# Patient Record
Sex: FEMALE | Race: WHITE | NOT HISPANIC OR LATINO | Employment: UNEMPLOYED | URBAN - METROPOLITAN AREA
[De-identification: names, ages, dates, MRNs, and addresses within clinical notes are randomized per-mention and may not be internally consistent; named-entity substitution may affect disease eponyms.]

---

## 2017-01-04 ENCOUNTER — OFFICE VISIT (OUTPATIENT)
Dept: SURGERY | Facility: CLINIC | Age: 47
End: 2017-01-04
Payer: MEDICAID

## 2017-01-04 VITALS
WEIGHT: 199.31 LBS | RESPIRATION RATE: 18 BRPM | SYSTOLIC BLOOD PRESSURE: 120 MMHG | HEIGHT: 67 IN | TEMPERATURE: 96 F | HEART RATE: 78 BPM | OXYGEN SATURATION: 98 % | BODY MASS INDEX: 31.28 KG/M2 | DIASTOLIC BLOOD PRESSURE: 80 MMHG

## 2017-01-04 DIAGNOSIS — N63.0 BREAST MASS: Primary | ICD-10-CM

## 2017-01-04 DIAGNOSIS — R92.8 ABNORMAL MAMMOGRAM: ICD-10-CM

## 2017-01-04 DIAGNOSIS — R22.31 AXILLARY MASS, RIGHT: ICD-10-CM

## 2017-01-04 PROCEDURE — 99999 PR PBB SHADOW E&M-EST. PATIENT-LVL IV: CPT | Mod: PBBFAC,,, | Performed by: NURSE PRACTITIONER

## 2017-01-04 PROCEDURE — 99214 OFFICE O/P EST MOD 30 MIN: CPT | Mod: PBBFAC,PO | Performed by: NURSE PRACTITIONER

## 2017-01-04 PROCEDURE — 99204 OFFICE O/P NEW MOD 45 MIN: CPT | Mod: S$PBB,,, | Performed by: NURSE PRACTITIONER

## 2017-01-04 NOTE — PROGRESS NOTES
Patient ID: Josey Flores is a 46 y.o. female.    Chief Complaint: Breast Problem    HPI: Patient presents for the evaluation of a palpable right breast mass and abnormal mammogram of the right breast. Mammogram was performed at [facility] in [place] on 12/13/16. Pt brings images and a report with her today for review.  Alessio Deluna accompanies her today.    Review of Systems   Constitutional: Negative.    HENT: Negative.    Eyes: Negative.    Respiratory: Negative.    Cardiovascular: Negative.    Gastrointestinal: Negative.    Endocrine: Negative.    Genitourinary: Negative.    Musculoskeletal:        Right arm mass that started out the size of a marble 7 years ago and is now the size of a golf ball. No pain associated with the mass.    Skin:        History of hidradenitis in axillary regions and under breasts. Pt denies any abscess or inflammation of the areas for several years.    Allergic/Immunologic: Negative.    Neurological: Negative.    Hematological: Negative.    Psychiatric/Behavioral: Negative.    Breast: pt relates notice of a right breast mass during a shower at the end of November- went to see gyn for abnormal bleeding and evaluation of the breast mass beginning of Dec. Pt denies change in the size of the right breast mass over this time. Denies any history of bruising or trauma to the breast. No nipple discharge an no previous breast abnormality or surgeries.     Current Outpatient Prescriptions   Medication Sig Dispense Refill    multivitamin (THERAGRAN) per tablet Take 1 tablet by mouth once daily.      naproxen (NAPROSYN) 500 MG tablet Take 1 tablet (500 mg total) by mouth 2 (two) times daily with meals. 30 tablet 0    albuterol 90 mcg/actuation inhaler Inhale 1-2 puffs into the lungs every 6 (six) hours as needed for Wheezing. 1 Inhaler 0     No current facility-administered medications for this visit.        Review of patient's allergies indicates:  No Known Allergies    History  reviewed. No pertinent past medical history.    Past Surgical History   Procedure Laterality Date    Cholecystectomy      Tonsillectomy      Adenoidectomy      Appendectomy         History reviewed. No pertinent family history.    Social History     Social History    Marital status:      Spouse name: N/A    Number of children: N/A    Years of education: N/A     Occupational History    Not on file.     Social History Main Topics    Smoking status: Current Every Day Smoker     Packs/day: 1.00     Types: Cigarettes    Smokeless tobacco: Never Used      Comment: no smoking after mn prior to surgery    Alcohol use No    Drug use: No    Sexual activity: Yes     Partners: Male     Other Topics Concern    Not on file     Social History Narrative       Vitals:    01/04/17 0808   BP: 120/80   Pulse: 78   Resp: 18   Temp: (!) 95.6 °F (35.3 °C)       Physical Exam   Constitutional: She is oriented to person, place, and time. She appears well-developed and well-nourished.   HENT:   Head: Normocephalic and atraumatic.   Right Ear: External ear normal.   Left Ear: External ear normal.   Eyes: Conjunctivae and EOM are normal. Pupils are equal, round, and reactive to light. Right eye exhibits no discharge. Left eye exhibits no discharge. No scleral icterus.   Neck: Normal range of motion. Neck supple. No thyromegaly present.   Cardiovascular: Normal rate and regular rhythm.    No murmur heard.  Pulmonary/Chest: Effort normal and breath sounds normal. Right breast exhibits mass. Right breast exhibits no inverted nipple, no nipple discharge, no skin change and no tenderness. Left breast exhibits no inverted nipple, no mass, no nipple discharge, no skin change and no tenderness.       Abdominal: Soft. Bowel sounds are normal.   Musculoskeletal: Normal range of motion.        Arms:  Lymphadenopathy:        Head (right side): No submental, no submandibular, no tonsillar, no preauricular, no posterior auricular and no  occipital adenopathy present.        Head (left side): No submental, no submandibular, no tonsillar, no preauricular, no posterior auricular and no occipital adenopathy present.     She has no cervical adenopathy.        Right cervical: No superficial cervical and no posterior cervical adenopathy present.       Left cervical: No superficial cervical and no posterior cervical adenopathy present.     She has axillary adenopathy (No discrete mass noted in right axilla- difficult to palpate due to the extensive scarring and thickening. ).        Right axillary: No pectoral and no lateral adenopathy present.        Left axillary: No pectoral and no lateral adenopathy present.       Right: No supraclavicular adenopathy present.        Left: No supraclavicular adenopathy present.   Neurological: She is alert and oriented to person, place, and time.   Skin: Skin is warm and dry.   Psychiatric: She has a normal mood and affect. Her behavior is normal. Judgment and thought content normal.   Vitals reviewed.    IMAGIN16: [facility]  Diagnostic mammogram:  Left breast did not reveal any abnormality  Right breast palpable mass is associated with a large irregularly marginated and spiculated mass that is 3.5 cm in size and highly suspicious for cancer.   Ultrasound- suspicious findings.   Right axilla revealed several soft tissue opacities largest 2 cm in size.     Menarche at 6 y/o   Misc-3  LMP: 42 y/o  No history of radiation therapy to the neck or chest wall.  No hormone use postmenopausal  First preg at 16 y/o    FH: paternal aunt breast cancer- unsure age of onset.       Assessment & Plan:  1. Palpable right breast mass upper outer quadrant  2. Abnormal mammogram associated with palpable findings right breast- solid mass - also soft tissue densities right axilla  3. Clinical exam reveals palpable right breast mass as described above. Suspicious. Unable to discretely palpate any right axillary mass.  Pt has significant scar tissue from hidradenitis.   4. Recommend ultrasound guided core needle biopsy of the right breast mass and the right axillary imaging findings of a 2 cm mass. Risk of bleeding, bruising, discomfort and the need for further surgical intervention if reveals cancer. Explained the suspicious nature of the clinical and imaging findings. Will schedule pt at [facility] for the 2 biopsies- Explained that if the tests reveal cancer would recommend consult with a surgeon and an oncologist to discuss her options which could be mediport placement and neoadjuvant chemotherapy. Pt and  verbalize understanding. Pt scheduled at [facility] for 1/11/17 and will see Dr. Davies on 1/17/17 for results and Dr. Delong for recommendations. Will call pt with results once received per her request. Mobile is [phone] and home number: [phone]. Pt instructed to take the disc with images to [facility] today so they can review and plan for her visit. Pt given my contact number in case of any questions.

## 2017-01-04 NOTE — LETTER
January 4, 2017      Erika Chan MD  [address] [address]           [facility] General Surgery  [address] [place] [address]  Phone: [phone]  Fax: [phone]          Patient: Josey Flores   MR Number: [id number]   YOB: 1970   Date of Visit: 1/4/2017       Dear Dr. Erika Chan:    Thank you for referring Josey Flores to me for evaluation. Attached you will find relevant portions of my assessment and plan of care.    If you have questions, please do not hesitate to call me. I look forward to following Josey Flores along with you.    Sincerely,    Virginie Garrison NP    Enclosure  CC:  No Recipients    If you would like to receive this communication electronically, please contact [email or web address] or [phone] to request more information on [employer] Link access.    For providers and/or their staff who would like to refer a patient to [facility], please contact us through our one-stop-shop provider referral line, Kirsten Ledesma, at [phone].    If you feel you have received this communication in error or would no longer like to receive these types of communications, please e-mail [email or web address]

## 2017-01-04 NOTE — MR AVS SNAPSHOT
[facility] Surgery  [address] [place] [address]  Phone: [phone]  Fax: [phone]                  Josey Flores   2017 8:00 AM   Office Visit    Description:  Female : 1970   Provider:  Virginie Garrison NP   Department:  [facility] Surgery           Reason for Visit     Breast Problem           Diagnoses this Visit        Comments    Breast mass    -  Primary     Abnormal mammogram         Axillary mass, right                To Do List           Goals (5 Years of Data)     None      [facility]     [facility] Nurse Care Line -  Assistance  Registered nurses in the [facility] On Call Center provide clinical advisement, health education, appointment booking, and other advisory services.  Call for this free service at [phone].             Medications           Message regarding Medications     Verify the changes and/or additions to your medication regime listed below are the same as discussed with your clinician today.  If any of these changes or additions are incorrect, please notify your healthcare provider.             Verify that the below list of medications is an accurate representation of the medications you are currently taking.  If none reported, the list may be blank. If incorrect, please contact your healthcare provider. Carry this list with you in case of emergency.           Current Medications     multivitamin (THERAGRAN) per tablet Take 1 tablet by mouth once daily.    naproxen (NAPROSYN) 500 MG tablet Take 1 tablet (500 mg total) by mouth 2 (two) times daily with meals.    albuterol 90 mcg/actuation inhaler Inhale 1-2 puffs into the lungs every 6 (six) hours as needed for Wheezing.           Clinical Reference Information           Vital Signs - Last Recorded  Most recent update: 2017  8:09 AM by Tan Guerrero III, LPN    BP Pulse Temp Resp Ht Wt    120/80 (BP Location: Right arm, Patient Position: Sitting, BP Method: Manual)  "78 (!) 95.6 °F (35.3 °C) (Oral) 18 5' 7" (1.702 m) 90.4 kg (199 lb 4.7 oz)    SpO2 BMI             98% 31.21 kg/m2         Blood Pressure          Most Recent Value    BP  120/80      Allergies as of 1/4/2017     No Known Allergies      Immunizations Administered on Date of Encounter - 1/4/2017     None      Orders Placed During Today's Visit      Normal Orders This Visit    US Breast Biopsy with Imaging 1st site R     US Breast Biopsy with Imaging 1st site R     Future Labs/Procedures Expected by Expires    US Breast Biopsy with Imaging 1st site R  1/4/2017 3/6/2018    US Breast Biopsy with Imaging 1st site R  1/4/2017 3/6/2018      [facility] Sign-Up     Activating your [facility] account is as easy as 1-2-3!     1) Visit [email or web address], select Sign Up Now, enter this activation code and your date of birth, then select Next.  [email or web address]  Expires: 2/12/2017  1:51 PM      2) Create a username and password to use when you visit [facility] in the future and select a security question in case you lose your password and select Next.    3) Enter your e-mail address and click Sign Up!    Additional Information  If you have questions, please e-mail [email or web address] or call [phone] to talk to our [facility] staff. Remember, [facility] is NOT to be used for urgent needs. For medical emergencies, dial 911.         Instructions    No anti-inflammatory medications 7 days prior to biopsy: Aleve, Aspirin, Ibuprofen and prescription anti-inflammatory medications.    May eat breakfast and take medications on day of procedure.    After procedure use ice pack over site intermittently and a good support bra for 24 hours.    No vigorous activity for 48 hours after biopsy that would cause extreme breast movement.     Return to clinic in one week for pathology results.         Smoking Cessation     If you would like to quit smoking:   You may be eligible for free services if you are a [place] resident and started smoking " cigarettes before September 1, 1988.  Call the Smoking Cessation Trust (SCT) toll free at (925) 431-5344 or (362) 936-8807.   Call 7-283-QUIT-NOW if you do not meet the above criteria.

## 2017-01-04 NOTE — PATIENT INSTRUCTIONS
No anti-inflammatory medications 7 days prior to biopsy: Aleve, Aspirin, Ibuprofen and prescription anti-inflammatory medications.    May eat breakfast and take medications on day of procedure.    After procedure use ice pack over site intermittently and a good support bra for 24 hours.    No vigorous activity for 48 hours after biopsy that would cause extreme breast movement.     Return to clinic in one week for pathology results.

## 2017-01-17 ENCOUNTER — HOSPITAL ENCOUNTER (OUTPATIENT)
Dept: RADIOLOGY | Facility: HOSPITAL | Age: 47
Discharge: HOME | End: 2017-01-17
Attending: INTERNAL MEDICINE
Payer: MEDICAID

## 2017-01-17 ENCOUNTER — OFFICE VISIT (OUTPATIENT)
Dept: SURGERY | Facility: CLINIC | Age: 47
End: 2017-01-17
Payer: MEDICAID

## 2017-01-17 ENCOUNTER — INITIAL CONSULT (OUTPATIENT)
Dept: HEMATOLOGY/ONCOLOGY | Facility: CLINIC | Age: 47
End: 2017-01-17
Payer: MEDICAID

## 2017-01-17 VITALS
HEART RATE: 79 BPM | SYSTOLIC BLOOD PRESSURE: 112 MMHG | OXYGEN SATURATION: 98 % | DIASTOLIC BLOOD PRESSURE: 70 MMHG | TEMPERATURE: 98 F | WEIGHT: 199.5 LBS | BODY MASS INDEX: 31.31 KG/M2 | HEIGHT: 67 IN

## 2017-01-17 VITALS
SYSTOLIC BLOOD PRESSURE: 124 MMHG | DIASTOLIC BLOOD PRESSURE: 73 MMHG | HEART RATE: 83 BPM | BODY MASS INDEX: 31.21 KG/M2 | TEMPERATURE: 98 F | WEIGHT: 199.31 LBS

## 2017-01-17 DIAGNOSIS — R22.31 MASS OF RIGHT ELBOW: ICD-10-CM

## 2017-01-17 DIAGNOSIS — C77.3 MALIGNANT NEOPLASM METASTATIC TO LYMPH NODE OF AXILLA: ICD-10-CM

## 2017-01-17 DIAGNOSIS — C50.811 MALIGNANT NEOPLASM OF OVERLAPPING SITES OF RIGHT FEMALE BREAST: ICD-10-CM

## 2017-01-17 DIAGNOSIS — C50.811 MALIGNANT NEOPLASM OF OVERLAPPING SITES OF RIGHT FEMALE BREAST: Primary | ICD-10-CM

## 2017-01-17 DIAGNOSIS — C77.3 BREAST CANCER METASTASIZED TO AXILLARY LYMPH NODE, RIGHT: Primary | ICD-10-CM

## 2017-01-17 DIAGNOSIS — C50.911 BREAST CANCER METASTASIZED TO AXILLARY LYMPH NODE, RIGHT: Primary | ICD-10-CM

## 2017-01-17 PROCEDURE — 73202 CT UPPR EXTREMITY W/O&W/DYE: CPT | Mod: 26,RT,, | Performed by: RADIOLOGY

## 2017-01-17 PROCEDURE — 74178 CT ABD&PLV WO CNTR FLWD CNTR: CPT | Mod: TC,PO

## 2017-01-17 PROCEDURE — 99205 OFFICE O/P NEW HI 60 MIN: CPT | Mod: S$PBB,,, | Performed by: INTERNAL MEDICINE

## 2017-01-17 PROCEDURE — 71260 CT THORAX DX C+: CPT | Mod: 26,,, | Performed by: RADIOLOGY

## 2017-01-17 PROCEDURE — 74178 CT ABD&PLV WO CNTR FLWD CNTR: CPT | Mod: 26,,, | Performed by: RADIOLOGY

## 2017-01-17 PROCEDURE — 99999 PR PBB SHADOW E&M-EST. PATIENT-LVL III: CPT | Mod: PBBFAC,,, | Performed by: SURGERY

## 2017-01-17 PROCEDURE — 73202 CT UPPR EXTREMITY W/O&W/DYE: CPT | Mod: TC,PO,RT

## 2017-01-17 PROCEDURE — 25500020 PHARM REV CODE 255: Mod: PO | Performed by: INTERNAL MEDICINE

## 2017-01-17 PROCEDURE — 71260 CT THORAX DX C+: CPT | Mod: TC,PO

## 2017-01-17 PROCEDURE — 99214 OFFICE O/P EST MOD 30 MIN: CPT | Mod: S$PBB,,, | Performed by: SURGERY

## 2017-01-17 PROCEDURE — 99999 PR PBB SHADOW E&M-EST. PATIENT-LVL III: CPT | Mod: PBBFAC,,, | Performed by: INTERNAL MEDICINE

## 2017-01-17 RX ORDER — ONDANSETRON 2 MG/ML
4 INJECTION INTRAMUSCULAR; INTRAVENOUS EVERY 12 HOURS PRN
Status: CANCELLED | OUTPATIENT
Start: 2017-01-17

## 2017-01-17 RX ORDER — SODIUM CHLORIDE 9 MG/ML
INJECTION, SOLUTION INTRAVENOUS CONTINUOUS
Status: CANCELLED | OUTPATIENT
Start: 2017-01-17

## 2017-01-17 RX ADMIN — IOHEXOL 30 ML: 350 INJECTION, SOLUTION INTRAVENOUS at 12:01

## 2017-01-17 RX ADMIN — IOHEXOL 100 ML: 350 INJECTION, SOLUTION INTRAVENOUS at 02:01

## 2017-01-17 NOTE — LETTER
January 17, 2017      Virginie Garrison NP  [address]  [place] [address]           [facility] General Surgery  [address]  [place] [address]  Phone: [phone]  Fax: [phone]          Patient: Josey Flores   MR Number: [id number]   YOB: 1970   Date of Visit: 1/17/2017       Dear Virginie Garrison:    Thank you for referring Josey Flores to me for evaluation. Attached you will find relevant portions of my assessment and plan of care.    If you have questions, please do not hesitate to call me. I look forward to following Josey Flores along with you.    Sincerely,    Messi Davies MD    Enclosure  CC:  No Recipients    If you would like to receive this communication electronically, please contact [email or web address] or [phone] to request more information on [employer] Link access.    For providers and/or their staff who would like to refer a patient to [facility], please contact us through our one-stop-shop provider referral line, [facility] , at [phone].    If you feel you have received this communication in error or would no longer like to receive these types of communications, please e-mail [email or web address]

## 2017-01-17 NOTE — LETTER
January 19, 2017      Virginie Garrison NP  [address] [place] [address]           [facility] - Hemotology Oncology  [address]  [place] [address]  Phone: [phone]  Fax: [phone]          Patient: Josey Flores   MR Number: [id number]   YOB: 1970   Date of Visit: 1/17/2017       Dear Virginie Garrison:    Thank you for referring Josey Flores to me for evaluation. Attached you will find relevant portions of my assessment and plan of care.    If you have questions, please do not hesitate to call me. I look forward to following Josey Flores along with you.    Sincerely,    Richard Delong MD    Enclosure  CC:  No Recipients    If you would like to receive this communication electronically, please contact [email or web address] or [phone] to request more information on [employer] Link access.    For providers and/or their staff who would like to refer a patient to [facility], please contact us through our one-stop-shop provider referral line, [facility] , at [phone].    If you feel you have received this communication in error or would no longer like to receive these types of communications, please e-mail [email or web address]

## 2017-01-17 NOTE — H&P
History & Physical    SUBJECTIVE:     History of Present Illness:  Patient is a 46 y.o. female presents with known diagnosis of invasive ductal cancer of the right breast.  She was initially seen several weeks ago following abnormal mammographic findings which led to biopsy of 2 separate lesions on the right breast as well as the right axillary lymphadenopathy.  The biopsies came back consistent with invasive mammary cancer from all 3 biopsies.  The cancer is significant for ER/ND positive and HER-2/kashif negative. She has no family history of breast cancer history.  She is a heavy smoker and has had recent chest pain which led to the emergency room visits.  She denies of any abnormal findings of her right breast but has felt a palpable mass on patient's right elbow area.  The mass is approximately 3 cm in size which is nontender to palpation.  She noticed that the lesion has been growing quite rapidly over last 2-3 months.    Chief Complaint   Patient presents with    Follow-up     review core       Review of patient's allergies indicates:  No Known Allergies    Current Outpatient Prescriptions   Medication Sig Dispense Refill    albuterol 90 mcg/actuation inhaler Inhale 1-2 puffs into the lungs every 6 (six) hours as needed for Wheezing. 1 Inhaler 0    multivitamin (THERAGRAN) per tablet Take 1 tablet by mouth once daily.      naproxen (NAPROSYN) 500 MG tablet Take 1 tablet (500 mg total) by mouth 2 (two) times daily with meals. 30 tablet 0     No current facility-administered medications for this visit.        History reviewed. No pertinent past medical history.  Past Surgical History   Procedure Laterality Date    Cholecystectomy      Tonsillectomy      Adenoidectomy      Appendectomy       History reviewed. No pertinent family history.  Social History   Substance Use Topics    Smoking status: Current Every Day Smoker     Packs/day: 1.00     Types: Cigarettes    Smokeless tobacco: Never Used       Comment: no smoking after mn prior to surgery    Alcohol use No        Review of Systems:  Review of Systems   Constitutional: Negative for activity change, appetite change, chills, diaphoresis, fever and unexpected weight change.   HENT: Negative.    Respiratory: Positive for cough, shortness of breath and wheezing.    Cardiovascular: Negative.    Gastrointestinal: Negative.    Genitourinary: Negative.    Musculoskeletal: Negative.    Neurological: Negative.    Hematological: Negative.    Psychiatric/Behavioral: Negative.        OBJECTIVE:     Vital Signs (Most Recent)  Temp: 97.5 °F (36.4 °C) (01/17/17 0949)  Pulse: 83 (01/17/17 0949)  BP: 124/73 (01/17/17 0949)     90.4 kg (199 lb 4.7 oz)     Physical Exam:  Physical Exam   Constitutional: She appears well-developed and well-nourished. No distress.   HENT:   Head: Normocephalic.   Eyes: Pupils are equal, round, and reactive to light.   Cardiovascular: Normal rate, regular rhythm, normal heart sounds and intact distal pulses.  Exam reveals no gallop and no friction rub.    No murmur heard.  Pulmonary/Chest: Effort normal and breath sounds normal. She has no wheezes. She exhibits no mass and no tenderness. Right breast exhibits mass, skin change and tenderness. Right breast exhibits no inverted nipple and no nipple discharge. Left breast exhibits no inverted nipple, no mass, no nipple discharge, no skin change and no tenderness. There is breast swelling.       Abdominal: Soft. Bowel sounds are normal.   Genitourinary: There is breast tenderness. No breast discharge.   Musculoskeletal: Normal range of motion. She exhibits no edema, tenderness or deformity.   Right elbow mass, medial, measured approximately 3 cm in diameter which is nontender to palpation and mobile.  The finding is consistent with enlarged lymphadenopathy.       Laboratory  Lab Results   Component Value Date    WBC 8.95 12/09/2016    HGB 14.0 12/09/2016    HCT 41.6 12/09/2016     12/09/2016     ALT 14 11/03/2016    AST 17 11/03/2016     12/09/2016    K 4.3 12/09/2016     12/09/2016    CREATININE 1.0 12/09/2016    BUN 9 12/09/2016    CO2 25 12/09/2016    INR 1.0 11/03/2016       No results found for this or any previous visit.      Diagnostic Results:  Labs: Reviewed  ECG: Reviewed  X-Ray: Reviewed  US: Reviewed  Mammographic studies: Reviewed    None    ASSESSMENT/PLAN:     Invasive mammary cancer of the right breast: 2 separate lesions on the right breast as well as right axillary lymphadenopathies.    PLAN:Plan     The recommendation is proceed with modified radical mastectomy of the right breast.  Also the palpable mass on patient's right elbow area should be excised for biopsy purposes as well.  The risk and the benefit of the surgical intervention have been discussed.  The patient is agreeable with the plan.  The possibility of immediate reconstruction was also discussed but not recommended.  She has fairly advanced stage of the breast cancer which might likely need radiation treatment of the right axilla.  She is also suspected to see Dr. Delong.     Messi Davies

## 2017-01-17 NOTE — MR AVS SNAPSHOT
[facility]  [address] [facility] [address]  Phone: [phone]  Fax: [phone]                  Josey Flores   2017 9:20 AM   Office Visit    Description:  Female : 1970   Provider:  Messi Davies MD   Department:  [facility]           Reason for Visit     Follow-up           Diagnoses this Visit        Comments    Breast cancer metastasized to axillary lymph node, right    -  Primary            To Do List           Future Appointments        Provider Department Dept Phone    2017 1:15 PM LAB, SAME DAY [facility] [phone]    2/3/2017 9:00 AM Messi Davies MD [facility] [phone]      Your Future Surgeries/Procedures     2017   Surgery with Messi Davies MD   [facility]  ([facility])    [address] [facility] [address] [phone]              Goals (5 Years of Data)     None      [facility]     [facility] Nurse Care Line -  Assistance  Registered nurses in the [facility] provide clinical advisement, health education, appointment booking, and other advisory services.  Call for this free service at [phone].             Medications           Message regarding Medications     Verify the changes and/or additions to your medication regime listed below are the same as discussed with your clinician today.  If any of these changes or additions are incorrect, please notify your healthcare provider.             Verify that the below list of medications is an accurate representation of the medications you are currently taking.  If none reported, the list may be blank. If incorrect, please contact your healthcare provider. Carry this list with you in case of emergency.           Current Medications     albuterol 90 mcg/actuation inhaler Inhale 1-2 puffs into the lungs every 6 (six) hours as needed for Wheezing.    multivitamin  (THERAGRAN) per tablet Take 1 tablet by mouth once daily.    naproxen (NAPROSYN) 500 MG tablet Take 1 tablet (500 mg total) by mouth 2 (two) times daily with meals.           Clinical Reference Information           Vital Signs - Last Recorded  Most recent update: 1/17/2017  9:52 AM by Pam Arora MA    BP Pulse Temp Wt BMI    124/73 83 97.5 °F (36.4 °C) (Oral) 90.4 kg (199 lb 4.7 oz) 31.21 kg/m2      Blood Pressure          Most Recent Value    BP  124/73      Allergies as of 1/17/2017     No Known Allergies      Immunizations Administered on Date of Encounter - 1/17/2017     None      Orders Placed During Today's Visit      Normal Orders This Visit    Case Request Operating Room: MASTECTOMY, SENTINEL NODE BX, ANCILLARY NODE DISSECTION     Medium Risk of VTE     Future Labs/Procedures Expected by Expires    CBC auto differential  1/17/2017 3/18/2018    Comprehensive metabolic panel  1/17/2017 3/18/2018      [facility] Sign-Up     Activating your [facility] account is as easy as 1-2-3!     1) Visit [email or web address], select Sign Up Now, enter this activation code and your date of birth, then select Next.  [email or web address]  Expires: 2/12/2017  1:51 PM      2) Create a username and password to use when you visit [facility] in the future and select a security question in case you lose your password and select Next.    3) Enter your e-mail address and click Sign Up!    Additional Information  If you have questions, please e-mail [email or web address] or call [phone] to talk to our [facility] staff. Remember, [facility] is NOT to be used for urgent needs. For medical emergencies, dial 911.         Smoking Cessation     If you would like to quit smoking:   You may be eligible for free services if you are a [place] resident and started smoking cigarettes before September 1, 1988.  Call the Smoking Cessation [employer] (SCT) toll free at (578) 351-2127 or (516) 221-7851.   Call 5-800-QUIT-NOW if you do not meet the above  criteria.

## 2017-01-19 ENCOUNTER — OFFICE VISIT (OUTPATIENT)
Dept: HEMATOLOGY/ONCOLOGY | Facility: CLINIC | Age: 47
End: 2017-01-19
Payer: MEDICAID

## 2017-01-19 VITALS
SYSTOLIC BLOOD PRESSURE: 130 MMHG | HEART RATE: 90 BPM | TEMPERATURE: 97 F | BODY MASS INDEX: 31.08 KG/M2 | DIASTOLIC BLOOD PRESSURE: 78 MMHG | HEIGHT: 67 IN | WEIGHT: 198 LBS | OXYGEN SATURATION: 98 %

## 2017-01-19 DIAGNOSIS — C50.811 MALIGNANT NEOPLASM OF OVERLAPPING SITES OF RIGHT FEMALE BREAST: ICD-10-CM

## 2017-01-19 DIAGNOSIS — C77.3 MALIGNANT NEOPLASM METASTATIC TO LYMPH NODE OF AXILLA: ICD-10-CM

## 2017-01-19 DIAGNOSIS — F41.9 ANXIETY: ICD-10-CM

## 2017-01-19 PROCEDURE — 99214 OFFICE O/P EST MOD 30 MIN: CPT | Mod: S$PBB,,, | Performed by: INTERNAL MEDICINE

## 2017-01-19 PROCEDURE — 99213 OFFICE O/P EST LOW 20 MIN: CPT | Mod: PBBFAC,PO | Performed by: INTERNAL MEDICINE

## 2017-01-19 PROCEDURE — 99999 PR PBB SHADOW E&M-EST. PATIENT-LVL III: CPT | Mod: PBBFAC,,, | Performed by: INTERNAL MEDICINE

## 2017-01-19 RX ORDER — ALPRAZOLAM 0.5 MG/1
0.5 TABLET ORAL 2 TIMES DAILY PRN
Qty: 60 TABLET | Refills: 0 | Status: SHIPPED | OUTPATIENT
Start: 2017-01-19 | End: 2017-02-27 | Stop reason: SDUPTHER

## 2017-01-19 NOTE — PROGRESS NOTES
Provider requesting consultation: Dr. Messi Daveis with general surgery  Reason for Consult: Right breast carcinoma  Date of Diagnosis: January 12, 2017    HPI:   The patient is a 46-year-old  female who presents to the hematology oncology clinic today in consultation to discuss further evaluation and management recommendations for newly diagnosed right breast carcinoma.  The patient has been referred to me by Dr. Messi Davies with general surgery.  I have reviewed all of the patient's relevant clinical history available in the medical record and have utilized this in my evaluation and management recommendations today.  The patient had 2 separate areas in the right breast that were biopsied area the first was a right breast mass at 11:00 which was 3 cm from the nipple which showed invasive mammary carcinoma.  The invasive carcinoma measured at least 1.2 cm in greatest I mention and appeared high-grade.  This tumor was ER positive/WI positive/HER-2 negative.  The second breast mass was biopsied from the retroperitoneal area lower area and was also positive for invasive mammary carcinoma.  This measured at least 0.4 cm and also appeared high-grade.  The tumor is morphologically very similar to the tumor in the prior specimen.  Receptor studies were not done on this specimen.  The patient also had a palpable right axillary lymph node which was biopsied and showed metastatic mammary carcinoma which measured at least 1.3 cm in greatest dimension.  The patient had self palpated this breast mass.  In addition the patient has a palpable mass in the region of her right elbow which she reports has been present for 9 years but had been slowly growing especially over the past year or 2.  She denies any pain associated with this.  She denies any fevers, chills or night sweats.  She denies any melena, hematochezia, hematemesis, hemoptysis or hematuria.  She denies any bowel or urinary complaints.  She denies any nausea,  vomiting or abdominal pain.    PAST MEDICAL HISTORY:   Denies    SURGICAL HISTORY:   1.  Tonsillectomy and adenoidectomy  2.  Appendectomy  3.  Cholecystectomy  4.  D&C    FAMILY HISTORY: She reports that her maternal aunt had lung cancer in her 70s.  She used to smoke cigarettes.  Her maternal cousin had lung cancer in her 60s.  She used to smoke cigarettes.  She denies any other immediate family members with cancer or bleeding/clotting disorders.    SOCIAL HISTORY: She reports a 45+ pack year smoking history and currently smokes one and a half packs of cigarettes a day.  She drinks wine occasionally.  She denies any history of recreational drug use.  She is engaged and has 2 sons.  She works for the [employer].  She lives in [place], [place].     ALLERGIES: [NKDA]    MEDICATIONS: [Medcard has been reviewed and/or reconciled.]    REVIEW OF SYSTEMS:   GENERAL: [No fevers, chills or sweats. No fatigue, weight loss or loss of appetite.]  HEENT: [No blurred vision, tinnitus, nasal discharge, sorethroat or dysphagia.]  HEART: [No chest pain, palpitations or shortness of breath.]    LUNGS: [No cough, hemoptysis or breathing problems.]  ABDOMEN: [No abdominal pain, nausea, vomiting, diarrhea, constipation or melena.]  GENITOURINARY: [No dysuria, bleeding or malodorous discharge.]  NEURO: [No headache, dizziness or vertigo.]  HEMATOLOGY: [No easy bruising, spontaneous bleeding or blood clots in the past].  MUSCULOSKELETAL: [No arthralgias, myalgias or bone pains.]  SKIN: [No rashes or skin lesions.]  PSYCHIATRY: [No depression or anxiety.]    PHYSICAL EXAMINATION:   VS: Reviewed on nurse's notes.  APPEARANCE: The patient is a well-developed, well-nourished and well-groomed  female who appears in no acute distress but she was accompanied to this clinic visit by her fiancé and sister.  HEENT: No scleral icterus. Both external auditory canals clear. No oral ulcers, lesions. Throat clear  HEAD: No sinus  tenderness.  NECK: Supple. No palpable lymphadenopathy. Thyroid non-tender, no palpable masses  CHEST: Breath sounds clear bilaterally. No rales. No rhonchi. Unlabored respirations.  BREAST: There is a palpable breast mass at the 11:00 position in the right breast.  Ecchymoses from biopsies are noted.  No palpable masses in the left breast.  No evidence of nipple discharge bilaterally.  CARDIOVASCULAR: Normal S1, S2. Normal rate. Regular rhythm.  ABDOMEN: Bowel sounds normal. No tenderness. No abdominal distention. No hepatomegaly. No splenomegaly.  LYMPHATIC: No palpable supraclavicular.  There is a palpable right axillary lymph node.  Small hematoma from recent biopsy also noted.    EXTREMITIES: No clubbing, cyanosis, edema.  Palpable firm 3 cm mass in the right elbow is noted.  SKIN: No lesions. No petechiae. No ecchymoses. No induration or nodules  NEUROLOGIC: No focal findings. Alert & Oriented x 3. Mood appropriate to affect    LABS:   Reviewed    IMAGING:  Reviewed    IMPRESSION:  1.  Multifocal infiltrating ductal carcinoma of the right breast [ER positive/IA positive/HER-2 negative]  2.  Metastatic carcinoma in right axillary lymph node  3.  Tobacco abuse  4.  Right elbow mass    PLAN:  1.  I had a detailed discussion with the patient and her family members today with regard to the diagnosis, prognosis and treatment options for her newly diagnosed multifocal invasive ductal carcinoma of the right breast.  2.  I have recommended proceeding with CT scans of the thorax/abdomen/pelvis for staging of her newly diagnosed malignancy.  I will also include CT of the right elbow for further evaluation of the right elbow mass.  I have discussed that this is an unlikely area for breast cancer to metastasize to.  Differential diagnosis does include benign etiologies such as cyst.  3.  I will follow-up with results of CBC, CMP scheduled for later today.  4.  The patient was strongly encouraged to quit smoking.  She  expressed understanding.  5.  I will plan on obtaining ER/MS/HER-2 status on the retroareolar breast tumor as well.    Return to clinic after above to discuss further management.  She knows to call sooner for any new problems or questions.    I spent greater than one hour face-to-face with the patient and her family members discussing and reviewing all of the above including reviewing outside medical records and discussing with Dr. Davies in detail with greater than 50% of this time spent in counseling.    Richard Delong MD

## 2017-01-19 NOTE — PROGRESS NOTES
Reason for visit: Right breast carcinoma  Date of Diagnosis: January 12, 2017    HPI:   The patient is a 46-year-old  female who presents to the hematology oncology clinic today in consultation to discuss further evaluation and management recommendations for newly diagnosed right breast carcinoma.    I have reviewed all of the patient's relevant clinical history available in the medical record and have utilized this in my evaluation and management recommendations today.  The patient had 2 separate areas in the right breast that were biopsied area the first was a right breast mass at 11:00 which was 3 cm from the nipple which showed invasive mammary carcinoma.  The invasive carcinoma measured at least 1.2 cm in greatest I mention and appeared high-grade.  This tumor was ER positive/SC positive/HER-2 negative.  The second breast mass was biopsied from the retroperitoneal area lower area and was also positive for invasive mammary carcinoma.  This measured at least 0.4 cm and also appeared high-grade.  The tumor is morphologically very similar to the tumor in the prior specimen.  Receptor studies were not done on this specimen.  The patient also had a palpable right axillary lymph node which was biopsied and showed metastatic mammary carcinoma which measured at least 1.3 cm in greatest dimension.  The patient had self palpated this breast mass.  In addition the patient has a palpable mass in the region of her right elbow which she reports has been present for 9 years but had been slowly growing especially over the past year or 2.  She denies any pain associated with this.  She denies any fevers, chills or night sweats.  She denies any melena, hematochezia, hematemesis, hemoptysis or hematuria.  She denies any bowel or urinary complaints.  She denies any nausea, vomiting or abdominal pain.    PAST MEDICAL HISTORY:   Denies    SURGICAL HISTORY:   1.  Tonsillectomy and adenoidectomy  2.  Appendectomy  3.   Cholecystectomy  4.  D&C    FAMILY HISTORY: She reports that her maternal aunt had lung cancer in her 70s.  She used to smoke cigarettes.  Her maternal cousin had lung cancer in her 60s.  She used to smoke cigarettes.  She denies any other immediate family members with cancer or bleeding/clotting disorders.    SOCIAL HISTORY: She reports a 45+ pack year smoking history and currently smokes one and a half packs of cigarettes a day.  She drinks wine occasionally.  She denies any history of recreational drug use.  She is engaged and has 2 sons.  She works for the [employer].  She lives in [place], [place].     ALLERGIES: [NKDA]    MEDICATIONS: [Medcard has been reviewed and/or reconciled.]    REVIEW OF SYSTEMS:   GENERAL: [No fevers, chills or sweats. No fatigue, weight loss or loss of appetite.]  HEENT: [No blurred vision, tinnitus, nasal discharge, sorethroat or dysphagia.]  HEART: [No chest pain, palpitations or shortness of breath.]    LUNGS: [No cough, hemoptysis or breathing problems.]  ABDOMEN: [No abdominal pain, nausea, vomiting, diarrhea, constipation or melena.]  GENITOURINARY: [No dysuria, bleeding or malodorous discharge.]  NEURO: [No headache, dizziness or vertigo.]  HEMATOLOGY: [No easy bruising, spontaneous bleeding or blood clots in the past].  MUSCULOSKELETAL: [No arthralgias, myalgias or bone pains.]  SKIN: [No rashes or skin lesions.]  PSYCHIATRY: [No depression. Reports anxiety.]    PHYSICAL EXAMINATION:   VS: Reviewed on nurse's notes.  APPEARANCE: The patient is a well-developed, well-nourished and well-groomed  female who appears in no acute distress but she was accompanied to this clinic visit by her fiance, sister and niece.  The remainder of the patient's physical exam was deferred today.    LABS:   Reviewed    IMAGING:  Reviewed    IMPRESSION:  1.  Multifocal infiltrating ductal carcinoma of the right breast [ER positive/NE positive/HER-2 negative]  2.  Metastatic carcinoma  in right axillary lymph node  3.  Tobacco abuse  4.  Right elbow mass  5.  Anxiety    PLAN:  1.  I had a detailed discussion with the patient and her family members today with regard to the diagnosis, prognosis and treatment options for her newly diagnosed multifocal invasive ductal carcinoma of the right breast.  2.  Results of CT scans of the thorax/abdomen/pelvis and right elbow were discussed in detail.  No definitive evidence of distant metastatic disease is noted.  I have discussed the patient's case in detail with Dr. Davies as well.  He is concerned about potential infectious complications with neoadjuvant chemotherapy because of the patient's axillary hydradenitis.  Therefore the plan at this time is to proceed with right mastectomy with right axillary lymph node dissection followed by adjuvant chemotherapy and/or radiation therapy if indicated based on pathology results.  Dr. Davies will also resect the patient's right elbow mass for further evaluation and will treat the axillae hydradenitis as well.  3.  The patient was strongly encouraged to quit smoking.  She expressed understanding.  Prescription for Xanax when necessary anxiety was given to the patient after full discussion of sedation precautions.      Return to clinic  2 weeks after completion of surgical resection to discuss further management. She knows to call sooner for any new problems or questions.    I spent greater than 20 min face-to-face with the patient and her family members discussing and reviewing all of the above in detail with greater than 50% of this time spent in counseling.    Richard Delong MD

## 2017-01-20 ENCOUNTER — TELEPHONE (OUTPATIENT)
Dept: HEMATOLOGY/ONCOLOGY | Facility: CLINIC | Age: 47
End: 2017-01-20

## 2017-01-20 DIAGNOSIS — C50.811 MALIGNANT NEOPLASM OF OVERLAPPING SITES OF RIGHT FEMALE BREAST: Primary | ICD-10-CM

## 2017-01-20 NOTE — TELEPHONE ENCOUNTER
----- Message from Saira Cantor sent at 1/19/2017  1:55 PM CST -----  Contact: Joanna/ [facility]   Joanna is requesting to speak with nurse regarding order placed by dr. Mg call Joanna back at [phone]

## 2017-01-23 ENCOUNTER — HOSPITAL ENCOUNTER (OUTPATIENT)
Dept: RADIOLOGY | Facility: HOSPITAL | Age: 47
Discharge: HOME | End: 2017-01-23
Attending: INTERNAL MEDICINE
Payer: MEDICAID

## 2017-01-23 ENCOUNTER — TELEPHONE (OUTPATIENT)
Dept: HEMATOLOGY/ONCOLOGY | Facility: CLINIC | Age: 47
End: 2017-01-23

## 2017-01-23 DIAGNOSIS — C77.3 MALIGNANT NEOPLASM METASTATIC TO LYMPH NODE OF AXILLA: ICD-10-CM

## 2017-01-23 DIAGNOSIS — C50.811 MALIGNANT NEOPLASM OF OVERLAPPING SITES OF RIGHT FEMALE BREAST: ICD-10-CM

## 2017-01-23 PROCEDURE — A9552 F18 FDG: HCPCS | Mod: PO

## 2017-01-23 PROCEDURE — 78815 PET IMAGE W/CT SKULL-THIGH: CPT | Mod: 26,,, | Performed by: RADIOLOGY

## 2017-01-23 NOTE — TELEPHONE ENCOUNTER
----- Message from Richard Delong MD sent at 1/23/2017 12:15 PM CST -----  PET/CT result is abnormal. I need to see her in clinic tomorrow to discuss. Thank you

## 2017-01-23 NOTE — TELEPHONE ENCOUNTER
----- Message from Saira Cantor sent at 1/23/2017 11:32 AM CST -----  Contact: pt  Pt is requesting to speak with nurse regarding questions on appt from 1/23/17. Pls call pt back at [phone]

## 2017-01-24 ENCOUNTER — OFFICE VISIT (OUTPATIENT)
Dept: HEMATOLOGY/ONCOLOGY | Facility: CLINIC | Age: 47
End: 2017-01-24
Payer: MEDICAID

## 2017-01-24 VITALS
HEIGHT: 67 IN | OXYGEN SATURATION: 98 % | DIASTOLIC BLOOD PRESSURE: 80 MMHG | SYSTOLIC BLOOD PRESSURE: 122 MMHG | TEMPERATURE: 98 F | BODY MASS INDEX: 31.35 KG/M2 | WEIGHT: 199.75 LBS | HEART RATE: 87 BPM

## 2017-01-24 DIAGNOSIS — E04.1 THYROID NODULE: ICD-10-CM

## 2017-01-24 DIAGNOSIS — C50.811 MALIGNANT NEOPLASM OF OVERLAPPING SITES OF RIGHT FEMALE BREAST: Primary | ICD-10-CM

## 2017-01-24 DIAGNOSIS — C77.3 MALIGNANT NEOPLASM METASTATIC TO LYMPH NODE OF AXILLA: ICD-10-CM

## 2017-01-24 DIAGNOSIS — C79.51 SECONDARY CANCER OF BONE: ICD-10-CM

## 2017-01-24 PROCEDURE — 99215 OFFICE O/P EST HI 40 MIN: CPT | Mod: S$PBB,,, | Performed by: INTERNAL MEDICINE

## 2017-01-24 PROCEDURE — 99213 OFFICE O/P EST LOW 20 MIN: CPT | Mod: PBBFAC | Performed by: INTERNAL MEDICINE

## 2017-01-24 PROCEDURE — 99999 PR PBB SHADOW E&M-EST. PATIENT-LVL III: CPT | Mod: PBBFAC,,, | Performed by: INTERNAL MEDICINE

## 2017-01-24 NOTE — PROGRESS NOTES
Reason for visit: Right breast carcinoma  Date of Diagnosis: January 12, 2017    HPI:   The patient is a 46-year-old  female who presents to the hematology oncology clinic today to discuss further evaluation and management recommendations for newly diagnosed right breast carcinoma.    I have reviewed all of the patient's relevant clinical history available in the medical record and have utilized this in my evaluation and management recommendations today.  The patient had 2 separate areas in the right breast that were biopsied area the first was a right breast mass at 11:00 which was 3 cm from the nipple which showed invasive mammary carcinoma.  The invasive carcinoma measured at least 1.2 cm in greatest I mention and appeared high-grade.  This tumor was ER positive/DE positive/HER-2 negative.  The second breast mass was biopsied from the retroperitoneal area lower area and was also positive for invasive mammary carcinoma.  This measured at least 0.4 cm and also appeared high-grade.  The tumor is morphologically very similar to the tumor in the prior specimen.  Receptor studies were not done on this specimen.  The patient also had a palpable right axillary lymph node which was biopsied and showed metastatic mammary carcinoma which measured at least 1.3 cm in greatest dimension.  The patient had self palpated this breast mass.  In addition the patient has a palpable mass in the region of her right elbow which she reports has been present for 9 years but had been slowly growing especially over the past year or 2.  She denies any pain associated with this.  She denies any fevers, chills or night sweats.  She denies any melena, hematochezia, hematemesis, hemoptysis or hematuria.  She denies any bowel or urinary complaints.  She denies any nausea, vomiting or abdominal pain.    PAST MEDICAL HISTORY:   Denies    SURGICAL HISTORY:   1.  Tonsillectomy and adenoidectomy  2.  Appendectomy  3.  Cholecystectomy  4.   D&C    FAMILY HISTORY: She reports that her maternal aunt had lung cancer in her 70s.  She used to smoke cigarettes.  Her maternal cousin had lung cancer in her 60s.  She used to smoke cigarettes.  She denies any other immediate family members with cancer or bleeding/clotting disorders.    SOCIAL HISTORY: She reports a 45+ pack year smoking history and currently smokes one and a half packs of cigarettes a day.  She drinks wine occasionally.  She denies any history of recreational drug use.  She is engaged and has 2 sons.  She works for the [employer].  She lives in [place], [place].     ALLERGIES: [NKDA]    MEDICATIONS: [Medcard has been reviewed and/or reconciled.]    REVIEW OF SYSTEMS:   GENERAL: [No fevers, chills or sweats. No fatigue, weight loss or loss of appetite.]  HEENT: [No blurred vision, tinnitus, nasal discharge, sorethroat or dysphagia.]  HEART: [No chest pain, palpitations or shortness of breath.]    LUNGS: [No cough, hemoptysis or breathing problems.]  ABDOMEN: [No abdominal pain, nausea, vomiting, diarrhea, constipation or melena.]  GENITOURINARY: [No dysuria, bleeding or malodorous discharge.]  NEURO: [No headache, dizziness or vertigo.]  HEMATOLOGY: [No easy bruising, spontaneous bleeding or blood clots in the past].  MUSCULOSKELETAL: [No arthralgias, myalgias or bone pains.]  SKIN: [No rashes or skin lesions.]  PSYCHIATRY: [No depression. Reports anxiety.]  She denies any suicidal or homicidal ideation.    PHYSICAL EXAMINATION:   VS: Reviewed on nurse's notes.  APPEARANCE: The patient is a well-developed, well-nourished and well-groomed  female who appears in no acute distress. She was accompanied to this clinic visit by her fiance and sister  HEENT: No scleral icterus. Both external auditory canals clear. No oral ulcers, lesions. Throat clear  HEAD: No sinus tenderness.  NECK: Supple. No palpable lymphadenopathy. Thyroid non-tender, no palpable masses  CHEST: Breath sounds  clear bilaterally. No rales. No rhonchi. Unlabored respirations.  BREAST: Deferred today.  CARDIOVASCULAR: Normal S1, S2. Normal rate. Regular rhythm.  ABDOMEN: Bowel sounds normal. No tenderness. No abdominal distention. No hepatomegaly. No splenomegaly.  LYMPHATIC: No palpable supraclavicular. There is a palpable right axillary lymph node. Small hematoma from recent biopsy also noted.   EXTREMITIES: No clubbing, cyanosis, edema. Palpable firm 3 cm mass in the right elbow is noted.  Bilateral axillary hydradenitis is noted without active infection.  SKIN: No lesions. No petechiae. No ecchymoses. No induration or nodules  NEUROLOGIC: No focal findings. Alert & Oriented x 3. Mood appropriate to affect      LABS:   Reviewed    IMAGING:  Reviewed    IMPRESSION:  1.  Metastatic multifocal infiltrating ductal carcinoma of the right breast with right axillary and bone involvement [ER positive/CA positive/HER-2 negative]  2.  FDG avid right thyroid nodule  3.  Tobacco abuse  4.  Right elbow mass  5.  Anxiety    PLAN:  1.  I had a detailed discussion with the patient and her family members today with regard to the diagnosis, prognosis and treatment options for her newly diagnosed metastatic multifocal invasive ductal carcinoma of the right breast with right axillary and bone involvement.  2.  I have discussed that at this time her metastatic malignancy is potentially treatable but not curable.  3.  Results of PET/CT scan from January 23, 2017 were discussed in detail with the patient and her family members today. There are 3 osseous metastatic lesions one involving the manubrium and 2 involving the posterior left ilium most consistent with metastatic disease. She also has FDG avid right thyroid nodule which is highly suspicious for possible malignancy as well.    4.  The patient was strongly encouraged to quit smoking.  She expressed understanding.  Prescription for Xanax when necessary anxiety was previously given to the  patient after full discussion of sedation precautions.    5.  I will check additional lab work today including FSH and estradiol levels.  I will also check TSH today.  I will also plan to discuss her case with her gynecologist Dr. Chan with regard to patient's reported history of abnormal Pap smear and with regard to her menopausal status.  6.  The patient's surgery with Dr. Davies scheduled for January 26, 2017 will be canceled pending the results of the above workup.  I have discussed this via telephone with Dr. Davies.  7.  The patient will be scheduled for an MRI of the brain to evaluate for any evidence of metastatic disease to complete staging workup.  8.  I have recommended proceeding with genetic testing for her diagnosis of breast cancer at the age of 46.  Risks/benefits were discussed in detail and the patient was understanding.  She would like to get her lab work and genetic testing done on a different day as she is currently overwhelmed with all of this information.  I will schedule this as per her request.    Return to clinic after above to discuss further management.   She knows to call sooner for any new problems or questions.    I spent greater than 40 min face-to-face with the patient and her family members discussing and reviewing all of the above in detail with greater than 50% of this time spent in counseling.    Richard Delong MD

## 2017-01-25 ENCOUNTER — LAB VISIT (OUTPATIENT)
Dept: LAB | Facility: HOSPITAL | Age: 47
End: 2017-01-25
Attending: INTERNAL MEDICINE
Payer: MEDICAID

## 2017-01-25 ENCOUNTER — TELEPHONE (OUTPATIENT)
Dept: HEMATOLOGY/ONCOLOGY | Facility: CLINIC | Age: 47
End: 2017-01-25

## 2017-01-25 DIAGNOSIS — C50.811 MALIGNANT NEOPLASM OF OVERLAPPING SITES OF RIGHT FEMALE BREAST: ICD-10-CM

## 2017-01-25 DIAGNOSIS — F32.A DEPRESSION, UNSPECIFIED DEPRESSION TYPE: Primary | ICD-10-CM

## 2017-01-25 DIAGNOSIS — C79.51 SECONDARY CANCER OF BONE: ICD-10-CM

## 2017-01-25 DIAGNOSIS — C77.3 MALIGNANT NEOPLASM METASTATIC TO LYMPH NODE OF AXILLA: ICD-10-CM

## 2017-01-25 DIAGNOSIS — E04.1 THYROID NODULE: ICD-10-CM

## 2017-01-25 LAB
ESTRADIOL SERPL-MCNC: 14 PG/ML
FSH SERPL-ACNC: 98.6 MIU/ML
TSH SERPL DL<=0.005 MIU/L-ACNC: 0.6 UIU/ML

## 2017-01-25 PROCEDURE — 82670 ASSAY OF TOTAL ESTRADIOL: CPT

## 2017-01-25 PROCEDURE — 84443 ASSAY THYROID STIM HORMONE: CPT

## 2017-01-25 PROCEDURE — 36415 COLL VENOUS BLD VENIPUNCTURE: CPT

## 2017-01-25 PROCEDURE — 83001 ASSAY OF GONADOTROPIN (FSH): CPT

## 2017-01-25 RX ORDER — CITALOPRAM 20 MG/1
20 TABLET, FILM COATED ORAL DAILY
Qty: 30 TABLET | Refills: 2 | Status: SHIPPED | OUTPATIENT
Start: 2017-01-25 | End: 2017-04-19 | Stop reason: SDUPTHER

## 2017-01-25 NOTE — TELEPHONE ENCOUNTER
----- Message from Richard Delong MD sent at 1/25/2017  2:51 PM CST -----  Contact: Alessio Deluna (So)  I have sent a prescription for Celexa to her pharmacy to try to help with her depression.  It is not safe for her to take that many Xanax.  The medicine needs to be used very carefully.  Please have one of the social workers call her today to check on her.  She also needs consultation with our clinical psychologist and either Huma or Elsa can set this up for her.  Also she needs to seek immediate medical attention if she has any suicidal ideation.  Please save this note.  Thank you.  ----- Message -----     From: Juliet Gale MA     Sent: 1/25/2017   2:19 PM       To: Richard Delong MD    Pt  calling wanting something called in for pt depression states she's not doing well with the news on yesterday, she's taking 3 xanax at a time just to sleep, and not sleeping or eating as well  ----- Message -----     From: Slade Machado     Sent: 1/25/2017  11:50 AM       To: Baljeet Ramos Staff    Alessio Deluna (Nataliya) is requesting a call from nurse to discuss some health concerns.          Please call Alessio Redd) back at [phone]

## 2017-01-30 ENCOUNTER — TELEPHONE (OUTPATIENT)
Dept: RADIOLOGY | Facility: HOSPITAL | Age: 47
End: 2017-01-30

## 2017-01-31 ENCOUNTER — HOSPITAL ENCOUNTER (OUTPATIENT)
Dept: RADIOLOGY | Facility: HOSPITAL | Age: 47
Discharge: HOME | End: 2017-01-31
Attending: INTERNAL MEDICINE
Payer: MEDICAID

## 2017-01-31 DIAGNOSIS — C50.811 MALIGNANT NEOPLASM OF OVERLAPPING SITES OF RIGHT FEMALE BREAST: ICD-10-CM

## 2017-01-31 DIAGNOSIS — C79.51 SECONDARY CANCER OF BONE: ICD-10-CM

## 2017-01-31 DIAGNOSIS — C77.3 MALIGNANT NEOPLASM METASTATIC TO LYMPH NODE OF AXILLA: ICD-10-CM

## 2017-01-31 DIAGNOSIS — E04.1 THYROID NODULE: ICD-10-CM

## 2017-01-31 PROCEDURE — 70553 MRI BRAIN STEM W/O & W/DYE: CPT | Mod: TC,PO

## 2017-01-31 PROCEDURE — 70553 MRI BRAIN STEM W/O & W/DYE: CPT | Mod: 26,,, | Performed by: RADIOLOGY

## 2017-01-31 PROCEDURE — A9585 GADOBUTROL INJECTION: HCPCS | Mod: PO | Performed by: INTERNAL MEDICINE

## 2017-01-31 PROCEDURE — 25500020 PHARM REV CODE 255: Mod: PO | Performed by: INTERNAL MEDICINE

## 2017-01-31 RX ORDER — GADOBUTROL 604.72 MG/ML
10 INJECTION INTRAVENOUS
Status: COMPLETED | OUTPATIENT
Start: 2017-01-31 | End: 2017-01-31

## 2017-01-31 RX ADMIN — GADOBUTROL 10 ML: 604.72 INJECTION INTRAVENOUS at 04:01

## 2017-02-01 ENCOUNTER — TELEPHONE (OUTPATIENT)
Dept: OBSTETRICS AND GYNECOLOGY | Facility: CLINIC | Age: 47
End: 2017-02-01

## 2017-02-01 ENCOUNTER — OFFICE VISIT (OUTPATIENT)
Dept: HEMATOLOGY/ONCOLOGY | Facility: CLINIC | Age: 47
End: 2017-02-01
Payer: MEDICAID

## 2017-02-01 ENCOUNTER — TELEPHONE (OUTPATIENT)
Dept: PHARMACY | Facility: CLINIC | Age: 47
End: 2017-02-01

## 2017-02-01 VITALS
DIASTOLIC BLOOD PRESSURE: 78 MMHG | OXYGEN SATURATION: 99 % | WEIGHT: 199.94 LBS | BODY MASS INDEX: 31.38 KG/M2 | HEART RATE: 76 BPM | SYSTOLIC BLOOD PRESSURE: 102 MMHG | HEIGHT: 67 IN

## 2017-02-01 DIAGNOSIS — C79.51 SECONDARY CANCER OF BONE: ICD-10-CM

## 2017-02-01 DIAGNOSIS — G89.3 NEOPLASM RELATED PAIN: ICD-10-CM

## 2017-02-01 DIAGNOSIS — C50.811 MALIGNANT NEOPLASM OF OVERLAPPING SITES OF RIGHT FEMALE BREAST: Primary | ICD-10-CM

## 2017-02-01 DIAGNOSIS — C77.3 MALIGNANT NEOPLASM METASTATIC TO LYMPH NODE OF AXILLA: ICD-10-CM

## 2017-02-01 PROCEDURE — 99215 OFFICE O/P EST HI 40 MIN: CPT | Mod: S$PBB,,, | Performed by: INTERNAL MEDICINE

## 2017-02-01 PROCEDURE — 99213 OFFICE O/P EST LOW 20 MIN: CPT | Mod: PBBFAC,PO | Performed by: INTERNAL MEDICINE

## 2017-02-01 PROCEDURE — 99999 PR PBB SHADOW E&M-EST. PATIENT-LVL III: CPT | Mod: PBBFAC,,, | Performed by: INTERNAL MEDICINE

## 2017-02-01 RX ORDER — HYDROCODONE BITARTRATE AND ACETAMINOPHEN 5; 325 MG/1; MG/1
1 TABLET ORAL EVERY 6 HOURS PRN
Qty: 60 TABLET | Refills: 0 | Status: SHIPPED | OUTPATIENT
Start: 2017-02-01 | End: 2017-02-17

## 2017-02-01 RX ORDER — LETROZOLE 2.5 MG/1
2.5 TABLET, FILM COATED ORAL DAILY
Qty: 30 TABLET | Refills: 2 | Status: SHIPPED | OUTPATIENT
Start: 2017-02-01 | End: 2017-04-19 | Stop reason: SDUPTHER

## 2017-02-05 NOTE — PROGRESS NOTES
Reason for visit: Right breast carcinoma  Date of Diagnosis: January 12, 2017    HPI:   The patient is a 46-year-old  female who presents to the hematology oncology clinic today to discuss further evaluation and management recommendations for newly diagnosed metastatic right breast carcinoma.    I have reviewed all of the patient's relevant clinical history available in the medical record and have utilized this in my evaluation and management recommendations today.  The patient had 2 separate areas in the right breast that were biopsied area the first was a right breast mass at 11:00 which was 3 cm from the nipple which showed invasive mammary carcinoma.  The invasive carcinoma measured at least 1.2 cm in greatest I mention and appeared high-grade.  This tumor was ER positive/RI positive/HER-2 negative.  The second breast mass was biopsied from the retroperitoneal area lower area and was also positive for invasive mammary carcinoma.  This measured at least 0.4 cm and also appeared high-grade.  The tumor is morphologically very similar to the tumor in the prior specimen.  Receptor studies were not done on this specimen.  The patient also had a palpable right axillary lymph node which was biopsied and showed metastatic mammary carcinoma which measured at least 1.3 cm in greatest dimension.  The patient had self palpated this breast mass.  In addition the patient has a palpable mass in the region of her right elbow which she reports has been present for 9 years but had been slowly growing especially over the past year or 2.  She denies any pain associated with this.  She denies any fevers, chills or night sweats.  She denies any melena, hematochezia, hematemesis, hemoptysis or hematuria.  She denies any bowel or urinary complaints.  She denies any nausea, vomiting or abdominal pain.    PAST MEDICAL HISTORY:   Denies    SURGICAL HISTORY:   1.  Tonsillectomy and adenoidectomy  2.  Appendectomy  3.   Cholecystectomy  4.  D&C    FAMILY HISTORY: She reports that her maternal aunt had lung cancer in her 70s.  She used to smoke cigarettes.  Her maternal cousin had lung cancer in her 60s.  She used to smoke cigarettes.  She denies any other immediate family members with cancer or bleeding/clotting disorders.    SOCIAL HISTORY: She reports a 45+ pack year smoking history and currently smokes one and a half packs of cigarettes a day.  She drinks wine occasionally.  She denies any history of recreational drug use.  She is engaged and has 2 sons.  She works for the [employer].  She lives in [place], [place].     ALLERGIES: [NKDA]    MEDICATIONS: [Medcard has been reviewed and/or reconciled.]    REVIEW OF SYSTEMS:   GENERAL: [No fevers, chills or sweats. No fatigue, weight loss or loss of appetite.]  HEENT: [No blurred vision, tinnitus, nasal discharge, sorethroat or dysphagia.]  HEART: [No chest pain, palpitations or shortness of breath.]    LUNGS: [No cough, hemoptysis or breathing problems.]  ABDOMEN: [No abdominal pain, nausea, vomiting, diarrhea, constipation or melena.]  GENITOURINARY: [No dysuria, bleeding or malodorous discharge.]  NEURO: [No headache, dizziness or vertigo.]  HEMATOLOGY: [No easy bruising, spontaneous bleeding or blood clots in the past].  MUSCULOSKELETAL: [No arthralgias, myalgias or bone pains.]  SKIN: [No rashes or skin lesions.]  PSYCHIATRY: [No depression. Reports anxiety.]  She denies any suicidal or homicidal ideation.    PHYSICAL EXAMINATION:   VS: Reviewed on nurse's notes.  APPEARANCE: The patient is a well-developed, well-nourished and well-groomed  female who appears in no acute distress. She was accompanied to this clinic visit by her fiance and sister  HEENT: No scleral icterus. Both external auditory canals clear. No oral ulcers, lesions. Throat clear  HEAD: No sinus tenderness.  NECK: Supple. No palpable lymphadenopathy. Thyroid non-tender, no palpable  masses  CHEST: Breath sounds clear bilaterally. No rales. No rhonchi. Unlabored respirations.  BREAST: Deferred today.  CARDIOVASCULAR: Normal S1, S2. Normal rate. Regular rhythm.  ABDOMEN: Bowel sounds normal. No tenderness. No abdominal distention. No hepatomegaly. No splenomegaly.  LYMPHATIC: No palpable supraclavicular. There is a palpable right axillary lymph node. Small hematoma from recent biopsy also noted.   EXTREMITIES: No clubbing, cyanosis, edema. Palpable firm 3 cm mass in the right elbow is noted.  Bilateral axillary hydradenitis is noted without active infection.  SKIN: No lesions. No petechiae. No ecchymoses. No induration or nodules  NEUROLOGIC: No focal findings. Alert & Oriented x 3. Mood appropriate to affect      LABS:   Reviewed    IMAGING:  Reviewed    IMPRESSION:  1.  Metastatic multifocal infiltrating ductal carcinoma of the right breast with right axillary and bone involvement [ER positive/CT positive/HER-2 negative]  2.  FDG avid right thyroid nodule  3.  Tobacco abuse  4.  Right elbow mass  5.  Anxiety    PLAN:  1.  I had a detailed discussion with the patient and her family members today with regard to the diagnosis, prognosis and treatment options for her newly diagnosed metastatic multifocal invasive ductal carcinoma of the right breast with right axillary and bone involvement.  2.  I have discussed that at this time her metastatic malignancy is potentially treatable but not curable.  3.  Results of PET/CT scan from January 23, 2017 were discussed in detail with the patient and her family members today. There are 3 osseous metastatic lesions one involving the manubrium and 2 involving the posterior left ilium most consistent with metastatic disease. She also has FDG avid right thyroid nodule which is highly suspicious for possible malignancy as well.    4.  The patient was strongly encouraged to quit smoking.  She expressed understanding.  Prescription for Xanax when necessary anxiety  was previously given to the patient after full discussion of sedation precautions.    5. FSH and estradiol levels confirm menopausal status. TSH lab was normal. I will also plan to discuss her case with her gynecologist Dr. Chan with regard to patient's reported history of abnormal Pap smear.  6.  MRI of the brain on 1/31/17 to evaluate for any evidence of metastatic disease to complete staging workup was negative for metastatic malignancy to the brain.  7.  At this time after a detailed discussion of all available treatment options we have decided to proceed with ibrance/letrozole.  She will also be scheduled for medication teaching with Ms. Garrison.  Risks/benefits and common side effects of ibrance and letrozole were discussed in detail.  Prescriptions were sent to the pharmacy.  She will start on letrozole at this time.  She will let me know when she gets ibrance delivered to her house.  I will also plan to discuss her case at our multidisciplinary tumor conference.  I will update patient with any new recommendations.  8.  I have recommended proceeding with genetic testing for her diagnosis of breast cancer at the age of 46.  Risks/benefits were discussed in detail and the patient was understanding.  She would like to get her genetic testing done on a different day as she is currently overwhelmed with all of this information.  I will schedule this as per her request.    Return to clinic after above to discuss further management.   She knows to call sooner for any new problems or questions.    I spent greater than 40 min face-to-face with the patient and her family members discussing and reviewing all of the above in detail with greater than 50% of this time spent in counseling.    Richard Delong MD

## 2017-02-08 ENCOUNTER — TELEPHONE (OUTPATIENT)
Dept: PHARMACY | Facility: CLINIC | Age: 47
End: 2017-02-08

## 2017-02-08 ENCOUNTER — TELEPHONE (OUTPATIENT)
Dept: HEMATOLOGY/ONCOLOGY | Facility: CLINIC | Age: 47
End: 2017-02-08

## 2017-02-09 DIAGNOSIS — C50.811 MALIGNANT NEOPLASM OF OVERLAPPING SITES OF RIGHT FEMALE BREAST: ICD-10-CM

## 2017-02-09 DIAGNOSIS — G89.3 NEOPLASM RELATED PAIN: ICD-10-CM

## 2017-02-09 DIAGNOSIS — C77.3 MALIGNANT NEOPLASM METASTATIC TO LYMPH NODE OF AXILLA: ICD-10-CM

## 2017-02-09 DIAGNOSIS — C79.51 SECONDARY CANCER OF BONE: ICD-10-CM

## 2017-02-13 ENCOUNTER — TELEPHONE (OUTPATIENT)
Dept: HEMATOLOGY/ONCOLOGY | Facility: CLINIC | Age: 47
End: 2017-02-13

## 2017-02-13 NOTE — TELEPHONE ENCOUNTER
Rec'd call back from pt. She says her chemo medication is making her groggy. Mentions that the pain medication Dr. Delong prescribed is not helping with pain. SW will let Dr. Delong know. Additionally, pt rescheduled appt due to oversleeping and being unable to get to her appt on time today. Discussed that SW had to move pt's appt time to 10:00am on 2/20/17 (was previously scheduled at 10:45am for a 30 minute slot) so that pt could get full hour of teaching. Pt verbalized understanding of appt time. Finally, normalized concerns and fears over new cancer diagnosis. Pt has a hard time verbalizing specific needs today. Will plan to f/u with pt in person to complete a full assessment. Let pt know that SW is following oral chemo prescription through [facility] and that she may get a call from them. Again, pt verbalized understanding. Will continue to follow to provide support and assistance as indicated by pt.

## 2017-02-13 NOTE — TELEPHONE ENCOUNTER
Attempted to reach pt to discuss medication, address supportive care needs. No answer. Left voicemail requesting call back. It is noted that pt's chemo teaching was rescheduled from today to 2/20/17 and for an incorrect 30 minute time slot. Will reschedule with pt.

## 2017-02-13 NOTE — TELEPHONE ENCOUNTER
----- Message from Yuridia Khan sent at 2/10/2017  4:30 PM CST -----  Contact: pt  Pt requests nurse to call her at [phone] (home) regarding chemo medication.

## 2017-02-17 ENCOUNTER — TELEPHONE (OUTPATIENT)
Dept: HEMATOLOGY/ONCOLOGY | Facility: CLINIC | Age: 47
End: 2017-02-17

## 2017-02-17 ENCOUNTER — DOCUMENTATION ONLY (OUTPATIENT)
Dept: INFUSION THERAPY | Facility: HOSPITAL | Age: 47
End: 2017-02-17

## 2017-02-17 DIAGNOSIS — C50.811 MALIGNANT NEOPLASM OF OVERLAPPING SITES OF RIGHT FEMALE BREAST: Primary | ICD-10-CM

## 2017-02-17 DIAGNOSIS — C79.51 SECONDARY CANCER OF BONE: ICD-10-CM

## 2017-02-17 DIAGNOSIS — C77.3 MALIGNANT NEOPLASM METASTATIC TO LYMPH NODE OF AXILLA: ICD-10-CM

## 2017-02-17 RX ORDER — HYDROCODONE BITARTRATE AND ACETAMINOPHEN 7.5; 325 MG/1; MG/1
1 TABLET ORAL EVERY 6 HOURS PRN
Qty: 60 TABLET | Refills: 0 | Status: SHIPPED | OUTPATIENT
Start: 2017-02-17 | End: 2017-03-10 | Stop reason: SDUPTHER

## 2017-02-17 NOTE — TELEPHONE ENCOUNTER
Called [facility] to check status of patient's Ibrance prescription. [facility] representative confirmed that patient's prescription was shipped on 2/16/17 and delivered today, 2/17/17.

## 2017-02-17 NOTE — PROGRESS NOTES
Patient was presented by Dr. Delong and discussed at the multi-disciplinary [facility] Tumor Conference at [facility] on 2/17/17. The recommendation was to treat with letrozole and Ibrance for 6 months the possible surgery. Also, delay denosumab due to poor dentition and refer to dental services.

## 2017-02-20 ENCOUNTER — OFFICE VISIT (OUTPATIENT)
Dept: SURGERY | Facility: CLINIC | Age: 47
End: 2017-02-20
Payer: MEDICAID

## 2017-02-20 ENCOUNTER — TELEPHONE (OUTPATIENT)
Dept: HEMATOLOGY/ONCOLOGY | Facility: CLINIC | Age: 47
End: 2017-02-20

## 2017-02-20 DIAGNOSIS — C50.811 MALIGNANT NEOPLASM OF OVERLAPPING SITES OF RIGHT FEMALE BREAST: Primary | ICD-10-CM

## 2017-02-20 DIAGNOSIS — Z79.899 HIGH RISK MEDICATION USE: ICD-10-CM

## 2017-02-20 DIAGNOSIS — Z71.89 ENCOUNTER FOR MEDICATION COUNSELING: ICD-10-CM

## 2017-02-20 DIAGNOSIS — Z71.9 HEALTH EDUCATION/COUNSELING: ICD-10-CM

## 2017-02-20 DIAGNOSIS — C77.3 MALIGNANT NEOPLASM METASTATIC TO LYMPH NODE OF AXILLA: ICD-10-CM

## 2017-02-20 DIAGNOSIS — C79.51 SECONDARY CANCER OF BONE: ICD-10-CM

## 2017-02-20 DIAGNOSIS — C50.919 METASTATIC BREAST CANCER: Primary | ICD-10-CM

## 2017-02-20 PROCEDURE — 99212 OFFICE O/P EST SF 10 MIN: CPT | Mod: PBBFAC | Performed by: NURSE PRACTITIONER

## 2017-02-20 PROCEDURE — 99999 PR PBB SHADOW E&M-EST. PATIENT-LVL II: CPT | Mod: PBBFAC,,, | Performed by: NURSE PRACTITIONER

## 2017-02-20 PROCEDURE — 99215 OFFICE O/P EST HI 40 MIN: CPT | Mod: S$PBB,,, | Performed by: NURSE PRACTITIONER

## 2017-02-20 NOTE — LETTER
February 20, 2017      Richard Delong MD  [address]  [place] [address]           [facility]  [address] [place] [id number]  Phone: [phone]  Fax: [phone]          Patient: Josey Flores   MR Number: [id number]   YOB: 1970   Date of Visit: 2/20/2017       Dear Dr. Richard Deolng:    Thank you for referring Josey Flores to me for evaluation. Attached you will find relevant portions of my assessment and plan of care.    If you have questions, please do not hesitate to call me. I look forward to following Josey Flores along with you.    Sincerely,    Virginie Garrison NP    Enclosure  CC:  No Recipients    If you would like to receive this communication electronically, please contact [email or web address] or [phone] to request more information on [employer] access.    For providers and/or their staff who would like to refer a patient to [facility], please contact us through our one-stop-shop provider referral line, [facility], at [phone].    If you feel you have received this communication in error or would no longer like to receive these types of communications, please e-mail [email or web address]

## 2017-02-20 NOTE — PROGRESS NOTES
HEMATOLOGY/ONCOLOGY    ONCOLOGIST: THOMAS Delong MD    CC: Chemotherapy Teaching    DIAGNOSIS: Metastatic Breast cancer    CHEMOTHERAPY:  Ibrance 125 mg by mouth on days 1-21 and Femara 2.5 mg by mouth daily.    47 y/o female accompanied by her significant other for chemotherapy teaching. Pt given the Navigation Notebook. Explained how to use notebook. Discussed with pt and family rationale for chemotherapy, how works, the process of treatment, potential side effects and symptoms to report.     Explained how to use the pill foreman and the schedule for taking her medications. Patient plans to start medication tomorrow- 2/21/17- 14 day f/u recommended for cbc- 3/6/17.     Reviewed specific medications and gave them a handout describing the side effects of each medication.     Discussed potential side effects such as:  Nausea and vomiting  Myelosuppression  Fatigue  Anorexia  Alopecia  Stomatitis  Diarrhea  Cystitis  Gastritis  Fever > 100.0  Antiemetics instructions  Skin care  Constipation  Rash  Hyperpigmentation  Rash  Photosensitivity  Sunscreen  Small freq meals  Increased protein  Increased calories  Vitamin support   Taste alterations  Neuropathys  Hydration  Renal toxicity  Port management  Community resources  Thrombocytopenia precautions  Hand and foot syndrome- symptoms and self care tips  Importance of monitoring blood sugars if diabetic and reporting elevations or lows    Pt verbalized understanding of the information given.    Verbalizes understanding of contact information during and after clinic hours. Pt listened and asked appropriate questions.     Community and social resources brought to her attention. Offered pt PT referral for strength training, deconditioning exercises. Pt declines. Time spent with pt and family was 60 minutes face to face with 100% of time spent educating and counseling. Please see further documentation in the pt education flow sheet.     Virginie Garrison, RN, MSN,  NP-C

## 2017-02-20 NOTE — TELEPHONE ENCOUNTER
----- Message from Richard Delong MD sent at 2/20/2017 11:09 AM CST -----  Please schedule the patient to come and see me in 1 week in the clinic with CBC and CMP before clinic visit. She can start the ibrance as advised by Virginie today. Thank you.

## 2017-02-22 ENCOUNTER — TELEPHONE (OUTPATIENT)
Dept: OBSTETRICS AND GYNECOLOGY | Facility: CLINIC | Age: 47
End: 2017-02-22

## 2017-02-27 ENCOUNTER — OFFICE VISIT (OUTPATIENT)
Dept: HEMATOLOGY/ONCOLOGY | Facility: CLINIC | Age: 47
End: 2017-02-27
Payer: MEDICAID

## 2017-02-27 ENCOUNTER — INFUSION (OUTPATIENT)
Dept: INFUSION THERAPY | Facility: HOSPITAL | Age: 47
End: 2017-02-27
Attending: INTERNAL MEDICINE
Payer: MEDICAID

## 2017-02-27 VITALS
HEIGHT: 67 IN | TEMPERATURE: 97 F | OXYGEN SATURATION: 96 % | HEART RATE: 79 BPM | DIASTOLIC BLOOD PRESSURE: 60 MMHG | WEIGHT: 209 LBS | SYSTOLIC BLOOD PRESSURE: 100 MMHG | BODY MASS INDEX: 32.8 KG/M2

## 2017-02-27 DIAGNOSIS — F41.9 ANXIETY: ICD-10-CM

## 2017-02-27 DIAGNOSIS — C77.3 MALIGNANT NEOPLASM METASTATIC TO LYMPH NODE OF AXILLA: Primary | ICD-10-CM

## 2017-02-27 DIAGNOSIS — C50.811 MALIGNANT NEOPLASM OF OVERLAPPING SITES OF RIGHT FEMALE BREAST: ICD-10-CM

## 2017-02-27 DIAGNOSIS — C79.51 SECONDARY CANCER OF BONE: ICD-10-CM

## 2017-02-27 PROCEDURE — 99999 PR PBB SHADOW E&M-EST. PATIENT-LVL III: CPT | Mod: PBBFAC,,, | Performed by: INTERNAL MEDICINE

## 2017-02-27 PROCEDURE — 36415 COLL VENOUS BLD VENIPUNCTURE: CPT

## 2017-02-27 PROCEDURE — 99215 OFFICE O/P EST HI 40 MIN: CPT | Mod: S$PBB,,, | Performed by: INTERNAL MEDICINE

## 2017-02-27 RX ORDER — ALPRAZOLAM 0.5 MG/1
0.5 TABLET ORAL 2 TIMES DAILY PRN
Qty: 60 TABLET | Refills: 0 | Status: SHIPPED | OUTPATIENT
Start: 2017-02-27 | End: 2017-03-29 | Stop reason: SDUPTHER

## 2017-02-27 NOTE — PROGRESS NOTES
Reason for visit: Right breast carcinoma  Date of Diagnosis: January 12, 2017    HPI:   The patient is a 46-year-old  female who presents to the hematology oncology clinic today to discuss further evaluation and management recommendations for newly diagnosed metastatic right breast carcinoma.    I have reviewed all of the patient's relevant clinical history available in the medical record and have utilized this in my evaluation and management recommendations today.  The patient had 2 separate areas in the right breast that were biopsied area the first was a right breast mass at 11:00 which was 3 cm from the nipple which showed invasive mammary carcinoma.  The invasive carcinoma measured at least 1.2 cm in greatest I mention and appeared high-grade.  This tumor was ER positive/FL positive/HER-2 negative.  The second breast mass was biopsied from the retroperitoneal area lower area and was also positive for invasive mammary carcinoma.  This measured at least 0.4 cm and also appeared high-grade.  The tumor is morphologically very similar to the tumor in the prior specimen.  Receptor studies were not done on this specimen.  The patient also had a palpable right axillary lymph node which was biopsied and showed metastatic mammary carcinoma which measured at least 1.3 cm in greatest dimension.  The patient had self palpated this breast mass.  In addition the patient has a palpable mass in the region of her right elbow which she reports has been present for 9 years but had been slowly growing especially over the past year or 2.  She denies any pain associated with this.  She denies any fevers, chills or night sweats.  She denies any melena, hematochezia, hematemesis, hemoptysis or hematuria.  She denies any bowel or urinary complaints.  She denies any nausea, vomiting or abdominal pain.  She is on cycle 1 of ibrance and letrozole. Ibrance was started on 2/21/17.    PAST MEDICAL HISTORY:   Denies    SURGICAL  HISTORY:   1.  Tonsillectomy and adenoidectomy  2.  Appendectomy  3.  Cholecystectomy  4.  D&C    FAMILY HISTORY: She reports that her maternal aunt had lung cancer in her 70s.  She used to smoke cigarettes.  Her maternal cousin had lung cancer in her 60s.  She used to smoke cigarettes.  She denies any other immediate family members with cancer or bleeding/clotting disorders.    SOCIAL HISTORY: She reports a 45+ pack year smoking history and currently smokes one and a half packs of cigarettes a day.  She drinks wine occasionally.  She denies any history of recreational drug use.  She is engaged and has 2 sons.  She works for the [employer].  She lives in [place], [place].     ALLERGIES: [NKDA]    MEDICATIONS: [Medcard has been reviewed and/or reconciled.]    REVIEW OF SYSTEMS:   GENERAL: [No fevers, chills or sweats. No fatigue, weight loss or loss of appetite.]  HEENT: [No blurred vision, tinnitus, nasal discharge, sorethroat or dysphagia.]  HEART: [No chest pain, palpitations or shortness of breath.]    LUNGS: [No cough, hemoptysis or breathing problems.]  ABDOMEN: [No abdominal pain, nausea, vomiting, diarrhea, constipation or melena.]  GENITOURINARY: [No dysuria, bleeding or malodorous discharge.]  NEURO: [No headache, dizziness or vertigo.]  HEMATOLOGY: [No easy bruising, spontaneous bleeding or blood clots in the past].  MUSCULOSKELETAL: [No arthralgias, myalgias or bone pains.]  SKIN: [No rashes or skin lesions.]  PSYCHIATRY: [No depression. Reports anxiety.]  She denies any suicidal or homicidal ideation.    PHYSICAL EXAMINATION:   VS: Reviewed on nurse's notes.  APPEARANCE: The patient is a well-developed, well-nourished and well-groomed  female who appears in no acute distress. She was accompanied to this clinic visit by her fiance and sister  HEENT: No scleral icterus. Both external auditory canals clear. No oral ulcers, lesions. Throat clear  HEAD: No sinus tenderness.  NECK: Supple. No  palpable lymphadenopathy. Thyroid non-tender, no palpable masses  CHEST: Breath sounds clear bilaterally. No rales. No rhonchi. Unlabored respirations.  BREAST: Deferred today.  CARDIOVASCULAR: Normal S1, S2. Normal rate. Regular rhythm.  ABDOMEN: Bowel sounds normal. No tenderness. No abdominal distention. No hepatomegaly. No splenomegaly.  LYMPHATIC: No palpable supraclavicular. There is a palpable right axillary lymph node. Small hematoma from recent biopsy also noted.   EXTREMITIES: No clubbing, cyanosis, edema. Palpable firm 3 cm mass in the right elbow is noted.  Bilateral axillary hydradenitis is noted without active infection.  SKIN: No lesions. No petechiae. No ecchymoses. No induration or nodules  NEUROLOGIC: No focal findings. Alert & Oriented x 3. Mood appropriate to affect      LABS:   Reviewed    IMAGING:  Reviewed    IMPRESSION:  1.  Metastatic multifocal infiltrating ductal carcinoma of the right breast with right axillary and bone involvement [ER positive/DE positive/HER-2 negative]  2.  FDG avid right thyroid nodule  3.  Tobacco abuse  4.  Right elbow mass  5.  Anxiety and depression    PLAN:  1.  I had a detailed discussion with the patient and her family members today with regard to the diagnosis, prognosis and treatment options for her newly diagnosed metastatic multifocal invasive ductal carcinoma of the right breast with right axillary and bone involvement.  2.  I have discussed that at this time her metastatic malignancy is potentially treatable but not curable.  3.  Results of PET/CT scan from January 23, 2017 were discussed in detail with the patient and her family members today. There are 3 osseous metastatic lesions one involving the manubrium and 2 involving the posterior left ilium most consistent with metastatic disease. She also has FDG avid right thyroid nodule which is highly suspicious for possible malignancy as well.    4.  The patient was strongly encouraged to quit smoking.  She  expressed understanding.  Prescription for Xanax when necessary anxiety was previously given to the patient after full discussion of sedation precautions.    5. FSH and estradiol levels confirm menopausal status. TSH lab was normal. I will also plan to discuss her case with her gynecologist Dr. Chan with regard to patient's reported history of abnormal Pap smear.  6.  MRI of the brain on 1/31/17 to evaluate for any evidence of metastatic disease to complete staging workup was negative for metastatic malignancy to the brain.  7. Continue to proceed with cycle 1 of ibrance/letrozole.  She appears to be tolerating this well so far with no significant side effects. ill also be scheduled for medication teaching with MsWillie Bladimir.  Risks/benefits and common side effects of ibrance and letrozole were discussed in detail.  Prescriptions were sent to the pharmacy.  She will start on letrozole at this time.  She will let me know when she gets ibrance delivered to her house.  I will also plan to discuss her case at our multidisciplinary tumor conference.  I will update patient with any new recommendations.  8.  I have recommended proceeding with genetic testing for her diagnosis of breast cancer at the age of 46.  Risks/benefits were discussed in detail and the patient was understanding.  She would like to get her genetic testing labwork drawn today.    Return to clinic in 1 week with labs to discuss further management.   She knows to call sooner for any new problems or questions.    I spent greater than 40 min face-to-face with the patient and her family members discussing and reviewing all of the above in detail with greater than 50% of this time spent in counseling.    Richard Delong MD

## 2017-02-28 DIAGNOSIS — C50.811 MALIGNANT NEOPLASM OF OVERLAPPING SITES OF RIGHT FEMALE BREAST: Primary | ICD-10-CM

## 2017-03-06 ENCOUNTER — OFFICE VISIT (OUTPATIENT)
Dept: HEMATOLOGY/ONCOLOGY | Facility: CLINIC | Age: 47
End: 2017-03-06
Payer: MEDICAID

## 2017-03-06 ENCOUNTER — LAB VISIT (OUTPATIENT)
Dept: LAB | Facility: HOSPITAL | Age: 47
End: 2017-03-06
Attending: INTERNAL MEDICINE
Payer: MEDICAID

## 2017-03-06 VITALS
RESPIRATION RATE: 18 BRPM | BODY MASS INDEX: 32.91 KG/M2 | WEIGHT: 209.69 LBS | DIASTOLIC BLOOD PRESSURE: 80 MMHG | OXYGEN SATURATION: 97 % | HEART RATE: 80 BPM | SYSTOLIC BLOOD PRESSURE: 120 MMHG | HEIGHT: 67 IN | TEMPERATURE: 98 F

## 2017-03-06 DIAGNOSIS — C50.811 MALIGNANT NEOPLASM OF OVERLAPPING SITES OF RIGHT FEMALE BREAST: Primary | ICD-10-CM

## 2017-03-06 DIAGNOSIS — C77.3 MALIGNANT NEOPLASM METASTATIC TO LYMPH NODE OF AXILLA: ICD-10-CM

## 2017-03-06 DIAGNOSIS — C50.811 MALIGNANT NEOPLASM OF OVERLAPPING SITES OF RIGHT FEMALE BREAST: ICD-10-CM

## 2017-03-06 DIAGNOSIS — R87.619 ABNORMAL CERVICAL PAPANICOLAOU SMEAR, UNSPECIFIED ABNORMAL PAP FINDING: ICD-10-CM

## 2017-03-06 DIAGNOSIS — C79.51 SECONDARY CANCER OF BONE: ICD-10-CM

## 2017-03-06 DIAGNOSIS — F41.9 ANXIETY: ICD-10-CM

## 2017-03-06 LAB
BASOPHILS # BLD AUTO: 0.02 K/UL
BASOPHILS NFR BLD: 0.4 %
DIFFERENTIAL METHOD: ABNORMAL
EOSINOPHIL # BLD AUTO: 0.1 K/UL
EOSINOPHIL NFR BLD: 1.6 %
ERYTHROCYTE [DISTWIDTH] IN BLOOD BY AUTOMATED COUNT: 13.2 %
HCT VFR BLD AUTO: 36.4 %
HGB BLD-MCNC: 12.5 G/DL
LYMPHOCYTES # BLD AUTO: 2.2 K/UL
LYMPHOCYTES NFR BLD: 44.3 %
MCH RBC QN AUTO: 32.6 PG
MCHC RBC AUTO-ENTMCNC: 34.3 %
MCV RBC AUTO: 95 FL
MONOCYTES # BLD AUTO: 0.2 K/UL
MONOCYTES NFR BLD: 4 %
NEUTROPHILS # BLD AUTO: 2.5 K/UL
NEUTROPHILS NFR BLD: 49.7 %
PLATELET # BLD AUTO: 258 K/UL
PMV BLD AUTO: 10.6 FL
RBC # BLD AUTO: 3.83 M/UL
WBC # BLD AUTO: 5.03 K/UL

## 2017-03-06 PROCEDURE — 99213 OFFICE O/P EST LOW 20 MIN: CPT | Mod: PBBFAC,PO | Performed by: INTERNAL MEDICINE

## 2017-03-06 PROCEDURE — 99214 OFFICE O/P EST MOD 30 MIN: CPT | Mod: S$PBB,,, | Performed by: INTERNAL MEDICINE

## 2017-03-06 PROCEDURE — 99999 PR PBB SHADOW E&M-EST. PATIENT-LVL III: CPT | Mod: PBBFAC,,, | Performed by: INTERNAL MEDICINE

## 2017-03-06 NOTE — PROGRESS NOTES
Reason for visit: Right breast carcinoma  Date of Diagnosis: January 12, 2017    HPI:   The patient is a 46-year-old  female who presents to the hematology oncology clinic today to discuss further evaluation and management recommendations for newly diagnosed metastatic right breast carcinoma.    I have reviewed all of the patient's relevant clinical history available in the medical record and have utilized this in my evaluation and management recommendations today.  The patient had 2 separate areas in the right breast that were biopsied area the first was a right breast mass at 11:00 which was 3 cm from the nipple which showed invasive mammary carcinoma.  The invasive carcinoma measured at least 1.2 cm in greatest I mention and appeared high-grade.  This tumor was ER positive/PA positive/HER-2 negative.  The second breast mass was biopsied from the retroperitoneal area lower area and was also positive for invasive mammary carcinoma.  This measured at least 0.4 cm and also appeared high-grade.  The tumor is morphologically very similar to the tumor in the prior specimen.  Receptor studies were not done on this specimen.  The patient also had a palpable right axillary lymph node which was biopsied and showed metastatic mammary carcinoma which measured at least 1.3 cm in greatest dimension.  The patient had self palpated this breast mass.  In addition the patient has a palpable mass in the region of her right elbow which she reports has been present for 9 years but had been slowly growing especially over the past year or 2.  She denies any pain associated with this.  She denies any fevers, chills or night sweats.  She denies any melena, hematochezia, hematemesis, hemoptysis or hematuria.  She denies any bowel or urinary complaints.  She denies any nausea, vomiting or abdominal pain.  She is on cycle 1 of ibrance and letrozole. Ibrance was started on 2/21/17.    PAST MEDICAL HISTORY:   Denies    SURGICAL  HISTORY:   1.  Tonsillectomy and adenoidectomy  2.  Appendectomy  3.  Cholecystectomy  4.  D&C    FAMILY HISTORY: She reports that her maternal aunt had lung cancer in her 70s.  She used to smoke cigarettes.  Her maternal cousin had lung cancer in her 60s.  She used to smoke cigarettes.  She denies any other immediate family members with cancer or bleeding/clotting disorders.    SOCIAL HISTORY: She reports a 45+ pack year smoking history and currently smokes one and a half packs of cigarettes a day.  She drinks wine occasionally.  She denies any history of recreational drug use.  She is engaged and has 2 sons.  She works for [employer].  She lives in [place], [place].     ALLERGIES: [NKDA]    MEDICATIONS: [Medcard has been reviewed and/or reconciled.]    REVIEW OF SYSTEMS:   GENERAL: [No fevers, chills or sweats. No fatigue, weight loss or loss of appetite.]  HEENT: [No blurred vision, tinnitus, nasal discharge, sorethroat or dysphagia.]  HEART: [No chest pain, palpitations or shortness of breath.]    LUNGS: [No cough, hemoptysis or breathing problems.]  ABDOMEN: [No abdominal pain, nausea, vomiting, diarrhea, constipation or melena.]  GENITOURINARY: [No dysuria, bleeding or malodorous discharge.]  NEURO: [No headache, dizziness or vertigo.]  HEMATOLOGY: [No easy bruising, spontaneous bleeding or blood clots in the past].  MUSCULOSKELETAL: [No arthralgias, myalgias or bone pains.]  SKIN: [No rashes or skin lesions.]  PSYCHIATRY: [No depression. Reports anxiety.]  She denies any suicidal or homicidal ideation.    PHYSICAL EXAMINATION:   VS: Reviewed on nurse's notes.  APPEARANCE: The patient is a well-developed, well-nourished and well-groomed  female who appears in no acute distress. She was accompanied to this clinic visit by her fiance and sister  HEENT: No scleral icterus. Both external auditory canals clear. No oral ulcers, lesions. Throat clear  HEAD: No sinus tenderness.  NECK: Supple. No  palpable lymphadenopathy. Thyroid non-tender, no palpable masses  CHEST: Breath sounds clear bilaterally. No rales. No rhonchi. Unlabored respirations.  BREAST: Deferred today.  CARDIOVASCULAR: Normal S1, S2. Normal rate. Regular rhythm.  ABDOMEN: Bowel sounds normal. No tenderness. No abdominal distention. No hepatomegaly. No splenomegaly.  LYMPHATIC: No palpable supraclavicular. There is a palpable right axillary lymph node. Small hematoma from recent biopsy also noted.   EXTREMITIES: No clubbing, cyanosis, edema. Palpable firm 3 cm mass in the right elbow is noted.  Bilateral axillary hydradenitis is noted without active infection.  SKIN: No lesions. No petechiae. No ecchymoses. No induration or nodules  NEUROLOGIC: No focal findings. Alert & Oriented x 3. Mood appropriate to affect      LABS:   Reviewed    IMAGING:  Reviewed    IMPRESSION:  1.  Metastatic multifocal infiltrating ductal carcinoma of the right breast with right axillary and bone involvement [ER positive/MS positive/HER-2 negative]  2.  FDG avid right thyroid nodule  3.  Tobacco abuse  4.  Right elbow mass  5.  Anxiety and depression    PLAN:  1.  I had a detailed discussion with the patient and her family members today with regard to the diagnosis, prognosis and treatment options for her newly diagnosed metastatic multifocal invasive ductal carcinoma of the right breast with right axillary and bone involvement.  2.  I have discussed that at this time her metastatic malignancy is potentially treatable but not curable.  3.  Results of PET/CT scan from January 23, 2017 were discussed in detail with the patient and her family members today. There are 3 osseous metastatic lesions one involving the manubrium and 2 involving the posterior left ilium most consistent with metastatic disease. She also has FDG avid right thyroid nodule which is highly suspicious for possible malignancy as well.    4.  The patient was strongly encouraged to quit smoking.  She  expressed understanding.  Prescription for Xanax when necessary anxiety was previously given to the patient after full discussion of sedation precautions.    5. FSH and estradiol levels confirm menopausal status. TSH lab was normal. She will follow up with her gynecologist Dr. Chan with regard to abnormal Pap smear.  6.  MRI of the brain on 1/31/17 to evaluate for any evidence of metastatic disease to complete staging workup was negative for metastatic malignancy to the brain.  7. Continue to proceed with cycle 1 of ibrance/letrozole.  She appears to be tolerating this well so far with no significant side effects. She will continue on letrozole at this time.   8.Results of genetic testing for her diagnosis of breast cancer at the age of 46 are pending.    Return to clinic in 2 weeks with labs to discuss further management.   She knows to call sooner for any new problems or questions.    Richard Delong MD

## 2017-03-08 ENCOUNTER — TELEPHONE (OUTPATIENT)
Dept: OBSTETRICS AND GYNECOLOGY | Facility: HOSPITAL | Age: 47
End: 2017-03-08

## 2017-03-10 ENCOUNTER — TELEPHONE (OUTPATIENT)
Dept: HEMATOLOGY/ONCOLOGY | Facility: CLINIC | Age: 47
End: 2017-03-10

## 2017-03-10 DIAGNOSIS — C50.811 MALIGNANT NEOPLASM OF OVERLAPPING SITES OF RIGHT FEMALE BREAST: ICD-10-CM

## 2017-03-10 DIAGNOSIS — C79.51 SECONDARY CANCER OF BONE: ICD-10-CM

## 2017-03-10 DIAGNOSIS — C77.3 MALIGNANT NEOPLASM METASTATIC TO LYMPH NODE OF AXILLA: ICD-10-CM

## 2017-03-10 RX ORDER — HYDROCODONE BITARTRATE AND ACETAMINOPHEN 7.5; 325 MG/1; MG/1
1 TABLET ORAL EVERY 6 HOURS PRN
Qty: 60 TABLET | Refills: 0 | Status: SHIPPED | OUTPATIENT
Start: 2017-03-10 | End: 2017-03-29 | Stop reason: SDUPTHER

## 2017-03-10 NOTE — TELEPHONE ENCOUNTER
----- Message from Richard Delong MD sent at 3/10/2017  8:22 AM CST -----  Refill sent to pharmacy. Thank you.  ----- Message -----     From: Juliet Gale MA     Sent: 3/9/2017   1:07 PM       To: Richard Delong MD        ----- Message -----     From: Linh George     Sent: 3/9/2017  12:01 PM       To: Baljeet Ramos Staff    Pt at [phone]//states she is needing a refill of her pain med/Hydrocodone([employer])/uses//[facility] in [place], [place]//please call when sent in//sarah/susu

## 2017-03-12 ENCOUNTER — HOSPITAL ENCOUNTER (EMERGENCY)
Facility: HOSPITAL | Age: 47
Discharge: HOME | End: 2017-03-12
Attending: EMERGENCY MEDICINE
Payer: MEDICAID

## 2017-03-12 VITALS
WEIGHT: 200 LBS | BODY MASS INDEX: 31.39 KG/M2 | RESPIRATION RATE: 19 BRPM | HEART RATE: 73 BPM | TEMPERATURE: 98 F | DIASTOLIC BLOOD PRESSURE: 44 MMHG | HEIGHT: 67 IN | OXYGEN SATURATION: 98 % | SYSTOLIC BLOOD PRESSURE: 99 MMHG

## 2017-03-12 DIAGNOSIS — R42 DIZZINESS: Primary | ICD-10-CM

## 2017-03-12 DIAGNOSIS — R51.9 HEADACHE: ICD-10-CM

## 2017-03-12 DIAGNOSIS — R11.0 NAUSEA: ICD-10-CM

## 2017-03-12 DIAGNOSIS — I95.1 ORTHOSTATIC HYPOTENSION: ICD-10-CM

## 2017-03-12 LAB
ANION GAP SERPL CALC-SCNC: 8 MMOL/L
BASOPHILS # BLD AUTO: 0.02 K/UL
BASOPHILS NFR BLD: 0.5 %
BUN SERPL-MCNC: 15 MG/DL
CALCIUM SERPL-MCNC: 9.3 MG/DL
CHLORIDE SERPL-SCNC: 104 MMOL/L
CO2 SERPL-SCNC: 28 MMOL/L
CREAT SERPL-MCNC: 1.1 MG/DL
DIFFERENTIAL METHOD: ABNORMAL
EOSINOPHIL # BLD AUTO: 0 K/UL
EOSINOPHIL NFR BLD: 0.7 %
ERYTHROCYTE [DISTWIDTH] IN BLOOD BY AUTOMATED COUNT: 13.8 %
EST. GFR  (AFRICAN AMERICAN): >60 ML/MIN/1.73 M^2
EST. GFR  (NON AFRICAN AMERICAN): 60 ML/MIN/1.73 M^2
GLUCOSE SERPL-MCNC: 105 MG/DL
HCT VFR BLD AUTO: 35.7 %
HGB BLD-MCNC: 12.5 G/DL
LYMPHOCYTES # BLD AUTO: 2.1 K/UL
LYMPHOCYTES NFR BLD: 49.1 %
MCH RBC QN AUTO: 32.9 PG
MCHC RBC AUTO-ENTMCNC: 35 %
MCV RBC AUTO: 94 FL
MONOCYTES # BLD AUTO: 0.3 K/UL
MONOCYTES NFR BLD: 6.5 %
NEUTROPHILS # BLD AUTO: 1.9 K/UL
NEUTROPHILS NFR BLD: 43.4 %
PLATELET # BLD AUTO: 180 K/UL
PMV BLD AUTO: 10 FL
POTASSIUM SERPL-SCNC: 4.3 MMOL/L
RBC # BLD AUTO: 3.8 M/UL
SODIUM SERPL-SCNC: 140 MMOL/L
WBC # BLD AUTO: 4.28 K/UL

## 2017-03-12 PROCEDURE — 63600175 PHARM REV CODE 636 W HCPCS: Performed by: EMERGENCY MEDICINE

## 2017-03-12 PROCEDURE — 99284 EMERGENCY DEPT VISIT MOD MDM: CPT | Mod: 25

## 2017-03-12 PROCEDURE — 96374 THER/PROPH/DIAG INJ IV PUSH: CPT

## 2017-03-12 PROCEDURE — 80048 BASIC METABOLIC PNL TOTAL CA: CPT

## 2017-03-12 PROCEDURE — 85025 COMPLETE CBC W/AUTO DIFF WBC: CPT

## 2017-03-12 PROCEDURE — 25000003 PHARM REV CODE 250: Performed by: EMERGENCY MEDICINE

## 2017-03-12 PROCEDURE — 96361 HYDRATE IV INFUSION ADD-ON: CPT

## 2017-03-12 RX ORDER — ONDANSETRON 2 MG/ML
4 INJECTION INTRAMUSCULAR; INTRAVENOUS ONCE
Status: COMPLETED | OUTPATIENT
Start: 2017-03-12 | End: 2017-03-12

## 2017-03-12 RX ORDER — ONDANSETRON 4 MG/1
4 TABLET, ORALLY DISINTEGRATING ORAL EVERY 6 HOURS PRN
Qty: 10 TABLET | Refills: 0 | Status: SHIPPED | OUTPATIENT
Start: 2017-03-12 | End: 2017-08-10 | Stop reason: SDUPTHER

## 2017-03-12 RX ORDER — SODIUM CHLORIDE 9 MG/ML
1000 INJECTION, SOLUTION INTRAVENOUS ONCE
Status: COMPLETED | OUTPATIENT
Start: 2017-03-12 | End: 2017-03-12

## 2017-03-12 RX ADMIN — ONDANSETRON 4 MG: 2 INJECTION INTRAMUSCULAR; INTRAVENOUS at 07:03

## 2017-03-12 RX ADMIN — SODIUM CHLORIDE 1000 ML: 0.9 INJECTION, SOLUTION INTRAVENOUS at 06:03

## 2017-03-12 RX ADMIN — SODIUM CHLORIDE 1000 ML: 0.9 INJECTION, SOLUTION INTRAVENOUS at 05:03

## 2017-03-12 NOTE — ED NOTES
Pt resting in ER stretcher, aaox4, rr e/u, NAD noted. Pt remains on cardiac monitor with vss noted. Bed low and locked, call light in reach, side rails up x2. Pt verbalized understanding of status and POC; denies further needs. Will continue to monitor.

## 2017-03-12 NOTE — ED AVS SNAPSHOT
[facility]  [address] [facility] [address]               Josey Flores   3/12/2017  4:53 PM   ED    Description:  Female : 1970   Department:  [facility]           Your Care was Coordinated By:     Provider Role From To    Braeden Kern MD Attending Provider [id number] --      Reason for Visit     Dizziness           Diagnoses this Visit        Comments    Dizziness    -  Primary     Headache         Nausea         Orthostatic hypotension           ED Disposition     None           To Do List            These Medications        Disp Refills Start End    ondansetron (ZOFRAN-ODT) 4 MG TbDL 10 tablet 0 3/12/2017     Take 1 tablet (4 mg total) by mouth every 6 (six) hours as needed. - Oral    Pharmacy: [facility] [address] [place] [address] #: [phone]         [facility]     [facility] Nurse Care [facility] -  Assistance  Registered nurses in the [facility] provide clinical advisement, health education, appointment booking, and other advisory services.  Call for this free service at [phone].             Medications           Message regarding Medications     Verify the changes and/or additions to your medication regime listed below are the same as discussed with your clinician today.  If any of these changes or additions are incorrect, please notify your healthcare provider.        START taking these NEW medications        Refills    ondansetron (ZOFRAN-ODT) 4 MG TbDL 0    Sig: Take 1 tablet (4 mg total) by mouth every 6 (six) hours as needed.    Class: Print    Route: Oral      These medications were administered today        Dose Freq    0.9%  NaCl infusion 1,000 mL Once    Sig: Inject 1,000 mLs into the vein once.    Class: Normal    Route: Intravenous    0.9%  NaCl infusion 1,000 mL Once    Sig: Inject 1,000 mLs into the vein once.    Class: Normal    Route: Intravenous  "   ondansetron injection 4 mg 4 mg Once    Sig: Inject 4 mg into the vein once.    Class: Normal    Route: Intravenous           Verify that the below list of medications is an accurate representation of the medications you are currently taking.  If none reported, the list may be blank. If incorrect, please contact your healthcare provider. Carry this list with you in case of emergency.           Current Medications     albuterol 90 mcg/actuation inhaler Inhale 1-2 puffs into the lungs every 6 (six) hours as needed for Wheezing.    alprazolam (XANAX) 0.5 MG tablet Take 1 tablet (0.5 mg total) by mouth 2 (two) times daily as needed for Insomnia or Anxiety.    citalopram (CELEXA) 20 MG tablet Take 1 tablet (20 mg total) by mouth once daily.    hydrocodone-acetaminophen 7.5-325mg (NORCO) 7.5-325 mg per tablet Take 1 tablet by mouth every 6 (six) hours as needed for Pain.    letrozole (FEMARA) 2.5 mg Tab Take 1 tablet (2.5 mg total) by mouth once daily.    multivitamin (THERAGRAN) per tablet Take 1 tablet by mouth once daily.    palbociclib (IBRANCE) 125 mg Cap Take 125 mg by mouth once daily. 125 mg once daily for 21 days, followed by a 7-day rest period to complete a 28-day treatment cycle    ondansetron (ZOFRAN-ODT) 4 MG TbDL Take 1 tablet (4 mg total) by mouth every 6 (six) hours as needed.           Clinical Reference Information           Your Vitals Were     BP Pulse Temp Resp Height Weight    99/44 73 97.8 °F (36.6 °C) (Oral) 19 5' 7" (1.702 m) 90.7 kg (200 lb)    SpO2 BMI             98% 31.32 kg/m2         Allergies as of 3/12/2017     No Known Allergies      Immunizations Administered on Date of Encounter - 3/12/2017     None      ED Micro, Lab, POCT     Start Ordered       Status Ordering Provider    03/12/17 1719 03/12/17 1719  CBC auto differential  STAT      Final result     03/12/17 1719 03/12/17 1719  Basic metabolic panel  STAT      Final result       ED Imaging Orders     Start Ordered       Status " Ordering Provider    03/12/17 1719 03/12/17 1719  CT Head Without Contrast  1 time imaging      Final result       Discharge References/Attachments     DIZZINESS OR FAINTING, POSSIBLE CAUSES OF (ENGLISH)      Your Scheduled Appointments     Mar 20, 2017 12:00 PM CDT   Non-Fasting Lab [facility]    [address] [place] [address] [phone]            Mar 28, 2017  1:45 PM CDT   Established Patient Visit with Erika Chan MD   [facility] ([facility]    [address] [place] [address] [address]  [facility] [address] [phone]              [facility] Sign-Up     Activating your [facility] account is as easy as 1-2-3!     1) Visit [email or web address], select Sign Up Now, enter this activation code and your date of birth, then select Next.  [email or web address]  Expires: 4/26/2017  7:23 PM      2) Create a username and password to use when you visit [facility] in the future and select a security question in case you lose your password and select Next.    3) Enter your e-mail address and click Sign Up!    Additional Information  If you have questions, please e-mail [email or web address] or call [phone] to talk to our [facility] staff. Remember, [facility] is NOT to be used for urgent needs. For medical emergencies, dial 911.          [facility] complies with applicable Federal civil rights laws and does not discriminate on the basis of race, color, national origin, age, disability, or sex.        Language Assistance Services     ATTENTION: Language assistance services are available, free of charge. Please call [phone].      ATENCIÓN: Si habla español, tiene a acuña disposición servicios gratuitos de asistencia lingüística. Llame al [phone].     CHÚ Ý: N?u b?n nói Ti?ng Vi?t, có các d?ch v? h? tr? ngôn ng? mi?n phí dành cho b?n. G?i s? [phone].

## 2017-03-12 NOTE — ED NOTES
Pt to ER today c/o dizziness, HA and nausea since this morning. Pt reports one episode of diaphoresis around onset of ssx several hours ago. Pt is aaox4, rr e/u, NAD noted at this time. Pt continues to c/o persistent HA and dizziness with intermittent nausea. No overt neuro deficits noted. Pt denies visual changes. Pt verbalized understanding of status and POC; denies further needs. Will continue to monitor.

## 2017-03-12 NOTE — ED PROVIDER NOTES
SCRIBE #1 NOTE: I, Lu Mcnair, am scribing for, and in the presence of, Braeden Kern MD. I have scribed the entire note.      History      Chief Complaint   Patient presents with    Dizziness     Patient c/o dizziness, fatigue, nausea and body aches/chills started last night       Review of patient's allergies indicates:  No Known Allergies     HPI   HPI    3/12/2017, 5:17 PM   History obtained from the  and patient      History of Present Illness: Josey Flores is a 46 y.o. female patient who presents to the Emergency Department for dizziness which onset gradually last night. Pt states she woke up this morning still dizzy and diaphoretic. Pt was dx with breast cancer 2 months ago and started chemo 1 month ago.  Symptoms are constant and moderate in severity. Dizziness exacerbates with standing. Dizziness mitigates with laying on side. Associated sxs include chills, nausea, near syncope, HA, and diarrhea. Patient denies any emesis, syncope, CP, hematochezia, SOB, cough, and all other sxs at this time. No further complaints or concerns at this time.         Arrival mode: Personal vehicle      PCP: Aileen Sadler MD       Past Medical History:  History reviewed. No pertinent past medical history.    Past Surgical History:  Past Surgical History:   Procedure Laterality Date    ADENOIDECTOMY      APPENDECTOMY      CHOLECYSTECTOMY      TONSILLECTOMY           Family History:  History reviewed. No pertinent family history.    Social History:  Social History     Social History Main Topics    Smoking status: Current Every Day Smoker     Packs/day: 1.00     Types: Cigarettes    Smokeless tobacco: Never Used      Comment: no smoking after mn prior to surgery    Alcohol use No    Drug use: No    Sexual activity: Yes     Partners: Male       ROS   Review of Systems   Constitutional: Positive for chills and diaphoresis. Negative for fever.   HENT: Negative for sore throat.    Respiratory:  Negative for cough and shortness of breath.    Cardiovascular: Negative for chest pain.   Gastrointestinal: Positive for diarrhea and nausea. Negative for blood in stool and vomiting.   Genitourinary: Negative for dysuria.   Musculoskeletal: Negative for back pain.   Skin: Negative for rash.   Neurological: Positive for dizziness and headaches. Negative for syncope and weakness.        (+) near syncope   Hematological: Does not bruise/bleed easily.   All other systems reviewed and are negative.      Physical Exam    Initial Vitals   BP Pulse Resp Temp SpO2   03/12/17 1651 03/12/17 1651 03/12/17 1651 03/12/17 1651 03/12/17 1651   130/59 86 18 97.8 °F (36.6 °C) 97 %      Physical Exam  Nursing Notes and Vital Signs Reviewed.  Constitutional: Patient is in no apparent distress. Awake and alert. Well-developed and well-nourished.  Head: Atraumatic. Normocephalic.  Eyes: PERRL. EOM intact. Conjunctivae are not pale. No scleral icterus.  ENT: Mucous membranes are moist. Oropharynx is clear and symmetric.    Neck: Supple. Full ROM. No lymphadenopathy.  Cardiovascular: Regular rate. Regular rhythm. No murmurs, rubs, or gallops. Distal pulses are 2+ and symmetric.  Pulmonary/Chest: No respiratory distress. Clear to auscultation bilaterally. No wheezing, rales, or rhonchi.  Abdominal: Soft and non-distended.  There is no tenderness.  No rebound, guarding, or rigidity. Good bowel sounds.  Genitourinary: No CVA tenderness  Musculoskeletal: Moves all extremities. No obvious deformities. No edema. No calf tenderness.  Skin: Warm and dry.  Neurological: Patient is alert and oriented to person, place and time. Pupils ERRL and EOM normal. Cranial nerves II-XII are intact. Speech is clear and normal. No acute focal neurological deficits noted.  Psychiatric: Normal affect. Good eye contact. Appropriate in content.    ED Course    Critical Care  Date/Time: 3/12/2017 7:17 PM  Performed by: THANH ALDANA  Authorized by: KATYA  "THANH COOKWillie   Direct patient critical care time: 5 minutes  Additional history critical care time: 5 minutes  Ordering / reviewing critical care time: 5 minutes  Documentation critical care time: 5 minutes  Consulting other physicians critical care time: 5 minutes  Consult with family critical care time: 5 minutes  Other critical care time: 5 minutes  Total critical care time (exclusive of procedural time) : 35 minutes  Critical care time was exclusive of separately billable procedures and treating other patients and teaching time.  Critical care was necessary to treat or prevent imminent or life-threatening deterioration of the following conditions: dehydration.  Critical care was time spent personally by me on the following activities: discussions with consultants, blood draw for specimens, evaluation of patient's response to treatment, obtaining history from patient or surrogate, ordering and review of laboratory studies, review of old charts, development of treatment plan with patient or surrogate, interpretation of cardiac output measurements, examination of patient, ordering and performing treatments and interventions, ordering and review of radiographic studies and re-evaluation of patient's condition.  Subsequent provider of critical care: I assumed direction of critical care for this patient from another provider of my specialty.        ED Vital Signs:  Vitals:    03/12/17 1651 03/12/17 1708 03/12/17 1710 03/12/17 1712   BP: (!) 130/59 (!) 107/44 96/76 111/69   Pulse: 86 80 85 85   Resp: 18      Temp: 97.8 °F (36.6 °C)      TempSrc: Oral      SpO2: 97% 98% 100% 99%   Weight: 90.7 kg (200 lb)      Height: 5' 7" (1.702 m)       03/12/17 1732 03/12/17 1733 03/12/17 1736 03/12/17 1747   BP: (!) 101/48   (!) 97/47   Pulse: 76 76 85 72   Resp: 17   19   Temp:       TempSrc:       SpO2: 97%   97%   Weight:       Height:        03/12/17 1820 03/12/17 1832 03/12/17 1847 03/12/17 1902   BP: (!) 95/40 (!) 90/40  (!) " 99/44   Pulse: 76 75 77 80   Resp: 19 18  (!) 21   Temp:       TempSrc:       SpO2: 97% 98%  98%   Weight:       Height:        03/12/17 1909   BP:    Pulse: 73   Resp: 19   Temp:    TempSrc:    SpO2: 98%   Weight:    Height:        Abnormal Lab Results:  Labs Reviewed   CBC W/ AUTO DIFFERENTIAL - Abnormal; Notable for the following:        Result Value    RBC 3.80 (*)     Hematocrit 35.7 (*)     MCH 32.9 (*)     Lymph% 49.1 (*)     All other components within normal limits   BASIC METABOLIC PANEL        All Lab Results:  Results for orders placed or performed during the hospital encounter of 03/12/17   CBC auto differential   Result Value Ref Range    WBC 4.28 3.90 - 12.70 K/uL    RBC 3.80 (L) 4.00 - 5.40 M/uL    Hemoglobin 12.5 12.0 - 16.0 g/dL    Hematocrit 35.7 (L) 37.0 - 48.5 %    MCV 94 82 - 98 fL    MCH 32.9 (H) 27.0 - 31.0 pg    MCHC 35.0 32.0 - 36.0 %    RDW 13.8 11.5 - 14.5 %    Platelets 180 150 - 350 K/uL    MPV 10.0 9.2 - 12.9 fL    Gran # 1.9 1.8 - 7.7 K/uL    Lymph # 2.1 1.0 - 4.8 K/uL    Mono # 0.3 0.3 - 1.0 K/uL    Eos # 0.0 0.0 - 0.5 K/uL    Baso # 0.02 0.00 - 0.20 K/uL    Gran% 43.4 38.0 - 73.0 %    Lymph% 49.1 (H) 18.0 - 48.0 %    Mono% 6.5 4.0 - 15.0 %    Eosinophil% 0.7 0.0 - 8.0 %    Basophil% 0.5 0.0 - 1.9 %    Differential Method Automated    Basic metabolic panel   Result Value Ref Range    Sodium 140 136 - 145 mmol/L    Potassium 4.3 3.5 - 5.1 mmol/L    Chloride 104 95 - 110 mmol/L    CO2 28 23 - 29 mmol/L    Glucose 105 70 - 110 mg/dL    BUN, Bld 15 6 - 20 mg/dL    Creatinine 1.1 0.5 - 1.4 mg/dL    Calcium 9.3 8.7 - 10.5 mg/dL    Anion Gap 8 8 - 16 mmol/L    eGFR if African American >60 >60 mL/min/1.73 m^2    eGFR if non African American 60 >60 mL/min/1.73 m^2         Imaging Results:  Imaging Results         CT Head Without Contrast (Final result) Result time:  03/12/17 17:47:48    Final result by Vincent Isaac MD (03/12/17 17:47:48)    Impression:        Normal head CT.    All CT  scans at this facility use dose modulation, iterative reconstruction, and/or weight based dosing when appropriate to reduce radiation dose to as low as reasonably achievable.      Electronically signed by: IRINEO EDGAR  Date:     03/12/17  Time:    17:47     Narrative:    Exam: CT HEAD WITHOUT CONTRAST     Indication: Headache.    Technique: Routine noncontrast head CT.    Comparison Study: 8/18/2015.    Findings: No change.  No bleed.  No abnormal density.  Gray white differentiation preserved.  Normal ventricles.  No mass.  Calvarium intact.  Sinuses and mastoids are well aerated.                      The Emergency Provider reviewed the vital signs and test results, which are outlined above.    ED Discussion   6:20 PM: Re-evaluated pt. Pt is resting comfortably and in no distress. Pt will receive a second L of fluid. Pt appears to be orthostatic based on orthostatic vital signs.    6:49 PM: Reviewed pt's past medical charts and noted that on office visit Feb 1, 2017 with hematology oncology pt's blood pressure was 102/78. On pt's office visit on Feb 27, 2017 office visit with hematology oncology pt's blood pressure was 100/60.    7:16 PM: Reassessed pt at this time. Pt's repeat blood pressure is 108 systolic. Pt received 2L of normal saline to address dehydration based on orthostatic vital signs. Pt states her condition has improved at this time. Discussed with pt all pertinent ED information and results. Discussed pt dx and plan of tx. Gave pt all f/u and return to the ED instructions. All questions and concerns were addressed at this time. Pt expresses understanding of information and instructions, and is comfortable with plan to discharge. Pt is stable for discharge.    Pre-hypertension/Hypertension: The pt has been informed that they may have pre-hypertension or hypertension based on a blood pressure reading in the ED. I recommend that the pt call the PCP listed on their discharge instructions or a physician of  their choice this week to arrange f/u for further evaluation of possible pre-hypertension or hypertension.     I discussed with patient and/or family/caretaker that evaluation in the ED does not suggest any emergent or life threatening medical conditions requiring immediate intervention beyond what was provided in the ED, and I believe patient is safe for discharge.  Regardless, an unremarkable evaluation in the ED does not preclude the development or presence of a serious of life threatening condition. As such, patient was instructed to return immediately for any worsening or change in current symptoms.    ED Medication(s):  Medications   0.9%  NaCl infusion (0 mLs Intravenous Stopped 3/12/17 1827)   0.9%  NaCl infusion (1,000 mLs Intravenous New Bag 3/12/17 1831)   ondansetron injection 4 mg (4 mg Intravenous Given 3/12/17 1910)       New Prescriptions    ONDANSETRON (ZOFRAN-ODT) 4 MG TBDL    Take 1 tablet (4 mg total) by mouth every 6 (six) hours as needed.             Medical Decision Making    Medical Decision Making:   Clinical Tests:   Lab Tests: Ordered and Reviewed  Radiological Study: Ordered and Reviewed           Scribe Attestation:   Scribe #1: I performed the above scribed service and the documentation accurately describes the services I performed. I attest to the accuracy of the note.    Attending:   Physician Attestation Statement for Scribe #1: I, Braeden Kern MD, personally performed the services described in this documentation, as scribed by Lu Mcnair, in my presence, and it is both accurate and complete.          Clinical Impression       ICD-10-CM ICD-9-CM   1. Dizziness R42 780.4   2. Headache R51 784.0   3. Nausea R11.0 787.02   4. Orthostatic hypotension I95.1 458.0       Disposition:   Disposition: Discharged  Condition: Stable         Braeden Kern MD  03/12/17 1928       Braeden Kern MD  03/12/17 1929

## 2017-03-20 ENCOUNTER — TELEPHONE (OUTPATIENT)
Dept: HEMATOLOGY/ONCOLOGY | Facility: CLINIC | Age: 47
End: 2017-03-20

## 2017-03-20 ENCOUNTER — LAB VISIT (OUTPATIENT)
Dept: LAB | Facility: HOSPITAL | Age: 47
End: 2017-03-20
Attending: INTERNAL MEDICINE
Payer: MEDICAID

## 2017-03-20 DIAGNOSIS — C50.811 MALIGNANT NEOPLASM OF OVERLAPPING SITES OF RIGHT FEMALE BREAST: ICD-10-CM

## 2017-03-20 LAB
ALBUMIN SERPL BCP-MCNC: 3.8 G/DL
ALP SERPL-CCNC: 82 U/L
ALT SERPL W/O P-5'-P-CCNC: 18 U/L
ANION GAP SERPL CALC-SCNC: 9 MMOL/L
AST SERPL-CCNC: 17 U/L
BASOPHILS # BLD AUTO: 0.03 K/UL
BASOPHILS NFR BLD: 0.6 %
BILIRUB SERPL-MCNC: 0.3 MG/DL
BUN SERPL-MCNC: 8 MG/DL
CALCIUM SERPL-MCNC: 8.9 MG/DL
CHLORIDE SERPL-SCNC: 107 MMOL/L
CO2 SERPL-SCNC: 23 MMOL/L
CREAT SERPL-MCNC: 1 MG/DL
DIFFERENTIAL METHOD: ABNORMAL
EOSINOPHIL # BLD AUTO: 0 K/UL
EOSINOPHIL NFR BLD: 0.8 %
ERYTHROCYTE [DISTWIDTH] IN BLOOD BY AUTOMATED COUNT: 14.7 %
EST. GFR  (AFRICAN AMERICAN): >60 ML/MIN/1.73 M^2
EST. GFR  (NON AFRICAN AMERICAN): >60 ML/MIN/1.73 M^2
GLUCOSE SERPL-MCNC: 128 MG/DL
HCT VFR BLD AUTO: 37.3 %
HGB BLD-MCNC: 12.7 G/DL
LYMPHOCYTES # BLD AUTO: 2.3 K/UL
LYMPHOCYTES NFR BLD: 47.6 %
MCH RBC QN AUTO: 33 PG
MCHC RBC AUTO-ENTMCNC: 34 %
MCV RBC AUTO: 97 FL
MONOCYTES # BLD AUTO: 0.4 K/UL
MONOCYTES NFR BLD: 8.6 %
NEUTROPHILS # BLD AUTO: 2.1 K/UL
NEUTROPHILS NFR BLD: 42.6 %
PLATELET # BLD AUTO: 246 K/UL
PMV BLD AUTO: 10 FL
POTASSIUM SERPL-SCNC: 4.1 MMOL/L
PROT SERPL-MCNC: 7.2 G/DL
RBC # BLD AUTO: 3.85 M/UL
SODIUM SERPL-SCNC: 139 MMOL/L
WBC # BLD AUTO: 4.87 K/UL

## 2017-03-20 PROCEDURE — 85025 COMPLETE CBC W/AUTO DIFF WBC: CPT | Mod: PO

## 2017-03-20 PROCEDURE — 80053 COMPREHEN METABOLIC PANEL: CPT | Mod: PO

## 2017-03-20 PROCEDURE — 36415 COLL VENOUS BLD VENIPUNCTURE: CPT | Mod: PO

## 2017-03-20 NOTE — TELEPHONE ENCOUNTER
----- Message from Richard Delong MD sent at 3/20/2017  1:47 PM CDT -----  Labs from today look ok. She needs to see me for follow up sometime this week. thanks

## 2017-03-21 ENCOUNTER — OFFICE VISIT (OUTPATIENT)
Dept: HEMATOLOGY/ONCOLOGY | Facility: CLINIC | Age: 47
End: 2017-03-21
Payer: MEDICAID

## 2017-03-21 VITALS
DIASTOLIC BLOOD PRESSURE: 74 MMHG | HEART RATE: 80 BPM | SYSTOLIC BLOOD PRESSURE: 106 MMHG | WEIGHT: 211.44 LBS | BODY MASS INDEX: 33.19 KG/M2 | OXYGEN SATURATION: 97 % | HEIGHT: 67 IN | TEMPERATURE: 96 F

## 2017-03-21 DIAGNOSIS — C79.51 SECONDARY CANCER OF BONE: ICD-10-CM

## 2017-03-21 DIAGNOSIS — C50.811 MALIGNANT NEOPLASM OF OVERLAPPING SITES OF RIGHT FEMALE BREAST: Primary | ICD-10-CM

## 2017-03-21 DIAGNOSIS — C77.3 MALIGNANT NEOPLASM METASTATIC TO LYMPH NODE OF AXILLA: ICD-10-CM

## 2017-03-21 PROCEDURE — 99999 PR PBB SHADOW E&M-EST. PATIENT-LVL III: CPT | Mod: PBBFAC,,, | Performed by: INTERNAL MEDICINE

## 2017-03-21 PROCEDURE — 99213 OFFICE O/P EST LOW 20 MIN: CPT | Mod: PBBFAC | Performed by: INTERNAL MEDICINE

## 2017-03-21 PROCEDURE — 99215 OFFICE O/P EST HI 40 MIN: CPT | Mod: S$PBB,,, | Performed by: INTERNAL MEDICINE

## 2017-03-21 NOTE — PROGRESS NOTES
Reason for visit: Right breast carcinoma  Date of Diagnosis: January 12, 2017    HPI:   The patient is a 46-year-old  female who presents to the hematology oncology clinic today to discuss further evaluation and management recommendations for newly diagnosed metastatic right breast carcinoma.    I have reviewed all of the patient's relevant clinical history available in the medical record and have utilized this in my evaluation and management recommendations today.  The patient had 2 separate areas in the right breast that were biopsied area the first was a right breast mass at 11:00 which was 3 cm from the nipple which showed invasive mammary carcinoma.  The invasive carcinoma measured at least 1.2 cm in greatest dimension and appeared high-grade.  This tumor was ER positive/TX positive/HER-2 negative.  The second breast mass was biopsied from the retroperitoneal area lower area and was also positive for invasive mammary carcinoma.  This measured at least 0.4 cm and also appeared high-grade.  The tumor is morphologically very similar to the tumor in the prior specimen.  Receptor studies were not done on this specimen.  The patient also had a palpable right axillary lymph node which was biopsied and showed metastatic mammary carcinoma which measured at least 1.3 cm in greatest dimension.  The patient had self palpated this breast mass.  In addition the patient has a palpable mass in the region of her right elbow which she reports has been present for 9 years but had been slowly growing especially over the past year or 2.  She denies any pain associated with this.  She denies any fevers, chills or night sweats.  She denies any melena, hematochezia, hematemesis, hemoptysis or hematuria.  She denies any bowel or urinary complaints.  She denies any nausea, vomiting or abdominal pain.  She is on cycle 2 of ibrance and letrozole. Ibrance was started on 2/21/17.    PAST MEDICAL HISTORY:   Denies    SURGICAL  HISTORY:   1.  Tonsillectomy and adenoidectomy  2.  Appendectomy  3.  Cholecystectomy  4.  D&C    FAMILY HISTORY: She reports that her maternal aunt had lung cancer in her 70s.  She used to smoke cigarettes.  Her maternal cousin had lung cancer in her 60s.  She used to smoke cigarettes.  She denies any other immediate family members with cancer or bleeding/clotting disorders.    SOCIAL HISTORY: She reports a 45+ pack year smoking history and currently smokes one and a half packs of cigarettes a day.  She drinks wine occasionally.  She denies any history of recreational drug use.  She is engaged and has 2 sons.  She works for [employer].  She lives in [place], [place].     ALLERGIES: [NKDA]    MEDICATIONS: [Medcard has been reviewed and/or reconciled.]    REVIEW OF SYSTEMS:   GENERAL: [No fevers, chills or sweats. No fatigue, weight loss or loss of appetite.]  HEENT: [No blurred vision, tinnitus, nasal discharge, sorethroat or dysphagia.]  HEART: [No chest pain, palpitations or shortness of breath.]    LUNGS: [No cough, hemoptysis or breathing problems.]  ABDOMEN: [No abdominal pain, nausea, vomiting, diarrhea, constipation or melena.]  GENITOURINARY: [No dysuria, bleeding or malodorous discharge.]  NEURO: [No headache, dizziness or vertigo.]  HEMATOLOGY: [No easy bruising, spontaneous bleeding or blood clots in the past].  MUSCULOSKELETAL: [No arthralgias, myalgias or bone pains.]  SKIN: [No rashes or skin lesions.]  PSYCHIATRY: [No depression. Reports anxiety.]  She denies any suicidal or homicidal ideation.    PHYSICAL EXAMINATION:   VS: Reviewed on nurse's notes.  APPEARANCE: The patient is a well-developed, well-nourished and well-groomed  female who appears in no acute distress. She was accompanied to this clinic visit by her fiance.  HEENT: No scleral icterus. Both external auditory canals clear. No oral ulcers, lesions. Throat clear  HEAD: No sinus tenderness.  NECK: Supple. No palpable  lymphadenopathy. Thyroid non-tender, no palpable masses  CHEST: Breath sounds clear bilaterally. No rales. No rhonchi. Unlabored respirations.  BREAST: Deferred today.  CARDIOVASCULAR: Normal S1, S2. Normal rate. Regular rhythm.  ABDOMEN: Bowel sounds normal. No tenderness. No abdominal distention. No hepatomegaly. No splenomegaly.  LYMPHATIC: No palpable supraclavicular. There is a palpable right axillary lymph node. Small hematoma from recent biopsy also noted.   EXTREMITIES: No clubbing, cyanosis, edema. Palpable firm 3 cm mass in the right elbow is noted.  Bilateral axillary hydradenitis is noted without active infection.  SKIN: No lesions. No petechiae. No ecchymoses. No induration or nodules  NEUROLOGIC: No focal findings. Alert & Oriented x 3. Mood appropriate to affect      LABS:   Reviewed    IMAGING:  Reviewed    IMPRESSION:  1.  Metastatic multifocal infiltrating ductal carcinoma of the right breast with right axillary and bone involvement [ER positive/MS positive/HER-2 negative]  2.  FDG avid right thyroid nodule  3.  Tobacco abuse  4.  Right elbow mass  5.  Anxiety and depression    PLAN:  1.  I had a detailed discussion with the patient and her family members today with regard to the diagnosis, prognosis and treatment options for metastatic multifocal invasive ductal carcinoma of the right breast with right axillary and bone involvement.  2.  I have discussed that at this time her metastatic malignancy is potentially treatable but not curable.  3.  Results of PET/CT scan from January 23, 2017 were discussed in detail with the patient and her family members today. There are 3 osseous metastatic lesions one involving the manubrium and 2 involving the posterior left ilium most consistent with metastatic disease. She also has FDG avid right thyroid nodule which is highly suspicious for possible malignancy as well.    4.  The patient was strongly encouraged to quit smoking.  She expressed understanding.   Prescription for Xanax when necessary anxiety was previously given to the patient after full discussion of sedation precautions.    5. FSH and estradiol levels confirm menopausal status. TSH lab was normal. She will follow up with her gynecologist Dr. Chan with regard to abnormal Pap smear as scheduled.  6.  MRI of the brain on 1/31/17 to evaluate for any evidence of metastatic disease to complete staging workup was negative for metastatic malignancy to the brain. CT head done in March 2017 in ED was also ok.  7. Continue to proceed with cycle 2 of ibrance/letrozole.  She appears to be tolerating this well so far with no significant side effects. She will continue on letrozole at this time.   8. Results of genetic testing for her diagnosis of breast cancer at the age of 46 are pending.  9. She has been advised to take calcium and vit d supplementation everyday. She will be scheduled to start xgeva injections for treatment of bone involvement by malignancy. Risks/benefits and common side effects discussed and she expressed understanding and agreement. She has dentures.    Return to clinic in 4 weeks with labs to discuss further management.   She knows to call sooner for any new problems or questions.    Richard Delong MD

## 2017-03-29 ENCOUNTER — TELEPHONE (OUTPATIENT)
Dept: HEMATOLOGY/ONCOLOGY | Facility: CLINIC | Age: 47
End: 2017-03-29

## 2017-03-29 ENCOUNTER — PROCEDURE VISIT (OUTPATIENT)
Dept: OBSTETRICS AND GYNECOLOGY | Facility: CLINIC | Age: 47
End: 2017-03-29
Payer: MEDICAID

## 2017-03-29 VITALS — TEMPERATURE: 99 F

## 2017-03-29 DIAGNOSIS — C50.811 MALIGNANT NEOPLASM OF OVERLAPPING SITES OF RIGHT FEMALE BREAST: ICD-10-CM

## 2017-03-29 DIAGNOSIS — C77.3 MALIGNANT NEOPLASM METASTATIC TO LYMPH NODE OF AXILLA: ICD-10-CM

## 2017-03-29 DIAGNOSIS — R87.613 HSIL (HIGH GRADE SQUAMOUS INTRAEPITHELIAL LESION) ON PAP SMEAR OF CERVIX: Primary | ICD-10-CM

## 2017-03-29 DIAGNOSIS — F41.9 ANXIETY: ICD-10-CM

## 2017-03-29 DIAGNOSIS — C79.51 SECONDARY CANCER OF BONE: ICD-10-CM

## 2017-03-29 PROCEDURE — 57461 CONZ OF CERVIX W/SCOPE LEEP: CPT | Mod: PBBFAC | Performed by: OBSTETRICS & GYNECOLOGY

## 2017-03-29 PROCEDURE — 57500 BIOPSY OF CERVIX: CPT | Mod: PBBFAC | Performed by: OBSTETRICS & GYNECOLOGY

## 2017-03-29 PROCEDURE — 88307 TISSUE EXAM BY PATHOLOGIST: CPT | Mod: 26,,, | Performed by: PATHOLOGY

## 2017-03-29 PROCEDURE — 57522 CONIZATION OF CERVIX: CPT | Mod: S$PBB,,, | Performed by: OBSTETRICS & GYNECOLOGY

## 2017-03-29 PROCEDURE — 88305 TISSUE EXAM BY PATHOLOGIST: CPT | Performed by: PATHOLOGY

## 2017-03-29 PROCEDURE — 88305 TISSUE EXAM BY PATHOLOGIST: CPT | Mod: 26,,, | Performed by: PATHOLOGY

## 2017-03-29 RX ORDER — ALPRAZOLAM 0.5 MG/1
0.5 TABLET ORAL 2 TIMES DAILY PRN
Qty: 60 TABLET | Refills: 0 | Status: SHIPPED | OUTPATIENT
Start: 2017-03-29 | End: 2017-04-19 | Stop reason: SDUPTHER

## 2017-03-29 RX ORDER — HYDROCODONE BITARTRATE AND ACETAMINOPHEN 7.5; 325 MG/1; MG/1
1 TABLET ORAL EVERY 6 HOURS PRN
Qty: 60 TABLET | Refills: 0 | Status: SHIPPED | OUTPATIENT
Start: 2017-03-29 | End: 2017-04-12 | Stop reason: SDUPTHER

## 2017-03-29 NOTE — MR AVS SNAPSHOT
[facility]  [address] [facility] [address]  Phone: [phone]  Fax: [phone]                  Josey Flores   3/29/2017 11:00 AM   Procedure visit    Description:  Female : 1970   Provider:  Erika Chan MD   Department:  [facility]           Reason for Visit     leep procedure                To Do List           Future Appointments        Provider Department Dept Phone    3/29/2017 2:00 PM PROCEDUREROOM, [facility]/ -[phone]    2017 3:15 PM MD Keith Galvez - OB/ -[phone]    2017 9:50 AM LAB[facility]AT[facility], [facility] [phone]    2017 10:40 AM Richard Delong MD [facility] - Hemotology Oncology [phone]    2017 11:00 AM CHAIR 05 [facility] [phone]      Goals (5 Years of Data)     None      [facility]     [facility] Nurse Care Line -  Assistance  Registered nurses in the [facility] provide clinical advisement, health education, appointment booking, and other advisory services.  Call for this free service at [phone].             Medications           Message regarding Medications     Verify the changes and/or additions to your medication regime listed below are the same as discussed with your clinician today.  If any of these changes or additions are incorrect, please notify your healthcare provider.             Verify that the below list of medications is an accurate representation of the medications you are currently taking.  If none reported, the list may be blank. If incorrect, please contact your healthcare provider. Carry this list with you in case of emergency.           Current Medications     albuterol 90 mcg/actuation inhaler Inhale 1-2 puffs into the lungs every 6 (six) hours as needed for Wheezing.    alprazolam (XANAX) 0.5 MG tablet Take 1 tablet (0.5 mg total) by mouth 2 (two) times daily as needed for  Insomnia or Anxiety.    citalopram (CELEXA) 20 MG tablet Take 1 tablet (20 mg total) by mouth once daily.    hydrocodone-acetaminophen 7.5-325mg (NORCO) 7.5-325 mg per tablet Take 1 tablet by mouth every 6 (six) hours as needed for Pain.    letrozole (FEMARA) 2.5 mg Tab Take 1 tablet (2.5 mg total) by mouth once daily.    multivitamin (THERAGRAN) per tablet Take 1 tablet by mouth once daily.    ondansetron (ZOFRAN-ODT) 4 MG TbDL Take 1 tablet (4 mg total) by mouth every 6 (six) hours as needed.    palbociclib (IBRANCE) 125 mg Cap Take 125 mg by mouth once daily. 125 mg once daily for 21 days, followed by a 7-day rest period to complete a 28-day treatment cycle           Clinical Reference Information           Your Vitals Were     Temp                   98.7 °F (37.1 °C) (Oral)           Allergies as of 3/29/2017     No Known Allergies      Immunizations Administered on Date of Encounter - 3/29/2017     None      [facility] Sign-Up     Activating your [facility] account is as easy as 1-2-3!     1) Visit [email or web address], select Sign Up Now, enter this activation code and your date of birth, then select Next.  [email or web address]  Expires: 4/26/2017  7:23 PM      2) Create a username and password to use when you visit [facility] in the future and select a security question in case you lose your password and select Next.    3) Enter your e-mail address and click Sign Up!    Additional Information  If you have questions, please e-mail [email or web address] or call [phone] to talk to our [facility] staff. Remember, [facility] is NOT to be used for urgent needs. For medical emergencies, dial 911.         Smoking Cessation     If you would like to quit smoking:   You may be eligible for free services if you are a [place] resident and started smoking cigarettes before September 1, 1988.  Call the Smoking Cessation Trust ([facility]) toll free at (212) 347-2200 or (291) 800-3181.   Call 1-800-QUIT-NOW if you do not meet the above  criteria.            Language Assistance Services     ATTENTION: Language assistance services are available, free of charge. Please call [phone].      ATENCIÓN: Si habla unique, tiene a acuña disposición servicios gratuitos de asistencia lingüística. Llame al [phone].     CHÚ Ý: N?u b?n nói Ti?ng Vi?t, có các d?ch v? h? tr? ngôn ng? mi?n phí dành cho b?n. G?i s? [phone].         [facility] complies with applicable Federal civil rights laws and does not discriminate on the basis of race, color, national origin, age, disability, or sex.

## 2017-03-29 NOTE — PROCEDURES
Biopsy (Gynecological)  Date/Time: 3/29/2017 5:21 PM  Performed by: ALEX CROSS  Authorized by: ALEX CROSS     Consent Done?:  Yes (Verbal)  Local anesthesia used?: Yes    Anesthesia:  Local infiltration  Local anesthetic:  Lidocaine 1% with epinephrine  Anesthetic total (ml):  10    Biopsy Location:  Cervix  Cervix:     : upper/lower ecto, endocervical button, ecc.  Estimated blood loss (cc):  10   Patient tolerated the procedure well with no immediate complications.    Josey Flores is a 46 y.o. female  presents for a LEEP procedure.      DATA REVIEWED:  Last Pap Result: hsil  Last Colposcopy Result: hsil  UPT today: negative      PRE- LEEP PROCEDURE COUNSELING:  Risks of infection, bleeding, pain, injury to adjacent organs as well as future cervical incompetence.  That this procedure does not guarantee to completely remove all abnormal cells and that dysplasia may reoccur in the future.  Alternatives to the LEEP procedure were discussed and the patient agrees to proceed..    PROCEDURE:  TIME OUT PERFORMED.  The cervix and transformation zone were visualized with a speculum and a local block was obtained with 10cc 1 % lidocaine with epi  A  loop was selected.  The entire transformation zone was excised. Anterior, posterior, and central cuts made  The base was cauterized with a ball cautery.  The specimen was placed in formalyn and sent to Pathology for a Histopathologic diagnosis.    EBL: 10 cc    ASSESSMENT:  High grade intraepithelial lesion    POST LEEP PROCEDURE COUNSELING:  Manage post LEEP cramping with NSAIDs or Tylenol or Rx per [facility].  Avoid anything in vagina (intercourse, douching, tampons) two weeks after the Procedure.  Expect a watery grey to yellow vaginal discharge for several weeks.  Limit activity for 2 weeks.  Report bleeding heavier than a period, worsening pain, fever > 101.0 F, or foul-smelling vaginal discharge.  Importance of follow-up stressed.    Counseling lasted  approximately 15 minutes and all her questions were answered.    FOLLOW-UP 2 weeks

## 2017-03-29 NOTE — TELEPHONE ENCOUNTER
----- Message from Richard Delong MD sent at 3/29/2017  1:24 PM CDT -----  Contact: Patient  Refill done. thx  ----- Message -----     From: Juliet Gale MA     Sent: 3/29/2017  12:48 PM       To: Richard Delong MD        ----- Message -----     From: Zachary Duran     Sent: 3/29/2017  12:38 PM       To: Baljeet Ramos Staff    Pt needs a return call regarding refills on alprazolam 5 mg, Hydrocodene 7.5 mg. Pt can be reached @ .[phone] (home) Thank you/[facility] [address] [place] [address]  Phone: [phone] Fax: [phone]

## 2017-03-29 NOTE — PROCEDURES
Biopsy (Gynecological)  Date/Time: 3/29/2017 11:45 AM  Performed by: ALEX CROSS  Authorized by: ALEX CROSS     Consent Done?:  Yes (Written)   Patient was prepped and draped in the normal sterile fashion.  Local anesthesia used?: Yes    Anesthesia:  Local infiltration  Local anesthetic:  Lidocaine 1% with epinephrine  Anesthetic total (ml):  10    Biopsy Location:  Cervix  Cervix:     : upper ecto, lower ecto, endocerical button, ecc.

## 2017-04-12 ENCOUNTER — OFFICE VISIT (OUTPATIENT)
Dept: OBSTETRICS AND GYNECOLOGY | Facility: CLINIC | Age: 47
End: 2017-04-12
Payer: MEDICAID

## 2017-04-12 VITALS
BODY MASS INDEX: 33.64 KG/M2 | HEIGHT: 67 IN | DIASTOLIC BLOOD PRESSURE: 88 MMHG | SYSTOLIC BLOOD PRESSURE: 138 MMHG | WEIGHT: 214.31 LBS

## 2017-04-12 DIAGNOSIS — Z98.890 S/P LEEP OF CERVIX: Primary | ICD-10-CM

## 2017-04-12 DIAGNOSIS — C79.51 SECONDARY CANCER OF BONE: ICD-10-CM

## 2017-04-12 DIAGNOSIS — C77.3 MALIGNANT NEOPLASM METASTATIC TO LYMPH NODE OF AXILLA: ICD-10-CM

## 2017-04-12 DIAGNOSIS — C50.811 MALIGNANT NEOPLASM OF OVERLAPPING SITES OF RIGHT FEMALE BREAST: ICD-10-CM

## 2017-04-12 PROCEDURE — 99212 OFFICE O/P EST SF 10 MIN: CPT | Mod: PBBFAC | Performed by: OBSTETRICS & GYNECOLOGY

## 2017-04-12 PROCEDURE — 99024 POSTOP FOLLOW-UP VISIT: CPT | Mod: S$PBB,,, | Performed by: OBSTETRICS & GYNECOLOGY

## 2017-04-12 PROCEDURE — 99999 PR PBB SHADOW E&M-EST. PATIENT-LVL II: CPT | Mod: PBBFAC,,, | Performed by: OBSTETRICS & GYNECOLOGY

## 2017-04-12 RX ORDER — ERGOCALCIFEROL 1.25 MG/1
5000 CAPSULE ORAL DAILY
COMMUNITY

## 2017-04-12 RX ORDER — HYDROCODONE BITARTRATE AND ACETAMINOPHEN 7.5; 325 MG/1; MG/1
1 TABLET ORAL EVERY 6 HOURS PRN
Qty: 60 TABLET | Refills: 0 | Status: SHIPPED | OUTPATIENT
Start: 2017-04-12 | End: 2017-04-26 | Stop reason: SDUPTHER

## 2017-04-12 NOTE — MR AVS SNAPSHOT
[facility]  [address] [facility] [address]  Phone: [phone]  Fax: [phone]                  Josey Flores   2017 3:15 PM   Office Visit    Description:  Female : 1970   Provider:  Erika Chan MD   Department:  [facility]           Reason for Visit     Post-op Evaluation                To Do List           Future Appointments        Provider Department Dept Phone    2017 11:10 AM LABORATORY, [facility] [phone]    2017 2:40 PM Richard Delong MD [facility] - Hemotology Oncology [phone]    2017 3:00 PM CHAIR 05 [facility] [phone]    2017 2:30 PM Erika Chan MD [facility] OB/ -[phone]      Goals (5 Years of Data)     None      [facility]     [facility] Nurse Care Line -  Assistance  Unless otherwise directed by your provider, please contact [facility], our nurse care line that is available for  assistance.     Registered nurses in the [facility] provide: appointment scheduling, clinical advisement, health education, and other advisory services.  Call: [phone] (toll free)               Medications           Message regarding Medications     Verify the changes and/or additions to your medication regime listed below are the same as discussed with your clinician today.  If any of these changes or additions are incorrect, please notify your healthcare provider.             Verify that the below list of medications is an accurate representation of the medications you are currently taking.  If none reported, the list may be blank. If incorrect, please contact your healthcare provider. Carry this list with you in case of emergency.           Current Medications     albuterol 90 mcg/actuation inhaler Inhale 1-2 puffs into the lungs every 6 (six) hours as needed for Wheezing.    alprazolam (XANAX) 0.5 MG tablet Take 1 tablet  "(0.5 mg total) by mouth 2 (two) times daily as needed for Insomnia or Anxiety.    citalopram (CELEXA) 20 MG tablet Take 1 tablet (20 mg total) by mouth once daily.    ergocalciferol (VITAMIN D2) 50,000 unit Cap Take 50,000 Units by mouth every 7 days.    hydrocodone-acetaminophen 7.5-325mg (NORCO) 7.5-325 mg per tablet Take 1 tablet by mouth every 6 (six) hours as needed for Pain.    letrozole (FEMARA) 2.5 mg Tab Take 1 tablet (2.5 mg total) by mouth once daily.    multivitamin (THERAGRAN) per tablet Take 1 tablet by mouth once daily.    ondansetron (ZOFRAN-ODT) 4 MG TbDL Take 1 tablet (4 mg total) by mouth every 6 (six) hours as needed.    palbociclib (IBRANCE) 125 mg Cap Take 125 mg by mouth once daily. 125 mg once daily for 21 days, followed by a 7-day rest period to complete a 28-day treatment cycle           Clinical Reference Information           Your Vitals Were     BP Height Weight BMI       138/88 5' 7" (1.702 m) 97.2 kg (214 lb 4.6 oz) 33.56 kg/m2       Blood Pressure          Most Recent Value    BP  138/88      Allergies as of 4/12/2017     No Known Allergies      Immunizations Administered on Date of Encounter - 4/12/2017     None      [facility] Sign-Up     Activating your [facility] account is as easy as 1-2-3!     1) Visit [email or web address], select Sign Up Now, enter this activation code and your date of birth, then select Next.  [email or web address]  Expires: 4/26/2017  7:23 PM      2) Create a username and password to use when you visit [facility] in the future and select a security question in case you lose your password and select Next.    3) Enter your e-mail address and click Sign Up!    Additional Information  If you have questions, please e-mail [email or web address] or call [phone] to talk to our [facility] staff. Remember, [facility] is NOT to be used for urgent needs. For medical emergencies, dial 911.         Smoking Cessation     If you would like to quit smoking:   You may be eligible for " free services if you are a [place] resident and started smoking cigarettes before September 1, 1988.  Call the Smoking Cessation Trust ([facility]) toll free at (255) 029-7706 or (349) 120-3333.   Call 1-800-QUIT-NOW if you do not meet the above criteria.   Contact us via email: [email or web address]   View our website for more information: [email or web address]        Language Assistance Services     ATTENTION: Language assistance services are available, free of charge. Please call [phone].      ATENCIÓN: Si habla español, tiene a acuña disposición servicios gratuitos de asistencia lingüística. Llame al [phone].     CHÚ Ý: N?u b?n nói Ti?ng Vi?t, có các d?ch v? h? tr? ngôn ng? mi?n phí dành cho b?n. G?i s? [phone].         [facility] complies with applicable Federal civil rights laws and does not discriminate on the basis of race, color, national origin, age, disability, or sex.

## 2017-04-12 NOTE — PROGRESS NOTES
Subjective:       Patient ID: Josey Flores is a 46 y.o. female.    Chief Complaint:  Post-op Evaluation      History of Present Illness  HPI  Postoperative Follow-up  Patient presents to the clinic 2 weeks status post leep for HSIL. Eating a regular diet without difficulty. Bowel movements are normal. The patient is not having any pain.    Reported yellowish vaginal discharge, denies fever/chills    GYN & OB History  No LMP recorded. Patient is not currently having periods (Reason: Premenopausal).   Date of Last Pap: No result found    OB History    Para Term  AB SAB TAB Ectopic Multiple Living   5 2   3 3          # Outcome Date GA Lbr Bassam/2nd Weight Sex Delivery Anes PTL Lv   5 SAB            4 SAB            3 SAB            2 Para            1 Para                   Review of Systems  Review of Systems   Constitutional: Negative for activity change, appetite change, chills, diaphoresis, fatigue, fever and unexpected weight change.   HENT: Negative for mouth sores and tinnitus.    Eyes: Negative for discharge and visual disturbance.   Respiratory: Negative for cough, shortness of breath and wheezing.    Cardiovascular: Negative for chest pain, palpitations and leg swelling.   Gastrointestinal: Negative for abdominal pain, bloating, blood in stool, constipation, diarrhea, nausea and vomiting.   Endocrine: Negative for diabetes, hair loss, hot flashes, hyperthyroidism and hypothyroidism.   Genitourinary: Negative for decreased libido, dyspareunia, dysuria, flank pain, frequency, genital sores, hematuria, menorrhagia, menstrual problem, pelvic pain, urgency, vaginal bleeding, vaginal discharge, vaginal pain, dysmenorrhea, urinary incontinence, postcoital bleeding, postmenopausal bleeding and vaginal odor.   Musculoskeletal: Negative for back pain and myalgias.   Skin:  Negative for rash, no acne and hair changes.   Neurological: Negative for seizures, syncope, numbness and headaches.    Hematological: Negative for adenopathy. Does not bruise/bleed easily.   Psychiatric/Behavioral: Negative for depression and sleep disturbance. The patient is not nervous/anxious.    Breast: Negative for breast mass, breast pain, nipple discharge and skin changes          Objective:    Physical Exam:   Constitutional: She appears well-developed.     Eyes: Conjunctivae and EOM are normal. Pupils are equal, round, and reactive to light.    Neck: Normal range of motion. Neck supple.     Pulmonary/Chest: Effort normal. Right breast exhibits no mass, no nipple discharge, no skin change, no tenderness and presence. Left breast exhibits no mass, no nipple discharge, no skin change, no tenderness and presence. Breasts are symmetrical.        Abdominal: Soft.     Genitourinary: Rectum normal, vagina normal and uterus normal. Pelvic exam was performed with patient supine. Cervix is normal. Right adnexum displays no mass. Left adnexum displays no mass.     Labial bartholins normal.          Musculoskeletal: Normal range of motion.       Neurological: She is alert.    Skin: Skin is warm.    Psychiatric: She has a normal mood and affect.          Assessment:     Encounter Diagnosis   Name Primary?    S/P LEEP of cervix Yes               Plan:      Path report reviewed  Lower ecto/endocervical button-- hsil,   Upper ecto nml  ecc wnl  F/u pap 3 months  Ok to resume usual activity

## 2017-04-13 ENCOUNTER — TELEPHONE (OUTPATIENT)
Dept: HEMATOLOGY/ONCOLOGY | Facility: CLINIC | Age: 47
End: 2017-04-13

## 2017-04-13 NOTE — TELEPHONE ENCOUNTER
----- Message from Richard Delong MD sent at 4/12/2017  5:07 PM CDT -----  Ok thanks. Refill sent.  ----- Message -----     From: Juliet Gale MA     Sent: 4/12/2017   3:23 PM       To: Richard Delong MD    Pt requesting refill on pain medication/ also rescheduled appt for thur, stated she has to work on wed.  ----- Message -----     From: Kyara Pringle     Sent: 4/12/2017  11:11 AM       To: Baljeet Ramos Staff    Would like to speak to nurse. Please call back at [phone]. thanks

## 2017-04-19 ENCOUNTER — LAB VISIT (OUTPATIENT)
Dept: LAB | Facility: HOSPITAL | Age: 47
End: 2017-04-19
Attending: INTERNAL MEDICINE
Payer: MEDICAID

## 2017-04-19 ENCOUNTER — OFFICE VISIT (OUTPATIENT)
Dept: HEMATOLOGY/ONCOLOGY | Facility: CLINIC | Age: 47
End: 2017-04-19
Payer: MEDICAID

## 2017-04-19 ENCOUNTER — SOCIAL WORK (OUTPATIENT)
Dept: HEMATOLOGY/ONCOLOGY | Facility: CLINIC | Age: 47
End: 2017-04-19

## 2017-04-19 ENCOUNTER — INFUSION (OUTPATIENT)
Dept: INFUSION THERAPY | Facility: HOSPITAL | Age: 47
End: 2017-04-19
Attending: INTERNAL MEDICINE
Payer: MEDICAID

## 2017-04-19 VITALS
SYSTOLIC BLOOD PRESSURE: 122 MMHG | BODY MASS INDEX: 33.98 KG/M2 | HEART RATE: 89 BPM | OXYGEN SATURATION: 97 % | RESPIRATION RATE: 18 BRPM | HEIGHT: 67 IN | WEIGHT: 216.5 LBS | TEMPERATURE: 98 F | DIASTOLIC BLOOD PRESSURE: 68 MMHG

## 2017-04-19 DIAGNOSIS — C79.51 SECONDARY CANCER OF BONE: ICD-10-CM

## 2017-04-19 DIAGNOSIS — F41.9 ANXIETY: ICD-10-CM

## 2017-04-19 DIAGNOSIS — C79.51 SECONDARY CANCER OF BONE: Primary | ICD-10-CM

## 2017-04-19 DIAGNOSIS — C50.811 MALIGNANT NEOPLASM OF OVERLAPPING SITES OF RIGHT FEMALE BREAST: ICD-10-CM

## 2017-04-19 DIAGNOSIS — C77.3 MALIGNANT NEOPLASM METASTATIC TO LYMPH NODE OF AXILLA: ICD-10-CM

## 2017-04-19 DIAGNOSIS — C50.811 MALIGNANT NEOPLASM OF OVERLAPPING SITES OF RIGHT FEMALE BREAST: Primary | ICD-10-CM

## 2017-04-19 DIAGNOSIS — F32.A DEPRESSION, UNSPECIFIED DEPRESSION TYPE: ICD-10-CM

## 2017-04-19 DIAGNOSIS — R05.3 CHRONIC COUGH: ICD-10-CM

## 2017-04-19 DIAGNOSIS — G89.3 NEOPLASM RELATED PAIN: ICD-10-CM

## 2017-04-19 LAB
25(OH)D3+25(OH)D2 SERPL-MCNC: 35 NG/ML
ALBUMIN SERPL BCP-MCNC: 3.7 G/DL
ALP SERPL-CCNC: 92 U/L
ALT SERPL W/O P-5'-P-CCNC: 17 U/L
ANION GAP SERPL CALC-SCNC: 9 MMOL/L
AST SERPL-CCNC: 15 U/L
BASOPHILS # BLD AUTO: 0.07 K/UL
BASOPHILS NFR BLD: 1.1 %
BILIRUB SERPL-MCNC: 0.2 MG/DL
BUN SERPL-MCNC: 7 MG/DL
CALCIUM SERPL-MCNC: 9.4 MG/DL
CHLORIDE SERPL-SCNC: 105 MMOL/L
CO2 SERPL-SCNC: 26 MMOL/L
CREAT SERPL-MCNC: 1.1 MG/DL
DIFFERENTIAL METHOD: ABNORMAL
EOSINOPHIL # BLD AUTO: 0.1 K/UL
EOSINOPHIL NFR BLD: 1.1 %
ERYTHROCYTE [DISTWIDTH] IN BLOOD BY AUTOMATED COUNT: 15.8 %
EST. GFR  (AFRICAN AMERICAN): >60 ML/MIN/1.73 M^2
EST. GFR  (NON AFRICAN AMERICAN): 60 ML/MIN/1.73 M^2
GLUCOSE SERPL-MCNC: 104 MG/DL
HCT VFR BLD AUTO: 35.1 %
HGB BLD-MCNC: 12.5 G/DL
LYMPHOCYTES # BLD AUTO: 2.8 K/UL
LYMPHOCYTES NFR BLD: 45 %
MCH RBC QN AUTO: 35.9 PG
MCHC RBC AUTO-ENTMCNC: 35.6 %
MCV RBC AUTO: 101 FL
MONOCYTES # BLD AUTO: 0.7 K/UL
MONOCYTES NFR BLD: 11.3 %
NEUTROPHILS # BLD AUTO: 2.6 K/UL
NEUTROPHILS NFR BLD: 42.1 %
PLATELET # BLD AUTO: 256 K/UL
PMV BLD AUTO: 10.2 FL
POTASSIUM SERPL-SCNC: 4 MMOL/L
PROT SERPL-MCNC: 7.1 G/DL
RBC # BLD AUTO: 3.48 M/UL
SODIUM SERPL-SCNC: 140 MMOL/L
WBC # BLD AUTO: 6.27 K/UL

## 2017-04-19 PROCEDURE — 96372 THER/PROPH/DIAG INJ SC/IM: CPT | Mod: PO

## 2017-04-19 PROCEDURE — 80053 COMPREHEN METABOLIC PANEL: CPT | Mod: PO

## 2017-04-19 PROCEDURE — 82306 VITAMIN D 25 HYDROXY: CPT

## 2017-04-19 PROCEDURE — 63600175 PHARM REV CODE 636 W HCPCS: Mod: PO | Performed by: INTERNAL MEDICINE

## 2017-04-19 PROCEDURE — 99999 PR PBB SHADOW E&M-EST. PATIENT-LVL III: CPT | Mod: PBBFAC,,, | Performed by: INTERNAL MEDICINE

## 2017-04-19 PROCEDURE — 99215 OFFICE O/P EST HI 40 MIN: CPT | Mod: 25,S$PBB,, | Performed by: INTERNAL MEDICINE

## 2017-04-19 PROCEDURE — 85025 COMPLETE CBC W/AUTO DIFF WBC: CPT | Mod: PO

## 2017-04-19 PROCEDURE — 36415 COLL VENOUS BLD VENIPUNCTURE: CPT | Mod: PO

## 2017-04-19 RX ORDER — CITALOPRAM 20 MG/1
20 TABLET, FILM COATED ORAL DAILY
Qty: 30 TABLET | Refills: 2 | Status: SHIPPED | OUTPATIENT
Start: 2017-04-19 | End: 2019-02-21 | Stop reason: SDUPTHER

## 2017-04-19 RX ORDER — ALPRAZOLAM 0.5 MG/1
0.5 TABLET ORAL 2 TIMES DAILY PRN
Qty: 60 TABLET | Refills: 0 | Status: SHIPPED | OUTPATIENT
Start: 2017-04-19 | End: 2017-06-09 | Stop reason: SDUPTHER

## 2017-04-19 RX ORDER — LETROZOLE 2.5 MG/1
2.5 TABLET, FILM COATED ORAL DAILY
Qty: 30 TABLET | Refills: 2 | Status: SHIPPED | OUTPATIENT
Start: 2017-04-19 | End: 2017-08-21 | Stop reason: SDUPTHER

## 2017-04-19 RX ADMIN — DENOSUMAB 120 MG: 120 INJECTION SUBCUTANEOUS at 02:04

## 2017-04-19 NOTE — NURSING
Xgeva injection given without difficulties. Bandaid applied. Patient instructed to stay in the clinic for 15 minutes. Patient verbalized understanding and will notify nurse with any complaints.

## 2017-04-19 NOTE — MR AVS SNAPSHOT
Patient Information     Patient Name Sex Josey Cates Female 1970      Visit Information        Provider Department Dept Phone Center    2017 3:00 PM [facility] Chemo Infusion [facility] Chemotherapy Infusion [phone] [facility]      Patient Instructions      [facility]  [address] [facility] [address] [facility] [address]  [address] [address]  [phone] phone     [phone] fax  Hours of Operation: Monday- Friday 8:00am- 5:00pm  After hours phone  [phone]  Hematology / Oncology Physicians on call      Dr. Jake Pompa    Please call with any concerns regarding your appointment today.    Remember to take your calcium with vitamin D supplement as directed.   Do not have any dental work for 3 months following your injection today.       Your Current Medications Are     albuterol 90 mcg/actuation inhaler    alprazolam (XANAX) 0.5 MG tablet    citalopram (CELEXA) 20 MG tablet    ergocalciferol (VITAMIN D2) 50,000 unit Cap    hydrocodone-acetaminophen 7.5-325mg (NORCO) 7.5-325 mg per tablet    letrozole (FEMARA) 2.5 mg Tab    multivitamin (THERAGRAN) per tablet    ondansetron (ZOFRAN-ODT) 4 MG TbDL    palbociclib (IBRANCE) 125 mg Cap    alprazolam (XANAX) 0.5 MG tablet (Discontinued)    citalopram (CELEXA) 20 MG tablet (Discontinued)    letrozole (FEMARA) 2.5 mg Tab (Discontinued)      Facility-Administered Medications     denosumab (XGEVA) solution 120 mg      Appointments for Next Year     2017  3:00 PM INFUSION 015 MIN (15 min.) [facility] CHAIR 05 [facility]    Arrive at check-in approximately 15 minutes before your scheduled appointment time. Bring all outside medical records and imaging, along with a list of your current medications and insurance card.    2017  7:40 AM NON FASTING LAB (5 min.) [facility] LABORATORY [facility]    Arrive at check-in approximately 15 minutes before your scheduled appointment time.  "Bring all outside medical records and imaging, along with a list of your current medications and insurance card.    (off [facility]) 2nd floor    5/17/2017  8:15 AM PET CT  (30 min.) [facility] PETCT1 LIMIT 400 LBS    You have been scheduled for a PET scan. Do not eat or drink anything (except non-flavored  water) 6 hours prior to your appointment. After you receive an intravenous injection of a PET tracer, you will sit in a quiet area for one hour. This gives time for the tracer to circulate. The actual scan take approximately 45 minutes.    (off [address]) 2nd floor    5/17/2017 10:20 AM ESTABLISHED PATIENT (20 min.) [facility] Hemotology Oncology Richard Delong MD    Arrive at check-in approximately 15 minutes before your scheduled appointment time. Bring all outside medical records and imaging, along with a list of your current medications and insurance card.    (off [facility]) 3rd Floor    5/17/2017 10:45 AM INFUSION 015 MIN (15 min.) [facility] CHAIR 03 [facility]    Arrive at check-in approximately 15 minutes before your scheduled appointment time. Bring all outside medical records and imaging, along with a list of your current medications and insurance card.    7/19/2017  2:30 PM ESTABLISHED PATIENT (15 min.) [facility] Erika Chan MD    Arrive at check-in approximately 15 minutes before your scheduled appointment time. Bring all outside medical records and imaging, along with a list of your current medications and insurance card.    (off [facility]) 3rd floor         Default Flowsheet Data (last 24 hours)      Amb Complex Vitals Ankit        04/19/17 1439 04/19/17 1413             Measurements    Weight  98.2 kg (216 lb 7.9 oz)       Height  5' 7" (1.702 m)       BSA (Calculated - sq m)  2.15 sq meters       BMI (Calculated)  34       BP  122/68       Temp  98 °F (36.7 °C)       Pulse  89       Resp  18       SpO2  97 %       Pain Assessment    " Pain Score Zero Zero               Allergies     No Known Allergies      Medications You Received from 04/18/2017 6948 to 04/19/2017 1452        Date/Time Order Dose Route Action     04/19/2017 1458 denosumab (XGEVA) solution 120 mg 120 mg Subcutaneous Given      Current Discharge Medication List     Cannot display discharge medications since this is not an admission.

## 2017-04-19 NOTE — PATIENT INSTRUCTIONS
[facility]  [address]  [address]  [phone] phone     [phone] fax  Hours of Operation: Monday- Friday 8:00am- 5:00pm  After hours phone  [phone]  Hematology / Oncology Physicians on call      Dr. Jake Pompa    Please call with any concerns regarding your appointment today.    Remember to take your calcium with vitamin D supplement as directed.   Do not have any dental work for 3 months following your injection today.

## 2017-04-19 NOTE — PROGRESS NOTES
Oncology Social Work New Patient Assessment     Demographic Information   Name:Josey Flores  MRN: [id number]  SSN:    : 1970 Age: 46 y.o. Sex: female   Race: White Marital status:             Home address: [address] [address]  Mailing address: same  Patient Contact Information:   [phone] (home)    [email or web address]  Emergency Contact Information/Next of kin:  Extended Emergency Contact Information  Primary Emergency Contact: Alessio Deluna   [place]  Mobile Phone: None currently  Relation: Significant other  Additional contacts: David Acevedo (sister) [phone]    Medical Information   PCP: Aileen Sadler MD Primary oncologist: Baljeet     Patient Active Problem List   Diagnosis    Post-menopausal bleeding    Malignant neoplasm of overlapping sites of right female breast    Anxiety    Malignant neoplasm metastatic to lymph node of axilla    Secondary cancer of bone     Date of cancer diagnosis: 2017 Treatment start date: 2017; first injection was today 2017   New Diagnosis (y/n): yes Previous diagnosis/treatment: Ibrance (still current)   Current treatment meds: Xgeva Treatment frequency: oral capsule once daily at home; injection in clinic once per month   Radiation Treatment: N/A Radiation Provider: N/A   Surgery: Cancelled February as oncologist wanted to try the oral treatment first; pt reported it has worked excellence and does not need surgery at this time      Preferred Pharmacy:   [facility] [address] [place] [address]  Phone: [phone] Fax: [phone]       Insurance/Benefits Information   Primary Insurance: Medicaid ([employer]) Secondary Insurance: Medicare pending   Policy #: [id number] Policy #: N/A   Group #:  Group #: N/A   Subscriber: pt Subscriber: N/A       Prescription Coverage: Medicaid [employer] Additional Policies/Benefits: None   RxID  #:  Disability Insurance:    RxBIN #:  Cancer policy:    RxGrp #:  [place] benefits:    RxPCN #:  Long Term Care Benefits:     Other:      Employment/Income/Financial Assistance Information   Employment status: Employed part-time Employer/job: Works as a  for [employer]   Sources of income: Employment, SSI pending Financial concerns: None   Available assistance: CS, ACS, Cancer Care Met with financial services: No, not needed   Medicaid screening: already has Medicaid      Psychosocial History   Mental status: alert, oriented x3 Education level: Not addressed   Work History: Worked at current job for 2 years Family involvement: Pt's chi and sister were with her today   Living Arrangements: Lives with her chi and 27 year old son Rent/Mortgage: Not addressed   Transportation Concerns: None Nutrition/weight loss concerns: Yes, pt receives Boost Plus from Cancer Services already. Chi explained she really needs two cases a month because she drinks two a day. SW referred them to  [employer] where they can purchase a case for $10   HH: Not needed HME: None; not needed   ADLs: Independently Literate (y/n): yes   Primary language: English Need : No   Psychiatric history: history if anxiety according to her chart Legal concerns: None, we discussed Advance Directives. SW provided forms she can fill out and notarize and return to [facility] to have scanned in her file. Pt's sister encouraged her to complete and return.   Sources of support: Family, friends, neighbors, colleagues Emotional concerns: None   Hoahaoism/spiritual endorsement/concerns: None Zoroastrianism/Spirituality: Oriental orthodox   Understanding of diagnosis: Good Treatment expectations: To keep doing well   Main Concern(s): None Communication style: Open, friendly, positive attitude   Distress Screening Score: 9 Date given: 2/27/2017     Additional history:   Social History     Social History    Marital status:       Spouse name: N/A    Number of children: N/A    Years of education: N/A     Social History Main Topics    Smoking status: Current Every Day Smoker     Packs/day: 1.00     Types: Cigarettes    Smokeless tobacco: Never Used      Comment: no smoking after mn prior to surgery    Alcohol use No    Drug use: No    Sexual activity: Yes     Partners: Male     Other Topics Concern    Not on file     Social History Narrative       Referrals  Cancer Services: Deferred as pt already receives services [employer]: Declined Financial Services: Declined   Medicaid Screening/Application: Not needed Hem/Onc/Psych: Declined Support Groups: Informed, but did not seem interested   Counseling/other supportive care needs: None       Additional comments/notes: Met with pt, her fiance, and her sister after her injection today at [facility]. Pt said today was a good day. Pt did not need or have interest in any services as she is doing well and already receiving Boost Plus drinks through CS. Pt was notified of the reduced price of Boost Plus at [facility] and thanked SW for the information. Pt was provided with  contact info and advised to contact us should she have any concerns in the future.     Plan: DAYANNA will meet with pt as needed.

## 2017-04-19 NOTE — PROGRESS NOTES
Reason for visit: Right breast carcinoma  Date of Diagnosis: January 12, 2017    HPI:   The patient is a 46-year-old  female who presents to the hematology oncology clinic today to discuss further evaluation and management recommendations for metastatic right breast carcinoma.    I have reviewed all of the patient's relevant clinical history available in the medical record and have utilized this in my evaluation and management recommendations today.  The patient had 2 separate areas in the right breast that were biopsied area the first was a right breast mass at 11:00 which was 3 cm from the nipple which showed invasive mammary carcinoma.  The invasive carcinoma measured at least 1.2 cm in greatest dimension and appeared high-grade.  This tumor was ER positive/MS positive/HER-2 negative.  The second breast mass was biopsied from the retroperitoneal area lower area and was also positive for invasive mammary carcinoma.  This measured at least 0.4 cm and also appeared high-grade.  The tumor is morphologically very similar to the tumor in the prior specimen.  Receptor studies were not done on this specimen.  The patient also had a palpable right axillary lymph node which was biopsied and showed metastatic mammary carcinoma which measured at least 1.3 cm in greatest dimension.  The patient had self palpated this breast mass.  In addition the patient has a palpable mass in the region of her right elbow which she reports has been present for 9 years but had been slowly growing especially over the past year or 2.  She denies any pain associated with this.  She denies any fevers, chills or night sweats.  She denies any melena, hematochezia, hematemesis, hemoptysis or hematuria.  She denies any bowel or urinary complaints.  She denies any nausea, vomiting or abdominal pain. She reports chronic cough.  She is on cycle 4 of ibrance and letrozole. Ibrance was started on 2/21/17.    PAST MEDICAL HISTORY:   1. HSIL on pap  smear s/p LEEP    SURGICAL HISTORY:   1.  Tonsillectomy and adenoidectomy  2.  Appendectomy  3.  Cholecystectomy  4.  D&C    FAMILY HISTORY: She reports that her maternal aunt had lung cancer in her 70s.  She used to smoke cigarettes.  Her maternal cousin had lung cancer in her 60s.  She used to smoke cigarettes.  She denies any other immediate family members with cancer or bleeding/clotting disorders.    SOCIAL HISTORY: She reports a 45+ pack year smoking history and currently smokes one and a half packs of cigarettes a day.  She drinks wine occasionally.  She denies any history of recreational drug use.  She is engaged and has 2 sons.  She works for the [employer].  She lives in [place], [place].     ALLERGIES: [NKDA]    MEDICATIONS: [Medcard has been reviewed and/or reconciled.]    REVIEW OF SYSTEMS:   GENERAL: [No fevers, chills or sweats. No fatigue, weight loss or loss of appetite.]  HEENT: [No blurred vision, tinnitus, nasal discharge, sorethroat or dysphagia.]  HEART: [No chest pain, palpitations or shortness of breath.]    LUNGS: [Reports chronic cough. Denies hemoptysis or breathing problems.]  ABDOMEN: [No abdominal pain, nausea, vomiting, diarrhea, constipation or melena.]  GENITOURINARY: [No dysuria, bleeding or malodorous discharge.]  NEURO: [No headache, dizziness or vertigo.]  HEMATOLOGY: [No easy bruising, spontaneous bleeding or blood clots in the past].  MUSCULOSKELETAL: [No arthralgias, myalgias or bone pains.]  SKIN: [No rashes or skin lesions.]  PSYCHIATRY: [No depression. Reports anxiety.]  She denies any suicidal or homicidal ideation.    PHYSICAL EXAMINATION:   VS: Reviewed on nurse's notes.  APPEARANCE: The patient is a well-developed, well-nourished and well-groomed  female who appears in no acute distress. She was accompanied to this clinic visit by her fiance.  HEENT: No scleral icterus. Both external auditory canals clear. No oral ulcers, lesions. Throat clear  HEAD: No  sinus tenderness.  NECK: Supple. No palpable lymphadenopathy. Thyroid non-tender, no palpable masses  CHEST: Breath sounds clear bilaterally. No rales. No rhonchi. Unlabored respirations.  BREAST: Previously palpable breast mass at the 11:00 position in the right breast is no longer distinctly palpable. No palpable masses in the left breast. No evidence of nipple discharge bilaterally  CARDIOVASCULAR: Normal S1, S2. Normal rate. Regular rhythm.  ABDOMEN: Bowel sounds normal. No tenderness. No abdominal distention. No hepatomegaly. No splenomegaly.  LYMPHATIC: No palpable supraclavicular. There is a palpable right axillary lymph node. Small hematoma from recent biopsy also noted.   EXTREMITIES: No clubbing, cyanosis, edema. Palpable firm 3 cm mass in the right elbow is noted.  Bilateral axillary hydradenitis is noted without active infection.  SKIN: No lesions. No petechiae. No ecchymoses. No induration or nodules  NEUROLOGIC: No focal findings. Alert & Oriented x 3. Mood appropriate to affect      LABS:   Reviewed    IMAGING:  Reviewed    IMPRESSION:  1.  Metastatic multifocal infiltrating ductal carcinoma of the right breast with right axillary and bone involvement [ER positive/NE positive/HER-2 negative]  2.  FDG avid right thyroid nodule  3.  Tobacco abuse  4.  Right elbow mass  5.  Anxiety and depression  6.  Chronic cough    PLAN:  1.  I had a detailed discussion with the patient and her family members today with regard to the diagnosis, prognosis and treatment options for metastatic multifocal invasive ductal carcinoma of the right breast with right axillary and bone involvement.  2.  I have discussed that at this time her metastatic malignancy is potentially treatable but not curable.  3.  Results of PET/CT scan from January 23, 2017 were discussed in detail with the patient and her family members today. There are 3 osseous metastatic lesions one involving the manubrium and 2 involving the posterior left  ilium most consistent with metastatic disease. She also has FDG avid right thyroid nodule which is highly suspicious for possible malignancy as well.    4.  The patient was strongly encouraged to quit smoking.  She expressed understanding.  Prescription for Xanax when necessary anxiety was previously given to the patient after full discussion of sedation precautions.    5. FSH and estradiol levels confirm menopausal status. TSH lab was normal. She will follow up with her gynecologist Dr. Chan with regard to abnormal Pap smear as scheduled.  6.  MRI of the brain on 1/31/17 to evaluate for any evidence of metastatic disease to complete staging workup was negative for metastatic malignancy to the brain. CT head done in March 2017 in ED was also ok.  7. Continue to proceed with cycle 4 of ibrance/letrozole.  She appears to be tolerating this well so far with no significant side effects. She will continue on letrozole at this time.   8. Her medical insurance denied genetic testing at [facility] with regard to her breast cancer. I will look at alternative lab options where this might be covered.  9. She has been advised to take calcium and vit d supplementation everyday. She will be scheduled to start xgeva injections today for treatment of bone involvement by malignancy. Risks/benefits and common side effects discussed and she expressed understanding and agreement. She has dentures.  10. Pulmonary consultation for evaluation and management recommendations for chronic cough. This is likely related to history of tobacco abuse and COPD which will need long term management.    Return to clinic in 4 weeks with labs, PET/CT to discuss further management.  She knows to call sooner for any new problems or questions.    Richard Delong MD

## 2017-04-26 DIAGNOSIS — C79.51 SECONDARY CANCER OF BONE: ICD-10-CM

## 2017-04-26 DIAGNOSIS — C77.3 MALIGNANT NEOPLASM METASTATIC TO LYMPH NODE OF AXILLA: ICD-10-CM

## 2017-04-26 DIAGNOSIS — C50.811 MALIGNANT NEOPLASM OF OVERLAPPING SITES OF RIGHT FEMALE BREAST: ICD-10-CM

## 2017-04-26 RX ORDER — HYDROCODONE BITARTRATE AND ACETAMINOPHEN 7.5; 325 MG/1; MG/1
1 TABLET ORAL EVERY 6 HOURS PRN
Qty: 60 TABLET | Refills: 0 | Status: SHIPPED | OUTPATIENT
Start: 2017-04-26 | End: 2017-06-09 | Stop reason: SDUPTHER

## 2017-05-05 ENCOUNTER — TELEPHONE (OUTPATIENT)
Dept: PULMONOLOGY | Facility: CLINIC | Age: 47
End: 2017-05-05

## 2017-05-05 DIAGNOSIS — R06.02 SOB (SHORTNESS OF BREATH): Primary | ICD-10-CM

## 2017-05-12 ENCOUNTER — TELEPHONE (OUTPATIENT)
Dept: RADIOLOGY | Facility: HOSPITAL | Age: 47
End: 2017-05-12

## 2017-05-15 DIAGNOSIS — C50.811 MALIGNANT NEOPLASM OF OVERLAPPING SITES OF RIGHT FEMALE BREAST: ICD-10-CM

## 2017-05-15 DIAGNOSIS — C77.3 MALIGNANT NEOPLASM METASTATIC TO LYMPH NODE OF AXILLA: ICD-10-CM

## 2017-05-15 DIAGNOSIS — C79.51 SECONDARY CANCER OF BONE: ICD-10-CM

## 2017-05-15 DIAGNOSIS — G89.3 NEOPLASM RELATED PAIN: ICD-10-CM

## 2017-05-15 RX ORDER — PALBOCICLIB 125 MG/1
CAPSULE ORAL
Qty: 21 CAPSULE | Refills: 2 | Status: SHIPPED | OUTPATIENT
Start: 2017-05-15 | End: 2017-08-22 | Stop reason: SDUPTHER

## 2017-05-15 NOTE — TELEPHONE ENCOUNTER
----- Message from Elena Street sent at 5/15/2017 12:25 PM CDT -----  Contact: pt  Pt state she is having a side effect to her medication and wants to be advised, pt can be reached at [phone]///[id number]

## 2017-05-16 ENCOUNTER — OFFICE VISIT (OUTPATIENT)
Dept: HEMATOLOGY/ONCOLOGY | Facility: CLINIC | Age: 47
DRG: 571 | End: 2017-05-16
Payer: MEDICAID

## 2017-05-16 ENCOUNTER — HOSPITAL ENCOUNTER (INPATIENT)
Facility: HOSPITAL | Age: 47
LOS: 9 days | Discharge: NURSING, REHAB OR LONG-TERM CARE | DRG: 571 | End: 2017-05-25
Attending: EMERGENCY MEDICINE | Admitting: INTERNAL MEDICINE
Payer: MEDICAID

## 2017-05-16 VITALS
OXYGEN SATURATION: 98 % | HEART RATE: 63 BPM | TEMPERATURE: 98 F | DIASTOLIC BLOOD PRESSURE: 67 MMHG | BODY MASS INDEX: 33.6 KG/M2 | SYSTOLIC BLOOD PRESSURE: 106 MMHG | WEIGHT: 214.5 LBS

## 2017-05-16 DIAGNOSIS — L02.411 ABSCESS OF AXILLA, RIGHT: ICD-10-CM

## 2017-05-16 DIAGNOSIS — L73.2 HIDRADENITIS SUPPURATIVA OF LEFT AXILLA: ICD-10-CM

## 2017-05-16 DIAGNOSIS — C79.51 SECONDARY CANCER OF BONE: Primary | ICD-10-CM

## 2017-05-16 DIAGNOSIS — C77.3 MALIGNANT NEOPLASM METASTATIC TO LYMPH NODE OF AXILLA: ICD-10-CM

## 2017-05-16 DIAGNOSIS — L73.2 SUPPURATIVE HIDRADENITIS: ICD-10-CM

## 2017-05-16 DIAGNOSIS — L02.214 ABSCESS OF LEFT GROIN: ICD-10-CM

## 2017-05-16 DIAGNOSIS — C50.811 MALIGNANT NEOPLASM OF OVERLAPPING SITES OF RIGHT FEMALE BREAST: Primary | ICD-10-CM

## 2017-05-16 DIAGNOSIS — T14.8XXA OPEN WOUND: ICD-10-CM

## 2017-05-16 DIAGNOSIS — L73.2 HIDRADENITIS: ICD-10-CM

## 2017-05-16 DIAGNOSIS — C50.811 MALIGNANT NEOPLASM OF OVERLAPPING SITES OF RIGHT FEMALE BREAST: ICD-10-CM

## 2017-05-16 DIAGNOSIS — L73.2 HYDRADENITIS: ICD-10-CM

## 2017-05-16 DIAGNOSIS — L02.411 ABSCESS OF RIGHT AXILLA: ICD-10-CM

## 2017-05-16 DIAGNOSIS — C79.51 SECONDARY CANCER OF BONE: ICD-10-CM

## 2017-05-16 DIAGNOSIS — L03.111 CELLULITIS OF AXILLA, RIGHT: ICD-10-CM

## 2017-05-16 DIAGNOSIS — L02.416 ABSCESS OF LEFT LEG: ICD-10-CM

## 2017-05-16 LAB
ALBUMIN SERPL BCP-MCNC: 3.7 G/DL
ALP SERPL-CCNC: 84 U/L
ALT SERPL W/O P-5'-P-CCNC: 13 U/L
ANION GAP SERPL CALC-SCNC: 9 MMOL/L
APTT BLDCRRT: 28.6 SEC
AST SERPL-CCNC: 15 U/L
BASOPHILS # BLD AUTO: 0.05 K/UL
BASOPHILS NFR BLD: 0.6 %
BILIRUB SERPL-MCNC: 0.2 MG/DL
BUN SERPL-MCNC: 10 MG/DL
CALCIUM SERPL-MCNC: 9.3 MG/DL
CHLORIDE SERPL-SCNC: 105 MMOL/L
CO2 SERPL-SCNC: 24 MMOL/L
CREAT SERPL-MCNC: 1 MG/DL
DIFFERENTIAL METHOD: ABNORMAL
EOSINOPHIL # BLD AUTO: 0.1 K/UL
EOSINOPHIL NFR BLD: 1 %
ERYTHROCYTE [DISTWIDTH] IN BLOOD BY AUTOMATED COUNT: 15.7 %
EST. GFR  (AFRICAN AMERICAN): >60 ML/MIN/1.73 M^2
EST. GFR  (NON AFRICAN AMERICAN): >60 ML/MIN/1.73 M^2
GLUCOSE SERPL-MCNC: 104 MG/DL
HCT VFR BLD AUTO: 36.1 %
HGB BLD-MCNC: 12.8 G/DL
INR PPP: 1
LACTATE SERPL-SCNC: 1.1 MMOL/L
LYMPHOCYTES # BLD AUTO: 3.1 K/UL
LYMPHOCYTES NFR BLD: 37.7 %
MCH RBC QN AUTO: 36 PG
MCHC RBC AUTO-ENTMCNC: 35.5 %
MCV RBC AUTO: 101 FL
MONOCYTES # BLD AUTO: 1.1 K/UL
MONOCYTES NFR BLD: 13.3 %
NEUTROPHILS # BLD AUTO: 3.9 K/UL
NEUTROPHILS NFR BLD: 47.4 %
PLATELET # BLD AUTO: 241 K/UL
PMV BLD AUTO: 10.4 FL
POTASSIUM SERPL-SCNC: 4 MMOL/L
PROT SERPL-MCNC: 7.4 G/DL
PROTHROMBIN TIME: 10 SEC
RBC # BLD AUTO: 3.56 M/UL
SODIUM SERPL-SCNC: 138 MMOL/L
WBC # BLD AUTO: 8.3 K/UL

## 2017-05-16 PROCEDURE — 99214 OFFICE O/P EST MOD 30 MIN: CPT | Mod: S$PBB,,, | Performed by: INTERNAL MEDICINE

## 2017-05-16 PROCEDURE — 96366 THER/PROPH/DIAG IV INF ADDON: CPT

## 2017-05-16 PROCEDURE — 87040 BLOOD CULTURE FOR BACTERIA: CPT | Mod: 59

## 2017-05-16 PROCEDURE — 99285 EMERGENCY DEPT VISIT HI MDM: CPT | Mod: 25,27

## 2017-05-16 PROCEDURE — 96365 THER/PROPH/DIAG IV INF INIT: CPT

## 2017-05-16 PROCEDURE — 96375 TX/PRO/DX INJ NEW DRUG ADDON: CPT

## 2017-05-16 PROCEDURE — 85730 THROMBOPLASTIN TIME PARTIAL: CPT

## 2017-05-16 PROCEDURE — 63600175 PHARM REV CODE 636 W HCPCS: Performed by: EMERGENCY MEDICINE

## 2017-05-16 PROCEDURE — 07B50ZZ EXCISION OF RIGHT AXILLARY LYMPHATIC, OPEN APPROACH: ICD-10-PCS | Performed by: INTERNAL MEDICINE

## 2017-05-16 PROCEDURE — 21400001 HC TELEMETRY ROOM

## 2017-05-16 PROCEDURE — 63600175 PHARM REV CODE 636 W HCPCS: Performed by: INTERNAL MEDICINE

## 2017-05-16 PROCEDURE — 83605 ASSAY OF LACTIC ACID: CPT

## 2017-05-16 PROCEDURE — 25000003 PHARM REV CODE 250: Performed by: EMERGENCY MEDICINE

## 2017-05-16 PROCEDURE — 85610 PROTHROMBIN TIME: CPT

## 2017-05-16 PROCEDURE — 99999 PR PBB SHADOW E&M-EST. PATIENT-LVL III: CPT | Mod: PBBFAC,,, | Performed by: INTERNAL MEDICINE

## 2017-05-16 PROCEDURE — 80053 COMPREHEN METABOLIC PANEL: CPT

## 2017-05-16 PROCEDURE — 11000001 HC ACUTE MED/SURG PRIVATE ROOM

## 2017-05-16 PROCEDURE — 0JBC0ZZ EXCISION OF PELVIC REGION SUBCUTANEOUS TISSUE AND FASCIA, OPEN APPROACH: ICD-10-PCS | Performed by: INTERNAL MEDICINE

## 2017-05-16 PROCEDURE — 85025 COMPLETE CBC W/AUTO DIFF WBC: CPT

## 2017-05-16 PROCEDURE — 07B60ZZ EXCISION OF LEFT AXILLARY LYMPHATIC, OPEN APPROACH: ICD-10-PCS | Performed by: INTERNAL MEDICINE

## 2017-05-16 PROCEDURE — 25000003 PHARM REV CODE 250: Performed by: INTERNAL MEDICINE

## 2017-05-16 RX ORDER — GLUCAGON 1 MG
1 KIT INJECTION
Status: DISCONTINUED | OUTPATIENT
Start: 2017-05-16 | End: 2017-05-25 | Stop reason: HOSPADM

## 2017-05-16 RX ORDER — CEFEPIME HYDROCHLORIDE 1 G/50ML
1 INJECTION, SOLUTION INTRAVENOUS
Status: COMPLETED | OUTPATIENT
Start: 2017-05-16 | End: 2017-05-16

## 2017-05-16 RX ORDER — ONDANSETRON 2 MG/ML
4 INJECTION INTRAMUSCULAR; INTRAVENOUS EVERY 12 HOURS PRN
Status: DISCONTINUED | OUTPATIENT
Start: 2017-05-16 | End: 2017-05-25 | Stop reason: HOSPADM

## 2017-05-16 RX ORDER — MORPHINE SULFATE 2 MG/ML
2 INJECTION, SOLUTION INTRAMUSCULAR; INTRAVENOUS EVERY 4 HOURS PRN
Status: DISCONTINUED | OUTPATIENT
Start: 2017-05-16 | End: 2017-05-16

## 2017-05-16 RX ORDER — IBUPROFEN 200 MG
16 TABLET ORAL
Status: DISCONTINUED | OUTPATIENT
Start: 2017-05-16 | End: 2017-05-25 | Stop reason: HOSPADM

## 2017-05-16 RX ORDER — HYDROMORPHONE HYDROCHLORIDE 2 MG/ML
1 INJECTION, SOLUTION INTRAMUSCULAR; INTRAVENOUS; SUBCUTANEOUS ONCE
Status: DISCONTINUED | OUTPATIENT
Start: 2017-05-16 | End: 2017-05-16

## 2017-05-16 RX ORDER — LETROZOLE 2.5 MG/1
2.5 TABLET, FILM COATED ORAL DAILY
Status: DISCONTINUED | OUTPATIENT
Start: 2017-05-17 | End: 2017-05-16

## 2017-05-16 RX ORDER — ONDANSETRON 2 MG/ML
4 INJECTION INTRAMUSCULAR; INTRAVENOUS EVERY 12 HOURS PRN
Status: DISCONTINUED | OUTPATIENT
Start: 2017-05-16 | End: 2017-05-16

## 2017-05-16 RX ORDER — ALBUTEROL SULFATE 2.5 MG/.5ML
2.5 SOLUTION RESPIRATORY (INHALATION) EVERY 6 HOURS PRN
Status: DISCONTINUED | OUTPATIENT
Start: 2017-05-16 | End: 2017-05-25 | Stop reason: HOSPADM

## 2017-05-16 RX ORDER — ONDANSETRON 2 MG/ML
4 INJECTION INTRAMUSCULAR; INTRAVENOUS
Status: COMPLETED | OUTPATIENT
Start: 2017-05-16 | End: 2017-05-16

## 2017-05-16 RX ORDER — IBUPROFEN 200 MG
24 TABLET ORAL
Status: DISCONTINUED | OUTPATIENT
Start: 2017-05-16 | End: 2017-05-25 | Stop reason: HOSPADM

## 2017-05-16 RX ORDER — ALBUTEROL SULFATE 90 UG/1
2 AEROSOL, METERED RESPIRATORY (INHALATION) EVERY 6 HOURS PRN
Status: DISCONTINUED | OUTPATIENT
Start: 2017-05-16 | End: 2017-05-16

## 2017-05-16 RX ORDER — MORPHINE SULFATE 4 MG/ML
4 INJECTION, SOLUTION INTRAMUSCULAR; INTRAVENOUS EVERY 4 HOURS PRN
Status: DISCONTINUED | OUTPATIENT
Start: 2017-05-16 | End: 2017-05-16

## 2017-05-16 RX ORDER — GUAIFENESIN/DEXTROMETHORPHAN 100-10MG/5
10 SYRUP ORAL EVERY 6 HOURS PRN
Status: DISCONTINUED | OUTPATIENT
Start: 2017-05-16 | End: 2017-05-25 | Stop reason: HOSPADM

## 2017-05-16 RX ORDER — SODIUM CHLORIDE 9 MG/ML
INJECTION, SOLUTION INTRAVENOUS ONCE
Status: COMPLETED | OUTPATIENT
Start: 2017-05-17 | End: 2017-05-17

## 2017-05-16 RX ORDER — ALPRAZOLAM 0.5 MG/1
0.5 TABLET ORAL 2 TIMES DAILY PRN
Status: DISCONTINUED | OUTPATIENT
Start: 2017-05-16 | End: 2017-05-25 | Stop reason: HOSPADM

## 2017-05-16 RX ORDER — PANTOPRAZOLE SODIUM 40 MG/1
40 TABLET, DELAYED RELEASE ORAL DAILY
Status: DISCONTINUED | OUTPATIENT
Start: 2017-05-17 | End: 2017-05-25 | Stop reason: HOSPADM

## 2017-05-16 RX ORDER — DIPHENHYDRAMINE HYDROCHLORIDE 50 MG/ML
12.5 INJECTION INTRAMUSCULAR; INTRAVENOUS EVERY 6 HOURS PRN
Status: DISCONTINUED | OUTPATIENT
Start: 2017-05-16 | End: 2017-05-25 | Stop reason: HOSPADM

## 2017-05-16 RX ORDER — CITALOPRAM 20 MG/1
20 TABLET, FILM COATED ORAL DAILY
Status: DISCONTINUED | OUTPATIENT
Start: 2017-05-17 | End: 2017-05-25 | Stop reason: HOSPADM

## 2017-05-16 RX ORDER — HYDROMORPHONE HYDROCHLORIDE 2 MG/ML
1 INJECTION, SOLUTION INTRAMUSCULAR; INTRAVENOUS; SUBCUTANEOUS ONCE
Status: COMPLETED | OUTPATIENT
Start: 2017-05-16 | End: 2017-05-16

## 2017-05-16 RX ORDER — ERGOCALCIFEROL 1.25 MG/1
50000 CAPSULE ORAL
Status: DISCONTINUED | OUTPATIENT
Start: 2017-05-23 | End: 2017-05-25 | Stop reason: HOSPADM

## 2017-05-16 RX ORDER — METOCLOPRAMIDE HYDROCHLORIDE 5 MG/ML
5 INJECTION INTRAMUSCULAR; INTRAVENOUS EVERY 6 HOURS PRN
Status: DISCONTINUED | OUTPATIENT
Start: 2017-05-16 | End: 2017-05-25 | Stop reason: HOSPADM

## 2017-05-16 RX ORDER — DIPHENHYDRAMINE HYDROCHLORIDE 50 MG/ML
25 INJECTION INTRAMUSCULAR; INTRAVENOUS EVERY 6 HOURS PRN
Status: DISCONTINUED | OUTPATIENT
Start: 2017-05-16 | End: 2017-05-16

## 2017-05-16 RX ORDER — MORPHINE SULFATE 4 MG/ML
4 INJECTION, SOLUTION INTRAMUSCULAR; INTRAVENOUS EVERY 6 HOURS PRN
Status: DISCONTINUED | OUTPATIENT
Start: 2017-05-16 | End: 2017-05-17

## 2017-05-16 RX ORDER — ONDANSETRON 8 MG/1
8 TABLET, ORALLY DISINTEGRATING ORAL EVERY 8 HOURS PRN
Status: DISCONTINUED | OUTPATIENT
Start: 2017-05-16 | End: 2017-05-25 | Stop reason: HOSPADM

## 2017-05-16 RX ADMIN — GUAIFENESIN AND DEXTROMETHORPHAN 10 ML: 100; 10 SYRUP ORAL at 10:05

## 2017-05-16 RX ADMIN — HYDROMORPHONE HYDROCHLORIDE 1 MG: 2 INJECTION, SOLUTION INTRAMUSCULAR; INTRAVENOUS; SUBCUTANEOUS at 05:05

## 2017-05-16 RX ADMIN — ONDANSETRON 4 MG: 2 INJECTION INTRAMUSCULAR; INTRAVENOUS at 05:05

## 2017-05-16 RX ADMIN — VANCOMYCIN HYDROCHLORIDE 1500 MG: 10 INJECTION, POWDER, LYOPHILIZED, FOR SOLUTION INTRAVENOUS at 06:05

## 2017-05-16 RX ADMIN — DIPHENHYDRAMINE HYDROCHLORIDE 25 MG: 50 INJECTION, SOLUTION INTRAMUSCULAR; INTRAVENOUS at 08:05

## 2017-05-16 RX ADMIN — MORPHINE SULFATE 4 MG: 4 INJECTION, SOLUTION INTRAMUSCULAR; INTRAVENOUS at 10:05

## 2017-05-16 RX ADMIN — CEFEPIME HYDROCHLORIDE 1 G: 1 INJECTION, POWDER, FOR SOLUTION INTRAMUSCULAR; INTRAVENOUS at 05:05

## 2017-05-16 NOTE — PROGRESS NOTES
Reason for visit: Right breast carcinoma/Urgent care visit  Date of Diagnosis: January 12, 2017    HPI:   The patient is a 46-year-old  female who presents to the hematology oncology clinic today for an urgent care visit. She is followed for metastatic right breast carcinoma.    I have reviewed all of the patient's relevant clinical history available in the medical record and have utilized this in my evaluation and management recommendations today.  The patient had 2 separate areas in the right breast that were biopsied area the first was a right breast mass at 11:00 which was 3 cm from the nipple which showed invasive mammary carcinoma.  The invasive carcinoma measured at least 1.2 cm in greatest dimension and appeared high-grade.  This tumor was ER positive/OR positive/HER-2 negative.  The second breast mass was biopsied from the retroperitoneal area lower area and was also positive for invasive mammary carcinoma.  This measured at least 0.4 cm and also appeared high-grade.  The tumor is morphologically very similar to the tumor in the prior specimen.  Receptor studies were not done on this specimen.  The patient also had a palpable right axillary lymph node which was biopsied and showed metastatic mammary carcinoma which measured at least 1.3 cm in greatest dimension.  The patient had self palpated this breast mass.  In addition the patient has a palpable mass in the region of her right elbow which she reports has been present for 9 years but had been slowly growing especially over the past year or 2.  She denies any pain associated with this.    Today the patient reports that for the past 3-4 days she has been having increasing pain in her right armpit and left groin.  She reports redness and swelling associated with this pain.  She denies any fevers, chills or night sweats.  She denies any melena, hematochezia, hematemesis, hemoptysis or hematuria.  She denies any bowel or urinary complaints.  She  denies any nausea, vomiting or abdominal pain. She reports chronic cough.  She is scheduled to start her next cycle of ibrance and letrozole on 5/16/17. Ibrance was started on 2/21/17.    PAST MEDICAL HISTORY:   1. HSIL on pap smear s/p LEEP    SURGICAL HISTORY:   1.  Tonsillectomy and adenoidectomy  2.  Appendectomy  3.  Cholecystectomy  4.  D&C    FAMILY HISTORY: She reports that her maternal aunt had lung cancer in her 70s.  She used to smoke cigarettes.  Her maternal cousin had lung cancer in her 60s.  She used to smoke cigarettes.  She denies any other immediate family members with cancer or bleeding/clotting disorders.    SOCIAL HISTORY: She reports a 45+ pack year smoking history and currently smokes one and a half packs of cigarettes a day.  She drinks wine occasionally.  She denies any history of recreational drug use.  She is engaged and has 2 sons.  She works for the [employer].  She lives in [place], [place].     ALLERGIES: [NKDA]    MEDICATIONS: [Medcard has been reviewed and/or reconciled.]    REVIEW OF SYSTEMS:   GENERAL: [No fevers, chills or sweats. Reports fatigue. Reports weight gain. Denies loss of appetite.]  HEENT: [No blurred vision, tinnitus, nasal discharge, sorethroat or dysphagia.]  HEART: [No chest pain, palpitations or shortness of breath.]    LUNGS: [Reports chronic cough. Denies hemoptysis or breathing problems.]  ABDOMEN: [No abdominal pain, nausea, vomiting, diarrhea, constipation or melena.]  GENITOURINARY: [No dysuria, bleeding or malodorous discharge.]  NEURO: [No headache, dizziness or vertigo.]  HEMATOLOGY: [No easy bruising, spontaneous bleeding or blood clots in the past].  MUSCULOSKELETAL: [No arthralgias, myalgias or bone pains.]  SKIN: [No rashes or skin lesions.]  PSYCHIATRY: [No depression. Reports anxiety.]  She denies any suicidal or homicidal ideation.    PHYSICAL EXAMINATION:   VS: Reviewed on nurse's notes.  APPEARANCE: The patient is a well-developed,  well-nourished and well-groomed  female who appears in no acute distress. She was accompanied to this clinic visit by her fiance.  HEENT: No scleral icterus. Both external auditory canals clear. No oral ulcers, lesions. Throat clear  HEAD: No sinus tenderness.  NECK: Supple. No palpable lymphadenopathy. Thyroid non-tender, no palpable masses  CHEST: Breath sounds clear bilaterally. No rales. No rhonchi. Unlabored respirations.  BREAST: Previously palpable breast mass at the 11:00 position in the right breast is no longer distinctly palpable. No palpable masses in the left breast. No evidence of nipple discharge bilaterally  CARDIOVASCULAR: Normal S1, S2. Normal rate. Regular rhythm.  ABDOMEN: Bowel sounds normal. No tenderness. No abdominal distention. No hepatomegaly. No splenomegaly.  LYMPHATIC: No palpable supraclavicular lymphadenopathy. Bilateral axillary hydradenitis is noted with active infection in the right axilla.  There is erythema associated with edema/tenderness.  She also has edema/tenderness/erythema in the left groin.  There is associated induration.  EXTREMITIES: No clubbing, cyanosis, edema. Palpable firm 3 cm mass in the right elbow is noted.  Bilateral axillary hydradenitis is noted without active infection.  SKIN: No lesions. No petechiae. No ecchymoses.   NEUROLOGIC: No focal findings. Alert & Oriented x 3. Mood appropriate to affect      LABS:   Reviewed    IMAGING:  Reviewed    IMPRESSION:  1.  Metastatic multifocal infiltrating ductal carcinoma of the right breast with right axillary and bone involvement [ER positive/ID positive/HER-2 negative]  2.  FDG avid right thyroid nodule  3.  Tobacco abuse  4.  Right elbow mass  5.  Anxiety and depression  6.  Chronic cough  7.  Right axillary hydradenitis/cellulitis and left inguinal hydradenitis/cellulitis    PLAN:  1.  I had a detailed discussion with the patient previously with regard to the diagnosis, prognosis and treatment options for  metastatic multifocal invasive ductal carcinoma of the right breast with right axillary and bone involvement.  2.  I have discussed that at this time her metastatic malignancy is potentially treatable but not curable.  3.  Results of PET/CT scan from January 23, 2017 were discussed in detail with the patient and her family members today. There are 3 osseous metastatic lesions one involving the manubrium and 2 involving the posterior left ilium most consistent with metastatic disease. She also has FDG avid right thyroid nodule which is highly suspicious for possible malignancy as well.    4.  The patient was strongly encouraged to quit smoking.  She expressed understanding.  Prescription for Xanax when necessary anxiety was previously given to the patient after full discussion of sedation precautions.    5. FSH and estradiol levels confirm menopausal status. TSH lab was normal.   6.  MRI of the brain on 1/31/17 to evaluate for any evidence of metastatic disease to complete staging workup was negative for metastatic malignancy to the brain. CT head done in March 2017 in ED was also ok.  7. Continue to proceed with cycle 4 of ibrance/letrozole.  She appears to be tolerating this well so far with no significant side effects. She will continue on letrozole at this time.   8. Her medical insurance denied genetic testing at [facility] with regard to her breast cancer. I will look at alternative lab options where this might be covered.  9. She has been advised to take calcium and vit d supplementation everyday. She is on xgeva injections for treatment of bone involvement by malignancy. Risks/benefits and common side effects discussed and she expressed understanding and agreement. She has dentures.  10. Pulmonary consultation for evaluation and management recommendations for chronic cough. This is likely related to history of tobacco abuse and COPD which will need long term management.  7.  With regard to the patient's  current presenting complaints today I have recommended that she proceed to the emergency room at [facility], [place] as she will need treatment with intravenous antibiotics for management of right axillary hydradenitis/cellulitis and left inguinal hydradenitis/cellulitis.  This is unlikely to improve with oral antibiotic therapy.  She will likely also need evaluation by Gen. surgery for I&D as appropriate.  The patient will continue on letrozole at this time.  I have asked her to hold off on starting her next cycle with ibrance until resolution of acute infection.    Return to clinic will be determined after above. She knows to call for any new problems or questions.    Richard Delong MD

## 2017-05-16 NOTE — CONSULTS
Clinical Pharmacy Progress Note    Pharmacy consulted to dose vancomycin for  Dr Delong.     46 y.o. female with history of breast cancer admitted for cellulitis.     The patient has the following labs:     Date Creatinine (mg/dl)    BUN WBC Count   5/16/2017 Estimated Creatinine Clearance: 80.9 mL/min (based on Cr of 1). Lab Results   Component Value Date    BUN 10 05/16/2017     Lab Results   Component Value Date    WBC 8.30 05/16/2017        Ideal BW: 61.1  Actual BW: 93.9  Adjusted (Dosing) BW: 74    Tmax/24h 98  Cultures: blood culture pending    Vancomycin (day 1 of therapy)   Other anti-infectives: cefepime (day 1 of therapy)    Based on Estimated Creatinine Clearance: 80.9 mL/min (based on Cr of 1). and weight of 74, pharmacy will initiate vancomycin 1250 mg every 12 hours, with load dose of 1500mg and draw a trough prior to 4th dose. Trough due 5/18 at 05:00. Pharmacy will continue to follow patients clinical status, renal function, C&S, and adjust dose as necessary to maintain trough levels between 15 and 20.     Thank you for allowing us to participate in this patient's care.     Perla Cohen   5/16/2017 6:42 PM

## 2017-05-16 NOTE — ED PROVIDER NOTES
"SCRIBE #1 NOTE: I, Marco A Kumar (Khaiden), am scribing for, and in the presence of, Phoenix Parish Jr., MD. I have scribed the entire note.      History      Chief Complaint   Patient presents with    Cellulitis     sent from clinic for "infected sweat glands" for admit and IV abx.        Review of patient's allergies indicates:  No Known Allergies     HPI   HPI    5/16/2017, 5:01 PM   History obtained from the patient      History of Present Illness: Josey Flores is a 46 y.o. female patient who presents to the Emergency Department for further evaluation of cellulitis after being seen at the clinic today. Pt c/o pain in the R axilla and L groin secondary to cellulitis. Symptoms are constant and moderate in severity. No mitigating or exacerbating factors reported.No associated sxs reported. Patient denies any fever, chills, n/v, rash, wound, pallor, drainage, and all other sxs at this time. No prior Tx reported. No further complaints or concerns at this time.          Arrival mode: Personal vehicle    PCP: Aileen Sadler MD       Past Medical History:  Past Medical History:   Diagnosis Date    Cancer     metastatic-lymph nodes, bone, and thyroid        Past Surgical History:  Past Surgical History:   Procedure Laterality Date    ADENOIDECTOMY      APPENDECTOMY      CHOLECYSTECTOMY      TONSILLECTOMY           Family History:  History reviewed. No pertinent family history.    Social History:  Social History     Social History Main Topics    Smoking status: Current Every Day Smoker     Packs/day: 1.00     Types: Cigarettes    Smokeless tobacco: Never Used      Comment: no smoking after mn prior to surgery    Alcohol use No    Drug use: No    Sexual activity: Yes     Partners: Male       ROS   Review of Systems   Constitutional: Negative for chills and fever.   HENT: Negative for sore throat.    Respiratory: Negative for cough.    Gastrointestinal: Negative for nausea and vomiting.   Genitourinary: " "Negative for dysuria and flank pain.   Musculoskeletal: Negative for back pain.   Skin: Negative for pallor, rash and wound.        (+) Pain to R axilla and L groin, secondary to cellulitis       (-) drainage   Neurological: Negative for weakness.   Hematological: Does not bruise/bleed easily.   All other systems reviewed and are negative.      Physical Exam    Initial Vitals   BP Pulse Resp Temp SpO2   05/16/17 1525 05/16/17 1525 05/16/17 1525 05/16/17 1525 05/16/17 1525   129/66 89 18 97.8 °F (36.6 °C) 96 %      Physical Exam  Nursing Notes and Vital Signs Reviewed.  Constitutional: Patient is in no acute distress. Well-developed and well-nourished.  Head: Atraumatic. Normocephalic.  Eyes: PERRL. EOM intact. Conjunctivae are not pale. No scleral icterus.  ENT: Mucous membranes are moist. Oropharynx is clear and symmetric.    Neck: Supple. Full ROM. No lymphadenopathy.  Cardiovascular: Regular rate. Regular rhythm. No murmurs, rubs, or gallops. Distal pulses are 2+ and symmetric.  Pulmonary/Chest: No respiratory distress. Clear to auscultation bilaterally. No wheezing, rales, or rhonchi.  Abdominal: Soft and non-distended.  There is no tenderness.  No rebound, guarding, or rigidity. Good bowel sounds.  Musculoskeletal: Moves all extremities. No obvious deformities. No edema. No calf tenderness.  Skin: Warm and dry. 4x3cm area of swelling and induration to the R armpit. 2x3cm area of swelling to the L groin.   Neurological:  Alert, awake, and appropriate.  Normal speech.  No acute focal neurological deficits are appreciated.  Psychiatric: Normal affect. Good eye contact. Appropriate in content.    ED Course    Procedures  ED Vital Signs:  Vitals:    05/16/17 1525 05/16/17 1720 05/16/17 1743 05/16/17 1802   BP: 129/66  (!) 125/59 112/64   Pulse: 89 73 75 75   Resp: 18  13 15   Temp: 97.8 °F (36.6 °C)      TempSrc: Oral      SpO2: 96%  97% 95%   Weight: 93.9 kg (207 lb)      Height: 5' 7" (1.702 m)          Abnormal " Lab Results:  Labs Reviewed   CBC W/ AUTO DIFFERENTIAL - Abnormal; Notable for the following:        Result Value    RBC 3.56 (*)     Hematocrit 36.1 (*)      (*)     MCH 36.0 (*)     RDW 15.7 (*)     Mono # 1.1 (*)     All other components within normal limits   CULTURE, BLOOD   CULTURE, BLOOD   COMPREHENSIVE METABOLIC PANEL   LACTIC ACID, PLASMA   APTT   PROTIME-INR        All Lab Results:  Results for orders placed or performed during the hospital encounter of 05/16/17   CBC auto differential   Result Value Ref Range    WBC 8.30 3.90 - 12.70 K/uL    RBC 3.56 (L) 4.00 - 5.40 M/uL    Hemoglobin 12.8 12.0 - 16.0 g/dL    Hematocrit 36.1 (L) 37.0 - 48.5 %     (H) 82 - 98 fL    MCH 36.0 (H) 27.0 - 31.0 pg    MCHC 35.5 32.0 - 36.0 %    RDW 15.7 (H) 11.5 - 14.5 %    Platelets 241 150 - 350 K/uL    MPV 10.4 9.2 - 12.9 fL    Gran # 3.9 1.8 - 7.7 K/uL    Lymph # 3.1 1.0 - 4.8 K/uL    Mono # 1.1 (H) 0.3 - 1.0 K/uL    Eos # 0.1 0.0 - 0.5 K/uL    Baso # 0.05 0.00 - 0.20 K/uL    Gran% 47.4 38.0 - 73.0 %    Lymph% 37.7 18.0 - 48.0 %    Mono% 13.3 4.0 - 15.0 %    Eosinophil% 1.0 0.0 - 8.0 %    Basophil% 0.6 0.0 - 1.9 %    Differential Method Automated    Comprehensive metabolic panel   Result Value Ref Range    Sodium 138 136 - 145 mmol/L    Potassium 4.0 3.5 - 5.1 mmol/L    Chloride 105 95 - 110 mmol/L    CO2 24 23 - 29 mmol/L    Glucose 104 70 - 110 mg/dL    BUN, Bld 10 6 - 20 mg/dL    Creatinine 1.0 0.5 - 1.4 mg/dL    Calcium 9.3 8.7 - 10.5 mg/dL    Total Protein 7.4 6.0 - 8.4 g/dL    Albumin 3.7 3.5 - 5.2 g/dL    Total Bilirubin 0.2 0.1 - 1.0 mg/dL    Alkaline Phosphatase 84 55 - 135 U/L    AST 15 10 - 40 U/L    ALT 13 10 - 44 U/L    Anion Gap 9 8 - 16 mmol/L    eGFR if African American >60 >60 mL/min/1.73 m^2    eGFR if non African American >60 >60 mL/min/1.73 m^2   Lactic acid, plasma   Result Value Ref Range    Lactate (Lactic Acid) 1.1 0.5 - 2.2 mmol/L   APTT   Result Value Ref Range    aPTT 28.6 21.0 - 32.0  sec   Protime-INR   Result Value Ref Range    Prothrombin Time 10.0 9.0 - 12.5 sec    INR 1.0 0.8 - 1.2                The Emergency Provider reviewed the vital signs and test results, which are outlined above.    ED Discussion     7:23 PM: Discussed case with Viktoriya Vigil NP ([facility]). Dr. Aviles agrees with current care and management of pt and accepts admission.   Admitting Service: Hospital medicine   Admitting Physician: Dr. Aviles  Admit to: Tele    7:23 PM: Re-evaluated pt. I have discussed test results, shared treatment plan, and the need for admission with patient and family at bedside. Pt and family express understanding at this time and agree with all information. All questions answered. Pt and family have no further questions or concerns at this time. Pt is ready for admit.        ED Medication(s):  Medications   vancomycin (VANCOCIN) 1,250 mg in dextrose 5 % 250 mL IVPB (1,250 mg Intravenous Trough Due 30 minutes Before Dose 5/18/17 0500)   ondansetron injection 4 mg (4 mg Intravenous Given 5/16/17 1721)   cefepime in dextrose 5 % 1 gram/50 mL IVPB 1 g (1 g Intravenous Given 5/16/17 1723)   hydromorphone (PF) injection 1 mg (1 mg Intravenous Given 5/16/17 1720)   vancomycin (VANCOCIN) 1,500 mg in dextrose 5 % 250 mL IVPB (1,500 mg Intravenous New Bag 5/16/17 1834)       New Prescriptions    No medications on file             Medical Decision Making    Medical Decision Making:   Clinical Tests:   Lab Tests: Ordered and Reviewed           Scribe Attestation:   Scribe #1: I performed the above scribed service and the documentation accurately describes the services I performed. I attest to the accuracy of the note.    Attending:   Physician Attestation Statement for Scribe #1: I, Phoenix Parish Jr., MD, personally performed the services described in this documentation, as scribed by Marco A Kumar (Khaiden), in my presence, and it is both accurate and complete.          Clinical Impression        ICD-10-CM ICD-9-CM   1. Secondary cancer of bone C79.51 198.5   2. Malignant neoplasm of overlapping sites of right female breast C50.811 174.8   3. Suppurative hidradenitis L73.2 705.83   4. Abscess of right axilla L02.411 682.3   5. Abscess of left leg L02.416 682.6   6. Abscess of axilla, right L02.411 682.3       Disposition:   Disposition: Admitted  Condition: Fiordaliza Parish Jr., MD  05/16/17 7414

## 2017-05-16 NOTE — ED NOTES
Bed rails are up and call light is within patient reach. Bed is low and locked. Pt's family member at bedside.

## 2017-05-17 ENCOUNTER — ANESTHESIA EVENT (OUTPATIENT)
Dept: SURGERY | Facility: HOSPITAL | Age: 47
DRG: 571 | End: 2017-05-17
Payer: MEDICAID

## 2017-05-17 ENCOUNTER — ANESTHESIA (OUTPATIENT)
Dept: SURGERY | Facility: HOSPITAL | Age: 47
DRG: 571 | End: 2017-05-17
Payer: MEDICAID

## 2017-05-17 PROBLEM — L73.2 HIDRADENITIS SUPPURATIVA OF LEFT AXILLA: Status: ACTIVE | Noted: 2017-05-17

## 2017-05-17 LAB
ANION GAP SERPL CALC-SCNC: 9 MMOL/L
BASOPHILS # BLD AUTO: 0.04 K/UL
BASOPHILS NFR BLD: 0.5 %
BUN SERPL-MCNC: 9 MG/DL
CALCIUM SERPL-MCNC: 9 MG/DL
CHLORIDE SERPL-SCNC: 104 MMOL/L
CO2 SERPL-SCNC: 25 MMOL/L
CREAT SERPL-MCNC: 0.9 MG/DL
DIFFERENTIAL METHOD: ABNORMAL
EOSINOPHIL # BLD AUTO: 0.1 K/UL
EOSINOPHIL NFR BLD: 0.7 %
ERYTHROCYTE [DISTWIDTH] IN BLOOD BY AUTOMATED COUNT: 15.7 %
EST. GFR  (AFRICAN AMERICAN): >60 ML/MIN/1.73 M^2
EST. GFR  (NON AFRICAN AMERICAN): >60 ML/MIN/1.73 M^2
GLUCOSE SERPL-MCNC: 101 MG/DL
HCT VFR BLD AUTO: 34.7 %
HGB BLD-MCNC: 11.9 G/DL
INR PPP: 1
LYMPHOCYTES # BLD AUTO: 2 K/UL
LYMPHOCYTES NFR BLD: 27.2 %
MAGNESIUM SERPL-MCNC: 2.3 MG/DL
MCH RBC QN AUTO: 35.4 PG
MCHC RBC AUTO-ENTMCNC: 34.3 %
MCV RBC AUTO: 103 FL
MONOCYTES # BLD AUTO: 1.2 K/UL
MONOCYTES NFR BLD: 16 %
NEUTROPHILS # BLD AUTO: 4.1 K/UL
NEUTROPHILS NFR BLD: 55.6 %
PHOSPHATE SERPL-MCNC: 3.4 MG/DL
PLATELET # BLD AUTO: 225 K/UL
PMV BLD AUTO: 10.3 FL
POTASSIUM SERPL-SCNC: 4 MMOL/L
PROTHROMBIN TIME: 10 SEC
RBC # BLD AUTO: 3.36 M/UL
SODIUM SERPL-SCNC: 138 MMOL/L
WBC # BLD AUTO: 7.42 K/UL

## 2017-05-17 PROCEDURE — 99232 SBSQ HOSP IP/OBS MODERATE 35: CPT | Mod: 57,,, | Performed by: SURGERY

## 2017-05-17 PROCEDURE — 80048 BASIC METABOLIC PNL TOTAL CA: CPT

## 2017-05-17 PROCEDURE — 85610 PROTHROMBIN TIME: CPT

## 2017-05-17 PROCEDURE — 25000003 PHARM REV CODE 250: Performed by: NURSE ANESTHETIST, CERTIFIED REGISTERED

## 2017-05-17 PROCEDURE — 36000707: Performed by: SURGERY

## 2017-05-17 PROCEDURE — 63600175 PHARM REV CODE 636 W HCPCS: Performed by: ANESTHESIOLOGY

## 2017-05-17 PROCEDURE — 25000003 PHARM REV CODE 250: Performed by: SURGERY

## 2017-05-17 PROCEDURE — 36415 COLL VENOUS BLD VENIPUNCTURE: CPT

## 2017-05-17 PROCEDURE — 63600175 PHARM REV CODE 636 W HCPCS: Performed by: SURGERY

## 2017-05-17 PROCEDURE — 94799 UNLISTED PULMONARY SVC/PX: CPT

## 2017-05-17 PROCEDURE — 25000003 PHARM REV CODE 250: Performed by: PHYSICIAN ASSISTANT

## 2017-05-17 PROCEDURE — 37000008 HC ANESTHESIA 1ST 15 MINUTES: Performed by: SURGERY

## 2017-05-17 PROCEDURE — 21400001 HC TELEMETRY ROOM

## 2017-05-17 PROCEDURE — 71000033 HC RECOVERY, INTIAL HOUR: Performed by: SURGERY

## 2017-05-17 PROCEDURE — 11462 EXC SKN HDRDNT ING SMPL/NTRM: CPT | Mod: 51,,, | Performed by: SURGERY

## 2017-05-17 PROCEDURE — 63600175 PHARM REV CODE 636 W HCPCS: Performed by: INTERNAL MEDICINE

## 2017-05-17 PROCEDURE — 25000003 PHARM REV CODE 250: Performed by: NURSE PRACTITIONER

## 2017-05-17 PROCEDURE — 63600175 PHARM REV CODE 636 W HCPCS: Performed by: NURSE ANESTHETIST, CERTIFIED REGISTERED

## 2017-05-17 PROCEDURE — 25000003 PHARM REV CODE 250: Performed by: INTERNAL MEDICINE

## 2017-05-17 PROCEDURE — 99233 SBSQ HOSP IP/OBS HIGH 50: CPT | Mod: ,,, | Performed by: INTERNAL MEDICINE

## 2017-05-17 PROCEDURE — 88305 TISSUE EXAM BY PATHOLOGIST: CPT | Mod: 26,,, | Performed by: PATHOLOGY

## 2017-05-17 PROCEDURE — 36000706: Performed by: SURGERY

## 2017-05-17 PROCEDURE — 83735 ASSAY OF MAGNESIUM: CPT

## 2017-05-17 PROCEDURE — 11450 EXC SKN HDRDNT AX SMPL/NTRM: CPT | Mod: ,,, | Performed by: SURGERY

## 2017-05-17 PROCEDURE — 25000003 PHARM REV CODE 250: Performed by: EMERGENCY MEDICINE

## 2017-05-17 PROCEDURE — 94770 HC EXHALED C02 TEST: CPT

## 2017-05-17 PROCEDURE — 37000009 HC ANESTHESIA EA ADD 15 MINS: Performed by: SURGERY

## 2017-05-17 PROCEDURE — 88305 TISSUE EXAM BY PATHOLOGIST: CPT | Performed by: PATHOLOGY

## 2017-05-17 PROCEDURE — 25000003 PHARM REV CODE 250: Performed by: ANESTHESIOLOGY

## 2017-05-17 PROCEDURE — 99900035 HC TECH TIME PER 15 MIN (STAT)

## 2017-05-17 PROCEDURE — 84100 ASSAY OF PHOSPHORUS: CPT

## 2017-05-17 PROCEDURE — 97605 NEG PRS WND THER DME<=50SQCM: CPT | Mod: ,,, | Performed by: SURGERY

## 2017-05-17 PROCEDURE — 85025 COMPLETE CBC W/AUTO DIFF WBC: CPT

## 2017-05-17 RX ORDER — ACETAMINOPHEN 10 MG/ML
1000 INJECTION, SOLUTION INTRAVENOUS EVERY 8 HOURS
Status: DISPENSED | OUTPATIENT
Start: 2017-05-17 | End: 2017-05-18

## 2017-05-17 RX ORDER — SODIUM CHLORIDE, SODIUM LACTATE, POTASSIUM CHLORIDE, CALCIUM CHLORIDE 600; 310; 30; 20 MG/100ML; MG/100ML; MG/100ML; MG/100ML
INJECTION, SOLUTION INTRAVENOUS CONTINUOUS PRN
Status: DISCONTINUED | OUTPATIENT
Start: 2017-05-17 | End: 2017-05-17

## 2017-05-17 RX ORDER — MEPERIDINE HYDROCHLORIDE 50 MG/ML
12.5 INJECTION INTRAMUSCULAR; INTRAVENOUS; SUBCUTANEOUS ONCE AS NEEDED
Status: COMPLETED | OUTPATIENT
Start: 2017-05-17 | End: 2017-05-17

## 2017-05-17 RX ORDER — OXYCODONE AND ACETAMINOPHEN 10; 325 MG/1; MG/1
1 TABLET ORAL EVERY 4 HOURS PRN
Status: DISCONTINUED | OUTPATIENT
Start: 2017-05-17 | End: 2017-05-25 | Stop reason: HOSPADM

## 2017-05-17 RX ORDER — ACETAMINOPHEN 10 MG/ML
INJECTION, SOLUTION INTRAVENOUS
Status: DISCONTINUED | OUTPATIENT
Start: 2017-05-17 | End: 2017-05-17

## 2017-05-17 RX ORDER — ACETAMINOPHEN 325 MG/1
650 TABLET ORAL EVERY 6 HOURS PRN
Status: DISCONTINUED | OUTPATIENT
Start: 2017-05-17 | End: 2017-05-17

## 2017-05-17 RX ORDER — NALOXONE HCL 0.4 MG/ML
0.02 VIAL (ML) INJECTION
Status: DISCONTINUED | OUTPATIENT
Start: 2017-05-17 | End: 2017-05-17

## 2017-05-17 RX ORDER — MORPHINE SULFATE 10 MG/ML
2 INJECTION INTRAMUSCULAR; INTRAVENOUS; SUBCUTANEOUS EVERY 5 MIN PRN
Status: DISCONTINUED | OUTPATIENT
Start: 2017-05-17 | End: 2017-05-17 | Stop reason: HOSPADM

## 2017-05-17 RX ORDER — OXYCODONE HYDROCHLORIDE 5 MG/1
5 TABLET ORAL
Status: DISCONTINUED | OUTPATIENT
Start: 2017-05-17 | End: 2017-05-17 | Stop reason: HOSPADM

## 2017-05-17 RX ORDER — LINEZOLID 2 MG/ML
600 INJECTION, SOLUTION INTRAVENOUS
Status: DISCONTINUED | OUTPATIENT
Start: 2017-05-17 | End: 2017-05-24

## 2017-05-17 RX ORDER — SODIUM CHLORIDE, SODIUM LACTATE, POTASSIUM CHLORIDE, CALCIUM CHLORIDE 600; 310; 30; 20 MG/100ML; MG/100ML; MG/100ML; MG/100ML
INJECTION, SOLUTION INTRAVENOUS CONTINUOUS
Status: DISCONTINUED | OUTPATIENT
Start: 2017-05-17 | End: 2017-05-25 | Stop reason: HOSPADM

## 2017-05-17 RX ORDER — HYDROMORPHONE HCL IN 0.9% NACL 6 MG/30 ML
PATIENT CONTROLLED ANALGESIA SYRINGE INTRAVENOUS CONTINUOUS
Status: DISCONTINUED | OUTPATIENT
Start: 2017-05-17 | End: 2017-05-17

## 2017-05-17 RX ORDER — LIDOCAINE HCL/PF 100 MG/5ML
SYRINGE (ML) INTRAVENOUS
Status: DISCONTINUED | OUTPATIENT
Start: 2017-05-17 | End: 2017-05-17

## 2017-05-17 RX ORDER — FENTANYL CITRATE 50 UG/ML
INJECTION, SOLUTION INTRAMUSCULAR; INTRAVENOUS
Status: DISCONTINUED | OUTPATIENT
Start: 2017-05-17 | End: 2017-05-17

## 2017-05-17 RX ORDER — SUCCINYLCHOLINE CHLORIDE 20 MG/ML
INJECTION INTRAMUSCULAR; INTRAVENOUS
Status: DISCONTINUED | OUTPATIENT
Start: 2017-05-17 | End: 2017-05-17

## 2017-05-17 RX ORDER — MAG HYDROX/ALUMINUM HYD/SIMETH 200-200-20
30 SUSPENSION, ORAL (FINAL DOSE FORM) ORAL EVERY 6 HOURS PRN
Status: DISCONTINUED | OUTPATIENT
Start: 2017-05-17 | End: 2017-05-25 | Stop reason: HOSPADM

## 2017-05-17 RX ORDER — MIDAZOLAM HYDROCHLORIDE 1 MG/ML
INJECTION, SOLUTION INTRAMUSCULAR; INTRAVENOUS
Status: DISCONTINUED | OUTPATIENT
Start: 2017-05-17 | End: 2017-05-17

## 2017-05-17 RX ORDER — ENOXAPARIN SODIUM 100 MG/ML
40 INJECTION SUBCUTANEOUS
Status: DISCONTINUED | OUTPATIENT
Start: 2017-05-18 | End: 2017-05-25 | Stop reason: HOSPADM

## 2017-05-17 RX ORDER — OXYCODONE AND ACETAMINOPHEN 5; 325 MG/1; MG/1
1 TABLET ORAL EVERY 4 HOURS PRN
Status: DISCONTINUED | OUTPATIENT
Start: 2017-05-17 | End: 2017-05-25 | Stop reason: HOSPADM

## 2017-05-17 RX ORDER — ONDANSETRON 8 MG/1
8 TABLET, ORALLY DISINTEGRATING ORAL EVERY 8 HOURS PRN
Status: DISCONTINUED | OUTPATIENT
Start: 2017-05-17 | End: 2017-05-17

## 2017-05-17 RX ORDER — BUPIVACAINE HYDROCHLORIDE 2.5 MG/ML
INJECTION, SOLUTION EPIDURAL; INFILTRATION; INTRACAUDAL
Status: DISCONTINUED | OUTPATIENT
Start: 2017-05-17 | End: 2017-05-17 | Stop reason: HOSPADM

## 2017-05-17 RX ORDER — ONDANSETRON 2 MG/ML
INJECTION INTRAMUSCULAR; INTRAVENOUS
Status: DISCONTINUED | OUTPATIENT
Start: 2017-05-17 | End: 2017-05-17

## 2017-05-17 RX ORDER — MORPHINE SULFATE 4 MG/ML
4 INJECTION, SOLUTION INTRAMUSCULAR; INTRAVENOUS EVERY 4 HOURS PRN
Status: DISCONTINUED | OUTPATIENT
Start: 2017-05-17 | End: 2017-05-25 | Stop reason: HOSPADM

## 2017-05-17 RX ORDER — PROPOFOL 10 MG/ML
VIAL (ML) INTRAVENOUS
Status: DISCONTINUED | OUTPATIENT
Start: 2017-05-17 | End: 2017-05-17

## 2017-05-17 RX ORDER — SODIUM CHLORIDE 9 MG/ML
3 INJECTION, SOLUTION INTRAMUSCULAR; INTRAVENOUS; SUBCUTANEOUS
Status: DISCONTINUED | OUTPATIENT
Start: 2017-05-17 | End: 2017-05-25 | Stop reason: HOSPADM

## 2017-05-17 RX ORDER — ROCURONIUM BROMIDE 10 MG/ML
INJECTION, SOLUTION INTRAVENOUS
Status: DISCONTINUED | OUTPATIENT
Start: 2017-05-17 | End: 2017-05-17

## 2017-05-17 RX ADMIN — SODIUM CHLORIDE: 0.9 INJECTION, SOLUTION INTRAVENOUS at 12:05

## 2017-05-17 RX ADMIN — MORPHINE SULFATE 2 MG: 10 INJECTION, SOLUTION INTRAMUSCULAR; INTRAVENOUS at 04:05

## 2017-05-17 RX ADMIN — FENTANYL CITRATE 50 MCG: 50 INJECTION, SOLUTION INTRAMUSCULAR; INTRAVENOUS at 02:05

## 2017-05-17 RX ADMIN — LIDOCAINE HYDROCHLORIDE 40 MG: 20 INJECTION, SOLUTION INTRAVENOUS at 02:05

## 2017-05-17 RX ADMIN — MORPHINE SULFATE 4 MG: 4 INJECTION, SOLUTION INTRAMUSCULAR; INTRAVENOUS at 06:05

## 2017-05-17 RX ADMIN — SUCCINYLCHOLINE CHLORIDE 100 MG: 20 INJECTION, SOLUTION INTRAMUSCULAR; INTRAVENOUS at 02:05

## 2017-05-17 RX ADMIN — MIDAZOLAM HYDROCHLORIDE 2 MG: 1 INJECTION, SOLUTION INTRAMUSCULAR; INTRAVENOUS at 02:05

## 2017-05-17 RX ADMIN — PROPOFOL 200 MG: 10 INJECTION, EMULSION INTRAVENOUS at 02:05

## 2017-05-17 RX ADMIN — MEPERIDINE HYDROCHLORIDE 12.5 MG: 50 INJECTION INTRAMUSCULAR; INTRAVENOUS; SUBCUTANEOUS at 03:05

## 2017-05-17 RX ADMIN — ROCURONIUM BROMIDE 5 MG: 10 INJECTION, SOLUTION INTRAVENOUS at 02:05

## 2017-05-17 RX ADMIN — ACETAMINOPHEN 1000 MG: 10 INJECTION, SOLUTION INTRAVENOUS at 03:05

## 2017-05-17 RX ADMIN — ALPRAZOLAM 0.5 MG: 0.5 TABLET ORAL at 11:05

## 2017-05-17 RX ADMIN — OXYCODONE HYDROCHLORIDE AND ACETAMINOPHEN 1 TABLET: 5; 325 TABLET ORAL at 11:05

## 2017-05-17 RX ADMIN — LINEZOLID 600 MG: 600 INJECTION, SOLUTION INTRAVENOUS at 02:05

## 2017-05-17 RX ADMIN — ONDANSETRON 4 MG: 2 INJECTION, SOLUTION INTRAMUSCULAR; INTRAVENOUS at 02:05

## 2017-05-17 RX ADMIN — ACETAMINOPHEN 650 MG: 325 TABLET ORAL at 11:05

## 2017-05-17 RX ADMIN — BUPIVACAINE 266 MG: 13.3 INJECTION, SUSPENSION, LIPOSOMAL INFILTRATION at 03:05

## 2017-05-17 RX ADMIN — Medication: at 05:05

## 2017-05-17 RX ADMIN — SODIUM CHLORIDE, SODIUM LACTATE, POTASSIUM CHLORIDE, AND CALCIUM CHLORIDE: 600; 310; 30; 20 INJECTION, SOLUTION INTRAVENOUS at 02:05

## 2017-05-17 RX ADMIN — ACETAMINOPHEN 1000 MG: 10 INJECTION, SOLUTION INTRAVENOUS at 09:05

## 2017-05-17 RX ADMIN — SODIUM CHLORIDE, SODIUM LACTATE, POTASSIUM CHLORIDE, AND CALCIUM CHLORIDE: .6; .31; .03; .02 INJECTION, SOLUTION INTRAVENOUS at 05:05

## 2017-05-17 RX ADMIN — PROPOFOL 50 MG: 10 INJECTION, EMULSION INTRAVENOUS at 02:05

## 2017-05-17 NOTE — PLAN OF CARE
Escorted to preop bed 5 via stretcher and accompanied by techs. Report received from claude at 1400pm. Pt able to use bedpan without difficulty. preop completed and pt awaiting surgery.

## 2017-05-17 NOTE — ED NOTES
"Called to pt's room she states " im itching all over". Pt is has red blotches on face and all over. Airway is patent and she denies shortness of breath. New orders for Benadryl administered..  "

## 2017-05-17 NOTE — SUBJECTIVE & OBJECTIVE
Interval History: Pt was seen and examined at bedside . She states feel ok . She is s/p I&D  Inguinal and axilla abscess.     Review of Systems   Constitutional: Negative for chills, diaphoresis, fatigue and fever.   HENT: Negative for facial swelling, hearing loss, mouth sores, sneezing, sore throat, tinnitus and trouble swallowing.    Eyes: Negative for photophobia, pain, discharge, redness and visual disturbance.   Respiratory: Negative for apnea, cough, choking, chest tightness, shortness of breath, wheezing and stridor.    Cardiovascular: Negative for chest pain, palpitations and leg swelling.   Gastrointestinal: Negative for abdominal distention, abdominal pain, anal bleeding, blood in stool, constipation, diarrhea, nausea, rectal pain and vomiting.   Endocrine: Negative for cold intolerance, heat intolerance, polydipsia, polyphagia and polyuria.   Genitourinary: Negative for difficulty urinating, dysuria, flank pain, frequency, hematuria, pelvic pain, urgency, vaginal bleeding, vaginal discharge and vaginal pain.   Musculoskeletal: Negative for arthralgias, back pain, gait problem, myalgias and neck stiffness.   Skin: Negative for pallor, rash and wound.        + abscess to Right Axilla and Left groin, extremely tender to touch with surrounding erythema/edema. C/O itching to face and upper chest.    Allergic/Immunologic: Negative for food allergies.   Neurological: Negative for dizziness, tremors, seizures, syncope, speech difficulty, weakness and headaches.   Hematological: Negative for adenopathy. Does not bruise/bleed easily.   Psychiatric/Behavioral: Negative for behavioral problems and confusion. The patient is not nervous/anxious.    All other systems reviewed and are negative.    Objective:     Vital Signs (Most Recent):  Temp: 98.4 °F (36.9 °C) (05/17/17 1625)  Pulse: 77 (05/17/17 1625)  Resp: (!) 21 (05/17/17 1625)  BP: (!) 161/70 (05/17/17 1625)  SpO2: 96 % (05/17/17 1625) Vital Signs (24h  Range):  Temp:  [97.7 °F (36.5 °C)-98.8 °F (37.1 °C)] 98.4 °F (36.9 °C)  Pulse:  [] 77  Resp:  [13-22] 21  SpO2:  [92 %-100 %] 96 %  BP: ()/(59-73) 161/70     Weight: 93.9 kg (207 lb)  Body mass index is 32.42 kg/(m^2).    Intake/Output Summary (Last 24 hours) at 05/17/17 1650  Last data filed at 05/17/17 1539   Gross per 24 hour   Intake             1910 ml   Output                0 ml   Net             1910 ml      Physical Exam   Constitutional: She is oriented to person, place, and time. She appears well-developed and well-nourished.   HENT:   Head: Normocephalic and atraumatic.   Mouth/Throat: Oropharynx is clear and moist.   Eyes: Conjunctivae and EOM are normal. Pupils are equal, round, and reactive to light.   Neck: Normal range of motion. Neck supple.   Cardiovascular: Normal rate, regular rhythm, normal heart sounds and intact distal pulses.  Exam reveals no gallop and no friction rub.    No murmur heard.  Pulmonary/Chest: Effort normal and breath sounds normal. No respiratory distress. She has no wheezes. She has no rales. She exhibits no tenderness.   Abdominal: Soft. Bowel sounds are normal. She exhibits no distension and no mass. There is no tenderness.   Musculoskeletal: Normal range of motion. She exhibits no edema or tenderness.   Neurological: She is alert and oriented to person, place, and time.   Skin: Skin is warm and dry. No rash noted. No erythema.        Psychiatric: She has a normal mood and affect. Her behavior is normal. Judgment and thought content normal.   Nursing note and vitals reviewed.      Significant Labs:   BMP:   Recent Labs  Lab 05/17/17  0559         K 4.0      CO2 25   BUN 9   CREATININE 0.9   CALCIUM 9.0   MG 2.3     CBC:   Recent Labs  Lab 05/16/17  1720 05/17/17  0559   WBC 8.30 7.42   HGB 12.8 11.9*   HCT 36.1* 34.7*    225       Significant Imaging: I have reviewed all pertinent imaging results/findings within the past 24 hours.

## 2017-05-17 NOTE — PROGRESS NOTES
"[facility] Hospital Medicine  Progress Note    Patient Name: Josey Flores  MRN: [id number]  Patient Class: IP- Inpatient   Admission Date: 5/16/2017  Length of Stay: 1 days  Attending Physician: Sajan Sheridan, *  Primary Care Provider: Aileen Sadler MD        Subjective:     Principal Problem:Abscess of axilla, right    HPI:  This is a 45 y/o CF with no PMH except +tobacco abuse until January of this year when she was diagnosed with Breast Cancer to the Right Breast.  Patient was scheduled for a mastectomy, but she was ultimately determined to be Stage IV with mets to the bone and lymph nodes, therefore surgery was cancelled.  Patient reports taking oral chemo daily and has been given 1.5-2 years.  She presented to Dr. Delong's office today secondary to c/o "infected and swollen lymph nodes under Right arm, and to left groin".  Dr. Delong recommended she come to the ER for admission and IV ABX. Dr. Wahl is at the bedside evaluating and plans to take her to surgery tomorrow AM.  She is currently receiving Vanc and Cefepime.  BC have been drawn.  She is afebrile. Admit to Hospital Medicine. Keep NPO after MN for anticipated surgery tomorrow    Hospital Course:       Interval History: Pt was seen and examined at bedside . She states feel ok . She is s/p I&D  Inguinal and axilla abscess.     Review of Systems   Constitutional: Negative for chills, diaphoresis, fatigue and fever.   HENT: Negative for facial swelling, hearing loss, mouth sores, sneezing, sore throat, tinnitus and trouble swallowing.    Eyes: Negative for photophobia, pain, discharge, redness and visual disturbance.   Respiratory: Negative for apnea, cough, choking, chest tightness, shortness of breath, wheezing and stridor.    Cardiovascular: Negative for chest pain, palpitations and leg swelling.   Gastrointestinal: Negative for abdominal distention, abdominal pain, anal bleeding, blood in stool, constipation, " diarrhea, nausea, rectal pain and vomiting.   Endocrine: Negative for cold intolerance, heat intolerance, polydipsia, polyphagia and polyuria.   Genitourinary: Negative for difficulty urinating, dysuria, flank pain, frequency, hematuria, pelvic pain, urgency, vaginal bleeding, vaginal discharge and vaginal pain.   Musculoskeletal: Negative for arthralgias, back pain, gait problem, myalgias and neck stiffness.   Skin: Negative for pallor, rash and wound.        + abscess to Right Axilla and Left groin, extremely tender to touch with surrounding erythema/edema. C/O itching to face and upper chest.    Allergic/Immunologic: Negative for food allergies.   Neurological: Negative for dizziness, tremors, seizures, syncope, speech difficulty, weakness and headaches.   Hematological: Negative for adenopathy. Does not bruise/bleed easily.   Psychiatric/Behavioral: Negative for behavioral problems and confusion. The patient is not nervous/anxious.    All other systems reviewed and are negative.    Objective:     Vital Signs (Most Recent):  Temp: 98.4 °F (36.9 °C) (05/17/17 1625)  Pulse: 77 (05/17/17 1625)  Resp: (!) 21 (05/17/17 1625)  BP: (!) 161/70 (05/17/17 1625)  SpO2: 96 % (05/17/17 1625) Vital Signs (24h Range):  Temp:  [97.7 °F (36.5 °C)-98.8 °F (37.1 °C)] 98.4 °F (36.9 °C)  Pulse:  [] 77  Resp:  [13-22] 21  SpO2:  [92 %-100 %] 96 %  BP: ()/(59-73) 161/70     Weight: 93.9 kg (207 lb)  Body mass index is 32.42 kg/(m^2).    Intake/Output Summary (Last 24 hours) at 05/17/17 1650  Last data filed at 05/17/17 1539   Gross per 24 hour   Intake             1910 ml   Output                0 ml   Net             1910 ml      Physical Exam   Constitutional: She is oriented to person, place, and time. She appears well-developed and well-nourished.   HENT:   Head: Normocephalic and atraumatic.   Mouth/Throat: Oropharynx is clear and moist.   Eyes: Conjunctivae and EOM are normal. Pupils are equal, round, and reactive to  light.   Neck: Normal range of motion. Neck supple.   Cardiovascular: Normal rate, regular rhythm, normal heart sounds and intact distal pulses.  Exam reveals no gallop and no friction rub.    No murmur heard.  Pulmonary/Chest: Effort normal and breath sounds normal. No respiratory distress. She has no wheezes. She has no rales. She exhibits no tenderness.   Abdominal: Soft. Bowel sounds are normal. She exhibits no distension and no mass. There is no tenderness.   Musculoskeletal: Normal range of motion. She exhibits no edema or tenderness.   Neurological: She is alert and oriented to person, place, and time.   Skin: Skin is warm and dry. No rash noted. No erythema.        Psychiatric: She has a normal mood and affect. Her behavior is normal. Judgment and thought content normal.   Nursing note and vitals reviewed.      Significant Labs:   BMP:   Recent Labs  Lab 05/17/17  0559         K 4.0      CO2 25   BUN 9   CREATININE 0.9   CALCIUM 9.0   MG 2.3     CBC:   Recent Labs  Lab 05/16/17  1720 05/17/17  0559   WBC 8.30 7.42   HGB 12.8 11.9*   HCT 36.1* 34.7*    225       Significant Imaging: I have reviewed all pertinent imaging results/findings within the past 24 hours.    Assessment/Plan:      * Abscess of axilla, right  - S/P I&D  - Continue IV ABX  -F/U Blood and wound cx       Malignant neoplasm of overlapping sites of right female breast  - Follows with Dr. Delong  -Cont  IV ABX   - Per patient she has Stage IV metastatic breast cancer to the bone and lymph system  - Currently takes po chemo daily  - Overall Prognosis is poor (1.5-2 years per patient)  - Will need to f/u with hemoc upon dc    Malignant neoplasm metastatic to lymph node of axilla  Cont current tx    Secondary cancer of bone  - cont current tx      Abscess of left groin  - s/p I&D        Hidradenitis suppurativa of left axilla  - cont current tx      VTE Risk Mitigation         Ordered     enoxaparin injection 40 mg   Every 24 hours (non-standard times)     Route:  Subcutaneous        05/17/17 1643     Medium Risk of VTE  Once      05/17/17 1643     Place sequential compression device  Until discontinued      05/17/17 1643     Place sequential compression device  Until discontinued      05/16/17 2035          Sajan Sheridan MD  Department of Hospital Medicine   [facility]

## 2017-05-17 NOTE — PROGRESS NOTES
Pt had reaction to vancomycin in ED with redness and itching. Pt was given benadryl and resolved issues. Spoke to Dr. Aviles regarding remaining order and was told to hold dose.

## 2017-05-17 NOTE — ASSESSMENT & PLAN NOTE
- Follows with Dr. Delong  - Seen in clinic today and recommended hospital admission for IV ABX (see above)  - Per patient she has Stage IV metastatic breast cancer to the bone and lymph system  - Currently takes po chemo daily  - Overall Prognosis is poor (1.5-2 years per patient)  - Will need to f/u with hemoc upon dc

## 2017-05-17 NOTE — ED NOTES
Received report from DENISHA Richmond. Pt in NAD,VSS, RR equal and unlabored. Pt awaiting bed assignment. Pt's bed is low, locked, and call light in reach. SR up x 2. Will continue to monitor pt.

## 2017-05-17 NOTE — PLAN OF CARE
Pt lying in bed sleeping between care. No reports of pain at this time. Family updated upon pt arrival to recovery room.

## 2017-05-17 NOTE — SUBJECTIVE & OBJECTIVE
Past Medical History:   Diagnosis Date    Cancer     metastatic-lymph nodes, bone, and thyroid        Past Surgical History:   Procedure Laterality Date    ADENOIDECTOMY      APPENDECTOMY      CHOLECYSTECTOMY      TONSILLECTOMY         Review of patient's allergies indicates:  No Known Allergies    No current facility-administered medications on file prior to encounter.      Current Outpatient Prescriptions on File Prior to Encounter   Medication Sig    alprazolam (XANAX) 0.5 MG tablet Take 1 tablet (0.5 mg total) by mouth 2 (two) times daily as needed for Insomnia or Anxiety.    citalopram (CELEXA) 20 MG tablet Take 1 tablet (20 mg total) by mouth once daily.    ergocalciferol (VITAMIN D2) 50,000 unit Cap Take 50,000 Units by mouth every 7 days.    hydrocodone-acetaminophen 7.5-325mg (NORCO) 7.5-325 mg per tablet Take 1 tablet by mouth every 6 (six) hours as needed for Pain.    IBRANCE 125 mg Cap TAKE 1 CAPSULE (125 MG) DAILY FOR 21 DAYS, FOLLOWED BY 7 DAY REST PERIOD TO COMPLETE A 28 DAY TREATMENT CYCLE    letrozole (FEMARA) 2.5 mg Tab Take 1 tablet (2.5 mg total) by mouth once daily.    multivitamin (THERAGRAN) per tablet Take 1 tablet by mouth once daily.    albuterol 90 mcg/actuation inhaler Inhale 1-2 puffs into the lungs every 6 (six) hours as needed for Wheezing.    ondansetron (ZOFRAN-ODT) 4 MG TbDL Take 1 tablet (4 mg total) by mouth every 6 (six) hours as needed.     Family History     None        Social History Main Topics    Smoking status: Current Every Day Smoker     Packs/day: 1.00     Types: Cigarettes    Smokeless tobacco: Never Used      Comment: no smoking after mn prior to surgery    Alcohol use No    Drug use: No    Sexual activity: Yes     Partners: Male     Review of Systems   Constitutional: Negative for chills, diaphoresis, fatigue and fever.   HENT: Negative for facial swelling, hearing loss, mouth sores, sneezing, sore throat, tinnitus and trouble swallowing.    Eyes:  Negative for photophobia, pain, discharge, redness and visual disturbance.   Respiratory: Negative for apnea, cough, choking, chest tightness, shortness of breath, wheezing and stridor.    Cardiovascular: Negative for chest pain, palpitations and leg swelling.   Gastrointestinal: Negative for abdominal distention, abdominal pain, anal bleeding, blood in stool, constipation, diarrhea, nausea, rectal pain and vomiting.   Endocrine: Negative for cold intolerance, heat intolerance, polydipsia, polyphagia and polyuria.   Genitourinary: Negative for difficulty urinating, dysuria, flank pain, frequency, hematuria, pelvic pain, urgency, vaginal bleeding, vaginal discharge and vaginal pain.   Musculoskeletal: Negative for arthralgias, back pain, gait problem, myalgias and neck stiffness.   Skin: Negative for pallor, rash and wound.        + abscess to Right Axilla and Left groin, extremely tender to touch with surrounding erythema/edema. C/O itching to face and upper chest.    Allergic/Immunologic: Negative for food allergies.   Neurological: Negative for dizziness, tremors, seizures, syncope, speech difficulty, weakness and headaches.   Hematological: Negative for adenopathy. Does not bruise/bleed easily.   Psychiatric/Behavioral: Negative for behavioral problems and confusion. The patient is not nervous/anxious.    All other systems reviewed and are negative.    Objective:     Vital Signs (Most Recent):  Temp: 97.8 °F (36.6 °C) (05/16/17 1525)  Pulse: 75 (05/16/17 1802)  Resp: 15 (05/16/17 1802)  BP: 112/64 (05/16/17 1802)  SpO2: 95 % (05/16/17 1802) Vital Signs (24h Range):  Temp:  [97.8 °F (36.6 °C)-98 °F (36.7 °C)] 97.8 °F (36.6 °C)  Pulse:  [63-89] 75  Resp:  [13-18] 15  SpO2:  [95 %-98 %] 95 %  BP: (106-129)/(59-67) 112/64     Weight: 93.9 kg (207 lb)  Body mass index is 32.42 kg/(m^2).    Physical Exam   Constitutional: She is oriented to person, place, and time. She appears well-developed and well-nourished.   HENT:    Head: Normocephalic and atraumatic.   Mouth/Throat: Oropharynx is clear and moist.   Eyes: Conjunctivae and EOM are normal. Pupils are equal, round, and reactive to light.   Neck: Normal range of motion. Neck supple.   Cardiovascular: Normal rate, regular rhythm, normal heart sounds and intact distal pulses.  Exam reveals no gallop and no friction rub.    No murmur heard.  Pulmonary/Chest: Effort normal and breath sounds normal. No respiratory distress. She has no wheezes. She has no rales. She exhibits no tenderness.   Abdominal: Soft. Bowel sounds are normal. She exhibits no distension and no mass. There is no tenderness.   Musculoskeletal: Normal range of motion. She exhibits no edema or tenderness.   Neurological: She is alert and oriented to person, place, and time.   Skin: Skin is warm and dry. No rash noted. No erythema.        Psychiatric: She has a normal mood and affect. Her behavior is normal. Judgment and thought content normal.   Nursing note and vitals reviewed.       Significant Labs:   Blood Culture: No results for input(s): LABBLOO in the last 48 hours.  CBC:   Recent Labs  Lab 05/16/17  1720   WBC 8.30   HGB 12.8   HCT 36.1*        CMP:   Recent Labs  Lab 05/16/17  1720      K 4.0      CO2 24      BUN 10   CREATININE 1.0   CALCIUM 9.3   PROT 7.4   ALBUMIN 3.7   BILITOT 0.2   ALKPHOS 84   AST 15   ALT 13   ANIONGAP 9   EGFRNONAA >60       Significant Imaging: I have reviewed all pertinent imaging results/findings within the past 24 hours.   Imaging Results     None

## 2017-05-17 NOTE — ASSESSMENT & PLAN NOTE
- Dr. Wahl at bedside evaluating  - Plan for surgery tomorrow  - Will keep NPO after midnight  - Continue IV ABX  - BC drawn  - Remains afebrile  - CBC in AM  - No leukocytosis  - Monitor

## 2017-05-17 NOTE — ASSESSMENT & PLAN NOTE
Secondary to hidradenitis.  Patient will need the area excised says that all actively infected skin is removed.  This will likely leave a large open wound that will by her wound VAC treatment and subsequent skin grafting

## 2017-05-17 NOTE — PLAN OF CARE
"Met with patient at bedside for Discharge Assessment.  Patient reports that she was diagnosed about 5 months ago with Metastatic Breast Cancer and states that she is here to "remove some of my lymph nodes because of them having cancer."  Patient reports that her oncologist is Dr. Delong; she has seen outpatient  at clinic; and began oral chemotherapy about 4 months ago.  Patient aware of and has accessed [facility].  Patient states that she has a supportive family, including her fiance, sister (David Shuff [phone]) and two adult sons (ages 32 and 28 yo) who reside nearby.  Patient indicates that she will likely need HH SN services post-hospitalization.  Discussed with patient that this  will arrange all post-hospitalization services and will continue to follow her throughout this admission. Explained to and provided patient with [facility] Discharge Planning Brochure and this 's contact information should any needs or concerns arise prior to my returning to meet with patient.    PLAN:  Continue to follow       05/17/17 7426   Discharge Assessment   Assessment Type Discharge Planning Assessment   Confirmed/corrected address and phone number on facesheet? Yes   Assessment information obtained from? Patient;Medical Record   Expected Length of Stay (days) (TBD)   Communicated expected length of stay with patient/caregiver no   Type of Healthcare Directive Received ("I have completed an Advance Directive; have it at home; and will be bringing it into the clinic or hospital." )   Prior to hospitilization cognitive status: Alert/Oriented   Prior to hospitalization functional status: Independent   Current cognitive status: Alert/Oriented   Current Functional Status: Independent   Arrived From admitted as an inpatient;home or self-care   Lives With significant other  (Patient resides with her fianceAlessio, in a mobile home in [place], [place])   Able " to Return to Prior Arrangements yes   Is patient able to care for self after discharge? Yes   How many people do you have in your home that can help with your care after discharge? 1   Who are your caregiver(s) and their phone number(s)? Chi Jamil: [phone]    Patient's perception of discharge disposition home or selfcare   Readmission Within The Last 30 Days no previous admission in last 30 days   Patient currently being followed by outpatient case management? No   Patient currently receives home health services? No   Does the patient currently use HME? No   Patient currently receives private duty nursing? No   Patient currently receives any other outside agency services? No   Equipment Currently Used at Home none   Do you have any problems affording any of your prescribed medications? No   Is the patient taking medications as prescribed? yes   Do you have any financial concerns preventing you from receiving the healthcare you need? No   Does the patient have transportation to healthcare appointments? Yes   Transportation Available car;family or friend will provide   On Dialysis? No   Does the patient receive services at the Coumadin Clinic? No   Are there any open cases? No   Discharge Plan A Home with family;Home Health   Discharge Plan B Home with family;Other  (Outpatient Wound Care Clinic)   Patient/Family In Agreement With Plan yes

## 2017-05-17 NOTE — ANESTHESIA POSTPROCEDURE EVALUATION
"Anesthesia Post Evaluation    Patient: Josey Flores    Procedure(s) Performed: Procedure(s) (LRB):  DEBRIDEMENT (Bilateral)    Final Anesthesia Type: general  Patient location during evaluation: PACU  Patient participation: Yes- Able to Participate  Level of consciousness: awake and alert  Post-procedure vital signs: reviewed and stable  Pain management: adequate  Airway patency: patent  PONV status at discharge: No PONV  Anesthetic complications: no      Cardiovascular status: hemodynamically stable  Respiratory status: spontaneous ventilation  Hydration status: euvolemic  Follow-up not needed.        Visit Vitals    /69 (BP Location: Left arm, Patient Position: Lying, BP Method: Automatic)    Pulse 74    Temp 36.6 °C (97.9 °F) (Oral)    Resp 20    Ht 5' 7" (1.702 m)    Wt 93.9 kg (207 lb)    LMP  (LMP Unknown)    SpO2 96%    Breastfeeding No    BMI 32.42 kg/m2       Pain/Shraddha Score: Pain Assessment Performed: Yes (5/17/2017  5:30 PM)  Presence of Pain: non-verbal indicators absent (5/17/2017  5:30 PM)  Pain Rating Prior to Med Admin: 9 (5/17/2017  5:52 PM)  Pain Rating Post Med Admin: 3 (5/17/2017 12:41 PM)  Shraddha Score: 8 (5/17/2017  4:15 PM)      "

## 2017-05-17 NOTE — OP NOTE
[facility]  Surgery Department  Operative Note    SUMMARY     Date of Procedure: 5/17/2017     Procedure: Procedure(s) (LRB):  DEBRIDEMENT (Bilateral)     Surgeon(s) and Role:     * Rodger Wahl MD - Primary    Assisting Surgeon: None    Pre-Operative Diagnosis: Abscess of left leg [L02.416]  Abscess of axilla, right [L02.411]    Post-Operative Diagnosis: Post-Op Diagnosis Codes:     * Abscess of left leg [L02.416]     * Abscess of axilla, right [L02.411]    Anesthesia: General    Technical Procedures Used: Excision of axillary and thigh skin, and subcutaneous tissue.  These areas were sharply excised using a scalpel and electrocautery.  Wound VAC was then placed      Description of the Findings of the Procedure:     She was brought into the operating room placed on the operative table in supine position.  General endotracheal anesthesia was induced.  She was receiving IV antibiotics.  Pneumatic compression devices plus in lower jasson.  Bilateral axilla and left thigh were clipped, prepped and draped in the standard manner.    A timeout was performed.    The area of the hidradenitis and infection of the right axilla was outlined with a marker.  The skin was then incised along this line using a scalpel blade.  The skin and underlying subcutaneous tissue/subcutaneous fat was then sharply removed using electrocautery.  Hemostasis was achieved using electrocautery.  A laparotomy pack was placed.    Attention was then turned to the left axilla where once again the area of chronically infected skin/hidradenitis was outlined with a working pain.  The skin was sharply incised along the line.  The skin and underlying subcutaneous tissue last subcutaneous fat was then sharply removed using electrocautery.  Hemostasis was achieved and a laparotomy pack was placed.    Attention was then turned to the area of infection/hidradenitis of the left groin.  Once again the area was outlined with a marking pen.   The skin was incised.  The skin and underlying cutaneous tissue/fat was excised sharply using the electrocautery.    Hemostasis was achieved.  A mixture of Marcaine and Experal was infiltrated.  A black wound VAC sponge was cut to shape and secured with Tegaderm dressings.    Attention was then turned to the left axilla were once again the area of excision was inspected and hemostasis ensured.  A mixture of Marcaine and Experal was infiltrated.  The wound was then covered with black foam held in place by Tegaderm.  A good seal was obtained.    Potential the right axilla were hemostasis was again achieved using electrocautery.  A mixture of Marcaine and Experal was infiltrated.  Black foam was cut to size.  The area was secured with large Tegaderms a good seal was obtained.  All 3 areas were then connected to the wound VAC with a good seal our suction on the wound VAC.    Patient hard the procedure well transferred to the recovery room in stable condition    Significant Surgical Tasks Conducted by the Assistant(s), if Applicable: None    Complications: No    Estimated Blood Loss (EBL): * 75 ML's  IV fluids approximately 200  Marcaine 0.25% 30 miles  Experal 2.66 mg           Implants: * No implants in log *    Specimens:   Specimen (12h ago through future)    Start     Ordered    05/17/17 1504  Specimen to Pathology - Surgery  Once     Comments:  1) Hidradenitis from bilateral axilla and left groin (perm)          *Right axilla-one stitch          *Left Axilla- two stitches          *Left Groin- three stitches    Dx: Hidradenitis    05/17/17 1512                  Condition: Stable    Disposition: PACU - hemodynamically stable.    Attestation: I performed the procedure.

## 2017-05-17 NOTE — ASSESSMENT & PLAN NOTE
Secondary to hidradenitis.  The serial need to be excised and either treated with local wound care are wound VAC will likely heal by secondary intention

## 2017-05-17 NOTE — SUBJECTIVE & OBJECTIVE
No current facility-administered medications on file prior to encounter.      Current Outpatient Prescriptions on File Prior to Encounter   Medication Sig    alprazolam (XANAX) 0.5 MG tablet Take 1 tablet (0.5 mg total) by mouth 2 (two) times daily as needed for Insomnia or Anxiety.    citalopram (CELEXA) 20 MG tablet Take 1 tablet (20 mg total) by mouth once daily.    ergocalciferol (VITAMIN D2) 50,000 unit Cap Take 50,000 Units by mouth every 7 days.    hydrocodone-acetaminophen 7.5-325mg (NORCO) 7.5-325 mg per tablet Take 1 tablet by mouth every 6 (six) hours as needed for Pain.    IBRANCE 125 mg Cap TAKE 1 CAPSULE (125 MG) DAILY FOR 21 DAYS, FOLLOWED BY 7 DAY REST PERIOD TO COMPLETE A 28 DAY TREATMENT CYCLE    letrozole (FEMARA) 2.5 mg Tab Take 1 tablet (2.5 mg total) by mouth once daily.    multivitamin (THERAGRAN) per tablet Take 1 tablet by mouth once daily.    albuterol 90 mcg/actuation inhaler Inhale 1-2 puffs into the lungs every 6 (six) hours as needed for Wheezing.    ondansetron (ZOFRAN-ODT) 4 MG TbDL Take 1 tablet (4 mg total) by mouth every 6 (six) hours as needed.       Review of patient's allergies indicates:   Allergen Reactions    Vancomycin analogues Itching       Past Medical History:   Diagnosis Date    Cancer     metastatic-lymph nodes, bone, and thyroid      Past Surgical History:   Procedure Laterality Date    ADENOIDECTOMY      APPENDECTOMY      CHOLECYSTECTOMY      TONSILLECTOMY       Family History     None        Social History Main Topics    Smoking status: Current Every Day Smoker     Packs/day: 1.00     Types: Cigarettes    Smokeless tobacco: Never Used      Comment: no smoking after mn prior to surgery    Alcohol use No    Drug use: No    Sexual activity: Yes     Partners: Male     Review of Systems   Constitutional: Negative for fever.   HENT: Negative.    Eyes: Negative.    Respiratory: Negative.    Cardiovascular: Negative.    Gastrointestinal: Negative.     Endocrine: Negative.    Genitourinary: Negative.    Musculoskeletal: Negative.    Skin:        Swelling and changes of the right axilla and left groin   Allergic/Immunologic: Negative.    Neurological: Negative.    Hematological: Negative.    Psychiatric/Behavioral: Negative.      Objective:     Vital Signs (Most Recent):  Temp: 97.8 °F (36.6 °C) (05/16/17 1525)  Pulse: 81 (05/16/17 2008)  Resp: 17 (05/16/17 2008)  BP: 115/65 (05/16/17 2008)  SpO2: 98 % (05/16/17 2008) Vital Signs (24h Range):  Temp:  [97.8 °F (36.6 °C)-98 °F (36.7 °C)] 97.8 °F (36.6 °C)  Pulse:  [63-89] 81  Resp:  [13-18] 17  SpO2:  [95 %-98 %] 98 %  BP: (106-129)/(59-67) 115/65     Weight: 93.9 kg (207 lb)  Body mass index is 32.42 kg/(m^2).    Physical Exam   Constitutional: She is oriented to person, place, and time. She appears well-developed. No distress.   HENT:   Head: Normocephalic and atraumatic.   Eyes: Scleral icterus is present.   Neck: Neck supple.   Cardiovascular: Normal rate and regular rhythm.    Pulmonary/Chest: Effort normal.   Abdominal: She exhibits no distension.   Musculoskeletal: Normal range of motion.   Neurological: She is oriented to person, place, and time.   Skin:   There is erythema, fluctuance and tenderness as well as chronic scarring of the right axilla.  This involves most of the axilla.    There is chronic changes but no erythema or fluctuance or tenderness of the left axilla.    There is erythema and skin changes as well as a smaller fluctuance of the left groin    There is no evidence of any infection of the right groin   Vitals reviewed.      Significant Labs:  CBC:   Recent Labs  Lab 05/16/17  1720   WBC 8.30   RBC 3.56*   HGB 12.8   HCT 36.1*      *   MCH 36.0*   MCHC 35.5     BMP:   Recent Labs  Lab 05/16/17  1720         K 4.0      CO2 24   BUN 10   CREATININE 1.0   CALCIUM 9.3     CMP:   Recent Labs  Lab 05/16/17  1720      CALCIUM 9.3   ALBUMIN 3.7   PROT 7.4       K 4.0   CO2 24      BUN 10   CREATININE 1.0   ALKPHOS 84   ALT 13   AST 15   BILITOT 0.2       Significant Diagnostics:  None

## 2017-05-17 NOTE — TRANSFER OF CARE
"Anesthesia Transfer of Care Note    Patient: Josey Flores    Procedure(s) Performed: Procedure(s) (LRB):  DEBRIDEMENT (Bilateral)    Patient location: PACU    Anesthesia Type: general    Transport from OR: Transported from OR on room air with adequate spontaneous ventilation    Post pain: adequate analgesia    Post assessment: no apparent anesthetic complications    Post vital signs: stable    Level of consciousness: awake, alert and oriented    Nausea/Vomiting: no nausea/vomiting    Complications: none    Transfer of care protocol was followed      Last vitals:   Visit Vitals    /63 (BP Location: Left arm, Patient Position: Lying, BP Method: Automatic)    Pulse 85    Temp 37.1 °C (98.8 °F) (Oral)    Resp 18    Ht 5' 7" (1.702 m)    Wt 93.9 kg (207 lb)    LMP  (LMP Unknown)    SpO2 (!) 92%    Breastfeeding No    BMI 32.42 kg/m2     "

## 2017-05-17 NOTE — ASSESSMENT & PLAN NOTE
Secondary to hidradenitis.  Patient will need the area excised says that all actively infected skin is removed.  This will likely leave a large open wound that will by her wound VAC treatment and subsequent skin grafting    Surgery today

## 2017-05-17 NOTE — ANESTHESIA RELEASE NOTE
"Anesthesia Release from PACU Note    Patient: Josey Flores    Procedure(s) Performed: Procedure(s) (LRB):  DEBRIDEMENT (Bilateral)    Anesthesia type: general    Post pain: Adequate analgesia    Post assessment: no apparent anesthetic complications, tolerated procedure well and no evidence of recall    Last Vitals:   Visit Vitals    /69 (BP Location: Left arm, Patient Position: Lying, BP Method: Automatic)    Pulse 74    Temp 36.6 °C (97.9 °F) (Oral)    Resp 20    Ht 5' 7" (1.702 m)    Wt 93.9 kg (207 lb)    LMP  (LMP Unknown)    SpO2 96%    Breastfeeding No    BMI 32.42 kg/m2       Post vital signs: stable    Level of consciousness: awake, alert  and oriented    Nausea/Vomiting: no nausea/no vomiting    Complications: none    Airway Patency: patent    Respiratory: unassisted, spontaneous ventilation, room air    Cardiovascular: stable and blood pressure at baseline    Hydration: euvolemic  "

## 2017-05-17 NOTE — CONSULTS
[facility] -   General Surgery  Consult Note    Patient Name: Josey Flores  MRN: [id number]  Code Status: No Order  Admission Date: 5/16/2017  Hospital Length of Stay: 0 days  Attending Physician: Phoenix Parish Jr., MD  Primary Care Provider: Aileen Sadler MD    Patient information was obtained from patient and ER records.     Inpatient consult to General surgery  Consult performed by: KRISTY STANFORD  Consult ordered by: DARSHAN PARRISH  Reason for consult: abscess of right axilla        Subjective:     Principal Problem: Abscess of axilla, right    History of Present Illness: Patient has a history of hidradenitis.  This has been essentially treated with antibiotics and local wound care.  He has had not had extensive surgery.  She was diagnosed with breast mass in the metastatic breast cancer.  She is on treatment with ibrance and letrozole.  She saw her oncologist today and complained about redness, pain and swelling of the right axilla and the left groin.  He diagnosed her with a active infection in the area of hidradenitis and sent to the emergency room to be admitted for IV antibiotics and surgical consultation.    Patient states she's had hidradenitis for many years she never had any aggressive procedures regarding treatment for this.  She also has changes of her left axilla but these are not actively bothering her.  She has not had any significant right groin involvement in the recent past      No current facility-administered medications on file prior to encounter.      Current Outpatient Prescriptions on File Prior to Encounter   Medication Sig    alprazolam (XANAX) 0.5 MG tablet Take 1 tablet (0.5 mg total) by mouth 2 (two) times daily as needed for Insomnia or Anxiety.    citalopram (CELEXA) 20 MG tablet Take 1 tablet (20 mg total) by mouth once daily.    ergocalciferol (VITAMIN D2) 50,000 unit Cap Take 50,000 Units by mouth every 7 days.    hydrocodone-acetaminophen  7.5-325mg (NORCO) 7.5-325 mg per tablet Take 1 tablet by mouth every 6 (six) hours as needed for Pain.    IBRANCE 125 mg Cap TAKE 1 CAPSULE (125 MG) DAILY FOR 21 DAYS, FOLLOWED BY 7 DAY REST PERIOD TO COMPLETE A 28 DAY TREATMENT CYCLE    letrozole (FEMARA) 2.5 mg Tab Take 1 tablet (2.5 mg total) by mouth once daily.    multivitamin (THERAGRAN) per tablet Take 1 tablet by mouth once daily.    albuterol 90 mcg/actuation inhaler Inhale 1-2 puffs into the lungs every 6 (six) hours as needed for Wheezing.    ondansetron (ZOFRAN-ODT) 4 MG TbDL Take 1 tablet (4 mg total) by mouth every 6 (six) hours as needed.       Review of patient's allergies indicates:   Allergen Reactions    Vancomycin analogues Itching       Past Medical History:   Diagnosis Date    Cancer     metastatic-lymph nodes, bone, and thyroid      Past Surgical History:   Procedure Laterality Date    ADENOIDECTOMY      APPENDECTOMY      CHOLECYSTECTOMY      TONSILLECTOMY       Family History     None        Social History Main Topics    Smoking status: Current Every Day Smoker     Packs/day: 1.00     Types: Cigarettes    Smokeless tobacco: Never Used      Comment: no smoking after mn prior to surgery    Alcohol use No    Drug use: No    Sexual activity: Yes     Partners: Male     Review of Systems   Constitutional: Negative for fever.   HENT: Negative.    Eyes: Negative.    Respiratory: Negative.    Cardiovascular: Negative.    Gastrointestinal: Negative.    Endocrine: Negative.    Genitourinary: Negative.    Musculoskeletal: Negative.    Skin:        Swelling and changes of the right axilla and left groin   Allergic/Immunologic: Negative.    Neurological: Negative.    Hematological: Negative.    Psychiatric/Behavioral: Negative.      Objective:     Vital Signs (Most Recent):  Temp: 97.8 °F (36.6 °C) (05/16/17 1525)  Pulse: 81 (05/16/17 2008)  Resp: 17 (05/16/17 2008)  BP: 115/65 (05/16/17 2008)  SpO2: 98 % (05/16/17 2008) Vital Signs (24h  Range):  Temp:  [97.8 °F (36.6 °C)-98 °F (36.7 °C)] 97.8 °F (36.6 °C)  Pulse:  [63-89] 81  Resp:  [13-18] 17  SpO2:  [95 %-98 %] 98 %  BP: (106-129)/(59-67) 115/65     Weight: 93.9 kg (207 lb)  Body mass index is 32.42 kg/(m^2).    Physical Exam   Constitutional: She is oriented to person, place, and time. She appears well-developed. No distress.   HENT:   Head: Normocephalic and atraumatic.   Eyes: Scleral icterus is present.   Neck: Neck supple.   Cardiovascular: Normal rate and regular rhythm.    Pulmonary/Chest: Effort normal.   Abdominal: She exhibits no distension.   Musculoskeletal: Normal range of motion.   Neurological: She is oriented to person, place, and time.   Skin:   There is erythema, fluctuance and tenderness as well as chronic scarring of the right axilla.  This involves most of the axilla.    There is chronic changes but no erythema or fluctuance or tenderness of the left axilla.    There is erythema and skin changes as well as a smaller fluctuance of the left groin    There is no evidence of any infection of the right groin   Vitals reviewed.      Significant Labs:  CBC:   Recent Labs  Lab 05/16/17  1720   WBC 8.30   RBC 3.56*   HGB 12.8   HCT 36.1*      *   MCH 36.0*   MCHC 35.5     BMP:   Recent Labs  Lab 05/16/17  1720         K 4.0      CO2 24   BUN 10   CREATININE 1.0   CALCIUM 9.3     CMP:   Recent Labs  Lab 05/16/17  1720      CALCIUM 9.3   ALBUMIN 3.7   PROT 7.4      K 4.0   CO2 24      BUN 10   CREATININE 1.0   ALKPHOS 84   ALT 13   AST 15   BILITOT 0.2       Significant Diagnostics:  None    Assessment/Plan:     * Abscess of axilla, right  Secondary to hidradenitis.  Patient will need the area excised says that all actively infected skin is removed.  This will likely leave a large open wound that will by her wound VAC treatment and subsequent skin grafting    Malignant neoplasm of overlapping sites of right female breast  Management  per oncology    Malignant neoplasm metastatic to lymph node of axilla  If any enlarged lymph nodes are encountered and debridement of the hidradenitis of the right axilla these can be excised at that time    Secondary cancer of bone  Management per oncology    Abscess of left groin  Secondary to hidradenitis.  The serial need to be excised and either treated with local wound care are wound VAC will likely heal by secondary intention    VTE Risk Mitigation     None        Surgery is planned for tomorrow afternoon    Thank you for your consult. I will follow-up with patient. Please contact us if you have any additional questions.    Rodger Wahl MD  General Surgery  [facility]

## 2017-05-17 NOTE — ASSESSMENT & PLAN NOTE
Secondary to hidradenitis.  The serial need to be excised and either treated with local wound care are wound VAC will likely heal by secondary intention  Surgery today

## 2017-05-17 NOTE — PLAN OF CARE
Problem: Patient Care Overview  Goal: Plan of Care Review  Outcome: Ongoing (interventions implemented as appropriate)  Plan of care reviewed and discussed with patient.  No acute distress noted.  Pain managed adequately.  Safety and fall precautions reviewed and discussed with patient.  Will continue to monitor.

## 2017-05-17 NOTE — H&P
"[facility] Hospital Medicine  History & Physical    Patient Name: Josey Flores  MRN: [id number]  Admission Date: 5/16/2017  Attending Physician: Dr. Heidi MD   Primary Care Provider: Aileen Sadler MD         Patient information was obtained from patient, spouse/SO, past medical records and ER records.     Subjective:     Principal Problem:Abscess of axilla, right    Chief Complaint:   Chief Complaint   Patient presents with    Cellulitis     sent from clinic for "infected sweat glands" for admit and IV abx.         HPI: This is a 45 y/o CF with no PMH except +tobacco abuse until January of this year when she was diagnosed with Breast Cancer to the Right Breast.  Patient was scheduled for a mastectomy, but she was ultimately determined to be Stage IV with mets to the bone and lymph nodes, therefore surgery was cancelled.  Patient reports taking oral chemo daily and has been given 1.5-2 years.  She presented to Dr. Delong's office today secondary to c/o "infected and swollen lymph nodes under Right arm, and to left groin".  Dr. Delong recommended she come to the ER for admission and IV ABX. Dr. Wahl is at the bedside evaluating and plans to take her to surgery tomorrow AM.  She is currently receiving Vanc and Cefepime.  BC have been drawn.  She is afebrile. Admit to Hospital Medicine. Keep NPO after MN for anticipated surgery tomorrow    Past Medical History:   Diagnosis Date    Cancer     metastatic-lymph nodes, bone, and thyroid        Past Surgical History:   Procedure Laterality Date    ADENOIDECTOMY      APPENDECTOMY      CHOLECYSTECTOMY      TONSILLECTOMY         Review of patient's allergies indicates:  No Known Allergies    No current facility-administered medications on file prior to encounter.      Current Outpatient Prescriptions on File Prior to Encounter   Medication Sig    alprazolam (XANAX) 0.5 MG tablet Take 1 tablet (0.5 mg total) by mouth 2 (two) times " daily as needed for Insomnia or Anxiety.    citalopram (CELEXA) 20 MG tablet Take 1 tablet (20 mg total) by mouth once daily.    ergocalciferol (VITAMIN D2) 50,000 unit Cap Take 50,000 Units by mouth every 7 days.    hydrocodone-acetaminophen 7.5-325mg (NORCO) 7.5-325 mg per tablet Take 1 tablet by mouth every 6 (six) hours as needed for Pain.    IBRANCE 125 mg Cap TAKE 1 CAPSULE (125 MG) DAILY FOR 21 DAYS, FOLLOWED BY 7 DAY REST PERIOD TO COMPLETE A 28 DAY TREATMENT CYCLE    letrozole (FEMARA) 2.5 mg Tab Take 1 tablet (2.5 mg total) by mouth once daily.    multivitamin (THERAGRAN) per tablet Take 1 tablet by mouth once daily.    albuterol 90 mcg/actuation inhaler Inhale 1-2 puffs into the lungs every 6 (six) hours as needed for Wheezing.    ondansetron (ZOFRAN-ODT) 4 MG TbDL Take 1 tablet (4 mg total) by mouth every 6 (six) hours as needed.     Family History     None        Social History Main Topics    Smoking status: Current Every Day Smoker     Packs/day: 1.00     Types: Cigarettes    Smokeless tobacco: Never Used      Comment: no smoking after mn prior to surgery    Alcohol use No    Drug use: No    Sexual activity: Yes     Partners: Male     Review of Systems   Constitutional: Negative for chills, diaphoresis, fatigue and fever.   HENT: Negative for facial swelling, hearing loss, mouth sores, sneezing, sore throat, tinnitus and trouble swallowing.    Eyes: Negative for photophobia, pain, discharge, redness and visual disturbance.   Respiratory: Negative for apnea, cough, choking, chest tightness, shortness of breath, wheezing and stridor.    Cardiovascular: Negative for chest pain, palpitations and leg swelling.   Gastrointestinal: Negative for abdominal distention, abdominal pain, anal bleeding, blood in stool, constipation, diarrhea, nausea, rectal pain and vomiting.   Endocrine: Negative for cold intolerance, heat intolerance, polydipsia, polyphagia and polyuria.   Genitourinary: Negative for  difficulty urinating, dysuria, flank pain, frequency, hematuria, pelvic pain, urgency, vaginal bleeding, vaginal discharge and vaginal pain.   Musculoskeletal: Negative for arthralgias, back pain, gait problem, myalgias and neck stiffness.   Skin: Negative for pallor, rash and wound.        + abscess to Right Axilla and Left groin, extremely tender to touch with surrounding erythema/edema. C/O itching to face and upper chest.    Allergic/Immunologic: Negative for food allergies.   Neurological: Negative for dizziness, tremors, seizures, syncope, speech difficulty, weakness and headaches.   Hematological: Negative for adenopathy. Does not bruise/bleed easily.   Psychiatric/Behavioral: Negative for behavioral problems and confusion. The patient is not nervous/anxious.    All other systems reviewed and are negative.    Objective:     Vital Signs (Most Recent):  Temp: 97.8 °F (36.6 °C) (05/16/17 1525)  Pulse: 75 (05/16/17 1802)  Resp: 15 (05/16/17 1802)  BP: 112/64 (05/16/17 1802)  SpO2: 95 % (05/16/17 1802) Vital Signs (24h Range):  Temp:  [97.8 °F (36.6 °C)-98 °F (36.7 °C)] 97.8 °F (36.6 °C)  Pulse:  [63-89] 75  Resp:  [13-18] 15  SpO2:  [95 %-98 %] 95 %  BP: (106-129)/(59-67) 112/64     Weight: 93.9 kg (207 lb)  Body mass index is 32.42 kg/(m^2).    Physical Exam   Constitutional: She is oriented to person, place, and time. She appears well-developed and well-nourished.   HENT:   Head: Normocephalic and atraumatic.   Mouth/Throat: Oropharynx is clear and moist.   Eyes: Conjunctivae and EOM are normal. Pupils are equal, round, and reactive to light.   Neck: Normal range of motion. Neck supple.   Cardiovascular: Normal rate, regular rhythm, normal heart sounds and intact distal pulses.  Exam reveals no gallop and no friction rub.    No murmur heard.  Pulmonary/Chest: Effort normal and breath sounds normal. No respiratory distress. She has no wheezes. She has no rales. She exhibits no tenderness.   Abdominal: Soft.  Bowel sounds are normal. She exhibits no distension and no mass. There is no tenderness.   Musculoskeletal: Normal range of motion. She exhibits no edema or tenderness.   Neurological: She is alert and oriented to person, place, and time.   Skin: Skin is warm and dry. No rash noted. No erythema.        Psychiatric: She has a normal mood and affect. Her behavior is normal. Judgment and thought content normal.   Nursing note and vitals reviewed.       Significant Labs:   Blood Culture: No results for input(s): LABBLOO in the last 48 hours.  CBC:   Recent Labs  Lab 05/16/17  1720   WBC 8.30   HGB 12.8   HCT 36.1*        CMP:   Recent Labs  Lab 05/16/17  1720      K 4.0      CO2 24      BUN 10   CREATININE 1.0   CALCIUM 9.3   PROT 7.4   ALBUMIN 3.7   BILITOT 0.2   ALKPHOS 84   AST 15   ALT 13   ANIONGAP 9   EGFRNONAA >60       Significant Imaging: I have reviewed all pertinent imaging results/findings within the past 24 hours.   Imaging Results     None        Assessment/Plan:     * Abscess of axilla, right  - Dr. Wahl at bedside evaluating  - Plan for surgery tomorrow  - Will keep NPO after midnight  - Continue IV ABX  - BC drawn  - Remains afebrile  - CBC in AM  - No leukocytosis  - Monitor      Abscess of left groin  - See Above      Malignant neoplasm of overlapping sites of right female breast  - Follows with Dr. Delong  - Seen in clinic today and recommended hospital admission for IV ABX (see above)  - Per patient she has Stage IV metastatic breast cancer to the bone and lymph system  - Currently takes po chemo daily  - Overall Prognosis is poor (1.5-2 years per patient)  - Will need to f/u with hemoc upon dc    VTE Risk Mitigation     None      HOLD AC for now pending surgery in AM    Shakila Santiago NP  Department of Hospital Medicine   [facility]

## 2017-05-17 NOTE — ASSESSMENT & PLAN NOTE
- Follows with Dr. Delong  -Cont  IV ABX   - Per patient she has Stage IV metastatic breast cancer to the bone and lymph system  - Currently takes po chemo daily  - Overall Prognosis is poor (1.5-2 years per patient)  - Will need to f/u with hemoc upon dc

## 2017-05-17 NOTE — ANESTHESIA PREPROCEDURE EVALUATION
05/17/2017  Josey Flores is a 46 y.o., female.    Anesthesia Evaluation    I have reviewed the Patient Summary Reports.    I have reviewed the Nursing Notes.   I have reviewed the Medications.     Review of Systems  Anesthesia Hx:  No problems with previous Anesthesia  History of prior surgery of interest to airway management or planning: Denies Family Hx of Anesthesia complications.   Denies Personal Hx of Anesthesia complications.   Social:  Smoker    Hematology/Oncology:         -- Cancer in past history: Breast right   EENT/Dental:EENT/Dental Normal   Cardiovascular:  Cardiovascular Normal  ECG has been reviewed.    Pulmonary:  Pulmonary Normal    Renal/:  Renal/ Normal     Hepatic/GI:  Hepatic/GI Normal    Musculoskeletal:  Musculoskeletal Normal    Neurological:  Neurology Normal    Endocrine:  Endocrine Normal    Psych:   anxiety          Physical Exam  General:  Well nourished    Airway/Jaw/Neck:  Airway Findings: Mouth Opening: Normal Tongue: Normal  General Airway Assessment: Adult  Mallampati: II  TM Distance: Normal, at least 6 cm          Chest/Lungs:  Chest/Lungs Findings: Normal Respiratory Rate     Heart/Vascular:  Heart Findings: Rate: Normal             Anesthesia Plan  Type of Anesthesia, risks & benefits discussed:  Anesthesia Type:  general  Patient's Preference:   Intra-op Monitoring Plan: standard ASA monitors  Intra-op Monitoring Plan Comments:   Post Op Pain Control Plan:   Post Op Pain Control Plan Comments:   Induction:   IV  Beta Blocker:  Patient is not currently on a Beta-Blocker (No further documentation required).       Informed Consent: Patient understands risks and agrees with Anesthesia plan.  Questions answered. Anesthesia consent signed with patient.  ASA Score: 2     Day of Surgery Review of History & Physical:    H&P update referred to the surgeon.         Ready  For Surgery From Anesthesia Perspective.

## 2017-05-17 NOTE — PROGRESS NOTES
[facility] -   General Surgery  Progress Note    Subjective:     History of Present Illness:  Patient has a history of hidradenitis.  This has been essentially treated with antibiotics and local wound care.  He has had not had extensive surgery.  She was diagnosed with breast mass in the metastatic breast cancer.  She is on treatment with ibrance and letrozole.  She saw her oncologist today and complained about redness, pain and swelling of the right axilla and the left groin.  He diagnosed her with a active infection in the area of hidradenitis and sent to the emergency room to be admitted for IV antibiotics and surgical consultation.    Patient states she's had hidradenitis for many years she never had any aggressive procedures regarding treatment for this.  She also has changes of her left axilla but these are not actively bothering her.  She has not had any significant right groin involvement in the recent past      Post-Op Info:  Procedure(s) (LRB):  DEBRIDEMENT (Bilateral)   Day of Surgery     Interval History: Patient to the operating room today for debridement of both axillas and wound VAC placement and debridement of the groin    Medications:  Continuous Infusions:   Scheduled Meds:   bupivacaine liposome (PF)  20 mL Infiltration Once    citalopram  20 mg Oral Daily    [START ON 5/23/2017] ergocalciferol  50,000 Units Oral Q7 Days    linezolid 600mg/300ml  600 mg Intravenous Q12H    multivitamin  1 tablet Oral Daily    pantoprazole  40 mg Oral Daily     PRN Meds:acetaminophen, albuterol sulfate, alprazolam, aluminum-magnesium hydroxide-simethicone, dextromethorphan-guaifenesin  mg/5 ml, dextrose 50%, dextrose 50%, diphenhydrAMINE, glucagon (human recombinant), glucose, glucose, metoclopramide HCl, morphine, ondansetron, ondansetron, sodium chloride 0.9%     Review of patient's allergies indicates:   Allergen Reactions    Vancomycin analogues Itching     Objective:     Vital Signs  (Most Recent):  Temp: 98.8 °F (37.1 °C) (05/17/17 1219)  Pulse: 85 (05/17/17 1219)  Resp: 18 (05/17/17 1219)  BP: 116/63 (05/17/17 1219)  SpO2: (!) 92 % (05/17/17 1219) Vital Signs (24h Range):  Temp:  [97.7 °F (36.5 °C)-98.8 °F (37.1 °C)] 98.8 °F (37.1 °C)  Pulse:  [64-92] 85  Resp:  [13-18] 18  SpO2:  [92 %-98 %] 92 %  BP: ()/(59-73) 116/63     Weight: 93.9 kg (207 lb)  Body mass index is 32.42 kg/(m^2).    Intake/Output - Last 3 Shifts       05/15 0700 - 05/16 0659 05/16 0700 - 05/17 0659 05/17 0700 - 05/18 0659    P.O.  360     I.V. (mL/kg)  1000 (10.7)     IV Piggyback  250     Total Intake(mL/kg)  1610 (17.1)     Net   +1610             Urine Occurrence  3 x 1 x          Physical Exam   Constitutional: She appears well-developed and well-nourished.   Eyes: No scleral icterus.   Cardiovascular: Normal rate.    Pulmonary/Chest: Effort normal and breath sounds normal.   Abdominal: Soft.   Skin:   There are chronic skin changes, erythema and drainage in the right axilla    There is skin changes in the left axilla    The skin changes and inflammation and drainage of the left groin   Psychiatric: She has a normal mood and affect.       Significant Labs:  CBC:   Recent Labs  Lab 05/17/17  0559   WBC 7.42   RBC 3.36*   HGB 11.9*   HCT 34.7*      *   MCH 35.4*   MCHC 34.3     BMP:   Recent Labs  Lab 05/17/17  0559         K 4.0      CO2 25   BUN 9   CREATININE 0.9   CALCIUM 9.0   MG 2.3     CMP:   Recent Labs  Lab 05/16/17  1720 05/17/17  0559    101   CALCIUM 9.3 9.0   ALBUMIN 3.7  --    PROT 7.4  --     138   K 4.0 4.0   CO2 24 25    104   BUN 10 9   CREATININE 1.0 0.9   ALKPHOS 84  --    ALT 13  --    AST 15  --    BILITOT 0.2  --        Significant Diagnostics:  None    Assessment/Plan:     * Abscess of axilla, right  Secondary to hidradenitis.  Patient will need the area excised says that all actively infected skin is removed.  This will likely leave a  large open wound that will by her wound VAC treatment and subsequent skin grafting    Surgery today    Abscess of left groin  Secondary to hidradenitis.  The serial need to be excised and either treated with local wound care are wound VAC will likely heal by secondary intention  Surgery today    Hidradenitis suppurativa of left axilla  Excision and wound VAC placement      Rodger Wahl MD  General Surgery  [facility]

## 2017-05-17 NOTE — ASSESSMENT & PLAN NOTE
If any enlarged lymph nodes are encountered and debridement of the hidradenitis of the right axilla these can be excised at that time

## 2017-05-17 NOTE — SUBJECTIVE & OBJECTIVE
Interval History: Patient to the operating room today for debridement of both axillas and wound VAC placement and debridement of the groin    Medications:  Continuous Infusions:   Scheduled Meds:   bupivacaine liposome (PF)  20 mL Infiltration Once    citalopram  20 mg Oral Daily    [START ON 5/23/2017] ergocalciferol  50,000 Units Oral Q7 Days    linezolid 600mg/300ml  600 mg Intravenous Q12H    multivitamin  1 tablet Oral Daily    pantoprazole  40 mg Oral Daily     PRN Meds:acetaminophen, albuterol sulfate, alprazolam, aluminum-magnesium hydroxide-simethicone, dextromethorphan-guaifenesin  mg/5 ml, dextrose 50%, dextrose 50%, diphenhydrAMINE, glucagon (human recombinant), glucose, glucose, metoclopramide HCl, morphine, ondansetron, ondansetron, sodium chloride 0.9%     Review of patient's allergies indicates:   Allergen Reactions    Vancomycin analogues Itching     Objective:     Vital Signs (Most Recent):  Temp: 98.8 °F (37.1 °C) (05/17/17 1219)  Pulse: 85 (05/17/17 1219)  Resp: 18 (05/17/17 1219)  BP: 116/63 (05/17/17 1219)  SpO2: (!) 92 % (05/17/17 1219) Vital Signs (24h Range):  Temp:  [97.7 °F (36.5 °C)-98.8 °F (37.1 °C)] 98.8 °F (37.1 °C)  Pulse:  [64-92] 85  Resp:  [13-18] 18  SpO2:  [92 %-98 %] 92 %  BP: ()/(59-73) 116/63     Weight: 93.9 kg (207 lb)  Body mass index is 32.42 kg/(m^2).    Intake/Output - Last 3 Shifts       05/15 0700 - 05/16 0659 05/16 0700 - 05/17 0659 05/17 0700 - 05/18 0659    P.O.  360     I.V. (mL/kg)  1000 (10.7)     IV Piggyback  250     Total Intake(mL/kg)  1610 (17.1)     Net   +1610             Urine Occurrence  3 x 1 x          Physical Exam   Constitutional: She appears well-developed and well-nourished.   Eyes: No scleral icterus.   Cardiovascular: Normal rate.    Pulmonary/Chest: Effort normal and breath sounds normal.   Abdominal: Soft.   Skin:   There are chronic skin changes, erythema and drainage in the right axilla    There is skin changes in the left  axilla    The skin changes and inflammation and drainage of the left groin   Psychiatric: She has a normal mood and affect.       Significant Labs:  CBC:   Recent Labs  Lab 05/17/17  0559   WBC 7.42   RBC 3.36*   HGB 11.9*   HCT 34.7*      *   MCH 35.4*   MCHC 34.3     BMP:   Recent Labs  Lab 05/17/17  0559         K 4.0      CO2 25   BUN 9   CREATININE 0.9   CALCIUM 9.0   MG 2.3     CMP:   Recent Labs  Lab 05/16/17  1720 05/17/17  0559    101   CALCIUM 9.3 9.0   ALBUMIN 3.7  --    PROT 7.4  --     138   K 4.0 4.0   CO2 24 25    104   BUN 10 9   CREATININE 1.0 0.9   ALKPHOS 84  --    ALT 13  --    AST 15  --    BILITOT 0.2  --        Significant Diagnostics:  None

## 2017-05-18 ENCOUNTER — ANESTHESIA EVENT (OUTPATIENT)
Dept: SURGERY | Facility: HOSPITAL | Age: 47
DRG: 571 | End: 2017-05-18
Payer: MEDICAID

## 2017-05-18 LAB
ANION GAP SERPL CALC-SCNC: 5 MMOL/L
BASOPHILS # BLD AUTO: 0.04 K/UL
BASOPHILS NFR BLD: 0.6 %
BUN SERPL-MCNC: 7 MG/DL
CALCIUM SERPL-MCNC: 8.5 MG/DL
CHLORIDE SERPL-SCNC: 106 MMOL/L
CO2 SERPL-SCNC: 28 MMOL/L
CREAT SERPL-MCNC: 0.8 MG/DL
DIFFERENTIAL METHOD: ABNORMAL
EOSINOPHIL # BLD AUTO: 0.1 K/UL
EOSINOPHIL NFR BLD: 1.2 %
ERYTHROCYTE [DISTWIDTH] IN BLOOD BY AUTOMATED COUNT: 15.9 %
EST. GFR  (AFRICAN AMERICAN): >60 ML/MIN/1.73 M^2
EST. GFR  (NON AFRICAN AMERICAN): >60 ML/MIN/1.73 M^2
GLUCOSE SERPL-MCNC: 98 MG/DL
HCT VFR BLD AUTO: 33.3 %
HGB BLD-MCNC: 11.2 G/DL
LYMPHOCYTES # BLD AUTO: 2.9 K/UL
LYMPHOCYTES NFR BLD: 42.9 %
MCH RBC QN AUTO: 35 PG
MCHC RBC AUTO-ENTMCNC: 33.6 %
MCV RBC AUTO: 104 FL
MONOCYTES # BLD AUTO: 0.7 K/UL
MONOCYTES NFR BLD: 10.7 %
NEUTROPHILS # BLD AUTO: 3 K/UL
NEUTROPHILS NFR BLD: 44.6 %
PLATELET # BLD AUTO: 232 K/UL
PMV BLD AUTO: 10.4 FL
POTASSIUM SERPL-SCNC: 3.9 MMOL/L
RBC # BLD AUTO: 3.2 M/UL
SODIUM SERPL-SCNC: 139 MMOL/L
WBC # BLD AUTO: 6.81 K/UL

## 2017-05-18 PROCEDURE — 63600175 PHARM REV CODE 636 W HCPCS: Performed by: INTERNAL MEDICINE

## 2017-05-18 PROCEDURE — 85025 COMPLETE CBC W/AUTO DIFF WBC: CPT

## 2017-05-18 PROCEDURE — 63600175 PHARM REV CODE 636 W HCPCS: Performed by: SURGERY

## 2017-05-18 PROCEDURE — 25000003 PHARM REV CODE 250: Performed by: EMERGENCY MEDICINE

## 2017-05-18 PROCEDURE — 94799 UNLISTED PULMONARY SVC/PX: CPT

## 2017-05-18 PROCEDURE — 36415 COLL VENOUS BLD VENIPUNCTURE: CPT

## 2017-05-18 PROCEDURE — 99232 SBSQ HOSP IP/OBS MODERATE 35: CPT | Mod: ,,, | Performed by: INTERNAL MEDICINE

## 2017-05-18 PROCEDURE — 25000003 PHARM REV CODE 250: Performed by: NURSE PRACTITIONER

## 2017-05-18 PROCEDURE — 21400001 HC TELEMETRY ROOM

## 2017-05-18 PROCEDURE — 25000003 PHARM REV CODE 250: Performed by: INTERNAL MEDICINE

## 2017-05-18 PROCEDURE — 80048 BASIC METABOLIC PNL TOTAL CA: CPT

## 2017-05-18 PROCEDURE — 25000003 PHARM REV CODE 250: Performed by: SURGERY

## 2017-05-18 RX ADMIN — SODIUM CHLORIDE, SODIUM LACTATE, POTASSIUM CHLORIDE, AND CALCIUM CHLORIDE: .6; .31; .03; .02 INJECTION, SOLUTION INTRAVENOUS at 05:05

## 2017-05-18 RX ADMIN — MORPHINE SULFATE 4 MG: 4 INJECTION, SOLUTION INTRAMUSCULAR; INTRAVENOUS at 04:05

## 2017-05-18 RX ADMIN — THERA TABS 1 TABLET: TAB at 08:05

## 2017-05-18 RX ADMIN — ACETAMINOPHEN 1000 MG: 10 INJECTION, SOLUTION INTRAVENOUS at 01:05

## 2017-05-18 RX ADMIN — OXYCODONE HYDROCHLORIDE AND ACETAMINOPHEN 1 TABLET: 10; 325 TABLET ORAL at 05:05

## 2017-05-18 RX ADMIN — MORPHINE SULFATE 4 MG: 4 INJECTION, SOLUTION INTRAMUSCULAR; INTRAVENOUS at 08:05

## 2017-05-18 RX ADMIN — ENOXAPARIN SODIUM 40 MG: 100 INJECTION SUBCUTANEOUS at 01:05

## 2017-05-18 RX ADMIN — OXYCODONE HYDROCHLORIDE AND ACETAMINOPHEN 1 TABLET: 5; 325 TABLET ORAL at 11:05

## 2017-05-18 RX ADMIN — PANTOPRAZOLE SODIUM 40 MG: 40 TABLET, DELAYED RELEASE ORAL at 08:05

## 2017-05-18 RX ADMIN — MORPHINE SULFATE 4 MG: 4 INJECTION, SOLUTION INTRAMUSCULAR; INTRAVENOUS at 01:05

## 2017-05-18 RX ADMIN — ALPRAZOLAM 0.5 MG: 0.5 TABLET ORAL at 11:05

## 2017-05-18 RX ADMIN — LINEZOLID 600 MG: 600 INJECTION, SOLUTION INTRAVENOUS at 02:05

## 2017-05-18 RX ADMIN — CITALOPRAM HYDROBROMIDE 20 MG: 20 TABLET ORAL at 08:05

## 2017-05-18 RX ADMIN — SODIUM CHLORIDE, SODIUM LACTATE, POTASSIUM CHLORIDE, AND CALCIUM CHLORIDE: .6; .31; .03; .02 INJECTION, SOLUTION INTRAVENOUS at 04:05

## 2017-05-18 NOTE — PROGRESS NOTES
"[facility] Hospital Medicine  Progress Note    Patient Name: Josey Flores  MRN: [id number]  Patient Class: IP- Inpatient   Admission Date: 5/16/2017  Length of Stay: 2 days  Attending Physician: Sajan Sheridan, *  Primary Care Provider: Aileen Sadler MD        Subjective:     Principal Problem:Abscess of axilla, right    HPI:  This is a 45 y/o CF with no PMH except +tobacco abuse until January of this year when she was diagnosed with Breast Cancer to the Right Breast.  Patient was scheduled for a mastectomy, but she was ultimately determined to be Stage IV with mets to the bone and lymph nodes, therefore surgery was cancelled.  Patient reports taking oral chemo daily and has been given 1.5-2 years.  She presented to Dr. Delong's office today secondary to c/o "infected and swollen lymph nodes under Right arm, and to left groin".  Dr. Delong recommended she come to the ER for admission and IV ABX. Dr. Wahl is at the bedside evaluating and plans to take her to surgery tomorrow AM.  She is currently receiving Vanc and Cefepime.  BC have been drawn.  She is afebrile. Admit to Hospital Medicine. Keep NPO after MN for anticipated surgery tomorrow    Hospital Course:  Pt underwent an I & D to bilateral axilla and left groin by Dr. Wahl 5/17/18. Areas have intact wound vac. She has pain only to right axilla with movement. Blood cultures NGTD. NPO after MN for return to surgery 5/19/18.     Interval History:     Review of Systems   Constitutional: Negative for appetite change, chills, fatigue and fever.   HENT: Negative.    Eyes: Negative for pain and redness.   Respiratory: Negative for cough, shortness of breath and wheezing.    Cardiovascular: Negative for chest pain and leg swelling.   Gastrointestinal: Negative for abdominal pain, diarrhea, nausea and vomiting.   Genitourinary: Negative for dysuria and frequency.   Musculoskeletal: Positive for myalgias.   Skin: Positive " for wound.   Neurological: Negative for dizziness, light-headedness and headaches.   Psychiatric/Behavioral: Negative for confusion.     Objective:     Vital Signs (Most Recent):  Temp: 98.4 °F (36.9 °C) (05/18/17 0700)  Pulse: 91 (05/18/17 0700)  Resp: 16 (05/18/17 0700)  BP: (!) 115/55 (05/18/17 0700)  SpO2: (!) 93 % (05/18/17 0700) Vital Signs (24h Range):  Temp:  [97.8 °F (36.6 °C)-98.8 °F (37.1 °C)] 98.4 °F (36.9 °C)  Pulse:  [] 91  Resp:  [12-22] 16  SpO2:  [90 %-100 %] 93 %  BP: (112-194)/(55-72) 115/55     Weight: 93.9 kg (207 lb)  Body mass index is 32.42 kg/(m^2).    Intake/Output Summary (Last 24 hours) at 05/18/17 1128  Last data filed at 05/18/17 0600   Gross per 24 hour   Intake          2219.33 ml   Output               25 ml   Net          2194.33 ml      Physical Exam   Constitutional: She is oriented to person, place, and time. She appears well-developed and well-nourished. No distress.   overweight   HENT:   Head: Normocephalic and atraumatic.   Eyes: Conjunctivae are normal.   Neck: Normal range of motion. Neck supple.   Cardiovascular: Normal rate, regular rhythm and normal heart sounds.    Pulmonary/Chest: Effort normal and breath sounds normal.   Abdominal: Soft. Bowel sounds are normal.   Musculoskeletal: Normal range of motion.   Neurological: She is alert and oriented to person, place, and time.   Skin:   Wound vac to bilateral axilla and left groin   Psychiatric: She has a normal mood and affect. Her behavior is normal.   Nursing note and vitals reviewed.      Significant Labs:   Blood Culture:   Recent Labs  Lab 05/16/17  1655 05/16/17  1720   LABBLOO No Growth to date  No Growth to date No Growth to date  No Growth to date     CBC:   Recent Labs  Lab 05/16/17  1720 05/17/17  0559 05/18/17  0500   WBC 8.30 7.42 6.81   HGB 12.8 11.9* 11.2*   HCT 36.1* 34.7* 33.3*    225 232     CMP:   Recent Labs  Lab 05/16/17  1720 05/17/17  0559 05/18/17  0500    138 139   K 4.0 4.0  3.9    104 106   CO2 24 25 28    101 98   BUN 10 9 7   CREATININE 1.0 0.9 0.8   CALCIUM 9.3 9.0 8.5*   PROT 7.4  --   --    ALBUMIN 3.7  --   --    BILITOT 0.2  --   --    ALKPHOS 84  --   --    AST 15  --   --    ALT 13  --   --    ANIONGAP 9 9 5*   EGFRNONAA >60 >60 >60     All pertinent labs within the past 24 hours have been reviewed.    Significant Imaging: I have reviewed all pertinent imaging results/findings within the past 24 hours.    Assessment/Plan:      * Abscess of axilla, right  - S/P I&D  - Continue IV Zyvox  -F/U Blood cultures  NPO after MN for repeat I & D 5/19/18      Malignant neoplasm of overlapping sites of right female breast  - Follows with Dr. Delong  -Cont  IV Zyvox  - Per patient she has Stage IV metastatic breast cancer to the bone and lymph system  - Currently takes po chemo daily  - Overall Prognosis is poor (1.5-2 years per patient)  - Will need to f/u with hemoc upon dc    Malignant neoplasm metastatic to lymph node of axilla  Cont current tx    Secondary cancer of bone  - cont current tx      Abscess of left groin  - s/p I&D        Hidradenitis suppurativa of left axilla  - cont current tx      VTE Risk Mitigation         Ordered     enoxaparin injection 40 mg  Every 24 hours (non-standard times)     Route:  Subcutaneous        05/17/17 1643     Medium Risk of VTE  Once      05/17/17 1643     Place sequential compression device  Until discontinued      05/17/17 1643     Place sequential compression device  Until discontinued      05/16/17 2035          Saira Jack NP  Department of Hospital Medicine   [facility]

## 2017-05-18 NOTE — PROGRESS NOTES
[facility] -   General Surgery  Progress Note    Subjective:     History of Present Illness:  Patient has a history of hidradenitis.  This has been essentially treated with antibiotics and local wound care.  He has had not had extensive surgery.  She was diagnosed with breast mass in the metastatic breast cancer.  She is on treatment with ibrance and letrozole.  She saw her oncologist today and complained about redness, pain and swelling of the right axilla and the left groin.  He diagnosed her with a active infection in the area of hidradenitis and sent to the emergency room to be admitted for IV antibiotics and surgical consultation.    Patient states she's had hidradenitis for many years she never had any aggressive procedures regarding treatment for this.  She also has changes of her left axilla but these are not actively bothering her.  She has not had any significant right groin involvement in the recent past      Post-Op Info:  Procedure(s) (LRB):  DEBRIDEMENT (Bilateral)   1 Day Post-Op     Interval History:mild right axillary pain    Medications:  Continuous Infusions:   lactated ringers 100 mL/hr at 05/18/17 0407     Scheduled Meds:   acetaminophen  1,000 mg Intravenous Q8H    citalopram  20 mg Oral Daily    enoxparin  40 mg Subcutaneous Q24H    [START ON 5/23/2017] ergocalciferol  50,000 Units Oral Q7 Days    linezolid 600mg/300ml  600 mg Intravenous Q12H    multivitamin  1 tablet Oral Daily    pantoprazole  40 mg Oral Daily     PRN Meds:albuterol sulfate, alprazolam, aluminum-magnesium hydroxide-simethicone, dextromethorphan-guaifenesin  mg/5 ml, dextrose 50%, dextrose 50%, diphenhydrAMINE, glucagon (human recombinant), glucose, glucose, metoclopramide HCl, morphine, ondansetron, ondansetron, oxycodone-acetaminophen, oxycodone-acetaminophen, sodium chloride 0.9%     Review of patient's allergies indicates:   Allergen Reactions    Vancomycin analogues Itching     Objective:      Vital Signs (Most Recent):  Temp: 98.4 °F (36.9 °C) (05/18/17 0700)  Pulse: 91 (05/18/17 0700)  Resp: 16 (05/18/17 0700)  BP: (!) 115/55 (05/18/17 0700)  SpO2: (!) 93 % (05/18/17 0700) Vital Signs (24h Range):  Temp:  [97.8 °F (36.6 °C)-98.8 °F (37.1 °C)] 98.4 °F (36.9 °C)  Pulse:  [] 91  Resp:  [12-22] 16  SpO2:  [90 %-100 %] 93 %  BP: (112-194)/(55-72) 115/55     Weight: 93.9 kg (207 lb)  Body mass index is 32.42 kg/(m^2).    Intake/Output - Last 3 Shifts       05/16 0700 - 05/17 0659 05/17 0700 - 05/18 0659 05/18 0700 - 05/19 0659    P.O. 360      I.V. (mL/kg) 1000 (10.7) 1519.3 (16.2)     IV Piggyback 250 700     Total Intake(mL/kg) 1610 (17.1) 2219.3 (23.6)     Urine (mL/kg/hr)  0 (0)     Other  25 (0)     Stool  0 (0)     Total Output   25      Net +1610 +2194.3             Urine Occurrence 3 x 6 x     Stool Occurrence  0 x           Physical Exam   Constitutional: No distress.   overweight   Neck: Normal range of motion. Neck supple.   Cardiovascular: Normal rate and regular rhythm.    Pulmonary/Chest: Effort normal and breath sounds normal.   Abdominal: Soft. Bowel sounds are normal.   Skin:   Wound vac to bilateral axilla and left groin   Vitals reviewed.      Significant Labs:  CBC:   Recent Labs  Lab 05/18/17  0500   WBC 6.81   RBC 3.20*   HGB 11.2*   HCT 33.3*      *   MCH 35.0*   MCHC 33.6     BMP:   Recent Labs  Lab 05/17/17  0559 05/18/17  0500    98    139   K 4.0 3.9    106   CO2 25 28   BUN 9 7   CREATININE 0.9 0.8   CALCIUM 9.0 8.5*   MG 2.3  --      CMP:   Recent Labs  Lab 05/16/17  1720  05/18/17  0500     < > 98   CALCIUM 9.3  < > 8.5*   ALBUMIN 3.7  --   --    PROT 7.4  --   --      < > 139   K 4.0  < > 3.9   CO2 24  < > 28     < > 106   BUN 10  < > 7   CREATININE 1.0  < > 0.8   ALKPHOS 84  --   --    ALT 13  --   --    AST 15  --   --    BILITOT 0.2  --   --    < > = values in this interval not displayed.    Significant  Diagnostics:  none    Assessment/Plan:     * Abscess of axilla, right   area excised and wuond vac placed  Wound vac change in the OR tomorow    Malignant neoplasm of overlapping sites of right female breast  Will need to restart Ibrance on Monday as infection should be clean and it will take several week for the wounds to hear or be ready for a skin graft    Abscess of left groin  area excised and wuond vac placed  Wound vac change in the OR tomorow      Hidradenitis suppurativa of left axilla  area excised and wuond vac placed  Wound vac change in the OR tomorow        Rodger Wahl MD  General Surgery  [facility]

## 2017-05-18 NOTE — ASSESSMENT & PLAN NOTE
Will need to restart Ibrance on Monday as infection should be clean and it will take several week for the wounds to hear or be ready for a skin graft

## 2017-05-18 NOTE — PROGRESS NOTES
Hematology/Oncology Consult F/U    Reason for consultation: Management of hidradenitis, abscesses in patient with metastatic breast cancer     [facility] Diagnosis: Metastatic breast cancer, ER+/IA+/Her2 negative     Prior Treatment: None     Current Treatment: Ibrance + Letrozole     Chief Complaint: Abscess of axilla, right    HPI:    Josey Flores is a 46 y.o. female with metastatic breast cancer on chemotherapy admitted for management of multiple abscesses related to hidradenitis. She has reportedly had recurrent problems with hidradenitis for several years. She has been managed with outpatient antibiotics in the past. However, she was evaluated on 5/16 in [facility] clinic by Dr. Delong with complaint of worsening pain in her right axilla and left groin. She was referred for admission for surgical I&D of these abscesses. She was started on IV Cefepime and Linezolid. She was evaluated by surgery and underwent excision of skin, subcutaneous tissue and lymph nodes in the R axilla and L groin with wound vac placement today. She states she currently has a great deal of pain in the R axilla, but is doing well otherwise. No fevers.     Today, patient is afebrile. She denies shortness of breath, n/v. She states her right axilla and shoulder are hurting quite a bit.       Review of patient's allergies indicates:   Allergen Reactions    Vancomycin analogues Itching       Medications:      Current Facility-Administered Medications   Medication    acetaminophen (10 mg/mL) injection 1,000 mg    albuterol sulfate nebulizer solution 2.5 mg    alprazolam tablet 0.5 mg    aluminum-magnesium hydroxide-simethicone 200-200-20 mg/5 mL suspension 30 mL    citalopram tablet 20 mg    dextromethorphan-guaifenesin  mg/5 ml liquid 10 mL    dextrose 50% injection 12.5 g    dextrose 50% injection 25 g    diphenhydrAMINE injection 12.5 mg    enoxaparin injection 40 mg    [START ON 5/23/2017] ergocalciferol capsule 50,000  Units    glucagon (human recombinant) injection 1 mg    glucose chewable tablet 16 g    glucose chewable tablet 24 g    lactated ringers infusion    linezolid 600mg/300ml IVPB 600 mg    metoclopramide HCl injection 5 mg    morphine injection 4 mg    multivitamin tablet 1 tablet    ondansetron disintegrating tablet 8 mg    ondansetron injection 4 mg    oxycodone-acetaminophen  mg per tablet 1 tablet    oxycodone-acetaminophen 5-325 mg per tablet 1 tablet    pantoprazole EC tablet 40 mg    sodium chloride 0.9% injection 3 mL       VITALS (Last 24H):  Temp:  [97.8 °F (36.6 °C)-98.4 °F (36.9 °C)]   Pulse:  [72-91]   Resp:  [12-20]   BP: (112-134)/(55-76)   SpO2:  [90 %-97 %]     INTAKE & OUTPUT (Last 24H):    Intake/Output Summary (Last 24 hours) at 05/18/17 1703  Last data filed at 05/18/17 1400   Gross per 24 hour   Intake          2239.33 ml   Output               25 ml   Net          2214.33 ml     REVIEW OF SYSTEMS:        Constitutional: Negative for fever, chills, weight loss, malaise/fatigue and diaphoresis.   HENT: Negative for hearing loss, ear pain, nosebleeds, congestion, sore throat, neck pain, tinnitus and ear discharge.   Eyes: Negative for blurred vision, double vision, photophobia, pain, discharge and redness.   Respiratory: Negative for cough, hemoptysis, sputum production, shortness of breath, wheezing and stridor.   Cardiovascular: Negative for chest pain, palpitations, orthopnea, claudication, leg swelling and PND.   Gastrointestinal: Negative for heartburn, nausea, vomiting, abdominal pain, diarrhea, constipation, blood in stool and melena.   Genitourinary: Negative for dysuria, urgency, frequency, hematuria and flank pain.   Musculoskeletal: Negative for myalgias, back pain, joint pain and falls. Right axillary post-op pain  Skin: Negative for itching and rash.   Neurological: Negative for dizziness, tingling, tremors, sensory change, speech change, focal weakness, seizures,  loss of consciousness, weakness and headaches.   Endo/Heme/Allergies: Negative for environmental allergies and polydipsia. Does not bruise/bleed easily.   Psychiatric/Behavioral: Negative for depression, suicidal ideas, hallucinations, memory loss and substance abuse. The patient is not nervous/anxious and does not have insomnia.   All 14 systems reviewed and negative except as noted above.      PHYSICAL EXAM:    Constitutional: Appears well-developed, well-nourished and in no acute distress. Patient is oriented to person, place, and time. Obese  HEENT: Normocephalic and atraumatic. No maxillary sinus tenderness. External auditory canals clear and TMs intact without lesions. Nasal and oral mucosal membranes moist. Normal dentition and gums.   Neck: Neck supple no mass.   Cardiovascular: Normal rate and regular rhythm. S1 S2 appreciated by ascultation  Pulmonary/Chest: Effort normal and clear to auscultation bilaterally. No respiratory distress.   Abdomen: Soft. Non-tender and non-distended. Bowel     Laboratory Data:    Lab Results   Component Value Date    WBC 6.81 05/18/2017    HGB 11.2 (L) 05/18/2017    HCT 33.3 (L) 05/18/2017     (H) 05/18/2017     05/18/2017         Recent Labs  Lab 05/18/17  0500   GLU 98      K 3.9      CO2 28   BUN 7   CREATININE 0.8   CALCIUM 8.5*       Lab Results   Component Value Date    ALT 13 05/16/2017    AST 15 05/16/2017    ALKPHOS 84 05/16/2017    BILITOT 0.2 05/16/2017       No results found for: IRON, TIBC, FERRITIN, SATURATEDIRO  No results found for: [id number]  No results found for: FOLATE  Lab Results   Component Value Date    TSH 0.605 01/25/2017         Lab Results   Component Value Date    APTT 28.6 05/16/2017     Lab Results   Component Value Date    INR 1.0 05/17/2017    INR 1.0 05/16/2017    INR 1.0 11/03/2016         Radiology:  Reviewed and noted in plan where applicable. Please see chart for full radiology data.      ASSESSMENT/PLAN:   46  y/o female with metastatic breast cancer admitted for surgical management of multiple abscess/hidradenitis.     1. Metastatic breast cancer: On Letrozole and Ibrance. She started on Ibrance in 2/2017 and has completed 4 cycles thus far. She can continue on Letrozole at this time, but Ibrance will be held until her acute infection is resolved.   -- Holding Ibrance and continue Letrozole.  -- Patient's family must bring in patient's home supply of Letrozole for use in hospital     2. Right axillary and left inguinal abscesses: s/p surgical excision of skin, surrounding subcutaneous tissue - currently with wound vac in place.  -- Continue IV Cefepime + Linezolid  -- Surgery to take patient back to OR in am.     Thank you for allowing us to participate in the care of this patent.      Anjana Pompa M.D.  Hematology Oncology

## 2017-05-18 NOTE — SUBJECTIVE & OBJECTIVE
Interval History:     Review of Systems   Constitutional: Negative for appetite change, chills, fatigue and fever.   HENT: Negative.    Eyes: Negative for pain and redness.   Respiratory: Negative for cough, shortness of breath and wheezing.    Cardiovascular: Negative for chest pain and leg swelling.   Gastrointestinal: Negative for abdominal pain, diarrhea, nausea and vomiting.   Genitourinary: Negative for dysuria and frequency.   Musculoskeletal: Positive for myalgias.   Skin: Positive for wound.   Neurological: Negative for dizziness, light-headedness and headaches.   Psychiatric/Behavioral: Negative for confusion.     Objective:     Vital Signs (Most Recent):  Temp: 98.4 °F (36.9 °C) (05/18/17 0700)  Pulse: 91 (05/18/17 0700)  Resp: 16 (05/18/17 0700)  BP: (!) 115/55 (05/18/17 0700)  SpO2: (!) 93 % (05/18/17 0700) Vital Signs (24h Range):  Temp:  [97.8 °F (36.6 °C)-98.8 °F (37.1 °C)] 98.4 °F (36.9 °C)  Pulse:  [] 91  Resp:  [12-22] 16  SpO2:  [90 %-100 %] 93 %  BP: (112-194)/(55-72) 115/55     Weight: 93.9 kg (207 lb)  Body mass index is 32.42 kg/(m^2).    Intake/Output Summary (Last 24 hours) at 05/18/17 1128  Last data filed at 05/18/17 0600   Gross per 24 hour   Intake          2219.33 ml   Output               25 ml   Net          2194.33 ml      Physical Exam   Constitutional: She is oriented to person, place, and time. She appears well-developed and well-nourished. No distress.   overweight   HENT:   Head: Normocephalic and atraumatic.   Eyes: Conjunctivae are normal.   Neck: Normal range of motion. Neck supple.   Cardiovascular: Normal rate, regular rhythm and normal heart sounds.    Pulmonary/Chest: Effort normal and breath sounds normal.   Abdominal: Soft. Bowel sounds are normal.   Musculoskeletal: Normal range of motion.   Neurological: She is alert and oriented to person, place, and time.   Skin:   Wound vac to bilateral axilla and left groin   Psychiatric: She has a normal mood and affect.  Her behavior is normal.   Nursing note and vitals reviewed.      Significant Labs:   Blood Culture:   Recent Labs  Lab 05/16/17  1655 05/16/17  1720   LABBLOO No Growth to date  No Growth to date No Growth to date  No Growth to date     CBC:   Recent Labs  Lab 05/16/17  1720 05/17/17  0559 05/18/17  0500   WBC 8.30 7.42 6.81   HGB 12.8 11.9* 11.2*   HCT 36.1* 34.7* 33.3*    225 232     CMP:   Recent Labs  Lab 05/16/17  1720 05/17/17  0559 05/18/17  0500    138 139   K 4.0 4.0 3.9    104 106   CO2 24 25 28    101 98   BUN 10 9 7   CREATININE 1.0 0.9 0.8   CALCIUM 9.3 9.0 8.5*   PROT 7.4  --   --    ALBUMIN 3.7  --   --    BILITOT 0.2  --   --    ALKPHOS 84  --   --    AST 15  --   --    ALT 13  --   --    ANIONGAP 9 9 5*   EGFRNONAA >60 >60 >60     All pertinent labs within the past 24 hours have been reviewed.    Significant Imaging: I have reviewed all pertinent imaging results/findings within the past 24 hours.

## 2017-05-18 NOTE — PHYSICIAN QUERY
"PT Name: Josey Flores  MR #: [id number]    Physician Query Form - Procedure Clarification     CDS/: Donita Serrano RN               Contact information:[email or web address]  This form is a permanent document in the medical record.     Query Date: May 18, 2017  By submitting this query, we are merely seeking further clarification of documentation. Please utilize your independent clinical judgment when addressing the question(s) below.    The Medical record contains the following:     Indicators       Supporting Clinical Findings   Location in Medical Record   x Documentation of "Debridement"   Procedure(s) (LRB):  DEBRIDEMENT (Bilateral)     The area of the hidradenitis and infection of the right axilla was outlined with a marker. The skin was then incised along this line using a scalpel blade. The skin and underlying subcutaneous tissue/subcutaneous fat was then sharply removed using electrocautery    Attention was then turned to the left axilla where once again the area of chronically infected skin/hidradenitis was outlined with a working pain. The skin was sharply incised along the line. The skin and underlying subcutaneous tissue last subcutaneous fat was then sharply removed using electrocautery    Attention was then turned to the area of infection/hidradenitis of the left groin. Once again the area was outlined with a marking pen. The skin was incised. The skin and underlying cutaneous tissue/fat was excised sharply using the electrocautery 5/17 Operative Note      5/17 Operative Note                        5/17 Operative Note                          5/17 Operative Note    Documentation of "I & D"     x EBL = Estimated Blood Loss (EBL): * 75 ML's 5/17 Operative Note    Other:       Excisional debridement is a surgical removal of  nonvitalized tissue, necrosis or slough. The use of a sharp instrument does not always indicate that an excisional debridement was performed.  Non excisional " debridement is the scraping away or removal of loose tissue fragments.    Provider, please specify type of procedure(s) performed:    [ x ] Excisional Debridement    * Site: (Specify)_____________________________________      [  ] Non Excisional Debridement     [  ] Other Procedure (Specify) ________________________________    [  ] Clinically Undetermined

## 2017-05-18 NOTE — PLAN OF CARE
Problem: Patient Care Overview  Goal: Plan of Care Review  Outcome: Ongoing (interventions implemented as appropriate)  Pt remained free of injury during shift, stable condition, pain adequately controlled with PRN and scheduled medications, PCA discontinued per patient request and MD order, receiving antibiotics, wound vac set to continuous 125mmHg therapy setting with serosanguinous drainage and seal intact with no errors, dressings CDI, receiving IV fluids, on cardiac monitoring with NSR, tolerating diet well, no acute distress, and will continue to monitor. 24hr chart review performed.

## 2017-05-18 NOTE — PLAN OF CARE
Consult noted to arrange wound vac (with wound vac changes MW) and  SN services for patient.  Reviewed documentation and noted that patient has three wound sites (bilateral axilla and left groin).  Met with patient in room and discussed possible discharge options with her.  Patient receptive to any discharge option and indicates that wound vac preference is [facility] and outpatient wound clinic preference is [facility].  Patient indicates having no preference should she require LTAC or NH level of care post-hospitalization.  Patient Preference Form signed reflecting these preferences; and signed form placed in patient's blue folder.     Contacted Rosanna with [facility] ([phone]) who indicates that they accept patient's insurance and are willing to review patient information to see if they can accept patient.  [facility]  , Jolynn LANG, to fax patient information to [facility] tomorrow at fax ([phone]).  Contacted Saira with [facility] who states that they may also be able to accept patient; however, would need outpatient wound clinic to assist with wound vac changes.  Due to patient stating that she plans on remaining in [facility] post-hospitalization, contacted [facility] ([phone]) and determined that they accept patient's insurance.  Along with completed referral request form, faxed it and patient information to [phone] to complete outpatient wound clinic referral.  Also, due to hospitalist indicating that patient may require placement at a facility post-hospitalization, contacted Keira at [facility] in [place] and determined that they accept patient's insurance and are willing to review patient's information as well.   Also, contacted [facility] and spoke with Heather who indicates that they are currently unable to accept any Medicaid patients due to their current Medicaid patients' acuity level  being to high.  Contacted Kyara with [facility] who states that they are willing to review patient information and determine whether they may be able to accept.  Patient information faxed, via [employer], to [facility] [place] or [facility].    Also, placed [facility] Wound Vac Form in patient's blue folder and requested that surgeon complete it.

## 2017-05-18 NOTE — CONSULTS
Hematology/Oncology Consult Note    Consulting Physician: Dr. Phoenix Parish, Emergency Dept    Reason for consultation: Management of hidradenitis, abscesses in patient with metastatic breast cancer    [facility] Diagnosis: Metastatic breast cancer, ER+/MD+/Her2 negative    Prior Treatment: None    Current Treatment: Ibrance + Letrozole    Chief Complaint:  Abscess of axilla, right    HPI:    Josey Flores is a 46 y.o. female with metastatic breast cancer on chemotherapy admitted for management of multiple abscesses related to hidradenitis. She has reportedly had recurrent problems with hidradenitis for several years. She has been managed with outpatient antibiotics in the past. However, she was evaluated on 5/16 in [facility] clinic by Dr. Delong with complaint of worsening pain in her right axilla and left groin. She was referred for admission for surgical I&D of these abscesses. She was started on IV Cefepime and Linezolid. She was evaluated by surgery and underwent excision of skin, subcutaneous tissue and lymph nodes in the R axilla and L groin with wound vac placement today. She states she currently has a great deal of pain in the R axilla, but is doing well otherwise. No fevers.     PAST MEDICAL HISTORY:   1. HSIL on pap smear s/p LEEP     SURGICAL HISTORY:   1. Tonsillectomy and adenoidectomy  2. Appendectomy  3. Cholecystectomy  4. D&C     FAMILY HISTORY: She reports that her maternal aunt had lung cancer in her 70s. She used to smoke cigarettes. Her maternal cousin had lung cancer in her 60s. She used to smoke cigarettes. She denies any other immediate family members with cancer or bleeding/clotting disorders.     SOCIAL HISTORY: She reports a 45+ pack year smoking history and currently smokes one and a half packs of cigarettes a day. She drinks wine occasionally. She denies any history of recreational drug use. She is engaged and has 2 sons. She works for [employer]. She lives in [place]  [place].     Review of patient's allergies indicates:   Allergen Reactions    Vancomycin analogues Itching       Medications:      Current Facility-Administered Medications   Medication    acetaminophen (10 mg/mL) injection 1,000 mg    albuterol sulfate nebulizer solution 2.5 mg    alprazolam tablet 0.5 mg    aluminum-magnesium hydroxide-simethicone 200-200-20 mg/5 mL suspension 30 mL    citalopram tablet 20 mg    dextromethorphan-guaifenesin  mg/5 ml liquid 10 mL    dextrose 50% injection 12.5 g    dextrose 50% injection 25 g    diphenhydrAMINE injection 12.5 mg    [START ON 5/18/2017] enoxaparin injection 40 mg    [START ON 5/23/2017] ergocalciferol capsule 50,000 Units    glucagon (human recombinant) injection 1 mg    glucose chewable tablet 16 g    glucose chewable tablet 24 g    HYDROmorphone PCA in 0.9 % NaCl 6 Mg/30 mL (0.2 mg/mL)    lactated ringers infusion    linezolid 600mg/300ml IVPB 600 mg    metoclopramide HCl injection 5 mg    multivitamin tablet 1 tablet    naloxone 0.4 mg/mL injection 0.02 mg    ondansetron disintegrating tablet 8 mg    ondansetron disintegrating tablet 8 mg    ondansetron injection 4 mg    pantoprazole EC tablet 40 mg    sodium chloride 0.9% injection 3 mL       VITALS (Last 24H):  Temp:  [97.7 °F (36.5 °C)-98.8 °F (37.1 °C)]   Pulse:  []   Resp:  [16-22]   BP: ()/(59-73)   SpO2:  [92 %-100 %]     INTAKE & OUTPUT (Last 24H):    Intake/Output Summary (Last 24 hours) at 05/17/17 1921  Last data filed at 05/17/17 1800   Gross per 24 hour   Intake          1925.33 ml   Output                0 ml   Net          1925.33 ml       REVIEW OF SYSTEMS:        Constitutional: Negative for fever, chills, weight loss, malaise/fatigue and diaphoresis.   HENT: Negative for hearing loss, ear pain, nosebleeds, congestion, sore throat, neck pain, tinnitus and ear discharge.    Eyes: Negative for blurred vision, double vision, photophobia, pain, discharge  and redness.   Respiratory: Negative for cough, hemoptysis, sputum production, shortness of breath, wheezing and stridor.    Cardiovascular: Negative for chest pain, palpitations, orthopnea, claudication, leg swelling and PND.   Gastrointestinal: Negative for heartburn, nausea, vomiting, abdominal pain, diarrhea, constipation, blood in stool and melena.   Genitourinary: Negative for dysuria, urgency, frequency, hematuria and flank pain.   Musculoskeletal: Negative for myalgias, back pain, joint pain and falls. Right axillary post-op pain  Skin: Negative for itching and rash.   Neurological: Negative for dizziness, tingling, tremors, sensory change, speech change, focal weakness, seizures, loss of consciousness, weakness and headaches.   Endo/Heme/Allergies: Negative for environmental allergies and polydipsia. Does not bruise/bleed easily.   Psychiatric/Behavioral: Negative for depression, suicidal ideas, hallucinations, memory loss and substance abuse. The patient is not nervous/anxious and does not have insomnia.    All 14 systems reviewed and negative except as noted above.     PHYSICAL EXAM:    Constitutional: Appears well-developed, well-nourished and in no acute distress.  Patient is oriented to person, place, and time. Obese  HEENT: Normocephalic and atraumatic. No maxillary sinus tenderness. External auditory canals clear and TMs intact without lesions. Nasal and oral mucosal membranes moist. Normal dentition and gums.   Neck: Neck supple no mass.   Cardiovascular: Normal rate and regular rhythm.  S1 S2 appreciated by ascultation  Pulmonary/Chest: Effort normal and clear to auscultation bilaterally. No respiratory distress.   Abdomen: Soft. Non-tender and non-distended. Bowel sounds are normal.   Neurological: Moves all extremities.  Skin: Warm and dry. No lesions. Wound vac in place in R axilla and L groin  Extremities: No clubbing cyanosis or edema.    Laboratory Data:    Lab Results   Component Value Date     WBC 7.42 05/17/2017    HGB 11.9 (L) 05/17/2017    HCT 34.7 (L) 05/17/2017     (H) 05/17/2017     05/17/2017         Recent Labs  Lab 05/17/17  0559         K 4.0      CO2 25   BUN 9   CREATININE 0.9   CALCIUM 9.0   MG 2.3       Lab Results   Component Value Date    ALT 13 05/16/2017    AST 15 05/16/2017    ALKPHOS 84 05/16/2017    BILITOT 0.2 05/16/2017       Radiology:  Reviewed and noted in plan where applicable. Please see chart for full radiology data.      ASSESSMENT/PLAN:   45 y/o female with metastatic breast cancer admitted for surgical management of multiple abscess/hidradenitis.    1. Metastatic breast cancer: On Letrozole and Ibrance. She started on Ibrance in 2/2017 and has completed 4 cycles thus far. She can continue on Letrozole at this time, but Ibrance will be held until her acute infection is resolved.   -- Hold Ibrance and continue Letrozole.  -- Patient's family must bring in patient's home supply of Letrozole for use in hospital    2. Right axillary and left inguinal abscesses: s/p surgical excision of skin, surrounding subcutaneous tissue - currently with wound vac in place.  -- Continue IV Cefepime + Linezolid for now      Thank you for allowing us to participate in the care of this patent.     Anjana Pompa M.D.  Hematology Oncology

## 2017-05-18 NOTE — CONSULTS
05/18/17 1320   WOCN Assessment   WOCN Total Time (mins) 30   Visit Date 05/18/17   Visit Time 1230   Consult Type New   WOCN Speciality Wound   WOCN List wound vac   Wound surgical   Number of Wounds 3   Intervention assessed   Teaching on-going;complication;discharge     Consulted on this 45 y/o F patient for management of her wound vac s/o I&D of Bilateral axilla and Left groin by Dr. Wahl.  Patient resting comfortably in bed at present, but requesting her PRN pain medication for her right arm pain.  Wound vac in place and functioning well at -125 mmHg low intensity suction.  Patient aware of POC including wound vac dressing changes in OR tomorrow, and myself to perform wound vac dressing changes on Monday.  Will continue to follow.    Reportable conditions for Patients utilizing Negative Pressure Wound Vac Therapy :  1.Loss of seal, raised foam, or wound drainage suddenly decreasing in amount or stopping   2.Bright red blood or rapid filling of the canister  3.Periwound erythema, heat, edema, pain, elevated temperature and white blood cell count, cloudy or foul-smelling wound drainage, excess bleeding,  macerated periwound skin  4.Wound drainage becoming foul smelling and cloudy  5. Inability to trouble shoot alarm for greater than two hours    Primary Nurse Assessment should include the following  1. Document system patency  2. Amount and description of wound fluid  3. Pain level  4. Tolerance of NPWT

## 2017-05-18 NOTE — SUBJECTIVE & OBJECTIVE
Interval History:mild right axillary pain    Medications:  Continuous Infusions:   lactated ringers 100 mL/hr at 05/18/17 0407     Scheduled Meds:   acetaminophen  1,000 mg Intravenous Q8H    citalopram  20 mg Oral Daily    enoxparin  40 mg Subcutaneous Q24H    [START ON 5/23/2017] ergocalciferol  50,000 Units Oral Q7 Days    linezolid 600mg/300ml  600 mg Intravenous Q12H    multivitamin  1 tablet Oral Daily    pantoprazole  40 mg Oral Daily     PRN Meds:albuterol sulfate, alprazolam, aluminum-magnesium hydroxide-simethicone, dextromethorphan-guaifenesin  mg/5 ml, dextrose 50%, dextrose 50%, diphenhydrAMINE, glucagon (human recombinant), glucose, glucose, metoclopramide HCl, morphine, ondansetron, ondansetron, oxycodone-acetaminophen, oxycodone-acetaminophen, sodium chloride 0.9%     Review of patient's allergies indicates:   Allergen Reactions    Vancomycin analogues Itching     Objective:     Vital Signs (Most Recent):  Temp: 98.4 °F (36.9 °C) (05/18/17 0700)  Pulse: 91 (05/18/17 0700)  Resp: 16 (05/18/17 0700)  BP: (!) 115/55 (05/18/17 0700)  SpO2: (!) 93 % (05/18/17 0700) Vital Signs (24h Range):  Temp:  [97.8 °F (36.6 °C)-98.8 °F (37.1 °C)] 98.4 °F (36.9 °C)  Pulse:  [] 91  Resp:  [12-22] 16  SpO2:  [90 %-100 %] 93 %  BP: (112-194)/(55-72) 115/55     Weight: 93.9 kg (207 lb)  Body mass index is 32.42 kg/(m^2).    Intake/Output - Last 3 Shifts       05/16 0700 - 05/17 0659 05/17 0700 - 05/18 0659 05/18 0700 - 05/19 0659    P.O. 360      I.V. (mL/kg) 1000 (10.7) 1519.3 (16.2)     IV Piggyback 250 700     Total Intake(mL/kg) 1610 (17.1) 2219.3 (23.6)     Urine (mL/kg/hr)  0 (0)     Other  25 (0)     Stool  0 (0)     Total Output   25      Net +1610 +2194.3             Urine Occurrence 3 x 6 x     Stool Occurrence  0 x           Physical Exam   Constitutional: No distress.   overweight   Neck: Normal range of motion. Neck supple.   Cardiovascular: Normal rate and regular rhythm.     Pulmonary/Chest: Effort normal and breath sounds normal.   Abdominal: Soft. Bowel sounds are normal.   Skin:   Wound vac to bilateral axilla and left groin   Vitals reviewed.      Significant Labs:  CBC:   Recent Labs  Lab 05/18/17  0500   WBC 6.81   RBC 3.20*   HGB 11.2*   HCT 33.3*      *   MCH 35.0*   MCHC 33.6     BMP:   Recent Labs  Lab 05/17/17  0559 05/18/17  0500    98    139   K 4.0 3.9    106   CO2 25 28   BUN 9 7   CREATININE 0.9 0.8   CALCIUM 9.0 8.5*   MG 2.3  --      CMP:   Recent Labs  Lab 05/16/17  1720  05/18/17  0500     < > 98   CALCIUM 9.3  < > 8.5*   ALBUMIN 3.7  --   --    PROT 7.4  --   --      < > 139   K 4.0  < > 3.9   CO2 24  < > 28     < > 106   BUN 10  < > 7   CREATININE 1.0  < > 0.8   ALKPHOS 84  --   --    ALT 13  --   --    AST 15  --   --    BILITOT 0.2  --   --    < > = values in this interval not displayed.    Significant Diagnostics:  none

## 2017-05-18 NOTE — PROGRESS NOTES
Referrals sent to [facility] and [facility] in [place] through [facility].

## 2017-05-19 ENCOUNTER — TELEPHONE (OUTPATIENT)
Dept: RADIOLOGY | Facility: HOSPITAL | Age: 47
End: 2017-05-19

## 2017-05-19 ENCOUNTER — TELEPHONE (OUTPATIENT)
Dept: HEMATOLOGY/ONCOLOGY | Facility: CLINIC | Age: 47
End: 2017-05-19

## 2017-05-19 ENCOUNTER — ANESTHESIA (OUTPATIENT)
Dept: SURGERY | Facility: HOSPITAL | Age: 47
DRG: 571 | End: 2017-05-19
Payer: MEDICAID

## 2017-05-19 PROBLEM — L02.214 ABSCESS OF LEFT GROIN: Status: RESOLVED | Noted: 2017-05-16 | Resolved: 2017-05-19

## 2017-05-19 PROBLEM — L02.411 ABSCESS OF AXILLA, RIGHT: Status: RESOLVED | Noted: 2017-05-16 | Resolved: 2017-05-19

## 2017-05-19 PROBLEM — L73.2 HIDRADENITIS SUPPURATIVA OF LEFT AXILLA: Status: RESOLVED | Noted: 2017-05-17 | Resolved: 2017-05-19

## 2017-05-19 PROBLEM — L73.2 HIDRADENITIS: Status: ACTIVE | Noted: 2017-05-19

## 2017-05-19 PROCEDURE — 37000008 HC ANESTHESIA 1ST 15 MINUTES: Performed by: SURGERY

## 2017-05-19 PROCEDURE — 63600175 PHARM REV CODE 636 W HCPCS: Performed by: INTERNAL MEDICINE

## 2017-05-19 PROCEDURE — 94761 N-INVAS EAR/PLS OXIMETRY MLT: CPT

## 2017-05-19 PROCEDURE — 63600175 PHARM REV CODE 636 W HCPCS: Performed by: ANESTHESIOLOGY

## 2017-05-19 PROCEDURE — 36000704 HC OR TIME LEV I 1ST 15 MIN: Performed by: SURGERY

## 2017-05-19 PROCEDURE — 99900035 HC TECH TIME PER 15 MIN (STAT)

## 2017-05-19 PROCEDURE — 25000003 PHARM REV CODE 250: Performed by: SURGERY

## 2017-05-19 PROCEDURE — 37000009 HC ANESTHESIA EA ADD 15 MINS: Performed by: SURGERY

## 2017-05-19 PROCEDURE — 71000033 HC RECOVERY, INTIAL HOUR: Performed by: SURGERY

## 2017-05-19 PROCEDURE — 25000003 PHARM REV CODE 250: Performed by: NURSE ANESTHETIST, CERTIFIED REGISTERED

## 2017-05-19 PROCEDURE — 36000705 HC OR TIME LEV I EA ADD 15 MIN: Performed by: SURGERY

## 2017-05-19 PROCEDURE — 63600175 PHARM REV CODE 636 W HCPCS: Performed by: NURSE ANESTHETIST, CERTIFIED REGISTERED

## 2017-05-19 PROCEDURE — 97606 NEG PRS WND THER DME>50 SQCM: CPT | Mod: ,,, | Performed by: SURGERY

## 2017-05-19 PROCEDURE — 21400001 HC TELEMETRY ROOM

## 2017-05-19 PROCEDURE — 25000003 PHARM REV CODE 250: Performed by: INTERNAL MEDICINE

## 2017-05-19 PROCEDURE — 99232 SBSQ HOSP IP/OBS MODERATE 35: CPT | Mod: ,,, | Performed by: INTERNAL MEDICINE

## 2017-05-19 PROCEDURE — 94799 UNLISTED PULMONARY SVC/PX: CPT

## 2017-05-19 RX ORDER — MIDAZOLAM HYDROCHLORIDE 1 MG/ML
INJECTION, SOLUTION INTRAMUSCULAR; INTRAVENOUS
Status: DISCONTINUED | OUTPATIENT
Start: 2017-05-19 | End: 2017-05-19

## 2017-05-19 RX ORDER — DEXAMETHASONE SODIUM PHOSPHATE 4 MG/ML
INJECTION, SOLUTION INTRA-ARTICULAR; INTRALESIONAL; INTRAMUSCULAR; INTRAVENOUS; SOFT TISSUE
Status: DISCONTINUED | OUTPATIENT
Start: 2017-05-19 | End: 2017-05-19

## 2017-05-19 RX ORDER — METOCLOPRAMIDE HYDROCHLORIDE 5 MG/ML
10 INJECTION INTRAMUSCULAR; INTRAVENOUS EVERY 10 MIN PRN
Status: DISCONTINUED | OUTPATIENT
Start: 2017-05-19 | End: 2017-05-19 | Stop reason: HOSPADM

## 2017-05-19 RX ORDER — HYDROMORPHONE HYDROCHLORIDE 2 MG/ML
0.2 INJECTION, SOLUTION INTRAMUSCULAR; INTRAVENOUS; SUBCUTANEOUS EVERY 5 MIN PRN
Status: DISCONTINUED | OUTPATIENT
Start: 2017-05-19 | End: 2017-05-19 | Stop reason: HOSPADM

## 2017-05-19 RX ORDER — DIPHENHYDRAMINE HYDROCHLORIDE 50 MG/ML
12.5 INJECTION INTRAMUSCULAR; INTRAVENOUS EVERY 6 HOURS PRN
Status: DISCONTINUED | OUTPATIENT
Start: 2017-05-19 | End: 2017-05-19 | Stop reason: HOSPADM

## 2017-05-19 RX ORDER — OXYCODONE AND ACETAMINOPHEN 5; 325 MG/1; MG/1
1 TABLET ORAL
Status: DISCONTINUED | OUTPATIENT
Start: 2017-05-19 | End: 2017-05-19 | Stop reason: HOSPADM

## 2017-05-19 RX ORDER — PROPOFOL 10 MG/ML
VIAL (ML) INTRAVENOUS
Status: DISCONTINUED | OUTPATIENT
Start: 2017-05-19 | End: 2017-05-19

## 2017-05-19 RX ORDER — FENTANYL CITRATE 50 UG/ML
INJECTION, SOLUTION INTRAMUSCULAR; INTRAVENOUS
Status: DISCONTINUED | OUTPATIENT
Start: 2017-05-19 | End: 2017-05-19

## 2017-05-19 RX ORDER — ONDANSETRON 2 MG/ML
INJECTION INTRAMUSCULAR; INTRAVENOUS
Status: DISCONTINUED | OUTPATIENT
Start: 2017-05-19 | End: 2017-05-19

## 2017-05-19 RX ORDER — SODIUM CHLORIDE, SODIUM LACTATE, POTASSIUM CHLORIDE, CALCIUM CHLORIDE 600; 310; 30; 20 MG/100ML; MG/100ML; MG/100ML; MG/100ML
INJECTION, SOLUTION INTRAVENOUS CONTINUOUS PRN
Status: DISCONTINUED | OUTPATIENT
Start: 2017-05-19 | End: 2017-05-19

## 2017-05-19 RX ORDER — SODIUM CHLORIDE 0.9 % (FLUSH) 0.9 %
3 SYRINGE (ML) INJECTION
Status: DISCONTINUED | OUTPATIENT
Start: 2017-05-19 | End: 2017-05-25 | Stop reason: HOSPADM

## 2017-05-19 RX ORDER — MEPERIDINE HYDROCHLORIDE 50 MG/ML
12.5 INJECTION INTRAMUSCULAR; INTRAVENOUS; SUBCUTANEOUS ONCE AS NEEDED
Status: DISCONTINUED | OUTPATIENT
Start: 2017-05-19 | End: 2017-05-19 | Stop reason: HOSPADM

## 2017-05-19 RX ORDER — FENTANYL CITRATE 50 UG/ML
25 INJECTION, SOLUTION INTRAMUSCULAR; INTRAVENOUS EVERY 5 MIN PRN
Status: DISCONTINUED | OUTPATIENT
Start: 2017-05-19 | End: 2017-05-19 | Stop reason: HOSPADM

## 2017-05-19 RX ADMIN — MORPHINE SULFATE 4 MG: 4 INJECTION, SOLUTION INTRAMUSCULAR; INTRAVENOUS at 11:05

## 2017-05-19 RX ADMIN — MORPHINE SULFATE 4 MG: 4 INJECTION, SOLUTION INTRAMUSCULAR; INTRAVENOUS at 03:05

## 2017-05-19 RX ADMIN — SODIUM CHLORIDE, SODIUM LACTATE, POTASSIUM CHLORIDE, AND CALCIUM CHLORIDE: .6; .31; .03; .02 INJECTION, SOLUTION INTRAVENOUS at 08:05

## 2017-05-19 RX ADMIN — OXYCODONE HYDROCHLORIDE AND ACETAMINOPHEN 1 TABLET: 10; 325 TABLET ORAL at 06:05

## 2017-05-19 RX ADMIN — FENTANYL CITRATE 25 MCG: 50 INJECTION INTRAMUSCULAR; INTRAVENOUS at 02:05

## 2017-05-19 RX ADMIN — PROPOFOL 150 MG: 10 INJECTION, EMULSION INTRAVENOUS at 01:05

## 2017-05-19 RX ADMIN — SODIUM CHLORIDE, SODIUM LACTATE, POTASSIUM CHLORIDE, AND CALCIUM CHLORIDE: 600; 310; 30; 20 INJECTION, SOLUTION INTRAVENOUS at 01:05

## 2017-05-19 RX ADMIN — LINEZOLID 600 MG: 600 INJECTION, SOLUTION INTRAVENOUS at 03:05

## 2017-05-19 RX ADMIN — FENTANYL CITRATE 100 MCG: 50 INJECTION, SOLUTION INTRAMUSCULAR; INTRAVENOUS at 01:05

## 2017-05-19 RX ADMIN — ONDANSETRON 4 MG: 2 INJECTION, SOLUTION INTRAMUSCULAR; INTRAVENOUS at 01:05

## 2017-05-19 RX ADMIN — OXYCODONE HYDROCHLORIDE AND ACETAMINOPHEN 1 TABLET: 10; 325 TABLET ORAL at 05:05

## 2017-05-19 RX ADMIN — OXYCODONE HYDROCHLORIDE AND ACETAMINOPHEN 1 TABLET: 10; 325 TABLET ORAL at 10:05

## 2017-05-19 RX ADMIN — DEXAMETHASONE SODIUM PHOSPHATE 4 MG: 4 INJECTION, SOLUTION INTRA-ARTICULAR; INTRALESIONAL; INTRAMUSCULAR; INTRAVENOUS; SOFT TISSUE at 01:05

## 2017-05-19 RX ADMIN — MIDAZOLAM HYDROCHLORIDE 2 MG: 1 INJECTION, SOLUTION INTRAMUSCULAR; INTRAVENOUS at 01:05

## 2017-05-19 NOTE — ASSESSMENT & PLAN NOTE
- Continue IV Zyvox  - Blood cultures NGTD  Second I & D with wound vac change on 5/19/17 per Dr. Wahl  Tissue specimen sent to pathology from last procedure 5/17/17

## 2017-05-19 NOTE — TELEPHONE ENCOUNTER
----- Message from Unique Lang-Overs sent at 5/19/2017 10:49 AM CDT -----  Contact: Radiology  Hello,    This patient is scheduled for PET scan for Monday, May 5/22/2017. We will need to order her dose, will there be arranged transportation to bring her to the [facility] location for her appointment? Or should we reschedule her PET for Wednesday 5/24/2017?      Thank you,    Unique  Radiology Dept.

## 2017-05-19 NOTE — TRANSFER OF CARE
"Anesthesia Transfer of Care Note    Patient: Josey Flores    Procedure(s) Performed: Procedure(s) (LRB):  EXCHANGE-WOUND VAC (Bilateral)    Patient location: PACU    Anesthesia Type: general    Transport from OR: Transported from OR on room air with adequate spontaneous ventilation    Post pain: adequate analgesia    Post assessment: no apparent anesthetic complications    Post vital signs: stable    Level of consciousness: awake    Nausea/Vomiting: no nausea/vomiting    Complications: none    Transfer of care protocol was followed      Last vitals:   Visit Vitals    /75 (BP Location: Left arm, Patient Position: Lying, BP Method: Automatic)    Pulse 78    Temp 36.4 °C (97.6 °F) (Oral)    Resp 18    Ht 5' 7" (1.702 m)    Wt 93.9 kg (207 lb)    LMP  (LMP Unknown)    SpO2 97%    Breastfeeding No    BMI 32.42 kg/m2     "

## 2017-05-19 NOTE — PLAN OF CARE
Referral received by [facility].  [facility] will be here today to evaluate patient for possible placement

## 2017-05-19 NOTE — ASSESSMENT & PLAN NOTE
Wound VAC change in the operating room today.    Begin bedside wound VAC changes on Monday by the wound care nurse

## 2017-05-19 NOTE — SUBJECTIVE & OBJECTIVE
Interval History: Slight increase in pain compared to yesterday    Medications:  Continuous Infusions:   lactated ringers 75 mL/hr at 05/19/17 0832     Scheduled Meds:   citalopram  20 mg Oral Daily    enoxparin  40 mg Subcutaneous Q24H    [START ON 5/23/2017] ergocalciferol  50,000 Units Oral Q7 Days    linezolid 600mg/300ml  600 mg Intravenous Q12H    multivitamin  1 tablet Oral Daily    pantoprazole  40 mg Oral Daily     PRN Meds:albuterol sulfate, alprazolam, aluminum-magnesium hydroxide-simethicone, dextromethorphan-guaifenesin  mg/5 ml, dextrose 50%, dextrose 50%, diphenhydrAMINE, glucagon (human recombinant), glucose, glucose, metoclopramide HCl, morphine, ondansetron, ondansetron, oxycodone-acetaminophen, oxycodone-acetaminophen, sodium chloride 0.9%     Review of patient's allergies indicates:   Allergen Reactions    Vancomycin analogues Itching     Objective:     Vital Signs (Most Recent):  Temp: 97.6 °F (36.4 °C) (05/19/17 1210)  Pulse: 78 (05/19/17 1210)  Resp: 18 (05/19/17 1210)  BP: 110/75 (05/19/17 1210)  SpO2: 97 % (05/19/17 1210) Vital Signs (24h Range):  Temp:  [97.2 °F (36.2 °C)-98.4 °F (36.9 °C)] 97.6 °F (36.4 °C)  Pulse:  [77-85] 78  Resp:  [18-20] 18  SpO2:  [91 %-97 %] 97 %  BP: (106-134)/(53-75) 110/75     Weight: 93.9 kg (207 lb)  Body mass index is 32.42 kg/(m^2).    Intake/Output - Last 3 Shifts       05/17 0700 - 05/18 0659 05/18 0700 - 05/19 0659 05/19 0700 - 05/20 0659    P.O.  1080     I.V. (mL/kg) 1519.3 (16.2)      IV Piggyback 700 600     Total Intake(mL/kg) 2219.3 (23.6) 1680 (17.9)     Urine (mL/kg/hr) 0 (0) 0 (0)     Other 25 (0) 25 (0)     Stool 0 (0)      Total Output 25 25      Net +2194.3 +1655             Urine Occurrence 6 x 9 x 2 x    Stool Occurrence 0 x 1 x           Physical Exam   Constitutional: No distress.   Overweight   Eyes: Pupils are equal, round, and reactive to light. No scleral icterus.   Neck: Normal range of motion. Neck supple.    Cardiovascular: Normal rate, regular rhythm and normal heart sounds.    Pulmonary/Chest: Effort normal.   Abdominal: Soft. Bowel sounds are normal.   Musculoskeletal: She exhibits no edema.   Skin:   There is a wound VAC to the right and left axilla with no leakage  There is a wound VAC to the left groin with no leakage   Vitals reviewed.      Significant Labs:  CBC:   Recent Labs  Lab 05/18/17  0500   WBC 6.81   RBC 3.20*   HGB 11.2*   HCT 33.3*      *   MCH 35.0*   MCHC 33.6     BMP:   Recent Labs  Lab 05/17/17  0559 05/18/17  0500    98    139   K 4.0 3.9    106   CO2 25 28   BUN 9 7   CREATININE 0.9 0.8   CALCIUM 9.0 8.5*   MG 2.3  --      CMP:   Recent Labs  Lab 05/16/17  1720  05/18/17  0500     < > 98   CALCIUM 9.3  < > 8.5*   ALBUMIN 3.7  --   --    PROT 7.4  --   --      < > 139   K 4.0  < > 3.9   CO2 24  < > 28     < > 106   BUN 10  < > 7   CREATININE 1.0  < > 0.8   ALKPHOS 84  --   --    ALT 13  --   --    AST 15  --   --    BILITOT 0.2  --   --    < > = values in this interval not displayed.    Significant Diagnostics:  None

## 2017-05-19 NOTE — PROGRESS NOTES
Hematology/Oncology Consult F/U    Reason for consultation: Management of hidradenitis, abscesses in patient with metastatic breast cancer     [facility]: Metastatic breast cancer, ER+/OK+/Her2 negative     Prior Treatment: None     Current Treatment: Ibrance + Letrozole     Chief Complaint: Abscess of axilla, right    HPI:    Josey Flores is a 46 y.o. female with metastatic breast cancer on chemotherapy admitted for management of multiple abscesses related to hidradenitis. She has reportedly had recurrent problems with hidradenitis for several years. She has been managed with outpatient antibiotics in the past. However, she was evaluated on 5/16 in [facility] clinic by Dr. Delong with complaint of worsening pain in her right axilla and left groin. She was referred for admission for surgical I&D of these abscesses. She was started on IV Cefepime and Linezolid. She was evaluated by surgery and underwent excision of skin, subcutaneous tissue and lymph nodes in the R axilla and L groin with wound vac placement today. She states she currently has a great deal of pain in the R axilla, but is doing well otherwise. No fevers.     Interval History:  Patient remains afebrile. She denies shortness of breath, n/v. She appears more comfortable today and is awaiting surgery again today for wound vac change in OR.    Review of patient's allergies indicates:   Allergen Reactions    Vancomycin analogues Itching       Medications:      Current Facility-Administered Medications   Medication    albuterol sulfate nebulizer solution 2.5 mg    alprazolam tablet 0.5 mg    aluminum-magnesium hydroxide-simethicone 200-200-20 mg/5 mL suspension 30 mL    citalopram tablet 20 mg    dextromethorphan-guaifenesin  mg/5 ml liquid 10 mL    dextrose 50% injection 12.5 g    dextrose 50% injection 25 g    diphenhydrAMINE injection 12.5 mg    enoxaparin injection 40 mg    [START ON 5/23/2017] ergocalciferol capsule 50,000  Units    glucagon (human recombinant) injection 1 mg    glucose chewable tablet 16 g    glucose chewable tablet 24 g    lactated ringers infusion    linezolid 600mg/300ml IVPB 600 mg    metoclopramide HCl injection 5 mg    morphine injection 4 mg    multivitamin tablet 1 tablet    ondansetron disintegrating tablet 8 mg    ondansetron injection 4 mg    oxycodone-acetaminophen  mg per tablet 1 tablet    oxycodone-acetaminophen 5-325 mg per tablet 1 tablet    pantoprazole EC tablet 40 mg    sodium chloride 0.9% injection 3 mL       VITALS (Last 24H):  Temp:  [97.2 °F (36.2 °C)-98.4 °F (36.9 °C)]   Pulse:  [76-85]   Resp:  [18-20]   BP: (106-134)/(53-76)   SpO2:  [91 %-97 %]     INTAKE & OUTPUT (Last 24H):    Intake/Output Summary (Last 24 hours) at 05/19/17 0964  Last data filed at 05/19/17 0637   Gross per 24 hour   Intake             1680 ml   Output               25 ml   Net             1655 ml     REVIEW OF SYSTEMS:        Constitutional: Negative for fever, chills, weight loss, malaise/fatigue and diaphoresis.   HENT: Negative for hearing loss, ear pain, nosebleeds, congestion, sore throat, neck pain, tinnitus and ear discharge.   Eyes: Negative for blurred vision, double vision, photophobia, pain, discharge and redness.   Respiratory: Negative for cough, hemoptysis, sputum production, shortness of breath, wheezing and stridor.   Cardiovascular: Negative for chest pain, palpitations, orthopnea, claudication, leg swelling and PND.   Gastrointestinal: Negative for heartburn, nausea, vomiting, abdominal pain, diarrhea, constipation, blood in stool and melena.   Genitourinary: Negative for dysuria, urgency, frequency, hematuria and flank pain.   Musculoskeletal: Negative for myalgias, back pain, joint pain and falls. Right axillary post-op pain  Skin: Negative for itching and rash.   Neurological: Negative for dizziness, tingling, tremors, sensory change, speech change, focal weakness, seizures,  loss of consciousness, weakness and headaches.   Endo/Heme/Allergies: Negative for environmental allergies and polydipsia. Does not bruise/bleed easily.   Psychiatric/Behavioral: Negative for depression, suicidal ideas, hallucinations, memory loss and substance abuse. The patient is not nervous/anxious and does not have insomnia.   All 14 systems reviewed and negative except as noted above.      PHYSICAL EXAM:    Constitutional: Appears well-developed, well-nourished and in no acute distress. Patient is oriented to person, place, and time. Obese  HEENT: Normocephalic and atraumatic. No maxillary sinus tenderness. External auditory canals clear and TMs intact without lesions. Nasal and oral mucosal membranes moist. Normal dentition and gums.   Neck: Neck supple no mass.   Cardiovascular: Normal rate and regular rhythm. S1 S2 appreciated by ascultation  Pulmonary/Chest: Effort normal and clear to auscultation bilaterally. No respiratory distress.   Abdomen: Soft. Non-tender and non-distended. Bowel     Laboratory Data:    Lab Results   Component Value Date    WBC 6.81 05/18/2017    HGB 11.2 (L) 05/18/2017    HCT 33.3 (L) 05/18/2017     (H) 05/18/2017     05/18/2017       No results for input(s): GLU, NA, K, CL, CO2, BUN, CREATININE, CALCIUM, MG in the last 24 hours.    Lab Results   Component Value Date    ALT 13 05/16/2017    AST 15 05/16/2017    ALKPHOS 84 05/16/2017    BILITOT 0.2 05/16/2017       No results found for: IRON, TIBC, FERRITIN, SATURATEDIRO  No results found for: DTSVIPHF15  No results found for: FOLATE  Lab Results   Component Value Date    TSH 0.605 01/25/2017         Lab Results   Component Value Date    APTT 28.6 05/16/2017     Lab Results   Component Value Date    INR 1.0 05/17/2017    INR 1.0 05/16/2017    INR 1.0 11/03/2016         Radiology:  Reviewed and noted in plan where applicable. Please see chart for full radiology data.      ASSESSMENT/PLAN:   47 y/o female with metastatic  breast cancer admitted for surgical management of multiple abscess/hidradenitis.     1. Metastatic breast cancer: On Letrozole and Ibrance. She started on Ibrance in 2/2017 and has completed 4 cycles thus far. She can continue on Letrozole at this time, but Ibrance will be held until her acute infection is resolved.   -- Holding Ibrance and continue Letrozole.  -- Patient's family must bring in patient's home supply of Letrozole for use in hospital  -- Please call with questions.     2. Right axillary and left inguinal abscesses: s/p surgical excision of skin, surrounding subcutaneous tissue - currently with wound vac in place. Blood cultures negative.   -- On Linezolid  -- Surgery to take patient back to OR today for wound vac change.     Thank you for allowing us to participate in the care of this patent.      Anjana Pompa M.D.  Hematology Oncology

## 2017-05-19 NOTE — ASSESSMENT & PLAN NOTE
- Follows with Dr. Delong  -Cont  IV Zyvox  - Per patient she has Stage IV metastatic breast cancer to the bone and lymph system  - Currently takes po chemo daily  - Overall Prognosis is poor (1.5-2 years per patient)  - Will need to f/u with hemoc upon dc

## 2017-05-19 NOTE — ANESTHESIA POSTPROCEDURE EVALUATION
"Anesthesia Post Evaluation    Patient: Josey Flores    Procedure(s) Performed: Procedure(s) (LRB):  EXCHANGE-WOUND VAC (Bilateral)    Final Anesthesia Type: general  Patient location during evaluation: PACU  Patient participation: Yes- Able to Participate  Level of consciousness: awake and alert  Post-procedure vital signs: reviewed and stable  Pain management: adequate  Airway patency: patent  PONV status at discharge: No PONV  Anesthetic complications: no      Cardiovascular status: hemodynamically stable  Respiratory status: spontaneous ventilation  Hydration status: euvolemic  Follow-up not needed.        Visit Vitals    /71 (BP Location: Left arm, Patient Position: Lying, BP Method: Automatic)    Pulse 78    Temp 36.6 °C (97.9 °F) (Oral)    Resp 18    Ht 5' 7" (1.702 m)    Wt 93.9 kg (207 lb)    LMP  (LMP Unknown)    SpO2 96%    Breastfeeding No    BMI 32.42 kg/m2       Pain/Shraddha Score: Pain Assessment Performed: Yes (5/19/2017  2:45 PM)  Presence of Pain: complains of pain/discomfort (5/19/2017  2:55 PM)  Pain Rating Prior to Med Admin: 8 (5/19/2017  3:19 PM)  Pain Rating Post Med Admin: 3 (5/19/2017 11:51 AM)  Shraddha Score: 10 (5/19/2017  2:45 PM)      "

## 2017-05-19 NOTE — OP NOTE
[facility]  Surgery Department  Operative Note    SUMMARY     Date of Procedure: 5/19/2017     Procedure: Procedure(s) (LRB):  EXCHANGE-WOUND VAC (Bilateral)     Surgeon(s) and Role:     * Rodger Wahl MD - Primary    Assisting Surgeon: None    Pre-Operative Diagnosis: Open wound [T14.8]    Post-Operative Diagnosis: Post-Op Diagnosis Codes:     * Open wound [T14.8]    Anesthesia: General    Technical Procedures Used: Wound VAC change    Description of the Findings of the Procedure:     Is brought into the operating room placed in the operative table in spine position.  Laryngeal mask anesthesia was induced.  She was receiving antibiotics.  Pneumatic compression devices on the lower extremity.    A timeout was performed.    The right and left axillary wound vacs were removed.  The left groin wound VAC removed.  All wounds were healthy with no neural necrotic tissue.  The wounds were measured.    The right axilla is 20 x 12 x 1 cm deep the left axilla is 10 x 12 x 1 cm deep.  The left groin is a 6 x 4 x 3 cm deep.    All wounds were then dressed with black foam of the wound VAC.  Good seals were obtained.  Patient hard the procedure well    Significant Surgical Tasks Conducted by the Assistant(s), if Applicable: None    Complications: No    Estimated Blood Loss (EBL): 10 mL  IV fluids 700 ML's           Implants: * No implants in log *    Specimens:   Specimen     None                  Condition: Stable    Disposition: PACU - hemodynamically stable.    Attestation: I performed the procedure.

## 2017-05-19 NOTE — ANESTHESIA PREPROCEDURE EVALUATION
05/19/2017  Josey Flores is a 46 y.o., female.    Anesthesia Evaluation    I have reviewed the Patient Summary Reports.    I have reviewed the Nursing Notes.   I have reviewed the Medications.     Review of Systems  Anesthesia Hx:  No problems with previous Anesthesia  Denies Family Hx of Anesthesia complications.   Denies Personal Hx of Anesthesia complications.   Social:  Smoker    Hematology/Oncology:         -- Anemia: Current/Recent Cancer. Breast right   EENT/Dental:EENT/Dental Normal   Cardiovascular:   Exercise tolerance: good    Pulmonary:  Pulmonary Normal    Renal/:  Renal/ Normal     Hepatic/GI:  Hepatic/GI Normal    Musculoskeletal:  Musculoskeletal Normal    Neurological:  Neurology Normal    Endocrine:  Endocrine Normal    Dermatological:  Skin Normal    Psych:  Psychiatric Normal           Physical Exam  General:  Well nourished    Airway/Jaw/Neck:  Airway Findings: Mouth Opening: Normal Tongue: Normal  General Airway Assessment: Adult, Average  Mallampati: II  TM Distance: Normal, at least 6 cm      Dental:  Dental Findings: Edentulous   Chest/Lungs:  Chest/Lungs Findings: Clear to auscultation, Normal Respiratory Rate     Heart/Vascular:  Heart Findings: Rate: Normal  Rhythm: Regular Rhythm  Sounds: Normal        Mental Status:  Mental Status Findings:  Cooperative, Alert and Oriented         Anesthesia Plan  Type of Anesthesia, risks & benefits discussed:  Anesthesia Type:  general  Patient's Preference:   Intra-op Monitoring Plan:   Intra-op Monitoring Plan Comments:   Post Op Pain Control Plan:   Post Op Pain Control Plan Comments:   Induction:   IV  Beta Blocker:  Patient is not currently on a Beta-Blocker (No further documentation required).       Informed Consent: Patient understands risks and agrees with Anesthesia plan.  Questions answered. Anesthesia consent signed with  patient.  ASA Score: 2     Day of Surgery Review of History & Physical: I have interviewed and examined the patient. I have reviewed the patient's H&P dated: 5/19/17. There are no significant changes.  H&P update referred to the surgeon.         Ready For Surgery From Anesthesia Perspective.

## 2017-05-19 NOTE — PLAN OF CARE
Problem: Patient Care Overview  Goal: Plan of Care Review  Outcome: Ongoing (interventions implemented as appropriate)  Reviewed and discussed plan of care with patient.  No acute distress noted.  Pain managed adequately.  Safety and fall precautions reviewed and discussed.  Wound vac managed.  Will continue to monitor.

## 2017-05-19 NOTE — SUBJECTIVE & OBJECTIVE
Interval History:     Seen and examined prior to having repeat I & D. Had difficult time sleeping last night due to pain at the right axilla. She is taking Morphine prn with oxycodone for breakthrough. Family at bedside. Wound vac intact to 3 areas.     Review of Systems   Constitutional: Negative for appetite change, chills, fatigue and fever.   HENT: Negative.    Eyes: Negative for pain and redness.   Respiratory: Negative for cough, shortness of breath and wheezing.    Cardiovascular: Negative for chest pain and leg swelling.   Gastrointestinal: Negative for abdominal pain, diarrhea, nausea and vomiting.   Genitourinary: Negative for dysuria and frequency.   Musculoskeletal: Positive for myalgias.   Skin: Positive for wound.   Neurological: Negative for dizziness, light-headedness and headaches.   Psychiatric/Behavioral: Negative for confusion.     Objective:     Vital Signs (Most Recent):  Temp: 98.4 °F (36.9 °C) (05/19/17 0725)  Pulse: 78 (05/19/17 1000)  Resp: 18 (05/19/17 1000)  BP: (!) 106/53 (05/19/17 0725)  SpO2: 95 % (05/19/17 1000) Vital Signs (24h Range):  Temp:  [97.2 °F (36.2 °C)-98.4 °F (36.9 °C)] 98.4 °F (36.9 °C)  Pulse:  [77-85] 78  Resp:  [18-20] 18  SpO2:  [91 %-97 %] 95 %  BP: (106-134)/(53-75) 106/53     Weight: 93.9 kg (207 lb)  Body mass index is 32.42 kg/(m^2).    Intake/Output Summary (Last 24 hours) at 05/19/17 1319  Last data filed at 05/19/17 0637   Gross per 24 hour   Intake             1480 ml   Output               25 ml   Net             1455 ml      Physical Exam   Constitutional: She is oriented to person, place, and time. She appears well-developed and well-nourished. No distress.   overweight   HENT:   Head: Normocephalic and atraumatic.   Eyes: Conjunctivae are normal.   Neck: Normal range of motion. Neck supple.   Cardiovascular: Normal rate, regular rhythm and normal heart sounds.    Pulmonary/Chest: Effort normal and breath sounds normal.   Abdominal: Soft. Bowel sounds are  normal.   Musculoskeletal: Normal range of motion.   Neurological: She is alert and oriented to person, place, and time.   Skin:   Wound vac to bilateral axilla and left groin   Psychiatric: She has a normal mood and affect. Her behavior is normal.   Nursing note and vitals reviewed.      Significant Labs:   CBC:   Recent Labs  Lab 05/18/17  0500   WBC 6.81   HGB 11.2*   HCT 33.3*        CMP:   Recent Labs  Lab 05/18/17  0500      K 3.9      CO2 28   GLU 98   BUN 7   CREATININE 0.8   CALCIUM 8.5*   ANIONGAP 5*   EGFRNONAA >60     All pertinent labs within the past 24 hours have been reviewed.    Significant Imaging: I have reviewed all pertinent imaging results/findings within the past 24 hours.

## 2017-05-19 NOTE — ASSESSMENT & PLAN NOTE
Patient has undergone excision of hidradenitis of the right and left axilla as well as the left groin.  She will undergo wound VAC changes on Monday Wednesdays and Fridays.  Once a granulation bed has formed she will need skin grafts of the right and left axilla.  Left groin is likely close on its own

## 2017-05-19 NOTE — PLAN OF CARE
Problem: Patient Care Overview  Goal: Plan of Care Review  Outcome: Ongoing (interventions implemented as appropriate)  Cont. Wound vac to groin and bilat axilla. Fall precautions in place. Side rails up. Bed locked and low in position. Call light and personal items within reach. 24 hour order chart check completed. Pt educated on medication's side effects. Pt verbalized understanding.   Will continue to monitor.

## 2017-05-19 NOTE — PROGRESS NOTES
"[facility] Hospital Medicine  Progress Note    Patient Name: Josey Flores  MRN: [id number]  Patient Class: IP- Inpatient   Admission Date: 5/16/2017  Length of Stay: 3 days  Attending Physician: Sajan Sheridan, *  Primary Care Provider: Aileen Sadler MD        Subjective:     Principal Problem:Abscess of axilla, right    HPI:  This is a 47 y/o CF with no PMH except +tobacco abuse until January of this year when she was diagnosed with Breast Cancer to the Right Breast.  Patient was scheduled for a mastectomy, but she was ultimately determined to be Stage IV with mets to the bone and lymph nodes, therefore surgery was cancelled.  Patient reports taking oral chemo daily and has been given 1.5-2 years.  She presented to Dr. Delong's office today secondary to c/o "infected and swollen lymph nodes under Right arm, and to left groin".  Dr. Delong recommended she come to the ER for admission and IV ABX. Dr. Wahl is at the bedside evaluating and plans to take her to surgery tomorrow AM.  She is currently receiving Vanc and Cefepime.  BC have been drawn.  She is afebrile. Admit to Hospital Medicine. Keep NPO after MN for anticipated surgery tomorrow    Hospital Course:  Pt underwent an I & D to bilateral axilla and left groin by Dr. Wahl 5/17/18. Areas have intact wound vac.Pt no longer on Vanc and Cefepime, presently receiving Zyvox.  She has pain only to right axilla with movement. Blood cultures NGTD. NPO after MN for return to surgery 5/19/18.     Interval History:     Seen and examined prior to having repeat I & D. Had difficult time sleeping last night due to pain at the right axilla. She is taking Morphine prn with oxycodone for breakthrough. Family at bedside. Wound vac intact to 3 areas.     Review of Systems   Constitutional: Negative for appetite change, chills, fatigue and fever.   HENT: Negative.    Eyes: Negative for pain and redness.   Respiratory: Negative for " cough, shortness of breath and wheezing.    Cardiovascular: Negative for chest pain and leg swelling.   Gastrointestinal: Negative for abdominal pain, diarrhea, nausea and vomiting.   Genitourinary: Negative for dysuria and frequency.   Musculoskeletal: Positive for myalgias.   Skin: Positive for wound.   Neurological: Negative for dizziness, light-headedness and headaches.   Psychiatric/Behavioral: Negative for confusion.     Objective:     Vital Signs (Most Recent):  Temp: 98.4 °F (36.9 °C) (05/19/17 0725)  Pulse: 78 (05/19/17 1000)  Resp: 18 (05/19/17 1000)  BP: (!) 106/53 (05/19/17 0725)  SpO2: 95 % (05/19/17 1000) Vital Signs (24h Range):  Temp:  [97.2 °F (36.2 °C)-98.4 °F (36.9 °C)] 98.4 °F (36.9 °C)  Pulse:  [77-85] 78  Resp:  [18-20] 18  SpO2:  [91 %-97 %] 95 %  BP: (106-134)/(53-75) 106/53     Weight: 93.9 kg (207 lb)  Body mass index is 32.42 kg/(m^2).    Intake/Output Summary (Last 24 hours) at 05/19/17 1319  Last data filed at 05/19/17 0637   Gross per 24 hour   Intake             1480 ml   Output               25 ml   Net             1455 ml      Physical Exam   Constitutional: She is oriented to person, place, and time. She appears well-developed and well-nourished. No distress.   overweight   HENT:   Head: Normocephalic and atraumatic.   Eyes: Conjunctivae are normal.   Neck: Normal range of motion. Neck supple.   Cardiovascular: Normal rate, regular rhythm and normal heart sounds.    Pulmonary/Chest: Effort normal and breath sounds normal.   Abdominal: Soft. Bowel sounds are normal.   Musculoskeletal: Normal range of motion.   Neurological: She is alert and oriented to person, place, and time.   Skin:   Wound vac to bilateral axilla and left groin   Psychiatric: She has a normal mood and affect. Her behavior is normal.   Nursing note and vitals reviewed.      Significant Labs:   CBC:   Recent Labs  Lab 05/18/17  0500   WBC 6.81   HGB 11.2*   HCT 33.3*        CMP:   Recent Labs  Lab  05/18/17  0500      K 3.9      CO2 28   GLU 98   BUN 7   CREATININE 0.8   CALCIUM 8.5*   ANIONGAP 5*   EGFRNONAA >60     All pertinent labs within the past 24 hours have been reviewed.    Significant Imaging: I have reviewed all pertinent imaging results/findings within the past 24 hours.    Assessment/Plan:      * Abscess of axilla, right    - Continue IV Zyvox  - Blood cultures NGTD  Second I & D with wound vac change on 5/19/17 per Dr. Wahl  Tissue specimen sent to pathology from last procedure 5/17/17      Malignant neoplasm of overlapping sites of right female breast  - Follows with Dr. Delong  -Cont  IV Zyvox  - Per patient she has Stage IV metastatic breast cancer to the bone and lymph system  - Currently takes po chemo daily  - Overall Prognosis is poor (1.5-2 years per patient)  - Will need to f/u with hemoc upon dc    Malignant neoplasm metastatic to lymph node of axilla  Cont current tx    Abscess of left groin  Wound vac chenged 5/19/17        Hidradenitis suppurativa of left axilla  Wound vac change 5/19/17  No leukocytosis, pt afebrile      VTE Risk Mitigation         Ordered     enoxaparin injection 40 mg  Every 24 hours (non-standard times)     Route:  Subcutaneous        05/17/17 1643     Medium Risk of VTE  Once      05/17/17 1643     Place sequential compression device  Until discontinued      05/17/17 1643     Place sequential compression device  Until discontinued      05/16/17 2035          Saira Jack NP  Department of Hospital Medicine   [facility]

## 2017-05-20 PROBLEM — L02.214 ABSCESS OF LEFT GROIN: Status: RESOLVED | Noted: 2017-05-20 | Resolved: 2017-05-19

## 2017-05-20 LAB
ALBUMIN SERPL BCP-MCNC: 2.9 G/DL
ALP SERPL-CCNC: 84 U/L
ALT SERPL W/O P-5'-P-CCNC: 60 U/L
ANION GAP SERPL CALC-SCNC: 7 MMOL/L
AST SERPL-CCNC: 21 U/L
BASOPHILS NFR BLD: 0 %
BILIRUB SERPL-MCNC: 0.2 MG/DL
BUN SERPL-MCNC: 5 MG/DL
CALCIUM SERPL-MCNC: 8.3 MG/DL
CHLORIDE SERPL-SCNC: 108 MMOL/L
CO2 SERPL-SCNC: 25 MMOL/L
CREAT SERPL-MCNC: 0.8 MG/DL
DIFFERENTIAL METHOD: ABNORMAL
EOSINOPHIL NFR BLD: 0 %
ERYTHROCYTE [DISTWIDTH] IN BLOOD BY AUTOMATED COUNT: 15.1 %
EST. GFR  (AFRICAN AMERICAN): >60 ML/MIN/1.73 M^2
EST. GFR  (NON AFRICAN AMERICAN): >60 ML/MIN/1.73 M^2
GLUCOSE SERPL-MCNC: 142 MG/DL
HCT VFR BLD AUTO: 30.5 %
HGB BLD-MCNC: 10.3 G/DL
LYMPHOCYTES NFR BLD: 26 %
MCH RBC QN AUTO: 35 PG
MCHC RBC AUTO-ENTMCNC: 33.8 %
MCV RBC AUTO: 104 FL
MONOCYTES NFR BLD: 11 %
NEUTROPHILS NFR BLD: 63 %
PLATELET # BLD AUTO: 239 K/UL
PMV BLD AUTO: 10.2 FL
POTASSIUM SERPL-SCNC: 4.1 MMOL/L
PROT SERPL-MCNC: 6.3 G/DL
RBC # BLD AUTO: 2.94 M/UL
SODIUM SERPL-SCNC: 140 MMOL/L
WBC # BLD AUTO: 6.61 K/UL

## 2017-05-20 PROCEDURE — 80053 COMPREHEN METABOLIC PANEL: CPT

## 2017-05-20 PROCEDURE — 96372 THER/PROPH/DIAG INJ SC/IM: CPT

## 2017-05-20 PROCEDURE — 63600175 PHARM REV CODE 636 W HCPCS: Performed by: SURGERY

## 2017-05-20 PROCEDURE — 99900035 HC TECH TIME PER 15 MIN (STAT)

## 2017-05-20 PROCEDURE — 25000003 PHARM REV CODE 250: Performed by: EMERGENCY MEDICINE

## 2017-05-20 PROCEDURE — 25000003 PHARM REV CODE 250: Performed by: INTERNAL MEDICINE

## 2017-05-20 PROCEDURE — 85007 BL SMEAR W/DIFF WBC COUNT: CPT

## 2017-05-20 PROCEDURE — 94799 UNLISTED PULMONARY SVC/PX: CPT

## 2017-05-20 PROCEDURE — 99233 SBSQ HOSP IP/OBS HIGH 50: CPT | Mod: ,,, | Performed by: INTERNAL MEDICINE

## 2017-05-20 PROCEDURE — 21400001 HC TELEMETRY ROOM

## 2017-05-20 PROCEDURE — 63600175 PHARM REV CODE 636 W HCPCS: Performed by: INTERNAL MEDICINE

## 2017-05-20 PROCEDURE — 25000003 PHARM REV CODE 250: Performed by: NURSE PRACTITIONER

## 2017-05-20 PROCEDURE — 85027 COMPLETE CBC AUTOMATED: CPT

## 2017-05-20 PROCEDURE — 25000003 PHARM REV CODE 250: Performed by: SURGERY

## 2017-05-20 PROCEDURE — 36415 COLL VENOUS BLD VENIPUNCTURE: CPT

## 2017-05-20 PROCEDURE — 99024 POSTOP FOLLOW-UP VISIT: CPT | Mod: ,,, | Performed by: SURGERY

## 2017-05-20 RX ADMIN — OXYCODONE HYDROCHLORIDE AND ACETAMINOPHEN 1 TABLET: 10; 325 TABLET ORAL at 02:05

## 2017-05-20 RX ADMIN — ENOXAPARIN SODIUM 40 MG: 100 INJECTION SUBCUTANEOUS at 11:05

## 2017-05-20 RX ADMIN — THERA TABS 1 TABLET: TAB at 08:05

## 2017-05-20 RX ADMIN — MORPHINE SULFATE 4 MG: 4 INJECTION, SOLUTION INTRAMUSCULAR; INTRAVENOUS at 11:05

## 2017-05-20 RX ADMIN — SODIUM CHLORIDE, SODIUM LACTATE, POTASSIUM CHLORIDE, AND CALCIUM CHLORIDE: .6; .31; .03; .02 INJECTION, SOLUTION INTRAVENOUS at 06:05

## 2017-05-20 RX ADMIN — OXYCODONE HYDROCHLORIDE AND ACETAMINOPHEN 1 TABLET: 5; 325 TABLET ORAL at 10:05

## 2017-05-20 RX ADMIN — OXYCODONE HYDROCHLORIDE AND ACETAMINOPHEN 1 TABLET: 10; 325 TABLET ORAL at 06:05

## 2017-05-20 RX ADMIN — CITALOPRAM HYDROBROMIDE 20 MG: 20 TABLET ORAL at 08:05

## 2017-05-20 RX ADMIN — MORPHINE SULFATE 4 MG: 4 INJECTION, SOLUTION INTRAMUSCULAR; INTRAVENOUS at 05:05

## 2017-05-20 RX ADMIN — LINEZOLID 600 MG: 600 INJECTION, SOLUTION INTRAVENOUS at 01:05

## 2017-05-20 RX ADMIN — OXYCODONE HYDROCHLORIDE AND ACETAMINOPHEN 1 TABLET: 10; 325 TABLET ORAL at 10:05

## 2017-05-20 RX ADMIN — MORPHINE SULFATE 4 MG: 4 INJECTION, SOLUTION INTRAMUSCULAR; INTRAVENOUS at 12:05

## 2017-05-20 RX ADMIN — MORPHINE SULFATE 4 MG: 4 INJECTION, SOLUTION INTRAMUSCULAR; INTRAVENOUS at 04:05

## 2017-05-20 RX ADMIN — PANTOPRAZOLE SODIUM 40 MG: 40 TABLET, DELAYED RELEASE ORAL at 08:05

## 2017-05-20 RX ADMIN — LINEZOLID 600 MG: 600 INJECTION, SOLUTION INTRAVENOUS at 02:05

## 2017-05-20 RX ADMIN — ALPRAZOLAM 0.5 MG: 0.5 TABLET ORAL at 08:05

## 2017-05-20 NOTE — PROGRESS NOTES
DAYANNA recieved call back voice message from [facility] with I advising that order is ready for release (with pending authorization).  DAYANNA returned call to Bladimir to advise that patient's anticipated discharge is Monday and to advise that we still needed to secure a HH provider.  Bladimir advised SW that he would get order out early on Monday so as not to delay patient's discharge and we could provide him with the name of the HH provider on Monday.  DAYANNA will have case management team f/u with Bladimir at [phone] on Monday.  Talia Castellanos LMSW, MARTINEZ-DAYANNA, CCM  05/20/2017

## 2017-05-20 NOTE — ASSESSMENT & PLAN NOTE
Patient has undergone excision of hidradenitis of the right and left axilla as well as the left groin.  She will undergo wound VAC changes on Monday Wednesdays and Fridays.  Once a granulation bed has formed she will need skin grafts of the right and left axilla.  Left groin is likely close on its own    Continue with current wound VAC.

## 2017-05-20 NOTE — PLAN OF CARE
Problem: Patient Care Overview  Goal: Plan of Care Review  Outcome: Ongoing (interventions implemented as appropriate)  Pt remained free of injury/ falls. Fall precautions in place. Pain controlled with PRN medication. Pt tolerating regular diet. IVF maintained. IV antibiotics given as ordered. Wound vac to right axilla, left axilla, and left groin clean dry intact with good seal with wound vac. Pt able to verbalize needs and wants. Bed in low, locked position, call light and personal items within reach of pt. No acute distress noted. Family at bedside. VSS. Will continue to monitor.

## 2017-05-20 NOTE — SUBJECTIVE & OBJECTIVE
Interval History: Denies complaints today. Wound vac x 3 in place    Review of Systems   Constitutional: Negative for appetite change, chills, fatigue and fever.   HENT: Negative.  Negative for congestion, dental problem, ear pain, facial swelling, mouth sores, nosebleeds, postnasal drip, sneezing and sore throat.    Eyes: Negative for pain, discharge, redness and itching.   Respiratory: Negative for cough, shortness of breath and wheezing.    Cardiovascular: Negative for chest pain and leg swelling.   Gastrointestinal: Negative for abdominal pain, diarrhea, nausea and vomiting.   Endocrine: Negative.    Genitourinary: Negative for dysuria and frequency.   Musculoskeletal: Positive for myalgias.   Skin: Positive for wound.   Allergic/Immunologic: Positive for immunocompromised state.   Neurological: Negative for dizziness, tremors, syncope, weakness, light-headedness and headaches.   Psychiatric/Behavioral: Negative for confusion, dysphoric mood and hallucinations. The patient is not hyperactive.      Objective:     Vital Signs (Most Recent):  Temp: 98.3 °F (36.8 °C) (05/20/17 1645)  Pulse: 71 (05/20/17 1645)  Resp: 18 (05/20/17 1645)  BP: (!) 122/58 (05/20/17 1645)  SpO2: 95 % (05/20/17 1645) Vital Signs (24h Range):  Temp:  [97.7 °F (36.5 °C)-98.9 °F (37.2 °C)] 98.3 °F (36.8 °C)  Pulse:  [67-86] 71  Resp:  [16-18] 18  SpO2:  [93 %-96 %] 95 %  BP: (107-129)/(54-67) 122/58     Weight: 93.9 kg (207 lb)  Body mass index is 32.42 kg/(m^2).    Intake/Output Summary (Last 24 hours) at 05/20/17 1659  Last data filed at 05/20/17 1647   Gross per 24 hour   Intake          6178.33 ml   Output                0 ml   Net          6178.33 ml      Physical Exam   Constitutional: No distress.   Appears chronically ill   HENT:   Head: Normocephalic.   Left Ear: External ear normal.   Nose: Nose normal.   Mouth/Throat: Oropharynx is clear and moist and mucous membranes are normal. Mucous membranes are not pale. No oropharyngeal  exudate.   Eyes: Pupils are equal, round, and reactive to light. Right eye exhibits no discharge. Left eye exhibits no discharge. No scleral icterus.   Neck: Normal range of motion. Neck supple.   Cardiovascular: Normal rate, regular rhythm and normal heart sounds.    Pulmonary/Chest: Effort normal.   Abdominal: Soft. Bowel sounds are normal.   Musculoskeletal: She exhibits no edema.   Skin:   There is a wound VAC to the right and left axilla with no leakage  There is a wound VAC to the left groin with no leakage   Vitals reviewed.      Significant Labs:   CBC:   Recent Labs  Lab 05/20/17  0709   WBC 6.61   HGB 10.3*   HCT 30.5*        CMP:   Recent Labs  Lab 05/20/17  0709      K 4.1      CO2 25   *   BUN 5*   CREATININE 0.8   CALCIUM 8.3*   PROT 6.3   ALBUMIN 2.9*   BILITOT 0.2   ALKPHOS 84   AST 21   ALT 60*   ANIONGAP 7*   EGFRNONAA >60       Significant Imaging: None:

## 2017-05-20 NOTE — PROGRESS NOTES
"[facility] Hospital Medicine  Progress Note    Patient Name: Josey Flores  MRN: [id number]  Patient Class: IP- Inpatient   Admission Date: 5/16/2017  Length of Stay: 4 days  Attending Physician: Darrion Alvarez MD  Primary Care Provider: Aileen Sadler MD    Subjective:     Principal Problem:Hidradenitis    HPI:  This is a 45 y/o CF with no PMH except +tobacco abuse until January of this year when she was diagnosed with Breast Cancer to the Right Breast.  Patient was scheduled for a mastectomy, but she was ultimately determined to be Stage IV with mets to the bone and lymph nodes, therefore surgery was cancelled.  Patient reports taking oral chemo daily and has been given 1.5-2 years.  She presented to Dr. Delong's office today secondary to c/o "infected and swollen lymph nodes under Right arm, and to left groin".  Dr. Delong recommended she come to the ER for admission and IV ABX. Dr. Wahl is at the bedside evaluating and plans to take her to surgery tomorrow AM.  She is currently receiving Vanc and Cefepime.  BC have been drawn.  She is afebrile. Admit to Hospital Medicine. Keep NPO after MN for anticipated surgery tomorrow    Hospital Course:  Pt underwent an I & D to bilateral axilla and left groin by Dr. Wahl 5/17/18. Areas have intact wound vac.Pt no longer on Vanc and Cefepime, presently receiving Zyvox.  She has pain only to right axilla with movement. Blood cultures NGTD. NPO after MN for return to surgery 5/19/18.     Interval History: Denies complaints today. Wound vac x 3 in place    Review of Systems   Constitutional: Negative for appetite change, chills, fatigue and fever.   HENT: Negative.  Negative for congestion, dental problem, ear pain, facial swelling, mouth sores, nosebleeds, postnasal drip, sneezing and sore throat.    Eyes: Negative for pain, discharge, redness and itching.   Respiratory: Negative for cough, shortness of breath and wheezing.  "   Cardiovascular: Negative for chest pain and leg swelling.   Gastrointestinal: Negative for abdominal pain, diarrhea, nausea and vomiting.   Endocrine: Negative.    Genitourinary: Negative for dysuria and frequency.   Musculoskeletal: Positive for myalgias.   Skin: Positive for wound.   Allergic/Immunologic: Positive for immunocompromised state.   Neurological: Negative for dizziness, tremors, syncope, weakness, light-headedness and headaches.   Psychiatric/Behavioral: Negative for confusion, dysphoric mood and hallucinations. The patient is not hyperactive.      Objective:     Vital Signs (Most Recent):  Temp: 98.3 °F (36.8 °C) (05/20/17 1645)  Pulse: 71 (05/20/17 1645)  Resp: 18 (05/20/17 1645)  BP: (!) 122/58 (05/20/17 1645)  SpO2: 95 % (05/20/17 1645) Vital Signs (24h Range):  Temp:  [97.7 °F (36.5 °C)-98.9 °F (37.2 °C)] 98.3 °F (36.8 °C)  Pulse:  [67-86] 71  Resp:  [16-18] 18  SpO2:  [93 %-96 %] 95 %  BP: (107-129)/(54-67) 122/58     Weight: 93.9 kg (207 lb)  Body mass index is 32.42 kg/(m^2).    Intake/Output Summary (Last 24 hours) at 05/20/17 1659  Last data filed at 05/20/17 1647   Gross per 24 hour   Intake          6178.33 ml   Output                0 ml   Net          6178.33 ml      Physical Exam   Constitutional: No distress.   Appears chronically ill   HENT:   Head: Normocephalic.   Left Ear: External ear normal.   Nose: Nose normal.   Mouth/Throat: Oropharynx is clear and moist and mucous membranes are normal. Mucous membranes are not pale. No oropharyngeal exudate.   Eyes: Pupils are equal, round, and reactive to light. Right eye exhibits no discharge. Left eye exhibits no discharge. No scleral icterus.   Neck: Normal range of motion. Neck supple.   Cardiovascular: Normal rate, regular rhythm and normal heart sounds.    Pulmonary/Chest: Effort normal.   Abdominal: Soft. Bowel sounds are normal.   Musculoskeletal: She exhibits no edema.   Skin:   There is a wound VAC to the right and left axilla  with no leakage  There is a wound VAC to the left groin with no leakage   Vitals reviewed.      Significant Labs:   CBC:   Recent Labs  Lab 05/20/17  0709   WBC 6.61   HGB 10.3*   HCT 30.5*        CMP:   Recent Labs  Lab 05/20/17  0709      K 4.1      CO2 25   *   BUN 5*   CREATININE 0.8   CALCIUM 8.3*   PROT 6.3   ALBUMIN 2.9*   BILITOT 0.2   ALKPHOS 84   AST 21   ALT 60*   ANIONGAP 7*   EGFRNONAA >60       Significant Imaging: None:     Assessment/Plan:      * Hidradenitis  -Patient has undergone excision of hidradenitis of the right and left axilla as well as the left groin on 5/17/17. Wound VAC was changed in the operating room 5/19/17 . She will undergo wound VAC changes on Monday Wednesdays and Fridays. Once a granulation bed has formed she will need skin grafts of the right and left axilla. Left groin is likely close on its own   -Will begin bedside wound VAC changes on Monday by the wound care nurse per General Surgery  -Continue IV Zyvoxx for now             Malignant neoplasm of overlapping sites of right female breast  - Follows with Dr. Delong. Per patient she has Stage IV metastatic breast cancer to the bone and lymph system  -Takes PO Letrozole and Ibrance for chemotherapy. Currently on hold. Plans to restart on Monday.   -Hematology following    Malignant neoplasm metastatic to lymph node of axilla  -Plan as above    VTE Risk Mitigation         Ordered     enoxaparin injection 40 mg  Every 24 hours (non-standard times)     Route:  Subcutaneous        05/17/17 1643     Medium Risk of VTE  Once      05/17/17 1643     Place sequential compression device  Until discontinued      05/17/17 1643          Denia Westbrook NP  Department of Hospital Medicine   [facility]

## 2017-05-20 NOTE — ASSESSMENT & PLAN NOTE
-Patient has undergone excision of hidradenitis of the right and left axilla as well as the left groin on 5/17/17. Wound VAC was changed in the operating room 5/19/17 . She will undergo wound VAC changes on Monday Wednesdays and Fridays. Once a granulation bed has formed she will need skin grafts of the right and left axilla. Left groin is likely close on its own   -Will begin bedside wound VAC changes on Monday by the wound care nurse per General Surgery  -Continue IV Zyvoxx for now

## 2017-05-20 NOTE — ASSESSMENT & PLAN NOTE
Metastatic breast cancer: On Letrozole and Ibrance. She started on Ibrance in 2/2017 and has completed 4 cycles thus far. Plan to restart letrozole and Ibrance on Monday. No significant cytopenias with previous cycles.     --restart letrozole and Ibrance on Monday

## 2017-05-20 NOTE — SUBJECTIVE & OBJECTIVE
Interval History:  No events overnight    Oncology Treatment Plan:   [No treatment plan]    Medications:  Continuous Infusions:   lactated ringers 75 mL/hr at 05/20/17 0605     Scheduled Meds:   citalopram  20 mg Oral Daily    enoxparin  40 mg Subcutaneous Q24H    [START ON 5/23/2017] ergocalciferol  50,000 Units Oral Q7 Days    linezolid 600mg/300ml  600 mg Intravenous Q12H    multivitamin  1 tablet Oral Daily    pantoprazole  40 mg Oral Daily     PRN Meds:albuterol sulfate, alprazolam, aluminum-magnesium hydroxide-simethicone, dextromethorphan-guaifenesin  mg/5 ml, dextrose 50%, dextrose 50%, diphenhydrAMINE, glucagon (human recombinant), glucose, glucose, metoclopramide HCl, morphine, ondansetron, ondansetron, oxycodone-acetaminophen, oxycodone-acetaminophen, sodium chloride 0.9%, sodium chloride 0.9%     Review of Systems   Constitutional: Negative for appetite change, chills, fatigue and fever.   HENT: Negative.  Negative for congestion, dental problem, ear pain, facial swelling, mouth sores, nosebleeds, postnasal drip, sneezing and sore throat.    Eyes: Negative for pain, discharge, redness and itching.   Respiratory: Negative for cough, shortness of breath and wheezing.    Cardiovascular: Negative for chest pain and leg swelling.   Gastrointestinal: Negative for abdominal pain, diarrhea, nausea and vomiting.   Endocrine: Negative.    Genitourinary: Negative for dysuria and frequency.   Musculoskeletal: Positive for myalgias.   Skin: Positive for wound.   Allergic/Immunologic: Positive for immunocompromised state.   Neurological: Negative for dizziness, tremors, syncope, weakness, light-headedness and headaches.   Psychiatric/Behavioral: Negative for confusion, dysphoric mood and hallucinations. The patient is not hyperactive.      Objective:     Vital Signs (Most Recent):  Temp: 98.8 °F (37.1 °C) (05/20/17 0724)  Pulse: 77 (05/20/17 0724)  Resp: 18 (05/20/17 0724)  BP: 120/67 (05/20/17  0724)  SpO2: 95 % (05/20/17 0724) Vital Signs (24h Range):  Temp:  [97.6 °F (36.4 °C)-99 °F (37.2 °C)] 98.8 °F (37.1 °C)  Pulse:  [67-86] 77  Resp:  [16-20] 18  SpO2:  [93 %-100 %] 95 %  BP: (107-182)/(54-77) 120/67     Weight: 93.9 kg (207 lb)  Body mass index is 32.42 kg/(m^2).  Body surface area is 2.11 meters squared.      Intake/Output Summary (Last 24 hours) at 05/20/17 0830  Last data filed at 05/20/17 0605   Gross per 24 hour   Intake          4815.83 ml   Output               10 ml   Net          4805.83 ml       Physical Exam   Constitutional: No distress.   HENT:   Head: Normocephalic.   Left Ear: External ear normal.   Nose: Nose normal.   Mouth/Throat: Oropharynx is clear and moist and mucous membranes are normal. Mucous membranes are not pale. No oropharyngeal exudate.   Eyes: Pupils are equal, round, and reactive to light. Right eye exhibits no discharge. Left eye exhibits no discharge. No scleral icterus.   Neck: Normal range of motion. Neck supple.   Cardiovascular: Normal rate, regular rhythm and normal heart sounds.    Pulmonary/Chest: Effort normal.   Abdominal: Soft. Bowel sounds are normal.   Musculoskeletal: She exhibits no edema.   Skin:   There is a wound VAC to the right and left axilla with no leakage  There is a wound VAC to the left groin with no leakage   Vitals reviewed.      Significant Labs:   CBC:   Recent Labs  Lab 05/20/17  0709   WBC 6.61   HGB 10.3*   HCT 30.5*       and CMP:   Recent Labs  Lab 05/20/17  0709      K 4.1      CO2 25   *   BUN 5*   CREATININE 0.8   CALCIUM 8.3*   PROT 6.3   ALBUMIN 2.9*   BILITOT 0.2   ALKPHOS 84   AST 21   ALT 60*   ANIONGAP 7*   EGFRNONAA >60       Diagnostic Results:  I have reviewed all pertinent imaging results/findings within the past 24 hours.

## 2017-05-20 NOTE — PLAN OF CARE
Pt remained free of injury/ falls. Fall precautions in place. Pain controlled with PRN medication. Pt tolerating regular diet. IVF maintained. IV antibiotics given as ordered. Wound vac to right axilla, left axilla, and left groin clean dry intact with good seal with wound vac. Pt able to verbalize needs and wants. Bed in low, locked position, call light and personal items within reach of pt. No acute distress noted. Family at bedside. VSS. Will continue to monitor.

## 2017-05-20 NOTE — PLAN OF CARE
Problem: Patient Care Overview  Goal: Plan of Care Review  Outcome: Ongoing (interventions implemented as appropriate)  Pt verbalized understanding of plan of care. Fall precautions maintained.  Bed locked and low.  Call light and personal items within reach. SR up x 2. Pain adequately controlled with PRN pain meds. IVAB admin as ordered. Hand washing/ hand sanitizers encouraged.

## 2017-05-20 NOTE — CONSULTS
"    On 5/18/2017, DAYANNA Bell recieved Consult noted to arrange wound vac (with wound vac changes [facility]) and  SN services for patient.  The following information was documented by Ray: " Reviewed documentation and noted that patient has three wound sites (bilateral axilla and left groin). Met with patient in room and discussed possible discharge options with her. Patient receptive to any discharge option and indicates that wound vac preference is [facility] and outpatient wound clinic preference is [facility]. Patient indicates having no preference should she require LTAC or NH level of care post-hospitalization. Patient Preference Form signed reflecting these preferences; and signed form placed in patient's blue folder. Contacted Rosanna with [facility] ([phone]) who indicates that they accept patient's insurance and are willing to review patient information to see if they can accept patient. [facility]  , Jolynn LANG, to fax patient information to [facility] tomorrow at fax ([phone]). Contacted Saira with [facility]  who states that they may also be able to accept patient; however, would need outpatient wound clinic to assist with wound vac changes. Due to patient stating that she plans on remaining in [facility] post-hospitalization, contacted [facility] ([phone]) and determined that they accept patient's insurance. Along with completed referral request form, faxed it and patient information to [phone] to complete outpatient wound clinic referral. Also, due to hospitalist indicating that patient may require placement at a facility post-hospitalization, contacted Keira at [facility] in [place] and determined that they accept patient's insurance and are willing to review patient's information as well. Also, contacted [facility] and spoke with Heather who indicates that they are currently unable to " "accept any Medicaid patients due to their current Medicaid patients' acuity level being to high. Contacted Kyara with [facility] who states that they are willing to review patient information and determine whether they may be able to accept. Patient information faxed, via [employer], to [facility] [place] or [facility].   Also, placed [facility] Wound Vac Form in patient's blue folder and requested that surgeon complete it. "    Weekend , faxed completed [facility] application to [phone] with note to f/u with Ray Bell on Monday.  Original [facility] application in patient's chart.  Copy of application and clinical that were faxed to [facility] uploaded into [employer].  DAYANNA spoke with on-call nurse, Bhavani, at [facility] to advise that information would be faxed for their agency to review and advise on Monday if they will be able to provide  services.  DAYANNA faxed patient's demographics, [facility] application, and clinicals to [place] [address] at [phone].  Copy of information faxed to  provider uploaded to [employer].  DAYANNA/ Team to f/u with providers on Monday.  Talia Castellanos LMSW, MARTINEZ-DAYANNA, CCM  05/20/2017      :    "

## 2017-05-20 NOTE — PROGRESS NOTES
[facility]   Hematology/Oncology  Progress Note    Patient Name: Josey Flores  Admission Date: 5/16/2017  Hospital Length of Stay: 4 days  Code Status: Full Code     Subjective:     HPI:  Josey Flores is a 46 y.o. female with metastatic breast cancer on chemotherapy admitted for management of multiple abscesses related to hidradenitis. She has reportedly had recurrent problems with hidradenitis for several years. She has been managed with outpatient antibiotics in the past. However, she was evaluated on 5/16 in [facility] clinic by Dr. Delong with complaint of worsening pain in her right axilla and left groin. She was referred for admission for surgical I&D of these abscesses. She was started on IV Cefepime and Linezolid. She was evaluated by surgery and underwent excision of skin, subcutaneous tissue and lymph nodes in the R axilla and L groin with wound vac placement today. She states she currently has a great deal of pain in the R axilla, but is doing well otherwise. No fevers.          Interval History:  No events overnight    Oncology Treatment Plan:   [No treatment plan]    Medications:  Continuous Infusions:   lactated ringers 75 mL/hr at 05/20/17 0605     Scheduled Meds:   citalopram  20 mg Oral Daily    enoxparin  40 mg Subcutaneous Q24H    [START ON 5/23/2017] ergocalciferol  50,000 Units Oral Q7 Days    linezolid 600mg/300ml  600 mg Intravenous Q12H    multivitamin  1 tablet Oral Daily    pantoprazole  40 mg Oral Daily     PRN Meds:albuterol sulfate, alprazolam, aluminum-magnesium hydroxide-simethicone, dextromethorphan-guaifenesin  mg/5 ml, dextrose 50%, dextrose 50%, diphenhydrAMINE, glucagon (human recombinant), glucose, glucose, metoclopramide HCl, morphine, ondansetron, ondansetron, oxycodone-acetaminophen, oxycodone-acetaminophen, sodium chloride 0.9%, sodium chloride 0.9%     Review of Systems   Constitutional: Negative for appetite change, chills, fatigue and  fever.   HENT: Negative.  Negative for congestion, dental problem, ear pain, facial swelling, mouth sores, nosebleeds, postnasal drip, sneezing and sore throat.    Eyes: Negative for pain, discharge, redness and itching.   Respiratory: Negative for cough, shortness of breath and wheezing.    Cardiovascular: Negative for chest pain and leg swelling.   Gastrointestinal: Negative for abdominal pain, diarrhea, nausea and vomiting.   Endocrine: Negative.    Genitourinary: Negative for dysuria and frequency.   Musculoskeletal: Positive for myalgias.   Skin: Positive for wound.   Allergic/Immunologic: Positive for immunocompromised state.   Neurological: Negative for dizziness, tremors, syncope, weakness, light-headedness and headaches.   Psychiatric/Behavioral: Negative for confusion, dysphoric mood and hallucinations. The patient is not hyperactive.      Objective:     Vital Signs (Most Recent):  Temp: 98.8 °F (37.1 °C) (05/20/17 0724)  Pulse: 77 (05/20/17 0724)  Resp: 18 (05/20/17 0724)  BP: 120/67 (05/20/17 0724)  SpO2: 95 % (05/20/17 0724) Vital Signs (24h Range):  Temp:  [97.6 °F (36.4 °C)-99 °F (37.2 °C)] 98.8 °F (37.1 °C)  Pulse:  [67-86] 77  Resp:  [16-20] 18  SpO2:  [93 %-100 %] 95 %  BP: (107-182)/(54-77) 120/67     Weight: 93.9 kg (207 lb)  Body mass index is 32.42 kg/(m^2).  Body surface area is 2.11 meters squared.      Intake/Output Summary (Last 24 hours) at 05/20/17 0830  Last data filed at 05/20/17 0605   Gross per 24 hour   Intake          4815.83 ml   Output               10 ml   Net          4805.83 ml       Physical Exam   Constitutional: No distress.   HENT:   Head: Normocephalic.   Left Ear: External ear normal.   Nose: Nose normal.   Mouth/Throat: Oropharynx is clear and moist and mucous membranes are normal. Mucous membranes are not pale. No oropharyngeal exudate.   Eyes: Pupils are equal, round, and reactive to light. Right eye exhibits no discharge. Left eye exhibits no discharge. No scleral  icterus.   Neck: Normal range of motion. Neck supple.   Cardiovascular: Normal rate, regular rhythm and normal heart sounds.    Pulmonary/Chest: Effort normal.   Abdominal: Soft. Bowel sounds are normal.   Musculoskeletal: She exhibits no edema.   Skin:   There is a wound VAC to the right and left axilla with no leakage  There is a wound VAC to the left groin with no leakage   Vitals reviewed.      Significant Labs:   CBC:   Recent Labs  Lab 05/20/17  0709   WBC 6.61   HGB 10.3*   HCT 30.5*       and CMP:   Recent Labs  Lab 05/20/17  0709      K 4.1      CO2 25   *   BUN 5*   CREATININE 0.8   CALCIUM 8.3*   PROT 6.3   ALBUMIN 2.9*   BILITOT 0.2   ALKPHOS 84   AST 21   ALT 60*   ANIONGAP 7*   EGFRNONAA >60       Diagnostic Results:  I have reviewed all pertinent imaging results/findings within the past 24 hours.         Assessment/Plan:     Malignant neoplasm of overlapping sites of right female breast  Metastatic breast cancer: On Letrozole and Ibrance. She started on Ibrance in 2/2017 and has completed 4 cycles thus far. Plan to restart letrozole and Ibrance on Monday. No significant cytopenias with previous cycles.     --restart letrozole and Ibrance on Monday    Hidradenitis  Right axillary and left inguinal abscesses: s/p surgical excision of skin, surrounding subcutaneous tissue - currently with wound vac in place.  --Linezolid      Thank you for your consult. I will follow-up with patient. Please contact us if you have any additional questions.     Arya Keith Jr, MD  Hematology/Oncology  [facility]

## 2017-05-20 NOTE — ASSESSMENT & PLAN NOTE
- Follows with Dr. Delong. Per patient she has Stage IV metastatic breast cancer to the bone and lymph system  -Takes PO Letrozole and Ibrance for chemotherapy. Currently on hold. Plans to restart on Monday.   -Hematology following

## 2017-05-20 NOTE — ASSESSMENT & PLAN NOTE
Right axillary and left inguinal abscesses: s/p surgical excision of skin, surrounding subcutaneous tissue - currently with wound vac in place.  --Linezolid

## 2017-05-20 NOTE — PROGRESS NOTES
[facility] -   General Surgery  Progress Note    Subjective:     History of Present Illness:  Patient has a history of hidradenitis.  This has been essentially treated with antibiotics and local wound care.  He has had not had extensive surgery.  She was diagnosed with breast mass in the metastatic breast cancer.  She is on treatment with ibrance and letrozole.  She saw her oncologist today and complained about redness, pain and swelling of the right axilla and the left groin.  He diagnosed her with a active infection in the area of hidradenitis and sent to the emergency room to be admitted for IV antibiotics and surgical consultation.    Patient states she's had hidradenitis for many years she never had any aggressive procedures regarding treatment for this.  She also has changes of her left axilla but these are not actively bothering her.  She has not had any significant right groin involvement in the recent past      Post-Op Info:  Procedure(s) (LRB):  EXCHANGE-WOUND VAC (Bilateral)   1 Day Post-Op     Interval History: improved condition with wound VAC.    Medications:  Continuous Infusions:   lactated ringers 75 mL/hr at 05/20/17 0605     Scheduled Meds:   citalopram  20 mg Oral Daily    enoxparin  40 mg Subcutaneous Q24H    [START ON 5/23/2017] ergocalciferol  50,000 Units Oral Q7 Days    linezolid 600mg/300ml  600 mg Intravenous Q12H    multivitamin  1 tablet Oral Daily    pantoprazole  40 mg Oral Daily     PRN Meds:albuterol sulfate, alprazolam, aluminum-magnesium hydroxide-simethicone, dextromethorphan-guaifenesin  mg/5 ml, dextrose 50%, dextrose 50%, diphenhydrAMINE, glucagon (human recombinant), glucose, glucose, metoclopramide HCl, morphine, ondansetron, ondansetron, oxycodone-acetaminophen, oxycodone-acetaminophen, sodium chloride 0.9%, sodium chloride 0.9%     Review of patient's allergies indicates:   Allergen Reactions    Vancomycin analogues Itching     Objective:      Vital Signs (Most Recent):  Temp: 98.8 °F (37.1 °C) (05/20/17 0724)  Pulse: 77 (05/20/17 0724)  Resp: 18 (05/20/17 0724)  BP: 120/67 (05/20/17 0724)  SpO2: 95 % (05/20/17 0724) Vital Signs (24h Range):  Temp:  [97.6 °F (36.4 °C)-99 °F (37.2 °C)] 98.8 °F (37.1 °C)  Pulse:  [67-86] 77  Resp:  [16-20] 18  SpO2:  [93 %-100 %] 95 %  BP: (107-182)/(54-77) 120/67     Weight: 93.9 kg (207 lb)  Body mass index is 32.42 kg/(m^2).    Intake/Output - Last 3 Shifts       05/18 0700 - 05/19 0659 05/19 0700 - 05/20 0659 05/20 0700 - 05/21 0659    P.O. 1080  360    I.V. (mL/kg)  4215.8 (44.9)     IV Piggyback 600 600     Total Intake(mL/kg) 1680 (17.9) 4815.8 (51.3) 360 (3.8)    Urine (mL/kg/hr) 0 (0) 0 (0)     Drains  0 (0)     Other 25 (0)      Stool       Blood  10 (0)     Total Output 25 10      Net +1655 +4805.8 +360           Urine Occurrence 9 x 8 x 1 x    Stool Occurrence 1 x  0 x          Physical Exam   Constitutional: She is oriented to person, place, and time. She appears well-developed and well-nourished.   HENT:   Head: Normocephalic.   Right Ear: External ear normal.   Left Ear: External ear normal.   Nose: Nose normal.   Eyes: Pupils are equal, round, and reactive to light. No scleral icterus.   Neck: Normal range of motion. Neck supple. No thyromegaly present.   Cardiovascular: Normal rate, regular rhythm and normal heart sounds.    No murmur heard.  Pulmonary/Chest: Effort normal and breath sounds normal.   Abdominal: Soft. Bowel sounds are normal. There is no tenderness. There is no guarding.   Musculoskeletal: Normal range of motion.   Lymphadenopathy:     She has no cervical adenopathy.   Neurological: She is alert and oriented to person, place, and time.   Skin: Skin is warm and dry.       Significant Labs:  CBC:   Recent Labs  Lab 05/20/17  0709   WBC 6.61   RBC 2.94*   HGB 10.3*   HCT 30.5*      *   MCH 35.0*   MCHC 33.8     CMP:   Recent Labs  Lab 05/20/17  0709   *   CALCIUM 8.3*    ALBUMIN 2.9*   PROT 6.3      K 4.1   CO2 25      BUN 5*   CREATININE 0.8   ALKPHOS 84   ALT 60*   AST 21   BILITOT 0.2       Significant Diagnostics:  I have reviewed and interpreted all pertinent imaging results/findings within the past 24 hours.    Assessment/Plan:     Hidradenitis  Patient has undergone excision of hidradenitis of the right and left axilla as well as the left groin.  She will undergo wound VAC changes on Monday Wednesdays and Fridays.  Once a granulation bed has formed she will need skin grafts of the right and left axilla.  Left groin is likely close on its own    Continue with current wound VAC.      Messi Davies MD  General Surgery  [facility] -

## 2017-05-21 LAB
ALBUMIN SERPL BCP-MCNC: 2.9 G/DL
ALP SERPL-CCNC: 76 U/L
ALT SERPL W/O P-5'-P-CCNC: 50 U/L
ANION GAP SERPL CALC-SCNC: 4 MMOL/L
AST SERPL-CCNC: 24 U/L
BACTERIA BLD CULT: NORMAL
BASOPHILS NFR BLD: 1 %
BILIRUB SERPL-MCNC: 0.1 MG/DL
BUN SERPL-MCNC: 5 MG/DL
CALCIUM SERPL-MCNC: 8.1 MG/DL
CHLORIDE SERPL-SCNC: 108 MMOL/L
CO2 SERPL-SCNC: 28 MMOL/L
CREAT SERPL-MCNC: 0.9 MG/DL
DIFFERENTIAL METHOD: ABNORMAL
EOSINOPHIL NFR BLD: 1 %
ERYTHROCYTE [DISTWIDTH] IN BLOOD BY AUTOMATED COUNT: 15.6 %
EST. GFR  (AFRICAN AMERICAN): >60 ML/MIN/1.73 M^2
EST. GFR  (NON AFRICAN AMERICAN): >60 ML/MIN/1.73 M^2
GLUCOSE SERPL-MCNC: 91 MG/DL
HCT VFR BLD AUTO: 29.7 %
HGB BLD-MCNC: 10.1 G/DL
LYMPHOCYTES NFR BLD: 51 %
MCH RBC QN AUTO: 35.7 PG
MCHC RBC AUTO-ENTMCNC: 34 %
MCV RBC AUTO: 105 FL
METAMYELOCYTES NFR BLD MANUAL: 1 %
MONOCYTES NFR BLD: 9 %
NEUTROPHILS NFR BLD: 37 %
PLATELET # BLD AUTO: 254 K/UL
PMV BLD AUTO: 10.3 FL
POTASSIUM SERPL-SCNC: 3.9 MMOL/L
PROT SERPL-MCNC: 6 G/DL
RBC # BLD AUTO: 2.83 M/UL
SODIUM SERPL-SCNC: 140 MMOL/L
WBC # BLD AUTO: 7.58 K/UL

## 2017-05-21 PROCEDURE — 96372 THER/PROPH/DIAG INJ SC/IM: CPT

## 2017-05-21 PROCEDURE — 36415 COLL VENOUS BLD VENIPUNCTURE: CPT

## 2017-05-21 PROCEDURE — 25000003 PHARM REV CODE 250: Performed by: NURSE PRACTITIONER

## 2017-05-21 PROCEDURE — 25000003 PHARM REV CODE 250: Performed by: SURGERY

## 2017-05-21 PROCEDURE — 25000003 PHARM REV CODE 250: Performed by: INTERNAL MEDICINE

## 2017-05-21 PROCEDURE — 85027 COMPLETE CBC AUTOMATED: CPT

## 2017-05-21 PROCEDURE — 99233 SBSQ HOSP IP/OBS HIGH 50: CPT | Mod: ,,, | Performed by: INTERNAL MEDICINE

## 2017-05-21 PROCEDURE — 99024 POSTOP FOLLOW-UP VISIT: CPT | Mod: ,,, | Performed by: SURGERY

## 2017-05-21 PROCEDURE — 63600175 PHARM REV CODE 636 W HCPCS: Performed by: INTERNAL MEDICINE

## 2017-05-21 PROCEDURE — 21400001 HC TELEMETRY ROOM

## 2017-05-21 PROCEDURE — 85007 BL SMEAR W/DIFF WBC COUNT: CPT

## 2017-05-21 PROCEDURE — 25000003 PHARM REV CODE 250: Performed by: EMERGENCY MEDICINE

## 2017-05-21 PROCEDURE — 63600175 PHARM REV CODE 636 W HCPCS: Performed by: SURGERY

## 2017-05-21 PROCEDURE — 80053 COMPREHEN METABOLIC PANEL: CPT

## 2017-05-21 RX ADMIN — MORPHINE SULFATE 4 MG: 4 INJECTION, SOLUTION INTRAMUSCULAR; INTRAVENOUS at 07:05

## 2017-05-21 RX ADMIN — CITALOPRAM HYDROBROMIDE 20 MG: 20 TABLET ORAL at 08:05

## 2017-05-21 RX ADMIN — LINEZOLID 600 MG: 600 INJECTION, SOLUTION INTRAVENOUS at 02:05

## 2017-05-21 RX ADMIN — ENOXAPARIN SODIUM 40 MG: 100 INJECTION SUBCUTANEOUS at 11:05

## 2017-05-21 RX ADMIN — MORPHINE SULFATE 4 MG: 4 INJECTION, SOLUTION INTRAMUSCULAR; INTRAVENOUS at 10:05

## 2017-05-21 RX ADMIN — SODIUM CHLORIDE, SODIUM LACTATE, POTASSIUM CHLORIDE, AND CALCIUM CHLORIDE: .6; .31; .03; .02 INJECTION, SOLUTION INTRAVENOUS at 11:05

## 2017-05-21 RX ADMIN — OXYCODONE HYDROCHLORIDE AND ACETAMINOPHEN 1 TABLET: 10; 325 TABLET ORAL at 09:05

## 2017-05-21 RX ADMIN — MORPHINE SULFATE 4 MG: 4 INJECTION, SOLUTION INTRAMUSCULAR; INTRAVENOUS at 05:05

## 2017-05-21 RX ADMIN — PANTOPRAZOLE SODIUM 40 MG: 40 TABLET, DELAYED RELEASE ORAL at 08:05

## 2017-05-21 RX ADMIN — OXYCODONE HYDROCHLORIDE AND ACETAMINOPHEN 1 TABLET: 10; 325 TABLET ORAL at 02:05

## 2017-05-21 RX ADMIN — THERA TABS 1 TABLET: TAB at 08:05

## 2017-05-21 RX ADMIN — MORPHINE SULFATE 4 MG: 4 INJECTION, SOLUTION INTRAMUSCULAR; INTRAVENOUS at 03:05

## 2017-05-21 RX ADMIN — OXYCODONE HYDROCHLORIDE AND ACETAMINOPHEN 1 TABLET: 10; 325 TABLET ORAL at 12:05

## 2017-05-21 RX ADMIN — SODIUM CHLORIDE, SODIUM LACTATE, POTASSIUM CHLORIDE, AND CALCIUM CHLORIDE: .6; .31; .03; .02 INJECTION, SOLUTION INTRAVENOUS at 02:05

## 2017-05-21 NOTE — HPI
"This is a 45 y/o CF with no PMH except +tobacco abuse until January of this year when she was diagnosed with Breast Cancer to the Right Breast.  Patient was scheduled for a mastectomy, but she was ultimately determined to be Stage IV with mets to the bone and lymph nodes, therefore surgery was cancelled.  Patient reports taking oral chemo daily and has been given 1.5-2 years.  She presented to Dr. Delong's office today secondary to c/o "infected and swollen lymph nodes under Right arm, and to left groin".  Dr. Delong recommended she come to the ER for admission and IV ABX. Dr. Wahl is at the bedside evaluating and plans to take her to surgery tomorrow AM.  She is currently receiving Vanc and Cefepime.  BC have been drawn.  She is afebrile. Admit to Hospital Medicine. Keep NPO after MN for anticipated surgery tomorrow    "

## 2017-05-21 NOTE — PROGRESS NOTES
"[facility] Hospital Medicine  Progress Note    Patient Name: Josey Flores  MRN: [id number]  Patient Class: IP- Inpatient   Admission Date: 5/16/2017  Length of Stay: 5 days  Attending Physician: Aileen Barr MD  Primary Care Provider: Aileen Sadler MD    Subjective:     Principal Problem:Hidradenitis    HPI:  This is a 47 y/o CF with no PMH except +tobacco abuse until January of this year when she was diagnosed with Breast Cancer to the Right Breast.  Patient was scheduled for a mastectomy, but she was ultimately determined to be Stage IV with mets to the bone and lymph nodes, therefore surgery was cancelled.  Patient reports taking oral chemo daily and has been given 1.5-2 years.  She presented to Dr. Delong's office today secondary to c/o "infected and swollen lymph nodes under Right arm, and to left groin".  Dr. Delong recommended she come to the ER for admission and IV ABX. Dr. Wahl is at the bedside evaluating and plans to take her to surgery tomorrow AM.  She is currently receiving Vanc and Cefepime.  BC have been drawn.  She is afebrile. Admit to Hospital Medicine. Keep NPO after MN for anticipated surgery tomorrow      Hospital Course:  Pt underwent an I & D to bilateral axilla and left groin by Dr. Wahl 5/17/18. Areas have intact wound vac. She underwent wound vac change in OR on 5/19/17. Pt no longer on Vanc and Cefepime, presently receiving Zyvox.  She has pain only to right axilla with movement. Blood cultures NGTD. She will need to wound vac and will need skin grafts of the right and left axilla. Left groin will likely close on its own. Chemotherapy medications were held but later resumed 5/22/17.     Interval History: Denies complaints today. H/H stable.     Review of Systems   Constitutional: Negative for appetite change, chills, fatigue and fever.   HENT: Negative.  Negative for congestion, dental problem, ear pain, facial swelling, mouth sores, " nosebleeds, postnasal drip, sneezing and sore throat.    Eyes: Negative for pain, discharge, redness and itching.   Respiratory: Negative for cough, shortness of breath and wheezing.    Cardiovascular: Negative for chest pain and leg swelling.   Gastrointestinal: Negative for abdominal pain, diarrhea, nausea and vomiting.   Endocrine: Negative.    Genitourinary: Negative for dysuria and frequency.   Musculoskeletal: Positive for myalgias.   Skin: Positive for wound.   Allergic/Immunologic: Positive for immunocompromised state.   Neurological: Negative for dizziness, tremors, syncope, weakness, light-headedness and headaches.   Psychiatric/Behavioral: Negative for confusion, dysphoric mood and hallucinations. The patient is not hyperactive.      Objective:     Vital Signs (Most Recent):  Temp: 97.7 °F (36.5 °C) (05/21/17 1219)  Pulse: 80 (05/21/17 1219)  Resp: 18 (05/21/17 1219)  BP: (!) 105/52 (05/21/17 1219)  SpO2: 98 % (05/21/17 1219) Vital Signs (24h Range):  Temp:  [97.7 °F (36.5 °C)-98.6 °F (37 °C)] 97.7 °F (36.5 °C)  Pulse:  [67-83] 80  Resp:  [16-18] 18  SpO2:  [95 %-98 %] 98 %  BP: (105-122)/(50-64) 105/52     Weight: 93.9 kg (207 lb)  Body mass index is 32.42 kg/m².    Intake/Output Summary (Last 24 hours) at 05/21/17 1536  Last data filed at 05/21/17 1421   Gross per 24 hour   Intake             3190 ml   Output                0 ml   Net             3190 ml      Physical Exam  Constitutional: No distress.   Appears chronically ill   HENT:   Head: Normocephalic.   Left Ear: External ear normal.   Nose: Nose normal.   Mouth/Throat: Oropharynx is clear and moist and mucous membranes are normal. Mucous membranes are not pale. No oropharyngeal exudate.   Eyes: Pupils are equal, round, and reactive to light. Right eye exhibits no discharge. Left eye exhibits no discharge. No scleral icterus.   Neck: Normal range of motion. Neck supple.   Cardiovascular: Normal rate, regular rhythm and normal heart sounds.     Pulmonary/Chest: Effort normal.   Abdominal: Soft. Bowel sounds are normal.   Musculoskeletal: She exhibits no edema.   Skin:   There is a wound VAC to the right and left axilla with no leakage  There is a wound VAC to the left groin with no leakage   Vitals reviewed.    Significant Labs:   CBC:   Recent Labs  Lab 05/20/17  0709 05/21/17  0655   WBC 6.61 7.58   HGB 10.3* 10.1*   HCT 30.5* 29.7*    254     CMP:   Recent Labs  Lab 05/20/17  0709 05/21/17  0655    140   K 4.1 3.9    108   CO2 25 28   * 91   BUN 5* 5*   CREATININE 0.8 0.9   CALCIUM 8.3* 8.1*   PROT 6.3 6.0   ALBUMIN 2.9* 2.9*   BILITOT 0.2 0.1   ALKPHOS 84 76   AST 21 24   ALT 60* 50*   ANIONGAP 7* 4*   EGFRNONAA >60 >60       Significant Imaging:   Imaging Results    None           Assessment/Plan:      * Hidradenitis    -Patient has undergone excision of hidradenitis of the right and left axilla as well as the left groin on 5/17/17. Wound VAC was changed in the operating room 5/19/17 . She will undergo wound VAC changes on Monday Wednesdays and Fridays. Once a granulation bed has formed she will need skin grafts of the right and left axilla. Left groin is likely close on its own   -Will begin bedside wound VAC changes on Monday by the wound care nurse per General Surgery  -Continue IV Zyvoxx for now                 Malignant neoplasm of overlapping sites of right female breast    - Follows with Dr. Delong. Per patient she has Stage IV metastatic breast cancer to the bone and lymph system  -Takes PO Letrozole and Ibrance for chemotherapy. Currently on hold.  Plans to restart on Monday. She has her own supply and will use her own supply of Ibrance.   -Hematology following        Secondary cancer of bone    - cont current tx          Malignant neoplasm metastatic to lymph node of axilla    -Plan as above          VTE Risk Mitigation         Ordered     enoxaparin injection 40 mg  Every 24 hours (non-standard times)      Route:  Subcutaneous        05/17/17 1643     Medium Risk of VTE  Once      05/17/17 1643     Place sequential compression device  Until discontinued      05/17/17 1643          Denia Westbrook NP  Department of Hospital Medicine   [facility] -

## 2017-05-21 NOTE — HOSPITAL COURSE
Pt underwent an I & D to bilateral axilla and left groin by Dr. Wahl 5/17/18. Areas have intact wound vac. She underwent wound vac change in OR on 5/19/17. Pt no longer on Vanc and Cefepime, presently receiving Zyvox.  She has pain only to right axilla with movement. Blood cultures NGTD. She will need to wound vac and will need skin grafts of the right and left axilla. Left groin will likely close on its own. She will continue wound [facility] [place]. She will follow up with Dr. Seo in 2 weeks and this appt has been arranged. Chemotherapy medications were held but later resumed 5/22/17. Patient seen and examined on date of discharge and deemed suitable.

## 2017-05-21 NOTE — SUBJECTIVE & OBJECTIVE
Interval History: Denies complaints today. H/H stable.     Review of Systems   Constitutional: Negative for appetite change, chills, fatigue and fever.   HENT: Negative.  Negative for congestion, dental problem, ear pain, facial swelling, mouth sores, nosebleeds, postnasal drip, sneezing and sore throat.    Eyes: Negative for pain, discharge, redness and itching.   Respiratory: Negative for cough, shortness of breath and wheezing.    Cardiovascular: Negative for chest pain and leg swelling.   Gastrointestinal: Negative for abdominal pain, diarrhea, nausea and vomiting.   Endocrine: Negative.    Genitourinary: Negative for dysuria and frequency.   Musculoskeletal: Positive for myalgias.   Skin: Positive for wound.   Allergic/Immunologic: Positive for immunocompromised state.   Neurological: Negative for dizziness, tremors, syncope, weakness, light-headedness and headaches.   Psychiatric/Behavioral: Negative for confusion, dysphoric mood and hallucinations. The patient is not hyperactive.      Objective:     Vital Signs (Most Recent):  Temp: 97.7 °F (36.5 °C) (05/21/17 1219)  Pulse: 80 (05/21/17 1219)  Resp: 18 (05/21/17 1219)  BP: (!) 105/52 (05/21/17 1219)  SpO2: 98 % (05/21/17 1219) Vital Signs (24h Range):  Temp:  [97.7 °F (36.5 °C)-98.6 °F (37 °C)] 97.7 °F (36.5 °C)  Pulse:  [67-83] 80  Resp:  [16-18] 18  SpO2:  [95 %-98 %] 98 %  BP: (105-122)/(50-64) 105/52     Weight: 93.9 kg (207 lb)  Body mass index is 32.42 kg/m².    Intake/Output Summary (Last 24 hours) at 05/21/17 1536  Last data filed at 05/21/17 1421   Gross per 24 hour   Intake             3190 ml   Output                0 ml   Net             3190 ml      Physical Exam  Constitutional: No distress.   Appears chronically ill   HENT:   Head: Normocephalic.   Left Ear: External ear normal.   Nose: Nose normal.   Mouth/Throat: Oropharynx is clear and moist and mucous membranes are normal. Mucous membranes are not pale. No oropharyngeal exudate.   Eyes:  Pupils are equal, round, and reactive to light. Right eye exhibits no discharge. Left eye exhibits no discharge. No scleral icterus.   Neck: Normal range of motion. Neck supple.   Cardiovascular: Normal rate, regular rhythm and normal heart sounds.    Pulmonary/Chest: Effort normal.   Abdominal: Soft. Bowel sounds are normal.   Musculoskeletal: She exhibits no edema.   Skin:   There is a wound VAC to the right and left axilla with no leakage  There is a wound VAC to the left groin with no leakage   Vitals reviewed.    Significant Labs:   CBC:   Recent Labs  Lab 05/20/17  0709 05/21/17  0655   WBC 6.61 7.58   HGB 10.3* 10.1*   HCT 30.5* 29.7*    254     CMP:   Recent Labs  Lab 05/20/17  0709 05/21/17  0655    140   K 4.1 3.9    108   CO2 25 28   * 91   BUN 5* 5*   CREATININE 0.8 0.9   CALCIUM 8.3* 8.1*   PROT 6.3 6.0   ALBUMIN 2.9* 2.9*   BILITOT 0.2 0.1   ALKPHOS 84 76   AST 21 24   ALT 60* 50*   ANIONGAP 7* 4*   EGFRNONAA >60 >60       Significant Imaging:   Imaging Results    None

## 2017-05-21 NOTE — ASSESSMENT & PLAN NOTE
- Follows with Dr. Delong. Per patient she has Stage IV metastatic breast cancer to the bone and lymph system  -Takes PO Letrozole and Ibrance for chemotherapy. Currently on hold.  Plans to restart on Monday. She has her own supply and will use her own supply of Ibrance.   -Hematology following

## 2017-05-21 NOTE — SUBJECTIVE & OBJECTIVE
Interval History:  No events overnight    Oncology Treatment Plan:   [No treatment plan]    Medications:  Continuous Infusions:   lactated ringers 75 mL/hr at 05/21/17 0239     Scheduled Meds:   citalopram  20 mg Oral Daily    enoxparin  40 mg Subcutaneous Q24H    [START ON 5/23/2017] ergocalciferol  50,000 Units Oral Q7 Days    linezolid 600mg/300ml  600 mg Intravenous Q12H    multivitamin  1 tablet Oral Daily    pantoprazole  40 mg Oral Daily     PRN Meds:albuterol sulfate, alprazolam, aluminum-magnesium hydroxide-simethicone, dextromethorphan-guaifenesin  mg/5 ml, dextrose 50%, dextrose 50%, diphenhydrAMINE, glucagon (human recombinant), glucose, glucose, metoclopramide HCl, morphine, ondansetron, ondansetron, oxycodone-acetaminophen, oxycodone-acetaminophen, sodium chloride 0.9%, sodium chloride 0.9%     Review of Systems   Constitutional: Negative for appetite change, chills, fatigue and fever.   HENT: Negative.  Negative for congestion, dental problem, ear pain, facial swelling, mouth sores, nosebleeds, postnasal drip, sneezing and sore throat.    Eyes: Negative for pain, discharge, redness and itching.   Respiratory: Negative for cough, shortness of breath and wheezing.    Cardiovascular: Negative for chest pain and leg swelling.   Gastrointestinal: Negative for abdominal pain, diarrhea, nausea and vomiting.   Endocrine: Negative.    Genitourinary: Negative for dysuria and frequency.   Musculoskeletal: Positive for myalgias.   Skin: Positive for wound.   Allergic/Immunologic: Negative for immunocompromised state.   Neurological: Negative for dizziness, tremors, syncope, weakness, light-headedness and headaches.   Psychiatric/Behavioral: Negative for confusion, dysphoric mood and hallucinations. The patient is not hyperactive.      Objective:     Vital Signs (Most Recent):  Temp: 98 °F (36.7 °C) (05/21/17 0720)  Pulse: 72 (05/21/17 0720)  Resp: 18 (05/21/17 0720)  BP: 105/64 (05/21/17 0720)  SpO2:  96 % (05/21/17 0720) Vital Signs (24h Range):  Temp:  [97.7 °F (36.5 °C)-98.6 °F (37 °C)] 98 °F (36.7 °C)  Pulse:  [67-83] 72  Resp:  [16-18] 18  SpO2:  [94 %-96 %] 96 %  BP: (105-122)/(50-64) 105/64     Weight: 93.9 kg (207 lb)  Body mass index is 32.42 kg/m².  Body surface area is 2.11 meters squared.      Intake/Output Summary (Last 24 hours) at 05/21/17 1013  Last data filed at 05/21/17 0720   Gross per 24 hour   Intake             2825 ml   Output                0 ml   Net             2825 ml       Physical Exam   Constitutional: No distress.   HENT:   Head: Normocephalic.   Left Ear: External ear normal.   Nose: Nose normal.   Mouth/Throat: Oropharynx is clear and moist and mucous membranes are normal. Mucous membranes are not pale. No oropharyngeal exudate.   Eyes: Pupils are equal, round, and reactive to light. Right eye exhibits no discharge. Left eye exhibits no discharge. No scleral icterus.   Neck: Normal range of motion. Neck supple.   Cardiovascular: Normal rate, regular rhythm and normal heart sounds.    Pulmonary/Chest: Effort normal.   Abdominal: Soft. Bowel sounds are normal.   Musculoskeletal: She exhibits no edema or deformity.   Skin:   There is a wound VAC to the right and left axilla with no leakage  There is a wound VAC to the left groin with no leakage   Vitals reviewed.      Significant Labs:   CBC:     Recent Labs  Lab 05/20/17  0709 05/21/17  0655   WBC 6.61 7.58   HGB 10.3* 10.1*   HCT 30.5* 29.7*    254    and CMP:     Recent Labs  Lab 05/20/17  0709 05/21/17  0655    140   K 4.1 3.9    108   CO2 25 28   * 91   BUN 5* 5*   CREATININE 0.8 0.9   CALCIUM 8.3* 8.1*   PROT 6.3 6.0   ALBUMIN 2.9* 2.9*   BILITOT 0.2 0.1   ALKPHOS 84 76   AST 21 24   ALT 60* 50*   ANIONGAP 7* 4*   EGFRNONAA >60 >60       Diagnostic Results:  I have reviewed all pertinent imaging results/findings within the past 24 hours.

## 2017-05-21 NOTE — ASSESSMENT & PLAN NOTE
Metastatic breast cancer: On Letrozole and Ibrance. She started on Ibrance in 2/2017 and has completed 4 cycles thus far. Plan to restart letrozole and Ibrance on Monday 5/22/17.  She has her own supply and will use her own supply of Ibrance. No significant cytopenias with previous cycles.     --restart letrozole and Ibrance on Monday 5/22/17

## 2017-05-21 NOTE — PROGRESS NOTES
[facility] -   Hematology/Oncology  Progress Note    Patient Name: Josey Flores  Admission Date: 5/16/2017  Hospital Length of Stay: 5 days  Code Status: Full Code     Subjective:     HPI:  Josey Flores is a 46 y.o. female with metastatic breast cancer on chemotherapy admitted for management of multiple abscesses related to hidradenitis. She has reportedly had recurrent problems with hidradenitis for several years. She has been managed with outpatient antibiotics in the past. However, she was evaluated on 5/16 in [facility] clinic by Dr. Delong with complaint of worsening pain in her right axilla and left groin. She was referred for admission for surgical I&D of these abscesses. She was started on IV Cefepime and Linezolid. She was evaluated by surgery and underwent excision of skin, subcutaneous tissue and lymph nodes in the R axilla and L groin with wound vac placement today. She states she currently has a great deal of pain in the R axilla, but is doing well otherwise. No fevers.       Interval History:  No events overnight    Oncology Treatment Plan:   [No treatment plan]    Medications:  Continuous Infusions:   lactated ringers 75 mL/hr at 05/21/17 0239     Scheduled Meds:   citalopram  20 mg Oral Daily    enoxparin  40 mg Subcutaneous Q24H    [START ON 5/23/2017] ergocalciferol  50,000 Units Oral Q7 Days    linezolid 600mg/300ml  600 mg Intravenous Q12H    multivitamin  1 tablet Oral Daily    pantoprazole  40 mg Oral Daily     PRN Meds:albuterol sulfate, alprazolam, aluminum-magnesium hydroxide-simethicone, dextromethorphan-guaifenesin  mg/5 ml, dextrose 50%, dextrose 50%, diphenhydrAMINE, glucagon (human recombinant), glucose, glucose, metoclopramide HCl, morphine, ondansetron, ondansetron, oxycodone-acetaminophen, oxycodone-acetaminophen, sodium chloride 0.9%, sodium chloride 0.9%     Review of Systems   Constitutional: Negative for appetite change, chills, fatigue and  fever.   HENT: Negative.  Negative for congestion, dental problem, ear pain, facial swelling, mouth sores, nosebleeds, postnasal drip, sneezing and sore throat.    Eyes: Negative for pain, discharge, redness and itching.   Respiratory: Negative for cough, shortness of breath and wheezing.    Cardiovascular: Negative for chest pain and leg swelling.   Gastrointestinal: Negative for abdominal pain, diarrhea, nausea and vomiting.   Endocrine: Negative.    Genitourinary: Negative for dysuria and frequency.   Musculoskeletal: Positive for myalgias.   Skin: Positive for wound.   Allergic/Immunologic: Negative for immunocompromised state.   Neurological: Negative for dizziness, tremors, syncope, weakness, light-headedness and headaches.   Psychiatric/Behavioral: Negative for confusion, dysphoric mood and hallucinations. The patient is not hyperactive.      Objective:     Vital Signs (Most Recent):  Temp: 98 °F (36.7 °C) (05/21/17 0720)  Pulse: 72 (05/21/17 0720)  Resp: 18 (05/21/17 0720)  BP: 105/64 (05/21/17 0720)  SpO2: 96 % (05/21/17 0720) Vital Signs (24h Range):  Temp:  [97.7 °F (36.5 °C)-98.6 °F (37 °C)] 98 °F (36.7 °C)  Pulse:  [67-83] 72  Resp:  [16-18] 18  SpO2:  [94 %-96 %] 96 %  BP: (105-122)/(50-64) 105/64     Weight: 93.9 kg (207 lb)  Body mass index is 32.42 kg/m².  Body surface area is 2.11 meters squared.      Intake/Output Summary (Last 24 hours) at 05/21/17 1013  Last data filed at 05/21/17 0720   Gross per 24 hour   Intake             2825 ml   Output                0 ml   Net             2825 ml       Physical Exam   Constitutional: No distress.   HENT:   Head: Normocephalic.   Left Ear: External ear normal.   Nose: Nose normal.   Mouth/Throat: Oropharynx is clear and moist and mucous membranes are normal. Mucous membranes are not pale. No oropharyngeal exudate.   Eyes: Pupils are equal, round, and reactive to light. Right eye exhibits no discharge. Left eye exhibits no discharge. No scleral icterus.    Neck: Normal range of motion. Neck supple.   Cardiovascular: Normal rate, regular rhythm and normal heart sounds.    Pulmonary/Chest: Effort normal.   Abdominal: Soft. Bowel sounds are normal.   Musculoskeletal: She exhibits no edema or deformity.   Skin:   There is a wound VAC to the right and left axilla with no leakage  There is a wound VAC to the left groin with no leakage   Vitals reviewed.      Significant Labs:   CBC:     Recent Labs  Lab 05/20/17  0709 05/21/17  0655   WBC 6.61 7.58   HGB 10.3* 10.1*   HCT 30.5* 29.7*    254    and CMP:     Recent Labs  Lab 05/20/17  0709 05/21/17  0655    140   K 4.1 3.9    108   CO2 25 28   * 91   BUN 5* 5*   CREATININE 0.8 0.9   CALCIUM 8.3* 8.1*   PROT 6.3 6.0   ALBUMIN 2.9* 2.9*   BILITOT 0.2 0.1   ALKPHOS 84 76   AST 21 24   ALT 60* 50*   ANIONGAP 7* 4*   EGFRNONAA >60 >60       Diagnostic Results:  I have reviewed all pertinent imaging results/findings within the past 24 hours.         Assessment/Plan:     Malignant neoplasm of overlapping sites of right female breast    Metastatic breast cancer: On Letrozole and Ibrance. She started on Ibrance in 2/2017 and has completed 4 cycles thus far. Plan to restart letrozole and Ibrance on Monday 5/22/17.  She has her own supply and will use her own supply of Ibrance. No significant cytopenias with previous cycles.     --restart letrozole and Ibrance on Monday 5/22/17        * Hidradenitis    Right axillary and left inguinal abscesses: s/p surgical excision of skin, surrounding subcutaneous tissue - currently with wound vac in place.  --Linezolid            Thank you for your consult. I will follow-up with patient. Please contact us if you have any additional questions.     Arya Keith Jr, MD  Hematology/Oncology  [facility]

## 2017-05-21 NOTE — PLAN OF CARE
Pt remained free of injury/ falls. Fall precautions in place. Pain controlled with PRN medication. Pt tolerating regular diet. IVF maintained. IV antibiotics given as ordered. Wound vac to right axilla, left axilla, and left groin clean dry intact with good seal with wound vac. 0 ml output. Pt able to verbalize needs and wants. Bed in low, locked position, call light and personal items within reach of pt. No acute distress noted. Family at bedside. VSS. Will continue to monitor.

## 2017-05-21 NOTE — HPI
Josey Flores is a 46 y.o. female with metastatic breast cancer on chemotherapy admitted for management of multiple abscesses related to hidradenitis. She has reportedly had recurrent problems with hidradenitis for several years. She has been managed with outpatient antibiotics in the past. However, she was evaluated on 5/16 in [facility] clinic by Dr. Delong with complaint of worsening pain in her right axilla and left groin. She was referred for admission for surgical I&D of these abscesses. She was started on IV Cefepime and Linezolid. She was evaluated by surgery and underwent excision of skin, subcutaneous tissue and lymph nodes in the R axilla and L groin with wound vac placement today. She states she currently has a great deal of pain in the R axilla, but is doing well otherwise. No fevers.

## 2017-05-22 LAB
ALBUMIN SERPL BCP-MCNC: 2.9 G/DL
ALP SERPL-CCNC: 82 U/L
ALT SERPL W/O P-5'-P-CCNC: 127 U/L
ANION GAP SERPL CALC-SCNC: 5 MMOL/L
AST SERPL-CCNC: 137 U/L
BACTERIA BLD CULT: NORMAL
BASOPHILS # BLD AUTO: ABNORMAL K/UL
BASOPHILS NFR BLD: 4 %
BILIRUB SERPL-MCNC: 0.2 MG/DL
BUN SERPL-MCNC: 7 MG/DL
CALCIUM SERPL-MCNC: 8.2 MG/DL
CHLORIDE SERPL-SCNC: 107 MMOL/L
CO2 SERPL-SCNC: 28 MMOL/L
CREAT SERPL-MCNC: 0.9 MG/DL
DIFFERENTIAL METHOD: ABNORMAL
EOSINOPHIL # BLD AUTO: ABNORMAL K/UL
EOSINOPHIL NFR BLD: 1 %
ERYTHROCYTE [DISTWIDTH] IN BLOOD BY AUTOMATED COUNT: 15.4 %
EST. GFR  (AFRICAN AMERICAN): >60 ML/MIN/1.73 M^2
EST. GFR  (NON AFRICAN AMERICAN): >60 ML/MIN/1.73 M^2
GLUCOSE SERPL-MCNC: 105 MG/DL
HCT VFR BLD AUTO: 30.7 %
HGB BLD-MCNC: 10.3 G/DL
LYMPHOCYTES # BLD AUTO: ABNORMAL K/UL
LYMPHOCYTES NFR BLD: 53 %
MCH RBC QN AUTO: 35.2 PG
MCHC RBC AUTO-ENTMCNC: 33.6 %
MCV RBC AUTO: 105 FL
METAMYELOCYTES NFR BLD MANUAL: 1 %
MONOCYTES # BLD AUTO: ABNORMAL K/UL
MONOCYTES NFR BLD: 11 %
MYELOCYTES NFR BLD MANUAL: 1 %
NEUTROPHILS NFR BLD: 28 %
NEUTS BAND NFR BLD MANUAL: 1 %
PLATELET # BLD AUTO: 278 K/UL
PLATELET BLD QL SMEAR: ABNORMAL
PMV BLD AUTO: 10.1 FL
POTASSIUM SERPL-SCNC: 4 MMOL/L
PROT SERPL-MCNC: 6.1 G/DL
RBC # BLD AUTO: 2.93 M/UL
SODIUM SERPL-SCNC: 140 MMOL/L
WBC # BLD AUTO: 6.61 K/UL

## 2017-05-22 PROCEDURE — 80053 COMPREHEN METABOLIC PANEL: CPT

## 2017-05-22 PROCEDURE — 21400001 HC TELEMETRY ROOM

## 2017-05-22 PROCEDURE — 97608 NEG PRS WND THER NDME>50SQCM: CPT

## 2017-05-22 PROCEDURE — 63600175 PHARM REV CODE 636 W HCPCS: Performed by: INTERNAL MEDICINE

## 2017-05-22 PROCEDURE — 11000001 HC ACUTE MED/SURG PRIVATE ROOM

## 2017-05-22 PROCEDURE — 25000003 PHARM REV CODE 250: Performed by: EMERGENCY MEDICINE

## 2017-05-22 PROCEDURE — 25000003 PHARM REV CODE 250: Performed by: NURSE PRACTITIONER

## 2017-05-22 PROCEDURE — 27000325 HC DRESSING INFOVAC LARGE BLK

## 2017-05-22 PROCEDURE — 99232 SBSQ HOSP IP/OBS MODERATE 35: CPT | Mod: ,,, | Performed by: INTERNAL MEDICINE

## 2017-05-22 PROCEDURE — 25000003 PHARM REV CODE 250: Performed by: INTERNAL MEDICINE

## 2017-05-22 PROCEDURE — 36415 COLL VENOUS BLD VENIPUNCTURE: CPT

## 2017-05-22 PROCEDURE — 63600175 PHARM REV CODE 636 W HCPCS: Performed by: SURGERY

## 2017-05-22 PROCEDURE — 85027 COMPLETE CBC AUTOMATED: CPT

## 2017-05-22 PROCEDURE — 85007 BL SMEAR W/DIFF WBC COUNT: CPT

## 2017-05-22 RX ORDER — HYDROMORPHONE HYDROCHLORIDE 2 MG/ML
2 INJECTION, SOLUTION INTRAMUSCULAR; INTRAVENOUS; SUBCUTANEOUS
Status: DISCONTINUED | OUTPATIENT
Start: 2017-05-22 | End: 2017-05-25 | Stop reason: HOSPADM

## 2017-05-22 RX ADMIN — OXYCODONE HYDROCHLORIDE AND ACETAMINOPHEN 1 TABLET: 10; 325 TABLET ORAL at 05:05

## 2017-05-22 RX ADMIN — OXYCODONE HYDROCHLORIDE AND ACETAMINOPHEN 1 TABLET: 10; 325 TABLET ORAL at 11:05

## 2017-05-22 RX ADMIN — LINEZOLID 600 MG: 600 INJECTION, SOLUTION INTRAVENOUS at 02:05

## 2017-05-22 RX ADMIN — LINEZOLID 600 MG: 600 INJECTION, SOLUTION INTRAVENOUS at 03:05

## 2017-05-22 RX ADMIN — OXYCODONE HYDROCHLORIDE AND ACETAMINOPHEN 1 TABLET: 10; 325 TABLET ORAL at 01:05

## 2017-05-22 RX ADMIN — ENOXAPARIN SODIUM 40 MG: 100 INJECTION SUBCUTANEOUS at 11:05

## 2017-05-22 RX ADMIN — OXYCODONE HYDROCHLORIDE AND ACETAMINOPHEN 1 TABLET: 10; 325 TABLET ORAL at 06:05

## 2017-05-22 RX ADMIN — HYDROMORPHONE HYDROCHLORIDE 2 MG: 2 INJECTION, SOLUTION INTRAMUSCULAR; INTRAVENOUS; SUBCUTANEOUS at 12:05

## 2017-05-22 RX ADMIN — PANTOPRAZOLE SODIUM 40 MG: 40 TABLET, DELAYED RELEASE ORAL at 08:05

## 2017-05-22 RX ADMIN — THERA TABS 1 TABLET: TAB at 08:05

## 2017-05-22 RX ADMIN — CITALOPRAM HYDROBROMIDE 20 MG: 20 TABLET ORAL at 08:05

## 2017-05-22 RX ADMIN — MORPHINE SULFATE 4 MG: 4 INJECTION, SOLUTION INTRAMUSCULAR; INTRAVENOUS at 08:05

## 2017-05-22 RX ADMIN — MORPHINE SULFATE 4 MG: 4 INJECTION, SOLUTION INTRAMUSCULAR; INTRAVENOUS at 12:05

## 2017-05-22 NOTE — ASSESSMENT & PLAN NOTE
Metastatic breast cancer: On Letrozole and Ibrance. She started on Ibrance in 2/2017 and has completed 4 cycles thus far. Plan to restart letrozole and Ibrance on Monday 5/22/17.  She has her own supply and will use her own supply of Ibrance. No significant cytopenias with previous cycles.     --restart letrozole 2.5mg daily and Ibrance,125mg today  --monitor counts every other day

## 2017-05-22 NOTE — PLAN OF CARE
"NP for hospitalist indicates that patient is medically stable for discharge once wound vac arrives and HH agency has been obtained for patient.      Spoke with [facility] Wound Care Nurse, Viktoriya LANG, who states that patient required IV Dilaudid prior to wound vac change today and Wound Care Nurse concerned that HH will not be high enough level of care for patient to manage patient's needs.  Wound Care Nurse here stating that she will contact [facility] to determine how wound care supplies are ordered should patient be discharged home.      Contacted Kyara with [facility] who states that they are unable to accept patient "per their ."  Requested that their  call back this  with reason for denial.  Also, contacted [facility] and Saira with [facility], both indicating that they are unable to accept patient due to the complexity of the patient.  Today three other HH agencies ([facility] [place], [facility], and [facility]) also declined to accept patient as well.      Contacted Telma with [facility] who states that although [facility] is contracted with patient's insurance, wound care MD is not contracted with this insurance; thus, they will have to submit to patient's insurance which can take 7-14 days.  Also, contacted [facility] and spoke with wound care nurse, Paul, who states that [facility] does not accept any outside wound care referrals.         Contacted Maranda with [facility] who states that they submitted information to patient's insurance Friday (05/19/17) afternoon and are awaiting determination/auth.      Also, received call from Joel with insurance ([phone], Ext. [phone]).  Joel spoke with [facility] , Rajni JOE (who is aware of this case) and indicates that he will be taking case to insurance Medical Director for LTAC determination.      Met with patient and made her " aware of above.

## 2017-05-22 NOTE — SUBJECTIVE & OBJECTIVE
Interval History:  No events overnight    Oncology Treatment Plan:   [No treatment plan]    Medications:  Continuous Infusions:   lactated ringers 75 mL/hr at 05/21/17 2340     Scheduled Meds:   citalopram  20 mg Oral Daily    enoxparin  40 mg Subcutaneous Q24H    [START ON 5/23/2017] ergocalciferol  50,000 Units Oral Q7 Days    linezolid 600mg/300ml  600 mg Intravenous Q12H    multivitamin  1 tablet Oral Daily    pantoprazole  40 mg Oral Daily     PRN Meds:albuterol sulfate, alprazolam, aluminum-magnesium hydroxide-simethicone, dextromethorphan-guaifenesin  mg/5 ml, dextrose 50%, dextrose 50%, diphenhydrAMINE, glucagon (human recombinant), glucose, glucose, metoclopramide HCl, morphine, ondansetron, ondansetron, oxycodone-acetaminophen, oxycodone-acetaminophen, sodium chloride 0.9%, sodium chloride 0.9%     Review of Systems   Constitutional: Negative for appetite change, chills, fatigue and fever.   HENT: Negative.  Negative for congestion, dental problem, ear pain, facial swelling, mouth sores, nosebleeds, postnasal drip, sneezing and sore throat.    Eyes: Negative for pain, discharge, redness and itching.   Respiratory: Negative for cough, shortness of breath and wheezing.    Cardiovascular: Negative for chest pain and leg swelling.   Gastrointestinal: Negative for abdominal pain, diarrhea, nausea and vomiting.   Endocrine: Negative.    Genitourinary: Negative for dysuria and frequency.   Musculoskeletal: Positive for myalgias.   Skin: Positive for wound.   Allergic/Immunologic: Negative for immunocompromised state.   Neurological: Negative for dizziness, tremors, syncope, weakness, light-headedness and headaches.   Psychiatric/Behavioral: Negative for confusion, dysphoric mood and hallucinations. The patient is not hyperactive.      Objective:     Vital Signs (Most Recent):  Temp: 98.7 °F (37.1 °C) (05/22/17 0444)  Pulse: 67 (05/22/17 0444)  Resp: 17 (05/22/17 0444)  BP: 115/67 (05/22/17  0444)  SpO2: 96 % (05/22/17 0444) Vital Signs (24h Range):  Temp:  [97.6 °F (36.4 °C)-98.7 °F (37.1 °C)] 98.7 °F (37.1 °C)  Pulse:  [67-80] 67  Resp:  [16-18] 17  SpO2:  [96 %-100 %] 96 %  BP: (105-129)/(52-74) 115/67     Weight: 93.9 kg (207 lb)  Body mass index is 32.42 kg/m².  Body surface area is 2.11 meters squared.      Intake/Output Summary (Last 24 hours) at 05/22/17 0706  Last data filed at 05/22/17 0521   Gross per 24 hour   Intake          2753.75 ml   Output                0 ml   Net          2753.75 ml       Physical Exam   Constitutional: No distress.   HENT:   Head: Normocephalic.   Left Ear: External ear normal.   Nose: Nose normal.   Mouth/Throat: Oropharynx is clear and moist and mucous membranes are normal. Mucous membranes are not pale. No oropharyngeal exudate.   Eyes: Pupils are equal, round, and reactive to light. Right eye exhibits no discharge. Left eye exhibits no discharge. No scleral icterus.   Neck: Normal range of motion. Neck supple.   Cardiovascular: Normal rate, regular rhythm and normal heart sounds.  Exam reveals no gallop and no friction rub.    No murmur heard.  Pulmonary/Chest: Effort normal. No respiratory distress. She has no wheezes. She has no rales. She exhibits no tenderness.   Abdominal: Soft. Bowel sounds are normal.   Musculoskeletal: She exhibits no edema or deformity.   Skin:   There is a wound VAC to the right and left axilla with no leakage  There is a wound VAC to the left groin with no leakage   Psychiatric: She has a normal mood and affect. Thought content normal.   Vitals reviewed.    Interval History:  No events overnight    Oncology Treatment Plan:   [No treatment plan]    Medications:  Continuous Infusions:   lactated ringers 75 mL/hr at 05/21/17 2340     Scheduled Meds:   citalopram  20 mg Oral Daily    enoxparin  40 mg Subcutaneous Q24H    [START ON 5/23/2017] ergocalciferol  50,000 Units Oral Q7 Days    linezolid 600mg/300ml  600 mg Intravenous Q12H     multivitamin  1 tablet Oral Daily    pantoprazole  40 mg Oral Daily     PRN Meds:albuterol sulfate, alprazolam, aluminum-magnesium hydroxide-simethicone, dextromethorphan-guaifenesin  mg/5 ml, dextrose 50%, dextrose 50%, diphenhydrAMINE, glucagon (human recombinant), glucose, glucose, metoclopramide HCl, morphine, ondansetron, ondansetron, oxycodone-acetaminophen, oxycodone-acetaminophen, sodium chloride 0.9%, sodium chloride 0.9%     Review of Systems   Constitutional: Negative for appetite change, chills, fatigue and fever.   HENT: Negative.  Negative for congestion, dental problem, ear pain, facial swelling, mouth sores, nosebleeds, postnasal drip, sneezing and sore throat.    Eyes: Negative for pain, discharge, redness and itching.   Respiratory: Negative for cough, shortness of breath and wheezing.    Cardiovascular: Negative for chest pain and leg swelling.   Gastrointestinal: Negative for abdominal pain, diarrhea, nausea and vomiting.   Endocrine: Negative.    Genitourinary: Negative for dysuria and frequency.   Musculoskeletal: Positive for myalgias.   Skin: Positive for wound.   Allergic/Immunologic: Negative for immunocompromised state.   Neurological: Negative for dizziness, tremors, syncope, weakness, light-headedness and headaches.   Psychiatric/Behavioral: Negative for confusion, dysphoric mood and hallucinations. The patient is not hyperactive.      Objective:     Vital Signs (Most Recent):  Temp: 98.7 °F (37.1 °C) (05/22/17 0444)  Pulse: 67 (05/22/17 0444)  Resp: 17 (05/22/17 0444)  BP: 115/67 (05/22/17 0444)  SpO2: 96 % (05/22/17 0444) Vital Signs (24h Range):  Temp:  [97.6 °F (36.4 °C)-98.7 °F (37.1 °C)] 98.7 °F (37.1 °C)  Pulse:  [67-80] 67  Resp:  [16-18] 17  SpO2:  [96 %-100 %] 96 %  BP: (105-129)/(52-74) 115/67     Weight: 93.9 kg (207 lb)  Body mass index is 32.42 kg/m².  Body surface area is 2.11 meters squared.      Intake/Output Summary (Last 24 hours) at 05/22/17 0706  Last data  filed at 05/22/17 0521   Gross per 24 hour   Intake          2753.75 ml   Output                0 ml   Net          2753.75 ml       Physical Exam   Constitutional: No distress.   HENT:   Head: Normocephalic.   Left Ear: External ear normal.   Nose: Nose normal.   Mouth/Throat: Oropharynx is clear and moist and mucous membranes are normal. Mucous membranes are not pale. No oropharyngeal exudate.   Eyes: Pupils are equal, round, and reactive to light. Right eye exhibits no discharge. Left eye exhibits no discharge. No scleral icterus.   Neck: Normal range of motion. Neck supple.   Cardiovascular: Normal rate, regular rhythm and normal heart sounds.    Pulmonary/Chest: Effort normal.   Abdominal: Soft. Bowel sounds are normal.   Musculoskeletal: She exhibits no edema or deformity.   Skin:   There is a wound VAC to the right and left axilla with no leakage  There is a wound VAC to the left groin with no leakage   Vitals reviewed.      Significant Labs:   CBC:     Recent Labs  Lab 05/20/17  0709 05/21/17  0655 05/22/17  0453   WBC 6.61 7.58 6.61   HGB 10.3* 10.1* 10.3*   HCT 30.5* 29.7* 30.7*    254 278    and CMP:     Recent Labs  Lab 05/20/17  0709 05/21/17  0655 05/22/17  0453    140 140   K 4.1 3.9 4.0    108 107   CO2 25 28 28   * 91 105   BUN 5* 5* 7   CREATININE 0.8 0.9 0.9   CALCIUM 8.3* 8.1* 8.2*   PROT 6.3 6.0 6.1   ALBUMIN 2.9* 2.9* 2.9*   BILITOT 0.2 0.1 0.2   ALKPHOS 84 76 82   AST 21 24 137*   ALT 60* 50* 127*   ANIONGAP 7* 4* 5*   EGFRNONAA >60 >60 >60       Diagnostic Results:  I have reviewed all pertinent imaging results/findings within the past 24 hours.       Significant Labs:   CBC:     Recent Labs  Lab 05/20/17  0709 05/21/17  0655 05/22/17  0453   WBC 6.61 7.58 6.61   HGB 10.3* 10.1* 10.3*   HCT 30.5* 29.7* 30.7*    254 278    and CMP:     Recent Labs  Lab 05/20/17  0709 05/21/17  0655 05/22/17  0453    140 140   K 4.1 3.9 4.0    108 107   CO2 25 28 28    * 91 105   BUN 5* 5* 7   CREATININE 0.8 0.9 0.9   CALCIUM 8.3* 8.1* 8.2*   PROT 6.3 6.0 6.1   ALBUMIN 2.9* 2.9* 2.9*   BILITOT 0.2 0.1 0.2   ALKPHOS 84 76 82   AST 21 24 137*   ALT 60* 50* 127*   ANIONGAP 7* 4* 5*   EGFRNONAA >60 >60 >60       Diagnostic Results:  I have reviewed all pertinent imaging results/findings within the past 24 hours.

## 2017-05-22 NOTE — PLAN OF CARE
Problem: Patient Care Overview  Goal: Plan of Care Review  Outcome: Ongoing (interventions implemented as appropriate)  Wound poc reviewed with patient and family at bedside including vac changes and pain management, as well as discharge plan.

## 2017-05-22 NOTE — ASSESSMENT & PLAN NOTE
-Patient has undergone excision of hidradenitis of the right and left axilla as well as the left groin on 5/17/17. Wound VAC was changed in the operating room 5/19/17 . She will undergo wound VAC changes on Monday Wednesdays and Fridays. Once a granulation bed has formed she will need skin grafts of the right and left axilla. Left groin is likely close on its own   -Will begin bedside wound VAC changes on Today by the wound care nurse  -Continue IV Zyvoxx for now  -Awaiting wound vac and HH arrangements prior to discharge.

## 2017-05-22 NOTE — SUBJECTIVE & OBJECTIVE
Interval History: Awaiting  arrangement for wound vac. Patient denies complaints.    Review of Systems   Constitutional: Negative for appetite change, chills, fatigue and fever.   HENT: Negative.  Negative for congestion, dental problem, ear pain, facial swelling, mouth sores, nosebleeds, postnasal drip, sneezing and sore throat.    Eyes: Negative for pain, discharge, redness and itching.   Respiratory: Negative for cough, shortness of breath and wheezing.    Cardiovascular: Negative for chest pain and leg swelling.   Gastrointestinal: Negative for abdominal pain, diarrhea, nausea and vomiting.   Endocrine: Negative.    Genitourinary: Negative for dysuria and frequency.   Musculoskeletal: Positive for myalgias.   Skin: Positive for wound.   Allergic/Immunologic: Positive for immunocompromised state.   Neurological: Negative for dizziness, tremors, syncope, weakness, light-headedness and headaches.   Psychiatric/Behavioral: Negative for confusion, dysphoric mood and hallucinations. The patient is not hyperactive.       Objective:     Vital Signs (Most Recent):  Temp: 98.3 °F (36.8 °C) (05/22/17 0722)  Pulse: 80 (05/22/17 0722)  Resp: 16 (05/22/17 0722)  BP: 129/72 (05/22/17 0722)  SpO2: 96 % (05/22/17 0722) Vital Signs (24h Range):  Temp:  [97.6 °F (36.4 °C)-98.7 °F (37.1 °C)] 98.3 °F (36.8 °C)  Pulse:  [67-80] 80  Resp:  [16-18] 16  SpO2:  [96 %-100 %] 96 %  BP: (105-129)/(52-74) 129/72     Weight: 93.9 kg (207 lb)  Body mass index is 32.42 kg/m².    Intake/Output Summary (Last 24 hours) at 05/22/17 1136  Last data filed at 05/22/17 0521   Gross per 24 hour   Intake          2493.75 ml   Output                0 ml   Net          2493.75 ml      Physical Exam  Constitutional: No distress.   Appears chronically ill   HENT:   Head: Normocephalic.   Left Ear: External ear normal.   Nose: Nose normal.   Mouth/Throat: Oropharynx is clear and moist and mucous membranes are normal. Mucous membranes are not pale. No  oropharyngeal exudate.   Eyes: Pupils are equal, round, and reactive to light. Right eye exhibits no discharge. Left eye exhibits no discharge. No scleral icterus.   Neck: Normal range of motion. Neck supple.   Cardiovascular: Normal rate, regular rhythm and normal heart sounds.    Pulmonary/Chest: Effort normal.   Abdominal: Soft. Bowel sounds are normal.   Musculoskeletal: She exhibits no edema.   Skin:   There is a wound VAC to the right and left axilla with no leakage  There is a wound VAC to the left groin with no leakage   Vitals reviewed.    Significant Labs:   CBC:   Recent Labs  Lab 05/21/17  0655 05/22/17  0453   WBC 7.58 6.61   HGB 10.1* 10.3*   HCT 29.7* 30.7*    278     CMP:   Recent Labs  Lab 05/21/17  0655 05/22/17  0453    140   K 3.9 4.0    107   CO2 28 28   GLU 91 105   BUN 5* 7   CREATININE 0.9 0.9   CALCIUM 8.1* 8.2*   PROT 6.0 6.1   ALBUMIN 2.9* 2.9*   BILITOT 0.1 0.2   ALKPHOS 76 82   AST 24 137*   ALT 50* 127*   ANIONGAP 4* 5*   EGFRNONAA >60 >60       Significant Imaging:   Imaging Results    None

## 2017-05-22 NOTE — PROGRESS NOTES
[facility] -   Hematology/Oncology  Progress Note    Patient Name: Josey Flores  Admission Date: 5/16/2017  Hospital Length of Stay: 6 days  Code Status: Full Code     Subjective:     HPI:  Josey Flores is a 46 y.o. female with metastatic breast cancer on chemotherapy admitted for management of multiple abscesses related to hidradenitis. She has reportedly had recurrent problems with hidradenitis for several years. She has been managed with outpatient antibiotics in the past. However, she was evaluated on 5/16 in [facility] clinic by Dr. Delong with complaint of worsening pain in her right axilla and left groin. She was referred for admission for surgical I&D of these abscesses. She was started on IV Cefepime and Linezolid. She was evaluated by surgery and underwent excision of skin, subcutaneous tissue and lymph nodes in the R axilla and L groin with wound vac placement today. She states she currently has a great deal of pain in the R axilla, but is doing well otherwise. No fevers.       Interval History:  No events overnight    Oncology Treatment Plan:   [No treatment plan]    Medications:  Continuous Infusions:   lactated ringers 75 mL/hr at 05/21/17 2340     Scheduled Meds:   citalopram  20 mg Oral Daily    enoxparin  40 mg Subcutaneous Q24H    [START ON 5/23/2017] ergocalciferol  50,000 Units Oral Q7 Days    linezolid 600mg/300ml  600 mg Intravenous Q12H    multivitamin  1 tablet Oral Daily    pantoprazole  40 mg Oral Daily     PRN Meds:albuterol sulfate, alprazolam, aluminum-magnesium hydroxide-simethicone, dextromethorphan-guaifenesin  mg/5 ml, dextrose 50%, dextrose 50%, diphenhydrAMINE, glucagon (human recombinant), glucose, glucose, metoclopramide HCl, morphine, ondansetron, ondansetron, oxycodone-acetaminophen, oxycodone-acetaminophen, sodium chloride 0.9%, sodium chloride 0.9%     Review of Systems   Constitutional: Negative for appetite change, chills, fatigue and  fever.   HENT: Negative.  Negative for congestion, dental problem, ear pain, facial swelling, mouth sores, nosebleeds, postnasal drip, sneezing and sore throat.    Eyes: Negative for pain, discharge, redness and itching.   Respiratory: Negative for cough, shortness of breath and wheezing.    Cardiovascular: Negative for chest pain and leg swelling.   Gastrointestinal: Negative for abdominal pain, diarrhea, nausea and vomiting.   Endocrine: Negative.    Genitourinary: Negative for dysuria and frequency.   Musculoskeletal: Positive for myalgias.   Skin: Positive for wound.   Allergic/Immunologic: Negative for immunocompromised state.   Neurological: Negative for dizziness, tremors, syncope, weakness, light-headedness and headaches.   Psychiatric/Behavioral: Negative for confusion, dysphoric mood and hallucinations. The patient is not hyperactive.      Objective:     Vital Signs (Most Recent):  Temp: 98.7 °F (37.1 °C) (05/22/17 0444)  Pulse: 67 (05/22/17 0444)  Resp: 17 (05/22/17 0444)  BP: 115/67 (05/22/17 0444)  SpO2: 96 % (05/22/17 0444) Vital Signs (24h Range):  Temp:  [97.6 °F (36.4 °C)-98.7 °F (37.1 °C)] 98.7 °F (37.1 °C)  Pulse:  [67-80] 67  Resp:  [16-18] 17  SpO2:  [96 %-100 %] 96 %  BP: (105-129)/(52-74) 115/67     Weight: 93.9 kg (207 lb)  Body mass index is 32.42 kg/m².  Body surface area is 2.11 meters squared.      Intake/Output Summary (Last 24 hours) at 05/22/17 0706  Last data filed at 05/22/17 0521   Gross per 24 hour   Intake          2753.75 ml   Output                0 ml   Net          2753.75 ml       Physical Exam   Constitutional: No distress.   HENT:   Head: Normocephalic.   Left Ear: External ear normal.   Nose: Nose normal.   Mouth/Throat: Oropharynx is clear and moist and mucous membranes are normal. Mucous membranes are not pale. No oropharyngeal exudate.   Eyes: Pupils are equal, round, and reactive to light. Right eye exhibits no discharge. Left eye exhibits no discharge. No scleral  icterus.   Neck: Normal range of motion. Neck supple.   Cardiovascular: Normal rate, regular rhythm and normal heart sounds.  Exam reveals no gallop and no friction rub.    No murmur heard.  Pulmonary/Chest: Effort normal. No respiratory distress. She has no wheezes. She has no rales. She exhibits no tenderness.   Abdominal: Soft. Bowel sounds are normal.   Musculoskeletal: She exhibits no edema or deformity.   Skin:   There is a wound VAC to the right and left axilla with no leakage  There is a wound VAC to the left groin with no leakage   Psychiatric: She has a normal mood and affect. Thought content normal.   Vitals reviewed.    Interval History:  No events overnight    Oncology Treatment Plan:   [No treatment plan]    Medications:  Continuous Infusions:   lactated ringers 75 mL/hr at 05/21/17 2340     Scheduled Meds:   citalopram  20 mg Oral Daily    enoxparin  40 mg Subcutaneous Q24H    [START ON 5/23/2017] ergocalciferol  50,000 Units Oral Q7 Days    linezolid 600mg/300ml  600 mg Intravenous Q12H    multivitamin  1 tablet Oral Daily    pantoprazole  40 mg Oral Daily     PRN Meds:albuterol sulfate, alprazolam, aluminum-magnesium hydroxide-simethicone, dextromethorphan-guaifenesin  mg/5 ml, dextrose 50%, dextrose 50%, diphenhydrAMINE, glucagon (human recombinant), glucose, glucose, metoclopramide HCl, morphine, ondansetron, ondansetron, oxycodone-acetaminophen, oxycodone-acetaminophen, sodium chloride 0.9%, sodium chloride 0.9%     Review of Systems   Constitutional: Negative for appetite change, chills, fatigue and fever.   HENT: Negative.  Negative for congestion, dental problem, ear pain, facial swelling, mouth sores, nosebleeds, postnasal drip, sneezing and sore throat.    Eyes: Negative for pain, discharge, redness and itching.   Respiratory: Negative for cough, shortness of breath and wheezing.    Cardiovascular: Negative for chest pain and leg swelling.   Gastrointestinal: Negative for  abdominal pain, diarrhea, nausea and vomiting.   Endocrine: Negative.    Genitourinary: Negative for dysuria and frequency.   Musculoskeletal: Positive for myalgias.   Skin: Positive for wound.   Allergic/Immunologic: Negative for immunocompromised state.   Neurological: Negative for dizziness, tremors, syncope, weakness, light-headedness and headaches.   Psychiatric/Behavioral: Negative for confusion, dysphoric mood and hallucinations. The patient is not hyperactive.      Objective:     Vital Signs (Most Recent):  Temp: 98.7 °F (37.1 °C) (05/22/17 0444)  Pulse: 67 (05/22/17 0444)  Resp: 17 (05/22/17 0444)  BP: 115/67 (05/22/17 0444)  SpO2: 96 % (05/22/17 0444) Vital Signs (24h Range):  Temp:  [97.6 °F (36.4 °C)-98.7 °F (37.1 °C)] 98.7 °F (37.1 °C)  Pulse:  [67-80] 67  Resp:  [16-18] 17  SpO2:  [96 %-100 %] 96 %  BP: (105-129)/(52-74) 115/67     Weight: 93.9 kg (207 lb)  Body mass index is 32.42 kg/m².  Body surface area is 2.11 meters squared.      Intake/Output Summary (Last 24 hours) at 05/22/17 0706  Last data filed at 05/22/17 0521   Gross per 24 hour   Intake          2753.75 ml   Output                0 ml   Net          2753.75 ml       Physical Exam   Constitutional: No distress.   HENT:   Head: Normocephalic.   Left Ear: External ear normal.   Nose: Nose normal.   Mouth/Throat: Oropharynx is clear and moist and mucous membranes are normal. Mucous membranes are not pale. No oropharyngeal exudate.   Eyes: Pupils are equal, round, and reactive to light. Right eye exhibits no discharge. Left eye exhibits no discharge. No scleral icterus.   Neck: Normal range of motion. Neck supple.   Cardiovascular: Normal rate, regular rhythm and normal heart sounds.    Pulmonary/Chest: Effort normal.   Abdominal: Soft. Bowel sounds are normal.   Musculoskeletal: She exhibits no edema or deformity.   Skin:   There is a wound VAC to the right and left axilla with no leakage  There is a wound VAC to the left groin with no  leakage   Vitals reviewed.      Significant Labs:   CBC:     Recent Labs  Lab 05/20/17  0709 05/21/17  0655 05/22/17  0453   WBC 6.61 7.58 6.61   HGB 10.3* 10.1* 10.3*   HCT 30.5* 29.7* 30.7*    254 278    and CMP:     Recent Labs  Lab 05/20/17  0709 05/21/17  0655 05/22/17  0453    140 140   K 4.1 3.9 4.0    108 107   CO2 25 28 28   * 91 105   BUN 5* 5* 7   CREATININE 0.8 0.9 0.9   CALCIUM 8.3* 8.1* 8.2*   PROT 6.3 6.0 6.1   ALBUMIN 2.9* 2.9* 2.9*   BILITOT 0.2 0.1 0.2   ALKPHOS 84 76 82   AST 21 24 137*   ALT 60* 50* 127*   ANIONGAP 7* 4* 5*   EGFRNONAA >60 >60 >60       Diagnostic Results:  I have reviewed all pertinent imaging results/findings within the past 24 hours.       Significant Labs:   CBC:     Recent Labs  Lab 05/20/17  0709 05/21/17  0655 05/22/17  0453   WBC 6.61 7.58 6.61   HGB 10.3* 10.1* 10.3*   HCT 30.5* 29.7* 30.7*    254 278    and CMP:     Recent Labs  Lab 05/20/17  0709 05/21/17  0655 05/22/17  0453    140 140   K 4.1 3.9 4.0    108 107   CO2 25 28 28   * 91 105   BUN 5* 5* 7   CREATININE 0.8 0.9 0.9   CALCIUM 8.3* 8.1* 8.2*   PROT 6.3 6.0 6.1   ALBUMIN 2.9* 2.9* 2.9*   BILITOT 0.2 0.1 0.2   ALKPHOS 84 76 82   AST 21 24 137*   ALT 60* 50* 127*   ANIONGAP 7* 4* 5*   EGFRNONAA >60 >60 >60       Diagnostic Results:  I have reviewed all pertinent imaging results/findings within the past 24 hours.         Assessment/Plan:     Malignant neoplasm of overlapping sites of right female breast    Metastatic breast cancer: On Letrozole and Ibrance. She started on Ibrance in 2/2017 and has completed 4 cycles thus far. Plan to restart letrozole and Ibrance on Monday 5/22/17.  She has her own supply and will use her own supply of Ibrance. No significant cytopenias with previous cycles.     --restart letrozole 2.5mg daily and Ibrance,125mg today  --monitor counts every other day        * Hidradenitis    Right axillary and left inguinal abscesses: s/p  surgical excision of skin, surrounding subcutaneous tissue - currently with wound vac in place.  --Linezolid            Thank you for your consult. I will follow-up with patient. Please contact us if you have any additional questions.     Arya Keith Jr, MD  Hematology/Oncology  [facility]

## 2017-05-22 NOTE — PROGRESS NOTES
"[facility] Hospital Medicine  Progress Note    Patient Name: Josey Flores  MRN: [id number]  Patient Class: IP- Inpatient   Admission Date: 5/16/2017  Length of Stay: 6 days  Attending Physician: Aileen Barr MD  Primary Care Provider: Aileen Sadler MD    Subjective:     Principal Problem:Hidradenitis    HPI:  This is a 47 y/o CF with no PMH except +tobacco abuse until January of this year when she was diagnosed with Breast Cancer to the Right Breast.  Patient was scheduled for a mastectomy, but she was ultimately determined to be Stage IV with mets to the bone and lymph nodes, therefore surgery was cancelled.  Patient reports taking oral chemo daily and has been given 1.5-2 years.  She presented to Dr. Delong's office today secondary to c/o "infected and swollen lymph nodes under Right arm, and to left groin".  Dr. Delong recommended she come to the ER for admission and IV ABX. Dr. Wahl is at the bedside evaluating and plans to take her to surgery tomorrow AM.  She is currently receiving Vanc and Cefepime.  BC have been drawn.  She is afebrile. Admit to Hospital Medicine. Keep NPO after MN for anticipated surgery tomorrow      Hospital Course:  Pt underwent an I & D to bilateral axilla and left groin by Dr. Wahl 5/17/18. Areas have intact wound vac. She underwent wound vac change in OR on 5/19/17. Pt no longer on Vanc and Cefepime, presently receiving Zyvox.  She has pain only to right axilla with movement. Blood cultures NGTD. She will need to wound vac and will need skin grafts of the right and left axilla. Left groin will likely close on its own. Chemotherapy medications were held but later resumed 5/22/17.     Interval History: Awaiting  arrangement for wound vac. Patient denies complaints.    Review of Systems   Constitutional: Negative for appetite change, chills, fatigue and fever.   HENT: Negative.  Negative for congestion, dental problem, ear pain, facial " swelling, mouth sores, nosebleeds, postnasal drip, sneezing and sore throat.    Eyes: Negative for pain, discharge, redness and itching.   Respiratory: Negative for cough, shortness of breath and wheezing.    Cardiovascular: Negative for chest pain and leg swelling.   Gastrointestinal: Negative for abdominal pain, diarrhea, nausea and vomiting.   Endocrine: Negative.    Genitourinary: Negative for dysuria and frequency.   Musculoskeletal: Positive for myalgias.   Skin: Positive for wound.   Allergic/Immunologic: Positive for immunocompromised state.   Neurological: Negative for dizziness, tremors, syncope, weakness, light-headedness and headaches.   Psychiatric/Behavioral: Negative for confusion, dysphoric mood and hallucinations. The patient is not hyperactive.       Objective:     Vital Signs (Most Recent):  Temp: 98.3 °F (36.8 °C) (05/22/17 0722)  Pulse: 80 (05/22/17 0722)  Resp: 16 (05/22/17 0722)  BP: 129/72 (05/22/17 0722)  SpO2: 96 % (05/22/17 0722) Vital Signs (24h Range):  Temp:  [97.6 °F (36.4 °C)-98.7 °F (37.1 °C)] 98.3 °F (36.8 °C)  Pulse:  [67-80] 80  Resp:  [16-18] 16  SpO2:  [96 %-100 %] 96 %  BP: (105-129)/(52-74) 129/72     Weight: 93.9 kg (207 lb)  Body mass index is 32.42 kg/m².    Intake/Output Summary (Last 24 hours) at 05/22/17 1136  Last data filed at 05/22/17 0521   Gross per 24 hour   Intake          2493.75 ml   Output                0 ml   Net          2493.75 ml      Physical Exam  Constitutional: No distress.   Appears chronically ill   HENT:   Head: Normocephalic.   Left Ear: External ear normal.   Nose: Nose normal.   Mouth/Throat: Oropharynx is clear and moist and mucous membranes are normal. Mucous membranes are not pale. No oropharyngeal exudate.   Eyes: Pupils are equal, round, and reactive to light. Right eye exhibits no discharge. Left eye exhibits no discharge. No scleral icterus.   Neck: Normal range of motion. Neck supple.   Cardiovascular: Normal rate, regular rhythm and  normal heart sounds.    Pulmonary/Chest: Effort normal.   Abdominal: Soft. Bowel sounds are normal.   Musculoskeletal: She exhibits no edema.   Skin:   There is a wound VAC to the right and left axilla with no leakage  There is a wound VAC to the left groin with no leakage   Vitals reviewed.    Significant Labs:   CBC:   Recent Labs  Lab 05/21/17  0655 05/22/17  0453   WBC 7.58 6.61   HGB 10.1* 10.3*   HCT 29.7* 30.7*    278     CMP:   Recent Labs  Lab 05/21/17  0655 05/22/17  0453    140   K 3.9 4.0    107   CO2 28 28   GLU 91 105   BUN 5* 7   CREATININE 0.9 0.9   CALCIUM 8.1* 8.2*   PROT 6.0 6.1   ALBUMIN 2.9* 2.9*   BILITOT 0.1 0.2   ALKPHOS 76 82   AST 24 137*   ALT 50* 127*   ANIONGAP 4* 5*   EGFRNONAA >60 >60       Significant Imaging:   Imaging Results    None           Assessment/Plan:      * Hidradenitis    -Patient has undergone excision of hidradenitis of the right and left axilla as well as the left groin on 5/17/17. Wound VAC was changed in the operating room 5/19/17 . She will undergo wound VAC changes on Monday Wednesdays and Fridays. Once a granulation bed has formed she will need skin grafts of the right and left axilla. Left groin is likely close on its own   -Will begin bedside wound VAC changes on Today by the wound care nurse  -Continue IV Zyvoxx for now  -Awaiting wound vac and HH arrangements prior to discharge.                Malignant neoplasm of overlapping sites of right female breast    - Follows with Dr. Delong. Per patient she has Stage IV metastatic breast cancer to the bone and lymph system  -Takes PO Letrozole and Ibrance for chemotherapy. Currently on hold.  Plans to restart on Monday. She has her own supply and will use her own supply of Ibrance.   -Hematology following        Secondary cancer of bone    - cont current tx          Malignant neoplasm metastatic to lymph node of axilla    -Plan as above          VTE Risk Mitigation         Ordered     enoxaparin  injection 40 mg  Every 24 hours (non-standard times)     Route:  Subcutaneous        05/17/17 1643     Medium Risk of VTE  Once      05/17/17 1643     Place sequential compression device  Until discontinued      05/17/17 1643          Denia Westbrook NP  Department of Hospital Medicine   [facility]

## 2017-05-22 NOTE — PROGRESS NOTES
05/22/17 1408   WOCN Assessment   WOCN Total Time (mins) 150   Visit Date 05/22/17   Visit Time 1130   Consult Type New   WOCN Speciality Wound   WOCN List wound vac   Wound surgical   Number of Wounds 3   Procedure wound vac   Intervention assessed;changed;team conference;orders   Teaching on-going;complication;discharge     0900 Wound vac change scheduled for today.  Spoke with Dr. Wahl this morning regarding this patient and he has ordered pain medication for vac change. I spoke with primary nurse EMILY Wang RN regarding administering prn Dilaudid for vac change, and she states patient recently medication with prn Morphine. I will return when she is able to receive dilaudid.   12:00 Primary nurse EMILY Wang RN at bedside to administer prn Dilaudid for wound vac change. Patient currently rates pain 7/10 prior to pain medication.  Patient's  to remain at bedside for support during wound vac dressing change.    Right Axilla drape removed using adhesive remover pads, black foam saturated with sterile normal saline, and removed.  Wound bed pink and moist with some granulation tissue noted and scant oozing.  Irrigated wound bed with sterile normal saline.  Intact bandar wound skin painted with cavilon skin barrier.  Areas of blistering noted under previous drape, and 2x1 cm open blister medial to site covered with mepilex non bordered foam. 1 piece Black Granufoam cut to size and placed in wound bed, and secured with drape.  Small bridge created with additional granufoam medially from wound, and covered with more drape.  SensaTRAC pad and tubing applied to this bridge.    Left Axilla drape removed using adhesive remover pads, black foam saturated with sterile normal saline, and removed. Wound bed pink and moist with some granulation tissue noted and scant oozing. Irrigated wound bed with sterile normal saline. Intact bandar wound skin painted with cavilon skin barrier. Areas of blistering again noted under  previous drape, and sites protected with mepilex non bordered foam.  2 pieces black granufoam cut to size and placed in wound bed, and secured with drape. SensaTRAC pad and tubing applied.  Left groin drape removed using adhesive remover pads.  Black foam saturated with sterile normal saline, and removed.  Wond bed pink and moist with some granulation tissue noted and scant oozing.  Irrigated wound bed with sterile normal saline. Intact bandar wound skin painted with cavilon skin barrier. Areas of blistering again noted under previous drape, and sites protected with mepilex non bordered foam.  1 piece black granufoam cut to size and placed in wound bed and secured with drape. SensaTRAC pad and tubing applied.    All 3 wounds connected via Y-connectors x2 to VAC ulta machine and machine turned on to -125 mmHg low intensity suction.  Good seal noted with no air leak.   Primary nurse EMILY Wang RN notified at completing of vac change of patient's increased pain and request for more pain medicaiton.   Despite premedication for pain, patient suffered intense pain during vac change, especially to right axilla. She tolerated this pain moderately well, but she will benefit from LTAC/SNF for pain management with vac changes. I  spoke with DAYANNA Jorgensen who is patient's , and she is currently working on a plan for wound vac changes at discharge, awaiting insurance authorization. Please see below for wound care recommendations and pictures:    Reportable conditions for Patients utilizing Negative Pressure Wound Vac Therapy :  1.Loss of seal, raised foam, or wound drainage suddenly decreasing in amount or stopping   2.Bright red blood or rapid filling of the canister  3.Periwound erythema, heat, edema, pain, elevated temperature and white blood cell count, cloudy or foul-smelling wound drainage, excess bleeding,  macerated periwound skin  4.Wound drainage becoming foul smelling and cloudy  5. Inability to trouble shoot  alarm for greater than two hours    Primary Nurse Assessment should include the following  1. Document system patency  2. Amount and description of wound fluid  3. Pain level  4. Tolerance of NPWT

## 2017-05-22 NOTE — PLAN OF CARE
Problem: Patient Care Overview  Goal: Plan of Care Review  Outcome: Ongoing (interventions implemented as appropriate)  Room air, respirations even and unlabored, no distress noted on assessment, pain, no nausea or shortness of breath, bilateral axillary wound vac, bilateral wound vac, refused bed alarm, telemetry

## 2017-05-22 NOTE — PROGRESS NOTES
Referrals sent to [facility], [facility], [facility], and [facility] through [facility].   1530 referrals declined by [facility], [facility] and [facility].

## 2017-05-23 LAB
ANION GAP SERPL CALC-SCNC: 7 MMOL/L
BASOPHILS # BLD AUTO: 0.02 K/UL
BASOPHILS NFR BLD: 0.3 %
BUN SERPL-MCNC: 7 MG/DL
CALCIUM SERPL-MCNC: 8.7 MG/DL
CHLORIDE SERPL-SCNC: 104 MMOL/L
CO2 SERPL-SCNC: 29 MMOL/L
CREAT SERPL-MCNC: 1 MG/DL
DIFFERENTIAL METHOD: ABNORMAL
EOSINOPHIL # BLD AUTO: 0.2 K/UL
EOSINOPHIL NFR BLD: 3.2 %
ERYTHROCYTE [DISTWIDTH] IN BLOOD BY AUTOMATED COUNT: 17.2 %
EST. GFR  (AFRICAN AMERICAN): >60 ML/MIN/1.73 M^2
EST. GFR  (NON AFRICAN AMERICAN): >60 ML/MIN/1.73 M^2
GLUCOSE SERPL-MCNC: 99 MG/DL
HCT VFR BLD AUTO: 34.3 %
HGB BLD-MCNC: 11.5 G/DL
LYMPHOCYTES # BLD AUTO: 1.4 K/UL
LYMPHOCYTES NFR BLD: 21.2 %
MCH RBC QN AUTO: 31.5 PG
MCHC RBC AUTO-ENTMCNC: 33.5 %
MCV RBC AUTO: 94 FL
MONOCYTES # BLD AUTO: 0.7 K/UL
MONOCYTES NFR BLD: 10 %
NEUTROPHILS # BLD AUTO: 4.4 K/UL
NEUTROPHILS NFR BLD: 65.3 %
PLATELET # BLD AUTO: 347 K/UL
PMV BLD AUTO: 9 FL
POTASSIUM SERPL-SCNC: 4.2 MMOL/L
RBC # BLD AUTO: 3.65 M/UL
SODIUM SERPL-SCNC: 140 MMOL/L
WBC # BLD AUTO: 6.79 K/UL

## 2017-05-23 PROCEDURE — 21400001 HC TELEMETRY ROOM

## 2017-05-23 PROCEDURE — 36415 COLL VENOUS BLD VENIPUNCTURE: CPT

## 2017-05-23 PROCEDURE — 99232 SBSQ HOSP IP/OBS MODERATE 35: CPT | Mod: ,,, | Performed by: INTERNAL MEDICINE

## 2017-05-23 PROCEDURE — 25000003 PHARM REV CODE 250: Performed by: EMERGENCY MEDICINE

## 2017-05-23 PROCEDURE — 85025 COMPLETE CBC W/AUTO DIFF WBC: CPT

## 2017-05-23 PROCEDURE — 80048 BASIC METABOLIC PNL TOTAL CA: CPT

## 2017-05-23 PROCEDURE — 63600175 PHARM REV CODE 636 W HCPCS: Performed by: INTERNAL MEDICINE

## 2017-05-23 PROCEDURE — 25000003 PHARM REV CODE 250: Performed by: SURGERY

## 2017-05-23 PROCEDURE — 25000003 PHARM REV CODE 250: Performed by: INTERNAL MEDICINE

## 2017-05-23 PROCEDURE — 25000003 PHARM REV CODE 250: Performed by: NURSE PRACTITIONER

## 2017-05-23 PROCEDURE — 63600175 PHARM REV CODE 636 W HCPCS: Performed by: SURGERY

## 2017-05-23 RX ADMIN — SODIUM CHLORIDE, SODIUM LACTATE, POTASSIUM CHLORIDE, AND CALCIUM CHLORIDE 1000 ML: .6; .31; .03; .02 INJECTION, SOLUTION INTRAVENOUS at 12:05

## 2017-05-23 RX ADMIN — LINEZOLID 600 MG: 600 INJECTION, SOLUTION INTRAVENOUS at 02:05

## 2017-05-23 RX ADMIN — THERA TABS 1 TABLET: TAB at 09:05

## 2017-05-23 RX ADMIN — ERGOCALCIFEROL 50000 UNITS: 1.25 CAPSULE ORAL at 09:05

## 2017-05-23 RX ADMIN — PANTOPRAZOLE SODIUM 40 MG: 40 TABLET, DELAYED RELEASE ORAL at 09:05

## 2017-05-23 RX ADMIN — OXYCODONE HYDROCHLORIDE AND ACETAMINOPHEN 1 TABLET: 5; 325 TABLET ORAL at 09:05

## 2017-05-23 RX ADMIN — ENOXAPARIN SODIUM 40 MG: 100 INJECTION SUBCUTANEOUS at 12:05

## 2017-05-23 RX ADMIN — CITALOPRAM HYDROBROMIDE 20 MG: 20 TABLET ORAL at 09:05

## 2017-05-23 RX ADMIN — OXYCODONE HYDROCHLORIDE AND ACETAMINOPHEN 1 TABLET: 10; 325 TABLET ORAL at 03:05

## 2017-05-23 RX ADMIN — OXYCODONE HYDROCHLORIDE AND ACETAMINOPHEN 1 TABLET: 10; 325 TABLET ORAL at 09:05

## 2017-05-23 RX ADMIN — LINEZOLID 600 MG: 600 INJECTION, SOLUTION INTRAVENOUS at 03:05

## 2017-05-23 NOTE — SUBJECTIVE & OBJECTIVE
Interval History:   No events overnight    Oncology Treatment Plan:   [No treatment plan]    Medications:  Continuous Infusions:   lactated ringers 75 mL/hr at 05/21/17 2340     Scheduled Meds:   citalopram  20 mg Oral Daily    enoxparin  40 mg Subcutaneous Q24H    ergocalciferol  50,000 Units Oral Q7 Days    linezolid 600mg/300ml  600 mg Intravenous Q12H    multivitamin  1 tablet Oral Daily    palbociclib  125 mg Oral Daily    pantoprazole  40 mg Oral Daily     PRN Meds:albuterol sulfate, alprazolam, aluminum-magnesium hydroxide-simethicone, dextromethorphan-guaifenesin  mg/5 ml, dextrose 50%, dextrose 50%, diphenhydrAMINE, glucagon (human recombinant), glucose, glucose, HYDROmorphone, metoclopramide HCl, morphine, ondansetron, ondansetron, oxycodone-acetaminophen, oxycodone-acetaminophen, sodium chloride 0.9%, sodium chloride 0.9%     Review of Systems   Constitutional: Negative for appetite change, chills, fatigue and fever.   HENT: Negative.  Negative for congestion, dental problem, ear pain, facial swelling, mouth sores, nosebleeds, postnasal drip, sneezing and sore throat.    Eyes: Negative for pain, discharge, redness and itching.   Respiratory: Negative for cough, shortness of breath and wheezing.    Cardiovascular: Negative for chest pain and leg swelling.   Gastrointestinal: Negative for abdominal pain, diarrhea, nausea and vomiting.   Endocrine: Negative.    Genitourinary: Negative for dysuria and frequency.   Musculoskeletal: Positive for myalgias.   Skin: Positive for wound.   Allergic/Immunologic: Negative for environmental allergies, food allergies and immunocompromised state.   Neurological: Negative for dizziness, tremors, syncope, weakness, light-headedness and headaches.   Psychiatric/Behavioral: Negative for confusion, dysphoric mood and hallucinations. The patient is not hyperactive.      Objective:     Vital Signs (Most Recent):  Temp: 99.1 °F (37.3 °C) (05/23/17 0454)  Pulse: 73  (05/23/17 0454)  Resp: 16 (05/23/17 0454)  BP: 106/60 (05/23/17 0454)  SpO2: 95 % (05/23/17 0454) Vital Signs (24h Range):  Temp:  [97.7 °F (36.5 °C)-99.1 °F (37.3 °C)] 99.1 °F (37.3 °C)  Pulse:  [72-82] 73  Resp:  [16-20] 16  SpO2:  [95 %-98 %] 95 %  BP: (104-145)/(56-86) 106/60     Weight: 93.9 kg (207 lb)  Body mass index is 32.42 kg/m².  Body surface area is 2.11 meters squared.      Intake/Output Summary (Last 24 hours) at 05/23/17 0704  Last data filed at 05/23/17 0526   Gross per 24 hour   Intake          3058.75 ml   Output                0 ml   Net          3058.75 ml       Physical Exam   Constitutional: No distress.   HENT:   Head: Normocephalic.   Left Ear: External ear normal.   Nose: Nose normal.   Mouth/Throat: Oropharynx is clear and moist and mucous membranes are normal. Mucous membranes are not pale. No oropharyngeal exudate.   Eyes: Pupils are equal, round, and reactive to light. Right eye exhibits no discharge. Left eye exhibits no discharge.   Neck: Normal range of motion. Neck supple.   Cardiovascular: Normal rate, regular rhythm and normal heart sounds.  Exam reveals no gallop and no friction rub.    No murmur heard.  Pulmonary/Chest: Effort normal. No respiratory distress. She has no wheezes. She has no rales.   Abdominal: Soft. Bowel sounds are normal. She exhibits no distension. There is no tenderness.   Musculoskeletal: She exhibits no edema, tenderness or deformity.   Skin:   There is a wound VAC to the right and left axilla with no leakage  There is a wound VAC to the left groin with no leakage   Psychiatric: She has a normal mood and affect. Thought content normal.   Vitals reviewed.      Significant Labs:   CBC:   Recent Labs  Lab 05/22/17 0453 05/23/17 0511   WBC 6.61 6.79   HGB 10.3* 11.5*   HCT 30.7* 34.3*    347   , CMP:   Recent Labs  Lab 05/22/17 0453      K 4.0      CO2 28      BUN 7   CREATININE 0.9   CALCIUM 8.2*   PROT 6.1   ALBUMIN 2.9*   BILITOT  0.2   ALKPHOS 82   *   *   ANIONGAP 5*   EGFRNONAA >60   , LDH: No results for input(s): LDHCSF, BFSOURCE in the last 48 hours. and LFTs:   Recent Labs  Lab 05/22/17  0453   *   *   ALKPHOS 82   BILITOT 0.2   PROT 6.1   ALBUMIN 2.9*       Diagnostic Results:  I have reviewed all pertinent imaging results/findings within the past 24 hours.

## 2017-05-23 NOTE — HOSPITAL COURSE
Patient will be taken to the operating room today for debridement of the hidradenitis of the right axilla and groin.  We will also debride the left axilla to prevent future infections.    Wound VAC will be placed in the axillas and possibly the groin.  Skin graft being needed in the future was discussed    POD 1  Wound vac in place  Case management ask to arrange home health for wound vac change  Wound vac change in the OR tomorrow    Postop day 2  Slightly more pain.  To the operating room for wound VAC change    POD day 3:  Much improved. Will change wound VAC on Monday and discharge.    Postop day 4  Pain is improving.  Plans for LTAC.  Antibiotics per medicine.  Okay to restart chemotherapy      POD 5  Less pain  Wound vac change today  LTAC today vs discharge and office follow up      POD 6  Less pain with wound vac changes

## 2017-05-23 NOTE — ASSESSMENT & PLAN NOTE
Patient has undergone excision of hidradenitis of the right and left axilla as well as the left groin.  She will undergo wound VAC changes.   Once a granulation bed has formed she will need skin grafts of the right and left axilla.  Left groin is likely close on its own    Continue with current wound VAC.    At this point the plans are for the patient to go to LTAC.  She will need to follow-up with me in approximately 2 weeks or  once a granulation bed has been accomplished.  She can have 3 times per week or twice a week wound changes further wound care providers at the LTAC.    If for any recent LTAC cannot be arranged the patient can follow-up with us in the office on Mondays and Thursdays for wound VAC changes.

## 2017-05-23 NOTE — PROGRESS NOTES
"[facility] Hospital Medicine  Progress Note    Patient Name: Josey Flores  MRN: [id number]  Patient Class: IP- Inpatient   Admission Date: 5/16/2017  Length of Stay: 7 days  Attending Physician: Aileen Barr MD  Primary Care Provider: Aileen Sadler MD      Subjective:     Principal Problem:Hidradenitis    HPI:  This is a 45 y/o CF with no PMH except +tobacco abuse until January of this year when she was diagnosed with Breast Cancer to the Right Breast.  Patient was scheduled for a mastectomy, but she was ultimately determined to be Stage IV with mets to the bone and lymph nodes, therefore surgery was cancelled.  Patient reports taking oral chemo daily and has been given 1.5-2 years.  She presented to Dr. Delong's office today secondary to c/o "infected and swollen lymph nodes under Right arm, and to left groin".  Dr. Delong recommended she come to the ER for admission and IV ABX. Dr. Wahl is at the bedside evaluating and plans to take her to surgery tomorrow AM.  She is currently receiving Vanc and Cefepime.  BC have been drawn.  She is afebrile. Admit to Hospital Medicine. Keep NPO after MN for anticipated surgery tomorrow      Hospital Course:  Pt underwent an I & D to bilateral axilla and left groin by Dr. Wahl 5/17/18. Areas have intact wound vac. She underwent wound vac change in OR on 5/19/17. Pt no longer on Vanc and Cefepime, presently receiving Zyvox.  She has pain only to right axilla with movement. Blood cultures NGTD. She will need to wound vac and will need skin grafts of the right and left axilla. Left groin will likely close on its own. Chemotherapy medications were held but later resumed 5/22/17.     Interval History: Awaiting LTAC placement. No acute events overnight.     Review of Systems   Constitutional: Negative for appetite change, chills, fatigue and fever.   HENT: Negative.  Negative for congestion, dental problem, ear pain, facial swelling, mouth " sores, nosebleeds, postnasal drip, sneezing and sore throat.    Eyes: Negative for pain, discharge, redness and itching.   Respiratory: Negative for cough, shortness of breath and wheezing.    Cardiovascular: Negative for chest pain and leg swelling.   Gastrointestinal: Negative for abdominal pain, diarrhea, nausea and vomiting.   Endocrine: Negative.    Genitourinary: Negative for dysuria and frequency.   Musculoskeletal: Positive for myalgias.   Skin: Positive for wound.   Allergic/Immunologic: Positive for immunocompromised state.   Neurological: Negative for dizziness, tremors, syncope, weakness, light-headedness and headaches.   Psychiatric/Behavioral: Negative for confusion, dysphoric mood and hallucinations. The patient is not hyperactive.      Objective:     Vital Signs (Most Recent):  Temp: 98.5 °F (36.9 °C) (05/23/17 1155)  Pulse: 71 (05/23/17 1155)  Resp: 18 (05/23/17 1155)  BP: (!) 99/51 (05/23/17 1155)  SpO2: 98 % (05/23/17 1155) Vital Signs (24h Range):  Temp:  [97.7 °F (36.5 °C)-99.1 °F (37.3 °C)] 98.5 °F (36.9 °C)  Pulse:  [71-75] 71  Resp:  [16-18] 18  SpO2:  [95 %-98 %] 98 %  BP: ()/(51-64) 99/51     Weight: 93.9 kg (207 lb)  Body mass index is 32.42 kg/m².    Intake/Output Summary (Last 24 hours) at 05/23/17 1403  Last data filed at 05/23/17 1223   Gross per 24 hour   Intake          3058.75 ml   Output                0 ml   Net          3058.75 ml      Physical Exam   Constitutional: No distress.   Overweight   HENT:   Head: Normocephalic and atraumatic.   Eyes: No scleral icterus.   Neck: No JVD present.   Cardiovascular: Normal rate and regular rhythm.    Pulmonary/Chest: Effort normal and breath sounds normal.   Abdominal: Soft. Bowel sounds are normal.   Skin: She is not diaphoretic.   There are wound vacs on the right and left axilla as well as left groin.  There is no surrounding erythema.   Vitals reviewed.      Significant Labs:   CBC:   Recent Labs  Lab 05/22/17  0455  05/23/17  0511   WBC 6.61 6.79   HGB 10.3* 11.5*   HCT 30.7* 34.3*    347     CMP:   Recent Labs  Lab 05/22/17  0453 05/23/17  0511    140   K 4.0 4.2    104   CO2 28 29    99   BUN 7 7   CREATININE 0.9 1.0   CALCIUM 8.2* 8.7   PROT 6.1  --    ALBUMIN 2.9*  --    BILITOT 0.2  --    ALKPHOS 82  --    *  --    *  --    ANIONGAP 5* 7*   EGFRNONAA >60 >60       Significant Imaging:  Imaging Results    None           Assessment/Plan:      * Hidradenitis    -Patient has undergone excision of hidradenitis of the right and left axilla as well as the left groin on 5/17/17. Wound VAC was changed in the operating room 5/19/17 . She will undergo wound VAC changes on Monday Wednesdays and Fridays. Once a granulation bed has formed she will need skin grafts of the right and left axilla. Left groin is likely close on its own   -Will begin bedside wound VAC changes on Today by the wound care nurse  -Continue IV Zyvoxx for now  -Awaiting LTAC arrangements.             Malignant neoplasm of overlapping sites of right female breast    - Follows with Dr. Delong. Per patient she has Stage IV metastatic breast cancer to the bone and lymph system  -Takes PO Letrozole and Ibrance for chemotherapy. Currently on hold.  Plans to restart on Monday. She has her own supply and will use her own supply of Ibrance.   -Hematology following        Secondary cancer of bone    - cont current tx          Malignant neoplasm metastatic to lymph node of axilla    -Plan as above          VTE Risk Mitigation         Ordered     enoxaparin injection 40 mg  Every 24 hours (non-standard times)     Route:  Subcutaneous        05/17/17 1643     Medium Risk of VTE  Once      05/17/17 1643     Place sequential compression device  Until discontinued      05/17/17 1643          Denia Westbrook NP  Department of Hospital Medicine   [facility]

## 2017-05-23 NOTE — SUBJECTIVE & OBJECTIVE
Interval History: Less pain, tolerating wound VAC changes    Medications:  Continuous Infusions:   lactated ringers 75 mL/hr at 05/21/17 2340     Scheduled Meds:   citalopram  20 mg Oral Daily    enoxparin  40 mg Subcutaneous Q24H    ergocalciferol  50,000 Units Oral Q7 Days    linezolid 600mg/300ml  600 mg Intravenous Q12H    multivitamin  1 tablet Oral Daily    palbociclib  125 mg Oral Daily    pantoprazole  40 mg Oral Daily     PRN Meds:albuterol sulfate, alprazolam, aluminum-magnesium hydroxide-simethicone, dextromethorphan-guaifenesin  mg/5 ml, dextrose 50%, dextrose 50%, diphenhydrAMINE, glucagon (human recombinant), glucose, glucose, HYDROmorphone, metoclopramide HCl, morphine, ondansetron, ondansetron, oxycodone-acetaminophen, oxycodone-acetaminophen, sodium chloride 0.9%, sodium chloride 0.9%     Review of patient's allergies indicates:   Allergen Reactions    Vancomycin analogues Itching     Objective:     Vital Signs (Most Recent):  Temp: 99.1 °F (37.3 °C) (05/23/17 0454)  Pulse: 73 (05/23/17 0454)  Resp: 16 (05/23/17 0454)  BP: 106/60 (05/23/17 0454)  SpO2: 95 % (05/23/17 0454) Vital Signs (24h Range):  Temp:  [97.7 °F (36.5 °C)-99.1 °F (37.3 °C)] 99.1 °F (37.3 °C)  Pulse:  [72-82] 73  Resp:  [16-20] 16  SpO2:  [95 %-98 %] 95 %  BP: (104-145)/(56-86) 106/60     Weight: 93.9 kg (207 lb)  Body mass index is 32.42 kg/m².    Intake/Output - Last 3 Shifts       05/21 0700 - 05/22 0659 05/22 0700 - 05/23 0659 05/23 0700 - 05/24 0659    P.O. 500 720 240    I.V. (mL/kg) 1653.8 (17.6) 1738.8 (18.5)     IV Piggyback 600 600     Total Intake(mL/kg) 2753.8 (29.3) 3058.8 (32.6) 240 (2.6)    Urine (mL/kg/hr) 0 (0)      Drains 0 (0)      Other 0 (0)      Stool 0 (0)      Total Output 0        Net +2753.8 +3058.8 +240           Urine Occurrence 3 x 8 x 1 x    Stool Occurrence 2 x 1 x           Physical Exam   Constitutional: No distress.   Overweight   HENT:   Head: Normocephalic and atraumatic.   Eyes:  No scleral icterus.   Neck: No JVD present.   Cardiovascular: Normal rate and regular rhythm.    Pulmonary/Chest: Effort normal and breath sounds normal.   Abdominal: Soft. Bowel sounds are normal.   Skin: She is diaphoretic.   There are wound vacs on the right and left axilla as well as left groin.  There is no surrounding erythema.   Vitals reviewed.      Significant Labs:  CBC:   Recent Labs  Lab 05/23/17  0511   WBC 6.79   RBC 3.65*   HGB 11.5*   HCT 34.3*      MCV 94   MCH 31.5*   MCHC 33.5     BMP:   Recent Labs  Lab 05/17/17  0559  05/23/17  0511     < > 99     < > 140   K 4.0  < > 4.2     < > 104   CO2 25  < > 29   BUN 9  < > 7   CREATININE 0.9  < > 1.0   CALCIUM 9.0  < > 8.7   MG 2.3  --   --    < > = values in this interval not displayed.  CMP:   Recent Labs  Lab 05/22/17  0453 05/23/17  0511    99   CALCIUM 8.2* 8.7   ALBUMIN 2.9*  --    PROT 6.1  --     140   K 4.0 4.2   CO2 28 29    104   BUN 7 7   CREATININE 0.9 1.0   ALKPHOS 82  --    *  --    *  --    BILITOT 0.2  --        Significant Diagnostics:  None

## 2017-05-23 NOTE — ASSESSMENT & PLAN NOTE
-Patient has undergone excision of hidradenitis of the right and left axilla as well as the left groin on 5/17/17. Wound VAC was changed in the operating room 5/19/17 . She will undergo wound VAC changes on Monday Wednesdays and Fridays. Once a granulation bed has formed she will need skin grafts of the right and left axilla. Left groin is likely close on its own   -Will begin bedside wound VAC changes on Today by the wound care nurse  -Continue IV Zyvoxx for now  -Awaiting LTAC arrangements.

## 2017-05-23 NOTE — PLAN OF CARE
Problem: Patient Care Overview  Goal: Plan of Care Review  Outcome: Ongoing (interventions implemented as appropriate)  Room air, respirations even and unlabored, no distress noted on assessment, pain, no nausea or shortness of breath, bilateral axillary wound vac left groin wound vac,  at bedside, scd's, refused bed alarm

## 2017-05-23 NOTE — ASSESSMENT & PLAN NOTE
Metastatic breast cancer: On Letrozole and Ibrance. She started on Ibrance in 2/2017 and has completed 4 cycles thus far. Restarted cycle 5 of letrozole and Ibrance on Monday 5/22/17.  She has her own supply and will use her own supply of Ibrance. No significant cytopenias with previous cycles. We will continue to monitor counts.       --restarted letrozole 2.5mg daily and Ibrance,125mg Cycle 5 5/22/17  --monitor counts 2-3 times/week

## 2017-05-23 NOTE — PLAN OF CARE
Received notification from [facility] , Rajni JOE who states that , Joel, reports that insurance is process of drafting a single-case agreement with [facility] in [place] authorizing LTAC placement for patient at this facility.  Joel stating that this single-case agreement can take up to 24-48 hours.    Maranda with [facility] notified of above.  NP for hospitalist notified and patient and fiance notified.

## 2017-05-23 NOTE — SUBJECTIVE & OBJECTIVE
Interval History: Awaiting LTAC placement. No acute events overnight.     Review of Systems   Constitutional: Negative for appetite change, chills, fatigue and fever.   HENT: Negative.  Negative for congestion, dental problem, ear pain, facial swelling, mouth sores, nosebleeds, postnasal drip, sneezing and sore throat.    Eyes: Negative for pain, discharge, redness and itching.   Respiratory: Negative for cough, shortness of breath and wheezing.    Cardiovascular: Negative for chest pain and leg swelling.   Gastrointestinal: Negative for abdominal pain, diarrhea, nausea and vomiting.   Endocrine: Negative.    Genitourinary: Negative for dysuria and frequency.   Musculoskeletal: Positive for myalgias.   Skin: Positive for wound.   Allergic/Immunologic: Positive for immunocompromised state.   Neurological: Negative for dizziness, tremors, syncope, weakness, light-headedness and headaches.   Psychiatric/Behavioral: Negative for confusion, dysphoric mood and hallucinations. The patient is not hyperactive.      Objective:     Vital Signs (Most Recent):  Temp: 98.5 °F (36.9 °C) (05/23/17 1155)  Pulse: 71 (05/23/17 1155)  Resp: 18 (05/23/17 1155)  BP: (!) 99/51 (05/23/17 1155)  SpO2: 98 % (05/23/17 1155) Vital Signs (24h Range):  Temp:  [97.7 °F (36.5 °C)-99.1 °F (37.3 °C)] 98.5 °F (36.9 °C)  Pulse:  [71-75] 71  Resp:  [16-18] 18  SpO2:  [95 %-98 %] 98 %  BP: ()/(51-64) 99/51     Weight: 93.9 kg (207 lb)  Body mass index is 32.42 kg/m².    Intake/Output Summary (Last 24 hours) at 05/23/17 1403  Last data filed at 05/23/17 1223   Gross per 24 hour   Intake          3058.75 ml   Output                0 ml   Net          3058.75 ml      Physical Exam   Constitutional: No distress.   Overweight   HENT:   Head: Normocephalic and atraumatic.   Eyes: No scleral icterus.   Neck: No JVD present.   Cardiovascular: Normal rate and regular rhythm.    Pulmonary/Chest: Effort normal and breath sounds normal.   Abdominal: Soft.  Bowel sounds are normal.   Skin: She is not diaphoretic.   There are wound vacs on the right and left axilla as well as left groin.  There is no surrounding erythema.   Vitals reviewed.      Significant Labs:   CBC:   Recent Labs  Lab 05/22/17 0453 05/23/17  0511   WBC 6.61 6.79   HGB 10.3* 11.5*   HCT 30.7* 34.3*    347     CMP:   Recent Labs  Lab 05/22/17 0453 05/23/17  0511    140   K 4.0 4.2    104   CO2 28 29    99   BUN 7 7   CREATININE 0.9 1.0   CALCIUM 8.2* 8.7   PROT 6.1  --    ALBUMIN 2.9*  --    BILITOT 0.2  --    ALKPHOS 82  --    *  --    *  --    ANIONGAP 5* 7*   EGFRNONAA >60 >60       Significant Imaging:  Imaging Results    None

## 2017-05-23 NOTE — ASSESSMENT & PLAN NOTE
Right axillary and left inguinal abscesses: s/p surgical excision of skin, surrounding subcutaneous tissue - currently with wound vac in place.  --Linezolid  --wound care

## 2017-05-23 NOTE — PROGRESS NOTES
[facility]  General Surgery  Progress Note    Subjective:     History of Present Illness:  No notes on file    Post-Op Info:  Procedure(s) (LRB):  EXCHANGE-WOUND VAC (Bilateral)   4 Days Post-Op     Interval History: Less pain, tolerating wound VAC changes    Medications:  Continuous Infusions:   lactated ringers 75 mL/hr at 05/21/17 2340     Scheduled Meds:   citalopram  20 mg Oral Daily    enoxparin  40 mg Subcutaneous Q24H    ergocalciferol  50,000 Units Oral Q7 Days    linezolid 600mg/300ml  600 mg Intravenous Q12H    multivitamin  1 tablet Oral Daily    palbociclib  125 mg Oral Daily    pantoprazole  40 mg Oral Daily     PRN Meds:albuterol sulfate, alprazolam, aluminum-magnesium hydroxide-simethicone, dextromethorphan-guaifenesin  mg/5 ml, dextrose 50%, dextrose 50%, diphenhydrAMINE, glucagon (human recombinant), glucose, glucose, HYDROmorphone, metoclopramide HCl, morphine, ondansetron, ondansetron, oxycodone-acetaminophen, oxycodone-acetaminophen, sodium chloride 0.9%, sodium chloride 0.9%     Review of patient's allergies indicates:   Allergen Reactions    Vancomycin analogues Itching     Objective:     Vital Signs (Most Recent):  Temp: 99.1 °F (37.3 °C) (05/23/17 0454)  Pulse: 73 (05/23/17 0454)  Resp: 16 (05/23/17 0454)  BP: 106/60 (05/23/17 0454)  SpO2: 95 % (05/23/17 0454) Vital Signs (24h Range):  Temp:  [97.7 °F (36.5 °C)-99.1 °F (37.3 °C)] 99.1 °F (37.3 °C)  Pulse:  [72-82] 73  Resp:  [16-20] 16  SpO2:  [95 %-98 %] 95 %  BP: (104-145)/(56-86) 106/60     Weight: 93.9 kg (207 lb)  Body mass index is 32.42 kg/m².    Intake/Output - Last 3 Shifts       05/21 0700 - 05/22 0659 05/22 0700 - 05/23 0659 05/23 0700 - 05/24 0659    P.O. 500 720 240    I.V. (mL/kg) 1653.8 (17.6) 1738.8 (18.5)     IV Piggyback 600 600     Total Intake(mL/kg) 2753.8 (29.3) 3058.8 (32.6) 240 (2.6)    Urine (mL/kg/hr) 0 (0)      Drains 0 (0)      Other 0 (0)      Stool 0 (0)      Total Output 0         Net +2753.8 +3058.8 +240           Urine Occurrence 3 x 8 x 1 x    Stool Occurrence 2 x 1 x           Physical Exam   Constitutional: No distress.   Overweight   HENT:   Head: Normocephalic and atraumatic.   Eyes: No scleral icterus.   Neck: No JVD present.   Cardiovascular: Normal rate and regular rhythm.    Pulmonary/Chest: Effort normal and breath sounds normal.   Abdominal: Soft. Bowel sounds are normal.   Skin: She is diaphoretic.   There are wound vacs on the right and left axilla as well as left groin.  There is no surrounding erythema.   Vitals reviewed.      Significant Labs:  CBC:   Recent Labs  Lab 05/23/17  0511   WBC 6.79   RBC 3.65*   HGB 11.5*   HCT 34.3*      MCV 94   MCH 31.5*   MCHC 33.5     BMP:   Recent Labs  Lab 05/17/17  0559  05/23/17  0511     < > 99     < > 140   K 4.0  < > 4.2     < > 104   CO2 25  < > 29   BUN 9  < > 7   CREATININE 0.9  < > 1.0   CALCIUM 9.0  < > 8.7   MG 2.3  --   --    < > = values in this interval not displayed.  CMP:   Recent Labs  Lab 05/22/17  0453 05/23/17  0511    99   CALCIUM 8.2* 8.7   ALBUMIN 2.9*  --    PROT 6.1  --     140   K 4.0 4.2   CO2 28 29    104   BUN 7 7   CREATININE 0.9 1.0   ALKPHOS 82  --    *  --    *  --    BILITOT 0.2  --        Significant Diagnostics:  None    Assessment/Plan:     Secondary cancer of bone    Management per oncology  Recommend restarting chemotherapy        Malignant neoplasm metastatic to lymph node of axilla    If any enlarged lymph nodes are encountered and debridement of the hidradenitis of the right axilla these can be excised at that time        Malignant neoplasm of overlapping sites of right female breast    Will need to restart Ibrance on Monday as infection should be clean and it will take several week for the wounds to hear or be ready for a skin graft        * Hidradenitis    Patient has undergone excision of hidradenitis of the right and left axilla as well  as the left groin.  She will undergo wound VAC changes.   Once a granulation bed has formed she will need skin grafts of the right and left axilla.  Left groin is likely close on its own    Continue with current wound VAC.    At this point the plans are for the patient to go to LTAC.  She will need to follow-up with me in approximately 2 weeks or  once a granulation bed has been accomplished.  She can have 3 times per week or twice a week wound changes further wound care providers at the LTAC.    If for any recent LTAC cannot be arranged the patient can follow-up with us in the office on Mondays and Thursdays for wound VAC changes.            Rodger Wahl MD  General Surgery  [facility]

## 2017-05-24 LAB
BASOPHILS # BLD AUTO: 0.09 K/UL
BASOPHILS NFR BLD: 1.4 %
DIFFERENTIAL METHOD: ABNORMAL
EOSINOPHIL # BLD AUTO: 0.1 K/UL
EOSINOPHIL NFR BLD: 1.7 %
ERYTHROCYTE [DISTWIDTH] IN BLOOD BY AUTOMATED COUNT: 15.4 %
HCT VFR BLD AUTO: 31.3 %
HGB BLD-MCNC: 10.7 G/DL
LYMPHOCYTES # BLD AUTO: 2.8 K/UL
LYMPHOCYTES NFR BLD: 43.7 %
MCH RBC QN AUTO: 35.3 PG
MCHC RBC AUTO-ENTMCNC: 34.2 %
MCV RBC AUTO: 103 FL
MONOCYTES # BLD AUTO: 0.8 K/UL
MONOCYTES NFR BLD: 12 %
NEUTROPHILS # BLD AUTO: 2.7 K/UL
NEUTROPHILS NFR BLD: 41.2 %
PLATELET # BLD AUTO: 293 K/UL
PMV BLD AUTO: 9.8 FL
RBC # BLD AUTO: 3.03 M/UL
WBC # BLD AUTO: 6.43 K/UL

## 2017-05-24 PROCEDURE — 25000003 PHARM REV CODE 250: Performed by: SURGERY

## 2017-05-24 PROCEDURE — 25000003 PHARM REV CODE 250: Performed by: EMERGENCY MEDICINE

## 2017-05-24 PROCEDURE — 25000003 PHARM REV CODE 250: Performed by: NURSE PRACTITIONER

## 2017-05-24 PROCEDURE — 63600175 PHARM REV CODE 636 W HCPCS: Performed by: INTERNAL MEDICINE

## 2017-05-24 PROCEDURE — 85025 COMPLETE CBC W/AUTO DIFF WBC: CPT

## 2017-05-24 PROCEDURE — 25000003 PHARM REV CODE 250: Performed by: INTERNAL MEDICINE

## 2017-05-24 PROCEDURE — 97608 NEG PRS WND THER NDME>50SQCM: CPT

## 2017-05-24 PROCEDURE — 63600175 PHARM REV CODE 636 W HCPCS: Performed by: SURGERY

## 2017-05-24 PROCEDURE — 21400001 HC TELEMETRY ROOM

## 2017-05-24 PROCEDURE — 36415 COLL VENOUS BLD VENIPUNCTURE: CPT

## 2017-05-24 PROCEDURE — 99232 SBSQ HOSP IP/OBS MODERATE 35: CPT | Mod: ,,, | Performed by: INTERNAL MEDICINE

## 2017-05-24 RX ADMIN — HYDROMORPHONE HYDROCHLORIDE 2 MG: 2 INJECTION, SOLUTION INTRAMUSCULAR; INTRAVENOUS; SUBCUTANEOUS at 11:05

## 2017-05-24 RX ADMIN — OXYCODONE HYDROCHLORIDE AND ACETAMINOPHEN 1 TABLET: 10; 325 TABLET ORAL at 08:05

## 2017-05-24 RX ADMIN — ENOXAPARIN SODIUM 40 MG: 100 INJECTION SUBCUTANEOUS at 12:05

## 2017-05-24 RX ADMIN — LINEZOLID 600 MG: 600 INJECTION, SOLUTION INTRAVENOUS at 02:05

## 2017-05-24 RX ADMIN — OXYCODONE HYDROCHLORIDE AND ACETAMINOPHEN 1 TABLET: 10; 325 TABLET ORAL at 03:05

## 2017-05-24 RX ADMIN — CITALOPRAM HYDROBROMIDE 20 MG: 20 TABLET ORAL at 08:05

## 2017-05-24 RX ADMIN — SODIUM CHLORIDE, SODIUM LACTATE, POTASSIUM CHLORIDE, AND CALCIUM CHLORIDE: .6; .31; .03; .02 INJECTION, SOLUTION INTRAVENOUS at 08:05

## 2017-05-24 RX ADMIN — PANTOPRAZOLE SODIUM 40 MG: 40 TABLET, DELAYED RELEASE ORAL at 08:05

## 2017-05-24 RX ADMIN — SODIUM CHLORIDE, SODIUM LACTATE, POTASSIUM CHLORIDE, AND CALCIUM CHLORIDE: .6; .31; .03; .02 INJECTION, SOLUTION INTRAVENOUS at 04:05

## 2017-05-24 RX ADMIN — OXYCODONE HYDROCHLORIDE AND ACETAMINOPHEN 1 TABLET: 10; 325 TABLET ORAL at 04:05

## 2017-05-24 RX ADMIN — THERA TABS 1 TABLET: TAB at 08:05

## 2017-05-24 RX ADMIN — MORPHINE SULFATE 4 MG: 4 INJECTION, SOLUTION INTRAMUSCULAR; INTRAVENOUS at 12:05

## 2017-05-24 NOTE — ASSESSMENT & PLAN NOTE
-Patient has undergone excision of hidradenitis of the right and left axilla as well as the left groin on 5/17/17. Wound VAC was changed in the operating room 5/19/17 . She will undergo wound VAC changes on Monday Wednesdays and Fridays. Once a granulation bed has formed she will need skin grafts of the right and left axilla. Left groin is likely close on its own   -Will begin bedside wound VAC changes on Today by the wound care nurse  -Will stop Zyvox  -Awaiting LTAC arrangements.

## 2017-05-24 NOTE — SUBJECTIVE & OBJECTIVE
Interval History:   No events overnight    Oncology Treatment Plan:   [No treatment plan]    Medications:  Continuous Infusions:   lactated ringers 75 mL/hr at 05/24/17 0430     Scheduled Meds:   citalopram  20 mg Oral Daily    enoxparin  40 mg Subcutaneous Q24H    ergocalciferol  50,000 Units Oral Q7 Days    linezolid 600mg/300ml  600 mg Intravenous Q12H    multivitamin  1 tablet Oral Daily    palbociclib  125 mg Oral Daily    pantoprazole  40 mg Oral Daily     PRN Meds:albuterol sulfate, alprazolam, aluminum-magnesium hydroxide-simethicone, dextromethorphan-guaifenesin  mg/5 ml, dextrose 50%, dextrose 50%, diphenhydrAMINE, glucagon (human recombinant), glucose, glucose, HYDROmorphone, metoclopramide HCl, morphine, ondansetron, ondansetron, oxycodone-acetaminophen, oxycodone-acetaminophen, sodium chloride 0.9%, sodium chloride 0.9%     Review of Systems   Constitutional: Negative for appetite change, chills, fatigue and fever.   HENT: Negative.  Negative for congestion, dental problem, ear pain, facial swelling, mouth sores, nosebleeds, postnasal drip, sneezing and sore throat.    Eyes: Negative.    Respiratory: Negative for cough, shortness of breath and wheezing.    Cardiovascular: Negative for chest pain and leg swelling.   Gastrointestinal: Negative for abdominal pain, anal bleeding, constipation, diarrhea, nausea and vomiting.   Endocrine: Negative.    Genitourinary: Negative for dysuria and frequency.   Musculoskeletal: Positive for myalgias.   Skin: Positive for wound.   Allergic/Immunologic: Positive for immunocompromised state.   Neurological: Negative for dizziness, tremors, syncope, weakness, light-headedness and headaches.   Psychiatric/Behavioral: Negative for confusion, dysphoric mood and hallucinations. The patient is not hyperactive.      Objective:     Vital Signs (Most Recent):  Temp: 98.8 °F (37.1 °C) (05/24/17 0400)  Pulse: 66 (05/24/17 0400)  Resp: 18 (05/24/17 0400)  BP: 102/70  (05/24/17 0400)  SpO2: 95 % (05/24/17 0400) Vital Signs (24h Range):  Temp:  [97.9 °F (36.6 °C)-98.8 °F (37.1 °C)] 98.8 °F (37.1 °C)  Pulse:  [65-79] 66  Resp:  [16-18] 18  SpO2:  [94 %-98 %] 95 %  BP: ()/(51-70) 102/70     Weight: 93.9 kg (207 lb)  Body mass index is 32.42 kg/m².  Body surface area is 2.11 meters squared.      Intake/Output Summary (Last 24 hours) at 05/24/17 0641  Last data filed at 05/24/17 0505   Gross per 24 hour   Intake          3576.25 ml   Output                0 ml   Net          3576.25 ml       Physical Exam   Constitutional: No distress.   HENT:   Head: Normocephalic.   Left Ear: External ear normal.   Nose: Nose normal.   Mouth/Throat: Oropharynx is clear and moist and mucous membranes are normal. Mucous membranes are not pale. No oropharyngeal exudate.   Eyes: Pupils are equal, round, and reactive to light. Right eye exhibits no discharge. Left eye exhibits no discharge.   Neck: Normal range of motion. Neck supple.   Cardiovascular: Normal rate, regular rhythm and normal heart sounds.  Exam reveals no gallop and no friction rub.    No murmur heard.  Pulmonary/Chest: Effort normal. No respiratory distress. She has no wheezes. She has no rales.   Abdominal: Soft. Bowel sounds are normal. She exhibits no distension. There is no tenderness.   Musculoskeletal: She exhibits no edema, tenderness or deformity.   Skin:   There is a wound VAC to the right and left axilla with no leakage  There is a wound VAC to the left groin with no leakage   Psychiatric: She has a normal mood and affect. Thought content normal.   Vitals reviewed.      Significant Labs:   CBC:   Recent Labs  Lab 05/23/17  0511 05/24/17  0543   WBC 6.79 6.43   HGB 11.5* 10.7*   HCT 34.3* 31.3*    293   , CMP:   Recent Labs  Lab 05/23/17  0511      K 4.2      CO2 29   GLU 99   BUN 7   CREATININE 1.0   CALCIUM 8.7   ANIONGAP 7*   EGFRNONAA >60   , Immunology: No results for input(s): HUA HOMERO, KARINA,  FREELAMBDALI in the last 48 hours., LDH: No results for input(s): LDHCSF, BFSOURCE in the last 48 hours. and LFTs: No results for input(s): ALT, AST, ALKPHOS, BILITOT, PROT, ALBUMIN in the last 48 hours.    Diagnostic Results:  I have reviewed all pertinent imaging results/findings within the past 24 hours.

## 2017-05-24 NOTE — PLAN OF CARE
Problem: Patient Care Overview  Goal: Plan of Care Review  Outcome: Ongoing (interventions implemented as appropriate)  Wound care POC reviewed with patient and family including vac dressing changes tid and troubleshooting alarms on machine;

## 2017-05-24 NOTE — PROGRESS NOTES
"[facility] Hospital Medicine  Progress Note    Patient Name: Josey Flores  MRN: [id number]  Patient Class: IP- Inpatient   Admission Date: 5/16/2017  Length of Stay: 8 days  Attending Physician: Aileen Barr MD  Primary Care Provider: Aileen Sadler MD        Subjective:     Principal Problem:Hidradenitis    HPI:  This is a 47 y/o CF with no PMH except +tobacco abuse until January of this year when she was diagnosed with Breast Cancer to the Right Breast.  Patient was scheduled for a mastectomy, but she was ultimately determined to be Stage IV with mets to the bone and lymph nodes, therefore surgery was cancelled.  Patient reports taking oral chemo daily and has been given 1.5-2 years.  She presented to Dr. Delong's office today secondary to c/o "infected and swollen lymph nodes under Right arm, and to left groin".  Dr. Delong recommended she come to the ER for admission and IV ABX. Dr. Wahl is at the bedside evaluating and plans to take her to surgery tomorrow AM.  She is currently receiving Vanc and Cefepime.  BC have been drawn.  She is afebrile. Admit to Hospital Medicine. Keep NPO after MN for anticipated surgery tomorrow      Hospital Course:  Pt underwent an I & D to bilateral axilla and left groin by Dr. Wahl 5/17/18. Areas have intact wound vac. She underwent wound vac change in OR on 5/19/17. Pt no longer on Vanc and Cefepime, presently receiving Zyvox.  She has pain only to right axilla with movement. Blood cultures NGTD. She will need to wound vac and will need skin grafts of the right and left axilla. Left groin will likely close on its own. Chemotherapy medications were held but later resumed 5/22/17.     Interval History: Awaiting LTAC placement. No complaints.    Review of Systems   Constitutional: Negative for appetite change, chills, fatigue and fever.   HENT: Negative.  Negative for congestion, dental problem, ear pain, facial swelling, mouth sores, " nosebleeds, postnasal drip, sneezing and sore throat.    Eyes: Negative for pain, discharge, redness and itching.   Respiratory: Negative for cough, shortness of breath and wheezing.    Cardiovascular: Negative for chest pain and leg swelling.   Gastrointestinal: Negative for abdominal pain, diarrhea, nausea and vomiting.   Endocrine: Negative.    Genitourinary: Negative for dysuria and frequency.   Musculoskeletal: Positive for myalgias.   Skin: Positive for wound.   Allergic/Immunologic: Positive for immunocompromised state.   Neurological: Negative for dizziness, tremors, syncope, weakness, light-headedness and headaches.   Psychiatric/Behavioral: Negative for confusion, dysphoric mood and hallucinations. The patient is not hyperactive.      Objective:     Vital Signs (Most Recent):  Temp: 98.2 °F (36.8 °C) (05/24/17 1237)  Pulse: 64 (05/24/17 1237)  Resp: 18 (05/24/17 1237)  BP: (!) 105/51 (05/24/17 1237)  SpO2: 98 % (05/24/17 1237) Vital Signs (24h Range):  Temp:  [97.7 °F (36.5 °C)-98.8 °F (37.1 °C)] 98.2 °F (36.8 °C)  Pulse:  [64-79] 64  Resp:  [16-18] 18  SpO2:  [95 %-98 %] 98 %  BP: (102-119)/(51-70) 105/51     Weight: 93.9 kg (207 lb)  Body mass index is 32.42 kg/m².    Intake/Output Summary (Last 24 hours) at 05/24/17 1637  Last data filed at 05/24/17 1200   Gross per 24 hour   Intake          3036.25 ml   Output                0 ml   Net          3036.25 ml      Physical Exam   Constitutional: No distress.   Overweight   HENT:   Head: Normocephalic and atraumatic.   Eyes: No scleral icterus.   Neck: No JVD present.   Cardiovascular: Normal rate and regular rhythm.    Pulmonary/Chest: Effort normal and breath sounds normal.   Abdominal: Soft. Bowel sounds are normal.   Skin: She is not diaphoretic.   There are wound vacs on the right and left axilla as well as left groin.  There is no surrounding erythema.   Vitals reviewed.      Significant Labs:   CBC:   Recent Labs  Lab 05/23/17  0511 05/24/17  0543    WBC 6.79 6.43   HGB 11.5* 10.7*   HCT 34.3* 31.3*    293     CMP:   Recent Labs  Lab 05/23/17  0511      K 4.2      CO2 29   GLU 99   BUN 7   CREATININE 1.0   CALCIUM 8.7   ANIONGAP 7*   EGFRNONAA >60       Significant Imaging: Reviewed    Assessment/Plan:      * Hidradenitis    -Patient has undergone excision of hidradenitis of the right and left axilla as well as the left groin on 5/17/17. Wound VAC was changed in the operating room 5/19/17 . She will undergo wound VAC changes on Monday Wednesdays and Fridays. Once a granulation bed has formed she will need skin grafts of the right and left axilla. Left groin is likely close on its own   -Will begin bedside wound VAC changes on Today by the wound care nurse  -Will stop Zyvox  -Awaiting LTAC arrangements.             Malignant neoplasm of overlapping sites of right female breast    - Follows with Dr. Delong. Per patient she has Stage IV metastatic breast cancer to the bone and lymph system  -Takes PO Letrozole and Ibrance for chemotherapy. Currently on hold.  Plans to restart on Monday. She has her own supply and will use her own supply of Ibrance.   -Hematology following        Secondary cancer of bone    - cont current tx          Malignant neoplasm metastatic to lymph node of axilla    -Plan as above          VTE Risk Mitigation         Ordered     enoxaparin injection 40 mg  Every 24 hours (non-standard times)     Route:  Subcutaneous        05/17/17 1643     Medium Risk of VTE  Once      05/17/17 1643     Place sequential compression device  Until discontinued      05/17/17 1643          Denia Westbrook NP  Department of Hospital Medicine   [facility]

## 2017-05-24 NOTE — ASSESSMENT & PLAN NOTE
Patient has undergone excision of hidradenitis of the right and left axilla as well as the left groin.  She will undergo wound VAC changes.   Once a granulation bed has formed she will need skin grafts of the right and left axilla.  Left groin is likely close on its own    Continue with current wound VAC.    At this point the plans are for the patient to go to LTAC.  She will need to follow-up with me in approximately 2 weeks or  once a granulation bed has been accomplished.  She can have 3 times per week or twice a week wound changes further wound care providers at the LTAC.    If LTAC does not work out she and follow up on Monday and Thursday for vac changes    If for any recent LTAC cannot be arranged the patient can follow-up with us in the office on Mondays and Thursdays for wound VAC changes.

## 2017-05-24 NOTE — ASSESSMENT & PLAN NOTE
Right axillary and left inguinal abscesses: s/p surgical excision of skin, surrounding subcutaneous tissue - currently with wound vac in place.  Plans are for the patient to go to LTAC.  She will need to follow-up with Dr Wahl in approximately 2 weeks or  once a granulation bed has been accomplished.    She needs to have labs twice a week at LTAC to ensure adequate counts while on Ibrance.   --Linezolid  --Further wound care at LTAC

## 2017-05-24 NOTE — ASSESSMENT & PLAN NOTE
Metastatic breast cancer: On Letrozole and Ibrance. She started on Ibrance in 2/2017 and has completed 4 cycles thus far. Restarted cycle 5 of letrozole and Ibrance on Monday 5/22/17. No significant cytopenias with previous cycles. We will continue to monitor counts.       --restarted letrozole 2.5mg daily and Ibrance,125mg Cycle 5 started 5/22/17  --Ibrance is dosed day 1 - 21 every 28 days; letrozole is daily  --monitor counts 2 times per week after LTAC placement

## 2017-05-24 NOTE — PLAN OF CARE
Problem: Patient Care Overview  Goal: Plan of Care Review  Outcome: Ongoing (interventions implemented as appropriate)  Fall prevention precautions maintained, pt remained free of falls throughout shift, call bell and personal items within reach, pt's pain adequately controlled by prn pain medication, wound vac remains in place with no issues. 24 hour chart check completed. Will continue to monitor

## 2017-05-24 NOTE — PLAN OF CARE
Problem: Patient Care Overview  Goal: Plan of Care Review  Outcome: Ongoing (interventions implemented as appropriate)  Pt remain free from fall and injuries this shift fall precautions remain in place. Pain managed by prn pain med as ordered. poc reviewed pt verbalize understanding. No distress noted. Spouse at bedside. 12 hr chart check complete.

## 2017-05-24 NOTE — PROGRESS NOTES
[facility]  General Surgery  Progress Note    Subjective:     History of Present Illness:  No notes on file    Post-Op Info:  Procedure(s) (LRB):  EXCHANGE-WOUND VAC (Bilateral)   5 Days Post-Op     Interval History: less pain    Medications:  Continuous Infusions:   lactated ringers 75 mL/hr at 05/24/17 0430     Scheduled Meds:   citalopram  20 mg Oral Daily    enoxparin  40 mg Subcutaneous Q24H    ergocalciferol  50,000 Units Oral Q7 Days    linezolid 600mg/300ml  600 mg Intravenous Q12H    multivitamin  1 tablet Oral Daily    palbociclib  125 mg Oral Daily    pantoprazole  40 mg Oral Daily     PRN Meds:albuterol sulfate, alprazolam, aluminum-magnesium hydroxide-simethicone, dextromethorphan-guaifenesin  mg/5 ml, dextrose 50%, dextrose 50%, diphenhydrAMINE, glucagon (human recombinant), glucose, glucose, HYDROmorphone, metoclopramide HCl, morphine, ondansetron, ondansetron, oxycodone-acetaminophen, oxycodone-acetaminophen, sodium chloride 0.9%, sodium chloride 0.9%     Review of patient's allergies indicates:   Allergen Reactions    Vancomycin analogues Itching     Objective:     Vital Signs (Most Recent):  Temp: 97.7 °F (36.5 °C) (05/24/17 0730)  Pulse: 72 (05/24/17 0730)  Resp: 18 (05/24/17 0730)  BP: (!) 109/57 (05/24/17 0730)  SpO2: 95 % (05/24/17 0730) Vital Signs (24h Range):  Temp:  [97.7 °F (36.5 °C)-98.8 °F (37.1 °C)] 97.7 °F (36.5 °C)  Pulse:  [66-79] 72  Resp:  [16-18] 18  SpO2:  [95 %-98 %] 95 %  BP: ()/(51-70) 109/57     Weight: 93.9 kg (207 lb)  Body mass index is 32.42 kg/m².    Intake/Output - Last 3 Shifts       05/22 0700 - 05/23 0659 05/23 0700 - 05/24 0659 05/24 0700 - 05/25 0659    P.O. 720 1320     I.V. (mL/kg) 1738.8 (18.5) 1656.3 (17.6)     IV Piggyback 600 600     Total Intake(mL/kg) 3058.8 (32.6) 3576.3 (38.1)     Urine (mL/kg/hr)  0 (0)     Drains  0 (0)     Other       Stool       Total Output   0      Net +3058.8 +3576.3             Urine  Occurrence 8 x 10 x     Stool Occurrence 1 x 0 x 0 x          Physical Exam   Constitutional: She appears well-developed and well-nourished. No distress.   Cardiovascular: Regular rhythm.    Pulmonary/Chest: Effort normal and breath sounds normal.   Abdominal: Soft. Bowel sounds are normal.   Skin:   Wound vac to axillas and left groin, no leak   Vitals reviewed.      Significant Labs:  CBC:   Recent Labs  Lab 05/24/17  0543   WBC 6.43   RBC 3.03*   HGB 10.7*   HCT 31.3*      *   MCH 35.3*   MCHC 34.2     BMP:   Recent Labs  Lab 05/23/17  0511   GLU 99      K 4.2      CO2 29   BUN 7   CREATININE 1.0   CALCIUM 8.7       Significant Diagnostics:  none    Assessment/Plan:     Secondary cancer of bone    Management per oncology  Recommend restarting chemotherapy        Malignant neoplasm metastatic to lymph node of axilla    If any enlarged lymph nodes are encountered and debridement of the hidradenitis of the right axilla these can be excised at that time        Malignant neoplasm of overlapping sites of right female breast    Will need to restart Ibrance on Monday as infection should be clean and it will take several week for the wounds to hear or be ready for a skin graft        * Hidradenitis    Patient has undergone excision of hidradenitis of the right and left axilla as well as the left groin.  She will undergo wound VAC changes.   Once a granulation bed has formed she will need skin grafts of the right and left axilla.  Left groin is likely close on its own    Continue with current wound VAC.    At this point the plans are for the patient to go to LTAC.  She will need to follow-up with me in approximately 2 weeks or  once a granulation bed has been accomplished.  She can have 3 times per week or twice a week wound changes further wound care providers at the LTAC.    If LTAC does not work out she and follow up on Monday and Thursday for vac changes    If for any recent LTAC cannot be  arranged the patient can follow-up with us in the office on Mondays and Thursdays for wound VAC changes.            Rodger Wahl MD  General Surgery  [facility]

## 2017-05-24 NOTE — SUBJECTIVE & OBJECTIVE
Interval History: Awaiting LTAC placement. No complaints.    Review of Systems   Constitutional: Negative for appetite change, chills, fatigue and fever.   HENT: Negative.  Negative for congestion, dental problem, ear pain, facial swelling, mouth sores, nosebleeds, postnasal drip, sneezing and sore throat.    Eyes: Negative for pain, discharge, redness and itching.   Respiratory: Negative for cough, shortness of breath and wheezing.    Cardiovascular: Negative for chest pain and leg swelling.   Gastrointestinal: Negative for abdominal pain, diarrhea, nausea and vomiting.   Endocrine: Negative.    Genitourinary: Negative for dysuria and frequency.   Musculoskeletal: Positive for myalgias.   Skin: Positive for wound.   Allergic/Immunologic: Positive for immunocompromised state.   Neurological: Negative for dizziness, tremors, syncope, weakness, light-headedness and headaches.   Psychiatric/Behavioral: Negative for confusion, dysphoric mood and hallucinations. The patient is not hyperactive.      Objective:     Vital Signs (Most Recent):  Temp: 98.2 °F (36.8 °C) (05/24/17 1237)  Pulse: 64 (05/24/17 1237)  Resp: 18 (05/24/17 1237)  BP: (!) 105/51 (05/24/17 1237)  SpO2: 98 % (05/24/17 1237) Vital Signs (24h Range):  Temp:  [97.7 °F (36.5 °C)-98.8 °F (37.1 °C)] 98.2 °F (36.8 °C)  Pulse:  [64-79] 64  Resp:  [16-18] 18  SpO2:  [95 %-98 %] 98 %  BP: (102-119)/(51-70) 105/51     Weight: 93.9 kg (207 lb)  Body mass index is 32.42 kg/m².    Intake/Output Summary (Last 24 hours) at 05/24/17 1637  Last data filed at 05/24/17 1200   Gross per 24 hour   Intake          3036.25 ml   Output                0 ml   Net          3036.25 ml      Physical Exam   Constitutional: No distress.   Overweight   HENT:   Head: Normocephalic and atraumatic.   Eyes: No scleral icterus.   Neck: No JVD present.   Cardiovascular: Normal rate and regular rhythm.    Pulmonary/Chest: Effort normal and breath sounds normal.   Abdominal: Soft. Bowel sounds  are normal.   Skin: She is not diaphoretic.   There are wound vacs on the right and left axilla as well as left groin.  There is no surrounding erythema.   Vitals reviewed.      Significant Labs:   CBC:   Recent Labs  Lab 05/23/17  0511 05/24/17  0543   WBC 6.79 6.43   HGB 11.5* 10.7*   HCT 34.3* 31.3*    293     CMP:   Recent Labs  Lab 05/23/17  0511      K 4.2      CO2 29   GLU 99   BUN 7   CREATININE 1.0   CALCIUM 8.7   ANIONGAP 7*   EGFRNONAA >60       Significant Imaging: Reviewed

## 2017-05-24 NOTE — PROGRESS NOTES
[facility] -   Hematology/Oncology  Progress Note    Patient Name: Josey Flores  Admission Date: 5/16/2017  Hospital Length of Stay: 8 days  Code Status: Full Code     Subjective:     HPI:  Josey Flores is a 46 y.o. female with metastatic breast cancer on chemotherapy admitted for management of multiple abscesses related to hidradenitis. She has reportedly had recurrent problems with hidradenitis for several years. She has been managed with outpatient antibiotics in the past. However, she was evaluated on 5/16 in [facility] clinic by Dr. Delong with complaint of worsening pain in her right axilla and left groin. She was referred for admission for surgical I&D of these abscesses. She was started on IV Cefepime and Linezolid. She was evaluated by surgery and underwent excision of skin, subcutaneous tissue and lymph nodes in the R axilla and L groin with wound vac placement today. She states she currently has a great deal of pain in the R axilla, but is doing well otherwise. No fevers.       Interval History:   No events overnight    Oncology Treatment Plan:   [No treatment plan]    Medications:  Continuous Infusions:   lactated ringers 75 mL/hr at 05/24/17 0430     Scheduled Meds:   citalopram  20 mg Oral Daily    enoxparin  40 mg Subcutaneous Q24H    ergocalciferol  50,000 Units Oral Q7 Days    linezolid 600mg/300ml  600 mg Intravenous Q12H    multivitamin  1 tablet Oral Daily    palbociclib  125 mg Oral Daily    pantoprazole  40 mg Oral Daily     PRN Meds:albuterol sulfate, alprazolam, aluminum-magnesium hydroxide-simethicone, dextromethorphan-guaifenesin  mg/5 ml, dextrose 50%, dextrose 50%, diphenhydrAMINE, glucagon (human recombinant), glucose, glucose, HYDROmorphone, metoclopramide HCl, morphine, ondansetron, ondansetron, oxycodone-acetaminophen, oxycodone-acetaminophen, sodium chloride 0.9%, sodium chloride 0.9%     Review of Systems   Constitutional: Negative for appetite  change, chills, fatigue and fever.   HENT: Negative.  Negative for congestion, dental problem, ear pain, facial swelling, mouth sores, nosebleeds, postnasal drip, sneezing and sore throat.    Eyes: Negative.    Respiratory: Negative for cough, shortness of breath and wheezing.    Cardiovascular: Negative for chest pain and leg swelling.   Gastrointestinal: Negative for abdominal pain, anal bleeding, constipation, diarrhea, nausea and vomiting.   Endocrine: Negative.    Genitourinary: Negative for dysuria and frequency.   Musculoskeletal: Positive for myalgias.   Skin: Positive for wound.   Allergic/Immunologic: Positive for immunocompromised state.   Neurological: Negative for dizziness, tremors, syncope, weakness, light-headedness and headaches.   Psychiatric/Behavioral: Negative for confusion, dysphoric mood and hallucinations. The patient is not hyperactive.      Objective:     Vital Signs (Most Recent):  Temp: 98.8 °F (37.1 °C) (05/24/17 0400)  Pulse: 66 (05/24/17 0400)  Resp: 18 (05/24/17 0400)  BP: 102/70 (05/24/17 0400)  SpO2: 95 % (05/24/17 0400) Vital Signs (24h Range):  Temp:  [97.9 °F (36.6 °C)-98.8 °F (37.1 °C)] 98.8 °F (37.1 °C)  Pulse:  [65-79] 66  Resp:  [16-18] 18  SpO2:  [94 %-98 %] 95 %  BP: ()/(51-70) 102/70     Weight: 93.9 kg (207 lb)  Body mass index is 32.42 kg/m².  Body surface area is 2.11 meters squared.      Intake/Output Summary (Last 24 hours) at 05/24/17 0641  Last data filed at 05/24/17 0505   Gross per 24 hour   Intake          3576.25 ml   Output                0 ml   Net          3576.25 ml       Physical Exam   Constitutional: No distress.   HENT:   Head: Normocephalic.   Left Ear: External ear normal.   Nose: Nose normal.   Mouth/Throat: Oropharynx is clear and moist and mucous membranes are normal. Mucous membranes are not pale. No oropharyngeal exudate.   Eyes: Pupils are equal, round, and reactive to light. Right eye exhibits no discharge. Left eye exhibits no discharge.    Neck: Normal range of motion. Neck supple.   Cardiovascular: Normal rate, regular rhythm and normal heart sounds.  Exam reveals no gallop and no friction rub.    No murmur heard.  Pulmonary/Chest: Effort normal. No respiratory distress. She has no wheezes. She has no rales.   Abdominal: Soft. Bowel sounds are normal. She exhibits no distension. There is no tenderness.   Musculoskeletal: She exhibits no edema, tenderness or deformity.   Skin:   There is a wound VAC to the right and left axilla with no leakage  There is a wound VAC to the left groin with no leakage   Psychiatric: She has a normal mood and affect. Thought content normal.   Vitals reviewed.      Significant Labs:   CBC:   Recent Labs  Lab 05/23/17  0511 05/24/17  0543   WBC 6.79 6.43   HGB 11.5* 10.7*   HCT 34.3* 31.3*    293   , CMP:   Recent Labs  Lab 05/23/17  0511      K 4.2      CO2 29   GLU 99   BUN 7   CREATININE 1.0   CALCIUM 8.7   ANIONGAP 7*   EGFRNONAA >60   , Immunology: No results for input(s): SPEP, HOMERO, KARINA, FREELAMBDALI in the last 48 hours., LDH: No results for input(s): LDHCSF, BFSOURCE in the last 48 hours. and LFTs: No results for input(s): ALT, AST, ALKPHOS, BILITOT, PROT, ALBUMIN in the last 48 hours.    Diagnostic Results:  I have reviewed all pertinent imaging results/findings within the past 24 hours.    Assessment/Plan:     Malignant neoplasm of overlapping sites of right female breast    Metastatic breast cancer: On Letrozole and Ibrance. She started on Ibrance in 2/2017 and has completed 4 cycles thus far. Restarted cycle 5 of letrozole and Ibrance on Monday 5/22/17. No significant cytopenias with previous cycles. We will continue to monitor counts.       --restarted letrozole 2.5mg daily and Ibrance,125mg Cycle 5 started 5/22/17  --Ibrance is dosed day 1 - 21 every 28 days; letrozole is daily  --monitor counts 2 times per week after LTAC placement        * Hidradenitis    Right axillary and left inguinal  abscesses: s/p surgical excision of skin, surrounding subcutaneous tissue - currently with wound vac in place.  Plans are for the patient to go to LTAC.  She will need to follow-up with Dr Wahl in approximately 2 weeks or  once a granulation bed has been accomplished.    She needs to have labs twice a week at LTAC to ensure adequate counts while on Ibrance.   --Linezolid  --Further wound care at LTAC            Thank you for your consult. I will follow-up with patient. Please contact us if you have any additional questions.     Arya Keith Jr, MD  Hematology/Oncology  [facility]

## 2017-05-24 NOTE — PLAN OF CARE
Maranda with [facility] here at [facility] and obtained updated patient information.  Maranda stating that they are ready to accept patient for LTAC placement at their facility, PENDING single-case agreement.  Maranda indicates that once [facility] has received insurance single-case agreement, they will notify this  so that D/C Orders can be written.    Regarding wound vac, Maranda is requesting that at time of discharge from [facility], wound vac be disconnected/clamped and [facility] will place their wound vac on patient once she arrives there.  [facility] wound care nurse, Viktoriya LANG,  stating that this can be done.      Regarding [employer] wound vac in patient's hospital room, will contact [facility] and request that it is picked up (for return to [facility]) once single-case agreement has been received by [facility].  [facility] Med-Surg Nurse also made aware of all the above as well.         D/C PLAN:  LTAC placement at [facility] in [place], with patient to be transported upon discharge via her fiance via private vehicle

## 2017-05-24 NOTE — PLAN OF CARE
Problem: Patient Care Overview  Goal: Plan of Care Review  Outcome: Ongoing (interventions implemented as appropriate)  Patient remained free from injury.  at bedside. Ambulated in corral x1. Stated she was tire of being in the room. Wound vac dressing change done today 5/24/17. Tolerated well. Call light in reach. Bed locked and low. Report given to Adalberto

## 2017-05-24 NOTE — PROGRESS NOTES
05/24/17 1252   WOCN Assessment   WOCN Total Time (mins) 70   Visit Date 05/24/17   Visit Time 1105   Consult Type Follow Up   WOCN Speciality Wound   WOCN List wound vac   Wound surgical   Number of Wounds 3   Procedure wound vac   Intervention assessed;changed;team conference   Teaching on-going;complication;discharge     Follow up visit for wound vac dressing change.  I spoke with DAYANNA Jorgensen  regarding discharge plans on this patient this morning.  She states we are waiting on final authorizations, but plan is LTAC in [place].  I called and spoke with [facility] in [facility], and was informed that they also use [employer] wound vacs, and patient can be clamped and disconnect from our vac machine at time of discharge without removing dressing.  Also they request wound vac dressing change to be performed by myself today, as they are unsure if or when her transfer might occur.  HEATH Watkins RN CWON present with myself at time of dressing change.  Patient premedicated for pain by THOMAS Mcrae RN prior to our starting, and patient also ambulated to bathroom independently. Sister and significant other present during wound care. Questions answered regarding care for home wound vac in case LTAC is not approved.   Prior to starting dressing change and premedication with IV Dilaudid, patient states pain 6/10.    Right Axilla drape removed using adhesive remover pads, black foam saturated with sterile normal saline, and removed.  Wound bed pink and moist with some granulation tissue noted.  Irrigated wound bed with sterile normal saline.  Intact bandar wound skin painted with cavilon skin barrier.  Previously blistered areas improved.  1 piece Black Granufoam cut to size and placed in wound bed, and secured with drape.  SensaTRAC pad and tubing applied to this bridge.    Left Axilla drape removed using adhesive remover pads, 2 pieces black foam saturated with sterile normal saline, and removed.  Wound bed pink and moist with some granulation tissue noted and scant oozing. Irrigated wound bed with sterile normal saline. Intact bandar wound skin painted with cavilon skin barrier. Previously blistered areas improved. 1 piece black granufoam cut to size and placed in wound bed, and secured with drape. SensaTRAC pad and tubing applied.  Left groin drape removed using adhesive remover pads.  Black foam saturated with sterile normal saline, and removed.  Wond bed pink and moist with some granulation tissue noted.  Irrigated wound bed with sterile normal saline. Intact bandar wound skin painted with cavilon skin barrier. Previously blistered areas improved.  1 piece black granufoam cut to size and placed in wound bed and secured with drape. SensaTRAC pad and tubing applied.    All 3 wounds connected via Y-connectors x2 to VAC ulta machine and machine turned on to -125 mmHg low intensity suction.  Good seal noted with no air leak.   Patient tolerated wound care well after premedication, with moderate pain but recovered easily.  Will benefit from IV pain medication prior to dressing changes in LTAC at discharge.     Reportable conditions for Patients utilizing Negative Pressure Wound Vac Therapy :  1.Loss of seal, raised foam, or wound drainage suddenly decreasing in amount or stopping   2.Bright red blood or rapid filling of the canister  3.Periwound erythema, heat, edema, pain, elevated temperature and white blood cell count, cloudy or foul-smelling wound drainage, excess bleeding,  macerated periwound skin  4.Wound drainage becoming foul smelling and cloudy  5. Inability to trouble shoot alarm for greater than two hours    Primary Nurse Assessment should include the following  1. Document system patency  2. Amount and description of wound fluid  3. Pain level  4. Tolerance of NPWT

## 2017-05-24 NOTE — PLAN OF CARE
Joel at [facility] contacted by [facility] , Rajni JOE, regarding status of single-case agreement.  Joel stating that single-case agreement is being worked on and should be complete tomorrow.

## 2017-05-25 VITALS
SYSTOLIC BLOOD PRESSURE: 102 MMHG | DIASTOLIC BLOOD PRESSURE: 46 MMHG | RESPIRATION RATE: 18 BRPM | TEMPERATURE: 98 F | WEIGHT: 207 LBS | HEIGHT: 67 IN | HEART RATE: 69 BPM | BODY MASS INDEX: 32.49 KG/M2 | OXYGEN SATURATION: 95 %

## 2017-05-25 PROCEDURE — 25000003 PHARM REV CODE 250: Performed by: INTERNAL MEDICINE

## 2017-05-25 PROCEDURE — 25000003 PHARM REV CODE 250: Performed by: EMERGENCY MEDICINE

## 2017-05-25 PROCEDURE — 25000003 PHARM REV CODE 250: Performed by: NURSE PRACTITIONER

## 2017-05-25 PROCEDURE — 99232 SBSQ HOSP IP/OBS MODERATE 35: CPT | Mod: ,,, | Performed by: INTERNAL MEDICINE

## 2017-05-25 RX ADMIN — OXYCODONE HYDROCHLORIDE AND ACETAMINOPHEN 1 TABLET: 10; 325 TABLET ORAL at 10:05

## 2017-05-25 RX ADMIN — CITALOPRAM HYDROBROMIDE 20 MG: 20 TABLET ORAL at 09:05

## 2017-05-25 RX ADMIN — OXYCODONE HYDROCHLORIDE AND ACETAMINOPHEN 1 TABLET: 5; 325 TABLET ORAL at 01:05

## 2017-05-25 RX ADMIN — OXYCODONE HYDROCHLORIDE AND ACETAMINOPHEN 1 TABLET: 10; 325 TABLET ORAL at 12:05

## 2017-05-25 RX ADMIN — THERA TABS 1 TABLET: TAB at 09:05

## 2017-05-25 RX ADMIN — PANTOPRAZOLE SODIUM 40 MG: 40 TABLET, DELAYED RELEASE ORAL at 09:05

## 2017-05-25 NOTE — PLAN OF CARE
Received call from Maranda with [facility] in [place] that Letter of Agreement has been signed and they are ready to accept patient.  D/C Order obtained.  D/C Documentation faxed to [facility] in [place] via [facility] [employer] to complete referral.  [facility] bedside nurse provided with [facility] phone number ([phone]) to call report and made aware that current (hospital) wound vac is to be clamped and wound vac in box in hospital room is NOT to be placed on patient as this wound vac will be picked up by [employer] tomorrow.  Informed [facility] bedside nurse that wound vac that will be at [facility] will be placed on patient when she arrives there.  Contacted [facility] Med-Surg Charge Nurse, Rhonda, and requested that she remove [employer] wound vac out of patient's hospital room and place it in 5th floor case management office for [facility] to  tomorrow.  Patient aware of above.    D/C PLAN:  LTAC placement at [facility] in [place], with patient's fiance to transport patient upon discharge today       05/25/17 1520   Final Note   Assessment Type Final Discharge Note   Discharge Disposition LTAC   What phone number can be called within the next 1-3 days to see how you are doing after discharge? [phone]   Hospital Follow Up  Appt(s) scheduled? Yes   Discharge plans and expectations educations in teach back method with documentation complete? Yes   Discharge/Hospital Encounter Summary to ([facility]) PCP n/a   Referral to Outpatient Case Management complete? n/a   Referral to / orders for Home Health Complete? n/a   Any social issues identified prior to discharge? No   Did you assess the readiness or willingness of the family or caregiver to support self management of care? Yes   [facility] Referral Info   [facility] Recommendation Other   Referral Type LTAC   Facility Name [facility] in [place]   [address] [address] [place] [address]

## 2017-05-25 NOTE — PROGRESS NOTES
[facility]  General Surgery  Progress Note    Subjective:     History of Present Illness:  No notes on file    Post-Op Info:  Procedure(s) (LRB):  EXCHANGE-WOUND VAC (Bilateral)   6 Days Post-Op     Interval History:   Less pain with wound vac changes    Medications:  Continuous Infusions:   lactated ringers 75 mL/hr at 05/24/17 2039     Scheduled Meds:   citalopram  20 mg Oral Daily    enoxparin  40 mg Subcutaneous Q24H    ergocalciferol  50,000 Units Oral Q7 Days    multivitamin  1 tablet Oral Daily    palbociclib  125 mg Oral Daily    pantoprazole  40 mg Oral Daily     PRN Meds:albuterol sulfate, alprazolam, aluminum-magnesium hydroxide-simethicone, dextromethorphan-guaifenesin  mg/5 ml, dextrose 50%, dextrose 50%, diphenhydrAMINE, glucagon (human recombinant), glucose, glucose, HYDROmorphone, metoclopramide HCl, morphine, ondansetron, ondansetron, oxycodone-acetaminophen, oxycodone-acetaminophen, sodium chloride 0.9%, sodium chloride 0.9%     Review of patient's allergies indicates:   Allergen Reactions    Vancomycin analogues Itching     Objective:     Vital Signs (Most Recent):  Temp: 98.1 °F (36.7 °C) (05/25/17 0000)  Pulse: 66 (05/25/17 0000)  Resp: 18 (05/25/17 0000)  BP: (!) 102/50 (05/25/17 0000)  SpO2: (!) 94 % (05/25/17 0000) Vital Signs (24h Range):  Temp:  [97.6 °F (36.4 °C)-98.2 °F (36.8 °C)] 98.1 °F (36.7 °C)  Pulse:  [63-72] 66  Resp:  [18-20] 18  SpO2:  [94 %-98 %] 94 %  BP: (102-107)/(50-65) 102/50     Weight: 93.9 kg (207 lb)  Body mass index is 32.42 kg/m².    Intake/Output - Last 3 Shifts       05/23 0700 - 05/24 0659 05/24 0700 - 05/25 0659 05/25 0700 - 05/26 0659    P.O. 1320 360     I.V. (mL/kg) 1656.3 (17.6) 1793.8 (19.1)     IV Piggyback 600      Total Intake(mL/kg) 3576.3 (38.1) 2153.8 (22.9)     Urine (mL/kg/hr) 0 (0) 0 (0)     Drains 0 (0)      Other  150 (0.1)     Stool  0 (0)     Total Output 0 150      Net +3576.3 +2003.8             Urine Occurrence  10 x 4 x     Stool Occurrence 0 x 1 x           Physical Exam   Constitutional: She appears well-developed and well-nourished. No distress.   Cardiovascular: Normal rate, regular rhythm and normal heart sounds.    Pulmonary/Chest: Effort normal.   Abdominal: Soft. Bowel sounds are normal.   Skin:   Wound vac with good function to axillas ans left groin   Vitals reviewed.      Significant Labs:  CBC:   Recent Labs  Lab 05/24/17  0543   WBC 6.43   RBC 3.03*   HGB 10.7*   HCT 31.3*      *   MCH 35.3*   MCHC 34.2     BMP:   Recent Labs  Lab 05/23/17  0511   GLU 99      K 4.2      CO2 29   BUN 7   CREATININE 1.0   CALCIUM 8.7       Significant Diagnostics:  none    Assessment/Plan:     Secondary cancer of bone    Management per oncology  Recommend restarting chemotherapy        Malignant neoplasm metastatic to lymph node of axilla    If any enlarged lymph nodes are encountered and debridement of the hidradenitis of the right axilla these can be excised at that time        Malignant neoplasm of overlapping sites of right female breast    Will need to restart Ibrance on Monday as infection should be clean and it will take several week for the wounds to hear or be ready for a skin graft        * Hidradenitis    Patient has undergone excision of hidradenitis of the right and left axilla as well as the left groin.  She will undergo wound VAC changes.   Once a granulation bed has formed she will need skin grafts of the right and left axilla.  Left groin is likely close on its own    Continue with current wound VAC.    At this point the plans are for the patient to go to LTAC.  She will need to follow-up with me in approximately 2 weeks or  once a granulation bed has been accomplished.  She can have 3 times per week or twice a week wound changes further wound care providers at the LTAC.    If LTAC does not work out she and follow up on Monday and Thursday for vac changes    If for any recent LTAC then  she could be discharged to day with follow up on Monday for a wound vac change or the wound vac can be changed tomorrow and she can then be  dicharged after with follow up on Monday.            Rodger Wahl MD  General Surgery  [facility]

## 2017-05-25 NOTE — PLAN OF CARE
Problem: Patient Care Overview  Goal: Plan of Care Review  Outcome: Outcome(s) achieved Date Met: 05/25/17  Reviewed and discussed plan of care with patient.  Patient verbalized understanding.  Safety and fall precautions reviewed and discussed with patient.  Pain managed adequately.  Goals achieved for discharge.

## 2017-05-25 NOTE — PLAN OF CARE
Received call from Maranda with [facility] early this morning stating that she was notified by insurance that a Letter of Agreement (SHABNAM) is being sent to them today.  Maranda indicating that she will notify this  once SHABNAM has been signed so that discharge orders can be written by [facility] NP.     Due to not having heard back from Maranda, [facility] Rajni JOE, contacted Ray with insurance who states that insurance and [facility] are currently in process of negotiating per katey rate.     Also, spoke with Nora at [facility] and notified her of uncertainty of patient's discharge plan (possible LTAC vs. Outpatient wound care at [facility] surgeon's office).  Requested that [employer] wound vac remain here in patient's hospital room at [facility] until definite discharge plan is determined.  Nora verbalized understanding and requests that this  contact her as soon as it is determined whether [employer] wound vac will be needed by patient.

## 2017-05-25 NOTE — ASSESSMENT & PLAN NOTE
Patient has undergone excision of hidradenitis of the right and left axilla as well as the left groin.  She will undergo wound VAC changes.   Once a granulation bed has formed she will need skin grafts of the right and left axilla.  Left groin is likely close on its own    Continue with current wound VAC.    At this point the plans are for the patient to go to LTAC.  She will need to follow-up with me in approximately 2 weeks or  once a granulation bed has been accomplished.  She can have 3 times per week or twice a week wound changes further wound care providers at the LTAC.    If LTAC does not work out she and follow up on Monday and Thursday for vac changes    If for any recent LTAC then she could be discharged to day with follow up on Monday for a wound vac change or the wound vac can be changed tomorrow and she can then be  dicharged after with follow up on Monday.

## 2017-05-25 NOTE — NURSING
Verbalized understanding of discharge paperwork, follow up care, etc.  Denies having questions.  Encouraged to call when ready to wheel down.

## 2017-05-25 NOTE — SUBJECTIVE & OBJECTIVE
Interval History:   No co's today.  Pain well controlled.     Oncology Treatment Plan:   [No treatment plan]    Medications:  Continuous Infusions:   lactated ringers 75 mL/hr at 05/24/17 2039     Scheduled Meds:   citalopram  20 mg Oral Daily    enoxparin  40 mg Subcutaneous Q24H    ergocalciferol  50,000 Units Oral Q7 Days    multivitamin  1 tablet Oral Daily    palbociclib  125 mg Oral Daily    pantoprazole  40 mg Oral Daily     PRN Meds:albuterol sulfate, alprazolam, aluminum-magnesium hydroxide-simethicone, dextromethorphan-guaifenesin  mg/5 ml, dextrose 50%, dextrose 50%, diphenhydrAMINE, glucagon (human recombinant), glucose, glucose, HYDROmorphone, metoclopramide HCl, morphine, ondansetron, ondansetron, oxycodone-acetaminophen, oxycodone-acetaminophen, sodium chloride 0.9%, sodium chloride 0.9%     Review of Systems   Constitutional: Negative for activity change, appetite change, chills, fatigue and fever.   HENT: Negative.  Negative for congestion, dental problem, ear pain, facial swelling, postnasal drip, sneezing and sore throat.    Eyes: Negative.    Respiratory: Negative for apnea, cough, choking, chest tightness, shortness of breath, wheezing and stridor.    Cardiovascular: Negative for chest pain and leg swelling.   Gastrointestinal: Negative for constipation, nausea and vomiting.   Endocrine: Negative.    Genitourinary: Negative for dysuria, frequency and pelvic pain.   Musculoskeletal: Positive for myalgias. Negative for arthralgias, back pain, gait problem, neck pain and neck stiffness.   Skin: Positive for wound. Negative for color change, pallor and rash.   Allergic/Immunologic: Positive for immunocompromised state.   Neurological: Negative for dizziness, tremors, seizures, syncope, weakness, light-headedness, numbness and headaches.   Psychiatric/Behavioral: Negative for confusion, dysphoric mood, hallucinations and self-injury. The patient is not nervous/anxious and is not  hyperactive.      Objective:     Vital Signs (Most Recent):  Temp: 98.1 °F (36.7 °C) (05/25/17 0000)  Pulse: 66 (05/25/17 0000)  Resp: 18 (05/25/17 0000)  BP: (!) 102/50 (05/25/17 0000)  SpO2: (!) 94 % (05/25/17 0000) Vital Signs (24h Range):  Temp:  [97.6 °F (36.4 °C)-98.2 °F (36.8 °C)] 98.1 °F (36.7 °C)  Pulse:  [63-72] 66  Resp:  [18-20] 18  SpO2:  [94 %-98 %] 94 %  BP: (102-109)/(50-65) 102/50     Weight: 93.9 kg (207 lb)  Body mass index is 32.42 kg/m².  Body surface area is 2.11 meters squared.      Intake/Output Summary (Last 24 hours) at 05/25/17 0727  Last data filed at 05/25/17 0500   Gross per 24 hour   Intake          2153.75 ml   Output              150 ml   Net          2003.75 ml       Physical Exam   Constitutional: She is oriented to person, place, and time. No distress.   HENT:   Head: Normocephalic.   Left Ear: External ear normal.   Nose: Nose normal.   Mouth/Throat: Oropharynx is clear and moist and mucous membranes are normal. Mucous membranes are not pale. No oropharyngeal exudate.   Eyes: EOM are normal. Pupils are equal, round, and reactive to light.   Neck: Normal range of motion. Neck supple.   Cardiovascular: Normal rate, regular rhythm and normal heart sounds.  Exam reveals no gallop and no friction rub.    No murmur heard.  Pulmonary/Chest: Effort normal. No respiratory distress. She has no wheezes. She has no rales.   Abdominal: Soft. Bowel sounds are normal. She exhibits no distension and no mass. There is no tenderness. There is no rebound and no guarding. No hernia.   Musculoskeletal: She exhibits no edema, tenderness or deformity.   Neurological: She is alert and oriented to person, place, and time. She displays normal reflexes. No cranial nerve deficit. Coordination normal.   Skin:   There is a wound VAC to the right and left axilla  There is a wound VAC to the left groin   Psychiatric: She has a normal mood and affect. Thought content normal.   Vitals reviewed.      Significant  Labs:   CBC:   Recent Labs  Lab 05/24/17  0543   WBC 6.43   HGB 10.7*   HCT 31.3*      , CMP: No results for input(s): NA, K, CL, CO2, GLU, BUN, CREATININE, CALCIUM, PROT, ALBUMIN, BILITOT, ALKPHOS, AST, ALT, ANIONGAP, EGFRNONAA in the last 48 hours.    Invalid input(s): ESTGFAFRICA, LDH: No results for input(s): LDHCSF, BFSOURCE in the last 48 hours., LFTs: No results for input(s): ALT, AST, ALKPHOS, BILITOT, PROT, ALBUMIN in the last 48 hours. and Reticulocytes: No results for input(s): RETIC in the last 48 hours.    Diagnostic Results:  I have reviewed all pertinent imaging results/findings within the past 24 hours.

## 2017-05-25 NOTE — SUBJECTIVE & OBJECTIVE
Interval History:   Less pain with wound vac changes    Medications:  Continuous Infusions:   lactated ringers 75 mL/hr at 05/24/17 2039     Scheduled Meds:   citalopram  20 mg Oral Daily    enoxparin  40 mg Subcutaneous Q24H    ergocalciferol  50,000 Units Oral Q7 Days    multivitamin  1 tablet Oral Daily    palbociclib  125 mg Oral Daily    pantoprazole  40 mg Oral Daily     PRN Meds:albuterol sulfate, alprazolam, aluminum-magnesium hydroxide-simethicone, dextromethorphan-guaifenesin  mg/5 ml, dextrose 50%, dextrose 50%, diphenhydrAMINE, glucagon (human recombinant), glucose, glucose, HYDROmorphone, metoclopramide HCl, morphine, ondansetron, ondansetron, oxycodone-acetaminophen, oxycodone-acetaminophen, sodium chloride 0.9%, sodium chloride 0.9%     Review of patient's allergies indicates:   Allergen Reactions    Vancomycin analogues Itching     Objective:     Vital Signs (Most Recent):  Temp: 98.1 °F (36.7 °C) (05/25/17 0000)  Pulse: 66 (05/25/17 0000)  Resp: 18 (05/25/17 0000)  BP: (!) 102/50 (05/25/17 0000)  SpO2: (!) 94 % (05/25/17 0000) Vital Signs (24h Range):  Temp:  [97.6 °F (36.4 °C)-98.2 °F (36.8 °C)] 98.1 °F (36.7 °C)  Pulse:  [63-72] 66  Resp:  [18-20] 18  SpO2:  [94 %-98 %] 94 %  BP: (102-107)/(50-65) 102/50     Weight: 93.9 kg (207 lb)  Body mass index is 32.42 kg/m².    Intake/Output - Last 3 Shifts       05/23 0700 - 05/24 0659 05/24 0700 - 05/25 0659 05/25 0700 - 05/26 0659    P.O. 1320 360     I.V. (mL/kg) 1656.3 (17.6) 1793.8 (19.1)     IV Piggyback 600      Total Intake(mL/kg) 3576.3 (38.1) 2153.8 (22.9)     Urine (mL/kg/hr) 0 (0) 0 (0)     Drains 0 (0)      Other  150 (0.1)     Stool  0 (0)     Total Output 0 150      Net +3576.3 +2003.8             Urine Occurrence 10 x 4 x     Stool Occurrence 0 x 1 x           Physical Exam   Constitutional: She appears well-developed and well-nourished. No distress.   Cardiovascular: Normal rate, regular rhythm and normal heart sounds.     Pulmonary/Chest: Effort normal.   Abdominal: Soft. Bowel sounds are normal.   Skin:   Wound vac with good function to axillas ans left groin   Vitals reviewed.      Significant Labs:  CBC:   Recent Labs  Lab 05/24/17  0543   WBC 6.43   RBC 3.03*   HGB 10.7*   HCT 31.3*      *   MCH 35.3*   MCHC 34.2     BMP:   Recent Labs  Lab 05/23/17  0511   GLU 99      K 4.2      CO2 29   BUN 7   CREATININE 1.0   CALCIUM 8.7       Significant Diagnostics:  none

## 2017-05-25 NOTE — PROGRESS NOTES
[facility] -   Hematology/Oncology  Progress Note    Patient Name: Josey Flores  Admission Date: 5/16/2017  Hospital Length of Stay: 9 days  Code Status: Full Code     Subjective:     HPI:  Josey Flores is a 46 y.o. female with metastatic breast cancer on chemotherapy admitted for management of multiple abscesses related to hidradenitis. She has reportedly had recurrent problems with hidradenitis for several years. She has been managed with outpatient antibiotics in the past. However, she was evaluated on 5/16 in [facility] clinic by Dr. Delong with complaint of worsening pain in her right axilla and left groin. She was referred for admission for surgical I&D of these abscesses. She was started on IV Cefepime and Linezolid. She was evaluated by surgery and underwent excision of skin, subcutaneous tissue and lymph nodes in the R axilla and L groin with wound vac placement today. She states she currently has a great deal of pain in the R axilla, but is doing well otherwise. No fevers.       Interval History:   No co's today.  Pain well controlled.     Oncology Treatment Plan:   [No treatment plan]    Medications:  Continuous Infusions:   lactated ringers 75 mL/hr at 05/24/17 2039     Scheduled Meds:   citalopram  20 mg Oral Daily    enoxparin  40 mg Subcutaneous Q24H    ergocalciferol  50,000 Units Oral Q7 Days    multivitamin  1 tablet Oral Daily    palbociclib  125 mg Oral Daily    pantoprazole  40 mg Oral Daily     PRN Meds:albuterol sulfate, alprazolam, aluminum-magnesium hydroxide-simethicone, dextromethorphan-guaifenesin  mg/5 ml, dextrose 50%, dextrose 50%, diphenhydrAMINE, glucagon (human recombinant), glucose, glucose, HYDROmorphone, metoclopramide HCl, morphine, ondansetron, ondansetron, oxycodone-acetaminophen, oxycodone-acetaminophen, sodium chloride 0.9%, sodium chloride 0.9%     Review of Systems   Constitutional: Negative for activity change, appetite change, chills,  fatigue and fever.   HENT: Negative.  Negative for congestion, dental problem, ear pain, facial swelling, postnasal drip, sneezing and sore throat.    Eyes: Negative.    Respiratory: Negative for apnea, cough, choking, chest tightness, shortness of breath, wheezing and stridor.    Cardiovascular: Negative for chest pain and leg swelling.   Gastrointestinal: Negative for constipation, nausea and vomiting.   Endocrine: Negative.    Genitourinary: Negative for dysuria, frequency and pelvic pain.   Musculoskeletal: Positive for myalgias. Negative for arthralgias, back pain, gait problem, neck pain and neck stiffness.   Skin: Positive for wound. Negative for color change, pallor and rash.   Allergic/Immunologic: Positive for immunocompromised state.   Neurological: Negative for dizziness, tremors, seizures, syncope, weakness, light-headedness, numbness and headaches.   Psychiatric/Behavioral: Negative for confusion, dysphoric mood, hallucinations and self-injury. The patient is not nervous/anxious and is not hyperactive.      Objective:     Vital Signs (Most Recent):  Temp: 98.1 °F (36.7 °C) (05/25/17 0000)  Pulse: 66 (05/25/17 0000)  Resp: 18 (05/25/17 0000)  BP: (!) 102/50 (05/25/17 0000)  SpO2: (!) 94 % (05/25/17 0000) Vital Signs (24h Range):  Temp:  [97.6 °F (36.4 °C)-98.2 °F (36.8 °C)] 98.1 °F (36.7 °C)  Pulse:  [63-72] 66  Resp:  [18-20] 18  SpO2:  [94 %-98 %] 94 %  BP: (102-109)/(50-65) 102/50     Weight: 93.9 kg (207 lb)  Body mass index is 32.42 kg/m².  Body surface area is 2.11 meters squared.      Intake/Output Summary (Last 24 hours) at 05/25/17 0727  Last data filed at 05/25/17 0500   Gross per 24 hour   Intake          2153.75 ml   Output              150 ml   Net          2003.75 ml       Physical Exam   Constitutional: She is oriented to person, place, and time. No distress.   HENT:   Head: Normocephalic.   Left Ear: External ear normal.   Nose: Nose normal.   Mouth/Throat: Oropharynx is clear and moist  and mucous membranes are normal. Mucous membranes are not pale. No oropharyngeal exudate.   Eyes: EOM are normal. Pupils are equal, round, and reactive to light.   Neck: Normal range of motion. Neck supple.   Cardiovascular: Normal rate, regular rhythm and normal heart sounds.  Exam reveals no gallop and no friction rub.    No murmur heard.  Pulmonary/Chest: Effort normal. No respiratory distress. She has no wheezes. She has no rales.   Abdominal: Soft. Bowel sounds are normal. She exhibits no distension and no mass. There is no tenderness. There is no rebound and no guarding. No hernia.   Musculoskeletal: She exhibits no edema, tenderness or deformity.   Neurological: She is alert and oriented to person, place, and time. She displays normal reflexes. No cranial nerve deficit. Coordination normal.   Skin:   There is a wound VAC to the right and left axilla  There is a wound VAC to the left groin   Psychiatric: She has a normal mood and affect. Thought content normal.   Vitals reviewed.      Significant Labs:   CBC:   Recent Labs  Lab 05/24/17  0543   WBC 6.43   HGB 10.7*   HCT 31.3*      , CMP: No results for input(s): NA, K, CL, CO2, GLU, BUN, CREATININE, CALCIUM, PROT, ALBUMIN, BILITOT, ALKPHOS, AST, ALT, ANIONGAP, EGFRNONAA in the last 48 hours.    Invalid input(s): ESTGFAFRICA, LDH: No results for input(s): LDHCSF, BFSOURCE in the last 48 hours., LFTs: No results for input(s): ALT, AST, ALKPHOS, BILITOT, PROT, ALBUMIN in the last 48 hours. and Reticulocytes: No results for input(s): RETIC in the last 48 hours.    Diagnostic Results:  I have reviewed all pertinent imaging results/findings within the past 24 hours.    Assessment/Plan:     Malignant neoplasm of overlapping sites of right female breast    Metastatic breast cancer: On Letrozole and Ibrance. She started on Ibrance in 2/2017 and has completed 4 cycles thus far. Restarted cycle 5 of letrozole and Ibrance on Monday 5/22/17. No significant  cytopenias with previous cycles. We will continue to monitor counts with cbc 1-2 times per week.        --restarted letrozole 2.5mg daily and Ibrance,125mg Cycle 5 started 5/22/17  --Ibrance is dosed day 1 - 21 every 28 days; letrozole is daily  --monitor counts 2 times per week after LTAC placement        * Hidradenitis    Right axillary and left inguinal abscesses: s/p surgical excision of skin, surrounding subcutaneous tissue - currently with wound vac in place.  Plans are for the patient to go to LTAC.  She will need to follow-up with Dr Wahl in approximately 2 weeks or  once a granulation bed has been accomplished.    She needs to have labs twice a week at LTAC to ensure adequate counts while on Ibrance.   --Linezolid  --Further wound care at LTAC            Thank you for your consult. I will follow-up with patient. Please contact us if you have any additional questions.     Arya Keith Jr, MD  Hematology/Oncology  [facility]

## 2017-05-25 NOTE — DISCHARGE SUMMARY
"[facility] Hospital Medicine  Discharge Summary      Patient Name: Josey Flores  MRN: [id number]  Admission Date: 5/16/2017  Hospital Length of Stay: 9 days  Discharge Date and Time:  05/25/2017 3:42 PM  Attending Physician: Aileen Barr MD   Discharging Provider: Denia Westbrook NP  Primary Care Provider: Aileen Sadler MD      HPI:   This is a 45 y/o CF with no PMH except +tobacco abuse until January of this year when she was diagnosed with Breast Cancer to the Right Breast.  Patient was scheduled for a mastectomy, but she was ultimately determined to be Stage IV with mets to the bone and lymph nodes, therefore surgery was cancelled.  Patient reports taking oral chemo daily and has been given 1.5-2 years.  She presented to Dr. Delong's office today secondary to c/o "infected and swollen lymph nodes under Right arm, and to left groin".  Dr. Delong recommended she come to the ER for admission and IV ABX. Dr. Wahl is at the bedside evaluating and plans to take her to surgery tomorrow AM.  She is currently receiving Vanc and Cefepime.  BC have been drawn.  She is afebrile. Admit to Hospital Medicine. Keep NPO after MN for anticipated surgery tomorrow      Procedure(s) (LRB):  EXCHANGE-WOUND VAC (Bilateral)      Indwelling Lines/Drains at time of discharge:   Lines/Drains/Airways     Drain                 Closed/Suction Drain 05/19/17 1400 Left  Other (Comment) 6 days         Closed/Suction Drain 05/19/17 1400 Left Groin Other (Comment) 6 days         Closed/Suction Drain 05/19/17 1400 Right Other (Comment) Other (Comment) 6 days              Hospital Course:   Pt underwent an I & D to bilateral axilla and left groin by Dr. Wahl 5/17/18. Areas have intact wound vac. She underwent wound vac change in OR on 5/19/17. Pt no longer on Vanc and Cefepime, presently receiving Zyvox.  She has pain only to right axilla with movement. Blood cultures NGTD. She will need to wound vac " and will need skin grafts of the right and left axilla. Left groin will likely close on its own. She will continue wound care [facility] in [place]. She will follow up with Dr. Seo in 2 weeks and this appt has been arranged. Chemotherapy medications were held but later resumed 5/22/17. Patient seen and examined on date of discharge and deemed suitable.      Consults:   Consults         Status Ordering Provider     Inpatient consult to General surgery  Once     Provider:  Kristy Wahl MD    Completed ANA MAHAJAN JR     Inpatient consult to Hematology/Oncology  Once     Provider:  Anjana Pompa MD    Completed ANA MAHAJAN JR     Inpatient consult to Social Work/Case Management  Once     Provider:  (Not yet assigned)    KRISTY Ma          Significant Diagnostic Studies: Labs:   CMP No results for input(s): NA, K, CL, CO2, GLU, BUN, CREATININE, CALCIUM, PROT, ALBUMIN, BILITOT, ALKPHOS, AST, ALT, ANIONGAP, ESTGFRAFRICA, EGFRNONAA in the last 48 hours. 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  Service: Hospital Medicine      Discharged  Condition: stable    Disposition: Long Term Acute Care    Follow Up:  Future Appointments  Date Time Provider Department Center   5/30/2017 2:45 PM [facility]   5/30/2017 3:00 PM [facility]   5/30/2017 3:20 PM Marcio Rashid MD [facility]   6/12/2017 11:20 AM Rodger Wahl MD [facility]   7/19/2017 2:30 PM Erika Chan MD [facility]BG[facility]     \    Follow-up Information     Aileen Sadler MD In 3 days.    Specialty:  Family Medicine  Contact information:  [address]  [place] [address]             Rodger Wahl MD. Go on 6/12/2017.    Specialty:  General Surgery  Why:  Your appointment with Dr. Wahl has been scheduled for Monday, 06/12/17, at 11:20 am  Contact information:  [address]  [place] [address] [phone]                 Patient Instructions:     Diet general       Medications:  Reconciled Home Medications:   Current Discharge Medication List      CONTINUE these medications which have NOT CHANGED    Details   alprazolam (XANAX) 0.5 MG tablet Take 1 tablet (0.5 mg total) by mouth 2 (two) times daily as needed for Insomnia or Anxiety.  Qty: 60 tablet, Refills: 0    Associated Diagnoses: Malignant neoplasm of overlapping sites of right female breast; Anxiety; Malignant neoplasm metastatic to lymph node of axilla; Secondary cancer of bone; Neoplasm related pain; Depression, unspecified depression type      citalopram (CELEXA) 20 MG tablet Take 1 tablet (20 mg total) by mouth once daily.  Qty: 30 tablet, Refills: 2    Associated Diagnoses: Depression, unspecified depression type; Malignant neoplasm of overlapping sites of right female breast; Malignant neoplasm metastatic to lymph node of axilla; Secondary cancer of bone; Anxiety; Neoplasm related pain      ergocalciferol (VITAMIN D2) 50,000 unit Cap Take 50,000 Units by mouth every 7 days.      hydrocodone-acetaminophen 7.5-325mg (NORCO)  7.5-325 mg per tablet Take 1 tablet by mouth every 6 (six) hours as needed for Pain.  Qty: 60 tablet, Refills: 0    Associated Diagnoses: Malignant neoplasm of overlapping sites of right female breast; Malignant neoplasm metastatic to lymph node of axilla; Secondary cancer of bone      IBRANCE 125 mg Cap TAKE 1 CAPSULE (125 MG) DAILY FOR 21 DAYS, FOLLOWED BY 7 DAY REST PERIOD TO COMPLETE A 28 DAY TREATMENT CYCLE  Qty: 21 capsule, Refills: 2    Associated Diagnoses: Malignant neoplasm of overlapping sites of right female breast; Malignant neoplasm metastatic to lymph node of axilla; Secondary cancer of bone; Neoplasm related pain      letrozole (FEMARA) 2.5 mg Tab Take 1 tablet (2.5 mg total) by mouth once daily.  Qty: 30 tablet, Refills: 2    Associated Diagnoses: Malignant neoplasm of overlapping sites of right female breast; Malignant neoplasm metastatic to lymph node of axilla; Secondary cancer of bone; Neoplasm related pain; Anxiety; Depression, unspecified depression type      multivitamin (THERAGRAN) per tablet Take 1 tablet by mouth once daily.      albuterol 90 mcg/actuation inhaler Inhale 1-2 puffs into the lungs every 6 (six) hours as needed for Wheezing.  Qty: 1 Inhaler, Refills: 0      ondansetron (ZOFRAN-ODT) 4 MG TbDL Take 1 tablet (4 mg total) by mouth every 6 (six) hours as needed.  Qty: 10 tablet, Refills: 0           Time spent on the discharge of patient: >35 minutes    Denia Westbrook NP  Department of Hospital Medicine  [facility]

## 2017-05-25 NOTE — PLAN OF CARE
Problem: Patient Care Overview  Goal: Plan of Care Review  Outcome: Ongoing (interventions implemented as appropriate)  Patient AAO and VSS. Remains free of injury. Fall precautions maintained with bed low and wheels locked.  IVF administered as ordered. Pain adequately managed with prn meds. Wound Vac seal intact to bilateral upper armpits and left groin suctioning at 125 mmHg.  Chart review for 24 hours completed. Will continue to monitor till discharge.

## 2017-05-29 ENCOUNTER — TELEPHONE (OUTPATIENT)
Dept: PULMONOLOGY | Facility: CLINIC | Age: 47
End: 2017-05-29

## 2017-06-02 ENCOUNTER — TELEPHONE (OUTPATIENT)
Dept: ADMINISTRATIVE | Facility: OTHER | Age: 47
End: 2017-06-02

## 2017-06-02 NOTE — TELEPHONE ENCOUNTER
[facility] Initial Call:  This  attempted to reach patient/caregiver to provide resource and left a message requesting a return call.

## 2017-06-09 ENCOUNTER — TELEPHONE (OUTPATIENT)
Dept: HEMATOLOGY/ONCOLOGY | Facility: CLINIC | Age: 47
End: 2017-06-09

## 2017-06-09 DIAGNOSIS — C50.811 MALIGNANT NEOPLASM OF OVERLAPPING SITES OF RIGHT FEMALE BREAST: ICD-10-CM

## 2017-06-09 DIAGNOSIS — C77.3 MALIGNANT NEOPLASM METASTATIC TO LYMPH NODE OF AXILLA: ICD-10-CM

## 2017-06-09 DIAGNOSIS — G89.3 NEOPLASM RELATED PAIN: ICD-10-CM

## 2017-06-09 DIAGNOSIS — F41.9 ANXIETY: ICD-10-CM

## 2017-06-09 DIAGNOSIS — C79.51 SECONDARY CANCER OF BONE: ICD-10-CM

## 2017-06-09 DIAGNOSIS — F32.A DEPRESSION, UNSPECIFIED DEPRESSION TYPE: ICD-10-CM

## 2017-06-09 RX ORDER — HYDROCODONE BITARTRATE AND ACETAMINOPHEN 7.5; 325 MG/1; MG/1
1 TABLET ORAL EVERY 6 HOURS PRN
Qty: 60 TABLET | Refills: 0 | Status: SHIPPED | OUTPATIENT
Start: 2017-06-09 | End: 2017-06-28 | Stop reason: ALTCHOICE

## 2017-06-09 RX ORDER — ALPRAZOLAM 0.5 MG/1
0.5 TABLET ORAL 2 TIMES DAILY PRN
Qty: 60 TABLET | Refills: 0 | Status: SHIPPED | OUTPATIENT
Start: 2017-06-09 | End: 2017-06-22 | Stop reason: SDUPTHER

## 2017-06-09 NOTE — TELEPHONE ENCOUNTER
----- Message from Slime Klein sent at 6/9/2017  4:27 PM CDT -----  Contact: pt   Pt states that she needs a refill on pain pills and xanax.....[phone] (please notify when sent)          .  [facility] [address] [place] [id number]  Phone: [phone] Fax: [phone]

## 2017-06-11 ENCOUNTER — HOSPITAL ENCOUNTER (EMERGENCY)
Facility: HOSPITAL | Age: 47
Discharge: HOME | End: 2017-06-11
Attending: EMERGENCY MEDICINE
Payer: MEDICAID

## 2017-06-11 VITALS
HEART RATE: 76 BPM | OXYGEN SATURATION: 99 % | RESPIRATION RATE: 16 BRPM | BODY MASS INDEX: 32.96 KG/M2 | WEIGHT: 210 LBS | TEMPERATURE: 98 F | DIASTOLIC BLOOD PRESSURE: 75 MMHG | SYSTOLIC BLOOD PRESSURE: 134 MMHG | HEIGHT: 67 IN

## 2017-06-11 DIAGNOSIS — Z51.89 VISIT FOR WOUND CHECK: Primary | ICD-10-CM

## 2017-06-11 PROCEDURE — 96372 THER/PROPH/DIAG INJ SC/IM: CPT

## 2017-06-11 PROCEDURE — 63600175 PHARM REV CODE 636 W HCPCS: Performed by: EMERGENCY MEDICINE

## 2017-06-11 PROCEDURE — 99283 EMERGENCY DEPT VISIT LOW MDM: CPT | Mod: 25

## 2017-06-11 RX ORDER — MORPHINE SULFATE 4 MG/ML
8 INJECTION, SOLUTION INTRAMUSCULAR; INTRAVENOUS
Status: COMPLETED | OUTPATIENT
Start: 2017-06-11 | End: 2017-06-11

## 2017-06-11 RX ORDER — SULFAMETHOXAZOLE AND TRIMETHOPRIM 800; 160 MG/1; MG/1
1 TABLET ORAL 2 TIMES DAILY
Qty: 14 TABLET | Refills: 0 | Status: SHIPPED | OUTPATIENT
Start: 2017-06-11 | End: 2017-06-18

## 2017-06-11 RX ADMIN — MORPHINE SULFATE 8 MG: 4 INJECTION, SOLUTION INTRAMUSCULAR; INTRAVENOUS at 04:06

## 2017-06-11 NOTE — ED PROVIDER NOTES
SCRIBE #1 NOTE: I, Gentry Zimmerman, am scribing for, and in the presence of, Braeden Kern MD. I have scribed the entire note.      History      Chief Complaint   Patient presents with    Post-op Problem     lymph nodes and sweat glands removed at [facility] recently, c/o uncontrollable pain       Review of patient's allergies indicates:   Allergen Reactions    Vancomycin analogues Itching        HPI   HPI    6/11/2017, 3:48 PM   History obtained from the patient      History of Present Illness: Josey Flores is a 46 y.o. female patient who presents to the Emergency Department for post-op pain to multiple sites secondary to I&D which was done 5/19 by Dr. Wahl. Sx are located to bilateral axilla and left inguinal region. Sx are constant and moderate in severity. Sx are described as generalized pain. Pain is worse when changing dressings. There are no other mitigating or exacerbating factors noted.  Pt denies any fever, chills, N/V, CP, SOB, and all other sx at this time. Pt with hx of terminal cancer is here for pain control.No further complaints or concerns at this time.       Arrival mode: Personal vehicle     PCP: Aileen Sadler MD       Past Medical History:  Past Medical History:   Diagnosis Date    Cancer     metastatic-lymph nodes, bone, and thyroid        Past Surgical History:  Past Surgical History:   Procedure Laterality Date    ADENOIDECTOMY      APPENDECTOMY      CHOLECYSTECTOMY      TONSILLECTOMY           Family History:  No family history on file.    Social History:  Social History     Social History Main Topics    Smoking status: Current Every Day Smoker     Packs/day: 1.00     Types: Cigarettes    Smokeless tobacco: Never Used      Comment: no smoking after mn prior to surgery    Alcohol use No    Drug use: No    Sexual activity: Yes     Partners: Male       ROS   Review of Systems   Constitutional: Negative for chills and fever.   HENT: Negative for sore throat and trouble  swallowing.    Respiratory: Negative for cough and shortness of breath.    Cardiovascular: Negative for chest pain.   Gastrointestinal: Negative for abdominal pain, diarrhea, nausea and vomiting.   Genitourinary: Negative for dysuria.   Musculoskeletal: Negative for back pain.   Skin: Positive for wound (pain to I&D sites). Negative for rash.   Neurological: Negative for weakness and numbness.   Hematological: Does not bruise/bleed easily.   All other systems reviewed and are negative.      Physical Exam      Initial Vitals [06/11/17 1536]   BP Pulse Resp Temp SpO2   134/60 78 20 98 °F (36.7 °C) 96 %      Physical Exam  Nursing Notes and Vital Signs Reviewed.  Constitutional: Patient is in no acute distress. Awake and alert. Well-developed and well-nourished.  Head: Atraumatic. Normocephalic.  Eyes: PERRL. EOM intact. Conjunctivae are not pale. No scleral icterus.  ENT: Mucous membranes are moist. Oropharynx is clear and symmetric.    Neck: Supple. Full ROM. No lymphadenopathy.  Cardiovascular: Regular rate. Regular rhythm. No murmurs, rubs, or gallops. Distal pulses are 2+ and symmetric.  Pulmonary/Chest: No respiratory distress. Clear to auscultation bilaterally. No wheezing, rales, or rhonchi.  Abdominal: Soft and non-distended.  There is no tenderness.  No rebound, guarding, or rigidity.  Good bowel sounds.    Genitourinary: No CVA tenderness  Musculoskeletal: Moves all extremities. No obvious deformities. No edema. No calf tenderness.  Skin: Warm and dry.  Radha, female chaperone, was present for inspection of wounds. There are superficial wounds to bilateral axilla and left inguinal region that are red in coloration; without any drainage or evidence of abscess. There is a small area of induration involving hair follicle in the vicinity of the surgical wound to the left inguinal region. Wounds appear to be well kept.   Neurological:  Alert, awake, and appropriate.  Normal speech.  No acute focal neurological  "deficits are appreciated.  Psychiatric: Normal affect. Good eye contact. Appropriate in content.    ED Course    Procedures  ED Vital Signs:  Vitals:    06/11/17 1536   BP: 134/60   Pulse: 78   Resp: 20   Temp: 98 °F (36.7 °C)   SpO2: 96%   Weight: 95.3 kg (210 lb)   Height: 5' 7" (1.702 m)            The Emergency Provider reviewed the vital signs and test results, which are outlined above.    ED Discussion     5:00 PM: Reassessed pt at this time. Awake and alert. NAD.  Pt states her condition has improved at this time after tx with morphine. Discussed with pt all pertinent ED information. Discussed pt dx and plan of tx. Gave pt all f/u and return to the ED instructions. All questions and concerns were addressed at this time. Pt expresses understanding of information and instructions, and is comfortable with plan to discharge. Pt is stable for discharge.        I discussed with patient and/or family/caretaker that evaluation in the ED does not suggest any emergent or life threatening medical conditions requiring immediate intervention beyond what was provided in the ED, and I believe patient is safe for discharge.  Regardless, an unremarkable evaluation in the ED does not preclude the development or presence of a serious of life threatening condition. As such, patient was instructed to return immediately for any worsening or change in current symptoms.      ED Medication(s):  Medications   morphine injection 8 mg (8 mg Intramuscular Given 6/11/17 1611)       New Prescriptions    SULFAMETHOXAZOLE-TRIMETHOPRIM 800-160MG (BACTRIM DS) 800-160 MG TAB    Take 1 tablet by mouth 2 (two) times daily.       Follow-up Information     Aileen Sadler MD in 1 day.    Specialty:  Family Medicine  Why:  Patient should return to the ED for any concerns or worsening of condition.  Contact information:  [address] [facility]  [place] [id number]                      Medical Decision Making              Scribe Attestation: "   Scribe #1: I performed the above scribed service and the documentation accurately describes the services I performed. I attest to the accuracy of the note.    Attending:   Physician Attestation Statement for Scribe #1: I, Braeden Kern MD, personally performed the services described in this documentation, as scribed by Gentry Zimmerman in my presence, and it is both accurate and complete.          Clinical Impression       ICD-10-CM ICD-9-CM   1. Visit for wound check Z51.89 V58.89       Disposition:   Disposition: Discharged  Condition: Stable         Braeden Kern MD  06/14/17 1254

## 2017-06-12 ENCOUNTER — OFFICE VISIT (OUTPATIENT)
Dept: SURGERY | Facility: CLINIC | Age: 47
End: 2017-06-12
Payer: MEDICAID

## 2017-06-12 ENCOUNTER — LAB VISIT (OUTPATIENT)
Dept: LAB | Facility: HOSPITAL | Age: 47
End: 2017-06-12
Attending: INTERNAL MEDICINE
Payer: MEDICAID

## 2017-06-12 ENCOUNTER — ANESTHESIA EVENT (OUTPATIENT)
Dept: SURGERY | Facility: HOSPITAL | Age: 47
End: 2017-06-12
Payer: MEDICAID

## 2017-06-12 ENCOUNTER — TELEPHONE (OUTPATIENT)
Dept: HEMATOLOGY/ONCOLOGY | Facility: CLINIC | Age: 47
End: 2017-06-12

## 2017-06-12 ENCOUNTER — OFFICE VISIT (OUTPATIENT)
Dept: HEMATOLOGY/ONCOLOGY | Facility: CLINIC | Age: 47
End: 2017-06-12
Payer: MEDICAID

## 2017-06-12 ENCOUNTER — TELEPHONE (OUTPATIENT)
Dept: SURGERY | Facility: CLINIC | Age: 47
End: 2017-06-12

## 2017-06-12 VITALS
WEIGHT: 214 LBS | BODY MASS INDEX: 33.52 KG/M2 | SYSTOLIC BLOOD PRESSURE: 98 MMHG | HEART RATE: 77 BPM | DIASTOLIC BLOOD PRESSURE: 65 MMHG | TEMPERATURE: 98 F

## 2017-06-12 VITALS
SYSTOLIC BLOOD PRESSURE: 118 MMHG | BODY MASS INDEX: 33.74 KG/M2 | HEIGHT: 67 IN | HEART RATE: 84 BPM | OXYGEN SATURATION: 96 % | WEIGHT: 214.94 LBS | DIASTOLIC BLOOD PRESSURE: 79 MMHG | TEMPERATURE: 98 F

## 2017-06-12 DIAGNOSIS — T14.8XXA OPEN WOUND: ICD-10-CM

## 2017-06-12 DIAGNOSIS — L73.2 HIDRADENITIS: ICD-10-CM

## 2017-06-12 DIAGNOSIS — C79.51 SECONDARY CANCER OF BONE: ICD-10-CM

## 2017-06-12 DIAGNOSIS — C77.3 BREAST CANCER METASTASIZED TO AXILLARY LYMPH NODE, RIGHT: ICD-10-CM

## 2017-06-12 DIAGNOSIS — L73.2 HIDRADENITIS AXILLARIS: Primary | ICD-10-CM

## 2017-06-12 DIAGNOSIS — C50.811 MALIGNANT NEOPLASM OF OVERLAPPING SITES OF RIGHT FEMALE BREAST: ICD-10-CM

## 2017-06-12 DIAGNOSIS — C50.911 BREAST CANCER METASTASIZED TO AXILLARY LYMPH NODE, RIGHT: ICD-10-CM

## 2017-06-12 DIAGNOSIS — C77.3 MALIGNANT NEOPLASM METASTATIC TO LYMPH NODE OF AXILLA: ICD-10-CM

## 2017-06-12 DIAGNOSIS — C50.811 MALIGNANT NEOPLASM OF OVERLAPPING SITES OF RIGHT FEMALE BREAST: Primary | ICD-10-CM

## 2017-06-12 LAB
ALBUMIN SERPL BCP-MCNC: 3.2 G/DL
ALP SERPL-CCNC: 74 U/L
ALT SERPL W/O P-5'-P-CCNC: 11 U/L
ANION GAP SERPL CALC-SCNC: 12 MMOL/L
AST SERPL-CCNC: 14 U/L
BASOPHILS # BLD AUTO: 0.03 K/UL
BASOPHILS NFR BLD: 0.6 %
BILIRUB SERPL-MCNC: 0.3 MG/DL
BUN SERPL-MCNC: 9 MG/DL
CALCIUM SERPL-MCNC: 8.7 MG/DL
CHLORIDE SERPL-SCNC: 104 MMOL/L
CO2 SERPL-SCNC: 24 MMOL/L
CREAT SERPL-MCNC: 1 MG/DL
DIFFERENTIAL METHOD: ABNORMAL
EOSINOPHIL # BLD AUTO: 0.1 K/UL
EOSINOPHIL NFR BLD: 2.7 %
ERYTHROCYTE [DISTWIDTH] IN BLOOD BY AUTOMATED COUNT: 13.8 %
EST. GFR  (AFRICAN AMERICAN): >60 ML/MIN/1.73 M^2
EST. GFR  (NON AFRICAN AMERICAN): >60 ML/MIN/1.73 M^2
GLUCOSE SERPL-MCNC: 101 MG/DL
HCT VFR BLD AUTO: 30.9 %
HGB BLD-MCNC: 10.5 G/DL
LYMPHOCYTES # BLD AUTO: 2.3 K/UL
LYMPHOCYTES NFR BLD: 47.6 %
MCH RBC QN AUTO: 36.2 PG
MCHC RBC AUTO-ENTMCNC: 34 %
MCV RBC AUTO: 107 FL
MONOCYTES # BLD AUTO: 0.3 K/UL
MONOCYTES NFR BLD: 5.4 %
NEUTROPHILS # BLD AUTO: 2.1 K/UL
NEUTROPHILS NFR BLD: 43.7 %
PLATELET # BLD AUTO: 225 K/UL
PMV BLD AUTO: 10.6 FL
POTASSIUM SERPL-SCNC: 4.1 MMOL/L
PROT SERPL-MCNC: 6.9 G/DL
RBC # BLD AUTO: 2.9 M/UL
SODIUM SERPL-SCNC: 140 MMOL/L
WBC # BLD AUTO: 4.81 K/UL

## 2017-06-12 PROCEDURE — 99024 POSTOP FOLLOW-UP VISIT: CPT | Mod: ,,, | Performed by: SURGERY

## 2017-06-12 PROCEDURE — 36415 COLL VENOUS BLD VENIPUNCTURE: CPT

## 2017-06-12 PROCEDURE — 87040 BLOOD CULTURE FOR BACTERIA: CPT | Mod: 59

## 2017-06-12 PROCEDURE — 99213 OFFICE O/P EST LOW 20 MIN: CPT | Mod: PBBFAC,PO | Performed by: INTERNAL MEDICINE

## 2017-06-12 PROCEDURE — 99999 PR PBB SHADOW E&M-EST. PATIENT-LVL V: CPT | Mod: PBBFAC,,, | Performed by: SURGERY

## 2017-06-12 PROCEDURE — 99215 OFFICE O/P EST HI 40 MIN: CPT | Mod: S$PBB,,, | Performed by: INTERNAL MEDICINE

## 2017-06-12 PROCEDURE — 99999 PR PBB SHADOW E&M-EST. PATIENT-LVL III: CPT | Mod: PBBFAC,,, | Performed by: INTERNAL MEDICINE

## 2017-06-12 PROCEDURE — 85025 COMPLETE CBC W/AUTO DIFF WBC: CPT

## 2017-06-12 PROCEDURE — 80053 COMPREHEN METABOLIC PANEL: CPT

## 2017-06-12 RX ORDER — OXYCODONE AND ACETAMINOPHEN 10; 325 MG/1; MG/1
1 TABLET ORAL EVERY 4 HOURS PRN
Qty: 30 TABLET | Refills: 0 | Status: SHIPPED | OUTPATIENT
Start: 2017-06-12 | End: 2017-06-22 | Stop reason: SDUPTHER

## 2017-06-12 RX ORDER — POTASSIUM CHLORIDE 20 MEQ/1
20 TABLET, EXTENDED RELEASE ORAL DAILY
Qty: 30 TABLET | Refills: 1 | Status: SHIPPED | OUTPATIENT
Start: 2017-06-12 | End: 2017-11-09 | Stop reason: SDUPTHER

## 2017-06-12 RX ORDER — ONDANSETRON 4 MG/1
8 TABLET, ORALLY DISINTEGRATING ORAL EVERY 8 HOURS PRN
Status: CANCELLED | OUTPATIENT
Start: 2017-06-12

## 2017-06-12 RX ORDER — FENTANYL 50 UG/1
1 PATCH TRANSDERMAL
Qty: 10 PATCH | Refills: 0 | Status: SHIPPED | OUTPATIENT
Start: 2017-06-12 | End: 2017-09-14 | Stop reason: ALTCHOICE

## 2017-06-12 RX ORDER — FUROSEMIDE 20 MG/1
20 TABLET ORAL DAILY
Qty: 30 TABLET | Refills: 1 | Status: SHIPPED | OUTPATIENT
Start: 2017-06-12 | End: 2017-11-09 | Stop reason: SDUPTHER

## 2017-06-12 NOTE — PROGRESS NOTES
Reason for visit: Right breast carcinoma  Date of Diagnosis: January 12, 2017    HPI:   The patient is a 46-year-old  female who presents to the hematology oncology clinic today for follow up for metastatic right breast carcinoma.    I have reviewed all of the patient's relevant clinical history available in the medical record and have utilized this in my evaluation and management recommendations today.  The patient had 2 separate areas in the right breast that were biopsied area the first was a right breast mass at 11:00 which was 3 cm from the nipple which showed invasive mammary carcinoma.  The invasive carcinoma measured at least 1.2 cm in greatest dimension and appeared high-grade.  This tumor was ER positive/NV positive/HER-2 negative.  The second breast mass was biopsied from the retroperitoneal area lower area and was also positive for invasive mammary carcinoma.  This measured at least 0.4 cm and also appeared high-grade.  The tumor is morphologically very similar to the tumor in the prior specimen.  Receptor studies were not done on this specimen.  The patient also had a palpable right axillary lymph node which was biopsied and showed metastatic mammary carcinoma which measured at least 1.3 cm in greatest dimension.  The patient had self palpated this breast mass.  In addition the patient has a palpable mass in the region of her right elbow which she reports has been present for 9 years but had been slowly growing especially over the past year or 2.  She denies any pain associated with this.    Today the patient reports that she is in a lot of pain especially when she does wound dressing changes after her abscesses/hydradenitis was debrided in bilateral axillary regions and left groin in May 2017.  She reports that her Norco when necessary is not controlling this adequately.  She also reports fluid and swelling in both her legs since her hospitalization.    She is getting home health assistance.   She reports slow and steady healing of the wounds.  She has follow-up scheduled with Dr. Wahl later this morning.  She denies any fevers. Reports chills. Denies night sweats.  She denies any melena, hematochezia, hematemesis, hemoptysis or hematuria.  She denies any bowel or urinary complaints.  She denies any nausea, vomiting or abdominal pain. She reports chronic cough.  She continues on ibrance and letrozole. Ibrance was started on 2/21/17.    PAST MEDICAL HISTORY:   1. HSIL on pap smear s/p LEEP    SURGICAL HISTORY:   1.  Tonsillectomy and adenoidectomy  2.  Appendectomy  3.  Cholecystectomy  4.  D&C    FAMILY HISTORY: She reports that her maternal aunt had lung cancer in her 70s.  She used to smoke cigarettes.  Her maternal cousin had lung cancer in her 60s.  She used to smoke cigarettes.  She denies any other immediate family members with cancer or bleeding/clotting disorders.    SOCIAL HISTORY: She reports a 45+ pack year smoking history and currently smokes one and a half packs of cigarettes a day.  She drinks wine occasionally.  She denies any history of recreational drug use.  She is engaged and has 2 sons.  She works for [employer].  She lives in [place], [place].     ALLERGIES: [NKDA]    MEDICATIONS: [Medcard has been reviewed and/or reconciled.]    REVIEW OF SYSTEMS:   GENERAL: [No fevers, chills or sweats. Reports fatigue. Reports weight gain. Denies loss of appetite.]  HEENT: [No blurred vision, tinnitus, nasal discharge, sorethroat or dysphagia.]  HEART: [No chest pain, palpitations or shortness of breath.]    LUNGS: [Reports chronic cough. Denies hemoptysis or breathing problems.]  ABDOMEN: [No abdominal pain, nausea, vomiting, diarrhea, constipation or melena.]  GENITOURINARY: [No dysuria, bleeding or malodorous discharge.]  NEURO: [No headache, dizziness or vertigo.]  HEMATOLOGY: [No easy bruising, spontaneous bleeding or blood clots in the past].  MUSCULOSKELETAL: [No  arthralgias, myalgias or bone pains.]  SKIN: [No rashes or skin lesions.]  PSYCHIATRY: [No depression. Reports anxiety.]  She denies any suicidal or homicidal ideation.    PHYSICAL EXAMINATION:   VS: Reviewed on nurse's notes.  APPEARANCE: The patient is a well-developed, well-nourished and well-groomed  female who appears in mild distress from pain. She was accompanied to this clinic visit by her fiance.  HEENT: No scleral icterus. Both external auditory canals clear. No oral ulcers, lesions. Throat clear  HEAD: No sinus tenderness.  NECK: Supple. No palpable lymphadenopathy. Thyroid non-tender, no palpable masses  CHEST: Breath sounds clear bilaterally. No rales. No rhonchi. Unlabored respirations.  BREAST: Deferred today.  CARDIOVASCULAR: Normal S1, S2. Normal rate. Regular rhythm.  ABDOMEN: Bowel sounds normal. No tenderness. No abdominal distention. No hepatomegaly. No splenomegaly.  LYMPHATIC: No palpable supraclavicular lymphadenopathy. Bilateral axillary healing wounds noted.  EXTREMITIES: No clubbing, cyanosis. Palpable firm 3 cm mass in the right elbow is noted.  Left groin healing wound noted. 1+ edema in bilateral lower extremities.  SKIN: No lesions. No petechiae. No ecchymoses.   NEUROLOGIC: No focal findings. Alert & Oriented x 3. Mood appropriate to affect      LABS:   Reviewed    IMAGING:  Reviewed    IMPRESSION:  1.  Metastatic multifocal infiltrating ductal carcinoma of the right breast with right axillary and bone involvement [ER positive/CT positive/HER-2 negative]  2.  FDG avid right thyroid nodule  3.  Tobacco abuse  4.  Right elbow mass  5.  Anxiety and depression  6.  Chronic cough  7.  Right axillary hydradenitis/cellulitis and left inguinal hydradenitis/cellulitis s/p debridement/wound vac    PLAN:  1.  I had a detailed discussion with the patient previously with regard to the diagnosis, prognosis and treatment options for metastatic multifocal invasive ductal carcinoma of the  right breast with right axillary and bone involvement.  2.  I have discussed that at this time her metastatic malignancy is potentially treatable but not curable.  3.  Results of PET/CT scan from January 23, 2017 were discussed in detail with the patient and her family members today. There are 3 osseous metastatic lesions one involving the manubrium and 2 involving the posterior left ilium most consistent with metastatic disease. She also has FDG avid right thyroid nodule which is highly suspicious for possible malignancy as well.    4.  The patient was strongly encouraged to quit smoking.  She expressed understanding.  Prescription for Xanax when necessary anxiety was previously given to the patient after full discussion of sedation precautions.    5. FSH and estradiol levels confirm menopausal status. TSH lab was normal.   6.  MRI of the brain on 1/31/17 to evaluate for any evidence of metastatic disease to complete staging workup was negative for metastatic malignancy to the brain. CT head done in March 2017 in ED was also ok.  7. Continue to proceed with ibrance/letrozole.  She appears to be tolerating this well so far with no significant side effects.   8. Her medical insurance denied genetic testing at [facility] with regard to her breast cancer. I will look at alternative lab options where this might be covered.  9. She has been advised to take calcium and vit d supplementation everyday. She is on xgeva injections for treatment of bone involvement by malignancy. Risks/benefits and common side effects discussed and she expressed understanding and agreement. She has dentures.  10. I will check labs today for follow up.  I will also check blood cultures.  11. Prescription for Lasix for treatment of bilateral lower extremity edema was given to the patient today.  Supplemental potassium was given as well.  She will let me know if no improvement by the end of this week.  12. Prescription for fentanyl patches was  given to help with long-term pain management.  Risks/benefits and sedation/constipation precautions were discussed in detail and she expressed understanding.  13.  I will plan on obtaining PET/CT scan in the near future once wound healing is complete.  Physical examination does show good response to ongoing treatment for metastatic breast carcinoma.    Return to clinic in 2 weeks.  Possible Xgeva at follow up.  She knows to call sooner for any new problems or questions.    I spent greater than 40 minutes face-to-face with the patient discussing and reviewing all of the above in detail with greater than 50% of this time spent in counseling.      Richard Delong MD

## 2017-06-12 NOTE — TELEPHONE ENCOUNTER
----- Message from Lupis Mauro sent at 6/12/2017  9:39 AM CDT -----  Contact: [facility]/ Saira Martinez is calling Nurse staff regarding the patient is with Home Health now. Saira call back [phone] thanks

## 2017-06-12 NOTE — TELEPHONE ENCOUNTER
Returned call no answer,left message with answering service to call office, contact information was given.

## 2017-06-12 NOTE — PATIENT INSTRUCTIONS
"The skin graft to cover the armpit areas is scheduled for Friday at 7 in the morning.  The hospital call you with a time for arrival.    No solid food after midnight the night before but you may have clear liquids up to 3 hours before your surgery.    No preop studies are required.    Please bring the wound VAC with you as we will leave it on for 6 days after the surgery.  He will need to come back and see us next Thursday to remove a wound VAC and check on the skin graft.    We will take the skin to cover your armpits from your left leg and that will likely be the most painful part of the surgery.  We will inject some long-acting local anesthetic to help make this more comfortable.    [facility]  Instructions for Patients and Families    You are invited to share your experience with me and my staff.  If you receive a survey in the mail, please return it at your convenience, or complete a brief survey on [employer].  We value your opinion!        Did you know that [facility] can be used to make appointments?  Just select "Schedule Appointment" under the "Visits" menu.    Notify you if any of your preop laboratories show abnormalities.  I    Before surgery:  The pre-op nurse from the hospital will call you before the day of your surgery to confirm your arrival time.  The time of your surgery may change due to emergencies or other unforeseen events.  Do not eat or drink anything after midnight the night before your procedure, except for clear liquids up to three hours before your surgery time, and sips of water with medication.  If you are not diabetic, it is recommended that you drink a glass of clear fruit juice (apple, grape, cranberry, not orange) three hours before your surgery time, but nothing after that.  If you are diabetic, you may have water or sugar-free clear liquids such as Crystal Light up to three hours before your surgery time.    Day of Surgery:  · You will go to a " pre-op area where an IV will be started and you will speak to the anesthesiologist and surgeon.  · Your family will be updated throughout the operation.  After surgery, your family member may be taken to a private room for consultation with the surgeon.  This is for the privacy of your medical information and does not necessarily mean there is anything wrong.    If your incisions have:  · Glue:  You may take a bath or shower immediately and wash your skin as you normally do.  The glue will eventually crumble or peel off. Do not let your incisions soak under water.  · Strips: Leave them on, but it is OK if they fall off on their own. It is OK to get them wet 48 hours after surgery.  · Bandage: You may remove it 2 days after your surgery, and then you may leave the incision open and take a shower or bath, unless otherwise instructed. If your bandage has clear plastic, you may shower with it on and then remove it 2 days after surgery.    Activities  · Walking is recommended after surgery; bed rest is not recommended unless specifically ordered.  · If you have had abdominal surgery, do not lift over 20 pounds for 4 weeks after surgery.  · If you have had hernia surgery, do not lift over 20 pounds for 6 weeks after surgery.  · You may drive when you are off your post-operative pain medication.  · Do not smoke after surgery, it decreases your ability to heal and increases the risk of infection and pneumonia.    Diet:  Drink lots of fluids after surgery.  You might not have much of an appetite at first, you may eat regular food when you feel ready, unless you are given special diet instructions.    Post-operative symptoms and medications  · It is safe to take over-the-counter medications for constipation, heartburn, sleep, or itching if needed.  Prescription pain medication may contain acetaminophen (Tylenol), so you should not take additional acetaminophen (Tylenol) at the same time as your pain medication.  · You may  "experience nausea, low fever/chills, and clear drainage from your incision, sometimes up to a month after surgery.  Notify our office if you have fever over 101 degrees, worsening redness around your incision, thick cloudy drainage, or inability to drink any liquids.  · You will experience some level of pain after surgery.  Your pain medication should help with the pain, but may not be able to eliminate it entirely.  Pain will decrease with time, and most pain will be gone by 4 to 6 weeks after surgery.  · We are not able to call in prescriptions for pain medication after hours or on weekends.  If your pain medication is ineffective or you will run out soon and need a refill, please call our office at [phone].  We are not able to replace pain medication that has been lost or stolen.    After surgery, you will either be discharged home or admitted to the hospital.  If you are admitted to the hospital, one of the surgeons or a physician assistant will see you once a day.  Due to scheduled surgery, we may see you in the afternoon or at night; however, your nurse is able to page us at any time.  If you feel there is a situation that is not being addressed properly, please dial [phone] from the phone in your room.    Follow-up appointment  · You will see your surgeon or a physician assistant in clinic for a follow-up appointment at either our [facility] (off [facility]) or [facility] (off [facility]) locations, usually between one and four weeks after your surgery.  · The hospital nurses can make your follow up appointment, or you can make it online at [email or web address] or call [phone].  · If you have a smartphone with the [email or web address] matt, please let us know if you would like to do a phone visit instead of a post-op office visit.    If you are signed up for [facility], install the "[email or web address]" matt to access your test results, send messages to your doctors, and schedule appointments from your smartphone!    "

## 2017-06-12 NOTE — TELEPHONE ENCOUNTER
----- Message from Lupis Mauro sent at 6/12/2017  9:37 AM CDT -----  Contact:  [facility]/ Saira  Pt is calling Nurse staff regarding the pt is in Home Health. Pt call back [phone] thanks

## 2017-06-12 NOTE — PROGRESS NOTES
Patient ID: Josey Flores is a 46 y.o. female.    Open axillary wounds of the excision of hidradenitis        Chief Complaint: Post-op Evaluation      HPI:  Patient underwent excision of bilateral axillary hidradenitis.  She presents now for follow-up visit.    She states that both wounds are doing well.  She is having some pain especially with wound care.    The patient also has metastatic breast cancer for which she is receiving chemotherapy    She has not smoked since he left the hospital        Review of Systems   Constitutional: Negative for appetite change, chills, fatigue, fever and unexpected weight change.   HENT: Negative for hearing loss and rhinorrhea.    Eyes: Negative for visual disturbance.   Respiratory: Negative for apnea, cough, shortness of breath and wheezing.    Cardiovascular: Negative for chest pain and palpitations.   Gastrointestinal: Negative for abdominal distention, abdominal pain, blood in stool, constipation, diarrhea, nausea and vomiting.   Genitourinary: Negative for dysuria, frequency and urgency.   Musculoskeletal: Negative for arthralgias and neck pain.   Skin: Negative for rash.        Open wounds of the axilla bilaterally and left groin   Neurological: Negative for seizures, weakness, numbness and headaches.   Hematological: Negative for adenopathy. Does not bruise/bleed easily.   Psychiatric/Behavioral: Negative for hallucinations. The patient is not nervous/anxious.        Current Outpatient Prescriptions   Medication Sig Dispense Refill    alprazolam (XANAX) 0.5 MG tablet Take 1 tablet (0.5 mg total) by mouth 2 (two) times daily as needed for Insomnia or Anxiety. 60 tablet 0    citalopram (CELEXA) 20 MG tablet Take 1 tablet (20 mg total) by mouth once daily. 30 tablet 2    ergocalciferol (VITAMIN D2) 50,000 unit Cap Take 50,000 Units by mouth every 7 days.      fentaNYL (DURAGESIC) 50 mcg/hr Place 1 patch onto the skin every 72 hours. 10 patch 0    furosemide (LASIX)  20 MG tablet Take 1 tablet (20 mg total) by mouth once daily. 30 tablet 1    hydrocodone-acetaminophen 7.5-325mg (NORCO) 7.5-325 mg per tablet Take 1 tablet by mouth every 6 (six) hours as needed for Pain. 60 tablet 0    IBRANCE 125 mg Cap TAKE 1 CAPSULE (125 MG) DAILY FOR 21 DAYS, FOLLOWED BY 7 DAY REST PERIOD TO COMPLETE A 28 DAY TREATMENT CYCLE 21 capsule 2    letrozole (FEMARA) 2.5 mg Tab Take 1 tablet (2.5 mg total) by mouth once daily. 30 tablet 2    multivitamin (THERAGRAN) per tablet Take 1 tablet by mouth once daily.      ondansetron (ZOFRAN-ODT) 4 MG TbDL Take 1 tablet (4 mg total) by mouth every 6 (six) hours as needed. 10 tablet 0    potassium chloride SA (K-DUR,KLOR-CON) 20 MEQ tablet Take 1 tablet (20 mEq total) by mouth once daily. 30 tablet 1    sulfamethoxazole-trimethoprim 800-160mg (BACTRIM DS) 800-160 mg Tab Take 1 tablet by mouth 2 (two) times daily. 14 tablet 0    albuterol 90 mcg/actuation inhaler Inhale 1-2 puffs into the lungs every 6 (six) hours as needed for Wheezing. 1 Inhaler 0     No current facility-administered medications for this visit.        Review of patient's allergies indicates:   Allergen Reactions    Vancomycin analogues Itching       Past Medical History:   Diagnosis Date    Cancer     metastatic-lymph nodes, bone, and thyroid        Past Surgical History:   Procedure Laterality Date    ADENOIDECTOMY      APPENDECTOMY      CHOLECYSTECTOMY      TONSILLECTOMY         No family history on file.    Social History     Social History    Marital status:      Spouse name: N/A    Number of children: N/A    Years of education: N/A     Occupational History    Not on file.     Social History Main Topics    Smoking status: Current Every Day Smoker     Packs/day: 1.00     Types: Cigarettes    Smokeless tobacco: Never Used      Comment: no smoking after mn prior to surgery    Alcohol use No    Drug use: No    Sexual activity: Yes     Partners: Male     Other  Topics Concern    Not on file     Social History Narrative    No narrative on file       Vitals:    06/12/17 1215   BP: 98/65   Pulse: 77   Temp: 97.8 °F (36.6 °C)       Physical Exam   Constitutional: She is oriented to person, place, and time. She appears well-developed and well-nourished.   Looks older than stated age   HENT:   Head: Normocephalic.   Eyes: EOM are normal. Pupils are equal, round, and reactive to light.   Neck: No JVD present. No tracheal deviation present. No thyromegaly present.   Cardiovascular: Normal rate, regular rhythm and normal heart sounds.    Pulmonary/Chest: Breath sounds normal. She has no wheezes.   Abdominal: Soft. Bowel sounds are normal. She exhibits no distension and no mass. There is no hepatosplenomegaly. There is no tenderness. There is no rigidity, no rebound and no guarding.   Musculoskeletal: Normal range of motion. She exhibits no edema.   Lymphadenopathy:     She has no cervical adenopathy.   Neurological: She is oriented to person, place, and time.   Skin: Skin is warm and dry. No rash noted. No erythema.   There are however open wounds of the right and left axilla, slightly larger on the right with good granulation bed.  There is a small open wound of the left groin and a area of erythema and tenderness above the   Psychiatric: She has a normal mood and affect.   Vitals reviewed.      Assessment & Plan:    well granulated open wounds of bilateral axilla after excision of hidradenitis.  These now ready for skin grafting.  Split-thickness skin graft to bilateral axillas.    Small abscess of the left groin, incision and drainage.    The risks of the skin graft include infection and bleeding and graft failure.  The risk associated with incision and drainage are infection, bleeding and and the need to debride a large amount of tissue

## 2017-06-12 NOTE — ANESTHESIA PREPROCEDURE EVALUATION
06/12/2017  Josey Flores is a 46 y.o., female .   1. Metastatic multifocal infiltrating ductal carcinoma of the right breast with right axillary and bone involvement [ER positive/SD positive/HER-2 negative]  2.  FDG avid right thyroid nodule  3.  Tobacco abuse  4.  Right elbow mass  5.  Anxiety and depression  6.  Chronic cough  7.  Right axillary hydradenitis/cellulitis and left inguinal hydradenitis/cellulitis.    Chart review conducted 6/12/17  Anesthesia Evaluation    I have reviewed the Patient Summary Reports.    I have reviewed the Nursing Notes.   I have reviewed the Medications.     Review of Systems  Anesthesia Hx:  No problems with previous Anesthesia  Denies Family Hx of Anesthesia complications.   Denies Personal Hx of Anesthesia complications.   Social:  Smoker    Hematology/Oncology:         -- Anemia: Hematology Comments: Metastatic breast cancer Current/Recent Cancer. Breast right   EENT/Dental:EENT/Dental Normal   Cardiovascular:   Exercise tolerance: good    Pulmonary:  Pulmonary Normal Chronic cough   Renal/:  Renal/ Normal     Hepatic/GI:  Hepatic/GI Normal    Musculoskeletal:  Musculoskeletal Normal Right elbow mass  Recent hiradenitis and discharge from hosipital. Abscess in groin and axillia   Neurological:  Neurology Normal    Endocrine:  Endocrine Normal    Dermatological:  Skin Normal    Psych:   anxiety depression          Physical Exam  General:  Obesity    Airway/Jaw/Neck:  Airway Findings: Mouth Opening: Normal Mallampati: II      Dental:  Dental Findings: Edentulous   Chest/Lungs:  Chest/Lungs Findings: Clear to auscultation     Heart/Vascular:  Heart Findings:       Mental Status:  Mental Status Findings:  Cooperative         Anesthesia Plan  Type of Anesthesia, risks & benefits discussed:  Anesthesia Type:  general  Patient's Preference:   Intra-op Monitoring Plan:    Intra-op Monitoring Plan Comments:   Post Op Pain Control Plan:   Post Op Pain Control Plan Comments:   Induction:   IV  Beta Blocker:  Patient is not currently on a Beta-Blocker (No further documentation required).       Informed Consent: Patient understands risks and agrees with Anesthesia plan.  Questions answered. Anesthesia consent signed with patient.  ASA Score: 3     Day of Surgery Review of History & Physical: 5/19/17. There are no significant changes.          Ready For Surgery From Anesthesia Perspective.

## 2017-06-12 NOTE — TELEPHONE ENCOUNTER
----- Message from Richard Delong MD sent at 6/12/2017  2:01 PM CDT -----  All lab results from today look good. Please let her know. Thanks!

## 2017-06-13 ENCOUNTER — TELEPHONE (OUTPATIENT)
Dept: SURGERY | Facility: CLINIC | Age: 47
End: 2017-06-13

## 2017-06-13 NOTE — TELEPHONE ENCOUNTER
Spoke to Hugo with [facility], she was advised that Dr. Wahl's office note was faxed to number provided, she voiced understanding.

## 2017-06-13 NOTE — TELEPHONE ENCOUNTER
----- Message from Pam Arora MA sent at 6/13/2017  1:21 PM CDT -----  Contact: Saira with [facility]       ----- Message -----  From: Sherry Young  Sent: 6/13/2017   1:00 PM  To: Maryuri Soares Staff    Saira called and stated she needed to speak to the nurse. She stated that she needs to get the progress notes and orders for the pt. She can be reached at  [phone].    Thanks,  TF

## 2017-06-15 ENCOUNTER — TELEPHONE (OUTPATIENT)
Dept: SURGERY | Facility: CLINIC | Age: 47
End: 2017-06-15

## 2017-06-15 NOTE — TELEPHONE ENCOUNTER
----- Message from Chloe Kidd sent at 6/15/2017 11:57 AM CDT -----  Contact: pt  She's returning a missed call, please advise, [phone] (home)

## 2017-06-15 NOTE — PRE ADMISSION SCREENING
Pre op instructions reviewed with patient per phone:    To confirm, Your surgeon has instructed you:  Surgery is scheduled 06/16/17 at 0700.      Please report to [facility] floor main lobby by 0530  Pre admit office to call later today only if arrival time changes.      INSTRUCTIONS IMPORTANT!!!  ¨ Do not eat, drink, or smoke after 12 midnight, may have water and clear juice until 3h prior to surgery. OK to brush teeth, no gum, candy or mints!    ¨ Take only these medicines with a small swallow of water-morning of surgery.  Celexa, xanax, prn        ____  Do not wear makeup, including mascara.  ____  No powder, lotions or creams to surgical area.  ____  Please remove all jewelry, including piercings and leave at home.  ____  No money or valuables needed. Please leave at home.  ____  Please bring identification and insurance information to hospital.  ____  If going home the same day, arrange for a ride home. You will not be able to   drive if Anesthesia was used.  ____  Children, under 12 years old, must remain in the waiting room with an adult.  They are not allowed in patient areas.  ____  Wear loose fitting clothing. Allow for dressings, bandages.  ____  Stop Aspirin, Ibuprofen, Motrin and Aleve at least 5-7 days before surgery, unless otherwise instructed by your doctor, or the nurse.   You MAY use Tylenol/acetaminophen until day of surgery.  ____  If you take diabetic medication, do not take am of surgery unless instructed by   Doctor.  ____ Stop taking any Fish Oil supplement or any Vitamins that contain Vitamin E at least 5 days prior to surgery.          Bathing Instructions-- The night before surgery and the morning prior to coming to the hospital:   -Do not shave the surgical area.   -Shower and wash your hair and body as usual with anti-bacterial  soap and shampoo.   -Rinse your hair and body completely.   -Use one packet of hibiclens to wash the surgical site (using your  hand) gently for 5 minutes.  Do not scrub you skin too hard.   -Do not use hibiclens on your head, face, or genitals.   -Do not wash with anti-bacterial soap after you use the hibiclens.   -Rinse your body thoroughly.   -Dry with clean, soft towel.  Do not use lotion, cream, deodorant, or powders on   the surgical site.    Use antibacterial soap in place of hibiclens if your surgery is on the head, face or genitals.         Surgical Site Infection    Prevention of surgical site infections:     -Keep incisions clean and dry.   -Do not soak/submerge incisions in water until completely healed.   -Do not apply lotions, powders, creams, or deodorants to site.   -Always make sure hands are cleaned with antibacterial soap/ alcohol-based   prior to touching the surgical site.  (This includes doctors, nurses, staff, and yourself.)    Signs and symptoms:   -Redness and pain around the area where you had surgery   -Drainage of cloudy fluid from your surgical wound   -Fever over 100.4  I have read or had read and explained to me, and understand the above information.

## 2017-06-16 ENCOUNTER — SURGERY (OUTPATIENT)
Age: 47
End: 2017-06-16

## 2017-06-16 ENCOUNTER — ANESTHESIA (OUTPATIENT)
Dept: SURGERY | Facility: HOSPITAL | Age: 47
End: 2017-06-16
Payer: MEDICAID

## 2017-06-16 ENCOUNTER — HOSPITAL ENCOUNTER (OUTPATIENT)
Facility: HOSPITAL | Age: 47
Discharge: HOME | End: 2017-06-18
Attending: SURGERY | Admitting: SURGERY
Payer: MEDICAID

## 2017-06-16 DIAGNOSIS — L73.2 HIDRADENITIS AXILLARIS: ICD-10-CM

## 2017-06-16 DIAGNOSIS — T14.8XXA OPEN WOUND: ICD-10-CM

## 2017-06-16 PROCEDURE — C1729 CATH, DRAINAGE: HCPCS | Performed by: SURGERY

## 2017-06-16 PROCEDURE — 63600175 PHARM REV CODE 636 W HCPCS: Performed by: NURSE ANESTHETIST, CERTIFIED REGISTERED

## 2017-06-16 PROCEDURE — 25000003 PHARM REV CODE 250: Performed by: NURSE ANESTHETIST, CERTIFIED REGISTERED

## 2017-06-16 PROCEDURE — 37000008 HC ANESTHESIA 1ST 15 MINUTES: Performed by: SURGERY

## 2017-06-16 PROCEDURE — 27100025 HC TUBING, SET FLUID WARMER: Performed by: NURSE ANESTHETIST, CERTIFIED REGISTERED

## 2017-06-16 PROCEDURE — 63600175 PHARM REV CODE 636 W HCPCS: Performed by: SURGERY

## 2017-06-16 PROCEDURE — 27201423 OPTIME MED/SURG SUP & DEVICES STERILE SUPPLY: Performed by: SURGERY

## 2017-06-16 PROCEDURE — 36000706: Performed by: SURGERY

## 2017-06-16 PROCEDURE — 25000003 PHARM REV CODE 250: Performed by: SURGERY

## 2017-06-16 PROCEDURE — 25000003 PHARM REV CODE 250: Performed by: ANESTHESIOLOGY

## 2017-06-16 PROCEDURE — 71000039 HC RECOVERY, EACH ADD'L HOUR: Performed by: SURGERY

## 2017-06-16 PROCEDURE — 37000009 HC ANESTHESIA EA ADD 15 MINS: Performed by: SURGERY

## 2017-06-16 PROCEDURE — 36000707: Performed by: SURGERY

## 2017-06-16 PROCEDURE — 71000033 HC RECOVERY, INTIAL HOUR: Performed by: SURGERY

## 2017-06-16 PROCEDURE — 63600175 PHARM REV CODE 636 W HCPCS: Performed by: ANESTHESIOLOGY

## 2017-06-16 RX ORDER — ONDANSETRON 8 MG/1
8 TABLET, ORALLY DISINTEGRATING ORAL EVERY 8 HOURS PRN
Status: DISCONTINUED | OUTPATIENT
Start: 2017-06-16 | End: 2017-06-16

## 2017-06-16 RX ORDER — OXYCODONE AND ACETAMINOPHEN 10; 325 MG/1; MG/1
1 TABLET ORAL EVERY 4 HOURS PRN
Status: DISCONTINUED | OUTPATIENT
Start: 2017-06-16 | End: 2017-06-16

## 2017-06-16 RX ORDER — MIDAZOLAM HYDROCHLORIDE 1 MG/ML
INJECTION, SOLUTION INTRAMUSCULAR; INTRAVENOUS
Status: DISCONTINUED | OUTPATIENT
Start: 2017-06-16 | End: 2017-06-16

## 2017-06-16 RX ORDER — SUCCINYLCHOLINE CHLORIDE 20 MG/ML
INJECTION INTRAMUSCULAR; INTRAVENOUS
Status: DISCONTINUED | OUTPATIENT
Start: 2017-06-16 | End: 2017-06-16

## 2017-06-16 RX ORDER — CEFAZOLIN SODIUM 2 G/50ML
2 SOLUTION INTRAVENOUS
Status: COMPLETED | OUTPATIENT
Start: 2017-06-16 | End: 2017-06-16

## 2017-06-16 RX ORDER — LIDOCAINE HYDROCHLORIDE 10 MG/ML
1 INJECTION, SOLUTION EPIDURAL; INFILTRATION; INTRACAUDAL; PERINEURAL ONCE
Status: DISCONTINUED | OUTPATIENT
Start: 2017-06-16 | End: 2017-06-16

## 2017-06-16 RX ORDER — OXYCODONE AND ACETAMINOPHEN 10; 325 MG/1; MG/1
1 TABLET ORAL EVERY 4 HOURS PRN
Status: DISCONTINUED | OUTPATIENT
Start: 2017-06-16 | End: 2017-06-18 | Stop reason: HOSPADM

## 2017-06-16 RX ORDER — SODIUM CHLORIDE 9 MG/ML
3 INJECTION, SOLUTION INTRAMUSCULAR; INTRAVENOUS; SUBCUTANEOUS
Status: DISCONTINUED | OUTPATIENT
Start: 2017-06-16 | End: 2017-06-16 | Stop reason: HOSPADM

## 2017-06-16 RX ORDER — SODIUM CHLORIDE, SODIUM LACTATE, POTASSIUM CHLORIDE, CALCIUM CHLORIDE 600; 310; 30; 20 MG/100ML; MG/100ML; MG/100ML; MG/100ML
INJECTION, SOLUTION INTRAVENOUS CONTINUOUS
Status: DISCONTINUED | OUTPATIENT
Start: 2017-06-16 | End: 2017-06-16

## 2017-06-16 RX ORDER — POTASSIUM CHLORIDE 20 MEQ/1
20 TABLET, EXTENDED RELEASE ORAL DAILY
Status: DISCONTINUED | OUTPATIENT
Start: 2017-06-16 | End: 2017-06-18 | Stop reason: HOSPADM

## 2017-06-16 RX ORDER — ROCURONIUM BROMIDE 10 MG/ML
INJECTION, SOLUTION INTRAVENOUS
Status: DISCONTINUED | OUTPATIENT
Start: 2017-06-16 | End: 2017-06-16

## 2017-06-16 RX ORDER — PROPOFOL 10 MG/ML
VIAL (ML) INTRAVENOUS
Status: DISCONTINUED | OUTPATIENT
Start: 2017-06-16 | End: 2017-06-16

## 2017-06-16 RX ORDER — GLYCOPYRROLATE 0.2 MG/ML
INJECTION INTRAMUSCULAR; INTRAVENOUS
Status: DISCONTINUED | OUTPATIENT
Start: 2017-06-16 | End: 2017-06-16

## 2017-06-16 RX ORDER — SODIUM CHLORIDE, SODIUM LACTATE, POTASSIUM CHLORIDE, CALCIUM CHLORIDE 600; 310; 30; 20 MG/100ML; MG/100ML; MG/100ML; MG/100ML
INJECTION, SOLUTION INTRAVENOUS CONTINUOUS
Status: DISCONTINUED | OUTPATIENT
Start: 2017-06-16 | End: 2017-06-18 | Stop reason: HOSPADM

## 2017-06-16 RX ORDER — ACETAMINOPHEN 10 MG/ML
1000 INJECTION, SOLUTION INTRAVENOUS ONCE
Status: DISCONTINUED | OUTPATIENT
Start: 2017-06-16 | End: 2017-06-16 | Stop reason: HOSPADM

## 2017-06-16 RX ORDER — FUROSEMIDE 20 MG/1
20 TABLET ORAL DAILY
Status: DISCONTINUED | OUTPATIENT
Start: 2017-06-16 | End: 2017-06-18 | Stop reason: HOSPADM

## 2017-06-16 RX ORDER — ONDANSETRON 2 MG/ML
INJECTION INTRAMUSCULAR; INTRAVENOUS
Status: DISCONTINUED | OUTPATIENT
Start: 2017-06-16 | End: 2017-06-16

## 2017-06-16 RX ORDER — LETROZOLE 2.5 MG/1
2.5 TABLET, FILM COATED ORAL DAILY
Status: DISCONTINUED | OUTPATIENT
Start: 2017-06-16 | End: 2017-06-18 | Stop reason: HOSPADM

## 2017-06-16 RX ORDER — SODIUM CHLORIDE 9 MG/ML
3 INJECTION, SOLUTION INTRAMUSCULAR; INTRAVENOUS; SUBCUTANEOUS EVERY 8 HOURS
Status: DISCONTINUED | OUTPATIENT
Start: 2017-06-16 | End: 2017-06-16 | Stop reason: HOSPADM

## 2017-06-16 RX ORDER — HYDROMORPHONE HYDROCHLORIDE 2 MG/ML
1 INJECTION, SOLUTION INTRAMUSCULAR; INTRAVENOUS; SUBCUTANEOUS EVERY 4 HOURS PRN
Status: DISCONTINUED | OUTPATIENT
Start: 2017-06-16 | End: 2017-06-18 | Stop reason: HOSPADM

## 2017-06-16 RX ORDER — ALBUTEROL SULFATE 2.5 MG/.5ML
2.5 SOLUTION RESPIRATORY (INHALATION) EVERY 6 HOURS PRN
Status: DISCONTINUED | OUTPATIENT
Start: 2017-06-16 | End: 2017-06-18 | Stop reason: HOSPADM

## 2017-06-16 RX ORDER — MEPERIDINE HYDROCHLORIDE 50 MG/ML
12.5 INJECTION INTRAMUSCULAR; INTRAVENOUS; SUBCUTANEOUS ONCE AS NEEDED
Status: DISCONTINUED | OUTPATIENT
Start: 2017-06-16 | End: 2017-06-16 | Stop reason: HOSPADM

## 2017-06-16 RX ORDER — FENTANYL 50 UG/1
1 PATCH TRANSDERMAL
Status: DISCONTINUED | OUTPATIENT
Start: 2017-06-16 | End: 2017-06-18 | Stop reason: HOSPADM

## 2017-06-16 RX ORDER — LIDOCAINE HYDROCHLORIDE 20 MG/ML
INJECTION, SOLUTION INFILTRATION; PERINEURAL
Status: DISCONTINUED | OUTPATIENT
Start: 2017-06-16 | End: 2017-06-16

## 2017-06-16 RX ORDER — CITALOPRAM 20 MG/1
20 TABLET, FILM COATED ORAL DAILY
Status: DISCONTINUED | OUTPATIENT
Start: 2017-06-16 | End: 2017-06-18 | Stop reason: HOSPADM

## 2017-06-16 RX ORDER — BUPIVACAINE HYDROCHLORIDE 2.5 MG/ML
INJECTION, SOLUTION EPIDURAL; INFILTRATION; INTRACAUDAL
Status: DISCONTINUED | OUTPATIENT
Start: 2017-06-16 | End: 2017-06-16 | Stop reason: HOSPADM

## 2017-06-16 RX ORDER — ACETAMINOPHEN 10 MG/ML
1000 INJECTION, SOLUTION INTRAVENOUS EVERY 8 HOURS
Status: COMPLETED | OUTPATIENT
Start: 2017-06-16 | End: 2017-06-17

## 2017-06-16 RX ORDER — HYDROMORPHONE HYDROCHLORIDE 2 MG/ML
0.2 INJECTION, SOLUTION INTRAMUSCULAR; INTRAVENOUS; SUBCUTANEOUS EVERY 5 MIN PRN
Status: DISCONTINUED | OUTPATIENT
Start: 2017-06-16 | End: 2017-06-16 | Stop reason: HOSPADM

## 2017-06-16 RX ORDER — MINERAL OIL
OIL (ML) MISCELLANEOUS
Status: DISCONTINUED | OUTPATIENT
Start: 2017-06-16 | End: 2017-06-16 | Stop reason: HOSPADM

## 2017-06-16 RX ORDER — NEOSTIGMINE METHYLSULFATE 1 MG/ML
INJECTION, SOLUTION INTRAVENOUS
Status: DISCONTINUED | OUTPATIENT
Start: 2017-06-16 | End: 2017-06-16

## 2017-06-16 RX ORDER — ALBUTEROL SULFATE 90 UG/1
2 AEROSOL, METERED RESPIRATORY (INHALATION) EVERY 6 HOURS PRN
Status: DISCONTINUED | OUTPATIENT
Start: 2017-06-16 | End: 2017-06-16 | Stop reason: RX

## 2017-06-16 RX ORDER — FENTANYL CITRATE 50 UG/ML
INJECTION, SOLUTION INTRAMUSCULAR; INTRAVENOUS
Status: DISCONTINUED | OUTPATIENT
Start: 2017-06-16 | End: 2017-06-16

## 2017-06-16 RX ADMIN — PROPOFOL 50 MG: 10 INJECTION, EMULSION INTRAVENOUS at 08:06

## 2017-06-16 RX ADMIN — FENTANYL CITRATE 100 MCG: 50 INJECTION, SOLUTION INTRAMUSCULAR; INTRAVENOUS at 07:06

## 2017-06-16 RX ADMIN — ACETAMINOPHEN 1000 MG: 10 INJECTION, SOLUTION INTRAVENOUS at 01:06

## 2017-06-16 RX ADMIN — SODIUM CHLORIDE, SODIUM LACTATE, POTASSIUM CHLORIDE, AND CALCIUM CHLORIDE: 600; 310; 30; 20 INJECTION, SOLUTION INTRAVENOUS at 06:06

## 2017-06-16 RX ADMIN — FENTANYL CITRATE 25 MCG: 50 INJECTION, SOLUTION INTRAMUSCULAR; INTRAVENOUS at 09:06

## 2017-06-16 RX ADMIN — NEOSTIGMINE METHYLSULFATE 2 MG: 1 INJECTION INTRAVENOUS at 08:06

## 2017-06-16 RX ADMIN — BUPIVACAINE HYDROCHLORIDE 30 ML: 2.5 INJECTION, SOLUTION EPIDURAL; INFILTRATION; INTRACAUDAL; PERINEURAL at 08:06

## 2017-06-16 RX ADMIN — NEOMYCIN AND POLYMYXIN B SULFATES AND BACITRACIN ZINC 5 PACKET: 400; 3.5; 5 OINTMENT TOPICAL at 08:06

## 2017-06-16 RX ADMIN — ROCURONIUM BROMIDE 10 MG: 10 INJECTION, SOLUTION INTRAVENOUS at 08:06

## 2017-06-16 RX ADMIN — FENTANYL CITRATE 25 MCG: 50 INJECTION, SOLUTION INTRAMUSCULAR; INTRAVENOUS at 08:06

## 2017-06-16 RX ADMIN — MIDAZOLAM HYDROCHLORIDE 2 MG: 1 INJECTION, SOLUTION INTRAMUSCULAR; INTRAVENOUS at 06:06

## 2017-06-16 RX ADMIN — HYDROMORPHONE HYDROCHLORIDE 0.2 MG: 2 INJECTION, SOLUTION INTRAMUSCULAR; INTRAVENOUS; SUBCUTANEOUS at 10:06

## 2017-06-16 RX ADMIN — LIDOCAINE HYDROCHLORIDE 40 MG: 20 INJECTION, SOLUTION INFILTRATION; PERINEURAL at 07:06

## 2017-06-16 RX ADMIN — GLYCOPYRROLATE 0.2 MG: 0.2 INJECTION, SOLUTION INTRAMUSCULAR; INTRAVENOUS at 08:06

## 2017-06-16 RX ADMIN — FUROSEMIDE 20 MG: 20 TABLET ORAL at 11:06

## 2017-06-16 RX ADMIN — BUPIVACAINE 266 MG: 13.3 INJECTION, SUSPENSION, LIPOSOMAL INFILTRATION at 08:06

## 2017-06-16 RX ADMIN — HYDROMORPHONE HYDROCHLORIDE 1 MG: 2 INJECTION, SOLUTION INTRAMUSCULAR; INTRAVENOUS; SUBCUTANEOUS at 09:06

## 2017-06-16 RX ADMIN — FENTANYL 1 PATCH: 50 PATCH TRANSDERMAL at 11:06

## 2017-06-16 RX ADMIN — CEFAZOLIN SODIUM 2 G: 2 SOLUTION INTRAVENOUS at 07:06

## 2017-06-16 RX ADMIN — HYDROMORPHONE HYDROCHLORIDE 1 MG: 2 INJECTION, SOLUTION INTRAMUSCULAR; INTRAVENOUS; SUBCUTANEOUS at 05:06

## 2017-06-16 RX ADMIN — ROCURONIUM BROMIDE 5 MG: 10 INJECTION, SOLUTION INTRAVENOUS at 07:06

## 2017-06-16 RX ADMIN — LETROZOLE 2.5 MG: 2.5 TABLET, FILM COATED ORAL at 01:06

## 2017-06-16 RX ADMIN — ROCURONIUM BROMIDE 10 MG: 10 INJECTION, SOLUTION INTRAVENOUS at 07:06

## 2017-06-16 RX ADMIN — SUCCINYLCHOLINE CHLORIDE 120 MG: 20 INJECTION, SOLUTION INTRAMUSCULAR; INTRAVENOUS at 07:06

## 2017-06-16 RX ADMIN — ROCURONIUM BROMIDE 20 MG: 10 INJECTION, SOLUTION INTRAVENOUS at 07:06

## 2017-06-16 RX ADMIN — SODIUM CHLORIDE, SODIUM LACTATE, POTASSIUM CHLORIDE, AND CALCIUM CHLORIDE: .6; .31; .03; .02 INJECTION, SOLUTION INTRAVENOUS at 11:06

## 2017-06-16 RX ADMIN — PROPOFOL 150 MG: 10 INJECTION, EMULSION INTRAVENOUS at 07:06

## 2017-06-16 RX ADMIN — ACETAMINOPHEN 1000 MG: 10 INJECTION, SOLUTION INTRAVENOUS at 09:06

## 2017-06-16 RX ADMIN — CITALOPRAM HYDROBROMIDE 20 MG: 20 TABLET ORAL at 11:06

## 2017-06-16 RX ADMIN — POTASSIUM CHLORIDE 20 MEQ: 1500 TABLET, EXTENDED RELEASE ORAL at 11:06

## 2017-06-16 RX ADMIN — ONDANSETRON 4 MG: 2 INJECTION, SOLUTION INTRAMUSCULAR; INTRAVENOUS at 08:06

## 2017-06-16 RX ADMIN — FENTANYL CITRATE 50 MCG: 50 INJECTION, SOLUTION INTRAMUSCULAR; INTRAVENOUS at 07:06

## 2017-06-16 RX ADMIN — Medication 20 ML: at 08:06

## 2017-06-16 NOTE — PLAN OF CARE
Problem: Patient Care Overview  Goal: Plan of Care Review  Outcome: Ongoing (interventions implemented as appropriate)  Pt remains free from falls and injuries this shift bed lock call light within reach. Pain managed by prn pain med as ordered. Dressing to bilateral axilla clean, dry and intact. POC reviewed with pt verbalizes understanding. Spouse at bedside. 12 hr chart check complete.

## 2017-06-16 NOTE — OP NOTE
[facility]  Surgery Department  Operative Note    SUMMARY     Date of Procedure: 6/16/2017     Procedure: Procedure(s) (LRB):  SKIN GRAFT-SPLIT THICKNESS (Bilateral)  INCISION AND DRAINAGE (I & D)-GROIN (Left)  INCISION AND DRAINAGE-THIGH (Right)     Surgeon(s) and Role:     * Rodger Wahl MD - Primary    Assisting Surgeon: None    Pre-Operative Diagnosis: Hidradenitis axillaris [L73.2]    Post-Operative Diagnosis: Post-Op Diagnosis Codes:     * Hidradenitis axillaris [L73.2]    Anesthesia: General    Technical Procedures Used: Split thickness skin graft, 8 x 8 cm total  Drainage of 2 abscesses    Description of the Findings of the Procedure:     Patient was brought into the into the operating room and placed on the operating table in supine position.  Gen. endotracheal anesthesia was induced.  The dressings were removed from the axilla bilaterally and the left groin.  The bilateral axilla, left groin and thigh and right medial thigh were prepped and draped in the standard fashion.  IV any marks had been administered.  Pneumatic compression devices were on the lower extremities.    A timeout was performed.    The open wounds of the axilla were measured and found to be 8 x 4 cm.  Split-thickness skin was harvested of the skin from the left lateral thigh was accomplished with the dermatome.  The skin was harvested into 4 x 8 cm strips.  These were then meshed 1-1.5.    The split-thickness skin graft sites were then prepared by scraping the granulation tissue with a scalpel blade.  The harvested skin was laid over the skin graft site and secured with interrupted 3-0 Vicryl sutures.    The harvest site was infiltrated with a mixture of aspirin or Marcaine.  Hemostasis was achieved.  Xeroform was placed over the donor site and secured with staples.      Once the skin grafts were secured there were covered with Adaptic and antibiotic ointment.  They were then covered with the black foam of the wound  VAC and secured.  The tears were obtained.      The left groin and right thigh abscesses were drained by making a skin incision over the abscess with electrocautery.    Curettes drained.  The wounds were then packed with gauze.  The donor site was covered with Adaptic and an abdominal pad and secured with tape.    Significant Surgical Tasks Conducted by the Assistant(s), if Applicable: None    Complications: No    Estimated Blood Loss (EBL): *25 mL's  Marcaine 0.25% 30 ML's  X Abreu 266 mg           Implants: * No implants in log *    Specimens:   Specimen (12h ago through future)    None                  Condition: Stable    Disposition: PACU - hemodynamically stable.    Attestation: I performed the procedure.

## 2017-06-16 NOTE — TRANSFER OF CARE
"Anesthesia Transfer of Care Note    Patient: Josey Flores    Procedure(s) Performed: Procedure(s) (LRB):  SKIN GRAFT-SPLIT THICKNESS (Bilateral)  INCISION AND DRAINAGE (I & D)-GROIN (Left)  INCISION AND DRAINAGE-THIGH (Right)    Patient location: PACU    Anesthesia Type: general    Transport from OR: Transported from OR on room air with adequate spontaneous ventilation    Post pain: adequate analgesia    Post assessment: no apparent anesthetic complications    Post vital signs: stable    Level of consciousness: awake and alert    Nausea/Vomiting: no nausea/vomiting    Complications: none    Transfer of care protocol was followed      Last vitals:   Visit Vitals  BP (!) 113/56 (BP Location: Right arm, Patient Position: Sitting, BP Method: Automatic)   Pulse 78   Temp 36.8 °C (98.2 °F) (Skin)   Resp 20   Ht 5' 7" (1.702 m)   Wt 94.5 kg (208 lb 5.4 oz)   LMP  (LMP Unknown)   SpO2 98%   Breastfeeding? No   BMI 32.63 kg/m²     "

## 2017-06-16 NOTE — DISCHARGE INSTRUCTIONS
General Information:    1.  Do not drink alcoholic beverages including beer for 24 hours or as long as you are on pain medication..  2.  Do not drive a motor vehicle, operate machinery or power tools, or signs legal papers for 24 hours or as long as you are on pain medication.   3.  You may experience light-headedness, dizziness, and sleepiness following surgery. Please do not stay alone. A responsible adult should be with you for this 24 hour period.  4.  Go home and rest.    5. Progress slowly to a normal diet unless instructed.  Otherwise, begin with liquids such as soft drinks, then soup and crackers working up to solid foods. Drink plenty of nonalcoholic fluids.  6.  Certain anesthetics and pain medications produce nausea and vomiting in certain       individuals. If nausea becomes a problem at home, call you doctor.    7. A nurse will be calling you sometime after surgery. Do not be alarmed. This is our way of finding out how you are doing.    8. Several times every hour while you are awake, take 2-3 deep breaths and cough. If you had stomach surgery hold a pillow or rolled towel firmly against your stomach before you cough. This will help with any pain the cough might cause.  9. Several times every hour while you are awake, pump and flex your feet 5-6 times and do foot circles. This will help prevent blood clots.    10.Call your doctor for severe pain, bleeding, fever, or signs or symptoms of infection (pain, swelling, redness, foul odor, drainage).    11.You can contact your doctor anytime by [phone] for the [facility] (at [facility]) or [phone] for the [facility] on [facility].   [email or web address] is another way to contact your doctor if you are an active participant online with [facility].        Discharge Instructions for Your Split Thickness Skin Graft Site  A skin graft is healthy skin that is used to replace damaged or missing skin. The graft is taken from another part  of your body. This is called the donor site. You will need to care for both the graft and donor sites as instructed so they heal properly. Follow instructions carefully. It will take 2 to 4 weeks or longer for the graft to completely heal. This varies from person to person and may depend on the size of the graft.   Your bandages  Your skin graft will have a bandage (dressing). Underneath the graft bandage you may have a bolster. This is a padded covering secured to the surrounding skin with stitches. The bolster holds the skin graft in place. Or you may have a vacuum bandage, also called negative pressure dressing. This bandage has a thin, flexible tube attached to a small machine. The machine removes air from under the bandage. This can reduce swelling and speed healing. Your bandage will be first changed after 4 to 7 days. The donor site will have a thin bandage. You will not have a bolster or vacuum bandage on the donor site.  General home care  · Plan to rest at home for up to a week after the surgery.  · Expect some light bleeding, swelling, bruising, redness, and discomfort.  · If you were given prescription pain medicine, take it as instructed.  · Follow any other instructions you were given.  Caring for the bandaged graft site  · Do not touch the bandage. Leave it in place until you are told to remove or change it.  · Keep the bandage dry. Take a sponge bath to avoid getting your bandage wet, unless your healthcare provider tells you otherwise. Ask your provider about the best way to keep your bandage dry when bathing or showering. Ask your provider what to do if your bandage gets wet.  · Keep the bandaged area clean. Avoid getting dirt or sweat on it.  · If the bandage comes off or is damaged or very dirty, call your healthcare provider.  · If the tube on your vacuum bandage comes off, call your healthcare provider.  · As often as possible, elevate the graft site above the level of your heart. Do this  when sitting or lying down. This helps reduce swelling and fluid buildup in the graft area.  · Keep the part of the body with the graft as still as possible. Avoid any movement that stretches or pulls the skin graft.  · If the graft bleeds, apply gentle but firm pressure to the graft site with a clean cloth or bandage for 10 minutes.  Caring for the bandaged donor site  · The donor site will have a thin bandage. Do not touch the bandage. Leave it in place until you are told to remove or change it.  · Fluid will leak from this area. This is normal.  · If the site becomes swollen or is hot or painful, call your healthcare provider.  · Your bandage will be removed 7 to 10 days after surgery.  · After the bandage is removed, the site will be pink. Over time it will return to more normal color.   Activity  You can increase your activity a little bit each day. While you are recovering:  · Do not do any activity that stretches or moves the skin graft for as long as advised by your health care provider.  · Do not drive while you are taking prescription pain medicine.  · Ask others for help with chores and errands.  · Bathe or shower according to your healthcare providers instructions.  · Ask your healthcare provider when you can return to work.  Follow-up  During your follow-up visits, your doctor will check how youre healing. If you have sutures, they may be removed 1 to 2 weeks after surgery. Your graft site bandage will be changed 4 days to 7 days after surgery. It will be removed 2 to 3 weeks after surgery. You may have smaller bandages over time as the site heals. Your graft site may have areas that turn dark blue or black. This means that this part of the graft tissue has . It is common for this to happen in small areas. Your healthcare provider will tell you how to care for this area if needed.  After your bandages are removed  After the bandage is removed, the skin graft may look crusted and discolored. This  is normal. The skin graft will change color over time. It may look very red for 2 to 3 months. During this time:  · Do not scratch, pick at, or touch the graft site or donor site.  · Keep the skin moist in these areas. Apply nonmedicated skin lotion often during the day. Do this for 3 to 4 months or as advised.  · Do not soak the skin graft site in water. Ask your healthcare provider about the best way to keep the skin graft dry when showering for 1 to 2 weeks. Do not take baths for 2 to 3 weeks.  · For 3 to 4 weeks, avoid any exercise or movement that stretches the skin graft.  · Protect the skin graft and donor site from the sun for 12 months. Wear clothing over them or use a sunscreen lotion with an SPF of 30 or higher.  · Do not rub, scratch, or injure the graft site in any way.  · Do not pick at scabs. They help protect the wound and help in healing.  · Follow all other instructions from your health care provider.  When to call the healthcare provider  Call your doctor right away if you have any of the following:  · Fever of 100.4°F (38°C) or higher  · Pain that gets worse or doesnt go away  · Bleeding of the graft that cant be stopped by applying pressure  · Signs of infection, including increasing swelling or redness of the graft, white or bad-smelling discharge from the graft, red streaks from the graft site, or pus at the wound site  · Edges of the graft site that start to open up  · Any other signs or symptoms indicated by your healthcare provider   Date Last Reviewed: 7/2/2015  © 5058-6238 [employer]. [address] [place] [address], [place] [address]. All rights reserved. This information is not intended as a substitute for professional medical care. Always follow your healthcare professional's instructions.

## 2017-06-16 NOTE — ANESTHESIA POSTPROCEDURE EVALUATION
"Anesthesia Post Evaluation    Patient: Josey Flores    Procedure(s) Performed: Procedure(s) (LRB):  SKIN GRAFT-SPLIT THICKNESS (Bilateral)  INCISION AND DRAINAGE (I & D)-GROIN (Left)  INCISION AND DRAINAGE-THIGH (Right)    Final Anesthesia Type: general  Patient location during evaluation: PACU  Patient participation: Yes- Able to Participate  Level of consciousness: awake and alert  Post-procedure vital signs: reviewed and not stable  Pain management: adequate  Airway patency: patent  PONV status at discharge: No PONV  Anesthetic complications: no      Cardiovascular status: stable  Respiratory status: unassisted  Hydration status: euvolemic  Follow-up not needed.        Visit Vitals  /67 (BP Location: Right leg, Patient Position: Lying, BP Method: Automatic)   Pulse 78   Temp 36.6 °C (97.8 °F) (Temporal)   Resp 20   Ht 5' 7" (1.702 m)   Wt 94.5 kg (208 lb 5.4 oz)   LMP  (LMP Unknown)   SpO2 96%   Breastfeeding? No   BMI 32.63 kg/m²       Pain/Shraddha Score: Pain Assessment Performed: Yes (6/16/2017  9:25 AM)  Presence of Pain: complains of pain/discomfort (6/16/2017 10:58 AM)  Pain Rating Prior to Med Admin: 8 (6/16/2017 11:40 AM)  Shraddha Score: 8 (6/16/2017 10:58 AM)      "

## 2017-06-16 NOTE — INTERVAL H&P NOTE
The patient has been examined and the H&P has been reviewed:    I concur with the findings and changes have been noted since the H&P was written: right thigh abscess    Anesthesia/Surgery risks, benefits and alternative options discussed and understood by patient/family.          Active Hospital Problems    Diagnosis  POA    Open wound [T14.8]  Yes      Resolved Hospital Problems    Diagnosis Date Resolved POA   No resolved problems to display.

## 2017-06-16 NOTE — ANESTHESIA RELEASE NOTE
"Anesthesia Release from PACU Note    Patient: Josey Flores    Procedure(s) Performed: Procedure(s) (LRB):  SKIN GRAFT-SPLIT THICKNESS (Bilateral)  INCISION AND DRAINAGE (I & D)-GROIN (Left)  INCISION AND DRAINAGE-THIGH (Right)    Anesthesia type: general    Post pain: Adequate analgesia    Post assessment: no apparent anesthetic complications    Last Vitals:   Visit Vitals  BP (!) 113/56 (BP Location: Right arm, Patient Position: Sitting, BP Method: Automatic)   Pulse 78   Temp 36.8 °C (98.2 °F) (Skin)   Resp 20   Ht 5' 7" (1.702 m)   Wt 94.5 kg (208 lb 5.4 oz)   LMP  (LMP Unknown)   SpO2 98%   Breastfeeding? No   BMI 32.63 kg/m²       Post vital signs: stable    Level of consciousness: awake and alert     Nausea/Vomiting: no nausea/no vomiting    Complications: none    Airway Patency: patent    Respiratory: unassisted, spontaneous ventilation, face mask    Cardiovascular: stable and blood pressure at baseline    Hydration: euvolemic  "

## 2017-06-17 LAB
BACTERIA BLD CULT: NORMAL
BACTERIA BLD CULT: NORMAL

## 2017-06-17 PROCEDURE — 99024 POSTOP FOLLOW-UP VISIT: CPT | Mod: ,,, | Performed by: SURGERY

## 2017-06-17 PROCEDURE — 25000003 PHARM REV CODE 250: Performed by: SURGERY

## 2017-06-17 PROCEDURE — 63600175 PHARM REV CODE 636 W HCPCS: Performed by: SURGERY

## 2017-06-17 RX ADMIN — HYDROMORPHONE HYDROCHLORIDE 1 MG: 2 INJECTION, SOLUTION INTRAMUSCULAR; INTRAVENOUS; SUBCUTANEOUS at 01:06

## 2017-06-17 RX ADMIN — HYDROMORPHONE HYDROCHLORIDE 1 MG: 2 INJECTION, SOLUTION INTRAMUSCULAR; INTRAVENOUS; SUBCUTANEOUS at 05:06

## 2017-06-17 RX ADMIN — BACITRACIN ZINC, NEOMYCIN SULFATE, POLYMYXIN B SULFATE 1 EACH: 3.5; 5000; 4 OINTMENT TOPICAL at 09:06

## 2017-06-17 RX ADMIN — FUROSEMIDE 20 MG: 20 TABLET ORAL at 09:06

## 2017-06-17 RX ADMIN — ACETAMINOPHEN 1000 MG: 10 INJECTION, SOLUTION INTRAVENOUS at 05:06

## 2017-06-17 RX ADMIN — HYDROMORPHONE HYDROCHLORIDE 1 MG: 2 INJECTION, SOLUTION INTRAMUSCULAR; INTRAVENOUS; SUBCUTANEOUS at 07:06

## 2017-06-17 RX ADMIN — HYDROMORPHONE HYDROCHLORIDE 1 MG: 2 INJECTION, SOLUTION INTRAMUSCULAR; INTRAVENOUS; SUBCUTANEOUS at 02:06

## 2017-06-17 RX ADMIN — CITALOPRAM HYDROBROMIDE 20 MG: 20 TABLET ORAL at 09:06

## 2017-06-17 RX ADMIN — POTASSIUM CHLORIDE 20 MEQ: 1500 TABLET, EXTENDED RELEASE ORAL at 09:06

## 2017-06-17 RX ADMIN — SODIUM CHLORIDE, SODIUM LACTATE, POTASSIUM CHLORIDE, AND CALCIUM CHLORIDE: .6; .31; .03; .02 INJECTION, SOLUTION INTRAVENOUS at 01:06

## 2017-06-17 RX ADMIN — BACITRACIN ZINC, NEOMYCIN SULFATE, POLYMYXIN B SULFATE 1 EACH: 3.5; 5000; 4 OINTMENT TOPICAL at 01:06

## 2017-06-17 RX ADMIN — HYDROMORPHONE HYDROCHLORIDE 1 MG: 2 INJECTION, SOLUTION INTRAMUSCULAR; INTRAVENOUS; SUBCUTANEOUS at 10:06

## 2017-06-17 RX ADMIN — LETROZOLE 2.5 MG: 2.5 TABLET, FILM COATED ORAL at 09:06

## 2017-06-17 NOTE — PLAN OF CARE
Problem: Patient Care Overview  Goal: Plan of Care Review  Outcome: Ongoing (interventions implemented as appropriate)  POC reviewed with pt, pt verbalized understanding. AAO x 4, VSS. Pt tolerating regular diet, denies nausea. Pt verbalizes satisfaction with PRN pain medication. Pt remains free of falls/injuries at this time. 12 hour chart check completed. TM

## 2017-06-17 NOTE — PLAN OF CARE
Problem: Patient Care Overview  Goal: Plan of Care Review  Outcome: Ongoing (interventions implemented as appropriate)  Remains free from harm, pain controlled via IV pain meds, no acute distress noted. 24 hour chart check complete.

## 2017-06-17 NOTE — PROGRESS NOTES
The patient was seen and examined. No particular complaints. Doing well.   Wound vac intact.    Messi Davies

## 2017-06-18 VITALS
DIASTOLIC BLOOD PRESSURE: 73 MMHG | TEMPERATURE: 98 F | OXYGEN SATURATION: 96 % | HEART RATE: 70 BPM | BODY MASS INDEX: 32.7 KG/M2 | WEIGHT: 208.31 LBS | HEIGHT: 67 IN | SYSTOLIC BLOOD PRESSURE: 138 MMHG | RESPIRATION RATE: 16 BRPM

## 2017-06-18 PROCEDURE — 25000003 PHARM REV CODE 250: Performed by: SURGERY

## 2017-06-18 PROCEDURE — 99024 POSTOP FOLLOW-UP VISIT: CPT | Mod: ,,, | Performed by: SURGERY

## 2017-06-18 RX ORDER — OXYCODONE AND ACETAMINOPHEN 10; 325 MG/1; MG/1
1 TABLET ORAL EVERY 4 HOURS PRN
Qty: 30 TABLET | Refills: 0 | Status: SHIPPED | OUTPATIENT
Start: 2017-06-18 | End: 2017-06-28 | Stop reason: ALTCHOICE

## 2017-06-18 RX ADMIN — POTASSIUM CHLORIDE 20 MEQ: 1500 TABLET, EXTENDED RELEASE ORAL at 09:06

## 2017-06-18 RX ADMIN — SODIUM CHLORIDE, SODIUM LACTATE, POTASSIUM CHLORIDE, AND CALCIUM CHLORIDE: .6; .31; .03; .02 INJECTION, SOLUTION INTRAVENOUS at 03:06

## 2017-06-18 RX ADMIN — OXYCODONE HYDROCHLORIDE AND ACETAMINOPHEN 1 TABLET: 10; 325 TABLET ORAL at 08:06

## 2017-06-18 RX ADMIN — CITALOPRAM HYDROBROMIDE 20 MG: 20 TABLET ORAL at 08:06

## 2017-06-18 RX ADMIN — FUROSEMIDE 20 MG: 20 TABLET ORAL at 08:06

## 2017-06-18 RX ADMIN — LETROZOLE 2.5 MG: 2.5 TABLET, FILM COATED ORAL at 09:06

## 2017-06-18 RX ADMIN — BACITRACIN ZINC, NEOMYCIN SULFATE, POLYMYXIN B SULFATE 1 EACH: 3.5; 5000; 4 OINTMENT TOPICAL at 09:06

## 2017-06-18 NOTE — DISCHARGE SUMMARY
[facility] -   General Surgery  Discharge Summary      Patient Name: Josey Flores  MRN: [id number]  Admission Date: 6/16/2017  Hospital Length of Stay: 0 days  Discharge Date and Time:  06/18/2017 9:02 AM  Attending Physician: Rodger Wahl MD   Discharging Provider: Messi Davies MD  Primary Care Provider: Aileen Sadler MD    HPI:   Admitted for skin grafting of the open wound as result of Hidradenitis suppurativa.    Procedure(s) (LRB):  SKIN GRAFT-SPLIT THICKNESS (Bilateral)  INCISION AND DRAINAGE (I & D)-GROIN (Left)  INCISION AND DRAINAGE-THIGH (Right)      Indwelling Lines/Drains at time of discharge:   Lines/Drains/Airways     Drain                 Closed/Suction Drain 05/19/17 1400 Left  Other (Comment) 29 days         Closed/Suction Drain 05/19/17 1400 Left Groin Other (Comment) 29 days         Closed/Suction Drain 05/19/17 1400 Right Other (Comment) Other (Comment) 29 days         Closed/Suction Drain 06/16/17 0820 Left Other (Comment) 2 days         Closed/Suction Drain 06/16/17 0820 Right Other (Comment) 2 days              Hospital Course: Uneventful.      Consults:     Significant Diagnostic Studies: Labs: BMP: No results for input(s): GLU, NA, K, CL, CO2, BUN, CREATININE, CALCIUM, MG in the last 48 hours. and CBC No results for input(s): WBC, HGB, HCT, PLT in the last 48 hours.    Pending Diagnostic Studies:     None        Final Active Diagnoses:    Diagnosis Date Noted POA    PRINCIPAL PROBLEM:  Open wound [T14.8] 06/16/2017 Yes      Problems Resolved During this Admission:    Diagnosis Date Noted Date Resolved POA      Discharged Condition: good    Disposition:     Follow Up:  Follow-up Information     Rodger Wahl MD. Schedule an appointment as soon as possible for a visit on 6/22/2017.    Specialty:  General Surgery  Why:  For wound re-check  Contact information:  [address]  [place] [address] [phone]                 Patient Instructions:   No  discharge procedures on file.  Medications:  Reconciled Home Medications:   Current Discharge Medication List      START taking these medications    Details   !! oxycodone-acetaminophen (PERCOCET)  mg per tablet Take 1 tablet by mouth every 4 (four) hours as needed for Pain.  Qty: 30 tablet, Refills: 0       !! - Potential duplicate medications found. Please discuss with provider.      CONTINUE these medications which have NOT CHANGED    Details   alprazolam (XANAX) 0.5 MG tablet Take 1 tablet (0.5 mg total) by mouth 2 (two) times daily as needed for Insomnia or Anxiety.  Qty: 60 tablet, Refills: 0    Associated Diagnoses: Malignant neoplasm of overlapping sites of right female breast; Anxiety; Malignant neoplasm metastatic to lymph node of axilla; Secondary cancer of bone; Neoplasm related pain; Depression, unspecified depression type      citalopram (CELEXA) 20 MG tablet Take 1 tablet (20 mg total) by mouth once daily.  Qty: 30 tablet, Refills: 2    Associated Diagnoses: Depression, unspecified depression type; Malignant neoplasm of overlapping sites of right female breast; Malignant neoplasm metastatic to lymph node of axilla; Secondary cancer of bone; Anxiety; Neoplasm related pain      ergocalciferol (VITAMIN D2) 50,000 unit Cap Take 5,000 Units by mouth once daily at 6am.       furosemide (LASIX) 20 MG tablet Take 1 tablet (20 mg total) by mouth once daily.  Qty: 30 tablet, Refills: 1    Associated Diagnoses: Malignant neoplasm of overlapping sites of right female breast; Secondary cancer of bone; Malignant neoplasm metastatic to lymph node of axilla; Hidradenitis      hydrocodone-acetaminophen 7.5-325mg (NORCO) 7.5-325 mg per tablet Take 1 tablet by mouth every 6 (six) hours as needed for Pain.  Qty: 60 tablet, Refills: 0    Associated Diagnoses: Malignant neoplasm of overlapping sites of right female breast; Malignant neoplasm metastatic to lymph node of axilla; Secondary cancer of bone      IBRANCE  125 mg Cap TAKE 1 CAPSULE (125 MG) DAILY FOR 21 DAYS, FOLLOWED BY 7 DAY REST PERIOD TO COMPLETE A 28 DAY TREATMENT CYCLE  Qty: 21 capsule, Refills: 2    Associated Diagnoses: Malignant neoplasm of overlapping sites of right female breast; Malignant neoplasm metastatic to lymph node of axilla; Secondary cancer of bone; Neoplasm related pain      letrozole (FEMARA) 2.5 mg Tab Take 1 tablet (2.5 mg total) by mouth once daily.  Qty: 30 tablet, Refills: 2    Associated Diagnoses: Malignant neoplasm of overlapping sites of right female breast; Malignant neoplasm metastatic to lymph node of axilla; Secondary cancer of bone; Neoplasm related pain; Anxiety; Depression, unspecified depression type      multivitamin (THERAGRAN) per tablet Take 1 tablet by mouth once daily.      ondansetron (ZOFRAN-ODT) 4 MG TbDL Take 1 tablet (4 mg total) by mouth every 6 (six) hours as needed.  Qty: 10 tablet, Refills: 0      !! oxycodone-acetaminophen (PERCOCET)  mg per tablet Take 1 tablet by mouth every 4 (four) hours as needed for Pain.  Qty: 30 tablet, Refills: 0      potassium chloride SA (K-DUR,KLOR-CON) 20 MEQ tablet Take 1 tablet (20 mEq total) by mouth once daily.  Qty: 30 tablet, Refills: 1    Associated Diagnoses: Malignant neoplasm of overlapping sites of right female breast; Secondary cancer of bone; Malignant neoplasm metastatic to lymph node of axilla; Hidradenitis      albuterol 90 mcg/actuation inhaler Inhale 1-2 puffs into the lungs every 6 (six) hours as needed for Wheezing.  Qty: 1 Inhaler, Refills: 0      fentaNYL (DURAGESIC) 50 mcg/hr Place 1 patch onto the skin every 72 hours.  Qty: 10 patch, Refills: 0    Associated Diagnoses: Malignant neoplasm of overlapping sites of right female breast; Secondary cancer of bone; Malignant neoplasm metastatic to lymph node of axilla; Hidradenitis      sulfamethoxazole-trimethoprim 800-160mg (BACTRIM DS) 800-160 mg Tab Take 1 tablet by mouth 2 (two) times daily.  Qty: 14  tablet, Refills: 0       !! - Potential duplicate medications found. Please discuss with provider.        Time spent on the discharge of patient: 10   minutes    Messi Davies MD  General Surgery  [facility]

## 2017-06-18 NOTE — PROGRESS NOTES
Discharge instructions given to pt, pt verbalized understanding. AAO x 4, VSS. IV removed. Bandages changed and would care instructions/supplies given. Pt verbalized understanding. Pt off floor per wheel chair.

## 2017-06-18 NOTE — PLAN OF CARE
Problem: Patient Care Overview  Goal: Plan of Care Review  Outcome: Ongoing (interventions implemented as appropriate)  Room air, respirations even and unlabored, no distress noted on assessment, pain, no nausea or shortness of breath,  at bedside, wound vac bilateral axilla, refused bed alarm

## 2017-06-18 NOTE — PLAN OF CARE
Problem: Patient Care Overview  Goal: Plan of Care Review  Outcome: Outcome(s) achieved Date Met: 06/18/17  POC reviewed with pt, pt verbalized understanding. AAO x 4, VSS. Pt tolerating regular diet, denies nausea. Pt verbalizes satisfaction with PRN pain medication. Pt to be discharged home today. 12 hour chart check completed. [facility]

## 2017-06-19 ENCOUNTER — TELEPHONE (OUTPATIENT)
Dept: HEMATOLOGY/ONCOLOGY | Facility: CLINIC | Age: 47
End: 2017-06-19

## 2017-06-19 NOTE — TELEPHONE ENCOUNTER
----- Message from Margo Burciaga sent at 6/19/2017 11:10 AM CDT -----  Pt needs to speak to the nurse regarding  her pain patch needing to be approved by her insurance/please call [phone]/ma

## 2017-06-20 ENCOUNTER — PATIENT MESSAGE (OUTPATIENT)
Dept: HEMATOLOGY/ONCOLOGY | Facility: CLINIC | Age: 47
End: 2017-06-20

## 2017-06-20 ENCOUNTER — TELEPHONE (OUTPATIENT)
Dept: HEMATOLOGY/ONCOLOGY | Facility: CLINIC | Age: 47
End: 2017-06-20

## 2017-06-20 ENCOUNTER — NURSE TRIAGE (OUTPATIENT)
Dept: ADMINISTRATIVE | Facility: CLINIC | Age: 47
End: 2017-06-20

## 2017-06-20 NOTE — TELEPHONE ENCOUNTER
----- Message from Maritza Rosas sent at 6/20/2017 12:55 PM CDT -----  Patient returned your call.  Call her at [phone].                                                 alcaraz

## 2017-06-20 NOTE — TELEPHONE ENCOUNTER
----- Message from Richard Delong MD sent at 6/19/2017  6:39 AM CDT -----  Please let patient know that all recent lab results including blood culture results look ok. Thank you.

## 2017-06-20 NOTE — TELEPHONE ENCOUNTER
Attempted to contact patient about missed call. No answer. Voicemail left with call back number and message stating I will contact via [facility].

## 2017-06-21 NOTE — TELEPHONE ENCOUNTER
"  Reason for Disposition   Caller has URGENT question and triager unable to answer question    Answer Assessment - Initial Assessment Questions  1. SYMPTOM: "What's the main symptom you're concerned about?" (e.g., redness, pain, drainage)      Wound Vac is not functioning propretly  2. ONSET: "When did ________  start?"      tonight  3. SURGERY: "What surgery was performed?"      Wound grafts  4. DATE of SURGERY: "When was surgery performed?"         5. INCISION SITE: "Where is the incision located?"       Under both arms  6. REDNESS: "Is there any redness at the incision site?" If yes, ask: "How wide across is the redness?" (Inches, centimeters)         7. PAIN: "Is there any pain?" If so, ask: "How bad is it?"  (Scale 1-10; or mild, moderate, severe)        8. BLEEDING: "Is there any bleeding?" If so, ask: "How much?" and "Where?"        9. DRAINAGE: "Is there any drainage from the incision site?" If yes, ask: "What color and how much?" (e.g., red, cloudy, pus; drops, teaspoon)      Wound vac is not working. Spoke with wound Vac company and was advised to take pt to hospital as no one will be able to go out until 8am tomorrrow morning.  10. FEVER: "Do you have a fever?" If so, ask: "What is your temperature, how was it measured, and when did it start?"          11. OTHER SYMPTOMS: "Do you have any other symptoms?" (e.g., shaking chills, weakness, rash elsewhere on body)        None noted    Protocols used: ST POST-OP INCISION SYMPTOMS-A-AH    "

## 2017-06-22 ENCOUNTER — OFFICE VISIT (OUTPATIENT)
Dept: SURGERY | Facility: CLINIC | Age: 47
End: 2017-06-22
Payer: MEDICAID

## 2017-06-22 VITALS
SYSTOLIC BLOOD PRESSURE: 96 MMHG | HEART RATE: 86 BPM | WEIGHT: 211.19 LBS | DIASTOLIC BLOOD PRESSURE: 40 MMHG | TEMPERATURE: 99 F | BODY MASS INDEX: 33.08 KG/M2

## 2017-06-22 DIAGNOSIS — F41.9 ANXIETY: ICD-10-CM

## 2017-06-22 DIAGNOSIS — C79.51 SECONDARY CANCER OF BONE: ICD-10-CM

## 2017-06-22 DIAGNOSIS — G89.3 NEOPLASM RELATED PAIN: ICD-10-CM

## 2017-06-22 DIAGNOSIS — C77.3 MALIGNANT NEOPLASM METASTATIC TO LYMPH NODE OF AXILLA: ICD-10-CM

## 2017-06-22 DIAGNOSIS — C50.811 MALIGNANT NEOPLASM OF OVERLAPPING SITES OF RIGHT FEMALE BREAST: ICD-10-CM

## 2017-06-22 DIAGNOSIS — F32.A DEPRESSION, UNSPECIFIED DEPRESSION TYPE: ICD-10-CM

## 2017-06-22 DIAGNOSIS — Z94.5 STATUS POST SKIN GRAFT: Primary | ICD-10-CM

## 2017-06-22 PROCEDURE — 99213 OFFICE O/P EST LOW 20 MIN: CPT | Mod: PBBFAC,PO | Performed by: SURGERY

## 2017-06-22 PROCEDURE — 99024 POSTOP FOLLOW-UP VISIT: CPT | Mod: ,,, | Performed by: SURGERY

## 2017-06-22 PROCEDURE — 99999 PR PBB SHADOW E&M-EST. PATIENT-LVL III: CPT | Mod: PBBFAC,,, | Performed by: SURGERY

## 2017-06-22 RX ORDER — OXYCODONE AND ACETAMINOPHEN 10; 325 MG/1; MG/1
1 TABLET ORAL EVERY 4 HOURS PRN
Qty: 30 TABLET | Refills: 0 | Status: SHIPPED | OUTPATIENT
Start: 2017-06-22 | End: 2017-07-05

## 2017-06-22 RX ORDER — ALPRAZOLAM 0.5 MG/1
0.5 TABLET ORAL 2 TIMES DAILY PRN
Qty: 60 TABLET | Refills: 0 | Status: SHIPPED | OUTPATIENT
Start: 2017-06-22 | End: 2017-07-05 | Stop reason: SDUPTHER

## 2017-06-22 NOTE — PATIENT INSTRUCTIONS
You no longer need the wound VAC so you can send it back.    Please allow the yellow gauze on your left leg to dry out.  Once it is completely dried out we remove the staples.      To take care of the skin graft sites they need to be covered with antibiotic ointment and a dressing that doesn't stick once or twice a day.  It is okay to shower and get everything wet

## 2017-06-22 NOTE — TELEPHONE ENCOUNTER
----- Message from Rosendo Najera sent at 6/22/2017  2:20 PM CDT -----  Contact: Pt  Pt needs a refill on her pain med and XANAX ..[phone] (home)     .  [facility] [address] [place] [id number]  Phone: [phone] Fax: [phone]

## 2017-06-22 NOTE — PROGRESS NOTES
Subjective:       Patient ID: Josey Flores is a 46 y.o. female.    Skin Graft to the axillas    Chief Complaint: Follow-up (Post-op)    Pain at the skin graft sites from the wound VAC and the donor site      Review of Systems   Skin:        Skin graft site pain as well as donor site pain       Objective:      Physical Exam   Skin:   The donor site was examined.  The Adaptic had been left in place.  It was removed.  The underlying Xeroform was still switched some purulent exudate.  There was no surrounding erythema.  The right and left axillary skin graft sites were examined there is a 95% take.  These were redressed with antibiotic ointment and a nonadherent dressing       Assessment:     status post split-thickness skin graft to the axilla, good take.  Postoperative pain should begin to resolve    Plan:       wound care with antibiotic ointment and a nonadherent dressing to the skin graft sites, allow the donor site to dry out now that the Adaptic is been removed  Discontinue the wound VAC  Follow-up in 10 days for staple removal

## 2017-06-27 ENCOUNTER — HOSPITAL ENCOUNTER (EMERGENCY)
Facility: HOSPITAL | Age: 47
Discharge: HOME | End: 2017-06-27
Attending: EMERGENCY MEDICINE
Payer: MEDICAID

## 2017-06-27 VITALS
HEART RATE: 87 BPM | RESPIRATION RATE: 18 BRPM | TEMPERATURE: 98 F | BODY MASS INDEX: 32.96 KG/M2 | SYSTOLIC BLOOD PRESSURE: 150 MMHG | OXYGEN SATURATION: 95 % | HEIGHT: 67 IN | DIASTOLIC BLOOD PRESSURE: 62 MMHG | WEIGHT: 210 LBS

## 2017-06-27 DIAGNOSIS — C77.3 MALIGNANT NEOPLASM METASTATIC TO LYMPH NODE OF AXILLA: ICD-10-CM

## 2017-06-27 DIAGNOSIS — L08.9 WOUND INFECTION: Primary | ICD-10-CM

## 2017-06-27 DIAGNOSIS — C79.51 SECONDARY CANCER OF BONE: ICD-10-CM

## 2017-06-27 DIAGNOSIS — C50.811 MALIGNANT NEOPLASM OF OVERLAPPING SITES OF RIGHT FEMALE BREAST: ICD-10-CM

## 2017-06-27 DIAGNOSIS — T14.8XXA WOUND INFECTION: Primary | ICD-10-CM

## 2017-06-27 PROCEDURE — 99283 EMERGENCY DEPT VISIT LOW MDM: CPT

## 2017-06-27 RX ORDER — CLINDAMYCIN HYDROCHLORIDE 150 MG/1
300 CAPSULE ORAL EVERY 8 HOURS
Qty: 42 CAPSULE | Refills: 0 | Status: SHIPPED | OUTPATIENT
Start: 2017-06-27 | End: 2017-07-04

## 2017-06-28 ENCOUNTER — OFFICE VISIT (OUTPATIENT)
Dept: HEMATOLOGY/ONCOLOGY | Facility: CLINIC | Age: 47
End: 2017-06-28
Payer: MEDICAID

## 2017-06-28 VITALS
OXYGEN SATURATION: 97 % | SYSTOLIC BLOOD PRESSURE: 104 MMHG | BODY MASS INDEX: 32.67 KG/M2 | DIASTOLIC BLOOD PRESSURE: 62 MMHG | RESPIRATION RATE: 18 BRPM | HEART RATE: 87 BPM | HEIGHT: 67 IN | WEIGHT: 208.13 LBS | TEMPERATURE: 98 F

## 2017-06-28 DIAGNOSIS — G89.3 NEOPLASM RELATED PAIN: ICD-10-CM

## 2017-06-28 DIAGNOSIS — C77.3 MALIGNANT NEOPLASM METASTATIC TO LYMPH NODE OF AXILLA: ICD-10-CM

## 2017-06-28 DIAGNOSIS — C50.811 MALIGNANT NEOPLASM OF OVERLAPPING SITES OF RIGHT FEMALE BREAST: Primary | ICD-10-CM

## 2017-06-28 DIAGNOSIS — C79.51 SECONDARY CANCER OF BONE: ICD-10-CM

## 2017-06-28 PROCEDURE — 99213 OFFICE O/P EST LOW 20 MIN: CPT | Mod: PBBFAC,PO | Performed by: INTERNAL MEDICINE

## 2017-06-28 PROCEDURE — 99215 OFFICE O/P EST HI 40 MIN: CPT | Mod: S$PBB,,, | Performed by: INTERNAL MEDICINE

## 2017-06-28 PROCEDURE — 99999 PR PBB SHADOW E&M-EST. PATIENT-LVL III: CPT | Mod: PBBFAC,,, | Performed by: INTERNAL MEDICINE

## 2017-06-28 NOTE — ED PROVIDER NOTES
SCRIBE #1 NOTE: IAdilene, am scribing for, and in the presence of, Bj Harry MD. I have scribed the entire note.      History      Chief Complaint   Patient presents with    Leg Pain     skin graft to L leg, c/o pain and redness to area       Review of patient's allergies indicates:   Allergen Reactions    Vancomycin analogues Itching        HPI   HPI    6/27/2017, 10:56 PM   History obtained from the spouse and patient      History of Present Illness: Josey Flores is a 46 y.o. female patient 11 days s/p skin graft to the axillas who presents to the Emergency Department for L thigh pain (donor site for skin grafts). Pt reports that the area is painful and that she has erythema surrounding the site. She states she was not d/c from the hospital with any abx. Pt had a post op f/u yesterday and was told the staples securing the bottom dressing will be removed next week instead of this week since the top dressing has not been removed yet. Symptoms are constant and moderate in severity. No mitigating or exacerbating factors reported. Patient has no c/o fever, chills, N/V, drainage from wound, gait problem, and all other sxs at this time. No further complaints or concerns at this time.       Arrival mode: Personal vehicle    PCP: Aileen Sadler MD       Past Medical History:  Past Medical History:   Diagnosis Date    Cancer     metastatic-lymph nodes, bone, and thyroid        Past Surgical History:  Past Surgical History:   Procedure Laterality Date    ABSCESS DRAINAGE      bilateral axilla    ADENOIDECTOMY      APPENDECTOMY      CHOLECYSTECTOMY      SKIN GRAFT  06/16/2017    TONSILLECTOMY           Family History:  History reviewed. No pertinent family history.    Social History:  Social History     Social History Main Topics    Smoking status: Former Smoker     Packs/day: 1.00     Types: Cigarettes     Quit date: 6/11/2017    Smokeless tobacco: Never Used      Comment: no smoking after mn  prior to surgery    Alcohol use No    Drug use: No    Sexual activity: Yes     Partners: Male       ROS   Review of Systems   Constitutional: Negative for chills, diaphoresis and fever.   HENT: Negative for sore throat.    Respiratory: Negative for shortness of breath.    Cardiovascular: Negative for chest pain.   Gastrointestinal: Negative for nausea and vomiting.   Genitourinary: Negative for dysuria.   Musculoskeletal: Negative for back pain.        (+) L thigh pain   Skin: Positive for color change (erythema) and wound (L thigh). Negative for rash.        (-) drainage   Neurological: Negative for weakness.   Hematological: Does not bruise/bleed easily.   All other systems reviewed and are negative.      Physical Exam      Initial Vitals [06/27/17 2205]   BP Pulse Resp Temp SpO2   (!) 150/62 87 18 97.9 °F (36.6 °C) 95 %      MAP       91.33          Physical Exam  Nursing Notes and Vital Signs Reviewed.  Constitutional: Patient is in no acute distress. Awake and alert. Well-developed and well-nourished.  Head: Atraumatic. Normocephalic.  Eyes: PERRL. EOM intact. Conjunctivae are not pale. No scleral icterus.  ENT: Mucous membranes are moist. Oropharynx is clear and symmetric.    Neck: Supple. Full ROM. No lymphadenopathy.  Cardiovascular: Regular rate. Regular rhythm. No murmurs, rubs, or gallops. Distal pulses are 2+ and symmetric.  Pulmonary/Chest: No respiratory distress. Clear to auscultation bilaterally. No wheezing, rales, or rhonchi.  Abdominal: Soft and non-distended.  There is no tenderness.    Musculoskeletal: Moves all extremities. No obvious deformities. No edema. No calf tenderness.  Skin: Warm and dry. Erythema surrounding wound on the L thigh. Area of erythema outlined for reference.  Neurological:  Alert, awake, and appropriate.  Normal gait. Normal speech.  No acute focal neurological deficits are appreciated.  Psychiatric: Normal affect. Good eye contact. Appropriate in content.  ED Course   "  Procedures  ED Vital Signs:  Vitals:    06/27/17 2205   BP: (!) 150/62   Pulse: 87   Resp: 18   Temp: 97.9 °F (36.6 °C)   TempSrc: Oral   SpO2: 95%   Weight: 95.3 kg (210 lb)   Height: 5' 7" (1.702 m)     The Emergency Provider reviewed the vital signs and test results, which are outlined above.    ED Discussion   10:50 PM: Reviewed patient's ; patient has been prescribed 150 pain pills within the last 3 weeks.     10:56 PM: Discussed pt dx and plan of tx. Informed pt to follow up with PCP. All questions and concerns were addressed at this time. Pt expresses understanding of information and instructions, and is comfortable with plan to discharge. Pt is stable for discharge.    I discussed with patient that evaluation in the ED does not suggest any emergent or life threatening medical conditions requiring immediate intervention beyond what was provided in the ED, and I believe patient is safe for discharge.  Regardless, an unremarkable evaluation in the ED does not preclude the development or presence of a serious of life threatening condition. As such, patient was instructed to return immediately for any worsening or change in current symptoms.      ED Medication(s):  Medications - No data to display    Discharge Medication List as of 6/27/2017 10:52 PM      START taking these medications    Details   clindamycin (CLEOCIN) 150 MG capsule Take 2 capsules (300 mg total) by mouth every 8 (eight) hours., Starting Tue 6/27/2017, Until Tue 7/4/2017, Print             Follow-up Information     Richard Delong MD In 1 day.    Specialty:  Hematology and Oncology  Why:  as scheduled  Contact information:  [address]  [place] [address] [phone]             [facility] - .    Specialty:  Emergency Medicine  Why:  As needed  Contact information:  [address] [facility] [address] [phone]                  Medical Decision Making              Scribe Attestation: "   Scribe #1: I performed the above scribed service and the documentation accurately describes the services I performed. I attest to the accuracy of the note.    Attending:   Physician Attestation Statement for Scribe #1: I, Bj Harry MD, personally performed the services described in this documentation, as scribed by Adilene Parish, in my presence, and it is both accurate and complete.          Clinical Impression       ICD-10-CM ICD-9-CM   1. Wound infection T14.8 958.3    L08.9    2. Malignant neoplasm of overlapping sites of right female breast C50.811 174.8   3. Malignant neoplasm metastatic to lymph node of axilla C77.3 196.3   4. Secondary cancer of bone C79.51 198.5       Disposition:   Disposition: Discharged  Condition: Stable         Bj Harry MD  06/28/17 0552

## 2017-06-28 NOTE — PROGRESS NOTES
Reason for visit: Right breast carcinoma  Date of Diagnosis: January 12, 2017    HPI:   The patient is a 46-year-old  female who presents to the hematology oncology clinic today for follow up for metastatic right breast carcinoma.    I have reviewed all of the patient's relevant clinical history available in the medical record and have utilized this in my evaluation and management recommendations today.  The patient had 2 separate areas in the right breast that were biopsied area the first was a right breast mass at 11:00 which was 3 cm from the nipple which showed invasive mammary carcinoma.  The invasive carcinoma measured at least 1.2 cm in greatest dimension and appeared high-grade.  This tumor was ER positive/AZ positive/HER-2 negative.  The second breast mass was biopsied from the retroperitoneal area lower area and was also positive for invasive mammary carcinoma.  This measured at least 0.4 cm and also appeared high-grade.  The tumor is morphologically very similar to the tumor in the prior specimen.  Receptor studies were not done on this specimen.  The patient also had a palpable right axillary lymph node which was biopsied and showed metastatic mammary carcinoma which measured at least 1.3 cm in greatest dimension.  The patient had self palpated this breast mass.  In addition the patient has a palpable mass in the region of her right elbow which she reports has been present for 9 years but had been slowly growing especially over the past year or 2.  She denies any pain associated with this.    Today the patient reports that she is taking percocet PRN pain for her problems related to abscesses/hydradenitis which was debrided in bilateral axillary regions and left groin in May 2017. She has since had skin grafts done. She was in the ER yesterday and started on clindamycin for wound infection in her left thigh. She reports that her percocet when necessary is not controlling this adequately.   Fluid/swelling in her legs is improving slowly. She is taking lasix everyday. She is getting home health assistance. She has follow-up scheduled with Dr. Wahl.  She denies any fevers or chills. Denies night sweats.  She denies any melena, hematochezia, hematemesis, hemoptysis or hematuria.  She denies any bowel or urinary complaints.  She denies any nausea, vomiting or abdominal pain. She reports chronic cough.  She continues on ibrance and letrozole. Ibrance was started on 2/21/17.    PAST MEDICAL HISTORY:   1. HSIL on pap smear s/p LEEP    SURGICAL HISTORY:   1.  Tonsillectomy and adenoidectomy  2.  Appendectomy  3.  Cholecystectomy  4.  D&C    FAMILY HISTORY: She reports that her maternal aunt had lung cancer in her 70s.  She used to smoke cigarettes.  Her maternal cousin had lung cancer in her 60s.  She used to smoke cigarettes.  She denies any other immediate family members with cancer or bleeding/clotting disorders.    SOCIAL HISTORY: She reports a 45+ pack year smoking history and currently smokes one and a half packs of cigarettes a day.  She drinks wine occasionally.  She denies any history of recreational drug use.  She is engaged and has 2 sons.  She works for [employer].  She lives in [place], [place].     ALLERGIES: [NKDA]    MEDICATIONS: [Medcard has been reviewed and/or reconciled.]    REVIEW OF SYSTEMS:   GENERAL: [No fevers, chills or sweats. Reports fatigue. Reports weight gain. Denies loss of appetite.]  HEENT: [No blurred vision, tinnitus, nasal discharge, sorethroat or dysphagia.]  HEART: [No chest pain, palpitations or shortness of breath.]    LUNGS: [Reports chronic cough. Denies hemoptysis or breathing problems.]  ABDOMEN: [No abdominal pain, nausea, vomiting, diarrhea, constipation or melena.]  GENITOURINARY: [No dysuria, bleeding or malodorous discharge.]  NEURO: [No headache, dizziness or vertigo.]  HEMATOLOGY: [No easy bruising, spontaneous bleeding or blood clots in the  past].  MUSCULOSKELETAL: [No arthralgias, myalgias or bone pains.]  SKIN: [No rashes or skin lesions.]  PSYCHIATRY: [No depression. Reports anxiety.]  She denies any suicidal or homicidal ideation.    PHYSICAL EXAMINATION:   VS: Reviewed on nurse's notes.  APPEARANCE: The patient is a well-developed, well-nourished and well-groomed  female who appears in mild distress from pain. She was accompanied to this clinic visit by her fiance.  HEENT: No scleral icterus. Both external auditory canals clear. No oral ulcers, lesions. Throat clear  HEAD: No sinus tenderness.  NECK: Supple. No palpable lymphadenopathy. Thyroid non-tender, no palpable masses  CHEST: Breath sounds clear bilaterally. No rales. No rhonchi. Unlabored respirations.  BREAST: Deferred today.  CARDIOVASCULAR: Normal S1, S2. Normal rate. Regular rhythm.  ABDOMEN: Bowel sounds normal. No tenderness. No abdominal distention. No hepatomegaly. No splenomegaly.  LYMPHATIC: No palpable supraclavicular lymphadenopathy. Bilateral axillary healing wounds noted.  EXTREMITIES: No clubbing, cyanosis. Palpable firm 3 cm mass in the right elbow is noted.  Left groin healing wound noted. 1+ edema in bilateral lower extremities.  SKIN: No lesions. No petechiae. No ecchymoses.   NEUROLOGIC: No focal findings. Alert & Oriented x 3. Mood appropriate to affect      LABS:   Reviewed    IMAGING:  Reviewed    IMPRESSION:  1.  Metastatic multifocal infiltrating ductal carcinoma of the right breast with right axillary and bone involvement [ER positive/WA positive/HER-2 negative]  2.  FDG avid right thyroid nodule  3.  Tobacco abuse  4.  Right elbow mass  5.  Anxiety and depression  6.  Chronic cough  7.  Right axillary hydradenitis/cellulitis and left inguinal hydradenitis/cellulitis s/p debridement and skin grafting    PLAN:  1.  I had a detailed discussion with the patient previously with regard to the diagnosis, prognosis and treatment options for metastatic multifocal  invasive ductal carcinoma of the right breast with right axillary and bone involvement.  2.  I have discussed that at this time her metastatic malignancy is potentially treatable but not curable.  3.  Results of PET/CT scan from January 23, 2017 were discussed in detail with the patient and her family members today. There are 3 osseous metastatic lesions one involving the manubrium and 2 involving the posterior left ilium most consistent with metastatic disease. She also has FDG avid right thyroid nodule which is highly suspicious for possible malignancy as well.    4.  The patient was strongly encouraged to quit smoking.  She expressed understanding.  Prescription for Xanax when necessary anxiety was previously given to the patient after full discussion of sedation precautions.    5. FSH and estradiol levels confirm menopausal status. TSH lab was normal.   6.  MRI of the brain on 1/31/17 to evaluate for any evidence of metastatic disease to complete staging workup was negative for metastatic malignancy to the brain. CT head done in March 2017 in ED was also ok.  7. Continue to proceed with ibrance/letrozole.  She appears to be tolerating this well so far with no significant side effects.   8. Her medical insurance denied genetic testing at [facility] with regard to her breast cancer. I will look at alternative lab options where this might be covered.  9. She has been advised to take calcium and vit d supplementation everyday. She is on xgeva injections for treatment of bone involvement by malignancy. Risks/benefits and common side effects discussed and she expressed understanding and agreement. She has dentures.  10. Continue Lasix for treatment of bilateral lower extremity edema.  Supplemental potassium was given as well.  12. Prescription for fentanyl patches was previously given to help with long-term pain management.  Risks/benefits and sedation/constipation precautions were discussed in detail and she  expressed understanding. This was denied by her insurance company. We will look into appealing this.  13.  I will plan on obtaining PET/CT scan in the near future once wound healing is complete.  Physical examination does show good response to ongoing treatment for metastatic breast carcinoma.  14. She knows to seek immediate medical attention/proceed to ER if wound infection in left thigh gets worse despite taking po clindamycin.    Return to clinic in 2 weeks with labs, PET/CT.  Possible Xgeva at follow up.  She knows to call sooner for any new problems or questions.    I spent greater than 40 minutes face-to-face with the patient discussing and reviewing all of the above in detail with greater than 50% of this time spent in counseling.      Richard Delong MD

## 2017-06-28 NOTE — ED NOTES
Pt seen, evaluated, and discharged to the lobby by provider without nursing assessment. See provider notes.

## 2017-07-03 ENCOUNTER — OFFICE VISIT (OUTPATIENT)
Dept: SURGERY | Facility: CLINIC | Age: 47
End: 2017-07-03
Payer: MEDICAID

## 2017-07-03 VITALS
WEIGHT: 207.44 LBS | BODY MASS INDEX: 32.49 KG/M2 | SYSTOLIC BLOOD PRESSURE: 102 MMHG | HEART RATE: 86 BPM | DIASTOLIC BLOOD PRESSURE: 66 MMHG | TEMPERATURE: 98 F

## 2017-07-03 DIAGNOSIS — Z94.5 STATUS POST SKIN GRAFT: Primary | ICD-10-CM

## 2017-07-03 PROCEDURE — 99024 POSTOP FOLLOW-UP VISIT: CPT | Mod: ,,, | Performed by: SURGERY

## 2017-07-03 PROCEDURE — 99213 OFFICE O/P EST LOW 20 MIN: CPT | Mod: PBBFAC | Performed by: SURGERY

## 2017-07-03 PROCEDURE — 99999 PR PBB SHADOW E&M-EST. PATIENT-LVL III: CPT | Mod: PBBFAC,,, | Performed by: SURGERY

## 2017-07-03 NOTE — PROGRESS NOTES
Subjective:       Patient ID: Josey Flores is a 46 y.o. female.    Follow-up after skin graft to the axilla    Chief Complaint: Post-op Evaluation    HPI  Review of Systems   Skin:        Redness around the donor site that required antibiotics       Objective:      Physical Exam   Skin:   The donor site was examined there is no existing erythema.  Staples removed.  The donor site has epithelialized.    The skin graft sites are well-healed with 100% take       Assessment:     split-thickness skin graft, 100% take.  Donor site is now healing well    Plan:       activity as tolerated.  Moisturizer to the skin sites.  Follow-up as about a month

## 2017-07-05 ENCOUNTER — TELEPHONE (OUTPATIENT)
Dept: HEMATOLOGY/ONCOLOGY | Facility: CLINIC | Age: 47
End: 2017-07-05

## 2017-07-05 DIAGNOSIS — C50.811 MALIGNANT NEOPLASM OF OVERLAPPING SITES OF RIGHT FEMALE BREAST: ICD-10-CM

## 2017-07-05 DIAGNOSIS — C79.51 SECONDARY CANCER OF BONE: ICD-10-CM

## 2017-07-05 DIAGNOSIS — G89.3 NEOPLASM RELATED PAIN: ICD-10-CM

## 2017-07-05 DIAGNOSIS — F41.9 ANXIETY: ICD-10-CM

## 2017-07-05 DIAGNOSIS — F32.A DEPRESSION, UNSPECIFIED DEPRESSION TYPE: ICD-10-CM

## 2017-07-05 DIAGNOSIS — C77.3 MALIGNANT NEOPLASM METASTATIC TO LYMPH NODE OF AXILLA: ICD-10-CM

## 2017-07-05 RX ORDER — ALPRAZOLAM 0.5 MG/1
0.5 TABLET ORAL 2 TIMES DAILY PRN
Qty: 60 TABLET | Refills: 0 | Status: SHIPPED | OUTPATIENT
Start: 2017-07-05 | End: 2017-07-20 | Stop reason: SDUPTHER

## 2017-07-05 RX ORDER — HYDROCODONE BITARTRATE AND ACETAMINOPHEN 7.5; 325 MG/1; MG/1
1 TABLET ORAL EVERY 6 HOURS PRN
Qty: 60 TABLET | Refills: 0 | Status: SHIPPED | OUTPATIENT
Start: 2017-07-05 | End: 2017-07-20 | Stop reason: SDUPTHER

## 2017-07-05 NOTE — TELEPHONE ENCOUNTER
----- Message from Phan Huntley sent at 7/5/2017 11:30 AM CDT -----  Contact: [phone]  Pt would like to consult with nurse about xanax refill and pain med hydrocodone refills. Please call back at [phone]. Thx Siddharth     [facility] [address] [place], [place] [address] [facility] [address]  Phone: [phone] Fax: [phone]

## 2017-07-05 NOTE — TELEPHONE ENCOUNTER
----- Message from Richard Delong MD sent at 7/5/2017  1:44 PM CDT -----  Contact: [phone]   Done. thx  ----- Message -----  From: Juliet Gale MA  Sent: 7/5/2017   1:37 PM  To: Richard Delong MD    Pt  Calling about xanax and Narco medication refill  thanks  ----- Message -----  From: Yane Westbrook  Sent: 7/5/2017   1:25 PM  To: Baljeet Ramos Staff    Returning call,please call back at [phone].  Thanks-[facility]

## 2017-07-08 ENCOUNTER — NURSE TRIAGE (OUTPATIENT)
Dept: ADMINISTRATIVE | Facility: CLINIC | Age: 47
End: 2017-07-08

## 2017-07-08 NOTE — TELEPHONE ENCOUNTER
"    Reason for Disposition   Patient sounds very sick or weak to the triager    Answer Assessment - Initial Assessment Questions  1. DIARRHEA SEVERITY: "How bad is the diarrhea?" "How many extra stools have you had in the past 24 hours than normal?"     - MILD: Few loose or mushy BMs; increase of 1-3 stools over normal daily number of stools; mild increase in ostomy output.    - MODERATE: Increase of 4-6 stools daily over normal; moderate increase in ostomy output.    - SEVERE (or Worst Possible): Increase of 7 or more stools daily over normal; moderate increase in ostomy output; incontinence.     D x 4 days, taking imodium AD. D x 7 in 24 hours. Afeb. No blood in stool   2. ONSET: "When did the diarrhea begin?"       7/4   3. BM CONSISTENCY: "How loose or watery is the diarrhea?"     Watery   4. VOMITING: "Are you also vomiting?" If so, ask: "How many times in the past 24 hours?"      No   5. ABDOMINAL PAIN: "Are you having any abdominal pain?" If yes: "What does it feel like?" (e.g., crampy, dull, intermittent, constant)      Below belly button at pelvis/cramping x 2 days   6. ABDOMINAL PAIN SEVERITY: If present, ask: "How bad is the pain?"  (e.g., Scale 1-10; mild, moderate, or severe)     - MILD (1-3): doesn't interfere with normal activities, abdomen soft and not tender to touch      - MODERATE (4-7): interferes with normal activities or awakens from sleep, tender to touch      - SEVERE (8-10): excruciating pain, doubled over, unable to do any normal activities      6   7. ORAL INTAKE: If vomiting, "Have you been able to drink liquids?" "How much fluids have you had in the past 24 hours?"    Water, tea. Ate toast for bkft   8. HYDRATION: "Any signs of dehydration?" (e.g., dry mouth [not just dry lips], too weak to stand, dizziness, new weight loss) "When did you last urinate?"     Last uop 245pm, + dry mouth, dizziness when first standing   9. EXPOSURE: "Have you traveled to a foreign country recently?" "Have " "you been exposed to anyone with diarrhea?" "Could you have eaten any food that was spoiled?"    No   10. OTHER SYMPTOMS: "Do you have any other symptoms?" (e.g., fever, blood in stool)       No   11. PREGNANCY: "Is there any chance you are pregnant?" "When was your last menstrual period?"       No    Protocols used: ST DIARRHEA-A-AH  abd cramping comes and goes lasts 5-10 mins goes away a few hours and then returns. Was on abx- dc'd on Monday. Had skin graft infection. Spoke with MD on call rec local ED for diarrhea/abd pain. Pt notified. Call back with questions.     "

## 2017-07-11 ENCOUNTER — TELEPHONE (OUTPATIENT)
Dept: RADIOLOGY | Facility: HOSPITAL | Age: 47
End: 2017-07-11

## 2017-07-12 ENCOUNTER — HOSPITAL ENCOUNTER (OUTPATIENT)
Dept: RADIOLOGY | Facility: HOSPITAL | Age: 47
Discharge: HOME | End: 2017-07-12
Attending: INTERNAL MEDICINE
Payer: MEDICAID

## 2017-07-12 ENCOUNTER — OFFICE VISIT (OUTPATIENT)
Dept: HEMATOLOGY/ONCOLOGY | Facility: CLINIC | Age: 47
End: 2017-07-12
Payer: MEDICAID

## 2017-07-12 ENCOUNTER — INFUSION (OUTPATIENT)
Dept: INFUSION THERAPY | Facility: HOSPITAL | Age: 47
End: 2017-07-12
Attending: INTERNAL MEDICINE
Payer: MEDICAID

## 2017-07-12 ENCOUNTER — OFFICE VISIT (OUTPATIENT)
Dept: OTOLARYNGOLOGY | Facility: CLINIC | Age: 47
End: 2017-07-12
Payer: MEDICAID

## 2017-07-12 VITALS
HEIGHT: 67 IN | BODY MASS INDEX: 33.21 KG/M2 | WEIGHT: 211.63 LBS | TEMPERATURE: 98 F | DIASTOLIC BLOOD PRESSURE: 60 MMHG | RESPIRATION RATE: 18 BRPM | SYSTOLIC BLOOD PRESSURE: 88 MMHG | HEART RATE: 88 BPM

## 2017-07-12 VITALS
OXYGEN SATURATION: 98 % | DIASTOLIC BLOOD PRESSURE: 60 MMHG | HEART RATE: 88 BPM | SYSTOLIC BLOOD PRESSURE: 88 MMHG | BODY MASS INDEX: 33.18 KG/M2 | WEIGHT: 211.88 LBS | TEMPERATURE: 97 F

## 2017-07-12 DIAGNOSIS — C79.51 SECONDARY CANCER OF BONE: ICD-10-CM

## 2017-07-12 DIAGNOSIS — C50.811 MALIGNANT NEOPLASM OF OVERLAPPING SITES OF RIGHT BREAST IN FEMALE, ESTROGEN RECEPTOR POSITIVE: ICD-10-CM

## 2017-07-12 DIAGNOSIS — Z17.0 MALIGNANT NEOPLASM OF OVERLAPPING SITES OF RIGHT BREAST IN FEMALE, ESTROGEN RECEPTOR POSITIVE: Primary | ICD-10-CM

## 2017-07-12 DIAGNOSIS — C50.811 MALIGNANT NEOPLASM OF OVERLAPPING SITES OF RIGHT FEMALE BREAST: ICD-10-CM

## 2017-07-12 DIAGNOSIS — E07.9 THYROID MASS: Primary | ICD-10-CM

## 2017-07-12 DIAGNOSIS — C50.811 MALIGNANT NEOPLASM OF OVERLAPPING SITES OF RIGHT BREAST IN FEMALE, ESTROGEN RECEPTOR POSITIVE: Primary | ICD-10-CM

## 2017-07-12 DIAGNOSIS — C77.3 MALIGNANT NEOPLASM METASTATIC TO LYMPH NODE OF AXILLA: ICD-10-CM

## 2017-07-12 DIAGNOSIS — Z17.0 MALIGNANT NEOPLASM OF OVERLAPPING SITES OF RIGHT BREAST IN FEMALE, ESTROGEN RECEPTOR POSITIVE: ICD-10-CM

## 2017-07-12 DIAGNOSIS — E04.1 THYROID NODULE: ICD-10-CM

## 2017-07-12 DIAGNOSIS — C79.51 SECONDARY CANCER OF BONE: Primary | ICD-10-CM

## 2017-07-12 DIAGNOSIS — G89.3 NEOPLASM RELATED PAIN: ICD-10-CM

## 2017-07-12 PROCEDURE — 63600175 PHARM REV CODE 636 W HCPCS: Mod: PO | Performed by: INTERNAL MEDICINE

## 2017-07-12 PROCEDURE — 78815 PET IMAGE W/CT SKULL-THIGH: CPT | Mod: 26,,, | Performed by: RADIOLOGY

## 2017-07-12 PROCEDURE — 99204 OFFICE O/P NEW MOD 45 MIN: CPT | Mod: S$PBB,,, | Performed by: ORTHOPAEDIC SURGERY

## 2017-07-12 PROCEDURE — 99215 OFFICE O/P EST HI 40 MIN: CPT | Mod: 25,S$PBB,, | Performed by: INTERNAL MEDICINE

## 2017-07-12 PROCEDURE — 96372 THER/PROPH/DIAG INJ SC/IM: CPT | Mod: PO

## 2017-07-12 PROCEDURE — 99999 PR PBB SHADOW E&M-EST. PATIENT-LVL III: CPT | Mod: PBBFAC,,, | Performed by: INTERNAL MEDICINE

## 2017-07-12 PROCEDURE — 99999 PR PBB SHADOW E&M-EST. PATIENT-LVL III: CPT | Mod: PBBFAC,,, | Performed by: ORTHOPAEDIC SURGERY

## 2017-07-12 RX ADMIN — DENOSUMAB 120 MG: 120 INJECTION SUBCUTANEOUS at 01:07

## 2017-07-12 NOTE — PROGRESS NOTES
Reason for visit: Right breast carcinoma  Date of Diagnosis: January 12, 2017    HPI:   The patient is a 46-year-old  female who presents to the hematology oncology clinic today for follow up for metastatic right breast carcinoma.    I have reviewed all of the patient's relevant clinical history available in the medical record and have utilized this in my evaluation and management recommendations today.  The patient had 2 separate areas in the right breast that were biopsied area the first was a right breast mass at 11:00 which was 3 cm from the nipple which showed invasive mammary carcinoma.  The invasive carcinoma measured at least 1.2 cm in greatest dimension and appeared high-grade.  This tumor was ER positive/[place] positive/HER-2 negative.  The second breast mass was biopsied from the retroperitoneal area lower area and was also positive for invasive mammary carcinoma.  This measured at least 0.4 cm and also appeared high-grade.  The tumor is morphologically very similar to the tumor in the prior specimen.  Receptor studies were not done on this specimen.  The patient also had a palpable right axillary lymph node which was biopsied and showed metastatic mammary carcinoma which measured at least 1.3 cm in greatest dimension.  The patient had self palpated this breast mass.  In addition the patient has a palpable mass in the region of her right elbow which she reports has been present for 9 years but had been slowly growing especially over the past year or 2.  She denies any pain associated with this.    Today the patient reports that she is taking percocet PRN pain for her problems related to abscesses/hydradenitis which was debrided in bilateral axillary regions and left groin in May 2017. She has since had skin grafts done. These are healing well. She reports that her Norco when necessary.  Fluid/swelling in her legs is improving slowly. She is taking lasix everyday. She is getting home health  assistance. She denies any fevers or chills. Denies night sweats.  She denies any melena, hematochezia, hematemesis, hemoptysis or hematuria.  She denies any bowel or urinary complaints.  She denies any nausea, vomiting or abdominal pain. She reports chronic cough.  She continues on ibrance and letrozole. Ibrance was started on 2/21/17.    PAST MEDICAL HISTORY:   1. HSIL on pap smear s/p LEEP    SURGICAL HISTORY:   1.  Tonsillectomy and adenoidectomy  2.  Appendectomy  3.  Cholecystectomy  4.  D&C    FAMILY HISTORY: She reports that her maternal aunt had lung cancer in her 70s.  She used to smoke cigarettes.  Her maternal cousin had lung cancer in her 60s.  She used to smoke cigarettes.  She denies any other immediate family members with cancer or bleeding/clotting disorders.    SOCIAL HISTORY: She reports a 45+ pack year smoking history. She quit in May 2017. She drinks wine occasionally.  She denies any history of recreational drug use.  She is engaged and has 2 sons.  She works for the [employer].  She lives in [place], [place].     ALLERGIES: [NKDA]    MEDICATIONS: [Medcard has been reviewed and/or reconciled.]    REVIEW OF SYSTEMS:   GENERAL: [No fevers, chills or sweats. Reports fatigue. Reports weight gain. Denies loss of appetite.]  HEENT: [No blurred vision, tinnitus, nasal discharge, sorethroat or dysphagia.]  HEART: [No chest pain, palpitations or shortness of breath.]    LUNGS: [Reports chronic cough. Denies hemoptysis or breathing problems.]  ABDOMEN: [No abdominal pain, nausea, vomiting, diarrhea, constipation or melena.]  GENITOURINARY: [No dysuria, bleeding or malodorous discharge.]  NEURO: [No headache, dizziness or vertigo.]  HEMATOLOGY: [No easy bruising, spontaneous bleeding or blood clots in the past].  MUSCULOSKELETAL: [No arthralgias, myalgias or bone pains.]  SKIN: [No rashes or skin lesions.]  PSYCHIATRY: [No depression. Reports anxiety.]      PHYSICAL EXAMINATION:   VS: Reviewed on  nurse's notes.  APPEARANCE: The patient is a well-developed, well-nourished and well-groomed  female who appears in mild distress from pain. She was accompanied to this clinic visit by her fiance.  HEENT: No scleral icterus. Both external auditory canals clear. No oral ulcers, lesions. Throat clear  HEAD: No sinus tenderness.  NECK: Supple. No palpable lymphadenopathy. Thyroid non-tender, no palpable masses  CHEST: Breath sounds clear bilaterally. No rales. No rhonchi. Unlabored respirations.  BREAST: Deferred today.  CARDIOVASCULAR: Normal S1, S2. Normal rate. Regular rhythm.  ABDOMEN: Bowel sounds normal. No tenderness. No abdominal distention. No hepatomegaly. No splenomegaly.  LYMPHATIC: No palpable supraclavicular lymphadenopathy. Bilateral axillary healing wounds noted.  EXTREMITIES: No clubbing, cyanosis. Palpable firm 3 cm mass in the right elbow is noted.  Trace edema in bilateral lower extremities.  SKIN: No lesions. No petechiae. No ecchymoses.   NEUROLOGIC: No focal findings. Alert & Oriented x 3. Mood appropriate to affect      LABS:   Reviewed    IMAGING:  Reviewed    IMPRESSION:  1.  Metastatic multifocal infiltrating ductal carcinoma of the right breast with right axillary and bone involvement [ER positive/[place] positive/HER-2 negative]  2.  FDG avid right thyroid nodule  3.  Tobacco abuse  4.  Right elbow mass  5.  Anxiety and depression  6.  Chronic cough  7.  Right axillary hydradenitis/cellulitis and left inguinal hydradenitis/cellulitis s/p debridement and skin grafting    PLAN:  1.  I had a detailed discussion with the patient previously with regard to the diagnosis, prognosis and treatment options for metastatic multifocal invasive ductal carcinoma of the right breast with right axillary and bone involvement.  2.  I have discussed that at this time her metastatic malignancy is potentially treatable but not curable.  3.  Results of PET/CT scan from 7/12/17 were discussed in detail with  the patient today. Findings consistent with treatment response with no FDG avid disease identified with essentially complete resolution of the previously noted right axillary adenopathy and right breast mass.  No FDG avidity associated with osseous lesions. She has persistent significantly FDG avid right thyroid lesion.   4.  The patient was strongly encouraged to quit smoking.  She expressed understanding.  Prescription for Xanax when necessary anxiety was previously given to the patient after full discussion of sedation precautions.    5. FSH and estradiol levels confirm menopausal status. TSH lab was normal.   6.  MRI of the brain on 1/31/17 to evaluate for any evidence of metastatic disease to complete staging workup was negative for metastatic malignancy to the brain. CT head done in March 2017 in ED was also ok.  7. Continue to proceed with ibrance/letrozole.  She appears to be tolerating this well so far with no significant side effects.   8. Her medical insurance denied genetic testing at [facility] with regard to her breast cancer. I will look at alternative lab options where this might be covered.  9. She has been advised to take calcium and vit d supplementation everyday. She is on xgeva injections for treatment of bone involvement by malignancy. Risks/benefits and common side effects discussed and she expressed understanding and agreement. She has dentures.  10. Continue Lasix for treatment of bilateral lower extremity edema.  Supplemental potassium was given as well.  11. I will schedule ultrasound of the thyroid. ENT consult to discuss further evaluation of this.    Return to clinic in 1 month with labs. Possible Xgeva at follow up.  She knows to call sooner for any new problems or questions.    Richard Delong MD

## 2017-07-12 NOTE — LETTER
July 12, 2017      Richard Delong MD  [address] [id number]           [facility]  [address] [id number]  Phone: [phone]  Fax: [phone]          Patient: Josey Flores   MR Number: [id number]   YOB: 1970   Date of Visit: 7/12/2017       Dear Dr. Richard Delong:    Thank you for referring Josey Flores to me for evaluation. Attached you will find relevant portions of my assessment and plan of care.    If you have questions, please do not hesitate to call me. I look forward to following Josey Flores along with you.    Sincerely,    Shelia Prince MD    Enclosure  CC:  No Recipients    If you would like to receive this communication electronically, please contact [email or web address] or [phone] to request more information on [facility] access.    For providers and/or their staff who would like to refer a patient to [facility], please contact us through our one-stop-shop provider referral line, [facility], at [phone].    If you feel you have received this communication in error or would no longer like to receive these types of communications, please e-mail [email or web address]

## 2017-07-12 NOTE — PROGRESS NOTES
Subjective:       Patient ID: Josey Flores is a 46 y.o. female.    Chief Complaint: Thyroid Problem (thyroid nodule)    Patient is a very pleasant 46 year old female here to see me today for the first time for evaluation of her thyroid.  She has a history of breast cancer, diagnosed in November.  She had metastasis to lymph nodes and bone, and is currently in remission.  She was treated with chemotherapy as well as surgery.  At the time of her first PET-CT she was noted to have a hypermetabolic nodule in her thyroid.  That was her first time to be aware of a lesion in her neck, and she does not think it has grown over the last few months.  She is not aware of any family history of thyroid issues.  She is able to eat and drink solids and liquids, but sometimes has a feeling pressure and pain with swallowing.  She denies any hoarseness or change in her voice. She has had some weight fluctuations, but relates that to chemotherapy.      Review of Systems   Constitutional: Negative for chills, fatigue, fever and unexpected weight change.   HENT: Positive for trouble swallowing. Negative for congestion, dental problem, ear discharge, ear pain, facial swelling, hearing loss, nosebleeds, postnasal drip, rhinorrhea, sinus pressure, sneezing, sore throat, tinnitus and voice change.    Eyes: Negative for redness, itching and visual disturbance.   Respiratory: Negative for cough, choking, shortness of breath and wheezing.    Cardiovascular: Negative for chest pain and palpitations.   Gastrointestinal: Negative for abdominal pain.        No reflux.   Musculoskeletal: Negative for gait problem.   Skin: Negative for rash.   Neurological: Positive for headaches. Negative for dizziness (intermittent) and light-headedness.       Objective:      Physical Exam   Constitutional: She is oriented to person, place, and time. She appears well-developed and well-nourished. No distress.   HENT:   Head: Normocephalic and atraumatic.    Right Ear: Tympanic membrane, external ear and ear canal normal.   Left Ear: Tympanic membrane, external ear and ear canal normal.   Nose: Nose normal. No mucosal edema, rhinorrhea, nasal deformity or septal deviation. No epistaxis. Right sinus exhibits no maxillary sinus tenderness and no frontal sinus tenderness. Left sinus exhibits no maxillary sinus tenderness and no frontal sinus tenderness.   Mouth/Throat: Uvula is midline, oropharynx is clear and moist and mucous membranes are normal. Mucous membranes are not pale and not dry. No dental caries. No oropharyngeal exudate or posterior oropharyngeal erythema.   Eyes: Conjunctivae, EOM and lids are normal. Pupils are equal, round, and reactive to light. Right eye exhibits no chemosis. Left eye exhibits no chemosis. Right conjunctiva is not injected. Left conjunctiva is not injected. No scleral icterus. Right eye exhibits normal extraocular motion and no nystagmus. Left eye exhibits normal extraocular motion and no nystagmus.   Neck: Trachea normal and phonation normal. No tracheal tenderness present. No tracheal deviation present. Thyroid mass (right thyroid mass, firm, elevates with swallow) present. No thyromegaly present.   Cardiovascular: Intact distal pulses.    Pulmonary/Chest: Effort normal. No stridor. No respiratory distress.   Abdominal: She exhibits no distension.   Lymphadenopathy:        Head (right side): No submental, no submandibular, no preauricular, no posterior auricular and no occipital adenopathy present.        Head (left side): No submental, no submandibular, no preauricular, no posterior auricular and no occipital adenopathy present.     She has no cervical adenopathy.   Neurological: She is alert and oriented to person, place, and time. No cranial nerve deficit.   Skin: Skin is warm and dry. No rash noted. No erythema.   Psychiatric: She has a normal mood and affect. Her behavior is normal.       CT PET:  Representative image of thyroid  hypermetabolism below.            Assessment:       1. Thyroid mass    2. Malignant neoplasm of overlapping sites of right breast in female, estrogen receptor positive        Plan:       1.  Thyroid mass:  She has a 3 cm hypermetabolic thyroid nodule, and I would strongly recommend FNA.  Will schedule, and will have her return to see Dr. Baugh one week following for pathology results.  Discussed that while it is possible to have a breast metastasis to the thyroid, it is more likely that this represents primary thyroid pathology.  Will review pathology and make additional recommendations at that time.  2.  Metastatic breast cancer:  Now in remission, doing well at this time.

## 2017-07-18 ENCOUNTER — TELEPHONE (OUTPATIENT)
Dept: HEMATOLOGY/ONCOLOGY | Facility: CLINIC | Age: 47
End: 2017-07-18

## 2017-07-18 NOTE — TELEPHONE ENCOUNTER
Patient is scheduled for a thyroid ultrasound on 7/19/17.  Ent is doing an ultrasound guided biopsy after that will over lap that ultrasound  Do you want to still get the ultrasound?

## 2017-07-19 ENCOUNTER — HOSPITAL ENCOUNTER (OUTPATIENT)
Dept: RADIOLOGY | Facility: HOSPITAL | Age: 47
Discharge: HOME | End: 2017-07-19
Attending: ORTHOPAEDIC SURGERY
Payer: MEDICAID

## 2017-07-19 DIAGNOSIS — C50.811 MALIGNANT NEOPLASM OF OVERLAPPING SITES OF RIGHT BREAST IN FEMALE, ESTROGEN RECEPTOR POSITIVE: ICD-10-CM

## 2017-07-19 DIAGNOSIS — Z17.0 MALIGNANT NEOPLASM OF OVERLAPPING SITES OF RIGHT BREAST IN FEMALE, ESTROGEN RECEPTOR POSITIVE: ICD-10-CM

## 2017-07-19 DIAGNOSIS — E07.9 THYROID MASS: ICD-10-CM

## 2017-07-19 PROCEDURE — 76942 ECHO GUIDE FOR BIOPSY: CPT | Mod: TC

## 2017-07-19 PROCEDURE — 88173 CYTOPATH EVAL FNA REPORT: CPT | Performed by: PATHOLOGY

## 2017-07-19 PROCEDURE — 88173 CYTOPATH EVAL FNA REPORT: CPT | Mod: 26,,, | Performed by: PATHOLOGY

## 2017-07-19 NOTE — H&P (VIEW-ONLY)
Reason for visit: Right breast carcinoma  Date of Diagnosis: January 12, 2017    HPI:   The patient is a 46-year-old  female who presents to the hematology oncology clinic today for follow up for metastatic right breast carcinoma.    I have reviewed all of the patient's relevant clinical history available in the medical record and have utilized this in my evaluation and management recommendations today.  The patient had 2 separate areas in the right breast that were biopsied area the first was a right breast mass at 11:00 which was 3 cm from the nipple which showed invasive mammary carcinoma.  The invasive carcinoma measured at least 1.2 cm in greatest dimension and appeared high-grade.  This tumor was ER positive/CA positive/HER-2 negative.  The second breast mass was biopsied from the retroperitoneal area lower area and was also positive for invasive mammary carcinoma.  This measured at least 0.4 cm and also appeared high-grade.  The tumor is morphologically very similar to the tumor in the prior specimen.  Receptor studies were not done on this specimen.  The patient also had a palpable right axillary lymph node which was biopsied and showed metastatic mammary carcinoma which measured at least 1.3 cm in greatest dimension.  The patient had self palpated this breast mass.  In addition the patient has a palpable mass in the region of her right elbow which she reports has been present for 9 years but had been slowly growing especially over the past year or 2.  She denies any pain associated with this.    Today the patient reports that she is taking percocet PRN pain for her problems related to abscesses/hydradenitis which was debrided in bilateral axillary regions and left groin in May 2017. She has since had skin grafts done. These are healing well. She reports that her Norco when necessary.  Fluid/swelling in her legs is improving slowly. She is taking lasix everyday. She is getting home health  assistance. She denies any fevers or chills. Denies night sweats.  She denies any melena, hematochezia, hematemesis, hemoptysis or hematuria.  She denies any bowel or urinary complaints.  She denies any nausea, vomiting or abdominal pain. She reports chronic cough.  She continues on ibrance and letrozole. Ibrance was started on 2/21/17.    PAST MEDICAL HISTORY:   1. HSIL on pap smear s/p LEEP    SURGICAL HISTORY:   1.  Tonsillectomy and adenoidectomy  2.  Appendectomy  3.  Cholecystectomy  4.  D&C    FAMILY HISTORY: She reports that her maternal aunt had lung cancer in her 70s.  She used to smoke cigarettes.  Her maternal cousin had lung cancer in her 60s.  She used to smoke cigarettes.  She denies any other immediate family members with cancer or bleeding/clotting disorders.    SOCIAL HISTORY: She reports a 45+ pack year smoking history. She quit in May 2017. She drinks wine occasionally.  She denies any history of recreational drug use.  She is engaged and has 2 sons.  She works for the [employer].  She lives in [place], [place].     ALLERGIES: [NKDA]    MEDICATIONS: [Medcard has been reviewed and/or reconciled.]    REVIEW OF SYSTEMS:   GENERAL: [No fevers, chills or sweats. Reports fatigue. Reports weight gain. Denies loss of appetite.]  HEENT: [No blurred vision, tinnitus, nasal discharge, sorethroat or dysphagia.]  HEART: [No chest pain, palpitations or shortness of breath.]    LUNGS: [Reports chronic cough. Denies hemoptysis or breathing problems.]  ABDOMEN: [No abdominal pain, nausea, vomiting, diarrhea, constipation or melena.]  GENITOURINARY: [No dysuria, bleeding or malodorous discharge.]  NEURO: [No headache, dizziness or vertigo.]  HEMATOLOGY: [No easy bruising, spontaneous bleeding or blood clots in the past].  MUSCULOSKELETAL: [No arthralgias, myalgias or bone pains.]  SKIN: [No rashes or skin lesions.]  PSYCHIATRY: [No depression. Reports anxiety.]      PHYSICAL EXAMINATION:   VS: Reviewed on  nurse's notes.  APPEARANCE: The patient is a well-developed, well-nourished and well-groomed  female who appears in mild distress from pain. She was accompanied to this clinic visit by her fiance.  HEENT: No scleral icterus. Both external auditory canals clear. No oral ulcers, lesions. Throat clear  HEAD: No sinus tenderness.  NECK: Supple. No palpable lymphadenopathy. Thyroid non-tender, no palpable masses  CHEST: Breath sounds clear bilaterally. No rales. No rhonchi. Unlabored respirations.  BREAST: Deferred today.  CARDIOVASCULAR: Normal S1, S2. Normal rate. Regular rhythm.  ABDOMEN: Bowel sounds normal. No tenderness. No abdominal distention. No hepatomegaly. No splenomegaly.  LYMPHATIC: No palpable supraclavicular lymphadenopathy. Bilateral axillary healing wounds noted.  EXTREMITIES: No clubbing, cyanosis. Palpable firm 3 cm mass in the right elbow is noted.  Trace edema in bilateral lower extremities.  SKIN: No lesions. No petechiae. No ecchymoses.   NEUROLOGIC: No focal findings. Alert & Oriented x 3. Mood appropriate to affect      LABS:   Reviewed    IMAGING:  Reviewed    IMPRESSION:  1.  Metastatic multifocal infiltrating ductal carcinoma of the right breast with right axillary and bone involvement [ER positive/[place] positive/HER-2 negative]  2.  FDG avid right thyroid nodule  3.  Tobacco abuse  4.  Right elbow mass  5.  Anxiety and depression  6.  Chronic cough  7.  Right axillary hydradenitis/cellulitis and left inguinal hydradenitis/cellulitis s/p debridement and skin grafting    PLAN:  1.  I had a detailed discussion with the patient previously with regard to the diagnosis, prognosis and treatment options for metastatic multifocal invasive ductal carcinoma of the right breast with right axillary and bone involvement.  2.  I have discussed that at this time her metastatic malignancy is potentially treatable but not curable.  3.  Results of PET/CT scan from 7/12/17 were discussed in detail with  the patient today. Findings consistent with treatment response with no FDG avid disease identified with essentially complete resolution of the previously noted right axillary adenopathy and right breast mass.  No FDG avidity associated with osseous lesions. She has persistent significantly FDG avid right thyroid lesion.   4.  The patient was strongly encouraged to quit smoking.  She expressed understanding.  Prescription for Xanax when necessary anxiety was previously given to the patient after full discussion of sedation precautions.    5. FSH and estradiol levels confirm menopausal status. TSH lab was normal.   6.  MRI of the brain on 1/31/17 to evaluate for any evidence of metastatic disease to complete staging workup was negative for metastatic malignancy to the brain. CT head done in March 2017 in ED was also ok.  7. Continue to proceed with ibrance/letrozole.  She appears to be tolerating this well so far with no significant side effects.   8. Her medical insurance denied genetic testing at [facility] with regard to her breast cancer. I will look at alternative lab options where this might be covered.  9. She has been advised to take calcium and vit d supplementation everyday. She is on xgeva injections for treatment of bone involvement by malignancy. Risks/benefits and common side effects discussed and she expressed understanding and agreement. She has dentures.  10. Continue Lasix for treatment of bilateral lower extremity edema.  Supplemental potassium was given as well.  11. I will schedule ultrasound of the thyroid. ENT consult to discuss further evaluation of this.    Return to clinic in 1 month with labs. Possible Xgeva at follow up.  She knows to call sooner for any new problems or questions.    Richard Delong MD

## 2017-07-19 NOTE — DISCHARGE SUMMARY
Sterile technique was performed in the right anterior neck, lidocaine was used as a local anesthetic.  Multiple samples taken from right thyroid nodule, 2 ccs of reddish fluid also removed and sent to lab for eval.  Pt tolerated the procedure well without immediate complications.  Please see radiologist report for details. F/u with PCP and/or ordering physician.

## 2017-07-20 ENCOUNTER — TELEPHONE (OUTPATIENT)
Dept: HEMATOLOGY/ONCOLOGY | Facility: CLINIC | Age: 47
End: 2017-07-20

## 2017-07-20 DIAGNOSIS — C79.51 SECONDARY CANCER OF BONE: ICD-10-CM

## 2017-07-20 DIAGNOSIS — C50.811 MALIGNANT NEOPLASM OF OVERLAPPING SITES OF RIGHT BREAST IN FEMALE, ESTROGEN RECEPTOR POSITIVE: ICD-10-CM

## 2017-07-20 DIAGNOSIS — Z17.0 MALIGNANT NEOPLASM OF OVERLAPPING SITES OF RIGHT BREAST IN FEMALE, ESTROGEN RECEPTOR POSITIVE: ICD-10-CM

## 2017-07-20 DIAGNOSIS — C77.3 MALIGNANT NEOPLASM METASTATIC TO LYMPH NODE OF AXILLA: ICD-10-CM

## 2017-07-20 DIAGNOSIS — F41.9 ANXIETY: ICD-10-CM

## 2017-07-20 DIAGNOSIS — G89.3 NEOPLASM RELATED PAIN: ICD-10-CM

## 2017-07-20 DIAGNOSIS — F32.A DEPRESSION, UNSPECIFIED DEPRESSION TYPE: ICD-10-CM

## 2017-07-20 RX ORDER — ALPRAZOLAM 0.5 MG/1
0.5 TABLET ORAL 2 TIMES DAILY PRN
Qty: 60 TABLET | Refills: 0 | Status: SHIPPED | OUTPATIENT
Start: 2017-07-20 | End: 2017-09-05 | Stop reason: SDUPTHER

## 2017-07-20 RX ORDER — HYDROCODONE BITARTRATE AND ACETAMINOPHEN 7.5; 325 MG/1; MG/1
1 TABLET ORAL EVERY 6 HOURS PRN
Qty: 60 TABLET | Refills: 0 | Status: SHIPPED | OUTPATIENT
Start: 2017-07-20 | End: 2017-08-07 | Stop reason: SDUPTHER

## 2017-07-20 NOTE — TELEPHONE ENCOUNTER
----- Message from Ton Baptiste sent at 7/20/2017 12:00 PM CDT -----  1. What is the name of the medication you are requesting? alprazolam (XANAX) 0.5 MG tablet  2. What is the dose? .5mg  3. How do you take the medication? Orally, topically, etc? orally  4. How often do you take this medication? Twice daily  5. Do you need a 30 day or 90 day supply? 30  6. How many refills are you requesting?   7. What is your preferred pharmacy and location of the pharmacy?   [facility] in [facility]  8. Who can we contact with further questions? [phone] or [phone]    1. What is the name of the medication you are requesting? hydrocodone-acetaminophen 7.5-325mg (NORCO) 7.5-325 mg per tablet  2. What is the dose? 7.5mg  3. How do you take the medication? Orally, topically, etc? orally  4. How often do you take this medication? One every 6 hrs  5. Do you need a 30 day or 90 day supply? 30  6. How many refills are you requesting?   7. What is your preferred pharmacy and location of the pharmacy? [facility] in [facility]  8. Who can we contact with further questions? [phone] or [phone]

## 2017-07-24 ENCOUNTER — OFFICE VISIT (OUTPATIENT)
Dept: OTOLARYNGOLOGY | Facility: CLINIC | Age: 47
End: 2017-07-24
Payer: MEDICAID

## 2017-07-24 ENCOUNTER — LAB VISIT (OUTPATIENT)
Dept: LAB | Facility: HOSPITAL | Age: 47
End: 2017-07-24
Attending: OTOLARYNGOLOGY
Payer: MEDICAID

## 2017-07-24 VITALS
WEIGHT: 211.63 LBS | DIASTOLIC BLOOD PRESSURE: 72 MMHG | HEART RATE: 84 BPM | BODY MASS INDEX: 33.15 KG/M2 | SYSTOLIC BLOOD PRESSURE: 121 MMHG | TEMPERATURE: 99 F

## 2017-07-24 DIAGNOSIS — Z17.0 MALIGNANT NEOPLASM OF OVERLAPPING SITES OF RIGHT BREAST IN FEMALE, ESTROGEN RECEPTOR POSITIVE: ICD-10-CM

## 2017-07-24 DIAGNOSIS — C73 MALIGNANT NEOPLASM OF THYROID GLAND: ICD-10-CM

## 2017-07-24 DIAGNOSIS — C73 MALIGNANT NEOPLASM OF THYROID GLAND: Primary | ICD-10-CM

## 2017-07-24 DIAGNOSIS — C50.811 MALIGNANT NEOPLASM OF OVERLAPPING SITES OF RIGHT BREAST IN FEMALE, ESTROGEN RECEPTOR POSITIVE: ICD-10-CM

## 2017-07-24 PROCEDURE — 83970 ASSAY OF PARATHORMONE: CPT

## 2017-07-24 PROCEDURE — 84443 ASSAY THYROID STIM HORMONE: CPT

## 2017-07-24 PROCEDURE — 84439 ASSAY OF FREE THYROXINE: CPT

## 2017-07-24 PROCEDURE — 99999 PR PBB SHADOW E&M-EST. PATIENT-LVL IV: CPT | Mod: PBBFAC,,, | Performed by: OTOLARYNGOLOGY

## 2017-07-24 PROCEDURE — 36415 COLL VENOUS BLD VENIPUNCTURE: CPT | Mod: PO

## 2017-07-24 PROCEDURE — 99215 OFFICE O/P EST HI 40 MIN: CPT | Mod: S$PBB,,, | Performed by: OTOLARYNGOLOGY

## 2017-07-25 LAB
PTH-INTACT SERPL-MCNC: 41 PG/ML
T4 FREE SERPL-MCNC: 0.74 NG/DL
TSH SERPL DL<=0.005 MIU/L-ACNC: 0.85 UIU/ML

## 2017-07-25 NOTE — PROGRESS NOTES
Referring Provider:    No referring provider defined for this encounter.  Subjective:   Patient: Josey Flores [id number], :1970   Visit date:2017 9:26 PM    Chief Complaint:  Thyroid Mass (per Dr. Prince review FNA )    HPI:  Josey is a 46 y.o. female who I was asked to see in consultation for evaluation of the following issue(s):    COMPRESSIVE SYMPTOMS OR MINOR RISK FACTORS FOR THYROID CANCER:  Increasing in size over the past 6 months:  Yes  Dysphagia: No  Dyspnea on exertion:  No  Orthopnea:  No  Hemoptysis:  No  Voice changes:  No  Pain:  Yes  AGE >45  Yes   FEMALE Yes    HIGH RISK HISTORY FOR THYROID CANCER:  Thyroid cancer in 1 or more first degree relatives:  No  History of radiation:  No  Prior thyroidectomy with dx of thyroid cancer:  No  PET positive nodule:  Yes  Multiple Endocrine Neoplasia:  No  Familial Medullary Thyroid Cancer:  No    (2009 REVISED [employer]ICAN [employer]ATION MANAGEMENT GUIDELINES FOR PATIENTS WITH THYROID NODULES AND DIFFERENTIATED THYROID CANCER)      TSH:  Unknown    She has metastatic breast cancer.  She has had multiple operations for this as well as adjuvant therapy.  Her most recent PET demonstrated excellent response to therapy with no FDG avid lesions except for the thyroid.    Review of Systems:  Negative unless checked off.  Gen:  []fever   [x]fatigue  HENT:  []nosebleeds  []dental problem   Eyes:  []photophobia  []visual disturbance  Resp:  []chest tightness []wheezing  Card:  []chest pain  []leg swelling  GI:  []abdominal pain []blood in stool  :  []dysuria  []hematuria  Musc:  []joint swelling  []gait problem  Skin:  []color change  []pallor  Neuro:  []seizures  []numbness  Hem:  [x]bruise/bleed easily  Psych:  []hallucinations  [x]behavioral problems- anxiety, depression  Allergy/Imm: is allergic to vancomycin analogues.    Her meds, allergies, medical, surgical, social & family histories were reviewed & updated:  -     She has a current  medication list which includes the following prescription(s): alprazolam, citalopram, ergocalciferol, fentanyl, furosemide, hydrocodone-acetaminophen 7.5-325mg, ibrance, letrozole, multivitamin, ondansetron, and potassium chloride sa.  -     She  has a past medical history of Cancer.   -     She  does not have any pertinent problems on file.   -     She  has a past surgical history that includes Cholecystectomy; Tonsillectomy; Adenoidectomy; Appendectomy; Abscess drainage; and Skin graft (06/16/2017).  -     She  reports that she quit smoking about 6 weeks ago. Her smoking use included Cigarettes. She smoked 1.00 pack per day. She has never used smokeless tobacco. She reports that she does not drink alcohol or use drugs.  -     Her family history is not on file.  -     She is allergic to vancomycin analogues.    Objective:   Physical Exam:  Vitals:  /72   Pulse 84   Temp 98.5 °F (36.9 °C) (Tympanic)   Wt 96 kg (211 lb 10.3 oz)   LMP  (LMP Unknown)   BMI 33.15 kg/m²   General appearance:  Well developed, well nourished    Eyes:  Extraocular motions intact, PERRL    Communication:  no hoarseness, no dysphonia    Ears:  Otoscopy of external auditory canals and tympanic membranes was normal, clinical speech reception thresholds grossly intact, no mass/lesion of auricle.  Nose:  No masses/lesions of external nose, nasal mucosa, septum, and turbinates were within normal limits.  Mouth:  No mass/lesion of lips, teeth, gums, hard/soft palate, tongue, tonsils, or oropharynx.    Cardiovascular:  No pedal edema; Radial Pulses +2     Neck & Lymphatics:  No cervical lymphadenopathy, no neck mass/crepitus/ asymmetry, trachea is midline, moderate tenderness over right thyroid lobe    Psych: Oriented x3,  Alert with normal mood and affect.     Respiration/Chest:  Symmetric expansion during respiration, normal respiratory effort.    Skin:  Warm and intact. No ulcerations of face, scalp, neck.  PET/CT July 12,  2017    ULTRASOUND:  none    PATHOLOGY:  FINAL PATHOLOGIC DIAGNOSIS  Right thyroid, FNA:  [place] [facility] Thyroid Cytology Category: Malignant, see note.  Note: The specimen consists of moderately cellular smears showing groups of atypical cells with powdery nuclear  chromatin and occasional prominent nucleoli. Some groups show a papillary like architecture. While a papillary  thyroid carcinoma is in the differential, the classic nuclear features are not readily evident and other malignancies  cannot be entirely ruled out. Follow up recommended as clinically indicated.  [facility]  Diagnosed by: Mendoza Melendez M.D.  (Electronically Signed: 2017-07-21 15:08:33)  Assessment & Plan:   Josey was seen today for thyroid mass.    Diagnoses and all orders for this visit:    Malignant neoplasm of thyroid gland  -     MRI Orbit Face Neck W WO Cont; Future  -     TSH; Future  -     T4, free; Future  -     PTH, intact; Future  -     Case Request Operating Room: THYROIDECTOMY    Malignant neoplasm of overlapping sites of right breast in female, estrogen receptor positive        [place] 5 and 6 represent nodules suspicious for malignancy or confirmed malignancy with over 90% being found to have malignancy on surgical pathology (Journal of Otolaryngology - Head & Neck Surgery 2013, 42:61).  At least hemithyroidectomy should be considered for [place], if not total thyroidectomy.  Total thyroidectomy is recommended for all cases of FNA confirmed malignancy. I discussed with her that it is possible that this is a metastatic breast CA, but it is more likely that this is a second primary malignancy.  She has not had dedicated imaging of the neck.  I have recommended an MRI with gadolinium of the neck as I am hopeful that this will be a well differentiated thyroid cancer and would she may need radioactive iodine postoperatively, so I will avoid iodine.  She needs a total thyroidectomy +/- central neck dissection +/- lateral neck  dissection.  Lateral neck dissection (levels 2-5) would only be performed if there is significant lymphadenopathy in the lateral compartment on imaging.  Routine prophylactic CND is generally not recommended at the time of initial surgery for PTC. Thyroidectomy alone is appropriate for small (T1 or T2), noninvasive, and cN0 PTC. A pCND might be considered for larger (T3 or T4) tumors or in cases with N1b disease.  However, for clinically evident disease it is recommended.  Furthermore, we do not have a firm confirmation of the type of thyroid cancer that is present- differentiated vs poorly differentiated, metastatic breast ca.     I explained the risks of thyroidectomy include, but are not limited to, infection, bleeding, scarring, failure to achieve the diagnosis, no evidence of cancer, recurrence, collection of blood or tissue fluid requiring drainage, injury to the recurrent laryngeal nerve with resultant temporary or permanent hoarseness (1% permanent risk with up to 10% temporary risk, greater in revision operations), injury to the superior laryngeal nerve with resultant loss of the upper register for singing or challenges with yelling, temporary or permanent hypocalcemia related to injury or devascularization of the parathyroid glands (less than 5% permanent, up to 30-60% when paratracheal dissection is accomplished, again greater in revision operations), and the need for additional procedures or therapies. Time was allowed for questions, and all questions were answered to the patient's apparent satisfaction. The risks of paratracheal lymph node dissection are included above. Informed consent was obtained.    she is aware that, if malignancy is detected, then she may require additional therapy.

## 2017-07-31 ENCOUNTER — TELEPHONE (OUTPATIENT)
Dept: RADIOLOGY | Facility: HOSPITAL | Age: 47
End: 2017-07-31

## 2017-08-01 ENCOUNTER — HOSPITAL ENCOUNTER (OUTPATIENT)
Dept: RADIOLOGY | Facility: HOSPITAL | Age: 47
Discharge: HOME | End: 2017-08-01
Attending: OTOLARYNGOLOGY
Payer: MEDICAID

## 2017-08-01 DIAGNOSIS — C73 MALIGNANT NEOPLASM OF THYROID GLAND: ICD-10-CM

## 2017-08-01 PROCEDURE — 70543 MRI ORBT/FAC/NCK W/O &W/DYE: CPT | Mod: TC,PO

## 2017-08-01 PROCEDURE — 25500020 PHARM REV CODE 255: Mod: PO | Performed by: OTOLARYNGOLOGY

## 2017-08-01 PROCEDURE — 70543 MRI ORBT/FAC/NCK W/O &W/DYE: CPT | Mod: 26,,, | Performed by: RADIOLOGY

## 2017-08-01 PROCEDURE — A9585 GADOBUTROL INJECTION: HCPCS | Mod: PO | Performed by: OTOLARYNGOLOGY

## 2017-08-01 RX ORDER — GADOBUTROL 604.72 MG/ML
9.5 INJECTION INTRAVENOUS
Status: COMPLETED | OUTPATIENT
Start: 2017-08-01 | End: 2017-08-01

## 2017-08-01 RX ADMIN — GADOBUTROL 9.5 ML: 604.72 INJECTION INTRAVENOUS at 08:08

## 2017-08-07 DIAGNOSIS — G89.3 NEOPLASM RELATED PAIN: ICD-10-CM

## 2017-08-07 DIAGNOSIS — Z17.0 MALIGNANT NEOPLASM OF OVERLAPPING SITES OF RIGHT BREAST IN FEMALE, ESTROGEN RECEPTOR POSITIVE: ICD-10-CM

## 2017-08-07 DIAGNOSIS — C79.51 SECONDARY CANCER OF BONE: ICD-10-CM

## 2017-08-07 DIAGNOSIS — C77.3 MALIGNANT NEOPLASM METASTATIC TO LYMPH NODE OF AXILLA: ICD-10-CM

## 2017-08-07 DIAGNOSIS — C50.811 MALIGNANT NEOPLASM OF OVERLAPPING SITES OF RIGHT BREAST IN FEMALE, ESTROGEN RECEPTOR POSITIVE: ICD-10-CM

## 2017-08-07 DIAGNOSIS — F41.9 ANXIETY: ICD-10-CM

## 2017-08-07 RX ORDER — HYDROCODONE BITARTRATE AND ACETAMINOPHEN 7.5; 325 MG/1; MG/1
1 TABLET ORAL EVERY 6 HOURS PRN
Qty: 60 TABLET | Refills: 0 | Status: SHIPPED | OUTPATIENT
Start: 2017-08-07 | End: 2017-08-21 | Stop reason: SDUPTHER

## 2017-08-09 ENCOUNTER — HOSPITAL ENCOUNTER (OUTPATIENT)
Dept: RADIOLOGY | Facility: HOSPITAL | Age: 47
Discharge: HOME | End: 2017-08-09
Attending: INTERNAL MEDICINE
Payer: MEDICAID

## 2017-08-09 ENCOUNTER — OFFICE VISIT (OUTPATIENT)
Dept: OBSTETRICS AND GYNECOLOGY | Facility: CLINIC | Age: 47
End: 2017-08-09
Payer: MEDICAID

## 2017-08-09 ENCOUNTER — PROCEDURE VISIT (OUTPATIENT)
Dept: PULMONOLOGY | Facility: CLINIC | Age: 47
End: 2017-08-09
Payer: MEDICAID

## 2017-08-09 VITALS
SYSTOLIC BLOOD PRESSURE: 132 MMHG | HEIGHT: 67 IN | BODY MASS INDEX: 33.05 KG/M2 | WEIGHT: 210.56 LBS | DIASTOLIC BLOOD PRESSURE: 82 MMHG

## 2017-08-09 DIAGNOSIS — R06.02 SOB (SHORTNESS OF BREATH): ICD-10-CM

## 2017-08-09 DIAGNOSIS — R87.613 HSIL (HIGH GRADE SQUAMOUS INTRAEPITHELIAL LESION) ON PAP SMEAR OF CERVIX: ICD-10-CM

## 2017-08-09 DIAGNOSIS — R11.0 NAUSEA: ICD-10-CM

## 2017-08-09 DIAGNOSIS — C50.811 MALIGNANT NEOPLASM OF OVERLAPPING SITES OF RIGHT BREAST IN FEMALE, ESTROGEN RECEPTOR POSITIVE: Primary | ICD-10-CM

## 2017-08-09 DIAGNOSIS — Z17.0 MALIGNANT NEOPLASM OF OVERLAPPING SITES OF RIGHT BREAST IN FEMALE, ESTROGEN RECEPTOR POSITIVE: Primary | ICD-10-CM

## 2017-08-09 DIAGNOSIS — Z12.4 SCREENING FOR CERVICAL CANCER: Primary | ICD-10-CM

## 2017-08-09 PROCEDURE — 99213 OFFICE O/P EST LOW 20 MIN: CPT | Mod: S$PBB,,, | Performed by: OBSTETRICS & GYNECOLOGY

## 2017-08-09 PROCEDURE — 71020 XR CHEST PA AND LATERAL: CPT | Mod: 26,,, | Performed by: RADIOLOGY

## 2017-08-09 PROCEDURE — 94060 EVALUATION OF WHEEZING: CPT | Mod: 26,S$PBB,, | Performed by: INTERNAL MEDICINE

## 2017-08-09 PROCEDURE — 99213 OFFICE O/P EST LOW 20 MIN: CPT | Mod: PBBFAC,25 | Performed by: OBSTETRICS & GYNECOLOGY

## 2017-08-09 PROCEDURE — 99999 PR PBB SHADOW E&M-EST. PATIENT-LVL III: CPT | Mod: PBBFAC,,, | Performed by: OBSTETRICS & GYNECOLOGY

## 2017-08-09 PROCEDURE — 88142 CYTOPATH C/V THIN LAYER: CPT

## 2017-08-09 PROCEDURE — 3008F BODY MASS INDEX DOCD: CPT | Mod: ,,, | Performed by: OBSTETRICS & GYNECOLOGY

## 2017-08-09 RX ORDER — PROCHLORPERAZINE MALEATE 5 MG
5 TABLET ORAL EVERY 6 HOURS PRN
Qty: 30 TABLET | Refills: 1 | Status: SHIPPED | OUTPATIENT
Start: 2017-08-09 | End: 2018-08-09

## 2017-08-09 RX ORDER — ONDANSETRON HYDROCHLORIDE 8 MG/1
8 TABLET, FILM COATED ORAL EVERY 12 HOURS PRN
Qty: 30 TABLET | Refills: 2 | Status: SHIPPED | OUTPATIENT
Start: 2017-08-09 | End: 2018-08-09

## 2017-08-10 ENCOUNTER — OFFICE VISIT (OUTPATIENT)
Dept: SURGERY | Facility: CLINIC | Age: 47
End: 2017-08-10
Payer: MEDICAID

## 2017-08-10 ENCOUNTER — OFFICE VISIT (OUTPATIENT)
Dept: PULMONOLOGY | Facility: CLINIC | Age: 47
End: 2017-08-10
Payer: MEDICAID

## 2017-08-10 ENCOUNTER — OFFICE VISIT (OUTPATIENT)
Dept: HEMATOLOGY/ONCOLOGY | Facility: CLINIC | Age: 47
End: 2017-08-10
Payer: MEDICAID

## 2017-08-10 ENCOUNTER — INFUSION (OUTPATIENT)
Dept: INFUSION THERAPY | Facility: HOSPITAL | Age: 47
End: 2017-08-10
Attending: INTERNAL MEDICINE
Payer: MEDICAID

## 2017-08-10 VITALS
SYSTOLIC BLOOD PRESSURE: 114 MMHG | DIASTOLIC BLOOD PRESSURE: 85 MMHG | TEMPERATURE: 98 F | HEIGHT: 67 IN | HEART RATE: 75 BPM | WEIGHT: 208.56 LBS | BODY MASS INDEX: 32.73 KG/M2

## 2017-08-10 VITALS — BODY MASS INDEX: 32.76 KG/M2 | HEIGHT: 67 IN | WEIGHT: 208.75 LBS

## 2017-08-10 VITALS
OXYGEN SATURATION: 98 % | HEART RATE: 87 BPM | TEMPERATURE: 97 F | SYSTOLIC BLOOD PRESSURE: 108 MMHG | DIASTOLIC BLOOD PRESSURE: 76 MMHG | WEIGHT: 208.75 LBS | BODY MASS INDEX: 32.7 KG/M2

## 2017-08-10 VITALS
WEIGHT: 208.31 LBS | DIASTOLIC BLOOD PRESSURE: 84 MMHG | BODY MASS INDEX: 32.7 KG/M2 | HEART RATE: 88 BPM | OXYGEN SATURATION: 99 % | HEIGHT: 67 IN | SYSTOLIC BLOOD PRESSURE: 126 MMHG

## 2017-08-10 DIAGNOSIS — F17.200 SMOKING: ICD-10-CM

## 2017-08-10 DIAGNOSIS — Z17.0 MALIGNANT NEOPLASM OF OVERLAPPING SITES OF RIGHT BREAST IN FEMALE, ESTROGEN RECEPTOR POSITIVE: Primary | ICD-10-CM

## 2017-08-10 DIAGNOSIS — L73.2 HIDRADENITIS: ICD-10-CM

## 2017-08-10 DIAGNOSIS — F32.A DEPRESSION, UNSPECIFIED DEPRESSION TYPE: ICD-10-CM

## 2017-08-10 DIAGNOSIS — C79.51 SECONDARY CANCER OF BONE: ICD-10-CM

## 2017-08-10 DIAGNOSIS — C50.811 MALIGNANT NEOPLASM OF OVERLAPPING SITES OF RIGHT BREAST IN FEMALE, ESTROGEN RECEPTOR POSITIVE: Primary | ICD-10-CM

## 2017-08-10 DIAGNOSIS — C73 THYROID CANCER: ICD-10-CM

## 2017-08-10 DIAGNOSIS — Z17.0 MALIGNANT NEOPLASM OF OVERLAPPING SITES OF RIGHT BREAST IN FEMALE, ESTROGEN RECEPTOR POSITIVE: ICD-10-CM

## 2017-08-10 DIAGNOSIS — C77.3 MALIGNANT NEOPLASM METASTATIC TO LYMPH NODE OF AXILLA: ICD-10-CM

## 2017-08-10 DIAGNOSIS — J44.9 CHRONIC OBSTRUCTIVE PULMONARY DISEASE, UNSPECIFIED COPD TYPE: Primary | ICD-10-CM

## 2017-08-10 DIAGNOSIS — Z94.5 STATUS POST SKIN GRAFT: Primary | ICD-10-CM

## 2017-08-10 DIAGNOSIS — C79.51 SECONDARY CANCER OF BONE: Primary | ICD-10-CM

## 2017-08-10 DIAGNOSIS — C50.811 MALIGNANT NEOPLASM OF OVERLAPPING SITES OF RIGHT BREAST IN FEMALE, ESTROGEN RECEPTOR POSITIVE: ICD-10-CM

## 2017-08-10 DIAGNOSIS — T14.8XXA OPEN WOUND: ICD-10-CM

## 2017-08-10 PROCEDURE — 99406 BEHAV CHNG SMOKING 3-10 MIN: CPT | Mod: S$PBB,,, | Performed by: INTERNAL MEDICINE

## 2017-08-10 PROCEDURE — 99999 PR PBB SHADOW E&M-EST. PATIENT-LVL IV: CPT | Mod: PBBFAC,,, | Performed by: INTERNAL MEDICINE

## 2017-08-10 PROCEDURE — 99999 PR PBB SHADOW E&M-EST. PATIENT-LVL III: CPT | Mod: PBBFAC,,, | Performed by: INTERNAL MEDICINE

## 2017-08-10 PROCEDURE — 96372 THER/PROPH/DIAG INJ SC/IM: CPT

## 2017-08-10 PROCEDURE — 99024 POSTOP FOLLOW-UP VISIT: CPT | Mod: ,,, | Performed by: SURGERY

## 2017-08-10 PROCEDURE — 99213 OFFICE O/P EST LOW 20 MIN: CPT | Mod: PBBFAC,25,27,PO | Performed by: INTERNAL MEDICINE

## 2017-08-10 PROCEDURE — 3008F BODY MASS INDEX DOCD: CPT | Mod: ,,, | Performed by: INTERNAL MEDICINE

## 2017-08-10 PROCEDURE — 99205 OFFICE O/P NEW HI 60 MIN: CPT | Mod: 25,S$PBB,, | Performed by: INTERNAL MEDICINE

## 2017-08-10 PROCEDURE — 99999 PR PBB SHADOW E&M-EST. PATIENT-LVL III: CPT | Mod: PBBFAC,,, | Performed by: SURGERY

## 2017-08-10 PROCEDURE — 63600175 PHARM REV CODE 636 W HCPCS: Performed by: INTERNAL MEDICINE

## 2017-08-10 PROCEDURE — 99215 OFFICE O/P EST HI 40 MIN: CPT | Mod: 25,S$PBB,, | Performed by: INTERNAL MEDICINE

## 2017-08-10 RX ORDER — VARENICLINE TARTRATE 0.5 (11)-1
KIT ORAL
Qty: 1 PACKAGE | Refills: 0 | Status: SHIPPED | OUTPATIENT
Start: 2017-08-10 | End: 2017-09-18

## 2017-08-10 RX ORDER — BUPROPION HYDROCHLORIDE 100 MG/1
100 TABLET ORAL 2 TIMES DAILY
Qty: 60 TABLET | Refills: 11 | Status: SHIPPED | OUTPATIENT
Start: 2017-08-10 | End: 2019-02-21

## 2017-08-10 RX ORDER — ALBUTEROL SULFATE 90 UG/1
2 AEROSOL, METERED RESPIRATORY (INHALATION) EVERY 6 HOURS
Qty: 1 INHALER | Refills: 12 | Status: SHIPPED | OUTPATIENT
Start: 2017-08-10 | End: 2019-05-02

## 2017-08-10 RX ORDER — VARENICLINE TARTRATE 1 MG/1
1 TABLET, FILM COATED ORAL 2 TIMES DAILY
Qty: 60 TABLET | Refills: 3 | Status: SHIPPED | OUTPATIENT
Start: 2017-08-10 | End: 2017-09-18

## 2017-08-10 RX ADMIN — DENOSUMAB 120 MG: 120 INJECTION SUBCUTANEOUS at 10:08

## 2017-08-10 NOTE — LETTER
August 10, 2017      Richard Delong MD  [address] [facility] [address]  [facility] [address]           [facility]  [address] [place] [id number]  Phone: [phone]  Fax: [phone]          Patient: Josey Flores   MR Number: [id number]   YOB: 1970   Date of Visit: 8/10/2017       Dear Dr. Richard Delong:    Thank you for referring Josey Flores to me for evaluation. Attached you will find relevant portions of my assessment and plan of care.    If you have questions, please do not hesitate to call me. I look forward to following Josey Flores along with you.    Sincerely,    Marcio Rashid MD    Enclosure  CC:  No Recipients    IIf you would like to receive this communication electronically, please contact [email or web address] or [phone] to request [employer] Link access.    Power[employer] Link is a tool which provides read-only access to select patient information with whom you have a relationship. Its easy to use and provides real time access to review your patients record including encounter summaries, notes, results, and demographic information.    If you feel you have received this communication in error or would no longer like to receive these types of communications, please e-mail [email or web address]

## 2017-08-10 NOTE — PROGRESS NOTES
Subjective:       Patient ID: Josey Flores is a 46 y.o. female.    Chief Complaint: No chief complaint on file.    HPI COPD  She presents for evaluation and treatment of COPD. The patient is currently having symptoms / an exacerbation. Current symptoms include chronic dyspnea, non-productive cough and wheezing. Symptoms have been present since several months ago and have been gradually worsening. She denies increased sputum and colored sputum. Associated symptoms include fatigue, poor exercise tolerance and shortness of breath.  This episode appears to have been triggered by smoke. Treatments tried for the current exacerbation: none. The patient has been having similar episodes for approximately 6 months.   She uses 1 pillows at night. Patient currently is not on home oxygen therapy.. The patient is having no constitutional symptoms, denying fever, chills, anorexia, or weight loss. The patient has not been hospitalized for this condition before. She currently smokes 1 packs per day. The patient is experiencing exercise intolerance (difficulty climbing 3 flights of stairs).    Niece with asthma  Mother with asthma and Chronic Obstructive Pulmonary Disease      Past Medical History:   Diagnosis Date    Cancer     metastatic-lymph nodes, bone, and thyroid      Past Surgical History:   Procedure Laterality Date    ABSCESS DRAINAGE      bilateral axilla    ADENOIDECTOMY      APPENDECTOMY      CHOLECYSTECTOMY      SKIN GRAFT  06/16/2017    TONSILLECTOMY       Social History     Social History    Marital status:      Spouse name: N/A    Number of children: N/A    Years of education: N/A     Occupational History    Not on file.     Social History Main Topics    Smoking status: Former Smoker     Packs/day: 1.00     Types: Cigarettes     Quit date: 6/11/2017    Smokeless tobacco: Never Used      Comment: no smoking after mn prior to surgery    Alcohol use No    Drug use: No    Sexual activity: Yes      Partners: Male     Other Topics Concern    Not on file     Social History Narrative    No narrative on file     Review of Systems   Constitutional: Positive for fatigue. Negative for fever.   HENT: Positive for postnasal drip, rhinorrhea and congestion.    Eyes: Negative for redness and itching.   Respiratory: Positive for cough, sputum production, shortness of breath, dyspnea on extertion, use of rescue inhaler and Paroxysmal Nocturnal Dyspnea.    Cardiovascular: Negative for chest pain, palpitations and leg swelling.   Genitourinary: Negative for difficulty urinating and hematuria.   Endocrine: Negative for cold intolerance and heat intolerance.    Skin: Negative for rash.   Gastrointestinal: Negative for nausea and abdominal pain.   Neurological: Negative for dizziness, syncope, weakness and light-headedness.   Hematological: Negative for adenopathy. Does not bruise/bleed easily.   Psychiatric/Behavioral: Positive for sleep disturbance. The patient is nervous/anxious.        Objective:      Physical Exam   Constitutional: She is oriented to person, place, and time. She appears well-developed and well-nourished.   HENT:   Head: Normocephalic and atraumatic.   Mouth/Throat: Oropharyngeal exudate present.   Eyes: Conjunctivae are normal. Pupils are equal, round, and reactive to light.   Neck: Neck supple. No JVD present. No tracheal deviation present. No thyromegaly present.   Cardiovascular: Normal rate, regular rhythm and normal heart sounds.    Pulmonary/Chest: Effort normal. No respiratory distress. She has decreased breath sounds. She has wheezes in the right lower field and the left lower field. She has no rhonchi. She has no rales. She exhibits no tenderness.   Abdominal: Soft. Bowel sounds are normal.   Musculoskeletal: Normal range of motion. She exhibits no edema.   Lymphadenopathy:     She has no cervical adenopathy.   Neurological: She is alert and oriented to person, place, and time.   Skin: Skin  is warm and dry.   Nursing note and vitals reviewed.    Personal Diagnostic Review  Chest X-Ray: I personally reviewed the films and findings are:, Hyperinflation  Pulmonary function tests:  Mild reversible obstruction  No flowsheet data found.        Patient advised of the adverse effects of cigarette smoking including increased risk of cancer and respiratory diseases. Method of smoking cessation with nicotine replacement products discussed The use of Chantrix  And/or nicotine replacement products were described to patient. Side effects including nausea, GI upset, insomnia, sleep disturbance and possible suicidal ideation discussed. Patient expressed understanding and is given educational materials. Patient appeared responsive and wished to proceed.  Smoking cessation counseling: Intensive direct physician to patient counseling of greater than 5 minutes   CPT: 41699      Assessment:       1. Chronic obstructive pulmonary disease, unspecified COPD type    2. Smoking    3. Depression, unspecified depression type        Outpatient Encounter Prescriptions as of 8/10/2017   Medication Sig Dispense Refill    alprazolam (XANAX) 0.5 MG tablet Take 1 tablet (0.5 mg total) by mouth 2 (two) times daily as needed for Insomnia or Anxiety. 60 tablet 0    citalopram (CELEXA) 20 MG tablet Take 1 tablet (20 mg total) by mouth once daily. 30 tablet 2    ergocalciferol (VITAMIN D2) 50,000 unit Cap Take 5,000 Units by mouth once daily at 6am.       fentaNYL (DURAGESIC) 50 mcg/hr Place 1 patch onto the skin every 72 hours. 10 patch 0    furosemide (LASIX) 20 MG tablet Take 1 tablet (20 mg total) by mouth once daily. 30 tablet 1    hydrocodone-acetaminophen 7.5-325mg (NORCO) 7.5-325 mg per tablet Take 1 tablet by mouth every 6 (six) hours as needed for Pain. 60 tablet 0    IBRANCE 125 mg Cap TAKE 1 CAPSULE (125 MG) DAILY FOR 21 DAYS, FOLLOWED BY 7 DAY REST PERIOD TO COMPLETE A 28 DAY TREATMENT CYCLE 21 capsule 2    letrozole  (FEMARA) 2.5 mg Tab Take 1 tablet (2.5 mg total) by mouth once daily. (Patient taking differently: Take 2.5 mg by mouth nightly. ) 30 tablet 2    multivitamin (THERAGRAN) per tablet Take 1 tablet by mouth once daily.      ondansetron (ZOFRAN) 8 MG tablet Take 1 tablet (8 mg total) by mouth every 12 (twelve) hours as needed for Nausea. 30 tablet 2    potassium chloride SA (K-DUR,KLOR-CON) 20 MEQ tablet Take 1 tablet (20 mEq total) by mouth once daily. 30 tablet 1    prochlorperazine (COMPAZINE) 5 MG tablet Take 1 tablet (5 mg total) by mouth every 6 (six) hours as needed for Nausea. 30 tablet 1    albuterol 90 mcg/actuation inhaler Inhale 2 puffs into the lungs every 6 (six) hours. 1 Inhaler 12    buPROPion (WELLBUTRIN) 100 MG tablet Take 1 tablet (100 mg total) by mouth 2 (two) times daily. 60 tablet 11    varenicline (CHANTIX STARTING MONTH BOX) 0.5 mg (11)- 1 mg (42) tablet Take one 0.5mg tab by mouth once daily X3 days,then increase to one 0.5mg tab twice daily X4 days,then increase to one 1mg tab twice daily 1 Package 0    varenicline (CHANTIX) 1 mg Tab Take 1 tablet (1 mg total) by mouth 2 (two) times daily. 60 tablet 3    [DISCONTINUED] ondansetron (ZOFRAN-ODT) 4 MG TbDL Take 1 tablet (4 mg total) by mouth every 6 (six) hours as needed. 10 tablet 0     Facility-Administered Encounter Medications as of 8/10/2017   Medication Dose Route Frequency Provider Last Rate Last Dose    [COMPLETED] denosumab (XGEVA) solution 120 mg  120 mg Subcutaneous 1 time in Clinic/HOD Richard Delong MD   120 mg at 08/10/17 1039     Orders Placed This Encounter   Procedures    Ambulatory referral to Smoking Cessation Program     Referral Priority:   Routine     Referral Type:   Consultation     Referral Reason:   Specialty Services Required     Requested Specialty:   CTTS     Number of Visits Requested:   1     Plan:       Requested Prescriptions     Signed Prescriptions Disp Refills    albuterol 90 mcg/actuation  inhaler 1 Inhaler 12     Sig: Inhale 2 puffs into the lungs every 6 (six) hours.    varenicline (CHANTIX STARTING MONTH BOX) 0.5 mg (11)- 1 mg (42) tablet 1 Package 0     Sig: Take one 0.5mg tab by mouth once daily X3 days,then increase to one 0.5mg tab twice daily X4 days,then increase to one 1mg tab twice daily    varenicline (CHANTIX) 1 mg Tab 60 tablet 3     Sig: Take 1 tablet (1 mg total) by mouth 2 (two) times daily.    buPROPion (WELLBUTRIN) 100 MG tablet 60 tablet 11     Sig: Take 1 tablet (100 mg total) by mouth 2 (two) times daily.     Chronic obstructive pulmonary disease, unspecified COPD type  -     Ambulatory referral to Smoking Cessation Program  -     albuterol 90 mcg/actuation inhaler; Inhale 2 puffs into the lungs every 6 (six) hours.  Dispense: 1 Inhaler; Refill: 12    Smoking  -     Ambulatory referral to Smoking Cessation Program  -     varenicline (CHANTIX STARTING MONTH BOX) 0.5 mg (11)- 1 mg (42) tablet; Take one 0.5mg tab by mouth once daily X3 days,then increase to one 0.5mg tab twice daily X4 days,then increase to one 1mg tab twice daily  Dispense: 1 Package; Refill: 0  -     varenicline (CHANTIX) 1 mg Tab; Take 1 tablet (1 mg total) by mouth 2 (two) times daily.  Dispense: 60 tablet; Refill: 3    Depression, unspecified depression type  -     buPROPion (WELLBUTRIN) 100 MG tablet; Take 1 tablet (100 mg total) by mouth 2 (two) times daily.  Dispense: 60 tablet; Refill: 11      Return in about 6 weeks (around 9/21/2017) for review of progress.      MEDICAL DECISION MAKING: Moderate to high complexity.  Overall, the multiple problems listed are of moderate to high severity that may impact quality of life and activities of daily living. Side effects of medications, treatment plan as well as options and alternatives reviewed and discussed with patient. There was counseling of patient concerning these issues.    Total time spent in face to face counseling and coordination of care - 60 minutes  over 50% of time was used in discussion of prognosis, risks, benefits of treatment, instructions and compliance with regimen . Discussion with other physicians or health care providers occurred.

## 2017-08-10 NOTE — PROGRESS NOTES
Subjective:       Patient ID: Josey Flores is a 46 y.o. female.    Chief Complaint:  No chief complaint on file.      History of Present Illness  HPI  here for f/u pap   Hx of leep for hsil on pap;    Has planned surgery to eval thyroid--possibility of cancer  Denies vaginal bleeding    Doing well so far with breast cancer dx; continues on chemo pill for breast cancer    GYN & OB History  No LMP recorded (lmp unknown). Patient is postmenopausal.   Date of Last Pap: No result found    OB History    Para Term  AB Living   5 2     3     SAB TAB Ectopic Multiple Live Births   3              # Outcome Date GA Lbr Bassam/2nd Weight Sex Delivery Anes PTL Lv   5 SAB            4 SAB            3 SAB            2 Para            1 Para                   Review of Systems  Review of Systems   Constitutional: Negative for activity change, appetite change, chills, diaphoresis, fatigue, fever and unexpected weight change.   HENT: Negative for mouth sores and tinnitus.    Eyes: Negative for discharge and visual disturbance.   Respiratory: Negative for cough, shortness of breath and wheezing.    Cardiovascular: Negative for chest pain, palpitations and leg swelling.   Gastrointestinal: Negative for abdominal pain, bloating, blood in stool, constipation, diarrhea, nausea and vomiting.   Endocrine: Negative for diabetes, hair loss, hot flashes, hyperthyroidism and hypothyroidism.   Genitourinary: Negative for decreased libido, dyspareunia, dysuria, flank pain, frequency, genital sores, hematuria, menorrhagia, menstrual problem, pelvic pain, urgency, vaginal bleeding, vaginal discharge, vaginal pain, dysmenorrhea, urinary incontinence, postcoital bleeding, postmenopausal bleeding and vaginal odor.   Musculoskeletal: Negative for back pain and myalgias.   Skin:  Negative for rash, no acne and hair changes.   Neurological: Negative for seizures, syncope, numbness and headaches.   Hematological: Negative for adenopathy.  Does not bruise/bleed easily.   Psychiatric/Behavioral: Negative for depression and sleep disturbance. The patient is not nervous/anxious.    Breast: Negative for breast mass, breast pain, nipple discharge and skin changes          Objective:    Physical Exam:   Constitutional: She appears well-developed.     Eyes: Conjunctivae and EOM are normal. Pupils are equal, round, and reactive to light.    Neck: Normal range of motion. Neck supple.     Pulmonary/Chest: Effort normal. Right breast exhibits no mass, no nipple discharge, no skin change, no tenderness and presence. Left breast exhibits no mass, no nipple discharge, no skin change, no tenderness and presence. Breasts are symmetrical.        Abdominal: Soft.     Genitourinary: Vagina normal and uterus normal. Pelvic exam was performed with patient supine.   Genitourinary Comments: atrophic           Musculoskeletal: Normal range of motion.       Neurological: She is alert.    Skin: Skin is warm.    Psychiatric: She has a normal mood and affect.          Assessment:        1. Screening for cervical cancer    2. HSIL (high grade squamous intraepithelial lesion) on Pap smear of cervix               Plan:      Pap obtained; f/u as indicated by pap  Ok to proceed with thyroid surgery from gyn perspective

## 2017-08-10 NOTE — PATIENT INSTRUCTIONS
I agree with you and her  you just stretch things out and pulled the tissue apart.  This should heal in over time.  Please use an antibiotic ointment and a Band-Aid twice a day.    If it hasn't healed up and about a month or so please let us know

## 2017-08-10 NOTE — PATIENT INSTRUCTIONS
[facility]  [address]  [phone] phone     [phone] fax  Hours of Operation: Monday- Friday 8:00am- 5:00pm  After hours phone  [phone]  Hematology / Oncology Physicians on call      Dr. Jake Keith                        Please call with any concerns regarding your appointment today.

## 2017-08-10 NOTE — PROGRESS NOTES
Subjective:       Patient ID: Josey Flores is a 46 y.o. female.    Had his post excision of hidradenitis and split-thickness skin graft to the axilla, small opening in the right armpit    Chief Complaint: Post-op Evaluation (wound check)    She is scheduled to undergo a thyroidectomy by the ENT service.  There is a small opening in her right axillary skin graft site.  She follows up for surgery for this      Review of Systems   Constitutional: Negative.    HENT:        Right neck mass`   Skin:        small opening in the right axilla skin graft site       Objective:      Physical Exam   Constitutional: She appears well-developed and well-nourished. No distress.   Neck:   Is easily palpable mass of the right neck   Cardiovascular: Regular rhythm.    Skin:   The right axilla area was examined.  There is a 1 cm by half same and are opening where the skin graft meets the skin.    Silver nitrate was applied as was antibiotic ointment and a Band-Aid   Vitals reviewed.      Assessment:     all skin opening where skin graft meets the skin, not unusual    Plan:      wound care with antibiotic ointment and a Band-Aid.  Should re-epithelialized.  Follow-up as needed

## 2017-08-10 NOTE — PATIENT INSTRUCTIONS
Albuterol inhalation aerosol  What is this medicine?  ALBUTEROL (al BYOO ter ole) is a bronchodilator. It helps open up the airways in your lungs to make it easier to breathe. This medicine is used to treat and to prevent bronchospasm.  How should I use this medicine?  This medicine is for inhalation through the mouth. Follow the directions on your prescription label. Take your medicine at regular intervals. Do not use more often than directed. Make sure that you are using your inhaler correctly. Ask you doctor or health care provider if you have any questions.  Talk to your pediatrician regarding the use of this medicine in children. Special care may be needed.  What side effects may I notice from receiving this medicine?  Side effects that you should report to your doctor or health care professional as soon as possible:  · allergic reactions like skin rash, itching or hives, swelling of the face, lips, or tongue  · breathing problems  · chest pain  · feeling faint or lightheaded, falls  · high blood pressure  · irregular heartbeat  · fever  · muscle cramps or weakness  · pain, tingling, numbness in the hands or feet  · vomiting  Side effects that usually do not require medical attention (report to your doctor or health care professional if they continue or are bothersome):  · cough  · difficulty sleeping  · headache  · nervousness or trembling  · stomach upset  · stuffy or runny nose  · throat irritation  · unusual taste  What may interact with this medicine?  · anti-infectives like chloroquine and pentamidine  · caffeine  · cisapride  · diuretics  · medicines for colds  · medicines for depression or for emotional or psychotic conditions  · medicines for weight loss including some herbal products  · methadone  · some antibiotics like clarithromycin, erythromycin, levofloxacin, and linezolid  · some heart medicines  · steroid hormones like dexamethasone, cortisone, hydrocortisone  · theophylline  · thyroid  hormones  What if I miss a dose?  If you miss a dose, use it as soon as you can. If it is almost time for your next dose, use only that dose. Do not use double or extra doses.  Where should I keep my medicine?  Keep out of the reach of children.  Store at room temperature between 15 and 30 degrees C (59 and 86 degrees F). The contents are under pressure and may burst when exposed to heat or flame. Do not freeze. This medicine does not work as well if it is too cold. Throw away any unused medicine after the expiration date. Inhalers need to be thrown away after the labeled number of puffs have been used or by the expiration date; whichever comes first. Ventolin HFA should be thrown away 12 months after removing from foil pouch. Check the instructions that come with your medicine.  What should I tell my health care provider before I take this medicine?  They need to know if you have any of the following conditions:  · diabetes  · heart disease or irregular heartbeat  · high blood pressure  · pheochromocytoma  · seizures  · thyroid disease  · an unusual or allergic reaction to albuterol, levalbuterol, sulfites, other medicines, foods, dyes, or preservatives  · pregnant or trying to get pregnant  · breast-feeding  What should I watch for while using this medicine?  Tell your doctor or health care professional if your symptoms do not improve. Do not use extra albuterol. If your asthma or bronchitis gets worse while you are using this medicine, call your doctor right away.  If your mouth gets dry try chewing sugarless gum or sucking hard candy. Drink water as directed.  Date Last Reviewed:   NOTE:This sheet is a summary. It may not cover all possible information. If you have questions about this medicine, talk to your doctor, pharmacist, or health care provider. Copyright© 2016 [employer]        Step-by-Step  Using an Inhaler (in Mouth) Without a Spacer    Date Last Reviewed: 5/29/2015  © 5726-2812 [employer]. [address] [place] [address], [place] [address]. All rights reserved. This information is not intended as a substitute for professional medical care. Always follow your healthcare professional's instructions.        Varenicline oral tablets  What is this medicine?  VARENICLINE (karan EN i kleen) is used to help people quit smoking. It can reduce the symptoms caused by stopping smoking. It is used with a patient support program recommended by your physician.  How should I use this medicine?  Take this medicine by mouth after eating. Take with a full glass of water. Follow the directions on the prescription label. Take your doses at regular intervals. Do not take your medicine more often than directed.  There are 3 ways you can use this medicine to help you quit smoking; talk to your health care professional to decide which plan is right for you:  1) you can choose a quit date and start this medicine 1 week before the quit date, or,  2) you can start taking this medicine before you choose a quit date, and then pick a quit date between day 8 and 35 days of treatment, or,  3) if you are not sure that you are able or willing to quit smoking right away, start taking this medicine and slowly decrease the amount you smoke as directed by your health care professional with the goal of being cigarette-free by week 12 of treatment.  Stick to your plan; ask about support groups or other ways to help you remain cigarette-free. If you are motivated to quit smoking and did not succeed during a previous attempt with this medicine for reasons other than side effects, or if you returned to smoking after this treatment, speak with your health care professional about whether another course of this medicine may be right for you.  A special MedGuide will be given to you by the pharmacist with each prescription and refill. Be sure to read this information carefully each time.  Talk to your pediatrician regarding the use of this  medicine in children. This medicine is not approved for use in children.  What side effects may I notice from receiving this medicine?  Side effects that you should report to your doctor or health care professional as soon as possible:  · allergic reactions like skin rash, itching or hives, swelling of the face, lips, tongue, or throat  · acting aggressive, being angry or violent, or acting on dangerous impulses  · breathing problems  · changes in vision  · chest pain or chest tightness  · confusion, trouble speaking or understanding  · new or worsening depression, anxiety, or panic attacks  · extreme increase in activity and talking (glynn)  · fast, irregular heartbeat  · feeling faint or lightheaded, falls  · fever  · pain in legs when walking  · problems with balance, talking, walking  · redness, blistering, peeling or loosening of the skin, including inside the mouth  · ringing in ears  · seeing or hearing things that aren't there (hallucinations)  · seizures  · sleepwalking  · sudden numbness or weakness of the face, arm or leg  · thoughts about suicide or dying, or attempts to commit suicide  · trouble passing urine or change in the amount of urine  · unusual bleeding or bruising  · unusually weak or tired  Side effects that usually do not require medical attention (report to your doctor or health care professional if they continue or are bothersome):  · constipation  · headache  · nausea, vomiting  · strange dreams  · stomach gas  · trouble sleeping  What may interact with this medicine?  · alcohol or any product that contains alcohol  · insulin  · other stop smoking aids  · theophylline  · warfarin  What if I miss a dose?  If you miss a dose, take it as soon as you can. If it is almost time for your next dose, take only that dose. Do not take double or extra doses.  Where should I keep my medicine?  Keep out of the reach of children.  Store at room temperature between 15 and 30 degrees C (59 and 86 degrees  F). Throw away any unused medicine after the expiration date.  What should I tell my health care provider before I take this medicine?  They need to know if you have any of these conditions:  · bipolar disorder, depression, schizophrenia or other mental illness  · heart disease  · if you often drink alcohol  · kidney disease  · peripheral vascular disease  · seizures  · stroke  · suicidal thoughts, plans, or attempt; a previous suicide attempt by you or a family member  · an unusual or allergic reaction to varenicline, other medicines, foods, dyes, or preservatives  · pregnant or trying to get pregnant  · breast-feeding  What should I watch for while using this medicine?  Visit your doctor or health care professional for regular check ups. Ask for ongoing advice and encouragement from your doctor or healthcare professional, friends, and family to help you quit. If you smoke while on this medication, quit again  Your mouth may get dry. Chewing sugarless gum or sucking hard candy, and drinking plenty of water may help. Contact your doctor if the problem does not go away or is severe.  You may get drowsy or dizzy. Do not drive, use machinery, or do anything that needs mental alertness until you know how this medicine affects you. Do not stand or sit up quickly, especially if you are an older patient. This reduces the risk of dizzy or fainting spells.  Sleepwalking can happen during treatment with this medicine, and can sometimes lead to behavior that is harmful to you, other people, or property. Stop taking this medicine and tell your doctor if you start sleepwalking or have other unusual sleep-related activity.  Decrease the amount of alcoholic beverages that you drink during treatment with this medicine until you know if this medicine affects your ability to tolerate alcohol. Some people have experienced increased drunkenness (intoxication), unusual or sometimes aggressive behavior, or no memory of things that have  happened (amnesia) during treatment with this medicine.  The use of this medicine may increase the chance of suicidal thoughts or actions. Pay special attention to how you are responding while on this medicine. Any worsening of mood, or thoughts of suicide or dying should be reported to your health care professional right away.  Date Last Reviewed:   NOTE:This sheet is a summary. It may not cover all possible information. If you have questions about this medicine, talk to your doctor, pharmacist, or health care provider. Copyright© 2016 [employer] Standard

## 2017-08-10 NOTE — LETTER
August 10, 2017      Richard Delong MD  [address] [facility] [address]           [facility]  [address] [place] [id number]  Phone: [phone]  Fax: [phone]          Patient: Josey Flores   MR Number: [id number]   YOB: 1970   Date of Visit: 8/10/2017       Dear Dr. Richard Delong:    Thank you for referring Josey Flores to me for evaluation. Attached you will find relevant portions of my assessment and plan of care.    If you have questions, please do not hesitate to call me. I look forward to following Josey Flores along with you.    Sincerely,    Marcio Rashid MD    Enclosure  CC:  No Recipients    If you would like to receive this communication electronically, please contact [email or web address] or [phone] to request more information on [employer] access.    For providers and/or their staff who would like to refer a patient to [facility], please contact us through our one-stop-shop provider referral line, [facility], at [phone].    If you feel you have received this communication in error or would no longer like to receive these types of communications, please e-mail [email or web address]

## 2017-08-14 ENCOUNTER — OFFICE VISIT (OUTPATIENT)
Dept: OTOLARYNGOLOGY | Facility: CLINIC | Age: 47
End: 2017-08-14
Payer: MEDICAID

## 2017-08-14 VITALS
BODY MASS INDEX: 32.87 KG/M2 | SYSTOLIC BLOOD PRESSURE: 111 MMHG | WEIGHT: 209.88 LBS | DIASTOLIC BLOOD PRESSURE: 72 MMHG | TEMPERATURE: 98 F | HEART RATE: 92 BPM

## 2017-08-14 DIAGNOSIS — C73 MALIGNANT NEOPLASM OF THYROID GLAND: Primary | ICD-10-CM

## 2017-08-14 PROCEDURE — 31575 DIAGNOSTIC LARYNGOSCOPY: CPT | Mod: S$PBB,,, | Performed by: OTOLARYNGOLOGY

## 2017-08-14 PROCEDURE — 99214 OFFICE O/P EST MOD 30 MIN: CPT | Mod: 25,S$PBB,, | Performed by: OTOLARYNGOLOGY

## 2017-08-14 PROCEDURE — 31575 DIAGNOSTIC LARYNGOSCOPY: CPT | Mod: PBBFAC,PO | Performed by: OTOLARYNGOLOGY

## 2017-08-14 PROCEDURE — 99999 PR PBB SHADOW E&M-EST. PATIENT-LVL III: CPT | Mod: PBBFAC,,, | Performed by: OTOLARYNGOLOGY

## 2017-08-14 PROCEDURE — 99213 OFFICE O/P EST LOW 20 MIN: CPT | Mod: PBBFAC,PO | Performed by: OTOLARYNGOLOGY

## 2017-08-14 PROCEDURE — 3008F BODY MASS INDEX DOCD: CPT | Mod: ,,, | Performed by: OTOLARYNGOLOGY

## 2017-08-14 NOTE — PATIENT INSTRUCTIONS
Thyroid Gland:  Your thyroid gland is in the front lower part of your neck. It makes hormones that help make your body work right. Your thyroid gland has 2 sections. Each section has parathyroid glands.    Thyroidectomy:  Thyroid nodules (round hard lump of cells) are common. If a nodule has cancer in it, then usually the entire thyroid gland is removed, but rarely just half of the gland is removed.  The reason for removing the whole gland rather than just half of the gland is that it lets doctors monitor your blood tests in the future to see if any thyroid tissue is present, suggesting a recurrence.  This is not possible if half of the gland is remaining.  Also, if you need radioactive iodine treatments, there cannot be any remaining thyroid tissue present so that the medicine goes to any microscopic disease in lymph nodes or other parts of your body that may not be detectable before cancer. Some people have a big thyroid that causes problems swallowing or breathing. This is called a goiter and is not cancer.If you have a goiter you may need surgery to take it out.     Dr. Baugh makes an incision (cut) in the lower area of your neck. The exact size of the cut varies, but is usually about 1 1/2 to 2 inches.  He then carefully cuts out the thyroid lobe(s.)  He will find your vocal cord nerve and work around it. There are tiny glands called parathyroid glands that are carefully cut away from the gland and left in the neck.  There are 2 of these glands on each side, and they control the amount of calcium in your blood  If your whole thyroid needs to be taken out, the same process is carried out on the other side.     Your vocal cord is usually not harmed by the surgery. Your voice may be hoarse or weak after surgery. About 10% of patients have vocal cord immobility one one side, but this is permanent in only about 1-5% of cases We will check your vocal cords at your post-operative visit so try not to worry about it  until then.     For patients who have the entire thyroid gland removed (both sides) parathyroid glands may not work as well as they should after surgery. For patients just having one side removed, it is extremely rare for this to be a problem.  If your parathyroid glands are not working well, this can cause your calcium blood levels to drop too low. This can be life-threatening.  This may be a problem for a short time, or it may be long lasting. Up to 30% of patients who have total thyroidectomies will have temporary problems with their parathyroid glands.  Less than 5% of patients have permanent parathyroid problems after thyroid surgery unless there is a more extensive cancer surgery performed at the same time called a central neck dissection.    If you have just one half of your thyroid removed (thyroid lobectomy, or hemithyroidectomy) then you may go home the same day of surgery. You will need someone to drive you home.      You will stay in the hospital at least 1 night if you had to have your whole thyroid taken out (total thyroidectomy). Your blood calcium levels will be checked every 6 hours. Also your parathyroid hormone levels will be checked.  If they are stable, you can go home the next day.  Dr. Baugh usually sends patients home on calcium and vitamin D supplementation.  The easiest way to get the right dose and swallow the pills easily is to buy over-the-counter TUMS ULTRA 1000mg chewable tablets.  He will put on your discharge paperwork the exact amount to take.        If you have had your whole thyroid taken out, you will have to take a thyroid hormone pill every day for the rest of your life. Discuss this with Dr. Baugh at your first postoperative visit.  The exact dose of the med may need to be adjusted over time. We will ask your primary care doctor or endocrinologist (a doctor that treats diseases that affect your glands) to check blood tests 6 weeks after starting your thyroid hormone. Your dose  of the thyroid hormone med will be adjusted as needed. Sometimes, your endocrinologist does not want you to start taking this hormone until the tests come back showing you do not have cancer. Your surgeon will let you know.    Post Operative Instructions    Head of Bed:  Please raise the head of your bed 30-45 degrees or sleep in a recliner for the first 3-4 days to decrease swelling. The skin above the incision may look swollen after lying down for a few hours.    Activity:  No straining, heavy lifting, or vigorous exercise for 2 weeks after surgery.  Most patients are able to return to work 1 week after surgery unless their job requires the above activities.    Diet:  You may eat your regular diet after surgery.    Pain:    Your pain can be mild to moderate the first 24 - 48 hours. The pain usually lessens after that. Many patients complain more about a sore throat from the breathing tube used during surgery then about pain from the surgery itself. Your pain will get better in 1-2 days and is best treated with throat lozenges.     You may not need strong narcotic pain medication. The sooner you reduce your narcotic pain medication use, the faster you will heal. As your pain lessens, try using extra-strength acetaminophen (Tylenol) instead of your narcotic med. It is best to reduce your pain to a level you can manage, rather than to get rid of the pain completely.  Please start at a lower of narcotic pain med, and increase the dose only if the pain remains uncontrolled. Decrease the dose if the side effects are too severe.   Do not drive, operate dangerous machinery, or do anything dangerous if you are taking narcotic pain medication (such as oxycodone, hydrocodone, morphine, etc.) This medication affects your reflexes and responses, just like alcohol.     When to Call Your Surgeon:  If you have    1. Any concerns. We would much rather that you call your surgeon then worry at home, or get into trouble.    2. Any  numbness or tingling around your mouth or in both of your fingers or toes. This may be a sign of low blood calcium levels.  If you experience this, take 2000mg of TUMS ULTRA (2 tabs) with a glass of milk.  If your symptoms last for 45 minutes then you should have someone drive you to the [facility] room.  . If you have muscle cramping and or curling of your fingers or toes, this could be even more seriously low blood calcium levels. THIS CAN BE A LIFE-THREATENING PROBLEM. If you experience this, take 2000mg of TUMS ULTRA (2 tabs) with a glass of milk then have someone drive you to the [facility] room. You must go have your blood calcium levels drawn immediately.  If you live too far away, go to a nearby ER. Have the ER staff call your Dr. Baugh after drawing your blood calcium and giving you extra calcium if needed. Bring these postoperative instructions with you to show to them. If your blood calcium gets too low, you could have seizures or your heart could stop, so you must take this seriously!     3. Fever over 101.5 degrees F.     4. Foul smelling discharge from your incision.    5. Large amount of bleeding.    6. More than expected swelling of your neck.    7. Increase warmth or redness around the incision.     8. Problems urinating.    9. Pain that continues to increase instead of decrease.    Wound Care:  DERMABOND® PRINEO® Skin Closure System    Dr. Baugh will use DERMABOND PRINEO System to close your wound.  It is extremely rare that stitches are placed that need to be removed.  Dr. Baugh will let you know if you are an exception to this, but in the majority of cases, the stitches are placed beneath the skin and are absorbable.   DERMABOND PRINEO System is the combination of a mesh and a liquid adhesive  that allows the incision or wound to be held together during the healing process.   DERMABOND PRINEO System should remain in place anywhere between 7-14 days. In most cases, DERMABOND PRINEO  System is easily removed with little or no discomfort.   In the event that you notice that DERMABOND PRINEO System is beginning to  loosen or may be coming off earlier, simply trim any loose edges   The following information is provided to help you understand how to care for your  incision and is based on the FDA-cleared product labeling. You should always follow  the instructions of your healthcare provider.    After DERMABOND® PRINEO® Skin Closure System has been applied  Caring for your wound  Things to know  · Bathing or showering  · You may bathe or shower the same day as surgery unless directed otherwise by Dr. Baugh.   Do not soak or scrub your incision or wound.  After showering or bathing, gently blot your incision or wound dry with a soft towel.  · If you experience any redness, swelling, discomfort, warmth or pus, contact Dr. Baugh and he or she will determine how your incision or wound is healing and take the necessary steps to address any issues.  · Exercise  · Do not engage in strenuous exercise that may cause additional stress on your incision or wound for 2 weeks after surgery.  Light exercise such as going for a walk is recommended as it reduces the chances of clots in the legs or pneumonia.     Removing DERMABOND PRINEO System   If the DERMABOND PRINEO System has not come off after 14 days, you may carefully peel off the DERMABOND PRINEO System.   Prior to removal, do not scratch, rub or pick at the mesh. This may  loosen the adhesive and mesh before the skin is healed.      Ointments or liquids  Topical ointments, liquids or any other product (other than dry  bandages) should not be applied to the incision while DERMABOND  PRINEO System is in place. These may loosen DERMABOND PRINEO  System from the skin before it has completely healed.

## 2017-08-14 NOTE — PROGRESS NOTES
Referring Provider:    No referring provider defined for this encounter.  Subjective:   Patient: Josey Flores [id number], :1970   Visit date:2017 9:26 PM    Chief Complaint:  Follow-up (review labs and MRI )    HPI:  Josey is a 46 y.o. female who I was asked to see in consultation for evaluation of the following issue(s):    COMPRESSIVE SYMPTOMS OR MINOR RISK FACTORS FOR THYROID CANCER:  Increasing in size over the past 6 months:  Yes  Dysphagia: No  Dyspnea on exertion:  No  Orthopnea:  No  Hemoptysis:  No  Voice changes:  No  Pain:  Yes  AGE >45  Yes   FEMALE Yes    HIGH RISK HISTORY FOR THYROID CANCER:  Thyroid cancer in 1 or more first degree relatives:  No  History of radiation:  No  Prior thyroidectomy with dx of thyroid cancer:  No  PET positive nodule:  Yes  Multiple Endocrine Neoplasia:  No  Familial Medullary Thyroid Cancer:  No    (2009 REVISED [employer]ICAN [employer]IATION MANAGEMENT GUIDELINES FOR PATIENTS WITH THYROID NODULES AND DIFFERENTIATED THYROID CANCER)      TSH:  normal    She has metastatic breast cancer.  She has had multiple operations for this as well as adjuvant therapy.  Her most recent PET demonstrated excellent response to therapy with no FDG avid lesions except for the thyroid.    Review of Systems:  Negative unless checked off.  Gen:  []fever   [x]fatigue  HENT:  []nosebleeds  []dental problem   Eyes:  []photophobia  []visual disturbance  Resp:  []chest tightness []wheezing  Card:  []chest pain  []leg swelling  GI:  []abdominal pain []blood in stool  :  []dysuria  []hematuria  Musc:  []joint swelling  []gait problem  Skin:  []color change  []pallor  Neuro:  []seizures  []numbness  Hem:  [x]bruise/bleed easily  Psych:  []hallucinations  [x]behavioral problems- anxiety, depression  Allergy/Imm: is allergic to vancomycin analogues.    Her meds, allergies, medical, surgical, social & family histories were reviewed & updated:  -     She has a current medication list which  includes the following prescription(s): albuterol, alprazolam, bupropion, citalopram, ergocalciferol, fentanyl, furosemide, hydrocodone-acetaminophen 7.5-325mg, ibrance, letrozole, multivitamin, ondansetron, potassium chloride sa, prochlorperazine, varenicline, and varenicline.  -     She  has a past medical history of Cancer.   -     She  does not have any pertinent problems on file.   -     She  has a past surgical history that includes Cholecystectomy; Tonsillectomy; Adenoidectomy; Appendectomy; Abscess drainage; and Skin graft (06/16/2017).  -     She  reports that she quit smoking about 2 months ago. Her smoking use included Cigarettes. She smoked 1.00 pack per day. She has never used smokeless tobacco. She reports that she does not drink alcohol or use drugs.  -     Her family history is not on file.  -     She is allergic to vancomycin analogues.    Objective:   Physical Exam:  Vitals:  /72   Pulse 92   Temp 98.3 °F (36.8 °C) (Tympanic)   Wt 95.2 kg (209 lb 14.1 oz)   LMP  (LMP Unknown)   BMI 32.87 kg/m²   General appearance:  Well developed, well nourished    Eyes:  Extraocular motions intact, PERRL    Communication:  no hoarseness, no dysphonia    Ears:  Otoscopy of external auditory canals and tympanic membranes was normal, clinical speech reception thresholds grossly intact, no mass/lesion of auricle.  Nose:  No masses/lesions of external nose, nasal mucosa, septum, and turbinates were within normal limits.  Mouth:  No mass/lesion of lips, teeth, gums, hard/soft palate, tongue, tonsils, or oropharynx.    Cardiovascular:  No pedal edema; Radial Pulses +2     Neck & Lymphatics:  No cervical lymphadenopathy, no neck mass/crepitus/ asymmetry, trachea is midline, moderate tenderness over right thyroid lobe with palpable nodule    Psych: Oriented x3,  Alert with normal mood and affect.     Respiration/Chest:  Symmetric expansion during respiration, normal respiratory effort.    Skin:  Warm and  intact. No ulcerations of face, scalp, neck.    PET/CT July 12, 2017    Comparison: PET CT dated 07/12/2017    Technique: Multiplanar/multisequence MRI of the neck was performed with and without contrast.  9.5 cc IV Gadavist was administered.    Findings:     There is a lobular heterogeneous signal nodule within the midportion of the right thyroid lobe measuring approximately 2.2 x 1.8 x 2.7 cm.  Lesion demonstrates peripheral T2 and T1 hyperintense signal with low signal centrally.  There is some suspected low level central enhancement.  No definite evidence of extracapsular or perineural invasion.    No cervical adenopathy demonstrated by size criteria.    Bilateral parotid and submandibular glands appear within normal limits.  Nasopharynx, oropharynx, hypopharynx, and larynx appear within normal limits.  No mucosal or submucosal soft tissue masses or fluid collections visualized.    Cervical flow voids appear patent with no evidence of vascular occlusion or encasement.    Focal T2 hyperintense focus noted within the manubrium on the left which correlates with the known lytic metastatic focus seen on prior PET/CT.   Impression       Right thyroid nodule as above, in keeping with known biopsy proven malignancy.  No definite evidence of extracapsular invasion or suspicious cervical adenopathy.    Electronically signed by: THANH SANZ MD  Date: 08/01/17  Time: 10:53          PATHOLOGY:  FINAL PATHOLOGIC DIAGNOSIS  Right thyroid, FNA:  [facility] Thyroid Cytology Category: Malignant, see note.  Note: The specimen consists of moderately cellular smears showing groups of atypical cells with powdery nuclear  chromatin and occasional prominent nucleoli. Some groups show a papillary like architecture. While a papillary  thyroid carcinoma is in the differential, the classic nuclear features are not readily evident and other malignancies  cannot be entirely ruled out. Follow up recommended as clinically  indicated.  [facility]  Diagnosed by: Mendoza Melendez M.D.  (Electronically Signed: 2017-07-21 15:08:33)  Assessment & Plan:   Josey was seen today for follow-up.    Diagnoses and all orders for this visit:    Malignant neoplasm of thyroid gland  -     Ambulatory Referral to Endocrinology        Louin 5 and 6 represent nodules suspicious for malignancy or confirmed malignancy with over 90% being found to have malignancy on surgical pathology (Journal of Otolaryngology - Head & Neck Surgery 2013, 42:61).  At least hemithyroidectomy should be considered for Louin 5, if not total thyroidectomy.  Total thyroidectomy is recommended for all cases of FNA confirmed malignancy. I discussed with her that it is possible that this is a metastatic breast CA, but it is more likely that this is a second primary malignancy.   She needs a total thyroidectomy without lymph node dissection.    Lateral neck dissection (levels 2-5) would only be performed if there is significant lymphadenopathy in the lateral compartment on imaging.  Routine prophylactic CND is generally not recommended at the time of initial surgery for PTC. Thyroidectomy alone is appropriate for small (T1 or T2), noninvasive, and cN0 PTC. A pCND might be considered for larger (T3 or T4) tumors.  However, for clinically evident disease it is recommended.  Her MRI does not show any evidence of pathologic lymphadenopathy.  Finally, we do not have a firm confirmation of the type of thyroid cancer that is present- differentiated vs poorly differentiated, metastatic breast ca, but based on FNA, PTC is by far the most likely diagnosis.     I explained the risks of thyroidectomy include, but are not limited to, infection, bleeding, scarring, failure to achieve the diagnosis, no evidence of cancer, recurrence, collection of blood or tissue fluid requiring drainage, injury to the recurrent laryngeal nerve with resultant temporary or permanent hoarseness (1% permanent risk with  up to 10% temporary risk, greater in revision operations), injury to the superior laryngeal nerve with resultant loss of the upper register for singing or challenges with yelling, temporary or permanent hypocalcemia related to injury or devascularization of the parathyroid glands (less than 5% permanent, up to 30-60% when paratracheal dissection is accomplished, again greater in revision operations), and the need for additional procedures or therapies. Time was allowed for questions, and all questions were answered to the patient's apparent satisfaction.     she is aware that, if malignancy is detected, then she may require additional therapy.    Over 25 minutes were spent in face to face contact with the patient with greater than 50% spent discussing their diagnosis and coordination of care.         Patient: Josey Flores [id number], :1970  Procedure date:2017  Patient's medications, allergies, past medical, surgical, social and family histories were reviewed and updated as appropriate.  Chief Complaint:  Follow-up (review labs and MRI )    HPI:  Josey is a 46 y.o. female with the history of present illness as discussed in the clinic note from today.    Procedure: Risks, benefits, and alternatives of the procedure were discussed with the patient, and the patient consented to the fiberoptic examination.  We applied a topical nasal decongestant and analgesic.  After adequate anesthesia was obtained, the flexible fiberoptic scope transorally.  The pharynxand the larynx were visualized. At the end of the examination, the scope was removed. The patient tolerated the procedure well with no complications.     Findings:  -     Laryngeal mucosa is normal  -     Posterior commissure has mild hypertrophy  -     Lingual tonsils have no hypertrophy    -     Right vocal fold: normal mobility     mass/lesion: none  -     Left vocal fold: normal mobility     mass/lesion: none  -     Other findings:  none    Assessment & Plan:  - see today's clinic note

## 2017-08-19 DIAGNOSIS — C79.51 SECONDARY CANCER OF BONE: ICD-10-CM

## 2017-08-19 DIAGNOSIS — G89.3 NEOPLASM RELATED PAIN: ICD-10-CM

## 2017-08-19 DIAGNOSIS — C77.3 MALIGNANT NEOPLASM METASTATIC TO LYMPH NODE OF AXILLA: ICD-10-CM

## 2017-08-19 DIAGNOSIS — C50.811 MALIGNANT NEOPLASM OF OVERLAPPING SITES OF RIGHT FEMALE BREAST: ICD-10-CM

## 2017-08-19 RX ORDER — LETROZOLE 2.5 MG/1
TABLET, FILM COATED ORAL
Qty: 30 TABLET | Refills: 2 | Status: SHIPPED | OUTPATIENT
Start: 2017-08-19 | End: 2018-01-22 | Stop reason: SDUPTHER

## 2017-08-20 ENCOUNTER — PATIENT MESSAGE (OUTPATIENT)
Dept: OBSTETRICS AND GYNECOLOGY | Facility: CLINIC | Age: 47
End: 2017-08-20

## 2017-08-20 PROBLEM — C73 THYROID CANCER: Status: ACTIVE | Noted: 2017-08-20

## 2017-08-21 DIAGNOSIS — C79.51 SECONDARY CANCER OF BONE: ICD-10-CM

## 2017-08-21 DIAGNOSIS — C77.3 MALIGNANT NEOPLASM METASTATIC TO LYMPH NODE OF AXILLA: ICD-10-CM

## 2017-08-21 DIAGNOSIS — F41.9 ANXIETY: ICD-10-CM

## 2017-08-21 DIAGNOSIS — C50.811 MALIGNANT NEOPLASM OF OVERLAPPING SITES OF RIGHT BREAST IN FEMALE, ESTROGEN RECEPTOR POSITIVE: ICD-10-CM

## 2017-08-21 DIAGNOSIS — G89.3 NEOPLASM RELATED PAIN: ICD-10-CM

## 2017-08-21 DIAGNOSIS — Z17.0 MALIGNANT NEOPLASM OF OVERLAPPING SITES OF RIGHT BREAST IN FEMALE, ESTROGEN RECEPTOR POSITIVE: ICD-10-CM

## 2017-08-21 DIAGNOSIS — F32.A DEPRESSION, UNSPECIFIED DEPRESSION TYPE: ICD-10-CM

## 2017-08-21 RX ORDER — HYDROCODONE BITARTRATE AND ACETAMINOPHEN 7.5; 325 MG/1; MG/1
1 TABLET ORAL EVERY 6 HOURS PRN
Qty: 60 TABLET | Refills: 0 | Status: SHIPPED | OUTPATIENT
Start: 2017-08-21 | End: 2017-09-05 | Stop reason: SDUPTHER

## 2017-08-21 RX ORDER — LETROZOLE 2.5 MG/1
2.5 TABLET, FILM COATED ORAL DAILY
Qty: 30 TABLET | Refills: 3 | Status: SHIPPED | OUTPATIENT
Start: 2017-08-21 | End: 2018-01-22 | Stop reason: SDUPTHER

## 2017-08-22 DIAGNOSIS — G89.3 NEOPLASM RELATED PAIN: ICD-10-CM

## 2017-08-22 DIAGNOSIS — C77.3 MALIGNANT NEOPLASM METASTATIC TO LYMPH NODE OF AXILLA: ICD-10-CM

## 2017-08-22 DIAGNOSIS — C79.51 SECONDARY CANCER OF BONE: ICD-10-CM

## 2017-08-22 DIAGNOSIS — C50.811 MALIGNANT NEOPLASM OF OVERLAPPING SITES OF RIGHT FEMALE BREAST: ICD-10-CM

## 2017-08-22 RX ORDER — PALBOCICLIB 125 MG/1
CAPSULE ORAL
Qty: 21 CAPSULE | Refills: 2 | Status: SHIPPED | OUTPATIENT
Start: 2017-08-22 | End: 2017-11-20 | Stop reason: SDUPTHER

## 2017-08-22 NOTE — TELEPHONE ENCOUNTER
----- Message from Richard Delong MD sent at 8/21/2017  5:21 PM CDT -----  Contact: Patient   Refills sent. Thank you.  ----- Message -----  From: Juliet Gale MA  Sent: 8/21/2017   2:50 PM  To: Richard Delong MD        ----- Message -----  From: Radha Wang  Sent: 8/21/2017   2:40 PM  To: [facility] Staff    1. What is the name of the medication you are requesting? Rx Letrozole  2. What is the dose? 2.5 mg  3. How do you take the medication? Orally, topically, etc? oral  4. How often do you take this medication? Once a day  5. Do you need a 30 day or 90 day supply? N/A  6. How many refills are you requesting? N/A  7. What is your preferred pharmacy and location of the pharmacy?   [place] [address]  Phone: [phone] Fax: [phone]    8. Who can we contact with further questions? Patient/[phone]    1. What is the name of the medication you are requesting? Rx Hydrocodone  2. What is the dose? 7.5 mg  3. How do you take the medication? Orally, topically, etc? oral  4. How often do you take this medication? Every six hours  5. Do you need a 30 day or 90 day supply? N/A  6. How many refills are you requesting? N/A  7. What is your preferred pharmacy and location of the pharmacy?   [place] [address]  Phone: [phone] Fax: [phone]    8. Who can we contact with further questions? Patient/[phone]

## 2017-08-29 NOTE — PRE ADMISSION SCREENING
Pre op instructions reviewed with patient per phone:    To confirm, Your surgeon has instructed you:  Surgery is scheduled 08/31/17 at per Md.      [facility] by per MD.      INSTRUCTIONS IMPORTANT!!!  ¨ Do not eat, drink, or smoke after 12 midnight-including water. OK to brush teeth, no gum, candy or mints!    ¨ Take only these medicines with a small swallow of water-morning of surgery.  Bupropion, use Albuterol inhaler        ____  Do not wear makeup, including mascara.  ____  No powder, lotions or creams to surgical area.  ____  Please remove all jewelry, including piercings and leave at home.  ____  No money or valuables needed. Please leave at home.  ____  Please bring identification and insurance information to hospital.  ____  If going home the same day, arrange for a ride home. You will not be able to   drive if Anesthesia was used.  ____  Children, under 12 years old, must remain in the waiting room with an adult.  They are not allowed in patient areas.  ____  Wear loose fitting clothing. Allow for dressings, bandages.  ____  Stop Aspirin, Ibuprofen, Motrin and Aleve at least 5-7 days before surgery, unless otherwise instructed by your doctor, or the nurse.   You MAY use Tylenol/acetaminophen until day of surgery.  ____  If you take diabetic medication, do not take am of surgery unless instructed by   Doctor.  ____ Stop taking any Fish Oil supplement or any Vitamins that contain Vitamin E at least 5 days prior to surgery.          Bathing Instructions-- The night before surgery and the morning prior to coming to the hospital:   -Do not shave the surgical area.   -Shower and wash your hair and body as usual with anti-bacterial  soap and shampoo.   -Rinse your hair and body completely.   -Use one packet of hibiclens to wash the surgical site (using your hand) gently for 5 minutes.  Do not scrub you skin too hard.   -Do not use hibiclens on your  head, face, or genitals.   -Do not wash with anti-bacterial soap after you use the hibiclens.   -Rinse your body thoroughly.   -Dry with clean, soft towel.  Do not use lotion, cream, deodorant, or powders on   the surgical site.    Use antibacterial soap in place of hibiclens if your surgery is on the head, face or genitals.         Surgical Site Infection    Prevention of surgical site infections:     -Keep incisions clean and dry.   -Do not soak/submerge incisions in water until completely healed.   -Do not apply lotions, powders, creams, or deodorants to site.   -Always make sure hands are cleaned with antibacterial soap/ alcohol-based   prior to touching the surgical site.  (This includes doctors, nurses, staff, and yourself.)    Signs and symptoms:   -Redness and pain around the area where you had surgery   -Drainage of cloudy fluid from your surgical wound   -Fever over 100.4  I have read or had read and explained to me, and understand the above information.

## 2017-08-30 ENCOUNTER — ANESTHESIA EVENT (OUTPATIENT)
Dept: SURGERY | Facility: HOSPITAL | Age: 47
End: 2017-08-30
Payer: MEDICAID

## 2017-08-30 NOTE — H&P (VIEW-ONLY)
Referring Provider:    No referring provider defined for this encounter.  Subjective:   Patient: Josey Flores [id number], :1970   Visit date:2017 9:26 PM    Chief Complaint:  Follow-up (review labs and MRI )    HPI:  Josey is a 46 y.o. female who I was asked to see in consultation for evaluation of the following issue(s):    COMPRESSIVE SYMPTOMS OR MINOR RISK FACTORS FOR THYROID CANCER:  Increasing in size over the past 6 months:  Yes  Dysphagia: No  Dyspnea on exertion:  No  Orthopnea:  No  Hemoptysis:  No  Voice changes:  No  Pain:  Yes  AGE >45  Yes   FEMALE Yes    HIGH RISK HISTORY FOR THYROID CANCER:  Thyroid cancer in 1 or more first degree relatives:  No  History of radiation:  No  Prior thyroidectomy with dx of thyroid cancer:  No  PET positive nodule:  Yes  Multiple Endocrine Neoplasia:  No  Familial Medullary Thyroid Cancer:  No    (2009 REVISED [employer]ICAN [employer]YRO[employer]OCIATION MANAGEMENT GUIDELINES FOR PATIENTS WITH THYROID NODULES AND DIFFERENTIATED THYROID CANCER)      TSH:  normal    She has metastatic breast cancer.  She has had multiple operations for this as well as adjuvant therapy.  Her most recent PET demonstrated excellent response to therapy with no FDG avid lesions except for the thyroid.    Review of Systems:  Negative unless checked off.  Gen:  []fever   [x]fatigue  HENT:  []nosebleeds  []dental problem   Eyes:  []photophobia  []visual disturbance  Resp:  []chest tightness []wheezing  Card:  []chest pain  []leg swelling  GI:  []abdominal pain []blood in stool  :  []dysuria  []hematuria  Musc:  []joint swelling  []gait problem  Skin:  []color change  []pallor  Neuro:  []seizures  []numbness  Hem:  [x]bruise/bleed easily  Psych:  []hallucinations  [x]behavioral problems- anxiety, depression  Allergy/Imm: is allergic to vancomycin analogues.    Her meds, allergies, medical, surgical, social & family histories were reviewed & updated:  -     She has a current medication list which  includes the following prescription(s): albuterol, alprazolam, bupropion, citalopram, ergocalciferol, fentanyl, furosemide, hydrocodone-acetaminophen 7.5-325mg, ibrance, letrozole, multivitamin, ondansetron, potassium chloride sa, prochlorperazine, varenicline, and varenicline.  -     She  has a past medical history of Cancer.   -     She  does not have any pertinent problems on file.   -     She  has a past surgical history that includes Cholecystectomy; Tonsillectomy; Adenoidectomy; Appendectomy; Abscess drainage; and Skin graft (06/16/2017).  -     She  reports that she quit smoking about 2 months ago. Her smoking use included Cigarettes. She smoked 1.00 pack per day. She has never used smokeless tobacco. She reports that she does not drink alcohol or use drugs.  -     Her family history is not on file.  -     She is allergic to vancomycin analogues.    Objective:   Physical Exam:  Vitals:  /72   Pulse 92   Temp 98.3 °F (36.8 °C) (Tympanic)   Wt 95.2 kg (209 lb 14.1 oz)   LMP  (LMP Unknown)   BMI 32.87 kg/m²   General appearance:  Well developed, well nourished    Eyes:  Extraocular motions intact, PERRL    Communication:  no hoarseness, no dysphonia    Ears:  Otoscopy of external auditory canals and tympanic membranes was normal, clinical speech reception thresholds grossly intact, no mass/lesion of auricle.  Nose:  No masses/lesions of external nose, nasal mucosa, septum, and turbinates were within normal limits.  Mouth:  No mass/lesion of lips, teeth, gums, hard/soft palate, tongue, tonsils, or oropharynx.    Cardiovascular:  No pedal edema; Radial Pulses +2     Neck & Lymphatics:  No cervical lymphadenopathy, no neck mass/crepitus/ asymmetry, trachea is midline, moderate tenderness over right thyroid lobe with palpable nodule    Psych: Oriented x3,  Alert with normal mood and affect.     Respiration/Chest:  Symmetric expansion during respiration, normal respiratory effort.    Skin:  Warm and  intact. No ulcerations of face, scalp, neck.    PET/CT July 12, 2017    Comparison: PET CT dated 07/12/2017    Technique: Multiplanar/multisequence MRI of the neck was performed with and without contrast.  9.5 cc IV Gadavist was administered.    Findings:     There is a lobular heterogeneous signal nodule within the midportion of the right thyroid lobe measuring approximately 2.2 x 1.8 x 2.7 cm.  Lesion demonstrates peripheral T2 and T1 hyperintense signal with low signal centrally.  There is some suspected low level central enhancement.  No definite evidence of extracapsular or perineural invasion.    No cervical adenopathy demonstrated by size criteria.    Bilateral parotid and submandibular glands appear within normal limits.  Nasopharynx, oropharynx, hypopharynx, and larynx appear within normal limits.  No mucosal or submucosal soft tissue masses or fluid collections visualized.    Cervical flow voids appear patent with no evidence of vascular occlusion or encasement.    Focal T2 hyperintense focus noted within the manubrium on the left which correlates with the known lytic metastatic focus seen on prior PET/CT.   Impression       Right thyroid nodule as above, in keeping with known biopsy proven malignancy.  No definite evidence of extracapsular invasion or suspicious cervical adenopathy.    Electronically signed by: THANH SANZ MD  Date: 08/01/17  Time: 10:53          PATHOLOGY:  FINAL PATHOLOGIC DIAGNOSIS  Right thyroid, FNA:  [facility] Thyroid Cytology Category: Malignant, see note.  Note: The specimen consists of moderately cellular smears showing groups of atypical cells with powdery nuclear  chromatin and occasional prominent nucleoli. Some groups show a papillary like architecture. While a papillary  thyroid carcinoma is in the differential, the classic nuclear features are not readily evident and other malignancies  cannot be entirely ruled out. Follow up recommended as clinically  indicated.  [facility]  Diagnosed by: Mendoza Melendez M.D.  (Electronically Signed: 2017-07-21 15:08:33)  Assessment & Plan:   Josey was seen today for follow-up.    Diagnoses and all orders for this visit:    Malignant neoplasm of thyroid gland  -     Ambulatory Referral to Endocrinology        Livermore 5 and 6 represent nodules suspicious for malignancy or confirmed malignancy with over 90% being found to have malignancy on surgical pathology (Journal of Otolaryngology - Head & Neck Surgery 2013, 42:61).  At least hemithyroidectomy should be considered for Livermore 5, if not total thyroidectomy.  Total thyroidectomy is recommended for all cases of FNA confirmed malignancy. I discussed with her that it is possible that this is a metastatic breast CA, but it is more likely that this is a second primary malignancy.   She needs a total thyroidectomy without lymph node dissection.    Lateral neck dissection (levels 2-5) would only be performed if there is significant lymphadenopathy in the lateral compartment on imaging.  Routine prophylactic CND is generally not recommended at the time of initial surgery for PTC. Thyroidectomy alone is appropriate for small (T1 or T2), noninvasive, and cN0 PTC. A pCND might be considered for larger (T3 or T4) tumors.  However, for clinically evident disease it is recommended.  Her MRI does not show any evidence of pathologic lymphadenopathy.  Finally, we do not have a firm confirmation of the type of thyroid cancer that is present- differentiated vs poorly differentiated, metastatic breast ca, but based on FNA, PTC is by far the most likely diagnosis.     I explained the risks of thyroidectomy include, but are not limited to, infection, bleeding, scarring, failure to achieve the diagnosis, no evidence of cancer, recurrence, collection of blood or tissue fluid requiring drainage, injury to the recurrent laryngeal nerve with resultant temporary or permanent hoarseness (1% permanent risk with  up to 10% temporary risk, greater in revision operations), injury to the superior laryngeal nerve with resultant loss of the upper register for singing or challenges with yelling, temporary or permanent hypocalcemia related to injury or devascularization of the parathyroid glands (less than 5% permanent, up to 30-60% when paratracheal dissection is accomplished, again greater in revision operations), and the need for additional procedures or therapies. Time was allowed for questions, and all questions were answered to the patient's apparent satisfaction.     she is aware that, if malignancy is detected, then she may require additional therapy.    Over 25 minutes were spent in face to face contact with the patient with greater than 50% spent discussing their diagnosis and coordination of care.         Patient: Josey Flores [id number], :1970  Procedure date:2017  Patient's medications, allergies, past medical, surgical, social and family histories were reviewed and updated as appropriate.  Chief Complaint:  Follow-up (review labs and MRI )    HPI:  Josey is a 46 y.o. female with the history of present illness as discussed in the clinic note from today.    Procedure: Risks, benefits, and alternatives of the procedure were discussed with the patient, and the patient consented to the fiberoptic examination.  We applied a topical nasal decongestant and analgesic.  After adequate anesthesia was obtained, the flexible fiberoptic scope transorally.  The pharynxand the larynx were visualized. At the end of the examination, the scope was removed. The patient tolerated the procedure well with no complications.     Findings:  -     Laryngeal mucosa is normal  -     Posterior commissure has mild hypertrophy  -     Lingual tonsils have no hypertrophy    -     Right vocal fold: normal mobility     mass/lesion: none  -     Left vocal fold: normal mobility     mass/lesion: none  -     Other findings:  none    Assessment & Plan:  - see today's clinic note

## 2017-08-31 ENCOUNTER — ANESTHESIA (OUTPATIENT)
Dept: SURGERY | Facility: HOSPITAL | Age: 47
End: 2017-08-31
Payer: MEDICAID

## 2017-08-31 ENCOUNTER — HOSPITAL ENCOUNTER (OUTPATIENT)
Facility: HOSPITAL | Age: 47
Discharge: HOME | End: 2017-09-01
Attending: OTOLARYNGOLOGY | Admitting: OTOLARYNGOLOGY
Payer: MEDICAID

## 2017-08-31 ENCOUNTER — SURGERY (OUTPATIENT)
Age: 47
End: 2017-08-31

## 2017-08-31 DIAGNOSIS — C73 THYROID CANCER: Primary | ICD-10-CM

## 2017-08-31 DIAGNOSIS — C73 PAPILLARY THYROID CARCINOMA: ICD-10-CM

## 2017-08-31 LAB
CALCIUM SERPL-MCNC: 8.4 MG/DL
CALCIUM SERPL-MCNC: 8.8 MG/DL
MAGNESIUM SERPL-MCNC: 2 MG/DL

## 2017-08-31 PROCEDURE — 25000003 PHARM REV CODE 250: Performed by: ANESTHESIOLOGY

## 2017-08-31 PROCEDURE — 63600175 PHARM REV CODE 636 W HCPCS: Performed by: NURSE ANESTHETIST, CERTIFIED REGISTERED

## 2017-08-31 PROCEDURE — 36415 COLL VENOUS BLD VENIPUNCTURE: CPT

## 2017-08-31 PROCEDURE — 88307 TISSUE EXAM BY PATHOLOGIST: CPT | Performed by: PATHOLOGY

## 2017-08-31 PROCEDURE — 25000003 PHARM REV CODE 250: Performed by: OTOLARYNGOLOGY

## 2017-08-31 PROCEDURE — 25000003 PHARM REV CODE 250: Performed by: NURSE ANESTHETIST, CERTIFIED REGISTERED

## 2017-08-31 PROCEDURE — 83735 ASSAY OF MAGNESIUM: CPT

## 2017-08-31 PROCEDURE — 37000009 HC ANESTHESIA EA ADD 15 MINS: Performed by: OTOLARYNGOLOGY

## 2017-08-31 PROCEDURE — 36000707: Performed by: OTOLARYNGOLOGY

## 2017-08-31 PROCEDURE — 27201423 OPTIME MED/SURG SUP & DEVICES STERILE SUPPLY: Performed by: OTOLARYNGOLOGY

## 2017-08-31 PROCEDURE — 60240 REMOVAL OF THYROID: CPT | Mod: ,,, | Performed by: OTOLARYNGOLOGY

## 2017-08-31 PROCEDURE — 36000706: Performed by: OTOLARYNGOLOGY

## 2017-08-31 PROCEDURE — 37000008 HC ANESTHESIA 1ST 15 MINUTES: Performed by: OTOLARYNGOLOGY

## 2017-08-31 PROCEDURE — 88307 TISSUE EXAM BY PATHOLOGIST: CPT | Mod: 26,,, | Performed by: PATHOLOGY

## 2017-08-31 PROCEDURE — 63600175 PHARM REV CODE 636 W HCPCS: Performed by: OTOLARYNGOLOGY

## 2017-08-31 PROCEDURE — 82310 ASSAY OF CALCIUM: CPT | Mod: 91

## 2017-08-31 PROCEDURE — A4216 STERILE WATER/SALINE, 10 ML: HCPCS | Performed by: ANESTHESIOLOGY

## 2017-08-31 PROCEDURE — 71000033 HC RECOVERY, INTIAL HOUR: Performed by: OTOLARYNGOLOGY

## 2017-08-31 PROCEDURE — 71000039 HC RECOVERY, EACH ADD'L HOUR: Performed by: OTOLARYNGOLOGY

## 2017-08-31 RX ORDER — ALBUTEROL SULFATE 2.5 MG/.5ML
2.5 SOLUTION RESPIRATORY (INHALATION) EVERY 6 HOURS PRN
Status: DISCONTINUED | OUTPATIENT
Start: 2017-08-31 | End: 2017-09-01 | Stop reason: HOSPADM

## 2017-08-31 RX ORDER — SODIUM CHLORIDE, SODIUM LACTATE, POTASSIUM CHLORIDE, CALCIUM CHLORIDE 600; 310; 30; 20 MG/100ML; MG/100ML; MG/100ML; MG/100ML
INJECTION, SOLUTION INTRAVENOUS CONTINUOUS
Status: DISCONTINUED | OUTPATIENT
Start: 2017-08-31 | End: 2017-08-31

## 2017-08-31 RX ORDER — OXYCODONE HYDROCHLORIDE 5 MG/1
5 TABLET ORAL
Status: DISCONTINUED | OUTPATIENT
Start: 2017-08-31 | End: 2017-08-31 | Stop reason: HOSPADM

## 2017-08-31 RX ORDER — PROPOFOL 10 MG/ML
VIAL (ML) INTRAVENOUS
Status: DISCONTINUED | OUTPATIENT
Start: 2017-08-31 | End: 2017-08-31

## 2017-08-31 RX ORDER — SODIUM CHLORIDE 0.9 % (FLUSH) 0.9 %
3 SYRINGE (ML) INJECTION EVERY 8 HOURS
Status: DISCONTINUED | OUTPATIENT
Start: 2017-08-31 | End: 2017-08-31 | Stop reason: HOSPADM

## 2017-08-31 RX ORDER — BUPROPION HYDROCHLORIDE 100 MG/1
100 TABLET ORAL 2 TIMES DAILY
Status: DISCONTINUED | OUTPATIENT
Start: 2017-08-31 | End: 2017-09-01 | Stop reason: HOSPADM

## 2017-08-31 RX ORDER — SODIUM CHLORIDE 0.9 % (FLUSH) 0.9 %
3 SYRINGE (ML) INJECTION
Status: DISCONTINUED | OUTPATIENT
Start: 2017-08-31 | End: 2017-08-31

## 2017-08-31 RX ORDER — MEPERIDINE HYDROCHLORIDE 50 MG/ML
12.5 INJECTION INTRAMUSCULAR; INTRAVENOUS; SUBCUTANEOUS ONCE AS NEEDED
Status: DISCONTINUED | OUTPATIENT
Start: 2017-08-31 | End: 2017-08-31 | Stop reason: HOSPADM

## 2017-08-31 RX ORDER — OXYCODONE HYDROCHLORIDE 5 MG/1
10 TABLET ORAL EVERY 4 HOURS PRN
Status: DISCONTINUED | OUTPATIENT
Start: 2017-08-31 | End: 2017-09-01 | Stop reason: HOSPADM

## 2017-08-31 RX ORDER — POTASSIUM CHLORIDE 20 MEQ/1
20 TABLET, EXTENDED RELEASE ORAL DAILY
Status: DISCONTINUED | OUTPATIENT
Start: 2017-08-31 | End: 2017-09-01 | Stop reason: HOSPADM

## 2017-08-31 RX ORDER — FUROSEMIDE 20 MG/1
20 TABLET ORAL DAILY
Status: DISCONTINUED | OUTPATIENT
Start: 2017-08-31 | End: 2017-09-01 | Stop reason: HOSPADM

## 2017-08-31 RX ORDER — ALBUTEROL SULFATE 90 UG/1
2 AEROSOL, METERED RESPIRATORY (INHALATION) EVERY 6 HOURS
Status: DISCONTINUED | OUTPATIENT
Start: 2017-08-31 | End: 2017-08-31

## 2017-08-31 RX ORDER — CITALOPRAM 20 MG/1
20 TABLET, FILM COATED ORAL NIGHTLY
Status: DISCONTINUED | OUTPATIENT
Start: 2017-08-31 | End: 2017-09-01 | Stop reason: HOSPADM

## 2017-08-31 RX ORDER — OXYCODONE HYDROCHLORIDE 5 MG/1
5 TABLET ORAL EVERY 4 HOURS PRN
Status: DISCONTINUED | OUTPATIENT
Start: 2017-08-31 | End: 2017-09-01 | Stop reason: HOSPADM

## 2017-08-31 RX ORDER — CEFAZOLIN SODIUM 2 G/50ML
2 SOLUTION INTRAVENOUS ONCE
Status: COMPLETED | OUTPATIENT
Start: 2017-08-31 | End: 2017-08-31

## 2017-08-31 RX ORDER — ACETAMINOPHEN 10 MG/ML
INJECTION, SOLUTION INTRAVENOUS
Status: DISCONTINUED | OUTPATIENT
Start: 2017-08-31 | End: 2017-08-31

## 2017-08-31 RX ORDER — AMOXICILLIN 250 MG
1 CAPSULE ORAL 2 TIMES DAILY
Status: DISCONTINUED | OUTPATIENT
Start: 2017-08-31 | End: 2017-09-01 | Stop reason: HOSPADM

## 2017-08-31 RX ORDER — LETROZOLE 2.5 MG/1
2.5 TABLET, FILM COATED ORAL DAILY
Status: DISCONTINUED | OUTPATIENT
Start: 2017-08-31 | End: 2017-09-01 | Stop reason: HOSPADM

## 2017-08-31 RX ORDER — DEXAMETHASONE SODIUM PHOSPHATE 4 MG/ML
INJECTION, SOLUTION INTRA-ARTICULAR; INTRALESIONAL; INTRAMUSCULAR; INTRAVENOUS; SOFT TISSUE
Status: DISCONTINUED | OUTPATIENT
Start: 2017-08-31 | End: 2017-08-31

## 2017-08-31 RX ORDER — NALOXONE HCL 0.4 MG/ML
VIAL (ML) INJECTION
Status: DISCONTINUED | OUTPATIENT
Start: 2017-08-31 | End: 2017-08-31

## 2017-08-31 RX ORDER — LIDOCAINE HYDROCHLORIDE 10 MG/ML
INJECTION INFILTRATION; PERINEURAL
Status: DISCONTINUED | OUTPATIENT
Start: 2017-08-31 | End: 2017-08-31

## 2017-08-31 RX ORDER — CALCIUM CARBONATE 500(1250)
2000 TABLET ORAL ONCE
Status: COMPLETED | OUTPATIENT
Start: 2017-08-31 | End: 2017-08-31

## 2017-08-31 RX ORDER — ACETAMINOPHEN 325 MG/1
650 TABLET ORAL EVERY 4 HOURS PRN
Status: DISCONTINUED | OUTPATIENT
Start: 2017-08-31 | End: 2017-09-01 | Stop reason: HOSPADM

## 2017-08-31 RX ORDER — ALPRAZOLAM 0.5 MG/1
0.5 TABLET ORAL 2 TIMES DAILY PRN
Status: DISCONTINUED | OUTPATIENT
Start: 2017-08-31 | End: 2017-09-01 | Stop reason: HOSPADM

## 2017-08-31 RX ORDER — FENTANYL CITRATE 50 UG/ML
INJECTION, SOLUTION INTRAMUSCULAR; INTRAVENOUS
Status: DISCONTINUED | OUTPATIENT
Start: 2017-08-31 | End: 2017-08-31

## 2017-08-31 RX ORDER — METOCLOPRAMIDE HYDROCHLORIDE 5 MG/ML
10 INJECTION INTRAMUSCULAR; INTRAVENOUS EVERY 10 MIN PRN
Status: DISCONTINUED | OUTPATIENT
Start: 2017-08-31 | End: 2017-08-31 | Stop reason: HOSPADM

## 2017-08-31 RX ORDER — LIDOCAINE HYDROCHLORIDE 10 MG/ML
1 INJECTION, SOLUTION EPIDURAL; INFILTRATION; INTRACAUDAL; PERINEURAL ONCE
Status: DISCONTINUED | OUTPATIENT
Start: 2017-08-31 | End: 2017-08-31 | Stop reason: HOSPADM

## 2017-08-31 RX ORDER — SUCCINYLCHOLINE CHLORIDE 20 MG/ML
INJECTION INTRAMUSCULAR; INTRAVENOUS
Status: DISCONTINUED | OUTPATIENT
Start: 2017-08-31 | End: 2017-08-31

## 2017-08-31 RX ORDER — MIDAZOLAM HYDROCHLORIDE 1 MG/ML
INJECTION, SOLUTION INTRAMUSCULAR; INTRAVENOUS
Status: DISCONTINUED | OUTPATIENT
Start: 2017-08-31 | End: 2017-08-31

## 2017-08-31 RX ORDER — ROCURONIUM BROMIDE 10 MG/ML
INJECTION, SOLUTION INTRAVENOUS
Status: DISCONTINUED | OUTPATIENT
Start: 2017-08-31 | End: 2017-08-31

## 2017-08-31 RX ORDER — CALCITRIOL 0.25 UG/1
0.5 CAPSULE ORAL DAILY
Status: DISCONTINUED | OUTPATIENT
Start: 2017-08-31 | End: 2017-09-01 | Stop reason: HOSPADM

## 2017-08-31 RX ORDER — MORPHINE SULFATE 10 MG/ML
2 INJECTION INTRAMUSCULAR; INTRAVENOUS; SUBCUTANEOUS EVERY 5 MIN PRN
Status: DISCONTINUED | OUTPATIENT
Start: 2017-08-31 | End: 2017-08-31 | Stop reason: HOSPADM

## 2017-08-31 RX ORDER — VARENICLINE TARTRATE 1 MG/1
1 TABLET, FILM COATED ORAL 2 TIMES DAILY
Status: DISCONTINUED | OUTPATIENT
Start: 2017-08-31 | End: 2017-09-01 | Stop reason: HOSPADM

## 2017-08-31 RX ORDER — MORPHINE SULFATE 2 MG/ML
2 INJECTION, SOLUTION INTRAMUSCULAR; INTRAVENOUS EVERY 4 HOURS PRN
Status: DISCONTINUED | OUTPATIENT
Start: 2017-08-31 | End: 2017-09-01 | Stop reason: HOSPADM

## 2017-08-31 RX ORDER — BUPIVACAINE HYDROCHLORIDE AND EPINEPHRINE 5; 5 MG/ML; UG/ML
INJECTION, SOLUTION EPIDURAL; INTRACAUDAL; PERINEURAL
Status: DISCONTINUED | OUTPATIENT
Start: 2017-08-31 | End: 2017-08-31 | Stop reason: HOSPADM

## 2017-08-31 RX ORDER — LANOLIN ALCOHOL/MO/W.PET/CERES
400 CREAM (GRAM) TOPICAL 2 TIMES DAILY
Status: DISCONTINUED | OUTPATIENT
Start: 2017-08-31 | End: 2017-09-01 | Stop reason: HOSPADM

## 2017-08-31 RX ORDER — ONDANSETRON 2 MG/ML
4 INJECTION INTRAMUSCULAR; INTRAVENOUS EVERY 12 HOURS PRN
Status: DISCONTINUED | OUTPATIENT
Start: 2017-08-31 | End: 2017-09-01 | Stop reason: HOSPADM

## 2017-08-31 RX ADMIN — FENTANYL CITRATE 100 MCG: 50 INJECTION, SOLUTION INTRAMUSCULAR; INTRAVENOUS at 12:08

## 2017-08-31 RX ADMIN — ROCURONIUM BROMIDE 5 MG: 10 INJECTION, SOLUTION INTRAVENOUS at 09:08

## 2017-08-31 RX ADMIN — LETROZOLE 2.5 MG: 2.5 TABLET, FILM COATED ORAL at 05:08

## 2017-08-31 RX ADMIN — MORPHINE SULFATE 2 MG: 2 INJECTION, SOLUTION INTRAMUSCULAR; INTRAVENOUS at 08:08

## 2017-08-31 RX ADMIN — DEXAMETHASONE SODIUM PHOSPHATE 12 MG: 4 INJECTION, SOLUTION INTRA-ARTICULAR; INTRALESIONAL; INTRAMUSCULAR; INTRAVENOUS; SOFT TISSUE at 11:08

## 2017-08-31 RX ADMIN — MAGNESIUM OXIDE TAB 400 MG (241.3 MG ELEMENTAL MG) 400 MG: 400 (241.3 MG) TAB at 08:08

## 2017-08-31 RX ADMIN — BUPIVACAINE HYDROCHLORIDE AND EPINEPHRINE 30 ML: 5; 5 INJECTION, SOLUTION EPIDURAL; INTRACAUDAL; PERINEURAL at 10:08

## 2017-08-31 RX ADMIN — PROPOFOL 100 MG: 10 INJECTION, EMULSION INTRAVENOUS at 12:08

## 2017-08-31 RX ADMIN — Medication 100 MG: at 01:08

## 2017-08-31 RX ADMIN — LIDOCAINE HYDROCHLORIDE 80 MG: 10 INJECTION, SOLUTION INFILTRATION; PERINEURAL at 09:08

## 2017-08-31 RX ADMIN — SUCCINYLCHOLINE CHLORIDE 120 MG: 20 INJECTION, SOLUTION INTRAMUSCULAR; INTRAVENOUS at 09:08

## 2017-08-31 RX ADMIN — OXYCODONE HYDROCHLORIDE 5 MG: 5 TABLET ORAL at 05:08

## 2017-08-31 RX ADMIN — SODIUM CHLORIDE, SODIUM LACTATE, POTASSIUM CHLORIDE, AND CALCIUM CHLORIDE: 600; 310; 30; 20 INJECTION, SOLUTION INTRAVENOUS at 11:08

## 2017-08-31 RX ADMIN — NALOXONE HYDROCHLORIDE 0.2 MG: 0.4 INJECTION, SOLUTION INTRAMUSCULAR; INTRAVENOUS; SUBCUTANEOUS at 01:08

## 2017-08-31 RX ADMIN — VARENICLINE TARTRATE 1 MG: 1 TABLET, FILM COATED ORAL at 08:08

## 2017-08-31 RX ADMIN — BUPROPION HYDROCHLORIDE 100 MG: 100 TABLET, FILM COATED ORAL at 08:08

## 2017-08-31 RX ADMIN — MIDAZOLAM HYDROCHLORIDE 2 MG: 1 INJECTION, SOLUTION INTRAMUSCULAR; INTRAVENOUS at 09:08

## 2017-08-31 RX ADMIN — PROPOFOL 200 MG: 10 INJECTION, EMULSION INTRAVENOUS at 09:08

## 2017-08-31 RX ADMIN — PROPOFOL 100 MG: 10 INJECTION, EMULSION INTRAVENOUS at 10:08

## 2017-08-31 RX ADMIN — FENTANYL CITRATE 100 MCG: 50 INJECTION, SOLUTION INTRAMUSCULAR; INTRAVENOUS at 09:08

## 2017-08-31 RX ADMIN — ACETAMINOPHEN 1000 MG: 10 INJECTION, SOLUTION INTRAVENOUS at 01:08

## 2017-08-31 RX ADMIN — Medication 2000 MG: at 05:08

## 2017-08-31 RX ADMIN — CITALOPRAM HYDROBROMIDE 20 MG: 20 TABLET ORAL at 08:08

## 2017-08-31 RX ADMIN — OXYCODONE HYDROCHLORIDE 10 MG: 5 TABLET ORAL at 10:08

## 2017-08-31 RX ADMIN — STANDARDIZED SENNA CONCENTRATE AND DOCUSATE SODIUM 1 TABLET: 8.6; 5 TABLET, FILM COATED ORAL at 08:08

## 2017-08-31 RX ADMIN — FUROSEMIDE 20 MG: 20 TABLET ORAL at 05:08

## 2017-08-31 RX ADMIN — CEFAZOLIN SODIUM 2 G: 2 SOLUTION INTRAVENOUS at 09:08

## 2017-08-31 RX ADMIN — SODIUM CHLORIDE, PRESERVATIVE FREE 3 ML: 5 INJECTION INTRAVENOUS at 05:08

## 2017-08-31 RX ADMIN — PROPOFOL 50 MG: 10 INJECTION, EMULSION INTRAVENOUS at 10:08

## 2017-08-31 RX ADMIN — CALCITRIOL 0.5 MCG: 0.25 CAPSULE, LIQUID FILLED ORAL at 05:08

## 2017-08-31 RX ADMIN — POTASSIUM CHLORIDE 20 MEQ: 1500 TABLET, EXTENDED RELEASE ORAL at 05:08

## 2017-08-31 RX ADMIN — SODIUM CHLORIDE, SODIUM LACTATE, POTASSIUM CHLORIDE, AND CALCIUM CHLORIDE: 600; 310; 30; 20 INJECTION, SOLUTION INTRAVENOUS at 09:08

## 2017-08-31 NOTE — PLAN OF CARE
Pt resting on stretcher s/p total thyroidectomy performed under general anesthesia by Dr. Baugh. Respirations even and unlabored on 98% face tent with O2 sats of 100%. Neck dsg remains c/d/i. VSS. See flow sheet for detailed assessment. Will cont to monitor.

## 2017-08-31 NOTE — OP NOTE
Operative Note       Surgery Date: 8/31/2017     Surgeon: Zaid Baugh MD    Assistant:  None    Implants- None    Pre-op Diagnosis:  Papillary thyroid cancer     Post-op Diagnosis: same    Anesthesia: GETA    Technical Procedures Used:     Total thyroidectomy    Findings:  The bilateral recurrent laryngeal nerve(s) was identified and preserved.   The NIMS system was used at 0.5 mA with confirmed stimulation of the nerve(s) immediately prior to closure.  bilateral parathyroid tissue was identified and preserved with a viable blood supply.  The right lobe had significant adherence to the overlying strap muscles.  There was not significant substernal extension of the bilateral thyroid lobe (s).    Estimated Blood Loss:  Minimal                       Specimens:   Specimens     Start     Ordered    08/31/17 1236  Specimen to Pathology - Surgery  Once      08/31/17 1245                 Indications: This patient presents with a palpable nodule on the right side of the neck.  Fine needle aspiration cytology revealed findings consistent with a papillary carcinoma.  The patient now presents for a thyroid lobectomy and possible total thyroidectomy.    Procedure Details   The patient was seen in the Holding Room. The risks, benefits, complications, treatment options, and expected outcomes were discussed with the patient. The possibilities of reaction to medication, pulmonary aspiration, perforation of viscus, bleeding, recurrent infection, finding a normal thyroid, recurrently laryngeal nerve damage, the need for additional procedures, failure to diagnose a condition, and creating a complication requiring transfusion or operation were discussed with the patient. The patient concurred with the proposed plan, giving informed consent.  The site of surgery properly noted/marked. The patient was taken to Operating Room, identified as Josey Flores and the procedure verified as Thyroidectomy. A Time Out was held and the above  information confirmed.    The patient was placed supine after induction of a general anesthetic.  The neck was extended and prepped and draped in standard fashion.  A 5 cm transverse cervical incision was created above the sternal notch within a natural skin fold.  The strap muscles were identified and divided at the midline.  Sharp dissection was used to mobilize the right thyroid lobe in a medial direction.  Dissection continued posteriorly to expose the tracheoesophageal groove and right carotid artery.  The recurrent laryngeal nerve was identified and preserved.  The thyroid lobe was mobilized further and the superior and inferior pole vessels were divided with the harmonic scalpel.  The middle thyroid vein was divided with the harmonic scalpel.  Small vessels were likewise divided.  The gland was rotated in a medial direction and taken off the trachea using the bipolar.  The isthmus was removed off the thyroid cartilage using the bipolar.             Sharp dissection was used to mobilize the contralateral thyroid lobe in a medial direction.  Dissection continued posteriorly to expose the tracheoesophageal groove and right carotid artery.  The recurrent laryngeal nerve was identified and preserved.  The thyroid lobe was mobilized further and the superior and inferior pole vessels were divided with the harmonic scalpel.  The middle thyroid vein was divided with the harmonic scalpel.  Small vessels were likewise divided.  The gland was rotated in a medial direction and taken off the trachea using the bipolar.     A suture was placed for orientation purposes. The specimen was submitted to pathology for permanent pathology.    The wound was irrigated and inspected carefully.  The parathyroid tissue was found to be viable and the recurrent laryngeal nerve was left intact in its anatomic locations.  The strap muscles were closed with interrupted 3-0 Vicryl suture.  The platysma was closed with 4-0 Monocryl suture, and  the skin incision was closed with a 5-0 Monocryl subcuticular closure.  Dermabond Prineo was used to seal the incision.    Instrument, sponge, and needle counts were correct prior to closure and at the conclusion of the case.              Complications:  None; patient tolerated the procedure well.           Disposition: PACU - hemodynamically stable.           Condition: stable.    Attending Attestation: I performed the procedure.

## 2017-08-31 NOTE — TRANSFER OF CARE
"Anesthesia Transfer of Care Note    Patient: Josey Flores    Procedure(s) Performed: Procedure(s) (LRB):  THYROIDECTOMY (Bilateral)    Patient location: PACU    Anesthesia Type: general    Transport from OR: Transported from OR on room air with adequate spontaneous ventilation    Post pain: adequate analgesia    Post assessment: no apparent anesthetic complications and tolerated procedure well    Post vital signs: stable    Level of consciousness: awake and responds to stimulation    Nausea/Vomiting: no nausea/vomiting    Complications: none    Transfer of care protocol was followed      Last vitals:   Visit Vitals  /64   Pulse 84   Temp 37.2 °C (99 °F) (Temporal)   Resp 18   Ht 5' 7" (1.702 m)   Wt 97 kg (213 lb 11.8 oz)   LMP  (LMP Unknown)   SpO2 100%   Breastfeeding? No   BMI 33.48 kg/m²     "

## 2017-08-31 NOTE — ANESTHESIA PREPROCEDURE EVALUATION
08/31/2017  Josey Flores is a 46 y.o., female.    Anesthesia Evaluation    I have reviewed the Patient Summary Reports.    I have reviewed the Nursing Notes.   I have reviewed the Medications.     Review of Systems  Anesthesia Hx:  No problems with previous Anesthesia  History of prior surgery of interest to airway management or planning: Denies Family Hx of Anesthesia complications.   Denies Personal Hx of Anesthesia complications.   Social:  Smoker    Cardiovascular:  Cardiovascular Normal  ECG has been reviewed.    Pulmonary:   COPD, mild    Psych:   Psychiatric History          Physical Exam  General:  Well nourished    Airway/Jaw/Neck:  Airway Findings: Mouth Opening: Normal Tongue: Normal  Mallampati: II      Dental:  Dental Findings: Edentulous   Chest/Lungs:  Chest/Lungs Findings: Normal Respiratory Rate     Heart/Vascular:  Heart Findings: Normal            Anesthesia Plan  Type of Anesthesia, risks & benefits discussed:  Anesthesia Type:  general  Patient's Preference:   Intra-op Monitoring Plan: standard ASA monitors  Intra-op Monitoring Plan Comments:   Post Op Pain Control Plan:   Post Op Pain Control Plan Comments:   Induction:   IV  Beta Blocker:  Patient is not currently on a Beta-Blocker (No further documentation required).       Informed Consent: Patient understands risks and agrees with Anesthesia plan.  Questions answered. Anesthesia consent signed with patient.  ASA Score: 2     Day of Surgery Review of History & Physical: I have interviewed and examined the patient. I have reviewed the patient's H&P dated:  There are no significant changes.          Ready For Surgery From Anesthesia Perspective.

## 2017-09-01 LAB
CALCIUM SERPL-MCNC: 10 MG/DL
CALCIUM SERPL-MCNC: 9.8 MG/DL
CALCIUM SERPL-MCNC: 9.9 MG/DL

## 2017-09-01 PROCEDURE — 36415 COLL VENOUS BLD VENIPUNCTURE: CPT

## 2017-09-01 PROCEDURE — 82310 ASSAY OF CALCIUM: CPT | Mod: 91

## 2017-09-01 PROCEDURE — 25000003 PHARM REV CODE 250: Performed by: OTOLARYNGOLOGY

## 2017-09-01 PROCEDURE — 27000221 HC OXYGEN, UP TO 24 HOURS

## 2017-09-01 PROCEDURE — 94761 N-INVAS EAR/PLS OXIMETRY MLT: CPT

## 2017-09-01 PROCEDURE — 63600175 PHARM REV CODE 636 W HCPCS: Performed by: OTOLARYNGOLOGY

## 2017-09-01 RX ORDER — OXYCODONE AND ACETAMINOPHEN 5; 325 MG/1; MG/1
1 TABLET ORAL EVERY 4 HOURS PRN
Qty: 30 TABLET | Refills: 0 | Status: SHIPPED | OUTPATIENT
Start: 2017-09-01 | End: 2017-09-05 | Stop reason: ALTCHOICE

## 2017-09-01 RX ADMIN — FUROSEMIDE 20 MG: 20 TABLET ORAL at 09:09

## 2017-09-01 RX ADMIN — MAGNESIUM OXIDE TAB 400 MG (241.3 MG ELEMENTAL MG) 400 MG: 400 (241.3 MG) TAB at 08:09

## 2017-09-01 RX ADMIN — OXYCODONE HYDROCHLORIDE 10 MG: 5 TABLET ORAL at 01:09

## 2017-09-01 RX ADMIN — POTASSIUM CHLORIDE 20 MEQ: 1500 TABLET, EXTENDED RELEASE ORAL at 08:09

## 2017-09-01 RX ADMIN — VARENICLINE TARTRATE 1 MG: 1 TABLET, FILM COATED ORAL at 08:09

## 2017-09-01 RX ADMIN — BUPROPION HYDROCHLORIDE 100 MG: 100 TABLET, FILM COATED ORAL at 08:09

## 2017-09-01 RX ADMIN — OXYCODONE HYDROCHLORIDE 5 MG: 5 TABLET ORAL at 04:09

## 2017-09-01 RX ADMIN — MORPHINE SULFATE 2 MG: 2 INJECTION, SOLUTION INTRAMUSCULAR; INTRAVENOUS at 12:09

## 2017-09-01 RX ADMIN — CALCITRIOL 0.5 MCG: 0.25 CAPSULE, LIQUID FILLED ORAL at 08:09

## 2017-09-01 RX ADMIN — OXYCODONE HYDROCHLORIDE 5 MG: 5 TABLET ORAL at 08:09

## 2017-09-01 NOTE — NURSING
Discharge instructions discussed with patient and spouse.  Pt. Verbalized understanding.  Pt has no complaints at this time and is eager to go home.  IV DC'd and hemostasis achieved.  Pt. Will be transported to exit via wheelchair by pct and will be brought home by her .

## 2017-09-01 NOTE — PROGRESS NOTES
[facility]  Otorhinolaryngology-Head & Neck Surgery  Progress Note    Patient Name: Josey Flores  MRN: [id number]  Code Status: Prior  Admission Date: 8/31/2017  Hospital Length of Stay: 0 days  Attending Physician: Zaid Baugh MD  Primary Care Provider: Aileen Sadler MD    Subjective:     Interval History:   Patient is a very pleasant 46 year old female on POD #1 following a total thyroidectomy yesterday.  She has been having increased pain, and has required several doses of morphine.  She has been tolerating the oral pain medication with no nausea or other complications.  She has had a hard time swallowing, and has been on a soft diet (and liquids).  She denies any numbness around her mouth or of her fingers/toes.  She has not noted any hoarseness, and has not had any coughing or choking when she tries to swallow.    Post-Op Info:  Procedure(s) (LRB):  THYROIDECTOMY (Bilateral)   1 Day Post-Op  Hospital Day: 2      Medications:  Continuous Infusions:   Scheduled Meds:   buPROPion  100 mg Oral BID    calcitRIOL  0.5 mcg Oral Daily    citalopram  20 mg Oral QHS    furosemide  20 mg Oral Daily    letrozole  2.5 mg Oral Daily    magnesium oxide  400 mg Oral BID    palbociclib  125 mg/day Oral Daily    potassium chloride SA  20 mEq Oral Daily    senna-docusate 8.6-50 mg  1 tablet Oral BID    varenicline  1 mg Oral BID     PRN Meds:acetaminophen, albuterol sulfate, alprazolam, morphine, ondansetron, oxycodone, oxycodone, promethazine (PHENERGAN) IVPB     Objective:     Vital Signs (24h Range):  Temp:  [97.6 °F (36.4 °C)-99.3 °F (37.4 °C)] 97.9 °F (36.6 °C)  Pulse:  [73-88] 73  Resp:  [16-26] 18  SpO2:  [93 %-99 %] 93 %  BP: (103-136)/(46-68) 112/68        Lines/Drains/Airways     Peripheral Intravenous Line                 Peripheral IV - Single Lumen 08/31/17 0900 Right Antecubital 1 day                Physical Exam   Neck:   Incision clean dry and intact, no hematoma, no surrounding  edema or erythmea       Significant Labs:  Recent Lab Results       09/01/17  0848 09/01/17  0350 08/31/17  1816 08/31/17  1420      Calcium 9.9 9.8 8.8 8.4(L)     Magnesium    2.0             Assessment/Plan:     Active Diagnoses:    Diagnosis Date Noted POA    Papillary thyroid carcinoma [C73] 08/31/2017 Yes      Problems Resolved During this Admission:    Diagnosis Date Noted Date Resolved POA     Will plan on discharge today, will adjust her oral pain medication.      Shelia Prince MD  Otorhinolaryngology-Head & Neck Surgery  [facility]

## 2017-09-01 NOTE — PLAN OF CARE
Problem: Patient Care Overview  Goal: Plan of Care Review  Outcome: Ongoing (interventions implemented as appropriate)  POC discussed w/patient and spouse, verbalized understanding. Pt not on tele monitor. VSS. Voids per BRP. PT c/o pain relieved with oxycodone. Dressing CDI. Frequent weight shift encouraged. Patient turns independently in bed. Fall precautions in place, bed alarm on. Patient denies needs at this time.

## 2017-09-01 NOTE — DISCHARGE INSTRUCTIONS
Dr. Baugh is a member of the [facility].  He receives no payments or other incentives to participate in the program. Their website offers patients excellent informational videos and assists in coordination of care.  To visit this site, please visit:    [email or web address]      Thyroid Gland:  Your thyroid gland is in the front lower part of your neck. It makes hormones that help make your body work right. Your thyroid gland has 2 sections. Each section has parathyroid glands.    Thyroidectomy:  Thyroid nodules (round hard lump of cells) are common. If a nodule has cancer in it, then usually the entire thyroid gland is removed, but rarely just half of the gland is removed.  The reason for removing the whole gland rather than just half of the gland is that it lets doctors monitor your blood tests in the future to see if any thyroid tissue is present, suggesting a recurrence.  This is not possible if half of the gland is remaining.  Also, if you need radioactive iodine treatments, there cannot be any remaining thyroid tissue present so that the medicine goes to any microscopic disease in lymph nodes or other parts of your body that may not be detectable before cancer. Some people have a big thyroid that causes problems swallowing or breathing. This is called a goiter and is not cancer.If you have a goiter you may need surgery to take it out.     Dr. Baugh makes an incision (cut) in the lower area of your neck. The exact size of the cut varies, but is usually about 1 1/2 to 2 inches.  He then carefully cuts out the thyroid lobe(s.)  He will find your vocal cord nerve and work around it. There are tiny glands called parathyroid glands that are carefully cut away from the gland and left in the neck.  There are 2 of these glands on each side, and they control the amount of calcium in your blood  If your whole thyroid needs to be taken out, the same process is carried out on the  other side.     Your vocal cord is usually not harmed by the surgery. Your voice may be hoarse or weak after surgery. About 10% of patients have vocal cord immobility one one side, but this is permanent in only about 1-5% of cases We will check your vocal cords at your post-operative visit so try not to worry about it until then.     For patients who have the entire thyroid gland removed (both sides) parathyroid glands may not work as well as they should after surgery. For patients just having one side removed, it is extremely rare for this to be a problem.  If your parathyroid glands are not working well, this can cause your calcium blood levels to drop too low. This can be life-threatening.  This may be a problem for a short time, or it may be long lasting. Up to 30% of patients who have total thyroidectomies will have temporary problems with their parathyroid glands.  Less than 5% of patients have permanent parathyroid problems after thyroid surgery unless there is a more extensive cancer surgery performed at the same time called a central neck dissection.    If you have just one half of your thyroid removed (thyroid lobectomy, or hemithyroidectomy) then you may go home the same day of surgery. You will need someone to drive you home.      You will stay in the hospital at least 1 night if you had to have your whole thyroid taken out (total thyroidectomy). Your blood calcium levels will be checked every 6 hours. Also your parathyroid hormone levels will be checked.  If they are stable, you can go home the next day.  Dr. Baugh usually sends patients home on calcium and vitamin D supplementation.        The easiest way to get the right dose and swallow the pills easily is to buy over-the-counter TUMS ULTRA 1000mg chewable tablets.  Take 1 of these three times daily with meals.         If you have had your whole thyroid taken out, you will have to take a thyroid hormone pill every day for the rest of your life.  Discuss this with Dr. Baugh at your first postoperative visit.  The exact dose of the med may need to be adjusted over time. We will ask your primary care doctor or endocrinologist (a doctor that treats diseases that affect your glands) to check blood tests 6 weeks after starting your thyroid hormone. Your dose of the thyroid hormone med will be adjusted as needed. Sometimes, your endocrinologist does not want you to start taking this hormone until the tests come back showing you do not have cancer. Your surgeon will let you know.    Post Operative Instructions    Head of Bed:  Please raise the head of your bed 30-45 degrees or sleep in a recliner for the first 3-4 days to decrease swelling. The skin above the incision may look swollen after lying down for a few hours.    Activity:  No straining, heavy lifting, or vigorous exercise for 2 weeks after surgery.  Most patients are able to return to work 1 week after surgery unless their job requires the above activities.    Diet:  You may eat your regular diet after surgery.    Pain:    Your pain can be mild to moderate the first 24 - 48 hours. The pain usually lessens after that. Many patients complain more about a sore throat from the breathing tube used during surgery then about pain from the surgery itself. Your pain will get better in 1-2 days and is best treated with throat lozenges.     You may not need strong narcotic pain medication. The sooner you reduce your narcotic pain medication use, the faster you will heal. As your pain lessens, try using extra-strength acetaminophen (Tylenol) instead of your narcotic med. It is best to reduce your pain to a level you can manage, rather than to get rid of the pain completely.  Please start at a lower of narcotic pain med, and increase the dose only if the pain remains uncontrolled. Decrease the dose if the side effects are too severe.   Do not drive, operate dangerous machinery, or do anything dangerous if you are taking  narcotic pain medication (such as oxycodone, hydrocodone, morphine, etc.) This medication affects your reflexes and responses, just like alcohol.     When to Call Your Surgeon:  If you have    1. Any concerns. We would much rather that you call your surgeon then worry at home, or get into trouble.    2. Any numbness or tingling around your mouth or in both of your fingers or toes. This may be a sign of low blood calcium levels.  If you experience this, take 2000mg of TUMS ULTRA (2 tabs) with a glass of milk.  If your symptoms last for 45 minutes then you should have someone drive you to the [facility].  . If you have muscle cramping and or curling of your fingers or toes, this could be even more seriously low blood calcium levels. THIS CAN BE A LIFE-THREATENING PROBLEM. If you experience this, take 2000mg of TUMS ULTRA (2 tabs) with a glass of milk then have someone drive you to the [facility]. You must go have your blood calcium levels drawn immediately.  If you live too far away, go to a nearby ER. Have the ER staff call your Dr. Baugh after drawing your blood calcium and giving you extra calcium if needed. Bring these postoperative instructions with you to show to them. If your blood calcium gets too low, you could have seizures or your heart could stop, so you must take this seriously!     3. Fever over 101.5 degrees F.     4. Foul smelling discharge from your incision.    5. Large amount of bleeding.    6. More than expected swelling of your neck.    7. Increase warmth or redness around the incision.     8. Problems urinating.    9. Pain that continues to increase instead of decrease.    Wound Care:  DERMABOND® PRINEO® Skin Closure System    Dr. Baugh will use DERMABOND PRINEO System to close your wound.  It is extremely rare that stitches are placed that need to be removed.  Dr. Baugh will let you know if you are an exception to this, but in the majority of cases, the stitches are placed  beneath the skin and are absorbable.   DERMABOND PRINEO System is the combination of a mesh and a liquid adhesive  that allows the incision or wound to be held together during the healing process.   DERMABOND PRINEO System should remain in place anywhere between 7-14 days. In most cases, DERMABOND PRINEO System is easily removed with little or no discomfort.   In the event that you notice that DERMABOND PRINEO System is beginning to  loosen or may be coming off earlier, simply trim any loose edges   The following information is provided to help you understand how to care for your  incision and is based on the FDA-cleared product labeling. You should always follow  the instructions of your healthcare provider.    After DERMABOND® PRINEO® Skin Closure System has been applied  Caring for your wound  Things to know  · Bathing or showering  · You may bathe or shower the same day as surgery unless directed otherwise by Dr. Baugh.   Do not soak or scrub your incision or wound.  After showering or bathing, gently blot your incision or wound dry with a soft towel.  · If you experience any redness, swelling, discomfort, warmth or pus, contact Dr. Baugh and he or she will determine how your incision or wound is healing and take the necessary steps to address any issues.  · Exercise  · Do not engage in strenuous exercise that may cause additional stress on your incision or wound for 2 weeks after surgery.  Light exercise such as going for a walk is recommended as it reduces the chances of clots in the legs or pneumonia.     Removing DERMABOND PRINEO System   If the DERMABOND PRINEO System has not come off after 14 days, you may carefully peel off the DERMABOND PRINEO System.   Prior to removal, do not scratch, rub or pick at the mesh. This may  loosen the adhesive and mesh before the skin is healed.      Ointments or liquids  Topical ointments, liquids or any other product (other than dry  bandages) should not be applied to  the incision while DERMABOND  PRINEO System is in place. These may loosen DERMABOND PRINEO  System from the skin before it has completely healed.

## 2017-09-01 NOTE — ANESTHESIA POSTPROCEDURE EVALUATION
"Anesthesia Post Evaluation    Patient: Josey Flores    Procedure(s) Performed: Procedure(s) (LRB):  THYROIDECTOMY (Bilateral)    Final Anesthesia Type: general  Patient location during evaluation: PACU  Patient participation: Yes- Able to Participate  Level of consciousness: awake and alert  Post-procedure vital signs: reviewed and stable  Pain management: adequate  Airway patency: patent  PONV status at discharge: No PONV  Anesthetic complications: no      Cardiovascular status: hemodynamically stable  Respiratory status: unassisted  Hydration status: euvolemic  Follow-up not needed.        Visit Vitals  BP (!) 128/59 (BP Location: Left arm, Patient Position: Lying)   Pulse 80   Temp 36.4 °C (97.6 °F) (Oral)   Resp 18   Ht 5' 7" (1.702 m)   Wt 97 kg (213 lb 11.8 oz)   LMP  (LMP Unknown)   SpO2 (!) 94%   Breastfeeding? No   BMI 33.48 kg/m²       Pain/Shraddha Score: Pain Assessment Performed: Yes (8/31/2017  6:15 PM)  Presence of Pain: denies (8/31/2017  6:15 PM)  Pain Rating Prior to Med Admin: 8 (8/31/2017  8:54 PM)  Shraddha Score: 8 (8/31/2017  3:00 PM)      "

## 2017-09-01 NOTE — DISCHARGE SUMMARY
Discharge Note    SUMMARY     Admit Date: 8/31/2017    Discharge Date and Time: No discharge date for patient encounter.    Attending Physician: Zaid Baugh MD     Discharge Provider: Shelia Prince    Final Diagnosis: Post-Op Diagnosis Codes:     * Malignant neoplasm of thyroid gland [C73]    Disposition: Home or Self Care    Follow Up/Patient Instructions:     Medications:  Reconciled Home Medications: Current Discharge Medication List      START taking these medications    Details   oxycodone-acetaminophen (PERCOCET) 5-325 mg per tablet Take 1 tablet by mouth every 4 (four) hours as needed for Pain.  Qty: 30 tablet, Refills: 0         CONTINUE these medications which have NOT CHANGED    Details   albuterol 90 mcg/actuation inhaler Inhale 2 puffs into the lungs every 6 (six) hours.  Qty: 1 Inhaler, Refills: 12    Associated Diagnoses: Chronic obstructive pulmonary disease, unspecified COPD type      alprazolam (XANAX) 0.5 MG tablet Take 1 tablet (0.5 mg total) by mouth 2 (two) times daily as needed for Insomnia or Anxiety.  Qty: 60 tablet, Refills: 0    Associated Diagnoses: Malignant neoplasm of overlapping sites of right breast in female, estrogen receptor positive; Anxiety; Malignant neoplasm metastatic to lymph node of axilla; Secondary cancer of bone; Neoplasm related pain; Depression, unspecified depression type      buPROPion (WELLBUTRIN) 100 MG tablet Take 1 tablet (100 mg total) by mouth 2 (two) times daily.  Qty: 60 tablet, Refills: 11    Associated Diagnoses: Depression, unspecified depression type      citalopram (CELEXA) 20 MG tablet Take 1 tablet (20 mg total) by mouth once daily.  Qty: 30 tablet, Refills: 2    Associated Diagnoses: Depression, unspecified depression type; Malignant neoplasm of overlapping sites of right female breast; Malignant neoplasm metastatic to lymph node of axilla; Secondary cancer of bone; Anxiety; Neoplasm related pain      ergocalciferol (VITAMIN D2) 50,000 unit Cap Take  5,000 Units by mouth once daily at 6am.       furosemide (LASIX) 20 MG tablet Take 1 tablet (20 mg total) by mouth once daily.  Qty: 30 tablet, Refills: 1    Associated Diagnoses: Malignant neoplasm of overlapping sites of right female breast; Secondary cancer of bone; Malignant neoplasm metastatic to lymph node of axilla; Hidradenitis      hydrocodone-acetaminophen 7.5-325mg (NORCO) 7.5-325 mg per tablet Take 1 tablet by mouth every 6 (six) hours as needed for Pain.  Qty: 60 tablet, Refills: 0    Associated Diagnoses: Malignant neoplasm of overlapping sites of right breast in female, estrogen receptor positive; Anxiety; Malignant neoplasm metastatic to lymph node of axilla; Secondary cancer of bone; Neoplasm related pain      IBRANCE 125 mg Cap TAKE 1 CAPSULE (125 MG) DAILY FOR 21 DAYS, FOLLOWED BY 7 DAY REST PERIOD TO COMPLETE A 28 DAY TREATMENT CYCLE  Qty: 21 capsule, Refills: 2    Associated Diagnoses: Malignant neoplasm of overlapping sites of right female breast; Malignant neoplasm metastatic to lymph node of axilla; Secondary cancer of bone; Neoplasm related pain      !! letrozole (FEMARA) 2.5 mg Tab TAKE ONE TABLET BY MOUTH ONCE DAILY  Qty: 30 tablet, Refills: 2    Associated Diagnoses: Malignant neoplasm of overlapping sites of right female breast; Malignant neoplasm metastatic to lymph node of axilla; Secondary cancer of bone; Neoplasm related pain      multivitamin (THERAGRAN) per tablet Take 1 tablet by mouth once daily.      ondansetron (ZOFRAN) 8 MG tablet Take 1 tablet (8 mg total) by mouth every 12 (twelve) hours as needed for Nausea.  Qty: 30 tablet, Refills: 2    Associated Diagnoses: Malignant neoplasm of overlapping sites of right breast in female, estrogen receptor positive; Nausea      potassium chloride SA (K-DUR,KLOR-CON) 20 MEQ tablet Take 1 tablet (20 mEq total) by mouth once daily.  Qty: 30 tablet, Refills: 1    Associated Diagnoses: Malignant neoplasm of overlapping sites of right female  breast; Secondary cancer of bone; Malignant neoplasm metastatic to lymph node of axilla; Hidradenitis      prochlorperazine (COMPAZINE) 5 MG tablet Take 1 tablet (5 mg total) by mouth every 6 (six) hours as needed for Nausea.  Qty: 30 tablet, Refills: 1    Associated Diagnoses: Malignant neoplasm of overlapping sites of right breast in female, estrogen receptor positive; Nausea      fentaNYL (DURAGESIC) 50 mcg/hr Place 1 patch onto the skin every 72 hours.  Qty: 10 patch, Refills: 0    Associated Diagnoses: Malignant neoplasm of overlapping sites of right female breast; Secondary cancer of bone; Malignant neoplasm metastatic to lymph node of axilla; Hidradenitis      !! letrozole (FEMARA) 2.5 mg Tab Take 1 tablet (2.5 mg total) by mouth once daily.  Qty: 30 tablet, Refills: 3    Associated Diagnoses: Malignant neoplasm of overlapping sites of right breast in female, estrogen receptor positive; Malignant neoplasm metastatic to lymph node of axilla; Secondary cancer of bone; Neoplasm related pain; Anxiety; Depression, unspecified depression type      varenicline (CHANTIX STARTING MONTH BOX) 0.5 mg (11)- 1 mg (42) tablet Take one 0.5mg tab by mouth once daily X3 days,then increase to one 0.5mg tab twice daily X4 days,then increase to one 1mg tab twice daily  Qty: 1 Package, Refills: 0    Associated Diagnoses: Smoking      varenicline (CHANTIX) 1 mg Tab Take 1 tablet (1 mg total) by mouth 2 (two) times daily.  Qty: 60 tablet, Refills: 3    Associated Diagnoses: Smoking       !! - Potential duplicate medications found. Please discuss with provider.          Discharge Procedure Orders  Diet general     Lifting restrictions   Scheduling Instructions: No heavy lifting     Sponge bath only until clinic visit     Call MD for:  temperature >100.4     Call MD for:  persistent nausea and vomiting     Call MD for:  severe uncontrolled pain     Call MD for:  difficulty breathing, headache or visual disturbances     Call MD for:   redness, tenderness, or signs of infection (pain, swelling, redness, odor or green/yellow discharge around incision site)     No dressing needed       Follow-up Information     Zaid Baugh MD In 1 week.    Specialty:  Otolaryngology  Contact information:  [address]  [place] [address] [phone]

## 2017-09-05 DIAGNOSIS — C79.51 SECONDARY CANCER OF BONE: ICD-10-CM

## 2017-09-05 DIAGNOSIS — C50.811 MALIGNANT NEOPLASM OF OVERLAPPING SITES OF RIGHT BREAST IN FEMALE, ESTROGEN RECEPTOR POSITIVE: ICD-10-CM

## 2017-09-05 DIAGNOSIS — Z17.0 MALIGNANT NEOPLASM OF OVERLAPPING SITES OF RIGHT BREAST IN FEMALE, ESTROGEN RECEPTOR POSITIVE: ICD-10-CM

## 2017-09-05 DIAGNOSIS — C77.3 MALIGNANT NEOPLASM METASTATIC TO LYMPH NODE OF AXILLA: ICD-10-CM

## 2017-09-05 DIAGNOSIS — F41.9 ANXIETY: ICD-10-CM

## 2017-09-05 DIAGNOSIS — G89.3 NEOPLASM RELATED PAIN: ICD-10-CM

## 2017-09-05 DIAGNOSIS — F32.A DEPRESSION, UNSPECIFIED DEPRESSION TYPE: ICD-10-CM

## 2017-09-05 RX ORDER — HYDROCODONE BITARTRATE AND ACETAMINOPHEN 7.5; 325 MG/1; MG/1
1 TABLET ORAL EVERY 6 HOURS PRN
Qty: 60 TABLET | Refills: 0 | Status: SHIPPED | OUTPATIENT
Start: 2017-09-05 | End: 2017-09-20 | Stop reason: SDUPTHER

## 2017-09-05 RX ORDER — ALPRAZOLAM 0.5 MG/1
0.5 TABLET ORAL 2 TIMES DAILY PRN
Qty: 60 TABLET | Refills: 0 | Status: SHIPPED | OUTPATIENT
Start: 2017-09-05 | End: 2017-10-05 | Stop reason: SDUPTHER

## 2017-09-10 ENCOUNTER — HOSPITAL ENCOUNTER (EMERGENCY)
Facility: HOSPITAL | Age: 47
Discharge: HOME | End: 2017-09-10
Attending: EMERGENCY MEDICINE
Payer: MEDICAID

## 2017-09-10 VITALS
WEIGHT: 210 LBS | BODY MASS INDEX: 32.96 KG/M2 | DIASTOLIC BLOOD PRESSURE: 68 MMHG | HEIGHT: 67 IN | RESPIRATION RATE: 18 BRPM | HEART RATE: 67 BPM | TEMPERATURE: 98 F | SYSTOLIC BLOOD PRESSURE: 132 MMHG | OXYGEN SATURATION: 98 %

## 2017-09-10 DIAGNOSIS — M25.50 ARTHRALGIA, UNSPECIFIED JOINT: ICD-10-CM

## 2017-09-10 DIAGNOSIS — R05.9 COUGH: ICD-10-CM

## 2017-09-10 DIAGNOSIS — C73 THYROID CANCER: ICD-10-CM

## 2017-09-10 DIAGNOSIS — C50.919 MALIGNANT NEOPLASM OF FEMALE BREAST, UNSPECIFIED ESTROGEN RECEPTOR STATUS, UNSPECIFIED LATERALITY, UNSPECIFIED SITE OF BREAST: Primary | ICD-10-CM

## 2017-09-10 LAB
ANION GAP SERPL CALC-SCNC: 13 MMOL/L
BACTERIA #/AREA URNS HPF: NORMAL /HPF
BASOPHILS # BLD AUTO: 0.03 K/UL
BASOPHILS NFR BLD: 0.5 %
BILIRUB UR QL STRIP: NEGATIVE
BUN SERPL-MCNC: 8 MG/DL
CALCIUM SERPL-MCNC: 9.6 MG/DL
CHLORIDE SERPL-SCNC: 101 MMOL/L
CLARITY UR: CLEAR
CO2 SERPL-SCNC: 24 MMOL/L
COLOR UR: YELLOW
CREAT SERPL-MCNC: 1.2 MG/DL
DIFFERENTIAL METHOD: ABNORMAL
EOSINOPHIL # BLD AUTO: 0.1 K/UL
EOSINOPHIL NFR BLD: 1.7 %
ERYTHROCYTE [DISTWIDTH] IN BLOOD BY AUTOMATED COUNT: 15.8 %
EST. GFR  (AFRICAN AMERICAN): >60 ML/MIN/1.73 M^2
EST. GFR  (NON AFRICAN AMERICAN): 54 ML/MIN/1.73 M^2
GLUCOSE SERPL-MCNC: 93 MG/DL
GLUCOSE UR QL STRIP: NEGATIVE
HCT VFR BLD AUTO: 38 %
HGB BLD-MCNC: 13.2 G/DL
HGB UR QL STRIP: ABNORMAL
KETONES UR QL STRIP: NEGATIVE
LEUKOCYTE ESTERASE UR QL STRIP: NEGATIVE
LYMPHOCYTES # BLD AUTO: 3.2 K/UL
LYMPHOCYTES NFR BLD: 55 %
MCH RBC QN AUTO: 35.4 PG
MCHC RBC AUTO-ENTMCNC: 34.7 G/DL
MCV RBC AUTO: 102 FL
MICROSCOPIC COMMENT: NORMAL
MONOCYTES # BLD AUTO: 0.3 K/UL
MONOCYTES NFR BLD: 4.7 %
NEUTROPHILS # BLD AUTO: 2.2 K/UL
NEUTROPHILS NFR BLD: 38.1 %
NITRITE UR QL STRIP: NEGATIVE
PH UR STRIP: 6 [PH] (ref 5–8)
PLATELET # BLD AUTO: 291 K/UL
PMV BLD AUTO: 11.3 FL
POTASSIUM SERPL-SCNC: 4 MMOL/L
PROT UR QL STRIP: NEGATIVE
RBC # BLD AUTO: 3.73 M/UL
RBC #/AREA URNS HPF: 2 /HPF (ref 0–4)
SODIUM SERPL-SCNC: 138 MMOL/L
SP GR UR STRIP: <=1.005 (ref 1–1.03)
SQUAMOUS #/AREA URNS HPF: 0 /HPF
URN SPEC COLLECT METH UR: ABNORMAL
UROBILINOGEN UR STRIP-ACNC: NEGATIVE EU/DL
WBC # BLD AUTO: 5.75 K/UL
WBC #/AREA URNS HPF: 0 /HPF (ref 0–5)

## 2017-09-10 PROCEDURE — 99284 EMERGENCY DEPT VISIT MOD MDM: CPT | Mod: 25

## 2017-09-10 PROCEDURE — 96374 THER/PROPH/DIAG INJ IV PUSH: CPT

## 2017-09-10 PROCEDURE — 80048 BASIC METABOLIC PNL TOTAL CA: CPT

## 2017-09-10 PROCEDURE — 81000 URINALYSIS NONAUTO W/SCOPE: CPT

## 2017-09-10 PROCEDURE — 63600175 PHARM REV CODE 636 W HCPCS: Performed by: EMERGENCY MEDICINE

## 2017-09-10 PROCEDURE — 96376 TX/PRO/DX INJ SAME DRUG ADON: CPT

## 2017-09-10 PROCEDURE — 85025 COMPLETE CBC W/AUTO DIFF WBC: CPT

## 2017-09-10 RX ORDER — MORPHINE SULFATE 4 MG/ML
4 INJECTION, SOLUTION INTRAMUSCULAR; INTRAVENOUS
Status: COMPLETED | OUTPATIENT
Start: 2017-09-10 | End: 2017-09-10

## 2017-09-10 RX ADMIN — MORPHINE SULFATE 4 MG: 4 INJECTION, SOLUTION INTRAMUSCULAR; INTRAVENOUS at 10:09

## 2017-09-10 RX ADMIN — MORPHINE SULFATE 4 MG: 4 INJECTION, SOLUTION INTRAMUSCULAR; INTRAVENOUS at 09:09

## 2017-09-11 VITALS
RESPIRATION RATE: 18 BRPM | DIASTOLIC BLOOD PRESSURE: 68 MMHG | WEIGHT: 213.75 LBS | HEART RATE: 73 BPM | HEIGHT: 67 IN | BODY MASS INDEX: 33.55 KG/M2 | OXYGEN SATURATION: 93 % | SYSTOLIC BLOOD PRESSURE: 112 MMHG | TEMPERATURE: 98 F

## 2017-09-11 NOTE — ED PROVIDER NOTES
SCRIBE #1 NOTE: I, Adilene Parish, am scribing for, and in the presence of, Braeden Kern MD. I have scribed the HPI, ROS, and PEx.     SCRIBE #2 NOTE: I, Hali Salgado, am scribing for, and in the presence of,  Braeden Kern MD. I have scribed the remaining portions of the note not scribed by Scribe #1.     History      Chief Complaint   Patient presents with    generalized pain     pt reports pain all over, worsening over the past 2 wks; hx bone ca, breast ca, thyroid ca       Review of patient's allergies indicates:   Allergen Reactions    Vancomycin analogues Itching        HPI   HPI    9/10/2017, 7:51 PM   History obtained from the patient      History of Present Illness: Josey Flores is a 46 y.o. female patient w/ bone, lymph node, and thyroid cancer who presents to the Emergency Department c/o BLE pain which onset gradually 1.5 weeks ago. She reports pain is similar to pain associated with her bone cancer but pain has worsened. Pain originates in her hips and extends down to her BLE. Symptoms are constant and moderate in severity. She states that she is in remission right now. Pain is being treated with Norco 7 mg but has not provided any relief lately. No mitigating or exacerbating factors reported. Pt reports having a cough x2 weeks and that she has chest and side pain when coughing. Patient denies fever, chills, N/V, SOB, extremity weakness/numbness, gait problem, calf pain, swelling, and all other sxs at this time. Pt's cancer is followed by Dr. Delong (Heme/Onc). She has apn 9/14. No further complaints or concerns at this time.     Arrival mode: Personal vehicle    PCP: Aileen Sadler MD       Past Medical History:  Past Medical History:   Diagnosis Date    Anxiety     Cancer     metastatic-lymph nodes, bone, and thyroid     COPD (chronic obstructive pulmonary disease)     Depression        Past Surgical History:  Past Surgical History:   Procedure Laterality Date    ABSCESS  DRAINAGE      bilateral axilla    ADENOIDECTOMY      APPENDECTOMY      CHOLECYSTECTOMY      SKIN GRAFT  06/16/2017    TONSILLECTOMY           Family History:  History reviewed. No pertinent family history.    Social History:  Social History     Social History Main Topics    Smoking status: Current Every Day Smoker     Packs/day: 1.00     Types: Cigarettes     Last attempt to quit: 6/11/2017    Smokeless tobacco: Never Used      Comment: no smoking after mn prior to surgery    Alcohol use No    Drug use: No    Sexual activity: Yes     Partners: Male       ROS   Review of Systems   Constitutional: Negative for chills, diaphoresis, fatigue and fever.   HENT: Negative for sore throat.    Respiratory: Positive for cough. Negative for shortness of breath.    Cardiovascular: Negative for chest pain, palpitations and leg swelling.   Gastrointestinal: Negative for abdominal pain, diarrhea, nausea and vomiting.   Genitourinary: Negative for dysuria.   Musculoskeletal: Positive for arthralgias. Negative for back pain.   Skin: Negative for rash.   Neurological: Negative for dizziness, weakness, light-headedness and headaches.   Hematological: Does not bruise/bleed easily.   All other systems reviewed and are negative.      Physical Exam      Initial Vitals [09/10/17 1919]   BP Pulse Resp Temp SpO2   123/68 76 18 98.2 °F (36.8 °C) 98 %      MAP       86.33          Physical Exam  Nursing Notes and Vital Signs Reviewed.  Constitutional: Patient is in no acute distress. Awake and alert. Well-developed and well-nourished.  Head: Atraumatic. Normocephalic.  Eyes: Conjunctivae are not pale. No scleral icterus.  ENT: Mucous membranes are moist. Oropharynx is clear and symmetric.    Neck: Supple. Full ROM. No lymphadenopathy.  Cardiovascular: Regular rate. Regular rhythm. No murmurs, rubs, or gallops. Distal pulses are 2+ and symmetric.  Pulmonary/Chest: No respiratory distress. Clear to auscultation bilaterally. No wheezing,  "rales, or rhonchi.  Abdominal: Soft and non-distended.  There is no tenderness.  No rebound, guarding, or rigidity.  Good bowel sounds.    Musculoskeletal: Moves all extremities. No obvious deformities. No edema. No calf tenderness.  Skin: Warm and dry.  Neurological:  Alert, awake, and appropriate.  Normal speech. Grossly neurologically intact.  Psychiatric: Normal affect. Good eye contact. Appropriate in content.    ED Course    Procedures  ED Vital Signs:  Vitals:    09/10/17 1919   BP: 123/68   Pulse: 76   Resp: 18   Temp: 98.2 °F (36.8 °C)   TempSrc: Oral   SpO2: 98%   Weight: 95.3 kg (210 lb)   Height: 5' 7" (1.702 m)       Abnormal Lab Results:  Labs Reviewed   CBC W/ AUTO DIFFERENTIAL - Abnormal; Notable for the following:        Result Value    RBC 3.73 (*)      (*)     MCH 35.4 (*)     RDW 15.8 (*)     Lymph% 55.0 (*)     All other components within normal limits   URINALYSIS - Abnormal; Notable for the following:     Specific Gravity, UA <=1.005 (*)     Occult Blood UA 1+ (*)     All other components within normal limits   BASIC METABOLIC PANEL - Abnormal; Notable for the following:     eGFR if non  54 (*)     All other components within normal limits   URINALYSIS MICROSCOPIC        All Lab Results:  Results for orders placed or performed during the hospital encounter of 09/10/17   CBC auto differential   Result Value Ref Range    WBC 5.75 3.90 - 12.70 K/uL    RBC 3.73 (L) 4.00 - 5.40 M/uL    Hemoglobin 13.2 12.0 - 16.0 g/dL    Hematocrit 38.0 37.0 - 48.5 %     (H) 82 - 98 fL    MCH 35.4 (H) 27.0 - 31.0 pg    MCHC 34.7 32.0 - 36.0 g/dL    RDW 15.8 (H) 11.5 - 14.5 %    Platelets 291 150 - 350 K/uL    MPV 11.3 9.2 - 12.9 fL    Gran # 2.2 1.8 - 7.7 K/uL    Lymph # 3.2 1.0 - 4.8 K/uL    Mono # 0.3 0.3 - 1.0 K/uL    Eos # 0.1 0.0 - 0.5 K/uL    Baso # 0.03 0.00 - 0.20 K/uL    Gran% 38.1 38.0 - 73.0 %    Lymph% 55.0 (H) 18.0 - 48.0 %    Mono% 4.7 4.0 - 15.0 %    Eosinophil% 1.7 0.0 " - 8.0 %    Basophil% 0.5 0.0 - 1.9 %    Differential Method Automated    Urinalysis   Result Value Ref Range    Specimen UA Urine, Clean Catch     Color, UA Yellow Yellow, Straw, [place]    Appearance, UA Clear Clear    pH, UA 6.0 5.0 - 8.0    Specific Gravity, UA <=1.005 (A) 1.005 - 1.030    Protein, UA Negative Negative    Glucose, UA Negative Negative    Ketones, UA Negative Negative    Bilirubin (UA) Negative Negative    Occult Blood UA 1+ (A) Negative    Nitrite, UA Negative Negative    Urobilinogen, UA Negative <2.0 EU/dL    Leukocytes, UA Negative Negative   Urinalysis Microscopic   Result Value Ref Range    RBC, UA 2 0 - 4 /hpf    WBC, UA 0 0 - 5 /hpf    Bacteria, UA None None-Occ /hpf    Squam Epithel, UA 0 /hpf    Microscopic Comment SEE COMMENT    Basic metabolic panel   Result Value Ref Range    Sodium 138 136 - 145 mmol/L    Potassium 4.0 3.5 - 5.1 mmol/L    Chloride 101 95 - 110 mmol/L    CO2 24 23 - 29 mmol/L    Glucose 93 70 - 110 mg/dL    BUN, Bld 8 6 - 20 mg/dL    Creatinine 1.2 0.5 - 1.4 mg/dL    Calcium 9.6 8.7 - 10.5 mg/dL    Anion Gap 13 8 - 16 mmol/L    eGFR if African American >60 >60 mL/min/1.73 m^2    eGFR if non African American 54 (A) >60 mL/min/1.73 m^2       Imaging Results:  Imaging Results          X-Ray Chest 1 View (Final result)  Result time 09/10/17 21:18:44    Final result by Wil Gillespie MD (09/10/17 21:18:44)                 Impression:          1.  Negative for acute process involving the chest.  2.  Radiodensities overlie the thyroid bed.  Has the patient had interval thyroid surgery?  3.  Stable findings as noted above.      Electronically signed by: WIL GILLESPIE MD  Date:     09/10/17  Time:    21:18              Narrative:    Portable Chest x-ray    Clinical Indication: Cough.       Findings:     Comparison made to multiple prior studies dating back to 03/02/2015.  Study is lordotic in position.     The lungs are clear. The cardiac silhouette size is normal. The trachea  is midline and the mediastinal width is normal. Negative for focal infiltrate, effusion or pneumothorax. Pulmonary vasculature is normal. Negative for osseous abnormalities. There is old granulomatous disease. There are calcifications of the aortic knob and tortuosity of the descending thoracic aorta. There are degenerative changes of spine and both shoulder girdles.    Radiodensities overlie the thyroid bed region.                               The Emergency Provider reviewed the vital signs and test results, which are outlined above.    ED Discussion     8:44 PM: Reviewed Dr. Delong's 8/10/2017 office visit note.    10:02 PM: Re-evaluated pt. Pt is AAOx3 and in NAD. D/w pt all pertinent results. All questions answered.    10:59 PM: Reassessed pt at this time.  Pt states her condition has improved at this time. Pt appears much improved. Discussed with pt all pertinent ED information and results. Discussed pt dx and plan of tx. Pt is to call Dr. Delong in the AM and schedule a f/u appointment with him. Pt is to return for any worsening sxs. All questions and concerns were addressed at this time. Pt expresses understanding of information and instructions, and is comfortable with plan to discharge. Pt is stable for discharge.        ED Medication(s):  Medications   morphine injection 4 mg (4 mg Intravenous Given 9/10/17 2119)   morphine injection 4 mg (4 mg Intravenous Given 9/10/17 2239)       New Prescriptions    No medications on file            Medical Decision Making    Medical Decision Making:   History:   Old Medical Records: I decided to obtain old medical records.  Clinical Tests:   Lab Tests: Reviewed and Ordered  Radiological Study: Reviewed and Ordered           Scribe Attestation:   Scribe #1: I performed the above scribed service and the documentation accurately describes the services I performed. I attest to the accuracy of the note.    Attending:   Physician Attestation Statement for Scribe #1:  I, Braeden Kern MD, personally performed the services described in this documentation, as scribed by Adilene Parish, in my presence, and it is both accurate and complete.       Scribe Attestation:   Scribe #2: I performed the above scribed service and the documentation accurately describes the services I performed. I attest to the accuracy of the note.    Attending Attestation:           Physician Attestation for Scribe:    Physician Attestation Statement for Scribe #2: I, Braeden Kern MD, reviewed documentation, as scribed by Hali Salgado in my presence, and it is both accurate and complete. I also acknowledge and confirm the content of the note done by Scribe #1.          Clinical Impression       ICD-10-CM ICD-9-CM   1. Malignant neoplasm of female breast, unspecified estrogen receptor status, unspecified laterality, unspecified site of breast C50.919 174.9   2. Cough R05 786.2   3. Thyroid cancer C73 193   4. Arthralgia, unspecified joint M25.50 719.40       Disposition:   Disposition: Discharged  Condition: Stable         Braeden Kern MD  09/11/17 0548

## 2017-09-14 ENCOUNTER — INFUSION (OUTPATIENT)
Dept: INFUSION THERAPY | Facility: HOSPITAL | Age: 47
End: 2017-09-14
Attending: INTERNAL MEDICINE
Payer: MEDICAID

## 2017-09-14 ENCOUNTER — TELEPHONE (OUTPATIENT)
Dept: HEMATOLOGY/ONCOLOGY | Facility: CLINIC | Age: 47
End: 2017-09-14

## 2017-09-14 ENCOUNTER — OFFICE VISIT (OUTPATIENT)
Dept: HEMATOLOGY/ONCOLOGY | Facility: CLINIC | Age: 47
End: 2017-09-14
Payer: MEDICAID

## 2017-09-14 VITALS
TEMPERATURE: 97 F | BODY MASS INDEX: 34.1 KG/M2 | HEIGHT: 67 IN | HEART RATE: 78 BPM | SYSTOLIC BLOOD PRESSURE: 113 MMHG | WEIGHT: 217.25 LBS | OXYGEN SATURATION: 98 % | DIASTOLIC BLOOD PRESSURE: 70 MMHG

## 2017-09-14 DIAGNOSIS — C50.811 MALIGNANT NEOPLASM OF OVERLAPPING SITES OF RIGHT BREAST IN FEMALE, ESTROGEN RECEPTOR POSITIVE: Primary | ICD-10-CM

## 2017-09-14 DIAGNOSIS — C50.811 MALIGNANT NEOPLASM OF OVERLAPPING SITES OF RIGHT BREAST IN FEMALE, ESTROGEN RECEPTOR POSITIVE: ICD-10-CM

## 2017-09-14 DIAGNOSIS — R39.15 URINARY URGENCY: ICD-10-CM

## 2017-09-14 DIAGNOSIS — Z17.0 MALIGNANT NEOPLASM OF OVERLAPPING SITES OF RIGHT BREAST IN FEMALE, ESTROGEN RECEPTOR POSITIVE: Primary | ICD-10-CM

## 2017-09-14 DIAGNOSIS — C73 PAPILLARY THYROID CARCINOMA: ICD-10-CM

## 2017-09-14 DIAGNOSIS — C79.51 SECONDARY CANCER OF BONE: Primary | ICD-10-CM

## 2017-09-14 DIAGNOSIS — E89.0 POSTOPERATIVE HYPOTHYROIDISM: ICD-10-CM

## 2017-09-14 DIAGNOSIS — Z17.0 MALIGNANT NEOPLASM OF OVERLAPPING SITES OF RIGHT BREAST IN FEMALE, ESTROGEN RECEPTOR POSITIVE: ICD-10-CM

## 2017-09-14 DIAGNOSIS — C77.3 MALIGNANT NEOPLASM METASTATIC TO LYMPH NODE OF AXILLA: ICD-10-CM

## 2017-09-14 DIAGNOSIS — C79.51 SECONDARY CANCER OF BONE: ICD-10-CM

## 2017-09-14 DIAGNOSIS — M54.50 ACUTE BILATERAL LOW BACK PAIN WITHOUT SCIATICA: ICD-10-CM

## 2017-09-14 DIAGNOSIS — R19.5 LOOSE STOOLS: ICD-10-CM

## 2017-09-14 PROCEDURE — 99215 OFFICE O/P EST HI 40 MIN: CPT | Mod: 25,S$PBB,, | Performed by: INTERNAL MEDICINE

## 2017-09-14 PROCEDURE — 99999 PR PBB SHADOW E&M-EST. PATIENT-LVL IV: CPT | Mod: PBBFAC,,, | Performed by: INTERNAL MEDICINE

## 2017-09-14 PROCEDURE — 99214 OFFICE O/P EST MOD 30 MIN: CPT | Mod: PBBFAC,25,PO | Performed by: INTERNAL MEDICINE

## 2017-09-14 PROCEDURE — 3008F BODY MASS INDEX DOCD: CPT | Mod: ,,, | Performed by: INTERNAL MEDICINE

## 2017-09-14 PROCEDURE — 96372 THER/PROPH/DIAG INJ SC/IM: CPT

## 2017-09-14 PROCEDURE — 63600175 PHARM REV CODE 636 W HCPCS: Performed by: INTERNAL MEDICINE

## 2017-09-14 RX ORDER — LEVOTHYROXINE SODIUM 125 UG/1
125 TABLET ORAL DAILY
Qty: 30 TABLET | Refills: 0 | Status: SHIPPED | OUTPATIENT
Start: 2017-09-14 | End: 2017-11-01

## 2017-09-14 RX ADMIN — DENOSUMAB 120 MG: 120 INJECTION SUBCUTANEOUS at 11:09

## 2017-09-14 NOTE — TELEPHONE ENCOUNTER
Called and gave message to patient per Dr. Delong below patient verbalized understanding to pickup thyroid medication and start today the call ended.

## 2017-09-14 NOTE — TELEPHONE ENCOUNTER
----- Message from Richard Delong MD sent at 9/14/2017 12:24 PM CDT -----  Please let the patient know that I have sent a prescription for thyroid hormone to her pharmacy.  She needs to pick this up and start taking it from today.  This might also help with her current symptoms.  Thank you.

## 2017-09-14 NOTE — PATIENT INSTRUCTIONS
[facility]  [address]  [address] [address]  [phone] phone     [phone] fax  Hours of Operation: Monday- Friday 8:00am- 5:00pm  After hours phone  [phone]  Hematology / Oncology Physicians on call      Dr. Jake Keith                        Please call with any concerns regarding your appointment today.  FALL PREVENTION   Falls often occur due to slipping, tripping or losing your balance. Here are ways to reduce your risk of falling again.   Was there anything that caused your fall that can be fixed, removed or replaced?   Make your home safe by keeping walkways clear of objects you may trip over.   Use non-slip pads under rugs.   Do not walk in poorly lit areas.   Do not stand on chairs or wobbly ladders.   Use caution when reaching overhead or looking upward. This position can cause a loss of balance.   Be sure your shoes fit properly, have non-slip bottoms and are in good condition.   Be cautious when going up and down stairs, curbs, and when walking on uneven sidewalks.   If your balance is poor, consider using a cane or walker.   If your fall was related to alcohol use, stop or limit alcohol intake.   If your fall was related to use of sleeping medicines, talk to your doctor about this. You may need to reduce your dosage at bedtime if you awaken during the night to go to the bathroom.   To reduce the need for nighttime bathroom trips:   Avoid drinking fluids for several hours before going to bed   Empty your bladder before going to bed   Men can keep a urinal at the bedside   © 7549-0695 [employer] [facility], [address] [place] [id number]. All rights reserved. This information is not intended as a substitute for professional medical care. Always follow your healthcare professional's instructions.  WAYS TO HELP PREVENT INFECTION         WASH YOUR HANDS OFTEN DURING THE DAY, ESPECIALLY BEFORE YOU EAT, AFTER USING  THE BATHROOM, AND AFTER TOUCHING ANIMALS     STAY AWAY FROM PEOPLE WHO HAVE ILLNESSES YOU CAN CATCH; SUCH AS COLDS, FLU, CHICKEN POX     TRY TO AVOID CROWDS     STAY AWAY FROM CHILDREN WHO RECENTLY HAVE RECEIVED LIVE VIRUS VACCINES     MAINTAIN GOOD MOUTH CARE     DO NOT SQUEEZE OR SCRATCH PIMPLES     CLEAN CUTS & SCRAPES RIGHT AWAY AND DAILY UNTIL HEALED WITH WARM WATER, SOAP & AN ANTISEPTIC     AVOID CONTACT WITH LITTER BOXES, BIRD CAGES, & FISH TANKS     AVOID STANDING WATER, IE., BIRD BATHS, FLOWER POTS/VASES, OR HUMIDIFIERS     WEAR GLOVES WHEN GARDENING OR CLEANING UP AFTER OTHERS, ESPECIALLY BABIES & SMALL CHILDREN    DO NOT EAT RAW FISH, SEAFOOD, MEAT, OR EGGSPET Imaging Patient Prep    Non-Diabetic:  No food for 6 hours prior to appointment---Including gum,breath mints, hard candy, etc....  Follow the high protein, low carbohydrate diet below for 24 hours prior to your test.  You may drink ONLY water up to the time of your test.         No other type of liquid is allowed.  Take your medications as scheduled prior to your appointment as long as they are tolerated on an empty stomach.  NO strenuous exercising 24 hours prior to your appointment.      Diabetic:  No food for 6 hours prior to appointment---Including gum, breath mints, hard candy, etc...  Follow the high protein, low carbohydrate diet below for 24 hours prior to your test.  You may ONLY drink water up to the time of your test.         No other type of liquid is allowed.  Diabetics who take ORAL medications should wait until the exam is complete to take them.  Subcutaneous insulin dependent diabetics should have their last infection 2 hours prior to the appointment  Take other regularly scheduled medication prior to appointment as long as they are tolerated on an empty stomach.  NO strenuous exercising 24 hours prior to your appointment.      All Patients:  Wear warm, loose-fitting clothing. The scanners tend to get cool. You will not be  required to remove the clothing prior to the scan.  Allow 2 to 3 hours for the exam.  You must be able to lay still and mostly flat for about 1 hour.  If you need pain medication, take this 1 hour before the scan      Breakfast Diet:                                   Lunch & Dinner Diet:  Eggs any style                                                          Grilled 8oz. Steak or half baked,grilled,broiled  Phillip or Sausage                                                       Chicken breasts or 8oz. Grilled salmon fillet   Water                                                                                 Side items, Select 1  NO Juice                                                                               Asparagus, Broccoli, Mushrooms or  No toast or potatoes                                                              Tossed salad    Foods to Avoid:  *refined sugar     *All fruits        *All fruit juices     *Raisins     *Beets     *Cantaloupe  *Cereal               *Rice cakes    *All breads          *Carrots     *Corn      *Kidney beans    *Muffins              *Potatoes       *Pretzels              *Chips       *Rice       *Tortillas  *Granola             *Oatmeal        *Pasta                 *Peas        *Yams      *All sodas        

## 2017-09-14 NOTE — PROGRESS NOTES
Reason for visit: Right breast carcinoma  Date of Diagnosis: January 12, 2017    HPI:   The patient is a 46-year-old  female who presents to the hematology oncology clinic today for follow up for metastatic right breast carcinoma.    I have reviewed all of the patient's relevant clinical history available in the medical record and have utilized this in my evaluation and management recommendations today.  The patient had 2 separate areas in the right breast that were biopsied area the first was a right breast mass at 11:00 which was 3 cm from the nipple which showed invasive mammary carcinoma.  The invasive carcinoma measured at least 1.2 cm in greatest dimension and appeared high-grade.  This tumor was ER positive/RI positive/HER-2 negative.  The second breast mass was biopsied from the retroperitoneal area lower area and was also positive for invasive mammary carcinoma.  This measured at least 0.4 cm and also appeared high-grade.  The tumor is morphologically very similar to the tumor in the prior specimen.  Receptor studies were not done on this specimen.  The patient also had a palpable right axillary lymph node which was biopsied and showed metastatic mammary carcinoma which measured at least 1.3 cm in greatest dimension.  The patient had self palpated this breast mass.  In addition the patient has a palpable mass in the region of her right elbow which she reports has been present for 9 years but had been slowly growing especially over the past year or 2.  She denies any pain associated with this.    Most recently she is status post total thyroidectomy for papillary thyroid carcinoma.  Today the patient reports that for the past few days she has been having multiple problems including lower back pain, urinary frequency, loose stools, swelling in her legs.  She reports generalized weakness and fatigue.  She is taking norco PRN neoplasm related pain.  She denies any fevers or chills. Denies night sweats.   She denies any melena, hematochezia, hematemesis, hemoptysis or hematuria. She reports nausea. Denies vomiting or abdominal pain. She reports chronic cough.  She continues on ibrance and letrozole. Ibrance was started on 2/21/17.    PAST MEDICAL HISTORY:   1. HSIL on pap smear s/p LEEP    SURGICAL HISTORY:   1.  Tonsillectomy and adenoidectomy  2.  Appendectomy  3.  Cholecystectomy  4.  D&C  5.  Thyroidectomy on August 31, 2017    FAMILY HISTORY: She reports that her maternal aunt had lung cancer in her 70s.  She used to smoke cigarettes.  Her maternal cousin had lung cancer in her 60s.  She used to smoke cigarettes.  She denies any other immediate family members with cancer or bleeding/clotting disorders.    SOCIAL HISTORY: She reports a 45+ pack year smoking history. She quit in May 2017. She drinks wine occasionally.  She denies any history of recreational drug use.  She is engaged and has 2 sons.  She used to work for the [employer].  She lives in [place], [place].     ALLERGIES: [NKDA]    MEDICATIONS: [Medcard has been reviewed and/or reconciled.]    REVIEW OF SYSTEMS:   GENERAL: [No fevers, chills or sweats. Reports fatigue. Reports weight gain. Denies loss of appetite.]  HEENT: [No blurred vision, tinnitus, nasal discharge, sorethroat or dysphagia.]  HEART: [No chest pain, palpitations or shortness of breath.]    LUNGS: [Reports chronic cough. Denies hemoptysis or breathing problems.]  ABDOMEN: [No abdominal pain, nausea, vomiting, diarrhea, constipation or melena.]  GENITOURINARY: [No dysuria, bleeding or malodorous discharge.]  NEURO: [No headache, dizziness or vertigo.]  HEMATOLOGY: [No easy bruising, spontaneous bleeding or blood clots in the past].  MUSCULOSKELETAL: [No arthralgias, myalgias or bone pains.]  SKIN: [No rashes or skin lesions.]  PSYCHIATRY: [No depression. Reports anxiety.]      PHYSICAL EXAMINATION:   VS: Reviewed on nurse's notes.  APPEARANCE: The patient is a well-developed,  well-nourished and well-groomed  female who appears in mild distress from pain. She was accompanied to this clinic visit by her fiance.  HEENT: No scleral icterus. Both external auditory canals clear. No oral ulcers, lesions. Throat clear  HEAD: No sinus tenderness.  NECK: Supple. No palpable lymphadenopathy. Thyroid non-tender, no palpable masses  CHEST: Breath sounds clear bilaterally. No rales. No rhonchi. Unlabored respirations.  BREAST: Deferred today.  CARDIOVASCULAR: Normal S1, S2. Normal rate. Regular rhythm.  ABDOMEN: Bowel sounds normal. No tenderness. No abdominal distention. No hepatomegaly. No splenomegaly.  BACK: Palpable tenderness in the lower back.  LYMPHATIC: No palpable supraclavicular lymphadenopathy. Bilateral axillary healed wounds noted.  EXTREMITIES: No clubbing, cyanosis. Palpable firm 3 cm mass in the right elbow is noted.  1+ edema in bilateral lower extremities.      SKIN: No lesions. No petechiae. No ecchymoses.   NEUROLOGIC: No focal findings. Alert & Oriented x 3. Mood appropriate to affect      LABS:   Reviewed    IMAGING:  Reviewed    IMPRESSION:  1.  Metastatic multifocal infiltrating ductal carcinoma of the right breast with right axillary and bone involvement [ER positive/HI positive/HER-2 negative]  2.  Papillary thyroid carcinoma  3.  Tobacco abuse  4.  Right elbow mass  5.  Anxiety and depression  6.  Chronic cough  7.  Right axillary hydradenitis/cellulitis and left inguinal hydradenitis/cellulitis s/p debridement and skin grafting  8.  Urinary urgency/urinary frequency  9.  Loose stools  10.  Acute low back pain    PLAN:  1.  I had a detailed discussion with the patient previously with regard to the diagnosis, prognosis and treatment options for metastatic multifocal invasive ductal carcinoma of the right breast with right axillary and bone involvement.  2.  I have discussed that at this time her metastatic malignancy is potentially treatable but not curable.  3.   Results of PET/CT scan from 7/12/17 were discussed in detail with the patient today. Findings consistent with treatment response with no FDG avid disease identified with essentially complete resolution of the previously noted right axillary adenopathy and right breast mass.  No FDG avidity associated with osseous lesions. She had persistent significantly FDG avid right thyroid lesion.   4.  The patient was strongly encouraged to quit smoking.  She expressed understanding.  Prescription for Xanax when necessary anxiety was previously given to the patient after full discussion of sedation precautions.    5. FSH and estradiol levels confirm menopausal status. TSH lab was normal.   6.  MRI of the brain on 1/31/17 to evaluate for any evidence of metastatic disease to complete staging workup was negative for metastatic malignancy to the brain. CT head done in March 2017 in ED was also ok.  7. Continue to proceed with ibrance/letrozole.  She appears to be tolerating this well so far with no significant side effects.   8. Her medical insurance denied genetic testing at [facility] with regard to her breast cancer. I will look at alternative lab options where this might be covered.  9. She has been advised to take calcium and vit d supplementation everyday. She is on xgeva injections for treatment of bone involvement by malignancy. Risks/benefits and common side effects discussed and she expressed understanding and agreement. She has dentures.  10. Continue Lasix for treatment of bilateral lower extremity edema.  Supplemental potassium was given as well.  Advised to use compression stockings and elevate legs.  11.  She has follow-up scheduled with Dr. Jason with endocrinology to discuss further evaluation and management recommendations for her newly diagnosed papillary thyroid carcinoma.  She appears to have low-risk disease and likely will not need any adjuvant therapy including radioiodine ablation.  However I will await  Dr. Jason's recommendations with regard to need for adjuvant therapy and surveillance recommendations.  12.  I will proceed with MRI of the thoracic and lumbar spine to evaluate for any evidence of progression of bone disease because of her current acute symptoms related to back pain.  I will also schedule the patient for PET/CT scan to evaluate for any evidence of progression of breast carcinoma.  I will check stool studies.  Patient has been advised to stop Imodium as this may be contributing to her loose stools.  I will also check UA and urine culture for further evaluation of her urinary complaints.  13.  I will start the patient on supplemental levothyroxine today as this might also help her current symptoms.  Dose adjustments with regard to this medication will be deferred to endocrinology.    Return to clinic after above to discuss further management.  She knows to call sooner for any new problems or questions.    Richard Delong MD

## 2017-09-18 ENCOUNTER — OFFICE VISIT (OUTPATIENT)
Dept: OTOLARYNGOLOGY | Facility: CLINIC | Age: 47
End: 2017-09-18
Payer: MEDICAID

## 2017-09-18 VITALS
TEMPERATURE: 99 F | SYSTOLIC BLOOD PRESSURE: 120 MMHG | BODY MASS INDEX: 33.84 KG/M2 | HEART RATE: 98 BPM | WEIGHT: 216.06 LBS | DIASTOLIC BLOOD PRESSURE: 76 MMHG

## 2017-09-18 DIAGNOSIS — C73 PAPILLARY THYROID CARCINOMA: Primary | ICD-10-CM

## 2017-09-18 PROCEDURE — 99999 PR PBB SHADOW E&M-EST. PATIENT-LVL III: CPT | Mod: PBBFAC,,, | Performed by: OTOLARYNGOLOGY

## 2017-09-18 PROCEDURE — 99024 POSTOP FOLLOW-UP VISIT: CPT | Mod: ,,, | Performed by: OTOLARYNGOLOGY

## 2017-09-18 PROCEDURE — 99213 OFFICE O/P EST LOW 20 MIN: CPT | Mod: PBBFAC,PO | Performed by: OTOLARYNGOLOGY

## 2017-09-18 RX ORDER — CALCIUM CARBONATE 400(1000)
2000 TABLET,CHEWABLE ORAL DAILY
COMMUNITY

## 2017-09-18 NOTE — PROGRESS NOTES
Subjective:   Patient: Josey Flores [id number], :1970   Visit date:2017 12:35 PM    Chief Complaint:  Post-op Evaluation (thyroidectomy 17 c/o throat pain )    HPI:  Josey is a 46 y.o. female who is here for follow-up after surgery:    Subjective: No major issues.  Healing well.  Taking Calcium 2g TID and calcitriol. Started on synthroid 125mcg qd.  Complains of diffuse body pain.  She does have some pain with swallowing.     Surgery date: 2017    Operations performed: Total thyroidectomy    Pathology:    FINAL PATHOLOGIC DIAGNOSIS  Thyroid, total thyroidectomy:  Papillary carcinoma, follicular variant, 2 cm, [id number].  Second focus of micropapillary carcinoma adjacent to main tumor.  Two lymph nodes negative for carcinoma.  See note and synoptic report below.  Note: Reviewed by Dr. Lois Ortiz who concurs with the diagnosis.  Synoptic report: Thyroid Gland  Procedure: Total thyroidectomy.  Tumor focality: Multifocal.  Tumor site: Right lobe.  Tumor size: 2 x 2 x 1.5 cm.  Histologic type: Papillary carcinoma, follicular variant, encapsulated/well demarcated, with tumor capsular  invasion.  Margins: Uninvolved by carcinoma.  Angioinvasion: Not identified.  Lymphatic invasion: Not identified.  Extrathyroidal extension: Not identified.  Regional lymph nodes:  -Number of lymph nodes involved: 0.  -Number of lymph nodes examined: 2 (incidental perithyroidal).  Pathologic Stage Classification: [id number].    Cultures:  na    Review of Systems:  -     Allergic/Immunologic: is allergic to vancomycin analogues..  -     Constitutional: Current temp: 99 °F (37.2 °C) (Tympanic)    Her meds, allergies, medical, surgical, social & family histories were reviewed & updated:  -     She has a current medication list which includes the following prescription(s): albuterol, alprazolam, bupropion, citalopram, ergocalciferol, furosemide, hydrocodone-acetaminophen 7.5-325mg, ibrance, letrozole,  letrozole, levothyroxine, multivitamin, ondansetron, potassium chloride sa, prochlorperazine, varenicline, and varenicline.  -     She  has a past medical history of Anxiety; Cancer; COPD (chronic obstructive pulmonary disease); and Depression.   -     She  does not have any pertinent problems on file.   -     She  has a past surgical history that includes Cholecystectomy; Tonsillectomy; Adenoidectomy; Appendectomy; Abscess drainage; and Skin graft (06/16/2017).  -     She  reports that she has been smoking Cigarettes.  She has been smoking about 1.00 pack per day. She has never used smokeless tobacco. She reports that she does not drink alcohol or use drugs.  -     Her family history is not on file.  -     She is allergic to vancomycin analogues.    Objective:     Physical Exam:  Vitals:  /76   Pulse 98   Temp 99 °F (37.2 °C) (Tympanic)   Wt 98 kg (216 lb 0.8 oz)   LMP  (LMP Unknown) Comment: no cycle x3 years  BMI 33.84 kg/m²   General appearance:  Well developed, well nourished, no apparent distress    Surgical site: incision c/d/i.  Voice quality good.   FFOE with normal VC mobility bilaterally    Assessment & Plan:   Josey was seen today for post-op evaluation.    Diagnoses and all orders for this visit:    Papillary thyroid carcinoma      Doing well. She will see Dr. Jason later this week.  We discussed her medical conditions, treatments and plan.  Josey should return to clinic if any issues arise (symptoms worsen or persist), otherwise we will see her back in the clinic only as needed.

## 2017-09-18 NOTE — ASSESSMENT & PLAN NOTE
S/p total thyroidectomy.  Doing well.  She will see Dr. Jason later this week.   We discussed her medical conditions, treatments and plan.  Josey should return to clinic if any issues arise (symptoms worsen or persist), otherwise we will see her back in the clinic only as needed.

## 2017-09-19 ENCOUNTER — TELEPHONE (OUTPATIENT)
Dept: RADIOLOGY | Facility: HOSPITAL | Age: 47
End: 2017-09-19

## 2017-09-19 ENCOUNTER — TELEPHONE (OUTPATIENT)
Dept: INFUSION THERAPY | Facility: HOSPITAL | Age: 47
End: 2017-09-19

## 2017-09-19 NOTE — TELEPHONE ENCOUNTER
----- Message from Richard Delong MD sent at 9/19/2017  9:33 AM CDT -----  Wait for insurance authorization. Thank you  ----- Message -----  From: Lisa Navarro LPN  Sent: 9/19/2017   9:20 AM  To: Richard Delong MD    [facility],     This patient is scheduled for a PET tomorrow, her insurance authorization is still pending. Could you please advise if this PET is medically urgent? Please contact radiology at ext [phone].     Thanks,     Radiology Dept

## 2017-09-20 ENCOUNTER — INITIAL CONSULT (OUTPATIENT)
Dept: ENDOCRINOLOGY | Facility: CLINIC | Age: 47
End: 2017-09-20
Payer: MEDICAID

## 2017-09-20 ENCOUNTER — HOSPITAL ENCOUNTER (OUTPATIENT)
Dept: RADIOLOGY | Facility: HOSPITAL | Age: 47
Discharge: HOME | End: 2017-09-20
Attending: INTERNAL MEDICINE
Payer: MEDICAID

## 2017-09-20 VITALS
WEIGHT: 217.38 LBS | DIASTOLIC BLOOD PRESSURE: 74 MMHG | TEMPERATURE: 97 F | BODY MASS INDEX: 34.12 KG/M2 | HEART RATE: 88 BPM | HEIGHT: 67 IN | SYSTOLIC BLOOD PRESSURE: 108 MMHG

## 2017-09-20 DIAGNOSIS — M54.50 ACUTE BILATERAL LOW BACK PAIN WITHOUT SCIATICA: ICD-10-CM

## 2017-09-20 DIAGNOSIS — Z85.850 HX OF THYROID CANCER: Primary | ICD-10-CM

## 2017-09-20 DIAGNOSIS — C77.3 MALIGNANT NEOPLASM METASTATIC TO LYMPH NODE OF AXILLA: ICD-10-CM

## 2017-09-20 DIAGNOSIS — C50.811 MALIGNANT NEOPLASM OF OVERLAPPING SITES OF RIGHT BREAST IN FEMALE, ESTROGEN RECEPTOR POSITIVE: ICD-10-CM

## 2017-09-20 DIAGNOSIS — C79.51 SECONDARY CANCER OF BONE: ICD-10-CM

## 2017-09-20 DIAGNOSIS — E89.0 POSTOPERATIVE HYPOTHYROIDISM: ICD-10-CM

## 2017-09-20 DIAGNOSIS — E83.51 HYPOCALCEMIA: ICD-10-CM

## 2017-09-20 DIAGNOSIS — Z17.0 MALIGNANT NEOPLASM OF OVERLAPPING SITES OF RIGHT BREAST IN FEMALE, ESTROGEN RECEPTOR POSITIVE: ICD-10-CM

## 2017-09-20 DIAGNOSIS — C73 PAPILLARY THYROID CARCINOMA: ICD-10-CM

## 2017-09-20 DIAGNOSIS — R39.15 URINARY URGENCY: ICD-10-CM

## 2017-09-20 DIAGNOSIS — G89.3 NEOPLASM RELATED PAIN: ICD-10-CM

## 2017-09-20 DIAGNOSIS — F41.9 ANXIETY: ICD-10-CM

## 2017-09-20 PROCEDURE — 99204 OFFICE O/P NEW MOD 45 MIN: CPT | Mod: S$PBB,,, | Performed by: INTERNAL MEDICINE

## 2017-09-20 PROCEDURE — A9585 GADOBUTROL INJECTION: HCPCS | Performed by: INTERNAL MEDICINE

## 2017-09-20 PROCEDURE — 99999 PR PBB SHADOW E&M-EST. PATIENT-LVL III: CPT | Mod: PBBFAC,,, | Performed by: INTERNAL MEDICINE

## 2017-09-20 PROCEDURE — 72157 MRI CHEST SPINE W/O & W/DYE: CPT | Mod: TC

## 2017-09-20 PROCEDURE — 72158 MRI LUMBAR SPINE W/O & W/DYE: CPT | Mod: TC

## 2017-09-20 PROCEDURE — 25500020 PHARM REV CODE 255: Performed by: INTERNAL MEDICINE

## 2017-09-20 PROCEDURE — 3008F BODY MASS INDEX DOCD: CPT | Mod: ,,, | Performed by: INTERNAL MEDICINE

## 2017-09-20 PROCEDURE — 99213 OFFICE O/P EST LOW 20 MIN: CPT | Mod: PBBFAC,PN,25 | Performed by: INTERNAL MEDICINE

## 2017-09-20 RX ORDER — HYDROCODONE BITARTRATE AND ACETAMINOPHEN 7.5; 325 MG/1; MG/1
1 TABLET ORAL EVERY 6 HOURS PRN
Qty: 60 TABLET | Refills: 0 | Status: SHIPPED | OUTPATIENT
Start: 2017-09-20 | End: 2017-10-05 | Stop reason: SDUPTHER

## 2017-09-20 RX ORDER — GADOBUTROL 604.72 MG/ML
9 INJECTION INTRAVENOUS
Status: COMPLETED | OUTPATIENT
Start: 2017-09-20 | End: 2017-09-20

## 2017-09-20 RX ADMIN — GADOBUTROL 9 ML: 604.72 INJECTION INTRAVENOUS at 05:09

## 2017-09-20 NOTE — PROGRESS NOTES
""This note will be shared with the patient"Subjective:       Patient ID: Josey Flores is a 46 y.o. female.  Patient is new to me    Records were reviewed      Chief Complaint: Hypothyroidism (thyroid cancer)      Consultation requested by Dr. Zaid Baugh    HPI    Patient with history of Metastatic multifocal infiltrating ductal carcinoma of the right breast with right axillary and bone involvement [ER positive/NJ positive/HER-2 negative] for which she has received adjuvant therapy with good response with no FDG avid areas remaining regarding her breast cancer, who was found to have an FDG avid area on PET scan in the right thyroid, FNA was suspicious for papillary thyroid cancer, she subsequently underwent a total thyroidectomy performed by Dr. Zaid Baugh on August 31, 2017 and final pathology showed the following:  Thyroid, total thyroidectomy:  Papillary carcinoma, follicular variant, 2 cm, [id number].  Second focus of micropapillary carcinoma adjacent to main tumor.  Two lymph nodes negative for carcinoma.  See note and synoptic report below.  Note: Reviewed by Dr. Lois Ortiz who concurs with the diagnosis.  Synoptic report: Thyroid Gland  Procedure: Total thyroidectomy.  Tumor focality: Multifocal.  Tumor site: Right lobe.  Tumor size: 2 x 2 x 1.5 cm.  Histologic type: Papillary carcinoma, follicular variant, encapsulated/well demarcated, with tumor capsular  invasion.  Margins: Uninvolved by carcinoma.  Angioinvasion: Not identified.  Lymphatic invasion: Not identified.  Extrathyroidal extension: Not identified.  Regional lymph nodes:  -Number of lymph nodes involved: 0.  -Number of lymph nodes examined: 2 (incidental perithyroidal).  Pathologic Stage Classification: [id number].    She is currently on levothyroxine 125 µg and takes a multivitamin and calcium 2000 mg daily-TUMS  Postoperatively her calcium was low but most recent calcium September 10 was normal    Patient is a smoker and has COPD and has " chronic dyspnea on exertion          I have reviewed the past medical, family and social history    Review of Systems   Constitutional: Positive for fatigue. Negative for appetite change, fever and unexpected weight change.   HENT: Negative for sore throat and trouble swallowing.    Eyes: Negative for visual disturbance.   Respiratory: Positive for shortness of breath. Negative for wheezing.    Cardiovascular: Negative for chest pain, palpitations and leg swelling.   Gastrointestinal: Negative for diarrhea, nausea and vomiting.   Endocrine: Negative for cold intolerance, heat intolerance, polydipsia, polyphagia and polyuria.   Genitourinary: Negative for difficulty urinating, dysuria and menstrual problem.   Musculoskeletal: Negative for arthralgias and joint swelling.   Skin: Negative for rash.   Neurological: Negative for dizziness, weakness, numbness and headaches.   Psychiatric/Behavioral: Negative for confusion, dysphoric mood and sleep disturbance.       Objective:      Physical Exam   Constitutional: She is oriented to person, place, and time. She appears well-developed and well-nourished. No distress.   HENT:   Head: Normocephalic and atraumatic.   Right Ear: External ear normal.   Left Ear: External ear normal.   Nose: Nose normal.   Mouth/Throat: Oropharynx is clear and moist. No oropharyngeal exudate.   Eyes: Conjunctivae and EOM are normal. Pupils are equal, round, and reactive to light. No scleral icterus.   Neck: No JVD present. No tracheal deviation present. No thyromegaly present.   Anterior neck scar healing well   Cardiovascular: Normal rate, regular rhythm, normal heart sounds and intact distal pulses.  Exam reveals no gallop and no friction rub.    No murmur heard.  Pulmonary/Chest: Effort normal and breath sounds normal. No respiratory distress. She has no wheezes. She has no rales.   Abdominal: Soft. Bowel sounds are normal. She exhibits no distension and no mass. There is no tenderness. There  is no rebound and no guarding. No hernia.   Musculoskeletal: She exhibits no edema or deformity.   Lymphadenopathy:     She has no cervical adenopathy.   Neurological: She is alert and oriented to person, place, and time. She has normal reflexes. No cranial nerve deficit.   Skin: Skin is warm. No rash noted. No erythema.   Psychiatric: She has a normal mood and affect. Her behavior is normal.   Vitals reviewed.        Lab Review:   Lab Visit on 09/14/2017   Component Date Value    Specimen UA 09/14/2017 Urine, Clean Catch     Color, UA 09/14/2017 Yellow     Appearance, UA 09/14/2017 Clear     pH, UA 09/14/2017 6.0     Specific Gravity, UA 09/14/2017 1.010     Protein, UA 09/14/2017 Negative     Glucose, UA 09/14/2017 Negative     Ketones, UA 09/14/2017 Negative     Bilirubin (UA) 09/14/2017 Negative     Occult Blood UA 09/14/2017 1+*    Nitrite, UA 09/14/2017 Negative     Urobilinogen, UA 09/14/2017 Negative     Leukocytes, UA 09/14/2017 Negative     Urine Culture, Routine 09/16/2017 No significant growth     RBC, UA 09/14/2017 2     WBC, UA 09/14/2017 1     Bacteria, UA 09/14/2017 Rare     Yeast, UA 09/14/2017 Occasional*    Squam Epithel, UA 09/14/2017 1     Microscopic Comment 09/14/2017 SEE COMMENT    Lab Visit on 09/14/2017   Component Date Value    WBC 09/14/2017 5.44     RBC 09/14/2017 3.75*    Hemoglobin 09/14/2017 13.2     Hematocrit 09/14/2017 38.2     MCV 09/14/2017 102*    MCH 09/14/2017 35.2*    MCHC 09/14/2017 34.6     RDW 09/14/2017 15.7*    Platelets 09/14/2017 203     MPV 09/14/2017 10.0     Gran # 09/14/2017 2.6     Lymph # 09/14/2017 2.5     Mono # 09/14/2017 0.3     Eos # 09/14/2017 0.1     Baso # 09/14/2017 0.04     Gran% 09/14/2017 47.6     Lymph% 09/14/2017 46.0     Mono% 09/14/2017 4.6     Eosinophil% 09/14/2017 1.1     Basophil% 09/14/2017 0.7     Differential Method 09/14/2017 Automated     Sodium 09/14/2017 139     Potassium 09/14/2017 4.2      Chloride 09/14/2017 105     CO2 09/14/2017 23     Glucose 09/14/2017 113*    BUN, Bld 09/14/2017 8     Creatinine 09/14/2017 1.3     Calcium 09/14/2017 9.4     Total Protein 09/14/2017 7.2     Albumin 09/14/2017 3.6     Total Bilirubin 09/14/2017 0.3     Alkaline Phosphatase 09/14/2017 64     AST 09/14/2017 19     ALT 09/14/2017 36     Anion Gap 09/14/2017 11     eGFR if  09/14/2017 56*    eGFR if non African Amer* 09/14/2017 49*   Admission on 09/10/2017, Discharged on 09/10/2017   Component Date Value    WBC 09/10/2017 5.75     RBC 09/10/2017 3.73*    Hemoglobin 09/10/2017 13.2     Hematocrit 09/10/2017 38.0     MCV 09/10/2017 102*    MCH 09/10/2017 35.4*    MCHC 09/10/2017 34.7     RDW 09/10/2017 15.8*    Platelets 09/10/2017 291     MPV 09/10/2017 11.3     Gran # 09/10/2017 2.2     Lymph # 09/10/2017 3.2     Mono # 09/10/2017 0.3     Eos # 09/10/2017 0.1     Baso # 09/10/2017 0.03     Gran% 09/10/2017 38.1     Lymph% 09/10/2017 55.0*    Mono% 09/10/2017 4.7     Eosinophil% 09/10/2017 1.7     Basophil% 09/10/2017 0.5     Differential Method 09/10/2017 Automated     Specimen UA 09/10/2017 Urine, Clean Catch     Color, UA 09/10/2017 Yellow     Appearance, UA 09/10/2017 Clear     pH, UA 09/10/2017 6.0     Specific Gravity, UA 09/10/2017 <=1.005*    Protein, UA 09/10/2017 Negative     Glucose, UA 09/10/2017 Negative     Ketones, UA 09/10/2017 Negative     Bilirubin (UA) 09/10/2017 Negative     Occult Blood UA 09/10/2017 1+*    Nitrite, UA 09/10/2017 Negative     Urobilinogen, UA 09/10/2017 Negative     Leukocytes, UA 09/10/2017 Negative     RBC, UA 09/10/2017 2     WBC, UA 09/10/2017 0     Bacteria, UA 09/10/2017 None     Squam Epithel,  09/10/2017 0     Microscopic Comment 09/10/2017 SEE COMMENT     Sodium 09/10/2017 138     Potassium 09/10/2017 4.0     Chloride 09/10/2017 101     CO2 09/10/2017 24     Glucose 09/10/2017 93     BUN, Bld  09/10/2017 8     Creatinine 09/10/2017 1.2     Calcium 09/10/2017 9.6     Anion Gap 09/10/2017 13     eGFR if African American 09/10/2017 >60     eGFR if non African Amer* 09/10/2017 54*   Admission on 08/31/2017, Discharged on 09/01/2017   Component Date Value    Calcium 08/31/2017 8.4*    Magnesium 08/31/2017 2.0     Calcium 08/31/2017 8.8     Calcium 09/01/2017 9.8     Calcium 09/01/2017 9.9     Calcium 09/01/2017 10.0    Lab Visit on 08/10/2017   Component Date Value    WBC 08/10/2017 6.02     RBC 08/10/2017 3.79*    Hemoglobin 08/10/2017 13.2     Hematocrit 08/10/2017 38.2     MCV 08/10/2017 101*    MCH 08/10/2017 34.8*    MCHC 08/10/2017 34.6     RDW 08/10/2017 14.6*    Platelets 08/10/2017 261     MPV 08/10/2017 10.9     Gran # 08/10/2017 2.9     Lymph # 08/10/2017 2.7     Mono # 08/10/2017 0.3     Eos # 08/10/2017 0.1     Baso # 08/10/2017 0.04     Gran% 08/10/2017 48.0     Lymph% 08/10/2017 44.2     Mono% 08/10/2017 5.3     Eosinophil% 08/10/2017 1.8     Basophil% 08/10/2017 0.7     Differential Method 08/10/2017 Automated     Sodium 08/10/2017 137     Potassium 08/10/2017 3.6     Chloride 08/10/2017 102     CO2 08/10/2017 26     Glucose 08/10/2017 123*    BUN, Bld 08/10/2017 13     Creatinine 08/10/2017 1.2     Calcium 08/10/2017 9.0     Total Protein 08/10/2017 7.4     Albumin 08/10/2017 3.7     Total Bilirubin 08/10/2017 0.4     Alkaline Phosphatase 08/10/2017 65     AST 08/10/2017 18     ALT 08/10/2017 19     Anion Gap 08/10/2017 9     eGFR if African American 08/10/2017 >60     eGFR if non African Amer* 08/10/2017 54*   Lab Visit on 07/24/2017   Component Date Value    TSH 07/25/2017 0.855     Free T4 07/25/2017 0.74     PTH, Intact 07/25/2017 41.0        Assessment:     1. Hx of thyroid cancer  Anti-thyroglobulin antibody    Thyroglobulin   2. Postoperative hypothyroidism  TSH    T4, free    T3, free   3. Hypocalcemia  Basic metabolic panel         Discussed management, prognosis, in pathogenesis of her thyroid cancer and the need for surveillance over time    Both patient and her  would rather not have any additional treatment such as radioactive iodine considering her other diagnosis of metastatic breast cancer and that she has been through a lot and has had multiple tests and procedures / surgery    And given the fact that her lymph node biopsy was negative in her neck during time of thyroidectomy, it is reasonable to monitor her thyroglobulin levels and imaging studies of her neck over time for signs of recurrence and not treat with radioactive iodine at this time    Both patient and her spouse became tearful when speaking about her current condition regarding breast cancer which is understandable    For now she will continue on levothyroxine and calcium  Plan:   Hx of thyroid cancer  -     Anti-thyroglobulin antibody; Future; Expected date: 09/20/2017  -     Thyroglobulin; Future; Expected date: 09/20/2017    Postoperative hypothyroidism  -     TSH; Future; Expected date: 09/20/2017  -     T4, free; Future; Expected date: 09/20/2017  -     T3, free; Future; Expected date: 09/20/2017    Hypocalcemia  -     Basic metabolic panel; Future; Expected date: 09/20/2017          Return in about 6 weeks (around 11/1/2017). with labs prior

## 2017-09-20 NOTE — LETTER
September 20, 2017      Zaid Baugh MD  [address] [address]           [facility] - Endocrinology  [address]  [address]  [place] [address]  Phone: [phone]  Fax: [phone]          Patient: Josey Flores   MR Number: [id number]   YOB: 1970   Date of Visit: 9/20/2017       Dear Dr. Zaid Baugh:    Thank you for referring Josey Flores to me for evaluation. Attached you will find relevant portions of my assessment and plan of care.    If you have questions, please do not hesitate to call me. I look forward to following Josey Flores along with you.    Sincerely,    Nessa Jason MD    Enclosure  CC:  No Recipients    If you would like to receive this communication electronically, please contact [email or web address] or [phone] to request more information on [facility] Link access.    For providers and/or their staff who would like to refer a patient to [facility], please contact us through our one-stop-shop provider referral line, [facility] , at [phone].    If you feel you have received this communication in error or would no longer like to receive these types of communications, please e-mail [email or web address]

## 2017-09-22 ENCOUNTER — OFFICE VISIT (OUTPATIENT)
Dept: HEMATOLOGY/ONCOLOGY | Facility: CLINIC | Age: 47
End: 2017-09-22
Payer: MEDICAID

## 2017-09-22 VITALS
OXYGEN SATURATION: 98 % | HEIGHT: 67 IN | HEART RATE: 75 BPM | WEIGHT: 215.38 LBS | BODY MASS INDEX: 33.8 KG/M2 | SYSTOLIC BLOOD PRESSURE: 116 MMHG | DIASTOLIC BLOOD PRESSURE: 80 MMHG | TEMPERATURE: 97 F | RESPIRATION RATE: 18 BRPM

## 2017-09-22 DIAGNOSIS — G89.3 NEOPLASM RELATED PAIN: ICD-10-CM

## 2017-09-22 DIAGNOSIS — Z17.0 MALIGNANT NEOPLASM OF OVERLAPPING SITES OF RIGHT BREAST IN FEMALE, ESTROGEN RECEPTOR POSITIVE: Primary | ICD-10-CM

## 2017-09-22 DIAGNOSIS — C73 PAPILLARY THYROID CARCINOMA: ICD-10-CM

## 2017-09-22 DIAGNOSIS — C77.3 MALIGNANT NEOPLASM METASTATIC TO LYMPH NODE OF AXILLA: ICD-10-CM

## 2017-09-22 DIAGNOSIS — C79.51 SECONDARY CANCER OF BONE: ICD-10-CM

## 2017-09-22 DIAGNOSIS — C50.811 MALIGNANT NEOPLASM OF OVERLAPPING SITES OF RIGHT BREAST IN FEMALE, ESTROGEN RECEPTOR POSITIVE: Primary | ICD-10-CM

## 2017-09-22 PROCEDURE — 99999 PR PBB SHADOW E&M-EST. PATIENT-LVL IV: CPT | Mod: PBBFAC,,, | Performed by: INTERNAL MEDICINE

## 2017-09-22 PROCEDURE — 3008F BODY MASS INDEX DOCD: CPT | Mod: ,,, | Performed by: INTERNAL MEDICINE

## 2017-09-22 PROCEDURE — 99214 OFFICE O/P EST MOD 30 MIN: CPT | Mod: S$PBB,,, | Performed by: INTERNAL MEDICINE

## 2017-09-22 PROCEDURE — 99214 OFFICE O/P EST MOD 30 MIN: CPT | Mod: PBBFAC,PO | Performed by: INTERNAL MEDICINE

## 2017-09-22 NOTE — PROGRESS NOTES
Reason for visit: Right breast carcinoma  Date of Diagnosis: January 12, 2017    HPI:   The patient is a 46-year-old  female who presents to the hematology oncology clinic today for follow up for metastatic right breast carcinoma.    I have reviewed all of the patient's relevant clinical history available in the medical record and have utilized this in my evaluation and management recommendations today.  The patient had 2 separate areas in the right breast that were biopsied area the first was a right breast mass at 11:00 which was 3 cm from the nipple which showed invasive mammary carcinoma.  The invasive carcinoma measured at least 1.2 cm in greatest dimension and appeared high-grade.  This tumor was ER positive/[place] positive/HER-2 negative.  The second breast mass was biopsied from the retroperitoneal area lower area and was also positive for invasive mammary carcinoma.  This measured at least 0.4 cm and also appeared high-grade.  The tumor is morphologically very similar to the tumor in the prior specimen.  Receptor studies were not done on this specimen.  The patient also had a palpable right axillary lymph node which was biopsied and showed metastatic mammary carcinoma which measured at least 1.3 cm in greatest dimension.  The patient had self palpated this breast mass.  In addition the patient has a palpable mass in the region of her right elbow which she reports has been present for 9 years but had been slowly growing especially over the past year or 2.  She denies any pain associated with this.    Most recently she is status post total thyroidectomy for papillary thyroid carcinoma.  Today the patient reports that she continues to have lower back pain.  However her urinary frequency, loose stools and swelling in her legs has resolved.  She reports that her energy levels are also slightly better. She is taking norco PRN neoplasm related pain.  She denies any fevers or chills. Denies night sweats.  She  denies any melena, hematochezia, hematemesis, hemoptysis or hematuria. She denies nausea. Denies vomiting or abdominal pain. She reports chronic cough.  She continues on ibrance and letrozole. Ibrance was started on 2/21/17.    PAST MEDICAL HISTORY:   1. HSIL on pap smear s/p LEEP    SURGICAL HISTORY:   1.  Tonsillectomy and adenoidectomy  2.  Appendectomy  3.  Cholecystectomy  4.  D&C  5.  Thyroidectomy on August 31, 2017    FAMILY HISTORY: She reports that her maternal aunt had lung cancer in her 70s.  She used to smoke cigarettes.  Her maternal cousin had lung cancer in her 60s.  She used to smoke cigarettes.  She denies any other immediate family members with cancer or bleeding/clotting disorders.    SOCIAL HISTORY: She reports a 45+ pack year smoking history. She quit in May 2017. She drinks wine occasionally.  She denies any history of recreational drug use.  She is engaged and has 2 sons.  She used to work for [employer].  She lives in [place], [place].     ALLERGIES: [NKDA]    MEDICATIONS: [Medcard has been reviewed and/or reconciled.]    REVIEW OF SYSTEMS:   GENERAL: [No fevers, chills or sweats. Reports fatigue. Reports weight gain. Denies loss of appetite.]  HEENT: [No blurred vision, tinnitus, nasal discharge, sorethroat or dysphagia.]  HEART: [No chest pain, palpitations or shortness of breath.]    LUNGS: [Reports chronic cough. Denies hemoptysis or breathing problems.]  ABDOMEN: [No abdominal pain, nausea, vomiting, diarrhea, constipation or melena.]  GENITOURINARY: [No dysuria, bleeding or malodorous discharge.]  NEURO: [No headache, dizziness or vertigo.]  HEMATOLOGY: [No easy bruising, spontaneous bleeding or blood clots in the past].  MUSCULOSKELETAL: [Reports arthralgias, myalgias and bone pains.]  SKIN: [No rashes or skin lesions.]  PSYCHIATRY: [No depression. Reports anxiety.]      PHYSICAL EXAMINATION:   VS: Reviewed on nurse's notes.  APPEARANCE: The patient is a well-developed,  well-nourished and well-groomed  female who appears in no acute distress. She was accompanied to this clinic visit by her fiance.  HEENT: No scleral icterus. Both external auditory canals clear. No oral ulcers, lesions. Throat clear  HEAD: No sinus tenderness.  NECK: Supple. No palpable lymphadenopathy. Thyroid non-tender, no palpable masses  CHEST: Breath sounds clear bilaterally. No rales. No rhonchi. Unlabored respirations.  BREAST: Deferred today.  CARDIOVASCULAR: Normal S1, S2. Normal rate. Regular rhythm.  ABDOMEN: Bowel sounds normal. No tenderness. No abdominal distention. No hepatomegaly. No splenomegaly.  BACK: Palpable tenderness in the lower back.  LYMPHATIC: No palpable supraclavicular lymphadenopathy. Bilateral axillary healed wounds noted.  EXTREMITIES: No clubbing, cyanosis. Palpable firm 3 cm mass in the right elbow is noted. Trace edema in bilateral lower extremities.      SKIN: No lesions. No petechiae. No ecchymoses.   NEUROLOGIC: No focal findings. Alert & Oriented x 3. Mood appropriate to affect    LABS:   Reviewed    IMAGING:  Reviewed    IMPRESSION:  1.  Metastatic multifocal infiltrating ductal carcinoma of the right breast with right axillary and bone involvement [ER positive/IN positive/HER-2 negative]  2.  Papillary thyroid carcinoma  3.  Tobacco abuse  4.  Right elbow mass  5.  Anxiety and depression  6.  Chronic cough  7.  Right axillary hydradenitis/cellulitis and left inguinal hydradenitis/cellulitis s/p debridement and skin grafting    PLAN:  1.  I had a detailed discussion with the patient previously with regard to the diagnosis, prognosis and treatment options for metastatic multifocal invasive ductal carcinoma of the right breast with right axillary and bone involvement.  2.  I have discussed that at this time her metastatic malignancy is potentially treatable but not curable.  3.  Results of PET/CT scan from 7/12/17 were discussed in detail with the patient previously.  Findings consistent with treatment response with no FDG avid disease identified with essentially complete resolution of the previously noted right axillary adenopathy and right breast mass.  No FDG avidity associated with osseous lesions. She had persistent significantly FDG avid right thyroid lesion.   4.  The patient was strongly encouraged to quit smoking.  She expressed understanding.  Prescription for Xanax when necessary anxiety was previously given to the patient after full discussion of sedation precautions.    5. FSH and estradiol levels confirm menopausal status. TSH lab was normal.   6.  MRI of the brain on 1/31/17 to evaluate for any evidence of metastatic disease to complete staging workup was negative for metastatic malignancy to the brain. CT head done in March 2017 in ED was also ok.  7. Continue to proceed with ibrance/letrozole.  She appears to be tolerating this well so far with no significant side effects.   8. Her medical insurance denied genetic testing at [facility] with regard to her breast cancer. I will look at alternative lab options where this might be covered.  9. She has been advised to take calcium and vit d supplementation everyday. She is on xgeva injections for treatment of bone involvement by malignancy. Risks/benefits and common side effects discussed and she expressed understanding and agreement. She has dentures.  10. Continue Lasix for treatment of bilateral lower extremity edema.  Supplemental potassium was given as well.  Advised to use compression stockings and elevate legs.  11.  She will continue follow up with with Dr. Jason with endocrinology for follow up of papillary thyroid carcinoma.    12.  Results of MRI of the thoracic and lumbar spine from 9/21/17 for evaluation of current acute symptoms related to back pain reviewed in detail with pateint. No acute process involving the spinal cord is noted. She does have bone involvement by malignancy in multiple areas.  However the PET/CT scheduled for 9/27/17 will determine whether these are new areas of involvement representing disease progression or whether they were picked up on the MRI because this is a more sensitive test.    Return to clinic after above to discuss further management.  She knows to call sooner for any new problems or questions.    Richard Delong MD

## 2017-09-26 ENCOUNTER — TELEPHONE (OUTPATIENT)
Dept: RADIOLOGY | Facility: HOSPITAL | Age: 47
End: 2017-09-26

## 2017-09-27 ENCOUNTER — HOSPITAL ENCOUNTER (OUTPATIENT)
Dept: RADIOLOGY | Facility: HOSPITAL | Age: 47
Discharge: HOME | End: 2017-09-27
Attending: INTERNAL MEDICINE
Payer: MEDICAID

## 2017-09-27 DIAGNOSIS — C79.51 SECONDARY CANCER OF BONE: ICD-10-CM

## 2017-09-27 DIAGNOSIS — M54.50 ACUTE BILATERAL LOW BACK PAIN WITHOUT SCIATICA: ICD-10-CM

## 2017-09-27 DIAGNOSIS — C73 PAPILLARY THYROID CARCINOMA: ICD-10-CM

## 2017-09-27 DIAGNOSIS — C77.3 MALIGNANT NEOPLASM METASTATIC TO LYMPH NODE OF AXILLA: ICD-10-CM

## 2017-09-27 DIAGNOSIS — R39.15 URINARY URGENCY: ICD-10-CM

## 2017-09-27 DIAGNOSIS — C50.811 MALIGNANT NEOPLASM OF OVERLAPPING SITES OF RIGHT BREAST IN FEMALE, ESTROGEN RECEPTOR POSITIVE: ICD-10-CM

## 2017-09-27 DIAGNOSIS — Z17.0 MALIGNANT NEOPLASM OF OVERLAPPING SITES OF RIGHT BREAST IN FEMALE, ESTROGEN RECEPTOR POSITIVE: ICD-10-CM

## 2017-09-27 PROCEDURE — A9552 F18 FDG: HCPCS

## 2017-09-27 PROCEDURE — 78815 PET IMAGE W/CT SKULL-THIGH: CPT | Mod: 26,PS,, | Performed by: RADIOLOGY

## 2017-09-28 ENCOUNTER — TELEPHONE (OUTPATIENT)
Dept: HEMATOLOGY/ONCOLOGY | Facility: CLINIC | Age: 47
End: 2017-09-28

## 2017-09-28 DIAGNOSIS — Z17.0 MALIGNANT NEOPLASM OF OVERLAPPING SITES OF RIGHT BREAST IN FEMALE, ESTROGEN RECEPTOR POSITIVE: Primary | ICD-10-CM

## 2017-09-28 DIAGNOSIS — C50.811 MALIGNANT NEOPLASM OF OVERLAPPING SITES OF RIGHT BREAST IN FEMALE, ESTROGEN RECEPTOR POSITIVE: Primary | ICD-10-CM

## 2017-09-28 LAB
PRE FEV1 FVC: 84.99 % (ref 70.81–90.4)
PRE FEV1: 2.42 L (ref 2.5–3.78)
PRE FVC: 2.84 L (ref 3.19–4.71)
PRE PEF: 3.31 L/S (ref 5.4–9.15)

## 2017-09-28 NOTE — TELEPHONE ENCOUNTER
SW called and left message on home phone number regarding scheduling appt with Dr. Guerin. DAYANNA asked pt to call back and provided her direct contact info.

## 2017-10-05 ENCOUNTER — TELEPHONE (OUTPATIENT)
Dept: HEMATOLOGY/ONCOLOGY | Facility: CLINIC | Age: 47
End: 2017-10-05

## 2017-10-05 DIAGNOSIS — Z17.0 MALIGNANT NEOPLASM OF OVERLAPPING SITES OF RIGHT BREAST IN FEMALE, ESTROGEN RECEPTOR POSITIVE: Primary | ICD-10-CM

## 2017-10-05 DIAGNOSIS — C50.811 MALIGNANT NEOPLASM OF OVERLAPPING SITES OF RIGHT BREAST IN FEMALE, ESTROGEN RECEPTOR POSITIVE: Primary | ICD-10-CM

## 2017-10-05 DIAGNOSIS — C50.811 MALIGNANT NEOPLASM OF OVERLAPPING SITES OF RIGHT BREAST IN FEMALE, ESTROGEN RECEPTOR POSITIVE: ICD-10-CM

## 2017-10-05 DIAGNOSIS — G89.3 NEOPLASM RELATED PAIN: ICD-10-CM

## 2017-10-05 DIAGNOSIS — F32.A DEPRESSION, UNSPECIFIED DEPRESSION TYPE: ICD-10-CM

## 2017-10-05 DIAGNOSIS — F41.9 ANXIETY: ICD-10-CM

## 2017-10-05 DIAGNOSIS — C77.3 MALIGNANT NEOPLASM METASTATIC TO LYMPH NODE OF AXILLA: ICD-10-CM

## 2017-10-05 DIAGNOSIS — C79.51 SECONDARY CANCER OF BONE: ICD-10-CM

## 2017-10-05 DIAGNOSIS — Z17.0 MALIGNANT NEOPLASM OF OVERLAPPING SITES OF RIGHT BREAST IN FEMALE, ESTROGEN RECEPTOR POSITIVE: ICD-10-CM

## 2017-10-05 RX ORDER — HYDROCODONE BITARTRATE AND ACETAMINOPHEN 7.5; 325 MG/1; MG/1
1 TABLET ORAL EVERY 6 HOURS PRN
Qty: 60 TABLET | Refills: 0 | Status: SHIPPED | OUTPATIENT
Start: 2017-10-05 | End: 2017-10-19 | Stop reason: SDUPTHER

## 2017-10-05 RX ORDER — ALPRAZOLAM 0.5 MG/1
0.5 TABLET ORAL 2 TIMES DAILY PRN
Qty: 60 TABLET | Refills: 0 | Status: SHIPPED | OUTPATIENT
Start: 2017-10-05 | End: 2017-11-07 | Stop reason: SDUPTHER

## 2017-10-05 NOTE — TELEPHONE ENCOUNTER
----- Message from Nicho Gale MA sent at 10/5/2017 12:48 PM CDT -----  Contact: pt  Can u call her I scheduled everything  Thanks   nicho  ----- Message -----  From: Richard Delong MD  Sent: 10/5/2017  12:22 PM  To: Nicho Gale MA    Refill sent.  Please schedule follow-up for the patient to see me one day next week with RONNIE, CMP on the day of clinic visit.  She also needs to be on the chemotherapy schedule for Xgeva on that day.  Thank you.  ----- Message -----  From: Nicho Gale MA  Sent: 10/5/2017  11:56 AM  To: Richard Delong MD        ----- Message -----  From: Lupis Mauro  Sent: 10/5/2017  11:06 AM  To: [facility] Ramos Staff    Pt is calling nurse staff regarding refills  1. What is the name of the medication you are requesting? Narco 7.5 and Xanax 5.mg   2. What is the dose? Dose is with RX  3. How do you take the medication? Orally, topically, etc? orally  4. How often do you take this medication?  Narco every 6 hr. And Xanax once a day  5. Do you need a 30 day or 90 day supply? 30 day  6. How many refills are you requesting? none  7. What is your preferred pharmacy and location of the pharmacy?     [facility] [address] [place] [id number]  Phone: [phone] Fax: [phone]    [facility] [place] [facility] [address] [place] [facility]  [address] [place] [address]  Phone: [phone] Fax: [phone]      8. Who can we contact with further questions? Pt call back [phone] thanks

## 2017-10-11 ENCOUNTER — INFUSION (OUTPATIENT)
Dept: INFUSION THERAPY | Facility: HOSPITAL | Age: 47
End: 2017-10-11
Attending: INTERNAL MEDICINE
Payer: MEDICAID

## 2017-10-11 ENCOUNTER — OFFICE VISIT (OUTPATIENT)
Dept: HEMATOLOGY/ONCOLOGY | Facility: CLINIC | Age: 47
End: 2017-10-11
Payer: MEDICAID

## 2017-10-11 VITALS
RESPIRATION RATE: 18 BRPM | HEART RATE: 96 BPM | SYSTOLIC BLOOD PRESSURE: 108 MMHG | BODY MASS INDEX: 33.6 KG/M2 | TEMPERATURE: 98 F | DIASTOLIC BLOOD PRESSURE: 71 MMHG | WEIGHT: 214.06 LBS | HEIGHT: 67 IN | OXYGEN SATURATION: 98 %

## 2017-10-11 VITALS — BODY MASS INDEX: 33.6 KG/M2 | WEIGHT: 214.06 LBS | HEIGHT: 67 IN

## 2017-10-11 DIAGNOSIS — C79.51 SECONDARY CANCER OF BONE: Primary | ICD-10-CM

## 2017-10-11 DIAGNOSIS — C79.51 SECONDARY CANCER OF BONE: ICD-10-CM

## 2017-10-11 DIAGNOSIS — C73 PAPILLARY THYROID CARCINOMA: ICD-10-CM

## 2017-10-11 DIAGNOSIS — G89.3 NEOPLASM RELATED PAIN: ICD-10-CM

## 2017-10-11 DIAGNOSIS — C50.811 MALIGNANT NEOPLASM OF OVERLAPPING SITES OF RIGHT BREAST IN FEMALE, ESTROGEN RECEPTOR POSITIVE: ICD-10-CM

## 2017-10-11 DIAGNOSIS — C77.3 MALIGNANT NEOPLASM METASTATIC TO LYMPH NODE OF AXILLA: Primary | ICD-10-CM

## 2017-10-11 DIAGNOSIS — Z17.0 MALIGNANT NEOPLASM OF OVERLAPPING SITES OF RIGHT BREAST IN FEMALE, ESTROGEN RECEPTOR POSITIVE: ICD-10-CM

## 2017-10-11 PROCEDURE — 63600175 PHARM REV CODE 636 W HCPCS: Mod: PO | Performed by: INTERNAL MEDICINE

## 2017-10-11 PROCEDURE — 99215 OFFICE O/P EST HI 40 MIN: CPT | Mod: 25,S$PBB,, | Performed by: INTERNAL MEDICINE

## 2017-10-11 PROCEDURE — 99999 PR PBB SHADOW E&M-EST. PATIENT-LVL IV: CPT | Mod: PBBFAC,,, | Performed by: INTERNAL MEDICINE

## 2017-10-11 PROCEDURE — 99214 OFFICE O/P EST MOD 30 MIN: CPT | Mod: PBBFAC,25,PO | Performed by: INTERNAL MEDICINE

## 2017-10-11 PROCEDURE — 96372 THER/PROPH/DIAG INJ SC/IM: CPT | Mod: PO

## 2017-10-11 PROCEDURE — 90686 IIV4 VACC NO PRSV 0.5 ML IM: CPT | Mod: ,,, | Performed by: INTERNAL MEDICINE

## 2017-10-11 PROCEDURE — 90471 IMMUNIZATION ADMIN: CPT | Mod: ,,, | Performed by: INTERNAL MEDICINE

## 2017-10-11 RX ADMIN — DENOSUMAB 120 MG: 120 INJECTION SUBCUTANEOUS at 03:10

## 2017-10-11 NOTE — PATIENT INSTRUCTIONS
[facility]  [address]  [phone] phone     [phone] fax  Hours of Operation: Monday- Friday 8:00am- 5:00pm  After hours phone  [phone]  Hematology / Oncology Physicians on call      Dr. Jake Keith                        Please call with any concerns regarding your appointment today.

## 2017-10-11 NOTE — PROGRESS NOTES
Reason for visit: Right breast carcinoma  Date of Diagnosis: January 12, 2017    HPI:   The patient is a 46-year-old  female who presents to the hematology oncology clinic today for follow up for metastatic right breast carcinoma.    I have reviewed all of the patient's relevant clinical history available in the medical record and have utilized this in my evaluation and management recommendations today.  The patient had 2 separate areas in the right breast that were biopsied area the first was a right breast mass at 11:00 which was 3 cm from the nipple which showed invasive mammary carcinoma.  The invasive carcinoma measured at least 1.2 cm in greatest dimension and appeared high-grade.  This tumor was ER positive/NV positive/HER-2 negative.  The second breast mass was biopsied from the retroperitoneal area lower area and was also positive for invasive mammary carcinoma.  This measured at least 0.4 cm and also appeared high-grade.  The tumor is morphologically very similar to the tumor in the prior specimen.  Receptor studies were not done on this specimen.  The patient also had a palpable right axillary lymph node which was biopsied and showed metastatic mammary carcinoma which measured at least 1.3 cm in greatest dimension.  The patient had self palpated this breast mass.  In addition the patient has a palpable mass in the region of her right elbow which she reports has been present for 9 years but had been slowly growing especially over the past year or 2.  She denies any pain associated with this.    Most recently she is status post total thyroidectomy for papillary thyroid carcinoma.  Today the patient reports that she continues to have lower back pain.  She has been having worsening problems with anxiety and depression.  However she denies any suicidal or homicidal ideation.  She reports chronic fatigue. She is taking norco PRN neoplasm related pain.  She denies any fevers or chills. Denies night  sweats.  She denies any melena, hematochezia, hematemesis, hemoptysis or hematuria. She denies nausea. Denies vomiting or abdominal pain.  She denies cough.  She continues on ibrance and letrozole. Ibrance was started on 2/21/17.    PAST MEDICAL HISTORY:   1. HSIL on pap smear s/p LEEP    SURGICAL HISTORY:   1.  Tonsillectomy and adenoidectomy  2.  Appendectomy  3.  Cholecystectomy  4.  D&C  5.  Thyroidectomy on August 31, 2017    FAMILY HISTORY: She reports that her maternal aunt had lung cancer in her 70s.  She used to smoke cigarettes.  Her maternal cousin had lung cancer in her 60s.  She used to smoke cigarettes.  She denies any other immediate family members with cancer or bleeding/clotting disorders.    SOCIAL HISTORY: She reports a 45+ pack year smoking history. She quit in May 2017. She drinks wine occasionally.  She denies any history of recreational drug use.  She is engaged and has 2 sons.  She used to work for [employer].  She lives in [place], [place].     ALLERGIES: [NKDA]    MEDICATIONS: [Medcard has been reviewed and/or reconciled.]    REVIEW OF SYSTEMS:   GENERAL: [No fevers, chills or sweats. Reports fatigue. Reports weight gain. Denies loss of appetite.]  HEENT: [No blurred vision, tinnitus, nasal discharge, sorethroat or dysphagia.]  HEART: [No chest pain, palpitations or shortness of breath.]    LUNGS: [Reports chronic cough. Denies hemoptysis or breathing problems.]  ABDOMEN: [No abdominal pain, nausea, vomiting, diarrhea, constipation or melena.]  GENITOURINARY: [No dysuria, bleeding or malodorous discharge.]  NEURO: [No headache, dizziness or vertigo.]  HEMATOLOGY: [No easy bruising, spontaneous bleeding or blood clots in the past].  MUSCULOSKELETAL: [Reports arthralgias, myalgias and bone pains.]  SKIN: [No rashes or skin lesions.]  PSYCHIATRY: [Reports depression. Reports anxiety.]  Denies suicidal/homicidal ideation.    PHYSICAL EXAMINATION:   VS: Reviewed on nurse's  notes.  APPEARANCE: The patient is a well-developed, well-nourished and well-groomed  female who appears in no acute distress. She was accompanied to this clinic visit by her fiance.  HEENT: No scleral icterus. Both external auditory canals clear. No oral ulcers, lesions. Throat clear  HEAD: No sinus tenderness.  NECK: Supple. No palpable lymphadenopathy. Thyroid non-tender, no palpable masses  CHEST: Breath sounds clear bilaterally. No rales. No rhonchi. Unlabored respirations.  BREAST: Deferred today.  CARDIOVASCULAR: Normal S1, S2. Normal rate. Regular rhythm.  ABDOMEN: Bowel sounds normal. No tenderness. No abdominal distention. No hepatomegaly. No splenomegaly.  BACK: Palpable tenderness in the lower back.  LYMPHATIC: No palpable supraclavicular lymphadenopathy. Bilateral axillary healed wounds noted.  EXTREMITIES: No clubbing, cyanosis. Palpable firm 3 cm mass in the right elbow is noted. Trace edema in bilateral lower extremities.      SKIN: No lesions. No petechiae. No ecchymoses.   NEUROLOGIC: No focal findings. Alert & Oriented x 3. Mood appropriate to affect    LABS:   Reviewed    IMAGING:  Reviewed    IMPRESSION:  1.  Metastatic multifocal infiltrating ductal carcinoma of the right breast with right axillary and bone involvement [ER positive/MD positive/HER-2 negative]  2.  Papillary thyroid carcinoma  3.  Tobacco abuse  4.  Right elbow mass  5.  Anxiety and depression  6.  Right axillary hydradenitis/cellulitis and left inguinal hydradenitis/cellulitis s/p debridement and skin grafting    PLAN:  1.  I had a detailed discussion with the patient previously with regard to the diagnosis, prognosis and treatment options for metastatic multifocal invasive ductal carcinoma of the right breast with right axillary and bone involvement.  2.  I have discussed that at this time her metastatic malignancy is potentially treatable but not curable.  3.  Results of PET/CT scan from 7/12/17 were discussed in  detail with the patient previously. Findings consistent with treatment response with no FDG avid disease identified with essentially complete resolution of the previously noted right axillary adenopathy and right breast mass.  No FDG avidity associated with osseous lesions. She had persistent significantly FDG avid right thyroid lesion.   4.  The patient was strongly encouraged to quit smoking.  She expressed understanding.  Prescription for Xanax when necessary anxiety was previously given to the patient after full discussion of sedation precautions.    5. FSH and estradiol levels confirm menopausal status. TSH lab was normal.   6.  MRI of the brain on 1/31/17 to evaluate for any evidence of metastatic disease to complete staging workup was negative for metastatic malignancy to the brain. CT head done in March 2017 in ED was also ok.  7. Continue to proceed with ibrance/letrozole.  She appears to be tolerating this well so far with no significant side effects.   8. Her medical insurance denied genetic testing at [facility] with regard to her breast cancer. I will look at alternative lab options where this might be covered.  9. She has been advised to take calcium and vit d supplementation everyday. She is on xgeva injections for treatment of bone involvement by malignancy. Risks/benefits and common side effects discussed and she expressed understanding and agreement. She has dentures.  She will proceed with her next scheduled dose of Xgeva on October 11, 2017.  10. Continue Lasix PRN for treatment of bilateral lower extremity edema.  Supplemental potassium was given as well.  Advised to use compression stockings and elevate legs.  11.  She will continue follow up with with Dr. Jason with endocrinology for follow up of papillary thyroid carcinoma.    12.  Results of MRI of the thoracic and lumbar spine from 9/21/17 for evaluation of current acute symptoms related to back pain reviewed in detail with pateint. No  acute process involving the spinal cord is noted. She does have bone involvement by malignancy in multiple areas. However the PET/CT done on 9/27/17 shows no evidence of new areas of bony or visceral involvement.  Hence there appears to be no evidence of metastatic disease progression.  However since the patient continues to have low back pain and MRI of lumbar spine does show enhancing marrow replacing metastatic lesions are seen in the bilateral iliac bones posteriorly I have recommended radiation oncology consultation to discuss whether palliative XRT to this location will help with her symptoms.  If this is not deemed to be beneficial then I would recommend follow-up with orthopedics and/or pain management to discuss any interventional options that may be available to treat this problem from an oncologic/anatomic/degenerative disease perspective.  13.  The patient will be scheduled appointment with Dr. Guerin with clinical psychology as soon as possible to discuss additional evaluation management recommendations for her chronic anxiety and depression.  The patient has been advised to seek immediate medical attention if any suicidal/homicidal ideation.  She expressed understanding.  14.  The patient will be given the influenza vaccination today.    Return to clinic in 4 weeks with labs to discuss further management and for her next dose of Xgeva.  She knows to call sooner for any new problems or questions.    I spent greater than 40 minutes face-to-face with the patient discussing and reviewing all of the above in detail with greater than 50% of this time spent in counseling.    Richard Delong MD

## 2017-10-13 ENCOUNTER — INITIAL CONSULT (OUTPATIENT)
Dept: RADIATION ONCOLOGY | Facility: CLINIC | Age: 47
End: 2017-10-13
Payer: MEDICAID

## 2017-10-13 VITALS
WEIGHT: 216.06 LBS | HEIGHT: 67 IN | SYSTOLIC BLOOD PRESSURE: 121 MMHG | DIASTOLIC BLOOD PRESSURE: 83 MMHG | BODY MASS INDEX: 33.91 KG/M2 | HEART RATE: 92 BPM | TEMPERATURE: 97 F | RESPIRATION RATE: 18 BRPM

## 2017-10-13 DIAGNOSIS — C79.51 SECONDARY CANCER OF BONE: Primary | ICD-10-CM

## 2017-10-13 DIAGNOSIS — C50.811 MALIGNANT NEOPLASM OF OVERLAPPING SITES OF RIGHT BREAST IN FEMALE, ESTROGEN RECEPTOR POSITIVE: ICD-10-CM

## 2017-10-13 DIAGNOSIS — Z17.0 MALIGNANT NEOPLASM OF OVERLAPPING SITES OF RIGHT BREAST IN FEMALE, ESTROGEN RECEPTOR POSITIVE: ICD-10-CM

## 2017-10-13 PROCEDURE — 99213 OFFICE O/P EST LOW 20 MIN: CPT | Mod: PBBFAC | Performed by: RADIOLOGY

## 2017-10-13 PROCEDURE — 99999 PR PBB SHADOW E&M-EST. PATIENT-LVL III: CPT | Mod: PBBFAC,,, | Performed by: RADIOLOGY

## 2017-10-13 PROCEDURE — 99203 OFFICE O/P NEW LOW 30 MIN: CPT | Mod: S$PBB,,, | Performed by: RADIOLOGY

## 2017-10-13 NOTE — PROGRESS NOTES
HISTORY OF PRESENT ILLNESS:   This patient presents for discussion of possible palliative radiotherapy for metastatic breast cancer.     Mrs. Flores's history dates back to November when she presented for evaluation of postmenopausal bleeding.  As part of her evaluation she underwent a MMG at [facility] which revealed a suspicious Rt. breast.  Physical exam revealed 4.5 x 2.5 cm in the UOQ of the mass along with a palpable Rt. axillary lymph node.  There was also a separate 1.2 cm mass on MMG.  Biopsies of the breast masses revealed infiltrating ductal carcinoma.  The tumor was ER/ND positive, Her - 2 negative. Further work up with PET scan on  1/23/17 revealed FDG avid right breast mass in the subareolar region with smaller subcentimeter FDG avid nodule noted at the junction of the lateral breast/axilla.  FDG avid metastatic right axillary lymph nodes along with 3 osseous metastatic lesions one involving the manubrium and 2 involving the posterior left ilium consistent with metastatic disease. The patient began hormonal therapy with Letrozole and IBRANCE.  Repeat PET scan in July revealed no FDG avid disease identified with essentially complete resolution of the previously noted right axillary adenopathy and right breast mass.  No FDG avidity associated with osseous lesions.  Recently the patient has experienced increasing low back pain.  The patient has increased in severity and now she complains of a constant pain in the lower back.  Unassociated with walking.  She states she had some intermittent pain in this area at the time of diagnosis.  MRI of the thoracic and lumbar spin on 9/20/17 revealed  enhancing bone marrow replacing lesions seen in the right lamina of T2, left pedicle of T3, vertebral body of T5, right pedicle of T5, vertebral body of T6, and superior articular facet on the left of T9.  There were also enhancing marrow replacing metastatic lesions are seen in the bilateral iliac bones  posteriorly, left side 1.6 cm and right side 1.0 cm.  There is a single bone marrow replacing lesion in the lumbar spine noted in the L4 vertebral body measuring 4 mm. PET scan on 9/27/17 again reveals stable appearance of non-FDG avid osseus metastatic lesions involving the left ilium and manubrium.  The patient is now referred for consideration of possible palliative radiotherapy.  Today, the patient is complaining of low back pain.  Slightly greater on the Rt.     REVIEW OF SYSTEMS:   Review of Systems   Constitutional: Negative for chills, fever, malaise/fatigue and weight loss.   Cardiovascular: Negative for chest pain and palpitations.   Gastrointestinal: Negative for abdominal pain, constipation and diarrhea.   Genitourinary: Negative for dysuria, frequency and urgency.   Musculoskeletal: Positive for back pain. Negative for neck pain.       PAST MEDICAL HISTORY:  Past Medical History:   Diagnosis Date    Anxiety     Asthma     Cancer     right breast, metastatic-lymph nodes, bone, and thyroid     COPD (chronic obstructive pulmonary disease)     Depression     Hypothyroidism     Miscarriage     five miscarriages    Obesity     Thyroid cancer 2017       PAST SURGICAL HISTORY:  Past Surgical History:   Procedure Laterality Date    ABSCESS DRAINAGE      bilateral axilla    ADENOIDECTOMY      APPENDECTOMY      CHOLECYSTECTOMY      SKIN GRAFT  06/16/2017    THYROIDECTOMY Bilateral     TONSILLECTOMY         ALLERGIES:   Review of patient's allergies indicates:   Allergen Reactions    Vancomycin analogues Itching       MEDICATIONS:  Current Outpatient Prescriptions   Medication Sig    albuterol 90 mcg/actuation inhaler Inhale 2 puffs into the lungs every 6 (six) hours.    alprazolam (XANAX) 0.5 MG tablet Take 1 tablet (0.5 mg total) by mouth 2 (two) times daily as needed for Insomnia or Anxiety.    buPROPion (WELLBUTRIN) 100 MG tablet Take 1 tablet (100 mg total) by mouth 2 (two) times daily.     calcium carbonate 400 mg calcium (1,000 mg) Chew Take 2,000 mg by mouth once daily.    citalopram (CELEXA) 20 MG tablet Take 1 tablet (20 mg total) by mouth once daily. (Patient taking differently: Take 20 mg by mouth every evening. )    ergocalciferol (VITAMIN D2) 50,000 unit Cap Take 5,000 Units by mouth once daily at 6am.     furosemide (LASIX) 20 MG tablet Take 1 tablet (20 mg total) by mouth once daily.    hydrocodone-acetaminophen 7.5-325mg (NORCO) 7.5-325 mg per tablet Take 1 tablet by mouth every 6 (six) hours as needed for Pain.    IBRANCE 125 mg Cap TAKE 1 CAPSULE (125 MG) DAILY FOR 21 DAYS, FOLLOWED BY 7 DAY REST PERIOD TO COMPLETE A 28 DAY TREATMENT CYCLE    letrozole (FEMARA) 2.5 mg Tab TAKE ONE TABLET BY MOUTH ONCE DAILY (Patient taking differently: TAKE ONE TABLET BY MOUTH ONCE NIGHTLY)    letrozole (FEMARA) 2.5 mg Tab Take 1 tablet (2.5 mg total) by mouth once daily.    levothyroxine (SYNTHROID) 125 MCG tablet Take 1 tablet (125 mcg total) by mouth once daily.    multivitamin (THERAGRAN) per tablet Take 1 tablet by mouth once daily.    ondansetron (ZOFRAN) 8 MG tablet Take 1 tablet (8 mg total) by mouth every 12 (twelve) hours as needed for Nausea.    potassium chloride SA (K-DUR,KLOR-CON) 20 MEQ tablet Take 1 tablet (20 mEq total) by mouth once daily.    prochlorperazine (COMPAZINE) 5 MG tablet Take 1 tablet (5 mg total) by mouth every 6 (six) hours as needed for Nausea.     No current facility-administered medications for this visit.        SOCIAL HISTORY:  Social History     Social History    Marital status:      Spouse name: N/A    Number of children: N/A    Years of education: N/A     Occupational History    Not on file.     Social History Main Topics    Smoking status: Former Smoker     Packs/day: 1.00     Types: Cigarettes     Quit date: 6/11/2017    Smokeless tobacco: Never Used      Comment: pt still smoking/unable to afford chantix    Alcohol use No    Drug use:  No    Sexual activity: Yes     Partners: Male     Other Topics Concern    Not on file     Social History Narrative     with two adult children.       FAMILY HISTORY:  Family History   Problem Relation Age of Onset    Arthritis Mother     Early death Mother      64 at time of death    Heart attack Mother     Heart disease Mother     Heart failure Mother     Hypertension Mother     Coronary artery disease Father     Hearing loss Father     Heart disease Father     Hyperlipidemia Father     Hypertension Father     Mental illness Father     Learning disabilities Son          PHYSICAL EXAMINATION:  Vitals:    10/13/17 1312   BP: 121/83   Pulse: 92   Resp: 18   Temp: 97.3 °F (36.3 °C)     Physical Exam   Constitutional: She is oriented to person, place, and time. She appears distressed (the patient is in mild to moderate distress secondary to low back pain).   Musculoskeletal:        Lumbar back: She exhibits tenderness, bony tenderness and pain. She exhibits no swelling and no edema.   Neurological: She is oriented to person, place, and time.       ASSESSMENT/PLAN:  Stage IV ER positive breast cancer.     ECO    Long discussion with the patent and her .  Discussed the rational for consideration of radiotherapy in this setting.  Discussed the role of radiotherapy in the treatment of metastatic disease to the bone.  The patient appears to have a very good response to her hormonal therapy.  Of note, though, is she has more extensive disease on MRI than her PET scan would indicate.  Given her symptoms, I think if would be reasonable to offer a course of palliative radiotherapy to the ilium in an effort to improve her pain.  Discussed the procedures, risks and benefits of therapy.  Explained radiotherapy in this area is tolerated well.  The patient was agreeable to proceed with treatment.  Will plan simulation with treatment to begin thereafter.     Psychosocial Distress screening score of  Distress Score: 7 noted and reviewed. The patient is being followed by oncology social workers.     I spent approximately 40 minutes reviewing the available records and evaluating the patient, out of which over 50% of the time was spent face to face with the patient in counseling and coordinating this patient's care.

## 2017-10-13 NOTE — LETTER
October 13, 2017      Richard Delong MD  [address]  [facility] [address]           [facility]  [address] [place] [id number]  Phone: [phone]  Fax: [phone]          Patient: Josey Flores   MR Number: [id number]   YOB: 1970   Date of Visit: 10/13/2017       Dear Dr. Richard Delong:    Thank you for referring Josey Flores to me for evaluation. Attached you will find relevant portions of my assessment and plan of care.    If you have questions, please do not hesitate to call me. I look forward to following Josey Flores along with you.    Sincerely,    Shorty Frankel Jr., MD    Enclosure  CC:  No Recipients    If you would like to receive this communication electronically, please contact [email or web address] or [phone] to request more information on [employer] Link access.    For providers and/or their staff who would like to refer a patient to [facility], please contact us through our one-stop-shop provider referral line, [facility] , at [phone].    If you feel you have received this communication in error or would no longer like to receive these types of communications, please e-mail [email or web address]

## 2017-10-16 ENCOUNTER — HOSPITAL ENCOUNTER (OUTPATIENT)
Dept: RADIOLOGY | Facility: HOSPITAL | Age: 47
Discharge: HOME | End: 2017-10-16
Attending: RADIOLOGY
Payer: MEDICAID

## 2017-10-16 ENCOUNTER — HOSPITAL ENCOUNTER (OUTPATIENT)
Dept: RADIATION THERAPY | Facility: HOSPITAL | Age: 47
Discharge: HOME | End: 2017-10-16
Attending: RADIOLOGY
Payer: MEDICAID

## 2017-10-16 PROCEDURE — 77290 THER RAD SIMULAJ FIELD CPLX: CPT | Mod: TC | Performed by: RADIOLOGY

## 2017-10-16 PROCEDURE — 77290 THER RAD SIMULAJ FIELD CPLX: CPT | Mod: 26,,, | Performed by: RADIOLOGY

## 2017-10-16 PROCEDURE — 77334 RADIATION TREATMENT AID(S): CPT | Mod: TC | Performed by: RADIOLOGY

## 2017-10-16 PROCEDURE — 77263 THER RADIOLOGY TX PLNG CPLX: CPT | Mod: ,,, | Performed by: RADIOLOGY

## 2017-10-16 PROCEDURE — 77014 HC CT GUIDANCE RADIATION THERAPY FLDS PLACEMENT: CPT | Mod: TC | Performed by: RADIOLOGY

## 2017-10-16 PROCEDURE — 77334 RADIATION TREATMENT AID(S): CPT | Mod: 26,,, | Performed by: RADIOLOGY

## 2017-10-17 PROCEDURE — 77295 3-D RADIOTHERAPY PLAN: CPT | Mod: TC | Performed by: RADIOLOGY

## 2017-10-17 PROCEDURE — 77334 RADIATION TREATMENT AID(S): CPT | Mod: TC | Performed by: RADIOLOGY

## 2017-10-17 PROCEDURE — 77334 RADIATION TREATMENT AID(S): CPT | Mod: 26,,, | Performed by: RADIOLOGY

## 2017-10-17 PROCEDURE — 77295 3-D RADIOTHERAPY PLAN: CPT | Mod: 26,,, | Performed by: RADIOLOGY

## 2017-10-17 PROCEDURE — 77300 RADIATION THERAPY DOSE PLAN: CPT | Mod: 26,,, | Performed by: RADIOLOGY

## 2017-10-17 PROCEDURE — 77300 RADIATION THERAPY DOSE PLAN: CPT | Mod: TC | Performed by: RADIOLOGY

## 2017-10-18 ENCOUNTER — SOCIAL WORK (OUTPATIENT)
Dept: HEMATOLOGY/ONCOLOGY | Facility: CLINIC | Age: 47
End: 2017-10-18

## 2017-10-18 PROCEDURE — 77412 RADIATION TX DELIVERY LVL 3: CPT | Performed by: RADIOLOGY

## 2017-10-18 PROCEDURE — 77417 THER RADIOLOGY PORT IMAGE(S): CPT | Performed by: RADIOLOGY

## 2017-10-18 PROCEDURE — 77387 GUIDANCE FOR RADJ TX DLVR: CPT | Mod: TC | Performed by: RADIOLOGY

## 2017-10-18 PROCEDURE — 77387 GUIDANCE FOR RADJ TX DLVR: CPT | Mod: 26,,, | Performed by: RADIOLOGY

## 2017-10-18 NOTE — PROGRESS NOTES
DAYANNA met with pt, her partner and her niece in rad/Onc clinic today for introductions. DAYANNA provided pt a checklist of potential needs and services. Pt indicated she has been feeling depressed and anxious. Pt explained that every time she goes for another test, they find cancer in a different spot. Pt is disheartened by all of this news and reported it is getting her down. Pt is interested in an appt with our oncology psychologist. Pt confirmed that October 25 at 10 am is fine as she has another appt at [facility] on this day. SW encouraged pt to look through checklist to see if we could help her with other needs. SW provided pt with her contact info as well. DAYANNA will [facility] to f/u about checklist.

## 2017-10-19 ENCOUNTER — TELEPHONE (OUTPATIENT)
Dept: HEMATOLOGY/ONCOLOGY | Facility: CLINIC | Age: 47
End: 2017-10-19

## 2017-10-19 DIAGNOSIS — F41.9 ANXIETY: ICD-10-CM

## 2017-10-19 DIAGNOSIS — G89.3 NEOPLASM RELATED PAIN: ICD-10-CM

## 2017-10-19 DIAGNOSIS — Z17.0 MALIGNANT NEOPLASM OF OVERLAPPING SITES OF RIGHT BREAST IN FEMALE, ESTROGEN RECEPTOR POSITIVE: ICD-10-CM

## 2017-10-19 DIAGNOSIS — C79.51 SECONDARY CANCER OF BONE: ICD-10-CM

## 2017-10-19 DIAGNOSIS — C50.811 MALIGNANT NEOPLASM OF OVERLAPPING SITES OF RIGHT BREAST IN FEMALE, ESTROGEN RECEPTOR POSITIVE: ICD-10-CM

## 2017-10-19 DIAGNOSIS — C77.3 MALIGNANT NEOPLASM METASTATIC TO LYMPH NODE OF AXILLA: ICD-10-CM

## 2017-10-19 PROCEDURE — 77387 GUIDANCE FOR RADJ TX DLVR: CPT | Mod: 26,,, | Performed by: RADIOLOGY

## 2017-10-19 PROCEDURE — 77387 GUIDANCE FOR RADJ TX DLVR: CPT | Mod: TC | Performed by: RADIOLOGY

## 2017-10-19 PROCEDURE — 77412 RADIATION TX DELIVERY LVL 3: CPT | Performed by: RADIOLOGY

## 2017-10-19 RX ORDER — HYDROCODONE BITARTRATE AND ACETAMINOPHEN 7.5; 325 MG/1; MG/1
1 TABLET ORAL EVERY 6 HOURS PRN
Qty: 60 TABLET | Refills: 0 | Status: SHIPPED | OUTPATIENT
Start: 2017-10-19 | End: 2017-11-06 | Stop reason: SDUPTHER

## 2017-10-19 NOTE — TELEPHONE ENCOUNTER
----- Message from Juliet Gale MA sent at 10/19/2017  3:51 PM CDT -----  Contact: pt   Yes it's the norco to [facility] in [place]  thanks  ----- Message -----  From: Juliet Gale MA  Sent: 10/19/2017   9:39 AM  To: Richard Delong MD        ----- Message -----  From: Chloe Kidd  Sent: 10/19/2017   9:31 AM  To: Baljeet Ramos Staff    She's calling to get a refill...    1. What is the name of the medication you are requesting? Norco  2. What is the dose? 7.5 mg  3. How do you take the medication? Orally, topically, etc? orally  4. How often do you take this medication? As needed  5. Do you need a 30 day or 90 day supply? 30-day  6. How many refills are you requesting? 1  7. What is your preferred pharmacy and location of the pharmacy? [facility] in [place]  8. Who can we contact with further questions? [phone]

## 2017-10-20 ENCOUNTER — SOCIAL WORK (OUTPATIENT)
Dept: HEMATOLOGY/ONCOLOGY | Facility: CLINIC | Age: 47
End: 2017-10-20

## 2017-10-20 PROCEDURE — 77387 GUIDANCE FOR RADJ TX DLVR: CPT | Mod: 26,,, | Performed by: RADIOLOGY

## 2017-10-20 PROCEDURE — 77412 RADIATION TX DELIVERY LVL 3: CPT | Performed by: RADIOLOGY

## 2017-10-20 PROCEDURE — 77387 GUIDANCE FOR RADJ TX DLVR: CPT | Mod: TC | Performed by: RADIOLOGY

## 2017-10-20 NOTE — PROGRESS NOTES
DAYANNA met with pt to f/u about dr. Guerin. SW informed her of her appt next Wednesday at [facility]. Pt explained that it will be difficult for her to make all of her appts next week - [facility] lab, then [facility] RT, and then Dr. Guerin at [facility]. SW went to Rad/Onc nurse who helped pt change her schedule to be more convenient for pt. Pt was satisfied with changes and feels better about making her Dr. Guerin appt next Wednesday. SW will f/u with pt next week. Pt knows to contact SW should she need further assistance.

## 2017-10-23 ENCOUNTER — DOCUMENTATION ONLY (OUTPATIENT)
Dept: RADIATION ONCOLOGY | Facility: CLINIC | Age: 47
End: 2017-10-23

## 2017-10-23 ENCOUNTER — TELEPHONE (OUTPATIENT)
Dept: PULMONOLOGY | Facility: CLINIC | Age: 47
End: 2017-10-23

## 2017-10-23 ENCOUNTER — TELEPHONE (OUTPATIENT)
Dept: HEMATOLOGY/ONCOLOGY | Facility: CLINIC | Age: 47
End: 2017-10-23

## 2017-10-23 PROCEDURE — 77387 GUIDANCE FOR RADJ TX DLVR: CPT | Mod: ,,, | Performed by: RADIOLOGY

## 2017-10-23 PROCEDURE — 77412 RADIATION TX DELIVERY LVL 3: CPT | Performed by: RADIOLOGY

## 2017-10-23 PROCEDURE — 77387 GUIDANCE FOR RADJ TX DLVR: CPT | Mod: TC | Performed by: RADIOLOGY

## 2017-10-23 NOTE — PLAN OF CARE
Problem: Patient Care Overview  Goal: Plan of Care Review  Outcome: Ongoing (interventions implemented as appropriate)  Pt here for simulation to sacral spine.  Education on simulation given by MD at consult and reiterated today.  Aria h/o given on skin care and possible side effects; as well as clinic numbers and when to call; all to which pt verbalized understanding.  Will continue to monitor and f/u daily.

## 2017-10-23 NOTE — TELEPHONE ENCOUNTER
MSW intern attempted to contact pt to confirm appt with Dr. Guerin on Wed. Oct. 25, 2017 at 10am. There was not any answer, but a voicemail was left with brief contact info. F/u with pt tomorrow for confirmation.

## 2017-10-24 PROCEDURE — 77387 GUIDANCE FOR RADJ TX DLVR: CPT | Mod: TC | Performed by: RADIOLOGY

## 2017-10-24 PROCEDURE — 77387 GUIDANCE FOR RADJ TX DLVR: CPT | Mod: ,,, | Performed by: RADIOLOGY

## 2017-10-24 PROCEDURE — 77412 RADIATION TX DELIVERY LVL 3: CPT | Performed by: RADIOLOGY

## 2017-10-25 ENCOUNTER — OFFICE VISIT (OUTPATIENT)
Dept: HEMATOLOGY/ONCOLOGY | Facility: CLINIC | Age: 47
End: 2017-10-25
Payer: MEDICAID

## 2017-10-25 DIAGNOSIS — F43.23 ADJUSTMENT DISORDER WITH MIXED ANXIETY AND DEPRESSED MOOD: Primary | ICD-10-CM

## 2017-10-25 DIAGNOSIS — F41.1 GENERALIZED ANXIETY DISORDER: ICD-10-CM

## 2017-10-25 PROCEDURE — 77387 GUIDANCE FOR RADJ TX DLVR: CPT | Mod: TC | Performed by: RADIOLOGY

## 2017-10-25 PROCEDURE — 77336 RADIATION PHYSICS CONSULT: CPT | Performed by: RADIOLOGY

## 2017-10-25 PROCEDURE — 90791 PSYCH DIAGNOSTIC EVALUATION: CPT | Mod: HP,HB,S$PBB, | Performed by: PSYCHOLOGIST

## 2017-10-25 PROCEDURE — 77412 RADIATION TX DELIVERY LVL 3: CPT | Performed by: RADIOLOGY

## 2017-10-25 PROCEDURE — 90791 PSYCH DIAGNOSTIC EVALUATION: CPT | Mod: PBBFAC,PO | Performed by: PSYCHOLOGIST

## 2017-10-25 PROCEDURE — 99999 PR PBB SHADOW E&M-EST. PATIENT-LVL I: CPT | Mod: PBBFAC,,, | Performed by: PSYCHOLOGIST

## 2017-10-25 PROCEDURE — 77417 THER RADIOLOGY PORT IMAGE(S): CPT | Performed by: RADIOLOGY

## 2017-10-25 PROCEDURE — 99211 OFF/OP EST MAY X REQ PHY/QHP: CPT | Mod: PBBFAC,PO | Performed by: PSYCHOLOGIST

## 2017-10-25 PROCEDURE — 77387 GUIDANCE FOR RADJ TX DLVR: CPT | Mod: ,,, | Performed by: RADIOLOGY

## 2017-10-25 NOTE — LETTER
October 25, 2017        Richard Delong MD  [address] [facility] [address] [facility] [address] [id number]             [facility] - Hemot[facility] Oncology  [address]  [place] [address]  Phone: [phone]  Fax: [phone]   Patient: Josey Flores   MR Number: [id number]   YOB: 1970   Date of Visit: 10/25/2017       Dear Dr. Delong:    Thank you for referring Josey Flores to me for evaluation. Below are the relevant portions of my assessment and plan of care.     Josey Flores is a 46 y.o. female referred by Dr. Delong for psychological evaluation and treatment.  Ms. Flores is experiencing significant depression and heightened anxiety in response to cancer diagnosis and treatment. Patient is currently taking Wellbutrin, Xanax, and Celexa with ongoing prominent depression/anxiety symptoms.  A re-evaluation of her psychotropic medication regimen appears warranted (either by current providers or by psychiatry).  Given her difficulty sleeping, a sedating antidepressant may be useful if her regimen is reconsidered.  Ms. Flores states she is not comfortable with the idea of psychotherapy, but she is aware of options for psychological treatment if her wishes change in the future.  Ms. Flores has not participated in completing advanced directives.  The concept was introduced, but the patient was not interested in discussing these ideas further at the present time.  The association between activity management and mood was discussed with the patient and she was encouraged to identify possible enjoyable activities and recruit her partner and sister to assist her in greater engagement.  She will re-contact me, if needed/desired.  Close follow-up of her mood is warranted given historical suicidality and current life limiting illness and insomnia.     If you have questions, please do not hesitate to call me. I look forward to following Josey along with you.    Sincerely,      Oliver Guerin, PhD           CC  Shorty PATEL  MD Huma Frankel Jr., [facility]  Marcio Rashid MD
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
## 2017-10-26 ENCOUNTER — TELEPHONE (OUTPATIENT)
Dept: ENDOCRINOLOGY | Facility: CLINIC | Age: 47
End: 2017-10-26

## 2017-10-26 PROCEDURE — 77387 GUIDANCE FOR RADJ TX DLVR: CPT | Mod: TC | Performed by: RADIOLOGY

## 2017-10-26 PROCEDURE — 77387 GUIDANCE FOR RADJ TX DLVR: CPT | Mod: 26,,, | Performed by: RADIOLOGY

## 2017-10-26 PROCEDURE — 77412 RADIATION TX DELIVERY LVL 3: CPT | Performed by: RADIOLOGY

## 2017-10-26 NOTE — TELEPHONE ENCOUNTER
"Pt advised the following, per MD orders:thyroid labs suggest she is not taking any medication or she might be taking her calcium to close to her thyroid hormone.  It should be spaced 4 hours apart in her thyroid hormone should be taking with water only and she needs to wait 30 minutes before she eats, please find out if she is missed any doses.Pt verbalized understanding, stating, " I never miss my medication. I have to space it out."  "

## 2017-10-27 PROCEDURE — 77387 GUIDANCE FOR RADJ TX DLVR: CPT | Mod: ,,, | Performed by: RADIOLOGY

## 2017-10-27 PROCEDURE — 77387 GUIDANCE FOR RADJ TX DLVR: CPT | Mod: TC | Performed by: RADIOLOGY

## 2017-10-27 PROCEDURE — 77412 RADIATION TX DELIVERY LVL 3: CPT | Performed by: RADIOLOGY

## 2017-10-30 PROCEDURE — 77387 GUIDANCE FOR RADJ TX DLVR: CPT | Mod: ,,, | Performed by: RADIOLOGY

## 2017-10-30 PROCEDURE — 77412 RADIATION TX DELIVERY LVL 3: CPT | Performed by: RADIOLOGY

## 2017-10-30 PROCEDURE — 77387 GUIDANCE FOR RADJ TX DLVR: CPT | Mod: TC | Performed by: RADIOLOGY

## 2017-10-31 ENCOUNTER — DOCUMENTATION ONLY (OUTPATIENT)
Dept: RADIATION ONCOLOGY | Facility: CLINIC | Age: 47
End: 2017-10-31

## 2017-10-31 DIAGNOSIS — C50.811 MALIGNANT NEOPLASM OF OVERLAPPING SITES OF RIGHT BREAST IN FEMALE, ESTROGEN RECEPTOR POSITIVE: Primary | ICD-10-CM

## 2017-10-31 DIAGNOSIS — Z17.0 MALIGNANT NEOPLASM OF OVERLAPPING SITES OF RIGHT BREAST IN FEMALE, ESTROGEN RECEPTOR POSITIVE: Primary | ICD-10-CM

## 2017-10-31 PROCEDURE — 77412 RADIATION TX DELIVERY LVL 3: CPT | Performed by: RADIOLOGY

## 2017-10-31 PROCEDURE — 77387 GUIDANCE FOR RADJ TX DLVR: CPT | Mod: TC | Performed by: RADIOLOGY

## 2017-10-31 PROCEDURE — 77387 GUIDANCE FOR RADJ TX DLVR: CPT | Mod: ,,, | Performed by: RADIOLOGY

## 2017-10-31 RX ORDER — DIPHENOXYLATE HYDROCHLORIDE AND ATROPINE SULFATE 2.5; .025 MG/1; MG/1
1 TABLET ORAL 4 TIMES DAILY PRN
Qty: 30 TABLET | Refills: 1 | Status: SHIPPED | OUTPATIENT
Start: 2017-10-31 | End: 2017-11-06 | Stop reason: SDUPTHER

## 2017-11-01 ENCOUNTER — HOSPITAL ENCOUNTER (OUTPATIENT)
Dept: RADIATION THERAPY | Facility: HOSPITAL | Age: 47
Discharge: HOME | End: 2017-11-01
Attending: RADIOLOGY
Payer: MEDICAID

## 2017-11-01 ENCOUNTER — OFFICE VISIT (OUTPATIENT)
Dept: ENDOCRINOLOGY | Facility: CLINIC | Age: 47
End: 2017-11-01
Payer: MEDICAID

## 2017-11-01 VITALS
SYSTOLIC BLOOD PRESSURE: 104 MMHG | HEART RATE: 86 BPM | HEIGHT: 67 IN | WEIGHT: 214.31 LBS | TEMPERATURE: 97 F | BODY MASS INDEX: 33.64 KG/M2 | DIASTOLIC BLOOD PRESSURE: 76 MMHG

## 2017-11-01 DIAGNOSIS — C50.919 METASTATIC BREAST CANCER: ICD-10-CM

## 2017-11-01 DIAGNOSIS — Z85.850 HX OF THYROID CANCER: ICD-10-CM

## 2017-11-01 DIAGNOSIS — E89.0 POSTOPERATIVE HYPOTHYROIDISM: Primary | ICD-10-CM

## 2017-11-01 DIAGNOSIS — E83.51 HYPOCALCEMIA: ICD-10-CM

## 2017-11-01 PROCEDURE — 99213 OFFICE O/P EST LOW 20 MIN: CPT | Mod: PBBFAC,PN,25 | Performed by: INTERNAL MEDICINE

## 2017-11-01 PROCEDURE — 99214 OFFICE O/P EST MOD 30 MIN: CPT | Mod: S$PBB,,, | Performed by: INTERNAL MEDICINE

## 2017-11-01 PROCEDURE — 77336 RADIATION PHYSICS CONSULT: CPT | Performed by: RADIOLOGY

## 2017-11-01 PROCEDURE — 99999 PR PBB SHADOW E&M-EST. PATIENT-LVL III: CPT | Mod: PBBFAC,,, | Performed by: INTERNAL MEDICINE

## 2017-11-01 RX ORDER — LEVOTHYROXINE SODIUM 137 UG/1
137 TABLET ORAL
Qty: 30 TABLET | Refills: 3 | Status: SHIPPED | OUTPATIENT
Start: 2017-11-01 | End: 2018-03-15 | Stop reason: SDUPTHER

## 2017-11-01 NOTE — PROGRESS NOTES
Patient ID: Josey Flores is a 47 y.o. female.  Patient is here for follow up        Chief Complaint: Hypothyroidism      HPI  Patient with history of Metastatic multifocal infiltrating ductal carcinoma of the right breast with right axillary and bone involvement [ER positive/MI positive/HER-2 negative] for which she has received adjuvant therapy with good response with no FDG avid areas remaining regarding her breast cancer, who was found to have an FDG avid area on PET scan in the right thyroid, FNA was suspicious for papillary thyroid cancer, she subsequently underwent a total thyroidectomy performed by Dr. Zaid Baugh on August 31, 2017 and final pathology showed the following:  Thyroid, total thyroidectomy:  Papillary carcinoma, follicular variant, 2 cm, [id number].  Second focus of micropapillary carcinoma adjacent to main tumor.  Two lymph nodes negative for carcinoma.  See note and synoptic report below.  Note: Reviewed by Dr. Lois Ortiz who concurs with the diagnosis.  Synoptic report: Thyroid Gland  Procedure: Total thyroidectomy.  Tumor focality: Multifocal.  Tumor site: Right lobe.  Tumor size: 2 x 2 x 1.5 cm.  Histologic type: Papillary carcinoma, follicular variant, encapsulated/well demarcated, with tumor capsular  invasion.  Margins: Uninvolved by carcinoma.  Angioinvasion: Not identified.  Lymphatic invasion: Not identified.  Extrathyroidal extension: Not identified.  Regional lymph nodes:  -Number of lymph nodes involved: 0.  -Number of lymph nodes examined: 2 (incidental perithyroidal).  Pathologic Stage Classification: [id number].    She is currently on levothyroxine 125 µg and takes a multivitamin and calcium 2000 mg daily-TUMS  Postoperatively her calcium was low but most recent calcium is again normal  Patient is a smoker and has COPD and has chronic dyspnea on exertion    At last visit we discussed continuance of monitoring blood work including her thyroid function and  thyroglobulin  levels and may do periodic imaging tests as she decided she did not want to pursue any further treatment for the thyroid cancer given her stage IV breast cancer diagnosis    She has been suffering from depression and having a hard time adjusting to her diagnosis but today she says she is doing okay-she has been having tiredness and fatigue and feeling cold    Denies swelling other than her chronic intermittent ankle swelling that has not worsened    Since last visit however because of MRI findings showing metastasis in her spine and pelvis she has undergone radiation-this is caused her to have some diarrhea    Recent blood tests show she is very hypothyroid-TSH was 55 in thyroglobulin was 8.1-upon questioning patient is in fact taking her calcium and multivitamin within 30 minutes after the thyroid hormone as she and her spouse misunderstood the directions    Recently had another PET scan 2017 that showed the followin. Interval thyroidectomy with some uptake noted in the region of the thyroid bed which is thought to be postsurgical in nature.     2.  Stable appearance of non-FDG avid osseus metastatic lesions involving the left ilium and manubrium.    Her recent  I have reviewed the past medical, family and social history    Review of Systems   Constitutional: Positive for fatigue. Negative for appetite change, fever and unexpected weight change.   HENT: Negative for sore throat and trouble swallowing.    Eyes: Negative for visual disturbance.   Respiratory: Negative for shortness of breath and wheezing.    Cardiovascular: Negative for chest pain, palpitations and leg swelling.   Gastrointestinal: Positive for diarrhea. Negative for nausea and vomiting.   Endocrine: Positive for cold intolerance. Negative for heat intolerance, polydipsia, polyphagia and polyuria.   Genitourinary: Negative for difficulty urinating, dysuria and menstrual problem.   Musculoskeletal: Negative for arthralgias and joint  swelling.   Skin: Negative for rash.   Neurological: Negative for dizziness, weakness, numbness and headaches.   Psychiatric/Behavioral: Negative for confusion, dysphoric mood and sleep disturbance.       Objective:      Physical Exam   Constitutional: She appears well-developed and well-nourished. No distress.   HENT:   Head: Normocephalic and atraumatic.   Eyes: Conjunctivae are normal.   Neck: No JVD present. No tracheal deviation present. No thyromegaly present.   Cardiovascular: Normal rate, regular rhythm, normal heart sounds and intact distal pulses.  Exam reveals no gallop and no friction rub.    No murmur heard.  Pulmonary/Chest: Effort normal and breath sounds normal. No stridor. No respiratory distress. She has no wheezes. She has no rales. She exhibits no tenderness.   Musculoskeletal: She exhibits no edema or deformity.   Lymphadenopathy:     She has no cervical adenopathy.   Neurological: She is alert.   Skin: She is not diaphoretic.   Vitals reviewed.        Lab Review:   Lab Visit on 10/25/2017   Component Date Value    TSH 10/25/2017 51.479*    Free T4 10/25/2017 0.59*    T3, Free 10/25/2017 1.5*    Thyroglobulin Ab Screen 10/26/2017 <4.0     Thyroglobulin, Tumor Mar* 10/27/2017 8.1*    Thyroglobulin Antibody S* 10/27/2017 <1.8     Thyroglobulin Interpreta* 10/27/2017 SEE BELOW     Sodium 10/25/2017 140     Potassium 10/25/2017 4.2     Chloride 10/25/2017 101     CO2 10/25/2017 30*    Glucose 10/25/2017 96     BUN, Bld 10/25/2017 14     Creatinine 10/25/2017 1.3     Calcium 10/25/2017 9.7     Anion Gap 10/25/2017 9     eGFR if  10/25/2017 56.1*    eGFR if non  Amer* 10/25/2017 48.6*   Lab Visit on 10/11/2017   Component Date Value    WBC 10/11/2017 5.16     RBC 10/11/2017 3.52*    Hemoglobin 10/11/2017 12.9     Hematocrit 10/11/2017 37.1     MCV 10/11/2017 105*    MCH 10/11/2017 36.6*    MCHC 10/11/2017 34.8     RDW 10/11/2017 15.1*    Platelets  10/11/2017 276     MPV 10/11/2017 10.5     Gran # 10/11/2017 2.2     Lymph # 10/11/2017 2.5     Mono # 10/11/2017 0.4     Eos # 10/11/2017 0.1     Baso # 10/11/2017 0.03     Gran% 10/11/2017 42.8     Lymph% 10/11/2017 48.6*    Mono% 10/11/2017 6.8     Eosinophil% 10/11/2017 1.2     Basophil% 10/11/2017 0.6     Differential Method 10/11/2017 Automated     Sodium 10/11/2017 140     Potassium 10/11/2017 4.6     Chloride 10/11/2017 102     CO2 10/11/2017 29     Glucose 10/11/2017 101     BUN, Bld 10/11/2017 10     Creatinine 10/11/2017 1.3     Calcium 10/11/2017 9.9     Total Protein 10/11/2017 7.8     Albumin 10/11/2017 4.1     Total Bilirubin 10/11/2017 0.5     Alkaline Phosphatase 10/11/2017 71     AST 10/11/2017 15     ALT 10/11/2017 16     Anion Gap 10/11/2017 9     eGFR if  10/11/2017 56*    eGFR if non African Amer* 10/11/2017 49*   Lab Visit on 09/14/2017   Component Date Value    Specimen UA 09/14/2017 Urine, Clean Catch     Color, UA 09/14/2017 Yellow     Appearance, UA 09/14/2017 Clear     pH, UA 09/14/2017 6.0     Specific Gravity, UA 09/14/2017 1.010     Protein, UA 09/14/2017 Negative     Glucose, UA 09/14/2017 Negative     Ketones, UA 09/14/2017 Negative     Bilirubin (UA) 09/14/2017 Negative     Occult Blood UA 09/14/2017 1+*    Nitrite, UA 09/14/2017 Negative     Urobilinogen, UA 09/14/2017 Negative     Leukocytes, UA 09/14/2017 Negative     Urine Culture, Routine 09/16/2017 No significant growth     RBC, UA 09/14/2017 2     WBC, UA 09/14/2017 1     Bacteria, UA 09/14/2017 Rare     Yeast, UA 09/14/2017 Occasional*    Squam Epithel, UA 09/14/2017 1     Microscopic Comment 09/14/2017 SEE COMMENT    Lab Visit on 09/14/2017   Component Date Value    WBC 09/14/2017 5.44     RBC 09/14/2017 3.75*    Hemoglobin 09/14/2017 13.2     Hematocrit 09/14/2017 38.2     MCV 09/14/2017 102*    MCH 09/14/2017 35.2*    MCHC 09/14/2017 34.6     RDW  09/14/2017 15.7*    Platelets 09/14/2017 203     MPV 09/14/2017 10.0     Gran # 09/14/2017 2.6     Lymph # 09/14/2017 2.5     Mono # 09/14/2017 0.3     Eos # 09/14/2017 0.1     Baso # 09/14/2017 0.04     Gran% 09/14/2017 47.6     Lymph% 09/14/2017 46.0     Mono% 09/14/2017 4.6     Eosinophil% 09/14/2017 1.1     Basophil% 09/14/2017 0.7     Differential Method 09/14/2017 Automated     Sodium 09/14/2017 139     Potassium 09/14/2017 4.2     Chloride 09/14/2017 105     CO2 09/14/2017 23     Glucose 09/14/2017 113*    BUN, Bld 09/14/2017 8     Creatinine 09/14/2017 1.3     Calcium 09/14/2017 9.4     Total Protein 09/14/2017 7.2     Albumin 09/14/2017 3.6     Total Bilirubin 09/14/2017 0.3     Alkaline Phosphatase 09/14/2017 64     AST 09/14/2017 19     ALT 09/14/2017 36     Anion Gap 09/14/2017 11     eGFR if  09/14/2017 56*    eGFR if non African Amer* 09/14/2017 49*   Admission on 09/10/2017, Discharged on 09/10/2017   Component Date Value    WBC 09/10/2017 5.75     RBC 09/10/2017 3.73*    Hemoglobin 09/10/2017 13.2     Hematocrit 09/10/2017 38.0     MCV 09/10/2017 102*    MCH 09/10/2017 35.4*    MCHC 09/10/2017 34.7     RDW 09/10/2017 15.8*    Platelets 09/10/2017 291     MPV 09/10/2017 11.3     Gran # 09/10/2017 2.2     Lymph # 09/10/2017 3.2     Mono # 09/10/2017 0.3     Eos # 09/10/2017 0.1     Baso # 09/10/2017 0.03     Gran% 09/10/2017 38.1     Lymph% 09/10/2017 55.0*    Mono% 09/10/2017 4.7     Eosinophil% 09/10/2017 1.7     Basophil% 09/10/2017 0.5     Differential Method 09/10/2017 Automated     Specimen UA 09/10/2017 Urine, Clean Catch     Color, UA 09/10/2017 Yellow     Appearance, UA 09/10/2017 Clear     pH, UA 09/10/2017 6.0     Specific Gravity, UA 09/10/2017 <=1.005*    Protein, UA 09/10/2017 Negative     Glucose, UA 09/10/2017 Negative     Ketones, UA 09/10/2017 Negative     Bilirubin (UA) 09/10/2017 Negative     Occult Blood  UA 09/10/2017 1+*    Nitrite, UA 09/10/2017 Negative     Urobilinogen, UA 09/10/2017 Negative     Leukocytes, UA 09/10/2017 Negative     RBC, UA 09/10/2017 2     WBC, UA 09/10/2017 0     Bacteria, UA 09/10/2017 None     Squam Epithel, UA 09/10/2017 0     Microscopic Comment 09/10/2017 SEE COMMENT     Sodium 09/10/2017 138     Potassium 09/10/2017 4.0     Chloride 09/10/2017 101     CO2 09/10/2017 24     Glucose 09/10/2017 93     BUN, Bld 09/10/2017 8     Creatinine 09/10/2017 1.2     Calcium 09/10/2017 9.6     Anion Gap 09/10/2017 13     eGFR if African American 09/10/2017 >60     eGFR if non African Amer* 09/10/2017 54*   Admission on 08/31/2017, Discharged on 09/01/2017   Component Date Value    Calcium 08/31/2017 8.4*    Magnesium 08/31/2017 2.0     Calcium 08/31/2017 8.8     Calcium 09/01/2017 9.8     Calcium 09/01/2017 9.9     Calcium 09/01/2017 10.0    Lab Visit on 08/10/2017   Component Date Value    WBC 08/10/2017 6.02     RBC 08/10/2017 3.79*    Hemoglobin 08/10/2017 13.2     Hematocrit 08/10/2017 38.2     MCV 08/10/2017 101*    MCH 08/10/2017 34.8*    MCHC 08/10/2017 34.6     RDW 08/10/2017 14.6*    Platelets 08/10/2017 261     MPV 08/10/2017 10.9     Gran # 08/10/2017 2.9     Lymph # 08/10/2017 2.7     Mono # 08/10/2017 0.3     Eos # 08/10/2017 0.1     Baso # 08/10/2017 0.04     Gran% 08/10/2017 48.0     Lymph% 08/10/2017 44.2     Mono% 08/10/2017 5.3     Eosinophil% 08/10/2017 1.8     Basophil% 08/10/2017 0.7     Differential Method 08/10/2017 Automated     Sodium 08/10/2017 137     Potassium 08/10/2017 3.6     Chloride 08/10/2017 102     CO2 08/10/2017 26     Glucose 08/10/2017 123*    BUN, Bld 08/10/2017 13     Creatinine 08/10/2017 1.2     Calcium 08/10/2017 9.0     Total Protein 08/10/2017 7.4     Albumin 08/10/2017 3.7     Total Bilirubin 08/10/2017 0.4     Alkaline Phosphatase 08/10/2017 65     AST 08/10/2017 18     ALT 08/10/2017 19      Anion Gap 08/10/2017 9     eGFR if African American 08/10/2017 >60     eGFR if non African Amer* 08/10/2017 54*   Procedure visit on 08/09/2017   Component Date Value    Pre FEV1 09/28/2017 2.452033*    Pre FEV1 FVC 09/28/2017 84.30862     Pre FVC 09/28/2017 2.844*    Pre PEF 09/28/2017 3.31*   Lab Visit on 07/24/2017   Component Date Value    TSH 07/25/2017 0.855     Free T4 07/25/2017 0.74     PTH, Intact 07/25/2017 41.0    Lab Visit on 07/12/2017   Component Date Value    WBC 07/12/2017 4.61     RBC 07/12/2017 3.48*    Hemoglobin 07/12/2017 12.2     Hematocrit 07/12/2017 35.7*    MCV 07/12/2017 103*    MCH 07/12/2017 35.1*    MCHC 07/12/2017 34.2     RDW 07/12/2017 13.9     Platelets 07/12/2017 233     MPV 07/12/2017 10.6     Gran # 07/12/2017 1.5*    Lymph # 07/12/2017 2.6     Mono # 07/12/2017 0.4     Eos # 07/12/2017 0.1     Baso # 07/12/2017 0.03     Gran% 07/12/2017 32.7*    Lymph% 07/12/2017 56.2*    Mono% 07/12/2017 8.0     Eosinophil% 07/12/2017 2.6     Basophil% 07/12/2017 0.7     Differential Method 07/12/2017 Automated     Sodium 07/12/2017 140     Potassium 07/12/2017 3.8     Chloride 07/12/2017 103     CO2 07/12/2017 25     Glucose 07/12/2017 96     BUN, Bld 07/12/2017 9     Creatinine 07/12/2017 1.1     Calcium 07/12/2017 9.7     Total Protein 07/12/2017 7.3     Albumin 07/12/2017 3.6     Total Bilirubin 07/12/2017 0.2     Alkaline Phosphatase 07/12/2017 77     AST 07/12/2017 13     ALT 07/12/2017 14     Anion Gap 07/12/2017 12     eGFR if African American 07/12/2017 >60     eGFR if non African Amer* 07/12/2017 60    There may be more visits with results that are not included.       Assessment:     1. Postoperative hypothyroidism  TSH    T4, free    T3, free   2. Hx of thyroid cancer  Thyroglobulin   3. Hypocalcemia  Vitamin D    Basic metabolic panel    Alkaline phosphatase    ALBUMIN    PTH, intact    Phosphorus   4. Metastatic breast cancer      As  per oncology        Patient's recent abnormal thyroid function tests are the result of taking calcium in her multivitamin to close to her thyroid.  She will start from now on spacing them for hours apart and also we will increase dose to 137 µg in repeat labs in 4 weeks    And for osteoporosis prevention she can reduce her overall calcium intake to 1500 mg    Her stimulated thyroglobulin was elevated slightly and may be due to residual thyroid tissue    I will continue to monitor over time  Plan:   Postoperative hypothyroidism  -     TSH; Future; Expected date: 12/01/2017  -     T4, free; Future; Expected date: 12/01/2017  -     T3, free; Future; Expected date: 12/01/2017    Hx of thyroid cancer  -     Thyroglobulin; Future; Expected date: 12/01/2017    Hypocalcemia  -     Vitamin D; Future; Expected date: 12/01/2017  -     Basic metabolic panel; Future; Expected date: 12/01/2017  -     Alkaline phosphatase; Future; Expected date: 12/01/2017  -     ALBUMIN; Future; Expected date: 12/01/2017  -     PTH, intact; Future; Expected date: 12/01/2017  -     Phosphorus; Future; Expected date: 12/01/2017    Metastatic breast cancer  Comments:  As per oncology    Other orders  -     levothyroxine (SYNTHROID) 137 MCG Tab tablet; Take 1 tablet (137 mcg total) by mouth before breakfast.  Dispense: 30 tablet; Refill: 3          Return in about 3 months (around 2/1/2018).    Labs prior to appointment? yes     Disclaimer:  This note may have been partially prepared using voice recognition software and  it may have not been extensively proofed, as such there could be errors within the text such as sound alike errors.

## 2017-11-01 NOTE — PLAN OF CARE
Problem: Patient Care Overview  Goal: Plan of Care Review  Outcome: Outcome(s) achieved Date Met: 11/01/17  Pt here for RT to S spine #10/10.  Reports improvement in pain.  Having some diarrhea.  Saw Dr. Frankel today who prescribed Lomotil.  Will f/u with call next week. Instructed to call with any concerns in meantime.  Verbalized understanding.

## 2017-11-06 ENCOUNTER — TELEPHONE (OUTPATIENT)
Dept: HEMATOLOGY/ONCOLOGY | Facility: CLINIC | Age: 47
End: 2017-11-06

## 2017-11-06 ENCOUNTER — HOSPITAL ENCOUNTER (EMERGENCY)
Facility: HOSPITAL | Age: 47
Discharge: HOME | End: 2017-11-06
Attending: EMERGENCY MEDICINE
Payer: MEDICAID

## 2017-11-06 VITALS
DIASTOLIC BLOOD PRESSURE: 71 MMHG | WEIGHT: 210 LBS | TEMPERATURE: 98 F | HEIGHT: 67 IN | RESPIRATION RATE: 20 BRPM | OXYGEN SATURATION: 99 % | BODY MASS INDEX: 32.96 KG/M2 | SYSTOLIC BLOOD PRESSURE: 122 MMHG | HEART RATE: 69 BPM

## 2017-11-06 DIAGNOSIS — T66.XXXA RADIATION ADVERSE EFFECT, INITIAL ENCOUNTER: Primary | ICD-10-CM

## 2017-11-06 DIAGNOSIS — R19.7 DIARRHEA, UNSPECIFIED TYPE: ICD-10-CM

## 2017-11-06 DIAGNOSIS — Z17.0 MALIGNANT NEOPLASM OF OVERLAPPING SITES OF RIGHT BREAST IN FEMALE, ESTROGEN RECEPTOR POSITIVE: ICD-10-CM

## 2017-11-06 DIAGNOSIS — C50.811 MALIGNANT NEOPLASM OF OVERLAPPING SITES OF RIGHT BREAST IN FEMALE, ESTROGEN RECEPTOR POSITIVE: ICD-10-CM

## 2017-11-06 DIAGNOSIS — F41.9 ANXIETY: ICD-10-CM

## 2017-11-06 DIAGNOSIS — G89.3 NEOPLASM RELATED PAIN: ICD-10-CM

## 2017-11-06 DIAGNOSIS — C77.3 MALIGNANT NEOPLASM METASTATIC TO LYMPH NODE OF AXILLA: ICD-10-CM

## 2017-11-06 DIAGNOSIS — R10.9 ABDOMINAL PAIN, UNSPECIFIED ABDOMINAL LOCATION: ICD-10-CM

## 2017-11-06 DIAGNOSIS — C79.51 SECONDARY CANCER OF BONE: ICD-10-CM

## 2017-11-06 LAB
ALBUMIN SERPL BCP-MCNC: 3.9 G/DL
ALP SERPL-CCNC: 65 U/L
ALT SERPL W/O P-5'-P-CCNC: 16 U/L
ANION GAP SERPL CALC-SCNC: 9 MMOL/L
AST SERPL-CCNC: 17 U/L
BASOPHILS # BLD AUTO: 0.03 K/UL
BASOPHILS NFR BLD: 0.9 %
BILIRUB SERPL-MCNC: 0.4 MG/DL
BILIRUB UR QL STRIP: NEGATIVE
BUN SERPL-MCNC: 8 MG/DL
CALCIUM SERPL-MCNC: 9.3 MG/DL
CHLORIDE SERPL-SCNC: 105 MMOL/L
CLARITY UR: CLEAR
CO2 SERPL-SCNC: 25 MMOL/L
COLOR UR: YELLOW
CREAT SERPL-MCNC: 1.1 MG/DL
DIFFERENTIAL METHOD: ABNORMAL
EOSINOPHIL # BLD AUTO: 0.1 K/UL
EOSINOPHIL NFR BLD: 2.1 %
ERYTHROCYTE [DISTWIDTH] IN BLOOD BY AUTOMATED COUNT: 13.5 %
EST. GFR  (AFRICAN AMERICAN): >60 ML/MIN/1.73 M^2
EST. GFR  (NON AFRICAN AMERICAN): 59 ML/MIN/1.73 M^2
GLUCOSE SERPL-MCNC: 86 MG/DL
GLUCOSE UR QL STRIP: NEGATIVE
HCT VFR BLD AUTO: 34 %
HGB BLD-MCNC: 11.7 G/DL
HGB UR QL STRIP: ABNORMAL
KETONES UR QL STRIP: NEGATIVE
LEUKOCYTE ESTERASE UR QL STRIP: NEGATIVE
LIPASE SERPL-CCNC: 32 U/L
LYMPHOCYTES # BLD AUTO: 1.3 K/UL
LYMPHOCYTES NFR BLD: 37.6 %
MCH RBC QN AUTO: 38 PG
MCHC RBC AUTO-ENTMCNC: 34.4 G/DL
MCV RBC AUTO: 110 FL
MICROSCOPIC COMMENT: NORMAL
MONOCYTES # BLD AUTO: 0.3 K/UL
MONOCYTES NFR BLD: 8.7 %
NEUTROPHILS # BLD AUTO: 1.7 K/UL
NEUTROPHILS NFR BLD: 50.7 %
NITRITE UR QL STRIP: NEGATIVE
PH UR STRIP: 5 [PH] (ref 5–8)
PLATELET # BLD AUTO: 243 K/UL
PMV BLD AUTO: 10.2 FL
POTASSIUM SERPL-SCNC: 3.8 MMOL/L
PROT SERPL-MCNC: 7.5 G/DL
PROT UR QL STRIP: NEGATIVE
RBC # BLD AUTO: 3.08 M/UL
RBC #/AREA URNS HPF: 2 /HPF (ref 0–4)
SODIUM SERPL-SCNC: 139 MMOL/L
SP GR UR STRIP: 1 (ref 1–1.03)
URN SPEC COLLECT METH UR: ABNORMAL
UROBILINOGEN UR STRIP-ACNC: NEGATIVE EU/DL
WBC # BLD AUTO: 3.35 K/UL
WBC #/AREA URNS HPF: 2 /HPF (ref 0–5)

## 2017-11-06 PROCEDURE — 36415 COLL VENOUS BLD VENIPUNCTURE: CPT

## 2017-11-06 PROCEDURE — 81000 URINALYSIS NONAUTO W/SCOPE: CPT

## 2017-11-06 PROCEDURE — 83690 ASSAY OF LIPASE: CPT

## 2017-11-06 PROCEDURE — 25000003 PHARM REV CODE 250: Performed by: EMERGENCY MEDICINE

## 2017-11-06 PROCEDURE — 96365 THER/PROPH/DIAG IV INF INIT: CPT

## 2017-11-06 PROCEDURE — 80053 COMPREHEN METABOLIC PANEL: CPT

## 2017-11-06 PROCEDURE — 85025 COMPLETE CBC W/AUTO DIFF WBC: CPT

## 2017-11-06 PROCEDURE — 63600175 PHARM REV CODE 636 W HCPCS: Performed by: EMERGENCY MEDICINE

## 2017-11-06 PROCEDURE — 99284 EMERGENCY DEPT VISIT MOD MDM: CPT | Mod: 25

## 2017-11-06 PROCEDURE — 96375 TX/PRO/DX INJ NEW DRUG ADDON: CPT

## 2017-11-06 RX ORDER — MORPHINE SULFATE 10 MG/ML
4 INJECTION INTRAMUSCULAR; INTRAVENOUS; SUBCUTANEOUS
Status: COMPLETED | OUTPATIENT
Start: 2017-11-06 | End: 2017-11-06

## 2017-11-06 RX ORDER — HYDROCODONE BITARTRATE AND ACETAMINOPHEN 5; 325 MG/1; MG/1
1 TABLET ORAL EVERY 4 HOURS PRN
Qty: 12 TABLET | Refills: 0 | Status: SHIPPED | OUTPATIENT
Start: 2017-11-06 | End: 2017-11-16

## 2017-11-06 RX ORDER — DIPHENOXYLATE HYDROCHLORIDE AND ATROPINE SULFATE 2.5; .025 MG/1; MG/1
1 TABLET ORAL 4 TIMES DAILY PRN
Qty: 30 TABLET | Refills: 0 | Status: SHIPPED | OUTPATIENT
Start: 2017-11-06 | End: 2017-11-16

## 2017-11-06 RX ORDER — MORPHINE SULFATE 2 MG/ML
4 INJECTION, SOLUTION INTRAMUSCULAR; INTRAVENOUS
Status: DISCONTINUED | OUTPATIENT
Start: 2017-11-06 | End: 2017-11-06

## 2017-11-06 RX ORDER — HYDROCODONE BITARTRATE AND ACETAMINOPHEN 7.5; 325 MG/1; MG/1
1 TABLET ORAL EVERY 6 HOURS PRN
Qty: 60 TABLET | Refills: 0 | Status: SHIPPED | OUTPATIENT
Start: 2017-11-06 | End: 2017-11-06 | Stop reason: SDUPTHER

## 2017-11-06 RX ADMIN — SODIUM CHLORIDE 1000 ML: 0.9 INJECTION, SOLUTION INTRAVENOUS at 04:11

## 2017-11-06 RX ADMIN — MORPHINE SULFATE 4 MG: 10 INJECTION, SOLUTION INTRAMUSCULAR; INTRAVENOUS at 04:11

## 2017-11-06 RX ADMIN — PROMETHAZINE HYDROCHLORIDE 12.5 MG: 25 INJECTION INTRAMUSCULAR; INTRAVENOUS at 04:11

## 2017-11-06 NOTE — ED NOTES
T/c to pharmacy regarding unable to pull morphine from the pyxis.  Pharmacy will change the order.

## 2017-11-06 NOTE — TELEPHONE ENCOUNTER
----- Message from Ashleigh Alejandro sent at 11/6/2017  1:22 PM CST -----  pls call pt on cell..[phone]

## 2017-11-06 NOTE — TELEPHONE ENCOUNTER
----- Message from Neetajames Bolanos sent at 11/6/2017 10:09 AM CST -----  Contact: pt  1. What is the name of the medication you are requesting? norco and xanax  2. What is the dose? 7.5 mg and 5 mg  3. How do you take the medication? Orally, topically, etc? orally  4. How often do you take this medication? Every 4 hours and the xanax as needed  5. Do you need a 30 day or 90 day supply? ?  6. How many refills are you requesting? ?  7. What is your preferred pharmacy and location of the pharmacy? .  [facility] [address] [place] [address] [id number] [place] [address]  [id number] [place] [id number]  Phone: [phone] Fax: [phone]  8. Who can we contact with further questions? Pt at [phone] or [phone]  Thank you

## 2017-11-06 NOTE — TELEPHONE ENCOUNTER
----- Message from Phan Huntley sent at 11/6/2017 10:45 AM CST -----  Contact: [phone]  Pt returning nurse call. Please call bk at [phone]. thx lj

## 2017-11-06 NOTE — ED NOTES
Pt resting in ER stretcher, aaox4, rr e/u, reports abdominal pain rated at 9/10. Pt remains on cardiac monitor with vss noted. Bed low and locked, call light in reach, side rails up x2.  Fiance at bedside.  Pt verbalized understanding of status and POC; denies requests medication for pain but further needs. Will continue to monitor.

## 2017-11-06 NOTE — ED PROVIDER NOTES
"SCRIBE #1 NOTE: I, Krysten Roman, am scribing for, and in the presence of, Pantera Nino Jr., MD. I have scribed the entire note.      History      Chief Complaint   Patient presents with    Abdominal Pain     Pt states, 'I have had diarrhea for a week and my stomach is hurting bad." Pt just finished radiation treatment.       Review of patient's allergies indicates:   Allergen Reactions    Vancomycin analogues Itching        HPI   HPI    11/6/2017, 4:18 PM   History obtained from the patient      History of Present Illness: Josey Flores is a 47 y.o. female patient who presents to the Emergency Department for 10/10 abd pain which onset gradually over 1 week ago. Pt states that she recently completed 10 days of radiation through her abd, and has been having these sxs ever since. She states that her last day of radiation Tx was 10/31/17. Pt reports a hx of cancer and states that she did chemo in the past. Symptoms are constant and moderate in severity.  No mitigating factors reported. Pt states that pain is worse when laying flat. Associated sxs include diarrhea and chills. Patient denies any fever, constipation, hematochezia, dysuria, hematuria, urinary frequency, urine decreased, difficulty urinating,  n/v and all other sxs at this time. No prior Tx secondary to having difficulty filling Lomotil Rx. Pt states that the pharmacy said that "since the Rx was printed on plain white paper, it is an additional $50 rather than the usual $3." Pt is requesting that the Rx be reprinted on Rx paper. No further complaints or concerns at this time.         Arrival mode: Personal vehicle    PCP: Aileen Sadler MD       Past Medical History:  Past Medical History:   Diagnosis Date    Anxiety     Asthma     Cancer     right breast, metastatic-lymph nodes, bone, and thyroid     COPD (chronic obstructive pulmonary disease)     Depression     History of psychiatric hospitalization     2000; suicidal ideation    Hx of " psychiatric care     Hypothyroidism     Miscarriage     five miscarriages    Obesity     Psychiatric problem     Thyroid cancer 2017       Past Surgical History:  Past Surgical History:   Procedure Laterality Date    ABSCESS DRAINAGE      bilateral axilla    ADENOIDECTOMY      APPENDECTOMY      CHOLECYSTECTOMY      SKIN GRAFT  06/16/2017    THYROIDECTOMY Bilateral     TONSILLECTOMY           Family History:  Family History   Problem Relation Age of Onset    Arthritis Mother     Early death Mother      64 at time of death    Heart attack Mother     Heart disease Mother     Heart failure Mother     Hypertension Mother     Coronary artery disease Father     Hearing loss Father     Heart disease Father     Hyperlipidemia Father     Hypertension Father     Mental illness Father     Learning disabilities Son        Social History:  Social History     Social History Main Topics    Smoking status: Current Every Day Smoker     Packs/day: 1.00     Years: 30.00     Types: Cigarettes     Last attempt to quit: 6/11/2017    Smokeless tobacco: Never Used      Comment: 45 pack year history    Alcohol use No    Drug use: No    Sexual activity: Yes     Partners: Male       ROS   Review of Systems   Constitutional: Positive for chills. Negative for fever.   HENT: Negative for sore throat.    Respiratory: Negative for shortness of breath.    Cardiovascular: Negative for chest pain.   Gastrointestinal: Positive for abdominal pain and diarrhea. Negative for blood in stool, constipation, nausea and vomiting.   Genitourinary: Negative for decreased urine volume, difficulty urinating, dysuria, frequency and hematuria.   Musculoskeletal: Negative for back pain.   Skin: Negative for rash.   Neurological: Negative for weakness.   Hematological: Does not bruise/bleed easily.       Physical Exam      Initial Vitals [11/06/17 1417]   BP Pulse Resp Temp SpO2   121/67 93 20 98.2 °F (36.8 °C) 97 %      MAP       85      "     Physical Exam  Nursing Notes and Vital Signs Reviewed.  Constitutional: Patient is in no apparent distress. Well-developed and well-nourished.  Head: Atraumatic. Normocephalic.  Eyes: PERRL. EOM intact. Conjunctivae are not pale. No scleral icterus.  ENT: Mucous membranes are dry. Oropharynx is clear and symmetric.    Neck: Supple. Full ROM. No lymphadenopathy.  Cardiovascular: Regular rate. Regular rhythm. No murmurs, rubs, or gallops. Distal pulses are 2+ and symmetric.  Pulmonary/Chest: No respiratory distress. Clear to auscultation bilaterally. No wheezing, rales, or rhonchi.  Abdominal: Soft and non-distended.  Diffuse tenderness.  Mild guarding. No rebound or rigidity. Good bowel sounds.  Genitourinary: No CVA tenderness  Musculoskeletal: Moves all extremities. No obvious deformities. No edema. No calf tenderness.  Skin: Warm and dry.  Neurological:  Alert, awake, and appropriate.  Normal speech.  No acute focal neurological deficits are appreciated.  Psychiatric: Normal affect. Good eye contact. Appropriate in content.    ED Course    Procedures  ED Vital Signs:  Vitals:    11/06/17 1417 11/06/17 1550 11/06/17 1729   BP: 121/67 116/63 122/71   Pulse: 93 73 69   Resp: 20 17 20   Temp: 98.2 °F (36.8 °C)     TempSrc: Oral     SpO2: 97% 96% 99%   Weight: 95.3 kg (210 lb)     Height: 5' 7" (1.702 m)         Abnormal Lab Results:  Labs Reviewed   CBC W/ AUTO DIFFERENTIAL - Abnormal; Notable for the following:        Result Value    WBC 3.35 (*)     RBC 3.08 (*)     Hemoglobin 11.7 (*)     Hematocrit 34.0 (*)      (*)     MCH 38.0 (*)     Gran # 1.7 (*)     All other components within normal limits   COMPREHENSIVE METABOLIC PANEL - Abnormal; Notable for the following:     eGFR if non  59 (*)     All other components within normal limits   URINALYSIS - Abnormal; Notable for the following:     Occult Blood UA 2+ (*)     All other components within normal limits   LIPASE   URINALYSIS " MICROSCOPIC        All Lab Results:  Results for orders placed or performed during the hospital encounter of 11/06/17   CBC W/ AUTO DIFFERENTIAL   Result Value Ref Range    WBC 3.35 (L) 3.90 - 12.70 K/uL    RBC 3.08 (L) 4.00 - 5.40 M/uL    Hemoglobin 11.7 (L) 12.0 - 16.0 g/dL    Hematocrit 34.0 (L) 37.0 - 48.5 %     (H) 82 - 98 fL    MCH 38.0 (H) 27.0 - 31.0 pg    MCHC 34.4 32.0 - 36.0 g/dL    RDW 13.5 11.5 - 14.5 %    Platelets 243 150 - 350 K/uL    MPV 10.2 9.2 - 12.9 fL    Gran # 1.7 (L) 1.8 - 7.7 K/uL    Lymph # 1.3 1.0 - 4.8 K/uL    Mono # 0.3 0.3 - 1.0 K/uL    Eos # 0.1 0.0 - 0.5 K/uL    Baso # 0.03 0.00 - 0.20 K/uL    Gran% 50.7 38.0 - 73.0 %    Lymph% 37.6 18.0 - 48.0 %    Mono% 8.7 4.0 - 15.0 %    Eosinophil% 2.1 0.0 - 8.0 %    Basophil% 0.9 0.0 - 1.9 %    Differential Method Automated    Comp. Metabolic Panel   Result Value Ref Range    Sodium 139 136 - 145 mmol/L    Potassium 3.8 3.5 - 5.1 mmol/L    Chloride 105 95 - 110 mmol/L    CO2 25 23 - 29 mmol/L    Glucose 86 70 - 110 mg/dL    BUN, Bld 8 6 - 20 mg/dL    Creatinine 1.1 0.5 - 1.4 mg/dL    Calcium 9.3 8.7 - 10.5 mg/dL    Total Protein 7.5 6.0 - 8.4 g/dL    Albumin 3.9 3.5 - 5.2 g/dL    Total Bilirubin 0.4 0.1 - 1.0 mg/dL    Alkaline Phosphatase 65 55 - 135 U/L    AST 17 10 - 40 U/L    ALT 16 10 - 44 U/L    Anion Gap 9 8 - 16 mmol/L    eGFR if African American >60 >60 mL/min/1.73 m^2    eGFR if non African American 59 (A) >60 mL/min/1.73 m^2   Lipase   Result Value Ref Range    Lipase 32 4 - 60 U/L   Urinalysis - Clean Catch   Result Value Ref Range    Specimen UA Urine, Clean Catch     Color, UA Yellow Yellow, Straw, [place]    Appearance, UA Clear Clear    pH, UA 5.0 5.0 - 8.0    Specific Gravity, UA 1.005 1.005 - 1.030    Protein, UA Negative Negative    Glucose, UA Negative Negative    Ketones, UA Negative Negative    Bilirubin (UA) Negative Negative    Occult Blood UA 2+ (A) Negative    Nitrite, UA Negative Negative    Urobilinogen, UA  Negative <2.0 EU/dL    Leukocytes, UA Negative Negative   Urinalysis Microscopic   Result Value Ref Range    RBC, UA 2 0 - 4 /hpf    WBC, UA 2 0 - 5 /hpf    Microscopic Comment SEE COMMENT          Imaging Results:  Imaging Results          CT Abdomen Pelvis  Without Contrast (Final result)  Result time 11/06/17 17:12:31    Final result by Ton Scott MD (11/06/17 17:12:31)                 Impression:      No definite acute or suspicious findings, however enterocolitis is not excluded.          All CT scans at this facility use dose modulation, iterative reconstruction and/or weight based dosing when appropriate to reduce radiation dose to as low as reasonably achievable.       Electronically signed by: TON SCOTT MD  Date:     11/06/17  Time:    17:12              Narrative:    Exam: CT ABDOMEN PELVIS WITHOUT CONTRAST,    Date:  11/06/17 16:58:53    History:  10/10 unspecified abdominal pain with onset gradually over one week ago. Patient states she completed 10 days radiation through her abdomen has been having these symptoms ever since. Last a radiation treatment was 10/31/17. History of breast cancer.    Comparison:  1/17/17 CT abdomen, 7/12/17 PET/CT    Findings: No abnormality of the unenhanced liver or pancreas is seen. Multiple small calcified granulomata of the spleen. Prior cholecystectomy. No abnormality of the unenhanced adrenals or kidneys. The uterus and adnexal structures are unremarkable. No definite acute inflammatory process is seen. However there is a somewhat thickwalled appearance of the sigmoid and distal descending colon. Can't exclude colitis. There are also some small bowel loops which may be slightly thick walled. Evaluation limited without IV and oral contrast. Can't exclude enteritis. There is no appreciable mesenteric stranding. No acute or suspicious findings at the lung bases.                                      The Emergency Provider reviewed the vital signs and test  results, which are outlined above.    ED Discussion     5:17 PM:  Discussed with pt all pertinent ED information and results. Discussed pt dx and plan of tx. Gave pt all f/u and return to the ED instructions. All questions and concerns were addressed at this time. Pt expresses understanding of information and instructions, and is comfortable with plan to discharge. Pt is stable for discharge.      I discussed with patient and/or family/caretaker that evaluation in the ED does not suggest any emergent or life threatening medical conditions requiring immediate intervention beyond what was provided in the ED, and I believe patient is safe for discharge.  Regardless, an unremarkable evaluation in the ED does not preclude the development or presence of a serious of life threatening condition. As such, patient was instructed to return immediately for any worsening or change in current symptoms.    ED Medication(s):  Medications   sodium chloride 0.9% bolus 1,000 mL (0 mLs Intravenous Stopped 11/6/17 1816)   promethazine (PHENERGAN) 12.5 mg in dextrose 5 % 50 mL IVPB (0 mg Intravenous Stopped 11/6/17 1654)   morphine injection 4 mg (4 mg Intravenous Given 11/6/17 1640)       Discharge Medication List as of 11/6/2017  5:37 PM      START taking these medications    Details   diphenoxylate-atropine 2.5-0.025 mg (LOMOTIL) 2.5-0.025 mg per tablet Take 1 tablet by mouth 4 (four) times daily as needed for Diarrhea., Starting Mon 11/6/2017, Until Thu 11/16/2017, Print             Follow-up Information     Aileen Sadler MD. Call in 2 days.    Specialty:  Family Medicine  Contact information:  [address] [facility]  [place] [id number]                     Medical Decision Making    Medical Decision Making:   Clinical Tests:   Lab Tests: Ordered and Reviewed  Radiological Study: Ordered and Reviewed           Scribe Attestation:   Scribe #1: I performed the above scribed service and the documentation accurately describes  the services I performed. I attest to the accuracy of the note.    Attending:   Physician Attestation Statement for Scribe #1: I, Pantera Nino Jr., MD, personally performed the services described in this documentation, as scribed by Krysten Roman, in my presence, and it is both accurate and complete.          Clinical Impression       ICD-10-CM ICD-9-CM   1. Radiation adverse effect, initial encounter T66.XXXA 990   2. Abdominal pain, unspecified abdominal location R10.9 789.00   3. Diarrhea, unspecified type R19.7 787.91       Disposition:   Disposition: Discharged  Condition: Stable         Pantera Nino Jr., MD  11/06/17 7406

## 2017-11-07 ENCOUNTER — TELEPHONE (OUTPATIENT)
Dept: HEMATOLOGY/ONCOLOGY | Facility: CLINIC | Age: 47
End: 2017-11-07

## 2017-11-07 ENCOUNTER — DOCUMENTATION ONLY (OUTPATIENT)
Dept: RADIATION ONCOLOGY | Facility: CLINIC | Age: 47
End: 2017-11-07

## 2017-11-07 DIAGNOSIS — Z17.0 MALIGNANT NEOPLASM OF OVERLAPPING SITES OF RIGHT BREAST IN FEMALE, ESTROGEN RECEPTOR POSITIVE: ICD-10-CM

## 2017-11-07 DIAGNOSIS — G89.3 NEOPLASM RELATED PAIN: ICD-10-CM

## 2017-11-07 DIAGNOSIS — F41.9 ANXIETY: ICD-10-CM

## 2017-11-07 DIAGNOSIS — C79.51 SECONDARY CANCER OF BONE: ICD-10-CM

## 2017-11-07 DIAGNOSIS — F32.A DEPRESSION, UNSPECIFIED DEPRESSION TYPE: ICD-10-CM

## 2017-11-07 DIAGNOSIS — C50.811 MALIGNANT NEOPLASM OF OVERLAPPING SITES OF RIGHT BREAST IN FEMALE, ESTROGEN RECEPTOR POSITIVE: ICD-10-CM

## 2017-11-07 DIAGNOSIS — C77.3 MALIGNANT NEOPLASM METASTATIC TO LYMPH NODE OF AXILLA: ICD-10-CM

## 2017-11-07 RX ORDER — ALPRAZOLAM 0.5 MG/1
0.5 TABLET ORAL 2 TIMES DAILY PRN
Qty: 60 TABLET | Refills: 0 | Status: SHIPPED | OUTPATIENT
Start: 2017-11-07 | End: 2017-12-05 | Stop reason: SDUPTHER

## 2017-11-07 NOTE — TELEPHONE ENCOUNTER
----- Message from Andrade Abel sent at 11/7/2017 10:56 AM CST -----  Contact: pt  1. What is the name of the medication you are requesting? Narco & Xynex  2. What is the dose? 7.5 mg & 5 mg  3. How do you take the medication? Orally, topically, etc? Orally  4. How often do you take this medication? Every 4 hrs & every 8 hrs  5. Do you need a 30 day or 90 day supply? 30 & 90  6. How many refills are you requesting? 3  7. What is your preferred pharmacy and location of the pharmacy? [facility] on [address] in [place]  8. Who can we contact with further questions? [phone] (tode)

## 2017-11-07 NOTE — TELEPHONE ENCOUNTER
Patient advised her xanax was sent to the pharmacy 10/5/17 by Dr Delong.  [place] prescription is ready for .

## 2017-11-07 NOTE — PROGRESS NOTES
1 wk post radiation f/u:     Saw pt and  today at [facility] center while she was picking up a RX from Hem/Onc.  Stated she was feeling a lot better since radiation.  Says pain has improved.  Will f/u with Hem/Onc as scheduled.

## 2017-11-09 ENCOUNTER — LAB VISIT (OUTPATIENT)
Dept: LAB | Facility: HOSPITAL | Age: 47
End: 2017-11-09
Attending: INTERNAL MEDICINE
Payer: MEDICAID

## 2017-11-09 ENCOUNTER — SOCIAL WORK (OUTPATIENT)
Dept: HEMATOLOGY/ONCOLOGY | Facility: CLINIC | Age: 47
End: 2017-11-09

## 2017-11-09 ENCOUNTER — INFUSION (OUTPATIENT)
Dept: INFUSION THERAPY | Facility: HOSPITAL | Age: 47
End: 2017-11-09
Attending: INTERNAL MEDICINE
Payer: MEDICAID

## 2017-11-09 ENCOUNTER — OFFICE VISIT (OUTPATIENT)
Dept: HEMATOLOGY/ONCOLOGY | Facility: CLINIC | Age: 47
End: 2017-11-09
Payer: MEDICAID

## 2017-11-09 VITALS
HEIGHT: 67 IN | WEIGHT: 217.63 LBS | BODY MASS INDEX: 34.16 KG/M2 | SYSTOLIC BLOOD PRESSURE: 107 MMHG | TEMPERATURE: 98 F | DIASTOLIC BLOOD PRESSURE: 64 MMHG | HEART RATE: 69 BPM

## 2017-11-09 DIAGNOSIS — C77.3 MALIGNANT NEOPLASM METASTATIC TO LYMPH NODE OF AXILLA: ICD-10-CM

## 2017-11-09 DIAGNOSIS — C50.811 MALIGNANT NEOPLASM OF OVERLAPPING SITES OF RIGHT BREAST IN FEMALE, ESTROGEN RECEPTOR POSITIVE: Primary | ICD-10-CM

## 2017-11-09 DIAGNOSIS — C73 PAPILLARY THYROID CARCINOMA: ICD-10-CM

## 2017-11-09 DIAGNOSIS — C50.811 MALIGNANT NEOPLASM OF OVERLAPPING SITES OF RIGHT BREAST IN FEMALE, ESTROGEN RECEPTOR POSITIVE: ICD-10-CM

## 2017-11-09 DIAGNOSIS — G89.3 NEOPLASM RELATED PAIN: ICD-10-CM

## 2017-11-09 DIAGNOSIS — L73.2 HIDRADENITIS: ICD-10-CM

## 2017-11-09 DIAGNOSIS — R60.0 EDEMA EXTREMITIES: ICD-10-CM

## 2017-11-09 DIAGNOSIS — C79.51 SECONDARY CANCER OF BONE: ICD-10-CM

## 2017-11-09 DIAGNOSIS — C79.51 SECONDARY CANCER OF BONE: Primary | ICD-10-CM

## 2017-11-09 DIAGNOSIS — Z17.0 MALIGNANT NEOPLASM OF OVERLAPPING SITES OF RIGHT BREAST IN FEMALE, ESTROGEN RECEPTOR POSITIVE: Primary | ICD-10-CM

## 2017-11-09 DIAGNOSIS — F51.01 PRIMARY INSOMNIA: ICD-10-CM

## 2017-11-09 DIAGNOSIS — Z17.0 MALIGNANT NEOPLASM OF OVERLAPPING SITES OF RIGHT BREAST IN FEMALE, ESTROGEN RECEPTOR POSITIVE: ICD-10-CM

## 2017-11-09 PROBLEM — G47.00 INSOMNIA: Status: ACTIVE | Noted: 2017-11-09

## 2017-11-09 LAB
ALBUMIN SERPL BCP-MCNC: 3.7 G/DL
ALP SERPL-CCNC: 64 U/L
ALT SERPL W/O P-5'-P-CCNC: 16 U/L
ANION GAP SERPL CALC-SCNC: 10 MMOL/L
AST SERPL-CCNC: 16 U/L
BASOPHILS # BLD AUTO: 0.06 K/UL
BASOPHILS NFR BLD: 2.1 %
BILIRUB SERPL-MCNC: 0.4 MG/DL
BUN SERPL-MCNC: 8 MG/DL
CALCIUM SERPL-MCNC: 9.5 MG/DL
CHLORIDE SERPL-SCNC: 104 MMOL/L
CO2 SERPL-SCNC: 28 MMOL/L
CREAT SERPL-MCNC: 1.2 MG/DL
DIFFERENTIAL METHOD: ABNORMAL
EOSINOPHIL # BLD AUTO: 0.1 K/UL
EOSINOPHIL NFR BLD: 2.4 %
ERYTHROCYTE [DISTWIDTH] IN BLOOD BY AUTOMATED COUNT: 14.4 %
EST. GFR  (AFRICAN AMERICAN): >60 ML/MIN/1.73 M^2
EST. GFR  (NON AFRICAN AMERICAN): 53 ML/MIN/1.73 M^2
GLUCOSE SERPL-MCNC: 115 MG/DL
HCT VFR BLD AUTO: 34.7 %
HGB BLD-MCNC: 12.2 G/DL
LYMPHOCYTES # BLD AUTO: 1 K/UL
LYMPHOCYTES NFR BLD: 33.6 %
MCH RBC QN AUTO: 38.1 PG
MCHC RBC AUTO-ENTMCNC: 35.2 G/DL
MCV RBC AUTO: 108 FL
MONOCYTES # BLD AUTO: 0.3 K/UL
MONOCYTES NFR BLD: 8.7 %
NEUTROPHILS # BLD AUTO: 1.5 K/UL
NEUTROPHILS NFR BLD: 53.2 %
PLATELET # BLD AUTO: 171 K/UL
PMV BLD AUTO: 9.9 FL
POTASSIUM SERPL-SCNC: 4.1 MMOL/L
PROT SERPL-MCNC: 7.4 G/DL
RBC # BLD AUTO: 3.2 M/UL
SODIUM SERPL-SCNC: 142 MMOL/L
WBC # BLD AUTO: 2.89 K/UL

## 2017-11-09 PROCEDURE — 63600175 PHARM REV CODE 636 W HCPCS: Performed by: INTERNAL MEDICINE

## 2017-11-09 PROCEDURE — 36415 COLL VENOUS BLD VENIPUNCTURE: CPT

## 2017-11-09 PROCEDURE — 99999 PR PBB SHADOW E&M-EST. PATIENT-LVL III: CPT | Mod: PBBFAC,,, | Performed by: INTERNAL MEDICINE

## 2017-11-09 PROCEDURE — 96372 THER/PROPH/DIAG INJ SC/IM: CPT

## 2017-11-09 PROCEDURE — 85025 COMPLETE CBC W/AUTO DIFF WBC: CPT

## 2017-11-09 PROCEDURE — 99214 OFFICE O/P EST MOD 30 MIN: CPT | Mod: 25,S$PBB,, | Performed by: INTERNAL MEDICINE

## 2017-11-09 PROCEDURE — 99213 OFFICE O/P EST LOW 20 MIN: CPT | Mod: PBBFAC,25,PO | Performed by: INTERNAL MEDICINE

## 2017-11-09 PROCEDURE — 80053 COMPREHEN METABOLIC PANEL: CPT

## 2017-11-09 RX ORDER — POTASSIUM CHLORIDE 20 MEQ/1
20 TABLET, EXTENDED RELEASE ORAL DAILY
Qty: 30 TABLET | Refills: 1 | Status: SHIPPED | OUTPATIENT
Start: 2017-11-09 | End: 2018-11-09

## 2017-11-09 RX ORDER — FUROSEMIDE 20 MG/1
20 TABLET ORAL DAILY
Qty: 30 TABLET | Refills: 1 | Status: SHIPPED | OUTPATIENT
Start: 2017-11-09 | End: 2019-02-21

## 2017-11-09 RX ORDER — TEMAZEPAM 15 MG/1
15 CAPSULE ORAL NIGHTLY PRN
Qty: 30 CAPSULE | Refills: 1 | Status: SHIPPED | OUTPATIENT
Start: 2017-11-09 | End: 2017-12-09

## 2017-11-09 RX ADMIN — DENOSUMAB 120 MG: 120 INJECTION SUBCUTANEOUS at 03:11

## 2017-11-09 NOTE — PROGRESS NOTES
"Met with pt briefly in exam room prior to her seeing the oncologist to f/u about depressive symptoms. Pt reported she "feeling much better" since SW saw her last in radiation. 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 Pt reported sleeping is still her main concern and she is waiting for Dr. Delong to return in order to discuss possible medications to help with this issue. Pt reported no suicidal ideations recently and feels her depression is improving. SW will cont to f/u with pt when she is in clinic.   "

## 2017-11-09 NOTE — PROGRESS NOTES
Subjective:       Patient ID: Josey Flores is a 47 y.o. female.    Chief Complaint: No chief complaint on file.    HPI The patient is a 46-year-old  female who presents to the hematology oncology clinic today for follow up for metastatic ER/NH positive, her-2 negative right breast carcinoma.    She is currently on Ibrance/femara and Xgeva.  Says she has insomnia. She takes xanx for anxiety bd but the night dose is not enough.  She ha pain in the knees. She is on Norco for pain.    ibrance and letrozole. Ibrance was started on 2/21/17.     PAST MEDICAL HISTORY:   1. HSIL on pap smear s/p LEEP     SURGICAL HISTORY:   1.  Tonsillectomy and adenoidectomy  2.  Appendectomy  3.  Cholecystectomy  4.  D&C  5.  Thyroidectomy on August 31, 2017     FAMILY HISTORY: She reports that her maternal aunt had lung cancer in her 70s.  She used to smoke cigarettes.  Her maternal cousin had lung cancer in her 60s.  She used to smoke cigarettes.  She denies any other immediate family members with cancer or bleeding/clotting disorders.     SOCIAL HISTORY: She reports a 45+ pack year smoking history. She quit in May 2017. She drinks wine occasionally.  She denies any history of recreational drug use.  She is engaged and has 2 sons.  She used to work for [employer].  She lives in [place], [place].      ALLERGIES: [NKDA]     MEDICATIONS: [Medcard has been reviewed and/or reconciled.]  Review of Systems   Constitutional: Negative.    HENT: Negative.    Eyes: Negative.    Respiratory: Negative.  Negative for cough and wheezing.    Cardiovascular: Negative.  Negative for chest pain.   Gastrointestinal: Negative.    Genitourinary: Negative.    Neurological: Negative.    Psychiatric/Behavioral: Negative.        Objective:      Physical Exam   Constitutional: She is oriented to person, place, and time. She appears well-developed. No distress.   HENT:   Head: Normocephalic.   Right Ear: Tympanic membrane, external ear and  ear canal normal.   Left Ear: Tympanic membrane, external ear and ear canal normal.   Nose: Nose normal. Right sinus exhibits no maxillary sinus tenderness and no frontal sinus tenderness. Left sinus exhibits no maxillary sinus tenderness and no frontal sinus tenderness.   Mouth/Throat: Oropharynx is clear and moist and mucous membranes are normal.   Teeth normal.  Gums normal.   Eyes: Conjunctivae and lids are normal. Pupils are equal, round, and reactive to light.   Neck: Normal carotid pulses, no hepatojugular reflux and no JVD present. Carotid bruit is not present. No tracheal deviation present. No thyroid mass and no thyromegaly present.   Cardiovascular: Normal rate, regular rhythm, S1 normal, S2 normal, normal heart sounds and intact distal pulses.  Exam reveals no gallop and no friction rub.    No murmur heard.  Carotid exam normal   Pulmonary/Chest: Effort normal and breath sounds normal. No accessory muscle usage. No respiratory distress. She has no wheezes. She has no rales. She exhibits no tenderness.   Abdominal: Soft. Normal appearance. She exhibits no distension and no mass. There is no splenomegaly or hepatomegaly. There is no tenderness. There is no rebound and no guarding.   Musculoskeletal: Normal range of motion. She exhibits no edema or tenderness.        Right hand: Normal.        Left hand: Normal.       Lymphadenopathy:     She has no cervical adenopathy.     She has no axillary adenopathy.        Right: No inguinal and no supraclavicular adenopathy present.        Left: No inguinal and no supraclavicular adenopathy present.   Neurological: She is alert and oriented to person, place, and time. She has normal strength. No cranial nerve deficit. Coordination normal.   Skin: Skin is warm and dry. No rash noted. She is not diaphoretic. No cyanosis or erythema. No pallor. Nails show no clubbing.   Psychiatric: She has a normal mood and affect. Her behavior is normal. Judgment and thought content  normal.       Wt Readings from Last 3 Encounters:   11/09/17 98.7 kg (217 lb 9.5 oz)   11/06/17 95.3 kg (210 lb)   11/01/17 97.2 kg (214 lb 4.6 oz)     Temp Readings from Last 3 Encounters:   11/09/17 97.8 °F (36.6 °C) (Oral)   11/06/17 98.2 °F (36.8 °C) (Oral)   11/01/17 97.4 °F (36.3 °C) (Tympanic)     BP Readings from Last 3 Encounters:   11/09/17 107/64   11/06/17 122/71   11/01/17 104/76     Pulse Readings from Last 3 Encounters:   11/09/17 69   11/06/17 69   11/01/17 86       Assessment:       1. Malignant neoplasm of overlapping sites of right breast in female, estrogen receptor positive    2. Papillary thyroid carcinoma    3. Secondary cancer of bone    4. Malignant neoplasm metastatic to lymph node of axilla    5. Hidradenitis    6. Edema extremities    7. Primary insomnia        Plan:       Lab Results   Component Value Date    WBC 2.89 (L) 11/09/2017    HGB 12.2 11/09/2017    HCT 34.7 (L) 11/09/2017     (H) 11/09/2017     11/09/2017     Lab Results   Component Value Date    CREATININE 1.2 11/09/2017     Lab Results   Component Value Date    ALT 16 11/09/2017    AST 16 11/09/2017    ALKPHOS 64 11/09/2017    BILITOT 0.4 11/09/2017     Lab Results   Component Value Date    CALCIUM 9.5 11/09/2017    PHOS 3.4 05/17/2017       White cell count minimally decreased. Continue Ibrance and Letrozole.  Xgeva   today.  Prescribe Temazepam for insomnia. Asked not to take the night dose of Alprazolam. Patient repeated instructions back to me.  See DR garcia in 4 weeks with a cbc and a cmp

## 2017-11-09 NOTE — PATIENT INSTRUCTIONS
[facility]  [address] [facility] [address]  [address] [address]  [phone] phone     [phone] fax  Hours of Operation: Monday- Friday 8:00am- 5:00pm  After hours phone  [phone]  Hematology / Oncology Physicians on call      Dr. Jake Keith                        Please call with any concerns regarding your appointment today.  Remember to take your calcium with vitamin D supplement as directed.   Do not have any dental work for 3 months following your injection today.

## 2017-11-20 DIAGNOSIS — C50.811 MALIGNANT NEOPLASM OF OVERLAPPING SITES OF RIGHT BREAST IN FEMALE, ESTROGEN RECEPTOR POSITIVE: Primary | ICD-10-CM

## 2017-11-20 DIAGNOSIS — G89.3 NEOPLASM RELATED PAIN: ICD-10-CM

## 2017-11-20 DIAGNOSIS — C77.3 MALIGNANT NEOPLASM METASTATIC TO LYMPH NODE OF AXILLA: ICD-10-CM

## 2017-11-20 DIAGNOSIS — C50.811 MALIGNANT NEOPLASM OF OVERLAPPING SITES OF RIGHT FEMALE BREAST: ICD-10-CM

## 2017-11-20 DIAGNOSIS — Z17.0 MALIGNANT NEOPLASM OF OVERLAPPING SITES OF RIGHT BREAST IN FEMALE, ESTROGEN RECEPTOR POSITIVE: Primary | ICD-10-CM

## 2017-11-20 DIAGNOSIS — C79.51 SECONDARY CANCER OF BONE: ICD-10-CM

## 2017-11-20 RX ORDER — HYDROCODONE BITARTRATE AND ACETAMINOPHEN 7.5; 325 MG/1; MG/1
1 TABLET ORAL EVERY 6 HOURS PRN
Qty: 60 TABLET | Refills: 0 | Status: SHIPPED | OUTPATIENT
Start: 2017-11-20 | End: 2017-12-05 | Stop reason: SDUPTHER

## 2017-11-20 RX ORDER — PALBOCICLIB 125 MG/1
CAPSULE ORAL
Qty: 21 CAPSULE | Refills: 2 | Status: SHIPPED | OUTPATIENT
Start: 2017-11-20 | End: 2018-02-13 | Stop reason: SDUPTHER

## 2017-11-29 ENCOUNTER — LAB VISIT (OUTPATIENT)
Dept: LAB | Facility: HOSPITAL | Age: 47
End: 2017-11-29
Attending: INTERNAL MEDICINE
Payer: MEDICAID

## 2017-11-29 DIAGNOSIS — E89.0 POSTOPERATIVE HYPOTHYROIDISM: ICD-10-CM

## 2017-11-29 DIAGNOSIS — Z85.850 HX OF THYROID CANCER: ICD-10-CM

## 2017-11-29 DIAGNOSIS — E83.51 HYPOCALCEMIA: ICD-10-CM

## 2017-11-29 LAB
25(OH)D3+25(OH)D2 SERPL-MCNC: 38 NG/ML
ALBUMIN SERPL BCP-MCNC: 3.8 G/DL
ALP SERPL-CCNC: 71 U/L
ANION GAP SERPL CALC-SCNC: 8 MMOL/L
BUN SERPL-MCNC: 12 MG/DL
CALCIUM SERPL-MCNC: 9.9 MG/DL
CHLORIDE SERPL-SCNC: 103 MMOL/L
CO2 SERPL-SCNC: 29 MMOL/L
CREAT SERPL-MCNC: 1.3 MG/DL
EST. GFR  (AFRICAN AMERICAN): 55.7 ML/MIN/1.73 M^2
EST. GFR  (NON AFRICAN AMERICAN): 48.3 ML/MIN/1.73 M^2
GLUCOSE SERPL-MCNC: 100 MG/DL
PHOSPHATE SERPL-MCNC: 3.2 MG/DL
POTASSIUM SERPL-SCNC: 4.4 MMOL/L
PTH-INTACT SERPL-MCNC: 41 PG/ML
SODIUM SERPL-SCNC: 140 MMOL/L
T3FREE SERPL-MCNC: 2.9 PG/ML
T4 FREE SERPL-MCNC: 1.35 NG/DL
TSH SERPL DL<=0.005 MIU/L-ACNC: 2.2 UIU/ML

## 2017-11-29 PROCEDURE — 84100 ASSAY OF PHOSPHORUS: CPT

## 2017-11-29 PROCEDURE — 84481 FREE ASSAY (FT-3): CPT

## 2017-11-29 PROCEDURE — 86800 THYROGLOBULIN ANTIBODY: CPT

## 2017-11-29 PROCEDURE — 82306 VITAMIN D 25 HYDROXY: CPT

## 2017-11-29 PROCEDURE — 83970 ASSAY OF PARATHORMONE: CPT

## 2017-11-29 PROCEDURE — 84439 ASSAY OF FREE THYROXINE: CPT

## 2017-11-29 PROCEDURE — 84443 ASSAY THYROID STIM HORMONE: CPT

## 2017-11-29 PROCEDURE — 82040 ASSAY OF SERUM ALBUMIN: CPT

## 2017-11-29 PROCEDURE — 80048 BASIC METABOLIC PNL TOTAL CA: CPT

## 2017-11-29 PROCEDURE — 84075 ASSAY ALKALINE PHOSPHATASE: CPT

## 2017-11-30 LAB
THRYOGLOBULIN INTERPRETATION: ABNORMAL
THYROGLOB AB SERPL-ACNC: <1.8 IU/ML
THYROGLOB SERPL-MCNC: 0.5 NG/ML

## 2017-12-05 ENCOUNTER — TELEPHONE (OUTPATIENT)
Dept: HEMATOLOGY/ONCOLOGY | Facility: CLINIC | Age: 47
End: 2017-12-05

## 2017-12-05 DIAGNOSIS — C77.3 MALIGNANT NEOPLASM METASTATIC TO LYMPH NODE OF AXILLA: ICD-10-CM

## 2017-12-05 DIAGNOSIS — Z17.0 MALIGNANT NEOPLASM OF OVERLAPPING SITES OF RIGHT BREAST IN FEMALE, ESTROGEN RECEPTOR POSITIVE: ICD-10-CM

## 2017-12-05 DIAGNOSIS — G89.3 NEOPLASM RELATED PAIN: ICD-10-CM

## 2017-12-05 DIAGNOSIS — C50.811 MALIGNANT NEOPLASM OF OVERLAPPING SITES OF RIGHT BREAST IN FEMALE, ESTROGEN RECEPTOR POSITIVE: ICD-10-CM

## 2017-12-05 DIAGNOSIS — C79.51 SECONDARY CANCER OF BONE: ICD-10-CM

## 2017-12-05 DIAGNOSIS — F41.9 ANXIETY: ICD-10-CM

## 2017-12-05 RX ORDER — ALPRAZOLAM 0.5 MG/1
0.5 TABLET ORAL 2 TIMES DAILY PRN
Qty: 60 TABLET | Refills: 0 | Status: SHIPPED | OUTPATIENT
Start: 2017-12-05 | End: 2018-01-05 | Stop reason: SDUPTHER

## 2017-12-05 RX ORDER — HYDROCODONE BITARTRATE AND ACETAMINOPHEN 7.5; 325 MG/1; MG/1
1 TABLET ORAL EVERY 6 HOURS PRN
Qty: 60 TABLET | Refills: 0 | Status: SHIPPED | OUTPATIENT
Start: 2017-12-05 | End: 2017-12-20 | Stop reason: SDUPTHER

## 2017-12-05 NOTE — TELEPHONE ENCOUNTER
----- Message from Richard Delong MD sent at 12/5/2017 10:09 AM CST -----  Contact: PT   done  ----- Message -----  From: Juliet Gale MA  Sent: 12/5/2017   9:46 AM  To: Richard Delong MD        ----- Message -----  From: Yumiko Brito  Sent: 12/5/2017   9:28 AM  To: Baljeet Ramos Staff    ..1. What is the name of the medication you are requesting?ALTRAZOLAM  2. What is the dose? 5mg  3. How do you take the medication? Orally, topically, etc? Orally   4. How often do you take this medication? Twice a day   5. Do you need a 30 day or 90 day supply? 60 day   6. How many refills are you requesting? 1  7. What is your preferred pharmacy and location of the pharmacy? .  [place] [address]  Phone: [phone] Fax: [phone]    8. Who can we contact with further questions? PT      .1. What is the name of the medication you are requesting? Narcod  2. What is the dose? 7.5mg   3. How do you take the medication? Orally, topically, etc? Orally   4. How often do you take this medication? Every 4 hours  5. Do you need a 30 day or 90 day supply?30 day   6. How many refills are you requesting? 1  7. What is your preferred pharmacy and location of the pharmacy? .  [address] [address]  Phone: [phone] Fax: [phone]        8. Who can we contact with further questions? PT

## 2017-12-06 ENCOUNTER — TELEPHONE (OUTPATIENT)
Dept: ENDOCRINOLOGY | Facility: CLINIC | Age: 47
End: 2017-12-06

## 2017-12-06 ENCOUNTER — TELEPHONE (OUTPATIENT)
Dept: HEMATOLOGY/ONCOLOGY | Facility: CLINIC | Age: 47
End: 2017-12-06

## 2017-12-06 NOTE — TELEPHONE ENCOUNTER
Pt advised the following, per MD orders:thyroid labs have normalized.  Continue current dose of thyroid hormone for now.  The rest of her labs are stable. Pt verbalized understanding.

## 2017-12-06 NOTE — TELEPHONE ENCOUNTER
----- Message from Janee Velazquez sent at 12/6/2017 12:32 PM CST -----  Contact: patient  Calling concerning rescheduling her appointments to another day. Please call patient @ [phone]. Thanks, Emily

## 2017-12-13 ENCOUNTER — INFUSION (OUTPATIENT)
Dept: INFUSION THERAPY | Facility: HOSPITAL | Age: 47
End: 2017-12-13
Attending: INTERNAL MEDICINE
Payer: MEDICAID

## 2017-12-13 ENCOUNTER — OFFICE VISIT (OUTPATIENT)
Dept: HEMATOLOGY/ONCOLOGY | Facility: CLINIC | Age: 47
End: 2017-12-13
Payer: MEDICAID

## 2017-12-13 VITALS
RESPIRATION RATE: 18 BRPM | HEIGHT: 67 IN | DIASTOLIC BLOOD PRESSURE: 73 MMHG | WEIGHT: 218.06 LBS | OXYGEN SATURATION: 95 % | SYSTOLIC BLOOD PRESSURE: 130 MMHG | HEART RATE: 89 BPM | BODY MASS INDEX: 34.22 KG/M2 | TEMPERATURE: 98 F

## 2017-12-13 DIAGNOSIS — G89.3 NEOPLASM RELATED PAIN: ICD-10-CM

## 2017-12-13 DIAGNOSIS — Z17.0 MALIGNANT NEOPLASM OF OVERLAPPING SITES OF RIGHT BREAST IN FEMALE, ESTROGEN RECEPTOR POSITIVE: Primary | ICD-10-CM

## 2017-12-13 DIAGNOSIS — C79.51 SECONDARY CANCER OF BONE: Primary | ICD-10-CM

## 2017-12-13 DIAGNOSIS — C50.811 MALIGNANT NEOPLASM OF OVERLAPPING SITES OF RIGHT BREAST IN FEMALE, ESTROGEN RECEPTOR POSITIVE: ICD-10-CM

## 2017-12-13 DIAGNOSIS — C50.811 MALIGNANT NEOPLASM OF OVERLAPPING SITES OF RIGHT BREAST IN FEMALE, ESTROGEN RECEPTOR POSITIVE: Primary | ICD-10-CM

## 2017-12-13 DIAGNOSIS — C77.3 MALIGNANT NEOPLASM METASTATIC TO LYMPH NODE OF AXILLA: ICD-10-CM

## 2017-12-13 DIAGNOSIS — Z17.0 MALIGNANT NEOPLASM OF OVERLAPPING SITES OF RIGHT BREAST IN FEMALE, ESTROGEN RECEPTOR POSITIVE: ICD-10-CM

## 2017-12-13 DIAGNOSIS — C79.51 SECONDARY CANCER OF BONE: ICD-10-CM

## 2017-12-13 PROCEDURE — 99999 PR PBB SHADOW E&M-EST. PATIENT-LVL IV: CPT | Mod: PBBFAC,,, | Performed by: INTERNAL MEDICINE

## 2017-12-13 PROCEDURE — 63600175 PHARM REV CODE 636 W HCPCS: Mod: PO | Performed by: INTERNAL MEDICINE

## 2017-12-13 PROCEDURE — 96372 THER/PROPH/DIAG INJ SC/IM: CPT | Mod: PO

## 2017-12-13 PROCEDURE — 99214 OFFICE O/P EST MOD 30 MIN: CPT | Mod: 25,S$PBB,, | Performed by: INTERNAL MEDICINE

## 2017-12-13 PROCEDURE — 99214 OFFICE O/P EST MOD 30 MIN: CPT | Mod: PBBFAC,PO,25 | Performed by: INTERNAL MEDICINE

## 2017-12-13 RX ADMIN — DENOSUMAB 120 MG: 120 INJECTION SUBCUTANEOUS at 04:12

## 2017-12-13 NOTE — PROGRESS NOTES
Reason for visit: Right breast carcinoma  Date of Diagnosis: January 12, 2017    HPI:   The patient is a 47-year-old  female who presents to the hematology oncology clinic today for follow up for metastatic right breast carcinoma.    I have reviewed all of the patient's relevant clinical history available in the medical record and have utilized this in my evaluation and management recommendations today.  The patient had 2 separate areas in the right breast that were biopsied area the first was a right breast mass at 11:00 which was 3 cm from the nipple which showed invasive mammary carcinoma.  The invasive carcinoma measured at least 1.2 cm in greatest dimension and appeared high-grade.  This tumor was ER positive/UT positive/HER-2 negative.  The second breast mass was biopsied from the retroperitoneal area lower area and was also positive for invasive mammary carcinoma.  This measured at least 0.4 cm and also appeared high-grade.  The tumor is morphologically very similar to the tumor in the prior specimen.  Receptor studies were not done on this specimen.  The patient also had a palpable right axillary lymph node which was biopsied and showed metastatic mammary carcinoma which measured at least 1.3 cm in greatest dimension.  The patient had self palpated this breast mass.  In addition the patient has a palpable mass in the region of her right elbow which she reports has been present for 9 years but had been slowly growing especially over the past year or 2.  She denies any pain associated with this.    Most recently she is status post total thyroidectomy for papillary thyroid carcinoma.  Today the patient reports that she continues to have lower back pain.  She has been having worsening problems with anxiety and depression.  However she denies any suicidal or homicidal ideation.  She reports chronic fatigue. She is taking norco PRN neoplasm related pain.  She denies any fevers or chills. Denies night  sweats.  She denies any melena, hematochezia, hematemesis, hemoptysis or hematuria. She denies nausea. Denies vomiting or abdominal pain.  She denies cough.  She continues on ibrance and letrozole. Ibrance was started on 2/21/17.    PAST MEDICAL HISTORY:   1. HSIL on pap smear s/p LEEP    SURGICAL HISTORY:   1.  Tonsillectomy and adenoidectomy  2.  Appendectomy  3.  Cholecystectomy  4.  D&C  5.  Thyroidectomy on August 31, 2017    FAMILY HISTORY: She reports that her maternal aunt had lung cancer in her 70s.  She used to smoke cigarettes.  Her maternal cousin had lung cancer in her 60s.  She used to smoke cigarettes.  She denies any other immediate family members with cancer or bleeding/clotting disorders.    SOCIAL HISTORY: She reports a 45+ pack year smoking history. She quit in May 2017. She drinks wine occasionally.  She denies any history of recreational drug use.  She is engaged and has 2 sons.  She used to work for the [employer].  She lives in [place], [place].     ALLERGIES: [NKDA]    MEDICATIONS: [Medcard has been reviewed and/or reconciled.]    REVIEW OF SYSTEMS:   GENERAL: [No fevers, chills or sweats. Reports fatigue. Reports weight gain. Denies loss of appetite.]  HEENT: [No blurred vision, tinnitus, nasal discharge, sorethroat or dysphagia.]  HEART: [No chest pain, palpitations or shortness of breath.]    LUNGS: [Reports chronic cough. Denies hemoptysis or breathing problems.]  ABDOMEN: [No abdominal pain, nausea, vomiting, diarrhea, constipation or melena.]  GENITOURINARY: [No dysuria, bleeding or malodorous discharge.]  NEURO: [No headache, dizziness or vertigo.]  HEMATOLOGY: [No easy bruising, spontaneous bleeding or blood clots in the past].  MUSCULOSKELETAL: [Reports arthralgias, myalgias and bone pains.]  SKIN: [No rashes or skin lesions.]  PSYCHIATRY: [Reports depression. Reports anxiety.]  Denies suicidal/homicidal ideation.    PHYSICAL EXAMINATION:   VS: Reviewed on nurse's  notes.  APPEARANCE: The patient is a well-developed, well-nourished and well-groomed  female who appears in no acute distress. She was accompanied to this clinic visit by her fiance.  HEENT: No scleral icterus. Both external auditory canals clear. No oral ulcers, lesions. Throat clear  HEAD: No sinus tenderness.  NECK: Supple. No palpable lymphadenopathy. Thyroid non-tender, no palpable masses  CHEST: Breath sounds clear bilaterally. No rales. No rhonchi. Unlabored respirations.  BREAST: Deferred today.  CARDIOVASCULAR: Normal S1, S2. Normal rate. Regular rhythm.  ABDOMEN: Bowel sounds normal. No tenderness. No abdominal distention. No hepatomegaly. No splenomegaly.  BACK: Palpable tenderness in the lower back.  LYMPHATIC: No palpable supraclavicular lymphadenopathy. Bilateral axillary healed wounds noted.  EXTREMITIES: No clubbing, cyanosis. Palpable firm 3 cm mass in the right elbow is noted. Trace edema in bilateral lower extremities.      SKIN: No lesions. No petechiae. No ecchymoses.   NEUROLOGIC: No focal findings. Alert & Oriented x 3. Mood appropriate to affect    LABS:   Reviewed    IMAGING:  Reviewed    IMPRESSION:  1.  Metastatic multifocal infiltrating ductal carcinoma of the right breast with right axillary and bone involvement [ER positive/OR positive/HER-2 negative]  2.  Papillary thyroid carcinoma  3.  Tobacco abuse  4.  Right elbow mass  5.  Anxiety and depression  6.  Right axillary hydradenitis/cellulitis and left inguinal hydradenitis/cellulitis s/p debridement and skin grafting    PLAN:  1.  I had a detailed discussion with the patient previously with regard to the diagnosis, prognosis and treatment options for metastatic multifocal invasive ductal carcinoma of the right breast with right axillary and bone involvement.  2.  I have discussed that at this time her metastatic malignancy is potentially treatable but not curable.  3.  Results of PET/CT scan from 7/12/17 were discussed in  detail with the patient previously. Findings consistent with treatment response with no FDG avid disease identified with essentially complete resolution of the previously noted right axillary adenopathy and right breast mass.  No FDG avidity associated with osseous lesions. She had persistent significantly FDG avid right thyroid lesion.   4.  The patient was strongly encouraged to quit smoking.  She expressed understanding.  Prescription for Xanax when necessary anxiety was previously given to the patient after full discussion of sedation precautions.    5. FSH and estradiol levels confirm menopausal status. TSH lab was normal.   6.  MRI of the brain on 1/31/17 to evaluate for any evidence of metastatic disease to complete staging workup was negative for metastatic malignancy to the brain. CT head done in March 2017 in ED was also ok.  7. Continue to proceed with ibrance/letrozole.  She appears to be tolerating this well so far with no significant side effects.   8. She has been advised to take calcium and vit d supplementation everyday. She is on xgeva injections for treatment of bone involvement by malignancy. Risks/benefits and common side effects discussed and she expressed understanding and agreement. She has dentures.  She will proceed with her next scheduled dose of Xgeva on October 11, 2017.  9. Continue Lasix PRN for treatment of bilateral lower extremity edema.  Supplemental potassium was given as well.  Advised to use compression stockings and elevate legs.  10.  She will continue follow up with with Dr. Jason with endocrinology for follow up of papillary thyroid carcinoma.    11.  Results of MRI of the thoracic and lumbar spine from 9/21/17 for evaluation of current acute symptoms related to back pain reviewed in detail with pateint. No acute process involving the spinal cord is noted. She does have bone involvement by malignancy in multiple areas. However the PET/CT done on 9/27/17 shows no evidence of  new areas of bony or visceral involvement.  Hence there appears to be no evidence of metastatic disease progression.  However since the patient continues to have low back pain and MRI of lumbar spine does show enhancing marrow replacing metastatic lesions are seen in the bilateral iliac bones posteriorly. She did see radiation oncology and completed palliative XRT to this location which helped significantly with her symptoms.    12.  The patient will continue follow up with Dr. Guerin with clinical psychology for her chronic anxiety and depression.  The patient has been advised to seek immediate medical attention if any suicidal/homicidal ideation.  She expressed understanding.    Return to clinic in 4 weeks with labs, PET/CT to discuss further management and for her next dose of Xgeva.  She knows to call sooner for any new problems or questions.    Richard Delong MD

## 2017-12-20 DIAGNOSIS — C79.51 SECONDARY CANCER OF BONE: ICD-10-CM

## 2017-12-20 DIAGNOSIS — G89.3 NEOPLASM RELATED PAIN: ICD-10-CM

## 2017-12-20 DIAGNOSIS — Z17.0 MALIGNANT NEOPLASM OF OVERLAPPING SITES OF RIGHT BREAST IN FEMALE, ESTROGEN RECEPTOR POSITIVE: ICD-10-CM

## 2017-12-20 DIAGNOSIS — C77.3 MALIGNANT NEOPLASM METASTATIC TO LYMPH NODE OF AXILLA: ICD-10-CM

## 2017-12-20 DIAGNOSIS — C50.811 MALIGNANT NEOPLASM OF OVERLAPPING SITES OF RIGHT BREAST IN FEMALE, ESTROGEN RECEPTOR POSITIVE: ICD-10-CM

## 2017-12-20 RX ORDER — HYDROCODONE BITARTRATE AND ACETAMINOPHEN 7.5; 325 MG/1; MG/1
1 TABLET ORAL EVERY 6 HOURS PRN
Qty: 60 TABLET | Refills: 0 | Status: SHIPPED | OUTPATIENT
Start: 2017-12-20 | End: 2018-01-05 | Stop reason: SDUPTHER

## 2018-01-04 ENCOUNTER — TELEPHONE (OUTPATIENT)
Dept: HEMATOLOGY/ONCOLOGY | Facility: CLINIC | Age: 48
End: 2018-01-04

## 2018-01-04 DIAGNOSIS — Z17.0 MALIGNANT NEOPLASM OF OVERLAPPING SITES OF RIGHT BREAST IN FEMALE, ESTROGEN RECEPTOR POSITIVE: Primary | ICD-10-CM

## 2018-01-04 DIAGNOSIS — C77.3 MALIGNANT NEOPLASM METASTATIC TO LYMPH NODE OF AXILLA: ICD-10-CM

## 2018-01-04 DIAGNOSIS — C79.51 SECONDARY CANCER OF BONE: ICD-10-CM

## 2018-01-04 DIAGNOSIS — C50.811 MALIGNANT NEOPLASM OF OVERLAPPING SITES OF RIGHT BREAST IN FEMALE, ESTROGEN RECEPTOR POSITIVE: Primary | ICD-10-CM

## 2018-01-04 NOTE — TELEPHONE ENCOUNTER
----- Message from Richard Delong MD sent at 1/4/2018  1:33 PM CST -----  PET CT scan has been denied for this patient by her insurance company.  Please cancel PET/CT scan and schedule the patient for CT scans of the thorax/abdomen/pelvis with contrast on Silvio 10, 2017.  Orders are in.  Thank you.

## 2018-01-05 DIAGNOSIS — C77.3 MALIGNANT NEOPLASM METASTATIC TO LYMPH NODE OF AXILLA: ICD-10-CM

## 2018-01-05 DIAGNOSIS — C50.811 MALIGNANT NEOPLASM OF OVERLAPPING SITES OF RIGHT BREAST IN FEMALE, ESTROGEN RECEPTOR POSITIVE: ICD-10-CM

## 2018-01-05 DIAGNOSIS — Z17.0 MALIGNANT NEOPLASM OF OVERLAPPING SITES OF RIGHT BREAST IN FEMALE, ESTROGEN RECEPTOR POSITIVE: ICD-10-CM

## 2018-01-05 DIAGNOSIS — F41.9 ANXIETY: ICD-10-CM

## 2018-01-05 DIAGNOSIS — C79.51 SECONDARY CANCER OF BONE: ICD-10-CM

## 2018-01-05 DIAGNOSIS — G89.3 NEOPLASM RELATED PAIN: ICD-10-CM

## 2018-01-05 RX ORDER — ALPRAZOLAM 0.5 MG/1
0.5 TABLET ORAL 2 TIMES DAILY PRN
Qty: 60 TABLET | Refills: 0 | Status: SHIPPED | OUTPATIENT
Start: 2018-01-05 | End: 2018-02-05 | Stop reason: SDUPTHER

## 2018-01-05 RX ORDER — HYDROCODONE BITARTRATE AND ACETAMINOPHEN 7.5; 325 MG/1; MG/1
1 TABLET ORAL EVERY 6 HOURS PRN
Qty: 60 TABLET | Refills: 0 | Status: SHIPPED | OUTPATIENT
Start: 2018-01-05 | End: 2018-01-19 | Stop reason: SDUPTHER

## 2018-01-10 ENCOUNTER — HOSPITAL ENCOUNTER (OUTPATIENT)
Dept: RADIOLOGY | Facility: HOSPITAL | Age: 48
Discharge: HOME | End: 2018-01-10
Attending: INTERNAL MEDICINE
Payer: MEDICAID

## 2018-01-10 ENCOUNTER — OFFICE VISIT (OUTPATIENT)
Dept: HEMATOLOGY/ONCOLOGY | Facility: CLINIC | Age: 48
End: 2018-01-10
Payer: MEDICAID

## 2018-01-10 ENCOUNTER — INFUSION (OUTPATIENT)
Dept: INFUSION THERAPY | Facility: HOSPITAL | Age: 48
End: 2018-01-10
Attending: INTERNAL MEDICINE
Payer: MEDICAID

## 2018-01-10 VITALS
OXYGEN SATURATION: 97 % | HEART RATE: 87 BPM | BODY MASS INDEX: 33.78 KG/M2 | TEMPERATURE: 98 F | SYSTOLIC BLOOD PRESSURE: 111 MMHG | HEIGHT: 67 IN | DIASTOLIC BLOOD PRESSURE: 70 MMHG | WEIGHT: 215.19 LBS

## 2018-01-10 DIAGNOSIS — Z17.0 MALIGNANT NEOPLASM OF OVERLAPPING SITES OF RIGHT BREAST IN FEMALE, ESTROGEN RECEPTOR POSITIVE: ICD-10-CM

## 2018-01-10 DIAGNOSIS — C50.811 MALIGNANT NEOPLASM OF OVERLAPPING SITES OF RIGHT BREAST IN FEMALE, ESTROGEN RECEPTOR POSITIVE: ICD-10-CM

## 2018-01-10 DIAGNOSIS — C79.51 SECONDARY CANCER OF BONE: Primary | ICD-10-CM

## 2018-01-10 DIAGNOSIS — C50.811 MALIGNANT NEOPLASM OF OVERLAPPING SITES OF RIGHT BREAST IN FEMALE, ESTROGEN RECEPTOR POSITIVE: Primary | ICD-10-CM

## 2018-01-10 DIAGNOSIS — C79.51 SECONDARY CANCER OF BONE: ICD-10-CM

## 2018-01-10 DIAGNOSIS — C77.3 MALIGNANT NEOPLASM METASTATIC TO LYMPH NODE OF AXILLA: ICD-10-CM

## 2018-01-10 DIAGNOSIS — Z17.0 MALIGNANT NEOPLASM OF OVERLAPPING SITES OF RIGHT BREAST IN FEMALE, ESTROGEN RECEPTOR POSITIVE: Primary | ICD-10-CM

## 2018-01-10 DIAGNOSIS — G89.3 NEOPLASM RELATED PAIN: ICD-10-CM

## 2018-01-10 DIAGNOSIS — F17.200 SMOKING: ICD-10-CM

## 2018-01-10 PROCEDURE — 99214 OFFICE O/P EST MOD 30 MIN: CPT | Mod: PBBFAC,25 | Performed by: INTERNAL MEDICINE

## 2018-01-10 PROCEDURE — 25500020 PHARM REV CODE 255: Performed by: INTERNAL MEDICINE

## 2018-01-10 PROCEDURE — 96372 THER/PROPH/DIAG INJ SC/IM: CPT

## 2018-01-10 PROCEDURE — 99215 OFFICE O/P EST HI 40 MIN: CPT | Mod: 25,S$PBB,, | Performed by: INTERNAL MEDICINE

## 2018-01-10 PROCEDURE — 99999 PR PBB SHADOW E&M-EST. PATIENT-LVL IV: CPT | Mod: PBBFAC,,, | Performed by: INTERNAL MEDICINE

## 2018-01-10 PROCEDURE — 63600175 PHARM REV CODE 636 W HCPCS: Mod: JG | Performed by: INTERNAL MEDICINE

## 2018-01-10 RX ADMIN — IOHEXOL 30 ML: 350 INJECTION, SOLUTION INTRAVENOUS at 11:01

## 2018-01-10 RX ADMIN — DENOSUMAB 120 MG: 120 INJECTION SUBCUTANEOUS at 11:01

## 2018-01-10 NOTE — PROGRESS NOTES
Reason for visit: Right breast carcinoma  Date of Diagnosis: January 12, 2017    HPI:   The patient is a 47-year-old  female who presents to the hematology oncology clinic today for follow up for metastatic right breast carcinoma.    I have reviewed all of the patient's relevant clinical history available in the medical record and have utilized this in my evaluation and management recommendations today.  The patient had 2 separate areas in the right breast that were biopsied area the first was a right breast mass at 11:00 which was 3 cm from the nipple which showed invasive mammary carcinoma.  The invasive carcinoma measured at least 1.2 cm in greatest dimension and appeared high-grade.  This tumor was ER positive/MA positive/HER-2 negative.  The second breast mass was biopsied from the retroperitoneal area lower area and was also positive for invasive mammary carcinoma.  This measured at least 0.4 cm and also appeared high-grade.  The tumor is morphologically very similar to the tumor in the prior specimen.  Receptor studies were not done on this specimen.  The patient also had a palpable right axillary lymph node which was biopsied and showed metastatic mammary carcinoma which measured at least 1.3 cm in greatest dimension.  The patient had self palpated this breast mass.  In addition the patient has a palpable mass in the region of her right elbow which she reports has been present for 9 years but had been slowly growing especially over the past year or 2.  She denies any pain associated with this.    Most recently she is status post total thyroidectomy for papillary thyroid carcinoma.  Today the patient reports that she continues to have lower back pain.  She has been having chronic problems with anxiety and depression.  However she denies any suicidal or homicidal ideation.  She reports chronic fatigue. She is taking norco PRN neoplasm related pain.  She denies any fevers or chills. Denies night  sweats.  She denies any melena, hematochezia, hematemesis, hemoptysis or hematuria. She denies nausea. Denies vomiting or abdominal pain.  She denies cough.  She continues on ibrance and letrozole. Ibrance was started on 2/21/17.    PAST MEDICAL HISTORY:   1. HSIL on pap smear s/p LEEP    SURGICAL HISTORY:   1.  Tonsillectomy and adenoidectomy  2.  Appendectomy  3.  Cholecystectomy  4.  D&C  5.  Thyroidectomy on August 31, 2017    FAMILY HISTORY: She reports that her maternal aunt had lung cancer in her 70s.  She used to smoke cigarettes.  Her maternal cousin had lung cancer in her 60s.  She used to smoke cigarettes.  She denies any other immediate family members with cancer or bleeding/clotting disorders.    SOCIAL HISTORY: She reports a 45+ pack year smoking history. She quit in May 2017. She drinks wine occasionally.  She denies any history of recreational drug use.  She is engaged and has 2 sons.  She used to work for the [employer].  She lives in [place], [place].     ALLERGIES: [NKDA]    MEDICATIONS: [Medcard has been reviewed and/or reconciled.]    REVIEW OF SYSTEMS:   GENERAL: [No fevers, chills or sweats. Reports fatigue. Reports weight gain. Denies loss of appetite.]  HEENT: [No blurred vision, tinnitus, nasal discharge, sorethroat or dysphagia.]  HEART: [No chest pain, palpitations or shortness of breath.]    LUNGS: [Reports chronic cough. Denies hemoptysis or breathing problems.]  ABDOMEN: [No abdominal pain, nausea, vomiting, diarrhea, constipation or melena.]  GENITOURINARY: [No dysuria, bleeding or malodorous discharge.]  NEURO: [No headache, dizziness or vertigo.]  HEMATOLOGY: [No easy bruising, spontaneous bleeding or blood clots in the past].  MUSCULOSKELETAL: [Reports arthralgias, myalgias and bone pains.]  SKIN: [No rashes or skin lesions.]  PSYCHIATRY: [Reports depression. Reports anxiety.]  Denies suicidal/homicidal ideation.    PHYSICAL EXAMINATION:   VS: Reviewed on nurse's  notes.  APPEARANCE: The patient is a well-developed, well-nourished and well-groomed  female who appears in no acute distress.   HEENT: No scleral icterus. Both external auditory canals clear. No oral ulcers, lesions. Throat clear  HEAD: No sinus tenderness.  NECK: Supple. No palpable lymphadenopathy. Thyroid non-tender, no palpable masses  CHEST: Breath sounds clear bilaterally. No rales. No rhonchi. Unlabored respirations.  BREAST: Deferred today.  CARDIOVASCULAR: Normal S1, S2. Normal rate. Regular rhythm.  ABDOMEN: Bowel sounds normal. No tenderness. No abdominal distention. No hepatomegaly. No splenomegaly.  BACK: Palpable tenderness in the lower back.  LYMPHATIC: No palpable supraclavicular lymphadenopathy. Bilateral axillary healed wounds noted.  EXTREMITIES: No clubbing, cyanosis. Palpable firm 3 cm mass in the right elbow is noted. Trace edema in bilateral lower extremities.      SKIN: No lesions. No petechiae. No ecchymoses.   NEUROLOGIC: No focal findings. Alert & Oriented x 3. Mood appropriate to affect    LABS:   Reviewed    IMAGING:  Reviewed    IMPRESSION:  1.  Metastatic multifocal infiltrating ductal carcinoma of the right breast with right axillary and bone involvement [ER positive/IA positive/HER-2 negative]  2.  Papillary thyroid carcinoma  3.  Tobacco abuse  4.  Right elbow mass  5.  Anxiety and depression  6.  Right axillary hydradenitis/cellulitis and left inguinal hydradenitis/cellulitis s/p debridement and skin grafting    PLAN:  1.  I had a detailed discussion with the patient previously with regard to the diagnosis, prognosis and treatment options for metastatic multifocal invasive ductal carcinoma of the right breast with right axillary and bone involvement.  2.  I have discussed that at this time her metastatic malignancy is potentially treatable but not curable.  3.  The patient was again strongly encouraged to quit smoking.  She expressed understanding.  Prescription for Xanax  when necessary anxiety was previously given to the patient after full discussion of sedation precautions.    4. FSH and estradiol levels confirm menopausal status. TSH lab was normal.   5.  MRI of the brain on 1/31/17 to evaluate for any evidence of metastatic disease to complete staging workup was negative for metastatic malignancy to the brain. CT head done in March 2017 in ED was also ok.  6. Continue to proceed with ibrance/letrozole.  She appears to be tolerating this well so far with no significant side effects.   7. She has been advised to take calcium and vit d supplementation everyday. She is on xgeva injections for treatment of bone involvement by malignancy. Risks/benefits and common side effects discussed and she expressed understanding and agreement. She has dentures.  She will proceed with her next scheduled dose of Xgeva on 1/10/18.  8. Continue Lasix PRN for treatment of bilateral lower extremity edema.  Supplemental potassium was given as well.  Advised to use compression stockings and elevate legs.  9.  She will continue follow up with with Dr. Jason with endocrinology for follow up of papillary thyroid carcinoma.    10.  Results of MRI of the thoracic and lumbar spine from 9/21/17 for evaluation of current acute symptoms related to back pain reviewed in detail with pateint. No acute process involving the spinal cord is noted. She does have bone involvement by malignancy in multiple areas. However the PET/CT done on 9/27/17 shows no evidence of new areas of bony or visceral involvement.  Hence there appears to be no evidence of metastatic disease progression.  However since the patient continues to have low back pain and MRI of lumbar spine does show enhancing marrow replacing metastatic lesions are seen in the bilateral iliac bones posteriorly. She did see radiation oncology and completed palliative XRT to this location which helped significantly with her symptoms.    11.  The patient will  continue follow up with Dr. Guerin with clinical psychology for her chronic anxiety and depression.  The patient has been advised to seek immediate medical attention if any suicidal/homicidal ideation.  She expressed understanding.  12. PET/CT scan scheduled for today was not approved by her insurance. She is scheduled to get CT scans done today for reassessment of malignancy. She reports pain in her right ribs for the past few days. We will see what the scan shows.    Return to clinic in 4 weeks with labs to discuss further management and for her next dose of Xgeva.  She knows to call sooner for any new problems or questions.    Richard Delong MD

## 2018-01-10 NOTE — PATIENT INSTRUCTIONS
[facility]  [address]  [phone] phone     [phone] fax  Hours of Operation: Monday- Friday 8:00am- 5:00pm  After hours phone  [phone]  Hematology / Oncology Physicians on call      Dr. Jake Keith                        Please call with any concerns regarding your appointment today.

## 2018-01-12 DIAGNOSIS — C50.811 MALIGNANT NEOPLASM OF OVERLAPPING SITES OF RIGHT BREAST IN FEMALE, ESTROGEN RECEPTOR POSITIVE: Primary | ICD-10-CM

## 2018-01-12 DIAGNOSIS — G47.00 INSOMNIA, UNSPECIFIED TYPE: ICD-10-CM

## 2018-01-12 DIAGNOSIS — Z17.0 MALIGNANT NEOPLASM OF OVERLAPPING SITES OF RIGHT BREAST IN FEMALE, ESTROGEN RECEPTOR POSITIVE: Primary | ICD-10-CM

## 2018-01-12 RX ORDER — TEMAZEPAM 15 MG/1
15 CAPSULE ORAL NIGHTLY PRN
Qty: 30 CAPSULE | Refills: 0 | Status: SHIPPED | OUTPATIENT
Start: 2018-01-12 | End: 2018-02-11

## 2018-01-15 ENCOUNTER — HOSPITAL ENCOUNTER (OUTPATIENT)
Dept: RADIOLOGY | Facility: HOSPITAL | Age: 48
Discharge: HOME | End: 2018-01-15
Attending: INTERNAL MEDICINE
Payer: MEDICAID

## 2018-01-15 PROCEDURE — 71260 CT THORAX DX C+: CPT | Mod: TC

## 2018-01-15 PROCEDURE — 74177 CT ABD & PELVIS W/CONTRAST: CPT | Mod: TC

## 2018-01-15 PROCEDURE — 25500020 PHARM REV CODE 255: Performed by: INTERNAL MEDICINE

## 2018-01-15 RX ADMIN — IOHEXOL 30 ML: 350 INJECTION, SOLUTION INTRAVENOUS at 02:01

## 2018-01-15 RX ADMIN — IOHEXOL 75 ML: 350 INJECTION, SOLUTION INTRAVENOUS at 03:01

## 2018-01-17 ENCOUNTER — TELEPHONE (OUTPATIENT)
Dept: HEMATOLOGY/ONCOLOGY | Facility: CLINIC | Age: 48
End: 2018-01-17

## 2018-01-17 NOTE — TELEPHONE ENCOUNTER
----- Message from Isrrael Weiss sent at 1/16/2018  4:37 PM CST -----  Contact: pt   Pt missed a call and would like the nurse to return their call.        Pt can be reached at [phone].

## 2018-01-19 ENCOUNTER — TELEPHONE (OUTPATIENT)
Dept: HEMATOLOGY/ONCOLOGY | Facility: CLINIC | Age: 48
End: 2018-01-19

## 2018-01-19 ENCOUNTER — PATIENT MESSAGE (OUTPATIENT)
Dept: HEMATOLOGY/ONCOLOGY | Facility: CLINIC | Age: 48
End: 2018-01-19

## 2018-01-19 DIAGNOSIS — Z17.0 MALIGNANT NEOPLASM OF OVERLAPPING SITES OF RIGHT BREAST IN FEMALE, ESTROGEN RECEPTOR POSITIVE: ICD-10-CM

## 2018-01-19 DIAGNOSIS — C77.3 MALIGNANT NEOPLASM METASTATIC TO LYMPH NODE OF AXILLA: ICD-10-CM

## 2018-01-19 DIAGNOSIS — C50.811 MALIGNANT NEOPLASM OF OVERLAPPING SITES OF RIGHT BREAST IN FEMALE, ESTROGEN RECEPTOR POSITIVE: ICD-10-CM

## 2018-01-19 DIAGNOSIS — C79.51 SECONDARY CANCER OF BONE: ICD-10-CM

## 2018-01-19 DIAGNOSIS — G89.3 NEOPLASM RELATED PAIN: ICD-10-CM

## 2018-01-19 RX ORDER — HYDROCODONE BITARTRATE AND ACETAMINOPHEN 7.5; 325 MG/1; MG/1
1 TABLET ORAL EVERY 6 HOURS PRN
Qty: 60 TABLET | Refills: 0 | Status: SHIPPED | OUTPATIENT
Start: 2018-01-19 | End: 2018-02-05 | Stop reason: SDUPTHER

## 2018-01-19 NOTE — TELEPHONE ENCOUNTER
----- Message from Amy Morena sent at 1/19/2018  9:55 AM CST -----  Contact: Patient  1. What is the name of the medication you are requesting? Norco  2. What is the dose? 7.5  3. How do you take the medication? Orally, topically, etc? Orally  4. How often do you take this medication? Every 6 hours  5. Do you need a 30 day or 90 day supply? 30  6. How many refills are you requesting? 1  7. What is your preferred pharmacy and location of the pharmacy? [facility]  8. Who can we contact with further questions? Patient at [phone]

## 2018-01-22 DIAGNOSIS — Z17.0 MALIGNANT NEOPLASM OF OVERLAPPING SITES OF RIGHT BREAST IN FEMALE, ESTROGEN RECEPTOR POSITIVE: ICD-10-CM

## 2018-01-22 DIAGNOSIS — F41.9 ANXIETY: ICD-10-CM

## 2018-01-22 DIAGNOSIS — C77.3 MALIGNANT NEOPLASM METASTATIC TO LYMPH NODE OF AXILLA: ICD-10-CM

## 2018-01-22 DIAGNOSIS — F32.A DEPRESSION, UNSPECIFIED DEPRESSION TYPE: ICD-10-CM

## 2018-01-22 DIAGNOSIS — C79.51 SECONDARY CANCER OF BONE: ICD-10-CM

## 2018-01-22 DIAGNOSIS — G89.3 NEOPLASM RELATED PAIN: ICD-10-CM

## 2018-01-22 DIAGNOSIS — C50.811 MALIGNANT NEOPLASM OF OVERLAPPING SITES OF RIGHT BREAST IN FEMALE, ESTROGEN RECEPTOR POSITIVE: ICD-10-CM

## 2018-01-22 RX ORDER — LETROZOLE 2.5 MG/1
2.5 TABLET, FILM COATED ORAL DAILY
Qty: 90 TABLET | Refills: 1 | Status: SHIPPED | OUTPATIENT
Start: 2018-01-22 | End: 2018-08-03 | Stop reason: SDUPTHER

## 2018-01-31 ENCOUNTER — TELEPHONE (OUTPATIENT)
Dept: SMOKING CESSATION | Facility: CLINIC | Age: 48
End: 2018-01-31

## 2018-01-31 NOTE — TELEPHONE ENCOUNTER
Left message about missed intake for the tobacco cessation program and asked if the patient would like to reschedule. Left my name Jairo Delacruz with phone number [phone].

## 2018-02-05 ENCOUNTER — TELEPHONE (OUTPATIENT)
Dept: ENDOCRINOLOGY | Facility: CLINIC | Age: 48
End: 2018-02-05

## 2018-02-05 DIAGNOSIS — C77.3 MALIGNANT NEOPLASM METASTATIC TO LYMPH NODE OF AXILLA: ICD-10-CM

## 2018-02-05 DIAGNOSIS — Z17.0 MALIGNANT NEOPLASM OF OVERLAPPING SITES OF RIGHT BREAST IN FEMALE, ESTROGEN RECEPTOR POSITIVE: ICD-10-CM

## 2018-02-05 DIAGNOSIS — C50.811 MALIGNANT NEOPLASM OF OVERLAPPING SITES OF RIGHT BREAST IN FEMALE, ESTROGEN RECEPTOR POSITIVE: ICD-10-CM

## 2018-02-05 DIAGNOSIS — G89.3 NEOPLASM RELATED PAIN: ICD-10-CM

## 2018-02-05 DIAGNOSIS — C79.51 SECONDARY CANCER OF BONE: ICD-10-CM

## 2018-02-05 DIAGNOSIS — F41.9 ANXIETY: ICD-10-CM

## 2018-02-05 RX ORDER — ALPRAZOLAM 0.5 MG/1
0.5 TABLET ORAL 2 TIMES DAILY PRN
Qty: 60 TABLET | Refills: 0 | Status: SHIPPED | OUTPATIENT
Start: 2018-02-05 | End: 2018-03-05 | Stop reason: SDUPTHER

## 2018-02-05 RX ORDER — HYDROCODONE BITARTRATE AND ACETAMINOPHEN 7.5; 325 MG/1; MG/1
1 TABLET ORAL EVERY 6 HOURS PRN
Qty: 60 TABLET | Refills: 0 | Status: SHIPPED | OUTPATIENT
Start: 2018-02-05 | End: 2018-02-19 | Stop reason: SDUPTHER

## 2018-02-05 NOTE — TELEPHONE ENCOUNTER
----- Message from Linh George sent at 2/2/2018 11:31 AM CST -----  Pt at [phone]//or [phone]///states she missed her appt with Dr Jason yesterday//2/1/18 and is calling to reschedule//pt has Medicaid//please call/thanks/[facility]

## 2018-02-07 ENCOUNTER — INFUSION (OUTPATIENT)
Dept: INFUSION THERAPY | Facility: HOSPITAL | Age: 48
End: 2018-02-07
Attending: INTERNAL MEDICINE
Payer: MEDICAID

## 2018-02-07 ENCOUNTER — OFFICE VISIT (OUTPATIENT)
Dept: HEMATOLOGY/ONCOLOGY | Facility: CLINIC | Age: 48
End: 2018-02-07
Payer: MEDICAID

## 2018-02-07 VITALS — WEIGHT: 200.38 LBS | HEIGHT: 67 IN | BODY MASS INDEX: 31.45 KG/M2

## 2018-02-07 VITALS
BODY MASS INDEX: 31.47 KG/M2 | DIASTOLIC BLOOD PRESSURE: 73 MMHG | HEIGHT: 67 IN | OXYGEN SATURATION: 97 % | TEMPERATURE: 97 F | WEIGHT: 200.5 LBS | HEART RATE: 78 BPM | SYSTOLIC BLOOD PRESSURE: 126 MMHG

## 2018-02-07 DIAGNOSIS — C79.51 SECONDARY CANCER OF BONE: ICD-10-CM

## 2018-02-07 DIAGNOSIS — C77.3 MALIGNANT NEOPLASM METASTATIC TO LYMPH NODE OF AXILLA: ICD-10-CM

## 2018-02-07 DIAGNOSIS — C79.51 SECONDARY CANCER OF BONE: Primary | ICD-10-CM

## 2018-02-07 DIAGNOSIS — C50.811 MALIGNANT NEOPLASM OF OVERLAPPING SITES OF RIGHT BREAST IN FEMALE, ESTROGEN RECEPTOR POSITIVE: ICD-10-CM

## 2018-02-07 DIAGNOSIS — C50.811 MALIGNANT NEOPLASM OF OVERLAPPING SITES OF RIGHT BREAST IN FEMALE, ESTROGEN RECEPTOR POSITIVE: Primary | ICD-10-CM

## 2018-02-07 DIAGNOSIS — Z17.0 MALIGNANT NEOPLASM OF OVERLAPPING SITES OF RIGHT BREAST IN FEMALE, ESTROGEN RECEPTOR POSITIVE: Primary | ICD-10-CM

## 2018-02-07 DIAGNOSIS — G89.3 NEOPLASM RELATED PAIN: ICD-10-CM

## 2018-02-07 DIAGNOSIS — Z17.0 MALIGNANT NEOPLASM OF OVERLAPPING SITES OF RIGHT BREAST IN FEMALE, ESTROGEN RECEPTOR POSITIVE: ICD-10-CM

## 2018-02-07 PROCEDURE — 63600175 PHARM REV CODE 636 W HCPCS: Mod: JG | Performed by: INTERNAL MEDICINE

## 2018-02-07 PROCEDURE — 99215 OFFICE O/P EST HI 40 MIN: CPT | Mod: 25,S$PBB,, | Performed by: INTERNAL MEDICINE

## 2018-02-07 PROCEDURE — 96372 THER/PROPH/DIAG INJ SC/IM: CPT

## 2018-02-07 PROCEDURE — 3008F BODY MASS INDEX DOCD: CPT | Mod: ,,, | Performed by: INTERNAL MEDICINE

## 2018-02-07 PROCEDURE — 99999 PR PBB SHADOW E&M-EST. PATIENT-LVL III: CPT | Mod: PBBFAC,,, | Performed by: INTERNAL MEDICINE

## 2018-02-07 PROCEDURE — 99213 OFFICE O/P EST LOW 20 MIN: CPT | Mod: PBBFAC,25 | Performed by: INTERNAL MEDICINE

## 2018-02-07 RX ADMIN — DENOSUMAB 120 MG: 120 INJECTION SUBCUTANEOUS at 10:02

## 2018-02-07 NOTE — PROGRESS NOTES
Reason for visit: Right breast carcinoma  Date of Diagnosis: January 12, 2017    HPI:   The patient is a 47-year-old  female who presents to the hematology oncology clinic today for follow up for metastatic right breast carcinoma.    I have reviewed all of the patient's relevant clinical history available in the medical record and have utilized this in my evaluation and management recommendations today.  The patient had 2 separate areas in the right breast that were biopsied area the first was a right breast mass at 11:00 which was 3 cm from the nipple which showed invasive mammary carcinoma.  The invasive carcinoma measured at least 1.2 cm in greatest dimension and appeared high-grade.  This tumor was ER positive/AL positive/HER-2 negative.  The second breast mass was biopsied from the retroperitoneal area lower area and was also positive for invasive mammary carcinoma.  This measured at least 0.4 cm and also appeared high-grade.  The tumor is morphologically very similar to the tumor in the prior specimen.  Receptor studies were not done on this specimen.  The patient also had a palpable right axillary lymph node which was biopsied and showed metastatic mammary carcinoma which measured at least 1.3 cm in greatest dimension.  The patient had self palpated this breast mass.  In addition the patient has a palpable mass in the region of her right elbow which she reports has been present for 9 years but had been slowly growing especially over the past year or 2.  She denies any pain associated with this.    Most recently she is status post total thyroidectomy for papillary thyroid carcinoma.  Today the patient reports that she continues to have lower back pain.  She has been having chronic problems with anxiety and depression.  However she denies any suicidal or homicidal ideation.  She reports chronic fatigue. She is taking norco PRN neoplasm related pain.  She denies any fevers or chills. Denies night  sweats.  She denies any melena, hematochezia, hematemesis, hemoptysis or hematuria. She denies nausea. Denies vomiting or abdominal pain.  She denies cough.  She continues on ibrance and letrozole. Ibrance was started on 2/21/17.    PAST MEDICAL HISTORY:   1. HSIL on pap smear s/p LEEP    SURGICAL HISTORY:   1.  Tonsillectomy and adenoidectomy  2.  Appendectomy  3.  Cholecystectomy  4.  D&C  5.  Thyroidectomy on August 31, 2017    FAMILY HISTORY: She reports that her maternal aunt had lung cancer in her 70s.  She used to smoke cigarettes.  Her maternal cousin had lung cancer in her 60s.  She used to smoke cigarettes.  She denies any other immediate family members with cancer or bleeding/clotting disorders.    SOCIAL HISTORY: She reports a 45+ pack year smoking history. She quit in May 2017. She drinks wine occasionally.  She denies any history of recreational drug use.  She is engaged and has 2 sons.  She used to work for [employer].  She lives in [place], [place].     ALLERGIES: [NKDA]    MEDICATIONS: [Medcard has been reviewed and/or reconciled.]    REVIEW OF SYSTEMS:   GENERAL: [No fevers, chills or sweats. Reports fatigue. Reports weight gain. Denies loss of appetite.]  HEENT: [No blurred vision, tinnitus, nasal discharge, sorethroat or dysphagia.]  HEART: [No chest pain, palpitations or shortness of breath.]    LUNGS: [Reports chronic cough. Denies hemoptysis or breathing problems.]  ABDOMEN: [No abdominal pain, nausea, vomiting, diarrhea, constipation or melena.]  GENITOURINARY: [No dysuria, bleeding or malodorous discharge.]  NEURO: [No headache, dizziness or vertigo.]  HEMATOLOGY: [No easy bruising, spontaneous bleeding or blood clots in the past].  MUSCULOSKELETAL: [Reports arthralgias, myalgias and bone pains.]  SKIN: [No rashes or skin lesions.]  PSYCHIATRY: [Reports depression. Reports anxiety.]  Denies suicidal/homicidal ideation.    PHYSICAL EXAMINATION:   VS: Reviewed on nurse's  notes.  APPEARANCE: The patient is a well-developed, well-nourished and well-groomed  female who appears in no acute distress.   HEENT: No scleral icterus. Both external auditory canals clear. No oral ulcers, lesions. Throat clear  HEAD: No sinus tenderness.  NECK: Supple. No palpable lymphadenopathy. Thyroid non-tender, no palpable masses  CHEST: Breath sounds clear bilaterally. No rales. No rhonchi. Unlabored respirations.  BREAST: Deferred today.  CARDIOVASCULAR: Normal S1, S2. Normal rate. Regular rhythm.  ABDOMEN: Bowel sounds normal. No tenderness. No abdominal distention. No hepatomegaly. No splenomegaly.  BACK: Palpable tenderness in the lower back.  LYMPHATIC: No palpable supraclavicular lymphadenopathy. Bilateral axillary healed wounds noted.  EXTREMITIES: No clubbing, cyanosis. Palpable firm 3 cm mass in the right elbow is noted. Trace edema in bilateral lower extremities.      SKIN: No lesions. No petechiae. No ecchymoses.   NEUROLOGIC: No focal findings. Alert & Oriented x 3. Mood appropriate to affect    LABS:   Reviewed    IMAGING:  Reviewed    IMPRESSION:  1.  Metastatic multifocal infiltrating ductal carcinoma of the right breast with right axillary and bone involvement [ER positive/DE positive/HER-2 negative]  2.  Papillary thyroid carcinoma  3.  Tobacco abuse  4.  Right elbow mass  5.  Anxiety and depression  6.  Right axillary hydradenitis/cellulitis and left inguinal hydradenitis/cellulitis s/p debridement and skin grafting    PLAN:  1.  I had a detailed discussion with the patient previously with regard to the diagnosis, prognosis and treatment options for metastatic multifocal invasive ductal carcinoma of the right breast with right axillary and bone involvement.  2.  I have discussed that at this time her metastatic malignancy is potentially treatable but not curable.  3.  The patient was again strongly encouraged to quit smoking.  She expressed understanding.  Prescription for Xanax  when necessary anxiety was previously given to the patient after full discussion of sedation precautions.    4. FSH and estradiol levels confirm menopausal status. TSH lab was normal.   5.  MRI of the brain on 1/31/17 to evaluate for any evidence of metastatic disease to complete staging workup was negative for metastatic malignancy to the brain. CT head done in March 2017 in ED was also ok.  6. Continue to proceed with ibrance/letrozole.  She appears to be tolerating this well so far with no significant side effects.   7. She has been advised to take calcium and vit d supplementation everyday. She is on xgeva injections for treatment of bone involvement by malignancy. Risks/benefits and common side effects discussed and she expressed understanding and agreement. She has dentures.  She will proceed with her next scheduled dose of Xgeva on 2/7/18.  8. Continue Lasix PRN for treatment of bilateral lower extremity edema.  Supplemental potassium was given as well.  Advised to use compression stockings and elevate legs.  9.  She will continue follow up with with Dr. Jason with endocrinology for follow up of papillary thyroid carcinoma.    10.  Results of MRI of the thoracic and lumbar spine from 9/21/17 for evaluation of current acute symptoms related to back pain reviewed in detail with pateint. No acute process involving the spinal cord is noted. She does have bone involvement by malignancy in multiple areas. However the PET/CT done on 9/27/17 shows no evidence of new areas of bony or visceral involvement.  Hence there appears to be no evidence of metastatic disease progression.  However since the patient continues to have low back pain and MRI of lumbar spine does show enhancing marrow replacing metastatic lesions are seen in the bilateral iliac bones posteriorly. She did see radiation oncology and completed palliative XRT to this location which helped significantly with her symptoms.    11.  The patient will continue  follow up with Dr. Guerin with clinical psychology for her chronic anxiety and depression.  The patient has been advised to seek immediate medical attention if any suicidal/homicidal ideation.  She expressed understanding.  12. CT scans done in Jan 2018 shows that her metastatic breast cancer continues to be stable/well controlled. I will plan to recheck in April 2018.     Return to clinic in 4 weeks with labs to discuss further management and for her next dose of Xgeva.  She knows to call sooner for any new problems or questions.    Richard Delong MD

## 2018-02-07 NOTE — PATIENT INSTRUCTIONS
[facility]  [address]  [address] [address]  [phone] phone     [phone] fax  Hours of Operation: Monday- Friday 8:00am- 5:00pm  After hours phone  [phone]  Hematology / Oncology Physicians on call      Dr. Jake Keith                        Please call with any concerns regarding your appointment today.      Calcium Supplements   Calcium is a mineral that helps us make strong bones and teeth. Most of the calcium in our bodies is in our bones. We spend almost half of our lives (30 to 35 years) building to a peak bone mass. Taking in enough calcium helps younger people build strong bones. Maintaining a safe calcium level helps older people limit bone loss. Our bodies cannot make calcium, so we must get it from foods or supplements.   Why Use a Supplement?   You might need a supplement if you fall in to one or more of the following categories:   I dont eat dairy products or other foods that are high in calcium (such as kale, bok andres, and calcium-fortified foods) 2 to 3 times a day.   I am a woman past menopause.   I am pregnant or breastfeeding.   I do not get frequent weight-bearing exercise (such as walking, running, or weightlifting).  Suggested Daily Intake   The daily recommended amount of calcium depends on many factors, including your age and sex.   Your health care provider can help you choose the best amount of calcium for you.    If You Take Calcium   Here are some tips to help you get the most from a calcium supplement:   Choose calcium carbonate or calcium citrate. (The citrate form is easy to absorb.) Avoid calcium from oyster shells. Aim for calcium that is plant-based.   Check the label for elemental calcium. You need 1,000 to 1,500 mg.   If you also take an iron supplement, take it a few hours before or after the calcium.   Be aware that taking calcium interferes with the absorption of certain types of  antibiotics. Talk to your pharmacist.   Eat a balanced diet to supply all the nutrients your body needs.  Food Sources of Calcium   Milk, yogurt, and cheese   Certain green leafy vegetables, such as kale, bok andres, and hu greens   Fish with bones, such as canned salmon, mackerel, and sardines   Tofu made with calcium carbonate (not the type called nigiri)   Drinks that have calcium added, such as some orange juice and rice and soy drinks    © 1435-0252 [employer], [address] [place] [address]. All rights reserved. This information is not intended as a substitute for professional medical care. Always follow your healthcare professional's instructions.           FALL PREVENTION   Falls often occur due to slipping, tripping or losing your balance. Here are ways to reduce your risk of falling again.   Was there anything that caused your fall that can be fixed, removed or replaced?   Make your home safe by keeping walkways clear of objects you may trip over.   Use non-slip pads under rugs.   Do not walk in poorly lit areas.   Do not stand on chairs or wobbly ladders.   Use caution when reaching overhead or looking upward. This position can cause a loss of balance.   Be sure your shoes fit properly, have non-slip bottoms and are in good condition.   Be cautious when going up and down stairs, curbs, and when walking on uneven sidewalks.   If your balance is poor, consider using a cane or walker.   If your fall was related to alcohol use, stop or limit alcohol intake.   If your fall was related to use of sleeping medicines, talk to your doctor about this. You may need to reduce your dosage at bedtime if you awaken during the night to go to the bathroom.   To reduce the need for nighttime bathroom trips:   Avoid drinking fluids for several hours before going to bed   Empty your bladder before going to bed   Men can keep a urinal at the bedside   © 0672-3190 [employer], [address] [place] [address],  RYAN Scott [address]. All rights reserved. This information is not intended as a substitute for professional medical care. Always follow your healthcare professional's instructions.      Remember to take your calcium with vitamin D supplement as directed.   Do not have any dental work for 3 months following your injection today.

## 2018-02-07 NOTE — NURSING
1040  2/7/18  Injection given without difficulties.Bandaid applied. Patient instructed to stay in the clinic for 15 minutes. Patient verbalized understanding and will notify nurse with any complaints.

## 2018-02-13 ENCOUNTER — TELEPHONE (OUTPATIENT)
Dept: HEMATOLOGY/ONCOLOGY | Facility: CLINIC | Age: 48
End: 2018-02-13

## 2018-02-13 DIAGNOSIS — G89.3 NEOPLASM RELATED PAIN: ICD-10-CM

## 2018-02-13 DIAGNOSIS — C77.3 MALIGNANT NEOPLASM METASTATIC TO LYMPH NODE OF AXILLA: ICD-10-CM

## 2018-02-13 DIAGNOSIS — Z17.0 MALIGNANT NEOPLASM OF OVERLAPPING SITES OF RIGHT BREAST IN FEMALE, ESTROGEN RECEPTOR POSITIVE: ICD-10-CM

## 2018-02-13 DIAGNOSIS — C79.51 SECONDARY CANCER OF BONE: ICD-10-CM

## 2018-02-13 DIAGNOSIS — C50.811 MALIGNANT NEOPLASM OF OVERLAPPING SITES OF RIGHT BREAST IN FEMALE, ESTROGEN RECEPTOR POSITIVE: ICD-10-CM

## 2018-02-13 NOTE — TELEPHONE ENCOUNTER
Completed prior auth via CoverMyMeds. Obtained favorable outcome. Called and spoke to Miladys at pharmacy; claim paid, $3 co-pay. Pharmacy will contact pt to set up delivery of next refill for Ibrance.

## 2018-02-13 NOTE — TELEPHONE ENCOUNTER
----- Message from Padma Llamas LPN sent at 2018  1:20 PM CST -----  Contact: darell Armenta  I did just call and they said the previous PA was , and they needed another initiated.  I don't have Baljeet's NPI. They couldn't go any further without that. Sorry.    ----- Message -----  From: Huma Crabtree LCSW  Sent: 2018   1:09 PM  To: Padma Llamas LPN    We can help if needed. Are you able to call and find out if they just need a prior auth initiated?   ----- Message -----  From: Padma Llamas LPN  Sent: 2018  12:18 PM  To: AMINA Howell, is this something that you all do?   ----- Message -----  From: Andrade Roman  Sent: 2018   8:47 AM  To: Baljeet Ramos Staff    She's calling in regards to a prior authorization for: Iderance 125 mg, she can be reached at ph# [phone], [facility] ref# [id number]

## 2018-02-19 ENCOUNTER — TELEPHONE (OUTPATIENT)
Dept: HEMATOLOGY/ONCOLOGY | Facility: CLINIC | Age: 48
End: 2018-02-19

## 2018-02-19 DIAGNOSIS — C77.3 MALIGNANT NEOPLASM METASTATIC TO LYMPH NODE OF AXILLA: ICD-10-CM

## 2018-02-19 DIAGNOSIS — Z17.0 MALIGNANT NEOPLASM OF OVERLAPPING SITES OF RIGHT BREAST IN FEMALE, ESTROGEN RECEPTOR POSITIVE: ICD-10-CM

## 2018-02-19 DIAGNOSIS — C79.51 SECONDARY CANCER OF BONE: ICD-10-CM

## 2018-02-19 DIAGNOSIS — G89.3 NEOPLASM RELATED PAIN: ICD-10-CM

## 2018-02-19 DIAGNOSIS — C50.811 MALIGNANT NEOPLASM OF OVERLAPPING SITES OF RIGHT BREAST IN FEMALE, ESTROGEN RECEPTOR POSITIVE: ICD-10-CM

## 2018-02-19 RX ORDER — HYDROCODONE BITARTRATE AND ACETAMINOPHEN 7.5; 325 MG/1; MG/1
1 TABLET ORAL EVERY 6 HOURS PRN
Qty: 120 TABLET | Refills: 0 | Status: ON HOLD | OUTPATIENT
Start: 2018-02-19 | End: 2018-03-14

## 2018-02-19 NOTE — TELEPHONE ENCOUNTER
----- Message from Radha Wang sent at 2/19/2018 10:57 AM CST -----  Contact: Patient   1. What is the name of the medication you are requesting? Rx Norco  2. What is the dose? 7.5mg  3. How do you take the medication? Orally, topically, etc? oral  4. How often do you take this medication? As needed  5. Do you need a 30 day or 90 day supply? n/a  6. How many refills are you requesting? n/a  7. What is your preferred pharmacy and location of the pharmacy?   [facility] [address] [place] [address]  Phone: [phone] Fax: [phone]  8. Who can we contact with further questions? Patient/[phone]

## 2018-02-21 DIAGNOSIS — C77.3 MALIGNANT NEOPLASM METASTATIC TO LYMPH NODE OF AXILLA: ICD-10-CM

## 2018-02-21 DIAGNOSIS — Z17.0 MALIGNANT NEOPLASM OF OVERLAPPING SITES OF RIGHT BREAST IN FEMALE, ESTROGEN RECEPTOR POSITIVE: ICD-10-CM

## 2018-02-21 DIAGNOSIS — C50.811 MALIGNANT NEOPLASM OF OVERLAPPING SITES OF RIGHT BREAST IN FEMALE, ESTROGEN RECEPTOR POSITIVE: ICD-10-CM

## 2018-02-21 DIAGNOSIS — C79.51 SECONDARY CANCER OF BONE: ICD-10-CM

## 2018-02-21 DIAGNOSIS — G89.3 NEOPLASM RELATED PAIN: ICD-10-CM

## 2018-02-21 RX ORDER — PALBOCICLIB 125 MG/1
CAPSULE ORAL
Qty: 21 CAPSULE | Refills: 2 | Status: SHIPPED | OUTPATIENT
Start: 2018-02-21 | End: 2018-05-01 | Stop reason: SDUPTHER

## 2018-03-05 ENCOUNTER — OFFICE VISIT (OUTPATIENT)
Dept: SURGERY | Facility: CLINIC | Age: 48
End: 2018-03-05
Payer: MEDICAID

## 2018-03-05 VITALS
SYSTOLIC BLOOD PRESSURE: 105 MMHG | HEART RATE: 73 BPM | DIASTOLIC BLOOD PRESSURE: 67 MMHG | WEIGHT: 200 LBS | TEMPERATURE: 98 F | BODY MASS INDEX: 31.32 KG/M2

## 2018-03-05 DIAGNOSIS — R22.9 SUBCUTANEOUS MASS: Primary | ICD-10-CM

## 2018-03-05 DIAGNOSIS — F41.9 ANXIETY: ICD-10-CM

## 2018-03-05 PROCEDURE — 99999 PR PBB SHADOW E&M-EST. PATIENT-LVL III: CPT | Mod: PBBFAC,,, | Performed by: SURGERY

## 2018-03-05 PROCEDURE — 99213 OFFICE O/P EST LOW 20 MIN: CPT | Mod: PBBFAC | Performed by: SURGERY

## 2018-03-05 PROCEDURE — 99213 OFFICE O/P EST LOW 20 MIN: CPT | Mod: S$PBB,,, | Performed by: SURGERY

## 2018-03-05 RX ORDER — ONDANSETRON 4 MG/1
8 TABLET, ORALLY DISINTEGRATING ORAL EVERY 8 HOURS PRN
Status: CANCELLED | OUTPATIENT
Start: 2018-03-05

## 2018-03-05 RX ORDER — ALPRAZOLAM 0.5 MG/1
0.5 TABLET ORAL 2 TIMES DAILY PRN
Qty: 60 TABLET | Refills: 0 | Status: SHIPPED | OUTPATIENT
Start: 2018-03-05 | End: 2018-04-05 | Stop reason: SDUPTHER

## 2018-03-05 NOTE — PATIENT INSTRUCTIONS
Surgery scheduled for March 14 at 7 in the morning.    The hospital call you with a time for arrival.    No solid food after midnight but you may have clear liquids up to 3 hours before your surgery.    Please take all your morning medications with the simple water    .gpre

## 2018-03-05 NOTE — PROGRESS NOTES
Patient ID: Josey Flores is a 47 y.o. female.    Right elbow area mass    Chief Complaint: No chief complaint on file.      HPI:  She has had a mass at the medial aspect of her right arm at the elbow.  Is slowly enlarged.  His becoming problematic.  It causes discomfort when she leans against it or is an elbow on her surface.  She also describes a small mass of the upper arm on the right        Has a history of metastatic breast cancer that's being treated with Ibrace.  She has also had surgery for hidradenitis        Review of Systems   Constitutional: Negative for appetite change, chills, fatigue, fever and unexpected weight change.   HENT: Negative for hearing loss and rhinorrhea.    Eyes: Negative for visual disturbance.   Respiratory: Negative for apnea, cough, shortness of breath and wheezing.    Cardiovascular: Negative for chest pain and palpitations.   Gastrointestinal: Negative for abdominal distention, abdominal pain, blood in stool, constipation, diarrhea, nausea and vomiting.   Genitourinary: Negative for dysuria, frequency and urgency.   Musculoskeletal: Negative for arthralgias and neck pain.   Skin: Negative for rash.        Mass at the medial partition of the right elbow and a second mass in the right upper arm   Neurological: Negative for seizures, weakness, numbness and headaches.   Hematological: Negative for adenopathy. Does not bruise/bleed easily.   Psychiatric/Behavioral: Negative for hallucinations. The patient is not nervous/anxious.        Current Outpatient Prescriptions   Medication Sig Dispense Refill    albuterol 90 mcg/actuation inhaler Inhale 2 puffs into the lungs every 6 (six) hours. 1 Inhaler 12    ALPRAZolam (XANAX) 0.5 MG tablet Take 1 tablet (0.5 mg total) by mouth 2 (two) times daily as needed for Insomnia or Anxiety. 60 tablet 0    buPROPion (WELLBUTRIN) 100 MG tablet Take 1 tablet (100 mg total) by mouth 2 (two) times daily. 60 tablet 11    calcium carbonate 400 mg  calcium (1,000 mg) Chew Take 2,000 mg by mouth once daily.      citalopram (CELEXA) 20 MG tablet Take 1 tablet (20 mg total) by mouth once daily. (Patient taking differently: Take 20 mg by mouth every evening. ) 30 tablet 2    ergocalciferol (VITAMIN D2) 50,000 unit Cap Take 5,000 Units by mouth once daily at 6am.       furosemide (LASIX) 20 MG tablet Take 1 tablet (20 mg total) by mouth once daily. 30 tablet 1    hydrocodone-acetaminophen 7.5-325mg (NORCO) 7.5-325 mg per tablet Take 1 tablet by mouth every 6 (six) hours as needed for Pain. 120 tablet 0    IBRANCE 125 mg Cap TAKE 1 CAPSULE (125 MG) DAILY FOR 21 DAYS, FOLLOWED BY 7 DAY REST PERIOD TO COMPLETE A 28 DAY TREATMENT CYCLE 21 capsule 2    letrozole (FEMARA) 2.5 mg Tab Take 1 tablet (2.5 mg total) by mouth once daily. 90 tablet 1    levothyroxine (SYNTHROID) 137 MCG Tab tablet Take 1 tablet (137 mcg total) by mouth before breakfast. 30 tablet 3    multivitamin (THERAGRAN) per tablet Take 1 tablet by mouth once daily.      ondansetron (ZOFRAN) 8 MG tablet Take 1 tablet (8 mg total) by mouth every 12 (twelve) hours as needed for Nausea. 30 tablet 2    potassium chloride SA (K-DUR,KLOR-CON) 20 MEQ tablet Take 1 tablet (20 mEq total) by mouth once daily. 30 tablet 1    prochlorperazine (COMPAZINE) 5 MG tablet Take 1 tablet (5 mg total) by mouth every 6 (six) hours as needed for Nausea. 30 tablet 1     No current facility-administered medications for this visit.        Review of patient's allergies indicates:   Allergen Reactions    Vancomycin analogues Itching       Past Medical History:   Diagnosis Date    Anxiety     Asthma     Cancer     right breast, metastatic-lymph nodes, bone, and thyroid     COPD (chronic obstructive pulmonary disease)     Depression     History of psychiatric hospitalization     2000; suicidal ideation    Hx of psychiatric care     Hypothyroidism     Miscarriage     five miscarriages    Obesity     Psychiatric  problem     Thyroid cancer 2017       Past Surgical History:   Procedure Laterality Date    ABSCESS DRAINAGE      bilateral axilla    ADENOIDECTOMY      APPENDECTOMY      CHOLECYSTECTOMY      SKIN GRAFT  06/16/2017    THYROIDECTOMY Bilateral     TONSILLECTOMY         Family History   Problem Relation Age of Onset    Arthritis Mother     Early death Mother      64 at time of death    Heart attack Mother     Heart disease Mother     Heart failure Mother     Hypertension Mother     Coronary artery disease Father     Hearing loss Father     Heart disease Father     Hyperlipidemia Father     Hypertension Father     Mental illness Father     Learning disabilities Son        Social History     Social History    Marital status:      Spouse name: N/A    Number of children: 2    Years of education: N/A     Occupational History    Not on file.     Social History Main Topics    Smoking status: Current Every Day Smoker     Packs/day: 1.00     Years: 30.00     Types: Cigarettes     Last attempt to quit: 6/11/2017    Smokeless tobacco: Never Used      Comment: 45 pack year history    Alcohol use No    Drug use: No    Sexual activity: Yes     Partners: Male     Other Topics Concern    Not on file     Social History Narrative     with two adult children; common law marriage (10+years); raised Jew, no current Islam practice       There were no vitals filed for this visit.    Physical Exam   Constitutional: She is oriented to person, place, and time. She appears well-developed and well-nourished.   HENT:   Head: Normocephalic.   Neck:   Healed anterior neck incision   Cardiovascular: Normal rate, regular rhythm and normal heart sounds.    Pulmonary/Chest: Breath sounds normal. She has no wheezes.   Abdominal: Soft. Bowel sounds are normal. She exhibits no distension and no mass. There is no hepatosplenomegaly. There is no tenderness. There is no rigidity, no rebound and no  guarding.   Musculoskeletal: Normal range of motion. She exhibits no edema.   Neurological: She is oriented to person, place, and time.   Skin: Skin is warm and dry. No rash noted. No erythema.   On the upper arm there is a 1 cm firm mobile mass with a punctate skin opening.    On the medial aspect of the right arm near the elbow there is a 3 x 4 cm firm mobile mass   Psychiatric: She has a normal mood and affect.   Vitals reviewed.      EBC and CMP from 2/7/18 were reviewedc  Assessment & Plan:    1.  Right arm mass, medially at the level of the elbow  2.  Sebaceous cyst.    Excision in the operating room.  The risks include infection, bleeding and seroma formation.    Consent was obtained.    She has had labs on 2/7/14 so no additional studies are needed

## 2018-03-07 ENCOUNTER — SOCIAL WORK (OUTPATIENT)
Dept: HEMATOLOGY/ONCOLOGY | Facility: CLINIC | Age: 48
End: 2018-03-07

## 2018-03-07 ENCOUNTER — OFFICE VISIT (OUTPATIENT)
Dept: HEMATOLOGY/ONCOLOGY | Facility: CLINIC | Age: 48
End: 2018-03-07
Payer: MEDICAID

## 2018-03-07 ENCOUNTER — INFUSION (OUTPATIENT)
Dept: INFUSION THERAPY | Facility: HOSPITAL | Age: 48
End: 2018-03-07
Attending: INTERNAL MEDICINE
Payer: MEDICAID

## 2018-03-07 ENCOUNTER — HOSPITAL ENCOUNTER (EMERGENCY)
Facility: HOSPITAL | Age: 48
Discharge: HOME | End: 2018-03-08
Attending: EMERGENCY MEDICINE
Payer: MEDICAID

## 2018-03-07 VITALS
OXYGEN SATURATION: 96 % | DIASTOLIC BLOOD PRESSURE: 71 MMHG | HEIGHT: 67 IN | SYSTOLIC BLOOD PRESSURE: 114 MMHG | BODY MASS INDEX: 34.26 KG/M2 | WEIGHT: 218.25 LBS | HEART RATE: 79 BPM | TEMPERATURE: 98 F

## 2018-03-07 VITALS
SYSTOLIC BLOOD PRESSURE: 106 MMHG | TEMPERATURE: 98 F | BODY MASS INDEX: 34.21 KG/M2 | RESPIRATION RATE: 20 BRPM | HEART RATE: 72 BPM | HEIGHT: 67 IN | OXYGEN SATURATION: 97 % | DIASTOLIC BLOOD PRESSURE: 52 MMHG | WEIGHT: 218 LBS

## 2018-03-07 DIAGNOSIS — C77.3 MALIGNANT NEOPLASM METASTATIC TO LYMPH NODE OF AXILLA: ICD-10-CM

## 2018-03-07 DIAGNOSIS — C50.811 MALIGNANT NEOPLASM OF OVERLAPPING SITES OF RIGHT BREAST IN FEMALE, ESTROGEN RECEPTOR POSITIVE: Primary | ICD-10-CM

## 2018-03-07 DIAGNOSIS — Z17.0 MALIGNANT NEOPLASM OF OVERLAPPING SITES OF RIGHT BREAST IN FEMALE, ESTROGEN RECEPTOR POSITIVE: Primary | ICD-10-CM

## 2018-03-07 DIAGNOSIS — R31.9 HEMATURIA, UNSPECIFIED TYPE: ICD-10-CM

## 2018-03-07 DIAGNOSIS — R10.9 ABDOMINAL PAIN, UNSPECIFIED ABDOMINAL LOCATION: Primary | ICD-10-CM

## 2018-03-07 DIAGNOSIS — C79.51 SECONDARY CANCER OF BONE: ICD-10-CM

## 2018-03-07 LAB
ALBUMIN SERPL BCP-MCNC: 4.1 G/DL
ALP SERPL-CCNC: 62 U/L
ALT SERPL W/O P-5'-P-CCNC: 26 U/L
ANION GAP SERPL CALC-SCNC: 12 MMOL/L
AST SERPL-CCNC: 43 U/L
BASOPHILS # BLD AUTO: 0.02 K/UL
BASOPHILS NFR BLD: 0.7 %
BILIRUB SERPL-MCNC: 0.5 MG/DL
BILIRUB UR QL STRIP: NEGATIVE
BUN SERPL-MCNC: 9 MG/DL
CALCIUM SERPL-MCNC: 9 MG/DL
CHLORIDE SERPL-SCNC: 107 MMOL/L
CLARITY UR: CLEAR
CO2 SERPL-SCNC: 20 MMOL/L
COLOR UR: YELLOW
CREAT SERPL-MCNC: 1.1 MG/DL
DIFFERENTIAL METHOD: ABNORMAL
EOSINOPHIL # BLD AUTO: 0 K/UL
EOSINOPHIL NFR BLD: 1.3 %
ERYTHROCYTE [DISTWIDTH] IN BLOOD BY AUTOMATED COUNT: 13.5 %
EST. GFR  (AFRICAN AMERICAN): >60 ML/MIN/1.73 M^2
EST. GFR  (NON AFRICAN AMERICAN): 59 ML/MIN/1.73 M^2
GLUCOSE SERPL-MCNC: 103 MG/DL
GLUCOSE UR QL STRIP: NEGATIVE
HCT VFR BLD AUTO: 35.4 %
HGB BLD-MCNC: 12.5 G/DL
HGB UR QL STRIP: ABNORMAL
KETONES UR QL STRIP: NEGATIVE
LEUKOCYTE ESTERASE UR QL STRIP: NEGATIVE
LYMPHOCYTES # BLD AUTO: 1.3 K/UL
LYMPHOCYTES NFR BLD: 43.5 %
MCH RBC QN AUTO: 38.2 PG
MCHC RBC AUTO-ENTMCNC: 35.3 G/DL
MCV RBC AUTO: 108 FL
MICROSCOPIC COMMENT: ABNORMAL
MONOCYTES # BLD AUTO: 0.2 K/UL
MONOCYTES NFR BLD: 7.6 %
NEUTROPHILS # BLD AUTO: 1.4 K/UL
NEUTROPHILS NFR BLD: 46.9 %
NITRITE UR QL STRIP: NEGATIVE
PH UR STRIP: 7 [PH] (ref 5–8)
PLATELET # BLD AUTO: 183 K/UL
PMV BLD AUTO: 10.6 FL
POTASSIUM SERPL-SCNC: 5.5 MMOL/L
PROT SERPL-MCNC: 7.9 G/DL
PROT UR QL STRIP: NEGATIVE
RBC # BLD AUTO: 3.27 M/UL
RBC #/AREA URNS HPF: 6 /HPF (ref 0–4)
SODIUM SERPL-SCNC: 139 MMOL/L
SP GR UR STRIP: 1.01 (ref 1–1.03)
SQUAMOUS #/AREA URNS HPF: 2 /HPF
URN SPEC COLLECT METH UR: ABNORMAL
UROBILINOGEN UR STRIP-ACNC: ABNORMAL EU/DL
WBC # BLD AUTO: 3.01 K/UL

## 2018-03-07 PROCEDURE — 96372 THER/PROPH/DIAG INJ SC/IM: CPT

## 2018-03-07 PROCEDURE — 99215 OFFICE O/P EST HI 40 MIN: CPT | Mod: S$PBB,,, | Performed by: INTERNAL MEDICINE

## 2018-03-07 PROCEDURE — 96374 THER/PROPH/DIAG INJ IV PUSH: CPT

## 2018-03-07 PROCEDURE — 25000003 PHARM REV CODE 250: Performed by: EMERGENCY MEDICINE

## 2018-03-07 PROCEDURE — 85025 COMPLETE CBC W/AUTO DIFF WBC: CPT | Mod: 91

## 2018-03-07 PROCEDURE — 99284 EMERGENCY DEPT VISIT MOD MDM: CPT | Mod: 25,27

## 2018-03-07 PROCEDURE — 63600175 PHARM REV CODE 636 W HCPCS: Performed by: EMERGENCY MEDICINE

## 2018-03-07 PROCEDURE — 80053 COMPREHEN METABOLIC PANEL: CPT | Mod: 91

## 2018-03-07 PROCEDURE — 99213 OFFICE O/P EST LOW 20 MIN: CPT | Mod: PBBFAC,25 | Performed by: INTERNAL MEDICINE

## 2018-03-07 PROCEDURE — 96375 TX/PRO/DX INJ NEW DRUG ADDON: CPT

## 2018-03-07 PROCEDURE — 81000 URINALYSIS NONAUTO W/SCOPE: CPT

## 2018-03-07 PROCEDURE — 63600175 PHARM REV CODE 636 W HCPCS: Mod: JG | Performed by: INTERNAL MEDICINE

## 2018-03-07 PROCEDURE — 99999 PR PBB SHADOW E&M-EST. PATIENT-LVL III: CPT | Mod: PBBFAC,,, | Performed by: INTERNAL MEDICINE

## 2018-03-07 PROCEDURE — 96361 HYDRATE IV INFUSION ADD-ON: CPT

## 2018-03-07 PROCEDURE — 63600175 PHARM REV CODE 636 W HCPCS

## 2018-03-07 RX ORDER — CYCLOBENZAPRINE HCL 10 MG
5 TABLET ORAL 3 TIMES DAILY PRN
Qty: 15 TABLET | Refills: 0 | Status: SHIPPED | OUTPATIENT
Start: 2018-03-07 | End: 2019-02-21

## 2018-03-07 RX ORDER — KETOROLAC TROMETHAMINE 30 MG/ML
15 INJECTION, SOLUTION INTRAMUSCULAR; INTRAVENOUS
Status: COMPLETED | OUTPATIENT
Start: 2018-03-07 | End: 2018-03-07

## 2018-03-07 RX ORDER — KETOROLAC TROMETHAMINE 30 MG/ML
INJECTION, SOLUTION INTRAMUSCULAR; INTRAVENOUS
Status: DISCONTINUED
Start: 2018-03-07 | End: 2018-03-08 | Stop reason: HOSPADM

## 2018-03-07 RX ORDER — NAPROXEN 375 MG/1
375 TABLET ORAL 2 TIMES DAILY WITH MEALS
Qty: 14 TABLET | Refills: 0 | Status: SHIPPED | OUTPATIENT
Start: 2018-03-07 | End: 2018-07-30

## 2018-03-07 RX ORDER — ONDANSETRON 2 MG/ML
INJECTION INTRAMUSCULAR; INTRAVENOUS
Status: COMPLETED
Start: 2018-03-07 | End: 2018-03-07

## 2018-03-07 RX ORDER — ONDANSETRON 2 MG/ML
4 INJECTION INTRAMUSCULAR; INTRAVENOUS
Status: COMPLETED | OUTPATIENT
Start: 2018-03-07 | End: 2018-03-07

## 2018-03-07 RX ADMIN — SODIUM CHLORIDE 1000 ML: 0.9 INJECTION, SOLUTION INTRAVENOUS at 10:03

## 2018-03-07 RX ADMIN — ONDANSETRON 4 MG: 2 INJECTION INTRAMUSCULAR; INTRAVENOUS at 10:03

## 2018-03-07 RX ADMIN — DENOSUMAB 120 MG: 120 INJECTION SUBCUTANEOUS at 10:03

## 2018-03-07 RX ADMIN — KETOROLAC TROMETHAMINE 15 MG: 30 INJECTION, SOLUTION INTRAMUSCULAR; INTRAVENOUS at 10:03

## 2018-03-07 NOTE — PROGRESS NOTES
Subjective:       Patient ID: Josey Flores is a 47 y.o. female.    Chief Complaint: Results and Breast Cancer    HPI 47-year-old female with ER/IA positive prostatic breast carcinoma currently on IBRANCE D20/21/ LETROZOLE tolerating therapy well no nausea vomiting fever chills night sweats    Past Medical History:   Diagnosis Date    Anxiety     Asthma     Cancer     right breast, metastatic-lymph nodes, bone, and thyroid     COPD (chronic obstructive pulmonary disease)     Depression     History of psychiatric hospitalization     2000; suicidal ideation    Hx of psychiatric care     Hypothyroidism     Miscarriage     five miscarriages    Obesity     Psychiatric problem     Thyroid cancer 2017     Family History   Problem Relation Age of Onset    Arthritis Mother     Early death Mother      64 at time of death    Heart attack Mother     Heart disease Mother     Heart failure Mother     Hypertension Mother     Coronary artery disease Father     Hearing loss Father     Heart disease Father     Hyperlipidemia Father     Hypertension Father     Mental illness Father     Learning disabilities Son      Social History     Social History    Marital status:      Spouse name: N/A    Number of children: 2    Years of education: N/A     Occupational History    Not on file.     Social History Main Topics    Smoking status: Current Every Day Smoker     Packs/day: 1.00     Years: 30.00     Types: Cigarettes     Last attempt to quit: 6/11/2017    Smokeless tobacco: Never Used      Comment: 45 pack year history    Alcohol use No    Drug use: No    Sexual activity: Yes     Partners: Male     Other Topics Concern    Not on file     Social History Narrative     with two adult children; common law marriage (10+years); raised Nondenominational, no current Confucianism practice     Past Surgical History:   Procedure Laterality Date    ABSCESS DRAINAGE      bilateral axilla    ADENOIDECTOMY       APPENDECTOMY      CHOLECYSTECTOMY      SKIN GRAFT  06/16/2017    THYROIDECTOMY Bilateral     TONSILLECTOMY         Labs:  Lab Results   Component Value Date    WBC 2.63 (L) 03/07/2018    HGB 12.4 03/07/2018    HCT 35.5 (L) 03/07/2018     (H) 03/07/2018     03/07/2018     BMP  Lab Results   Component Value Date     03/07/2018    K 4.5 03/07/2018     03/07/2018    CO2 29 03/07/2018    BUN 10 03/07/2018    CREATININE 1.1 03/07/2018    CALCIUM 9.1 03/07/2018    ANIONGAP 6 (L) 03/07/2018    ESTGFRAFRICA >60 03/07/2018    EGFRNONAA 59 (A) 03/07/2018     Lab Results   Component Value Date    ALT 18 03/07/2018    AST 15 03/07/2018    ALKPHOS 60 03/07/2018    BILITOT 0.5 03/07/2018       No results found for: IRON, TIBC, FERRITIN, SATURATEDIRO  No results found for: [id number]  No results found for: FOLATE  Lab Results   Component Value Date    TSH 2.201 11/29/2017         Review of Systems   Constitutional: Positive for fatigue. Negative for activity change, appetite change, chills, diaphoresis, fever and unexpected weight change.   HENT: Negative for congestion, dental problem, drooling, ear discharge, ear pain, facial swelling, hearing loss, mouth sores, nosebleeds, postnasal drip, rhinorrhea, sinus pressure, sneezing, sore throat, tinnitus, trouble swallowing and voice change.    Eyes: Negative for photophobia, pain, discharge, redness, itching and visual disturbance.   Respiratory: Negative for cough, choking, chest tightness, shortness of breath, wheezing and stridor.    Cardiovascular: Negative for chest pain, palpitations and leg swelling.   Gastrointestinal: Negative for abdominal distention, abdominal pain, anal bleeding, blood in stool, constipation, diarrhea, nausea, rectal pain and vomiting.   Endocrine: Negative for cold intolerance, heat intolerance, polydipsia, polyphagia and polyuria.   Genitourinary: Negative for decreased urine volume, difficulty urinating, dyspareunia,  dysuria, enuresis, flank pain, frequency, genital sores, hematuria, menstrual problem, pelvic pain, urgency, vaginal bleeding, vaginal discharge and vaginal pain.   Musculoskeletal: Negative for arthralgias, back pain, gait problem, joint swelling, myalgias, neck pain and neck stiffness.   Skin: Negative for color change, pallor and rash.   Allergic/Immunologic: Negative for environmental allergies, food allergies and immunocompromised state.   Neurological: Negative for dizziness, tremors, seizures, syncope, facial asymmetry, speech difficulty, weakness, light-headedness, numbness and headaches.   Hematological: Negative for adenopathy. Does not bruise/bleed easily.   Psychiatric/Behavioral: Positive for dysphoric mood. Negative for agitation, behavioral problems, confusion, decreased concentration, hallucinations, self-injury, sleep disturbance and suicidal ideas. The patient is nervous/anxious. The patient is not hyperactive.        Objective:      Physical Exam   Constitutional: She is oriented to person, place, and time. She appears well-developed and well-nourished. She appears distressed.   HENT:   Head: Normocephalic and atraumatic.   Right Ear: External ear normal.   Left Ear: External ear normal.   Nose: Nose normal. Right sinus exhibits no maxillary sinus tenderness and no frontal sinus tenderness. Left sinus exhibits no maxillary sinus tenderness and no frontal sinus tenderness.   Mouth/Throat: Oropharynx is clear and moist. No oropharyngeal exudate.   Eyes: Conjunctivae, EOM and lids are normal. Pupils are equal, round, and reactive to light. Right eye exhibits no discharge. Left eye exhibits no discharge. Right conjunctiva is not injected. Right conjunctiva has no hemorrhage. Left conjunctiva is not injected. Left conjunctiva has no hemorrhage. No scleral icterus.   Neck: Normal range of motion. Neck supple. No JVD present. No tracheal deviation present. No thyromegaly present.   Cardiovascular: Normal  rate and regular rhythm.    Pulmonary/Chest: Effort normal. No stridor. No respiratory distress. She exhibits no tenderness.   Abdominal: Soft. She exhibits no distension and no mass. There is no splenomegaly or hepatomegaly. There is no tenderness. There is no rebound.   Musculoskeletal: Normal range of motion. She exhibits no edema or tenderness.   Lymphadenopathy:     She has no cervical adenopathy.     She has no axillary adenopathy.        Right: No supraclavicular adenopathy present.        Left: No supraclavicular adenopathy present.   Neurological: She is alert and oriented to person, place, and time. No cranial nerve deficit. Coordination normal.   Skin: Skin is dry. No rash noted. She is not diaphoretic. No erythema.   Psychiatric: She has a normal mood and affect. Her behavior is normal. Judgment and thought content normal.   Vitals reviewed.          Assessment:      1. Malignant neoplasm of overlapping sites of right breast in female, estrogen receptor positive    2. Malignant neoplasm metastatic to lymph node of axilla    3. Secondary cancer of bone           Plan:   Continue with current treatment plan next [place] today continue with current dosing of IBRANCE / Letrozole

## 2018-03-07 NOTE — PROGRESS NOTES
SW met with pt briefly after her injection in Infusion. Pt reported she is doing well and has no needs at this time. SW encouraged pt to contact us if needs arise in future. SW will f/u as needed.

## 2018-03-08 NOTE — ED PROVIDER NOTES
SCRIBE #1 NOTE: I, Teto Cespedes, am scribing for, and in the presence of, Bj Harry MD. I have scribed the entire note.      History      Chief Complaint   Patient presents with    Abdominal Pain      RUQ        Review of patient's allergies indicates:   Allergen Reactions    Vancomycin analogues Itching        HPI   HPI    3/7/2018, 9:58 PM   History obtained from the patient      History of Present Illness: Josey Flores is a 47 y.o. female patient who presents to the Emergency Department for an evaluation of right flank pain which onset suddenly today. Pt reports she was in the kitchen when she suddenly began to suffer from a stabbing pain in her right side. Pt reports ahx of kidney stones. Symptoms are constant and moderate in severity. Exacerbated by coughing and lying flat and relieved by nothing. Associated sxs include RUQ abdominal pain, nausea, and urinary urgency. Patient denies any fever, chills, emesis, diarrhea, blood in stool, dysuria, hematuria, HA, weakness, and all other sxs at this time. Pt denies relief from [place]. No further complaints or concerns at this time.     Arrival mode: Personal vehicle     PCP: Aileen Sadler MD       Past Medical History:  Past Medical History:   Diagnosis Date    Anxiety     Asthma     Cancer     right breast, metastatic-lymph nodes, bone, and thyroid     COPD (chronic obstructive pulmonary disease)     Depression     History of psychiatric hospitalization     2000; suicidal ideation    Hx of psychiatric care     Hypothyroidism     Miscarriage     five miscarriages    Obesity     Psychiatric problem     Thyroid cancer 2017       Past Surgical History:  Past Surgical History:   Procedure Laterality Date    ABSCESS DRAINAGE      bilateral axilla    ADENOIDECTOMY      APPENDECTOMY      CHOLECYSTECTOMY      SKIN GRAFT  06/16/2017    THYROIDECTOMY Bilateral     TONSILLECTOMY           Family History:  Family History   Problem Relation Age  of Onset    Arthritis Mother     Early death Mother      64 at time of death    Heart attack Mother     Heart disease Mother     Heart failure Mother     Hypertension Mother     Coronary artery disease Father     Hearing loss Father     Heart disease Father     Hyperlipidemia Father     Hypertension Father     Mental illness Father     Learning disabilities Son        Social History:  Social History     Social History Main Topics    Smoking status: Current Every Day Smoker     Packs/day: 1.00     Years: 30.00     Types: Cigarettes     Last attempt to quit: 6/11/2017    Smokeless tobacco: Never Used      Comment: 45 pack year history    Alcohol use No    Drug use: No    Sexual activity: Yes     Partners: Male       ROS   Review of Systems   Constitutional: Negative for chills and fever.   HENT: Negative for congestion, sore throat and trouble swallowing.    Respiratory: Negative for cough and shortness of breath.    Cardiovascular: Negative for chest pain.   Gastrointestinal: Positive for abdominal pain (RUQ) and nausea. Negative for blood in stool, constipation, diarrhea and vomiting.   Genitourinary: Positive for flank pain (Right) and urgency. Negative for decreased urine volume, difficulty urinating, dysuria, frequency and hematuria.   Musculoskeletal: Negative for back pain.   Skin: Negative for rash.   Neurological: Negative for weakness, light-headedness and headaches.   Hematological: Does not bruise/bleed easily.     Physical Exam      Initial Vitals [03/07/18 2039]   BP Pulse Resp Temp SpO2   115/69 81 20 98.1 °F (36.7 °C) 97 %      MAP       84.33          Physical Exam  Nursing Notes and Vital Signs Reviewed.  Constitutional: Patient is in mild distress. Well-developed and well-nourished.  Head: Atraumatic. Normocephalic.  Eyes: PERRL. EOM intact. Conjunctivae are not pale. No scleral icterus.  ENT: Mucous membranes are moist. Oropharynx is clear and symmetric.    Neck: Supple. Full ROM.  "No lymphadenopathy.  Cardiovascular: Regular rate. Regular rhythm.   Pulmonary/Chest: No respiratory distress. Clear to auscultation bilaterally. No wheezing or rales.  Abdominal: Soft and non-distended.  RLQ tenderness.  No rebound, guarding, or rigidity. Good bowel sounds.  Genitourinary: Right CVA tenderness  Musculoskeletal: Moves all extremities. No obvious deformities. No edema. No calf tenderness.  Skin: Warm and dry.  Neurological:  Alert, awake, and appropriate.  Normal speech.  No acute focal neurological deficits are appreciated.  Psychiatric: Normal affect. Good eye contact. Appropriate in content.    ED Course    Procedures  ED Vital Signs:  Vitals:    03/07/18 2039 03/07/18 2331 03/07/18 2358   BP: 115/69 (!) 106/52    Pulse: 81 72    Resp: 20     Temp: 98.1 °F (36.7 °C)  98.2 °F (36.8 °C)   TempSrc: Oral  Oral   SpO2: 97% 97%    Weight: 98.9 kg (218 lb)     Height: 5' 7" (1.702 m)         Abnormal Lab Results:  Labs Reviewed   URINALYSIS - Abnormal; Notable for the following:        Result Value    Occult Blood UA 2+ (*)     Urobilinogen, UA 4.0-6.0 (*)     All other components within normal limits   COMPREHENSIVE METABOLIC PANEL - Abnormal; Notable for the following:     Potassium 5.5 (*)     CO2 20 (*)     AST 43 (*)     eGFR if non  59 (*)     All other components within normal limits   URINALYSIS MICROSCOPIC - Abnormal; Notable for the following:     RBC, UA 6 (*)     All other components within normal limits   CBC W/ AUTO DIFFERENTIAL - Abnormal; Notable for the following:     WBC 3.01 (*)     RBC 3.27 (*)     Hematocrit 35.4 (*)      (*)     MCH 38.2 (*)     Gran # (ANC) 1.4 (*)     Mono # 0.2 (*)     All other components within normal limits        All Lab Results:  Results for orders placed or performed during the hospital encounter of 03/07/18   Urinalysis - clean catch   Result Value Ref Range    Specimen UA Urine, Clean Catch     Color, UA Yellow Yellow, Straw, [place] "    Appearance, UA Clear Clear    pH, UA 7.0 5.0 - 8.0    Specific Gravity, UA 1.010 1.005 - 1.030    Protein, UA Negative Negative    Glucose, UA Negative Negative    Ketones, UA Negative Negative    Bilirubin (UA) Negative Negative    Occult Blood UA 2+ (A) Negative    Nitrite, UA Negative Negative    Urobilinogen, UA 4.0-6.0 (A) <2.0 EU/dL    Leukocytes, UA Negative Negative   Comprehensive metabolic panel   Result Value Ref Range    Sodium 139 136 - 145 mmol/L    Potassium 5.5 (H) 3.5 - 5.1 mmol/L    Chloride 107 95 - 110 mmol/L    CO2 20 (L) 23 - 29 mmol/L    Glucose 103 70 - 110 mg/dL    BUN, Bld 9 6 - 20 mg/dL    Creatinine 1.1 0.5 - 1.4 mg/dL    Calcium 9.0 8.7 - 10.5 mg/dL    Total Protein 7.9 6.0 - 8.4 g/dL    Albumin 4.1 3.5 - 5.2 g/dL    Total Bilirubin 0.5 0.1 - 1.0 mg/dL    Alkaline Phosphatase 62 55 - 135 U/L    AST 43 (H) 10 - 40 U/L    ALT 26 10 - 44 U/L    Anion Gap 12 8 - 16 mmol/L    eGFR if African American >60 >60 mL/min/1.73 m^2    eGFR if non African American 59 (A) >60 mL/min/1.73 m^2   Urinalysis Microscopic   Result Value Ref Range    RBC, UA 6 (H) 0 - 4 /hpf    Squam Epithel, UA 2 /hpf    Microscopic Comment SEE COMMENT    CBC auto differential   Result Value Ref Range    WBC 3.01 (L) 3.90 - 12.70 K/uL    RBC 3.27 (L) 4.00 - 5.40 M/uL    Hemoglobin 12.5 12.0 - 16.0 g/dL    Hematocrit 35.4 (L) 37.0 - 48.5 %     (H) 82 - 98 fL    MCH 38.2 (H) 27.0 - 31.0 pg    MCHC 35.3 32.0 - 36.0 g/dL    RDW 13.5 11.5 - 14.5 %    Platelets 183 150 - 350 K/uL    MPV 10.6 9.2 - 12.9 fL    Gran # (ANC) 1.4 (L) 1.8 - 7.7 K/uL    Lymph # 1.3 1.0 - 4.8 K/uL    Mono # 0.2 (L) 0.3 - 1.0 K/uL    Eos # 0.0 0.0 - 0.5 K/uL    Baso # 0.02 0.00 - 0.20 K/uL    Gran% 46.9 38.0 - 73.0 %    Lymph% 43.5 18.0 - 48.0 %    Mono% 7.6 4.0 - 15.0 %    Eosinophil% 1.3 0.0 - 8.0 %    Basophil% 0.7 0.0 - 1.9 %    Differential Method Automated          Imaging Results:  Imaging Results          CT Renal Stone Study ABD Pelvis  WO (Final result)  Result time 03/07/18 23:17:38    Final result by Arturo Ge MD (03/07/18 23:17:38)                 Impression:       No radiopaque renal calculus or evidence of urinary tract obstruction.    All CT scans at this facility use dose modulation, iterative reconstruction and/or weight based dosing when appropriate to reduce radiation dose to as low as reasonably achievable.      Electronically signed by: ARTURO GE MD  Date:     03/07/18  Time:    23:17              Narrative:    Indication: Abdominal pain.    Routine noncontrast CT images of the abdomen and pelvis were obtained.    Findings:    The lung bases are well aerated.  Heart size is normal.  No pericardial or pleural effusion.      The liver is normal in size and attenuation on this noncontrast exam.  Gallbladder is surgically absent. Multiple splenic granuloma. The  stomach, spleen, pancreas, adrenal glands are normal.  Aorta demonstrates no significant atherosclerotic disease.      The kidneys are normal in size shape and position without radiopaque calculus or signs of hydronephrosis. The bladder is incompletely distended.     The visualized loops of small bowel are unremarkable.The appendix is not visualized in the right lower quadrant.  There is no inflammation. Colon is unremarkable.      Bones appear intact with mild degenerative changes in stable lytic pelvic bone lesions.                                      The Emergency Provider reviewed the vital signs and test results, which are outlined above.    ED Discussion     11:56 PM: Reassessed pt at this time. Pt is awake, alert, and in NAD. Pt states her condition has improved at this time. Discussed with pt all pertinent ED information and results. Discussed pt dx of abdominal pain and plan of tx. Gave pt all f/u and return to the ED instructions. All questions and concerns were addressed at this time. Pt expresses understanding of information and instructions, and is  comfortable with plan to discharge. Pt is stable for discharge.    I discussed with patient and/or family/caretaker that evaluation in the ED does not suggest any emergent or life threatening medical conditions requiring immediate intervention beyond what was provided in the ED, and I believe patient is safe for discharge.  Regardless, an unremarkable evaluation in the ED does not preclude the development or presence of a serious of life threatening condition. As such, patient was instructed to return immediately for any worsening or change in current symptoms.      ED Medication(s):  Medications   ketorolac injection 15 mg (15 mg Intravenous Given 3/7/18 2214)   ondansetron injection 4 mg (4 mg Intravenous Given 3/7/18 2237)   sodium chloride 0.9% bolus 1,000 mL (0 mLs Intravenous Stopped 3/7/18 5220)       Discharge Medication List as of 3/7/2018 11:58 PM      START taking these medications    Details   cyclobenzaprine (FLEXERIL) 10 MG tablet Take 0.5 tablets (5 mg total) by mouth 3 (three) times daily as needed for Muscle spasms., Starting Wed 3/7/2018, Print      naproxen (NAPROSYN) 375 MG tablet Take 1 tablet (375 mg total) by mouth 2 (two) times daily with meals., Starting Wed 3/7/2018, Print             Follow-up Information     Aileen Sadler MD In 2 days.    Specialty:  Family Medicine  Contact information:  [address]  [place] [id number]             [facility] - .    Specialty:  Emergency Medicine  Why:  As needed, If symptoms worsen  Contact information:  [address]  [facility] [address] [phone]                   Medical Decision Making    Medical Decision Making:   Clinical Tests:   Lab Tests: Ordered and Reviewed  Radiological Study: Ordered and Reviewed           Scribe Attestation:   Scribe #1: I performed the above scribed service and the documentation accurately describes the services I performed. I attest to the accuracy of the  note.    Attending:   Physician Attestation Statement for Scribe #1: I, Bj Harry MD, personally performed the services described in this documentation, as scribed by Teto Cespedes, in my presence, and it is both accurate and complete.          Clinical Impression       ICD-10-CM ICD-9-CM   1. Abdominal pain, unspecified abdominal location R10.9 789.00   2. Hematuria, unspecified type R31.9 599.70       Disposition:   Disposition: Discharged  Condition: Stable         Bj Harry MD  03/08/18 0455

## 2018-03-12 ENCOUNTER — TELEPHONE (OUTPATIENT)
Dept: ENDOCRINOLOGY | Facility: CLINIC | Age: 48
End: 2018-03-12

## 2018-03-12 DIAGNOSIS — Z01.818 PRE-OP TESTING: ICD-10-CM

## 2018-03-12 DIAGNOSIS — J44.9 CHRONIC OBSTRUCTIVE PULMONARY DISEASE, UNSPECIFIED COPD TYPE: Primary | ICD-10-CM

## 2018-03-12 NOTE — PRE-PROCEDURE INSTRUCTIONS
Pre op instructions reviewed with patient per phone:    To confirm, Your surgeon has instructed you:  Surgery is scheduled 3/14/18 at 0830.      Please report to [facility] 1[facility] floor main lobby by 0700   Pre admit office will call afternoon prior to surgery for final arrival time.      INSTRUCTIONS IMPORTANT!!!  ¨ No smoking after 12 midnight, the night before surgery.  ¨ No solid food after 12 midnight, but you may have clear liquids up until 3 hours prior to surgery.  This includes: grape, cranberry, and apple juice (not orange, and no coffee.)   ¨ OK to brush teeth, but no gum, candy or mints!    ¨ Take only these medicines with a small swallow of water-morning of surgery.  Xanax, Wellbutrin, Synthroid    ____  Do not wear makeup, including mascara.  ____  No powder, lotions or creams to surgical area.  ____  Please remove all jewelry, including piercings and leave at home.  ____  No money or valuables needed. Please leave at home.  ____  Please bring identification and insurance information to hospital.  ____  If going home the same day, arrange for a ride home. You will not be able to   drive if Anesthesia was used.  ____  Children, under 12 years old, must remain in the waiting room with an adult.  They are not allowed in patient areas.  ____  Wear loose fitting clothing. Allow for dressings, bandages.  ____  Stop Aspirin, Ibuprofen, Motrin and Aleve at least 5-7 days before surgery, unless otherwise instructed by your doctor, or the nurse.   You MAY use Tylenol/acetaminophen until day of surgery.  ____  If you take diabetic medication, do not take am of surgery unless instructed by   Doctor.  ____ Stop taking any Fish Oil supplement or any Vitamins that contain Vitamin E at least 5 days prior to surgery.          Bathing Instructions-- The night before surgery and the morning prior to coming to the hospital:   -Do not shave the surgical area.   -Shower and wash your hair and body as  usual with your regular soap and shampoo.   -Rinse your hair and body completely.   -Use one packet of hibiclens to wash the surgical site (using your hand) gently for 5 minutes.  Do not scrub you skin too hard.   -Do not use hibiclens on your head, face, or genitals.   -Do not wash with regular soap after you use the hibiclens.   -Rinse your body thoroughly.   -Dry with clean, soft towel.  Do not use lotion, cream, deodorant, or powders on   the surgical site.    Use antibacterial soap in place of hibiclens if your surgery is on the head, face or genitals.         Surgical Site Infection    Prevention of surgical site infections:     -Keep incisions clean and dry.   -Do not soak/submerge incisions in water until completely healed.   -Do not apply lotions, powders, creams, or deodorants to site.   -Always make sure hands are cleaned with antibacterial soap/ alcohol-based   prior to touching the surgical site.  (This includes doctors, nurses, staff, and yourself.)    Signs and symptoms:   -Redness and pain around the area where you had surgery   -Drainage of cloudy fluid from your surgical wound   -Fever over 100.4  I have read or had read and explained to me, and understand the above information.

## 2018-03-13 ENCOUNTER — CLINICAL SUPPORT (OUTPATIENT)
Dept: CARDIOLOGY | Facility: CLINIC | Age: 48
End: 2018-03-13
Payer: MEDICAID

## 2018-03-13 ENCOUNTER — ANESTHESIA EVENT (OUTPATIENT)
Dept: SURGERY | Facility: HOSPITAL | Age: 48
End: 2018-03-13
Payer: MEDICAID

## 2018-03-13 DIAGNOSIS — J44.9 CHRONIC OBSTRUCTIVE PULMONARY DISEASE, UNSPECIFIED COPD TYPE: ICD-10-CM

## 2018-03-13 DIAGNOSIS — Z01.818 PRE-OP TESTING: ICD-10-CM

## 2018-03-13 PROCEDURE — 93010 ELECTROCARDIOGRAM REPORT: CPT | Mod: S$PBB,,, | Performed by: INTERNAL MEDICINE

## 2018-03-13 PROCEDURE — 93005 ELECTROCARDIOGRAM TRACING: CPT | Mod: PBBFAC | Performed by: INTERNAL MEDICINE

## 2018-03-14 ENCOUNTER — SURGERY (OUTPATIENT)
Age: 48
End: 2018-03-14

## 2018-03-14 ENCOUNTER — HOSPITAL ENCOUNTER (OUTPATIENT)
Facility: HOSPITAL | Age: 48
Discharge: HOME | End: 2018-03-14
Attending: SURGERY | Admitting: SURGERY
Payer: MEDICAID

## 2018-03-14 ENCOUNTER — ANESTHESIA (OUTPATIENT)
Dept: SURGERY | Facility: HOSPITAL | Age: 48
End: 2018-03-14
Payer: MEDICAID

## 2018-03-14 VITALS
HEART RATE: 75 BPM | WEIGHT: 216.94 LBS | DIASTOLIC BLOOD PRESSURE: 64 MMHG | RESPIRATION RATE: 11 BRPM | OXYGEN SATURATION: 97 % | SYSTOLIC BLOOD PRESSURE: 111 MMHG | HEIGHT: 67 IN | TEMPERATURE: 98 F | BODY MASS INDEX: 34.05 KG/M2

## 2018-03-14 DIAGNOSIS — C79.51 SECONDARY CANCER OF BONE: ICD-10-CM

## 2018-03-14 DIAGNOSIS — R22.9 SUBCUTANEOUS MASS: ICD-10-CM

## 2018-03-14 DIAGNOSIS — Z17.0 MALIGNANT NEOPLASM OF OVERLAPPING SITES OF RIGHT BREAST IN FEMALE, ESTROGEN RECEPTOR POSITIVE: ICD-10-CM

## 2018-03-14 DIAGNOSIS — C77.3 MALIGNANT NEOPLASM METASTATIC TO LYMPH NODE OF AXILLA: ICD-10-CM

## 2018-03-14 DIAGNOSIS — G89.3 NEOPLASM RELATED PAIN: ICD-10-CM

## 2018-03-14 DIAGNOSIS — C50.811 MALIGNANT NEOPLASM OF OVERLAPPING SITES OF RIGHT BREAST IN FEMALE, ESTROGEN RECEPTOR POSITIVE: ICD-10-CM

## 2018-03-14 PROCEDURE — 71000033 HC RECOVERY, INTIAL HOUR: Performed by: SURGERY

## 2018-03-14 PROCEDURE — C1729 CATH, DRAINAGE: HCPCS | Performed by: SURGERY

## 2018-03-14 PROCEDURE — 25000003 PHARM REV CODE 250: Performed by: NURSE ANESTHETIST, CERTIFIED REGISTERED

## 2018-03-14 PROCEDURE — 11404 EXC TR-EXT B9+MARG 3.1-4 CM: CPT | Mod: 51,RT,, | Performed by: SURGERY

## 2018-03-14 PROCEDURE — 36000707: Performed by: SURGERY

## 2018-03-14 PROCEDURE — 36000706: Performed by: SURGERY

## 2018-03-14 PROCEDURE — 00400 ANES INTEGUMENTARY SYS NOS: CPT | Performed by: SURGERY

## 2018-03-14 PROCEDURE — 12032 INTMD RPR S/A/T/EXT 2.6-7.5: CPT | Mod: ,,, | Performed by: SURGERY

## 2018-03-14 PROCEDURE — 63600175 PHARM REV CODE 636 W HCPCS: Performed by: ANESTHESIOLOGY

## 2018-03-14 PROCEDURE — 25000003 PHARM REV CODE 250: Performed by: SURGERY

## 2018-03-14 PROCEDURE — 88304 TISSUE EXAM BY PATHOLOGIST: CPT | Performed by: PATHOLOGY

## 2018-03-14 PROCEDURE — 63600175 PHARM REV CODE 636 W HCPCS: Performed by: SURGERY

## 2018-03-14 PROCEDURE — 37000009 HC ANESTHESIA EA ADD 15 MINS: Performed by: SURGERY

## 2018-03-14 PROCEDURE — 63600175 PHARM REV CODE 636 W HCPCS: Performed by: NURSE ANESTHETIST, CERTIFIED REGISTERED

## 2018-03-14 PROCEDURE — 88304 TISSUE EXAM BY PATHOLOGIST: CPT | Mod: 26,,, | Performed by: PATHOLOGY

## 2018-03-14 PROCEDURE — 37000008 HC ANESTHESIA 1ST 15 MINUTES: Performed by: SURGERY

## 2018-03-14 PROCEDURE — 71000015 HC POSTOP RECOV 1ST HR: Performed by: SURGERY

## 2018-03-14 RX ORDER — MEPERIDINE HYDROCHLORIDE 50 MG/ML
12.5 INJECTION INTRAMUSCULAR; INTRAVENOUS; SUBCUTANEOUS ONCE AS NEEDED
Status: DISCONTINUED | OUTPATIENT
Start: 2018-03-14 | End: 2018-03-14 | Stop reason: HOSPADM

## 2018-03-14 RX ORDER — BUPIVACAINE HYDROCHLORIDE 2.5 MG/ML
INJECTION, SOLUTION EPIDURAL; INFILTRATION; INTRACAUDAL
Status: DISCONTINUED | OUTPATIENT
Start: 2018-03-14 | End: 2018-03-14 | Stop reason: HOSPADM

## 2018-03-14 RX ORDER — METOCLOPRAMIDE HYDROCHLORIDE 5 MG/ML
10 INJECTION INTRAMUSCULAR; INTRAVENOUS EVERY 10 MIN PRN
Status: DISCONTINUED | OUTPATIENT
Start: 2018-03-14 | End: 2018-03-14 | Stop reason: HOSPADM

## 2018-03-14 RX ORDER — LIDOCAINE HCL/PF 100 MG/5ML
SYRINGE (ML) INTRAVENOUS
Status: DISCONTINUED | OUTPATIENT
Start: 2018-03-14 | End: 2018-03-14

## 2018-03-14 RX ORDER — ONDANSETRON 2 MG/ML
INJECTION INTRAMUSCULAR; INTRAVENOUS
Status: DISCONTINUED | OUTPATIENT
Start: 2018-03-14 | End: 2018-03-14

## 2018-03-14 RX ORDER — HYDROMORPHONE HYDROCHLORIDE 1 MG/ML
1 INJECTION, SOLUTION INTRAMUSCULAR; INTRAVENOUS; SUBCUTANEOUS EVERY 4 HOURS PRN
Status: DISCONTINUED | OUTPATIENT
Start: 2018-03-14 | End: 2018-03-14 | Stop reason: HOSPADM

## 2018-03-14 RX ORDER — SODIUM CHLORIDE 0.9 % (FLUSH) 0.9 %
3 SYRINGE (ML) INJECTION EVERY 8 HOURS
Status: DISCONTINUED | OUTPATIENT
Start: 2018-03-14 | End: 2018-03-14 | Stop reason: HOSPADM

## 2018-03-14 RX ORDER — BACITRACIN ZINC 500 UNIT/G
OINTMENT (GRAM) TOPICAL
Status: DISCONTINUED | OUTPATIENT
Start: 2018-03-14 | End: 2018-03-14 | Stop reason: HOSPADM

## 2018-03-14 RX ORDER — MIDAZOLAM HYDROCHLORIDE 1 MG/ML
INJECTION, SOLUTION INTRAMUSCULAR; INTRAVENOUS
Status: DISCONTINUED | OUTPATIENT
Start: 2018-03-14 | End: 2018-03-14

## 2018-03-14 RX ORDER — MORPHINE SULFATE 4 MG/ML
2 INJECTION, SOLUTION INTRAMUSCULAR; INTRAVENOUS EVERY 5 MIN PRN
Status: DISCONTINUED | OUTPATIENT
Start: 2018-03-14 | End: 2018-03-14 | Stop reason: HOSPADM

## 2018-03-14 RX ORDER — HYDROCODONE BITARTRATE AND ACETAMINOPHEN 7.5; 325 MG/1; MG/1
1 TABLET ORAL EVERY 6 HOURS PRN
Qty: 30 TABLET | Refills: 0 | Status: SHIPPED | OUTPATIENT
Start: 2018-03-14 | End: 2018-03-26 | Stop reason: SDUPTHER

## 2018-03-14 RX ORDER — HYDROCODONE BITARTRATE AND ACETAMINOPHEN 10; 325 MG/1; MG/1
1 TABLET ORAL EVERY 6 HOURS PRN
Status: DISCONTINUED | OUTPATIENT
Start: 2018-03-14 | End: 2018-03-14 | Stop reason: HOSPADM

## 2018-03-14 RX ORDER — ROCURONIUM BROMIDE 10 MG/ML
INJECTION, SOLUTION INTRAVENOUS
Status: DISCONTINUED | OUTPATIENT
Start: 2018-03-14 | End: 2018-03-14

## 2018-03-14 RX ORDER — HYDROCODONE BITARTRATE AND ACETAMINOPHEN 7.5; 325 MG/1; MG/1
1 TABLET ORAL EVERY 4 HOURS PRN
Status: DISCONTINUED | OUTPATIENT
Start: 2018-03-14 | End: 2018-03-14 | Stop reason: HOSPADM

## 2018-03-14 RX ORDER — OXYCODONE HYDROCHLORIDE 5 MG/1
5 TABLET ORAL
Status: DISCONTINUED | OUTPATIENT
Start: 2018-03-14 | End: 2018-03-14 | Stop reason: HOSPADM

## 2018-03-14 RX ORDER — LIDOCAINE HYDROCHLORIDE 10 MG/ML
1 INJECTION, SOLUTION EPIDURAL; INFILTRATION; INTRACAUDAL; PERINEURAL ONCE
Status: DISCONTINUED | OUTPATIENT
Start: 2018-03-14 | End: 2018-03-14 | Stop reason: HOSPADM

## 2018-03-14 RX ORDER — ONDANSETRON 8 MG/1
8 TABLET, ORALLY DISINTEGRATING ORAL EVERY 8 HOURS PRN
Status: DISCONTINUED | OUTPATIENT
Start: 2018-03-14 | End: 2018-03-14 | Stop reason: HOSPADM

## 2018-03-14 RX ORDER — GLYCOPYRROLATE 0.2 MG/ML
INJECTION INTRAMUSCULAR; INTRAVENOUS
Status: DISCONTINUED | OUTPATIENT
Start: 2018-03-14 | End: 2018-03-14

## 2018-03-14 RX ORDER — NEOSTIGMINE METHYLSULFATE 1 MG/ML
INJECTION, SOLUTION INTRAVENOUS
Status: DISCONTINUED | OUTPATIENT
Start: 2018-03-14 | End: 2018-03-14

## 2018-03-14 RX ORDER — PROPOFOL 10 MG/ML
VIAL (ML) INTRAVENOUS
Status: DISCONTINUED | OUTPATIENT
Start: 2018-03-14 | End: 2018-03-14

## 2018-03-14 RX ORDER — FENTANYL CITRATE 50 UG/ML
INJECTION, SOLUTION INTRAMUSCULAR; INTRAVENOUS
Status: DISCONTINUED | OUTPATIENT
Start: 2018-03-14 | End: 2018-03-14

## 2018-03-14 RX ORDER — CEFAZOLIN SODIUM 2 G/50ML
2 SOLUTION INTRAVENOUS
Status: COMPLETED | OUTPATIENT
Start: 2018-03-14 | End: 2018-03-14

## 2018-03-14 RX ORDER — SODIUM CHLORIDE 0.9 % (FLUSH) 0.9 %
3 SYRINGE (ML) INJECTION
Status: DISCONTINUED | OUTPATIENT
Start: 2018-03-14 | End: 2018-03-14 | Stop reason: HOSPADM

## 2018-03-14 RX ADMIN — BUPIVACAINE HYDROCHLORIDE 20 ML: 2.5 INJECTION, SOLUTION EPIDURAL; INFILTRATION; INTRACAUDAL; PERINEURAL at 09:03

## 2018-03-14 RX ADMIN — MORPHINE SULFATE 2 MG: 4 INJECTION INTRAVENOUS at 10:03

## 2018-03-14 RX ADMIN — MIDAZOLAM 2 MG: 1 INJECTION INTRAMUSCULAR; INTRAVENOUS at 08:03

## 2018-03-14 RX ADMIN — MORPHINE SULFATE 2 MG: 4 INJECTION INTRAVENOUS at 09:03

## 2018-03-14 RX ADMIN — NEOSTIGMINE METHYLSULFATE 3 MG: 1 INJECTION INTRAVENOUS at 09:03

## 2018-03-14 RX ADMIN — HYDROCODONE BITARTRATE AND ACETAMINOPHEN 1 TABLET: 10; 325 TABLET ORAL at 10:03

## 2018-03-14 RX ADMIN — BACITRACIN ZINC 1 TUBE: 500 OINTMENT TOPICAL at 09:03

## 2018-03-14 RX ADMIN — CEFAZOLIN SODIUM 2 G: 2 SOLUTION INTRAVENOUS at 08:03

## 2018-03-14 RX ADMIN — LIDOCAINE HYDROCHLORIDE 80 MG: 20 INJECTION, SOLUTION INTRAVENOUS at 08:03

## 2018-03-14 RX ADMIN — ONDANSETRON 4 MG: 2 INJECTION, SOLUTION INTRAMUSCULAR; INTRAVENOUS at 09:03

## 2018-03-14 RX ADMIN — FENTANYL CITRATE 100 MCG: 50 INJECTION, SOLUTION INTRAMUSCULAR; INTRAVENOUS at 08:03

## 2018-03-14 RX ADMIN — ROBINUL 0.4 MG: 0.2 INJECTION INTRAMUSCULAR; INTRAVENOUS at 09:03

## 2018-03-14 RX ADMIN — PROPOFOL 150 MG: 10 INJECTION, EMULSION INTRAVENOUS at 08:03

## 2018-03-14 RX ADMIN — ROCURONIUM BROMIDE 40 MG: 10 INJECTION, SOLUTION INTRAVENOUS at 08:03

## 2018-03-14 NOTE — INTERVAL H&P NOTE
The patient has been examined and the H&P has been reviewed:    I concur with the findings and no changes have occurred since H&P was written.    Anesthesia/Surgery risks, benefits and alternative options discussed and understood by patient/family.          Active Hospital Problems    Diagnosis  POA    Subcutaneous mass [R22.9]  Yes      Resolved Hospital Problems    Diagnosis Date Resolved POA   No resolved problems to display.

## 2018-03-14 NOTE — TRANSFER OF CARE
"Anesthesia Transfer of Care Note    Patient: Josey Flores    Procedure(s) Performed: Procedure(s) (LRB):  EXCISION-MASS-ARM (Right)    Patient location: PACU    Anesthesia Type: general    Transport from OR: Transported from OR on room air with adequate spontaneous ventilation    Post pain: adequate analgesia    Post assessment: no apparent anesthetic complications    Post vital signs: stable    Level of consciousness: awake    Nausea/Vomiting: no nausea/vomiting    Complications: none    Transfer of care protocol was followed      Last vitals:   Visit Vitals  /68 (BP Location: Right arm, Patient Position: Sitting)   Pulse 91   Temp 36.6 °C (97.9 °F) (Temporal)   Resp 18   Ht 5' 7" (1.702 m)   Wt 98.4 kg (216 lb 14.9 oz)   LMP  (LMP Unknown)   SpO2 98%   Breastfeeding? No   BMI 33.98 kg/m²     "

## 2018-03-14 NOTE — ANESTHESIA RELEASE NOTE
"Anesthesia Release from PACU Note    Patient: Josey Flores    Procedure(s) Performed: Procedure(s) (LRB):  EXCISION-MASS-ARM (Right)    Anesthesia type: general    Post pain: Adequate analgesia    Post assessment: no apparent anesthetic complications    Last Vitals:   Visit Vitals  /68 (BP Location: Right arm, Patient Position: Sitting)   Pulse 91   Temp 36.6 °C (97.9 °F) (Temporal)   Resp 18   Ht 5' 7" (1.702 m)   Wt 98.4 kg (216 lb 14.9 oz)   LMP  (LMP Unknown)   SpO2 98%   Breastfeeding? No   BMI 33.98 kg/m²       Post vital signs: stable    Level of consciousness: awake    Nausea/Vomiting: no nausea/no vomiting    Complications: none    Airway Patency: patent    Respiratory: unassisted    Cardiovascular: stable and blood pressure at baseline    Hydration: euvolemic  "

## 2018-03-14 NOTE — ANESTHESIA POSTPROCEDURE EVALUATION
"Anesthesia Post Evaluation    Patient: Josey Flores    Procedure(s) Performed: Procedure(s) (LRB):  EXCISION-MASS-ARM (Right)    Final Anesthesia Type: general  Patient location during evaluation: PACU  Patient participation: Yes- Able to Participate  Level of consciousness: awake and alert  Post-procedure vital signs: reviewed and stable  Pain management: adequate  Airway patency: patent  PONV status at discharge: No PONV  Anesthetic complications: no      Cardiovascular status: blood pressure returned to baseline  Respiratory status: unassisted  Hydration status: euvolemic  Follow-up not needed.        Visit Vitals  /64   Pulse 75   Temp 36.7 °C (98.1 °F) (Temporal)   Resp 11   Ht 5' 7" (1.702 m)   Wt 98.4 kg (216 lb 14.9 oz)   LMP  (LMP Unknown)   SpO2 97%   Breastfeeding? No   BMI 33.98 kg/m²       Pain/Shraddha Score: Pain Assessment Performed: Yes (3/14/2018 10:15 AM)  Presence of Pain: complains of pain/discomfort (3/14/2018 10:33 AM)  Pain Rating Prior to Med Admin: 6 (3/14/2018 10:21 AM)  Shraddha Score: 10 (3/14/2018 10:33 AM)      "

## 2018-03-14 NOTE — ANESTHESIA PREPROCEDURE EVALUATION
03/14/2018  Josey Flores is a 47 y.o., female.    Anesthesia Evaluation    I have reviewed the Patient Summary Reports.    I have reviewed the Nursing Notes.   I have reviewed the Medications.     Review of Systems  Anesthesia Hx:  No problems with previous Anesthesia    Social:  Smoker    Cardiovascular:   ECG has been reviewed.    Pulmonary:   COPD  Chronic Obstructive Pulmonary Disease (COPD): Inhaler use is rescue inhaler PRN.    Endocrine:   Hypothyroidism  Thyroid Disease Hypothyroidism  Metabolic Disorders, Obesity / BMI > 30  Psych:   Psychiatric History anxiety          Physical Exam  General:  Obesity    Airway/Jaw/Neck:  Airway Findings: Mouth Opening: Small, but > 3cm Tongue: Normal  General Airway Assessment: Adult  Mallampati: III  Improves to III with phonation.  TM Distance: 4 - 6 cm      Dental:  Dental Findings: Edentulous   Chest/Lungs:  Chest/Lungs Findings: Clear to auscultation     Heart/Vascular:  Heart Findings: Rate: Normal             Anesthesia Plan  Type of Anesthesia, risks & benefits discussed:  Anesthesia Type:  general, MAC  Patient's Preference:   Intra-op Monitoring Plan: standard ASA monitors  Intra-op Monitoring Plan Comments:   Post Op Pain Control Plan:   Post Op Pain Control Plan Comments:   Induction:   IV  Beta Blocker:  Patient is not currently on a Beta-Blocker (No further documentation required).       Informed Consent: Patient understands risks and agrees with Anesthesia plan.  Questions answered. Anesthesia consent signed with patient.  ASA Score: 3     Day of Surgery Review of History & Physical: I have interviewed and examined the patient. I have reviewed the patient's H&P dated:  There are no significant changes.          Ready For Surgery From Anesthesia Perspective.

## 2018-03-14 NOTE — H&P (VIEW-ONLY)
Patient ID: Josey Flores is a 47 y.o. female.    Right elbow area mass    Chief Complaint: No chief complaint on file.      HPI:  She has had a mass at the medial aspect of her right arm at the elbow.  Is slowly enlarged.  His becoming problematic.  It causes discomfort when she leans against it or is an elbow on her surface.  She also describes a small mass of the upper arm on the right        Has a history of metastatic breast cancer that's being treated with Ibrace.  She has also had surgery for hidradenitis        Review of Systems   Constitutional: Negative for appetite change, chills, fatigue, fever and unexpected weight change.   HENT: Negative for hearing loss and rhinorrhea.    Eyes: Negative for visual disturbance.   Respiratory: Negative for apnea, cough, shortness of breath and wheezing.    Cardiovascular: Negative for chest pain and palpitations.   Gastrointestinal: Negative for abdominal distention, abdominal pain, blood in stool, constipation, diarrhea, nausea and vomiting.   Genitourinary: Negative for dysuria, frequency and urgency.   Musculoskeletal: Negative for arthralgias and neck pain.   Skin: Negative for rash.        Mass at the medial partition of the right elbow and a second mass in the right upper arm   Neurological: Negative for seizures, weakness, numbness and headaches.   Hematological: Negative for adenopathy. Does not bruise/bleed easily.   Psychiatric/Behavioral: Negative for hallucinations. The patient is not nervous/anxious.        Current Outpatient Prescriptions   Medication Sig Dispense Refill    albuterol 90 mcg/actuation inhaler Inhale 2 puffs into the lungs every 6 (six) hours. 1 Inhaler 12    ALPRAZolam (XANAX) 0.5 MG tablet Take 1 tablet (0.5 mg total) by mouth 2 (two) times daily as needed for Insomnia or Anxiety. 60 tablet 0    buPROPion (WELLBUTRIN) 100 MG tablet Take 1 tablet (100 mg total) by mouth 2 (two) times daily. 60 tablet 11    calcium carbonate 400 mg  calcium (1,000 mg) Chew Take 2,000 mg by mouth once daily.      citalopram (CELEXA) 20 MG tablet Take 1 tablet (20 mg total) by mouth once daily. (Patient taking differently: Take 20 mg by mouth every evening. ) 30 tablet 2    ergocalciferol (VITAMIN D2) 50,000 unit Cap Take 5,000 Units by mouth once daily at 6am.       furosemide (LASIX) 20 MG tablet Take 1 tablet (20 mg total) by mouth once daily. 30 tablet 1    hydrocodone-acetaminophen 7.5-325mg (NORCO) 7.5-325 mg per tablet Take 1 tablet by mouth every 6 (six) hours as needed for Pain. 120 tablet 0    IBRANCE 125 mg Cap TAKE 1 CAPSULE (125 MG) DAILY FOR 21 DAYS, FOLLOWED BY 7 DAY REST PERIOD TO COMPLETE A 28 DAY TREATMENT CYCLE 21 capsule 2    letrozole (FEMARA) 2.5 mg Tab Take 1 tablet (2.5 mg total) by mouth once daily. 90 tablet 1    levothyroxine (SYNTHROID) 137 MCG Tab tablet Take 1 tablet (137 mcg total) by mouth before breakfast. 30 tablet 3    multivitamin (THERAGRAN) per tablet Take 1 tablet by mouth once daily.      ondansetron (ZOFRAN) 8 MG tablet Take 1 tablet (8 mg total) by mouth every 12 (twelve) hours as needed for Nausea. 30 tablet 2    potassium chloride SA (K-DUR,KLOR-CON) 20 MEQ tablet Take 1 tablet (20 mEq total) by mouth once daily. 30 tablet 1    prochlorperazine (COMPAZINE) 5 MG tablet Take 1 tablet (5 mg total) by mouth every 6 (six) hours as needed for Nausea. 30 tablet 1     No current facility-administered medications for this visit.        Review of patient's allergies indicates:   Allergen Reactions    Vancomycin analogues Itching       Past Medical History:   Diagnosis Date    Anxiety     Asthma     Cancer     right breast, metastatic-lymph nodes, bone, and thyroid     COPD (chronic obstructive pulmonary disease)     Depression     History of psychiatric hospitalization     2000; suicidal ideation    Hx of psychiatric care     Hypothyroidism     Miscarriage     five miscarriages    Obesity     Psychiatric  problem     Thyroid cancer 2017       Past Surgical History:   Procedure Laterality Date    ABSCESS DRAINAGE      bilateral axilla    ADENOIDECTOMY      APPENDECTOMY      CHOLECYSTECTOMY      SKIN GRAFT  06/16/2017    THYROIDECTOMY Bilateral     TONSILLECTOMY         Family History   Problem Relation Age of Onset    Arthritis Mother     Early death Mother      64 at time of death    Heart attack Mother     Heart disease Mother     Heart failure Mother     Hypertension Mother     Coronary artery disease Father     Hearing loss Father     Heart disease Father     Hyperlipidemia Father     Hypertension Father     Mental illness Father     Learning disabilities Son        Social History     Social History    Marital status:      Spouse name: N/A    Number of children: 2    Years of education: N/A     Occupational History    Not on file.     Social History Main Topics    Smoking status: Current Every Day Smoker     Packs/day: 1.00     Years: 30.00     Types: Cigarettes     Last attempt to quit: 6/11/2017    Smokeless tobacco: Never Used      Comment: 45 pack year history    Alcohol use No    Drug use: No    Sexual activity: Yes     Partners: Male     Other Topics Concern    Not on file     Social History Narrative     with two adult children; common law marriage (10+years); raised [employer], no current Worship practice       There were no vitals filed for this visit.    Physical Exam   Constitutional: She is oriented to person, place, and time. She appears well-developed and well-nourished.   HENT:   Head: Normocephalic.   Neck:   Healed anterior neck incision   Cardiovascular: Normal rate, regular rhythm and normal heart sounds.    Pulmonary/Chest: Breath sounds normal. She has no wheezes.   Abdominal: Soft. Bowel sounds are normal. She exhibits no distension and no mass. There is no hepatosplenomegaly. There is no tenderness. There is no rigidity, no rebound and no  guarding.   Musculoskeletal: Normal range of motion. She exhibits no edema.   Neurological: She is oriented to person, place, and time.   Skin: Skin is warm and dry. No rash noted. No erythema.   On the upper arm there is a 1 cm firm mobile mass with a punctate skin opening.    On the medial aspect of the right arm near the elbow there is a 3 x 4 cm firm mobile mass   Psychiatric: She has a normal mood and affect.   Vitals reviewed.      EBC and CMP from 2/7/18 were reviewedc  Assessment & Plan:    1.  Right arm mass, medially at the level of the elbow  2.  Sebaceous cyst.    Excision in the operating room.  The risks include infection, bleeding and seroma formation.    Consent was obtained.    She has had labs on 2/7/14 so no additional studies are needed

## 2018-03-14 NOTE — DISCHARGE INSTRUCTIONS
Please call for any fever, increase in pain, nausea or vomiting or redness or drainage from incision(s).    Cover the sutures with antibiotic ointment and dry gauze or a large Band-Aid for the elbow incision and a small Band-Aid for the arm incision.    Starting on Friday is okay to shower and get the area wet.      If you become constipated from the pain medication you can use over the counter laxatives,  Miralax or Glycolax, or Magnesium Citrate for severe constipation.        General Information:    1. Do not drink alcoholic beverages including beer for 24 hours or as long as you are on pain medication..  2. Do not drive a motor vehicle, operate machinery or power tools, or signs legal papers for 24 hours or as long as you are on pain medication.   3. You may experience light-headedness, dizziness, and sleepiness following surgery. Please do not stay alone. A responsible adult should be with you for this 24 hour period.  4. Go home and rest.  5. Progress slowly to a normal diet unless instructed.  Otherwise, begin with liquids such as soft drinks, then soup and crackers working up to solid foods. Drink plenty of nonalcoholic fluids.  6. Certain anesthetics and pain medications produce nausea and vomiting in certain individuals. If nausea becomes a problem at home, call you doctor.  7. A nurse will be calling you sometime after surgery. Do not be alarmed. This is our way of finding out how you are doing.  8. Several times every hour while you are awake, take 2-3 deep breaths and cough. If you had stomach surgery hold a pillow or rolled towel firmly against your stomach before you cough. This will help with any pain the cough might cause.  9. Several times every hour while you are awake, pump and flex your feet 5-6 times and do foot circles. This will help prevent blood clots.  10. Call your doctor for severe pain, bleeding, fever, or signs or symptoms of infection (pain, swelling, redness, foul odor,  drainage).  11. You can contact your doctor anytime by [phone] for the [facility] (at [facility]) or [phone] for the [facility] on [facility].   [email or web address] is another way to contact your doctor if you are an active participant online with [facility].  12. Continue Nozin provided at discharge twice daily for 7 days or until the incision is healed.  See pamphlet or box for instructions.

## 2018-03-14 NOTE — OP NOTE
Operative Note       Surgery Date: 3/14/2018     Surgeon(s) and Role:     * Rodger Wahl MD - Primary    Pre-op Diagnosis:  Subcutaneous mass [R22.9]    Post-op Diagnosis: Post-Op Diagnosis Codes:     * Subcutaneous mass [R22.9]    Procedure(s) (LRB):  EXCISION-MASS-ARM (Right)    Anesthesia: General    Procedure in Detail/Findings:    She is brought into the operative room placed on the operating table in the supine position.  General endotracheal anesthesia was induced.  IV antibiotics were administered.  Pneumatic compression devices placed on lower jasson.  The right arm was prepped and draped a standard fashion.    Timeout was performed.    A first incision was made over the area of the medial elbow mass.  After opening the subcutaneous tissues we encountered a cyst capsule and the caseous material within the epidermal inclusion cyst.  We then excised the cyst from the underlying subcutaneous tissue.  A rim of skin was removed in the process.    The wound was irrigated.  Marcaine was infiltrated.  The deep layers were closed with 3-0 Vicryl.  The skin was closed with 2-0 nylon vertical mattress fashion.    An elliptical incision was then made over the cyst at the upper arm.  The cyst capsule was identified and excised.  The wound was irrigated.  Marcaine was infiltrated.  The skin and subcutaneous tissues were closed with 3-0 nylon vertical mattress fashion.      The buttock ointment and a dry sterile dressing was placed    Estimated Blood Loss: * No values recorded between 3/14/2018  8:47 AM and 3/14/2018  9:29 AM *           Specimens     Start     Ordered    03/14/18 0906  Specimen to Pathology - Surgery  Once      03/14/18 0919        Implants: * No implants in log *           Disposition: PACU - hemodynamically stable.           Condition: Stable    Attestation:  I performed the procedure.           Discharge Note    Admit Date: 3/14/2018    Attending Physician: Rodger Wahl MD     Discharge  Physician: Rodger Wahl MD    Final Diagnosis: Post-Op Diagnosis Codes:     * Subcutaneous mass [R22.9]    Disposition: Home or Self Care    Patient Instructions:   Current Discharge Medication List      CONTINUE these medications which have CHANGED    Details   hydrocodone-acetaminophen 7.5-325mg (NORCO) 7.5-325 mg per tablet Take 1 tablet by mouth every 6 (six) hours as needed for Pain.  Qty: 30 tablet, Refills: 0    Associated Diagnoses: Malignant neoplasm of overlapping sites of right breast in female, estrogen receptor positive; Malignant neoplasm metastatic to lymph node of axilla; Secondary cancer of bone; Neoplasm related pain         CONTINUE these medications which have NOT CHANGED    Details   albuterol 90 mcg/actuation inhaler Inhale 2 puffs into the lungs every 6 (six) hours.  Qty: 1 Inhaler, Refills: 12    Associated Diagnoses: Chronic obstructive pulmonary disease, unspecified COPD type      ALPRAZolam (XANAX) 0.5 MG tablet Take 1 tablet (0.5 mg total) by mouth 2 (two) times daily as needed for Insomnia or Anxiety.  Qty: 60 tablet, Refills: 0    Associated Diagnoses: Anxiety      buPROPion (WELLBUTRIN) 100 MG tablet Take 1 tablet (100 mg total) by mouth 2 (two) times daily.  Qty: 60 tablet, Refills: 11    Associated Diagnoses: Depression, unspecified depression type      calcium carbonate 400 mg calcium (1,000 mg) Chew Take 2,000 mg by mouth once daily.      citalopram (CELEXA) 20 MG tablet Take 1 tablet (20 mg total) by mouth once daily.  Qty: 30 tablet, Refills: 2    Associated Diagnoses: Depression, unspecified depression type; Malignant neoplasm of overlapping sites of right female breast; Malignant neoplasm metastatic to lymph node of axilla; Secondary cancer of bone; Anxiety; Neoplasm related pain      cyclobenzaprine (FLEXERIL) 10 MG tablet Take 0.5 tablets (5 mg total) by mouth 3 (three) times daily as needed for Muscle spasms.  Qty: 15 tablet, Refills: 0      ergocalciferol (VITAMIN D2)  50,000 unit Cap Take 5,000 Units by mouth once daily at 6am.       furosemide (LASIX) 20 MG tablet Take 1 tablet (20 mg total) by mouth once daily.  Qty: 30 tablet, Refills: 1    Associated Diagnoses: Secondary cancer of bone; Malignant neoplasm metastatic to lymph node of axilla; Hidradenitis; Edema extremities      IBRANCE 125 mg Cap TAKE 1 CAPSULE (125 MG) DAILY FOR 21 DAYS, FOLLOWED BY 7 DAY REST PERIOD TO COMPLETE A 28 DAY TREATMENT CYCLE  Qty: 21 capsule, Refills: 2    Associated Diagnoses: Malignant neoplasm of overlapping sites of right breast in female, estrogen receptor positive; Malignant neoplasm metastatic to lymph node of axilla; Secondary cancer of bone; Neoplasm related pain      letrozole (FEMARA) 2.5 mg Tab Take 1 tablet (2.5 mg total) by mouth once daily.  Qty: 90 tablet, Refills: 1    Associated Diagnoses: Malignant neoplasm of overlapping sites of right breast in female, estrogen receptor positive; Malignant neoplasm metastatic to lymph node of axilla; Secondary cancer of bone; Neoplasm related pain; Anxiety; Depression, unspecified depression type      levothyroxine (SYNTHROID) 137 MCG Tab tablet Take 1 tablet (137 mcg total) by mouth before breakfast.  Qty: 30 tablet, Refills: 3      multivitamin (THERAGRAN) per tablet Take 1 tablet by mouth once daily.      potassium chloride SA (K-DUR,KLOR-CON) 20 MEQ tablet Take 1 tablet (20 mEq total) by mouth once daily.  Qty: 30 tablet, Refills: 1    Associated Diagnoses: Secondary cancer of bone; Malignant neoplasm metastatic to lymph node of axilla; Hidradenitis; Edema extremities      naproxen (NAPROSYN) 375 MG tablet Take 1 tablet (375 mg total) by mouth 2 (two) times daily with meals.  Qty: 14 tablet, Refills: 0      ondansetron (ZOFRAN) 8 MG tablet Take 1 tablet (8 mg total) by mouth every 12 (twelve) hours as needed for Nausea.  Qty: 30 tablet, Refills: 2    Associated Diagnoses: Malignant neoplasm of overlapping sites of right breast in female,  estrogen receptor positive; Nausea      prochlorperazine (COMPAZINE) 5 MG tablet Take 1 tablet (5 mg total) by mouth every 6 (six) hours as needed for Nausea.  Qty: 30 tablet, Refills: 1    Associated Diagnoses: Malignant neoplasm of overlapping sites of right breast in female, estrogen receptor positive; Nausea             Discharge Procedure Orders (must include Diet, Follow-up, Activity)    Discharge Procedure Orders (must include Diet, Follow-up, Activity)  Diet general     Call MD for:  temperature >100.4     Call MD for:  persistent nausea and vomiting     Call MD for:  severe uncontrolled pain     Call MD for:  difficulty breathing, headache or visual disturbances     Call MD for:  redness, tenderness, or signs of infection (pain, swelling, redness, odor or green/yellow discharge around incision site)     Wound care routine (specify)   Order Comments: Wound care routine: Antibiotic ointment and a dressing are large Band-Aid daily.  Okay to shower and get the area wet starting on Friday          Discharge Date: No discharge date for patient encounter.

## 2018-03-15 RX ORDER — LEVOTHYROXINE SODIUM 137 UG/1
137 TABLET ORAL
Qty: 90 TABLET | Refills: 3 | Status: SHIPPED | OUTPATIENT
Start: 2018-03-15 | End: 2019-04-02 | Stop reason: SDUPTHER

## 2018-03-15 NOTE — TELEPHONE ENCOUNTER
----- Message from Yane Westbrook sent at 3/15/2018  1:39 PM CDT -----  Contact: [phone]  Pt would like refills called in on Rx medication for Thyroid.  Please call back at [phone].  Thx_Ah       Pt Uses:  .  [facility] [address] [place] [id number]  Phone: [phone] Fax: [phone]

## 2018-03-19 DIAGNOSIS — Z17.0 MALIGNANT NEOPLASM OF OVERLAPPING SITES OF RIGHT BREAST IN FEMALE, ESTROGEN RECEPTOR POSITIVE: Primary | ICD-10-CM

## 2018-03-19 DIAGNOSIS — G89.3 NEOPLASM RELATED PAIN: ICD-10-CM

## 2018-03-19 DIAGNOSIS — C77.3 MALIGNANT NEOPLASM METASTATIC TO LYMPH NODE OF AXILLA: ICD-10-CM

## 2018-03-19 DIAGNOSIS — C50.811 MALIGNANT NEOPLASM OF OVERLAPPING SITES OF RIGHT BREAST IN FEMALE, ESTROGEN RECEPTOR POSITIVE: Primary | ICD-10-CM

## 2018-03-19 DIAGNOSIS — C79.51 SECONDARY CANCER OF BONE: ICD-10-CM

## 2018-03-26 ENCOUNTER — OFFICE VISIT (OUTPATIENT)
Dept: SURGERY | Facility: CLINIC | Age: 48
End: 2018-03-26
Payer: MEDICAID

## 2018-03-26 VITALS
SYSTOLIC BLOOD PRESSURE: 116 MMHG | DIASTOLIC BLOOD PRESSURE: 66 MMHG | WEIGHT: 216 LBS | HEART RATE: 72 BPM | BODY MASS INDEX: 33.83 KG/M2 | TEMPERATURE: 98 F

## 2018-03-26 DIAGNOSIS — C77.3 MALIGNANT NEOPLASM METASTATIC TO LYMPH NODE OF AXILLA: ICD-10-CM

## 2018-03-26 DIAGNOSIS — C79.51 SECONDARY CANCER OF BONE: ICD-10-CM

## 2018-03-26 DIAGNOSIS — C50.811 MALIGNANT NEOPLASM OF OVERLAPPING SITES OF RIGHT BREAST IN FEMALE, ESTROGEN RECEPTOR POSITIVE: ICD-10-CM

## 2018-03-26 DIAGNOSIS — Z48.02 ENCOUNTER FOR REMOVAL OF SUTURES: Primary | ICD-10-CM

## 2018-03-26 DIAGNOSIS — Z17.0 MALIGNANT NEOPLASM OF OVERLAPPING SITES OF RIGHT BREAST IN FEMALE, ESTROGEN RECEPTOR POSITIVE: ICD-10-CM

## 2018-03-26 DIAGNOSIS — G89.3 NEOPLASM RELATED PAIN: ICD-10-CM

## 2018-03-26 PROBLEM — R22.9 SUBCUTANEOUS MASS: Status: RESOLVED | Noted: 2018-03-14 | Resolved: 2018-03-26

## 2018-03-26 PROBLEM — L73.2 HIDRADENITIS: Status: RESOLVED | Noted: 2017-05-19 | Resolved: 2018-03-26

## 2018-03-26 PROBLEM — T14.8XXA OPEN WOUND: Status: RESOLVED | Noted: 2017-06-16 | Resolved: 2018-03-26

## 2018-03-26 PROCEDURE — 99024 POSTOP FOLLOW-UP VISIT: CPT | Mod: ,,, | Performed by: SURGERY

## 2018-03-26 PROCEDURE — 99213 OFFICE O/P EST LOW 20 MIN: CPT | Mod: PBBFAC | Performed by: SURGERY

## 2018-03-26 PROCEDURE — 99999 PR PBB SHADOW E&M-EST. PATIENT-LVL III: CPT | Mod: PBBFAC,,, | Performed by: SURGERY

## 2018-03-26 RX ORDER — HYDROCODONE BITARTRATE AND ACETAMINOPHEN 7.5; 325 MG/1; MG/1
1 TABLET ORAL EVERY 6 HOURS PRN
Qty: 60 TABLET | Refills: 0 | Status: SHIPPED | OUTPATIENT
Start: 2018-03-26 | End: 2018-04-09 | Stop reason: SDUPTHER

## 2018-03-26 NOTE — PROGRESS NOTES
Subjective:       Patient ID: Josey Flores is a 47 y.o. female.    Post excision of 2 right arm states since  Chief Complaint: Post-op Evaluation    Doing well.  Pain is resolving.  Incisions are healing however there is a little bit of oozing from the upper excision site      Review of Systems   Skin:        Slight drainage from the upper incision       Objective:      Physical Exam   Constitutional: No distress.   Overweight   Skin:   The right elbow incision is healing well.  Sutures removed Steri-Strips were placed.    The upper arm incision has some skin separation a slight a bit serous drainage.  The sutures removed.  Antibiotic ointment and a Band-Aid were placed   Vitals reviewed.      Pathology of the larger lesion was consistent with epidermal inclusion cyst  Assessment:     status post excision of sebaceous cyst by 2.  Slight opening of the superior incision    Plan:       remove the Steri-Strips when they begin to fall off.    Antibiotic ointment and a Band-Aid to the upper incision.  Follow-up as needed

## 2018-03-26 NOTE — PATIENT INSTRUCTIONS
Remove the paper strips when they begin to fall off.    In the area of the skin breakdown on the upper arm please use a little bit of antibiotic ointment and a Band-Aid.    We will see you back as needed

## 2018-04-05 DIAGNOSIS — F41.9 ANXIETY: ICD-10-CM

## 2018-04-05 RX ORDER — ALPRAZOLAM 0.5 MG/1
0.5 TABLET ORAL 2 TIMES DAILY PRN
Qty: 60 TABLET | Refills: 0 | Status: SHIPPED | OUTPATIENT
Start: 2018-04-05 | End: 2018-05-04 | Stop reason: SDUPTHER

## 2018-04-06 ENCOUNTER — TELEPHONE (OUTPATIENT)
Dept: HEMATOLOGY/ONCOLOGY | Facility: CLINIC | Age: 48
End: 2018-04-06

## 2018-04-06 NOTE — TELEPHONE ENCOUNTER
----- Message from Elena Street sent at 4/6/2018 10:08 AM CDT -----  Contact: pt  The pt request a call to reschedule today's appt, can be reached at [phone]///[phone]

## 2018-04-09 DIAGNOSIS — C50.811 MALIGNANT NEOPLASM OF OVERLAPPING SITES OF RIGHT BREAST IN FEMALE, ESTROGEN RECEPTOR POSITIVE: ICD-10-CM

## 2018-04-09 DIAGNOSIS — C77.3 MALIGNANT NEOPLASM METASTATIC TO LYMPH NODE OF AXILLA: ICD-10-CM

## 2018-04-09 DIAGNOSIS — C79.51 SECONDARY CANCER OF BONE: ICD-10-CM

## 2018-04-09 DIAGNOSIS — G89.3 NEOPLASM RELATED PAIN: ICD-10-CM

## 2018-04-09 DIAGNOSIS — Z17.0 MALIGNANT NEOPLASM OF OVERLAPPING SITES OF RIGHT BREAST IN FEMALE, ESTROGEN RECEPTOR POSITIVE: ICD-10-CM

## 2018-04-09 RX ORDER — HYDROCODONE BITARTRATE AND ACETAMINOPHEN 7.5; 325 MG/1; MG/1
1 TABLET ORAL EVERY 6 HOURS PRN
Qty: 60 TABLET | Refills: 0 | Status: SHIPPED | OUTPATIENT
Start: 2018-04-09 | End: 2018-04-23 | Stop reason: SDUPTHER

## 2018-04-17 ENCOUNTER — OFFICE VISIT (OUTPATIENT)
Dept: HEMATOLOGY/ONCOLOGY | Facility: CLINIC | Age: 48
End: 2018-04-17
Payer: MEDICAID

## 2018-04-17 ENCOUNTER — INFUSION (OUTPATIENT)
Dept: INFUSION THERAPY | Facility: HOSPITAL | Age: 48
End: 2018-04-17
Attending: INTERNAL MEDICINE
Payer: MEDICAID

## 2018-04-17 VITALS
HEIGHT: 67 IN | OXYGEN SATURATION: 97 % | HEART RATE: 94 BPM | WEIGHT: 216.25 LBS | BODY MASS INDEX: 33.94 KG/M2 | DIASTOLIC BLOOD PRESSURE: 82 MMHG | TEMPERATURE: 97 F | SYSTOLIC BLOOD PRESSURE: 98 MMHG

## 2018-04-17 DIAGNOSIS — C79.51 SECONDARY CANCER OF BONE: ICD-10-CM

## 2018-04-17 DIAGNOSIS — C50.811 MALIGNANT NEOPLASM OF OVERLAPPING SITES OF RIGHT BREAST IN FEMALE, ESTROGEN RECEPTOR POSITIVE: ICD-10-CM

## 2018-04-17 DIAGNOSIS — Z17.0 MALIGNANT NEOPLASM OF OVERLAPPING SITES OF RIGHT BREAST IN FEMALE, ESTROGEN RECEPTOR POSITIVE: ICD-10-CM

## 2018-04-17 DIAGNOSIS — C77.3 MALIGNANT NEOPLASM METASTATIC TO LYMPH NODE OF AXILLA: ICD-10-CM

## 2018-04-17 DIAGNOSIS — F41.9 ANXIETY: ICD-10-CM

## 2018-04-17 DIAGNOSIS — Z17.0 MALIGNANT NEOPLASM OF OVERLAPPING SITES OF RIGHT BREAST IN FEMALE, ESTROGEN RECEPTOR POSITIVE: Primary | ICD-10-CM

## 2018-04-17 DIAGNOSIS — F17.200 SMOKING: ICD-10-CM

## 2018-04-17 DIAGNOSIS — G89.3 NEOPLASM RELATED PAIN: ICD-10-CM

## 2018-04-17 DIAGNOSIS — C79.51 SECONDARY CANCER OF BONE: Primary | ICD-10-CM

## 2018-04-17 DIAGNOSIS — C50.811 MALIGNANT NEOPLASM OF OVERLAPPING SITES OF RIGHT BREAST IN FEMALE, ESTROGEN RECEPTOR POSITIVE: Primary | ICD-10-CM

## 2018-04-17 PROCEDURE — 99214 OFFICE O/P EST MOD 30 MIN: CPT | Mod: PBBFAC,25 | Performed by: INTERNAL MEDICINE

## 2018-04-17 PROCEDURE — 99999 PR PBB SHADOW E&M-EST. PATIENT-LVL IV: CPT | Mod: PBBFAC,,, | Performed by: INTERNAL MEDICINE

## 2018-04-17 PROCEDURE — 99215 OFFICE O/P EST HI 40 MIN: CPT | Mod: 25,S$PBB,, | Performed by: INTERNAL MEDICINE

## 2018-04-17 PROCEDURE — 63600175 PHARM REV CODE 636 W HCPCS: Mod: JG | Performed by: INTERNAL MEDICINE

## 2018-04-17 PROCEDURE — 96372 THER/PROPH/DIAG INJ SC/IM: CPT

## 2018-04-17 RX ADMIN — DENOSUMAB 120 MG: 120 INJECTION SUBCUTANEOUS at 04:04

## 2018-04-17 NOTE — PROGRESS NOTES
Reason for visit: Right breast carcinoma  Date of Diagnosis: January 12, 2017    HPI:   The patient is a 47-year-old  female who presents to the hematology oncology clinic today for follow up for metastatic right breast carcinoma.    I have reviewed all of the patient's relevant clinical history available in the medical record and have utilized this in my evaluation and management recommendations today.  The patient had 2 separate areas in the right breast that were biopsied area the first was a right breast mass at 11:00 which was 3 cm from the nipple which showed invasive mammary carcinoma.  The invasive carcinoma measured at least 1.2 cm in greatest dimension and appeared high-grade.  This tumor was ER positive/[place] positive/HER-2 negative.  The second breast mass was biopsied from the retroperitoneal area lower area and was also positive for invasive mammary carcinoma.  This measured at least 0.4 cm and also appeared high-grade.  The tumor is morphologically very similar to the tumor in the prior specimen.  Receptor studies were not done on this specimen.  The patient also had a palpable right axillary lymph node which was biopsied and showed metastatic mammary carcinoma which measured at least 1.3 cm in greatest dimension.  The patient had self palpated this breast mass.  In addition the patient has a palpable mass in the region of her right elbow which she reports has been present for 9 years but had been slowly growing especially over the past year or 2.  She denies any pain associated with this.    Most recently she is status post total thyroidectomy for papillary thyroid carcinoma.  Today the patient reports that she continues to have lower back pain.  She has been having chronic problems with anxiety and depression.  However she denies any suicidal or homicidal ideation.  She reports chronic fatigue. She is taking norco PRN neoplasm related pain.  She denies any fevers or chills. Denies night  sweats.  She denies any melena, hematochezia, hematemesis, hemoptysis or hematuria. She denies nausea. Denies vomiting or abdominal pain.  She denies cough.  She continues on ibrance and letrozole. Ibrance was started on 2/21/17.    PAST MEDICAL HISTORY:   1. HSIL on pap smear s/p LEEP    SURGICAL HISTORY:   1.  Tonsillectomy and adenoidectomy  2.  Appendectomy  3.  Cholecystectomy  4.  D&C  5.  Thyroidectomy on August 31, 2017  6.  Right axillary hydradenitis/cellulitis and left inguinal hydradenitis/cellulitis s/p debridement and skin grafting    FAMILY HISTORY: She reports that her maternal aunt had lung cancer in her 70s.  She used to smoke cigarettes.  Her maternal cousin had lung cancer in her 60s.  She used to smoke cigarettes.  She denies any other immediate family members with cancer or bleeding/clotting disorders.    SOCIAL HISTORY: She reports a 46+ pack year smoking history. She drinks wine occasionally.  She denies any history of recreational drug use.  She is engaged and has 2 sons.  She used to work for the [employer].  She lives in [place], [place].     ALLERGIES: [NKDA]    MEDICATIONS: [Medcard has been reviewed and/or reconciled.]    REVIEW OF SYSTEMS:   GENERAL: [No fevers, chills or sweats. Reports fatigue. Reports weight gain. Denies loss of appetite.]  HEENT: [No blurred vision, tinnitus, nasal discharge, sorethroat or dysphagia.]  HEART: [No chest pain, palpitations or shortness of breath.]    LUNGS: [Reports chronic cough. Denies hemoptysis or breathing problems.]  ABDOMEN: [No abdominal pain, nausea, vomiting, diarrhea, constipation or melena.]  GENITOURINARY: [No dysuria, bleeding or malodorous discharge.]  NEURO: [No headache, dizziness or vertigo.]  HEMATOLOGY: [No easy bruising, spontaneous bleeding or blood clots in the past].  MUSCULOSKELETAL: [Reports arthralgias, myalgias and bone pains.]  SKIN: [No rashes or skin lesions.]  PSYCHIATRY: [Reports depression. Reports anxiety.]   Denies suicidal/homicidal ideation.    PHYSICAL EXAMINATION:   VS: Reviewed on nurse's notes.  APPEARANCE: The patient is a well-developed, well-nourished and well-groomed  female who appears in no acute distress.   HEENT: No scleral icterus. Both external auditory canals clear. No oral ulcers, lesions. Throat clear  HEAD: No sinus tenderness.  NECK: Supple. No palpable lymphadenopathy. Thyroid non-tender, no palpable masses  CHEST: Breath sounds clear bilaterally. No rales. No rhonchi. Unlabored respirations.  BREAST: Deferred today.  CARDIOVASCULAR: Normal S1, S2. Normal rate. Regular rhythm.  ABDOMEN: Bowel sounds normal. No tenderness. No abdominal distention. No hepatomegaly. No splenomegaly.  BACK: Palpable tenderness in the lower back.  LYMPHATIC: No palpable supraclavicular lymphadenopathy. Bilateral axillary healed wounds noted.  EXTREMITIES: No clubbing, cyanosis. Palpable firm 3 cm mass in the right elbow is noted. Trace edema in bilateral lower extremities.      SKIN: No lesions. No petechiae. No ecchymoses.   NEUROLOGIC: No focal findings. Alert & Oriented x 3. Mood appropriate to affect    LABS:   Reviewed    IMAGING:  Reviewed    IMPRESSION:  1.  Metastatic multifocal infiltrating ductal carcinoma of the right breast with right axillary and bone involvement [ER positive/[place] positive/HER-2 negative]  2.  Papillary thyroid carcinoma  3.  Tobacco abuse  4.  Right elbow mass  5.  Anxiety and depression    PLAN:  1.  I had a detailed discussion with the patient previously with regard to the diagnosis, prognosis and treatment options for metastatic multifocal invasive ductal carcinoma of the right breast with right axillary and bone involvement.  2.  I have discussed that at this time her metastatic malignancy is potentially treatable but not curable.  3.  The patient was again strongly encouraged to quit smoking.  She expressed understanding.  Prescription for Xanax when necessary anxiety was  previously given to the patient after full discussion of sedation precautions.    4. FSH and estradiol levels confirm menopausal status. TSH lab was normal.   5.  MRI of the brain on 1/31/17 to evaluate for any evidence of metastatic disease to complete staging workup was negative for metastatic malignancy to the brain. CT head done in March 2017 in ED was also ok.  6. Continue to proceed with ibrance/letrozole.  She appears to be tolerating this well so far with no significant side effects.   7. She has been advised to take calcium and vit d supplementation everyday. She is on xgeva injections for treatment of bone involvement by malignancy. Risks/benefits and common side effects discussed and she expressed understanding and agreement. She has dentures.  She will proceed with her next scheduled dose of Xgeva on 4/17/18.  8. Continue Lasix PRN for treatment of bilateral lower extremity edema.  Supplemental potassium was given as well.  Advised to use compression stockings and elevate legs.  9.  She will continue follow up with with Dr. Jason with endocrinology for follow up of papillary thyroid carcinoma.    10.  Results of MRI of the thoracic and lumbar spine from 9/21/17 for evaluation of current acute symptoms related to back pain reviewed in detail with pateint. No acute process involving the spinal cord is noted. She does have bone involvement by malignancy in multiple areas. However the PET/CT done on 9/27/17 shows no evidence of new areas of bony or visceral involvement.  Hence there appears to be no evidence of metastatic disease progression.  However since the patient continues to have low back pain and MRI of lumbar spine does show enhancing marrow replacing metastatic lesions are seen in the bilateral iliac bones posteriorly. She did see radiation oncology and completed palliative XRT to this location which helped significantly with her symptoms.    11.  The patient will continue follow up with   Apoorva with clinical psychology for her chronic anxiety and depression.  The patient has been advised to seek immediate medical attention if any suicidal/homicidal ideation.  She expressed understanding.  12. CT scans done in Jan 2018 shows that her metastatic breast cancer continues to be stable/well controlled. I will plan to recheck in May 2018.     Return to clinic in 1 month with labs, CT scans to discuss further management and for her next dose of Xgeva.  She knows to call sooner for any new problems or questions.    Richard Delong MD

## 2018-04-17 NOTE — PATIENT INSTRUCTIONS
Remember to take your calcium with vitamin D supplement as directed.   Do not have any dental work for 3 months following your injection today.  [address]  [phone] phone     [phone] fax  Hours of Operation: Monday- Friday 8:00am- 5:00pm  After hours phone  [phone]  [facility]ology / On[facility] Physicians on call      Dr. Jake Keith                        Please call with any concerns regarding your appointment today.

## 2018-04-23 DIAGNOSIS — G89.3 NEOPLASM RELATED PAIN: ICD-10-CM

## 2018-04-23 DIAGNOSIS — Z17.0 MALIGNANT NEOPLASM OF OVERLAPPING SITES OF RIGHT BREAST IN FEMALE, ESTROGEN RECEPTOR POSITIVE: ICD-10-CM

## 2018-04-23 DIAGNOSIS — C79.51 SECONDARY CANCER OF BONE: ICD-10-CM

## 2018-04-23 DIAGNOSIS — C77.3 MALIGNANT NEOPLASM METASTATIC TO LYMPH NODE OF AXILLA: ICD-10-CM

## 2018-04-23 DIAGNOSIS — C50.811 MALIGNANT NEOPLASM OF OVERLAPPING SITES OF RIGHT BREAST IN FEMALE, ESTROGEN RECEPTOR POSITIVE: ICD-10-CM

## 2018-04-23 RX ORDER — HYDROCODONE BITARTRATE AND ACETAMINOPHEN 7.5; 325 MG/1; MG/1
1 TABLET ORAL EVERY 6 HOURS PRN
Qty: 120 TABLET | Refills: 0 | Status: SHIPPED | OUTPATIENT
Start: 2018-04-23 | End: 2018-05-23 | Stop reason: SDUPTHER

## 2018-05-01 DIAGNOSIS — Z17.0 MALIGNANT NEOPLASM OF OVERLAPPING SITES OF RIGHT BREAST IN FEMALE, ESTROGEN RECEPTOR POSITIVE: ICD-10-CM

## 2018-05-01 DIAGNOSIS — C77.3 MALIGNANT NEOPLASM METASTATIC TO LYMPH NODE OF AXILLA: ICD-10-CM

## 2018-05-01 DIAGNOSIS — C50.811 MALIGNANT NEOPLASM OF OVERLAPPING SITES OF RIGHT BREAST IN FEMALE, ESTROGEN RECEPTOR POSITIVE: ICD-10-CM

## 2018-05-01 DIAGNOSIS — C79.51 SECONDARY CANCER OF BONE: ICD-10-CM

## 2018-05-01 DIAGNOSIS — G89.3 NEOPLASM RELATED PAIN: ICD-10-CM

## 2018-05-01 RX ORDER — PALBOCICLIB 125 MG/1
CAPSULE ORAL
Qty: 21 CAPSULE | Refills: 2 | Status: SHIPPED | OUTPATIENT
Start: 2018-05-01 | End: 2018-08-09 | Stop reason: SDUPTHER

## 2018-05-04 DIAGNOSIS — F41.9 ANXIETY: ICD-10-CM

## 2018-05-04 RX ORDER — ALPRAZOLAM 0.5 MG/1
0.5 TABLET ORAL 2 TIMES DAILY PRN
Qty: 60 TABLET | Refills: 0 | Status: SHIPPED | OUTPATIENT
Start: 2018-05-04 | End: 2018-06-05 | Stop reason: SDUPTHER

## 2018-05-15 ENCOUNTER — LAB VISIT (OUTPATIENT)
Dept: LAB | Facility: HOSPITAL | Age: 48
End: 2018-05-15
Attending: INTERNAL MEDICINE
Payer: MEDICAID

## 2018-05-15 DIAGNOSIS — C79.51 SECONDARY CANCER OF BONE: ICD-10-CM

## 2018-05-15 DIAGNOSIS — G89.3 NEOPLASM RELATED PAIN: ICD-10-CM

## 2018-05-15 DIAGNOSIS — C50.811 MALIGNANT NEOPLASM OF OVERLAPPING SITES OF RIGHT BREAST IN FEMALE, ESTROGEN RECEPTOR POSITIVE: ICD-10-CM

## 2018-05-15 DIAGNOSIS — F17.200 SMOKING: ICD-10-CM

## 2018-05-15 DIAGNOSIS — C77.3 MALIGNANT NEOPLASM METASTATIC TO LYMPH NODE OF AXILLA: ICD-10-CM

## 2018-05-15 DIAGNOSIS — Z17.0 MALIGNANT NEOPLASM OF OVERLAPPING SITES OF RIGHT BREAST IN FEMALE, ESTROGEN RECEPTOR POSITIVE: ICD-10-CM

## 2018-05-15 DIAGNOSIS — F41.9 ANXIETY: ICD-10-CM

## 2018-05-15 LAB
ALBUMIN SERPL BCP-MCNC: 4.1 G/DL
ALP SERPL-CCNC: 64 U/L
ALT SERPL W/O P-5'-P-CCNC: 20 U/L
ANION GAP SERPL CALC-SCNC: 11 MMOL/L
AST SERPL-CCNC: 16 U/L
BASOPHILS # BLD AUTO: 0.04 K/UL
BASOPHILS NFR BLD: 1.1 %
BILIRUB SERPL-MCNC: 0.4 MG/DL
BUN SERPL-MCNC: 7 MG/DL
CALCIUM SERPL-MCNC: 9.5 MG/DL
CHLORIDE SERPL-SCNC: 104 MMOL/L
CO2 SERPL-SCNC: 25 MMOL/L
CREAT SERPL-MCNC: 1.2 MG/DL
DIFFERENTIAL METHOD: ABNORMAL
EOSINOPHIL # BLD AUTO: 0 K/UL
EOSINOPHIL NFR BLD: 0.9 %
ERYTHROCYTE [DISTWIDTH] IN BLOOD BY AUTOMATED COUNT: 13.6 %
EST. GFR  (AFRICAN AMERICAN): >60 ML/MIN/1.73 M^2
EST. GFR  (NON AFRICAN AMERICAN): 53 ML/MIN/1.73 M^2
GLUCOSE SERPL-MCNC: 114 MG/DL
HCT VFR BLD AUTO: 38.4 %
HGB BLD-MCNC: 13.4 G/DL
LYMPHOCYTES # BLD AUTO: 1.4 K/UL
LYMPHOCYTES NFR BLD: 39.9 %
MCH RBC QN AUTO: 37.9 PG
MCHC RBC AUTO-ENTMCNC: 34.9 G/DL
MCV RBC AUTO: 109 FL
MONOCYTES # BLD AUTO: 0.4 K/UL
MONOCYTES NFR BLD: 12 %
NEUTROPHILS # BLD AUTO: 1.6 K/UL
NEUTROPHILS NFR BLD: 46.1 %
PLATELET # BLD AUTO: 221 K/UL
PMV BLD AUTO: 10.2 FL
POTASSIUM SERPL-SCNC: 4.3 MMOL/L
PROT SERPL-MCNC: 7.7 G/DL
RBC # BLD AUTO: 3.54 M/UL
SODIUM SERPL-SCNC: 140 MMOL/L
WBC # BLD AUTO: 3.51 K/UL

## 2018-05-15 PROCEDURE — 36415 COLL VENOUS BLD VENIPUNCTURE: CPT

## 2018-05-15 PROCEDURE — 80053 COMPREHEN METABOLIC PANEL: CPT

## 2018-05-15 PROCEDURE — 85025 COMPLETE CBC W/AUTO DIFF WBC: CPT

## 2018-05-23 ENCOUNTER — OFFICE VISIT (OUTPATIENT)
Dept: HEMATOLOGY/ONCOLOGY | Facility: CLINIC | Age: 48
End: 2018-05-23
Payer: MEDICAID

## 2018-05-23 ENCOUNTER — INFUSION (OUTPATIENT)
Dept: INFUSION THERAPY | Facility: HOSPITAL | Age: 48
End: 2018-05-23
Attending: INTERNAL MEDICINE
Payer: MEDICAID

## 2018-05-23 VITALS
WEIGHT: 213.5 LBS | SYSTOLIC BLOOD PRESSURE: 98 MMHG | OXYGEN SATURATION: 98 % | RESPIRATION RATE: 20 BRPM | BODY MASS INDEX: 33.51 KG/M2 | DIASTOLIC BLOOD PRESSURE: 65 MMHG | HEART RATE: 74 BPM | TEMPERATURE: 98 F | HEIGHT: 67 IN

## 2018-05-23 DIAGNOSIS — C77.3 MALIGNANT NEOPLASM METASTATIC TO LYMPH NODE OF AXILLA: ICD-10-CM

## 2018-05-23 DIAGNOSIS — C50.811 MALIGNANT NEOPLASM OF OVERLAPPING SITES OF RIGHT BREAST IN FEMALE, ESTROGEN RECEPTOR POSITIVE: Primary | ICD-10-CM

## 2018-05-23 DIAGNOSIS — C79.51 SECONDARY CANCER OF BONE: ICD-10-CM

## 2018-05-23 DIAGNOSIS — G89.3 NEOPLASM RELATED PAIN: ICD-10-CM

## 2018-05-23 DIAGNOSIS — C79.51 SECONDARY CANCER OF BONE: Primary | ICD-10-CM

## 2018-05-23 DIAGNOSIS — Z17.0 MALIGNANT NEOPLASM OF OVERLAPPING SITES OF RIGHT BREAST IN FEMALE, ESTROGEN RECEPTOR POSITIVE: ICD-10-CM

## 2018-05-23 DIAGNOSIS — C50.811 MALIGNANT NEOPLASM OF OVERLAPPING SITES OF RIGHT BREAST IN FEMALE, ESTROGEN RECEPTOR POSITIVE: ICD-10-CM

## 2018-05-23 DIAGNOSIS — Z17.0 MALIGNANT NEOPLASM OF OVERLAPPING SITES OF RIGHT BREAST IN FEMALE, ESTROGEN RECEPTOR POSITIVE: Primary | ICD-10-CM

## 2018-05-23 DIAGNOSIS — R51.9 ACUTE NONINTRACTABLE HEADACHE, UNSPECIFIED HEADACHE TYPE: ICD-10-CM

## 2018-05-23 PROCEDURE — 99214 OFFICE O/P EST MOD 30 MIN: CPT | Mod: PBBFAC,PO,25 | Performed by: INTERNAL MEDICINE

## 2018-05-23 PROCEDURE — 63600175 PHARM REV CODE 636 W HCPCS: Mod: JG,PO | Performed by: INTERNAL MEDICINE

## 2018-05-23 PROCEDURE — 99215 OFFICE O/P EST HI 40 MIN: CPT | Mod: 25,S$PBB,, | Performed by: INTERNAL MEDICINE

## 2018-05-23 PROCEDURE — 96372 THER/PROPH/DIAG INJ SC/IM: CPT | Mod: PO

## 2018-05-23 PROCEDURE — 99999 PR PBB SHADOW E&M-EST. PATIENT-LVL IV: CPT | Mod: PBBFAC,,, | Performed by: INTERNAL MEDICINE

## 2018-05-23 RX ORDER — HYDROCODONE BITARTRATE AND ACETAMINOPHEN 7.5; 325 MG/1; MG/1
1 TABLET ORAL EVERY 6 HOURS PRN
Qty: 120 TABLET | Refills: 0 | Status: SHIPPED | OUTPATIENT
Start: 2018-05-23 | End: 2018-06-22 | Stop reason: SDUPTHER

## 2018-05-23 RX ADMIN — DENOSUMAB 120 MG: 120 INJECTION SUBCUTANEOUS at 11:05

## 2018-05-23 NOTE — PATIENT INSTRUCTIONS
[facility]  [address]  [address] [address]  [phone] phone     [phone] fax  Hours of Operation: Monday- Friday 8:00am- 5:00pm  After hours phone  [phone]  Hematology / Oncology Physicians on call      Dr. Jake Keith                        Please call with any concerns regarding your appointment today.  Remember to take your calcium with vitamin D supplement as directed.   Do not have any dental work for 3 months following your injection today.

## 2018-05-23 NOTE — PROGRESS NOTES
Reason for visit: Right breast carcinoma  Date of Diagnosis: January 12, 2017    HPI:   The patient is a 47-year-old  female who presents to the hematology oncology clinic today for follow up for metastatic right breast carcinoma.    I have reviewed all of the patient's relevant clinical history available in the medical record and have utilized this in my evaluation and management recommendations today.  The patient had 2 separate areas in the right breast that were biopsied area the first was a right breast mass at 11:00 which was 3 cm from the nipple which showed invasive mammary carcinoma.  The invasive carcinoma measured at least 1.2 cm in greatest dimension and appeared high-grade.  This tumor was ER positive/[place] positive/HER-2 negative.  The second breast mass was biopsied from the retroperitoneal area lower area and was also positive for invasive mammary carcinoma.  This measured at least 0.4 cm and also appeared high-grade.  The tumor is morphologically very similar to the tumor in the prior specimen.  Receptor studies were not done on this specimen.  The patient also had a palpable right axillary lymph node which was biopsied and showed metastatic mammary carcinoma which measured at least 1.3 cm in greatest dimension.  The patient had self palpated this breast mass.  In addition the patient has a palpable mass in the region of her right elbow which she reports has been present for 9 years but had been slowly growing especially over the past year or 2.  She denies any pain associated with this.    Most recently she is status post total thyroidectomy for papillary thyroid carcinoma.  Today the patient reports that she continues to have lower back pain.  She has been having chronic problems with anxiety and depression.  However she denies any suicidal or homicidal ideation.  She reports chronic fatigue. She is taking norco PRN neoplasm related pain.  She denies any fevers or chills. Denies night  sweats.  She denies any melena, hematochezia, hematemesis, hemoptysis or hematuria. She denies nausea. Denies vomiting or abdominal pain.  She denies cough.  She continues on ibrance and letrozole. Ibrance was started on 2/21/17.  She reports an acute non-intractable headache for the past 3 days.    PAST MEDICAL HISTORY:   1. HSIL on pap smear s/p LEEP    SURGICAL HISTORY:   1.  Tonsillectomy and adenoidectomy  2.  Appendectomy  3.  Cholecystectomy  4.  D&C  5.  Thyroidectomy on August 31, 2017  6.  Right axillary hydradenitis/cellulitis and left inguinal hydradenitis/cellulitis s/p debridement and skin grafting    FAMILY HISTORY: She reports that her maternal aunt had lung cancer in her 70s.  She used to smoke cigarettes.  Her maternal cousin had lung cancer in her 60s.  She used to smoke cigarettes.  She denies any other immediate family members with cancer or bleeding/clotting disorders.    SOCIAL HISTORY: She reports a 46+ pack year smoking history. She drinks wine occasionally.  She denies any history of recreational drug use.  She is engaged and has 2 sons.  She used to work for the [employer].  She lives in [place], [place].     ALLERGIES: [NKDA]    MEDICATIONS: [Medcard has been reviewed and/or reconciled.]    REVIEW OF SYSTEMS:   GENERAL: [No fevers, chills or sweats. Reports fatigue. Reports weight gain. Denies loss of appetite.]  HEENT: [No blurred vision, tinnitus, nasal discharge, sorethroat or dysphagia.]  HEART: [No chest pain, palpitations or shortness of breath.]    LUNGS: [Reports chronic cough. Denies hemoptysis or breathing problems.]  ABDOMEN: [No abdominal pain, nausea, vomiting, diarrhea, constipation or melena.]  GENITOURINARY: [No dysuria, bleeding or malodorous discharge.]  NEURO: [Reports headache. Denies dizziness or vertigo.]  HEMATOLOGY: [No easy bruising, spontaneous bleeding or blood clots in the past].  MUSCULOSKELETAL: [Reports arthralgias, myalgias and bone pains.]  SKIN:  [No rashes or skin lesions.]  PSYCHIATRY: [Reports depression. Reports anxiety.]  Denies suicidal/homicidal ideation.    PHYSICAL EXAMINATION:   VS: Reviewed on nurse's notes.  APPEARANCE: The patient is a well-developed, well-nourished and well-groomed  female who appears in no acute distress.   HEENT: No scleral icterus. Both external auditory canals clear. No oral ulcers, lesions. Throat clear  HEAD: No sinus tenderness.  NECK: Supple. No palpable lymphadenopathy. Thyroid non-tender, no palpable masses  CHEST: Breath sounds clear bilaterally. No rales. No rhonchi. Unlabored respirations.  BREAST: Deferred today.  CARDIOVASCULAR: Normal S1, S2. Normal rate. Regular rhythm.  ABDOMEN: Bowel sounds normal. No tenderness. No abdominal distention. No hepatomegaly. No splenomegaly.  BACK: Palpable tenderness in the lower back.  LYMPHATIC: No palpable supraclavicular lymphadenopathy. Bilateral axillary healed wounds noted.  EXTREMITIES: No clubbing, cyanosis. Palpable firm 3 cm mass in the right elbow is noted. Trace edema in bilateral lower extremities.      SKIN: No lesions. No petechiae. No ecchymoses.   NEUROLOGIC: No focal findings. Alert & Oriented x 3. Mood appropriate to affect    LABS:   Reviewed    IMAGING:  Reviewed    IMPRESSION:  1.  Metastatic multifocal infiltrating ductal carcinoma of the right breast with right axillary and bone involvement [ER positive/[place] positive/HER-2 negative]  2.  Papillary thyroid carcinoma  3.  Tobacco abuse  4.  Right elbow mass  5.  Anxiety and depression  6.  Acute headache    PLAN:  1.  I had a detailed discussion with the patient previously with regard to the diagnosis, prognosis and treatment options for metastatic multifocal invasive ductal carcinoma of the right breast with right axillary and bone involvement.  2.  I have discussed that at this time her metastatic malignancy is potentially treatable but not curable.  3.  The patient was again strongly encouraged  to quit smoking.  She expressed understanding.  Prescription for Xanax when necessary anxiety was previously given to the patient after full discussion of sedation precautions.    4. FSH and estradiol levels confirm menopausal status. TSH lab was normal.   5.  MRI of the brain on 1/31/17 to evaluate for any evidence of metastatic disease to complete staging workup was negative for metastatic malignancy to the brain. CT head done in March 2017 in ED was also ok.  6. Continue to proceed with ibrance/letrozole.  She appears to be tolerating this well so far with no significant side effects.   7. She has been advised to take calcium and vit d supplementation everyday. She is on xgeva injections for treatment of bone involvement by malignancy. Risks/benefits and common side effects discussed and she expressed understanding and agreement. She has dentures.  She will proceed with her next scheduled dose of Xgeva on 5/23/18.  8. Continue Lasix PRN for treatment of bilateral lower extremity edema.  Supplemental potassium was given as well.  Advised to use compression stockings and elevate legs.  9.  She will continue follow up with with Dr. Jason with endocrinology for follow up of papillary thyroid carcinoma.    10.  Results of MRI of the thoracic and lumbar spine from 9/21/17 for evaluation of current acute symptoms related to back pain reviewed in detail with pateint. No acute process involving the spinal cord is noted. She does have bone involvement by malignancy in multiple areas. However the PET/CT done on 9/27/17 shows no evidence of new areas of bony or visceral involvement.  Hence there appears to be no evidence of metastatic disease progression.  However since the patient continues to have low back pain and MRI of lumbar spine does show enhancing marrow replacing metastatic lesions are seen in the bilateral iliac bones posteriorly. She did see radiation oncology and completed palliative XRT to this location which  helped significantly with her symptoms.    11.  The patient will continue follow up with Dr. Guerin with clinical psychology for her chronic anxiety and depression.  The patient has been advised to seek immediate medical attention if any suicidal/homicidal ideation.  She expressed understanding.  12. CT scans done in Jan 2018 shows that her metastatic breast cancer continues to be stable/well controlled. I will plan to recheck in May 2018.  This was not done because the CT machine was not functioning on that day.  This will be rescheduled as soon as possible.  13.  I have recommended that she proceed to the emergency room at [facility] [place] for further evaluation of her acute headache.  The patient will need CT/MRI imaging of the brain to evaluate for any evidence of metastatic disease as the etiology for this.    Return to clinic in 4 weeks with labs to discuss further management and for her next dose of Xgeva.  She knows to call sooner for any new problems or questions.    Richard Delong MD

## 2018-05-24 ENCOUNTER — HOSPITAL ENCOUNTER (OUTPATIENT)
Dept: RADIOLOGY | Facility: HOSPITAL | Age: 48
Discharge: HOME | End: 2018-05-24
Attending: INTERNAL MEDICINE
Payer: MEDICAID

## 2018-05-24 DIAGNOSIS — R51.9 ACUTE NONINTRACTABLE HEADACHE, UNSPECIFIED HEADACHE TYPE: ICD-10-CM

## 2018-05-24 DIAGNOSIS — Z17.0 MALIGNANT NEOPLASM OF OVERLAPPING SITES OF RIGHT BREAST IN FEMALE, ESTROGEN RECEPTOR POSITIVE: ICD-10-CM

## 2018-05-24 DIAGNOSIS — C50.811 MALIGNANT NEOPLASM OF OVERLAPPING SITES OF RIGHT BREAST IN FEMALE, ESTROGEN RECEPTOR POSITIVE: ICD-10-CM

## 2018-05-24 DIAGNOSIS — C77.3 MALIGNANT NEOPLASM METASTATIC TO LYMPH NODE OF AXILLA: ICD-10-CM

## 2018-05-24 DIAGNOSIS — C79.51 SECONDARY CANCER OF BONE: ICD-10-CM

## 2018-05-24 PROCEDURE — 25500020 PHARM REV CODE 255: Performed by: INTERNAL MEDICINE

## 2018-05-24 PROCEDURE — 74177 CT ABD & PELVIS W/CONTRAST: CPT | Mod: TC

## 2018-05-24 RX ADMIN — IOHEXOL 30 ML: 350 INJECTION, SOLUTION INTRAVENOUS at 09:05

## 2018-05-24 RX ADMIN — IOHEXOL 75 ML: 350 INJECTION, SOLUTION INTRAVENOUS at 10:05

## 2018-05-30 NOTE — PROGRESS NOTES
Addendum to Inpatient Note


Addendum Reason:  Additional Documentation


Additional Information


Patient provided two phone numbers to try if blood cultures return positive and 

he requires notification.


The first phone number he provided was his grandmother, Franny Lucas, who can be 

reached at [phone]. 


The other phone number is the patient's cell phone number: [phone].











Landau,Michael A MD R2 May 30, 2018 12:02 [facility] -   Hematology/Oncology  Progress Note    Patient Name: Josey Flores  Admission Date: 5/16/2017  Hospital Length of Stay: 7 days  Code Status: Full Code     Subjective:     HPI:  Josey Flores is a 46 y.o. female with metastatic breast cancer on chemotherapy admitted for management of multiple abscesses related to hidradenitis. She has reportedly had recurrent problems with hidradenitis for several years. She has been managed with outpatient antibiotics in the past. However, she was evaluated on 5/16 in HemOn clinic by Dr. Delong with complaint of worsening pain in her right axilla and left groin. She was referred for admission for surgical I&D of these abscesses. She was started on IV Cefepime and Linezolid. She was evaluated by surgery and underwent excision of skin, subcutaneous tissue and lymph nodes in the R axilla and L groin with wound vac placement today. She states she currently has a great deal of pain in the R axilla, but is doing well otherwise. No fevers.       Interval History:   No events overnight    Oncology Treatment Plan:   [No treatment plan]    Medications:  Continuous Infusions:   lactated ringers 75 mL/hr at 05/21/17 2340     Scheduled Meds:   citalopram  20 mg Oral Daily    enoxparin  40 mg Subcutaneous Q24H    ergocalciferol  50,000 Units Oral Q7 Days    linezolid 600mg/300ml  600 mg Intravenous Q12H    multivitamin  1 tablet Oral Daily    palbociclib  125 mg Oral Daily    pantoprazole  40 mg Oral Daily     PRN Meds:albuterol sulfate, alprazolam, aluminum-magnesium hydroxide-simethicone, dextromethorphan-guaifenesin  mg/5 ml, dextrose 50%, dextrose 50%, diphenhydrAMINE, glucagon (human recombinant), glucose, glucose, HYDROmorphone, metoclopramide HCl, morphine, ondansetron, ondansetron, oxycodone-acetaminophen, oxycodone-acetaminophen, sodium chloride 0.9%, sodium chloride 0.9%     Review of Systems   Constitutional: Negative for appetite  change, chills, fatigue and fever.   HENT: Negative.  Negative for congestion, dental problem, ear pain, facial swelling, mouth sores, nosebleeds, postnasal drip, sneezing and sore throat.    Eyes: Negative for pain, discharge, redness and itching.   Respiratory: Negative for cough, shortness of breath and wheezing.    Cardiovascular: Negative for chest pain and leg swelling.   Gastrointestinal: Negative for abdominal pain, diarrhea, nausea and vomiting.   Endocrine: Negative.    Genitourinary: Negative for dysuria and frequency.   Musculoskeletal: Positive for myalgias.   Skin: Positive for wound.   Allergic/Immunologic: Negative for environmental allergies, food allergies and immunocompromised state.   Neurological: Negative for dizziness, tremors, syncope, weakness, light-headedness and headaches.   Psychiatric/Behavioral: Negative for confusion, dysphoric mood and hallucinations. The patient is not hyperactive.      Objective:     Vital Signs (Most Recent):  Temp: 99.1 °F (37.3 °C) (05/23/17 0454)  Pulse: 73 (05/23/17 0454)  Resp: 16 (05/23/17 0454)  BP: 106/60 (05/23/17 0454)  SpO2: 95 % (05/23/17 0454) Vital Signs (24h Range):  Temp:  [97.7 °F (36.5 °C)-99.1 °F (37.3 °C)] 99.1 °F (37.3 °C)  Pulse:  [72-82] 73  Resp:  [16-20] 16  SpO2:  [95 %-98 %] 95 %  BP: (104-145)/(56-86) 106/60     Weight: 93.9 kg (207 lb)  Body mass index is 32.42 kg/m².  Body surface area is 2.11 meters squared.      Intake/Output Summary (Last 24 hours) at 05/23/17 0704  Last data filed at 05/23/17 0526   Gross per 24 hour   Intake          3058.75 ml   Output                0 ml   Net          3058.75 ml       Physical Exam   Constitutional: No distress.   HENT:   Head: Normocephalic.   Left Ear: External ear normal.   Nose: Nose normal.   Mouth/Throat: Oropharynx is clear and moist and mucous membranes are normal. Mucous membranes are not pale. No oropharyngeal exudate.   Eyes: Pupils are equal, round, and reactive to light. Right eye  exhibits no discharge. Left eye exhibits no discharge.   Neck: Normal range of motion. Neck supple.   Cardiovascular: Normal rate, regular rhythm and normal heart sounds.  Exam reveals no gallop and no friction rub.    No murmur heard.  Pulmonary/Chest: Effort normal. No respiratory distress. She has no wheezes. She has no rales.   Abdominal: Soft. Bowel sounds are normal. She exhibits no distension. There is no tenderness.   Musculoskeletal: She exhibits no edema, tenderness or deformity.   Skin:   There is a wound VAC to the right and left axilla with no leakage  There is a wound VAC to the left groin with no leakage   Psychiatric: She has a normal mood and affect. Thought content normal.   Vitals reviewed.      Significant Labs:   CBC:   Recent Labs  Lab 05/22/17 0453 05/23/17  0511   WBC 6.61 6.79   HGB 10.3* 11.5*   HCT 30.7* 34.3*    347   , CMP:   Recent Labs  Lab 05/22/17  0453      K 4.0      CO2 28      BUN 7   CREATININE 0.9   CALCIUM 8.2*   PROT 6.1   ALBUMIN 2.9*   BILITOT 0.2   ALKPHOS 82   *   *   ANIONGAP 5*   EGFRNONAA >60   , LDH: No results for input(s): LDHCSF, BFSOURCE in the last 48 hours. and LFTs:   Recent Labs  Lab 05/22/17  0453   *   *   ALKPHOS 82   BILITOT 0.2   PROT 6.1   ALBUMIN 2.9*       Diagnostic Results:  I have reviewed all pertinent imaging results/findings within the past 24 hours.    Assessment/Plan:     Malignant neoplasm of overlapping sites of right female breast    Metastatic breast cancer: On Letrozole and Ibrance. She started on Ibrance in 2/2017 and has completed 4 cycles thus far. Restarted cycle 5 of letrozole and Ibrance on Monday 5/22/17.  She has her own supply and will use her own supply of Ibrance. No significant cytopenias with previous cycles. We will continue to monitor counts.       --restarted letrozole 2.5mg daily and Ibrance,125mg Cycle 5 5/22/17  --monitor counts 2-3 times/week        * Hidradenitis     Right axillary and left inguinal abscesses: s/p surgical excision of skin, surrounding subcutaneous tissue - currently with wound vac in place.  --Linezolid  --wound care            Thank you for your consult. I will follow-up with patient. Please contact us if you have any additional questions.     Arya Keith Jr, MD  Hematology/Oncology  [facility]

## 2018-06-05 DIAGNOSIS — F41.9 ANXIETY: ICD-10-CM

## 2018-06-05 RX ORDER — ALPRAZOLAM 0.5 MG/1
0.5 TABLET ORAL 2 TIMES DAILY PRN
Qty: 60 TABLET | Refills: 0 | Status: SHIPPED | OUTPATIENT
Start: 2018-06-05 | End: 2018-07-05 | Stop reason: SDUPTHER

## 2018-06-22 ENCOUNTER — INFUSION (OUTPATIENT)
Dept: INFUSION THERAPY | Facility: HOSPITAL | Age: 48
End: 2018-06-22
Attending: INTERNAL MEDICINE
Payer: MEDICAID

## 2018-06-22 ENCOUNTER — OFFICE VISIT (OUTPATIENT)
Dept: HEMATOLOGY/ONCOLOGY | Facility: CLINIC | Age: 48
End: 2018-06-22
Payer: MEDICAID

## 2018-06-22 ENCOUNTER — LAB VISIT (OUTPATIENT)
Dept: LAB | Facility: HOSPITAL | Age: 48
End: 2018-06-22
Attending: INTERNAL MEDICINE
Payer: MEDICAID

## 2018-06-22 ENCOUNTER — TELEPHONE (OUTPATIENT)
Dept: HEMATOLOGY/ONCOLOGY | Facility: CLINIC | Age: 48
End: 2018-06-22

## 2018-06-22 VITALS
OXYGEN SATURATION: 97 % | TEMPERATURE: 98 F | HEART RATE: 78 BPM | DIASTOLIC BLOOD PRESSURE: 80 MMHG | WEIGHT: 214.31 LBS | HEIGHT: 67 IN | RESPIRATION RATE: 18 BRPM | BODY MASS INDEX: 33.64 KG/M2 | SYSTOLIC BLOOD PRESSURE: 100 MMHG

## 2018-06-22 DIAGNOSIS — C77.3 MALIGNANT NEOPLASM METASTATIC TO LYMPH NODE OF AXILLA: ICD-10-CM

## 2018-06-22 DIAGNOSIS — C50.811 MALIGNANT NEOPLASM OF OVERLAPPING SITES OF RIGHT BREAST IN FEMALE, ESTROGEN RECEPTOR POSITIVE: ICD-10-CM

## 2018-06-22 DIAGNOSIS — G89.3 NEOPLASM RELATED PAIN: ICD-10-CM

## 2018-06-22 DIAGNOSIS — R51.9 ACUTE NONINTRACTABLE HEADACHE, UNSPECIFIED HEADACHE TYPE: ICD-10-CM

## 2018-06-22 DIAGNOSIS — C79.51 SECONDARY CANCER OF BONE: Primary | ICD-10-CM

## 2018-06-22 DIAGNOSIS — C79.51 SECONDARY CANCER OF BONE: ICD-10-CM

## 2018-06-22 DIAGNOSIS — Z17.0 MALIGNANT NEOPLASM OF OVERLAPPING SITES OF RIGHT BREAST IN FEMALE, ESTROGEN RECEPTOR POSITIVE: ICD-10-CM

## 2018-06-22 LAB
ALBUMIN SERPL BCP-MCNC: 4 G/DL
ALP SERPL-CCNC: 66 U/L
ALT SERPL W/O P-5'-P-CCNC: 18 U/L
ANION GAP SERPL CALC-SCNC: 7 MMOL/L
AST SERPL-CCNC: 14 U/L
BASOPHILS # BLD AUTO: 0.12 K/UL
BASOPHILS NFR BLD: 2.5 %
BILIRUB SERPL-MCNC: 0.4 MG/DL
BUN SERPL-MCNC: 10 MG/DL
CALCIUM SERPL-MCNC: 9.8 MG/DL
CHLORIDE SERPL-SCNC: 103 MMOL/L
CO2 SERPL-SCNC: 29 MMOL/L
CREAT SERPL-MCNC: 1.5 MG/DL
DIFFERENTIAL METHOD: ABNORMAL
EOSINOPHIL # BLD AUTO: 0.1 K/UL
EOSINOPHIL NFR BLD: 1.7 %
ERYTHROCYTE [DISTWIDTH] IN BLOOD BY AUTOMATED COUNT: 14.3 %
EST. GFR  (AFRICAN AMERICAN): 47 ML/MIN/1.73 M^2
EST. GFR  (NON AFRICAN AMERICAN): 41 ML/MIN/1.73 M^2
GLUCOSE SERPL-MCNC: 93 MG/DL
HCT VFR BLD AUTO: 38 %
HGB BLD-MCNC: 13.4 G/DL
LYMPHOCYTES # BLD AUTO: 1.5 K/UL
LYMPHOCYTES NFR BLD: 31.2 %
MCH RBC QN AUTO: 39 PG
MCHC RBC AUTO-ENTMCNC: 35.3 G/DL
MCV RBC AUTO: 111 FL
MONOCYTES # BLD AUTO: 0.2 K/UL
MONOCYTES NFR BLD: 4.5 %
NEUTROPHILS # BLD AUTO: 2.9 K/UL
NEUTROPHILS NFR BLD: 60.1 %
PLATELET # BLD AUTO: 233 K/UL
PMV BLD AUTO: 10.1 FL
POTASSIUM SERPL-SCNC: 4.8 MMOL/L
PROT SERPL-MCNC: 7.4 G/DL
RBC # BLD AUTO: 3.44 M/UL
SODIUM SERPL-SCNC: 139 MMOL/L
WBC # BLD AUTO: 4.84 K/UL

## 2018-06-22 PROCEDURE — 63600175 PHARM REV CODE 636 W HCPCS: Mod: JG | Performed by: INTERNAL MEDICINE

## 2018-06-22 PROCEDURE — 85025 COMPLETE CBC W/AUTO DIFF WBC: CPT

## 2018-06-22 PROCEDURE — 99215 OFFICE O/P EST HI 40 MIN: CPT | Mod: 25,S$PBB,, | Performed by: INTERNAL MEDICINE

## 2018-06-22 PROCEDURE — 96372 THER/PROPH/DIAG INJ SC/IM: CPT

## 2018-06-22 PROCEDURE — 80053 COMPREHEN METABOLIC PANEL: CPT

## 2018-06-22 PROCEDURE — 99214 OFFICE O/P EST MOD 30 MIN: CPT | Mod: PBBFAC,25 | Performed by: INTERNAL MEDICINE

## 2018-06-22 PROCEDURE — 99999 PR PBB SHADOW E&M-EST. PATIENT-LVL IV: CPT | Mod: PBBFAC,,, | Performed by: INTERNAL MEDICINE

## 2018-06-22 PROCEDURE — 36415 COLL VENOUS BLD VENIPUNCTURE: CPT

## 2018-06-22 RX ORDER — HYDROCODONE BITARTRATE AND ACETAMINOPHEN 7.5; 325 MG/1; MG/1
1 TABLET ORAL EVERY 6 HOURS PRN
Qty: 120 TABLET | Refills: 0 | Status: SHIPPED | OUTPATIENT
Start: 2018-06-22 | End: 2018-07-20 | Stop reason: SDUPTHER

## 2018-06-22 RX ADMIN — DENOSUMAB 120 MG: 120 INJECTION SUBCUTANEOUS at 03:06

## 2018-06-22 NOTE — TELEPHONE ENCOUNTER
----- Message from Richard Delong MD sent at 6/22/2018  3:31 PM CDT -----  Labs show that she is slightly dehydrated. Please ask her to drink extra fluids for the next few days. thanks

## 2018-06-22 NOTE — PATIENT INSTRUCTIONS
.  [facility]  [address]  [address] [address]  [phone] phone     [phone] fax  Hours of Operation: Monday- Friday 8:00am- 5:00pm  After hours phone  [phone]  Hematology / Oncology Physicians on call      Dr. Jake Keith                        Please call with any concerns regarding your appointment today.      .FALL PREVENTION   Falls often occur due to slipping, tripping or losing your balance. Here are ways to reduce your risk of falling again.   Was there anything that caused your fall that can be fixed, removed or replaced?   Make your home safe by keeping walkways clear of objects you may trip over.   Use non-slip pads under rugs.   Do not walk in poorly lit areas.   Do not stand on chairs or wobbly ladders.   Use caution when reaching overhead or looking upward. This position can cause a loss of balance.   Be sure your shoes fit properly, have non-slip bottoms and are in good condition.   Be cautious when going up and down stairs, curbs, and when walking on uneven sidewalks.   If your balance is poor, consider using a cane or walker.   If your fall was related to alcohol use, stop or limit alcohol intake.   If your fall was related to use of sleeping medicines, talk to your doctor about this. You may need to reduce your dosage at bedtime if you awaken during the night to go to the bathroom.   To reduce the need for nighttime bathroom trips:   Avoid drinking fluids for several hours before going to bed   Empty your bladder before going to bed   Men can keep a urinal at the bedside   © 7522-8714 [employer], [address] [place] [address]. All rights reserved. This information is not intended as a substitute for professional medical care. Always follow your healthcare professional's instructions.

## 2018-06-22 NOTE — PROGRESS NOTES
Reason for visit: Right breast carcinoma  Date of Diagnosis: January 12, 2017    HPI:   The patient is a 47-year-old  female who presents to the hematology oncology clinic today for follow up for metastatic right breast carcinoma.    I have reviewed all of the patient's relevant clinical history available in the medical record and have utilized this in my evaluation and management recommendations today.  The patient had 2 separate areas in the right breast that were biopsied area the first was a right breast mass at 11:00 which was 3 cm from the nipple which showed invasive mammary carcinoma.  The invasive carcinoma measured at least 1.2 cm in greatest dimension and appeared high-grade.  This tumor was ER positive/VA positive/HER-2 negative.  The second breast mass was biopsied from the retroperitoneal area lower area and was also positive for invasive mammary carcinoma.  This measured at least 0.4 cm and also appeared high-grade.  The tumor is morphologically very similar to the tumor in the prior specimen.  Receptor studies were not done on this specimen.  The patient also had a palpable right axillary lymph node which was biopsied and showed metastatic mammary carcinoma which measured at least 1.3 cm in greatest dimension.  The patient had self palpated this breast mass.  In addition the patient has a palpable mass in the region of her right elbow which she reports has been present for 9 years but had been slowly growing especially over the past year or 2.  She denies any pain associated with this.    Most recently she is status post total thyroidectomy for papillary thyroid carcinoma.  Today the patient reports that she continues to have lower back pain.  She has been having chronic problems with anxiety and depression.  However she denies any suicidal or homicidal ideation.  She reports chronic fatigue. She is taking norco PRN neoplasm related pain.  She denies any fevers or chills. Denies night  sweats.  She denies any melena, hematochezia, hematemesis, hemoptysis or hematuria. She denies nausea. Denies vomiting or abdominal pain.  She denies cough.  She continues on ibrance and letrozole. Ibrance was started on 2/21/17.    PAST MEDICAL HISTORY:   1. HSIL on pap smear s/p LEEP    SURGICAL HISTORY:   1.  Tonsillectomy and adenoidectomy  2.  Appendectomy  3.  Cholecystectomy  4.  D&C  5.  Thyroidectomy on August 31, 2017  6.  Right axillary hydradenitis/cellulitis and left inguinal hydradenitis/cellulitis s/p debridement and skin grafting    FAMILY HISTORY: She reports that her maternal aunt had lung cancer in her 70s.  She used to smoke cigarettes.  Her maternal cousin had lung cancer in her 60s.  She used to smoke cigarettes.  She denies any other immediate family members with cancer or bleeding/clotting disorders.    SOCIAL HISTORY: She reports a 46+ pack year smoking history. She drinks wine occasionally.  She denies any history of recreational drug use.  She is engaged and has 2 sons.  She used to work for [employer].  She lives in [place], [place].     ALLERGIES: [NKDA]    MEDICATIONS: [Medcard has been reviewed and/or reconciled.]    REVIEW OF SYSTEMS:   GENERAL: [No fevers, chills or sweats. Reports fatigue. Reports weight gain. Denies loss of appetite.]  HEENT: [No blurred vision, tinnitus, nasal discharge, sorethroat or dysphagia.]  HEART: [No chest pain, palpitations or shortness of breath.]    LUNGS: [Reports chronic cough. Denies hemoptysis or breathing problems.]  ABDOMEN: [No abdominal pain, nausea, vomiting, diarrhea, constipation or melena.]  GENITOURINARY: [No dysuria, bleeding or malodorous discharge.]  NEURO: [Reports headache. Denies dizziness or vertigo.]  HEMATOLOGY: [No easy bruising, spontaneous bleeding or blood clots in the past].  MUSCULOSKELETAL: [Reports arthralgias, myalgias and bone pains.]  SKIN: [No rashes or skin lesions.]  PSYCHIATRY: [Reports depression.  Reports anxiety.]  Denies suicidal/homicidal ideation.    PHYSICAL EXAMINATION:   VS: Reviewed on nurse's notes.  APPEARANCE: The patient is a well-developed, well-nourished and well-groomed  female who appears in no acute distress.   HEENT: No scleral icterus. Both external auditory canals clear. No oral ulcers, lesions. Throat clear  HEAD: No sinus tenderness.  NECK: Supple. No palpable lymphadenopathy. Thyroid non-tender, no palpable masses  CHEST: Breath sounds clear bilaterally. No rales. No rhonchi. Unlabored respirations.  BREAST: Deferred today.  CARDIOVASCULAR: Normal S1, S2. Normal rate. Regular rhythm.  ABDOMEN: Bowel sounds normal. No tenderness. No abdominal distention. No hepatomegaly. No splenomegaly.  BACK: Palpable tenderness in the lower back.  LYMPHATIC: No palpable supraclavicular lymphadenopathy. Bilateral axillary healed wounds noted.  EXTREMITIES: No clubbing, cyanosis. Palpable firm 3 cm mass in the right elbow is noted. Trace edema in bilateral lower extremities.      SKIN: No lesions. No petechiae. No ecchymoses.   NEUROLOGIC: No focal findings. Alert & Oriented x 3. Mood appropriate to affect    LABS:   Reviewed    IMAGING:  Reviewed    IMPRESSION:  1.  Metastatic multifocal infiltrating ductal carcinoma of the right breast with right axillary and bone involvement [ER positive/WA positive/HER-2 negative]  2.  Papillary thyroid carcinoma  3.  Tobacco abuse  4.  Right elbow mass  5.  Anxiety and depression    PLAN:  1.  I had a detailed discussion with the patient previously with regard to the diagnosis, prognosis and treatment options for metastatic multifocal invasive ductal carcinoma of the right breast with right axillary and bone involvement.  2.  I have discussed that at this time her metastatic malignancy is potentially treatable but not curable.  3.  The patient was again strongly encouraged to quit smoking.  She expressed understanding.  Prescription for Xanax when  necessary anxiety was previously given to the patient after full discussion of sedation precautions.    4. FSH and estradiol levels confirm menopausal status. TSH lab was normal.   5.  MRI of the brain on 1/31/17 to evaluate for any evidence of metastatic disease to complete staging workup was negative for metastatic malignancy to the brain. CT head done in March 2017 in ED was also ok.  6. Continue to proceed with ibrance/letrozole.  She appears to be tolerating this well so far with no significant side effects.   7. She has been advised to take calcium and vit d supplementation everyday. She is on xgeva injections for treatment of bone involvement by malignancy. Risks/benefits and common side effects discussed and she expressed understanding and agreement. She has dentures.  She will proceed with her next scheduled dose of Xgeva on 5/23/18.  8. Continue Lasix PRN for treatment of bilateral lower extremity edema.  Supplemental potassium was given as well.  Advised to use compression stockings and elevate legs.  9.  She will continue follow up with with Dr. Jason with endocrinology for follow up of papillary thyroid carcinoma.    10.  Results of MRI of the thoracic and lumbar spine from 9/21/17 for evaluation of current acute symptoms related to back pain reviewed in detail with pateint. No acute process involving the spinal cord is noted. She does have bone involvement by malignancy in multiple areas. However the PET/CT done on 9/27/17 shows no evidence of new areas of bony or visceral involvement.  Hence there appears to be no evidence of metastatic disease progression.  However since the patient continues to have low back pain and MRI of lumbar spine does show enhancing marrow replacing metastatic lesions are seen in the bilateral iliac bones posteriorly. She did see radiation oncology and completed palliative XRT to this location which helped significantly with her symptoms.    11.  The patient will continue  follow up with Dr. Guerin with clinical psychology for her chronic anxiety and depression.  The patient has been advised to seek immediate medical attention if any suicidal/homicidal ideation.  She expressed understanding.  12. CT scans done in May 2018 shows that her metastatic breast cancer continues to be stable/well controlled. I will plan to recheck in Aug 2018.      Return to clinic in 4 weeks with labs to discuss further management and for her next dose of Xgeva.  She knows to call sooner for any new problems or questions.    Richard Delong MD

## 2018-07-05 DIAGNOSIS — F41.9 ANXIETY: ICD-10-CM

## 2018-07-05 RX ORDER — ALPRAZOLAM 0.5 MG/1
0.5 TABLET ORAL 2 TIMES DAILY PRN
Qty: 60 TABLET | Refills: 0 | Status: SHIPPED | OUTPATIENT
Start: 2018-07-05 | End: 2018-08-06 | Stop reason: SDUPTHER

## 2018-07-06 ENCOUNTER — HOSPITAL ENCOUNTER (EMERGENCY)
Facility: HOSPITAL | Age: 48
Discharge: HOME | End: 2018-07-06
Attending: EMERGENCY MEDICINE
Payer: MEDICAID

## 2018-07-06 VITALS
HEIGHT: 67 IN | TEMPERATURE: 98 F | BODY MASS INDEX: 33.15 KG/M2 | HEART RATE: 68 BPM | RESPIRATION RATE: 18 BRPM | WEIGHT: 211.19 LBS | DIASTOLIC BLOOD PRESSURE: 75 MMHG | SYSTOLIC BLOOD PRESSURE: 124 MMHG | OXYGEN SATURATION: 98 %

## 2018-07-06 DIAGNOSIS — R07.9 CHEST PAIN: ICD-10-CM

## 2018-07-06 DIAGNOSIS — R10.2 PELVIC PAIN: Primary | ICD-10-CM

## 2018-07-06 LAB
ALBUMIN SERPL BCP-MCNC: 3.9 G/DL
ALP SERPL-CCNC: 70 U/L
ALT SERPL W/O P-5'-P-CCNC: 16 U/L
ANION GAP SERPL CALC-SCNC: 12 MMOL/L
AST SERPL-CCNC: 12 U/L
BACTERIA #/AREA URNS HPF: ABNORMAL /HPF
BASOPHILS # BLD AUTO: 0.01 K/UL
BASOPHILS NFR BLD: 0.3 %
BILIRUB SERPL-MCNC: 0.6 MG/DL
BILIRUB UR QL STRIP: NEGATIVE
BUN SERPL-MCNC: 10 MG/DL
CALCIUM SERPL-MCNC: 9.2 MG/DL
CHLORIDE SERPL-SCNC: 106 MMOL/L
CLARITY UR: CLEAR
CO2 SERPL-SCNC: 21 MMOL/L
COLOR UR: YELLOW
CREAT SERPL-MCNC: 1.2 MG/DL
DIFFERENTIAL METHOD: ABNORMAL
EOSINOPHIL # BLD AUTO: 0 K/UL
EOSINOPHIL NFR BLD: 1 %
ERYTHROCYTE [DISTWIDTH] IN BLOOD BY AUTOMATED COUNT: 14.2 %
EST. GFR  (AFRICAN AMERICAN): >60 ML/MIN/1.73 M^2
EST. GFR  (NON AFRICAN AMERICAN): 53 ML/MIN/1.73 M^2
GLUCOSE SERPL-MCNC: 107 MG/DL
GLUCOSE UR QL STRIP: NEGATIVE
HCT VFR BLD AUTO: 34.4 %
HGB BLD-MCNC: 12.4 G/DL
HGB UR QL STRIP: ABNORMAL
KETONES UR QL STRIP: NEGATIVE
LEUKOCYTE ESTERASE UR QL STRIP: ABNORMAL
LYMPHOCYTES # BLD AUTO: 1.2 K/UL
LYMPHOCYTES NFR BLD: 39.1 %
MCH RBC QN AUTO: 39.4 PG
MCHC RBC AUTO-ENTMCNC: 36 G/DL
MCV RBC AUTO: 109 FL
MICROSCOPIC COMMENT: ABNORMAL
MONOCYTES # BLD AUTO: 0.3 K/UL
MONOCYTES NFR BLD: 10.3 %
NEUTROPHILS # BLD AUTO: 1.5 K/UL
NEUTROPHILS NFR BLD: 49.3 %
NITRITE UR QL STRIP: NEGATIVE
PH UR STRIP: 6 [PH] (ref 5–8)
PLATELET # BLD AUTO: 157 K/UL
PMV BLD AUTO: 10.4 FL
POTASSIUM SERPL-SCNC: 3.9 MMOL/L
PROT SERPL-MCNC: 7.3 G/DL
PROT UR QL STRIP: NEGATIVE
RBC # BLD AUTO: 3.15 M/UL
RBC #/AREA URNS HPF: 5 /HPF (ref 0–4)
SODIUM SERPL-SCNC: 139 MMOL/L
SP GR UR STRIP: 1.02 (ref 1–1.03)
SQUAMOUS #/AREA URNS HPF: 8 /HPF
URN SPEC COLLECT METH UR: ABNORMAL
UROBILINOGEN UR STRIP-ACNC: 1 EU/DL
WBC # BLD AUTO: 3.12 K/UL
WBC #/AREA URNS HPF: 6 /HPF (ref 0–5)

## 2018-07-06 PROCEDURE — 85025 COMPLETE CBC W/AUTO DIFF WBC: CPT

## 2018-07-06 PROCEDURE — 99284 EMERGENCY DEPT VISIT MOD MDM: CPT | Mod: 25

## 2018-07-06 PROCEDURE — 93005 ELECTROCARDIOGRAM TRACING: CPT

## 2018-07-06 PROCEDURE — 25000003 PHARM REV CODE 250: Performed by: EMERGENCY MEDICINE

## 2018-07-06 PROCEDURE — 80053 COMPREHEN METABOLIC PANEL: CPT

## 2018-07-06 PROCEDURE — 93010 ELECTROCARDIOGRAM REPORT: CPT | Mod: ,,, | Performed by: INTERNAL MEDICINE

## 2018-07-06 PROCEDURE — 81000 URINALYSIS NONAUTO W/SCOPE: CPT

## 2018-07-06 RX ORDER — HYDROCODONE BITARTRATE AND ACETAMINOPHEN 5; 325 MG/1; MG/1
1 TABLET ORAL
Status: COMPLETED | OUTPATIENT
Start: 2018-07-06 | End: 2018-07-06

## 2018-07-06 RX ADMIN — HYDROCODONE BITARTRATE AND ACETAMINOPHEN 1 TABLET: 5; 325 TABLET ORAL at 06:07

## 2018-07-06 NOTE — ED PROVIDER NOTES
SCRIBE #1 NOTE: I, Hali Salgado, am scribing for, and in the presence of, Colten Cade MD. I have scribed the entire note.      History      Chief Complaint   Patient presents with    Fatigue     x 3 days. +decreased appetite and nausea    Pelvic Pain     L pelvic pain. had subcutaneous mass removed in March.        Review of patient's allergies indicates:   Allergen Reactions    Nsaids (non-steroidal anti-inflammatory drug) Other (See Comments)     Instructed not to take NSAID drugs with chemo pill.    Vancomycin analogues Itching        HPI   HPI    7/6/2018, 4:43 PM   History obtained from the patient      History of Present Illness: Josey Flores is a 47 y.o. female patient who presents to the Emergency Department for pelvic pain which onset gradually 2-3 days PTA. She rates her pain an 8/10 in severity. Symptoms are constant and moderate in severity. No mitigating or exacerbating factors reported. Associated sxs include fatigue, chills. Patient denies any fever, n/v/d, vaginal bleeding/discharge, dysuria, hematuria, frequency, flank pain, and all other sxs at this time. Prior Tx includes NORCO with no relief, she has not taken any medication today. No further complaints or concerns at this time.       Arrival mode: Personal vehicle     PCP: Aileen Sadler MD       Past Medical History:  Past Medical History:   Diagnosis Date    Anxiety     Asthma     Cancer     right breast, metastatic-lymph nodes, bone, and thyroid     COPD (chronic obstructive pulmonary disease)     Depression     History of psychiatric hospitalization     2000; suicidal ideation    Hx of psychiatric care     Hypothyroidism     Miscarriage     five miscarriages    Obesity     Psychiatric problem     Thyroid cancer 2017       Past Surgical History:  Past Surgical History:   Procedure Laterality Date    ABSCESS DRAINAGE      bilateral axilla    ADENOIDECTOMY      APPENDECTOMY      CHOLECYSTECTOMY      MASS  EXCISION      SKIN GRAFT  06/16/2017    THYROIDECTOMY Bilateral     TONSILLECTOMY           Family History:  Family History   Problem Relation Age of Onset    Arthritis Mother     Early death Mother         64 at time of death    Heart attack Mother     Heart disease Mother     Heart failure Mother     Hypertension Mother     Coronary artery disease Father     Hearing loss Father     Heart disease Father     Hyperlipidemia Father     Hypertension Father     Mental illness Father     Learning disabilities Son        Social History:  Social History     Social History Main Topics    Smoking status: Current Every Day Smoker     Packs/day: 1.00     Years: 30.00     Types: Cigarettes     Last attempt to quit: 6/11/2017    Smokeless tobacco: Never Used      Comment: 45 pack year history    Alcohol use No    Drug use: No    Sexual activity: Yes     Partners: Male       ROS   Review of Systems   Constitutional: Positive for chills and fatigue. Negative for fever.   HENT: Negative for sore throat.    Respiratory: Negative for shortness of breath.    Cardiovascular: Negative for chest pain.   Gastrointestinal: Negative for nausea and vomiting.   Genitourinary: Positive for pelvic pain. Negative for dysuria, flank pain, frequency, hematuria, vaginal bleeding and vaginal discharge.   Musculoskeletal: Negative for back pain.   Skin: Negative for rash.   Neurological: Negative for weakness.   Hematological: Does not bruise/bleed easily.   All other systems reviewed and are negative.    Physical Exam      Initial Vitals [07/06/18 1638]   BP Pulse Resp Temp SpO2   136/74 86 18 98 °F (36.7 °C) 98 %      MAP       --          Physical Exam  Nursing Notes and Vital Signs Reviewed.  Constitutional: Patient is in no acute distress. Well-developed and well-nourished.  Head: Atraumatic. Normocephalic.  Eyes: PERRL. EOM intact. Conjunctivae are not pale. No scleral icterus.  ENT: Mucous membranes are moist. Oropharynx  "is clear and symmetric.    Neck: Supple. Full ROM. No lymphadenopathy.  Cardiovascular: Regular rate. Regular rhythm. No murmurs, rubs, or gallops. Distal pulses are 2+ and symmetric.  Pulmonary/Chest: No respiratory distress. Clear to auscultation bilaterally. No wheezing or rales.  Abdominal: Soft and non-distended.  There is no tenderness.  No rebound, guarding, or rigidity. Good bowel sounds.   : Scar to L inguinal crease with small area of slight inflammation. No palpable fluctuance or induration. TTP to L inguinal area.   Musculoskeletal: Moves all extremities. No obvious deformities. No edema. No calf tenderness.  Skin: Warm and dry.  Neurological:  Alert, awake, and appropriate.  Normal speech.  No acute focal neurological deficits are appreciated.  Psychiatric: Normal affect. Good eye contact. Appropriate in content.    ED Course    Procedures  ED Vital Signs:  Vitals:    07/06/18 1638 07/06/18 1827   BP: 136/74 124/75   Pulse: 86 68   Resp: 18 18   Temp: 98 °F (36.7 °C) 98.1 °F (36.7 °C)   TempSrc: Oral Oral   SpO2: 98%    Weight: 95.8 kg (211 lb 3.2 oz)    Height: 5' 7" (1.702 m)        Abnormal Lab Results:  Labs Reviewed   CBC W/ AUTO DIFFERENTIAL - Abnormal; Notable for the following:        Result Value    WBC 3.12 (*)     RBC 3.15 (*)     Hematocrit 34.4 (*)      (*)     MCH 39.4 (*)     Gran # (ANC) 1.5 (*)     All other components within normal limits   COMPREHENSIVE METABOLIC PANEL - Abnormal; Notable for the following:     CO2 21 (*)     eGFR if non  53 (*)     All other components within normal limits   URINALYSIS - Abnormal; Notable for the following:     Occult Blood UA 3+ (*)     Leukocytes, UA 1+ (*)     All other components within normal limits   URINALYSIS MICROSCOPIC - Abnormal; Notable for the following:     RBC, UA 5 (*)     WBC, UA 6 (*)     Bacteria, UA Few (*)     All other components within normal limits        All Lab Results:  Results for orders placed or " performed during the hospital encounter of 07/06/18   CBC auto differential   Result Value Ref Range    WBC 3.12 (L) 3.90 - 12.70 K/uL    RBC 3.15 (L) 4.00 - 5.40 M/uL    Hemoglobin 12.4 12.0 - 16.0 g/dL    Hematocrit 34.4 (L) 37.0 - 48.5 %     (H) 82 - 98 fL    MCH 39.4 (H) 27.0 - 31.0 pg    MCHC 36.0 32.0 - 36.0 g/dL    RDW 14.2 11.5 - 14.5 %    Platelets 157 150 - 350 K/uL    MPV 10.4 9.2 - 12.9 fL    Gran # (ANC) 1.5 (L) 1.8 - 7.7 K/uL    Lymph # 1.2 1.0 - 4.8 K/uL    Mono # 0.3 0.3 - 1.0 K/uL    Eos # 0.0 0.0 - 0.5 K/uL    Baso # 0.01 0.00 - 0.20 K/uL    Gran% 49.3 38.0 - 73.0 %    Lymph% 39.1 18.0 - 48.0 %    Mono% 10.3 4.0 - 15.0 %    Eosinophil% 1.0 0.0 - 8.0 %    Basophil% 0.3 0.0 - 1.9 %    Differential Method Automated    Comprehensive metabolic panel   Result Value Ref Range    Sodium 139 136 - 145 mmol/L    Potassium 3.9 3.5 - 5.1 mmol/L    Chloride 106 95 - 110 mmol/L    CO2 21 (L) 23 - 29 mmol/L    Glucose 107 70 - 110 mg/dL    BUN, Bld 10 6 - 20 mg/dL    Creatinine 1.2 0.5 - 1.4 mg/dL    Calcium 9.2 8.7 - 10.5 mg/dL    Total Protein 7.3 6.0 - 8.4 g/dL    Albumin 3.9 3.5 - 5.2 g/dL    Total Bilirubin 0.6 0.1 - 1.0 mg/dL    Alkaline Phosphatase 70 55 - 135 U/L    AST 12 10 - 40 U/L    ALT 16 10 - 44 U/L    Anion Gap 12 8 - 16 mmol/L    eGFR if African American >60 >60 mL/min/1.73 m^2    eGFR if non African American 53 (A) >60 mL/min/1.73 m^2   Urinalysis Clean Catch   Result Value Ref Range    Specimen UA Urine, Clean Catch     Color, UA Yellow Yellow, Straw, [place]    Appearance, UA Clear Clear    pH, UA 6.0 5.0 - 8.0    Specific Gravity, UA 1.020 1.005 - 1.030    Protein, UA Negative Negative    Glucose, UA Negative Negative    Ketones, UA Negative Negative    Bilirubin (UA) Negative Negative    Occult Blood UA 3+ (A) Negative    Nitrite, UA Negative Negative    Urobilinogen, UA 1.0 <2.0 EU/dL    Leukocytes, UA 1+ (A) Negative   Urinalysis Microscopic   Result Value Ref Range    RBC, UA 5 (H) 0  - 4 /hpf    WBC, UA 6 (H) 0 - 5 /hpf    Bacteria, UA Few (A) None-Occ /hpf    Squam Epithel, UA 8 /hpf    Microscopic Comment SEE COMMENT        The EKG was ordered, reviewed, and independently interpreted by the ED provider.  Interpretation time: 1703  Rate: 75 BPM  Rhythm: normal sinus rhythm  Interpretation: No acute ST changes. No STEMI.           The Emergency Provider reviewed the vital signs and test results, which are outlined above.    ED Discussion     8:37 PM: Pt is A&O x3, appropriate and competent at this time. Pt voices desire to leave hospital. Informed pt multiple times that there was a critical trauma patient that required my attention, she expressed understanding during those times. Pt stated she wanted to leave AMA prior to the pelvic exam. D/w pt in length need for further evaluation and treatment due to HPI and PEx. Pt declines any further evaluation or tx at this time. All risks, including worsening sx, permanent bodily harm and death, were discussed in length. Pt acknowledges all risk at this time and agrees to sign AMA form. Pt given RTER instructions. All questions and concerns addressed at this time. Pt leaving AMA.     ED Medication(s):  Medications   HYDROcodone-acetaminophen 5-325 mg per tablet 1 tablet (1 tablet Oral Given 7/6/18 2576)       Discharge Medication List as of 7/6/2018  8:45 PM                Medical Decision Making    Medical Decision Making:   Clinical Tests:   Lab Tests: Ordered and Reviewed  Medical Tests: Ordered and Reviewed           Scribe Attestation:   Scribe #1: I performed the above scribed service and the documentation accurately describes the services I performed. I attest to the accuracy of the note.    Attending:   Physician Attestation Statement for Scribe #1: I, Colten Cade MD, personally performed the services described in this documentation, as scribed by Hali Salgado, in my presence, and it is both accurate and complete.          Clinical  Impression       ICD-10-CM ICD-9-CM   1. Pelvic pain R10.2 [id number]   2. Chest pain R07.9 786.50       Disposition:   Disposition: AMA  AMA: Patient left AMA after: lab resulted. ED DISP AMA LIST2: pelvic exam. Patient status: competent, oriented x 3 and not intoxicated. The family was present when AMA discussions took place. AMA Form: signed by patient          Colten Cade MD  07/20/18 8206

## 2018-07-07 NOTE — ED NOTES
Pt requested to leave AMA. Dr Cade notified. PIV removed, catheter intact, no bruising noted. Pt explained risks and benefits. AMA formed signed and placed in chart.

## 2018-07-20 ENCOUNTER — INFUSION (OUTPATIENT)
Dept: INFUSION THERAPY | Facility: HOSPITAL | Age: 48
End: 2018-07-20
Attending: INTERNAL MEDICINE
Payer: MEDICAID

## 2018-07-20 ENCOUNTER — OFFICE VISIT (OUTPATIENT)
Dept: HEMATOLOGY/ONCOLOGY | Facility: CLINIC | Age: 48
End: 2018-07-20
Payer: MEDICAID

## 2018-07-20 VITALS
WEIGHT: 212.75 LBS | HEIGHT: 67 IN | TEMPERATURE: 98 F | HEART RATE: 82 BPM | BODY MASS INDEX: 33.39 KG/M2 | SYSTOLIC BLOOD PRESSURE: 125 MMHG | DIASTOLIC BLOOD PRESSURE: 82 MMHG | OXYGEN SATURATION: 98 %

## 2018-07-20 DIAGNOSIS — Z17.0 MALIGNANT NEOPLASM OF OVERLAPPING SITES OF RIGHT BREAST IN FEMALE, ESTROGEN RECEPTOR POSITIVE: Primary | ICD-10-CM

## 2018-07-20 DIAGNOSIS — C77.3 MALIGNANT NEOPLASM METASTATIC TO LYMPH NODE OF AXILLA: ICD-10-CM

## 2018-07-20 DIAGNOSIS — Z17.0 MALIGNANT NEOPLASM OF OVERLAPPING SITES OF RIGHT BREAST IN FEMALE, ESTROGEN RECEPTOR POSITIVE: ICD-10-CM

## 2018-07-20 DIAGNOSIS — H43.393 VITREOUS FLOATERS OF BOTH EYES: ICD-10-CM

## 2018-07-20 DIAGNOSIS — C79.51 SECONDARY CANCER OF BONE: Primary | ICD-10-CM

## 2018-07-20 DIAGNOSIS — C50.811 MALIGNANT NEOPLASM OF OVERLAPPING SITES OF RIGHT BREAST IN FEMALE, ESTROGEN RECEPTOR POSITIVE: ICD-10-CM

## 2018-07-20 DIAGNOSIS — C79.51 SECONDARY CANCER OF BONE: ICD-10-CM

## 2018-07-20 DIAGNOSIS — C50.811 MALIGNANT NEOPLASM OF OVERLAPPING SITES OF RIGHT BREAST IN FEMALE, ESTROGEN RECEPTOR POSITIVE: Primary | ICD-10-CM

## 2018-07-20 DIAGNOSIS — G89.3 NEOPLASM RELATED PAIN: ICD-10-CM

## 2018-07-20 DIAGNOSIS — H43.393 FLOATERS IN VISUAL FIELD, BILATERAL: ICD-10-CM

## 2018-07-20 PROCEDURE — 99999 PR PBB SHADOW E&M-EST. PATIENT-LVL IV: CPT | Mod: PBBFAC,,, | Performed by: INTERNAL MEDICINE

## 2018-07-20 PROCEDURE — 63600175 PHARM REV CODE 636 W HCPCS: Mod: JG | Performed by: INTERNAL MEDICINE

## 2018-07-20 PROCEDURE — 99215 OFFICE O/P EST HI 40 MIN: CPT | Mod: 25,S$PBB,, | Performed by: INTERNAL MEDICINE

## 2018-07-20 PROCEDURE — 99214 OFFICE O/P EST MOD 30 MIN: CPT | Mod: PBBFAC,25 | Performed by: INTERNAL MEDICINE

## 2018-07-20 PROCEDURE — 96372 THER/PROPH/DIAG INJ SC/IM: CPT

## 2018-07-20 RX ORDER — HYDROCODONE BITARTRATE AND ACETAMINOPHEN 7.5; 325 MG/1; MG/1
1 TABLET ORAL EVERY 6 HOURS PRN
Qty: 120 TABLET | Refills: 0 | Status: SHIPPED | OUTPATIENT
Start: 2018-07-20 | End: 2018-08-20 | Stop reason: SDUPTHER

## 2018-07-20 RX ADMIN — DENOSUMAB 120 MG: 120 INJECTION SUBCUTANEOUS at 12:07

## 2018-07-20 NOTE — PROGRESS NOTES
Reason for visit: Right breast carcinoma  Date of Diagnosis: January 12, 2017    HPI:   The patient is a 47-year-old  female who presents to the hematology oncology clinic today for follow up for metastatic right breast carcinoma.    I have reviewed all of the patient's relevant clinical history available in the medical record and have utilized this in my evaluation and management recommendations today.  The patient had 2 separate areas in the right breast that were biopsied area the first was a right breast mass at 11:00 which was 3 cm from the nipple which showed invasive mammary carcinoma.  The invasive carcinoma measured at least 1.2 cm in greatest dimension and appeared high-grade.  This tumor was ER positive/OR positive/HER-2 negative.  The second breast mass was biopsied from the retroperitoneal area lower area and was also positive for invasive mammary carcinoma.  This measured at least 0.4 cm and also appeared high-grade.  The tumor is morphologically very similar to the tumor in the prior specimen.  Receptor studies were not done on this specimen.  The patient also had a palpable right axillary lymph node which was biopsied and showed metastatic mammary carcinoma which measured at least 1.3 cm in greatest dimension.  The patient had self palpated this breast mass.  In addition the patient has a palpable mass in the region of her right elbow which she reports has been present for 9 years but had been slowly growing especially over the past year or 2.  She denies any pain associated with this.    Most recently she is status post total thyroidectomy for papillary thyroid carcinoma.  Today the patient reports that she continues to have lower back pain.  She has been having chronic problems with anxiety and depression.  However she denies any suicidal or homicidal ideation.  She reports chronic fatigue. She is taking norco PRN neoplasm related pain.  She denies any fevers or chills. Denies night  sweats.  She denies any melena, hematochezia, hematemesis, hemoptysis or hematuria. She denies nausea. Denies vomiting or abdominal pain.  She denies cough.  She continues on ibrance and letrozole. Ibrance was started on 2/21/17.  She reports having had an episode of presyncope a few days ago.  She thinks that might have been related to dehydration.  It has not recurred.  She also reports that in the past 2 weeks she has noticed floaters in both her eyes.    PAST MEDICAL HISTORY:   1. HSIL on pap smear s/p LEEP    SURGICAL HISTORY:   1.  Tonsillectomy and adenoidectomy  2.  Appendectomy  3.  Cholecystectomy  4.  D&C  5.  Thyroidectomy on August 31, 2017  6.  Right axillary hydradenitis/cellulitis and left inguinal hydradenitis/cellulitis s/p debridement and skin grafting    FAMILY HISTORY: She reports that her maternal aunt had lung cancer in her 70s.  She used to smoke cigarettes.  Her maternal cousin had lung cancer in her 60s.  She used to smoke cigarettes.  She denies any other immediate family members with cancer or bleeding/clotting disorders.    SOCIAL HISTORY: She reports a 46+ pack year smoking history. She drinks wine occasionally.  She denies any history of recreational drug use.  She is engaged and has 2 sons.  She used to work for [employer].  She lives in [place], [place].     ALLERGIES: [NKDA]    MEDICATIONS: [Medcard has been reviewed and/or reconciled.]    REVIEW OF SYSTEMS:   GENERAL: [No fevers, chills or sweats. Reports fatigue. Reports weight gain. Denies loss of appetite.]  HEENT: [No blurred vision, tinnitus, nasal discharge, sorethroat or dysphagia.]  HEART: [No chest pain, palpitations or shortness of breath.]    LUNGS: [Reports chronic cough. Denies hemoptysis or breathing problems.]  ABDOMEN: [No abdominal pain, nausea, vomiting, diarrhea, constipation or melena.]  GENITOURINARY: [No dysuria, bleeding or malodorous discharge.]  NEURO: [Reports headache. Denies dizziness or  vertigo.]  HEMATOLOGY: [No easy bruising, spontaneous bleeding or blood clots in the past].  MUSCULOSKELETAL: [Reports arthralgias, myalgias and bone pains.]  SKIN: [No rashes or skin lesions.]  PSYCHIATRY: [Reports depression. Reports anxiety.]  Denies suicidal/homicidal ideation.    PHYSICAL EXAMINATION:   VS: Reviewed on nurse's notes.  APPEARANCE: The patient is a well-developed, well-nourished and well-groomed  female who appears in no acute distress.   HEENT: No scleral icterus. Both external auditory canals clear. No oral ulcers, lesions. Throat clear  HEAD: No sinus tenderness.  NECK: Supple. No palpable lymphadenopathy. Thyroid non-tender, no palpable masses  CHEST: Breath sounds clear bilaterally. No rales. No rhonchi. Unlabored respirations.  BREAST: Deferred today.  CARDIOVASCULAR: Normal S1, S2. Normal rate. Regular rhythm.  ABDOMEN: Bowel sounds normal. No tenderness. No abdominal distention. No hepatomegaly. No splenomegaly.  BACK: Palpable tenderness in the lower back.  LYMPHATIC: No palpable supraclavicular lymphadenopathy. Bilateral axillary healed wounds noted.  EXTREMITIES: No clubbing, cyanosis. Palpable firm 3 cm mass in the right elbow is noted. Trace edema in bilateral lower extremities.      SKIN: No lesions. No petechiae. No ecchymoses.   NEUROLOGIC: No focal findings. Alert & Oriented x 3. Mood appropriate to affect    LABS:   Reviewed    IMAGING:  Reviewed    IMPRESSION:  1.  Metastatic multifocal infiltrating ductal carcinoma of the right breast with right axillary and bone involvement [ER positive/MT positive/HER-2 negative]  2.  Papillary thyroid carcinoma  3.  Tobacco abuse  4.  Right elbow mass  5.  Anxiety and depression  6.  Floaters in both visual fields    PLAN:  1.  I had a detailed discussion with the patient previously with regard to the diagnosis, prognosis and treatment options for metastatic multifocal invasive ductal carcinoma of the right breast with right  axillary and bone involvement.  2.  I have discussed that at this time her metastatic malignancy is potentially treatable but not curable.  3.  The patient was again strongly encouraged to quit smoking.  She expressed understanding.  Prescription for Xanax when necessary anxiety was previously given to the patient after full discussion of sedation precautions.    4. FSH and estradiol levels confirm menopausal status. TSH lab was normal.   5.  MRI of the brain on 1/31/17 to evaluate for any evidence of metastatic disease to complete staging workup was negative for metastatic malignancy to the brain. CT head done in March 2017 in ED was also ok.  6. Continue to proceed with ibrance/letrozole.  She appears to be tolerating this well so far with no significant side effects.   7. She has been advised to take calcium and vit d supplementation everyday. She is on xgeva injections for treatment of bone involvement by malignancy. Risks/benefits and common side effects discussed and she expressed understanding and agreement. She has dentures.  She will proceed with her next scheduled dose of Xgeva on 5/23/18.  8. Continue Lasix PRN for treatment of bilateral lower extremity edema.  Supplemental potassium was given as well.  Advised to use compression stockings and elevate legs.  9.  She will continue follow up with with Dr. Jason with endocrinology for follow up of papillary thyroid carcinoma.    10.  Results of MRI of the thoracic and lumbar spine from 9/21/17 for evaluation of current acute symptoms related to back pain reviewed in detail with pateint. No acute process involving the spinal cord is noted. She does have bone involvement by malignancy in multiple areas. However the PET/CT done on 9/27/17 shows no evidence of new areas of bony or visceral involvement.  Hence there appears to be no evidence of metastatic disease progression.  However since the patient continues to have low back pain and MRI of lumbar spine does  show enhancing marrow replacing metastatic lesions are seen in the bilateral iliac bones posteriorly. She did see radiation oncology and completed palliative XRT to this location which helped significantly with her symptoms.    11.  The patient will continue follow up with Dr. Guerin with clinical psychology for her chronic anxiety and depression.  The patient has been advised to seek immediate medical attention if any suicidal/homicidal ideation.  She expressed understanding.  12. CT scans done in May 2018 shows that her metastatic breast cancer continues to be stable/well controlled. I will plan to recheck in Sep 2018.    13.  Ophthalmology consultation for further evaluation with regard to floaters in both visual fields.    Return to clinic in 4 weeks with labs to discuss further management and for her next dose of Xgeva.  She knows to call sooner for any new problems or questions.    Richard Delong MD

## 2018-07-23 ENCOUNTER — OFFICE VISIT (OUTPATIENT)
Dept: OPHTHALMOLOGY | Facility: CLINIC | Age: 48
End: 2018-07-23
Payer: MEDICAID

## 2018-07-23 DIAGNOSIS — Z17.0 MALIGNANT NEOPLASM OF OVERLAPPING SITES OF RIGHT BREAST IN FEMALE, ESTROGEN RECEPTOR POSITIVE: ICD-10-CM

## 2018-07-23 DIAGNOSIS — C77.3 MALIGNANT NEOPLASM METASTATIC TO LYMPH NODE OF AXILLA: ICD-10-CM

## 2018-07-23 DIAGNOSIS — H34.8320 BRANCH RETINAL VEIN OCCLUSION OF LEFT EYE WITH MACULAR EDEMA: ICD-10-CM

## 2018-07-23 DIAGNOSIS — C79.51 SECONDARY CANCER OF BONE: ICD-10-CM

## 2018-07-23 DIAGNOSIS — H54.7 LOSS OF VISION: Primary | ICD-10-CM

## 2018-07-23 DIAGNOSIS — C73 PAPILLARY THYROID CARCINOMA: ICD-10-CM

## 2018-07-23 DIAGNOSIS — C50.811 MALIGNANT NEOPLASM OF OVERLAPPING SITES OF RIGHT BREAST IN FEMALE, ESTROGEN RECEPTOR POSITIVE: ICD-10-CM

## 2018-07-23 PROCEDURE — 99211 OFF/OP EST MAY X REQ PHY/QHP: CPT | Mod: PBBFAC,PO | Performed by: OPHTHALMOLOGY

## 2018-07-23 PROCEDURE — 92134 CPTRZ OPH DX IMG PST SGM RTA: CPT | Mod: PBBFAC,PO | Performed by: OPHTHALMOLOGY

## 2018-07-23 PROCEDURE — 92004 COMPRE OPH EXAM NEW PT 1/>: CPT | Mod: S$PBB,,, | Performed by: OPHTHALMOLOGY

## 2018-07-23 PROCEDURE — 99999 PR PBB SHADOW E&M-EST. PATIENT-LVL I: CPT | Mod: PBBFAC,,, | Performed by: OPHTHALMOLOGY

## 2018-07-23 NOTE — PROGRESS NOTES
===============================  07/23/2018   Josey Flores,   47 y.o. female   Last visit [facility]: :Visit date not found   Last visit eye dept. Visit date not found  [facility]:  Uncorrected distance visual acuity was 20/20 in the right eye and CF at 3' in the left eye.  Tonometry     Tonometry (Tonopen, 9:23 AM)       Right Left    Pressure 16 14               Not recorded         Not recorded        Chief Complaint   Patient presents with    Eye Problem        HPI     New Patient  Chief complaint: Floaters alternating OD/OS  Dot is in the center of vision OS  Onset: about 2 weeks ago  Floaters are consistent throughout the day  Patient states dots are red, yellow, blue and purple when eyes are closed   Spots are constant with gaze     Patient is on oral chemo being treated for stage 4 cancer metastasized to   lymph nodes, thyroid, and bones.  Has been in remission for 2 years.     Last edited by Saira Doran, OD on 7/23/2018 10:18 AM. (History)          ________________  7/23/2018  Problem List Items Addressed This Visit        Eye/Vision problems    Branch retinal vein occlusion of left eye with macular edema    Relevant Orders    Posterior Segment OCT Retina-Both eyes (Completed)       Other    Malignant neoplasm of overlapping sites of right breast in female, estrogen receptor positive    Malignant neoplasm metastatic to lymph node of axilla    Secondary cancer of bone    Papillary thyroid carcinoma    Loss of vision, left eye - Primary        Ne  Dx os v va  ectesive stage 4 ca    OS BRVO - smoker    OS intraretinal hemorrhage  Focal loss of elipsoid zone  So trial of avastin OS INB follow  rtc next available for avastin OS      .       ===========================

## 2018-07-23 NOTE — LETTER
July 23, 2018      Richard Delong MD  [address] [facility] [place] [address]           [facility] - Ophthalmology  [address] [facility] [place] [address]  Phone: [phone]  Fax: [phone]          Patient: Josey Flores   MR Number: [id number]   YOB: 1970   Date of Visit: 7/23/2018       Dear Dr. Richard Delong:    Thank you for referring Josey Flores to me for evaluation. Attached you will find relevant portions of my assessment and plan of care.    If you have questions, please do not hesitate to call me. I look forward to following Josey Flores along with you.    Sincerely,    ALEXANDRO Chicas MD    Enclosure  CC:  No Recipients    If you would like to receive this communication electronically, please contact [email or web address] or [phone] to request more information on [employer] Link access.    For providers and/or their staff who would like to refer a patient to [facility], please contact us through our one-stop-shop provider referral line, [facility], at [phone].    If you feel you have received this communication in error or would no longer like to receive these types of communications, please e-mail [email or web address]

## 2018-07-30 ENCOUNTER — TELEPHONE (OUTPATIENT)
Dept: OBSTETRICS AND GYNECOLOGY | Facility: CLINIC | Age: 48
End: 2018-07-30

## 2018-07-30 ENCOUNTER — OFFICE VISIT (OUTPATIENT)
Dept: OBSTETRICS AND GYNECOLOGY | Facility: CLINIC | Age: 48
End: 2018-07-30
Payer: MEDICAID

## 2018-07-30 VITALS
DIASTOLIC BLOOD PRESSURE: 70 MMHG | HEIGHT: 67 IN | SYSTOLIC BLOOD PRESSURE: 112 MMHG | BODY MASS INDEX: 33.21 KG/M2 | WEIGHT: 211.63 LBS

## 2018-07-30 DIAGNOSIS — R10.2 PELVIC PAIN: ICD-10-CM

## 2018-07-30 DIAGNOSIS — L02.214 ABSCESS OF GROIN, LEFT: ICD-10-CM

## 2018-07-30 DIAGNOSIS — N95.2 VAGINAL ATROPHY: Primary | ICD-10-CM

## 2018-07-30 PROCEDURE — 99214 OFFICE O/P EST MOD 30 MIN: CPT | Mod: S$PBB,,, | Performed by: OBSTETRICS & GYNECOLOGY

## 2018-07-30 PROCEDURE — 99999 PR PBB SHADOW E&M-EST. PATIENT-LVL III: CPT | Mod: PBBFAC,,, | Performed by: OBSTETRICS & GYNECOLOGY

## 2018-07-30 PROCEDURE — 99213 OFFICE O/P EST LOW 20 MIN: CPT | Mod: PBBFAC,PN | Performed by: OBSTETRICS & GYNECOLOGY

## 2018-07-30 RX ORDER — SULFAMETHOXAZOLE AND TRIMETHOPRIM 400; 80 MG/1; MG/1
1 TABLET ORAL 2 TIMES DAILY
Qty: 20 TABLET | Refills: 0 | Status: SHIPPED | OUTPATIENT
Start: 2018-07-30 | End: 2018-08-09

## 2018-07-30 NOTE — TELEPHONE ENCOUNTER
----- Message from Geraldine Arora sent at 7/30/2018  1:15 PM CDT -----  Contact: pt  Calling in regards to Rx medication for antibiotic and it has not been sent yet to the [facility] [place] on [address] and please advise [phone]

## 2018-07-30 NOTE — PROGRESS NOTES
Subjective:       Patient ID: Josey Flores is a 47 y.o. female.    Chief Complaint:  Follow-up and Pelvic Pain      History of Present Illness  HPI  here for problem   C/o lower pelvic pains for the past month  Reports pains feel like menstrual cramps in lower abdomen but no vaginal bleeding  Pain unrelieved with pain medication (norco);   No intercourse  Denies vaginal discharge  Denies fever/chills  Denies change in bowel/bladder function  Also c/o drainage from crease in groin (recurrent since inclusion cyst removed earlier in the year)--feels she is having another boil    GYN & OB History  No LMP recorded (lmp unknown). Patient is postmenopausal.   Date of Last Pap: 2017    OB History    Para Term  AB Living   5 2     3     SAB TAB Ectopic Multiple Live Births   3              # Outcome Date GA Lbr Bassam/2nd Weight Sex Delivery Anes PTL Lv   5 SAB            4 SAB            3 SAB            2 Para            1 Para                   Review of Systems  Review of Systems   Genitourinary: Positive for dyspareunia, pelvic pain and vaginal pain.           Objective:    Physical Exam:   Constitutional: She appears well-developed.     Eyes: Conjunctivae and EOM are normal. Pupils are equal, round, and reactive to light.    Neck: Normal range of motion. Neck supple.     Pulmonary/Chest: Effort normal. Right breast exhibits no mass, no nipple discharge, no skin change, no tenderness and presence. Left breast exhibits no mass, no nipple discharge, no skin change, no tenderness and presence. Breasts are symmetrical.        Abdominal: Soft.     Genitourinary: Uterus normal. Pelvic exam was performed with patient supine. No vaginal discharge (scant) found.   Genitourinary Comments: atrophic           Musculoskeletal: Normal range of motion.       Neurological: She is alert.    Skin: Skin is warm.    Psychiatric: She has a normal mood and affect.          Assessment:        1. Vaginal atrophy    2.  Pelvic pain               Plan:       Suspect pelvic pain due to vaginal atrophy;   Pelvic sono ordered to eval ut/adnexa for etiol of pelvic pain--ct scan 5/2018 wnl; suspect will have normal pelvis  Advised stop norco as may be making pain worse --disrupting pain receptors, nervous system  If pelvic sono wnl; advised replens  Pap due after 8/2018

## 2018-08-01 ENCOUNTER — PROCEDURE VISIT (OUTPATIENT)
Dept: OBSTETRICS AND GYNECOLOGY | Facility: CLINIC | Age: 48
End: 2018-08-01
Payer: MEDICAID

## 2018-08-01 DIAGNOSIS — N95.0 POST-MENOPAUSAL BLEEDING: ICD-10-CM

## 2018-08-01 DIAGNOSIS — R10.2 PELVIC PAIN: ICD-10-CM

## 2018-08-01 DIAGNOSIS — N95.2 VAGINAL ATROPHY: ICD-10-CM

## 2018-08-01 PROCEDURE — 76856 US EXAM PELVIC COMPLETE: CPT | Mod: 26,S$PBB,, | Performed by: OBSTETRICS & GYNECOLOGY

## 2018-08-01 PROCEDURE — 76856 US EXAM PELVIC COMPLETE: CPT | Mod: PBBFAC,PN | Performed by: OBSTETRICS & GYNECOLOGY

## 2018-08-03 DIAGNOSIS — F41.9 ANXIETY: ICD-10-CM

## 2018-08-03 DIAGNOSIS — F32.A DEPRESSION, UNSPECIFIED DEPRESSION TYPE: ICD-10-CM

## 2018-08-03 DIAGNOSIS — C77.3 MALIGNANT NEOPLASM METASTATIC TO LYMPH NODE OF AXILLA: ICD-10-CM

## 2018-08-03 DIAGNOSIS — C50.811 MALIGNANT NEOPLASM OF OVERLAPPING SITES OF RIGHT BREAST IN FEMALE, ESTROGEN RECEPTOR POSITIVE: ICD-10-CM

## 2018-08-03 DIAGNOSIS — C79.51 SECONDARY CANCER OF BONE: ICD-10-CM

## 2018-08-03 DIAGNOSIS — G89.3 NEOPLASM RELATED PAIN: ICD-10-CM

## 2018-08-03 DIAGNOSIS — Z17.0 MALIGNANT NEOPLASM OF OVERLAPPING SITES OF RIGHT BREAST IN FEMALE, ESTROGEN RECEPTOR POSITIVE: ICD-10-CM

## 2018-08-04 RX ORDER — LETROZOLE 2.5 MG/1
TABLET, FILM COATED ORAL
Qty: 90 TABLET | Refills: 1 | Status: SHIPPED | OUTPATIENT
Start: 2018-08-04 | End: 2019-01-14 | Stop reason: SDUPTHER

## 2018-08-06 DIAGNOSIS — F41.9 ANXIETY: ICD-10-CM

## 2018-08-06 RX ORDER — ALPRAZOLAM 0.5 MG/1
0.5 TABLET ORAL 2 TIMES DAILY PRN
Qty: 60 TABLET | Refills: 0 | Status: SHIPPED | OUTPATIENT
Start: 2018-08-06 | End: 2018-09-05 | Stop reason: SDUPTHER

## 2018-08-09 DIAGNOSIS — C50.811 MALIGNANT NEOPLASM OF OVERLAPPING SITES OF RIGHT BREAST IN FEMALE, ESTROGEN RECEPTOR POSITIVE: ICD-10-CM

## 2018-08-09 DIAGNOSIS — C77.3 MALIGNANT NEOPLASM METASTATIC TO LYMPH NODE OF AXILLA: ICD-10-CM

## 2018-08-09 DIAGNOSIS — C79.51 SECONDARY CANCER OF BONE: ICD-10-CM

## 2018-08-09 DIAGNOSIS — G89.3 NEOPLASM RELATED PAIN: ICD-10-CM

## 2018-08-09 DIAGNOSIS — Z17.0 MALIGNANT NEOPLASM OF OVERLAPPING SITES OF RIGHT BREAST IN FEMALE, ESTROGEN RECEPTOR POSITIVE: ICD-10-CM

## 2018-08-09 RX ORDER — PALBOCICLIB 125 MG/1
CAPSULE ORAL
Qty: 21 CAPSULE | Refills: 2 | Status: SHIPPED | OUTPATIENT
Start: 2018-08-09 | End: 2018-10-31 | Stop reason: SDUPTHER

## 2018-08-15 ENCOUNTER — INFUSION (OUTPATIENT)
Dept: INFUSION THERAPY | Facility: HOSPITAL | Age: 48
End: 2018-08-15
Attending: INTERNAL MEDICINE
Payer: MEDICAID

## 2018-08-15 ENCOUNTER — OFFICE VISIT (OUTPATIENT)
Dept: HEMATOLOGY/ONCOLOGY | Facility: CLINIC | Age: 48
End: 2018-08-15
Payer: MEDICAID

## 2018-08-15 VITALS
BODY MASS INDEX: 33.28 KG/M2 | HEIGHT: 67 IN | SYSTOLIC BLOOD PRESSURE: 120 MMHG | WEIGHT: 212.06 LBS | OXYGEN SATURATION: 97 % | TEMPERATURE: 98 F | DIASTOLIC BLOOD PRESSURE: 67 MMHG | HEART RATE: 96 BPM

## 2018-08-15 DIAGNOSIS — C79.51 SECONDARY CANCER OF BONE: Primary | ICD-10-CM

## 2018-08-15 DIAGNOSIS — C50.811 MALIGNANT NEOPLASM OF OVERLAPPING SITES OF RIGHT BREAST IN FEMALE, ESTROGEN RECEPTOR POSITIVE: ICD-10-CM

## 2018-08-15 DIAGNOSIS — C79.51 SECONDARY CANCER OF BONE: ICD-10-CM

## 2018-08-15 DIAGNOSIS — G89.3 NEOPLASM RELATED PAIN: ICD-10-CM

## 2018-08-15 DIAGNOSIS — C77.3 MALIGNANT NEOPLASM METASTATIC TO LYMPH NODE OF AXILLA: ICD-10-CM

## 2018-08-15 DIAGNOSIS — C50.811 MALIGNANT NEOPLASM OF OVERLAPPING SITES OF RIGHT BREAST IN FEMALE, ESTROGEN RECEPTOR POSITIVE: Primary | ICD-10-CM

## 2018-08-15 DIAGNOSIS — Z17.0 MALIGNANT NEOPLASM OF OVERLAPPING SITES OF RIGHT BREAST IN FEMALE, ESTROGEN RECEPTOR POSITIVE: ICD-10-CM

## 2018-08-15 DIAGNOSIS — Z17.0 MALIGNANT NEOPLASM OF OVERLAPPING SITES OF RIGHT BREAST IN FEMALE, ESTROGEN RECEPTOR POSITIVE: Primary | ICD-10-CM

## 2018-08-15 PROCEDURE — 99215 OFFICE O/P EST HI 40 MIN: CPT | Mod: 25,S$PBB,, | Performed by: INTERNAL MEDICINE

## 2018-08-15 PROCEDURE — 99213 OFFICE O/P EST LOW 20 MIN: CPT | Mod: PBBFAC,25 | Performed by: INTERNAL MEDICINE

## 2018-08-15 PROCEDURE — 99999 PR PBB SHADOW E&M-EST. PATIENT-LVL III: CPT | Mod: PBBFAC,,, | Performed by: INTERNAL MEDICINE

## 2018-08-15 PROCEDURE — 96372 THER/PROPH/DIAG INJ SC/IM: CPT

## 2018-08-15 PROCEDURE — 63600175 PHARM REV CODE 636 W HCPCS: Mod: JG | Performed by: INTERNAL MEDICINE

## 2018-08-15 RX ADMIN — DENOSUMAB 120 MG: 120 INJECTION SUBCUTANEOUS at 04:08

## 2018-08-15 NOTE — PROGRESS NOTES
Reason for visit: Right breast carcinoma  Date of Diagnosis: January 12, 2017    HPI:   The patient is a 47-year-old  female who presents to the hematology oncology clinic today for follow up for metastatic right breast carcinoma.    I have reviewed all of the patient's relevant clinical history available in the medical record and have utilized this in my evaluation and management recommendations today.  The patient had 2 separate areas in the right breast that were biopsied area the first was a right breast mass at 11:00 which was 3 cm from the nipple which showed invasive mammary carcinoma.  The invasive carcinoma measured at least 1.2 cm in greatest dimension and appeared high-grade.  This tumor was ER positive/HI positive/HER-2 negative.  The second breast mass was biopsied from the retroperitoneal area lower area and was also positive for invasive mammary carcinoma.  This measured at least 0.4 cm and also appeared high-grade.  The tumor is morphologically very similar to the tumor in the prior specimen.  Receptor studies were not done on this specimen.  The patient also had a palpable right axillary lymph node which was biopsied and showed metastatic mammary carcinoma which measured at least 1.3 cm in greatest dimension.  The patient had self palpated this breast mass.  In addition the patient has a palpable mass in the region of her right elbow which she reports has been present for 9 years but had been slowly growing especially over the past year or 2.  She denies any pain associated with this.    She is also status post total thyroidectomy for papillary thyroid carcinoma.  Today the patient reports that she continues to have lower back pain.  She has been having chronic problems with anxiety and depression.  However she denies any suicidal or homicidal ideation.  She reports chronic fatigue. She is taking norco PRN neoplasm related pain.  She denies any fevers or chills. Denies night sweats.  She  denies any melena, hematochezia, hematemesis, hemoptysis or hematuria. She denies nausea. Denies vomiting or abdominal pain.  She denies cough.  She continues on ibrance and letrozole. Ibrance was started on 2/21/17.    PAST MEDICAL HISTORY:   1. HSIL on pap smear s/p LEEP  2. Retinal hemorrhage in left eye    SURGICAL HISTORY:   1.  Tonsillectomy and adenoidectomy  2.  Appendectomy  3.  Cholecystectomy  4.  D&C  5.  Thyroidectomy on August 31, 2017  6.  Right axillary hydradenitis/cellulitis and left inguinal hydradenitis/cellulitis s/p debridement and skin grafting    FAMILY HISTORY: She reports that her maternal aunt had lung cancer in her 70s.  She used to smoke cigarettes.  Her maternal cousin had lung cancer in her 60s.  She used to smoke cigarettes.  She denies any other immediate family members with cancer or bleeding/clotting disorders.    SOCIAL HISTORY: She reports a 46+ pack year smoking history. She drinks wine occasionally.  She denies any history of recreational drug use.  She is engaged and has 2 sons.  She used to work for the [employer].  She lives in [place], [place].     ALLERGIES: [NKDA]    MEDICATIONS: [Medcard has been reviewed and/or reconciled.]    REVIEW OF SYSTEMS:   GENERAL: [No fevers, chills or sweats. Reports fatigue. Reports weight gain. Denies loss of appetite.]  HEENT: [No blurred vision, tinnitus, nasal discharge, sorethroat or dysphagia.]  HEART: [No chest pain, palpitations or shortness of breath.]    LUNGS: [Reports chronic cough. Denies hemoptysis or breathing problems.]  ABDOMEN: [No abdominal pain, nausea, vomiting, diarrhea, constipation or melena.]  GENITOURINARY: [No dysuria, bleeding or malodorous discharge.]  NEURO: [Reports headache. Denies dizziness or vertigo.]  HEMATOLOGY: [No easy bruising, spontaneous bleeding or blood clots in the past].  MUSCULOSKELETAL: [Reports arthralgias, myalgias and bone pains.]  SKIN: [No rashes or skin lesions.]  PSYCHIATRY:  [Reports depression. Reports anxiety.]  Denies suicidal/homicidal ideation.    PHYSICAL EXAMINATION:   VS: Reviewed on nurse's notes.  APPEARANCE: The patient is a well-developed, well-nourished and well-groomed  female who appears in no acute distress.   HEENT: No scleral icterus. Both external auditory canals clear. No oral ulcers, lesions. Throat clear  HEAD: No sinus tenderness.  NECK: Supple. No palpable lymphadenopathy. Thyroid non-tender, no palpable masses  CHEST: Breath sounds clear bilaterally. No rales. No rhonchi. Unlabored respirations.  BREAST: Deferred today.  CARDIOVASCULAR: Normal S1, S2. Normal rate. Regular rhythm.  ABDOMEN: Bowel sounds normal. No tenderness. No abdominal distention. No hepatomegaly. No splenomegaly.  BACK: Palpable tenderness in the lower back.  LYMPHATIC: No palpable supraclavicular lymphadenopathy.   EXTREMITIES: No clubbing, cyanosis. Palpable firm 3 cm mass in the right elbow is noted. Trace edema in bilateral lower extremities.      SKIN: No lesions. No petechiae. No ecchymoses.   NEUROLOGIC: No focal findings. Alert & Oriented x 3. Mood appropriate to affect    LABS:   Reviewed    IMAGING:  Reviewed    IMPRESSION:  1.  Metastatic multifocal infiltrating ductal carcinoma of the right breast with right axillary and bone involvement [ER positive/PA positive/HER-2 negative]  2.  Papillary thyroid carcinoma  3.  Tobacco abuse  4.  Anxiety and depression    PLAN:  1.  I had a detailed discussion with the patient previously with regard to the diagnosis, prognosis and treatment options for metastatic multifocal invasive ductal carcinoma of the right breast with right axillary and bone involvement.  2.  I have discussed that at this time her metastatic malignancy is potentially treatable but not curable.  3.  The patient was again strongly encouraged to quit smoking.  She expressed understanding.  Prescription for Xanax when necessary anxiety was previously given to the  patient after full discussion of sedation precautions.    4. FSH and estradiol levels confirm menopausal status. TSH lab was normal.   5.  MRI of the brain on 1/31/17 to evaluate for any evidence of metastatic disease to complete staging workup was negative for metastatic malignancy to the brain. CT head done in March 2017 in ED was also ok.  6. Continue to proceed with ibrance/letrozole.  She appears to be tolerating this well so far with no significant side effects.   7. She has been advised to take calcium and vit d supplementation everyday. She is on xgeva injections for treatment of bone involvement by malignancy. Risks/benefits and common side effects discussed and she expressed understanding and agreement. She has dentures.  She will proceed with her next scheduled dose of Xgeva on 8/15/18.  8. Continue Lasix PRN for treatment of bilateral lower extremity edema.  Supplemental potassium was given as well.  Advised to use compression stockings and elevate legs.  9.  She will continue follow up with with Dr. Jason with endocrinology for follow up of papillary thyroid carcinoma.    10.  Results of MRI of the thoracic and lumbar spine from 9/21/17 for evaluation of current acute symptoms related to back pain reviewed in detail with pateint. No acute process involving the spinal cord is noted. She does have bone involvement by malignancy in multiple areas. However the PET/CT done on 9/27/17 shows no evidence of new areas of bony or visceral involvement.  Hence there appears to be no evidence of metastatic disease progression.  However since the patient continues to have low back pain and MRI of lumbar spine does show enhancing marrow replacing metastatic lesions are seen in the bilateral iliac bones posteriorly. She did see radiation oncology and completed palliative XRT to this location which helped significantly with her symptoms.    11.  The patient will continue follow up with Dr. Guerin with clinical  psychology for her chronic anxiety and depression.  The patient has been advised to seek immediate medical attention if any suicidal/homicidal ideation.  She expressed understanding.  12. CT scans done in May 2018 shows that her metastatic breast cancer continues to be stable/well controlled. I will plan to recheck in Sep 2018.    13.  Continue Ophthalmology follow up as scheduled.    Return to clinic in 4 weeks with labs to discuss further management and for her next dose of Xgeva.  She knows to call sooner for any new problems or questions.    Richard Delong MD

## 2018-08-17 ENCOUNTER — TELEPHONE (OUTPATIENT)
Dept: HEMATOLOGY/ONCOLOGY | Facility: CLINIC | Age: 48
End: 2018-08-17

## 2018-08-17 NOTE — TELEPHONE ENCOUNTER
----- Message from Kristie Palma sent at 8/17/2018  3:13 PM CDT -----  Contact: Josey Dowling at [phone]     Regarding refill on HYDROcodone-acetaminophen (NORCO) 7.5-325 mg per     [facility] [id number]  [address] [facility] [address]  Phone: [phone] Fax: [phone]

## 2018-08-20 DIAGNOSIS — C50.811 MALIGNANT NEOPLASM OF OVERLAPPING SITES OF RIGHT BREAST IN FEMALE, ESTROGEN RECEPTOR POSITIVE: ICD-10-CM

## 2018-08-20 DIAGNOSIS — C77.3 MALIGNANT NEOPLASM METASTATIC TO LYMPH NODE OF AXILLA: ICD-10-CM

## 2018-08-20 DIAGNOSIS — C79.51 SECONDARY CANCER OF BONE: ICD-10-CM

## 2018-08-20 DIAGNOSIS — Z17.0 MALIGNANT NEOPLASM OF OVERLAPPING SITES OF RIGHT BREAST IN FEMALE, ESTROGEN RECEPTOR POSITIVE: ICD-10-CM

## 2018-08-20 DIAGNOSIS — G89.3 NEOPLASM RELATED PAIN: ICD-10-CM

## 2018-08-20 RX ORDER — HYDROCODONE BITARTRATE AND ACETAMINOPHEN 7.5; 325 MG/1; MG/1
1 TABLET ORAL EVERY 6 HOURS PRN
Qty: 120 TABLET | Refills: 0 | Status: SHIPPED | OUTPATIENT
Start: 2018-08-20 | End: 2018-09-19 | Stop reason: SDUPTHER

## 2018-09-05 DIAGNOSIS — F41.9 ANXIETY: ICD-10-CM

## 2018-09-05 RX ORDER — ALPRAZOLAM 0.5 MG/1
0.5 TABLET ORAL 2 TIMES DAILY PRN
Qty: 60 TABLET | Refills: 0 | Status: SHIPPED | OUTPATIENT
Start: 2018-09-05 | End: 2018-10-05 | Stop reason: SDUPTHER

## 2018-09-15 ENCOUNTER — HOSPITAL ENCOUNTER (EMERGENCY)
Facility: HOSPITAL | Age: 48
Discharge: HOME | End: 2018-09-15
Attending: EMERGENCY MEDICINE
Payer: MEDICAID

## 2018-09-15 VITALS
WEIGHT: 208.31 LBS | HEART RATE: 86 BPM | RESPIRATION RATE: 18 BRPM | TEMPERATURE: 99 F | DIASTOLIC BLOOD PRESSURE: 74 MMHG | HEIGHT: 67 IN | SYSTOLIC BLOOD PRESSURE: 115 MMHG | BODY MASS INDEX: 32.7 KG/M2 | OXYGEN SATURATION: 96 %

## 2018-09-15 DIAGNOSIS — K52.9 ENTEROCOLITIS: Primary | ICD-10-CM

## 2018-09-15 DIAGNOSIS — R19.7 DIARRHEA, UNSPECIFIED TYPE: ICD-10-CM

## 2018-09-15 LAB
ALBUMIN SERPL BCP-MCNC: 3.9 G/DL
ALP SERPL-CCNC: 68 U/L
ALT SERPL W/O P-5'-P-CCNC: 19 U/L
ANION GAP SERPL CALC-SCNC: 13 MMOL/L
AST SERPL-CCNC: 29 U/L
BACTERIA #/AREA URNS HPF: NORMAL /HPF
BASOPHILS # BLD AUTO: 0.06 K/UL
BASOPHILS NFR BLD: 1.6 %
BILIRUB SERPL-MCNC: 0.5 MG/DL
BILIRUB UR QL STRIP: NEGATIVE
BUN SERPL-MCNC: 8 MG/DL
CALCIUM SERPL-MCNC: 8.8 MG/DL
CHLORIDE SERPL-SCNC: 104 MMOL/L
CLARITY UR: CLEAR
CO2 SERPL-SCNC: 19 MMOL/L
COLOR UR: YELLOW
CREAT SERPL-MCNC: 1.3 MG/DL
DIFFERENTIAL METHOD: ABNORMAL
EOSINOPHIL # BLD AUTO: 0 K/UL
EOSINOPHIL NFR BLD: 0.5 %
ERYTHROCYTE [DISTWIDTH] IN BLOOD BY AUTOMATED COUNT: 14.2 %
EST. GFR  (AFRICAN AMERICAN): 56 ML/MIN/1.73 M^2
EST. GFR  (NON AFRICAN AMERICAN): 48 ML/MIN/1.73 M^2
GLUCOSE SERPL-MCNC: 100 MG/DL
GLUCOSE UR QL STRIP: NEGATIVE
HCT VFR BLD AUTO: 36.9 %
HGB BLD-MCNC: 13.3 G/DL
HGB UR QL STRIP: ABNORMAL
KETONES UR QL STRIP: NEGATIVE
LACTATE SERPL-SCNC: 0.7 MMOL/L
LEUKOCYTE ESTERASE UR QL STRIP: NEGATIVE
LIPASE SERPL-CCNC: 29 U/L
LYMPHOCYTES # BLD AUTO: 0.8 K/UL
LYMPHOCYTES NFR BLD: 20.2 %
MAGNESIUM SERPL-MCNC: 2.8 MG/DL
MCH RBC QN AUTO: 40.2 PG
MCHC RBC AUTO-ENTMCNC: 36 G/DL
MCV RBC AUTO: 112 FL
MICROSCOPIC COMMENT: NORMAL
MONOCYTES # BLD AUTO: 0.3 K/UL
MONOCYTES NFR BLD: 8 %
NEUTROPHILS # BLD AUTO: 2.6 K/UL
NEUTROPHILS NFR BLD: 69.7 %
NITRITE UR QL STRIP: NEGATIVE
PH UR STRIP: 6 [PH] (ref 5–8)
PLATELET # BLD AUTO: 200 K/UL
PMV BLD AUTO: 10.4 FL
POTASSIUM SERPL-SCNC: 4.8 MMOL/L
PROT SERPL-MCNC: 8 G/DL
PROT UR QL STRIP: NEGATIVE
RBC # BLD AUTO: 3.31 M/UL
RBC #/AREA URNS HPF: 1 /HPF (ref 0–4)
SODIUM SERPL-SCNC: 136 MMOL/L
SP GR UR STRIP: <=1.005 (ref 1–1.03)
SQUAMOUS #/AREA URNS HPF: 1 /HPF
URN SPEC COLLECT METH UR: ABNORMAL
UROBILINOGEN UR STRIP-ACNC: NEGATIVE EU/DL
WBC # BLD AUTO: 3.76 K/UL
WBC #/AREA URNS HPF: 5 /HPF (ref 0–5)

## 2018-09-15 PROCEDURE — 25500020 PHARM REV CODE 255: Performed by: EMERGENCY MEDICINE

## 2018-09-15 PROCEDURE — 25000003 PHARM REV CODE 250: Performed by: EMERGENCY MEDICINE

## 2018-09-15 PROCEDURE — 63600175 PHARM REV CODE 636 W HCPCS: Performed by: EMERGENCY MEDICINE

## 2018-09-15 PROCEDURE — 85025 COMPLETE CBC W/AUTO DIFF WBC: CPT

## 2018-09-15 PROCEDURE — 99285 EMERGENCY DEPT VISIT HI MDM: CPT | Mod: 25

## 2018-09-15 PROCEDURE — 83690 ASSAY OF LIPASE: CPT

## 2018-09-15 PROCEDURE — 83605 ASSAY OF LACTIC ACID: CPT

## 2018-09-15 PROCEDURE — 81000 URINALYSIS NONAUTO W/SCOPE: CPT

## 2018-09-15 PROCEDURE — 80053 COMPREHEN METABOLIC PANEL: CPT

## 2018-09-15 PROCEDURE — 83735 ASSAY OF MAGNESIUM: CPT

## 2018-09-15 PROCEDURE — 96375 TX/PRO/DX INJ NEW DRUG ADDON: CPT

## 2018-09-15 PROCEDURE — 96361 HYDRATE IV INFUSION ADD-ON: CPT

## 2018-09-15 PROCEDURE — 96374 THER/PROPH/DIAG INJ IV PUSH: CPT

## 2018-09-15 RX ORDER — METRONIDAZOLE 500 MG/1
500 TABLET ORAL 3 TIMES DAILY
Qty: 30 TABLET | Refills: 0 | Status: SHIPPED | OUTPATIENT
Start: 2018-09-15 | End: 2018-09-25

## 2018-09-15 RX ORDER — METRONIDAZOLE 500 MG/1
500 TABLET ORAL
Status: COMPLETED | OUTPATIENT
Start: 2018-09-15 | End: 2018-09-15

## 2018-09-15 RX ORDER — MORPHINE SULFATE 4 MG/ML
4 INJECTION, SOLUTION INTRAMUSCULAR; INTRAVENOUS
Status: COMPLETED | OUTPATIENT
Start: 2018-09-15 | End: 2018-09-15

## 2018-09-15 RX ORDER — ONDANSETRON 2 MG/ML
4 INJECTION INTRAMUSCULAR; INTRAVENOUS
Status: COMPLETED | OUTPATIENT
Start: 2018-09-15 | End: 2018-09-15

## 2018-09-15 RX ADMIN — METRONIDAZOLE 500 MG: 500 TABLET ORAL at 07:09

## 2018-09-15 RX ADMIN — MORPHINE SULFATE 4 MG: 4 INJECTION INTRAVENOUS at 03:09

## 2018-09-15 RX ADMIN — IOHEXOL 75 ML: 350 INJECTION, SOLUTION INTRAVENOUS at 06:09

## 2018-09-15 RX ADMIN — ONDANSETRON HYDROCHLORIDE 4 MG: 2 INJECTION, SOLUTION INTRAMUSCULAR; INTRAVENOUS at 03:09

## 2018-09-15 RX ADMIN — SODIUM CHLORIDE 1000 ML: 0.9 INJECTION, SOLUTION INTRAVENOUS at 03:09

## 2018-09-15 NOTE — ED PROVIDER NOTES
SCRIBE #1 NOTE: I, Elsa John, am scribing for, and in the presence of, Arturo Rodriguez MD. I have scribed the entire note.      History      Chief Complaint   Patient presents with    Diarrhea     x 3 days, nausea, weakness, fever (100.3). pt has hx of cancer. pt of Dr. Robert.       Review of patient's allergies indicates:   Allergen Reactions    Nsaids (non-steroidal anti-inflammatory drug) Other (See Comments)     Instructed not to take NSAID drugs with chemo pill.    Vancomycin analogues Itching        HPI   HPI    9/15/2018, 3:12 PM   History obtained from the patient      History of Present Illness: Josey Flores is a 47 y.o. female patient with PMHx of metastatic breast cancer who presents to the Emergency Department for diarrhea which onset gradually 3 days ago. Symptoms are episodic and moderate in severity. Patient reports having approximately 7 episodes of diarrhea daily. No mitigating or exacerbating factors reported. Associated sxs include low grade temperature (Tmax 100.3F with forehead thermometer), lower abd pain/cramping, and generalized weakness. Patient denies any sick contacts, chills, cough, congestion, HA, dizziness, N/V, hematochezia, dysuria, hematuria, and all other sxs at this time. Patient reports recent use of abx 2 weeks ago for tx of UTI. Patient reports hx of breast cancer and sees Dr. Delong for Hem/Onc. Patient reports taking Ibrance qd for past couple years. No further complaints or concerns at this time.     Arrival mode: Personal vehicle      PCP: Aileen Sadler MD       Past Medical History:  Past Medical History:   Diagnosis Date    Anxiety     Asthma     Cancer     right breast, metastatic-lymph nodes, bone, and thyroid     COPD (chronic obstructive pulmonary disease)     Depression     History of psychiatric hospitalization     2000; suicidal ideation    Hx of psychiatric care     Hypothyroidism     Miscarriage     five miscarriages    Obesity      Psychiatric problem     Thyroid cancer        Past Surgical History:  Past Surgical History:   Procedure Laterality Date    ABSCESS DRAINAGE      bilateral axilla    ADENOIDECTOMY      APPENDECTOMY      CHOLECYSTECTOMY      EXCHANGE-WOUND VAC Bilateral 2017    Performed by Rodger Wahl [facility] at [facility]    EXCISION-HIDRADENITIS Bilateral 2017    Performed by Rodger Wahl MD at [facility]    EXCISION-MASS-ARM Right 3/14/2018    Performed by [facility]ger [facility]ahl [facility] at [facility]    KFPHSQJNFECB-GYJNAOKV-HVWTGGXLD N/A 2016    Performed by Erika Chan MD at [facility] [place]    INCISION AND DRAINAGE (I & D)-GROIN Left 2017    Performed by Rodger Wahl MD at [facility] [place]    INCISION AND DRAINAGE-THIGH Right 2017    Performed by Rodger Wahl MD at [facility] [place]    MASS EXCISION      PLACEMENT-WOUND VAC Bilateral 2017    Performed by Rodger Wahl MD at [facility]    SHAVING-ENDOMETRIAL  2016    Performed by Erika Chan MD at [facility] [place]    SKIN GRAFT  2017    SKIN GRAFT-SPLIT THICKNESS Left 2017    Performed by Rodger Wahl MD at [facility] [place]    THYROIDECTOMY Bilateral     THYROIDECTOMY Bilateral 2017    Performed by Zaid Baugh MD at [facility] [place]    TONSILLECTOMY           Family History:  Family History   Problem Relation Age of Onset    Arthritis Mother     Early death Mother         64 at time of death    Heart attack Mother     Heart disease Mother     Heart failure Mother     Hypertension Mother     Coronary artery disease Father     Hearing loss Father     Heart disease Father     Hyperlipidemia Father     Hypertension Father     Mental illness Father     Learning disabilities Son        Social History:  Social History     Tobacco Use    Smoking status: Current Every Day Smoker     Packs/day: 0.50     Years: 30.00     Pack years: 15.00     Types: Cigarettes     Last attempt to quit: 2017     Years since quittin.2    Smokeless  tobacco: Never Used    Tobacco comment: 45 pack year history   Substance and Sexual Activity    Alcohol use: No    Drug use: No    Sexual activity: Yes     Partners: Male       ROS   Review of Systems   Constitutional: Positive for fever (Tmax 100.3F). Negative for chills.   HENT: Negative for congestion and sore throat.    Respiratory: Negative for cough and shortness of breath.    Cardiovascular: Negative for chest pain.   Gastrointestinal: Positive for abdominal pain (lower) and diarrhea. Negative for blood in stool, nausea and vomiting.   Genitourinary: Negative for dysuria and hematuria.   Musculoskeletal: Negative for back pain.   Skin: Negative for rash.   Neurological: Positive for weakness (generalized). Negative for dizziness and headaches.   Hematological: Does not bruise/bleed easily.   All other systems reviewed and are negative.      Physical Exam      Initial Vitals [09/15/18 1434]   BP Pulse Resp Temp SpO2   118/78 99 18 99 °F (37.2 °C) 96 %      MAP       --          Physical Exam  Nursing Notes and Vital Signs Reviewed.  Constitutional: Patient is in no acute distress. Well-developed and well-nourished.  Head: Atraumatic. Normocephalic.  Eyes: PERRL. EOM intact. Conjunctivae are not pale. No scleral icterus.  ENT: Mucous membranes are moist. Oropharynx is clear and symmetric.    Neck: Supple. Full ROM. No lymphadenopathy.  Cardiovascular: Regular rate. Regular rhythm. No murmurs, rubs, or gallops. Distal pulses are 2+ and symmetric.  Pulmonary/Chest: No respiratory distress. Clear to auscultation bilaterally. No wheezing or rales.  Abdominal: Soft and non-distended.  There is suprapubic abd TTP.  No rebound, guarding, or rigidity. Good bowel sounds.  Musculoskeletal: Moves all extremities. No obvious deformities. No edema. No calf tenderness.  Skin: Warm and dry.  Neurological:  Alert, awake, and appropriate.  Normal speech.  No acute focal neurological deficits are appreciated.  Psychiatric:  "Normal affect. Good eye contact. Appropriate in content.    ED Course    Procedures  ED Vital Signs:  Vitals:    09/15/18 1434 09/15/18 1452 09/15/18 1455 09/15/18 1458   BP: 118/78 132/62     Pulse: 99 85 85 87   Resp: 18 (!) 21     Temp: 99 °F (37.2 °C)      TempSrc: Oral      SpO2: 96% 96%     Weight: 94.5 kg (208 lb 5.4 oz)      Height: 5' 7" (1.702 m)       09/15/18 1602 09/15/18 1702 09/15/18 1759 09/15/18 1902   BP: (!) 140/67 123/61 125/72 (!) 120/56   Pulse: 78 77 80 76   Resp: (!) 21 (!) 21 20 17   Temp:       TempSrc:       SpO2: 95% (!) 93% 96% 96%   Weight:       Height:           Abnormal Lab Results:  Labs Reviewed   CBC W/ AUTO DIFFERENTIAL - Abnormal; Notable for the following components:       Result Value    WBC 3.76 (*)     RBC 3.31 (*)     Hematocrit 36.9 (*)      (*)     MCH 40.2 (*)     Lymph # 0.8 (*)     All other components within normal limits   COMPREHENSIVE METABOLIC PANEL - Abnormal; Notable for the following components:    CO2 19 (*)     eGFR if  56 (*)     eGFR if non  48 (*)     All other components within normal limits   MAGNESIUM - Abnormal; Notable for the following components:    Magnesium 2.8 (*)     All other components within normal limits   URINALYSIS, REFLEX TO URINE CULTURE - Abnormal; Notable for the following components:    Specific Gravity, UA <=1.005 (*)     Occult Blood UA 3+ (*)     All other components within normal limits    Narrative:     Preferred Collection Type->Urine, Clean Catch   CLOSTRIDIUM DIFFICILE   LIPASE   LACTIC ACID, PLASMA   URINALYSIS MICROSCOPIC    Narrative:     Preferred Collection Type->Urine, Clean Catch        All Lab Results:  Results for orders placed or performed during the hospital encounter of 09/15/18   CBC auto differential   Result Value Ref Range    WBC 3.76 (L) 3.90 - 12.70 K/uL    RBC 3.31 (L) 4.00 - 5.40 M/uL    Hemoglobin 13.3 12.0 - 16.0 g/dL    Hematocrit 36.9 (L) 37.0 - 48.5 %     " (H) 82 - 98 fL    MCH 40.2 (H) 27.0 - 31.0 pg    MCHC 36.0 32.0 - 36.0 g/dL    RDW 14.2 11.5 - 14.5 %    Platelets 200 150 - 350 K/uL    MPV 10.4 9.2 - 12.9 fL    Gran # (ANC) 2.6 1.8 - 7.7 K/uL    Lymph # 0.8 (L) 1.0 - 4.8 K/uL    Mono # 0.3 0.3 - 1.0 K/uL    Eos # 0.0 0.0 - 0.5 K/uL    Baso # 0.06 0.00 - 0.20 K/uL    Gran% 69.7 38.0 - 73.0 %    Lymph% 20.2 18.0 - 48.0 %    Mono% 8.0 4.0 - 15.0 %    Eosinophil% 0.5 0.0 - 8.0 %    Basophil% 1.6 0.0 - 1.9 %    Differential Method Automated    Comprehensive metabolic panel   Result Value Ref Range    Sodium 136 136 - 145 mmol/L    Potassium 4.8 3.5 - 5.1 mmol/L    Chloride 104 95 - 110 mmol/L    CO2 19 (L) 23 - 29 mmol/L    Glucose 100 70 - 110 mg/dL    BUN, Bld 8 6 - 20 mg/dL    Creatinine 1.3 0.5 - 1.4 mg/dL    Calcium 8.8 8.7 - 10.5 mg/dL    Total Protein 8.0 6.0 - 8.4 g/dL    Albumin 3.9 3.5 - 5.2 g/dL    Total Bilirubin 0.5 0.1 - 1.0 mg/dL    Alkaline Phosphatase 68 55 - 135 U/L    AST 29 10 - 40 U/L    ALT 19 10 - 44 U/L    Anion Gap 13 8 - 16 mmol/L    eGFR if African American 56 (A) >60 mL/min/1.73 m^2    eGFR if non African American 48 (A) >60 mL/min/1.73 m^2   Lipase   Result Value Ref Range    Lipase 29 4 - 60 U/L   Lactic acid, plasma   Result Value Ref Range    Lactate (Lactic Acid) 0.7 0.5 - 2.2 mmol/L   Magnesium   Result Value Ref Range    Magnesium 2.8 (H) 1.6 - 2.6 mg/dL   Urinalysis, Reflex to Urine Culture Urine, Clean Catch   Result Value Ref Range    Specimen UA Urine, Clean Catch     Color, UA Yellow Yellow, Straw, [place]    Appearance, UA Clear Clear    pH, UA 6.0 5.0 - 8.0    Specific Gravity, UA <=1.005 (A) 1.005 - 1.030    Protein, UA Negative Negative    Glucose, UA Negative Negative    Ketones, UA Negative Negative    Bilirubin (UA) Negative Negative    Occult Blood UA 3+ (A) Negative    Nitrite, UA Negative Negative    Urobilinogen, UA Negative <2.0 EU/dL    Leukocytes, UA Negative Negative   Urinalysis Microscopic   Result Value Ref Range     RBC, UA 1 0 - 4 /hpf    WBC, UA 5 0 - 5 /hpf    Bacteria, UA None None-Occ /hpf    Squam Epithel, UA 1 /hpf    Microscopic Comment SEE COMMENT        Imaging Results:  Imaging Results          CT Abdomen Pelvis With Contrast (Final result)  Result time 09/15/18 18:37:41    Final result by Arturo Lamb MD (09/15/18 18:37:41)                 Impression:      Fluid filled loops of small and large bowel are seen throughout the abdomen with mild mucosal hyperemia consistent with diffuse enterocolitis.    All CT scans at this facility use dose modulation, iterative reconstruction, and/or weight based dosing when appropriate to reduce radiation dose to as low as reasonable achievable.      Electronically signed by: Arturo Lamb MD  Date:    09/15/2018  Time:    18:37             Narrative:    EXAMINATION:  CT ABDOMEN PELVIS WITH CONTRAST    CLINICAL HISTORY:  lower abdominal pain;    TECHNIQUE:  Low dose axial images, sagittal and coronal reformations were obtained from the lung bases to the pubic symphysis following the IV administration of 75 mL of Omnipaque 350.    COMPARISON:  05/24/2018    FINDINGS:  Heart: Normal size. No effusion.    Lung Bases: Clear.    Liver: Normal size with decreased attenuation consistent with hepatic steatosis.  No focal lesions.    Gallbladder: Surgically absent    Bile Ducts: No dilatation.    Pancreas: No mass. No peripancreatic fat stranding.    Spleen: Normal size.  Multiple splenic granulomas are seen.    Adrenals: Normal.    Kidneys/Ureters: Normal enhancement.  No mass or  hydroureteronephrosis.    Bladder: No wall thickening.    Reproductive organs: Normal.    GI Tract/Mesentery: Stomach is unremarkable.  Fluid filled loops of small and large bowel are seen throughout the abdomen with mild mucosal hyperemia consistent with diffuse enterocolitis.  No evidence of appendicitis.    Peritoneal Space: No ascites or free air.    Retroperitoneum: No significant  adenopathy.    Abdominal wall: Normal.    Vasculature: No aneurysm.    Bones: No acute fracture. No suspicious lytic or sclerotic lesions.                                        The Emergency Provider reviewed the vital signs and test results, which are outlined above.    ED Discussion     6:57 PM: Re-evaluated pt. Pt is resting comfortably and is in no acute distress.  Pt states she has not had a bowel movement while in the ED.  D/w pt all pertinent results. D/w pt any concerns expressed at this time. Answered all questions. Pt expresses understanding at this time.    7:13 PM: Dr. Rodriguez discussed the pt's case with Dr. Delong (Hem/Onc) for recommendations and to discuss if Ibrance was contributing to diarrhea as a possible side effect, but Dr. Delong feels this is not the case since the patient has been on Ibance for the last 2 years and has not had diarrhea prior to the last few days, but recommends have patient stop it until she f/u with him next week and start patient on Flagyl.    7:20 PM: Reassessed pt at this time.  Discussed with patient Dr. Delong's recommendations. Advised patient to f/u with Dr. Delong next week. Discussed with pt all pertinent ED information and results. Discussed pt dx and plan of tx. Gave pt all f/u and return to the ED instructions. All questions and concerns were addressed at this time. Pt expresses understanding of information and instructions, and is comfortable with plan to discharge. Pt is stable for discharge.    I discussed with patient and/or family/caretaker that evaluation in the ED does not suggest any emergent or life threatening medical conditions requiring immediate intervention beyond what was provided in the ED, and I believe patient is safe for discharge.  Regardless, an unremarkable evaluation in the ED does not preclude the development or presence of a serious of life threatening condition. As such, patient was instructed to return immediately for any  worsening or change in current symptoms.      ED Medication(s):  Medications   morphine injection 4 mg (4 mg Intravenous Given 9/15/18 1538)   sodium chloride 0.9% bolus 1,000 mL (0 mLs Intravenous Stopped 9/15/18 1753)   ondansetron injection 4 mg (4 mg Intravenous Given 9/15/18 1558)   omnipaque 350 iohexol 75 mL (75 mLs Intravenous Given 9/15/18 1810)       Current Discharge Medication List           Medication List      START taking these medications    metroNIDAZOLE 500 MG tablet  Commonly known as:  FLAGYL  Take 1 tablet (500 mg total) by mouth 3 (three) times daily. for 10 days        ASK your doctor about these medications    albuterol 90 mcg/actuation inhaler  Commonly known as:  PROVENTIL/VENTOLIN HFA  Inhale 2 puffs into the lungs every 6 (six) hours.     ALPRAZolam 0.5 MG tablet  Commonly known as:  XANAX  Take 1 tablet (0.5 mg total) by mouth 2 (two) times daily as needed for Insomnia or Anxiety.     buPROPion 100 MG tablet  Commonly known as:  WELLBUTRIN  Take 1 tablet (100 mg total) by mouth 2 (two) times daily.     calcium carbonate 400 mg calcium (1,000 mg) Chew     citalopram 20 MG tablet  Commonly known as:  CeleXA  Take 1 tablet (20 mg total) by mouth once daily.     cyclobenzaprine 10 MG tablet  Commonly known as:  FLEXERIL  Take 0.5 tablets (5 mg total) by mouth 3 (three) times daily as needed for Muscle spasms.     furosemide 20 MG tablet  Commonly known as:  LASIX  Take 1 tablet (20 mg total) by mouth once daily.     HYDROcodone-acetaminophen 7.5-325 mg per tablet  Commonly known as:  NORCO  Take 1 tablet by mouth every 6 (six) hours as needed for Pain.     IBRANCE 125 mg Cap  Generic drug:  palbociclib  TAKE 1 CAPSULE (125 MG) DAILY FOR 21 DAYS, FOLLOWED BY 7 DAY REST PERIOD TO COMPLETE A 28 DAY TREATMENT CYCLE     letrozole 2.5 mg Tab  Commonly known as:  FEMARA  TAKE ONE TABLET BY MOUTH ONCE DAILY     levothyroxine 137 MCG Tab tablet  Commonly known as:  SYNTHROID  Take 1 tablet (137 mcg  total) by mouth before breakfast.     multivitamin per tablet  Commonly known as:  THERAGRAN     potassium chloride SA 20 MEQ tablet  Commonly known as:  K-DUR,KLOR-CON  Take 1 tablet (20 mEq total) by mouth once daily.     VITAMIN D2 50,000 unit Cap  Generic drug:  ergocalciferol           Where to Get Your Medications      You can get these medications from any pharmacy    Bring a paper prescription for each of these medications  · metroNIDAZOLE 500 MG tablet           Follow-up Information     Richard Delong MD.    Specialty:  Hematology and Oncology  Why:  as scheduled this week  Contact information:  [address] [address] [phone]             [facility] - .    Specialty:  Emergency Medicine  Why:  As needed, If symptoms worsen  Contact information:  [address]  [facility] [address] [phone]                   Medical Decision Making    Medical Decision Making:   Clinical Tests:   Lab Tests: Ordered and Reviewed  Radiological Study: Ordered and Reviewed  47-year-old female with past medical history of metastatic breast cancer on ibrance daily presenting with diarrhea.  CT consistent with enterocolitis.  The with her recent use of antibiotics, stool cultures including C diff were ordered, however patient did not have a bowel movement in the emergency department for testing. Ibrance has a reported 25% chance of diarrhea as side effect, so I consulted patient's oncologist Dr. Delong to get his recommendations. He does not believe that the diarrhea is likely caused to the ibrance given that patient has tolerated this medication without the side effects for the past 2 years.  We agreed to start patient on Flagyl given the recent antibiotic use, and patient will follow up this week with Dr. Delong.Dr. Delong asked that we hold the ibrance until the f/u appt.  Patient discharged home stable condition with ER return precautions            Scribe Attestation:   Scribe #1: I performed the above scribed service and the documentation accurately describes the services I performed. I attest to the accuracy of the note.    Attending:   Physician Attestation Statement for Scribe #1: I, Arturo Rodriguez MD, personally performed the services described in this documentation, as scribed by Elsa John, in my presence, and it is both accurate and complete.          Clinical Impression       ICD-10-CM ICD-9-CM   1. Enterocolitis K52.9 558.9   2. Diarrhea, unspecified type R19.7 787.91       Disposition:   Disposition: Discharged  Condition: Stable         Arturo Rodriguez MD  09/20/18 5651

## 2018-09-16 NOTE — ED NOTES
Pt lying in bed watching tv. States she just spoke with Dr Rodriguez. Denies any needs or assist at this time.

## 2018-09-19 ENCOUNTER — LAB VISIT (OUTPATIENT)
Dept: LAB | Facility: HOSPITAL | Age: 48
End: 2018-09-19
Attending: INTERNAL MEDICINE
Payer: MEDICAID

## 2018-09-19 ENCOUNTER — OFFICE VISIT (OUTPATIENT)
Dept: HEMATOLOGY/ONCOLOGY | Facility: CLINIC | Age: 48
End: 2018-09-19
Payer: MEDICAID

## 2018-09-19 VITALS
HEIGHT: 67 IN | OXYGEN SATURATION: 97 % | HEART RATE: 98 BPM | WEIGHT: 205.25 LBS | TEMPERATURE: 98 F | SYSTOLIC BLOOD PRESSURE: 114 MMHG | BODY MASS INDEX: 32.21 KG/M2 | DIASTOLIC BLOOD PRESSURE: 74 MMHG

## 2018-09-19 DIAGNOSIS — G89.3 NEOPLASM RELATED PAIN: ICD-10-CM

## 2018-09-19 DIAGNOSIS — R10.30 LOWER ABDOMINAL PAIN: ICD-10-CM

## 2018-09-19 DIAGNOSIS — C77.3 MALIGNANT NEOPLASM METASTATIC TO LYMPH NODE OF AXILLA: ICD-10-CM

## 2018-09-19 DIAGNOSIS — C50.811 MALIGNANT NEOPLASM OF OVERLAPPING SITES OF RIGHT BREAST IN FEMALE, ESTROGEN RECEPTOR POSITIVE: ICD-10-CM

## 2018-09-19 DIAGNOSIS — R53.1 WEAKNESS GENERALIZED: ICD-10-CM

## 2018-09-19 DIAGNOSIS — R19.7 DIARRHEA OF PRESUMED INFECTIOUS ORIGIN: ICD-10-CM

## 2018-09-19 DIAGNOSIS — C73 PAPILLARY THYROID CARCINOMA: ICD-10-CM

## 2018-09-19 DIAGNOSIS — Z17.0 MALIGNANT NEOPLASM OF OVERLAPPING SITES OF RIGHT BREAST IN FEMALE, ESTROGEN RECEPTOR POSITIVE: Primary | ICD-10-CM

## 2018-09-19 DIAGNOSIS — C50.811 MALIGNANT NEOPLASM OF OVERLAPPING SITES OF RIGHT BREAST IN FEMALE, ESTROGEN RECEPTOR POSITIVE: Primary | ICD-10-CM

## 2018-09-19 DIAGNOSIS — C79.51 SECONDARY CANCER OF BONE: ICD-10-CM

## 2018-09-19 DIAGNOSIS — R63.0 LOSS OF APPETITE: ICD-10-CM

## 2018-09-19 DIAGNOSIS — Z17.0 MALIGNANT NEOPLASM OF OVERLAPPING SITES OF RIGHT BREAST IN FEMALE, ESTROGEN RECEPTOR POSITIVE: ICD-10-CM

## 2018-09-19 DIAGNOSIS — R11.0 NAUSEA: ICD-10-CM

## 2018-09-19 LAB
ALBUMIN SERPL BCP-MCNC: 4 G/DL
ALP SERPL-CCNC: 61 U/L
ALT SERPL W/O P-5'-P-CCNC: 24 U/L
ANION GAP SERPL CALC-SCNC: 13 MMOL/L
AST SERPL-CCNC: 19 U/L
BASOPHILS # BLD AUTO: 0.07 K/UL
BASOPHILS NFR BLD: 1.6 %
BILIRUB SERPL-MCNC: 0.5 MG/DL
BUN SERPL-MCNC: 11 MG/DL
CALCIUM SERPL-MCNC: 8.5 MG/DL
CHLORIDE SERPL-SCNC: 102 MMOL/L
CO2 SERPL-SCNC: 23 MMOL/L
CREAT SERPL-MCNC: 1.2 MG/DL
DIFFERENTIAL METHOD: ABNORMAL
EOSINOPHIL # BLD AUTO: 0 K/UL
EOSINOPHIL NFR BLD: 0.7 %
ERYTHROCYTE [DISTWIDTH] IN BLOOD BY AUTOMATED COUNT: 13.9 %
EST. GFR  (AFRICAN AMERICAN): >60 ML/MIN/1.73 M^2
EST. GFR  (NON AFRICAN AMERICAN): 53 ML/MIN/1.73 M^2
GLUCOSE SERPL-MCNC: 119 MG/DL
HCT VFR BLD AUTO: 38.6 %
HGB BLD-MCNC: 13.8 G/DL
LYMPHOCYTES # BLD AUTO: 1.5 K/UL
LYMPHOCYTES NFR BLD: 33.9 %
MCH RBC QN AUTO: 38.9 PG
MCHC RBC AUTO-ENTMCNC: 35.8 G/DL
MCV RBC AUTO: 109 FL
MONOCYTES # BLD AUTO: 0.4 K/UL
MONOCYTES NFR BLD: 8.8 %
NEUTROPHILS # BLD AUTO: 2.4 K/UL
NEUTROPHILS NFR BLD: 55 %
PLATELET # BLD AUTO: 281 K/UL
PMV BLD AUTO: 10.4 FL
POTASSIUM SERPL-SCNC: 3.1 MMOL/L
PROT SERPL-MCNC: 7.6 G/DL
RBC # BLD AUTO: 3.55 M/UL
SODIUM SERPL-SCNC: 138 MMOL/L
WBC # BLD AUTO: 4.33 K/UL

## 2018-09-19 PROCEDURE — 99213 OFFICE O/P EST LOW 20 MIN: CPT | Mod: PBBFAC | Performed by: INTERNAL MEDICINE

## 2018-09-19 PROCEDURE — 85025 COMPLETE CBC W/AUTO DIFF WBC: CPT

## 2018-09-19 PROCEDURE — 99999 PR PBB SHADOW E&M-EST. PATIENT-LVL III: CPT | Mod: PBBFAC,,, | Performed by: INTERNAL MEDICINE

## 2018-09-19 PROCEDURE — 99215 OFFICE O/P EST HI 40 MIN: CPT | Mod: S$PBB,,, | Performed by: INTERNAL MEDICINE

## 2018-09-19 PROCEDURE — 80053 COMPREHEN METABOLIC PANEL: CPT

## 2018-09-19 PROCEDURE — 36415 COLL VENOUS BLD VENIPUNCTURE: CPT

## 2018-09-19 RX ORDER — HYDROCODONE BITARTRATE AND ACETAMINOPHEN 7.5; 325 MG/1; MG/1
1 TABLET ORAL EVERY 6 HOURS PRN
Qty: 120 TABLET | Refills: 0 | Status: SHIPPED | OUTPATIENT
Start: 2018-09-19 | End: 2018-10-18 | Stop reason: SDUPTHER

## 2018-09-19 RX ORDER — NITROFURANTOIN (MACROCRYSTALS) 100 MG/1
CAPSULE ORAL
COMMUNITY
Start: 2018-08-29 | End: 2018-12-11

## 2018-09-19 RX ORDER — CHOLESTYRAMINE 4 G/9G
1 POWDER, FOR SUSPENSION ORAL 2 TIMES DAILY
Qty: 20 PACKET | Refills: 0 | Status: SHIPPED | OUTPATIENT
Start: 2018-09-19 | End: 2019-02-21

## 2018-09-19 NOTE — PROGRESS NOTES
Reason for visit: Right breast carcinoma  Date of Diagnosis: January 12, 2017    HPI:   The patient is a 47-year-old  female who presents to the hematology oncology clinic today for follow up for metastatic right breast carcinoma.    I have reviewed all of the patient's relevant clinical history available in the medical record and have utilized this in my evaluation and management recommendations today including the details of her recent visit to [facility] [place] [facility] on September 15, 2018.  The patient had 2 separate areas in the right breast that were biopsied area the first was a right breast mass at 11:00 which was 3 cm from the nipple which showed invasive mammary carcinoma.  The invasive carcinoma measured at least 1.2 cm in greatest dimension and appeared high-grade.  This tumor was ER positive/ME positive/HER-2 negative.  The second breast mass was biopsied from the retroperitoneal area lower area and was also positive for invasive mammary carcinoma.  This measured at least 0.4 cm and also appeared high-grade.  The tumor is morphologically very similar to the tumor in the prior specimen.  Receptor studies were not done on this specimen.  The patient also had a palpable right axillary lymph node which was biopsied and showed metastatic mammary carcinoma which measured at least 1.3 cm in greatest dimension.  The patient had self palpated this breast mass.  In addition the patient has a palpable mass in the region of her right elbow which she reports has been present for 9 years but had been slowly growing especially over the past year or 2.  She denies any pain associated with this.    She is also status post total thyroidectomy for papillary thyroid carcinoma.  Today the patient reports that she continues to have lower abdominal pain.  She reports that she continues to have loose stools.  She has been taking p.o. Flagyl as recommended.  She had been unable to give any stool  studies for evaluation for C difficile colitis at the time of her emergency room visit a few days ago.  She reports that she has mild nausea but no significant vomiting.  She reports that she has been able to keep up with good p.o. intake of fluids.  Her appetite has decreased.  She has had weight loss. She denies any sick contacts.  She reports chronic low  back pain.  She has been having chronic problems with anxiety and depression.  However she denies any suicidal or homicidal ideation.  She reports chronic fatigue. She is taking norco PRN neoplasm related pain.  She denies any fevers or chills. Denies night sweats.  She denies any melena, hematochezia, hematemesis, hemoptysis or hematuria. She denies cough.  She is currently holding ibrance and letrozole because of her symptoms with regard to diarrhea. Ibrance was started on 2/21/17.  She reports that she had not been taking her supplemental potassium as advised.    PAST MEDICAL HISTORY:   1. HSIL on pap smear s/p LEEP  2. Retinal hemorrhage in left eye    SURGICAL HISTORY:   1.  Tonsillectomy and adenoidectomy  2.  Appendectomy  3.  Cholecystectomy  4.  D&C  5.  Thyroidectomy on August 31, 2017  6.  Right axillary hydradenitis/cellulitis and left inguinal hydradenitis/cellulitis s/p debridement and skin grafting    FAMILY HISTORY: She reports that her maternal aunt had lung cancer in her 70s.  She used to smoke cigarettes.  Her maternal cousin had lung cancer in her 60s.  She used to smoke cigarettes.  She denies any other immediate family members with cancer or bleeding/clotting disorders.    SOCIAL HISTORY: She reports a 46+ pack year smoking history. She drinks wine occasionally.  She denies any history of recreational drug use.  She is engaged and has 2 sons.  She used to work for the [employer].  She lives in [place], [place].     ALLERGIES: [NKDA]    MEDICATIONS: [Medcard has been reviewed and/or reconciled.]    REVIEW OF SYSTEMS:   GENERAL: [No  fevers, chills or sweats. Reports fatigue. Reports weight gain. Denies loss of appetite.]  HEENT: [No blurred vision, tinnitus, nasal discharge, sorethroat or dysphagia.]  HEART: [No chest pain, palpitations or shortness of breath.]    LUNGS: [Reports chronic cough. Denies hemoptysis or breathing problems.]  ABDOMEN: [No abdominal pain, nausea, vomiting, diarrhea, constipation or melena.]  GENITOURINARY: [No dysuria, bleeding or malodorous discharge.]  NEURO: [Reports headache. Denies dizziness or vertigo.]  HEMATOLOGY: [No easy bruising, spontaneous bleeding or blood clots in the past].  MUSCULOSKELETAL: [Reports arthralgias, myalgias and bone pains.]  SKIN: [No rashes or skin lesions.]  PSYCHIATRY: [Reports depression. Reports anxiety.]  Denies suicidal/homicidal ideation.    PHYSICAL EXAMINATION:   VS: Reviewed on nurse's notes.  APPEARANCE: The patient is a well-developed, well-nourished and well-groomed  female who appears in no acute distress.   HEENT: No scleral icterus. Both external auditory canals clear. No oral ulcers, lesions. Throat clear  HEAD: No sinus tenderness.  NECK: Supple. No palpable lymphadenopathy. Thyroid non-tender, no palpable masses  CHEST: Breath sounds clear bilaterally. No rales. No rhonchi. Unlabored respirations.  BREAST: Deferred today.  CARDIOVASCULAR: Normal S1, S2. Normal rate. Regular rhythm.  ABDOMEN: Bowel sounds normal. No tenderness. No abdominal distention. No hepatomegaly. No splenomegaly.  BACK: Palpable tenderness in the lower back.  LYMPHATIC: No palpable supraclavicular lymphadenopathy.   EXTREMITIES: No clubbing, cyanosis. Palpable firm 3 cm mass in the right elbow is noted. Trace edema in bilateral lower extremities.      SKIN: No lesions. No petechiae. No ecchymoses.   NEUROLOGIC: No focal findings. Alert & Oriented x 3. Mood appropriate to affect    LABS:   Reviewed    IMAGING:  Reviewed    IMPRESSION:  1.  Metastatic multifocal infiltrating ductal  carcinoma of the right breast with right axillary and bone involvement [ER positive/AK positive/HER-2 negative]  2.  Papillary thyroid carcinoma  3.  Tobacco abuse  4.  Anxiety and depression  5.  Diarrhea  6.  Loss of appetite  7.  Generalized weakness/fatigue  8.  Nausea  9.  Hypokalemia    PLAN:  1.  I had a detailed discussion with the patient previously with regard to the diagnosis, prognosis and treatment options for metastatic multifocal invasive ductal carcinoma of the right breast with right axillary and bone involvement.  2.  I have discussed that at this time her metastatic malignancy is potentially treatable but not curable.  3.  The patient was previously encouraged to quit smoking.  She expressed understanding.  Prescription for Xanax when necessary anxiety was previously given to the patient after full discussion of sedation precautions.    4. FSH and estradiol levels confirm menopausal status. TSH lab was normal.   5.  MRI of the brain on 1/31/17 to evaluate for any evidence of metastatic disease to complete staging workup was negative for metastatic malignancy to the brain. CT head done in March 2017 in ED was also ok.  6.  At this time I have recommended that she restart letrozole.  I have advised her to continue to hold ibrance until resolution of acute illness.   7. She has been advised to take calcium and vit d supplementation everyday. She is on xgeva injections for treatment of bone involvement by malignancy. Risks/benefits and common side effects discussed and she expressed understanding and agreement. She has dentures.  I will hold Xgeva today because of her acute illness and hypocalcemia.  8. Continue Lasix PRN for treatment of bilateral lower extremity edema.  Supplemental potassium was given as well.  Advised to use compression stockings and elevate legs.  9.  She will continue follow up with with Dr. Jason with endocrinology for follow up of papillary thyroid carcinoma.    10.  Results  of MRI of the thoracic and lumbar spine from 9/21/17 for evaluation of current acute symptoms related to back pain reviewed in detail with pateint. No acute process involving the spinal cord is noted. She does have bone involvement by malignancy in multiple areas. However the PET/CT done on 9/27/17 shows no evidence of new areas of bony or visceral involvement.  Hence there appears to be no evidence of metastatic disease progression.  However since the patient continues to have low back pain and MRI of lumbar spine does show enhancing marrow replacing metastatic lesions are seen in the bilateral iliac bones posteriorly. She did see radiation oncology and completed palliative XRT to this location which helped significantly with her symptoms.    11.  The patient will continue follow up with Dr. Guerin with clinical psychology for her chronic anxiety and depression.  The patient has been advised to seek immediate medical attention if any suicidal/homicidal ideation.  She expressed understanding.  12. CT scans done in May 2018 shows that her metastatic breast cancer continues to be stable/well controlled. I will plan to recheck in in the near future once resolution of acute illness.    13.  Continue Ophthalmology follow up as scheduled.  14.  I will check stool studies today.  Prescription for cholestyramine was given to the patient today.  She will continue and complete Flagyl.  If she does have C difficile colitis I will consider switching to p.o. vancomycin.  Importance of submitting stool samples was discussed in detail and she expressed understanding.  She has also been advised to keep herself well hydrated.  IV fluid hydration was offered today but the patient declined.    Return to clinic in 10 days with labs to discuss further management and for her next dose of Xgeva.  She knows to call sooner for any new problems or questions.    Richard Delong MD

## 2018-10-02 ENCOUNTER — INFUSION (OUTPATIENT)
Dept: INFUSION THERAPY | Facility: HOSPITAL | Age: 48
End: 2018-10-02
Attending: INTERNAL MEDICINE
Payer: MEDICAID

## 2018-10-02 ENCOUNTER — OFFICE VISIT (OUTPATIENT)
Dept: HEMATOLOGY/ONCOLOGY | Facility: CLINIC | Age: 48
End: 2018-10-02
Payer: MEDICAID

## 2018-10-02 VITALS
WEIGHT: 205 LBS | OXYGEN SATURATION: 98 % | TEMPERATURE: 98 F | RESPIRATION RATE: 18 BRPM | HEIGHT: 67 IN | SYSTOLIC BLOOD PRESSURE: 98 MMHG | BODY MASS INDEX: 32.18 KG/M2 | HEART RATE: 100 BPM | DIASTOLIC BLOOD PRESSURE: 64 MMHG

## 2018-10-02 DIAGNOSIS — G89.3 NEOPLASM RELATED PAIN: ICD-10-CM

## 2018-10-02 DIAGNOSIS — C50.811 MALIGNANT NEOPLASM OF OVERLAPPING SITES OF RIGHT BREAST IN FEMALE, ESTROGEN RECEPTOR POSITIVE: Primary | ICD-10-CM

## 2018-10-02 DIAGNOSIS — C79.51 SECONDARY CANCER OF BONE: Primary | ICD-10-CM

## 2018-10-02 DIAGNOSIS — C50.811 MALIGNANT NEOPLASM OF OVERLAPPING SITES OF RIGHT BREAST IN FEMALE, ESTROGEN RECEPTOR POSITIVE: ICD-10-CM

## 2018-10-02 DIAGNOSIS — Z17.0 MALIGNANT NEOPLASM OF OVERLAPPING SITES OF RIGHT BREAST IN FEMALE, ESTROGEN RECEPTOR POSITIVE: Primary | ICD-10-CM

## 2018-10-02 DIAGNOSIS — Z17.0 MALIGNANT NEOPLASM OF OVERLAPPING SITES OF RIGHT BREAST IN FEMALE, ESTROGEN RECEPTOR POSITIVE: ICD-10-CM

## 2018-10-02 DIAGNOSIS — C77.3 MALIGNANT NEOPLASM METASTATIC TO LYMPH NODE OF AXILLA: ICD-10-CM

## 2018-10-02 DIAGNOSIS — C79.51 SECONDARY CANCER OF BONE: ICD-10-CM

## 2018-10-02 PROCEDURE — 99999 PR PBB SHADOW E&M-EST. PATIENT-LVL IV: CPT | Mod: PBBFAC,,, | Performed by: INTERNAL MEDICINE

## 2018-10-02 PROCEDURE — 99214 OFFICE O/P EST MOD 30 MIN: CPT | Mod: PBBFAC,25,PO | Performed by: INTERNAL MEDICINE

## 2018-10-02 PROCEDURE — 99215 OFFICE O/P EST HI 40 MIN: CPT | Mod: 25,S$PBB,, | Performed by: INTERNAL MEDICINE

## 2018-10-02 PROCEDURE — 63600175 PHARM REV CODE 636 W HCPCS: Mod: JG,PO | Performed by: INTERNAL MEDICINE

## 2018-10-02 PROCEDURE — 90686 IIV4 VACC NO PRSV 0.5 ML IM: CPT | Mod: ,,, | Performed by: INTERNAL MEDICINE

## 2018-10-02 PROCEDURE — 90471 IMMUNIZATION ADMIN: CPT | Mod: ,,, | Performed by: INTERNAL MEDICINE

## 2018-10-02 PROCEDURE — 96372 THER/PROPH/DIAG INJ SC/IM: CPT | Mod: PO

## 2018-10-02 RX ADMIN — DENOSUMAB 120 MG: 120 INJECTION SUBCUTANEOUS at 03:10

## 2018-10-02 NOTE — PROGRESS NOTES
Reason for visit: Right breast carcinoma  Date of Diagnosis: January 12, 2017    HPI:   The patient is a 47-year-old  female who presents to the hematology oncology clinic today for follow up for metastatic right breast carcinoma.    I have reviewed all of the patient's relevant clinical history available in the medical record and have utilized this in my evaluation and management recommendations today including the details of her recent visit to [facility] [place] [facility] on September 15, 2018.  The patient had 2 separate areas in the right breast that were biopsied area the first was a right breast mass at 11:00 which was 3 cm from the nipple which showed invasive mammary carcinoma.  The invasive carcinoma measured at least 1.2 cm in greatest dimension and appeared high-grade.  This tumor was ER positive/[place] positive/HER-2 negative.  The second breast mass was biopsied from the retroperitoneal area lower area and was also positive for invasive mammary carcinoma.  This measured at least 0.4 cm and also appeared high-grade.  The tumor is morphologically very similar to the tumor in the prior specimen.  Receptor studies were not done on this specimen.  The patient also had a palpable right axillary lymph node which was biopsied and showed metastatic mammary carcinoma which measured at least 1.3 cm in greatest dimension.  The patient had self palpated this breast mass.  In addition the patient has a palpable mass in the region of her right elbow which she reports has been present for 9 years but had been slowly growing especially over the past year or 2.  She denies any pain associated with this.    She is also status post total thyroidectomy for papillary thyroid carcinoma.  Today the patient reports that her lower abdominal pain has completely resolved.  She reports that her bowel movements are now normal. She has completed her treatment with p.o. Flagyl. She reports chronic low  back pain.   She has been having chronic problems with anxiety and depression.  However she denies any suicidal or homicidal ideation.  She reports chronic fatigue. She is taking norco PRN neoplasm related pain.  She denies any fevers or chills. Denies night sweats.  She denies any melena, hematochezia, hematemesis, hemoptysis or hematuria. She denies cough.  She is currently holding ibrance.  She continues on letrozole. Ibrance was started on 2/21/17.    PAST MEDICAL HISTORY:   1. HSIL on pap smear s/p LEEP  2. Retinal hemorrhage in left eye    SURGICAL HISTORY:   1.  Tonsillectomy and adenoidectomy  2.  Appendectomy  3.  Cholecystectomy  4.  D&C  5.  Thyroidectomy on August 31, 2017  6.  Right axillary hydradenitis/cellulitis and left inguinal hydradenitis/cellulitis s/p debridement and skin grafting    FAMILY HISTORY: She reports that her maternal aunt had lung cancer in her 70s.  She used to smoke cigarettes.  Her maternal cousin had lung cancer in her 60s.  She used to smoke cigarettes.  She denies any other immediate family members with cancer or bleeding/clotting disorders.    SOCIAL HISTORY: She reports a 46+ pack year smoking history. She drinks wine occasionally.  She denies any history of recreational drug use.  She is engaged and has 2 sons.  She used to work for the [employer].  She lives in [place], [place].     ALLERGIES: [NKDA]    MEDICATIONS: [Medcard has been reviewed and/or reconciled.]    REVIEW OF SYSTEMS:   GENERAL: [No fevers, chills or sweats. Reports fatigue. Reports weight gain. Denies loss of appetite.]  HEENT: [No blurred vision, tinnitus, nasal discharge, sorethroat or dysphagia.]  HEART: [No chest pain, palpitations or shortness of breath.]    LUNGS: [Reports chronic cough. Denies hemoptysis or breathing problems.]  ABDOMEN: [No abdominal pain, nausea, vomiting, diarrhea, constipation or melena.]  GENITOURINARY: [No dysuria, bleeding or malodorous discharge.]  NEURO: [Reports headache.  Denies dizziness or vertigo.]  HEMATOLOGY: [No easy bruising, spontaneous bleeding or blood clots in the past].  MUSCULOSKELETAL: [Reports arthralgias, myalgias and bone pains.]  SKIN: [No rashes or skin lesions.]  PSYCHIATRY: [Reports depression. Reports anxiety.]  Denies suicidal/homicidal ideation.    PHYSICAL EXAMINATION:   VS: Reviewed on nurse's notes.  APPEARANCE: The patient is a well-developed, well-nourished and well-groomed  female who appears in no acute distress.   HEENT: No scleral icterus. Both external auditory canals clear. No oral ulcers, lesions. Throat clear  HEAD: No sinus tenderness.  NECK: Supple. No palpable lymphadenopathy. Thyroid non-tender, no palpable masses  CHEST: Breath sounds clear bilaterally. No rales. No rhonchi. Unlabored respirations.  BREAST: Deferred today.  CARDIOVASCULAR: Normal S1, S2. Normal rate. Regular rhythm.  ABDOMEN: Bowel sounds normal. No tenderness. No abdominal distention. No hepatomegaly. No splenomegaly.  BACK: Palpable tenderness in the lower back.  LYMPHATIC: No palpable supraclavicular lymphadenopathy.   EXTREMITIES: No clubbing, cyanosis. Palpable firm 3 cm mass in the right elbow is noted. Trace edema in bilateral lower extremities.      SKIN: No lesions. No petechiae. No ecchymoses.   NEUROLOGIC: No focal findings. Alert & Oriented x 3. Mood appropriate to affect    LABS:   Reviewed    IMAGING:  Reviewed    IMPRESSION:  1.  Metastatic multifocal infiltrating ductal carcinoma of the right breast with right axillary and bone involvement [ER positive/SC positive/HER-2 negative]  2.  Papillary thyroid carcinoma  3.  Tobacco abuse  4.  Anxiety and depression    PLAN:  1.  I had a detailed discussion with the patient previously with regard to the diagnosis, prognosis and treatment options for metastatic multifocal invasive ductal carcinoma of the right breast with right axillary and bone involvement.  2.  I have discussed that at this time her  metastatic malignancy is potentially treatable but not curable.  3.  The patient was previously encouraged to quit smoking.  She expressed understanding.  Prescription for Xanax when necessary anxiety was previously given to the patient after full discussion of sedation precautions.    4. FSH and estradiol levels confirm menopausal status. TSH lab was normal.   5.  MRI of the brain on 1/31/17 to evaluate for any evidence of metastatic disease to complete staging workup was negative for metastatic malignancy to the brain. CT head done in March 2017 in ED was also ok.  6.  At this time I have recommended that she restart ibrance and continue letrozole as well.  7. She has been advised to take calcium and vit d supplementation everyday. She is on xgeva injections for treatment of bone involvement by malignancy. Risks/benefits and common side effects discussed and she expressed understanding and agreement. She has dentures.  I will restart Xgeva today.  8. Continue Lasix PRN for treatment of bilateral lower extremity edema.  Supplemental potassium was given as well.  Advised to use compression stockings and elevate legs.  9.  She will continue follow up with with Dr. Jason with endocrinology for follow up of papillary thyroid carcinoma.    10.  Results of MRI of the thoracic and lumbar spine from 9/21/17 for evaluation of current acute symptoms related to back pain reviewed in detail with pateint. No acute process involving the spinal cord is noted. She does have bone involvement by malignancy in multiple areas. However the PET/CT done on 9/27/17 shows no evidence of new areas of bony or visceral involvement.  Hence there appears to be no evidence of metastatic disease progression.  However since the patient continues to have low back pain and MRI of lumbar spine does show enhancing marrow replacing metastatic lesions are seen in the bilateral iliac bones posteriorly. She did see radiation oncology and completed  palliative XRT to this location which helped significantly with her symptoms.    11.  The patient will continue follow up with Dr. Guerin with clinical psychology for her chronic anxiety and depression.  The patient has been advised to seek immediate medical attention if any suicidal/homicidal ideation.  She expressed understanding.  12. CT scans done in May 2018 shows that her metastatic breast cancer continues to be stable/well controlled. I will plan to recheck again in the next 1-2 months.    13.  Continue Ophthalmology follow up as scheduled.    Return to clinic in 1 month with labs to discuss further management and for her next dose of Xgeva.  She knows to call sooner for any new problems or questions.    Richard Delong MD

## 2018-10-05 DIAGNOSIS — F41.9 ANXIETY: ICD-10-CM

## 2018-10-05 RX ORDER — ALPRAZOLAM 0.5 MG/1
0.5 TABLET ORAL 2 TIMES DAILY PRN
Qty: 60 TABLET | Refills: 0 | Status: SHIPPED | OUTPATIENT
Start: 2018-10-05 | End: 2018-11-06 | Stop reason: SDUPTHER

## 2018-10-18 DIAGNOSIS — C79.51 SECONDARY CANCER OF BONE: ICD-10-CM

## 2018-10-18 DIAGNOSIS — G89.3 NEOPLASM RELATED PAIN: ICD-10-CM

## 2018-10-18 DIAGNOSIS — C77.3 MALIGNANT NEOPLASM METASTATIC TO LYMPH NODE OF AXILLA: ICD-10-CM

## 2018-10-18 DIAGNOSIS — C50.811 MALIGNANT NEOPLASM OF OVERLAPPING SITES OF RIGHT BREAST IN FEMALE, ESTROGEN RECEPTOR POSITIVE: ICD-10-CM

## 2018-10-18 DIAGNOSIS — Z17.0 MALIGNANT NEOPLASM OF OVERLAPPING SITES OF RIGHT BREAST IN FEMALE, ESTROGEN RECEPTOR POSITIVE: ICD-10-CM

## 2018-10-18 RX ORDER — HYDROCODONE BITARTRATE AND ACETAMINOPHEN 7.5; 325 MG/1; MG/1
1 TABLET ORAL EVERY 6 HOURS PRN
Qty: 120 TABLET | Refills: 0 | Status: SHIPPED | OUTPATIENT
Start: 2018-10-18 | End: 2018-11-16 | Stop reason: SDUPTHER

## 2018-10-31 DIAGNOSIS — G89.3 NEOPLASM RELATED PAIN: ICD-10-CM

## 2018-10-31 DIAGNOSIS — C50.811 MALIGNANT NEOPLASM OF OVERLAPPING SITES OF RIGHT BREAST IN FEMALE, ESTROGEN RECEPTOR POSITIVE: ICD-10-CM

## 2018-10-31 DIAGNOSIS — C79.51 SECONDARY CANCER OF BONE: ICD-10-CM

## 2018-10-31 DIAGNOSIS — C77.3 MALIGNANT NEOPLASM METASTATIC TO LYMPH NODE OF AXILLA: ICD-10-CM

## 2018-10-31 DIAGNOSIS — Z17.0 MALIGNANT NEOPLASM OF OVERLAPPING SITES OF RIGHT BREAST IN FEMALE, ESTROGEN RECEPTOR POSITIVE: ICD-10-CM

## 2018-10-31 RX ORDER — PALBOCICLIB 125 MG/1
CAPSULE ORAL
Qty: 21 CAPSULE | Refills: 2 | Status: SHIPPED | OUTPATIENT
Start: 2018-10-31 | End: 2019-01-11 | Stop reason: SDUPTHER

## 2018-11-06 DIAGNOSIS — F41.9 ANXIETY: ICD-10-CM

## 2018-11-06 RX ORDER — ALPRAZOLAM 0.5 MG/1
0.5 TABLET ORAL 2 TIMES DAILY PRN
Qty: 60 TABLET | Refills: 0 | Status: SHIPPED | OUTPATIENT
Start: 2018-11-06 | End: 2018-12-05 | Stop reason: SDUPTHER

## 2018-11-07 ENCOUNTER — INFUSION (OUTPATIENT)
Dept: INFUSION THERAPY | Facility: HOSPITAL | Age: 48
End: 2018-11-07
Attending: INTERNAL MEDICINE
Payer: MEDICAID

## 2018-11-07 ENCOUNTER — LAB VISIT (OUTPATIENT)
Dept: LAB | Facility: HOSPITAL | Age: 48
End: 2018-11-07
Attending: INTERNAL MEDICINE
Payer: MEDICAID

## 2018-11-07 ENCOUNTER — OFFICE VISIT (OUTPATIENT)
Dept: HEMATOLOGY/ONCOLOGY | Facility: CLINIC | Age: 48
End: 2018-11-07
Payer: MEDICAID

## 2018-11-07 VITALS
DIASTOLIC BLOOD PRESSURE: 72 MMHG | OXYGEN SATURATION: 98 % | RESPIRATION RATE: 20 BRPM | BODY MASS INDEX: 33.46 KG/M2 | TEMPERATURE: 98 F | HEIGHT: 67 IN | HEART RATE: 81 BPM | SYSTOLIC BLOOD PRESSURE: 112 MMHG | WEIGHT: 213.19 LBS

## 2018-11-07 DIAGNOSIS — C79.51 SECONDARY CANCER OF BONE: ICD-10-CM

## 2018-11-07 DIAGNOSIS — G89.3 NEOPLASM RELATED PAIN: ICD-10-CM

## 2018-11-07 DIAGNOSIS — C77.3 MALIGNANT NEOPLASM METASTATIC TO LYMPH NODE OF AXILLA: ICD-10-CM

## 2018-11-07 DIAGNOSIS — D75.89 MACROCYTOSIS WITHOUT ANEMIA: ICD-10-CM

## 2018-11-07 DIAGNOSIS — Z17.0 MALIGNANT NEOPLASM OF OVERLAPPING SITES OF RIGHT BREAST IN FEMALE, ESTROGEN RECEPTOR POSITIVE: Primary | ICD-10-CM

## 2018-11-07 DIAGNOSIS — C79.51 SECONDARY CANCER OF BONE: Primary | ICD-10-CM

## 2018-11-07 DIAGNOSIS — C50.811 MALIGNANT NEOPLASM OF OVERLAPPING SITES OF RIGHT BREAST IN FEMALE, ESTROGEN RECEPTOR POSITIVE: Primary | ICD-10-CM

## 2018-11-07 DIAGNOSIS — C50.811 MALIGNANT NEOPLASM OF OVERLAPPING SITES OF RIGHT BREAST IN FEMALE, ESTROGEN RECEPTOR POSITIVE: ICD-10-CM

## 2018-11-07 DIAGNOSIS — Z17.0 MALIGNANT NEOPLASM OF OVERLAPPING SITES OF RIGHT BREAST IN FEMALE, ESTROGEN RECEPTOR POSITIVE: ICD-10-CM

## 2018-11-07 LAB
ALBUMIN SERPL BCP-MCNC: 4 G/DL
ALP SERPL-CCNC: 62 U/L
ALT SERPL W/O P-5'-P-CCNC: 16 U/L
ANION GAP SERPL CALC-SCNC: 10 MMOL/L
AST SERPL-CCNC: 17 U/L
BASOPHILS # BLD AUTO: 0.05 K/UL
BASOPHILS NFR BLD: 1.2 %
BILIRUB SERPL-MCNC: 0.5 MG/DL
BUN SERPL-MCNC: 12 MG/DL
CALCIUM SERPL-MCNC: 9.8 MG/DL
CHLORIDE SERPL-SCNC: 102 MMOL/L
CO2 SERPL-SCNC: 26 MMOL/L
CREAT SERPL-MCNC: 1.3 MG/DL
DIFFERENTIAL METHOD: ABNORMAL
EOSINOPHIL # BLD AUTO: 0.1 K/UL
EOSINOPHIL NFR BLD: 1.9 %
ERYTHROCYTE [DISTWIDTH] IN BLOOD BY AUTOMATED COUNT: 14.3 %
EST. GFR  (AFRICAN AMERICAN): 55 ML/MIN/1.73 M^2
EST. GFR  (NON AFRICAN AMERICAN): 48 ML/MIN/1.73 M^2
GLUCOSE SERPL-MCNC: 98 MG/DL
HCT VFR BLD AUTO: 38.8 %
HGB BLD-MCNC: 13.5 G/DL
LYMPHOCYTES # BLD AUTO: 1.4 K/UL
LYMPHOCYTES NFR BLD: 31.5 %
MCH RBC QN AUTO: 38.2 PG
MCHC RBC AUTO-ENTMCNC: 34.8 G/DL
MCV RBC AUTO: 110 FL
MONOCYTES # BLD AUTO: 0.3 K/UL
MONOCYTES NFR BLD: 5.8 %
NEUTROPHILS # BLD AUTO: 2.6 K/UL
NEUTROPHILS NFR BLD: 59.6 %
PLATELET # BLD AUTO: 287 K/UL
PMV BLD AUTO: 10.4 FL
POTASSIUM SERPL-SCNC: 4.3 MMOL/L
PROT SERPL-MCNC: 7.7 G/DL
RBC # BLD AUTO: 3.53 M/UL
SODIUM SERPL-SCNC: 138 MMOL/L
WBC # BLD AUTO: 4.29 K/UL

## 2018-11-07 PROCEDURE — 99214 OFFICE O/P EST MOD 30 MIN: CPT | Mod: PBBFAC,25 | Performed by: INTERNAL MEDICINE

## 2018-11-07 PROCEDURE — 80053 COMPREHEN METABOLIC PANEL: CPT

## 2018-11-07 PROCEDURE — 85025 COMPLETE CBC W/AUTO DIFF WBC: CPT

## 2018-11-07 PROCEDURE — 99999 PR PBB SHADOW E&M-EST. PATIENT-LVL IV: CPT | Mod: PBBFAC,,, | Performed by: INTERNAL MEDICINE

## 2018-11-07 PROCEDURE — 63600175 PHARM REV CODE 636 W HCPCS: Mod: JG | Performed by: INTERNAL MEDICINE

## 2018-11-07 PROCEDURE — 99215 OFFICE O/P EST HI 40 MIN: CPT | Mod: 25,S$PBB,, | Performed by: INTERNAL MEDICINE

## 2018-11-07 PROCEDURE — 36415 COLL VENOUS BLD VENIPUNCTURE: CPT

## 2018-11-07 PROCEDURE — 96372 THER/PROPH/DIAG INJ SC/IM: CPT

## 2018-11-07 RX ADMIN — DENOSUMAB 120 MG: 120 INJECTION SUBCUTANEOUS at 02:11

## 2018-11-07 NOTE — NURSING
..Injection given without difficulties.Bandaid applied. Patient instructed to stay in the clinic for 15 minutes. Patient verbalized understanding and will notify nurse with any complaints.

## 2018-11-07 NOTE — PROGRESS NOTES
Reason for visit: Right breast carcinoma  Date of Diagnosis: January 12, 2017    HPI:   The patient is a 47-year-old  female who presents to the hematology oncology clinic today for follow up for metastatic right breast carcinoma.    I have reviewed all of the patient's relevant clinical history available in the medical record and have utilized this in my evaluation and management recommendations today including the details of her recent visit to [facility] [place] [facility] on September 15, 2018.  The patient had 2 separate areas in the right breast that were biopsied area the first was a right breast mass at 11:00 which was 3 cm from the nipple which showed invasive mammary carcinoma.  The invasive carcinoma measured at least 1.2 cm in greatest dimension and appeared high-grade.  This tumor was ER positive/MI positive/HER-2 negative.  The second breast mass was biopsied from the retroperitoneal area lower area and was also positive for invasive mammary carcinoma.  This measured at least 0.4 cm and also appeared high-grade.  The tumor is morphologically very similar to the tumor in the prior specimen.  Receptor studies were not done on this specimen.  The patient also had a palpable right axillary lymph node which was biopsied and showed metastatic mammary carcinoma which measured at least 1.3 cm in greatest dimension.  The patient had self palpated this breast mass.  In addition the patient has a palpable mass in the region of her right elbow which she reports has been present for 9 years but had been slowly growing especially over the past year or 2.  She denies any pain associated with this.    She is also status post total thyroidectomy for papillary thyroid carcinoma.  Today the patient reports that her lower abdominal pain has completely resolved.  She reports that her bowel movements are now normal. She has completed her treatment with p.o. Flagyl. She reports chronic low  back pain.   She has been having chronic problems with anxiety and depression.  However she denies any suicidal or homicidal ideation.  She reports chronic fatigue. She is taking norco PRN neoplasm related pain.  She denies any fevers or chills. Denies night sweats.  She denies any melena, hematochezia, hematemesis, hemoptysis or hematuria. She denies cough.  She is currently holding ibrance.  She continues on letrozole. Ibrance was started on 2/21/17.    PAST MEDICAL HISTORY:   1. HSIL on pap smear s/p LEEP  2. Retinal hemorrhage in left eye    SURGICAL HISTORY:   1.  Tonsillectomy and adenoidectomy  2.  Appendectomy  3.  Cholecystectomy  4.  D&C  5.  Thyroidectomy on August 31, 2017  6.  Right axillary hydradenitis/cellulitis and left inguinal hydradenitis/cellulitis s/p debridement and skin grafting    FAMILY HISTORY: She reports that her maternal aunt had lung cancer in her 70s.  She used to smoke cigarettes.  Her maternal cousin had lung cancer in her 60s.  She used to smoke cigarettes.  She denies any other immediate family members with cancer or bleeding/clotting disorders.    SOCIAL HISTORY: She reports a 46+ pack year smoking history. She drinks wine occasionally.  She denies any history of recreational drug use.  She is engaged and has 2 sons.  She used to work for [employer].  She lives in [place], [place].     ALLERGIES: [NKDA]    MEDICATIONS: [Medcard has been reviewed and/or reconciled.]    REVIEW OF SYSTEMS:   GENERAL: [No fevers, chills or sweats. Reports fatigue. Reports weight gain. Denies loss of appetite.]  HEENT: [No blurred vision, tinnitus, nasal discharge, sorethroat or dysphagia.]  HEART: [No chest pain, palpitations or shortness of breath.]    LUNGS: [Reports chronic cough. Denies hemoptysis or breathing problems.]  ABDOMEN: [No abdominal pain, nausea, vomiting, diarrhea, constipation or melena.]  GENITOURINARY: [No dysuria, bleeding or malodorous discharge.]  NEURO: [Reports headache.  Denies dizziness or vertigo.]  HEMATOLOGY: [No easy bruising, spontaneous bleeding or blood clots in the past].  MUSCULOSKELETAL: [Reports arthralgias, myalgias and bone pains.]  SKIN: [No rashes or skin lesions.]  PSYCHIATRY: [Reports depression. Reports anxiety.]  Denies suicidal/homicidal ideation.    PHYSICAL EXAMINATION:   VS: Reviewed on nurse's notes.  APPEARANCE: The patient is a well-developed, well-nourished and well-groomed  female who appears in no acute distress.   HEENT: No scleral icterus. Both external auditory canals clear. No oral ulcers, lesions. Throat clear  HEAD: No sinus tenderness.  NECK: Supple. No palpable lymphadenopathy. Thyroid non-tender, no palpable masses  CHEST: Breath sounds clear bilaterally. No rales. No rhonchi. Unlabored respirations.  BREAST: Deferred today.  CARDIOVASCULAR: Normal S1, S2. Normal rate. Regular rhythm.  ABDOMEN: Bowel sounds normal. No tenderness. No abdominal distention. No hepatomegaly. No splenomegaly.  BACK: Palpable tenderness in the lower back.  LYMPHATIC: No palpable supraclavicular lymphadenopathy.   EXTREMITIES: No clubbing, cyanosis. Palpable firm 3 cm mass in the right elbow is noted. Trace edema in bilateral lower extremities.      SKIN: No lesions. No petechiae. No ecchymoses.   NEUROLOGIC: No focal findings. Alert & Oriented x 3. Mood appropriate to affect    LABS:   Reviewed    IMAGING:  Reviewed    IMPRESSION:  1.  Metastatic multifocal infiltrating ductal carcinoma of the right breast with right axillary and bone involvement [ER positive/OR positive/HER-2 negative]  2.  Papillary thyroid carcinoma  3.  Tobacco abuse  4.  Anxiety and depression    PLAN:  1.  I had a detailed discussion with the patient previously with regard to the diagnosis, prognosis and treatment options for metastatic multifocal invasive ductal carcinoma of the right breast with right axillary and bone involvement.  2.  I have discussed that at this time her  metastatic malignancy is potentially treatable but not curable.  3.  The patient was previously encouraged to quit smoking.  She expressed understanding.  Prescription for Xanax when necessary anxiety was previously given to the patient after full discussion of sedation precautions.    4. FSH and estradiol levels confirm menopausal status. TSH lab was normal.   5.  MRI of the brain on 1/31/17 to evaluate for any evidence of metastatic disease to complete staging workup was negative for metastatic malignancy to the brain. CT head done in March 2017 in ED was also ok.  6.  At this time I have recommended that she restart ibrance and continue letrozole as well.  7. She has been advised to take calcium and vit d supplementation everyday. She is on xgeva injections for treatment of bone involvement by malignancy. Risks/benefits and common side effects discussed and she expressed understanding and agreement. She has dentures.  I will restart Xgeva today.  8. Continue Lasix PRN for treatment of bilateral lower extremity edema.  Supplemental potassium was given as well.  Advised to use compression stockings and elevate legs.  9.  She will continue follow up with with Dr. Jason with endocrinology for follow up of papillary thyroid carcinoma.    10.  Results of MRI of the thoracic and lumbar spine from 9/21/17 for evaluation of current acute symptoms related to back pain reviewed in detail with pateint. No acute process involving the spinal cord is noted. She does have bone involvement by malignancy in multiple areas. However the PET/CT done on 9/27/17 shows no evidence of new areas of bony or visceral involvement.  Hence there appears to be no evidence of metastatic disease progression.  However since the patient continues to have low back pain and MRI of lumbar spine does show enhancing marrow replacing metastatic lesions are seen in the bilateral iliac bones posteriorly. She did see radiation oncology and completed  palliative XRT to this location which helped significantly with her symptoms.    11.  The patient will continue follow up with Dr. Guerin with clinical psychology for her chronic anxiety and depression.  The patient has been advised to seek immediate medical attention if any suicidal/homicidal ideation.  She expressed understanding.  12. CT scans done in Sep 2018 shows that her metastatic breast cancer continues to be stable/well controlled. I will plan to recheck again in Jan 2019.      Return to clinic in 1 month with labs to discuss further management and for her next dose of Xgeva.  She knows to call sooner for any new problems or questions.    Richard Delong MD

## 2018-11-14 ENCOUNTER — TELEPHONE (OUTPATIENT)
Dept: ENDOCRINOLOGY | Facility: CLINIC | Age: 48
End: 2018-11-14

## 2018-11-14 NOTE — TELEPHONE ENCOUNTER
Pt stated that she could no longer wait due to another MD appt. Appt was scheduled for another date. Pt verbalized understanding.

## 2018-11-16 ENCOUNTER — TELEPHONE (OUTPATIENT)
Dept: ENDOCRINOLOGY | Facility: CLINIC | Age: 48
End: 2018-11-16

## 2018-11-16 DIAGNOSIS — Z17.0 MALIGNANT NEOPLASM OF OVERLAPPING SITES OF RIGHT BREAST IN FEMALE, ESTROGEN RECEPTOR POSITIVE: ICD-10-CM

## 2018-11-16 DIAGNOSIS — C79.51 SECONDARY CANCER OF BONE: ICD-10-CM

## 2018-11-16 DIAGNOSIS — G89.3 NEOPLASM RELATED PAIN: ICD-10-CM

## 2018-11-16 DIAGNOSIS — C50.811 MALIGNANT NEOPLASM OF OVERLAPPING SITES OF RIGHT BREAST IN FEMALE, ESTROGEN RECEPTOR POSITIVE: ICD-10-CM

## 2018-11-16 DIAGNOSIS — C77.3 MALIGNANT NEOPLASM METASTATIC TO LYMPH NODE OF AXILLA: ICD-10-CM

## 2018-11-16 RX ORDER — HYDROCODONE BITARTRATE AND ACETAMINOPHEN 7.5; 325 MG/1; MG/1
1 TABLET ORAL EVERY 6 HOURS PRN
Qty: 120 TABLET | Refills: 0 | Status: SHIPPED | OUTPATIENT
Start: 2018-11-16 | End: 2018-12-12 | Stop reason: SDUPTHER

## 2018-11-16 NOTE — TELEPHONE ENCOUNTER
----- Message from Juliet Gale MA sent at 11/16/2018  3:49 PM CST -----      ----- Message -----  From: Ashleigh Alejandro  Sent: 11/16/2018   2:17 PM  To: [facility] Ramos Staff    returned call...[phone]

## 2018-12-05 DIAGNOSIS — F41.9 ANXIETY: ICD-10-CM

## 2018-12-05 RX ORDER — ALPRAZOLAM 0.5 MG/1
0.5 TABLET ORAL 2 TIMES DAILY PRN
Qty: 60 TABLET | Refills: 0 | Status: SHIPPED | OUTPATIENT
Start: 2018-12-05 | End: 2019-01-04 | Stop reason: SDUPTHER

## 2018-12-08 ENCOUNTER — HOSPITAL ENCOUNTER (EMERGENCY)
Facility: HOSPITAL | Age: 48
Discharge: HOME | End: 2018-12-08
Attending: EMERGENCY MEDICINE
Payer: MEDICAID

## 2018-12-08 VITALS
HEART RATE: 88 BPM | WEIGHT: 210 LBS | TEMPERATURE: 98 F | RESPIRATION RATE: 20 BRPM | HEIGHT: 67 IN | OXYGEN SATURATION: 96 % | DIASTOLIC BLOOD PRESSURE: 72 MMHG | BODY MASS INDEX: 32.96 KG/M2 | SYSTOLIC BLOOD PRESSURE: 138 MMHG

## 2018-12-08 DIAGNOSIS — J06.9 URI, ACUTE: Primary | ICD-10-CM

## 2018-12-08 DIAGNOSIS — R07.81 RIB PAIN: ICD-10-CM

## 2018-12-08 DIAGNOSIS — R07.9 CHEST PAIN: ICD-10-CM

## 2018-12-08 LAB
ALBUMIN SERPL BCP-MCNC: 4 G/DL
ALP SERPL-CCNC: 65 U/L
ALT SERPL W/O P-5'-P-CCNC: 48 U/L
ANION GAP SERPL CALC-SCNC: 12 MMOL/L
AST SERPL-CCNC: 46 U/L
BASOPHILS # BLD AUTO: 0.01 K/UL
BASOPHILS NFR BLD: 0.4 %
BILIRUB SERPL-MCNC: 0.6 MG/DL
BUN SERPL-MCNC: 11 MG/DL
CALCIUM SERPL-MCNC: 8.3 MG/DL
CHLORIDE SERPL-SCNC: 103 MMOL/L
CO2 SERPL-SCNC: 21 MMOL/L
CREAT SERPL-MCNC: 1.2 MG/DL
DIFFERENTIAL METHOD: ABNORMAL
EOSINOPHIL # BLD AUTO: 0.1 K/UL
EOSINOPHIL NFR BLD: 1.8 %
ERYTHROCYTE [DISTWIDTH] IN BLOOD BY AUTOMATED COUNT: 14.7 %
EST. GFR  (AFRICAN AMERICAN): >60 ML/MIN/1.73 M^2
EST. GFR  (NON AFRICAN AMERICAN): 53 ML/MIN/1.73 M^2
GLUCOSE SERPL-MCNC: 110 MG/DL
HCT VFR BLD AUTO: 39.6 %
HGB BLD-MCNC: 14.4 G/DL
INR PPP: 1
LYMPHOCYTES # BLD AUTO: 0.9 K/UL
LYMPHOCYTES NFR BLD: 30.5 %
MCH RBC QN AUTO: 38.9 PG
MCHC RBC AUTO-ENTMCNC: 36.4 G/DL
MCV RBC AUTO: 107 FL
MONOCYTES # BLD AUTO: 0.3 K/UL
MONOCYTES NFR BLD: 10.9 %
NEUTROPHILS # BLD AUTO: 1.6 K/UL
NEUTROPHILS NFR BLD: 56.4 %
PLATELET # BLD AUTO: 278 K/UL
PMV BLD AUTO: 10.7 FL
POTASSIUM SERPL-SCNC: 3.4 MMOL/L
PROT SERPL-MCNC: 7.9 G/DL
PROTHROMBIN TIME: 10.5 SEC
RBC # BLD AUTO: 3.7 M/UL
SODIUM SERPL-SCNC: 136 MMOL/L
WBC # BLD AUTO: 2.85 K/UL

## 2018-12-08 PROCEDURE — 85025 COMPLETE CBC W/AUTO DIFF WBC: CPT

## 2018-12-08 PROCEDURE — 80053 COMPREHEN METABOLIC PANEL: CPT

## 2018-12-08 PROCEDURE — 85610 PROTHROMBIN TIME: CPT

## 2018-12-08 PROCEDURE — 99285 EMERGENCY DEPT VISIT HI MDM: CPT | Mod: 25

## 2018-12-08 PROCEDURE — 93010 ELECTROCARDIOGRAM REPORT: CPT | Mod: ,,, | Performed by: INTERNAL MEDICINE

## 2018-12-08 PROCEDURE — 93005 ELECTROCARDIOGRAM TRACING: CPT

## 2018-12-08 PROCEDURE — 36415 COLL VENOUS BLD VENIPUNCTURE: CPT

## 2018-12-08 PROCEDURE — 25500020 PHARM REV CODE 255: Performed by: EMERGENCY MEDICINE

## 2018-12-08 PROCEDURE — 25000003 PHARM REV CODE 250: Performed by: NURSE PRACTITIONER

## 2018-12-08 RX ORDER — TRAMADOL HYDROCHLORIDE 50 MG/1
50 TABLET ORAL EVERY 6 HOURS PRN
Qty: 12 TABLET | Refills: 0 | Status: SHIPPED | OUTPATIENT
Start: 2018-12-08 | End: 2018-12-12

## 2018-12-08 RX ORDER — HYDROCODONE BITARTRATE AND ACETAMINOPHEN 10; 325 MG/1; MG/1
1 TABLET ORAL
Status: COMPLETED | OUTPATIENT
Start: 2018-12-08 | End: 2018-12-08

## 2018-12-08 RX ADMIN — HYDROCODONE BITARTRATE AND ACETAMINOPHEN 1 TABLET: 10; 325 TABLET ORAL at 01:12

## 2018-12-08 RX ADMIN — IOHEXOL 100 ML: 350 INJECTION, SOLUTION INTRAVENOUS at 03:12

## 2018-12-08 NOTE — ED PROVIDER NOTES
Encounter Date: 12/8/2018       History     Chief Complaint   Patient presents with    Cough     non productive cough x 2 days    Diarrhea     diarrhea x 2 days     48 year old female with complaint of cough and congestion X 4 days.  Reports low grade fever 2 days.  No difficulty breathing.  Reports right lower rib pain with breathing.  Reports diarrhea a few days ago.  No vomiting. NO abdominal pain.            Review of patient's allergies indicates:   Allergen Reactions    Nsaids (non-steroidal anti-inflammatory drug) Other (See Comments)     Instructed not to take NSAID drugs with chemo pill.    Vancomycin analogues Itching     Past Medical History:   Diagnosis Date    Anxiety     Asthma     Cancer     right breast, metastatic-lymph nodes, bone, and thyroid     COPD (chronic obstructive pulmonary disease)     Depression     History of psychiatric hospitalization     2000; suicidal ideation    Hx of psychiatric care     Hypothyroidism     Miscarriage     five miscarriages    Obesity     Psychiatric problem     Thyroid cancer 2017     Past Surgical History:   Procedure Laterality Date    ABSCESS DRAINAGE      bilateral axilla    ADENOIDECTOMY      APPENDECTOMY      CHOLECYSTECTOMY      EXCHANGE-WOUND VAC Bilateral 5/19/2017    [facility]    EXCISION-HIDRADENITIS Bilateral 5/17/2017    [facility]    EXCISION-MASS-ARM Right 3/14/2018    [facility]    JWIMKEREOLFP-PHMCEPYZ-DTFZIKJIX N/A 12/14/2016    [facility]    INCISION AND DRAINAGE (I & D)-GROIN Left 6/16/2017    [facility]    INCISION AND DRAINAGE-THIGH Right 6/16/2017    [facility]    MASS EXCISION      PLACEMENT-WOUND VAC Bilateral 5/17/2017    [facility]    SHAVING-ENDOMETRIAL  12/14/2016     [facility]    SKIN GRAFT  2017    SKIN GRAFT-SPLIT THICKNESS Left 2017    [facility]    THYROIDECTOMY Bilateral     THYROIDECTOMY Bilateral 2017    [facility] at [facility] OR    TONSILLECTOMY       Family History   Problem Relation Age of Onset    Arthritis Mother     Early death Mother         64 at time of death    Heart attack Mother     Heart disease Mother     Heart failure Mother     Hypertension Mother     Coronary artery disease Father     Hearing loss Father     Heart disease Father     Hyperlipidemia Father     Hypertension Father     Mental illness Father     Learning disabilities Son      Social History     Tobacco Use    Smoking status: Current Every Day Smoker     Packs/day: 0.50     Years: 30.00     Pack years: 15.00     Types: Cigarettes     Last attempt to quit: 2017     Years since quittin.4    Smokeless tobacco: Never Used    Tobacco comment: 45 pack year history   Substance Use Topics    Alcohol use: No    Drug use: No     Review of Systems   Constitutional: Negative for fever.   HENT: Negative for sore throat.    Respiratory: Positive for cough. Negative for shortness of breath.    Cardiovascular: Negative for chest pain.   Gastrointestinal: Positive for diarrhea. Negative for nausea.   Genitourinary: Negative for dysuria.   Musculoskeletal: Negative for back pain.   Skin: Negative for rash.   Neurological: Negative for weakness.   Hematological: Does not bruise/bleed easily.       Physical Exam     Initial Vitals [18 1252]   BP Pulse Resp Temp SpO2   116/77 (!) 114 (!) 22 98.3 °F (36.8 °C) 98 %      MAP       --         Physical Exam    Nursing note and vitals reviewed.  Constitutional: She appears well-developed and well-nourished.   HENT:   Head: Normocephalic and atraumatic.   Eyes: Conjunctivae and EOM are normal. Pupils are equal, round, and reactive to light.    Neck: Normal range of motion. Neck supple.   Cardiovascular: Normal rate, regular rhythm, normal heart sounds and intact distal pulses.   Pulmonary/Chest: Breath sounds normal.   Abdominal: Soft. There is no tenderness. There is no rebound and no guarding.   Musculoskeletal: Normal range of motion.   Neurological: She is alert and oriented to person, place, and time. She has normal strength and normal reflexes.   Skin: Skin is warm and dry.   Psychiatric: She has a normal mood and affect. Her behavior is normal. Thought content normal.         ED Course   Procedures  Labs Reviewed   CBC W/ AUTO DIFFERENTIAL - Abnormal; Notable for the following components:       Result Value    WBC 2.85 (*)     RBC 3.70 (*)      (*)     MCH 38.9 (*)     MCHC 36.4 (*)     RDW 14.7 (*)     Gran # (ANC) 1.6 (*)     Lymph # 0.9 (*)     All other components within normal limits   COMPREHENSIVE METABOLIC PANEL - Abnormal; Notable for the following components:    Potassium 3.4 (*)     CO2 21 (*)     Calcium 8.3 (*)     AST 46 (*)     ALT 48 (*)     eGFR if non  53 (*)     All other components within normal limits   PROTIME-INR     EKG Readings: (Independently Interpreted)   Other EKG Interpretations: EKG: NSR with rate of 86, no st or t wave abnormalities       Imaging Results          CTA Chest Non-Coronary (PE Study) (Final result)  Result time 12/08/18 16:31:28    Final result by Arturo Jacobson MD (12/08/18 16:31:28)                 Impression:      No evidence of pulmonary embolism.  Remote granulomatous exposure with calcified nodules at the right lung base, evidence of calcified hilar lymph nodes, and splenic granuloma.  No acute infiltrate or consolidation.  No evidence of a pericardial effusion.      Electronically signed by: Arturo Jacobson MD  Date:    12/08/2018  Time:    16:31             Narrative:    EXAMINATION:  CTA CHEST NON CORONARY    CLINICAL HISTORY:  Chest pain, normal ekg;    TECHNIQUE:  CT of  the chest was acquired helically utilizing a low-dose technique from the lung apices through the posterior costophrenic angles status post administration of 100 cc of Omnipaque 350.  Bolus timing was utilized to optimize opacification of the pulmonary arterial system. 3-D maximum intensity projection and multiplanar reconstructions were created from the source data set and interpreted.    COMPARISON:  None    FINDINGS:  Mild dependent atelectasis at the lung bases.  Remote granulomatous disease within 8 mm calcified pulmonary nodule at the right lung base and evidence of bilateral calcified hilar lymph nodes.    The aorta demonstrates normal caliber and contour. There is good opacification of the pulmonary arterial system. No intraluminal filling defects are notified within the pulmonary arterial system to suggest pulmonary embolism. There is no pericardial effusion.  No enlarged mediastinal, hilar or axillary lymph nodes are identified.    Remote cholecystectomy.  Fatty infiltrated liver.  Multiple small calcified granuloma in the spleen    Degenerative changes of the vertebral endplates.                               X-Ray Chest PA And Lateral (Final result)  Result time 12/08/18 14:03:55    Final result by Zaid Brito MD (12/08/18 14:03:55)                 Impression:      No acute findings.      Electronically signed by: Zaid Brito MD  Date:    12/08/2018  Time:    14:03             Narrative:    EXAMINATION:  XR CHEST PA AND LATERAL    CLINICAL HISTORY:  Pleurodynia    TECHNIQUE:  PA and lateral views of the chest were performed.    COMPARISON:  09/10/2017    FINDINGS:  The cardiomediastinal silhouette is normal.  Calcified mediastinal lymph nodes typical of remote granulomatous infection.    Calcified peripheral right midlung granuloma.  No acute infiltrates.    Mild right convexity lumbar spine.                                  Imaging Results          CTA Chest Non-Coronary (PE Study) (Final result)   Result time 12/08/18 16:31:28    Final result by Arturo Jacobson MD (12/08/18 16:31:28)                 Impression:      No evidence of pulmonary embolism.  Remote granulomatous exposure with calcified nodules at the right lung base, evidence of calcified hilar lymph nodes, and splenic granuloma.  No acute infiltrate or consolidation.  No evidence of a pericardial effusion.      Electronically signed by: Arturo Jacobson MD  Date:    12/08/2018  Time:    16:31             Narrative:    EXAMINATION:  CTA CHEST NON CORONARY    CLINICAL HISTORY:  Chest pain, normal ekg;    TECHNIQUE:  CT of the chest was acquired helically utilizing a low-dose technique from the lung apices through the posterior costophrenic angles status post administration of 100 cc of Omnipaque 350.  Bolus timing was utilized to optimize opacification of the pulmonary arterial system. 3-D maximum intensity projection and multiplanar reconstructions were created from the source data set and interpreted.    COMPARISON:  None    FINDINGS:  Mild dependent atelectasis at the lung bases.  Remote granulomatous disease within 8 mm calcified pulmonary nodule at the right lung base and evidence of bilateral calcified hilar lymph nodes.    The aorta demonstrates normal caliber and contour. There is good opacification of the pulmonary arterial system. No intraluminal filling defects are notified within the pulmonary arterial system to suggest pulmonary embolism. There is no pericardial effusion.  No enlarged mediastinal, hilar or axillary lymph nodes are identified.    Remote cholecystectomy.  Fatty infiltrated liver.  Multiple small calcified granuloma in the spleen    Degenerative changes of the vertebral endplates.                               X-Ray Chest PA And Lateral (Final result)  Result time 12/08/18 14:03:55    Final result by Zaid Brito MD (12/08/18 14:03:55)                 Impression:      No acute findings.      Electronically signed by: Zaid  MD Daryl  Date:    12/08/2018  Time:    14:03             Narrative:    EXAMINATION:  XR CHEST PA AND LATERAL    CLINICAL HISTORY:  Pleurodynia    TECHNIQUE:  PA and lateral views of the chest were performed.    COMPARISON:  09/10/2017    FINDINGS:  The cardiomediastinal silhouette is normal.  Calcified mediastinal lymph nodes typical of remote granulomatous infection.    Calcified peripheral right midlung granuloma.  No acute infiltrates.    Mild right convexity lumbar spine.                                                     Clinical Impression:   The primary encounter diagnosis was URI, acute. Diagnoses of Rib pain and Chest pain were also pertinent to this visit.                             Kyle Quiroz NP  12/08/18 [id number]

## 2018-12-11 ENCOUNTER — OFFICE VISIT (OUTPATIENT)
Dept: DIABETES | Facility: CLINIC | Age: 48
End: 2018-12-11
Payer: MEDICAID

## 2018-12-11 VITALS
DIASTOLIC BLOOD PRESSURE: 72 MMHG | HEIGHT: 67 IN | SYSTOLIC BLOOD PRESSURE: 104 MMHG | BODY MASS INDEX: 34.05 KG/M2 | WEIGHT: 216.94 LBS

## 2018-12-11 DIAGNOSIS — C73 PAPILLARY THYROID CARCINOMA: Primary | ICD-10-CM

## 2018-12-11 PROCEDURE — 99214 OFFICE O/P EST MOD 30 MIN: CPT | Mod: S$PBB,,, | Performed by: PHYSICIAN ASSISTANT

## 2018-12-11 PROCEDURE — 99213 OFFICE O/P EST LOW 20 MIN: CPT | Mod: PBBFAC,PO | Performed by: PHYSICIAN ASSISTANT

## 2018-12-11 PROCEDURE — 99999 PR PBB SHADOW E&M-EST. PATIENT-LVL III: CPT | Mod: PBBFAC,,, | Performed by: PHYSICIAN ASSISTANT

## 2018-12-12 ENCOUNTER — OFFICE VISIT (OUTPATIENT)
Dept: HEMATOLOGY/ONCOLOGY | Facility: CLINIC | Age: 48
End: 2018-12-12
Payer: MEDICAID

## 2018-12-12 ENCOUNTER — LAB VISIT (OUTPATIENT)
Dept: LAB | Facility: HOSPITAL | Age: 48
End: 2018-12-12
Attending: INTERNAL MEDICINE
Payer: MEDICAID

## 2018-12-12 ENCOUNTER — INFUSION (OUTPATIENT)
Dept: INFUSION THERAPY | Facility: HOSPITAL | Age: 48
End: 2018-12-12
Attending: INTERNAL MEDICINE
Payer: MEDICAID

## 2018-12-12 VITALS
BODY MASS INDEX: 33.46 KG/M2 | WEIGHT: 213.63 LBS | TEMPERATURE: 97 F | WEIGHT: 213.63 LBS | BODY MASS INDEX: 33.53 KG/M2 | OXYGEN SATURATION: 98 % | HEART RATE: 75 BPM | SYSTOLIC BLOOD PRESSURE: 114 MMHG | DIASTOLIC BLOOD PRESSURE: 73 MMHG | HEIGHT: 67 IN

## 2018-12-12 DIAGNOSIS — C77.3 MALIGNANT NEOPLASM METASTATIC TO LYMPH NODE OF AXILLA: ICD-10-CM

## 2018-12-12 DIAGNOSIS — C50.811 MALIGNANT NEOPLASM OF OVERLAPPING SITES OF RIGHT BREAST IN FEMALE, ESTROGEN RECEPTOR POSITIVE: Primary | ICD-10-CM

## 2018-12-12 DIAGNOSIS — J01.10 ACUTE NON-RECURRENT FRONTAL SINUSITIS: ICD-10-CM

## 2018-12-12 DIAGNOSIS — Z17.0 MALIGNANT NEOPLASM OF OVERLAPPING SITES OF RIGHT BREAST IN FEMALE, ESTROGEN RECEPTOR POSITIVE: Primary | ICD-10-CM

## 2018-12-12 DIAGNOSIS — C50.811 MALIGNANT NEOPLASM OF OVERLAPPING SITES OF RIGHT BREAST IN FEMALE, ESTROGEN RECEPTOR POSITIVE: ICD-10-CM

## 2018-12-12 DIAGNOSIS — Z17.0 MALIGNANT NEOPLASM OF OVERLAPPING SITES OF RIGHT BREAST IN FEMALE, ESTROGEN RECEPTOR POSITIVE: ICD-10-CM

## 2018-12-12 DIAGNOSIS — D75.89 MACROCYTOSIS WITHOUT ANEMIA: ICD-10-CM

## 2018-12-12 DIAGNOSIS — G89.3 NEOPLASM RELATED PAIN: ICD-10-CM

## 2018-12-12 DIAGNOSIS — C79.51 SECONDARY CANCER OF BONE: ICD-10-CM

## 2018-12-12 DIAGNOSIS — C79.51 SECONDARY CANCER OF BONE: Primary | ICD-10-CM

## 2018-12-12 DIAGNOSIS — C73 PAPILLARY THYROID CARCINOMA: ICD-10-CM

## 2018-12-12 LAB
ALBUMIN SERPL BCP-MCNC: 3.8 G/DL
ALP SERPL-CCNC: 53 U/L
ALT SERPL W/O P-5'-P-CCNC: 48 U/L
ANION GAP SERPL CALC-SCNC: 10 MMOL/L
AST SERPL-CCNC: 30 U/L
BASOPHILS # BLD AUTO: 0.02 K/UL
BASOPHILS NFR BLD: 0.7 %
BILIRUB SERPL-MCNC: 0.5 MG/DL
BUN SERPL-MCNC: 7 MG/DL
CALCIUM SERPL-MCNC: 10.2 MG/DL
CHLORIDE SERPL-SCNC: 102 MMOL/L
CO2 SERPL-SCNC: 27 MMOL/L
CREAT SERPL-MCNC: 1.2 MG/DL
DIFFERENTIAL METHOD: ABNORMAL
EOSINOPHIL # BLD AUTO: 0 K/UL
EOSINOPHIL NFR BLD: 1 %
ERYTHROCYTE [DISTWIDTH] IN BLOOD BY AUTOMATED COUNT: 14.6 %
EST. GFR  (AFRICAN AMERICAN): >60 ML/MIN/1.73 M^2
EST. GFR  (NON AFRICAN AMERICAN): 53 ML/MIN/1.73 M^2
FOLATE SERPL-MCNC: 3.5 NG/ML
GLUCOSE SERPL-MCNC: 119 MG/DL
HCT VFR BLD AUTO: 35.3 %
HGB BLD-MCNC: 12.6 G/DL
LYMPHOCYTES # BLD AUTO: 1.1 K/UL
LYMPHOCYTES NFR BLD: 34.6 %
MCH RBC QN AUTO: 37.5 PG
MCHC RBC AUTO-ENTMCNC: 35.7 G/DL
MCV RBC AUTO: 105 FL
MONOCYTES # BLD AUTO: 0.3 K/UL
MONOCYTES NFR BLD: 9.5 %
NEUTROPHILS # BLD AUTO: 1.7 K/UL
NEUTROPHILS NFR BLD: 54.2 %
PLATELET # BLD AUTO: 221 K/UL
PMV BLD AUTO: 9.9 FL
POTASSIUM SERPL-SCNC: 3.1 MMOL/L
PROT SERPL-MCNC: 7 G/DL
RBC # BLD AUTO: 3.36 M/UL
SODIUM SERPL-SCNC: 139 MMOL/L
T3FREE SERPL-MCNC: 1.7 PG/ML
T4 FREE SERPL-MCNC: 1.21 NG/DL
TSH SERPL DL<=0.005 MIU/L-ACNC: 2.11 UIU/ML
VIT B12 SERPL-MCNC: 176 PG/ML
WBC # BLD AUTO: 3.06 K/UL

## 2018-12-12 PROCEDURE — 84439 ASSAY OF FREE THYROXINE: CPT

## 2018-12-12 PROCEDURE — 80053 COMPREHEN METABOLIC PANEL: CPT

## 2018-12-12 PROCEDURE — 36415 COLL VENOUS BLD VENIPUNCTURE: CPT

## 2018-12-12 PROCEDURE — 99215 OFFICE O/P EST HI 40 MIN: CPT | Mod: 25,S$PBB,, | Performed by: INTERNAL MEDICINE

## 2018-12-12 PROCEDURE — 99999 PR PBB SHADOW E&M-EST. PATIENT-LVL III: CPT | Mod: PBBFAC,,, | Performed by: INTERNAL MEDICINE

## 2018-12-12 PROCEDURE — 96372 THER/PROPH/DIAG INJ SC/IM: CPT

## 2018-12-12 PROCEDURE — 63600175 PHARM REV CODE 636 W HCPCS: Mod: JG | Performed by: INTERNAL MEDICINE

## 2018-12-12 PROCEDURE — 84432 ASSAY OF THYROGLOBULIN: CPT

## 2018-12-12 PROCEDURE — 84481 FREE ASSAY (FT-3): CPT

## 2018-12-12 PROCEDURE — 84443 ASSAY THYROID STIM HORMONE: CPT

## 2018-12-12 PROCEDURE — 99213 OFFICE O/P EST LOW 20 MIN: CPT | Mod: PBBFAC,25 | Performed by: INTERNAL MEDICINE

## 2018-12-12 PROCEDURE — 82607 VITAMIN B-12: CPT

## 2018-12-12 PROCEDURE — 85025 COMPLETE CBC W/AUTO DIFF WBC: CPT

## 2018-12-12 PROCEDURE — 82746 ASSAY OF FOLIC ACID SERUM: CPT

## 2018-12-12 RX ORDER — HYDROCODONE BITARTRATE AND ACETAMINOPHEN 7.5; 325 MG/1; MG/1
1 TABLET ORAL EVERY 6 HOURS PRN
Qty: 120 TABLET | Refills: 0 | Status: SHIPPED | OUTPATIENT
Start: 2018-12-12 | End: 2019-01-11 | Stop reason: SDUPTHER

## 2018-12-12 RX ADMIN — DENOSUMAB 120 MG: 120 INJECTION SUBCUTANEOUS at 02:12

## 2018-12-12 NOTE — DISCHARGE INSTRUCTIONS
[facility]  [address]  [address] [address]  [phone] phone     [phone] fax  Hours of Operation: Monday- Friday 8:00am- 5:00pm  After hours phone  [phone]  Hematology / Oncology Physicians on call      Dr. Jake Keith                        Please call with any concerns regarding your appointment today.      Calcium Supplements   Calcium is a mineral that helps us make strong bones and teeth. Most of the calcium in our bodies is in our bones. We spend almost half of our lives (30 to 35 years) building to a peak bone mass. Taking in enough calcium helps younger people build strong bones. Maintaining a safe calcium level helps older people limit bone loss. Our bodies cannot make calcium, so we must get it from foods or supplements.   Why Use a Supplement?   You might need a supplement if you fall in to one or more of the following categories:   I dont eat dairy products or other foods that are high in calcium (such as kale, bok andres, and calcium-fortified foods) 2 to 3 times a day.   I am a woman past menopause.   I am pregnant or breastfeeding.   I do not get frequent weight-bearing exercise (such as walking, running, or weightlifting).  Suggested Daily Intake   The daily recommended amount of calcium depends on many factors, including your age and sex.   Your health care provider can help you choose the best amount of calcium for you.    If You Take Calcium   Here are some tips to help you get the most from a calcium supplement:   Choose calcium carbonate or calcium citrate. (The citrate form is easy to absorb.) Avoid calcium from oyster shells. Aim for calcium that is plant-based.   Check the label for elemental calcium. You need 1,000 to 1,500 mg.   If you also take an iron supplement, take it a few hours before or after the calcium.   Be aware that taking calcium interferes with the absorption of certain types of  antibiotics. Talk to your pharmacist.   Eat a balanced diet to supply all the nutrients your body needs.  Food Sources of Calcium   Milk, yogurt, and cheese   Certain green leafy vegetables, such as kale, bok andres, and hu greens   Fish with bones, such as canned salmon, mackerel, and sardines   Tofu made with calcium carbonate (not the type called nigiri)   Drinks that have calcium added, such as some orange juice and rice and soy drinks    © 1468-2313 [facility], [address] [place] [address], [place] [address]. All rights reserved. This information is not intended as a substitute for professional medical care. Always follow your healthcare professional's instructions.         Remember to take your calcium with vitamin D supplement as directed.

## 2018-12-12 NOTE — PROGRESS NOTES
Subjective:      Patient ID: Josey Flores is a 48 y.o. female.    Chief Complaint: Thyroid Problem    Josey Flores is a pleasant 48 y.o. female presenting to follow up on Papillary carcinoma, follicular variant, 2 cm, [id number].  Second focus of micropapillary carcinoma adjacent to main tumor. She was last seen in 11/01/2017. Since last visit she has doing fine without any fatigue, flushing, tachycardia, tremors, proximal muscle weakness, visual disturbance, diarrhea or constipation, she does c/o cold tolerance. She was also treated for Metastatic multifocal infiltrating ductal carcinoma of the right breast with right axillary and bone involvement (ER positive/SD positive/HER-2 negative) prior to discovering her thyroid carcinoma. The following results were reviewed with her.    Lab Results       Component                Value               Date                       WBC                      2.85 (L)            12/08/2018                 HGB                      14.4                12/08/2018                 HCT                      39.6                12/08/2018                 PLT                      278                 12/08/2018                 ALT                      48 (H)              12/08/2018                 AST                      46 (H)              12/08/2018                 NA                       136                 12/08/2018                 K                        3.4 (L)             12/08/2018                 CL                       103                 12/08/2018                 CREATININE               1.2                 12/08/2018                 BUN                      11                  12/08/2018                 CO2                      21 (L)              12/08/2018                 TSH                      2.201               11/29/2017                 INR                      1.0                 12/08/2018              Lab Results       Component                Value                "Date                       T3FREE                   2.9                 11/29/2017            Lab Results       Component                Value               Date                       FREET4                   1.35                11/29/2017            Lab Results       Component                Value               Date                       TSH                      2.201               11/29/2017            Lab Results       Component                Value               Date                       PTH                      41.0                11/29/2017                 CALCIUM                  8.3 (L)             12/08/2018                 PHOS                     3.2                 11/29/2017                    Review of Systems   Constitutional: Negative.    HENT: Negative.    Eyes: Negative.    Respiratory: Negative.    Cardiovascular: Negative.    Gastrointestinal: Negative.    Endocrine: Negative.    Genitourinary: Negative.    Musculoskeletal: Negative.    Skin: Negative.    Allergic/Immunologic: Negative.    Neurological: Negative.    Hematological: Negative.    Psychiatric/Behavioral: Negative.         Review of patient's allergies indicates:   Allergen Reactions    Nsaids (non-steroidal anti-inflammatory drug) Other (See Comments)     Instructed not to take NSAID drugs with chemo pill.    Vancomycin analogues Itching       Past Medical History:   Diagnosis Date    Anxiety     Asthma     Cancer     right breast, metastatic-lymph nodes, bone, and thyroid     COPD (chronic obstructive pulmonary disease)     Depression     History of psychiatric hospitalization     2000; suicidal ideation    Hx of psychiatric care     Hypothyroidism     Miscarriage     five miscarriages    Obesity     Psychiatric problem     Thyroid cancer 2017       Objective:   /72 (BP Location: Right arm, Patient Position: Sitting, BP Method: Large (Manual))   Ht 5' 7" (1.702 m)   Wt 98.4 kg (216 lb 14.9 oz)   LMP  (LMP " Unknown) Comment: no cycle x3 years  BMI 33.98 kg/m²     Physical Exam   Constitutional: She is oriented to person, place, and time. She appears well-developed and well-nourished.   HENT:   Head: Normocephalic and atraumatic.   Right Ear: External ear normal.   Left Ear: External ear normal.   Nose: Nose normal.   Mouth/Throat: Oropharynx is clear and moist. No oropharyngeal exudate.   Eyes: Conjunctivae and EOM are normal. Pupils are equal, round, and reactive to light. Right eye exhibits no discharge. Left eye exhibits no discharge. No scleral icterus.   Neck: Normal range of motion. Neck supple. No JVD present. No tracheal deviation present. No thyromegaly present.   Cardiovascular: Normal rate, regular rhythm, normal heart sounds and intact distal pulses. Exam reveals no gallop and no friction rub.   No murmur heard.  Pulmonary/Chest: Effort normal and breath sounds normal. No stridor. No respiratory distress. She has no wheezes. She has no rales. She exhibits no tenderness.   Abdominal: Soft. Bowel sounds are normal. She exhibits no distension and no mass. There is no tenderness. There is no rebound and no guarding.   Musculoskeletal: Normal range of motion. She exhibits no edema or tenderness.   Lymphadenopathy:     She has no cervical adenopathy.   Neurological: She is alert and oriented to person, place, and time. She has normal reflexes. She displays normal reflexes. No cranial nerve deficit. She exhibits normal muscle tone. Coordination normal.   Skin: Skin is warm and dry. No rash noted. No erythema. No pallor.   Psychiatric: She has a normal mood and affect. Her behavior is normal. Judgment and thought content normal.   Nursing note and vitals reviewed.    Assessment:     1. Papillary thyroid carcinoma       Plan:   Josey Flores is seen today for   1. Papillary thyroid carcinoma      We have discussed the etiology and treatment options associated with the diagnosis as well as alternatives. She has elected  the following treatments.     Papillary thyroid carcinoma  -     TSH; Future; Expected date: 12/12/2018  -     T3, free; Future; Expected date: 12/12/2018  -     T4, free; Future; Expected date: 12/12/2018  -     Thyroglobulin; Future; Expected date: 12/12/2018  - F/U 3 months with Dr. Jason    The patient was explained the above plan and given opportunity to ask questions. She understands, chooses and consents to this plan and accepts all the risks, which include but are not limited to the risks mentioned above. She understands the alternative of having no testing, interventions or treatments at this time. She left content and without further questions.

## 2018-12-12 NOTE — PROGRESS NOTES
Reason for visit: Right breast carcinoma  Date of Diagnosis: January 12, 2017    HPI:   The patient is a 47-year-old  female who presents to the hematology oncology clinic today for follow up for metastatic right breast carcinoma.    I have reviewed all of the patient's relevant clinical history available in the medical record and have utilized this in my evaluation and management recommendations today including the details of her recent visit to [facility] [place] [facility] on September 15, 2018.  The patient had 2 separate areas in the right breast that were biopsied area the first was a right breast mass at 11:00 which was 3 cm from the nipple which showed invasive mammary carcinoma.  The invasive carcinoma measured at least 1.2 cm in greatest dimension and appeared high-grade.  This tumor was ER positive/ME positive/HER-2 negative.  The second breast mass was biopsied from the retroperitoneal area lower area and was also positive for invasive mammary carcinoma.  This measured at least 0.4 cm and also appeared high-grade.  The tumor is morphologically very similar to the tumor in the prior specimen.  Receptor studies were not done on this specimen.  The patient also had a palpable right axillary lymph node which was biopsied and showed metastatic mammary carcinoma which measured at least 1.3 cm in greatest dimension.  The patient had self palpated this breast mass.  In addition the patient has a palpable mass in the region of her right elbow which she reports has been present for 9 years but had been slowly growing especially over the past year or 2.  She denies any pain associated with this.    She is also status post total thyroidectomy for papillary thyroid carcinoma.  Today the patient reports that she continues to have severe problems with sinus congestion and headache. She has an associated productive cough. She was evaluated in the ER on 12/8/18. She reports chronic low  back pain.   She has been having chronic problems with anxiety and depression.  However she denies any suicidal or homicidal ideation.  She reports chronic fatigue. She is taking norco PRN neoplasm related pain.  She denies any fevers or chills. Denies night sweats.  She denies any melena, hematochezia, hematemesis, hemoptysis or hematuria. She denies cough.  She continues on ibrance.  She continues on letrozole. Ibrance was started on 2/21/17.    PAST MEDICAL HISTORY:   1. HSIL on pap smear s/p LEEP  2. Retinal hemorrhage in left eye    SURGICAL HISTORY:   1.  Tonsillectomy and adenoidectomy  2.  Appendectomy  3.  Cholecystectomy  4.  D&C  5.  Thyroidectomy on August 31, 2017  6.  Right axillary hydradenitis/cellulitis and left inguinal hydradenitis/cellulitis s/p debridement and skin grafting    FAMILY HISTORY: She reports that her maternal aunt had lung cancer in her 70s.  She used to smoke cigarettes.  Her maternal cousin had lung cancer in her 60s.  She used to smoke cigarettes.  She denies any other immediate family members with cancer or bleeding/clotting disorders.    SOCIAL HISTORY: She reports a 46+ pack year smoking history. She drinks wine occasionally.  She denies any history of recreational drug use.  She is engaged and has 2 sons.  She used to work for [employer].  She lives in [place], [place].     ALLERGIES: [NKDA]    MEDICATIONS: [Medcard has been reviewed and/or reconciled.]    REVIEW OF SYSTEMS:   GENERAL: [No fevers, chills or sweats. Reports fatigue. Denies weight loss. Denies loss of appetite.]  HEENT: [No blurred vision, tinnitus. Reports sinus discharge. Reports sorethroat. Denies dysphagia.]  HEART: [No chest pain, palpitations or shortness of breath.]    LUNGS: [Reports chronic cough. Denies hemoptysis or breathing problems.]  ABDOMEN: [No abdominal pain, nausea, vomiting, diarrhea, constipation or melena.]  GENITOURINARY: [No dysuria, bleeding or malodorous discharge.]  NEURO: [Reports  headache. Denies dizziness or vertigo.]  HEMATOLOGY: [No easy bruising, spontaneous bleeding or blood clots in the past].  MUSCULOSKELETAL: [Reports arthralgias, myalgias and bone pains.]  SKIN: [No rashes or skin lesions.]  PSYCHIATRY: [Reports depression. Reports anxiety.]  Denies suicidal/homicidal ideation.    PHYSICAL EXAMINATION:   VS: Reviewed on nurse's notes.  APPEARANCE: The patient is a well-developed, well-nourished and well-groomed  female who appears in no acute distress.   HEENT: No scleral icterus. Both external auditory canals clear. No oral ulcers, lesions. Throat clear  HEAD: No sinus tenderness.  NECK: Supple. No palpable lymphadenopathy. Thyroid non-tender, no palpable masses  CHEST: Breath sounds clear bilaterally. No rales. No rhonchi. Unlabored respirations.  BREAST: Deferred today.  CARDIOVASCULAR: Normal S1, S2. Normal rate. Regular rhythm.  ABDOMEN: Bowel sounds normal. No tenderness. No abdominal distention. No hepatomegaly. No splenomegaly.  BACK: Palpable tenderness in the lower back.  LYMPHATIC: No palpable supraclavicular lymphadenopathy.   EXTREMITIES: No clubbing, cyanosis. Palpable firm 3 cm mass in the right elbow is noted. Trace edema in bilateral lower extremities.      SKIN: No lesions. No petechiae. No ecchymoses.   NEUROLOGIC: No focal findings. Alert & Oriented x 3. Mood appropriate to affect    LABS:   Reviewed    IMAGING:  Reviewed    IMPRESSION:  1.  Metastatic multifocal infiltrating ductal carcinoma of the right breast with right axillary and bone involvement [ER positive/NC positive/HER-2 negative]  2.  Papillary thyroid carcinoma  3.  Tobacco abuse  4.  Anxiety and depression  5.  Acute sinusitis/URI  6.  Hypokalemia    PLAN:  1.  I had a detailed discussion with the patient previously with regard to the diagnosis, prognosis and treatment options for metastatic multifocal invasive ductal carcinoma of the right breast with right axillary and bone  involvement.  2.  I have discussed that at this time her metastatic malignancy is potentially treatable but not curable.  3.  The patient was previously encouraged to quit smoking.  She expressed understanding.  Prescription for Xanax when necessary anxiety was previously given to the patient after full discussion of sedation precautions.    4. FSH and estradiol levels confirm menopausal status. TSH lab was normal.   5.  MRI of the brain on 1/31/17 to evaluate for any evidence of metastatic disease to complete staging workup was negative for metastatic malignancy to the brain. CT head done in March 2017 in ED was also ok.  6.  At this time I have recommended that she restart ibrance and continue letrozole as well.  7. She has been advised to take calcium and vit d supplementation everyday. She is on xgeva injections for treatment of bone involvement by malignancy. Risks/benefits and common side effects discussed and she expressed understanding and agreement. She has dentures.  I will restart Xgeva today.  8. Continue Lasix PRN for treatment of bilateral lower extremity edema.  Supplemental potassium was given as well.  Advised to use compression stockings and elevate legs.  9.  She will continue follow up with with Dr. Jason with endocrinology for follow up of papillary thyroid carcinoma.    10.  Results of MRI of the thoracic and lumbar spine from 9/21/17 for evaluation of current acute symptoms related to back pain reviewed in detail with pateint. No acute process involving the spinal cord is noted. She does have bone involvement by malignancy in multiple areas. However the PET/CT done on 9/27/17 shows no evidence of new areas of bony or visceral involvement.  Hence there appears to be no evidence of metastatic disease progression.  However since the patient continues to have low back pain and MRI of lumbar spine does show enhancing marrow replacing metastatic lesions are seen in the bilateral iliac bones  posteriorly. She did see radiation oncology and completed palliative XRT to this location which helped significantly with her symptoms.    11.  The patient will continue follow up with Dr. Guerin with clinical psychology for her chronic anxiety and depression.  The patient has been advised to seek immediate medical attention if any suicidal/homicidal ideation.  She expressed understanding.  12. CT scans done in Sep 2018 shows that her metastatic breast cancer continues to be stable/well controlled. I will plan to recheck again in Feb 2019.    13. She will continue potassium supplementation as recommended.  14. She will continue OTC preparations as recommended for supportive care for her acute sinusitis/URI. She knows to call for any worsening/nonresolution. She will hold ibrance until resolution of acute illness and then resume this.    Return to clinic in 1 month with labs to discuss further management and for her next dose of Xgeva.  She knows to call sooner for any new problems or questions.    Richard Delong MD

## 2018-12-13 ENCOUNTER — TELEPHONE (OUTPATIENT)
Dept: HEMATOLOGY/ONCOLOGY | Facility: CLINIC | Age: 48
End: 2018-12-13

## 2018-12-13 NOTE — TELEPHONE ENCOUNTER
----- Message from Richard Delong MD sent at 12/13/2018  7:55 AM CST -----  I need to see her back for follow-up 1 day next week when possible.  She has severe B12 deficiency and folic acid deficiency and we need to discuss treatment.  Thank you.

## 2018-12-14 LAB
THRYOGLOBULIN INTERPRETATION: ABNORMAL
THYROGLOB AB SERPL-ACNC: <1.8 IU/ML
THYROGLOB SERPL-MCNC: 0.7 NG/ML

## 2018-12-20 ENCOUNTER — OFFICE VISIT (OUTPATIENT)
Dept: HEMATOLOGY/ONCOLOGY | Facility: CLINIC | Age: 48
End: 2018-12-20
Payer: MEDICAID

## 2018-12-20 VITALS
TEMPERATURE: 98 F | RESPIRATION RATE: 18 BRPM | SYSTOLIC BLOOD PRESSURE: 120 MMHG | WEIGHT: 218.06 LBS | BODY MASS INDEX: 34.22 KG/M2 | OXYGEN SATURATION: 98 % | DIASTOLIC BLOOD PRESSURE: 84 MMHG | HEIGHT: 67 IN | HEART RATE: 79 BPM

## 2018-12-20 DIAGNOSIS — C77.3 MALIGNANT NEOPLASM METASTATIC TO LYMPH NODE OF AXILLA: ICD-10-CM

## 2018-12-20 DIAGNOSIS — E53.8 FOLATE DEFICIENCY: ICD-10-CM

## 2018-12-20 DIAGNOSIS — Z17.0 MALIGNANT NEOPLASM OF OVERLAPPING SITES OF RIGHT BREAST IN FEMALE, ESTROGEN RECEPTOR POSITIVE: Primary | ICD-10-CM

## 2018-12-20 DIAGNOSIS — C50.811 MALIGNANT NEOPLASM OF OVERLAPPING SITES OF RIGHT BREAST IN FEMALE, ESTROGEN RECEPTOR POSITIVE: Primary | ICD-10-CM

## 2018-12-20 DIAGNOSIS — E53.8 VITAMIN B12 DEFICIENCY: ICD-10-CM

## 2018-12-20 DIAGNOSIS — C79.51 SECONDARY CANCER OF BONE: ICD-10-CM

## 2018-12-20 PROCEDURE — 99214 OFFICE O/P EST MOD 30 MIN: CPT | Mod: PBBFAC | Performed by: INTERNAL MEDICINE

## 2018-12-20 PROCEDURE — 99999 PR PBB SHADOW E&M-EST. PATIENT-LVL IV: CPT | Mod: PBBFAC,,, | Performed by: INTERNAL MEDICINE

## 2018-12-20 PROCEDURE — 99214 OFFICE O/P EST MOD 30 MIN: CPT | Mod: S$PBB,,, | Performed by: INTERNAL MEDICINE

## 2018-12-20 RX ORDER — FOLIC ACID 1 MG/1
1 TABLET ORAL DAILY
Qty: 90 TABLET | Refills: 1 | Status: SHIPPED | OUTPATIENT
Start: 2018-12-20 | End: 2020-02-06 | Stop reason: ALTCHOICE

## 2018-12-20 RX ORDER — LANOLIN ALCOHOL/MO/W.PET/CERES
1000 CREAM (GRAM) TOPICAL DAILY
Qty: 90 TABLET | Refills: 3 | Status: SHIPPED | OUTPATIENT
Start: 2018-12-20 | End: 2023-11-21

## 2018-12-21 NOTE — PROGRESS NOTES
Reason for visit: Right breast carcinoma  Date of Diagnosis: January 12, 2017    HPI:   The patient is a 47-year-old  female who presents to the hematology oncology clinic today for follow up for metastatic right breast carcinoma.    I have reviewed all of the patient's relevant clinical history available in the medical record and have utilized this in my evaluation and management recommendations today including the details of her recent visit to [facility] [place] [facility] on September 15, 2018.  The patient had 2 separate areas in the right breast that were biopsied area the first was a right breast mass at 11:00 which was 3 cm from the nipple which showed invasive mammary carcinoma.  The invasive carcinoma measured at least 1.2 cm in greatest dimension and appeared high-grade.  This tumor was ER positive/FL positive/HER-2 negative.  The second breast mass was biopsied from the retroperitoneal area lower area and was also positive for invasive mammary carcinoma.  This measured at least 0.4 cm and also appeared high-grade.  The tumor is morphologically very similar to the tumor in the prior specimen.  Receptor studies were not done on this specimen.  The patient also had a palpable right axillary lymph node which was biopsied and showed metastatic mammary carcinoma which measured at least 1.3 cm in greatest dimension.  The patient had self palpated this breast mass.  In addition the patient has a palpable mass in the region of her right elbow which she reports has been present for 9 years but had been slowly growing especially over the past year or 2.  She denies any pain associated with this.    She is also status post total thyroidectomy for papillary thyroid carcinoma.  Today the patient reports that her sinus congestion and headache have improved. Cough has improved as well. She reports chronic low  back pain.  She has been having chronic problems with anxiety and depression.  However  she denies any suicidal or homicidal ideation.  She reports chronic fatigue. She is taking norco PRN neoplasm related pain.  She denies any fevers or chills. Denies night sweats.  She denies any melena, hematochezia, hematemesis, hemoptysis or hematuria. She denies cough.  She continues on letrozole. Ibrance was started on 2/21/17. This has been on hold for her acute illness.    PAST MEDICAL HISTORY:   1. HSIL on pap smear s/p LEEP  2. Retinal hemorrhage in left eye    SURGICAL HISTORY:   1.  Tonsillectomy and adenoidectomy  2.  Appendectomy  3.  Cholecystectomy  4.  D&C  5.  Thyroidectomy on August 31, 2017  6.  Right axillary hydradenitis/cellulitis and left inguinal hydradenitis/cellulitis s/p debridement and skin grafting    FAMILY HISTORY: She reports that her maternal aunt had lung cancer in her 70s.  She used to smoke cigarettes.  Her maternal cousin had lung cancer in her 60s.  She used to smoke cigarettes.  She denies any other immediate family members with cancer or bleeding/clotting disorders.    SOCIAL HISTORY: She reports a 46+ pack year smoking history. She drinks wine occasionally.  She denies any history of recreational drug use.  She is engaged and has 2 sons.  She used to work for the [employer].  She lives in [place], [place].     ALLERGIES: [NKDA]    MEDICATIONS: [Medcard has been reviewed and/or reconciled.]    REVIEW OF SYSTEMS:   GENERAL: [No fevers, chills or sweats. Reports fatigue. Denies weight loss. Denies loss of appetite.]  HEENT: [No blurred vision, tinnitus. Reports sinus discharge is improved. Reports sorethroat is improved. Denies dysphagia.]  HEART: [No chest pain, palpitations or shortness of breath.]    LUNGS: [Reports chronic cough. Denies hemoptysis or breathing problems.]  ABDOMEN: [No abdominal pain, nausea, vomiting, diarrhea, constipation or melena.]  GENITOURINARY: [No dysuria, bleeding or malodorous discharge.]  NEURO: [Reports headache. Denies dizziness or  vertigo.]  HEMATOLOGY: [No easy bruising, spontaneous bleeding or blood clots in the past].  MUSCULOSKELETAL: [Reports arthralgias, myalgias and bone pains.]  SKIN: [No rashes or skin lesions.]  PSYCHIATRY: [Reports depression. Reports anxiety.]  Denies suicidal/homicidal ideation.    PHYSICAL EXAMINATION:   VS: Reviewed on nurse's notes.  APPEARANCE: The patient is a well-developed, well-nourished and well-groomed  female who appears in no acute distress.   HEENT: No scleral icterus. Both external auditory canals clear. No oral ulcers, lesions. Throat clear  HEAD: No sinus tenderness.  NECK: Supple. No palpable lymphadenopathy. Thyroid non-tender, no palpable masses  CHEST: Breath sounds clear bilaterally. No rales. No rhonchi. Unlabored respirations.  BREAST: Deferred today.  CARDIOVASCULAR: Normal S1, S2. Normal rate. Regular rhythm.  ABDOMEN: Bowel sounds normal. No tenderness. No abdominal distention. No hepatomegaly. No splenomegaly.  BACK: Palpable tenderness in the lower back.  LYMPHATIC: No palpable supraclavicular lymphadenopathy.   EXTREMITIES: No clubbing, cyanosis. Palpable firm 3 cm mass in the right elbow is noted. Trace edema in bilateral lower extremities.      SKIN: No lesions. No petechiae. No ecchymoses.   NEUROLOGIC: No focal findings. Alert & Oriented x 3. Mood appropriate to affect    LABS:   Reviewed    IMAGING:  Reviewed    IMPRESSION:  1.  Metastatic multifocal infiltrating ductal carcinoma of the right breast with right axillary and bone involvement [ER positive/ND positive/HER-2 negative]  2.  Papillary thyroid carcinoma  3.  Tobacco abuse  4.  Anxiety and depression  5.  Acute sinusitis/URI - improved  6.  Vitamin B12 deficiency  7.  Folic acid deficiency    PLAN:  1.  I had a detailed discussion with the patient previously with regard to the diagnosis, prognosis and treatment options for metastatic multifocal invasive ductal carcinoma of the right breast with right axillary and  bone involvement.  2.  I have discussed that at this time her metastatic malignancy is potentially treatable but not curable.  3.  The patient was previously encouraged to quit smoking.  She expressed understanding.  Prescription for Xanax when necessary anxiety was previously given to the patient after full discussion of sedation precautions.    4. FSH and estradiol levels confirm menopausal status. TSH lab was normal.   5.  MRI of the brain on 1/31/17 to evaluate for any evidence of metastatic disease to complete staging workup was negative for metastatic malignancy to the brain. CT head done in March 2017 in ED was also ok.  6.  At this time I have recommended that she restart ibrance and continue letrozole as well.  7. She has been advised to take calcium and vit d supplementation everyday. She is on xgeva injections for treatment of bone involvement by malignancy. Risks/benefits and common side effects discussed and she expressed understanding and agreement. She has dentures.  I will restart Xgeva today.  8. Continue Lasix PRN for treatment of bilateral lower extremity edema.  Continue supplemental potassium was given as well.  Advised to use compression stockings and elevate legs.  9.  She will continue follow up with with Dr. Jason with endocrinology for follow up of papillary thyroid carcinoma.    10.  Results of MRI of the thoracic and lumbar spine from 9/21/17 for evaluation of current acute symptoms related to back pain reviewed in detail with pateint. No acute process involving the spinal cord is noted. She does have bone involvement by malignancy in multiple areas. However the PET/CT done on 9/27/17 shows no evidence of new areas of bony or visceral involvement.  Hence there appears to be no evidence of metastatic disease progression.  However since the patient continues to have low back pain and MRI of lumbar spine does show enhancing marrow replacing metastatic lesions are seen in the bilateral iliac  bones posteriorly. She did see radiation oncology and completed palliative XRT to this location which helped significantly with her symptoms.    11.  The patient will continue follow up with Dr. Guerin with clinical psychology for her chronic anxiety and depression.  The patient has been advised to seek immediate medical attention if any suicidal/homicidal ideation.  She expressed understanding.  12. CT scans done in Sep 2018 shows that her metastatic breast cancer continues to be stable/well controlled. I will plan to recheck again in Feb 2019.    13. She will continue potassium supplementation as recommended.  14.  We had a detailed discussion today with regard to the various possible etiologies for her vitamin B12 and folate deficiency.  After detailed discussion of risks/benefits she has decided to proceed with supplemental oral vitamin B12 and oral folate. Prescriptions were sent to her requested pharmacy. I have discussed that she does not have an appropriate response to oral B12 we will transition to sections feel she expressed understanding    Return to clinic in 1 month with labs to discuss further management and for her next dose of Xgeva.  She knows to call sooner for any new problems or questions.    Richard Delong MD

## 2019-01-02 ENCOUNTER — TELEPHONE (OUTPATIENT)
Dept: HEMATOLOGY/ONCOLOGY | Facility: CLINIC | Age: 49
End: 2019-01-02

## 2019-01-02 DIAGNOSIS — J01.90 ACUTE SINUSITIS, RECURRENCE NOT SPECIFIED, UNSPECIFIED LOCATION: Primary | ICD-10-CM

## 2019-01-02 RX ORDER — DOXYCYCLINE HYCLATE 100 MG
100 TABLET ORAL EVERY 12 HOURS
Qty: 14 TABLET | Refills: 0 | Status: SHIPPED | OUTPATIENT
Start: 2019-01-02 | End: 2019-01-09

## 2019-01-02 RX ORDER — METHYLPREDNISOLONE 4 MG/1
4 TABLET ORAL DAILY
Qty: 1 PACKAGE | Refills: 0 | Status: SHIPPED | OUTPATIENT
Start: 2019-01-02 | End: 2019-02-21

## 2019-01-02 NOTE — TELEPHONE ENCOUNTER
----- Message from Richard Delong MD sent at 1/2/2019  4:26 PM CST -----  Contact: pt  Please let the patient know that I have sent a prescription for doxycycline antibiotic and Medrol Dosepak to her pharmacy.  If no improvement in the next 3-4 days she needs to let me know.  Thank you.  ----- Message -----  From: Juliet Gale MA  Sent: 1/2/2019   2:54 PM  To: Richard Delong MD    Please advise  ----- Message -----  From: Neeta Bolanos  Sent: 1/2/2019   2:31 PM  To: Baljeet Ramos Staff    States she would like to get something called in for her sinuses. Pt uses     [facility] [address] [place] [id number]  Phone: [phone] Fax: [phone]    Please call pt at [phone]. Thank you

## 2019-01-02 NOTE — TELEPHONE ENCOUNTER
Spoke with patient, let her know that prescriptions have been sent to [facility] pharmacy.

## 2019-01-04 DIAGNOSIS — F41.9 ANXIETY: ICD-10-CM

## 2019-01-04 RX ORDER — ALPRAZOLAM 0.5 MG/1
0.5 TABLET ORAL 2 TIMES DAILY PRN
Qty: 60 TABLET | Refills: 0 | Status: SHIPPED | OUTPATIENT
Start: 2019-01-04 | End: 2019-02-04 | Stop reason: SDUPTHER

## 2019-01-11 DIAGNOSIS — C79.51 SECONDARY CANCER OF BONE: ICD-10-CM

## 2019-01-11 DIAGNOSIS — C77.3 MALIGNANT NEOPLASM METASTATIC TO LYMPH NODE OF AXILLA: ICD-10-CM

## 2019-01-11 DIAGNOSIS — G89.3 NEOPLASM RELATED PAIN: ICD-10-CM

## 2019-01-11 DIAGNOSIS — Z17.0 MALIGNANT NEOPLASM OF OVERLAPPING SITES OF RIGHT BREAST IN FEMALE, ESTROGEN RECEPTOR POSITIVE: ICD-10-CM

## 2019-01-11 DIAGNOSIS — C50.811 MALIGNANT NEOPLASM OF OVERLAPPING SITES OF RIGHT BREAST IN FEMALE, ESTROGEN RECEPTOR POSITIVE: ICD-10-CM

## 2019-01-11 RX ORDER — HYDROCODONE BITARTRATE AND ACETAMINOPHEN 7.5; 325 MG/1; MG/1
1 TABLET ORAL EVERY 6 HOURS PRN
Qty: 120 TABLET | Refills: 0 | Status: SHIPPED | OUTPATIENT
Start: 2019-01-11 | End: 2019-02-11 | Stop reason: SDUPTHER

## 2019-01-11 RX ORDER — PALBOCICLIB 125 MG/1
CAPSULE ORAL
Qty: 21 CAPSULE | Refills: 2 | Status: SHIPPED | OUTPATIENT
Start: 2019-01-11 | End: 2019-02-27 | Stop reason: SDUPTHER

## 2019-01-14 DIAGNOSIS — C50.811 MALIGNANT NEOPLASM OF OVERLAPPING SITES OF RIGHT BREAST IN FEMALE, ESTROGEN RECEPTOR POSITIVE: ICD-10-CM

## 2019-01-14 DIAGNOSIS — C77.3 MALIGNANT NEOPLASM METASTATIC TO LYMPH NODE OF AXILLA: ICD-10-CM

## 2019-01-14 DIAGNOSIS — F32.A DEPRESSION, UNSPECIFIED DEPRESSION TYPE: ICD-10-CM

## 2019-01-14 DIAGNOSIS — G89.3 NEOPLASM RELATED PAIN: ICD-10-CM

## 2019-01-14 DIAGNOSIS — C79.51 SECONDARY CANCER OF BONE: ICD-10-CM

## 2019-01-14 DIAGNOSIS — Z17.0 MALIGNANT NEOPLASM OF OVERLAPPING SITES OF RIGHT BREAST IN FEMALE, ESTROGEN RECEPTOR POSITIVE: ICD-10-CM

## 2019-01-14 DIAGNOSIS — F41.9 ANXIETY: ICD-10-CM

## 2019-01-15 RX ORDER — LETROZOLE 2.5 MG/1
TABLET, FILM COATED ORAL
Qty: 90 TABLET | Refills: 1 | Status: SHIPPED | OUTPATIENT
Start: 2019-01-15 | End: 2019-06-29 | Stop reason: SDUPTHER

## 2019-01-18 ENCOUNTER — LAB VISIT (OUTPATIENT)
Dept: LAB | Facility: HOSPITAL | Age: 49
End: 2019-01-18
Attending: INTERNAL MEDICINE
Payer: MEDICAID

## 2019-01-18 ENCOUNTER — OFFICE VISIT (OUTPATIENT)
Dept: HEMATOLOGY/ONCOLOGY | Facility: CLINIC | Age: 49
End: 2019-01-18
Payer: MEDICAID

## 2019-01-18 ENCOUNTER — INFUSION (OUTPATIENT)
Dept: INFUSION THERAPY | Facility: HOSPITAL | Age: 49
End: 2019-01-18
Attending: INTERNAL MEDICINE
Payer: MEDICAID

## 2019-01-18 VITALS
TEMPERATURE: 97 F | BODY MASS INDEX: 34.01 KG/M2 | HEIGHT: 67 IN | OXYGEN SATURATION: 97 % | HEART RATE: 92 BPM | WEIGHT: 216.69 LBS | SYSTOLIC BLOOD PRESSURE: 110 MMHG | DIASTOLIC BLOOD PRESSURE: 66 MMHG

## 2019-01-18 DIAGNOSIS — E53.8 FOLATE DEFICIENCY: ICD-10-CM

## 2019-01-18 DIAGNOSIS — E53.8 VITAMIN B12 DEFICIENCY: ICD-10-CM

## 2019-01-18 DIAGNOSIS — C79.51 SECONDARY CANCER OF BONE: ICD-10-CM

## 2019-01-18 DIAGNOSIS — G89.3 NEOPLASM RELATED PAIN: ICD-10-CM

## 2019-01-18 DIAGNOSIS — C77.3 MALIGNANT NEOPLASM METASTATIC TO LYMPH NODE OF AXILLA: ICD-10-CM

## 2019-01-18 DIAGNOSIS — C50.811 MALIGNANT NEOPLASM OF OVERLAPPING SITES OF RIGHT BREAST IN FEMALE, ESTROGEN RECEPTOR POSITIVE: ICD-10-CM

## 2019-01-18 DIAGNOSIS — Z17.0 MALIGNANT NEOPLASM OF OVERLAPPING SITES OF RIGHT BREAST IN FEMALE, ESTROGEN RECEPTOR POSITIVE: ICD-10-CM

## 2019-01-18 DIAGNOSIS — C79.51 SECONDARY CANCER OF BONE: Primary | ICD-10-CM

## 2019-01-18 DIAGNOSIS — Z17.0 MALIGNANT NEOPLASM OF OVERLAPPING SITES OF RIGHT BREAST IN FEMALE, ESTROGEN RECEPTOR POSITIVE: Primary | ICD-10-CM

## 2019-01-18 DIAGNOSIS — C50.811 MALIGNANT NEOPLASM OF OVERLAPPING SITES OF RIGHT BREAST IN FEMALE, ESTROGEN RECEPTOR POSITIVE: Primary | ICD-10-CM

## 2019-01-18 LAB
ALBUMIN SERPL BCP-MCNC: 3.8 G/DL
ALP SERPL-CCNC: 62 U/L
ALT SERPL W/O P-5'-P-CCNC: 25 U/L
ANION GAP SERPL CALC-SCNC: 6 MMOL/L
AST SERPL-CCNC: 18 U/L
BASOPHILS # BLD AUTO: 0.04 K/UL
BASOPHILS NFR BLD: 1.2 %
BILIRUB SERPL-MCNC: 0.5 MG/DL
BUN SERPL-MCNC: 7 MG/DL
CALCIUM SERPL-MCNC: 9 MG/DL
CHLORIDE SERPL-SCNC: 106 MMOL/L
CO2 SERPL-SCNC: 26 MMOL/L
CREAT SERPL-MCNC: 1.1 MG/DL
DIFFERENTIAL METHOD: ABNORMAL
EOSINOPHIL # BLD AUTO: 0.1 K/UL
EOSINOPHIL NFR BLD: 1.5 %
ERYTHROCYTE [DISTWIDTH] IN BLOOD BY AUTOMATED COUNT: 13.9 %
EST. GFR  (AFRICAN AMERICAN): >60 ML/MIN/1.73 M^2
EST. GFR  (NON AFRICAN AMERICAN): 59 ML/MIN/1.73 M^2
GLUCOSE SERPL-MCNC: 94 MG/DL
HCT VFR BLD AUTO: 38.4 %
HGB BLD-MCNC: 13.3 G/DL
LYMPHOCYTES # BLD AUTO: 1.2 K/UL
LYMPHOCYTES NFR BLD: 36 %
MCH RBC QN AUTO: 38.1 PG
MCHC RBC AUTO-ENTMCNC: 34.6 G/DL
MCV RBC AUTO: 110 FL
MONOCYTES # BLD AUTO: 0.2 K/UL
MONOCYTES NFR BLD: 6.6 %
NEUTROPHILS # BLD AUTO: 1.8 K/UL
NEUTROPHILS NFR BLD: 54.7 %
PLATELET # BLD AUTO: 252 K/UL
PMV BLD AUTO: 10.1 FL
POTASSIUM SERPL-SCNC: 4.1 MMOL/L
PROT SERPL-MCNC: 7.3 G/DL
RBC # BLD AUTO: 3.49 M/UL
SODIUM SERPL-SCNC: 138 MMOL/L
VIT B12 SERPL-MCNC: 394 PG/ML
WBC # BLD AUTO: 3.31 K/UL

## 2019-01-18 PROCEDURE — 99214 OFFICE O/P EST MOD 30 MIN: CPT | Mod: PBBFAC,25 | Performed by: INTERNAL MEDICINE

## 2019-01-18 PROCEDURE — 82607 VITAMIN B-12: CPT

## 2019-01-18 PROCEDURE — 96372 THER/PROPH/DIAG INJ SC/IM: CPT

## 2019-01-18 PROCEDURE — 99215 OFFICE O/P EST HI 40 MIN: CPT | Mod: 25,S$PBB,, | Performed by: INTERNAL MEDICINE

## 2019-01-18 PROCEDURE — 80053 COMPREHEN METABOLIC PANEL: CPT

## 2019-01-18 PROCEDURE — 85025 COMPLETE CBC W/AUTO DIFF WBC: CPT

## 2019-01-18 PROCEDURE — 36415 COLL VENOUS BLD VENIPUNCTURE: CPT

## 2019-01-18 PROCEDURE — 63600175 PHARM REV CODE 636 W HCPCS: Mod: JG | Performed by: INTERNAL MEDICINE

## 2019-01-18 PROCEDURE — 82746 ASSAY OF FOLIC ACID SERUM: CPT

## 2019-01-18 PROCEDURE — 99999 PR PBB SHADOW E&M-EST. PATIENT-LVL IV: CPT | Mod: PBBFAC,,, | Performed by: INTERNAL MEDICINE

## 2019-01-18 PROCEDURE — 99215 PR OFFICE/OUTPT VISIT, EST, LEVL V, 40-54 MIN: ICD-10-PCS | Mod: 25,S$PBB,, | Performed by: INTERNAL MEDICINE

## 2019-01-18 PROCEDURE — 99999 PR PBB SHADOW E&M-EST. PATIENT-LVL IV: ICD-10-PCS | Mod: PBBFAC,,, | Performed by: INTERNAL MEDICINE

## 2019-01-18 RX ADMIN — DENOSUMAB 120 MG: 120 INJECTION SUBCUTANEOUS at 02:01

## 2019-01-18 NOTE — DISCHARGE INSTRUCTIONS
[facility]  [address]  [phone] phone     [phone] fax  Hours of Operation: Monday- Friday 8:00am- 5:00pm  After hours phone  [phone]  Hematology / Oncology Physicians on call      Dr. Jake Keith                        Please call with any concerns regarding your appointment today.

## 2019-01-18 NOTE — PROGRESS NOTES
Reason for visit: Right breast carcinoma  Date of Diagnosis: January 12, 2017    HPI:   The patient is a 48-year-old  female who presents to the hematology oncology clinic today for follow up for metastatic right breast carcinoma.    I have reviewed all of the patient's relevant clinical history available in the medical record and have utilized this in my evaluation and management recommendations today including the details of her recent visit to [facility] [place] [facility] on September 15, 2018.  The patient had 2 separate areas in the right breast that were biopsied area the first was a right breast mass at 11:00 which was 3 cm from the nipple which showed invasive mammary carcinoma.  The invasive carcinoma measured at least 1.2 cm in greatest dimension and appeared high-grade.  This tumor was ER positive/NC positive/HER-2 negative.  The second breast mass was biopsied from the retroperitoneal area lower area and was also positive for invasive mammary carcinoma.  This measured at least 0.4 cm and also appeared high-grade.  The tumor is morphologically very similar to the tumor in the prior specimen.  Receptor studies were not done on this specimen.  The patient also had a palpable right axillary lymph node which was biopsied and showed metastatic mammary carcinoma which measured at least 1.3 cm in greatest dimension.  The patient had self palpated this breast mass.  In addition the patient has a palpable mass in the region of her right elbow which she reports has been present for 9 years but had been slowly growing especially over the past year or 2.  She denies any pain associated with this.    She is also status post total thyroidectomy for papillary thyroid carcinoma.  Today the patient reports that overall she feels well. She reports chronic low  back pain.  She has been having chronic problems with anxiety and depression.  However she denies any suicidal or homicidal ideation.  She  reports chronic fatigue. She is taking norco PRN neoplasm related pain.  She denies any fevers or chills. Denies night sweats.  She denies any melena, hematochezia, hematemesis, hemoptysis or hematuria. She denies cough.  She continues on letrozole. Ibrance was started on 2/21/17.     PAST MEDICAL HISTORY:   1. HSIL on pap smear s/p LEEP  2. Retinal hemorrhage in left eye    SURGICAL HISTORY:   1.  Tonsillectomy and adenoidectomy  2.  Appendectomy  3.  Cholecystectomy  4.  D&C  5.  Thyroidectomy on August 31, 2017  6.  Right axillary hydradenitis/cellulitis and left inguinal hydradenitis/cellulitis s/p debridement and skin grafting    FAMILY HISTORY: She reports that her maternal aunt had lung cancer in her 70s.  She used to smoke cigarettes.  Her maternal cousin had lung cancer in her 60s.  She used to smoke cigarettes.  She denies any other immediate family members with cancer or bleeding/clotting disorders.    SOCIAL HISTORY: She reports a 46+ pack year smoking history. She drinks wine occasionally.  She denies any history of recreational drug use.  She is engaged and has 2 sons.  She used to work for the [employer].  She lives in [place], [place].     ALLERGIES: [NKDA]    MEDICATIONS: [Medcard has been reviewed and/or reconciled.]    REVIEW OF SYSTEMS:   GENERAL: [No fevers, chills or sweats. Reports fatigue. Denies weight loss. Denies loss of appetite.]  HEENT: [No blurred vision, tinnitus. Reports sinus discharge is improved. Reports sorethroat is improved. Denies dysphagia.]  HEART: [No chest pain, palpitations or shortness of breath.]    LUNGS: [Reports chronic cough. Denies hemoptysis or breathing problems.]  ABDOMEN: [No abdominal pain, nausea, vomiting, diarrhea, constipation or melena.]  GENITOURINARY: [No dysuria, bleeding or malodorous discharge.]  NEURO: [Reports headache. Denies dizziness or vertigo.]  HEMATOLOGY: [No easy bruising, spontaneous bleeding or blood clots in the  past].  MUSCULOSKELETAL: [Reports arthralgias, myalgias and bone pains.]  SKIN: [No rashes or skin lesions.]  PSYCHIATRY: [Reports depression. Reports anxiety.]  Denies suicidal/homicidal ideation.    PHYSICAL EXAMINATION:   VS: Reviewed on nurse's notes.  APPEARANCE: The patient is a well-developed, well-nourished and well-groomed  female who appears in no acute distress.   HEENT: No scleral icterus. Both external auditory canals clear. No oral ulcers, lesions. Throat clear  HEAD: No sinus tenderness.  NECK: Supple. No palpable lymphadenopathy. Thyroid non-tender, no palpable masses  CHEST: Breath sounds clear bilaterally. No rales. No rhonchi. Unlabored respirations.  BREAST: Deferred today.  CARDIOVASCULAR: Normal S1, S2. Normal rate. Regular rhythm.  ABDOMEN: Bowel sounds normal. No tenderness. No abdominal distention. No hepatomegaly. No splenomegaly.  BACK: Palpable tenderness in the lower back.  LYMPHATIC: No palpable supraclavicular lymphadenopathy.   EXTREMITIES: No clubbing, cyanosis. Palpable firm 3 cm mass in the right elbow is noted. Trace edema in bilateral lower extremities.      SKIN: No lesions. No petechiae. No ecchymoses.   NEUROLOGIC: No focal findings. Alert & Oriented x 3. Mood appropriate to affect    LABS:   Reviewed    IMAGING:  Reviewed    IMPRESSION:  1.  Metastatic multifocal infiltrating ductal carcinoma of the right breast with right axillary and bone involvement [ER positive/HI positive/HER-2 negative]  2.  Papillary thyroid carcinoma  3.  Tobacco abuse  4.  Anxiety and depression  5.  Vitamin B12 deficiency  6.  Folic acid deficiency    PLAN:  1.  I had a detailed discussion with the patient previously with regard to the diagnosis, prognosis and treatment options for metastatic multifocal invasive ductal carcinoma of the right breast with right axillary and bone involvement.  2.  I have discussed that at this time her metastatic malignancy is potentially treatable but not  curable.  3.  The patient was previously encouraged to quit smoking.  She expressed understanding.  Prescription for Xanax when necessary anxiety was previously given to the patient after full discussion of sedation precautions.    4. FSH and estradiol levels confirm menopausal status. TSH lab was normal.   5.  MRI of the brain on 1/31/17 to evaluate for any evidence of metastatic disease to complete staging workup was negative for metastatic malignancy to the brain. CT head done in March 2017 in ED was also ok.  6.  At this time I have recommended that she restart ibrance and continue letrozole as well.  7. She has been advised to take calcium and vit d supplementation everyday. She is on xgeva injections for treatment of bone involvement by malignancy. Risks/benefits and common side effects discussed and she expressed understanding and agreement. She has dentures.  I will restart Xgeva today.  8. Continue Lasix PRN for treatment of bilateral lower extremity edema.  Continue supplemental potassium was given as well.  Advised to use compression stockings and elevate legs.  9.  She will continue follow up with with Dr. Jason with endocrinology for follow up of papillary thyroid carcinoma.    10.  Results of MRI of the thoracic and lumbar spine from 9/21/17 for evaluation of current acute symptoms related to back pain reviewed in detail with pateint. No acute process involving the spinal cord is noted. She does have bone involvement by malignancy in multiple areas. However the PET/CT done on 9/27/17 shows no evidence of new areas of bony or visceral involvement.  Hence there appears to be no evidence of metastatic disease progression.  However since the patient continues to have low back pain and MRI of lumbar spine does show enhancing marrow replacing metastatic lesions are seen in the bilateral iliac bones posteriorly. She did see radiation oncology and completed palliative XRT to this location which helped  significantly with her symptoms.    11.  The patient will continue follow up with Dr. Guerin with clinical psychology for her chronic anxiety and depression.  The patient has been advised to seek immediate medical attention if any suicidal/homicidal ideation.  She expressed understanding.  12. CT scans done in Sep 2018 shows that her metastatic breast cancer continues to be stable/well controlled. I will plan to recheck again in Feb 2019.    13. She will continue potassium supplementation as recommended.  14.  We had a detailed discussion previously with regard to the various possible etiologies for her vitamin B12 and folate deficiency.  After detailed discussion of risks/benefits she has decided to proceed with supplemental oral vitamin B12 and oral folate. Prescriptions were previously sent to her requested pharmacy. I have discussed that she does not have an appropriate response to oral B12 we will transition to sections feel she expressed understanding. I will follow up with pending labs.    Return to clinic in 1 month with labs, CT scans to discuss further management and for her next dose of Xgeva.  She knows to call sooner for any new problems or questions.    Richard Delong MD

## 2019-01-19 LAB — FOLATE SERPL-MCNC: >40 NG/ML

## 2019-02-04 DIAGNOSIS — F41.9 ANXIETY: ICD-10-CM

## 2019-02-04 RX ORDER — ALPRAZOLAM 0.5 MG/1
0.5 TABLET ORAL 2 TIMES DAILY PRN
Qty: 60 TABLET | Refills: 0 | Status: SHIPPED | OUTPATIENT
Start: 2019-02-04 | End: 2019-03-05 | Stop reason: SDUPTHER

## 2019-02-11 DIAGNOSIS — G89.3 NEOPLASM RELATED PAIN: ICD-10-CM

## 2019-02-11 DIAGNOSIS — C77.3 MALIGNANT NEOPLASM METASTATIC TO LYMPH NODE OF AXILLA: ICD-10-CM

## 2019-02-11 DIAGNOSIS — C50.811 MALIGNANT NEOPLASM OF OVERLAPPING SITES OF RIGHT BREAST IN FEMALE, ESTROGEN RECEPTOR POSITIVE: ICD-10-CM

## 2019-02-11 DIAGNOSIS — Z17.0 MALIGNANT NEOPLASM OF OVERLAPPING SITES OF RIGHT BREAST IN FEMALE, ESTROGEN RECEPTOR POSITIVE: ICD-10-CM

## 2019-02-11 DIAGNOSIS — C79.51 SECONDARY CANCER OF BONE: ICD-10-CM

## 2019-02-11 RX ORDER — HYDROCODONE BITARTRATE AND ACETAMINOPHEN 7.5; 325 MG/1; MG/1
1 TABLET ORAL EVERY 6 HOURS PRN
Qty: 120 TABLET | Refills: 0 | Status: SHIPPED | OUTPATIENT
Start: 2019-02-11 | End: 2019-03-11 | Stop reason: SDUPTHER

## 2019-02-15 ENCOUNTER — TELEPHONE (OUTPATIENT)
Dept: RADIOLOGY | Facility: HOSPITAL | Age: 49
End: 2019-02-15

## 2019-02-18 ENCOUNTER — HOSPITAL ENCOUNTER (OUTPATIENT)
Dept: RADIOLOGY | Facility: HOSPITAL | Age: 49
Discharge: HOME | End: 2019-02-18
Attending: INTERNAL MEDICINE
Payer: MEDICAID

## 2019-02-18 DIAGNOSIS — C50.811 MALIGNANT NEOPLASM OF OVERLAPPING SITES OF RIGHT BREAST IN FEMALE, ESTROGEN RECEPTOR POSITIVE: ICD-10-CM

## 2019-02-18 DIAGNOSIS — C79.51 SECONDARY CANCER OF BONE: ICD-10-CM

## 2019-02-18 DIAGNOSIS — Z17.0 MALIGNANT NEOPLASM OF OVERLAPPING SITES OF RIGHT BREAST IN FEMALE, ESTROGEN RECEPTOR POSITIVE: ICD-10-CM

## 2019-02-18 DIAGNOSIS — C77.3 MALIGNANT NEOPLASM METASTATIC TO LYMPH NODE OF AXILLA: ICD-10-CM

## 2019-02-18 DIAGNOSIS — G89.3 NEOPLASM RELATED PAIN: ICD-10-CM

## 2019-02-18 PROCEDURE — 74177 CT ABD & PELVIS W/CONTRAST: CPT | Mod: TC

## 2019-02-18 PROCEDURE — 74177 CT ABD & PELVIS W/CONTRAST: CPT | Mod: 26,,, | Performed by: RADIOLOGY

## 2019-02-18 PROCEDURE — 74177 CT CHEST ABDOMEN PELVIS WITH CONTRAST (XPD): ICD-10-PCS | Mod: 26,,, | Performed by: RADIOLOGY

## 2019-02-18 PROCEDURE — 71260 CT CHEST ABDOMEN PELVIS WITH CONTRAST (XPD): ICD-10-PCS | Mod: 26,,, | Performed by: RADIOLOGY

## 2019-02-18 PROCEDURE — 71260 CT THORAX DX C+: CPT | Mod: 26,,, | Performed by: RADIOLOGY

## 2019-02-21 ENCOUNTER — OFFICE VISIT (OUTPATIENT)
Dept: HEMATOLOGY/ONCOLOGY | Facility: CLINIC | Age: 49
End: 2019-02-21
Payer: MEDICAID

## 2019-02-21 ENCOUNTER — INFUSION (OUTPATIENT)
Dept: INFUSION THERAPY | Facility: HOSPITAL | Age: 49
End: 2019-02-21
Attending: INTERNAL MEDICINE
Payer: MEDICAID

## 2019-02-21 VITALS
TEMPERATURE: 98 F | HEIGHT: 67 IN | SYSTOLIC BLOOD PRESSURE: 119 MMHG | DIASTOLIC BLOOD PRESSURE: 70 MMHG | WEIGHT: 221.56 LBS | OXYGEN SATURATION: 97 % | HEART RATE: 85 BPM | BODY MASS INDEX: 34.78 KG/M2

## 2019-02-21 DIAGNOSIS — C77.3 MALIGNANT NEOPLASM METASTATIC TO LYMPH NODE OF AXILLA: ICD-10-CM

## 2019-02-21 DIAGNOSIS — G89.3 NEOPLASM RELATED PAIN: ICD-10-CM

## 2019-02-21 DIAGNOSIS — C50.811 MALIGNANT NEOPLASM OF OVERLAPPING SITES OF RIGHT FEMALE BREAST: ICD-10-CM

## 2019-02-21 DIAGNOSIS — C79.51 SECONDARY CANCER OF BONE: ICD-10-CM

## 2019-02-21 DIAGNOSIS — Z17.0 MALIGNANT NEOPLASM OF OVERLAPPING SITES OF RIGHT BREAST IN FEMALE, ESTROGEN RECEPTOR POSITIVE: ICD-10-CM

## 2019-02-21 DIAGNOSIS — F32.A DEPRESSION, UNSPECIFIED DEPRESSION TYPE: ICD-10-CM

## 2019-02-21 DIAGNOSIS — C50.811 MALIGNANT NEOPLASM OF OVERLAPPING SITES OF RIGHT BREAST IN FEMALE, ESTROGEN RECEPTOR POSITIVE: ICD-10-CM

## 2019-02-21 DIAGNOSIS — F41.9 ANXIETY: ICD-10-CM

## 2019-02-21 DIAGNOSIS — Z17.0 MALIGNANT NEOPLASM OF OVERLAPPING SITES OF RIGHT BREAST IN FEMALE, ESTROGEN RECEPTOR POSITIVE: Primary | ICD-10-CM

## 2019-02-21 DIAGNOSIS — C50.811 MALIGNANT NEOPLASM OF OVERLAPPING SITES OF RIGHT BREAST IN FEMALE, ESTROGEN RECEPTOR POSITIVE: Primary | ICD-10-CM

## 2019-02-21 DIAGNOSIS — C79.51 SECONDARY CANCER OF BONE: Primary | ICD-10-CM

## 2019-02-21 DIAGNOSIS — L50.9 URTICARIA: ICD-10-CM

## 2019-02-21 PROCEDURE — 99999 PR PBB SHADOW E&M-EST. PATIENT-LVL IV: CPT | Mod: PBBFAC,,, | Performed by: INTERNAL MEDICINE

## 2019-02-21 PROCEDURE — 99214 OFFICE O/P EST MOD 30 MIN: CPT | Mod: PBBFAC,PN,25 | Performed by: INTERNAL MEDICINE

## 2019-02-21 PROCEDURE — 99999 PR PBB SHADOW E&M-EST. PATIENT-LVL IV: ICD-10-PCS | Mod: PBBFAC,,, | Performed by: INTERNAL MEDICINE

## 2019-02-21 PROCEDURE — 99215 PR OFFICE/OUTPT VISIT, EST, LEVL V, 40-54 MIN: ICD-10-PCS | Mod: 25,S$PBB,, | Performed by: INTERNAL MEDICINE

## 2019-02-21 PROCEDURE — 63600175 PHARM REV CODE 636 W HCPCS: Mod: JG | Performed by: INTERNAL MEDICINE

## 2019-02-21 PROCEDURE — 96372 THER/PROPH/DIAG INJ SC/IM: CPT

## 2019-02-21 PROCEDURE — 99215 OFFICE O/P EST HI 40 MIN: CPT | Mod: 25,S$PBB,, | Performed by: INTERNAL MEDICINE

## 2019-02-21 RX ORDER — CITALOPRAM 20 MG/1
20 TABLET, FILM COATED ORAL NIGHTLY
Qty: 90 TABLET | Refills: 1 | Status: SHIPPED | OUTPATIENT
Start: 2019-02-21 | End: 2019-09-26

## 2019-02-21 RX ADMIN — DENOSUMAB 120 MG: 120 INJECTION SUBCUTANEOUS at 12:02

## 2019-02-21 NOTE — PROGRESS NOTES
Distress Screening Results: Psychosocial Distress screening score of Distress Score: 8 noted and reviewed. A referral to onc Oncology Social Worker initiated.

## 2019-02-21 NOTE — PROGRESS NOTES
Reason for visit: Right breast carcinoma  Date of Diagnosis: January 12, 2017    HPI:   The patient is a 48-year-old  female who presents to the hematology oncology clinic today for follow up for metastatic right breast carcinoma.    I have reviewed all of the patient's relevant clinical history available in the medical record and have utilized this in my evaluation and management recommendations today including the details of her recent visit to [facility] [place] [facility] on September 15, 2018.  The patient had 2 separate areas in the right breast that were biopsied area the first was a right breast mass at 11:00 which was 3 cm from the nipple which showed invasive mammary carcinoma.  The invasive carcinoma measured at least 1.2 cm in greatest dimension and appeared high-grade.  This tumor was ER positive/ME positive/HER-2 negative.  The second breast mass was biopsied from the retroperitoneal area lower area and was also positive for invasive mammary carcinoma.  This measured at least 0.4 cm and also appeared high-grade.  The tumor is morphologically very similar to the tumor in the prior specimen.  Receptor studies were not done on this specimen.  The patient also had a palpable right axillary lymph node which was biopsied and showed metastatic mammary carcinoma which measured at least 1.3 cm in greatest dimension.  The patient had self palpated this breast mass.  In addition the patient has a palpable mass in the region of her right elbow which she reports has been present for 9 years but had been slowly growing especially over the past year or 2.  She denies any pain associated with this.    She is also status post total thyroidectomy for papillary thyroid carcinoma.  Today the patient reports that she has been having a recurrent rash on her face.  This has improved with 2 courses of p.o. methylprednisolone as an outpatient but seems to be a recurrent issue in the past couple of  months.  She also reports feeling tired and fatigued.  She reports having trouble sleeping at night.  She reports chronic low  back pain.  She has been having chronic problems with anxiety and depression.  However she denies any suicidal or homicidal ideation.  She is taking norco PRN neoplasm related pain.  She denies any fevers or chills. Denies night sweats.  She denies any melena, hematochezia, hematemesis, hemoptysis or hematuria. She denies cough.  She continues on letrozole. Ibrance was started on 2/21/17.     PAST MEDICAL HISTORY:   1. HSIL on pap smear s/p LEEP  2. Retinal hemorrhage in left eye    SURGICAL HISTORY:   1.  Tonsillectomy and adenoidectomy  2.  Appendectomy  3.  Cholecystectomy  4.  D&C  5.  Thyroidectomy on August 31, 2017  6.  Right axillary hydradenitis/cellulitis and left inguinal hydradenitis/cellulitis s/p debridement and skin grafting    FAMILY HISTORY: She reports that her maternal aunt had lung cancer in her 70s.  She used to smoke cigarettes.  Her maternal cousin had lung cancer in her 60s.  She used to smoke cigarettes.  She denies any other immediate family members with cancer or bleeding/clotting disorders.    SOCIAL HISTORY: She reports a 46+ pack year smoking history. She drinks wine occasionally.  She denies any history of recreational drug use.  She is engaged and has 2 sons.  She used to work for the [employer].  She lives in [place], [place].     ALLERGIES: [NKDA]    MEDICATIONS: [Medcard has been reviewed and/or reconciled.]    REVIEW OF SYSTEMS:   GENERAL: [No fevers, chills or sweats. Reports fatigue. Denies weight loss. Denies loss of appetite.]  HEENT: [No blurred vision, tinnitus. Reports sinus discharge is improved. Reports sorethroat is improved. Denies dysphagia.]  HEART: [No chest pain, palpitations or shortness of breath.]    LUNGS: [Reports chronic cough. Denies hemoptysis or breathing problems.]  ABDOMEN: [No abdominal pain, nausea, vomiting, diarrhea,  constipation or melena.]  GENITOURINARY: [No dysuria, bleeding or malodorous discharge.]  NEURO: [Reports headache. Denies dizziness or vertigo.]  HEMATOLOGY: [No easy bruising, spontaneous bleeding or blood clots in the past].  MUSCULOSKELETAL: [Reports arthralgias, myalgias and bone pains.]  SKIN: [Reports rash on face.]  PSYCHIATRY: [Reports depression. Reports anxiety.]  Denies suicidal/homicidal ideation.    PHYSICAL EXAMINATION:   VS: Reviewed on nurse's notes.  APPEARANCE: The patient is a well-developed, well-nourished and well-groomed  female who appears in no acute distress.   HEENT: No scleral icterus. Both external auditory canals clear. No oral ulcers, lesions. Throat clear  HEAD: No sinus tenderness.  NECK: Supple. No palpable lymphadenopathy. Thyroid non-tender, no palpable masses  CHEST: Breath sounds clear bilaterally. No rales. No rhonchi. Unlabored respirations.  BREAST: Deferred today.  CARDIOVASCULAR: Normal S1, S2. Normal rate. Regular rhythm.  ABDOMEN: Bowel sounds normal. No tenderness. No abdominal distention. No hepatomegaly. No splenomegaly.  BACK: Palpable tenderness in the lower back.  LYMPHATIC: No palpable supraclavicular lymphadenopathy.   EXTREMITIES: No clubbing, cyanosis. Palpable firm 3 cm mass in the right elbow is noted. Trace edema in bilateral lower extremities.      SKIN:  Mild urticarial skin rash noted on face   NEUROLOGIC: No focal findings. Alert & Oriented x 3. Mood appropriate to affect    LABS:   Reviewed    IMAGING:  Reviewed    IMPRESSION:  1.  Metastatic multifocal infiltrating ductal carcinoma of the right breast with right axillary and bone involvement [ER positive/LA positive/HER-2 negative]  2.  Papillary thyroid carcinoma  3.  Tobacco abuse  4.  Anxiety and depression  5.  Vitamin B12 deficiency-improved  6.  Folic acid deficiency-improved  7.  Urticaria    PLAN:  1.  I had a detailed discussion with the patient previously with regard to the diagnosis,  prognosis and treatment options for metastatic multifocal invasive ductal carcinoma of the right breast with right axillary and bone involvement.  2.  I have discussed that at this time her metastatic malignancy is potentially treatable but not curable.  3.  The patient was previously encouraged to quit smoking.  She expressed understanding.  Prescription for Xanax when necessary anxiety was previously given to the patient after full discussion of sedation precautions.    4. FSH and estradiol levels confirm menopausal status. TSH lab was normal.   5.  MRI of the brain on 1/31/17 to evaluate for any evidence of metastatic disease to complete staging workup was negative for metastatic malignancy to the brain. CT head done in March 2017 in ED was also ok.  6.  At this time I have recommended that she restart ibrance and continue letrozole as well.  7. She has been advised to take calcium and vit d supplementation everyday. She is on xgeva injections for treatment of bone involvement by malignancy. Risks/benefits and common side effects discussed and she expressed understanding and agreement. She has dentures.  I will restart Xgeva today.  8. Continue Lasix PRN for treatment of bilateral lower extremity edema.  Continue supplemental potassium was given as well.  Advised to use compression stockings and elevate legs.  9.  She will continue follow up with with Dr. Jason with endocrinology for follow up of papillary thyroid carcinoma.    10.  Results of MRI of the thoracic and lumbar spine from 9/21/17 for evaluation of current acute symptoms related to back pain reviewed in detail with pateint. No acute process involving the spinal cord is noted. She does have bone involvement by malignancy in multiple areas. However the PET/CT done on 9/27/17 shows no evidence of new areas of bony or visceral involvement.  Hence there appears to be no evidence of metastatic disease progression.  However since the patient continues to  have low back pain and MRI of lumbar spine does show enhancing marrow replacing metastatic lesions are seen in the bilateral iliac bones posteriorly. She did see radiation oncology and completed palliative XRT to this location which helped significantly with her symptoms.    11.  The patient will continue follow up with Dr. Guerin with clinical psychology for her chronic anxiety and depression.  The patient has been advised to seek immediate medical attention if any suicidal/homicidal ideation.  She expressed understanding.  12. CT scans done in Feb 2019 shows that her metastatic breast cancer continues to be stable/well controlled. I will plan to recheck again in June 2019.    13. She will continue potassium supplementation as recommended.  14.  She will complete p.o. folic acid after current prescription runs out as most recent folate level is normal. She will continue oral vitamin B12 supplementation for the rest of her life.  The rationale for this was reviewed in detail and she expressed understanding.  15.  Refill for Celexa was given to the patient today for treatment of her depression.  We discussed that this might also help her insomnia as her persistent insomnia might also be related to her depression.  She expressed understanding.  She will let me know if no improvement.  16.  We had a detailed discussion about the various possible etiologies for her urticarial skin rash on her face.  Etiology is unclear.  She denies any new household products and there have not been any new medications in the recent past.  I have recommended dermatology consultation and allergy consultation for further evaluation management recommendations with regard to this.  In the meantime I have recommended that she continue Benadryl p.r.n..  She will let me know if any worsening in the interim.    Return to clinic in 1 month with labs for her next dose of Xgeva.  She knows to call sooner for any new problems or  questions.    Richard Delong MD

## 2019-02-27 DIAGNOSIS — C50.811 MALIGNANT NEOPLASM OF OVERLAPPING SITES OF RIGHT BREAST IN FEMALE, ESTROGEN RECEPTOR POSITIVE: ICD-10-CM

## 2019-02-27 DIAGNOSIS — C77.3 MALIGNANT NEOPLASM METASTATIC TO LYMPH NODE OF AXILLA: ICD-10-CM

## 2019-02-27 DIAGNOSIS — Z17.0 MALIGNANT NEOPLASM OF OVERLAPPING SITES OF RIGHT BREAST IN FEMALE, ESTROGEN RECEPTOR POSITIVE: ICD-10-CM

## 2019-02-27 DIAGNOSIS — G89.3 NEOPLASM RELATED PAIN: ICD-10-CM

## 2019-02-27 DIAGNOSIS — C79.51 SECONDARY CANCER OF BONE: ICD-10-CM

## 2019-03-01 ENCOUNTER — PATIENT MESSAGE (OUTPATIENT)
Dept: HEMATOLOGY/ONCOLOGY | Facility: CLINIC | Age: 49
End: 2019-03-01

## 2019-03-05 ENCOUNTER — TELEPHONE (OUTPATIENT)
Dept: HEMATOLOGY/ONCOLOGY | Facility: CLINIC | Age: 49
End: 2019-03-05

## 2019-03-05 DIAGNOSIS — F41.9 ANXIETY: ICD-10-CM

## 2019-03-05 NOTE — TELEPHONE ENCOUNTER
SW contacted pt to discuss distress score of 8 and the purpose of the distress score as it identifies the most immediate needs. Pt stated that she previously was sad due to death in family but she and her family are comforting each other. SW briefly introduced SW checklist and informed pt that when she meets SW in person then there will be a more depth conversation if needed. SW explained that she can start thinking of issues that can be discussed and resolved once she meets with SW on her appointment date 3/21/19. Pt verbalized understanding and appreciation.    SW will f/u with pt on 3/21/19.

## 2019-03-05 NOTE — TELEPHONE ENCOUNTER
----- Message from Angela Crabtree sent at 3/5/2019  4:15 PM CST -----  Contact: Patient  Type:  RX Refill Request    Who Called: Josey  Refill or New Rx: refill  RX Name and Strength: Xanax 5 mg  How is the patient currently taking it? (ex. 1XDay): 1 tablet every 4-6 hours as needed  Is this a 30 day or 90 day RX: 30 day  Preferred Pharmacy with phone number:   [facility] [address] [place] [id number]  Phone: [phone] Fax: [phone]    Local or Mail Order: local  Ordering Provider: Baljeet  Would the patient rather a call back or a response via [facility]?  Call back   Best Call Back Number: [phone]  Additional Information: n/a    Tab,  Angela

## 2019-03-06 RX ORDER — ALPRAZOLAM 0.5 MG/1
0.5 TABLET ORAL 2 TIMES DAILY PRN
Qty: 60 TABLET | Refills: 0 | Status: SHIPPED | OUTPATIENT
Start: 2019-03-06 | End: 2019-04-05 | Stop reason: SDUPTHER

## 2019-03-07 DIAGNOSIS — C77.3 MALIGNANT NEOPLASM METASTATIC TO LYMPH NODE OF AXILLA: ICD-10-CM

## 2019-03-07 DIAGNOSIS — Z17.0 MALIGNANT NEOPLASM OF OVERLAPPING SITES OF RIGHT BREAST IN FEMALE, ESTROGEN RECEPTOR POSITIVE: ICD-10-CM

## 2019-03-07 DIAGNOSIS — G89.3 NEOPLASM RELATED PAIN: ICD-10-CM

## 2019-03-07 DIAGNOSIS — C79.51 SECONDARY CANCER OF BONE: ICD-10-CM

## 2019-03-07 DIAGNOSIS — C50.811 MALIGNANT NEOPLASM OF OVERLAPPING SITES OF RIGHT BREAST IN FEMALE, ESTROGEN RECEPTOR POSITIVE: ICD-10-CM

## 2019-03-11 DIAGNOSIS — C50.811 MALIGNANT NEOPLASM OF OVERLAPPING SITES OF RIGHT BREAST IN FEMALE, ESTROGEN RECEPTOR POSITIVE: ICD-10-CM

## 2019-03-11 DIAGNOSIS — Z17.0 MALIGNANT NEOPLASM OF OVERLAPPING SITES OF RIGHT BREAST IN FEMALE, ESTROGEN RECEPTOR POSITIVE: ICD-10-CM

## 2019-03-11 DIAGNOSIS — G89.3 NEOPLASM RELATED PAIN: ICD-10-CM

## 2019-03-11 DIAGNOSIS — C79.51 SECONDARY CANCER OF BONE: ICD-10-CM

## 2019-03-11 DIAGNOSIS — C77.3 MALIGNANT NEOPLASM METASTATIC TO LYMPH NODE OF AXILLA: ICD-10-CM

## 2019-03-11 RX ORDER — HYDROCODONE BITARTRATE AND ACETAMINOPHEN 7.5; 325 MG/1; MG/1
1 TABLET ORAL EVERY 6 HOURS PRN
Qty: 120 TABLET | Refills: 0 | Status: SHIPPED | OUTPATIENT
Start: 2019-03-11 | End: 2019-04-11 | Stop reason: SDUPTHER

## 2019-03-21 ENCOUNTER — INFUSION (OUTPATIENT)
Dept: INFUSION THERAPY | Facility: HOSPITAL | Age: 49
End: 2019-03-21
Attending: INTERNAL MEDICINE
Payer: MEDICAID

## 2019-03-21 ENCOUNTER — OFFICE VISIT (OUTPATIENT)
Dept: HEMATOLOGY/ONCOLOGY | Facility: CLINIC | Age: 49
End: 2019-03-21
Payer: MEDICAID

## 2019-03-21 VITALS
WEIGHT: 223.31 LBS | OXYGEN SATURATION: 97 % | DIASTOLIC BLOOD PRESSURE: 64 MMHG | TEMPERATURE: 98 F | HEART RATE: 87 BPM | SYSTOLIC BLOOD PRESSURE: 105 MMHG | BODY MASS INDEX: 35.05 KG/M2 | BODY MASS INDEX: 35.05 KG/M2 | HEIGHT: 67 IN | WEIGHT: 223.31 LBS | HEIGHT: 67 IN

## 2019-03-21 DIAGNOSIS — C50.811 MALIGNANT NEOPLASM OF OVERLAPPING SITES OF RIGHT BREAST IN FEMALE, ESTROGEN RECEPTOR POSITIVE: ICD-10-CM

## 2019-03-21 DIAGNOSIS — R05.9 COUGH: ICD-10-CM

## 2019-03-21 DIAGNOSIS — C79.51 SECONDARY CANCER OF BONE: ICD-10-CM

## 2019-03-21 DIAGNOSIS — C79.51 SECONDARY CANCER OF BONE: Primary | ICD-10-CM

## 2019-03-21 DIAGNOSIS — C50.811 MALIGNANT NEOPLASM OF OVERLAPPING SITES OF RIGHT BREAST IN FEMALE, ESTROGEN RECEPTOR POSITIVE: Primary | ICD-10-CM

## 2019-03-21 DIAGNOSIS — C77.3 MALIGNANT NEOPLASM METASTATIC TO LYMPH NODE OF AXILLA: ICD-10-CM

## 2019-03-21 DIAGNOSIS — G89.3 NEOPLASM RELATED PAIN: ICD-10-CM

## 2019-03-21 DIAGNOSIS — E53.8 FOLATE DEFICIENCY: ICD-10-CM

## 2019-03-21 DIAGNOSIS — Z17.0 MALIGNANT NEOPLASM OF OVERLAPPING SITES OF RIGHT BREAST IN FEMALE, ESTROGEN RECEPTOR POSITIVE: Primary | ICD-10-CM

## 2019-03-21 DIAGNOSIS — E53.8 VITAMIN B12 DEFICIENCY: ICD-10-CM

## 2019-03-21 DIAGNOSIS — Z17.0 MALIGNANT NEOPLASM OF OVERLAPPING SITES OF RIGHT BREAST IN FEMALE, ESTROGEN RECEPTOR POSITIVE: ICD-10-CM

## 2019-03-21 PROCEDURE — 99215 OFFICE O/P EST HI 40 MIN: CPT | Mod: 25,S$PBB,, | Performed by: INTERNAL MEDICINE

## 2019-03-21 PROCEDURE — 99999 PR PBB SHADOW E&M-EST. PATIENT-LVL III: CPT | Mod: PBBFAC,,, | Performed by: INTERNAL MEDICINE

## 2019-03-21 PROCEDURE — 63600175 PHARM REV CODE 636 W HCPCS: Mod: JG | Performed by: INTERNAL MEDICINE

## 2019-03-21 PROCEDURE — 99999 PR PBB SHADOW E&M-EST. PATIENT-LVL III: ICD-10-PCS | Mod: PBBFAC,,, | Performed by: INTERNAL MEDICINE

## 2019-03-21 PROCEDURE — 96372 THER/PROPH/DIAG INJ SC/IM: CPT

## 2019-03-21 PROCEDURE — 99213 OFFICE O/P EST LOW 20 MIN: CPT | Mod: PBBFAC,PN,25 | Performed by: INTERNAL MEDICINE

## 2019-03-21 PROCEDURE — 99215 PR OFFICE/OUTPT VISIT, EST, LEVL V, 40-54 MIN: ICD-10-PCS | Mod: 25,S$PBB,, | Performed by: INTERNAL MEDICINE

## 2019-03-21 RX ORDER — BENZONATATE 100 MG/1
100 CAPSULE ORAL EVERY 6 HOURS PRN
Qty: 30 CAPSULE | Refills: 1 | Status: SHIPPED | OUTPATIENT
Start: 2019-03-21 | End: 2019-07-23

## 2019-03-21 RX ADMIN — DENOSUMAB 120 MG: 120 INJECTION SUBCUTANEOUS at 02:03

## 2019-03-21 NOTE — PROGRESS NOTES
Reason for visit: Right breast carcinoma  Date of Diagnosis: January 12, 2017    HPI:   The patient is a 48-year-old  female who presents to the hematology oncology clinic today for follow up for metastatic right breast carcinoma.    I have reviewed all of the patient's relevant clinical history available in the medical record and have utilized this in my evaluation and management recommendations today including the details of her recent visit to [facility] [place] [facility] on September 15, 2018.  The patient had 2 separate areas in the right breast that were biopsied area the first was a right breast mass at 11:00 which was 3 cm from the nipple which showed invasive mammary carcinoma.  The invasive carcinoma measured at least 1.2 cm in greatest dimension and appeared high-grade.  This tumor was ER positive/AR positive/HER-2 negative.  The second breast mass was biopsied from the retroperitoneal area lower area and was also positive for invasive mammary carcinoma.  This measured at least 0.4 cm and also appeared high-grade.  The tumor is morphologically very similar to the tumor in the prior specimen.  Receptor studies were not done on this specimen.  The patient also had a palpable right axillary lymph node which was biopsied and showed metastatic mammary carcinoma which measured at least 1.3 cm in greatest dimension.  The patient had self palpated this breast mass.  In addition the patient has a palpable mass in the region of her right elbow which she reports has been present for 9 years but had been slowly growing especially over the past year or 2.  She denies any pain associated with this.    She is also status post total thyroidectomy for papillary thyroid carcinoma.  Today the patient reports that she has been having a recurrent rash on her face.  This has improved with 2 courses of p.o. methylprednisolone as an outpatient but seems to be a recurrent issue in the past couple of  months.  She also reports feeling tired and fatigued.  She reports having trouble sleeping at night.  She reports chronic low  back pain.  She has been having chronic problems with anxiety and depression.  However she denies any suicidal or homicidal ideation.  She is taking norco PRN neoplasm related pain.  She denies any fevers or chills. Denies night sweats.  She denies any melena, hematochezia, hematemesis, hemoptysis or hematuria. She reports intermittent non productive cough.  She continues on letrozole. Ibrance was started on 2/21/17.     PAST MEDICAL HISTORY:   1. HSIL on pap smear s/p LEEP  2. Retinal hemorrhage in left eye    SURGICAL HISTORY:   1.  Tonsillectomy and adenoidectomy  2.  Appendectomy  3.  Cholecystectomy  4.  D&C  5.  Thyroidectomy on August 31, 2017  6.  Right axillary hydradenitis/cellulitis and left inguinal hydradenitis/cellulitis s/p debridement and skin grafting    FAMILY HISTORY: She reports that her maternal aunt had lung cancer in her 70s.  She used to smoke cigarettes.  Her maternal cousin had lung cancer in her 60s.  She used to smoke cigarettes.  She denies any other immediate family members with cancer or bleeding/clotting disorders.    SOCIAL HISTORY: She reports a 46+ pack year smoking history. She drinks wine occasionally.  She denies any history of recreational drug use.  She is engaged and has 2 sons.  She used to work for the [employer].  She lives in [place], [place].     ALLERGIES: [NKDA]    MEDICATIONS: [Medcard has been reviewed and/or reconciled.]    REVIEW OF SYSTEMS:   GENERAL: [No fevers, chills or sweats. Reports fatigue. Denies weight loss. Denies loss of appetite.]  HEENT: [No blurred vision, tinnitus. Reports sinus discharge is improved. Reports sorethroat is improved. Denies dysphagia.]  HEART: [No chest pain, palpitations or shortness of breath.]    LUNGS: [Reports chronic cough. Denies hemoptysis or breathing problems.]  ABDOMEN: [No abdominal pain,  nausea, vomiting, diarrhea, constipation or melena.]  GENITOURINARY: [No dysuria, bleeding or malodorous discharge.]  NEURO: [Reports headache. Denies dizziness or vertigo.]  HEMATOLOGY: [No easy bruising, spontaneous bleeding or blood clots in the past].  MUSCULOSKELETAL: [Reports arthralgias, myalgias and bone pains.]  SKIN: [Reports rash on face.]  PSYCHIATRY: [Reports depression. Reports anxiety.]  Denies suicidal/homicidal ideation.    PHYSICAL EXAMINATION:   VS: Reviewed on nurse's notes.  APPEARANCE: The patient is a well-developed, well-nourished and well-groomed  female who appears in no acute distress.   HEENT: No scleral icterus. Both external auditory canals clear. No oral ulcers, lesions. Throat clear  HEAD: No sinus tenderness.  NECK: Supple. No palpable lymphadenopathy. Thyroid non-tender, no palpable masses  CHEST: Breath sounds clear bilaterally. No rales. No rhonchi. Unlabored respirations.  BREAST: Deferred today.  CARDIOVASCULAR: Normal S1, S2. Normal rate. Regular rhythm.  ABDOMEN: Bowel sounds normal. No tenderness. No abdominal distention. No hepatomegaly. No splenomegaly.  BACK: Palpable tenderness in the lower back.  LYMPHATIC: No palpable supraclavicular lymphadenopathy.   EXTREMITIES: No clubbing, cyanosis. Palpable firm 3 cm mass in the right elbow is noted. Trace edema in bilateral lower extremities.      SKIN:  Mild urticarial skin rash noted on face   NEUROLOGIC: No focal findings. Alert & Oriented x 3. Mood appropriate to affect    LABS:   Reviewed    IMAGING:  Reviewed    IMPRESSION:  1.  Metastatic multifocal infiltrating ductal carcinoma of the right breast with right axillary and bone involvement [ER positive/LA positive/HER-2 negative]  2.  Papillary thyroid carcinoma  3.  Tobacco abuse  4.  Anxiety and depression  5.  Vitamin B12 deficiency-improved  6.  Folic acid deficiency-improved  7.  Urticaria    PLAN:  1.  I had a detailed discussion with the patient previously  with regard to the diagnosis, prognosis and treatment options for metastatic multifocal invasive ductal carcinoma of the right breast with right axillary and bone involvement.  2.  I have discussed that at this time her metastatic malignancy is potentially treatable but not curable.  3.  The patient was previously encouraged to quit smoking.  She expressed understanding.  Prescription for Xanax when necessary anxiety was previously given to the patient after full discussion of sedation precautions.    4. FSH and estradiol levels confirm menopausal status. TSH lab was normal.   5.  MRI of the brain on 1/31/17 to evaluate for any evidence of metastatic disease to complete staging workup was negative for metastatic malignancy to the brain. CT head done in March 2017 in ED was also ok.  6.  At this time I have recommended that she restart ibrance and continue letrozole as well.  7. She has been advised to take calcium and vit d supplementation everyday. She is on xgeva injections for treatment of bone involvement by malignancy. Risks/benefits and common side effects discussed and she expressed understanding and agreement. She has dentures.  I will restart Xgeva today.  8. Continue Lasix PRN for treatment of bilateral lower extremity edema.  Continue supplemental potassium was given as well.  Advised to use compression stockings and elevate legs.  9.  She will continue follow up with with Dr. Jason with endocrinology for follow up of papillary thyroid carcinoma.    10.  Results of MRI of the thoracic and lumbar spine from 9/21/17 for evaluation of current acute symptoms related to back pain reviewed in detail with pateint. No acute process involving the spinal cord is noted. She does have bone involvement by malignancy in multiple areas. However the PET/CT done on 9/27/17 shows no evidence of new areas of bony or visceral involvement.  Hence there appears to be no evidence of metastatic disease progression.  However  since the patient continues to have low back pain and MRI of lumbar spine does show enhancing marrow replacing metastatic lesions are seen in the bilateral iliac bones posteriorly. She did see radiation oncology and completed palliative XRT to this location which helped significantly with her symptoms.    11.  The patient will continue follow up with Dr. Guerin with clinical psychology for her chronic anxiety and depression.  The patient has been advised to seek immediate medical attention if any suicidal/homicidal ideation.  She expressed understanding.  12. CT scans done in Feb 2019 shows that her metastatic breast cancer continues to be stable/well controlled. I will plan to recheck again in June 2019.    13. She will continue potassium supplementation as recommended.  14.  She did complete p.o. folic acid after current prescription runs out as most recent folate level is normal. She will continue oral vitamin B12 supplementation for the rest of her life.  The rationale for this was reviewed in detail and she expressed understanding. I will recheck again next month.  15.  Refill for Celexa was given to the patient previously for treatment of her depression.  We discussed that this might also help her insomnia as her persistent insomnia might also be related to her depression.  She expressed understanding.  She will let me know if no improvement.    Return to clinic in 1 month with labs for her next dose of Xgeva. I will transition this to [id number] after dose in April 2019. She knows to call sooner for any new problems or questions.    Richard Delong MD

## 2019-03-21 NOTE — DISCHARGE INSTRUCTIONS
[facility]  [address] [address]  [address]  [phone] phone     [phone] fax  Hours of Operation: Monday- Friday 8:00am- 5:00pm  After hours phone  [phone]  Hematology / Oncology Physicians on call      Dr. Jake Arce      Please call with any concerns regarding your appointment today.

## 2019-04-02 RX ORDER — LEVOTHYROXINE SODIUM 137 UG/1
TABLET ORAL
Qty: 30 TABLET | Refills: 0 | Status: SHIPPED | OUTPATIENT
Start: 2019-04-02 | End: 2019-05-02

## 2019-04-05 DIAGNOSIS — F41.9 ANXIETY: ICD-10-CM

## 2019-04-05 RX ORDER — ALPRAZOLAM 0.5 MG/1
0.5 TABLET ORAL 2 TIMES DAILY PRN
Qty: 60 TABLET | Refills: 0 | Status: SHIPPED | OUTPATIENT
Start: 2019-04-05 | End: 2019-05-06 | Stop reason: SDUPTHER

## 2019-04-11 ENCOUNTER — TELEPHONE (OUTPATIENT)
Dept: HEMATOLOGY/ONCOLOGY | Facility: CLINIC | Age: 49
End: 2019-04-11

## 2019-04-11 DIAGNOSIS — G89.3 NEOPLASM RELATED PAIN: ICD-10-CM

## 2019-04-11 DIAGNOSIS — C77.3 MALIGNANT NEOPLASM METASTATIC TO LYMPH NODE OF AXILLA: ICD-10-CM

## 2019-04-11 DIAGNOSIS — C50.811 MALIGNANT NEOPLASM OF OVERLAPPING SITES OF RIGHT BREAST IN FEMALE, ESTROGEN RECEPTOR POSITIVE: ICD-10-CM

## 2019-04-11 DIAGNOSIS — C79.51 SECONDARY CANCER OF BONE: ICD-10-CM

## 2019-04-11 DIAGNOSIS — Z17.0 MALIGNANT NEOPLASM OF OVERLAPPING SITES OF RIGHT BREAST IN FEMALE, ESTROGEN RECEPTOR POSITIVE: ICD-10-CM

## 2019-04-11 RX ORDER — HYDROCODONE BITARTRATE AND ACETAMINOPHEN 7.5; 325 MG/1; MG/1
1 TABLET ORAL EVERY 6 HOURS PRN
Qty: 120 TABLET | Refills: 0 | Status: SHIPPED | OUTPATIENT
Start: 2019-04-11 | End: 2019-05-10 | Stop reason: SDUPTHER

## 2019-04-11 NOTE — TELEPHONE ENCOUNTER
----- Message from Myles Beaulieu sent at 4/11/2019 10:24 AM CDT -----  Contact: self- [phone] or [phone]  ..Type:  RX Refill Request    Who Called: self  Refill or New Rx:refill  RX Name and Strength:Hydrocode 7.5 mg   How is the patient currently taking it? (ex. 1XDay):1 every 6 hrs as needed for pain  Is this a 30 day or 90 day RX:30  Preferred Pharmacy with phone number:.    [facility] [address] [address]  Phone: [phone] Fax: [phone]    Local or Mail Order:local  Ordering Provider:Dr Delong  Would the patient rather a call back or a response via [facility]? Call back  Best Call Back Number:[phone] or [phone]  Additional Information:

## 2019-04-18 ENCOUNTER — TELEPHONE (OUTPATIENT)
Dept: INFUSION THERAPY | Facility: HOSPITAL | Age: 49
End: 2019-04-18

## 2019-04-18 ENCOUNTER — HOSPITAL ENCOUNTER (EMERGENCY)
Facility: HOSPITAL | Age: 49
Discharge: HOME | End: 2019-04-18
Attending: EMERGENCY MEDICINE
Payer: MEDICAID

## 2019-04-18 VITALS
OXYGEN SATURATION: 97 % | HEART RATE: 88 BPM | BODY MASS INDEX: 35.09 KG/M2 | WEIGHT: 223.56 LBS | TEMPERATURE: 98 F | HEIGHT: 67 IN | DIASTOLIC BLOOD PRESSURE: 71 MMHG | RESPIRATION RATE: 18 BRPM | SYSTOLIC BLOOD PRESSURE: 147 MMHG

## 2019-04-18 DIAGNOSIS — H60.91 OTITIS EXTERNA OF RIGHT EAR, UNSPECIFIED CHRONICITY, UNSPECIFIED TYPE: ICD-10-CM

## 2019-04-18 DIAGNOSIS — H66.91 RIGHT OTITIS MEDIA, UNSPECIFIED OTITIS MEDIA TYPE: Primary | ICD-10-CM

## 2019-04-18 DIAGNOSIS — H61.21 IMPACTED CERUMEN OF RIGHT EAR: ICD-10-CM

## 2019-04-18 PROCEDURE — 99284 EMERGENCY DEPT VISIT MOD MDM: CPT

## 2019-04-18 RX ORDER — CIPROFLOXACIN HYDROCHLORIDE 3 MG/ML
3 SOLUTION/ DROPS OPHTHALMIC EVERY 12 HOURS
Qty: 42 DROP | Refills: 0 | Status: SHIPPED | OUTPATIENT
Start: 2019-04-18 | End: 2019-04-25

## 2019-04-18 RX ORDER — AMOXICILLIN 875 MG/1
875 TABLET, FILM COATED ORAL 2 TIMES DAILY
Qty: 20 TABLET | Refills: 0 | Status: SHIPPED | OUTPATIENT
Start: 2019-04-18 | End: 2019-04-28

## 2019-04-18 NOTE — TELEPHONE ENCOUNTER
Attempted to call pt to reschedule no answer no voicemail.  ----- Message from Tan Alvarado sent at 4/18/2019 12:28 PM CDT -----  Contact: pt   Pt needs to nuria infusion due to weather        ..[phone] (home)

## 2019-04-18 NOTE — ED PROVIDER NOTES
History      Chief Complaint   Patient presents with    Otalgia     R ear pain after bumping R ear yesterday with small amount of drainage        Review of patient's allergies indicates:   Allergen Reactions    Nsaids (non-steroidal anti-inflammatory drug) Other (See Comments)     Instructed not to take NSAID drugs with chemo pill.    Vancomycin analogues Itching        HPI   HPI    4/18/2019, 1:52 PM   History obtained from the patient      History of Present Illness: Josey Flores is a 48 y.o. female patient who presents to the Emergency Department for right ear pain yesterday since bumping ear.  Denies fever.  Symptoms are moderate in severity.     No further complaints or concerns at this time.           PCP: Aileen Sadler MD       Past Medical History:  Past Medical History:   Diagnosis Date    Anxiety     Asthma     Cancer     right breast, metastatic-lymph nodes, bone, and thyroid     COPD (chronic obstructive pulmonary disease)     Depression     History of psychiatric hospitalization     2000; suicidal ideation    Hx of psychiatric care     Hypothyroidism     Miscarriage     five miscarriages    Obesity     Psychiatric problem     Thyroid cancer 2017         Past Surgical History:  Past Surgical History:   Procedure Laterality Date    ABSCESS DRAINAGE      bilateral axilla    ADENOIDECTOMY      APPENDECTOMY      CHOLECYSTECTOMY      EXCHANGE-WOUND VAC Bilateral 5/19/2017    [facility]    EXCISION-HIDRADENITIS Bilateral 5/17/2017    [facility]    EXCISION-MASS-ARM Right 3/14/2018    [facility]    XKLMHJZBDIGW-HMSHMXFT-PULDXSYMW N/A 12/14/2016    [facility]rika [facility]    INCISION AND DRAINAGE (I & D)-GROIN Left 6/16/2017    [facility]    INCISION AND DRAINAGE-THIGH Right 6/16/2017    [facility]    MASS EXCISION      PLACEMENT-WOUND VAC Bilateral 2017    [facility]    SHAVING-ENDOMETRIAL  2016    [facility]    SKIN GRAFT  2017    SKIN GRAFT-SPLIT THICKNESS Left 2017    [facility]    THYROIDECTOMY Bilateral     THYROIDECTOMY Bilateral 2017    [facility]    TONSILLECTOMY             Family History:  Family History   Problem Relation Age of Onset    Arthritis Mother     Early death Mother         64 at time of death    Heart attack Mother     Heart disease Mother     Heart failure Mother     Hypertension Mother     Coronary artery disease Father     Hearing loss Father     Heart disease Father     Hyperlipidemia Father     Hypertension Father     Mental illness Father     Learning disabilities Son            Social History:  Social History     Tobacco Use    Smoking status: Current Every Day Smoker     Packs/day: 0.50     Years: 30.00     Pack years: 15.00     Types: Cigarettes     Last attempt to quit: 2017     Years since quittin.8    Smokeless tobacco: Never Used    Tobacco comment: 45 pack year history   Substance and Sexual Activity    Alcohol use: No    Drug use: No    Sexual activity: Yes     Partners: Male       ROS     Review of Systems   Constitutional: Negative for chills and fever.   HENT: Positive for ear discharge, ear pain and hearing loss. Negative for facial swelling and trouble swallowing.    Eyes: Negative for discharge, redness and visual disturbance.   Respiratory: Negative for chest tightness and shortness of breath.    Cardiovascular: Negative for chest pain and leg swelling.   Gastrointestinal: Negative for diarrhea and vomiting.   Genitourinary: Negative for decreased urine volume and dysuria.   Musculoskeletal: Negative for joint swelling and neck stiffness.   Skin: Negative for rash and  "wound.   Neurological: Negative for syncope and facial asymmetry.   All other systems reviewed and are negative.      Physical Exam      Initial Vitals [04/18/19 1329]   BP Pulse Resp Temp SpO2   (!) 147/71 88 18 97.5 °F (36.4 °C) 97 %      MAP       --         Physical Exam  Vital signs and nursing notes reviewed.  Constitutional: Patient is in NAD. Awake and alert. Well-developed and well-nourished.  Head: Atraumatic. Normocephalic.  Eyes: PERRL. EOM intact. Conjunctivae nl. No scleral icterus.  ENT: Mucous membranes are moist. Oropharynx is clear.  Right ear canal with large brown FB that was removed with forceps.  C/w cerumen plug.  TM appears erythematous.  Posterior ear canal very tender. No mastoid ttp  Neck: Supple. No JVD. No lymphadenopathy.  No meningismus  Cardiovascular: Regular rate and rhythm. No murmurs, rubs, or gallops. Distal pulses are 2+ and symmetric.  Pulmonary/Chest: No respiratory distress. Clear to auscultation bilaterally. No wheezing, rales, or rhonchi.  Abdominal: Soft. Non-distended. No TTP. No rebound, guarding, or rigidity. Good bowel sounds.  Genitourinary: No CVA tenderness  Musculoskeletal: Moves all extremities. No edema.   Skin: Warm and dry.  Neurological: Awake and alert. No acute focal neurological deficits are appreciated.  Psychiatric: Normal affect. Good eye contact. Appropriate in content.      ED Course          Procedures  ED Vital Signs:  Vitals:    04/18/19 1329   BP: (!) 147/71   Pulse: 88   Resp: 18   Temp: 97.5 °F (36.4 °C)   TempSrc: Oral   SpO2: 97%   Weight: 101.4 kg (223 lb 8.7 oz)   Height: 5' 7" (1.702 m)                 Imaging Results:  Imaging Results    None            The Emergency Provider reviewed the vital signs and test results, which are outlined above.    ED Discussion             Medication(s) given in the ER:  Medications - No data to display        Follow-up Information     Aileen Sadler MD In 2 days.    Specialty:  Family Medicine  Contact " information:  [address]  [place] [id number]                          Medication List      START taking these medications    amoxicillin 875 MG tablet  Commonly known as:  AMOXIL  Take 1 tablet (875 mg total) by mouth 2 (two) times daily. for 10 days     ciprofloxacin HCl 0.3 % ophthalmic solution  Commonly known as:  CILOXAN  Place 3 drops into the right ear every 12 (twelve) hours. for 7 days        ASK your doctor about these medications    albuterol 90 mcg/actuation inhaler  Commonly known as:  PROVENTIL/VENTOLIN HFA  Inhale 2 puffs into the lungs every 6 (six) hours.     ALPRAZolam 0.5 MG tablet  Commonly known as:  XANAX  Take 1 tablet (0.5 mg total) by mouth 2 (two) times daily as needed for Insomnia or Anxiety.     benzonatate 100 MG capsule  Commonly known as:  TESSALON PERLES  Take 1 capsule (100 mg total) by mouth every 6 (six) hours as needed for Cough.     calcium carbonate 400 mg calcium (1,000 mg) Chew     citalopram 20 MG tablet  Commonly known as:  CeleXA  Take 1 tablet (20 mg total) by mouth every evening.     cyanocobalamin 1000 MCG tablet  Commonly known as:  VITAMIN B-12  Take 1 tablet (1,000 mcg total) by mouth once daily.     folic acid 1 MG tablet  Commonly known as:  FOLVITE  Take 1 tablet (1 mg total) by mouth once daily.     HYDROcodone-acetaminophen 7.5-325 mg per tablet  Commonly known as:  NORCO  Take 1 tablet by mouth every 6 (six) hours as needed for Pain.     letrozole 2.5 mg Tab  Commonly known as:  FEMARA  TAKE 1 TABLET BY MOUTH ONCE DAILY     levothyroxine 137 MCG Tab tablet  Commonly known as:  SYNTHROID  TAKE ONE TABLET BY MOUTH BEFORE BREAKFAST     multivitamin per tablet  Commonly known as:  THERAGRAN     palbociclib 125 mg Cap  Commonly known as:  IBRANCE  Take 125 mg by mouth once daily For 21 days followed by 7 days off.     VITAMIN D2 50,000 unit Cap  Generic drug:  ergocalciferol           Where to Get Your Medications      You can get these medications  from any pharmacy    Bring a paper prescription for each of these medications  · amoxicillin 875 MG tablet  · ciprofloxacin HCl 0.3 % ophthalmic solution             Medical Decision Making        All findings were reviewed with the patient/family in detail.   All remaining questions and concerns were addressed at that time.  Patient/family has been counseled regarding the need for follow-up as well as the indication to return to the emergency room should new or worrisome developments occur.        MDM               Clinical Impression:        ICD-10-CM ICD-9-CM   1. Right otitis media, unspecified otitis media type H66.91 382.9   2. Impacted cerumen of right ear H61.21 380.4   3. Otitis externa of right ear, unspecified chronicity, unspecified type H60.91 380.10             Ana Laura Graff PA-C  04/18/19 1400

## 2019-04-26 ENCOUNTER — OFFICE VISIT (OUTPATIENT)
Dept: HEMATOLOGY/ONCOLOGY | Facility: CLINIC | Age: 49
End: 2019-04-26
Payer: MEDICAID

## 2019-04-26 ENCOUNTER — LAB VISIT (OUTPATIENT)
Dept: LAB | Facility: HOSPITAL | Age: 49
End: 2019-04-26
Attending: INTERNAL MEDICINE
Payer: MEDICAID

## 2019-04-26 ENCOUNTER — INFUSION (OUTPATIENT)
Dept: INFUSION THERAPY | Facility: HOSPITAL | Age: 49
End: 2019-04-26
Attending: INTERNAL MEDICINE
Payer: MEDICAID

## 2019-04-26 VITALS
HEART RATE: 87 BPM | TEMPERATURE: 98 F | HEIGHT: 67 IN | BODY MASS INDEX: 35.26 KG/M2 | DIASTOLIC BLOOD PRESSURE: 81 MMHG | OXYGEN SATURATION: 97 % | SYSTOLIC BLOOD PRESSURE: 121 MMHG | WEIGHT: 224.63 LBS

## 2019-04-26 DIAGNOSIS — E53.8 FOLATE DEFICIENCY: ICD-10-CM

## 2019-04-26 DIAGNOSIS — E53.8 VITAMIN B12 DEFICIENCY: ICD-10-CM

## 2019-04-26 DIAGNOSIS — Z17.0 MALIGNANT NEOPLASM OF OVERLAPPING SITES OF RIGHT BREAST IN FEMALE, ESTROGEN RECEPTOR POSITIVE: Primary | ICD-10-CM

## 2019-04-26 DIAGNOSIS — C79.51 SECONDARY CANCER OF BONE: ICD-10-CM

## 2019-04-26 DIAGNOSIS — G89.3 NEOPLASM RELATED PAIN: ICD-10-CM

## 2019-04-26 DIAGNOSIS — C50.811 MALIGNANT NEOPLASM OF OVERLAPPING SITES OF RIGHT BREAST IN FEMALE, ESTROGEN RECEPTOR POSITIVE: Primary | ICD-10-CM

## 2019-04-26 DIAGNOSIS — C77.3 MALIGNANT NEOPLASM METASTATIC TO LYMPH NODE OF AXILLA: ICD-10-CM

## 2019-04-26 DIAGNOSIS — C79.51 SECONDARY CANCER OF BONE: Primary | ICD-10-CM

## 2019-04-26 DIAGNOSIS — C50.811 MALIGNANT NEOPLASM OF OVERLAPPING SITES OF RIGHT BREAST IN FEMALE, ESTROGEN RECEPTOR POSITIVE: ICD-10-CM

## 2019-04-26 DIAGNOSIS — Z17.0 MALIGNANT NEOPLASM OF OVERLAPPING SITES OF RIGHT BREAST IN FEMALE, ESTROGEN RECEPTOR POSITIVE: ICD-10-CM

## 2019-04-26 LAB
ALBUMIN SERPL BCP-MCNC: 3.7 G/DL (ref 3.5–5.2)
ALP SERPL-CCNC: 51 U/L (ref 55–135)
ALT SERPL W/O P-5'-P-CCNC: 19 U/L (ref 10–44)
ANION GAP SERPL CALC-SCNC: 9 MMOL/L (ref 8–16)
AST SERPL-CCNC: 19 U/L (ref 10–40)
BASOPHILS # BLD AUTO: 0.05 K/UL (ref 0–0.2)
BASOPHILS NFR BLD: 1.3 % (ref 0–1.9)
BILIRUB SERPL-MCNC: 0.4 MG/DL (ref 0.1–1)
BUN SERPL-MCNC: 7 MG/DL (ref 6–20)
CALCIUM SERPL-MCNC: 9.6 MG/DL (ref 8.7–10.5)
CHLORIDE SERPL-SCNC: 106 MMOL/L (ref 95–110)
CO2 SERPL-SCNC: 25 MMOL/L (ref 23–29)
CREAT SERPL-MCNC: 1.2 MG/DL (ref 0.5–1.4)
DIFFERENTIAL METHOD: ABNORMAL
EOSINOPHIL # BLD AUTO: 0.1 K/UL (ref 0–0.5)
EOSINOPHIL NFR BLD: 1.5 % (ref 0–8)
ERYTHROCYTE [DISTWIDTH] IN BLOOD BY AUTOMATED COUNT: 13.1 % (ref 11.5–14.5)
EST. GFR  (AFRICAN AMERICAN): >60 ML/MIN/1.73 M^2
EST. GFR  (NON AFRICAN AMERICAN): 53 ML/MIN/1.73 M^2
FOLATE SERPL-MCNC: 6.9 NG/ML (ref 4–24)
GLUCOSE SERPL-MCNC: 111 MG/DL (ref 70–110)
HCT VFR BLD AUTO: 36.6 % (ref 37–48.5)
HGB BLD-MCNC: 12.5 G/DL (ref 12–16)
IMM GRANULOCYTES # BLD AUTO: 0.02 K/UL (ref 0–0.04)
IMM GRANULOCYTES NFR BLD AUTO: 0.5 % (ref 0–0.5)
LYMPHOCYTES # BLD AUTO: 1.2 K/UL (ref 1–4.8)
LYMPHOCYTES NFR BLD: 31.4 % (ref 18–48)
MCH RBC QN AUTO: 37.8 PG (ref 27–31)
MCHC RBC AUTO-ENTMCNC: 34.2 G/DL (ref 32–36)
MCV RBC AUTO: 111 FL (ref 82–98)
MONOCYTES # BLD AUTO: 0.4 K/UL (ref 0.3–1)
MONOCYTES NFR BLD: 9.2 % (ref 4–15)
NEUTROPHILS # BLD AUTO: 2.2 K/UL (ref 1.8–7.7)
NEUTROPHILS NFR BLD: 56.6 % (ref 38–73)
NRBC BLD-RTO: 0 /100 WBC
PLATELET # BLD AUTO: 193 K/UL (ref 150–350)
PMV BLD AUTO: 10.8 FL (ref 9.2–12.9)
POTASSIUM SERPL-SCNC: 3.8 MMOL/L (ref 3.5–5.1)
PROT SERPL-MCNC: 7.1 G/DL (ref 6–8.4)
RBC # BLD AUTO: 3.31 M/UL (ref 4–5.4)
SODIUM SERPL-SCNC: 140 MMOL/L (ref 136–145)
VIT B12 SERPL-MCNC: 341 PG/ML (ref 210–950)
WBC # BLD AUTO: 3.92 K/UL (ref 3.9–12.7)

## 2019-04-26 PROCEDURE — 99999 PR PBB SHADOW E&M-EST. PATIENT-LVL III: ICD-10-PCS | Mod: PBBFAC,,, | Performed by: INTERNAL MEDICINE

## 2019-04-26 PROCEDURE — 99215 OFFICE O/P EST HI 40 MIN: CPT | Mod: 25,S$PBB,, | Performed by: INTERNAL MEDICINE

## 2019-04-26 PROCEDURE — 63600175 PHARM REV CODE 636 W HCPCS: Mod: JG | Performed by: INTERNAL MEDICINE

## 2019-04-26 PROCEDURE — 82607 VITAMIN B-12: CPT

## 2019-04-26 PROCEDURE — 82746 ASSAY OF FOLIC ACID SERUM: CPT

## 2019-04-26 PROCEDURE — 85025 COMPLETE CBC W/AUTO DIFF WBC: CPT

## 2019-04-26 PROCEDURE — 99215 PR OFFICE/OUTPT VISIT, EST, LEVL V, 40-54 MIN: ICD-10-PCS | Mod: 25,S$PBB,, | Performed by: INTERNAL MEDICINE

## 2019-04-26 PROCEDURE — 80053 COMPREHEN METABOLIC PANEL: CPT

## 2019-04-26 PROCEDURE — 99999 PR PBB SHADOW E&M-EST. PATIENT-LVL III: CPT | Mod: PBBFAC,,, | Performed by: INTERNAL MEDICINE

## 2019-04-26 PROCEDURE — 36415 COLL VENOUS BLD VENIPUNCTURE: CPT

## 2019-04-26 PROCEDURE — 96372 THER/PROPH/DIAG INJ SC/IM: CPT

## 2019-04-26 PROCEDURE — 99213 OFFICE O/P EST LOW 20 MIN: CPT | Mod: PBBFAC,PN | Performed by: INTERNAL MEDICINE

## 2019-04-26 RX ADMIN — DENOSUMAB 120 MG: 120 INJECTION SUBCUTANEOUS at 01:04

## 2019-04-26 NOTE — DISCHARGE INSTRUCTIONS
[facility]  [address] [address]  [phone] phone     [phone] fax  Hours of Operation: Monday- Friday 8:00am- 5:00pm  After hours phone  [phone]  Hematology / Oncology Physicians on call      Dr. Jake Arce    Please call with any concerns regarding your appointment today.

## 2019-04-26 NOTE — PROGRESS NOTES
Reason for visit: Right breast carcinoma  Date of Diagnosis: January 12, 2017    HPI:   The patient is a 48-year-old  female who presents to the hematology oncology clinic today for follow up for metastatic right breast carcinoma.    I have reviewed all of the patient's relevant clinical history available in the medical record and have utilized this in my evaluation and management recommendations today including the details of her recent visit to [facility] [place] [facility] on September 15, 2018.  The patient had 2 separate areas in the right breast that were biopsied area the first was a right breast mass at 11:00 which was 3 cm from the nipple which showed invasive mammary carcinoma.  The invasive carcinoma measured at least 1.2 cm in greatest dimension and appeared high-grade.  This tumor was ER positive/AL positive/HER-2 negative.  The second breast mass was biopsied from the retroperitoneal area lower area and was also positive for invasive mammary carcinoma.  This measured at least 0.4 cm and also appeared high-grade.  The tumor is morphologically very similar to the tumor in the prior specimen.  Receptor studies were not done on this specimen.  The patient also had a palpable right axillary lymph node which was biopsied and showed metastatic mammary carcinoma which measured at least 1.3 cm in greatest dimension.  The patient had self palpated this breast mass.  In addition the patient has a palpable mass in the region of her right elbow which she reports has been present for 9 years but had been slowly growing especially over the past year or 2.  She denies any pain associated with this.    She is also status post total thyroidectomy for papillary thyroid carcinoma.  Today the patient reports that she has been having a recurrent rash on her face.  This has improved with 2 courses of p.o. methylprednisolone as an outpatient but seems to be a recurrent issue in the past couple of  months.  She also reports feeling tired and fatigued.  She reports having trouble sleeping at night.  She reports chronic low  back pain.  She has been having chronic problems with anxiety and depression.  However she denies any suicidal or homicidal ideation.  She is taking norco PRN neoplasm related pain.  She denies any fevers or chills. Denies night sweats.  She denies any melena, hematochezia, hematemesis, hemoptysis or hematuria. She reports intermittent non productive cough.  She continues on letrozole. Ibrance was started on 2/21/17.     PAST MEDICAL HISTORY:   1. HSIL on pap smear s/p LEEP  2. Retinal hemorrhage in left eye    SURGICAL HISTORY:   1.  Tonsillectomy and adenoidectomy  2.  Appendectomy  3.  Cholecystectomy  4.  D&C  5.  Thyroidectomy on August 31, 2017  6.  Right axillary hydradenitis/cellulitis and left inguinal hydradenitis/cellulitis s/p debridement and skin grafting    FAMILY HISTORY: She reports that her maternal aunt had lung cancer in her 70s.  She used to smoke cigarettes.  Her maternal cousin had lung cancer in her 60s.  She used to smoke cigarettes.  She denies any other immediate family members with cancer or bleeding/clotting disorders.    SOCIAL HISTORY: She reports a 46+ pack year smoking history. She drinks wine occasionally.  She denies any history of recreational drug use.  She is engaged and has 2 sons.  She used to work for [employer].  She lives in [place], [place].     ALLERGIES: [NKDA]    MEDICATIONS: [Medcard has been reviewed and/or reconciled.]    REVIEW OF SYSTEMS:   GENERAL: [No fevers, chills or sweats. Reports fatigue. Denies weight loss. Denies loss of appetite.]  HEENT: [No blurred vision, tinnitus. Reports sinus discharge is improved. Reports sorethroat is improved. Denies dysphagia.]  HEART: [No chest pain, palpitations or shortness of breath.]    LUNGS: [Reports chronic cough. Denies hemoptysis or breathing problems.]  ABDOMEN: [No abdominal pain,  nausea, vomiting, diarrhea, constipation or melena.]  GENITOURINARY: [No dysuria, bleeding or malodorous discharge.]  NEURO: [Reports headache. Denies dizziness or vertigo.]  HEMATOLOGY: [No easy bruising, spontaneous bleeding or blood clots in the past].  MUSCULOSKELETAL: [Reports arthralgias, myalgias and bone pains.]  SKIN: [Reports rash on face.]  PSYCHIATRY: [Reports depression. Reports anxiety.]  Denies suicidal/homicidal ideation.    PHYSICAL EXAMINATION:   VS: Reviewed on nurse's notes.  APPEARANCE: The patient is a well-developed, well-nourished and well-groomed  female who appears in no acute distress.   HEENT: No scleral icterus. Both external auditory canals clear. No oral ulcers, lesions. Throat clear  HEAD: No sinus tenderness.  NECK: Supple. No palpable lymphadenopathy. Thyroid non-tender, no palpable masses  CHEST: Breath sounds clear bilaterally. No rales. No rhonchi. Unlabored respirations.  BREAST: Deferred today.  CARDIOVASCULAR: Normal S1, S2. Normal rate. Regular rhythm.  ABDOMEN: Bowel sounds normal. No tenderness. No abdominal distention. No hepatomegaly. No splenomegaly.  BACK: Palpable tenderness in the lower back.  LYMPHATIC: No palpable supraclavicular lymphadenopathy.   EXTREMITIES: No clubbing, cyanosis. Trace edema in bilateral lower extremities.      SKIN:  Mild urticarial skin rash noted on face   NEUROLOGIC: No focal findings. Alert & Oriented x 3. Mood appropriate to affect    LABS:   Reviewed    IMAGING:  Reviewed    IMPRESSION:  1.  Metastatic multifocal infiltrating ductal carcinoma of the right breast with right axillary and bone involvement [ER positive/CT positive/HER-2 negative]  2.  Papillary thyroid carcinoma  3.  Tobacco abuse  4.  Anxiety and depression  5.  Vitamin B12 deficiency-improved  6.  Folic acid deficiency-improved    PLAN:  1.  I had a detailed discussion with the patient previously with regard to the diagnosis, prognosis and treatment options for  metastatic multifocal invasive ductal carcinoma of the right breast with right axillary and bone involvement.  2.  I have discussed that at this time her metastatic malignancy is potentially treatable but not curable.  3.  The patient was previously encouraged to quit smoking.  She expressed understanding.  Prescription for Xanax when necessary anxiety was previously given to the patient after full discussion of sedation precautions.    4. FSH and estradiol levels confirm menopausal status. TSH lab was normal.   5.  MRI of the brain on 1/31/17 to evaluate for any evidence of metastatic disease to complete staging workup was negative for metastatic malignancy to the brain. CT head done in March 2017 in ED was also ok.  6.  At this time I have recommended that she restart ibrance and continue letrozole as well.  7. She has been advised to take calcium and vit d supplementation everyday. She is on xgeva injections for treatment of bone involvement by malignancy. Risks/benefits and common side effects discussed and she expressed understanding and agreement. She has dentures.  She will get Xgeva today.  8. Continue Lasix PRN for treatment of bilateral lower extremity edema.  Continue supplemental potassium was given as well.  Advised to use compression stockings and elevate legs.  9.  She will continue follow up with with Dr. Jason with endocrinology for follow up of papillary thyroid carcinoma.    10.  Results of MRI of the thoracic and lumbar spine from 9/21/17 for evaluation of current acute symptoms related to back pain reviewed in detail with pateint. No acute process involving the spinal cord is noted. She does have bone involvement by malignancy in multiple areas. However the PET/CT done on 9/27/17 shows no evidence of new areas of bony or visceral involvement.  Hence there appears to be no evidence of metastatic disease progression.  However since the patient continues to have low back pain and MRI of lumbar  spine does show enhancing marrow replacing metastatic lesions are seen in the bilateral iliac bones posteriorly. She did see radiation oncology and completed palliative XRT to this location which helped significantly with her symptoms.    11.  The patient will continue follow up with Dr. Guerin with clinical psychology for her chronic anxiety and depression.  The patient has been advised to seek immediate medical attention if any suicidal/homicidal ideation.  She expressed understanding.  12. CT scans done in Feb 2019 shows that her metastatic breast cancer continues to be stable/well controlled. I will plan to recheck again in June 2019.    13. She will continue potassium supplementation as recommended.  14.  She will continue oral vitamin B12 supplementation for the rest of her life.  The rationale for this was reviewed in detail and she expressed understanding. I will follow up with pending labs.  15.  Refill for Celexa was given to the patient previously for treatment of her depression.  We discussed that this might also help her insomnia as her persistent insomnia might also be related to her depression.  She expressed understanding.  She will let me know if no improvement.    Return to clinic in 1 month with labs. She knows to call sooner for any new problems or questions.    Richard Delong MD

## 2019-04-30 DIAGNOSIS — R05.9 COUGH: ICD-10-CM

## 2019-04-30 RX ORDER — LEVOTHYROXINE SODIUM 137 UG/1
137 TABLET ORAL
Qty: 30 TABLET | Refills: 0 | Status: CANCELLED | OUTPATIENT
Start: 2019-04-30

## 2019-05-01 ENCOUNTER — TELEPHONE (OUTPATIENT)
Dept: PULMONOLOGY | Facility: CLINIC | Age: 49
End: 2019-05-01

## 2019-05-01 RX ORDER — BENZONATATE 100 MG/1
100 CAPSULE ORAL EVERY 6 HOURS PRN
Qty: 30 CAPSULE | Refills: 1 | OUTPATIENT
Start: 2019-05-01 | End: 2020-04-30

## 2019-05-01 RX ORDER — LEVOTHYROXINE SODIUM 137 UG/1
137 TABLET ORAL
Qty: 30 TABLET | Refills: 0 | OUTPATIENT
Start: 2019-05-01

## 2019-05-01 RX ORDER — LEVOTHYROXINE SODIUM 137 UG/1
137 TABLET ORAL
Qty: 30 TABLET | Refills: 0 | Status: CANCELLED | OUTPATIENT
Start: 2019-05-01

## 2019-05-01 NOTE — TELEPHONE ENCOUNTER
----- Message from Nessa Jason MD sent at 5/1/2019  9:10 AM CDT -----  Please call this patient and tell her she needs an appointment as soon as possible.  Once appointment is scheduled I can refill a 30 day supply into she gets in

## 2019-05-01 NOTE — TELEPHONE ENCOUNTER
----- Message from Richard Delong MD sent at 5/1/2019  8:15 AM CDT -----  I got prescription refill request for Tessalon Perles and Synthroid.  With regard to the Tessalon Perles if she is still having a cough?  If she is still having a cough we need to make an appointment for her to see one of the pulmonary doctors to be evaluated for this.  I do not recall her mentioning that she still had the cough when I saw her recently.  With regard to the refill of the Synthroid please see the comments from Dr. Jason.  This refill needs to come from Dr. Jason and she needs to make an him appointment with endocrinology as soon as possible. Thank you

## 2019-05-01 NOTE — TELEPHONE ENCOUNTER
----- Message from Juliet Gale MA sent at 5/1/2019  9:09 AM CDT -----  Dr Delong would like pt to be seen for cough.. By one of the pulmonary doctors . Please schedule pt for an afternoon appt.   Thanks

## 2019-05-02 ENCOUNTER — OFFICE VISIT (OUTPATIENT)
Dept: ENDOCRINOLOGY | Facility: CLINIC | Age: 49
End: 2019-05-02
Payer: MEDICAID

## 2019-05-02 VITALS
BODY MASS INDEX: 35.26 KG/M2 | HEART RATE: 68 BPM | HEIGHT: 67 IN | DIASTOLIC BLOOD PRESSURE: 80 MMHG | OXYGEN SATURATION: 98 % | SYSTOLIC BLOOD PRESSURE: 118 MMHG | WEIGHT: 224.63 LBS

## 2019-05-02 DIAGNOSIS — Z85.850 HISTORY OF THYROID CANCER: Primary | ICD-10-CM

## 2019-05-02 DIAGNOSIS — E89.0 POSTOPERATIVE HYPOTHYROIDISM: ICD-10-CM

## 2019-05-02 PROCEDURE — 99999 PR PBB SHADOW E&M-EST. PATIENT-LVL III: ICD-10-PCS | Mod: PBBFAC,,, | Performed by: INTERNAL MEDICINE

## 2019-05-02 PROCEDURE — 99213 OFFICE O/P EST LOW 20 MIN: CPT | Mod: PBBFAC | Performed by: INTERNAL MEDICINE

## 2019-05-02 PROCEDURE — 99213 OFFICE O/P EST LOW 20 MIN: CPT | Mod: S$PBB,,, | Performed by: INTERNAL MEDICINE

## 2019-05-02 PROCEDURE — 99999 PR PBB SHADOW E&M-EST. PATIENT-LVL III: CPT | Mod: PBBFAC,,, | Performed by: INTERNAL MEDICINE

## 2019-05-02 PROCEDURE — 99213 PR OFFICE/OUTPT VISIT, EST, LEVL III, 20-29 MIN: ICD-10-PCS | Mod: S$PBB,,, | Performed by: INTERNAL MEDICINE

## 2019-05-02 RX ORDER — LEVOTHYROXINE SODIUM 150 UG/1
150 TABLET ORAL DAILY
Qty: 30 TABLET | Refills: 5 | Status: SHIPPED | OUTPATIENT
Start: 2019-05-02 | End: 2019-11-05 | Stop reason: SDUPTHER

## 2019-05-02 NOTE — PROGRESS NOTES
Patient ID: Josey Flores is a 48 y.o. female.  Patient is here for follow up  Last seen November 2017    She is here with her significant other      Chief Complaint: Follow-up      HPI    Patient with history of Metastatic multifocal infiltrating ductal carcinoma of the right breast with right axillary and bone involvement [ER positive/MD positive/HER-2 negative] for which she has received adjuvant therapy with good response with no FDG avid areas remaining regarding her breast cancer, who was found to have an FDG avid area on PET scan in the right thyroid, FNA was suspicious for papillary thyroid cancer, she subsequently underwent a total thyroidectomy performed by Dr. Zaid Baugh on August 31, 2017 and final pathology showed the following:  Thyroid, total thyroidectomy:  Papillary carcinoma, follicular variant, 2 cm, [id number].  Second focus of micropapillary carcinoma adjacent to main tumor.  Two lymph nodes negative for carcinoma.  See note and synoptic report below.  Note: Reviewed by Dr. Lois Ortiz who concurs with the diagnosis.  Synoptic report: Thyroid Gland  Procedure: Total thyroidectomy.  Tumor focality: Multifocal.  Tumor site: Right lobe.  Tumor size: 2 x 2 x 1.5 cm.  Histologic type: Papillary carcinoma, follicular variant, encapsulated/well demarcated, with tumor capsular  invasion.  Margins: Uninvolved by carcinoma.  Angioinvasion: Not identified.  Lymphatic invasion: Not identified.  Extrathyroidal extension: Not identified.  Regional lymph nodes:  -Number of lymph nodes involved: 0.  -Number of lymph nodes examined: 2 (incidental perithyroidal).  Pathologic Stage Classification: [id number].    She is currently on levothyroxine 137 µg and takes a multivitamin and calcium 2000 mg daily-TUMS-in review she does not wait 4 hr before she takes her calcium is usually within our  Postoperatively her calcium was low but subsequent calcium levels have been fine    Thyroglobulin levels have been  negative the once when her TSH was 55 her thyroglobulin was 8.1, at that time she was taking calcium along with her thyroid medication  She is on Ibrance and xgeva    Patient is a smoker and has COPD and has chronic dyspnea on exertion    She complains of a cough mostly at nighttime and has had some tiredness and fatigue      Also history of anxiety and depression and see psychiatry  She has been suffering from depression and having a hard time adjusting to her diagnosis but today she says she is doing okay-she has been having tiredness and fatigue and feeling cold    Denies swelling other than her chronic intermittent ankle swelling that has not worsened    In the past had  MRI findings showing metastasis in her spine and pelvis she has undergone radiation      Her last PET scan can showed the following:  PET scan 2017 that showed the followin. Interval thyroidectomy with some uptake noted in the region of the thyroid bed which is thought to be postsurgical in nature.     2.  Stable appearance of non-FDG avid osseus metastatic lesions involving the left ilium and manubrium.    I have reviewed the past medical, family and social history    Review of Systems   Constitutional: Positive for fatigue. Negative for appetite change, fever and unexpected weight change.   HENT: Negative for sore throat and trouble swallowing.    Eyes: Negative for visual disturbance.   Respiratory: Negative for shortness of breath and wheezing.    Cardiovascular: Negative for chest pain, palpitations and leg swelling.   Gastrointestinal: Negative for diarrhea, nausea and vomiting.   Endocrine: Negative for cold intolerance, heat intolerance, polydipsia, polyphagia and polyuria.   Genitourinary: Negative for difficulty urinating, dysuria and menstrual problem.   Musculoskeletal: Negative for arthralgias and joint swelling.   Skin: Negative for rash.   Neurological: Negative for dizziness, weakness, numbness and headaches.    Psychiatric/Behavioral: Negative for confusion, dysphoric mood and sleep disturbance.       Objective:      Physical Exam   Constitutional: No distress.   HENT:   Head: Normocephalic and atraumatic.   Eyes: Conjunctivae are normal.   Neck: No JVD present. No tracheal deviation present. No thyromegaly present.   Cardiovascular: Normal rate, regular rhythm, normal heart sounds and intact distal pulses. Exam reveals no gallop and no friction rub.   No murmur heard.  Pulmonary/Chest: Effort normal and breath sounds normal. No stridor. No respiratory distress. She has no wheezes. She has no rales. She exhibits no tenderness.   Musculoskeletal: She exhibits no edema or deformity.   Lymphadenopathy:     She has no cervical adenopathy.   Neurological: She is alert.   Skin: She is not diaphoretic.   Vitals reviewed.        Lab Review:   Lab Visit on 04/26/2019   Component Date Value    WBC 04/26/2019 3.92     RBC 04/26/2019 3.31*    Hemoglobin 04/26/2019 12.5     Hematocrit 04/26/2019 36.6*    Mean Corpuscular Volume 04/26/2019 111*    Mean Corpuscular Hemoglo* 04/26/2019 37.8*    Mean Corpuscular Hemoglo* 04/26/2019 34.2     RDW 04/26/2019 13.1     Platelets 04/26/2019 193     MPV 04/26/2019 10.8     Immature Granulocytes 04/26/2019 0.5     Gran # (ANC) 04/26/2019 2.2     Immature Grans (Abs) 04/26/2019 0.02     Lymph # 04/26/2019 1.2     Mono # 04/26/2019 0.4     Eos # 04/26/2019 0.1     Baso # 04/26/2019 0.05     nRBC 04/26/2019 0     Gran% 04/26/2019 56.6     Lymph% 04/26/2019 31.4     Mono% 04/26/2019 9.2     Eosinophil% 04/26/2019 1.5     Basophil% 04/26/2019 1.3     Differential Method 04/26/2019 Automated     Sodium 04/26/2019 140     Potassium 04/26/2019 3.8     Chloride 04/26/2019 106     CO2 04/26/2019 25     Glucose 04/26/2019 111*    BUN, Bld 04/26/2019 7     Creatinine 04/26/2019 1.2     Calcium 04/26/2019 9.6     Total Protein 04/26/2019 7.1     Albumin 04/26/2019 3.7      Total Bilirubin 04/26/2019 0.4     Alkaline Phosphatase 04/26/2019 51*    AST 04/26/2019 19     ALT 04/26/2019 19     Anion Gap 04/26/2019 9     eGFR if African American 04/26/2019 >60     eGFR if non  Amer* 04/26/2019 53*    Folate 04/26/2019 6.9     Vitamin B-12 04/26/2019 341    Lab Visit on 03/21/2019   Component Date Value    WBC 03/21/2019 3.05*    RBC 03/21/2019 3.37*    Hemoglobin 03/21/2019 12.4     Hematocrit 03/21/2019 37.2     Mean Corpuscular Volume 03/21/2019 110*    Mean Corpuscular Hemoglo* 03/21/2019 36.8*    Mean Corpuscular Hemoglo* 03/21/2019 33.3     RDW 03/21/2019 12.7     Platelets 03/21/2019 250     MPV 03/21/2019 10.1     Immature Granulocytes 03/21/2019 0.3     Gran # (ANC) 03/21/2019 1.5*    Immature Grans (Abs) 03/21/2019 0.01     Lymph # 03/21/2019 1.2     Mono # 03/21/2019 0.3     Eos # 03/21/2019 0.1     Baso # 03/21/2019 0.02     nRBC 03/21/2019 0     Gran% 03/21/2019 49.8     Lymph% 03/21/2019 37.7     Mono% 03/21/2019 9.2     Eosinophil% 03/21/2019 2.6     Basophil% 03/21/2019 0.7     Differential Method 03/21/2019 Automated     Sodium 03/21/2019 140     Potassium 03/21/2019 4.3     Chloride 03/21/2019 107     CO2 03/21/2019 24     Glucose 03/21/2019 107     BUN, Bld 03/21/2019 8     Creatinine 03/21/2019 1.2     Calcium 03/21/2019 9.4     Total Protein 03/21/2019 6.9     Albumin 03/21/2019 3.7     Total Bilirubin 03/21/2019 0.4     Alkaline Phosphatase 03/21/2019 55     AST 03/21/2019 16     ALT 03/21/2019 17     Anion Gap 03/21/2019 9     eGFR if African American 03/21/2019 >60     eGFR if non African Amer* 03/21/2019 53*     Lab Results   Component Value Date    TSH 2.106 12/12/2018       Assessment:     1. History of thyroid cancer  US Soft Tissue Head Neck Thyroid    TSH    T4, free    T3, free    Thyroglobulin   2. Postoperative hypothyroidism      though her most recent TSH was in the normal range given her history of  thyroid cancer though at low risk for recurrence I recommend keeping her TSH between 0.5 and to and her TSH was above this range in December so I will increase her Synthroid to 150 mcg and repeat her thyroid labs along with thyroglobulin in 6 weeks.  Also I encourage patient to space her calcium 4 hr from her thyroid.  She will get a neck ultrasound when she gets her lab tests in 6 weeks  Plan:   History of thyroid cancer  -     US Soft Tissue Head Neck Thyroid; Future; Expected date: 05/02/2019  -     TSH; Future; Expected date: 06/13/2019  -     T4, free; Future; Expected date: 06/13/2019  -     T3, free; Future; Expected date: 06/13/2019  -     Thyroglobulin; Future; Expected date: 06/13/2019    Postoperative hypothyroidism    Other orders  -     levothyroxine (SYNTHROID) 150 MCG tablet; Take 1 tablet (150 mcg total) by mouth once daily.  Dispense: 30 tablet; Refill: 5          Follow up in about 6 months (around 11/2/2019).    Labs prior to appointment? not applicable     Disclaimer:  This note may have been partially prepared using voice recognition software and  it may have not been extensively proofed, as such there could be errors within the text such as sound alike errors.

## 2019-05-06 DIAGNOSIS — F41.9 ANXIETY: ICD-10-CM

## 2019-05-06 RX ORDER — ALPRAZOLAM 0.5 MG/1
0.5 TABLET ORAL 2 TIMES DAILY PRN
Qty: 60 TABLET | Refills: 0 | Status: SHIPPED | OUTPATIENT
Start: 2019-05-06 | End: 2019-06-06 | Stop reason: SDUPTHER

## 2019-05-10 DIAGNOSIS — Z17.0 MALIGNANT NEOPLASM OF OVERLAPPING SITES OF RIGHT BREAST IN FEMALE, ESTROGEN RECEPTOR POSITIVE: ICD-10-CM

## 2019-05-10 DIAGNOSIS — C79.51 SECONDARY CANCER OF BONE: ICD-10-CM

## 2019-05-10 DIAGNOSIS — C50.811 MALIGNANT NEOPLASM OF OVERLAPPING SITES OF RIGHT BREAST IN FEMALE, ESTROGEN RECEPTOR POSITIVE: ICD-10-CM

## 2019-05-10 DIAGNOSIS — C77.3 MALIGNANT NEOPLASM METASTATIC TO LYMPH NODE OF AXILLA: ICD-10-CM

## 2019-05-10 DIAGNOSIS — G89.3 NEOPLASM RELATED PAIN: ICD-10-CM

## 2019-05-10 RX ORDER — HYDROCODONE BITARTRATE AND ACETAMINOPHEN 7.5; 325 MG/1; MG/1
1 TABLET ORAL EVERY 6 HOURS PRN
Qty: 120 TABLET | Refills: 0 | Status: SHIPPED | OUTPATIENT
Start: 2019-05-10 | End: 2019-06-10 | Stop reason: SDUPTHER

## 2019-05-24 ENCOUNTER — LAB VISIT (OUTPATIENT)
Dept: LAB | Facility: HOSPITAL | Age: 49
End: 2019-05-24
Attending: INTERNAL MEDICINE
Payer: MEDICAID

## 2019-05-24 ENCOUNTER — OFFICE VISIT (OUTPATIENT)
Dept: HEMATOLOGY/ONCOLOGY | Facility: CLINIC | Age: 49
End: 2019-05-24
Payer: MEDICAID

## 2019-05-24 VITALS
WEIGHT: 224.88 LBS | HEART RATE: 90 BPM | TEMPERATURE: 98 F | HEIGHT: 67 IN | RESPIRATION RATE: 18 BRPM | SYSTOLIC BLOOD PRESSURE: 129 MMHG | OXYGEN SATURATION: 98 % | DIASTOLIC BLOOD PRESSURE: 82 MMHG | BODY MASS INDEX: 35.29 KG/M2

## 2019-05-24 DIAGNOSIS — E53.8 VITAMIN B12 DEFICIENCY: ICD-10-CM

## 2019-05-24 DIAGNOSIS — G89.3 NEOPLASM RELATED PAIN: ICD-10-CM

## 2019-05-24 DIAGNOSIS — C50.811 MALIGNANT NEOPLASM OF OVERLAPPING SITES OF RIGHT BREAST IN FEMALE, ESTROGEN RECEPTOR POSITIVE: Primary | ICD-10-CM

## 2019-05-24 DIAGNOSIS — C79.51 SECONDARY CANCER OF BONE: ICD-10-CM

## 2019-05-24 DIAGNOSIS — C77.3 MALIGNANT NEOPLASM METASTATIC TO LYMPH NODE OF AXILLA: ICD-10-CM

## 2019-05-24 DIAGNOSIS — Z17.0 MALIGNANT NEOPLASM OF OVERLAPPING SITES OF RIGHT BREAST IN FEMALE, ESTROGEN RECEPTOR POSITIVE: Primary | ICD-10-CM

## 2019-05-24 DIAGNOSIS — Z17.0 MALIGNANT NEOPLASM OF OVERLAPPING SITES OF RIGHT BREAST IN FEMALE, ESTROGEN RECEPTOR POSITIVE: ICD-10-CM

## 2019-05-24 DIAGNOSIS — C50.811 MALIGNANT NEOPLASM OF OVERLAPPING SITES OF RIGHT BREAST IN FEMALE, ESTROGEN RECEPTOR POSITIVE: ICD-10-CM

## 2019-05-24 LAB
ALBUMIN SERPL BCP-MCNC: 3.8 G/DL (ref 3.5–5.2)
ALP SERPL-CCNC: 57 U/L (ref 55–135)
ALT SERPL W/O P-5'-P-CCNC: 28 U/L (ref 10–44)
ANION GAP SERPL CALC-SCNC: 10 MMOL/L (ref 8–16)
AST SERPL-CCNC: 20 U/L (ref 10–40)
BASOPHILS # BLD AUTO: 0.02 K/UL (ref 0–0.2)
BASOPHILS NFR BLD: 0.5 % (ref 0–1.9)
BILIRUB SERPL-MCNC: 0.4 MG/DL (ref 0.1–1)
BUN SERPL-MCNC: 12 MG/DL (ref 6–20)
CALCIUM SERPL-MCNC: 9.5 MG/DL (ref 8.7–10.5)
CHLORIDE SERPL-SCNC: 105 MMOL/L (ref 95–110)
CO2 SERPL-SCNC: 25 MMOL/L (ref 23–29)
CREAT SERPL-MCNC: 1.2 MG/DL (ref 0.5–1.4)
DIFFERENTIAL METHOD: ABNORMAL
EOSINOPHIL # BLD AUTO: 0.1 K/UL (ref 0–0.5)
EOSINOPHIL NFR BLD: 1.3 % (ref 0–8)
ERYTHROCYTE [DISTWIDTH] IN BLOOD BY AUTOMATED COUNT: 13.2 % (ref 11.5–14.5)
EST. GFR  (AFRICAN AMERICAN): >60 ML/MIN/1.73 M^2
EST. GFR  (NON AFRICAN AMERICAN): 53 ML/MIN/1.73 M^2
GLUCOSE SERPL-MCNC: 108 MG/DL (ref 70–110)
HCT VFR BLD AUTO: 36.3 % (ref 37–48.5)
HGB BLD-MCNC: 12.1 G/DL (ref 12–16)
IMM GRANULOCYTES # BLD AUTO: 0.03 K/UL (ref 0–0.04)
IMM GRANULOCYTES NFR BLD AUTO: 0.8 % (ref 0–0.5)
LYMPHOCYTES # BLD AUTO: 1.4 K/UL (ref 1–4.8)
LYMPHOCYTES NFR BLD: 35.6 % (ref 18–48)
MCH RBC QN AUTO: 36.7 PG (ref 27–31)
MCHC RBC AUTO-ENTMCNC: 33.3 G/DL (ref 32–36)
MCV RBC AUTO: 110 FL (ref 82–98)
MONOCYTES # BLD AUTO: 0.5 K/UL (ref 0.3–1)
MONOCYTES NFR BLD: 11.8 % (ref 4–15)
NEUTROPHILS # BLD AUTO: 2 K/UL (ref 1.8–7.7)
NEUTROPHILS NFR BLD: 50.8 % (ref 38–73)
NRBC BLD-RTO: 0 /100 WBC
PLATELET # BLD AUTO: 185 K/UL (ref 150–350)
PMV BLD AUTO: 10.8 FL (ref 9.2–12.9)
POTASSIUM SERPL-SCNC: 3.7 MMOL/L (ref 3.5–5.1)
PROT SERPL-MCNC: 7.3 G/DL (ref 6–8.4)
RBC # BLD AUTO: 3.3 M/UL (ref 4–5.4)
SODIUM SERPL-SCNC: 140 MMOL/L (ref 136–145)
WBC # BLD AUTO: 3.9 K/UL (ref 3.9–12.7)

## 2019-05-24 PROCEDURE — 85025 COMPLETE CBC W/AUTO DIFF WBC: CPT

## 2019-05-24 PROCEDURE — 99999 PR PBB SHADOW E&M-EST. PATIENT-LVL IV: ICD-10-PCS | Mod: PBBFAC,,, | Performed by: INTERNAL MEDICINE

## 2019-05-24 PROCEDURE — 99999 PR PBB SHADOW E&M-EST. PATIENT-LVL IV: CPT | Mod: PBBFAC,,, | Performed by: INTERNAL MEDICINE

## 2019-05-24 PROCEDURE — 80053 COMPREHEN METABOLIC PANEL: CPT

## 2019-05-24 PROCEDURE — 99214 OFFICE O/P EST MOD 30 MIN: CPT | Mod: PBBFAC | Performed by: INTERNAL MEDICINE

## 2019-05-24 PROCEDURE — 36415 COLL VENOUS BLD VENIPUNCTURE: CPT

## 2019-05-24 PROCEDURE — 99214 OFFICE O/P EST MOD 30 MIN: CPT | Mod: S$PBB,,, | Performed by: INTERNAL MEDICINE

## 2019-05-24 PROCEDURE — 99214 PR OFFICE/OUTPT VISIT, EST, LEVL IV, 30-39 MIN: ICD-10-PCS | Mod: S$PBB,,, | Performed by: INTERNAL MEDICINE

## 2019-05-24 NOTE — PROGRESS NOTES
Reason for visit: Right breast carcinoma  Date of Diagnosis: January 12, 2017    HPI:   The patient is a 48-year-old  female who presents to the hematology oncology clinic today for follow up for metastatic right breast carcinoma.    I have reviewed all of the patient's relevant clinical history available in the medical record and have utilized this in my evaluation and management recommendations today including the details of her recent visit to [facility] [place] [facility] on September 15, 2018.  The patient had 2 separate areas in the right breast that were biopsied area the first was a right breast mass at 11:00 which was 3 cm from the nipple which showed invasive mammary carcinoma.  The invasive carcinoma measured at least 1.2 cm in greatest dimension and appeared high-grade.  This tumor was ER positive/NM positive/HER-2 negative.  The second breast mass was biopsied from the retroperitoneal area lower area and was also positive for invasive mammary carcinoma.  This measured at least 0.4 cm and also appeared high-grade.  The tumor is morphologically very similar to the tumor in the prior specimen.  Receptor studies were not done on this specimen.  The patient also had a palpable right axillary lymph node which was biopsied and showed metastatic mammary carcinoma which measured at least 1.3 cm in greatest dimension.  The patient had self palpated this breast mass.  In addition the patient has a palpable mass in the region of her right elbow which she reports has been present for 9 years but had been slowly growing especially over the past year or 2.  She denies any pain associated with this.    She is also status post total thyroidectomy for papillary thyroid carcinoma.  Today the patient reports that she has been having a recurrent rash on her face.  This has improved with 2 courses of p.o. methylprednisolone as an outpatient but seems to be a recurrent issue in the past couple of  months.  She also reports feeling tired and fatigued.  She reports having trouble sleeping at night.  She reports chronic low  back pain.  She has been having chronic problems with anxiety and depression.  However she denies any suicidal or homicidal ideation.  She is taking norco PRN neoplasm related pain.  She denies any fevers or chills. Denies night sweats.  She denies any melena, hematochezia, hematemesis, hemoptysis or hematuria. She reports intermittent non productive cough.  She continues on letrozole. Ibrance was started on 2/21/17.     PAST MEDICAL HISTORY:   1. HSIL on pap smear s/p LEEP  2. Retinal hemorrhage in left eye    SURGICAL HISTORY:   1.  Tonsillectomy and adenoidectomy  2.  Appendectomy  3.  Cholecystectomy  4.  D&C  5.  Thyroidectomy on August 31, 2017  6.  Right axillary hydradenitis/cellulitis and left inguinal hydradenitis/cellulitis s/p debridement and skin grafting    FAMILY HISTORY: She reports that her maternal aunt had lung cancer in her 70s.  She used to smoke cigarettes.  Her maternal cousin had lung cancer in her 60s.  She used to smoke cigarettes.  She denies any other immediate family members with cancer or bleeding/clotting disorders.    SOCIAL HISTORY: She reports a 46+ pack year smoking history. She drinks wine occasionally.  She denies any history of recreational drug use.  She is engaged and has 2 sons.  She used to work for [employer].  She lives in [place], [place].     ALLERGIES: [NKDA]    MEDICATIONS: [Medcard has been reviewed and/or reconciled.]    REVIEW OF SYSTEMS:   GENERAL: [No fevers, chills or sweats. Reports fatigue. Denies weight loss. Denies loss of appetite.]  HEENT: [No blurred vision, tinnitus. Reports sinus discharge is improved. Reports sorethroat is improved. Denies dysphagia.]  HEART: [No chest pain, palpitations or shortness of breath.]    LUNGS: [Reports chronic cough. Denies hemoptysis or breathing problems.]  ABDOMEN: [No abdominal pain,  nausea, vomiting, diarrhea, constipation or melena.]  GENITOURINARY: [No dysuria, bleeding or malodorous discharge.]  NEURO: [Reports headache. Denies dizziness or vertigo.]  HEMATOLOGY: [No easy bruising, spontaneous bleeding or blood clots in the past].  MUSCULOSKELETAL: [Reports arthralgias, myalgias and bone pains.]  SKIN: [Reports rash on face.]  PSYCHIATRY: [Reports depression. Reports anxiety.]  Denies suicidal/homicidal ideation.    PHYSICAL EXAMINATION:   VS: Reviewed on nurse's notes.  APPEARANCE: The patient is a well-developed, well-nourished and well-groomed  female who appears in no acute distress.   HEENT: No scleral icterus. Both external auditory canals clear. No oral ulcers, lesions. Throat clear  HEAD: No sinus tenderness.  NECK: Supple. No palpable lymphadenopathy. Thyroid non-tender, no palpable masses  CHEST: Breath sounds clear bilaterally. No rales. No rhonchi. Unlabored respirations.  BREAST: Deferred today.  CARDIOVASCULAR: Normal S1, S2. Normal rate. Regular rhythm.  ABDOMEN: Bowel sounds normal. No tenderness. No abdominal distention. No hepatomegaly. No splenomegaly.  BACK: Palpable tenderness in the lower back.  LYMPHATIC: No palpable supraclavicular lymphadenopathy.   EXTREMITIES: No clubbing, cyanosis. Trace edema in bilateral lower extremities.      SKIN:  Mild urticarial skin rash noted on face   NEUROLOGIC: No focal findings. Alert & Oriented x 3. Mood appropriate to affect    LABS:   Reviewed    IMAGING:  Reviewed    IMPRESSION:  1.  Metastatic multifocal infiltrating ductal carcinoma of the right breast with right axillary and bone involvement [ER positive/[place] positive/HER-2 negative]  2.  Papillary thyroid carcinoma  3.  Tobacco abuse  4.  Anxiety and depression  5.  Vitamin B12 deficiency-improved  6.  Folic acid deficiency-improved    PLAN:  1.  I had a detailed discussion with the patient previously with regard to the diagnosis, prognosis and treatment options for  metastatic multifocal invasive ductal carcinoma of the right breast with right axillary and bone involvement.  2.  I have discussed that at this time her metastatic malignancy is potentially treatable but not curable.  3.  The patient was previously encouraged to quit smoking.  She expressed understanding.  Prescription for Xanax when necessary anxiety was previously given to the patient after full discussion of sedation precautions.    4. FSH and estradiol levels confirm menopausal status. TSH lab was normal.   5.  MRI of the brain on 1/31/17 to evaluate for any evidence of metastatic disease to complete staging workup was negative for metastatic malignancy to the brain. CT head done in March 2017 in ED was also ok.  6.  At this time I have recommended that she continue ibrance and continue letrozole as well.  7. She has been advised to take calcium and vit d supplementation everyday. She is on xgeva injections for treatment of bone involvement by malignancy. Risks/benefits and common side effects discussed and she expressed understanding and agreement. She has dentures.  She will get Xgeva today.  8. Continue Lasix PRN for treatment of bilateral lower extremity edema.  Continue supplemental potassium was given as well.  Advised to use compression stockings and elevate legs.  9.  She will continue follow up with with Dr. Jason with endocrinology for follow up of papillary thyroid carcinoma.    10.  Results of MRI of the thoracic and lumbar spine from 9/21/17 for evaluation of current acute symptoms related to back pain reviewed in detail with pateint. No acute process involving the spinal cord is noted. She does have bone involvement by malignancy in multiple areas. However the PET/CT done on 9/27/17 shows no evidence of new areas of bony or visceral involvement.  Hence there appears to be no evidence of metastatic disease progression.  However since the patient continues to have low back pain and MRI of lumbar  spine does show enhancing marrow replacing metastatic lesions are seen in the bilateral iliac bones posteriorly. She did see radiation oncology and completed palliative XRT to this location which helped significantly with her symptoms.    11.  The patient will continue follow up with Dr. Guerin with clinical psychology for her chronic anxiety and depression.  The patient has been advised to seek immediate medical attention if any suicidal/homicidal ideation.  She expressed understanding.  12. CT scans done in Feb 2019 shows that her metastatic breast cancer continues to be stable/well controlled. I will plan to recheck again in June 2019.    13. She will continue potassium supplementation as recommended.  14.  She will continue oral vitamin B12 supplementation for the rest of her life.  The rationale for this was reviewed in detail and she expressed understanding. I will follow up with pending labs.  15.  Refill for Celexa was given to the patient previously for treatment of her depression.  We discussed that this might also help her insomnia as her persistent insomnia might also be related to her depression.  She expressed understanding.  She will let me know if no improvement.    Return to clinic in 2 month with labs. She will be due for xgeva at follow up. She knows to call sooner for any new problems or questions.    Richard Delong MD

## 2019-05-27 ENCOUNTER — OFFICE VISIT (OUTPATIENT)
Dept: PULMONOLOGY | Facility: CLINIC | Age: 49
End: 2019-05-27
Payer: MEDICAID

## 2019-05-27 VITALS
BODY MASS INDEX: 35.41 KG/M2 | DIASTOLIC BLOOD PRESSURE: 70 MMHG | SYSTOLIC BLOOD PRESSURE: 124 MMHG | HEART RATE: 96 BPM | OXYGEN SATURATION: 98 % | RESPIRATION RATE: 18 BRPM | HEIGHT: 67 IN | WEIGHT: 225.63 LBS

## 2019-05-27 DIAGNOSIS — J30.89 SEASONAL ALLERGIC RHINITIS DUE TO OTHER ALLERGIC TRIGGER: ICD-10-CM

## 2019-05-27 DIAGNOSIS — F17.200 SMOKING: ICD-10-CM

## 2019-05-27 DIAGNOSIS — J43.1 PANLOBULAR EMPHYSEMA: Primary | ICD-10-CM

## 2019-05-27 DIAGNOSIS — J44.89 ASTHMA WITH COPD: ICD-10-CM

## 2019-05-27 DIAGNOSIS — R05.9 COUGH: ICD-10-CM

## 2019-05-27 DIAGNOSIS — G47.01 INSOMNIA DUE TO MEDICAL CONDITION: ICD-10-CM

## 2019-05-27 PROCEDURE — 99999 PR PBB SHADOW E&M-EST. PATIENT-LVL IV: CPT | Mod: PBBFAC,,, | Performed by: INTERNAL MEDICINE

## 2019-05-27 PROCEDURE — 99214 OFFICE O/P EST MOD 30 MIN: CPT | Mod: PBBFAC,25 | Performed by: INTERNAL MEDICINE

## 2019-05-27 PROCEDURE — 99215 OFFICE O/P EST HI 40 MIN: CPT | Mod: 25,S$PBB,, | Performed by: INTERNAL MEDICINE

## 2019-05-27 PROCEDURE — 94664 DEMO&/EVAL PT USE INHALER: CPT | Mod: S$PBB,,, | Performed by: INTERNAL MEDICINE

## 2019-05-27 PROCEDURE — 99215 PR OFFICE/OUTPT VISIT, EST, LEVL V, 40-54 MIN: ICD-10-PCS | Mod: 25,S$PBB,, | Performed by: INTERNAL MEDICINE

## 2019-05-27 PROCEDURE — 99999 PR PBB SHADOW E&M-EST. PATIENT-LVL IV: ICD-10-PCS | Mod: PBBFAC,,, | Performed by: INTERNAL MEDICINE

## 2019-05-27 PROCEDURE — 94640 AIRWAY INHALATION TREATMENT: CPT | Mod: PBBFAC

## 2019-05-27 PROCEDURE — 94664 PR DEMO &/OR EVAL,PT USE,AEROSOL DEVICE: ICD-10-PCS | Mod: S$PBB,,, | Performed by: INTERNAL MEDICINE

## 2019-05-27 RX ORDER — METHYLPREDNISOLONE 4 MG/1
TABLET ORAL
Qty: 1 PACKAGE | Refills: 1 | Status: SHIPPED | OUTPATIENT
Start: 2019-05-27 | End: 2019-07-23 | Stop reason: ALTCHOICE

## 2019-05-27 RX ORDER — ALBUTEROL SULFATE 0.83 MG/ML
2.5 SOLUTION RESPIRATORY (INHALATION)
Status: COMPLETED | OUTPATIENT
Start: 2019-05-27 | End: 2019-05-27

## 2019-05-27 RX ORDER — HYDROCODONE BITARTRATE AND HOMATROPINE METHYLBROMIDE ORAL SOLUTION 5; 1.5 MG/5ML; MG/5ML
5 LIQUID ORAL NIGHTLY PRN
Qty: 240 ML | Refills: 0 | Status: SHIPPED | OUTPATIENT
Start: 2019-05-27 | End: 2019-05-29 | Stop reason: CLARIF

## 2019-05-27 RX ORDER — AZITHROMYCIN 250 MG/1
TABLET, FILM COATED ORAL
Qty: 6 TABLET | Refills: 0 | Status: SHIPPED | OUTPATIENT
Start: 2019-05-27 | End: 2019-09-26 | Stop reason: ALTCHOICE

## 2019-05-27 RX ORDER — FLUTICASONE PROPIONATE 50 MCG
2 SPRAY, SUSPENSION (ML) NASAL DAILY
Qty: 1 BOTTLE | Refills: 11 | Status: SHIPPED | OUTPATIENT
Start: 2019-05-27 | End: 2020-05-26

## 2019-05-27 RX ORDER — ALBUTEROL SULFATE 0.83 MG/ML
2.5 SOLUTION RESPIRATORY (INHALATION)
Qty: 270 ML | Refills: 11 | Status: SHIPPED | OUTPATIENT
Start: 2019-05-27 | End: 2021-06-15

## 2019-05-27 RX ADMIN — ALBUTEROL SULFATE 2.5 MG: 2.5 SOLUTION INTRABRONCHIAL at 02:05

## 2019-05-27 NOTE — PROGRESS NOTES
Subjective:       Patient ID: Josey Flores is a 48 y.o. female.    Chief Complaint: She       COPD     HPI      Sleep with 3 pillows  Cough:  Patient complains of nasal congestion, nonproductive cough and rhinorrhea 4- 6 weeks. Symptoms began 6 weeks ago. Symptoms have been gradually worsening since that time.The cough is dry and is aggravated by dust and reclining position. Associated symptoms include: postnasal drip and shortness of breath. Patient does not have new pets. Patient does not have a history of asthma. Patient does not have a history of environmental allergens. Patient has not traveled recently. Patient does have a history of smoking. Patient has had a previous chest x-ray. Patient has not had a PPD done.    Past Medical History:   Diagnosis Date    Anxiety     Asthma     Cancer     right breast, metastatic-lymph nodes, bone, and thyroid     COPD (chronic obstructive pulmonary disease)     Depression     History of psychiatric hospitalization     2000; suicidal ideation    Hx of psychiatric care     Hypothyroidism     Miscarriage     five miscarriages    Obesity     Psychiatric problem     Thyroid cancer 2017     Past Surgical History:   Procedure Laterality Date    ABSCESS DRAINAGE      bilateral axilla    ADENOIDECTOMY      APPENDECTOMY      CHOLECYSTECTOMY      EXCHANGE-WOUND VAC Bilateral 5/19/2017    [facility]    EXCISION-HIDRADENITIS Bilateral 5/17/2017    [facility]    EXCISION-MASS-ARM Right 3/14/2018    [facility]    [id number] N/A 12/14/2016    [facility]    INCISION AND DRAINAGE (I & D)-GROIN Left 6/16/2017    [facility]    INCISION AND DRAINAGE-THIGH Right 6/16/2017    [facility]    MASS EXCISION      PLACEMENT-WOUND VAC Bilateral 5/17/2017     [facility]    SHAVING-ENDOMETRIAL  2016    [facility]    SKIN GRAFT  2017    SKIN GRAFT-SPLIT THICKNESS Left 2017    [facility]    THYROIDECTOMY Bilateral     THYROIDECTOMY Bilateral 2017    [facility]    TONSILLECTOMY       Social History     Socioeconomic History    Marital status:      Spouse name: Not on file    Number of children: 2    Years of education: Not on file    Highest education level: Not on file   Occupational History    Not on file   Social Needs    Financial resource strain: Not on file    Food insecurity:     Worry: Not on file     Inability: Not on file    Transportation needs:     Medical: Not on file     Non-medical: Not on file   Tobacco Use    Smoking status: Current Every Day Smoker     Packs/day: 1.00     Years: 32.00     Pack years: 32.00     Types: Cigarettes     Start date: 1985     Last attempt to quit: 2017     Years since quittin.9    Smokeless tobacco: Never Used    Tobacco comment: 45 pack year history   Substance and Sexual Activity    Alcohol use: No    Drug use: No    Sexual activity: Yes     Partners: Male   Lifestyle    Physical activity:     Days per week: Not on file     Minutes per session: Not on file    Stress: Not on file   Relationships    Social connections:     Talks on phone: Not on file     Gets together: Not on file     Attends Cheondoism service: Not on file     Active member of club or organization: Not on file     Attends meetings of clubs or organizations: Not on file     Relationship status: Not on file   Other Topics Concern    Patient feels they ought to cut down on drinking/drug use Not Asked    Patient annoyed by others criticizing their drinking/drug use Not Asked    Patient has felt bad or guilty about drinking/drug use Not Asked    Patient has had a drink/used  "drugs as an eye opener in the AM Not Asked   Social History Narrative     with two adult children; common law marriage (10+years); raised [employer], no current [employer] practice     Review of Systems   Constitutional: Positive for fatigue. Negative for fever.   HENT: Positive for postnasal drip, rhinorrhea and congestion.    Eyes: Negative for redness and itching.   Respiratory: Positive for cough, sputum production, shortness of breath, dyspnea on extertion, use of rescue inhaler and Paroxysmal Nocturnal Dyspnea.    Cardiovascular: Negative for chest pain, palpitations and leg swelling.   Genitourinary: Negative for difficulty urinating and hematuria.   Endocrine: Negative for cold intolerance and heat intolerance.    Skin: Negative for rash.   Gastrointestinal: Negative for nausea and abdominal pain.   Neurological: Negative for dizziness, syncope, weakness and light-headedness.   Hematological: Negative for adenopathy. Does not bruise/bleed easily.   Psychiatric/Behavioral: Negative for sleep disturbance. The patient is not nervous/anxious.        Objective:      /70   Pulse 96   Resp 18   Ht 5' 7" (1.702 m)   Wt 102.3 kg (225 lb 10.3 oz)   LMP  (LMP Unknown) Comment: no cycle x3 years  SpO2 98%   BMI 35.34 kg/m²   Physical Exam   Constitutional: She is oriented to person, place, and time. She appears well-developed and well-nourished.   HENT:   Head: Normocephalic and atraumatic.   Mouth/Throat: Oropharyngeal exudate present.   Eyes: Pupils are equal, round, and reactive to light. Conjunctivae are normal.   Neck: Neck supple. No JVD present. No tracheal deviation present. No thyromegaly present.   Cardiovascular: Normal rate, regular rhythm and normal heart sounds.   Pulmonary/Chest: Effort normal. No respiratory distress. She has decreased breath sounds. She has wheezes in the right lower field and the left lower field. She has no rhonchi. She has no rales. She exhibits no tenderness. "   Abdominal: Soft. Bowel sounds are normal.   Musculoskeletal: Normal range of motion. She exhibits no edema.   Lymphadenopathy:     She has no cervical adenopathy.   Neurological: She is alert and oriented to person, place, and time.   Skin: Skin is warm and dry.   Nursing note and vitals reviewed.    Personal Diagnostic Review  Chest x-ray: Stabale  CT Chest Abdoment Pelvis With Contrast  Narrative: EXAMINATION:  CT CHEST ABDOMEN PELVIS WITH CONTRAST (XPD)    CLINICAL HISTORY:  metastatic breast cancer; Malignant neoplasm of overlapping sites of right female breast    TECHNIQUE:  Low dose axial images, sagittal and coronal reformations were obtained from the thoracic inlet to the pubic symphysis following the IV administration of 75 mL of Omnipaque 350 and the oral administration of 30 ml of Omnipaque 350.    COMPARISON:  CTA chest dated 12/08/2018, CT abdomen and pelvis dated 09/15/2018    FINDINGS:  Chest:    Heart and great vessels: Within normal limits.    Adenopathy: Multiple calcified mediastinal and right hilar lymph nodes are again noted.  Otherwise no thoracic adenopathy.    Lungs: Noncalcified solid subpleural pulmonary nodule in the periphery of the right upper lobe remains unchanged in size measuring approximately 5 mm as seen on axial image 81, series 4.  Multiple clustered calcified granulomas also noted in the periphery of the right lower lobe.  No new pulmonary nodules demonstrated.  No parenchymal consolidations or pleural effusions.    Abdomen:    Liver: Within normal limits.    Gallbladder and biliary: Prior cholecystectomy noted.    Spleen: Within normal limits.    Pancreas: Within normal limits.    Adrenals: Within normal limits.    Kidneys: Within normal limits.    Bowel: Within normal limits.  No evidence of obstruction.    Peritoneum: No ascites or pneumoperitoneum.    Abdominal Adenopathy: None.    Vasculature: Within normal limits.    Pelvis:    Urinary bladder: Unremarkable.    Uterus  and adnexa: Grossly within normal limits    Pelvis adenopathy: None.    Bones: There is a stable 2 cm lytic lesion present in the left iliac bone near the SI joint.  Additional 8 mm lytic lesion in the left iliac wing is also unchanged.  No acute osseous findings.    Miscellaneous: None.  Impression: 1. Stable exam with unchanged appearance of noncalcified right upper lobe pulmonary nodule  2. Multiple lytic foci within the left iliac are also unchanged.    Electronically signed by: Braeden Bernard MD  Date:    02/18/2019  Time:    14:35      Office Spirometry Results:     No flowsheet data found.  Pulmonary Studies Review 5/27/2019   SpO2 98   Peak Flow -   Height 67.000   Weight 3610.25   BMI (Calculated) 35.4   Predicted Distance 365.6   Predicted Distance Meters (Calculated) 502.74         Assessment:       Panlobular emphysema  -     Discontinue: hydrocodone-homatropine 5-1.5 mg/5 ml (HYCODAN) 5-1.5 mg/5 mL Syrp; Take 5 mLs by mouth nightly as needed (cough).  Dispense: 240 mL; Refill: 0  -     Spirometry without Bronchodilator; Future; Expected date: 05/27/2019    Cough  -     Discontinue: hydrocodone-homatropine 5-1.5 mg/5 ml (HYCODAN) 5-1.5 mg/5 mL Syrp; Take 5 mLs by mouth nightly as needed (cough).  Dispense: 240 mL; Refill: 0  -     methylPREDNISolone (MEDROL DOSEPACK) 4 mg tablet; use as directed  Dispense: 1 Package; Refill: 1  -     azithromycin (ZITHROMAX Z-СЕРГЕЙ) 250 MG tablet; 500 mg on day 1 (two tablets) followed by 250 mg once daily on days 2-5  Dispense: 6 tablet; Refill: 0    Asthma with COPD  -     albuterol (PROVENTIL) 2.5 mg /3 mL (0.083 %) nebulizer solution; Take 3 mLs (2.5 mg total) by nebulization every 6 (six) hours while awake.  Dispense: 270 mL; Refill: 11  -     NEBULIZER FOR HOME USE  -     NEBULIZER KIT (SUPPLIES) FOR HOME USE  -     albuterol nebulizer solution 2.5 mg    Smoking  -     Ambulatory referral to Smoking Cessation Program    Seasonal allergic rhinitis due to other  allergic trigger  -     fluticasone propionate (FLONASE) 50 mcg/actuation nasal spray; 2 sprays (100 mcg total) by Each Nare route once daily.  Dispense: 1 Bottle; Refill: 11    Insomnia due to medical condition          Outpatient Encounter Medications as of 5/27/2019   Medication Sig Dispense Refill    ALPRAZolam (XANAX) 0.5 MG tablet Take 1 tablet (0.5 mg total) by mouth 2 (two) times daily as needed for Insomnia or Anxiety. 60 tablet 0    benzonatate (TESSALON PERLES) 100 MG capsule Take 1 capsule (100 mg total) by mouth every 6 (six) hours as needed for Cough. 30 capsule 1    calcium carbonate 400 mg calcium (1,000 mg) Chew Take 2,000 mg by mouth once daily.      citalopram (CELEXA) 20 MG tablet Take 1 tablet (20 mg total) by mouth every evening. 90 tablet 1    cyanocobalamin (VITAMIN B-12) 1000 MCG tablet Take 1 tablet (1,000 mcg total) by mouth once daily. 90 tablet 3    ergocalciferol (VITAMIN D2) 50,000 unit Cap Take 5,000 Units by mouth once daily at 6am.       folic acid (FOLVITE) 1 MG tablet Take 1 tablet (1 mg total) by mouth once daily. 90 tablet 1    HYDROcodone-acetaminophen (NORCO) 7.5-325 mg per tablet Take 1 tablet by mouth every 6 (six) hours as needed for Pain. 120 tablet 0    letrozole (FEMARA) 2.5 mg Tab TAKE 1 TABLET BY MOUTH ONCE DAILY 90 tablet 1    levothyroxine (SYNTHROID) 150 MCG tablet Take 1 tablet (150 mcg total) by mouth once daily. 30 tablet 5    multivitamin (THERAGRAN) per tablet Take 1 tablet by mouth once daily.      palbociclib (IBRANCE) 125 mg Cap Take 125 mg by mouth once daily For 21 days followed by 7 days off. 21 capsule 5    albuterol (PROVENTIL) 2.5 mg /3 mL (0.083 %) nebulizer solution Take 3 mLs (2.5 mg total) by nebulization every 6 (six) hours while awake. 270 mL 11    albuterol 90 mcg/actuation inhaler Inhale 2 puffs into the lungs every 6 (six) hours. 1 Inhaler 12    azithromycin (ZITHROMAX Z-СЕРГЕЙ) 250 MG tablet 500 mg on day 1 (two tablets) followed  by 250 mg once daily on days 2-5 6 tablet 0    fluticasone propionate (FLONASE) 50 mcg/actuation nasal spray 2 sprays (100 mcg total) by Each Nare route once daily. 1 Bottle 11    methylPREDNISolone (MEDROL DOSEPACK) 4 mg tablet use as directed 1 Package 1    [DISCONTINUED] hydrocodone-homatropine 5-1.5 mg/5 ml (HYCODAN) 5-1.5 mg/5 mL Syrp Take 5 mLs by mouth nightly as needed (cough). 240 mL 0    [] albuterol nebulizer solution 2.5 mg        No facility-administered encounter medications on file as of 2019.      Plan:       Requested Prescriptions     Signed Prescriptions Disp Refills    albuterol (PROVENTIL) 2.5 mg /3 mL (0.083 %) nebulizer solution 270 mL 11     Sig: Take 3 mLs (2.5 mg total) by nebulization every 6 (six) hours while awake.    methylPREDNISolone (MEDROL DOSEPACK) 4 mg tablet 1 Package 1     Sig: use as directed    azithromycin (ZITHROMAX Z-СЕРГЕЙ) 250 MG tablet 6 tablet 0     Si mg on day 1 (two tablets) followed by 250 mg once daily on days 2-5    fluticasone propionate (FLONASE) 50 mcg/actuation nasal spray 1 Bottle 11     Si sprays (100 mcg total) by Each Nare route once daily.     Problem List Items Addressed This Visit     Chronic obstructive pulmonary disease - Primary    Relevant Orders    Spirometry without Bronchodilator    Insomnia    Smoking    Relevant Orders    Ambulatory referral to Smoking Cessation Program      Other Visit Diagnoses     Cough        Relevant Medications    methylPREDNISolone (MEDROL DOSEPACK) 4 mg tablet    azithromycin (ZITHROMAX Z-СЕРГЕЙ) 250 MG tablet    Asthma with COPD        Relevant Medications    albuterol (PROVENTIL) 2.5 mg /3 mL (0.083 %) nebulizer solution    albuterol nebulizer solution 2.5 mg (Completed)    Other Relevant Orders    NEBULIZER FOR HOME USE    NEBULIZER KIT (SUPPLIES) FOR HOME USE    Seasonal allergic rhinitis due to other allergic trigger        Relevant Medications    fluticasone propionate (FLONASE) 50  mcg/actuation nasal spray      Patient was given a jet nebulization treatment with albuterol. The patient was instructed on the proper use of nebulizer machine and given nebulizer set up device. Side effects of medication discussed.  Patient voiced understanding.   CPT code 58985         Follow up in about 6 weeks (around 7/8/2019) for Review dago, Neb Tx Demo today.    MEDICAL DECISION MAKING: Moderate to high complexity.  Overall, the multiple problems listed are of moderate to high severity that may impact quality of life and activities of daily living. Side effects of medications, treatment plan as well as options and alternatives reviewed and discussed with patient. There was counseling of patient concerning these issues.    Total time spent in face to face counseling and coordination of care - 45  minutes over 50% of time was used in discussion of prognosis, risks, benefits of treatment, instructions and compliance with regimen . Discussion with other physicians or health care providers (DME, NP, pharmacy, respiratory therapy) occurred.

## 2019-05-29 ENCOUNTER — TELEPHONE (OUTPATIENT)
Dept: PULMONOLOGY | Facility: CLINIC | Age: 49
End: 2019-05-29

## 2019-05-29 DIAGNOSIS — R05.9 COUGH: Primary | ICD-10-CM

## 2019-05-29 RX ORDER — PROMETHAZINE HYDROCHLORIDE AND DEXTROMETHORPHAN HYDROBROMIDE 6.25; 15 MG/5ML; MG/5ML
5 SYRUP ORAL 4 TIMES DAILY PRN
Qty: 240 ML | Refills: 5 | Status: SHIPPED | OUTPATIENT
Start: 2019-05-29 | End: 2019-06-08

## 2019-05-29 NOTE — SUBJECTIVE & OBJECTIVE
Interval History: improved condition with wound VAC.    Medications:  Continuous Infusions:   lactated ringers 75 mL/hr at 05/20/17 0605     Scheduled Meds:   citalopram  20 mg Oral Daily    enoxparin  40 mg Subcutaneous Q24H    [START ON 5/23/2017] ergocalciferol  50,000 Units Oral Q7 Days    linezolid 600mg/300ml  600 mg Intravenous Q12H    multivitamin  1 tablet Oral Daily    pantoprazole  40 mg Oral Daily     PRN Meds:albuterol sulfate, alprazolam, aluminum-magnesium hydroxide-simethicone, dextromethorphan-guaifenesin  mg/5 ml, dextrose 50%, dextrose 50%, diphenhydrAMINE, glucagon (human recombinant), glucose, glucose, metoclopramide HCl, morphine, ondansetron, ondansetron, oxycodone-acetaminophen, oxycodone-acetaminophen, sodium chloride 0.9%, sodium chloride 0.9%     Review of patient's allergies indicates:   Allergen Reactions    Vancomycin analogues Itching     Objective:     Vital Signs (Most Recent):  Temp: 98.8 °F (37.1 °C) (05/20/17 0724)  Pulse: 77 (05/20/17 0724)  Resp: 18 (05/20/17 0724)  BP: 120/67 (05/20/17 0724)  SpO2: 95 % (05/20/17 0724) Vital Signs (24h Range):  Temp:  [97.6 °F (36.4 °C)-99 °F (37.2 °C)] 98.8 °F (37.1 °C)  Pulse:  [67-86] 77  Resp:  [16-20] 18  SpO2:  [93 %-100 %] 95 %  BP: (107-182)/(54-77) 120/67     Weight: 93.9 kg (207 lb)  Body mass index is 32.42 kg/(m^2).    Intake/Output - Last 3 Shifts       05/18 0700 - 05/19 0659 05/19 0700 - 05/20 0659 05/20 0700 - 05/21 0659    P.O. 1080  360    I.V. (mL/kg)  4215.8 (44.9)     IV Piggyback 600 600     Total Intake(mL/kg) 1680 (17.9) 4815.8 (51.3) 360 (3.8)    Urine (mL/kg/hr) 0 (0) 0 (0)     Drains  0 (0)     Other 25 (0)      Stool       Blood  10 (0)     Total Output 25 10      Net +1655 +4805.8 +360           Urine Occurrence 9 x 8 x 1 x    Stool Occurrence 1 x  0 x          Physical Exam   Constitutional: She is oriented to person, place, and time. She appears well-developed and well-nourished.   HENT:   Head:  Normocephalic.   Right Ear: External ear normal.   Left Ear: External ear normal.   Nose: Nose normal.   Eyes: Pupils are equal, round, and reactive to light. No scleral icterus.   Neck: Normal range of motion. Neck supple. No thyromegaly present.   Cardiovascular: Normal rate, regular rhythm and normal heart sounds.    No murmur heard.  Pulmonary/Chest: Effort normal and breath sounds normal.   Abdominal: Soft. Bowel sounds are normal. There is no tenderness. There is no guarding.   Musculoskeletal: Normal range of motion.   Lymphadenopathy:     She has no cervical adenopathy.   Neurological: She is alert and oriented to person, place, and time.   Skin: Skin is warm and dry.       Significant Labs:  CBC:   Recent Labs  Lab 05/20/17  0709   WBC 6.61   RBC 2.94*   HGB 10.3*   HCT 30.5*      *   MCH 35.0*   MCHC 33.8     CMP:   Recent Labs  Lab 05/20/17  0709   *   CALCIUM 8.3*   ALBUMIN 2.9*   PROT 6.3      K 4.1   CO2 25      BUN 5*   CREATININE 0.8   ALKPHOS 84   ALT 60*   AST 21   BILITOT 0.2       Significant Diagnostics:  I have reviewed and interpreted all pertinent imaging results/findings within the past 24 hours.   I have personally seen and examined this patient.  I have fully participated in the care of this patient. I have reviewed all pertinent clinical information, including history, physical exam, plan and the Resident’s note and agree except as noted. I have personally performed a face to face diagnostic evaluation on this patient. I have reviewed the ACP note and agree with the history, exam and plan of care, except as noted.

## 2019-05-29 NOTE — TELEPHONE ENCOUNTER
----- Message from Shannon Arora LPN sent at 5/28/2019  2:24 PM CDT -----  Contact: [facility]      ----- Message -----  From: Pau Espinal  Sent: 5/28/2019   8:47 AM  To: Rachid COOK Staff    Type:  Pharmacy Calling to Clarify an RX    Name of Caller:[facility]  [facility]  Prescription Name:hycodan cough syrup  What do they need to clarify?:pt already has narco rx from another prescription and wants to know if she still gets the cough syrup. Also pharmacy van only fill for 7 days and its written for a longer supply time  Best Call Back Number:[phone]  Additional Information: none      Thanks,  Pau Espinal

## 2019-06-04 ENCOUNTER — TELEPHONE (OUTPATIENT)
Dept: PULMONOLOGY | Facility: CLINIC | Age: 49
End: 2019-06-04

## 2019-06-04 NOTE — TELEPHONE ENCOUNTER
Attempted to return call no answer LVM        ----- Message from Slade Machado sent at 6/4/2019 12:43 PM CDT -----  ..Type:  Patient Returning Call    Who Called:pt   Who Left Message for Patient:  Does the patient know what this is regarding?: inhaler   Would the patient rather a call back or a response via [facility]? Call back   Best Call Back Number: [phone]  Additional Information: pt is requesting a prescription or an inhaler. Pt states she cant use the machine without the inhaler.    .  [facility] [address] [place] [address] [id number] [place] [id number]  Phone: [phone] Fax: [phone]

## 2019-06-04 NOTE — TELEPHONE ENCOUNTER
Returned patient call and explained nebulizer usage and medication. Patient stated understanding        ----- Message from Rosedno Najera sent at 6/4/2019  3:09 PM CDT -----  Contact: Pt  .Type:  Patient Returning Call    Who Called: Pt  Who Left Message for Patient: Nurse   Does the patient know what this is regarding?: return call   Would the patient rather a call back or a response via [facility]? Call back  Best Call Back Number: .[phone] (home)   Additional Information:

## 2019-06-05 ENCOUNTER — TELEPHONE (OUTPATIENT)
Dept: SMOKING CESSATION | Facility: CLINIC | Age: 49
End: 2019-06-05

## 2019-06-06 DIAGNOSIS — F41.9 ANXIETY: ICD-10-CM

## 2019-06-06 RX ORDER — ALPRAZOLAM 0.5 MG/1
0.5 TABLET ORAL 2 TIMES DAILY PRN
Qty: 60 TABLET | Refills: 0 | Status: SHIPPED | OUTPATIENT
Start: 2019-06-06 | End: 2019-07-05 | Stop reason: SDUPTHER

## 2019-06-10 ENCOUNTER — PATIENT MESSAGE (OUTPATIENT)
Dept: PULMONOLOGY | Facility: CLINIC | Age: 49
End: 2019-06-10

## 2019-06-10 ENCOUNTER — PATIENT MESSAGE (OUTPATIENT)
Dept: HEMATOLOGY/ONCOLOGY | Facility: CLINIC | Age: 49
End: 2019-06-10

## 2019-06-10 DIAGNOSIS — C77.3 MALIGNANT NEOPLASM METASTATIC TO LYMPH NODE OF AXILLA: ICD-10-CM

## 2019-06-10 DIAGNOSIS — C79.51 SECONDARY CANCER OF BONE: ICD-10-CM

## 2019-06-10 DIAGNOSIS — G89.3 NEOPLASM RELATED PAIN: ICD-10-CM

## 2019-06-10 DIAGNOSIS — C50.811 MALIGNANT NEOPLASM OF OVERLAPPING SITES OF RIGHT BREAST IN FEMALE, ESTROGEN RECEPTOR POSITIVE: ICD-10-CM

## 2019-06-10 DIAGNOSIS — Z17.0 MALIGNANT NEOPLASM OF OVERLAPPING SITES OF RIGHT BREAST IN FEMALE, ESTROGEN RECEPTOR POSITIVE: ICD-10-CM

## 2019-06-10 RX ORDER — HYDROCODONE BITARTRATE AND ACETAMINOPHEN 7.5; 325 MG/1; MG/1
1 TABLET ORAL EVERY 6 HOURS PRN
Qty: 120 TABLET | Refills: 0 | Status: CANCELLED | OUTPATIENT
Start: 2019-06-10

## 2019-06-10 RX ORDER — HYDROCODONE BITARTRATE AND ACETAMINOPHEN 7.5; 325 MG/1; MG/1
1 TABLET ORAL EVERY 6 HOURS PRN
Qty: 120 TABLET | Refills: 0 | Status: SHIPPED | OUTPATIENT
Start: 2019-06-10 | End: 2019-07-09 | Stop reason: SDUPTHER

## 2019-06-13 ENCOUNTER — HOSPITAL ENCOUNTER (OUTPATIENT)
Dept: RADIOLOGY | Facility: HOSPITAL | Age: 49
Discharge: HOME | End: 2019-06-13
Attending: INTERNAL MEDICINE
Payer: MEDICAID

## 2019-06-13 ENCOUNTER — TELEPHONE (OUTPATIENT)
Dept: SMOKING CESSATION | Facility: CLINIC | Age: 49
End: 2019-06-13

## 2019-06-13 DIAGNOSIS — Z85.850 HISTORY OF THYROID CANCER: ICD-10-CM

## 2019-06-13 PROCEDURE — 76536 US SOFT TISSUE HEAD NECK THYROID: ICD-10-PCS | Mod: 26,,, | Performed by: RADIOLOGY

## 2019-06-13 PROCEDURE — 76536 US EXAM OF HEAD AND NECK: CPT | Mod: 26,,, | Performed by: RADIOLOGY

## 2019-06-13 PROCEDURE — 76536 US EXAM OF HEAD AND NECK: CPT | Mod: TC

## 2019-06-17 ENCOUNTER — CLINICAL SUPPORT (OUTPATIENT)
Dept: SMOKING CESSATION | Facility: CLINIC | Age: 49
End: 2019-06-17
Payer: COMMERCIAL

## 2019-06-17 DIAGNOSIS — F17.210 HEAVY SMOKER (MORE THAN 20 CIGARETTES PER DAY): Primary | ICD-10-CM

## 2019-06-17 PROCEDURE — 99404 PR PREVENT COUNSEL,INDIV,60 MIN: ICD-10-PCS | Mod: S$GLB,,,

## 2019-06-17 PROCEDURE — 99999 PR PBB SHADOW E&M-EST. PATIENT-LVL I: ICD-10-PCS | Mod: PBBFAC,,,

## 2019-06-17 PROCEDURE — 99999 PR PBB SHADOW E&M-EST. PATIENT-LVL I: CPT | Mod: PBBFAC,,,

## 2019-06-17 PROCEDURE — 99404 PREV MED CNSL INDIV APPRX 60: CPT | Mod: S$GLB,,,

## 2019-06-17 RX ORDER — IBUPROFEN 200 MG
1 TABLET ORAL DAILY
Qty: 28 PATCH | Refills: 0 | Status: SHIPPED | OUTPATIENT
Start: 2019-06-17 | End: 2019-06-25 | Stop reason: SDUPTHER

## 2019-06-17 NOTE — PROGRESS NOTES
1st time in program. She had tried patches before and gave her a headache about 20 years ago. She know she needs to quit with the spot on her lung, but not totally engaged. Recently diagnoses with COPD and does want to breathe better. Discussed Chantix, but explained need to be engaged to quit smoking for the Chantix to work. Will try 21 mg nicotine patch first, if not successful will start Chantix. Given smoking diary to keep. Smoking 22-25 cigarettes/day of off brand. The patient will continue individual sessions and medication monitoring by CTTS. Prescribed medication management will be by practitoner.

## 2019-06-20 ENCOUNTER — PATIENT MESSAGE (OUTPATIENT)
Dept: ENDOCRINOLOGY | Facility: CLINIC | Age: 49
End: 2019-06-20

## 2019-06-25 ENCOUNTER — TELEPHONE (OUTPATIENT)
Dept: SMOKING CESSATION | Facility: CLINIC | Age: 49
End: 2019-06-25

## 2019-06-25 DIAGNOSIS — F17.210 HEAVY SMOKER (MORE THAN 20 CIGARETTES PER DAY): ICD-10-CM

## 2019-06-25 RX ORDER — IBUPROFEN 200 MG
1 TABLET ORAL DAILY
Qty: 28 PATCH | Refills: 0 | Status: SHIPPED | OUTPATIENT
Start: 2019-06-25 | End: 2020-04-22

## 2019-06-25 NOTE — TELEPHONE ENCOUNTER
Had left message for patient that would be out of town during the first week in July. She had left message she could not come today. Friend had called back and stated she has cut back some and stated she had left a message. Explained will reorder medication so she would have it when needed. Next appt would be 7/9/19 @ 1500.

## 2019-06-29 DIAGNOSIS — Z17.0 MALIGNANT NEOPLASM OF OVERLAPPING SITES OF RIGHT BREAST IN FEMALE, ESTROGEN RECEPTOR POSITIVE: ICD-10-CM

## 2019-06-29 DIAGNOSIS — G89.3 NEOPLASM RELATED PAIN: ICD-10-CM

## 2019-06-29 DIAGNOSIS — C77.3 MALIGNANT NEOPLASM METASTATIC TO LYMPH NODE OF AXILLA: ICD-10-CM

## 2019-06-29 DIAGNOSIS — C50.811 MALIGNANT NEOPLASM OF OVERLAPPING SITES OF RIGHT BREAST IN FEMALE, ESTROGEN RECEPTOR POSITIVE: ICD-10-CM

## 2019-06-29 DIAGNOSIS — F41.9 ANXIETY: ICD-10-CM

## 2019-06-29 DIAGNOSIS — F32.A DEPRESSION, UNSPECIFIED DEPRESSION TYPE: ICD-10-CM

## 2019-06-29 DIAGNOSIS — C79.51 SECONDARY CANCER OF BONE: ICD-10-CM

## 2019-06-30 RX ORDER — LETROZOLE 2.5 MG/1
TABLET, FILM COATED ORAL
Qty: 90 TABLET | Refills: 1 | Status: SHIPPED | OUTPATIENT
Start: 2019-06-30 | End: 2019-12-02 | Stop reason: SDUPTHER

## 2019-07-05 DIAGNOSIS — F41.9 ANXIETY: ICD-10-CM

## 2019-07-05 RX ORDER — ALPRAZOLAM 0.5 MG/1
0.5 TABLET ORAL 2 TIMES DAILY PRN
Qty: 60 TABLET | Refills: 0 | Status: SHIPPED | OUTPATIENT
Start: 2019-07-05 | End: 2019-08-05 | Stop reason: SDUPTHER

## 2019-07-09 DIAGNOSIS — Z17.0 MALIGNANT NEOPLASM OF OVERLAPPING SITES OF RIGHT BREAST IN FEMALE, ESTROGEN RECEPTOR POSITIVE: ICD-10-CM

## 2019-07-09 DIAGNOSIS — C50.811 MALIGNANT NEOPLASM OF OVERLAPPING SITES OF RIGHT BREAST IN FEMALE, ESTROGEN RECEPTOR POSITIVE: ICD-10-CM

## 2019-07-09 DIAGNOSIS — C77.3 MALIGNANT NEOPLASM METASTATIC TO LYMPH NODE OF AXILLA: ICD-10-CM

## 2019-07-09 DIAGNOSIS — G89.3 NEOPLASM RELATED PAIN: ICD-10-CM

## 2019-07-09 DIAGNOSIS — C79.51 SECONDARY CANCER OF BONE: ICD-10-CM

## 2019-07-10 RX ORDER — HYDROCODONE BITARTRATE AND ACETAMINOPHEN 7.5; 325 MG/1; MG/1
1 TABLET ORAL EVERY 6 HOURS PRN
Qty: 120 TABLET | Refills: 0 | OUTPATIENT
Start: 2019-07-10

## 2019-07-10 RX ORDER — HYDROCODONE BITARTRATE AND ACETAMINOPHEN 7.5; 325 MG/1; MG/1
1 TABLET ORAL EVERY 6 HOURS PRN
Qty: 120 TABLET | Refills: 0 | Status: SHIPPED | OUTPATIENT
Start: 2019-07-10 | End: 2019-08-09 | Stop reason: SDUPTHER

## 2019-07-15 ENCOUNTER — TELEPHONE (OUTPATIENT)
Dept: SMOKING CESSATION | Facility: CLINIC | Age: 49
End: 2019-07-15

## 2019-07-15 NOTE — TELEPHONE ENCOUNTER
Spoke with  and daughter and explained would like update on smoking. He stated she is smoking 2 packs per day with the 21 mg nicotine patch on. He states she has not slowed down. Requested to call [phone] to get update and to see if she would like to try the Chantix if she is ready to engage.

## 2019-07-22 ENCOUNTER — CLINICAL SUPPORT (OUTPATIENT)
Dept: SMOKING CESSATION | Facility: CLINIC | Age: 49
End: 2019-07-22
Payer: COMMERCIAL

## 2019-07-22 DIAGNOSIS — F17.210 HEAVY SMOKER (MORE THAN 20 CIGARETTES PER DAY): Primary | ICD-10-CM

## 2019-07-22 PROCEDURE — 99407 BEHAV CHNG SMOKING > 10 MIN: CPT | Mod: S$GLB,,,

## 2019-07-22 PROCEDURE — 99407 PR TOBACCO USE CESSATION INTENSIVE >10 MINUTES: ICD-10-PCS | Mod: S$GLB,,,

## 2019-07-22 RX ORDER — VARENICLINE TARTRATE 0.5 (11)-1
KIT ORAL
Qty: 53 TABLET | Refills: 0 | Status: SHIPPED | OUTPATIENT
Start: 2019-07-22 | End: 2019-08-18 | Stop reason: SDUPTHER

## 2019-07-22 NOTE — PROGRESS NOTES
Pt .called back with update. She is still smoking about 20-22 cigarettes/day and has cut down a little. She states she is ready to quit and wanted to know about the Chantix. Explained the pros and cons and explained she will need follow in 2 weeks. She states this will not be a problem. She is not smoking at any more during the day but all through the day. She states she tries to cut back, but feels the patch is not helping as much. Ordered the Chantix starter pack and mad appt. To see patient. The patient will continue individual sessions and medication monitoring by CTTS. Prescribed medication management will be by practitoner.  The patient denies any abnormal behavioral or mental changes at this time.

## 2019-07-23 ENCOUNTER — OFFICE VISIT (OUTPATIENT)
Dept: PULMONOLOGY | Facility: CLINIC | Age: 49
End: 2019-07-23
Payer: MEDICAID

## 2019-07-23 ENCOUNTER — CLINICAL SUPPORT (OUTPATIENT)
Dept: SMOKING CESSATION | Facility: CLINIC | Age: 49
End: 2019-07-23
Payer: COMMERCIAL

## 2019-07-23 ENCOUNTER — CLINICAL SUPPORT (OUTPATIENT)
Dept: PULMONOLOGY | Facility: CLINIC | Age: 49
End: 2019-07-23
Payer: MEDICAID

## 2019-07-23 VITALS
HEIGHT: 67 IN | DIASTOLIC BLOOD PRESSURE: 72 MMHG | BODY MASS INDEX: 35.75 KG/M2 | RESPIRATION RATE: 16 BRPM | OXYGEN SATURATION: 97 % | HEART RATE: 86 BPM | SYSTOLIC BLOOD PRESSURE: 120 MMHG | WEIGHT: 227.75 LBS

## 2019-07-23 DIAGNOSIS — J44.1 COPD EXACERBATION: ICD-10-CM

## 2019-07-23 DIAGNOSIS — F17.210 HEAVY SMOKER (MORE THAN 20 CIGARETTES PER DAY): Primary | ICD-10-CM

## 2019-07-23 DIAGNOSIS — R05.9 COUGH: ICD-10-CM

## 2019-07-23 DIAGNOSIS — J43.1 PANLOBULAR EMPHYSEMA: Primary | ICD-10-CM

## 2019-07-23 DIAGNOSIS — J43.1 PANLOBULAR EMPHYSEMA: ICD-10-CM

## 2019-07-23 LAB
BRPFT: ABNORMAL
FEF 25 75 LLN: 1.61
FEF 25 75 PRE REF: 63.6 %
FEF 25 75 REF: 2.87
FEV1 FVC LLN: 69
FEV1 FVC PRE REF: 93.1 %
FEV1 FVC REF: 80
FEV1 LLN: 2.35
FEV1 PRE REF: 76.6 %
FEV1 REF: 3.02
FVC LLN: 2.97
FVC PRE REF: 81.6 %
FVC REF: 3.8
PEF LLN: 5.32
PEF PRE REF: 67.9 %
PEF REF: 7.2
PRE FEF 25 75: 1.83 L/S (ref 1.61–4.14)
PRE FET 100: 10.97 SEC
PRE FEV1 FVC: 74.54 % (ref 68.93–91.19)
PRE FEV1: 2.31 L (ref 2.35–3.69)
PRE FVC: 3.1 L (ref 2.97–4.63)
PRE PEF: 4.89 L/S (ref 5.32–9.07)

## 2019-07-23 PROCEDURE — 99999 PR PBB SHADOW E&M-EST. PATIENT-LVL I: ICD-10-PCS | Mod: PBBFAC,,,

## 2019-07-23 PROCEDURE — 99215 PR OFFICE/OUTPT VISIT, EST, LEVL V, 40-54 MIN: ICD-10-PCS | Mod: 25,S$PBB,, | Performed by: INTERNAL MEDICINE

## 2019-07-23 PROCEDURE — 94010 BREATHING CAPACITY TEST: CPT | Mod: 26,S$PBB,, | Performed by: INTERNAL MEDICINE

## 2019-07-23 PROCEDURE — 90853 PR GROUP PSYCHOTHERAPY: ICD-10-PCS | Mod: S$GLB,,,

## 2019-07-23 PROCEDURE — 99999 PR PBB SHADOW E&M-EST. PATIENT-LVL IV: CPT | Mod: PBBFAC,,, | Performed by: INTERNAL MEDICINE

## 2019-07-23 PROCEDURE — 90853 GROUP PSYCHOTHERAPY: CPT | Mod: S$GLB,,,

## 2019-07-23 PROCEDURE — 99999 PR PBB SHADOW E&M-EST. PATIENT-LVL I: CPT | Mod: PBBFAC,,,

## 2019-07-23 PROCEDURE — 94010 BREATHING CAPACITY TEST: CPT | Mod: PBBFAC

## 2019-07-23 PROCEDURE — 99214 OFFICE O/P EST MOD 30 MIN: CPT | Mod: PBBFAC | Performed by: INTERNAL MEDICINE

## 2019-07-23 PROCEDURE — 94010 BREATHING CAPACITY TEST: ICD-10-PCS | Mod: 26,S$PBB,, | Performed by: INTERNAL MEDICINE

## 2019-07-23 PROCEDURE — 99999 PR PBB SHADOW E&M-EST. PATIENT-LVL IV: ICD-10-PCS | Mod: PBBFAC,,, | Performed by: INTERNAL MEDICINE

## 2019-07-23 PROCEDURE — 99215 OFFICE O/P EST HI 40 MIN: CPT | Mod: 25,S$PBB,, | Performed by: INTERNAL MEDICINE

## 2019-07-23 RX ORDER — BUDESONIDE 0.5 MG/2ML
0.5 INHALANT ORAL 2 TIMES DAILY
Qty: 120 ML | Refills: 11 | Status: SHIPPED | OUTPATIENT
Start: 2019-07-23 | End: 2021-06-15

## 2019-07-23 RX ORDER — PROMETHAZINE HYDROCHLORIDE AND DEXTROMETHORPHAN HYDROBROMIDE 6.25; 15 MG/5ML; MG/5ML
5 SYRUP ORAL 4 TIMES DAILY PRN
Qty: 240 ML | Refills: 5 | Status: SHIPPED | OUTPATIENT
Start: 2019-07-23 | End: 2019-07-30 | Stop reason: SDUPTHER

## 2019-07-23 RX ORDER — IPRATROPIUM BROMIDE 42 UG/1
2 SPRAY, METERED NASAL 4 TIMES DAILY
Qty: 15 ML | Refills: 11 | Status: SHIPPED | OUTPATIENT
Start: 2019-07-23

## 2019-07-23 RX ORDER — BENZONATATE 200 MG/1
200 CAPSULE ORAL 3 TIMES DAILY PRN
Qty: 90 CAPSULE | Refills: 11 | Status: SHIPPED | OUTPATIENT
Start: 2019-07-23 | End: 2019-07-29 | Stop reason: SDUPTHER

## 2019-07-23 RX ORDER — PREDNISONE 20 MG/1
TABLET ORAL
Qty: 20 TABLET | Refills: 1 | Status: SHIPPED | OUTPATIENT
Start: 2019-07-23 | End: 2019-09-26 | Stop reason: ALTCHOICE

## 2019-07-23 NOTE — PROGRESS NOTES
Site: ON  Date:  7/23/2019  Clinical Status of Patient: Outpatient   Length of Service and Code: 60 minutes - 31205   Number in Attendance: 2  Group Activities/Focus of Group:  orientation, client introductions, completion of TCRS (Tobacco Cessation Rating Scale) learned addiction model, cues/triggers, personal reasons for quitting, medications, goals, quit date    Target symptoms:  withdrawal and medication side effects             The following were rated moderate (3) to severe (4) on TCRS:       Moderate 3: none     Severe 4:   none  Patient's Response to Intervention: The patient is smoking 30-40 cigarettes/day using 21 mg nicotine patch ,but she felt the patch was not doing any good. She has come down from 40 cigarettes/day, but is now stuck. Ordered the Chantix starter pack which she can  today.Discussed behavoir and addiction, avoidance, how to manage cravings and positive thinking. She has taken the Chantix before and knows to eat and drink with each dose.   Progress Toward Goals and Other Mental Status Changes: The patient denies any abnormal behavioral or mental changes at this time.    Interval History:     Diagnosis: Z72.0  Plan: The patient will continue with group therapy sessions and medication regimen prescribed with management by physician or Cessation Clinic Provider. Patient will inform Smoking Clinic Cessation Counselor of symptoms as rated high on TCRS.    Return to Clinic: 2 weeks

## 2019-07-23 NOTE — PATIENT INSTRUCTIONS
Use Budesonide jet nebs twice a day - no more or no less.  Use Albuterol prior to budesonide at least twice a day. May use albuterol every 4 - 6 hours if needed for shortness of breath, cough or chest congestion

## 2019-07-23 NOTE — Clinical Note
The patient is smoking 30-40 cigarettes/day using 21 mg nicotine patch ,but she felt the patch was not doing any good. She has come down from 40 cigarettes/day, but is now stuck. Ordered the Chantix starter pack which she can  today.Discussed behavoir and addiction, avoidance, how to manage cravings and positive thinking. She has taken the Chantix before and knows to eat and drink with each dose.

## 2019-07-23 NOTE — PROGRESS NOTES
Subjective:       Patient ID: Josey Flores is a 48 y.o. female.    Chief Complaint: She       panlobular emphysema     HPI      Sleep with 3 pillows  Cough:  Patient complains of nasal congestion, nonproductive cough and rhinorrhea 4- 6 weeks. Symptoms began 6 weeks ago. Symptoms have been gradually worsening since that time.The cough is dry and is aggravated by dust and reclining position. Associated symptoms include: postnasal drip and shortness of breath. Patient does not have new pets. Patient does not have a history of asthma. Patient does not have a history of environmental allergens. Patient has not traveled recently. Patient does have a history of smoking. Patient has had a previous chest x-ray. Patient has not had a PPD done.  The patient continues to smoke cigarettes despite admonitions to the contrary.  She reduced the amount of cigarettes she is smoking on a daily basis.  She has persistent cough at feels like she has postnasal drip.  When she lies down at night the cough is worse.  During her last visit a short course of Medrol Dosepak was given which gave some relief but did not completely resolve her symptoms.    The patient was placed on Norco for pain.  At this time will need to stop the prescription for hydrocodone that was given to her for coughing.  Alternative prescription cough medication of a non narcotic was prescribed.    Past Medical History:   Diagnosis Date    Anxiety     Asthma     Cancer     right breast, metastatic-lymph nodes, bone, and thyroid     COPD (chronic obstructive pulmonary disease)     Depression     History of psychiatric hospitalization     2000; suicidal ideation    Hx of psychiatric care     Hypothyroidism     Miscarriage     five miscarriages    Obesity     Psychiatric problem     Thyroid cancer 2017     Past Surgical History:   Procedure Laterality Date    ABSCESS DRAINAGE      bilateral axilla    ADENOIDECTOMY      APPENDECTOMY       CHOLECYSTECTOMY      EXCHANGE-WOUND VAC Bilateral 2017    [facility]    EXCISION-HIDRADENITIS Bilateral 2017    [facility] [place]    EXCISION-MASS-ARM Right 3/14/2018    [facility] [place]    [id number] N/A 2016    [facility] at [facility] [place]    INCISION AND DRAINAGE (I & D)-GROIN Left 2017    [facility] [place]    INCISION AND DRAINAGE-THIGH Right 2017    [facility] [place]    MASS EXCISION      PLACEMENT-WOUND VAC Bilateral 2017    [facility] [place]    SHAVING-ENDOMETRIAL  2016    [facility] [place]    SKIN GRAFT  2017    SKIN GRAFT-SPLIT THICKNESS Left 2017    [facility] [place]    THYROIDECTOMY Bilateral     THYROIDECTOMY Bilateral 2017    [facility]augh [facility] [place]    TONSILLECTOMY       Social History     Socioeconomic History    Marital status:      Spouse name: Not on file    Number of children: 2    Years of education: Not on file    Highest education level: Not on file   Occupational History    Not on file   Social Needs    Financial resource strain: Not on file    Food insecurity:     Worry: Not on file     Inability: Not on file    Transportation needs:     Medical: Not on file     Non-medical: Not on file   Tobacco Use    Smoking status: Current Every Day Smoker     Packs/day: 1.00     Years: 32.00     Pack years: 32.00     Types: Cigarettes     Start date: 1985     Last attempt to quit: 2017     Years since quittin.1    Smokeless tobacco: Never Used    Tobacco comment: 45 pack year history   Substance and Sexual Activity    Alcohol use: No    Drug use: No    Sexual activity: Yes     Partners: Male   Lifestyle    Physical activity:     Days per  "week: Not on file     Minutes per session: Not on file    Stress: Not on file   Relationships    Social connections:     Talks on phone: Not on file     Gets together: Not on file     Attends Holiness service: Not on file     Active member of club or organization: Not on file     Attends meetings of clubs or organizations: Not on file     Relationship status: Not on file   Other Topics Concern    Patient feels they ought to cut down on drinking/drug use Not Asked    Patient annoyed by others criticizing their drinking/drug use Not Asked    Patient has felt bad or guilty about drinking/drug use Not Asked    Patient has had a drink/used drugs as an eye opener in the AM Not Asked   Social History Narrative     with two adult children; common law marriage (10+years); raised Religious, no current Holiness practice     Review of Systems   Constitutional: Positive for fatigue. Negative for fever.   HENT: Positive for postnasal drip, rhinorrhea and congestion.    Eyes: Negative for redness and itching.   Respiratory: Positive for cough, sputum production, shortness of breath, dyspnea on extertion, use of rescue inhaler and Paroxysmal Nocturnal Dyspnea.    Cardiovascular: Negative for chest pain, palpitations and leg swelling.   Genitourinary: Negative for difficulty urinating and hematuria.   Endocrine: Negative for cold intolerance and heat intolerance.    Skin: Negative for rash.   Gastrointestinal: Negative for nausea and abdominal pain.   Neurological: Negative for dizziness, syncope, weakness and light-headedness.   Hematological: Negative for adenopathy. Does not bruise/bleed easily.   Psychiatric/Behavioral: Negative for sleep disturbance. The patient is not nervous/anxious.        Objective:      /72   Pulse 86   Resp 16   Ht 5' 6.93" (1.7 m)   Wt 103.3 kg (227 lb 11.8 oz)   LMP  (LMP Unknown) Comment: no cycle x3 years  SpO2 97%   BMI 35.74 kg/m²   Physical Exam   Constitutional: She is " oriented to person, place, and time. She appears well-developed and well-nourished.   HENT:   Head: Normocephalic and atraumatic.   Mouth/Throat: Oropharyngeal exudate present.   Eyes: Pupils are equal, round, and reactive to light. Conjunctivae are normal.   Neck: Neck supple. No JVD present. No tracheal deviation present. No thyromegaly present.   Cardiovascular: Normal rate, regular rhythm and normal heart sounds.   Pulmonary/Chest: Effort normal. No respiratory distress. She has decreased breath sounds. She has wheezes in the right lower field and the left lower field. She has no rhonchi. She has no rales. She exhibits no tenderness.   Abdominal: Soft. Bowel sounds are normal.   Musculoskeletal: Normal range of motion. She exhibits no edema.   Lymphadenopathy:     She has no cervical adenopathy.   Neurological: She is alert and oriented to person, place, and time.   Skin: Skin is warm and dry.   Nursing note and vitals reviewed.    Personal Diagnostic Review  Chest x-ray: Stable  US Soft Tissue Head Neck Thyroid  Narrative: EXAMINATION:  US SOFT TISSUE HEAD NECK THYROID    CLINICAL HISTORY:  Status post total thyroidectomy due to thyroid cancer, needs evaluation of neck area to rule pathologic lesions; Personal history of malignant neoplasm of thyroid    TECHNIQUE:  Ultrasound of the thyroid and cervical lymph nodes was performed.    COMPARISON:  None    FINDINGS:  The thyroid gland is surgically absent.  No residual thyroid tissue identified.  Along the very superior aspect of the level 5 location on the left is a lymph node that is not considered enlarged by size criteria that measures 0.9 x 0.4 x 0.7 cm.  No other lymph nodes are identified.  Impression: 1.  As above    Electronically signed by: Bello Agee DO  Date:    06/13/2019  Time:    11:13      Office Spirometry Results:     No flowsheet data found.  Pulmonary Studies Review 7/23/2019   SpO2 97   Peak Flow -   Height 66.929   Weight 3643.76   BMI  (Calculated) 35.8   Predicted Distance 363.11   Predicted Distance Meters (Calculated) 500.14         Assessment:       Panlobular emphysema  -     ipratropium (ATROVENT) 42 mcg (0.06 %) nasal spray; 2 sprays by Nasal route 4 (four) times daily. As needed for rhinitis. Take in evening before bed and in the morning  Dispense: 15 mL; Refill: 11  -     budesonide (PULMICORT) 0.5 mg/2 mL nebulizer solution; Take 2 mLs (0.5 mg total) by nebulization 2 (two) times daily. Wash out mouth after using.  Dispense: 120 mL; Refill: 11  -     Stress test, pulmonary; Future; Expected date: 07/31/2019    Cough  -     benzonatate (TESSALON) 200 MG capsule; Take 1 capsule (200 mg total) by mouth 3 (three) times daily as needed for Cough.  Dispense: 90 capsule; Refill: 11  -     promethazine-dextromethorphan (PROMETHAZINE-DM) 6.25-15 mg/5 mL Syrp; Take 5 mLs by mouth 4 (four) times daily as needed (cough).  Dispense: 240 mL; Refill: 5    COPD exacerbation  -     predniSONE (DELTASONE) 20 MG tablet; Prednisone 60 mg/ day for 3 days, 40 mg/day for 3 days,20 mg/ day for 3 days, (1/2 tablet )10 mg a day for 3 days.  Dispense: 20 tablet; Refill: 1          Outpatient Encounter Medications as of 7/23/2019   Medication Sig Dispense Refill    albuterol (PROVENTIL) 2.5 mg /3 mL (0.083 %) nebulizer solution Take 3 mLs (2.5 mg total) by nebulization every 6 (six) hours while awake. 270 mL 11    ALPRAZolam (XANAX) 0.5 MG tablet Take 1 tablet (0.5 mg total) by mouth 2 (two) times daily as needed for Insomnia or Anxiety. 60 tablet 0    azithromycin (ZITHROMAX Z-СЕРГЕЙ) 250 MG tablet 500 mg on day 1 (two tablets) followed by 250 mg once daily on days 2-5 6 tablet 0    benzonatate (TESSALON) 200 MG capsule Take 1 capsule (200 mg total) by mouth 3 (three) times daily as needed for Cough. 90 capsule 11    calcium carbonate 400 mg calcium (1,000 mg) Chew Take 2,000 mg by mouth once daily.      citalopram (CELEXA) 20 MG tablet Take 1 tablet (20 mg  total) by mouth every evening. 90 tablet 1    cyanocobalamin (VITAMIN B-12) 1000 MCG tablet Take 1 tablet (1,000 mcg total) by mouth once daily. 90 tablet 3    ergocalciferol (VITAMIN D2) 50,000 unit Cap Take 5,000 Units by mouth once daily at 6am.       fluticasone propionate (FLONASE) 50 mcg/actuation nasal spray 2 sprays (100 mcg total) by Each Nare route once daily. 1 Bottle 11    folic acid (FOLVITE) 1 MG tablet Take 1 tablet (1 mg total) by mouth once daily. 90 tablet 1    HYDROcodone-acetaminophen (NORCO) 7.5-325 mg per tablet Take 1 tablet by mouth every 6 (six) hours as needed for Pain. 120 tablet 0    letrozole (FEMARA) 2.5 mg Tab TAKE 1 TABLET BY MOUTH ONCE DAILY 90 tablet 1    levothyroxine (SYNTHROID) 150 MCG tablet Take 1 tablet (150 mcg total) by mouth once daily. 30 tablet 5    multivitamin (THERAGRAN) per tablet Take 1 tablet by mouth once daily.      nicotine (NICODERM CQ) 21 mg/24 hr Place 1 patch onto the skin once daily. 28 patch 0    palbociclib (IBRANCE) 125 mg Cap Take 125 mg by mouth once daily For 21 days followed by 7 days off. 21 capsule 5    varenicline (CHANTIX STARTING MONTH BOX) 0.5 mg (11)- 1 mg (42) tablet Take one 0.5mg tab by mouth once daily X3 days,then increase to one 0.5mg tab twice daily X4 days,then increase to one 1mg tab twice daily 53 tablet 0    [DISCONTINUED] benzonatate (TESSALON PERLES) 100 MG capsule Take 1 capsule (100 mg total) by mouth every 6 (six) hours as needed for Cough. 30 capsule 1    [DISCONTINUED] methylPREDNISolone (MEDROL DOSEPACK) 4 mg tablet use as directed 1 Package 1    budesonide (PULMICORT) 0.5 mg/2 mL nebulizer solution Take 2 mLs (0.5 mg total) by nebulization 2 (two) times daily. Wash out mouth after using. 120 mL 11    ipratropium (ATROVENT) 42 mcg (0.06 %) nasal spray 2 sprays by Nasal route 4 (four) times daily. As needed for rhinitis. Take in evening before bed and in the morning 15 mL 11    predniSONE (DELTASONE) 20 MG  tablet Prednisone 60 mg/ day for 3 days, 40 mg/day for 3 days,20 mg/ day for 3 days, (1/2 tablet )10 mg a day for 3 days. 20 tablet 1    promethazine-dextromethorphan (PROMETHAZINE-DM) 6.25-15 mg/5 mL Syrp Take 5 mLs by mouth 4 (four) times daily as needed (cough). 240 mL 5     No facility-administered encounter medications on file as of 2019.      Plan:       Requested Prescriptions     Signed Prescriptions Disp Refills    benzonatate (TESSALON) 200 MG capsule 90 capsule 11     Sig: Take 1 capsule (200 mg total) by mouth 3 (three) times daily as needed for Cough.    promethazine-dextromethorphan (PROMETHAZINE-DM) 6.25-15 mg/5 mL Syrp 240 mL 5     Sig: Take 5 mLs by mouth 4 (four) times daily as needed (cough).    ipratropium (ATROVENT) 42 mcg (0.06 %) nasal spray 15 mL 11     Si sprays by Nasal route 4 (four) times daily. As needed for rhinitis. Take in evening before bed and in the morning    budesonide (PULMICORT) 0.5 mg/2 mL nebulizer solution 120 mL 11     Sig: Take 2 mLs (0.5 mg total) by nebulization 2 (two) times daily. Wash out mouth after using.    predniSONE (DELTASONE) 20 MG tablet 20 tablet 1     Sig: Prednisone 60 mg/ day for 3 days, 40 mg/day for 3 days,20 mg/ day for 3 days, (1/2 tablet )10 mg a day for 3 days.     Problem List Items Addressed This Visit     Chronic obstructive pulmonary disease - Primary    Relevant Medications    ipratropium (ATROVENT) 42 mcg (0.06 %) nasal spray    budesonide (PULMICORT) 0.5 mg/2 mL nebulizer solution    Other Relevant Orders    Stress test, pulmonary      Other Visit Diagnoses     Cough        Relevant Medications    benzonatate (TESSALON) 200 MG capsule    promethazine-dextromethorphan (PROMETHAZINE-DM) 6.25-15 mg/5 mL Syrp    COPD exacerbation        Relevant Medications    predniSONE (DELTASONE) 20 MG tablet      Patient was given a jet nebulization treatment with albuterol. The patient was instructed on the proper use of nebulizer machine and  given nebulizer set up device. Side effects of medication discussed.  Patient voiced understanding.   CPT code 61523         Follow up in about 6 months (around 1/23/2020) for 6 min walk.    MEDICAL DECISION MAKING: Moderate to high complexity.  Overall, the multiple problems listed are of moderate to high severity that may impact quality of life and activities of daily living. Side effects of medications, treatment plan as well as options and alternatives reviewed and discussed with patient. There was counseling of patient concerning these issues.    Total time spent in face to face counseling and coordination of care - 45  minutes over 50% of time was used in discussion of prognosis, risks, benefits of treatment, instructions and compliance with regimen . Discussion with other physicians or health care providers (DME, NP, pharmacy, respiratory therapy) occurred.

## 2019-07-29 ENCOUNTER — INFUSION (OUTPATIENT)
Dept: INFUSION THERAPY | Facility: HOSPITAL | Age: 49
End: 2019-07-29
Attending: INTERNAL MEDICINE
Payer: MEDICAID

## 2019-07-29 ENCOUNTER — OFFICE VISIT (OUTPATIENT)
Dept: HEMATOLOGY/ONCOLOGY | Facility: CLINIC | Age: 49
End: 2019-07-29
Payer: MEDICAID

## 2019-07-29 VITALS
HEART RATE: 85 BPM | SYSTOLIC BLOOD PRESSURE: 115 MMHG | RESPIRATION RATE: 18 BRPM | BODY MASS INDEX: 35.36 KG/M2 | DIASTOLIC BLOOD PRESSURE: 67 MMHG | OXYGEN SATURATION: 97 % | WEIGHT: 225.31 LBS | TEMPERATURE: 98 F | HEIGHT: 67 IN

## 2019-07-29 DIAGNOSIS — Z17.0 MALIGNANT NEOPLASM OF OVERLAPPING SITES OF RIGHT BREAST IN FEMALE, ESTROGEN RECEPTOR POSITIVE: ICD-10-CM

## 2019-07-29 DIAGNOSIS — C79.51 SECONDARY CANCER OF BONE: Primary | ICD-10-CM

## 2019-07-29 DIAGNOSIS — C50.811 MALIGNANT NEOPLASM OF OVERLAPPING SITES OF RIGHT BREAST IN FEMALE, ESTROGEN RECEPTOR POSITIVE: ICD-10-CM

## 2019-07-29 DIAGNOSIS — R05.9 COUGH: ICD-10-CM

## 2019-07-29 PROCEDURE — 99215 OFFICE O/P EST HI 40 MIN: CPT | Mod: 25,S$PBB,, | Performed by: INTERNAL MEDICINE

## 2019-07-29 PROCEDURE — 99215 PR OFFICE/OUTPT VISIT, EST, LEVL V, 40-54 MIN: ICD-10-PCS | Mod: 25,S$PBB,, | Performed by: INTERNAL MEDICINE

## 2019-07-29 PROCEDURE — 99215 OFFICE O/P EST HI 40 MIN: CPT | Mod: PBBFAC,25 | Performed by: INTERNAL MEDICINE

## 2019-07-29 PROCEDURE — 99999 PR PBB SHADOW E&M-EST. PATIENT-LVL V: CPT | Mod: PBBFAC,,, | Performed by: INTERNAL MEDICINE

## 2019-07-29 PROCEDURE — 63600175 PHARM REV CODE 636 W HCPCS: Mod: JG | Performed by: INTERNAL MEDICINE

## 2019-07-29 PROCEDURE — 96372 THER/PROPH/DIAG INJ SC/IM: CPT

## 2019-07-29 PROCEDURE — 99999 PR PBB SHADOW E&M-EST. PATIENT-LVL V: ICD-10-PCS | Mod: PBBFAC,,, | Performed by: INTERNAL MEDICINE

## 2019-07-29 RX ORDER — BENZONATATE 200 MG/1
200 CAPSULE ORAL 3 TIMES DAILY PRN
Qty: 90 CAPSULE | Refills: 11 | Status: SHIPPED | OUTPATIENT
Start: 2019-07-29 | End: 2020-05-28 | Stop reason: SDUPTHER

## 2019-07-29 RX ADMIN — DENOSUMAB 120 MG: 120 INJECTION SUBCUTANEOUS at 03:07

## 2019-07-29 NOTE — PROGRESS NOTES
Subjective:       Patient ID: Josey Flores is a 48 y.o. female.    Chief Complaint: Follow-up    HPI The patient is a 48-year-old  female who presents to the hematology oncology clinic today for follow up for metastatic right breast carcinoma.    She is a patient of dr Delong whom I am   seeing today for the first time.  She has been on Ibrance/letrozole since 2/2017   She is on Xgeva as a bone protector. She is currently receiving it every 3 months after being  on it monthly for many months  ST MEDICAL HISTORY:   1. HSIL on pap smear s/p LEEP  2. Retinal hemorrhage in left eye     SURGICAL HISTORY:   1.  Tonsillectomy and adenoidectomy  2.  Appendectomy  3.  Cholecystectomy  4.  D&C  5.  Thyroidectomy on August 31, 2017  6.  Right axillary hydradenitis/cellulitis and left inguinal hydradenitis/cellulitis s/p debridement and skin grafting     FAMILY HISTORY: She reports that her maternal aunt had lung cancer in her 70s.  She used to smoke cigarettes.  Her maternal cousin had lung cancer in her 60s.  She used to smoke cigarettes.  She denies any other immediate family members with cancer or bleeding/clotting disorders.     SOCIAL HISTORY: She reports a 46+ pack year smoking history. She drinks wine occasionally.  She denies any history of recreational drug use.  She is engaged and has 2 sons.  She used to work for the [employer].  She lives in [place], [place].      ALLERGIES: [NKDA]     MEDICATIONS: [Medcard has been reviewed and/or reconciled  Review of Systems   Constitutional: Negative.    HENT: Negative.    Eyes: Negative.    Respiratory: Positive for cough. Negative for wheezing.    Cardiovascular: Negative.  Negative for chest pain.   Gastrointestinal: Negative.    Genitourinary: Negative.    Musculoskeletal:        Pain right rib cage underneath right breast   Neurological: Negative.    Psychiatric/Behavioral: Negative.        Objective:      Physical Exam   Constitutional: She is  oriented to person, place, and time. She appears well-developed. No distress.   HENT:   Head: Normocephalic.   Right Ear: Tympanic membrane, external ear and ear canal normal.   Left Ear: Tympanic membrane, external ear and ear canal normal.   Nose: Nose normal. Right sinus exhibits no maxillary sinus tenderness and no frontal sinus tenderness. Left sinus exhibits no maxillary sinus tenderness and no frontal sinus tenderness.   Mouth/Throat: Oropharynx is clear and moist and mucous membranes are normal.   Teeth normal.  Gums normal.   Eyes: Pupils are equal, round, and reactive to light. Conjunctivae and lids are normal.   Neck: Normal carotid pulses, no hepatojugular reflux and no JVD present. Carotid bruit is not present. No tracheal deviation present. No thyroid mass and no thyromegaly present.   Cardiovascular: Normal rate, regular rhythm, S1 normal, S2 normal, normal heart sounds and intact distal pulses. Exam reveals no gallop and no friction rub.   No murmur heard.  Carotid exam normal   Pulmonary/Chest: Effort normal and breath sounds normal. No accessory muscle usage. No respiratory distress. She has no wheezes. She has no rales. She exhibits no tenderness.   Abdominal: Soft. Normal appearance. She exhibits no distension and no mass. There is no splenomegaly or hepatomegaly. There is no tenderness. There is no rebound and no guarding.   Musculoskeletal: Normal range of motion. She exhibits no edema or tenderness.        Right hand: Normal.        Left hand: Normal.       Lymphadenopathy:     She has no cervical adenopathy.     She has no axillary adenopathy.        Right: No inguinal and no supraclavicular adenopathy present.        Left: No inguinal and no supraclavicular adenopathy present.   Neurological: She is alert and oriented to person, place, and time. She has normal strength. No cranial nerve deficit. Coordination normal.   Skin: Skin is warm and dry. No rash noted. She is not diaphoretic. No  cyanosis or erythema. No pallor. Nails show no clubbing.   Psychiatric: She has a normal mood and affect. Her behavior is normal. Judgment and thought content normal.       Wt Readings from Last 3 Encounters:   07/29/19 102.2 kg (225 lb 5 oz)   07/23/19 103.3 kg (227 lb 11.8 oz)   05/27/19 102.3 kg (225 lb 10.3 oz)     Temp Readings from Last 3 Encounters:   07/29/19 98 °F (36.7 °C) (Oral)   05/24/19 97.8 °F (36.6 °C) (Oral)   04/26/19 98 °F (36.7 °C) (Oral)     BP Readings from Last 3 Encounters:   07/29/19 115/67   07/23/19 120/72   05/27/19 124/70     Pulse Readings from Last 3 Encounters:   07/29/19 85   07/23/19 86   05/27/19 96       Assessment:       1. Secondary cancer of bone    2. Cough    3. Malignant neoplasm of overlapping sites of right breast in female, estrogen receptor positive        Plan:       Lab Results   Component Value Date    WBC 5.07 07/29/2019    HGB 12.1 07/29/2019    HCT 34.1 (L) 07/29/2019     (H) 07/29/2019     07/29/2019     Lab Results   Component Value Date    CREATININE 1.3 07/29/2019     Lab Results   Component Value Date    CALCIUM 9.5 07/29/2019    PHOS 3.2 11/29/2017       She will receive Xgeva today.  She will be asked to continue calcium and Vit D.  She will see dr Delong in 6 weeks with a cbc/cmp, Ct scans of c/a/p and x rays of the right rib cage.  Refill of tessalon pearls   for cough

## 2019-07-30 DIAGNOSIS — R05.9 COUGH: ICD-10-CM

## 2019-07-30 RX ORDER — PROMETHAZINE HYDROCHLORIDE AND DEXTROMETHORPHAN HYDROBROMIDE 6.25; 15 MG/5ML; MG/5ML
5 SYRUP ORAL 4 TIMES DAILY PRN
Qty: 240 ML | Refills: 5 | Status: SHIPPED | OUTPATIENT
Start: 2019-07-30 | End: 2019-08-09

## 2019-08-01 DIAGNOSIS — Z17.0 MALIGNANT NEOPLASM OF OVERLAPPING SITES OF RIGHT BREAST IN FEMALE, ESTROGEN RECEPTOR POSITIVE: ICD-10-CM

## 2019-08-01 DIAGNOSIS — C77.3 MALIGNANT NEOPLASM METASTATIC TO LYMPH NODE OF AXILLA: ICD-10-CM

## 2019-08-01 DIAGNOSIS — G89.3 NEOPLASM RELATED PAIN: ICD-10-CM

## 2019-08-01 DIAGNOSIS — C79.51 SECONDARY CANCER OF BONE: ICD-10-CM

## 2019-08-01 DIAGNOSIS — C50.811 MALIGNANT NEOPLASM OF OVERLAPPING SITES OF RIGHT BREAST IN FEMALE, ESTROGEN RECEPTOR POSITIVE: ICD-10-CM

## 2019-08-01 RX ORDER — PALBOCICLIB 125 MG/1
CAPSULE ORAL
Qty: 21 CAPSULE | Refills: 5 | Status: SHIPPED | OUTPATIENT
Start: 2019-08-01 | End: 2020-01-29 | Stop reason: SDUPTHER

## 2019-08-05 ENCOUNTER — TELEPHONE (OUTPATIENT)
Dept: SMOKING CESSATION | Facility: CLINIC | Age: 49
End: 2019-08-05

## 2019-08-05 DIAGNOSIS — F41.9 ANXIETY: ICD-10-CM

## 2019-08-05 RX ORDER — ALPRAZOLAM 0.5 MG/1
0.5 TABLET ORAL 2 TIMES DAILY PRN
Qty: 60 TABLET | Refills: 0 | Status: SHIPPED | OUTPATIENT
Start: 2019-08-05 | End: 2019-09-04 | Stop reason: SDUPTHER

## 2019-08-09 DIAGNOSIS — C77.3 MALIGNANT NEOPLASM METASTATIC TO LYMPH NODE OF AXILLA: ICD-10-CM

## 2019-08-09 DIAGNOSIS — Z17.0 MALIGNANT NEOPLASM OF OVERLAPPING SITES OF RIGHT BREAST IN FEMALE, ESTROGEN RECEPTOR POSITIVE: ICD-10-CM

## 2019-08-09 DIAGNOSIS — C79.51 SECONDARY CANCER OF BONE: ICD-10-CM

## 2019-08-09 DIAGNOSIS — G89.3 NEOPLASM RELATED PAIN: ICD-10-CM

## 2019-08-09 DIAGNOSIS — C50.811 MALIGNANT NEOPLASM OF OVERLAPPING SITES OF RIGHT BREAST IN FEMALE, ESTROGEN RECEPTOR POSITIVE: ICD-10-CM

## 2019-08-09 RX ORDER — HYDROCODONE BITARTRATE AND ACETAMINOPHEN 7.5; 325 MG/1; MG/1
1 TABLET ORAL EVERY 6 HOURS PRN
Qty: 120 TABLET | Refills: 0 | Status: SHIPPED | OUTPATIENT
Start: 2019-08-09 | End: 2019-09-09 | Stop reason: SDUPTHER

## 2019-08-18 DIAGNOSIS — F17.210 HEAVY SMOKER (MORE THAN 20 CIGARETTES PER DAY): ICD-10-CM

## 2019-08-19 ENCOUNTER — TELEPHONE (OUTPATIENT)
Dept: SMOKING CESSATION | Facility: CLINIC | Age: 49
End: 2019-08-19

## 2019-08-19 RX ORDER — VARENICLINE TARTRATE 1 MG/1
TABLET, FILM COATED ORAL
Qty: 53 TABLET | Refills: 0 | Status: SHIPPED | OUTPATIENT
Start: 2019-08-19 | End: 2020-04-22

## 2019-08-19 NOTE — TELEPHONE ENCOUNTER
Spoke with Alessio and asked to have patient call in regards to her medication - Chantix.the medication was reordered and the pharmacy sent a text saying it was ready. Asked to have call Amy Maya RRT,at [phone] for and update and to reschedule.

## 2019-08-23 ENCOUNTER — HOSPITAL ENCOUNTER (EMERGENCY)
Facility: HOSPITAL | Age: 49
Discharge: HOME | End: 2019-08-24
Attending: FAMILY MEDICINE
Payer: MEDICAID

## 2019-08-23 DIAGNOSIS — R10.9 ABDOMINAL PAIN: Primary | ICD-10-CM

## 2019-08-23 PROCEDURE — 93010 ELECTROCARDIOGRAM REPORT: CPT | Mod: ,,, | Performed by: INTERNAL MEDICINE

## 2019-08-23 PROCEDURE — 93010 EKG 12-LEAD: ICD-10-PCS | Mod: ,,, | Performed by: INTERNAL MEDICINE

## 2019-08-23 PROCEDURE — 80053 COMPREHEN METABOLIC PANEL: CPT

## 2019-08-23 PROCEDURE — 93005 ELECTROCARDIOGRAM TRACING: CPT

## 2019-08-23 PROCEDURE — 83690 ASSAY OF LIPASE: CPT

## 2019-08-23 PROCEDURE — 85025 COMPLETE CBC W/AUTO DIFF WBC: CPT

## 2019-08-23 PROCEDURE — 82150 ASSAY OF AMYLASE: CPT

## 2019-08-23 PROCEDURE — 99285 EMERGENCY DEPT VISIT HI MDM: CPT | Mod: 25

## 2019-08-23 RX ORDER — DICYCLOMINE HYDROCHLORIDE 10 MG/1
10 CAPSULE ORAL
Status: COMPLETED | OUTPATIENT
Start: 2019-08-23 | End: 2019-08-24

## 2019-08-24 VITALS
OXYGEN SATURATION: 98 % | HEIGHT: 67 IN | DIASTOLIC BLOOD PRESSURE: 72 MMHG | WEIGHT: 227.63 LBS | BODY MASS INDEX: 35.73 KG/M2 | TEMPERATURE: 98 F | RESPIRATION RATE: 15 BRPM | HEART RATE: 77 BPM | SYSTOLIC BLOOD PRESSURE: 127 MMHG

## 2019-08-24 LAB
ALBUMIN SERPL BCP-MCNC: 3.9 G/DL (ref 3.5–5.2)
ALP SERPL-CCNC: 49 U/L (ref 55–135)
ALT SERPL W/O P-5'-P-CCNC: 25 U/L (ref 10–44)
AMYLASE SERPL-CCNC: 69 U/L (ref 20–110)
ANION GAP SERPL CALC-SCNC: 10 MMOL/L (ref 8–16)
AST SERPL-CCNC: 23 U/L (ref 10–40)
BACTERIA #/AREA URNS HPF: ABNORMAL /HPF
BASOPHILS # BLD AUTO: 0.03 K/UL (ref 0–0.2)
BASOPHILS NFR BLD: 0.7 % (ref 0–1.9)
BILIRUB SERPL-MCNC: 0.4 MG/DL (ref 0.1–1)
BILIRUB UR QL STRIP: NEGATIVE
BUN SERPL-MCNC: 17 MG/DL (ref 6–20)
CALCIUM SERPL-MCNC: 9.4 MG/DL (ref 8.7–10.5)
CHLORIDE SERPL-SCNC: 104 MMOL/L (ref 95–110)
CLARITY UR: CLEAR
CO2 SERPL-SCNC: 22 MMOL/L (ref 23–29)
COLOR UR: YELLOW
CREAT SERPL-MCNC: 1.4 MG/DL (ref 0.5–1.4)
DIFFERENTIAL METHOD: ABNORMAL
EOSINOPHIL # BLD AUTO: 0.1 K/UL (ref 0–0.5)
EOSINOPHIL NFR BLD: 2.3 % (ref 0–8)
ERYTHROCYTE [DISTWIDTH] IN BLOOD BY AUTOMATED COUNT: 13.8 % (ref 11.5–14.5)
EST. GFR  (AFRICAN AMERICAN): 51 ML/MIN/1.73 M^2
EST. GFR  (NON AFRICAN AMERICAN): 44 ML/MIN/1.73 M^2
GLUCOSE SERPL-MCNC: 98 MG/DL (ref 70–110)
GLUCOSE UR QL STRIP: NEGATIVE
HCT VFR BLD AUTO: 37.8 % (ref 37–48.5)
HGB BLD-MCNC: 13.1 G/DL (ref 12–16)
HGB UR QL STRIP: ABNORMAL
KETONES UR QL STRIP: NEGATIVE
LEUKOCYTE ESTERASE UR QL STRIP: ABNORMAL
LIPASE SERPL-CCNC: 41 U/L (ref 4–60)
LYMPHOCYTES # BLD AUTO: 1.7 K/UL (ref 1–4.8)
LYMPHOCYTES NFR BLD: 40.1 % (ref 18–48)
MCH RBC QN AUTO: 37.2 PG (ref 27–31)
MCHC RBC AUTO-ENTMCNC: 34.7 G/DL (ref 32–36)
MCV RBC AUTO: 107 FL (ref 82–98)
MICROSCOPIC COMMENT: ABNORMAL
MONOCYTES # BLD AUTO: 0.3 K/UL (ref 0.3–1)
MONOCYTES NFR BLD: 6.5 % (ref 4–15)
NEUTROPHILS # BLD AUTO: 2.2 K/UL (ref 1.8–7.7)
NEUTROPHILS NFR BLD: 50.6 % (ref 38–73)
NITRITE UR QL STRIP: NEGATIVE
PH UR STRIP: 6 [PH] (ref 5–8)
PLATELET # BLD AUTO: 286 K/UL (ref 150–350)
PMV BLD AUTO: 10.5 FL (ref 9.2–12.9)
POTASSIUM SERPL-SCNC: 3.8 MMOL/L (ref 3.5–5.1)
PROT SERPL-MCNC: 7.4 G/DL (ref 6–8.4)
PROT UR QL STRIP: NEGATIVE
RBC # BLD AUTO: 3.52 M/UL (ref 4–5.4)
RBC #/AREA URNS HPF: 10 /HPF (ref 0–4)
SODIUM SERPL-SCNC: 136 MMOL/L (ref 136–145)
SP GR UR STRIP: >=1.03 (ref 1–1.03)
SQUAMOUS #/AREA URNS HPF: 2 /HPF
URN SPEC COLLECT METH UR: ABNORMAL
UROBILINOGEN UR STRIP-ACNC: NEGATIVE EU/DL
WBC # BLD AUTO: 4.31 K/UL (ref 3.9–12.7)
WBC #/AREA URNS HPF: 30 /HPF (ref 0–5)

## 2019-08-24 PROCEDURE — 81000 URINALYSIS NONAUTO W/SCOPE: CPT

## 2019-08-24 PROCEDURE — 25000003 PHARM REV CODE 250: Performed by: FAMILY MEDICINE

## 2019-08-24 PROCEDURE — 36415 COLL VENOUS BLD VENIPUNCTURE: CPT

## 2019-08-24 RX ADMIN — DICYCLOMINE HYDROCHLORIDE 10 MG: 10 CAPSULE ORAL at 12:08

## 2019-08-24 NOTE — ED PROVIDER NOTES
"SCRIBE #1 NOTE: I, Kerri Fuller, am scribing for, and in the presence of, Maranda Morgan MD. I have scribed the entire note.       History     Chief Complaint   Patient presents with    Abdominal Pain     pt reports R upper abd pain with nausea/vomiting, decreased appetite     Review of patient's allergies indicates:   Allergen Reactions    Nsaids (non-steroidal anti-inflammatory drug) Other (See Comments)     Instructed not to take NSAID drugs with chemo pill.    Vancomycin analogues Itching         History of Present Illness     HPI    8/23/2019, 11:36 PM  History obtained from the patient      History of Present Illness: Josey Flores is a 48 y.o. female patient with a PMHx of COPD, asthma, right breast cancer (stage 4), obesity, anxiety, and s/p FRANCA who presents to the Emergency Department for evaluation of R upper and diffuse lower abdominal pain which onset gradually over the past few days. Pain is described as "someone punched me in the ribs". Pt states that she has intermittent chronic abdominal pain, but today was worse than prior. Additionally, pt reports generalized weakness over the last 24 hours with accompanying n/v/d. Pt states that she has been unable to keep medication down. Symptoms are constant and moderate in severity. No mitigating or exacerbating factors reported. Associated sxs include n/v/d, feeling "hot and cold", and generalized weakness. Patient denies any constipation, hematemesis, hematochezia, dysuria, hematuria, urinary frequency/ urgency, HA, dizziness, CP, SOB, cough, congestion, and all other sxs at this time. No further complaints or concerns at this time.     Arrival mode: Personal vehicle      PCP: Aileen Sadler MD        Past Medical History:  Past Medical History:   Diagnosis Date    Anxiety     Asthma     Cancer     right breast, metastatic-lymph nodes, bone, and thyroid     COPD (chronic obstructive pulmonary disease)     Depression     History of " psychiatric hospitalization     2000; suicidal ideation    Hx of psychiatric care     Hypothyroidism     Miscarriage     five miscarriages    Obesity     Psychiatric problem     Thyroid cancer 2017       Past Surgical History:  Past Surgical History:   Procedure Laterality Date    ABSCESS DRAINAGE      bilateral axilla    ADENOIDECTOMY      APPENDECTOMY      CHOLECYSTECTOMY      EXCHANGE-WOUND VAC Bilateral 5/19/2017    [facility]    EXCISION-HIDRADENITIS Bilateral 5/17/2017    [facility]    EXCISION-MASS-ARM Right 3/14/2018    [facility]    [id number] N/A 12/14/2016    [facility] E[facility]    INCISION AND DRAINAGE (I & D)-GROIN Left 6/16/2017    [facility]    INCISION AND DRAINAGE-THIGH Right 6/16/2017    [facility]    MASS EXCISION      PLACEMENT-WOUND VAC Bilateral 5/17/2017    [facility]    SHAVING-ENDOMETRIAL  12/14/2016    [facility]    SKIN GRAFT  06/16/2017    SKIN GRAFT-SPLIT THICKNESS Left 6/16/2017    [facility]    THYROIDECTOMY Bilateral     THYROIDECTOMY Bilateral 8/31/2017    [facility]    TONSILLECTOMY           Family History:  Family History   Problem Relation Age of Onset    Arthritis Mother     Early death Mother         64 at time of death    Heart attack Mother     Heart disease Mother     Heart failure Mother     Hypertension Mother     Coronary artery disease Father     Hearing loss Father     Heart disease Father     Hyperlipidemia Father     Hypertension Father     Mental illness Father     Learning disabilities Son        Social History:  Social History     Tobacco Use    Smoking status: Current Every Day Smoker      "Packs/day: 1.00     Years: 32.00     Pack years: 32.00     Types: Cigarettes     Start date: 1985     Last attempt to quit: 2017     Years since quittin.2    Smokeless tobacco: Never Used    Tobacco comment: 45 pack year history   Substance and Sexual Activity    Alcohol use: No    Drug use: No    Sexual activity: Yes     Partners: Male        Review of Systems     Review of Systems   Constitutional: Negative for chills and fever.        + generalized weakness  + feeling "hot and cold"   HENT: Negative for congestion and sore throat.    Respiratory: Negative for cough and shortness of breath.    Cardiovascular: Negative for chest pain.   Gastrointestinal: Positive for abdominal pain, diarrhea, nausea and vomiting. Negative for anal bleeding and constipation.   Genitourinary: Negative for dysuria, flank pain, frequency, hematuria and urgency.   Musculoskeletal: Negative for back pain and neck pain.   Skin: Negative for rash.   Neurological: Negative for dizziness, weakness, light-headedness and headaches.   Hematological: Does not bruise/bleed easily.   All other systems reviewed and are negative.     Physical Exam     Initial Vitals [19 2209]   BP Pulse Resp Temp SpO2   (!) 118/58 97 20 98.3 °F (36.8 °C) 95 %      MAP       --          Physical Exam   Nursing Notes and Vital Signs Reviewed.  Constitutional: Patient is in no acute distress. Well-developed and well-nourished.  Head: Atraumatic. Normocephalic.  Eyes: PERRL. EOM intact. Conjunctivae are not pale. No scleral icterus.  ENT: Mucous membranes are moist. Oropharynx is clear and symmetric.    Neck: Supple. Full ROM. No lymphadenopathy.  Cardiovascular: Regular rate. Regular rhythm. No murmurs, rubs, or gallops. Distal pulses are 2+ and symmetric.  Pulmonary/Chest: No respiratory distress. Clear to auscultation bilaterally. No wheezing or rales.  Abdominal: Soft and non-distended.  There is no tenderness.  No rebound, guarding, or " "rigidity. Good bowel sounds.  Genitourinary: No CVA tenderness  Musculoskeletal: Moves all extremities. No obvious deformities. No edema. No calf tenderness.  Skin: Warm and dry.  Neurological:  Alert, awake, and appropriate.  Normal speech.  No acute focal neurological deficits are appreciated.  Psychiatric: Normal affect. Good eye contact. Appropriate in content.     ED Course   Procedures  ED Vital Signs:  Vitals:    08/23/19 2209 08/23/19 2344 08/24/19 0100 08/24/19 0215   BP: (!) 118/58 (!) 141/89 139/85 127/72   Pulse: 97 76 79 77   Resp: 20 16 17 15   Temp: 98.3 °F (36.8 °C)  98 °F (36.7 °C)    TempSrc: Oral  Oral    SpO2: 95% 99% 98%    Weight: 103.3 kg (227 lb 10 oz)      Height: 5' 7" (1.702 m)          Abnormal Lab Results:  Labs Reviewed   CBC W/ AUTO DIFFERENTIAL - Abnormal; Notable for the following components:       Result Value    RBC 3.52 (*)     Mean Corpuscular Volume 107 (*)     Mean Corpuscular Hemoglobin 37.2 (*)     All other components within normal limits   COMPREHENSIVE METABOLIC PANEL - Abnormal; Notable for the following components:    CO2 22 (*)     Alkaline Phosphatase 49 (*)     eGFR if  51 (*)     eGFR if non  44 (*)     All other components within normal limits   URINALYSIS - Abnormal; Notable for the following components:    Specific Gravity, UA >=1.030 (*)     Occult Blood UA 3+ (*)     Leukocytes, UA Trace (*)     All other components within normal limits   URINALYSIS MICROSCOPIC - Abnormal; Notable for the following components:    RBC, UA 10 (*)     WBC, UA 30 (*)     Bacteria Many (*)     All other components within normal limits   AMYLASE   LIPASE        All Lab Results:  Results for orders placed or performed during the hospital encounter of 08/23/19   CBC auto differential   Result Value Ref Range    WBC 4.31 3.90 - 12.70 K/uL    RBC 3.52 (L) 4.00 - 5.40 M/uL    Hemoglobin 13.1 12.0 - 16.0 g/dL    Hematocrit 37.8 37.0 - 48.5 %    Mean " Corpuscular Volume 107 (H) 82 - 98 fL    Mean Corpuscular Hemoglobin 37.2 (H) 27.0 - 31.0 pg    Mean Corpuscular Hemoglobin Conc 34.7 32.0 - 36.0 g/dL    RDW 13.8 11.5 - 14.5 %    Platelets 286 150 - 350 K/uL    MPV 10.5 9.2 - 12.9 fL    Gran # (ANC) 2.2 1.8 - 7.7 K/uL    Lymph # 1.7 1.0 - 4.8 K/uL    Mono # 0.3 0.3 - 1.0 K/uL    Eos # 0.1 0.0 - 0.5 K/uL    Baso # 0.03 0.00 - 0.20 K/uL    Gran% 50.6 38.0 - 73.0 %    Lymph% 40.1 18.0 - 48.0 %    Mono% 6.5 4.0 - 15.0 %    Eosinophil% 2.3 0.0 - 8.0 %    Basophil% 0.7 0.0 - 1.9 %    Differential Method Automated    Comprehensive metabolic panel   Result Value Ref Range    Sodium 136 136 - 145 mmol/L    Potassium 3.8 3.5 - 5.1 mmol/L    Chloride 104 95 - 110 mmol/L    CO2 22 (L) 23 - 29 mmol/L    Glucose 98 70 - 110 mg/dL    BUN, Bld 17 6 - 20 mg/dL    Creatinine 1.4 0.5 - 1.4 mg/dL    Calcium 9.4 8.7 - 10.5 mg/dL    Total Protein 7.4 6.0 - 8.4 g/dL    Albumin 3.9 3.5 - 5.2 g/dL    Total Bilirubin 0.4 0.1 - 1.0 mg/dL    Alkaline Phosphatase 49 (L) 55 - 135 U/L    AST 23 10 - 40 U/L    ALT 25 10 - 44 U/L    Anion Gap 10 8 - 16 mmol/L    eGFR if African American 51 (A) >60 mL/min/1.73 m^2    eGFR if non African American 44 (A) >60 mL/min/1.73 m^2   Urinalysis - Clean Catch   Result Value Ref Range    Specimen UA Urine, Clean Catch     Color, UA Yellow Yellow, Straw, [place]    Appearance, UA Clear Clear    pH, UA 6.0 5.0 - 8.0    Specific Gravity, UA >=1.030 (A) 1.005 - 1.030    Protein, UA Negative Negative    Glucose, UA Negative Negative    Ketones, UA Negative Negative    Bilirubin (UA) Negative Negative    Occult Blood UA 3+ (A) Negative    Nitrite, UA Negative Negative    Urobilinogen, UA Negative <2.0 EU/dL    Leukocytes, UA Trace (A) Negative   Amylase   Result Value Ref Range    Amylase 69 20 - 110 U/L   Lipase   Result Value Ref Range    Lipase 41 4 - 60 U/L   Urinalysis Microscopic   Result Value Ref Range    RBC, UA 10 (H) 0 - 4 /hpf    WBC, UA 30 (H) 0 - 5 /hpf     Bacteria Many (A) None-Occ /hpf    Squam Epithel, UA 2 /hpf    Microscopic Comment SEE COMMENT        Imaging Results:  Imaging Results          CT Abdomen Pelvis  Without Contrast (Final result)  Result time 08/24/19 08:35:56    Final result by Dakota Ford MD (08/24/19 08:35:56)                 Impression:      CT of the abdomen and pelvis demonstrates no acute findings.  There is chronic fatty change of the liver.    All CT scans at (this location) are performed using dose modulation techniques as appropriate to a performed exam including the following:  automated exposure control; adjustment of the mA and /or kV according to patient size (this includes techniques or standardized protocols for targeted exams where dose is matched to indication/reason for exam: i.e. extremities or head); use of iterative reconstruction technique.      Electronically signed by: Dakota Ford  Date:    08/24/2019  Time:    08:35             Narrative:    EXAMINATION:  CT ABDOMEN PELVIS WITHOUT CONTRAST    CLINICAL HISTORY:  Abd pain, gastroenteritis or colitis suspected;    TECHNIQUE:  Limited noncontrast CT scan of the abdomen and pelvis utilizing renal stone protocol.    COMPARISON:  Previous CT scans are reviewed including abdomen CT of 02/18/2019    FINDINGS:  The liver appears free of focal abnormality.  There is diffuse fatty change present which is a chronic finding.  The gallbladder is surgically absent.  The spleen contains multiple small calcifications.  This is a stable chronic finding.    The kidneys and pancreas appear grossly normal. The visible bowel is grossly unremarkable.    No stones or hydronephrosis seen. No obvious free fluid is noted within the pelvis.                               1:11 AM: CT Abdomen Pelvis without Contrast:  Report reviewed by me.   Radiologist: Myriam Barker MD  IMPRESSION: No acute findings.     The EKG was ordered, reviewed, and independently interpreted by the ED  provider.  Interpretation time: 23:35  Rate: 81 BPM  Rhythm: normal sinus rhythm  Interpretation: Normal ECG. No acute ST/T wave changes. No STEMI.           The Emergency Provider reviewed the vital signs and test results, which are outlined above.     ED Discussion     1:37 AM: Reassessed pt at this time.  Pt states her condition has improved at this time. Discussed with pt all pertinent ED information and results. Discussed pt dx and plan of tx. Gave pt all f/u and return to the ED instructions. All questions and concerns were addressed at this time. Pt expresses understanding of information and instructions, and is comfortable with plan to discharge. Pt is stable for discharge.    I discussed with patient and/or family/caretaker that evaluation in the ED does not suggest any emergent or life threatening medical conditions requiring immediate intervention beyond what was provided in the ED, and I believe patient is safe for discharge.  Regardless, an unremarkable evaluation in the ED does not preclude the development or presence of a serious of life threatening condition. As such, patient was instructed to return immediately for any worsening or change in current symptoms.       Medical Decision Making:   Clinical Tests:   Lab Tests: Ordered and Reviewed  Radiological Study: Ordered and Reviewed  Medical Tests: Ordered and Reviewed           ED Medication(s):  Medications   dicyclomine capsule 10 mg (10 mg Oral Given 8/24/19 0029)       Discharge Medication List as of 8/24/2019  1:29 AM                  Scribe Attestation:   Scribe #1: I performed the above scribed service and the documentation accurately describes the services I performed. I attest to the accuracy of the note.     Attending:   Physician Attestation Statement for Scribe #1: I, Maranda Morgan MD, personally performed the services described in this documentation, as scribed by Kerri Fuller, in my presence, and it is both accurate and complete.            Clinical Impression       ICD-10-CM ICD-9-CM   1. Abdominal pain R10.9 789.00       Disposition:   Disposition: Discharged  Condition: Stable         Maranda Morgan MD  08/24/19 2034

## 2019-08-26 ENCOUNTER — TELEPHONE (OUTPATIENT)
Dept: EMERGENCY MEDICINE | Facility: HOSPITAL | Age: 49
End: 2019-08-26

## 2019-08-28 NOTE — ANESTHESIA RELEASE NOTE
If you have an active [facility] account, please look for your well child questionnaire to come to your [facility] account before your next well child visit.    Well-Child Checkup: 11 to 13 Years     Physical activity is key to lifelong good health. Encourage your child to find activities that he or she enjoys.     Between ages 11 and 13, your child will grow and change a lot. Its important to keep having yearly checkups so the healthcare provider can track this progress. As your child enters puberty, he or she may become more embarrassed about having a checkup. Reassure your child that the exam is normal and necessary. Be aware that the healthcare provider may ask to talk with the child without you in the exam room.  School and social issues  Here are some topics you, your child, and the healthcare provider may want to discuss during this visit:  · School performance. How is your child doing in school? Is homework finished on time? Does your child stay organized? These are skills you can help with. Keep in mind that a drop in school performance can be a sign of other problems.  · Friendships. Do you like your childs friends? Do the friendships seem healthy? Make sure to talk to your child about who his or her friends are and how they spend time together. This is the age when peer pressure can start to be a problem.  · Life at home. How is your childs behavior? Does he or she get along with others in the family? Is he or she respectful of you, other adults, and authority? Does your child participate in family events, or does he or she withdraw from other family members?  · Risky behaviors. Its not too early to start talking to your child about drugs, alcohol, smoking, and sex. Make sure your child understands that these are not activities he or she should do, even if friends are. Answer your childs questions, and dont be afraid to ask questions of your own. Make sure your child knows he or she can always come 
"Anesthesia Release from PACU Note    Patient: Josey Flores    Procedure(s) Performed: Procedure(s) (LRB):  EXCHANGE-WOUND VAC (Bilateral)    Anesthesia type: general    Post pain: Adequate analgesia    Post assessment: no apparent anesthetic complications, tolerated procedure well and no evidence of recall    Last Vitals:   Visit Vitals    /71 (BP Location: Left arm, Patient Position: Lying, BP Method: Automatic)    Pulse 78    Temp 36.6 °C (97.9 °F) (Oral)    Resp 18    Ht 5' 7" (1.702 m)    Wt 93.9 kg (207 lb)    LMP  (LMP Unknown)    SpO2 96%    Breastfeeding No    BMI 32.42 kg/m2       Post vital signs: stable    Level of consciousness: awake, alert  and oriented    Nausea/Vomiting: no nausea/no vomiting    Complications: none    Airway Patency: patent    Respiratory: unassisted, spontaneous ventilation, room air    Cardiovascular: stable and blood pressure at baseline    Hydration: euvolemic  "
to you for help. If youre not sure how to approach these topics, talk to the healthcare provider for advice.  Entering puberty  Puberty is the stage when a child begins to develop sexually into an adult. It usually starts between 9 and 14 for girls, and between 12 and 16 for boys. Here is some of what you can expect when puberty begins:  · Acne and body odor. Hormones that increase during puberty can cause acne (pimples) on the face and body. Hormones can also increase sweating and cause a stronger body odor. At this age, your child should begin to shower or bathe daily. Encourage your child to use deodorant and acne products as needed.  · Body changes in girls. Early in puberty, breasts begin to develop. One breast often starts to grow before the other. This is normal. Hair begins to grow in the pubic area, under the arms, and on the legs. Around 2 years after breasts begin to grow, a girl will start having monthly periods (menstruation). To help prepare your daughter for this change, talk to her about periods, what to expect, and how to use feminine products.  · Body changes in boys. At the start of puberty, the testicles drop lower and the scrotum darkens and becomes looser. Hair begins to grow in the pubic area, under the arms, and on the legs, chest, and face. The voice changes, becoming lower and deeper. As the penis grows and matures, erections and wet dreams begin to happen. Reassure your son that this is normal.  · Emotional changes. Along with these physical changes, youll likely notice changes in your childs personality. You may notice your child developing an interest in dating and becoming more than friends with others. Also, many kids become downs and develop an attitude around puberty. This can be frustrating, but it is very normal. Try to be patient and consistent. Encourage conversations, even when your child doesnt seem to want to talk. No matter how your child acts, he or she still needs a 
parent.  Nutrition and exercise tips  Today, kids are less active and eat more junk food than ever before. Your child is starting to make choices about what to eat and how active to be. You cant always have the final say, but you can help your child develop healthy habits. Here are some tips:  · Help your child get at least 30 to 60 minutes of activity every day. The time can be broken up throughout the day. If the weathers bad or youre worried about safety, find supervised indoor activities.   · Limit screen time to 1 hour each day. This includes time spent watching TV, playing video games, using the computer, and texting. If your child has a TV, computer, or video game console in the bedroom, consider replacing it with a music player. For many kids, dancing and singing are fun ways to get moving.  · Limit sugary drinks. Soda, juice, and sports drinks lead to unhealthy weight gain and tooth decay. Water and low-fat or nonfat milk are best to drink. In moderation (no more than 8 to 12 ounces daily), 100% fruit juice is OK. Save soda and other sugary drinks for special occasions.  · Have at least one family meal together each day. Busy schedules often limit time for sitting and talking. Sitting and eating together allows for family time. It also lets you see what and how your child eats.  · Pay attention to portions. Serve portions that make sense for your kids. Let them stop eating when theyre full--dont make them clean their plates. Be aware that many kids appetites increase during puberty. If your child is still hungry after a meal, offer seconds of vegetables or fruit.  · Serve and encourage healthy foods. Your child is making more food decisions on his or her own. All foods have a place in a balanced diet. Fruits, vegetables, lean meats, and whole grains should be eaten every day. Save less healthy foods--like french fries, candy, and chips--for a special occasion. When your child does choose to eat junk 
"food, consider making the child buy it with his or her own money. Ask your child to tell you when he or she buys junk food or swaps food with friends.  · Bring your child to the dentist at least twice a year for teeth cleaning and a checkup.  Sleeping tips  At this age, your child needs about 10 hours of sleep each night. Here are some tips:  · Set a bedtime and make sure your child follows it each night.  · TV, computer, and video games can agitate a child and make it hard to calm down for the night. Turn them off the at least an hour before bed. Instead, encourage your child to read before bed.  · If your child has a cell phone, make sure its turned off at night.  · Dont let your child go to sleep very late or sleep in on weekends. This can disrupt sleep patterns and make it harder to sleep on school nights.  · Remind your child to brush and floss his or her teeth before bed. Briefly supervise your child's dental self-care once a week to make sure of proper technique.  Safety tips  Recommendations for keeping your child safe include the following:   · When riding a bike, roller-skating, or using a scooter or skateboard, your child should wear a helmet with the strap fastened. When using roller skates, a scooter, or a skateboard, it is also a good idea for your child to wear wrist guards, elbow pads, and knee pads.  · In the car, all children younger than 13 should sit in the back seat. Children shorter than 4'9" (57 inches) should continue to use a booster seat to properly position the seat belt.  · If your child has a cell phone or portable music player, make sure these are used safely and responsibly. Do not allow your child to talk on the phone, text, or listen to music with headphones while he or she is riding a bike or walking outdoors. Remind your child to pay special attention when crossing the street.  · Constant loud music can cause hearing damage, so monitor the volume on your childs music player. "
Many players let you set a limit for how loud the volume can be turned up. Check the directions for details.  · At this age, kids may start taking risks that could be dangerous to their health or well-being. Sometimes bad decisions stem from peer pressure. Other times, kids just dont think ahead about what could happen. Teach your child the importance of making good decisions. Talk about how to recognize peer pressure and come up with strategies for coping with it.  · Sudden changes in your childs mood, behavior, friendships, or activities can be warning signs of problems at school or in other aspects of your childs life. If you notice signs like these, talk to your child and to the staff at your childs school. The healthcare provider may also be able to offer advice.  Vaccines  Based on recommendations from the American Association of Pediatrics, at this visit your child may receive the following vaccines:  · Human papillomavirus (HPV) (ages 11 to 12)  · Influenza (flu), annually  · Meningococcal (ages 11 to 12)  · Tetanus, diphtheria, and pertussis (ages 11 to 12)  Stay on top of social media  In this wired age, kids are much more connected with friends--possibly some theyve never met in person. To teach your child how to use social media responsibly:  · Set limits for the use of cell phones, the computer, and the Internet. Remind your child that you can check the web browser history and cell phone logs to know how these devices are being used. Use parental controls and passwords to block access to inappropriate websites. Use privacy settings on websites so only your childs friends can view his or her profile.  · Explain to your child the dangers of giving out personal information online. Teach your child not to share his or her phone number, address, picture, or other personal details with online friends without your permission.  · Make sure your child understands that things he or she says on the 
Internet are never private. Posts made on websites like Facebook, [employer], and Twitter can be seen by people they werent intended for. Posts can easily be misunderstood and can even cause trouble for you or your child. Supervise your childs use of social networks, chat rooms, and email.      Next checkup at: _______________________________     PARENT NOTES:  Date Last Reviewed: 12/1/2016 © 2000-2017 [employer]. [address] [place] [address]. All rights reserved. This information is not intended as a substitute for professional medical care. Always follow your healthcare professional's instructions.          Well-Child Checkup: 14 to 18 Years     Stay involved in your teens life. Make sure your teen knows youre always there when he or she needs to talk.     During the teen years, its important to keep having yearly checkups. Your teen may be embarrassed about having a checkup. Reassure your teen that the exam is normal and necessary. Be aware that the healthcare provider may ask to talk with your child without you in the exam room.  School and social issues  Here are some topics you, your teen, and the healthcare provider may want to discuss during this visit:  · School performance. How is your child doing in school? Is homework finished on time? Does your child stay organized? These are skills you can help with. Keep in mind that a drop in school performance can be a sign of other problems.  · Friendships. Do you like your childs friends? Do the friendships seem healthy? Make sure to talk to your teen about who his or her friends are and how they spend time together. Peer pressure can be a problem among teenagers.  · Life at home. How is your childs behavior? Does he or she get along with others in the family? Is he or she respectful of you, other adults, and authority? Does your child participate in family events, or does he or she withdraw from other family members?  · Risky 
behaviors. Many teenagers are curious about drugs, alcohol, smoking, and sex. Talk openly about these issues. Answer your childs questions, and dont be afraid to ask questions of your own. If youre not sure how to approach these topics, talk to the healthcare provider for advice.   Puberty  Your teen may still be experiencing some of the changes of puberty, such as:  · Acne and body odor. Hormones that increase during puberty can cause acne (pimples) on the face and body. Hormones can also increase sweating and cause a stronger body odor.  · Body changes. The body grows and matures during puberty. Hair will grow in the pubic area and on other parts of the body. Girls grow breasts and menstruate (have monthly periods). A boys voice changes, becoming lower and deeper. As the penis matures, erections and wet dreams will start to happen. Talk to your teen about what to expect, and help him or her deal with these changes when possible.  · Emotional changes. Along with these physical changes, youll likely notice changes in your teens personality. He or she may develop an interest in dating and becoming more than friends with other kids. Also, its normal for your teen to be downs. Try to be patient and consistent. Encourage conversations, even when he or she doesnt seem to want to talk. No matter how your teen acts, he or she still needs a parent.  Nutrition and exercise tips  Your teenager likely makes his or her own decisions about what to eat and how to spend free time. You cant always have the final say, but you can encourage healthy habits. Your teen should:  · Get at least 30 to 60 minutes of physical activity every day. This time can be broken up throughout the day. After-school sports, dance or martial arts classes, riding a bike, or even walking to school or a friends house counts as activity.    · Limit screen time to 1 hour each day. This includes time spent watching TV, playing video games, using 
the computer, and texting. If your teen has a TV, computer, or video game console in the bedroom, consider replacing it with a music player.   · Eat healthy. Your child should eat fruits, vegetables, lean meats, and whole grains every day. Less healthy foods--like french fries, candy, and chips--should be eaten rarely. Some teens fall into the trap of snacking on junk food and fast food throughout the day. Make sure the kitchen is stocked with healthy choices for after-school snacks. If your teen does choose to eat junk food, consider making him or her buy it with his or her own money.   · Eat 3 meals a day. Many kids skip breakfast and even lunch. Not only is this unhealthy, it can also hurt school performance. Make sure your teen eats breakfast. If your teen does not like the food served at school for lunch, allow him or her to prepare a bag lunch.  · Have at least one family meal with you each day. Busy schedules often limit time for sitting and talking. Sitting and eating together allows for family time. It also lets you see what and how your child eats.   · Limit soda and juice drinks. A small soda is OK once in a while. But soda, sports drinks, and juice drinks are no substitute for healthier drinks. Sports and juice drinks are no better. Water and low-fat or nonfat milk are the best choices.  Hygiene tips  Recommendations for good hygiene include the following:   · Teenagers should bathe or shower daily and use deodorant.  · Let the healthcare provider know if you or your teen have questions about hygiene or acne.  · Bring your teen to the dentist at least twice a year for teeth cleaning and a checkup.  · Remind your teen to brush and floss his or her teeth before bed.  Sleeping tips  During the teen years, sleep patterns may change. Many teenagers have a hard time falling asleep. This can lead to sleeping late the next morning. Here are some tips to help your teen get the rest he or she needs:  · Encourage 
your teen to keep a consistent bedtime, even on weekends. Sleeping is easier when the body follows a routine. Dont let your teen stay up too late at night or sleep in too long in the morning.  · Help your teen wake up, if needed. Go into the bedroom, open the blinds, and get your teen out of bed -- even on weekends or during school vacations.  · Being active during the day will help your child sleep better at night.  · Discourage use of the TV, computer, or video games for at least an hour before your teen goes to bed. (This is good advice for parents, too!)  · Make a rule that cell phones must be turned off at night.  Safety tips  Recommendations to keep your teen safe include the following:  · Set rules for how your teen can spend time outside of the house. Give your child a nighttime curfew. If your child has a cell phone, check in periodically by calling to ask where he or she is and what he or she is doing.  · Make sure cell phones and portable music players are used safely and responsibly. Help your teen understand that it is dangerous to talk on the phone, text, or listen to music with headphones while he or she is riding a bike or walking outdoors, especially when crossing the street.  · Constant loud music can cause hearing damage, so monitor your teens music volume. Many music players let you set a limit for how loud the volume can be turned up. Check the directions for details.  · When your teen is old enough for a s license, encourage safe driving. Teach your teen to always wear a seat belt, drive the speed limit, and follow the rules of the road. Do not allow your teenager to text or talk on a cell phone while driving. (And dont do this yourself! Remember, you set an example.)  · Set rules and limits around driving and use of the car. If your teen gets a ticket or has an accident, there should be consequences. Driving is a privilege that can be taken away if your child doesnt follow the 
rules.  · Teach your child to make good decisions about drugs, alcohol, sex, and other risky behaviors. Work together to come up with strategies for staying safe and dealing with peer pressure. Make sure your teenager knows he or she can always come to you for help.  Tests and vaccines  If you have a strong family history of high cholesterol, your teens blood cholesterol may be tested at this visit. Based on recommendations from the CDC, at this visit your child may receive the following vaccines:  · Meningococcal  · Influenza (flu), annually  Recognizing signs of depression  Its normal for teenagers to have extreme mood swings as a result of their changing hormones. Its also just a part of growing up. But sometimes a teenagers mood swings are signs of a larger problem. If your teen seems depressed for more than 2 weeks, you should be concerned. Signs of depression include:  · Use of drugs or alcohol  · Problems in school and at home  · Frequent episodes of running away  · Thoughts or talk of death or suicide  · Withdrawal from family and friends  · Sudden changes in eating or sleeping habits  · Sexual promiscuity or unplanned pregnancy  · Hostile behavior or rage  · Loss of pleasure in life  Depressed teens can be helped with treatment. Talk to your childs healthcare provider. Or check with your local mental health center, social service agency, or hospital. Assure your teen that his or her pain can be eased. Offer your love and support. If your teen talks about death or suicide, seek help right away.      Next checkup at: _______________________________     PARENT NOTES:  Date Last Reviewed: 12/1/2016 © 2000-2017 [employer]. [address] [place] [address], [place] [address]. All rights reserved. This information is not intended as a substitute for professional medical care. Always follow your healthcare professional's instructions.        
Declined

## 2019-09-04 ENCOUNTER — TELEPHONE (OUTPATIENT)
Dept: HEMATOLOGY/ONCOLOGY | Facility: CLINIC | Age: 49
End: 2019-09-04

## 2019-09-04 DIAGNOSIS — F41.9 ANXIETY: ICD-10-CM

## 2019-09-04 RX ORDER — ALPRAZOLAM 0.5 MG/1
0.5 TABLET ORAL 2 TIMES DAILY PRN
Qty: 60 TABLET | Refills: 0 | Status: SHIPPED | OUTPATIENT
Start: 2019-09-04 | End: 2019-10-03 | Stop reason: SDUPTHER

## 2019-09-09 DIAGNOSIS — C77.3 MALIGNANT NEOPLASM METASTATIC TO LYMPH NODE OF AXILLA: ICD-10-CM

## 2019-09-09 DIAGNOSIS — Z17.0 MALIGNANT NEOPLASM OF OVERLAPPING SITES OF RIGHT BREAST IN FEMALE, ESTROGEN RECEPTOR POSITIVE: ICD-10-CM

## 2019-09-09 DIAGNOSIS — G89.3 NEOPLASM RELATED PAIN: ICD-10-CM

## 2019-09-09 DIAGNOSIS — C50.811 MALIGNANT NEOPLASM OF OVERLAPPING SITES OF RIGHT BREAST IN FEMALE, ESTROGEN RECEPTOR POSITIVE: ICD-10-CM

## 2019-09-09 DIAGNOSIS — C79.51 SECONDARY CANCER OF BONE: ICD-10-CM

## 2019-09-09 RX ORDER — HYDROCODONE BITARTRATE AND ACETAMINOPHEN 7.5; 325 MG/1; MG/1
1 TABLET ORAL EVERY 6 HOURS PRN
Qty: 120 TABLET | Refills: 0 | Status: SHIPPED | OUTPATIENT
Start: 2019-09-09 | End: 2019-10-07 | Stop reason: SDUPTHER

## 2019-09-11 ENCOUNTER — HOSPITAL ENCOUNTER (OUTPATIENT)
Dept: RADIOLOGY | Facility: HOSPITAL | Age: 49
Discharge: HOME | End: 2019-09-11
Attending: INTERNAL MEDICINE
Payer: MEDICAID

## 2019-09-11 DIAGNOSIS — Z17.0 MALIGNANT NEOPLASM OF OVERLAPPING SITES OF RIGHT BREAST IN FEMALE, ESTROGEN RECEPTOR POSITIVE: ICD-10-CM

## 2019-09-11 DIAGNOSIS — C50.811 MALIGNANT NEOPLASM OF OVERLAPPING SITES OF RIGHT BREAST IN FEMALE, ESTROGEN RECEPTOR POSITIVE: ICD-10-CM

## 2019-09-11 DIAGNOSIS — C79.51 SECONDARY CANCER OF BONE: ICD-10-CM

## 2019-09-11 DIAGNOSIS — R05.9 COUGH: ICD-10-CM

## 2019-09-11 PROCEDURE — 71260 CT THORAX DX C+: CPT | Mod: TC

## 2019-09-11 PROCEDURE — 74177 CT ABD & PELVIS W/CONTRAST: CPT | Mod: TC

## 2019-09-11 PROCEDURE — 25500020 PHARM REV CODE 255: Performed by: INTERNAL MEDICINE

## 2019-09-11 PROCEDURE — 71100 X-RAY EXAM RIBS UNI 2 VIEWS: CPT | Mod: TC,RT

## 2019-09-11 RX ADMIN — IOHEXOL 30 ML: 350 INJECTION, SOLUTION INTRAVENOUS at 01:09

## 2019-09-11 RX ADMIN — IOHEXOL 75 ML: 350 INJECTION, SOLUTION INTRAVENOUS at 01:09

## 2019-09-26 ENCOUNTER — LAB VISIT (OUTPATIENT)
Dept: LAB | Facility: HOSPITAL | Age: 49
End: 2019-09-26
Attending: INTERNAL MEDICINE
Payer: MEDICAID

## 2019-09-26 ENCOUNTER — OFFICE VISIT (OUTPATIENT)
Dept: HEMATOLOGY/ONCOLOGY | Facility: CLINIC | Age: 49
End: 2019-09-26
Payer: MEDICAID

## 2019-09-26 VITALS
BODY MASS INDEX: 35.82 KG/M2 | TEMPERATURE: 98 F | WEIGHT: 228.19 LBS | OXYGEN SATURATION: 98 % | HEART RATE: 85 BPM | HEIGHT: 67 IN | DIASTOLIC BLOOD PRESSURE: 86 MMHG | SYSTOLIC BLOOD PRESSURE: 130 MMHG

## 2019-09-26 DIAGNOSIS — F41.1 GENERALIZED ANXIETY DISORDER: ICD-10-CM

## 2019-09-26 DIAGNOSIS — C73 PAPILLARY THYROID CARCINOMA: ICD-10-CM

## 2019-09-26 DIAGNOSIS — Z17.0 MALIGNANT NEOPLASM OF OVERLAPPING SITES OF RIGHT BREAST IN FEMALE, ESTROGEN RECEPTOR POSITIVE: Primary | ICD-10-CM

## 2019-09-26 DIAGNOSIS — C79.51 SECONDARY CANCER OF BONE: ICD-10-CM

## 2019-09-26 DIAGNOSIS — C50.811 MALIGNANT NEOPLASM OF OVERLAPPING SITES OF RIGHT BREAST IN FEMALE, ESTROGEN RECEPTOR POSITIVE: ICD-10-CM

## 2019-09-26 DIAGNOSIS — R05.9 COUGH: ICD-10-CM

## 2019-09-26 DIAGNOSIS — G89.3 NEOPLASM RELATED PAIN: ICD-10-CM

## 2019-09-26 DIAGNOSIS — C50.811 MALIGNANT NEOPLASM OF OVERLAPPING SITES OF RIGHT BREAST IN FEMALE, ESTROGEN RECEPTOR POSITIVE: Primary | ICD-10-CM

## 2019-09-26 DIAGNOSIS — Z17.0 MALIGNANT NEOPLASM OF OVERLAPPING SITES OF RIGHT BREAST IN FEMALE, ESTROGEN RECEPTOR POSITIVE: ICD-10-CM

## 2019-09-26 LAB
ALBUMIN SERPL BCP-MCNC: 3.8 G/DL (ref 3.5–5.2)
ALP SERPL-CCNC: 59 U/L (ref 55–135)
ALT SERPL W/O P-5'-P-CCNC: 34 U/L (ref 10–44)
ANION GAP SERPL CALC-SCNC: 10 MMOL/L (ref 8–16)
AST SERPL-CCNC: 25 U/L (ref 10–40)
BASOPHILS # BLD AUTO: 0.04 K/UL (ref 0–0.2)
BASOPHILS NFR BLD: 1 % (ref 0–1.9)
BILIRUB SERPL-MCNC: 0.4 MG/DL (ref 0.1–1)
BUN SERPL-MCNC: 10 MG/DL (ref 6–20)
CALCIUM SERPL-MCNC: 9.6 MG/DL (ref 8.7–10.5)
CHLORIDE SERPL-SCNC: 105 MMOL/L (ref 95–110)
CO2 SERPL-SCNC: 27 MMOL/L (ref 23–29)
CREAT SERPL-MCNC: 1.4 MG/DL (ref 0.5–1.4)
DIFFERENTIAL METHOD: ABNORMAL
EOSINOPHIL # BLD AUTO: 0.1 K/UL (ref 0–0.5)
EOSINOPHIL NFR BLD: 1.4 % (ref 0–8)
ERYTHROCYTE [DISTWIDTH] IN BLOOD BY AUTOMATED COUNT: 13.7 % (ref 11.5–14.5)
EST. GFR  (AFRICAN AMERICAN): 51 ML/MIN/1.73 M^2
EST. GFR  (NON AFRICAN AMERICAN): 44 ML/MIN/1.73 M^2
GLUCOSE SERPL-MCNC: 102 MG/DL (ref 70–110)
HCT VFR BLD AUTO: 37.6 % (ref 37–48.5)
HGB BLD-MCNC: 13 G/DL (ref 12–16)
LYMPHOCYTES # BLD AUTO: 1.5 K/UL (ref 1–4.8)
LYMPHOCYTES NFR BLD: 36.5 % (ref 18–48)
MCH RBC QN AUTO: 37.5 PG (ref 27–31)
MCHC RBC AUTO-ENTMCNC: 34.6 G/DL (ref 32–36)
MCV RBC AUTO: 108 FL (ref 82–98)
MONOCYTES # BLD AUTO: 0.2 K/UL (ref 0.3–1)
MONOCYTES NFR BLD: 4.5 % (ref 4–15)
NEUTROPHILS # BLD AUTO: 2.4 K/UL (ref 1.8–7.7)
NEUTROPHILS NFR BLD: 56.8 % (ref 38–73)
PLATELET # BLD AUTO: 283 K/UL (ref 150–350)
PMV BLD AUTO: 10.5 FL (ref 9.2–12.9)
POTASSIUM SERPL-SCNC: 4.7 MMOL/L (ref 3.5–5.1)
PROT SERPL-MCNC: 7.1 G/DL (ref 6–8.4)
RBC # BLD AUTO: 3.47 M/UL (ref 4–5.4)
SODIUM SERPL-SCNC: 142 MMOL/L (ref 136–145)
WBC # BLD AUTO: 4.19 K/UL (ref 3.9–12.7)

## 2019-09-26 PROCEDURE — 99214 PR OFFICE/OUTPT VISIT, EST, LEVL IV, 30-39 MIN: ICD-10-PCS | Mod: S$PBB,,, | Performed by: INTERNAL MEDICINE

## 2019-09-26 PROCEDURE — 99214 OFFICE O/P EST MOD 30 MIN: CPT | Mod: S$PBB,,, | Performed by: INTERNAL MEDICINE

## 2019-09-26 PROCEDURE — 99214 OFFICE O/P EST MOD 30 MIN: CPT | Mod: PBBFAC,25 | Performed by: INTERNAL MEDICINE

## 2019-09-26 PROCEDURE — 99999 PR PBB SHADOW E&M-EST. PATIENT-LVL IV: ICD-10-PCS | Mod: PBBFAC,,, | Performed by: INTERNAL MEDICINE

## 2019-09-26 PROCEDURE — 36415 COLL VENOUS BLD VENIPUNCTURE: CPT

## 2019-09-26 PROCEDURE — 90471 IMMUNIZATION ADMIN: CPT | Mod: PBBFAC,VFC

## 2019-09-26 PROCEDURE — 80053 COMPREHEN METABOLIC PANEL: CPT

## 2019-09-26 PROCEDURE — 99999 PR PBB SHADOW E&M-EST. PATIENT-LVL IV: CPT | Mod: PBBFAC,,, | Performed by: INTERNAL MEDICINE

## 2019-09-26 PROCEDURE — 85025 COMPLETE CBC W/AUTO DIFF WBC: CPT

## 2019-09-26 NOTE — PROGRESS NOTES
Reason for visit: Right breast carcinoma  Date of Diagnosis: January 12, 2017    HPI:   The patient is a 48-year-old  female who presents to the hematology oncology clinic today for follow up for metastatic right breast carcinoma.    I have reviewed all of the patient's relevant clinical history available in the medical record and have utilized this in my evaluation and management recommendations today including the details of her recent visit to [facility] [place] [facility] on September 15, 2018.  The patient had 2 separate areas in the right breast that were biopsied area the first was a right breast mass at 11:00 which was 3 cm from the nipple which showed invasive mammary carcinoma.  The invasive carcinoma measured at least 1.2 cm in greatest dimension and appeared high-grade.  This tumor was ER positive/NC positive/HER-2 negative.  The second breast mass was biopsied from the retroperitoneal area lower area and was also positive for invasive mammary carcinoma.  This measured at least 0.4 cm and also appeared high-grade.  The tumor is morphologically very similar to the tumor in the prior specimen.  Receptor studies were not done on this specimen.  The patient also had a palpable right axillary lymph node which was biopsied and showed metastatic mammary carcinoma which measured at least 1.3 cm in greatest dimension.  The patient had self palpated this breast mass.  In addition the patient has a palpable mass in the region of her right elbow which she reports has been present for 9 years but had been slowly growing especially over the past year or 2.  She denies any pain associated with this.    She is also status post total thyroidectomy for papillary thyroid carcinoma.  Today the patient reports that she has been having a recurrent rash on her face.  This has improved with 2 courses of p.o. methylprednisolone as an outpatient but seems to be a recurrent issue in the past couple of  months.  She also reports feeling tired and fatigued.  She reports having trouble sleeping at night.  She reports chronic low  back pain.  She has been having chronic problems with anxiety and depression.  However she denies any suicidal or homicidal ideation.  She is taking norco PRN neoplasm related pain.  She denies any fevers or chills. Denies night sweats.  She denies any melena, hematochezia, hematemesis, hemoptysis or hematuria. She reports intermittent non productive cough.  She continues on letrozole. Ibrance was started on 2/21/17.     PAST MEDICAL HISTORY:   1. HSIL on pap smear s/p LEEP  2. Retinal hemorrhage in left eye    SURGICAL HISTORY:   1.  Tonsillectomy and adenoidectomy  2.  Appendectomy  3.  Cholecystectomy  4.  D&C  5.  Thyroidectomy on August 31, 2017  6.  Right axillary hydradenitis/cellulitis and left inguinal hydradenitis/cellulitis s/p debridement and skin grafting    FAMILY HISTORY: She reports that her maternal aunt had lung cancer in her 70s.  She used to smoke cigarettes.  Her maternal cousin had lung cancer in her 60s.  She used to smoke cigarettes.  She denies any other immediate family members with cancer or bleeding/clotting disorders.    SOCIAL HISTORY: She reports a 46+ pack year smoking history. She drinks wine occasionally.  She denies any history of recreational drug use.  She is engaged and has 2 sons.  She used to work for [employer].  She lives in [place], [place].     ALLERGIES: [NKDA]    MEDICATIONS: [Medcard has been reviewed and/or reconciled.]    REVIEW OF SYSTEMS:   GENERAL: [No fevers, chills or sweats. Reports fatigue. Denies weight loss. Denies loss of appetite.]  HEENT: [No blurred vision, tinnitus. Reports sinus discharge is improved. Reports sorethroat is improved. Denies dysphagia.]  HEART: [No chest pain, palpitations or shortness of breath.]    LUNGS: [Reports chronic cough. Denies hemoptysis or breathing problems.]  ABDOMEN: [No abdominal pain,  nausea, vomiting, diarrhea, constipation or melena.]  GENITOURINARY: [No dysuria, bleeding or malodorous discharge.]  NEURO: [Reports headache. Denies dizziness or vertigo.]  HEMATOLOGY: [No easy bruising, spontaneous bleeding or blood clots in the past].  MUSCULOSKELETAL: [Reports arthralgias, myalgias and bone pains.]  SKIN: [Reports rash on face.]  PSYCHIATRY: [Reports depression. Reports anxiety.]  Denies suicidal/homicidal ideation.    PHYSICAL EXAMINATION:   VS: Reviewed on nurse's notes.  APPEARANCE: The patient is a well-developed, well-nourished and well-groomed  female who appears in no acute distress.   HEENT: No scleral icterus. Both external auditory canals clear. No oral ulcers, lesions. Throat clear  HEAD: No sinus tenderness.  NECK: Supple. No palpable lymphadenopathy. Thyroid non-tender, no palpable masses  CHEST: Breath sounds clear bilaterally. No rales. No rhonchi. Unlabored respirations.  BREAST: Deferred today.  CARDIOVASCULAR: Normal S1, S2. Normal rate. Regular rhythm.  ABDOMEN: Bowel sounds normal. No tenderness. No abdominal distention. No hepatomegaly. No splenomegaly.  BACK: Palpable tenderness in the lower back.  LYMPHATIC: No palpable supraclavicular lymphadenopathy.   EXTREMITIES: No clubbing, cyanosis. Trace edema in bilateral lower extremities.      SKIN:  Mild urticarial skin rash noted on face   NEUROLOGIC: No focal findings. Alert & Oriented x 3. Mood appropriate to affect    LABS:   Reviewed    IMAGING:  Reviewed    IMPRESSION:  1.  Metastatic multifocal infiltrating ductal carcinoma of the right breast with right axillary and bone involvement [ER positive/HI positive/HER-2 negative]  2.  Papillary thyroid carcinoma  3.  Tobacco abuse  4.  Anxiety and depression  5.  Vitamin B12 deficiency-improved  6.  Folic acid deficiency-improved    PLAN:  1.  I had a detailed discussion with the patient previously with regard to the diagnosis, prognosis and treatment options for  metastatic multifocal invasive ductal carcinoma of the right breast with right axillary and bone involvement.  2.  I have discussed that at this time her metastatic malignancy is potentially treatable but not curable.  3.  The patient was previously encouraged to quit smoking.  She expressed understanding.  Prescription for Xanax when necessary anxiety was previously given to the patient after full discussion of sedation precautions.    4. FSH and estradiol levels confirm menopausal status. TSH lab was normal.   5.  MRI of the brain on 1/31/17 to evaluate for any evidence of metastatic disease to complete staging workup was negative for metastatic malignancy to the brain. CT head done in March 2017 in ED was also ok.  6.  At this time I have recommended that she continue ibrance and continue letrozole as well.  7. She has been advised to take calcium and vit d supplementation everyday. She is on xgeva injections for treatment of bone involvement by malignancy. Risks/benefits and common side effects discussed and she expressed understanding and agreement. She has dentures.  She will get Xgeva q3 months.  8. Continue Lasix PRN for treatment of bilateral lower extremity edema.  Continue supplemental potassium was given as well.  Advised to use compression stockings and elevate legs.  9.  She will continue follow up with with Dr. Jason with endocrinology for follow up of papillary thyroid carcinoma.    10.  Results of MRI of the thoracic and lumbar spine from 9/21/17 for evaluation of current acute symptoms related to back pain reviewed in detail with pateint. No acute process involving the spinal cord is noted. She does have bone involvement by malignancy in multiple areas. However the PET/CT done on 9/27/17 shows no evidence of new areas of bony or visceral involvement.  Hence there appears to be no evidence of metastatic disease progression.  However since the patient continues to have low back pain and MRI of lumbar  spine does show enhancing marrow replacing metastatic lesions are seen in the bilateral iliac bones posteriorly. She did see radiation oncology and completed palliative XRT to this location which helped significantly with her symptoms.    11.  The patient will continue follow up with Dr. Guerin with clinical psychology for her chronic anxiety and depression.  The patient has been advised to seek immediate medical attention if any suicidal/homicidal ideation.  She expressed understanding.  12. CT scans done in Sep 2019 shows that her metastatic breast cancer continues to be stable/well controlled. I will plan to recheck again in March 2020.    13. She will continue potassium supplementation as recommended.  14.  She will continue oral vitamin B12 supplementation for the rest of her life.  The rationale for this was reviewed in detail and she expressed understanding. I will follow up with pending labs.    Return to clinic in 1 month with labs. She will be due for xgeva at follow up. She knows to call sooner for any new problems or questions.    Richard Delong MD

## 2019-10-03 ENCOUNTER — CLINICAL SUPPORT (OUTPATIENT)
Dept: SMOKING CESSATION | Facility: CLINIC | Age: 49
End: 2019-10-03
Payer: COMMERCIAL

## 2019-10-03 DIAGNOSIS — F41.9 ANXIETY: ICD-10-CM

## 2019-10-03 DIAGNOSIS — F17.200 NICOTINE DEPENDENCE: Primary | ICD-10-CM

## 2019-10-03 PROCEDURE — 99407 BEHAV CHNG SMOKING > 10 MIN: CPT | Mod: S$GLB,,,

## 2019-10-03 PROCEDURE — 99407 PR TOBACCO USE CESSATION INTENSIVE >10 MINUTES: ICD-10-PCS | Mod: S$GLB,,,

## 2019-10-03 RX ORDER — ALPRAZOLAM 0.5 MG/1
0.5 TABLET ORAL 2 TIMES DAILY PRN
Qty: 60 TABLET | Refills: 0 | Status: SHIPPED | OUTPATIENT
Start: 2019-10-03 | End: 2019-10-30 | Stop reason: SDUPTHER

## 2019-10-03 NOTE — PROGRESS NOTES
Called pt to f/u on her 3 month smoking cessation quit status. Pt stated she is still smoking, but has cut down from 1 ppd to 1/2 ppd since she has been on the Chantix. Informed her she has benefits available and is able to rejoin. Patient scheduled appointment for 10/8/19.  Informed her of benefit period, phone follow ups, and contact information. Will complete 3 month smart form and will continue to follow up on quit #2 episode.

## 2019-10-07 DIAGNOSIS — C50.811 MALIGNANT NEOPLASM OF OVERLAPPING SITES OF RIGHT BREAST IN FEMALE, ESTROGEN RECEPTOR POSITIVE: ICD-10-CM

## 2019-10-07 DIAGNOSIS — Z17.0 MALIGNANT NEOPLASM OF OVERLAPPING SITES OF RIGHT BREAST IN FEMALE, ESTROGEN RECEPTOR POSITIVE: ICD-10-CM

## 2019-10-07 DIAGNOSIS — C77.3 MALIGNANT NEOPLASM METASTATIC TO LYMPH NODE OF AXILLA: ICD-10-CM

## 2019-10-07 DIAGNOSIS — C79.51 SECONDARY CANCER OF BONE: ICD-10-CM

## 2019-10-07 DIAGNOSIS — G89.3 NEOPLASM RELATED PAIN: ICD-10-CM

## 2019-10-07 RX ORDER — HYDROCODONE BITARTRATE AND ACETAMINOPHEN 7.5; 325 MG/1; MG/1
1 TABLET ORAL EVERY 6 HOURS PRN
Qty: 120 TABLET | Refills: 0 | Status: SHIPPED | OUTPATIENT
Start: 2019-10-07 | End: 2019-11-06 | Stop reason: SDUPTHER

## 2019-10-08 ENCOUNTER — TELEPHONE (OUTPATIENT)
Dept: SMOKING CESSATION | Facility: CLINIC | Age: 49
End: 2019-10-08

## 2019-10-08 NOTE — TELEPHONE ENCOUNTER
1 attempt. Missed intake.  Left message to  to call Amy Maya at [phone] or [phone] to reschedule or answer questions.

## 2019-10-09 DIAGNOSIS — F17.210 HEAVY SMOKER (MORE THAN 20 CIGARETTES PER DAY): ICD-10-CM

## 2019-10-10 ENCOUNTER — PATIENT MESSAGE (OUTPATIENT)
Dept: SMOKING CESSATION | Facility: CLINIC | Age: 49
End: 2019-10-10

## 2019-10-10 ENCOUNTER — TELEPHONE (OUTPATIENT)
Dept: SMOKING CESSATION | Facility: CLINIC | Age: 49
End: 2019-10-10

## 2019-10-10 NOTE — TELEPHONE ENCOUNTER
Received request Josey needed an order for Chantix. Tried to talk to patient x 2 and do not have information on how she has progressed. Will try to keep contacting patient.

## 2019-10-14 ENCOUNTER — TELEPHONE (OUTPATIENT)
Dept: SMOKING CESSATION | Facility: CLINIC | Age: 49
End: 2019-10-14

## 2019-10-14 RX ORDER — VARENICLINE TARTRATE 1 MG/1
TABLET, FILM COATED ORAL
Qty: 53 TABLET | Refills: 0 | OUTPATIENT
Start: 2019-10-14

## 2019-10-14 NOTE — TELEPHONE ENCOUNTER
3 attempt. Missed intake.  Left message to  to call Amy Maya at [phone] or [phone] to reschedule or answer questions.

## 2019-10-23 ENCOUNTER — LAB VISIT (OUTPATIENT)
Dept: LAB | Facility: HOSPITAL | Age: 49
End: 2019-10-23
Attending: INTERNAL MEDICINE
Payer: MEDICAID

## 2019-10-23 ENCOUNTER — OFFICE VISIT (OUTPATIENT)
Dept: HEMATOLOGY/ONCOLOGY | Facility: CLINIC | Age: 49
End: 2019-10-23
Payer: MEDICAID

## 2019-10-23 ENCOUNTER — PATIENT MESSAGE (OUTPATIENT)
Dept: HEMATOLOGY/ONCOLOGY | Facility: CLINIC | Age: 49
End: 2019-10-23

## 2019-10-23 VITALS
BODY MASS INDEX: 36.13 KG/M2 | HEIGHT: 67 IN | DIASTOLIC BLOOD PRESSURE: 79 MMHG | SYSTOLIC BLOOD PRESSURE: 124 MMHG | WEIGHT: 230.19 LBS | OXYGEN SATURATION: 97 % | TEMPERATURE: 98 F | HEART RATE: 81 BPM

## 2019-10-23 DIAGNOSIS — G89.3 NEOPLASM RELATED PAIN: ICD-10-CM

## 2019-10-23 DIAGNOSIS — C79.51 SECONDARY CANCER OF BONE: ICD-10-CM

## 2019-10-23 DIAGNOSIS — F17.200 SMOKING: ICD-10-CM

## 2019-10-23 DIAGNOSIS — Z17.0 MALIGNANT NEOPLASM OF OVERLAPPING SITES OF RIGHT BREAST IN FEMALE, ESTROGEN RECEPTOR POSITIVE: Primary | ICD-10-CM

## 2019-10-23 DIAGNOSIS — C73 PAPILLARY THYROID CARCINOMA: ICD-10-CM

## 2019-10-23 DIAGNOSIS — Z17.0 MALIGNANT NEOPLASM OF OVERLAPPING SITES OF RIGHT BREAST IN FEMALE, ESTROGEN RECEPTOR POSITIVE: ICD-10-CM

## 2019-10-23 DIAGNOSIS — R87.613 HSIL (HIGH GRADE SQUAMOUS INTRAEPITHELIAL LESION) ON PAP SMEAR OF CERVIX: ICD-10-CM

## 2019-10-23 DIAGNOSIS — C50.811 MALIGNANT NEOPLASM OF OVERLAPPING SITES OF RIGHT BREAST IN FEMALE, ESTROGEN RECEPTOR POSITIVE: Primary | ICD-10-CM

## 2019-10-23 DIAGNOSIS — C50.811 MALIGNANT NEOPLASM OF OVERLAPPING SITES OF RIGHT BREAST IN FEMALE, ESTROGEN RECEPTOR POSITIVE: ICD-10-CM

## 2019-10-23 DIAGNOSIS — C77.3 MALIGNANT NEOPLASM METASTATIC TO LYMPH NODE OF AXILLA: ICD-10-CM

## 2019-10-23 LAB
ALBUMIN SERPL BCP-MCNC: 3.7 G/DL (ref 3.5–5.2)
ALP SERPL-CCNC: 54 U/L (ref 55–135)
ALT SERPL W/O P-5'-P-CCNC: 27 U/L (ref 10–44)
ANION GAP SERPL CALC-SCNC: 10 MMOL/L (ref 8–16)
AST SERPL-CCNC: 19 U/L (ref 10–40)
BASOPHILS # BLD AUTO: 0.06 K/UL (ref 0–0.2)
BASOPHILS NFR BLD: 1.4 % (ref 0–1.9)
BILIRUB SERPL-MCNC: 0.4 MG/DL (ref 0.1–1)
BUN SERPL-MCNC: 8 MG/DL (ref 6–20)
CALCIUM SERPL-MCNC: 9.1 MG/DL (ref 8.7–10.5)
CHLORIDE SERPL-SCNC: 106 MMOL/L (ref 95–110)
CO2 SERPL-SCNC: 25 MMOL/L (ref 23–29)
CREAT SERPL-MCNC: 1.2 MG/DL (ref 0.5–1.4)
DIFFERENTIAL METHOD: ABNORMAL
EOSINOPHIL # BLD AUTO: 0.1 K/UL (ref 0–0.5)
EOSINOPHIL NFR BLD: 1.2 % (ref 0–8)
ERYTHROCYTE [DISTWIDTH] IN BLOOD BY AUTOMATED COUNT: 13.7 % (ref 11.5–14.5)
EST. GFR  (AFRICAN AMERICAN): >60 ML/MIN/1.73 M^2
EST. GFR  (NON AFRICAN AMERICAN): 53 ML/MIN/1.73 M^2
GLUCOSE SERPL-MCNC: 95 MG/DL (ref 70–110)
HCT VFR BLD AUTO: 35.7 % (ref 37–48.5)
HGB BLD-MCNC: 12.3 G/DL (ref 12–16)
IMM GRANULOCYTES # BLD AUTO: 0.06 K/UL (ref 0–0.04)
IMM GRANULOCYTES NFR BLD AUTO: 1.4 % (ref 0–0.5)
LYMPHOCYTES # BLD AUTO: 1.7 K/UL (ref 1–4.8)
LYMPHOCYTES NFR BLD: 39.5 % (ref 18–48)
MCH RBC QN AUTO: 37.6 PG (ref 27–31)
MCHC RBC AUTO-ENTMCNC: 34.5 G/DL (ref 32–36)
MCV RBC AUTO: 109 FL (ref 82–98)
MONOCYTES # BLD AUTO: 0.5 K/UL (ref 0.3–1)
MONOCYTES NFR BLD: 11.4 % (ref 4–15)
NEUTROPHILS # BLD AUTO: 1.9 K/UL (ref 1.8–7.7)
NEUTROPHILS NFR BLD: 45.1 % (ref 38–73)
NRBC BLD-RTO: 0 /100 WBC
PLATELET # BLD AUTO: 187 K/UL (ref 150–350)
PMV BLD AUTO: 10.4 FL (ref 9.2–12.9)
POTASSIUM SERPL-SCNC: 4.1 MMOL/L (ref 3.5–5.1)
PROT SERPL-MCNC: 6.9 G/DL (ref 6–8.4)
RBC # BLD AUTO: 3.27 M/UL (ref 4–5.4)
SODIUM SERPL-SCNC: 141 MMOL/L (ref 136–145)
WBC # BLD AUTO: 4.28 K/UL (ref 3.9–12.7)

## 2019-10-23 PROCEDURE — 99213 OFFICE O/P EST LOW 20 MIN: CPT | Mod: PBBFAC | Performed by: INTERNAL MEDICINE

## 2019-10-23 PROCEDURE — 99215 OFFICE O/P EST HI 40 MIN: CPT | Mod: S$PBB,,, | Performed by: INTERNAL MEDICINE

## 2019-10-23 PROCEDURE — 36415 COLL VENOUS BLD VENIPUNCTURE: CPT

## 2019-10-23 PROCEDURE — 99215 PR OFFICE/OUTPT VISIT, EST, LEVL V, 40-54 MIN: ICD-10-PCS | Mod: S$PBB,,, | Performed by: INTERNAL MEDICINE

## 2019-10-23 PROCEDURE — 99999 PR PBB SHADOW E&M-EST. PATIENT-LVL III: ICD-10-PCS | Mod: PBBFAC,,, | Performed by: INTERNAL MEDICINE

## 2019-10-23 PROCEDURE — 80053 COMPREHEN METABOLIC PANEL: CPT

## 2019-10-23 PROCEDURE — 99999 PR PBB SHADOW E&M-EST. PATIENT-LVL III: CPT | Mod: PBBFAC,,, | Performed by: INTERNAL MEDICINE

## 2019-10-23 NOTE — PROGRESS NOTES
Subjective:      DATE OF VISIT: 10/23/2019   ?   ?   Patient ID:?Josey Flores is a 48 y.o. female.?? MR#: [id number]   ?   PRIMARY PROVIDER: Dr. Delong, transferring to Dr. Asencio  ?   CHIEF COMPLAINT:  Follow-up, on therapy for metastatic breast?????   ?   ONCOLOGIC DIAGNOSIS:      stage IV, T2 N1b M1, invasive breast carcinoma, ER/VT+, HER2-    Papillary thyroid carcinoma status post total thyroidectomy on 08/31/2017, mpT1b N0    Cervical HSIL MIREILLE 3 s/p LEEP, 2017  ?   CURRENT TREATMENT: palbociclib and letrozole    HPI    I had the pleasure meeting for the 1st time Ms. Flores who is accompanied by her  today.  As she is transferring her care I have carefully reviewed her medical history for online medical record as well as with her and has been today.  She tells me of initial diagnosis after self palpation of mass near right nipple.  Further imaging revealed two lesions in right breast, axillary lymph node.  PET-CT in January 2017 revealed metastatic disease involving 2 lesions in right breast, right axilla, and metastatic disease in manubrium as well as left posterior ilium.  Thyroid was also noted to have avidity.  This was found to be papillary thyroid cancer and she underwent total thyroidectomy.  She notes development of bilateral upper extremity edema and underwent palliative bilateral axillary resection as well as resection of metastatic focus in left ilial region per patient report.     She also has a history of HSIL MIREILLE 3 status post LEEP in 2017.  She has not had follow-up recently with her gynecologist.  She denies vaginal bleeding.    During her visit with me today she notes chronic unchanged pain in lower pelvis above pelvic bone without associated edema. She also has pain in bilateral shoulders diffusely and occasionally sharper pain in the right upper quadrant which comes and goes without clear exacerbating or relieving factors.  She does use Norco every 4-6 hours, last refill on  10/07/2019.  She continues to smoke daily.    Review of Systems    ?   A comprehensive 14-point review of systems was reviewed with patient and was negative other than as specified above.   ?   Current Outpatient Medications   Medication Sig Dispense Refill    albuterol (PROVENTIL) 2.5 mg /3 mL (0.083 %) nebulizer solution Take 3 mLs (2.5 mg total) by nebulization every 6 (six) hours while awake. 270 mL 11    ALPRAZolam (XANAX) 0.5 MG tablet Take 1 tablet (0.5 mg total) by mouth 2 (two) times daily as needed for Insomnia or Anxiety. 60 tablet 0    benzonatate (TESSALON) 200 MG capsule Take 1 capsule (200 mg total) by mouth 3 (three) times daily as needed for Cough. 90 capsule 11    budesonide (PULMICORT) 0.5 mg/2 mL nebulizer solution Take 2 mLs (0.5 mg total) by nebulization 2 (two) times daily. Wash out mouth after using. 120 mL 11    calcium carbonate 400 mg calcium (1,000 mg) Chew Take 2,000 mg by mouth once daily.      cyanocobalamin (VITAMIN B-12) 1000 MCG tablet Take 1 tablet (1,000 mcg total) by mouth once daily. 90 tablet 3    ergocalciferol (VITAMIN D2) 50,000 unit Cap Take 5,000 Units by mouth once daily at 6am.       fluticasone propionate (FLONASE) 50 mcg/actuation nasal spray 2 sprays (100 mcg total) by Each Nare route once daily. 1 Bottle 11    folic acid (FOLVITE) 1 MG tablet Take 1 tablet (1 mg total) by mouth once daily. 90 tablet 1    HYDROcodone-acetaminophen (NORCO) 7.5-325 mg per tablet Take 1 tablet by mouth every 6 (six) hours as needed for Pain. 120 tablet 0    IBRANCE 125 mg Cap TAKE 1 CAPSULE BY MOUTH DAILY FOR 21 DAYS FOLLOWED BY 7 DAYS OFF 21 capsule 5    ipratropium (ATROVENT) 42 mcg (0.06 %) nasal spray 2 sprays by Nasal route 4 (four) times daily. As needed for rhinitis. Take in evening before bed and in the morning 15 mL 11    letrozole (FEMARA) 2.5 mg Tab TAKE 1 TABLET BY MOUTH ONCE DAILY 90 tablet 1    levothyroxine (SYNTHROID) 150 MCG tablet Take 1 tablet (150 mcg  total) by mouth once daily. 30 tablet 5    multivitamin (THERAGRAN) per tablet Take 1 tablet by mouth once daily.      nicotine (NICODERM CQ) 21 mg/24 hr Place 1 patch onto the skin once daily. 28 patch 0    varenicline (CHANTIX) 1 mg Tab as directed 53 tablet 0     No current facility-administered medications for this visit.          Objective:      Physical Exam      ?   Vitals:    10/23/19 1311   BP: 124/79   Pulse: 81   Temp: 97.7 °F (36.5 °C)      ?   ECOG:?0   General appearance: Generally well appearing, in no acute distress.   Head, eyes, ears, nose, and throat: Oropharynx clear with moist mucous membranes.   Cardiovascular: Regular rate and rhythm, S1, S2, no audible murmurs.   Respiratory: Lungs clear to auscultation bilaterally.   Abdomen: nontender, nondistended.   Extremities: Warm, without edema.   Neurologic: Alert and oriented. Grossly normal strength, coordination, and gait.   Skin: No rashes, ecchymoses or petechial lesion.  Bilateral axilla with surgical scars.  Psychiatric: normal mood and affect, conversant and appropriate    ?   Laboratory:  ?   Lab Visit on 10/23/2019   Component Date Value Ref Range Status    WBC 10/23/2019 4.28  3.90 - 12.70 K/uL Final    RBC 10/23/2019 3.27* 4.00 - 5.40 M/uL Final    Hemoglobin 10/23/2019 12.3  12.0 - 16.0 g/dL Final    Hematocrit 10/23/2019 35.7* 37.0 - 48.5 % Final    Mean Corpuscular Volume 10/23/2019 109* 82 - 98 fL Final    Mean Corpuscular Hemoglobin 10/23/2019 37.6* 27.0 - 31.0 pg Final    Mean Corpuscular Hemoglobin Conc 10/23/2019 34.5  32.0 - 36.0 g/dL Final    RDW 10/23/2019 13.7  11.5 - 14.5 % Final    Platelets 10/23/2019 187  150 - 350 K/uL Final    MPV 10/23/2019 10.4  9.2 - 12.9 fL Final    Immature Granulocytes 10/23/2019 1.4* 0.0 - 0.5 % Final    Gran # (ANC) 10/23/2019 1.9  1.8 - 7.7 K/uL Final    Immature Grans (Abs) 10/23/2019 0.06* 0.00 - 0.04 K/uL Final    Lymph # 10/23/2019 1.7  1.0 - 4.8 K/uL Final    Mono #  10/23/2019 0.5  0.3 - 1.0 K/uL Final    Eos # 10/23/2019 0.1  0.0 - 0.5 K/uL Final    Baso # 10/23/2019 0.06  0.00 - 0.20 K/uL Final    nRBC 10/23/2019 0  0 /100 WBC Final    Gran% 10/23/2019 45.1  38.0 - 73.0 % Final    Lymph% 10/23/2019 39.5  18.0 - 48.0 % Final    Mono% 10/23/2019 11.4  4.0 - 15.0 % Final    Eosinophil% 10/23/2019 1.2  0.0 - 8.0 % Final    Basophil% 10/23/2019 1.4  0.0 - 1.9 % Final    Differential Method 10/23/2019 Automated   Final    Sodium 10/23/2019 141  136 - 145 mmol/L Final    Potassium 10/23/2019 4.1  3.5 - 5.1 mmol/L Final    Chloride 10/23/2019 106  95 - 110 mmol/L Final    CO2 10/23/2019 25  23 - 29 mmol/L Final    Glucose 10/23/2019 95  70 - 110 mg/dL Final    BUN, Bld 10/23/2019 8  6 - 20 mg/dL Final    Creatinine 10/23/2019 1.2  0.5 - 1.4 mg/dL Final    Calcium 10/23/2019 9.1  8.7 - 10.5 mg/dL Final    Total Protein 10/23/2019 6.9  6.0 - 8.4 g/dL Final    Albumin 10/23/2019 3.7  3.5 - 5.2 g/dL Final    Total Bilirubin 10/23/2019 0.4  0.1 - 1.0 mg/dL Final    Alkaline Phosphatase 10/23/2019 54* 55 - 135 U/L Final    AST 10/23/2019 19  10 - 40 U/L Final    ALT 10/23/2019 27  10 - 44 U/L Final    Anion Gap 10/23/2019 10  8 - 16 mmol/L Final    eGFR if African American 10/23/2019 >60  >60 mL/min/1.73 m^2 Final    eGFR if non African American 10/23/2019 53* >60 mL/min/1.73 m^2 Final      ? IMAGING 9/11/19  CT CHEST WITH CONTRAST; CT ABDOMEN PELVIS WITH CONTRAST    CLINICAL HISTORY:  follow up of breast cancer;; f/u metastatic breast cancer;Cough    TECHNIQUE:  Low dose axial images, sagittal and coronal reformations were obtained from the thoracic inlet to the pubic synthesis following the oral administration of 30 mL of Omnipaque 350, and IV 75 mL Omnipaque 350.    COMPARISON:  08/24/2019 CT abdomen pelvis    FINDINGS:  Thoracic soft tissues: No significant abnormality.    Aorta: Normal in course and caliber, without significant atherosclerotic plaque. There  are three branching vessels at the arch.    Heart: Normal in size. No pericardial effusion.    Breanna/Mediastinum: Calcified right hilar lymph nodes.  No lymphadenopathy.    Lungs: Moderate severity upper lobe predominant centrilobular emphysema.  Calcified right upper lobe granuloma.  Unchanged 5 mm nodule anterior aspect right upper lobe series 2, image 32; this is unchanged compared to 01/17/2017.  No new nodules.    Liver: Fatty infiltration of the liver.  No appreciable focal liver lesions.    Gallbladder: Surgically absent.    Bile Ducts: No evidence of dilated ducts.    Pancreas: No mass or peripancreatic fat stranding.    Spleen: Calcified granulomas.    Adrenals: Unremarkable.    Kidneys/ Ureters: Normal in size and location. Normal concentration and excretion of contrast. No hydronephrosis or nephrolithiasis. No ureteral dilatation.    Bladder: No evidence of wall thickening.    Reproductive organs: Unremarkable.    GI Tract/Mesentery: No evidence of bowel obstruction or inflammation. Normal transit of oral contrast.    Peritoneal Space: No ascites. No free air.    Retroperitoneum: No significant adenopathy.    Abdominal wall: Unremarkable.    Vasculature: No significant atherosclerosis or aneurysm.    Bones: Unchanged left and right iliac lytic lesions compared to multiple prior studies.  No new lesions.      Impression       Stable exam compared to 02/18/2019.       ?   Assessment/Plan:       1. Malignant neoplasm of overlapping sites of right breast in female, estrogen receptor positive    2. Secondary cancer of bone    3. Smoking    4. HSIL (high grade squamous intraepithelial lesion) on Pap smear of cervix    5. Papillary thyroid carcinoma          Plan:     # metastatic breast:  Continues on systemic therapy with palbociclib and letrozole, tolerating well.  Last staging imaging on 09/11/2019 I have reviewed and demonstrate stable disease over the last several months.  We discussed plan for restaging  exams q.6 months (would next be due March 2020, or sooner if clinical concern) with history and physical exam follow-up quarterly.    # bony metastatic disease:  Continues on prophylactic denosumab, calcium and vitamin-D supplementation    # tobacco use:  Counseled on importance of cessation    # HSIL:  Status post LEEP 2017, encouraged follow-up with her gynecologist for surveillance, no current acute issues/vaginal bleeding.    # history of papillary thyroid carcinoma status post total thyroidectomy on 08/31/2017: s/p total thyroidectomy on levothyroxine.  Metastatic breast cancer restaging imaging without findings for locally recurrent disease.  Follows with Dr. Jason in endocrinology last seen 5/2019 with 6 month follow-up planned and will likely get TSH/thyroglobulin screening at that time and therefore did not order today.    Follow-Up:   Three months

## 2019-10-30 ENCOUNTER — INFUSION (OUTPATIENT)
Dept: INFUSION THERAPY | Facility: HOSPITAL | Age: 49
End: 2019-10-30
Attending: INTERNAL MEDICINE
Payer: MEDICAID

## 2019-10-30 DIAGNOSIS — F41.9 ANXIETY: ICD-10-CM

## 2019-10-30 DIAGNOSIS — C50.811 MALIGNANT NEOPLASM OF OVERLAPPING SITES OF RIGHT BREAST IN FEMALE, ESTROGEN RECEPTOR POSITIVE: ICD-10-CM

## 2019-10-30 DIAGNOSIS — Z17.0 MALIGNANT NEOPLASM OF OVERLAPPING SITES OF RIGHT BREAST IN FEMALE, ESTROGEN RECEPTOR POSITIVE: ICD-10-CM

## 2019-10-30 DIAGNOSIS — C79.51 SECONDARY CANCER OF BONE: Primary | ICD-10-CM

## 2019-10-30 PROCEDURE — 96372 THER/PROPH/DIAG INJ SC/IM: CPT

## 2019-10-30 PROCEDURE — 63600175 PHARM REV CODE 636 W HCPCS: Mod: JG | Performed by: INTERNAL MEDICINE

## 2019-10-30 RX ORDER — ALPRAZOLAM 0.5 MG/1
0.5 TABLET ORAL 2 TIMES DAILY PRN
Qty: 60 TABLET | Refills: 0 | Status: SHIPPED | OUTPATIENT
Start: 2019-10-30 | End: 2019-12-02 | Stop reason: SDUPTHER

## 2019-10-30 RX ADMIN — DENOSUMAB 120 MG: 120 INJECTION SUBCUTANEOUS at 01:10

## 2019-10-30 NOTE — DISCHARGE INSTRUCTIONS
[facility]  [address] [address] [facility] [address] [address] [facility]  [address]  [phone] phone     [phone] fax  Hours of Operation: Monday- Friday 8:00am- 5:00pm  After hours phone  [phone]  Hematology / Oncology Physicians on call      VINNY Danielle Dr., Dr., Dr., Dr., NP Sydney Prescott, NP Tyesha Taylor, NP    Please call with any concerns regarding your appointment today.Remember to take your calcium with vitamin D supplement as directed.   Do not have any dental work for 3 months following your injection today.

## 2019-10-30 NOTE — NURSING
Xgeva Injection given without difficulties. Bandaid applied. Patient instructed to stay in the clinic for 15 minutes. Patient verbalized understanding and will notify nurse with any complaints.  Reminded pt to take calcium with vitamin D supplement as directed and to not have any dental work for 3 months following injection today.  She states full understanding.

## 2019-11-04 ENCOUNTER — TELEPHONE (OUTPATIENT)
Dept: ENDOCRINOLOGY | Facility: CLINIC | Age: 49
End: 2019-11-04

## 2019-11-04 NOTE — TELEPHONE ENCOUNTER
Appt made for pt--      --- Message from Pau Espinal sent at 11/4/2019 11:30 AM CST -----  Contact: self  Type:  Sooner Apoointment Request    Caller is requesting a sooner appointment.  Caller declined first available appointment listed below.  Caller will not accept being placed on the waitlist and is requesting a message be sent to doctor.  Name of Caller:Josey Flores  When is the first available appointment?01/02  Symptoms:n/a  Would the patient rather a call back or a response via [facility]? Call back  Best Call Back Number:[phone]  Additional Information: pt had to r/s today's appt due to car broke down.    Thanks,  Pau Espinal

## 2019-11-05 ENCOUNTER — OFFICE VISIT (OUTPATIENT)
Dept: ENDOCRINOLOGY | Facility: CLINIC | Age: 49
End: 2019-11-05
Payer: MEDICAID

## 2019-11-05 ENCOUNTER — LAB VISIT (OUTPATIENT)
Dept: LAB | Facility: HOSPITAL | Age: 49
End: 2019-11-05
Payer: MEDICAID

## 2019-11-05 VITALS
HEART RATE: 82 BPM | BODY MASS INDEX: 36.08 KG/M2 | WEIGHT: 230.38 LBS | DIASTOLIC BLOOD PRESSURE: 87 MMHG | RESPIRATION RATE: 18 BRPM | TEMPERATURE: 99 F | SYSTOLIC BLOOD PRESSURE: 113 MMHG

## 2019-11-05 DIAGNOSIS — E89.0 POSTOPERATIVE HYPOTHYROIDISM: ICD-10-CM

## 2019-11-05 DIAGNOSIS — Z17.0 MALIGNANT NEOPLASM OF OVERLAPPING SITES OF RIGHT BREAST IN FEMALE, ESTROGEN RECEPTOR POSITIVE: ICD-10-CM

## 2019-11-05 DIAGNOSIS — Z85.850 HISTORY OF THYROID CANCER: ICD-10-CM

## 2019-11-05 DIAGNOSIS — C50.811 MALIGNANT NEOPLASM OF OVERLAPPING SITES OF RIGHT BREAST IN FEMALE, ESTROGEN RECEPTOR POSITIVE: ICD-10-CM

## 2019-11-05 DIAGNOSIS — Z85.850 HISTORY OF THYROID CANCER: Primary | ICD-10-CM

## 2019-11-05 LAB
T4 FREE SERPL-MCNC: 0.91 NG/DL (ref 0.71–1.51)
TSH SERPL DL<=0.005 MIU/L-ACNC: 10.78 UIU/ML (ref 0.4–4)

## 2019-11-05 PROCEDURE — 99214 OFFICE O/P EST MOD 30 MIN: CPT | Mod: S$PBB,,, | Performed by: INTERNAL MEDICINE

## 2019-11-05 PROCEDURE — 84439 ASSAY OF FREE THYROXINE: CPT

## 2019-11-05 PROCEDURE — 99214 PR OFFICE/OUTPT VISIT, EST, LEVL IV, 30-39 MIN: ICD-10-PCS | Mod: S$PBB,,, | Performed by: INTERNAL MEDICINE

## 2019-11-05 PROCEDURE — 99999 PR PBB SHADOW E&M-EST. PATIENT-LVL III: ICD-10-PCS | Mod: PBBFAC,,, | Performed by: INTERNAL MEDICINE

## 2019-11-05 PROCEDURE — 84443 ASSAY THYROID STIM HORMONE: CPT

## 2019-11-05 PROCEDURE — 99999 PR PBB SHADOW E&M-EST. PATIENT-LVL III: CPT | Mod: PBBFAC,,, | Performed by: INTERNAL MEDICINE

## 2019-11-05 PROCEDURE — 99213 OFFICE O/P EST LOW 20 MIN: CPT | Mod: PBBFAC | Performed by: INTERNAL MEDICINE

## 2019-11-05 PROCEDURE — 84432 ASSAY OF THYROGLOBULIN: CPT

## 2019-11-05 RX ORDER — LEVOTHYROXINE SODIUM 150 UG/1
150 TABLET ORAL DAILY
Qty: 30 TABLET | Refills: 5 | Status: SHIPPED | OUTPATIENT
Start: 2019-11-05 | End: 2020-08-03

## 2019-11-05 NOTE — PROGRESS NOTES
Patient ID: Josey Flores is a 49 y.o. female.  Patient is here for follow up        Chief Complaint: Follow-up; Hypothyroidism; and Thyroid Cancer      HPI     Patient with history of Metastatic multifocal infiltrating ductal carcinoma of the right breast with right axillary and bone involvement [ER positive/AK positive/HER-2 negative] for which she has received adjuvant therapy with good response with no FDG avid areas remaining regarding her breast cancer, who was found to have an FDG avid area on PET scan in the right thyroid, FNA was suspicious for papillary thyroid cancer, she subsequently underwent a total thyroidectomy performed by Dr. Zaid Baugh on August 31, 2017 and final pathology showed the following:  Thyroid, total thyroidectomy:  Papillary carcinoma, follicular variant, 2 cm, [id number].  Second focus of micropapillary carcinoma adjacent to main tumor.  Two lymph nodes negative for carcinoma.  See note and synoptic report below.  Note: Reviewed by Dr. Lois Ortiz who concurs with the diagnosis.  Synoptic report: Thyroid Gland  Procedure: Total thyroidectomy.  Tumor focality: Multifocal.  Tumor site: Right lobe.  Tumor size: 2 x 2 x 1.5 cm.  Histologic type: Papillary carcinoma, follicular variant, encapsulated/well demarcated, with tumor capsular  invasion.  Margins: Uninvolved by carcinoma.  Angioinvasion: Not identified.  Lymphatic invasion: Not identified.  Extrathyroidal extension: Not identified.  Regional lymph nodes:  -Number of lymph nodes involved: 0.  -Number of lymph nodes examined: 2 (incidental perithyroidal).  Pathologic Stage Classification: [id number].    She did not receive radioactive iodine treatment.  She and her  at the time did not want to pursue any further treatment be on thyroidectomy especially since she was dealing with her breast cancer diagnosis    She is currently on levothyroxine 150 mcg, increased from 137 µg at last visit and takes a multivitamin and  calcium 2000 mg daily-TUMS-    Postoperatively her calcium was low but subsequent calcium levels have been fine    Thyroglobulin levels have been negative ; once when her TSH was 55 her thyroglobulin was 8.1, at that time she was taking calcium along with her thyroid medication    Neck ultrasound 2019 look fine with no pathologic lesions  She is on Ibrance and xgeva    Patient is a smoker and has COPD and has chronic dyspnea on exertion    She complains of a cough mostly at nighttime and has had some tiredness and fatigue      Also history of anxiety and depression and see psychiatry  She has been suffering from depression and having a hard time adjusting to her diagnosis  Denies swelling other than her chronic intermittent ankle swelling that has not worsened    In the past had  MRI findings showing metastasis in her spine and pelvis she has undergone radiation    She complains of sharp intermittent pains in various places such as and her upper abdomen, or pelvic area or back area and occasionally her appetite is not the best    Weight is down since last visit, denies any palpitations or sweats  Her last PET scan can showed the following:  PET scan 2017 that showed the followin. Interval thyroidectomy with some uptake noted in the region of the thyroid bed which is thought to be postsurgical in nature.     2.  Stable appearance of non-FDG avid osseus metastatic lesions involving the left ilium and manubrium.      I have reviewed the past medical, family and social history    Review of Systems   Constitutional: Negative for appetite change, fatigue, fever and unexpected weight change.   HENT: Negative for sore throat and trouble swallowing.    Eyes: Negative for visual disturbance.   Respiratory: Negative for shortness of breath and wheezing.    Cardiovascular: Negative for chest pain, palpitations and leg swelling.   Gastrointestinal: Negative for diarrhea, nausea and vomiting.   Endocrine:  Negative for cold intolerance, heat intolerance, polydipsia, polyphagia and polyuria.   Genitourinary: Negative for difficulty urinating, dysuria and menstrual problem.   Musculoskeletal: Positive for arthralgias. Negative for joint swelling.   Skin: Negative for rash.   Neurological: Negative for dizziness, weakness, numbness and headaches.   Psychiatric/Behavioral: Negative for confusion, dysphoric mood and sleep disturbance.       Objective:      Physical Exam   Constitutional: She appears well-developed and well-nourished. No distress.   HENT:   Head: Normocephalic and atraumatic.   Eyes: Conjunctivae are normal.   Neck: No JVD present. No tracheal deviation present. No thyromegaly present.   Cardiovascular: Normal rate, regular rhythm, normal heart sounds and intact distal pulses. Exam reveals no gallop and no friction rub.   No murmur heard.  Pulmonary/Chest: Effort normal and breath sounds normal. No stridor. No respiratory distress. She has no wheezes. She has no rales. She exhibits no tenderness.   Musculoskeletal: She exhibits no edema or deformity.   Lymphadenopathy:     She has no cervical adenopathy.   Neurological: She is alert.   Skin: No rash noted. She is not diaphoretic. No erythema. No pallor.   Vitals reviewed.        Lab Review:   Lab Visit on 10/23/2019   Component Date Value    WBC 10/23/2019 4.28     RBC 10/23/2019 3.27*    Hemoglobin 10/23/2019 12.3     Hematocrit 10/23/2019 35.7*    Mean Corpuscular Volume 10/23/2019 109*    Mean Corpuscular Hemoglo* 10/23/2019 37.6*    Mean Corpuscular Hemoglo* 10/23/2019 34.5     RDW 10/23/2019 13.7     Platelets 10/23/2019 187     MPV 10/23/2019 10.4     Immature Granulocytes 10/23/2019 1.4*    Gran # (ANC) 10/23/2019 1.9     Immature Grans (Abs) 10/23/2019 0.06*    Lymph # 10/23/2019 1.7     Mono # 10/23/2019 0.5     Eos # 10/23/2019 0.1     Baso # 10/23/2019 0.06     nRBC 10/23/2019 0     Gran% 10/23/2019 45.1     Lymph% 10/23/2019  39.5     Mono% 10/23/2019 11.4     Eosinophil% 10/23/2019 1.2     Basophil% 10/23/2019 1.4     Differential Method 10/23/2019 Automated     Sodium 10/23/2019 141     Potassium 10/23/2019 4.1     Chloride 10/23/2019 106     CO2 10/23/2019 25     Glucose 10/23/2019 95     BUN, Bld 10/23/2019 8     Creatinine 10/23/2019 1.2     Calcium 10/23/2019 9.1     Total Protein 10/23/2019 6.9     Albumin 10/23/2019 3.7     Total Bilirubin 10/23/2019 0.4     Alkaline Phosphatase 10/23/2019 54*    AST 10/23/2019 19     ALT 10/23/2019 27     Anion Gap 10/23/2019 10     eGFR if African American 10/23/2019 >60     eGFR if non  Amer* 10/23/2019 53*   Lab Visit on 09/26/2019   Component Date Value    Sodium 09/26/2019 142     Potassium 09/26/2019 4.7     Chloride 09/26/2019 105     CO2 09/26/2019 27     Glucose 09/26/2019 102     BUN, Bld 09/26/2019 10     Creatinine 09/26/2019 1.4     Calcium 09/26/2019 9.6     Total Protein 09/26/2019 7.1     Albumin 09/26/2019 3.8     Total Bilirubin 09/26/2019 0.4     Alkaline Phosphatase 09/26/2019 59     AST 09/26/2019 25     ALT 09/26/2019 34     Anion Gap 09/26/2019 10     eGFR if  09/26/2019 51*    eGFR if non African Amer* 09/26/2019 44*    WBC 09/26/2019 4.19     RBC 09/26/2019 3.47*    Hemoglobin 09/26/2019 13.0     Hematocrit 09/26/2019 37.6     Mean Corpuscular Volume 09/26/2019 108*    Mean Corpuscular Hemoglo* 09/26/2019 37.5*    Mean Corpuscular Hemoglo* 09/26/2019 34.6     RDW 09/26/2019 13.7     Platelets 09/26/2019 283     MPV 09/26/2019 10.5     Gran # (ANC) 09/26/2019 2.4     Lymph # 09/26/2019 1.5     Mono # 09/26/2019 0.2*    Eos # 09/26/2019 0.1     Baso # 09/26/2019 0.04     Gran% 09/26/2019 56.8     Lymph% 09/26/2019 36.5     Mono% 09/26/2019 4.5     Eosinophil% 09/26/2019 1.4     Basophil% 09/26/2019 1.0     Differential Method 09/26/2019 Automated    Admission on 08/23/2019, Discharged on  08/24/2019   Component Date Value    WBC 08/23/2019 4.31     RBC 08/23/2019 3.52*    Hemoglobin 08/23/2019 13.1     Hematocrit 08/23/2019 37.8     Mean Corpuscular Volume 08/23/2019 107*    Mean Corpuscular Hemoglo* 08/23/2019 37.2*    Mean Corpuscular Hemoglo* 08/23/2019 34.7     RDW 08/23/2019 13.8     Platelets 08/23/2019 286     MPV 08/23/2019 10.5     Gran # (ANC) 08/23/2019 2.2     Lymph # 08/23/2019 1.7     Mono # 08/23/2019 0.3     Eos # 08/23/2019 0.1     Baso # 08/23/2019 0.03     Gran% 08/23/2019 50.6     Lymph% 08/23/2019 40.1     Mono% 08/23/2019 6.5     Eosinophil% 08/23/2019 2.3     Basophil% 08/23/2019 0.7     Differential Method 08/23/2019 Automated     Sodium 08/23/2019 136     Potassium 08/23/2019 3.8     Chloride 08/23/2019 104     CO2 08/23/2019 22*    Glucose 08/23/2019 98     BUN, Bld 08/23/2019 17     Creatinine 08/23/2019 1.4     Calcium 08/23/2019 9.4     Total Protein 08/23/2019 7.4     Albumin 08/23/2019 3.9     Total Bilirubin 08/23/2019 0.4     Alkaline Phosphatase 08/23/2019 49*    AST 08/23/2019 23     ALT 08/23/2019 25     Anion Gap 08/23/2019 10     eGFR if  08/23/2019 51*    eGFR if non  Amer* 08/23/2019 44*    Specimen UA 08/24/2019 Urine, Clean Catch     Color, UA 08/24/2019 Yellow     Appearance, UA 08/24/2019 Clear     pH, UA 08/24/2019 6.0     Specific Gravity, UA 08/24/2019 >=1.030*    Protein, UA 08/24/2019 Negative     Glucose, UA 08/24/2019 Negative     Ketones, UA 08/24/2019 Negative     Bilirubin (UA) 08/24/2019 Negative     Occult Blood UA 08/24/2019 3+*    Nitrite, UA 08/24/2019 Negative     Urobilinogen, UA 08/24/2019 Negative     Leukocytes, UA 08/24/2019 Trace*    Amylase 08/23/2019 69     Lipase 08/23/2019 41     RBC, UA 08/24/2019 10*    WBC,  08/24/2019 30*    Bacteria 08/24/2019 Many*    Squam Epithel,  08/24/2019 2     Microscopic Comment 08/24/2019 SEE COMMENT    Lab Visit on  07/29/2019   Component Date Value    WBC 07/29/2019 5.07     RBC 07/29/2019 3.18*    Hemoglobin 07/29/2019 12.1     Hematocrit 07/29/2019 34.1*    Mean Corpuscular Volume 07/29/2019 107*    Mean Corpuscular Hemoglo* 07/29/2019 38.1*    Mean Corpuscular Hemoglo* 07/29/2019 35.5     RDW 07/29/2019 13.3     Platelets 07/29/2019 181     MPV 07/29/2019 10.1     Immature Granulocytes 07/29/2019 1.2*    Gran # (ANC) 07/29/2019 2.5     Immature Grans (Abs) 07/29/2019 0.06*    Lymph # 07/29/2019 2.1     Mono # 07/29/2019 0.4     Eos # 07/29/2019 0.1     Baso # 07/29/2019 0.03     nRBC 07/29/2019 0     Gran% 07/29/2019 49.7     Lymph% 07/29/2019 41.2     Mono% 07/29/2019 7.1     Eosinophil% 07/29/2019 1.4     Basophil% 07/29/2019 0.6     Platelet Estimate 07/29/2019 Appears normal     Aniso 07/29/2019 Slight     Poly 07/29/2019 Occasional     Ovalocytes 07/29/2019 Occasional     Differential Method 07/29/2019 Automated     Sodium 07/29/2019 141     Potassium 07/29/2019 3.2*    Chloride 07/29/2019 103     CO2 07/29/2019 26     Glucose 07/29/2019 102     BUN, Bld 07/29/2019 14     Creatinine 07/29/2019 1.3     Calcium 07/29/2019 9.5     Total Protein 07/29/2019 6.5     Albumin 07/29/2019 3.6     Total Bilirubin 07/29/2019 0.4     Alkaline Phosphatase 07/29/2019 43*    AST 07/29/2019 14     ALT 07/29/2019 26     Anion Gap 07/29/2019 12     eGFR if African American 07/29/2019 55*    eGFR if non African Amer* 07/29/2019 48*    LD 07/29/2019 180    Clinical Support on 07/23/2019   Component Date Value    Interpretation 07/23/2019                      Value:Mild obstruction. FEV1  76.46 % predicted.  (FEV1/VC< LLN and FEV1 > or equal to 70% predicted and < or equal to 79% predicted).           Pre FVC 07/23/2019 3.10     Pre FEV1 07/23/2019 2.31*    Pre FEV1 FVC 07/23/2019 74.54     Pre FEF 25 75 07/23/2019 1.83     Pre PEF 07/23/2019 4.89*    Pre  07/23/2019 10.97     FVC  Ref 07/23/2019 3.80     FVC LLN 07/23/2019 2.97     FVC Pre Ref 07/23/2019 81.6     FEV1 Ref 07/23/2019 3.02     FEV1 LLN 07/23/2019 2.35     FEV1 Pre Ref 07/23/2019 76.6     FEV1 FVC Ref 07/23/2019 80     FEV1 FVC LLN 07/23/2019 69     FEV1 FVC Pre Ref 07/23/2019 93.1     FEF 25 75 Ref 07/23/2019 2.87     FEF 25 75 LLN 07/23/2019 1.61     FEF 25 75 Pre Ref 07/23/2019 63.6     PEF Ref 07/23/2019 7.20     PEF LLN 07/23/2019 5.32     PEF Pre Ref 07/23/2019 67.9    Lab Visit on 06/13/2019   Component Date Value    TSH 06/13/2019 0.238*    Free T4 06/13/2019 1.31     T3, Free 06/13/2019 2.8     Thyroglobulin, Tumor Mar* 06/13/2019 0.2*    Thyroglobulin Antibody S* 06/13/2019 <1.8     Thyroglobulin Interpreta* 06/13/2019 SEE BELOW    Lab Visit on 05/24/2019   Component Date Value    WBC 05/24/2019 3.90     RBC 05/24/2019 3.30*    Hemoglobin 05/24/2019 12.1     Hematocrit 05/24/2019 36.3*    Mean Corpuscular Volume 05/24/2019 110*    Mean Corpuscular Hemoglo* 05/24/2019 36.7*    Mean Corpuscular Hemoglo* 05/24/2019 33.3     RDW 05/24/2019 13.2     Platelets 05/24/2019 185     MPV 05/24/2019 10.8     Immature Granulocytes 05/24/2019 0.8*    Gran # (ANC) 05/24/2019 2.0     Immature Grans (Abs) 05/24/2019 0.03     Lymph # 05/24/2019 1.4     Mono # 05/24/2019 0.5     Eos # 05/24/2019 0.1     Baso # 05/24/2019 0.02     nRBC 05/24/2019 0     Gran% 05/24/2019 50.8     Lymph% 05/24/2019 35.6     Mono% 05/24/2019 11.8     Eosinophil% 05/24/2019 1.3     Basophil% 05/24/2019 0.5     Differential Method 05/24/2019 Automated     Sodium 05/24/2019 140     Potassium 05/24/2019 3.7     Chloride 05/24/2019 105     CO2 05/24/2019 25     Glucose 05/24/2019 108     BUN, Bld 05/24/2019 12     Creatinine 05/24/2019 1.2     Calcium 05/24/2019 9.5     Total Protein 05/24/2019 7.3     Albumin 05/24/2019 3.8     Total Bilirubin 05/24/2019 0.4     Alkaline Phosphatase 05/24/2019 57     AST  05/24/2019 20     ALT 05/24/2019 28     Anion Gap 05/24/2019 10     eGFR if African American 05/24/2019 >60     eGFR if non  Amer* 05/24/2019 53*       Assessment:     1. History of thyroid cancer  TSH    T4, free    Thyroglobulin   2. Postoperative hypothyroidism  TSH    T4, free    Thyroglobulin   3. Malignant neoplasm of overlapping sites of right breast in female, estrogen receptor positive      continue Synthroid, check labs today  Plan:   History of thyroid cancer  -     TSH; Future; Expected date: 11/05/2019  -     T4, free; Future; Expected date: 11/05/2019  -     Thyroglobulin; Future; Expected date: 11/05/2019    Postoperative hypothyroidism  -     TSH; Future; Expected date: 11/05/2019  -     T4, free; Future; Expected date: 11/05/2019  -     Thyroglobulin; Future; Expected date: 11/05/2019    Malignant neoplasm of overlapping sites of right breast in female, estrogen receptor positive          Follow up in about 6 months (around 5/5/2020) for hypothyroidism.    Labs prior to appointment? not applicable     Disclaimer:  This note may have been partially prepared using voice recognition software and  it may have not been extensively proofed, as such there could be errors within the text such as sound alike errors.

## 2019-11-06 DIAGNOSIS — C50.811 MALIGNANT NEOPLASM OF OVERLAPPING SITES OF RIGHT BREAST IN FEMALE, ESTROGEN RECEPTOR POSITIVE: ICD-10-CM

## 2019-11-06 DIAGNOSIS — Z17.0 MALIGNANT NEOPLASM OF OVERLAPPING SITES OF RIGHT BREAST IN FEMALE, ESTROGEN RECEPTOR POSITIVE: ICD-10-CM

## 2019-11-06 DIAGNOSIS — G89.3 NEOPLASM RELATED PAIN: ICD-10-CM

## 2019-11-06 DIAGNOSIS — C77.3 MALIGNANT NEOPLASM METASTATIC TO LYMPH NODE OF AXILLA: ICD-10-CM

## 2019-11-06 DIAGNOSIS — C79.51 SECONDARY CANCER OF BONE: ICD-10-CM

## 2019-11-06 RX ORDER — HYDROCODONE BITARTRATE AND ACETAMINOPHEN 7.5; 325 MG/1; MG/1
1 TABLET ORAL EVERY 6 HOURS PRN
Qty: 120 TABLET | Refills: 0 | Status: SHIPPED | OUTPATIENT
Start: 2019-11-06 | End: 2019-12-06 | Stop reason: SDUPTHER

## 2019-11-07 LAB
THRYOGLOBULIN INTERPRETATION: ABNORMAL
THYROGLOB AB SERPL-ACNC: <1.8 IU/ML
THYROGLOB SERPL-MCNC: 6.3 NG/ML

## 2019-11-25 ENCOUNTER — PATIENT MESSAGE (OUTPATIENT)
Dept: ENDOCRINOLOGY | Facility: CLINIC | Age: 49
End: 2019-11-25

## 2019-12-02 ENCOUNTER — TELEPHONE (OUTPATIENT)
Dept: HEMATOLOGY/ONCOLOGY | Facility: CLINIC | Age: 49
End: 2019-12-02

## 2019-12-02 DIAGNOSIS — F41.9 ANXIETY: ICD-10-CM

## 2019-12-02 DIAGNOSIS — C77.3 MALIGNANT NEOPLASM METASTATIC TO LYMPH NODE OF AXILLA: ICD-10-CM

## 2019-12-02 DIAGNOSIS — G89.3 NEOPLASM RELATED PAIN: ICD-10-CM

## 2019-12-02 DIAGNOSIS — F32.A DEPRESSION, UNSPECIFIED DEPRESSION TYPE: ICD-10-CM

## 2019-12-02 DIAGNOSIS — C79.51 SECONDARY CANCER OF BONE: ICD-10-CM

## 2019-12-02 DIAGNOSIS — Z17.0 MALIGNANT NEOPLASM OF OVERLAPPING SITES OF RIGHT BREAST IN FEMALE, ESTROGEN RECEPTOR POSITIVE: ICD-10-CM

## 2019-12-02 DIAGNOSIS — C50.811 MALIGNANT NEOPLASM OF OVERLAPPING SITES OF RIGHT BREAST IN FEMALE, ESTROGEN RECEPTOR POSITIVE: ICD-10-CM

## 2019-12-02 RX ORDER — ALPRAZOLAM 0.5 MG/1
0.5 TABLET ORAL 2 TIMES DAILY PRN
Qty: 60 TABLET | Refills: 0 | Status: SHIPPED | OUTPATIENT
Start: 2019-12-02 | End: 2020-01-02 | Stop reason: SDUPTHER

## 2019-12-02 RX ORDER — LETROZOLE 2.5 MG/1
2.5 TABLET, FILM COATED ORAL DAILY
Qty: 90 TABLET | Refills: 1 | Status: SHIPPED | OUTPATIENT
Start: 2019-12-02 | End: 2020-01-28 | Stop reason: SDUPTHER

## 2019-12-02 NOTE — TELEPHONE ENCOUNTER
----- Message from Radha Wang sent at 12/2/2019  3:05 PM CST -----  Contact: Patient   Patient is calling to check on the status of her request for Xanax. Please call her at [phone].      [id number] [place] [address]  Phone: [phone] Fax: [phone]    Thanks  Td

## 2019-12-02 NOTE — TELEPHONE ENCOUNTER
----- Message from Bebe Arora sent at 12/2/2019 11:35 AM CST -----  Contact: [phone]  Would like to consult with the nurse, Patient would like to get a refill on her Medication, Please call back at [phone], Thanks sj  .Type:  RX Refill Request    Who Called:  Ms Flores  Refill or New Rx:refill  RX Name and Strength:Alprazolam 5 mg Also Letrozole 2.5 Mg  How is the patient currently taking it? (ex. 1XDay):once a day  Is this a 30 day or 90 day RX:30  Preferred Pharmacy with phone number.  [facility] [address] [place] [address]  Phone: [phone] Fax: [phone]        Local or Mail Order:local  Ordering Provider:Dr Asencio  Would the patient rather a call back or a response via [facility]? CallBack  Best Call Back Number:[phone]  Additional Information: Patient would like to get a refill as soon as possible, Patient is out of her Medication and would like to get a refiil,

## 2019-12-06 ENCOUNTER — TELEPHONE (OUTPATIENT)
Dept: HEMATOLOGY/ONCOLOGY | Facility: CLINIC | Age: 49
End: 2019-12-06

## 2019-12-06 DIAGNOSIS — C77.3 MALIGNANT NEOPLASM METASTATIC TO LYMPH NODE OF AXILLA: ICD-10-CM

## 2019-12-06 DIAGNOSIS — G89.3 NEOPLASM RELATED PAIN: ICD-10-CM

## 2019-12-06 DIAGNOSIS — C79.51 SECONDARY CANCER OF BONE: ICD-10-CM

## 2019-12-06 DIAGNOSIS — C50.811 MALIGNANT NEOPLASM OF OVERLAPPING SITES OF RIGHT BREAST IN FEMALE, ESTROGEN RECEPTOR POSITIVE: ICD-10-CM

## 2019-12-06 DIAGNOSIS — Z17.0 MALIGNANT NEOPLASM OF OVERLAPPING SITES OF RIGHT BREAST IN FEMALE, ESTROGEN RECEPTOR POSITIVE: ICD-10-CM

## 2019-12-06 RX ORDER — HYDROCODONE BITARTRATE AND ACETAMINOPHEN 7.5; 325 MG/1; MG/1
1 TABLET ORAL EVERY 6 HOURS PRN
Qty: 120 TABLET | Refills: 0 | Status: SHIPPED | OUTPATIENT
Start: 2019-12-06 | End: 2020-01-06 | Stop reason: SDUPTHER

## 2019-12-06 NOTE — TELEPHONE ENCOUNTER
----- Message from Della Stewart sent at 12/6/2019 10:50 AM CST -----  Contact: hugo   Type:  RX Refill Request    Who Called: Josey Flores   Refill or New Rx:refill  RX Name and Strength:hydrocodone  How is the patient currently taking it? (ex. 1XDay):as needed  Is this a 30 day or 90 day RX:30 day  Preferred Pharmacy with phone number:  [address] [address]  Phone: [phone] Fax: [phone]    Local or Mail Order:local  Ordering Provider:Dr. Arce  Would the patient rather a call back or a response via [facility]? call  Best Call Back Number:[phone]  Additional Information: n/a

## 2020-01-02 DIAGNOSIS — F41.9 ANXIETY: ICD-10-CM

## 2020-01-02 RX ORDER — ALPRAZOLAM 0.5 MG/1
0.5 TABLET ORAL 2 TIMES DAILY PRN
Qty: 60 TABLET | Refills: 0 | Status: SHIPPED | OUTPATIENT
Start: 2020-01-02 | End: 2020-02-03 | Stop reason: SDUPTHER

## 2020-01-02 NOTE — TELEPHONE ENCOUNTER
----- Message from Pau Espinal sent at 1/2/2020  2:10 PM CST -----  Contact: self  Type:  RX Refill Request    Who Called: pt  Refill or New Rx:refill  RX Name and Strength:zanex  How is the patient currently taking it? (ex. 1XDay):n/a  Is this a 30 day or 90 day RX:n/a  Preferred Pharmacy with phone number:  [facility] [address] [place] [address]  Phone: [phone] Fax: [phone]  Local or Mail Order:local  Ordering Provider:jorden  Would the patient rather a call back or a response via [facility]? Call back  Best Call Back Number:[phone]  Additional Information: none    Thanks,  Pau Espinal

## 2020-01-06 ENCOUNTER — TELEPHONE (OUTPATIENT)
Dept: HEMATOLOGY/ONCOLOGY | Facility: CLINIC | Age: 50
End: 2020-01-06

## 2020-01-06 DIAGNOSIS — C79.51 SECONDARY CANCER OF BONE: ICD-10-CM

## 2020-01-06 DIAGNOSIS — C77.3 MALIGNANT NEOPLASM METASTATIC TO LYMPH NODE OF AXILLA: ICD-10-CM

## 2020-01-06 DIAGNOSIS — C50.811 MALIGNANT NEOPLASM OF OVERLAPPING SITES OF RIGHT BREAST IN FEMALE, ESTROGEN RECEPTOR POSITIVE: ICD-10-CM

## 2020-01-06 DIAGNOSIS — Z17.0 MALIGNANT NEOPLASM OF OVERLAPPING SITES OF RIGHT BREAST IN FEMALE, ESTROGEN RECEPTOR POSITIVE: ICD-10-CM

## 2020-01-06 DIAGNOSIS — G89.3 NEOPLASM RELATED PAIN: ICD-10-CM

## 2020-01-06 RX ORDER — HYDROCODONE BITARTRATE AND ACETAMINOPHEN 7.5; 325 MG/1; MG/1
1 TABLET ORAL EVERY 6 HOURS PRN
Qty: 120 TABLET | Refills: 0 | Status: SHIPPED | OUTPATIENT
Start: 2020-01-06 | End: 2020-02-05 | Stop reason: SDUPTHER

## 2020-01-28 DIAGNOSIS — F32.A DEPRESSION, UNSPECIFIED DEPRESSION TYPE: ICD-10-CM

## 2020-01-28 DIAGNOSIS — G89.3 NEOPLASM RELATED PAIN: ICD-10-CM

## 2020-01-28 DIAGNOSIS — C50.811 MALIGNANT NEOPLASM OF OVERLAPPING SITES OF RIGHT BREAST IN FEMALE, ESTROGEN RECEPTOR POSITIVE: ICD-10-CM

## 2020-01-28 DIAGNOSIS — C79.51 SECONDARY CANCER OF BONE: ICD-10-CM

## 2020-01-28 DIAGNOSIS — F41.9 ANXIETY: ICD-10-CM

## 2020-01-28 DIAGNOSIS — Z17.0 MALIGNANT NEOPLASM OF OVERLAPPING SITES OF RIGHT BREAST IN FEMALE, ESTROGEN RECEPTOR POSITIVE: ICD-10-CM

## 2020-01-28 DIAGNOSIS — C77.3 MALIGNANT NEOPLASM METASTATIC TO LYMPH NODE OF AXILLA: ICD-10-CM

## 2020-01-28 RX ORDER — LETROZOLE 2.5 MG/1
2.5 TABLET, FILM COATED ORAL DAILY
Qty: 90 TABLET | Refills: 1 | Status: SHIPPED | OUTPATIENT
Start: 2020-01-28 | End: 2020-01-30 | Stop reason: SDUPTHER

## 2020-01-29 DIAGNOSIS — C79.51 SECONDARY CANCER OF BONE: ICD-10-CM

## 2020-01-29 DIAGNOSIS — C50.811 MALIGNANT NEOPLASM OF OVERLAPPING SITES OF RIGHT BREAST IN FEMALE, ESTROGEN RECEPTOR POSITIVE: ICD-10-CM

## 2020-01-29 DIAGNOSIS — C77.3 MALIGNANT NEOPLASM METASTATIC TO LYMPH NODE OF AXILLA: ICD-10-CM

## 2020-01-29 DIAGNOSIS — Z17.0 MALIGNANT NEOPLASM OF OVERLAPPING SITES OF RIGHT BREAST IN FEMALE, ESTROGEN RECEPTOR POSITIVE: ICD-10-CM

## 2020-01-29 DIAGNOSIS — G89.3 NEOPLASM RELATED PAIN: ICD-10-CM

## 2020-01-29 NOTE — TELEPHONE ENCOUNTER
----- Message from Viviana Silva RN sent at 1/29/2020  4:00 PM CST -----  Contact: [facility]  Can you take care of this for me please?  ----- Message -----  From: Ashleigh Alejandro  Sent: 1/28/2020   3:26 PM CST  To: Baljeet Ramos Results    needs call back regarding ibrance refill status, due for delivery tomorrow...[phone]

## 2020-01-30 ENCOUNTER — TELEPHONE (OUTPATIENT)
Dept: PHARMACY | Facility: CLINIC | Age: 50
End: 2020-01-30

## 2020-01-30 ENCOUNTER — OFFICE VISIT (OUTPATIENT)
Dept: HEMATOLOGY/ONCOLOGY | Facility: CLINIC | Age: 50
End: 2020-01-30
Payer: MEDICAID

## 2020-01-30 ENCOUNTER — INFUSION (OUTPATIENT)
Dept: INFUSION THERAPY | Facility: HOSPITAL | Age: 50
End: 2020-01-30
Attending: INTERNAL MEDICINE
Payer: MEDICAID

## 2020-01-30 ENCOUNTER — LAB VISIT (OUTPATIENT)
Dept: LAB | Facility: HOSPITAL | Age: 50
End: 2020-01-30
Attending: INTERNAL MEDICINE
Payer: MEDICAID

## 2020-01-30 VITALS
RESPIRATION RATE: 18 BRPM | HEIGHT: 67 IN | BODY MASS INDEX: 36.29 KG/M2 | HEART RATE: 90 BPM | SYSTOLIC BLOOD PRESSURE: 124 MMHG | WEIGHT: 231.25 LBS | TEMPERATURE: 97 F | DIASTOLIC BLOOD PRESSURE: 74 MMHG | OXYGEN SATURATION: 98 %

## 2020-01-30 DIAGNOSIS — C50.811 MALIGNANT NEOPLASM OF OVERLAPPING SITES OF RIGHT BREAST IN FEMALE, ESTROGEN RECEPTOR POSITIVE: ICD-10-CM

## 2020-01-30 DIAGNOSIS — Z17.0 MALIGNANT NEOPLASM OF OVERLAPPING SITES OF RIGHT BREAST IN FEMALE, ESTROGEN RECEPTOR POSITIVE: ICD-10-CM

## 2020-01-30 DIAGNOSIS — C79.51 SECONDARY CANCER OF BONE: ICD-10-CM

## 2020-01-30 DIAGNOSIS — C79.51 SECONDARY CANCER OF BONE: Primary | ICD-10-CM

## 2020-01-30 DIAGNOSIS — G89.3 NEOPLASM RELATED PAIN: ICD-10-CM

## 2020-01-30 DIAGNOSIS — F32.A DEPRESSION, UNSPECIFIED DEPRESSION TYPE: ICD-10-CM

## 2020-01-30 DIAGNOSIS — F41.9 ANXIETY: ICD-10-CM

## 2020-01-30 DIAGNOSIS — C77.3 MALIGNANT NEOPLASM METASTATIC TO LYMPH NODE OF AXILLA: ICD-10-CM

## 2020-01-30 LAB
ALBUMIN SERPL BCP-MCNC: 3.8 G/DL (ref 3.5–5.2)
ALP SERPL-CCNC: 61 U/L (ref 55–135)
ALT SERPL W/O P-5'-P-CCNC: 39 U/L (ref 10–44)
ANION GAP SERPL CALC-SCNC: 8 MMOL/L (ref 8–16)
ANISOCYTOSIS BLD QL SMEAR: SLIGHT
AST SERPL-CCNC: 25 U/L (ref 10–40)
BASOPHILS # BLD AUTO: 0.07 K/UL (ref 0–0.2)
BASOPHILS NFR BLD: 1.4 % (ref 0–1.9)
BILIRUB SERPL-MCNC: 0.4 MG/DL (ref 0.1–1)
BUN SERPL-MCNC: 10 MG/DL (ref 6–20)
CALCIUM SERPL-MCNC: 9.1 MG/DL (ref 8.7–10.5)
CHLORIDE SERPL-SCNC: 104 MMOL/L (ref 95–110)
CO2 SERPL-SCNC: 26 MMOL/L (ref 23–29)
CREAT SERPL-MCNC: 1.3 MG/DL (ref 0.5–1.4)
DIFFERENTIAL METHOD: ABNORMAL
EOSINOPHIL # BLD AUTO: 0.1 K/UL (ref 0–0.5)
EOSINOPHIL NFR BLD: 1.8 % (ref 0–8)
ERYTHROCYTE [DISTWIDTH] IN BLOOD BY AUTOMATED COUNT: 13.1 % (ref 11.5–14.5)
EST. GFR  (AFRICAN AMERICAN): 55 ML/MIN/1.73 M^2
EST. GFR  (NON AFRICAN AMERICAN): 48 ML/MIN/1.73 M^2
GLUCOSE SERPL-MCNC: 120 MG/DL (ref 70–110)
HCT VFR BLD AUTO: 38.7 % (ref 37–48.5)
HGB BLD-MCNC: 13 G/DL (ref 12–16)
IMM GRANULOCYTES # BLD AUTO: 0.11 K/UL (ref 0–0.04)
IMM GRANULOCYTES NFR BLD AUTO: 2.2 % (ref 0–0.5)
LYMPHOCYTES # BLD AUTO: 1.6 K/UL (ref 1–4.8)
LYMPHOCYTES NFR BLD: 30.8 % (ref 18–48)
MCH RBC QN AUTO: 37.9 PG (ref 27–31)
MCHC RBC AUTO-ENTMCNC: 33.6 G/DL (ref 32–36)
MCV RBC AUTO: 113 FL (ref 82–98)
MONOCYTES # BLD AUTO: 0.6 K/UL (ref 0.3–1)
MONOCYTES NFR BLD: 11.6 % (ref 4–15)
NEUTROPHILS # BLD AUTO: 2.7 K/UL (ref 1.8–7.7)
NEUTROPHILS NFR BLD: 52.2 % (ref 38–73)
NRBC BLD-RTO: 0 /100 WBC
OVALOCYTES BLD QL SMEAR: ABNORMAL
PLATELET # BLD AUTO: 238 K/UL (ref 150–350)
PLATELET BLD QL SMEAR: ABNORMAL
PMV BLD AUTO: 10.3 FL (ref 9.2–12.9)
POIKILOCYTOSIS BLD QL SMEAR: SLIGHT
POTASSIUM SERPL-SCNC: 4.5 MMOL/L (ref 3.5–5.1)
PROT SERPL-MCNC: 7.1 G/DL (ref 6–8.4)
RBC # BLD AUTO: 3.43 M/UL (ref 4–5.4)
SODIUM SERPL-SCNC: 138 MMOL/L (ref 136–145)
WBC # BLD AUTO: 5.07 K/UL (ref 3.9–12.7)

## 2020-01-30 PROCEDURE — 63600175 PHARM REV CODE 636 W HCPCS: Mod: JG | Performed by: INTERNAL MEDICINE

## 2020-01-30 PROCEDURE — 99215 OFFICE O/P EST HI 40 MIN: CPT | Mod: S$PBB,,, | Performed by: INTERNAL MEDICINE

## 2020-01-30 PROCEDURE — 85025 COMPLETE CBC W/AUTO DIFF WBC: CPT

## 2020-01-30 PROCEDURE — 99999 PR PBB SHADOW E&M-EST. PATIENT-LVL IV: ICD-10-PCS | Mod: PBBFAC,,, | Performed by: INTERNAL MEDICINE

## 2020-01-30 PROCEDURE — 96372 THER/PROPH/DIAG INJ SC/IM: CPT

## 2020-01-30 PROCEDURE — 36415 COLL VENOUS BLD VENIPUNCTURE: CPT

## 2020-01-30 PROCEDURE — 80053 COMPREHEN METABOLIC PANEL: CPT

## 2020-01-30 PROCEDURE — 99999 PR PBB SHADOW E&M-EST. PATIENT-LVL IV: CPT | Mod: PBBFAC,,, | Performed by: INTERNAL MEDICINE

## 2020-01-30 PROCEDURE — 99215 PR OFFICE/OUTPT VISIT, EST, LEVL V, 40-54 MIN: ICD-10-PCS | Mod: S$PBB,,, | Performed by: INTERNAL MEDICINE

## 2020-01-30 PROCEDURE — 99214 OFFICE O/P EST MOD 30 MIN: CPT | Mod: PBBFAC,25 | Performed by: INTERNAL MEDICINE

## 2020-01-30 RX ORDER — LETROZOLE 2.5 MG/1
2.5 TABLET, FILM COATED ORAL DAILY
Qty: 90 TABLET | Refills: 3 | Status: SHIPPED | OUTPATIENT
Start: 2020-01-30 | End: 2021-02-03 | Stop reason: SDUPTHER

## 2020-01-30 RX ADMIN — DENOSUMAB 120 MG: 120 INJECTION SUBCUTANEOUS at 11:01

## 2020-01-30 NOTE — TELEPHONE ENCOUNTER
LVM for callback to inform patient that [facility] received prescription for Letrozole and benefits investigation is required.  [facility] will be back in touch once insurance determination is received.

## 2020-01-30 NOTE — Clinical Note
Dear Dr. Jason, I noticed TSH and Tg elevated in 11/2019 and wanted to get your thoughts on further eval for this, ty

## 2020-01-30 NOTE — PROGRESS NOTES
Subjective:      DATE OF VISIT: 1/30/2020   ?   ?   Patient ID:?Josey Flores is a 49 y.o. female.?? MR#: [id number]   ?   PRIMARY PROVIDER: Dr. Asencio  ?   CHIEF COMPLAINT:  Follow-up, on therapy for metastatic breast?????   ?   ONCOLOGIC DIAGNOSIS:      stage IV, T2 N1b M1, invasive breast carcinoma, ER/MD+, HER2-    Papillary thyroid carcinoma status post total thyroidectomy on 08/31/2017, mpT1b N0    Cervical HSIL MIREILLE 3 s/p LEEP, 2017  ?   CURRENT TREATMENT: palbociclib and letrozole    HPI    She notes continued difficulty with right upper quadrant pain for several years without notable change.  She also notes diffuse weakness in her legs and difficulty walking which is not new for her.  No new areas of pain. No fevers, chills, night sweats or unintentional weight loss.    ONCOLOGIC HISTORY:      initial diagnosis after self palpation of mass near right nipple.  Further imaging revealed two lesions in right breast, axillary lymph node.  PET-CT in January 2017 revealed metastatic disease involving 2 lesions in right breast, right axilla, and metastatic disease in manubrium as well as left posterior ilium.  Thyroid was also noted to have avidity.  This was found to be papillary thyroid cancer and she underwent total thyroidectomy.  She notes development of bilateral upper extremity edema and underwent palliative bilateral axillary resection as well as resection of metastatic focus in left ilial region per patient report.     She also has a history of HSIL MIREILLE 3 status post LEEP in 2017.  She has not had follow-up recently with her gynecologist.  She denies vaginal bleeding.    During her visit with me today she notes chronic unchanged pain in lower pelvis above pelvic bone without associated edema. She also has pain in bilateral shoulders diffusely and occasionally sharper pain in the right upper quadrant which comes and goes without clear exacerbating or relieving factors.  She does use Norco every 4-6 hours,  last refill on 10/07/2019.  She continues to smoke daily.    Review of Systems    ?   A comprehensive 14-point review of systems was reviewed with patient and was negative other than as specified above.   ?     Objective:      Physical Exam      ?   Vitals:    01/30/20 1014   BP: 124/74   Pulse: 90   Resp: 18   Temp: 96.9 °F (36.1 °C)      ?   ECOG:?0   General appearance: Generally well appearing, in no acute distress.   Head, eyes, ears, nose, and throat: Oropharynx clear with moist mucous membranes.   Cardiovascular: Regular rate and rhythm, S1, S2, no audible murmurs.   Respiratory: Lungs clear to auscultation bilaterally.   Abdomen: nontender, nondistended.   Extremities: Warm, without edema.   Neurologic: Alert and oriented. Grossly normal strength, coordination, and gait.   Skin: No rashes, ecchymoses or petechial lesion.  Bilateral axilla with surgical scars.  Psychiatric: normal mood and affect, conversant and appropriate    ?   Laboratory:  ?   Lab Visit on 01/30/2020   Component Date Value Ref Range Status    WBC 01/30/2020 5.07  3.90 - 12.70 K/uL Final    RBC 01/30/2020 3.43* 4.00 - 5.40 M/uL Final    Hemoglobin 01/30/2020 13.0  12.0 - 16.0 g/dL Final    Hematocrit 01/30/2020 38.7  37.0 - 48.5 % Final    Mean Corpuscular Volume 01/30/2020 113* 82 - 98 fL Final    Mean Corpuscular Hemoglobin 01/30/2020 37.9* 27.0 - 31.0 pg Final    Mean Corpuscular Hemoglobin Conc 01/30/2020 33.6  32.0 - 36.0 g/dL Final    RDW 01/30/2020 13.1  11.5 - 14.5 % Final    Platelets 01/30/2020 238  150 - 350 K/uL Final    MPV 01/30/2020 10.3  9.2 - 12.9 fL Final    Sodium 01/30/2020 138  136 - 145 mmol/L Final    Potassium 01/30/2020 4.5  3.5 - 5.1 mmol/L Final    Chloride 01/30/2020 104  95 - 110 mmol/L Final    CO2 01/30/2020 26  23 - 29 mmol/L Final    Glucose 01/30/2020 120* 70 - 110 mg/dL Final    BUN, Bld 01/30/2020 10  6 - 20 mg/dL Final    Creatinine 01/30/2020 1.3  0.5 - 1.4 mg/dL Final    Calcium  01/30/2020 9.1  8.7 - 10.5 mg/dL Final    Total Protein 01/30/2020 7.1  6.0 - 8.4 g/dL Final    Albumin 01/30/2020 3.8  3.5 - 5.2 g/dL Final    Total Bilirubin 01/30/2020 0.4  0.1 - 1.0 mg/dL Final    Alkaline Phosphatase 01/30/2020 61  55 - 135 U/L Final    AST 01/30/2020 25  10 - 40 U/L Final    ALT 01/30/2020 39  10 - 44 U/L Final    Anion Gap 01/30/2020 8  8 - 16 mmol/L Final    eGFR if African American 01/30/2020 55* >60 mL/min/1.73 m^2 Final    eGFR if non African American 01/30/2020 48* >60 mL/min/1.73 m^2 Final      ? IMAGING 9/11/19  CT CHEST WITH CONTRAST; CT ABDOMEN PELVIS WITH CONTRAST    CLINICAL HISTORY:  follow up of breast cancer;; f/u metastatic breast cancer;Cough    TECHNIQUE:  Low dose axial images, sagittal and coronal reformations were obtained from the thoracic inlet to the pubic synthesis following the oral administration of 30 mL of Omnipaque 350, and IV 75 mL Omnipaque 350.    COMPARISON:  08/24/2019 CT abdomen pelvis    FINDINGS:  Thoracic soft tissues: No significant abnormality.    Aorta: Normal in course and caliber, without significant atherosclerotic plaque. There are three branching vessels at the arch.    Heart: Normal in size. No pericardial effusion.    Breanna/Mediastinum: Calcified right hilar lymph nodes.  No lymphadenopathy.    Lungs: Moderate severity upper lobe predominant centrilobular emphysema.  Calcified right upper lobe granuloma.  Unchanged 5 mm nodule anterior aspect right upper lobe series 2, image 32; this is unchanged compared to 01/17/2017.  No new nodules.    Liver: Fatty infiltration of the liver.  No appreciable focal liver lesions.    Gallbladder: Surgically absent.    Bile Ducts: No evidence of dilated ducts.    Pancreas: No mass or peripancreatic fat stranding.    Spleen: Calcified granulomas.    Adrenals: Unremarkable.    Kidneys/ Ureters: Normal in size and location. Normal concentration and excretion of contrast. No hydronephrosis or  nephrolithiasis. No ureteral dilatation.    Bladder: No evidence of wall thickening.    Reproductive organs: Unremarkable.    GI Tract/Mesentery: No evidence of bowel obstruction or inflammation. Normal transit of oral contrast.    Peritoneal Space: No ascites. No free air.    Retroperitoneum: No significant adenopathy.    Abdominal wall: Unremarkable.    Vasculature: No significant atherosclerosis or aneurysm.    Bones: Unchanged left and right iliac lytic lesions compared to multiple prior studies.  No new lesions.      Impression       Stable exam compared to 02/18/2019.       ?   Assessment/Plan:       1. Malignant neoplasm of overlapping sites of right breast in female, estrogen receptor positive    2. Malignant neoplasm metastatic to lymph node of axilla    3. Secondary cancer of bone    4. Neoplasm related pain    5. Anxiety    6. Depression, unspecified depression type          Plan:     # metastatic breast:  Continues on systemic therapy with palbociclib and letrozole, tolerating well.  Last staging imaging on 09/11/2019 I have reviewed and demonstrate stable disease over the last several months.  We discussed plan for restaging exams q.6 months, and will plan to get in early March 2020.  Today she notes chronic pain issues including right upper quadrant pain for several years which has been imaged several times without clear etiology.  Will get repeat scan in about a month and follow-up afterwards for restaging.     # bony metastatic disease:  Continues on prophylactic denosumab, calcium and vitamin-D supplementation, given 1/30/20, [id number] next due 4/30/20    # tobacco use:  Counseled on importance of cessation, continues to smoke.    # HSIL:  Status post LEEP 2017, encouraged follow-up with her gynecologist for surveillance, no current acute issues/vaginal bleeding.    # history of papillary thyroid carcinoma status post total thyroidectomy on 08/31/2017: s/p total thyroidectomy on levothyroxine.   Metastatic breast cancer restaging imaging without findings for locally recurrent disease.  Follows with Dr. Jason in endocrinology last seen 11/2019 with repeated TSH and thyroglobulin newly elevated.  Will contact to discuss necessary follow-up.    Follow-Up:   - restaging scans in 1 month with creatinine labs and RV after this

## 2020-01-31 DIAGNOSIS — Z85.850 HISTORY OF THYROID CANCER: Primary | ICD-10-CM

## 2020-01-31 DIAGNOSIS — E89.0 POSTOPERATIVE HYPOTHYROIDISM: ICD-10-CM

## 2020-02-03 ENCOUNTER — TELEPHONE (OUTPATIENT)
Dept: ENDOCRINOLOGY | Facility: CLINIC | Age: 50
End: 2020-02-03

## 2020-02-03 DIAGNOSIS — F41.9 ANXIETY: ICD-10-CM

## 2020-02-03 RX ORDER — ALPRAZOLAM 0.5 MG/1
0.5 TABLET ORAL 2 TIMES DAILY PRN
Qty: 60 TABLET | Refills: 0 | Status: SHIPPED | OUTPATIENT
Start: 2020-02-03 | End: 2020-03-02 | Stop reason: SDUPTHER

## 2020-02-05 ENCOUNTER — TELEPHONE (OUTPATIENT)
Dept: ENDOCRINOLOGY | Facility: CLINIC | Age: 50
End: 2020-02-05

## 2020-02-05 DIAGNOSIS — C50.811 MALIGNANT NEOPLASM OF OVERLAPPING SITES OF RIGHT BREAST IN FEMALE, ESTROGEN RECEPTOR POSITIVE: ICD-10-CM

## 2020-02-05 DIAGNOSIS — C79.51 SECONDARY CANCER OF BONE: ICD-10-CM

## 2020-02-05 DIAGNOSIS — Z17.0 MALIGNANT NEOPLASM OF OVERLAPPING SITES OF RIGHT BREAST IN FEMALE, ESTROGEN RECEPTOR POSITIVE: ICD-10-CM

## 2020-02-05 DIAGNOSIS — C77.3 MALIGNANT NEOPLASM METASTATIC TO LYMPH NODE OF AXILLA: ICD-10-CM

## 2020-02-05 DIAGNOSIS — G89.3 NEOPLASM RELATED PAIN: ICD-10-CM

## 2020-02-05 RX ORDER — HYDROCODONE BITARTRATE AND ACETAMINOPHEN 7.5; 325 MG/1; MG/1
1 TABLET ORAL EVERY 6 HOURS PRN
Qty: 120 TABLET | Refills: 0 | Status: SHIPPED | OUTPATIENT
Start: 2020-02-05 | End: 2020-03-06 | Stop reason: SDUPTHER

## 2020-02-05 NOTE — TELEPHONE ENCOUNTER
"Spoke with pt, she states she takes her Synthroid on an empty stomach waits 30 minutes before eating does not take Vitamins and Calcium at lunch 4-5 hours after taking Synthroid. Lab work scheduled  Pt would like to be seen sooner than march has been having problems with her throat "in area of Thyroid" difficulty swallowing pt scheduled in  "

## 2020-02-06 ENCOUNTER — OFFICE VISIT (OUTPATIENT)
Dept: ENDOCRINOLOGY | Facility: CLINIC | Age: 50
End: 2020-02-06
Payer: MEDICAID

## 2020-02-06 VITALS
SYSTOLIC BLOOD PRESSURE: 130 MMHG | HEIGHT: 67 IN | RESPIRATION RATE: 18 BRPM | WEIGHT: 228.63 LBS | DIASTOLIC BLOOD PRESSURE: 78 MMHG | BODY MASS INDEX: 35.88 KG/M2 | HEART RATE: 84 BPM

## 2020-02-06 DIAGNOSIS — C50.811 MALIGNANT NEOPLASM OF OVERLAPPING SITES OF RIGHT BREAST IN FEMALE, ESTROGEN RECEPTOR POSITIVE: ICD-10-CM

## 2020-02-06 DIAGNOSIS — R60.0 SUBMANDIBULAR GLAND SWELLING: ICD-10-CM

## 2020-02-06 DIAGNOSIS — R53.83 FATIGUE, UNSPECIFIED TYPE: ICD-10-CM

## 2020-02-06 DIAGNOSIS — Z17.0 MALIGNANT NEOPLASM OF OVERLAPPING SITES OF RIGHT BREAST IN FEMALE, ESTROGEN RECEPTOR POSITIVE: ICD-10-CM

## 2020-02-06 DIAGNOSIS — E89.0 POSTOPERATIVE HYPOTHYROIDISM: ICD-10-CM

## 2020-02-06 DIAGNOSIS — Z85.850 HISTORY OF THYROID CANCER: Primary | ICD-10-CM

## 2020-02-06 PROCEDURE — 99214 PR OFFICE/OUTPT VISIT, EST, LEVL IV, 30-39 MIN: ICD-10-PCS | Mod: S$PBB,,, | Performed by: INTERNAL MEDICINE

## 2020-02-06 PROCEDURE — 99213 OFFICE O/P EST LOW 20 MIN: CPT | Mod: PBBFAC | Performed by: INTERNAL MEDICINE

## 2020-02-06 PROCEDURE — 99999 PR PBB SHADOW E&M-EST. PATIENT-LVL III: CPT | Mod: PBBFAC,,, | Performed by: INTERNAL MEDICINE

## 2020-02-06 PROCEDURE — 99999 PR PBB SHADOW E&M-EST. PATIENT-LVL III: ICD-10-PCS | Mod: PBBFAC,,, | Performed by: INTERNAL MEDICINE

## 2020-02-06 PROCEDURE — 99214 OFFICE O/P EST MOD 30 MIN: CPT | Mod: S$PBB,,, | Performed by: INTERNAL MEDICINE

## 2020-02-06 NOTE — PROGRESS NOTES
Patient ID: Josey Flores is a 49 y.o. female.  Patient is here for follow up        Chief Complaint: No chief complaint on file.      HPI     Patient with history of Metastatic multifocal infiltrating ductal carcinoma of the right breast with right axillary and bone involvement [ER positive/MD positive/HER-2 negative] for which she has received adjuvant therapy with good response with no FDG avid areas remaining regarding her breast cancer, who was found to have an FDG avid area on PET scan in the right thyroid, FNA was suspicious for papillary thyroid cancer, she subsequently underwent a total thyroidectomy performed by Dr. Zaid Baugh on August 31, 2017 and final pathology showed the following:  Thyroid, total thyroidectomy:  Papillary carcinoma, follicular variant, 2 cm, [id number].  Second focus of micropapillary carcinoma adjacent to main tumor.  Two lymph nodes negative for carcinoma.  See note and synoptic report below.  Note: Reviewed by Dr. Lois Ortiz who concurs with the diagnosis.  Synoptic report: Thyroid Gland  Procedure: Total thyroidectomy.  Tumor focality: Multifocal.  Tumor site: Right lobe.  Tumor size: 2 x 2 x 1.5 cm.  Histologic type: Papillary carcinoma, follicular variant, encapsulated/well demarcated, with tumor capsular  invasion.  Margins: Uninvolved by carcinoma.  Angioinvasion: Not identified.  Lymphatic invasion: Not identified.  Extrathyroidal extension: Not identified.  Regional lymph nodes:  -Number of lymph nodes involved: 0.  -Number of lymph nodes examined: 2 (incidental perithyroidal).  Pathologic Stage Classification: [id number].    She did not receive radioactive iodine treatment.  She and her  at the time did not want to pursue any further treatment be on thyroidectomy especially since she was dealing with her breast cancer diagnosis    She is currently on levothyroxine 150 mcg, increased from 137 µg at last visit and takes a multivitamin and calcium 2000 mg  daily-TUMS-    Postoperatively her calcium was low but subsequent calcium levels have been fine    Thyroglobulin levels have been negative ; once when her TSH was 55 her thyroglobulin was 8.1, at that time she was taking calcium along with her thyroid medication    Neck ultrasound 2019 look fine with no pathologic lesions  She is on Ibrance and xgeva    Patient is a smoker and has COPD and has chronic dyspnea on exertion    She complains of a cough mostly at nighttime and has had some tiredness and fatigue      Also history of anxiety and depression and see psychiatry  She has been suffering from depression and having a hard time adjusting to her diagnosis  Denies swelling other than her chronic intermittent ankle swelling that has not worsened    In the past had  MRI findings showing metastasis in her spine and pelvis she has undergone radiation    She complains of sharp intermittent pains in various places such as and her upper abdomen, or pelvic area or back area and occasionally her appetite is not the best    Weight is down since last visit, denies any palpitations or sweats  Her last PET scan can showed the following:  PET scan 2017 that showed the followin. Interval thyroidectomy with some uptake noted in the region of the thyroid bed which is thought to be postsurgical in nature.     2.  Stable appearance of non-FDG avid osseus metastatic lesions involving the left ilium and manubrium.      I have reviewed the past medical, family and social history    Review of Systems   Constitutional: Negative for appetite change, fatigue, fever and unexpected weight change.   HENT: Negative for sore throat and trouble swallowing.    Eyes: Negative for visual disturbance.   Respiratory: Negative for shortness of breath and wheezing.    Cardiovascular: Negative for chest pain, palpitations and leg swelling.   Gastrointestinal: Negative for diarrhea, nausea and vomiting.   Endocrine: Negative for  cold intolerance, heat intolerance, polydipsia, polyphagia and polyuria.   Genitourinary: Negative for difficulty urinating, dysuria and menstrual problem.   Musculoskeletal: Positive for arthralgias. Negative for joint swelling.   Skin: Negative for rash.   Neurological: Negative for dizziness, weakness, numbness and headaches.   Psychiatric/Behavioral: Negative for confusion, dysphoric mood and sleep disturbance.       Objective:      Physical Exam   Constitutional: She appears well-developed and well-nourished. No distress.   HENT:   Head: Normocephalic and atraumatic.   Eyes: Conjunctivae are normal.   Neck: No JVD present. No tracheal deviation present. No thyromegaly present.   Cardiovascular: Normal rate, regular rhythm, normal heart sounds and intact distal pulses. Exam reveals no gallop and no friction rub.   No murmur heard.  Pulmonary/Chest: Effort normal and breath sounds normal. No stridor. No respiratory distress. She has no wheezes. She has no rales. She exhibits no tenderness.   Musculoskeletal: She exhibits no edema or deformity.   Lymphadenopathy:     She has no cervical adenopathy.   Neurological: She is alert.   Skin: No rash noted. She is not diaphoretic. No erythema. No pallor.   Vitals reviewed.        Lab Review:   Lab Visit on 01/30/2020   Component Date Value    WBC 01/30/2020 5.07     RBC 01/30/2020 3.43*    Hemoglobin 01/30/2020 13.0     Hematocrit 01/30/2020 38.7     Mean Corpuscular Volume 01/30/2020 113*    Mean Corpuscular Hemoglo* 01/30/2020 37.9*    Mean Corpuscular Hemoglo* 01/30/2020 33.6     RDW 01/30/2020 13.1     Platelets 01/30/2020 238     MPV 01/30/2020 10.3     Immature Granulocytes 01/30/2020 2.2*    Gran # (ANC) 01/30/2020 2.7     Immature Grans (Abs) 01/30/2020 0.11*    Lymph # 01/30/2020 1.6     Mono # 01/30/2020 0.6     Eos # 01/30/2020 0.1     Baso # 01/30/2020 0.07     nRBC 01/30/2020 0     Gran% 01/30/2020 52.2     Lymph% 01/30/2020 30.8      Mono% 01/30/2020 11.6     Eosinophil% 01/30/2020 1.8     Basophil% 01/30/2020 1.4     Platelet Estimate 01/30/2020 Appears normal     Aniso 01/30/2020 Slight     Poik 01/30/2020 Slight     Ovalocytes 01/30/2020 Occasional     Differential Method 01/30/2020 Automated     Sodium 01/30/2020 138     Potassium 01/30/2020 4.5     Chloride 01/30/2020 104     CO2 01/30/2020 26     Glucose 01/30/2020 120*    BUN, Bld 01/30/2020 10     Creatinine 01/30/2020 1.3     Calcium 01/30/2020 9.1     Total Protein 01/30/2020 7.1     Albumin 01/30/2020 3.8     Total Bilirubin 01/30/2020 0.4     Alkaline Phosphatase 01/30/2020 61     AST 01/30/2020 25     ALT 01/30/2020 39     Anion Gap 01/30/2020 8     eGFR if African American 01/30/2020 55*    eGFR if non African Amer* 01/30/2020 48*   Lab Visit on 11/05/2019   Component Date Value    TSH 11/05/2019 10.778*    Free T4 11/05/2019 0.91     Thyroglobulin, Tumor Mar* 11/05/2019 6.3*    Thyroglobulin Antibody S* 11/05/2019 <1.8     Thyroglobulin Interpreta* 11/05/2019 SEE BELOW    Lab Visit on 10/23/2019   Component Date Value    WBC 10/23/2019 4.28     RBC 10/23/2019 3.27*    Hemoglobin 10/23/2019 12.3     Hematocrit 10/23/2019 35.7*    Mean Corpuscular Volume 10/23/2019 109*    Mean Corpuscular Hemoglo* 10/23/2019 37.6*    Mean Corpuscular Hemoglo* 10/23/2019 34.5     RDW 10/23/2019 13.7     Platelets 10/23/2019 187     MPV 10/23/2019 10.4     Immature Granulocytes 10/23/2019 1.4*    Gran # (ANC) 10/23/2019 1.9     Immature Grans (Abs) 10/23/2019 0.06*    Lymph # 10/23/2019 1.7     Mono # 10/23/2019 0.5     Eos # 10/23/2019 0.1     Baso # 10/23/2019 0.06     nRBC 10/23/2019 0     Gran% 10/23/2019 45.1     Lymph% 10/23/2019 39.5     Mono% 10/23/2019 11.4     Eosinophil% 10/23/2019 1.2     Basophil% 10/23/2019 1.4     Differential Method 10/23/2019 Automated     Sodium 10/23/2019 141     Potassium 10/23/2019 4.1     Chloride  10/23/2019 106     CO2 10/23/2019 25     Glucose 10/23/2019 95     BUN, Bld 10/23/2019 8     Creatinine 10/23/2019 1.2     Calcium 10/23/2019 9.1     Total Protein 10/23/2019 6.9     Albumin 10/23/2019 3.7     Total Bilirubin 10/23/2019 0.4     Alkaline Phosphatase 10/23/2019 54*    AST 10/23/2019 19     ALT 10/23/2019 27     Anion Gap 10/23/2019 10     eGFR if African American 10/23/2019 >60     eGFR if non  Amer* 10/23/2019 53*   Lab Visit on 09/26/2019   Component Date Value    Sodium 09/26/2019 142     Potassium 09/26/2019 4.7     Chloride 09/26/2019 105     CO2 09/26/2019 27     Glucose 09/26/2019 102     BUN, Bld 09/26/2019 10     Creatinine 09/26/2019 1.4     Calcium 09/26/2019 9.6     Total Protein 09/26/2019 7.1     Albumin 09/26/2019 3.8     Total Bilirubin 09/26/2019 0.4     Alkaline Phosphatase 09/26/2019 59     AST 09/26/2019 25     ALT 09/26/2019 34     Anion Gap 09/26/2019 10     eGFR if  09/26/2019 51*    eGFR if non African Amer* 09/26/2019 44*    WBC 09/26/2019 4.19     RBC 09/26/2019 3.47*    Hemoglobin 09/26/2019 13.0     Hematocrit 09/26/2019 37.6     Mean Corpuscular Volume 09/26/2019 108*    Mean Corpuscular Hemoglo* 09/26/2019 37.5*    Mean Corpuscular Hemoglo* 09/26/2019 34.6     RDW 09/26/2019 13.7     Platelets 09/26/2019 283     MPV 09/26/2019 10.5     Gran # (ANC) 09/26/2019 2.4     Lymph # 09/26/2019 1.5     Mono # 09/26/2019 0.2*    Eos # 09/26/2019 0.1     Baso # 09/26/2019 0.04     Gran% 09/26/2019 56.8     Lymph% 09/26/2019 36.5     Mono% 09/26/2019 4.5     Eosinophil% 09/26/2019 1.4     Basophil% 09/26/2019 1.0     Differential Method 09/26/2019 Automated    Admission on 08/23/2019, Discharged on 08/24/2019   Component Date Value    WBC 08/23/2019 4.31     RBC 08/23/2019 3.52*    Hemoglobin 08/23/2019 13.1     Hematocrit 08/23/2019 37.8     Mean Corpuscular Volume 08/23/2019 107*    Mean Corpuscular  Hemoglo* 08/23/2019 37.2*    Mean Corpuscular Hemoglo* 08/23/2019 34.7     RDW 08/23/2019 13.8     Platelets 08/23/2019 286     MPV 08/23/2019 10.5     Gran # (ANC) 08/23/2019 2.2     Lymph # 08/23/2019 1.7     Mono # 08/23/2019 0.3     Eos # 08/23/2019 0.1     Baso # 08/23/2019 0.03     Gran% 08/23/2019 50.6     Lymph% 08/23/2019 40.1     Mono% 08/23/2019 6.5     Eosinophil% 08/23/2019 2.3     Basophil% 08/23/2019 0.7     Differential Method 08/23/2019 Automated     Sodium 08/23/2019 136     Potassium 08/23/2019 3.8     Chloride 08/23/2019 104     CO2 08/23/2019 22*    Glucose 08/23/2019 98     BUN, Bld 08/23/2019 17     Creatinine 08/23/2019 1.4     Calcium 08/23/2019 9.4     Total Protein 08/23/2019 7.4     Albumin 08/23/2019 3.9     Total Bilirubin 08/23/2019 0.4     Alkaline Phosphatase 08/23/2019 49*    AST 08/23/2019 23     ALT 08/23/2019 25     Anion Gap 08/23/2019 10     eGFR if  08/23/2019 51*    eGFR if non  Amer* 08/23/2019 44*    Specimen UA 08/24/2019 Urine, Clean Catch     Color, UA 08/24/2019 Yellow     Appearance, UA 08/24/2019 Clear     pH, UA 08/24/2019 6.0     Specific Gravity, UA 08/24/2019 >=1.030*    Protein, UA 08/24/2019 Negative     Glucose, UA 08/24/2019 Negative     Ketones, UA 08/24/2019 Negative     Bilirubin (UA) 08/24/2019 Negative     Occult Blood UA 08/24/2019 3+*    Nitrite, UA 08/24/2019 Negative     Urobilinogen, UA 08/24/2019 Negative     Leukocytes, UA 08/24/2019 Trace*    Amylase 08/23/2019 69     Lipase 08/23/2019 41     RBC, UA 08/24/2019 10*    WBC, UA 08/24/2019 30*    Bacteria 08/24/2019 Many*    Squam Epithel, UA 08/24/2019 2     Microscopic Comment 08/24/2019 SEE COMMENT        Assessment:     No diagnosis found. continue Synthroid, check labs today  Plan:   There are no diagnoses linked to this encounter.      No follow-ups on file.    Labs prior to appointment? not applicable     Disclaimer:   This note may have been partially prepared using voice recognition software and  it may have not been extensively proofed, as such there could be errors within the text such as sound alike errors.

## 2020-02-06 NOTE — PROGRESS NOTES
Patient ID: Josey Flores is a 49 y.o. female.  Patient is here for follow up        Chief Complaint: Follow-up (problems with throat)      Follow-up   Associated symptoms include arthralgias. Pertinent negatives include no chest pain, fatigue, fever, headaches, joint swelling, nausea, numbness, rash, sore throat, vomiting or weakness.        Patient with history of Metastatic multifocal infiltrating ductal carcinoma of the right breast with right axillary and bone involvement [ER positive/CA positive/HER-2 negative] for which she has received adjuvant therapy with good response with no FDG avid areas remaining regarding her breast cancer, who was found to have an FDG avid area on PET scan in the right thyroid, FNA was suspicious for papillary thyroid cancer, she subsequently underwent a total thyroidectomy performed by Dr. Zaid Baugh on August 31, 2017 and final pathology showed the following:  Thyroid, total thyroidectomy:  Papillary carcinoma, follicular variant, 2 cm, [id number].  Second focus of micropapillary carcinoma adjacent to main tumor.  Two lymph nodes negative for carcinoma.  See note and synoptic report below.  Note: Reviewed by Dr. Lois Ortiz who concurs with the diagnosis.  Synoptic report: Thyroid Gland  Procedure: Total thyroidectomy.  Tumor focality: Multifocal.  Tumor site: Right lobe.  Tumor size: 2 x 2 x 1.5 cm.  Histologic type: Papillary carcinoma, follicular variant, encapsulated/well demarcated, with tumor capsular  invasion.  Margins: Uninvolved by carcinoma.  Angioinvasion: Not identified.  Lymphatic invasion: Not identified.  Extrathyroidal extension: Not identified.  Regional lymph nodes:  -Number of lymph nodes involved: 0.  -Number of lymph nodes examined: 2 (incidental perithyroidal).  Pathologic Stage Classification: [id number].    She did not receive radioactive iodine treatment.  She and her  at the time did not want to pursue any further treatment be on thyroidectomy  especially since she was dealing with her breast cancer diagnosis    She is followed by oncology and currently is on Ibrance, a kinase inhibitor and also on letrozole and has received xgeva/denosamab    She is currently on levothyroxine 150 mcg,  and takes a multivitamin and calcium 2000 mg daily-TUMS-and she make sure to space her vitamins 4-5 hours from her thyroid medication    Postoperatively her calcium was low but subsequent calcium levels have been fine    Thyroglobulin levels have been negative however her TSH was elevated on most recent test to 10 and her thyroglobulin was 6.3 ; once in the past when her TSH was 55 her thyroglobulin was 8.1, at that time she was taking calcium along with her thyroid medication    Neck ultrasound 2019 looked fine with no pathologic lesions  She is on Ibrance and xgeva    Patient is a smoker and has COPD and has chronic dyspnea on exertion    She complains of a cough mostly at nighttime and has had some tiredness and fatigue      Also history of anxiety and depression and see psychiatry  She has been suffering from depression and having a hard time adjusting to her diagnosis  Denies swelling other than her chronic intermittent ankle swelling that has not worsened    In the past had  MRI findings showing metastasis in her spine and pelvis she has undergone radiation    She complains of sharp intermittent pains in various places such as and her upper abdomen, or pelvic area or back area and occasionally her appetite is not the best    Weight is overall stable , denies any palpitations or sweats  A previous PET scan can showed the following:  PET scan 2017 that showed the followin. Interval thyroidectomy with some uptake noted in the region of the thyroid bed which is thought to be postsurgical in nature.     2.  Stable appearance of non-FDG avid osseus metastatic lesions involving the left ilium and manubrium.    She is concerned about not being able to  lose weight and lately the left anterior side underneath her mandible area is more sore in might of been more enlarged a week ago, has improved somewhat but   I have reviewed the past medical, family and social history    Review of Systems   Constitutional: Negative for appetite change, fatigue, fever and unexpected weight change.   HENT: Negative for sore throat and trouble swallowing.    Eyes: Negative for visual disturbance.   Respiratory: Negative for shortness of breath and wheezing.    Cardiovascular: Negative for chest pain, palpitations and leg swelling.   Gastrointestinal: Negative for diarrhea, nausea and vomiting.   Endocrine: Negative for cold intolerance, heat intolerance, polydipsia, polyphagia and polyuria.   Genitourinary: Negative for difficulty urinating, dysuria and menstrual problem.   Musculoskeletal: Positive for arthralgias. Negative for joint swelling.   Skin: Negative for rash.   Neurological: Negative for dizziness, weakness, numbness and headaches.   Psychiatric/Behavioral: Negative for confusion, dysphoric mood and sleep disturbance.       Objective:      Physical Exam   Constitutional: She appears well-developed and well-nourished. No distress.   HENT:   Head: Normocephalic and atraumatic.   Eyes: Conjunctivae are normal. No scleral icterus.   Neck: No JVD present. No tracheal deviation present. No thyromegaly present.   Patient has some asymmetry with enlargement of left compared to right submandibular gland with mild tenderness to palpation, no abnormalities in thyroid bed area   Cardiovascular: Normal rate, regular rhythm, normal heart sounds and intact distal pulses. Exam reveals no gallop and no friction rub.   No murmur heard.  Pulmonary/Chest: Effort normal and breath sounds normal. No stridor. No respiratory distress. She has no wheezes. She has no rales. She exhibits no tenderness.   Musculoskeletal: She exhibits no edema or deformity.   Lymphadenopathy:     She has  no cervical adenopathy.   Neurological: She is alert.   Skin: No rash noted. She is not diaphoretic. No erythema. No pallor.   Vitals reviewed.        Lab Review:   Lab Visit on 01/30/2020   Component Date Value    WBC 01/30/2020 5.07     RBC 01/30/2020 3.43*    Hemoglobin 01/30/2020 13.0     Hematocrit 01/30/2020 38.7     Mean Corpuscular Volume 01/30/2020 113*    Mean Corpuscular Hemoglo* 01/30/2020 37.9*    Mean Corpuscular Hemoglo* 01/30/2020 33.6     RDW 01/30/2020 13.1     Platelets 01/30/2020 238     MPV 01/30/2020 10.3     Immature Granulocytes 01/30/2020 2.2*    Gran # (ANC) 01/30/2020 2.7     Immature Grans (Abs) 01/30/2020 0.11*    Lymph # 01/30/2020 1.6     Mono # 01/30/2020 0.6     Eos # 01/30/2020 0.1     Baso # 01/30/2020 0.07     nRBC 01/30/2020 0     Gran% 01/30/2020 52.2     Lymph% 01/30/2020 30.8     Mono% 01/30/2020 11.6     Eosinophil% 01/30/2020 1.8     Basophil% 01/30/2020 1.4     Platelet Estimate 01/30/2020 Appears normal     Aniso 01/30/2020 Slight     Poik 01/30/2020 Slight     Ovalocytes 01/30/2020 Occasional     Differential Method 01/30/2020 Automated     Sodium 01/30/2020 138     Potassium 01/30/2020 4.5     Chloride 01/30/2020 104     CO2 01/30/2020 26     Glucose 01/30/2020 120*    BUN, Bld 01/30/2020 10     Creatinine 01/30/2020 1.3     Calcium 01/30/2020 9.1     Total Protein 01/30/2020 7.1     Albumin 01/30/2020 3.8     Total Bilirubin 01/30/2020 0.4     Alkaline Phosphatase 01/30/2020 61     AST 01/30/2020 25     ALT 01/30/2020 39     Anion Gap 01/30/2020 8     eGFR if African American 01/30/2020 55*    eGFR if non African Amer* 01/30/2020 48*   Lab Visit on 11/05/2019   Component Date Value    TSH 11/05/2019 10.778*    Free T4 11/05/2019 0.91     Thyroglobulin, Tumor Mar* 11/05/2019 6.3*    Thyroglobulin Antibody S* 11/05/2019 <1.8     Thyroglobulin Interpreta* 11/05/2019 SEE BELOW    Lab Visit on 10/23/2019   Component Date  Value    WBC 10/23/2019 4.28     RBC 10/23/2019 3.27*    Hemoglobin 10/23/2019 12.3     Hematocrit 10/23/2019 35.7*    Mean Corpuscular Volume 10/23/2019 109*    Mean Corpuscular Hemoglo* 10/23/2019 37.6*    Mean Corpuscular Hemoglo* 10/23/2019 34.5     RDW 10/23/2019 13.7     Platelets 10/23/2019 187     MPV 10/23/2019 10.4     Immature Granulocytes 10/23/2019 1.4*    Gran # (ANC) 10/23/2019 1.9     Immature Grans (Abs) 10/23/2019 0.06*    Lymph # 10/23/2019 1.7     Mono # 10/23/2019 0.5     Eos # 10/23/2019 0.1     Baso # 10/23/2019 0.06     nRBC 10/23/2019 0     Gran% 10/23/2019 45.1     Lymph% 10/23/2019 39.5     Mono% 10/23/2019 11.4     Eosinophil% 10/23/2019 1.2     Basophil% 10/23/2019 1.4     Differential Method 10/23/2019 Automated     Sodium 10/23/2019 141     Potassium 10/23/2019 4.1     Chloride 10/23/2019 106     CO2 10/23/2019 25     Glucose 10/23/2019 95     BUN, Bld 10/23/2019 8     Creatinine 10/23/2019 1.2     Calcium 10/23/2019 9.1     Total Protein 10/23/2019 6.9     Albumin 10/23/2019 3.7     Total Bilirubin 10/23/2019 0.4     Alkaline Phosphatase 10/23/2019 54*    AST 10/23/2019 19     ALT 10/23/2019 27     Anion Gap 10/23/2019 10     eGFR if African American 10/23/2019 >60     eGFR if non  Amer* 10/23/2019 53*   Lab Visit on 09/26/2019   Component Date Value    Sodium 09/26/2019 142     Potassium 09/26/2019 4.7     Chloride 09/26/2019 105     CO2 09/26/2019 27     Glucose 09/26/2019 102     BUN, Bld 09/26/2019 10     Creatinine 09/26/2019 1.4     Calcium 09/26/2019 9.6     Total Protein 09/26/2019 7.1     Albumin 09/26/2019 3.8     Total Bilirubin 09/26/2019 0.4     Alkaline Phosphatase 09/26/2019 59     AST 09/26/2019 25     ALT 09/26/2019 34     Anion Gap 09/26/2019 10     eGFR if  09/26/2019 51*    eGFR if non African Amer* 09/26/2019 44*    WBC 09/26/2019 4.19     RBC 09/26/2019 3.47*    Hemoglobin  09/26/2019 13.0     Hematocrit 09/26/2019 37.6     Mean Corpuscular Volume 09/26/2019 108*    Mean Corpuscular Hemoglo* 09/26/2019 37.5*    Mean Corpuscular Hemoglo* 09/26/2019 34.6     RDW 09/26/2019 13.7     Platelets 09/26/2019 283     MPV 09/26/2019 10.5     Gran # (ANC) 09/26/2019 2.4     Lymph # 09/26/2019 1.5     Mono # 09/26/2019 0.2*    Eos # 09/26/2019 0.1     Baso # 09/26/2019 0.04     Gran% 09/26/2019 56.8     Lymph% 09/26/2019 36.5     Mono% 09/26/2019 4.5     Eosinophil% 09/26/2019 1.4     Basophil% 09/26/2019 1.0     Differential Method 09/26/2019 Automated    Admission on 08/23/2019, Discharged on 08/24/2019   Component Date Value    WBC 08/23/2019 4.31     RBC 08/23/2019 3.52*    Hemoglobin 08/23/2019 13.1     Hematocrit 08/23/2019 37.8     Mean Corpuscular Volume 08/23/2019 107*    Mean Corpuscular Hemoglo* 08/23/2019 37.2*    Mean Corpuscular Hemoglo* 08/23/2019 34.7     RDW 08/23/2019 13.8     Platelets 08/23/2019 286     MPV 08/23/2019 10.5     Gran # (ANC) 08/23/2019 2.2     Lymph # 08/23/2019 1.7     Mono # 08/23/2019 0.3     Eos # 08/23/2019 0.1     Baso # 08/23/2019 0.03     Gran% 08/23/2019 50.6     Lymph% 08/23/2019 40.1     Mono% 08/23/2019 6.5     Eosinophil% 08/23/2019 2.3     Basophil% 08/23/2019 0.7     Differential Method 08/23/2019 Automated     Sodium 08/23/2019 136     Potassium 08/23/2019 3.8     Chloride 08/23/2019 104     CO2 08/23/2019 22*    Glucose 08/23/2019 98     BUN, Bld 08/23/2019 17     Creatinine 08/23/2019 1.4     Calcium 08/23/2019 9.4     Total Protein 08/23/2019 7.4     Albumin 08/23/2019 3.9     Total Bilirubin 08/23/2019 0.4     Alkaline Phosphatase 08/23/2019 49*    AST 08/23/2019 23     ALT 08/23/2019 25     Anion Gap 08/23/2019 10     eGFR if  08/23/2019 51*    eGFR if non  Amer* 08/23/2019 44*    Specimen UA 08/24/2019 Urine, Clean Catch     Color, UA 08/24/2019 Yellow      Appearance, UA 08/24/2019 Clear     pH, UA 08/24/2019 6.0     Specific Gravity, UA 08/24/2019 >=1.030*    Protein, UA 08/24/2019 Negative     Glucose, UA 08/24/2019 Negative     Ketones, UA 08/24/2019 Negative     Bilirubin (UA) 08/24/2019 Negative     Occult Blood UA 08/24/2019 3+*    Nitrite, UA 08/24/2019 Negative     Urobilinogen, UA 08/24/2019 Negative     Leukocytes, UA 08/24/2019 Trace*    Amylase 08/23/2019 69     Lipase 08/23/2019 41     RBC, UA 08/24/2019 10*    WBC, UA 08/24/2019 30*    Bacteria 08/24/2019 Many*    Squam Epithel, UA 08/24/2019 2     Microscopic Comment 08/24/2019 SEE COMMENT        Assessment:     1. History of thyroid cancer  US Soft Tissue Head Neck Thyroid    CANCELED: US Soft Tissue Head Neck Thyroid   2. Postoperative hypothyroidism     3. Malignant neoplasm of overlapping sites of right breast in female, estrogen receptor positive     4. Submandibular gland swelling  US Soft Tissue Head Neck Thyroid    CANCELED: US Soft Tissue Head Neck Thyroid    I will increase her Synthroid to 175 mcg and until she runs out of the 150 mcg I told her she can take 1 and half tablets and will repeat her labs on February 26 when she gets her imaging studies and will also get a neck ultrasound done to further evaluate the submandibular gland swelling and just a routine follow-up neck ultrasound because of her history of thyroid cancer    Discussed that increasing the Synthroid should help with weight loss as well as I recommended counting her calories and trying not to exceed 1200 calories per day, discussed using apps like my fitness Pal, continue with walking as she has been doing as well  Plan:   History of thyroid cancer  -     Cancel: US Soft Tissue Head Neck Thyroid; Future; Expected date: 02/06/2020  -     US Soft Tissue Head Neck Thyroid; Future; Expected date: 02/06/2020    Postoperative hypothyroidism    Malignant neoplasm of overlapping sites of right breast in female,  estrogen receptor positive    Submandibular gland swelling  -     Cancel: US Soft Tissue Head Neck Thyroid; Future; Expected date: 02/06/2020  -     US Soft Tissue Head Neck Thyroid; Future; Expected date: 02/06/2020          Follow up in about 3 months (around 5/6/2020) for hypothyroidism.    Labs prior to appointment? not applicable     Disclaimer:  This note may have been partially prepared using voice recognition software and  it may have not been extensively proofed, as such there could be errors within the text such as sound alike errors.

## 2020-02-10 NOTE — TELEPHONE ENCOUNTER
LVM with patient to discuss if she would like to fill her letrozole at [facility] or continue filling with [facility].

## 2020-02-10 NOTE — TELEPHONE ENCOUNTER
The patient returned call - she confirmed she does want to fill Femara with [facility] rather than [facility] since [facility] would occasionally be unable to order the medication. It is RTS until 2/26 - confirmed she will need a refill around 3/3. Informed her we will reach out on 2/26 to coordinate delivery. She verbalized understanding and requested we contact her on her home number: [phone].

## 2020-02-26 ENCOUNTER — HOSPITAL ENCOUNTER (OUTPATIENT)
Dept: RADIOLOGY | Facility: HOSPITAL | Age: 50
Discharge: HOME | End: 2020-02-26
Attending: INTERNAL MEDICINE
Payer: MEDICAID

## 2020-02-26 DIAGNOSIS — R60.0 SUBMANDIBULAR GLAND SWELLING: ICD-10-CM

## 2020-02-26 DIAGNOSIS — Z85.850 HISTORY OF THYROID CANCER: ICD-10-CM

## 2020-02-26 DIAGNOSIS — Z17.0 MALIGNANT NEOPLASM OF OVERLAPPING SITES OF RIGHT BREAST IN FEMALE, ESTROGEN RECEPTOR POSITIVE: ICD-10-CM

## 2020-02-26 DIAGNOSIS — C50.811 MALIGNANT NEOPLASM OF OVERLAPPING SITES OF RIGHT BREAST IN FEMALE, ESTROGEN RECEPTOR POSITIVE: ICD-10-CM

## 2020-02-26 PROCEDURE — 76536 US EXAM OF HEAD AND NECK: CPT | Mod: TC

## 2020-02-26 PROCEDURE — 74177 CT ABD & PELVIS W/CONTRAST: CPT | Mod: TC

## 2020-02-26 PROCEDURE — 25500020 PHARM REV CODE 255: Performed by: INTERNAL MEDICINE

## 2020-02-26 RX ADMIN — IOHEXOL 85 ML: 350 INJECTION, SOLUTION INTRAVENOUS at 11:02

## 2020-02-26 RX ADMIN — IOHEXOL 30 ML: 350 INJECTION, SOLUTION INTRAVENOUS at 10:02

## 2020-02-26 NOTE — TELEPHONE ENCOUNTER
Call Attempt 1: Unable to reach patient for Addyara initial consult. Contacted patient on home # as requested. Elizabeth answered the phone and will have patient call us back.

## 2020-02-27 NOTE — TELEPHONE ENCOUNTER
Contacted patient for letrozole initial consult. Patient declined initial consultation and a 7-day touchbase as she is not new to therapy and is very comfortable with the medication. She was previously filling at [facility], but there were times they were unable to order the medication. The patient has about 1 week of therapy on hand. Will ship the medication on 2/27 for the patient to receive on 2/28. $0.00 copay. Address confirmed.     Provided information about the pharmacy (normal business hours, on-call pharmacist for medication related questions or concerns, periodic follow up calls) and shipment ([employer], packaging).    Instructed to store medication in a cool, dry area away from any pets and children.    Educated on proper handling precautions (do not directly touch medication - use disposable gloves or the cap of bottle/vial or dosing cup). In the case of accidental contact, patient was instructed to wash hands immediately and thoroughly to ensure no transfer to others not intended for.    Counseled patient to take Letrozole 2.5 mg - 1 tablet QD around the same time each day.    Labs reviewed from 2/26/2020. CBC & CMP - stable. Renal and hepatic function are stable. No action required. Therapy and dose are appropriate for  Breast cancer, advanced (first- or second-line treatment): Females: Postmenopausal: Oral: 2.5 mg once daily; continue until tumor progression.    Reports no changes in medications, allergies, or health conditions.    Patient has no questions or concerns at this time. She is aware to contact [facility] if she needs anything.

## 2020-02-28 ENCOUNTER — OFFICE VISIT (OUTPATIENT)
Dept: HEMATOLOGY/ONCOLOGY | Facility: CLINIC | Age: 50
End: 2020-02-28
Payer: MEDICAID

## 2020-02-28 VITALS
OXYGEN SATURATION: 97 % | WEIGHT: 229.5 LBS | SYSTOLIC BLOOD PRESSURE: 118 MMHG | DIASTOLIC BLOOD PRESSURE: 72 MMHG | HEART RATE: 101 BPM | BODY MASS INDEX: 35.94 KG/M2 | TEMPERATURE: 98 F

## 2020-02-28 DIAGNOSIS — C77.3 MALIGNANT NEOPLASM METASTATIC TO LYMPH NODE OF AXILLA: ICD-10-CM

## 2020-02-28 DIAGNOSIS — C79.51 SECONDARY CANCER OF BONE: ICD-10-CM

## 2020-02-28 DIAGNOSIS — C73 PAPILLARY THYROID CARCINOMA: ICD-10-CM

## 2020-02-28 DIAGNOSIS — F41.9 ANXIETY: ICD-10-CM

## 2020-02-28 DIAGNOSIS — G89.3 NEOPLASM RELATED PAIN: ICD-10-CM

## 2020-02-28 DIAGNOSIS — F41.1 GENERALIZED ANXIETY DISORDER: ICD-10-CM

## 2020-02-28 DIAGNOSIS — C50.811 MALIGNANT NEOPLASM OF OVERLAPPING SITES OF RIGHT BREAST IN FEMALE, ESTROGEN RECEPTOR POSITIVE: Primary | ICD-10-CM

## 2020-02-28 DIAGNOSIS — F32.A DEPRESSION, UNSPECIFIED DEPRESSION TYPE: ICD-10-CM

## 2020-02-28 DIAGNOSIS — Z17.0 MALIGNANT NEOPLASM OF OVERLAPPING SITES OF RIGHT BREAST IN FEMALE, ESTROGEN RECEPTOR POSITIVE: Primary | ICD-10-CM

## 2020-02-28 PROCEDURE — 99999 PR PBB SHADOW E&M-EST. PATIENT-LVL III: ICD-10-PCS | Mod: PBBFAC,,, | Performed by: INTERNAL MEDICINE

## 2020-02-28 PROCEDURE — 99213 OFFICE O/P EST LOW 20 MIN: CPT | Mod: PBBFAC | Performed by: INTERNAL MEDICINE

## 2020-02-28 PROCEDURE — 99215 OFFICE O/P EST HI 40 MIN: CPT | Mod: S$PBB,,, | Performed by: INTERNAL MEDICINE

## 2020-02-28 PROCEDURE — 99215 PR OFFICE/OUTPT VISIT, EST, LEVL V, 40-54 MIN: ICD-10-PCS | Mod: S$PBB,,, | Performed by: INTERNAL MEDICINE

## 2020-02-28 PROCEDURE — 99999 PR PBB SHADOW E&M-EST. PATIENT-LVL III: CPT | Mod: PBBFAC,,, | Performed by: INTERNAL MEDICINE

## 2020-02-28 NOTE — PROGRESS NOTES
Subjective:      DATE OF VISIT: 2/28/2020   ?   ?   Patient ID:?Josey Flores is a 49 y.o. female.?? MR#: [id number]   ?   PRIMARY PROVIDER: Dr. Asencio  ?   CHIEF COMPLAINT:  Follow-up, discuss results of repeat CT????   ?   ONCOLOGIC DIAGNOSIS:      stage IV, T2 N1b M1, invasive breast carcinoma, ER/AR+, HER2-    Papillary thyroid carcinoma status post total thyroidectomy on 08/31/2017, mpT1b N0    Cervical HSIL MIREILLE 3 s/p LEEP, 2017  ?   CURRENT TREATMENT: palbociclib and letrozole    HPI    Stable right upper quadrant pain of several years.  She continues have poor sleep which she associates with anxiety.    ONCOLOGIC HISTORY:      initial diagnosis after self palpation of mass near right nipple.  Further imaging revealed two lesions in right breast, axillary lymph node.  PET-CT in January 2017 revealed metastatic disease involving 2 lesions in right breast, right axilla, and metastatic disease in manubrium as well as left posterior ilium.  Thyroid was also noted to have avidity.  This was found to be papillary thyroid cancer and she underwent total thyroidectomy.  She notes development of bilateral upper extremity edema and underwent palliative bilateral axillary resection as well as resection of metastatic focus in left ilial region per patient report.     She also has a history of HSIL MIREILLE 3 status post LEEP in 2017.     Review of Systems    ?   A comprehensive 14-point review of systems was reviewed with patient and was negative other than as specified above.   ?     Objective:      Physical Exam      ?   Vitals:    02/28/20 1111   BP: 118/72   Pulse: 101   Temp: 97.7 °F (36.5 °C)      ?   ECOG:?0   General appearance: Generally well appearing, in no acute distress.   Head, eyes, ears, nose, and throat: Oropharynx clear with moist mucous membranes.   Cardiovascular: Regular rate and rhythm, S1, S2, no audible murmurs.   Respiratory: Lungs clear to auscultation bilaterally.   Abdomen: nontender,  nondistended.   Extremities: Warm, without edema.   Neurologic: Alert and oriented. Grossly normal strength, coordination, and gait.   Skin: No rashes, ecchymoses or petechial lesion.   Psychiatric: normal mood and affect, conversant and appropriate    ?   Laboratory:  ?   No visits with results within 1 Day(s) from this visit.   Latest known visit with results is:   Lab Visit on 02/26/2020   Component Date Value Ref Range Status    WBC 02/26/2020 2.81* 3.90 - 12.70 K/uL Final    RBC 02/26/2020 3.29* 4.00 - 5.40 M/uL Final    Hemoglobin 02/26/2020 12.4  12.0 - 16.0 g/dL Final    Hematocrit 02/26/2020 36.9* 37.0 - 48.5 % Final    Mean Corpuscular Volume 02/26/2020 112* 82 - 98 fL Final    Mean Corpuscular Hemoglobin 02/26/2020 37.7* 27.0 - 31.0 pg Final    Mean Corpuscular Hemoglobin Conc 02/26/2020 33.6  32.0 - 36.0 g/dL Final    RDW 02/26/2020 13.2  11.5 - 14.5 % Final    Platelets 02/26/2020 211  150 - 350 K/uL Final    MPV 02/26/2020 10.0  9.2 - 12.9 fL Final    Immature Granulocytes 02/26/2020 0.4  0.0 - 0.5 % Final    Gran # (ANC) 02/26/2020 1.5* 1.8 - 7.7 K/uL Final    Immature Grans (Abs) 02/26/2020 0.01  0.00 - 0.04 K/uL Final    Lymph # 02/26/2020 1.0  1.0 - 4.8 K/uL Final    Mono # 02/26/2020 0.2* 0.3 - 1.0 K/uL Final    Eos # 02/26/2020 0.1  0.0 - 0.5 K/uL Final    Baso # 02/26/2020 0.04  0.00 - 0.20 K/uL Final    nRBC 02/26/2020 0  0 /100 WBC Final    Gran% 02/26/2020 52.3  38.0 - 73.0 % Final    Lymph% 02/26/2020 35.0  18.0 - 48.0 % Final    Mono% 02/26/2020 8.7  4.0 - 15.0 % Final    Eosinophil% 02/26/2020 2.2  0.0 - 8.0 % Final    Basophil% 02/26/2020 1.4  0.0 - 1.9 % Final    Differential Method 02/26/2020 Automated   Final    Sodium 02/26/2020 139  136 - 145 mmol/L Final    Potassium 02/26/2020 4.0  3.5 - 5.1 mmol/L Final    Chloride 02/26/2020 105  95 - 110 mmol/L Final    CO2 02/26/2020 25  23 - 29 mmol/L Final    Glucose 02/26/2020 114* 70 - 110 mg/dL Final    BUN,  Bld 02/26/2020 8  6 - 20 mg/dL Final    Creatinine 02/26/2020 1.4  0.5 - 1.4 mg/dL Final    Calcium 02/26/2020 9.3  8.7 - 10.5 mg/dL Final    Total Protein 02/26/2020 7.1  6.0 - 8.4 g/dL Final    Albumin 02/26/2020 3.8  3.5 - 5.2 g/dL Final    Total Bilirubin 02/26/2020 0.4  0.1 - 1.0 mg/dL Final    Alkaline Phosphatase 02/26/2020 57  55 - 135 U/L Final    AST 02/26/2020 17  10 - 40 U/L Final    ALT 02/26/2020 23  10 - 44 U/L Final    Anion Gap 02/26/2020 9  8 - 16 mmol/L Final    eGFR if African American 02/26/2020 50* >60 mL/min/1.73 m^2 Final    eGFR if non African American 02/26/2020 44* >60 mL/min/1.73 m^2 Final    Creatinine 02/26/2020 1.4  0.5 - 1.4 mg/dL Final    eGFR if African American 02/26/2020 50* >60 mL/min/1.73 m^2 Final    eGFR if non African American 02/26/2020 44* >60 mL/min/1.73 m^2 Final    Free T4 02/26/2020 1.31  0.71 - 1.51 ng/dL Final    Thyroglobulin, Tumor Marker 02/26/2020 0.7* ng/mL Final    Thyroglobulin Antibody Screen 02/26/2020 <1.8  <4.0 IU/mL Final    Thyroglobulin Interpretation 02/26/2020 SEE BELOW   Final    TSH 02/26/2020 1.432  0.400 - 4.000 uIU/mL Final    Hemoglobin A1C 02/26/2020 5.6  4.0 - 5.6 % Final    Estimated Avg Glucose 02/26/2020 114  68 - 131 mg/dL Final    Glucose 02/26/2020 114* 70 - 110 mg/dL Final    Insulin 02/26/2020 100.8* <25.0 uU/mL Final    Insulin Collection Interval 02/26/2020 1   Final      ? IMAGING2/26/20  CT CHEST ABDOMEN PELVIS WITH CONTRAST (XPD)    CLINICAL HISTORY:  Malignant neoplasm of overlapping sites of right female breastrestaging metastatic breast cancer;    FINDINGS:  CT of the chest with contrast.    Images through the chest reveal no detrimental change.  No mediastinal mass or bulky adenopathy.  Normal heart size.  Calcified nodes again identified.  No hilar mass or enlargement and no axillary abnormality is seen.  The lungs again show emphysematous change.  Mild atelectatic changes noted.  No suspicious nodule  or mass.  Small nodule right upper lobe is again seen measuring 5 mm in diameter, unchanged..  Bony windows show no acute abnormality.  No detrimental change.  No suspicious lytic or blastic change.  Cystic change again noted within the sternum.    CT of the abdomen and pelvis with contrast.    Bony windows show no detrimental change.  Degenerative changes are noted.  Lytic lesions within the iliacs appear grossly stable.  There is no free intraperitoneal air within the abdomen or pelvis.  No bowel obstruction.    The liver again shows low-attenuation change consistent with fatty change.  No definite focal lesion is seen.  Gallbladder has been removed.  There is no adrenal or pancreatic mass or enlargement.  No solid renal mass or obstruction.  No ascites or adenopathy.  Moderate volume of stool in the colon.  Borderline colonic wall thickening of the rectosigmoid possibly related to incomplete distention.  No abnormality to suggest acute appendicitis.      Impression       1.  No detrimental change in the appearance of the CT abdomen, pelvis, and chest since September 2019.    2.  Fatty liver.  Emphysematous change.  Bony changes are static.     US SOFT TISSUE HEAD NECK THYROID    CLINICAL HISTORY:  Personal history of malignant neoplasm of thyroidPatient with swelling of left submandibular gland with tenderness and also needs neck evaluation for pathologic lesions as she has a history of thyroid cancer status post thyroidectomy;    FINDINGS:  Patient is status post thyroidectomy.  Correlated with prior study from 2019.    The thyroid bed is unremarkable.  No mass.  No unusual fluid collection.    There is a small cervical node on the left measuring 6 mm in maximum diameter.  In the submandibular region on the left there is a node measuring 25 by is a 7 x 10 mm in diameter.  In the right cervical area there is are nodes measuring 7 mm and 6 mm in greatest length.      Impression       1.  There is no residual mass  or thyroid tissue within the region of the thyroid bed.    2.  Lymph nodes bilaterally as described, most of which are less than a cm in diameter and considered normal.  There is 1 enlarged node in the region of the left submandibular gland as described.  Significance is indeterminate.       ?   Assessment/Plan:       1. Malignant neoplasm of overlapping sites of right breast in female, estrogen receptor positive    2. Anxiety    3. Depression, unspecified depression type    4. Generalized anxiety disorder    5. Malignant neoplasm metastatic to lymph node of axilla    6. Papillary thyroid carcinoma    7. Neoplasm related pain    8. Secondary cancer of bone          Plan:     # metastatic breast:  Continues on systemic therapy with palbociclib and letrozole, tolerating well.  Last staging imaging on 02/24/2020 I have reviewed and demonstrate stable disease over the last several months.  We discussed plan for restaging exams q.3-6 months unless clinical concerns, and will plan to get in 9/2020.  Today she notes chronic pain issues including right upper quadrant pain for several years which has been imaged several times and again without clear etiology.       # Insomnia, anxiety:  She has trialed multiple medications for sleep without improvement.  She feels that underlying this issue is anxiety and depressive disorder association with her oncologic diagnosis.  She would like to meet with palliative care and I have placed a referral today     # bony metastatic disease:  Continues on prophylactic denosumab, calcium and vitamin-D supplementation, given 1/30/20, [id number] next due 4/30/20    # tobacco use:  Counseled on importance of cessation, continues to smoke.    # HSIL:  Status post LEEP 2017, encouraged follow-up with her gynecologist for surveillance, no current acute issues/vaginal bleeding.    # history of papillary thyroid carcinoma status post total thyroidectomy on 08/31/2017: s/p total thyroidectomy on  levothyroxine.  Metastatic breast cancer restaging imaging without findings for locally recurrent disease.  Follows with Dr. Jason in endocrinology last seen 11/2019 with repeated TSH and thyroglobulin newly elevated.  Recent ultrasound without residual mass or thyroid tissue in bed.  Normal lymph nodes bilaterally subcentimeter, 1 enlarged since left submandibular gland unclear significance.  Continue to follow with Endocrinology.    Follow-Up:   - palliative care referral  - handicap form complete  - RV in 1 month with labs

## 2020-03-02 ENCOUNTER — TELEPHONE (OUTPATIENT)
Dept: HEMATOLOGY/ONCOLOGY | Facility: CLINIC | Age: 50
End: 2020-03-02

## 2020-03-02 ENCOUNTER — PATIENT MESSAGE (OUTPATIENT)
Dept: HEMATOLOGY/ONCOLOGY | Facility: CLINIC | Age: 50
End: 2020-03-02

## 2020-03-02 DIAGNOSIS — F41.9 ANXIETY: ICD-10-CM

## 2020-03-02 RX ORDER — ALPRAZOLAM 0.5 MG/1
0.5 TABLET ORAL 2 TIMES DAILY PRN
Qty: 60 TABLET | Refills: 0 | Status: SHIPPED | OUTPATIENT
Start: 2020-03-02 | End: 2020-04-03 | Stop reason: SDUPTHER

## 2020-03-02 NOTE — TELEPHONE ENCOUNTER
SW contacted pt to discuss DS of 4. Pt stated that she didn't have any immediate needs at this time. SW inquired on boost from [facility]. Pt explained that she is currently taking ibrance for treatment and the boost increased her weight gain so she was instructed to not take any more boost. DAYANNA briefly listed social work services from the checklist and informed pt that she would send the list through patient portal for further review by the patient.Pt thanked DAYANNA for the contact card.     F/u as needs arise.

## 2020-03-06 DIAGNOSIS — Z17.0 MALIGNANT NEOPLASM OF OVERLAPPING SITES OF RIGHT BREAST IN FEMALE, ESTROGEN RECEPTOR POSITIVE: ICD-10-CM

## 2020-03-06 DIAGNOSIS — G89.3 NEOPLASM RELATED PAIN: ICD-10-CM

## 2020-03-06 DIAGNOSIS — C50.811 MALIGNANT NEOPLASM OF OVERLAPPING SITES OF RIGHT BREAST IN FEMALE, ESTROGEN RECEPTOR POSITIVE: ICD-10-CM

## 2020-03-06 DIAGNOSIS — C77.3 MALIGNANT NEOPLASM METASTATIC TO LYMPH NODE OF AXILLA: ICD-10-CM

## 2020-03-06 DIAGNOSIS — C79.51 SECONDARY CANCER OF BONE: ICD-10-CM

## 2020-03-06 RX ORDER — HYDROCODONE BITARTRATE AND ACETAMINOPHEN 7.5; 325 MG/1; MG/1
1 TABLET ORAL EVERY 6 HOURS PRN
Qty: 120 TABLET | Refills: 0 | Status: SHIPPED | OUTPATIENT
Start: 2020-03-06 | End: 2020-04-06 | Stop reason: SDUPTHER

## 2020-03-17 ENCOUNTER — PATIENT MESSAGE (OUTPATIENT)
Dept: ENDOCRINOLOGY | Facility: CLINIC | Age: 50
End: 2020-03-17

## 2020-03-27 ENCOUNTER — TELEPHONE (OUTPATIENT)
Dept: PHARMACY | Facility: CLINIC | Age: 50
End: 2020-03-27

## 2020-03-27 DIAGNOSIS — C77.3 MALIGNANT NEOPLASM METASTATIC TO LYMPH NODE OF AXILLA: Primary | ICD-10-CM

## 2020-04-03 DIAGNOSIS — F41.9 ANXIETY: ICD-10-CM

## 2020-04-03 RX ORDER — ALPRAZOLAM 0.5 MG/1
0.5 TABLET ORAL 2 TIMES DAILY PRN
Qty: 60 TABLET | Refills: 0 | Status: SHIPPED | OUTPATIENT
Start: 2020-04-06 | End: 2020-04-30 | Stop reason: SDUPTHER

## 2020-04-06 DIAGNOSIS — C77.3 MALIGNANT NEOPLASM METASTATIC TO LYMPH NODE OF AXILLA: ICD-10-CM

## 2020-04-06 DIAGNOSIS — C79.51 SECONDARY CANCER OF BONE: ICD-10-CM

## 2020-04-06 DIAGNOSIS — C50.811 MALIGNANT NEOPLASM OF OVERLAPPING SITES OF RIGHT BREAST IN FEMALE, ESTROGEN RECEPTOR POSITIVE: ICD-10-CM

## 2020-04-06 DIAGNOSIS — G89.3 NEOPLASM RELATED PAIN: ICD-10-CM

## 2020-04-06 DIAGNOSIS — Z17.0 MALIGNANT NEOPLASM OF OVERLAPPING SITES OF RIGHT BREAST IN FEMALE, ESTROGEN RECEPTOR POSITIVE: ICD-10-CM

## 2020-04-06 RX ORDER — HYDROCODONE BITARTRATE AND ACETAMINOPHEN 7.5; 325 MG/1; MG/1
1 TABLET ORAL EVERY 6 HOURS PRN
Qty: 120 TABLET | Refills: 0 | Status: SHIPPED | OUTPATIENT
Start: 2020-04-06 | End: 2020-05-20

## 2020-04-20 ENCOUNTER — TELEPHONE (OUTPATIENT)
Dept: PHARMACY | Facility: CLINIC | Age: 50
End: 2020-04-20

## 2020-04-20 DIAGNOSIS — Z03.818 ENCOUNTER FOR OBSERVATION FOR SUSPECTED EXPOSURE TO OTHER BIOLOGICAL AGENTS RULED OUT: Primary | ICD-10-CM

## 2020-04-20 NOTE — TELEPHONE ENCOUNTER
Refill call regarding Letrozole at [facility]. Will prepare for shipment with consent of patient on  to arrive . Copay 0.00. Patient has not started any new medications including OTC drugs. Patient has not had any medication/ dose or instruction changes. No new allergies or side effects reported with this shipment. Medication is being taken as prescribed by physician and properly stored. Two patient identifiers:  and Address verified. Patient has 7 days on hand.

## 2020-04-21 ENCOUNTER — CLINICAL SUPPORT (OUTPATIENT)
Dept: OTOLARYNGOLOGY | Facility: CLINIC | Age: 50
End: 2020-04-21
Payer: MEDICAID

## 2020-04-21 DIAGNOSIS — Z03.818 ENCOUNTER FOR OBSERVATION FOR SUSPECTED EXPOSURE TO OTHER BIOLOGICAL AGENTS RULED OUT: ICD-10-CM

## 2020-04-21 PROCEDURE — U0002 COVID-19 LAB TEST NON-CDC: HCPCS

## 2020-04-21 PROCEDURE — 99999 PR PBB SHADOW E&M-EST. PATIENT-LVL II: ICD-10-PCS | Mod: PBBFAC,,,

## 2020-04-21 PROCEDURE — 99999 PR PBB SHADOW E&M-EST. PATIENT-LVL II: CPT | Mod: PBBFAC,,,

## 2020-04-21 PROCEDURE — 99212 OFFICE O/P EST SF 10 MIN: CPT | Mod: PBBFAC

## 2020-04-22 ENCOUNTER — INFUSION (OUTPATIENT)
Dept: INFUSION THERAPY | Facility: HOSPITAL | Age: 50
End: 2020-04-22
Attending: INTERNAL MEDICINE
Payer: MEDICAID

## 2020-04-22 ENCOUNTER — LAB VISIT (OUTPATIENT)
Dept: LAB | Facility: HOSPITAL | Age: 50
End: 2020-04-22
Attending: INTERNAL MEDICINE
Payer: MEDICAID

## 2020-04-22 ENCOUNTER — OFFICE VISIT (OUTPATIENT)
Dept: HEMATOLOGY/ONCOLOGY | Facility: CLINIC | Age: 50
End: 2020-04-22
Payer: MEDICAID

## 2020-04-22 VITALS
HEIGHT: 67 IN | HEART RATE: 99 BPM | BODY MASS INDEX: 36.57 KG/M2 | OXYGEN SATURATION: 98 % | DIASTOLIC BLOOD PRESSURE: 82 MMHG | SYSTOLIC BLOOD PRESSURE: 134 MMHG | TEMPERATURE: 98 F | WEIGHT: 233 LBS

## 2020-04-22 DIAGNOSIS — C77.3 MALIGNANT NEOPLASM METASTATIC TO LYMPH NODE OF AXILLA: ICD-10-CM

## 2020-04-22 DIAGNOSIS — C50.811 MALIGNANT NEOPLASM OF OVERLAPPING SITES OF RIGHT BREAST IN FEMALE, ESTROGEN RECEPTOR POSITIVE: ICD-10-CM

## 2020-04-22 DIAGNOSIS — Z71.6 TOBACCO ABUSE COUNSELING: ICD-10-CM

## 2020-04-22 DIAGNOSIS — C79.51 SECONDARY CANCER OF BONE: Primary | ICD-10-CM

## 2020-04-22 DIAGNOSIS — Z17.0 MALIGNANT NEOPLASM OF OVERLAPPING SITES OF RIGHT BREAST IN FEMALE, ESTROGEN RECEPTOR POSITIVE: ICD-10-CM

## 2020-04-22 LAB
ALBUMIN SERPL BCP-MCNC: 3.9 G/DL (ref 3.5–5.2)
ALP SERPL-CCNC: 63 U/L (ref 55–135)
ALT SERPL W/O P-5'-P-CCNC: 23 U/L (ref 10–44)
ANION GAP SERPL CALC-SCNC: 10 MMOL/L (ref 8–16)
ANISOCYTOSIS BLD QL SMEAR: SLIGHT
AST SERPL-CCNC: 16 U/L (ref 10–40)
BASOPHILS # BLD AUTO: 0.07 K/UL (ref 0–0.2)
BASOPHILS NFR BLD: 1.4 % (ref 0–1.9)
BILIRUB SERPL-MCNC: 0.4 MG/DL (ref 0.1–1)
BUN SERPL-MCNC: 10 MG/DL (ref 6–20)
CALCIUM SERPL-MCNC: 9.1 MG/DL (ref 8.7–10.5)
CHLORIDE SERPL-SCNC: 103 MMOL/L (ref 95–110)
CO2 SERPL-SCNC: 26 MMOL/L (ref 23–29)
CREAT SERPL-MCNC: 1.5 MG/DL (ref 0.5–1.4)
DACRYOCYTES BLD QL SMEAR: ABNORMAL
DIFFERENTIAL METHOD: ABNORMAL
EOSINOPHIL # BLD AUTO: 0.1 K/UL (ref 0–0.5)
EOSINOPHIL NFR BLD: 1.9 % (ref 0–8)
ERYTHROCYTE [DISTWIDTH] IN BLOOD BY AUTOMATED COUNT: 13.5 % (ref 11.5–14.5)
EST. GFR  (AFRICAN AMERICAN): 46 ML/MIN/1.73 M^2
EST. GFR  (NON AFRICAN AMERICAN): 40 ML/MIN/1.73 M^2
GLUCOSE SERPL-MCNC: 121 MG/DL (ref 70–110)
HCT VFR BLD AUTO: 37.4 % (ref 37–48.5)
HGB BLD-MCNC: 13 G/DL (ref 12–16)
IMM GRANULOCYTES # BLD AUTO: 0.05 K/UL (ref 0–0.04)
IMM GRANULOCYTES NFR BLD AUTO: 1 % (ref 0–0.5)
LYMPHOCYTES # BLD AUTO: 1.4 K/UL (ref 1–4.8)
LYMPHOCYTES NFR BLD: 28.6 % (ref 18–48)
MCH RBC QN AUTO: 38.3 PG (ref 27–31)
MCHC RBC AUTO-ENTMCNC: 34.8 G/DL (ref 32–36)
MCV RBC AUTO: 110 FL (ref 82–98)
MONOCYTES # BLD AUTO: 0.3 K/UL (ref 0.3–1)
MONOCYTES NFR BLD: 6.2 % (ref 4–15)
NEUTROPHILS # BLD AUTO: 3 K/UL (ref 1.8–7.7)
NEUTROPHILS NFR BLD: 60.9 % (ref 38–73)
NRBC BLD-RTO: 0 /100 WBC
OVALOCYTES BLD QL SMEAR: ABNORMAL
PLATELET # BLD AUTO: 247 K/UL (ref 150–350)
PLATELET BLD QL SMEAR: ABNORMAL
PMV BLD AUTO: 10.6 FL (ref 9.2–12.9)
POIKILOCYTOSIS BLD QL SMEAR: SLIGHT
POTASSIUM SERPL-SCNC: 4.5 MMOL/L (ref 3.5–5.1)
PROT SERPL-MCNC: 7.4 G/DL (ref 6–8.4)
RBC # BLD AUTO: 3.39 M/UL (ref 4–5.4)
SARS-COV-2 RNA RESP QL NAA+PROBE: NOT DETECTED
SODIUM SERPL-SCNC: 139 MMOL/L (ref 136–145)
STOMATOCYTES BLD QL SMEAR: PRESENT
WBC # BLD AUTO: 4.86 K/UL (ref 3.9–12.7)

## 2020-04-22 PROCEDURE — 99215 OFFICE O/P EST HI 40 MIN: CPT | Mod: S$PBB,,, | Performed by: INTERNAL MEDICINE

## 2020-04-22 PROCEDURE — 36415 COLL VENOUS BLD VENIPUNCTURE: CPT

## 2020-04-22 PROCEDURE — 99213 OFFICE O/P EST LOW 20 MIN: CPT | Mod: PBBFAC,25 | Performed by: INTERNAL MEDICINE

## 2020-04-22 PROCEDURE — 96372 THER/PROPH/DIAG INJ SC/IM: CPT

## 2020-04-22 PROCEDURE — 99215 PR OFFICE/OUTPT VISIT, EST, LEVL V, 40-54 MIN: ICD-10-PCS | Mod: S$PBB,,, | Performed by: INTERNAL MEDICINE

## 2020-04-22 PROCEDURE — 85025 COMPLETE CBC W/AUTO DIFF WBC: CPT

## 2020-04-22 PROCEDURE — 63600175 PHARM REV CODE 636 W HCPCS: Mod: JG | Performed by: INTERNAL MEDICINE

## 2020-04-22 PROCEDURE — 99999 PR PBB SHADOW E&M-EST. PATIENT-LVL III: ICD-10-PCS | Mod: PBBFAC,,, | Performed by: INTERNAL MEDICINE

## 2020-04-22 PROCEDURE — 80053 COMPREHEN METABOLIC PANEL: CPT

## 2020-04-22 PROCEDURE — 99999 PR PBB SHADOW E&M-EST. PATIENT-LVL III: CPT | Mod: PBBFAC,,, | Performed by: INTERNAL MEDICINE

## 2020-04-22 RX ADMIN — DENOSUMAB 120 MG: 120 INJECTION SUBCUTANEOUS at 01:04

## 2020-04-22 NOTE — PROGRESS NOTES
Subjective:      DATE OF VISIT: 4/22/2020   ?   ?   Patient ID:?Josey Flores is a 49 y.o. female.?? MR#: [id number]   ?   PRIMARY PROVIDER: Dr. Asencio  ?   CHIEF COMPLAINT:  Follow-up, discuss results of repeat CT????   ?   ONCOLOGIC DIAGNOSIS:      stage IV, T2 N1b M1, invasive breast carcinoma, ER/SC+, HER2-    Papillary thyroid carcinoma status post total thyroidectomy on 08/31/2017, mpT1b N0    Cervical HSIL MIREILLE 3 s/p LEEP, 2017  ?   CURRENT TREATMENT: palbociclib and letrozole    HPI    She has been doing well staying at home with COVID-19.  No new issues or concerns.  She notes her pain comes and goes requiring Norco about every 4 hr but well controlled on this regimen.  She has not met with palliative Care.     ONCOLOGIC HISTORY:      initial diagnosis after self palpation of mass near right nipple.  Further imaging revealed two lesions in right breast, axillary lymph node.  PET-CT in January 2017 revealed metastatic disease involving 2 lesions in right breast, right axilla, and metastatic disease in manubrium as well as left posterior ilium.  Thyroid was also noted to have avidity.  This was found to be papillary thyroid cancer and she underwent total thyroidectomy.  She notes development of bilateral upper extremity edema and underwent palliative bilateral axillary resection as well as resection of metastatic focus in left ilial region per patient report.     She also has a history of HSIL MIREILLE 3 status post LEEP in 2017.     Review of Systems    ?   A comprehensive 14-point review of systems was reviewed with patient and was negative other than as specified above.   ?     Objective:      Physical Exam      ?   Vitals:    04/22/20 1303   BP: 134/82   Pulse: 99   Temp: 98.4 °F (36.9 °C)      ECOG:?0   General appearance: Generally well appearing, in no acute distress.   Head, eyes, ears, nose, and throat: moist mucous membranes.   Respiratory:  Normal work of breathing  Abdomen: nontender, nondistended.    Extremities: Warm, without edema.   Neurologic: Alert and oriented. Grossly normal strength, coordination, and gait.   Skin: No rashes, ecchymoses or petechial lesion.   Psychiatric:  Normal mood and affect.    ?   Laboratory:  ?   Lab Visit on 04/22/2020   Component Date Value Ref Range Status    Sodium 04/22/2020 139  136 - 145 mmol/L Final    Potassium 04/22/2020 4.5  3.5 - 5.1 mmol/L Final    Chloride 04/22/2020 103  95 - 110 mmol/L Final    CO2 04/22/2020 26  23 - 29 mmol/L Final    Glucose 04/22/2020 121* 70 - 110 mg/dL Final    BUN, Bld 04/22/2020 10  6 - 20 mg/dL Final    Creatinine 04/22/2020 1.5* 0.5 - 1.4 mg/dL Final    Calcium 04/22/2020 9.1  8.7 - 10.5 mg/dL Final    Total Protein 04/22/2020 7.4  6.0 - 8.4 g/dL Final    Albumin 04/22/2020 3.9  3.5 - 5.2 g/dL Final    Total Bilirubin 04/22/2020 0.4  0.1 - 1.0 mg/dL Final    Alkaline Phosphatase 04/22/2020 63  55 - 135 U/L Final    AST 04/22/2020 16  10 - 40 U/L Final    ALT 04/22/2020 23  10 - 44 U/L Final    Anion Gap 04/22/2020 10  8 - 16 mmol/L Final    eGFR if  04/22/2020 46* >60 mL/min/1.73 m^2 Final    eGFR if non African American 04/22/2020 40* >60 mL/min/1.73 m^2 Final    WBC 04/22/2020 4.86  3.90 - 12.70 K/uL Final    RBC 04/22/2020 3.39* 4.00 - 5.40 M/uL Final    Hemoglobin 04/22/2020 13.0  12.0 - 16.0 g/dL Final    Hematocrit 04/22/2020 37.4  37.0 - 48.5 % Final    Mean Corpuscular Volume 04/22/2020 110* 82 - 98 fL Final    Mean Corpuscular Hemoglobin 04/22/2020 38.3* 27.0 - 31.0 pg Final    Mean Corpuscular Hemoglobin Conc 04/22/2020 34.8  32.0 - 36.0 g/dL Final    RDW 04/22/2020 13.5  11.5 - 14.5 % Final    Platelets 04/22/2020 247  150 - 350 K/uL Final    MPV 04/22/2020 10.6  9.2 - 12.9 fL Final    Immature Granulocytes 04/22/2020 1.0* 0.0 - 0.5 % Final    Gran # (ANC) 04/22/2020 3.0  1.8 - 7.7 K/uL Final    Immature Grans (Abs) 04/22/2020 0.05* 0.00 - 0.04 K/uL Final    Lymph # 04/22/2020  1.4  1.0 - 4.8 K/uL Final    Mono # 04/22/2020 0.3  0.3 - 1.0 K/uL Final    Eos # 04/22/2020 0.1  0.0 - 0.5 K/uL Final    Baso # 04/22/2020 0.07  0.00 - 0.20 K/uL Final    nRBC 04/22/2020 0  0 /100 WBC Final    Gran% 04/22/2020 60.9  38.0 - 73.0 % Final    Lymph% 04/22/2020 28.6  18.0 - 48.0 % Final    Mono% 04/22/2020 6.2  4.0 - 15.0 % Final    Eosinophil% 04/22/2020 1.9  0.0 - 8.0 % Final    Basophil% 04/22/2020 1.4  0.0 - 1.9 % Final    Platelet Estimate 04/22/2020 Appears normal   Final    Aniso 04/22/2020 Slight   Final    Poik 04/22/2020 Slight   Final    Ovalocytes 04/22/2020 Occasional   Final    Tear Drop Cells 04/22/2020 Occasional   Final    Stomatocytes 04/22/2020 Present   Final    Differential Method 04/22/2020 Automated   Final      ? IMAGING      2/26/20  CT CHEST ABDOMEN PELVIS WITH CONTRAST (XPD)    CLINICAL HISTORY:  Malignant neoplasm of overlapping sites of right female breastrestaging metastatic breast cancer;    FINDINGS:  CT of the chest with contrast.    Images through the chest reveal no detrimental change.  No mediastinal mass or bulky adenopathy.  Normal heart size.  Calcified nodes again identified.  No hilar mass or enlargement and no axillary abnormality is seen.  The lungs again show emphysematous change.  Mild atelectatic changes noted.  No suspicious nodule or mass.  Small nodule right upper lobe is again seen measuring 5 mm in diameter, unchanged..  Bony windows show no acute abnormality.  No detrimental change.  No suspicious lytic or blastic change.  Cystic change again noted within the sternum.    CT of the abdomen and pelvis with contrast.    Bony windows show no detrimental change.  Degenerative changes are noted.  Lytic lesions within the iliacs appear grossly stable.  There is no free intraperitoneal air within the abdomen or pelvis.  No bowel obstruction.    The liver again shows low-attenuation change consistent with fatty change.  No definite focal lesion  is seen.  Gallbladder has been removed.  There is no adrenal or pancreatic mass or enlargement.  No solid renal mass or obstruction.  No ascites or adenopathy.  Moderate volume of stool in the colon.  Borderline colonic wall thickening of the rectosigmoid possibly related to incomplete distention.  No abnormality to suggest acute appendicitis.      Impression       1.  No detrimental change in the appearance of the CT abdomen, pelvis, and chest since September 2019.    2.  Fatty liver.  Emphysematous change.  Bony changes are static.     US SOFT TISSUE HEAD NECK THYROID    CLINICAL HISTORY:  Personal history of malignant neoplasm of thyroidPatient with swelling of left submandibular gland with tenderness and also needs neck evaluation for pathologic lesions as she has a history of thyroid cancer status post thyroidectomy;    FINDINGS:  Patient is status post thyroidectomy.  Correlated with prior study from 2019.    The thyroid bed is unremarkable.  No mass.  No unusual fluid collection.    There is a small cervical node on the left measuring 6 mm in maximum diameter.  In the submandibular region on the left there is a node measuring 25 by is a 7 x 10 mm in diameter.  In the right cervical area there is are nodes measuring 7 mm and 6 mm in greatest length.      Impression       1.  There is no residual mass or thyroid tissue within the region of the thyroid bed.    2.  Lymph nodes bilaterally as described, most of which are less than a cm in diameter and considered normal.  There is 1 enlarged node in the region of the left submandibular gland as described.  Significance is indeterminate.       ?   Assessment/Plan:       1. Secondary cancer of bone    2. Malignant neoplasm metastatic to lymph node of axilla    3. Malignant neoplasm of overlapping sites of right breast in female, estrogen receptor positive    4. Tobacco abuse counseling          Plan:     # metastatic breast:  Continued on systemic therapy with  palbociclib and letrozole, tolerating well.  No new clinical symptoms or concerns, new pain or blood count abnormalities.  Previously discussed plan for restaging exams in 3-6 months from prior and will plan around August 2020.     # Chronic cancer related pain:  Comes and goes, well controlled on Norco every 4 hr.  Referral to primary care due to metastatic disease and to discuss pain management.     # bony metastatic disease:  Continues on prophylactic denosumab, calcium and vitamin-D supplementation, given 1/30/20, [id number], will get next dose today    # tobacco use:  Counseled again for least 5 min on importance of cessation, continues to smoke.  She has trialed nicotine patch and venlafaxine without improvement in discontinued both    # HSIL:  Status post LEEP 2017, encouraged follow-up with her gynecologist for surveillance, no current acute issues/vaginal bleeding.    # history of papillary thyroid carcinoma status post total thyroidectomy on 08/31/2017: s/p total thyroidectomy on levothyroxine.  Metastatic breast cancer restaging imaging without findings for locally recurrent disease.  Follows with Dr. Jason in endocrinology last seen 11/2019 with repeated TSH and thyroglobulin newly elevated.  Recent ultrasound without residual mass or thyroid tissue in bed.  Normal lymph nodes bilaterally subcentimeter, 1 enlarged since left submandibular gland unclear significance.  Continue to follow with Endocrinology.    Follow-Up:   - palliative care referral  - RV in 1 month with labs

## 2020-04-22 NOTE — Clinical Note
Palliative care referral was placed during last visit in February but seems this was never scheduled.  Can you please arrange this for her?

## 2020-04-30 ENCOUNTER — OFFICE VISIT (OUTPATIENT)
Dept: PALLIATIVE MEDICINE | Facility: CLINIC | Age: 50
End: 2020-04-30
Payer: MEDICAID

## 2020-04-30 DIAGNOSIS — Z17.0 MALIGNANT NEOPLASM OF OVERLAPPING SITES OF RIGHT BREAST IN FEMALE, ESTROGEN RECEPTOR POSITIVE: ICD-10-CM

## 2020-04-30 DIAGNOSIS — Z71.89 ACP (ADVANCE CARE PLANNING): ICD-10-CM

## 2020-04-30 DIAGNOSIS — F41.9 ANXIETY: ICD-10-CM

## 2020-04-30 DIAGNOSIS — G89.3 CANCER ASSOCIATED PAIN: Primary | ICD-10-CM

## 2020-04-30 DIAGNOSIS — C50.811 MALIGNANT NEOPLASM OF OVERLAPPING SITES OF RIGHT BREAST IN FEMALE, ESTROGEN RECEPTOR POSITIVE: ICD-10-CM

## 2020-04-30 DIAGNOSIS — C77.3 MALIGNANT NEOPLASM METASTATIC TO LYMPH NODE OF AXILLA: ICD-10-CM

## 2020-04-30 PROCEDURE — 99497 ADVNCD CARE PLAN 30 MIN: CPT | Mod: 95,25,, | Performed by: FAMILY MEDICINE

## 2020-04-30 PROCEDURE — 99204 PR OFFICE/OUTPT VISIT, NEW, LEVL IV, 45-59 MIN: ICD-10-PCS | Mod: 95,,, | Performed by: FAMILY MEDICINE

## 2020-04-30 PROCEDURE — 99497 PR ADVNCD CARE PLAN 30 MIN: ICD-10-PCS | Mod: 95,25,, | Performed by: FAMILY MEDICINE

## 2020-04-30 PROCEDURE — 99204 OFFICE O/P NEW MOD 45 MIN: CPT | Mod: 95,,, | Performed by: FAMILY MEDICINE

## 2020-04-30 RX ORDER — HYDROCODONE BITARTRATE AND ACETAMINOPHEN 10; 325 MG/1; MG/1
1 TABLET ORAL EVERY 4 HOURS PRN
Qty: 60 TABLET | Refills: 0 | Status: SHIPPED | OUTPATIENT
Start: 2020-04-30 | End: 2020-05-28 | Stop reason: SDUPTHER

## 2020-04-30 RX ORDER — MORPHINE SULFATE 15 MG/1
15 TABLET, FILM COATED, EXTENDED RELEASE ORAL EVERY 12 HOURS
Qty: 60 TABLET | Refills: 0 | Status: SHIPPED | OUTPATIENT
Start: 2020-04-30 | End: 2020-05-28 | Stop reason: SDUPTHER

## 2020-04-30 RX ORDER — ALPRAZOLAM 0.5 MG/1
0.5 TABLET ORAL 2 TIMES DAILY PRN
Qty: 60 TABLET | Refills: 0 | Status: SHIPPED | OUTPATIENT
Start: 2020-04-30 | End: 2020-05-28 | Stop reason: SDUPTHER

## 2020-04-30 NOTE — LETTER
May 1, 2020      Estefani Asencio MD  [address]  [place] [address]           [facility]  [address]  [facility] [place] [address]  Phone: [phone]  Fax: [phone]          Patient: Josey Flores   MR Number: [id number]   YOB: 1970   Date of Visit: 4/30/2020       Dear Dr. Estefani Asencio:    Thank you for referring Josey Flores to me for evaluation. Attached you will find relevant portions of my assessment and plan of care.    If you have questions, please do not hesitate to call me. I look forward to following Josey Flores along with you.    Sincerely,    Jazmyn Rubio MD    Enclosure  CC:  No Recipients    If you would like to receive this communication electronically, please contact [email or web address] or [phone] to request more information on [facility] Link access.    For providers and/or their staff who would like to refer a patient to [facility], please contact us through our one-stop-shop provider referral line, [facility], at [phone].    If you feel you have received this communication in error or would no longer like to receive these types of communications, please e-mail [email or web address]

## 2020-04-30 NOTE — PROGRESS NOTES
Subjective:       Patient ID: Josey Flores is a 49 y.o. female.    Chief Complaint:palliative medicine consultation     The patient location is: [place]  The chief complaint leading to consultation is: palliative consult  Visit type: audiovisual  Total time spent with patient: 30 minutes  Each patient to whom he or she provides medical services by telemedicine is:  (1) informed of the relationship between the physician and patient and the respective role of any other health care provider with respect to management of the patient; and (2) notified that he or she may decline to receive medical services by telemedicine and may withdraw from such care at any time.    Notes: 50 yo female with metastatic breast cancer; she has significant pain particularly to her right rib cage which is worst at night; takes hydrocodone 7.5mg tablets wtihout much relief; typically feels better after taking an additional dose about an hour later. She takes 6-8 hydrocodone per day. She has h/o depression including with hospitalization nearly 20 years ago, but denies depressed feelings currently. She admits to some difficulty coping with quarantine due to limited contact with her family. She states she has good support generally from her children and very much enjoys her 2 years old grandchild.     Advance Care Planning    Power of   I initiated the process of advance care planning today and explained the importance of this process to the patient.  I introduced the concept of advance directives to the patient, as well. Then the patient received detailed information about the importance of designating a Health Care Power of  (HCPOA). She was also instructed to communicate with this person about their wishes for future healthcare, should she become sick and lose decision-making capacity. The patient has not previously appointed a HCPOA. After our discussion, the patient has decided to complete a HCPOA and has appointed her  sister and NAME:David Acevedo (sister)  I spent a total time of 5 minutes discussing this issue with the patient.     Living Will  During this visit, I engaged the patient in the advance care planning process.  The patient and I reviewed the role for advance directives and their purpose in directing future healthcare if the patient's unable to speak for him/herself.  At this point in time, the patient does have full decision-making capacity.  We discussed different extreme health states that she could experience, and reviewed what kind of medical care she would want in those situations. The patient communicated that if she were comatose and had little chance of a meaningful recovery, she would not want machines/life-sustaining treatments used. In addition to the above preference, other important end-of-life issues for the patient include no artificial nutrition. The patient has completed a living will to reflect these preferences.  I spent a total of 20  minutes engaging the patient in this advance care planning discussion.    Since we are not meeting face to face, I have asked my support team to coordinate completion of ACP documents at her next in-person visit.               does not have any pertinent problems on file.  Past Medical History:   Diagnosis Date    Anxiety     Asthma     Cancer     right breast, metastatic-lymph nodes, bone, and thyroid     COPD (chronic obstructive pulmonary disease)     Depression     History of psychiatric hospitalization     2000; suicidal ideation    Hx of psychiatric care     Hypothyroidism     Miscarriage     five miscarriages    Obesity     Psychiatric problem     Thyroid cancer 2017     Past Surgical History:   Procedure Laterality Date    ABSCESS DRAINAGE      bilateral axilla    ADENOIDECTOMY      APPENDECTOMY      CHOLECYSTECTOMY      MASS EXCISION      SKIN GRAFT  06/16/2017    THYROIDECTOMY Bilateral     TONSILLECTOMY       Family History   Problem  Relation Age of Onset    Arthritis Mother     Early death Mother         64 at time of death    Heart attack Mother     Heart disease Mother     Heart failure Mother     Hypertension Mother     Coronary artery disease Father     Hearing loss Father     Heart disease Father     Hyperlipidemia Father     Hypertension Father     Mental illness Father     Learning disabilities Son     Cancer Maternal Aunt      Social History     Socioeconomic History    Marital status:      Spouse name: Not on file    Number of children: 2    Years of education: Not on file    Highest education level: Not on file   Occupational History    Not on file   Social Needs    Financial resource strain: Not on file    Food insecurity:     Worry: Not on file     Inability: Not on file    Transportation needs:     Medical: Not on file     Non-medical: Not on file   Tobacco Use    Smoking status: Current Every Day Smoker     Packs/day: 1.00     Years: 32.00     Pack years: 32.00     Types: Cigarettes     Start date: 1985     Last attempt to quit: 2017     Years since quittin.8    Smokeless tobacco: Never Used    Tobacco comment: 45 pack year history   Substance and Sexual Activity    Alcohol use: No    Drug use: No    Sexual activity: Yes     Partners: Male   Lifestyle    Physical activity:     Days per week: Not on file     Minutes per session: Not on file    Stress: Not on file   Relationships    Social connections:     Talks on phone: Not on file     Gets together: Not on file     Attends [employer] service: Not on file     Active member of club or organization: Not on file     Attends meetings of clubs or organizations: Not on file     Relationship status: Not on file   Other Topics Concern    Patient feels they ought to cut down on drinking/drug use Not Asked    Patient annoyed by others criticizing their drinking/drug use Not Asked    Patient has felt bad or guilty about drinking/drug use  Not Asked    Patient has had a drink/used drugs as an eye opener in the AM Not Asked   Social History Narrative     with two adult children; common law marriage (10+years); raised Gnosticist, no current Denominational practice     Review of Systems   A comprehensive 14-point review of systems was reviewed with patient and was negative other than as specified above.     Objective:   There were no vitals filed for this visit.     Physical Exam   Constitutional: She is oriented to person, place, and time. She appears well-developed and well-nourished. No distress.   HENT:   Head: Normocephalic and atraumatic.   Eyes: EOM are normal. No scleral icterus.   Neck: Normal range of motion. Neck supple.   Pulmonary/Chest: Effort normal. No respiratory distress.   Musculoskeletal: Normal range of motion.   Neurological: She is alert and oriented to person, place, and time.   Skin: No rash noted.   Psychiatric: She has a normal mood and affect. Her behavior is normal. Thought content normal.       Assessment:       1. Cancer associated pain    2. Anxiety    3. Malignant neoplasm of overlapping sites of right breast in female, estrogen receptor positive    4. Malignant neoplasm metastatic to lymph node of axilla    5. ACP (advance care planning)        Plan:           Problem List Items Addressed This Visit        Psychiatric    Anxiety    Relevant Medications    ALPRAZolam (XANAX) 0.5 MG tablet       Oncology    Malignant neoplasm of overlapping sites of right breast in female, estrogen receptor positive    Malignant neoplasm metastatic to lymph node of axilla       Palliative Care    ACP (advance care planning)    Overview     Has named her sister her HCPOA    Wishes to avoid prolonged artificial life support; would like to remain a full code at this time but would like to continue this conversation as her health status evolves.           Other Visit Diagnoses     Cancer associated pain    -  Primary    Relevant Medications     morphine (MS CONTIN) 15 MG 12 hr tablet    HYDROcodone-acetaminophen (NORCO)  mg per tablet      Will see back in 1 week to discuss medication changes.     Thank you for involving me in the care of this patient.

## 2020-05-01 PROBLEM — Z71.89 ACP (ADVANCE CARE PLANNING): Status: ACTIVE | Noted: 2020-05-01

## 2020-05-18 ENCOUNTER — TELEPHONE (OUTPATIENT)
Dept: PHARMACY | Facility: CLINIC | Age: 50
End: 2020-05-18

## 2020-05-20 ENCOUNTER — LAB VISIT (OUTPATIENT)
Dept: LAB | Facility: HOSPITAL | Age: 50
End: 2020-05-20
Attending: INTERNAL MEDICINE
Payer: MEDICAID

## 2020-05-20 ENCOUNTER — INFUSION (OUTPATIENT)
Dept: INFUSION THERAPY | Facility: HOSPITAL | Age: 50
End: 2020-05-20
Attending: INTERNAL MEDICINE
Payer: MEDICAID

## 2020-05-20 ENCOUNTER — OFFICE VISIT (OUTPATIENT)
Dept: HEMATOLOGY/ONCOLOGY | Facility: CLINIC | Age: 50
End: 2020-05-20
Payer: MEDICAID

## 2020-05-20 VITALS
HEART RATE: 79 BPM | HEIGHT: 67 IN | TEMPERATURE: 97 F | DIASTOLIC BLOOD PRESSURE: 65 MMHG | RESPIRATION RATE: 18 BRPM | SYSTOLIC BLOOD PRESSURE: 118 MMHG | WEIGHT: 225.06 LBS | OXYGEN SATURATION: 95 % | BODY MASS INDEX: 35.33 KG/M2

## 2020-05-20 VITALS
TEMPERATURE: 97 F | DIASTOLIC BLOOD PRESSURE: 75 MMHG | BODY MASS INDEX: 35.33 KG/M2 | OXYGEN SATURATION: 98 % | HEART RATE: 86 BPM | WEIGHT: 225.06 LBS | SYSTOLIC BLOOD PRESSURE: 118 MMHG | HEIGHT: 67 IN | RESPIRATION RATE: 16 BRPM

## 2020-05-20 DIAGNOSIS — C79.51 SECONDARY CANCER OF BONE: ICD-10-CM

## 2020-05-20 DIAGNOSIS — C50.811 MALIGNANT NEOPLASM OF OVERLAPPING SITES OF RIGHT BREAST IN FEMALE, ESTROGEN RECEPTOR POSITIVE: Primary | ICD-10-CM

## 2020-05-20 DIAGNOSIS — C50.811 MALIGNANT NEOPLASM OF OVERLAPPING SITES OF RIGHT BREAST IN FEMALE, ESTROGEN RECEPTOR POSITIVE: ICD-10-CM

## 2020-05-20 DIAGNOSIS — Z17.0 MALIGNANT NEOPLASM OF OVERLAPPING SITES OF RIGHT BREAST IN FEMALE, ESTROGEN RECEPTOR POSITIVE: ICD-10-CM

## 2020-05-20 DIAGNOSIS — C79.51 SECONDARY CANCER OF BONE: Primary | ICD-10-CM

## 2020-05-20 DIAGNOSIS — C77.3 MALIGNANT NEOPLASM METASTATIC TO LYMPH NODE OF AXILLA: ICD-10-CM

## 2020-05-20 DIAGNOSIS — Z17.0 MALIGNANT NEOPLASM OF OVERLAPPING SITES OF RIGHT BREAST IN FEMALE, ESTROGEN RECEPTOR POSITIVE: Primary | ICD-10-CM

## 2020-05-20 LAB
ALBUMIN SERPL BCP-MCNC: 3.9 G/DL (ref 3.5–5.2)
ALP SERPL-CCNC: 71 U/L (ref 55–135)
ALT SERPL W/O P-5'-P-CCNC: 35 U/L (ref 10–44)
ANION GAP SERPL CALC-SCNC: 11 MMOL/L (ref 8–16)
AST SERPL-CCNC: 26 U/L (ref 10–40)
BASOPHILS # BLD AUTO: 0.07 K/UL (ref 0–0.2)
BASOPHILS NFR BLD: 0.9 % (ref 0–1.9)
BILIRUB SERPL-MCNC: 0.4 MG/DL (ref 0.1–1)
BUN SERPL-MCNC: 7 MG/DL (ref 6–20)
CALCIUM SERPL-MCNC: 9.2 MG/DL (ref 8.7–10.5)
CHLORIDE SERPL-SCNC: 103 MMOL/L (ref 95–110)
CO2 SERPL-SCNC: 25 MMOL/L (ref 23–29)
CREAT SERPL-MCNC: 1.6 MG/DL (ref 0.5–1.4)
DIFFERENTIAL METHOD: ABNORMAL
EOSINOPHIL # BLD AUTO: 0.2 K/UL (ref 0–0.5)
EOSINOPHIL NFR BLD: 2.8 % (ref 0–8)
ERYTHROCYTE [DISTWIDTH] IN BLOOD BY AUTOMATED COUNT: 13.6 % (ref 11.5–14.5)
EST. GFR  (AFRICAN AMERICAN): 43 ML/MIN/1.73 M^2
EST. GFR  (NON AFRICAN AMERICAN): 37 ML/MIN/1.73 M^2
GLUCOSE SERPL-MCNC: 116 MG/DL (ref 70–110)
HCT VFR BLD AUTO: 40.7 % (ref 37–48.5)
HGB BLD-MCNC: 13.4 G/DL (ref 12–16)
IMM GRANULOCYTES # BLD AUTO: 0.19 K/UL (ref 0–0.04)
IMM GRANULOCYTES NFR BLD AUTO: 2.5 % (ref 0–0.5)
LYMPHOCYTES # BLD AUTO: 1.8 K/UL (ref 1–4.8)
LYMPHOCYTES NFR BLD: 23.7 % (ref 18–48)
MCH RBC QN AUTO: 36.8 PG (ref 27–31)
MCHC RBC AUTO-ENTMCNC: 32.9 G/DL (ref 32–36)
MCV RBC AUTO: 112 FL (ref 82–98)
MONOCYTES # BLD AUTO: 0.5 K/UL (ref 0.3–1)
MONOCYTES NFR BLD: 6.6 % (ref 4–15)
NEUTROPHILS # BLD AUTO: 4.8 K/UL (ref 1.8–7.7)
NEUTROPHILS NFR BLD: 63.5 % (ref 38–73)
NRBC BLD-RTO: 0 /100 WBC
PLATELET # BLD AUTO: 266 K/UL (ref 150–350)
PMV BLD AUTO: 11 FL (ref 9.2–12.9)
POTASSIUM SERPL-SCNC: 4.5 MMOL/L (ref 3.5–5.1)
PROT SERPL-MCNC: 7.7 G/DL (ref 6–8.4)
RBC # BLD AUTO: 3.64 M/UL (ref 4–5.4)
SODIUM SERPL-SCNC: 139 MMOL/L (ref 136–145)
WBC # BLD AUTO: 7.58 K/UL (ref 3.9–12.7)

## 2020-05-20 PROCEDURE — 99215 PR OFFICE/OUTPT VISIT, EST, LEVL V, 40-54 MIN: ICD-10-PCS | Mod: S$PBB,,, | Performed by: INTERNAL MEDICINE

## 2020-05-20 PROCEDURE — 99999 PR PBB SHADOW E&M-EST. PATIENT-LVL IV: CPT | Mod: PBBFAC,,, | Performed by: INTERNAL MEDICINE

## 2020-05-20 PROCEDURE — 96372 THER/PROPH/DIAG INJ SC/IM: CPT

## 2020-05-20 PROCEDURE — 99999 PR PBB SHADOW E&M-EST. PATIENT-LVL IV: ICD-10-PCS | Mod: PBBFAC,,, | Performed by: INTERNAL MEDICINE

## 2020-05-20 PROCEDURE — 80053 COMPREHEN METABOLIC PANEL: CPT

## 2020-05-20 PROCEDURE — 85025 COMPLETE CBC W/AUTO DIFF WBC: CPT

## 2020-05-20 PROCEDURE — 99215 OFFICE O/P EST HI 40 MIN: CPT | Mod: S$PBB,,, | Performed by: INTERNAL MEDICINE

## 2020-05-20 PROCEDURE — 63600175 PHARM REV CODE 636 W HCPCS: Mod: JG | Performed by: INTERNAL MEDICINE

## 2020-05-20 PROCEDURE — 36415 COLL VENOUS BLD VENIPUNCTURE: CPT

## 2020-05-20 PROCEDURE — 99214 OFFICE O/P EST MOD 30 MIN: CPT | Mod: PBBFAC,25 | Performed by: INTERNAL MEDICINE

## 2020-05-20 RX ADMIN — DENOSUMAB 120 MG: 120 INJECTION SUBCUTANEOUS at 11:05

## 2020-05-20 NOTE — PROGRESS NOTES
Subjective:      DATE OF VISIT: 5/20/2020   ?   ?   Patient ID:?Josey Flores is a 49 y.o. female.?? MR#: [id number]   ?   PRIMARY PROVIDER: Dr. Asencio  ?   CHIEF COMPLAINT:  Follow-up  ?   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   CURRENT TREATMENT: palbociclib and letrozole    HPI  She was seen by palliative care and feels much better a new pain regimen of 1 morphine and 1 Norco daily with improvement in sleep.  She continues stay active at home.  She notes mild intentional weight loss.    ONCOLOGIC HISTORY:      initial diagnosis after self palpation of mass near right nipple.  Further imaging revealed two lesions in right breast, axillary lymph node.  PET-CT in January 2017 revealed metastatic disease involving 2 lesions in right breast, right axilla, and metastatic disease in manubrium as well as left posterior ilium.  Thyroid was also noted to have avidity.  This was found to be papillary thyroid cancer and she underwent total thyroidectomy.  She notes development of bilateral upper extremity edema and underwent palliative bilateral axillary resection as well as resection of metastatic focus in left ilial region per patient report.     She also has a history of HSIL MIREILLE 3 status post LEEP in 2017.     Review of Systems    ?   A comprehensive 14-point review of systems was reviewed with patient and was negative other than as specified above.   ?     Objective:      Physical Exam      ?   Vitals:    05/20/20 1054   BP: 118/75   Pulse: 86   Resp: 16   Temp: 97.3 °F (36.3 °C)   General appearance: Generally well appearing, in no acute distress.   Head, eyes, ears, nose, and throat: Oropharynx clear with moist mucous membranes.   Cardiovascular: Regular rate and rhythm, S1, S2, no audible murmurs.   Respiratory: Lungs clear to auscultation  bilaterally.   Abdomen: nontender, nondistended.   Extremities: Warm, without edema.   Neurologic: Alert and oriented.  Skin: No rashes, ecchymoses or petechial lesion.   Psychiatric: normal mood and affect, conversant and appropriate    Laboratory:  ?   Lab Visit on 05/20/2020   Component Date Value Ref Range Status    WBC 05/20/2020 7.58  3.90 - 12.70 K/uL Final    RBC 05/20/2020 3.64* 4.00 - 5.40 M/uL Final    Hemoglobin 05/20/2020 13.4  12.0 - 16.0 g/dL Final    Hematocrit 05/20/2020 40.7  37.0 - 48.5 % Final    Mean Corpuscular Volume 05/20/2020 112* 82 - 98 fL Final    Mean Corpuscular Hemoglobin 05/20/2020 36.8* 27.0 - 31.0 pg Final    Mean Corpuscular Hemoglobin Conc 05/20/2020 32.9  32.0 - 36.0 g/dL Final    RDW 05/20/2020 13.6  11.5 - 14.5 % Final    Platelets 05/20/2020 266  150 - 350 K/uL Final    MPV 05/20/2020 11.0  9.2 - 12.9 fL Final    Immature Granulocytes 05/20/2020 2.5* 0.0 - 0.5 % Final    Gran # (ANC) 05/20/2020 4.8  1.8 - 7.7 K/uL Final    Immature Grans (Abs) 05/20/2020 0.19* 0.00 - 0.04 K/uL Final    Lymph # 05/20/2020 1.8  1.0 - 4.8 K/uL Final    Mono # 05/20/2020 0.5  0.3 - 1.0 K/uL Final    Eos # 05/20/2020 0.2  0.0 - 0.5 K/uL Final    Baso # 05/20/2020 0.07  0.00 - 0.20 K/uL Final    nRBC 05/20/2020 0  0 /100 WBC Final    Gran% 05/20/2020 63.5  38.0 - 73.0 % Final    Lymph% 05/20/2020 23.7  18.0 - 48.0 % Final    Mono% 05/20/2020 6.6  4.0 - 15.0 % Final    Eosinophil% 05/20/2020 2.8  0.0 - 8.0 % Final    Basophil% 05/20/2020 0.9  0.0 - 1.9 % Final    Differential Method 05/20/2020 Automated   Final    Sodium 05/20/2020 139  136 - 145 mmol/L Final    Potassium 05/20/2020 4.5  3.5 - 5.1 mmol/L Final    Chloride 05/20/2020 103  95 - 110 mmol/L Final    CO2 05/20/2020 25  23 - 29 mmol/L Final    Glucose 05/20/2020 116* 70 - 110 mg/dL Final    BUN, Bld 05/20/2020 7  6 - 20 mg/dL Final    Creatinine 05/20/2020 1.6* 0.5 - 1.4 mg/dL Final    Calcium 05/20/2020 9.2   8.7 - 10.5 mg/dL Final    Total Protein 05/20/2020 7.7  6.0 - 8.4 g/dL Final    Albumin 05/20/2020 3.9  3.5 - 5.2 g/dL Final    Total Bilirubin 05/20/2020 0.4  0.1 - 1.0 mg/dL Final    Alkaline Phosphatase 05/20/2020 71  55 - 135 U/L Final    AST 05/20/2020 26  10 - 40 U/L Final    ALT 05/20/2020 35  10 - 44 U/L Final    Anion Gap 05/20/2020 11  8 - 16 mmol/L Final    eGFR if African American 05/20/2020 43* >60 mL/min/1.73 m^2 Final    eGFR if non African American 05/20/2020 37* >60 mL/min/1.73 m^2 Final      ? IMAGING      2/26/20  CT CHEST ABDOMEN PELVIS WITH CONTRAST (XPD)    CLINICAL HISTORY:  Malignant neoplasm of overlapping sites of right female breastrestaging metastatic breast cancer;    FINDINGS:  CT of the chest with contrast.    Images through the chest reveal no detrimental change.  No mediastinal mass or bulky adenopathy.  Normal heart size.  Calcified nodes again identified.  No hilar mass or enlargement and no axillary abnormality is seen.  The lungs again show emphysematous change.  Mild atelectatic changes noted.  No suspicious nodule or mass.  Small nodule right upper lobe is again seen measuring 5 mm in diameter, unchanged..  Bony windows show no acute abnormality.  No detrimental change.  No suspicious lytic or blastic change.  Cystic change again noted within the sternum.    CT of the abdomen and pelvis with contrast.    Bony windows show no detrimental change.  Degenerative changes are noted.  Lytic lesions within the iliacs appear grossly stable.  There is no free intraperitoneal air within the abdomen or pelvis.  No bowel obstruction.    The liver again shows low-attenuation change consistent with fatty change.  No definite focal lesion is seen.  Gallbladder has been removed.  There is no adrenal or pancreatic mass or enlargement.  No solid renal mass or obstruction.  No ascites or adenopathy.  Moderate volume of stool in the colon.  Borderline colonic wall thickening of the  rectosigmoid possibly related to incomplete distention.  No abnormality to suggest acute appendicitis.      Impression       1.  No detrimental change in the appearance of the CT abdomen, pelvis, and chest since September 2019.    2.  Fatty liver.  Emphysematous change.  Bony changes are static.     US SOFT TISSUE HEAD NECK THYROID    CLINICAL HISTORY:  Personal history of malignant neoplasm of thyroidPatient with swelling of left submandibular gland with tenderness and also needs neck evaluation for pathologic lesions as she has a history of thyroid cancer status post thyroidectomy;    FINDINGS:  Patient is status post thyroidectomy.  Correlated with prior study from 2019.    The thyroid bed is unremarkable.  No mass.  No unusual fluid collection.    There is a small cervical node on the left measuring 6 mm in maximum diameter.  In the submandibular region on the left there is a node measuring 25 by is a 7 x 10 mm in diameter.  In the right cervical area there is are nodes measuring 7 mm and 6 mm in greatest length.      Impression       1.  There is no residual mass or thyroid tissue within the region of the thyroid bed.    2.  Lymph nodes bilaterally as described, most of which are less than a cm in diameter and considered normal.  There is 1 enlarged node in the region of the left submandibular gland as described.  Significance is indeterminate.       ?   Assessment/Plan:       1. Malignant neoplasm of overlapping sites of right breast in female, estrogen receptor positive    2. Malignant neoplasm metastatic to lymph node of axilla    3. Secondary cancer of bone          Plan:     # metastatic breast:  Continued on systemic therapy with palbociclib and letrozole, tolerating well.  No new clinical symptoms or concerns, new pain or blood count abnormalities.  Previously discussed plan for restaging exams in 3-6 months from prior and will plan around August 26, 2020.     # Chronic cancer related pain:  Following  with palliative Care, well managed with 1 morphine in 1 Norco daily on average, improved sleep.    # bony metastatic disease:  Continues on prophylactic Xgeva, calcium and vitamin-D supplementation, had been on monthly and then [id number] over last year. No new bone issues although discussed typically administered [id number] and will proceed with this dosing schedule unless concern for side effect.  No dental issues, normal calcium and adequate renal function on labs today.    # tobacco use:  Counseled again for least 5 min on importance of cessation, continues to smoke.  She has trialed nicotine patch and venlafaxine without improvement in discontinued both    # HSIL:  Status post LEEP 2017, encouraged follow-up with her gynecologist for surveillance, no current acute issues/vaginal bleeding.    # history of papillary thyroid carcinoma status post total thyroidectomy on 08/31/2017: s/p total thyroidectomy on levothyroxine.  Metastatic breast cancer restaging imaging without findings for locally recurrent disease.  Follows with Dr. Jason in endocrinology last seen 11/2019 with repeated TSH and thyroglobulin newly elevated.  Recent ultrasound without residual mass or thyroid tissue in bed.  Normal lymph nodes bilaterally subcentimeter, 1 enlarged since left submandibular gland unclear significance.  Continue to follow with Endocrinology.    Follow-Up:   - RV in 1 month with labs and xgeva

## 2020-05-20 NOTE — NURSING
11:44 AM Injection given without difficulties.Bandaid applied. Patient instructed to stay in the clinic for 15 minutes. Patient verbalized understanding and will notify nurse with any complaints.

## 2020-05-20 NOTE — DISCHARGE INSTRUCTIONS
[facility]  [address] [address]  [address]  [phone] phone     [phone] fax  Hours of Operation: Monday- Friday 8:00am- 5:00pm  After hours phone  [phone]  Hematology / Oncology Physicians on call      VINNY Danielle Dr., Dr., Dr., Dr., NP Sydney Prescott, NP Tyesha Taylor, NP    Please call with any concerns regarding your appointment today.

## 2020-05-28 ENCOUNTER — OFFICE VISIT (OUTPATIENT)
Dept: ENDOCRINOLOGY | Facility: CLINIC | Age: 50
End: 2020-05-28
Payer: MEDICAID

## 2020-05-28 DIAGNOSIS — C50.811 MALIGNANT NEOPLASM OF OVERLAPPING SITES OF RIGHT BREAST IN FEMALE, ESTROGEN RECEPTOR POSITIVE: ICD-10-CM

## 2020-05-28 DIAGNOSIS — E89.0 POSTOPERATIVE HYPOTHYROIDISM: Primary | ICD-10-CM

## 2020-05-28 DIAGNOSIS — Z17.0 MALIGNANT NEOPLASM OF OVERLAPPING SITES OF RIGHT BREAST IN FEMALE, ESTROGEN RECEPTOR POSITIVE: ICD-10-CM

## 2020-05-28 DIAGNOSIS — Z85.850 HISTORY OF THYROID CANCER: ICD-10-CM

## 2020-05-28 PROCEDURE — 99213 PR OFFICE/OUTPT VISIT, EST, LEVL III, 20-29 MIN: ICD-10-PCS | Mod: 95,,, | Performed by: INTERNAL MEDICINE

## 2020-05-28 PROCEDURE — 99213 OFFICE O/P EST LOW 20 MIN: CPT | Mod: 95,,, | Performed by: INTERNAL MEDICINE

## 2020-05-28 NOTE — PROGRESS NOTES
Patient ID: Josey Flores is a 49 y.o. female.  Patient is here for follow up  The patient location is:  home  The chief complaint leading to consultation is:  Hypothyroidism    Visit type: audiovisual    Face to Face time with patient:  15 min  Twenty minutes of total time spent on the encounter, which includes face to face time and non-face to face time preparing to see the patient (eg, review of tests), Obtaining and/or reviewing separately obtained history, Documenting clinical information in the electronic or other health record, Independently interpreting results (not separately reported) and communicating results to the patient/family/caregiver, or Care coordination (not separately reported).         Each patient to whom he or she provides medical services by telemedicine is:  (1) informed of the relationship between the physician and patient and the respective role of any other health care provider with respect to management of the patient; and (2) notified that he or she may decline to receive medical services by telemedicine and may withdraw from such care at any time.    Notes:       Chief Complaint: Hypothyroidism      Follow-up   Pertinent negatives include no arthralgias, chest pain, fatigue, fever, headaches, joint swelling, nausea, numbness, rash, sore throat, vomiting or weakness.        Patient with history of Metastatic multifocal infiltrating ductal carcinoma of the right breast with right axillary and bone involvement [ER positive/NM positive/HER-2 negative] for which she has received adjuvant therapy with good response with no FDG avid areas remaining regarding her breast cancer, who was found to have an FDG avid area on PET scan in the right thyroid, FNA was suspicious for papillary thyroid cancer, she subsequently underwent a total thyroidectomy performed by Dr. Zaid Baugh on August 31, 2017 and final pathology showed the following:  Thyroid, total thyroidectomy:  Papillary carcinoma,  follicular variant, 2 cm, [id number].  Second focus of micropapillary carcinoma adjacent to main tumor.  Two lymph nodes negative for carcinoma.  See note and synoptic report below.  Note: Reviewed by Dr. Lois Ortiz who concurs with the diagnosis.  Synoptic report: Thyroid Gland  Procedure: Total thyroidectomy.  Tumor focality: Multifocal.  Tumor site: Right lobe.  Tumor size: 2 x 2 x 1.5 cm.  Histologic type: Papillary carcinoma, follicular variant, encapsulated/well demarcated, with tumor capsular  invasion.  Margins: Uninvolved by carcinoma.  Angioinvasion: Not identified.  Lymphatic invasion: Not identified.  Extrathyroidal extension: Not identified.  Regional lymph nodes:  -Number of lymph nodes involved: 0.  -Number of lymph nodes examined: 2 (incidental perithyroidal).  Pathologic Stage Classification: [id number].    She did not receive radioactive iodine treatment.  She and her  at the time did not want to pursue any further treatment be on thyroidectomy especially since she was dealing with her breast cancer diagnosis    She is followed by oncology and currently is on Ibrance, a kinase inhibitor and also on letrozole and has received xgeva/denosamab    She is currently on levothyroxine 150 mcg,  and takes a multivitamin and calcium 2000 mg daily-TUMS-and she make sure to space her vitamins 4-5 hours from her thyroid medication    Postoperatively her calcium was low but subsequent calcium levels have been fine    Thyroglobulin levels have been negative however her TSH was elevated on most recent test to 10 and her thyroglobulin was 6.3 ; once in the past when her TSH was 55 her thyroglobulin was 8.1, at that time she was taking calcium along with her thyroid medication    Neck ultrasound June 2019 looked fine with no pathologic lesions  She is on Ibrance and xgeva    Patient is a smoker and has COPD and has chronic dyspnea on exertion    She complains of a cough mostly at nighttime and has had some  tiredness and fatigue      Also history of anxiety and depression and see psychiatry  She has been suffering from depression and having a hard time adjusting to her diagnosis  Denies swelling other than her chronic intermittent ankle swelling that has not worsened    In the past had  MRI findings showing metastasis in her spine and pelvis she has undergone radiation    She complains of sharp intermittent pains in various places such as and her upper abdomen, or pelvic area or back area and occasionally her appetite is not the best    Weight is overall stable , denies any palpitations or sweats  A previous PET scan can showed the following:  PET scan 2017 that showed the followin. Interval thyroidectomy with some uptake noted in the region of the thyroid bed which is thought to be postsurgical in nature.     2.  Stable appearance of non-FDG avid osseus metastatic lesions involving the left ilium and manubrium.    At last visit she was concerned about not being able to lose weight and lately the left anterior side underneath her mandible area is more sore in might of been more enlarged for a week , I increased her Synthroid to 175 mcg, her neck ultrasound showed stable cervical lymph nodes and submandibular area had 1 lymph node on the left that was enlarged but she says that enlargement has resolved and she feels her energy is better in her weight has come down on a higher dose of Synthroid, denies any palpitations     I have reviewed the past medical, family and social history    Review of Systems   Constitutional: Negative for appetite change, fatigue, fever and unexpected weight change.   HENT: Negative for sore throat and trouble swallowing.    Eyes: Negative for visual disturbance.   Respiratory: Negative for shortness of breath and wheezing.    Cardiovascular: Negative for chest pain, palpitations and leg swelling.   Gastrointestinal: Negative for diarrhea, nausea and vomiting.   Endocrine:  Negative for cold intolerance, heat intolerance, polydipsia, polyphagia and polyuria.   Genitourinary: Negative for difficulty urinating, dysuria and menstrual problem.   Musculoskeletal: Negative for arthralgias and joint swelling.   Skin: Negative for rash.   Neurological: Negative for dizziness, weakness, numbness and headaches.   Psychiatric/Behavioral: Negative for confusion, dysphoric mood and sleep disturbance.       Objective:      Physical Exam   Constitutional: No distress.   Neurological: She is alert.   Psychiatric: She has a normal mood and affect.         Lab Review:   Lab Visit on 05/20/2020   Component Date Value    WBC 05/20/2020 7.58     RBC 05/20/2020 3.64*    Hemoglobin 05/20/2020 13.4     Hematocrit 05/20/2020 40.7     Mean Corpuscular Volume 05/20/2020 112*    Mean Corpuscular Hemoglo* 05/20/2020 36.8*    Mean Corpuscular Hemoglo* 05/20/2020 32.9     RDW 05/20/2020 13.6     Platelets 05/20/2020 266     MPV 05/20/2020 11.0     Immature Granulocytes 05/20/2020 2.5*    Gran # (ANC) 05/20/2020 4.8     Immature Grans (Abs) 05/20/2020 0.19*    Lymph # 05/20/2020 1.8     Mono # 05/20/2020 0.5     Eos # 05/20/2020 0.2     Baso # 05/20/2020 0.07     nRBC 05/20/2020 0     Gran% 05/20/2020 63.5     Lymph% 05/20/2020 23.7     Mono% 05/20/2020 6.6     Eosinophil% 05/20/2020 2.8     Basophil% 05/20/2020 0.9     Differential Method 05/20/2020 Automated     Sodium 05/20/2020 139     Potassium 05/20/2020 4.5     Chloride 05/20/2020 103     CO2 05/20/2020 25     Glucose 05/20/2020 116*    BUN, Bld 05/20/2020 7     Creatinine 05/20/2020 1.6*    Calcium 05/20/2020 9.2     Total Protein 05/20/2020 7.7     Albumin 05/20/2020 3.9     Total Bilirubin 05/20/2020 0.4     Alkaline Phosphatase 05/20/2020 71     AST 05/20/2020 26     ALT 05/20/2020 35     Anion Gap 05/20/2020 11     eGFR if African American 05/20/2020 43*    eGFR if non African Amer* 05/20/2020 37*   Lab Visit on  04/22/2020   Component Date Value    Sodium 04/22/2020 139     Potassium 04/22/2020 4.5     Chloride 04/22/2020 103     CO2 04/22/2020 26     Glucose 04/22/2020 121*    BUN, Bld 04/22/2020 10     Creatinine 04/22/2020 1.5*    Calcium 04/22/2020 9.1     Total Protein 04/22/2020 7.4     Albumin 04/22/2020 3.9     Total Bilirubin 04/22/2020 0.4     Alkaline Phosphatase 04/22/2020 63     AST 04/22/2020 16     ALT 04/22/2020 23     Anion Gap 04/22/2020 10     eGFR if  04/22/2020 46*    eGFR if non African Amer* 04/22/2020 40*    WBC 04/22/2020 4.86     RBC 04/22/2020 3.39*    Hemoglobin 04/22/2020 13.0     Hematocrit 04/22/2020 37.4     Mean Corpuscular Volume 04/22/2020 110*    Mean Corpuscular Hemoglo* 04/22/2020 38.3*    Mean Corpuscular Hemoglo* 04/22/2020 34.8     RDW 04/22/2020 13.5     Platelets 04/22/2020 247     MPV 04/22/2020 10.6     Immature Granulocytes 04/22/2020 1.0*    Gran # (ANC) 04/22/2020 3.0     Immature Grans (Abs) 04/22/2020 0.05*    Lymph # 04/22/2020 1.4     Mono # 04/22/2020 0.3     Eos # 04/22/2020 0.1     Baso # 04/22/2020 0.07     nRBC 04/22/2020 0     Gran% 04/22/2020 60.9     Lymph% 04/22/2020 28.6     Mono% 04/22/2020 6.2     Eosinophil% 04/22/2020 1.9     Basophil% 04/22/2020 1.4     Platelet Estimate 04/22/2020 Appears normal     Aniso 04/22/2020 Slight     Poik 04/22/2020 Slight     Ovalocytes 04/22/2020 Occasional     Tear Drop Cells 04/22/2020 Occasional     Stomatocytes 04/22/2020 Present     Differential Method 04/22/2020 Automated    Clinical Support on 04/21/2020   Component Date Value    SARS-CoV2 (COVID-19) Jonathan* 04/21/2020 Not Detected    Lab Visit on 02/26/2020   Component Date Value    WBC 02/26/2020 2.81*    RBC 02/26/2020 3.29*    Hemoglobin 02/26/2020 12.4     Hematocrit 02/26/2020 36.9*    Mean Corpuscular Volume 02/26/2020 112*    Mean Corpuscular Hemoglo* 02/26/2020 37.7*    Mean Corpuscular Hemoglo*  02/26/2020 33.6     RDW 02/26/2020 13.2     Platelets 02/26/2020 211     MPV 02/26/2020 10.0     Immature Granulocytes 02/26/2020 0.4     Gran # (ANC) 02/26/2020 1.5*    Immature Grans (Abs) 02/26/2020 0.01     Lymph # 02/26/2020 1.0     Mono # 02/26/2020 0.2*    Eos # 02/26/2020 0.1     Baso # 02/26/2020 0.04     nRBC 02/26/2020 0     Gran% 02/26/2020 52.3     Lymph% 02/26/2020 35.0     Mono% 02/26/2020 8.7     Eosinophil% 02/26/2020 2.2     Basophil% 02/26/2020 1.4     Differential Method 02/26/2020 Automated     Sodium 02/26/2020 139     Potassium 02/26/2020 4.0     Chloride 02/26/2020 105     CO2 02/26/2020 25     Glucose 02/26/2020 114*    BUN, Bld 02/26/2020 8     Creatinine 02/26/2020 1.4     Calcium 02/26/2020 9.3     Total Protein 02/26/2020 7.1     Albumin 02/26/2020 3.8     Total Bilirubin 02/26/2020 0.4     Alkaline Phosphatase 02/26/2020 57     AST 02/26/2020 17     ALT 02/26/2020 23     Anion Gap 02/26/2020 9     eGFR if African American 02/26/2020 50*    eGFR if non African Amer* 02/26/2020 44*    Creatinine 02/26/2020 1.4     eGFR if African American 02/26/2020 50*    eGFR if non African Amer* 02/26/2020 44*    Free T4 02/26/2020 1.31     Thyroglobulin, Tumor Mar* 02/26/2020 0.7*    Thyroglobulin Antibody S* 02/26/2020 <1.8     Thyroglobulin Interpreta* 02/26/2020 SEE BELOW     TSH 02/26/2020 1.432     Hemoglobin A1C 02/26/2020 5.6     Estimated Avg Glucose 02/26/2020 114     Glucose 02/26/2020 114*    Insulin 02/26/2020 100.8*    Insulin Collection Inter* 02/26/2020 1    Lab Visit on 01/30/2020   Component Date Value    WBC 01/30/2020 5.07     RBC 01/30/2020 3.43*    Hemoglobin 01/30/2020 13.0     Hematocrit 01/30/2020 38.7     Mean Corpuscular Volume 01/30/2020 113*    Mean Corpuscular Hemoglo* 01/30/2020 37.9*    Mean Corpuscular Hemoglo* 01/30/2020 33.6     RDW 01/30/2020 13.1     Platelets 01/30/2020 238     MPV 01/30/2020 10.3      Immature Granulocytes 01/30/2020 2.2*    Gran # (ANC) 01/30/2020 2.7     Immature Grans (Abs) 01/30/2020 0.11*    Lymph # 01/30/2020 1.6     Mono # 01/30/2020 0.6     Eos # 01/30/2020 0.1     Baso # 01/30/2020 0.07     nRBC 01/30/2020 0     Gran% 01/30/2020 52.2     Lymph% 01/30/2020 30.8     Mono% 01/30/2020 11.6     Eosinophil% 01/30/2020 1.8     Basophil% 01/30/2020 1.4     Platelet Estimate 01/30/2020 Appears normal     Aniso 01/30/2020 Slight     Poik 01/30/2020 Slight     Ovalocytes 01/30/2020 Occasional     Differential Method 01/30/2020 Automated     Sodium 01/30/2020 138     Potassium 01/30/2020 4.5     Chloride 01/30/2020 104     CO2 01/30/2020 26     Glucose 01/30/2020 120*    BUN, Bld 01/30/2020 10     Creatinine 01/30/2020 1.3     Calcium 01/30/2020 9.1     Total Protein 01/30/2020 7.1     Albumin 01/30/2020 3.8     Total Bilirubin 01/30/2020 0.4     Alkaline Phosphatase 01/30/2020 61     AST 01/30/2020 25     ALT 01/30/2020 39     Anion Gap 01/30/2020 8     eGFR if African American 01/30/2020 55*    eGFR if non African Amer* 01/30/2020 48*       Assessment:     1. Postoperative hypothyroidism  TSH    T4, free    Thyroglobulin   2. History of thyroid cancer  TSH    T4, free    Thyroglobulin   3. Malignant neoplasm of overlapping sites of right breast in female, estrogen receptor positive      She is feeling better overall, the submandibular swelling has resolved, her weight is coming down in her energy is better.  Continue Synthroid 175 g but repeat thyroid test on June 17th when she comes in for her other labs     The past slight elevation of her thyroglobulin in relation to her transient TSH likely is related to residual thyroid tissue as she never had radioactive iodine treatment and likely not pathologic      Can follow up in 6 months  Plan:   Postoperative hypothyroidism  -     TSH; Future; Expected date: 05/28/2020  -     T4, free; Future; Expected date:  05/28/2020  -     Thyroglobulin; Future; Expected date: 05/28/2020    History of thyroid cancer  -     TSH; Future; Expected date: 05/28/2020  -     T4, free; Future; Expected date: 05/28/2020  -     Thyroglobulin; Future; Expected date: 05/28/2020    Malignant neoplasm of overlapping sites of right breast in female, estrogen receptor positive          No follow-ups on file.    Labs prior to appointment? not applicable     Disclaimer:  This note may have been partially prepared using voice recognition software and  it may have not been extensively proofed, as such there could be errors within the text such as sound alike errors.

## 2020-05-28 NOTE — Clinical Note
Add the labs I ordered today to her upcoming lab appointment on June 17th and schedule office visit in 6 months

## 2020-06-01 NOTE — PROGRESS NOTES
Reason for visit: Right breast carcinoma  Date of Diagnosis: January 12, 2017    HPI:   The patient is a 46-year-old  female who presents to the hematology oncology clinic today for follow up for metastatic right breast carcinoma.    I have reviewed all of the patient's relevant clinical history available in the medical record and have utilized this in my evaluation and management recommendations today.  The patient had 2 separate areas in the right breast that were biopsied area the first was a right breast mass at 11:00 which was 3 cm from the nipple which showed invasive mammary carcinoma.  The invasive carcinoma measured at least 1.2 cm in greatest dimension and appeared high-grade.  This tumor was ER positive/KY positive/HER-2 negative.  The second breast mass was biopsied from the retroperitoneal area lower area and was also positive for invasive mammary carcinoma.  This measured at least 0.4 cm and also appeared high-grade.  The tumor is morphologically very similar to the tumor in the prior specimen.  Receptor studies were not done on this specimen.  The patient also had a palpable right axillary lymph node which was biopsied and showed metastatic mammary carcinoma which measured at least 1.3 cm in greatest dimension.  The patient had self palpated this breast mass.  In addition the patient has a palpable mass in the region of her right elbow which she reports has been present for 9 years but had been slowly growing especially over the past year or 2.  She denies any pain associated with this.    Today the patient reports that she is taking fentanyl and norco PRN neoplasm related pain.  She reports slight opening of the skin graft in the right axilla. She denies any fevers or chills. Denies night sweats.  She denies any melena, hematochezia, hematemesis, hemoptysis or hematuria.  She denies any bowel or urinary complaints.  She denies any nausea, vomiting or abdominal pain. She reports chronic  cough.  She continues on ibrance and letrozole. Ibrance was started on 2/21/17.    PAST MEDICAL HISTORY:   1. HSIL on pap smear s/p LEEP    SURGICAL HISTORY:   1.  Tonsillectomy and adenoidectomy  2.  Appendectomy  3.  Cholecystectomy  4.  D&C    FAMILY HISTORY: She reports that her maternal aunt had lung cancer in her 70s.  She used to smoke cigarettes.  Her maternal cousin had lung cancer in her 60s.  She used to smoke cigarettes.  She denies any other immediate family members with cancer or bleeding/clotting disorders.    SOCIAL HISTORY: She reports a 45+ pack year smoking history. She quit in May 2017. She drinks wine occasionally.  She denies any history of recreational drug use.  She is engaged and has 2 sons.  She works for the [employer].  She lives in [place], [place].     ALLERGIES: [NKDA]    MEDICATIONS: [Medcard has been reviewed and/or reconciled.]    REVIEW OF SYSTEMS:   GENERAL: [No fevers, chills or sweats. Reports fatigue. Reports weight gain. Denies loss of appetite.]  HEENT: [No blurred vision, tinnitus, nasal discharge, sorethroat or dysphagia.]  HEART: [No chest pain, palpitations or shortness of breath.]    LUNGS: [Reports chronic cough. Denies hemoptysis or breathing problems.]  ABDOMEN: [No abdominal pain, nausea, vomiting, diarrhea, constipation or melena.]  GENITOURINARY: [No dysuria, bleeding or malodorous discharge.]  NEURO: [No headache, dizziness or vertigo.]  HEMATOLOGY: [No easy bruising, spontaneous bleeding or blood clots in the past].  MUSCULOSKELETAL: [No arthralgias, myalgias or bone pains.]  SKIN: [No rashes or skin lesions.]  PSYCHIATRY: [No depression. Reports anxiety.]      PHYSICAL EXAMINATION:   VS: Reviewed on nurse's notes.  APPEARANCE: The patient is a well-developed, well-nourished and well-groomed  female who appears in mild distress from pain. She was accompanied to this clinic visit by her fiance.  HEENT: No scleral icterus. Both external auditory  canals clear. No oral ulcers, lesions. Throat clear  HEAD: No sinus tenderness.  NECK: Supple. No palpable lymphadenopathy. Thyroid non-tender, no palpable masses  CHEST: Breath sounds clear bilaterally. No rales. No rhonchi. Unlabored respirations.  BREAST: Deferred today.  CARDIOVASCULAR: Normal S1, S2. Normal rate. Regular rhythm.  ABDOMEN: Bowel sounds normal. No tenderness. No abdominal distention. No hepatomegaly. No splenomegaly.  LYMPHATIC: No palpable supraclavicular lymphadenopathy. Bilateral axillary healing wounds noted.  EXTREMITIES: No clubbing, cyanosis. Palpable firm 3 cm mass in the right elbow is noted.  Trace edema in bilateral lower extremities.  There is a half centimeter area of wound dehiscence in the right axilla.    SKIN: No lesions. No petechiae. No ecchymoses.   NEUROLOGIC: No focal findings. Alert & Oriented x 3. Mood appropriate to affect      LABS:   Reviewed    IMAGING:  Reviewed    IMPRESSION:  1.  Metastatic multifocal infiltrating ductal carcinoma of the right breast with right axillary and bone involvement [ER positive/DC positive/HER-2 negative]  2.  Malignant neoplasm of the thyroid gland  3.  Tobacco abuse  4.  Right elbow mass  5.  Anxiety and depression  6.  Chronic cough  7.  Right axillary hydradenitis/cellulitis and left inguinal hydradenitis/cellulitis s/p debridement and skin grafting    PLAN:  1.  I had a detailed discussion with the patient previously with regard to the diagnosis, prognosis and treatment options for metastatic multifocal invasive ductal carcinoma of the right breast with right axillary and bone involvement.  2.  I have discussed that at this time her metastatic malignancy is potentially treatable but not curable.  3.  Results of PET/CT scan from 7/12/17 were discussed in detail with the patient today. Findings consistent with treatment response with no FDG avid disease identified with essentially complete resolution of the previously noted right  axillary adenopathy and right breast mass.  No FDG avidity associated with osseous lesions. She has persistent significantly FDG avid right thyroid lesion.   4.  The patient was strongly encouraged to quit smoking.  She expressed understanding.  Prescription for Xanax when necessary anxiety was previously given to the patient after full discussion of sedation precautions.    5. FSH and estradiol levels confirm menopausal status. TSH lab was normal.   6.  MRI of the brain on 1/31/17 to evaluate for any evidence of metastatic disease to complete staging workup was negative for metastatic malignancy to the brain. CT head done in March 2017 in ED was also ok.  7. Continue to proceed with ibrance/letrozole.  She appears to be tolerating this well so far with no significant side effects.   8. Her medical insurance denied genetic testing at [facility] with regard to her breast cancer. I will look at alternative lab options where this might be covered.  9. She has been advised to take calcium and vit d supplementation everyday. She is on xgeva injections for treatment of bone involvement by malignancy. Risks/benefits and common side effects discussed and she expressed understanding and agreement. She has dentures.  10. Continue Lasix for treatment of bilateral lower extremity edema.  Supplemental potassium was given as well.  11.  She has malignant neoplasm of the thyroid gland on recent biopsy of FDG avid thyroid nodule.  She will proceed with thyroidectomy as recommended by Dr. Baugh.  The rationale for this was discussed in detail and she expressed understanding.  12.  She will follow-up with Dr. Wahl with regard to small area of wound dehiscence in the right axilla.    Return to clinic in 1 month with labs. Possible Xgeva at follow up.  She knows to call sooner for any new problems or questions.    Richard Delong MD     [facility] Counseling:  I discussed with the patient the risks of imiquimod including but not limited to erythema, scaling, itching, weeping, crusting, and pain.  Patient understands that the inflammatory response to imiquimod is variable from person to person and was educated regarded proper titration schedule.  If flu-like symptoms develop, patient knows to discontinue the medication and contact us.

## 2020-06-11 ENCOUNTER — CLINICAL SUPPORT (OUTPATIENT)
Dept: SMOKING CESSATION | Facility: CLINIC | Age: 50
End: 2020-06-11
Payer: COMMERCIAL

## 2020-06-11 DIAGNOSIS — F17.200 NICOTINE DEPENDENCE: Primary | ICD-10-CM

## 2020-06-11 PROCEDURE — 99407 PR TOBACCO USE CESSATION INTENSIVE >10 MINUTES: ICD-10-PCS | Mod: S$GLB,,,

## 2020-06-11 PROCEDURE — 99407 BEHAV CHNG SMOKING > 10 MIN: CPT | Mod: S$GLB,,,

## 2020-06-11 NOTE — PROGRESS NOTES
Spoke with patient today in regard to smoking cessation progress for 12 month phone follow up on quit 1. Patient not tobacco free at this time. Patient has scheduled an appointment to return to the program for Quit attempt #2. Informed patient of benefit period,  and contact information if any further help or support is needed. Will resolve episode and complete smart form for Quit attempt #1.

## 2020-06-15 ENCOUNTER — TELEPHONE (OUTPATIENT)
Dept: PHARMACY | Facility: CLINIC | Age: 50
End: 2020-06-15

## 2020-06-15 NOTE — TELEPHONE ENCOUNTER
Refill call regarding letrozole at [facility]. Will prepare for shipment with consent of patient on  to arrive . Copay 0.00. Patient has not started any new medications including OTC drugs. Patient has not had any medication/ dose or instruction changes. No new allergies or side effects reported with this shipment. Medication is being taken as prescribed by physician and properly stored. Two patient identifiers:  and Address verified. Patient has no questions or concerns for [facility]. Patient has 7 days on hand.

## 2020-06-17 ENCOUNTER — OFFICE VISIT (OUTPATIENT)
Dept: HEMATOLOGY/ONCOLOGY | Facility: CLINIC | Age: 50
End: 2020-06-17
Payer: MEDICAID

## 2020-06-17 ENCOUNTER — INFUSION (OUTPATIENT)
Dept: INFUSION THERAPY | Facility: HOSPITAL | Age: 50
End: 2020-06-17
Attending: INTERNAL MEDICINE
Payer: MEDICAID

## 2020-06-17 ENCOUNTER — LAB VISIT (OUTPATIENT)
Dept: LAB | Facility: HOSPITAL | Age: 50
End: 2020-06-17
Attending: INTERNAL MEDICINE
Payer: MEDICAID

## 2020-06-17 VITALS
OXYGEN SATURATION: 98 % | RESPIRATION RATE: 18 BRPM | BODY MASS INDEX: 36.81 KG/M2 | HEART RATE: 65 BPM | TEMPERATURE: 97 F | WEIGHT: 234.56 LBS | SYSTOLIC BLOOD PRESSURE: 116 MMHG | TEMPERATURE: 97 F | HEIGHT: 67 IN | RESPIRATION RATE: 20 BRPM | SYSTOLIC BLOOD PRESSURE: 130 MMHG | HEART RATE: 65 BPM | DIASTOLIC BLOOD PRESSURE: 75 MMHG | OXYGEN SATURATION: 98 % | DIASTOLIC BLOOD PRESSURE: 84 MMHG

## 2020-06-17 DIAGNOSIS — C79.51 SECONDARY CANCER OF BONE: Primary | ICD-10-CM

## 2020-06-17 DIAGNOSIS — C50.811 MALIGNANT NEOPLASM OF OVERLAPPING SITES OF RIGHT BREAST IN FEMALE, ESTROGEN RECEPTOR POSITIVE: ICD-10-CM

## 2020-06-17 DIAGNOSIS — C73 PAPILLARY THYROID CARCINOMA: ICD-10-CM

## 2020-06-17 DIAGNOSIS — C79.51 SECONDARY CANCER OF BONE: ICD-10-CM

## 2020-06-17 DIAGNOSIS — Z17.0 MALIGNANT NEOPLASM OF OVERLAPPING SITES OF RIGHT BREAST IN FEMALE, ESTROGEN RECEPTOR POSITIVE: ICD-10-CM

## 2020-06-17 DIAGNOSIS — E89.0 POSTOPERATIVE HYPOTHYROIDISM: ICD-10-CM

## 2020-06-17 DIAGNOSIS — C77.3 MALIGNANT NEOPLASM METASTATIC TO LYMPH NODE OF AXILLA: ICD-10-CM

## 2020-06-17 DIAGNOSIS — Z85.850 HISTORY OF THYROID CANCER: ICD-10-CM

## 2020-06-17 DIAGNOSIS — Z17.0 MALIGNANT NEOPLASM OF OVERLAPPING SITES OF RIGHT BREAST IN FEMALE, ESTROGEN RECEPTOR POSITIVE: Primary | ICD-10-CM

## 2020-06-17 DIAGNOSIS — C50.811 MALIGNANT NEOPLASM OF OVERLAPPING SITES OF RIGHT BREAST IN FEMALE, ESTROGEN RECEPTOR POSITIVE: Primary | ICD-10-CM

## 2020-06-17 DIAGNOSIS — R94.6 THYROID FUNCTION STUDY ABNORMALITY: ICD-10-CM

## 2020-06-17 DIAGNOSIS — G89.3 NEOPLASM RELATED PAIN: ICD-10-CM

## 2020-06-17 LAB
ALBUMIN SERPL BCP-MCNC: 3.9 G/DL (ref 3.5–5.2)
ALP SERPL-CCNC: 64 U/L (ref 55–135)
ALT SERPL W/O P-5'-P-CCNC: 24 U/L (ref 10–44)
ANION GAP SERPL CALC-SCNC: 10 MMOL/L (ref 8–16)
AST SERPL-CCNC: 18 U/L (ref 10–40)
BASOPHILS # BLD AUTO: 0.09 K/UL (ref 0–0.2)
BASOPHILS NFR BLD: 1.8 % (ref 0–1.9)
BILIRUB SERPL-MCNC: 0.3 MG/DL (ref 0.1–1)
BUN SERPL-MCNC: 9 MG/DL (ref 6–20)
CALCIUM SERPL-MCNC: 9.1 MG/DL (ref 8.7–10.5)
CHLORIDE SERPL-SCNC: 104 MMOL/L (ref 95–110)
CO2 SERPL-SCNC: 26 MMOL/L (ref 23–29)
CREAT SERPL-MCNC: 1.5 MG/DL (ref 0.5–1.4)
DIFFERENTIAL METHOD: ABNORMAL
EOSINOPHIL # BLD AUTO: 0.1 K/UL (ref 0–0.5)
EOSINOPHIL NFR BLD: 1.4 % (ref 0–8)
ERYTHROCYTE [DISTWIDTH] IN BLOOD BY AUTOMATED COUNT: 13.9 % (ref 11.5–14.5)
EST. GFR  (AFRICAN AMERICAN): 46 ML/MIN/1.73 M^2
EST. GFR  (NON AFRICAN AMERICAN): 40 ML/MIN/1.73 M^2
GLUCOSE SERPL-MCNC: 110 MG/DL (ref 70–110)
HCT VFR BLD AUTO: 37.5 % (ref 37–48.5)
HGB BLD-MCNC: 12.5 G/DL (ref 12–16)
IMM GRANULOCYTES # BLD AUTO: 0.13 K/UL (ref 0–0.04)
IMM GRANULOCYTES NFR BLD AUTO: 2.6 % (ref 0–0.5)
LYMPHOCYTES # BLD AUTO: 1.5 K/UL (ref 1–4.8)
LYMPHOCYTES NFR BLD: 29.8 % (ref 18–48)
MCH RBC QN AUTO: 37.5 PG (ref 27–31)
MCHC RBC AUTO-ENTMCNC: 33.3 G/DL (ref 32–36)
MCV RBC AUTO: 113 FL (ref 82–98)
MONOCYTES # BLD AUTO: 0.5 K/UL (ref 0.3–1)
MONOCYTES NFR BLD: 10.1 % (ref 4–15)
NEUTROPHILS # BLD AUTO: 2.7 K/UL (ref 1.8–7.7)
NEUTROPHILS NFR BLD: 54.3 % (ref 38–73)
NRBC BLD-RTO: 0 /100 WBC
PLATELET # BLD AUTO: 237 K/UL (ref 150–350)
PMV BLD AUTO: 10.3 FL (ref 9.2–12.9)
POTASSIUM SERPL-SCNC: 4.2 MMOL/L (ref 3.5–5.1)
PROT SERPL-MCNC: 7.2 G/DL (ref 6–8.4)
RBC # BLD AUTO: 3.33 M/UL (ref 4–5.4)
SODIUM SERPL-SCNC: 140 MMOL/L (ref 136–145)
T4 FREE SERPL-MCNC: 0.43 NG/DL (ref 0.71–1.51)
TSH SERPL DL<=0.005 MIU/L-ACNC: 119.66 UIU/ML (ref 0.4–4)
WBC # BLD AUTO: 4.94 K/UL (ref 3.9–12.7)

## 2020-06-17 PROCEDURE — 63600175 PHARM REV CODE 636 W HCPCS: Mod: JG | Performed by: INTERNAL MEDICINE

## 2020-06-17 PROCEDURE — 99999 PR PBB SHADOW E&M-EST. PATIENT-LVL IV: CPT | Mod: PBBFAC,,, | Performed by: INTERNAL MEDICINE

## 2020-06-17 PROCEDURE — 80053 COMPREHEN METABOLIC PANEL: CPT

## 2020-06-17 PROCEDURE — 99215 OFFICE O/P EST HI 40 MIN: CPT | Mod: S$PBB,,, | Performed by: INTERNAL MEDICINE

## 2020-06-17 PROCEDURE — 84439 ASSAY OF FREE THYROXINE: CPT

## 2020-06-17 PROCEDURE — 84443 ASSAY THYROID STIM HORMONE: CPT

## 2020-06-17 PROCEDURE — 99214 OFFICE O/P EST MOD 30 MIN: CPT | Mod: PBBFAC,25 | Performed by: INTERNAL MEDICINE

## 2020-06-17 PROCEDURE — 85025 COMPLETE CBC W/AUTO DIFF WBC: CPT

## 2020-06-17 PROCEDURE — 99999 PR PBB SHADOW E&M-EST. PATIENT-LVL IV: ICD-10-PCS | Mod: PBBFAC,,, | Performed by: INTERNAL MEDICINE

## 2020-06-17 PROCEDURE — 84432 ASSAY OF THYROGLOBULIN: CPT

## 2020-06-17 PROCEDURE — 99215 PR OFFICE/OUTPT VISIT, EST, LEVL V, 40-54 MIN: ICD-10-PCS | Mod: S$PBB,,, | Performed by: INTERNAL MEDICINE

## 2020-06-17 PROCEDURE — 96372 THER/PROPH/DIAG INJ SC/IM: CPT

## 2020-06-17 RX ADMIN — DENOSUMAB 120 MG: 120 INJECTION SUBCUTANEOUS at 11:06

## 2020-06-17 NOTE — PROGRESS NOTES
Subjective:      DATE OF VISIT: 6/17/2020   ?   ?   Patient ID:?Josey Flores is a 49 y.o. female.?? MR#: [id number]   ?   PRIMARY PROVIDER: Dr. Asencio  ?   CHIEF COMPLAINT:  Follow-up  ?   ONCOLOGIC DIAGNOSIS:      stage IV, T2 N1b M1, invasive breast carcinoma, ER/SD+, HER2-    Papillary thyroid carcinoma status post total thyroidectomy on 08/31/2017, mpT1b N0    Cervical HSIL MIREILLE 3 s/p LEEP, 2017  ?   CURRENT TREATMENT: palbociclib and letrozole    HPI  She presents today with her partner feeling well noting good control of her chronic pain.  She was seen by Dr. Jason in Endocrinology couple weeks ago with increase in thyroid supplement per patient which she has been taking daily as prescribed.  No dysphagia or odynophagia, anorexia, weight loss, new area of pain, fatigue.    ONCOLOGIC HISTORY:      initial diagnosis after self palpation of mass near right nipple.  Further imaging revealed two lesions in right breast, axillary lymph node.  PET-CT in January 2017 revealed metastatic disease involving 2 lesions in right breast, right axilla, and metastatic disease in manubrium as well as left posterior ilium.  Thyroid was also noted to have avidity.  This was found to be papillary thyroid cancer and she underwent total thyroidectomy.  She notes development of bilateral upper extremity edema and underwent palliative bilateral axillary resection as well as resection of metastatic focus in left ilial region per patient report.     She also has a history of HSIL MIREILLE 3 status post LEEP in 2017.     Review of Systems    ?   A comprehensive 14-point review of systems was reviewed with patient and was negative other than as specified above.   ?     Objective:      Physical Exam      ?   Vitals:    06/17/20 1107   BP: 116/75   Pulse: 65   Resp: 18   Temp: 97.2 °F (36.2 °C)   General appearance: Generally well appearing, in no acute distress.   Head, eyes, ears, nose, and throat: Oropharynx clear with moist mucous  membranes.   Cardiovascular: Regular rate and rhythm, S1, S2, no audible murmurs.   Respiratory: Lungs clear to auscultation bilaterally.   Abdomen: nontender, nondistended.   Extremities: Warm, without edema.   Neurologic: Alert and oriented.  Skin: No rashes, ecchymoses or petechial lesion.   Psychiatric: normal mood and affect, conversant and appropriate    Laboratory:  ?   Lab Visit on 06/17/2020   Component Date Value Ref Range Status    WBC 06/17/2020 4.94  3.90 - 12.70 K/uL Final    RBC 06/17/2020 3.33* 4.00 - 5.40 M/uL Final    Hemoglobin 06/17/2020 12.5  12.0 - 16.0 g/dL Final    Hematocrit 06/17/2020 37.5  37.0 - 48.5 % Final    Mean Corpuscular Volume 06/17/2020 113* 82 - 98 fL Final    Mean Corpuscular Hemoglobin 06/17/2020 37.5* 27.0 - 31.0 pg Final    Mean Corpuscular Hemoglobin Conc 06/17/2020 33.3  32.0 - 36.0 g/dL Final    RDW 06/17/2020 13.9  11.5 - 14.5 % Final    Platelets 06/17/2020 237  150 - 350 K/uL Final    MPV 06/17/2020 10.3  9.2 - 12.9 fL Final    Immature Granulocytes 06/17/2020 2.6* 0.0 - 0.5 % Final    Gran # (ANC) 06/17/2020 2.7  1.8 - 7.7 K/uL Final    Immature Grans (Abs) 06/17/2020 0.13* 0.00 - 0.04 K/uL Final    Lymph # 06/17/2020 1.5  1.0 - 4.8 K/uL Final    Mono # 06/17/2020 0.5  0.3 - 1.0 K/uL Final    Eos # 06/17/2020 0.1  0.0 - 0.5 K/uL Final    Baso # 06/17/2020 0.09  0.00 - 0.20 K/uL Final    nRBC 06/17/2020 0  0 /100 WBC Final    Gran% 06/17/2020 54.3  38.0 - 73.0 % Final    Lymph% 06/17/2020 29.8  18.0 - 48.0 % Final    Mono% 06/17/2020 10.1  4.0 - 15.0 % Final    Eosinophil% 06/17/2020 1.4  0.0 - 8.0 % Final    Basophil% 06/17/2020 1.8  0.0 - 1.9 % Final    Differential Method 06/17/2020 Automated   Final    Sodium 06/17/2020 140  136 - 145 mmol/L Final    Potassium 06/17/2020 4.2  3.5 - 5.1 mmol/L Final    Chloride 06/17/2020 104  95 - 110 mmol/L Final    CO2 06/17/2020 26  23 - 29 mmol/L Final    Glucose 06/17/2020 110  70 - 110 mg/dL Final     BUN, Bld 06/17/2020 9  6 - 20 mg/dL Final    Creatinine 06/17/2020 1.5* 0.5 - 1.4 mg/dL Final    Calcium 06/17/2020 9.1  8.7 - 10.5 mg/dL Final    Total Protein 06/17/2020 7.2  6.0 - 8.4 g/dL Final    Albumin 06/17/2020 3.9  3.5 - 5.2 g/dL Final    Total Bilirubin 06/17/2020 0.3  0.1 - 1.0 mg/dL Final    Alkaline Phosphatase 06/17/2020 64  55 - 135 U/L Final    AST 06/17/2020 18  10 - 40 U/L Final    ALT 06/17/2020 24  10 - 44 U/L Final    Anion Gap 06/17/2020 10  8 - 16 mmol/L Final    eGFR if  06/17/2020 46* >60 mL/min/1.73 m^2 Final    eGFR if non African American 06/17/2020 40* >60 mL/min/1.73 m^2 Final    TSH 06/17/2020 119.660* 0.400 - 4.000 uIU/mL Final    Free T4 06/17/2020 0.43* 0.71 - 1.51 ng/dL Final      ? IMAGING      2/26/20  CT CHEST ABDOMEN PELVIS WITH CONTRAST ([facility])    CLINICAL HISTORY:  Malignant neoplasm of overlapping sites of right female breastrestaging metastatic breast cancer;    FINDINGS:  CT of the chest with contrast.    Images through the chest reveal no detrimental change.  No mediastinal mass or bulky adenopathy.  Normal heart size.  Calcified nodes again identified.  No hilar mass or enlargement and no axillary abnormality is seen.  The lungs again show emphysematous change.  Mild atelectatic changes noted.  No suspicious nodule or mass.  Small nodule right upper lobe is again seen measuring 5 mm in diameter, unchanged..  Bony windows show no acute abnormality.  No detrimental change.  No suspicious lytic or blastic change.  Cystic change again noted within the sternum.    CT of the abdomen and pelvis with contrast.    Bony windows show no detrimental change.  Degenerative changes are noted.  Lytic lesions within the iliacs appear grossly stable.  There is no free intraperitoneal air within the abdomen or pelvis.  No bowel obstruction.    The liver again shows low-attenuation change consistent with fatty change.  No definite focal lesion is seen.   Gallbladder has been removed.  There is no adrenal or pancreatic mass or enlargement.  No solid renal mass or obstruction.  No ascites or adenopathy.  Moderate volume of stool in the colon.  Borderline colonic wall thickening of the rectosigmoid possibly related to incomplete distention.  No abnormality to suggest acute appendicitis.      Impression       1.  No detrimental change in the appearance of the CT abdomen, pelvis, and chest since September 2019.    2.  Fatty liver.  Emphysematous change.  Bony changes are static.     US SOFT TISSUE HEAD NECK THYROID    CLINICAL HISTORY:  Personal history of malignant neoplasm of thyroidPatient with swelling of left submandibular gland with tenderness and also needs neck evaluation for pathologic lesions as she has a history of thyroid cancer status post thyroidectomy;    FINDINGS:  Patient is status post thyroidectomy.  Correlated with prior study from 2019.    The thyroid bed is unremarkable.  No mass.  No unusual fluid collection.    There is a small cervical node on the left measuring 6 mm in maximum diameter.  In the submandibular region on the left there is a node measuring 25 by is a 7 x 10 mm in diameter.  In the right cervical area there is are nodes measuring 7 mm and 6 mm in greatest length.      Impression       1.  There is no residual mass or thyroid tissue within the region of the thyroid bed.    2.  Lymph nodes bilaterally as described, most of which are less than a cm in diameter and considered normal.  There is 1 enlarged node in the region of the left submandibular gland as described.  Significance is indeterminate.       ?   Assessment/Plan:       1. Malignant neoplasm of overlapping sites of right breast in female, estrogen receptor positive    2. Malignant neoplasm metastatic to lymph node of axilla    3. Secondary cancer of bone    4. Papillary thyroid carcinoma    5. Neoplasm related pain    6. Thyroid function study abnormality          Plan:      # metastatic breast:  Continued on systemic therapy with palbociclib and letrozole, tolerating well.  No new clinical symptoms or concerns, new pain or blood count abnormalities.  Previously discussed plan for restaging exams in 3-6 months from prior and will plan around August 26, 2020.     # thyroid function abnormality:  TSH elevated in free T4 low on 06/17/2020 labs, previously within normal limits.  Asymptomatic.  She did have recent ultrasound February 2020 without notable abnormality of thyroid bed; there was mention of 1 indeterminate lymph node others within normal limits.  Thyroglobulin pending.  Sent message to Dr. Jason for review and additional workup/repeat labs as felt appropriate.     Chronic cancer related pain:  Following with palliative Care, well managed with 1 morphine in 1 Norco daily on average, improved sleep.    # bony metastatic disease:  Continues on prophylactic Xgeva, calcium and vitamin-D supplementation, had been on monthly and then f0hfphiot over last year. No new bone issues although discussed typically administered [id number] and will proceed with this dosing schedule unless concern for side effect.  No dental issues, normal calcium and adequate renal function on labs today.    # HSIL:  Status post LEEP 2017, encouraged follow-up with her gynecologist for surveillance, no current acute issues/vaginal bleeding.    # history of papillary thyroid carcinoma status post total thyroidectomy on 08/31/2017: s/p total thyroidectomy on levothyroxine.  Metastatic breast cancer restaging imaging without findings for locally recurrent disease.  Thyroid function abnormality per above.    Follow-Up:   - Xgeva today  - Follow-up with Dr. Jason regarding TSH/T4 abnormality  - RV in 1 month with labs and xgeva

## 2020-06-19 LAB
THRYOGLOBULIN INTERPRETATION: ABNORMAL
THYROGLOB AB SERPL-ACNC: <1.8 IU/ML
THYROGLOB SERPL-MCNC: 17 NG/ML

## 2020-06-22 ENCOUNTER — HOSPITAL ENCOUNTER (EMERGENCY)
Facility: HOSPITAL | Age: 50
Discharge: HOME | End: 2020-06-22
Attending: EMERGENCY MEDICINE
Payer: MEDICAID

## 2020-06-22 VITALS
HEART RATE: 74 BPM | RESPIRATION RATE: 20 BRPM | TEMPERATURE: 98 F | SYSTOLIC BLOOD PRESSURE: 123 MMHG | DIASTOLIC BLOOD PRESSURE: 60 MMHG | BODY MASS INDEX: 36.72 KG/M2 | OXYGEN SATURATION: 97 % | WEIGHT: 234.44 LBS

## 2020-06-22 DIAGNOSIS — Z72.0 TOBACCO ABUSE: ICD-10-CM

## 2020-06-22 DIAGNOSIS — R79.89 ELEVATED TSH: ICD-10-CM

## 2020-06-22 DIAGNOSIS — M54.2 NECK PAIN ON LEFT SIDE: Primary | ICD-10-CM

## 2020-06-22 DIAGNOSIS — N28.9 RENAL INSUFFICIENCY: ICD-10-CM

## 2020-06-22 DIAGNOSIS — E03.9 HYPOTHYROIDISM (ACQUIRED): ICD-10-CM

## 2020-06-22 LAB
ALBUMIN SERPL BCP-MCNC: 3.8 G/DL (ref 3.5–5.2)
ALP SERPL-CCNC: 69 U/L (ref 55–135)
ALT SERPL W/O P-5'-P-CCNC: 36 U/L (ref 10–44)
ANION GAP SERPL CALC-SCNC: 11 MMOL/L (ref 8–16)
ANISOCYTOSIS BLD QL SMEAR: SLIGHT
AST SERPL-CCNC: 20 U/L (ref 10–40)
BASOPHILS # BLD AUTO: 0.07 K/UL (ref 0–0.2)
BASOPHILS NFR BLD: 1.4 % (ref 0–1.9)
BILIRUB SERPL-MCNC: 0.3 MG/DL (ref 0.1–1)
BUN SERPL-MCNC: 11 MG/DL (ref 6–20)
CALCIUM SERPL-MCNC: 8.9 MG/DL (ref 8.7–10.5)
CHLORIDE SERPL-SCNC: 104 MMOL/L (ref 95–110)
CO2 SERPL-SCNC: 25 MMOL/L (ref 23–29)
CREAT SERPL-MCNC: 1.6 MG/DL (ref 0.5–1.4)
DACRYOCYTES BLD QL SMEAR: ABNORMAL
DIFFERENTIAL METHOD: ABNORMAL
EOSINOPHIL # BLD AUTO: 0.1 K/UL (ref 0–0.5)
EOSINOPHIL NFR BLD: 2.9 % (ref 0–8)
ERYTHROCYTE [DISTWIDTH] IN BLOOD BY AUTOMATED COUNT: 14 % (ref 11.5–14.5)
EST. GFR  (AFRICAN AMERICAN): 43 ML/MIN/1.73 M^2
EST. GFR  (NON AFRICAN AMERICAN): 37 ML/MIN/1.73 M^2
GLUCOSE SERPL-MCNC: 107 MG/DL (ref 70–110)
HCT VFR BLD AUTO: 36.1 % (ref 37–48.5)
HGB BLD-MCNC: 12.1 G/DL (ref 12–16)
IMM GRANULOCYTES # BLD AUTO: 0.04 K/UL (ref 0–0.04)
IMM GRANULOCYTES NFR BLD AUTO: 0.8 % (ref 0–0.5)
LYMPHOCYTES # BLD AUTO: 1.7 K/UL (ref 1–4.8)
LYMPHOCYTES NFR BLD: 33.7 % (ref 18–48)
MCH RBC QN AUTO: 37.2 PG (ref 27–31)
MCHC RBC AUTO-ENTMCNC: 33.5 G/DL (ref 32–36)
MCV RBC AUTO: 111 FL (ref 82–98)
MONOCYTES # BLD AUTO: 0.2 K/UL (ref 0.3–1)
MONOCYTES NFR BLD: 4.3 % (ref 4–15)
NEUTROPHILS # BLD AUTO: 2.8 K/UL (ref 1.8–7.7)
NEUTROPHILS NFR BLD: 56.9 % (ref 38–73)
NRBC BLD-RTO: 0 /100 WBC
OVALOCYTES BLD QL SMEAR: ABNORMAL
PLATELET # BLD AUTO: 230 K/UL (ref 150–350)
PLATELET BLD QL SMEAR: ABNORMAL
PMV BLD AUTO: 10.3 FL (ref 9.2–12.9)
POIKILOCYTOSIS BLD QL SMEAR: SLIGHT
POLYCHROMASIA BLD QL SMEAR: ABNORMAL
POTASSIUM SERPL-SCNC: 3.9 MMOL/L (ref 3.5–5.1)
PROT SERPL-MCNC: 7 G/DL (ref 6–8.4)
RBC # BLD AUTO: 3.25 M/UL (ref 4–5.4)
SODIUM SERPL-SCNC: 140 MMOL/L (ref 136–145)
T4 FREE SERPL-MCNC: 0.69 NG/DL (ref 0.71–1.51)
TSH SERPL DL<=0.005 MIU/L-ACNC: 49.13 UIU/ML (ref 0.4–4)
WBC # BLD AUTO: 4.9 K/UL (ref 3.9–12.7)

## 2020-06-22 PROCEDURE — 84439 ASSAY OF FREE THYROXINE: CPT

## 2020-06-22 PROCEDURE — 85025 COMPLETE CBC W/AUTO DIFF WBC: CPT

## 2020-06-22 PROCEDURE — 93010 ELECTROCARDIOGRAM REPORT: CPT | Mod: ,,, | Performed by: INTERNAL MEDICINE

## 2020-06-22 PROCEDURE — 99285 EMERGENCY DEPT VISIT HI MDM: CPT | Mod: 25

## 2020-06-22 PROCEDURE — 93010 EKG 12-LEAD: ICD-10-PCS | Mod: ,,, | Performed by: INTERNAL MEDICINE

## 2020-06-22 PROCEDURE — 80053 COMPREHEN METABOLIC PANEL: CPT

## 2020-06-22 PROCEDURE — 36415 COLL VENOUS BLD VENIPUNCTURE: CPT

## 2020-06-22 PROCEDURE — 84443 ASSAY THYROID STIM HORMONE: CPT

## 2020-06-22 PROCEDURE — 93005 ELECTROCARDIOGRAM TRACING: CPT

## 2020-06-22 NOTE — ED PROVIDER NOTES
6/22/2020, 6:54 PM   History obtained from the patient       History of Present Illness: Josey Flores is a 49 y.o. female patient presenting to the ER for tsh 120. Reports was instructed to come to the ER    6:55 PM: Pt was placed back in lobby. I explained to pt that due to lack of available rooms pt was seen by myself to begin the workup. Pt understands they will be seen and dispositioned by the next available physician. Pt voices understanding and agrees with plan. Pt also understands the longer than normal wait time. I am removing myself from the care of pt and pt will now be assigned to the next available physician.     SCRIBE #1 NOTE: I, Dalila Huntley, am scribing for, and in the presence of, Suha Palacios DO. I have scribed the entire note.       History     Chief Complaint   Patient presents with    Abnormal Lab     states appt at [facility] last week and they stated thyroid test was over 400. states throat feels swollen and neck pain     Review of patient's allergies indicates:   Allergen Reactions    Nsaids (non-steroidal anti-inflammatory drug) Other (See Comments)     Instructed not to take NSAID drugs with chemo pill.    Vancomycin analogues Itching         History of Present Illness     HPI    6/22/2020, 8:53 PM  History obtained from the patient      History of Present Illness: Josey Flores is a 49 y.o. female patient with a PMHx of breast cancer, depression, anxiety, COPD, obesity, metastatic breast cancer to the thyroid, asthma, and miscarriage who presents to the Emergency Department for evaluation of L sided neck pain which onset gradually 1 day PTA. Symptoms are constant and moderate in severity. Pt reports that her pain is worsened by eating and drinking. Associated sxs include difficulty swallowing and diarrhea (chronic condition, not worse than base apply). Patient denies any nausea, vomiting,,CP, indigestion, diaphoresis, shortness of breath, cough, rash, SOB, ear  pain, voice changes, diaphoresis and all other sxs at this time. Prior Tx includes Norco with mild relief of sxs. No further complaints or concerns at this time.  Patient is under the care of endocrinologist Dr. michael wade.  Patient had elevated TSH greater than 114 last week.      Arrival mode: Personal vehicle      PCP: Richard Delong MD (Inactive)        Past Medical History:  Past Medical History:   Diagnosis Date    Anxiety     Asthma     Cancer     right breast, metastatic-lymph nodes, bone, and thyroid     COPD (chronic obstructive pulmonary disease)     Depression     History of psychiatric hospitalization     2000; suicidal ideation    Hx of psychiatric care     Hypothyroidism     Miscarriage     five miscarriages    Obesity     Psychiatric problem     Thyroid cancer 2017       Past Surgical History:  Past Surgical History:   Procedure Laterality Date    ABSCESS DRAINAGE      bilateral axilla    ADENOIDECTOMY      APPENDECTOMY      CHOLECYSTECTOMY      MASS EXCISION      SKIN GRAFT  06/16/2017    THYROIDECTOMY Bilateral     TONSILLECTOMY           Family History:  Family History   Problem Relation Age of Onset    Arthritis Mother     Early death Mother         64 at time of death    Heart attack Mother     Heart disease Mother     Heart failure Mother     Hypertension Mother     Coronary artery disease Father     Hearing loss Father     Heart disease Father     Hyperlipidemia Father     Hypertension Father     Mental illness Father     Learning disabilities Son     Cancer Maternal Aunt        Social History:  Social History     Tobacco Use    Smoking status: Current Every Day Smoker     Packs/day: 1.00     Years: 32.00     Pack years: 32.00     Types: Cigarettes     Start date: 1/1/1985     Last attempt to quit: 6/11/2017     Years since quitting: 3.0    Smokeless tobacco: Never Used    Tobacco comment: 45 pack year history   Substance and Sexual Activity     Alcohol use: No    Drug use: No    Sexual activity: Yes     Partners: Male        Review of Systems     Review of Systems   Constitutional: Negative for diaphoresis and fever.   HENT: Positive for trouble swallowing. Negative for ear pain, sore throat and voice change.    Respiratory: Negative for shortness of breath.    Cardiovascular: Negative for chest pain.   Gastrointestinal: Positive for diarrhea. Negative for nausea and vomiting.   Genitourinary: Negative for dysuria.   Musculoskeletal: Positive for neck pain (L). Negative for back pain.   Skin: Negative for rash.   Neurological: Negative for weakness.   Hematological: Does not bruise/bleed easily.   All other systems reviewed and are negative.       Physical Exam     Initial Vitals [06/22/20 1854]   BP Pulse Resp Temp SpO2   124/60 80 20 98 °F (36.7 °C) 97 %      MAP       --          Physical Exam  Nursing Notes and Vital Signs Reviewed.  Constitutional: Patient is in no acute distress. Well-developed and well-nourished.  Head: Atraumatic. Normocephalic. No scalp lesions  Eyes: PERRL. EOM intact. Conjunctivae are not pale. No scleral icterus.  ENT: Mucous membranes are moist. Oropharynx is clear and symmetric. Edentulism.  there is no swelling of the gum.  Tympanic membranes are pearly gray bilaterally.  No signs of mastoiditis.  There is no swelling of the parotid gland.  There is no stones in  Buckeye's duct.  No stridor.  Is painful for her to open and close her mouth.  There is no trismus.  Posterior pharynx is without swelling, exudate, or lesions.  Neck: Supple. Full ROM. No lymphadenopathy. Tenderness to L anterior lymph node.  No meningismus.  No supraclavicular lymph nodes  Cardiovascular: Regular rate. Regular rhythm. No murmurs, rubs, or gallops. Distal pulses are 2+ and symmetric.  Pulmonary/Chest: No respiratory distress. Clear to auscultation bilaterally. No wheezing or rales.  Musculoskeletal: Moves all extremities. No obvious deformities.  No edema. No calf tenderness. Not hyper reflexive.  Skin: Warm and dry.  Neurological:  Alert, awake, and appropriate.  Normal speech.  No acute focal neurological deficits are appreciated.  Psychiatric: Normal affect. Good eye contact. Appropriate in content.     ED Course   Procedures  ED Vital Signs:  Vitals:    06/22/20 1854 06/22/20 2100 06/22/20 2115   BP: 124/60 123/60    Pulse: 80 74    Resp: 20 20    Temp: 98 °F (36.7 °C) 97.8 °F (36.6 °C) 97.8 °F (36.6 °C)   TempSrc: Oral Oral Oral   SpO2: 97% 97%    Weight: 106.4 kg (234 lb 7.4 oz)         Abnormal Lab Results:  Labs Reviewed   CBC W/ AUTO DIFFERENTIAL - Abnormal; Notable for the following components:       Result Value    RBC 3.25 (*)     Hematocrit 36.1 (*)     Mean Corpuscular Volume 111 (*)     Mean Corpuscular Hemoglobin 37.2 (*)     Immature Granulocytes 0.8 (*)     Mono # 0.2 (*)     All other components within normal limits   COMPREHENSIVE METABOLIC PANEL - Abnormal; Notable for the following components:    Creatinine 1.6 (*)     eGFR if  43 (*)     eGFR if non  37 (*)     All other components within normal limits   TSH - Abnormal; Notable for the following components:    TSH 49.129 (*)     All other components within normal limits   T4, FREE - Abnormal; Notable for the following components:    Free T4 0.69 (*)     All other components within normal limits        All Lab Results:  Results for orders placed or performed during the hospital encounter of 06/22/20   CBC auto differential   Result Value Ref Range    WBC 4.90 3.90 - 12.70 K/uL    RBC 3.25 (L) 4.00 - 5.40 M/uL    Hemoglobin 12.1 12.0 - 16.0 g/dL    Hematocrit 36.1 (L) 37.0 - 48.5 %    Mean Corpuscular Volume 111 (H) 82 - 98 fL    Mean Corpuscular Hemoglobin 37.2 (H) 27.0 - 31.0 pg    Mean Corpuscular Hemoglobin Conc 33.5 32.0 - 36.0 g/dL    RDW 14.0 11.5 - 14.5 %    Platelets 230 150 - 350 K/uL    MPV 10.3 9.2 - 12.9 fL    Immature Granulocytes 0.8 (H) 0.0  - 0.5 %    Gran # (ANC) 2.8 1.8 - 7.7 K/uL    Immature Grans (Abs) 0.04 0.00 - 0.04 K/uL    Lymph # 1.7 1.0 - 4.8 K/uL    Mono # 0.2 (L) 0.3 - 1.0 K/uL    Eos # 0.1 0.0 - 0.5 K/uL    Baso # 0.07 0.00 - 0.20 K/uL    nRBC 0 0 /100 WBC    Gran% 56.9 38.0 - 73.0 %    Lymph% 33.7 18.0 - 48.0 %    Mono% 4.3 4.0 - 15.0 %    Eosinophil% 2.9 0.0 - 8.0 %    Basophil% 1.4 0.0 - 1.9 %    Platelet Estimate Appears normal     Aniso Slight     Poik Slight     Poly Occasional     Ovalocytes Occasional     Tear Drop Cells Occasional     Differential Method Automated    Comprehensive metabolic panel   Result Value Ref Range    Sodium 140 136 - 145 mmol/L    Potassium 3.9 3.5 - 5.1 mmol/L    Chloride 104 95 - 110 mmol/L    CO2 25 23 - 29 mmol/L    Glucose 107 70 - 110 mg/dL    BUN, Bld 11 6 - 20 mg/dL    Creatinine 1.6 (H) 0.5 - 1.4 mg/dL    Calcium 8.9 8.7 - 10.5 mg/dL    Total Protein 7.0 6.0 - 8.4 g/dL    Albumin 3.8 3.5 - 5.2 g/dL    Total Bilirubin 0.3 0.1 - 1.0 mg/dL    Alkaline Phosphatase 69 55 - 135 U/L    AST 20 10 - 40 U/L    ALT 36 10 - 44 U/L    Anion Gap 11 8 - 16 mmol/L    eGFR if African American 43 (A) >60 mL/min/1.73 m^2    eGFR if non African American 37 (A) >60 mL/min/1.73 m^2   TSH   Result Value Ref Range    TSH 49.129 (H) 0.400 - 4.000 uIU/mL   T4, free   Result Value Ref Range    Free T4 0.69 (L) 0.71 - 1.51 ng/dL         Imaging Results:  Imaging Results          CT Soft Tissue Neck WO Contrast (Final result)  Result time 06/22/20 21:41:20    Final result by Zaid Brito MD (06/22/20 21:41:20)                 Impression:      No acute findings.  No soft tissue masses or significant adenopathy evident.    All CT scans at this facility are performed  using dose modulation techniques as appropriate to performed exam including the following:  automated exposure control; adjustment of mA and/or kV according to the patients size (this includes techniques or standardized protocols for targeted exams where dose is  matched to indication/reason for exam: i.e. extremities or head);  iterative reconstruction technique.      Electronically signed by: Zaid Brito MD  Date:    06/22/2020  Time:    21:41             Narrative:    EXAMINATION:  CT SOFT TISSUE NECK WITHOUT CONTRAST    CLINICAL HISTORY:  Palpable nodule or thyroid enlargement;    TECHNIQUE:  Low dose axial images, sagittal and coronal reformations were performed from the skull base to the level of the clavicles.  Contrast was not administered.    COMPARISON:  Soft tissue neck ultrasound 02/26/2020.  CT chest 01/15/2018    FINDINGS:  Status post thyroidectomy.  No masses or significant enlarged lymph nodes are noted.  Scattered small subcentimeter lymph nodes.    The airway is symmetric and unremarkable.  The noncontrast appearance of the salivary glands are unremarkable.    Moderate degenerative disc disease C5 through 7.  Small circumscribed lucent lesion in the manubrium is again noted with a sclerotic margin.  No interval change.    Emphysematous changes in the lungs.  There is a small subpleural right upper lobe nodule measuring approximately 5 mm which is stable dating back to comparison examination.  Remote granulomatous infection with calcified mediastinal lymph nodes and granulomas.                                 The EKG was ordered, reviewed, and independently interpreted by the ED provider.  Interpretation time: 19:48  Rate: 76 BPM  Rhythm: normal sinus rhythm  Interpretation: Low voltage QRS. No STEMI.    Results for SIVAKUMAR MOHAMUD (MRN [id number]) as of 6/23/2020 02:06   Ref. Range 6/17/2020 09:50 6/22/2020 19:11   TSH Latest Ref Range: 0.400 - 4.000 uIU/mL 119.660 (H) 49.129 (H)            The Emergency Provider reviewed the vital signs and test results, which are outlined above.     ED Discussion       10:25 PM: Reassessed pt at this time.  Pt states her condition has improved at this time. Discussed with pt all pertinent ED information and results.  Discussed pt dx and plan of tx. Gave pt all f/u and return to the ED instructions. All questions and concerns were addressed at this time. Pt expresses understanding of information and instructions, and is comfortable with plan to discharge. Pt is stable for discharge.    I discussed with patient and/or family/caretaker that evaluation in the ED does not suggest any emergent or life threatening medical conditions requiring immediate intervention beyond what was provided in the ED, and I believe patient is safe for discharge.  Regardless, an unremarkable evaluation in the ED does not preclude the development or presence of a serious of life threatening condition. As such, patient was instructed to return immediately for any worsening or change in current symptoms.    I discussed with patient and/or family/caretaker that negative X-ray does not rule out occult fracture or other soft tissue injury.  Persistent pain greater than 7-10 days or increased pain requires follow up, specifically with orthopedics.          Medical Decision Making:   Clinical Tests:   Lab Tests: Ordered and Reviewed  Radiological Study: Ordered and Reviewed  Medical Tests: Ordered and Reviewed     Additional MDM:   Smoking Cessation: The patient is a smoker. The patient was counseled on smoking cessation for: 3 minutes. The patient was counseled on tobacco related  health complications. Appropriate patient literature was given to the patient concerning tobacco cessation.        ED Medication(s):  Medications - No data to display    New Prescriptions    No medications on file       Follow-up Information     Dr. Nessa Jason In 2 days.    Why: Return to emergency department:  Fever, lethargy, rash, swelling to the neck, difficulty talking, swallowing  Contact information:  Call [phone] for appointment.                 History of Hypertension: The patient has elevated blood pressure (higher than 120/80) while being treated in the ED but has a  history of hypertension.        Scribe Attestation:   Scribe #1: I performed the above scribed service and the documentation accurately describes the services I performed. I attest to the accuracy of the note.     Attending:   Physician Attestation Statement for Scribe #1: I, Suha Palacios DO, personally performed the services described in this documentation, as scribed by Dalila Huntley, in my presence, and it is both accurate and complete.           Clinical Impression       ICD-10-CM ICD-9-CM   1. Neck pain on left side  M54.2 723.1   2. Elevated TSH  R79.89 794.5   3. Tobacco abuse  Z72.0 305.1   4. Renal insufficiency  N28.9 593.9       Disposition:   Disposition: Discharged  Condition: Stable                   Suha Palacios DO  06/23/20 0207

## 2020-07-02 ENCOUNTER — TELEPHONE (OUTPATIENT)
Dept: HEMATOLOGY/ONCOLOGY | Facility: CLINIC | Age: 50
End: 2020-07-02

## 2020-07-02 DIAGNOSIS — Z17.0 MALIGNANT NEOPLASM OF OVERLAPPING SITES OF RIGHT BREAST IN FEMALE, ESTROGEN RECEPTOR POSITIVE: ICD-10-CM

## 2020-07-02 DIAGNOSIS — G89.3 NEOPLASM RELATED PAIN: ICD-10-CM

## 2020-07-02 DIAGNOSIS — C79.51 SECONDARY CANCER OF BONE: ICD-10-CM

## 2020-07-02 DIAGNOSIS — C50.811 MALIGNANT NEOPLASM OF OVERLAPPING SITES OF RIGHT BREAST IN FEMALE, ESTROGEN RECEPTOR POSITIVE: ICD-10-CM

## 2020-07-02 DIAGNOSIS — C77.3 MALIGNANT NEOPLASM METASTATIC TO LYMPH NODE OF AXILLA: ICD-10-CM

## 2020-07-02 RX ORDER — PALBOCICLIB 125 MG/1
125 TABLET, FILM COATED ORAL DAILY
Qty: 21 TABLET | Refills: 6 | Status: SHIPPED | OUTPATIENT
Start: 2020-07-02 | End: 2020-12-29

## 2020-07-02 NOTE — TELEPHONE ENCOUNTER
----- Message from Amy Serrato sent at 7/2/2020  2:13 PM CDT -----  Regarding: Ibrance 125mg  Contact: [facility]  The script for Ibrance needs a dosage form, oral or injection, also capsules no longer avail, would like to change to 125mg Tablets, please call them back at [phone], acct # [id number], speak to Pharmacist.

## 2020-07-02 NOTE — TELEPHONE ENCOUNTER
Nurse retuned call, they will provide pt with tablet, because capsules are no longer avail. Nurse called to inform pt however, nurse left message informing pt regarding the change.

## 2020-07-03 NOTE — PROGRESS NOTES
[facility] -   General Surgery  Progress Note    Subjective:     History of Present Illness:  Patient has a history of hidradenitis.  This has been essentially treated with antibiotics and local wound care.  He has had not had extensive surgery.  She was diagnosed with breast mass in the metastatic breast cancer.  She is on treatment with ibrance and letrozole.  She saw her oncologist today and complained about redness, pain and swelling of the right axilla and the left groin.  He diagnosed her with a active infection in the area of hidradenitis and sent to the emergency room to be admitted for IV antibiotics and surgical consultation.    Patient states she's had hidradenitis for many years she never had any aggressive procedures regarding treatment for this.  She also has changes of her left axilla but these are not actively bothering her.  She has not had any significant right groin involvement in the recent past      Post-Op Info:  Procedure(s) (LRB):  EXCHANGE-WOUND VAC (Bilateral)   Day of Surgery     Interval History: Slight increase in pain compared to yesterday    Medications:  Continuous Infusions:   lactated ringers 75 mL/hr at 05/19/17 0832     Scheduled Meds:   citalopram  20 mg Oral Daily    enoxparin  40 mg Subcutaneous Q24H    [START ON 5/23/2017] ergocalciferol  50,000 Units Oral Q7 Days    linezolid 600mg/300ml  600 mg Intravenous Q12H    multivitamin  1 tablet Oral Daily    pantoprazole  40 mg Oral Daily     PRN Meds:albuterol sulfate, alprazolam, aluminum-magnesium hydroxide-simethicone, dextromethorphan-guaifenesin  mg/5 ml, dextrose 50%, dextrose 50%, diphenhydrAMINE, glucagon (human recombinant), glucose, glucose, metoclopramide HCl, morphine, ondansetron, ondansetron, oxycodone-acetaminophen, oxycodone-acetaminophen, sodium chloride 0.9%     Review of patient's allergies indicates:   Allergen Reactions    Vancomycin analogues Itching     Objective:     Vital Signs  (Most Recent):  Temp: 97.6 °F (36.4 °C) (05/19/17 1210)  Pulse: 78 (05/19/17 1210)  Resp: 18 (05/19/17 1210)  BP: 110/75 (05/19/17 1210)  SpO2: 97 % (05/19/17 1210) Vital Signs (24h Range):  Temp:  [97.2 °F (36.2 °C)-98.4 °F (36.9 °C)] 97.6 °F (36.4 °C)  Pulse:  [77-85] 78  Resp:  [18-20] 18  SpO2:  [91 %-97 %] 97 %  BP: (106-134)/(53-75) 110/75     Weight: 93.9 kg (207 lb)  Body mass index is 32.42 kg/(m^2).    Intake/Output - Last 3 Shifts       05/17 0700 - 05/18 0659 05/18 0700 - 05/19 0659 05/19 0700 - 05/20 0659    P.O.  1080     I.V. (mL/kg) 1519.3 (16.2)      IV Piggyback 700 600     Total Intake(mL/kg) 2219.3 (23.6) 1680 (17.9)     Urine (mL/kg/hr) 0 (0) 0 (0)     Other 25 (0) 25 (0)     Stool 0 (0)      Total Output 25 25      Net +2194.3 +1655             Urine Occurrence 6 x 9 x 2 x    Stool Occurrence 0 x 1 x           Physical Exam   Constitutional: No distress.   Overweight   Eyes: Pupils are equal, round, and reactive to light. No scleral icterus.   Neck: Normal range of motion. Neck supple.   Cardiovascular: Normal rate, regular rhythm and normal heart sounds.    Pulmonary/Chest: Effort normal.   Abdominal: Soft. Bowel sounds are normal.   Musculoskeletal: She exhibits no edema.   Skin:   There is a wound VAC to the right and left axilla with no leakage  There is a wound VAC to the left groin with no leakage   Vitals reviewed.      Significant Labs:  CBC:   Recent Labs  Lab 05/18/17  0500   WBC 6.81   RBC 3.20*   HGB 11.2*   HCT 33.3*      *   MCH 35.0*   MCHC 33.6     BMP:   Recent Labs  Lab 05/17/17  0559 05/18/17  0500    98    139   K 4.0 3.9    106   CO2 25 28   BUN 9 7   CREATININE 0.9 0.8   CALCIUM 9.0 8.5*   MG 2.3  --      CMP:   Recent Labs  Lab 05/16/17  1720  05/18/17  0500     < > 98   CALCIUM 9.3  < > 8.5*   ALBUMIN 3.7  --   --    PROT 7.4  --   --      < > 139   K 4.0  < > 3.9   CO2 24  < > 28     < > 106   BUN 10  < > 7    CREATININE 1.0  < > 0.8   ALKPHOS 84  --   --    ALT 13  --   --    AST 15  --   --    BILITOT 0.2  --   --    < > = values in this interval not displayed.    Significant Diagnostics:  None    Assessment/Plan:     Malignant neoplasm of overlapping sites of right female breast  Will need to restart Ibrance on Monday as infection should be clean and it will take several week for the wounds to hear or be ready for a skin graft    Malignant neoplasm metastatic to lymph node of axilla  If any enlarged lymph nodes are encountered and debridement of the hidradenitis of the right axilla these can be excised at that time    Secondary cancer of bone  Management per oncology    Hidradenitis  Patient has undergone excision of hidradenitis of the right and left axilla as well as the left groin.  She will undergo wound VAC changes on Monday Wednesdays and Fridays.  Once a granulation bed has formed she will need skin grafts of the right and left axilla.  Left groin is likely close on its own    Hidradenitis suppurativa of left axilla, resolved as of 5/19/2017  Wound VAC change in the operating room today.    Begin bedside wound VAC changes on Monday by the wound care nurse        Rodger Wahl MD  General Surgery  [facility] -    Self

## 2020-07-13 ENCOUNTER — TELEPHONE (OUTPATIENT)
Dept: PHARMACY | Facility: CLINIC | Age: 50
End: 2020-07-13

## 2020-07-15 ENCOUNTER — INFUSION (OUTPATIENT)
Dept: INFUSION THERAPY | Facility: HOSPITAL | Age: 50
End: 2020-07-15
Attending: INTERNAL MEDICINE
Payer: MEDICAID

## 2020-07-15 ENCOUNTER — LAB VISIT (OUTPATIENT)
Dept: LAB | Facility: HOSPITAL | Age: 50
End: 2020-07-15
Attending: INTERNAL MEDICINE
Payer: MEDICAID

## 2020-07-15 ENCOUNTER — OFFICE VISIT (OUTPATIENT)
Dept: HEMATOLOGY/ONCOLOGY | Facility: CLINIC | Age: 50
End: 2020-07-15
Payer: MEDICAID

## 2020-07-15 VITALS
OXYGEN SATURATION: 99 % | SYSTOLIC BLOOD PRESSURE: 101 MMHG | TEMPERATURE: 97 F | RESPIRATION RATE: 14 BRPM | WEIGHT: 231.69 LBS | DIASTOLIC BLOOD PRESSURE: 70 MMHG | BODY MASS INDEX: 36.36 KG/M2 | HEIGHT: 67 IN | HEART RATE: 74 BPM

## 2020-07-15 VITALS
WEIGHT: 231.69 LBS | DIASTOLIC BLOOD PRESSURE: 90 MMHG | HEIGHT: 67 IN | HEART RATE: 84 BPM | RESPIRATION RATE: 14 BRPM | SYSTOLIC BLOOD PRESSURE: 137 MMHG | BODY MASS INDEX: 36.36 KG/M2 | TEMPERATURE: 97 F | OXYGEN SATURATION: 97 %

## 2020-07-15 DIAGNOSIS — C79.51 SECONDARY CANCER OF BONE: ICD-10-CM

## 2020-07-15 DIAGNOSIS — R94.6 THYROID FUNCTION STUDY ABNORMALITY: ICD-10-CM

## 2020-07-15 DIAGNOSIS — C50.811 MALIGNANT NEOPLASM OF OVERLAPPING SITES OF RIGHT BREAST IN FEMALE, ESTROGEN RECEPTOR POSITIVE: ICD-10-CM

## 2020-07-15 DIAGNOSIS — C77.3 MALIGNANT NEOPLASM METASTATIC TO LYMPH NODE OF AXILLA: ICD-10-CM

## 2020-07-15 DIAGNOSIS — Z17.0 MALIGNANT NEOPLASM OF OVERLAPPING SITES OF RIGHT BREAST IN FEMALE, ESTROGEN RECEPTOR POSITIVE: ICD-10-CM

## 2020-07-15 DIAGNOSIS — C79.51 SECONDARY CANCER OF BONE: Primary | ICD-10-CM

## 2020-07-15 DIAGNOSIS — R94.6 THYROID FUNCTION STUDY ABNORMALITY: Primary | ICD-10-CM

## 2020-07-15 DIAGNOSIS — C73 PAPILLARY THYROID CARCINOMA: ICD-10-CM

## 2020-07-15 LAB
ALBUMIN SERPL BCP-MCNC: 3.6 G/DL (ref 3.5–5.2)
ALP SERPL-CCNC: 67 U/L (ref 55–135)
ALT SERPL W/O P-5'-P-CCNC: 54 U/L (ref 10–44)
ANION GAP SERPL CALC-SCNC: 9 MMOL/L (ref 8–16)
AST SERPL-CCNC: 26 U/L (ref 10–40)
BASOPHILS # BLD AUTO: 0.1 K/UL (ref 0–0.2)
BASOPHILS NFR BLD: 1.8 % (ref 0–1.9)
BILIRUB SERPL-MCNC: 0.3 MG/DL (ref 0.1–1)
BUN SERPL-MCNC: 8 MG/DL (ref 6–20)
CALCIUM SERPL-MCNC: 9 MG/DL (ref 8.7–10.5)
CHLORIDE SERPL-SCNC: 106 MMOL/L (ref 95–110)
CO2 SERPL-SCNC: 25 MMOL/L (ref 23–29)
CREAT SERPL-MCNC: 1.3 MG/DL (ref 0.5–1.4)
DIFFERENTIAL METHOD: ABNORMAL
EOSINOPHIL # BLD AUTO: 0.1 K/UL (ref 0–0.5)
EOSINOPHIL NFR BLD: 1.6 % (ref 0–8)
ERYTHROCYTE [DISTWIDTH] IN BLOOD BY AUTOMATED COUNT: 13.9 % (ref 11.5–14.5)
EST. GFR  (AFRICAN AMERICAN): 55 ML/MIN/1.73 M^2
EST. GFR  (NON AFRICAN AMERICAN): 48 ML/MIN/1.73 M^2
GLUCOSE SERPL-MCNC: 116 MG/DL (ref 70–110)
HCT VFR BLD AUTO: 36.6 % (ref 37–48.5)
HGB BLD-MCNC: 12.2 G/DL (ref 12–16)
IMM GRANULOCYTES # BLD AUTO: 0.17 K/UL (ref 0–0.04)
IMM GRANULOCYTES NFR BLD AUTO: 3 % (ref 0–0.5)
LYMPHOCYTES # BLD AUTO: 1.5 K/UL (ref 1–4.8)
LYMPHOCYTES NFR BLD: 27.1 % (ref 18–48)
MCH RBC QN AUTO: 37.4 PG (ref 27–31)
MCHC RBC AUTO-ENTMCNC: 33.3 G/DL (ref 32–36)
MCV RBC AUTO: 112 FL (ref 82–98)
MONOCYTES # BLD AUTO: 0.6 K/UL (ref 0.3–1)
MONOCYTES NFR BLD: 10.2 % (ref 4–15)
NEUTROPHILS # BLD AUTO: 3.1 K/UL (ref 1.8–7.7)
NEUTROPHILS NFR BLD: 56.3 % (ref 38–73)
NRBC BLD-RTO: 0 /100 WBC
PLATELET # BLD AUTO: 220 K/UL (ref 150–350)
PMV BLD AUTO: 10.1 FL (ref 9.2–12.9)
POTASSIUM SERPL-SCNC: 4.3 MMOL/L (ref 3.5–5.1)
PROT SERPL-MCNC: 7 G/DL (ref 6–8.4)
RBC # BLD AUTO: 3.26 M/UL (ref 4–5.4)
SODIUM SERPL-SCNC: 140 MMOL/L (ref 136–145)
T4 FREE SERPL-MCNC: 1.26 NG/DL (ref 0.71–1.51)
TSH SERPL DL<=0.005 MIU/L-ACNC: 1.62 UIU/ML (ref 0.4–4)
WBC # BLD AUTO: 5.58 K/UL (ref 3.9–12.7)

## 2020-07-15 PROCEDURE — 99214 OFFICE O/P EST MOD 30 MIN: CPT | Mod: PBBFAC,25 | Performed by: INTERNAL MEDICINE

## 2020-07-15 PROCEDURE — 99999 PR PBB SHADOW E&M-EST. PATIENT-LVL IV: CPT | Mod: PBBFAC,,, | Performed by: INTERNAL MEDICINE

## 2020-07-15 PROCEDURE — 99215 PR OFFICE/OUTPT VISIT, EST, LEVL V, 40-54 MIN: ICD-10-PCS | Mod: S$PBB,,, | Performed by: INTERNAL MEDICINE

## 2020-07-15 PROCEDURE — 84439 ASSAY OF FREE THYROXINE: CPT

## 2020-07-15 PROCEDURE — 99215 OFFICE O/P EST HI 40 MIN: CPT | Mod: S$PBB,,, | Performed by: INTERNAL MEDICINE

## 2020-07-15 PROCEDURE — 99999 PR PBB SHADOW E&M-EST. PATIENT-LVL IV: ICD-10-PCS | Mod: PBBFAC,,, | Performed by: INTERNAL MEDICINE

## 2020-07-15 PROCEDURE — 36415 COLL VENOUS BLD VENIPUNCTURE: CPT

## 2020-07-15 PROCEDURE — 85025 COMPLETE CBC W/AUTO DIFF WBC: CPT

## 2020-07-15 PROCEDURE — 63600175 PHARM REV CODE 636 W HCPCS: Mod: JG | Performed by: INTERNAL MEDICINE

## 2020-07-15 PROCEDURE — 96372 THER/PROPH/DIAG INJ SC/IM: CPT

## 2020-07-15 PROCEDURE — 80053 COMPREHEN METABOLIC PANEL: CPT

## 2020-07-15 PROCEDURE — 84443 ASSAY THYROID STIM HORMONE: CPT

## 2020-07-15 RX ADMIN — DENOSUMAB 120 MG: 120 INJECTION SUBCUTANEOUS at 12:07

## 2020-07-15 NOTE — PROGRESS NOTES
Subjective:      DATE OF VISIT: 7/15/2020   ?   ?   Patient ID:?Josey Flores is a 49 y.o. female.?? MR#: [id number]   ?   PRIMARY PROVIDER: Dr. Asencio  ?   CHIEF COMPLAINT:  Follow-up  ?   ONCOLOGIC DIAGNOSIS:      stage IV, T2 N1b M1, invasive breast carcinoma, ER/NJ+, HER2-    Papillary thyroid carcinoma status post total thyroidectomy on 08/31/2017, mpT1b N0    Cervical HSIL MIREILLE 3 s/p LEEP, 2017  ?   CURRENT TREATMENT: palbociclib and letrozole    HPI  She presents today with her partner in good spirits and notes pain well controlled.  Be No difficulty with swallowing, chest pain or shortness of breath.  Continues on calcium vitamin-D and denies dental issues.  She is taking as prescribed palbociclib and letrozole..    ONCOLOGIC HISTORY:      initial diagnosis after self palpation of mass near right nipple.  Further imaging revealed two lesions in right breast, axillary lymph node.  PET-CT in January 2017 revealed metastatic disease involving 2 lesions in right breast, right axilla, and metastatic disease in manubrium as well as left posterior ilium.  Thyroid was also noted to have avidity.  This was found to be papillary thyroid cancer and she underwent total thyroidectomy.  She notes development of bilateral upper extremity edema and underwent palliative bilateral axillary resection as well as resection of metastatic focus in left ilial region per patient report.     She also has a history of HSIL MIREILLE 3 status post LEEP in 2017.     Review of Systems    ?   A comprehensive 14-point review of systems was reviewed with patient and was negative other than as specified above.   ?     Objective:      Physical Exam      ?   Vitals:    07/15/20 1132   BP: (!) 137/90   Pulse: 84   Resp: 14   Temp: 97.3 °F (36.3 °C)   General appearance: Generally well appearing, in no acute distress.   Head, eyes, ears, nose, and throat: Oropharynx clear with moist mucous membranes.   Cardiovascular: Regular rate and rhythm, S1,  S2, no audible murmurs.   Respiratory: Lungs clear to auscultation bilaterally.   Abdomen: nontender, nondistended.   Extremities: Warm, without edema.   Neurologic: Alert and oriented.  Skin: No rashes, ecchymoses or petechial lesion.   Psychiatric: normal mood and affect, conversant and appropriate    Laboratory:  ?   Lab Visit on 07/15/2020   Component Date Value Ref Range Status    WBC 07/15/2020 5.58  3.90 - 12.70 K/uL Final    RBC 07/15/2020 3.26* 4.00 - 5.40 M/uL Final    Hemoglobin 07/15/2020 12.2  12.0 - 16.0 g/dL Final    Hematocrit 07/15/2020 36.6* 37.0 - 48.5 % Final    Mean Corpuscular Volume 07/15/2020 112* 82 - 98 fL Final    Mean Corpuscular Hemoglobin 07/15/2020 37.4* 27.0 - 31.0 pg Final    Mean Corpuscular Hemoglobin Conc 07/15/2020 33.3  32.0 - 36.0 g/dL Final    RDW 07/15/2020 13.9  11.5 - 14.5 % Final    Platelets 07/15/2020 220  150 - 350 K/uL Final    MPV 07/15/2020 10.1  9.2 - 12.9 fL Final    Immature Granulocytes 07/15/2020 3.0* 0.0 - 0.5 % Final    Gran # (ANC) 07/15/2020 3.1  1.8 - 7.7 K/uL Final    Immature Grans (Abs) 07/15/2020 0.17* 0.00 - 0.04 K/uL Final    Lymph # 07/15/2020 1.5  1.0 - 4.8 K/uL Final    Mono # 07/15/2020 0.6  0.3 - 1.0 K/uL Final    Eos # 07/15/2020 0.1  0.0 - 0.5 K/uL Final    Baso # 07/15/2020 0.10  0.00 - 0.20 K/uL Final    nRBC 07/15/2020 0  0 /100 WBC Final    Gran% 07/15/2020 56.3  38.0 - 73.0 % Final    Lymph% 07/15/2020 27.1  18.0 - 48.0 % Final    Mono% 07/15/2020 10.2  4.0 - 15.0 % Final    Eosinophil% 07/15/2020 1.6  0.0 - 8.0 % Final    Basophil% 07/15/2020 1.8  0.0 - 1.9 % Final    Differential Method 07/15/2020 Automated   Final    Sodium 07/15/2020 140  136 - 145 mmol/L Final    Potassium 07/15/2020 4.3  3.5 - 5.1 mmol/L Final    Chloride 07/15/2020 106  95 - 110 mmol/L Final    CO2 07/15/2020 25  23 - 29 mmol/L Final    Glucose 07/15/2020 116* 70 - 110 mg/dL Final    BUN, Bld 07/15/2020 8  6 - 20 mg/dL Final     Creatinine 07/15/2020 1.3  0.5 - 1.4 mg/dL Final    Calcium 07/15/2020 9.0  8.7 - 10.5 mg/dL Final    Total Protein 07/15/2020 7.0  6.0 - 8.4 g/dL Final    Albumin 07/15/2020 3.6  3.5 - 5.2 g/dL Final    Total Bilirubin 07/15/2020 0.3  0.1 - 1.0 mg/dL Final    Alkaline Phosphatase 07/15/2020 67  55 - 135 U/L Final    AST 07/15/2020 26  10 - 40 U/L Final    ALT 07/15/2020 54* 10 - 44 U/L Final    Anion Gap 07/15/2020 9  8 - 16 mmol/L Final    eGFR if African American 07/15/2020 55* >60 mL/min/1.73 m^2 Final    eGFR if non African American 07/15/2020 48* >60 mL/min/1.73 m^2 Final    TSH 07/15/2020 1.624  0.400 - 4.000 uIU/mL Final    Free T4 07/15/2020 1.26  0.71 - 1.51 ng/dL Final      ? IMAGING      2/26/20  CT CHEST ABDOMEN PELVIS WITH CONTRAST (XPD)    CLINICAL HISTORY:  Malignant neoplasm of overlapping sites of right female breastrestaging metastatic breast cancer;    FINDINGS:  CT of the chest with contrast.    Images through the chest reveal no detrimental change.  No mediastinal mass or bulky adenopathy.  Normal heart size.  Calcified nodes again identified.  No hilar mass or enlargement and no axillary abnormality is seen.  The lungs again show emphysematous change.  Mild atelectatic changes noted.  No suspicious nodule or mass.  Small nodule right upper lobe is again seen measuring 5 mm in diameter, unchanged..  Bony windows show no acute abnormality.  No detrimental change.  No suspicious lytic or blastic change.  Cystic change again noted within the sternum.    CT of the abdomen and pelvis with contrast.    Bony windows show no detrimental change.  Degenerative changes are noted.  Lytic lesions within the iliacs appear grossly stable.  There is no free intraperitoneal air within the abdomen or pelvis.  No bowel obstruction.    The liver again shows low-attenuation change consistent with fatty change.  No definite focal lesion is seen.  Gallbladder has been removed.  There is no adrenal or  pancreatic mass or enlargement.  No solid renal mass or obstruction.  No ascites or adenopathy.  Moderate volume of stool in the colon.  Borderline colonic wall thickening of the rectosigmoid possibly related to incomplete distention.  No abnormality to suggest acute appendicitis.      Impression       1.  No detrimental change in the appearance of the CT abdomen, pelvis, and chest since September 2019.    2.  Fatty liver.  Emphysematous change.  Bony changes are static.     US SOFT TISSUE HEAD NECK THYROID    CLINICAL HISTORY:  Personal history of malignant neoplasm of thyroidPatient with swelling of left submandibular gland with tenderness and also needs neck evaluation for pathologic lesions as she has a history of thyroid cancer status post thyroidectomy;    FINDINGS:  Patient is status post thyroidectomy.  Correlated with prior study from 2019.    The thyroid bed is unremarkable.  No mass.  No unusual fluid collection.    There is a small cervical node on the left measuring 6 mm in maximum diameter.  In the submandibular region on the left there is a node measuring 25 by is a 7 x 10 mm in diameter.  In the right cervical area there is are nodes measuring 7 mm and 6 mm in greatest length.      Impression       1.  There is no residual mass or thyroid tissue within the region of the thyroid bed.    2.  Lymph nodes bilaterally as described, most of which are less than a cm in diameter and considered normal.  There is 1 enlarged node in the region of the left submandibular gland as described.  Significance is indeterminate.       ?   Assessment/Plan:       1. Thyroid function study abnormality    2. Malignant neoplasm metastatic to lymph node of axilla    3. Papillary thyroid carcinoma    4. Secondary cancer of bone          Plan:     # metastatic breast:  Continued on systemic therapy with palbociclib and letrozole, tolerating well.  No new clinical symptoms or concerns, new pain or blood count abnormalities.   Previously discussed plan for restaging exams prior to next visit.    # thyroid function abnormality:  Ultrasound and CT neck earlier this year within normal limits.  Repeat TSH and free T4 now within normal limits.  Encouraged her to continue with current levothyroxine dosing and follow-up with Endocrinology p.r.n..     Chronic cancer related pain:  Following with palliative Care, well managed with 1 morphine in 1 Norco daily on average, improved sleep.    # bony metastatic disease:  Continues on prophylactic Xgeva, calcium and vitamin-D supplementation, had been on monthly and then [id number] over last year. No new bone issues although discussed typically administered [id number] and will proceed with this dosing schedule unless concern for side effect.  No dental issues, normal calcium and adequate renal function on labs today.  Alternatively can discuss potentially switching to zoledronic acid which has been used in setting of metastatic breast cancer with bony metastases every 12 weeks would be reasonable option if she prefers less frequent visits.  Would wait until restaging scans to ensure stable regimen/stability of her disease.      # HSIL:  Status post LEEP 2017, encouraged follow-up with her gynecologist for surveillance, no current acute issues/vaginal bleeding.    # history of papillary thyroid carcinoma status post total thyroidectomy on 08/31/2017: s/p total thyroidectomy on levothyroxine.  Metastatic breast cancer restaging imaging without findings for locally recurrent disease.  Thyroid function abnormality appears to have resolved per above.    Follow-Up:   - Xgeva today  - RV in 1 month with planned Xgeva, with restaging scans and labs prior

## 2020-07-15 NOTE — DISCHARGE INSTRUCTIONS
[facility]  [address]  [address]  [phone] phone     [phone] fax  Hours of Operation: Monday- Friday 8:00am- 5:00pm  After hours phone  [phone]  Hematology / Oncology Physicians on call      Dr. Jake Sy, VINNY Tobar, VINNY Dee NP    Please call with any concerns regarding your appointment today.  WAYS TO HELP PREVENT INFECTION         WASH YOUR HANDS OFTEN DURING THE DAY, ESPECIALLY BEFORE YOU EAT, AFTER USING THE BATHROOM, AND AFTER TOUCHING ANIMALS     STAY AWAY FROM PEOPLE WHO HAVE ILLNESSES YOU CAN CATCH; SUCH AS COLDS, FLU, CHICKEN POX     TRY TO AVOID CROWDS     STAY AWAY FROM CHILDREN WHO RECENTLY HAVE RECEIVED LIVE VIRUS VACCINES     MAINTAIN GOOD MOUTH CARE     DO NOT SQUEEZE OR SCRATCH PIMPLES     CLEAN CUTS & SCRAPES RIGHT AWAY AND DAILY UNTIL HEALED WITH WARM WATER, SOAP & AN ANTISEPTIC     AVOID CONTACT WITH LITTER BOXES, BIRD CAGES, & FISH TANKS     AVOID STANDING WATER, IE., BIRD BATHS, FLOWER POTS/VASES, OR HUMIDIFIERS     WEAR GLOVES WHEN GARDENING OR CLEANING UP AFTER OTHERS, ESPECIALLY BABIES & SMALL CHILDREN     DO NOT EAT RAW FISH, SEAFOOD, MEAT, OR EGGS  FALL PREVENTION   Falls often occur due to slipping, tripping or losing your balance. Here are ways to reduce your risk of falling again.   Was there anything that caused your fall that can be fixed, removed or replaced?   Make your home safe by keeping walkways clear of objects you may trip over.   Use non-slip pads under rugs.   Do not walk in poorly lit areas.   Do not stand on chairs or wobbly ladders.   Use caution when reaching overhead or looking upward. This position can cause a loss of balance.   Be sure your shoes fit properly, have non-slip bottoms and are in good condition.   Be cautious when going up and down stairs, curbs, and when walking on  uneven sidewalks.   If your balance is poor, consider using a cane or walker.   If your fall was related to alcohol use, stop or limit alcohol intake.   If your fall was related to use of sleeping medicines, talk to your doctor about this. You may need to reduce your dosage at bedtime if you awaken during the night to go to the bathroom.   To reduce the need for nighttime bathroom trips:   Avoid drinking fluids for several hours before going to bed   Empty your bladder before going to bed   Men can keep a urinal at the bedside   © 8591-7305 [facility], [address] [place] [address], [place] [address]. All rights reserved. This information is not intended as a substitute for professional medical care. Always follow your healthcare professional's instructions.

## 2020-07-17 ENCOUNTER — PATIENT MESSAGE (OUTPATIENT)
Dept: ENDOCRINOLOGY | Facility: CLINIC | Age: 50
End: 2020-07-17

## 2020-07-17 NOTE — TELEPHONE ENCOUNTER
Unable to reach pt to get an update regarding her symptoms, left message for pt to return call to clinic.

## 2020-07-18 ENCOUNTER — NURSE TRIAGE (OUTPATIENT)
Dept: ADMINISTRATIVE | Facility: CLINIC | Age: 50
End: 2020-07-18

## 2020-07-18 ENCOUNTER — HOSPITAL ENCOUNTER (EMERGENCY)
Facility: HOSPITAL | Age: 50
Discharge: HOME | End: 2020-07-18
Attending: EMERGENCY MEDICINE
Payer: MEDICAID

## 2020-07-18 VITALS
OXYGEN SATURATION: 96 % | TEMPERATURE: 99 F | WEIGHT: 229.94 LBS | HEIGHT: 67 IN | DIASTOLIC BLOOD PRESSURE: 61 MMHG | HEART RATE: 86 BPM | BODY MASS INDEX: 36.09 KG/M2 | SYSTOLIC BLOOD PRESSURE: 142 MMHG | RESPIRATION RATE: 16 BRPM

## 2020-07-18 DIAGNOSIS — J02.9 SORE THROAT: Primary | ICD-10-CM

## 2020-07-18 PROCEDURE — 99281 EMR DPT VST MAYX REQ PHY/QHP: CPT

## 2020-07-18 NOTE — TELEPHONE ENCOUNTER
S/s started 2 days ago. And is worsening. Doing salt water gargles and taking pain pills- with no relief. Has stage IV cancer. Left side of throat with swelling and pain with swallowing. Painful swallowing of liquids. No drooling. No fevers. Can swallow soft food. Gums are sore. No sores in mouth. Located: Left side under ear and midway of neck of neck-pt unable to open mouth all the way.   protocol advice given and  pt verbalizes understanding.           Reason for Disposition   Unable to open mouth completely    Additional Information   Negative: Severe difficulty breathing (e.g., struggling for each breath, speaks in single words)   Negative: [1] Node is in the neck AND [2] causes difficulty breathing   Negative: [1] Node is in the neck AND [2] can't swallow fluids   Negative: Fever > 103 F (39.4 C)   Negative: [1] Lump or swelling in groin AND [2] pulsating (like heartbeat)   Negative: Patient sounds very sick or weak to the triager   Negative: [1] Single large node AND [2] size > 1 inch (2.5 cm) AND [3] fever   Negative: [1] Overlying skin is red AND [2] fever   Negative: Severe difficulty breathing (e.g., struggling for each breath, speaks in single words, stridor)   Negative: Sounds like a life-threatening emergency to the triager   Negative: [1] Drooling or spitting out saliva (because can't swallow) AND [2] normal breathing    Protocols used: SORE THROAT-A-, LYMPH NODES - [id number]

## 2020-07-18 NOTE — ED PROVIDER NOTES
History      Chief Complaint   Patient presents with    Adenopathy     left lymph nodes swollen and painful x 2 days, states painful to swallow or talk.       Review of patient's allergies indicates:   Allergen Reactions    Nsaids (non-steroidal anti-inflammatory drug) Other (See Comments)     Instructed not to take NSAID drugs with chemo pill.    Vancomycin analogues Itching        HPI   HPI    7/18/2020, 12:50 PM   History obtained from the patient and patient records in EPIC      History of Present Illness: Josey Flores is a 49 y.o. female patient who presents to the Emergency Department for acute on chronic left neck pain for 2 days.  Hx of breast ca, thyroid ca.  She was here for same last month, had unremarkable neck ct.  She is able to swallow liquids, solids, and breath with no difficulty.  Denies fever.  Symptoms are moderate in severity.     No further complaints or concerns at this time.           PCP: No primary care provider on file.       Past Medical History:  Past Medical History:   Diagnosis Date    Anxiety     Asthma     Cancer     right breast, metastatic-lymph nodes, bone, and thyroid     COPD (chronic obstructive pulmonary disease)     Depression     History of psychiatric hospitalization     2000; suicidal ideation    Hx of psychiatric care     Hypothyroidism     Miscarriage     five miscarriages    Obesity     Psychiatric problem     Thyroid cancer 2017         Past Surgical History:  Past Surgical History:   Procedure Laterality Date    ABSCESS DRAINAGE      bilateral axilla    ADENOIDECTOMY      APPENDECTOMY      CHOLECYSTECTOMY      MASS EXCISION      SKIN GRAFT  06/16/2017    THYROIDECTOMY Bilateral     TONSILLECTOMY             Family History:  Family History   Problem Relation Age of Onset    Arthritis Mother     Early death Mother         64 at time of death    Heart attack Mother     Heart disease Mother     Heart failure Mother     Hypertension  Mother     Coronary artery disease Father     Hearing loss Father     Heart disease Father     Hyperlipidemia Father     Hypertension Father     Mental illness Father     Learning disabilities Son     Cancer Maternal Aunt            Social History:  Social History     Tobacco Use    Smoking status: Current Every Day Smoker     Packs/day: 1.00     Years: 32.00     Pack years: 32.00     Types: Cigarettes     Start date: 1/1/1985     Last attempt to quit: 6/11/2017     Years since quitting: 3.1    Smokeless tobacco: Never Used    Tobacco comment: 45 pack year history   Substance and Sexual Activity    Alcohol use: No    Drug use: No    Sexual activity: Yes     Partners: Male       ROS     Review of Systems   Constitutional: Negative for chills and fever.   HENT: Negative for facial swelling and trouble swallowing.    Eyes: Negative for discharge, redness and visual disturbance.   Respiratory: Negative for choking, chest tightness, shortness of breath and stridor.    Cardiovascular: Negative for chest pain and leg swelling.   Gastrointestinal: Negative for diarrhea and vomiting.   Genitourinary: Negative for decreased urine volume and dysuria.   Musculoskeletal: Negative for joint swelling and neck stiffness.   Skin: Negative for rash and wound.   Neurological: Negative for syncope and facial asymmetry.   All other systems reviewed and are negative.      Physical Exam      Initial Vitals [07/18/20 1232]   BP Pulse Resp Temp SpO2   (!) 142/61 86 16 98.8 °F (37.1 °C) 96 %      MAP       --         Physical Exam  Vital signs and nursing notes reviewed.  Constitutional: Patient is in NAD. Awake and alert. Well-developed and well-nourished.  Head: Atraumatic. Normocephalic.  Eyes: PERRL. EOM intact. Conjunctivae nl. No scleral icterus.  ENT: Mucous membranes are moist. Oropharynx is clear, no erythema or exudates, +patent  Neck: Supple. No JVD. No palpable lymphadenopathy or masses.  No  "meningismus  Cardiovascular: Regular rate and rhythm. No murmurs, rubs, or gallops. Distal pulses are 2+ and symmetric.  Pulmonary/Chest: No respiratory distress. Clear to auscultation bilaterally. No wheezing, rales, or rhonchi.  Abdominal: Soft. Non-distended. No TTP. No rebound, guarding, or rigidity. Good bowel sounds.  Genitourinary: No CVA tenderness  Musculoskeletal: Moves all extremities. No edema.   Skin: Warm and dry.  Neurological: Awake and alert. No acute focal neurological deficits are appreciated.  Psychiatric: Normal affect. Good eye contact. Appropriate in content.      ED Course          Procedures  ED Vital Signs:  Vitals:    07/18/20 1232   BP: (!) 142/61   Pulse: 86   Resp: 16   Temp: 98.8 °F (37.1 °C)   TempSrc: Oral   SpO2: 96%   Weight: 104.3 kg (229 lb 15 oz)   Height: 5' 7" (1.702 m)                 Imaging Results:  Imaging Results    None            The Emergency Provider reviewed the vital signs and test results, which are outlined above.    ED Discussion             Medication(s) given in the ER:  Medications - No data to display        Follow-up Information     Your primary care dr or your oncologist In 2 days.           [facility]    Specialty: Emergency Medicine  Why: If symptoms worsen  Contact information:  [address]  [place] [address] [phone]                        Medication List      ASK your doctor about these medications    albuterol 2.5 mg /3 mL (0.083 %) nebulizer solution  Commonly known as: PROVENTIL  Take 3 mLs (2.5 mg total) by nebulization every 6 (six) hours while awake.     ALPRAZolam 0.5 MG tablet  Commonly known as: XANAX  Take 1 tablet (0.5 mg total) by mouth 2 (two) times daily as needed for Insomnia or Anxiety.     benzonatate 200 MG capsule  Commonly known as: TESSALON  Take 1 capsule (200 mg total) by mouth 3 (three) times daily as needed for Cough.     budesonide 0.5 mg/2 mL nebulizer solution  Commonly " known as: PULMICORT  Take 2 mLs (0.5 mg total) by nebulization 2 (two) times daily. Wash out mouth after using.     calcium carbonate 400 mg calcium (1,000 mg) Chew     cyanocobalamin 1000 MCG tablet  Commonly known as: VITAMIN B-12  Take 1 tablet (1,000 mcg total) by mouth once daily.     HYDROcodone-acetaminophen  mg per tablet  Commonly known as: NORCO  Take 1 tablet by mouth every 4 (four) hours as needed for Pain.     * IBRANCE 125 mg Cap  Generic drug: palbociclib  TAKE ONE CAPSULE BY MOUTH ONCE DAILY. SWALLOW WHOLE. DO NOT TAKE IF CAPSULES ARE BROKEN OR CRACKED. AVOID GRAPEFRUIT PRODUCTS     * IBRANCE 125 mg Tab  Generic drug: palbociclib  Take 125 mg by mouth once daily. Take 125mg once daily for 21 days, followed by 7 days off, repeat every 28 days     ipratropium 42 mcg (0.06 %) nasal spray  Commonly known as: ATROVENT  2 sprays by Nasal route 4 (four) times daily. As needed for rhinitis. Take in evening before bed and in the morning     letrozole 2.5 mg Tab  Commonly known as: FEMARA  Take 1 tablet (2.5 mg total) by mouth once daily.     levothyroxine 150 MCG tablet  Commonly known as: SYNTHROID  Take 1 tablet (150 mcg total) by mouth once daily.     morphine 15 MG 12 hr tablet  Commonly known as: MS CONTIN  Take 1 tablet (15 mg total) by mouth every 12 (twelve) hours.     multivitamin per tablet  Commonly known as: THERAGRAN     VITAMIN D2 50,000 unit Cap  Generic drug: ergocalciferol         * This list has 2 medication(s) that are the same as other medications prescribed for you. Read the directions carefully, and ask your doctor or other care provider to review them with you.                    Medical Decision Making      I encouraged patient to call her pcp or oncologist Monday am.  I do not want to keep her here for non-emergent imaging and workup due to covid virus.   Airway is very patent and she is in no distress.    All findings were reviewed with the patient/family in detail.   All  remaining questions and concerns were addressed at that time.  Patient/family has been counseled regarding the need for follow-up as well as the indication to return to the emergency room should new or worrisome developments occur.        MDM               Clinical Impression:        ICD-10-CM ICD-9-CM   1. Sore throat  J02.9 [id number]             Ana Laura Graff PA-C  07/18/20 1304

## 2020-07-20 ENCOUNTER — TELEPHONE (OUTPATIENT)
Dept: ENDOCRINOLOGY | Facility: CLINIC | Age: 50
End: 2020-07-20

## 2020-07-20 NOTE — TELEPHONE ENCOUNTER
Pt was in ER on Saturday after triage nurse call, calling to see how pt is feeling, states that she is feeling better doing regular salt water gargles and states she now believes it is lymph nodes that are causing the problems

## 2020-07-21 ENCOUNTER — TELEPHONE (OUTPATIENT)
Dept: PULMONOLOGY | Facility: CLINIC | Age: 50
End: 2020-07-21

## 2020-07-31 ENCOUNTER — DOCUMENTATION ONLY (OUTPATIENT)
Dept: HEMATOLOGY/ONCOLOGY | Facility: CLINIC | Age: 50
End: 2020-07-31

## 2020-07-31 NOTE — PROGRESS NOTES
Nurse called pt informed her message refill was sent to Dr Rubio. She stated she was told Dr Ruibo no longer works in this department. Nurse ensured pt the message refill request was sent to Dr Rubio, and I am not sure why she was informed of this. Pt was pleased to received a call back. No other questions or concerns addressed prior to call ending.

## 2020-08-03 DIAGNOSIS — G89.3 CANCER ASSOCIATED PAIN: ICD-10-CM

## 2020-08-03 DIAGNOSIS — F41.9 ANXIETY: ICD-10-CM

## 2020-08-03 RX ORDER — ALPRAZOLAM 0.5 MG/1
0.5 TABLET ORAL 2 TIMES DAILY PRN
Qty: 60 TABLET | Refills: 1 | Status: SHIPPED | OUTPATIENT
Start: 2020-08-03 | End: 2020-09-02 | Stop reason: SDUPTHER

## 2020-08-03 RX ORDER — HYDROCODONE BITARTRATE AND ACETAMINOPHEN 10; 325 MG/1; MG/1
1 TABLET ORAL EVERY 4 HOURS PRN
Qty: 60 TABLET | Refills: 0 | Status: SHIPPED | OUTPATIENT
Start: 2020-08-03 | End: 2020-09-02 | Stop reason: SDUPTHER

## 2020-08-03 RX ORDER — MORPHINE SULFATE 15 MG/1
15 TABLET, FILM COATED, EXTENDED RELEASE ORAL EVERY 12 HOURS
Qty: 60 TABLET | Refills: 0 | Status: SHIPPED | OUTPATIENT
Start: 2020-08-03 | End: 2020-09-02 | Stop reason: SDUPTHER

## 2020-08-10 ENCOUNTER — TELEPHONE (OUTPATIENT)
Dept: RADIOLOGY | Facility: HOSPITAL | Age: 50
End: 2020-08-10

## 2020-08-10 ENCOUNTER — TELEPHONE (OUTPATIENT)
Dept: PHARMACY | Facility: CLINIC | Age: 50
End: 2020-08-10

## 2020-08-11 ENCOUNTER — HOSPITAL ENCOUNTER (OUTPATIENT)
Dept: RADIOLOGY | Facility: HOSPITAL | Age: 50
Discharge: HOME | End: 2020-08-11
Attending: INTERNAL MEDICINE
Payer: MEDICAID

## 2020-08-11 ENCOUNTER — LAB VISIT (OUTPATIENT)
Dept: LAB | Facility: HOSPITAL | Age: 50
End: 2020-08-11
Attending: INTERNAL MEDICINE
Payer: MEDICAID

## 2020-08-11 ENCOUNTER — DOCUMENTATION ONLY (OUTPATIENT)
Dept: HEMATOLOGY/ONCOLOGY | Facility: CLINIC | Age: 50
End: 2020-08-11

## 2020-08-11 DIAGNOSIS — C77.3 MALIGNANT NEOPLASM METASTATIC TO LYMPH NODE OF AXILLA: ICD-10-CM

## 2020-08-11 DIAGNOSIS — C79.51 SECONDARY CANCER OF BONE: ICD-10-CM

## 2020-08-11 LAB
ALBUMIN SERPL BCP-MCNC: 3.6 G/DL (ref 3.5–5.2)
ALP SERPL-CCNC: 82 U/L (ref 55–135)
ALT SERPL W/O P-5'-P-CCNC: 164 U/L (ref 10–44)
ANION GAP SERPL CALC-SCNC: 9 MMOL/L (ref 8–16)
AST SERPL-CCNC: 178 U/L (ref 10–40)
BASOPHILS # BLD AUTO: 0.04 K/UL (ref 0–0.2)
BASOPHILS NFR BLD: 1 % (ref 0–1.9)
BILIRUB SERPL-MCNC: 0.5 MG/DL (ref 0.1–1)
BUN SERPL-MCNC: 6 MG/DL (ref 6–20)
CALCIUM SERPL-MCNC: 8.8 MG/DL (ref 8.7–10.5)
CHLORIDE SERPL-SCNC: 105 MMOL/L (ref 95–110)
CO2 SERPL-SCNC: 28 MMOL/L (ref 23–29)
CREAT SERPL-MCNC: 1.4 MG/DL (ref 0.5–1.4)
DIFFERENTIAL METHOD: ABNORMAL
EOSINOPHIL # BLD AUTO: 0.1 K/UL (ref 0–0.5)
EOSINOPHIL NFR BLD: 1.6 % (ref 0–8)
ERYTHROCYTE [DISTWIDTH] IN BLOOD BY AUTOMATED COUNT: 13.4 % (ref 11.5–14.5)
EST. GFR  (AFRICAN AMERICAN): 50 ML/MIN/1.73 M^2
EST. GFR  (NON AFRICAN AMERICAN): 44 ML/MIN/1.73 M^2
GLUCOSE SERPL-MCNC: 118 MG/DL (ref 70–110)
HCT VFR BLD AUTO: 37.4 % (ref 37–48.5)
HGB BLD-MCNC: 12.5 G/DL (ref 12–16)
IMM GRANULOCYTES # BLD AUTO: 0.06 K/UL (ref 0–0.04)
IMM GRANULOCYTES NFR BLD AUTO: 1.6 % (ref 0–0.5)
LYMPHOCYTES # BLD AUTO: 1 K/UL (ref 1–4.8)
LYMPHOCYTES NFR BLD: 26 % (ref 18–48)
MCH RBC QN AUTO: 37.3 PG (ref 27–31)
MCHC RBC AUTO-ENTMCNC: 33.4 G/DL (ref 32–36)
MCV RBC AUTO: 112 FL (ref 82–98)
MONOCYTES # BLD AUTO: 0.6 K/UL (ref 0.3–1)
MONOCYTES NFR BLD: 14.8 % (ref 4–15)
NEUTROPHILS # BLD AUTO: 2.1 K/UL (ref 1.8–7.7)
NEUTROPHILS NFR BLD: 55 % (ref 38–73)
NRBC BLD-RTO: 0 /100 WBC
PLATELET # BLD AUTO: 215 K/UL (ref 150–350)
PMV BLD AUTO: 10.2 FL (ref 9.2–12.9)
POTASSIUM SERPL-SCNC: 4.1 MMOL/L (ref 3.5–5.1)
PROT SERPL-MCNC: 6.9 G/DL (ref 6–8.4)
RBC # BLD AUTO: 3.35 M/UL (ref 4–5.4)
SODIUM SERPL-SCNC: 142 MMOL/L (ref 136–145)
WBC # BLD AUTO: 3.85 K/UL (ref 3.9–12.7)

## 2020-08-11 PROCEDURE — 71260 CT THORAX DX C+: CPT | Mod: 26,,, | Performed by: RADIOLOGY

## 2020-08-11 PROCEDURE — 71260 CT CHEST ABDOMEN PELVIS WITH CONTRAST (XPD): ICD-10-PCS | Mod: 26,,, | Performed by: RADIOLOGY

## 2020-08-11 PROCEDURE — 74177 CT ABD & PELVIS W/CONTRAST: CPT | Mod: 26,,, | Performed by: RADIOLOGY

## 2020-08-11 PROCEDURE — 74177 CT CHEST ABDOMEN PELVIS WITH CONTRAST (XPD): ICD-10-PCS | Mod: 26,,, | Performed by: RADIOLOGY

## 2020-08-11 PROCEDURE — 36415 COLL VENOUS BLD VENIPUNCTURE: CPT

## 2020-08-11 PROCEDURE — 74177 CT ABD & PELVIS W/CONTRAST: CPT | Mod: TC

## 2020-08-11 PROCEDURE — 80053 COMPREHEN METABOLIC PANEL: CPT

## 2020-08-11 PROCEDURE — 85025 COMPLETE CBC W/AUTO DIFF WBC: CPT

## 2020-08-11 PROCEDURE — 25500020 PHARM REV CODE 255: Performed by: INTERNAL MEDICINE

## 2020-08-11 RX ADMIN — IOHEXOL 30 ML: 350 INJECTION, SOLUTION INTRAVENOUS at 09:08

## 2020-08-11 RX ADMIN — IOHEXOL 100 ML: 350 INJECTION, SOLUTION INTRAVENOUS at 10:08

## 2020-08-11 NOTE — NURSING
Per Dr. Asencio's request research done on [facility] genetic testing and STRATA status on this pt.  I have sent request to Tangela Galdamez RN to research whether STRATA was performed and I have contact [email or web address] and found out that MY [facility] test kit was drawn on pt in 2017, however the test was not run and cancelled due to insurance denial and pt not wanting to pay out of pocket for test.  Awaiting response from Tangela regarding STRATA . Information communicated to DR. Asencio.   Oncology Navigation   Intake  Cancer Type: Breast  MD Assigned: jorden  Initial Nurse Navigator Contact: 08/11/20  Contact Method: Phone  Date Worked: 08/11/20     Treatment                  Acuity  Comorbidities in Medical History: 2  Verbalizes the need for more education: 1  Navigation Acuity: 3     Follow Up  No follow-ups on file.

## 2020-08-12 ENCOUNTER — INFUSION (OUTPATIENT)
Dept: INFUSION THERAPY | Facility: HOSPITAL | Age: 50
End: 2020-08-12
Attending: INTERNAL MEDICINE
Payer: MEDICAID

## 2020-08-12 ENCOUNTER — OFFICE VISIT (OUTPATIENT)
Dept: HEMATOLOGY/ONCOLOGY | Facility: CLINIC | Age: 50
End: 2020-08-12
Payer: MEDICAID

## 2020-08-12 VITALS
WEIGHT: 224.88 LBS | HEART RATE: 86 BPM | RESPIRATION RATE: 14 BRPM | SYSTOLIC BLOOD PRESSURE: 124 MMHG | TEMPERATURE: 97 F | BODY MASS INDEX: 35.29 KG/M2 | OXYGEN SATURATION: 98 % | HEIGHT: 67 IN | DIASTOLIC BLOOD PRESSURE: 89 MMHG

## 2020-08-12 VITALS
RESPIRATION RATE: 16 BRPM | DIASTOLIC BLOOD PRESSURE: 84 MMHG | OXYGEN SATURATION: 97 % | SYSTOLIC BLOOD PRESSURE: 120 MMHG | TEMPERATURE: 98 F | HEART RATE: 71 BPM

## 2020-08-12 DIAGNOSIS — R74.01 TRANSAMINITIS: ICD-10-CM

## 2020-08-12 DIAGNOSIS — C79.51 SECONDARY CANCER OF BONE: ICD-10-CM

## 2020-08-12 DIAGNOSIS — C77.3 MALIGNANT NEOPLASM METASTATIC TO LYMPH NODE OF AXILLA: ICD-10-CM

## 2020-08-12 DIAGNOSIS — Z17.0 MALIGNANT NEOPLASM OF OVERLAPPING SITES OF RIGHT BREAST IN FEMALE, ESTROGEN RECEPTOR POSITIVE: ICD-10-CM

## 2020-08-12 DIAGNOSIS — C73 PAPILLARY THYROID CARCINOMA: Primary | ICD-10-CM

## 2020-08-12 DIAGNOSIS — C79.51 SECONDARY CANCER OF BONE: Primary | ICD-10-CM

## 2020-08-12 DIAGNOSIS — C50.811 MALIGNANT NEOPLASM OF OVERLAPPING SITES OF RIGHT BREAST IN FEMALE, ESTROGEN RECEPTOR POSITIVE: ICD-10-CM

## 2020-08-12 PROCEDURE — 99999 PR PBB SHADOW E&M-EST. PATIENT-LVL IV: ICD-10-PCS | Mod: PBBFAC,,, | Performed by: INTERNAL MEDICINE

## 2020-08-12 PROCEDURE — 63600175 PHARM REV CODE 636 W HCPCS: Mod: JG | Performed by: INTERNAL MEDICINE

## 2020-08-12 PROCEDURE — 96372 THER/PROPH/DIAG INJ SC/IM: CPT

## 2020-08-12 PROCEDURE — 99999 PR PBB SHADOW E&M-EST. PATIENT-LVL IV: CPT | Mod: PBBFAC,,, | Performed by: INTERNAL MEDICINE

## 2020-08-12 PROCEDURE — 99215 OFFICE O/P EST HI 40 MIN: CPT | Mod: S$PBB,,, | Performed by: INTERNAL MEDICINE

## 2020-08-12 PROCEDURE — 99214 OFFICE O/P EST MOD 30 MIN: CPT | Mod: PBBFAC,25 | Performed by: INTERNAL MEDICINE

## 2020-08-12 PROCEDURE — 99215 PR OFFICE/OUTPT VISIT, EST, LEVL V, 40-54 MIN: ICD-10-PCS | Mod: S$PBB,,, | Performed by: INTERNAL MEDICINE

## 2020-08-12 RX ADMIN — DENOSUMAB 120 MG: 120 INJECTION SUBCUTANEOUS at 11:08

## 2020-08-12 NOTE — PROGRESS NOTES
Subjective:      DATE OF VISIT: 8/13/2020   ?   ?   Patient ID:?Josey Flores is a 49 y.o. female.?? MR#: [id number]   ?   PRIMARY PROVIDER: Dr. Asencio  ?   CHIEF COMPLAINT:  Follow-up  ?   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   CURRENT TREATMENT: palbociclib and letrozole    Follow-up      She presents today with her partner doing well without acute events.  She has been receiving medications through the mail palbociclib and letrozole taking as prescribed.  She denies any current bony or other pain.      ONCOLOGIC HISTORY:      initial diagnosis after self palpation of mass near right nipple.  Further imaging revealed two lesions in right breast, axillary lymph node.  PET-CT in January 2017 revealed metastatic disease involving 2 lesions in right breast, right axilla, and metastatic disease in manubrium as well as left posterior ilium.  Thyroid was also noted to have avidity.  This was found to be papillary thyroid cancer and she underwent total thyroidectomy.  She notes development of bilateral upper extremity edema and underwent palliative bilateral axillary resection as well as resection of metastatic focus in left ilial region per patient report.     She also has a history of HSIL MIREILLE 3 status post LEEP in 2017.     Review of Systems    ?   A comprehensive 14-point review of systems was reviewed with patient and was negative other than as specified above.   ?     Objective:      Physical Exam      ?   Vitals:    08/12/20 1008   BP: 124/89   Pulse: 86   Resp: 14   Temp: 97.2 °F (36.2 °C)   General appearance: Generally well appearing, in no acute distress.   Head, eyes, ears, nose, and throat: Oropharynx clear with moist mucous membranes.   Cardiovascular: Regular rate and rhythm, S1, S2, no audible murmurs.   Respiratory: Lungs  clear to auscultation bilaterally.   Abdomen: nontender, nondistended.   Extremities: Warm, without edema.   Neurologic: Alert and oriented.  Skin: No rashes, ecchymoses or petechial lesion.   Psychiatric: normal mood and affect, conversant and appropriate    Laboratory:  ?   No visits with results within 1 Day(s) from this visit.   Latest known visit with results is:   Lab Visit on 08/11/2020   Component Date Value Ref Range Status    WBC 08/11/2020 3.85* 3.90 - 12.70 K/uL Final    RBC 08/11/2020 3.35* 4.00 - 5.40 M/uL Final    Hemoglobin 08/11/2020 12.5  12.0 - 16.0 g/dL Final    Hematocrit 08/11/2020 37.4  37.0 - 48.5 % Final    Mean Corpuscular Volume 08/11/2020 112* 82 - 98 fL Final    Mean Corpuscular Hemoglobin 08/11/2020 37.3* 27.0 - 31.0 pg Final    Mean Corpuscular Hemoglobin Conc 08/11/2020 33.4  32.0 - 36.0 g/dL Final    RDW 08/11/2020 13.4  11.5 - 14.5 % Final    Platelets 08/11/2020 215  150 - 350 K/uL Final    MPV 08/11/2020 10.2  9.2 - 12.9 fL Final    Immature Granulocytes 08/11/2020 1.6* 0.0 - 0.5 % Final    Gran # (ANC) 08/11/2020 2.1  1.8 - 7.7 K/uL Final    Immature Grans (Abs) 08/11/2020 0.06* 0.00 - 0.04 K/uL Final    Lymph # 08/11/2020 1.0  1.0 - 4.8 K/uL Final    Mono # 08/11/2020 0.6  0.3 - 1.0 K/uL Final    Eos # 08/11/2020 0.1  0.0 - 0.5 K/uL Final    Baso # 08/11/2020 0.04  0.00 - 0.20 K/uL Final    nRBC 08/11/2020 0  0 /100 WBC Final    Gran% 08/11/2020 55.0  38.0 - 73.0 % Final    Lymph% 08/11/2020 26.0  18.0 - 48.0 % Final    Mono% 08/11/2020 14.8  4.0 - 15.0 % Final    Eosinophil% 08/11/2020 1.6  0.0 - 8.0 % Final    Basophil% 08/11/2020 1.0  0.0 - 1.9 % Final    Differential Method 08/11/2020 Automated   Final    Sodium 08/11/2020 142  136 - 145 mmol/L Final    Potassium 08/11/2020 4.1  3.5 - 5.1 mmol/L Final    Chloride 08/11/2020 105  95 - 110 mmol/L Final    CO2 08/11/2020 28  23 - 29 mmol/L Final    Glucose 08/11/2020 118* 70 - 110 mg/dL Final     BUN, Bld 08/11/2020 6  6 - 20 mg/dL Final    Creatinine 08/11/2020 1.4  0.5 - 1.4 mg/dL Final    Calcium 08/11/2020 8.8  8.7 - 10.5 mg/dL Final    Total Protein 08/11/2020 6.9  6.0 - 8.4 g/dL Final    Albumin 08/11/2020 3.6  3.5 - 5.2 g/dL Final    Total Bilirubin 08/11/2020 0.5  0.1 - 1.0 mg/dL Final    Alkaline Phosphatase 08/11/2020 82  55 - 135 U/L Final    AST 08/11/2020 178* 10 - 40 U/L Final    ALT 08/11/2020 164* 10 - 44 U/L Final    Anion Gap 08/11/2020 9  8 - 16 mmol/L Final    eGFR if African American 08/11/2020 50* >60 mL/min/1.73 m^2 Final    eGFR if non African American 08/11/2020 44* >60 mL/min/1.73 m^2 Final      ? IMAGING     8/11/20  CT CHEST ABDOMEN PELVIS WITH CONTRAST (XPD)     CLINICAL HISTORY:  Restaging CT; Secondary and unspecified malignant neoplasm of axilla and upper limb lymph nodes     TECHNIQUE:  Low dose axial images, sagittal and coronal reformations were obtained from the thoracic inlet to the pubic symphysis following the IV administration of 100 mL of Omnipaque 350 and the oral administration of 30 ml of Omnipaque 350.     COMPARISON:  02/26/2020     FINDINGS:  Chest:     Heart and great vessels: Within normal limits     Adenopathy: Calcified mediastinal and right hilar lymph nodes are again demonstrated.  No thoracic adenopathy demonstrated by size criteria.     Lungs: 6 mm noncalcified nodule in the periphery of the right upper lobe is unchanged.  Calcified granuloma again noted in the right upper lobe.  No new pulmonary nodules demonstrated.  Prominent centrilobular emphysematous changes are again demonstrated in the apices.  No parenchymal consolidations or pleural effusions.     Abdomen:     Liver: Large in size with no focal lesions visualized.     Gallbladder and biliary: Prior cholecystectomy     Spleen: Within normal limits.     Pancreas: Within normal limits.     Adrenals: Within normal limits.     Kidneys: Within normal limits.     Bowel: No abnormal bowel  wall thickening or evidence of obstruction.  Appendix is not visualized.     Peritoneum: No ascites or pneumoperitoneum.     Abdominal Adenopathy: None.     Vasculature: Within normal limits.     Pelvis:     Urinary bladder: Unremarkable.     Uterus and adnexa: Grossly within normal limits     Pelvis adenopathy: None.     Bones: Previously described lytic lesions involving the bilateral iliacs and manubrium appear unchanged.  Few small mixed lytic and sclerotic foci in the thoracic spine also appear unchanged.  No new osseous lesions visualized.     Miscellaneous: None.     Impression:     1. Noncalcified pulmonary nodule in the right upper lobe appears unchanged measuring 6 mm.  2. Known multifocal osseous metastatic disease is unchanged in appearance.  3. Other stable findings as above    ?   Assessment/Plan:       1. Papillary thyroid carcinoma    2. Secondary cancer of bone    3. Malignant neoplasm of overlapping sites of right breast in female, estrogen receptor positive    4. Malignant neoplasm metastatic to lymph node of axilla    5. Transaminitis          Plan:     # metastatic breast cancer ER positive HER2 negative:  Continued on systemic therapy with palbociclib and letrozole, tolerating well.  No new clinical symptoms or concerns, new pain or blood count abnormalities, stable cytopenias as expected with therapy.  I reviewed with her results of CT restaging exam showing stable bony abnormalities.  I did discuss given stability unclear if there is any active bony disease and can consider PET for next restaging scan.  Of note it does not appear she has ever had germline or somatic testing on tumor and have offered this given young age of diagnosis as well as concomitant papillary thyroid carcinoma. I did discuss sending for somatic testing with strata however may be more useful to send if evidence of progression in which case rebiopsy would be indicated to reassess marker status and mutational changes at  that time to guide therapy.  She was amenable to this plan and continuing her current therapy which has been keeping her disease under excellent control without notable toxicity.    # thyroid function abnormality:  Ultrasound and CT neck earlier this year within normal limits.  Repeat TSH and free T4 now within normal limits.  Encouraged her to continue with current levothyroxine dosing and follow-up with Endocrinology p.r.n..    # Chronic cancer related pain:  Following with palliative Care, well managed with 1 morphine in 1 Norco daily on average, improved sleep.    # bony metastatic disease:  Given stabilize bony disease I discuss option of switching from denosumab to zoledronic acid dosed every 12 weeks which she is interested in to avoid frequency of visits.  Will change this with next months dose.    # HSIL:  Status post LEEP 2017, encouraged follow-up with her gynecologist for surveillance, no current acute issues/vaginal bleeding.    # history of papillary thyroid carcinoma status post total thyroidectomy on 08/31/2017: s/p total thyroidectomy on levothyroxine.  Metastatic breast cancer restaging imaging without findings for locally recurrent disease.  Thyroid function abnormality appears to have resolved per above.    Follow-Up:   - Xgeva today  - RV in 1 month with planned switch to ZA, with labs prior, plan discussed germline genetic testing with NP

## 2020-08-12 NOTE — DISCHARGE INSTRUCTIONS
[facility]  [address] [facility]  [address]  [phone] phone     [phone] fax  Hours of Operation: Monday- Friday 8:00am- 5:00pm  After hours phone  [phone]  Hematology / Oncology Physicians on call      Dr. Jake Arce      Please call with any concerns regarding your appointment today.        Remember to take your calcium with vitamin D supplement as directed.   Do not have any dental work for 3 months following your injection today.    Calcium Supplements   Calcium is a mineral that helps us make strong bones and teeth. Most of the calcium in our bodies is in our bones. We spend almost half of our lives (30 to 35 years) building to a peak bone mass. Taking in enough calcium helps younger people build strong bones. Maintaining a safe calcium level helps older people limit bone loss. Our bodies cannot make calcium, so we must get it from foods or supplements.   Why Use a Supplement?   You might need a supplement if you fall in to one or more of the following categories:   I dont eat dairy products or other foods that are high in calcium (such as kale, bok andres, and calcium-fortified foods) 2 to 3 times a day.   I am a woman past menopause.   I am pregnant or breastfeeding.   I do not get frequent weight-bearing exercise (such as walking, running, or weightlifting).  Suggested Daily Intake   The daily recommended amount of calcium depends on many factors, including your age and sex.   Your health care provider can help you choose the best amount of calcium for you.    If You Take Calcium   Here are some tips to help you get the most from a calcium supplement:   Choose calcium carbonate or calcium citrate. (The citrate form is easy to absorb.) Avoid calcium from oyster shells. Aim for calcium that is plant-based.   Check the label for elemental calcium. You need 1,000 to 1,500 mg.   If you also take an iron  supplement, take it a few hours before or after the calcium.   Be aware that taking calcium interferes with the absorption of certain types of antibiotics. Talk to your pharmacist.   Eat a balanced diet to supply all the nutrients your body needs.  Food Sources of Calcium   Milk, yogurt, and cheese   Certain green leafy vegetables, such as kale, bok andres, and hu greens   Fish with bones, such as canned salmon, mackerel, and sardines   Tofu made with calcium carbonate (not the type called nigiri)   Drinks that have calcium added, such as some orange juice and rice and soy drinks    © 7618-2375 [facility], [address] [place] [address], [place] [address]. All rights reserved. This information is not intended as a substitute for professional medical care. Always follow your healthcare professional's instructions.

## 2020-08-12 NOTE — PATIENT INSTRUCTIONS
Zoledronic Acid injection (Hypercalcemia, Oncology)  What is this medicine?  ZOLEDRONIC ACID (JAIME le dron ik AS id) lowers the amount of calcium loss from bone. It is used to treat too much calcium in your blood from cancer. It is also used to prevent complications of cancer that has spread to the bone.  How should I use this medicine?  This medicine is for infusion into a vein. It is given by a health care professional in a hospital or clinic setting.  Talk to your pediatrician regarding the use of this medicine in children. Special care may be needed.  What side effects may I notice from receiving this medicine?  Side effects that you should report to your doctor or health care professional as soon as possible:  · allergic reactions like skin rash, itching or hives, swelling of the face, lips, or tongue  · anxiety, confusion, or depression  · breathing problems  · changes in vision  · eye pain  · feeling faint or lightheaded, falls  · jaw pain, especially after dental work  · mouth sores  · muscle cramps, stiffness, or weakness  · redness, blistering, peeling or loosening of the skin, including inside the mouth  · trouble passing urine or change in the amount of urine  Side effects that usually do not require medical attention (report to your doctor or health care professional if they continue or are bothersome):  · bone, joint, or muscle pain  · constipation  · diarrhea  · fever  · hair loss  · irritation at site where injected  · loss of appetite  · nausea, vomiting  · stomach upset  · trouble sleeping  · trouble swallowing  · weak or tired  What may interact with this medicine?  · certain antibiotics given by injection  · NSAIDs, medicines for pain and inflammation, like ibuprofen or naproxen  · some diuretics like bumetanide, furosemide  · teriparatide  · thalidomide  What if I miss a dose?  It is important not to miss your dose. Call your doctor or health care professional if you are unable to keep an  appointment.  Where should I keep my medicine?  This drug is given in a hospital or clinic and will not be stored at home.  What should I tell my health care provider before I take this medicine?  They need to know if you have any of these conditions:  · aspirin-sensitive asthma  · cancer, especially if you are receiving medicines used to treat cancer  · dental disease or wear dentures  · infection  · kidney disease  · receiving corticosteroids like dexamethasone or prednisone  · an unusual or allergic reaction to zoledronic acid, other medicines, foods, dyes, or preservatives  · pregnant or trying to get pregnant  · breast-feeding  What should I watch for while using this medicine?  Visit your doctor or health care professional for regular checkups. It may be some time before you see the benefit from this medicine. Do not stop taking your medicine unless your doctor tells you to. Your doctor may order blood tests or other tests to see how you are doing.  Women should inform their doctor if they wish to become pregnant or think they might be pregnant. There is a potential for serious side effects to an unborn child. Talk to your health care professional or pharmacist for more information.  You should make sure that you get enough calcium and vitamin D while you are taking this medicine. Discuss the foods you eat and the vitamins you take with your health care professional.  Some people who take this medicine have severe bone, joint, and/or muscle pain. This medicine may also increase your risk for jaw problems or a broken thigh bone. Tell your doctor right away if you have severe pain in your jaw, bones, joints, or muscles. Tell your doctor if you have any pain that does not go away or that gets worse.  Tell your dentist and dental surgeon that you are taking this medicine. You should not have major dental surgery while on this medicine. See your dentist to have a dental exam and fix any dental problems before  starting this medicine. Take good care of your teeth while on this medicine. Make sure you see your dentist for regular follow-up appointments.  NOTE:This sheet is a summary. It may not cover all possible information. If you have questions about this medicine, talk to your doctor, pharmacist, or health care provider. Copyright© 2017 [employer]

## 2020-08-12 NOTE — NURSING
1122  8/12/20  Injection given without difficulties.Bandaid applied. Patient instructed to stay in the clinic for 15 minutes. Patient verbalized understanding and will notify nurse with any complaints.

## 2020-08-13 DIAGNOSIS — G89.3 CANCER ASSOCIATED PAIN: ICD-10-CM

## 2020-08-13 RX ORDER — HYDROCODONE BITARTRATE AND ACETAMINOPHEN 10; 325 MG/1; MG/1
1 TABLET ORAL EVERY 4 HOURS PRN
Qty: 60 TABLET | Refills: 0 | Status: CANCELLED | OUTPATIENT
Start: 2020-08-13

## 2020-08-14 NOTE — TELEPHONE ENCOUNTER
----- Message from Lucina Rebolledo sent at 8/14/2020 10:47 AM CDT -----  Type:  RX Refill Request    Who Called: Josey Flores  Refill or New Rx: REFILL  RX Name and Strength: HYDROcodone-acetaminophen (NORCO)  mg per tablet  How is the patient currently taking it? (ex. 1XDay): Take 1 tablet by mouth every 4 (four) hours as needed for Pain. - Oral  Is this a 30 day or 90 day Rx: 30 day  Preferred Pharmacy with phone number:   [facility] [address] [facility] [address] [facility]  [address] [facility] [address]  Phone: [phone] Fax: [phone]      Local or Mail Order:local  Ordering Provider: Ramiro  Would the patient rather a call back or a response via [facility]? Call back OR [facility]  Best Call Back Number:[phone] (cell)   Additional Information: Patient calling in regards to a Rx refill request. Patient has stated that she has reached out to Provider's office about 2 weeks ago through her Member [place]. Patient is stating that she  is completely out

## 2020-08-14 NOTE — TELEPHONE ENCOUNTER
----- Message from Christos Lara sent at 8/14/2020 11:39 AM CDT -----  Contact: [place]  Type:  Patient Returning Call    Who Called:[facility]  Who Left Message for Patient:nurse  Does the patient know what this is regarding?:medication  Would the patient rather a call back or a response via [facility]? Call back  Best Call Back Number:[phone]  Additional Information:

## 2020-08-26 ENCOUNTER — OFFICE VISIT (OUTPATIENT)
Dept: HEMATOLOGY/ONCOLOGY | Facility: CLINIC | Age: 50
End: 2020-08-26
Payer: MEDICAID

## 2020-08-26 VITALS
BODY MASS INDEX: 35.16 KG/M2 | TEMPERATURE: 97 F | OXYGEN SATURATION: 95 % | DIASTOLIC BLOOD PRESSURE: 87 MMHG | HEIGHT: 67 IN | RESPIRATION RATE: 14 BRPM | SYSTOLIC BLOOD PRESSURE: 120 MMHG | HEART RATE: 79 BPM | WEIGHT: 224 LBS

## 2020-08-26 DIAGNOSIS — C50.811 MALIGNANT NEOPLASM OF OVERLAPPING SITES OF RIGHT BREAST IN FEMALE, ESTROGEN RECEPTOR POSITIVE: ICD-10-CM

## 2020-08-26 DIAGNOSIS — C73 PAPILLARY THYROID CARCINOMA: ICD-10-CM

## 2020-08-26 DIAGNOSIS — J44.9 CHRONIC OBSTRUCTIVE PULMONARY DISEASE, UNSPECIFIED COPD TYPE: ICD-10-CM

## 2020-08-26 DIAGNOSIS — R87.613 HSIL (HIGH GRADE SQUAMOUS INTRAEPITHELIAL LESION) ON PAP SMEAR OF CERVIX: ICD-10-CM

## 2020-08-26 DIAGNOSIS — Z17.0 MALIGNANT NEOPLASM OF OVERLAPPING SITES OF RIGHT BREAST IN FEMALE, ESTROGEN RECEPTOR POSITIVE: ICD-10-CM

## 2020-08-26 DIAGNOSIS — C77.3 MALIGNANT NEOPLASM METASTATIC TO LYMPH NODE OF AXILLA: Primary | ICD-10-CM

## 2020-08-26 DIAGNOSIS — N95.0 POST-MENOPAUSAL BLEEDING: ICD-10-CM

## 2020-08-26 DIAGNOSIS — C79.51 SECONDARY CANCER OF BONE: ICD-10-CM

## 2020-08-26 PROCEDURE — 99214 OFFICE O/P EST MOD 30 MIN: CPT | Mod: PBBFAC | Performed by: NURSE PRACTITIONER

## 2020-08-26 PROCEDURE — 99215 OFFICE O/P EST HI 40 MIN: CPT | Mod: S$PBB,,, | Performed by: NURSE PRACTITIONER

## 2020-08-26 PROCEDURE — 99215 PR OFFICE/OUTPT VISIT, EST, LEVL V, 40-54 MIN: ICD-10-PCS | Mod: S$PBB,,, | Performed by: NURSE PRACTITIONER

## 2020-08-26 PROCEDURE — 99999 PR PBB SHADOW E&M-EST. PATIENT-LVL IV: ICD-10-PCS | Mod: PBBFAC,,, | Performed by: NURSE PRACTITIONER

## 2020-08-26 PROCEDURE — 99999 PR PBB SHADOW E&M-EST. PATIENT-LVL IV: CPT | Mod: PBBFAC,,, | Performed by: NURSE PRACTITIONER

## 2020-08-26 NOTE — PATIENT INSTRUCTIONS
COVID-19 and Cancer Patients    What is COVID-19?  COVID-19 is a new type of virus that can cause mild to severe infections in the lungs. Like other viruses, it can lead to serious infections for people with weakened immune systems. COVID-19 may cause more severe infections than other viruses. We do not have a vaccine to help control its spread, but experts are working to make a vaccine.    How does COVID-19 spread?  The virus can spread easily, just like the common cold or flu. It spreads when an infected person coughs or sneezes droplets that can get into the eyes, nose, or mouth of people nearby. Droplets also land on surfaces that people touch before touching their own eyes, nose, or mouth.    How can I protect myself?  These are some of the best ways to protect yourself and others from the virus:  - Wash your hands often with soap and water for at least 20 seconds.  - Use hand  with 60% or more alcohol until you can wash your hands with soap and water.  - Avoid touching your eyes, nose, and mouth without washing your hands first.  - Clean and disinfect surfaces often. Regular household wipes and sprays will kill the virus. Be sure to  clean places that people touch a lot, such as door handles, phones, keyboards, and light switches.  - Avoid handshakes, hugging, and standing or sitting close to people who are coughing or sneezing.  - Be as healthy as you can. Get plenty of sleep, eat healthy, exercise, and manage your stress.  If you are sick, follow these steps:  - Stay home.  - Cover your nose and mouth when you cough or sneeze. If you use a tissue, put it in the garbage right  away. If you do not have a tissue, cough or sneeze into your elbow crease.  - Call before going to your medical appointments. Let them know about recent travel or if you have had  contact with a person with COVID-19.          As of 3/12/2020  Should I wear a mask?  Experts don't believe that wearing a mask is helpful for the  general public, unless there is concern you have been exposed and may not have symptoms. Some people should use certain types of masks because of their own health or the type of work they do. Talk to your doctor or nurse to see if you would benefit from wearing a mask. If you arrive at the hospital with respiratory symptoms, please ask for a mask.    What if I care for or live with a cancer patient?  If you are caring for or living with someone with cancer, do your best to keep them from getting the virus.  Follow the steps to protect yourself listed on this sheet.  If you become sick yourself, call your doctor to see what more you should do to protect your loved one.    What about people visiting?   If you are sick or have been exposed to COVID-19, we ask that you not come to [facility].    If patients have symptoms, call the [facility] Covid-[facility] Line ([phone])    We ask that you find someone who isn't sick to join your loved one at their appointments.  To protect all patients, we may limit the number of people who can visit someone staying in the hospital.  Please check with your care team.    How will [facility] protect me from getting COVID-19?  Our hospital and clinics are taking steps to keep infected patients separate from those who may be at risk. At every appointment, your care team will ask questions about overall health and recent travel. We may ask some patients to wait in a separate room or to reschedule until they are feeling better if they have symptoms.  We are also taking extra steps to clean and disinfect surfaces throughout our hospital and clinics.     Will you still care for me if I get sick?  Yes. Your care is our top priority. Although we may change some ways we care for you, we will never put your care or health at risk.            CALL BEFORE YOU ACT   Dial 211 or Text keyword LACOVID to [phone] for general questions and information.   If patients have  symptoms, call the [facility] Covid-[facility] Line ([phone])               [facility] ([email or web address])    [email or web address]

## 2020-08-26 NOTE — PROGRESS NOTES
Subjective:       Patient ID: Josey Flores is a 49 y.o. female.    Chief Complaint: Follow-up    ONCOLOGIC DIAGNOSIS:       stage IV, T2 N1b M1, invasive breast carcinoma, ER/TX+, HER2-     Papillary thyroid carcinoma status post total thyroidectomy on 08/31/2017, mpT1b N0     Cervical HSIL MIREILLE 3 s/p LEEP, 2017     CURRENT TREATMENT: palbociclib and letrozole           She presents today with her partner doing well without acute events.  She has been receiving medications through the mail palbociclib and letrozole taking as prescribed.  She denies any current bony or other pain.       ONCOLOGIC HISTORY:       Initial diagnosis after self palpation of mass near right nipple.  Further imaging revealed two lesions in right breast, axillary lymph node.  PET-CT in January 2017 revealed metastatic disease involving 2 lesions in right breast, right axilla, and metastatic disease in manubrium as well as left posterior ilium.  Thyroid was also noted to have avidity.  This was found to be papillary thyroid cancer and she underwent total thyroidectomy.  She notes development of bilateral upper extremity edema and underwent palliative bilateral axillary resection as well as resection of metastatic focus in left ilial region per patient report.      She also has a history of HSIL MIREILLE 3 status post LEEP in 2017.     Review of Systems   Constitutional: Positive for fatigue. Negative for appetite change, chills, diaphoresis, fever and unexpected weight change.   HENT: Negative for congestion, hearing loss, mouth sores, postnasal drip, rhinorrhea, sore throat and trouble swallowing.    Eyes: Negative for discharge and visual disturbance.   Respiratory: Negative for cough, chest tightness and shortness of breath.    Cardiovascular: Negative for chest pain, palpitations and leg swelling.   Gastrointestinal: Negative for blood in stool, constipation, diarrhea, nausea and vomiting.   Endocrine: Negative for cold intolerance and  heat intolerance.   Genitourinary: Negative for difficulty urinating, dyspareunia, flank pain and hematuria.   Musculoskeletal: Negative for arthralgias, back pain and myalgias.   Skin: Negative.    Neurological: Negative for dizziness, weakness, light-headedness and headaches.   Hematological: Negative for adenopathy. Does not bruise/bleed easily.   Psychiatric/Behavioral: Negative for agitation, behavioral problems and confusion. The patient is not nervous/anxious.        Medication List with Changes/Refills   Current Medications    ALBUTEROL (PROVENTIL) 2.5 MG /3 ML (0.083 %) NEBULIZER SOLUTION    Take 3 mLs (2.5 mg total) by nebulization every 6 (six) hours while awake.    ALPRAZOLAM (XANAX) 0.5 MG TABLET    Take 1 tablet (0.5 mg total) by mouth 2 (two) times daily as needed for Insomnia or Anxiety.    BENZONATATE (TESSALON) 200 MG CAPSULE    Take 1 capsule (200 mg total) by mouth 3 (three) times daily as needed for Cough.    BUDESONIDE (PULMICORT) 0.5 MG/2 ML NEBULIZER SOLUTION    Take 2 mLs (0.5 mg total) by nebulization 2 (two) times daily. Wash out mouth after using.    CALCIUM CARBONATE 400 MG CALCIUM (1,000 MG) CHEW    Take 2,000 mg by mouth once daily.    CYANOCOBALAMIN (VITAMIN B-12) 1000 MCG TABLET    Take 1 tablet (1,000 mcg total) by mouth once daily.    ERGOCALCIFEROL (VITAMIN D2) 50,000 UNIT CAP    Take 5,000 Units by mouth once daily at 6am.     EUTHYROX 150 MCG TABLET    Take 1 tablet by mouth once daily    HYDROCODONE-ACETAMINOPHEN (NORCO)  MG PER TABLET    Take 1 tablet by mouth every 4 (four) hours as needed for Pain.    IBRANCE 125 MG CAP    TAKE ONE CAPSULE BY MOUTH ONCE DAILY. SWALLOW WHOLE. DO NOT TAKE IF CAPSULES ARE BROKEN OR CRACKED. AVOID GRAPEFRUIT PRODUCTS    IPRATROPIUM (ATROVENT) 42 MCG (0.06 %) NASAL SPRAY    2 sprays by Nasal route 4 (four) times daily. As needed for rhinitis. Take in evening before bed and in the morning    LETROZOLE (FEMARA) 2.5 MG TAB    Take 1 tablet  (2.5 mg total) by mouth once daily.    MORPHINE (MS CONTIN) 15 MG 12 HR TABLET    Take 1 tablet (15 mg total) by mouth every 12 (twelve) hours.    MULTIVITAMIN (THERAGRAN) PER TABLET    Take 1 tablet by mouth once daily.    PALBOCICLIB (IBRANCE) 125 MG TAB    Take 125 mg by mouth once daily. Take 125mg once daily for 21 days, followed by 7 days off, repeat every 28 days     Objective:     Vitals:    08/26/20 1115   BP: 120/87   Pulse: 79   Resp: 14   Temp: 97.3 °F (36.3 °C)     Physical Exam  Vitals signs reviewed.   Constitutional:       General: She is not in acute distress.     Appearance: She is well-developed.   HENT:      Head: Normocephalic and atraumatic.      Right Ear: Hearing and external ear normal.      Left Ear: Hearing and external ear normal.      Nose: No rhinorrhea.      Right Sinus: No maxillary sinus tenderness or frontal sinus tenderness.      Left Sinus: No maxillary sinus tenderness or frontal sinus tenderness.      Mouth/Throat:      Mouth: No oral lesions.      Pharynx: Uvula midline.   Eyes:      General:         Right eye: No discharge.         Left eye: No discharge.      Conjunctiva/sclera: Conjunctivae normal.      Pupils: Pupils are equal, round, and reactive to light.   Neck:      Musculoskeletal: Normal range of motion.      Thyroid: No thyromegaly.      Vascular: No carotid bruit.      Trachea: No tracheal deviation.   Cardiovascular:      Rate and Rhythm: Normal rate and regular rhythm.      Pulses:           Dorsalis pedis pulses are 2+ on the right side and 2+ on the left side.      Heart sounds: Normal heart sounds, S1 normal and S2 normal. No murmur.   Pulmonary:      Effort: Pulmonary effort is normal. No respiratory distress.      Breath sounds: Normal breath sounds.   Abdominal:      General: Bowel sounds are normal. There is no distension.      Palpations: Abdomen is soft. There is no mass.      Tenderness: There is no abdominal tenderness.   Musculoskeletal: Normal range  of motion.   Lymphadenopathy:      Cervical: No cervical adenopathy.      Upper Body:      Right upper body: No supraclavicular adenopathy.      Left upper body: No supraclavicular adenopathy.   Skin:     General: Skin is warm and dry.      Capillary Refill: Capillary refill takes less than 2 seconds.      Findings: No rash.   Neurological:      Mental Status: She is alert and oriented to person, place, and time.      Sensory: No sensory deficit.      Coordination: Coordination normal.      Gait: Gait normal.   Psychiatric:         Mood and Affect: Mood is not anxious or depressed.         Speech: Speech normal.         Behavior: Behavior normal.         Thought Content: Thought content normal.         Judgment: Judgment normal.         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  --extensive discussion with patient in regard to genetic testing and legal implications of a positive result, also discussed with patient responsibility of notifying other blood relatives if a positive result is obtained.  Discussed with patient results could take 4-6 weeks before completion and she will be notified when done.  Will schedule a follow-up visit at that time to  discuss results extensively in person.  --patient does not have any family history of cancer to her knowledge.

## 2020-09-08 ENCOUNTER — OFFICE VISIT (OUTPATIENT)
Dept: PALLIATIVE MEDICINE | Facility: CLINIC | Age: 50
End: 2020-09-08
Payer: MEDICAID

## 2020-09-08 VITALS
RESPIRATION RATE: 20 BRPM | DIASTOLIC BLOOD PRESSURE: 86 MMHG | TEMPERATURE: 98 F | SYSTOLIC BLOOD PRESSURE: 128 MMHG | HEART RATE: 92 BPM

## 2020-09-08 DIAGNOSIS — R11.2 NON-INTRACTABLE VOMITING WITH NAUSEA, UNSPECIFIED VOMITING TYPE: Primary | ICD-10-CM

## 2020-09-08 DIAGNOSIS — G89.3 CANCER ASSOCIATED PAIN: ICD-10-CM

## 2020-09-08 PROCEDURE — 99214 PR OFFICE/OUTPT VISIT, EST, LEVL IV, 30-39 MIN: ICD-10-PCS | Mod: S$PBB,,, | Performed by: FAMILY MEDICINE

## 2020-09-08 PROCEDURE — 99213 OFFICE O/P EST LOW 20 MIN: CPT | Mod: PBBFAC | Performed by: FAMILY MEDICINE

## 2020-09-08 PROCEDURE — 99999 PR PBB SHADOW E&M-EST. PATIENT-LVL III: ICD-10-PCS | Mod: PBBFAC,,, | Performed by: FAMILY MEDICINE

## 2020-09-08 PROCEDURE — 99999 PR PBB SHADOW E&M-EST. PATIENT-LVL III: CPT | Mod: PBBFAC,,, | Performed by: FAMILY MEDICINE

## 2020-09-08 PROCEDURE — 99214 OFFICE O/P EST MOD 30 MIN: CPT | Mod: S$PBB,,, | Performed by: FAMILY MEDICINE

## 2020-09-08 RX ORDER — HYDROCODONE BITARTRATE AND ACETAMINOPHEN 10; 325 MG/1; MG/1
1 TABLET ORAL EVERY 4 HOURS PRN
Qty: 120 TABLET | Refills: 0 | Status: SHIPPED | OUTPATIENT
Start: 2020-09-08 | End: 2020-10-12 | Stop reason: SDUPTHER

## 2020-09-08 RX ORDER — ONDANSETRON 4 MG/1
4-8 TABLET, FILM COATED ORAL 2 TIMES DAILY
Qty: 30 TABLET | Refills: 1 | Status: SHIPPED | OUTPATIENT
Start: 2020-09-08 | End: 2021-05-15 | Stop reason: SDUPTHER

## 2020-09-08 NOTE — PROGRESS NOTES
Subjective:       Patient ID: Josey Flores is a 49 y.o. female.    Chief Complaint: palliative care f/u    48 yo female seen for palliative f/u. She is having improvement in sleep since starting MS Contin 15 mg at bedtime but does not take in the morning because of sedating side effect. Has been coping well with hydrocodone 3-4 doses per day with MS Contin at night. She denies any bothersome side effects. BMs have been normal--not requiring medication for bowel. She has nausea every now and then and requests medication for nausea. Has tried pepto bismol without relief. Nausea occasionally accompanied by vomiting.     does not have any pertinent problems on file.  Past Medical History:   Diagnosis Date    Anxiety     Asthma     Cancer     right breast, metastatic-lymph nodes, bone, and thyroid     COPD (chronic obstructive pulmonary disease)     Depression     History of psychiatric hospitalization     2000; suicidal ideation    Hx of psychiatric care     Hypothyroidism     Miscarriage     five miscarriages    Obesity     Psychiatric problem     Thyroid cancer 2017     Past Surgical History:   Procedure Laterality Date    ABSCESS DRAINAGE      bilateral axilla    ADENOIDECTOMY      APPENDECTOMY      CHOLECYSTECTOMY      MASS EXCISION      SKIN GRAFT  06/16/2017    THYROIDECTOMY Bilateral     TONSILLECTOMY       Family History   Problem Relation Age of Onset    Arthritis Mother     Early death Mother         64 at time of death    Heart attack Mother     Heart disease Mother     Heart failure Mother     Hypertension Mother     Coronary artery disease Father     Hearing loss Father     Heart disease Father     Hyperlipidemia Father     Hypertension Father     Mental illness Father     Learning disabilities Son     Cancer Maternal Aunt      Social History     Socioeconomic History    Marital status:      Spouse name: Not on file    Number of children: 2    Years of  education: Not on file    Highest education level: Not on file   Occupational History    Not on file   Social Needs    Financial resource strain: Not on file    Food insecurity     Worry: Not on file     Inability: Not on file    Transportation needs     Medical: Not on file     Non-medical: Not on file   Tobacco Use    Smoking status: Current Every Day Smoker     Packs/day: 1.00     Years: 32.00     Pack years: 32.00     Types: Cigarettes     Start date: 1/1/1985     Last attempt to quit: 6/11/2017     Years since quitting: 3.2    Smokeless tobacco: Never Used    Tobacco comment: 45 pack year history   Substance and Sexual Activity    Alcohol use: No    Drug use: No    Sexual activity: Yes     Partners: Male   Lifestyle    Physical activity     Days per week: Not on file     Minutes per session: Not on file    Stress: Not on file   Relationships    Social connections     Talks on phone: Not on file     Gets together: Not on file     Attends [employer]od[employer] service: Not on file     Active member of club or organization: Not on file     Attends meetings of clubs or organizations: Not on file     Relationship status: Not on file   Other Topics Concern    Patient feels they ought to cut down on drinking/drug use Not Asked    Patient annoyed by others criticizing their drinking/drug use Not Asked    Patient has felt bad or guilty about drinking/drug use Not Asked    Patient has had a drink/used drugs as an eye opener in the AM Not Asked   Social History Narrative     with two adult children; common law marriage (10+years); raised [employer], no current [employer] practice     Review of Systems   A comprehensive 14-point review of systems was reviewed with patient and was negative other than as specified above.     Objective:     Vitals:    09/08/20 1555   BP: 128/86   Pulse: 92   Resp: 20   Temp: 98.1 °F (36.7 °C)        Physical Exam  Vitals signs and nursing note reviewed.   Constitutional:        Appearance: She is well-developed.   HENT:      Head: Normocephalic and atraumatic.   Eyes:      Pupils: Pupils are equal, round, and reactive to light.   Neck:      Musculoskeletal: Normal range of motion and neck supple.   Cardiovascular:      Rate and Rhythm: Normal rate and regular rhythm.   Pulmonary:      Effort: Pulmonary effort is normal.      Breath sounds: Normal breath sounds.   Musculoskeletal: Normal range of motion.         General: No deformity.   Skin:     General: Skin is warm and dry.   Neurological:      Mental Status: She is alert and oriented to person, place, and time. Mental status is at baseline.   Psychiatric:         Mood and Affect: Mood normal.         Behavior: Behavior normal.         Review of Symptoms    Symptom Assessment (ESAS 0-10 Scale)  Pain:  6  Dyspnea:  0  Anxiety:  1  Nausea:  7  Depression:  0  Anorexia:  4  Fatigue:  0  Insomnia:  0  Restlessness:  0  Agitation:  0         Pain Assessment:  Location(s): chest    Chest       Chest Location:  Right    OME in 24 hours:  55    ECOG Performance Status Grade:  1 - Ambulates, capable of light work    Living Arrangements:  Lives with spouse    Advance Care Planning   Advance Directives:   Living Will: Yes        Copy on chart: No           Assessment:       1. Non-intractable vomiting with nausea, unspecified vomiting type    2. Cancer associated pain        Plan:           Problem List Items Addressed This Visit     None      Visit Diagnoses     Non-intractable vomiting with nausea, unspecified vomiting type    -  Primary    Relevant Medications    ondansetron (ZOFRAN) 4 MG tablet    Cancer associated pain        Relevant Medications    HYDROcodone-acetaminophen (NORCO)  mg per tablet      Will see back in 1 month. Will move pharmacy to [facility]. We've had an ACP discussion at our last visit and revisited this idea today. She will bring her existing copy of her living will to scan in and is considering doing [place].

## 2020-09-09 ENCOUNTER — OFFICE VISIT (OUTPATIENT)
Dept: HEMATOLOGY/ONCOLOGY | Facility: CLINIC | Age: 50
End: 2020-09-09
Payer: MEDICAID

## 2020-09-09 ENCOUNTER — INFUSION (OUTPATIENT)
Dept: INFUSION THERAPY | Facility: HOSPITAL | Age: 50
End: 2020-09-09
Attending: INTERNAL MEDICINE
Payer: MEDICAID

## 2020-09-09 ENCOUNTER — LAB VISIT (OUTPATIENT)
Dept: LAB | Facility: HOSPITAL | Age: 50
End: 2020-09-09
Attending: INTERNAL MEDICINE
Payer: MEDICAID

## 2020-09-09 VITALS
DIASTOLIC BLOOD PRESSURE: 74 MMHG | OXYGEN SATURATION: 98 % | HEIGHT: 67 IN | HEART RATE: 85 BPM | WEIGHT: 221.13 LBS | SYSTOLIC BLOOD PRESSURE: 111 MMHG | RESPIRATION RATE: 18 BRPM | TEMPERATURE: 97 F | BODY MASS INDEX: 34.71 KG/M2

## 2020-09-09 VITALS
SYSTOLIC BLOOD PRESSURE: 98 MMHG | HEART RATE: 69 BPM | TEMPERATURE: 98 F | OXYGEN SATURATION: 98 % | DIASTOLIC BLOOD PRESSURE: 60 MMHG | RESPIRATION RATE: 18 BRPM

## 2020-09-09 DIAGNOSIS — C79.51 SECONDARY CANCER OF BONE: ICD-10-CM

## 2020-09-09 DIAGNOSIS — Z17.0 MALIGNANT NEOPLASM OF OVERLAPPING SITES OF RIGHT BREAST IN FEMALE, ESTROGEN RECEPTOR POSITIVE: ICD-10-CM

## 2020-09-09 DIAGNOSIS — C73 PAPILLARY THYROID CARCINOMA: ICD-10-CM

## 2020-09-09 DIAGNOSIS — C50.811 MALIGNANT NEOPLASM OF OVERLAPPING SITES OF RIGHT BREAST IN FEMALE, ESTROGEN RECEPTOR POSITIVE: ICD-10-CM

## 2020-09-09 DIAGNOSIS — D75.89 MACROCYTOSIS: ICD-10-CM

## 2020-09-09 DIAGNOSIS — C77.3 MALIGNANT NEOPLASM METASTATIC TO LYMPH NODE OF AXILLA: Primary | ICD-10-CM

## 2020-09-09 DIAGNOSIS — C79.51 SECONDARY CANCER OF BONE: Primary | ICD-10-CM

## 2020-09-09 LAB
ALBUMIN SERPL BCP-MCNC: 3.9 G/DL (ref 3.5–5.2)
ALP SERPL-CCNC: 62 U/L (ref 55–135)
ALT SERPL W/O P-5'-P-CCNC: 24 U/L (ref 10–44)
ANION GAP SERPL CALC-SCNC: 8 MMOL/L (ref 8–16)
AST SERPL-CCNC: 19 U/L (ref 10–40)
BASOPHILS # BLD AUTO: 0.05 K/UL (ref 0–0.2)
BASOPHILS NFR BLD: 0.9 % (ref 0–1.9)
BILIRUB SERPL-MCNC: 0.6 MG/DL (ref 0.1–1)
BUN SERPL-MCNC: 8 MG/DL (ref 6–20)
CALCIUM SERPL-MCNC: 9.5 MG/DL (ref 8.7–10.5)
CHLORIDE SERPL-SCNC: 103 MMOL/L (ref 95–110)
CO2 SERPL-SCNC: 29 MMOL/L (ref 23–29)
CREAT SERPL-MCNC: 1.2 MG/DL (ref 0.5–1.4)
DIFFERENTIAL METHOD: ABNORMAL
EOSINOPHIL # BLD AUTO: 0.1 K/UL (ref 0–0.5)
EOSINOPHIL NFR BLD: 1.7 % (ref 0–8)
ERYTHROCYTE [DISTWIDTH] IN BLOOD BY AUTOMATED COUNT: 13.3 % (ref 11.5–14.5)
EST. GFR  (AFRICAN AMERICAN): >60 ML/MIN/1.73 M^2
EST. GFR  (NON AFRICAN AMERICAN): 52 ML/MIN/1.73 M^2
GLUCOSE SERPL-MCNC: 109 MG/DL (ref 70–110)
HCT VFR BLD AUTO: 38.5 % (ref 37–48.5)
HGB BLD-MCNC: 12.9 G/DL (ref 12–16)
IMM GRANULOCYTES # BLD AUTO: 0.04 K/UL (ref 0–0.04)
IMM GRANULOCYTES NFR BLD AUTO: 0.8 % (ref 0–0.5)
LYMPHOCYTES # BLD AUTO: 1.4 K/UL (ref 1–4.8)
LYMPHOCYTES NFR BLD: 26.7 % (ref 18–48)
MCH RBC QN AUTO: 37.3 PG (ref 27–31)
MCHC RBC AUTO-ENTMCNC: 33.5 G/DL (ref 32–36)
MCV RBC AUTO: 111 FL (ref 82–98)
MONOCYTES # BLD AUTO: 0.4 K/UL (ref 0.3–1)
MONOCYTES NFR BLD: 7.6 % (ref 4–15)
NEUTROPHILS # BLD AUTO: 3.3 K/UL (ref 1.8–7.7)
NEUTROPHILS NFR BLD: 62.3 % (ref 38–73)
NRBC BLD-RTO: 0 /100 WBC
PLATELET # BLD AUTO: 225 K/UL (ref 150–350)
PMV BLD AUTO: 10.9 FL (ref 9.2–12.9)
POTASSIUM SERPL-SCNC: 4.2 MMOL/L (ref 3.5–5.1)
PROT SERPL-MCNC: 7.2 G/DL (ref 6–8.4)
RBC # BLD AUTO: 3.46 M/UL (ref 4–5.4)
SODIUM SERPL-SCNC: 140 MMOL/L (ref 136–145)
WBC # BLD AUTO: 5.28 K/UL (ref 3.9–12.7)

## 2020-09-09 PROCEDURE — 99214 OFFICE O/P EST MOD 30 MIN: CPT | Mod: PBBFAC,25 | Performed by: INTERNAL MEDICINE

## 2020-09-09 PROCEDURE — 99215 OFFICE O/P EST HI 40 MIN: CPT | Mod: S$PBB,,, | Performed by: INTERNAL MEDICINE

## 2020-09-09 PROCEDURE — 85025 COMPLETE CBC W/AUTO DIFF WBC: CPT

## 2020-09-09 PROCEDURE — 99215 PR OFFICE/OUTPT VISIT, EST, LEVL V, 40-54 MIN: ICD-10-PCS | Mod: S$PBB,,, | Performed by: INTERNAL MEDICINE

## 2020-09-09 PROCEDURE — 96365 THER/PROPH/DIAG IV INF INIT: CPT

## 2020-09-09 PROCEDURE — 80053 COMPREHEN METABOLIC PANEL: CPT

## 2020-09-09 PROCEDURE — 36415 COLL VENOUS BLD VENIPUNCTURE: CPT

## 2020-09-09 PROCEDURE — 99999 PR PBB SHADOW E&M-EST. PATIENT-LVL IV: ICD-10-PCS | Mod: PBBFAC,,, | Performed by: INTERNAL MEDICINE

## 2020-09-09 PROCEDURE — 63600175 PHARM REV CODE 636 W HCPCS: Performed by: INTERNAL MEDICINE

## 2020-09-09 PROCEDURE — 99999 PR PBB SHADOW E&M-EST. PATIENT-LVL IV: CPT | Mod: PBBFAC,,, | Performed by: INTERNAL MEDICINE

## 2020-09-09 RX ORDER — HEPARIN 100 UNIT/ML
500 SYRINGE INTRAVENOUS
Status: CANCELLED | OUTPATIENT
Start: 2020-09-13

## 2020-09-09 RX ORDER — ZOLEDRONIC ACID 0.04 MG/ML
4 INJECTION, SOLUTION INTRAVENOUS
Status: COMPLETED | OUTPATIENT
Start: 2020-09-09 | End: 2020-09-09

## 2020-09-09 RX ORDER — SODIUM CHLORIDE 0.9 % (FLUSH) 0.9 %
10 SYRINGE (ML) INJECTION
Status: CANCELLED | OUTPATIENT
Start: 2020-09-13

## 2020-09-09 RX ADMIN — ZOLEDRONIC ACID 4 MG: 0.04 INJECTION, SOLUTION INTRAVENOUS at 12:09

## 2020-09-09 NOTE — PROGRESS NOTES
Subjective:      DATE OF VISIT: 9/9/2020   ?   ?   Patient ID:?Josey Flores is a 49 y.o. female.?? MR#: [id number]   ?   PRIMARY PROVIDER: Dr. Asencio  ?   CHIEF COMPLAINT:  Follow-up  ?   ONCOLOGIC DIAGNOSIS:      stage IV, T2 N1b M1, invasive breast carcinoma, ER/LA+, HER2-    Papillary thyroid carcinoma status post total thyroidectomy on 08/31/2017, mpT1b N0    Cervical HSIL MIREILLE 3 s/p LEEP, 2017  ?   CURRENT TREATMENT: palbociclib and letrozole    Follow-up      She presents today with her partner doing well without acute events.  No issues with infectious symptoms, fatigue, new areas of pain.  She is receiving medications through the mail well and taking palbociclib and letrozole as prescribed.    ONCOLOGIC HISTORY:      initial diagnosis after self palpation of mass near right nipple.  Further imaging revealed two lesions in right breast, axillary lymph node.  PET-CT in January 2017 revealed metastatic disease involving 2 lesions in right breast, right axilla, and metastatic disease in manubrium as well as left posterior ilium.  Thyroid was also noted to have avidity.  This was found to be papillary thyroid cancer and she underwent total thyroidectomy.  She notes development of bilateral upper extremity edema and underwent palliative bilateral axillary resection as well as resection of metastatic focus in left ilial region per patient report.     She also has a history of HSIL MIREILLE 3 status post LEEP in 2017.     Review of Systems    ?   A comprehensive 14-point review of systems was reviewed with patient and was negative other than as specified above.   ?     Objective:      Physical Exam      ?   Vitals:    09/09/20 1047   BP: 111/74   Pulse: 85   Resp: 18   Temp: 97.4 °F (36.3 °C)   General appearance: Generally well appearing, in no acute distress.   Head, eyes, ears, nose, and throat: Oropharynx clear with moist mucous membranes.   Cardiovascular: Regular rate and rhythm, S1, S2, no audible murmurs.    Respiratory: Lungs clear to auscultation bilaterally.   Abdomen: nontender, nondistended.   Extremities: Warm, without edema.   Neurologic: Alert and oriented.  Skin: No rashes, ecchymoses or petechial lesion.   Psychiatric: normal mood and affect, conversant and appropriate    Laboratory:  ?   Lab Visit on 09/09/2020   Component Date Value Ref Range Status    WBC 09/09/2020 5.28  3.90 - 12.70 K/uL Final    RBC 09/09/2020 3.46* 4.00 - 5.40 M/uL Final    Hemoglobin 09/09/2020 12.9  12.0 - 16.0 g/dL Final    Hematocrit 09/09/2020 38.5  37.0 - 48.5 % Final    Mean Corpuscular Volume 09/09/2020 111* 82 - 98 fL Final    Mean Corpuscular Hemoglobin 09/09/2020 37.3* 27.0 - 31.0 pg Final    Mean Corpuscular Hemoglobin Conc 09/09/2020 33.5  32.0 - 36.0 g/dL Final    RDW 09/09/2020 13.3  11.5 - 14.5 % Final    Platelets 09/09/2020 225  150 - 350 K/uL Final    MPV 09/09/2020 10.9  9.2 - 12.9 fL Final    Immature Granulocytes 09/09/2020 0.8* 0.0 - 0.5 % Final    Gran # (ANC) 09/09/2020 3.3  1.8 - 7.7 K/uL Final    Immature Grans (Abs) 09/09/2020 0.04  0.00 - 0.04 K/uL Final    Lymph # 09/09/2020 1.4  1.0 - 4.8 K/uL Final    Mono # 09/09/2020 0.4  0.3 - 1.0 K/uL Final    Eos # 09/09/2020 0.1  0.0 - 0.5 K/uL Final    Baso # 09/09/2020 0.05  0.00 - 0.20 K/uL Final    nRBC 09/09/2020 0  0 /100 WBC Final    Gran% 09/09/2020 62.3  38.0 - 73.0 % Final    Lymph% 09/09/2020 26.7  18.0 - 48.0 % Final    Mono% 09/09/2020 7.6  4.0 - 15.0 % Final    Eosinophil% 09/09/2020 1.7  0.0 - 8.0 % Final    Basophil% 09/09/2020 0.9  0.0 - 1.9 % Final    Differential Method 09/09/2020 Automated   Final    Sodium 09/09/2020 140  136 - 145 mmol/L Final    Potassium 09/09/2020 4.2  3.5 - 5.1 mmol/L Final    Chloride 09/09/2020 103  95 - 110 mmol/L Final    CO2 09/09/2020 29  23 - 29 mmol/L Final    Glucose 09/09/2020 109  70 - 110 mg/dL Final    BUN, Bld 09/09/2020 8  6 - 20 mg/dL Final    Creatinine 09/09/2020 1.2  0.5 -  1.4 mg/dL Final    Calcium 09/09/2020 9.5  8.7 - 10.5 mg/dL Final    Total Protein 09/09/2020 7.2  6.0 - 8.4 g/dL Final    Albumin 09/09/2020 3.9  3.5 - 5.2 g/dL Final    Total Bilirubin 09/09/2020 0.6  0.1 - 1.0 mg/dL Final    Alkaline Phosphatase 09/09/2020 62  55 - 135 U/L Final    AST 09/09/2020 19  10 - 40 U/L Final    ALT 09/09/2020 24  10 - 44 U/L Final    Anion Gap 09/09/2020 8  8 - 16 mmol/L Final    eGFR if African American 09/09/2020 >60  >60 mL/min/1.73 m^2 Final    eGFR if non African American 09/09/2020 52* >60 mL/min/1.73 m^2 Final      ? IMAGING     8/11/20  CT CHEST ABDOMEN PELVIS WITH CONTRAST (XPD)     CLINICAL HISTORY:  Restaging CT; Secondary and unspecified malignant neoplasm of axilla and upper limb lymph nodes     TECHNIQUE:  Low dose axial images, sagittal and coronal reformations were obtained from the thoracic inlet to the pubic symphysis following the IV administration of 100 mL of Omnipaque 350 and the oral administration of 30 ml of Omnipaque 350.     COMPARISON:  02/26/2020     FINDINGS:  Chest:     Heart and great vessels: Within normal limits     Adenopathy: Calcified mediastinal and right hilar lymph nodes are again demonstrated.  No thoracic adenopathy demonstrated by size criteria.     Lungs: 6 mm noncalcified nodule in the periphery of the right upper lobe is unchanged.  Calcified granuloma again noted in the right upper lobe.  No new pulmonary nodules demonstrated.  Prominent centrilobular emphysematous changes are again demonstrated in the apices.  No parenchymal consolidations or pleural effusions.     Abdomen:     Liver: Large in size with no focal lesions visualized.     Gallbladder and biliary: Prior cholecystectomy     Spleen: Within normal limits.     Pancreas: Within normal limits.     Adrenals: Within normal limits.     Kidneys: Within normal limits.     Bowel: No abnormal bowel wall thickening or evidence of obstruction.  Appendix is not  visualized.     Peritoneum: No ascites or pneumoperitoneum.     Abdominal Adenopathy: None.     Vasculature: Within normal limits.     Pelvis:     Urinary bladder: Unremarkable.     Uterus and adnexa: Grossly within normal limits     Pelvis adenopathy: None.     Bones: Previously described lytic lesions involving the bilateral iliacs and manubrium appear unchanged.  Few small mixed lytic and sclerotic foci in the thoracic spine also appear unchanged.  No new osseous lesions visualized.     Miscellaneous: None.     Impression:     1. Noncalcified pulmonary nodule in the right upper lobe appears unchanged measuring 6 mm.  2. Known multifocal osseous metastatic disease is unchanged in appearance.  3. Other stable findings as above    ?   Assessment/Plan:       1. Malignant neoplasm metastatic to lymph node of axilla    2. Secondary cancer of bone    3. Papillary thyroid carcinoma    4. Macrocytosis          Plan:     # metastatic breast cancer ER positive HER2 negative:  Continued on systemic therapy with palbociclib and letrozole, tolerating well.  No new clinical symptoms or concerns, new pain or blood count abnormalities, stable cytopenias as expected with therapy.  I reviewed with her results of CT restaging exam showing stable bony abnormalities.  I did discuss given stability unclear if there is any active bony disease and can consider PET for next restaging scan.  Of note it does not appear she has ever had germline or somatic testing on tumor and have offered this given young age of diagnosis as well as concomitant papillary thyroid carcinoma. I did discuss sending for somatic testing with [facility] however may be more useful to send if evidence of progression in which case rebiopsy would be indicated to reassess marker status and mutational changes at that time to guide therapy.  She was amenable to this plan and continuing her current therapy which has been keeping her disease under excellent control without  notable toxicity.  Labs without notable cytopenia or other abnormality.  Given stability over several years reasonable to decrease frequency of lab checks every 3 months along with her zoledronic acid.    # macrocytosis:  Without anemia, stable macrocytosis 1 10 May be medication effect.  Differential diagnosis includes vitamin B12 deficiency although normal in 2019, bone marrow disorder.  Will continue to monitor and further workup if develops anemia or other concerning symptoms.    # thyroid function abnormality:  Ultrasound and CT neck earlier this year within normal limits.  Repeat TSH and free T4 now within normal limits.  Encouraged her to continue with current levothyroxine dosing and follow-up with Endocrinology.    # Chronic cancer related pain:  Following with palliative Care, well managed with 1 morphine in 1 Norco daily on average, improved sleep.    # bony metastatic disease:  Given stabilize bony disease I discuss option of switching from denosumab to zoledronic acid dosed every 12 weeks, will give 1st dose today 4 mg Q 12 weeks.  Labs reviewed normal calcium and renal function.  Okay to proceed as planned.    # HSIL:  Status post LEEP 2017, encouraged follow-up with her gynecologist for surveillance, no current acute issues/vaginal bleeding.    # history of papillary thyroid carcinoma status post total thyroidectomy on 08/31/2017: s/p total thyroidectomy on levothyroxine.  Metastatic breast cancer restaging imaging without findings for locally recurrent disease.  Thyroid function abnormality appears to have resolved per above.    Follow-Up:   ZA today  Revisit in 3 months with labs same day prior

## 2020-09-10 ENCOUNTER — HOSPITAL ENCOUNTER (EMERGENCY)
Facility: HOSPITAL | Age: 50
Discharge: HOME | End: 2020-09-11
Attending: FAMILY MEDICINE
Payer: MEDICAID

## 2020-09-10 ENCOUNTER — TELEPHONE (OUTPATIENT)
Dept: PHARMACY | Facility: CLINIC | Age: 50
End: 2020-09-10

## 2020-09-10 VITALS
RESPIRATION RATE: 17 BRPM | DIASTOLIC BLOOD PRESSURE: 84 MMHG | TEMPERATURE: 98 F | WEIGHT: 220 LBS | HEIGHT: 67 IN | SYSTOLIC BLOOD PRESSURE: 130 MMHG | OXYGEN SATURATION: 97 % | BODY MASS INDEX: 34.53 KG/M2 | HEART RATE: 78 BPM

## 2020-09-10 DIAGNOSIS — R00.0 TACHYCARDIA: ICD-10-CM

## 2020-09-10 DIAGNOSIS — K52.9 ENTERITIS: Primary | ICD-10-CM

## 2020-09-10 LAB
ALBUMIN SERPL BCP-MCNC: 3.8 G/DL (ref 3.5–5.2)
ALP SERPL-CCNC: 58 U/L (ref 55–135)
ALT SERPL W/O P-5'-P-CCNC: 22 U/L (ref 10–44)
ANION GAP SERPL CALC-SCNC: 9 MMOL/L (ref 8–16)
APTT BLDCRRT: 28.8 SEC (ref 21–32)
AST SERPL-CCNC: 17 U/L (ref 10–40)
BACTERIA #/AREA URNS HPF: NORMAL /HPF
BASOPHILS # BLD AUTO: 0.03 K/UL (ref 0–0.2)
BASOPHILS NFR BLD: 0.7 % (ref 0–1.9)
BILIRUB SERPL-MCNC: 0.6 MG/DL (ref 0.1–1)
BILIRUB UR QL STRIP: NEGATIVE
BNP SERPL-MCNC: 18 PG/ML (ref 0–99)
BUN SERPL-MCNC: 8 MG/DL (ref 6–20)
CALCIUM SERPL-MCNC: 9.6 MG/DL (ref 8.7–10.5)
CHLORIDE SERPL-SCNC: 104 MMOL/L (ref 95–110)
CLARITY UR: CLEAR
CO2 SERPL-SCNC: 24 MMOL/L (ref 23–29)
COLOR UR: YELLOW
CREAT SERPL-MCNC: 1.2 MG/DL (ref 0.5–1.4)
DIFFERENTIAL METHOD: ABNORMAL
EOSINOPHIL # BLD AUTO: 0.1 K/UL (ref 0–0.5)
EOSINOPHIL NFR BLD: 1.2 % (ref 0–8)
ERYTHROCYTE [DISTWIDTH] IN BLOOD BY AUTOMATED COUNT: 13.1 % (ref 11.5–14.5)
EST. GFR  (AFRICAN AMERICAN): >60 ML/MIN/1.73 M^2
EST. GFR  (NON AFRICAN AMERICAN): 52 ML/MIN/1.73 M^2
GLUCOSE SERPL-MCNC: 133 MG/DL (ref 70–110)
GLUCOSE UR QL STRIP: NEGATIVE
HCT VFR BLD AUTO: 38.7 % (ref 37–48.5)
HGB BLD-MCNC: 13.4 G/DL (ref 12–16)
HGB UR QL STRIP: ABNORMAL
IMM GRANULOCYTES # BLD AUTO: 0.03 K/UL (ref 0–0.04)
IMM GRANULOCYTES NFR BLD AUTO: 0.7 % (ref 0–0.5)
INR PPP: 1 (ref 0.8–1.2)
KETONES UR QL STRIP: NEGATIVE
LACTATE SERPL-SCNC: 1.5 MMOL/L (ref 0.5–2.2)
LEUKOCYTE ESTERASE UR QL STRIP: ABNORMAL
LIPASE SERPL-CCNC: 31 U/L (ref 4–60)
LYMPHOCYTES # BLD AUTO: 0.5 K/UL (ref 1–4.8)
LYMPHOCYTES NFR BLD: 12.5 % (ref 18–48)
MAGNESIUM SERPL-MCNC: 2 MG/DL (ref 1.6–2.6)
MCH RBC QN AUTO: 37.7 PG (ref 27–31)
MCHC RBC AUTO-ENTMCNC: 34.6 G/DL (ref 32–36)
MCV RBC AUTO: 109 FL (ref 82–98)
MICROSCOPIC COMMENT: NORMAL
MONOCYTES # BLD AUTO: 0.2 K/UL (ref 0.3–1)
MONOCYTES NFR BLD: 4.5 % (ref 4–15)
NEUTROPHILS # BLD AUTO: 3.4 K/UL (ref 1.8–7.7)
NEUTROPHILS NFR BLD: 80.4 % (ref 38–73)
NITRITE UR QL STRIP: NEGATIVE
NRBC BLD-RTO: 0 /100 WBC
PH UR STRIP: 6 [PH] (ref 5–8)
PLATELET # BLD AUTO: 183 K/UL (ref 150–350)
PMV BLD AUTO: 11 FL (ref 9.2–12.9)
POTASSIUM SERPL-SCNC: 3.3 MMOL/L (ref 3.5–5.1)
PROCALCITONIN SERPL IA-MCNC: 0.12 NG/ML
PROT SERPL-MCNC: 7.3 G/DL (ref 6–8.4)
PROT UR QL STRIP: NEGATIVE
PROTHROMBIN TIME: 10.6 SEC (ref 9–12.5)
RBC # BLD AUTO: 3.55 M/UL (ref 4–5.4)
RBC #/AREA URNS HPF: 3 /HPF (ref 0–4)
SARS-COV-2 RDRP RESP QL NAA+PROBE: NEGATIVE
SODIUM SERPL-SCNC: 137 MMOL/L (ref 136–145)
SP GR UR STRIP: 1.02 (ref 1–1.03)
SQUAMOUS #/AREA URNS HPF: 3 /HPF
TROPONIN I SERPL DL<=0.01 NG/ML-MCNC: <0.006 NG/ML (ref 0–0.03)
URN SPEC COLLECT METH UR: ABNORMAL
UROBILINOGEN UR STRIP-ACNC: NEGATIVE EU/DL
WBC # BLD AUTO: 4.23 K/UL (ref 3.9–12.7)
WBC #/AREA URNS HPF: 3 /HPF (ref 0–5)

## 2020-09-10 PROCEDURE — 84484 ASSAY OF TROPONIN QUANT: CPT

## 2020-09-10 PROCEDURE — 85730 THROMBOPLASTIN TIME PARTIAL: CPT

## 2020-09-10 PROCEDURE — 93005 ELECTROCARDIOGRAM TRACING: CPT

## 2020-09-10 PROCEDURE — 93010 ELECTROCARDIOGRAM REPORT: CPT | Mod: ,,, | Performed by: INTERNAL MEDICINE

## 2020-09-10 PROCEDURE — 83690 ASSAY OF LIPASE: CPT

## 2020-09-10 PROCEDURE — 84145 PROCALCITONIN (PCT): CPT

## 2020-09-10 PROCEDURE — 87040 BLOOD CULTURE FOR BACTERIA: CPT | Mod: 59

## 2020-09-10 PROCEDURE — 96360 HYDRATION IV INFUSION INIT: CPT

## 2020-09-10 PROCEDURE — 36415 COLL VENOUS BLD VENIPUNCTURE: CPT

## 2020-09-10 PROCEDURE — 85025 COMPLETE CBC W/AUTO DIFF WBC: CPT

## 2020-09-10 PROCEDURE — 99285 EMERGENCY DEPT VISIT HI MDM: CPT | Mod: 25

## 2020-09-10 PROCEDURE — 83605 ASSAY OF LACTIC ACID: CPT

## 2020-09-10 PROCEDURE — 25500020 PHARM REV CODE 255: Performed by: FAMILY MEDICINE

## 2020-09-10 PROCEDURE — 80053 COMPREHEN METABOLIC PANEL: CPT

## 2020-09-10 PROCEDURE — 81000 URINALYSIS NONAUTO W/SCOPE: CPT

## 2020-09-10 PROCEDURE — 96361 HYDRATE IV INFUSION ADD-ON: CPT

## 2020-09-10 PROCEDURE — 83735 ASSAY OF MAGNESIUM: CPT

## 2020-09-10 PROCEDURE — U0002 COVID-19 LAB TEST NON-CDC: HCPCS

## 2020-09-10 PROCEDURE — 25000003 PHARM REV CODE 250: Performed by: FAMILY MEDICINE

## 2020-09-10 PROCEDURE — 93010 EKG 12-LEAD: ICD-10-PCS | Mod: ,,, | Performed by: INTERNAL MEDICINE

## 2020-09-10 PROCEDURE — 83880 ASSAY OF NATRIURETIC PEPTIDE: CPT

## 2020-09-10 PROCEDURE — 85610 PROTHROMBIN TIME: CPT

## 2020-09-10 RX ORDER — ACETAMINOPHEN 325 MG/1
650 TABLET ORAL
Status: COMPLETED | OUTPATIENT
Start: 2020-09-10 | End: 2020-09-10

## 2020-09-10 RX ORDER — DICYCLOMINE HYDROCHLORIDE 20 MG/1
20 TABLET ORAL 2 TIMES DAILY
Qty: 20 TABLET | Refills: 0 | Status: SHIPPED | OUTPATIENT
Start: 2020-09-10 | End: 2020-09-20

## 2020-09-10 RX ADMIN — ACETAMINOPHEN 650 MG: 325 TABLET ORAL at 09:09

## 2020-09-10 RX ADMIN — IOHEXOL 100 ML: 350 INJECTION, SOLUTION INTRAVENOUS at 09:09

## 2020-09-10 RX ADMIN — SODIUM CHLORIDE 1000 ML: 0.9 INJECTION, SOLUTION INTRAVENOUS at 10:09

## 2020-09-11 NOTE — ED PROVIDER NOTES
SCRIBE #1 NOTE: I, Hung Herrera, am scribing for, and in the presence of, Marie Trejo MD. I have scribed the entire note.       History     Chief Complaint   Patient presents with    Fever     States fever 100.4 lasst night.  Complains of headache and body aches today.  States hx of stage 4 cancer     Review of patient's allergies indicates:   Allergen Reactions    Nsaids (non-steroidal anti-inflammatory drug) Other (See Comments)     Instructed not to take NSAID drugs with chemo pill.    Vancomycin analogues Itching         History of Present Illness     HPI    9/10/2020, 8:53 PM  History obtained from the patient      History of Present Illness: Josey Flores is a 49 y.o. female patient with a PMHx of stage 4 CA, appendectomy, cholecystectomy, COPD who presents to the Emergency Department for evaluation of intermittent fever onset today. This morning at 3 AM, pt reports waking up shivering. She took her temperature which read 100.4 so she took tylenol. No mitigating or exacerbating factors reported. Associated sxs include HA, abd pain, cough, diarrhea onset 2 days ago, and body aches. Patient denies any CP, SOB, n/v, and all other sxs at this time. No further complaints or concerns at this time.       Arrival mode: Personal vehicle    PCP: Primary Doctor No        Past Medical History:  Past Medical History:   Diagnosis Date    Anxiety     Asthma     Cancer     right breast, metastatic-lymph nodes, bone, and thyroid     COPD (chronic obstructive pulmonary disease)     Depression     History of psychiatric hospitalization     2000; suicidal ideation    Hx of psychiatric care     Hypothyroidism     Miscarriage     five miscarriages    Obesity     Psychiatric problem     Thyroid cancer 2017       Past Surgical History:  Past Surgical History:   Procedure Laterality Date    ABSCESS DRAINAGE      bilateral axilla    ADENOIDECTOMY      APPENDECTOMY      CHOLECYSTECTOMY      MASS EXCISION       SKIN GRAFT  06/16/2017    THYROIDECTOMY Bilateral     TONSILLECTOMY           Family History:  Family History   Problem Relation Age of Onset    Arthritis Mother     Early death Mother         64 at time of death    Heart attack Mother     Heart disease Mother     Heart failure Mother     Hypertension Mother     Coronary artery disease Father     Hearing loss Father     Heart disease Father     Hyperlipidemia Father     Hypertension Father     Mental illness Father     Learning disabilities Son     Cancer Maternal Aunt        Social History:  Social History     Tobacco Use    Smoking status: Current Every Day Smoker     Packs/day: 1.00     Years: 32.00     Pack years: 32.00     Types: Cigarettes     Start date: 1/1/1985     Last attempt to quit: 6/11/2017     Years since quitting: 3.2    Smokeless tobacco: Never Used    Tobacco comment: 45 pack year history   Substance and Sexual Activity    Alcohol use: No    Drug use: No    Sexual activity: Yes     Partners: Male        Review of Systems     Review of Systems   Constitutional: Positive for fever (Tmax 100.4). Negative for activity change, appetite change, chills and diaphoresis.   HENT: Negative for congestion, drooling, ear pain, mouth sores, rhinorrhea, sinus pain, sore throat and trouble swallowing.    Eyes: Negative for pain and discharge.   Respiratory: Positive for cough. Negative for chest tightness, shortness of breath, wheezing and stridor.    Cardiovascular: Negative for chest pain, palpitations and leg swelling.   Gastrointestinal: Positive for abdominal pain (RLQ) and diarrhea. Negative for abdominal distention, blood in stool, constipation, nausea and vomiting.   Genitourinary: Negative for difficulty urinating, dysuria, flank pain, frequency, hematuria and urgency.   Musculoskeletal: Negative for arthralgias, back pain and myalgias.        (+) generalized malaise   Skin: Negative for pallor, rash and wound.   Neurological:  "Positive for headaches. Negative for dizziness, syncope, weakness, light-headedness and numbness.   All other systems reviewed and are negative.       Physical Exam     Initial Vitals [09/10/20 1928]   BP Pulse Resp Temp SpO2   126/74 110 20 97.8 °F (36.6 °C) 96 %      MAP       --          Physical Exam  Nursing Notes and Vital Signs Reviewed.  Constitutional: Patient is in mild distress.  Head: Atraumatic. Normocephalic.  Eyes: PERRL. EOM intact. Conjunctivae are not pale. No scleral icterus.  ENT: Mucous membranes are dry. Oropharynx is clear and symmetric.    Neck: Negative Brudzinski sign. Negative Crohn's sign.  Cardiovascular: Regular rate. Regular rhythm. No murmurs, rubs, or gallops. Distal pulses are 2+ and symmetric.  Pulmonary/Chest: No respiratory distress. Clear to auscultation bilaterally. No wheezing or rales.  Abdominal: Soft and non-distended.  There is RLQ tenderness.  No rebound, guarding, or rigidity. Good bowel sounds.  Genitourinary: No CVA tenderness  Musculoskeletal: Moves all extremities. No obvious deformities. No edema. No calf tenderness.  Skin: Warm and dry.  Neurological:  Alert, awake, and appropriate.  Normal speech.  No acute focal neurological deficits are appreciated.  Psychiatric: Normal affect. Good eye contact. Appropriate in content.     ED Course   Procedures  ED Vital Signs:  Vitals:    09/10/20 1928 09/10/20 2002 09/10/20 2044 09/10/20 2046   BP: 126/74      Pulse: 110 96  90   Resp: 20   20   Temp: 97.8 °F (36.6 °C)  98.4 °F (36.9 °C)    TempSrc: Oral  Oral    SpO2: 96%   98%   Weight: 99.8 kg (220 lb 0.3 oz)      Height: 5' 7" (1.702 m)       09/10/20 2102 09/10/20 2132 09/10/20 2149 09/10/20 2352   BP: (!) 109/52 (!) 156/70  130/84   Pulse: 90 88 83 78   Resp: 20 (!) 28 (!) 29 17   Temp:    98 °F (36.7 °C)   TempSrc:    Oral   SpO2: 97% 96% 97% 97%   Weight:       Height:           Abnormal Lab Results:  Labs Reviewed   CBC W/ AUTO DIFFERENTIAL - Abnormal; Notable for " the following components:       Result Value    RBC 3.55 (*)     Mean Corpuscular Volume 109 (*)     Mean Corpuscular Hemoglobin 37.7 (*)     Immature Granulocytes 0.7 (*)     Lymph # 0.5 (*)     Mono # 0.2 (*)     Gran% 80.4 (*)     Lymph% 12.5 (*)     All other components within normal limits   COMPREHENSIVE METABOLIC PANEL - Abnormal; Notable for the following components:    Potassium 3.3 (*)     Glucose 133 (*)     eGFR if non  52 (*)     All other components within normal limits   URINALYSIS, REFLEX TO URINE CULTURE - Abnormal; Notable for the following components:    Occult Blood UA 2+ (*)     Leukocytes, UA Trace (*)     All other components within normal limits    Narrative:     Specimen Source->Urine   CULTURE, BLOOD   CULTURE, BLOOD   LACTIC ACID, PLASMA   MAGNESIUM   APTT   PROTIME-INR   B-TYPE NATRIURETIC PEPTIDE   LIPASE   TROPONIN I   PROCALCITONIN   SARS-COV-2 RNA AMPLIFICATION, QUAL   URINALYSIS MICROSCOPIC    Narrative:     Specimen Source->Urine        All Lab Results:  Results for orders placed or performed during the hospital encounter of 09/10/20   CBC auto differential   Result Value Ref Range    WBC 4.23 3.90 - 12.70 K/uL    RBC 3.55 (L) 4.00 - 5.40 M/uL    Hemoglobin 13.4 12.0 - 16.0 g/dL    Hematocrit 38.7 37.0 - 48.5 %    Mean Corpuscular Volume 109 (H) 82 - 98 fL    Mean Corpuscular Hemoglobin 37.7 (H) 27.0 - 31.0 pg    Mean Corpuscular Hemoglobin Conc 34.6 32.0 - 36.0 g/dL    RDW 13.1 11.5 - 14.5 %    Platelets 183 150 - 350 K/uL    MPV 11.0 9.2 - 12.9 fL    Immature Granulocytes 0.7 (H) 0.0 - 0.5 %    Gran # (ANC) 3.4 1.8 - 7.7 K/uL    Immature Grans (Abs) 0.03 0.00 - 0.04 K/uL    Lymph # 0.5 (L) 1.0 - 4.8 K/uL    Mono # 0.2 (L) 0.3 - 1.0 K/uL    Eos # 0.1 0.0 - 0.5 K/uL    Baso # 0.03 0.00 - 0.20 K/uL    nRBC 0 0 /100 WBC    Gran% 80.4 (H) 38.0 - 73.0 %    Lymph% 12.5 (L) 18.0 - 48.0 %    Mono% 4.5 4.0 - 15.0 %    Eosinophil% 1.2 0.0 - 8.0 %    Basophil% 0.7 0.0 - 1.9 %     Differential Method Automated    Comprehensive metabolic panel   Result Value Ref Range    Sodium 137 136 - 145 mmol/L    Potassium 3.3 (L) 3.5 - 5.1 mmol/L    Chloride 104 95 - 110 mmol/L    CO2 24 23 - 29 mmol/L    Glucose 133 (H) 70 - 110 mg/dL    BUN, Bld 8 6 - 20 mg/dL    Creatinine 1.2 0.5 - 1.4 mg/dL    Calcium 9.6 8.7 - 10.5 mg/dL    Total Protein 7.3 6.0 - 8.4 g/dL    Albumin 3.8 3.5 - 5.2 g/dL    Total Bilirubin 0.6 0.1 - 1.0 mg/dL    Alkaline Phosphatase 58 55 - 135 U/L    AST 17 10 - 40 U/L    ALT 22 10 - 44 U/L    Anion Gap 9 8 - 16 mmol/L    eGFR if African American >60 >60 mL/min/1.73 m^2    eGFR if non African American 52 (A) >60 mL/min/1.73 m^2   Lactic acid, plasma #1   Result Value Ref Range    Lactate (Lactic Acid) 1.5 0.5 - 2.2 mmol/L   Urinalysis, Reflex to Urine Culture Urine, Clean Catch    Specimen: Urine   Result Value Ref Range    Specimen UA Urine, Clean Catch     Color, UA Yellow Yellow, Straw, [place]    Appearance, UA Clear Clear    pH, UA 6.0 5.0 - 8.0    Specific Gravity, UA 1.020 1.005 - 1.030    Protein, UA Negative Negative    Glucose, UA Negative Negative    Ketones, UA Negative Negative    Bilirubin (UA) Negative Negative    Occult Blood UA 2+ (A) Negative    Nitrite, UA Negative Negative    Urobilinogen, UA Negative <2.0 EU/dL    Leukocytes, UA Trace (A) Negative   Magnesium   Result Value Ref Range    Magnesium 2.0 1.6 - 2.6 mg/dL   APTT   Result Value Ref Range    aPTT 28.8 21.0 - 32.0 sec   Protime-INR   Result Value Ref Range    Prothrombin Time 10.6 9.0 - 12.5 sec    INR 1.0 0.8 - 1.2   Brain natriuretic peptide   Result Value Ref Range    BNP 18 0 - 99 pg/mL   Lipase   Result Value Ref Range    Lipase 31 4 - 60 U/L   Troponin I   Result Value Ref Range    Troponin I <0.006 0.000 - 0.026 ng/mL   Procalcitonin   Result Value Ref Range    Procalcitonin 0.12 <0.25 ng/mL   COVID-19 Rapid Screening   Result Value Ref Range    SARS-CoV-2 RNA, Amplification, Qual Negative  Negative   Urinalysis Microscopic   Result Value Ref Range    RBC, UA 3 0 - 4 /hpf    WBC, UA 3 0 - 5 /hpf    Bacteria Occasional None-Occ /hpf    Squam Epithel, UA 3 /hpf    Microscopic Comment SEE COMMENT          Imaging Results:  Imaging Results          CT Abdomen Pelvis With Contrast (Final result)  Result time 09/10/20 21:31:09    Final result by Raphael Morris MD (09/10/20 21:31:09)                 Impression:      Nonspecific minimal submucosal hyperenhancement of the distal ileum.  This may be related to nondistention or a mild enteritis.  Clinical correlation is advised.    Known osseous metastatic disease appears similar to the prior CT chest abdomen pelvis of 08/11/2020 in the included portion.  No definite new lesions or pathologic fractures.    Probable hepatic steatosis.    All CT scans at this facility are performed  using dose modulation techniques as appropriate to performed exam including the following:  automated exposure control; adjustment of mA and/or kV according to the patients size (this includes techniques or standardized protocols for targeted exams where dose is matched to indication/reason for exam: i.e. extremities or head);  iterative reconstruction technique.      Electronically signed by: Raphael Morris  Date:    09/10/2020  Time:    21:31             Narrative:    EXAMINATION:  CT ABDOMEN PELVIS WITH CONTRAST    CLINICAL HISTORY:  diarrhea, R mid and RLQ pain; fevers; stage 4 CA;    TECHNIQUE:  Low dose axial images, sagittal and coronal reformations were obtained from the lung bases to the pubic symphysis.  Images were right after the administration of 100 mL Omnipaque 350 IV contrast.    COMPARISON:  CT chest abdomen pelvis 08/11/2020    FINDINGS:  Heart: Normal in size. No pericardial effusion.    Lung Bases: Well aerated, without consolidation or pleural fluid.    Liver: Probable hepatic steatosis.  No focal lesions.    Gallbladder: Surgically absent.    Bile Ducts: No  evidence of dilated ducts.    Pancreas: No mass or peripancreatic fat stranding.    Spleen: Prior granulomatous disease.    Adrenals: Unremarkable.    Kidneys/ Ureters: Subcentimeter renal hypodensities favored to represent cysts but not fully characterized.  No obstructive uropathy.    Bladder: No evidence of wall thickening.    Reproductive organs: Unremarkable.    GI Tract/Mesentery: Stomach appears unremarkable.  Small bowel is largely nondistended.  There is a short segment of slight hyperenhancement of the distal ileum.  This nonspecific given nondistention.  Colon appears essentially unremarkable.    Peritoneal Space: No ascites. No free air.    Retroperitoneum: No significant adenopathy.    Abdominal wall: Unremarkable.    Vasculature: No significant atherosclerosis or aneurysm.    Bones: Lytic and sclerotic lesions in the iliacs appear unchanged.  Lesions in the ribs and partial included portion of the thoracic spine appears similar.  No definite new lesions are identified.  No pathologic fracture.                               X-Ray Chest AP Portable (Final result)  Result time 09/10/20 20:39:36    Final result by Raphael Morris MD (09/10/20 20:39:36)                 Impression:      No acute abnormality.      Electronically signed by: Raphael Morris  Date:    09/10/2020  Time:    20:39             Narrative:    EXAMINATION:  XR CHEST AP PORTABLE    CLINICAL HISTORY:  Sepsis;    TECHNIQUE:  Single frontal view of the chest was performed.    COMPARISON:  09/11/2019    FINDINGS:  Stable right mid lung zone pulmonary nodule in comparison to the prior exam.  This may reflect prior granulomatous disease.  Calcifications of the bilateral yoly likely reflecting prior granulomatous disease.The lungs are clear, with normal appearance of pulmonary vasculature and no pleural effusion or pneumothorax.    The cardiac silhouette is normal in size.                                 The EKG was ordered, reviewed, and  independently interpreted by the ED provider.  Interpretation time: 20:34  Rate: 89 BPM  Rhythm: normal sinus rhythm  Interpretation: Cannot rule out anterior infarct. No STEMI.             The Emergency Provider reviewed the vital signs and test results, which are outlined above.     ED Discussion       10:12 PM: Re-evaluated pt. Pt is resting comfortably and is in no acute distress.  Pt states she feels better.  D/w pt all pertinent results. D/w pt any concerns expressed at this time. Answered all questions. Pt expresses understanding at this time.    10:17 PM: Reassessed pt at this time.  Pt states her condition has improved at this time. Discussed with pt all pertinent ED information and results. Discussed pt dx and plan of tx. Gave pt all f/u and return to the ED instructions. All questions and concerns were addressed at this time. Pt expresses understanding of information and instructions, and is comfortable with plan to discharge. Pt is stable for discharge.    I discussed with patient and/or family/caretaker that evaluation in the ED does not suggest any emergent or life threatening medical conditions requiring immediate intervention beyond what was provided in the ED, and I believe patient is safe for discharge.  Regardless, an unremarkable evaluation in the ED does not preclude the development or presence of a serious of life threatening condition. As such, patient was instructed to return immediately for any worsening or change in current symptoms.           Medical Decision Making:   Clinical Tests:   Lab Tests: Ordered and Reviewed  Radiological Study: Ordered and Reviewed  Medical Tests: Ordered and Reviewed           ED Medication(s):  Medications   acetaminophen tablet 650 mg (650 mg Oral Given 9/10/20 2145)   iohexoL (OMNIPAQUE 350) injection 100 mL (100 mLs Intravenous Given 9/10/20 2117)   sodium chloride 0.9% bolus 1,000 mL (0 mLs Intravenous Stopped 9/10/20 4257)       Discharge Medication List  "as of 9/10/2020 10:17 PM      START taking these medications    Details   dicyclomine (BENTYL) 20 mg tablet Take 1 tablet (20 mg total) by mouth 2 (two) times daily. for 10 days, Starting Thu 9/10/2020, Until Sun 9/20/2020, Print                     Scribe Attestation:   Scribe #1: I performed the above scribed service and the documentation accurately describes the services I performed. I attest to the accuracy of the note.     Attending:   Physician Attestation Statement for Scribe #1: I, Marie Trejo MD, personally performed the services described in this documentation, as scribed by Hugn Herrera, in my presence, and it is both accurate and complete.           Clinical Impression       ICD-10-CM ICD-9-CM   1. Enteritis  K52.9 558.9   2. Tachycardia  R00.0 785.0       Disposition:   Disposition: Discharged  Condition: Stable    Portions of this note may have been created with voice recognition software. Occasional "wrong-word" or "sound-a-like" substitutions may have occurred due to the inherent limitations of voice recognition software. Please, read the note carefully and recognize, using context, where substitutions have occurred.          Marie Trejo MD  09/11/20 0024    "

## 2020-09-11 NOTE — ED NOTES
Pt presents to ED via private vehicle from home. C/O fever, chills, body aches, HA that began around 3am today. Reports taking one dose of tylenol, only fever resolved. Reports brief episode of SOB and chest pain earlier today, denies chest pain SOB at this time. Hx of breast CA, currently taking chemo PO daily. No acute distress noted. AAOx4. Ambulatory.

## 2020-09-12 ENCOUNTER — HOSPITAL ENCOUNTER (EMERGENCY)
Facility: HOSPITAL | Age: 50
Discharge: HOME | End: 2020-09-12
Attending: EMERGENCY MEDICINE
Payer: MEDICAID

## 2020-09-12 VITALS
HEIGHT: 67 IN | BODY MASS INDEX: 34.5 KG/M2 | WEIGHT: 219.81 LBS | HEART RATE: 96 BPM | DIASTOLIC BLOOD PRESSURE: 71 MMHG | RESPIRATION RATE: 16 BRPM | OXYGEN SATURATION: 100 % | TEMPERATURE: 98 F | SYSTOLIC BLOOD PRESSURE: 127 MMHG

## 2020-09-12 DIAGNOSIS — R79.89 BLOOD CULTURE POSITIVE FOR MICROORGANISM: Primary | ICD-10-CM

## 2020-09-12 DIAGNOSIS — R10.9 ABDOMINAL PAIN, UNSPECIFIED ABDOMINAL LOCATION: ICD-10-CM

## 2020-09-12 LAB
ALBUMIN SERPL BCP-MCNC: 3.9 G/DL (ref 3.5–5.2)
ALP SERPL-CCNC: 61 U/L (ref 55–135)
ALT SERPL W/O P-5'-P-CCNC: 40 U/L (ref 10–44)
ANION GAP SERPL CALC-SCNC: 11 MMOL/L (ref 8–16)
AST SERPL-CCNC: 31 U/L (ref 10–40)
BASOPHILS # BLD AUTO: 0.03 K/UL (ref 0–0.2)
BASOPHILS NFR BLD: 0.9 % (ref 0–1.9)
BILIRUB SERPL-MCNC: 0.6 MG/DL (ref 0.1–1)
BUN SERPL-MCNC: 8 MG/DL (ref 6–20)
CALCIUM SERPL-MCNC: 9.2 MG/DL (ref 8.7–10.5)
CHLORIDE SERPL-SCNC: 103 MMOL/L (ref 95–110)
CO2 SERPL-SCNC: 27 MMOL/L (ref 23–29)
CREAT SERPL-MCNC: 1.1 MG/DL (ref 0.5–1.4)
DIFFERENTIAL METHOD: ABNORMAL
EOSINOPHIL # BLD AUTO: 0.1 K/UL (ref 0–0.5)
EOSINOPHIL NFR BLD: 2.9 % (ref 0–8)
ERYTHROCYTE [DISTWIDTH] IN BLOOD BY AUTOMATED COUNT: 13.1 % (ref 11.5–14.5)
EST. GFR  (AFRICAN AMERICAN): >60 ML/MIN/1.73 M^2
EST. GFR  (NON AFRICAN AMERICAN): 58 ML/MIN/1.73 M^2
GLUCOSE SERPL-MCNC: 111 MG/DL (ref 70–110)
HCT VFR BLD AUTO: 37.5 % (ref 37–48.5)
HGB BLD-MCNC: 12.7 G/DL (ref 12–16)
HIV 1+2 AB+HIV1 P24 AG SERPL QL IA: NEGATIVE
IMM GRANULOCYTES # BLD AUTO: 0.02 K/UL (ref 0–0.04)
IMM GRANULOCYTES NFR BLD AUTO: 0.6 % (ref 0–0.5)
LYMPHOCYTES # BLD AUTO: 1 K/UL (ref 1–4.8)
LYMPHOCYTES NFR BLD: 28.9 % (ref 18–48)
MCH RBC QN AUTO: 37.6 PG (ref 27–31)
MCHC RBC AUTO-ENTMCNC: 33.9 G/DL (ref 32–36)
MCV RBC AUTO: 111 FL (ref 82–98)
MONOCYTES # BLD AUTO: 0.4 K/UL (ref 0.3–1)
MONOCYTES NFR BLD: 10.2 % (ref 4–15)
NEUTROPHILS # BLD AUTO: 1.9 K/UL (ref 1.8–7.7)
NEUTROPHILS NFR BLD: 56.5 % (ref 38–73)
NRBC BLD-RTO: 0 /100 WBC
PLATELET # BLD AUTO: 200 K/UL (ref 150–350)
PMV BLD AUTO: 10.8 FL (ref 9.2–12.9)
POTASSIUM SERPL-SCNC: 3.8 MMOL/L (ref 3.5–5.1)
PROT SERPL-MCNC: 7.6 G/DL (ref 6–8.4)
RBC # BLD AUTO: 3.38 M/UL (ref 4–5.4)
SODIUM SERPL-SCNC: 141 MMOL/L (ref 136–145)
WBC # BLD AUTO: 3.42 K/UL (ref 3.9–12.7)

## 2020-09-12 PROCEDURE — 80053 COMPREHEN METABOLIC PANEL: CPT

## 2020-09-12 PROCEDURE — 86703 HIV-1/HIV-2 1 RESULT ANTBDY: CPT

## 2020-09-12 PROCEDURE — 87040 BLOOD CULTURE FOR BACTERIA: CPT

## 2020-09-12 PROCEDURE — 85025 COMPLETE CBC W/AUTO DIFF WBC: CPT

## 2020-09-12 PROCEDURE — 99283 EMERGENCY DEPT VISIT LOW MDM: CPT

## 2020-09-12 NOTE — ED PROVIDER NOTES
SCRIBE #1 NOTE: I, Colten Bowser, am scribing for, and in the presence of, Pantera Nino Jr., MD. I have scribed the entire note.       History     Chief Complaint   Patient presents with    abnormal labs     positive blood cultures. asked to come back to ER.     Review of patient's allergies indicates:   Allergen Reactions    Nsaids (non-steroidal anti-inflammatory drug) Other (See Comments)     Instructed not to take NSAID drugs with chemo pill.    Vancomycin analogues Itching         History of Present Illness     HPI    9/12/2020, 2:57 PM   History obtained from the patient      History of Present Illness: Josey Flores is a 49 y.o. female patient with a PMHx of hypothyroidism, COPD, R breast CA, and asthma who presents to the Emergency Department for evaluation of positive blood cultures which onset gradually today. Pt was called to report to the ED. Symptoms are constant and moderate in severity. No mitigating or exacerbating factors reported. No associated sxs reported. Patient denies any fever, SOB, CP, abd pain, N/V, back pain, HA, weakness, and all other sxs at this time. No prior Tx reported. No further complaints or concerns at this time.        Arrival mode: Personal vehicle     PCP: Primary Doctor No        Past Medical History:  Past Medical History:   Diagnosis Date    Anxiety     Asthma     Cancer     right breast, metastatic-lymph nodes, bone, and thyroid     COPD (chronic obstructive pulmonary disease)     Depression     History of psychiatric hospitalization     2000; suicidal ideation    Hx of psychiatric care     Hypothyroidism     Miscarriage     five miscarriages    Obesity     Psychiatric problem     Thyroid cancer 2017       Past Surgical History:  Past Surgical History:   Procedure Laterality Date    ABSCESS DRAINAGE      bilateral axilla    ADENOIDECTOMY      APPENDECTOMY      CHOLECYSTECTOMY      MASS EXCISION      SKIN GRAFT  06/16/2017    THYROIDECTOMY  Bilateral     TONSILLECTOMY           Family History:  Family History   Problem Relation Age of Onset    Arthritis Mother     Early death Mother         64 at time of death    Heart attack Mother     Heart disease Mother     Heart failure Mother     Hypertension Mother     Coronary artery disease Father     Hearing loss Father     Heart disease Father     Hyperlipidemia Father     Hypertension Father     Mental illness Father     Learning disabilities Son     Cancer Maternal Aunt        Social History:  Social History     Tobacco Use    Smoking status: Current Every Day Smoker     Packs/day: 1.00     Years: 32.00     Pack years: 32.00     Types: Cigarettes     Start date: 1/1/1985     Last attempt to quit: 6/11/2017     Years since quitting: 3.2    Smokeless tobacco: Never Used    Tobacco comment: 45 pack year history   Substance and Sexual Activity    Alcohol use: No    Drug use: No    Sexual activity: Yes     Partners: Male        Review of Systems     Review of Systems   Constitutional: Negative for fever.        (+) positive cultures   HENT: Negative for sore throat.    Respiratory: Negative for shortness of breath.    Cardiovascular: Negative for chest pain.   Gastrointestinal: Negative for abdominal pain, nausea and vomiting.   Genitourinary: Negative for dysuria.   Musculoskeletal: Negative for back pain.   Skin: Negative for rash.   Neurological: Negative for weakness and headaches.   Hematological: Does not bruise/bleed easily.   All other systems reviewed and are negative.       Physical Exam     Initial Vitals [09/12/20 1414]   BP Pulse Resp Temp SpO2   127/71 96 16 98 °F (36.7 °C) 100 %      MAP       --          Physical Exam  Nursing Notes and Vital Signs Reviewed.  Constitutional: Patient is in no acute distress. Well-developed and well-nourished.  Head: Atraumatic. Normocephalic.  Eyes: PERRL. EOM intact. Conjunctivae are not pale. No scleral icterus.  ENT: Mucous membranes are  "moist. Oropharynx is clear and symmetric.    Neck: Supple. Full ROM. No lymphadenopathy.  Cardiovascular: Regular rate. Regular rhythm. No murmurs, rubs, or gallops. Distal pulses are 2+ and symmetric.  Pulmonary/Chest: No respiratory distress. Clear to auscultation bilaterally. No wheezing or rales.  Abdominal: Soft and non-distended.  There is no tenderness.  No rebound, guarding, or rigidity. Good bowel sounds.  Genitourinary: No CVA tenderness  Musculoskeletal: Moves all extremities. No obvious deformities. No edema. No calf tenderness.  Skin: Warm and dry.  Neurological:  Alert, awake, and appropriate.  Normal speech.  No acute focal neurological deficits are appreciated.  Psychiatric: Normal affect. Good eye contact. Appropriate in content.     ED Course   Procedures  ED Vital Signs:  Vitals:    09/12/20 1414   BP: 127/71   Pulse: 96   Resp: 16   Temp: 98 °F (36.7 °C)   TempSrc: Oral   SpO2: 100%   Weight: 99.7 kg (219 lb 12.8 oz)   Height: 5' 7" (1.702 m)       Abnormal Lab Results:  Labs Reviewed   CBC W/ AUTO DIFFERENTIAL - Abnormal; Notable for the following components:       Result Value    WBC 3.42 (*)     RBC 3.38 (*)     Mean Corpuscular Volume 111 (*)     Mean Corpuscular Hemoglobin 37.6 (*)     Immature Granulocytes 0.6 (*)     All other components within normal limits   COMPREHENSIVE METABOLIC PANEL - Abnormal; Notable for the following components:    Glucose 111 (*)     eGFR if non  58 (*)     All other components within normal limits   CULTURE, BLOOD   CULTURE, BLOOD   HIV 1 / 2 ANTIBODY        All Lab Results:  Results for orders placed or performed during the hospital encounter of 09/12/20   CBC auto differential   Result Value Ref Range    WBC 3.42 (L) 3.90 - 12.70 K/uL    RBC 3.38 (L) 4.00 - 5.40 M/uL    Hemoglobin 12.7 12.0 - 16.0 g/dL    Hematocrit 37.5 37.0 - 48.5 %    Mean Corpuscular Volume 111 (H) 82 - 98 fL    Mean Corpuscular Hemoglobin 37.6 (H) 27.0 - 31.0 pg    Mean " Corpuscular Hemoglobin Conc 33.9 32.0 - 36.0 g/dL    RDW 13.1 11.5 - 14.5 %    Platelets 200 150 - 350 K/uL    MPV 10.8 9.2 - 12.9 fL    Immature Granulocytes 0.6 (H) 0.0 - 0.5 %    Gran # (ANC) 1.9 1.8 - 7.7 K/uL    Immature Grans (Abs) 0.02 0.00 - 0.04 K/uL    Lymph # 1.0 1.0 - 4.8 K/uL    Mono # 0.4 0.3 - 1.0 K/uL    Eos # 0.1 0.0 - 0.5 K/uL    Baso # 0.03 0.00 - 0.20 K/uL    nRBC 0 0 /100 WBC    Gran% 56.5 38.0 - 73.0 %    Lymph% 28.9 18.0 - 48.0 %    Mono% 10.2 4.0 - 15.0 %    Eosinophil% 2.9 0.0 - 8.0 %    Basophil% 0.9 0.0 - 1.9 %    Differential Method Automated    Comprehensive metabolic panel   Result Value Ref Range    Sodium 141 136 - 145 mmol/L    Potassium 3.8 3.5 - 5.1 mmol/L    Chloride 103 95 - 110 mmol/L    CO2 27 23 - 29 mmol/L    Glucose 111 (H) 70 - 110 mg/dL    BUN, Bld 8 6 - 20 mg/dL    Creatinine 1.1 0.5 - 1.4 mg/dL    Calcium 9.2 8.7 - 10.5 mg/dL    Total Protein 7.6 6.0 - 8.4 g/dL    Albumin 3.9 3.5 - 5.2 g/dL    Total Bilirubin 0.6 0.1 - 1.0 mg/dL    Alkaline Phosphatase 61 55 - 135 U/L    AST 31 10 - 40 U/L    ALT 40 10 - 44 U/L    Anion Gap 11 8 - 16 mmol/L    eGFR if African American >60 >60 mL/min/1.73 m^2    eGFR if non African American 58 (A) >60 mL/min/1.73 m^2         Imaging Results:  None           The Emergency Provider reviewed the vital signs and test results, which are outlined above.     ED Discussion     4:14 PM: Reassessed pt at this time.  Pt states her condition has improved at this time. Discussed with pt all pertinent ED information and results. Discussed pt dx and plan of tx. Gave pt all f/u and return to the ED instructions. All questions and concerns were addressed at this time. Pt expresses understanding of information and instructions, and is comfortable with plan to discharge. Pt is stable for discharge.    I discussed with patient and/or family/caretaker that evaluation in the ED does not suggest any emergent or life threatening medical conditions requiring  immediate intervention beyond what was provided in the ED, and I believe patient is safe for discharge.  Regardless, an unremarkable evaluation in the ED does not preclude the development or presence of a serious of life threatening condition. As such, patient was instructed to return immediately for any worsening or change in current symptoms.     ED Medication(s):  Medications - No data to display    New Prescriptions    No medications on file       Follow-up Information     PCP. Call in 2 days.                       Medical Decision Making:   Initial Assessment:   49-year-old female was called back by our nursing staff for a possible positive blood culture.  She was here couple days ago with what appeared to be gastroenteritis and abdominal pain.  That has significantly improved per the patient.  She is afebrile today with improvement of her symptoms I did include indicate to her was a possibility of contamination therefore we will obtain more blood cultures repeat some basic labs to ensure she was improving.  Her white count did come back normal.  She does have my chart in will be able to follow up with these blood cultures.  She was instructed if they came back positive we would be calling her again.  Clinical Tests:   Lab Tests: Ordered and Reviewed             Scribe Attestation:   Scribe #1: I performed the above scribed service and the documentation accurately describes the services I performed. I attest to the accuracy of the note.     Attending:   Physician Attestation Statement for Scribe #1: I, Pantera Nino Jr., MD, personally performed the services described in this documentation, as scribed by Colten Bowser, in my presence, and it is both accurate and complete.           Clinical Impression       ICD-10-CM ICD-9-CM   1. Blood culture positive for microorganism  R79.89 790.99   2. Abdominal pain, unspecified abdominal location  R10.9 789.00       Disposition:   Disposition: Discharged  Condition:  Stable       Pantera Nino Jr., MD  09/12/20 1089

## 2020-09-14 LAB
BACTERIA BLD CULT: ABNORMAL

## 2020-09-15 DIAGNOSIS — R06.02 SOB (SHORTNESS OF BREATH): Primary | ICD-10-CM

## 2020-09-16 LAB — BACTERIA BLD CULT: NORMAL

## 2020-09-17 ENCOUNTER — HOSPITAL ENCOUNTER (OUTPATIENT)
Dept: RADIOLOGY | Facility: HOSPITAL | Age: 50
Discharge: HOME | End: 2020-09-17
Attending: NURSE PRACTITIONER
Payer: MEDICAID

## 2020-09-17 DIAGNOSIS — R06.02 SOB (SHORTNESS OF BREATH): ICD-10-CM

## 2020-09-17 PROCEDURE — 71046 X-RAY EXAM CHEST 2 VIEWS: CPT | Mod: TC

## 2020-09-17 PROCEDURE — 71046 XR CHEST PA AND LATERAL: ICD-10-PCS | Mod: 26,,, | Performed by: RADIOLOGY

## 2020-09-17 PROCEDURE — 71046 X-RAY EXAM CHEST 2 VIEWS: CPT | Mod: 26,,, | Performed by: RADIOLOGY

## 2020-09-18 LAB
BACTERIA BLD CULT: NORMAL
BACTERIA BLD CULT: NORMAL

## 2020-09-21 ENCOUNTER — TELEPHONE (OUTPATIENT)
Dept: HEMATOLOGY/ONCOLOGY | Facility: CLINIC | Age: 50
End: 2020-09-21

## 2020-09-21 NOTE — TELEPHONE ENCOUNTER
SW left VM to discuss pt's distress from August and September.     F/u by appt, Nov 4, if no calls or responses to previous pt mortal messages have been returned.

## 2020-10-02 DIAGNOSIS — G89.3 CANCER ASSOCIATED PAIN: ICD-10-CM

## 2020-10-02 DIAGNOSIS — F41.9 ANXIETY: ICD-10-CM

## 2020-10-02 RX ORDER — MORPHINE SULFATE 15 MG/1
15 TABLET, FILM COATED, EXTENDED RELEASE ORAL EVERY 12 HOURS
Qty: 60 TABLET | Refills: 0 | Status: SHIPPED | OUTPATIENT
Start: 2020-10-02 | End: 2020-11-01 | Stop reason: SDUPTHER

## 2020-10-02 RX ORDER — ALPRAZOLAM 0.5 MG/1
0.5 TABLET ORAL 2 TIMES DAILY PRN
Qty: 60 TABLET | Refills: 1 | Status: SHIPPED | OUTPATIENT
Start: 2020-10-02 | End: 2020-10-27

## 2020-10-02 RX ORDER — ALPRAZOLAM 0.5 MG/1
0.5 TABLET ORAL 2 TIMES DAILY PRN
Qty: 60 TABLET | Refills: 1 | Status: CANCELLED | OUTPATIENT
Start: 2020-10-02 | End: 2021-10-02

## 2020-10-02 RX ORDER — MORPHINE SULFATE 15 MG/1
15 TABLET, FILM COATED, EXTENDED RELEASE ORAL EVERY 12 HOURS
Qty: 60 TABLET | Refills: 0 | Status: CANCELLED | OUTPATIENT
Start: 2020-10-02

## 2020-10-02 NOTE — TELEPHONE ENCOUNTER
Palliative Care:    Notified patient of refills sent by Dr. Rubio to [address]  She verbalized understanding.    Leeanna Banks RN

## 2020-10-08 ENCOUNTER — TELEPHONE (OUTPATIENT)
Dept: PHARMACY | Facility: CLINIC | Age: 50
End: 2020-10-08

## 2020-10-12 DIAGNOSIS — G89.3 CANCER ASSOCIATED PAIN: ICD-10-CM

## 2020-10-12 RX ORDER — HYDROCODONE BITARTRATE AND ACETAMINOPHEN 10; 325 MG/1; MG/1
1 TABLET ORAL EVERY 4 HOURS PRN
Qty: 120 TABLET | Refills: 0 | Status: CANCELLED | OUTPATIENT
Start: 2020-10-12

## 2020-10-13 RX ORDER — HYDROCODONE BITARTRATE AND ACETAMINOPHEN 10; 325 MG/1; MG/1
1 TABLET ORAL EVERY 4 HOURS PRN
Qty: 120 TABLET | Refills: 0 | Status: SHIPPED | OUTPATIENT
Start: 2020-10-13 | End: 2020-11-04 | Stop reason: SDUPTHER

## 2020-10-27 ENCOUNTER — OFFICE VISIT (OUTPATIENT)
Dept: PALLIATIVE MEDICINE | Facility: CLINIC | Age: 50
End: 2020-10-27
Payer: MEDICAID

## 2020-10-27 VITALS
TEMPERATURE: 99 F | DIASTOLIC BLOOD PRESSURE: 82 MMHG | RESPIRATION RATE: 18 BRPM | HEART RATE: 77 BPM | SYSTOLIC BLOOD PRESSURE: 114 MMHG

## 2020-10-27 DIAGNOSIS — F41.9 ANXIETY: ICD-10-CM

## 2020-10-27 DIAGNOSIS — Z71.89 ACP (ADVANCE CARE PLANNING): ICD-10-CM

## 2020-10-27 DIAGNOSIS — F32.9 REACTIVE DEPRESSION: Primary | ICD-10-CM

## 2020-10-27 PROCEDURE — 99497 ADVNCD CARE PLAN 30 MIN: CPT | Mod: S$PBB,,, | Performed by: FAMILY MEDICINE

## 2020-10-27 PROCEDURE — 99999 PR PBB SHADOW E&M-EST. PATIENT-LVL III: CPT | Mod: PBBFAC,,, | Performed by: FAMILY MEDICINE

## 2020-10-27 PROCEDURE — 99497 PR ADVNCD CARE PLAN 30 MIN: ICD-10-PCS | Mod: S$PBB,,, | Performed by: FAMILY MEDICINE

## 2020-10-27 PROCEDURE — 99214 PR OFFICE/OUTPT VISIT, EST, LEVL IV, 30-39 MIN: ICD-10-PCS | Mod: S$PBB,,, | Performed by: FAMILY MEDICINE

## 2020-10-27 PROCEDURE — 99497 ADVNCD CARE PLAN 30 MIN: CPT | Mod: PBBFAC | Performed by: FAMILY MEDICINE

## 2020-10-27 PROCEDURE — 90686 IIV4 VACC NO PRSV 0.5 ML IM: CPT | Mod: PBBFAC

## 2020-10-27 PROCEDURE — 99214 OFFICE O/P EST MOD 30 MIN: CPT | Mod: S$PBB,,, | Performed by: FAMILY MEDICINE

## 2020-10-27 PROCEDURE — 99213 OFFICE O/P EST LOW 20 MIN: CPT | Mod: PBBFAC,25 | Performed by: FAMILY MEDICINE

## 2020-10-27 PROCEDURE — 99999 PR PBB SHADOW E&M-EST. PATIENT-LVL III: ICD-10-PCS | Mod: PBBFAC,,, | Performed by: FAMILY MEDICINE

## 2020-10-27 RX ORDER — ALPRAZOLAM 1 MG/1
.5-1 TABLET ORAL 2 TIMES DAILY PRN
Qty: 60 TABLET | Refills: 0 | Status: SHIPPED | OUTPATIENT
Start: 2020-10-27 | End: 2020-12-01 | Stop reason: SDUPTHER

## 2020-10-27 RX ORDER — SERTRALINE HYDROCHLORIDE 25 MG/1
25 TABLET, FILM COATED ORAL DAILY
Qty: 90 TABLET | Refills: 3 | Status: SHIPPED | OUTPATIENT
Start: 2020-10-27 | End: 2021-12-09

## 2020-10-27 NOTE — PROGRESS NOTES
Subjective:       Patient ID: Josey Flores is a 49 y.o. female.    Chief Complaint: palliative f/u    48 yo female here for palliative f/u. She is currently in treatment for metastatic breast cancer and had recent restaging imaging done which showed stable disease. Tolerating her treatment without toxicity. She is joined by her partner today who feels she's been doing well outside of being depressed. Ms. Flores reports increased stress and depression which she attributes to having their 2 grown children along with each child's romantic partner living with them right now. She often isolates herself in her room as a result. Her partner also recently lost his job, related to the coronavirus event, which has led to increased financial stress. She has taken her xanax more often than usual lately. Feels loss of interest in activities, poor appetite, and difficulty sleeping while simultaneously feeling fatigued. No SI. She does have a history of depression and hospitalization 20 years ago but she reports her symptoms are not nearly as severe now as they were then.      Advance Care Planning    Power of   I initiated the process of advance care planning today and explained the importance of this process to the patient.  I introduced the concept of advance directives to the patient, as well. Then the patient received detailed information about the importance of designating a Health Care Power of  (HCPOA). She was also instructed to communicate with this person about their wishes for future healthcare, should she become sick and lose decision-making capacity. The patient has not previously appointed a HCPOA. After our discussion, the patient has decided to complete a HCPOA and has appointed her sister and NAME:David Curtis [phone]. We discussed the benefits of completing a living will. She would like to remain full code and is ok with mechanical ventilation but would not want prolonged life support.   I spent a total time of 20 minutes discussing this issue with the patient.             does not have any pertinent problems on file.  Past Medical History:   Diagnosis Date    Anxiety     Asthma     Cancer     right breast, metastatic-lymph nodes, bone, and thyroid     COPD (chronic obstructive pulmonary disease)     Depression     History of psychiatric hospitalization     2000; suicidal ideation    Hx of psychiatric care     Hypothyroidism     Miscarriage     five miscarriages    Obesity     Psychiatric problem     Thyroid cancer 2017     Past Surgical History:   Procedure Laterality Date    ABSCESS DRAINAGE      bilateral axilla    ADENOIDECTOMY      APPENDECTOMY      CHOLECYSTECTOMY      MASS EXCISION      SKIN GRAFT  06/16/2017    THYROIDECTOMY Bilateral     TONSILLECTOMY       Family History   Problem Relation Age of Onset    Arthritis Mother     Early death Mother         64 at time of death    Heart attack Mother     Heart disease Mother     Heart failure Mother     Hypertension Mother     Coronary artery disease Father     Hearing loss Father     Heart disease Father     Hyperlipidemia Father     Hypertension Father     Mental illness Father     Learning disabilities Son     Cancer Maternal Aunt      Social History     Socioeconomic History    Marital status:      Spouse name: Not on file    Number of children: 2    Years of education: Not on file    Highest education level: Not on file   Occupational History    Not on file   Social Needs    Financial resource strain: Not on file    Food insecurity     Worry: Not on file     Inability: Not on file    Transportation needs     Medical: Not on file     Non-medical: Not on file   Tobacco Use    Smoking status: Current Every Day Smoker     Packs/day: 1.00     Years: 32.00     Pack years: 32.00     Types: Cigarettes     Start date: 1/1/1985     Last attempt to quit: 6/11/2017     Years since quitting: 3.3     Smokeless tobacco: Never Used    Tobacco comment: 45 pack year history   Substance and Sexual Activity    Alcohol use: No    Drug use: No    Sexual activity: Yes     Partners: Male   Lifestyle    Physical activity     Days per week: Not on file     Minutes per session: Not on file    Stress: Not on file   Relationships    Social connections     Talks on phone: Not on file     Gets together: Not on file     Attends Sikhism service: Not on file     Active member of club or organization: Not on file     Attends meetings of clubs or organizations: Not on file     Relationship status: Not on file   Other Topics Concern    Patient feels they ought to cut down on drinking/drug use Not Asked    Patient annoyed by others criticizing their drinking/drug use Not Asked    Patient has felt bad or guilty about drinking/drug use Not Asked    Patient has had a drink/used drugs as an eye opener in the AM Not Asked   Social History Narrative     with two adult children; common law marriage (10+years); raised [employer], no current Sikhism practice     Review of Systems   A comprehensive 14-point review of systems was reviewed with patient and was negative other than as specified above.     Objective:     Vitals:    10/27/20 1333   BP: 114/82   Pulse: 77   Resp: 18   Temp: 99.3 °F (37.4 °C)        Physical Exam  Vitals signs and nursing note reviewed.   Constitutional:       General: She is not in acute distress.     Appearance: She is well-developed. She is obese.   HENT:      Head: Normocephalic and atraumatic.   Eyes:      General: No scleral icterus.     Pupils: Pupils are equal, round, and reactive to light.   Neck:      Musculoskeletal: Normal range of motion and neck supple.   Cardiovascular:      Rate and Rhythm: Normal rate and regular rhythm.   Pulmonary:      Effort: Pulmonary effort is normal. No respiratory distress.      Breath sounds: Normal breath sounds.   Musculoskeletal: Normal range of motion.          General: No deformity.   Skin:     General: Skin is warm and dry.      Findings: No rash.   Neurological:      General: No focal deficit present.      Mental Status: She is alert and oriented to person, place, and time.   Psychiatric:         Behavior: Behavior normal.         Thought Content: Thought content normal.      Comments: Normal affect; engaged and appropriate         Review of Symptoms    Symptom Assessment (ESAS 0-10 Scale)  Pain:  6  Dyspnea:  2  Anxiety:  8  Nausea:  3  Depression:  8  Anorexia:  5  Fatigue:  3  Insomnia:  0  Restlessness:  5  Agitation:  0     CAM / Delirium:  Negative  Constipation:  Negative  Diarrhea:  Negative    Bowel Management Plan (BMP):  No      Pain Assessment:  Location(s): abdomen      ECOG Performance Status Grade:  0 - Fully Active    Living Arrangements:  Lives alone and Lives with family    Psychosocial/Cultural: Lives with boyfriend, son, son's girlfriend, boyfriend's son and his girlfriend    Advance Care Planning   Advance Directives:   Living Will: Yes        Copy on chart: No    Do Not Resuscitate Status: No    Medical Power of : Yes      Decision Making:  Patient answered questions         Assessment:       1. Reactive depression    2. Anxiety    3. ACP (advance care planning)        Plan:           Problem List Items Addressed This Visit        Psychiatric    Anxiety    Relevant Medications    ALPRAZolam (XANAX) 1 MG tablet       Palliative Care    ACP (advance care planning)    Overview     Has named her sister her HCPOA    Wishes to avoid prolonged artificial life support; would like to remain a full code at this time but would like to continue this conversation as her health status evolves.           Other Visit Diagnoses     Reactive depression    -  Primary    Relevant Medications    sertraline (ZOLOFT) 25 MG tablet        Increased alprazolam to 1 mg tabs with option to take 0.5-1 tab as needed. Added sertraline for symptoms of depression  and anxiety which are worsening and educated on med use and common side effects. Will f/u in 4-6 weeks.     An additional 20 minutes were spent in ACP education, discussion, and document completion.

## 2020-10-27 NOTE — PATIENT INSTRUCTIONS
Sertraline tablets  What is this medicine?  SERTRALINE (SER tra mazin) is used to treat depression. It may also be used to treat obsessive compulsive disorder, panic disorder, post-trauma stress, premenstrual dysphoric disorder (PMDD) or social anxiety.  How should I use this medicine?  Take this medicine by mouth with a glass of water. Follow the directions on the prescription label. You can take it with or without food. Take your medicine at regular intervals. Do not take your medicine more often than directed. Do not stop taking this medicine suddenly except upon the advice of your doctor. Stopping this medicine too quickly may cause serious side effects or your condition may worsen.  A special MedGuide will be given to you by the pharmacist with each prescription and refill. Be sure to read this information carefully each time.  Talk to your pediatrician regarding the use of this medicine in children. While this drug may be prescribed for children as young as 7 years for selected conditions, precautions do apply.  What side effects may I notice from receiving this medicine?  Side effects that you should report to your doctor or health care professional as soon as possible:  · allergic reactions like skin rash, itching or hives, swelling of the face, lips, or tongue  · black or bloody stools, blood in the urine or vomit  · fast, irregular heartbeat  · feeling faint or lightheaded, falls  · hallucination, loss of contact with reality  · seizures  · suicidal thoughts or other mood changes  · unusual bleeding or bruising  · unusually weak or tired  · vomiting  Side effects that usually do not require medical attention (report to your doctor or health care professional if they continue or are bothersome):  · change in appetite  · change in sex drive or performance  · diarrhea  · increased sweating  · indigestion, nausea  · tremors  What may interact with this medicine?  Do not take this medicine with any of the  following medications:  · certain medicines for fungal infections like fluconazole, itraconazole, ketoconazole, posaconazole, voriconazole  · cisapride  · disulfiram  · dofetilide  · linezolid  · MAOIs like Carbex, Eldepryl, Marplan, Nardil, and Parnate  · metronidazole  · methylene blue (injected into a vein)  · pimozide  · thioridazine  · ziprasidone  This medicine may also interact with the following medications:  · alcohol  · aspirin and aspirin-like medicines  · certain medicines for depression, anxiety, or psychotic disturbances  · certain medicines for irregular heart beat like flecainide, propafenone  · certain medicines for migraine headaches like almotriptan, eletriptan, frovatriptan, naratriptan, rizatriptan, sumatriptan, zolmitriptan  · certain medicines for sleep  · certain medicines for seizures like carbamazepine, valproic acid, phenytoin  · certain medicines that treat or prevent blood clots like warfarin, enoxaparin, dalteparin  · cimetidine  · digoxin  · diuretics  · fentanyl  · furazolidone  · isoniazid  · lithium  · NSAIDs, medicines for pain and inflammation, like ibuprofen or naproxen  · other medicines that prolong the QT interval (cause an abnormal heart rhythm)  · procarbazine  · rasagiline  · supplements like [place]'s wort, kava kava, valerian  · tolbutamide  · tramadol  · tryptophan  What if I miss a dose?  If you miss a dose, take it as soon as you can. If it is almost time for your next dose, take only that dose. Do not take double or extra doses.  Where should I keep my medicine?  Keep out of the reach of children.  Store at room temperature between 15 and 30 degrees C (59 and 86 degrees F). Throw away any unused medicine after the expiration date.  What should I tell my health care provider before I take this medicine?  They need to know if you have any of these conditions:  · bipolar disorder or a family history of bipolar disorder  · diabetes  · glaucoma  · heart disease  · high  blood pressure  · history of irregular heartbeat  · history of low levels of calcium, magnesium, or potassium in the blood  · if you often drink alcohol  · liver disease  · receiving electroconvulsive therapy  · seizures  · suicidal thoughts, plans, or attempt; a previous suicide attempt by you or a family member  · thyroid disease  · an unusual or allergic reaction to sertraline, other medicines, foods, dyes, or preservatives  · pregnant or trying to get pregnant  · breast-feeding  What should I watch for while using this medicine?  Tell your doctor if your symptoms do not get better or if they get worse. Visit your doctor or health care professional for regular checks on your progress. Because it may take several weeks to see the full effects of this medicine, it is important to continue your treatment as prescribed by your doctor.  Patients and their families should watch out for new or worsening thoughts of suicide or depression. Also watch out for sudden changes in feelings such as feeling anxious, agitated, panicky, irritable, hostile, aggressive, impulsive, severely restless, overly excited and hyperactive, or not being able to sleep. If this happens, especially at the beginning of treatment or after a change in dose, call your health care professional.  You may get drowsy or dizzy. Do not drive, use machinery, or do anything that needs mental alertness until you know how this medicine affects you. Do not stand or sit up quickly, especially if you are an older patient. This reduces the risk of dizzy or fainting spells. Alcohol may interfere with the effect of this medicine. Avoid alcoholic drinks.  Your mouth may get dry. Chewing sugarless gum or sucking hard candy, and drinking plenty of water may help. Contact your doctor if the problem does not go away or is severe.  NOTE:This sheet is a summary. It may not cover all possible information. If you have questions about this medicine, talk to your doctor,  pharmacist, or health care provider. Copyright© 2017 [employer]

## 2020-11-04 DIAGNOSIS — G89.3 CANCER ASSOCIATED PAIN: ICD-10-CM

## 2020-11-06 ENCOUNTER — HOSPITAL ENCOUNTER (EMERGENCY)
Facility: HOSPITAL | Age: 50
Discharge: HOME | End: 2020-11-06
Attending: EMERGENCY MEDICINE
Payer: MEDICAID

## 2020-11-06 VITALS
OXYGEN SATURATION: 96 % | SYSTOLIC BLOOD PRESSURE: 112 MMHG | HEIGHT: 67 IN | BODY MASS INDEX: 33.82 KG/M2 | TEMPERATURE: 98 F | WEIGHT: 215.5 LBS | RESPIRATION RATE: 18 BRPM | HEART RATE: 79 BPM | DIASTOLIC BLOOD PRESSURE: 73 MMHG

## 2020-11-06 DIAGNOSIS — R07.9 CHEST PAIN: Primary | ICD-10-CM

## 2020-11-06 LAB
ALBUMIN SERPL BCP-MCNC: 3.9 G/DL (ref 3.5–5.2)
ALP SERPL-CCNC: 70 U/L (ref 55–135)
ALT SERPL W/O P-5'-P-CCNC: 26 U/L (ref 10–44)
ANION GAP SERPL CALC-SCNC: 11 MMOL/L (ref 8–16)
AST SERPL-CCNC: 20 U/L (ref 10–40)
BASOPHILS # BLD AUTO: 0.06 K/UL (ref 0–0.2)
BASOPHILS NFR BLD: 1.1 % (ref 0–1.9)
BILIRUB SERPL-MCNC: 0.5 MG/DL (ref 0.1–1)
BNP SERPL-MCNC: <10 PG/ML (ref 0–99)
BUN SERPL-MCNC: 6 MG/DL (ref 6–20)
CALCIUM SERPL-MCNC: 9.3 MG/DL (ref 8.7–10.5)
CHLORIDE SERPL-SCNC: 105 MMOL/L (ref 95–110)
CO2 SERPL-SCNC: 24 MMOL/L (ref 23–29)
CREAT SERPL-MCNC: 1.2 MG/DL (ref 0.5–1.4)
DIFFERENTIAL METHOD: ABNORMAL
EOSINOPHIL # BLD AUTO: 0.1 K/UL (ref 0–0.5)
EOSINOPHIL NFR BLD: 1.9 % (ref 0–8)
ERYTHROCYTE [DISTWIDTH] IN BLOOD BY AUTOMATED COUNT: 12.9 % (ref 11.5–14.5)
EST. GFR  (AFRICAN AMERICAN): >60 ML/MIN/1.73 M^2
EST. GFR  (NON AFRICAN AMERICAN): 52 ML/MIN/1.73 M^2
GLUCOSE SERPL-MCNC: 99 MG/DL (ref 70–110)
HCT VFR BLD AUTO: 40.5 % (ref 37–48.5)
HGB BLD-MCNC: 13.5 G/DL (ref 12–16)
IMM GRANULOCYTES # BLD AUTO: 0.04 K/UL (ref 0–0.04)
IMM GRANULOCYTES NFR BLD AUTO: 0.7 % (ref 0–0.5)
LYMPHOCYTES # BLD AUTO: 1.3 K/UL (ref 1–4.8)
LYMPHOCYTES NFR BLD: 23 % (ref 18–48)
MCH RBC QN AUTO: 35.4 PG (ref 27–31)
MCHC RBC AUTO-ENTMCNC: 33.3 G/DL (ref 32–36)
MCV RBC AUTO: 106 FL (ref 82–98)
MONOCYTES # BLD AUTO: 0.4 K/UL (ref 0.3–1)
MONOCYTES NFR BLD: 7.3 % (ref 4–15)
NEUTROPHILS # BLD AUTO: 3.7 K/UL (ref 1.8–7.7)
NEUTROPHILS NFR BLD: 66 % (ref 38–73)
NRBC BLD-RTO: 0 /100 WBC
PLATELET # BLD AUTO: 231 K/UL (ref 150–350)
PMV BLD AUTO: 11 FL (ref 9.2–12.9)
POTASSIUM SERPL-SCNC: 4.2 MMOL/L (ref 3.5–5.1)
PROT SERPL-MCNC: 7.3 G/DL (ref 6–8.4)
RBC # BLD AUTO: 3.81 M/UL (ref 4–5.4)
SODIUM SERPL-SCNC: 140 MMOL/L (ref 136–145)
TROPONIN I SERPL DL<=0.01 NG/ML-MCNC: <0.006 NG/ML (ref 0–0.03)
WBC # BLD AUTO: 5.65 K/UL (ref 3.9–12.7)

## 2020-11-06 PROCEDURE — 93010 EKG 12-LEAD: ICD-10-PCS | Mod: ,,, | Performed by: INTERNAL MEDICINE

## 2020-11-06 PROCEDURE — 99285 EMERGENCY DEPT VISIT HI MDM: CPT | Mod: 25

## 2020-11-06 PROCEDURE — 36000 PLACE NEEDLE IN VEIN: CPT

## 2020-11-06 PROCEDURE — 84484 ASSAY OF TROPONIN QUANT: CPT

## 2020-11-06 PROCEDURE — 85025 COMPLETE CBC W/AUTO DIFF WBC: CPT

## 2020-11-06 PROCEDURE — 93005 ELECTROCARDIOGRAM TRACING: CPT

## 2020-11-06 PROCEDURE — 36415 COLL VENOUS BLD VENIPUNCTURE: CPT

## 2020-11-06 PROCEDURE — 83880 ASSAY OF NATRIURETIC PEPTIDE: CPT

## 2020-11-06 PROCEDURE — 93010 ELECTROCARDIOGRAM REPORT: CPT | Mod: ,,, | Performed by: INTERNAL MEDICINE

## 2020-11-06 PROCEDURE — 80053 COMPREHEN METABOLIC PANEL: CPT

## 2020-11-06 RX ORDER — HYDROCODONE BITARTRATE AND ACETAMINOPHEN 10; 325 MG/1; MG/1
1 TABLET ORAL EVERY 4 HOURS PRN
Qty: 120 TABLET | Refills: 0 | Status: SHIPPED | OUTPATIENT
Start: 2020-11-06 | End: 2020-11-26 | Stop reason: SDUPTHER

## 2020-11-06 NOTE — ED PROVIDER NOTES
11/6/2020, 11:42 AM   History obtained from the patient      History of Present Illness: Josey Flores is a 50 year old femail patient who presents to the Emergency Department for CP and SOB which onset this morning.    11:49 AM: Pt is stable, VS reviewed. I explained to pt that due to lack of available rooms pt was seen by myself to begin the workup. Pt understands they will be seen and dispositioned by the next available physician. Pt voices understanding and agrees with plan. Pt also understands the longer than normal wait time. I am removing myself from the care of pt and pt will now be assigned to the next available physician.     ALEXANDRO Summers FNP-C    SCRIBE #1 NOTE: I, Michael Domingo, am scribing for, and in the presence of, Stevenson Rubio MD. I have scribed the entire note.      History      Chief Complaint   Patient presents with    Chest Pain     States woke up with chest pain this morning.  States started on Zoloft yesterday for depression.  States abd pain last night. States pressure to chest, no radiation.       Review of patient's allergies indicates:   Allergen Reactions    Nsaids (non-steroidal anti-inflammatory drug) Other (See Comments)     Instructed not to take NSAID drugs with chemo pill.    Vancomycin analogues Itching        HPI   HPI    11/6/2020, 12:32 PM   History obtained from the patient      History of Present Illness: Josey Flores is a 50 y.o. female patient with a PMHx of COPD who presents to the Emergency Department for chest pain, onset this morning. Pt states that she started Zoloft yesterday. Symptoms are constant and moderate in severity. No mitigating or exacerbating factors reported. No associated sxs reported. Patient denies any fever, chills, n/v/d, SOB, weakness, numbness, dizziness, headache, and all other sxs at this time. No prior Tx reported. No further complaints or concerns at this time.     Arrival mode: Personal vehicle    PCP: Primary Doctor No       Past  Medical History:  Past Medical History:   Diagnosis Date    Anxiety     Asthma     Cancer     right breast, metastatic-lymph nodes, bone, and thyroid     COPD (chronic obstructive pulmonary disease)     Depression     History of psychiatric hospitalization     2000; suicidal ideation    Hx of psychiatric care     Hypothyroidism     Miscarriage     five miscarriages    Obesity     Psychiatric problem     Thyroid cancer 2017       Past Surgical History:  Past Surgical History:   Procedure Laterality Date    ABSCESS DRAINAGE      bilateral axilla    ADENOIDECTOMY      APPENDECTOMY      CHOLECYSTECTOMY      MASS EXCISION      SKIN GRAFT  06/16/2017    THYROIDECTOMY Bilateral     TONSILLECTOMY           Family History:  Family History   Problem Relation Age of Onset    Arthritis Mother     Early death Mother         64 at time of death    Heart attack Mother     Heart disease Mother     Heart failure Mother     Hypertension Mother     Coronary artery disease Father     Hearing loss Father     Heart disease Father     Hyperlipidemia Father     Hypertension Father     Mental illness Father     Learning disabilities Son     Cancer Maternal Aunt        Social History:  Social History     Tobacco Use    Smoking status: Current Every Day Smoker     Packs/day: 1.00     Years: 32.00     Pack years: 32.00     Types: Cigarettes     Start date: 1/1/1985     Last attempt to quit: 6/11/2017     Years since quitting: 3.4    Smokeless tobacco: Never Used    Tobacco comment: 45 pack year history   Substance and Sexual Activity    Alcohol use: No    Drug use: No    Sexual activity: Yes     Partners: Male       ROS   Review of Systems   Constitutional: Negative for chills and fever.   HENT: Negative for sore throat.    Respiratory: Negative for shortness of breath.    Cardiovascular: Positive for chest pain.   Gastrointestinal: Negative for diarrhea, nausea and vomiting.   Genitourinary: Negative  "for dysuria.   Musculoskeletal: Negative for back pain.   Skin: Negative for rash.   Neurological: Negative for dizziness, seizures, weakness, light-headedness, numbness and headaches.   Hematological: Does not bruise/bleed easily.   All other systems reviewed and are negative.    Physical Exam      Initial Vitals [11/06/20 1148]   BP Pulse Resp Temp SpO2   106/66 93 18 97.6 °F (36.4 °C) 97 %      MAP       --          Physical Exam  Nursing Notes and Vital Signs Reviewed.  Constitutional: Patient is in no acute distress. Well-developed and well-nourished.  Head: Atraumatic. Normocephalic.  Eyes: PERRL. EOM intact. Conjunctivae are not pale. No scleral icterus.  ENT: Mucous membranes are moist. Oropharynx is clear and symmetric.    Neck: Supple. Full ROM. No lymphadenopathy.  Cardiovascular: Regular rate. Regular rhythm. No murmurs, rubs, or gallops. Distal pulses are 2+ and symmetric.  Pulmonary/Chest: No respiratory distress. Clear to auscultation bilaterally. No wheezing or rales.  Abdominal: Soft and non-distended.  There is no tenderness.  No rebound, guarding, or rigidity.  Musculoskeletal: Moves all extremities. No obvious deformities. No edema.  Skin: Warm and dry.  Neurological:  Alert, awake, and appropriate.  Normal speech.  No acute focal neurological deficits are appreciated.  Psychiatric: Normal affect. Good eye contact. Appropriate in content.    ED Course    Procedures  ED Vital Signs:  Vitals:    11/06/20 1148 11/06/20 1227 11/06/20 1330 11/06/20 1400   BP: 106/66 (!) 109/53 106/69 (!) 143/65   Pulse: 93 82 81 70   Resp: 18 20 20 20   Temp: 97.6 °F (36.4 °C)      TempSrc: Oral      SpO2: 97% 98% 98% 96%   Weight: 97.7 kg (215 lb 8 oz)      Height: 5' 7" (1.702 m)       11/06/20 1430   BP: 119/67   Pulse: 84   Resp: 20   Temp:    TempSrc:    SpO2: 97%   Weight:    Height:        Abnormal Lab Results:  Labs Reviewed   CBC W/ AUTO DIFFERENTIAL - Abnormal; Notable for the following components:       " Result Value    RBC 3.81 (*)      (*)     MCH 35.4 (*)     Immature Granulocytes 0.7 (*)     All other components within normal limits   COMPREHENSIVE METABOLIC PANEL - Abnormal; Notable for the following components:    eGFR if non  52 (*)     All other components within normal limits   TROPONIN I   B-TYPE NATRIURETIC PEPTIDE   TROPONIN I        All Lab Results:  Results for orders placed or performed during the hospital encounter of 11/06/20   CBC auto differential   Result Value Ref Range    WBC 5.65 3.90 - 12.70 K/uL    RBC 3.81 (L) 4.00 - 5.40 M/uL    Hemoglobin 13.5 12.0 - 16.0 g/dL    Hematocrit 40.5 37.0 - 48.5 %     (H) 82 - 98 fL    MCH 35.4 (H) 27.0 - 31.0 pg    MCHC 33.3 32.0 - 36.0 g/dL    RDW 12.9 11.5 - 14.5 %    Platelets 231 150 - 350 K/uL    MPV 11.0 9.2 - 12.9 fL    Immature Granulocytes 0.7 (H) 0.0 - 0.5 %    Gran # (ANC) 3.7 1.8 - 7.7 K/uL    Immature Grans (Abs) 0.04 0.00 - 0.04 K/uL    Lymph # 1.3 1.0 - 4.8 K/uL    Mono # 0.4 0.3 - 1.0 K/uL    Eos # 0.1 0.0 - 0.5 K/uL    Baso # 0.06 0.00 - 0.20 K/uL    nRBC 0 0 /100 WBC    Gran % 66.0 38.0 - 73.0 %    Lymph % 23.0 18.0 - 48.0 %    Mono % 7.3 4.0 - 15.0 %    Eosinophil % 1.9 0.0 - 8.0 %    Basophil % 1.1 0.0 - 1.9 %    Differential Method Automated    Comprehensive metabolic panel   Result Value Ref Range    Sodium 140 136 - 145 mmol/L    Potassium 4.2 3.5 - 5.1 mmol/L    Chloride 105 95 - 110 mmol/L    CO2 24 23 - 29 mmol/L    Glucose 99 70 - 110 mg/dL    BUN 6 6 - 20 mg/dL    Creatinine 1.2 0.5 - 1.4 mg/dL    Calcium 9.3 8.7 - 10.5 mg/dL    Total Protein 7.3 6.0 - 8.4 g/dL    Albumin 3.9 3.5 - 5.2 g/dL    Total Bilirubin 0.5 0.1 - 1.0 mg/dL    Alkaline Phosphatase 70 55 - 135 U/L    AST 20 10 - 40 U/L    ALT 26 10 - 44 U/L    Anion Gap 11 8 - 16 mmol/L    eGFR if African American >60 >60 mL/min/1.73 m^2    eGFR if non African American 52 (A) >60 mL/min/1.73 m^2   Troponin I #1   Result Value Ref Range    Troponin  I <0.006 0.000 - 0.026 ng/mL   BNP   Result Value Ref Range    BNP <10 0 - 99 pg/mL     Imaging Results:  Imaging Results          X-Ray Chest AP Portable (Final result)  Result time 11/06/20 15:02:49    Final result by NARENDRA Mitchell Sr., MD (11/06/20 15:02:49)                 Impression:      1. There is no evidence of an acute pulmonary process.  2. There are calcified right perihilar lymph nodes. There are several calcified granulomas projected over the lateral aspect of the midportion of the right lung. The larger 1 measures 10 mm.  This is characteristic of a prior granulomatous infection.  .      Electronically signed by: Ton Mitchell MD  Date:    11/06/2020  Time:    15:02             Narrative:    EXAMINATION:  XR CHEST AP PORTABLE    CLINICAL HISTORY:  chest pain;    COMPARISON:  09/17/2020    FINDINGS:  The size of the heart is normal.  There is no evidence of an acute pulmonary process.  There are calcified right perihilar lymph nodes.  There are several calcified granulomas projected over the lateral aspect of the midportion of the right lung.  The larger 1 measures 10 mm.  There is no pneumothorax.  The costophrenic angles are sharp.                               2:50 PM: Per ED physician, pt's CXR results: No acute findings.    The EKG was ordered, reviewed, and independently interpreted by the ED provider.  Interpretation time: 11:51  Rate: 95 BPM  Rhythm: normal sinus rhythm  Interpretation: Lateral infarct (cited on or before 10-Sep-2020). ST and T wave abnormality, consider inferior ischemia. No STEMI.           The Emergency Provider reviewed the vital signs and test results, which are outlined above.    ED Discussion     2:50 PM: Reassessed pt at this time. Discussed with pt all pertinent ED information and results. Discussed pt dx and plan of tx. Gave pt all f/u and return to the ED instructions. All questions and concerns were addressed at this time. Pt expresses understanding of  information and instructions, and is comfortable with plan to discharge. Pt is stable for discharge.    I have discussed with patient and/or family/caretaker chest pain precautions, specifically to return for worsening chest pain, shortness of breath, fever, or any concern.  I have low suspicion for cardiopulmonary, vascular, infectious, respiratory, or other emergent medical condition based on my evaluation in the ED.    I discussed with patient and/or family/caretaker that evaluation in the ED does not suggest any emergent or life threatening medical conditions requiring immediate intervention beyond what was provided in the ED, and I believe patient is safe for discharge.  Regardless, an unremarkable evaluation in the ED does not preclude the development or presence of a serious of life threatening condition. As such, patient was instructed to return immediately for any worsening or change in current symptoms.         ED Medication(s):  Medications - No data to display    Follow-up Information     [facility] In 2 days.    Contact information:  [address] [facility] [address] [phone]                  New Prescriptions    No medications on file         Medical Decision Making    Medical Decision Making:   Clinical Tests:   Lab Tests: Ordered and Reviewed  Radiological Study: Ordered and Reviewed  Medical Tests: Ordered and Reviewed           Scribe Attestation:   Scribe #1: I performed the above scribed service and the documentation accurately describes the services I performed. I attest to the accuracy of the note.    Attending:   Physician Attestation Statement for Scribe #1: I, Stevenson Rubio MD, personally performed the services described in this documentation, as scribed by Michael Domingo, in my presence, and it is both accurate and complete.          Clinical Impression       ICD-10-CM ICD-9-CM   1. Chest pain  R07.9 786.50       Disposition:   Disposition: Discharged  Condition:  Stable           Stevenson Rubio MD  11/06/20 1523       Stevenson Rubio MD  11/19/20 0601

## 2020-11-06 NOTE — ED NOTES
Assumed care of pt at this time.    Pt c/o midsternal CP, upper abd pain, and nausea - onset yesterday. Pain rated 8/10. Reports symptoms began after taking new Rx of Zoloft yesterday. Denies fever, diarrhea, SOB, HA, weakness, numbness, tingling, vision changes or any other symptoms at this time.    Patient moved to ED room 21, patient assisted onto stretcher and changed into a gown. Patient placed on cardiac monitor, continuous pulse oximetry and automatic blood pressure cuff. Bed placed in low locked position, side rails up x 2, call light is within reach of patient or family, orientation to room and explanation of wait provided to family and patient, alarms set and turned on for monitor and pulse ox, will continue to monitor.    Patient identifies self as Josey Flores    LOC: The patient is awake, alert and aware of environment with an appropriate affect, the patient is oriented x 3 and speaking appropriately.  APPEARANCE: Patient resting comfortably and in no acute distress, patient is clean and well groomed, patient's clothing is properly fastened.  SKIN: The skin is warm and dry, color consistent with ethnicity, patient has normal skin turgor and moist mucus membranes, skin intact, no breakdown or bruising noted.  MUSCULOSKELETAL: Patient moving all extremities well, no obvious swelling or deformities noted.  RESPIRATORY: Airway is open and patent, respirations are spontaneous, patient has a normal effort and rate, no accessory muscle use noted.  CARDIAC: Patient has a normal rate and rhythm, no peripheral edema noted, capillary refill < 3 seconds.  ABDOMEN: Soft and non tender to palpation, no distention noted.  NEUROLOGIC: PERRL, eyes open spontaneously, behavior appropriate to situation, follows commands, facial expression symmetrical, bilateral hand grasp equal and even, purposeful motor response noted, normal sensation in all extremities when touched with a finger.

## 2020-11-25 ENCOUNTER — PATIENT MESSAGE (OUTPATIENT)
Dept: ENDOCRINOLOGY | Facility: CLINIC | Age: 50
End: 2020-11-25

## 2020-11-26 DIAGNOSIS — G89.3 CANCER ASSOCIATED PAIN: ICD-10-CM

## 2020-11-30 RX ORDER — HYDROCODONE BITARTRATE AND ACETAMINOPHEN 10; 325 MG/1; MG/1
1 TABLET ORAL EVERY 4 HOURS PRN
Qty: 120 TABLET | Refills: 0 | Status: SHIPPED | OUTPATIENT
Start: 2020-11-30 | End: 2020-12-19 | Stop reason: SDUPTHER

## 2020-12-01 DIAGNOSIS — F41.9 ANXIETY: ICD-10-CM

## 2020-12-01 DIAGNOSIS — G89.3 CANCER ASSOCIATED PAIN: ICD-10-CM

## 2020-12-02 RX ORDER — ALPRAZOLAM 1 MG/1
.5-1 TABLET ORAL 2 TIMES DAILY PRN
Qty: 60 TABLET | Refills: 0 | Status: SHIPPED | OUTPATIENT
Start: 2020-12-02 | End: 2020-12-28 | Stop reason: SDUPTHER

## 2020-12-02 RX ORDER — MORPHINE SULFATE 15 MG/1
15 TABLET, FILM COATED, EXTENDED RELEASE ORAL EVERY 12 HOURS
Qty: 60 TABLET | Refills: 0 | Status: SHIPPED | OUTPATIENT
Start: 2020-12-02 | End: 2020-12-28 | Stop reason: SDUPTHER

## 2020-12-05 ENCOUNTER — HOSPITAL ENCOUNTER (EMERGENCY)
Facility: HOSPITAL | Age: 50
Discharge: HOME | End: 2020-12-05
Attending: EMERGENCY MEDICINE
Payer: MEDICAID

## 2020-12-05 VITALS
BODY MASS INDEX: 33.98 KG/M2 | DIASTOLIC BLOOD PRESSURE: 67 MMHG | WEIGHT: 216.5 LBS | HEART RATE: 74 BPM | OXYGEN SATURATION: 98 % | RESPIRATION RATE: 20 BRPM | SYSTOLIC BLOOD PRESSURE: 113 MMHG | TEMPERATURE: 97 F | HEIGHT: 67 IN

## 2020-12-05 DIAGNOSIS — R31.9 HEMATURIA: ICD-10-CM

## 2020-12-05 DIAGNOSIS — N39.0 URINARY TRACT INFECTION WITH HEMATURIA, SITE UNSPECIFIED: Primary | ICD-10-CM

## 2020-12-05 DIAGNOSIS — R31.9 URINARY TRACT INFECTION WITH HEMATURIA, SITE UNSPECIFIED: Primary | ICD-10-CM

## 2020-12-05 LAB
ALBUMIN SERPL BCP-MCNC: 3.9 G/DL (ref 3.5–5.2)
ALP SERPL-CCNC: 77 U/L (ref 55–135)
ALT SERPL W/O P-5'-P-CCNC: 71 U/L (ref 10–44)
ANION GAP SERPL CALC-SCNC: 12 MMOL/L (ref 8–16)
AST SERPL-CCNC: 62 U/L (ref 10–40)
BASOPHILS # BLD AUTO: 0.07 K/UL (ref 0–0.2)
BASOPHILS NFR BLD: 1.3 % (ref 0–1.9)
BILIRUB SERPL-MCNC: 0.5 MG/DL (ref 0.1–1)
BILIRUB UR QL STRIP: NEGATIVE
BUN SERPL-MCNC: 8 MG/DL (ref 6–20)
CALCIUM SERPL-MCNC: 9.1 MG/DL (ref 8.7–10.5)
CHLORIDE SERPL-SCNC: 101 MMOL/L (ref 95–110)
CLARITY UR: CLEAR
CO2 SERPL-SCNC: 26 MMOL/L (ref 23–29)
COLOR UR: YELLOW
CREAT SERPL-MCNC: 1.2 MG/DL (ref 0.5–1.4)
DIFFERENTIAL METHOD: ABNORMAL
EOSINOPHIL # BLD AUTO: 0.1 K/UL (ref 0–0.5)
EOSINOPHIL NFR BLD: 1.5 % (ref 0–8)
ERYTHROCYTE [DISTWIDTH] IN BLOOD BY AUTOMATED COUNT: 13.7 % (ref 11.5–14.5)
EST. GFR  (AFRICAN AMERICAN): >60 ML/MIN/1.73 M^2
EST. GFR  (NON AFRICAN AMERICAN): 52 ML/MIN/1.73 M^2
GLUCOSE SERPL-MCNC: 98 MG/DL (ref 70–110)
GLUCOSE UR QL STRIP: NEGATIVE
HCT VFR BLD AUTO: 38.5 % (ref 37–48.5)
HCV AB SERPL QL IA: NEGATIVE
HGB BLD-MCNC: 13 G/DL (ref 12–16)
HGB UR QL STRIP: ABNORMAL
IMM GRANULOCYTES # BLD AUTO: 0.09 K/UL (ref 0–0.04)
IMM GRANULOCYTES NFR BLD AUTO: 1.7 % (ref 0–0.5)
KETONES UR QL STRIP: NEGATIVE
LEUKOCYTE ESTERASE UR QL STRIP: ABNORMAL
LIPASE SERPL-CCNC: 19 U/L (ref 4–60)
LYMPHOCYTES # BLD AUTO: 1.4 K/UL (ref 1–4.8)
LYMPHOCYTES NFR BLD: 26.3 % (ref 18–48)
MCH RBC QN AUTO: 36.3 PG (ref 27–31)
MCHC RBC AUTO-ENTMCNC: 33.8 G/DL (ref 32–36)
MCV RBC AUTO: 108 FL (ref 82–98)
MICROSCOPIC COMMENT: ABNORMAL
MONOCYTES # BLD AUTO: 0.5 K/UL (ref 0.3–1)
MONOCYTES NFR BLD: 10.1 % (ref 4–15)
NEUTROPHILS # BLD AUTO: 3.1 K/UL (ref 1.8–7.7)
NEUTROPHILS NFR BLD: 59.1 % (ref 38–73)
NITRITE UR QL STRIP: NEGATIVE
NRBC BLD-RTO: 0 /100 WBC
PH UR STRIP: 6 [PH] (ref 5–8)
PLATELET # BLD AUTO: 235 K/UL (ref 150–350)
PMV BLD AUTO: 10.7 FL (ref 9.2–12.9)
POTASSIUM SERPL-SCNC: 3.8 MMOL/L (ref 3.5–5.1)
PROT SERPL-MCNC: 7.2 G/DL (ref 6–8.4)
PROT UR QL STRIP: NEGATIVE
RBC # BLD AUTO: 3.58 M/UL (ref 4–5.4)
RBC #/AREA URNS HPF: 25 /HPF (ref 0–4)
SODIUM SERPL-SCNC: 139 MMOL/L (ref 136–145)
SP GR UR STRIP: 1.01 (ref 1–1.03)
URN SPEC COLLECT METH UR: ABNORMAL
UROBILINOGEN UR STRIP-ACNC: 1 EU/DL
WBC # BLD AUTO: 5.25 K/UL (ref 3.9–12.7)
WBC #/AREA URNS HPF: 15 /HPF (ref 0–5)

## 2020-12-05 PROCEDURE — 99284 EMERGENCY DEPT VISIT MOD MDM: CPT | Mod: 25

## 2020-12-05 PROCEDURE — 25000003 PHARM REV CODE 250: Performed by: EMERGENCY MEDICINE

## 2020-12-05 PROCEDURE — 87086 URINE CULTURE/COLONY COUNT: CPT

## 2020-12-05 PROCEDURE — 87088 URINE BACTERIA CULTURE: CPT

## 2020-12-05 PROCEDURE — 81000 URINALYSIS NONAUTO W/SCOPE: CPT

## 2020-12-05 PROCEDURE — 87077 CULTURE AEROBIC IDENTIFY: CPT

## 2020-12-05 PROCEDURE — 83690 ASSAY OF LIPASE: CPT

## 2020-12-05 PROCEDURE — 36415 COLL VENOUS BLD VENIPUNCTURE: CPT

## 2020-12-05 PROCEDURE — 86803 HEPATITIS C AB TEST: CPT

## 2020-12-05 PROCEDURE — 96360 HYDRATION IV INFUSION INIT: CPT

## 2020-12-05 PROCEDURE — 87186 SC STD MICRODIL/AGAR DIL: CPT

## 2020-12-05 PROCEDURE — 80053 COMPREHEN METABOLIC PANEL: CPT

## 2020-12-05 PROCEDURE — 85025 COMPLETE CBC W/AUTO DIFF WBC: CPT

## 2020-12-05 RX ORDER — NITROFURANTOIN 25; 75 MG/1; MG/1
100 CAPSULE ORAL
Status: COMPLETED | OUTPATIENT
Start: 2020-12-05 | End: 2020-12-05

## 2020-12-05 RX ORDER — NITROFURANTOIN 25; 75 MG/1; MG/1
100 CAPSULE ORAL 2 TIMES DAILY
Qty: 10 CAPSULE | Refills: 0 | Status: SHIPPED | OUTPATIENT
Start: 2020-12-05 | End: 2020-12-10

## 2020-12-05 RX ADMIN — SODIUM CHLORIDE 1000 ML: 0.9 INJECTION, SOLUTION INTRAVENOUS at 03:12

## 2020-12-05 RX ADMIN — NITROFURANTOIN (MONOHYDRATE/MACROCRYSTALS) 100 MG: 75; 25 CAPSULE ORAL at 07:12

## 2020-12-05 NOTE — ED PROVIDER NOTES
SCRIBE #1 NOTE: I, Jose Martin Lamar, am scribing for, and in the presence of, Mervin Smith Jr., MD. I have scribed the entire note.      History      Chief Complaint   Patient presents with    Vaginal Bleeding     pt states she was in the store and felt cramping, went to the bathroom and found bright red blood in pants, post menopausal, hx of Cancer of breast with mets to bones and thyroid.       Review of patient's allergies indicates:   Allergen Reactions    Nsaids (non-steroidal anti-inflammatory drug) Other (See Comments)     Instructed not to take NSAID drugs with chemo pill.    Vancomycin analogues Itching        HPI   HPI    12/5/2020, 2:56 PM   History obtained from the patient      History of Present Illness: Josey Flores is a 50 y.o. female patient with a PMHx of breast cancers with mets and COPD who presents to the Emergency Department for vaginal bleeding which onset gradually PTA. Pt states she was in the store and felt abdominal pressures, went to the bathroom, and found a natalya colored stain in pants. Pt states she has been post menopausal for the past 10-13 years; pt states she was referred to have a hysterectomy 10 years ago but never had the procedure performed.  Pt states the blood is coming from her urine, not vagina. Symptoms are constant and moderate in severity. No mitigating or exacerbating factors reported. Associated sxs include nausea. Patient denies any fever, chills, CP, SOB, vomiting, diarrhea, dysuria, and all other sxs at this time. No prior Tx included. Pt states she takes Ibrance for cancer therapy and reports no missed doses. Pt states her Hem/Onc doctor is Dr. Asencio. No further complaints or concerns at this time.         Arrival mode: Personal vehicle     PCP: Primary Doctor No       Past Medical History:  Past Medical History:   Diagnosis Date    Anxiety     Asthma     Cancer     right breast, metastatic-lymph nodes, bone, and thyroid     COPD (chronic obstructive  pulmonary disease)     Depression     History of psychiatric hospitalization     2000; suicidal ideation    Hx of psychiatric care     Hypothyroidism     Miscarriage     five miscarriages    Obesity     Psychiatric problem     Thyroid cancer 2017       Past Surgical History:  Past Surgical History:   Procedure Laterality Date    ABSCESS DRAINAGE      bilateral axilla    ADENOIDECTOMY      APPENDECTOMY      CHOLECYSTECTOMY      MASS EXCISION      SKIN GRAFT  06/16/2017    THYROIDECTOMY Bilateral     TONSILLECTOMY           Family History:  Family History   Problem Relation Age of Onset    Arthritis Mother     Early death Mother         64 at time of death    Heart attack Mother     Heart disease Mother     Heart failure Mother     Hypertension Mother     Coronary artery disease Father     Hearing loss Father     Heart disease Father     Hyperlipidemia Father     Hypertension Father     Mental illness Father     Learning disabilities Son     Cancer Maternal Aunt        Social History:  Social History     Tobacco Use    Smoking status: Current Every Day Smoker     Packs/day: 1.00     Years: 32.00     Pack years: 32.00     Types: Cigarettes     Start date: 1/1/1985     Last attempt to quit: 6/11/2017     Years since quitting: 3.4    Smokeless tobacco: Never Used    Tobacco comment: 45 pack year history   Substance and Sexual Activity    Alcohol use: No    Drug use: No    Sexual activity: Yes     Partners: Male       ROS   Review of Systems   Constitutional: Negative for diaphoresis and fever.   HENT: Negative for sore throat.    Respiratory: Negative for shortness of breath.    Cardiovascular: Negative for chest pain.   Gastrointestinal: Positive for nausea. Negative for diarrhea and vomiting.   Genitourinary: Positive for vaginal bleeding. Negative for dysuria.   Musculoskeletal: Negative for back pain.   Skin: Negative for rash.   Neurological: Negative for weakness.  "  Hematological: Does not bruise/bleed easily.   All other systems reviewed and are negative.    Physical Exam      Initial Vitals [12/05/20 1441]   BP Pulse Resp Temp SpO2   139/61 83 16 98 °F (36.7 °C) 95 %      MAP       --          Physical Exam  Nursing Notes and Vital Signs Reviewed.  Constitutional: Patient is in no acute distress. Well-developed and well-nourished.  Head: Atraumatic. Normocephalic.  Eyes: PERRL. EOM intact. Conjunctivae are not pale. No scleral icterus.  ENT: Mucous membranes are moist. Oropharynx is clear and symmetric.    Neck: Supple. Full ROM. No lymphadenopathy.  Cardiovascular: Regular rate. Regular rhythm. No murmurs, rubs, or gallops. Distal pulses are 2+ and symmetric.  Pulmonary/Chest: No respiratory distress. Clear to auscultation bilaterally. No wheezing or rales.  Abdominal: Soft and non-distended.  There is mild suprapubic tenderness.  No rebound, guarding, or rigidity. Good bowel sounds.  Genitourinary: No CVA tenderness.  Pt declined pelvic exam at this point in time. Stating the blood is in her urine.  Musculoskeletal: Moves all extremities. No obvious deformities. No edema. No calf tenderness.  Skin: Warm and dry.  Neurological:  GCS 15. Alert, awake, and appropriate.  Normal speech.  No acute focal neurological deficits are appreciated.  Psychiatric: Normal affect. Good eye contact. Appropriate in content.    ED Course    Procedures  ED Vital Signs:  Vitals:    12/05/20 1441 12/05/20 1601 12/05/20 1709 12/05/20 1900   BP: 139/61 (!) 117/58 130/60 104/68   Pulse: 83 71 78 72   Resp: 16 18 20    Temp: 98 °F (36.7 °C)      TempSrc: Oral      SpO2: 95% 97% 99% 96%   Weight: 98.2 kg (216 lb 7.9 oz)      Height: 5' 7" (1.702 m)       12/05/20 2004   BP: 113/67   Pulse: 74   Resp: 20   Temp: 97.3 °F (36.3 °C)   TempSrc: Axillary   SpO2: 98%   Weight:    Height:        Abnormal Lab Results:  Labs Reviewed   CBC W/ AUTO DIFFERENTIAL - Abnormal; Notable for the following components: "       Result Value    RBC 3.58 (*)      (*)     MCH 36.3 (*)     Immature Granulocytes 1.7 (*)     Immature Grans (Abs) 0.09 (*)     All other components within normal limits   COMPREHENSIVE METABOLIC PANEL - Abnormal; Notable for the following components:    AST 62 (*)     ALT 71 (*)     eGFR if non  52 (*)     All other components within normal limits   URINALYSIS, REFLEX TO URINE CULTURE - Abnormal; Notable for the following components:    Occult Blood UA 3+ (*)     Leukocytes, UA 2+ (*)     All other components within normal limits    Narrative:     Specimen Source->Urine   URINALYSIS MICROSCOPIC - Abnormal; Notable for the following components:    RBC, UA 25 (*)     WBC, UA 15 (*)     All other components within normal limits    Narrative:     Specimen Source->Urine   CULTURE, URINE   HEPATITIS C ANTIBODY   LIPASE        All Lab Results:  Results for orders placed or performed during the hospital encounter of 12/05/20   Hepatitis C antibody   Result Value Ref Range    Hepatitis C Ab Negative Negative   CBC W/ AUTO DIFFERENTIAL   Result Value Ref Range    WBC 5.25 3.90 - 12.70 K/uL    RBC 3.58 (L) 4.00 - 5.40 M/uL    Hemoglobin 13.0 12.0 - 16.0 g/dL    Hematocrit 38.5 37.0 - 48.5 %     (H) 82 - 98 fL    MCH 36.3 (H) 27.0 - 31.0 pg    MCHC 33.8 32.0 - 36.0 g/dL    RDW 13.7 11.5 - 14.5 %    Platelets 235 150 - 350 K/uL    MPV 10.7 9.2 - 12.9 fL    Immature Granulocytes 1.7 (H) 0.0 - 0.5 %    Gran # (ANC) 3.1 1.8 - 7.7 K/uL    Immature Grans (Abs) 0.09 (H) 0.00 - 0.04 K/uL    Lymph # 1.4 1.0 - 4.8 K/uL    Mono # 0.5 0.3 - 1.0 K/uL    Eos # 0.1 0.0 - 0.5 K/uL    Baso # 0.07 0.00 - 0.20 K/uL    nRBC 0 0 /100 WBC    Gran % 59.1 38.0 - 73.0 %    Lymph % 26.3 18.0 - 48.0 %    Mono % 10.1 4.0 - 15.0 %    Eosinophil % 1.5 0.0 - 8.0 %    Basophil % 1.3 0.0 - 1.9 %    Differential Method Automated    Comp. Metabolic Panel   Result Value Ref Range    Sodium 139 136 - 145 mmol/L    Potassium 3.8  3.5 - 5.1 mmol/L    Chloride 101 95 - 110 mmol/L    CO2 26 23 - 29 mmol/L    Glucose 98 70 - 110 mg/dL    BUN 8 6 - 20 mg/dL    Creatinine 1.2 0.5 - 1.4 mg/dL    Calcium 9.1 8.7 - 10.5 mg/dL    Total Protein 7.2 6.0 - 8.4 g/dL    Albumin 3.9 3.5 - 5.2 g/dL    Total Bilirubin 0.5 0.1 - 1.0 mg/dL    Alkaline Phosphatase 77 55 - 135 U/L    AST 62 (H) 10 - 40 U/L    ALT 71 (H) 10 - 44 U/L    Anion Gap 12 8 - 16 mmol/L    eGFR if African American >60 >60 mL/min/1.73 m^2    eGFR if non African American 52 (A) >60 mL/min/1.73 m^2   Lipase   Result Value Ref Range    Lipase 19 4 - 60 U/L   Urinalysis, Reflex to Urine Culture Urine, Clean Catch    Specimen: Urine   Result Value Ref Range    Specimen UA Urine, Clean Catch     Color, UA Yellow Yellow, Straw, [place]    Appearance, UA Clear Clear    pH, UA 6.0 5.0 - 8.0    Specific Gravity, UA 1.010 1.005 - 1.030    Protein, UA Negative Negative    Glucose, UA Negative Negative    Ketones, UA Negative Negative    Bilirubin (UA) Negative Negative    Occult Blood UA 3+ (A) Negative    Nitrite, UA Negative Negative    Urobilinogen, UA 1.0 <2.0 EU/dL    Leukocytes, UA 2+ (A) Negative   Urinalysis Microscopic   Result Value Ref Range    RBC, UA 25 (H) 0 - 4 /hpf    WBC, UA 15 (H) 0 - 5 /hpf    Microscopic Comment SEE COMMENT        Imaging Results:  Imaging Results          X-Ray Abdomen Flat And Erect (Final result)  Result time 12/05/20 17:05:51    Final result by Raphael Morris MD (12/05/20 17:05:51)                 Impression:      Nonobstructive small bowel gas pattern.      Electronically signed by: Raphael Morris  Date:    12/05/2020  Time:    17:05             Narrative:    EXAMINATION:  XR ABDOMEN FLAT AND ERECT    CLINICAL HISTORY:  Hematuria, unspecified    TECHNIQUE:  Flat and erect AP views of the abdomen were performed.    COMPARISON:  06/10/2012.    FINDINGS:  No air-fluid levels on upright radiograph.  No dilated loops of small bowel on supine radiograph.    Stool  burden within the colon is within normal limits.    No calculi overlie the renal shadows.    Lung bases are clear.                                        The Emergency Provider reviewed the vital signs and test results, which are outlined above.    ED Discussion     2:58 PM: Pt states issues with pelvic exams in the past and declined pelvic exam at this point in time. This will be discussed further if pelvic exam is needed.     7:35 PM: Re-evaluated pt. Pt is resting comfortably and is in no acute distress.  Pt states she went to the bathroom with no gross blood in her urine currently. I will set the patient up for discharge and instructed the pt to follow up with PCP.  D/w pt all pertinent results. D/w pt any concerns expressed at this time. Answered all questions. Pt expresses understanding at this time.    7:45 PM: Reassessed pt at this time.  Pt states her condition has improved at this time. Discussed with pt all pertinent ED information and results. Discussed pt dx and plan of tx. Gave pt all f/u and return to the ED instructions. All questions and concerns were addressed at this time. Pt expresses understanding of information and instructions, and is comfortable with plan to discharge. Pt is stable for discharge.    I discussed with patient and/or family/caretaker that evaluation in the ED does not suggest any emergent or life threatening medical conditions requiring immediate intervention beyond what was provided in the ED, and I believe patient is safe for discharge.  Regardless, an unremarkable evaluation in the ED does not preclude the development or presence of a serious of life threatening condition. As such, patient was instructed to return immediately for any worsening or change in current symptoms.         ED Medication(s):  Medications   sodium chloride 0.9% bolus 1,000 mL (0 mLs Intravenous Stopped 12/5/20 7919)   nitrofurantoin (macrocrystal-monohydrate) 100 MG capsule 100 mg (100 mg Oral Given  12/5/20 1959)     Discharge Medication List as of 12/5/2020  7:45 PM      START taking these medications    Details   nitrofurantoin, macrocrystal-monohydrate, (MACROBID) 100 MG capsule Take 1 capsule (100 mg total) by mouth 2 (two) times daily. for 5 days, Starting Sat 12/5/2020, Until Thu 12/10/2020, Print              Medication List      START taking these medications    nitrofurantoin (macrocrystal-monohydrate) 100 MG capsule  Commonly known as: MACROBID  Take 1 capsule (100 mg total) by mouth 2 (two) times daily. for 5 days        ASK your doctor about these medications    albuterol 2.5 mg /3 mL (0.083 %) nebulizer solution  Commonly known as: PROVENTIL  Take 3 mLs (2.5 mg total) by nebulization every 6 (six) hours while awake.     ALPRAZolam 1 MG tablet  Commonly known as: XANAX  Take 0.5-1 tablets (0.5-1 mg total) by mouth 2 (two) times daily as needed for Anxiety.     benzonatate 200 MG capsule  Commonly known as: TESSALON  Take 1 capsule (200 mg total) by mouth 3 (three) times daily as needed for Cough.     budesonide 0.5 mg/2 mL nebulizer solution  Commonly known as: PULMICORT  Take 2 mLs (0.5 mg total) by nebulization 2 (two) times daily. Wash out mouth after using.     calcium carbonate 400 mg calcium (1,000 mg) Chew     cyanocobalamin 1000 MCG tablet  Commonly known as: VITAMIN B-12  Take 1 tablet (1,000 mcg total) by mouth once daily.     EUTHYROX 150 MCG tablet  Generic drug: levothyroxine  Take 1 tablet by mouth once daily     HYDROcodone-acetaminophen  mg per tablet  Commonly known as: NORCO  Take 1 tablet by mouth every 4 (four) hours as needed for Pain.     * IBRANCE 125 mg Cap  Generic drug: palbociclib  TAKE ONE CAPSULE BY MOUTH ONCE DAILY. SWALLOW WHOLE. DO NOT TAKE IF CAPSULES ARE BROKEN OR CRACKED. AVOID GRAPEFRUIT PRODUCTS     * IBRANCE 125 mg Tab  Generic drug: palbociclib  Take 125 mg by mouth once daily. Take 125mg once daily for 21 days, followed by 7 days off, repeat every 28  days     ipratropium 42 mcg (0.06 %) nasal spray  Commonly known as: ATROVENT  2 sprays by Nasal route 4 (four) times daily. As needed for rhinitis. Take in evening before bed and in the morning     letrozole 2.5 mg Tab  Commonly known as: FEMARA  Take 1 tablet (2.5 mg total) by mouth once daily.     morphine 15 MG 12 hr tablet  Commonly known as: MS CONTIN  Take 1 tablet (15 mg total) by mouth every 12 (twelve) hours.     multivitamin per tablet  Commonly known as: THERAGRAN     ondansetron 4 MG tablet  Commonly known as: ZOFRAN  Take 1-2 tablets (4-8 mg total) by mouth 2 (two) times daily.     sertraline 25 MG tablet  Commonly known as: ZOLOFT  Take 1 tablet (25 mg total) by mouth once daily.     VITAMIN D2 50,000 unit Cap  Generic drug: ergocalciferol         * This list has 2 medication(s) that are the same as other medications prescribed for you. Read the directions carefully, and ask your doctor or other care provider to review them with you.               Where to Get Your Medications      You can get these medications from any pharmacy    Bring a paper prescription for each of these medications  · nitrofurantoin (macrocrystal-monohydrate) 100 MG capsule           Follow-up Information     [facility]. Schedule an appointment as soon as possible for a visit in 1 week.    Specialty: Internal Medicine  Contact information:  [address] [facility] [address] [phone]  Additional information:  2nd Floor - From [address], take the [facility] exit ([address] Enter the facility from the [facility]           [facility] - .    Specialty: Emergency Medicine  Why: As needed, If symptoms worsen  Contact information:  [address]  [facility] [address] [phone]                   Medical Decision Making    Medical Decision Making:   Clinical Tests:   Lab Tests: Ordered and Reviewed  Radiological Study: Ordered and Reviewed  Medical  Tests: Ordered and Reviewed           Scribe Attestation:   Scribe #1: I performed the above scribed service and the documentation accurately describes the services I performed. I attest to the accuracy of the note.    Attending:   Physician Attestation Statement for Scribe #1: I, Mervin Smith Jr., MD, personally performed the services described in this documentation, as scribed by Jose Martin Lamar, in my presence, and it is both accurate and complete.          Clinical Impression       ICD-10-CM ICD-9-CM   1. Urinary tract infection with hematuria, site unspecified  N39.0 599.0    R31.9 599.70   2. Hematuria  R31.9 599.70       Disposition:   Disposition: Discharged  Condition: Stable         Mervin Smith Jr., MD  12/06/20 1137

## 2020-12-06 NOTE — DISCHARGE INSTRUCTIONS
For URINARY TRACT INFECTION, I instructed patient to avoid holding in urine and recommended that she urinate when she feels the urge.  Also advised patient to drink plenty of liquids and reminded patient that she may need to drink more fluids than usual to help flush out the bacteria. Instructed patient to avoid alcohol, caffeine, and citrus juices as these substances can irritate your bladder and increase your symptoms.  Also recommended that patient apply heat to lower abdomen for 20 to 30 minutes every two hours to help decrease discomfort and pressure in the bladder.    Regarding HEMATURIA,  I instructed patient to report: any discomfort with urination, urinary frequency or urgency; unexplained weight loss; develop fever, nausea, vomiting, shaking chills, or pain in your abdomen, side, or back; are unable to urinate; are passing blood clots in urine; or have urine dribbling, nighttime urination, or difficulty starting urine flow.

## 2020-12-07 ENCOUNTER — TELEPHONE (OUTPATIENT)
Dept: OBSTETRICS AND GYNECOLOGY | Facility: CLINIC | Age: 50
End: 2020-12-07

## 2020-12-07 ENCOUNTER — PROCEDURE VISIT (OUTPATIENT)
Dept: OBSTETRICS AND GYNECOLOGY | Facility: CLINIC | Age: 50
End: 2020-12-07
Payer: MEDICAID

## 2020-12-07 VITALS
HEIGHT: 67 IN | BODY MASS INDEX: 33.67 KG/M2 | SYSTOLIC BLOOD PRESSURE: 116 MMHG | DIASTOLIC BLOOD PRESSURE: 72 MMHG | WEIGHT: 214.5 LBS

## 2020-12-07 DIAGNOSIS — Z17.0 MALIGNANT NEOPLASM OF OVERLAPPING SITES OF RIGHT BREAST IN FEMALE, ESTROGEN RECEPTOR POSITIVE: ICD-10-CM

## 2020-12-07 DIAGNOSIS — Z12.4 SCREENING FOR CERVICAL CANCER: ICD-10-CM

## 2020-12-07 DIAGNOSIS — N95.0 POSTMENOPAUSAL BLEEDING: Primary | ICD-10-CM

## 2020-12-07 DIAGNOSIS — C50.811 MALIGNANT NEOPLASM OF OVERLAPPING SITES OF RIGHT BREAST IN FEMALE, ESTROGEN RECEPTOR POSITIVE: ICD-10-CM

## 2020-12-07 DIAGNOSIS — R87.613 HSIL (HIGH GRADE SQUAMOUS INTRAEPITHELIAL LESION) ON PAP SMEAR OF CERVIX: ICD-10-CM

## 2020-12-07 PROCEDURE — 88341 IMHCHEM/IMCYTCHM EA ADD ANTB: CPT | Mod: 26,,, | Performed by: PATHOLOGY

## 2020-12-07 PROCEDURE — 88342 IMHCHEM/IMCYTCHM 1ST ANTB: CPT | Performed by: PATHOLOGY

## 2020-12-07 PROCEDURE — 88175 CYTOPATH C/V AUTO FLUID REDO: CPT | Performed by: PATHOLOGY

## 2020-12-07 PROCEDURE — 58100 BIOPSY OF UTERUS LINING: CPT | Mod: S$PBB,,, | Performed by: OBSTETRICS & GYNECOLOGY

## 2020-12-07 PROCEDURE — 88342 IMHCHEM/IMCYTCHM 1ST ANTB: CPT | Mod: 26,,, | Performed by: PATHOLOGY

## 2020-12-07 PROCEDURE — 87624 HPV HI-RISK TYP POOLED RSLT: CPT

## 2020-12-07 PROCEDURE — 88305 TISSUE EXAM BY PATHOLOGIST: ICD-10-PCS | Mod: 26,,, | Performed by: PATHOLOGY

## 2020-12-07 PROCEDURE — 88341 IMHCHEM/IMCYTCHM EA ADD ANTB: CPT | Performed by: PATHOLOGY

## 2020-12-07 PROCEDURE — 88141 PR  CYTOPATH CERV/VAG INTERPRET: ICD-10-PCS | Mod: ,,, | Performed by: PATHOLOGY

## 2020-12-07 PROCEDURE — 58100 PR BIOPSY OF UTERUS LINING: ICD-10-PCS | Mod: S$PBB,,, | Performed by: OBSTETRICS & GYNECOLOGY

## 2020-12-07 PROCEDURE — 58100 BIOPSY OF UTERUS LINING: CPT | Mod: PBBFAC,PN | Performed by: OBSTETRICS & GYNECOLOGY

## 2020-12-07 PROCEDURE — 88342 CHG IMMUNOCYTOCHEMISTRY: ICD-10-PCS | Mod: 26,,, | Performed by: PATHOLOGY

## 2020-12-07 PROCEDURE — 88305 TISSUE EXAM BY PATHOLOGIST: CPT | Performed by: PATHOLOGY

## 2020-12-07 PROCEDURE — 88341 PR IHC OR ICC EACH ADD'L SINGLE ANTIBODY  STAINPR: ICD-10-PCS | Mod: 26,,, | Performed by: PATHOLOGY

## 2020-12-07 PROCEDURE — 88305 TISSUE EXAM BY PATHOLOGIST: CPT | Mod: 26,,, | Performed by: PATHOLOGY

## 2020-12-07 PROCEDURE — 88141 CYTOPATH C/V INTERPRET: CPT | Mod: ,,, | Performed by: PATHOLOGY

## 2020-12-07 NOTE — PROCEDURES
Procedures   CC: ENDOMETRIAL BIOPSPY    Josey Flores is a 50 y.o. female  presents for an endometrial biopsy secondary to postmenopausal bleeding.  UPT is declined;     Hx of hysteroscopy  for post menopausal bleeding showed atrophic endometrium with path--weekly proliferative  Pap hsil and leep for same, never returned for follow up 2017    PRE-ENDOMETRIAL BIOPSY COUNSELING:    The patient was informed of the risk of bleeding, infection, uterine perforation and pain and that the test will rule-out endometrial cancer with accuracy greater than 95%.  She was counseled on the alternatives to endometrial biopsy and agrees to proceed.      TIME OUT PERFORMED.  Pt reports bleeding started on Saturday; noticed in her underwear; seen at the ER for same--but refused vaginal exam; started on macrobid; reports no bleeding this am; no intercourse for past 1 yr  The cervix was visualized with a speculum.  A single tooth tenaculum was placed on the anterior lip prior to the biopsy.      A sterile endometrial pipelle was passed without difficulty to a depth of 7 cm.    Endometrial tissue was obtained.      The specimen was placed in formalyn and sent to Pathology of histology evaluation.    The patient tolerated the procedure well.      ASSESSMENT AND PLAN  1. Postmenopausal bleeding  Liquid-Based Pap Smear, Screening    HPV High Risk Genotypes, PCR    Specimen to Pathology, Ob/Gyn   2. Malignant neoplasm of overlapping sites of right breast in female, estrogen receptor positive  Mammo Digital Screening Bilat w/ Dipak   3. Screening for cervical cancer  Liquid-Based Pap Smear, Screening    HPV High Risk Genotypes, PCR       POST ENDOMETRIAL BIOPSY COUNSELING:  Manage post biopsy cramping with NSAIDs or Tylenol.  Expect spotting or light bleeding for a few days.  Report bleeding heavier than a period, fever > 101.0 F, worsening pain or a foul smelling vaginal discharge.      Counseling lasted approximately 15  minutes and all her questions were answered.      FOLLOW-UP:  Pending biopsy

## 2020-12-07 NOTE — TELEPHONE ENCOUNTER
Spoke with pt. Assisted pt in scheduling urgent appt today. ELVIE Jameson      ----- Message from Guillermina Bates sent at 12/7/2020 10:48 AM CST -----  Type:  Same Day Appointment Request    Caller is requesting a same day appointment.  Caller declined first available appointment listed below.    Name of Caller:Pt  When is the first available appointment? Unknown   Symptoms:bleeding - pt needs hospital f/u   Best Call Back Number:[phone] ([place])   Additional Information:

## 2020-12-08 LAB — BACTERIA UR CULT: ABNORMAL

## 2020-12-09 ENCOUNTER — SPECIALTY PHARMACY (OUTPATIENT)
Dept: PHARMACY | Facility: CLINIC | Age: 50
End: 2020-12-09

## 2020-12-09 NOTE — TELEPHONE ENCOUNTER
Specialty Pharmacy - Refill Coordination    Specialty Medication Orders Linked to Encounter      Most Recent Value   Medication #1  letrozole (FEMARA) 2.5 mg Tab (Order#[id number], Rx#[id number])          Refill Questions - Documented Responses      Most Recent Value   Relationship to patient of person spoken to?  Self   HIPAA/medical authority confirmed?  Yes   Any changes in contact preferences or allowed representatives?  No   Has the patient had any insurance changes?  No   Has the patient had any changes to specialty medication, dose, or instructions?  No   Has the patient started taking any new medications, herbals, or supplements?  No   Has the patient been diagnosed with any new medical conditions?  No   Does the patient have any new allergies to medications or foods?  No   Does the patient have any concerns about side effects?  No   Can the patient store medication/sharps container properly (at the correct temperature, away from children/pets, etc.)?  Yes   Can the patient call emergency services (911) in the event of an emergency?  Yes   Does the patient have any concerns or questions about taking or administering this medication as prescribed?  No   How many doses did the patient miss in the past 4 weeks or since the last fill?  0   How many doses does the patient have on hand?  7   How many days does the patient report on hand quantity will last?  7   Does the number of doses/days supply remaining match pharmacy expected amounts?  Yes   Does the patient feel that this medication is effective?  Yes   During the past 4 weeks, has patient missed any activities due to condition or medication?  No   During the past 4 weeks, did patient have any of the following urgent care visits?  None   How will the patient receive the medication?  Mail   When does the patient need to receive the medication?  12/16/20   Shipping Address  Home   Address in [facility] confirmed and updated if neccessary?  Yes    Expected Copay ($)  0   Is the patient able to afford the medication copay?  Yes   Payment Method  zero copay   Days supply of Refill  28   Would patient like to speak to a pharmacist?  No   Do you want to trigger an intervention?  No   Do you want to trigger an additional referral task?  No   Refill activity completed?  Yes   Refill activity plan  Refill scheduled   Shipment/Pickup Date:  12/14/20          Current Outpatient Medications   Medication Sig    albuterol (PROVENTIL) 2.5 mg /3 mL (0.083 %) nebulizer solution Take 3 mLs (2.5 mg total) by nebulization every 6 (six) hours while awake.    ALPRAZolam (XANAX) 1 MG tablet Take 0.5-1 tablets (0.5-1 mg total) by mouth 2 (two) times daily as needed for Anxiety.    benzonatate (TESSALON) 200 MG capsule Take 1 capsule (200 mg total) by mouth 3 (three) times daily as needed for Cough.    budesonide (PULMICORT) 0.5 mg/2 mL nebulizer solution Take 2 mLs (0.5 mg total) by nebulization 2 (two) times daily. Wash out mouth after using.    calcium carbonate 400 mg calcium (1,000 mg) Chew Take 2,000 mg by mouth once daily.    cyanocobalamin (VITAMIN B-12) 1000 MCG tablet Take 1 tablet (1,000 mcg total) by mouth once daily.    ergocalciferol (VITAMIN D2) 50,000 unit Cap Take 5,000 Units by mouth once daily at 6am.     EUTHYROX 150 mcg tablet Take 1 tablet by mouth once daily    HYDROcodone-acetaminophen (NORCO)  mg per tablet Take 1 tablet by mouth every 4 (four) hours as needed for Pain.    IBRANCE 125 mg Cap TAKE ONE CAPSULE BY MOUTH ONCE DAILY. SWALLOW WHOLE. DO NOT TAKE IF CAPSULES ARE BROKEN OR CRACKED. AVOID GRAPEFRUIT PRODUCTS    ipratropium (ATROVENT) 42 mcg (0.06 %) nasal spray 2 sprays by Nasal route 4 (four) times daily. As needed for rhinitis. Take in evening before bed and in the morning    letrozole (FEMARA) 2.5 mg Tab Take 1 tablet (2.5 mg total) by mouth once daily.    morphine (MS CONTIN) 15 MG 12 hr tablet Take 1 tablet (15 mg total) by  mouth every 12 (twelve) hours.    multivitamin (THERAGRAN) per tablet Take 1 tablet by mouth once daily.    nitrofurantoin, macrocrystal-monohydrate, (MACROBID) 100 MG capsule Take 1 capsule (100 mg total) by mouth 2 (two) times daily. for 5 days    ondansetron (ZOFRAN) 4 MG tablet Take 1-2 tablets (4-8 mg total) by mouth 2 (two) times daily.    palbociclib (IBRANCE) 125 mg Tab Take 125 mg by mouth once daily. Take 125mg once daily for 21 days, followed by 7 days off, repeat every 28 days    sertraline (ZOLOFT) 25 MG tablet Take 1 tablet (25 mg total) by mouth once daily.   Last reviewed on 12/7/2020  3:13 PM by Patience Carcamo MA    Review of patient's allergies indicates:   Allergen Reactions    Nsaids (non-steroidal anti-inflammatory drug) Other (See Comments)     Instructed not to take NSAID drugs with chemo pill.    Vancomycin analogues Itching    Last reviewed on  12/7/2020 3:13 PM by Patience Carcamo      Tasks added this encounter   No tasks added.   Tasks due within next 3 months   12/9/2020 - Refill Call     Roxanne Merrill  [facility]  [address] [place] [id number]  Phone: [phone]  Fax: [phone]

## 2020-12-10 ENCOUNTER — OFFICE VISIT (OUTPATIENT)
Dept: HEMATOLOGY/ONCOLOGY | Facility: CLINIC | Age: 50
End: 2020-12-10
Payer: MEDICAID

## 2020-12-10 ENCOUNTER — INFUSION (OUTPATIENT)
Dept: INFUSION THERAPY | Facility: HOSPITAL | Age: 50
End: 2020-12-10
Attending: INTERNAL MEDICINE
Payer: MEDICAID

## 2020-12-10 VITALS
BODY MASS INDEX: 33.71 KG/M2 | OXYGEN SATURATION: 97 % | WEIGHT: 214.75 LBS | SYSTOLIC BLOOD PRESSURE: 111 MMHG | DIASTOLIC BLOOD PRESSURE: 77 MMHG | TEMPERATURE: 97 F | HEIGHT: 67 IN | HEART RATE: 91 BPM

## 2020-12-10 VITALS
OXYGEN SATURATION: 96 % | RESPIRATION RATE: 18 BRPM | HEART RATE: 74 BPM | SYSTOLIC BLOOD PRESSURE: 119 MMHG | TEMPERATURE: 98 F | DIASTOLIC BLOOD PRESSURE: 77 MMHG

## 2020-12-10 DIAGNOSIS — C77.3 MALIGNANT NEOPLASM METASTATIC TO LYMPH NODE OF AXILLA: ICD-10-CM

## 2020-12-10 DIAGNOSIS — Z17.0 MALIGNANT NEOPLASM OF OVERLAPPING SITES OF RIGHT BREAST IN FEMALE, ESTROGEN RECEPTOR POSITIVE: Primary | ICD-10-CM

## 2020-12-10 DIAGNOSIS — C50.811 MALIGNANT NEOPLASM OF OVERLAPPING SITES OF RIGHT BREAST IN FEMALE, ESTROGEN RECEPTOR POSITIVE: ICD-10-CM

## 2020-12-10 DIAGNOSIS — C79.51 SECONDARY CANCER OF BONE: Primary | ICD-10-CM

## 2020-12-10 DIAGNOSIS — C73 PAPILLARY THYROID CARCINOMA: ICD-10-CM

## 2020-12-10 DIAGNOSIS — Z17.0 MALIGNANT NEOPLASM OF OVERLAPPING SITES OF RIGHT BREAST IN FEMALE, ESTROGEN RECEPTOR POSITIVE: ICD-10-CM

## 2020-12-10 DIAGNOSIS — C50.811 MALIGNANT NEOPLASM OF OVERLAPPING SITES OF RIGHT BREAST IN FEMALE, ESTROGEN RECEPTOR POSITIVE: Primary | ICD-10-CM

## 2020-12-10 DIAGNOSIS — C79.51 SECONDARY CANCER OF BONE: ICD-10-CM

## 2020-12-10 DIAGNOSIS — J44.9 CHRONIC OBSTRUCTIVE PULMONARY DISEASE, UNSPECIFIED COPD TYPE: ICD-10-CM

## 2020-12-10 PROCEDURE — 99999 PR PBB SHADOW E&M-EST. PATIENT-LVL IV: ICD-10-PCS | Mod: PBBFAC,,, | Performed by: NURSE PRACTITIONER

## 2020-12-10 PROCEDURE — 99999 PR PBB SHADOW E&M-EST. PATIENT-LVL IV: CPT | Mod: PBBFAC,,, | Performed by: NURSE PRACTITIONER

## 2020-12-10 PROCEDURE — 99214 PR OFFICE/OUTPT VISIT, EST, LEVL IV, 30-39 MIN: ICD-10-PCS | Mod: S$PBB,,, | Performed by: NURSE PRACTITIONER

## 2020-12-10 PROCEDURE — 96365 THER/PROPH/DIAG IV INF INIT: CPT

## 2020-12-10 PROCEDURE — 63600175 PHARM REV CODE 636 W HCPCS: Performed by: NURSE PRACTITIONER

## 2020-12-10 PROCEDURE — 99214 OFFICE O/P EST MOD 30 MIN: CPT | Mod: PBBFAC,25 | Performed by: NURSE PRACTITIONER

## 2020-12-10 PROCEDURE — 99214 OFFICE O/P EST MOD 30 MIN: CPT | Mod: S$PBB,,, | Performed by: NURSE PRACTITIONER

## 2020-12-10 RX ORDER — SODIUM CHLORIDE 0.9 % (FLUSH) 0.9 %
10 SYRINGE (ML) INJECTION
Status: CANCELLED | OUTPATIENT
Start: 2020-12-10

## 2020-12-10 RX ORDER — ZOLEDRONIC ACID 0.04 MG/ML
4 INJECTION, SOLUTION INTRAVENOUS
Status: COMPLETED | OUTPATIENT
Start: 2020-12-10 | End: 2020-12-10

## 2020-12-10 RX ORDER — HEPARIN 100 UNIT/ML
500 SYRINGE INTRAVENOUS
Status: CANCELLED | OUTPATIENT
Start: 2020-12-10

## 2020-12-10 RX ADMIN — ZOLEDRONIC ACID 4 MG: 0.04 INJECTION, SOLUTION INTRAVENOUS at 03:12

## 2020-12-14 NOTE — PROGRESS NOTES
Subjective:       Patient ID: Josey Flores is a 50 y.o. female.    Chief Complaint: Chemotherapy    ONCOLOGIC DIAGNOSIS:       stage IV, T2 N1b M1, invasive breast carcinoma, ER/AL+, HER2-     Papillary thyroid carcinoma status post total thyroidectomy on 08/31/2017, mpT1b N0     Cervical HSIL MIREILLE 3 s/p LEEP, 2017     CURRENT TREATMENT: palbociclib and letrozole           ONCOLOGIC HISTORY:       Initial diagnosis after self palpation of mass near right nipple.  Further imaging revealed two lesions in right breast, axillary lymph node.  PET-CT in January 2017 revealed metastatic disease involving 2 lesions in right breast, right axilla, and metastatic disease in manubrium as well as left posterior ilium.  Thyroid was also noted to have avidity.  This was found to be papillary thyroid cancer and she underwent total thyroidectomy.  She notes development of bilateral upper extremity edema and underwent palliative bilateral axillary resection as well as resection of metastatic focus in left ilial region per patient report.      She also has a history of HSIL MIREILLE 3 status post LEEP in 2017.     Today's visit:  She presents today with her partner doing well without acute events.  She has been receiving medications through the mail palbociclib and letrozole taking as prescribed.  She denies any current bony or other pain.      Review of Systems   Constitutional: Positive for fatigue. Negative for appetite change, chills, diaphoresis, fever and unexpected weight change.   HENT: Negative for congestion, hearing loss, mouth sores, postnasal drip, rhinorrhea, sore throat and trouble swallowing.    Eyes: Negative for discharge and visual disturbance.   Respiratory: Negative for cough, chest tightness and shortness of breath.    Cardiovascular: Negative for chest pain, palpitations and leg swelling.   Gastrointestinal: Negative for blood in stool, constipation, diarrhea, nausea and vomiting.   Endocrine: Negative for cold  intolerance and heat intolerance.   Genitourinary: Negative for difficulty urinating, dyspareunia, flank pain and hematuria.   Musculoskeletal: Negative for arthralgias, back pain and myalgias.   Skin: Negative.    Neurological: Negative for dizziness, weakness, light-headedness and headaches.   Hematological: Negative for adenopathy. Does not bruise/bleed easily.   Psychiatric/Behavioral: Negative for agitation, behavioral problems and confusion. The patient is not nervous/anxious.        Medication List with Changes/Refills   Current Medications    ALBUTEROL (PROVENTIL) 2.5 MG /3 ML (0.083 %) NEBULIZER SOLUTION    Take 3 mLs (2.5 mg total) by nebulization every 6 (six) hours while awake.    ALPRAZOLAM (XANAX) 1 MG TABLET    Take 0.5-1 tablets (0.5-1 mg total) by mouth 2 (two) times daily as needed for Anxiety.    BENZONATATE (TESSALON) 200 MG CAPSULE    Take 1 capsule (200 mg total) by mouth 3 (three) times daily as needed for Cough.    BUDESONIDE (PULMICORT) 0.5 MG/2 ML NEBULIZER SOLUTION    Take 2 mLs (0.5 mg total) by nebulization 2 (two) times daily. Wash out mouth after using.    CALCIUM CARBONATE 400 MG CALCIUM (1,000 MG) CHEW    Take 2,000 mg by mouth once daily.    CYANOCOBALAMIN (VITAMIN B-12) 1000 MCG TABLET    Take 1 tablet (1,000 mcg total) by mouth once daily.    ERGOCALCIFEROL (VITAMIN D2) 50,000 UNIT CAP    Take 5,000 Units by mouth once daily at 6am.     EUTHYROX 150 MCG TABLET    Take 1 tablet by mouth once daily    HYDROCODONE-ACETAMINOPHEN (NORCO)  MG PER TABLET    Take 1 tablet by mouth every 4 (four) hours as needed for Pain.    IBRANCE 125 MG CAP    TAKE ONE CAPSULE BY MOUTH ONCE DAILY. SWALLOW WHOLE. DO NOT TAKE IF CAPSULES ARE BROKEN OR CRACKED. AVOID GRAPEFRUIT PRODUCTS    IPRATROPIUM (ATROVENT) 42 MCG (0.06 %) NASAL SPRAY    2 sprays by Nasal route 4 (four) times daily. As needed for rhinitis. Take in evening before bed and in the morning    LETROZOLE (FEMARA) 2.5 MG TAB    Take 1  tablet (2.5 mg total) by mouth once daily.    MORPHINE (MS CONTIN) 15 MG 12 HR TABLET    Take 1 tablet (15 mg total) by mouth every 12 (twelve) hours.    MULTIVITAMIN (THERAGRAN) PER TABLET    Take 1 tablet by mouth once daily.    ONDANSETRON (ZOFRAN) 4 MG TABLET    Take 1-2 tablets (4-8 mg total) by mouth 2 (two) times daily.    PALBOCICLIB (IBRANCE) 125 MG TAB    Take 125 mg by mouth once daily. Take 125mg once daily for 21 days, followed by 7 days off, repeat every 28 days    SERTRALINE (ZOLOFT) 25 MG TABLET    Take 1 tablet (25 mg total) by mouth once daily.     Objective:     Vitals:    12/10/20 1416   BP: 111/77   Pulse: 91   Temp: 97.3 °F (36.3 °C)     Physical Exam  Vitals signs reviewed.   Constitutional:       General: She is not in acute distress.     Appearance: She is well-developed.   HENT:      Head: Normocephalic and atraumatic.      Right Ear: Hearing and external ear normal.      Left Ear: Hearing and external ear normal.      Nose: No rhinorrhea.      Right Sinus: No maxillary sinus tenderness or frontal sinus tenderness.      Left Sinus: No maxillary sinus tenderness or frontal sinus tenderness.      Mouth/Throat:      Mouth: No oral lesions.      Pharynx: Uvula midline.   Eyes:      General:         Right eye: No discharge.         Left eye: No discharge.      Conjunctiva/sclera: Conjunctivae normal.      Pupils: Pupils are equal, round, and reactive to light.   Neck:      Musculoskeletal: Normal range of motion.      Thyroid: No thyromegaly.      Vascular: No carotid bruit.      Trachea: No tracheal deviation.   Cardiovascular:      Rate and Rhythm: Normal rate and regular rhythm.      Pulses:           Dorsalis pedis pulses are 2+ on the right side and 2+ on the left side.      Heart sounds: Normal heart sounds, S1 normal and S2 normal. No murmur.   Pulmonary:      Effort: Pulmonary effort is normal. No respiratory distress.      Breath sounds: Normal breath sounds.   Abdominal:      General:  Bowel sounds are normal. There is no distension.      Palpations: Abdomen is soft. There is no mass.      Tenderness: There is no abdominal tenderness.   Musculoskeletal: Normal range of motion.   Lymphadenopathy:      Cervical: No cervical adenopathy.      Upper Body:      Right upper body: No supraclavicular adenopathy.      Left upper body: No supraclavicular adenopathy.   Skin:     General: Skin is warm and dry.      Capillary Refill: Capillary refill takes less than 2 seconds.      Findings: No rash.   Neurological:      Mental Status: She is alert and oriented to person, place, and time.      Sensory: No sensory deficit.      Coordination: Coordination normal.      Gait: Gait normal.   Psychiatric:         Mood and Affect: Mood is not anxious or depressed.         Speech: Speech normal.         Behavior: Behavior normal.         Thought Content: Thought content normal.         Judgment: Judgment normal.         Assessment:       Problem List Items Addressed This Visit        Pulmonary    Chronic obstructive pulmonary disease       Oncology    Malignant neoplasm of overlapping sites of right breast in female, estrogen receptor positive - Primary    Malignant neoplasm metastatic to lymph node of axilla    Secondary cancer of bone    Papillary thyroid carcinoma          Plan:       Breast cancer, thyroid carcinoma, HSIL of cervix:  --Genetic Testing denied by insurance  --Reviewed labs with patient  --Continue current regimen  --Encouraged patient to complete needed dental work to begin Zometa    Follow-Up:  Return to clinic in 4 weeks with labs prior to visit. Patient to see MD.

## 2020-12-14 NOTE — PATIENT INSTRUCTIONS
COVID-19 and Cancer Patients    What is COVID-19?  COVID-19 is a new type of virus that can cause mild to severe infections in the lungs. Like other viruses, it can lead to serious infections for people with weakened immune systems. COVID-19 may cause more severe infections than other viruses. We do not have a vaccine to help control its spread, but experts are working to make a vaccine.    How does COVID-19 spread?  The virus can spread easily, just like the common cold or flu. It spreads when an infected person coughs or sneezes droplets that can get into the eyes, nose, or mouth of people nearby. Droplets also land on surfaces that people touch before touching their own eyes, nose, or mouth.    How can I protect myself?  These are some of the best ways to protect yourself and others from the virus:  - Wash your hands often with soap and water for at least 20 seconds.  - Use hand  with 60% or more alcohol until you can wash your hands with soap and water.  - Avoid touching your eyes, nose, and mouth without washing your hands first.  - Clean and disinfect surfaces often. Regular household wipes and sprays will kill the virus. Be sure to  clean places that people touch a lot, such as door handles, phones, keyboards, and light switches.  - Avoid handshakes, hugging, and standing or sitting close to people who are coughing or sneezing.  - Be as healthy as you can. Get plenty of sleep, eat healthy, exercise, and manage your stress.  If you are sick, follow these steps:  - Stay home.  - Cover your nose and mouth when you cough or sneeze. If you use a tissue, put it in the garbage right  away. If you do not have a tissue, cough or sneeze into your elbow crease.  - Call before going to your medical appointments. Let them know about recent travel or if you have had  contact with a person with COVID-19.          As of 3/12/2020  Should I wear a mask?  Experts don't believe that wearing a mask is helpful for the  general public, unless there is concern you have been exposed and may not have symptoms. Some people should use certain types of masks because of their own health or the type of work they do. Talk to your doctor or nurse to see if you would benefit from wearing a mask. If you arrive at the hospital with respiratory symptoms, please ask for a mask.    What if I care for or live with a cancer patient?  If you are caring for or living with someone with cancer, do your best to keep them from getting the virus.  Follow the steps to protect yourself listed on this sheet.  If you become sick yourself, call your doctor to see what more you should do to protect your loved one.    What about people visiting?   If you are sick or have been exposed to COVID-19, we ask that you not come to [facility].    If patients have symptoms, call the [facility] Covid-19 Line ([phone])    We ask that you find someone who isn't sick to join your loved one at their appointments.  To protect all patients, we may limit the number of people who can visit someone staying in the hospital.  Please check with your care team.    How will [facility] protect me from getting COVID-19?  Our hospital and clinics are taking steps to keep infected patients separate from those who may be at risk. At every appointment, your care team will ask questions about overall health and recent travel. We may ask some patients to wait in a separate room or to reschedule until they are feeling better if they have symptoms.  We are also taking extra steps to clean and disinfect surfaces throughout our hospital and clinics.     Will you still care for me if I get sick?  Yes. Your care is our top priority. Although we may change some ways we care for you, we will never put your care or health at risk.            CALL BEFORE YOU ACT   Dial 211 or Text keyword LACOVID to [phone] for general questions and information.   If patients have  symptoms, call the [facility] Covid-19 Line ([phone])               [facility] ([email or web address])    [email or web address]

## 2020-12-15 ENCOUNTER — HOSPITAL ENCOUNTER (OUTPATIENT)
Dept: RADIOLOGY | Facility: HOSPITAL | Age: 50
Discharge: HOME | End: 2020-12-15
Attending: OBSTETRICS & GYNECOLOGY
Payer: MEDICAID

## 2020-12-15 ENCOUNTER — TELEPHONE (OUTPATIENT)
Dept: OBSTETRICS AND GYNECOLOGY | Facility: CLINIC | Age: 50
End: 2020-12-15

## 2020-12-15 VITALS — HEIGHT: 67 IN | WEIGHT: 214.06 LBS | BODY MASS INDEX: 33.6 KG/M2

## 2020-12-15 DIAGNOSIS — C50.811 MALIGNANT NEOPLASM OF OVERLAPPING SITES OF RIGHT BREAST IN FEMALE, ESTROGEN RECEPTOR POSITIVE: ICD-10-CM

## 2020-12-15 DIAGNOSIS — Z17.0 MALIGNANT NEOPLASM OF OVERLAPPING SITES OF RIGHT BREAST IN FEMALE, ESTROGEN RECEPTOR POSITIVE: ICD-10-CM

## 2020-12-15 LAB
HPV HR 12 DNA SPEC QL NAA+PROBE: NEGATIVE
HPV16 AG SPEC QL: POSITIVE
HPV18 DNA SPEC QL NAA+PROBE: NEGATIVE

## 2020-12-15 PROCEDURE — 77066 MAMMO DIGITAL DIAGNOSTIC BILAT WITH TOMO: ICD-10-PCS | Mod: 26,,, | Performed by: RADIOLOGY

## 2020-12-15 PROCEDURE — 77062 MAMMO DIGITAL DIAGNOSTIC BILAT WITH TOMO: ICD-10-PCS | Mod: 26,,, | Performed by: RADIOLOGY

## 2020-12-15 PROCEDURE — 77062 BREAST TOMOSYNTHESIS BI: CPT | Mod: 26,,, | Performed by: RADIOLOGY

## 2020-12-15 PROCEDURE — 77066 DX MAMMO INCL CAD BI: CPT | Mod: 26,,, | Performed by: RADIOLOGY

## 2020-12-15 PROCEDURE — 77066 DX MAMMO INCL CAD BI: CPT | Mod: TC

## 2020-12-16 ENCOUNTER — HOSPITAL ENCOUNTER (OUTPATIENT)
Dept: RADIOLOGY | Facility: HOSPITAL | Age: 50
Discharge: HOME | End: 2020-12-16
Attending: OBSTETRICS & GYNECOLOGY
Payer: MEDICAID

## 2020-12-16 DIAGNOSIS — C50.919 MALIGNANT NEOPLASM OF BREAST: ICD-10-CM

## 2020-12-16 PROCEDURE — 76642 US BREAST RIGHT LIMITED: ICD-10-PCS | Mod: 26,RT,, | Performed by: RADIOLOGY

## 2020-12-16 PROCEDURE — 76642 ULTRASOUND BREAST LIMITED: CPT | Mod: TC,RT

## 2020-12-16 PROCEDURE — 76642 ULTRASOUND BREAST LIMITED: CPT | Mod: 26,RT,, | Performed by: RADIOLOGY

## 2020-12-19 DIAGNOSIS — G89.3 CANCER ASSOCIATED PAIN: ICD-10-CM

## 2020-12-21 RX ORDER — HYDROCODONE BITARTRATE AND ACETAMINOPHEN 10; 325 MG/1; MG/1
1 TABLET ORAL EVERY 4 HOURS PRN
Qty: 120 TABLET | Refills: 0 | Status: SHIPPED | OUTPATIENT
Start: 2020-12-21 | End: 2021-01-08 | Stop reason: SDUPTHER

## 2020-12-21 NOTE — TELEPHONE ENCOUNTER
SW contacted pt to discuss DS of 4. Pt stated that she didn't have any immediate needs at this time. SW inquired on boost from [employer]. Pt explained that she is currently taking ibrance for treatment and the boost increased her weight gain so she was instructed to not take any more boost. DAYANNA briefly listed social work services from the checklist and informed pt that she would send the list through patient portal for further review by the patient.Pt thanked DAYANNA for the contact card.      F/u as needs arise.   no

## 2020-12-22 DIAGNOSIS — N95.0 POSTMENOPAUSAL BLEEDING: Primary | ICD-10-CM

## 2020-12-22 DIAGNOSIS — D06.9 SQUAMOUS CELL CARCINOMA IN SITU (SCCIS) OF CERVIX: ICD-10-CM

## 2020-12-22 LAB
COMMENT: ABNORMAL
FINAL PATHOLOGIC DIAGNOSIS: ABNORMAL
GROSS: ABNORMAL

## 2020-12-23 ENCOUNTER — TELEPHONE (OUTPATIENT)
Dept: GYNECOLOGIC ONCOLOGY | Facility: CLINIC | Age: 50
End: 2020-12-23

## 2020-12-23 NOTE — TELEPHONE ENCOUNTER
Spoke with our patient verify that she has [employer] insurance and  Schedule her  appointment in gyn oncology she agreed she voiced understanding of the date, time and location. All questions answered appointment mail. MA/EDWIN /Preceptor Vladimir GUZMAN

## 2020-12-23 NOTE — TELEPHONE ENCOUNTER
----- Message from Yousif Desouza MD sent at 12/22/2020  4:22 PM CST -----  Vladimir, can we please schedule this patient for next Wednesday or Thursday?  Thanks.   ----- Message -----  From: Patience Carcamo MA  Sent: 12/22/2020   4:18 PM CST  To: Marge Marroquin MD, Yousif Desouza MD    Pt needs appt with gyn oncology. ELVIE Jameson

## 2020-12-28 DIAGNOSIS — G89.3 CANCER ASSOCIATED PAIN: ICD-10-CM

## 2020-12-28 DIAGNOSIS — F41.9 ANXIETY: ICD-10-CM

## 2020-12-29 RX ORDER — ALPRAZOLAM 1 MG/1
.5-1 TABLET ORAL 2 TIMES DAILY PRN
Qty: 60 TABLET | Refills: 0 | Status: SHIPPED | OUTPATIENT
Start: 2020-12-29 | End: 2021-01-25 | Stop reason: SDUPTHER

## 2020-12-29 RX ORDER — MORPHINE SULFATE 15 MG/1
15 TABLET, FILM COATED, EXTENDED RELEASE ORAL EVERY 12 HOURS
Qty: 60 TABLET | Refills: 0 | Status: SHIPPED | OUTPATIENT
Start: 2020-12-29 | End: 2021-01-25 | Stop reason: SDUPTHER

## 2020-12-31 ENCOUNTER — INITIAL CONSULT (OUTPATIENT)
Dept: GYNECOLOGIC ONCOLOGY | Facility: CLINIC | Age: 50
End: 2020-12-31
Payer: MEDICAID

## 2020-12-31 VITALS
WEIGHT: 213.88 LBS | SYSTOLIC BLOOD PRESSURE: 112 MMHG | DIASTOLIC BLOOD PRESSURE: 74 MMHG | BODY MASS INDEX: 33.49 KG/M2 | HEART RATE: 89 BPM

## 2020-12-31 DIAGNOSIS — C50.811 MALIGNANT NEOPLASM OF OVERLAPPING SITES OF RIGHT BREAST IN FEMALE, ESTROGEN RECEPTOR POSITIVE: ICD-10-CM

## 2020-12-31 DIAGNOSIS — C77.3 MALIGNANT NEOPLASM METASTATIC TO LYMPH NODE OF AXILLA: ICD-10-CM

## 2020-12-31 DIAGNOSIS — C73 PAPILLARY THYROID CARCINOMA: ICD-10-CM

## 2020-12-31 DIAGNOSIS — N95.0 POSTMENOPAUSAL BLEEDING: ICD-10-CM

## 2020-12-31 DIAGNOSIS — Z72.0 TOBACCO ABUSE: ICD-10-CM

## 2020-12-31 DIAGNOSIS — Z17.0 MALIGNANT NEOPLASM OF OVERLAPPING SITES OF RIGHT BREAST IN FEMALE, ESTROGEN RECEPTOR POSITIVE: ICD-10-CM

## 2020-12-31 DIAGNOSIS — C79.51 SECONDARY CANCER OF BONE: ICD-10-CM

## 2020-12-31 DIAGNOSIS — J44.9 CHRONIC OBSTRUCTIVE PULMONARY DISEASE, UNSPECIFIED COPD TYPE: ICD-10-CM

## 2020-12-31 DIAGNOSIS — E66.9 OBESITY (BMI 30-39.9): ICD-10-CM

## 2020-12-31 DIAGNOSIS — F41.9 ANXIETY: ICD-10-CM

## 2020-12-31 DIAGNOSIS — C53.9 MALIGNANT NEOPLASM OF CERVIX, UNSPECIFIED SITE: Primary | ICD-10-CM

## 2020-12-31 PROCEDURE — 99205 PR OFFICE/OUTPT VISIT, NEW, LEVL V, 60-74 MIN: ICD-10-PCS | Mod: S$PBB,,, | Performed by: OBSTETRICS & GYNECOLOGY

## 2020-12-31 PROCEDURE — 99205 OFFICE O/P NEW HI 60 MIN: CPT | Mod: S$PBB,,, | Performed by: OBSTETRICS & GYNECOLOGY

## 2020-12-31 PROCEDURE — 99999 PR PBB SHADOW E&M-EST. PATIENT-LVL IV: ICD-10-PCS | Mod: PBBFAC,,, | Performed by: OBSTETRICS & GYNECOLOGY

## 2020-12-31 PROCEDURE — 99214 OFFICE O/P EST MOD 30 MIN: CPT | Mod: PBBFAC | Performed by: OBSTETRICS & GYNECOLOGY

## 2020-12-31 PROCEDURE — 99999 PR PBB SHADOW E&M-EST. PATIENT-LVL IV: CPT | Mod: PBBFAC,,, | Performed by: OBSTETRICS & GYNECOLOGY

## 2020-12-31 NOTE — LETTER
December 31, 2020      Erika Chan MD  [address] [facility] [address]           [facility] [address]  [address], [address]  [facility] [address]  Phone: [phone]  Fax: [phone]          Patient: oJsey Flores   MR Number: [id number]   YOB: 1970   Date of Visit: 12/31/2020       Dear Dr. Erika Chan:    Thank you for referring Josey Flores to me for evaluation. Attached you will find relevant portions of my assessment and plan of care.    If you have questions, please do not hesitate to call me. I look forward to following Josey Flores along with you.    Sincerely,    Yousif Desouza MD    Enclosure  CC:  No Recipients    If you would like to receive this communication electronically, please contact [email or web address] or [phone] to request more information on [employer] access.    For providers and/or their staff who would like to refer a patient to [facility], please contact us through our one-stop-shop provider referral line, [facility], at [phone].    If you feel you have received this communication in error or would no longer like to receive these types of communications, please e-mail [email or web address]

## 2020-12-31 NOTE — PROGRESS NOTES
REFERRING PROVIDER  Erika Chan MD     HISTORY OF PRESENT CONDITION  CC: cervical cancer   Josey Flores is a 50 y.o.  who presents in consultation at for an opinion regarding a grade ? squamous cell cervical cancer.    Patient reports intermittent vaginal bleeding.  She characterizes it as spotting.  She denies any foul-smelling vaginal discharge, changes in her stool or urine, or lower extremity erythema or edema.  She tolerates p.o. without any nausea and vomiting.  She has regular bowel movements.  She reports pelvic pain localized in the middle that is characterizes cramping.  She is unsure of duration.  It has progressively worsened.  It is nonradiating.  She denies any alleviating or aggravating factors.  She denies a history of colonoscopy.    Dysplasia history:  1. 16: Hysteroscopy, D&C: negative  2. 16: Cervical biopsy at 12 o'clock, ECC: MIREILLE III  3. 3/29/17: LEEP: MIREILLE III with + endocervical margins  4. 17: Pap: NIL  5. 9/10/20: CT A/P: negative for ascites, omental caking, lymphadenopathy, or a gross cervical mass  6. 29: CXR: negative  7. 20: EMBX: squamous cell carcinoma that is p16+         Cr 1.2, Hb 13.8     Breast tumor history: Stage IV ER/WA+, HER2- IDC with metastasis to the manubrium and left posterior ilum (now on palbociclib and LZ).  Per a discussion with [facility], her prognosis is favorable.    Thyroid tumor history: Stage 1b papillary thyroid cancer.  Per a discussion with [facility], she is cured.    REVIEW OF SYSTEMS  All systems reviewed and negative except as noted in HPI.    OBJECTIVE   Vitals:    20 0907   BP: 112/74   Pulse: 89      Body mass index is 33.49 kg/m².      1. General: Well appearing, no apparent distress, alert and oriented.  2. Lymph: Neck symmetric without cervical or supraclavicular adenopathy or mass.  3. Lungs: Normal respiratory rate, no accessory muscle use.  4. Psych: Normal affect.  5. Abdomen:  non-distended, soft,  non-tender, are no masses, no ascites, no hepatosplenomegaly; appendectomy incision in the right lower quadrant  6. Skin: Warm, dry, no rashes or lesions.   7. Extremities: Bilateral lower extremities without edema or tenderness.  8. Genitourinary               Pelvic Examination including:                a. External genitalia are normal in appearance. No lesions noted.  No inguinal lymphadenopathy.              b. Urethral meatus is normal size, location, and appearance.               c. Urethra is negative.               d. Bladder is nontender. No masses noted.               e. Vagina has normal mucosa with physiologic discharge.               f. Cervix anterior and flush to the vagina.  No visible lesions.  3 cm in greatest dimension.  There is more anterior than posterior cervix.  Minimum nodularity noted on the cervix at 6:00.  Normal parametria.   g. Uterus normal size and mobile              h. Adnexa normal   i. Rectum normal   j. Parametrium normal    ECOG status: 1    LABORATORY DATA  Lab data reviewed.    RADIOLOGICAL DATA  Radiology data reviewed.    PATHOLOGY DATA  Pathology data reviewed.    ASSESSMENT / PLAN     1. Malignant neoplasm of cervix, unspecified site    2. Postmenopausal bleeding    3. Tobacco abuse    4. Obesity (BMI 30-39.9)    5. Papillary thyroid carcinoma    6. Malignant neoplasm of overlapping sites of right breast in female, estrogen receptor positive    7. Malignant neoplasm metastatic to lymph node of axilla    8. Secondary cancer of bone    9. Chronic obstructive pulmonary disease, unspecified COPD type    10. Anxiety       12/7/20: EMBX: squamous cell carcinoma that is p16+         Cr 1.2, Hb 13.8     F/U MRI Pelvis w/ w/o contrast.      The case was discussed with pathology.  The findings favor primary cervical cancer.  This is most consistent with the clinical picture is the patient has a history of MIREILLE 3 with positive margins.  She also smokes 2 packs of cigarettes per day.   I think the endometrial biopsy is most likely contamination from the cervix.  Patient has a very flushed cervix and I do not think a cold knife conization is feasible.  We will begin with an MRI pelvis with and without contrast to determine if there is a macroscopic tumor in the cervix.  Her cervix is less than 4 cm and she would therefore be a candidate for a radical hysterectomy with sentinel lymph node biopsies.  We will proceed with the exam under anesthesia, cervical biopsies, and hysteroscopy with dilation and curettage that the MRI is inconclusive.  The patient voiced understanding.  All questions were answered.    PATIENT EDUCATION  Ready to learn, no apparent learning barriers were identified; learning preferences include listening.  Explained diagnosis and treatment plan; patient expressed understanding of the content.    INFORMED CONSENT  Discussed the risks, benefits, and alternatives of the procedure and of possible blood transfusion.  Discussed the necessity of other members of the healthcare team participating in the procedure.  All questions answered and consent given.    Yousif Desouza

## 2020-12-31 NOTE — Clinical Note
Thanks for sending her.  Will start off with an MRI to see if there is a macroscopic tumor.  [facility] thinks her breast cancer and thyroid cancer or under control and cured, respectively, so we should be able to treat her once we determine what type of hysterectomy she needs.  Happy new year.

## 2021-01-06 ENCOUNTER — SPECIALTY PHARMACY (OUTPATIENT)
Dept: PHARMACY | Facility: CLINIC | Age: 51
End: 2021-01-06

## 2021-01-08 ENCOUNTER — SPECIALTY PHARMACY (OUTPATIENT)
Dept: PHARMACY | Facility: CLINIC | Age: 51
End: 2021-01-08

## 2021-01-08 DIAGNOSIS — C77.3 MALIGNANT NEOPLASM METASTATIC TO LYMPH NODE OF AXILLA: ICD-10-CM

## 2021-01-08 DIAGNOSIS — Z17.0 MALIGNANT NEOPLASM OF OVERLAPPING SITES OF RIGHT BREAST IN FEMALE, ESTROGEN RECEPTOR POSITIVE: ICD-10-CM

## 2021-01-08 DIAGNOSIS — G89.3 NEOPLASM RELATED PAIN: ICD-10-CM

## 2021-01-08 DIAGNOSIS — C50.811 MALIGNANT NEOPLASM OF OVERLAPPING SITES OF RIGHT BREAST IN FEMALE, ESTROGEN RECEPTOR POSITIVE: ICD-10-CM

## 2021-01-08 DIAGNOSIS — G89.3 CANCER ASSOCIATED PAIN: ICD-10-CM

## 2021-01-08 DIAGNOSIS — C79.51 SECONDARY CANCER OF BONE: ICD-10-CM

## 2021-01-11 ENCOUNTER — SPECIALTY PHARMACY (OUTPATIENT)
Dept: PHARMACY | Facility: CLINIC | Age: 51
End: 2021-01-11

## 2021-01-11 ENCOUNTER — TELEPHONE (OUTPATIENT)
Dept: HEMATOLOGY/ONCOLOGY | Facility: CLINIC | Age: 51
End: 2021-01-11

## 2021-01-11 DIAGNOSIS — C50.811 MALIGNANT NEOPLASM OF OVERLAPPING SITES OF RIGHT BREAST IN FEMALE, ESTROGEN RECEPTOR POSITIVE: ICD-10-CM

## 2021-01-11 DIAGNOSIS — Z17.0 MALIGNANT NEOPLASM OF OVERLAPPING SITES OF RIGHT BREAST IN FEMALE, ESTROGEN RECEPTOR POSITIVE: ICD-10-CM

## 2021-01-11 RX ORDER — HYDROCODONE BITARTRATE AND ACETAMINOPHEN 10; 325 MG/1; MG/1
1 TABLET ORAL EVERY 4 HOURS PRN
Qty: 120 TABLET | Refills: 0 | Status: ON HOLD | OUTPATIENT
Start: 2021-01-11 | End: 2021-01-28 | Stop reason: SDUPTHER

## 2021-01-12 ENCOUNTER — TELEPHONE (OUTPATIENT)
Dept: GYNECOLOGIC ONCOLOGY | Facility: CLINIC | Age: 51
End: 2021-01-12

## 2021-01-12 ENCOUNTER — HOSPITAL ENCOUNTER (OUTPATIENT)
Dept: RADIOLOGY | Facility: HOSPITAL | Age: 51
Discharge: HOME | End: 2021-01-12
Attending: OBSTETRICS & GYNECOLOGY
Payer: MEDICAID

## 2021-01-12 ENCOUNTER — PATIENT MESSAGE (OUTPATIENT)
Dept: GYNECOLOGIC ONCOLOGY | Facility: CLINIC | Age: 51
End: 2021-01-12

## 2021-01-12 DIAGNOSIS — C53.9 MALIGNANT NEOPLASM OF CERVIX, UNSPECIFIED SITE: Primary | ICD-10-CM

## 2021-01-12 DIAGNOSIS — C53.9 MALIGNANT NEOPLASM OF CERVIX, UNSPECIFIED SITE: ICD-10-CM

## 2021-01-12 PROCEDURE — 25500020 PHARM REV CODE 255: Performed by: OBSTETRICS & GYNECOLOGY

## 2021-01-12 PROCEDURE — A9585 GADOBUTROL INJECTION: HCPCS | Performed by: OBSTETRICS & GYNECOLOGY

## 2021-01-12 PROCEDURE — 72197 MRI PELVIS W/O & W/DYE: CPT | Mod: TC

## 2021-01-12 RX ORDER — GADOBUTROL 604.72 MG/ML
10 INJECTION INTRAVENOUS
Status: COMPLETED | OUTPATIENT
Start: 2021-01-12 | End: 2021-01-12

## 2021-01-12 RX ADMIN — GADOBUTROL 9 ML: 604.72 INJECTION INTRAVENOUS at 10:01

## 2021-01-13 ENCOUNTER — TELEPHONE (OUTPATIENT)
Dept: GYNECOLOGIC ONCOLOGY | Facility: CLINIC | Age: 51
End: 2021-01-13

## 2021-01-13 ENCOUNTER — OFFICE VISIT (OUTPATIENT)
Dept: GYNECOLOGIC ONCOLOGY | Facility: CLINIC | Age: 51
End: 2021-01-13
Payer: MEDICAID

## 2021-01-13 ENCOUNTER — TELEPHONE (OUTPATIENT)
Dept: HEMATOLOGY/ONCOLOGY | Facility: HOSPITAL | Age: 51
End: 2021-01-13

## 2021-01-13 DIAGNOSIS — G89.3 NEOPLASM RELATED PAIN: ICD-10-CM

## 2021-01-13 DIAGNOSIS — Z17.0 MALIGNANT NEOPLASM OF OVERLAPPING SITES OF RIGHT BREAST IN FEMALE, ESTROGEN RECEPTOR POSITIVE: ICD-10-CM

## 2021-01-13 DIAGNOSIS — C50.811 MALIGNANT NEOPLASM OF OVERLAPPING SITES OF RIGHT BREAST IN FEMALE, ESTROGEN RECEPTOR POSITIVE: ICD-10-CM

## 2021-01-13 DIAGNOSIS — J44.9 CHRONIC OBSTRUCTIVE PULMONARY DISEASE, UNSPECIFIED COPD TYPE: ICD-10-CM

## 2021-01-13 DIAGNOSIS — C77.3 MALIGNANT NEOPLASM METASTATIC TO LYMPH NODE OF AXILLA: ICD-10-CM

## 2021-01-13 DIAGNOSIS — C53.0 MALIGNANT NEOPLASM OF ENDOCERVIX: Primary | ICD-10-CM

## 2021-01-13 DIAGNOSIS — E66.9 OBESITY (BMI 30-39.9): ICD-10-CM

## 2021-01-13 DIAGNOSIS — C73 PAPILLARY THYROID CARCINOMA: ICD-10-CM

## 2021-01-13 DIAGNOSIS — C79.51 SECONDARY CANCER OF BONE: ICD-10-CM

## 2021-01-13 PROCEDURE — 99215 OFFICE O/P EST HI 40 MIN: CPT | Mod: 95,,, | Performed by: OBSTETRICS & GYNECOLOGY

## 2021-01-13 PROCEDURE — 99215 PR OFFICE/OUTPT VISIT, EST, LEVL V, 40-54 MIN: ICD-10-PCS | Mod: 95,,, | Performed by: OBSTETRICS & GYNECOLOGY

## 2021-01-14 ENCOUNTER — TELEPHONE (OUTPATIENT)
Dept: GYNECOLOGIC ONCOLOGY | Facility: CLINIC | Age: 51
End: 2021-01-14

## 2021-01-14 ENCOUNTER — NURSE TRIAGE (OUTPATIENT)
Dept: ADMINISTRATIVE | Facility: CLINIC | Age: 51
End: 2021-01-14

## 2021-01-14 PROBLEM — C55 MALIGNANT NEOPLASM OF UTERUS, PART UNSPECIFIED: Status: RESOLVED | Noted: 2021-01-14 | Resolved: 2021-01-14

## 2021-01-14 PROBLEM — C53.0 MALIGNANT NEOPLASM OF ENDOCERVIX: Status: ACTIVE | Noted: 2021-01-14

## 2021-01-14 PROBLEM — C55 MALIGNANT NEOPLASM OF UTERUS, PART UNSPECIFIED: Status: ACTIVE | Noted: 2021-01-14

## 2021-01-20 ENCOUNTER — OFFICE VISIT (OUTPATIENT)
Dept: HEMATOLOGY/ONCOLOGY | Facility: CLINIC | Age: 51
End: 2021-01-20
Payer: MEDICAID

## 2021-01-20 VITALS
OXYGEN SATURATION: 97 % | SYSTOLIC BLOOD PRESSURE: 133 MMHG | HEART RATE: 83 BPM | DIASTOLIC BLOOD PRESSURE: 88 MMHG | WEIGHT: 213.88 LBS | HEIGHT: 67 IN | BODY MASS INDEX: 33.57 KG/M2 | TEMPERATURE: 98 F

## 2021-01-20 DIAGNOSIS — C79.51 SECONDARY CANCER OF BONE: ICD-10-CM

## 2021-01-20 DIAGNOSIS — C53.0 MALIGNANT NEOPLASM OF ENDOCERVIX: Primary | ICD-10-CM

## 2021-01-20 DIAGNOSIS — Z17.0 MALIGNANT NEOPLASM OF OVERLAPPING SITES OF RIGHT BREAST IN FEMALE, ESTROGEN RECEPTOR POSITIVE: ICD-10-CM

## 2021-01-20 DIAGNOSIS — C73 PAPILLARY THYROID CARCINOMA: ICD-10-CM

## 2021-01-20 DIAGNOSIS — C50.811 MALIGNANT NEOPLASM OF OVERLAPPING SITES OF RIGHT BREAST IN FEMALE, ESTROGEN RECEPTOR POSITIVE: ICD-10-CM

## 2021-01-20 LAB
FINAL PATHOLOGIC DIAGNOSIS: ABNORMAL
Lab: ABNORMAL

## 2021-01-20 PROCEDURE — 99999 PR PBB SHADOW E&M-EST. PATIENT-LVL IV: CPT | Mod: PBBFAC,,, | Performed by: INTERNAL MEDICINE

## 2021-01-20 PROCEDURE — 99214 OFFICE O/P EST MOD 30 MIN: CPT | Mod: PBBFAC | Performed by: INTERNAL MEDICINE

## 2021-01-20 PROCEDURE — 99215 PR OFFICE/OUTPT VISIT, EST, LEVL V, 40-54 MIN: ICD-10-PCS | Mod: S$PBB,,, | Performed by: INTERNAL MEDICINE

## 2021-01-20 PROCEDURE — 99215 OFFICE O/P EST HI 40 MIN: CPT | Mod: S$PBB,,, | Performed by: INTERNAL MEDICINE

## 2021-01-20 PROCEDURE — 99999 PR PBB SHADOW E&M-EST. PATIENT-LVL IV: ICD-10-PCS | Mod: PBBFAC,,, | Performed by: INTERNAL MEDICINE

## 2021-01-21 ENCOUNTER — DOCUMENTATION ONLY (OUTPATIENT)
Dept: HEMATOLOGY/ONCOLOGY | Facility: CLINIC | Age: 51
End: 2021-01-21

## 2021-01-21 DIAGNOSIS — C50.919 MALIGNANT NEOPLASM OF FEMALE BREAST, UNSPECIFIED ESTROGEN RECEPTOR STATUS, UNSPECIFIED LATERALITY, UNSPECIFIED SITE OF BREAST: ICD-10-CM

## 2021-01-21 DIAGNOSIS — C53.9 MALIGNANT NEOPLASM OF CERVIX, UNSPECIFIED SITE: Primary | ICD-10-CM

## 2021-01-22 ENCOUNTER — DOCUMENTATION ONLY (OUTPATIENT)
Dept: HEMATOLOGY/ONCOLOGY | Facility: CLINIC | Age: 51
End: 2021-01-22

## 2021-01-22 DIAGNOSIS — Z20.822 ENCOUNTER FOR LABORATORY TESTING FOR COVID-19 VIRUS: ICD-10-CM

## 2021-01-25 ENCOUNTER — LAB VISIT (OUTPATIENT)
Dept: OTOLARYNGOLOGY | Facility: CLINIC | Age: 51
End: 2021-01-25
Payer: MEDICAID

## 2021-01-25 ENCOUNTER — TELEPHONE (OUTPATIENT)
Dept: RADIOLOGY | Facility: HOSPITAL | Age: 51
End: 2021-01-25

## 2021-01-25 DIAGNOSIS — G89.3 CANCER ASSOCIATED PAIN: ICD-10-CM

## 2021-01-25 DIAGNOSIS — Z20.822 ENCOUNTER FOR LABORATORY TESTING FOR COVID-19 VIRUS: ICD-10-CM

## 2021-01-25 DIAGNOSIS — F41.9 ANXIETY: ICD-10-CM

## 2021-01-25 PROCEDURE — U0003 INFECTIOUS AGENT DETECTION BY NUCLEIC ACID (DNA OR RNA); SEVERE ACUTE RESPIRATORY SYNDROME CORONAVIRUS 2 (SARS-COV-2) (CORONAVIRUS DISEASE [COVID-19]), AMPLIFIED PROBE TECHNIQUE, MAKING USE OF HIGH THROUGHPUT TECHNOLOGIES AS DESCRIBED BY CMS-2020-01-R: HCPCS

## 2021-01-25 RX ORDER — LEVOTHYROXINE SODIUM 150 UG/1
150 TABLET ORAL DAILY
Qty: 30 TABLET | Refills: 0 | Status: ON HOLD | OUTPATIENT
Start: 2021-01-25 | End: 2021-05-18 | Stop reason: SDUPTHER

## 2021-01-26 LAB — SARS-COV-2 RNA RESP QL NAA+PROBE: NOT DETECTED

## 2021-01-26 RX ORDER — ALPRAZOLAM 1 MG/1
.5-1 TABLET ORAL 2 TIMES DAILY PRN
Qty: 60 TABLET | Refills: 0 | Status: SHIPPED | OUTPATIENT
Start: 2021-01-26 | End: 2021-02-23 | Stop reason: SDUPTHER

## 2021-01-26 RX ORDER — MORPHINE SULFATE 15 MG/1
15 TABLET, FILM COATED, EXTENDED RELEASE ORAL EVERY 12 HOURS
Qty: 60 TABLET | Refills: 0 | Status: SHIPPED | OUTPATIENT
Start: 2021-01-26 | End: 2021-02-23 | Stop reason: SDUPTHER

## 2021-01-27 ENCOUNTER — HOSPITAL ENCOUNTER (OUTPATIENT)
Dept: RADIOLOGY | Facility: HOSPITAL | Age: 51
Discharge: HOME | End: 2021-01-27
Attending: OBSTETRICS & GYNECOLOGY
Payer: MEDICAID

## 2021-01-27 ENCOUNTER — PATIENT MESSAGE (OUTPATIENT)
Dept: GYNECOLOGIC ONCOLOGY | Facility: CLINIC | Age: 51
End: 2021-01-27

## 2021-01-27 DIAGNOSIS — C53.9 MALIGNANT NEOPLASM OF CERVIX, UNSPECIFIED SITE: ICD-10-CM

## 2021-01-27 PROCEDURE — 78815 PET IMAGE W/CT SKULL-THIGH: CPT | Mod: 26,PS,, | Performed by: RADIOLOGY

## 2021-01-27 PROCEDURE — 78815 NM PET CT ROUTINE: ICD-10-PCS | Mod: 26,PS,, | Performed by: RADIOLOGY

## 2021-01-27 PROCEDURE — 78815 PET IMAGE W/CT SKULL-THIGH: CPT | Mod: TC

## 2021-01-28 ENCOUNTER — TELEPHONE (OUTPATIENT)
Dept: HEMATOLOGY/ONCOLOGY | Facility: CLINIC | Age: 51
End: 2021-01-28

## 2021-01-28 ENCOUNTER — HOSPITAL ENCOUNTER (OUTPATIENT)
Dept: RADIOLOGY | Facility: HOSPITAL | Age: 51
Discharge: HOME | End: 2021-01-28
Attending: INTERNAL MEDICINE
Payer: MEDICAID

## 2021-01-28 ENCOUNTER — HOSPITAL ENCOUNTER (OUTPATIENT)
Facility: HOSPITAL | Age: 51
Discharge: HOME | End: 2021-01-28
Attending: RADIOLOGY | Admitting: RADIOLOGY
Payer: MEDICAID

## 2021-01-28 VITALS
HEART RATE: 73 BPM | DIASTOLIC BLOOD PRESSURE: 70 MMHG | HEIGHT: 67 IN | RESPIRATION RATE: 22 BRPM | TEMPERATURE: 99 F | BODY MASS INDEX: 33.43 KG/M2 | WEIGHT: 213 LBS | SYSTOLIC BLOOD PRESSURE: 109 MMHG | OXYGEN SATURATION: 97 %

## 2021-01-28 VITALS — SYSTOLIC BLOOD PRESSURE: 111 MMHG | DIASTOLIC BLOOD PRESSURE: 57 MMHG

## 2021-01-28 DIAGNOSIS — G89.3 CANCER ASSOCIATED PAIN: ICD-10-CM

## 2021-01-28 DIAGNOSIS — C53.0 MALIGNANT NEOPLASM OF ENDOCERVIX: ICD-10-CM

## 2021-01-28 DIAGNOSIS — C77.3 MALIGNANT NEOPLASM METASTATIC TO LYMPH NODE OF AXILLA: Primary | ICD-10-CM

## 2021-01-28 DIAGNOSIS — G89.3 NEOPLASM RELATED PAIN: ICD-10-CM

## 2021-01-28 DIAGNOSIS — R87.613 HSIL (HIGH GRADE SQUAMOUS INTRAEPITHELIAL LESION) ON PAP SMEAR OF CERVIX: ICD-10-CM

## 2021-01-28 PROCEDURE — 25000003 PHARM REV CODE 250: Performed by: RADIOLOGY

## 2021-01-28 PROCEDURE — 76937 US GUIDE VASCULAR ACCESS: CPT | Mod: TC | Performed by: RADIOLOGY

## 2021-01-28 PROCEDURE — 63600175 PHARM REV CODE 636 W HCPCS: Performed by: RADIOLOGY

## 2021-01-28 PROCEDURE — C1894 INTRO/SHEATH, NON-LASER: HCPCS | Performed by: RADIOLOGY

## 2021-01-28 PROCEDURE — 77001 FLUOROGUIDE FOR VEIN DEVICE: CPT | Mod: TC | Performed by: RADIOLOGY

## 2021-01-28 PROCEDURE — 36561 INSERT TUNNELED CV CATH: CPT | Mod: RT | Performed by: RADIOLOGY

## 2021-01-28 PROCEDURE — C1788 PORT, INDWELLING, IMP: HCPCS | Performed by: RADIOLOGY

## 2021-01-28 DEVICE — POWERPORT CLEARVUE IMPLANTABLE PORT WITH ATTACHABLE 8F POLYURETHANE OPEN-ENDED SINGLE-LUMEN VENOUS CATHETER INTERMEDIATE KIT
Type: IMPLANTABLE DEVICE | Site: NECK | Status: FUNCTIONAL
Brand: POWERPORT CLEARVUE

## 2021-01-28 RX ORDER — MIDAZOLAM HYDROCHLORIDE 1 MG/ML
INJECTION INTRAMUSCULAR; INTRAVENOUS
Status: COMPLETED | OUTPATIENT
Start: 2021-01-28 | End: 2021-01-28

## 2021-01-28 RX ORDER — FENTANYL CITRATE 50 UG/ML
INJECTION, SOLUTION INTRAMUSCULAR; INTRAVENOUS
Status: COMPLETED | OUTPATIENT
Start: 2021-01-28 | End: 2021-01-28

## 2021-01-28 RX ORDER — LIDOCAINE HYDROCHLORIDE 20 MG/ML
INJECTION, SOLUTION INFILTRATION; PERINEURAL
Status: COMPLETED | OUTPATIENT
Start: 2021-01-28 | End: 2021-01-28

## 2021-01-28 RX ORDER — HYDROCODONE BITARTRATE AND ACETAMINOPHEN 10; 325 MG/1; MG/1
1 TABLET ORAL EVERY 4 HOURS PRN
Qty: 120 TABLET | Refills: 0 | Status: SHIPPED | OUTPATIENT
Start: 2021-01-28 | End: 2021-02-01 | Stop reason: SDUPTHER

## 2021-01-28 RX ORDER — CEFAZOLIN SODIUM 2 G/50ML
SOLUTION INTRAVENOUS CONTINUOUS PRN
Status: COMPLETED | OUTPATIENT
Start: 2021-01-28 | End: 2021-01-28

## 2021-01-28 RX ADMIN — FENTANYL CITRATE 50 MCG: 50 INJECTION, SOLUTION INTRAMUSCULAR; INTRAVENOUS at 02:01

## 2021-01-28 RX ADMIN — CEFAZOLIN SODIUM 2 G: 2 SOLUTION INTRAVENOUS at 01:01

## 2021-01-28 RX ADMIN — LIDOCAINE HYDROCHLORIDE 5 ML: 20 INJECTION, SOLUTION INFILTRATION; PERINEURAL at 02:01

## 2021-01-28 RX ADMIN — MIDAZOLAM HYDROCHLORIDE 1 MG: 1 INJECTION INTRAMUSCULAR; INTRAVENOUS at 02:01

## 2021-01-29 ENCOUNTER — OFFICE VISIT (OUTPATIENT)
Dept: HEMATOLOGY/ONCOLOGY | Facility: CLINIC | Age: 51
End: 2021-01-29
Payer: MEDICAID

## 2021-01-29 ENCOUNTER — TUMOR BOARD CONFERENCE (OUTPATIENT)
Dept: HEMATOLOGY/ONCOLOGY | Facility: CLINIC | Age: 51
End: 2021-01-29

## 2021-01-29 VITALS
WEIGHT: 217.63 LBS | HEART RATE: 77 BPM | BODY MASS INDEX: 34.16 KG/M2 | OXYGEN SATURATION: 98 % | HEIGHT: 67 IN | DIASTOLIC BLOOD PRESSURE: 66 MMHG | TEMPERATURE: 99 F | SYSTOLIC BLOOD PRESSURE: 111 MMHG

## 2021-01-29 DIAGNOSIS — Z17.0 MALIGNANT NEOPLASM OF OVERLAPPING SITES OF RIGHT BREAST IN FEMALE, ESTROGEN RECEPTOR POSITIVE: ICD-10-CM

## 2021-01-29 DIAGNOSIS — C73 PAPILLARY THYROID CARCINOMA: ICD-10-CM

## 2021-01-29 DIAGNOSIS — C53.0 MALIGNANT NEOPLASM OF ENDOCERVIX: Primary | ICD-10-CM

## 2021-01-29 DIAGNOSIS — C77.3 MALIGNANT NEOPLASM METASTATIC TO LYMPH NODE OF AXILLA: ICD-10-CM

## 2021-01-29 DIAGNOSIS — C50.811 MALIGNANT NEOPLASM OF OVERLAPPING SITES OF RIGHT BREAST IN FEMALE, ESTROGEN RECEPTOR POSITIVE: ICD-10-CM

## 2021-01-29 DIAGNOSIS — C79.51 SECONDARY CANCER OF BONE: ICD-10-CM

## 2021-01-29 PROCEDURE — 99999 PR PBB SHADOW E&M-EST. PATIENT-LVL V: ICD-10-PCS | Mod: PBBFAC,,, | Performed by: INTERNAL MEDICINE

## 2021-01-29 PROCEDURE — 99215 OFFICE O/P EST HI 40 MIN: CPT | Mod: PBBFAC | Performed by: INTERNAL MEDICINE

## 2021-01-29 PROCEDURE — 99215 PR OFFICE/OUTPT VISIT, EST, LEVL V, 40-54 MIN: ICD-10-PCS | Mod: S$PBB,,, | Performed by: INTERNAL MEDICINE

## 2021-01-29 PROCEDURE — 99999 PR PBB SHADOW E&M-EST. PATIENT-LVL V: CPT | Mod: PBBFAC,,, | Performed by: INTERNAL MEDICINE

## 2021-01-29 PROCEDURE — 99215 OFFICE O/P EST HI 40 MIN: CPT | Mod: S$PBB,,, | Performed by: INTERNAL MEDICINE

## 2021-02-01 ENCOUNTER — PATIENT MESSAGE (OUTPATIENT)
Dept: PULMONOLOGY | Facility: CLINIC | Age: 51
End: 2021-02-01

## 2021-02-01 ENCOUNTER — DOCUMENTATION ONLY (OUTPATIENT)
Dept: HEMATOLOGY/ONCOLOGY | Facility: CLINIC | Age: 51
End: 2021-02-01

## 2021-02-01 DIAGNOSIS — G89.3 CANCER ASSOCIATED PAIN: ICD-10-CM

## 2021-02-01 DIAGNOSIS — Z20.822 ENCOUNTER FOR LABORATORY TESTING FOR COVID-19 VIRUS: ICD-10-CM

## 2021-02-02 ENCOUNTER — DOCUMENTATION ONLY (OUTPATIENT)
Dept: HEMATOLOGY/ONCOLOGY | Facility: CLINIC | Age: 51
End: 2021-02-02

## 2021-02-02 ENCOUNTER — SPECIALTY PHARMACY (OUTPATIENT)
Dept: PHARMACY | Facility: CLINIC | Age: 51
End: 2021-02-02

## 2021-02-02 ENCOUNTER — OFFICE VISIT (OUTPATIENT)
Dept: HEMATOLOGY/ONCOLOGY | Facility: CLINIC | Age: 51
End: 2021-02-02
Payer: MEDICAID

## 2021-02-02 DIAGNOSIS — C53.0 MALIGNANT NEOPLASM OF ENDOCERVIX: Primary | ICD-10-CM

## 2021-02-02 PROCEDURE — 99999 PR PBB SHADOW E&M-EST. PATIENT-LVL III: CPT | Mod: PBBFAC,,, | Performed by: NURSE PRACTITIONER

## 2021-02-02 PROCEDURE — 99999 PR PBB SHADOW E&M-EST. PATIENT-LVL III: ICD-10-PCS | Mod: PBBFAC,,, | Performed by: NURSE PRACTITIONER

## 2021-02-02 PROCEDURE — 99215 OFFICE O/P EST HI 40 MIN: CPT | Mod: S$PBB,,, | Performed by: NURSE PRACTITIONER

## 2021-02-02 PROCEDURE — 99215 PR OFFICE/OUTPT VISIT, EST, LEVL V, 40-54 MIN: ICD-10-PCS | Mod: S$PBB,,, | Performed by: NURSE PRACTITIONER

## 2021-02-02 PROCEDURE — 99213 OFFICE O/P EST LOW 20 MIN: CPT | Mod: PBBFAC | Performed by: NURSE PRACTITIONER

## 2021-02-02 RX ORDER — DEXAMETHASONE 4 MG/1
8 TABLET ORAL DAILY
Qty: 24 TABLET | Refills: 1 | Status: CANCELLED | OUTPATIENT
Start: 2021-02-05

## 2021-02-02 RX ORDER — ONDANSETRON 8 MG/1
8 TABLET, ORALLY DISINTEGRATING ORAL EVERY 8 HOURS PRN
Qty: 60 TABLET | Refills: 5 | Status: CANCELLED | OUTPATIENT
Start: 2021-02-04

## 2021-02-02 RX ORDER — ONDANSETRON 8 MG/1
8 TABLET, ORALLY DISINTEGRATING ORAL EVERY 8 HOURS PRN
Qty: 60 TABLET | Refills: 5 | Status: SHIPPED | OUTPATIENT
Start: 2021-02-02 | End: 2021-07-12

## 2021-02-02 RX ORDER — DEXAMETHASONE 4 MG/1
8 TABLET ORAL DAILY
Qty: 24 TABLET | Refills: 1 | Status: SHIPPED | OUTPATIENT
Start: 2021-02-03 | End: 2021-07-12

## 2021-02-02 RX ORDER — HYDROCODONE BITARTRATE AND ACETAMINOPHEN 10; 325 MG/1; MG/1
1 TABLET ORAL EVERY 4 HOURS PRN
Qty: 120 TABLET | Refills: 0 | Status: SHIPPED | OUTPATIENT
Start: 2021-02-02 | End: 2021-03-07 | Stop reason: SDUPTHER

## 2021-02-03 ENCOUNTER — DOCUMENTATION ONLY (OUTPATIENT)
Dept: HEMATOLOGY/ONCOLOGY | Facility: CLINIC | Age: 51
End: 2021-02-03

## 2021-02-03 ENCOUNTER — OFFICE VISIT (OUTPATIENT)
Dept: GYNECOLOGIC ONCOLOGY | Facility: CLINIC | Age: 51
End: 2021-02-03
Payer: MEDICAID

## 2021-02-03 ENCOUNTER — TELEPHONE (OUTPATIENT)
Dept: GYNECOLOGIC ONCOLOGY | Facility: CLINIC | Age: 51
End: 2021-02-03

## 2021-02-03 DIAGNOSIS — C53.0 MALIGNANT NEOPLASM OF ENDOCERVIX: Primary | ICD-10-CM

## 2021-02-03 DIAGNOSIS — C73 PAPILLARY THYROID CARCINOMA: ICD-10-CM

## 2021-02-03 DIAGNOSIS — C79.51 SECONDARY CANCER OF BONE: ICD-10-CM

## 2021-02-03 DIAGNOSIS — C77.3 MALIGNANT NEOPLASM METASTATIC TO LYMPH NODE OF AXILLA: ICD-10-CM

## 2021-02-03 DIAGNOSIS — E66.9 OBESITY (BMI 30-39.9): ICD-10-CM

## 2021-02-03 DIAGNOSIS — C50.811 MALIGNANT NEOPLASM OF OVERLAPPING SITES OF RIGHT BREAST IN FEMALE, ESTROGEN RECEPTOR POSITIVE: ICD-10-CM

## 2021-02-03 DIAGNOSIS — F32.A DEPRESSION, UNSPECIFIED DEPRESSION TYPE: ICD-10-CM

## 2021-02-03 DIAGNOSIS — Z72.0 TOBACCO ABUSE: ICD-10-CM

## 2021-02-03 DIAGNOSIS — Z17.0 MALIGNANT NEOPLASM OF OVERLAPPING SITES OF RIGHT BREAST IN FEMALE, ESTROGEN RECEPTOR POSITIVE: ICD-10-CM

## 2021-02-03 DIAGNOSIS — F41.9 ANXIETY: ICD-10-CM

## 2021-02-03 DIAGNOSIS — J44.9 CHRONIC OBSTRUCTIVE PULMONARY DISEASE, UNSPECIFIED COPD TYPE: ICD-10-CM

## 2021-02-03 DIAGNOSIS — G89.3 NEOPLASM RELATED PAIN: ICD-10-CM

## 2021-02-03 PROCEDURE — 99214 PR OFFICE/OUTPT VISIT, EST, LEVL IV, 30-39 MIN: ICD-10-PCS | Mod: 95,,, | Performed by: OBSTETRICS & GYNECOLOGY

## 2021-02-03 PROCEDURE — 99214 OFFICE O/P EST MOD 30 MIN: CPT | Mod: 95,,, | Performed by: OBSTETRICS & GYNECOLOGY

## 2021-02-03 RX ORDER — LETROZOLE 2.5 MG/1
2.5 TABLET, FILM COATED ORAL DAILY
Qty: 90 TABLET | Refills: 3 | Status: SHIPPED | OUTPATIENT
Start: 2021-02-03 | End: 2022-02-04 | Stop reason: SDUPTHER

## 2021-02-04 ENCOUNTER — TELEPHONE (OUTPATIENT)
Dept: HEMATOLOGY/ONCOLOGY | Facility: CLINIC | Age: 51
End: 2021-02-04

## 2021-02-05 ENCOUNTER — SPECIALTY PHARMACY (OUTPATIENT)
Dept: PHARMACY | Facility: CLINIC | Age: 51
End: 2021-02-05

## 2021-02-05 ENCOUNTER — OFFICE VISIT (OUTPATIENT)
Dept: PULMONOLOGY | Facility: CLINIC | Age: 51
End: 2021-02-05
Payer: MEDICAID

## 2021-02-05 VITALS
RESPIRATION RATE: 18 BRPM | WEIGHT: 215.94 LBS | BODY MASS INDEX: 33.89 KG/M2 | HEIGHT: 67 IN | SYSTOLIC BLOOD PRESSURE: 112 MMHG | HEART RATE: 57 BPM | TEMPERATURE: 98 F | DIASTOLIC BLOOD PRESSURE: 80 MMHG | OXYGEN SATURATION: 99 %

## 2021-02-05 DIAGNOSIS — J84.10 CALCIFIED GRANULOMA OF LUNG: ICD-10-CM

## 2021-02-05 DIAGNOSIS — F17.210 TOBACCO DEPENDENCE DUE TO CIGARETTES: ICD-10-CM

## 2021-02-05 DIAGNOSIS — J44.89 COPD WITH ASTHMA: ICD-10-CM

## 2021-02-05 DIAGNOSIS — R94.2 ABNORMAL PET SCAN, LUNG: Primary | ICD-10-CM

## 2021-02-05 DIAGNOSIS — R59.0 MEDIASTINAL LYMPHADENOPATHY: ICD-10-CM

## 2021-02-05 PROCEDURE — 99999 PR PBB SHADOW E&M-EST. PATIENT-LVL V: CPT | Mod: PBBFAC,,, | Performed by: INTERNAL MEDICINE

## 2021-02-05 PROCEDURE — 99215 PR OFFICE/OUTPT VISIT, EST, LEVL V, 40-54 MIN: ICD-10-PCS | Mod: S$PBB,,, | Performed by: INTERNAL MEDICINE

## 2021-02-05 PROCEDURE — 99999 PR PBB SHADOW E&M-EST. PATIENT-LVL V: ICD-10-PCS | Mod: PBBFAC,,, | Performed by: INTERNAL MEDICINE

## 2021-02-05 PROCEDURE — 99215 OFFICE O/P EST HI 40 MIN: CPT | Mod: PBBFAC | Performed by: INTERNAL MEDICINE

## 2021-02-05 PROCEDURE — 99215 OFFICE O/P EST HI 40 MIN: CPT | Mod: S$PBB,,, | Performed by: INTERNAL MEDICINE

## 2021-02-08 ENCOUNTER — CLINICAL SUPPORT (OUTPATIENT)
Dept: AUDIOLOGY | Facility: CLINIC | Age: 51
End: 2021-02-08
Payer: MEDICAID

## 2021-02-08 ENCOUNTER — DOCUMENTATION ONLY (OUTPATIENT)
Dept: HEMATOLOGY/ONCOLOGY | Facility: CLINIC | Age: 51
End: 2021-02-08

## 2021-02-08 DIAGNOSIS — C53.0 MALIGNANT NEOPLASM OF ENDOCERVIX: ICD-10-CM

## 2021-02-08 DIAGNOSIS — H61.23 IMPACTED CERUMEN, BILATERAL: Primary | ICD-10-CM

## 2021-02-10 ENCOUNTER — TELEPHONE (OUTPATIENT)
Dept: HEMATOLOGY/ONCOLOGY | Facility: CLINIC | Age: 51
End: 2021-02-10

## 2021-02-12 ENCOUNTER — TELEPHONE (OUTPATIENT)
Dept: ENDOSCOPY | Facility: HOSPITAL | Age: 51
End: 2021-02-12

## 2021-02-18 ENCOUNTER — CLINICAL SUPPORT (OUTPATIENT)
Dept: AUDIOLOGY | Facility: CLINIC | Age: 51
End: 2021-02-18
Payer: MEDICAID

## 2021-02-18 ENCOUNTER — ANESTHESIA EVENT (OUTPATIENT)
Dept: ENDOSCOPY | Facility: HOSPITAL | Age: 51
End: 2021-02-18
Payer: MEDICAID

## 2021-02-18 ENCOUNTER — ANESTHESIA (OUTPATIENT)
Dept: ENDOSCOPY | Facility: HOSPITAL | Age: 51
End: 2021-02-18
Payer: MEDICAID

## 2021-02-18 ENCOUNTER — OFFICE VISIT (OUTPATIENT)
Dept: OTOLARYNGOLOGY | Facility: CLINIC | Age: 51
End: 2021-02-18
Payer: MEDICAID

## 2021-02-18 ENCOUNTER — HOSPITAL ENCOUNTER (OUTPATIENT)
Facility: HOSPITAL | Age: 51
Discharge: HOME | End: 2021-02-18
Attending: INTERNAL MEDICINE | Admitting: INTERNAL MEDICINE
Payer: MEDICAID

## 2021-02-18 VITALS
TEMPERATURE: 97 F | RESPIRATION RATE: 22 BRPM | OXYGEN SATURATION: 93 % | BODY MASS INDEX: 33.46 KG/M2 | SYSTOLIC BLOOD PRESSURE: 109 MMHG | HEIGHT: 67 IN | DIASTOLIC BLOOD PRESSURE: 79 MMHG | HEART RATE: 79 BPM | WEIGHT: 213.19 LBS

## 2021-02-18 VITALS
TEMPERATURE: 98 F | BODY MASS INDEX: 33.39 KG/M2 | DIASTOLIC BLOOD PRESSURE: 80 MMHG | SYSTOLIC BLOOD PRESSURE: 112 MMHG | WEIGHT: 213.19 LBS | HEART RATE: 57 BPM

## 2021-02-18 DIAGNOSIS — R94.2 ABNORMAL PET SCAN, LUNG: ICD-10-CM

## 2021-02-18 DIAGNOSIS — H90.3 SENSORINEURAL HEARING LOSS, BILATERAL: Primary | ICD-10-CM

## 2021-02-18 DIAGNOSIS — H61.23 BILATERAL IMPACTED CERUMEN: Primary | ICD-10-CM

## 2021-02-18 LAB — SARS-COV-2 RDRP RESP QL NAA+PROBE: NEGATIVE

## 2021-02-18 PROCEDURE — 88305 TISSUE EXAM BY PATHOLOGIST: CPT | Mod: 26,59,, | Performed by: STUDENT IN AN ORGANIZED HEALTH CARE EDUCATION/TRAINING PROGRAM

## 2021-02-18 PROCEDURE — 88173 PR  INTERPRETATION OF FNA SMEAR: ICD-10-PCS | Mod: 26,59,, | Performed by: PATHOLOGY

## 2021-02-18 PROCEDURE — 88173 CYTOPATH EVAL FNA REPORT: CPT | Performed by: PATHOLOGY

## 2021-02-18 PROCEDURE — 88173 PR  INTERPRETATION OF FNA SMEAR: ICD-10-PCS | Mod: 26,,, | Performed by: STUDENT IN AN ORGANIZED HEALTH CARE EDUCATION/TRAINING PROGRAM

## 2021-02-18 PROCEDURE — 88173 CYTOPATH EVAL FNA REPORT: CPT | Mod: 26,59,, | Performed by: PATHOLOGY

## 2021-02-18 PROCEDURE — 88341 IMHCHEM/IMCYTCHM EA ADD ANTB: CPT | Mod: 26,,, | Performed by: PATHOLOGY

## 2021-02-18 PROCEDURE — 88172 CYTP DX EVAL FNA 1ST EA SITE: CPT | Performed by: PATHOLOGY

## 2021-02-18 PROCEDURE — 88342 IMHCHEM/IMCYTCHM 1ST ANTB: CPT | Performed by: PATHOLOGY

## 2021-02-18 PROCEDURE — U0002 COVID-19 LAB TEST NON-CDC: HCPCS

## 2021-02-18 PROCEDURE — 99999 PR PBB SHADOW E&M-EST. PATIENT-LVL I: ICD-10-PCS | Mod: PBBFAC,,, | Performed by: AUDIOLOGIST-HEARING AID FITTER

## 2021-02-18 PROCEDURE — 25000003 PHARM REV CODE 250: Performed by: ANESTHESIOLOGY

## 2021-02-18 PROCEDURE — 88341 PR IHC OR ICC EACH ADD'L SINGLE ANTIBODY  STAINPR: ICD-10-PCS | Mod: 26,,, | Performed by: PATHOLOGY

## 2021-02-18 PROCEDURE — 31652 PR BRONCH W/ EBUS, SAMPLING 1 OR 2 NODES, INCL GUIDE: ICD-10-PCS | Mod: ,,, | Performed by: INTERNAL MEDICINE

## 2021-02-18 PROCEDURE — 99211 OFF/OP EST MAY X REQ PHY/QHP: CPT | Mod: PBBFAC,27,25 | Performed by: AUDIOLOGIST-HEARING AID FITTER

## 2021-02-18 PROCEDURE — 31652 BRONCH EBUS SAMPLNG 1/2 NODE: CPT | Performed by: INTERNAL MEDICINE

## 2021-02-18 PROCEDURE — 88342 IMHCHEM/IMCYTCHM 1ST ANTB: CPT | Mod: 59 | Performed by: STUDENT IN AN ORGANIZED HEALTH CARE EDUCATION/TRAINING PROGRAM

## 2021-02-18 PROCEDURE — 88305 TISSUE EXAM BY PATHOLOGIST: ICD-10-PCS | Mod: 26,,, | Performed by: PATHOLOGY

## 2021-02-18 PROCEDURE — 27202059 HC NEEDLE, FNA (ANY)

## 2021-02-18 PROCEDURE — 88173 CYTOPATH EVAL FNA REPORT: CPT | Mod: 26,,, | Performed by: STUDENT IN AN ORGANIZED HEALTH CARE EDUCATION/TRAINING PROGRAM

## 2021-02-18 PROCEDURE — 63600175 PHARM REV CODE 636 W HCPCS: Performed by: ANESTHESIOLOGY

## 2021-02-18 PROCEDURE — 69210 REMOVE IMPACTED EAR WAX UNI: CPT | Mod: PBBFAC | Performed by: PHYSICIAN ASSISTANT

## 2021-02-18 PROCEDURE — 88341 IMHCHEM/IMCYTCHM EA ADD ANTB: CPT | Mod: 59 | Performed by: STUDENT IN AN ORGANIZED HEALTH CARE EDUCATION/TRAINING PROGRAM

## 2021-02-18 PROCEDURE — 00520 ANES CLOSED CHEST PX NOS: CPT | Performed by: INTERNAL MEDICINE

## 2021-02-18 PROCEDURE — 92567 TYMPANOMETRY: CPT | Mod: PBBFAC | Performed by: AUDIOLOGIST-HEARING AID FITTER

## 2021-02-18 PROCEDURE — 69210 PR REMOVAL IMPACTED CERUMEN REQUIRING INSTRUMENTATION, UNILATERAL: ICD-10-PCS | Mod: S$PBB,,, | Performed by: PHYSICIAN ASSISTANT

## 2021-02-18 PROCEDURE — 99499 NO LOS: ICD-10-PCS | Mod: S$PBB,,, | Performed by: PHYSICIAN ASSISTANT

## 2021-02-18 PROCEDURE — 88305 TISSUE EXAM BY PATHOLOGIST: CPT | Performed by: PATHOLOGY

## 2021-02-18 PROCEDURE — 88177 CYTP FNA EVAL EA ADDL: CPT | Performed by: PATHOLOGY

## 2021-02-18 PROCEDURE — 99999 PR PBB SHADOW E&M-EST. PATIENT-LVL IV: ICD-10-PCS | Mod: PBBFAC,,, | Performed by: PHYSICIAN ASSISTANT

## 2021-02-18 PROCEDURE — 99499 UNLISTED E&M SERVICE: CPT | Mod: S$PBB,,, | Performed by: PHYSICIAN ASSISTANT

## 2021-02-18 PROCEDURE — 92557 COMPREHENSIVE HEARING TEST: CPT | Mod: PBBFAC | Performed by: AUDIOLOGIST-HEARING AID FITTER

## 2021-02-18 PROCEDURE — 37000009 HC ANESTHESIA EA ADD 15 MINS: Performed by: INTERNAL MEDICINE

## 2021-02-18 PROCEDURE — 99999 PR PBB SHADOW E&M-EST. PATIENT-LVL IV: CPT | Mod: PBBFAC,,, | Performed by: PHYSICIAN ASSISTANT

## 2021-02-18 PROCEDURE — 88342 CHG IMMUNOCYTOCHEMISTRY: ICD-10-PCS | Mod: 26,,, | Performed by: PATHOLOGY

## 2021-02-18 PROCEDURE — 88341 IMHCHEM/IMCYTCHM EA ADD ANTB: CPT | Mod: 59 | Performed by: PATHOLOGY

## 2021-02-18 PROCEDURE — 99999 PR PBB SHADOW E&M-EST. PATIENT-LVL I: CPT | Mod: PBBFAC,,, | Performed by: AUDIOLOGIST-HEARING AID FITTER

## 2021-02-18 PROCEDURE — 88342 CHG IMMUNOCYTOCHEMISTRY: ICD-10-PCS | Mod: 26,,, | Performed by: STUDENT IN AN ORGANIZED HEALTH CARE EDUCATION/TRAINING PROGRAM

## 2021-02-18 PROCEDURE — 88305 TISSUE EXAM BY PATHOLOGIST: CPT | Mod: 26,,, | Performed by: PATHOLOGY

## 2021-02-18 PROCEDURE — 88305 TISSUE EXAM BY PATHOLOGIST: ICD-10-PCS | Mod: 26,59,, | Performed by: STUDENT IN AN ORGANIZED HEALTH CARE EDUCATION/TRAINING PROGRAM

## 2021-02-18 PROCEDURE — 88342 IMHCHEM/IMCYTCHM 1ST ANTB: CPT | Mod: 26,,, | Performed by: PATHOLOGY

## 2021-02-18 PROCEDURE — 88342 IMHCHEM/IMCYTCHM 1ST ANTB: CPT | Mod: 26,,, | Performed by: STUDENT IN AN ORGANIZED HEALTH CARE EDUCATION/TRAINING PROGRAM

## 2021-02-18 PROCEDURE — 88305 TISSUE EXAM BY PATHOLOGIST: CPT | Mod: 59 | Performed by: STUDENT IN AN ORGANIZED HEALTH CARE EDUCATION/TRAINING PROGRAM

## 2021-02-18 PROCEDURE — 37000008 HC ANESTHESIA 1ST 15 MINUTES: Performed by: INTERNAL MEDICINE

## 2021-02-18 PROCEDURE — 31652 BRONCH EBUS SAMPLNG 1/2 NODE: CPT | Mod: ,,, | Performed by: INTERNAL MEDICINE

## 2021-02-18 PROCEDURE — 69210 REMOVE IMPACTED EAR WAX UNI: CPT | Mod: S$PBB,,, | Performed by: PHYSICIAN ASSISTANT

## 2021-02-18 PROCEDURE — 88177 CYTP FNA EVAL EA ADDL: CPT | Mod: 59 | Performed by: STUDENT IN AN ORGANIZED HEALTH CARE EDUCATION/TRAINING PROGRAM

## 2021-02-18 PROCEDURE — 88341 IMHCHEM/IMCYTCHM EA ADD ANTB: CPT | Mod: 26,,, | Performed by: STUDENT IN AN ORGANIZED HEALTH CARE EDUCATION/TRAINING PROGRAM

## 2021-02-18 PROCEDURE — 88172 CYTP DX EVAL FNA 1ST EA SITE: CPT | Mod: 59 | Performed by: STUDENT IN AN ORGANIZED HEALTH CARE EDUCATION/TRAINING PROGRAM

## 2021-02-18 PROCEDURE — 88173 CYTOPATH EVAL FNA REPORT: CPT | Mod: 59 | Performed by: STUDENT IN AN ORGANIZED HEALTH CARE EDUCATION/TRAINING PROGRAM

## 2021-02-18 PROCEDURE — 88341 PR IHC OR ICC EACH ADD'L SINGLE ANTIBODY  STAINPR: ICD-10-PCS | Mod: 26,,, | Performed by: STUDENT IN AN ORGANIZED HEALTH CARE EDUCATION/TRAINING PROGRAM

## 2021-02-18 PROCEDURE — 99214 OFFICE O/P EST MOD 30 MIN: CPT | Mod: PBBFAC | Performed by: PHYSICIAN ASSISTANT

## 2021-02-18 RX ORDER — ONDANSETRON 2 MG/ML
INJECTION INTRAMUSCULAR; INTRAVENOUS
Status: DISCONTINUED | OUTPATIENT
Start: 2021-02-18 | End: 2021-02-18

## 2021-02-18 RX ORDER — SODIUM CHLORIDE, SODIUM LACTATE, POTASSIUM CHLORIDE, CALCIUM CHLORIDE 600; 310; 30; 20 MG/100ML; MG/100ML; MG/100ML; MG/100ML
INJECTION, SOLUTION INTRAVENOUS CONTINUOUS PRN
Status: DISCONTINUED | OUTPATIENT
Start: 2021-02-18 | End: 2021-02-18

## 2021-02-18 RX ORDER — PROPOFOL 10 MG/ML
VIAL (ML) INTRAVENOUS
Status: DISCONTINUED | OUTPATIENT
Start: 2021-02-18 | End: 2021-02-18

## 2021-02-18 RX ORDER — FENTANYL CITRATE 50 UG/ML
INJECTION, SOLUTION INTRAMUSCULAR; INTRAVENOUS
Status: DISCONTINUED | OUTPATIENT
Start: 2021-02-18 | End: 2021-02-18

## 2021-02-18 RX ORDER — LIDOCAINE HYDROCHLORIDE 10 MG/ML
INJECTION, SOLUTION EPIDURAL; INFILTRATION; INTRACAUDAL; PERINEURAL
Status: DISCONTINUED | OUTPATIENT
Start: 2021-02-18 | End: 2021-02-18

## 2021-02-18 RX ORDER — SUCCINYLCHOLINE CHLORIDE 20 MG/ML
INJECTION INTRAMUSCULAR; INTRAVENOUS
Status: DISCONTINUED | OUTPATIENT
Start: 2021-02-18 | End: 2021-02-18

## 2021-02-18 RX ADMIN — PROPOFOL 90 MG: 10 INJECTION, EMULSION INTRAVENOUS at 08:02

## 2021-02-18 RX ADMIN — SUCCINYLCHOLINE CHLORIDE 140 MG: 20 INJECTION, SOLUTION INTRAMUSCULAR; INTRAVENOUS at 08:02

## 2021-02-18 RX ADMIN — LIDOCAINE HYDROCHLORIDE 80 MG: 10 INJECTION, SOLUTION EPIDURAL; INFILTRATION; INTRACAUDAL; PERINEURAL at 08:02

## 2021-02-18 RX ADMIN — FENTANYL CITRATE 100 MCG: 50 INJECTION, SOLUTION INTRAMUSCULAR; INTRAVENOUS at 08:02

## 2021-02-18 RX ADMIN — ONDANSETRON 4 MG: 2 INJECTION, SOLUTION INTRAMUSCULAR; INTRAVENOUS at 08:02

## 2021-02-18 RX ADMIN — SODIUM CHLORIDE, SODIUM LACTATE, POTASSIUM CHLORIDE, AND CALCIUM CHLORIDE: .6; .31; .03; .02 INJECTION, SOLUTION INTRAVENOUS at 08:02

## 2021-02-19 ENCOUNTER — HOSPITAL ENCOUNTER (EMERGENCY)
Facility: HOSPITAL | Age: 51
Discharge: HOME | End: 2021-02-19
Attending: EMERGENCY MEDICINE
Payer: MEDICAID

## 2021-02-19 VITALS
RESPIRATION RATE: 18 BRPM | TEMPERATURE: 98 F | BODY MASS INDEX: 33.75 KG/M2 | DIASTOLIC BLOOD PRESSURE: 65 MMHG | SYSTOLIC BLOOD PRESSURE: 113 MMHG | OXYGEN SATURATION: 96 % | HEART RATE: 73 BPM | WEIGHT: 215.5 LBS

## 2021-02-19 DIAGNOSIS — R52 BODY ACHES: Primary | ICD-10-CM

## 2021-02-19 DIAGNOSIS — Z72.0 TOBACCO ABUSE DISORDER: ICD-10-CM

## 2021-02-19 DIAGNOSIS — G89.4 CHRONIC PAIN DISORDER: ICD-10-CM

## 2021-02-19 DIAGNOSIS — Z85.41 HISTORY OF CERVICAL CANCER: ICD-10-CM

## 2021-02-19 LAB
ALBUMIN SERPL BCP-MCNC: 3.8 G/DL (ref 3.5–5.2)
ALP SERPL-CCNC: 76 U/L (ref 55–135)
ALT SERPL W/O P-5'-P-CCNC: 26 U/L (ref 10–44)
ANION GAP SERPL CALC-SCNC: 10 MMOL/L (ref 8–16)
AST SERPL-CCNC: 24 U/L (ref 10–40)
BASOPHILS # BLD AUTO: 0.07 K/UL (ref 0–0.2)
BASOPHILS NFR BLD: 0.9 % (ref 0–1.9)
BILIRUB SERPL-MCNC: 0.4 MG/DL (ref 0.1–1)
BILIRUB UR QL STRIP: NEGATIVE
BUN SERPL-MCNC: 10 MG/DL (ref 6–20)
CALCIUM SERPL-MCNC: 9.1 MG/DL (ref 8.7–10.5)
CHLORIDE SERPL-SCNC: 101 MMOL/L (ref 95–110)
CLARITY UR: CLEAR
CO2 SERPL-SCNC: 27 MMOL/L (ref 23–29)
COLOR UR: YELLOW
CREAT SERPL-MCNC: 1.2 MG/DL (ref 0.5–1.4)
CTP QC/QA: YES
CTP QC/QA: YES
DIFFERENTIAL METHOD: ABNORMAL
EOSINOPHIL # BLD AUTO: 0.3 K/UL (ref 0–0.5)
EOSINOPHIL NFR BLD: 3.4 % (ref 0–8)
ERYTHROCYTE [DISTWIDTH] IN BLOOD BY AUTOMATED COUNT: 13.4 % (ref 11.5–14.5)
EST. GFR  (AFRICAN AMERICAN): >60 ML/MIN/1.73 M^2
EST. GFR  (NON AFRICAN AMERICAN): 52 ML/MIN/1.73 M^2
GLUCOSE SERPL-MCNC: 106 MG/DL (ref 70–110)
GLUCOSE UR QL STRIP: NEGATIVE
HCT VFR BLD AUTO: 37.8 % (ref 37–48.5)
HGB BLD-MCNC: 12.7 G/DL (ref 12–16)
HGB UR QL STRIP: ABNORMAL
IMM GRANULOCYTES # BLD AUTO: 0.17 K/UL (ref 0–0.04)
IMM GRANULOCYTES NFR BLD AUTO: 2.1 % (ref 0–0.5)
KETONES UR QL STRIP: NEGATIVE
LEUKOCYTE ESTERASE UR QL STRIP: ABNORMAL
LYMPHOCYTES # BLD AUTO: 1.9 K/UL (ref 1–4.8)
LYMPHOCYTES NFR BLD: 22.7 % (ref 18–48)
MCH RBC QN AUTO: 36 PG (ref 27–31)
MCHC RBC AUTO-ENTMCNC: 33.6 G/DL (ref 32–36)
MCV RBC AUTO: 107 FL (ref 82–98)
MICROSCOPIC COMMENT: NORMAL
MONOCYTES # BLD AUTO: 0.6 K/UL (ref 0.3–1)
MONOCYTES NFR BLD: 7.5 % (ref 4–15)
NEUTROPHILS # BLD AUTO: 5.2 K/UL (ref 1.8–7.7)
NEUTROPHILS NFR BLD: 63.4 % (ref 38–73)
NITRITE UR QL STRIP: NEGATIVE
NRBC BLD-RTO: 0 /100 WBC
PH UR STRIP: 5 [PH] (ref 5–8)
PLATELET # BLD AUTO: 223 K/UL (ref 150–350)
PMV BLD AUTO: 11.8 FL (ref 9.2–12.9)
POC MOLECULAR INFLUENZA A AGN: NEGATIVE
POC MOLECULAR INFLUENZA B AGN: NEGATIVE
POTASSIUM SERPL-SCNC: 3.9 MMOL/L (ref 3.5–5.1)
PROT SERPL-MCNC: 7 G/DL (ref 6–8.4)
PROT UR QL STRIP: NEGATIVE
RBC # BLD AUTO: 3.53 M/UL (ref 4–5.4)
RBC #/AREA URNS HPF: 2 /HPF (ref 0–4)
SARS-COV-2 RDRP RESP QL NAA+PROBE: NEGATIVE
SODIUM SERPL-SCNC: 138 MMOL/L (ref 136–145)
SP GR UR STRIP: 1.02 (ref 1–1.03)
URN SPEC COLLECT METH UR: ABNORMAL
UROBILINOGEN UR STRIP-ACNC: NEGATIVE EU/DL
WBC # BLD AUTO: 8.16 K/UL (ref 3.9–12.7)
WBC #/AREA URNS HPF: 1 /HPF (ref 0–5)

## 2021-02-19 PROCEDURE — U0002 COVID-19 LAB TEST NON-CDC: HCPCS | Performed by: NURSE PRACTITIONER

## 2021-02-19 PROCEDURE — 96372 THER/PROPH/DIAG INJ SC/IM: CPT

## 2021-02-19 PROCEDURE — 85025 COMPLETE CBC W/AUTO DIFF WBC: CPT | Mod: 91

## 2021-02-19 PROCEDURE — 80053 COMPREHEN METABOLIC PANEL: CPT | Mod: 91

## 2021-02-19 PROCEDURE — 99284 EMERGENCY DEPT VISIT MOD MDM: CPT | Mod: 25

## 2021-02-19 PROCEDURE — 25000003 PHARM REV CODE 250: Performed by: EMERGENCY MEDICINE

## 2021-02-19 PROCEDURE — 63600175 PHARM REV CODE 636 W HCPCS: Performed by: EMERGENCY MEDICINE

## 2021-02-19 PROCEDURE — 81000 URINALYSIS NONAUTO W/SCOPE: CPT | Mod: 91

## 2021-02-19 RX ORDER — HYDROMORPHONE HYDROCHLORIDE 1 MG/ML
1 INJECTION, SOLUTION INTRAMUSCULAR; INTRAVENOUS; SUBCUTANEOUS
Status: DISCONTINUED | OUTPATIENT
Start: 2021-02-19 | End: 2021-02-19

## 2021-02-19 RX ORDER — ONDANSETRON 4 MG/1
4 TABLET, ORALLY DISINTEGRATING ORAL
Status: COMPLETED | OUTPATIENT
Start: 2021-02-19 | End: 2021-02-19

## 2021-02-19 RX ORDER — ONDANSETRON 2 MG/ML
4 INJECTION INTRAMUSCULAR; INTRAVENOUS
Status: DISCONTINUED | OUTPATIENT
Start: 2021-02-19 | End: 2021-02-19

## 2021-02-19 RX ORDER — HYDROMORPHONE HYDROCHLORIDE 1 MG/ML
1 INJECTION, SOLUTION INTRAMUSCULAR; INTRAVENOUS; SUBCUTANEOUS
Status: COMPLETED | OUTPATIENT
Start: 2021-02-19 | End: 2021-02-19

## 2021-02-19 RX ADMIN — ONDANSETRON 4 MG: 4 TABLET, ORALLY DISINTEGRATING ORAL at 02:02

## 2021-02-19 RX ADMIN — HYDROMORPHONE HYDROCHLORIDE 1 MG: 1 INJECTION, SOLUTION INTRAMUSCULAR; INTRAVENOUS; SUBCUTANEOUS at 02:02

## 2021-02-22 ENCOUNTER — OFFICE VISIT (OUTPATIENT)
Dept: HEMATOLOGY/ONCOLOGY | Facility: CLINIC | Age: 51
End: 2021-02-22
Payer: MEDICAID

## 2021-02-22 ENCOUNTER — INFUSION (OUTPATIENT)
Dept: INFUSION THERAPY | Facility: HOSPITAL | Age: 51
End: 2021-02-22
Attending: INTERNAL MEDICINE
Payer: MEDICAID

## 2021-02-22 VITALS
WEIGHT: 217.38 LBS | RESPIRATION RATE: 16 BRPM | DIASTOLIC BLOOD PRESSURE: 72 MMHG | HEART RATE: 77 BPM | TEMPERATURE: 98 F | OXYGEN SATURATION: 96 % | HEIGHT: 67 IN | BODY MASS INDEX: 34.12 KG/M2 | SYSTOLIC BLOOD PRESSURE: 106 MMHG

## 2021-02-22 VITALS
WEIGHT: 217.38 LBS | DIASTOLIC BLOOD PRESSURE: 80 MMHG | SYSTOLIC BLOOD PRESSURE: 150 MMHG | OXYGEN SATURATION: 98 % | TEMPERATURE: 98 F | HEIGHT: 67 IN | HEART RATE: 91 BPM | BODY MASS INDEX: 34.12 KG/M2

## 2021-02-22 DIAGNOSIS — Z17.0 MALIGNANT NEOPLASM OF OVERLAPPING SITES OF RIGHT BREAST IN FEMALE, ESTROGEN RECEPTOR POSITIVE: ICD-10-CM

## 2021-02-22 DIAGNOSIS — C77.3 MALIGNANT NEOPLASM METASTATIC TO LYMPH NODE OF AXILLA: Primary | ICD-10-CM

## 2021-02-22 DIAGNOSIS — G89.3 NEOPLASM RELATED PAIN: ICD-10-CM

## 2021-02-22 DIAGNOSIS — C53.0 MALIGNANT NEOPLASM OF ENDOCERVIX: Primary | ICD-10-CM

## 2021-02-22 DIAGNOSIS — R45.89 ANXIETY ABOUT HEALTH: ICD-10-CM

## 2021-02-22 DIAGNOSIS — C79.51 SECONDARY CANCER OF BONE: ICD-10-CM

## 2021-02-22 DIAGNOSIS — C50.811 MALIGNANT NEOPLASM OF OVERLAPPING SITES OF RIGHT BREAST IN FEMALE, ESTROGEN RECEPTOR POSITIVE: ICD-10-CM

## 2021-02-22 DIAGNOSIS — K59.00 CONSTIPATION, UNSPECIFIED CONSTIPATION TYPE: ICD-10-CM

## 2021-02-22 DIAGNOSIS — C53.0 MALIGNANT NEOPLASM OF ENDOCERVIX: ICD-10-CM

## 2021-02-22 PROCEDURE — 96368 THER/DIAG CONCURRENT INF: CPT

## 2021-02-22 PROCEDURE — 25000003 PHARM REV CODE 250: Performed by: INTERNAL MEDICINE

## 2021-02-22 PROCEDURE — 96366 THER/PROPH/DIAG IV INF ADDON: CPT

## 2021-02-22 PROCEDURE — 99999 PR PBB SHADOW E&M-EST. PATIENT-LVL IV: CPT | Mod: PBBFAC,,, | Performed by: INTERNAL MEDICINE

## 2021-02-22 PROCEDURE — 99999 PR PBB SHADOW E&M-EST. PATIENT-LVL IV: ICD-10-PCS | Mod: PBBFAC,,, | Performed by: INTERNAL MEDICINE

## 2021-02-22 PROCEDURE — 96375 TX/PRO/DX INJ NEW DRUG ADDON: CPT

## 2021-02-22 PROCEDURE — 99215 OFFICE O/P EST HI 40 MIN: CPT | Mod: S$PBB,,, | Performed by: INTERNAL MEDICINE

## 2021-02-22 PROCEDURE — 96361 HYDRATE IV INFUSION ADD-ON: CPT

## 2021-02-22 PROCEDURE — 63600175 PHARM REV CODE 636 W HCPCS: Performed by: INTERNAL MEDICINE

## 2021-02-22 PROCEDURE — 99214 OFFICE O/P EST MOD 30 MIN: CPT | Mod: PBBFAC,25 | Performed by: INTERNAL MEDICINE

## 2021-02-22 PROCEDURE — 99215 PR OFFICE/OUTPT VISIT, EST, LEVL V, 40-54 MIN: ICD-10-PCS | Mod: S$PBB,,, | Performed by: INTERNAL MEDICINE

## 2021-02-22 PROCEDURE — 96415 CHEMO IV INFUSION ADDL HR: CPT

## 2021-02-22 PROCEDURE — 96413 CHEMO IV INFUSION 1 HR: CPT

## 2021-02-22 PROCEDURE — 96367 TX/PROPH/DG ADDL SEQ IV INF: CPT

## 2021-02-22 PROCEDURE — 96417 CHEMO IV INFUS EACH ADDL SEQ: CPT

## 2021-02-22 RX ORDER — SODIUM CHLORIDE 0.9 % (FLUSH) 0.9 %
10 SYRINGE (ML) INJECTION
Status: CANCELLED | OUTPATIENT
Start: 2021-02-22

## 2021-02-22 RX ORDER — SODIUM CHLORIDE 0.9 % (FLUSH) 0.9 %
10 SYRINGE (ML) INJECTION
Status: CANCELLED | OUTPATIENT
Start: 2021-03-01

## 2021-02-22 RX ORDER — HEPARIN 100 UNIT/ML
500 SYRINGE INTRAVENOUS
Status: CANCELLED | OUTPATIENT
Start: 2021-02-23

## 2021-02-22 RX ORDER — FAMOTIDINE 10 MG/ML
20 INJECTION INTRAVENOUS
Status: COMPLETED | OUTPATIENT
Start: 2021-02-22 | End: 2021-02-22

## 2021-02-22 RX ORDER — HEPARIN 100 UNIT/ML
500 SYRINGE INTRAVENOUS
Status: CANCELLED | OUTPATIENT
Start: 2021-03-01

## 2021-02-22 RX ORDER — FAMOTIDINE 10 MG/ML
20 INJECTION INTRAVENOUS
Status: CANCELLED | OUTPATIENT
Start: 2021-02-22

## 2021-02-22 RX ORDER — DIPHENHYDRAMINE HYDROCHLORIDE 50 MG/ML
50 INJECTION INTRAMUSCULAR; INTRAVENOUS ONCE AS NEEDED
Status: CANCELLED | OUTPATIENT
Start: 2021-02-22

## 2021-02-22 RX ORDER — SODIUM CHLORIDE 0.9 % (FLUSH) 0.9 %
10 SYRINGE (ML) INJECTION
Status: CANCELLED | OUTPATIENT
Start: 2021-02-23

## 2021-02-22 RX ORDER — EPINEPHRINE 1 MG/ML
0.3 INJECTION, SOLUTION INTRACARDIAC; INTRAMUSCULAR; INTRAVENOUS; SUBCUTANEOUS ONCE AS NEEDED
Status: DISCONTINUED | OUTPATIENT
Start: 2021-02-22 | End: 2021-02-22 | Stop reason: HOSPADM

## 2021-02-22 RX ORDER — HEPARIN 100 UNIT/ML
500 SYRINGE INTRAVENOUS
Status: CANCELLED | OUTPATIENT
Start: 2021-02-22

## 2021-02-22 RX ORDER — DIPHENHYDRAMINE HYDROCHLORIDE 50 MG/ML
50 INJECTION INTRAMUSCULAR; INTRAVENOUS ONCE AS NEEDED
Status: DISCONTINUED | OUTPATIENT
Start: 2021-02-22 | End: 2021-02-22 | Stop reason: HOSPADM

## 2021-02-22 RX ORDER — HEPARIN 100 UNIT/ML
500 SYRINGE INTRAVENOUS
Status: DISCONTINUED | OUTPATIENT
Start: 2021-02-22 | End: 2021-02-22 | Stop reason: HOSPADM

## 2021-02-22 RX ORDER — EPINEPHRINE 0.3 MG/.3ML
0.3 INJECTION SUBCUTANEOUS ONCE AS NEEDED
Status: CANCELLED | OUTPATIENT
Start: 2021-02-22

## 2021-02-22 RX ADMIN — POTASSIUM CHLORIDE: 2 INJECTION, SOLUTION, CONCENTRATE INTRAVENOUS at 09:02

## 2021-02-22 RX ADMIN — APREPITANT 130 MG: 130 INJECTION, EMULSION INTRAVENOUS at 12:02

## 2021-02-22 RX ADMIN — DIPHENHYDRAMINE HYDROCHLORIDE 50 MG: 50 INJECTION, SOLUTION INTRAMUSCULAR; INTRAVENOUS at 11:02

## 2021-02-22 RX ADMIN — FAMOTIDINE 20 MG: 10 INJECTION INTRAVENOUS at 11:02

## 2021-02-22 RX ADMIN — HEPARIN 500 UNITS: 100 SYRINGE at 05:02

## 2021-02-22 RX ADMIN — PACLITAXEL 378 MG: 6 INJECTION, SOLUTION INTRAVENOUS at 12:02

## 2021-02-22 RX ADMIN — PALONOSETRON HYDROCHLORIDE 0.25 MG: 0.25 INJECTION, SOLUTION INTRAVENOUS at 12:02

## 2021-02-22 RX ADMIN — CISPLATIN 108 MG: 100 INJECTION, SOLUTION INTRAVENOUS at 04:02

## 2021-02-23 ENCOUNTER — INFUSION (OUTPATIENT)
Dept: INFUSION THERAPY | Facility: HOSPITAL | Age: 51
End: 2021-02-23
Attending: INTERNAL MEDICINE
Payer: MEDICAID

## 2021-02-23 VITALS
HEIGHT: 67 IN | OXYGEN SATURATION: 99 % | TEMPERATURE: 98 F | WEIGHT: 217.38 LBS | SYSTOLIC BLOOD PRESSURE: 115 MMHG | BODY MASS INDEX: 34.12 KG/M2 | RESPIRATION RATE: 16 BRPM | DIASTOLIC BLOOD PRESSURE: 71 MMHG | HEART RATE: 83 BPM

## 2021-02-23 DIAGNOSIS — G89.3 CANCER ASSOCIATED PAIN: ICD-10-CM

## 2021-02-23 DIAGNOSIS — C53.0 MALIGNANT NEOPLASM OF ENDOCERVIX: Primary | ICD-10-CM

## 2021-02-23 DIAGNOSIS — F41.9 ANXIETY: ICD-10-CM

## 2021-02-23 PROCEDURE — 25000003 PHARM REV CODE 250: Performed by: INTERNAL MEDICINE

## 2021-02-23 PROCEDURE — 96415 CHEMO IV INFUSION ADDL HR: CPT

## 2021-02-23 PROCEDURE — 96413 CHEMO IV INFUSION 1 HR: CPT

## 2021-02-23 PROCEDURE — 63600175 PHARM REV CODE 636 W HCPCS: Mod: JG | Performed by: INTERNAL MEDICINE

## 2021-02-23 PROCEDURE — 96361 HYDRATE IV INFUSION ADD-ON: CPT

## 2021-02-23 RX ORDER — HEPARIN 100 UNIT/ML
500 SYRINGE INTRAVENOUS
Status: DISCONTINUED | OUTPATIENT
Start: 2021-02-23 | End: 2021-02-23 | Stop reason: HOSPADM

## 2021-02-23 RX ORDER — SODIUM CHLORIDE 9 MG/ML
1000 INJECTION, SOLUTION INTRAVENOUS
Status: COMPLETED | OUTPATIENT
Start: 2021-02-23 | End: 2021-02-23

## 2021-02-23 RX ADMIN — SODIUM CHLORIDE 1000 ML: 0.9 INJECTION, SOLUTION INTRAVENOUS at 01:02

## 2021-02-23 RX ADMIN — BEVACIZUMAB 1480 MG: 400 INJECTION, SOLUTION INTRAVENOUS at 02:02

## 2021-02-23 RX ADMIN — HEPARIN 500 UNITS: 100 SYRINGE at 03:02

## 2021-02-24 RX ORDER — MORPHINE SULFATE 15 MG/1
15 TABLET, FILM COATED, EXTENDED RELEASE ORAL EVERY 12 HOURS
Qty: 60 TABLET | Refills: 0 | Status: SHIPPED | OUTPATIENT
Start: 2021-02-24 | End: 2021-03-15 | Stop reason: SDUPTHER

## 2021-02-24 RX ORDER — ALPRAZOLAM 1 MG/1
.5-1 TABLET ORAL 2 TIMES DAILY PRN
Qty: 60 TABLET | Refills: 0 | Status: SHIPPED | OUTPATIENT
Start: 2021-02-24 | End: 2021-03-15 | Stop reason: SDUPTHER

## 2021-02-25 ENCOUNTER — OFFICE VISIT (OUTPATIENT)
Dept: PSYCHIATRY | Facility: CLINIC | Age: 51
End: 2021-02-25
Payer: MEDICAID

## 2021-02-25 ENCOUNTER — TELEPHONE (OUTPATIENT)
Dept: HEMATOLOGY/ONCOLOGY | Facility: CLINIC | Age: 51
End: 2021-02-25

## 2021-02-25 DIAGNOSIS — F51.04 PSYCHOPHYSIOLOGICAL INSOMNIA: ICD-10-CM

## 2021-02-25 DIAGNOSIS — F43.23 ADJUSTMENT DISORDER WITH MIXED ANXIETY AND DEPRESSED MOOD: Primary | ICD-10-CM

## 2021-02-25 LAB — FINAL PATHOLOGIC DIAGNOSIS: ABNORMAL

## 2021-02-25 PROCEDURE — 90791 PSYCH DIAGNOSTIC EVALUATION: CPT | Mod: 95,HP,HB, | Performed by: PSYCHOLOGIST

## 2021-02-25 PROCEDURE — 90791 PR PSYCHIATRIC DIAGNOSTIC EVALUATION: ICD-10-PCS | Mod: 95,HP,HB, | Performed by: PSYCHOLOGIST

## 2021-02-26 LAB
COMMENT: NORMAL
FINAL PATHOLOGIC DIAGNOSIS: NORMAL

## 2021-03-01 ENCOUNTER — TELEPHONE (OUTPATIENT)
Dept: HEMATOLOGY/ONCOLOGY | Facility: CLINIC | Age: 51
End: 2021-03-01

## 2021-03-02 ENCOUNTER — OFFICE VISIT (OUTPATIENT)
Dept: HEMATOLOGY/ONCOLOGY | Facility: CLINIC | Age: 51
End: 2021-03-02
Payer: MEDICAID

## 2021-03-02 ENCOUNTER — INFUSION (OUTPATIENT)
Dept: INFUSION THERAPY | Facility: HOSPITAL | Age: 51
End: 2021-03-02
Attending: INTERNAL MEDICINE
Payer: MEDICAID

## 2021-03-02 VITALS
DIASTOLIC BLOOD PRESSURE: 67 MMHG | TEMPERATURE: 98 F | SYSTOLIC BLOOD PRESSURE: 105 MMHG | OXYGEN SATURATION: 96 % | RESPIRATION RATE: 16 BRPM | HEART RATE: 73 BPM

## 2021-03-02 DIAGNOSIS — C77.3 MALIGNANT NEOPLASM METASTATIC TO LYMPH NODE OF AXILLA: ICD-10-CM

## 2021-03-02 DIAGNOSIS — C53.0 MALIGNANT NEOPLASM OF ENDOCERVIX: ICD-10-CM

## 2021-03-02 DIAGNOSIS — C50.811 MALIGNANT NEOPLASM OF OVERLAPPING SITES OF RIGHT BREAST IN FEMALE, ESTROGEN RECEPTOR POSITIVE: ICD-10-CM

## 2021-03-02 DIAGNOSIS — E87.6 HYPOKALEMIA DUE TO EXCESSIVE GASTROINTESTINAL LOSS OF POTASSIUM: ICD-10-CM

## 2021-03-02 DIAGNOSIS — C79.51 SECONDARY CANCER OF BONE: ICD-10-CM

## 2021-03-02 DIAGNOSIS — E86.0 DEHYDRATION: ICD-10-CM

## 2021-03-02 DIAGNOSIS — C53.0 MALIGNANT NEOPLASM OF ENDOCERVIX: Primary | ICD-10-CM

## 2021-03-02 DIAGNOSIS — R11.0 NAUSEA: Primary | ICD-10-CM

## 2021-03-02 DIAGNOSIS — R19.7 DIARRHEA, UNSPECIFIED TYPE: ICD-10-CM

## 2021-03-02 DIAGNOSIS — Z17.0 MALIGNANT NEOPLASM OF OVERLAPPING SITES OF RIGHT BREAST IN FEMALE, ESTROGEN RECEPTOR POSITIVE: ICD-10-CM

## 2021-03-02 PROCEDURE — 99215 PR OFFICE/OUTPT VISIT, EST, LEVL V, 40-54 MIN: ICD-10-PCS | Mod: S$PBB,,, | Performed by: INTERNAL MEDICINE

## 2021-03-02 PROCEDURE — 99999 PR PBB SHADOW E&M-EST. PATIENT-LVL I: ICD-10-PCS | Mod: PBBFAC,,, | Performed by: INTERNAL MEDICINE

## 2021-03-02 PROCEDURE — 99211 OFF/OP EST MAY X REQ PHY/QHP: CPT | Mod: PBBFAC,25 | Performed by: INTERNAL MEDICINE

## 2021-03-02 PROCEDURE — 96360 HYDRATION IV INFUSION INIT: CPT

## 2021-03-02 PROCEDURE — 99215 OFFICE O/P EST HI 40 MIN: CPT | Mod: S$PBB,,, | Performed by: INTERNAL MEDICINE

## 2021-03-02 PROCEDURE — 63600175 PHARM REV CODE 636 W HCPCS: Performed by: INTERNAL MEDICINE

## 2021-03-02 PROCEDURE — 99999 PR PBB SHADOW E&M-EST. PATIENT-LVL I: CPT | Mod: PBBFAC,,, | Performed by: INTERNAL MEDICINE

## 2021-03-02 PROCEDURE — 25000003 PHARM REV CODE 250: Performed by: INTERNAL MEDICINE

## 2021-03-02 RX ORDER — HEPARIN 100 UNIT/ML
5 SYRINGE INTRAVENOUS
Status: COMPLETED | OUTPATIENT
Start: 2021-03-02 | End: 2021-03-02

## 2021-03-02 RX ORDER — DIPHENOXYLATE HYDROCHLORIDE AND ATROPINE SULFATE 2.5; .025 MG/1; MG/1
1 TABLET ORAL 4 TIMES DAILY PRN
Qty: 30 TABLET | Refills: 1 | Status: SHIPPED | OUTPATIENT
Start: 2021-03-02 | End: 2021-04-18 | Stop reason: SDUPTHER

## 2021-03-02 RX ORDER — POTASSIUM CHLORIDE 20 MEQ/1
20 TABLET, EXTENDED RELEASE ORAL DAILY
Qty: 3 TABLET | Refills: 1 | Status: SHIPPED | OUTPATIENT
Start: 2021-03-02

## 2021-03-02 RX ORDER — OLANZAPINE 5 MG/1
5 TABLET ORAL NIGHTLY
Qty: 12 TABLET | Refills: 2 | Status: SHIPPED | OUTPATIENT
Start: 2021-03-02 | End: 2021-04-08 | Stop reason: SDUPTHER

## 2021-03-02 RX ADMIN — HEPARIN 500 UNITS: 100 SYRINGE at 05:03

## 2021-03-02 RX ADMIN — SODIUM CHLORIDE 1000 ML: 0.9 INJECTION, SOLUTION INTRAVENOUS at 04:03

## 2021-03-03 ENCOUNTER — OFFICE VISIT (OUTPATIENT)
Dept: PALLIATIVE MEDICINE | Facility: CLINIC | Age: 51
End: 2021-03-03
Payer: MEDICAID

## 2021-03-03 DIAGNOSIS — F41.9 ANXIETY: ICD-10-CM

## 2021-03-03 DIAGNOSIS — G89.3 NEOPLASM RELATED PAIN: ICD-10-CM

## 2021-03-03 DIAGNOSIS — R11.0 CHEMOTHERAPY-INDUCED NAUSEA: ICD-10-CM

## 2021-03-03 DIAGNOSIS — T45.1X5A CHEMOTHERAPY-INDUCED NAUSEA: ICD-10-CM

## 2021-03-03 PROCEDURE — 99215 PR OFFICE/OUTPT VISIT, EST, LEVL V, 40-54 MIN: ICD-10-PCS | Mod: 95,,, | Performed by: FAMILY MEDICINE

## 2021-03-03 PROCEDURE — 99215 OFFICE O/P EST HI 40 MIN: CPT | Mod: 95,,, | Performed by: FAMILY MEDICINE

## 2021-03-04 ENCOUNTER — TELEPHONE (OUTPATIENT)
Dept: HEMATOLOGY/ONCOLOGY | Facility: CLINIC | Age: 51
End: 2021-03-04

## 2021-03-05 PROBLEM — R11.0 CHEMOTHERAPY-INDUCED NAUSEA: Status: ACTIVE | Noted: 2021-03-05

## 2021-03-05 PROBLEM — T45.1X5A CHEMOTHERAPY-INDUCED NAUSEA: Status: ACTIVE | Noted: 2021-03-05

## 2021-03-07 DIAGNOSIS — G89.3 CANCER ASSOCIATED PAIN: ICD-10-CM

## 2021-03-08 ENCOUNTER — TELEPHONE (OUTPATIENT)
Dept: HEMATOLOGY/ONCOLOGY | Facility: CLINIC | Age: 51
End: 2021-03-08

## 2021-03-08 ENCOUNTER — OFFICE VISIT (OUTPATIENT)
Dept: PULMONOLOGY | Facility: CLINIC | Age: 51
End: 2021-03-08
Payer: MEDICAID

## 2021-03-08 ENCOUNTER — SPECIALTY PHARMACY (OUTPATIENT)
Dept: PHARMACY | Facility: CLINIC | Age: 51
End: 2021-03-08

## 2021-03-08 ENCOUNTER — PATIENT MESSAGE (OUTPATIENT)
Dept: PHARMACY | Facility: CLINIC | Age: 51
End: 2021-03-08

## 2021-03-08 ENCOUNTER — SOCIAL WORK (OUTPATIENT)
Dept: HEMATOLOGY/ONCOLOGY | Facility: CLINIC | Age: 51
End: 2021-03-08

## 2021-03-08 VITALS
RESPIRATION RATE: 18 BRPM | HEART RATE: 85 BPM | DIASTOLIC BLOOD PRESSURE: 76 MMHG | OXYGEN SATURATION: 99 % | TEMPERATURE: 98 F | BODY MASS INDEX: 33.17 KG/M2 | WEIGHT: 211.31 LBS | SYSTOLIC BLOOD PRESSURE: 128 MMHG | HEIGHT: 67 IN

## 2021-03-08 DIAGNOSIS — J43.1 PANLOBULAR EMPHYSEMA: ICD-10-CM

## 2021-03-08 DIAGNOSIS — J44.89 COPD WITH ASTHMA: Primary | ICD-10-CM

## 2021-03-08 DIAGNOSIS — J84.10 CALCIFIED GRANULOMA OF LUNG: ICD-10-CM

## 2021-03-08 DIAGNOSIS — C78.00 MALIGNANT NEOPLASM OF CERVIX METASTATIC TO LUNG: ICD-10-CM

## 2021-03-08 DIAGNOSIS — C53.9 MALIGNANT NEOPLASM OF CERVIX METASTATIC TO LUNG: ICD-10-CM

## 2021-03-08 DIAGNOSIS — F17.210 TOBACCO DEPENDENCE DUE TO CIGARETTES: ICD-10-CM

## 2021-03-08 PROCEDURE — 99999 PR PBB SHADOW E&M-EST. PATIENT-LVL V: CPT | Mod: PBBFAC,,, | Performed by: INTERNAL MEDICINE

## 2021-03-08 PROCEDURE — 99214 OFFICE O/P EST MOD 30 MIN: CPT | Mod: S$PBB,,, | Performed by: INTERNAL MEDICINE

## 2021-03-08 PROCEDURE — 99999 PR PBB SHADOW E&M-EST. PATIENT-LVL V: ICD-10-PCS | Mod: PBBFAC,,, | Performed by: INTERNAL MEDICINE

## 2021-03-08 PROCEDURE — 99214 PR OFFICE/OUTPT VISIT, EST, LEVL IV, 30-39 MIN: ICD-10-PCS | Mod: S$PBB,,, | Performed by: INTERNAL MEDICINE

## 2021-03-08 PROCEDURE — 99215 OFFICE O/P EST HI 40 MIN: CPT | Mod: PBBFAC | Performed by: INTERNAL MEDICINE

## 2021-03-08 RX ORDER — HYDROCODONE BITARTRATE AND ACETAMINOPHEN 10; 325 MG/1; MG/1
1 TABLET ORAL EVERY 4 HOURS PRN
Qty: 120 TABLET | Refills: 0 | Status: SHIPPED | OUTPATIENT
Start: 2021-03-08 | End: 2021-03-25 | Stop reason: SDUPTHER

## 2021-03-11 NOTE — ED NOTES
Patient placed on continuous cardiac monitor, automatic blood pressure cuff and continuous pulse oximeter.   6

## 2021-03-15 ENCOUNTER — INFUSION (OUTPATIENT)
Dept: INFUSION THERAPY | Facility: HOSPITAL | Age: 51
End: 2021-03-15
Attending: INTERNAL MEDICINE
Payer: MEDICAID

## 2021-03-15 ENCOUNTER — SOCIAL WORK (OUTPATIENT)
Dept: HEMATOLOGY/ONCOLOGY | Facility: CLINIC | Age: 51
End: 2021-03-15

## 2021-03-15 ENCOUNTER — OFFICE VISIT (OUTPATIENT)
Dept: HEMATOLOGY/ONCOLOGY | Facility: CLINIC | Age: 51
End: 2021-03-15
Payer: MEDICAID

## 2021-03-15 VITALS
TEMPERATURE: 98 F | WEIGHT: 209.44 LBS | OXYGEN SATURATION: 99 % | SYSTOLIC BLOOD PRESSURE: 115 MMHG | RESPIRATION RATE: 16 BRPM | BODY MASS INDEX: 32.87 KG/M2 | HEART RATE: 74 BPM | HEIGHT: 67 IN | DIASTOLIC BLOOD PRESSURE: 76 MMHG

## 2021-03-15 VITALS
BODY MASS INDEX: 32.87 KG/M2 | WEIGHT: 209.44 LBS | TEMPERATURE: 98 F | HEART RATE: 87 BPM | OXYGEN SATURATION: 98 % | SYSTOLIC BLOOD PRESSURE: 99 MMHG | DIASTOLIC BLOOD PRESSURE: 65 MMHG | HEIGHT: 67 IN

## 2021-03-15 DIAGNOSIS — C50.811 MALIGNANT NEOPLASM OF OVERLAPPING SITES OF RIGHT BREAST IN FEMALE, ESTROGEN RECEPTOR POSITIVE: ICD-10-CM

## 2021-03-15 DIAGNOSIS — C77.3 MALIGNANT NEOPLASM METASTATIC TO LYMPH NODE OF AXILLA: Primary | ICD-10-CM

## 2021-03-15 DIAGNOSIS — C53.0 MALIGNANT NEOPLASM OF ENDOCERVIX: ICD-10-CM

## 2021-03-15 DIAGNOSIS — C79.51 SECONDARY CANCER OF BONE: ICD-10-CM

## 2021-03-15 DIAGNOSIS — C53.0 MALIGNANT NEOPLASM OF ENDOCERVIX: Primary | ICD-10-CM

## 2021-03-15 DIAGNOSIS — R11.0 NAUSEA: ICD-10-CM

## 2021-03-15 DIAGNOSIS — F41.9 ANXIETY: ICD-10-CM

## 2021-03-15 DIAGNOSIS — G89.3 CANCER ASSOCIATED PAIN: ICD-10-CM

## 2021-03-15 DIAGNOSIS — Z17.0 MALIGNANT NEOPLASM OF OVERLAPPING SITES OF RIGHT BREAST IN FEMALE, ESTROGEN RECEPTOR POSITIVE: ICD-10-CM

## 2021-03-15 PROCEDURE — 99214 OFFICE O/P EST MOD 30 MIN: CPT | Mod: PBBFAC,25 | Performed by: INTERNAL MEDICINE

## 2021-03-15 PROCEDURE — 96366 THER/PROPH/DIAG IV INF ADDON: CPT

## 2021-03-15 PROCEDURE — 99215 PR OFFICE/OUTPT VISIT, EST, LEVL V, 40-54 MIN: ICD-10-PCS | Mod: S$PBB,,, | Performed by: INTERNAL MEDICINE

## 2021-03-15 PROCEDURE — 99999 PR PBB SHADOW E&M-EST. PATIENT-LVL IV: ICD-10-PCS | Mod: PBBFAC,,, | Performed by: INTERNAL MEDICINE

## 2021-03-15 PROCEDURE — 96367 TX/PROPH/DG ADDL SEQ IV INF: CPT

## 2021-03-15 PROCEDURE — 96415 CHEMO IV INFUSION ADDL HR: CPT

## 2021-03-15 PROCEDURE — 96417 CHEMO IV INFUS EACH ADDL SEQ: CPT

## 2021-03-15 PROCEDURE — 96375 TX/PRO/DX INJ NEW DRUG ADDON: CPT

## 2021-03-15 PROCEDURE — 99215 OFFICE O/P EST HI 40 MIN: CPT | Mod: S$PBB,,, | Performed by: INTERNAL MEDICINE

## 2021-03-15 PROCEDURE — 99999 PR PBB SHADOW E&M-EST. PATIENT-LVL IV: CPT | Mod: PBBFAC,,, | Performed by: INTERNAL MEDICINE

## 2021-03-15 PROCEDURE — 25000003 PHARM REV CODE 250: Performed by: INTERNAL MEDICINE

## 2021-03-15 PROCEDURE — 63600175 PHARM REV CODE 636 W HCPCS: Performed by: INTERNAL MEDICINE

## 2021-03-15 PROCEDURE — 96413 CHEMO IV INFUSION 1 HR: CPT

## 2021-03-15 RX ORDER — SODIUM CHLORIDE 0.9 % (FLUSH) 0.9 %
10 SYRINGE (ML) INJECTION
Status: CANCELLED | OUTPATIENT
Start: 2021-03-15

## 2021-03-15 RX ORDER — MORPHINE SULFATE 15 MG/1
15 TABLET, FILM COATED, EXTENDED RELEASE ORAL EVERY 12 HOURS
Qty: 60 TABLET | Refills: 0 | Status: SHIPPED | OUTPATIENT
Start: 2021-03-15 | End: 2021-04-20 | Stop reason: SDUPTHER

## 2021-03-15 RX ORDER — HEPARIN 100 UNIT/ML
500 SYRINGE INTRAVENOUS
Status: CANCELLED | OUTPATIENT
Start: 2021-03-22

## 2021-03-15 RX ORDER — ALPRAZOLAM 1 MG/1
.5-1 TABLET ORAL 2 TIMES DAILY PRN
Qty: 60 TABLET | Refills: 0 | Status: SHIPPED | OUTPATIENT
Start: 2021-03-15 | End: 2021-04-20 | Stop reason: SDUPTHER

## 2021-03-15 RX ORDER — PROCHLORPERAZINE MALEATE 5 MG
5 TABLET ORAL EVERY 6 HOURS PRN
Qty: 30 TABLET | Refills: 1 | Status: SHIPPED | OUTPATIENT
Start: 2021-03-15 | End: 2022-03-15

## 2021-03-15 RX ORDER — HEPARIN 100 UNIT/ML
500 SYRINGE INTRAVENOUS
Status: CANCELLED | OUTPATIENT
Start: 2021-03-16

## 2021-03-15 RX ORDER — HEPARIN 100 UNIT/ML
500 SYRINGE INTRAVENOUS
Status: DISCONTINUED | OUTPATIENT
Start: 2021-03-15 | End: 2021-03-15 | Stop reason: HOSPADM

## 2021-03-15 RX ORDER — FAMOTIDINE 10 MG/ML
20 INJECTION INTRAVENOUS
Status: COMPLETED | OUTPATIENT
Start: 2021-03-15 | End: 2021-03-15

## 2021-03-15 RX ORDER — FAMOTIDINE 10 MG/ML
20 INJECTION INTRAVENOUS
Status: CANCELLED | OUTPATIENT
Start: 2021-03-15

## 2021-03-15 RX ORDER — EPINEPHRINE 0.3 MG/.3ML
0.3 INJECTION SUBCUTANEOUS ONCE AS NEEDED
Status: CANCELLED | OUTPATIENT
Start: 2021-03-15

## 2021-03-15 RX ORDER — SODIUM CHLORIDE 0.9 % (FLUSH) 0.9 %
10 SYRINGE (ML) INJECTION
Status: CANCELLED | OUTPATIENT
Start: 2021-03-16

## 2021-03-15 RX ORDER — HEPARIN 100 UNIT/ML
500 SYRINGE INTRAVENOUS
Status: CANCELLED | OUTPATIENT
Start: 2021-03-15

## 2021-03-15 RX ORDER — DIPHENHYDRAMINE HYDROCHLORIDE 50 MG/ML
50 INJECTION INTRAMUSCULAR; INTRAVENOUS ONCE AS NEEDED
Status: CANCELLED | OUTPATIENT
Start: 2021-03-15

## 2021-03-15 RX ORDER — SODIUM CHLORIDE 0.9 % (FLUSH) 0.9 %
10 SYRINGE (ML) INJECTION
Status: CANCELLED | OUTPATIENT
Start: 2021-03-22

## 2021-03-15 RX ADMIN — APREPITANT 130 MG: 130 INJECTION, EMULSION INTRAVENOUS at 12:03

## 2021-03-15 RX ADMIN — PALONOSETRON HYDROCHLORIDE 0.25 MG: 0.25 INJECTION, SOLUTION INTRAVENOUS at 11:03

## 2021-03-15 RX ADMIN — DIPHENHYDRAMINE HYDROCHLORIDE 50 MG: 50 INJECTION, SOLUTION INTRAMUSCULAR; INTRAVENOUS at 11:03

## 2021-03-15 RX ADMIN — CISPLATIN 108 MG: 100 INJECTION, SOLUTION INTRAVENOUS at 04:03

## 2021-03-15 RX ADMIN — FAMOTIDINE 20 MG: 10 INJECTION INTRAVENOUS at 12:03

## 2021-03-15 RX ADMIN — HEPARIN 500 UNITS: 100 SYRINGE at 05:03

## 2021-03-15 RX ADMIN — POTASSIUM CHLORIDE: 2 INJECTION, SOLUTION, CONCENTRATE INTRAVENOUS at 09:03

## 2021-03-15 RX ADMIN — PACLITAXEL 378 MG: 6 INJECTION, SOLUTION INTRAVENOUS at 12:03

## 2021-03-16 ENCOUNTER — INFUSION (OUTPATIENT)
Dept: INFUSION THERAPY | Facility: HOSPITAL | Age: 51
End: 2021-03-16
Attending: INTERNAL MEDICINE
Payer: MEDICAID

## 2021-03-16 VITALS
DIASTOLIC BLOOD PRESSURE: 74 MMHG | TEMPERATURE: 98 F | SYSTOLIC BLOOD PRESSURE: 116 MMHG | BODY MASS INDEX: 32.87 KG/M2 | OXYGEN SATURATION: 98 % | HEART RATE: 88 BPM | RESPIRATION RATE: 16 BRPM | HEIGHT: 67 IN | WEIGHT: 209.44 LBS

## 2021-03-16 DIAGNOSIS — C50.811 MALIGNANT NEOPLASM OF OVERLAPPING SITES OF RIGHT BREAST IN FEMALE, ESTROGEN RECEPTOR POSITIVE: ICD-10-CM

## 2021-03-16 DIAGNOSIS — Z17.0 MALIGNANT NEOPLASM OF OVERLAPPING SITES OF RIGHT BREAST IN FEMALE, ESTROGEN RECEPTOR POSITIVE: ICD-10-CM

## 2021-03-16 DIAGNOSIS — C79.51 SECONDARY CANCER OF BONE: ICD-10-CM

## 2021-03-16 DIAGNOSIS — C53.0 MALIGNANT NEOPLASM OF ENDOCERVIX: Primary | ICD-10-CM

## 2021-03-16 PROCEDURE — 96413 CHEMO IV INFUSION 1 HR: CPT

## 2021-03-16 PROCEDURE — 96361 HYDRATE IV INFUSION ADD-ON: CPT

## 2021-03-16 PROCEDURE — 63600175 PHARM REV CODE 636 W HCPCS: Performed by: INTERNAL MEDICINE

## 2021-03-16 PROCEDURE — 25000003 PHARM REV CODE 250: Performed by: INTERNAL MEDICINE

## 2021-03-16 PROCEDURE — 96375 TX/PRO/DX INJ NEW DRUG ADDON: CPT

## 2021-03-16 RX ORDER — SODIUM CHLORIDE 9 MG/ML
1000 INJECTION, SOLUTION INTRAVENOUS
Status: COMPLETED | OUTPATIENT
Start: 2021-03-16 | End: 2021-03-16

## 2021-03-16 RX ORDER — HEPARIN 100 UNIT/ML
500 SYRINGE INTRAVENOUS
Status: DISCONTINUED | OUTPATIENT
Start: 2021-03-16 | End: 2021-03-16 | Stop reason: HOSPADM

## 2021-03-16 RX ORDER — ZOLEDRONIC ACID 0.04 MG/ML
4 INJECTION, SOLUTION INTRAVENOUS
Status: COMPLETED | OUTPATIENT
Start: 2021-03-16 | End: 2021-03-16

## 2021-03-16 RX ADMIN — HEPARIN 500 UNITS: 100 SYRINGE at 03:03

## 2021-03-16 RX ADMIN — BEVACIZUMAB 1480 MG: 400 INJECTION, SOLUTION INTRAVENOUS at 01:03

## 2021-03-16 RX ADMIN — SODIUM CHLORIDE 1000 ML: 0.9 INJECTION, SOLUTION INTRAVENOUS at 01:03

## 2021-03-16 RX ADMIN — ZOLEDRONIC ACID 4 MG: 0.04 INJECTION, SOLUTION INTRAVENOUS at 01:03

## 2021-03-21 ENCOUNTER — HOSPITAL ENCOUNTER (EMERGENCY)
Facility: HOSPITAL | Age: 51
Discharge: HOME | End: 2021-03-21
Attending: EMERGENCY MEDICINE
Payer: MEDICAID

## 2021-03-21 VITALS
SYSTOLIC BLOOD PRESSURE: 124 MMHG | TEMPERATURE: 97 F | HEIGHT: 67 IN | DIASTOLIC BLOOD PRESSURE: 76 MMHG | OXYGEN SATURATION: 97 % | RESPIRATION RATE: 19 BRPM | BODY MASS INDEX: 32.8 KG/M2 | HEART RATE: 83 BPM

## 2021-03-21 DIAGNOSIS — R53.1 WEAKNESS: ICD-10-CM

## 2021-03-21 LAB
ALBUMIN SERPL BCP-MCNC: 3.7 G/DL (ref 3.5–5.2)
ALP SERPL-CCNC: 85 U/L (ref 55–135)
ALT SERPL W/O P-5'-P-CCNC: 73 U/L (ref 10–44)
ANION GAP SERPL CALC-SCNC: 13 MMOL/L (ref 8–16)
AST SERPL-CCNC: 29 U/L (ref 10–40)
BASOPHILS # BLD AUTO: 0.04 K/UL (ref 0–0.2)
BASOPHILS NFR BLD: 0.8 % (ref 0–1.9)
BILIRUB SERPL-MCNC: 0.8 MG/DL (ref 0.1–1)
BUN SERPL-MCNC: 31 MG/DL (ref 6–20)
CALCIUM SERPL-MCNC: 8.5 MG/DL (ref 8.7–10.5)
CHLORIDE SERPL-SCNC: 100 MMOL/L (ref 95–110)
CO2 SERPL-SCNC: 23 MMOL/L (ref 23–29)
CREAT SERPL-MCNC: 1.1 MG/DL (ref 0.5–1.4)
DIFFERENTIAL METHOD: ABNORMAL
EOSINOPHIL # BLD AUTO: 0.4 K/UL (ref 0–0.5)
EOSINOPHIL NFR BLD: 8.5 % (ref 0–8)
ERYTHROCYTE [DISTWIDTH] IN BLOOD BY AUTOMATED COUNT: 13.9 % (ref 11.5–14.5)
EST. GFR  (AFRICAN AMERICAN): >60 ML/MIN/1.73 M^2
EST. GFR  (NON AFRICAN AMERICAN): 58 ML/MIN/1.73 M^2
GLUCOSE SERPL-MCNC: 140 MG/DL (ref 70–110)
HCT VFR BLD AUTO: 38.3 % (ref 37–48.5)
HGB BLD-MCNC: 13.2 G/DL (ref 12–16)
IMM GRANULOCYTES # BLD AUTO: 0.03 K/UL (ref 0–0.04)
IMM GRANULOCYTES NFR BLD AUTO: 0.6 % (ref 0–0.5)
LIPASE SERPL-CCNC: 33 U/L (ref 4–60)
LYMPHOCYTES # BLD AUTO: 1.4 K/UL (ref 1–4.8)
LYMPHOCYTES NFR BLD: 28.7 % (ref 18–48)
MCH RBC QN AUTO: 34.3 PG (ref 27–31)
MCHC RBC AUTO-ENTMCNC: 34.5 G/DL (ref 32–36)
MCV RBC AUTO: 100 FL (ref 82–98)
MONOCYTES # BLD AUTO: 0.1 K/UL (ref 0.3–1)
MONOCYTES NFR BLD: 1.9 % (ref 4–15)
NEUTROPHILS # BLD AUTO: 2.9 K/UL (ref 1.8–7.7)
NEUTROPHILS NFR BLD: 59.5 % (ref 38–73)
NRBC BLD-RTO: 0 /100 WBC
PLATELET # BLD AUTO: 185 K/UL (ref 150–350)
PLATELET BLD QL SMEAR: ABNORMAL
PMV BLD AUTO: 12.4 FL (ref 9.2–12.9)
POTASSIUM SERPL-SCNC: 3.6 MMOL/L (ref 3.5–5.1)
PROT SERPL-MCNC: 7.2 G/DL (ref 6–8.4)
RBC # BLD AUTO: 3.85 M/UL (ref 4–5.4)
SODIUM SERPL-SCNC: 136 MMOL/L (ref 136–145)
TROPONIN I SERPL DL<=0.01 NG/ML-MCNC: <0.006 NG/ML (ref 0–0.03)
WBC # BLD AUTO: 4.85 K/UL (ref 3.9–12.7)

## 2021-03-21 PROCEDURE — 93010 EKG 12-LEAD: ICD-10-PCS | Mod: ,,, | Performed by: INTERNAL MEDICINE

## 2021-03-21 PROCEDURE — 63600175 PHARM REV CODE 636 W HCPCS: Performed by: FAMILY MEDICINE

## 2021-03-21 PROCEDURE — 96361 HYDRATE IV INFUSION ADD-ON: CPT

## 2021-03-21 PROCEDURE — 84484 ASSAY OF TROPONIN QUANT: CPT | Performed by: EMERGENCY MEDICINE

## 2021-03-21 PROCEDURE — 63600175 PHARM REV CODE 636 W HCPCS: Performed by: EMERGENCY MEDICINE

## 2021-03-21 PROCEDURE — 85025 COMPLETE CBC W/AUTO DIFF WBC: CPT | Performed by: EMERGENCY MEDICINE

## 2021-03-21 PROCEDURE — 96375 TX/PRO/DX INJ NEW DRUG ADDON: CPT

## 2021-03-21 PROCEDURE — 83690 ASSAY OF LIPASE: CPT | Performed by: EMERGENCY MEDICINE

## 2021-03-21 PROCEDURE — 80053 COMPREHEN METABOLIC PANEL: CPT | Performed by: EMERGENCY MEDICINE

## 2021-03-21 PROCEDURE — 99284 EMERGENCY DEPT VISIT MOD MDM: CPT | Mod: 25

## 2021-03-21 PROCEDURE — 93010 ELECTROCARDIOGRAM REPORT: CPT | Mod: ,,, | Performed by: INTERNAL MEDICINE

## 2021-03-21 PROCEDURE — 96374 THER/PROPH/DIAG INJ IV PUSH: CPT

## 2021-03-21 PROCEDURE — 93005 ELECTROCARDIOGRAM TRACING: CPT

## 2021-03-21 PROCEDURE — 25000003 PHARM REV CODE 250: Performed by: FAMILY MEDICINE

## 2021-03-21 RX ORDER — DIPHENHYDRAMINE HYDROCHLORIDE 50 MG/ML
25 INJECTION INTRAMUSCULAR; INTRAVENOUS
Status: COMPLETED | OUTPATIENT
Start: 2021-03-21 | End: 2021-03-21

## 2021-03-21 RX ORDER — ONDANSETRON 2 MG/ML
8 INJECTION INTRAMUSCULAR; INTRAVENOUS
Status: COMPLETED | OUTPATIENT
Start: 2021-03-21 | End: 2021-03-21

## 2021-03-21 RX ADMIN — ONDANSETRON 8 MG: 2 INJECTION INTRAMUSCULAR; INTRAVENOUS at 04:03

## 2021-03-21 RX ADMIN — SODIUM CHLORIDE 1000 ML: 0.9 INJECTION, SOLUTION INTRAVENOUS at 05:03

## 2021-03-21 RX ADMIN — DIPHENHYDRAMINE HYDROCHLORIDE 25 MG: 50 INJECTION, SOLUTION INTRAMUSCULAR; INTRAVENOUS at 05:03

## 2021-03-22 ENCOUNTER — OFFICE VISIT (OUTPATIENT)
Dept: HEMATOLOGY/ONCOLOGY | Facility: CLINIC | Age: 51
End: 2021-03-22
Payer: MEDICAID

## 2021-03-22 ENCOUNTER — INFUSION (OUTPATIENT)
Dept: INFUSION THERAPY | Facility: HOSPITAL | Age: 51
End: 2021-03-22
Attending: INTERNAL MEDICINE
Payer: MEDICAID

## 2021-03-22 VITALS
WEIGHT: 206.13 LBS | HEIGHT: 67 IN | BODY MASS INDEX: 32.35 KG/M2 | DIASTOLIC BLOOD PRESSURE: 91 MMHG | HEART RATE: 109 BPM | OXYGEN SATURATION: 99 % | TEMPERATURE: 97 F | SYSTOLIC BLOOD PRESSURE: 129 MMHG

## 2021-03-22 VITALS
HEART RATE: 78 BPM | RESPIRATION RATE: 16 BRPM | SYSTOLIC BLOOD PRESSURE: 114 MMHG | OXYGEN SATURATION: 98 % | TEMPERATURE: 98 F | DIASTOLIC BLOOD PRESSURE: 78 MMHG

## 2021-03-22 DIAGNOSIS — T45.1X5A CHEMOTHERAPY-INDUCED NAUSEA: ICD-10-CM

## 2021-03-22 DIAGNOSIS — C53.0 MALIGNANT NEOPLASM OF ENDOCERVIX: Primary | ICD-10-CM

## 2021-03-22 DIAGNOSIS — C53.0 MALIGNANT NEOPLASM OF ENDOCERVIX: ICD-10-CM

## 2021-03-22 DIAGNOSIS — R11.0 CHEMOTHERAPY-INDUCED NAUSEA: ICD-10-CM

## 2021-03-22 PROCEDURE — 96360 HYDRATION IV INFUSION INIT: CPT

## 2021-03-22 PROCEDURE — 63600175 PHARM REV CODE 636 W HCPCS: Performed by: INTERNAL MEDICINE

## 2021-03-22 PROCEDURE — 99214 PR OFFICE/OUTPT VISIT, EST, LEVL IV, 30-39 MIN: ICD-10-PCS | Mod: 25,S$PBB,, | Performed by: NURSE PRACTITIONER

## 2021-03-22 PROCEDURE — 99999 PR PBB SHADOW E&M-EST. PATIENT-LVL IV: ICD-10-PCS | Mod: PBBFAC,,, | Performed by: NURSE PRACTITIONER

## 2021-03-22 PROCEDURE — 25000003 PHARM REV CODE 250: Performed by: INTERNAL MEDICINE

## 2021-03-22 PROCEDURE — 99999 PR PBB SHADOW E&M-EST. PATIENT-LVL IV: CPT | Mod: PBBFAC,,, | Performed by: NURSE PRACTITIONER

## 2021-03-22 PROCEDURE — 96361 HYDRATE IV INFUSION ADD-ON: CPT

## 2021-03-22 PROCEDURE — 99214 OFFICE O/P EST MOD 30 MIN: CPT | Mod: PBBFAC,25 | Performed by: NURSE PRACTITIONER

## 2021-03-22 PROCEDURE — 96365 THER/PROPH/DIAG IV INF INIT: CPT

## 2021-03-22 PROCEDURE — 96367 TX/PROPH/DG ADDL SEQ IV INF: CPT

## 2021-03-22 PROCEDURE — 63600175 PHARM REV CODE 636 W HCPCS: Performed by: NURSE PRACTITIONER

## 2021-03-22 PROCEDURE — 99214 OFFICE O/P EST MOD 30 MIN: CPT | Mod: 25,S$PBB,, | Performed by: NURSE PRACTITIONER

## 2021-03-22 PROCEDURE — 25000003 PHARM REV CODE 250: Performed by: NURSE PRACTITIONER

## 2021-03-22 RX ORDER — HYDROXYZINE HYDROCHLORIDE 25 MG/1
25 TABLET, FILM COATED ORAL NIGHTLY PRN
Qty: 30 TABLET | Refills: 0 | Status: SHIPPED | OUTPATIENT
Start: 2021-03-22 | End: 2021-05-15

## 2021-03-22 RX ORDER — HEPARIN 100 UNIT/ML
500 SYRINGE INTRAVENOUS
Status: DISCONTINUED | OUTPATIENT
Start: 2021-03-22 | End: 2021-03-22 | Stop reason: HOSPADM

## 2021-03-22 RX ORDER — SODIUM CHLORIDE 0.9 % (FLUSH) 0.9 %
10 SYRINGE (ML) INJECTION
Status: CANCELLED | OUTPATIENT
Start: 2021-03-22

## 2021-03-22 RX ORDER — PROMETHAZINE HYDROCHLORIDE 25 MG/ML
12.5 INJECTION, SOLUTION INTRAMUSCULAR; INTRAVENOUS ONCE
Status: CANCELLED
Start: 2021-03-22 | End: 2021-03-22

## 2021-03-22 RX ORDER — PROMETHAZINE HYDROCHLORIDE 25 MG/ML
12.5 INJECTION, SOLUTION INTRAMUSCULAR; INTRAVENOUS ONCE
Status: DISCONTINUED | OUTPATIENT
Start: 2021-03-22 | End: 2021-03-22

## 2021-03-22 RX ORDER — SODIUM CHLORIDE 9 MG/ML
1000 INJECTION, SOLUTION INTRAVENOUS
Status: COMPLETED | OUTPATIENT
Start: 2021-03-22 | End: 2021-03-22

## 2021-03-22 RX ORDER — HEPARIN 100 UNIT/ML
500 SYRINGE INTRAVENOUS
Status: CANCELLED | OUTPATIENT
Start: 2021-03-22

## 2021-03-22 RX ADMIN — SODIUM CHLORIDE 1000 ML: 0.9 INJECTION, SOLUTION INTRAVENOUS at 01:03

## 2021-03-22 RX ADMIN — HEPARIN 500 UNITS: 100 SYRINGE at 04:03

## 2021-03-22 RX ADMIN — PROMETHAZINE HYDROCHLORIDE 12.5 MG: 25 INJECTION INTRAMUSCULAR; INTRAVENOUS at 02:03

## 2021-03-24 ENCOUNTER — INFUSION (OUTPATIENT)
Dept: INFUSION THERAPY | Facility: HOSPITAL | Age: 51
End: 2021-03-24
Attending: INTERNAL MEDICINE
Payer: MEDICAID

## 2021-03-24 VITALS
DIASTOLIC BLOOD PRESSURE: 77 MMHG | HEART RATE: 74 BPM | SYSTOLIC BLOOD PRESSURE: 118 MMHG | TEMPERATURE: 97 F | RESPIRATION RATE: 16 BRPM | OXYGEN SATURATION: 98 %

## 2021-03-24 DIAGNOSIS — C79.51 SECONDARY CANCER OF BONE: ICD-10-CM

## 2021-03-24 DIAGNOSIS — T45.1X5A CHEMOTHERAPY-INDUCED NAUSEA: Primary | ICD-10-CM

## 2021-03-24 DIAGNOSIS — R11.0 CHEMOTHERAPY-INDUCED NAUSEA: Primary | ICD-10-CM

## 2021-03-24 PROCEDURE — 25000003 PHARM REV CODE 250: Performed by: NURSE PRACTITIONER

## 2021-03-24 PROCEDURE — 96360 HYDRATION IV INFUSION INIT: CPT

## 2021-03-24 PROCEDURE — 63600175 PHARM REV CODE 636 W HCPCS: Performed by: NURSE PRACTITIONER

## 2021-03-24 PROCEDURE — 96361 HYDRATE IV INFUSION ADD-ON: CPT

## 2021-03-24 RX ORDER — HEPARIN 100 UNIT/ML
500 SYRINGE INTRAVENOUS
Status: DISCONTINUED | OUTPATIENT
Start: 2021-03-24 | End: 2021-03-24 | Stop reason: HOSPADM

## 2021-03-24 RX ORDER — SODIUM CHLORIDE 0.9 % (FLUSH) 0.9 %
10 SYRINGE (ML) INJECTION
Status: CANCELLED | OUTPATIENT
Start: 2021-03-24

## 2021-03-24 RX ORDER — HEPARIN 100 UNIT/ML
500 SYRINGE INTRAVENOUS
Status: CANCELLED | OUTPATIENT
Start: 2021-03-24

## 2021-03-24 RX ORDER — SODIUM CHLORIDE 9 MG/ML
1000 INJECTION, SOLUTION INTRAVENOUS
Status: COMPLETED | OUTPATIENT
Start: 2021-03-24 | End: 2021-03-24

## 2021-03-24 RX ADMIN — SODIUM CHLORIDE 1000 ML: 0.9 INJECTION, SOLUTION INTRAVENOUS at 02:03

## 2021-03-24 RX ADMIN — HEPARIN SODIUM (PORCINE) LOCK FLUSH IV SOLN 100 UNIT/ML 500 UNITS: 100 SOLUTION at 04:03

## 2021-03-25 DIAGNOSIS — G89.3 CANCER ASSOCIATED PAIN: ICD-10-CM

## 2021-03-26 ENCOUNTER — INFUSION (OUTPATIENT)
Dept: INFUSION THERAPY | Facility: HOSPITAL | Age: 51
End: 2021-03-26
Attending: INTERNAL MEDICINE
Payer: MEDICAID

## 2021-03-26 VITALS
OXYGEN SATURATION: 98 % | DIASTOLIC BLOOD PRESSURE: 74 MMHG | HEART RATE: 74 BPM | RESPIRATION RATE: 18 BRPM | SYSTOLIC BLOOD PRESSURE: 108 MMHG | TEMPERATURE: 98 F

## 2021-03-26 DIAGNOSIS — C79.51 SECONDARY CANCER OF BONE: ICD-10-CM

## 2021-03-26 DIAGNOSIS — R11.0 CHEMOTHERAPY-INDUCED NAUSEA: Primary | ICD-10-CM

## 2021-03-26 DIAGNOSIS — T45.1X5A CHEMOTHERAPY-INDUCED NAUSEA: Primary | ICD-10-CM

## 2021-03-26 PROCEDURE — 25000003 PHARM REV CODE 250: Performed by: NURSE PRACTITIONER

## 2021-03-26 PROCEDURE — 63600175 PHARM REV CODE 636 W HCPCS: Performed by: NURSE PRACTITIONER

## 2021-03-26 PROCEDURE — 96361 HYDRATE IV INFUSION ADD-ON: CPT

## 2021-03-26 PROCEDURE — 96360 HYDRATION IV INFUSION INIT: CPT

## 2021-03-26 RX ORDER — SODIUM CHLORIDE 0.9 % (FLUSH) 0.9 %
10 SYRINGE (ML) INJECTION
Status: DISCONTINUED | OUTPATIENT
Start: 2021-03-26 | End: 2021-03-26 | Stop reason: HOSPADM

## 2021-03-26 RX ORDER — SODIUM CHLORIDE 0.9 % (FLUSH) 0.9 %
10 SYRINGE (ML) INJECTION
Status: CANCELLED | OUTPATIENT
Start: 2021-03-26

## 2021-03-26 RX ORDER — HEPARIN 100 UNIT/ML
500 SYRINGE INTRAVENOUS
Status: DISCONTINUED | OUTPATIENT
Start: 2021-03-26 | End: 2021-03-26 | Stop reason: HOSPADM

## 2021-03-26 RX ORDER — HEPARIN 100 UNIT/ML
500 SYRINGE INTRAVENOUS
Status: CANCELLED | OUTPATIENT
Start: 2021-03-26

## 2021-03-26 RX ORDER — SODIUM CHLORIDE 9 MG/ML
1000 INJECTION, SOLUTION INTRAVENOUS
Status: COMPLETED | OUTPATIENT
Start: 2021-03-26 | End: 2021-03-26

## 2021-03-26 RX ORDER — HYDROCODONE BITARTRATE AND ACETAMINOPHEN 10; 325 MG/1; MG/1
1 TABLET ORAL EVERY 4 HOURS PRN
Qty: 120 TABLET | Refills: 0 | Status: SHIPPED | OUTPATIENT
Start: 2021-03-26 | End: 2021-04-15 | Stop reason: SDUPTHER

## 2021-03-26 RX ADMIN — HEPARIN 500 UNITS: 100 SYRINGE at 03:03

## 2021-03-26 RX ADMIN — SODIUM CHLORIDE 1000 ML: 0.9 INJECTION, SOLUTION INTRAVENOUS at 01:03

## 2021-03-29 ENCOUNTER — INFUSION (OUTPATIENT)
Dept: INFUSION THERAPY | Facility: HOSPITAL | Age: 51
End: 2021-03-29
Attending: INTERNAL MEDICINE
Payer: MEDICAID

## 2021-03-29 VITALS
HEART RATE: 76 BPM | RESPIRATION RATE: 18 BRPM | SYSTOLIC BLOOD PRESSURE: 111 MMHG | DIASTOLIC BLOOD PRESSURE: 71 MMHG | TEMPERATURE: 98 F | OXYGEN SATURATION: 98 %

## 2021-03-29 DIAGNOSIS — R11.0 CHEMOTHERAPY-INDUCED NAUSEA: Primary | ICD-10-CM

## 2021-03-29 DIAGNOSIS — T45.1X5A CHEMOTHERAPY-INDUCED NAUSEA: Primary | ICD-10-CM

## 2021-03-29 DIAGNOSIS — C79.51 SECONDARY CANCER OF BONE: ICD-10-CM

## 2021-03-29 PROCEDURE — 63600175 PHARM REV CODE 636 W HCPCS: Performed by: NURSE PRACTITIONER

## 2021-03-29 PROCEDURE — 96360 HYDRATION IV INFUSION INIT: CPT

## 2021-03-29 PROCEDURE — 96361 HYDRATE IV INFUSION ADD-ON: CPT

## 2021-03-29 PROCEDURE — 25000003 PHARM REV CODE 250: Performed by: NURSE PRACTITIONER

## 2021-03-29 RX ORDER — HEPARIN 100 UNIT/ML
500 SYRINGE INTRAVENOUS
Status: CANCELLED | OUTPATIENT
Start: 2021-03-29

## 2021-03-29 RX ORDER — SODIUM CHLORIDE 9 MG/ML
1000 INJECTION, SOLUTION INTRAVENOUS
Status: COMPLETED | OUTPATIENT
Start: 2021-03-29 | End: 2021-03-29

## 2021-03-29 RX ORDER — HEPARIN 100 UNIT/ML
500 SYRINGE INTRAVENOUS
Status: DISCONTINUED | OUTPATIENT
Start: 2021-03-29 | End: 2021-03-29 | Stop reason: HOSPADM

## 2021-03-29 RX ORDER — SODIUM CHLORIDE 0.9 % (FLUSH) 0.9 %
10 SYRINGE (ML) INJECTION
Status: CANCELLED | OUTPATIENT
Start: 2021-03-29

## 2021-03-29 RX ADMIN — HEPARIN 500 UNITS: 100 SYRINGE at 04:03

## 2021-03-29 RX ADMIN — SODIUM CHLORIDE 1000 ML: 0.9 INJECTION, SOLUTION INTRAVENOUS at 02:03

## 2021-03-31 ENCOUNTER — INFUSION (OUTPATIENT)
Dept: INFUSION THERAPY | Facility: HOSPITAL | Age: 51
End: 2021-03-31
Attending: INTERNAL MEDICINE
Payer: MEDICAID

## 2021-03-31 VITALS
SYSTOLIC BLOOD PRESSURE: 138 MMHG | DIASTOLIC BLOOD PRESSURE: 85 MMHG | RESPIRATION RATE: 18 BRPM | HEART RATE: 73 BPM | OXYGEN SATURATION: 99 % | TEMPERATURE: 99 F

## 2021-03-31 DIAGNOSIS — T45.1X5A CHEMOTHERAPY-INDUCED NAUSEA: Primary | ICD-10-CM

## 2021-03-31 DIAGNOSIS — C79.51 SECONDARY CANCER OF BONE: ICD-10-CM

## 2021-03-31 DIAGNOSIS — R11.0 CHEMOTHERAPY-INDUCED NAUSEA: Primary | ICD-10-CM

## 2021-03-31 PROCEDURE — 63600175 PHARM REV CODE 636 W HCPCS: Performed by: NURSE PRACTITIONER

## 2021-03-31 PROCEDURE — 96360 HYDRATION IV INFUSION INIT: CPT

## 2021-03-31 PROCEDURE — 25000003 PHARM REV CODE 250: Performed by: NURSE PRACTITIONER

## 2021-03-31 PROCEDURE — 96361 HYDRATE IV INFUSION ADD-ON: CPT

## 2021-03-31 RX ORDER — SODIUM CHLORIDE 0.9 % (FLUSH) 0.9 %
10 SYRINGE (ML) INJECTION
Status: CANCELLED | OUTPATIENT
Start: 2021-03-31

## 2021-03-31 RX ORDER — HEPARIN 100 UNIT/ML
500 SYRINGE INTRAVENOUS
Status: CANCELLED | OUTPATIENT
Start: 2021-03-31

## 2021-03-31 RX ORDER — HEPARIN 100 UNIT/ML
500 SYRINGE INTRAVENOUS
Status: DISCONTINUED | OUTPATIENT
Start: 2021-03-31 | End: 2021-03-31 | Stop reason: HOSPADM

## 2021-03-31 RX ORDER — SODIUM CHLORIDE 9 MG/ML
1000 INJECTION, SOLUTION INTRAVENOUS
Status: COMPLETED | OUTPATIENT
Start: 2021-03-31 | End: 2021-03-31

## 2021-03-31 RX ADMIN — SODIUM CHLORIDE 1000 ML: 0.9 INJECTION, SOLUTION INTRAVENOUS at 11:03

## 2021-03-31 RX ADMIN — HEPARIN 500 UNITS: 100 SYRINGE at 02:03

## 2021-04-01 ENCOUNTER — INFUSION (OUTPATIENT)
Dept: INFUSION THERAPY | Facility: HOSPITAL | Age: 51
End: 2021-04-01
Attending: INTERNAL MEDICINE
Payer: MEDICAID

## 2021-04-01 VITALS
TEMPERATURE: 98 F | SYSTOLIC BLOOD PRESSURE: 129 MMHG | DIASTOLIC BLOOD PRESSURE: 78 MMHG | HEART RATE: 75 BPM | RESPIRATION RATE: 18 BRPM | OXYGEN SATURATION: 99 %

## 2021-04-01 DIAGNOSIS — R11.0 CHEMOTHERAPY-INDUCED NAUSEA: Primary | ICD-10-CM

## 2021-04-01 DIAGNOSIS — T45.1X5A CHEMOTHERAPY-INDUCED NAUSEA: Primary | ICD-10-CM

## 2021-04-01 DIAGNOSIS — C79.51 SECONDARY CANCER OF BONE: ICD-10-CM

## 2021-04-01 PROCEDURE — 63600175 PHARM REV CODE 636 W HCPCS: Performed by: NURSE PRACTITIONER

## 2021-04-01 PROCEDURE — 96361 HYDRATE IV INFUSION ADD-ON: CPT

## 2021-04-01 PROCEDURE — 96360 HYDRATION IV INFUSION INIT: CPT

## 2021-04-01 PROCEDURE — 25000003 PHARM REV CODE 250: Performed by: NURSE PRACTITIONER

## 2021-04-01 RX ORDER — SODIUM CHLORIDE 0.9 % (FLUSH) 0.9 %
10 SYRINGE (ML) INJECTION
Status: CANCELLED | OUTPATIENT
Start: 2021-04-01

## 2021-04-01 RX ORDER — HEPARIN 100 UNIT/ML
500 SYRINGE INTRAVENOUS
Status: CANCELLED | OUTPATIENT
Start: 2021-04-01

## 2021-04-01 RX ORDER — SODIUM CHLORIDE 9 MG/ML
1000 INJECTION, SOLUTION INTRAVENOUS
Status: COMPLETED | OUTPATIENT
Start: 2021-04-01 | End: 2021-04-01

## 2021-04-01 RX ORDER — HEPARIN 100 UNIT/ML
500 SYRINGE INTRAVENOUS
Status: DISCONTINUED | OUTPATIENT
Start: 2021-04-01 | End: 2021-04-01 | Stop reason: HOSPADM

## 2021-04-01 RX ORDER — SODIUM CHLORIDE 9 MG/ML
1000 INJECTION, SOLUTION INTRAVENOUS
Status: DISCONTINUED | OUTPATIENT
Start: 2021-04-01 | End: 2021-04-01 | Stop reason: HOSPADM

## 2021-04-01 RX ORDER — SODIUM CHLORIDE 0.9 % (FLUSH) 0.9 %
10 SYRINGE (ML) INJECTION
Status: DISCONTINUED | OUTPATIENT
Start: 2021-04-01 | End: 2021-04-01 | Stop reason: HOSPADM

## 2021-04-01 RX ADMIN — HEPARIN 500 UNITS: 100 SYRINGE at 04:04

## 2021-04-01 RX ADMIN — SODIUM CHLORIDE 1000 ML: 0.9 INJECTION, SOLUTION INTRAVENOUS at 02:04

## 2021-04-05 ENCOUNTER — LAB VISIT (OUTPATIENT)
Dept: LAB | Facility: HOSPITAL | Age: 51
End: 2021-04-05
Attending: INTERNAL MEDICINE
Payer: MEDICAID

## 2021-04-05 ENCOUNTER — SPECIALTY PHARMACY (OUTPATIENT)
Dept: PHARMACY | Facility: CLINIC | Age: 51
End: 2021-04-05

## 2021-04-05 DIAGNOSIS — C53.0 MALIGNANT NEOPLASM OF ENDOCERVIX: ICD-10-CM

## 2021-04-05 LAB
ALBUMIN SERPL BCP-MCNC: 3.9 G/DL (ref 3.5–5.2)
ALP SERPL-CCNC: 90 U/L (ref 55–135)
ALT SERPL W/O P-5'-P-CCNC: 18 U/L (ref 10–44)
ANION GAP SERPL CALC-SCNC: 11 MMOL/L (ref 8–16)
AST SERPL-CCNC: 16 U/L (ref 10–40)
BASOPHILS # BLD AUTO: 0.07 K/UL (ref 0–0.2)
BASOPHILS NFR BLD: 0.8 % (ref 0–1.9)
BILIRUB SERPL-MCNC: 0.6 MG/DL (ref 0.1–1)
BUN SERPL-MCNC: 15 MG/DL (ref 6–20)
CALCIUM SERPL-MCNC: 9.5 MG/DL (ref 8.7–10.5)
CHLORIDE SERPL-SCNC: 101 MMOL/L (ref 95–110)
CO2 SERPL-SCNC: 26 MMOL/L (ref 23–29)
CREAT SERPL-MCNC: 1.1 MG/DL (ref 0.5–1.4)
DIFFERENTIAL METHOD: ABNORMAL
EOSINOPHIL # BLD AUTO: 0.1 K/UL (ref 0–0.5)
EOSINOPHIL NFR BLD: 0.6 % (ref 0–8)
ERYTHROCYTE [DISTWIDTH] IN BLOOD BY AUTOMATED COUNT: 15.6 % (ref 11.5–14.5)
EST. GFR  (AFRICAN AMERICAN): >60 ML/MIN/1.73 M^2
EST. GFR  (NON AFRICAN AMERICAN): 58 ML/MIN/1.73 M^2
GLUCOSE SERPL-MCNC: 131 MG/DL (ref 70–110)
HCT VFR BLD AUTO: 37.2 % (ref 37–48.5)
HGB BLD-MCNC: 12.3 G/DL (ref 12–16)
IMM GRANULOCYTES # BLD AUTO: 0.2 K/UL (ref 0–0.04)
IMM GRANULOCYTES NFR BLD AUTO: 2.3 % (ref 0–0.5)
LYMPHOCYTES # BLD AUTO: 1.8 K/UL (ref 1–4.8)
LYMPHOCYTES NFR BLD: 21.3 % (ref 18–48)
MCH RBC QN AUTO: 33.9 PG (ref 27–31)
MCHC RBC AUTO-ENTMCNC: 33.1 G/DL (ref 32–36)
MCV RBC AUTO: 103 FL (ref 82–98)
MONOCYTES # BLD AUTO: 0.7 K/UL (ref 0.3–1)
MONOCYTES NFR BLD: 8.5 % (ref 4–15)
NEUTROPHILS # BLD AUTO: 5.7 K/UL (ref 1.8–7.7)
NEUTROPHILS NFR BLD: 66.5 % (ref 38–73)
NRBC BLD-RTO: 0 /100 WBC
PLATELET # BLD AUTO: 243 K/UL (ref 150–450)
PMV BLD AUTO: 10.8 FL (ref 9.2–12.9)
POTASSIUM SERPL-SCNC: 4.5 MMOL/L (ref 3.5–5.1)
PROT SERPL-MCNC: 7.5 G/DL (ref 6–8.4)
RBC # BLD AUTO: 3.63 M/UL (ref 4–5.4)
SODIUM SERPL-SCNC: 138 MMOL/L (ref 136–145)
WBC # BLD AUTO: 8.63 K/UL (ref 3.9–12.7)

## 2021-04-05 PROCEDURE — 85025 COMPLETE CBC W/AUTO DIFF WBC: CPT | Performed by: INTERNAL MEDICINE

## 2021-04-05 PROCEDURE — 36415 COLL VENOUS BLD VENIPUNCTURE: CPT | Performed by: INTERNAL MEDICINE

## 2021-04-05 PROCEDURE — 80053 COMPREHEN METABOLIC PANEL: CPT | Performed by: INTERNAL MEDICINE

## 2021-04-06 ENCOUNTER — OFFICE VISIT (OUTPATIENT)
Dept: HEMATOLOGY/ONCOLOGY | Facility: CLINIC | Age: 51
End: 2021-04-06
Payer: MEDICAID

## 2021-04-06 ENCOUNTER — INFUSION (OUTPATIENT)
Dept: INFUSION THERAPY | Facility: HOSPITAL | Age: 51
End: 2021-04-06
Attending: INTERNAL MEDICINE
Payer: MEDICAID

## 2021-04-06 VITALS
OXYGEN SATURATION: 98 % | HEART RATE: 85 BPM | RESPIRATION RATE: 18 BRPM | DIASTOLIC BLOOD PRESSURE: 80 MMHG | SYSTOLIC BLOOD PRESSURE: 125 MMHG | TEMPERATURE: 98 F

## 2021-04-06 VITALS
WEIGHT: 206.81 LBS | OXYGEN SATURATION: 99 % | SYSTOLIC BLOOD PRESSURE: 110 MMHG | BODY MASS INDEX: 32.46 KG/M2 | HEIGHT: 67 IN | HEART RATE: 106 BPM | DIASTOLIC BLOOD PRESSURE: 78 MMHG | TEMPERATURE: 98 F

## 2021-04-06 DIAGNOSIS — C53.0 MALIGNANT NEOPLASM OF ENDOCERVIX: Primary | ICD-10-CM

## 2021-04-06 DIAGNOSIS — C77.3 MALIGNANT NEOPLASM METASTATIC TO LYMPH NODE OF AXILLA: ICD-10-CM

## 2021-04-06 DIAGNOSIS — E86.0 DEHYDRATION: ICD-10-CM

## 2021-04-06 DIAGNOSIS — Z17.0 MALIGNANT NEOPLASM OF OVERLAPPING SITES OF RIGHT BREAST IN FEMALE, ESTROGEN RECEPTOR POSITIVE: ICD-10-CM

## 2021-04-06 DIAGNOSIS — C79.51 SECONDARY CANCER OF BONE: ICD-10-CM

## 2021-04-06 DIAGNOSIS — C50.811 MALIGNANT NEOPLASM OF OVERLAPPING SITES OF RIGHT BREAST IN FEMALE, ESTROGEN RECEPTOR POSITIVE: ICD-10-CM

## 2021-04-06 DIAGNOSIS — R11.0 NAUSEA: ICD-10-CM

## 2021-04-06 PROCEDURE — 63600175 PHARM REV CODE 636 W HCPCS: Performed by: INTERNAL MEDICINE

## 2021-04-06 PROCEDURE — 96417 CHEMO IV INFUS EACH ADDL SEQ: CPT

## 2021-04-06 PROCEDURE — 99215 OFFICE O/P EST HI 40 MIN: CPT | Mod: S$PBB,,, | Performed by: INTERNAL MEDICINE

## 2021-04-06 PROCEDURE — 25000003 PHARM REV CODE 250: Performed by: INTERNAL MEDICINE

## 2021-04-06 PROCEDURE — 99999 PR PBB SHADOW E&M-EST. PATIENT-LVL V: ICD-10-PCS | Mod: PBBFAC,,, | Performed by: INTERNAL MEDICINE

## 2021-04-06 PROCEDURE — 99215 OFFICE O/P EST HI 40 MIN: CPT | Mod: PBBFAC,25 | Performed by: INTERNAL MEDICINE

## 2021-04-06 PROCEDURE — 96413 CHEMO IV INFUSION 1 HR: CPT

## 2021-04-06 PROCEDURE — 99215 PR OFFICE/OUTPT VISIT, EST, LEVL V, 40-54 MIN: ICD-10-PCS | Mod: S$PBB,,, | Performed by: INTERNAL MEDICINE

## 2021-04-06 PROCEDURE — 96366 THER/PROPH/DIAG IV INF ADDON: CPT

## 2021-04-06 PROCEDURE — 96415 CHEMO IV INFUSION ADDL HR: CPT

## 2021-04-06 PROCEDURE — 96367 TX/PROPH/DG ADDL SEQ IV INF: CPT

## 2021-04-06 PROCEDURE — 99999 PR PBB SHADOW E&M-EST. PATIENT-LVL V: CPT | Mod: PBBFAC,,, | Performed by: INTERNAL MEDICINE

## 2021-04-06 PROCEDURE — 96375 TX/PRO/DX INJ NEW DRUG ADDON: CPT

## 2021-04-06 RX ORDER — EPINEPHRINE 0.3 MG/.3ML
0.3 INJECTION SUBCUTANEOUS ONCE AS NEEDED
Status: CANCELLED | OUTPATIENT
Start: 2021-04-06

## 2021-04-06 RX ORDER — SODIUM CHLORIDE 0.9 % (FLUSH) 0.9 %
10 SYRINGE (ML) INJECTION
Status: CANCELLED | OUTPATIENT
Start: 2021-04-13

## 2021-04-06 RX ORDER — HEPARIN 100 UNIT/ML
500 SYRINGE INTRAVENOUS
Status: DISCONTINUED | OUTPATIENT
Start: 2021-04-06 | End: 2021-04-06 | Stop reason: HOSPADM

## 2021-04-06 RX ORDER — DIPHENHYDRAMINE HYDROCHLORIDE 50 MG/ML
50 INJECTION INTRAMUSCULAR; INTRAVENOUS ONCE AS NEEDED
Status: CANCELLED | OUTPATIENT
Start: 2021-04-06

## 2021-04-06 RX ORDER — HEPARIN 100 UNIT/ML
500 SYRINGE INTRAVENOUS
Status: CANCELLED | OUTPATIENT
Start: 2021-04-13

## 2021-04-06 RX ORDER — FAMOTIDINE 10 MG/ML
20 INJECTION INTRAVENOUS
Status: CANCELLED | OUTPATIENT
Start: 2021-04-06

## 2021-04-06 RX ORDER — FAMOTIDINE 10 MG/ML
20 INJECTION INTRAVENOUS
Status: COMPLETED | OUTPATIENT
Start: 2021-04-06 | End: 2021-04-06

## 2021-04-06 RX ORDER — HEPARIN 100 UNIT/ML
500 SYRINGE INTRAVENOUS
Status: CANCELLED | OUTPATIENT
Start: 2021-04-06

## 2021-04-06 RX ORDER — SODIUM CHLORIDE 0.9 % (FLUSH) 0.9 %
10 SYRINGE (ML) INJECTION
Status: CANCELLED | OUTPATIENT
Start: 2021-04-06

## 2021-04-06 RX ORDER — HEPARIN 100 UNIT/ML
500 SYRINGE INTRAVENOUS
Status: CANCELLED | OUTPATIENT
Start: 2021-04-07

## 2021-04-06 RX ORDER — SODIUM CHLORIDE 0.9 % (FLUSH) 0.9 %
10 SYRINGE (ML) INJECTION
Status: CANCELLED | OUTPATIENT
Start: 2021-04-07

## 2021-04-06 RX ADMIN — BEVACIZUMAB 1480 MG: 400 INJECTION, SOLUTION INTRAVENOUS at 05:04

## 2021-04-06 RX ADMIN — CISPLATIN 108 MG: 100 INJECTION, SOLUTION INTRAVENOUS at 12:04

## 2021-04-06 RX ADMIN — APREPITANT 130 MG: 130 INJECTION, EMULSION INTRAVENOUS at 11:04

## 2021-04-06 RX ADMIN — FAMOTIDINE 20 MG: 10 INJECTION INTRAVENOUS at 11:04

## 2021-04-06 RX ADMIN — HEPARIN 500 UNITS: 100 SYRINGE at 05:04

## 2021-04-06 RX ADMIN — PACLITAXEL 378 MG: 6 INJECTION, SOLUTION INTRAVENOUS at 01:04

## 2021-04-06 RX ADMIN — POTASSIUM CHLORIDE: 2 INJECTION, SOLUTION, CONCENTRATE INTRAVENOUS at 09:04

## 2021-04-06 RX ADMIN — DIPHENHYDRAMINE HYDROCHLORIDE 50 MG: 50 INJECTION, SOLUTION INTRAMUSCULAR; INTRAVENOUS at 12:04

## 2021-04-06 RX ADMIN — PALONOSETRON HYDROCHLORIDE 0.25 MG: 0.25 INJECTION, SOLUTION INTRAVENOUS at 11:04

## 2021-04-07 ENCOUNTER — INFUSION (OUTPATIENT)
Dept: INFUSION THERAPY | Facility: HOSPITAL | Age: 51
End: 2021-04-07
Attending: INTERNAL MEDICINE
Payer: MEDICAID

## 2021-04-07 VITALS
OXYGEN SATURATION: 99 % | RESPIRATION RATE: 16 BRPM | TEMPERATURE: 98 F | DIASTOLIC BLOOD PRESSURE: 93 MMHG | HEART RATE: 75 BPM | SYSTOLIC BLOOD PRESSURE: 143 MMHG

## 2021-04-07 DIAGNOSIS — C53.0 MALIGNANT NEOPLASM OF ENDOCERVIX: Primary | ICD-10-CM

## 2021-04-07 PROCEDURE — 96361 HYDRATE IV INFUSION ADD-ON: CPT

## 2021-04-07 PROCEDURE — 96360 HYDRATION IV INFUSION INIT: CPT

## 2021-04-07 PROCEDURE — 63600175 PHARM REV CODE 636 W HCPCS: Performed by: INTERNAL MEDICINE

## 2021-04-07 PROCEDURE — 25000003 PHARM REV CODE 250: Performed by: INTERNAL MEDICINE

## 2021-04-07 RX ORDER — HEPARIN 100 UNIT/ML
500 SYRINGE INTRAVENOUS
Status: DISCONTINUED | OUTPATIENT
Start: 2021-04-07 | End: 2021-04-07 | Stop reason: HOSPADM

## 2021-04-07 RX ORDER — SODIUM CHLORIDE 9 MG/ML
1000 INJECTION, SOLUTION INTRAVENOUS
Status: COMPLETED | OUTPATIENT
Start: 2021-04-07 | End: 2021-04-07

## 2021-04-07 RX ADMIN — SODIUM CHLORIDE 1000 ML: 0.9 INJECTION, SOLUTION INTRAVENOUS at 01:04

## 2021-04-07 RX ADMIN — HEPARIN 500 UNITS: 100 SYRINGE at 03:04

## 2021-04-08 DIAGNOSIS — R11.0 NAUSEA: ICD-10-CM

## 2021-04-08 RX ORDER — OLANZAPINE 5 MG/1
5 TABLET ORAL NIGHTLY
Qty: 12 TABLET | Refills: 2 | Status: SHIPPED | OUTPATIENT
Start: 2021-04-08 | End: 2022-04-08

## 2021-04-09 ENCOUNTER — INFUSION (OUTPATIENT)
Dept: INFUSION THERAPY | Facility: HOSPITAL | Age: 51
End: 2021-04-09
Attending: INTERNAL MEDICINE
Payer: MEDICAID

## 2021-04-09 VITALS
TEMPERATURE: 98 F | SYSTOLIC BLOOD PRESSURE: 101 MMHG | OXYGEN SATURATION: 97 % | HEART RATE: 87 BPM | DIASTOLIC BLOOD PRESSURE: 63 MMHG | RESPIRATION RATE: 18 BRPM

## 2021-04-09 DIAGNOSIS — C79.51 SECONDARY CANCER OF BONE: ICD-10-CM

## 2021-04-09 DIAGNOSIS — T45.1X5A CHEMOTHERAPY-INDUCED NAUSEA: Primary | ICD-10-CM

## 2021-04-09 DIAGNOSIS — R11.0 CHEMOTHERAPY-INDUCED NAUSEA: Primary | ICD-10-CM

## 2021-04-09 PROCEDURE — 63600175 PHARM REV CODE 636 W HCPCS: Performed by: NURSE PRACTITIONER

## 2021-04-09 PROCEDURE — 96361 HYDRATE IV INFUSION ADD-ON: CPT

## 2021-04-09 PROCEDURE — 96360 HYDRATION IV INFUSION INIT: CPT

## 2021-04-09 PROCEDURE — 25000003 PHARM REV CODE 250: Performed by: NURSE PRACTITIONER

## 2021-04-09 RX ORDER — SODIUM CHLORIDE 0.9 % (FLUSH) 0.9 %
10 SYRINGE (ML) INJECTION
Status: CANCELLED | OUTPATIENT
Start: 2021-04-09

## 2021-04-09 RX ORDER — SODIUM CHLORIDE 9 MG/ML
1000 INJECTION, SOLUTION INTRAVENOUS
Status: COMPLETED | OUTPATIENT
Start: 2021-04-09 | End: 2021-04-09

## 2021-04-09 RX ORDER — HEPARIN 100 UNIT/ML
500 SYRINGE INTRAVENOUS
Status: CANCELLED | OUTPATIENT
Start: 2021-04-09

## 2021-04-09 RX ORDER — HEPARIN 100 UNIT/ML
500 SYRINGE INTRAVENOUS
Status: DISCONTINUED | OUTPATIENT
Start: 2021-04-09 | End: 2021-04-09 | Stop reason: HOSPADM

## 2021-04-09 RX ADMIN — HEPARIN 500 UNITS: 100 SYRINGE at 03:04

## 2021-04-09 RX ADMIN — SODIUM CHLORIDE 1000 ML: 0.9 INJECTION, SOLUTION INTRAVENOUS at 01:04

## 2021-04-12 ENCOUNTER — INFUSION (OUTPATIENT)
Dept: INFUSION THERAPY | Facility: HOSPITAL | Age: 51
End: 2021-04-12
Attending: INTERNAL MEDICINE
Payer: MEDICAID

## 2021-04-12 VITALS
DIASTOLIC BLOOD PRESSURE: 65 MMHG | TEMPERATURE: 98 F | SYSTOLIC BLOOD PRESSURE: 113 MMHG | RESPIRATION RATE: 16 BRPM | HEART RATE: 80 BPM | OXYGEN SATURATION: 99 %

## 2021-04-12 DIAGNOSIS — T45.1X5A CHEMOTHERAPY-INDUCED NAUSEA: Primary | ICD-10-CM

## 2021-04-12 DIAGNOSIS — R11.0 CHEMOTHERAPY-INDUCED NAUSEA: Primary | ICD-10-CM

## 2021-04-12 DIAGNOSIS — C79.51 SECONDARY CANCER OF BONE: ICD-10-CM

## 2021-04-12 PROCEDURE — 63600175 PHARM REV CODE 636 W HCPCS: Performed by: NURSE PRACTITIONER

## 2021-04-12 PROCEDURE — 96360 HYDRATION IV INFUSION INIT: CPT

## 2021-04-12 PROCEDURE — 63600175 PHARM REV CODE 636 W HCPCS: Mod: JG | Performed by: INTERNAL MEDICINE

## 2021-04-12 PROCEDURE — 96361 HYDRATE IV INFUSION ADD-ON: CPT

## 2021-04-12 PROCEDURE — 36593 DECLOT VASCULAR DEVICE: CPT

## 2021-04-12 PROCEDURE — 25000003 PHARM REV CODE 250: Performed by: NURSE PRACTITIONER

## 2021-04-12 RX ORDER — HEPARIN 100 UNIT/ML
500 SYRINGE INTRAVENOUS
Status: DISCONTINUED | OUTPATIENT
Start: 2021-04-12 | End: 2021-04-12 | Stop reason: HOSPADM

## 2021-04-12 RX ORDER — SODIUM CHLORIDE 9 MG/ML
1000 INJECTION, SOLUTION INTRAVENOUS
Status: COMPLETED | OUTPATIENT
Start: 2021-04-12 | End: 2021-04-12

## 2021-04-12 RX ORDER — HEPARIN 100 UNIT/ML
500 SYRINGE INTRAVENOUS
Status: CANCELLED | OUTPATIENT
Start: 2021-04-12

## 2021-04-12 RX ORDER — SODIUM CHLORIDE 0.9 % (FLUSH) 0.9 %
10 SYRINGE (ML) INJECTION
Status: CANCELLED | OUTPATIENT
Start: 2021-04-12

## 2021-04-12 RX ADMIN — SODIUM CHLORIDE 1000 ML: 0.9 INJECTION, SOLUTION INTRAVENOUS at 02:04

## 2021-04-12 RX ADMIN — HEPARIN 500 UNITS: 100 SYRINGE at 04:04

## 2021-04-12 RX ADMIN — ALTEPLASE 2 MG: 2.2 INJECTION, POWDER, LYOPHILIZED, FOR SOLUTION INTRAVENOUS at 01:04

## 2021-04-13 ENCOUNTER — TELEPHONE (OUTPATIENT)
Dept: ADMINISTRATIVE | Facility: OTHER | Age: 51
End: 2021-04-13

## 2021-04-14 ENCOUNTER — PATIENT MESSAGE (OUTPATIENT)
Dept: HEMATOLOGY/ONCOLOGY | Facility: CLINIC | Age: 51
End: 2021-04-14

## 2021-04-14 ENCOUNTER — INFUSION (OUTPATIENT)
Dept: INFUSION THERAPY | Facility: HOSPITAL | Age: 51
End: 2021-04-14
Attending: INTERNAL MEDICINE
Payer: MEDICAID

## 2021-04-14 VITALS
HEART RATE: 76 BPM | TEMPERATURE: 98 F | DIASTOLIC BLOOD PRESSURE: 80 MMHG | RESPIRATION RATE: 16 BRPM | WEIGHT: 205.94 LBS | BODY MASS INDEX: 32.25 KG/M2 | OXYGEN SATURATION: 98 % | SYSTOLIC BLOOD PRESSURE: 123 MMHG

## 2021-04-14 DIAGNOSIS — R11.0 CHEMOTHERAPY-INDUCED NAUSEA: Primary | ICD-10-CM

## 2021-04-14 DIAGNOSIS — T45.1X5A CHEMOTHERAPY-INDUCED NAUSEA: Primary | ICD-10-CM

## 2021-04-14 DIAGNOSIS — C79.51 SECONDARY CANCER OF BONE: ICD-10-CM

## 2021-04-14 PROCEDURE — 96360 HYDRATION IV INFUSION INIT: CPT

## 2021-04-14 PROCEDURE — 63600175 PHARM REV CODE 636 W HCPCS: Performed by: NURSE PRACTITIONER

## 2021-04-14 PROCEDURE — 96361 HYDRATE IV INFUSION ADD-ON: CPT

## 2021-04-14 PROCEDURE — 25000003 PHARM REV CODE 250: Performed by: NURSE PRACTITIONER

## 2021-04-14 RX ORDER — SODIUM CHLORIDE 0.9 % (FLUSH) 0.9 %
10 SYRINGE (ML) INJECTION
Status: CANCELLED | OUTPATIENT
Start: 2021-04-14

## 2021-04-14 RX ORDER — HEPARIN 100 UNIT/ML
500 SYRINGE INTRAVENOUS
Status: DISCONTINUED | OUTPATIENT
Start: 2021-04-14 | End: 2021-04-14 | Stop reason: HOSPADM

## 2021-04-14 RX ORDER — SODIUM CHLORIDE 9 MG/ML
1000 INJECTION, SOLUTION INTRAVENOUS
Status: COMPLETED | OUTPATIENT
Start: 2021-04-14 | End: 2021-04-14

## 2021-04-14 RX ORDER — HEPARIN 100 UNIT/ML
500 SYRINGE INTRAVENOUS
Status: CANCELLED | OUTPATIENT
Start: 2021-04-14

## 2021-04-14 RX ADMIN — SODIUM CHLORIDE 1000 ML: 0.9 INJECTION, SOLUTION INTRAVENOUS at 01:04

## 2021-04-14 RX ADMIN — HEPARIN 500 UNITS: 100 SYRINGE at 03:04

## 2021-04-15 ENCOUNTER — INFUSION (OUTPATIENT)
Dept: INFUSION THERAPY | Facility: HOSPITAL | Age: 51
End: 2021-04-15
Attending: NURSE PRACTITIONER
Payer: MEDICAID

## 2021-04-15 ENCOUNTER — DOCUMENTATION ONLY (OUTPATIENT)
Dept: NEUROLOGY | Facility: HOSPITAL | Age: 51
End: 2021-04-15

## 2021-04-15 VITALS
RESPIRATION RATE: 18 BRPM | SYSTOLIC BLOOD PRESSURE: 124 MMHG | OXYGEN SATURATION: 98 % | DIASTOLIC BLOOD PRESSURE: 81 MMHG | HEART RATE: 89 BPM | TEMPERATURE: 98 F

## 2021-04-15 DIAGNOSIS — G89.3 CANCER ASSOCIATED PAIN: ICD-10-CM

## 2021-04-15 DIAGNOSIS — C79.51 SECONDARY CANCER OF BONE: ICD-10-CM

## 2021-04-15 DIAGNOSIS — R11.0 CHEMOTHERAPY-INDUCED NAUSEA: Primary | ICD-10-CM

## 2021-04-15 DIAGNOSIS — T45.1X5A CHEMOTHERAPY-INDUCED NAUSEA: Primary | ICD-10-CM

## 2021-04-15 PROCEDURE — 63600175 PHARM REV CODE 636 W HCPCS: Performed by: NURSE PRACTITIONER

## 2021-04-15 PROCEDURE — 96360 HYDRATION IV INFUSION INIT: CPT

## 2021-04-15 PROCEDURE — 96361 HYDRATE IV INFUSION ADD-ON: CPT

## 2021-04-15 PROCEDURE — 25000003 PHARM REV CODE 250: Performed by: NURSE PRACTITIONER

## 2021-04-15 RX ORDER — SODIUM CHLORIDE 0.9 % (FLUSH) 0.9 %
10 SYRINGE (ML) INJECTION
Status: CANCELLED | OUTPATIENT
Start: 2021-04-15

## 2021-04-15 RX ORDER — HYDROCODONE BITARTRATE AND ACETAMINOPHEN 10; 325 MG/1; MG/1
1 TABLET ORAL EVERY 4 HOURS PRN
Qty: 120 TABLET | Refills: 0 | Status: SHIPPED | OUTPATIENT
Start: 2021-04-15 | End: 2021-05-05 | Stop reason: SDUPTHER

## 2021-04-15 RX ORDER — HEPARIN 100 UNIT/ML
500 SYRINGE INTRAVENOUS
Status: CANCELLED | OUTPATIENT
Start: 2021-04-15

## 2021-04-15 RX ORDER — HEPARIN 100 UNIT/ML
500 SYRINGE INTRAVENOUS
Status: DISCONTINUED | OUTPATIENT
Start: 2021-04-15 | End: 2021-04-15 | Stop reason: HOSPADM

## 2021-04-15 RX ADMIN — HEPARIN 500 UNITS: 100 SYRINGE at 03:04

## 2021-04-15 RX ADMIN — SODIUM CHLORIDE 1000 ML: 0.9 INJECTION, SOLUTION INTRAVENOUS at 01:04

## 2021-04-19 ENCOUNTER — INFUSION (OUTPATIENT)
Dept: INFUSION THERAPY | Facility: HOSPITAL | Age: 51
End: 2021-04-19
Attending: INTERNAL MEDICINE
Payer: MEDICAID

## 2021-04-19 VITALS
BODY MASS INDEX: 32.25 KG/M2 | TEMPERATURE: 98 F | OXYGEN SATURATION: 98 % | HEART RATE: 78 BPM | SYSTOLIC BLOOD PRESSURE: 121 MMHG | HEIGHT: 67 IN | RESPIRATION RATE: 16 BRPM | DIASTOLIC BLOOD PRESSURE: 78 MMHG

## 2021-04-19 DIAGNOSIS — T45.1X5A CHEMOTHERAPY-INDUCED NAUSEA: Primary | ICD-10-CM

## 2021-04-19 DIAGNOSIS — C79.51 SECONDARY CANCER OF BONE: ICD-10-CM

## 2021-04-19 DIAGNOSIS — R11.0 CHEMOTHERAPY-INDUCED NAUSEA: Primary | ICD-10-CM

## 2021-04-19 PROCEDURE — 96361 HYDRATE IV INFUSION ADD-ON: CPT

## 2021-04-19 PROCEDURE — 25000003 PHARM REV CODE 250: Performed by: NURSE PRACTITIONER

## 2021-04-19 PROCEDURE — 63600175 PHARM REV CODE 636 W HCPCS: Performed by: NURSE PRACTITIONER

## 2021-04-19 PROCEDURE — 96360 HYDRATION IV INFUSION INIT: CPT

## 2021-04-19 RX ORDER — SODIUM CHLORIDE 0.9 % (FLUSH) 0.9 %
10 SYRINGE (ML) INJECTION
Status: DISCONTINUED | OUTPATIENT
Start: 2021-04-19 | End: 2021-04-19 | Stop reason: HOSPADM

## 2021-04-19 RX ORDER — HEPARIN 100 UNIT/ML
500 SYRINGE INTRAVENOUS
Status: CANCELLED | OUTPATIENT
Start: 2021-04-19

## 2021-04-19 RX ORDER — SODIUM CHLORIDE 0.9 % (FLUSH) 0.9 %
10 SYRINGE (ML) INJECTION
Status: CANCELLED | OUTPATIENT
Start: 2021-04-19

## 2021-04-19 RX ORDER — SODIUM CHLORIDE 9 MG/ML
1000 INJECTION, SOLUTION INTRAVENOUS
Status: COMPLETED | OUTPATIENT
Start: 2021-04-19 | End: 2021-04-19

## 2021-04-19 RX ORDER — HEPARIN 100 UNIT/ML
500 SYRINGE INTRAVENOUS
Status: DISCONTINUED | OUTPATIENT
Start: 2021-04-19 | End: 2021-04-19 | Stop reason: HOSPADM

## 2021-04-19 RX ADMIN — SODIUM CHLORIDE 1000 ML: 0.9 INJECTION, SOLUTION INTRAVENOUS at 12:04

## 2021-04-19 RX ADMIN — HEPARIN 500 UNITS: 100 SYRINGE at 02:04

## 2021-04-20 DIAGNOSIS — G89.3 CANCER ASSOCIATED PAIN: ICD-10-CM

## 2021-04-20 DIAGNOSIS — F41.9 ANXIETY: ICD-10-CM

## 2021-04-21 ENCOUNTER — INFUSION (OUTPATIENT)
Dept: INFUSION THERAPY | Facility: HOSPITAL | Age: 51
End: 2021-04-21
Attending: INTERNAL MEDICINE
Payer: MEDICAID

## 2021-04-21 VITALS
HEART RATE: 73 BPM | OXYGEN SATURATION: 99 % | DIASTOLIC BLOOD PRESSURE: 79 MMHG | SYSTOLIC BLOOD PRESSURE: 125 MMHG | TEMPERATURE: 99 F | RESPIRATION RATE: 16 BRPM

## 2021-04-21 DIAGNOSIS — C53.0 MALIGNANT NEOPLASM OF ENDOCERVIX: Primary | ICD-10-CM

## 2021-04-21 PROCEDURE — 96361 HYDRATE IV INFUSION ADD-ON: CPT

## 2021-04-21 PROCEDURE — 25000003 PHARM REV CODE 250: Performed by: INTERNAL MEDICINE

## 2021-04-21 PROCEDURE — 96360 HYDRATION IV INFUSION INIT: CPT

## 2021-04-21 PROCEDURE — 63600175 PHARM REV CODE 636 W HCPCS: Performed by: INTERNAL MEDICINE

## 2021-04-21 RX ORDER — SODIUM CHLORIDE 9 MG/ML
1000 INJECTION, SOLUTION INTRAVENOUS
Status: COMPLETED | OUTPATIENT
Start: 2021-04-21 | End: 2021-04-21

## 2021-04-21 RX ORDER — MORPHINE SULFATE 15 MG/1
15 TABLET, FILM COATED, EXTENDED RELEASE ORAL EVERY 12 HOURS
Qty: 60 TABLET | Refills: 0 | Status: SHIPPED | OUTPATIENT
Start: 2021-04-21 | End: 2021-05-19 | Stop reason: SDUPTHER

## 2021-04-21 RX ORDER — ALPRAZOLAM 1 MG/1
.5-1 TABLET ORAL 2 TIMES DAILY PRN
Qty: 60 TABLET | Refills: 0 | Status: SHIPPED | OUTPATIENT
Start: 2021-04-21 | End: 2021-05-19 | Stop reason: SDUPTHER

## 2021-04-21 RX ORDER — HEPARIN 100 UNIT/ML
500 SYRINGE INTRAVENOUS
Status: DISCONTINUED | OUTPATIENT
Start: 2021-04-21 | End: 2021-04-21 | Stop reason: HOSPADM

## 2021-04-21 RX ADMIN — SODIUM CHLORIDE 1000 ML: 0.9 INJECTION, SOLUTION INTRAVENOUS at 02:04

## 2021-04-21 RX ADMIN — HEPARIN 500 UNITS: 100 SYRINGE at 04:04

## 2021-04-22 ENCOUNTER — TELEPHONE (OUTPATIENT)
Dept: RADIOLOGY | Facility: HOSPITAL | Age: 51
End: 2021-04-22

## 2021-04-23 ENCOUNTER — INFUSION (OUTPATIENT)
Dept: INFUSION THERAPY | Facility: HOSPITAL | Age: 51
End: 2021-04-23
Attending: INTERNAL MEDICINE
Payer: MEDICAID

## 2021-04-23 VITALS
OXYGEN SATURATION: 98 % | SYSTOLIC BLOOD PRESSURE: 132 MMHG | DIASTOLIC BLOOD PRESSURE: 80 MMHG | RESPIRATION RATE: 16 BRPM | TEMPERATURE: 98 F | HEART RATE: 69 BPM

## 2021-04-23 DIAGNOSIS — R11.0 CHEMOTHERAPY-INDUCED NAUSEA: Primary | ICD-10-CM

## 2021-04-23 DIAGNOSIS — T45.1X5A CHEMOTHERAPY-INDUCED NAUSEA: Primary | ICD-10-CM

## 2021-04-23 DIAGNOSIS — C79.51 SECONDARY CANCER OF BONE: ICD-10-CM

## 2021-04-23 PROCEDURE — 63600175 PHARM REV CODE 636 W HCPCS: Performed by: NURSE PRACTITIONER

## 2021-04-23 PROCEDURE — 96361 HYDRATE IV INFUSION ADD-ON: CPT

## 2021-04-23 PROCEDURE — 25000003 PHARM REV CODE 250: Performed by: NURSE PRACTITIONER

## 2021-04-23 PROCEDURE — 96360 HYDRATION IV INFUSION INIT: CPT

## 2021-04-23 RX ORDER — HEPARIN 100 UNIT/ML
500 SYRINGE INTRAVENOUS
Status: DISCONTINUED | OUTPATIENT
Start: 2021-04-23 | End: 2021-04-23 | Stop reason: HOSPADM

## 2021-04-23 RX ORDER — HEPARIN 100 UNIT/ML
500 SYRINGE INTRAVENOUS
Status: CANCELLED | OUTPATIENT
Start: 2021-04-23

## 2021-04-23 RX ORDER — SODIUM CHLORIDE 9 MG/ML
1000 INJECTION, SOLUTION INTRAVENOUS
Status: COMPLETED | OUTPATIENT
Start: 2021-04-23 | End: 2021-04-23

## 2021-04-23 RX ORDER — SODIUM CHLORIDE 0.9 % (FLUSH) 0.9 %
10 SYRINGE (ML) INJECTION
Status: DISCONTINUED | OUTPATIENT
Start: 2021-04-23 | End: 2021-04-23 | Stop reason: HOSPADM

## 2021-04-23 RX ORDER — SODIUM CHLORIDE 0.9 % (FLUSH) 0.9 %
10 SYRINGE (ML) INJECTION
Status: CANCELLED | OUTPATIENT
Start: 2021-04-23

## 2021-04-23 RX ADMIN — SODIUM CHLORIDE 1000 ML: 0.9 INJECTION, SOLUTION INTRAVENOUS at 10:04

## 2021-04-23 RX ADMIN — HEPARIN 500 UNITS: 100 SYRINGE at 12:04

## 2021-04-24 ENCOUNTER — HOSPITAL ENCOUNTER (OUTPATIENT)
Facility: HOSPITAL | Age: 51
Discharge: HOME | End: 2021-04-25
Attending: FAMILY MEDICINE | Admitting: INTERNAL MEDICINE
Payer: MEDICAID

## 2021-04-24 DIAGNOSIS — Z92.25 INCREASED INFECTION RISK STATUS POST IMMUNOSUPPRESSIVE THERAPY: ICD-10-CM

## 2021-04-24 DIAGNOSIS — L73.2 HYDRADENITIS: Primary | ICD-10-CM

## 2021-04-24 DIAGNOSIS — S31.40XA VAGINAL WOUND, INITIAL ENCOUNTER: ICD-10-CM

## 2021-04-24 LAB
ALBUMIN SERPL BCP-MCNC: 3.6 G/DL (ref 3.5–5.2)
ALP SERPL-CCNC: 84 U/L (ref 55–135)
ALT SERPL W/O P-5'-P-CCNC: 9 U/L (ref 10–44)
ANION GAP SERPL CALC-SCNC: 12 MMOL/L (ref 8–16)
APTT BLDCRRT: 24.8 SEC (ref 21–32)
AST SERPL-CCNC: 13 U/L (ref 10–40)
BASOPHILS NFR BLD: 0 % (ref 0–1.9)
BILIRUB SERPL-MCNC: 0.3 MG/DL (ref 0.1–1)
BILIRUB UR QL STRIP: NEGATIVE
BUN SERPL-MCNC: 7 MG/DL (ref 6–20)
CALCIUM SERPL-MCNC: 9.8 MG/DL (ref 8.7–10.5)
CHLORIDE SERPL-SCNC: 101 MMOL/L (ref 95–110)
CLARITY UR: CLEAR
CO2 SERPL-SCNC: 27 MMOL/L (ref 23–29)
COLOR UR: YELLOW
CREAT SERPL-MCNC: 1 MG/DL (ref 0.5–1.4)
CTP QC/QA: YES
DIFFERENTIAL METHOD: ABNORMAL
EOSINOPHIL NFR BLD: 1 % (ref 0–8)
ERYTHROCYTE [DISTWIDTH] IN BLOOD BY AUTOMATED COUNT: 16.2 % (ref 11.5–14.5)
EST. GFR  (AFRICAN AMERICAN): >60 ML/MIN/1.73 M^2
EST. GFR  (NON AFRICAN AMERICAN): >60 ML/MIN/1.73 M^2
GLUCOSE SERPL-MCNC: 105 MG/DL (ref 70–110)
GLUCOSE UR QL STRIP: NEGATIVE
HCT VFR BLD AUTO: 33.1 % (ref 37–48.5)
HGB BLD-MCNC: 11 G/DL (ref 12–16)
HGB UR QL STRIP: ABNORMAL
HIV 1+2 AB+HIV1 P24 AG SERPL QL IA: NEGATIVE
IMM GRANULOCYTES # BLD AUTO: ABNORMAL K/UL (ref 0–0.04)
IMM GRANULOCYTES NFR BLD AUTO: ABNORMAL % (ref 0–0.5)
INR PPP: 0.9 (ref 0.8–1.2)
KETONES UR QL STRIP: NEGATIVE
LACTATE SERPL-SCNC: 1.5 MMOL/L (ref 0.5–2.2)
LEUKOCYTE ESTERASE UR QL STRIP: NEGATIVE
LYMPHOCYTES NFR BLD: 34 % (ref 18–48)
MCH RBC QN AUTO: 34 PG (ref 27–31)
MCHC RBC AUTO-ENTMCNC: 33.2 G/DL (ref 32–36)
MCV RBC AUTO: 102 FL (ref 82–98)
MICROSCOPIC COMMENT: ABNORMAL
MONOCYTES NFR BLD: 9 % (ref 4–15)
NEUTROPHILS NFR BLD: 52 % (ref 38–73)
NEUTS BAND NFR BLD MANUAL: 4 %
NITRITE UR QL STRIP: NEGATIVE
NRBC BLD-RTO: 0 /100 WBC
PH UR STRIP: 6 [PH] (ref 5–8)
PLATELET # BLD AUTO: 256 K/UL (ref 150–450)
PMV BLD AUTO: 11 FL (ref 9.2–12.9)
POTASSIUM SERPL-SCNC: 3.7 MMOL/L (ref 3.5–5.1)
PROCALCITONIN SERPL IA-MCNC: 0.03 NG/ML
PROT SERPL-MCNC: 7.1 G/DL (ref 6–8.4)
PROT UR QL STRIP: NEGATIVE
PROTHROMBIN TIME: 10.3 SEC (ref 9–12.5)
RBC # BLD AUTO: 3.24 M/UL (ref 4–5.4)
RBC #/AREA URNS HPF: 0 /HPF (ref 0–4)
SARS-COV-2 RDRP RESP QL NAA+PROBE: NEGATIVE
SODIUM SERPL-SCNC: 140 MMOL/L (ref 136–145)
SP GR UR STRIP: 1.01 (ref 1–1.03)
URN SPEC COLLECT METH UR: ABNORMAL
UROBILINOGEN UR STRIP-ACNC: NEGATIVE EU/DL
WBC # BLD AUTO: 6.6 K/UL (ref 3.9–12.7)
YEAST URNS QL MICRO: ABNORMAL

## 2021-04-24 PROCEDURE — 96375 TX/PRO/DX INJ NEW DRUG ADDON: CPT | Mod: 59

## 2021-04-24 PROCEDURE — 96365 THER/PROPH/DIAG IV INF INIT: CPT

## 2021-04-24 PROCEDURE — G0378 HOSPITAL OBSERVATION PER HR: HCPCS

## 2021-04-24 PROCEDURE — 85730 THROMBOPLASTIN TIME PARTIAL: CPT | Performed by: FAMILY MEDICINE

## 2021-04-24 PROCEDURE — 85007 BL SMEAR W/DIFF WBC COUNT: CPT | Mod: NCS | Performed by: FAMILY MEDICINE

## 2021-04-24 PROCEDURE — 63600175 PHARM REV CODE 636 W HCPCS: Performed by: FAMILY MEDICINE

## 2021-04-24 PROCEDURE — U0002 COVID-19 LAB TEST NON-CDC: HCPCS | Performed by: FAMILY MEDICINE

## 2021-04-24 PROCEDURE — 25500020 PHARM REV CODE 255: Performed by: FAMILY MEDICINE

## 2021-04-24 PROCEDURE — 81000 URINALYSIS NONAUTO W/SCOPE: CPT | Performed by: FAMILY MEDICINE

## 2021-04-24 PROCEDURE — 25000003 PHARM REV CODE 250: Performed by: FAMILY MEDICINE

## 2021-04-24 PROCEDURE — 87040 BLOOD CULTURE FOR BACTERIA: CPT | Performed by: FAMILY MEDICINE

## 2021-04-24 PROCEDURE — 85027 COMPLETE CBC AUTOMATED: CPT | Performed by: FAMILY MEDICINE

## 2021-04-24 PROCEDURE — 99291 CRITICAL CARE FIRST HOUR: CPT | Mod: 25

## 2021-04-24 PROCEDURE — 83605 ASSAY OF LACTIC ACID: CPT | Performed by: FAMILY MEDICINE

## 2021-04-24 PROCEDURE — 85610 PROTHROMBIN TIME: CPT | Performed by: FAMILY MEDICINE

## 2021-04-24 PROCEDURE — 80053 COMPREHEN METABOLIC PANEL: CPT | Performed by: FAMILY MEDICINE

## 2021-04-24 PROCEDURE — 84145 PROCALCITONIN (PCT): CPT | Performed by: FAMILY MEDICINE

## 2021-04-24 PROCEDURE — 86703 HIV-1/HIV-2 1 RESULT ANTBDY: CPT | Performed by: EMERGENCY MEDICINE

## 2021-04-24 RX ORDER — HYDROMORPHONE HYDROCHLORIDE 1 MG/ML
0.5 INJECTION, SOLUTION INTRAMUSCULAR; INTRAVENOUS; SUBCUTANEOUS
Status: COMPLETED | OUTPATIENT
Start: 2021-04-24 | End: 2021-04-24

## 2021-04-24 RX ORDER — ONDANSETRON 2 MG/ML
4 INJECTION INTRAMUSCULAR; INTRAVENOUS
Status: COMPLETED | OUTPATIENT
Start: 2021-04-24 | End: 2021-04-24

## 2021-04-24 RX ORDER — LINEZOLID 2 MG/ML
600 INJECTION, SOLUTION INTRAVENOUS
Status: DISCONTINUED | OUTPATIENT
Start: 2021-04-24 | End: 2021-04-25

## 2021-04-24 RX ADMIN — SODIUM CHLORIDE 1000 ML: 0.9 INJECTION, SOLUTION INTRAVENOUS at 09:04

## 2021-04-24 RX ADMIN — IOHEXOL 100 ML: 350 INJECTION, SOLUTION INTRAVENOUS at 10:04

## 2021-04-24 RX ADMIN — HYDROMORPHONE HYDROCHLORIDE 0.5 MG: 1 INJECTION, SOLUTION INTRAMUSCULAR; INTRAVENOUS; SUBCUTANEOUS at 08:04

## 2021-04-24 RX ADMIN — ONDANSETRON 4 MG: 2 INJECTION INTRAMUSCULAR; INTRAVENOUS at 08:04

## 2021-04-24 RX ADMIN — PIPERACILLIN AND TAZOBACTAM 4.5 G: 4; .5 INJECTION, POWDER, LYOPHILIZED, FOR SOLUTION INTRAVENOUS; PARENTERAL at 09:04

## 2021-04-25 VITALS
DIASTOLIC BLOOD PRESSURE: 61 MMHG | BODY MASS INDEX: 32.13 KG/M2 | WEIGHT: 204.69 LBS | HEART RATE: 71 BPM | RESPIRATION RATE: 18 BRPM | OXYGEN SATURATION: 97 % | SYSTOLIC BLOOD PRESSURE: 125 MMHG | TEMPERATURE: 98 F | HEIGHT: 67 IN

## 2021-04-25 PROBLEM — L73.2 HYDRADENITIS: Status: ACTIVE | Noted: 2021-04-24

## 2021-04-25 PROBLEM — B37.9: Status: ACTIVE | Noted: 2021-04-25

## 2021-04-25 LAB
ANION GAP SERPL CALC-SCNC: 12 MMOL/L (ref 8–16)
BASOPHILS # BLD AUTO: 0.04 K/UL (ref 0–0.2)
BASOPHILS NFR BLD: 0.6 % (ref 0–1.9)
BUN SERPL-MCNC: 5 MG/DL (ref 6–20)
CALCIUM SERPL-MCNC: 8.8 MG/DL (ref 8.7–10.5)
CHLORIDE SERPL-SCNC: 103 MMOL/L (ref 95–110)
CO2 SERPL-SCNC: 25 MMOL/L (ref 23–29)
CREAT SERPL-MCNC: 1 MG/DL (ref 0.5–1.4)
DIFFERENTIAL METHOD: ABNORMAL
EOSINOPHIL # BLD AUTO: 0.1 K/UL (ref 0–0.5)
EOSINOPHIL NFR BLD: 1.3 % (ref 0–8)
ERYTHROCYTE [DISTWIDTH] IN BLOOD BY AUTOMATED COUNT: 16.5 % (ref 11.5–14.5)
EST. GFR  (AFRICAN AMERICAN): >60 ML/MIN/1.73 M^2
EST. GFR  (NON AFRICAN AMERICAN): >60 ML/MIN/1.73 M^2
GLUCOSE SERPL-MCNC: 115 MG/DL (ref 70–110)
HCT VFR BLD AUTO: 30.7 % (ref 37–48.5)
HGB BLD-MCNC: 10.2 G/DL (ref 12–16)
IMM GRANULOCYTES # BLD AUTO: 0.25 K/UL (ref 0–0.04)
IMM GRANULOCYTES NFR BLD AUTO: 4 % (ref 0–0.5)
LYMPHOCYTES # BLD AUTO: 1.8 K/UL (ref 1–4.8)
LYMPHOCYTES NFR BLD: 29 % (ref 18–48)
MCH RBC QN AUTO: 34.2 PG (ref 27–31)
MCHC RBC AUTO-ENTMCNC: 33.2 G/DL (ref 32–36)
MCV RBC AUTO: 103 FL (ref 82–98)
MONOCYTES # BLD AUTO: 0.8 K/UL (ref 0.3–1)
MONOCYTES NFR BLD: 12.1 % (ref 4–15)
NEUTROPHILS # BLD AUTO: 3.3 K/UL (ref 1.8–7.7)
NEUTROPHILS NFR BLD: 53 % (ref 38–73)
NRBC BLD-RTO: 0 /100 WBC
PLATELET # BLD AUTO: 215 K/UL (ref 150–450)
PMV BLD AUTO: 11.2 FL (ref 9.2–12.9)
POTASSIUM SERPL-SCNC: 3.4 MMOL/L (ref 3.5–5.1)
RBC # BLD AUTO: 2.98 M/UL (ref 4–5.4)
SODIUM SERPL-SCNC: 140 MMOL/L (ref 136–145)
WBC # BLD AUTO: 6.21 K/UL (ref 3.9–12.7)

## 2021-04-25 PROCEDURE — 25000242 PHARM REV CODE 250 ALT 637 W/ HCPCS: Performed by: NURSE PRACTITIONER

## 2021-04-25 PROCEDURE — 25000003 PHARM REV CODE 250: Performed by: INTERNAL MEDICINE

## 2021-04-25 PROCEDURE — 99220 PR INITIAL OBSERVATION CARE,LEVL III: CPT | Mod: ,,, | Performed by: OBSTETRICS & GYNECOLOGY

## 2021-04-25 PROCEDURE — 80048 BASIC METABOLIC PNL TOTAL CA: CPT | Performed by: NURSE PRACTITIONER

## 2021-04-25 PROCEDURE — G0378 HOSPITAL OBSERVATION PER HR: HCPCS

## 2021-04-25 PROCEDURE — 63600175 PHARM REV CODE 636 W HCPCS: Performed by: INTERNAL MEDICINE

## 2021-04-25 PROCEDURE — 36415 COLL VENOUS BLD VENIPUNCTURE: CPT | Performed by: NURSE PRACTITIONER

## 2021-04-25 PROCEDURE — 63600175 PHARM REV CODE 636 W HCPCS: Performed by: NURSE PRACTITIONER

## 2021-04-25 PROCEDURE — 94640 AIRWAY INHALATION TREATMENT: CPT

## 2021-04-25 PROCEDURE — 94760 N-INVAS EAR/PLS OXIMETRY 1: CPT

## 2021-04-25 PROCEDURE — 99220 PR INITIAL OBSERVATION CARE,LEVL III: ICD-10-PCS | Mod: ,,, | Performed by: OBSTETRICS & GYNECOLOGY

## 2021-04-25 PROCEDURE — 25000003 PHARM REV CODE 250: Performed by: NURSE PRACTITIONER

## 2021-04-25 PROCEDURE — 96376 TX/PRO/DX INJ SAME DRUG ADON: CPT

## 2021-04-25 PROCEDURE — 85025 COMPLETE CBC W/AUTO DIFF WBC: CPT | Performed by: NURSE PRACTITIONER

## 2021-04-25 PROCEDURE — 96375 TX/PRO/DX INJ NEW DRUG ADDON: CPT

## 2021-04-25 RX ORDER — CLINDAMYCIN PHOSPHATE 600 MG/50ML
600 INJECTION, SOLUTION INTRAVENOUS
Status: DISCONTINUED | OUTPATIENT
Start: 2021-04-25 | End: 2021-04-25 | Stop reason: HOSPADM

## 2021-04-25 RX ORDER — SODIUM CHLORIDE 0.9 % (FLUSH) 0.9 %
10 SYRINGE (ML) INJECTION
Status: DISCONTINUED | OUTPATIENT
Start: 2021-04-25 | End: 2021-04-25 | Stop reason: HOSPADM

## 2021-04-25 RX ORDER — PROCHLORPERAZINE EDISYLATE 5 MG/ML
5 INJECTION INTRAMUSCULAR; INTRAVENOUS EVERY 6 HOURS PRN
Status: DISCONTINUED | OUTPATIENT
Start: 2021-04-25 | End: 2021-04-25 | Stop reason: HOSPADM

## 2021-04-25 RX ORDER — HYDROCODONE BITARTRATE AND ACETAMINOPHEN 10; 325 MG/1; MG/1
1 TABLET ORAL EVERY 4 HOURS PRN
Status: DISCONTINUED | OUTPATIENT
Start: 2021-04-25 | End: 2021-04-25 | Stop reason: HOSPADM

## 2021-04-25 RX ORDER — FLUCONAZOLE 2 MG/ML
200 INJECTION, SOLUTION INTRAVENOUS
Status: DISCONTINUED | OUTPATIENT
Start: 2021-04-25 | End: 2021-04-25

## 2021-04-25 RX ORDER — POTASSIUM CHLORIDE 20 MEQ/1
40 TABLET, EXTENDED RELEASE ORAL ONCE
Status: DISCONTINUED | OUTPATIENT
Start: 2021-04-25 | End: 2021-04-25

## 2021-04-25 RX ORDER — IBUPROFEN 200 MG
16 TABLET ORAL
Status: DISCONTINUED | OUTPATIENT
Start: 2021-04-25 | End: 2021-04-25 | Stop reason: HOSPADM

## 2021-04-25 RX ORDER — BUDESONIDE 0.5 MG/2ML
0.5 INHALANT ORAL 2 TIMES DAILY
Status: DISCONTINUED | OUTPATIENT
Start: 2021-04-25 | End: 2021-04-25 | Stop reason: HOSPADM

## 2021-04-25 RX ORDER — TALC
6 POWDER (GRAM) TOPICAL NIGHTLY PRN
Status: DISCONTINUED | OUTPATIENT
Start: 2021-04-25 | End: 2021-04-25 | Stop reason: HOSPADM

## 2021-04-25 RX ORDER — LEVOTHYROXINE SODIUM 150 UG/1
150 TABLET ORAL
Status: DISCONTINUED | OUTPATIENT
Start: 2021-04-25 | End: 2021-04-25 | Stop reason: HOSPADM

## 2021-04-25 RX ORDER — IBUPROFEN 200 MG
24 TABLET ORAL
Status: DISCONTINUED | OUTPATIENT
Start: 2021-04-25 | End: 2021-04-25 | Stop reason: HOSPADM

## 2021-04-25 RX ORDER — MORPHINE SULFATE 15 MG/1
15 TABLET, FILM COATED, EXTENDED RELEASE ORAL EVERY 12 HOURS
Status: DISCONTINUED | OUTPATIENT
Start: 2021-04-25 | End: 2021-04-25 | Stop reason: HOSPADM

## 2021-04-25 RX ORDER — ONDANSETRON 2 MG/ML
4 INJECTION INTRAMUSCULAR; INTRAVENOUS EVERY 8 HOURS PRN
Status: DISCONTINUED | OUTPATIENT
Start: 2021-04-25 | End: 2021-04-25 | Stop reason: HOSPADM

## 2021-04-25 RX ORDER — ACETAMINOPHEN 325 MG/1
650 TABLET ORAL EVERY 8 HOURS PRN
Status: DISCONTINUED | OUTPATIENT
Start: 2021-04-25 | End: 2021-04-25 | Stop reason: HOSPADM

## 2021-04-25 RX ORDER — ACETAMINOPHEN 325 MG/1
650 TABLET ORAL EVERY 4 HOURS PRN
Status: DISCONTINUED | OUTPATIENT
Start: 2021-04-25 | End: 2021-04-25 | Stop reason: HOSPADM

## 2021-04-25 RX ORDER — ALPRAZOLAM 0.5 MG/1
0.5 TABLET ORAL 2 TIMES DAILY PRN
Status: DISCONTINUED | OUTPATIENT
Start: 2021-04-25 | End: 2021-04-25 | Stop reason: HOSPADM

## 2021-04-25 RX ORDER — GLUCAGON 1 MG
1 KIT INJECTION
Status: DISCONTINUED | OUTPATIENT
Start: 2021-04-25 | End: 2021-04-25 | Stop reason: HOSPADM

## 2021-04-25 RX ORDER — SERTRALINE HYDROCHLORIDE 25 MG/1
25 TABLET, FILM COATED ORAL DAILY
Status: DISCONTINUED | OUTPATIENT
Start: 2021-04-25 | End: 2021-04-25 | Stop reason: HOSPADM

## 2021-04-25 RX ORDER — DOXYCYCLINE 100 MG/1
100 CAPSULE ORAL 2 TIMES DAILY
Qty: 20 CAPSULE | Refills: 0 | Status: SHIPPED | OUTPATIENT
Start: 2021-04-25 | End: 2021-05-05

## 2021-04-25 RX ORDER — IPRATROPIUM BROMIDE AND ALBUTEROL SULFATE 2.5; .5 MG/3ML; MG/3ML
3 SOLUTION RESPIRATORY (INHALATION) EVERY 6 HOURS PRN
Status: DISCONTINUED | OUTPATIENT
Start: 2021-04-25 | End: 2021-04-25 | Stop reason: HOSPADM

## 2021-04-25 RX ADMIN — LEVOTHYROXINE SODIUM 150 MCG: 0.15 TABLET ORAL at 06:04

## 2021-04-25 RX ADMIN — FLUCONAZOLE 200 MG: 2 INJECTION, SOLUTION INTRAVENOUS at 02:04

## 2021-04-25 RX ADMIN — BUDESONIDE 0.5 MG: 0.5 SUSPENSION RESPIRATORY (INHALATION) at 08:04

## 2021-04-25 RX ADMIN — LINEZOLID 600 MG: 600 INJECTION, SOLUTION INTRAVENOUS at 12:04

## 2021-04-25 RX ADMIN — PIPERACILLIN AND TAZOBACTAM 4.5 G: 4; .5 INJECTION, POWDER, LYOPHILIZED, FOR SOLUTION INTRAVENOUS; PARENTERAL at 06:04

## 2021-04-25 RX ADMIN — SERTRALINE HYDROCHLORIDE 25 MG: 25 TABLET ORAL at 08:04

## 2021-04-25 RX ADMIN — MORPHINE SULFATE 15 MG: 15 TABLET, FILM COATED, EXTENDED RELEASE ORAL at 08:04

## 2021-04-25 RX ADMIN — CLINDAMYCIN IN 5 PERCENT DEXTROSE 600 MG: 12 INJECTION, SOLUTION INTRAVENOUS at 10:04

## 2021-04-25 RX ADMIN — POTASSIUM BICARBONATE 50 MEQ: 977.5 TABLET, EFFERVESCENT ORAL at 10:04

## 2021-04-26 ENCOUNTER — HOSPITAL ENCOUNTER (OUTPATIENT)
Dept: RADIOLOGY | Facility: HOSPITAL | Age: 51
Discharge: HOME | End: 2021-04-26
Attending: INTERNAL MEDICINE
Payer: MEDICAID

## 2021-04-26 DIAGNOSIS — C53.0 MALIGNANT NEOPLASM OF ENDOCERVIX: ICD-10-CM

## 2021-04-26 PROCEDURE — 78815 PET IMAGE W/CT SKULL-THIGH: CPT | Mod: 26,PI,, | Performed by: RADIOLOGY

## 2021-04-26 PROCEDURE — 78815 NM PET CT ROUTINE: ICD-10-PCS | Mod: 26,PI,, | Performed by: RADIOLOGY

## 2021-04-26 PROCEDURE — 78815 PET IMAGE W/CT SKULL-THIGH: CPT | Mod: TC

## 2021-04-27 ENCOUNTER — TELEPHONE (OUTPATIENT)
Dept: HEMATOLOGY/ONCOLOGY | Facility: CLINIC | Age: 51
End: 2021-04-27

## 2021-04-27 ENCOUNTER — OFFICE VISIT (OUTPATIENT)
Dept: HEMATOLOGY/ONCOLOGY | Facility: CLINIC | Age: 51
End: 2021-04-27
Payer: MEDICAID

## 2021-04-27 VITALS
HEART RATE: 95 BPM | TEMPERATURE: 98 F | BODY MASS INDEX: 32.32 KG/M2 | SYSTOLIC BLOOD PRESSURE: 111 MMHG | HEIGHT: 67 IN | WEIGHT: 205.94 LBS | OXYGEN SATURATION: 99 % | DIASTOLIC BLOOD PRESSURE: 78 MMHG

## 2021-04-27 DIAGNOSIS — C53.0 MALIGNANT NEOPLASM OF ENDOCERVIX: ICD-10-CM

## 2021-04-27 DIAGNOSIS — L02.91 CUTANEOUS ABSCESS, UNSPECIFIED SITE: Primary | ICD-10-CM

## 2021-04-27 DIAGNOSIS — E83.42 HYPOMAGNESEMIA: ICD-10-CM

## 2021-04-27 DIAGNOSIS — Z17.0 MALIGNANT NEOPLASM OF OVERLAPPING SITES OF RIGHT BREAST IN FEMALE, ESTROGEN RECEPTOR POSITIVE: ICD-10-CM

## 2021-04-27 DIAGNOSIS — C79.51 SECONDARY CANCER OF BONE: ICD-10-CM

## 2021-04-27 DIAGNOSIS — C50.811 MALIGNANT NEOPLASM OF OVERLAPPING SITES OF RIGHT BREAST IN FEMALE, ESTROGEN RECEPTOR POSITIVE: ICD-10-CM

## 2021-04-27 PROCEDURE — 99215 PR OFFICE/OUTPT VISIT, EST, LEVL V, 40-54 MIN: ICD-10-PCS | Mod: S$PBB,,, | Performed by: INTERNAL MEDICINE

## 2021-04-27 PROCEDURE — 99999 PR PBB SHADOW E&M-EST. PATIENT-LVL III: ICD-10-PCS | Mod: PBBFAC,,, | Performed by: INTERNAL MEDICINE

## 2021-04-27 PROCEDURE — 99215 OFFICE O/P EST HI 40 MIN: CPT | Mod: S$PBB,,, | Performed by: INTERNAL MEDICINE

## 2021-04-27 PROCEDURE — 99999 PR PBB SHADOW E&M-EST. PATIENT-LVL III: CPT | Mod: PBBFAC,,, | Performed by: INTERNAL MEDICINE

## 2021-04-27 PROCEDURE — 99213 OFFICE O/P EST LOW 20 MIN: CPT | Mod: PBBFAC | Performed by: INTERNAL MEDICINE

## 2021-04-27 RX ORDER — LANOLIN ALCOHOL/MO/W.PET/CERES
400 CREAM (GRAM) TOPICAL DAILY
Qty: 5 TABLET | Refills: 1 | Status: SHIPPED | OUTPATIENT
Start: 2021-04-27 | End: 2022-04-27

## 2021-04-28 ENCOUNTER — TELEPHONE (OUTPATIENT)
Dept: DERMATOLOGY | Facility: CLINIC | Age: 51
End: 2021-04-28

## 2021-04-30 LAB
BACTERIA BLD CULT: NORMAL
BACTERIA BLD CULT: NORMAL

## 2021-05-05 DIAGNOSIS — G89.3 CANCER ASSOCIATED PAIN: ICD-10-CM

## 2021-05-05 RX ORDER — HYDROCODONE BITARTRATE AND ACETAMINOPHEN 10; 325 MG/1; MG/1
1 TABLET ORAL EVERY 4 HOURS PRN
Qty: 120 TABLET | Refills: 0 | Status: SHIPPED | OUTPATIENT
Start: 2021-05-05 | End: 2021-05-22 | Stop reason: SDUPTHER

## 2021-05-06 ENCOUNTER — OFFICE VISIT (OUTPATIENT)
Dept: DERMATOLOGY | Facility: CLINIC | Age: 51
End: 2021-05-06
Payer: MEDICAID

## 2021-05-06 DIAGNOSIS — L02.91 CUTANEOUS ABSCESS, UNSPECIFIED SITE: ICD-10-CM

## 2021-05-06 DIAGNOSIS — L73.2 HIDRADENITIS SUPPURATIVA: Primary | ICD-10-CM

## 2021-05-06 PROCEDURE — 99204 OFFICE O/P NEW MOD 45 MIN: CPT | Mod: S$PBB,,, | Performed by: DERMATOLOGY

## 2021-05-06 PROCEDURE — 99204 PR OFFICE/OUTPT VISIT, NEW, LEVL IV, 45-59 MIN: ICD-10-PCS | Mod: S$PBB,,, | Performed by: DERMATOLOGY

## 2021-05-06 PROCEDURE — 99214 OFFICE O/P EST MOD 30 MIN: CPT | Mod: PBBFAC,PO | Performed by: DERMATOLOGY

## 2021-05-06 PROCEDURE — 99999 PR PBB SHADOW E&M-EST. PATIENT-LVL IV: CPT | Mod: PBBFAC,,, | Performed by: DERMATOLOGY

## 2021-05-06 PROCEDURE — 87070 CULTURE OTHR SPECIMN AEROBIC: CPT | Performed by: DERMATOLOGY

## 2021-05-06 PROCEDURE — 99999 PR PBB SHADOW E&M-EST. PATIENT-LVL IV: ICD-10-PCS | Mod: PBBFAC,,, | Performed by: DERMATOLOGY

## 2021-05-06 RX ORDER — DOXYCYCLINE 100 MG/1
100 TABLET ORAL 2 TIMES DAILY WITH MEALS
Qty: 30 TABLET | Refills: 2 | Status: SHIPPED | OUTPATIENT
Start: 2021-05-06 | End: 2021-05-17

## 2021-05-06 RX ORDER — CLINDAMYCIN PHOSPHATE 11.9 MG/ML
SOLUTION TOPICAL 2 TIMES DAILY
Qty: 60 ML | Refills: 5 | Status: SHIPPED | OUTPATIENT
Start: 2021-05-06 | End: 2021-09-14

## 2021-05-07 ENCOUNTER — SPECIALTY PHARMACY (OUTPATIENT)
Dept: PHARMACY | Facility: CLINIC | Age: 51
End: 2021-05-07

## 2021-05-10 LAB — BACTERIA SPEC AEROBE CULT: NO GROWTH

## 2021-05-11 ENCOUNTER — INFUSION (OUTPATIENT)
Dept: INFUSION THERAPY | Facility: HOSPITAL | Age: 51
End: 2021-05-11
Attending: INTERNAL MEDICINE
Payer: MEDICAID

## 2021-05-11 ENCOUNTER — OFFICE VISIT (OUTPATIENT)
Dept: HEMATOLOGY/ONCOLOGY | Facility: CLINIC | Age: 51
End: 2021-05-11
Payer: MEDICAID

## 2021-05-11 ENCOUNTER — TELEPHONE (OUTPATIENT)
Dept: PHARMACY | Facility: CLINIC | Age: 51
End: 2021-05-11

## 2021-05-11 VITALS
OXYGEN SATURATION: 98 % | HEIGHT: 67 IN | DIASTOLIC BLOOD PRESSURE: 82 MMHG | BODY MASS INDEX: 32.11 KG/M2 | HEART RATE: 88 BPM | SYSTOLIC BLOOD PRESSURE: 126 MMHG | TEMPERATURE: 98 F | WEIGHT: 204.56 LBS

## 2021-05-11 VITALS
OXYGEN SATURATION: 97 % | RESPIRATION RATE: 16 BRPM | HEART RATE: 75 BPM | DIASTOLIC BLOOD PRESSURE: 82 MMHG | TEMPERATURE: 98 F | SYSTOLIC BLOOD PRESSURE: 122 MMHG

## 2021-05-11 DIAGNOSIS — Z17.0 MALIGNANT NEOPLASM OF OVERLAPPING SITES OF RIGHT BREAST IN FEMALE, ESTROGEN RECEPTOR POSITIVE: ICD-10-CM

## 2021-05-11 DIAGNOSIS — C79.51 SECONDARY CANCER OF BONE: ICD-10-CM

## 2021-05-11 DIAGNOSIS — C50.811 MALIGNANT NEOPLASM OF OVERLAPPING SITES OF RIGHT BREAST IN FEMALE, ESTROGEN RECEPTOR POSITIVE: ICD-10-CM

## 2021-05-11 DIAGNOSIS — C53.0 MALIGNANT NEOPLASM OF ENDOCERVIX: Primary | ICD-10-CM

## 2021-05-11 DIAGNOSIS — C77.3 MALIGNANT NEOPLASM METASTATIC TO LYMPH NODE OF AXILLA: Primary | ICD-10-CM

## 2021-05-11 PROCEDURE — 25000003 PHARM REV CODE 250: Performed by: INTERNAL MEDICINE

## 2021-05-11 PROCEDURE — 96417 CHEMO IV INFUS EACH ADDL SEQ: CPT

## 2021-05-11 PROCEDURE — 96415 CHEMO IV INFUSION ADDL HR: CPT

## 2021-05-11 PROCEDURE — 96366 THER/PROPH/DIAG IV INF ADDON: CPT

## 2021-05-11 PROCEDURE — 96413 CHEMO IV INFUSION 1 HR: CPT

## 2021-05-11 PROCEDURE — 63600175 PHARM REV CODE 636 W HCPCS: Performed by: INTERNAL MEDICINE

## 2021-05-11 PROCEDURE — 99215 OFFICE O/P EST HI 40 MIN: CPT | Mod: PBBFAC,25 | Performed by: INTERNAL MEDICINE

## 2021-05-11 PROCEDURE — 99215 PR OFFICE/OUTPT VISIT, EST, LEVL V, 40-54 MIN: ICD-10-PCS | Mod: S$PBB,,, | Performed by: INTERNAL MEDICINE

## 2021-05-11 PROCEDURE — 99999 PR PBB SHADOW E&M-EST. PATIENT-LVL V: ICD-10-PCS | Mod: PBBFAC,,, | Performed by: INTERNAL MEDICINE

## 2021-05-11 PROCEDURE — 96367 TX/PROPH/DG ADDL SEQ IV INF: CPT

## 2021-05-11 PROCEDURE — 99999 PR PBB SHADOW E&M-EST. PATIENT-LVL V: CPT | Mod: PBBFAC,,, | Performed by: INTERNAL MEDICINE

## 2021-05-11 PROCEDURE — 96375 TX/PRO/DX INJ NEW DRUG ADDON: CPT

## 2021-05-11 PROCEDURE — 99215 OFFICE O/P EST HI 40 MIN: CPT | Mod: S$PBB,,, | Performed by: INTERNAL MEDICINE

## 2021-05-11 RX ORDER — HEPARIN 100 UNIT/ML
500 SYRINGE INTRAVENOUS
Status: CANCELLED | OUTPATIENT
Start: 2021-05-18

## 2021-05-11 RX ORDER — HEPARIN 100 UNIT/ML
500 SYRINGE INTRAVENOUS
Status: DISCONTINUED | OUTPATIENT
Start: 2021-05-11 | End: 2021-05-11 | Stop reason: HOSPADM

## 2021-05-11 RX ORDER — HEPARIN 100 UNIT/ML
500 SYRINGE INTRAVENOUS
Status: CANCELLED | OUTPATIENT
Start: 2021-05-12

## 2021-05-11 RX ORDER — SODIUM CHLORIDE 0.9 % (FLUSH) 0.9 %
10 SYRINGE (ML) INJECTION
Status: CANCELLED | OUTPATIENT
Start: 2021-05-24

## 2021-05-11 RX ORDER — FAMOTIDINE 10 MG/ML
20 INJECTION INTRAVENOUS
Status: CANCELLED | OUTPATIENT
Start: 2021-05-11

## 2021-05-11 RX ORDER — EPINEPHRINE 0.3 MG/.3ML
0.3 INJECTION SUBCUTANEOUS ONCE AS NEEDED
Status: CANCELLED | OUTPATIENT
Start: 2021-05-11

## 2021-05-11 RX ORDER — DIPHENHYDRAMINE HYDROCHLORIDE 50 MG/ML
50 INJECTION INTRAMUSCULAR; INTRAVENOUS ONCE AS NEEDED
Status: CANCELLED | OUTPATIENT
Start: 2021-05-11

## 2021-05-11 RX ORDER — SODIUM CHLORIDE 0.9 % (FLUSH) 0.9 %
10 SYRINGE (ML) INJECTION
Status: CANCELLED | OUTPATIENT
Start: 2021-05-18

## 2021-05-11 RX ORDER — HEPARIN 100 UNIT/ML
500 SYRINGE INTRAVENOUS
Status: CANCELLED | OUTPATIENT
Start: 2021-05-11

## 2021-05-11 RX ORDER — SODIUM CHLORIDE 0.9 % (FLUSH) 0.9 %
10 SYRINGE (ML) INJECTION
Status: CANCELLED | OUTPATIENT
Start: 2021-05-12

## 2021-05-11 RX ORDER — HEPARIN 100 UNIT/ML
500 SYRINGE INTRAVENOUS
Status: CANCELLED | OUTPATIENT
Start: 2021-05-24

## 2021-05-11 RX ORDER — SODIUM CHLORIDE 0.9 % (FLUSH) 0.9 %
10 SYRINGE (ML) INJECTION
Status: CANCELLED | OUTPATIENT
Start: 2021-05-11

## 2021-05-11 RX ORDER — FAMOTIDINE 10 MG/ML
20 INJECTION INTRAVENOUS
Status: COMPLETED | OUTPATIENT
Start: 2021-05-11 | End: 2021-05-11

## 2021-05-11 RX ADMIN — POTASSIUM CHLORIDE: 2 INJECTION, SOLUTION, CONCENTRATE INTRAVENOUS at 09:05

## 2021-05-11 RX ADMIN — PALONOSETRON HYDROCHLORIDE 0.25 MG: 0.25 INJECTION, SOLUTION INTRAVENOUS at 11:05

## 2021-05-11 RX ADMIN — HEPARIN 500 UNITS: 100 SYRINGE at 04:05

## 2021-05-11 RX ADMIN — APREPITANT 130 MG: 130 INJECTION, EMULSION INTRAVENOUS at 11:05

## 2021-05-11 RX ADMIN — FAMOTIDINE 20 MG: 10 INJECTION INTRAVENOUS at 11:05

## 2021-05-11 RX ADMIN — DIPHENHYDRAMINE HYDROCHLORIDE 50 MG: 50 INJECTION, SOLUTION INTRAMUSCULAR; INTRAVENOUS at 12:05

## 2021-05-11 RX ADMIN — PACLITAXEL 378 MG: 6 INJECTION, SOLUTION INTRAVENOUS at 12:05

## 2021-05-11 RX ADMIN — CISPLATIN 108 MG: 100 INJECTION, SOLUTION INTRAVENOUS at 03:05

## 2021-05-12 ENCOUNTER — INFUSION (OUTPATIENT)
Dept: INFUSION THERAPY | Facility: HOSPITAL | Age: 51
End: 2021-05-12
Attending: INTERNAL MEDICINE
Payer: MEDICAID

## 2021-05-12 VITALS
TEMPERATURE: 98 F | HEART RATE: 67 BPM | RESPIRATION RATE: 18 BRPM | SYSTOLIC BLOOD PRESSURE: 124 MMHG | OXYGEN SATURATION: 98 % | DIASTOLIC BLOOD PRESSURE: 76 MMHG

## 2021-05-12 DIAGNOSIS — R11.0 CHEMOTHERAPY-INDUCED NAUSEA: Primary | ICD-10-CM

## 2021-05-12 DIAGNOSIS — T45.1X5A CHEMOTHERAPY-INDUCED NAUSEA: Primary | ICD-10-CM

## 2021-05-12 DIAGNOSIS — C79.51 SECONDARY CANCER OF BONE: ICD-10-CM

## 2021-05-12 DIAGNOSIS — C53.0 MALIGNANT NEOPLASM OF ENDOCERVIX: ICD-10-CM

## 2021-05-12 PROCEDURE — 25000003 PHARM REV CODE 250: Performed by: INTERNAL MEDICINE

## 2021-05-12 PROCEDURE — 96413 CHEMO IV INFUSION 1 HR: CPT

## 2021-05-12 PROCEDURE — 63600175 PHARM REV CODE 636 W HCPCS: Performed by: NURSE PRACTITIONER

## 2021-05-12 PROCEDURE — 96361 HYDRATE IV INFUSION ADD-ON: CPT

## 2021-05-12 PROCEDURE — 25000003 PHARM REV CODE 250: Performed by: NURSE PRACTITIONER

## 2021-05-12 PROCEDURE — 63600175 PHARM REV CODE 636 W HCPCS: Mod: JG | Performed by: INTERNAL MEDICINE

## 2021-05-12 RX ORDER — SODIUM CHLORIDE 0.9 % (FLUSH) 0.9 %
10 SYRINGE (ML) INJECTION
Status: CANCELLED | OUTPATIENT
Start: 2021-05-12

## 2021-05-12 RX ORDER — HEPARIN 100 UNIT/ML
500 SYRINGE INTRAVENOUS
Status: CANCELLED | OUTPATIENT
Start: 2021-05-12

## 2021-05-12 RX ORDER — HEPARIN 100 UNIT/ML
500 SYRINGE INTRAVENOUS
Status: DISCONTINUED | OUTPATIENT
Start: 2021-05-12 | End: 2021-05-12 | Stop reason: HOSPADM

## 2021-05-12 RX ORDER — SODIUM CHLORIDE 9 MG/ML
1000 INJECTION, SOLUTION INTRAVENOUS
Status: COMPLETED | OUTPATIENT
Start: 2021-05-12 | End: 2021-05-12

## 2021-05-12 RX ADMIN — HEPARIN 500 UNITS: 100 SYRINGE at 03:05

## 2021-05-12 RX ADMIN — BEVACIZUMAB 1480 MG: 400 INJECTION, SOLUTION INTRAVENOUS at 01:05

## 2021-05-12 RX ADMIN — SODIUM CHLORIDE 1000 ML: 0.9 INJECTION, SOLUTION INTRAVENOUS at 01:05

## 2021-05-14 ENCOUNTER — INFUSION (OUTPATIENT)
Dept: INFUSION THERAPY | Facility: HOSPITAL | Age: 51
End: 2021-05-14
Attending: INTERNAL MEDICINE
Payer: MEDICAID

## 2021-05-14 VITALS
DIASTOLIC BLOOD PRESSURE: 73 MMHG | TEMPERATURE: 98 F | HEART RATE: 64 BPM | OXYGEN SATURATION: 98 % | SYSTOLIC BLOOD PRESSURE: 116 MMHG | RESPIRATION RATE: 16 BRPM

## 2021-05-14 DIAGNOSIS — T45.1X5A CHEMOTHERAPY-INDUCED NAUSEA: ICD-10-CM

## 2021-05-14 DIAGNOSIS — C79.51 SECONDARY CANCER OF BONE: ICD-10-CM

## 2021-05-14 DIAGNOSIS — C53.0 MALIGNANT NEOPLASM OF ENDOCERVIX: Primary | ICD-10-CM

## 2021-05-14 DIAGNOSIS — R11.2 NAUSEA & VOMITING: ICD-10-CM

## 2021-05-14 DIAGNOSIS — E86.0 DEHYDRATION: ICD-10-CM

## 2021-05-14 DIAGNOSIS — R11.0 CHEMOTHERAPY-INDUCED NAUSEA: ICD-10-CM

## 2021-05-14 PROCEDURE — 25000003 PHARM REV CODE 250: Performed by: NURSE PRACTITIONER

## 2021-05-14 PROCEDURE — 25000003 PHARM REV CODE 250: Performed by: INTERNAL MEDICINE

## 2021-05-14 PROCEDURE — 63600175 PHARM REV CODE 636 W HCPCS: Performed by: NURSE PRACTITIONER

## 2021-05-14 PROCEDURE — 96361 HYDRATE IV INFUSION ADD-ON: CPT

## 2021-05-14 PROCEDURE — 96365 THER/PROPH/DIAG IV INF INIT: CPT

## 2021-05-14 PROCEDURE — 63600175 PHARM REV CODE 636 W HCPCS: Performed by: INTERNAL MEDICINE

## 2021-05-14 RX ORDER — SODIUM CHLORIDE 0.9 % (FLUSH) 0.9 %
10 SYRINGE (ML) INJECTION
Status: CANCELLED | OUTPATIENT
Start: 2021-05-14

## 2021-05-14 RX ORDER — SODIUM CHLORIDE 9 MG/ML
1000 INJECTION, SOLUTION INTRAVENOUS
Status: COMPLETED | OUTPATIENT
Start: 2021-05-14 | End: 2021-05-14

## 2021-05-14 RX ORDER — HEPARIN 100 UNIT/ML
500 SYRINGE INTRAVENOUS
Status: CANCELLED | OUTPATIENT
Start: 2021-05-14

## 2021-05-14 RX ORDER — HEPARIN 100 UNIT/ML
500 SYRINGE INTRAVENOUS
Status: DISCONTINUED | OUTPATIENT
Start: 2021-05-14 | End: 2021-05-14 | Stop reason: HOSPADM

## 2021-05-14 RX ADMIN — HEPARIN 500 UNITS: 100 SYRINGE at 03:05

## 2021-05-14 RX ADMIN — PROMETHAZINE HYDROCHLORIDE 12.5 MG: 25 INJECTION INTRAMUSCULAR; INTRAVENOUS at 01:05

## 2021-05-14 RX ADMIN — SODIUM CHLORIDE 1000 ML: 0.9 INJECTION, SOLUTION INTRAVENOUS at 01:05

## 2021-05-15 ENCOUNTER — HOSPITAL ENCOUNTER (EMERGENCY)
Facility: HOSPITAL | Age: 51
Discharge: HOME | End: 2021-05-15
Attending: EMERGENCY MEDICINE
Payer: MEDICAID

## 2021-05-15 VITALS
DIASTOLIC BLOOD PRESSURE: 68 MMHG | TEMPERATURE: 98 F | OXYGEN SATURATION: 97 % | SYSTOLIC BLOOD PRESSURE: 122 MMHG | BODY MASS INDEX: 34.16 KG/M2 | RESPIRATION RATE: 16 BRPM | HEART RATE: 71 BPM | HEIGHT: 67 IN | WEIGHT: 217.63 LBS

## 2021-05-15 DIAGNOSIS — E86.0 DEHYDRATION: Primary | ICD-10-CM

## 2021-05-15 DIAGNOSIS — D64.9 ANEMIA, UNSPECIFIED TYPE: ICD-10-CM

## 2021-05-15 LAB
ALBUMIN SERPL BCP-MCNC: 3.6 G/DL (ref 3.5–5.2)
ALP SERPL-CCNC: 52 U/L (ref 55–135)
ALT SERPL W/O P-5'-P-CCNC: 22 U/L (ref 10–44)
ANION GAP SERPL CALC-SCNC: 12 MMOL/L (ref 8–16)
AST SERPL-CCNC: 18 U/L (ref 10–40)
BASOPHILS # BLD AUTO: 0.01 K/UL (ref 0–0.2)
BASOPHILS NFR BLD: 0.1 % (ref 0–1.9)
BILIRUB SERPL-MCNC: 0.5 MG/DL (ref 0.1–1)
BILIRUB UR QL STRIP: NEGATIVE
BUN SERPL-MCNC: 26 MG/DL (ref 6–20)
CALCIUM SERPL-MCNC: 8.3 MG/DL (ref 8.7–10.5)
CHLORIDE SERPL-SCNC: 98 MMOL/L (ref 95–110)
CLARITY UR: CLEAR
CO2 SERPL-SCNC: 25 MMOL/L (ref 23–29)
COLOR UR: YELLOW
CREAT SERPL-MCNC: 1.1 MG/DL (ref 0.5–1.4)
DIFFERENTIAL METHOD: ABNORMAL
EOSINOPHIL # BLD AUTO: 0 K/UL (ref 0–0.5)
EOSINOPHIL NFR BLD: 0.5 % (ref 0–8)
ERYTHROCYTE [DISTWIDTH] IN BLOOD BY AUTOMATED COUNT: 15.1 % (ref 11.5–14.5)
EST. GFR  (AFRICAN AMERICAN): >60 ML/MIN/1.73 M^2
EST. GFR  (NON AFRICAN AMERICAN): 58 ML/MIN/1.73 M^2
GLUCOSE SERPL-MCNC: 87 MG/DL (ref 70–110)
GLUCOSE UR QL STRIP: NEGATIVE
HCT VFR BLD AUTO: 34.5 % (ref 37–48.5)
HGB BLD-MCNC: 11.6 G/DL (ref 12–16)
HGB UR QL STRIP: ABNORMAL
IMM GRANULOCYTES # BLD AUTO: 0.05 K/UL (ref 0–0.04)
IMM GRANULOCYTES NFR BLD AUTO: 0.6 % (ref 0–0.5)
KETONES UR QL STRIP: NEGATIVE
LEUKOCYTE ESTERASE UR QL STRIP: NEGATIVE
LYMPHOCYTES # BLD AUTO: 1.2 K/UL (ref 1–4.8)
LYMPHOCYTES NFR BLD: 15.3 % (ref 18–48)
MAGNESIUM SERPL-MCNC: 2 MG/DL (ref 1.6–2.6)
MCH RBC QN AUTO: 33.5 PG (ref 27–31)
MCHC RBC AUTO-ENTMCNC: 33.6 G/DL (ref 32–36)
MCV RBC AUTO: 100 FL (ref 82–98)
MONOCYTES # BLD AUTO: 0.1 K/UL (ref 0.3–1)
MONOCYTES NFR BLD: 1.4 % (ref 4–15)
NEUTROPHILS # BLD AUTO: 6.4 K/UL (ref 1.8–7.7)
NEUTROPHILS NFR BLD: 82.1 % (ref 38–73)
NITRITE UR QL STRIP: NEGATIVE
NRBC BLD-RTO: 0 /100 WBC
PH UR STRIP: 6 [PH] (ref 5–8)
PLATELET # BLD AUTO: 149 K/UL (ref 150–450)
PMV BLD AUTO: 12.3 FL (ref 9.2–12.9)
POTASSIUM SERPL-SCNC: 3.7 MMOL/L (ref 3.5–5.1)
PROT SERPL-MCNC: 6.8 G/DL (ref 6–8.4)
PROT UR QL STRIP: NEGATIVE
RBC # BLD AUTO: 3.46 M/UL (ref 4–5.4)
SODIUM SERPL-SCNC: 135 MMOL/L (ref 136–145)
SP GR UR STRIP: 1.01 (ref 1–1.03)
URN SPEC COLLECT METH UR: ABNORMAL
UROBILINOGEN UR STRIP-ACNC: NEGATIVE EU/DL
WBC # BLD AUTO: 7.85 K/UL (ref 3.9–12.7)

## 2021-05-15 PROCEDURE — 25000003 PHARM REV CODE 250: Performed by: EMERGENCY MEDICINE

## 2021-05-15 PROCEDURE — 83735 ASSAY OF MAGNESIUM: CPT | Performed by: EMERGENCY MEDICINE

## 2021-05-15 PROCEDURE — 80053 COMPREHEN METABOLIC PANEL: CPT | Performed by: EMERGENCY MEDICINE

## 2021-05-15 PROCEDURE — 99284 EMERGENCY DEPT VISIT MOD MDM: CPT | Mod: 25

## 2021-05-15 PROCEDURE — 96375 TX/PRO/DX INJ NEW DRUG ADDON: CPT

## 2021-05-15 PROCEDURE — 63600175 PHARM REV CODE 636 W HCPCS: Performed by: EMERGENCY MEDICINE

## 2021-05-15 PROCEDURE — 81003 URINALYSIS AUTO W/O SCOPE: CPT | Performed by: EMERGENCY MEDICINE

## 2021-05-15 PROCEDURE — 96374 THER/PROPH/DIAG INJ IV PUSH: CPT

## 2021-05-15 PROCEDURE — 85025 COMPLETE CBC W/AUTO DIFF WBC: CPT | Performed by: EMERGENCY MEDICINE

## 2021-05-15 PROCEDURE — 96361 HYDRATE IV INFUSION ADD-ON: CPT

## 2021-05-15 RX ORDER — MORPHINE SULFATE 4 MG/ML
4 INJECTION, SOLUTION INTRAMUSCULAR; INTRAVENOUS
Status: COMPLETED | OUTPATIENT
Start: 2021-05-15 | End: 2021-05-15

## 2021-05-15 RX ORDER — HEPARIN 100 UNIT/ML
5 SYRINGE INTRAVENOUS
Status: COMPLETED | OUTPATIENT
Start: 2021-05-15 | End: 2021-05-15

## 2021-05-15 RX ORDER — ONDANSETRON 2 MG/ML
4 INJECTION INTRAMUSCULAR; INTRAVENOUS ONCE
Status: COMPLETED | OUTPATIENT
Start: 2021-05-15 | End: 2021-05-15

## 2021-05-15 RX ADMIN — ONDANSETRON 4 MG: 2 INJECTION INTRAMUSCULAR; INTRAVENOUS at 05:05

## 2021-05-15 RX ADMIN — SODIUM CHLORIDE 1000 ML: 0.9 INJECTION, SOLUTION INTRAVENOUS at 06:05

## 2021-05-15 RX ADMIN — MORPHINE SULFATE 4 MG: 4 INJECTION, SOLUTION INTRAMUSCULAR; INTRAVENOUS at 05:05

## 2021-05-15 RX ADMIN — HEPARIN 500 UNITS: 100 SYRINGE at 07:05

## 2021-05-15 RX ADMIN — SODIUM CHLORIDE 1000 ML: 0.9 INJECTION, SOLUTION INTRAVENOUS at 04:05

## 2021-05-17 ENCOUNTER — INFUSION (OUTPATIENT)
Dept: INFUSION THERAPY | Facility: HOSPITAL | Age: 51
End: 2021-05-17
Attending: INTERNAL MEDICINE
Payer: MEDICAID

## 2021-05-17 ENCOUNTER — TELEPHONE (OUTPATIENT)
Dept: HEMATOLOGY/ONCOLOGY | Facility: CLINIC | Age: 51
End: 2021-05-17

## 2021-05-17 ENCOUNTER — SOCIAL WORK (OUTPATIENT)
Dept: HEMATOLOGY/ONCOLOGY | Facility: CLINIC | Age: 51
End: 2021-05-17

## 2021-05-17 VITALS
TEMPERATURE: 98 F | HEART RATE: 69 BPM | OXYGEN SATURATION: 98 % | RESPIRATION RATE: 16 BRPM | DIASTOLIC BLOOD PRESSURE: 74 MMHG | SYSTOLIC BLOOD PRESSURE: 115 MMHG

## 2021-05-17 DIAGNOSIS — C79.51 SECONDARY CANCER OF BONE: ICD-10-CM

## 2021-05-17 DIAGNOSIS — T45.1X5A CHEMOTHERAPY-INDUCED NAUSEA: Primary | ICD-10-CM

## 2021-05-17 DIAGNOSIS — R11.0 CHEMOTHERAPY-INDUCED NAUSEA: Primary | ICD-10-CM

## 2021-05-17 PROCEDURE — 25000003 PHARM REV CODE 250: Performed by: NURSE PRACTITIONER

## 2021-05-17 PROCEDURE — 96360 HYDRATION IV INFUSION INIT: CPT

## 2021-05-17 RX ORDER — HEPARIN 100 UNIT/ML
500 SYRINGE INTRAVENOUS
Status: DISCONTINUED | OUTPATIENT
Start: 2021-05-17 | End: 2021-05-17 | Stop reason: HOSPADM

## 2021-05-17 RX ORDER — SODIUM CHLORIDE 0.9 % (FLUSH) 0.9 %
10 SYRINGE (ML) INJECTION
Status: CANCELLED | OUTPATIENT
Start: 2021-05-17

## 2021-05-17 RX ORDER — SODIUM CHLORIDE 9 MG/ML
1000 INJECTION, SOLUTION INTRAVENOUS
Status: COMPLETED | OUTPATIENT
Start: 2021-05-17 | End: 2021-05-17

## 2021-05-17 RX ORDER — HEPARIN 100 UNIT/ML
500 SYRINGE INTRAVENOUS
Status: CANCELLED | OUTPATIENT
Start: 2021-05-17

## 2021-05-17 RX ADMIN — SODIUM CHLORIDE 1000 ML: 0.9 INJECTION, SOLUTION INTRAVENOUS at 01:05

## 2021-05-18 ENCOUNTER — SOCIAL WORK (OUTPATIENT)
Dept: HEMATOLOGY/ONCOLOGY | Facility: CLINIC | Age: 51
End: 2021-05-18

## 2021-05-18 DIAGNOSIS — J44.89 COPD WITH ASTHMA: Primary | ICD-10-CM

## 2021-05-18 PROBLEM — Z72.0 TOBACCO ABUSE DISORDER: Status: ACTIVE | Noted: 2017-08-10

## 2021-05-18 PROBLEM — R79.89 ELEVATED TROPONIN: Status: ACTIVE | Noted: 2021-05-18

## 2021-05-18 PROBLEM — I21.4 NSTEMI (NON-ST ELEVATED MYOCARDIAL INFARCTION): Status: ACTIVE | Noted: 2021-05-18

## 2021-05-18 PROBLEM — E03.9 HYPOTHYROIDISM: Status: ACTIVE | Noted: 2021-05-18

## 2021-05-18 PROBLEM — C50.919: Status: ACTIVE | Noted: 2021-05-18

## 2021-05-18 PROBLEM — E87.6 HYPOKALEMIA: Status: ACTIVE | Noted: 2021-05-18

## 2021-05-19 ENCOUNTER — OFFICE VISIT (OUTPATIENT)
Dept: GYNECOLOGIC ONCOLOGY | Facility: CLINIC | Age: 51
End: 2021-05-19
Payer: MEDICAID

## 2021-05-19 VITALS
WEIGHT: 200.63 LBS | HEART RATE: 103 BPM | BODY MASS INDEX: 31.49 KG/M2 | HEIGHT: 67 IN | DIASTOLIC BLOOD PRESSURE: 71 MMHG | SYSTOLIC BLOOD PRESSURE: 120 MMHG

## 2021-05-19 DIAGNOSIS — Z17.0 MALIGNANT NEOPLASM OF OVERLAPPING SITES OF RIGHT BREAST IN FEMALE, ESTROGEN RECEPTOR POSITIVE: ICD-10-CM

## 2021-05-19 DIAGNOSIS — C50.811 MALIGNANT NEOPLASM OF OVERLAPPING SITES OF RIGHT BREAST IN FEMALE, ESTROGEN RECEPTOR POSITIVE: ICD-10-CM

## 2021-05-19 DIAGNOSIS — C73 PAPILLARY THYROID CARCINOMA: ICD-10-CM

## 2021-05-19 DIAGNOSIS — J44.9 CHRONIC OBSTRUCTIVE PULMONARY DISEASE, UNSPECIFIED COPD TYPE: ICD-10-CM

## 2021-05-19 DIAGNOSIS — Z72.0 TOBACCO ABUSE: ICD-10-CM

## 2021-05-19 DIAGNOSIS — C53.0 MALIGNANT NEOPLASM OF ENDOCERVIX: Primary | ICD-10-CM

## 2021-05-19 DIAGNOSIS — E66.9 OBESITY (BMI 30-39.9): ICD-10-CM

## 2021-05-19 DIAGNOSIS — F41.9 ANXIETY: ICD-10-CM

## 2021-05-19 DIAGNOSIS — G89.3 CANCER ASSOCIATED PAIN: ICD-10-CM

## 2021-05-19 PROCEDURE — 99215 OFFICE O/P EST HI 40 MIN: CPT | Mod: S$PBB,,, | Performed by: OBSTETRICS & GYNECOLOGY

## 2021-05-19 PROCEDURE — 99213 OFFICE O/P EST LOW 20 MIN: CPT | Mod: PBBFAC | Performed by: OBSTETRICS & GYNECOLOGY

## 2021-05-19 PROCEDURE — 99999 PR PBB SHADOW E&M-EST. PATIENT-LVL III: ICD-10-PCS | Mod: PBBFAC,,, | Performed by: OBSTETRICS & GYNECOLOGY

## 2021-05-19 PROCEDURE — 99999 PR PBB SHADOW E&M-EST. PATIENT-LVL III: CPT | Mod: PBBFAC,,, | Performed by: OBSTETRICS & GYNECOLOGY

## 2021-05-19 PROCEDURE — 99215 PR OFFICE/OUTPT VISIT, EST, LEVL V, 40-54 MIN: ICD-10-PCS | Mod: S$PBB,,, | Performed by: OBSTETRICS & GYNECOLOGY

## 2021-05-20 ENCOUNTER — TELEPHONE (OUTPATIENT)
Dept: HEMATOLOGY/ONCOLOGY | Facility: CLINIC | Age: 51
End: 2021-05-20

## 2021-05-20 RX ORDER — MORPHINE SULFATE 15 MG/1
15 TABLET, FILM COATED, EXTENDED RELEASE ORAL EVERY 12 HOURS
Qty: 60 TABLET | Refills: 0 | Status: SHIPPED | OUTPATIENT
Start: 2021-05-20 | End: 2021-06-17 | Stop reason: SDUPTHER

## 2021-05-20 RX ORDER — ALPRAZOLAM 1 MG/1
.5-1 TABLET ORAL 2 TIMES DAILY PRN
Qty: 60 TABLET | Refills: 0 | Status: SHIPPED | OUTPATIENT
Start: 2021-05-20 | End: 2021-06-17 | Stop reason: SDUPTHER

## 2021-05-21 ENCOUNTER — INFUSION (OUTPATIENT)
Dept: INFUSION THERAPY | Facility: HOSPITAL | Age: 51
End: 2021-05-21
Attending: INTERNAL MEDICINE
Payer: MEDICAID

## 2021-05-21 VITALS
OXYGEN SATURATION: 99 % | RESPIRATION RATE: 16 BRPM | SYSTOLIC BLOOD PRESSURE: 118 MMHG | TEMPERATURE: 98 F | HEART RATE: 78 BPM | DIASTOLIC BLOOD PRESSURE: 76 MMHG

## 2021-05-21 DIAGNOSIS — T45.1X5A CHEMOTHERAPY-INDUCED NAUSEA: Primary | ICD-10-CM

## 2021-05-21 DIAGNOSIS — R11.0 CHEMOTHERAPY-INDUCED NAUSEA: Primary | ICD-10-CM

## 2021-05-21 DIAGNOSIS — C79.51 SECONDARY CANCER OF BONE: ICD-10-CM

## 2021-05-21 PROCEDURE — 63600175 PHARM REV CODE 636 W HCPCS: Performed by: NURSE PRACTITIONER

## 2021-05-21 PROCEDURE — 25000003 PHARM REV CODE 250: Performed by: NURSE PRACTITIONER

## 2021-05-21 PROCEDURE — 96360 HYDRATION IV INFUSION INIT: CPT

## 2021-05-21 RX ORDER — SODIUM CHLORIDE 0.9 % (FLUSH) 0.9 %
10 SYRINGE (ML) INJECTION
Status: CANCELLED | OUTPATIENT
Start: 2021-05-21

## 2021-05-21 RX ORDER — SODIUM CHLORIDE 9 MG/ML
1000 INJECTION, SOLUTION INTRAVENOUS
Status: COMPLETED | OUTPATIENT
Start: 2021-05-21 | End: 2021-05-21

## 2021-05-21 RX ORDER — HEPARIN 100 UNIT/ML
500 SYRINGE INTRAVENOUS
Status: CANCELLED | OUTPATIENT
Start: 2021-05-21

## 2021-05-21 RX ORDER — HEPARIN 100 UNIT/ML
500 SYRINGE INTRAVENOUS
Status: DISCONTINUED | OUTPATIENT
Start: 2021-05-21 | End: 2021-05-21 | Stop reason: HOSPADM

## 2021-05-21 RX ADMIN — HEPARIN 500 UNITS: 100 SYRINGE at 02:05

## 2021-05-21 RX ADMIN — SODIUM CHLORIDE 1000 ML: 0.9 INJECTION, SOLUTION INTRAVENOUS at 01:05

## 2021-05-22 DIAGNOSIS — G89.3 CANCER ASSOCIATED PAIN: ICD-10-CM

## 2021-05-24 ENCOUNTER — OFFICE VISIT (OUTPATIENT)
Dept: HEMATOLOGY/ONCOLOGY | Facility: CLINIC | Age: 51
End: 2021-05-24
Payer: MEDICAID

## 2021-05-24 ENCOUNTER — INFUSION (OUTPATIENT)
Dept: INFUSION THERAPY | Facility: HOSPITAL | Age: 51
End: 2021-05-24
Attending: INTERNAL MEDICINE
Payer: MEDICAID

## 2021-05-24 VITALS
SYSTOLIC BLOOD PRESSURE: 128 MMHG | HEART RATE: 77 BPM | TEMPERATURE: 98 F | OXYGEN SATURATION: 99 % | DIASTOLIC BLOOD PRESSURE: 65 MMHG | RESPIRATION RATE: 16 BRPM

## 2021-05-24 VITALS
HEIGHT: 67 IN | HEART RATE: 103 BPM | DIASTOLIC BLOOD PRESSURE: 77 MMHG | SYSTOLIC BLOOD PRESSURE: 131 MMHG | TEMPERATURE: 97 F | WEIGHT: 199.94 LBS | OXYGEN SATURATION: 98 % | BODY MASS INDEX: 31.38 KG/M2

## 2021-05-24 DIAGNOSIS — C53.0 MALIGNANT NEOPLASM OF ENDOCERVIX: ICD-10-CM

## 2021-05-24 DIAGNOSIS — C77.3 MALIGNANT NEOPLASM METASTATIC TO LYMPH NODE OF AXILLA: Primary | ICD-10-CM

## 2021-05-24 DIAGNOSIS — C73 PAPILLARY THYROID CARCINOMA: ICD-10-CM

## 2021-05-24 DIAGNOSIS — R11.0 CHEMOTHERAPY-INDUCED NAUSEA: ICD-10-CM

## 2021-05-24 DIAGNOSIS — C50.919 PRIMARY MALIGNANT NEOPLASM OF BREAST WITH METASTASIS TO OTHER SITE, UNSPECIFIED LATERALITY: ICD-10-CM

## 2021-05-24 DIAGNOSIS — Z72.0 TOBACCO ABUSE DISORDER: ICD-10-CM

## 2021-05-24 DIAGNOSIS — C79.51 SECONDARY CANCER OF BONE: ICD-10-CM

## 2021-05-24 DIAGNOSIS — T45.1X5A CHEMOTHERAPY-INDUCED NAUSEA: ICD-10-CM

## 2021-05-24 DIAGNOSIS — Z17.0 MALIGNANT NEOPLASM OF OVERLAPPING SITES OF RIGHT BREAST IN FEMALE, ESTROGEN RECEPTOR POSITIVE: ICD-10-CM

## 2021-05-24 DIAGNOSIS — C50.811 MALIGNANT NEOPLASM OF OVERLAPPING SITES OF RIGHT BREAST IN FEMALE, ESTROGEN RECEPTOR POSITIVE: ICD-10-CM

## 2021-05-24 LAB
ALBUMIN SERPL BCP-MCNC: 3.5 G/DL (ref 3.5–5.2)
ALP SERPL-CCNC: 74 U/L (ref 55–135)
ALT SERPL W/O P-5'-P-CCNC: 19 U/L (ref 10–44)
ANION GAP SERPL CALC-SCNC: 11 MMOL/L (ref 8–16)
AST SERPL-CCNC: 15 U/L (ref 10–40)
BASOPHILS # BLD AUTO: 0.03 K/UL (ref 0–0.2)
BASOPHILS NFR BLD: 1 % (ref 0–1.9)
BILIRUB SERPL-MCNC: 0.4 MG/DL (ref 0.1–1)
BUN SERPL-MCNC: 12 MG/DL (ref 6–20)
CALCIUM SERPL-MCNC: 8.9 MG/DL (ref 8.7–10.5)
CHLORIDE SERPL-SCNC: 104 MMOL/L (ref 95–110)
CO2 SERPL-SCNC: 23 MMOL/L (ref 23–29)
CREAT SERPL-MCNC: 1 MG/DL (ref 0.5–1.4)
DIFFERENTIAL METHOD: ABNORMAL
EOSINOPHIL # BLD AUTO: 0.1 K/UL (ref 0–0.5)
EOSINOPHIL NFR BLD: 4.1 % (ref 0–8)
ERYTHROCYTE [DISTWIDTH] IN BLOOD BY AUTOMATED COUNT: 16 % (ref 11.5–14.5)
EST. GFR  (AFRICAN AMERICAN): >60 ML/MIN/1.73 M^2
EST. GFR  (NON AFRICAN AMERICAN): >60 ML/MIN/1.73 M^2
GLUCOSE SERPL-MCNC: 94 MG/DL (ref 70–110)
HCT VFR BLD AUTO: 32.5 % (ref 37–48.5)
HGB BLD-MCNC: 10.6 G/DL (ref 12–16)
IMM GRANULOCYTES # BLD AUTO: 0.07 K/UL (ref 0–0.04)
IMM GRANULOCYTES NFR BLD AUTO: 2.4 % (ref 0–0.5)
LYMPHOCYTES # BLD AUTO: 1.2 K/UL (ref 1–4.8)
LYMPHOCYTES NFR BLD: 42.2 % (ref 18–48)
MCH RBC QN AUTO: 33.1 PG (ref 27–31)
MCHC RBC AUTO-ENTMCNC: 32.6 G/DL (ref 32–36)
MCV RBC AUTO: 102 FL (ref 82–98)
MONOCYTES # BLD AUTO: 0.6 K/UL (ref 0.3–1)
MONOCYTES NFR BLD: 21.8 % (ref 4–15)
NEUTROPHILS # BLD AUTO: 0.8 K/UL (ref 1.8–7.7)
NEUTROPHILS NFR BLD: 28.5 % (ref 38–73)
NRBC BLD-RTO: 0 /100 WBC
PLATELET # BLD AUTO: 185 K/UL (ref 150–450)
PLATELET BLD QL SMEAR: ABNORMAL
PMV BLD AUTO: 11.7 FL (ref 9.2–12.9)
POTASSIUM SERPL-SCNC: 3.8 MMOL/L (ref 3.5–5.1)
PROT SERPL-MCNC: 7.1 G/DL (ref 6–8.4)
RBC # BLD AUTO: 3.2 M/UL (ref 4–5.4)
SODIUM SERPL-SCNC: 138 MMOL/L (ref 136–145)
WBC # BLD AUTO: 2.94 K/UL (ref 3.9–12.7)

## 2021-05-24 PROCEDURE — 99215 OFFICE O/P EST HI 40 MIN: CPT | Mod: S$PBB,,, | Performed by: NURSE PRACTITIONER

## 2021-05-24 PROCEDURE — 99215 OFFICE O/P EST HI 40 MIN: CPT | Mod: PBBFAC,25 | Performed by: NURSE PRACTITIONER

## 2021-05-24 PROCEDURE — 80053 COMPREHEN METABOLIC PANEL: CPT | Performed by: NURSE PRACTITIONER

## 2021-05-24 PROCEDURE — 99999 PR PBB SHADOW E&M-EST. PATIENT-LVL V: CPT | Mod: PBBFAC,,, | Performed by: NURSE PRACTITIONER

## 2021-05-24 PROCEDURE — 85025 COMPLETE CBC W/AUTO DIFF WBC: CPT | Performed by: NURSE PRACTITIONER

## 2021-05-24 PROCEDURE — 99215 PR OFFICE/OUTPT VISIT, EST, LEVL V, 40-54 MIN: ICD-10-PCS | Mod: S$PBB,,, | Performed by: NURSE PRACTITIONER

## 2021-05-24 PROCEDURE — 99999 PR PBB SHADOW E&M-EST. PATIENT-LVL V: ICD-10-PCS | Mod: PBBFAC,,, | Performed by: NURSE PRACTITIONER

## 2021-05-24 PROCEDURE — 25000003 PHARM REV CODE 250: Performed by: NURSE PRACTITIONER

## 2021-05-24 PROCEDURE — 96360 HYDRATION IV INFUSION INIT: CPT

## 2021-05-24 PROCEDURE — 63600175 PHARM REV CODE 636 W HCPCS: Performed by: NURSE PRACTITIONER

## 2021-05-24 RX ORDER — HEPARIN 100 UNIT/ML
500 SYRINGE INTRAVENOUS
Status: DISCONTINUED | OUTPATIENT
Start: 2021-05-24 | End: 2021-05-24 | Stop reason: HOSPADM

## 2021-05-24 RX ORDER — HEPARIN 100 UNIT/ML
500 SYRINGE INTRAVENOUS
Status: CANCELLED | OUTPATIENT
Start: 2021-05-24

## 2021-05-24 RX ORDER — SODIUM CHLORIDE 9 MG/ML
1000 INJECTION, SOLUTION INTRAVENOUS
Status: COMPLETED | OUTPATIENT
Start: 2021-05-24 | End: 2021-05-24

## 2021-05-24 RX ORDER — SODIUM CHLORIDE 0.9 % (FLUSH) 0.9 %
10 SYRINGE (ML) INJECTION
Status: CANCELLED | OUTPATIENT
Start: 2021-05-24

## 2021-05-24 RX ADMIN — HEPARIN 500 UNITS: 100 SYRINGE at 02:05

## 2021-05-24 RX ADMIN — SODIUM CHLORIDE 1000 ML: 0.9 INJECTION, SOLUTION INTRAVENOUS at 01:05

## 2021-05-25 RX ORDER — HYDROCODONE BITARTRATE AND ACETAMINOPHEN 10; 325 MG/1; MG/1
1 TABLET ORAL EVERY 4 HOURS PRN
Qty: 120 TABLET | Refills: 0 | Status: SHIPPED | OUTPATIENT
Start: 2021-05-25 | End: 2021-06-13 | Stop reason: SDUPTHER

## 2021-05-26 ENCOUNTER — INFUSION (OUTPATIENT)
Dept: INFUSION THERAPY | Facility: HOSPITAL | Age: 51
End: 2021-05-26
Attending: INTERNAL MEDICINE
Payer: MEDICAID

## 2021-05-26 VITALS
HEART RATE: 76 BPM | RESPIRATION RATE: 16 BRPM | SYSTOLIC BLOOD PRESSURE: 144 MMHG | DIASTOLIC BLOOD PRESSURE: 83 MMHG | TEMPERATURE: 98 F | OXYGEN SATURATION: 98 %

## 2021-05-26 DIAGNOSIS — T45.1X5A CHEMOTHERAPY-INDUCED NAUSEA: Primary | ICD-10-CM

## 2021-05-26 DIAGNOSIS — C79.51 SECONDARY CANCER OF BONE: ICD-10-CM

## 2021-05-26 DIAGNOSIS — R11.0 CHEMOTHERAPY-INDUCED NAUSEA: Primary | ICD-10-CM

## 2021-05-26 PROCEDURE — 63600175 PHARM REV CODE 636 W HCPCS: Performed by: NURSE PRACTITIONER

## 2021-05-26 PROCEDURE — 25000003 PHARM REV CODE 250: Performed by: NURSE PRACTITIONER

## 2021-05-26 PROCEDURE — 96360 HYDRATION IV INFUSION INIT: CPT

## 2021-05-26 RX ORDER — HEPARIN 100 UNIT/ML
500 SYRINGE INTRAVENOUS
Status: CANCELLED | OUTPATIENT
Start: 2021-05-26

## 2021-05-26 RX ORDER — HEPARIN 100 UNIT/ML
500 SYRINGE INTRAVENOUS
Status: DISCONTINUED | OUTPATIENT
Start: 2021-05-26 | End: 2021-05-26 | Stop reason: HOSPADM

## 2021-05-26 RX ORDER — SODIUM CHLORIDE 0.9 % (FLUSH) 0.9 %
10 SYRINGE (ML) INJECTION
Status: CANCELLED | OUTPATIENT
Start: 2021-05-26

## 2021-05-26 RX ORDER — SODIUM CHLORIDE 9 MG/ML
1000 INJECTION, SOLUTION INTRAVENOUS
Status: COMPLETED | OUTPATIENT
Start: 2021-05-26 | End: 2021-05-26

## 2021-05-26 RX ADMIN — SODIUM CHLORIDE 1000 ML: 0.9 INJECTION, SOLUTION INTRAVENOUS at 01:05

## 2021-05-26 RX ADMIN — HEPARIN 500 UNITS: 100 SYRINGE at 02:05

## 2021-05-28 ENCOUNTER — INFUSION (OUTPATIENT)
Dept: INFUSION THERAPY | Facility: HOSPITAL | Age: 51
End: 2021-05-28
Attending: INTERNAL MEDICINE
Payer: MEDICAID

## 2021-05-28 VITALS
DIASTOLIC BLOOD PRESSURE: 84 MMHG | OXYGEN SATURATION: 98 % | HEIGHT: 67 IN | HEART RATE: 78 BPM | RESPIRATION RATE: 16 BRPM | BODY MASS INDEX: 31.45 KG/M2 | TEMPERATURE: 97 F | SYSTOLIC BLOOD PRESSURE: 148 MMHG | WEIGHT: 200.38 LBS

## 2021-05-28 DIAGNOSIS — R11.0 CHEMOTHERAPY-INDUCED NAUSEA: Primary | ICD-10-CM

## 2021-05-28 DIAGNOSIS — T45.1X5A CHEMOTHERAPY-INDUCED NAUSEA: Primary | ICD-10-CM

## 2021-05-28 DIAGNOSIS — C79.51 SECONDARY CANCER OF BONE: ICD-10-CM

## 2021-05-28 PROCEDURE — 63600175 PHARM REV CODE 636 W HCPCS: Performed by: NURSE PRACTITIONER

## 2021-05-28 PROCEDURE — 96361 HYDRATE IV INFUSION ADD-ON: CPT

## 2021-05-28 PROCEDURE — 96360 HYDRATION IV INFUSION INIT: CPT

## 2021-05-28 PROCEDURE — 25000003 PHARM REV CODE 250: Performed by: NURSE PRACTITIONER

## 2021-05-28 RX ORDER — SODIUM CHLORIDE 0.9 % (FLUSH) 0.9 %
10 SYRINGE (ML) INJECTION
Status: DISCONTINUED | OUTPATIENT
Start: 2021-05-28 | End: 2021-05-28 | Stop reason: HOSPADM

## 2021-05-28 RX ORDER — SODIUM CHLORIDE 9 MG/ML
1000 INJECTION, SOLUTION INTRAVENOUS
Status: COMPLETED | OUTPATIENT
Start: 2021-05-28 | End: 2021-05-28

## 2021-05-28 RX ORDER — HEPARIN 100 UNIT/ML
500 SYRINGE INTRAVENOUS
Status: DISCONTINUED | OUTPATIENT
Start: 2021-05-28 | End: 2021-05-28 | Stop reason: HOSPADM

## 2021-05-28 RX ORDER — HEPARIN 100 UNIT/ML
500 SYRINGE INTRAVENOUS
Status: CANCELLED | OUTPATIENT
Start: 2021-05-28

## 2021-05-28 RX ORDER — SODIUM CHLORIDE 0.9 % (FLUSH) 0.9 %
10 SYRINGE (ML) INJECTION
Status: CANCELLED | OUTPATIENT
Start: 2021-05-28

## 2021-05-28 RX ADMIN — HEPARIN 500 UNITS: 100 SYRINGE at 02:05

## 2021-05-28 RX ADMIN — SODIUM CHLORIDE 1000 ML: 0.9 INJECTION, SOLUTION INTRAVENOUS at 01:05

## 2021-05-31 ENCOUNTER — LAB VISIT (OUTPATIENT)
Dept: LAB | Facility: HOSPITAL | Age: 51
End: 2021-05-31
Attending: INTERNAL MEDICINE
Payer: MEDICAID

## 2021-05-31 DIAGNOSIS — C53.0 MALIGNANT NEOPLASM OF ENDOCERVIX: ICD-10-CM

## 2021-05-31 DIAGNOSIS — E86.0 DEHYDRATION: ICD-10-CM

## 2021-05-31 DIAGNOSIS — R19.7 DIARRHEA, UNSPECIFIED TYPE: ICD-10-CM

## 2021-05-31 LAB
ALBUMIN SERPL BCP-MCNC: 3.7 G/DL (ref 3.5–5.2)
ALP SERPL-CCNC: 75 U/L (ref 55–135)
ALT SERPL W/O P-5'-P-CCNC: 15 U/L (ref 10–44)
ANION GAP SERPL CALC-SCNC: 11 MMOL/L (ref 8–16)
AST SERPL-CCNC: 19 U/L (ref 10–40)
BASOPHILS # BLD AUTO: 0.06 K/UL (ref 0–0.2)
BASOPHILS NFR BLD: 0.8 % (ref 0–1.9)
BILIRUB SERPL-MCNC: 0.4 MG/DL (ref 0.1–1)
BUN SERPL-MCNC: 11 MG/DL (ref 6–20)
CALCIUM SERPL-MCNC: 9 MG/DL (ref 8.7–10.5)
CHLORIDE SERPL-SCNC: 103 MMOL/L (ref 95–110)
CO2 SERPL-SCNC: 24 MMOL/L (ref 23–29)
CREAT SERPL-MCNC: 1 MG/DL (ref 0.5–1.4)
DIFFERENTIAL METHOD: ABNORMAL
EOSINOPHIL # BLD AUTO: 0.2 K/UL (ref 0–0.5)
EOSINOPHIL NFR BLD: 2.2 % (ref 0–8)
ERYTHROCYTE [DISTWIDTH] IN BLOOD BY AUTOMATED COUNT: 16.6 % (ref 11.5–14.5)
EST. GFR  (AFRICAN AMERICAN): >60 ML/MIN/1.73 M^2
EST. GFR  (NON AFRICAN AMERICAN): >60 ML/MIN/1.73 M^2
GLUCOSE SERPL-MCNC: 109 MG/DL (ref 70–110)
HCT VFR BLD AUTO: 36.7 % (ref 37–48.5)
HGB BLD-MCNC: 11.4 G/DL (ref 12–16)
IMM GRANULOCYTES # BLD AUTO: 0.15 K/UL (ref 0–0.04)
IMM GRANULOCYTES NFR BLD AUTO: 2 % (ref 0–0.5)
LYMPHOCYTES # BLD AUTO: 2.2 K/UL (ref 1–4.8)
LYMPHOCYTES NFR BLD: 30.2 % (ref 18–48)
MAGNESIUM SERPL-MCNC: 1.7 MG/DL (ref 1.6–2.6)
MCH RBC QN AUTO: 32.1 PG (ref 27–31)
MCHC RBC AUTO-ENTMCNC: 31.1 G/DL (ref 32–36)
MCV RBC AUTO: 103 FL (ref 82–98)
MONOCYTES # BLD AUTO: 0.8 K/UL (ref 0.3–1)
MONOCYTES NFR BLD: 11.4 % (ref 4–15)
NEUTROPHILS # BLD AUTO: 3.9 K/UL (ref 1.8–7.7)
NEUTROPHILS NFR BLD: 53.4 % (ref 38–73)
NRBC BLD-RTO: 0 /100 WBC
PLATELET # BLD AUTO: 323 K/UL (ref 150–450)
PMV BLD AUTO: 10.9 FL (ref 9.2–12.9)
POTASSIUM SERPL-SCNC: 3.9 MMOL/L (ref 3.5–5.1)
PROT SERPL-MCNC: 7.6 G/DL (ref 6–8.4)
RBC # BLD AUTO: 3.55 M/UL (ref 4–5.4)
SODIUM SERPL-SCNC: 138 MMOL/L (ref 136–145)
WBC # BLD AUTO: 7.34 K/UL (ref 3.9–12.7)

## 2021-05-31 PROCEDURE — 80053 COMPREHEN METABOLIC PANEL: CPT | Performed by: INTERNAL MEDICINE

## 2021-05-31 PROCEDURE — 36415 COLL VENOUS BLD VENIPUNCTURE: CPT | Mod: PO | Performed by: INTERNAL MEDICINE

## 2021-05-31 PROCEDURE — 85025 COMPLETE CBC W/AUTO DIFF WBC: CPT | Performed by: INTERNAL MEDICINE

## 2021-05-31 PROCEDURE — 83735 ASSAY OF MAGNESIUM: CPT | Performed by: INTERNAL MEDICINE

## 2021-06-01 ENCOUNTER — OFFICE VISIT (OUTPATIENT)
Dept: HEMATOLOGY/ONCOLOGY | Facility: CLINIC | Age: 51
End: 2021-06-01
Payer: MEDICAID

## 2021-06-01 ENCOUNTER — INFUSION (OUTPATIENT)
Dept: INFUSION THERAPY | Facility: HOSPITAL | Age: 51
End: 2021-06-01
Attending: INTERNAL MEDICINE
Payer: MEDICAID

## 2021-06-01 VITALS
HEIGHT: 67 IN | SYSTOLIC BLOOD PRESSURE: 126 MMHG | HEART RATE: 81 BPM | OXYGEN SATURATION: 98 % | TEMPERATURE: 98 F | DIASTOLIC BLOOD PRESSURE: 78 MMHG | WEIGHT: 198 LBS | RESPIRATION RATE: 16 BRPM | BODY MASS INDEX: 31.08 KG/M2

## 2021-06-01 VITALS
SYSTOLIC BLOOD PRESSURE: 126 MMHG | WEIGHT: 198 LBS | OXYGEN SATURATION: 98 % | HEART RATE: 88 BPM | TEMPERATURE: 98 F | DIASTOLIC BLOOD PRESSURE: 90 MMHG | HEIGHT: 67 IN | BODY MASS INDEX: 31.08 KG/M2

## 2021-06-01 DIAGNOSIS — C53.0 MALIGNANT NEOPLASM OF ENDOCERVIX: Primary | ICD-10-CM

## 2021-06-01 DIAGNOSIS — C50.811 MALIGNANT NEOPLASM OF OVERLAPPING SITES OF RIGHT BREAST IN FEMALE, ESTROGEN RECEPTOR POSITIVE: ICD-10-CM

## 2021-06-01 DIAGNOSIS — Z17.0 MALIGNANT NEOPLASM OF OVERLAPPING SITES OF RIGHT BREAST IN FEMALE, ESTROGEN RECEPTOR POSITIVE: ICD-10-CM

## 2021-06-01 PROCEDURE — 99999 PR PBB SHADOW E&M-EST. PATIENT-LVL IV: ICD-10-PCS | Mod: PBBFAC,,, | Performed by: NURSE PRACTITIONER

## 2021-06-01 PROCEDURE — 96368 THER/DIAG CONCURRENT INF: CPT

## 2021-06-01 PROCEDURE — 99214 PR OFFICE/OUTPT VISIT, EST, LEVL IV, 30-39 MIN: ICD-10-PCS | Mod: 25,S$PBB,, | Performed by: NURSE PRACTITIONER

## 2021-06-01 PROCEDURE — 25000003 PHARM REV CODE 250: Performed by: INTERNAL MEDICINE

## 2021-06-01 PROCEDURE — 96367 TX/PROPH/DG ADDL SEQ IV INF: CPT

## 2021-06-01 PROCEDURE — 99999 PR PBB SHADOW E&M-EST. PATIENT-LVL IV: CPT | Mod: PBBFAC,,, | Performed by: NURSE PRACTITIONER

## 2021-06-01 PROCEDURE — 99214 OFFICE O/P EST MOD 30 MIN: CPT | Mod: PBBFAC | Performed by: NURSE PRACTITIONER

## 2021-06-01 PROCEDURE — 63600175 PHARM REV CODE 636 W HCPCS: Mod: JG | Performed by: INTERNAL MEDICINE

## 2021-06-01 PROCEDURE — 99214 OFFICE O/P EST MOD 30 MIN: CPT | Mod: 25,S$PBB,, | Performed by: NURSE PRACTITIONER

## 2021-06-01 PROCEDURE — 96375 TX/PRO/DX INJ NEW DRUG ADDON: CPT

## 2021-06-01 PROCEDURE — 96415 CHEMO IV INFUSION ADDL HR: CPT

## 2021-06-01 PROCEDURE — 96413 CHEMO IV INFUSION 1 HR: CPT

## 2021-06-01 PROCEDURE — 96417 CHEMO IV INFUS EACH ADDL SEQ: CPT

## 2021-06-01 RX ORDER — HEPARIN 100 UNIT/ML
500 SYRINGE INTRAVENOUS
Status: CANCELLED | OUTPATIENT
Start: 2021-06-02

## 2021-06-01 RX ORDER — SODIUM CHLORIDE 0.9 % (FLUSH) 0.9 %
10 SYRINGE (ML) INJECTION
Status: DISCONTINUED | OUTPATIENT
Start: 2021-06-01 | End: 2021-06-01 | Stop reason: HOSPADM

## 2021-06-01 RX ORDER — EPINEPHRINE 0.3 MG/.3ML
0.3 INJECTION SUBCUTANEOUS ONCE AS NEEDED
Status: CANCELLED | OUTPATIENT
Start: 2021-06-01

## 2021-06-01 RX ORDER — HEPARIN 100 UNIT/ML
500 SYRINGE INTRAVENOUS
Status: DISCONTINUED | OUTPATIENT
Start: 2021-06-01 | End: 2021-06-01 | Stop reason: HOSPADM

## 2021-06-01 RX ORDER — DIPHENHYDRAMINE HYDROCHLORIDE 50 MG/ML
50 INJECTION INTRAMUSCULAR; INTRAVENOUS ONCE AS NEEDED
Status: COMPLETED | OUTPATIENT
Start: 2021-06-01 | End: 2021-06-01

## 2021-06-01 RX ORDER — DIPHENHYDRAMINE HYDROCHLORIDE 50 MG/ML
25 INJECTION INTRAMUSCULAR; INTRAVENOUS
Status: DISCONTINUED | OUTPATIENT
Start: 2021-06-01 | End: 2021-06-01

## 2021-06-01 RX ORDER — HEPARIN 100 UNIT/ML
500 SYRINGE INTRAVENOUS
Status: CANCELLED | OUTPATIENT
Start: 2021-06-08

## 2021-06-01 RX ORDER — HEPARIN 100 UNIT/ML
500 SYRINGE INTRAVENOUS
Status: CANCELLED | OUTPATIENT
Start: 2021-06-01

## 2021-06-01 RX ORDER — SODIUM CHLORIDE 0.9 % (FLUSH) 0.9 %
10 SYRINGE (ML) INJECTION
Status: CANCELLED | OUTPATIENT
Start: 2021-06-08

## 2021-06-01 RX ORDER — SODIUM CHLORIDE 0.9 % (FLUSH) 0.9 %
10 SYRINGE (ML) INJECTION
Status: CANCELLED | OUTPATIENT
Start: 2021-06-02

## 2021-06-01 RX ORDER — DIPHENHYDRAMINE HYDROCHLORIDE 50 MG/ML
50 INJECTION INTRAMUSCULAR; INTRAVENOUS ONCE AS NEEDED
Status: CANCELLED | OUTPATIENT
Start: 2021-06-01

## 2021-06-01 RX ORDER — SODIUM CHLORIDE 0.9 % (FLUSH) 0.9 %
10 SYRINGE (ML) INJECTION
Status: CANCELLED | OUTPATIENT
Start: 2021-06-01

## 2021-06-01 RX ORDER — FAMOTIDINE 10 MG/ML
20 INJECTION INTRAVENOUS
Status: COMPLETED | OUTPATIENT
Start: 2021-06-01 | End: 2021-06-01

## 2021-06-01 RX ORDER — FAMOTIDINE 10 MG/ML
20 INJECTION INTRAVENOUS
Status: CANCELLED | OUTPATIENT
Start: 2021-06-01

## 2021-06-01 RX ADMIN — BEVACIZUMAB 1480 MG: 400 INJECTION, SOLUTION INTRAVENOUS at 02:06

## 2021-06-01 RX ADMIN — HEPARIN 500 UNITS: 100 SYRINGE at 03:06

## 2021-06-01 RX ADMIN — FAMOTIDINE 20 MG: 10 INJECTION INTRAVENOUS at 09:06

## 2021-06-01 RX ADMIN — MAGNESIUM SULFATE HEPTAHYDRATE: 500 INJECTION, SOLUTION INTRAMUSCULAR; INTRAVENOUS at 10:06

## 2021-06-01 RX ADMIN — APREPITANT 130 MG: 130 INJECTION, EMULSION INTRAVENOUS at 09:06

## 2021-06-01 RX ADMIN — CISPLATIN 108 MG: 100 INJECTION, SOLUTION INTRAVENOUS at 01:06

## 2021-06-01 RX ADMIN — DIPHENHYDRAMINE HYDROCHLORIDE 50 MG: 50 INJECTION INTRAMUSCULAR; INTRAVENOUS at 03:06

## 2021-06-01 RX ADMIN — PACLITAXEL 378 MG: 6 INJECTION, SOLUTION INTRAVENOUS at 10:06

## 2021-06-01 RX ADMIN — DIPHENHYDRAMINE HYDROCHLORIDE 50 MG: 50 INJECTION, SOLUTION INTRAMUSCULAR; INTRAVENOUS at 09:06

## 2021-06-01 RX ADMIN — PALONOSETRON HYDROCHLORIDE 0.25 MG: 0.25 INJECTION, SOLUTION INTRAVENOUS at 09:06

## 2021-06-02 ENCOUNTER — INFUSION (OUTPATIENT)
Dept: INFUSION THERAPY | Facility: HOSPITAL | Age: 51
End: 2021-06-02
Attending: INTERNAL MEDICINE
Payer: MEDICAID

## 2021-06-02 ENCOUNTER — PATIENT MESSAGE (OUTPATIENT)
Dept: PHARMACY | Facility: CLINIC | Age: 51
End: 2021-06-02

## 2021-06-02 VITALS
DIASTOLIC BLOOD PRESSURE: 74 MMHG | TEMPERATURE: 97 F | HEART RATE: 86 BPM | SYSTOLIC BLOOD PRESSURE: 117 MMHG | RESPIRATION RATE: 16 BRPM | OXYGEN SATURATION: 97 %

## 2021-06-02 DIAGNOSIS — C53.0 MALIGNANT NEOPLASM OF ENDOCERVIX: Primary | ICD-10-CM

## 2021-06-02 PROCEDURE — 25000003 PHARM REV CODE 250: Performed by: INTERNAL MEDICINE

## 2021-06-02 PROCEDURE — 63600175 PHARM REV CODE 636 W HCPCS: Performed by: INTERNAL MEDICINE

## 2021-06-02 PROCEDURE — 96360 HYDRATION IV INFUSION INIT: CPT

## 2021-06-02 PROCEDURE — 96361 HYDRATE IV INFUSION ADD-ON: CPT

## 2021-06-02 RX ORDER — HEPARIN 100 UNIT/ML
500 SYRINGE INTRAVENOUS
Status: DISCONTINUED | OUTPATIENT
Start: 2021-06-02 | End: 2021-06-02 | Stop reason: HOSPADM

## 2021-06-02 RX ORDER — HEPARIN 100 UNIT/ML
500 SYRINGE INTRAVENOUS
Status: CANCELLED | OUTPATIENT
Start: 2021-06-02

## 2021-06-02 RX ORDER — SODIUM CHLORIDE 9 MG/ML
1000 INJECTION, SOLUTION INTRAVENOUS
Status: COMPLETED | OUTPATIENT
Start: 2021-06-02 | End: 2021-06-02

## 2021-06-02 RX ORDER — SODIUM CHLORIDE 0.9 % (FLUSH) 0.9 %
10 SYRINGE (ML) INJECTION
Status: DISCONTINUED | OUTPATIENT
Start: 2021-06-02 | End: 2021-06-02 | Stop reason: HOSPADM

## 2021-06-02 RX ORDER — SODIUM CHLORIDE 0.9 % (FLUSH) 0.9 %
10 SYRINGE (ML) INJECTION
Status: CANCELLED | OUTPATIENT
Start: 2021-06-02

## 2021-06-02 RX ADMIN — HEPARIN 500 UNITS: 100 SYRINGE at 04:06

## 2021-06-02 RX ADMIN — SODIUM CHLORIDE 1000 ML: 0.9 INJECTION, SOLUTION INTRAVENOUS at 03:06

## 2021-06-03 ENCOUNTER — SPECIALTY PHARMACY (OUTPATIENT)
Dept: PHARMACY | Facility: CLINIC | Age: 51
End: 2021-06-03

## 2021-06-04 ENCOUNTER — INFUSION (OUTPATIENT)
Dept: INFUSION THERAPY | Facility: HOSPITAL | Age: 51
End: 2021-06-04
Attending: INTERNAL MEDICINE
Payer: MEDICAID

## 2021-06-04 ENCOUNTER — TELEPHONE (OUTPATIENT)
Dept: HEMATOLOGY/ONCOLOGY | Facility: CLINIC | Age: 51
End: 2021-06-04

## 2021-06-04 VITALS
HEART RATE: 79 BPM | TEMPERATURE: 98 F | OXYGEN SATURATION: 97 % | RESPIRATION RATE: 16 BRPM | SYSTOLIC BLOOD PRESSURE: 119 MMHG | DIASTOLIC BLOOD PRESSURE: 74 MMHG

## 2021-06-04 DIAGNOSIS — R11.0 CHEMOTHERAPY-INDUCED NAUSEA: Primary | ICD-10-CM

## 2021-06-04 DIAGNOSIS — T45.1X5A CHEMOTHERAPY-INDUCED NAUSEA: Primary | ICD-10-CM

## 2021-06-04 DIAGNOSIS — C79.51 SECONDARY CANCER OF BONE: ICD-10-CM

## 2021-06-04 DIAGNOSIS — R10.9 ABDOMINAL PAIN, UNSPECIFIED ABDOMINAL LOCATION: Primary | ICD-10-CM

## 2021-06-04 PROCEDURE — 96374 THER/PROPH/DIAG INJ IV PUSH: CPT

## 2021-06-04 PROCEDURE — 96361 HYDRATE IV INFUSION ADD-ON: CPT

## 2021-06-04 PROCEDURE — 63600175 PHARM REV CODE 636 W HCPCS: Performed by: NURSE PRACTITIONER

## 2021-06-04 PROCEDURE — 25000003 PHARM REV CODE 250: Performed by: NURSE PRACTITIONER

## 2021-06-04 PROCEDURE — 96375 TX/PRO/DX INJ NEW DRUG ADDON: CPT

## 2021-06-04 PROCEDURE — 96360 HYDRATION IV INFUSION INIT: CPT

## 2021-06-04 PROCEDURE — 25000003 PHARM REV CODE 250: Performed by: INTERNAL MEDICINE

## 2021-06-04 RX ORDER — HEPARIN 100 UNIT/ML
500 SYRINGE INTRAVENOUS
Status: CANCELLED | OUTPATIENT
Start: 2021-06-04

## 2021-06-04 RX ORDER — FAMOTIDINE 10 MG/ML
20 INJECTION INTRAVENOUS
Status: COMPLETED | OUTPATIENT
Start: 2021-06-04 | End: 2021-06-04

## 2021-06-04 RX ORDER — SODIUM CHLORIDE 0.9 % (FLUSH) 0.9 %
10 SYRINGE (ML) INJECTION
Status: DISCONTINUED | OUTPATIENT
Start: 2021-06-04 | End: 2021-06-04 | Stop reason: HOSPADM

## 2021-06-04 RX ORDER — HEPARIN 100 UNIT/ML
500 SYRINGE INTRAVENOUS
Status: DISCONTINUED | OUTPATIENT
Start: 2021-06-04 | End: 2021-06-04 | Stop reason: HOSPADM

## 2021-06-04 RX ORDER — SODIUM CHLORIDE 9 MG/ML
1000 INJECTION, SOLUTION INTRAVENOUS
Status: COMPLETED | OUTPATIENT
Start: 2021-06-04 | End: 2021-06-04

## 2021-06-04 RX ORDER — SODIUM CHLORIDE 0.9 % (FLUSH) 0.9 %
10 SYRINGE (ML) INJECTION
Status: CANCELLED | OUTPATIENT
Start: 2021-06-04

## 2021-06-04 RX ADMIN — HEPARIN 500 UNITS: 100 SYRINGE at 02:06

## 2021-06-04 RX ADMIN — FAMOTIDINE 20 MG: 10 INJECTION INTRAVENOUS at 01:06

## 2021-06-04 RX ADMIN — SODIUM CHLORIDE 1000 ML: 0.9 INJECTION, SOLUTION INTRAVENOUS at 01:06

## 2021-06-05 ENCOUNTER — HOSPITAL ENCOUNTER (EMERGENCY)
Facility: HOSPITAL | Age: 51
Discharge: HOME | End: 2021-06-05
Attending: STUDENT IN AN ORGANIZED HEALTH CARE EDUCATION/TRAINING PROGRAM
Payer: MEDICAID

## 2021-06-05 VITALS
BODY MASS INDEX: 31.01 KG/M2 | OXYGEN SATURATION: 97 % | DIASTOLIC BLOOD PRESSURE: 56 MMHG | TEMPERATURE: 99 F | HEIGHT: 67 IN | SYSTOLIC BLOOD PRESSURE: 124 MMHG | RESPIRATION RATE: 16 BRPM | HEART RATE: 77 BPM

## 2021-06-05 DIAGNOSIS — R53.83 FATIGUE, UNSPECIFIED TYPE: ICD-10-CM

## 2021-06-05 DIAGNOSIS — R19.7 DIARRHEA, UNSPECIFIED TYPE: Primary | ICD-10-CM

## 2021-06-05 DIAGNOSIS — R10.30 LOWER ABDOMINAL PAIN: ICD-10-CM

## 2021-06-05 LAB
ALBUMIN SERPL BCP-MCNC: 3.3 G/DL (ref 3.5–5.2)
ALP SERPL-CCNC: 63 U/L (ref 55–135)
ALT SERPL W/O P-5'-P-CCNC: 16 U/L (ref 10–44)
ANION GAP SERPL CALC-SCNC: 9 MMOL/L (ref 8–16)
AST SERPL-CCNC: 12 U/L (ref 10–40)
B-HCG UR QL: NEGATIVE
BACTERIA #/AREA URNS HPF: NORMAL /HPF
BASOPHILS # BLD AUTO: 0.01 K/UL (ref 0–0.2)
BASOPHILS NFR BLD: 0.3 % (ref 0–1.9)
BILIRUB SERPL-MCNC: 0.7 MG/DL (ref 0.1–1)
BILIRUB UR QL STRIP: NEGATIVE
BUN SERPL-MCNC: 17 MG/DL (ref 6–20)
CALCIUM SERPL-MCNC: 8.2 MG/DL (ref 8.7–10.5)
CHLORIDE SERPL-SCNC: 100 MMOL/L (ref 95–110)
CLARITY UR: CLEAR
CO2 SERPL-SCNC: 26 MMOL/L (ref 23–29)
COLOR UR: YELLOW
CREAT SERPL-MCNC: 0.9 MG/DL (ref 0.5–1.4)
DIFFERENTIAL METHOD: ABNORMAL
EOSINOPHIL # BLD AUTO: 0.1 K/UL (ref 0–0.5)
EOSINOPHIL NFR BLD: 3.3 % (ref 0–8)
ERYTHROCYTE [DISTWIDTH] IN BLOOD BY AUTOMATED COUNT: 15.5 % (ref 11.5–14.5)
EST. GFR  (AFRICAN AMERICAN): >60 ML/MIN/1.73 M^2
EST. GFR  (NON AFRICAN AMERICAN): >60 ML/MIN/1.73 M^2
GLUCOSE SERPL-MCNC: 120 MG/DL (ref 70–110)
GLUCOSE UR QL STRIP: NEGATIVE
HCT VFR BLD AUTO: 30 % (ref 37–48.5)
HGB BLD-MCNC: 9.8 G/DL (ref 12–16)
HGB UR QL STRIP: ABNORMAL
HYALINE CASTS #/AREA URNS LPF: 0 /LPF
IMM GRANULOCYTES # BLD AUTO: 0.02 K/UL (ref 0–0.04)
IMM GRANULOCYTES NFR BLD AUTO: 0.5 % (ref 0–0.5)
KETONES UR QL STRIP: NEGATIVE
LEUKOCYTE ESTERASE UR QL STRIP: NEGATIVE
LYMPHOCYTES # BLD AUTO: 0.7 K/UL (ref 1–4.8)
LYMPHOCYTES NFR BLD: 18.2 % (ref 18–48)
MCH RBC QN AUTO: 32.3 PG (ref 27–31)
MCHC RBC AUTO-ENTMCNC: 32.7 G/DL (ref 32–36)
MCV RBC AUTO: 99 FL (ref 82–98)
MICROSCOPIC COMMENT: NORMAL
MONOCYTES # BLD AUTO: 0.1 K/UL (ref 0.3–1)
MONOCYTES NFR BLD: 3 % (ref 4–15)
NEUTROPHILS # BLD AUTO: 2.8 K/UL (ref 1.8–7.7)
NEUTROPHILS NFR BLD: 74.7 % (ref 38–73)
NITRITE UR QL STRIP: NEGATIVE
NRBC BLD-RTO: 0 /100 WBC
PH UR STRIP: 6 [PH] (ref 5–8)
PLATELET # BLD AUTO: 158 K/UL (ref 150–450)
PLATELET BLD QL SMEAR: ABNORMAL
PMV BLD AUTO: 11.2 FL (ref 9.2–12.9)
POTASSIUM SERPL-SCNC: 3.8 MMOL/L (ref 3.5–5.1)
PROT SERPL-MCNC: 6.3 G/DL (ref 6–8.4)
PROT UR QL STRIP: ABNORMAL
RBC # BLD AUTO: 3.03 M/UL (ref 4–5.4)
RBC #/AREA URNS HPF: 3 /HPF (ref 0–4)
SODIUM SERPL-SCNC: 135 MMOL/L (ref 136–145)
SP GR UR STRIP: >=1.03 (ref 1–1.03)
TROPONIN I SERPL DL<=0.01 NG/ML-MCNC: 0.01 NG/ML (ref 0–0.03)
TSH SERPL DL<=0.005 MIU/L-ACNC: 3.59 UIU/ML (ref 0.4–4)
URN SPEC COLLECT METH UR: ABNORMAL
UROBILINOGEN UR STRIP-ACNC: 1 EU/DL
WBC # BLD AUTO: 3.68 K/UL (ref 3.9–12.7)
WBC #/AREA URNS HPF: 2 /HPF (ref 0–5)

## 2021-06-05 PROCEDURE — 85025 COMPLETE CBC W/AUTO DIFF WBC: CPT | Performed by: STUDENT IN AN ORGANIZED HEALTH CARE EDUCATION/TRAINING PROGRAM

## 2021-06-05 PROCEDURE — 80053 COMPREHEN METABOLIC PANEL: CPT | Performed by: STUDENT IN AN ORGANIZED HEALTH CARE EDUCATION/TRAINING PROGRAM

## 2021-06-05 PROCEDURE — 81000 URINALYSIS NONAUTO W/SCOPE: CPT | Performed by: STUDENT IN AN ORGANIZED HEALTH CARE EDUCATION/TRAINING PROGRAM

## 2021-06-05 PROCEDURE — 63600175 PHARM REV CODE 636 W HCPCS: Performed by: STUDENT IN AN ORGANIZED HEALTH CARE EDUCATION/TRAINING PROGRAM

## 2021-06-05 PROCEDURE — 25000003 PHARM REV CODE 250: Performed by: STUDENT IN AN ORGANIZED HEALTH CARE EDUCATION/TRAINING PROGRAM

## 2021-06-05 PROCEDURE — 84484 ASSAY OF TROPONIN QUANT: CPT | Performed by: STUDENT IN AN ORGANIZED HEALTH CARE EDUCATION/TRAINING PROGRAM

## 2021-06-05 PROCEDURE — 84443 ASSAY THYROID STIM HORMONE: CPT | Performed by: STUDENT IN AN ORGANIZED HEALTH CARE EDUCATION/TRAINING PROGRAM

## 2021-06-05 PROCEDURE — 96365 THER/PROPH/DIAG IV INF INIT: CPT | Mod: 59

## 2021-06-05 PROCEDURE — 96361 HYDRATE IV INFUSION ADD-ON: CPT

## 2021-06-05 PROCEDURE — 81025 URINE PREGNANCY TEST: CPT | Performed by: STUDENT IN AN ORGANIZED HEALTH CARE EDUCATION/TRAINING PROGRAM

## 2021-06-05 PROCEDURE — 99285 EMERGENCY DEPT VISIT HI MDM: CPT | Mod: 25

## 2021-06-05 PROCEDURE — 96375 TX/PRO/DX INJ NEW DRUG ADDON: CPT

## 2021-06-05 PROCEDURE — 25500020 PHARM REV CODE 255: Performed by: STUDENT IN AN ORGANIZED HEALTH CARE EDUCATION/TRAINING PROGRAM

## 2021-06-05 RX ORDER — AZITHROMYCIN 500 MG/1
500 TABLET, FILM COATED ORAL DAILY
Qty: 5 TABLET | Refills: 0 | Status: SHIPPED | OUTPATIENT
Start: 2021-06-05 | End: 2021-06-10

## 2021-06-05 RX ORDER — HYDROMORPHONE HYDROCHLORIDE 1 MG/ML
1 INJECTION, SOLUTION INTRAMUSCULAR; INTRAVENOUS; SUBCUTANEOUS
Status: COMPLETED | OUTPATIENT
Start: 2021-06-05 | End: 2021-06-05

## 2021-06-05 RX ADMIN — PROMETHAZINE HYDROCHLORIDE 12.5 MG: 25 INJECTION INTRAMUSCULAR; INTRAVENOUS at 04:06

## 2021-06-05 RX ADMIN — HYDROMORPHONE HYDROCHLORIDE 1 MG: 1 INJECTION, SOLUTION INTRAMUSCULAR; INTRAVENOUS; SUBCUTANEOUS at 04:06

## 2021-06-05 RX ADMIN — IOHEXOL 100 ML: 350 INJECTION, SOLUTION INTRAVENOUS at 07:06

## 2021-06-05 RX ADMIN — SODIUM CHLORIDE 1000 ML: 0.9 INJECTION, SOLUTION INTRAVENOUS at 04:06

## 2021-06-07 ENCOUNTER — PATIENT MESSAGE (OUTPATIENT)
Dept: GASTROENTEROLOGY | Facility: CLINIC | Age: 51
End: 2021-06-07

## 2021-06-07 ENCOUNTER — DOCUMENTATION ONLY (OUTPATIENT)
Dept: HEMATOLOGY/ONCOLOGY | Facility: CLINIC | Age: 51
End: 2021-06-07

## 2021-06-07 ENCOUNTER — TELEPHONE (OUTPATIENT)
Dept: HEMATOLOGY/ONCOLOGY | Facility: CLINIC | Age: 51
End: 2021-06-07

## 2021-06-07 ENCOUNTER — TELEPHONE (OUTPATIENT)
Dept: INFUSION THERAPY | Facility: HOSPITAL | Age: 51
End: 2021-06-07

## 2021-06-07 ENCOUNTER — INFUSION (OUTPATIENT)
Dept: INFUSION THERAPY | Facility: HOSPITAL | Age: 51
End: 2021-06-07
Attending: INTERNAL MEDICINE
Payer: MEDICAID

## 2021-06-07 VITALS
HEART RATE: 79 BPM | DIASTOLIC BLOOD PRESSURE: 82 MMHG | RESPIRATION RATE: 18 BRPM | OXYGEN SATURATION: 98 % | SYSTOLIC BLOOD PRESSURE: 134 MMHG | TEMPERATURE: 98 F

## 2021-06-07 DIAGNOSIS — R11.0 CHEMOTHERAPY-INDUCED NAUSEA: Primary | ICD-10-CM

## 2021-06-07 DIAGNOSIS — T45.1X5A CHEMOTHERAPY-INDUCED NAUSEA: Primary | ICD-10-CM

## 2021-06-07 DIAGNOSIS — K52.9 COLITIS: Primary | ICD-10-CM

## 2021-06-07 DIAGNOSIS — C79.51 SECONDARY CANCER OF BONE: ICD-10-CM

## 2021-06-07 PROCEDURE — 25000003 PHARM REV CODE 250: Performed by: NURSE PRACTITIONER

## 2021-06-07 PROCEDURE — 96361 HYDRATE IV INFUSION ADD-ON: CPT

## 2021-06-07 PROCEDURE — 63600175 PHARM REV CODE 636 W HCPCS: Performed by: NURSE PRACTITIONER

## 2021-06-07 PROCEDURE — 96360 HYDRATION IV INFUSION INIT: CPT

## 2021-06-07 PROCEDURE — A4216 STERILE WATER/SALINE, 10 ML: HCPCS | Performed by: NURSE PRACTITIONER

## 2021-06-07 PROCEDURE — 96374 THER/PROPH/DIAG INJ IV PUSH: CPT

## 2021-06-07 RX ORDER — HEPARIN 100 UNIT/ML
500 SYRINGE INTRAVENOUS
Status: DISCONTINUED | OUTPATIENT
Start: 2021-06-07 | End: 2021-06-07 | Stop reason: HOSPADM

## 2021-06-07 RX ORDER — SODIUM CHLORIDE 9 MG/ML
1000 INJECTION, SOLUTION INTRAVENOUS
Status: COMPLETED | OUTPATIENT
Start: 2021-06-07 | End: 2021-06-07

## 2021-06-07 RX ORDER — ONDANSETRON 2 MG/ML
4 INJECTION INTRAMUSCULAR; INTRAVENOUS
Status: COMPLETED | OUTPATIENT
Start: 2021-06-07 | End: 2021-06-07

## 2021-06-07 RX ORDER — CIPROFLOXACIN 500 MG/1
500 TABLET ORAL EVERY 12 HOURS
Qty: 20 TABLET | Refills: 0 | Status: SHIPPED | OUTPATIENT
Start: 2021-06-07 | End: 2021-06-17

## 2021-06-07 RX ORDER — SODIUM CHLORIDE 0.9 % (FLUSH) 0.9 %
10 SYRINGE (ML) INJECTION
Status: DISCONTINUED | OUTPATIENT
Start: 2021-06-07 | End: 2021-06-07 | Stop reason: HOSPADM

## 2021-06-07 RX ORDER — METRONIDAZOLE 250 MG/1
250 TABLET ORAL 3 TIMES DAILY
Qty: 30 TABLET | Refills: 0 | Status: SHIPPED | OUTPATIENT
Start: 2021-06-07 | End: 2021-06-17

## 2021-06-07 RX ORDER — SODIUM CHLORIDE 0.9 % (FLUSH) 0.9 %
10 SYRINGE (ML) INJECTION
Status: CANCELLED | OUTPATIENT
Start: 2021-06-07

## 2021-06-07 RX ORDER — HEPARIN 100 UNIT/ML
500 SYRINGE INTRAVENOUS
Status: CANCELLED | OUTPATIENT
Start: 2021-06-07

## 2021-06-07 RX ADMIN — SODIUM CHLORIDE 1000 ML: 0.9 INJECTION, SOLUTION INTRAVENOUS at 01:06

## 2021-06-07 RX ADMIN — HEPARIN 500 UNITS: 100 SYRINGE at 02:06

## 2021-06-07 RX ADMIN — ONDANSETRON HYDROCHLORIDE 4 MG: 2 SOLUTION INTRAMUSCULAR; INTRAVENOUS at 02:06

## 2021-06-07 RX ADMIN — Medication 10 ML: at 02:06

## 2021-06-08 ENCOUNTER — TELEPHONE (OUTPATIENT)
Dept: INFUSION THERAPY | Facility: HOSPITAL | Age: 51
End: 2021-06-08

## 2021-06-08 ENCOUNTER — PATIENT MESSAGE (OUTPATIENT)
Dept: GASTROENTEROLOGY | Facility: CLINIC | Age: 51
End: 2021-06-08

## 2021-06-09 ENCOUNTER — INFUSION (OUTPATIENT)
Dept: INFUSION THERAPY | Facility: HOSPITAL | Age: 51
End: 2021-06-09
Attending: INTERNAL MEDICINE
Payer: MEDICAID

## 2021-06-09 VITALS
TEMPERATURE: 98 F | HEART RATE: 78 BPM | DIASTOLIC BLOOD PRESSURE: 74 MMHG | RESPIRATION RATE: 16 BRPM | SYSTOLIC BLOOD PRESSURE: 114 MMHG | OXYGEN SATURATION: 98 %

## 2021-06-09 DIAGNOSIS — Z17.0 MALIGNANT NEOPLASM OF OVERLAPPING SITES OF RIGHT BREAST IN FEMALE, ESTROGEN RECEPTOR POSITIVE: ICD-10-CM

## 2021-06-09 DIAGNOSIS — C79.51 SECONDARY CANCER OF BONE: Primary | ICD-10-CM

## 2021-06-09 DIAGNOSIS — C50.811 MALIGNANT NEOPLASM OF OVERLAPPING SITES OF RIGHT BREAST IN FEMALE, ESTROGEN RECEPTOR POSITIVE: ICD-10-CM

## 2021-06-09 DIAGNOSIS — C53.0 MALIGNANT NEOPLASM OF ENDOCERVIX: ICD-10-CM

## 2021-06-09 PROCEDURE — 63600175 PHARM REV CODE 636 W HCPCS: Performed by: INTERNAL MEDICINE

## 2021-06-09 PROCEDURE — A4216 STERILE WATER/SALINE, 10 ML: HCPCS | Performed by: INTERNAL MEDICINE

## 2021-06-09 PROCEDURE — 25000003 PHARM REV CODE 250: Performed by: INTERNAL MEDICINE

## 2021-06-09 PROCEDURE — 96360 HYDRATION IV INFUSION INIT: CPT

## 2021-06-09 RX ORDER — SODIUM CHLORIDE 0.9 % (FLUSH) 0.9 %
10 SYRINGE (ML) INJECTION
Status: DISCONTINUED | OUTPATIENT
Start: 2021-06-09 | End: 2021-06-09 | Stop reason: HOSPADM

## 2021-06-09 RX ORDER — SODIUM CHLORIDE 9 MG/ML
1000 INJECTION, SOLUTION INTRAVENOUS
Status: COMPLETED | OUTPATIENT
Start: 2021-06-09 | End: 2021-06-09

## 2021-06-09 RX ORDER — ZOLEDRONIC ACID 0.04 MG/ML
4 INJECTION, SOLUTION INTRAVENOUS
Status: DISCONTINUED | OUTPATIENT
Start: 2021-06-09 | End: 2021-06-09 | Stop reason: HOSPADM

## 2021-06-09 RX ORDER — HEPARIN 100 UNIT/ML
500 SYRINGE INTRAVENOUS
Status: DISCONTINUED | OUTPATIENT
Start: 2021-06-09 | End: 2021-06-09 | Stop reason: HOSPADM

## 2021-06-09 RX ADMIN — HEPARIN 500 UNITS: 100 SYRINGE at 02:06

## 2021-06-09 RX ADMIN — SODIUM CHLORIDE 10 ML: 9 INJECTION, SOLUTION INTRAMUSCULAR; INTRAVENOUS; SUBCUTANEOUS at 01:06

## 2021-06-09 RX ADMIN — SODIUM CHLORIDE 1000 ML: 0.9 INJECTION, SOLUTION INTRAVENOUS at 01:06

## 2021-06-11 ENCOUNTER — INFUSION (OUTPATIENT)
Dept: INFUSION THERAPY | Facility: HOSPITAL | Age: 51
End: 2021-06-11
Attending: INTERNAL MEDICINE
Payer: MEDICAID

## 2021-06-11 VITALS
TEMPERATURE: 99 F | SYSTOLIC BLOOD PRESSURE: 133 MMHG | OXYGEN SATURATION: 98 % | RESPIRATION RATE: 16 BRPM | DIASTOLIC BLOOD PRESSURE: 82 MMHG | HEART RATE: 86 BPM

## 2021-06-11 DIAGNOSIS — T45.1X5A CHEMOTHERAPY-INDUCED NAUSEA: Primary | ICD-10-CM

## 2021-06-11 DIAGNOSIS — R11.0 CHEMOTHERAPY-INDUCED NAUSEA: Primary | ICD-10-CM

## 2021-06-11 DIAGNOSIS — C79.51 SECONDARY CANCER OF BONE: ICD-10-CM

## 2021-06-11 PROCEDURE — 25000003 PHARM REV CODE 250: Performed by: NURSE PRACTITIONER

## 2021-06-11 PROCEDURE — 63600175 PHARM REV CODE 636 W HCPCS: Performed by: NURSE PRACTITIONER

## 2021-06-11 PROCEDURE — 96360 HYDRATION IV INFUSION INIT: CPT

## 2021-06-11 RX ORDER — SODIUM CHLORIDE 9 MG/ML
1000 INJECTION, SOLUTION INTRAVENOUS
Status: COMPLETED | OUTPATIENT
Start: 2021-06-11 | End: 2021-06-11

## 2021-06-11 RX ORDER — HEPARIN 100 UNIT/ML
500 SYRINGE INTRAVENOUS
Status: CANCELLED | OUTPATIENT
Start: 2021-06-11

## 2021-06-11 RX ORDER — SODIUM CHLORIDE 0.9 % (FLUSH) 0.9 %
10 SYRINGE (ML) INJECTION
Status: CANCELLED | OUTPATIENT
Start: 2021-06-11

## 2021-06-11 RX ORDER — HEPARIN 100 UNIT/ML
500 SYRINGE INTRAVENOUS
Status: DISCONTINUED | OUTPATIENT
Start: 2021-06-11 | End: 2021-06-11 | Stop reason: HOSPADM

## 2021-06-11 RX ADMIN — HEPARIN 500 UNITS: 100 SYRINGE at 02:06

## 2021-06-11 RX ADMIN — SODIUM CHLORIDE 1000 ML: 0.9 INJECTION, SOLUTION INTRAVENOUS at 01:06

## 2021-06-13 DIAGNOSIS — G89.3 CANCER ASSOCIATED PAIN: ICD-10-CM

## 2021-06-14 ENCOUNTER — INFUSION (OUTPATIENT)
Dept: INFUSION THERAPY | Facility: HOSPITAL | Age: 51
End: 2021-06-14
Attending: INTERNAL MEDICINE
Payer: MEDICAID

## 2021-06-14 VITALS
DIASTOLIC BLOOD PRESSURE: 81 MMHG | SYSTOLIC BLOOD PRESSURE: 135 MMHG | RESPIRATION RATE: 16 BRPM | OXYGEN SATURATION: 99 % | TEMPERATURE: 98 F | HEART RATE: 70 BPM

## 2021-06-14 DIAGNOSIS — C79.51 SECONDARY CANCER OF BONE: ICD-10-CM

## 2021-06-14 DIAGNOSIS — T45.1X5A CHEMOTHERAPY-INDUCED NAUSEA: Primary | ICD-10-CM

## 2021-06-14 DIAGNOSIS — R11.0 CHEMOTHERAPY-INDUCED NAUSEA: Primary | ICD-10-CM

## 2021-06-14 PROCEDURE — 63600175 PHARM REV CODE 636 W HCPCS: Performed by: NURSE PRACTITIONER

## 2021-06-14 PROCEDURE — 96360 HYDRATION IV INFUSION INIT: CPT

## 2021-06-14 PROCEDURE — 25000003 PHARM REV CODE 250: Performed by: NURSE PRACTITIONER

## 2021-06-14 RX ORDER — SODIUM CHLORIDE 0.9 % (FLUSH) 0.9 %
10 SYRINGE (ML) INJECTION
Status: CANCELLED | OUTPATIENT
Start: 2021-06-14

## 2021-06-14 RX ORDER — HEPARIN 100 UNIT/ML
500 SYRINGE INTRAVENOUS
Status: CANCELLED | OUTPATIENT
Start: 2021-06-14

## 2021-06-14 RX ORDER — HEPARIN 100 UNIT/ML
500 SYRINGE INTRAVENOUS
Status: DISCONTINUED | OUTPATIENT
Start: 2021-06-14 | End: 2021-06-14 | Stop reason: HOSPADM

## 2021-06-14 RX ADMIN — SODIUM CHLORIDE 1000 ML: 0.9 INJECTION, SOLUTION INTRAVENOUS at 01:06

## 2021-06-14 RX ADMIN — HEPARIN 500 UNITS: 100 SYRINGE at 02:06

## 2021-06-15 ENCOUNTER — OFFICE VISIT (OUTPATIENT)
Dept: GASTROENTEROLOGY | Facility: CLINIC | Age: 51
End: 2021-06-15
Payer: MEDICAID

## 2021-06-15 VITALS
HEIGHT: 67 IN | BODY MASS INDEX: 31.24 KG/M2 | SYSTOLIC BLOOD PRESSURE: 124 MMHG | HEART RATE: 90 BPM | OXYGEN SATURATION: 98 % | WEIGHT: 199.06 LBS | DIASTOLIC BLOOD PRESSURE: 86 MMHG

## 2021-06-15 DIAGNOSIS — R10.9 ABDOMINAL PAIN, UNSPECIFIED ABDOMINAL LOCATION: ICD-10-CM

## 2021-06-15 DIAGNOSIS — R10.30 LOWER ABDOMINAL PAIN: ICD-10-CM

## 2021-06-15 PROCEDURE — 99215 OFFICE O/P EST HI 40 MIN: CPT | Mod: PBBFAC | Performed by: INTERNAL MEDICINE

## 2021-06-15 PROCEDURE — 99204 OFFICE O/P NEW MOD 45 MIN: CPT | Mod: S$PBB,,, | Performed by: INTERNAL MEDICINE

## 2021-06-15 PROCEDURE — 99999 PR PBB SHADOW E&M-EST. PATIENT-LVL V: ICD-10-PCS | Mod: PBBFAC,,, | Performed by: INTERNAL MEDICINE

## 2021-06-15 PROCEDURE — 99204 PR OFFICE/OUTPT VISIT, NEW, LEVL IV, 45-59 MIN: ICD-10-PCS | Mod: S$PBB,,, | Performed by: INTERNAL MEDICINE

## 2021-06-15 PROCEDURE — 99999 PR PBB SHADOW E&M-EST. PATIENT-LVL V: CPT | Mod: PBBFAC,,, | Performed by: INTERNAL MEDICINE

## 2021-06-15 RX ORDER — DICYCLOMINE HYDROCHLORIDE 20 MG/1
20 TABLET ORAL EVERY 6 HOURS
Qty: 120 TABLET | Refills: 0 | Status: SHIPPED | OUTPATIENT
Start: 2021-06-15 | End: 2021-07-15

## 2021-06-15 RX ORDER — HYDROCODONE BITARTRATE AND ACETAMINOPHEN 10; 325 MG/1; MG/1
1 TABLET ORAL EVERY 4 HOURS PRN
Qty: 120 TABLET | Refills: 0 | Status: SHIPPED | OUTPATIENT
Start: 2021-06-15 | End: 2021-07-03 | Stop reason: SDUPTHER

## 2021-06-16 ENCOUNTER — INFUSION (OUTPATIENT)
Dept: INFUSION THERAPY | Facility: HOSPITAL | Age: 51
End: 2021-06-16
Attending: INTERNAL MEDICINE
Payer: MEDICAID

## 2021-06-16 ENCOUNTER — TELEPHONE (OUTPATIENT)
Dept: HEMATOLOGY/ONCOLOGY | Facility: CLINIC | Age: 51
End: 2021-06-16

## 2021-06-16 VITALS
HEART RATE: 67 BPM | DIASTOLIC BLOOD PRESSURE: 89 MMHG | SYSTOLIC BLOOD PRESSURE: 149 MMHG | RESPIRATION RATE: 16 BRPM | OXYGEN SATURATION: 98 % | TEMPERATURE: 98 F

## 2021-06-16 DIAGNOSIS — C79.51 SECONDARY CANCER OF BONE: ICD-10-CM

## 2021-06-16 DIAGNOSIS — T45.1X5A CHEMOTHERAPY-INDUCED NAUSEA: Primary | ICD-10-CM

## 2021-06-16 DIAGNOSIS — R11.0 CHEMOTHERAPY-INDUCED NAUSEA: Primary | ICD-10-CM

## 2021-06-16 PROCEDURE — 63600175 PHARM REV CODE 636 W HCPCS: Performed by: NURSE PRACTITIONER

## 2021-06-16 PROCEDURE — 96360 HYDRATION IV INFUSION INIT: CPT

## 2021-06-16 PROCEDURE — 25000003 PHARM REV CODE 250: Performed by: NURSE PRACTITIONER

## 2021-06-16 RX ORDER — SODIUM CHLORIDE 0.9 % (FLUSH) 0.9 %
10 SYRINGE (ML) INJECTION
Status: CANCELLED | OUTPATIENT
Start: 2021-06-16

## 2021-06-16 RX ORDER — HEPARIN 100 UNIT/ML
500 SYRINGE INTRAVENOUS
Status: DISCONTINUED | OUTPATIENT
Start: 2021-06-16 | End: 2021-06-16 | Stop reason: HOSPADM

## 2021-06-16 RX ORDER — HEPARIN 100 UNIT/ML
500 SYRINGE INTRAVENOUS
Status: CANCELLED | OUTPATIENT
Start: 2021-06-16

## 2021-06-16 RX ORDER — SODIUM CHLORIDE 9 MG/ML
1000 INJECTION, SOLUTION INTRAVENOUS
Status: COMPLETED | OUTPATIENT
Start: 2021-06-16 | End: 2021-06-16

## 2021-06-16 RX ADMIN — HEPARIN 500 UNITS: 100 SYRINGE at 02:06

## 2021-06-16 RX ADMIN — SODIUM CHLORIDE 1000 ML: 0.9 INJECTION, SOLUTION INTRAVENOUS at 01:06

## 2021-06-17 DIAGNOSIS — G89.3 CANCER ASSOCIATED PAIN: ICD-10-CM

## 2021-06-17 DIAGNOSIS — F41.9 ANXIETY: ICD-10-CM

## 2021-06-18 ENCOUNTER — INFUSION (OUTPATIENT)
Dept: INFUSION THERAPY | Facility: HOSPITAL | Age: 51
End: 2021-06-18
Attending: INTERNAL MEDICINE
Payer: MEDICAID

## 2021-06-18 ENCOUNTER — OFFICE VISIT (OUTPATIENT)
Dept: HEMATOLOGY/ONCOLOGY | Facility: CLINIC | Age: 51
End: 2021-06-18
Payer: MEDICAID

## 2021-06-18 VITALS
OXYGEN SATURATION: 98 % | WEIGHT: 198 LBS | BODY MASS INDEX: 31.08 KG/M2 | TEMPERATURE: 98 F | HEIGHT: 67 IN | DIASTOLIC BLOOD PRESSURE: 83 MMHG | SYSTOLIC BLOOD PRESSURE: 121 MMHG | HEART RATE: 97 BPM

## 2021-06-18 VITALS
OXYGEN SATURATION: 98 % | SYSTOLIC BLOOD PRESSURE: 130 MMHG | HEART RATE: 74 BPM | RESPIRATION RATE: 16 BRPM | TEMPERATURE: 99 F | DIASTOLIC BLOOD PRESSURE: 75 MMHG

## 2021-06-18 DIAGNOSIS — C79.51 SECONDARY CANCER OF BONE: ICD-10-CM

## 2021-06-18 DIAGNOSIS — C77.3 MALIGNANT NEOPLASM METASTATIC TO LYMPH NODE OF AXILLA: ICD-10-CM

## 2021-06-18 DIAGNOSIS — C50.919 PRIMARY MALIGNANT NEOPLASM OF BREAST WITH METASTASIS TO OTHER SITE, UNSPECIFIED LATERALITY: ICD-10-CM

## 2021-06-18 DIAGNOSIS — R11.0 CHEMOTHERAPY-INDUCED NAUSEA: Primary | ICD-10-CM

## 2021-06-18 DIAGNOSIS — Z17.0 MALIGNANT NEOPLASM OF OVERLAPPING SITES OF RIGHT BREAST IN FEMALE, ESTROGEN RECEPTOR POSITIVE: Primary | ICD-10-CM

## 2021-06-18 DIAGNOSIS — C50.811 MALIGNANT NEOPLASM OF OVERLAPPING SITES OF RIGHT BREAST IN FEMALE, ESTROGEN RECEPTOR POSITIVE: Primary | ICD-10-CM

## 2021-06-18 DIAGNOSIS — T45.1X5A CHEMOTHERAPY-INDUCED NAUSEA: Primary | ICD-10-CM

## 2021-06-18 PROCEDURE — 63600175 PHARM REV CODE 636 W HCPCS: Performed by: NURSE PRACTITIONER

## 2021-06-18 PROCEDURE — 25000003 PHARM REV CODE 250: Performed by: INTERNAL MEDICINE

## 2021-06-18 PROCEDURE — 99215 OFFICE O/P EST HI 40 MIN: CPT | Mod: S$PBB,,, | Performed by: INTERNAL MEDICINE

## 2021-06-18 PROCEDURE — 99215 OFFICE O/P EST HI 40 MIN: CPT | Mod: PBBFAC,25 | Performed by: INTERNAL MEDICINE

## 2021-06-18 PROCEDURE — 96360 HYDRATION IV INFUSION INIT: CPT

## 2021-06-18 PROCEDURE — 99215 PR OFFICE/OUTPT VISIT, EST, LEVL V, 40-54 MIN: ICD-10-PCS | Mod: S$PBB,,, | Performed by: INTERNAL MEDICINE

## 2021-06-18 PROCEDURE — 99999 PR PBB SHADOW E&M-EST. PATIENT-LVL V: CPT | Mod: PBBFAC,,, | Performed by: INTERNAL MEDICINE

## 2021-06-18 PROCEDURE — 99999 PR PBB SHADOW E&M-EST. PATIENT-LVL V: ICD-10-PCS | Mod: PBBFAC,,, | Performed by: INTERNAL MEDICINE

## 2021-06-18 RX ORDER — SODIUM CHLORIDE 0.9 % (FLUSH) 0.9 %
10 SYRINGE (ML) INJECTION
Status: CANCELLED | OUTPATIENT
Start: 2021-06-18

## 2021-06-18 RX ORDER — HEPARIN 100 UNIT/ML
500 SYRINGE INTRAVENOUS
Status: CANCELLED | OUTPATIENT
Start: 2021-06-18

## 2021-06-18 RX ORDER — HEPARIN 100 UNIT/ML
500 SYRINGE INTRAVENOUS
Status: DISCONTINUED | OUTPATIENT
Start: 2021-06-18 | End: 2021-06-18 | Stop reason: HOSPADM

## 2021-06-18 RX ADMIN — SODIUM CHLORIDE 1000 ML: 0.9 INJECTION, SOLUTION INTRAVENOUS at 01:06

## 2021-06-18 RX ADMIN — HEPARIN 500 UNITS: 100 SYRINGE at 03:06

## 2021-06-21 ENCOUNTER — INFUSION (OUTPATIENT)
Dept: INFUSION THERAPY | Facility: HOSPITAL | Age: 51
End: 2021-06-21
Attending: INTERNAL MEDICINE
Payer: MEDICAID

## 2021-06-21 VITALS
TEMPERATURE: 97 F | HEART RATE: 69 BPM | SYSTOLIC BLOOD PRESSURE: 139 MMHG | OXYGEN SATURATION: 98 % | DIASTOLIC BLOOD PRESSURE: 85 MMHG | RESPIRATION RATE: 18 BRPM

## 2021-06-21 DIAGNOSIS — R11.0 CHEMOTHERAPY-INDUCED NAUSEA: Primary | ICD-10-CM

## 2021-06-21 DIAGNOSIS — T45.1X5A CHEMOTHERAPY-INDUCED NAUSEA: Primary | ICD-10-CM

## 2021-06-21 DIAGNOSIS — C79.51 SECONDARY CANCER OF BONE: ICD-10-CM

## 2021-06-21 PROCEDURE — 96360 HYDRATION IV INFUSION INIT: CPT

## 2021-06-21 PROCEDURE — 63600175 PHARM REV CODE 636 W HCPCS: Performed by: NURSE PRACTITIONER

## 2021-06-21 PROCEDURE — 25000003 PHARM REV CODE 250: Performed by: INTERNAL MEDICINE

## 2021-06-21 RX ORDER — HEPARIN 100 UNIT/ML
500 SYRINGE INTRAVENOUS
Status: DISCONTINUED | OUTPATIENT
Start: 2021-06-21 | End: 2021-06-21 | Stop reason: HOSPADM

## 2021-06-21 RX ORDER — MORPHINE SULFATE 15 MG/1
15 TABLET, FILM COATED, EXTENDED RELEASE ORAL EVERY 12 HOURS
Qty: 60 TABLET | Refills: 0 | Status: SHIPPED | OUTPATIENT
Start: 2021-06-21 | End: 2021-07-18 | Stop reason: SDUPTHER

## 2021-06-21 RX ORDER — SODIUM CHLORIDE 9 MG/ML
1000 INJECTION, SOLUTION INTRAVENOUS
Status: COMPLETED | OUTPATIENT
Start: 2021-06-21 | End: 2021-06-21

## 2021-06-21 RX ORDER — HEPARIN 100 UNIT/ML
500 SYRINGE INTRAVENOUS
Status: CANCELLED | OUTPATIENT
Start: 2021-06-21

## 2021-06-21 RX ORDER — SODIUM CHLORIDE 0.9 % (FLUSH) 0.9 %
10 SYRINGE (ML) INJECTION
Status: CANCELLED | OUTPATIENT
Start: 2021-06-21

## 2021-06-21 RX ORDER — ALPRAZOLAM 1 MG/1
.5-1 TABLET ORAL 2 TIMES DAILY PRN
Qty: 60 TABLET | Refills: 0 | Status: SHIPPED | OUTPATIENT
Start: 2021-06-21 | End: 2021-07-18 | Stop reason: SDUPTHER

## 2021-06-21 RX ADMIN — HEPARIN 500 UNITS: 100 SYRINGE at 02:06

## 2021-06-21 RX ADMIN — SODIUM CHLORIDE 1000 ML: 0.9 INJECTION, SOLUTION INTRAVENOUS at 01:06

## 2021-06-22 ENCOUNTER — INFUSION (OUTPATIENT)
Dept: INFUSION THERAPY | Facility: HOSPITAL | Age: 51
End: 2021-06-22
Attending: INTERNAL MEDICINE
Payer: MEDICAID

## 2021-06-22 ENCOUNTER — OFFICE VISIT (OUTPATIENT)
Dept: HEMATOLOGY/ONCOLOGY | Facility: CLINIC | Age: 51
End: 2021-06-22
Payer: MEDICAID

## 2021-06-22 VITALS
HEIGHT: 67 IN | WEIGHT: 198.63 LBS | RESPIRATION RATE: 16 BRPM | TEMPERATURE: 98 F | DIASTOLIC BLOOD PRESSURE: 88 MMHG | HEART RATE: 62 BPM | OXYGEN SATURATION: 96 % | BODY MASS INDEX: 31.18 KG/M2 | SYSTOLIC BLOOD PRESSURE: 149 MMHG

## 2021-06-22 VITALS
HEIGHT: 67 IN | BODY MASS INDEX: 31.18 KG/M2 | SYSTOLIC BLOOD PRESSURE: 152 MMHG | HEART RATE: 76 BPM | WEIGHT: 198.63 LBS | TEMPERATURE: 98 F | OXYGEN SATURATION: 97 % | DIASTOLIC BLOOD PRESSURE: 98 MMHG

## 2021-06-22 DIAGNOSIS — C50.811 MALIGNANT NEOPLASM OF OVERLAPPING SITES OF RIGHT BREAST IN FEMALE, ESTROGEN RECEPTOR POSITIVE: ICD-10-CM

## 2021-06-22 DIAGNOSIS — C79.51 SECONDARY CANCER OF BONE: ICD-10-CM

## 2021-06-22 DIAGNOSIS — C73 PAPILLARY THYROID CARCINOMA: Primary | ICD-10-CM

## 2021-06-22 DIAGNOSIS — Z17.0 MALIGNANT NEOPLASM OF OVERLAPPING SITES OF RIGHT BREAST IN FEMALE, ESTROGEN RECEPTOR POSITIVE: ICD-10-CM

## 2021-06-22 DIAGNOSIS — R94.6 THYROID FUNCTION STUDY ABNORMALITY: ICD-10-CM

## 2021-06-22 DIAGNOSIS — C53.0 MALIGNANT NEOPLASM OF ENDOCERVIX: Primary | ICD-10-CM

## 2021-06-22 PROCEDURE — 96417 CHEMO IV INFUS EACH ADDL SEQ: CPT

## 2021-06-22 PROCEDURE — 96415 CHEMO IV INFUSION ADDL HR: CPT

## 2021-06-22 PROCEDURE — 25000003 PHARM REV CODE 250: Performed by: INTERNAL MEDICINE

## 2021-06-22 PROCEDURE — 99214 PR OFFICE/OUTPT VISIT, EST, LEVL IV, 30-39 MIN: ICD-10-PCS | Mod: S$PBB,,, | Performed by: INTERNAL MEDICINE

## 2021-06-22 PROCEDURE — 96375 TX/PRO/DX INJ NEW DRUG ADDON: CPT

## 2021-06-22 PROCEDURE — 99999 PR PBB SHADOW E&M-EST. PATIENT-LVL IV: CPT | Mod: PBBFAC,,, | Performed by: INTERNAL MEDICINE

## 2021-06-22 PROCEDURE — 96366 THER/PROPH/DIAG IV INF ADDON: CPT

## 2021-06-22 PROCEDURE — 63600175 PHARM REV CODE 636 W HCPCS: Performed by: INTERNAL MEDICINE

## 2021-06-22 PROCEDURE — 96367 TX/PROPH/DG ADDL SEQ IV INF: CPT

## 2021-06-22 PROCEDURE — 99214 OFFICE O/P EST MOD 30 MIN: CPT | Mod: PBBFAC | Performed by: INTERNAL MEDICINE

## 2021-06-22 PROCEDURE — 99999 PR PBB SHADOW E&M-EST. PATIENT-LVL IV: ICD-10-PCS | Mod: PBBFAC,,, | Performed by: INTERNAL MEDICINE

## 2021-06-22 PROCEDURE — 96413 CHEMO IV INFUSION 1 HR: CPT

## 2021-06-22 PROCEDURE — 99214 OFFICE O/P EST MOD 30 MIN: CPT | Mod: S$PBB,,, | Performed by: INTERNAL MEDICINE

## 2021-06-22 RX ORDER — DIPHENHYDRAMINE HYDROCHLORIDE 50 MG/ML
50 INJECTION INTRAMUSCULAR; INTRAVENOUS ONCE AS NEEDED
Status: CANCELLED | OUTPATIENT
Start: 2021-06-22

## 2021-06-22 RX ORDER — SODIUM CHLORIDE 0.9 % (FLUSH) 0.9 %
10 SYRINGE (ML) INJECTION
Status: CANCELLED | OUTPATIENT
Start: 2021-06-23

## 2021-06-22 RX ORDER — HEPARIN 100 UNIT/ML
500 SYRINGE INTRAVENOUS
Status: CANCELLED | OUTPATIENT
Start: 2021-07-08

## 2021-06-22 RX ORDER — HEPARIN 100 UNIT/ML
500 SYRINGE INTRAVENOUS
Status: DISCONTINUED | OUTPATIENT
Start: 2021-06-22 | End: 2021-06-22 | Stop reason: HOSPADM

## 2021-06-22 RX ORDER — EPINEPHRINE 0.3 MG/.3ML
0.3 INJECTION SUBCUTANEOUS ONCE AS NEEDED
Status: CANCELLED | OUTPATIENT
Start: 2021-06-22

## 2021-06-22 RX ORDER — HEPARIN 100 UNIT/ML
500 SYRINGE INTRAVENOUS
Status: CANCELLED | OUTPATIENT
Start: 2021-06-22

## 2021-06-22 RX ORDER — LEVOTHYROXINE SODIUM 175 UG/1
175 TABLET ORAL DAILY
Qty: 30 TABLET | Refills: 2 | Status: SHIPPED | OUTPATIENT
Start: 2021-06-22 | End: 2021-08-03 | Stop reason: SDUPTHER

## 2021-06-22 RX ORDER — FAMOTIDINE 10 MG/ML
20 INJECTION INTRAVENOUS
Status: CANCELLED | OUTPATIENT
Start: 2021-06-22

## 2021-06-22 RX ORDER — ZOLEDRONIC ACID 0.04 MG/ML
4 INJECTION, SOLUTION INTRAVENOUS
Status: COMPLETED | OUTPATIENT
Start: 2021-06-22 | End: 2021-06-22

## 2021-06-22 RX ORDER — SODIUM CHLORIDE 0.9 % (FLUSH) 0.9 %
10 SYRINGE (ML) INJECTION
Status: CANCELLED | OUTPATIENT
Start: 2021-06-22

## 2021-06-22 RX ORDER — SODIUM CHLORIDE 0.9 % (FLUSH) 0.9 %
10 SYRINGE (ML) INJECTION
Status: CANCELLED | OUTPATIENT
Start: 2021-07-08

## 2021-06-22 RX ORDER — FAMOTIDINE 10 MG/ML
20 INJECTION INTRAVENOUS
Status: COMPLETED | OUTPATIENT
Start: 2021-06-22 | End: 2021-06-22

## 2021-06-22 RX ORDER — LEVOTHYROXINE SODIUM 175 UG/1
175 TABLET ORAL DAILY
Qty: 30 TABLET | Refills: 2 | Status: CANCELLED | OUTPATIENT
Start: 2021-06-22 | End: 2021-09-20

## 2021-06-22 RX ORDER — HEPARIN 100 UNIT/ML
500 SYRINGE INTRAVENOUS
Status: CANCELLED | OUTPATIENT
Start: 2021-06-23

## 2021-06-22 RX ADMIN — ZOLEDRONIC ACID 4 MG: 0.04 INJECTION, SOLUTION INTRAVENOUS at 04:06

## 2021-06-22 RX ADMIN — HEPARIN 500 UNITS: 100 SYRINGE at 05:06

## 2021-06-22 RX ADMIN — PALONOSETRON HYDROCHLORIDE 0.25 MG: 0.25 INJECTION, SOLUTION INTRAVENOUS at 11:06

## 2021-06-22 RX ADMIN — PACLITAXEL 378 MG: 6 INJECTION, SOLUTION INTRAVENOUS at 12:06

## 2021-06-22 RX ADMIN — POTASSIUM CHLORIDE: 2 INJECTION, SOLUTION, CONCENTRATE INTRAVENOUS at 09:06

## 2021-06-22 RX ADMIN — APREPITANT 130 MG: 130 INJECTION, EMULSION INTRAVENOUS at 11:06

## 2021-06-22 RX ADMIN — BEVACIZUMAB 1480 MG: 400 INJECTION, SOLUTION INTRAVENOUS at 04:06

## 2021-06-22 RX ADMIN — CISPLATIN 108 MG: 100 INJECTION, SOLUTION INTRAVENOUS at 03:06

## 2021-06-22 RX ADMIN — DIPHENHYDRAMINE HYDROCHLORIDE 50 MG: 50 INJECTION, SOLUTION INTRAMUSCULAR; INTRAVENOUS at 11:06

## 2021-06-22 RX ADMIN — FAMOTIDINE 20 MG: 10 INJECTION INTRAVENOUS at 12:06

## 2021-06-23 ENCOUNTER — INFUSION (OUTPATIENT)
Dept: INFUSION THERAPY | Facility: HOSPITAL | Age: 51
End: 2021-06-23
Attending: INTERNAL MEDICINE
Payer: MEDICAID

## 2021-06-23 VITALS
OXYGEN SATURATION: 97 % | HEART RATE: 75 BPM | TEMPERATURE: 98 F | SYSTOLIC BLOOD PRESSURE: 135 MMHG | RESPIRATION RATE: 16 BRPM | DIASTOLIC BLOOD PRESSURE: 74 MMHG

## 2021-06-23 DIAGNOSIS — C79.51 SECONDARY CANCER OF BONE: ICD-10-CM

## 2021-06-23 DIAGNOSIS — T45.1X5A CHEMOTHERAPY-INDUCED NAUSEA: Primary | ICD-10-CM

## 2021-06-23 DIAGNOSIS — R11.0 CHEMOTHERAPY-INDUCED NAUSEA: Primary | ICD-10-CM

## 2021-06-23 PROCEDURE — 25000003 PHARM REV CODE 250: Performed by: INTERNAL MEDICINE

## 2021-06-23 PROCEDURE — 63600175 PHARM REV CODE 636 W HCPCS: Performed by: NURSE PRACTITIONER

## 2021-06-23 PROCEDURE — 96360 HYDRATION IV INFUSION INIT: CPT

## 2021-06-23 RX ORDER — HEPARIN 100 UNIT/ML
500 SYRINGE INTRAVENOUS
Status: DISCONTINUED | OUTPATIENT
Start: 2021-06-23 | End: 2021-06-23 | Stop reason: HOSPADM

## 2021-06-23 RX ORDER — HEPARIN 100 UNIT/ML
500 SYRINGE INTRAVENOUS
Status: CANCELLED | OUTPATIENT
Start: 2021-06-23

## 2021-06-23 RX ORDER — SODIUM CHLORIDE 9 MG/ML
1000 INJECTION, SOLUTION INTRAVENOUS
Status: COMPLETED | OUTPATIENT
Start: 2021-06-23 | End: 2021-06-23

## 2021-06-23 RX ORDER — SODIUM CHLORIDE 0.9 % (FLUSH) 0.9 %
10 SYRINGE (ML) INJECTION
Status: CANCELLED | OUTPATIENT
Start: 2021-06-23

## 2021-06-23 RX ADMIN — HEPARIN 500 UNITS: 100 SYRINGE at 02:06

## 2021-06-23 RX ADMIN — SODIUM CHLORIDE 1000 ML: 0.9 INJECTION, SOLUTION INTRAVENOUS at 01:06

## 2021-06-25 ENCOUNTER — TELEPHONE (OUTPATIENT)
Dept: HEMATOLOGY/ONCOLOGY | Facility: CLINIC | Age: 51
End: 2021-06-25

## 2021-06-25 ENCOUNTER — INFUSION (OUTPATIENT)
Dept: INFUSION THERAPY | Facility: HOSPITAL | Age: 51
End: 2021-06-25
Attending: INTERNAL MEDICINE
Payer: MEDICAID

## 2021-06-25 VITALS
RESPIRATION RATE: 18 BRPM | HEART RATE: 68 BPM | DIASTOLIC BLOOD PRESSURE: 86 MMHG | TEMPERATURE: 98 F | OXYGEN SATURATION: 100 % | SYSTOLIC BLOOD PRESSURE: 145 MMHG

## 2021-06-25 DIAGNOSIS — R11.0 CHEMOTHERAPY-INDUCED NAUSEA: Primary | ICD-10-CM

## 2021-06-25 DIAGNOSIS — C79.51 SECONDARY CANCER OF BONE: ICD-10-CM

## 2021-06-25 DIAGNOSIS — T45.1X5A CHEMOTHERAPY-INDUCED NAUSEA: Primary | ICD-10-CM

## 2021-06-25 PROCEDURE — 63600175 PHARM REV CODE 636 W HCPCS: Performed by: NURSE PRACTITIONER

## 2021-06-25 PROCEDURE — 25000003 PHARM REV CODE 250: Performed by: INTERNAL MEDICINE

## 2021-06-25 PROCEDURE — 96360 HYDRATION IV INFUSION INIT: CPT

## 2021-06-25 RX ORDER — SODIUM CHLORIDE 9 MG/ML
1000 INJECTION, SOLUTION INTRAVENOUS
Status: COMPLETED | OUTPATIENT
Start: 2021-06-25 | End: 2021-06-25

## 2021-06-25 RX ORDER — SODIUM CHLORIDE 0.9 % (FLUSH) 0.9 %
10 SYRINGE (ML) INJECTION
Status: CANCELLED | OUTPATIENT
Start: 2021-06-25

## 2021-06-25 RX ORDER — HEPARIN 100 UNIT/ML
500 SYRINGE INTRAVENOUS
Status: CANCELLED | OUTPATIENT
Start: 2021-06-25

## 2021-06-25 RX ORDER — HEPARIN 100 UNIT/ML
500 SYRINGE INTRAVENOUS
Status: DISCONTINUED | OUTPATIENT
Start: 2021-06-25 | End: 2021-06-25 | Stop reason: HOSPADM

## 2021-06-25 RX ADMIN — HEPARIN 500 UNITS: 100 SYRINGE at 02:06

## 2021-06-25 RX ADMIN — SODIUM CHLORIDE 1000 ML: 0.9 INJECTION, SOLUTION INTRAVENOUS at 01:06

## 2021-06-28 ENCOUNTER — TELEPHONE (OUTPATIENT)
Dept: INFUSION THERAPY | Facility: HOSPITAL | Age: 51
End: 2021-06-28

## 2021-06-30 ENCOUNTER — INFUSION (OUTPATIENT)
Dept: INFUSION THERAPY | Facility: HOSPITAL | Age: 51
End: 2021-06-30
Attending: INTERNAL MEDICINE
Payer: MEDICAID

## 2021-06-30 VITALS
RESPIRATION RATE: 16 BRPM | SYSTOLIC BLOOD PRESSURE: 128 MMHG | DIASTOLIC BLOOD PRESSURE: 81 MMHG | HEART RATE: 79 BPM | TEMPERATURE: 98 F | OXYGEN SATURATION: 99 %

## 2021-06-30 DIAGNOSIS — C79.51 SECONDARY CANCER OF BONE: ICD-10-CM

## 2021-06-30 DIAGNOSIS — R21 RASH OF GROIN: Primary | ICD-10-CM

## 2021-06-30 DIAGNOSIS — T45.1X5A CHEMOTHERAPY-INDUCED NAUSEA: Primary | ICD-10-CM

## 2021-06-30 DIAGNOSIS — R11.0 CHEMOTHERAPY-INDUCED NAUSEA: Primary | ICD-10-CM

## 2021-06-30 PROCEDURE — 25000003 PHARM REV CODE 250: Performed by: INTERNAL MEDICINE

## 2021-06-30 PROCEDURE — 63600175 PHARM REV CODE 636 W HCPCS: Performed by: NURSE PRACTITIONER

## 2021-06-30 PROCEDURE — 96360 HYDRATION IV INFUSION INIT: CPT

## 2021-06-30 RX ORDER — SODIUM CHLORIDE 0.9 % (FLUSH) 0.9 %
10 SYRINGE (ML) INJECTION
Status: CANCELLED | OUTPATIENT
Start: 2021-06-30

## 2021-06-30 RX ORDER — HEPARIN 100 UNIT/ML
500 SYRINGE INTRAVENOUS
Status: DISCONTINUED | OUTPATIENT
Start: 2021-06-30 | End: 2021-06-30 | Stop reason: HOSPADM

## 2021-06-30 RX ORDER — HEPARIN 100 UNIT/ML
500 SYRINGE INTRAVENOUS
Status: CANCELLED | OUTPATIENT
Start: 2021-06-30

## 2021-06-30 RX ORDER — SODIUM CHLORIDE 9 MG/ML
1000 INJECTION, SOLUTION INTRAVENOUS
Status: COMPLETED | OUTPATIENT
Start: 2021-06-30 | End: 2021-06-30

## 2021-06-30 RX ORDER — CLOTRIMAZOLE 1 %
CREAM (GRAM) TOPICAL
Qty: 60 G | Refills: 0 | Status: SHIPPED | OUTPATIENT
Start: 2021-06-30 | End: 2021-06-30

## 2021-06-30 RX ORDER — CLOTRIMAZOLE 1 %
CREAM (GRAM) TOPICAL
Qty: 60 G | Refills: 0 | Status: SHIPPED | OUTPATIENT
Start: 2021-06-30 | End: 2021-12-09

## 2021-06-30 RX ADMIN — HEPARIN 500 UNITS: 100 SYRINGE at 02:06

## 2021-06-30 RX ADMIN — SODIUM CHLORIDE 1000 ML: 0.9 INJECTION, SOLUTION INTRAVENOUS at 01:06

## 2021-07-01 ENCOUNTER — SPECIALTY PHARMACY (OUTPATIENT)
Dept: PHARMACY | Facility: CLINIC | Age: 51
End: 2021-07-01

## 2021-07-02 ENCOUNTER — INFUSION (OUTPATIENT)
Dept: INFUSION THERAPY | Facility: HOSPITAL | Age: 51
End: 2021-07-02
Attending: INTERNAL MEDICINE
Payer: MEDICAID

## 2021-07-02 VITALS
TEMPERATURE: 97 F | OXYGEN SATURATION: 99 % | DIASTOLIC BLOOD PRESSURE: 70 MMHG | HEART RATE: 72 BPM | SYSTOLIC BLOOD PRESSURE: 133 MMHG | RESPIRATION RATE: 16 BRPM

## 2021-07-02 DIAGNOSIS — L03.90 CELLULITIS, UNSPECIFIED CELLULITIS SITE: Primary | ICD-10-CM

## 2021-07-02 DIAGNOSIS — T45.1X5A CHEMOTHERAPY-INDUCED NAUSEA: Primary | ICD-10-CM

## 2021-07-02 DIAGNOSIS — C79.51 SECONDARY CANCER OF BONE: ICD-10-CM

## 2021-07-02 DIAGNOSIS — R11.0 CHEMOTHERAPY-INDUCED NAUSEA: Primary | ICD-10-CM

## 2021-07-02 PROCEDURE — 96360 HYDRATION IV INFUSION INIT: CPT

## 2021-07-02 PROCEDURE — 63600175 PHARM REV CODE 636 W HCPCS: Performed by: NURSE PRACTITIONER

## 2021-07-02 PROCEDURE — A4216 STERILE WATER/SALINE, 10 ML: HCPCS | Performed by: NURSE PRACTITIONER

## 2021-07-02 PROCEDURE — 25000003 PHARM REV CODE 250: Performed by: INTERNAL MEDICINE

## 2021-07-02 PROCEDURE — 25000003 PHARM REV CODE 250: Performed by: NURSE PRACTITIONER

## 2021-07-02 RX ORDER — SODIUM CHLORIDE 0.9 % (FLUSH) 0.9 %
10 SYRINGE (ML) INJECTION
Status: CANCELLED | OUTPATIENT
Start: 2021-07-02

## 2021-07-02 RX ORDER — CEPHALEXIN 500 MG/1
500 CAPSULE ORAL EVERY 6 HOURS
Qty: 20 CAPSULE | Refills: 0 | Status: SHIPPED | OUTPATIENT
Start: 2021-07-02 | End: 2021-07-12 | Stop reason: ALTCHOICE

## 2021-07-02 RX ORDER — SODIUM CHLORIDE 9 MG/ML
1000 INJECTION, SOLUTION INTRAVENOUS
Status: COMPLETED | OUTPATIENT
Start: 2021-07-02 | End: 2021-07-02

## 2021-07-02 RX ORDER — HEPARIN 100 UNIT/ML
500 SYRINGE INTRAVENOUS
Status: DISCONTINUED | OUTPATIENT
Start: 2021-07-02 | End: 2021-07-02 | Stop reason: HOSPADM

## 2021-07-02 RX ORDER — SODIUM CHLORIDE 0.9 % (FLUSH) 0.9 %
10 SYRINGE (ML) INJECTION
Status: DISCONTINUED | OUTPATIENT
Start: 2021-07-02 | End: 2021-07-02 | Stop reason: HOSPADM

## 2021-07-02 RX ORDER — HEPARIN 100 UNIT/ML
500 SYRINGE INTRAVENOUS
Status: CANCELLED | OUTPATIENT
Start: 2021-07-02

## 2021-07-02 RX ADMIN — HEPARIN 500 UNITS: 100 SYRINGE at 02:07

## 2021-07-02 RX ADMIN — Medication 10 ML: at 02:07

## 2021-07-02 RX ADMIN — SODIUM CHLORIDE 1000 ML: 0.9 INJECTION, SOLUTION INTRAVENOUS at 01:07

## 2021-07-03 DIAGNOSIS — G89.3 CANCER ASSOCIATED PAIN: ICD-10-CM

## 2021-07-06 ENCOUNTER — DOCUMENTATION ONLY (OUTPATIENT)
Dept: HEMATOLOGY/ONCOLOGY | Facility: CLINIC | Age: 51
End: 2021-07-06

## 2021-07-06 RX ORDER — HYDROCODONE BITARTRATE AND ACETAMINOPHEN 10; 325 MG/1; MG/1
1 TABLET ORAL EVERY 4 HOURS PRN
Qty: 120 TABLET | Refills: 0 | Status: SHIPPED | OUTPATIENT
Start: 2021-07-06 | End: 2021-07-26 | Stop reason: SDUPTHER

## 2021-07-07 ENCOUNTER — INFUSION (OUTPATIENT)
Dept: INFUSION THERAPY | Facility: HOSPITAL | Age: 51
End: 2021-07-07
Attending: INTERNAL MEDICINE
Payer: MEDICAID

## 2021-07-07 VITALS
HEART RATE: 80 BPM | TEMPERATURE: 98 F | DIASTOLIC BLOOD PRESSURE: 75 MMHG | OXYGEN SATURATION: 98 % | SYSTOLIC BLOOD PRESSURE: 122 MMHG | RESPIRATION RATE: 18 BRPM

## 2021-07-07 DIAGNOSIS — C53.0 MALIGNANT NEOPLASM OF ENDOCERVIX: Primary | ICD-10-CM

## 2021-07-07 DIAGNOSIS — T45.1X5A CHEMOTHERAPY-INDUCED NAUSEA: ICD-10-CM

## 2021-07-07 DIAGNOSIS — R11.0 CHEMOTHERAPY-INDUCED NAUSEA: ICD-10-CM

## 2021-07-07 DIAGNOSIS — C79.51 SECONDARY CANCER OF BONE: ICD-10-CM

## 2021-07-07 PROCEDURE — 25000003 PHARM REV CODE 250: Performed by: INTERNAL MEDICINE

## 2021-07-07 PROCEDURE — 63600175 PHARM REV CODE 636 W HCPCS: Performed by: NURSE PRACTITIONER

## 2021-07-07 PROCEDURE — 96360 HYDRATION IV INFUSION INIT: CPT

## 2021-07-07 RX ORDER — HEPARIN 100 UNIT/ML
500 SYRINGE INTRAVENOUS
Status: DISCONTINUED | OUTPATIENT
Start: 2021-07-07 | End: 2021-07-07 | Stop reason: HOSPADM

## 2021-07-07 RX ORDER — SODIUM CHLORIDE 9 MG/ML
1000 INJECTION, SOLUTION INTRAVENOUS
Status: COMPLETED | OUTPATIENT
Start: 2021-07-07 | End: 2021-07-07

## 2021-07-07 RX ORDER — HEPARIN 100 UNIT/ML
500 SYRINGE INTRAVENOUS
Status: CANCELLED | OUTPATIENT
Start: 2021-07-07

## 2021-07-07 RX ORDER — SODIUM CHLORIDE 0.9 % (FLUSH) 0.9 %
10 SYRINGE (ML) INJECTION
Status: CANCELLED | OUTPATIENT
Start: 2021-07-07

## 2021-07-07 RX ADMIN — SODIUM CHLORIDE 1000 ML: 0.9 INJECTION, SOLUTION INTRAVENOUS at 03:07

## 2021-07-07 RX ADMIN — HEPARIN 500 UNITS: 100 SYRINGE at 04:07

## 2021-07-09 ENCOUNTER — INFUSION (OUTPATIENT)
Dept: INFUSION THERAPY | Facility: HOSPITAL | Age: 51
End: 2021-07-09
Attending: INTERNAL MEDICINE
Payer: MEDICAID

## 2021-07-09 ENCOUNTER — TELEPHONE (OUTPATIENT)
Dept: RADIOLOGY | Facility: HOSPITAL | Age: 51
End: 2021-07-09

## 2021-07-09 VITALS
SYSTOLIC BLOOD PRESSURE: 137 MMHG | RESPIRATION RATE: 18 BRPM | HEART RATE: 80 BPM | DIASTOLIC BLOOD PRESSURE: 83 MMHG | OXYGEN SATURATION: 99 % | TEMPERATURE: 97 F

## 2021-07-09 DIAGNOSIS — T45.1X5A CHEMOTHERAPY-INDUCED NAUSEA: Primary | ICD-10-CM

## 2021-07-09 DIAGNOSIS — C79.51 SECONDARY CANCER OF BONE: ICD-10-CM

## 2021-07-09 DIAGNOSIS — R11.0 CHEMOTHERAPY-INDUCED NAUSEA: Primary | ICD-10-CM

## 2021-07-09 PROCEDURE — 63600175 PHARM REV CODE 636 W HCPCS: Performed by: NURSE PRACTITIONER

## 2021-07-09 PROCEDURE — 25000003 PHARM REV CODE 250: Performed by: INTERNAL MEDICINE

## 2021-07-09 PROCEDURE — 96360 HYDRATION IV INFUSION INIT: CPT

## 2021-07-09 RX ORDER — SODIUM CHLORIDE 9 MG/ML
1000 INJECTION, SOLUTION INTRAVENOUS
Status: COMPLETED | OUTPATIENT
Start: 2021-07-09 | End: 2021-07-09

## 2021-07-09 RX ORDER — SODIUM CHLORIDE 0.9 % (FLUSH) 0.9 %
10 SYRINGE (ML) INJECTION
Status: CANCELLED | OUTPATIENT
Start: 2021-07-09

## 2021-07-09 RX ORDER — HEPARIN 100 UNIT/ML
500 SYRINGE INTRAVENOUS
Status: CANCELLED | OUTPATIENT
Start: 2021-07-09

## 2021-07-09 RX ORDER — HEPARIN 100 UNIT/ML
500 SYRINGE INTRAVENOUS
Status: DISCONTINUED | OUTPATIENT
Start: 2021-07-09 | End: 2021-07-09 | Stop reason: HOSPADM

## 2021-07-09 RX ADMIN — HEPARIN 500 UNITS: 100 SYRINGE at 02:07

## 2021-07-09 RX ADMIN — SODIUM CHLORIDE 1000 ML: 0.9 INJECTION, SOLUTION INTRAVENOUS at 01:07

## 2021-07-12 ENCOUNTER — HOSPITAL ENCOUNTER (OUTPATIENT)
Dept: RADIOLOGY | Facility: HOSPITAL | Age: 51
Discharge: HOME | End: 2021-07-12
Attending: INTERNAL MEDICINE
Payer: MEDICAID

## 2021-07-12 ENCOUNTER — OFFICE VISIT (OUTPATIENT)
Dept: HEMATOLOGY/ONCOLOGY | Facility: CLINIC | Age: 51
End: 2021-07-12
Payer: MEDICAID

## 2021-07-12 VITALS
HEIGHT: 67 IN | BODY MASS INDEX: 30.07 KG/M2 | HEART RATE: 72 BPM | WEIGHT: 191.56 LBS | OXYGEN SATURATION: 98 % | SYSTOLIC BLOOD PRESSURE: 127 MMHG | TEMPERATURE: 97 F | DIASTOLIC BLOOD PRESSURE: 83 MMHG

## 2021-07-12 DIAGNOSIS — H66.001 ACUTE SUPPURATIVE OTITIS MEDIA OF RIGHT EAR WITHOUT SPONTANEOUS RUPTURE OF TYMPANIC MEMBRANE, RECURRENCE NOT SPECIFIED: Primary | ICD-10-CM

## 2021-07-12 DIAGNOSIS — C79.51 SECONDARY CANCER OF BONE: ICD-10-CM

## 2021-07-12 DIAGNOSIS — C50.919 PRIMARY MALIGNANT NEOPLASM OF BREAST WITH METASTASIS TO OTHER SITE, UNSPECIFIED LATERALITY: ICD-10-CM

## 2021-07-12 DIAGNOSIS — C77.3 MALIGNANT NEOPLASM METASTATIC TO LYMPH NODE OF AXILLA: ICD-10-CM

## 2021-07-12 DIAGNOSIS — Z17.0 MALIGNANT NEOPLASM OF OVERLAPPING SITES OF RIGHT BREAST IN FEMALE, ESTROGEN RECEPTOR POSITIVE: ICD-10-CM

## 2021-07-12 DIAGNOSIS — C50.811 MALIGNANT NEOPLASM OF OVERLAPPING SITES OF RIGHT BREAST IN FEMALE, ESTROGEN RECEPTOR POSITIVE: ICD-10-CM

## 2021-07-12 PROCEDURE — 78815 NM PET CT ROUTINE: ICD-10-PCS | Mod: 26,PS,, | Performed by: RADIOLOGY

## 2021-07-12 PROCEDURE — 99999 PR PBB SHADOW E&M-EST. PATIENT-LVL V: ICD-10-PCS | Mod: PBBFAC,,, | Performed by: NURSE PRACTITIONER

## 2021-07-12 PROCEDURE — 78815 PET IMAGE W/CT SKULL-THIGH: CPT | Mod: 26,PS,, | Performed by: RADIOLOGY

## 2021-07-12 PROCEDURE — 99214 PR OFFICE/OUTPT VISIT, EST, LEVL IV, 30-39 MIN: ICD-10-PCS | Mod: S$PBB,,, | Performed by: NURSE PRACTITIONER

## 2021-07-12 PROCEDURE — 99999 PR PBB SHADOW E&M-EST. PATIENT-LVL V: CPT | Mod: PBBFAC,,, | Performed by: NURSE PRACTITIONER

## 2021-07-12 PROCEDURE — 78815 PET IMAGE W/CT SKULL-THIGH: CPT | Mod: TC

## 2021-07-12 PROCEDURE — 99215 OFFICE O/P EST HI 40 MIN: CPT | Mod: PBBFAC,25 | Performed by: NURSE PRACTITIONER

## 2021-07-12 PROCEDURE — 99214 OFFICE O/P EST MOD 30 MIN: CPT | Mod: S$PBB,,, | Performed by: NURSE PRACTITIONER

## 2021-07-12 RX ORDER — AMOXICILLIN AND CLAVULANATE POTASSIUM 875; 125 MG/1; MG/1
1 TABLET, FILM COATED ORAL 2 TIMES DAILY
Qty: 20 TABLET | Refills: 0 | Status: SHIPPED | OUTPATIENT
Start: 2021-07-12 | End: 2021-07-22

## 2021-07-13 ENCOUNTER — OFFICE VISIT (OUTPATIENT)
Dept: HEMATOLOGY/ONCOLOGY | Facility: CLINIC | Age: 51
End: 2021-07-13
Payer: MEDICAID

## 2021-07-13 ENCOUNTER — INFUSION (OUTPATIENT)
Dept: INFUSION THERAPY | Facility: HOSPITAL | Age: 51
End: 2021-07-13
Attending: INTERNAL MEDICINE
Payer: MEDICAID

## 2021-07-13 VITALS
SYSTOLIC BLOOD PRESSURE: 128 MMHG | RESPIRATION RATE: 16 BRPM | HEIGHT: 67 IN | WEIGHT: 191.13 LBS | DIASTOLIC BLOOD PRESSURE: 78 MMHG | HEART RATE: 61 BPM | BODY MASS INDEX: 30 KG/M2 | OXYGEN SATURATION: 98 % | TEMPERATURE: 97 F

## 2021-07-13 VITALS
HEART RATE: 89 BPM | TEMPERATURE: 97 F | OXYGEN SATURATION: 99 % | BODY MASS INDEX: 30 KG/M2 | HEIGHT: 67 IN | WEIGHT: 191.13 LBS | SYSTOLIC BLOOD PRESSURE: 136 MMHG | DIASTOLIC BLOOD PRESSURE: 90 MMHG

## 2021-07-13 DIAGNOSIS — C53.0 MALIGNANT NEOPLASM OF ENDOCERVIX: Primary | ICD-10-CM

## 2021-07-13 DIAGNOSIS — C73 PAPILLARY THYROID CARCINOMA: ICD-10-CM

## 2021-07-13 DIAGNOSIS — C50.919 PRIMARY MALIGNANT NEOPLASM OF BREAST WITH METASTASIS TO OTHER SITE, UNSPECIFIED LATERALITY: ICD-10-CM

## 2021-07-13 DIAGNOSIS — C79.51 SECONDARY CANCER OF BONE: ICD-10-CM

## 2021-07-13 PROCEDURE — 99214 OFFICE O/P EST MOD 30 MIN: CPT | Mod: PBBFAC,25 | Performed by: INTERNAL MEDICINE

## 2021-07-13 PROCEDURE — 99215 PR OFFICE/OUTPT VISIT, EST, LEVL V, 40-54 MIN: ICD-10-PCS | Mod: S$PBB,,, | Performed by: INTERNAL MEDICINE

## 2021-07-13 PROCEDURE — 99999 PR PBB SHADOW E&M-EST. PATIENT-LVL IV: ICD-10-PCS | Mod: PBBFAC,,, | Performed by: INTERNAL MEDICINE

## 2021-07-13 PROCEDURE — 25000003 PHARM REV CODE 250: Performed by: INTERNAL MEDICINE

## 2021-07-13 PROCEDURE — 99999 PR PBB SHADOW E&M-EST. PATIENT-LVL IV: CPT | Mod: PBBFAC,,, | Performed by: INTERNAL MEDICINE

## 2021-07-13 PROCEDURE — 96361 HYDRATE IV INFUSION ADD-ON: CPT

## 2021-07-13 PROCEDURE — 99215 OFFICE O/P EST HI 40 MIN: CPT | Mod: S$PBB,,, | Performed by: INTERNAL MEDICINE

## 2021-07-13 PROCEDURE — 96413 CHEMO IV INFUSION 1 HR: CPT

## 2021-07-13 PROCEDURE — 63600175 PHARM REV CODE 636 W HCPCS: Performed by: INTERNAL MEDICINE

## 2021-07-13 RX ORDER — HEPARIN 100 UNIT/ML
500 SYRINGE INTRAVENOUS
Status: DISCONTINUED | OUTPATIENT
Start: 2021-07-13 | End: 2021-07-13 | Stop reason: HOSPADM

## 2021-07-13 RX ORDER — DIPHENHYDRAMINE HYDROCHLORIDE 50 MG/ML
25 INJECTION INTRAMUSCULAR; INTRAVENOUS EVERY 10 MIN PRN
Status: CANCELLED | OUTPATIENT
Start: 2021-07-13 | End: 2021-07-14

## 2021-07-13 RX ORDER — HEPARIN 100 UNIT/ML
500 SYRINGE INTRAVENOUS
Status: CANCELLED | OUTPATIENT
Start: 2021-07-13

## 2021-07-13 RX ORDER — METHYLPREDNISOLONE SOD SUCC 125 MG
125 VIAL (EA) INJECTION ONCE AS NEEDED
Status: CANCELLED | OUTPATIENT
Start: 2021-07-13

## 2021-07-13 RX ORDER — SODIUM CHLORIDE 0.9 % (FLUSH) 0.9 %
10 SYRINGE (ML) INJECTION
Status: CANCELLED | OUTPATIENT
Start: 2021-07-13

## 2021-07-13 RX ORDER — EPINEPHRINE 0.3 MG/.3ML
0.3 INJECTION SUBCUTANEOUS ONCE AS NEEDED
Status: CANCELLED | OUTPATIENT
Start: 2021-07-13

## 2021-07-13 RX ADMIN — HEPARIN 500 UNITS: 100 SYRINGE at 03:07

## 2021-07-13 RX ADMIN — BEVACIZUMAB 1300 MG: 400 INJECTION, SOLUTION INTRAVENOUS at 01:07

## 2021-07-13 RX ADMIN — SODIUM CHLORIDE 1000 ML: 0.9 INJECTION, SOLUTION INTRAVENOUS at 01:07

## 2021-07-18 DIAGNOSIS — F41.9 ANXIETY: ICD-10-CM

## 2021-07-18 DIAGNOSIS — G89.3 CANCER ASSOCIATED PAIN: ICD-10-CM

## 2021-07-19 PROBLEM — H66.001 ACUTE SUPPURATIVE OTITIS MEDIA OF RIGHT EAR WITHOUT SPONTANEOUS RUPTURE OF TYMPANIC MEMBRANE: Status: ACTIVE | Noted: 2021-07-19

## 2021-07-20 ENCOUNTER — INFUSION (OUTPATIENT)
Dept: INFUSION THERAPY | Facility: HOSPITAL | Age: 51
End: 2021-07-20
Attending: INTERNAL MEDICINE
Payer: MEDICAID

## 2021-07-20 VITALS
HEART RATE: 70 BPM | TEMPERATURE: 98 F | OXYGEN SATURATION: 97 % | DIASTOLIC BLOOD PRESSURE: 84 MMHG | RESPIRATION RATE: 18 BRPM | SYSTOLIC BLOOD PRESSURE: 140 MMHG

## 2021-07-20 DIAGNOSIS — C79.51 SECONDARY CANCER OF BONE: ICD-10-CM

## 2021-07-20 DIAGNOSIS — R11.0 CHEMOTHERAPY-INDUCED NAUSEA: Primary | ICD-10-CM

## 2021-07-20 DIAGNOSIS — T45.1X5A CHEMOTHERAPY-INDUCED NAUSEA: Primary | ICD-10-CM

## 2021-07-20 PROCEDURE — 25000003 PHARM REV CODE 250: Performed by: INTERNAL MEDICINE

## 2021-07-20 PROCEDURE — 63600175 PHARM REV CODE 636 W HCPCS: Performed by: NURSE PRACTITIONER

## 2021-07-20 PROCEDURE — 96360 HYDRATION IV INFUSION INIT: CPT

## 2021-07-20 RX ORDER — HEPARIN 100 UNIT/ML
500 SYRINGE INTRAVENOUS
Status: DISCONTINUED | OUTPATIENT
Start: 2021-07-20 | End: 2021-07-20 | Stop reason: HOSPADM

## 2021-07-20 RX ORDER — MORPHINE SULFATE 15 MG/1
15 TABLET, FILM COATED, EXTENDED RELEASE ORAL EVERY 12 HOURS
Qty: 60 TABLET | Refills: 0 | Status: SHIPPED | OUTPATIENT
Start: 2021-07-20 | End: 2021-08-16 | Stop reason: SDUPTHER

## 2021-07-20 RX ORDER — ALPRAZOLAM 1 MG/1
.5-1 TABLET ORAL 2 TIMES DAILY PRN
Qty: 60 TABLET | Refills: 0 | Status: SHIPPED | OUTPATIENT
Start: 2021-07-20 | End: 2021-08-16 | Stop reason: SDUPTHER

## 2021-07-20 RX ORDER — HEPARIN 100 UNIT/ML
500 SYRINGE INTRAVENOUS
Status: CANCELLED | OUTPATIENT
Start: 2021-07-20

## 2021-07-20 RX ORDER — SODIUM CHLORIDE 0.9 % (FLUSH) 0.9 %
10 SYRINGE (ML) INJECTION
Status: CANCELLED | OUTPATIENT
Start: 2021-07-20

## 2021-07-20 RX ORDER — SODIUM CHLORIDE 9 MG/ML
1000 INJECTION, SOLUTION INTRAVENOUS
Status: COMPLETED | OUTPATIENT
Start: 2021-07-20 | End: 2021-07-20

## 2021-07-20 RX ADMIN — SODIUM CHLORIDE 1000 ML: 0.9 INJECTION, SOLUTION INTRAVENOUS at 10:07

## 2021-07-20 RX ADMIN — HEPARIN 500 UNITS: 100 SYRINGE at 11:07

## 2021-07-23 ENCOUNTER — INFUSION (OUTPATIENT)
Dept: INFUSION THERAPY | Facility: HOSPITAL | Age: 51
End: 2021-07-23
Attending: INTERNAL MEDICINE
Payer: MEDICAID

## 2021-07-23 VITALS
SYSTOLIC BLOOD PRESSURE: 134 MMHG | HEART RATE: 74 BPM | OXYGEN SATURATION: 98 % | RESPIRATION RATE: 18 BRPM | TEMPERATURE: 98 F | DIASTOLIC BLOOD PRESSURE: 85 MMHG

## 2021-07-23 DIAGNOSIS — R11.0 CHEMOTHERAPY-INDUCED NAUSEA: Primary | ICD-10-CM

## 2021-07-23 DIAGNOSIS — T45.1X5A CHEMOTHERAPY-INDUCED NAUSEA: Primary | ICD-10-CM

## 2021-07-23 DIAGNOSIS — C79.51 SECONDARY CANCER OF BONE: ICD-10-CM

## 2021-07-23 PROCEDURE — 96360 HYDRATION IV INFUSION INIT: CPT

## 2021-07-23 PROCEDURE — 63600175 PHARM REV CODE 636 W HCPCS: Performed by: NURSE PRACTITIONER

## 2021-07-23 PROCEDURE — 25000003 PHARM REV CODE 250: Performed by: INTERNAL MEDICINE

## 2021-07-23 RX ORDER — SODIUM CHLORIDE 0.9 % (FLUSH) 0.9 %
10 SYRINGE (ML) INJECTION
Status: CANCELLED | OUTPATIENT
Start: 2021-07-23

## 2021-07-23 RX ORDER — SODIUM CHLORIDE 9 MG/ML
1000 INJECTION, SOLUTION INTRAVENOUS
Status: COMPLETED | OUTPATIENT
Start: 2021-07-23 | End: 2021-07-23

## 2021-07-23 RX ORDER — HEPARIN 100 UNIT/ML
500 SYRINGE INTRAVENOUS
Status: DISCONTINUED | OUTPATIENT
Start: 2021-07-23 | End: 2021-07-23 | Stop reason: HOSPADM

## 2021-07-23 RX ORDER — HEPARIN 100 UNIT/ML
500 SYRINGE INTRAVENOUS
Status: CANCELLED | OUTPATIENT
Start: 2021-07-23

## 2021-07-23 RX ADMIN — HEPARIN 500 UNITS: 100 SYRINGE at 11:07

## 2021-07-23 RX ADMIN — SODIUM CHLORIDE 1000 ML: 0.9 INJECTION, SOLUTION INTRAVENOUS at 10:07

## 2021-07-24 ENCOUNTER — SPECIALTY PHARMACY (OUTPATIENT)
Dept: PHARMACY | Facility: CLINIC | Age: 51
End: 2021-07-24

## 2021-07-26 DIAGNOSIS — G89.3 CANCER ASSOCIATED PAIN: ICD-10-CM

## 2021-07-27 RX ORDER — HYDROCODONE BITARTRATE AND ACETAMINOPHEN 10; 325 MG/1; MG/1
1 TABLET ORAL EVERY 4 HOURS PRN
Qty: 120 TABLET | Refills: 0 | Status: SHIPPED | OUTPATIENT
Start: 2021-07-27 | End: 2021-08-16 | Stop reason: SDUPTHER

## 2021-08-02 ENCOUNTER — LAB VISIT (OUTPATIENT)
Dept: LAB | Facility: HOSPITAL | Age: 51
End: 2021-08-02
Attending: INTERNAL MEDICINE
Payer: MEDICAID

## 2021-08-02 DIAGNOSIS — C53.0 MALIGNANT NEOPLASM OF ENDOCERVIX: ICD-10-CM

## 2021-08-02 LAB
ALBUMIN SERPL BCP-MCNC: 3.8 G/DL (ref 3.5–5.2)
ALP SERPL-CCNC: 69 U/L (ref 55–135)
ALT SERPL W/O P-5'-P-CCNC: 10 U/L (ref 10–44)
ANION GAP SERPL CALC-SCNC: 13 MMOL/L (ref 8–16)
AST SERPL-CCNC: 16 U/L (ref 10–40)
BASOPHILS # BLD AUTO: 0.05 K/UL (ref 0–0.2)
BASOPHILS NFR BLD: 0.5 % (ref 0–1.9)
BILIRUB SERPL-MCNC: 0.4 MG/DL (ref 0.1–1)
BUN SERPL-MCNC: 15 MG/DL (ref 6–20)
CALCIUM SERPL-MCNC: 9.5 MG/DL (ref 8.7–10.5)
CHLORIDE SERPL-SCNC: 102 MMOL/L (ref 95–110)
CO2 SERPL-SCNC: 26 MMOL/L (ref 23–29)
CREAT SERPL-MCNC: 1.3 MG/DL (ref 0.5–1.4)
DIFFERENTIAL METHOD: ABNORMAL
EOSINOPHIL # BLD AUTO: 0.4 K/UL (ref 0–0.5)
EOSINOPHIL NFR BLD: 3.3 % (ref 0–8)
ERYTHROCYTE [DISTWIDTH] IN BLOOD BY AUTOMATED COUNT: 15.7 % (ref 11.5–14.5)
EST. GFR  (AFRICAN AMERICAN): 55 ML/MIN/1.73 M^2
EST. GFR  (NON AFRICAN AMERICAN): 47 ML/MIN/1.73 M^2
GLUCOSE SERPL-MCNC: 110 MG/DL (ref 70–110)
HCT VFR BLD AUTO: 40.7 % (ref 37–48.5)
HGB BLD-MCNC: 12.7 G/DL (ref 12–16)
IMM GRANULOCYTES # BLD AUTO: 0.06 K/UL (ref 0–0.04)
IMM GRANULOCYTES NFR BLD AUTO: 0.6 % (ref 0–0.5)
LYMPHOCYTES # BLD AUTO: 2.5 K/UL (ref 1–4.8)
LYMPHOCYTES NFR BLD: 22.9 % (ref 18–48)
MCH RBC QN AUTO: 31.1 PG (ref 27–31)
MCHC RBC AUTO-ENTMCNC: 31.2 G/DL (ref 32–36)
MCV RBC AUTO: 100 FL (ref 82–98)
MONOCYTES # BLD AUTO: 0.9 K/UL (ref 0.3–1)
MONOCYTES NFR BLD: 8.1 % (ref 4–15)
NEUTROPHILS # BLD AUTO: 7 K/UL (ref 1.8–7.7)
NEUTROPHILS NFR BLD: 64.6 % (ref 38–73)
NRBC BLD-RTO: 0 /100 WBC
PLATELET # BLD AUTO: 215 K/UL (ref 150–450)
PMV BLD AUTO: 11.8 FL (ref 9.2–12.9)
POTASSIUM SERPL-SCNC: 3.8 MMOL/L (ref 3.5–5.1)
PROT SERPL-MCNC: 7.5 G/DL (ref 6–8.4)
RBC # BLD AUTO: 4.09 M/UL (ref 4–5.4)
SODIUM SERPL-SCNC: 141 MMOL/L (ref 136–145)
WBC # BLD AUTO: 10.79 K/UL (ref 3.9–12.7)

## 2021-08-02 PROCEDURE — 36415 COLL VENOUS BLD VENIPUNCTURE: CPT | Performed by: INTERNAL MEDICINE

## 2021-08-02 PROCEDURE — 85025 COMPLETE CBC W/AUTO DIFF WBC: CPT | Performed by: INTERNAL MEDICINE

## 2021-08-02 PROCEDURE — 80053 COMPREHEN METABOLIC PANEL: CPT | Performed by: INTERNAL MEDICINE

## 2021-08-03 ENCOUNTER — OFFICE VISIT (OUTPATIENT)
Dept: HEMATOLOGY/ONCOLOGY | Facility: CLINIC | Age: 51
End: 2021-08-03
Payer: MEDICAID

## 2021-08-03 ENCOUNTER — INFUSION (OUTPATIENT)
Dept: INFUSION THERAPY | Facility: HOSPITAL | Age: 51
End: 2021-08-03
Attending: INTERNAL MEDICINE
Payer: MEDICAID

## 2021-08-03 VITALS
TEMPERATURE: 97 F | HEART RATE: 97 BPM | WEIGHT: 188.06 LBS | HEIGHT: 67 IN | BODY MASS INDEX: 29.52 KG/M2 | OXYGEN SATURATION: 100 % | SYSTOLIC BLOOD PRESSURE: 138 MMHG | DIASTOLIC BLOOD PRESSURE: 89 MMHG

## 2021-08-03 VITALS
RESPIRATION RATE: 16 BRPM | HEART RATE: 86 BPM | SYSTOLIC BLOOD PRESSURE: 127 MMHG | DIASTOLIC BLOOD PRESSURE: 58 MMHG | OXYGEN SATURATION: 98 % | TEMPERATURE: 98 F

## 2021-08-03 DIAGNOSIS — Z79.811 AROMATASE INHIBITOR USE: ICD-10-CM

## 2021-08-03 DIAGNOSIS — C50.919 PRIMARY MALIGNANT NEOPLASM OF BREAST WITH METASTASIS TO OTHER SITE, UNSPECIFIED LATERALITY: ICD-10-CM

## 2021-08-03 DIAGNOSIS — C79.51 SECONDARY CANCER OF BONE: ICD-10-CM

## 2021-08-03 DIAGNOSIS — C53.0 MALIGNANT NEOPLASM OF ENDOCERVIX: Primary | ICD-10-CM

## 2021-08-03 DIAGNOSIS — E03.9 HYPOTHYROIDISM, UNSPECIFIED TYPE: ICD-10-CM

## 2021-08-03 PROCEDURE — 99999 PR PBB SHADOW E&M-EST. PATIENT-LVL IV: CPT | Mod: PBBFAC,,, | Performed by: INTERNAL MEDICINE

## 2021-08-03 PROCEDURE — 25000003 PHARM REV CODE 250: Performed by: INTERNAL MEDICINE

## 2021-08-03 PROCEDURE — 99215 OFFICE O/P EST HI 40 MIN: CPT | Mod: S$PBB,,, | Performed by: INTERNAL MEDICINE

## 2021-08-03 PROCEDURE — 96361 HYDRATE IV INFUSION ADD-ON: CPT

## 2021-08-03 PROCEDURE — 99214 OFFICE O/P EST MOD 30 MIN: CPT | Mod: PBBFAC,25 | Performed by: INTERNAL MEDICINE

## 2021-08-03 PROCEDURE — A4216 STERILE WATER/SALINE, 10 ML: HCPCS | Performed by: INTERNAL MEDICINE

## 2021-08-03 PROCEDURE — 99215 PR OFFICE/OUTPT VISIT, EST, LEVL V, 40-54 MIN: ICD-10-PCS | Mod: S$PBB,,, | Performed by: INTERNAL MEDICINE

## 2021-08-03 PROCEDURE — 63600175 PHARM REV CODE 636 W HCPCS: Performed by: INTERNAL MEDICINE

## 2021-08-03 PROCEDURE — 96413 CHEMO IV INFUSION 1 HR: CPT

## 2021-08-03 PROCEDURE — 99999 PR PBB SHADOW E&M-EST. PATIENT-LVL IV: ICD-10-PCS | Mod: PBBFAC,,, | Performed by: INTERNAL MEDICINE

## 2021-08-03 RX ORDER — HEPARIN 100 UNIT/ML
500 SYRINGE INTRAVENOUS
Status: DISCONTINUED | OUTPATIENT
Start: 2021-08-03 | End: 2021-08-03 | Stop reason: HOSPADM

## 2021-08-03 RX ORDER — EPINEPHRINE 0.3 MG/.3ML
0.3 INJECTION SUBCUTANEOUS ONCE AS NEEDED
Status: CANCELLED | OUTPATIENT
Start: 2021-08-03

## 2021-08-03 RX ORDER — METHYLPREDNISOLONE SOD SUCC 125 MG
125 VIAL (EA) INJECTION ONCE AS NEEDED
Status: CANCELLED | OUTPATIENT
Start: 2021-08-03

## 2021-08-03 RX ORDER — SODIUM CHLORIDE 0.9 % (FLUSH) 0.9 %
10 SYRINGE (ML) INJECTION
Status: DISCONTINUED | OUTPATIENT
Start: 2021-08-03 | End: 2021-08-03 | Stop reason: HOSPADM

## 2021-08-03 RX ORDER — SODIUM CHLORIDE 9 MG/ML
1000 INJECTION, SOLUTION INTRAVENOUS
Status: COMPLETED | OUTPATIENT
Start: 2021-08-03 | End: 2021-08-03

## 2021-08-03 RX ORDER — HEPARIN 100 UNIT/ML
500 SYRINGE INTRAVENOUS
Status: CANCELLED | OUTPATIENT
Start: 2021-08-03

## 2021-08-03 RX ORDER — DIPHENHYDRAMINE HYDROCHLORIDE 50 MG/ML
25 INJECTION INTRAMUSCULAR; INTRAVENOUS EVERY 10 MIN PRN
Status: CANCELLED | OUTPATIENT
Start: 2021-08-03 | End: 2021-08-04

## 2021-08-03 RX ORDER — SODIUM CHLORIDE 0.9 % (FLUSH) 0.9 %
10 SYRINGE (ML) INJECTION
Status: CANCELLED | OUTPATIENT
Start: 2021-08-03

## 2021-08-03 RX ADMIN — Medication 10 ML: at 03:08

## 2021-08-03 RX ADMIN — BEVACIZUMAB 1345 MG: 400 INJECTION, SOLUTION INTRAVENOUS at 02:08

## 2021-08-03 RX ADMIN — SODIUM CHLORIDE 1000 ML: 0.9 INJECTION, SOLUTION INTRAVENOUS at 02:08

## 2021-08-03 RX ADMIN — HEPARIN 500 UNITS: 100 SYRINGE at 03:08

## 2021-08-05 ENCOUNTER — SPECIALTY PHARMACY (OUTPATIENT)
Dept: PHARMACY | Facility: CLINIC | Age: 51
End: 2021-08-05

## 2021-08-05 ENCOUNTER — TELEPHONE (OUTPATIENT)
Dept: HEMATOLOGY/ONCOLOGY | Facility: CLINIC | Age: 51
End: 2021-08-05

## 2021-08-05 DIAGNOSIS — Z17.0 MALIGNANT NEOPLASM OF OVERLAPPING SITES OF RIGHT BREAST IN FEMALE, ESTROGEN RECEPTOR POSITIVE: ICD-10-CM

## 2021-08-05 DIAGNOSIS — C50.919 PRIMARY MALIGNANT NEOPLASM OF BREAST WITH METASTASIS TO OTHER SITE, UNSPECIFIED LATERALITY: ICD-10-CM

## 2021-08-05 DIAGNOSIS — C79.51 SECONDARY CANCER OF BONE: ICD-10-CM

## 2021-08-05 DIAGNOSIS — C50.811 MALIGNANT NEOPLASM OF OVERLAPPING SITES OF RIGHT BREAST IN FEMALE, ESTROGEN RECEPTOR POSITIVE: ICD-10-CM

## 2021-08-06 DIAGNOSIS — Z17.0 MALIGNANT NEOPLASM OF OVERLAPPING SITES OF RIGHT BREAST IN FEMALE, ESTROGEN RECEPTOR POSITIVE: ICD-10-CM

## 2021-08-06 DIAGNOSIS — C50.811 MALIGNANT NEOPLASM OF OVERLAPPING SITES OF RIGHT BREAST IN FEMALE, ESTROGEN RECEPTOR POSITIVE: ICD-10-CM

## 2021-08-06 DIAGNOSIS — C77.3 MALIGNANT NEOPLASM METASTATIC TO LYMPH NODE OF AXILLA: ICD-10-CM

## 2021-08-10 ENCOUNTER — TELEPHONE (OUTPATIENT)
Dept: GYNECOLOGIC ONCOLOGY | Facility: CLINIC | Age: 51
End: 2021-08-10

## 2021-08-10 ENCOUNTER — TELEPHONE (OUTPATIENT)
Dept: HEMATOLOGY/ONCOLOGY | Facility: CLINIC | Age: 51
End: 2021-08-10

## 2021-08-13 ENCOUNTER — TELEPHONE (OUTPATIENT)
Dept: HEMATOLOGY/ONCOLOGY | Facility: CLINIC | Age: 51
End: 2021-08-13

## 2021-08-16 ENCOUNTER — PATIENT MESSAGE (OUTPATIENT)
Dept: HEMATOLOGY/ONCOLOGY | Facility: CLINIC | Age: 51
End: 2021-08-16

## 2021-08-16 ENCOUNTER — TELEPHONE (OUTPATIENT)
Dept: HEMATOLOGY/ONCOLOGY | Facility: CLINIC | Age: 51
End: 2021-08-16

## 2021-08-16 ENCOUNTER — HOSPITAL ENCOUNTER (EMERGENCY)
Facility: HOSPITAL | Age: 51
Discharge: HOME | End: 2021-08-16
Attending: EMERGENCY MEDICINE
Payer: MEDICAID

## 2021-08-16 VITALS
WEIGHT: 186.19 LBS | TEMPERATURE: 97 F | DIASTOLIC BLOOD PRESSURE: 82 MMHG | OXYGEN SATURATION: 99 % | BODY MASS INDEX: 29.16 KG/M2 | RESPIRATION RATE: 18 BRPM | SYSTOLIC BLOOD PRESSURE: 112 MMHG | HEART RATE: 90 BPM

## 2021-08-16 DIAGNOSIS — F41.9 ANXIETY: ICD-10-CM

## 2021-08-16 DIAGNOSIS — G89.3 CANCER ASSOCIATED PAIN: ICD-10-CM

## 2021-08-16 DIAGNOSIS — H92.02 OTALGIA OF LEFT EAR: ICD-10-CM

## 2021-08-16 DIAGNOSIS — H66.92 LEFT OTITIS MEDIA, UNSPECIFIED OTITIS MEDIA TYPE: Primary | ICD-10-CM

## 2021-08-16 PROCEDURE — 96372 THER/PROPH/DIAG INJ SC/IM: CPT

## 2021-08-16 PROCEDURE — 25000003 PHARM REV CODE 250: Performed by: REGISTERED NURSE

## 2021-08-16 PROCEDURE — 99284 EMERGENCY DEPT VISIT MOD MDM: CPT | Mod: 25

## 2021-08-16 PROCEDURE — 63600175 PHARM REV CODE 636 W HCPCS: Performed by: REGISTERED NURSE

## 2021-08-16 RX ORDER — HYDROCODONE BITARTRATE AND ACETAMINOPHEN 10; 325 MG/1; MG/1
1 TABLET ORAL EVERY 4 HOURS PRN
Qty: 120 TABLET | Refills: 0 | Status: SHIPPED | OUTPATIENT
Start: 2021-08-23 | End: 2021-09-10 | Stop reason: SDUPTHER

## 2021-08-16 RX ORDER — LIDOCAINE HYDROCHLORIDE 10 MG/ML
2 INJECTION, SOLUTION EPIDURAL; INFILTRATION; INTRACAUDAL; PERINEURAL
Status: COMPLETED | OUTPATIENT
Start: 2021-08-16 | End: 2021-08-16

## 2021-08-16 RX ORDER — HYDROCODONE BITARTRATE AND ACETAMINOPHEN 10; 325 MG/1; MG/1
1 TABLET ORAL
Status: COMPLETED | OUTPATIENT
Start: 2021-08-16 | End: 2021-08-16

## 2021-08-16 RX ORDER — HYDROCODONE BITARTRATE AND ACETAMINOPHEN 10; 325 MG/1; MG/1
1 TABLET ORAL EVERY 6 HOURS PRN
Qty: 18 TABLET | Refills: 0 | Status: SHIPPED | OUTPATIENT
Start: 2021-08-16 | End: 2021-08-16 | Stop reason: SDUPTHER

## 2021-08-16 RX ORDER — ALPRAZOLAM 1 MG/1
.5-1 TABLET ORAL 2 TIMES DAILY PRN
Qty: 60 TABLET | Refills: 0 | Status: SHIPPED | OUTPATIENT
Start: 2021-08-17 | End: 2021-09-17 | Stop reason: SDUPTHER

## 2021-08-16 RX ORDER — MORPHINE SULFATE 15 MG/1
15 TABLET, FILM COATED, EXTENDED RELEASE ORAL EVERY 12 HOURS
Qty: 60 TABLET | Refills: 0 | Status: SHIPPED | OUTPATIENT
Start: 2021-08-23 | End: 2021-09-21 | Stop reason: SDUPTHER

## 2021-08-16 RX ORDER — CEFTRIAXONE 1 G/1
1 INJECTION, POWDER, FOR SOLUTION INTRAMUSCULAR; INTRAVENOUS
Status: COMPLETED | OUTPATIENT
Start: 2021-08-16 | End: 2021-08-16

## 2021-08-16 RX ORDER — CEFDINIR 300 MG/1
300 CAPSULE ORAL 2 TIMES DAILY
Qty: 20 CAPSULE | Refills: 0 | Status: SHIPPED | OUTPATIENT
Start: 2021-08-16 | End: 2021-09-01 | Stop reason: SDUPTHER

## 2021-08-16 RX ADMIN — HYDROCODONE BITARTRATE AND ACETAMINOPHEN 1 TABLET: 10; 325 TABLET ORAL at 03:08

## 2021-08-16 RX ADMIN — CEFTRIAXONE 1 G: 1 INJECTION, POWDER, FOR SOLUTION INTRAMUSCULAR; INTRAVENOUS at 03:08

## 2021-08-16 RX ADMIN — LIDOCAINE HYDROCHLORIDE 20 MG: 10 INJECTION, SOLUTION EPIDURAL; INFILTRATION; INTRACAUDAL at 04:08

## 2021-08-17 ENCOUNTER — APPOINTMENT (OUTPATIENT)
Dept: RADIOLOGY | Facility: HOSPITAL | Age: 51
End: 2021-08-17
Attending: INTERNAL MEDICINE
Payer: MEDICAID

## 2021-08-17 DIAGNOSIS — Z79.811 AROMATASE INHIBITOR USE: ICD-10-CM

## 2021-08-17 PROCEDURE — 77080 DXA BONE DENSITY AXIAL: CPT | Mod: TC

## 2021-08-17 PROCEDURE — 77080 DXA BONE DENSITY AXIAL: CPT | Mod: 26,,, | Performed by: RADIOLOGY

## 2021-08-17 PROCEDURE — 77080 DEXA BONE DENSITY SPINE HIP: ICD-10-PCS | Mod: 26,,, | Performed by: RADIOLOGY

## 2021-08-18 ENCOUNTER — SPECIALTY PHARMACY (OUTPATIENT)
Dept: PHARMACY | Facility: CLINIC | Age: 51
End: 2021-08-18
Payer: MEDICAID

## 2021-08-18 DIAGNOSIS — Z17.0 MALIGNANT NEOPLASM OF OVERLAPPING SITES OF RIGHT BREAST IN FEMALE, ESTROGEN RECEPTOR POSITIVE: ICD-10-CM

## 2021-08-18 DIAGNOSIS — C50.811 MALIGNANT NEOPLASM OF OVERLAPPING SITES OF RIGHT BREAST IN FEMALE, ESTROGEN RECEPTOR POSITIVE: ICD-10-CM

## 2021-08-24 ENCOUNTER — OFFICE VISIT (OUTPATIENT)
Dept: HEMATOLOGY/ONCOLOGY | Facility: CLINIC | Age: 51
End: 2021-08-24
Payer: MEDICAID

## 2021-08-24 ENCOUNTER — INFUSION (OUTPATIENT)
Dept: INFUSION THERAPY | Facility: HOSPITAL | Age: 51
End: 2021-08-24
Attending: INTERNAL MEDICINE
Payer: MEDICAID

## 2021-08-24 VITALS
HEART RATE: 76 BPM | DIASTOLIC BLOOD PRESSURE: 81 MMHG | SYSTOLIC BLOOD PRESSURE: 125 MMHG | RESPIRATION RATE: 16 BRPM | TEMPERATURE: 98 F | OXYGEN SATURATION: 99 %

## 2021-08-24 VITALS
TEMPERATURE: 97 F | OXYGEN SATURATION: 98 % | SYSTOLIC BLOOD PRESSURE: 122 MMHG | HEIGHT: 67 IN | BODY MASS INDEX: 29.9 KG/M2 | DIASTOLIC BLOOD PRESSURE: 76 MMHG | WEIGHT: 190.5 LBS | HEART RATE: 81 BPM

## 2021-08-24 DIAGNOSIS — C79.51 SECONDARY CANCER OF BONE: ICD-10-CM

## 2021-08-24 DIAGNOSIS — C53.0 MALIGNANT NEOPLASM OF ENDOCERVIX: Primary | ICD-10-CM

## 2021-08-24 DIAGNOSIS — Z79.811 AROMATASE INHIBITOR USE: ICD-10-CM

## 2021-08-24 DIAGNOSIS — C50.919 PRIMARY MALIGNANT NEOPLASM OF BREAST WITH METASTASIS TO OTHER SITE, UNSPECIFIED LATERALITY: Primary | ICD-10-CM

## 2021-08-24 DIAGNOSIS — G62.0 DRUG-INDUCED POLYNEUROPATHY: ICD-10-CM

## 2021-08-24 DIAGNOSIS — E03.9 HYPOTHYROIDISM, UNSPECIFIED TYPE: ICD-10-CM

## 2021-08-24 DIAGNOSIS — C53.0 MALIGNANT NEOPLASM OF ENDOCERVIX: ICD-10-CM

## 2021-08-24 PROCEDURE — 99214 OFFICE O/P EST MOD 30 MIN: CPT | Mod: PBBFAC | Performed by: INTERNAL MEDICINE

## 2021-08-24 PROCEDURE — 99999 PR PBB SHADOW E&M-EST. PATIENT-LVL IV: ICD-10-PCS | Mod: PBBFAC,,, | Performed by: INTERNAL MEDICINE

## 2021-08-24 PROCEDURE — 96361 HYDRATE IV INFUSION ADD-ON: CPT

## 2021-08-24 PROCEDURE — 99215 OFFICE O/P EST HI 40 MIN: CPT | Mod: S$PBB,,, | Performed by: INTERNAL MEDICINE

## 2021-08-24 PROCEDURE — 63600175 PHARM REV CODE 636 W HCPCS: Performed by: INTERNAL MEDICINE

## 2021-08-24 PROCEDURE — 99215 PR OFFICE/OUTPT VISIT, EST, LEVL V, 40-54 MIN: ICD-10-PCS | Mod: S$PBB,,, | Performed by: INTERNAL MEDICINE

## 2021-08-24 PROCEDURE — 99999 PR PBB SHADOW E&M-EST. PATIENT-LVL IV: CPT | Mod: PBBFAC,,, | Performed by: INTERNAL MEDICINE

## 2021-08-24 PROCEDURE — 25000003 PHARM REV CODE 250: Performed by: INTERNAL MEDICINE

## 2021-08-24 PROCEDURE — 96413 CHEMO IV INFUSION 1 HR: CPT

## 2021-08-24 RX ORDER — EPINEPHRINE 0.3 MG/.3ML
0.3 INJECTION SUBCUTANEOUS ONCE AS NEEDED
Status: CANCELLED | OUTPATIENT
Start: 2021-08-24

## 2021-08-24 RX ORDER — GABAPENTIN 100 MG/1
100 CAPSULE ORAL NIGHTLY
Qty: 30 CAPSULE | Refills: 11 | Status: SHIPPED | OUTPATIENT
Start: 2021-08-24 | End: 2021-10-05 | Stop reason: SDUPTHER

## 2021-08-24 RX ORDER — DIPHENHYDRAMINE HYDROCHLORIDE 50 MG/ML
25 INJECTION INTRAMUSCULAR; INTRAVENOUS EVERY 10 MIN PRN
Status: CANCELLED | OUTPATIENT
Start: 2021-08-24 | End: 2021-08-25

## 2021-08-24 RX ORDER — SODIUM CHLORIDE 0.9 % (FLUSH) 0.9 %
10 SYRINGE (ML) INJECTION
Status: CANCELLED | OUTPATIENT
Start: 2021-08-24

## 2021-08-24 RX ORDER — HEPARIN 100 UNIT/ML
500 SYRINGE INTRAVENOUS
Status: DISCONTINUED | OUTPATIENT
Start: 2021-08-24 | End: 2021-08-24 | Stop reason: HOSPADM

## 2021-08-24 RX ORDER — METHYLPREDNISOLONE SOD SUCC 125 MG
125 VIAL (EA) INJECTION ONCE AS NEEDED
Status: CANCELLED | OUTPATIENT
Start: 2021-08-24

## 2021-08-24 RX ORDER — HEPARIN 100 UNIT/ML
500 SYRINGE INTRAVENOUS
Status: CANCELLED | OUTPATIENT
Start: 2021-08-24

## 2021-08-24 RX ADMIN — HEPARIN 500 UNITS: 100 SYRINGE at 03:08

## 2021-08-24 RX ADMIN — BEVACIZUMAB 1300 MG: 400 INJECTION, SOLUTION INTRAVENOUS at 02:08

## 2021-08-24 RX ADMIN — SODIUM CHLORIDE 1000 ML: 0.9 INJECTION, SOLUTION INTRAVENOUS at 02:08

## 2021-09-01 ENCOUNTER — OFFICE VISIT (OUTPATIENT)
Dept: INTERNAL MEDICINE | Facility: CLINIC | Age: 51
End: 2021-09-01
Payer: MEDICAID

## 2021-09-01 DIAGNOSIS — C73 PAPILLARY THYROID CARCINOMA: ICD-10-CM

## 2021-09-01 DIAGNOSIS — H66.005 RECURRENT ACUTE SUPPURATIVE OTITIS MEDIA WITHOUT SPONTANEOUS RUPTURE OF LEFT TYMPANIC MEMBRANE: Primary | ICD-10-CM

## 2021-09-01 DIAGNOSIS — C77.3 MALIGNANT NEOPLASM METASTATIC TO LYMPH NODE OF AXILLA: ICD-10-CM

## 2021-09-01 PROBLEM — H66.007 RECURRENT ACUTE SUPPURATIVE OTITIS MEDIA: Status: ACTIVE | Noted: 2021-09-01

## 2021-09-01 PROCEDURE — 99213 OFFICE O/P EST LOW 20 MIN: CPT | Mod: 95,,, | Performed by: FAMILY MEDICINE

## 2021-09-01 PROCEDURE — 99213 PR OFFICE/OUTPT VISIT, EST, LEVL III, 20-29 MIN: ICD-10-PCS | Mod: 95,,, | Performed by: FAMILY MEDICINE

## 2021-09-01 RX ORDER — CEFDINIR 300 MG/1
300 CAPSULE ORAL 2 TIMES DAILY
Qty: 20 CAPSULE | Refills: 0 | Status: SHIPPED | OUTPATIENT
Start: 2021-09-01 | End: 2021-09-12

## 2021-09-02 ENCOUNTER — TELEPHONE (OUTPATIENT)
Dept: PULMONOLOGY | Facility: CLINIC | Age: 51
End: 2021-09-02

## 2021-09-03 ENCOUNTER — PATIENT MESSAGE (OUTPATIENT)
Dept: INTERNAL MEDICINE | Facility: CLINIC | Age: 51
End: 2021-09-03

## 2021-09-06 ENCOUNTER — PATIENT MESSAGE (OUTPATIENT)
Dept: PHARMACY | Facility: CLINIC | Age: 51
End: 2021-09-06

## 2021-09-07 ENCOUNTER — SPECIALTY PHARMACY (OUTPATIENT)
Dept: PHARMACY | Facility: CLINIC | Age: 51
End: 2021-09-07

## 2021-09-07 DIAGNOSIS — C50.811 MALIGNANT NEOPLASM OF OVERLAPPING SITES OF RIGHT BREAST IN FEMALE, ESTROGEN RECEPTOR POSITIVE: ICD-10-CM

## 2021-09-07 DIAGNOSIS — Z17.0 MALIGNANT NEOPLASM OF OVERLAPPING SITES OF RIGHT BREAST IN FEMALE, ESTROGEN RECEPTOR POSITIVE: ICD-10-CM

## 2021-09-10 DIAGNOSIS — G89.3 CANCER ASSOCIATED PAIN: ICD-10-CM

## 2021-09-13 ENCOUNTER — TELEPHONE (OUTPATIENT)
Dept: GYNECOLOGIC ONCOLOGY | Facility: CLINIC | Age: 51
End: 2021-09-13

## 2021-09-13 ENCOUNTER — PATIENT MESSAGE (OUTPATIENT)
Dept: GYNECOLOGIC ONCOLOGY | Facility: CLINIC | Age: 51
End: 2021-09-13

## 2021-09-13 RX ORDER — HYDROCODONE BITARTRATE AND ACETAMINOPHEN 10; 325 MG/1; MG/1
1 TABLET ORAL EVERY 4 HOURS PRN
Qty: 120 TABLET | Refills: 0 | Status: SHIPPED | OUTPATIENT
Start: 2021-09-13 | End: 2021-10-04 | Stop reason: SDUPTHER

## 2021-09-14 ENCOUNTER — OFFICE VISIT (OUTPATIENT)
Dept: HEMATOLOGY/ONCOLOGY | Facility: CLINIC | Age: 51
End: 2021-09-14
Payer: MEDICAID

## 2021-09-14 ENCOUNTER — OFFICE VISIT (OUTPATIENT)
Dept: OTOLARYNGOLOGY | Facility: CLINIC | Age: 51
End: 2021-09-14
Payer: MEDICAID

## 2021-09-14 ENCOUNTER — INFUSION (OUTPATIENT)
Dept: INFUSION THERAPY | Facility: HOSPITAL | Age: 51
End: 2021-09-14
Attending: INTERNAL MEDICINE
Payer: MEDICAID

## 2021-09-14 VITALS
HEART RATE: 78 BPM | SYSTOLIC BLOOD PRESSURE: 134 MMHG | OXYGEN SATURATION: 99 % | DIASTOLIC BLOOD PRESSURE: 82 MMHG | RESPIRATION RATE: 18 BRPM | TEMPERATURE: 98 F

## 2021-09-14 VITALS — HEIGHT: 67 IN | BODY MASS INDEX: 29.52 KG/M2 | WEIGHT: 188.06 LBS

## 2021-09-14 DIAGNOSIS — C50.811 MALIGNANT NEOPLASM OF OVERLAPPING SITES OF RIGHT BREAST IN FEMALE, ESTROGEN RECEPTOR POSITIVE: ICD-10-CM

## 2021-09-14 DIAGNOSIS — C53.0 MALIGNANT NEOPLASM OF ENDOCERVIX: Primary | ICD-10-CM

## 2021-09-14 DIAGNOSIS — H60.392 OTHER INFECTIVE CHRONIC OTITIS EXTERNA OF LEFT EAR: ICD-10-CM

## 2021-09-14 DIAGNOSIS — H92.03 OTALGIA OF BOTH EARS: Primary | ICD-10-CM

## 2021-09-14 DIAGNOSIS — Z79.811 AROMATASE INHIBITOR USE: ICD-10-CM

## 2021-09-14 DIAGNOSIS — C50.919 PRIMARY MALIGNANT NEOPLASM OF BREAST WITH METASTASIS TO OTHER SITE, UNSPECIFIED LATERALITY: Primary | ICD-10-CM

## 2021-09-14 DIAGNOSIS — C79.51 SECONDARY CANCER OF BONE: ICD-10-CM

## 2021-09-14 DIAGNOSIS — C53.0 MALIGNANT NEOPLASM OF ENDOCERVIX: ICD-10-CM

## 2021-09-14 DIAGNOSIS — Z17.0 MALIGNANT NEOPLASM OF OVERLAPPING SITES OF RIGHT BREAST IN FEMALE, ESTROGEN RECEPTOR POSITIVE: ICD-10-CM

## 2021-09-14 DIAGNOSIS — H61.23 BILATERAL IMPACTED CERUMEN: ICD-10-CM

## 2021-09-14 DIAGNOSIS — H60.42 CHOLESTEATOMA OF LEFT EXTERNAL AUDITORY CANAL: ICD-10-CM

## 2021-09-14 PROCEDURE — 63600175 PHARM REV CODE 636 W HCPCS: Mod: JG | Performed by: INTERNAL MEDICINE

## 2021-09-14 PROCEDURE — 99999 PR PBB SHADOW E&M-EST. PATIENT-LVL V: ICD-10-PCS | Mod: PBBFAC,,, | Performed by: OTOLARYNGOLOGY

## 2021-09-14 PROCEDURE — 69210 REMOVE IMPACTED EAR WAX UNI: CPT | Mod: 50,PBBFAC | Performed by: OTOLARYNGOLOGY

## 2021-09-14 PROCEDURE — 25000003 PHARM REV CODE 250: Performed by: INTERNAL MEDICINE

## 2021-09-14 PROCEDURE — 99214 PR OFFICE/OUTPT VISIT, EST, LEVL IV, 30-39 MIN: ICD-10-PCS | Mod: 25,S$PBB,, | Performed by: OTOLARYNGOLOGY

## 2021-09-14 PROCEDURE — 69210 REMOVE IMPACTED EAR WAX UNI: CPT | Mod: S$PBB,,, | Performed by: OTOLARYNGOLOGY

## 2021-09-14 PROCEDURE — 99214 OFFICE O/P EST MOD 30 MIN: CPT | Mod: 25,S$PBB,, | Performed by: OTOLARYNGOLOGY

## 2021-09-14 PROCEDURE — 99215 OFFICE O/P EST HI 40 MIN: CPT | Mod: PBBFAC,25 | Performed by: OTOLARYNGOLOGY

## 2021-09-14 PROCEDURE — 99215 PR OFFICE/OUTPT VISIT, EST, LEVL V, 40-54 MIN: ICD-10-PCS | Mod: 95,,, | Performed by: INTERNAL MEDICINE

## 2021-09-14 PROCEDURE — 96367 TX/PROPH/DG ADDL SEQ IV INF: CPT

## 2021-09-14 PROCEDURE — 69210 PR REMOVAL IMPACTED CERUMEN REQUIRING INSTRUMENTATION, UNILATERAL: ICD-10-PCS | Mod: S$PBB,,, | Performed by: OTOLARYNGOLOGY

## 2021-09-14 PROCEDURE — 99215 OFFICE O/P EST HI 40 MIN: CPT | Mod: 95,,, | Performed by: INTERNAL MEDICINE

## 2021-09-14 PROCEDURE — 99999 PR PBB SHADOW E&M-EST. PATIENT-LVL V: CPT | Mod: PBBFAC,,, | Performed by: OTOLARYNGOLOGY

## 2021-09-14 PROCEDURE — 96413 CHEMO IV INFUSION 1 HR: CPT

## 2021-09-14 RX ORDER — DIPHENHYDRAMINE HYDROCHLORIDE 50 MG/ML
25 INJECTION INTRAMUSCULAR; INTRAVENOUS EVERY 10 MIN PRN
Status: CANCELLED | OUTPATIENT
Start: 2021-09-14 | End: 2021-09-15

## 2021-09-14 RX ORDER — SODIUM CHLORIDE 0.9 % (FLUSH) 0.9 %
10 SYRINGE (ML) INJECTION
Status: CANCELLED | OUTPATIENT
Start: 2021-09-14

## 2021-09-14 RX ORDER — HEPARIN 100 UNIT/ML
500 SYRINGE INTRAVENOUS
Status: CANCELLED | OUTPATIENT
Start: 2021-09-14

## 2021-09-14 RX ORDER — CIPROFLOXACIN AND DEXAMETHASONE 3; 1 MG/ML; MG/ML
4 SUSPENSION/ DROPS AURICULAR (OTIC) 2 TIMES DAILY
Qty: 7.5 ML | Refills: 1 | Status: SHIPPED | OUTPATIENT
Start: 2021-09-14 | End: 2021-12-09

## 2021-09-14 RX ORDER — HEPARIN 100 UNIT/ML
500 SYRINGE INTRAVENOUS
Status: DISCONTINUED | OUTPATIENT
Start: 2021-09-14 | End: 2021-09-14 | Stop reason: HOSPADM

## 2021-09-14 RX ORDER — CIPROFLOXACIN AND DEXAMETHASONE 3; 1 MG/ML; MG/ML
4 SUSPENSION/ DROPS AURICULAR (OTIC) 2 TIMES DAILY
Qty: 7.5 ML | Refills: 1 | Status: SHIPPED | OUTPATIENT
Start: 2021-09-14 | End: 2021-09-14

## 2021-09-14 RX ORDER — EPINEPHRINE 0.3 MG/.3ML
0.3 INJECTION SUBCUTANEOUS ONCE AS NEEDED
Status: CANCELLED | OUTPATIENT
Start: 2021-09-14

## 2021-09-14 RX ORDER — ZOLEDRONIC ACID 0.04 MG/ML
4 INJECTION, SOLUTION INTRAVENOUS
Status: COMPLETED | OUTPATIENT
Start: 2021-09-14 | End: 2021-09-14

## 2021-09-14 RX ORDER — METHYLPREDNISOLONE SOD SUCC 125 MG
125 VIAL (EA) INJECTION ONCE AS NEEDED
Status: CANCELLED | OUTPATIENT
Start: 2021-09-14

## 2021-09-14 RX ADMIN — BEVACIZUMAB 1300 MG: 400 INJECTION, SOLUTION INTRAVENOUS at 02:09

## 2021-09-14 RX ADMIN — ZOLEDRONIC ACID 4 MG: 0.04 INJECTION, SOLUTION INTRAVENOUS at 03:09

## 2021-09-14 RX ADMIN — HEPARIN 500 UNITS: 100 SYRINGE at 03:09

## 2021-09-17 DIAGNOSIS — F41.9 ANXIETY: ICD-10-CM

## 2021-09-20 RX ORDER — ALPRAZOLAM 1 MG/1
.5-1 TABLET ORAL 2 TIMES DAILY PRN
Qty: 60 TABLET | Refills: 0 | Status: SHIPPED | OUTPATIENT
Start: 2021-09-20 | End: 2021-10-21 | Stop reason: SDUPTHER

## 2021-09-21 DIAGNOSIS — G89.3 CANCER ASSOCIATED PAIN: ICD-10-CM

## 2021-09-21 RX ORDER — MORPHINE SULFATE 15 MG/1
15 TABLET, FILM COATED, EXTENDED RELEASE ORAL EVERY 12 HOURS
Qty: 60 TABLET | Refills: 0 | Status: SHIPPED | OUTPATIENT
Start: 2021-09-21 | End: 2021-10-21 | Stop reason: SDUPTHER

## 2021-09-28 ENCOUNTER — OFFICE VISIT (OUTPATIENT)
Dept: OTOLARYNGOLOGY | Facility: CLINIC | Age: 51
End: 2021-09-28
Payer: MEDICAID

## 2021-09-28 VITALS — BODY MASS INDEX: 29.97 KG/M2 | WEIGHT: 190.94 LBS | HEIGHT: 67 IN

## 2021-09-28 DIAGNOSIS — H60.42 CHOLESTEATOMA OF LEFT EXTERNAL AUDITORY CANAL: ICD-10-CM

## 2021-09-28 DIAGNOSIS — H60.392 OTHER INFECTIVE CHRONIC OTITIS EXTERNA OF LEFT EAR: Primary | ICD-10-CM

## 2021-09-28 DIAGNOSIS — H92.03 OTALGIA OF BOTH EARS: ICD-10-CM

## 2021-09-28 PROCEDURE — 99213 OFFICE O/P EST LOW 20 MIN: CPT | Mod: 25,S$PBB,, | Performed by: OTOLARYNGOLOGY

## 2021-09-28 PROCEDURE — 69210 REMOVE IMPACTED EAR WAX UNI: CPT | Mod: S$PBB,,, | Performed by: OTOLARYNGOLOGY

## 2021-09-28 PROCEDURE — 99999 PR PBB SHADOW E&M-EST. PATIENT-LVL III: CPT | Mod: PBBFAC,,, | Performed by: OTOLARYNGOLOGY

## 2021-09-28 PROCEDURE — 99213 OFFICE O/P EST LOW 20 MIN: CPT | Mod: PBBFAC | Performed by: OTOLARYNGOLOGY

## 2021-09-28 PROCEDURE — 99213 PR OFFICE/OUTPT VISIT, EST, LEVL III, 20-29 MIN: ICD-10-PCS | Mod: 25,S$PBB,, | Performed by: OTOLARYNGOLOGY

## 2021-09-28 PROCEDURE — 99999 PR PBB SHADOW E&M-EST. PATIENT-LVL III: ICD-10-PCS | Mod: PBBFAC,,, | Performed by: OTOLARYNGOLOGY

## 2021-09-28 PROCEDURE — 69210 PR REMOVAL IMPACTED CERUMEN REQUIRING INSTRUMENTATION, UNILATERAL: ICD-10-PCS | Mod: S$PBB,,, | Performed by: OTOLARYNGOLOGY

## 2021-09-28 PROCEDURE — 69210 REMOVE IMPACTED EAR WAX UNI: CPT | Mod: PBBFAC | Performed by: OTOLARYNGOLOGY

## 2021-09-30 ENCOUNTER — SPECIALTY PHARMACY (OUTPATIENT)
Dept: PHARMACY | Facility: CLINIC | Age: 51
End: 2021-09-30
Payer: MEDICAID

## 2021-09-30 DIAGNOSIS — Z17.0 MALIGNANT NEOPLASM OF OVERLAPPING SITES OF RIGHT BREAST IN FEMALE, ESTROGEN RECEPTOR POSITIVE: ICD-10-CM

## 2021-09-30 DIAGNOSIS — C50.811 MALIGNANT NEOPLASM OF OVERLAPPING SITES OF RIGHT BREAST IN FEMALE, ESTROGEN RECEPTOR POSITIVE: ICD-10-CM

## 2021-10-01 ENCOUNTER — TELEPHONE (OUTPATIENT)
Dept: HEMATOLOGY/ONCOLOGY | Facility: CLINIC | Age: 51
End: 2021-10-01

## 2021-10-01 DIAGNOSIS — J44.89 COPD WITH ASTHMA: Primary | ICD-10-CM

## 2021-10-01 DIAGNOSIS — G89.3 CANCER ASSOCIATED PAIN: ICD-10-CM

## 2021-10-01 RX ORDER — HYDROCODONE BITARTRATE AND ACETAMINOPHEN 10; 325 MG/1; MG/1
1 TABLET ORAL EVERY 4 HOURS PRN
Qty: 120 TABLET | Refills: 0 | OUTPATIENT
Start: 2021-10-01

## 2021-10-02 ENCOUNTER — PATIENT MESSAGE (OUTPATIENT)
Dept: PALLIATIVE MEDICINE | Facility: CLINIC | Age: 51
End: 2021-10-02

## 2021-10-04 ENCOUNTER — PATIENT MESSAGE (OUTPATIENT)
Dept: OTOLARYNGOLOGY | Facility: CLINIC | Age: 51
End: 2021-10-04

## 2021-10-04 DIAGNOSIS — G89.3 CANCER ASSOCIATED PAIN: ICD-10-CM

## 2021-10-04 DIAGNOSIS — D33.3 BENIGN NEOPLASM OF CRANIAL NERVES: ICD-10-CM

## 2021-10-04 DIAGNOSIS — H60.42 CHOLESTEATOMA OF LEFT EXTERNAL AUDITORY CANAL: Primary | ICD-10-CM

## 2021-10-04 RX ORDER — HYDROCODONE BITARTRATE AND ACETAMINOPHEN 10; 325 MG/1; MG/1
1 TABLET ORAL EVERY 4 HOURS PRN
Qty: 120 TABLET | Refills: 0 | Status: SHIPPED | OUTPATIENT
Start: 2021-10-04 | End: 2021-10-21 | Stop reason: SDUPTHER

## 2021-10-04 RX ORDER — HYDROCODONE BITARTRATE AND ACETAMINOPHEN 10; 325 MG/1; MG/1
1 TABLET ORAL EVERY 4 HOURS PRN
Qty: 120 TABLET | Refills: 0 | Status: CANCELLED | OUTPATIENT
Start: 2021-10-04

## 2021-10-04 RX ORDER — HYDROCODONE BITARTRATE AND ACETAMINOPHEN 10; 325 MG/1; MG/1
1 TABLET ORAL EVERY 4 HOURS PRN
Qty: 120 TABLET | Refills: 0 | OUTPATIENT
Start: 2021-10-04

## 2021-10-05 ENCOUNTER — INFUSION (OUTPATIENT)
Dept: INFUSION THERAPY | Facility: HOSPITAL | Age: 51
End: 2021-10-05
Attending: INTERNAL MEDICINE
Payer: MEDICAID

## 2021-10-05 ENCOUNTER — TELEPHONE (OUTPATIENT)
Dept: INFUSION THERAPY | Facility: HOSPITAL | Age: 51
End: 2021-10-05

## 2021-10-05 ENCOUNTER — HOSPITAL ENCOUNTER (OUTPATIENT)
Dept: RADIOLOGY | Facility: HOSPITAL | Age: 51
Discharge: HOME | End: 2021-10-05
Attending: INTERNAL MEDICINE
Payer: MEDICAID

## 2021-10-05 ENCOUNTER — OFFICE VISIT (OUTPATIENT)
Dept: PULMONOLOGY | Facility: CLINIC | Age: 51
End: 2021-10-05
Payer: MEDICAID

## 2021-10-05 ENCOUNTER — OFFICE VISIT (OUTPATIENT)
Dept: HEMATOLOGY/ONCOLOGY | Facility: CLINIC | Age: 51
End: 2021-10-05
Payer: MEDICAID

## 2021-10-05 VITALS
HEIGHT: 67 IN | WEIGHT: 192.25 LBS | RESPIRATION RATE: 16 BRPM | HEART RATE: 70 BPM | BODY MASS INDEX: 30.17 KG/M2 | SYSTOLIC BLOOD PRESSURE: 129 MMHG | OXYGEN SATURATION: 99 % | TEMPERATURE: 97 F | DIASTOLIC BLOOD PRESSURE: 74 MMHG

## 2021-10-05 VITALS
HEART RATE: 90 BPM | SYSTOLIC BLOOD PRESSURE: 124 MMHG | OXYGEN SATURATION: 98 % | DIASTOLIC BLOOD PRESSURE: 88 MMHG | WEIGHT: 195.31 LBS | BODY MASS INDEX: 30.59 KG/M2

## 2021-10-05 VITALS
TEMPERATURE: 97 F | OXYGEN SATURATION: 98 % | HEIGHT: 67 IN | WEIGHT: 192.25 LBS | DIASTOLIC BLOOD PRESSURE: 86 MMHG | BODY MASS INDEX: 30.17 KG/M2 | HEART RATE: 86 BPM | SYSTOLIC BLOOD PRESSURE: 130 MMHG

## 2021-10-05 DIAGNOSIS — C50.919 PRIMARY MALIGNANT NEOPLASM OF BREAST WITH METASTASIS TO OTHER SITE, UNSPECIFIED LATERALITY: Primary | ICD-10-CM

## 2021-10-05 DIAGNOSIS — J44.89 ASTHMA WITH COPD: Primary | ICD-10-CM

## 2021-10-05 DIAGNOSIS — C53.0 MALIGNANT NEOPLASM OF ENDOCERVIX: Primary | ICD-10-CM

## 2021-10-05 DIAGNOSIS — C79.51 SECONDARY CANCER OF BONE: ICD-10-CM

## 2021-10-05 DIAGNOSIS — J44.89 COPD WITH ASTHMA: ICD-10-CM

## 2021-10-05 DIAGNOSIS — M54.12 RADICULOPATHY, CERVICAL REGION: ICD-10-CM

## 2021-10-05 DIAGNOSIS — C50.811 MALIGNANT NEOPLASM OF OVERLAPPING SITES OF RIGHT BREAST IN FEMALE, ESTROGEN RECEPTOR POSITIVE: Primary | ICD-10-CM

## 2021-10-05 DIAGNOSIS — Z79.811 AROMATASE INHIBITOR USE: ICD-10-CM

## 2021-10-05 DIAGNOSIS — J45.31 MILD PERSISTENT ASTHMA WITH ACUTE EXACERBATION: ICD-10-CM

## 2021-10-05 DIAGNOSIS — G62.0 PERIPHERAL NEUROPATHY DUE TO CHEMOTHERAPY: ICD-10-CM

## 2021-10-05 DIAGNOSIS — H66.90 OTITIS MEDIA, UNSPECIFIED LATERALITY, UNSPECIFIED OTITIS MEDIA TYPE: ICD-10-CM

## 2021-10-05 DIAGNOSIS — T45.1X5A PERIPHERAL NEUROPATHY DUE TO CHEMOTHERAPY: ICD-10-CM

## 2021-10-05 DIAGNOSIS — C77.3 MALIGNANT NEOPLASM METASTATIC TO LYMPH NODE OF AXILLA: ICD-10-CM

## 2021-10-05 DIAGNOSIS — R09.02 EXERCISE HYPOXEMIA: ICD-10-CM

## 2021-10-05 DIAGNOSIS — G62.0 DRUG-INDUCED POLYNEUROPATHY: ICD-10-CM

## 2021-10-05 DIAGNOSIS — C53.0 MALIGNANT NEOPLASM OF ENDOCERVIX: ICD-10-CM

## 2021-10-05 DIAGNOSIS — Z17.0 MALIGNANT NEOPLASM OF OVERLAPPING SITES OF RIGHT BREAST IN FEMALE, ESTROGEN RECEPTOR POSITIVE: Primary | ICD-10-CM

## 2021-10-05 PROCEDURE — 25000003 PHARM REV CODE 250: Performed by: INTERNAL MEDICINE

## 2021-10-05 PROCEDURE — 99214 PR OFFICE/OUTPT VISIT, EST, LEVL IV, 30-39 MIN: ICD-10-PCS | Mod: S$PBB,,, | Performed by: INTERNAL MEDICINE

## 2021-10-05 PROCEDURE — 99999 PR PBB SHADOW E&M-EST. PATIENT-LVL IV: CPT | Mod: PBBFAC,,, | Performed by: INTERNAL MEDICINE

## 2021-10-05 PROCEDURE — 99215 PR OFFICE/OUTPT VISIT, EST, LEVL V, 40-54 MIN: ICD-10-PCS | Mod: S$PBB,,, | Performed by: INTERNAL MEDICINE

## 2021-10-05 PROCEDURE — 71046 X-RAY EXAM CHEST 2 VIEWS: CPT | Mod: 26,,, | Performed by: RADIOLOGY

## 2021-10-05 PROCEDURE — 99214 OFFICE O/P EST MOD 30 MIN: CPT | Mod: PBBFAC,25 | Performed by: INTERNAL MEDICINE

## 2021-10-05 PROCEDURE — 71046 X-RAY EXAM CHEST 2 VIEWS: CPT | Mod: TC

## 2021-10-05 PROCEDURE — 71046 XR CHEST PA AND LATERAL: ICD-10-PCS | Mod: 26,,, | Performed by: RADIOLOGY

## 2021-10-05 PROCEDURE — 99215 OFFICE O/P EST HI 40 MIN: CPT | Mod: S$PBB,,, | Performed by: INTERNAL MEDICINE

## 2021-10-05 PROCEDURE — 99999 PR PBB SHADOW E&M-EST. PATIENT-LVL IV: ICD-10-PCS | Mod: PBBFAC,,, | Performed by: INTERNAL MEDICINE

## 2021-10-05 PROCEDURE — 63600175 PHARM REV CODE 636 W HCPCS: Performed by: INTERNAL MEDICINE

## 2021-10-05 PROCEDURE — 99214 OFFICE O/P EST MOD 30 MIN: CPT | Mod: PBBFAC,25,27 | Performed by: INTERNAL MEDICINE

## 2021-10-05 PROCEDURE — 96413 CHEMO IV INFUSION 1 HR: CPT

## 2021-10-05 PROCEDURE — 99214 OFFICE O/P EST MOD 30 MIN: CPT | Mod: S$PBB,,, | Performed by: INTERNAL MEDICINE

## 2021-10-05 PROCEDURE — 96361 HYDRATE IV INFUSION ADD-ON: CPT

## 2021-10-05 RX ORDER — PREDNISONE 20 MG/1
TABLET ORAL
Qty: 20 TABLET | Refills: 0 | Status: SHIPPED | OUTPATIENT
Start: 2021-10-05 | End: 2022-01-20 | Stop reason: ALTCHOICE

## 2021-10-05 RX ORDER — GABAPENTIN 100 MG/1
100 CAPSULE ORAL NIGHTLY
Qty: 30 CAPSULE | Refills: 11 | Status: SHIPPED | OUTPATIENT
Start: 2021-10-05 | End: 2021-10-26

## 2021-10-05 RX ORDER — EPINEPHRINE 0.3 MG/.3ML
0.3 INJECTION SUBCUTANEOUS ONCE AS NEEDED
Status: CANCELLED | OUTPATIENT
Start: 2021-10-05

## 2021-10-05 RX ORDER — LEVOFLOXACIN 500 MG/1
500 TABLET, FILM COATED ORAL DAILY
Qty: 10 TABLET | Refills: 0 | Status: SHIPPED | OUTPATIENT
Start: 2021-10-05 | End: 2022-01-20 | Stop reason: ALTCHOICE

## 2021-10-05 RX ORDER — ALBUTEROL SULFATE 0.83 MG/ML
2.5 SOLUTION RESPIRATORY (INHALATION)
Qty: 360 ML | Refills: 11 | Status: SHIPPED | OUTPATIENT
Start: 2021-10-05 | End: 2022-10-05

## 2021-10-05 RX ORDER — ALBUTEROL SULFATE 90 UG/1
2 AEROSOL, METERED RESPIRATORY (INHALATION) EVERY 4 HOURS PRN
Qty: 18 G | Refills: 11 | Status: SHIPPED | OUTPATIENT
Start: 2021-10-05

## 2021-10-05 RX ORDER — SODIUM CHLORIDE 0.9 % (FLUSH) 0.9 %
10 SYRINGE (ML) INJECTION
Status: CANCELLED | OUTPATIENT
Start: 2021-10-05

## 2021-10-05 RX ORDER — HEPARIN 100 UNIT/ML
500 SYRINGE INTRAVENOUS
Status: CANCELLED | OUTPATIENT
Start: 2021-10-05

## 2021-10-05 RX ORDER — SODIUM CHLORIDE 9 MG/ML
1000 INJECTION, SOLUTION INTRAVENOUS
Status: COMPLETED | OUTPATIENT
Start: 2021-10-05 | End: 2021-10-05

## 2021-10-05 RX ORDER — SODIUM CHLORIDE 0.9 % (FLUSH) 0.9 %
10 SYRINGE (ML) INJECTION
Status: DISCONTINUED | OUTPATIENT
Start: 2021-10-05 | End: 2021-10-05 | Stop reason: HOSPADM

## 2021-10-05 RX ORDER — METHYLPREDNISOLONE SOD SUCC 125 MG
125 VIAL (EA) INJECTION ONCE AS NEEDED
Status: CANCELLED | OUTPATIENT
Start: 2021-10-05

## 2021-10-05 RX ORDER — HEPARIN 100 UNIT/ML
500 SYRINGE INTRAVENOUS
Status: DISCONTINUED | OUTPATIENT
Start: 2021-10-05 | End: 2021-10-05 | Stop reason: HOSPADM

## 2021-10-05 RX ORDER — DIPHENHYDRAMINE HYDROCHLORIDE 50 MG/ML
25 INJECTION INTRAMUSCULAR; INTRAVENOUS EVERY 10 MIN PRN
Status: CANCELLED | OUTPATIENT
Start: 2021-10-05 | End: 2021-10-06

## 2021-10-05 RX ADMIN — BEVACIZUMAB 1345 MG: 400 INJECTION, SOLUTION INTRAVENOUS at 11:10

## 2021-10-05 RX ADMIN — SODIUM CHLORIDE 1000 ML: 0.9 INJECTION, SOLUTION INTRAVENOUS at 10:10

## 2021-10-05 RX ADMIN — HEPARIN 500 UNITS: 100 SYRINGE at 12:10

## 2021-10-12 ENCOUNTER — OFFICE VISIT (OUTPATIENT)
Dept: OTOLARYNGOLOGY | Facility: CLINIC | Age: 51
End: 2021-10-12
Payer: MEDICAID

## 2021-10-12 ENCOUNTER — SPECIALTY PHARMACY (OUTPATIENT)
Dept: PHARMACY | Facility: CLINIC | Age: 51
End: 2021-10-12

## 2021-10-12 ENCOUNTER — TELEPHONE (OUTPATIENT)
Dept: OTOLARYNGOLOGY | Facility: CLINIC | Age: 51
End: 2021-10-12

## 2021-10-12 VITALS — WEIGHT: 192.25 LBS | HEIGHT: 67 IN | BODY MASS INDEX: 30.17 KG/M2

## 2021-10-12 DIAGNOSIS — H92.03 OTALGIA OF BOTH EARS: ICD-10-CM

## 2021-10-12 DIAGNOSIS — Z17.0 MALIGNANT NEOPLASM OF OVERLAPPING SITES OF RIGHT BREAST IN FEMALE, ESTROGEN RECEPTOR POSITIVE: ICD-10-CM

## 2021-10-12 DIAGNOSIS — H60.42 CHOLESTEATOMA OF LEFT EXTERNAL AUDITORY CANAL: Primary | ICD-10-CM

## 2021-10-12 DIAGNOSIS — C50.811 MALIGNANT NEOPLASM OF OVERLAPPING SITES OF RIGHT BREAST IN FEMALE, ESTROGEN RECEPTOR POSITIVE: ICD-10-CM

## 2021-10-12 DIAGNOSIS — H60.392 OTHER INFECTIVE CHRONIC OTITIS EXTERNA OF LEFT EAR: ICD-10-CM

## 2021-10-12 PROCEDURE — 99999 PR PBB SHADOW E&M-EST. PATIENT-LVL IV: ICD-10-PCS | Mod: PBBFAC,,, | Performed by: OTOLARYNGOLOGY

## 2021-10-12 PROCEDURE — 69210 PR REMOVAL IMPACTED CERUMEN REQUIRING INSTRUMENTATION, UNILATERAL: ICD-10-PCS | Mod: S$PBB,,, | Performed by: OTOLARYNGOLOGY

## 2021-10-12 PROCEDURE — 69210 REMOVE IMPACTED EAR WAX UNI: CPT | Mod: PBBFAC | Performed by: OTOLARYNGOLOGY

## 2021-10-12 PROCEDURE — 99213 PR OFFICE/OUTPT VISIT, EST, LEVL III, 20-29 MIN: ICD-10-PCS | Mod: 25,S$PBB,, | Performed by: OTOLARYNGOLOGY

## 2021-10-12 PROCEDURE — 99214 OFFICE O/P EST MOD 30 MIN: CPT | Mod: PBBFAC | Performed by: OTOLARYNGOLOGY

## 2021-10-12 PROCEDURE — 69210 REMOVE IMPACTED EAR WAX UNI: CPT | Mod: S$PBB,,, | Performed by: OTOLARYNGOLOGY

## 2021-10-12 PROCEDURE — 99213 OFFICE O/P EST LOW 20 MIN: CPT | Mod: 25,S$PBB,, | Performed by: OTOLARYNGOLOGY

## 2021-10-12 PROCEDURE — 99999 PR PBB SHADOW E&M-EST. PATIENT-LVL IV: CPT | Mod: PBBFAC,,, | Performed by: OTOLARYNGOLOGY

## 2021-10-15 ENCOUNTER — HOSPITAL ENCOUNTER (OUTPATIENT)
Dept: RADIOLOGY | Facility: HOSPITAL | Age: 51
Discharge: HOME | End: 2021-10-15
Attending: OTOLARYNGOLOGY
Payer: MEDICAID

## 2021-10-15 DIAGNOSIS — H60.42 CHOLESTEATOMA OF LEFT EXTERNAL AUDITORY CANAL: ICD-10-CM

## 2021-10-15 PROCEDURE — 70480 CT ORBIT/EAR/FOSSA W/O DYE: CPT | Mod: TC

## 2021-10-18 ENCOUNTER — TELEPHONE (OUTPATIENT)
Dept: RADIOLOGY | Facility: HOSPITAL | Age: 51
End: 2021-10-18

## 2021-10-19 ENCOUNTER — HOSPITAL ENCOUNTER (OUTPATIENT)
Dept: RADIOLOGY | Facility: HOSPITAL | Age: 51
Discharge: HOME | End: 2021-10-19
Attending: INTERNAL MEDICINE
Payer: MEDICAID

## 2021-10-19 DIAGNOSIS — M54.12 RADICULOPATHY, CERVICAL REGION: ICD-10-CM

## 2021-10-19 PROCEDURE — 72125 CT NECK SPINE W/O DYE: CPT | Mod: TC

## 2021-10-20 ENCOUNTER — HOSPITAL ENCOUNTER (OUTPATIENT)
Dept: RADIOLOGY | Facility: HOSPITAL | Age: 51
Discharge: HOME | End: 2021-10-20
Attending: INTERNAL MEDICINE
Payer: MEDICAID

## 2021-10-20 DIAGNOSIS — C50.919 PRIMARY MALIGNANT NEOPLASM OF BREAST WITH METASTASIS TO OTHER SITE, UNSPECIFIED LATERALITY: ICD-10-CM

## 2021-10-20 DIAGNOSIS — F41.9 ANXIETY: ICD-10-CM

## 2021-10-20 DIAGNOSIS — C53.0 MALIGNANT NEOPLASM OF ENDOCERVIX: ICD-10-CM

## 2021-10-20 DIAGNOSIS — G89.3 CANCER ASSOCIATED PAIN: ICD-10-CM

## 2021-10-20 DIAGNOSIS — C79.51 SECONDARY CANCER OF BONE: ICD-10-CM

## 2021-10-20 PROCEDURE — 78815 NM PET CT ROUTINE: ICD-10-PCS | Mod: 26,PS,, | Performed by: RADIOLOGY

## 2021-10-20 PROCEDURE — 78815 PET IMAGE W/CT SKULL-THIGH: CPT | Mod: 26,PS,, | Performed by: RADIOLOGY

## 2021-10-20 PROCEDURE — 78815 PET IMAGE W/CT SKULL-THIGH: CPT | Mod: TC

## 2021-10-20 RX ORDER — ALPRAZOLAM 1 MG/1
.5-1 TABLET ORAL 2 TIMES DAILY PRN
Qty: 60 TABLET | Refills: 0 | OUTPATIENT
Start: 2021-10-20 | End: 2021-11-19

## 2021-10-20 RX ORDER — MORPHINE SULFATE 15 MG/1
15 TABLET, FILM COATED, EXTENDED RELEASE ORAL EVERY 12 HOURS
Qty: 60 TABLET | Refills: 0 | OUTPATIENT
Start: 2021-10-20

## 2021-10-21 ENCOUNTER — OFFICE VISIT (OUTPATIENT)
Dept: PALLIATIVE MEDICINE | Facility: CLINIC | Age: 51
End: 2021-10-21
Payer: MEDICAID

## 2021-10-21 ENCOUNTER — PATIENT MESSAGE (OUTPATIENT)
Dept: PULMONOLOGY | Facility: CLINIC | Age: 51
End: 2021-10-21

## 2021-10-21 ENCOUNTER — OFFICE VISIT (OUTPATIENT)
Dept: OTOLARYNGOLOGY | Facility: CLINIC | Age: 51
End: 2021-10-21
Payer: MEDICAID

## 2021-10-21 VITALS
BODY MASS INDEX: 29.41 KG/M2 | SYSTOLIC BLOOD PRESSURE: 113 MMHG | HEART RATE: 89 BPM | OXYGEN SATURATION: 99 % | HEIGHT: 67 IN | DIASTOLIC BLOOD PRESSURE: 79 MMHG | WEIGHT: 187.38 LBS | TEMPERATURE: 97 F | RESPIRATION RATE: 18 BRPM

## 2021-10-21 VITALS
BODY MASS INDEX: 29.41 KG/M2 | SYSTOLIC BLOOD PRESSURE: 139 MMHG | DIASTOLIC BLOOD PRESSURE: 79 MMHG | HEIGHT: 67 IN | WEIGHT: 187.38 LBS

## 2021-10-21 DIAGNOSIS — G89.3 NEOPLASM RELATED PAIN: ICD-10-CM

## 2021-10-21 DIAGNOSIS — C53.0 MALIGNANT NEOPLASM OF ENDOCERVIX: ICD-10-CM

## 2021-10-21 DIAGNOSIS — M54.12 RADICULOPATHY, CERVICAL REGION: Primary | ICD-10-CM

## 2021-10-21 DIAGNOSIS — C50.811 MALIGNANT NEOPLASM OF OVERLAPPING SITES OF RIGHT BREAST IN FEMALE, ESTROGEN RECEPTOR POSITIVE: ICD-10-CM

## 2021-10-21 DIAGNOSIS — Z51.5 ENCOUNTER FOR PALLIATIVE CARE: Primary | ICD-10-CM

## 2021-10-21 DIAGNOSIS — G89.3 CANCER ASSOCIATED PAIN: ICD-10-CM

## 2021-10-21 DIAGNOSIS — C79.51 SECONDARY CANCER OF BONE: ICD-10-CM

## 2021-10-21 DIAGNOSIS — H60.42 CHOLESTEATOMA OF LEFT EXTERNAL AUDITORY CANAL: Primary | ICD-10-CM

## 2021-10-21 DIAGNOSIS — F41.9 ANXIETY: ICD-10-CM

## 2021-10-21 DIAGNOSIS — H92.03 OTALGIA OF BOTH EARS: ICD-10-CM

## 2021-10-21 DIAGNOSIS — Z17.0 MALIGNANT NEOPLASM OF OVERLAPPING SITES OF RIGHT BREAST IN FEMALE, ESTROGEN RECEPTOR POSITIVE: ICD-10-CM

## 2021-10-21 PROCEDURE — 99999 PR PBB SHADOW E&M-EST. PATIENT-LVL IV: CPT | Mod: PBBFAC,,, | Performed by: OTOLARYNGOLOGY

## 2021-10-21 PROCEDURE — 99999 PR PBB SHADOW E&M-EST. PATIENT-LVL V: CPT | Mod: PBBFAC,,, | Performed by: NURSE PRACTITIONER

## 2021-10-21 PROCEDURE — 99213 PR OFFICE/OUTPT VISIT, EST, LEVL III, 20-29 MIN: ICD-10-PCS | Mod: S$PBB,,, | Performed by: OTOLARYNGOLOGY

## 2021-10-21 PROCEDURE — 99999 PR PBB SHADOW E&M-EST. PATIENT-LVL IV: ICD-10-PCS | Mod: PBBFAC,,, | Performed by: OTOLARYNGOLOGY

## 2021-10-21 PROCEDURE — 92504 EAR MICROSCOPY EXAMINATION: CPT | Mod: PBBFAC | Performed by: OTOLARYNGOLOGY

## 2021-10-21 PROCEDURE — 92504 EAR MICROSCOPY EXAMINATION: CPT | Mod: S$PBB,,, | Performed by: OTOLARYNGOLOGY

## 2021-10-21 PROCEDURE — 99215 OFFICE O/P EST HI 40 MIN: CPT | Mod: PBBFAC | Performed by: NURSE PRACTITIONER

## 2021-10-21 PROCEDURE — 99215 OFFICE O/P EST HI 40 MIN: CPT | Mod: S$PBB,,, | Performed by: NURSE PRACTITIONER

## 2021-10-21 PROCEDURE — 99213 OFFICE O/P EST LOW 20 MIN: CPT | Mod: S$PBB,,, | Performed by: OTOLARYNGOLOGY

## 2021-10-21 PROCEDURE — 99215 PR OFFICE/OUTPT VISIT, EST, LEVL V, 40-54 MIN: ICD-10-PCS | Mod: S$PBB,,, | Performed by: NURSE PRACTITIONER

## 2021-10-21 PROCEDURE — 92504 PR EAR MICROSCOPY EXAMINATION: ICD-10-PCS | Mod: S$PBB,,, | Performed by: OTOLARYNGOLOGY

## 2021-10-21 PROCEDURE — 99999 PR PBB SHADOW E&M-EST. PATIENT-LVL V: ICD-10-PCS | Mod: PBBFAC,,, | Performed by: NURSE PRACTITIONER

## 2021-10-21 PROCEDURE — 99214 OFFICE O/P EST MOD 30 MIN: CPT | Mod: PBBFAC,27 | Performed by: OTOLARYNGOLOGY

## 2021-10-21 RX ORDER — MORPHINE SULFATE 15 MG/1
15 TABLET, FILM COATED, EXTENDED RELEASE ORAL EVERY 12 HOURS
Qty: 60 TABLET | Refills: 0 | Status: SHIPPED | OUTPATIENT
Start: 2021-10-21 | End: 2021-11-22 | Stop reason: SDUPTHER

## 2021-10-21 RX ORDER — ALPRAZOLAM 1 MG/1
.5-1 TABLET ORAL 2 TIMES DAILY PRN
Qty: 60 TABLET | Refills: 0 | Status: SHIPPED | OUTPATIENT
Start: 2021-10-21 | End: 2021-11-22 | Stop reason: SDUPTHER

## 2021-10-21 RX ORDER — HYDROCODONE BITARTRATE AND ACETAMINOPHEN 10; 325 MG/1; MG/1
1 TABLET ORAL EVERY 4 HOURS PRN
Qty: 60 TABLET | Refills: 0 | Status: SHIPPED | OUTPATIENT
Start: 2021-10-21 | End: 2021-11-02 | Stop reason: SDUPTHER

## 2021-10-22 ENCOUNTER — TELEPHONE (OUTPATIENT)
Dept: PULMONOLOGY | Facility: CLINIC | Age: 51
End: 2021-10-22

## 2021-10-22 ENCOUNTER — PATIENT MESSAGE (OUTPATIENT)
Dept: PALLIATIVE MEDICINE | Facility: CLINIC | Age: 51
End: 2021-10-22
Payer: MEDICAID

## 2021-10-26 ENCOUNTER — INFUSION (OUTPATIENT)
Dept: INFUSION THERAPY | Facility: HOSPITAL | Age: 51
End: 2021-10-26
Attending: INTERNAL MEDICINE
Payer: MEDICAID

## 2021-10-26 ENCOUNTER — PATIENT MESSAGE (OUTPATIENT)
Dept: HEMATOLOGY/ONCOLOGY | Facility: CLINIC | Age: 51
End: 2021-10-26

## 2021-10-26 ENCOUNTER — OFFICE VISIT (OUTPATIENT)
Dept: HEMATOLOGY/ONCOLOGY | Facility: CLINIC | Age: 51
End: 2021-10-26
Payer: MEDICAID

## 2021-10-26 VITALS
OXYGEN SATURATION: 97 % | TEMPERATURE: 98 F | BODY MASS INDEX: 30.27 KG/M2 | DIASTOLIC BLOOD PRESSURE: 61 MMHG | RESPIRATION RATE: 18 BRPM | WEIGHT: 192.88 LBS | HEIGHT: 67 IN | HEART RATE: 73 BPM | SYSTOLIC BLOOD PRESSURE: 120 MMHG

## 2021-10-26 VITALS
DIASTOLIC BLOOD PRESSURE: 82 MMHG | TEMPERATURE: 97 F | HEART RATE: 86 BPM | HEIGHT: 67 IN | WEIGHT: 192.88 LBS | SYSTOLIC BLOOD PRESSURE: 113 MMHG | BODY MASS INDEX: 30.27 KG/M2 | OXYGEN SATURATION: 99 %

## 2021-10-26 DIAGNOSIS — C79.51 SECONDARY CANCER OF BONE: ICD-10-CM

## 2021-10-26 DIAGNOSIS — C50.919 PRIMARY MALIGNANT NEOPLASM OF BREAST WITH METASTASIS TO OTHER SITE, UNSPECIFIED LATERALITY: ICD-10-CM

## 2021-10-26 DIAGNOSIS — E03.9 HYPOTHYROIDISM, UNSPECIFIED TYPE: ICD-10-CM

## 2021-10-26 DIAGNOSIS — H66.90 OTITIS MEDIA, UNSPECIFIED LATERALITY, UNSPECIFIED OTITIS MEDIA TYPE: ICD-10-CM

## 2021-10-26 DIAGNOSIS — G62.0 DRUG-INDUCED POLYNEUROPATHY: ICD-10-CM

## 2021-10-26 DIAGNOSIS — Z79.811 AROMATASE INHIBITOR USE: ICD-10-CM

## 2021-10-26 DIAGNOSIS — C53.0 MALIGNANT NEOPLASM OF ENDOCERVIX: Primary | ICD-10-CM

## 2021-10-26 DIAGNOSIS — R60.0 LEG EDEMA, LEFT: ICD-10-CM

## 2021-10-26 DIAGNOSIS — C73 PAPILLARY THYROID CARCINOMA: ICD-10-CM

## 2021-10-26 PROCEDURE — 99215 OFFICE O/P EST HI 40 MIN: CPT | Mod: S$PBB,,, | Performed by: INTERNAL MEDICINE

## 2021-10-26 PROCEDURE — 99215 PR OFFICE/OUTPT VISIT, EST, LEVL V, 40-54 MIN: ICD-10-PCS | Mod: S$PBB,,, | Performed by: INTERNAL MEDICINE

## 2021-10-26 PROCEDURE — 99215 OFFICE O/P EST HI 40 MIN: CPT | Mod: PBBFAC,25 | Performed by: INTERNAL MEDICINE

## 2021-10-26 PROCEDURE — 25000003 PHARM REV CODE 250: Performed by: INTERNAL MEDICINE

## 2021-10-26 PROCEDURE — 96413 CHEMO IV INFUSION 1 HR: CPT

## 2021-10-26 PROCEDURE — 99999 PR PBB SHADOW E&M-EST. PATIENT-LVL V: CPT | Mod: PBBFAC,,, | Performed by: INTERNAL MEDICINE

## 2021-10-26 PROCEDURE — 99999 PR PBB SHADOW E&M-EST. PATIENT-LVL V: ICD-10-PCS | Mod: PBBFAC,,, | Performed by: INTERNAL MEDICINE

## 2021-10-26 PROCEDURE — 63600175 PHARM REV CODE 636 W HCPCS: Performed by: INTERNAL MEDICINE

## 2021-10-26 RX ORDER — EPINEPHRINE 0.3 MG/.3ML
0.3 INJECTION SUBCUTANEOUS ONCE AS NEEDED
Status: CANCELLED | OUTPATIENT
Start: 2021-10-26

## 2021-10-26 RX ORDER — DIPHENHYDRAMINE HYDROCHLORIDE 50 MG/ML
25 INJECTION INTRAMUSCULAR; INTRAVENOUS EVERY 10 MIN PRN
Status: CANCELLED | OUTPATIENT
Start: 2021-10-26 | End: 2021-10-27

## 2021-10-26 RX ORDER — METHYLPREDNISOLONE SOD SUCC 125 MG
125 VIAL (EA) INJECTION ONCE AS NEEDED
Status: CANCELLED | OUTPATIENT
Start: 2021-10-26

## 2021-10-26 RX ORDER — SODIUM CHLORIDE 0.9 % (FLUSH) 0.9 %
10 SYRINGE (ML) INJECTION
Status: DISCONTINUED | OUTPATIENT
Start: 2021-10-26 | End: 2021-10-26 | Stop reason: HOSPADM

## 2021-10-26 RX ORDER — HEPARIN 100 UNIT/ML
500 SYRINGE INTRAVENOUS
Status: DISCONTINUED | OUTPATIENT
Start: 2021-10-26 | End: 2021-10-26 | Stop reason: HOSPADM

## 2021-10-26 RX ORDER — HEPARIN 100 UNIT/ML
500 SYRINGE INTRAVENOUS
Status: CANCELLED | OUTPATIENT
Start: 2021-10-26

## 2021-10-26 RX ORDER — SODIUM CHLORIDE 0.9 % (FLUSH) 0.9 %
10 SYRINGE (ML) INJECTION
Status: CANCELLED | OUTPATIENT
Start: 2021-10-26

## 2021-10-26 RX ADMIN — HEPARIN 500 UNITS: 100 SYRINGE at 02:10

## 2021-10-26 RX ADMIN — BEVACIZUMAB 1345 MG: 400 INJECTION, SOLUTION INTRAVENOUS at 02:10

## 2021-10-27 ENCOUNTER — HOSPITAL ENCOUNTER (OUTPATIENT)
Dept: RADIOLOGY | Facility: HOSPITAL | Age: 51
Discharge: HOME | End: 2021-10-27
Attending: INTERNAL MEDICINE
Payer: MEDICAID

## 2021-10-27 DIAGNOSIS — R60.0 LEG EDEMA, LEFT: ICD-10-CM

## 2021-10-27 PROCEDURE — 93971 EXTREMITY STUDY: CPT | Mod: TC,LT

## 2021-10-29 ENCOUNTER — HOSPITAL ENCOUNTER (OUTPATIENT)
Dept: RADIOLOGY | Facility: HOSPITAL | Age: 51
Discharge: HOME | End: 2021-10-29
Attending: PHYSICIAN ASSISTANT
Payer: MEDICAID

## 2021-10-29 ENCOUNTER — PATIENT MESSAGE (OUTPATIENT)
Dept: PALLIATIVE MEDICINE | Facility: CLINIC | Age: 51
End: 2021-10-29
Payer: MEDICAID

## 2021-10-29 ENCOUNTER — OFFICE VISIT (OUTPATIENT)
Dept: NEUROSURGERY | Facility: CLINIC | Age: 51
End: 2021-10-29
Payer: MEDICAID

## 2021-10-29 VITALS
HEIGHT: 67 IN | WEIGHT: 191.81 LBS | BODY MASS INDEX: 30.1 KG/M2 | DIASTOLIC BLOOD PRESSURE: 75 MMHG | SYSTOLIC BLOOD PRESSURE: 115 MMHG | RESPIRATION RATE: 18 BRPM | HEART RATE: 83 BPM

## 2021-10-29 DIAGNOSIS — M79.661 PAIN AND SWELLING OF RIGHT LOWER LEG: ICD-10-CM

## 2021-10-29 DIAGNOSIS — M79.89 PAIN AND SWELLING OF RIGHT LOWER LEG: Primary | ICD-10-CM

## 2021-10-29 DIAGNOSIS — M79.661 PAIN AND SWELLING OF RIGHT LOWER LEG: Primary | ICD-10-CM

## 2021-10-29 DIAGNOSIS — R29.898 WEAKNESS OF RIGHT LOWER EXTREMITY: ICD-10-CM

## 2021-10-29 DIAGNOSIS — M48.02 SPINAL STENOSIS, CERVICAL REGION: ICD-10-CM

## 2021-10-29 DIAGNOSIS — M54.12 RADICULOPATHY, CERVICAL REGION: ICD-10-CM

## 2021-10-29 DIAGNOSIS — M54.16 LUMBAR RADICULOPATHY: ICD-10-CM

## 2021-10-29 DIAGNOSIS — M79.89 PAIN AND SWELLING OF RIGHT LOWER LEG: ICD-10-CM

## 2021-10-29 PROCEDURE — 99999 PR PBB SHADOW E&M-EST. PATIENT-LVL V: CPT | Mod: PBBFAC,,, | Performed by: PHYSICIAN ASSISTANT

## 2021-10-29 PROCEDURE — 99204 OFFICE O/P NEW MOD 45 MIN: CPT | Mod: S$PBB,,, | Performed by: PHYSICIAN ASSISTANT

## 2021-10-29 PROCEDURE — 99204 PR OFFICE/OUTPT VISIT, NEW, LEVL IV, 45-59 MIN: ICD-10-PCS | Mod: S$PBB,,, | Performed by: PHYSICIAN ASSISTANT

## 2021-10-29 PROCEDURE — 93971 EXTREMITY STUDY: CPT | Mod: TC,RT

## 2021-10-29 PROCEDURE — 99215 OFFICE O/P EST HI 40 MIN: CPT | Mod: PBBFAC,25 | Performed by: PHYSICIAN ASSISTANT

## 2021-10-29 PROCEDURE — 93971 US LOWER EXTREMITY VEINS RIGHT: ICD-10-PCS | Mod: 26,RT,, | Performed by: RADIOLOGY

## 2021-10-29 PROCEDURE — 99999 PR PBB SHADOW E&M-EST. PATIENT-LVL V: ICD-10-PCS | Mod: PBBFAC,,, | Performed by: PHYSICIAN ASSISTANT

## 2021-10-29 PROCEDURE — 93971 EXTREMITY STUDY: CPT | Mod: 26,RT,, | Performed by: RADIOLOGY

## 2021-11-02 DIAGNOSIS — G89.3 CANCER ASSOCIATED PAIN: ICD-10-CM

## 2021-11-02 RX ORDER — HYDROCODONE BITARTRATE AND ACETAMINOPHEN 10; 325 MG/1; MG/1
1 TABLET ORAL EVERY 4 HOURS PRN
Qty: 180 TABLET | Refills: 0 | Status: SHIPPED | OUTPATIENT
Start: 2021-11-02 | End: 2021-12-01 | Stop reason: SDUPTHER

## 2021-11-09 ENCOUNTER — PATIENT MESSAGE (OUTPATIENT)
Dept: HEMATOLOGY/ONCOLOGY | Facility: CLINIC | Age: 51
End: 2021-11-09
Payer: MEDICAID

## 2021-11-15 ENCOUNTER — SPECIALTY PHARMACY (OUTPATIENT)
Dept: PHARMACY | Facility: CLINIC | Age: 51
End: 2021-11-15
Payer: MEDICAID

## 2021-11-15 DIAGNOSIS — C50.811 MALIGNANT NEOPLASM OF OVERLAPPING SITES OF RIGHT BREAST IN FEMALE, ESTROGEN RECEPTOR POSITIVE: ICD-10-CM

## 2021-11-15 DIAGNOSIS — Z17.0 MALIGNANT NEOPLASM OF OVERLAPPING SITES OF RIGHT BREAST IN FEMALE, ESTROGEN RECEPTOR POSITIVE: ICD-10-CM

## 2021-11-16 ENCOUNTER — OFFICE VISIT (OUTPATIENT)
Dept: HEMATOLOGY/ONCOLOGY | Facility: CLINIC | Age: 51
End: 2021-11-16
Payer: MEDICAID

## 2021-11-16 ENCOUNTER — SPECIALTY PHARMACY (OUTPATIENT)
Dept: PHARMACY | Facility: CLINIC | Age: 51
End: 2021-11-16
Payer: MEDICAID

## 2021-11-16 ENCOUNTER — INFUSION (OUTPATIENT)
Dept: INFUSION THERAPY | Facility: HOSPITAL | Age: 51
End: 2021-11-16
Attending: INTERNAL MEDICINE
Payer: MEDICAID

## 2021-11-16 VITALS
OXYGEN SATURATION: 97 % | TEMPERATURE: 98 F | WEIGHT: 195.13 LBS | BODY MASS INDEX: 30.63 KG/M2 | DIASTOLIC BLOOD PRESSURE: 84 MMHG | HEIGHT: 67 IN | SYSTOLIC BLOOD PRESSURE: 123 MMHG | HEART RATE: 89 BPM

## 2021-11-16 VITALS
OXYGEN SATURATION: 98 % | TEMPERATURE: 98 F | RESPIRATION RATE: 16 BRPM | HEART RATE: 72 BPM | SYSTOLIC BLOOD PRESSURE: 142 MMHG | DIASTOLIC BLOOD PRESSURE: 85 MMHG

## 2021-11-16 DIAGNOSIS — C50.919 PRIMARY MALIGNANT NEOPLASM OF BREAST WITH METASTASIS TO OTHER SITE, UNSPECIFIED LATERALITY: Primary | ICD-10-CM

## 2021-11-16 DIAGNOSIS — Z17.0 MALIGNANT NEOPLASM OF OVERLAPPING SITES OF RIGHT BREAST IN FEMALE, ESTROGEN RECEPTOR POSITIVE: ICD-10-CM

## 2021-11-16 DIAGNOSIS — C53.0 MALIGNANT NEOPLASM OF ENDOCERVIX: Primary | ICD-10-CM

## 2021-11-16 DIAGNOSIS — Z79.811 AROMATASE INHIBITOR USE: ICD-10-CM

## 2021-11-16 DIAGNOSIS — C79.51 SECONDARY CANCER OF BONE: ICD-10-CM

## 2021-11-16 DIAGNOSIS — C50.811 MALIGNANT NEOPLASM OF OVERLAPPING SITES OF RIGHT BREAST IN FEMALE, ESTROGEN RECEPTOR POSITIVE: ICD-10-CM

## 2021-11-16 PROCEDURE — 99215 OFFICE O/P EST HI 40 MIN: CPT | Mod: S$PBB,,, | Performed by: INTERNAL MEDICINE

## 2021-11-16 PROCEDURE — 96413 CHEMO IV INFUSION 1 HR: CPT

## 2021-11-16 PROCEDURE — 99214 OFFICE O/P EST MOD 30 MIN: CPT | Mod: PBBFAC,25 | Performed by: INTERNAL MEDICINE

## 2021-11-16 PROCEDURE — 99999 PR PBB SHADOW E&M-EST. PATIENT-LVL IV: CPT | Mod: PBBFAC,,, | Performed by: INTERNAL MEDICINE

## 2021-11-16 PROCEDURE — 99999 PR PBB SHADOW E&M-EST. PATIENT-LVL IV: ICD-10-PCS | Mod: PBBFAC,,, | Performed by: INTERNAL MEDICINE

## 2021-11-16 PROCEDURE — 99215 PR OFFICE/OUTPT VISIT, EST, LEVL V, 40-54 MIN: ICD-10-PCS | Mod: S$PBB,,, | Performed by: INTERNAL MEDICINE

## 2021-11-16 PROCEDURE — 63600175 PHARM REV CODE 636 W HCPCS: Performed by: INTERNAL MEDICINE

## 2021-11-16 PROCEDURE — 25000003 PHARM REV CODE 250: Performed by: INTERNAL MEDICINE

## 2021-11-16 RX ORDER — METHYLPREDNISOLONE SOD SUCC 125 MG
125 VIAL (EA) INJECTION ONCE AS NEEDED
Status: CANCELLED | OUTPATIENT
Start: 2021-11-16

## 2021-11-16 RX ORDER — HEPARIN 100 UNIT/ML
500 SYRINGE INTRAVENOUS
Status: DISCONTINUED | OUTPATIENT
Start: 2021-11-16 | End: 2021-11-16 | Stop reason: HOSPADM

## 2021-11-16 RX ORDER — DIPHENHYDRAMINE HYDROCHLORIDE 50 MG/ML
25 INJECTION INTRAMUSCULAR; INTRAVENOUS EVERY 10 MIN PRN
Status: CANCELLED | OUTPATIENT
Start: 2021-11-16 | End: 2021-11-17

## 2021-11-16 RX ORDER — SODIUM CHLORIDE 0.9 % (FLUSH) 0.9 %
10 SYRINGE (ML) INJECTION
Status: DISCONTINUED | OUTPATIENT
Start: 2021-11-16 | End: 2021-11-16 | Stop reason: HOSPADM

## 2021-11-16 RX ORDER — HEPARIN 100 UNIT/ML
500 SYRINGE INTRAVENOUS
Status: CANCELLED | OUTPATIENT
Start: 2021-11-16

## 2021-11-16 RX ORDER — EPINEPHRINE 0.3 MG/.3ML
0.3 INJECTION SUBCUTANEOUS ONCE AS NEEDED
Status: CANCELLED | OUTPATIENT
Start: 2021-11-16

## 2021-11-16 RX ORDER — SODIUM CHLORIDE 0.9 % (FLUSH) 0.9 %
10 SYRINGE (ML) INJECTION
Status: CANCELLED | OUTPATIENT
Start: 2021-11-16

## 2021-11-16 RX ADMIN — HEPARIN 500 UNITS: 100 SYRINGE at 02:11

## 2021-11-16 RX ADMIN — BEVACIZUMAB 1345 MG: 400 INJECTION, SOLUTION INTRAVENOUS at 02:11

## 2021-11-17 ENCOUNTER — OFFICE VISIT (OUTPATIENT)
Dept: GYNECOLOGIC ONCOLOGY | Facility: CLINIC | Age: 51
End: 2021-11-17
Payer: MEDICAID

## 2021-11-17 VITALS
BODY MASS INDEX: 30.76 KG/M2 | DIASTOLIC BLOOD PRESSURE: 97 MMHG | WEIGHT: 196 LBS | SYSTOLIC BLOOD PRESSURE: 134 MMHG | HEART RATE: 85 BPM | HEIGHT: 67 IN

## 2021-11-17 DIAGNOSIS — C50.811 MALIGNANT NEOPLASM OF OVERLAPPING SITES OF RIGHT BREAST IN FEMALE, ESTROGEN RECEPTOR POSITIVE: ICD-10-CM

## 2021-11-17 DIAGNOSIS — E66.9 OBESITY (BMI 30-39.9): ICD-10-CM

## 2021-11-17 DIAGNOSIS — C73 PAPILLARY THYROID CARCINOMA: ICD-10-CM

## 2021-11-17 DIAGNOSIS — Z17.0 MALIGNANT NEOPLASM OF OVERLAPPING SITES OF RIGHT BREAST IN FEMALE, ESTROGEN RECEPTOR POSITIVE: ICD-10-CM

## 2021-11-17 DIAGNOSIS — C53.0 MALIGNANT NEOPLASM OF ENDOCERVIX: Primary | ICD-10-CM

## 2021-11-17 DIAGNOSIS — J44.9 CHRONIC OBSTRUCTIVE PULMONARY DISEASE, UNSPECIFIED COPD TYPE: ICD-10-CM

## 2021-11-17 PROCEDURE — 99213 OFFICE O/P EST LOW 20 MIN: CPT | Mod: PBBFAC | Performed by: OBSTETRICS & GYNECOLOGY

## 2021-11-17 PROCEDURE — 99999 PR PBB SHADOW E&M-EST. PATIENT-LVL III: CPT | Mod: PBBFAC,,, | Performed by: OBSTETRICS & GYNECOLOGY

## 2021-11-17 PROCEDURE — 99999 PR PBB SHADOW E&M-EST. PATIENT-LVL III: ICD-10-PCS | Mod: PBBFAC,,, | Performed by: OBSTETRICS & GYNECOLOGY

## 2021-11-17 PROCEDURE — 99215 PR OFFICE/OUTPT VISIT, EST, LEVL V, 40-54 MIN: ICD-10-PCS | Mod: S$PBB,,, | Performed by: OBSTETRICS & GYNECOLOGY

## 2021-11-17 PROCEDURE — 99215 OFFICE O/P EST HI 40 MIN: CPT | Mod: S$PBB,,, | Performed by: OBSTETRICS & GYNECOLOGY

## 2021-12-01 DIAGNOSIS — G89.3 CANCER ASSOCIATED PAIN: ICD-10-CM

## 2021-12-02 DIAGNOSIS — G89.3 CANCER ASSOCIATED PAIN: ICD-10-CM

## 2021-12-02 RX ORDER — HYDROCODONE BITARTRATE AND ACETAMINOPHEN 10; 325 MG/1; MG/1
1 TABLET ORAL EVERY 4 HOURS PRN
Qty: 180 TABLET | Refills: 0 | Status: SHIPPED | OUTPATIENT
Start: 2021-12-02 | End: 2021-12-02 | Stop reason: SDUPTHER

## 2021-12-03 RX ORDER — HYDROCODONE BITARTRATE AND ACETAMINOPHEN 10; 325 MG/1; MG/1
1 TABLET ORAL EVERY 4 HOURS PRN
Qty: 180 TABLET | Refills: 0 | Status: SHIPPED | OUTPATIENT
Start: 2021-12-03 | End: 2022-01-02 | Stop reason: SDUPTHER

## 2021-12-07 ENCOUNTER — INFUSION (OUTPATIENT)
Dept: INFUSION THERAPY | Facility: HOSPITAL | Age: 51
End: 2021-12-07
Attending: INTERNAL MEDICINE
Payer: MEDICAID

## 2021-12-07 ENCOUNTER — OFFICE VISIT (OUTPATIENT)
Dept: HEMATOLOGY/ONCOLOGY | Facility: CLINIC | Age: 51
End: 2021-12-07
Payer: MEDICAID

## 2021-12-07 VITALS
HEIGHT: 67 IN | HEART RATE: 104 BPM | SYSTOLIC BLOOD PRESSURE: 120 MMHG | DIASTOLIC BLOOD PRESSURE: 75 MMHG | WEIGHT: 193.81 LBS | OXYGEN SATURATION: 98 % | BODY MASS INDEX: 30.42 KG/M2 | TEMPERATURE: 97 F

## 2021-12-07 VITALS
BODY MASS INDEX: 30.42 KG/M2 | SYSTOLIC BLOOD PRESSURE: 116 MMHG | HEART RATE: 72 BPM | HEIGHT: 67 IN | WEIGHT: 193.81 LBS | RESPIRATION RATE: 18 BRPM | DIASTOLIC BLOOD PRESSURE: 68 MMHG | OXYGEN SATURATION: 97 % | TEMPERATURE: 98 F

## 2021-12-07 DIAGNOSIS — Z17.0 MALIGNANT NEOPLASM OF OVERLAPPING SITES OF RIGHT BREAST IN FEMALE, ESTROGEN RECEPTOR POSITIVE: ICD-10-CM

## 2021-12-07 DIAGNOSIS — C53.0 MALIGNANT NEOPLASM OF ENDOCERVIX: ICD-10-CM

## 2021-12-07 DIAGNOSIS — C50.811 MALIGNANT NEOPLASM OF OVERLAPPING SITES OF RIGHT BREAST IN FEMALE, ESTROGEN RECEPTOR POSITIVE: ICD-10-CM

## 2021-12-07 DIAGNOSIS — C50.919 PRIMARY MALIGNANT NEOPLASM OF BREAST WITH METASTASIS TO OTHER SITE, UNSPECIFIED LATERALITY: ICD-10-CM

## 2021-12-07 DIAGNOSIS — C53.0 MALIGNANT NEOPLASM OF ENDOCERVIX: Primary | ICD-10-CM

## 2021-12-07 DIAGNOSIS — C73 PAPILLARY THYROID CARCINOMA: ICD-10-CM

## 2021-12-07 PROCEDURE — 99213 OFFICE O/P EST LOW 20 MIN: CPT | Mod: PBBFAC,25 | Performed by: NURSE PRACTITIONER

## 2021-12-07 PROCEDURE — 99214 PR OFFICE/OUTPT VISIT, EST, LEVL IV, 30-39 MIN: ICD-10-PCS | Mod: 25,S$PBB,, | Performed by: NURSE PRACTITIONER

## 2021-12-07 PROCEDURE — 99214 OFFICE O/P EST MOD 30 MIN: CPT | Mod: 25,S$PBB,, | Performed by: NURSE PRACTITIONER

## 2021-12-07 PROCEDURE — 25000003 PHARM REV CODE 250: Performed by: INTERNAL MEDICINE

## 2021-12-07 PROCEDURE — 63600175 PHARM REV CODE 636 W HCPCS: Mod: JG | Performed by: INTERNAL MEDICINE

## 2021-12-07 PROCEDURE — 25000003 PHARM REV CODE 250: Performed by: NURSE PRACTITIONER

## 2021-12-07 PROCEDURE — 96413 CHEMO IV INFUSION 1 HR: CPT

## 2021-12-07 PROCEDURE — 99999 PR PBB SHADOW E&M-EST. PATIENT-LVL III: CPT | Mod: PBBFAC,,, | Performed by: NURSE PRACTITIONER

## 2021-12-07 PROCEDURE — A4216 STERILE WATER/SALINE, 10 ML: HCPCS | Performed by: INTERNAL MEDICINE

## 2021-12-07 PROCEDURE — 99999 PR PBB SHADOW E&M-EST. PATIENT-LVL III: ICD-10-PCS | Mod: PBBFAC,,, | Performed by: NURSE PRACTITIONER

## 2021-12-07 RX ORDER — SODIUM CHLORIDE 0.9 % (FLUSH) 0.9 %
10 SYRINGE (ML) INJECTION
Status: DISCONTINUED | OUTPATIENT
Start: 2021-12-07 | End: 2021-12-07 | Stop reason: HOSPADM

## 2021-12-07 RX ORDER — HEPARIN 100 UNIT/ML
500 SYRINGE INTRAVENOUS
Status: CANCELLED | OUTPATIENT
Start: 2021-12-07

## 2021-12-07 RX ORDER — SODIUM CHLORIDE 0.9 % (FLUSH) 0.9 %
10 SYRINGE (ML) INJECTION
Status: CANCELLED | OUTPATIENT
Start: 2021-12-07

## 2021-12-07 RX ORDER — DIPHENHYDRAMINE HYDROCHLORIDE 50 MG/ML
25 INJECTION INTRAMUSCULAR; INTRAVENOUS EVERY 10 MIN PRN
Status: CANCELLED | OUTPATIENT
Start: 2021-12-07 | End: 2021-12-08

## 2021-12-07 RX ORDER — METHYLPREDNISOLONE SOD SUCC 125 MG
125 VIAL (EA) INJECTION ONCE AS NEEDED
Status: CANCELLED | OUTPATIENT
Start: 2021-12-07

## 2021-12-07 RX ORDER — HEPARIN 100 UNIT/ML
500 SYRINGE INTRAVENOUS
Status: DISCONTINUED | OUTPATIENT
Start: 2021-12-07 | End: 2021-12-07 | Stop reason: HOSPADM

## 2021-12-07 RX ORDER — SODIUM CHLORIDE 9 MG/ML
INJECTION, SOLUTION INTRAVENOUS
Status: COMPLETED | OUTPATIENT
Start: 2021-12-07 | End: 2021-12-07

## 2021-12-07 RX ORDER — EPINEPHRINE 0.3 MG/.3ML
0.3 INJECTION SUBCUTANEOUS ONCE AS NEEDED
Status: CANCELLED | OUTPATIENT
Start: 2021-12-07

## 2021-12-07 RX ADMIN — HEPARIN 500 UNITS: 100 SYRINGE at 03:12

## 2021-12-07 RX ADMIN — BEVACIZUMAB 1300 MG: 400 INJECTION, SOLUTION INTRAVENOUS at 02:12

## 2021-12-07 RX ADMIN — Medication 10 ML: at 03:12

## 2021-12-07 RX ADMIN — SODIUM CHLORIDE: 0.9 INJECTION, SOLUTION INTRAVENOUS at 03:12

## 2021-12-09 ENCOUNTER — SPECIALTY PHARMACY (OUTPATIENT)
Dept: PHARMACY | Facility: CLINIC | Age: 51
End: 2021-12-09
Payer: MEDICAID

## 2021-12-09 DIAGNOSIS — C50.811 MALIGNANT NEOPLASM OF OVERLAPPING SITES OF RIGHT BREAST IN FEMALE, ESTROGEN RECEPTOR POSITIVE: ICD-10-CM

## 2021-12-09 DIAGNOSIS — Z17.0 MALIGNANT NEOPLASM OF OVERLAPPING SITES OF RIGHT BREAST IN FEMALE, ESTROGEN RECEPTOR POSITIVE: ICD-10-CM

## 2021-12-16 ENCOUNTER — OFFICE VISIT (OUTPATIENT)
Dept: OTOLARYNGOLOGY | Facility: CLINIC | Age: 51
End: 2021-12-16
Payer: MEDICAID

## 2021-12-16 VITALS — TEMPERATURE: 98 F | WEIGHT: 194.88 LBS | HEIGHT: 67 IN | BODY MASS INDEX: 30.59 KG/M2

## 2021-12-16 DIAGNOSIS — H60.42 CHOLESTEATOMA OF EXTERNAL AUDITORY CANAL, LEFT: ICD-10-CM

## 2021-12-16 DIAGNOSIS — H60.42 CHOLESTEATOMA OF LEFT EXTERNAL AUDITORY CANAL: Primary | ICD-10-CM

## 2021-12-16 PROCEDURE — 99213 OFFICE O/P EST LOW 20 MIN: CPT | Mod: 25,S$PBB,, | Performed by: OTOLARYNGOLOGY

## 2021-12-16 PROCEDURE — 99213 PR OFFICE/OUTPT VISIT, EST, LEVL III, 20-29 MIN: ICD-10-PCS | Mod: 25,S$PBB,, | Performed by: OTOLARYNGOLOGY

## 2021-12-16 PROCEDURE — 92504 EAR MICROSCOPY EXAMINATION: CPT | Mod: S$PBB,,, | Performed by: OTOLARYNGOLOGY

## 2021-12-16 PROCEDURE — 99214 OFFICE O/P EST MOD 30 MIN: CPT | Mod: PBBFAC | Performed by: OTOLARYNGOLOGY

## 2021-12-16 PROCEDURE — 92504 PR EAR MICROSCOPY EXAMINATION: ICD-10-PCS | Mod: S$PBB,,, | Performed by: OTOLARYNGOLOGY

## 2021-12-16 PROCEDURE — 99999 PR PBB SHADOW E&M-EST. PATIENT-LVL IV: ICD-10-PCS | Mod: PBBFAC,,, | Performed by: OTOLARYNGOLOGY

## 2021-12-16 PROCEDURE — 99999 PR PBB SHADOW E&M-EST. PATIENT-LVL IV: CPT | Mod: PBBFAC,,, | Performed by: OTOLARYNGOLOGY

## 2021-12-16 PROCEDURE — 92504 EAR MICROSCOPY EXAMINATION: CPT | Mod: PBBFAC | Performed by: OTOLARYNGOLOGY

## 2021-12-28 ENCOUNTER — OFFICE VISIT (OUTPATIENT)
Dept: HEMATOLOGY/ONCOLOGY | Facility: CLINIC | Age: 51
End: 2021-12-28
Payer: MEDICAID

## 2021-12-28 ENCOUNTER — INFUSION (OUTPATIENT)
Dept: INFUSION THERAPY | Facility: HOSPITAL | Age: 51
End: 2021-12-28
Attending: INTERNAL MEDICINE
Payer: MEDICAID

## 2021-12-28 VITALS
OXYGEN SATURATION: 100 % | RESPIRATION RATE: 18 BRPM | SYSTOLIC BLOOD PRESSURE: 125 MMHG | DIASTOLIC BLOOD PRESSURE: 76 MMHG | HEART RATE: 64 BPM | TEMPERATURE: 98 F

## 2021-12-28 VITALS
HEIGHT: 67 IN | TEMPERATURE: 97 F | WEIGHT: 199.06 LBS | BODY MASS INDEX: 31.24 KG/M2 | SYSTOLIC BLOOD PRESSURE: 129 MMHG | OXYGEN SATURATION: 98 % | DIASTOLIC BLOOD PRESSURE: 76 MMHG | HEART RATE: 88 BPM

## 2021-12-28 DIAGNOSIS — C53.0 MALIGNANT NEOPLASM OF ENDOCERVIX: ICD-10-CM

## 2021-12-28 DIAGNOSIS — R30.0 DYSURIA: ICD-10-CM

## 2021-12-28 DIAGNOSIS — C53.0 MALIGNANT NEOPLASM OF ENDOCERVIX: Primary | ICD-10-CM

## 2021-12-28 DIAGNOSIS — Z72.0 TOBACCO ABUSE: Primary | ICD-10-CM

## 2021-12-28 DIAGNOSIS — C50.811 MALIGNANT NEOPLASM OF OVERLAPPING SITES OF RIGHT BREAST IN FEMALE, ESTROGEN RECEPTOR POSITIVE: ICD-10-CM

## 2021-12-28 DIAGNOSIS — N17.9 AKI (ACUTE KIDNEY INJURY): ICD-10-CM

## 2021-12-28 DIAGNOSIS — Z17.0 MALIGNANT NEOPLASM OF OVERLAPPING SITES OF RIGHT BREAST IN FEMALE, ESTROGEN RECEPTOR POSITIVE: ICD-10-CM

## 2021-12-28 PROCEDURE — 3078F PR MOST RECENT DIASTOLIC BLOOD PRESSURE < 80 MM HG: ICD-10-PCS | Mod: CPTII,95,, | Performed by: INTERNAL MEDICINE

## 2021-12-28 PROCEDURE — 3008F BODY MASS INDEX DOCD: CPT | Mod: CPTII,95,, | Performed by: INTERNAL MEDICINE

## 2021-12-28 PROCEDURE — 96361 HYDRATE IV INFUSION ADD-ON: CPT

## 2021-12-28 PROCEDURE — 99406 BEHAV CHNG SMOKING 3-10 MIN: CPT | Mod: 95,,, | Performed by: INTERNAL MEDICINE

## 2021-12-28 PROCEDURE — 3044F PR MOST RECENT HEMOGLOBIN A1C LEVEL <7.0%: ICD-10-PCS | Mod: CPTII,95,, | Performed by: INTERNAL MEDICINE

## 2021-12-28 PROCEDURE — 99215 OFFICE O/P EST HI 40 MIN: CPT | Mod: 95,25,, | Performed by: INTERNAL MEDICINE

## 2021-12-28 PROCEDURE — 99406 PR TOBACCO USE CESSATION INTERMEDIATE 3-10 MINUTES: ICD-10-PCS | Mod: 95,,, | Performed by: INTERNAL MEDICINE

## 2021-12-28 PROCEDURE — 96413 CHEMO IV INFUSION 1 HR: CPT

## 2021-12-28 PROCEDURE — 1160F PR REVIEW ALL MEDS BY PRESCRIBER/CLIN PHARMACIST DOCUMENTED: ICD-10-PCS | Mod: CPTII,95,, | Performed by: INTERNAL MEDICINE

## 2021-12-28 PROCEDURE — 3074F PR MOST RECENT SYSTOLIC BLOOD PRESSURE < 130 MM HG: ICD-10-PCS | Mod: CPTII,95,, | Performed by: INTERNAL MEDICINE

## 2021-12-28 PROCEDURE — 3074F SYST BP LT 130 MM HG: CPT | Mod: CPTII,95,, | Performed by: INTERNAL MEDICINE

## 2021-12-28 PROCEDURE — 3078F DIAST BP <80 MM HG: CPT | Mod: CPTII,95,, | Performed by: INTERNAL MEDICINE

## 2021-12-28 PROCEDURE — 1159F PR MEDICATION LIST DOCUMENTED IN MEDICAL RECORD: ICD-10-PCS | Mod: CPTII,95,, | Performed by: INTERNAL MEDICINE

## 2021-12-28 PROCEDURE — 1159F MED LIST DOCD IN RCRD: CPT | Mod: CPTII,95,, | Performed by: INTERNAL MEDICINE

## 2021-12-28 PROCEDURE — 96360 HYDRATION IV INFUSION INIT: CPT

## 2021-12-28 PROCEDURE — 63600175 PHARM REV CODE 636 W HCPCS

## 2021-12-28 PROCEDURE — 25000003 PHARM REV CODE 250: Performed by: INTERNAL MEDICINE

## 2021-12-28 PROCEDURE — 3008F PR BODY MASS INDEX (BMI) DOCUMENTED: ICD-10-PCS | Mod: CPTII,95,, | Performed by: INTERNAL MEDICINE

## 2021-12-28 PROCEDURE — 3044F HG A1C LEVEL LT 7.0%: CPT | Mod: CPTII,95,, | Performed by: INTERNAL MEDICINE

## 2021-12-28 PROCEDURE — 99215 PR OFFICE/OUTPT VISIT, EST, LEVL V, 40-54 MIN: ICD-10-PCS | Mod: 95,25,, | Performed by: INTERNAL MEDICINE

## 2021-12-28 PROCEDURE — 1160F RVW MEDS BY RX/DR IN RCRD: CPT | Mod: CPTII,95,, | Performed by: INTERNAL MEDICINE

## 2021-12-28 PROCEDURE — 63600175 PHARM REV CODE 636 W HCPCS: Mod: JG | Performed by: INTERNAL MEDICINE

## 2021-12-28 RX ORDER — HEPARIN 100 UNIT/ML
500 SYRINGE INTRAVENOUS
Status: CANCELLED | OUTPATIENT
Start: 2021-12-28

## 2021-12-28 RX ORDER — HEPARIN 100 UNIT/ML
500 SYRINGE INTRAVENOUS
Status: DISCONTINUED | OUTPATIENT
Start: 2021-12-28 | End: 2021-12-28 | Stop reason: HOSPADM

## 2021-12-28 RX ORDER — EPINEPHRINE 0.3 MG/.3ML
0.3 INJECTION SUBCUTANEOUS ONCE AS NEEDED
Status: CANCELLED | OUTPATIENT
Start: 2021-12-28

## 2021-12-28 RX ORDER — BUPROPION HYDROCHLORIDE 150 MG/1
150 TABLET, EXTENDED RELEASE ORAL 2 TIMES DAILY
Qty: 60 TABLET | Refills: 0 | Status: SHIPPED | OUTPATIENT
Start: 2021-12-28 | End: 2022-12-08 | Stop reason: SDUPTHER

## 2021-12-28 RX ORDER — HEPARIN 100 UNIT/ML
SYRINGE INTRAVENOUS
Status: COMPLETED
Start: 2021-12-28 | End: 2021-12-28

## 2021-12-28 RX ORDER — SODIUM CHLORIDE 0.9 % (FLUSH) 0.9 %
10 SYRINGE (ML) INJECTION
Status: CANCELLED | OUTPATIENT
Start: 2021-12-28

## 2021-12-28 RX ORDER — DIPHENHYDRAMINE HYDROCHLORIDE 50 MG/ML
25 INJECTION INTRAMUSCULAR; INTRAVENOUS EVERY 10 MIN PRN
Status: DISCONTINUED | OUTPATIENT
Start: 2021-12-28 | End: 2021-12-28 | Stop reason: HOSPADM

## 2021-12-28 RX ORDER — METHYLPREDNISOLONE SOD SUCC 125 MG
125 VIAL (EA) INJECTION ONCE AS NEEDED
Status: DISCONTINUED | OUTPATIENT
Start: 2021-12-28 | End: 2021-12-28 | Stop reason: HOSPADM

## 2021-12-28 RX ORDER — METHYLPREDNISOLONE SOD SUCC 125 MG
125 VIAL (EA) INJECTION ONCE AS NEEDED
Status: CANCELLED | OUTPATIENT
Start: 2021-12-28

## 2021-12-28 RX ORDER — DIPHENHYDRAMINE HYDROCHLORIDE 50 MG/ML
25 INJECTION INTRAMUSCULAR; INTRAVENOUS EVERY 10 MIN PRN
Status: CANCELLED | OUTPATIENT
Start: 2021-12-28 | End: 2021-12-29

## 2021-12-28 RX ORDER — SODIUM CHLORIDE 9 MG/ML
1000 INJECTION, SOLUTION INTRAVENOUS
Status: COMPLETED | OUTPATIENT
Start: 2021-12-28 | End: 2021-12-28

## 2021-12-28 RX ORDER — EPINEPHRINE 1 MG/ML
0.3 INJECTION, SOLUTION INTRACARDIAC; INTRAMUSCULAR; INTRAVENOUS; SUBCUTANEOUS ONCE AS NEEDED
Status: DISCONTINUED | OUTPATIENT
Start: 2021-12-28 | End: 2021-12-28 | Stop reason: HOSPADM

## 2021-12-28 RX ORDER — SODIUM CHLORIDE 0.9 % (FLUSH) 0.9 %
10 SYRINGE (ML) INJECTION
Status: DISCONTINUED | OUTPATIENT
Start: 2021-12-28 | End: 2021-12-28 | Stop reason: HOSPADM

## 2021-12-28 RX ADMIN — BEVACIZUMAB 1300 MG: 400 INJECTION, SOLUTION INTRAVENOUS at 02:12

## 2021-12-28 RX ADMIN — HEPARIN 500 UNITS: 100 SYRINGE at 03:12

## 2021-12-28 RX ADMIN — SODIUM CHLORIDE 1000 ML: 0.9 INJECTION, SOLUTION INTRAVENOUS at 01:12

## 2022-01-06 ENCOUNTER — TELEPHONE (OUTPATIENT)
Dept: GYNECOLOGIC ONCOLOGY | Facility: CLINIC | Age: 52
End: 2022-01-06
Payer: MEDICAID

## 2022-01-06 ENCOUNTER — OFFICE VISIT (OUTPATIENT)
Dept: PALLIATIVE MEDICINE | Facility: CLINIC | Age: 52
End: 2022-01-06
Payer: MEDICAID

## 2022-01-06 VITALS
DIASTOLIC BLOOD PRESSURE: 85 MMHG | RESPIRATION RATE: 18 BRPM | WEIGHT: 196.56 LBS | HEART RATE: 84 BPM | BODY MASS INDEX: 30.85 KG/M2 | SYSTOLIC BLOOD PRESSURE: 137 MMHG | HEIGHT: 67 IN | TEMPERATURE: 98 F

## 2022-01-06 DIAGNOSIS — Z51.5 ENCOUNTER FOR PALLIATIVE CARE: Primary | ICD-10-CM

## 2022-01-06 DIAGNOSIS — C50.811 MALIGNANT NEOPLASM OF OVERLAPPING SITES OF RIGHT BREAST IN FEMALE, ESTROGEN RECEPTOR POSITIVE: ICD-10-CM

## 2022-01-06 DIAGNOSIS — G89.3 CANCER ASSOCIATED PAIN: ICD-10-CM

## 2022-01-06 DIAGNOSIS — C53.0 MALIGNANT NEOPLASM OF ENDOCERVIX: ICD-10-CM

## 2022-01-06 DIAGNOSIS — C53.0 MALIGNANT NEOPLASM OF ENDOCERVIX: Primary | ICD-10-CM

## 2022-01-06 DIAGNOSIS — C79.51 SECONDARY CANCER OF BONE: ICD-10-CM

## 2022-01-06 DIAGNOSIS — M25.569 KNEE PAIN, UNSPECIFIED CHRONICITY, UNSPECIFIED LATERALITY: ICD-10-CM

## 2022-01-06 DIAGNOSIS — Z17.0 MALIGNANT NEOPLASM OF OVERLAPPING SITES OF RIGHT BREAST IN FEMALE, ESTROGEN RECEPTOR POSITIVE: ICD-10-CM

## 2022-01-06 DIAGNOSIS — G89.3 NEOPLASM RELATED PAIN: ICD-10-CM

## 2022-01-06 PROCEDURE — 99215 OFFICE O/P EST HI 40 MIN: CPT | Mod: PBBFAC | Performed by: NURSE PRACTITIONER

## 2022-01-06 PROCEDURE — 3075F SYST BP GE 130 - 139MM HG: CPT | Mod: CPTII,,, | Performed by: NURSE PRACTITIONER

## 2022-01-06 PROCEDURE — 99999 PR PBB SHADOW E&M-EST. PATIENT-LVL V: ICD-10-PCS | Mod: PBBFAC,,, | Performed by: NURSE PRACTITIONER

## 2022-01-06 PROCEDURE — 1159F MED LIST DOCD IN RCRD: CPT | Mod: CPTII,,, | Performed by: NURSE PRACTITIONER

## 2022-01-06 PROCEDURE — 3079F PR MOST RECENT DIASTOLIC BLOOD PRESSURE 80-89 MM HG: ICD-10-PCS | Mod: CPTII,,, | Performed by: NURSE PRACTITIONER

## 2022-01-06 PROCEDURE — 99215 OFFICE O/P EST HI 40 MIN: CPT | Mod: S$PBB,,, | Performed by: NURSE PRACTITIONER

## 2022-01-06 PROCEDURE — 3075F PR MOST RECENT SYSTOLIC BLOOD PRESS GE 130-139MM HG: ICD-10-PCS | Mod: CPTII,,, | Performed by: NURSE PRACTITIONER

## 2022-01-06 PROCEDURE — 99999 PR PBB SHADOW E&M-EST. PATIENT-LVL V: CPT | Mod: PBBFAC,,, | Performed by: NURSE PRACTITIONER

## 2022-01-06 PROCEDURE — 3008F BODY MASS INDEX DOCD: CPT | Mod: CPTII,,, | Performed by: NURSE PRACTITIONER

## 2022-01-06 PROCEDURE — 1159F PR MEDICATION LIST DOCUMENTED IN MEDICAL RECORD: ICD-10-PCS | Mod: CPTII,,, | Performed by: NURSE PRACTITIONER

## 2022-01-06 PROCEDURE — 99215 PR OFFICE/OUTPT VISIT, EST, LEVL V, 40-54 MIN: ICD-10-PCS | Mod: S$PBB,,, | Performed by: NURSE PRACTITIONER

## 2022-01-06 PROCEDURE — 3008F PR BODY MASS INDEX (BMI) DOCUMENTED: ICD-10-PCS | Mod: CPTII,,, | Performed by: NURSE PRACTITIONER

## 2022-01-06 PROCEDURE — 3079F DIAST BP 80-89 MM HG: CPT | Mod: CPTII,,, | Performed by: NURSE PRACTITIONER

## 2022-01-06 NOTE — TELEPHONE ENCOUNTER
----- Message from Yousif Desouza MD sent at 1/6/2022  3:00 PM CST -----  Can we please get a PET/CT before she sees me?  The closer to her visit with me the better.  Also we can cancel her visit in February.  Thanks.

## 2022-01-06 NOTE — TELEPHONE ENCOUNTER
Spoke with our patient about her schedule appointment  she agreed she voiced understanding of the date, time and location. All questions answered appointment mail. MA/EDWIN /Preceptor Vladimir GUZMAN

## 2022-01-06 NOTE — PROGRESS NOTES
REFERRING PROVIDER  Estefani Asencio MD    REASON FOR CONSULT  Josey Flores  is a 51 y.o.  woman who presents for evaluation of CPS? stage IIIC1r grade ? squamous cell carcinoma of the cervix    HISTORY OF PRESENT ILLNESS    Please refer below for the patient's tumor history.  The interval history is as follows: +PO. -N/V. Ambulating.  -VB, VD, changes in her stool or urine.  -Abdominal pain.  +Pelvic pain that is suprapubic. -Hematuria. -Dysuria.  -Bloating. -CP, SOA, or coughing.    Oncology History   Malignant neoplasm of endocervix   9/10/2020 Imaging Significant Findings    CT A/P: Negative for ascites, omental caking, lymphadenopathy, or a gross cervical mass     12/7/2020 Biopsy    EMBX: SCC, p16+     1/12/2021 Imaging Significant Findings    MRI Pelvis w/ w/o  1. 4.9 cm cervical mass  2. R parametrial invasion  3. R external iliac lymphadenopathy     1/27/2021 Imaging Significant Findings    PET/CT: FDG avidity in the chest     2/18/2021 Biopsy    Endoscopic biopsy of the lung: +      2/22/2021 - 6/29/2021 Chemotherapy    Cisplatin/paclitaxel/avastin x6     3/12/2021 Genetic Testing    STRATA: FRANK, PIK3c, Ep300, ERBB3, KDM6A     4/24/2021 Imaging Significant Findings    CT Pelvis w/: JUAN FRANCISCO with a normal cervix     4/26/2021 Imaging Significant Findings    PET/CT: No FDG avidity     5/18/2021 Imaging Significant Findings    CT A/P w/: JUAN FRANCISCO     6/5/2021 Imaging Significant Findings    CT A/P w/: JUAN FRANCISCO     7/12/2021 Imaging Significant Findings    PET/CT: JUAN FRANCISCO     7/13/2021 - 12/28/2021 Maintenance Therapy    Avastin x 9     10/20/2021 Imaging Significant Findings    PET/CT: JUAN FRANCISCO     1/11/2022 Imaging Significant Findings    PET/CT: Increased FDG avidity in the cervix.  No other evidence of disease.     1/31/2022 -  Chemotherapy    Treatment Summary   Plan Name: OP CARBOPLATIN PACLITAXEL PEMBROLIZUMAB Q3W FOLLOWED BY MAINTENANCE PEMBROLIZUMAB 400 MG Q6W  Treatment Goal: Control  Status: Active  Start Date:  1/31/2022 (Planned)  End Date: 8/25/2025 (Planned)  Provider: Estefani Asencio MD  Chemotherapy: dexAMETHasone (DECADRON) 4 MG Tab, 8 mg, Oral, Daily, 0 of 1 cycle, Start date: --, End date: --  OLANZapine (ZYPREXA) 5 MG tablet, 5 mg, Oral, Nightly, 0 of 1 cycle, Start date: --, End date: --  CARBOplatin (PARAPLATIN) 445 mg in sodium chloride 0.9% 500 mL chemo infusion, 445 mg, Intravenous, Clinic/HOD 1 time, 0 of 6 cycles  PACLitaxeL (TAXOL) 175 mg/m2 = 366 mg in sodium chloride 0.9% 500 mL chemo infusion, 175 mg/m2 = 366 mg (original dose ), Intravenous, Clinic/HOD 1 time, 0 of 6 cycles  Dose modification: 175 mg/m2 (Cycle 1)  pembrolizumab (KEYTRUDA) 200 mg in sodium chloride 0.9% 108 mL infusion, 200 mg, Intravenous, Clinic/HOD 1 time, 0 of 35 cycles          REVIEW OF SYSTEMS  All systems reviewed and negative except as noted in HPI.    OBJECTIVE   Vitals:    01/19/22 1445   BP: (!) 133/91   Pulse: 92      Body mass index is 31.77 kg/m².   1. General: Well appearing, no apparent distress, alert and oriented.  2. Lymph: Neck symmetric without cervical or supraclavicular adenopathy or mass.  3. Lungs: Normal respiratory rate, no accessory muscle use.  4. Psych: Normal affect.  5. Abdomen:  non-distended, soft, non-tender, are no masses, no ascites, no hepatosplenomegaly.  6. Skin: Warm, dry, no rashes or lesions.   7. Extremities: Bilateral lower extremities without edema or tenderness.  8. Genitourinary               Pelvic Examination including:                a. External genitalia are normal in appearance. No lesions noted.               b. Urethral meatus is normal size, location, and appearance.               c. Urethra is negative.               d. Bladder is nontender. No masses noted.               e. Vagina has normal mucosa with physiologic discharge. No lesions noted. Cervix is flushed to the vagina.  It is normal size without visible lesions.  Smooth to palpation.  Normal parametria              f.  Uterus normal              g. Adnexa normal   h. Rectum normal        ECOG status: 2    LABORATORY DATA  Lab data reviewed.    RADIOLOGICAL DATA  Radiology data reviewed.    ASSESSMENT / PLAN     1. Malignant neoplasm of endocervix    2. Papillary thyroid carcinoma    3. Malignant neoplasm of overlapping sites of right breast in female, estrogen receptor positive    4. Obesity (BMI 30-39.9)    5. Chronic obstructive pulmonary disease, unspecified COPD type    6. Tobacco abuse       F/U CPS score (requested 1/19/21)  F/U [facility] about platinum-based combination therapy with pembrolizumab vs single agent pembrolizumab  F/U after completion of 3 cycles of CT      PATIENT EDUCATION  Ready to learn, no apparent learning barriers were identified; learning preferences include listening. Explained diagnosis and treatment plan; patient expressed understanding of the content.    ADMINISTRATIVE BILLING  Greater than 50% of was spent in counseling.       Yousif Desouza

## 2022-01-07 ENCOUNTER — PATIENT MESSAGE (OUTPATIENT)
Dept: PHARMACY | Facility: CLINIC | Age: 52
End: 2022-01-07
Payer: MEDICAID

## 2022-01-10 ENCOUNTER — TELEPHONE (OUTPATIENT)
Dept: GYNECOLOGIC ONCOLOGY | Facility: CLINIC | Age: 52
End: 2022-01-10
Payer: MEDICAID

## 2022-01-10 ENCOUNTER — SPECIALTY PHARMACY (OUTPATIENT)
Dept: PHARMACY | Facility: CLINIC | Age: 52
End: 2022-01-10
Payer: MEDICAID

## 2022-01-10 DIAGNOSIS — C50.811 MALIGNANT NEOPLASM OF OVERLAPPING SITES OF RIGHT BREAST IN FEMALE, ESTROGEN RECEPTOR POSITIVE: ICD-10-CM

## 2022-01-10 DIAGNOSIS — Z17.0 MALIGNANT NEOPLASM OF OVERLAPPING SITES OF RIGHT BREAST IN FEMALE, ESTROGEN RECEPTOR POSITIVE: ICD-10-CM

## 2022-01-11 ENCOUNTER — HOSPITAL ENCOUNTER (OUTPATIENT)
Dept: RADIOLOGY | Facility: HOSPITAL | Age: 52
Discharge: HOME | End: 2022-01-11
Attending: OBSTETRICS & GYNECOLOGY
Payer: MEDICAID

## 2022-01-11 DIAGNOSIS — C53.0 MALIGNANT NEOPLASM OF ENDOCERVIX: ICD-10-CM

## 2022-01-11 PROCEDURE — 78815 PET IMAGE W/CT SKULL-THIGH: CPT | Mod: TC

## 2022-01-11 PROCEDURE — 78815 NM PET CT ROUTINE: ICD-10-PCS | Mod: 26,PS,, | Performed by: RADIOLOGY

## 2022-01-11 PROCEDURE — 78815 PET IMAGE W/CT SKULL-THIGH: CPT | Mod: 26,PS,, | Performed by: RADIOLOGY

## 2022-01-11 NOTE — TELEPHONE ENCOUNTER
"Subjective   Cassius Alvarado is a 59 y.o. male.     Chief Complaint   Patient presents with   • Follow-up   • Diabetes   • Hypertension       Visit Vitals  /58   Pulse 65   Temp 98.4 °F (36.9 °C) (Temporal Artery )   Resp 16   Ht 188 cm (74.02\")   Wt 109 kg (240 lb 3.2 oz)   SpO2 98%   BMI 30.83 kg/m²         Diabetes   He presents for his follow-up diabetic visit. He has type 2 diabetes mellitus. His disease course has been stable. There are no hypoglycemic associated symptoms. Pertinent negatives for hypoglycemia include no dizziness, headaches, nervousness/anxiousness or sweats. Associated symptoms include chest pain (muscle pain after building hoop house). Pertinent negatives for diabetes include no blurred vision, no fatigue, no foot paresthesias, no foot ulcerations, no polydipsia, no polyphagia, no polyuria, no visual change, no weakness and no weight loss. There are no hypoglycemic complications. Symptoms are stable. Diabetic complications include impotence and nephropathy. Pertinent negatives for diabetic complications include no autonomic neuropathy, CVA, heart disease, peripheral neuropathy, PVD or retinopathy. Current diabetic treatment includes insulin pump. He is compliant with treatment most of the time. His weight is stable. He is following a diabetic and generally healthy diet. Meal planning includes avoidance of concentrated sweets and carbohydrate counting. He has had a previous visit with a dietitian. He participates in exercise daily. His home blood glucose trend is decreasing steadily. His breakfast blood glucose range is generally  mg/dl. His bedtime blood glucose range is generally >200 mg/dl. An ACE inhibitor/angiotensin II receptor blocker is being taken. He does not see a podiatrist.Eye exam is current.   Hypertension   This is a chronic problem. The current episode started more than 1 year ago. The problem is unchanged. The problem is controlled. Associated symptoms include " Specialty Pharmacy - Refill Coordination    Specialty Medication Orders Linked to Encounter    Flowsheet Row Most Recent Value   Medication #1 letrozole (FEMARA) 2.5 mg Tab (Order#[id number], Rx#[id number])   Medication #2 palbociclib (IBRANCE) 125 mg Cap (Order#[id number], Rx#[id number])          Refill Questions - Documented Responses    Flowsheet Row Most Recent Value   Patient Availability and HIPAA Verification    Does patient want to proceed with activity? Yes   HIPAA/medical authority confirmed? Yes   Relationship to patient of person spoken to? Self   Refill Screening Questions    Changes to allergies? No   Changes to medications? No   New conditions since last clinic visit? No   Unplanned office visit, urgent care, ED, or hospital admission in the last 4 weeks? No   How does patient/caregiver feel medication is working? Good   Financial problems or insurance changes? No   How many doses of your specialty medications were missed in the last 4 weeks? 0   Would patient like to speak to a pharmacist? No   When does the patient need to receive the medication? 01/14/22   Refill Delivery Questions    How will the patient receive the medication? Delivery Pauly   When does the patient need to receive the medication? 01/14/22   Shipping Address Home   Address in [facility] confirmed and updated if neccessary? Yes   Expected Copay ($) 0   Is the patient able to afford the medication copay? Yes   Payment Method zero copay   Days supply of Refill 28   Supplies needed? No supplies needed   Refill activity completed? Yes   Refill activity plan Refill scheduled   Shipment/Pickup Date: 01/12/22          Current Outpatient Medications   Medication Sig    albuterol (PROVENTIL) 2.5 mg /3 mL (0.083 %) nebulizer solution Take 3 mLs (2.5 mg total) by nebulization every 4 to 6 hours as needed for Wheezing or Shortness of Breath.    albuterol (PROVENTIL/VENTOLIN HFA) 90 mcg/actuation inhaler Inhale 2 puffs into the lungs  chest pain (muscle pain after building hoop house). Pertinent negatives include no anxiety, blurred vision, headaches, malaise/fatigue, neck pain, orthopnea, palpitations, peripheral edema, PND, shortness of breath or sweats. There are no associated agents to hypertension. Risk factors for coronary artery disease include diabetes mellitus, dyslipidemia, family history and male gender. Current antihypertension treatment includes ACE inhibitors, diuretics and beta blockers. The current treatment provides significant improvement. There are no compliance problems.  Hypertensive end-organ damage includes kidney disease. There is no history of angina, CAD/MI, CVA, heart failure, left ventricular hypertrophy, PVD or retinopathy. Identifiable causes of hypertension include chronic renal disease. There is no history of sleep apnea or a thyroid problem.   Hyperlipidemia   This is a chronic problem. The current episode started more than 1 year ago. The problem is controlled. Recent lipid tests were reviewed and are normal. Exacerbating diseases include chronic renal disease and diabetes. He has no history of hypothyroidism, liver disease, obesity or nephrotic syndrome. Associated symptoms include chest pain (muscle pain after building hoop house). Pertinent negatives include no focal sensory loss, focal weakness, leg pain, myalgias or shortness of breath. Current antihyperlipidemic treatment includes statins. The current treatment provides significant improvement of lipids. There are no compliance problems.  Risk factors for coronary artery disease include dyslipidemia, hypertension, male sex and diabetes mellitus.      Pt has had HGA1c 7.4 with Dr Brock in March. Pt's sugar has been less than 100 the past 3 weeks.   Pt is still seeing Dr Brock for DM.   Shingles has abated. Pt has seen Dr Larkin who checked his hearing and said that he had some drop outs in hearing.  Pt saw Dr Stoner Nephrology who thought that pt was  every 4 (four) hours as needed for Wheezing or Shortness of Breath.    ALPRAZolam (XANAX) 1 MG tablet Take 0.5-1 tablets (0.5-1 mg total) by mouth 2 (two) times daily as needed for Anxiety.    buPROPion (WELLBUTRIN SR) 150 MG TBSR 12 hr tablet Take 1 tablet (150 mg total) by mouth 2 (two) times daily. Take 1 tablet (150mg) once daily for 1 week then increase to twice daily    calcium carbonate 400 mg calcium (1,000 mg) Chew Take 2,000 mg by mouth once daily.    cyanocobalamin (VITAMIN B-12) 1000 MCG tablet Take 1 tablet (1,000 mcg total) by mouth once daily.    diphenoxylate-atropine 2.5-0.025 mg (LOMOTIL) 2.5-0.025 mg per tablet Take 1 tablet by mouth 4 (four) times daily as needed for Diarrhea.    ergocalciferol (VITAMIN D2) 50,000 unit Cap Take 5,000 Units by mouth once daily at 6am.     HYDROcodone-acetaminophen (NORCO)  mg per tablet Take 1 tablet by mouth every 4 (four) hours as needed for Pain. No more than 4 doses/ day    ipratropium (ATROVENT) 42 mcg (0.06 %) nasal spray 2 sprays by Nasal route 4 (four) times daily. As needed for rhinitis. Take in evening before bed and in the morning    letrozole (FEMARA) 2.5 mg Tab Take 1 tablet (2.5 mg) by mouth once daily.    levoFLOXacin (LEVAQUIN) 500 MG tablet Take 1 tablet (500 mg total) by mouth once daily.    levothyroxine (EUTHYROX) 175 MCG tablet Take 1 tablet (175 mcg total) by mouth once daily.    magnesium oxide (MAGOX) 400 mg (241.3 mg magnesium) tablet Take 1 tablet (400 mg total) by mouth once daily.    morphine (MS CONTIN) 15 MG 12 hr tablet Take 1 tablet (15 mg total) by mouth every 12 (twelve) hours.    multivitamin (THERAGRAN) per tablet Take 1 tablet by mouth once daily.    OLANZapine (ZYPREXA) 5 MG tablet Take 1 tablet (5 mg total) by mouth nightly. Take on evenings of chemotherapy days 1, 2, 3 and 4    palbociclib (IBRANCE) 125 mg Cap Take one capsule (125 mg) by mouth once daily on days 1-21 of each 28-day cycle.    potassium  chloride SA (K-DUR,KLOR-CON) 20 MEQ tablet Take 1 tablet (20 mEq total) by mouth once daily. Take daily for 3 days.    predniSONE (DELTASONE) 20 MG tablet Prednisone 60 mg/ day for 3 days, 40 mg/day for 3 days,20 mg/ day for 3 days, (1/2 tablet )10 mg a day for 3 days.    prochlorperazine (COMPAZINE) 5 MG tablet Take 1 tablet (5 mg total) by mouth every 6 (six) hours as needed for Nausea.   Last reviewed on 1/6/2022  1:05 PM by Antonieta Sanches LPN    Review of patient's allergies indicates:   Allergen Reactions    Gabapentin Swelling     Note: unilateral joint edema, unclear if due to gabapentin    Nsaids (non-steroidal anti-inflammatory drug) Other (See Comments)     Instructed not to take NSAID drugs with chemo pill.    Clindamycin Rash    Vancomycin analogues Itching    Last reviewed on  1/6/2022 1:04 PM by Antonieta Sanches      Tasks added this encounter   2/4/2022 - Refill Call (Auto Added)  2/4/2022 - Refill Call (Auto Added)   Tasks due within next 3 months   3/2/2022 - Clinical - Follow Up Assesement (90 day)     [facility]  [facility] [address]  [facility] [address]  Phone: [phone]  Fax: [phone]       stable.     The following portions of the patient's history were reviewed and updated as appropriate: allergies, current medications, past family history, past medical history, past social history, past surgical history and problem list.    Past Medical History:   Diagnosis Date   • Arthritis     knees and gets injection by Dr Palacios   • Chronic kidney disease    • Diabetes mellitus 1985   • History of adenomatous polyp of colon 09/22/2016   • History of vitrectomy     left   • Hyperlipidemia    • Hypertension    • Kidney stone    • Kidney stones    • Retinal detachment, left    • Stage 3 chronic kidney disease 10/25/2017      Past Surgical History:   Procedure Laterality Date   • BICEPS TENDON REPAIR Right 2014   • BICEPS TENDON REPAIR Right 2015   • CARPAL TUNNEL RELEASE Bilateral 2013   • CHEST WALL MASS EXCISION Right 1985    benign   • COLONOSCOPY W/ POLYPECTOMY  09/22/2016   • EYE SURGERY     • PLANTAR FASCIA SURGERY Right 1998   • RETINAL DETACHMENT SURGERY Left 2012   • SHOULDER ARTHROTOMY Right 1994    rotator cuff joint   • SHOULDER SURGERY Left 2004    labrum repair   • SPINE SURGERY  1969    fibrosacroma on back, incomplete   • THORACIC SPINE SURGERY Right 1970    fibrosarcoma resection at [facility]   • TONSILLECTOMY      age 3   • TRIGGER FINGER RELEASE Left 2006    middle   • URETEROLITHOTOMY  2003   • VASECTOMY  11/1995   • VITRECTOMY Left 2001      Family History   Problem Relation Age of Onset   • COPD Mother    • Heart disease Father         MI age 70      Social History     Social History   • Marital status:      Spouse name: N/A   • Number of children: N/A   • Years of education: N/A     Occupational History   • Not on file.     Social History Main Topics   • Smoking status: Never Smoker   • Smokeless tobacco: Never Used   • Alcohol use No   • Drug use: No   • Sexual activity: Not on file      Comment:      Other Topics Concern   • Not on file     Social History Narrative   • No  narrative on file        Review of Systems   Constitutional: Negative.  Negative for chills, diaphoresis, fatigue, fever, malaise/fatigue and weight loss.   HENT: Negative.  Negative for ear pain, nosebleeds, postnasal drip, rhinorrhea, sinus pressure, sneezing and sore throat.    Eyes: Negative.  Negative for blurred vision, redness and itching.   Respiratory: Negative.  Negative for cough, shortness of breath and wheezing.    Cardiovascular: Positive for chest pain (muscle pain after building hoop house). Negative for palpitations, orthopnea and PND.   Gastrointestinal: Negative.  Negative for abdominal pain, constipation, diarrhea, nausea and vomiting.   Endocrine: Negative.  Negative for cold intolerance, heat intolerance, polydipsia, polyphagia and polyuria.   Genitourinary: Positive for impotence. Negative for dysuria, frequency, hematuria and urgency.   Musculoskeletal: Negative.  Negative for arthralgias, back pain, myalgias and neck pain.   Skin: Negative.  Negative for color change and rash.   Allergic/Immunologic: Positive for environmental allergies.   Neurological: Negative.  Negative for dizziness, focal weakness, syncope, weakness, light-headedness and headaches.   Hematological: Negative.  Negative for adenopathy. Does not bruise/bleed easily.   Psychiatric/Behavioral: Negative.  Negative for dysphoric mood. The patient is not nervous/anxious.        Objective   Physical Exam   Constitutional: He is oriented to person, place, and time. He appears well-developed.   HENT:   Head: Normocephalic.   Right Ear: External ear normal.   Left Ear: External ear normal.   Nose: Nose normal.   Eyes: Conjunctivae, EOM and lids are normal. Pupils are equal, round, and reactive to light.   Neck: Trachea normal and normal range of motion. Neck supple. Carotid bruit is not present. No thyroid mass and no thyromegaly present.   Cardiovascular: Normal rate and regular rhythm.    No murmur heard.  Pulmonary/Chest: Effort  normal and breath sounds normal. No respiratory distress. He has no decreased breath sounds. He has no wheezes. He has no rhonchi. He has no rales. He exhibits no tenderness.   Abdominal: Soft. Bowel sounds are normal. There is no tenderness.   Musculoskeletal: Normal range of motion.   Neurological: He is alert and oriented to person, place, and time.   Skin: Skin is warm and dry.   Psychiatric: He has a normal mood and affect. His behavior is normal.   Nursing note and vitals reviewed.      Assessment/Plan   Cassius was seen today for follow-up, diabetes and hypertension.    Diagnoses and all orders for this visit:    Essential hypertension  -     Comprehensive Metabolic Panel  -     CBC & Differential  -     TSH    Hyperlipidemia, unspecified hyperlipidemia type  -     Comprehensive Metabolic Panel  -     Lipid Panel    Diabetes mellitus type 2, insulin dependent  -     Microalbumin / Creatinine Urine Ratio - Urine, Clean Catch    Stage 3 chronic kidney disease  -     Comprehensive Metabolic Panel  -     Phosphorus    Screening for malignant neoplasm of prostate  -     PSA Screen                   Current Outpatient Prescriptions:   •  Aspirin (ASPIR-81 PO), Take  by mouth., Disp: , Rfl:   •  Calcium Carbonate Antacid 1250 MG/5ML, Take  by mouth., Disp: , Rfl:   •  carvedilol (COREG) 6.25 MG tablet, Take 1 tablet by mouth 2 (Two) Times a Day., Disp: , Rfl:   •  Cholecalciferol (VITAMIN D3) 5000 units capsule capsule, Take 5,000 Units by mouth Daily., Disp: , Rfl:   •  insulin lispro (humaLOG) 100 UNIT/ML injection, Inject  under the skin Continuous. 1.2 units basal with 1 unit per 4 gm carb meal bolus, Disp: , Rfl:   •  lisinopril-hydrochlorothiazide (PRINZIDE,ZESTORETIC) 10-12.5 MG per tablet, Take 1 tablet by mouth Daily., Disp: , Rfl:   •  Multiple Vitamins-Minerals (CENTRUM ADULTS PO), Take  by mouth., Disp: , Rfl:   •  Omega-3 Fatty Acids (FISH OIL) 1200 MG capsule capsule, Take 2,400 mg by mouth 2 (Two)  Times a Day With Meals., Disp: , Rfl:   •  rosuvastatin (CRESTOR) 10 MG tablet, Take 1 tablet by mouth Daily., Disp: , Rfl:   •  vitamin E 1000 UNIT capsule, Take 1,000 Units by mouth Daily., Disp: , Rfl:     Return in about 3 months (around 8/21/2018), or if symptoms worsen or fail to improve, for Recheck.

## 2022-01-12 ENCOUNTER — TELEPHONE (OUTPATIENT)
Dept: GYNECOLOGIC ONCOLOGY | Facility: CLINIC | Age: 52
End: 2022-01-12
Payer: MEDICAID

## 2022-01-12 NOTE — TELEPHONE ENCOUNTER
Spoke with our patient about her audio schedule appointment in gyn oncology she agreed she voiced understanding of the date, time and location. All questions answered . MA/EDWIN /Preceptor Vladimir GUZMAN  Please [phone]

## 2022-01-12 NOTE — TELEPHONE ENCOUNTER
----- Message from Yousif Desouza MD sent at 1/12/2022  9:54 AM CST -----  Can we do an AV tomorrow to dicuss treatment?  Thanks.

## 2022-01-12 NOTE — PROGRESS NOTES
Established Patient - Audio Only Telehealth Visit     The patient location is: home  The chief complaint leading to consultation is: recurrent cervical cancer  Visit type: Virtual visit with audio only (telephone)  Total time spent with patient: 30       The reason for the audio only service rather than synchronous audio and video virtual visit was related to technical difficulties or patient preference/necessity.     Each patient to whom I provide medical services by telemedicine is:  (1) informed of the relationship between the physician and patient and the respective role of any other health care provider with respect to management of the patient; and (2) notified that they may decline to receive medical services by telemedicine and may withdraw from such care at any time. Patient verbally consented to receive this service via voice-only telephone call.       This service was not originating from a related E/M service provided within the previous 7 days nor will  to an E/M service or procedure within the next 24 hours or my soonest available appointment.  Prevailing standard of care was able to be met in this audio-only visit.      REFERRING PROVIDER  Estefani Asencio MD    REASON FOR CONSULT  Josey Flores  is a 51 y.o.  woman who presents for evaluation of CPS? (PD-L1+) recurrent grade ? squamous cell carcinoma of the cervix    HISTORY OF PRESENT ILLNESS    Please refer below for the patient's tumor history.  The interval history is as follows: +PO. -N/V. Ambulating.  -VB, VD, changes in her stool or urine. -Pelvic pain or abdominal pain. -Bloating. -CP, SOA, or coughing.    Oncology History   Malignant neoplasm of endocervix   9/10/2020 Imaging Significant Findings    CT A/P: Negative for ascites, omental caking, lymphadenopathy, or a gross cervical mass     12/7/2020 Biopsy    EMBX: SCC, p16+     1/12/2021 Imaging Significant Findings    MRI Pelvis w/ w/o  1. 4.9 cm cervical mass  2. R parametrial  invasion  3. R external iliac lymphadenopathy     1/27/2021 Imaging Significant Findings    PET/CT: FDG avidity in the chest     2/18/2021 Biopsy    Endoscopic biopsy of the lung: +      2/22/2021 - 6/29/2021 Chemotherapy    Cisplatin/paclitaxel/avastin x6     3/12/2021 Genetic Testing    STRATA: FRANK, PIK3c, Ep300, ERBB3, KDM6A     4/24/2021 Imaging Significant Findings    CT Pelvis w/: JUAN FRANCISCO with a normal cervix     4/26/2021 Imaging Significant Findings    PET/CT: No FDG avidity     5/18/2021 Imaging Significant Findings    CT A/P w/: JUAN FRANCISCO     6/5/2021 Imaging Significant Findings    CT A/P w/: JUAN FRANCISCO     7/12/2021 Imaging Significant Findings    PET/CT: JUAN FRANCISCO     7/13/2021 - 12/28/2021 Maintenance Therapy    Avastin x 9     10/20/2021 Imaging Significant Findings    PET/CT: JUAN FRANCISCO     1/11/2022 Imaging Significant Findings    PET/CT: Increased FDG avidity in the cervix.  No other evidence of disease.          REVIEW OF SYSTEMS  All systems reviewed and negative except as noted in HPI.    OBJECTIVE   There were no vitals filed for this visit.   There is no height or weight on file to calculate BMI.     ECOG status: 2    LABORATORY DATA  Lab data reviewed.    RADIOLOGICAL DATA  Radiology data reviewed.    ASSESSMENT / PLAN     1. Malignant neoplasm of endocervix    2. Papillary thyroid carcinoma    3. Obesity (BMI 30-39.9)    4. Chronic obstructive pulmonary disease, unspecified COPD type    5. Tobacco abuse       I reviewed her PET/CTs.  In retrospect, it looks like she did have some FDG avidity in the cervix but now it is significantly higher.  This picture to me is most consistent with recurrent cervical cancer.  I do not think a biopsy is feasible (I will examine her next week) and is not warranted for treatment.  There is no high-level evidence to support definitive chemoradiotherapy.  There is also no high-level evidence to support surgery.  Although both options are tempting due to her limited disease burden, neither  are the standard of care.  She is PD-L1 + on STRATA, but we don't have a CPS score.  My recommendation is restart carboplatin AUC5/paclitaxel 135mg/m2/pembrolizumab 200 mg IV per the new KEYNOTE study (assuming she qualifies) with re-imaging every 3 cycles.  I don't think there's any benefit to resuming Avastin if she has progressed on it.  She can be treated with carboplatin given previous exposure to cisplatin and a JCOG sub-analysis.  I know she had some toxicities before, and I will defer the details of treatment to Dr. Asencio.  All questions were answered.  She voiced understanding.  RTC after completion of 3 cycles of therapy and a repeat PET/CT.  She also needs to meet with Palliative Care.  We had a very rosa discussion about her disease status.  She understands that her disease is incurable. I will see her next week for a physical exam.      PATIENT EDUCATION  Ready to learn, no apparent learning barriers were identified; learning preferences include listening. Explained diagnosis and treatment plan; patient expressed understanding of the content.    ADMINISTRATIVE BILLING  Greater than 50% of was spent in counseling.       Yousif Desouza

## 2022-01-13 ENCOUNTER — OFFICE VISIT (OUTPATIENT)
Dept: GYNECOLOGIC ONCOLOGY | Facility: CLINIC | Age: 52
End: 2022-01-13
Payer: MEDICAID

## 2022-01-13 ENCOUNTER — PATIENT MESSAGE (OUTPATIENT)
Dept: ORTHOPEDICS | Facility: CLINIC | Age: 52
End: 2022-01-13
Payer: MEDICAID

## 2022-01-13 ENCOUNTER — TELEPHONE (OUTPATIENT)
Dept: ORTHOPEDICS | Facility: CLINIC | Age: 52
End: 2022-01-13
Payer: MEDICAID

## 2022-01-13 DIAGNOSIS — C53.0 MALIGNANT NEOPLASM OF ENDOCERVIX: Primary | ICD-10-CM

## 2022-01-13 DIAGNOSIS — C73 PAPILLARY THYROID CARCINOMA: ICD-10-CM

## 2022-01-13 DIAGNOSIS — J44.9 CHRONIC OBSTRUCTIVE PULMONARY DISEASE, UNSPECIFIED COPD TYPE: ICD-10-CM

## 2022-01-13 DIAGNOSIS — Z72.0 TOBACCO ABUSE: ICD-10-CM

## 2022-01-13 DIAGNOSIS — E66.9 OBESITY (BMI 30-39.9): ICD-10-CM

## 2022-01-13 PROCEDURE — 99215 OFFICE O/P EST HI 40 MIN: CPT | Mod: 95,,, | Performed by: OBSTETRICS & GYNECOLOGY

## 2022-01-13 PROCEDURE — 99215 PR OFFICE/OUTPT VISIT, EST, LEVL V, 40-54 MIN: ICD-10-PCS | Mod: 95,,, | Performed by: OBSTETRICS & GYNECOLOGY

## 2022-01-13 NOTE — TELEPHONE ENCOUNTER
Left vm regarding patient's ortho referral and scheduling apt. Left call back phone number to call back to schedule an appointment.     doris aguillong sent regarding ortho referral

## 2022-01-17 NOTE — PROGRESS NOTES
Subjective:      DATE OF VISIT: 1/18/2022   ?   Patient ID:?Josey Flores is a 51 y.o. female.?? MR#: [id number]   ?   PRIMARY PROVIDER: Dr. Asencio  ?   CHIEF COMPLAINT:  Follow-up  ?   ONCOLOGIC DIAGNOSIS:      Stage IV cervical squamous cell carcinoma    stage IV, T2 N1b M1, invasive breast carcinoma, ER/N[place]+, HER2-    Papillary thyroid carcinoma status post total thyroidectomy on 08/31/2017, mpT1b N0     Cervical HSIL MIREILLE 3 s/p LEEP, 2017  ?   CURRENT TREATMENT:    Palliative chemotherapy: cisplatin, paclitaxel and bevacizumab; 02/22/2021 cycle 1 day 1 -> bevacizumab maintenance after 6 cycles  Letrozole, pending  re-initiation palbociclib due to ENT infection, see below     INTERVAL EVENTS    She is feeling at her baseline.  She does note occasional pelvic pressure and dysuria.  No hematuria or vaginal bleeding.  Recent virtual visit with Gynecologic Oncology in which she was told of PET scan results.  We review imaging together in clinic today with her  and sister.    ROS  A comprehensive 14-point review of systems was reviewed with patient and was negative other than as specified above.   ?     Objective:      Physical Exam        Vitals:    01/18/22 1353   BP: 124/83   Pulse: 95   Temp: 98.1 °F (36.7 °C)        ECOG:?0   General appearance: Generally well appearing, in no acute distress.   Head, eyes, ears, nose, and throat: moist mucous membranes.   Respiratory:  Normal work of breathing  Abdomen: nontender, nondistended.   Extremities: Warm, without edema.   Neurologic: Alert and oriented. Grossly normal strength, coordination, and gait.   Skin: No rashes, ecchymoses or petechial lesion.   Psychiatric:  Normal mood and affect.      Laboratory:  ?   Lab Visit on 01/18/2022   Component Date Value Ref Range Status    Specimen UA 01/18/2022 Urine, Clean Catch   Final    Color, UA 01/18/2022 Yellow  Yellow, Straw, [place] Final    Appearance, UA 01/18/2022 Clear  Clear Final    pH, UA 01/18/2022 6.0   5.0 - 8.0 Final    Specific Gravity, UA 01/18/2022 1.025  1.005 - 1.030 Final    Protein, UA 01/18/2022 Trace* Negative Final    Glucose, UA 01/18/2022 Negative  Negative Final    Ketones, UA 01/18/2022 Negative  Negative Final    Bilirubin (UA) 01/18/2022 Negative  Negative Final    Occult Blood UA 01/18/2022 Trace* Negative Final    Nitrite, UA 01/18/2022 Negative  Negative Final    Urobilinogen, UA 01/18/2022 Negative  <2.0 EU/dL Final    Leukocytes, UA 01/18/2022 Negative  Negative Final   Lab Visit on 01/18/2022   Component Date Value Ref Range Status    WBC 01/18/2022 7.04  3.90 - 12.70 K/uL Final    RBC 01/18/2022 3.83* 4.00 - 5.40 M/uL Final    Hemoglobin 01/18/2022 13.0  12.0 - 16.0 g/dL Final    Hematocrit 01/18/2022 39.1  37.0 - 48.5 % Final    MCV 01/18/2022 102* 82 - 98 fL Final    MCH 01/18/2022 33.9* 27.0 - 31.0 pg Final    MCHC 01/18/2022 33.2  32.0 - 36.0 g/dL Final    RDW 01/18/2022 18.1* 11.5 - 14.5 % Final    Platelets 01/18/2022 215  150 - 450 K/uL Final    MPV 01/18/2022 10.3  9.2 - 12.9 fL Final    Immature Granulocytes 01/18/2022 4.4* 0.0 - 0.5 % Final    Gran # (ANC) 01/18/2022 3.7  1.8 - 7.7 K/uL Final    Immature Grans (Abs) 01/18/2022 0.31* 0.00 - 0.04 K/uL Final    Lymph # 01/18/2022 2.0  1.0 - 4.8 K/uL Final    Mono # 01/18/2022 0.7  0.3 - 1.0 K/uL Final    Eos # 01/18/2022 0.2  0.0 - 0.5 K/uL Final    Baso # 01/18/2022 0.12  0.00 - 0.20 K/uL Final    nRBC 01/18/2022 0  0 /100 WBC Final    Gran % 01/18/2022 52.8  38.0 - 73.0 % Final    Lymph % 01/18/2022 28.8  18.0 - 48.0 % Final    Mono % 01/18/2022 9.5  4.0 - 15.0 % Final    Eosinophil % 01/18/2022 2.8  0.0 - 8.0 % Final    Basophil % 01/18/2022 1.7  0.0 - 1.9 % Final    Differential Method 01/18/2022 Automated   Final    Sodium 01/18/2022 138  136 - 145 mmol/L Final    Potassium 01/18/2022 4.3  3.5 - 5.1 mmol/L Final    Chloride 01/18/2022 101  95 - 110 mmol/L Final    CO2 01/18/2022 25  23 - 29  mmol/L Final    Glucose 01/18/2022 117* 70 - 110 mg/dL Final    BUN 01/18/2022 13  6 - 20 mg/dL Final    Creatinine 01/18/2022 1.5* 0.5 - 1.4 mg/dL Final    Calcium 01/18/2022 9.4  8.7 - 10.5 mg/dL Final    Total Protein 01/18/2022 7.0  6.0 - 8.4 g/dL Final    Albumin 01/18/2022 3.9  3.5 - 5.2 g/dL Final    Total Bilirubin 01/18/2022 0.4  0.1 - 1.0 mg/dL Final    Alkaline Phosphatase 01/18/2022 59  55 - 135 U/L Final    AST 01/18/2022 16  10 - 40 U/L Final    ALT 01/18/2022 15  10 - 44 U/L Final    Anion Gap 01/18/2022 12  8 - 16 mmol/L Final    eGFR if  01/18/2022 46* >60 mL/min/1.73 m^2 Final    eGFR if non  01/18/2022 39* >60 mL/min/1.73 m^2 Final      Lab Results   Component Value Date    WBC 7.04 01/18/2022    HGB 13.0 01/18/2022    HCT 39.1 01/18/2022     (H) 01/18/2022     01/18/2022         Chemistry        Component Value Date/Time     01/18/2022 1354    K 4.3 01/18/2022 1354     01/18/2022 1354    CO2 25 01/18/2022 1354    BUN 13 01/18/2022 1354    CREATININE 1.5 (H) 01/18/2022 1354     (H) 01/18/2022 1354        Component Value Date/Time    CALCIUM 9.4 01/18/2022 1354    ALKPHOS 59 01/18/2022 1354    AST 16 01/18/2022 1354    ALT 15 01/18/2022 1354    BILITOT 0.4 01/18/2022 1354    ESTGFRAFRICA 46 (A) 01/18/2022 1354    EGFRNONAA 39 (A) 01/18/2022 1354          ? IMAGING   Results for orders placed or performed during the hospital encounter of 01/11/22 (from the past 2160 hour(s))   NM PET CT Routine Skull to Mid Thigh    Impression    1. Significant uptake seen in the region of the cervix, consistent with recurrent/residual disease.  2. Remaining findings as discussed above and stable when compared to the prior study.      Electronically signed by: Bello Agee DO  Date:    01/11/2022  Time:    16:40     *Note: Due to a large number of results and/or encounters for the requested time period, some results have not been  displayed. A complete set of results can be found in Results Review.     No results found. However, due to the size of the patient record, not all encounters were searched. Please check Results Review for a complete set of results.    PATHOLOGY  2/2021  Station 7 lymph node, fine needle aspiration (8 smears, 1 cell block):       -  Positive for malignant cells, morphologically consistent with   metastatic squamous cell carcinoma     Assessment/Plan:       1. Chemotherapy-induced nausea    2. Secondary cancer of bone    3. Malignant neoplasm of overlapping sites of right breast in female, estrogen receptor positive    4. Recurrent cervical cancer          Plan:     # metastatic squamous cell carcinoma of the cervix SCC cervix:  history of HSIL Status post LEEP 2017. In December 2020 follow-up with gynecology with new spotty vaginal bleeding/discharge with biopsy 12/7/20 showing squamous cell carcinoma consistent with cervical primary.  MRI pelvis performed showing locally advanced disease with 4.9 cm cervical mass with right parametrial involvement and enlarged right external iliac lymph node 1.5 x 1.2 cm. PET-CT showing avidity of cervical mass with extension to lower uterine segment, right vaginal focus of avidity and lymphadenopathy including right internal external iliac, periaortic, pericaval.  There is the new hypermetabolic lymphadenopathy in right subcarinal 2.3 cm SUV 5.8. Small 9 mm right paratracheal S base slightly increased no FDG avid SUV 2.4.  01/29/2021 multidisciplinary tumor board discussion of her case with review of imaging; concern for new avid lymphadenopathy particularly in right subcarinal may be most consistent with metastatic cervical squamous cell carcinoma; tumor board recommendation for biopsy to confirm for prognostic information as well as given other concomitant malignancy to exclude alternative histology, although felt less likely metastatic breast given this is been well controlled  and primarily bony disease involvement.  We discussed recommendation for systemic chemotherapy given advanced cervical squamous cell carcinoma with cisplatin, paclitaxel and bevacizumab with discontinuation of palbociclib but can continue aromatase inhibitor; taxane may also be active for her concomitant metastatic breast cancer.   - EBUS with biopsy 2/18/21, station 7 consistent with metastatic squamous cell carcinoma of cervical origin.  - STRATA requested on cervical SCC, ERBB3 <5% felt to be subclonal per report, PIK3CA amplification, CP4443, KDM6A, FRANK, TMB low, PDL1 high may indicated sensitivity to check point inhibitor in future regimen.  - audiology exam 2/18/21, recommended that she let us know if any perceived change in her hearing throughout treatment and recommend repeat testing throughout to ensure no ototoxicity.  - [facility] germline genetic testing returned negative for mutation, provided to patient.  - cycle 1 day 1 cisplatin paclitaxel and bevacizumab on 02/22/2021  Repeat scans 04/26/2021 showed excellent improvement in thoracic and pelvic lymphadenopathy and primary cervical mass.  She is asymptomatic from this no further bleeding.  Recommended continuation of her current regimen which she is tolerating well with supportive care, IV fluids 3 times weekly per patient preference.  - restaging after cycle 6 she has continued improvement in metastatic cervical squamous cell carcinoma; avidity in left vulvar area diminished associated with known infection.  She has required substantial supportive care on chemotherapy and recommend given sustained improvement on scans continuing and absence of chemotherapy with bevacizumab alone and continue close surveillance.  She would like to continue IV fluids weekly as needed.    We reviewed in detail restaging PET-CT January 2022 notable for uptake in cervical region concerning for oligo progression/recurrence of her disease.  Previously biopsy proven metastatic  cervical cancer in lungs without evidence of recurrent mass/lymphadenopathy/avidity in this region or otherwise.    We had extensive discussion regarding treatment at this juncture.  She has been on maintenance bevacizumab.  She will be seeing Gynecologic Oncology for in-person visit/pelvic exam tomorrow unclear if possible visualization/biopsy at this time however imaging is consistent with local recurrence.  Per prior Gynecologic Oncology noted local therapy/surgery was not recommended.  Systemic therapy was recommended.  Given prior PDL1 high disease she may benefit from chemoimmunotherapy per Keynote 826 (working to clarify CPS which cannot per reported per my discussion with STRATA reps).  Unclear if her symptoms of pelvic pressure/dysuria are related to this local recurrence.  Urinalysis without evidence of infection.  I recommend radiation oncology referral for consideration of local therapy given her current symptoms and oligo progression/recurrence for palliative purposes although she does understand limited data and unclear additional benefit of local therapy with respect to overall survival in this context.    She will need chemo immunotherapy teaching/consent.  Note:  Prior difficulty with dehydration in previous chemotherapy.  She required supportive IV fluid hydration in all off weeks at a time several times per week.  Would be cautious regarding dehydration with re-initiation of chemotherapy and tentative IV fluids arranged.    # Hypothyroidism:  Continue levothyroxine 7/2021.  Significant elevation in TSH with normal free T4.  Short interval repeat.  No symptoms of hypothyroidism.  Will need to monitor closely if chemo immunotherapy pursued.    # neuropathy:  Mild, will monitor with re-initiation of chemotherapy per above.    # metastatic breast cancer ER positive HER2 negative:  on systemic therapy with palbociclib and letrozole.      # bony metastatic disease:  on zoledronic acid dosed every 12  weeks. - Last zometa 9/14/21  DEXA 8/2021 without bone loss   Recent PET scan January 2022 without evidence of new bony metastatic disease.  She feels most comfortable with maintenance q.3 months which is reasonable.  Resolution of ENT/left ear infection.  Will released from hold.  Continue calcium vitamin-D supplementation.    # history of papillary thyroid carcinoma status post total thyroidectomy on 08/31/2017: s/p total thyroidectomy on levothyroxine.     # tobacco abuse:  Current tobacco use.   I have counseled for at least 3 min on the importance of tobacco cessation and discussed pharmacologic and therapy options to  aid in cessation.  She is interested in trial of bupropion prescribed to her local pharmacy.    Follow-Up:   Patient Instructions   Follow-up on PDL1 CPS; tentative plan for resuming systemic chemotherapy with carboplatin paclitaxel and pembrolizumab, plan placed  Revisit for cycle 1 day 1 with lab CBC CMP prior  Follow-up with Gynecologic Oncology for 01/19/2022  Referral to Radiation Oncology    Addendum:  I had an extensive discussion with Ms. Flores regarding risks and benefits of systemic therapy and consideration of local therapy.  I have discussed her case with Radiation Oncology who agrees with consultation with patient discuss risks and benefits of local therapy.  Patient is interested in staying local in [place].  Radiation which may be recommended in her case involves combination external beam and brachytherapy which is not available in [facility].  As patient is interested in staying local in [place] for this type of treatment if ultimately recommended/pursued she has expressed interest with referral to Dr. Vázquez at [facility] and external referral placed for this.  Tentative plan for systemic chemo immunotherapy to start as she currently does not have notable symptoms from her local disease with restaging imaging after a couple cycles.  If she has persistent  resistant oligo progressive disease in cervix or develops local symptoms initiation of radiation therapy may be favored.

## 2022-01-18 ENCOUNTER — OFFICE VISIT (OUTPATIENT)
Dept: HEMATOLOGY/ONCOLOGY | Facility: CLINIC | Age: 52
End: 2022-01-18
Payer: MEDICAID

## 2022-01-18 ENCOUNTER — INFUSION (OUTPATIENT)
Dept: INFUSION THERAPY | Facility: HOSPITAL | Age: 52
End: 2022-01-18
Attending: INTERNAL MEDICINE
Payer: MEDICAID

## 2022-01-18 ENCOUNTER — SOCIAL WORK (OUTPATIENT)
Dept: HEMATOLOGY/ONCOLOGY | Facility: CLINIC | Age: 52
End: 2022-01-18

## 2022-01-18 VITALS
RESPIRATION RATE: 18 BRPM | TEMPERATURE: 97 F | DIASTOLIC BLOOD PRESSURE: 83 MMHG | SYSTOLIC BLOOD PRESSURE: 133 MMHG | OXYGEN SATURATION: 96 % | HEART RATE: 71 BPM

## 2022-01-18 VITALS
WEIGHT: 205.5 LBS | DIASTOLIC BLOOD PRESSURE: 83 MMHG | HEART RATE: 95 BPM | HEIGHT: 67 IN | BODY MASS INDEX: 32.25 KG/M2 | SYSTOLIC BLOOD PRESSURE: 124 MMHG | TEMPERATURE: 98 F

## 2022-01-18 DIAGNOSIS — C79.51 SECONDARY CANCER OF BONE: ICD-10-CM

## 2022-01-18 DIAGNOSIS — R11.0 CHEMOTHERAPY-INDUCED NAUSEA: Primary | ICD-10-CM

## 2022-01-18 DIAGNOSIS — T45.1X5A CHEMOTHERAPY-INDUCED NAUSEA: Primary | ICD-10-CM

## 2022-01-18 DIAGNOSIS — C50.811 MALIGNANT NEOPLASM OF OVERLAPPING SITES OF RIGHT BREAST IN FEMALE, ESTROGEN RECEPTOR POSITIVE: ICD-10-CM

## 2022-01-18 DIAGNOSIS — Z17.0 MALIGNANT NEOPLASM OF OVERLAPPING SITES OF RIGHT BREAST IN FEMALE, ESTROGEN RECEPTOR POSITIVE: ICD-10-CM

## 2022-01-18 DIAGNOSIS — C53.0 MALIGNANT NEOPLASM OF ENDOCERVIX: ICD-10-CM

## 2022-01-18 DIAGNOSIS — C53.9 RECURRENT CERVICAL CANCER: ICD-10-CM

## 2022-01-18 DIAGNOSIS — C79.51 SECONDARY CANCER OF BONE: Primary | ICD-10-CM

## 2022-01-18 PROCEDURE — 3079F DIAST BP 80-89 MM HG: CPT | Mod: CPTII,,, | Performed by: INTERNAL MEDICINE

## 2022-01-18 PROCEDURE — 25000003 PHARM REV CODE 250: Performed by: INTERNAL MEDICINE

## 2022-01-18 PROCEDURE — 63600175 PHARM REV CODE 636 W HCPCS: Performed by: INTERNAL MEDICINE

## 2022-01-18 PROCEDURE — 1160F RVW MEDS BY RX/DR IN RCRD: CPT | Mod: CPTII,,, | Performed by: INTERNAL MEDICINE

## 2022-01-18 PROCEDURE — 1159F MED LIST DOCD IN RCRD: CPT | Mod: CPTII,,, | Performed by: INTERNAL MEDICINE

## 2022-01-18 PROCEDURE — 99999 PR PBB SHADOW E&M-EST. PATIENT-LVL V: CPT | Mod: PBBFAC,,, | Performed by: INTERNAL MEDICINE

## 2022-01-18 PROCEDURE — 3074F SYST BP LT 130 MM HG: CPT | Mod: CPTII,,, | Performed by: INTERNAL MEDICINE

## 2022-01-18 PROCEDURE — 99215 PR OFFICE/OUTPT VISIT, EST, LEVL V, 40-54 MIN: ICD-10-PCS | Mod: S$PBB,,, | Performed by: INTERNAL MEDICINE

## 2022-01-18 PROCEDURE — 3008F BODY MASS INDEX DOCD: CPT | Mod: CPTII,,, | Performed by: INTERNAL MEDICINE

## 2022-01-18 PROCEDURE — 96365 THER/PROPH/DIAG IV INF INIT: CPT

## 2022-01-18 PROCEDURE — 1160F PR REVIEW ALL MEDS BY PRESCRIBER/CLIN PHARMACIST DOCUMENTED: ICD-10-PCS | Mod: CPTII,,, | Performed by: INTERNAL MEDICINE

## 2022-01-18 PROCEDURE — 99999 PR PBB SHADOW E&M-EST. PATIENT-LVL V: ICD-10-PCS | Mod: PBBFAC,,, | Performed by: INTERNAL MEDICINE

## 2022-01-18 PROCEDURE — 3008F PR BODY MASS INDEX (BMI) DOCUMENTED: ICD-10-PCS | Mod: CPTII,,, | Performed by: INTERNAL MEDICINE

## 2022-01-18 PROCEDURE — 99215 OFFICE O/P EST HI 40 MIN: CPT | Mod: S$PBB,,, | Performed by: INTERNAL MEDICINE

## 2022-01-18 PROCEDURE — 3074F PR MOST RECENT SYSTOLIC BLOOD PRESSURE < 130 MM HG: ICD-10-PCS | Mod: CPTII,,, | Performed by: INTERNAL MEDICINE

## 2022-01-18 PROCEDURE — 3079F PR MOST RECENT DIASTOLIC BLOOD PRESSURE 80-89 MM HG: ICD-10-PCS | Mod: CPTII,,, | Performed by: INTERNAL MEDICINE

## 2022-01-18 PROCEDURE — 1159F PR MEDICATION LIST DOCUMENTED IN MEDICAL RECORD: ICD-10-PCS | Mod: CPTII,,, | Performed by: INTERNAL MEDICINE

## 2022-01-18 PROCEDURE — 99215 OFFICE O/P EST HI 40 MIN: CPT | Mod: PBBFAC,25 | Performed by: INTERNAL MEDICINE

## 2022-01-18 RX ORDER — HEPARIN 100 UNIT/ML
500 SYRINGE INTRAVENOUS
Status: DISCONTINUED | OUTPATIENT
Start: 2022-01-18 | End: 2022-01-18 | Stop reason: HOSPADM

## 2022-01-18 RX ORDER — HEPARIN 100 UNIT/ML
500 SYRINGE INTRAVENOUS
Status: CANCELLED | OUTPATIENT
Start: 2022-01-18

## 2022-01-18 RX ORDER — SODIUM CHLORIDE 0.9 % (FLUSH) 0.9 %
10 SYRINGE (ML) INJECTION
Status: CANCELLED | OUTPATIENT
Start: 2022-01-18

## 2022-01-18 RX ORDER — SODIUM CHLORIDE 0.9 % (FLUSH) 0.9 %
10 SYRINGE (ML) INJECTION
Status: DISCONTINUED | OUTPATIENT
Start: 2022-01-18 | End: 2022-01-18 | Stop reason: HOSPADM

## 2022-01-18 RX ADMIN — ZOLEDRONIC ACID 3.5 MG: 4 INJECTION, SOLUTION, CONCENTRATE INTRAVENOUS at 04:01

## 2022-01-18 RX ADMIN — HEPARIN 500 UNITS: 100 SYRINGE at 04:01

## 2022-01-18 NOTE — Clinical Note
Please see addendum. D/w pt. Placed external referral. Please cancel [facility] to Ralph order.

## 2022-01-18 NOTE — DISCHARGE INSTRUCTIONS
.[facility]  [address] [facility] [address]  [address]  [phone] phone     [phone] fax  Hours of Operation: Monday- Friday 8:00am- 5:00pm  After hours phone  [phone]  Hematology / Oncology Physicians on call    Dr. Jake Moseley      Nurse Practitioners:    Virginie Garrison, VINNY Dee NP      Please don't hesitate to call if you have any concerns.    .FALL PREVENTION   Falls often occur due to slipping, tripping or losing your balance. Here are ways to reduce your risk of falling again.   Was there anything that caused your fall that can be fixed, removed or replaced?   Make your home safe by keeping walkways clear of objects you may trip over.   Use non-slip pads under rugs.   Do not walk in poorly lit areas.   Do not stand on chairs or wobbly ladders.   Use caution when reaching overhead or looking upward. This position can cause a loss of balance.   Be sure your shoes fit properly, have non-slip bottoms and are in good condition.   Be cautious when going up and down stairs, curbs, and when walking on uneven sidewalks.   If your balance is poor, consider using a cane or walker.   If your fall was related to alcohol use, stop or limit alcohol intake.   If your fall was related to use of sleeping medicines, talk to your doctor about this. You may need to reduce your dosage at bedtime if you awaken during the night to go to the bathroom.   To reduce the need for nighttime bathroom trips:   Avoid drinking fluids for several hours before going to bed   Empty your bladder before going to bed   Men can keep a urinal at the bedside   © 1635-7436 [employer], [address] [place] [address], [place] [address]. All rights reserved. This information is not intended as a substitute for professional medical care. Always follow your healthcare professional's instructions.  .WAYS TO HELP PREVENT INFECTION         WASH YOUR HANDS OFTEN  DURING THE DAY, ESPECIALLY BEFORE YOU EAT, AFTER USING THE BATHROOM, AND AFTER TOUCHING ANIMALS     STAY AWAY FROM PEOPLE WHO HAVE ILLNESSES YOU CAN CATCH; SUCH AS COLDS, FLU, CHICKEN POX     TRY TO AVOID CROWDS     STAY AWAY FROM CHILDREN WHO RECENTLY HAVE RECEIVED LIVE VIRUS VACCINES     MAINTAIN GOOD MOUTH CARE     DO NOT SQUEEZE OR SCRATCH PIMPLES     CLEAN CUTS & SCRAPES RIGHT AWAY AND DAILY UNTIL HEALED WITH WARM WATER, SOAP & AN ANTISEPTIC     AVOID CONTACT WITH LITTER BOXES, BIRD CAGES, & FISH TANKS     AVOID STANDING WATER, IE., BIRD BATHS, FLOWER POTS/VASES, OR HUMIDIFIERS     WEAR GLOVES WHEN GARDENING OR CLEANING UP AFTER OTHERS, ESPECIALLY BABIES & SMALL CHILDREN     DO NOT EAT RAW FISH, SEAFOOD, MEAT, OR EGGS

## 2022-01-19 ENCOUNTER — TELEPHONE (OUTPATIENT)
Dept: HEMATOLOGY/ONCOLOGY | Facility: CLINIC | Age: 52
End: 2022-01-19
Payer: MEDICAID

## 2022-01-19 ENCOUNTER — TELEPHONE (OUTPATIENT)
Dept: RADIATION ONCOLOGY | Facility: CLINIC | Age: 52
End: 2022-01-19
Payer: MEDICAID

## 2022-01-19 ENCOUNTER — LAB VISIT (OUTPATIENT)
Dept: LAB | Facility: HOSPITAL | Age: 52
End: 2022-01-19
Attending: OBSTETRICS & GYNECOLOGY
Payer: MEDICAID

## 2022-01-19 ENCOUNTER — OFFICE VISIT (OUTPATIENT)
Dept: GYNECOLOGIC ONCOLOGY | Facility: CLINIC | Age: 52
End: 2022-01-19
Payer: MEDICAID

## 2022-01-19 VITALS
DIASTOLIC BLOOD PRESSURE: 91 MMHG | SYSTOLIC BLOOD PRESSURE: 133 MMHG | HEIGHT: 67 IN | WEIGHT: 202.81 LBS | HEART RATE: 92 BPM | BODY MASS INDEX: 31.83 KG/M2

## 2022-01-19 DIAGNOSIS — C53.0 MALIGNANT NEOPLASM OF ENDOCERVIX: Primary | ICD-10-CM

## 2022-01-19 DIAGNOSIS — C53.0 MALIGNANT NEOPLASM OF ENDOCERVIX: ICD-10-CM

## 2022-01-19 DIAGNOSIS — Z72.0 TOBACCO ABUSE: ICD-10-CM

## 2022-01-19 DIAGNOSIS — E66.9 OBESITY (BMI 30-39.9): ICD-10-CM

## 2022-01-19 DIAGNOSIS — Z17.0 MALIGNANT NEOPLASM OF OVERLAPPING SITES OF RIGHT BREAST IN FEMALE, ESTROGEN RECEPTOR POSITIVE: ICD-10-CM

## 2022-01-19 DIAGNOSIS — J44.9 CHRONIC OBSTRUCTIVE PULMONARY DISEASE, UNSPECIFIED COPD TYPE: ICD-10-CM

## 2022-01-19 DIAGNOSIS — C73 PAPILLARY THYROID CARCINOMA: ICD-10-CM

## 2022-01-19 DIAGNOSIS — C50.811 MALIGNANT NEOPLASM OF OVERLAPPING SITES OF RIGHT BREAST IN FEMALE, ESTROGEN RECEPTOR POSITIVE: ICD-10-CM

## 2022-01-19 PROCEDURE — 3075F SYST BP GE 130 - 139MM HG: CPT | Mod: CPTII,,, | Performed by: OBSTETRICS & GYNECOLOGY

## 2022-01-19 PROCEDURE — 3080F DIAST BP >= 90 MM HG: CPT | Mod: CPTII,,, | Performed by: OBSTETRICS & GYNECOLOGY

## 2022-01-19 PROCEDURE — 3080F PR MOST RECENT DIASTOLIC BLOOD PRESSURE >= 90 MM HG: ICD-10-PCS | Mod: CPTII,,, | Performed by: OBSTETRICS & GYNECOLOGY

## 2022-01-19 PROCEDURE — 1159F PR MEDICATION LIST DOCUMENTED IN MEDICAL RECORD: ICD-10-PCS | Mod: CPTII,,, | Performed by: OBSTETRICS & GYNECOLOGY

## 2022-01-19 PROCEDURE — 99214 PR OFFICE/OUTPT VISIT, EST, LEVL IV, 30-39 MIN: ICD-10-PCS | Mod: S$PBB,,, | Performed by: OBSTETRICS & GYNECOLOGY

## 2022-01-19 PROCEDURE — 88363 XM ARCHIVE TISSUE MOLEC ANAL: CPT | Performed by: PATHOLOGY

## 2022-01-19 PROCEDURE — 99214 OFFICE O/P EST MOD 30 MIN: CPT | Mod: S$PBB,,, | Performed by: OBSTETRICS & GYNECOLOGY

## 2022-01-19 PROCEDURE — 3008F PR BODY MASS INDEX (BMI) DOCUMENTED: ICD-10-PCS | Mod: CPTII,,, | Performed by: OBSTETRICS & GYNECOLOGY

## 2022-01-19 PROCEDURE — 88360 TUMOR IMMUNOHISTOCHEM/MANUAL: CPT | Performed by: PATHOLOGY

## 2022-01-19 PROCEDURE — 3075F PR MOST RECENT SYSTOLIC BLOOD PRESS GE 130-139MM HG: ICD-10-PCS | Mod: CPTII,,, | Performed by: OBSTETRICS & GYNECOLOGY

## 2022-01-19 PROCEDURE — 88363 XM ARCHIVE TISSUE MOLEC ANAL: CPT | Mod: ,,, | Performed by: PATHOLOGY

## 2022-01-19 PROCEDURE — 3008F BODY MASS INDEX DOCD: CPT | Mod: CPTII,,, | Performed by: OBSTETRICS & GYNECOLOGY

## 2022-01-19 PROCEDURE — 88363 PR  EXAM & SELECT ARCHIVE TISSUE MOLECULAR ANALYSIS: ICD-10-PCS | Mod: ,,, | Performed by: PATHOLOGY

## 2022-01-19 PROCEDURE — 1159F MED LIST DOCD IN RCRD: CPT | Mod: CPTII,,, | Performed by: OBSTETRICS & GYNECOLOGY

## 2022-01-19 PROCEDURE — 99999 PR PBB SHADOW E&M-EST. PATIENT-LVL III: ICD-10-PCS | Mod: PBBFAC,,, | Performed by: OBSTETRICS & GYNECOLOGY

## 2022-01-19 PROCEDURE — 99999 PR PBB SHADOW E&M-EST. PATIENT-LVL III: CPT | Mod: PBBFAC,,, | Performed by: OBSTETRICS & GYNECOLOGY

## 2022-01-19 PROCEDURE — 1160F PR REVIEW ALL MEDS BY PRESCRIBER/CLIN PHARMACIST DOCUMENTED: ICD-10-PCS | Mod: CPTII,,, | Performed by: OBSTETRICS & GYNECOLOGY

## 2022-01-19 PROCEDURE — 99213 OFFICE O/P EST LOW 20 MIN: CPT | Mod: PBBFAC,25 | Performed by: OBSTETRICS & GYNECOLOGY

## 2022-01-19 PROCEDURE — 1160F RVW MEDS BY RX/DR IN RCRD: CPT | Mod: CPTII,,, | Performed by: OBSTETRICS & GYNECOLOGY

## 2022-01-19 NOTE — PATIENT INSTRUCTIONS
Follow-up on PDL1 CPS; tentative plan for resuming systemic chemotherapy with carboplatin paclitaxel and pembrolizumab, plan placed  Revisit for cycle 1 day 1 with lab CBC CMP prior  Follow-up with Gynecologic Oncology for 01/19/2022  Referral to Radiation Oncology

## 2022-01-19 NOTE — TELEPHONE ENCOUNTER
Attempted to call patient to schedule Rad Onc consult but there was no answer. Left a detailed message along with our call back number for the patient to call us back to schedule the appt.

## 2022-01-19 NOTE — TELEPHONE ENCOUNTER
Pt returned my call and we reviewed my role as nurse navigator and she has my direct contact number as well as the after hours number  We reviewed communication from Dr. Asencio to have pt continue with taxol and carboplatin but adding Keytruda   Pt familiar with plan  Together we set up education session for 1/20/22 at 1000am at the cancer center for Keytruda teaching  I am awaiting placement of treatment plan and then will contact pt to set up C1D1 carboplatin Taxol and Keytruda treatment. Pt is in agreement with plan.   Pt knows to contact me with any further concerns meanwhile.

## 2022-01-20 ENCOUNTER — TELEPHONE (OUTPATIENT)
Dept: HEMATOLOGY/ONCOLOGY | Facility: CLINIC | Age: 52
End: 2022-01-20
Payer: MEDICAID

## 2022-01-20 ENCOUNTER — DOCUMENTATION ONLY (OUTPATIENT)
Dept: HEMATOLOGY/ONCOLOGY | Facility: CLINIC | Age: 52
End: 2022-01-20

## 2022-01-20 ENCOUNTER — CLINICAL SUPPORT (OUTPATIENT)
Dept: HEMATOLOGY/ONCOLOGY | Facility: CLINIC | Age: 52
End: 2022-01-20
Payer: MEDICAID

## 2022-01-20 DIAGNOSIS — C53.0 MALIGNANT NEOPLASM OF ENDOCERVIX: ICD-10-CM

## 2022-01-20 DIAGNOSIS — R11.0 CHEMOTHERAPY-INDUCED NAUSEA: ICD-10-CM

## 2022-01-20 DIAGNOSIS — C53.9 RECURRENT CERVICAL CANCER: ICD-10-CM

## 2022-01-20 DIAGNOSIS — N17.9 AKI (ACUTE KIDNEY INJURY): ICD-10-CM

## 2022-01-20 DIAGNOSIS — Z71.9 ENCOUNTER FOR EDUCATION: Primary | ICD-10-CM

## 2022-01-20 DIAGNOSIS — C73 PAPILLARY THYROID CARCINOMA: ICD-10-CM

## 2022-01-20 DIAGNOSIS — T45.1X5A CHEMOTHERAPY-INDUCED NAUSEA: ICD-10-CM

## 2022-01-20 DIAGNOSIS — C79.51 SECONDARY CANCER OF BONE: ICD-10-CM

## 2022-01-20 RX ORDER — DEXAMETHASONE 4 MG/1
8 TABLET ORAL DAILY
Qty: 24 TABLET | Refills: 0 | Status: SHIPPED | OUTPATIENT
Start: 2022-01-31 | End: 2022-09-23

## 2022-01-20 RX ORDER — OLANZAPINE 5 MG/1
5 TABLET ORAL NIGHTLY
Qty: 30 TABLET | Refills: 2 | Status: SHIPPED | OUTPATIENT
Start: 2022-01-30 | End: 2022-09-01 | Stop reason: SDUPTHER

## 2022-01-20 RX ORDER — ONDANSETRON 8 MG/1
8 TABLET, ORALLY DISINTEGRATING ORAL EVERY 8 HOURS PRN
Qty: 60 TABLET | Refills: 5 | Status: SHIPPED | OUTPATIENT
Start: 2022-01-30 | End: 2022-08-24 | Stop reason: SDUPTHER

## 2022-01-20 NOTE — TELEPHONE ENCOUNTER
Pt to attend chemo education appt today. appts for 1/28/22 treatment printed and will be given to pt in person at her education appt today.   Spoke with [facility] rad/onc at [phone] and have faxed referral to Dr. Vázquez at [phone] as per Dr. Asencio's orders.  According to intake person they will evaluate referral information and reach out to patient to set up appt with dr Vázquez.   Your fax has been successfully sent to [phone] at [phone].

## 2022-01-20 NOTE — NURSING
Spoke with pt over the phone and we reviewed my role as nurse navigator and she has my direct contact information. She expressed concern over side effects of chemo like last time and did attend education session this am. She will give It a try , however remains reluctant  I explained to her that I sent a referral to DR. Vázquez at [facility] radiation for consult today and that someone should be reaching out to her to set up appt  She will let us know if she hasn't heard from them by next week  We reviewed her lab, MD and infusion appts for 1/28/22 at [facility] [place] [facility]. She will not need a rapid covid screen as she has been treated recently.   Pt expressed understanding and agreement with plan and will call me with any further concerns.   Oncology Navigation   Intake  Cancer Type:  (metastatic sq cell ca cervix)  Initial Nurse Navigator Contact: 1/18/2022  Date Worked: 1/20/2022  Multiple appointments: Yes  Start of Treatment: 2/11/2021  Reason for Treatment Delay: Further work-up     Treatment  Current Status: Active  Date Presented to Tumor Board: 1/29/2021       Medical Oncologist: jorden  Chemotherapy: Planned  Chemotherapy Regimen: taxol carboplatin  Immunotherapy: Planned  Immunotherapy Name: keytruda  Start Date: 1/28/2022    Radiation Oncologist: Dr. Vázquez at [facility]  referred for consult 1/20/22    Procedures: Other  Other Schedule Date: 2/8/2021 (audiogram)    General Referrals: Social work  Social Work Referral Date: 2/1/2021       Radiation Oncologist: Dr. Vázquez at [facility]  referred for consult 1/20/22    Support Systems: Spouse/significant other  Barriers of Care: Financial concerns; Transportation  Financial Concerns: Financial hardship  Transportation Barriers: distress score 6  Concerns: concern over side effects of chemo like last time     Acuity  Systemic Treatment - predicted or initiated: Chemotherapy regimen with multiple drugs (+1)  Treatment Tolerability: Has not started treatment yet/treatment fully  completed and side effects resolved  ECO-3 (+1)  Comorbidities in Medical History: 6+ (+2)   Needed: No  Support: Patient reports adequate support system  Transportation: Adequate transportation for treatment  History of noncompliance/frequent no shows and cancellations: No  Verbalizes the need for more education: Yes  Navigation Acuity: 7     Follow Up  No follow-ups on file.

## 2022-01-20 NOTE — PROGRESS NOTES
Pharmacist Patient Education Note    Patient and her  were counseled and given information regarding the chemotherapy regimen: Carboplatin, Paclitaxel, Pembrolizumab q21d, followed by pembrolizumab maintenance q42d      Chemotherapy regimen was explained in detail.  Medication specific and supportive care hand outs were provided to the patient.  The chemotherapy medications and how they will be administered, relevant labs, and take home medications were reviewed.      The following side effects were discussed:    Carboplatin  - Bone marrow suppression   - Nausea and vomiting  - Liver and kidney function changes  - Electrolyte changes   - Allergic reactions  - Peripheral neuropathy  - Blurred vision  - Decreased hearing    Paclitaxel   - Bone marrow suppression  - Mouth sores  - Nausea and vomiting  - Diarrhea  - Swelling of legs/ankles/feet  - Injection site or allergic reactions  - Changes in liver or kidney function  - Bone, joint, muscle pain  - Peripheral neuropathy   - Hair loss   - Flushing   - Low blood pressure     Pembrolizumab  - diarrhea  - rash  - changes in kidney and liver function  - pneumonitis  - changes in thyroid, pituitary, adrenals, and pancreas  - blood glucose changes    The patient's home medications were reviewed for drug interactions.  The following drug interactions were found: no new interactions      The patient concerns of what to expect and whether it will be as difficult to tolerate as the previous chemotherapy were addressed and all current questions were answered. Patient Josey Flores verbalized understanding of education Pharmacy will be available throughout the patient's treatment for further questions.    Thank you for allowing me to be part of the care of this patient    Shelia Brice, PharmD, BCPS, [facility]  Clinical Pharmacy Specialist - Oncology  [facility]  [phone]

## 2022-01-20 NOTE — TELEPHONE ENCOUNTER
Appt sheet was not given to pt during education session this am.  LM for pt to call me back regarding appt information.

## 2022-01-23 ENCOUNTER — PATIENT MESSAGE (OUTPATIENT)
Dept: HEMATOLOGY/ONCOLOGY | Facility: CLINIC | Age: 52
End: 2022-01-23
Payer: MEDICAID

## 2022-01-24 LAB
FINAL PATHOLOGIC DIAGNOSIS: ABNORMAL
GROSS: ABNORMAL

## 2022-01-25 ENCOUNTER — TELEPHONE (OUTPATIENT)
Dept: HEMATOLOGY/ONCOLOGY | Facility: CLINIC | Age: 52
End: 2022-01-25
Payer: MEDICAID

## 2022-01-25 NOTE — TELEPHONE ENCOUNTER
msg to pre service to obtain auth for this Friday/ check status    Spoke with pt over the phone to confirm that she has appt with Dr. Tineo 2/7/22 at [facility] radiation

## 2022-01-26 ENCOUNTER — TELEPHONE (OUTPATIENT)
Dept: HEMATOLOGY/ONCOLOGY | Facility: CLINIC | Age: 52
End: 2022-01-26
Payer: MEDICAID

## 2022-01-26 DIAGNOSIS — R19.7 DIARRHEA, UNSPECIFIED TYPE: ICD-10-CM

## 2022-01-26 RX ORDER — DIPHENOXYLATE HYDROCHLORIDE AND ATROPINE SULFATE 2.5; .025 MG/1; MG/1
1 TABLET ORAL 4 TIMES DAILY PRN
Qty: 30 TABLET | Refills: 1 | Status: SHIPPED | OUTPATIENT
Start: 2022-01-26 | End: 2022-08-16 | Stop reason: SDUPTHER

## 2022-01-26 NOTE — TELEPHONE ENCOUNTER
Nurse called [phone] option 2 and left a message to request a peer to peer for patients Keytruda.

## 2022-01-28 ENCOUNTER — OFFICE VISIT (OUTPATIENT)
Dept: HEMATOLOGY/ONCOLOGY | Facility: CLINIC | Age: 52
End: 2022-01-28
Payer: MEDICAID

## 2022-01-28 ENCOUNTER — INFUSION (OUTPATIENT)
Dept: INFUSION THERAPY | Facility: HOSPITAL | Age: 52
End: 2022-01-28
Attending: INTERNAL MEDICINE
Payer: MEDICAID

## 2022-01-28 VITALS
HEART RATE: 97 BPM | DIASTOLIC BLOOD PRESSURE: 106 MMHG | BODY MASS INDEX: 32.67 KG/M2 | SYSTOLIC BLOOD PRESSURE: 141 MMHG | TEMPERATURE: 97 F | OXYGEN SATURATION: 99 % | WEIGHT: 208.13 LBS | HEIGHT: 67 IN

## 2022-01-28 VITALS
DIASTOLIC BLOOD PRESSURE: 72 MMHG | RESPIRATION RATE: 16 BRPM | OXYGEN SATURATION: 95 % | SYSTOLIC BLOOD PRESSURE: 111 MMHG | HEART RATE: 81 BPM | TEMPERATURE: 98 F

## 2022-01-28 DIAGNOSIS — C79.51 SECONDARY CANCER OF BONE: ICD-10-CM

## 2022-01-28 DIAGNOSIS — C53.9 RECURRENT CERVICAL CANCER: Primary | ICD-10-CM

## 2022-01-28 DIAGNOSIS — C53.0 MALIGNANT NEOPLASM OF ENDOCERVIX: ICD-10-CM

## 2022-01-28 DIAGNOSIS — N18.32 STAGE 3B CHRONIC KIDNEY DISEASE: ICD-10-CM

## 2022-01-28 DIAGNOSIS — C53.0 MALIGNANT NEOPLASM OF ENDOCERVIX: Primary | ICD-10-CM

## 2022-01-28 PROCEDURE — 1160F PR REVIEW ALL MEDS BY PRESCRIBER/CLIN PHARMACIST DOCUMENTED: ICD-10-PCS | Mod: CPTII,,, | Performed by: INTERNAL MEDICINE

## 2022-01-28 PROCEDURE — 99999 PR PBB SHADOW E&M-EST. PATIENT-LVL IV: ICD-10-PCS | Mod: PBBFAC,,, | Performed by: INTERNAL MEDICINE

## 2022-01-28 PROCEDURE — 3008F BODY MASS INDEX DOCD: CPT | Mod: CPTII,,, | Performed by: INTERNAL MEDICINE

## 2022-01-28 PROCEDURE — 99215 PR OFFICE/OUTPT VISIT, EST, LEVL V, 40-54 MIN: ICD-10-PCS | Mod: S$PBB,,, | Performed by: INTERNAL MEDICINE

## 2022-01-28 PROCEDURE — 1159F PR MEDICATION LIST DOCUMENTED IN MEDICAL RECORD: ICD-10-PCS | Mod: CPTII,,, | Performed by: INTERNAL MEDICINE

## 2022-01-28 PROCEDURE — 96375 TX/PRO/DX INJ NEW DRUG ADDON: CPT

## 2022-01-28 PROCEDURE — 3008F PR BODY MASS INDEX (BMI) DOCUMENTED: ICD-10-PCS | Mod: CPTII,,, | Performed by: INTERNAL MEDICINE

## 2022-01-28 PROCEDURE — 1159F MED LIST DOCD IN RCRD: CPT | Mod: CPTII,,, | Performed by: INTERNAL MEDICINE

## 2022-01-28 PROCEDURE — 63600175 PHARM REV CODE 636 W HCPCS: Performed by: INTERNAL MEDICINE

## 2022-01-28 PROCEDURE — 96413 CHEMO IV INFUSION 1 HR: CPT

## 2022-01-28 PROCEDURE — 96417 CHEMO IV INFUS EACH ADDL SEQ: CPT

## 2022-01-28 PROCEDURE — 3080F DIAST BP >= 90 MM HG: CPT | Mod: CPTII,,, | Performed by: INTERNAL MEDICINE

## 2022-01-28 PROCEDURE — 96367 TX/PROPH/DG ADDL SEQ IV INF: CPT

## 2022-01-28 PROCEDURE — 3080F PR MOST RECENT DIASTOLIC BLOOD PRESSURE >= 90 MM HG: ICD-10-PCS | Mod: CPTII,,, | Performed by: INTERNAL MEDICINE

## 2022-01-28 PROCEDURE — 99215 OFFICE O/P EST HI 40 MIN: CPT | Mod: S$PBB,,, | Performed by: INTERNAL MEDICINE

## 2022-01-28 PROCEDURE — 99214 OFFICE O/P EST MOD 30 MIN: CPT | Mod: PBBFAC,25 | Performed by: INTERNAL MEDICINE

## 2022-01-28 PROCEDURE — 96415 CHEMO IV INFUSION ADDL HR: CPT

## 2022-01-28 PROCEDURE — 25000003 PHARM REV CODE 250: Performed by: INTERNAL MEDICINE

## 2022-01-28 PROCEDURE — 1160F RVW MEDS BY RX/DR IN RCRD: CPT | Mod: CPTII,,, | Performed by: INTERNAL MEDICINE

## 2022-01-28 PROCEDURE — 3077F SYST BP >= 140 MM HG: CPT | Mod: CPTII,,, | Performed by: INTERNAL MEDICINE

## 2022-01-28 PROCEDURE — 99999 PR PBB SHADOW E&M-EST. PATIENT-LVL IV: CPT | Mod: PBBFAC,,, | Performed by: INTERNAL MEDICINE

## 2022-01-28 PROCEDURE — 3077F PR MOST RECENT SYSTOLIC BLOOD PRESSURE >= 140 MM HG: ICD-10-PCS | Mod: CPTII,,, | Performed by: INTERNAL MEDICINE

## 2022-01-28 RX ORDER — LORAZEPAM 2 MG/ML
0.5 INJECTION INTRAMUSCULAR
Status: CANCELLED
Start: 2022-01-31 | End: 2022-01-31

## 2022-01-28 RX ORDER — LORAZEPAM 2 MG/ML
0.5 INJECTION INTRAMUSCULAR
Status: DISCONTINUED | OUTPATIENT
Start: 2022-01-28 | End: 2022-01-28 | Stop reason: HOSPADM

## 2022-01-28 RX ORDER — HEPARIN 100 UNIT/ML
500 SYRINGE INTRAVENOUS
Status: CANCELLED | OUTPATIENT
Start: 2022-01-31

## 2022-01-28 RX ORDER — EPINEPHRINE 0.3 MG/.3ML
0.3 INJECTION SUBCUTANEOUS ONCE AS NEEDED
Status: CANCELLED | OUTPATIENT
Start: 2022-01-31

## 2022-01-28 RX ORDER — HEPARIN 100 UNIT/ML
500 SYRINGE INTRAVENOUS
Status: DISCONTINUED | OUTPATIENT
Start: 2022-01-28 | End: 2022-01-28 | Stop reason: HOSPADM

## 2022-01-28 RX ORDER — FAMOTIDINE 10 MG/ML
20 INJECTION INTRAVENOUS
Status: CANCELLED | OUTPATIENT
Start: 2022-01-31

## 2022-01-28 RX ORDER — FAMOTIDINE 10 MG/ML
20 INJECTION INTRAVENOUS
Status: COMPLETED | OUTPATIENT
Start: 2022-01-28 | End: 2022-01-28

## 2022-01-28 RX ORDER — SODIUM CHLORIDE 0.9 % (FLUSH) 0.9 %
10 SYRINGE (ML) INJECTION
Status: CANCELLED | OUTPATIENT
Start: 2022-01-31

## 2022-01-28 RX ORDER — DIPHENHYDRAMINE HYDROCHLORIDE 50 MG/ML
50 INJECTION INTRAMUSCULAR; INTRAVENOUS ONCE AS NEEDED
Status: CANCELLED | OUTPATIENT
Start: 2022-01-31

## 2022-01-28 RX ADMIN — APREPITANT 130 MG: 130 INJECTION, EMULSION INTRAVENOUS at 10:01

## 2022-01-28 RX ADMIN — PACLITAXEL 366 MG: 6 INJECTION, SOLUTION INTRAVENOUS at 11:01

## 2022-01-28 RX ADMIN — SODIUM CHLORIDE: 0.9 INJECTION, SOLUTION INTRAVENOUS at 09:01

## 2022-01-28 RX ADMIN — HEPARIN 500 UNITS: 100 SYRINGE at 03:01

## 2022-01-28 RX ADMIN — DIPHENHYDRAMINE HYDROCHLORIDE 50 MG: 50 INJECTION, SOLUTION INTRAMUSCULAR; INTRAVENOUS at 10:01

## 2022-01-28 RX ADMIN — CARBOPLATIN 425 MG: 10 INJECTION, SOLUTION INTRAVENOUS at 02:01

## 2022-01-28 RX ADMIN — FAMOTIDINE 20 MG: 10 INJECTION INTRAVENOUS at 10:01

## 2022-01-28 RX ADMIN — DEXAMETHASONE SODIUM PHOSPHATE: 4 INJECTION, SOLUTION INTRA-ARTICULAR; INTRALESIONAL; INTRAMUSCULAR; INTRAVENOUS; SOFT TISSUE at 10:01

## 2022-01-28 RX ADMIN — SODIUM CHLORIDE 200 MG: 9 INJECTION, SOLUTION INTRAVENOUS at 09:01

## 2022-01-28 NOTE — DISCHARGE INSTRUCTIONS
[facility]  [address] [facility]  [address]  [phone] phone     [phone] fax  Hours of Operation: Monday- Friday 8:00am- 5:00pm  After hours phone  [phone]  Hematology / Oncology Physicians on call      Dr. Jake Garrison, VINNY Dee NP    Please call with any concerns regarding your appointment today.    HOME CARE AFTER CHEMOTHERAPY   Meals   Many patients feel sick and lose their appetites during treatment. Eat small meals several times a day. Choose bland foods with little taste or smell if you have problems with nausea. Be sure to cook all food thoroughly. This kills bacteria and helps you avoid intestinal infection. Soft foods are easier to swallow and digest.   Activity   Exercise keeps you strong and keeps your heart and lungs active. Talk to your doctor about an appropriate exercise program for you.   Skin Care   To prevent a skin infection, bathe or shower once a day. Use a moisturizing soap and wash with warm water. Avoid very hot or cold water. Chemotherapy can make your skin dry . Apply moisturizing lotion to help relieve dry skin. Some drugs used in high doses can cause slight burns to appear (like sunburn). Ask for a special cream to help relieve the burn and protect your skin.   Prevent Mouth Sores   During chemotherapy, many people get mouth sores. Do the following to help prevent mouth sores or to ease discomfort.   Brush your teeth with a soft-bristle toothbrush after every meal.  Don't use dental floss if your platelet count is below 50,000. Your doctor or nurse will tell you if this is the case.  Use an oral swab or special soft toothbrush if your gums bleed during regular brushing.  Use mouthwash as directed. If you can't tolerate commercial mouthwash, use salt and baking soda to clean your mouth. Mix 1 teaspoon of salt and 1 teaspoon of baking soda into a glass of water.  Swish and spit.  Call your doctor or return to this facility if you develop any of the following:   Sore throat   White patches in the mouth or throat   Fever of 100.4ºF (38ºC) or higher, or as directed by your healthcare provider  © 2045-8597 [employer], [address] [place] [address]. All rights reserved. This information is not intended as a substitute for professional medical care. Always follow your healthcare professional's   FALL PREVENTION   Falls often occur due to slipping, tripping or losing your balance. Here are ways to reduce your risk of falling again.   Was there anything that caused your fall that can be fixed, removed or replaced?   Make your home safe by keeping walkways clear of objects you may trip over.   Use non-slip pads under rugs.   Do not walk in poorly lit areas.   Do not stand on chairs or wobbly ladders.   Use caution when reaching overhead or looking upward. This position can cause a loss of balance.   Be sure your shoes fit properly, have non-slip bottoms and are in good condition.   Be cautious when going up and down stairs, curbs, and when walking on uneven sidewalks.   If your balance is poor, consider using a cane or walker.   If your fall was related to alcohol use, stop or limit alcohol intake.   If your fall was related to use of sleeping medicines, talk to your doctor about this. You may need to reduce your dosage at bedtime if you awaken during the night to go to the bathroom.   To reduce the need for nighttime bathroom trips:   Avoid drinking fluids for several hours before going to bed   Empty your bladder before going to bed   Men can keep a urinal at the bedside   © 9815-5640 [employer] [facility], [address] [place], [place] [address]. All rights reserved. This information is not intended as a substitute for professional medical care. Always follow your healthcare professional's instructions.  WAYS TO HELP PREVENT INFECTION         WASH YOUR HANDS OFTEN  DURING THE DAY, ESPECIALLY BEFORE YOU EAT, AFTER USING THE BATHROOM, AND AFTER TOUCHING ANIMALS     STAY AWAY FROM PEOPLE WHO HAVE ILLNESSES YOU CAN CATCH; SUCH AS COLDS, FLU, CHICKEN POX     TRY TO AVOID CROWDS     STAY AWAY FROM CHILDREN WHO RECENTLY HAVE RECEIVED LIVE VIRUS VACCINES     MAINTAIN GOOD MOUTH CARE     DO NOT SQUEEZE OR SCRATCH PIMPLES     CLEAN CUTS & SCRAPES RIGHT AWAY AND DAILY UNTIL HEALED WITH WARM WATER, SOAP & AN ANTISEPTIC     AVOID CONTACT WITH LITTER BOXES, BIRD CAGES, & FISH TANKS     AVOID STANDING WATER, IE., BIRD BATHS, FLOWER POTS/VASES, OR HUMIDIFIERS     WEAR GLOVES WHEN GARDENING OR CLEANING UP AFTER OTHERS, ESPECIALLY BABIES & SMALL CHILDREN     DO NOT EAT RAW FISH, SEAFOOD, MEAT, OR EGGS

## 2022-01-28 NOTE — PROGRESS NOTES
Subjective:      DATE OF VISIT: 1/28/2022   ?   Patient ID:?Josey Flores is a 51 y.o. female.?? MR#: [id number]   ?   PRIMARY PROVIDER: Dr. Asencio  ?   CHIEF COMPLAINT:  Follow-up  ?   ONCOLOGIC DIAGNOSIS:      Stage IV cervical squamous cell carcinoma    stage IV, T2 N1b M1, invasive breast carcinoma, ER/TN+, HER2-    Papillary thyroid carcinoma status post total thyroidectomy on 08/31/2017, mpT1b N0     Cervical HSIL MIRELILE 3 s/p LEEP, 2017  ?   CURRENT TREATMENT:    Letrozole  Carboplatin, paclitaxel and pembrolizumab, C1D1 1/28/22    PAST TREATMENT:    Palliative chemotherapy: cisplatin, paclitaxel and bevacizumab; 02/22/2021 cycle 1 day 1 -> bevacizumab maintenance after 6 cycles  Palbociclib and letrozole (palbociclib held with ongoing chemotherapy due to concern for cytopenias)    INTERVAL EVENTS    She presents for initiation of new treatment for recurrent cervical carcinoma.  She denies any pelvic symptoms including pain distension bleeding bowel or bladder changes.  She is very nervous for her new treatment given difficulties with her prior chemotherapy with dehydration requiring prophylactic IV fluids with improvement.  We discussed strategies for close surveillance and intervention supportive care as needed.  She has held her palbociclib this last week in anticipation of initiation of chemotherapy again.    ROS  A comprehensive 14-point review of systems was reviewed with patient and was negative other than as specified above.   ?     Objective:      Physical Exam        Vitals:    01/28/22 0828   BP: (!) 141/106   Pulse: 97   Temp: 97.3 °F (36.3 °C)        ECOG:?0   General appearance: Generally well appearing, in no acute distress.   Head, eyes, ears, nose, and throat: moist mucous membranes.   Respiratory:  Normal work of breathing  Abdomen: nontender, nondistended.   Extremities: Warm, without edema.   Neurologic: Alert and oriented. Grossly normal strength, coordination, and gait.   Skin: No  rashes, ecchymoses or petechial lesion.   Psychiatric:  Normal mood and affect.      Laboratory:  ?   Lab Visit on 01/28/2022   Component Date Value Ref Range Status    WBC 01/28/2022 7.29  3.90 - 12.70 K/uL Final    RBC 01/28/2022 3.88* 4.00 - 5.40 M/uL Final    Hemoglobin 01/28/2022 13.4  12.0 - 16.0 g/dL Final    Hematocrit 01/28/2022 40.4  37.0 - 48.5 % Final    MCV 01/28/2022 104* 82 - 98 fL Final    MCH 01/28/2022 34.5* 27.0 - 31.0 pg Final    MCHC 01/28/2022 33.2  32.0 - 36.0 g/dL Final    RDW 01/28/2022 17.9* 11.5 - 14.5 % Final    Platelets 01/28/2022 198  150 - 450 K/uL Final    MPV 01/28/2022 10.6  9.2 - 12.9 fL Final    Immature Granulocytes 01/28/2022 2.1* 0.0 - 0.5 % Final    Gran # (ANC) 01/28/2022 3.8  1.8 - 7.7 K/uL Final    Immature Grans (Abs) 01/28/2022 0.15* 0.00 - 0.04 K/uL Final    Lymph # 01/28/2022 2.4  1.0 - 4.8 K/uL Final    Mono # 01/28/2022 0.6  0.3 - 1.0 K/uL Final    Eos # 01/28/2022 0.3  0.0 - 0.5 K/uL Final    Baso # 01/28/2022 0.13  0.00 - 0.20 K/uL Final    nRBC 01/28/2022 0  0 /100 WBC Final    Gran % 01/28/2022 51.8  38.0 - 73.0 % Final    Lymph % 01/28/2022 32.5  18.0 - 48.0 % Final    Mono % 01/28/2022 8.0  4.0 - 15.0 % Final    Eosinophil % 01/28/2022 3.8  0.0 - 8.0 % Final    Basophil % 01/28/2022 1.8  0.0 - 1.9 % Final    Differential Method 01/28/2022 Automated   Final    Sodium 01/28/2022 138  136 - 145 mmol/L Final    Potassium 01/28/2022 4.4  3.5 - 5.1 mmol/L Final    Chloride 01/28/2022 101  95 - 110 mmol/L Final    CO2 01/28/2022 26  23 - 29 mmol/L Final    Glucose 01/28/2022 113* 70 - 110 mg/dL Final    BUN 01/28/2022 21* 6 - 20 mg/dL Final    Creatinine 01/28/2022 1.6* 0.5 - 1.4 mg/dL Final    Calcium 01/28/2022 9.4  8.7 - 10.5 mg/dL Final    Total Protein 01/28/2022 7.6  6.0 - 8.4 g/dL Final    Albumin 01/28/2022 3.8  3.5 - 5.2 g/dL Final    Total Bilirubin 01/28/2022 0.4  0.1 - 1.0 mg/dL Final    Alkaline Phosphatase 01/28/2022  59  55 - 135 U/L Final    AST 01/28/2022 17  10 - 40 U/L Final    ALT 01/28/2022 15  10 - 44 U/L Final    Anion Gap 01/28/2022 11  8 - 16 mmol/L Final    eGFR if  01/28/2022 42* >60 mL/min/1.73 m^2 Final    eGFR if non  01/28/2022 37* >60 mL/min/1.73 m^2 Final      Lab Results   Component Value Date    WBC 7.29 01/28/2022    HGB 13.4 01/28/2022    HCT 40.4 01/28/2022     (H) 01/28/2022     01/28/2022         Chemistry        Component Value Date/Time     01/28/2022 0809    K 4.4 01/28/2022 0809     01/28/2022 0809    CO2 26 01/28/2022 0809    BUN 21 (H) 01/28/2022 0809    CREATININE 1.6 (H) 01/28/2022 0809     (H) 01/28/2022 0809        Component Value Date/Time    CALCIUM 9.4 01/28/2022 0809    ALKPHOS 59 01/28/2022 0809    AST 17 01/28/2022 0809    ALT 15 01/28/2022 0809    BILITOT 0.4 01/28/2022 0809    ESTGFRAFRICA 42 (A) 01/28/2022 0809    EGFRNONAA 37 (A) 01/28/2022 0809          ? IMAGING   Results for orders placed or performed during the hospital encounter of 01/11/22 (from the past 2160 hour(s))   NM PET CT Routine Skull to Mid Thigh    Impression    1. Significant uptake seen in the region of the cervix, consistent with recurrent/residual disease.  2. Remaining findings as discussed above and stable when compared to the prior study.      Electronically signed by: Bello Agee DO  Date:    01/11/2022  Time:    16:40     *Note: Due to a large number of results and/or encounters for the requested time period, some results have not been displayed. A complete set of results can be found in Results Review.     No results found. However, due to the size of the patient record, not all encounters were searched. Please check Results Review for a complete set of results.    PATHOLOGY  2/2021  Station 7 lymph node, fine needle aspiration (8 smears, 1 cell block):       -  Positive for malignant cells, morphologically consistent with   metastatic  squamous cell carcinoma     Assessment/Plan:       1. Recurrent cervical cancer    2. Malignant neoplasm of endocervix    3. Secondary cancer of bone    4. Stage 3b chronic kidney disease          Plan:     # metastatic squamous cell carcinoma of the cervix SCC cervix:  history of HSIL Status post LEEP 2017. In December 2020 follow-up with gynecology with new spotty vaginal bleeding/discharge with biopsy 12/7/20 showing squamous cell carcinoma consistent with cervical primary.  MRI pelvis performed showing locally advanced disease with 4.9 cm cervical mass with right parametrial involvement and enlarged right external iliac lymph node 1.5 x 1.2 cm. PET-CT showing avidity of cervical mass with extension to lower uterine segment, right vaginal focus of avidity and lymphadenopathy including right internal external iliac, periaortic, pericaval.  There is the new hypermetabolic lymphadenopathy in right subcarinal 2.3 cm SUV 5.8. Small 9 mm right paratracheal S base slightly increased no FDG avid SUV 2.4.  01/29/2021 multidisciplinary tumor board discussion of her case with review of imaging; concern for new avid lymphadenopathy particularly in right subcarinal may be most consistent with metastatic cervical squamous cell carcinoma; tumor board recommendation for biopsy to confirm for prognostic information as well as given other concomitant malignancy to exclude alternative histology, although felt less likely metastatic breast given this is been well controlled and primarily bony disease involvement.  We discussed recommendation for systemic chemotherapy given advanced cervical squamous cell carcinoma with cisplatin, paclitaxel and bevacizumab with discontinuation of palbociclib but can continue aromatase inhibitor; taxane may also be active for her concomitant metastatic breast cancer.   - EBUS with biopsy 2/18/21, station 7 consistent with metastatic squamous cell carcinoma of cervical origin.  - STRATA requested on  cervical SCC, ERBB3 <5% felt to be subclonal per report, PIK3CA amplification, VL6124, KDM6A, FRANK, TMB low, PDL1 high may indicated sensitivity to check point inhibitor in future regimen.  - audiology exam 2/18/21, recommended that she let us know if any perceived change in her hearing throughout treatment and recommend repeat testing throughout to ensure no ototoxicity.  - [facility] germline genetic testing returned negative for mutation, provided to patient.  - cycle 1 day 1 cisplatin paclitaxel and bevacizumab on 02/22/2021  Repeat scans 04/26/2021 showed excellent improvement in thoracic and pelvic lymphadenopathy and primary cervical mass.  She is asymptomatic from this no further bleeding.  Recommended continuation of her current regimen which she is tolerating well with supportive care, IV fluids 3 times weekly per patient preference.  - restaging after cycle 6 she has continued improvement in metastatic cervical squamous cell carcinoma; avidity in left vulvar area diminished associated with known infection.  She has required substantial supportive care on chemotherapy and recommend given sustained improvement on scans continuing and absence of chemotherapy with bevacizumab alone and continue close surveillance.  She would like to continue IV fluids weekly as needed.    We reviewed in detail restaging PET-CT January 2022 notable for uptake in cervical region concerning for oligo progression/recurrence of her disease.  Previously biopsy proven metastatic cervical cancer in lungs without evidence of recurrent mass/lymphadenopathy/avidity in this region or otherwise.    We had extensive discussion regarding treatment at this juncture.  She has been on maintenance bevacizumab.  She will be seeing Gynecologic Oncology for in-person visit/pelvic exam tomorrow unclear if possible visualization/biopsy at this time however imaging is consistent with local recurrence.  Per prior Gynecologic Oncology noted local  therapy/surgery was not recommended.  Systemic therapy was recommended.  Given prior PDL1 high disease CPS >1 she may benefit from chemoimmunotherapy per Keynote 826.  Unclear if her symptoms of pelvic pressure/dysuria are related to this local recurrence.  Urinalysis without evidence of infection.  I recommend radiation oncology referral for consideration of local therapy given her current symptoms and oligo progression/recurrence for palliative purposes although she does understand limited data and unclear additional benefit of local therapy with respect to overall survival in this context.    Clinically well today with lab work no concerning findings.  Hypertension noted which she notes being concerned about her new treatment cycle 1 day 1 today will proceed and close follow-up with repeat labs and check in next Wednesday, IV fluids p.r.n..    # Hypothyroidism:  Continue levothyroxine 7/2021.  Significant elevation in TSH with normal free T4.  Short interval repeat.  No symptoms of hypothyroidism.  Will need to monitor closely if chemo immunotherapy pursued.    # neuropathy:  Mild, will monitor with re-initiation of chemotherapy per above.    # metastatic breast cancer ER positive HER2 negative:  Had been on systemic therapy with palbociclib and letrozole. While on systemic chemo due to c/f cytopenias/toxicity palbociclib is on hold. We have discussed this extensively at our visit. She is to continue on hormone lowering tx letrozole.       # bony metastatic disease:  on zoledronic acid dosed every 12 weeks. - Last zometa 9/14/21  DEXA 8/2021 without bone loss   Recent PET scan January 2022 without evidence of new bony metastatic disease.  She feels most comfortable with maintenance q.3 months which is reasonable.  Resolution of ENT/left ear infection.  Will released from hold.  Continue calcium vitamin-D supplementation.    # history of papillary thyroid carcinoma status post total thyroidectomy on 08/31/2017:  s/p total thyroidectomy on levothyroxine.     # tobacco abuse:  Current tobacco use.   I have counseled for at least 3 min on the importance of tobacco cessation and discussed pharmacologic and therapy options to  aid in cessation.  She is interested in trial of bupropion prescribed to her local pharmacy.    Follow-Up:   Cycle 1 day 1 carboplatin paclitaxel and pembrolizumab today  Revisit next Wednesday with CBC CMP and NP visit possible IV fluids as needed  Revisit 3 weeks for cycle 2 day 1 planned with CBC CMP TSH free T4 prior

## 2022-01-31 DIAGNOSIS — M25.562 LEFT KNEE PAIN, UNSPECIFIED CHRONICITY: Primary | ICD-10-CM

## 2022-02-01 NOTE — PROGRESS NOTES
Subjective:      DATE OF VISIT: 2/2/2022   ?   ?   Patient ID:?Josey Flores is a 51 y.o. female.?? MR#: [id number]   ?   PRIMARY PROVIDER: Dr. Estefani Asencio   ?   CHIEF COMPLAINT: one week post chemo f/u?????   ?   ONCOLOGIC DIAGNOSIS: Stage IV cervical squamous cell carcinoma     stage IV, T2 N1b M1, invasive breast carcinoma, ER/[place]+, HER2-     Papillary thyroid carcinoma status post total thyroidectomy on 08/31/2017, mpT1b N0      Cervical HSIL MIREILLE 3 s/p LEEP, 2017  ?   CURRENT TREATMENT: Letrozole; Carboplatin, paclitaxel and pembrolizumab, C1D1 1/28/22    PAST TREATMENT: Palliative chemotherapy: cisplatin, paclitaxel and bevacizumab; 02/22/2021 cycle 1 day 1 -> bevacizumab maintenance after 6 cycles  Palbociclib and letrozole (palbociclib held with ongoing chemotherapy due to concern for cytopenias)    The patient location is: [facility]  The chief complaint leading to consultation is: One week f/u post chemo    Visit type: audiovisual    Face to Face time with patient: 15    60 minutes of total time spent on the encounter, which includes face to face time and non-face to face time preparing to see the patient (eg, review of tests), Obtaining and/or reviewing separately obtained history, Documenting clinical information in the electronic or other health record, Independently interpreting results (not separately reported) and communicating results to the patient/family/caregiver, or Care coordination (not separately reported).       Each patient to whom he or she provides medical services by telemedicine is:  (1) informed of the relationship between the physician and patient and the respective role of any other health care provider with respect to management of the patient; and (2) notified that he or she may decline to receive medical services by telemedicine and may withdraw from such care at any time.       INTERVAL EVENTS     She presents today with  by virtual visit for one week follow-up after  "initiation of new treatment (carbo/taxol/keytruda) for recurrent cervical carcinoma.  Pt c/o of itching rash to bilateral arms and knees, but overall she is feeling well. Pt states,"I am surprised at how good I feel!" She denies NVDC, pain, dysuria. She has been eating well and doing her best to remain hydrated. Palbociclib to remain on hold.          Review of Systems    ?   A comprehensive 14-point review of systems was reviewed with patient and was negative other than as specified above.   ?     Objective:      Physical Exam      ?   Vitals:    02/02/22 1049   BP: (!) 154/96   Pulse: 87   Temp: 97.7 °F (36.5 °C)      ?   ECOG:?1   General appearance: Generally well appearing, in no acute distress.   Head, eyes, ears, nose, and throat: Oropharynx clear with moist mucous membranes.   Cardiovascular: Regular rate and rhythm, S1, S2, no audible murmurs.   Respiratory: Lungs clear to auscultation bilaterally.   Abdomen: nontender, nondistended.   Extremities: Warm, without edema.   Neurologic: Alert and oriented. Grossly normal strength, coordination, and gait.   Skin: No rashes, ecchymoses or petechial lesion.   Psychiatric: normal mood and affect, conversant and appropriate    ?   Laboratory:  ?   Lab Visit on 02/02/2022   Component Date Value Ref Range Status    WBC 02/02/2022 5.32  3.90 - 12.70 K/uL Final    RBC 02/02/2022 3.69* 4.00 - 5.40 M/uL Final    Hemoglobin 02/02/2022 12.9  12.0 - 16.0 g/dL Final    Hematocrit 02/02/2022 38.5  37.0 - 48.5 % Final    MCV 02/02/2022 104* 82 - 98 fL Final    MCH 02/02/2022 35.0* 27.0 - 31.0 pg Final    MCHC 02/02/2022 33.5  32.0 - 36.0 g/dL Final    RDW 02/02/2022 17.0* 11.5 - 14.5 % Final    Platelets 02/02/2022 208  150 - 450 K/uL Final    MPV 02/02/2022 11.6  9.2 - 12.9 fL Final    Immature Granulocytes 02/02/2022 3.0* 0.0 - 0.5 % Final    Gran # (ANC) 02/02/2022 3.4  1.8 - 7.7 K/uL Final    Immature Grans (Abs) 02/02/2022 0.16* 0.00 - 0.04 K/uL Final    " Lymph # 02/02/2022 1.3  1.0 - 4.8 K/uL Final    Mono # 02/02/2022 0.0* 0.3 - 1.0 K/uL Final    Eos # 02/02/2022 0.4  0.0 - 0.5 K/uL Final    Baso # 02/02/2022 0.02  0.00 - 0.20 K/uL Final    nRBC 02/02/2022 0  0 /100 WBC Final    Gran % 02/02/2022 63.3  38.0 - 73.0 % Final    Lymph % 02/02/2022 24.4  18.0 - 48.0 % Final    Mono % 02/02/2022 0.8* 4.0 - 15.0 % Final    Eosinophil % 02/02/2022 8.1* 0.0 - 8.0 % Final    Basophil % 02/02/2022 0.4  0.0 - 1.9 % Final    Differential Method 02/02/2022 Automated   Final    Sodium 02/02/2022 140  136 - 145 mmol/L Final    Potassium 02/02/2022 4.7  3.5 - 5.1 mmol/L Final    Chloride 02/02/2022 100  95 - 110 mmol/L Final    CO2 02/02/2022 28  23 - 29 mmol/L Final    Glucose 02/02/2022 93  70 - 110 mg/dL Final    BUN 02/02/2022 32* 6 - 20 mg/dL Final    Creatinine 02/02/2022 1.2  0.5 - 1.4 mg/dL Final    Calcium 02/02/2022 9.0  8.7 - 10.5 mg/dL Final    Total Protein 02/02/2022 7.1  6.0 - 8.4 g/dL Final    Albumin 02/02/2022 3.8  3.5 - 5.2 g/dL Final    Total Bilirubin 02/02/2022 0.6  0.1 - 1.0 mg/dL Final    Alkaline Phosphatase 02/02/2022 42* 55 - 135 U/L Final    AST 02/02/2022 58* 10 - 40 U/L Final    ALT 02/02/2022 102* 10 - 44 U/L Final    Anion Gap 02/02/2022 12  8 - 16 mmol/L Final    eGFR if  02/02/2022 60  >60 mL/min/1.73 m^2 Final    eGFR if non African American 02/02/2022 52* >60 mL/min/1.73 m^2 Final      ?   ?   Assessment/Plan:     Problem List Items Addressed This Visit        Derm    Pruritus - Primary     -Diphenhydramine 25mg IV with IVFs  -Hydroxyzine 25 mg tid prn itching for home         Relevant Medications    hydrOXYzine HCL (ATARAX) 25 MG tablet       Oncology    Malignant neoplasm of endocervix     -Labs with no concerning findings  -Discussed case with primary Oncologist   -Proceed with 1L NS bolus toay    -Scheduled f/u with Dr. Asencio 02/18/22 with labs and chemo                    Follow-Up:   In 2 weeks  with CBC, CMP, TSH, Free T4 with Dr. Asencio for cycle 2 day 1 keytruda/carbo/taxol.      ANA PAULA Marcus

## 2022-02-02 ENCOUNTER — SOCIAL WORK (OUTPATIENT)
Dept: HEMATOLOGY/ONCOLOGY | Facility: CLINIC | Age: 52
End: 2022-02-02
Payer: MEDICAID

## 2022-02-02 ENCOUNTER — OFFICE VISIT (OUTPATIENT)
Dept: HEMATOLOGY/ONCOLOGY | Facility: CLINIC | Age: 52
End: 2022-02-02
Payer: MEDICAID

## 2022-02-02 ENCOUNTER — INFUSION (OUTPATIENT)
Dept: INFUSION THERAPY | Facility: HOSPITAL | Age: 52
End: 2022-02-02
Attending: INTERNAL MEDICINE
Payer: MEDICAID

## 2022-02-02 ENCOUNTER — PATIENT MESSAGE (OUTPATIENT)
Dept: HEMATOLOGY/ONCOLOGY | Facility: CLINIC | Age: 52
End: 2022-02-02
Payer: MEDICAID

## 2022-02-02 VITALS
HEART RATE: 70 BPM | TEMPERATURE: 98 F | RESPIRATION RATE: 18 BRPM | SYSTOLIC BLOOD PRESSURE: 129 MMHG | DIASTOLIC BLOOD PRESSURE: 79 MMHG | OXYGEN SATURATION: 98 %

## 2022-02-02 VITALS
TEMPERATURE: 98 F | WEIGHT: 212.06 LBS | HEIGHT: 67 IN | OXYGEN SATURATION: 98 % | BODY MASS INDEX: 33.28 KG/M2 | HEART RATE: 87 BPM | SYSTOLIC BLOOD PRESSURE: 154 MMHG | DIASTOLIC BLOOD PRESSURE: 96 MMHG

## 2022-02-02 DIAGNOSIS — L29.9 PRURITUS: ICD-10-CM

## 2022-02-02 DIAGNOSIS — T45.1X5A CHEMOTHERAPY-INDUCED NAUSEA: Primary | ICD-10-CM

## 2022-02-02 DIAGNOSIS — C79.51 SECONDARY CANCER OF BONE: ICD-10-CM

## 2022-02-02 DIAGNOSIS — R11.0 CHEMOTHERAPY-INDUCED NAUSEA: Primary | ICD-10-CM

## 2022-02-02 DIAGNOSIS — C53.0 MALIGNANT NEOPLASM OF ENDOCERVIX: ICD-10-CM

## 2022-02-02 DIAGNOSIS — L29.9 PRURITUS: Primary | ICD-10-CM

## 2022-02-02 PROCEDURE — 96375 TX/PRO/DX INJ NEW DRUG ADDON: CPT

## 2022-02-02 PROCEDURE — 96360 HYDRATION IV INFUSION INIT: CPT

## 2022-02-02 PROCEDURE — 3080F DIAST BP >= 90 MM HG: CPT | Mod: CPTII,95,,

## 2022-02-02 PROCEDURE — 96374 THER/PROPH/DIAG INJ IV PUSH: CPT

## 2022-02-02 PROCEDURE — 3080F PR MOST RECENT DIASTOLIC BLOOD PRESSURE >= 90 MM HG: ICD-10-PCS | Mod: CPTII,95,,

## 2022-02-02 PROCEDURE — 3077F PR MOST RECENT SYSTOLIC BLOOD PRESSURE >= 140 MM HG: ICD-10-PCS | Mod: CPTII,95,,

## 2022-02-02 PROCEDURE — 3008F PR BODY MASS INDEX (BMI) DOCUMENTED: ICD-10-PCS | Mod: CPTII,95,,

## 2022-02-02 PROCEDURE — 3077F SYST BP >= 140 MM HG: CPT | Mod: CPTII,95,,

## 2022-02-02 PROCEDURE — 96361 HYDRATE IV INFUSION ADD-ON: CPT

## 2022-02-02 PROCEDURE — 36593 DECLOT VASCULAR DEVICE: CPT

## 2022-02-02 PROCEDURE — 25000003 PHARM REV CODE 250: Performed by: INTERNAL MEDICINE

## 2022-02-02 PROCEDURE — 99214 PR OFFICE/OUTPT VISIT, EST, LEVL IV, 30-39 MIN: ICD-10-PCS | Mod: 95,,,

## 2022-02-02 PROCEDURE — 63600175 PHARM REV CODE 636 W HCPCS

## 2022-02-02 PROCEDURE — 99214 OFFICE O/P EST MOD 30 MIN: CPT | Mod: 95,,,

## 2022-02-02 PROCEDURE — 3008F BODY MASS INDEX DOCD: CPT | Mod: CPTII,95,,

## 2022-02-02 PROCEDURE — 63600175 PHARM REV CODE 636 W HCPCS: Mod: JG | Performed by: INTERNAL MEDICINE

## 2022-02-02 RX ORDER — HEPARIN 100 UNIT/ML
500 SYRINGE INTRAVENOUS
Status: CANCELLED | OUTPATIENT
Start: 2022-02-02

## 2022-02-02 RX ORDER — SODIUM CHLORIDE 0.9 % (FLUSH) 0.9 %
10 SYRINGE (ML) INJECTION
Status: DISCONTINUED | OUTPATIENT
Start: 2022-02-02 | End: 2022-02-02 | Stop reason: HOSPADM

## 2022-02-02 RX ORDER — DIPHENHYDRAMINE HYDROCHLORIDE 50 MG/ML
25 INJECTION INTRAMUSCULAR; INTRAVENOUS
Status: CANCELLED
Start: 2022-02-02

## 2022-02-02 RX ORDER — HEPARIN 100 UNIT/ML
500 SYRINGE INTRAVENOUS
Status: DISCONTINUED | OUTPATIENT
Start: 2022-02-02 | End: 2022-02-02 | Stop reason: HOSPADM

## 2022-02-02 RX ORDER — DIPHENHYDRAMINE HYDROCHLORIDE 50 MG/ML
25 INJECTION INTRAMUSCULAR; INTRAVENOUS
Status: COMPLETED | OUTPATIENT
Start: 2022-02-02 | End: 2022-02-02

## 2022-02-02 RX ORDER — SODIUM CHLORIDE 0.9 % (FLUSH) 0.9 %
10 SYRINGE (ML) INJECTION
Status: CANCELLED | OUTPATIENT
Start: 2022-02-02

## 2022-02-02 RX ORDER — HYDROXYZINE HYDROCHLORIDE 25 MG/1
25 TABLET, FILM COATED ORAL 3 TIMES DAILY PRN
Qty: 90 TABLET | Refills: 1 | Status: SHIPPED | OUTPATIENT
Start: 2022-02-02

## 2022-02-02 RX ORDER — SODIUM CHLORIDE 9 MG/ML
1000 INJECTION, SOLUTION INTRAVENOUS
Status: COMPLETED | OUTPATIENT
Start: 2022-02-02 | End: 2022-02-02

## 2022-02-02 RX ADMIN — ALTEPLASE 2 MG: 2.2 INJECTION, POWDER, LYOPHILIZED, FOR SOLUTION INTRAVENOUS at 11:02

## 2022-02-02 RX ADMIN — Medication 500 UNITS: at 01:02

## 2022-02-02 RX ADMIN — SODIUM CHLORIDE 1000 ML: 0.9 INJECTION, SOLUTION INTRAVENOUS at 12:02

## 2022-02-02 RX ADMIN — DIPHENHYDRAMINE HYDROCHLORIDE 25 MG: 50 INJECTION INTRAMUSCULAR; INTRAVENOUS at 12:02

## 2022-02-02 NOTE — ASSESSMENT & PLAN NOTE
-Labs with no concerning findings  -Discussed case with primary Oncologist   -Proceed with 1L NS bolus toay    -Scheduled f/u with Dr. Asencio 02/18/22 with labs and chemo

## 2022-02-02 NOTE — PLAN OF CARE
Problem: Adult Inpatient Plan of Care  Goal: Plan of Care Review  Outcome: Ongoing, Progressing  Flowsheets (Taken 2/2/2022 1158)  Plan of Care Reviewed With:   patient   spouse  Goal: Patient-Specific Goal (Individualized)  Outcome: Ongoing, Progressing  Flowsheets (Taken 2/2/2022 1158)  Anxieties, Fears or Concerns: Pt concerned about port not giving blood return  Individualized Care Needs: Feet elevated, reclined,  at side  Patient-Specific Goals (Include Timeframe): Tolerate fluids today  Goal: Optimal Comfort and Wellbeing  Outcome: Ongoing, Progressing  Intervention: Provide Person-Centered Care  Flowsheets (Taken 2/2/2022 1158)  Trust Relationship/Rapport:   care explained   reassurance provided   choices provided   thoughts/feelings acknowledged   emotional support provided   empathic listening provided   questions answered   questions encouraged

## 2022-02-04 ENCOUNTER — HOSPITAL ENCOUNTER (OUTPATIENT)
Dept: RADIOLOGY | Facility: HOSPITAL | Age: 52
Discharge: HOME | End: 2022-02-04
Attending: FAMILY MEDICINE
Payer: MEDICAID

## 2022-02-04 ENCOUNTER — OFFICE VISIT (OUTPATIENT)
Dept: SPORTS MEDICINE | Facility: CLINIC | Age: 52
End: 2022-02-04
Payer: MEDICAID

## 2022-02-04 VITALS — WEIGHT: 212.06 LBS | BODY MASS INDEX: 33.28 KG/M2 | HEIGHT: 67 IN

## 2022-02-04 DIAGNOSIS — F41.9 ANXIETY: ICD-10-CM

## 2022-02-04 DIAGNOSIS — M17.0 OSTEOARTHRITIS OF BOTH KNEES, UNSPECIFIED OSTEOARTHRITIS TYPE: Primary | ICD-10-CM

## 2022-02-04 DIAGNOSIS — C79.51 SECONDARY CANCER OF BONE: ICD-10-CM

## 2022-02-04 DIAGNOSIS — G89.3 NEOPLASM RELATED PAIN: ICD-10-CM

## 2022-02-04 DIAGNOSIS — C50.811 MALIGNANT NEOPLASM OF OVERLAPPING SITES OF RIGHT BREAST IN FEMALE, ESTROGEN RECEPTOR POSITIVE: ICD-10-CM

## 2022-02-04 DIAGNOSIS — F41.1 GENERALIZED ANXIETY DISORDER: ICD-10-CM

## 2022-02-04 DIAGNOSIS — M25.562 LEFT KNEE PAIN, UNSPECIFIED CHRONICITY: ICD-10-CM

## 2022-02-04 DIAGNOSIS — F32.A DEPRESSION, UNSPECIFIED DEPRESSION TYPE: ICD-10-CM

## 2022-02-04 DIAGNOSIS — Z17.0 MALIGNANT NEOPLASM OF OVERLAPPING SITES OF RIGHT BREAST IN FEMALE, ESTROGEN RECEPTOR POSITIVE: ICD-10-CM

## 2022-02-04 DIAGNOSIS — M17.0 PRIMARY OSTEOARTHRITIS OF BOTH KNEES: Primary | ICD-10-CM

## 2022-02-04 DIAGNOSIS — C77.3 MALIGNANT NEOPLASM METASTATIC TO LYMPH NODE OF AXILLA: ICD-10-CM

## 2022-02-04 PROCEDURE — 99999 PR PBB SHADOW E&M-EST. PATIENT-LVL IV: ICD-10-PCS | Mod: PBBFAC,,, | Performed by: FAMILY MEDICINE

## 2022-02-04 PROCEDURE — 99214 PR OFFICE/OUTPT VISIT, EST, LEVL IV, 30-39 MIN: ICD-10-PCS | Mod: S$PBB,,, | Performed by: FAMILY MEDICINE

## 2022-02-04 PROCEDURE — 73564 XR KNEE ORTHO LEFT WITH FLEXION: ICD-10-PCS | Mod: 26,LT,, | Performed by: RADIOLOGY

## 2022-02-04 PROCEDURE — 3008F BODY MASS INDEX DOCD: CPT | Mod: CPTII,,, | Performed by: FAMILY MEDICINE

## 2022-02-04 PROCEDURE — 99214 OFFICE O/P EST MOD 30 MIN: CPT | Mod: S$PBB,,, | Performed by: FAMILY MEDICINE

## 2022-02-04 PROCEDURE — 3008F PR BODY MASS INDEX (BMI) DOCUMENTED: ICD-10-PCS | Mod: CPTII,,, | Performed by: FAMILY MEDICINE

## 2022-02-04 PROCEDURE — 99214 OFFICE O/P EST MOD 30 MIN: CPT | Mod: PBBFAC | Performed by: FAMILY MEDICINE

## 2022-02-04 PROCEDURE — 73562 X-RAY EXAM OF KNEE 3: CPT | Mod: 26,RT,, | Performed by: RADIOLOGY

## 2022-02-04 PROCEDURE — 99999 PR PBB SHADOW E&M-EST. PATIENT-LVL IV: CPT | Mod: PBBFAC,,, | Performed by: FAMILY MEDICINE

## 2022-02-04 PROCEDURE — 1159F PR MEDICATION LIST DOCUMENTED IN MEDICAL RECORD: ICD-10-PCS | Mod: CPTII,,, | Performed by: FAMILY MEDICINE

## 2022-02-04 PROCEDURE — 1159F MED LIST DOCD IN RCRD: CPT | Mod: CPTII,,, | Performed by: FAMILY MEDICINE

## 2022-02-04 PROCEDURE — 73562 X-RAY EXAM OF KNEE 3: CPT | Mod: TC,RT,59

## 2022-02-04 PROCEDURE — 73564 X-RAY EXAM KNEE 4 OR MORE: CPT | Mod: 26,LT,, | Performed by: RADIOLOGY

## 2022-02-04 PROCEDURE — 73562 XR KNEE ORTHO LEFT WITH FLEXION: ICD-10-PCS | Mod: 26,RT,, | Performed by: RADIOLOGY

## 2022-02-04 RX ORDER — LETROZOLE 2.5 MG/1
2.5 TABLET, FILM COATED ORAL DAILY
Qty: 90 TABLET | Refills: 3 | Status: SHIPPED | OUTPATIENT
Start: 2022-02-04 | End: 2022-09-23

## 2022-02-04 NOTE — PROGRESS NOTES
Subjective:     Patient ID: Josey Flores is a 51 y.o. female.    Chief Complaint: Pain of the Left Knee      HPI:  Patient with several comorbidities including several types of cancer and apparently at least 1 metastatic cancer.    Complains of bilateral knee pain worse in the left for several months and getting worse. - says that standing from sitting position is very difficult and painful and her knees are stiff for several minutes and she has more pain when trying to walk.    She has a new grandchild coming to town she wants to be able to visit with.    She thought gabapentin was making her worse and read about side effects and discontinued it but I did ask her to talk to her prescribing physician about this.    No recent falls and no locking of the joint.  No specific injury to the knee.  Was told several years ago that she had arthritis in the knees.      Past Medical History:   Diagnosis Date    Anxiety     Asthma     Breast cancer 2017    Cancer     right breast, metastatic-lymph nodes, bone, and thyroid     COPD (chronic obstructive pulmonary disease)     Depression     History of psychiatric hospitalization     2000; suicidal ideation    Hx of psychiatric care     Hypothyroidism     Miscarriage     five miscarriages    Obesity     Psychiatric problem     Thyroid cancer 2017     Past Surgical History:   Procedure Laterality Date    ABSCESS DRAINAGE      bilateral axilla    ADENOIDECTOMY      APPENDECTOMY      BREAST BIOPSY Right     x3    CHOLECYSTECTOMY      ENDOBRONCHIAL ULTRASOUND Bilateral 2/18/2021    Procedure: ENDOBRONCHIAL ULTRASOUND (EBUS);  Surgeon: Jaron Ni MD;  Location: [facility] ENDO;  Service: Pulmonary;  Laterality: Bilateral;    FLUOROSCOPY N/A 1/28/2021    Procedure: Mediport placement;  Surgeon: Noah Phelan MD;  Location: [facility] CATH LAB;  Service: General;  Laterality: N/A;    MASS EXCISION      MEDIPORT INSERTION, SINGLE Right 02/2021    SKIN GRAFT   2017    THYROIDECTOMY Bilateral     TONSILLECTOMY       Family History   Problem Relation Age of Onset    Arthritis Mother     Early death Mother         64 at time of death    Heart attack Mother     Heart disease Mother     Heart failure Mother     Hypertension Mother     Coronary artery disease Father     Hearing loss Father     Heart disease Father     Hyperlipidemia Father     Hypertension Father     Mental illness Father     Learning disabilities Son     Cancer Maternal Aunt      Social History     Socioeconomic History    Marital status:     Number of children: 2   Tobacco Use    Smoking status: Current Every Day Smoker     Packs/day: 1.00     Years: 32.00     Pack years: 32.00     Types: Cigarettes     Start date: 1985     Last attempt to quit: 2017     Years since quittin.6    Smokeless tobacco: Never Used    Tobacco comment: 45 pack year history   Substance and Sexual Activity    Alcohol use: No    Drug use: No    Sexual activity: Yes     Partners: Male   Social History Narrative     with two adult children; common law marriage (10+years); raised [employer], no current [employer] practice       Current Outpatient Medications:     ALPRAZolam (XANAX) 1 MG tablet, Take 0.5-1 tablets (0.5-1 mg total) by mouth 2 (two) times daily as needed for Anxiety., Disp: 60 tablet, Rfl: 0    buPROPion (WELLBUTRIN SR) 150 MG TBSR 12 hr tablet, Take 1 tablet (150 mg total) by mouth 2 (two) times daily. Take 1 tablet (150mg) once daily for 1 week then increase to twice daily, Disp: 60 tablet, Rfl: 0    calcium carbonate 400 mg calcium (1,000 mg) Chew, Take 2,000 mg by mouth once daily., Disp: , Rfl:     cyanocobalamin (VITAMIN B-12) 1000 MCG tablet, Take 1 tablet (1,000 mcg total) by mouth once daily., Disp: 90 tablet, Rfl: 3    dexAMETHasone (DECADRON) 4 MG Tab, Take 2 tablets (8 mg total) by mouth once daily. Take as directed on days 2, 3, and 4 of your  chemotherapy cycle., Disp: 24 tablet, Rfl: 0    diphenoxylate-atropine 2.5-0.025 mg (LOMOTIL) 2.5-0.025 mg per tablet, Take 1 tablet by mouth 4 (four) times daily as needed for Diarrhea., Disp: 30 tablet, Rfl: 1    ergocalciferol (VITAMIN D2) 50,000 unit Cap, Take 5,000 Units by mouth once daily at 6am. , Disp: , Rfl:     HYDROcodone-acetaminophen (NORCO)  mg per tablet, Take 1 tablet by mouth every 4 (four) hours as needed for Pain. No more than 4 doses/ day, Disp: 180 tablet, Rfl: 0    hydrOXYzine HCL (ATARAX) 25 MG tablet, Take 1 tablet (25 mg total) by mouth 3 (three) times daily as needed for Itching., Disp: 90 tablet, Rfl: 1    ipratropium (ATROVENT) 42 mcg (0.06 %) nasal spray, 2 sprays by Nasal route 4 (four) times daily. As needed for rhinitis. Take in evening before bed and in the morning, Disp: 15 mL, Rfl: 11    magnesium oxide (MAGOX) 400 mg (241.3 mg magnesium) tablet, Take 1 tablet (400 mg total) by mouth once daily., Disp: 5 tablet, Rfl: 1    morphine (MS CONTIN) 15 MG 12 hr tablet, Take 1 tablet (15 mg total) by mouth every 12 (twelve) hours., Disp: 60 tablet, Rfl: 0    multivitamin (THERAGRAN) per tablet, Take 1 tablet by mouth once daily., Disp: , Rfl:     OLANZapine (ZYPREXA) 5 MG tablet, Take 1 tablet (5 mg total) by mouth nightly. Take on evenings of chemotherapy days 1, 2, 3 and 4, Disp: 12 tablet, Rfl: 2    OLANZapine (ZYPREXA) 5 MG tablet, Take 1 tablet (5 mg total) by mouth every evening. Take as directed on days 1-4 of your chemotherapy cycle. May increase to 10 mg if breakthrough nausea occurs., Disp: 30 tablet, Rfl: 2    potassium chloride SA (K-DUR,KLOR-CON) 20 MEQ tablet, Take 1 tablet (20 mEq total) by mouth once daily. Take daily for 3 days., Disp: 3 tablet, Rfl: 1    prochlorperazine (COMPAZINE) 5 MG tablet, Take 1 tablet (5 mg total) by mouth every 6 (six) hours as needed for Nausea., Disp: 30 tablet, Rfl: 1    albuterol (PROVENTIL) 2.5 mg /3 mL (0.083 %)  nebulizer solution, Take 3 mLs (2.5 mg total) by nebulization every 4 to 6 hours as needed for Wheezing or Shortness of Breath. (Patient not taking: Reported on 2/4/2022), Disp: 360 mL, Rfl: 11    albuterol (PROVENTIL/VENTOLIN HFA) 90 mcg/actuation inhaler, Inhale 2 puffs into the lungs every 4 (four) hours as needed for Wheezing or Shortness of Breath. (Patient not taking: Reported on 2/4/2022), Disp: 18 g, Rfl: 11    letrozole (FEMARA) 2.5 mg Tab, Take 1 tablet (2.5 mg) by mouth once daily. (Patient not taking: Reported on 2/4/2022.), Disp: 90 tablet, Rfl: 3    levothyroxine (EUTHYROX) 175 MCG tablet, Take 1 tablet (175 mcg total) by mouth once daily., Disp: 30 tablet, Rfl: 2    ondansetron (ZOFRAN-ODT) 8 MG TbDL, Take 1 tablet (8 mg total) by mouth every 8 (eight) hours as needed (nausea/vomiting). (Patient not taking: Reported on 2/4/2022), Disp: 60 tablet, Rfl: 5    palbociclib (IBRANCE) 125 mg Cap, Take one capsule (125 mg) by mouth once daily on days 1-21 of each 28-day cycle. (Patient not taking: Reported on 2/4/2022.), Disp: 21 capsule, Rfl: 11  Review of patient's allergies indicates:   Allergen Reactions    Gabapentin Swelling     Note: unilateral joint edema, unclear if due to gabapentin    Nsaids (non-steroidal anti-inflammatory drug) Other (See Comments)     Instructed not to take NSAID drugs with chemo pill.    Clindamycin Rash    Vancomycin analogues Itching     Review of Systems   Constitutional: Negative for chills, fever and weight loss.   Respiratory: Negative for shortness of breath.    Cardiovascular: Negative for chest pain and palpitations.       Objective:   Body mass index is 33.22 kg/m².  There were no vitals filed for this visit.        Ortho/SPM Exam -alert and oriented well-nourished well-developed ambulatory in no acute distress    Respiratory effort is normal    Knees bilateral-no acute deformity but chronic bony changes noted with definite point tenderness to the medial  joint lines.    Range of motion 0-100 degrees    Strength 3 to 4/5    Minimal varus and valgus laxity and negative Lachman's    Patient has obvious pain when trying to stand and walk from sitting position    Neurovascular intact    Psychiatric good affect cognition    Plan-physical therapy at [employer].  Voltaren gel and ice.  Order Euflexxa for both arthritic knees  MEDICAL NECESSITY FOR VISCOSUPPLEMENTATION: After thorough evaluation of the patient, I have determined that visco-supplementation is medically necessary. The patient has painful DJD of the knee with failure of conservative therapy including lifestyle modifications and rehabilitation exercises. Oral analgesis/NSAIDs have not adequately controlled symptoms and there is radiographic evidence of joint space narrowing, subchondral sclerosis, and some early osteophytic changes, or in lack of radiographic evidence, there is arthroscopic or other evidence of chondrosis.  Kellgren- Abelino grade 2 or greater.   IMAGING: X-ray Knee Ortho Left with Flexion  Narrative: EXAMINATION:  XR KNEE ORTHO LEFT WITH FLEXION    CLINICAL HISTORY:  . Pain in left knee    TECHNIQUE:  AP standing of both knees, PA flexion standing views of both knees, and Merchant views of both knees were performed. A lateral of the left knee was also performed.    COMPARISON:  None    FINDINGS:  There is no radiographic evidence of acute osseous, articular, or soft tissue abnormality.  There are tricompartmental marginal osteophytes present bilaterally with possible mild narrowing of the lateral compartments.  Impression: Degenerative findings as above    Electronically signed by: Braeden Bernard MD  Date:    02/04/2022  Time:    11:06       Radiographs / Imaging : Relevant imaging results reviewed by me and interpreted by me, discussed with the patient and / or family -x-rays reviewed by me and agree with report and discussed in viewed with patient today.      Assessment:     Encounter  Diagnoses   Name Primary?    Primary osteoarthritis of both knees Yes    Generalized anxiety disorder     Neoplasm related pain     Secondary cancer of bone         Plan:   Primary osteoarthritis of both knees    Generalized anxiety disorder    Neoplasm related pain    Secondary cancer of bone        The patient verbalized good understanding of the medical issues discussed today and expressed appreciation for the care provided.  Patient was given the opportunity to ask questions and be an active participant in their medical care. Patient had no further questions or concerns at this time.     The patient was encouraged to participate in appropriate physical activity.      Dakota Reina M.D.  [facility]        This note was dictated using voice recognition software and may have sound a like errors.

## 2022-02-04 NOTE — PATIENT INSTRUCTIONS
Ice To knees for 15 minutes as needed for pain or swelling    Voltaren gel over-the-counter to knee joints 3 to 4 times a day    Recheck about 4 weeks to start Visco injections    Start physical therapy at [place]

## 2022-02-07 NOTE — PROGRESS NOTES
Pt requested to speak with SW regarding financial assistance. SW researched various financial assistance grants that this current SW has applied pt for in the past. It was determined pt had been [facility]. SW will apply pt for CAG[employer] and [place].

## 2022-02-09 ENCOUNTER — SPECIALTY PHARMACY (OUTPATIENT)
Dept: PHARMACY | Facility: CLINIC | Age: 52
End: 2022-02-09
Payer: MEDICAID

## 2022-02-09 ENCOUNTER — CLINICAL SUPPORT (OUTPATIENT)
Dept: REHABILITATION | Facility: HOSPITAL | Age: 52
End: 2022-02-09
Payer: MEDICAID

## 2022-02-09 ENCOUNTER — SPECIALTY PHARMACY (OUTPATIENT)
Dept: PHARMACY | Facility: CLINIC | Age: 52
End: 2022-02-09

## 2022-02-09 ENCOUNTER — PATIENT MESSAGE (OUTPATIENT)
Dept: HEMATOLOGY/ONCOLOGY | Facility: CLINIC | Age: 52
End: 2022-02-09
Payer: MEDICAID

## 2022-02-09 DIAGNOSIS — G89.29 CHRONIC PAIN OF BOTH KNEES: ICD-10-CM

## 2022-02-09 DIAGNOSIS — M25.562 CHRONIC PAIN OF BOTH KNEES: ICD-10-CM

## 2022-02-09 DIAGNOSIS — M17.0 OSTEOARTHRITIS OF BOTH KNEES, UNSPECIFIED OSTEOARTHRITIS TYPE: ICD-10-CM

## 2022-02-09 DIAGNOSIS — C50.811 MALIGNANT NEOPLASM OF OVERLAPPING SITES OF RIGHT BREAST IN FEMALE, ESTROGEN RECEPTOR POSITIVE: ICD-10-CM

## 2022-02-09 DIAGNOSIS — Z17.0 MALIGNANT NEOPLASM OF OVERLAPPING SITES OF RIGHT BREAST IN FEMALE, ESTROGEN RECEPTOR POSITIVE: ICD-10-CM

## 2022-02-09 DIAGNOSIS — M25.561 CHRONIC PAIN OF BOTH KNEES: ICD-10-CM

## 2022-02-09 PROCEDURE — 97110 THERAPEUTIC EXERCISES: CPT

## 2022-02-09 PROCEDURE — 97161 PT EVAL LOW COMPLEX 20 MIN: CPT

## 2022-02-09 NOTE — TELEPHONE ENCOUNTER
Mrs. Flores declined refill, stating chemo is on hold. Per VINNY Rivera note on 2/2, palbociclib held with ongoing chemotherapy due to concern for cytopenias. Is getting carboplatin, paclitaxel and pembrolizumab--C1D1 was 1/28/22. Per Dr. Luna note on 12/28, We discussed recommendation for systemic chemotherapy given advanced cervical squamous cell carcinoma with cisplatin, paclitaxel and bevacizumab with discontinuation of palbociclib but can continue aromatase inhibitor; taxane may also be active for her concomitant metastatic breast cancer. This regimen was given for up to 35 cycles in the KEYNOTE-826 trial.   [employer] message sent to VINNY Prieto and Dr. Asencio to inquire into possible number of cycles of carboplatin-paclitaxel-pembrolizumab to pend palbociclib refill.    Care plan opened. Will pend palbociclib refill to 2/11.

## 2022-02-09 NOTE — PLAN OF CARE
[facility]   Physical Therapy Initial Evaluation     Date: 2/9/2022   Name: Josey Schmitz [facility] Number: [id number]    Therapy Diagnosis:   Encounter Diagnoses   Name Primary?    Osteoarthritis of both knees, unspecified osteoarthritis type     Chronic pain of both knees      Physician: Dakota Reina MD    Physician Orders: PT Eval and Treat   Medical Diagnosis from Referral: Osteoarthritis of both knees   Evaluation Date: 2/9/2022  Authorization Period Expiration: 2/4/2023  Plan of Care Expiration: 5/1/2022  Progress Note Due: 10th visit  Visit # / Visits authorized: 1/ 12   FOTO: 1/ 3     Precautions: Standard and Fall    Time In: 2:00p  Time Out: 3:00p  Total Appointment Time (timed & untimed codes): 60 minutes      SUBJECTIVE   Date of onset: Patient with several comorbidities including several types of cancer and apparently at least 1 metastatic cancer.     History of current condition - Josey reports: 2018  Complains of bilateral knee pain worse in the right for several months and getting worse. - says that standing from sitting position is very difficult and painful and her knees are stiff for several minutes and she has more pain when trying to stand from chair and walk.  Pt is currently receiving infusion chemo treatments with radiation treatments to begin soon.  Pt reports dizziness and light headedness due to chemo treatment    Falls: daily    Imaging, bone scan films: FINDINGS:  There is no radiographic evidence of acute osseous, articular, or soft tissue abnormality.  There are tricompartmental marginal osteophytes present bilaterally with possible mild narrowing of the lateral compartments.  Impression: Degenerative findings as above      Prior Therapy: yes  Social History:  lives with their spouse  Occupation: none  Prior Level of Function: Independent   Current Level of Function: mod I    Pain:  Current 7/10, worst 10/10, best 3/10   Location: right knee     Description: Aching, Throbbing and Sharp  Aggravating Factors: Sitting, Standing, Laying, Bending, Extension and Flexing  Easing Factors: relaxation    Patients goals: decrease pain     Medical History:   Past Medical History:   Diagnosis Date    Anxiety     Asthma     Breast cancer 2017    Cancer     right breast, metastatic-lymph nodes, bone, and thyroid     COPD (chronic obstructive pulmonary disease)     Depression     History of psychiatric hospitalization     2000; suicidal ideation    Hx of psychiatric care     Hypothyroidism     Miscarriage     five miscarriages    Obesity     Psychiatric problem     Thyroid cancer 2017       Surgical History:   Josey Flores  has a past surgical history that includes Cholecystectomy; Tonsillectomy; Adenoidectomy; Appendectomy; Abscess drainage; Skin graft (06/16/2017); Thyroidectomy (Bilateral); Mass excision; Breast biopsy (Right); Fluoroscopy (N/A, 1/28/2021); Mediport insertion, single (Right, 02/2021); and Endobronchial ultrasound (Bilateral, 2/18/2021).    Medications:   Josey has a current medication list which includes the following prescription(s): albuterol, albuterol, alprazolam, bupropion, calcium carbonate, cyanocobalamin, dexamethasone, diphenoxylate-atropine 2.5-0.025 mg, vitamin d2, hydrocodone-acetaminophen, hydroxyzine hcl, ipratropium, letrozole, levothyroxine, magnesium oxide, morphine, multivitamin, olanzapine, olanzapine, ondansetron, palbociclib, potassium chloride sa, and prochlorperazine.    Allergies:   Review of patient's allergies indicates:   Allergen Reactions    Gabapentin Swelling     Note: unilateral joint edema, unclear if due to gabapentin    Nsaids (non-steroidal anti-inflammatory drug) Other (See Comments)     Instructed not to take NSAID drugs with chemo pill.    Clindamycin Rash    Vancomycin analogues Itching          OBJECTIVE       CMS Impairment/Limitation/Restriction for FOTO Knee Survey    Therapist  reviewed FOTO scores for Josey Flores on 2/9/2022.   FOTO documents entered into EPIC - see Media section.    Limitation Score: 57%          Gait:  unsteady gait, decreased step length, increased base of support, decreased arm swing, antalgic gait, shuffle gait and flexed posturing     Balance: R LE = 0, L LE = 0, eyes open        30 second sit-to-stand test (without U/E support): (2 w/ UE support)        Edema noted in both ankles and feet secondary to chemo drug currently prescribed     Strength:       L/E MMT Right Left Pain/Dysfunction with Movement   Hip Flexion 3/5 4/5    Hip Extension 3/5 3+/5    Gluteus medius 3/5 4-/5    Gluteus filomena 3/5 4/5    Hip IR 3+/5 4/5    Hip ER 3+/5 4+/5    Knee Flexion 3/5 4/5    Knee Extension 3/5 4/5    Ankle DF 3+/5 4+/5    Ankle PF 3/5 4/5          Range of Motion:     Knee Right Left Pain/Dysfunction with Movement   AROM/PROM      flexion  109/113  115/120    extension  8/4  0/0    extension (sitting)  10/5  0/0          Joint Mobility: pain with patella mobs, severe pain reported with medial and superior   Muscle Length: Pt presents with limited MLT in hamstrings, gastroc, hip flexors.    Palpation: tenderness reported right bilateral joint line, patella tenson      TREATMENT     Total Treatment time (time-based codes) separate from Evaluation: 15 minutes      Josey received the treatments listed below:       THERAPEUTIC EXERCISES to develop strength, endurance, ROM, flexibility, posture and core stabilization for 15 minutes including SLR, BRIDGE, hip clam, abd, prone tke      Plan for Next Visit: savita, sunny, fela       PATIENT EDUCATION AND HOME EXERCISES     Education provided:   - YES    Written Home Exercises Provided: yes. Exercises were reviewed and Josey was able to demonstrate them prior to the end of the session.  Josey demonstrated good  understanding of the education provided. See EMR under Patient Instructions for exercises provided during  therapy sessions.    ASSESSMENT     Josey is a 51 y.o. female referred to outpatient Physical Therapy with a medical diagnosis of Osteoarthritis of both knees . The patient presents with signs and symptoms consistent with diagnosis along with decreased endurance and impairments which include weakness, impaired endurance, impaired functional mobility, gait instability, impaired balance, decreased lower extremity function, pain, decreased ROM, impaired joint extensibility and impaired muscle length.  These impairments are limiting patient's ability to complete bed mobility, transfers, and home/community ambulation.     Pt prognosis is Good, if patient is consistent and compliant with PT and HEP .   Pt will benefit from skilled outpatient Physical Therapy to address the deficits stated above and in the chart below, provide pt/family education, and to maximize pt's level of independence.     Plan of care discussed with patient: Yes  Pt's spiritual, cultural and educational needs considered and patient is agreeable to the plan of care and goals as stated below:     Anticipated Barriers for therapy: none    Medical Necessity is demonstrated by the following  History  Co-morbidities and personal factors that may impact the plan of care Co-morbidities:   See above listed medical hx    Personal Factors:   no deficits     moderate   Examination  Body Structures and Functions, activity limitations and participation restrictions that may impact the plan of care Body Regions:   lower extremities    Body Systems:    ROM  strength  balance  gait  transfers  joint mobility, muscle tone, muscle length    Participation Restrictions:   See current level of function listed above     Activity limitations:   Learning and applying knowledge  no deficits    General Tasks and Commands  no deficits    Communication  no deficits    Mobility  lifting and carrying objects  walking    Self care  washing oneself (bathing, drying, washing  "hands)  toileting  dressing    Domestic Life  shopping  cooking  doing house work (cleaning house, washing dishes, laundry)    Interactions/Relationships  complex interpersonal interactions    Life Areas  employment    Community and Social Life  community life  recreation and leisure         moderate   Clinical Presentation stable and uncomplicated low   Decision Making/ Complexity Score: low     Goals: Reviewed:2/9/2022    Short Term Goals: In 6 weeks:  1.Patient to be educated on HEP.  2.Patient to demo increased right knee PROM to 0-110, in order to assist with normalizing gait pattern.  3.Patient to increase strength right LE by 1/2 grade, in order to improve endurance and increase ability to ambulate for increased time..  4.Patient to have decreased pain to 4/10 at worst, to improve QOL.  5.Patient complete 4 reps during 30" STS to demo improved transfer efficiency.     Long Term Goals: In 10 weeks  1. Patient to perform daily activities including standing from toilet and bed without increased symptoms.  2. Patient to demonstrate increased right knee AROM to 0-115, in order to assist with normalizing gait pattern..  3. Patient to demonstrate increased right LE strength to 4+/5, in order to improve endurance and increase ability to ambulate for increased time.  4. Patient to have decreased pain to 2/10 at worst, to improve QOL.  5. Patient to improve score on the FOTO to 47% or less, to improve QOL.  6. Patient to increase 30 second sit to stand to 8 repetitions, to decrease fall risk.        PLAN     Plan of care Certification: 2/9/2022 to 5/1/2022.    Outpatient Physical Therapy 2 times weekly for 10 weeks to include the following interventions: Cervical/Lumbar Traction, Electrical Stimulation knees/lumbar, Gait Training, Manual Therapy, Moist Heat/ Ice, Neuromuscular Re-ed, Orthotic Management and Training, Patient Education, Therapeutic Activities, Therapeutic Exercise and Ultrasound.     Gabriella Vaca, " PT      I CERTIFY THE NEED FOR THESE SERVICES FURNISHED UNDER THIS PLAN OF TREATMENT AND WHILE UNDER MY CARE   Physician's comments:     Physician's Signature: ___________________________________________________

## 2022-02-10 NOTE — TELEPHONE ENCOUNTER
Specialty Pharmacy - Refill Coordination    Specialty Medication Orders Linked to Encounter    Flowsheet Row Most Recent Value   Medication #1 letrozole (FEMARA) 2.5 mg Tab (Order#[id number], Rx#[id number])        Patient confirmed she is holding the Ibrance per MD orders and continuing Letrozole. Confirmed via Chart review Dr. Asencio wants patient holding Ibrance and continuing Letrozole. Assigned [facility] is tracking Ibrance plan, proceeding with Letrozole refill.     Refill Questions - Documented Responses    Flowsheet Row Most Recent Value   Patient Availability and HIPAA Verification    Does patient want to proceed with activity? Yes   Relationship to patient of person spoken to? Self   Refill Screening Questions    Changes to allergies? No   Changes to medications? No   New conditions since last clinic visit? No   Unplanned office visit, urgent care, ED, or hospital admission in the last 4 weeks? No   How does patient/caregiver feel medication is working? Fair   Financial problems or insurance changes? No   How many doses of your specialty medications were missed in the last 4 weeks? 0   Would patient like to speak to a pharmacist? No   When does the patient need to receive the medication? 02/11/22   Refill Delivery Questions    How will the patient receive the medication? Delivery Pauly   When does the patient need to receive the medication? 02/11/22   Shipping Address Home   Address in [facility] confirmed and updated if neccessary? Yes   Expected Copay ($) 0   Is the patient able to afford the medication copay? Yes   Payment Method zero copay   Days supply of Refill 30   Supplies needed? No supplies needed   Refill activity completed? Yes   Refill activity plan Refill scheduled   Shipment/Pickup Date: 02/11/22          Current Outpatient Medications   Medication Sig    albuterol (PROVENTIL) 2.5 mg /3 mL (0.083 %) nebulizer solution Take 3 mLs (2.5 mg total) by nebulization every 4 to 6 hours as needed for  Wheezing or Shortness of Breath. (Patient not taking: Reported on 2/4/2022)    albuterol (PROVENTIL/VENTOLIN HFA) 90 mcg/actuation inhaler Inhale 2 puffs into the lungs every 4 (four) hours as needed for Wheezing or Shortness of Breath. (Patient not taking: Reported on 2/4/2022)    ALPRAZolam (XANAX) 1 MG tablet Take 0.5-1 tablets (0.5-1 mg total) by mouth 2 (two) times daily as needed for Anxiety.    buPROPion (WELLBUTRIN SR) 150 MG TBSR 12 hr tablet Take 1 tablet (150 mg total) by mouth 2 (two) times daily. Take 1 tablet (150mg) once daily for 1 week then increase to twice daily    calcium carbonate 400 mg calcium (1,000 mg) Chew Take 2,000 mg by mouth once daily.    cyanocobalamin (VITAMIN B-12) 1000 MCG tablet Take 1 tablet (1,000 mcg total) by mouth once daily.    dexAMETHasone (DECADRON) 4 MG Tab Take 2 tablets (8 mg total) by mouth once daily. Take as directed on days 2, 3, and 4 of your chemotherapy cycle.    diphenoxylate-atropine 2.5-0.025 mg (LOMOTIL) 2.5-0.025 mg per tablet Take 1 tablet by mouth 4 (four) times daily as needed for Diarrhea.    ergocalciferol (VITAMIN D2) 50,000 unit Cap Take 5,000 Units by mouth once daily at 6am.     HYDROcodone-acetaminophen (NORCO)  mg per tablet Take 1 tablet by mouth every 4 (four) hours as needed for Pain. No more than 4 doses/ day    hydrOXYzine HCL (ATARAX) 25 MG tablet Take 1 tablet (25 mg total) by mouth 3 (three) times daily as needed for Itching.    ipratropium (ATROVENT) 42 mcg (0.06 %) nasal spray 2 sprays by Nasal route 4 (four) times daily. As needed for rhinitis. Take in evening before bed and in the morning    letrozole (FEMARA) 2.5 mg Tab Take 1 tablet (2.5 mg) by mouth once daily. (Patient not taking: Reported on 2/4/2022.)    levothyroxine (EUTHYROX) 175 MCG tablet Take 1 tablet (175 mcg total) by mouth once daily.    magnesium oxide (MAGOX) 400 mg (241.3 mg magnesium) tablet Take 1 tablet (400 mg total) by mouth once daily.     morphine (MS CONTIN) 15 MG 12 hr tablet Take 1 tablet (15 mg total) by mouth every 12 (twelve) hours.    multivitamin (THERAGRAN) per tablet Take 1 tablet by mouth once daily.    OLANZapine (ZYPREXA) 5 MG tablet Take 1 tablet (5 mg total) by mouth nightly. Take on evenings of chemotherapy days 1, 2, 3 and 4    OLANZapine (ZYPREXA) 5 MG tablet Take 1 tablet (5 mg total) by mouth every evening. Take as directed on days 1-4 of your chemotherapy cycle. May increase to 10 mg if breakthrough nausea occurs.    ondansetron (ZOFRAN-ODT) 8 MG TbDL Take 1 tablet (8 mg total) by mouth every 8 (eight) hours as needed (nausea/vomiting). (Patient not taking: Reported on 2/4/2022)    palbociclib (IBRANCE) 125 mg Cap Take one capsule (125 mg) by mouth once daily on days 1-21 of each 28-day cycle. (Patient not taking: Reported on 2/4/2022.)    potassium chloride SA (K-DUR,KLOR-CON) 20 MEQ tablet Take 1 tablet (20 mEq total) by mouth once daily. Take daily for 3 days.    prochlorperazine (COMPAZINE) 5 MG tablet Take 1 tablet (5 mg total) by mouth every 6 (six) hours as needed for Nausea.   Last reviewed on 2/4/2022 10:28 AM by Yasmin Street LPN    Review of patient's allergies indicates:   Allergen Reactions    Gabapentin Swelling     Note: unilateral joint edema, unclear if due to gabapentin    Nsaids (non-steroidal anti-inflammatory drug) Other (See Comments)     Instructed not to take NSAID drugs with chemo pill.    Clindamycin Rash    Vancomycin analogues Itching    Last reviewed on  2/4/2022 10:26 AM by Yasmin Street      Tasks added this encounter   3/6/2022 - Refill Call (Auto Added)   Tasks due within next 3 months   No tasks due.     Montrell Carr, PharmD  [facility]  [address] [place] [id number]  Phone: [phone]  Fax: [phone]

## 2022-02-16 ENCOUNTER — TELEPHONE (OUTPATIENT)
Dept: HEMATOLOGY/ONCOLOGY | Facility: CLINIC | Age: 52
End: 2022-02-16
Payer: MEDICAID

## 2022-02-16 ENCOUNTER — PATIENT MESSAGE (OUTPATIENT)
Dept: HEMATOLOGY/ONCOLOGY | Facility: CLINIC | Age: 52
End: 2022-02-16
Payer: MEDICAID

## 2022-02-16 ENCOUNTER — OFFICE VISIT (OUTPATIENT)
Dept: OTOLARYNGOLOGY | Facility: CLINIC | Age: 52
End: 2022-02-16
Payer: MEDICAID

## 2022-02-16 VITALS — BODY MASS INDEX: 32.49 KG/M2 | WEIGHT: 207.44 LBS | TEMPERATURE: 97 F

## 2022-02-16 DIAGNOSIS — H61.23 BILATERAL IMPACTED CERUMEN: ICD-10-CM

## 2022-02-16 DIAGNOSIS — H60.42 CHOLESTEATOMA OF EXTERNAL AUDITORY CANAL, LEFT: Primary | ICD-10-CM

## 2022-02-16 PROCEDURE — 69210 REMOVE IMPACTED EAR WAX UNI: CPT | Mod: S$PBB,,, | Performed by: PHYSICIAN ASSISTANT

## 2022-02-16 PROCEDURE — 69210 REMOVE IMPACTED EAR WAX UNI: CPT | Mod: PBBFAC | Performed by: PHYSICIAN ASSISTANT

## 2022-02-16 PROCEDURE — 99999 PR PBB SHADOW E&M-EST. PATIENT-LVL IV: CPT | Mod: PBBFAC,,, | Performed by: PHYSICIAN ASSISTANT

## 2022-02-16 PROCEDURE — 3008F PR BODY MASS INDEX (BMI) DOCUMENTED: ICD-10-PCS | Mod: CPTII,,, | Performed by: PHYSICIAN ASSISTANT

## 2022-02-16 PROCEDURE — 99212 OFFICE O/P EST SF 10 MIN: CPT | Mod: 25,S$PBB,, | Performed by: PHYSICIAN ASSISTANT

## 2022-02-16 PROCEDURE — 1159F MED LIST DOCD IN RCRD: CPT | Mod: CPTII,,, | Performed by: PHYSICIAN ASSISTANT

## 2022-02-16 PROCEDURE — 3008F BODY MASS INDEX DOCD: CPT | Mod: CPTII,,, | Performed by: PHYSICIAN ASSISTANT

## 2022-02-16 PROCEDURE — 99212 PR OFFICE/OUTPT VISIT, EST, LEVL II, 10-19 MIN: ICD-10-PCS | Mod: 25,S$PBB,, | Performed by: PHYSICIAN ASSISTANT

## 2022-02-16 PROCEDURE — 69210 PR REMOVAL IMPACTED CERUMEN REQUIRING INSTRUMENTATION, UNILATERAL: ICD-10-PCS | Mod: S$PBB,,, | Performed by: PHYSICIAN ASSISTANT

## 2022-02-16 PROCEDURE — 1159F PR MEDICATION LIST DOCUMENTED IN MEDICAL RECORD: ICD-10-PCS | Mod: CPTII,,, | Performed by: PHYSICIAN ASSISTANT

## 2022-02-16 PROCEDURE — 99999 PR PBB SHADOW E&M-EST. PATIENT-LVL IV: ICD-10-PCS | Mod: PBBFAC,,, | Performed by: PHYSICIAN ASSISTANT

## 2022-02-16 PROCEDURE — 99214 OFFICE O/P EST MOD 30 MIN: CPT | Mod: PBBFAC | Performed by: PHYSICIAN ASSISTANT

## 2022-02-16 NOTE — PROGRESS NOTES
Subjective:   Cerumen impactions     Patient ID: Josey Flores is a 51 y.o. female.    Chief Complaint:  Excessive ear wax     Josey Flores is a 51 y.o. female here to see me today for bilateral routine ear cleaning.  She has known left EAC cholesteatoma.  She has not had any recent ear drainage.  She has occasional left ear ache into her jaw at times.  She also notes left ear itchiness at times.    She was last here with Dr. Wu on 12/16/2021. She has only needed to use ear powder once since that time.  Denies any recent changes in her hearing.  She has occasional tinnitus AU.        Hx of BRCA and chemo in the past, no XRT.  She restarted chemotherapy within the past 2 weeks.           HPI  Review of Systems   HENT: Positive for hearing loss and ear pain. Negative for ear discharge and tinnitus.        Objective:     Physical Exam   HENT:   Right Ear: External ear and ear canal normal. Decreased hearing is noted.   Left Ear: External ear and ear canal normal. Decreased hearing is noted.   Otoscopy of external auditory canals and tympanic membranes was significant for copious cerumen with underlying purulent material on the left.  She has erosion of her inferior left external auditory canal (canal cholesteatoma) with inflammation present.  Cerumen in right EAC as well with maceration of the inferior EAC.      Procedure Note    CHIEF COMPLAINT:  Cerumen Impaction    Description:  The patient was seated in an exam chair.  An ear speculum was placed in the right EAC and was examined under the microscope.  Suction and/or loop curettes were used to remove a large cerumen impaction.  The tympanic membrane was visualized and was normal in appearance but the inferior EAC appears inflamed and macerated.  The procedure was repeated on the left side in a similar fashion.  The TM was intact and normal on this side.  She has erosion of her inferior left external auditory canal (canal cholesteatoma) with inflammation  present.   The patient tolerated the procedure well.           Assessment:     1. Cholesteatoma of external auditory canal, left    2. Bilateral impacted cerumen        Plan:        She has left EAC cholesteatoma.  She has a history breast cancer and tobacco use.  Previously saw Dr. Wu and discussed options including elective canaloplasty vs tympmastoid.  She prefers to continue with observation for now.  She has stopped using q-tips.  Discussed that both inferior EACs today appear macerated once cerumen removed; she continues to have left EAC bony erosion.  I would recommend she use her ototopical powder in both ears when she gets home.  Instructed her to call with any ear pain or drainage, otherwise will plan to recheck in 2-3 months for repeat ear cleaning.  She has just resumed chemotherapy and reports if it does not help, she will then proceed with XRT next.

## 2022-02-16 NOTE — TELEPHONE ENCOUNTER
SW called pt to f/u on previous encounter. Pt stated that she is feeling down because of her hair falling out after it started back growing. SW offered pt a wig voucher through [employer]. Pt verbalized agreement. SW discussed [facility] with her and pt verbalized agreement.     F/u by friday

## 2022-02-18 ENCOUNTER — OFFICE VISIT (OUTPATIENT)
Dept: HEMATOLOGY/ONCOLOGY | Facility: CLINIC | Age: 52
End: 2022-02-18
Payer: MEDICAID

## 2022-02-18 ENCOUNTER — INFUSION (OUTPATIENT)
Dept: INFUSION THERAPY | Facility: HOSPITAL | Age: 52
End: 2022-02-18
Attending: INTERNAL MEDICINE
Payer: MEDICAID

## 2022-02-18 VITALS
TEMPERATURE: 98 F | HEART RATE: 80 BPM | HEIGHT: 67 IN | RESPIRATION RATE: 16 BRPM | SYSTOLIC BLOOD PRESSURE: 123 MMHG | OXYGEN SATURATION: 100 % | WEIGHT: 208 LBS | DIASTOLIC BLOOD PRESSURE: 61 MMHG | BODY MASS INDEX: 32.65 KG/M2

## 2022-02-18 DIAGNOSIS — C53.9 RECURRENT CERVICAL CANCER: ICD-10-CM

## 2022-02-18 DIAGNOSIS — C53.0 MALIGNANT NEOPLASM OF ENDOCERVIX: Primary | ICD-10-CM

## 2022-02-18 DIAGNOSIS — R94.6 THYROID FUNCTION STUDY ABNORMALITY: ICD-10-CM

## 2022-02-18 DIAGNOSIS — C73 PAPILLARY THYROID CARCINOMA: ICD-10-CM

## 2022-02-18 PROCEDURE — 25000003 PHARM REV CODE 250: Performed by: INTERNAL MEDICINE

## 2022-02-18 PROCEDURE — 96417 CHEMO IV INFUS EACH ADDL SEQ: CPT

## 2022-02-18 PROCEDURE — 1160F PR REVIEW ALL MEDS BY PRESCRIBER/CLIN PHARMACIST DOCUMENTED: ICD-10-PCS | Mod: CPTII,95,, | Performed by: INTERNAL MEDICINE

## 2022-02-18 PROCEDURE — 1159F MED LIST DOCD IN RCRD: CPT | Mod: CPTII,95,, | Performed by: INTERNAL MEDICINE

## 2022-02-18 PROCEDURE — 96367 TX/PROPH/DG ADDL SEQ IV INF: CPT

## 2022-02-18 PROCEDURE — 96413 CHEMO IV INFUSION 1 HR: CPT

## 2022-02-18 PROCEDURE — 63600175 PHARM REV CODE 636 W HCPCS: Performed by: INTERNAL MEDICINE

## 2022-02-18 PROCEDURE — 99215 PR OFFICE/OUTPT VISIT, EST, LEVL V, 40-54 MIN: ICD-10-PCS | Mod: 95,,, | Performed by: INTERNAL MEDICINE

## 2022-02-18 PROCEDURE — 1159F PR MEDICATION LIST DOCUMENTED IN MEDICAL RECORD: ICD-10-PCS | Mod: CPTII,95,, | Performed by: INTERNAL MEDICINE

## 2022-02-18 PROCEDURE — 96415 CHEMO IV INFUSION ADDL HR: CPT

## 2022-02-18 PROCEDURE — 96365 THER/PROPH/DIAG IV INF INIT: CPT

## 2022-02-18 PROCEDURE — 1160F RVW MEDS BY RX/DR IN RCRD: CPT | Mod: CPTII,95,, | Performed by: INTERNAL MEDICINE

## 2022-02-18 PROCEDURE — 96375 TX/PRO/DX INJ NEW DRUG ADDON: CPT

## 2022-02-18 PROCEDURE — 99215 OFFICE O/P EST HI 40 MIN: CPT | Mod: 95,,, | Performed by: INTERNAL MEDICINE

## 2022-02-18 RX ORDER — LORAZEPAM 2 MG/ML
0.5 INJECTION INTRAMUSCULAR
Status: CANCELLED
Start: 2022-02-18 | End: 2022-02-18

## 2022-02-18 RX ORDER — EPINEPHRINE 0.3 MG/.3ML
0.3 INJECTION SUBCUTANEOUS ONCE AS NEEDED
Status: CANCELLED | OUTPATIENT
Start: 2022-02-18

## 2022-02-18 RX ORDER — LEVOTHYROXINE SODIUM 200 UG/1
175 TABLET ORAL DAILY
Qty: 30 TABLET | Refills: 2 | Status: SHIPPED | OUTPATIENT
Start: 2022-02-18 | End: 2022-06-06 | Stop reason: SDUPTHER

## 2022-02-18 RX ORDER — FAMOTIDINE 10 MG/ML
20 INJECTION INTRAVENOUS
Status: COMPLETED | OUTPATIENT
Start: 2022-02-18 | End: 2022-02-18

## 2022-02-18 RX ORDER — SODIUM CHLORIDE 0.9 % (FLUSH) 0.9 %
10 SYRINGE (ML) INJECTION
Status: CANCELLED | OUTPATIENT
Start: 2022-02-18

## 2022-02-18 RX ORDER — FAMOTIDINE 10 MG/ML
20 INJECTION INTRAVENOUS
Status: CANCELLED | OUTPATIENT
Start: 2022-02-18

## 2022-02-18 RX ORDER — DIPHENHYDRAMINE HYDROCHLORIDE 50 MG/ML
50 INJECTION INTRAMUSCULAR; INTRAVENOUS ONCE AS NEEDED
Status: CANCELLED | OUTPATIENT
Start: 2022-02-18

## 2022-02-18 RX ORDER — HEPARIN 100 UNIT/ML
500 SYRINGE INTRAVENOUS
Status: DISCONTINUED | OUTPATIENT
Start: 2022-02-18 | End: 2022-02-18 | Stop reason: HOSPADM

## 2022-02-18 RX ORDER — HEPARIN 100 UNIT/ML
500 SYRINGE INTRAVENOUS
Status: CANCELLED | OUTPATIENT
Start: 2022-02-18

## 2022-02-18 RX ADMIN — CARBOPLATIN 495 MG: 10 INJECTION, SOLUTION INTRAVENOUS at 03:02

## 2022-02-18 RX ADMIN — SODIUM CHLORIDE: 0.9 INJECTION, SOLUTION INTRAVENOUS at 10:02

## 2022-02-18 RX ADMIN — FAMOTIDINE 20 MG: 10 INJECTION, SOLUTION INTRAVENOUS at 11:02

## 2022-02-18 RX ADMIN — Medication 500 UNITS: at 03:02

## 2022-02-18 RX ADMIN — SODIUM CHLORIDE 200 MG: 9 INJECTION, SOLUTION INTRAVENOUS at 10:02

## 2022-02-18 RX ADMIN — DIPHENHYDRAMINE HYDROCHLORIDE 50 MG: 50 INJECTION, SOLUTION INTRAMUSCULAR; INTRAVENOUS at 10:02

## 2022-02-18 RX ADMIN — APREPITANT 130 MG: 130 INJECTION, EMULSION INTRAVENOUS at 11:02

## 2022-02-18 RX ADMIN — PALONOSETRON HYDROCHLORIDE: 0.25 INJECTION, SOLUTION INTRAVENOUS at 11:02

## 2022-02-18 RX ADMIN — PACLITAXEL 366 MG: 6 INJECTION, SOLUTION INTRAVENOUS at 11:02

## 2022-02-18 NOTE — Clinical Note
Rita, I was hoping you can assist with this patient to be sure things do not fall through the cracks as I approach my maternity leave.  I have clearly outlined things in my notes however I worry that covering physician will not read and if you can be micro managing a bit would really appreciate!  Essentially our plan is for 3 cycles chemotherapy with immunotherapy re-evaluation with PET scan to determine if we are to proceed with interval brachytherapy.  She has seen Dr. SAURABH Vázquez at [facility] already and evaluation.  I have let her know plan for scan around 03/25/2022.  Essentially if she has no signs of distant progression and resistant disease only and cervix we think it will be reasonable to proceed with interval brachytherapy before resuming additional 3 cycles of chemo immunotherapy.  I would like MD to meet with her after this can and if you can also help touch base with Dr. Vázquez follow-up at that time to determine if we are taking a break before cycle 4.

## 2022-02-18 NOTE — PLAN OF CARE
Pt tolerated all well.  To return in 3 weeks.  Appts. Given.  Will also increase synthroid per Dr. WILEY.  Has RX @ pharmacy.  Notified pt who verbalized understanding.   Also gave wig voucher from [employer].

## 2022-02-18 NOTE — PROGRESS NOTES
The patient location is:  [facility]  The chief complaint leading to consultation is:  Follow-up cycle 2 planned    Visit type: audiovisual    Face to Face time with patient:  10 minutes  45 minutes of total time spent on the encounter, which includes face to face time and non-face to face time preparing to see the patient (eg, review of tests), Obtaining and/or reviewing separately obtained history, Documenting clinical information in the electronic or other health record, Independently interpreting results (not separately reported) and communicating results to the patient/family/caregiver, or Care coordination (not separately reported).         Each patient to whom he or she provides medical services by telemedicine is:  (1) informed of the relationship between the physician and patient and the respective role of any other health care provider with respect to management of the patient; and (2) notified that he or she may decline to receive medical services by telemedicine and may withdraw from such care at any time.    Notes:     Subjective:      DATE OF VISIT: 2/18/2022   ?   Patient ID:?Josey Flores is a 51 y.o. female.?? MR#: [id number]   ?   PRIMARY PROVIDER: Dr. Asencio  ?   CHIEF COMPLAINT:  Follow-up  ?   ONCOLOGIC DIAGNOSIS:      Stage IV cervical squamous cell carcinoma    stage IV, T2 N1b M1, invasive breast carcinoma, ER/LA+, HER2-    Papillary thyroid carcinoma status post total thyroidectomy on 08/31/2017, mpT1b N0     Cervical HSIL MIREILLE 3 s/p LEEP, 2017  ?   CURRENT TREATMENT:    Letrozole  Carboplatin, paclitaxel and pembrolizumab, C1D1 1/28/22    PAST TREATMENT:    Palliative chemotherapy: cisplatin, paclitaxel and bevacizumab; 02/22/2021 cycle 1 day 1 -> bevacizumab maintenance after 6 cycles  Palbociclib and letrozole (palbociclib held with ongoing chemotherapy due to concern for cytopenias)    INTERVAL EVENTS    Tolerated her 1st cycle of chemo immunotherapy relatively well.  She does have  mild nausea generally controlled with antiemetics.  She endorses good PO intake.  Declined IV fluids during follow-up.  ROS  A comprehensive 14-point review of systems was reviewed with patient and was negative other than as specified above.   ?     Objective:      Physical Exam        There were no vitals filed for this visit.   Limited due to virtual visit  ECOG:?0   General appearance: Generally well appearing, in no acute distress.   Head, eyes, ears, nose, and throat: moist mucous membranes.   Respiratory:  Normal work of breathing   Psychiatric:  Normal mood and affect.      Laboratory:  ?   Lab Visit on 02/18/2022   Component Date Value Ref Range Status    WBC 02/18/2022 8.87  3.90 - 12.70 K/uL Final    RBC 02/18/2022 3.52* 4.00 - 5.40 M/uL Final    Hemoglobin 02/18/2022 12.0  12.0 - 16.0 g/dL Final    Hematocrit 02/18/2022 36.7* 37.0 - 48.5 % Final    MCV 02/18/2022 104* 82 - 98 fL Final    MCH 02/18/2022 34.1* 27.0 - 31.0 pg Final    MCHC 02/18/2022 32.7  32.0 - 36.0 g/dL Final    RDW 02/18/2022 16.6* 11.5 - 14.5 % Final    Platelets 02/18/2022 171  150 - 450 K/uL Final    MPV 02/18/2022 10.9  9.2 - 12.9 fL Final    Immature Granulocytes 02/18/2022 4.1* 0.0 - 0.5 % Final    Gran # (ANC) 02/18/2022 4.8  1.8 - 7.7 K/uL Final    Immature Grans (Abs) 02/18/2022 0.36* 0.00 - 0.04 K/uL Final    Lymph # 02/18/2022 2.5  1.0 - 4.8 K/uL Final    Mono # 02/18/2022 0.9  0.3 - 1.0 K/uL Final    Eos # 02/18/2022 0.2  0.0 - 0.5 K/uL Final    Baso # 02/18/2022 0.04  0.00 - 0.20 K/uL Final    nRBC 02/18/2022 0  0 /100 WBC Final    Gran % 02/18/2022 54.3  38.0 - 73.0 % Final    Lymph % 02/18/2022 28.6  18.0 - 48.0 % Final    Mono % 02/18/2022 10.5  4.0 - 15.0 % Final    Eosinophil % 02/18/2022 2.0  0.0 - 8.0 % Final    Basophil % 02/18/2022 0.5  0.0 - 1.9 % Final    Differential Method 02/18/2022 Automated   Final    Sodium 02/18/2022 141  136 - 145 mmol/L Final    Potassium 02/18/2022 4.6  3.5 -  5.1 mmol/L Final    Chloride 02/18/2022 104  95 - 110 mmol/L Final    CO2 02/18/2022 26  23 - 29 mmol/L Final    Glucose 02/18/2022 100  70 - 110 mg/dL Final    BUN 02/18/2022 14  6 - 20 mg/dL Final    Creatinine 02/18/2022 1.3  0.5 - 1.4 mg/dL Final    Calcium 02/18/2022 9.0  8.7 - 10.5 mg/dL Final    Total Protein 02/18/2022 7.0  6.0 - 8.4 g/dL Final    Albumin 02/18/2022 3.9  3.5 - 5.2 g/dL Final    Total Bilirubin 02/18/2022 0.3  0.1 - 1.0 mg/dL Final    Alkaline Phosphatase 02/18/2022 71  55 - 135 U/L Final    AST 02/18/2022 14  10 - 40 U/L Final    ALT 02/18/2022 13  10 - 44 U/L Final    Anion Gap 02/18/2022 11  8 - 16 mmol/L Final    eGFR if  02/18/2022 54* >60 mL/min/1.73 m^2 Final    eGFR if non  02/18/2022 47* >60 mL/min/1.73 m^2 Final    TSH 02/18/2022 58.336* 0.400 - 4.000 uIU/mL Final    Free T4 02/18/2022 0.68* 0.71 - 1.51 ng/dL Final      Lab Results   Component Value Date    WBC 8.87 02/18/2022    HGB 12.0 02/18/2022    HCT 36.7 (L) 02/18/2022     (H) 02/18/2022     02/18/2022         Chemistry        Component Value Date/Time     02/18/2022 0753    K 4.6 02/18/2022 0753     02/18/2022 0753    CO2 26 02/18/2022 0753    BUN 14 02/18/2022 0753    CREATININE 1.3 02/18/2022 0753     02/18/2022 0753        Component Value Date/Time    CALCIUM 9.0 02/18/2022 0753    ALKPHOS 71 02/18/2022 0753    AST 14 02/18/2022 0753    ALT 13 02/18/2022 0753    BILITOT 0.3 02/18/2022 0753    ESTGFRAFRICA 54 (A) 02/18/2022 0753    EGFRNONAA 47 (A) 02/18/2022 0753          ? IMAGING   Results for orders placed or performed during the hospital encounter of 01/11/22 (from the past 2160 hour(s))   NM PET CT Routine Skull to Mid Thigh    Impression    1. Significant uptake seen in the region of the cervix, consistent with recurrent/residual disease.  2. Remaining findings as discussed above and stable when compared to the prior  study.      Electronically signed by: Bello Agee DO  Date:    01/11/2022  Time:    16:40     *Note: Due to a large number of results and/or encounters for the requested time period, some results have not been displayed. A complete set of results can be found in Results Review.     No results found. However, due to the size of the patient record, not all encounters were searched. Please check Results Review for a complete set of results.    PATHOLOGY  2/2021  Station 7 lymph node, fine needle aspiration (8 smears, 1 cell block):       -  Positive for malignant cells, morphologically consistent with   metastatic squamous cell carcinoma     Assessment/Plan:       1. Malignant neoplasm of endocervix    2. Recurrent cervical cancer          Plan:     # metastatic squamous cell carcinoma of the cervix SCC cervix:  history of HSIL Status post LEEP 2017. In December 2020 follow-up with gynecology with new spotty vaginal bleeding/discharge with biopsy 12/7/20 showing squamous cell carcinoma consistent with cervical primary.  MRI pelvis performed showing locally advanced disease with 4.9 cm cervical mass with right parametrial involvement and enlarged right external iliac lymph node 1.5 x 1.2 cm. PET-CT showing avidity of cervical mass with extension to lower uterine segment, right vaginal focus of avidity and lymphadenopathy including right internal external iliac, periaortic, pericaval.  There is the new hypermetabolic lymphadenopathy in right subcarinal 2.3 cm SUV 5.8. Small 9 mm right paratracheal S base slightly increased no FDG avid SUV 2.4.  01/29/2021 multidisciplinary tumor board discussion of her case with review of imaging; concern for new avid lymphadenopathy particularly in right subcarinal may be most consistent with metastatic cervical squamous cell carcinoma; tumor board recommendation for biopsy to confirm for prognostic information as well as given other concomitant malignancy to exclude alternative  histology, although felt less likely metastatic breast given this is been well controlled and primarily bony disease involvement.  We discussed recommendation for systemic chemotherapy given advanced cervical squamous cell carcinoma with cisplatin, paclitaxel and bevacizumab with discontinuation of palbociclib but can continue aromatase inhibitor; taxane may also be active for her concomitant metastatic breast cancer.   - EBUS with biopsy 2/18/21, station 7 consistent with metastatic squamous cell carcinoma of cervical origin.  - STRATA requested on cervical SCC, ERBB3 <5% felt to be subclonal per report, PIK3CA amplification, KF7288, KDM6A, FRANK, TMB low, PDL1 high may indicated sensitivity to check point inhibitor in future regimen.  - audiology exam 2/18/21, recommended that she let us know if any perceived change in her hearing throughout treatment and recommend repeat testing throughout to ensure no ototoxicity.  - [facility] germline genetic testing returned negative for mutation, provided to patient.  - cycle 1 day 1 cisplatin paclitaxel and bevacizumab on 02/22/2021  Repeat scans 04/26/2021 showed excellent improvement in thoracic and pelvic lymphadenopathy and primary cervical mass.  She is asymptomatic from this no further bleeding.  Recommended continuation of her current regimen which she is tolerating well with supportive care, IV fluids 3 times weekly per patient preference.  - restaging after cycle 6 she has continued improvement in metastatic cervical squamous cell carcinoma; avidity in left vulvar area diminished associated with known infection.  She has required substantial supportive care on chemotherapy and recommend given sustained improvement on scans continuing and absence of chemotherapy with bevacizumab alone and continue close surveillance.  She would like to continue IV fluids weekly as needed.    We reviewed in detail restaging PET-CT January 2022 notable for uptake in cervical region  concerning for oligo progression/recurrence of her disease.  Previously biopsy proven metastatic cervical cancer in lungs without evidence of recurrent mass/lymphadenopathy/avidity in this region or otherwise.    Given prior PDL1 high disease CPS >1 we decided to proceed with chemoimmunotherapy per Keynote 826, carboplatin, paclitaxel and pembrolizumab.  She has had consultation with Dr. everett at [facility].  We discussed plan to initiate chemo immunotherapy for 3 cycles, restaging after this (tentative see PET scan 03/22/2022) peer; if no evidence of distant disease and persistent local cervical disease (oligo recurrent) it may be reasonable to proceed with interval brachytherapy before proceeding on cycles 4 through 6.      Tolerated cycle of 1 reasonably well no signs or symptoms of dehydration, nausea generally well controlled encouraged continued antiemetic use p.r.n..  Labs reviewed okay to proceed with cycle 2, 02/18/2022.    Will have her return in 3 weeks or earlier patient will get in touch as needed if supportive care necessary.      Tentative plan for restaging PET after cycle 3, discussed with infusion nursing to please schedule.    Will need covering MD to review scan and get her back to Radiation Oncology Dr. Vázquez as appropriate before cycle 4.        # Hypothyroidism: Rising TSH and low free T4.  Recommend uptitration levothyroxine.    # neuropathy:  Mild, will monitor with re-initiation of chemotherapy per above.    # metastatic breast cancer ER positive HER2 negative:  Had been on systemic therapy with palbociclib and letrozole. While on systemic chemo due to c/f cytopenias/toxicity palbociclib is on hold. We have discussed this extensively at our visit. She is to continue on hormone lowering tx letrozole.       # bony metastatic disease:  on zoledronic acid dosed every 12 weeks. - Last zometa 1/18/22; next planned 4/12/22  DEXA 8/2021 without bone loss    Recent PET scan January 2022 without evidence of new bony metastatic disease.  She feels most comfortable with maintenance q.3 months which is reasonable.  Resolution of ENT/left ear infection.restarted zometa support.  Continue calcium vitamin-D supplementation.    # history of papillary thyroid carcinoma status post total thyroidectomy on 08/31/2017: s/p total thyroidectomy on levothyroxine.     # tobacco abuse:  Current tobacco use.   I have counseled for at least 3 min on the importance of tobacco cessation and discussed pharmacologic and therapy options to  aid in cessation.  She is interested in trial of bupropion prescribed to her local pharmacy.    Follow-Up:   Cycle 2 day 1 carboplatin paclitaxel and pembrolizumab today  Revisit 3 weeks for cycle 3 day 1 planned with CBC CMP TSH free T4 prior  Plan for restaging PET after cycle 3, discussed with infusion nursing to please schedule.  Will need covering MD to review scan after cycle 3 and get her back to Radiation Oncology Dr. Vázquez at [facility] as appropriate before cycle 4, see above.  Note: Last zometa 1/18/22; next planned 4/12/22

## 2022-02-20 ENCOUNTER — PATIENT MESSAGE (OUTPATIENT)
Dept: HEMATOLOGY/ONCOLOGY | Facility: CLINIC | Age: 52
End: 2022-02-20
Payer: MEDICAID

## 2022-02-22 DIAGNOSIS — D84.9 IMMUNOSUPPRESSED STATUS: ICD-10-CM

## 2022-02-22 NOTE — TELEPHONE ENCOUNTER
Mrs. Flores declined refill, stating chemo is on hold. Per VINNY Rivera note on 2/2, palbociclib held with ongoing chemotherapy due to concern for cytopenias. Is getting carboplatin, paclitaxel and pembrolizumab--C1D1 was 1/28/22. Per Dr. Luna note on 12/28, We discussed recommendation for systemic chemotherapy given advanced cervical squamous cell carcinoma with cisplatin, paclitaxel and bevacizumab with discontinuation of palbociclib but can continue aromatase inhibitor; taxane may also be active for her concomitant metastatic breast cancer. This regimen was given for up to 35 cycles in the KEYNOTE-826 trial.   [employer] message sent to VINNY Prieto and Dr. Asencio on 2/9 to inquire into possible number of cycles of carboplatin-paclitaxel-pembrolizumab to pend palbociclib refill.  Per Dr. Montenegro [employer] message on 2/15, I do not know exactly a lot of factors going into this. At least 3 restaging and then maybe 6 total. C1D1 was 1/28. C3D1 is 3/11 and PET CT is on 3/25 @0945. Follow up with Dr. Moseley is on 3/28 @1040. Will pend palbociclib refill to 3/28/2022.

## 2022-02-24 ENCOUNTER — DOCUMENTATION ONLY (OUTPATIENT)
Dept: REHABILITATION | Facility: HOSPITAL | Age: 52
End: 2022-02-24
Payer: MEDICAID

## 2022-02-24 ENCOUNTER — CLINICAL SUPPORT (OUTPATIENT)
Dept: REHABILITATION | Facility: HOSPITAL | Age: 52
End: 2022-02-24
Payer: MEDICAID

## 2022-02-24 DIAGNOSIS — G89.29 CHRONIC PAIN OF BOTH KNEES: Primary | ICD-10-CM

## 2022-02-24 DIAGNOSIS — M25.561 CHRONIC PAIN OF BOTH KNEES: Primary | ICD-10-CM

## 2022-02-24 DIAGNOSIS — M25.562 CHRONIC PAIN OF BOTH KNEES: Primary | ICD-10-CM

## 2022-02-24 PROCEDURE — 97110 THERAPEUTIC EXERCISES: CPT | Mod: CQ

## 2022-02-24 NOTE — PROGRESS NOTES
[facility]   Physical Therapy Assistant Treatment Note     Name: Josey Schmitz [place]  Clinic Number: [id number]    Therapy Diagnosis:   Encounter Diagnosis   Name Primary?    Chronic pain of both knees Yes     Physician: Dakota Reina MD    Visit Date: 2/24/2022      Physician Orders: PT Eval and Treat   Medical Diagnosis from Referral: Osteoarthritis of both knees   Evaluation Date: 2/9/2022  Authorization Period Expiration: 2/4/2023  Plan of Care Expiration: 5/1/2022  Progress Note Due: 10th visit  Visit # / Visits authorized: 2/ 12   FOTO: 1/ 3      Precautions: Standard and Fall    PTA Visit #: 1/5     Time In: 1:15  Time Out: 2:00  Total Billable Time: 45 minutes    SUBJECTIVE     Pt reports: new chemo treatment three days ago and having pain in lower abdominal region.  She was not compliant with home exercise program.  Response to previous treatment: no complaints  Functional change: nothing significant    Pain: 0/10  Location: right knee      OBJECTIVE     Objective Measures updated at progress report unless specified.     Treatment     Josey received the treatments listed below:      Josey received the treatments listed below:       · THERAPEUTIC EXERCISES to develop strength, endurance, ROM, flexibility, posture and core stabilization for 45 minutes including SLR, BRIDGE, hip clam, abd, prone tke  · LTR/ DKTC  · Nustep 5' for mobility        Plan for Next Visit: mcep, sunny, estim           Patient Education and Home Exercises       Education provided:   - YES     Written Home Exercises Provided: yes. Exercises were reviewed and Josey was able to demonstrate them prior to the end of the session.  Josey demonstrated good  understanding of the education provided. See EMR under Patient Instructions for exercises provided during therapy sessions.    ASSESSMENT     Patient presents to therapy without assistive device demonstrating unsteady gait pattern- provided with  "rollator to amb throughout gym. Educated on gait pattern and proper hand placement with rise from chair as she began to pull up on walker. Patient initiated exercises on mat for gentle strengthening with use of strap to improve mobility. She has some discomfort in abdominal region with some exercises- modified repetitions. She will benefit from continued skilled therapy to improve endurance and strength.    Josey Is progressing well towards her goals.   Pt prognosis is Good.     Pt will continue to benefit from skilled outpatient physical therapy to address the deficits listed in the problem list box on initial evaluation, provide pt/family education and to maximize pt's level of independence in the home and community environment.     Pt's spiritual, cultural and educational needs considered and pt agreeable to plan of care and goals.     Anticipated barriers to physical therapy: none      Goals: Reviewed:2/9/2022    Short Term Goals: In 6 weeks:  1.Patient to be educated on HEP.  2.Patient to demo increased right knee PROM to 0-110, in order to assist with normalizing gait pattern.  3.Patient to increase strength right LE by 1/2 grade, in order to improve endurance and increase ability to ambulate for increased time..  4.Patient to have decreased pain to 4/10 at worst, to improve QOL.  5.Patient complete 4 reps during 30" STS to demo improved transfer efficiency.      Long Term Goals: In 10 weeks  1. Patient to perform daily activities including standing from toilet and bed without increased symptoms.  2. Patient to demonstrate increased right knee AROM to 0-115, in order to assist with normalizing gait pattern..  3. Patient to demonstrate increased right LE strength to 4+/5, in order to improve endurance and increase ability to ambulate for increased time.  4. Patient to have decreased pain to 2/10 at worst, to improve QOL.  5. Patient to improve score on the FOTO to 47% or less, to improve QOL.  6. Patient to " increase 30 second sit to stand to 8 repetitions, to decrease fall risk.           PLAN      Plan of care Certification: 2/9/2022 to 5/1/2022.     Outpatient Physical Therapy 2 times weekly for 10 weeks to include the following interventions: Cervical/Lumbar Traction, Electrical Stimulation knees/lumbar, Gait Training, Manual Therapy, Moist Heat/ Ice, Neuromuscular Re-ed, Orthotic Management and Training, Patient Education, Therapeutic Activities, Therapeutic Exercise and Ultrasound.       Shannon Cortez, PTA

## 2022-02-24 NOTE — PROGRESS NOTES
PT/PTA met face to face to discuss pt's treatment plan and progress towards established goals. Pt will be seen by a physical therapist minimally every 6th visit or every 30 days.    Shannon Cortez PTA

## 2022-02-26 ENCOUNTER — HOSPITAL ENCOUNTER (EMERGENCY)
Facility: HOSPITAL | Age: 52
Discharge: HOME | End: 2022-02-26
Attending: EMERGENCY MEDICINE
Payer: MEDICAID

## 2022-02-26 VITALS
TEMPERATURE: 98 F | RESPIRATION RATE: 19 BRPM | BODY MASS INDEX: 32.58 KG/M2 | OXYGEN SATURATION: 98 % | SYSTOLIC BLOOD PRESSURE: 136 MMHG | DIASTOLIC BLOOD PRESSURE: 63 MMHG | HEIGHT: 67 IN | HEART RATE: 80 BPM

## 2022-02-26 DIAGNOSIS — D64.9 ANEMIA, UNSPECIFIED TYPE: ICD-10-CM

## 2022-02-26 DIAGNOSIS — R10.30 LOWER ABDOMINAL PAIN: Primary | ICD-10-CM

## 2022-02-26 LAB
ALBUMIN SERPL BCP-MCNC: 3.6 G/DL (ref 3.5–5.2)
ALP SERPL-CCNC: 65 U/L (ref 55–135)
ALT SERPL W/O P-5'-P-CCNC: 20 U/L (ref 10–44)
ANION GAP SERPL CALC-SCNC: 14 MMOL/L (ref 8–16)
ANISOCYTOSIS BLD QL SMEAR: SLIGHT
AST SERPL-CCNC: 17 U/L (ref 10–40)
BACTERIA #/AREA URNS HPF: NORMAL /HPF
BASOPHILS # BLD AUTO: 0.01 K/UL (ref 0–0.2)
BASOPHILS NFR BLD: 0.3 % (ref 0–1.9)
BILIRUB SERPL-MCNC: 0.3 MG/DL (ref 0.1–1)
BILIRUB UR QL STRIP: NEGATIVE
BUN SERPL-MCNC: 14 MG/DL (ref 6–20)
CALCIUM SERPL-MCNC: 8.7 MG/DL (ref 8.7–10.5)
CHLORIDE SERPL-SCNC: 105 MMOL/L (ref 95–110)
CLARITY UR: CLEAR
CO2 SERPL-SCNC: 19 MMOL/L (ref 23–29)
COLOR UR: YELLOW
CREAT SERPL-MCNC: 1.2 MG/DL (ref 0.5–1.4)
DIFFERENTIAL METHOD: ABNORMAL
EOSINOPHIL # BLD AUTO: 0.2 K/UL (ref 0–0.5)
EOSINOPHIL NFR BLD: 6.2 % (ref 0–8)
ERYTHROCYTE [DISTWIDTH] IN BLOOD BY AUTOMATED COUNT: 15.8 % (ref 11.5–14.5)
EST. GFR  (AFRICAN AMERICAN): 60 ML/MIN/1.73 M^2
EST. GFR  (NON AFRICAN AMERICAN): 52 ML/MIN/1.73 M^2
GLUCOSE SERPL-MCNC: 110 MG/DL (ref 70–110)
GLUCOSE UR QL STRIP: NEGATIVE
HCT VFR BLD AUTO: 29 % (ref 37–48.5)
HGB BLD-MCNC: 9.8 G/DL (ref 12–16)
HGB UR QL STRIP: NEGATIVE
IMM GRANULOCYTES # BLD AUTO: 0.03 K/UL (ref 0–0.04)
IMM GRANULOCYTES NFR BLD AUTO: 0.9 % (ref 0–0.5)
KETONES UR QL STRIP: NEGATIVE
LACTATE SERPL-SCNC: 1.9 MMOL/L (ref 0.5–2.2)
LEUKOCYTE ESTERASE UR QL STRIP: ABNORMAL
LYMPHOCYTES # BLD AUTO: 1.7 K/UL (ref 1–4.8)
LYMPHOCYTES NFR BLD: 48.7 % (ref 18–48)
MCH RBC QN AUTO: 34.4 PG (ref 27–31)
MCHC RBC AUTO-ENTMCNC: 33.8 G/DL (ref 32–36)
MCV RBC AUTO: 102 FL (ref 82–98)
MICROSCOPIC COMMENT: NORMAL
MONOCYTES # BLD AUTO: 0.4 K/UL (ref 0.3–1)
MONOCYTES NFR BLD: 12.6 % (ref 4–15)
NEUTROPHILS # BLD AUTO: 1.1 K/UL (ref 1.8–7.7)
NEUTROPHILS NFR BLD: 31.3 % (ref 38–73)
NITRITE UR QL STRIP: NEGATIVE
NRBC BLD-RTO: 0 /100 WBC
OVALOCYTES BLD QL SMEAR: ABNORMAL
PH UR STRIP: 8 [PH] (ref 5–8)
PLATELET # BLD AUTO: 121 K/UL (ref 150–450)
PLATELET BLD QL SMEAR: ABNORMAL
PMV BLD AUTO: 11.7 FL (ref 9.2–12.9)
POTASSIUM SERPL-SCNC: 4.2 MMOL/L (ref 3.5–5.1)
PROT SERPL-MCNC: 6.9 G/DL (ref 6–8.4)
PROT UR QL STRIP: NEGATIVE
RBC # BLD AUTO: 2.85 M/UL (ref 4–5.4)
SODIUM SERPL-SCNC: 138 MMOL/L (ref 136–145)
SP GR UR STRIP: 1.01 (ref 1–1.03)
URN SPEC COLLECT METH UR: ABNORMAL
UROBILINOGEN UR STRIP-ACNC: 1 EU/DL
WBC # BLD AUTO: 3.41 K/UL (ref 3.9–12.7)
WBC #/AREA URNS HPF: 2 /HPF (ref 0–5)

## 2022-02-26 PROCEDURE — 85025 COMPLETE CBC W/AUTO DIFF WBC: CPT | Performed by: EMERGENCY MEDICINE

## 2022-02-26 PROCEDURE — 99285 EMERGENCY DEPT VISIT HI MDM: CPT | Mod: 25

## 2022-02-26 PROCEDURE — 96374 THER/PROPH/DIAG INJ IV PUSH: CPT | Mod: 59

## 2022-02-26 PROCEDURE — 25000003 PHARM REV CODE 250: Performed by: EMERGENCY MEDICINE

## 2022-02-26 PROCEDURE — 96375 TX/PRO/DX INJ NEW DRUG ADDON: CPT

## 2022-02-26 PROCEDURE — 25500020 PHARM REV CODE 255: Performed by: EMERGENCY MEDICINE

## 2022-02-26 PROCEDURE — 80053 COMPREHEN METABOLIC PANEL: CPT | Performed by: NURSE PRACTITIONER

## 2022-02-26 PROCEDURE — 81000 URINALYSIS NONAUTO W/SCOPE: CPT | Performed by: NURSE PRACTITIONER

## 2022-02-26 PROCEDURE — 83605 ASSAY OF LACTIC ACID: CPT | Performed by: NURSE PRACTITIONER

## 2022-02-26 PROCEDURE — 63600175 PHARM REV CODE 636 W HCPCS: Performed by: EMERGENCY MEDICINE

## 2022-02-26 RX ORDER — HYDROMORPHONE HYDROCHLORIDE 2 MG/ML
1 INJECTION, SOLUTION INTRAMUSCULAR; INTRAVENOUS; SUBCUTANEOUS
Status: COMPLETED | OUTPATIENT
Start: 2022-02-26 | End: 2022-02-26

## 2022-02-26 RX ORDER — ONDANSETRON 2 MG/ML
4 INJECTION INTRAMUSCULAR; INTRAVENOUS
Status: COMPLETED | OUTPATIENT
Start: 2022-02-26 | End: 2022-02-26

## 2022-02-26 RX ADMIN — IOHEXOL 100 ML: 350 INJECTION, SOLUTION INTRAVENOUS at 09:02

## 2022-02-26 RX ADMIN — HYDROMORPHONE HYDROCHLORIDE 1 MG: 2 INJECTION INTRAMUSCULAR; INTRAVENOUS; SUBCUTANEOUS at 08:02

## 2022-02-26 RX ADMIN — ONDANSETRON 4 MG: 2 INJECTION INTRAMUSCULAR; INTRAVENOUS at 08:02

## 2022-02-26 RX ADMIN — SODIUM CHLORIDE 1000 ML: 0.9 INJECTION, SOLUTION INTRAVENOUS at 08:02

## 2022-02-27 NOTE — ED PROVIDER NOTES
SCRIBE #1 NOTE: I, Nicola Khadijah, am scribing for, and in the presence of, Morteza Childs MD. I have scribed the entire note.       History     Chief Complaint   Patient presents with    Abdominal Pain     Pt reports lower abd pain, currently receiving chemo for cervical cancer     Review of patient's allergies indicates:   Allergen Reactions    Gabapentin Swelling     Note: unilateral joint edema, unclear if due to gabapentin    Nsaids (non-steroidal anti-inflammatory drug) Other (See Comments)     Instructed not to take NSAID drugs with chemo pill.    Clindamycin Rash    Vancomycin analogues Itching         History of Present Illness     HPI    2/26/2022, 8:27 PM  History obtained from the patient      History of Present Illness: Josey Flores is a 51 y.o. female patient with a PMHx of cancer in her R breast, lymph nodes, bone, cervix, and thyroid who presents to the Emergency Department for evaluation of lower abdominal pain which onset gradually today. Pt states she is currently receiving chemo for her cervical cancer. Pt describes the pain as a 10 out of 10 in severity. Symptoms are constant and severe in severity. No mitigating or exacerbating factors reported. No associated sxs reported. Patient denies any constipation, n/v/d, weakness, HA, dizziness, and all other sxs at this time. No prior Tx reported. No further complaints or concerns at this time.       Arrival mode: Personal vehicle    PCP: Kishore Persaud MD        Past Medical History:  Past Medical History:   Diagnosis Date    Anxiety     Asthma     Breast cancer 2017    Cancer     right breast, metastatic-lymph nodes, bone, and thyroid     COPD (chronic obstructive pulmonary disease)     Depression     History of psychiatric hospitalization     2000; suicidal ideation    Hx of psychiatric care     Hypothyroidism     Miscarriage     five miscarriages    Obesity     Psychiatric problem     Thyroid cancer 2017       Past  Surgical History:  Past Surgical History:   Procedure Laterality Date    ABSCESS DRAINAGE      bilateral axilla    ADENOIDECTOMY      APPENDECTOMY      BREAST BIOPSY Right     x3    CHOLECYSTECTOMY      ENDOBRONCHIAL ULTRASOUND Bilateral 2021    Procedure: ENDOBRONCHIAL ULTRASOUND (EBUS);  Surgeon: Jaron Ni MD;  Location: [facility];  Service: Pulmonary;  Laterality: Bilateral;    FLUOROSCOPY N/A 2021    Procedure: Mediport placement;  Surgeon: Noah Phelan MD;  Location: [facility] CATH LAB;  Service: General;  Laterality: N/A;    MASS EXCISION      MEDIPORT INSERTION, SINGLE Right 2021    SKIN GRAFT  2017    THYROIDECTOMY Bilateral     TONSILLECTOMY           Family History:  Family History   Problem Relation Age of Onset    Arthritis Mother     Early death Mother         64 at time of death    Heart attack Mother     Heart disease Mother     Heart failure Mother     Hypertension Mother     Coronary artery disease Father     Hearing loss Father     Heart disease Father     Hyperlipidemia Father     Hypertension Father     Mental illness Father     Learning disabilities Son     Cancer Maternal Aunt        Social History:  Social History     Tobacco Use    Smoking status: Current Every Day Smoker     Packs/day: 1.00     Years: 32.00     Pack years: 32.00     Types: Cigarettes     Start date: 1985     Last attempt to quit: 2017     Years since quittin.7    Smokeless tobacco: Never Used    Tobacco comment: 45 pack year history   Substance and Sexual Activity    Alcohol use: No    Drug use: No    Sexual activity: Yes     Partners: Male        Review of Systems     Review of Systems   Constitutional: Negative for chills and fever.   HENT: Negative for congestion and sore throat.    Respiratory: Negative for shortness of breath.    Cardiovascular: Negative for chest pain and leg swelling.   Gastrointestinal: Positive for abdominal pain (Lower region).  "Negative for blood in stool, constipation, diarrhea, nausea and vomiting.   Genitourinary: Negative for dysuria.   Musculoskeletal: Negative for back pain.   Skin: Negative for rash.   Neurological: Negative for dizziness, weakness, numbness and headaches.   Hematological: Does not bruise/bleed easily.   All other systems reviewed and are negative.     Physical Exam     Initial Vitals   BP Pulse Resp Temp SpO2   02/26/22 1920 02/26/22 1920 02/26/22 1920 02/26/22 1921 02/26/22 1920   128/82 98 20 98.1 °F (36.7 °C) 98 %      MAP       --                 Physical Exam  Nursing Notes and Vital Signs Reviewed.  Constitutional: Patient is in mild distress.  Head: Atraumatic. Normocephalic.  Eyes: PERRL. EOM intact. Conjunctivae are not pale. No scleral icterus.  ENT: Mucous membranes are dry. Oropharynx is clear and symmetric.    Neck: Supple. Full ROM. No lymphadenopathy.  Cardiovascular: Regular rate. Regular rhythm. No murmurs, rubs, or gallops. Distal pulses are 2+ and symmetric.  Pulmonary/Chest: No respiratory distress. Clear to auscultation bilaterally. No wheezing or rales.  Abdominal: Soft and non-distended. Lower abdominal tenderness to palpation. No rebound, guarding, or rigidity.  Genitourinary: No CVA tenderness  Musculoskeletal: Moves all extremities. No obvious deformities. No edema. No calf tenderness.  Skin: Warm and dry.  Neurological:  Alert, awake, and appropriate.  Normal speech.  No acute focal neurological deficits are appreciated.  Psychiatric: Normal affect. Good eye contact. Appropriate in content.     ED Course   Procedures  ED Vital Signs:  Vitals:    02/26/22 1920 02/26/22 1921 02/26/22 2050 02/26/22 2052   BP: 128/82  136/63    Pulse: 98  80    Resp: 20  17 19   Temp:  98.1 °F (36.7 °C)     TempSrc:  Oral     SpO2: 98%  98%    Height: 5' 7" (1.702 m)          Abnormal Lab Results:  Labs Reviewed   COMPREHENSIVE METABOLIC PANEL - Abnormal; Notable for the following components:       Result " Value    CO2 19 (*)     eGFR if non  52 (*)     All other components within normal limits   URINALYSIS, REFLEX TO URINE CULTURE - Abnormal; Notable for the following components:    Leukocytes, UA 1+ (*)     All other components within normal limits    Narrative:     Specimen Source->Urine   CBC W/ AUTO DIFFERENTIAL - Abnormal; Notable for the following components:    WBC 3.41 (*)     RBC 2.85 (*)     Hemoglobin 9.8 (*)     Hematocrit 29.0 (*)      (*)     MCH 34.4 (*)     RDW 15.8 (*)     Platelets 121 (*)     Immature Granulocytes 0.9 (*)     Gran # (ANC) 1.1 (*)     Gran % 31.3 (*)     Lymph % 48.7 (*)     Platelet Estimate Decreased (*)     All other components within normal limits   LACTIC ACID, PLASMA   URINALYSIS MICROSCOPIC    Narrative:     Specimen Source->Urine        All Lab Results:  Results for orders placed or performed during the hospital encounter of 02/26/22   Comprehensive metabolic panel   Result Value Ref Range    Sodium 138 136 - 145 mmol/L    Potassium 4.2 3.5 - 5.1 mmol/L    Chloride 105 95 - 110 mmol/L    CO2 19 (L) 23 - 29 mmol/L    Glucose 110 70 - 110 mg/dL    BUN 14 6 - 20 mg/dL    Creatinine 1.2 0.5 - 1.4 mg/dL    Calcium 8.7 8.7 - 10.5 mg/dL    Total Protein 6.9 6.0 - 8.4 g/dL    Albumin 3.6 3.5 - 5.2 g/dL    Total Bilirubin 0.3 0.1 - 1.0 mg/dL    Alkaline Phosphatase 65 55 - 135 U/L    AST 17 10 - 40 U/L    ALT 20 10 - 44 U/L    Anion Gap 14 8 - 16 mmol/L    eGFR if African American 60 >60 mL/min/1.73 m^2    eGFR if non African American 52 (A) >60 mL/min/1.73 m^2   Lactic acid, plasma   Result Value Ref Range    Lactate (Lactic Acid) 1.9 0.5 - 2.2 mmol/L   Urinalysis, Reflex to Urine Culture Urine, Clean Catch    Specimen: Urine   Result Value Ref Range    Specimen UA Urine, Clean Catch     Color, UA Yellow Yellow, Straw, [place]    Appearance, UA Clear Clear    pH, UA 8.0 5.0 - 8.0    Specific Gravity, UA 1.010 1.005 - 1.030    Protein, UA Negative Negative     Glucose, UA Negative Negative    Ketones, UA Negative Negative    Bilirubin (UA) Negative Negative    Occult Blood UA Negative Negative    Nitrite, UA Negative Negative    Urobilinogen, UA 1.0 <2.0 EU/dL    Leukocytes, UA 1+ (A) Negative   Urinalysis Microscopic   Result Value Ref Range    WBC, UA 2 0 - 5 /hpf    Bacteria Occasional None-Occ /hpf    Microscopic Comment SEE COMMENT    CBC auto differential   Result Value Ref Range    WBC 3.41 (L) 3.90 - 12.70 K/uL    RBC 2.85 (L) 4.00 - 5.40 M/uL    Hemoglobin 9.8 (L) 12.0 - 16.0 g/dL    Hematocrit 29.0 (L) 37.0 - 48.5 %     (H) 82 - 98 fL    MCH 34.4 (H) 27.0 - 31.0 pg    MCHC 33.8 32.0 - 36.0 g/dL    RDW 15.8 (H) 11.5 - 14.5 %    Platelets 121 (L) 150 - 450 K/uL    MPV 11.7 9.2 - 12.9 fL    Immature Granulocytes 0.9 (H) 0.0 - 0.5 %    Gran # (ANC) 1.1 (L) 1.8 - 7.7 K/uL    Immature Grans (Abs) 0.03 0.00 - 0.04 K/uL    Lymph # 1.7 1.0 - 4.8 K/uL    Mono # 0.4 0.3 - 1.0 K/uL    Eos # 0.2 0.0 - 0.5 K/uL    Baso # 0.01 0.00 - 0.20 K/uL    nRBC 0 0 /100 WBC    Gran % 31.3 (L) 38.0 - 73.0 %    Lymph % 48.7 (H) 18.0 - 48.0 %    Mono % 12.6 4.0 - 15.0 %    Eosinophil % 6.2 0.0 - 8.0 %    Basophil % 0.3 0.0 - 1.9 %    Platelet Estimate Decreased (A)     Aniso Slight     Ovalocytes Occasional     Differential Method Automated      *Note: Due to a large number of results and/or encounters for the requested time period, some results have not been displayed. A complete set of results can be found in Results Review.         Imaging Results:  Imaging Results          CT Abdomen Pelvis With Contrast (Final result)  Result time 02/26/22 22:05:57    Final result by Raphael Morris MD (02/26/22 22:05:57)                 Impression:      No definite acute abnormality.    Additional details as above.    All CT scans at this facility are performed  using dose modulation techniques as appropriate to performed exam including the following:  automated exposure control; adjustment  of mA and/or kV according to the patients size (this includes techniques or standardized protocols for targeted exams where dose is matched to indication/reason for exam: i.e. extremities or head);  iterative reconstruction technique.      Electronically signed by: Raphael Morris  Date:    02/26/2022  Time:    22:05             Narrative:    EXAMINATION:  CT ABDOMEN PELVIS WITH CONTRAST    CLINICAL HISTORY:  Abdominal pain, acute, nonlocalized;lower abdominal pain;    TECHNIQUE:  Low dose axial images, sagittal and coronal reformations were obtained from the lung bases to the pubic symphysis.  Contrast was administered.    COMPARISON:  Multiple priors.    FINDINGS:  Heart: Normal in size. No pericardial effusion.    Lung Bases: Well aerated, without consolidation or pleural fluid.    Liver: Normal in size and attenuation, with no focal hepatic lesions.    Gallbladder: Surgically absent.    Bile Ducts: No evidence of dilated ducts.    Pancreas: No mass or peripancreatic fat stranding.    Spleen: Prior granulomatous disease.    Adrenals: Unremarkable.    Kidneys/ Ureters: No obstructive uropathy.  No hydronephrosis.  No nonobstructive nephrolithiasis.  Left renal simple renal cyst.    Bladder: No evidence of wall thickening.    Reproductive organs: Known cervical mass is not well delineated on today's exam.  Reproductive organs otherwise unremarkable.    GI Tract/Mesentery: No evidence of bowel obstruction or inflammation.    Peritoneal Space: No ascites. No free air.    Retroperitoneum: No significant adenopathy.    Abdominal wall: Unremarkable.    Vasculature: Moderate aortoiliac atherosclerosis.  No aneurysm.    Bones: No acute fracture.  L5-S1 degenerative change.                                    The Emergency Provider reviewed the vital signs and test results, which are outlined above.     ED Discussion       10:38 PM: Reassessed pt at this time. Discussed with pt all pertinent ED information and results.  Discussed pt dx and plan of tx. Gave pt all f/u and return to the ED instructions. All questions and concerns were addressed at this time. Pt expresses understanding of information and instructions, and is comfortable with plan to discharge. Pt is stable for discharge.    I discussed with patient and/or family/caretaker that evaluation in the ED does not suggest any emergent or life threatening medical conditions requiring immediate intervention beyond what was provided in the ED, and I believe patient is safe for discharge.  Regardless, an unremarkable evaluation in the ED does not preclude the development or presence of a serious of life threatening condition. As such, patient was instructed to return immediately for any worsening or change in current symptoms.         Medical Decision Making:   Clinical Tests:   Lab Tests: Ordered and Reviewed  Radiological Study: Ordered and Reviewed           ED Medication(s):  Medications   sodium chloride 0.9% bolus 1,000 mL (1,000 mLs Intravenous New Bag 2/26/22 2056)   HYDROmorphone (PF) injection 1 mg (1 mg Intravenous Given 2/26/22 2052)   ondansetron injection 4 mg (4 mg Intravenous Given 2/26/22 2053)   iohexoL (OMNIPAQUE 350) injection 100 mL (100 mLs Intravenous Given 2/26/22 2143)       New Prescriptions    No medications on file        Follow-up Information     Kishore Persaud MD. Call in 2 days.    Specialty: Family Medicine  Contact information:  [address] [facility] [address] [phone]             Hem/ONC. Call in 3 days.           [facility] - Emergency Dept..    Specialty: Emergency Medicine  Why: If symptoms worsen  Contact information:  [address] [facility] [address] [phone]                           Scribe Attestation:   Scribe #1: I performed the above scribed service and the documentation accurately describes the services I performed. I attest to the accuracy of the note.     Attending:   Physician  Attestation Statement for Scribe #1: I, Morteza Childs, personally performed the services described in this documentation, as scribed by Nicola Lucio, in my presence, and it is both accurate and complete.           Clinical Impression       ICD-10-CM ICD-9-CM   1. Lower abdominal pain  R10.30 789.09   2. Anemia, unspecified type  D64.9 285.9       Disposition:   Disposition: Discharged  Condition: Stable         Morteza Chilsd MD  02/27/22 0113

## 2022-02-27 NOTE — FIRST PROVIDER EVALUATION
"Medical screening exam completed.  I have conducted a focused provider triage encounter, findings are as follows:    Brief history of present illness:  Pt. C/o lower abd pain since receiving chemo for cervical ca.     Vitals:    02/26/22 1920 02/26/22 1921   BP: 128/82    Pulse: 98    Resp: 20    Temp:  98.1 °F (36.7 °C)   TempSrc:  Oral   SpO2: 98%    Height: 5' 7" (1.702 m)          Preliminary workup initiated; this workup will be continued and followed by the physician or advanced practice provider that is assigned to the patient when roomed.  "

## 2022-02-28 ENCOUNTER — PATIENT MESSAGE (OUTPATIENT)
Dept: PALLIATIVE MEDICINE | Facility: CLINIC | Age: 52
End: 2022-02-28
Payer: MEDICAID

## 2022-03-01 ENCOUNTER — TELEPHONE (OUTPATIENT)
Dept: INTERNAL MEDICINE | Facility: CLINIC | Age: 52
End: 2022-03-01
Payer: MEDICAID

## 2022-03-01 ENCOUNTER — OFFICE VISIT (OUTPATIENT)
Dept: INTERNAL MEDICINE | Facility: CLINIC | Age: 52
End: 2022-03-01
Payer: MEDICAID

## 2022-03-01 VITALS
HEIGHT: 67 IN | OXYGEN SATURATION: 99 % | WEIGHT: 208.75 LBS | SYSTOLIC BLOOD PRESSURE: 132 MMHG | RESPIRATION RATE: 18 BRPM | DIASTOLIC BLOOD PRESSURE: 90 MMHG | BODY MASS INDEX: 32.76 KG/M2 | HEART RATE: 104 BPM | TEMPERATURE: 99 F

## 2022-03-01 DIAGNOSIS — C73 PAPILLARY THYROID CARCINOMA: ICD-10-CM

## 2022-03-01 DIAGNOSIS — C53.0 MALIGNANT NEOPLASM OF ENDOCERVIX: ICD-10-CM

## 2022-03-01 DIAGNOSIS — C50.919 PRIMARY MALIGNANT NEOPLASM OF BREAST WITH METASTASIS TO OTHER SITE, UNSPECIFIED LATERALITY: ICD-10-CM

## 2022-03-01 DIAGNOSIS — D61.818 PANCYTOPENIA: Primary | ICD-10-CM

## 2022-03-01 DIAGNOSIS — R10.30 LOWER ABDOMINAL PAIN: ICD-10-CM

## 2022-03-01 PROCEDURE — 3008F PR BODY MASS INDEX (BMI) DOCUMENTED: ICD-10-PCS | Mod: CPTII,,, | Performed by: FAMILY MEDICINE

## 2022-03-01 PROCEDURE — 3080F DIAST BP >= 90 MM HG: CPT | Mod: CPTII,,, | Performed by: FAMILY MEDICINE

## 2022-03-01 PROCEDURE — 3075F PR MOST RECENT SYSTOLIC BLOOD PRESS GE 130-139MM HG: ICD-10-PCS | Mod: CPTII,,, | Performed by: FAMILY MEDICINE

## 2022-03-01 PROCEDURE — 3080F PR MOST RECENT DIASTOLIC BLOOD PRESSURE >= 90 MM HG: ICD-10-PCS | Mod: CPTII,,, | Performed by: FAMILY MEDICINE

## 2022-03-01 PROCEDURE — 3008F BODY MASS INDEX DOCD: CPT | Mod: CPTII,,, | Performed by: FAMILY MEDICINE

## 2022-03-01 PROCEDURE — 99999 PR PBB SHADOW E&M-EST. PATIENT-LVL V: ICD-10-PCS | Mod: PBBFAC,,, | Performed by: FAMILY MEDICINE

## 2022-03-01 PROCEDURE — 99214 PR OFFICE/OUTPT VISIT, EST, LEVL IV, 30-39 MIN: ICD-10-PCS | Mod: S$PBB,,, | Performed by: FAMILY MEDICINE

## 2022-03-01 PROCEDURE — 3075F SYST BP GE 130 - 139MM HG: CPT | Mod: CPTII,,, | Performed by: FAMILY MEDICINE

## 2022-03-01 PROCEDURE — 1159F MED LIST DOCD IN RCRD: CPT | Mod: CPTII,,, | Performed by: FAMILY MEDICINE

## 2022-03-01 PROCEDURE — 99214 OFFICE O/P EST MOD 30 MIN: CPT | Mod: S$PBB,,, | Performed by: FAMILY MEDICINE

## 2022-03-01 PROCEDURE — 99999 PR PBB SHADOW E&M-EST. PATIENT-LVL V: CPT | Mod: PBBFAC,,, | Performed by: FAMILY MEDICINE

## 2022-03-01 PROCEDURE — 99215 OFFICE O/P EST HI 40 MIN: CPT | Mod: PBBFAC | Performed by: FAMILY MEDICINE

## 2022-03-01 PROCEDURE — 1159F PR MEDICATION LIST DOCUMENTED IN MEDICAL RECORD: ICD-10-PCS | Mod: CPTII,,, | Performed by: FAMILY MEDICINE

## 2022-03-01 NOTE — TELEPHONE ENCOUNTER
----- Message from Annie Patrick sent at 3/1/2022 10:30 AM CST -----  Pt will be 30 minutes to 1 hour late due to car trouble. Pt stated that she needs to be seen today.

## 2022-03-03 ENCOUNTER — CLINICAL SUPPORT (OUTPATIENT)
Dept: REHABILITATION | Facility: HOSPITAL | Age: 52
End: 2022-03-03
Payer: MEDICAID

## 2022-03-03 DIAGNOSIS — M25.561 CHRONIC PAIN OF BOTH KNEES: Primary | ICD-10-CM

## 2022-03-03 DIAGNOSIS — G89.29 CHRONIC PAIN OF BOTH KNEES: Primary | ICD-10-CM

## 2022-03-03 DIAGNOSIS — M25.562 CHRONIC PAIN OF BOTH KNEES: Primary | ICD-10-CM

## 2022-03-03 PROCEDURE — 97110 THERAPEUTIC EXERCISES: CPT | Mod: CQ

## 2022-03-03 NOTE — PROGRESS NOTES
[facility]   Physical Therapy Assistant Treatment Note     Name: Josey Schmitz [place]  Clinic Number: [id number]    Therapy Diagnosis:   Encounter Diagnosis   Name Primary?    Chronic pain of both knees Yes     Physician: Dakota Reina MD    Visit Date: 3/3/2022      Physician Orders: PT Eval and Treat   Medical Diagnosis from Referral: Osteoarthritis of both knees   Evaluation Date: 2/9/2022  Authorization Period Expiration: 2/4/2023  Plan of Care Expiration: 5/1/2022  Progress Note Due: 10th visit  Visit # / Visits authorized: 3/ 12   FOTO: 1/ 3      Precautions: Standard and Fall    PTA Visit #: 2/5     Time In: 1:15  Time Out: 2:00  Total Billable Time: 45 minutes    SUBJECTIVE     Pt reports: she went to ER and found out that her platelet level was low. Her knee pain is basically gone since she has been doing therapy. She fell a few days ago just bending over to get water. She loses balance often. She wants to start yoga.  She was compliant with home exercise program.  Response to previous treatment: no complaints   Functional change: nothing significant    Pain: 0/10   Location: right knee      OBJECTIVE     Objective Measures updated at progress report unless specified.     Treatment     Josey received the treatments listed below:      Josey received the treatments listed below:       · THERAPEUTIC EXERCISES to develop strength, endurance, ROM, flexibility, posture and core stabilization for 45 minutes including SLR, BRIDGE, hip clam, abd, 1x10 each  · prone tke 1x10  · Quadruped anterior posterior weight shifts and cat/ cow  · Gait training in parallel bars  · Lateral steps in parallel bars  · LTR/ DKTC with ball 2' each  · Heel raises 2x10  · Nustep 5' for mobility        Plan for Next Visit: nustep, shuttle, estim           Patient Education and Home Exercises       Education provided:   - YES     Written Home Exercises Provided: yes. Exercises were reviewed and Josey was  "able to demonstrate them prior to the end of the session.  Josey demonstrated good  understanding of the education provided. See EMR under Patient Instructions for exercises provided during therapy sessions.    ASSESSMENT     Patient presents to therapy without assistive device demonstrating improvement in gait pattern although still some unsteadiness. She has significant improvement in tolerance to therapy session compared to last session. She is able to lift leg and perform bed mobility at ease also demonstrating ability to get into quadruped. She did have some discomfort in abdomen with bridges but overall no complaints of knee pain. Patient and her  were educated on proper gait pattern as she lacks toe off phase of gait. Patient has posterior chain weakness and will benefit from further strengthening and balance activities to improve safety     Josey Is progressing well towards her goals.   Pt prognosis is Good.     Pt will continue to benefit from skilled outpatient physical therapy to address the deficits listed in the problem list box on initial evaluation, provide pt/family education and to maximize pt's level of independence in the home and community environment.     Pt's spiritual, cultural and educational needs considered and pt agreeable to plan of care and goals.     Anticipated barriers to physical therapy: none      Goals: Reviewed:2/9/2022    Short Term Goals: In 6 weeks:  1.Patient to be educated on HEP.  2.Patient to demo increased right knee PROM to 0-110, in order to assist with normalizing gait pattern.  3.Patient to increase strength right LE by 1/2 grade, in order to improve endurance and increase ability to ambulate for increased time..  4.Patient to have decreased pain to 4/10 at worst, to improve QOL.  5.Patient complete 4 reps during 30" STS to demo improved transfer efficiency.      Long Term Goals: In 10 weeks  1. Patient to perform daily activities including standing from " toilet and bed without increased symptoms.  2. Patient to demonstrate increased right knee AROM to 0-115, in order to assist with normalizing gait pattern..  3. Patient to demonstrate increased right LE strength to 4+/5, in order to improve endurance and increase ability to ambulate for increased time.  4. Patient to have decreased pain to 2/10 at worst, to improve QOL.  5. Patient to improve score on the FOTO to 47% or less, to improve QOL.  6. Patient to increase 30 second sit to stand to 8 repetitions, to decrease fall risk.           PLAN      Plan of care Certification: 2/9/2022 to 5/1/2022.     Outpatient Physical Therapy 2 times weekly for 10 weeks to include the following interventions: Cervical/Lumbar Traction, Electrical Stimulation knees/lumbar, Gait Training, Manual Therapy, Moist Heat/ Ice, Neuromuscular Re-ed, Orthotic Management and Training, Patient Education, Therapeutic Activities, Therapeutic Exercise and Ultrasound.       Shannon Cortez, PTA

## 2022-03-04 ENCOUNTER — SPECIALTY PHARMACY (OUTPATIENT)
Dept: PHARMACY | Facility: CLINIC | Age: 52
End: 2022-03-04
Payer: MEDICAID

## 2022-03-04 DIAGNOSIS — C50.811 MALIGNANT NEOPLASM OF OVERLAPPING SITES OF RIGHT BREAST IN FEMALE, ESTROGEN RECEPTOR POSITIVE: Primary | ICD-10-CM

## 2022-03-04 DIAGNOSIS — Z17.0 MALIGNANT NEOPLASM OF OVERLAPPING SITES OF RIGHT BREAST IN FEMALE, ESTROGEN RECEPTOR POSITIVE: Primary | ICD-10-CM

## 2022-03-04 NOTE — TELEPHONE ENCOUNTER
Specialty Pharmacy - Refill Coordination    Specialty Medication Orders Linked to Encounter    Flowsheet Row Most Recent Value   Medication #1 letrozole (FEMARA) 2.5 mg Tab (Order#[id number], Rx#[id number])          Refill Questions - Documented Responses    Flowsheet Row Most Recent Value   Patient Availability and HIPAA Verification    HIPAA/medical authority confirmed? Yes   Relationship to patient of person spoken to? Self   Refill Screening Questions    Changes to allergies? No   Changes to medications? No   New conditions since last clinic visit? No   Unplanned office visit, urgent care, ED, or hospital admission in the last 4 weeks? No   How does patient/caregiver feel medication is working? Excellent   Financial problems or insurance changes? No   How many doses of your specialty medications were missed in the last 4 weeks? 0   Would patient like to speak to a pharmacist? No   When does the patient need to receive the medication? 03/09/22   Refill Delivery Questions    How will the patient receive the medication? Delivery Pauly   When does the patient need to receive the medication? 03/09/22   Shipping Address Home   Address in [facility] confirmed and updated if neccessary? Yes   Expected Copay ($) 0   Is the patient able to afford the medication copay? Yes   Payment Method zero copay   Days supply of Refill 30   Supplies needed? No supplies needed   Refill activity completed? Yes   Refill activity plan Refill scheduled   Shipment/Pickup Date: 03/08/22          Current Outpatient Medications   Medication Sig    albuterol (PROVENTIL) 2.5 mg /3 mL (0.083 %) nebulizer solution Take 3 mLs (2.5 mg total) by nebulization every 4 to 6 hours as needed for Wheezing or Shortness of Breath.    albuterol (PROVENTIL/VENTOLIN HFA) 90 mcg/actuation inhaler Inhale 2 puffs into the lungs every 4 (four) hours as needed for Wheezing or Shortness of Breath.    ALPRAZolam (XANAX) 1 MG tablet Take 0.5-1 tablets (0.5-1  mg total) by mouth 2 (two) times daily as needed for Anxiety.    buPROPion (WELLBUTRIN SR) 150 MG TBSR 12 hr tablet Take 1 tablet (150 mg total) by mouth 2 (two) times daily. Take 1 tablet (150mg) once daily for 1 week then increase to twice daily    calcium carbonate 400 mg calcium (1,000 mg) Chew Take 2,000 mg by mouth once daily.    cyanocobalamin (VITAMIN B-12) 1000 MCG tablet Take 1 tablet (1,000 mcg total) by mouth once daily.    dexAMETHasone (DECADRON) 4 MG Tab Take 2 tablets (8 mg total) by mouth once daily. Take as directed on days 2, 3, and 4 of your chemotherapy cycle.    diphenoxylate-atropine 2.5-0.025 mg (LOMOTIL) 2.5-0.025 mg per tablet Take 1 tablet by mouth 4 (four) times daily as needed for Diarrhea.    ergocalciferol (VITAMIN D2) 50,000 unit Cap Take 5,000 Units by mouth once daily at 6am.     HYDROcodone-acetaminophen (NORCO)  mg per tablet Take 1 tablet by mouth every 4 (four) hours as needed for Pain. No more than 4 doses/ day    hydrOXYzine HCL (ATARAX) 25 MG tablet Take 1 tablet (25 mg total) by mouth 3 (three) times daily as needed for Itching.    ipratropium (ATROVENT) 42 mcg (0.06 %) nasal spray 2 sprays by Nasal route 4 (four) times daily. As needed for rhinitis. Take in evening before bed and in the morning    letrozole (FEMARA) 2.5 mg Tab Take 1 tablet (2.5 mg) by mouth once daily.    levothyroxine (EUTHYROX) 200 MCG tablet Take 1 tablet (200 mcg total) by mouth once daily.    magnesium oxide (MAGOX) 400 mg (241.3 mg magnesium) tablet Take 1 tablet (400 mg total) by mouth once daily.    morphine (MS CONTIN) 15 MG 12 hr tablet Take 1 tablet (15 mg total) by mouth every 12 (twelve) hours.    multivitamin (THERAGRAN) per tablet Take 1 tablet by mouth once daily.    OLANZapine (ZYPREXA) 5 MG tablet Take 1 tablet (5 mg total) by mouth nightly. Take on evenings of chemotherapy days 1, 2, 3 and 4    OLANZapine (ZYPREXA) 5 MG tablet Take 1 tablet (5 mg total) by mouth  every evening. Take as directed on days 1-4 of your chemotherapy cycle. May increase to 10 mg if breakthrough nausea occurs.    ondansetron (ZOFRAN-ODT) 8 MG TbDL Take 1 tablet (8 mg total) by mouth every 8 (eight) hours as needed (nausea/vomiting).    palbociclib (IBRANCE) 125 mg Cap Take one capsule (125 mg) by mouth once daily on days 1-21 of each 28-day cycle.    potassium chloride SA (K-DUR,KLOR-CON) 20 MEQ tablet Take 1 tablet (20 mEq total) by mouth once daily. Take daily for 3 days.    prochlorperazine (COMPAZINE) 5 MG tablet Take 1 tablet (5 mg total) by mouth every 6 (six) hours as needed for Nausea.   Last reviewed on 3/1/2022 11:38 AM by Radha Rosas MA    Review of patient's allergies indicates:   Allergen Reactions    Gabapentin Swelling     Note: unilateral joint edema, unclear if due to gabapentin    Nsaids (non-steroidal anti-inflammatory drug) Other (See Comments)     Instructed not to take NSAID drugs with chemo pill.    Clindamycin Rash    Vancomycin analogues Itching    Last reviewed on  3/1/2022 11:37 AM by Radha Rosas    Spoke with Mrs. Flores. She denied missed doses. She reported a 4 to 5-day supply in her possession. She denied any changes in allergies, medical conditions, or medications. [facility]. $0 copay at 004. Confirmed 2 patient identifiers, allergies, medication list, comorbidities, shipping address, and payment information.    Tasks added this encounter   4/1/2022 - Refill Call (Auto Added)   Tasks due within next 3 months   No tasks due.     Bright Guallpa, PharmD  [facility]  [address]  [place] [address]  Phone: [phone]  Fax: [phone]

## 2022-03-07 NOTE — TELEPHONE ENCOUNTER
Incoming message from [facility] informing me that RTS until 3/9. Called Mrs. Flores and confirmed we can send on 3/9 instead.

## 2022-03-08 ENCOUNTER — LAB VISIT (OUTPATIENT)
Dept: LAB | Facility: HOSPITAL | Age: 52
End: 2022-03-08
Attending: FAMILY MEDICINE
Payer: MEDICAID

## 2022-03-08 DIAGNOSIS — D61.818 PANCYTOPENIA: ICD-10-CM

## 2022-03-08 LAB
BASOPHILS # BLD AUTO: 0.11 K/UL (ref 0–0.2)
BASOPHILS NFR BLD: 1.2 % (ref 0–1.9)
DIFFERENTIAL METHOD: ABNORMAL
EOSINOPHIL # BLD AUTO: 0.1 K/UL (ref 0–0.5)
EOSINOPHIL NFR BLD: 0.7 % (ref 0–8)
ERYTHROCYTE [DISTWIDTH] IN BLOOD BY AUTOMATED COUNT: 18 % (ref 11.5–14.5)
HCT VFR BLD AUTO: 39.9 % (ref 37–48.5)
HGB BLD-MCNC: 12.6 G/DL (ref 12–16)
IMM GRANULOCYTES # BLD AUTO: 0.35 K/UL (ref 0–0.04)
IMM GRANULOCYTES NFR BLD AUTO: 3.8 % (ref 0–0.5)
LYMPHOCYTES # BLD AUTO: 2.2 K/UL (ref 1–4.8)
LYMPHOCYTES NFR BLD: 24 % (ref 18–48)
MCH RBC QN AUTO: 34.9 PG (ref 27–31)
MCHC RBC AUTO-ENTMCNC: 31.6 G/DL (ref 32–36)
MCV RBC AUTO: 111 FL (ref 82–98)
MONOCYTES # BLD AUTO: 0.9 K/UL (ref 0.3–1)
MONOCYTES NFR BLD: 10 % (ref 4–15)
NEUTROPHILS # BLD AUTO: 5.6 K/UL (ref 1.8–7.7)
NEUTROPHILS NFR BLD: 60.3 % (ref 38–73)
NRBC BLD-RTO: 0 /100 WBC
PLATELET # BLD AUTO: 270 K/UL (ref 150–450)
PMV BLD AUTO: 11.5 FL (ref 9.2–12.9)
RBC # BLD AUTO: 3.61 M/UL (ref 4–5.4)
WBC # BLD AUTO: 9.22 K/UL (ref 3.9–12.7)

## 2022-03-08 PROCEDURE — 36415 COLL VENOUS BLD VENIPUNCTURE: CPT | Performed by: FAMILY MEDICINE

## 2022-03-08 PROCEDURE — 85025 COMPLETE CBC W/AUTO DIFF WBC: CPT | Performed by: FAMILY MEDICINE

## 2022-03-10 ENCOUNTER — OFFICE VISIT (OUTPATIENT)
Dept: PALLIATIVE MEDICINE | Facility: CLINIC | Age: 52
End: 2022-03-10
Payer: MEDICAID

## 2022-03-10 ENCOUNTER — OFFICE VISIT (OUTPATIENT)
Dept: HEMATOLOGY/ONCOLOGY | Facility: CLINIC | Age: 52
End: 2022-03-10
Payer: MEDICAID

## 2022-03-10 ENCOUNTER — LAB VISIT (OUTPATIENT)
Dept: LAB | Facility: HOSPITAL | Age: 52
End: 2022-03-10
Attending: INTERNAL MEDICINE
Payer: MEDICAID

## 2022-03-10 VITALS
BODY MASS INDEX: 32.15 KG/M2 | SYSTOLIC BLOOD PRESSURE: 124 MMHG | OXYGEN SATURATION: 100 % | HEART RATE: 110 BPM | OXYGEN SATURATION: 100 % | BODY MASS INDEX: 32.21 KG/M2 | SYSTOLIC BLOOD PRESSURE: 138 MMHG | WEIGHT: 204.81 LBS | HEIGHT: 67 IN | TEMPERATURE: 98 F | DIASTOLIC BLOOD PRESSURE: 54 MMHG | WEIGHT: 205.25 LBS | RESPIRATION RATE: 16 BRPM | DIASTOLIC BLOOD PRESSURE: 92 MMHG | HEIGHT: 67 IN | RESPIRATION RATE: 20 BRPM | HEART RATE: 105 BPM | TEMPERATURE: 98 F

## 2022-03-10 DIAGNOSIS — Z51.5 ENCOUNTER FOR PALLIATIVE CARE: Primary | ICD-10-CM

## 2022-03-10 DIAGNOSIS — R94.6 THYROID FUNCTION STUDY ABNORMALITY: ICD-10-CM

## 2022-03-10 DIAGNOSIS — C53.9 RECURRENT CERVICAL CANCER: ICD-10-CM

## 2022-03-10 DIAGNOSIS — C53.0 MALIGNANT NEOPLASM OF ENDOCERVIX: ICD-10-CM

## 2022-03-10 DIAGNOSIS — D53.9 MACROCYTIC ANEMIA: ICD-10-CM

## 2022-03-10 DIAGNOSIS — G89.3 NEOPLASM RELATED PAIN: ICD-10-CM

## 2022-03-10 DIAGNOSIS — G89.3 CANCER ASSOCIATED PAIN: ICD-10-CM

## 2022-03-10 DIAGNOSIS — T45.1X5A CHEMOTHERAPY-INDUCED NAUSEA: ICD-10-CM

## 2022-03-10 DIAGNOSIS — R11.0 CHEMOTHERAPY-INDUCED NAUSEA: ICD-10-CM

## 2022-03-10 DIAGNOSIS — C53.0 MALIGNANT NEOPLASM OF ENDOCERVIX: Primary | ICD-10-CM

## 2022-03-10 LAB
ALBUMIN SERPL BCP-MCNC: 4 G/DL (ref 3.5–5.2)
ALP SERPL-CCNC: 75 U/L (ref 55–135)
ALT SERPL W/O P-5'-P-CCNC: 16 U/L (ref 10–44)
ANION GAP SERPL CALC-SCNC: 11 MMOL/L (ref 8–16)
ANISOCYTOSIS BLD QL SMEAR: SLIGHT
AST SERPL-CCNC: 17 U/L (ref 10–40)
BASOPHILS # BLD AUTO: 0.06 K/UL (ref 0–0.2)
BASOPHILS NFR BLD: 0.7 % (ref 0–1.9)
BILIRUB SERPL-MCNC: 0.4 MG/DL (ref 0.1–1)
BUN SERPL-MCNC: 11 MG/DL (ref 6–20)
CALCIUM SERPL-MCNC: 9.2 MG/DL (ref 8.7–10.5)
CHLORIDE SERPL-SCNC: 102 MMOL/L (ref 95–110)
CO2 SERPL-SCNC: 26 MMOL/L (ref 23–29)
CREAT SERPL-MCNC: 1.2 MG/DL (ref 0.5–1.4)
DACRYOCYTES BLD QL SMEAR: ABNORMAL
DIFFERENTIAL METHOD: ABNORMAL
EOSINOPHIL # BLD AUTO: 0.1 K/UL (ref 0–0.5)
EOSINOPHIL NFR BLD: 0.6 % (ref 0–8)
ERYTHROCYTE [DISTWIDTH] IN BLOOD BY AUTOMATED COUNT: 17.1 % (ref 11.5–14.5)
EST. GFR  (AFRICAN AMERICAN): 60 ML/MIN/1.73 M^2
EST. GFR  (NON AFRICAN AMERICAN): 52 ML/MIN/1.73 M^2
GLUCOSE SERPL-MCNC: 109 MG/DL (ref 70–110)
HCT VFR BLD AUTO: 36.5 % (ref 37–48.5)
HGB BLD-MCNC: 11.8 G/DL (ref 12–16)
IMM GRANULOCYTES # BLD AUTO: 0.33 K/UL (ref 0–0.04)
IMM GRANULOCYTES NFR BLD AUTO: 3.6 % (ref 0–0.5)
LYMPHOCYTES # BLD AUTO: 2.8 K/UL (ref 1–4.8)
LYMPHOCYTES NFR BLD: 31.2 % (ref 18–48)
MCH RBC QN AUTO: 34.3 PG (ref 27–31)
MCHC RBC AUTO-ENTMCNC: 32.3 G/DL (ref 32–36)
MCV RBC AUTO: 106 FL (ref 82–98)
MONOCYTES # BLD AUTO: 1 K/UL (ref 0.3–1)
MONOCYTES NFR BLD: 10.9 % (ref 4–15)
NEUTROPHILS # BLD AUTO: 4.8 K/UL (ref 1.8–7.7)
NEUTROPHILS NFR BLD: 53 % (ref 38–73)
NRBC BLD-RTO: 0 /100 WBC
OVALOCYTES BLD QL SMEAR: ABNORMAL
PLATELET # BLD AUTO: 262 K/UL (ref 150–450)
PLATELET BLD QL SMEAR: ABNORMAL
PMV BLD AUTO: 10.8 FL (ref 9.2–12.9)
POLYCHROMASIA BLD QL SMEAR: ABNORMAL
POTASSIUM SERPL-SCNC: 4.2 MMOL/L (ref 3.5–5.1)
PROT SERPL-MCNC: 7.2 G/DL (ref 6–8.4)
RBC # BLD AUTO: 3.44 M/UL (ref 4–5.4)
SCHISTOCYTES BLD QL SMEAR: PRESENT
SODIUM SERPL-SCNC: 139 MMOL/L (ref 136–145)
STOMATOCYTES BLD QL SMEAR: PRESENT
T4 FREE SERPL-MCNC: 0.66 NG/DL (ref 0.71–1.51)
TSH SERPL DL<=0.005 MIU/L-ACNC: 109.27 UIU/ML (ref 0.4–4)
WBC # BLD AUTO: 9.08 K/UL (ref 3.9–12.7)

## 2022-03-10 PROCEDURE — 84443 ASSAY THYROID STIM HORMONE: CPT | Performed by: INTERNAL MEDICINE

## 2022-03-10 PROCEDURE — 36415 COLL VENOUS BLD VENIPUNCTURE: CPT | Performed by: INTERNAL MEDICINE

## 2022-03-10 PROCEDURE — 99215 OFFICE O/P EST HI 40 MIN: CPT | Mod: PBBFAC | Performed by: NURSE PRACTITIONER

## 2022-03-10 PROCEDURE — 3078F PR MOST RECENT DIASTOLIC BLOOD PRESSURE < 80 MM HG: ICD-10-PCS | Mod: CPTII,,, | Performed by: INTERNAL MEDICINE

## 2022-03-10 PROCEDURE — 3008F BODY MASS INDEX DOCD: CPT | Mod: CPTII,,, | Performed by: INTERNAL MEDICINE

## 2022-03-10 PROCEDURE — 99215 OFFICE O/P EST HI 40 MIN: CPT | Mod: S$PBB,,, | Performed by: NURSE PRACTITIONER

## 2022-03-10 PROCEDURE — 1159F MED LIST DOCD IN RCRD: CPT | Mod: CPTII,,, | Performed by: NURSE PRACTITIONER

## 2022-03-10 PROCEDURE — 1159F PR MEDICATION LIST DOCUMENTED IN MEDICAL RECORD: ICD-10-PCS | Mod: CPTII,,, | Performed by: INTERNAL MEDICINE

## 2022-03-10 PROCEDURE — 99999 PR PBB SHADOW E&M-EST. PATIENT-LVL V: CPT | Mod: PBBFAC,,, | Performed by: NURSE PRACTITIONER

## 2022-03-10 PROCEDURE — 1160F PR REVIEW ALL MEDS BY PRESCRIBER/CLIN PHARMACIST DOCUMENTED: ICD-10-PCS | Mod: CPTII,,, | Performed by: INTERNAL MEDICINE

## 2022-03-10 PROCEDURE — 99999 PR PBB SHADOW E&M-EST. PATIENT-LVL V: ICD-10-PCS | Mod: PBBFAC,,, | Performed by: NURSE PRACTITIONER

## 2022-03-10 PROCEDURE — 99999 PR PBB SHADOW E&M-EST. PATIENT-LVL V: ICD-10-PCS | Mod: PBBFAC,,, | Performed by: INTERNAL MEDICINE

## 2022-03-10 PROCEDURE — 85025 COMPLETE CBC W/AUTO DIFF WBC: CPT | Performed by: INTERNAL MEDICINE

## 2022-03-10 PROCEDURE — 3080F PR MOST RECENT DIASTOLIC BLOOD PRESSURE >= 90 MM HG: ICD-10-PCS | Mod: CPTII,,, | Performed by: NURSE PRACTITIONER

## 2022-03-10 PROCEDURE — 1160F RVW MEDS BY RX/DR IN RCRD: CPT | Mod: CPTII,,, | Performed by: INTERNAL MEDICINE

## 2022-03-10 PROCEDURE — 3008F PR BODY MASS INDEX (BMI) DOCUMENTED: ICD-10-PCS | Mod: CPTII,,, | Performed by: NURSE PRACTITIONER

## 2022-03-10 PROCEDURE — 3080F DIAST BP >= 90 MM HG: CPT | Mod: CPTII,,, | Performed by: NURSE PRACTITIONER

## 2022-03-10 PROCEDURE — 99215 PR OFFICE/OUTPT VISIT, EST, LEVL V, 40-54 MIN: ICD-10-PCS | Mod: S$PBB,,, | Performed by: NURSE PRACTITIONER

## 2022-03-10 PROCEDURE — 99215 OFFICE O/P EST HI 40 MIN: CPT | Mod: S$PBB,,, | Performed by: INTERNAL MEDICINE

## 2022-03-10 PROCEDURE — 1159F PR MEDICATION LIST DOCUMENTED IN MEDICAL RECORD: ICD-10-PCS | Mod: CPTII,,, | Performed by: NURSE PRACTITIONER

## 2022-03-10 PROCEDURE — 3074F SYST BP LT 130 MM HG: CPT | Mod: CPTII,,, | Performed by: INTERNAL MEDICINE

## 2022-03-10 PROCEDURE — 3075F SYST BP GE 130 - 139MM HG: CPT | Mod: CPTII,,, | Performed by: NURSE PRACTITIONER

## 2022-03-10 PROCEDURE — 99215 PR OFFICE/OUTPT VISIT, EST, LEVL V, 40-54 MIN: ICD-10-PCS | Mod: S$PBB,,, | Performed by: INTERNAL MEDICINE

## 2022-03-10 PROCEDURE — 84439 ASSAY OF FREE THYROXINE: CPT | Performed by: INTERNAL MEDICINE

## 2022-03-10 PROCEDURE — 99215 OFFICE O/P EST HI 40 MIN: CPT | Mod: PBBFAC,27 | Performed by: INTERNAL MEDICINE

## 2022-03-10 PROCEDURE — 1159F MED LIST DOCD IN RCRD: CPT | Mod: CPTII,,, | Performed by: INTERNAL MEDICINE

## 2022-03-10 PROCEDURE — 3075F PR MOST RECENT SYSTOLIC BLOOD PRESS GE 130-139MM HG: ICD-10-PCS | Mod: CPTII,,, | Performed by: NURSE PRACTITIONER

## 2022-03-10 PROCEDURE — 99999 PR PBB SHADOW E&M-EST. PATIENT-LVL V: CPT | Mod: PBBFAC,,, | Performed by: INTERNAL MEDICINE

## 2022-03-10 PROCEDURE — 80053 COMPREHEN METABOLIC PANEL: CPT | Performed by: INTERNAL MEDICINE

## 2022-03-10 PROCEDURE — 3008F BODY MASS INDEX DOCD: CPT | Mod: CPTII,,, | Performed by: NURSE PRACTITIONER

## 2022-03-10 PROCEDURE — 3074F PR MOST RECENT SYSTOLIC BLOOD PRESSURE < 130 MM HG: ICD-10-PCS | Mod: CPTII,,, | Performed by: INTERNAL MEDICINE

## 2022-03-10 PROCEDURE — 3078F DIAST BP <80 MM HG: CPT | Mod: CPTII,,, | Performed by: INTERNAL MEDICINE

## 2022-03-10 PROCEDURE — 3008F PR BODY MASS INDEX (BMI) DOCUMENTED: ICD-10-PCS | Mod: CPTII,,, | Performed by: INTERNAL MEDICINE

## 2022-03-10 RX ORDER — SODIUM CHLORIDE 0.9 % (FLUSH) 0.9 %
10 SYRINGE (ML) INJECTION
Status: CANCELLED | OUTPATIENT
Start: 2022-03-11

## 2022-03-10 RX ORDER — MORPHINE SULFATE 30 MG/1
30 TABLET, FILM COATED, EXTENDED RELEASE ORAL EVERY 12 HOURS
Qty: 60 TABLET | Refills: 0 | Status: SHIPPED | OUTPATIENT
Start: 2022-03-10 | End: 2022-04-01 | Stop reason: SDUPTHER

## 2022-03-10 RX ORDER — DIPHENHYDRAMINE HYDROCHLORIDE 50 MG/ML
50 INJECTION INTRAMUSCULAR; INTRAVENOUS ONCE AS NEEDED
Status: CANCELLED | OUTPATIENT
Start: 2022-03-11

## 2022-03-10 RX ORDER — FAMOTIDINE 10 MG/ML
20 INJECTION INTRAVENOUS
Status: CANCELLED | OUTPATIENT
Start: 2022-03-11

## 2022-03-10 RX ORDER — HEPARIN 100 UNIT/ML
500 SYRINGE INTRAVENOUS
Status: CANCELLED | OUTPATIENT
Start: 2022-03-11

## 2022-03-10 RX ORDER — LORAZEPAM 2 MG/ML
0.5 INJECTION INTRAMUSCULAR
Status: CANCELLED
Start: 2022-03-11 | End: 2022-03-11

## 2022-03-10 RX ORDER — EPINEPHRINE 0.3 MG/.3ML
0.3 INJECTION SUBCUTANEOUS ONCE AS NEEDED
Status: CANCELLED | OUTPATIENT
Start: 2022-03-11

## 2022-03-10 NOTE — Clinical Note
Please be sure pet is ordered with covering md jamey shah that Also can you please clarify if she has increased levothyroxine and if so what dose she is taking?

## 2022-03-10 NOTE — PROGRESS NOTES
Subjective:      DATE OF VISIT: 3/10/2022   ?   Patient ID:?Josey Flores is a 51 y.o. female.?? MR#: [id number]   ?   PRIMARY PROVIDER: Dr. Asencio  ?   CHIEF COMPLAINT:  Follow-up  ?   ONCOLOGIC DIAGNOSIS:      Stage IV cervical squamous cell carcinoma    stage IV, T2 N1b M1, invasive breast carcinoma, ER/MN+, HER2-    Papillary thyroid carcinoma status post total thyroidectomy on 08/31/2017, mpT1b N0     Cervical HSIL MIREILLE 3 s/p LEEP, 2017  ?   CURRENT TREATMENT:    Letrozole  Carboplatin, paclitaxel and pembrolizumab, C1D1 1/28/22    PAST TREATMENT:    Palliative chemotherapy: cisplatin, paclitaxel and bevacizumab; 02/22/2021 cycle 1 day 1 -> bevacizumab maintenance after 6 cycles  Palbociclib and letrozole (palbociclib held with ongoing chemotherapy due to concern for cytopenias)    INTERVAL EVENTS    She continues to do very well on treatment staying well hydrated and no issues with anorexia/unintentional weight loss.  She did have emergency room visit last week for possible dehydration but feels back to baseline at this time.  She does note some indigestion 30 minutes following eating improved with bowel movement.    ROS  A comprehensive 14-point review of systems was reviewed with patient and was negative other than as specified above.   ?     Objective:      Physical Exam        Vitals:    03/10/22 1104   BP: (!) 124/54   Pulse: 105   Resp: 20   Temp: 97.8 °F (36.6 °C)      General appearance: Generally well appearing, in no acute distress.   Head, eyes, ears, nose, and throat: Oropharynx clear with moist mucous membranes.   Cardiovascular: Regular rate and rhythm, S1, S2, no audible murmurs.   Respiratory: Lungs clear to auscultation bilaterally.   Abdomen: nontender, nondistended.   Extremities: Warm, without edema.   Neurologic: Alert and oriented.  Skin: No rashes, ecchymoses or petechial lesion.   Psychiatric: normal mood and affect, conversant and appropriate    Laboratory:  ?   Lab Visit on  03/10/2022   Component Date Value Ref Range Status    WBC 03/10/2022 9.08  3.90 - 12.70 K/uL Final    RBC 03/10/2022 3.44 (A) 4.00 - 5.40 M/uL Final    Hemoglobin 03/10/2022 11.8 (A) 12.0 - 16.0 g/dL Final    Hematocrit 03/10/2022 36.5 (A) 37.0 - 48.5 % Final    MCV 03/10/2022 106 (A) 82 - 98 fL Final    MCH 03/10/2022 34.3 (A) 27.0 - 31.0 pg Final    MCHC 03/10/2022 32.3  32.0 - 36.0 g/dL Final    RDW 03/10/2022 17.1 (A) 11.5 - 14.5 % Final    Platelets 03/10/2022 262  150 - 450 K/uL Final    MPV 03/10/2022 10.8  9.2 - 12.9 fL Final    Sodium 03/10/2022 139  136 - 145 mmol/L Final    Potassium 03/10/2022 4.2  3.5 - 5.1 mmol/L Final    Chloride 03/10/2022 102  95 - 110 mmol/L Final    CO2 03/10/2022 26  23 - 29 mmol/L Final    Glucose 03/10/2022 109  70 - 110 mg/dL Final    BUN 03/10/2022 11  6 - 20 mg/dL Final    Creatinine 03/10/2022 1.2  0.5 - 1.4 mg/dL Final    Calcium 03/10/2022 9.2  8.7 - 10.5 mg/dL Final    Total Protein 03/10/2022 7.2  6.0 - 8.4 g/dL Final    Albumin 03/10/2022 4.0  3.5 - 5.2 g/dL Final    Total Bilirubin 03/10/2022 0.4  0.1 - 1.0 mg/dL Final    Alkaline Phosphatase 03/10/2022 75  55 - 135 U/L Final    AST 03/10/2022 17  10 - 40 U/L Final    ALT 03/10/2022 16  10 - 44 U/L Final    Anion Gap 03/10/2022 11  8 - 16 mmol/L Final    eGFR if  03/10/2022 60  >60 mL/min/1.73 m^2 Final    eGFR if non African American 03/10/2022 52 (A) >60 mL/min/1.73 m^2 Final      Lab Results   Component Value Date    WBC 9.08 03/10/2022    HGB 11.8 (L) 03/10/2022    HCT 36.5 (L) 03/10/2022     (H) 03/10/2022     03/10/2022         Chemistry        Component Value Date/Time     03/10/2022 1034    K 4.2 03/10/2022 1034     03/10/2022 1034    CO2 26 03/10/2022 1034    BUN 11 03/10/2022 1034    CREATININE 1.2 03/10/2022 1034     03/10/2022 1034        Component Value Date/Time    CALCIUM 9.2 03/10/2022 1034    ALKPHOS 75 03/10/2022 1034    AST  17 03/10/2022 1034    ALT 16 03/10/2022 1034    BILITOT 0.4 03/10/2022 1034    ESTGFRAFRICA 60 03/10/2022 1034    EGFRNONAA 52 (A) 03/10/2022 1034          ? IMAGING   Results for orders placed or performed during the hospital encounter of 01/11/22 (from the past 2160 hour(s))   NM PET CT Routine Skull to Mid Thigh    Impression    1. Significant uptake seen in the region of the cervix, consistent with recurrent/residual disease.  2. Remaining findings as discussed above and stable when compared to the prior study.      Electronically signed by: Bello Agee DO  Date:    01/11/2022  Time:    16:40     *Note: Due to a large number of results and/or encounters for the requested time period, some results have not been displayed. A complete set of results can be found in Results Review.     No results found. However, due to the size of the patient record, not all encounters were searched. Please check Results Review for a complete set of results.    PATHOLOGY  2/2021  Station 7 lymph node, fine needle aspiration (8 smears, 1 cell block):       -  Positive for malignant cells, morphologically consistent with   metastatic squamous cell carcinoma     Assessment/Plan:       1. Malignant neoplasm of endocervix    2. Recurrent cervical cancer          Plan:     # metastatic squamous cell carcinoma of the cervix SCC cervix:  history of HSIL Status post LEEP 2017. In December 2020 follow-up with gynecology with new spotty vaginal bleeding/discharge with biopsy 12/7/20 showing squamous cell carcinoma consistent with cervical primary.  MRI pelvis performed showing locally advanced disease with 4.9 cm cervical mass with right parametrial involvement and enlarged right external iliac lymph node 1.5 x 1.2 cm. PET-CT showing avidity of cervical mass with extension to lower uterine segment, right vaginal focus of avidity and lymphadenopathy including right internal external iliac, periaortic, pericaval.  There is the new  hypermetabolic lymphadenopathy in right subcarinal 2.3 cm SUV 5.8. Small 9 mm right paratracheal S base slightly increased no FDG avid SUV 2.4.  01/29/2021 multidisciplinary tumor board discussion of her case with review of imaging; concern for new avid lymphadenopathy particularly in right subcarinal may be most consistent with metastatic cervical squamous cell carcinoma; tumor board recommendation for biopsy to confirm for prognostic information as well as given other concomitant malignancy to exclude alternative histology, although felt less likely metastatic breast given this is been well controlled and primarily bony disease involvement.  We discussed recommendation for systemic chemotherapy given advanced cervical squamous cell carcinoma with cisplatin, paclitaxel and bevacizumab with discontinuation of palbociclib but can continue aromatase inhibitor; taxane may also be active for her concomitant metastatic breast cancer.   - EBUS with biopsy 2/18/21, station 7 consistent with metastatic squamous cell carcinoma of cervical origin.  - STRATA requested on cervical SCC, ERBB3 <5% felt to be subclonal per report, PIK3CA amplification, WM5873, KDM6A, FRANK, TMB low, PDL1 high may indicated sensitivity to check point inhibitor in future regimen.  - audiology exam 2/18/21, recommended that she let us know if any perceived change in her hearing throughout treatment and recommend repeat testing throughout to ensure no ototoxicity.  - [facility] germline genetic testing returned negative for mutation, provided to patient.  - cycle 1 day 1 cisplatin paclitaxel and bevacizumab on 02/22/2021  Repeat scans 04/26/2021 showed excellent improvement in thoracic and pelvic lymphadenopathy and primary cervical mass.  She is asymptomatic from this no further bleeding.  Recommended continuation of her current regimen which she is tolerating well with supportive care, IV fluids 3 times weekly per patient preference.  - restaging after  cycle 6 she has continued improvement in metastatic cervical squamous cell carcinoma; avidity in left vulvar area diminished associated with known infection.  She has required substantial supportive care on chemotherapy and recommend given sustained improvement on scans continuing and absence of chemotherapy with bevacizumab alone and continue close surveillance.  She would like to continue IV fluids weekly as needed.    We reviewed in detail restaging PET-CT January 2022 notable for uptake in cervical region concerning for oligo progression/recurrence of her disease.  Previously biopsy proven metastatic cervical cancer in lungs without evidence of recurrent mass/lymphadenopathy/avidity in this region or otherwise.    Given prior PDL1 high disease CPS >1 we decided to proceed with chemoimmunotherapy per Keynote 826, carboplatin, paclitaxel and pembrolizumab.  She has had consultation with Dr. everett at [facility].  We discussed plan to initiate chemo immunotherapy for 3 cycles, restaging after this (tentative see PET scan 03/22/2022) peer; if no evidence of distant disease and persistent local cervical disease (oligo recurrent) it may be reasonable to proceed with interval brachytherapy before proceeding on cycles 4 through 6.      Tolerated cycle of 1 reasonably well no signs or symptoms of dehydration, nausea generally well controlled encouraged continued antiemetic use p.r.n..  Labs reviewed okay to proceed with cycle 3.  Will have her return in 3 weeks or earlier patient will get in touch as needed if supportive care necessary.      Plan for restaging PET after cycle 3, discussed with infusion nursing to please schedule.    Will need covering MD to review scan and get her back to Radiation Oncology Dr. Vázquez as appropriate before cycle 4.        # Hypothyroidism: Rising TSH and low free T4.  Recommend uptitration levothyroxine, will need close follow-up after some time  on higher dose    # neuropathy:  Mild, will monitor with re-initiation of chemotherapy per above.    # metastatic breast cancer ER positive HER2 negative:  Had been on systemic therapy with palbociclib and letrozole. While on systemic chemo due to c/f cytopenias/toxicity palbociclib is on hold. We have discussed this extensively at our visit. She is to continue on hormone lowering tx letrozole.       # bony metastatic disease:  on zoledronic acid dosed every 12 weeks. - Last zometa 1/18/22; next planned 4/12/22  DEXA 8/2021 without bone loss   Recent PET scan January 2022 without evidence of new bony metastatic disease.  She feels most comfortable with maintenance q.3 months which is reasonable.  Resolution of ENT/left ear infection.restarted zometa support.  Continue calcium vitamin-D supplementation.    # history of papillary thyroid carcinoma status post total thyroidectomy on 08/31/2017: s/p total thyroidectomy on levothyroxine.     # tobacco abuse:  Current tobacco use.   I have counseled for at least 3 min on the importance of tobacco cessation and discussed pharmacologic and therapy options to  aid in cessation.  She is interested in trial of bupropion prescribed to her local pharmacy.    Follow-Up:   Cycle 3 day 1 carboplatin paclitaxel and pembrolizumab tomorrow  Revisit 3 weeks for cycle 4 day 1 planned with CBC CMP TSH free T4 prior  restaging PET after cycle 3, RV after with covering MD and fup with radiation oncology Dr. Vázquez at [facility] prior to cycle 4, see above  Note: Last zometa 1/18/22; next planned 4/12/22

## 2022-03-10 NOTE — PROGRESS NOTES
Progress Note  Palliative Care      Consult Requested By: Dr. Asencio, oncology  Reason for Consult: pain and symptom management,   Understanding Illness, Treatment Decisions, Coping with Life-Limiting Illness and Advance Care Planning      ASSESSMENT/PLAN:     Plan/Recommendations:  Diagnoses and all orders for this visit:    Encounter for palliative care   -patient is decisional   -patient is accompanied by her partner of 16 years, Alessio Deluna   -ACP documents reviewed   -asked them to bring copy of HCPOA to next visit   -HCPOA:  David Acevedo, sister, at [phone]   -philosophy of palliative medicine reviewed with patient and family   -pt is DNR; LaPOST completed and uploaded in EMR     Neoplasm related pain   -fair control on current regimen   -she is taking her Norco 10/325mg every 4-5 hours, typically 5-6 tablets per day (needs #180 tablets)   -increase MS Contin 30mg PO BID to help cut back on short-acting frequency   -bowel regimen to prevent OIC   -some post-prandial abdominal pain ?IBS; considering GI referral    Metastatic squamous cell carcinoma of cervix   -followed by Dr. Asencio   -followed by gyn onc, Dr. Desouza (last visit 11/17/21)   -previously on palliative chemotherapy: cisplatin, paclitaxel and bevacizumab   -now on maintenance bevacizumab    -tolerating current treatments   -recent imaging stable    Metastatic breast cancer with bone mets   -followed by Dr. Asencio   -continues on Femara, recently restarted on palbociclib         Understanding of illness/Prognosis: good insight into multiple malignancies    Goals of care:  Life-prolonging, okay with hospice if treatment options become limited    Follow up: 3 months or prn      SUBJECTIVE:     History of Present Illness:  Patient is a 51 y.o. year old female presenting with for palliative follow up for her metastatic breast cancer, SCC of the cervix. Treatments currently on hold due to recurrent ear infection. She is anxious about results of her  recent scan and fearful of disease progression while off treatment.  Otherwise, her pain continues to be controlled on her current regimen.  No new symptoms or complaints today.    ONCOLOGY DIAGNOSES:  Stage IV cervical squamous cell carcinoma   stage IV, T2 N1b M1, invasive breast carcinoma, ER/MI+, HER2-   Papillary thyroid carcinoma status post total thyroidectomy on 08/31/2017, mpT1b N0    Cervical HSIL MIREILLE 3 s/p LEEP, 2017    01/06/2022  Patient looks and feels better compared to last visit.  She walked into her appt.  Ear is better and she is being followed by ENT.  She has resumed treatment for both her cervical and breast cancer and is followed closely by medical oncology and gyn oncology.  Pain continues to be controlled on current medication regimen.  She is having knee issues with swelling and likely OA.  We discussed referral to orthopedics.  She also plans to follow up with neurosurgery.  Overall, no major changes and no new issues.     Interval History  03/10/2022  Overall, patient seems to be doing okay.  She has some new GI issues with abdominal pain after eating and intermittent diarrhea not felt to be related to her disease or treatment.  Oncology discussed GI referral and she is considering this if no improvement.  We are adjusting her pain medication regimen today mostly to cut back on her prn Norco which she continues to use consistently at 5-6 tablets/day.  She knows she can reach out for additional questions or concerns.      NARRATIVE:       JEANETTE GANNON reviewed and summarized:           Past Medical History:   Diagnosis Date    Anxiety     Asthma     Breast cancer 2017    Cancer     right breast, metastatic-lymph nodes, bone, and thyroid     COPD (chronic obstructive pulmonary disease)     Depression     History of psychiatric hospitalization     2000; suicidal ideation    Hx of psychiatric care     Hypothyroidism     Miscarriage     five miscarriages    Obesity     Psychiatric  problem     Thyroid cancer 2017     Past Surgical History:   Procedure Laterality Date    ABSCESS DRAINAGE      bilateral axilla    ADENOIDECTOMY      APPENDECTOMY      BREAST BIOPSY Right     x3    CHOLECYSTECTOMY      ENDOBRONCHIAL ULTRASOUND Bilateral 2/18/2021    Procedure: ENDOBRONCHIAL ULTRASOUND (EBUS);  Surgeon: Jaron Ni MD;  Location: [facility] ENDO;  Service: Pulmonary;  Laterality: Bilateral;    FLUOROSCOPY N/A 1/28/2021    Procedure: Mediport placement;  Surgeon: Noah Phelan MD;  Location: [facility] CATH LAB;  Service: General;  Laterality: N/A;    MASS EXCISION      MEDIPORT INSERTION, SINGLE Right 02/2021    SKIN GRAFT  06/16/2017    THYROIDECTOMY Bilateral     TONSILLECTOMY       Family History   Problem Relation Age of Onset    Arthritis Mother     Early death Mother         64 at time of death    Heart attack Mother     Heart disease Mother     Heart failure Mother     Hypertension Mother     Coronary artery disease Father     Hearing loss Father     Heart disease Father     Hyperlipidemia Father     Hypertension Father     Mental illness Father     Learning disabilities Son     Cancer Maternal Aunt      Review of patient's allergies indicates:   Allergen Reactions    Gabapentin Swelling     Note: unilateral joint edema, unclear if due to gabapentin    Nsaids (non-steroidal anti-inflammatory drug) Other (See Comments)     Instructed not to take NSAID drugs with chemo pill.    Clindamycin Rash    Vancomycin analogues Itching       Medications:    Current Outpatient Medications:     albuterol (PROVENTIL) 2.5 mg /3 mL (0.083 %) nebulizer solution, Take 3 mLs (2.5 mg total) by nebulization every 4 to 6 hours as needed for Wheezing or Shortness of Breath., Disp: 360 mL, Rfl: 11    albuterol (PROVENTIL/VENTOLIN HFA) 90 mcg/actuation inhaler, Inhale 2 puffs into the lungs every 4 (four) hours as needed for Wheezing or Shortness of Breath., Disp: 18 g, Rfl: 11     ALPRAZolam (XANAX) 1 MG tablet, Take 0.5-1 tablets (0.5-1 mg total) by mouth 2 (two) times daily as needed for Anxiety., Disp: 60 tablet, Rfl: 0    buPROPion (WELLBUTRIN SR) 150 MG TBSR 12 hr tablet, Take 1 tablet (150 mg total) by mouth 2 (two) times daily. Take 1 tablet (150mg) once daily for 1 week then increase to twice daily, Disp: 60 tablet, Rfl: 0    calcium carbonate 400 mg calcium (1,000 mg) Chew, Take 2,000 mg by mouth once daily., Disp: , Rfl:     cyanocobalamin (VITAMIN B-12) 1000 MCG tablet, Take 1 tablet (1,000 mcg total) by mouth once daily., Disp: 90 tablet, Rfl: 3    dexAMETHasone (DECADRON) 4 MG Tab, Take 2 tablets (8 mg total) by mouth once daily. Take as directed on days 2, 3, and 4 of your chemotherapy cycle., Disp: 24 tablet, Rfl: 0    diphenoxylate-atropine 2.5-0.025 mg (LOMOTIL) 2.5-0.025 mg per tablet, Take 1 tablet by mouth 4 (four) times daily as needed for Diarrhea., Disp: 30 tablet, Rfl: 1    ergocalciferol (VITAMIN D2) 50,000 unit Cap, Take 5,000 Units by mouth once daily at 6am. , Disp: , Rfl:     HYDROcodone-acetaminophen (NORCO)  mg per tablet, Take 1 tablet by mouth every 4 (four) hours as needed for Pain. No more than 4 doses/ day, Disp: 180 tablet, Rfl: 0    hydrOXYzine HCL (ATARAX) 25 MG tablet, Take 1 tablet (25 mg total) by mouth 3 (three) times daily as needed for Itching., Disp: 90 tablet, Rfl: 1    ipratropium (ATROVENT) 42 mcg (0.06 %) nasal spray, 2 sprays by Nasal route 4 (four) times daily. As needed for rhinitis. Take in evening before bed and in the morning, Disp: 15 mL, Rfl: 11    letrozole (FEMARA) 2.5 mg Tab, Take 1 tablet (2.5 mg) by mouth once daily., Disp: 90 tablet, Rfl: 3    levothyroxine (EUTHYROX) 200 MCG tablet, Take 1 tablet (200 mcg total) by mouth once daily., Disp: 30 tablet, Rfl: 2    magnesium oxide (MAGOX) 400 mg (241.3 mg magnesium) tablet, Take 1 tablet (400 mg total) by mouth once daily., Disp: 5 tablet, Rfl: 1    morphine (MS  CONTIN) 15 MG 12 hr tablet, Take 1 tablet (15 mg total) by mouth every 12 (twelve) hours., Disp: 60 tablet, Rfl: 0    multivitamin (THERAGRAN) per tablet, Take 1 tablet by mouth once daily., Disp: , Rfl:     OLANZapine (ZYPREXA) 5 MG tablet, Take 1 tablet (5 mg total) by mouth nightly. Take on evenings of chemotherapy days 1, 2, 3 and 4, Disp: 12 tablet, Rfl: 2    OLANZapine (ZYPREXA) 5 MG tablet, Take 1 tablet (5 mg total) by mouth every evening. Take as directed on days 1-4 of your chemotherapy cycle. May increase to 10 mg if breakthrough nausea occurs., Disp: 30 tablet, Rfl: 2    ondansetron (ZOFRAN-ODT) 8 MG TbDL, Take 1 tablet (8 mg total) by mouth every 8 (eight) hours as needed (nausea/vomiting)., Disp: 60 tablet, Rfl: 5    palbociclib (IBRANCE) 125 mg Cap, Take one capsule (125 mg) by mouth once daily on days 1-21 of each 28-day cycle., Disp: 21 capsule, Rfl: 11    potassium chloride SA (K-DUR,KLOR-CON) 20 MEQ tablet, Take 1 tablet (20 mEq total) by mouth once daily. Take daily for 3 days., Disp: 3 tablet, Rfl: 1    prochlorperazine (COMPAZINE) 5 MG tablet, Take 1 tablet (5 mg total) by mouth every 6 (six) hours as needed for Nausea., Disp: 30 tablet, Rfl: 1    OBJECTIVE:       ROS:  Review of Systems   Constitutional: Positive for activity change and fatigue.   HENT: Negative.    Eyes: Negative.    Respiratory: Negative for shortness of breath.    Gastrointestinal: Positive for abdominal pain and diarrhea (sometimes). Negative for vomiting.   Endocrine: Negative.    Genitourinary: Negative.    Musculoskeletal: Positive for arthralgias, back pain, gait problem and joint swelling.   Skin: Negative.    Allergic/Immunologic: Positive for immunocompromised state.   Neurological: Positive for weakness. Negative for dizziness and headaches.   Psychiatric/Behavioral: Positive for sleep disturbance. Negative for confusion and decreased concentration. The patient is nervous/anxious.        Review of  Symptoms    Symptom Assessment (ESAS 0-10 Scale)  Pain:  8  Dyspnea:  0  Anxiety:  0  Nausea:  0  Depression:  0  Anorexia:  0  Fatigue:  8  Insomnia:  7  Restlessness:  7  Agitation:  0     CAM / Delirium:  Negative  Constipation:  Negative  Diarrhea:  Negative    Bowel Management Plan (BMP):  Yes      Location Choices: back and knees.      ECOG Performance Status stGstrstastdstest:st st1st Living Arrangements:  Lives with friend and Lives in nursing home    Psychosocial/Cultural: Lives with her significant other of 16 years: they have 3 children combined, 2 sons from previous marriage      Advance Care Planning   Advance Directives:   Living Will: Yes        Copy on chart: Yes    LaPOST: Yes    Do Not Resuscitate Status: Yes      Decision Making:  Patient answered questions              Physical Exam:  Vitals: Temp: 97.8 °F (36.6 °C) (03/10/22 1156)  Pulse: 110 (03/10/22 1156)  Resp: 16 (03/10/22 1156)  BP: (!) 138/92 (03/10/22 1156)  SpO2: 100 % (03/10/22 1156)  Physical Exam  Constitutional:       General: She is not in acute distress.     Appearance: Normal appearance. She is ill-appearing (chronically ).      Comments: Seating in wheelchair   HENT:      Head: Normocephalic and atraumatic.      Nose: No congestion.      Mouth/Throat:      Mouth: Mucous membranes are moist.      Pharynx: Oropharynx is clear.   Eyes:      Extraocular Movements: Extraocular movements intact.      Pupils: Pupils are equal, round, and reactive to light.   Cardiovascular:      Rate and Rhythm: Normal rate.   Pulmonary:      Effort: Pulmonary effort is normal.   Abdominal:      General: There is no distension.      Palpations: Abdomen is soft.   Musculoskeletal:         General: Swelling (mild to left knee) and tenderness present.      Cervical back: Neck supple.   Skin:     General: Skin is warm and dry.   Neurological:      Mental Status: She is alert and oriented to person, place, and time.      Motor: Weakness present.      Gait: Gait abnormal.    Psychiatric:         Mood and Affect: Mood normal.         Behavior: Behavior normal.         Thought Content: Thought content normal.         Judgment: Judgment normal.         Labs:  CBC:   WBC   Date Value Ref Range Status   03/10/2022 9.08 3.90 - 12.70 K/uL Final     Hemoglobin   Date Value Ref Range Status   03/10/2022 11.8 (L) 12.0 - 16.0 g/dL Final     Hematocrit   Date Value Ref Range Status   03/10/2022 36.5 (L) 37.0 - 48.5 % Final     MCV   Date Value Ref Range Status   03/10/2022 106 (H) 82 - 98 fL Final     Platelets   Date Value Ref Range Status   03/10/2022 262 150 - 450 K/uL Final       LFT:   Lab Results   Component Value Date    AST 17 03/10/2022    ALKPHOS 75 03/10/2022    BILITOT 0.4 03/10/2022       Albumin:   Albumin   Date Value Ref Range Status   03/10/2022 4.0 3.5 - 5.2 g/dL Final     Protein:   Total Protein   Date Value Ref Range Status   03/10/2022 7.2 6.0 - 8.4 g/dL Final       Radiology: CT of abdomen/pelvis 02/26/2022  FINDINGS:  Heart: Normal in size. No pericardial effusion.   Lung Bases: Well aerated, without consolidation or pleural fluid.   Liver: Normal in size and attenuation, with no focal hepatic lesions.   Gallbladder: Surgically absent.   Bile Ducts: No evidence of dilated ducts.   Pancreas: No mass or peripancreatic fat stranding.   Spleen: Prior granulomatous disease.   Adrenals: Unremarkable.   Kidneys/ Ureters: No obstructive uropathy.  No hydronephrosis.  No nonobstructive nephrolithiasis.  Left renal simple renal cyst.   Bladder: No evidence of wall thickening.   Reproductive organs: Known cervical mass is not well delineated on today's exam.  Reproductive organs otherwise unremarkable.   GI Tract/Mesentery: No evidence of bowel obstruction or inflammation.   Peritoneal Space: No ascites. No free air.   Retroperitoneum: No significant adenopathy.   Abdominal wall: Unremarkable.   Vasculature: Moderate aortoiliac atherosclerosis.  No aneurysm.   Bones: No acute  fracture.  L5-S1 degenerative change.   Impression:   No definite acute abnormality.   Additional details as above.      42 minutes of total time spent on the encounter, which includes face to face time and non-face to face time preparing to see the patient (eg, review of tests), Obtaining and/or reviewing separately obtained history, Documenting clinical information in the electronic or other health record, Independently interpreting results (not separately reported) and communicating results to the patient/family/caregiver, or Care coordination (not separately reported).    Signature: Telma Kumar NP

## 2022-03-11 ENCOUNTER — TELEPHONE (OUTPATIENT)
Dept: HEMATOLOGY/ONCOLOGY | Facility: CLINIC | Age: 52
End: 2022-03-11
Payer: MEDICAID

## 2022-03-11 ENCOUNTER — INFUSION (OUTPATIENT)
Dept: INFUSION THERAPY | Facility: HOSPITAL | Age: 52
End: 2022-03-11
Attending: INTERNAL MEDICINE
Payer: MEDICAID

## 2022-03-11 VITALS
HEART RATE: 87 BPM | DIASTOLIC BLOOD PRESSURE: 77 MMHG | OXYGEN SATURATION: 99 % | SYSTOLIC BLOOD PRESSURE: 115 MMHG | RESPIRATION RATE: 18 BRPM | TEMPERATURE: 98 F

## 2022-03-11 DIAGNOSIS — C53.0 MALIGNANT NEOPLASM OF ENDOCERVIX: Primary | ICD-10-CM

## 2022-03-11 PROCEDURE — 63600175 PHARM REV CODE 636 W HCPCS: Performed by: INTERNAL MEDICINE

## 2022-03-11 PROCEDURE — 96413 CHEMO IV INFUSION 1 HR: CPT

## 2022-03-11 PROCEDURE — 96367 TX/PROPH/DG ADDL SEQ IV INF: CPT

## 2022-03-11 PROCEDURE — 96415 CHEMO IV INFUSION ADDL HR: CPT

## 2022-03-11 PROCEDURE — 25000003 PHARM REV CODE 250: Performed by: INTERNAL MEDICINE

## 2022-03-11 PROCEDURE — 96375 TX/PRO/DX INJ NEW DRUG ADDON: CPT

## 2022-03-11 PROCEDURE — 96417 CHEMO IV INFUS EACH ADDL SEQ: CPT

## 2022-03-11 RX ORDER — SODIUM CHLORIDE 0.9 % (FLUSH) 0.9 %
10 SYRINGE (ML) INJECTION
Status: DISCONTINUED | OUTPATIENT
Start: 2022-03-11 | End: 2022-03-11 | Stop reason: HOSPADM

## 2022-03-11 RX ORDER — FAMOTIDINE 10 MG/ML
20 INJECTION INTRAVENOUS
Status: COMPLETED | OUTPATIENT
Start: 2022-03-11 | End: 2022-03-11

## 2022-03-11 RX ORDER — HEPARIN 100 UNIT/ML
500 SYRINGE INTRAVENOUS
Status: DISCONTINUED | OUTPATIENT
Start: 2022-03-11 | End: 2022-03-11 | Stop reason: HOSPADM

## 2022-03-11 RX ADMIN — PACLITAXEL 366 MG: 6 INJECTION, SOLUTION INTRAVENOUS at 12:03

## 2022-03-11 RX ADMIN — DIPHENHYDRAMINE HYDROCHLORIDE 50 MG: 50 INJECTION INTRAMUSCULAR; INTRAVENOUS at 12:03

## 2022-03-11 RX ADMIN — FAMOTIDINE 20 MG: 10 INJECTION, SOLUTION INTRAVENOUS at 11:03

## 2022-03-11 RX ADMIN — CARBOPLATIN 525 MG: 10 INJECTION, SOLUTION INTRAVENOUS at 04:03

## 2022-03-11 RX ADMIN — SODIUM CHLORIDE: 0.9 INJECTION, SOLUTION INTRAVENOUS at 11:03

## 2022-03-11 RX ADMIN — PALONOSETRON HYDROCHLORIDE 0.25 MG: 0.25 INJECTION, SOLUTION INTRAVENOUS at 11:03

## 2022-03-11 RX ADMIN — APREPITANT 130 MG: 130 INJECTION, EMULSION INTRAVENOUS at 11:03

## 2022-03-11 RX ADMIN — Medication 500 UNITS: at 04:03

## 2022-03-11 RX ADMIN — SODIUM CHLORIDE 200 MG: 9 INJECTION, SOLUTION INTRAVENOUS at 11:03

## 2022-03-15 ENCOUNTER — PATIENT MESSAGE (OUTPATIENT)
Dept: HEMATOLOGY/ONCOLOGY | Facility: CLINIC | Age: 52
End: 2022-03-15
Payer: MEDICAID

## 2022-03-15 DIAGNOSIS — R94.6 THYROID FUNCTION STUDY ABNORMALITY: Primary | ICD-10-CM

## 2022-03-16 RX ORDER — LEVOTHYROXINE SODIUM 50 UG/1
50 TABLET ORAL DAILY
Qty: 30 TABLET | Refills: 1 | Status: SHIPPED | OUTPATIENT
Start: 2022-03-16 | End: 2022-05-12

## 2022-03-19 ENCOUNTER — HOSPITAL ENCOUNTER (EMERGENCY)
Facility: HOSPITAL | Age: 52
Discharge: HOME | End: 2022-03-19
Attending: EMERGENCY MEDICINE
Payer: MEDICAID

## 2022-03-19 VITALS
OXYGEN SATURATION: 98 % | DIASTOLIC BLOOD PRESSURE: 79 MMHG | TEMPERATURE: 98 F | RESPIRATION RATE: 18 BRPM | HEART RATE: 84 BPM | SYSTOLIC BLOOD PRESSURE: 128 MMHG

## 2022-03-19 DIAGNOSIS — N30.00 ACUTE CYSTITIS WITHOUT HEMATURIA: Primary | ICD-10-CM

## 2022-03-19 LAB
ALBUMIN SERPL BCP-MCNC: 3.7 G/DL (ref 3.5–5.2)
ALP SERPL-CCNC: 69 U/L (ref 55–135)
ALT SERPL W/O P-5'-P-CCNC: 19 U/L (ref 10–44)
ANION GAP SERPL CALC-SCNC: 9 MMOL/L (ref 8–16)
ANISOCYTOSIS BLD QL SMEAR: SLIGHT
AST SERPL-CCNC: 16 U/L (ref 10–40)
BACTERIA #/AREA URNS HPF: ABNORMAL /HPF
BASOPHILS # BLD AUTO: 0.01 K/UL (ref 0–0.2)
BASOPHILS NFR BLD: 0.3 % (ref 0–1.9)
BILIRUB SERPL-MCNC: 0.4 MG/DL (ref 0.1–1)
BILIRUB UR QL STRIP: NEGATIVE
BUN SERPL-MCNC: 17 MG/DL (ref 6–20)
CALCIUM SERPL-MCNC: 8.6 MG/DL (ref 8.7–10.5)
CHLORIDE SERPL-SCNC: 103 MMOL/L (ref 95–110)
CLARITY UR: CLEAR
CO2 SERPL-SCNC: 25 MMOL/L (ref 23–29)
COLOR UR: YELLOW
CREAT SERPL-MCNC: 1.1 MG/DL (ref 0.5–1.4)
DACRYOCYTES BLD QL SMEAR: ABNORMAL
DIFFERENTIAL METHOD: ABNORMAL
EOSINOPHIL # BLD AUTO: 0.1 K/UL (ref 0–0.5)
EOSINOPHIL NFR BLD: 3.3 % (ref 0–8)
ERYTHROCYTE [DISTWIDTH] IN BLOOD BY AUTOMATED COUNT: 15.9 % (ref 11.5–14.5)
EST. GFR  (AFRICAN AMERICAN): >60 ML/MIN/1.73 M^2
EST. GFR  (NON AFRICAN AMERICAN): 57 ML/MIN/1.73 M^2
GLUCOSE SERPL-MCNC: 114 MG/DL (ref 70–110)
GLUCOSE UR QL STRIP: NEGATIVE
HCT VFR BLD AUTO: 28 % (ref 37–48.5)
HGB BLD-MCNC: 9.3 G/DL (ref 12–16)
HGB UR QL STRIP: NEGATIVE
HYALINE CASTS #/AREA URNS LPF: 0 /LPF
IMM GRANULOCYTES # BLD AUTO: 0.03 K/UL (ref 0–0.04)
IMM GRANULOCYTES NFR BLD AUTO: 0.8 % (ref 0–0.5)
KETONES UR QL STRIP: NEGATIVE
LACTATE SERPL-SCNC: 1.7 MMOL/L (ref 0.5–2.2)
LEUKOCYTE ESTERASE UR QL STRIP: ABNORMAL
LIPASE SERPL-CCNC: 41 U/L (ref 4–60)
LYMPHOCYTES # BLD AUTO: 1.7 K/UL (ref 1–4.8)
LYMPHOCYTES NFR BLD: 46.7 % (ref 18–48)
MCH RBC QN AUTO: 34.1 PG (ref 27–31)
MCHC RBC AUTO-ENTMCNC: 33.2 G/DL (ref 32–36)
MCV RBC AUTO: 103 FL (ref 82–98)
MICROSCOPIC COMMENT: ABNORMAL
MONOCYTES # BLD AUTO: 0.4 K/UL (ref 0.3–1)
MONOCYTES NFR BLD: 10.2 % (ref 4–15)
NEUTROPHILS # BLD AUTO: 1.4 K/UL (ref 1.8–7.7)
NEUTROPHILS NFR BLD: 38.7 % (ref 38–73)
NITRITE UR QL STRIP: NEGATIVE
NRBC BLD-RTO: 1 /100 WBC
OVALOCYTES BLD QL SMEAR: ABNORMAL
PH UR STRIP: 5 [PH] (ref 5–8)
PLATELET # BLD AUTO: 145 K/UL (ref 150–450)
PLATELET BLD QL SMEAR: ABNORMAL
PMV BLD AUTO: 11.8 FL (ref 9.2–12.9)
POIKILOCYTOSIS BLD QL SMEAR: SLIGHT
POTASSIUM SERPL-SCNC: 3.6 MMOL/L (ref 3.5–5.1)
PROT SERPL-MCNC: 6.5 G/DL (ref 6–8.4)
PROT UR QL STRIP: ABNORMAL
RBC # BLD AUTO: 2.73 M/UL (ref 4–5.4)
RBC #/AREA URNS HPF: 0 /HPF (ref 0–4)
SODIUM SERPL-SCNC: 137 MMOL/L (ref 136–145)
SP GR UR STRIP: >=1.03 (ref 1–1.03)
URN SPEC COLLECT METH UR: ABNORMAL
UROBILINOGEN UR STRIP-ACNC: NEGATIVE EU/DL
WBC # BLD AUTO: 3.62 K/UL (ref 3.9–12.7)
WBC #/AREA URNS HPF: 8 /HPF (ref 0–5)

## 2022-03-19 PROCEDURE — 99284 EMERGENCY DEPT VISIT MOD MDM: CPT | Mod: 25

## 2022-03-19 PROCEDURE — 25000003 PHARM REV CODE 250: Performed by: EMERGENCY MEDICINE

## 2022-03-19 PROCEDURE — 80053 COMPREHEN METABOLIC PANEL: CPT | Performed by: NURSE PRACTITIONER

## 2022-03-19 PROCEDURE — 51798 US URINE CAPACITY MEASURE: CPT

## 2022-03-19 PROCEDURE — 81000 URINALYSIS NONAUTO W/SCOPE: CPT | Performed by: NURSE PRACTITIONER

## 2022-03-19 PROCEDURE — 63600175 PHARM REV CODE 636 W HCPCS: Performed by: EMERGENCY MEDICINE

## 2022-03-19 PROCEDURE — 85025 COMPLETE CBC W/AUTO DIFF WBC: CPT | Performed by: NURSE PRACTITIONER

## 2022-03-19 PROCEDURE — 96374 THER/PROPH/DIAG INJ IV PUSH: CPT | Mod: 59

## 2022-03-19 PROCEDURE — 83690 ASSAY OF LIPASE: CPT | Performed by: NURSE PRACTITIONER

## 2022-03-19 PROCEDURE — 83605 ASSAY OF LACTIC ACID: CPT | Performed by: NURSE PRACTITIONER

## 2022-03-19 RX ORDER — CEFDINIR 300 MG/1
300 CAPSULE ORAL 2 TIMES DAILY
Qty: 14 CAPSULE | Refills: 0 | Status: SHIPPED | OUTPATIENT
Start: 2022-03-19 | End: 2022-03-26

## 2022-03-19 RX ORDER — MORPHINE SULFATE 4 MG/ML
6 INJECTION, SOLUTION INTRAMUSCULAR; INTRAVENOUS
Status: COMPLETED | OUTPATIENT
Start: 2022-03-19 | End: 2022-03-19

## 2022-03-19 RX ORDER — PHENAZOPYRIDINE HYDROCHLORIDE 200 MG/1
200 TABLET, FILM COATED ORAL 3 TIMES DAILY
Qty: 6 TABLET | Refills: 0 | Status: SHIPPED | OUTPATIENT
Start: 2022-03-19 | End: 2022-03-21

## 2022-03-19 RX ORDER — CEFDINIR 300 MG/1
300 CAPSULE ORAL
Status: COMPLETED | OUTPATIENT
Start: 2022-03-19 | End: 2022-03-19

## 2022-03-19 RX ORDER — HEPARIN 100 UNIT/ML
5 SYRINGE INTRAVENOUS
Status: COMPLETED | OUTPATIENT
Start: 2022-03-19 | End: 2022-03-19

## 2022-03-19 RX ADMIN — HEPARIN 500 UNITS: 100 SYRINGE at 11:03

## 2022-03-19 RX ADMIN — MORPHINE SULFATE 6 MG: 4 INJECTION INTRAVENOUS at 07:03

## 2022-03-19 RX ADMIN — CEFDINIR 300 MG: 300 CAPSULE ORAL at 11:03

## 2022-03-19 NOTE — FIRST PROVIDER EVALUATION
Medical screening exam completed.  I have conducted a focused provider triage encounter, findings are as follows:    Brief history of present illness:  Patient presents to ER for abdominal pain, onset 3 days ago.  She denies associated symptoms.      Vitals:    03/19/22 1703   BP: 117/70   BP Location: Right arm   Patient Position: Sitting   Pulse: 99   Resp: 20   Temp: 97.9 °F (36.6 °C)   TempSrc: Oral   SpO2: 98%   Weight: Comment: unable to stand       Pertinent physical exam:  Lower abdominal pain, Patient AAOx3, respirations even, unlabored.    Brief workup plan:  Labs, U/A    Preliminary workup initiated; this workup will be continued and followed by the physician or advanced practice provider that is assigned to the patient when roomed.

## 2022-03-20 NOTE — ED PROVIDER NOTES
SCRIBE #1 NOTE: IVince am scribing for, and in the presence of, Arturo Rodriguez MD. I have scribed the entire note.   SCRIBE #1 NOTE: Vince MONTES am scribing for, and in the presence of,  Dr. Jennifer MD. I have scribed the following portions of the note - Other sections scribed: HPI/ROS/PEx.   SCRIBE #2 NOTE: Rula MONTES am scribing for, and in the presence of,  Dr. Tobin MD. I have scribed the remaining portions of the note not scribed by Scribe #1.      History     Chief Complaint   Patient presents with    Abdominal Pain     Dysuria x4 days, chemo pt, c/o dry mouth, weakness, and decreased urine output     Review of patient's allergies indicates:   Allergen Reactions    Gabapentin Swelling     Note: unilateral joint edema, unclear if due to gabapentin    Nsaids (non-steroidal anti-inflammatory drug) Other (See Comments)     Instructed not to take NSAID drugs with chemo pill.    Clindamycin Rash    Vancomycin analogues Itching         History of Present Illness     HPI    3/19/2022, 7:24 PM  History obtained from the patient      History of Present Illness: Josey Flores is a 51 y.o. female patient with a PMHx of Breast, Metastatic-lymph node, bone, and thyroid cancer  who presents to the Emergency Department for evaluation of abd pain which onset gradually x4 days. Pt reports that she has not been urinating for at least three days. She states that she feels like her bladder is full but it is difficult to drain. Pt also has been doing chemo therapy for 6 months but was just recently discontinued on chemotherapy due to reaction with thyroid. When pt first starting experiencing difficulty urinating, she first experienced dysuria. Symptoms are constant and moderate in severity. No mitigating or exacerbating factors reported. Patient denies any fever, cough, chills, CP, back pain, and all other sxs at this time. No prior tx. No further complaints or concerns at this time.        Arrival mode: Personal vehicle      PCP: Kishore Persaud MD        Past Medical History:  Past Medical History:   Diagnosis Date    Anxiety     Asthma     Breast cancer 2017    Cancer     right breast, metastatic-lymph nodes, bone, and thyroid     COPD (chronic obstructive pulmonary disease)     Depression     History of psychiatric hospitalization     2000; suicidal ideation    Hx of psychiatric care     Hypothyroidism     Miscarriage     five miscarriages    Obesity     Psychiatric problem     Thyroid cancer 2017       Past Surgical History:  Past Surgical History:   Procedure Laterality Date    ABSCESS DRAINAGE      bilateral axilla    ADENOIDECTOMY      APPENDECTOMY      BREAST BIOPSY Right     x3    CHOLECYSTECTOMY      ENDOBRONCHIAL ULTRASOUND Bilateral 2/18/2021    Procedure: ENDOBRONCHIAL ULTRASOUND (EBUS);  Surgeon: Jaron Ni MD;  Location: [facility] ENDO;  Service: Pulmonary;  Laterality: Bilateral;    FLUOROSCOPY N/A 1/28/2021    Procedure: Mediport placement;  Surgeon: Noah Phelan MD;  Location: [facility] CATH LAB;  Service: General;  Laterality: N/A;    MASS EXCISION      MEDIPORT INSERTION, SINGLE Right 02/2021    SKIN GRAFT  06/16/2017    THYROIDECTOMY Bilateral     TONSILLECTOMY           Family History:  Family History   Problem Relation Age of Onset    Arthritis Mother     Early death Mother         64 at time of death    Heart attack Mother     Heart disease Mother     Heart failure Mother     Hypertension Mother     Coronary artery disease Father     Hearing loss Father     Heart disease Father     Hyperlipidemia Father     Hypertension Father     Mental illness Father     Learning disabilities Son     Cancer Maternal Aunt        Social History:  Social History     Tobacco Use    Smoking status: Current Every Day Smoker     Packs/day: 1.00     Years: 32.00     Pack years: 32.00     Types: Cigarettes     Start date: 1/1/1985     Last attempt to  quit: 2017     Years since quittin.7    Smokeless tobacco: Never Used    Tobacco comment: 45 pack year history   Substance and Sexual Activity    Alcohol use: No    Drug use: No    Sexual activity: Yes     Partners: Male        Review of Systems     Review of Systems   Constitutional: Negative for chills and fever.   HENT: Negative for sore throat.    Respiratory: Negative for cough and shortness of breath.    Cardiovascular: Negative for chest pain.   Gastrointestinal: Positive for abdominal pain. Negative for diarrhea and nausea.   Genitourinary: Positive for decreased urine volume and dysuria.   Musculoskeletal: Negative for back pain.   Skin: Negative for rash.   Neurological: Negative for weakness.   Hematological: Does not bruise/bleed easily.   All other systems reviewed and are negative.     Physical Exam     Initial Vitals [22 1703]   BP Pulse Resp Temp SpO2   117/70 99 20 97.9 °F (36.6 °C) 98 %      MAP       --          Physical Exam  Nursing Notes and Vital Signs Reviewed.  Constitutional: Patient is in mild distress due to pain. Well-developed and well-nourished.  Head: Atraumatic. Normocephalic.  Eyes: EOM intact. Conjunctivae are not pale. No scleral icterus.  ENT: Mucous membranes are moist. Oropharynx is clear and symmetric.    Neck: Supple. Full ROM.   Cardiovascular: Regular rate. Regular rhythm. No murmurs, rubs, or gallops. Distal pulses are 2+ and symmetric.  Pulmonary/Chest: No respiratory distress.  Abdominal: Soft and non-distended.  Supra pubic tenderness.  No rebound, guarding, or rigidity.   Musculoskeletal: Moves all extremities. No obvious deformities. No edema. Right chest wall pain.   Skin: Warm and dry.  Neurological:  Alert, awake, and appropriate.  Normal speech.  No acute focal neurological deficits are appreciated.  Psychiatric: Normal affect. Good eye contact. Appropriate in content.     ED Course   Procedures  ED Vital Signs:  Vitals:    22 1703  03/19/22 1952 03/19/22 2002 03/19/22 2033   BP: 117/70  (!) 143/67 133/63   Pulse: 99  78 79   Resp: 20 18 18    Temp: 97.9 °F (36.6 °C)      TempSrc: Oral      SpO2: 98%  97% 97%    03/19/22 2103 03/19/22 2319 03/19/22 2320   BP: 117/62  128/79   Pulse: 83 84    Resp:      Temp:      TempSrc:      SpO2: 99% 98%        Abnormal Lab Results:  Labs Reviewed   CBC W/ AUTO DIFFERENTIAL - Abnormal; Notable for the following components:       Result Value    WBC 3.62 (*)     RBC 2.73 (*)     Hemoglobin 9.3 (*)     Hematocrit 28.0 (*)      (*)     MCH 34.1 (*)     RDW 15.9 (*)     Platelets 145 (*)     Immature Granulocytes 0.8 (*)     Gran # (ANC) 1.4 (*)     nRBC 1 (*)     Platelet Estimate Decreased (*)     All other components within normal limits   COMPREHENSIVE METABOLIC PANEL - Abnormal; Notable for the following components:    Glucose 114 (*)     Calcium 8.6 (*)     eGFR if non  57 (*)     All other components within normal limits   URINALYSIS, REFLEX TO URINE CULTURE - Abnormal; Notable for the following components:    Specific Gravity, UA >=1.030 (*)     Protein, UA 1+ (*)     Leukocytes, UA Trace (*)     All other components within normal limits    Narrative:     Specimen Source->Urine   URINALYSIS MICROSCOPIC - Abnormal; Notable for the following components:    WBC, UA 8 (*)     All other components within normal limits    Narrative:     Specimen Source->Urine   LIPASE   LACTIC ACID, PLASMA        All Lab Results:  Results for orders placed or performed during the hospital encounter of 03/19/22   CBC auto differential   Result Value Ref Range    WBC 3.62 (L) 3.90 - 12.70 K/uL    RBC 2.73 (L) 4.00 - 5.40 M/uL    Hemoglobin 9.3 (L) 12.0 - 16.0 g/dL    Hematocrit 28.0 (L) 37.0 - 48.5 %     (H) 82 - 98 fL    MCH 34.1 (H) 27.0 - 31.0 pg    MCHC 33.2 32.0 - 36.0 g/dL    RDW 15.9 (H) 11.5 - 14.5 %    Platelets 145 (L) 150 - 450 K/uL    MPV 11.8 9.2 - 12.9 fL    Immature Granulocytes 0.8  (H) 0.0 - 0.5 %    Gran # (ANC) 1.4 (L) 1.8 - 7.7 K/uL    Immature Grans (Abs) 0.03 0.00 - 0.04 K/uL    Lymph # 1.7 1.0 - 4.8 K/uL    Mono # 0.4 0.3 - 1.0 K/uL    Eos # 0.1 0.0 - 0.5 K/uL    Baso # 0.01 0.00 - 0.20 K/uL    nRBC 1 (A) 0 /100 WBC    Gran % 38.7 38.0 - 73.0 %    Lymph % 46.7 18.0 - 48.0 %    Mono % 10.2 4.0 - 15.0 %    Eosinophil % 3.3 0.0 - 8.0 %    Basophil % 0.3 0.0 - 1.9 %    Platelet Estimate Decreased (A)     Aniso Slight     Poik Slight     Ovalocytes Occasional     Tear Drop Cells Occasional     Differential Method Automated    Comprehensive metabolic panel   Result Value Ref Range    Sodium 137 136 - 145 mmol/L    Potassium 3.6 3.5 - 5.1 mmol/L    Chloride 103 95 - 110 mmol/L    CO2 25 23 - 29 mmol/L    Glucose 114 (H) 70 - 110 mg/dL    BUN 17 6 - 20 mg/dL    Creatinine 1.1 0.5 - 1.4 mg/dL    Calcium 8.6 (L) 8.7 - 10.5 mg/dL    Total Protein 6.5 6.0 - 8.4 g/dL    Albumin 3.7 3.5 - 5.2 g/dL    Total Bilirubin 0.4 0.1 - 1.0 mg/dL    Alkaline Phosphatase 69 55 - 135 U/L    AST 16 10 - 40 U/L    ALT 19 10 - 44 U/L    Anion Gap 9 8 - 16 mmol/L    eGFR if African American >60 >60 mL/min/1.73 m^2    eGFR if non African American 57 (A) >60 mL/min/1.73 m^2   Lipase   Result Value Ref Range    Lipase 41 4 - 60 U/L   Lactic acid, plasma   Result Value Ref Range    Lactate (Lactic Acid) 1.7 0.5 - 2.2 mmol/L   Urinalysis, Reflex to Urine Culture Urine, Clean Catch    Specimen: Urine   Result Value Ref Range    Specimen UA Urine, Clean Catch     Color, UA Yellow Yellow, Straw, [place]    Appearance, UA Clear Clear    pH, UA 5.0 5.0 - 8.0    Specific Gravity, UA >=1.030 (A) 1.005 - 1.030    Protein, UA 1+ (A) Negative    Glucose, UA Negative Negative    Ketones, UA Negative Negative    Bilirubin (UA) Negative Negative    Occult Blood UA Negative Negative    Nitrite, UA Negative Negative    Urobilinogen, UA Negative <2.0 EU/dL    Leukocytes, UA Trace (A) Negative   Urinalysis Microscopic   Result Value Ref  Range    RBC, UA 0 0 - 4 /hpf    WBC, UA 8 (H) 0 - 5 /hpf    Bacteria Occasional None-Occ /hpf    Hyaline Casts, UA 0 0-1/lpf /lpf    Microscopic Comment SEE COMMENT      *Note: Due to a large number of results and/or encounters for the requested time period, some results have not been displayed. A complete set of results can be found in Results Review.         Imaging Results:  Imaging Results    None               The Emergency Provider reviewed the vital signs and test results, which are outlined above.     ED Discussion   8:02 PM: Dr. Jennifer MD transfers care of patient to Dr. Tobin MD pending lab results.    10:55 PM: Reassessed pt at this time. Discussed with pt all pertinent ED information and results. Discussed pt dx and plan of tx. Gave pt all f/u and return to the ED instructions. All questions and concerns were addressed at this time. Pt expresses understanding of information and instructions, and is comfortable with plan to discharge. Pt is stable for discharge.    I discussed with patient and/or family/caretaker that evaluation in the ED does not suggest any emergent or life threatening medical conditions requiring immediate intervention beyond what was provided in the ED, and I believe patient is safe for discharge.  Regardless, an unremarkable evaluation in the ED does not preclude the development or presence of a serious of life threatening condition. As such, patient was instructed to return immediately for any worsening or change in current symptoms.           Medical Decision Making:   Clinical Tests:   Lab Tests: Ordered and Reviewed           ED Medication(s):  Medications   morphine injection 6 mg (6 mg Intravenous Given 3/19/22 1952)   cefdinir capsule 300 mg (300 mg Oral Given 3/19/22 2316)   heparin, porcine (PF) 100 unit/mL injection flush 500 Units (500 Units Intravenous Given 3/19/22 2316)       Discharge Medication List as of 3/19/2022 10:59 PM      START taking these medications     Details   cefdinir (OMNICEF) 300 MG capsule Take 1 capsule (300 mg total) by mouth 2 (two) times daily. for 7 days, Starting Sat 3/19/2022, Until Sat 3/26/2022, Print      phenazopyridine (PYRIDIUM) 200 MG tablet Take 1 tablet (200 mg total) by mouth 3 (three) times daily. for 2 days, Starting Sat 3/19/2022, Until Mon 3/21/2022, Print              Follow-up Information     Kishore Persaud MD. Schedule an appointment as soon as possible for a visit in 1 week.    Specialty: Family Medicine  Contact information:  [address] [facility] [address]  [phone]             [facility] - Emergency Dept..    Specialty: Emergency Medicine  Why: As needed, If symptoms worsen  Contact information:  [address] [facility] [address] [phone]                           Scribe Attestation:   Scribe #1: I performed the above scribed service and the documentation accurately describes the services I performed. I attest to the accuracy of the note.     Attending:   Physician Attestation Statement for Scribe #1: I, Arturo Rodriguez MD, personally performed the services described in this documentation, as scribed by Vince Vasquez, in my presence, and it is both accurate and complete.       Scribe Attestation:   Scribe #2: I performed the above scribed service and the documentation accurately describes the services I performed. I attest to the accuracy of the note.    Attending Attestation:           Physician Attestation for Scribe:  Physician Attestation Statement for Scribe #1: I, Dr. Jennifer MD, reviewed documentation, as scribed by Vince Vasquez in my presence, and it is both accurate and complete.   Physician Attestation Statement for Scribe #2: I, Dr. Tobin MD, reviewed documentation, as scribed by Rula Hutchison in my presence, and it is both accurate and complete. I also acknowledge and confirm the content of the note done by Brennanibe #1.           Clinical Impression       ICD-10-CM  ICD-9-CM   1. Acute cystitis without hematuria  N30.00 595.0       Disposition:   Disposition: Discharged  Condition: Stable         Charlie Rudd MD  03/20/22 0007

## 2022-03-25 ENCOUNTER — HOSPITAL ENCOUNTER (OUTPATIENT)
Dept: RADIOLOGY | Facility: HOSPITAL | Age: 52
Discharge: HOME | End: 2022-03-25
Attending: INTERNAL MEDICINE
Payer: MEDICAID

## 2022-03-25 DIAGNOSIS — C53.9 RECURRENT CERVICAL CANCER: ICD-10-CM

## 2022-03-25 DIAGNOSIS — C53.0 MALIGNANT NEOPLASM OF ENDOCERVIX: ICD-10-CM

## 2022-03-25 PROCEDURE — 78815 PET IMAGE W/CT SKULL-THIGH: CPT | Mod: TC

## 2022-03-25 PROCEDURE — 78815 PET IMAGE W/CT SKULL-THIGH: CPT | Mod: 26,PS,, | Performed by: RADIOLOGY

## 2022-03-25 PROCEDURE — 78815 NM PET CT ROUTINE: ICD-10-PCS | Mod: 26,PS,, | Performed by: RADIOLOGY

## 2022-03-31 ENCOUNTER — LAB VISIT (OUTPATIENT)
Dept: LAB | Facility: HOSPITAL | Age: 52
End: 2022-03-31
Attending: INTERNAL MEDICINE
Payer: MEDICAID

## 2022-03-31 ENCOUNTER — OFFICE VISIT (OUTPATIENT)
Dept: HEMATOLOGY/ONCOLOGY | Facility: CLINIC | Age: 52
End: 2022-03-31
Payer: MEDICAID

## 2022-03-31 VITALS
HEIGHT: 67 IN | BODY MASS INDEX: 31.45 KG/M2 | TEMPERATURE: 97 F | HEART RATE: 110 BPM | OXYGEN SATURATION: 98 % | DIASTOLIC BLOOD PRESSURE: 88 MMHG | SYSTOLIC BLOOD PRESSURE: 116 MMHG | WEIGHT: 200.38 LBS

## 2022-03-31 DIAGNOSIS — C53.0 MALIGNANT NEOPLASM OF ENDOCERVIX: ICD-10-CM

## 2022-03-31 DIAGNOSIS — C53.9 RECURRENT CERVICAL CANCER: ICD-10-CM

## 2022-03-31 DIAGNOSIS — Z17.0 MALIGNANT NEOPLASM OF OVERLAPPING SITES OF RIGHT BREAST IN FEMALE, ESTROGEN RECEPTOR POSITIVE: ICD-10-CM

## 2022-03-31 DIAGNOSIS — E03.9 HYPOTHYROIDISM, UNSPECIFIED TYPE: ICD-10-CM

## 2022-03-31 DIAGNOSIS — C50.811 MALIGNANT NEOPLASM OF OVERLAPPING SITES OF RIGHT BREAST IN FEMALE, ESTROGEN RECEPTOR POSITIVE: ICD-10-CM

## 2022-03-31 DIAGNOSIS — C77.3 MALIGNANT NEOPLASM METASTATIC TO LYMPH NODE OF AXILLA: ICD-10-CM

## 2022-03-31 DIAGNOSIS — Z72.0 TOBACCO ABUSE DISORDER: ICD-10-CM

## 2022-03-31 DIAGNOSIS — C79.51 SECONDARY CANCER OF BONE: ICD-10-CM

## 2022-03-31 DIAGNOSIS — N39.0 URINARY TRACT INFECTION WITHOUT HEMATURIA, SITE UNSPECIFIED: Primary | ICD-10-CM

## 2022-03-31 DIAGNOSIS — C73 PAPILLARY THYROID CARCINOMA: ICD-10-CM

## 2022-03-31 LAB
ALBUMIN SERPL BCP-MCNC: 4 G/DL (ref 3.5–5.2)
ALP SERPL-CCNC: 74 U/L (ref 55–135)
ALT SERPL W/O P-5'-P-CCNC: 13 U/L (ref 10–44)
ANION GAP SERPL CALC-SCNC: 13 MMOL/L (ref 8–16)
ANISOCYTOSIS BLD QL SMEAR: SLIGHT
AST SERPL-CCNC: 16 U/L (ref 10–40)
BASOPHILS # BLD AUTO: 0.03 K/UL (ref 0–0.2)
BASOPHILS NFR BLD: 0.4 % (ref 0–1.9)
BILIRUB SERPL-MCNC: 0.7 MG/DL (ref 0.1–1)
BUN SERPL-MCNC: 17 MG/DL (ref 6–20)
CALCIUM SERPL-MCNC: 9.6 MG/DL (ref 8.7–10.5)
CHLORIDE SERPL-SCNC: 105 MMOL/L (ref 95–110)
CO2 SERPL-SCNC: 22 MMOL/L (ref 23–29)
CREAT SERPL-MCNC: 1.3 MG/DL (ref 0.5–1.4)
DIFFERENTIAL METHOD: ABNORMAL
EOSINOPHIL # BLD AUTO: 0 K/UL (ref 0–0.5)
EOSINOPHIL NFR BLD: 0.6 % (ref 0–8)
ERYTHROCYTE [DISTWIDTH] IN BLOOD BY AUTOMATED COUNT: 18.3 % (ref 11.5–14.5)
EST. GFR  (AFRICAN AMERICAN): 54 ML/MIN/1.73 M^2
EST. GFR  (NON AFRICAN AMERICAN): 47 ML/MIN/1.73 M^2
GLUCOSE SERPL-MCNC: 118 MG/DL (ref 70–110)
HCT VFR BLD AUTO: 34.1 % (ref 37–48.5)
HGB BLD-MCNC: 11.1 G/DL (ref 12–16)
IMM GRANULOCYTES # BLD AUTO: 0.13 K/UL (ref 0–0.04)
IMM GRANULOCYTES NFR BLD AUTO: 1.8 % (ref 0–0.5)
LYMPHOCYTES # BLD AUTO: 2.1 K/UL (ref 1–4.8)
LYMPHOCYTES NFR BLD: 29.7 % (ref 18–48)
MCH RBC QN AUTO: 34.4 PG (ref 27–31)
MCHC RBC AUTO-ENTMCNC: 32.6 G/DL (ref 32–36)
MCV RBC AUTO: 106 FL (ref 82–98)
MONOCYTES # BLD AUTO: 0.9 K/UL (ref 0.3–1)
MONOCYTES NFR BLD: 13.1 % (ref 4–15)
NEUTROPHILS # BLD AUTO: 3.8 K/UL (ref 1.8–7.7)
NEUTROPHILS NFR BLD: 54.4 % (ref 38–73)
NRBC BLD-RTO: 0 /100 WBC
PLATELET # BLD AUTO: 200 K/UL (ref 150–450)
PLATELET BLD QL SMEAR: ABNORMAL
PMV BLD AUTO: 10.4 FL (ref 9.2–12.9)
POIKILOCYTOSIS BLD QL SMEAR: SLIGHT
POTASSIUM SERPL-SCNC: 4.5 MMOL/L (ref 3.5–5.1)
PROT SERPL-MCNC: 7.1 G/DL (ref 6–8.4)
RBC # BLD AUTO: 3.23 M/UL (ref 4–5.4)
SODIUM SERPL-SCNC: 140 MMOL/L (ref 136–145)
T4 FREE SERPL-MCNC: 1.2 NG/DL (ref 0.71–1.51)
TSH SERPL DL<=0.005 MIU/L-ACNC: 0.96 UIU/ML (ref 0.4–4)
WBC # BLD AUTO: 7.03 K/UL (ref 3.9–12.7)

## 2022-03-31 PROCEDURE — 84439 ASSAY OF FREE THYROXINE: CPT | Performed by: INTERNAL MEDICINE

## 2022-03-31 PROCEDURE — 99999 PR PBB SHADOW E&M-EST. PATIENT-LVL III: CPT | Mod: PBBFAC,,, | Performed by: INTERNAL MEDICINE

## 2022-03-31 PROCEDURE — 3074F PR MOST RECENT SYSTOLIC BLOOD PRESSURE < 130 MM HG: ICD-10-PCS | Mod: CPTII,,, | Performed by: INTERNAL MEDICINE

## 2022-03-31 PROCEDURE — 85025 COMPLETE CBC W/AUTO DIFF WBC: CPT | Performed by: INTERNAL MEDICINE

## 2022-03-31 PROCEDURE — 3079F DIAST BP 80-89 MM HG: CPT | Mod: CPTII,,, | Performed by: INTERNAL MEDICINE

## 2022-03-31 PROCEDURE — 3008F PR BODY MASS INDEX (BMI) DOCUMENTED: ICD-10-PCS | Mod: CPTII,,, | Performed by: INTERNAL MEDICINE

## 2022-03-31 PROCEDURE — 3074F SYST BP LT 130 MM HG: CPT | Mod: CPTII,,, | Performed by: INTERNAL MEDICINE

## 2022-03-31 PROCEDURE — 80053 COMPREHEN METABOLIC PANEL: CPT | Performed by: INTERNAL MEDICINE

## 2022-03-31 PROCEDURE — 36415 COLL VENOUS BLD VENIPUNCTURE: CPT | Performed by: INTERNAL MEDICINE

## 2022-03-31 PROCEDURE — 99215 OFFICE O/P EST HI 40 MIN: CPT | Mod: 25,S$PBB,, | Performed by: INTERNAL MEDICINE

## 2022-03-31 PROCEDURE — 99215 PR OFFICE/OUTPT VISIT, EST, LEVL V, 40-54 MIN: ICD-10-PCS | Mod: 25,S$PBB,, | Performed by: INTERNAL MEDICINE

## 2022-03-31 PROCEDURE — 3079F PR MOST RECENT DIASTOLIC BLOOD PRESSURE 80-89 MM HG: ICD-10-PCS | Mod: CPTII,,, | Performed by: INTERNAL MEDICINE

## 2022-03-31 PROCEDURE — 99999 PR PBB SHADOW E&M-EST. PATIENT-LVL III: ICD-10-PCS | Mod: PBBFAC,,, | Performed by: INTERNAL MEDICINE

## 2022-03-31 PROCEDURE — 99213 OFFICE O/P EST LOW 20 MIN: CPT | Mod: PBBFAC | Performed by: INTERNAL MEDICINE

## 2022-03-31 PROCEDURE — 3008F BODY MASS INDEX DOCD: CPT | Mod: CPTII,,, | Performed by: INTERNAL MEDICINE

## 2022-03-31 PROCEDURE — 84443 ASSAY THYROID STIM HORMONE: CPT | Performed by: INTERNAL MEDICINE

## 2022-03-31 RX ORDER — CIPROFLOXACIN 250 MG/1
250 TABLET, FILM COATED ORAL 2 TIMES DAILY
Qty: 14 TABLET | Refills: 0 | Status: SHIPPED | OUTPATIENT
Start: 2022-03-31 | End: 2022-04-07

## 2022-03-31 NOTE — ASSESSMENT & PLAN NOTE
Metastatic squamous cell carcinoma of the cervix. Status post prior lines of treatment and currently on chemo immunotherapy due to recent follicle progression/recurrence noted in PET scan from January of 2022.    She was started on Keytruda plus carboplatin and Taxol with plan to re-stage after 3 cycles.  Restaging PET scan from 03/25/2022 showed persistent cervical uptake with mild decrease in FDG avidity, interestingly there were new mildly FDG avid lymph nodes in the right inguinal region and additional area of soft tissue density in the posterior subcutaneous soft tissue in the medial right upper thigh.    Initial plan was that if there was no evidence of disease progression after 3 cycles, may consider local therapy in the cervix, however given results of most recent PET scan, with suspicious right inguinal adenopathy, I would recommend continuing systemic therapy at this time and re-stage after 3 or 4 more cycles.    Discussed with Dr. Vázquez at [facility] who will be seeing her for RT discussions.    I have reviewed labs today, no concerning cytopenia, will proceed with cycle 4 of chemo and immunotherapy.  Return in 3 weeks with repeat labs or sooner if needed.

## 2022-03-31 NOTE — ASSESSMENT & PLAN NOTE
Had been on systemic therapy with palbociclib and letrozole.  Palbociclib has been on hold due to persistent cytopenia.  Currently on hormone therapy alone.

## 2022-03-31 NOTE — PROGRESS NOTES
Subjective:      DATE OF VISIT: 3/31/2022   ?   Patient ID:?Josey Flores is a 51 y.o. female.?? MR#: [id number]   ?   PRIMARY PROVIDER: Dr. Asencio  ?   CHIEF COMPLAINT:  Follow-up  ?   ONCOLOGIC DIAGNOSIS:      Stage IV cervical squamous cell carcinoma    stage IV, T2 N1b M1, invasive breast carcinoma, ER/AK+, HER2-    Papillary thyroid carcinoma status post total thyroidectomy on 08/31/2017, mpT1b N0     Cervical HSIL MIREILLE 3 s/p LEEP, 2017  ?   CURRENT TREATMENT:    Letrozole  Carboplatin, paclitaxel and pembrolizumab, C1D1 1/28/22    PAST TREATMENT:    Palliative chemotherapy: cisplatin, paclitaxel and bevacizumab; 02/22/2021 cycle 1 day 1 -> bevacizumab maintenance after 6 cycles  Palbociclib and letrozole (palbociclib held with ongoing chemotherapy due to concern for cytopenias)    INTERVAL EVENTS    52 yo female with recurrent cervical cancer, currently on treatment with chemo/immunotherapy presents to discuss result of restaging PET scan. She continues to c/o fatigue and generalized weakness.     ROS  A comprehensive 14-point review of systems was reviewed with patient and was negative other than as specified above.   ?     Objective:      Physical Exam        Vitals:    03/31/22 0810   BP: 116/88   Pulse: 110   Temp: 97.1 °F (36.2 °C)      General appearance: Generally well appearing, in no acute distress.   Head, eyes, ears, nose, and throat: Oropharynx clear with moist mucous membranes.   Cardiovascular: Regular rate and rhythm, S1, S2, no audible murmurs.   Respiratory: Lungs clear to auscultation bilaterally.   Abdomen: nontender, nondistended.   Extremities: Warm, without edema.   Neurologic: Alert and oriented.  Skin: No rashes, ecchymoses or petechial lesion.   Psychiatric: normal mood and affect, conversant and appropriate    Laboratory:  ?   Lab Visit on 03/31/2022   Component Date Value Ref Range Status    TSH 03/31/2022 0.961  0.400 - 4.000 uIU/mL Final    Free T4 03/31/2022 1.20  0.71 -  1.51 ng/dL Final    WBC 03/31/2022 7.03  3.90 - 12.70 K/uL Final    RBC 03/31/2022 3.23 (A) 4.00 - 5.40 M/uL Final    Hemoglobin 03/31/2022 11.1 (A) 12.0 - 16.0 g/dL Final    Hematocrit 03/31/2022 34.1 (A) 37.0 - 48.5 % Final    MCV 03/31/2022 106 (A) 82 - 98 fL Final    MCH 03/31/2022 34.4 (A) 27.0 - 31.0 pg Final    MCHC 03/31/2022 32.6  32.0 - 36.0 g/dL Final    RDW 03/31/2022 18.3 (A) 11.5 - 14.5 % Final    Platelets 03/31/2022 200  150 - 450 K/uL Final    MPV 03/31/2022 10.4  9.2 - 12.9 fL Final    Immature Granulocytes 03/31/2022 1.8 (A) 0.0 - 0.5 % Final    Gran # (ANC) 03/31/2022 3.8  1.8 - 7.7 K/uL Final    Immature Grans (Abs) 03/31/2022 0.13 (A) 0.00 - 0.04 K/uL Final    Lymph # 03/31/2022 2.1  1.0 - 4.8 K/uL Final    Mono # 03/31/2022 0.9  0.3 - 1.0 K/uL Final    Eos # 03/31/2022 0.0  0.0 - 0.5 K/uL Final    Baso # 03/31/2022 0.03  0.00 - 0.20 K/uL Final    nRBC 03/31/2022 0  0 /100 WBC Final    Gran % 03/31/2022 54.4  38.0 - 73.0 % Final    Lymph % 03/31/2022 29.7  18.0 - 48.0 % Final    Mono % 03/31/2022 13.1  4.0 - 15.0 % Final    Eosinophil % 03/31/2022 0.6  0.0 - 8.0 % Final    Basophil % 03/31/2022 0.4  0.0 - 1.9 % Final    Platelet Estimate 03/31/2022 Appears normal   Final    Aniso 03/31/2022 Slight   Final    Poik 03/31/2022 Slight   Final    Differential Method 03/31/2022 Automated   Final    Sodium 03/31/2022 140  136 - 145 mmol/L Final    Potassium 03/31/2022 4.5  3.5 - 5.1 mmol/L Final    Chloride 03/31/2022 105  95 - 110 mmol/L Final    CO2 03/31/2022 22 (A) 23 - 29 mmol/L Final    Glucose 03/31/2022 118 (A) 70 - 110 mg/dL Final    BUN 03/31/2022 17  6 - 20 mg/dL Final    Creatinine 03/31/2022 1.3  0.5 - 1.4 mg/dL Final    Calcium 03/31/2022 9.6  8.7 - 10.5 mg/dL Final    Total Protein 03/31/2022 7.1  6.0 - 8.4 g/dL Final    Albumin 03/31/2022 4.0  3.5 - 5.2 g/dL Final    Total Bilirubin 03/31/2022 0.7  0.1 - 1.0 mg/dL Final    Alkaline Phosphatase  03/31/2022 74  55 - 135 U/L Final    AST 03/31/2022 16  10 - 40 U/L Final    ALT 03/31/2022 13  10 - 44 U/L Final    Anion Gap 03/31/2022 13  8 - 16 mmol/L Final    eGFR if  03/31/2022 54 (A) >60 mL/min/1.73 m^2 Final    eGFR if non  03/31/2022 47 (A) >60 mL/min/1.73 m^2 Final      Lab Results   Component Value Date    WBC 7.03 03/31/2022    HGB 11.1 (L) 03/31/2022    HCT 34.1 (L) 03/31/2022     (H) 03/31/2022     03/31/2022         Chemistry        Component Value Date/Time     03/31/2022 0818    K 4.5 03/31/2022 0818     03/31/2022 0818    CO2 22 (L) 03/31/2022 0818    BUN 17 03/31/2022 0818    CREATININE 1.3 03/31/2022 0818     (H) 03/31/2022 0818        Component Value Date/Time    CALCIUM 9.6 03/31/2022 0818    ALKPHOS 74 03/31/2022 0818    AST 16 03/31/2022 0818    ALT 13 03/31/2022 0818    BILITOT 0.7 03/31/2022 0818    ESTGFRAFRICA 54 (A) 03/31/2022 0818    EGFRNONAA 47 (A) 03/31/2022 0818          ? IMAGING   Results for orders placed or performed during the hospital encounter of 03/25/22 (from the past 2160 hour(s))   CT/PET SCAN ROUTINE    Impression    Persistent cervical uptake with max SUV now measuring 14 compared with 17 on the prior study.  There are also not enlarged but now newly mildly FDG avid lymph nodes in the right inguinal region with max SUV of 3.1 as above.  Nonspecific mildly FDG avid mediastinal lymph nodes.  Additional area of soft tissue density in the posterior subcutaneous soft tissues in the medial right upper thigh with FDG uptake abutting the skin surface.  Correlate with exam findings.  Other findings as above.      Electronically signed by: Bandar Colón MD  Date:    03/25/2022  Time:    13:50     *Note: Due to a large number of results and/or encounters for the requested time period, some results have not been displayed. A complete set of results can be found in Results Review.     No results found. However,  due to the size of the patient record, not all encounters were searched. Please check Results Review for a complete set of results.    PATHOLOGY  2/2021  Station 7 lymph node, fine needle aspiration (8 smears, 1 cell block):       -  Positive for malignant cells, morphologically consistent with   metastatic squamous cell carcinoma     Assessment/Plan:       1. Urinary tract infection without hematuria, site unspecified    2. Malignant neoplasm metastatic to lymph node of axilla    3. Hypothyroidism, unspecified type    4. Malignant neoplasm of overlapping sites of right breast in female, estrogen receptor positive    5. Secondary cancer of bone    6. Papillary thyroid carcinoma    7. Tobacco abuse disorder          Plan:     Malignant neoplasm metastatic to lymph node of axilla  Metastatic squamous cell carcinoma of the cervix. Status post prior lines of treatment and currently on chemo immunotherapy due to recent follicle progression/recurrence noted in PET scan from January of 2022.    She was started on Keytruda plus carboplatin and Taxol with plan to re-stage after 3 cycles.  Restaging PET scan from 03/25/2022 showed persistent cervical uptake with mild decrease in FDG avidity, interestingly there were new mildly FDG avid lymph nodes in the right inguinal region and additional area of soft tissue density in the posterior subcutaneous soft tissue in the medial right upper thigh.    Initial plan was that if there was no evidence of disease progression after 3 cycles, may consider local therapy in the cervix, however given results of most recent PET scan, with suspicious right inguinal adenopathy, I would recommend continuing systemic therapy at this time and re-stage after 3 or 4 more cycles.    Discussed with Dr. V[facility]quez at [facility] who will be seeing her for RT discussions.    I have reviewed labs today, no concerning cytopenia, will proceed with cycle 4 of chemo and immunotherapy.  Return in 3 weeks  with repeat labs or sooner if needed.    Hypothyroidism  Acquired hypothyroidism, secondary to immunotherapy.  Continue Synthroid at 250 mcg daily.  Monitored and managed by PCP/endocrinologist.    Malignant neoplasm of overlapping sites of right breast in female, estrogen receptor positive  Had been on systemic therapy with palbociclib and letrozole.  Palbociclib has been on hold due to persistent cytopenia.  Currently on hormone therapy alone.       Secondary cancer of bone  on zoledronic acid dosed every 12 weeks. - Last zometa 1/18/22; next planned 4/12/22  DEXA 8/2021 without bone loss   Recent PET scan January 2022 without evidence of new bony metastatic disease.  She feels most comfortable with maintenance q.3 months which is reasonable. Continue calcium vitamin-D supplementation.    Papillary thyroid carcinoma  s/p total thyroidectomy on levothyroxine.     Tobacco abuse disorder  Counseled on tobacco cessation.    Acute cystitis: Continues to c/o dysuria. Recently completed cefdinir. Will initiate ciprofloxacin.       Urinary tract infection without hematuria, site unspecified  -     ciprofloxacin HCl (CIPRO) 250 MG tablet; Take 1 tablet (250 mg total) by mouth 2 (two) times daily. for 7 days  Dispense: 14 tablet; Refill: 0    Malignant neoplasm metastatic to lymph node of axilla  -     CBC Auto Differential; Future; Expected date: 03/31/2022  -     Comprehensive Metabolic Panel; Future; Expected date: 03/31/2022  -     TSH; Future; Expected date: 03/31/2022  -     FREE T4; Future; Expected date: 03/31/2022    Hypothyroidism, unspecified type  -     CBC Auto Differential; Future; Expected date: 03/31/2022  -     Comprehensive Metabolic Panel; Future; Expected date: 03/31/2022  -     TSH; Future; Expected date: 03/31/2022  -     FREE T4; Future; Expected date: 03/31/2022    Malignant neoplasm of overlapping sites of right breast in female, estrogen receptor positive  -     CBC Auto Differential; Future;  Expected date: 03/31/2022  -     Comprehensive Metabolic Panel; Future; Expected date: 03/31/2022  -     TSH; Future; Expected date: 03/31/2022  -     FREE T4; Future; Expected date: 03/31/2022    Secondary cancer of bone  -     CBC Auto Differential; Future; Expected date: 03/31/2022  -     Comprehensive Metabolic Panel; Future; Expected date: 03/31/2022  -     TSH; Future; Expected date: 03/31/2022  -     FREE T4; Future; Expected date: 03/31/2022    Papillary thyroid carcinoma  -     CBC Auto Differential; Future; Expected date: 03/31/2022  -     Comprehensive Metabolic Panel; Future; Expected date: 03/31/2022  -     TSH; Future; Expected date: 03/31/2022  -     FREE T4; Future; Expected date: 03/31/2022    Tobacco abuse disorder  -     CBC Auto Differential; Future; Expected date: 03/31/2022  -     Comprehensive Metabolic Panel; Future; Expected date: 03/31/2022  -     TSH; Future; Expected date: 03/31/2022  -     FREE T4; Future; Expected date: 03/31/2022      Devante Moseley MD

## 2022-03-31 NOTE — TELEPHONE ENCOUNTER
Mrs. Flores declined refill, stating chemo is on hold. Per VINNY Rivera note on 2/2, palbociclib held with ongoing chemotherapy due to concern for cytopenias. Is getting carboplatin, paclitaxel and pembrolizumab--C1D1 was 1/28/22. Per Dr. Luna note on 12/28, We discussed recommendation for systemic chemotherapy given advanced cervical squamous cell carcinoma with cisplatin, paclitaxel and bevacizumab with discontinuation of palbociclib but can continue aromatase inhibitor; taxane may also be active for her concomitant metastatic breast cancer.   This regimen was given for up to 35 cycles in the KEYNOTE-826 trial.   [employer] message sent to VINNY Prieto and Dr. Asencio on 2/9 to inquire into possible number of cycles of carboplatin-paclitaxel-pembrolizumab to pend palbociclib refill.  Per Dr. Montenegro In[employer] message on 2/15, I do not know exactly a lot of factors going into this. At least 3 restaging and then maybe 6 total. C1D1 was 1/28. C3D1 is 3/11 and PET CT is on 3/25 @0945.  Follow up with Dr. Moseley was on 3/28 @1040. Per Dr. Botello office visit note on 3/31, Had been on systemic therapy with palbociclib and letrozole.  Palbociclib has been on hold due to persistent cytopenia.  Currently on hormone therapy alone. Next follow up has not been scheduled yet. Desired follow up in ~3 weeks.  Will pend palbociclib refill to 4/21/2022.

## 2022-03-31 NOTE — ASSESSMENT & PLAN NOTE
Acquired hypothyroidism, secondary to immunotherapy.  Continue Synthroid at 250 mcg daily.  Monitored and managed by PCP/endocrinologist.

## 2022-03-31 NOTE — ASSESSMENT & PLAN NOTE
on zoledronic acid dosed every 12 weeks. - Last zometa 1/18/22; next planned 4/12/22  DEXA 8/2021 without bone loss   Recent PET scan January 2022 without evidence of new bony metastatic disease.  She feels most comfortable with maintenance q.3 months which is reasonable. Continue calcium vitamin-D supplementation.

## 2022-04-01 ENCOUNTER — TELEPHONE (OUTPATIENT)
Dept: HEMATOLOGY/ONCOLOGY | Facility: CLINIC | Age: 52
End: 2022-04-01
Payer: MEDICAID

## 2022-04-01 ENCOUNTER — PATIENT MESSAGE (OUTPATIENT)
Dept: PHARMACY | Facility: CLINIC | Age: 52
End: 2022-04-01
Payer: MEDICAID

## 2022-04-01 DIAGNOSIS — G89.3 CANCER ASSOCIATED PAIN: ICD-10-CM

## 2022-04-01 RX ORDER — HYDROCODONE BITARTRATE AND ACETAMINOPHEN 10; 325 MG/1; MG/1
1 TABLET ORAL EVERY 4 HOURS PRN
Qty: 180 TABLET | Refills: 0 | Status: SHIPPED | OUTPATIENT
Start: 2022-04-01 | End: 2022-04-29 | Stop reason: SDUPTHER

## 2022-04-01 RX ORDER — MORPHINE SULFATE 30 MG/1
30 TABLET, FILM COATED, EXTENDED RELEASE ORAL EVERY 12 HOURS
Qty: 60 TABLET | Refills: 0 | Status: SHIPPED | OUTPATIENT
Start: 2022-04-07 | End: 2022-04-29 | Stop reason: SDUPTHER

## 2022-04-01 NOTE — TELEPHONE ENCOUNTER
930am LM for pt to contact me directly as to why she has not shown up for her chemo infusion as set for 9am today.

## 2022-04-05 ENCOUNTER — SPECIALTY PHARMACY (OUTPATIENT)
Dept: PHARMACY | Facility: CLINIC | Age: 52
End: 2022-04-05
Payer: MEDICAID

## 2022-04-05 DIAGNOSIS — C50.811 MALIGNANT NEOPLASM OF OVERLAPPING SITES OF RIGHT BREAST IN FEMALE, ESTROGEN RECEPTOR POSITIVE: Primary | ICD-10-CM

## 2022-04-05 DIAGNOSIS — Z17.0 MALIGNANT NEOPLASM OF OVERLAPPING SITES OF RIGHT BREAST IN FEMALE, ESTROGEN RECEPTOR POSITIVE: Primary | ICD-10-CM

## 2022-04-05 NOTE — TELEPHONE ENCOUNTER
Specialty Pharmacy - Refill Coordination    Specialty Medication Orders Linked to Encounter    Flowsheet Row Most Recent Value   Medication #1 letrozole (FEMARA) 2.5 mg Tab (Order#[id number], Rx#[id number])          Refill Questions - Documented Responses    Flowsheet Row Most Recent Value   Patient Availability and HIPAA Verification    Does patient want to proceed with activity? Yes   HIPAA/medical authority confirmed? Yes   Relationship to patient of person spoken to? Self   Refill Screening Questions    Changes to allergies? No   Changes to medications? No   New conditions since last clinic visit? No   Unplanned office visit, urgent care, ED, or hospital admission in the last 4 weeks? No   How does patient/caregiver feel medication is working? Excellent   Financial problems or insurance changes? No   How many doses of your specialty medications were missed in the last 4 weeks? 1   Would patient like to speak to a pharmacist? No   When does the patient need to receive the medication? 04/11/22   Refill Delivery Questions    How will the patient receive the medication? Delivery Pauly   When does the patient need to receive the medication? 04/11/22   Shipping Address Home   Expected Copay ($) 0   Is the patient able to afford the medication copay? Yes   Payment Method zero copay   Days supply of Refill 30   Supplies needed? No supplies needed   Refill activity completed? Yes   Refill activity plan Refill scheduled   Shipment/Pickup Date: 04/07/22          Current Outpatient Medications   Medication Sig    albuterol (PROVENTIL) 2.5 mg /3 mL (0.083 %) nebulizer solution Take 3 mLs (2.5 mg total) by nebulization every 4 to 6 hours as needed for Wheezing or Shortness of Breath.    albuterol (PROVENTIL/VENTOLIN HFA) 90 mcg/actuation inhaler Inhale 2 puffs into the lungs every 4 (four) hours as needed for Wheezing or Shortness of Breath.    ALPRAZolam (XANAX) 1 MG tablet Take 0.5-1 tablets (0.5-1 mg total) by mouth 2  (two) times daily as needed for Anxiety.    buPROPion (WELLBUTRIN SR) 150 MG TBSR 12 hr tablet Take 1 tablet (150 mg total) by mouth 2 (two) times daily. Take 1 tablet (150mg) once daily for 1 week then increase to twice daily    calcium carbonate 400 mg calcium (1,000 mg) Chew Take 2,000 mg by mouth once daily.    ciprofloxacin HCl (CIPRO) 250 MG tablet Take 1 tablet (250 mg total) by mouth 2 (two) times daily. for 7 days    cyanocobalamin (VITAMIN B-12) 1000 MCG tablet Take 1 tablet (1,000 mcg total) by mouth once daily.    dexAMETHasone (DECADRON) 4 MG Tab Take 2 tablets (8 mg total) by mouth once daily. Take as directed on days 2, 3, and 4 of your chemotherapy cycle.    diphenoxylate-atropine 2.5-0.025 mg (LOMOTIL) 2.5-0.025 mg per tablet Take 1 tablet by mouth 4 (four) times daily as needed for Diarrhea.    ergocalciferol (VITAMIN D2) 50,000 unit Cap Take 5,000 Units by mouth once daily at 6am.     HYDROcodone-acetaminophen (NORCO)  mg per tablet Take 1 tablet by mouth every 4 (four) hours as needed for Pain. No more than 4 doses/ day    hydrOXYzine HCL (ATARAX) 25 MG tablet Take 1 tablet (25 mg total) by mouth 3 (three) times daily as needed for Itching.    ipratropium (ATROVENT) 42 mcg (0.06 %) nasal spray 2 sprays by Nasal route 4 (four) times daily. As needed for rhinitis. Take in evening before bed and in the morning    letrozole (FEMARA) 2.5 mg Tab Take 1 tablet (2.5 mg) by mouth once daily.    levothyroxine (EUTHYROX) 200 MCG tablet Take 1 tablet (200 mcg total) by mouth once daily.    levothyroxine (SYNTHROID) 50 MCG tablet Take 1 tablet (50 mcg total) by mouth once daily.    magnesium oxide (MAGOX) 400 mg (241.3 mg magnesium) tablet Take 1 tablet (400 mg total) by mouth once daily.    [START ON 4/7/2022] morphine (MS CONTIN) 30 MG 12 hr tablet Take 1 tablet (30 mg total) by mouth every 12 (twelve) hours.    multivitamin (THERAGRAN) per tablet Take 1 tablet by mouth once daily.     OLANZapine (ZYPREXA) 5 MG tablet Take 1 tablet (5 mg total) by mouth nightly. Take on evenings of chemotherapy days 1, 2, 3 and 4    OLANZapine (ZYPREXA) 5 MG tablet Take 1 tablet (5 mg total) by mouth every evening. Take as directed on days 1-4 of your chemotherapy cycle. May increase to 10 mg if breakthrough nausea occurs.    ondansetron (ZOFRAN-ODT) 8 MG TbDL Take 1 tablet (8 mg total) by mouth every 8 (eight) hours as needed (nausea/vomiting).    palbociclib (IBRANCE) 125 mg Cap Take one capsule (125 mg) by mouth once daily on days 1-21 of each 28-day cycle.    potassium chloride SA (K-DUR,KLOR-CON) 20 MEQ tablet Take 1 tablet (20 mEq total) by mouth once daily. Take daily for 3 days.   Last reviewed on 3/11/2022  4:51 PM by Sara Rodriguez RN    Review of patient's allergies indicates:   Allergen Reactions    Gabapentin Swelling     Note: unilateral joint edema, unclear if due to gabapentin    Nsaids (non-steroidal anti-inflammatory drug) Other (See Comments)     Instructed not to take NSAID drugs with chemo pill.    Clindamycin Rash    Vancomycin analogues Itching    Last reviewed on  3/31/2022 8:25 AM by Funmilayo Weiss    Spoke with Mrs. Flores. She reported 0 missed doses. Of note, she reported she had an infection and letrozole was held in hospital. She reported a 7-day supply in his possession. She denied any changes in allergies or medical conditions. She reported initiating ciprofloxacin. Drug-drug interactions between ciprofloxacin and letrozole were not identified. Of note, Dr. Moseley is aware per his office visit note on 3/31. Delivery [facility]  scheduled for 4/7 for delivery on 4/7. $0 copay at 004. Confirmed 2 patient identifiers, allergies, medication list, comorbidities, shipping address, and payment information.    Tasks added this encounter   5/4/2022 - Refill Call (Auto Added)   Tasks due within next 3 months   No tasks due.     Bright Guallpa, PharmD  [facility]  [id number] [facility] [id number]  Phone: [phone]  Fax: [phone]

## 2022-04-07 ENCOUNTER — LAB VISIT (OUTPATIENT)
Dept: LAB | Facility: HOSPITAL | Age: 52
End: 2022-04-07
Attending: INTERNAL MEDICINE
Payer: MEDICAID

## 2022-04-07 ENCOUNTER — OFFICE VISIT (OUTPATIENT)
Dept: HEMATOLOGY/ONCOLOGY | Facility: CLINIC | Age: 52
End: 2022-04-07
Payer: MEDICAID

## 2022-04-07 DIAGNOSIS — Z72.0 TOBACCO ABUSE DISORDER: ICD-10-CM

## 2022-04-07 DIAGNOSIS — C79.51 SECONDARY CANCER OF BONE: ICD-10-CM

## 2022-04-07 DIAGNOSIS — Z17.0 MALIGNANT NEOPLASM OF OVERLAPPING SITES OF RIGHT BREAST IN FEMALE, ESTROGEN RECEPTOR POSITIVE: ICD-10-CM

## 2022-04-07 DIAGNOSIS — C73 PAPILLARY THYROID CARCINOMA: ICD-10-CM

## 2022-04-07 DIAGNOSIS — E03.9 HYPOTHYROIDISM, UNSPECIFIED TYPE: ICD-10-CM

## 2022-04-07 DIAGNOSIS — C50.811 MALIGNANT NEOPLASM OF OVERLAPPING SITES OF RIGHT BREAST IN FEMALE, ESTROGEN RECEPTOR POSITIVE: ICD-10-CM

## 2022-04-07 DIAGNOSIS — C77.3 MALIGNANT NEOPLASM METASTATIC TO LYMPH NODE OF AXILLA: ICD-10-CM

## 2022-04-07 LAB
ALBUMIN SERPL BCP-MCNC: 4.1 G/DL (ref 3.5–5.2)
ALP SERPL-CCNC: 75 U/L (ref 55–135)
ALT SERPL W/O P-5'-P-CCNC: 14 U/L (ref 10–44)
ANION GAP SERPL CALC-SCNC: 12 MMOL/L (ref 8–16)
AST SERPL-CCNC: 15 U/L (ref 10–40)
BASOPHILS # BLD AUTO: 0.02 K/UL (ref 0–0.2)
BASOPHILS NFR BLD: 0.2 % (ref 0–1.9)
BILIRUB SERPL-MCNC: 0.6 MG/DL (ref 0.1–1)
BUN SERPL-MCNC: 11 MG/DL (ref 6–20)
CALCIUM SERPL-MCNC: 9 MG/DL (ref 8.7–10.5)
CHLORIDE SERPL-SCNC: 105 MMOL/L (ref 95–110)
CO2 SERPL-SCNC: 25 MMOL/L (ref 23–29)
CREAT SERPL-MCNC: 1.2 MG/DL (ref 0.5–1.4)
DIFFERENTIAL METHOD: ABNORMAL
EOSINOPHIL # BLD AUTO: 0.4 K/UL (ref 0–0.5)
EOSINOPHIL NFR BLD: 3.7 % (ref 0–8)
ERYTHROCYTE [DISTWIDTH] IN BLOOD BY AUTOMATED COUNT: 17.5 % (ref 11.5–14.5)
EST. GFR  (AFRICAN AMERICAN): 60 ML/MIN/1.73 M^2
EST. GFR  (NON AFRICAN AMERICAN): 52 ML/MIN/1.73 M^2
GLUCOSE SERPL-MCNC: 118 MG/DL (ref 70–110)
HCT VFR BLD AUTO: 35.9 % (ref 37–48.5)
HGB BLD-MCNC: 11.4 G/DL (ref 12–16)
IMM GRANULOCYTES # BLD AUTO: 0.12 K/UL (ref 0–0.04)
IMM GRANULOCYTES NFR BLD AUTO: 1.2 % (ref 0–0.5)
LYMPHOCYTES # BLD AUTO: 1.5 K/UL (ref 1–4.8)
LYMPHOCYTES NFR BLD: 15.3 % (ref 18–48)
MCH RBC QN AUTO: 33.7 PG (ref 27–31)
MCHC RBC AUTO-ENTMCNC: 31.8 G/DL (ref 32–36)
MCV RBC AUTO: 106 FL (ref 82–98)
MONOCYTES # BLD AUTO: 1.1 K/UL (ref 0.3–1)
MONOCYTES NFR BLD: 11.1 % (ref 4–15)
NEUTROPHILS # BLD AUTO: 6.6 K/UL (ref 1.8–7.7)
NEUTROPHILS NFR BLD: 68.5 % (ref 38–73)
NRBC BLD-RTO: 0 /100 WBC
PLATELET # BLD AUTO: 234 K/UL (ref 150–450)
PMV BLD AUTO: 10.9 FL (ref 9.2–12.9)
POTASSIUM SERPL-SCNC: 4.2 MMOL/L (ref 3.5–5.1)
PROT SERPL-MCNC: 7 G/DL (ref 6–8.4)
RBC # BLD AUTO: 3.38 M/UL (ref 4–5.4)
SODIUM SERPL-SCNC: 142 MMOL/L (ref 136–145)
T4 FREE SERPL-MCNC: 1.41 NG/DL (ref 0.71–1.51)
TSH SERPL DL<=0.005 MIU/L-ACNC: 0.78 UIU/ML (ref 0.4–4)
WBC # BLD AUTO: 9.62 K/UL (ref 3.9–12.7)

## 2022-04-07 PROCEDURE — 99215 PR OFFICE/OUTPT VISIT, EST, LEVL V, 40-54 MIN: ICD-10-PCS | Mod: 95,,, | Performed by: INTERNAL MEDICINE

## 2022-04-07 PROCEDURE — 84443 ASSAY THYROID STIM HORMONE: CPT | Performed by: INTERNAL MEDICINE

## 2022-04-07 PROCEDURE — 36415 COLL VENOUS BLD VENIPUNCTURE: CPT | Performed by: INTERNAL MEDICINE

## 2022-04-07 PROCEDURE — 99215 OFFICE O/P EST HI 40 MIN: CPT | Mod: 95,,, | Performed by: INTERNAL MEDICINE

## 2022-04-07 PROCEDURE — 80053 COMPREHEN METABOLIC PANEL: CPT | Performed by: INTERNAL MEDICINE

## 2022-04-07 PROCEDURE — 84439 ASSAY OF FREE THYROXINE: CPT | Performed by: INTERNAL MEDICINE

## 2022-04-07 PROCEDURE — 85025 COMPLETE CBC W/AUTO DIFF WBC: CPT | Performed by: INTERNAL MEDICINE

## 2022-04-07 RX ORDER — EPINEPHRINE 0.3 MG/.3ML
0.3 INJECTION SUBCUTANEOUS ONCE AS NEEDED
Status: CANCELLED | OUTPATIENT
Start: 2022-04-08

## 2022-04-07 RX ORDER — FAMOTIDINE 10 MG/ML
20 INJECTION INTRAVENOUS
Status: CANCELLED | OUTPATIENT
Start: 2022-04-08

## 2022-04-07 RX ORDER — DIPHENHYDRAMINE HYDROCHLORIDE 50 MG/ML
50 INJECTION INTRAMUSCULAR; INTRAVENOUS ONCE AS NEEDED
Status: CANCELLED | OUTPATIENT
Start: 2022-04-08

## 2022-04-07 RX ORDER — SODIUM CHLORIDE 0.9 % (FLUSH) 0.9 %
10 SYRINGE (ML) INJECTION
Status: CANCELLED | OUTPATIENT
Start: 2022-04-08

## 2022-04-07 RX ORDER — LORAZEPAM 2 MG/ML
0.5 INJECTION INTRAMUSCULAR
Status: CANCELLED
Start: 2022-04-08 | End: 2022-04-08

## 2022-04-07 RX ORDER — HEPARIN 100 UNIT/ML
500 SYRINGE INTRAVENOUS
Status: CANCELLED | OUTPATIENT
Start: 2022-04-08

## 2022-04-07 NOTE — PROGRESS NOTES
Subjective:      DATE OF VISIT: 4/7/2022   ?   Patient ID:?Josey Flores is a 51 y.o. female.?? MR#: [id number]   ?   PRIMARY PROVIDER: Dr. Asencio  ?   CHIEF COMPLAINT:  Follow-up  ?   ONCOLOGIC DIAGNOSIS:      Stage IV cervical squamous cell carcinoma    stage IV, T2 N1b M1, invasive breast carcinoma, ER/CO+, HER2-    Papillary thyroid carcinoma status post total thyroidectomy on 08/31/2017, mpT1b N0     Cervical HSIL MIREILLE 3 s/p LEEP, 2017  ?   CURRENT TREATMENT:    Letrozole  Carboplatin, paclitaxel and pembrolizumab, C1D1 1/28/22    PAST TREATMENT:    Palliative chemotherapy: cisplatin, paclitaxel and bevacizumab; 02/22/2021 cycle 1 day 1 -> bevacizumab maintenance after 6 cycles  Palbociclib and letrozole (palbociclib held with ongoing chemotherapy due to concern for cytopenias)    INTERVAL EVENTS    50 yo female with recurrent cervical cancer, currently on treatment with chemo/immunotherapy presents today for treatment. She continues to c/o fatigue and generalized weakness.     ROS  A comprehensive 14-point review of systems was reviewed with patient and was negative other than as specified above.   ?     Objective:      Physical Exam    Unable to assess    There were no vitals filed for this visit.     Laboratory:  ?   Lab Visit on 04/07/2022   Component Date Value Ref Range Status    WBC 04/07/2022 9.62  3.90 - 12.70 K/uL Final    RBC 04/07/2022 3.38 (A) 4.00 - 5.40 M/uL Final    Hemoglobin 04/07/2022 11.4 (A) 12.0 - 16.0 g/dL Final    Hematocrit 04/07/2022 35.9 (A) 37.0 - 48.5 % Final    MCV 04/07/2022 106 (A) 82 - 98 fL Final    MCH 04/07/2022 33.7 (A) 27.0 - 31.0 pg Final    MCHC 04/07/2022 31.8 (A) 32.0 - 36.0 g/dL Final    RDW 04/07/2022 17.5 (A) 11.5 - 14.5 % Final    Platelets 04/07/2022 234  150 - 450 K/uL Final    MPV 04/07/2022 10.9  9.2 - 12.9 fL Final    Immature Granulocytes 04/07/2022 1.2 (A) 0.0 - 0.5 % Final    Gran # (ANC) 04/07/2022 6.6  1.8 - 7.7 K/uL Final    Immature  Grans (Abs) 04/07/2022 0.12 (A) 0.00 - 0.04 K/uL Final    Lymph # 04/07/2022 1.5  1.0 - 4.8 K/uL Final    Mono # 04/07/2022 1.1 (A) 0.3 - 1.0 K/uL Final    Eos # 04/07/2022 0.4  0.0 - 0.5 K/uL Final    Baso # 04/07/2022 0.02  0.00 - 0.20 K/uL Final    nRBC 04/07/2022 0  0 /100 WBC Final    Gran % 04/07/2022 68.5  38.0 - 73.0 % Final    Lymph % 04/07/2022 15.3 (A) 18.0 - 48.0 % Final    Mono % 04/07/2022 11.1  4.0 - 15.0 % Final    Eosinophil % 04/07/2022 3.7  0.0 - 8.0 % Final    Basophil % 04/07/2022 0.2  0.0 - 1.9 % Final    Differential Method 04/07/2022 Automated   Final      Lab Results   Component Value Date    WBC 9.62 04/07/2022    HGB 11.4 (L) 04/07/2022    HCT 35.9 (L) 04/07/2022     (H) 04/07/2022     04/07/2022         Chemistry        Component Value Date/Time     03/31/2022 0818    K 4.5 03/31/2022 0818     03/31/2022 0818    CO2 22 (L) 03/31/2022 0818    BUN 17 03/31/2022 0818    CREATININE 1.3 03/31/2022 0818     (H) 03/31/2022 0818        Component Value Date/Time    CALCIUM 9.6 03/31/2022 0818    ALKPHOS 74 03/31/2022 0818    AST 16 03/31/2022 0818    ALT 13 03/31/2022 0818    BILITOT 0.7 03/31/2022 0818    ESTGFRAFRICA 54 (A) 03/31/2022 0818    EGFRNONAA 47 (A) 03/31/2022 0818          ? IMAGING   Results for orders placed or performed during the hospital encounter of 03/25/22 (from the past 2160 hour(s))   CT/PET SCAN ROUTINE    Impression    Persistent cervical uptake with max SUV now measuring 14 compared with 17 on the prior study.  There are also not enlarged but now newly mildly FDG avid lymph nodes in the right inguinal region with max SUV of 3.1 as above.  Nonspecific mildly FDG avid mediastinal lymph nodes.  Additional area of soft tissue density in the posterior subcutaneous soft tissues in the medial right upper thigh with FDG uptake abutting the skin surface.  Correlate with exam findings.  Other findings as above.      Electronically signed  by: Bandar Colón MD  Date:    03/25/2022  Time:    13:50     *Note: Due to a large number of results and/or encounters for the requested time period, some results have not been displayed. A complete set of results can be found in Results Review.     No results found. However, due to the size of the patient record, not all encounters were searched. Please check Results Review for a complete set of results.    PATHOLOGY  2/2021  Station 7 lymph node, fine needle aspiration (8 smears, 1 cell block):       -  Positive for malignant cells, morphologically consistent with   metastatic squamous cell carcinoma     Assessment/Plan:       1. Malignant neoplasm metastatic to lymph node of axilla    2. Hypothyroidism, unspecified type    3. Malignant neoplasm of overlapping sites of right breast in female, estrogen receptor positive    4. Secondary cancer of bone    5. Papillary thyroid carcinoma    6. Tobacco abuse disorder          Plan:     Malignant neoplasm metastatic to lymph node of axilla  Metastatic squamous cell carcinoma of the cervix. Status post prior lines of treatment and currently on chemo immunotherapy due to recent follicle progression/recurrence noted in PET scan from January of 2022.     She was started on Keytruda plus carboplatin and Taxol with plan to re-stage after 3 cycles.  Restaging PET scan from 03/25/2022 showed persistent cervical uptake with mild decrease in FDG avidity, interestingly there were new mildly FDG avid lymph nodes in the right inguinal region and additional area of soft tissue density in the posterior subcutaneous soft tissue in the medial right upper thigh.     Initial plan was that if there was no evidence of disease progression after 3 cycles, may consider local therapy in the cervix, however given results of most recent PET scan, with suspicious right inguinal adenopathy, I would recommend continuing systemic therapy at this time and re-stage after 3 or 4 more  cycles.     Discussed with Dr. Vázquez at [facility] who agreed to continue systemic therapy at this time.     I have reviewed labs today, no concerning cytopenia, will proceed with cycle 4 of chemo (patient claimed that primary oncologist discontinued immunotherapy due to acquired hypothyroidism).  Return in 3 weeks with repeat labs or sooner if needed.    Hypothyroidism  Acquired hypothyroidism, secondary to immunotherapy.  Continue Synthroid at 250 mcg daily.  Monitored and managed by PCP/endocrinologist.    Malignant neoplasm of overlapping sites of right breast in female, estrogen receptor positive  Had been on systemic therapy with palbociclib and letrozole.  Palbociclib has been on hold due to persistent cytopenia.  Currently on hormone therapy alone.    Secondary cancer of bone  on zoledronic acid dosed every 12 weeks. - Last zometa 1/18/22; next planned 4/12/22 (will give today, 04/07/2022)  DEXA 8/2021 without bone loss   Recent PET scan January 2022 without evidence of new bony metastatic disease.  She feels most comfortable with maintenance q.3 months which is reasonable. Continue calcium vitamin-D supplementation.    Papillary thyroid carcinoma  s/p total thyroidectomy on levothyroxine.     Tobacco abuse disorder  Counseled on tobacco cessation.    Malignant neoplasm metastatic to lymph node of axilla  -     CBC Auto Differential; Future; Expected date: 04/07/2022  -     Comprehensive Metabolic Panel; Future; Expected date: 04/07/2022  -     TSH; Future; Expected date: 04/07/2022    Hypothyroidism, unspecified type  -     CBC Auto Differential; Future; Expected date: 04/07/2022  -     Comprehensive Metabolic Panel; Future; Expected date: 04/07/2022  -     TSH; Future; Expected date: 04/07/2022    Malignant neoplasm of overlapping sites of right breast in female, estrogen receptor positive  -     CBC Auto Differential; Future; Expected date: 04/07/2022  -     Comprehensive Metabolic Panel; Future;  Expected date: 04/07/2022  -     TSH; Future; Expected date: 04/07/2022    Secondary cancer of bone  -     CBC Auto Differential; Future; Expected date: 04/07/2022  -     Comprehensive Metabolic Panel; Future; Expected date: 04/07/2022  -     TSH; Future; Expected date: 04/07/2022    Papillary thyroid carcinoma  -     CBC Auto Differential; Future; Expected date: 04/07/2022  -     Comprehensive Metabolic Panel; Future; Expected date: 04/07/2022  -     TSH; Future; Expected date: 04/07/2022    Tobacco abuse disorder  -     CBC Auto Differential; Future; Expected date: 04/07/2022  -     Comprehensive Metabolic Panel; Future; Expected date: 04/07/2022  -     TSH; Future; Expected date: 04/07/2022      Consult Start Time: 04/07/2022 11:40 CDT  Consult End Time: 04/07/2022 12:00 CDT      The patient location is: home  The chief complaint leading to consultation is: recurrent ovarian    Visit type: audiovisual    Face to Face time with patient: 20 minutes  20 minutes of total time spent on the encounter, which includes face to face time and non-face to face time preparing to see the patient (eg, review of tests), Obtaining and/or reviewing separately obtained history, Documenting clinical information in the electronic or other health record, Independently interpreting results (not separately reported) and communicating results to the patient/family/caregiver, or Care coordination (not separately reported).       Each patient to whom he or she provides medical services by telemedicine is:  (1) informed of the relationship between the physician and patient and the respective role of any other health care provider with respect to management of the patient; and (2) notified that he or she may decline to receive medical services by telemedicine and may withdraw from such care at any time.      Devante Moseley MD

## 2022-04-07 NOTE — ASSESSMENT & PLAN NOTE
Metastatic squamous cell carcinoma of the cervix. Status post prior lines of treatment and currently on chemo immunotherapy due to recent follicle progression/recurrence noted in PET scan from January of 2022.     She was started on Keytruda plus carboplatin and Taxol with plan to re-stage after 3 cycles.  Restaging PET scan from 03/25/2022 showed persistent cervical uptake with mild decrease in FDG avidity, interestingly there were new mildly FDG avid lymph nodes in the right inguinal region and additional area of soft tissue density in the posterior subcutaneous soft tissue in the medial right upper thigh.     Initial plan was that if there was no evidence of disease progression after 3 cycles, may consider local therapy in the cervix, however given results of most recent PET scan, with suspicious right inguinal adenopathy, I would recommend continuing systemic therapy at this time and re-stage after 3 or 4 more cycles.     Discussed with Dr. Vázquez at [facility] who agreed to continue systemic therapy at this time.     I have reviewed labs today, no concerning cytopenia, will proceed with cycle 4 of chemo (patient claimed that primary oncologist discontinued immunotherapy due to acquired hypothyroidism).  Return in 3 weeks with repeat labs or sooner if needed.

## 2022-04-07 NOTE — ASSESSMENT & PLAN NOTE
on zoledronic acid dosed every 12 weeks. - Last zometa 1/18/22; next planned 4/12/22 (will give today, 04/07/2022)  DEXA 8/2021 without bone loss   Recent PET scan January 2022 without evidence of new bony metastatic disease.  She feels most comfortable with maintenance q.3 months which is reasonable. Continue calcium vitamin-D supplementation.

## 2022-04-08 ENCOUNTER — TELEPHONE (OUTPATIENT)
Dept: HEMATOLOGY/ONCOLOGY | Facility: CLINIC | Age: 52
End: 2022-04-08
Payer: MEDICAID

## 2022-04-08 DIAGNOSIS — C77.3 MALIGNANT NEOPLASM METASTATIC TO LYMPH NODE OF AXILLA: Primary | ICD-10-CM

## 2022-04-08 NOTE — TELEPHONE ENCOUNTER
Pt duane Rodriguezry called in to say that pt overslept this morning and therefore missed her chemo appt  He stated that he and the pt had several questions that they would like cleared up before getting chemo   He stated that the pt was to have radiation  4/20/22 with Dr. Vázquez and that Dr. Vázquez told them that the pt did not have progression , but that the lymph node that was highlighted on the PET scan was due to her Immunotherapy working. They were told by oncology that she did have progression (cancer) in that area and therefore would not be getting radiation right now, but proceeding with cycle 4 of chemo minus the immunotherapy because of her TSH elevation from the Keytruda. He also stated the pt was currently on antibiotics for a UTI and was not sure she should get chemo right now anyway.  I explained to the pt that I needed to confer with dR. Moseley as he is who saw the patient after her PET scan and ordered further chemo minus the Keytruda due to TSH elevation.   I have spoken with Dr. Moseley (who spoke with dr Vázquez again today) and the consensus is that pt will need cycle 4 of chemo minus immunotherapy asap and will also have internal radiation scheduled for 4/25/22 with Dr. Vázquez. ( spoke with nurse Brittani at Dr. Vázquez's office and she had Dr. Vázquez call Dr. Moseley) Dr Moseley secure chatted to me that pt should proceed with chemo asap and will have radiation as well.     I have spoken to the infusion nurse Fatoumata and the pt will have lab tomorrow at [facility] and then will have chemo on MOnday 4/11/22    Above communication shared with pt chi Mccallum and he stated understanding and thanked me for the clarification.   Pt will attend.

## 2022-04-08 NOTE — TELEPHONE ENCOUNTER
"Noted that pt cancelled her chemo that was scheduled for today per her my chart with reason 'other"    LM for pt to call me back regarding above.   "

## 2022-04-09 ENCOUNTER — LAB VISIT (OUTPATIENT)
Dept: LAB | Facility: HOSPITAL | Age: 52
End: 2022-04-09
Attending: FAMILY MEDICINE
Payer: MEDICAID

## 2022-04-09 DIAGNOSIS — C53.0 MALIGNANT NEOPLASM OF ENDOCERVIX: ICD-10-CM

## 2022-04-09 LAB
ALBUMIN SERPL BCP-MCNC: 3.9 G/DL (ref 3.5–5.2)
ALP SERPL-CCNC: 70 U/L (ref 55–135)
ALT SERPL W/O P-5'-P-CCNC: 19 U/L (ref 10–44)
ANION GAP SERPL CALC-SCNC: 11 MMOL/L (ref 8–16)
ANISOCYTOSIS BLD QL SMEAR: SLIGHT
AST SERPL-CCNC: 17 U/L (ref 10–40)
BASOPHILS # BLD AUTO: 0.03 K/UL (ref 0–0.2)
BASOPHILS NFR BLD: 0.4 % (ref 0–1.9)
BILIRUB SERPL-MCNC: 0.6 MG/DL (ref 0.1–1)
BUN SERPL-MCNC: 15 MG/DL (ref 6–20)
CALCIUM SERPL-MCNC: 9.7 MG/DL (ref 8.7–10.5)
CHLORIDE SERPL-SCNC: 105 MMOL/L (ref 95–110)
CO2 SERPL-SCNC: 25 MMOL/L (ref 23–29)
CREAT SERPL-MCNC: 1.3 MG/DL (ref 0.5–1.4)
DIFFERENTIAL METHOD: ABNORMAL
EOSINOPHIL # BLD AUTO: 0.6 K/UL (ref 0–0.5)
EOSINOPHIL NFR BLD: 8.4 % (ref 0–8)
ERYTHROCYTE [DISTWIDTH] IN BLOOD BY AUTOMATED COUNT: 16.9 % (ref 11.5–14.5)
EST. GFR  (AFRICAN AMERICAN): 54 ML/MIN/1.73 M^2
EST. GFR  (NON AFRICAN AMERICAN): 47 ML/MIN/1.73 M^2
GLUCOSE SERPL-MCNC: 121 MG/DL (ref 70–110)
HCT VFR BLD AUTO: 35.7 % (ref 37–48.5)
HGB BLD-MCNC: 11.4 G/DL (ref 12–16)
IMM GRANULOCYTES # BLD AUTO: 0.1 K/UL (ref 0–0.04)
IMM GRANULOCYTES NFR BLD AUTO: 1.4 % (ref 0–0.5)
LYMPHOCYTES # BLD AUTO: 2.2 K/UL (ref 1–4.8)
LYMPHOCYTES NFR BLD: 31.6 % (ref 18–48)
MCH RBC QN AUTO: 33.4 PG (ref 27–31)
MCHC RBC AUTO-ENTMCNC: 31.9 G/DL (ref 32–36)
MCV RBC AUTO: 105 FL (ref 82–98)
MONOCYTES # BLD AUTO: 1.1 K/UL (ref 0.3–1)
MONOCYTES NFR BLD: 16.3 % (ref 4–15)
NEUTROPHILS # BLD AUTO: 2.9 K/UL (ref 1.8–7.7)
NEUTROPHILS NFR BLD: 41.9 % (ref 38–73)
NRBC BLD-RTO: 0 /100 WBC
PLATELET # BLD AUTO: 214 K/UL (ref 150–450)
PLATELET BLD QL SMEAR: ABNORMAL
PMV BLD AUTO: 10.1 FL (ref 9.2–12.9)
POIKILOCYTOSIS BLD QL SMEAR: SLIGHT
POTASSIUM SERPL-SCNC: 4.7 MMOL/L (ref 3.5–5.1)
PROT SERPL-MCNC: 7.6 G/DL (ref 6–8.4)
RBC # BLD AUTO: 3.41 M/UL (ref 4–5.4)
SODIUM SERPL-SCNC: 141 MMOL/L (ref 136–145)
WBC # BLD AUTO: 6.94 K/UL (ref 3.9–12.7)

## 2022-04-09 PROCEDURE — 36415 COLL VENOUS BLD VENIPUNCTURE: CPT | Performed by: INTERNAL MEDICINE

## 2022-04-09 PROCEDURE — 85025 COMPLETE CBC W/AUTO DIFF WBC: CPT | Performed by: INTERNAL MEDICINE

## 2022-04-09 PROCEDURE — 80053 COMPREHEN METABOLIC PANEL: CPT | Performed by: INTERNAL MEDICINE

## 2022-04-11 ENCOUNTER — INFUSION (OUTPATIENT)
Dept: INFUSION THERAPY | Facility: HOSPITAL | Age: 52
End: 2022-04-11
Attending: INTERNAL MEDICINE
Payer: MEDICAID

## 2022-04-11 VITALS
DIASTOLIC BLOOD PRESSURE: 72 MMHG | OXYGEN SATURATION: 97 % | TEMPERATURE: 98 F | RESPIRATION RATE: 18 BRPM | SYSTOLIC BLOOD PRESSURE: 114 MMHG | HEART RATE: 76 BPM

## 2022-04-11 DIAGNOSIS — C53.0 MALIGNANT NEOPLASM OF ENDOCERVIX: Primary | ICD-10-CM

## 2022-04-11 PROCEDURE — 63600175 PHARM REV CODE 636 W HCPCS: Performed by: INTERNAL MEDICINE

## 2022-04-11 PROCEDURE — 96415 CHEMO IV INFUSION ADDL HR: CPT

## 2022-04-11 PROCEDURE — 96367 TX/PROPH/DG ADDL SEQ IV INF: CPT

## 2022-04-11 PROCEDURE — 96417 CHEMO IV INFUS EACH ADDL SEQ: CPT

## 2022-04-11 PROCEDURE — 25000003 PHARM REV CODE 250: Performed by: INTERNAL MEDICINE

## 2022-04-11 PROCEDURE — 96375 TX/PRO/DX INJ NEW DRUG ADDON: CPT

## 2022-04-11 PROCEDURE — 96413 CHEMO IV INFUSION 1 HR: CPT

## 2022-04-11 RX ORDER — SODIUM CHLORIDE 9 MG/ML
INJECTION, SOLUTION INTRAVENOUS
Status: COMPLETED | OUTPATIENT
Start: 2022-04-11 | End: 2022-04-11

## 2022-04-11 RX ORDER — EPINEPHRINE 1 MG/ML
0.3 INJECTION, SOLUTION INTRACARDIAC; INTRAMUSCULAR; INTRAVENOUS; SUBCUTANEOUS ONCE AS NEEDED
Status: DISCONTINUED | OUTPATIENT
Start: 2022-04-11 | End: 2022-04-11 | Stop reason: HOSPADM

## 2022-04-11 RX ORDER — HEPARIN 100 UNIT/ML
500 SYRINGE INTRAVENOUS
Status: DISCONTINUED | OUTPATIENT
Start: 2022-04-11 | End: 2022-04-11 | Stop reason: HOSPADM

## 2022-04-11 RX ORDER — DIPHENHYDRAMINE HYDROCHLORIDE 50 MG/ML
50 INJECTION INTRAMUSCULAR; INTRAVENOUS ONCE AS NEEDED
Status: DISCONTINUED | OUTPATIENT
Start: 2022-04-11 | End: 2022-04-11 | Stop reason: HOSPADM

## 2022-04-11 RX ORDER — FAMOTIDINE 10 MG/ML
20 INJECTION INTRAVENOUS
Status: COMPLETED | OUTPATIENT
Start: 2022-04-11 | End: 2022-04-11

## 2022-04-11 RX ADMIN — HEPARIN 500 UNITS: 100 SYRINGE at 02:04

## 2022-04-11 RX ADMIN — APREPITANT 130 MG: 130 INJECTION, EMULSION INTRAVENOUS at 10:04

## 2022-04-11 RX ADMIN — PALONOSETRON HYDROCHLORIDE 0.25 MG: 0.25 INJECTION, SOLUTION INTRAVENOUS at 09:04

## 2022-04-11 RX ADMIN — PACLITAXEL 366 MG: 6 INJECTION, SOLUTION INTRAVENOUS at 10:04

## 2022-04-11 RX ADMIN — SODIUM CHLORIDE: 0.9 INJECTION, SOLUTION INTRAVENOUS at 09:04

## 2022-04-11 RX ADMIN — FAMOTIDINE 20 MG: 10 INJECTION, SOLUTION INTRAVENOUS at 10:04

## 2022-04-11 RX ADMIN — DIPHENHYDRAMINE HYDROCHLORIDE 50 MG: 50 INJECTION INTRAMUSCULAR; INTRAVENOUS at 10:04

## 2022-04-11 RX ADMIN — CARBOPLATIN 495 MG: 10 INJECTION, SOLUTION INTRAVENOUS at 01:04

## 2022-04-11 NOTE — PLAN OF CARE
Problem: Adult Inpatient Plan of Care  Goal: Plan of Care Review  Outcome: Ongoing, Progressing  Flowsheets (Taken 4/11/2022 0923)  Plan of Care Reviewed With:   patient   spouse  Goal: Patient-Specific Goal (Individualized)  Outcome: Ongoing, Progressing  Flowsheets (Taken 4/11/2022 0923)  Anxieties, Fears or Concerns: Patient concerned about recent doctor interaction regarding her cancer diagnosis. Pt anticipating starting radiation. Pt denies need to speak to social work.  Individualized Care Needs: Reclined position, warm blanket,  at chairside  Patient-Specific Goals (Include Timeframe): Tolerate chemotherapy today  Goal: Optimal Comfort and Wellbeing  Outcome: Ongoing, Progressing  Intervention: Provide Person-Centered Care  Flowsheets (Taken 4/11/2022 0923)  Trust Relationship/Rapport:   care explained   reassurance provided   choices provided   thoughts/feelings acknowledged   emotional support provided   empathic listening provided   questions answered   questions encouraged     Problem: Neutropenia (Chemotherapy Effects)  Goal: Absence of Infection  Outcome: Ongoing, Progressing  Intervention: Prevent Infection and Maximize Resistance  Flowsheets (Taken 4/11/2022 0923)  Infection Prevention:   hand hygiene promoted   equipment surfaces disinfected   personal protective equipment utilized   rest/sleep promoted

## 2022-04-14 ENCOUNTER — TELEPHONE (OUTPATIENT)
Dept: GYNECOLOGIC ONCOLOGY | Facility: CLINIC | Age: 52
End: 2022-04-14
Payer: MEDICAID

## 2022-04-14 ENCOUNTER — TELEPHONE (OUTPATIENT)
Dept: PULMONOLOGY | Facility: CLINIC | Age: 52
End: 2022-04-14
Payer: MEDICAID

## 2022-04-14 DIAGNOSIS — J44.89 COPD WITH ASTHMA: Primary | ICD-10-CM

## 2022-04-14 NOTE — TELEPHONE ENCOUNTER
Left voicemail to call and schedule a follow up in May in [place] ----- Message from Yousif Desouza MD sent at 4/14/2022  4:11 PM CDT -----  Can we please schedule a RONC in  in May?  Thanks.

## 2022-04-18 ENCOUNTER — CLINICAL SUPPORT (OUTPATIENT)
Dept: PULMONOLOGY | Facility: CLINIC | Age: 52
End: 2022-04-18
Payer: MEDICAID

## 2022-04-18 ENCOUNTER — HOSPITAL ENCOUNTER (OUTPATIENT)
Dept: RADIOLOGY | Facility: HOSPITAL | Age: 52
Discharge: HOME | End: 2022-04-18
Attending: PHYSICIAN ASSISTANT
Payer: MEDICAID

## 2022-04-18 ENCOUNTER — TELEPHONE (OUTPATIENT)
Dept: GYNECOLOGIC ONCOLOGY | Facility: CLINIC | Age: 52
End: 2022-04-18
Payer: MEDICAID

## 2022-04-18 ENCOUNTER — PATIENT MESSAGE (OUTPATIENT)
Dept: ADMINISTRATIVE | Facility: OTHER | Age: 52
End: 2022-04-18
Payer: MEDICAID

## 2022-04-18 VITALS — BODY MASS INDEX: 31.28 KG/M2 | HEIGHT: 67 IN | WEIGHT: 199.31 LBS

## 2022-04-18 DIAGNOSIS — J44.89 COPD WITH ASTHMA: ICD-10-CM

## 2022-04-18 LAB
ALLENS TEST: ABNORMAL
BRPFT: NORMAL
DELSYS: ABNORMAL
FEF 25 75 LLN: 1.49
FEF 25 75 PRE REF: 77.5 %
FEF 25 75 REF: 2.74
FEV1 FVC LLN: 68
FEV1 FVC PRE REF: 95.4 %
FEV1 FVC REF: 80
FEV1 LLN: 2.27
FEV1 PRE REF: 86 %
FEV1 REF: 2.94
FIO2: 21
FVC LLN: 2.88
FVC PRE REF: 89.5 %
FVC REF: 3.72
HCO3 UR-SCNC: 22.9 MMOL/L (ref 24–28)
MODE: ABNORMAL
PCO2 BLDA: 30.5 MMHG (ref 35–45)
PEF LLN: 5.21
PEF PRE REF: 90.8 %
PEF REF: 7.09
PH SMN: 7.48 [PH] (ref 7.35–7.45)
PO2 BLDA: 103 MMHG (ref 80–100)
POC BE: -1 MMOL/L
POC SATURATED O2: 98 % (ref 95–100)
PRE FEF 25 75: 2.12 L/S
PRE FET 100: 7.08 SEC
PRE FEV1 FVC: 76.06 %
PRE FEV1: 2.53 L
PRE FVC: 3.33 L
PRE PEF: 6.43 L/S
SAMPLE: ABNORMAL
SITE: ABNORMAL

## 2022-04-18 PROCEDURE — 82803 BLOOD GASES ANY COMBINATION: CPT | Mod: PBBFAC

## 2022-04-18 PROCEDURE — 94010 BREATHING CAPACITY TEST: CPT | Mod: PBBFAC

## 2022-04-18 PROCEDURE — 99999 PR PBB SHADOW E&M-EST. PATIENT-LVL I: ICD-10-PCS | Mod: PBBFAC,,,

## 2022-04-18 PROCEDURE — 94010 BREATHING CAPACITY TEST: CPT | Mod: 26,59,S$PBB, | Performed by: INTERNAL MEDICINE

## 2022-04-18 PROCEDURE — 94618 PULMONARY STRESS TESTING: CPT | Mod: PBBFAC

## 2022-04-18 PROCEDURE — 94618 PULMONARY STRESS TESTING: CPT | Mod: 26,S$PBB,, | Performed by: INTERNAL MEDICINE

## 2022-04-18 PROCEDURE — 99999 PR PBB SHADOW E&M-EST. PATIENT-LVL I: CPT | Mod: PBBFAC,,,

## 2022-04-18 PROCEDURE — 71046 X-RAY EXAM CHEST 2 VIEWS: CPT | Mod: TC

## 2022-04-18 PROCEDURE — 99211 OFF/OP EST MAY X REQ PHY/QHP: CPT | Mod: PBBFAC,25

## 2022-04-18 PROCEDURE — 36600 WITHDRAWAL OF ARTERIAL BLOOD: CPT | Mod: PBBFAC

## 2022-04-18 PROCEDURE — 94618 PULMONARY STRESS TESTING: ICD-10-PCS | Mod: 26,S$PBB,, | Performed by: INTERNAL MEDICINE

## 2022-04-18 PROCEDURE — 71046 X-RAY EXAM CHEST 2 VIEWS: CPT | Mod: 26,,, | Performed by: RADIOLOGY

## 2022-04-18 PROCEDURE — 36600 WITHDRAWAL OF ARTERIAL BLOOD: CPT | Mod: 59,S$PBB,, | Performed by: INTERNAL MEDICINE

## 2022-04-18 PROCEDURE — 94010 BREATHING CAPACITY TEST: ICD-10-PCS | Mod: 26,59,S$PBB, | Performed by: INTERNAL MEDICINE

## 2022-04-18 PROCEDURE — 71046 XR CHEST PA AND LATERAL: ICD-10-PCS | Mod: 26,,, | Performed by: RADIOLOGY

## 2022-04-18 PROCEDURE — 36600 PR WITHDRAWAL OF ARTERIAL BLOOD: ICD-10-PCS | Mod: 59,S$PBB,, | Performed by: INTERNAL MEDICINE

## 2022-04-18 NOTE — PROCEDURES
"[facility] - Pulmonary Function  Six Minute Walk     SUMMARY     Ordering Provider: PRASANNA Mendiola   Interpreting Provider: Dr. Hoover  Performing nurse/tech/RT: OZZIE Yang RRT  Diagnosis: COPD (with asthma)  Height: 5' 7" (170.2 cm)  Weight: 90.4 kg (199 lb 4.7 oz)  BMI (Calculated): 31.2   Patient Race:             Phase Oxygen Assessment Supplemental O2 Heart   Rate Blood Pressure Osmar Dyspnea Scale Rating   Resting 99 % Room Air 81 bpm 123/58 0   Exercise        Minute        1 98 % Room Air 110 bpm     2 98 % Room Air 116 bpm     3 98 % Room Air 121 bpm     4 99 % Room Air 125 bpm     5 99 % Room Air 126 bpm     6  99 % Room Air 126 bpm 136/63 0   Recovery        Minute        1 99 % Room Air 111 bpm     2 99 % Room Air 97 bpm     3 99 % Room Air 92 bpm     4 99 % Room Air 90 bpm 121/60 0     Six Minute Walk Summary  6MWT Status: completed without stopping  Patient Reported: No complaints     Interpretation:  Did the patient stop or pause?: No         Total Time Walked (Calculated): 360 seconds  Final Partial Lap Distance (feet): 25 feet  Total Distance Meters (Calculated): 312.42 meters  Predicted Distance Meters (Calculated): 512.77 meters  Percentage of Predicted (Calculated): 60.93  Peak VO2 (Calculated): 13.35  Mets: 3.81  Has The Patient Had a Previous Six Minute Walk Test?: No       Previous 6MWT Results  Has The Patient Had a Previous Six Minute Walk Test?: No         CLINICAL INTERPRETATION:  Six minute walk distance is 312.42m (60.93 % predicted) with no dyspnea.  During exercise, there was no significant desaturation while breathing room air.  Blood pressure remained stable and Heart rate increased significantly with walking.  This may represent a tachycardic response to exercise.  The patient did not report non-pulmonary symptoms during exercise.  The patient did complete the study, walking 360 seconds of the 360 second test.  No previous study performed.  Based upon age and body mass index, " exercise capacity is less than predicted.      [] Mild exercise-induced hypoxemia described as an arterial oxygen saturation of 93-95% (or 3-4% less than at rest)  []  Moderate exercise-induced hypoxemia as 89-93%  []  Severe exercise induced hypoxemia as < 89% O2 saturation.  Medicare Criteria for oxygen prescription comments:   []  When arterial oxygen saturation is at or below 88% during exercise (severe exercise induced hypoxemia) then the patient falls under Medicare Group 1 criteria for supplemental oxygen

## 2022-04-18 NOTE — PROCEDURES
See ABG results.    Acute (uncompensated) primary respiratory alkalosis    pH > 7.48 and HCO3 > 22, for acute (uncompensated)  pH < 7.42 and HCO3 < 19, for chronic (compensated)    expected pH = 7.49  expected CO2 = 30  expected HCO3- = 22

## 2022-04-18 NOTE — TELEPHONE ENCOUNTER
Left another voicemail no answer nor call back.  ----- Message from Yousif Desouza MD sent at 4/14/2022  4:11 PM CDT -----  Can we please schedule a RON in  in May?  Thanks.

## 2022-04-21 ENCOUNTER — TELEPHONE (OUTPATIENT)
Dept: GYNECOLOGIC ONCOLOGY | Facility: CLINIC | Age: 52
End: 2022-04-21
Payer: MEDICAID

## 2022-04-21 NOTE — TELEPHONE ENCOUNTER
called several times no answer  left voicemail to call back to schedule  with Dr. Desouza. appointment schedule and mail could not get the patient on the phone to confirm ----- Message from Vladimir Silverio MA sent at 4/18/2022  2:00 PM CDT -----  Left another voicemail no answer nor call back.    ----- Message -----  From: Yousif Desouza MD  Sent: 4/14/2022   4:11 PM CDT  To: Vladimir Silverio MA, #    Can we please schedule a RONC in  in May?  Thanks.

## 2022-04-22 ENCOUNTER — OFFICE VISIT (OUTPATIENT)
Dept: PULMONOLOGY | Facility: CLINIC | Age: 52
End: 2022-04-22
Payer: MEDICAID

## 2022-04-22 VITALS
RESPIRATION RATE: 14 BRPM | HEART RATE: 102 BPM | BODY MASS INDEX: 31.42 KG/M2 | WEIGHT: 200.19 LBS | DIASTOLIC BLOOD PRESSURE: 78 MMHG | HEIGHT: 67 IN | OXYGEN SATURATION: 99 % | SYSTOLIC BLOOD PRESSURE: 118 MMHG

## 2022-04-22 DIAGNOSIS — J44.89 COPD WITH ASTHMA: Primary | ICD-10-CM

## 2022-04-22 DIAGNOSIS — Z72.0 TOBACCO ABUSE DISORDER: ICD-10-CM

## 2022-04-22 DIAGNOSIS — C53.0 MALIGNANT NEOPLASM OF ENDOCERVIX: ICD-10-CM

## 2022-04-22 PROCEDURE — 3078F DIAST BP <80 MM HG: CPT | Mod: CPTII,,, | Performed by: PHYSICIAN ASSISTANT

## 2022-04-22 PROCEDURE — 3008F PR BODY MASS INDEX (BMI) DOCUMENTED: ICD-10-PCS | Mod: CPTII,,, | Performed by: PHYSICIAN ASSISTANT

## 2022-04-22 PROCEDURE — 3078F PR MOST RECENT DIASTOLIC BLOOD PRESSURE < 80 MM HG: ICD-10-PCS | Mod: CPTII,,, | Performed by: PHYSICIAN ASSISTANT

## 2022-04-22 PROCEDURE — 1160F RVW MEDS BY RX/DR IN RCRD: CPT | Mod: CPTII,,, | Performed by: PHYSICIAN ASSISTANT

## 2022-04-22 PROCEDURE — 3008F BODY MASS INDEX DOCD: CPT | Mod: CPTII,,, | Performed by: PHYSICIAN ASSISTANT

## 2022-04-22 PROCEDURE — 99214 PR OFFICE/OUTPT VISIT, EST, LEVL IV, 30-39 MIN: ICD-10-PCS | Mod: S$PBB,,, | Performed by: PHYSICIAN ASSISTANT

## 2022-04-22 PROCEDURE — 1160F PR REVIEW ALL MEDS BY PRESCRIBER/CLIN PHARMACIST DOCUMENTED: ICD-10-PCS | Mod: CPTII,,, | Performed by: PHYSICIAN ASSISTANT

## 2022-04-22 PROCEDURE — 99214 OFFICE O/P EST MOD 30 MIN: CPT | Mod: S$PBB,,, | Performed by: PHYSICIAN ASSISTANT

## 2022-04-22 PROCEDURE — 1159F MED LIST DOCD IN RCRD: CPT | Mod: CPTII,,, | Performed by: PHYSICIAN ASSISTANT

## 2022-04-22 PROCEDURE — 99215 OFFICE O/P EST HI 40 MIN: CPT | Mod: PBBFAC | Performed by: PHYSICIAN ASSISTANT

## 2022-04-22 PROCEDURE — 3074F SYST BP LT 130 MM HG: CPT | Mod: CPTII,,, | Performed by: PHYSICIAN ASSISTANT

## 2022-04-22 PROCEDURE — 99999 PR PBB SHADOW E&M-EST. PATIENT-LVL V: CPT | Mod: PBBFAC,,, | Performed by: PHYSICIAN ASSISTANT

## 2022-04-22 PROCEDURE — 1159F PR MEDICATION LIST DOCUMENTED IN MEDICAL RECORD: ICD-10-PCS | Mod: CPTII,,, | Performed by: PHYSICIAN ASSISTANT

## 2022-04-22 PROCEDURE — 99999 PR PBB SHADOW E&M-EST. PATIENT-LVL V: ICD-10-PCS | Mod: PBBFAC,,, | Performed by: PHYSICIAN ASSISTANT

## 2022-04-22 PROCEDURE — 3074F PR MOST RECENT SYSTOLIC BLOOD PRESSURE < 130 MM HG: ICD-10-PCS | Mod: CPTII,,, | Performed by: PHYSICIAN ASSISTANT

## 2022-04-22 RX ORDER — AMOXICILLIN 875 MG/1
875 TABLET, FILM COATED ORAL 2 TIMES DAILY
COMMUNITY
Start: 2022-04-04 | End: 2022-08-11

## 2022-04-22 RX ORDER — NITROFURANTOIN 25; 75 MG/1; MG/1
100 CAPSULE ORAL 2 TIMES DAILY
COMMUNITY
Start: 2022-04-01 | End: 2022-09-23

## 2022-04-22 RX ORDER — PHENAZOPYRIDINE HYDROCHLORIDE 200 MG/1
200 TABLET, FILM COATED ORAL 3 TIMES DAILY
COMMUNITY
Start: 2022-04-05 | End: 2022-09-23

## 2022-04-22 NOTE — PROGRESS NOTES
Subjective:       Patient ID: Josey Flores is a 51 y.o. female.    Chief Complaint: COPD and Asthma      52yo female here for pulmonary risk assessment visit  Radiation implant for cervical cancer treatment scheduled 4/22/22  Dr. Vázquez at [facility]  Sees Dr. Rashid for COPD/asthma, controlled on albuterol prn  Current smoker  No recent respiratory infections  Declines pneumonia vaccine today    Last visit with Dr. Rashid 10/2021:  Noted on 2/5/2021  for abnormal PET scan.   Heavy smoker, COPD, stage IV invasive breast carcinoma, history of thyroid carcinoma status post thyroidectomy 2017, cervical CA status post LEEP 2017.  Abnormal PET scan showing mediastinal right paratracheal and subcarinal lymph node with avidity.Station 7 EBUS TBNA biopsy showed malignancy in favor of cervical CA origin.      COPD Questionnaire  How often do you cough?: A little of the time  How often do you have phlegm (mucus) in your chest?: Never  How often does your chest feel tight?: Never  When you walk up a hill or one flight of stairs, how often are you breathless?: All of the time  How often are you limited doing any activities at home?: Some of the time  How often are you confident leaving the house despite your lung condition?: All of the time  How often do you sleep soundly?: Some of the time  How often do you have energy?: Never  Total score: 17    Asthma Control Test  In the past 4  weeks, how much of the time did your asthma keep you from getting as much done at work, school or at home?: None of the time  During the past 4 weeks, how often have you had shortness of breath?: Not at all  During the past 4 weeks, how often did your asthma symptoms (wheezing, couging, shortness of breath, chest tightness or pain) wake you up at night or earlier than usual in the morning?: Not at all  During the past 4 weeks, how often have you used your rescue inhaler or nebulizer medication (such as albuterol)?: Not at all  How would you rate your  asthma control during the past 4 weeks?: Completely controlled  If your score is 19 or less, your asthma may not be under control: 25       Immunization History   Administered Date(s) Administered    COVID-19, MRNA, LN-S, PF (MODERNA FULL 0.5 ML DOSE) 03/11/2021, 04/09/2021    Influenza - Quadrivalent - PF *Preferred* (6 months and older) 10/11/2017, 10/02/2018, 09/26/2019, 10/27/2020      Tobacco Use: High Risk    Smoking Tobacco Use: Current Every Day Smoker    Smokeless Tobacco Use: Never Used      Past Medical History:   Diagnosis Date    Anxiety     Asthma     Breast cancer 2017    Cancer     right breast, metastatic-lymph nodes, bone, and thyroid     COPD (chronic obstructive pulmonary disease)     Depression     History of psychiatric hospitalization     2000; suicidal ideation    Hx of psychiatric care     Hypothyroidism     Miscarriage     five miscarriages    Obesity     Psychiatric problem     Thyroid cancer 2017      Current Outpatient Medications on File Prior to Visit   Medication Sig Dispense Refill    ALPRAZolam (XANAX) 1 MG tablet Take 0.5-1 tablets (0.5-1 mg total) by mouth 2 (two) times daily as needed for Anxiety. 60 tablet 0    amoxicillin (AMOXIL) 875 MG tablet Take 875 mg by mouth 2 (two) times daily.      buPROPion (WELLBUTRIN SR) 150 MG TBSR 12 hr tablet Take 1 tablet (150 mg total) by mouth 2 (two) times daily. Take 1 tablet (150mg) once daily for 1 week then increase to twice daily 60 tablet 0    calcium carbonate 400 mg calcium (1,000 mg) Chew Take 2,000 mg by mouth once daily.      cyanocobalamin (VITAMIN B-12) 1000 MCG tablet Take 1 tablet (1,000 mcg total) by mouth once daily. 90 tablet 3    diphenoxylate-atropine 2.5-0.025 mg (LOMOTIL) 2.5-0.025 mg per tablet Take 1 tablet by mouth 4 (four) times daily as needed for Diarrhea. 30 tablet 1    ergocalciferol (VITAMIN D2) 50,000 unit Cap Take 5,000 Units by mouth once daily at 6am.       HYDROcodone-acetaminophen  (NORCO)  mg per tablet Take 1 tablet by mouth every 4 (four) hours as needed for Pain. No more than 4 doses/ day 180 tablet 0    hydrOXYzine HCL (ATARAX) 25 MG tablet Take 1 tablet (25 mg total) by mouth 3 (three) times daily as needed for Itching. 90 tablet 1    letrozole (FEMARA) 2.5 mg Tab Take 1 tablet (2.5 mg) by mouth once daily. 90 tablet 3    levothyroxine (EUTHYROX) 200 MCG tablet Take 1 tablet (200 mcg total) by mouth once daily. 30 tablet 2    levothyroxine (SYNTHROID) 50 MCG tablet Take 1 tablet (50 mcg total) by mouth once daily. 30 tablet 1    morphine (MS CONTIN) 30 MG 12 hr tablet Take 1 tablet (30 mg total) by mouth every 12 (twelve) hours. 60 tablet 0    multivitamin (THERAGRAN) per tablet Take 1 tablet by mouth once daily.      ondansetron (ZOFRAN-ODT) 8 MG TbDL Take 1 tablet (8 mg total) by mouth every 8 (eight) hours as needed (nausea/vomiting). 60 tablet 5    phenazopyridine (PYRIDIUM) 200 MG tablet Take 200 mg by mouth 3 (three) times daily.      potassium chloride SA (K-DUR,KLOR-CON) 20 MEQ tablet Take 1 tablet (20 mEq total) by mouth once daily. Take daily for 3 days. 3 tablet 1    albuterol (PROVENTIL) 2.5 mg /3 mL (0.083 %) nebulizer solution Take 3 mLs (2.5 mg total) by nebulization every 4 to 6 hours as needed for Wheezing or Shortness of Breath. (Patient not taking: Reported on 4/22/2022) 360 mL 11    albuterol (PROVENTIL/VENTOLIN HFA) 90 mcg/actuation inhaler Inhale 2 puffs into the lungs every 4 (four) hours as needed for Wheezing or Shortness of Breath. (Patient not taking: Reported on 4/22/2022) 18 g 11    dexAMETHasone (DECADRON) 4 MG Tab Take 2 tablets (8 mg total) by mouth once daily. Take as directed on days 2, 3, and 4 of your chemotherapy cycle. (Patient not taking: Reported on 4/22/2022) 24 tablet 0    ipratropium (ATROVENT) 42 mcg (0.06 %) nasal spray 2 sprays by Nasal route 4 (four) times daily. As needed for rhinitis. Take in evening before bed and in  "the morning (Patient not taking: Reported on 4/22/2022) 15 mL 11    magnesium oxide (MAGOX) 400 mg (241.3 mg magnesium) tablet Take 1 tablet (400 mg total) by mouth once daily. (Patient not taking: Reported on 4/22/2022) 5 tablet 1    nitrofurantoin, macrocrystal-monohydrate, (MACROBID) 100 MG capsule Take 100 mg by mouth 2 (two) times daily.      OLANZapine (ZYPREXA) 5 MG tablet Take 1 tablet (5 mg total) by mouth every evening. Take as directed on days 1-4 of your chemotherapy cycle. May increase to 10 mg if breakthrough nausea occurs. (Patient not taking: Reported on 4/22/2022) 30 tablet 2    palbociclib (IBRANCE) 125 mg Cap Take one capsule (125 mg) by mouth once daily on days 1-21 of each 28-day cycle. (Patient not taking: Reported on 4/22/2022.) 21 capsule 11     No current facility-administered medications on file prior to visit.        Review of Systems   Constitutional: Positive for fatigue. Negative for fever, weight loss, appetite change and weakness.   HENT: Negative for postnasal drip, rhinorrhea, sinus pressure, trouble swallowing and congestion.    Respiratory: Positive for dyspnea on extertion. Negative for cough, sputum production, choking, chest tightness, shortness of breath and wheezing.    Cardiovascular: Negative for chest pain and leg swelling.   Musculoskeletal: Positive for arthralgias. Negative for gait problem and joint swelling.   Gastrointestinal: Negative for nausea, vomiting and abdominal pain.   Neurological: Negative for dizziness, weakness and headaches.   All other systems reviewed and are negative.      Objective:       Vitals:    04/22/22 1505   BP: 118/78   Pulse: 102   Resp: 14   SpO2: 99%   Weight: 90.8 kg (200 lb 2.8 oz)   Height: 5' 7" (1.702 m)       Physical Exam   Constitutional: She is oriented to person, place, and time. She appears well-developed and well-nourished. No distress.   HENT:   Head: Normocephalic.   Mouth/Throat: Oropharynx is clear and moist. "   Cardiovascular: Normal rate and regular rhythm.   Pulmonary/Chest: Effort normal and breath sounds normal. No respiratory distress. She has no wheezes. She has no rhonchi. She has no rales.   Musculoskeletal:         General: No edema.      Cervical back: Normal range of motion and neck supple.   Lymphadenopathy: No supraclavicular adenopathy is present.     She has no cervical adenopathy.   Neurological: She is alert and oriented to person, place, and time. Gait normal.   Skin: Skin is warm and dry.   Psychiatric: She has a normal mood and affect.   Vitals reviewed.    Personal Diagnostic Review    X-Ray Chest PA And Lateral  Narrative: EXAMINATION:  XR CHEST PA AND LATERAL    CLINICAL HISTORY:  Chronic obstructive pulmonary disease, unspecified    TECHNIQUE:  PA and lateral views of the chest were performed.    COMPARISON:  Prior radiographs    FINDINGS:  Cardiac silhouette and mediastinal contours are unchanged.  Right-sided MediPort remains.  Lungs are clear.  Osseous structures are intact.  Impression: No acute cardiopulmonary process.    Electronically signed by: Colten Llanos MD  Date:    04/18/2022  Time:    09:18      Phase Oxygen Assessment Supplemental O2 Heart   Rate Blood Pressure Osmar Dyspnea Scale Rating   Resting 99 % Room Air 81 bpm 123/58 0   Exercise             Minute             1 98 % Room Air 110 bpm       2 98 % Room Air 116 bpm       3 98 % Room Air 121 bpm       4 99 % Room Air 125 bpm       5 99 % Room Air 126 bpm       6  99 % Room Air 126 bpm 136/63 0   Recovery             Minute             1 99 % Room Air 111 bpm       2 99 % Room Air 97 bpm       3 99 % Room Air 92 bpm       4 99 % Room Air 90 bpm 121/60 0      Six Minute Walk Summary  6MWT Status: completed without stopping  Patient Reported: No complaints             Interpretation:  Did the patient stop or pause?: No  Total Time Walked (Calculated): 360 seconds  Final Partial Lap Distance (feet): 25 feet  Total Distance  Meters (Calculated): 312.42 meters  Predicted Distance Meters (Calculated): 512.77 meters  Percentage of Predicted (Calculated): 60.93  Peak VO2 (Calculated): 13.35  Mets: 3.81  Has The Patient Had a Previous Six Minute Walk Test?: No     Previous 6MWT Results  Has The Patient Had a Previous Six Minute Walk Test?: No           CLINICAL INTERPRETATION:  Six minute walk distance is 312.42m (60.93 % predicted) with no dyspnea.  During exercise, there was no significant desaturation while breathing room air.  Blood pressure remained stable and Heart rate increased significantly with walking.  This may represent a tachycardic response to exercise.  The patient did not report non-pulmonary symptoms during exercise.  The patient did complete the study, walking 360 seconds of the 360 second test.  No previous study performed.  Based upon age and body mass index, exercise capacity is less than predicted.        []? Mild exercise-induced hypoxemia described as an arterial oxygen saturation of 93-95% (or 3-4% less than at rest)  []?  Moderate exercise-induced hypoxemia as 89-93%  []?  Severe exercise induced hypoxemia as < 89% O2 saturation.  Medicare Criteria for oxygen prescription comments:   []?  When arterial oxygen saturation is at or below 88% during exercise (severe exercise induced hypoxemia) then the patient falls under Medicare Group 1 criteria for supplemental oxygen    Component Ref Range & Units 4 d ago    POC PH 7.35 - 7.45 7.483 High     POC PCO2 35 - 45 mmHg 30.5 Low     POC PO2 80 - 100 mmHg 103 High     POC HCO3 24 - 28 mmol/L 22.9 Low     POC BE -2 to 2 mmol/L -1    POC SATURATED O2 95 - 100 % 98    Sample  ARTERIAL    Site  LR    Allens Test  Pass    DelSys  Room Air    Mode  SPONT    FiO2  21    Resulting Agency  [facility]   Acute (uncompensated) primary respiratory alkalosis    Spirometry reviewed. Normal. FEV1 86% of predicted    PRE OPERATIVE PULMONARY RISK ASSESSMENT:      SURGEON: Dr. Vázquez  PROCEDURE:  radiation implant cervical     ANESTHESIA: GA  DURATION: < 1 hour      Patient is a MILD  risk for perioperative pulmonary complications due to:      []            Pulmonary Fibrosis            [x]            COPD                []            NILA  []            Pulmonary vascular disease   []            Pleural disease  []            Pulmonary vasculitis  []            Hypoventilation ( chest wall deformity, neuromuscular disease, obesity etc)  []            Anemia  []            Thyroid disease.  []            Cardiac illess  [x]            General Anesthsia  []            long-acting neuromuscular blockade.     Risks and possible pulmonary complications of surgery include risk of respiratory failure requiring mechanical ventilation, post operative pneumonia, post operative atelectasis, deep venous thrombosis, pulmonary embolism and death.    Based on my assessment , it is okay to proceed with procedure PROVIDED RISK IS ACCEPTABLE TO BOTH THE PATIENT AND THE SURGEON PERFORMING THE SURGERY.     ARISCAT Score 3       0 to 25 points: Low risk: 1.6% pulmonary complication rate   26 to 44 points: Intermediate risk: 13.3% pulmonary complication rate   45 to 123 points: High risk: 42.1% pulmonary complication rate            RECOMMENDATIONS FOR RISK MITIGATION:    1. Preoperative pulmonary workup:  complete  2. Change in medication regimen before surgery: none, continue medication regimen including morning of surgery, with sip of water.  3. Prophylaxis for cardiac events with perioperative beta-blockers: should be considered, specific regimen per anesthesia.  4. Invasive hemodynamic monitoring perioperatively: should be considered.  5. Deep vein thrombosis prophylaxis postoperatively:regimen to be chosen by surgical team.  6. Surveillance for postoperative MI with ECG immediately postoperatively and on postoperative days 1 and 2 AND troponin levels 24 hours postoperatively and on day 4 or hospital discharge (whichever  comes first): should be considered.  7. Begin patient education regarding lung-expansion maneuvers like deep breathing and incentive spirometry.   8. Limit duration of surgery to less than 3 hr if possible.  9. Use spinal or epidural anesthesia if possible.  10. Avoid use of pancuronium or long acting neuromuscular blockers.  11. Use deep-breathing exercises or incentive spirometry.  12. Assure perioperative thromboprophylaxis and adequate post operative analgesia.           Assessment/Plan:       Problem List Items Addressed This Visit        Pulmonary    COPD with asthma - Primary     Controlled on albuterol prn  Smoking cessation  Follow up with Dr. Rashid 5/31/22              Oncology    Malignant neoplasm of endocervix       Other    Tobacco abuse disorder     Assistance with smoking cessation was offered, including:  []  Medications  [x]  Counseling  []  Printed Information on Smoking Cessation  [x]  Referral to a Smoking Cessation Program    Patient was counseled regarding smoking for 3-10 minutes.                   Risks and possible pulmonary complications of surgery include risk of respiratory failure requiring mechanical ventilation, post operative pneumonia, post operative atelectasis, deep venous thrombosis, pulmonary embolism and death.    Based on my assessment , it is okay to proceed with procedure PROVIDED RISK IS ACCEPTABLE TO BOTH THE PATIENT AND THE SURGEON PERFORMING THE SURGERY.      Discussed diagnosis, its evaluation, treatment and usual course. All questions answered.    Patient verbalized understanding of plan and left in no acute distress    Thank you for the courtesy of participating in the care of this patient    Yesica Mendiola PA-C

## 2022-04-22 NOTE — ASSESSMENT & PLAN NOTE
Assistance with smoking cessation was offered, including:  []  Medications  [x]  Counseling  []  Printed Information on Smoking Cessation  [x]  Referral to a Smoking Cessation Program    Patient was counseled regarding smoking for 3-10 minutes.

## 2022-04-27 ENCOUNTER — PATIENT MESSAGE (OUTPATIENT)
Dept: SMOKING CESSATION | Facility: CLINIC | Age: 52
End: 2022-04-27
Payer: MEDICAID

## 2022-05-02 ENCOUNTER — PATIENT MESSAGE (OUTPATIENT)
Dept: PHARMACY | Facility: CLINIC | Age: 52
End: 2022-05-02
Payer: MEDICAID

## 2022-05-04 ENCOUNTER — TELEPHONE (OUTPATIENT)
Dept: INFUSION THERAPY | Facility: HOSPITAL | Age: 52
End: 2022-05-04
Payer: MEDICAID

## 2022-05-04 ENCOUNTER — TELEPHONE (OUTPATIENT)
Dept: HEMATOLOGY/ONCOLOGY | Facility: CLINIC | Age: 52
End: 2022-05-04
Payer: MEDICAID

## 2022-05-04 NOTE — TELEPHONE ENCOUNTER
Spoke with patient.  [facility] and [facility]  Thursday 5/12 starting at 8 am.  Patient said appointment was good.

## 2022-05-04 NOTE — TELEPHONE ENCOUNTER
LM for pt to contact me directly due to cancelled chemo appt 5/2/22  Direct number left in message  Dr Moseley's nurse attempted to call her as well on Monday  .

## 2022-05-05 ENCOUNTER — TELEPHONE (OUTPATIENT)
Dept: HEMATOLOGY/ONCOLOGY | Facility: CLINIC | Age: 52
End: 2022-05-05
Payer: MEDICAID

## 2022-05-05 NOTE — TELEPHONE ENCOUNTER
----- Message from Latonya Parker sent at 5/5/2022  1:39 PM CDT -----  Contact: pt  Calling to follow up on her infusion appt / wants to know which one she is to go to and can be reached at [phone]//thanks/dbw

## 2022-05-11 ENCOUNTER — TELEPHONE (OUTPATIENT)
Dept: HEMATOLOGY/ONCOLOGY | Facility: CLINIC | Age: 52
End: 2022-05-11
Payer: MEDICAID

## 2022-05-12 ENCOUNTER — INFUSION (OUTPATIENT)
Dept: INFUSION THERAPY | Facility: HOSPITAL | Age: 52
End: 2022-05-12
Attending: INTERNAL MEDICINE
Payer: MEDICAID

## 2022-05-12 ENCOUNTER — SPECIALTY PHARMACY (OUTPATIENT)
Dept: PHARMACY | Facility: CLINIC | Age: 52
End: 2022-05-12
Payer: MEDICAID

## 2022-05-12 ENCOUNTER — OFFICE VISIT (OUTPATIENT)
Dept: HEMATOLOGY/ONCOLOGY | Facility: CLINIC | Age: 52
End: 2022-05-12
Payer: MEDICAID

## 2022-05-12 VITALS
OXYGEN SATURATION: 97 % | HEIGHT: 67 IN | SYSTOLIC BLOOD PRESSURE: 110 MMHG | HEART RATE: 85 BPM | WEIGHT: 198.75 LBS | TEMPERATURE: 97 F | BODY MASS INDEX: 31.19 KG/M2 | RESPIRATION RATE: 18 BRPM | DIASTOLIC BLOOD PRESSURE: 73 MMHG

## 2022-05-12 DIAGNOSIS — Z17.0 MALIGNANT NEOPLASM OF OVERLAPPING SITES OF RIGHT BREAST IN FEMALE, ESTROGEN RECEPTOR POSITIVE: Primary | ICD-10-CM

## 2022-05-12 DIAGNOSIS — C73 PAPILLARY THYROID CARCINOMA: ICD-10-CM

## 2022-05-12 DIAGNOSIS — E03.9 HYPOTHYROIDISM, UNSPECIFIED TYPE: ICD-10-CM

## 2022-05-12 DIAGNOSIS — C77.3 MALIGNANT NEOPLASM METASTATIC TO LYMPH NODE OF AXILLA: Primary | ICD-10-CM

## 2022-05-12 DIAGNOSIS — C50.811 MALIGNANT NEOPLASM OF OVERLAPPING SITES OF RIGHT BREAST IN FEMALE, ESTROGEN RECEPTOR POSITIVE: Primary | ICD-10-CM

## 2022-05-12 DIAGNOSIS — Z17.0 MALIGNANT NEOPLASM OF OVERLAPPING SITES OF RIGHT BREAST IN FEMALE, ESTROGEN RECEPTOR POSITIVE: ICD-10-CM

## 2022-05-12 DIAGNOSIS — C53.0 MALIGNANT NEOPLASM OF ENDOCERVIX: Primary | ICD-10-CM

## 2022-05-12 DIAGNOSIS — C79.51 SECONDARY CANCER OF BONE: ICD-10-CM

## 2022-05-12 DIAGNOSIS — C50.811 MALIGNANT NEOPLASM OF OVERLAPPING SITES OF RIGHT BREAST IN FEMALE, ESTROGEN RECEPTOR POSITIVE: ICD-10-CM

## 2022-05-12 PROCEDURE — 96415 CHEMO IV INFUSION ADDL HR: CPT

## 2022-05-12 PROCEDURE — 99214 PR OFFICE/OUTPT VISIT, EST, LEVL IV, 30-39 MIN: ICD-10-PCS | Mod: 95,,,

## 2022-05-12 PROCEDURE — 25000003 PHARM REV CODE 250: Performed by: INTERNAL MEDICINE

## 2022-05-12 PROCEDURE — 96417 CHEMO IV INFUS EACH ADDL SEQ: CPT

## 2022-05-12 PROCEDURE — 96367 TX/PROPH/DG ADDL SEQ IV INF: CPT

## 2022-05-12 PROCEDURE — 99214 OFFICE O/P EST MOD 30 MIN: CPT | Mod: 95,,,

## 2022-05-12 PROCEDURE — A4216 STERILE WATER/SALINE, 10 ML: HCPCS | Performed by: INTERNAL MEDICINE

## 2022-05-12 PROCEDURE — 63600175 PHARM REV CODE 636 W HCPCS: Performed by: INTERNAL MEDICINE

## 2022-05-12 PROCEDURE — 96375 TX/PRO/DX INJ NEW DRUG ADDON: CPT

## 2022-05-12 PROCEDURE — 96413 CHEMO IV INFUSION 1 HR: CPT

## 2022-05-12 RX ORDER — HEPARIN 100 UNIT/ML
500 SYRINGE INTRAVENOUS
Status: DISCONTINUED | OUTPATIENT
Start: 2022-05-12 | End: 2022-05-12 | Stop reason: HOSPADM

## 2022-05-12 RX ORDER — LORAZEPAM 2 MG/ML
0.5 INJECTION INTRAMUSCULAR
Status: CANCELLED
Start: 2022-05-12 | End: 2022-05-12

## 2022-05-12 RX ORDER — FAMOTIDINE 10 MG/ML
20 INJECTION INTRAVENOUS
Status: COMPLETED | OUTPATIENT
Start: 2022-05-12 | End: 2022-05-12

## 2022-05-12 RX ORDER — DIPHENHYDRAMINE HYDROCHLORIDE 50 MG/ML
50 INJECTION INTRAMUSCULAR; INTRAVENOUS ONCE AS NEEDED
Status: CANCELLED | OUTPATIENT
Start: 2022-05-12

## 2022-05-12 RX ORDER — SODIUM CHLORIDE 0.9 % (FLUSH) 0.9 %
10 SYRINGE (ML) INJECTION
Status: CANCELLED | OUTPATIENT
Start: 2022-05-12

## 2022-05-12 RX ORDER — LEVOTHYROXINE SODIUM 75 UG/1
75 TABLET ORAL DAILY
Qty: 30 TABLET | Refills: 11 | Status: SHIPPED | OUTPATIENT
Start: 2022-05-12 | End: 2022-08-11

## 2022-05-12 RX ORDER — EPINEPHRINE 0.3 MG/.3ML
0.3 INJECTION SUBCUTANEOUS ONCE AS NEEDED
Status: CANCELLED | OUTPATIENT
Start: 2022-05-12

## 2022-05-12 RX ORDER — HEPARIN 100 UNIT/ML
500 SYRINGE INTRAVENOUS
Status: CANCELLED | OUTPATIENT
Start: 2022-05-12

## 2022-05-12 RX ORDER — FAMOTIDINE 10 MG/ML
20 INJECTION INTRAVENOUS
Status: CANCELLED | OUTPATIENT
Start: 2022-05-12

## 2022-05-12 RX ORDER — SODIUM CHLORIDE 0.9 % (FLUSH) 0.9 %
10 SYRINGE (ML) INJECTION
Status: DISCONTINUED | OUTPATIENT
Start: 2022-05-12 | End: 2022-05-12 | Stop reason: HOSPADM

## 2022-05-12 RX ADMIN — PACLITAXEL 366 MG: 6 INJECTION, SOLUTION INTRAVENOUS at 11:05

## 2022-05-12 RX ADMIN — SODIUM CHLORIDE 200 MG: 9 INJECTION, SOLUTION INTRAVENOUS at 09:05

## 2022-05-12 RX ADMIN — FAMOTIDINE 20 MG: 10 INJECTION, SOLUTION INTRAVENOUS at 11:05

## 2022-05-12 RX ADMIN — HEPARIN 500 UNITS: 100 SYRINGE at 03:05

## 2022-05-12 RX ADMIN — Medication 10 ML: at 03:05

## 2022-05-12 RX ADMIN — APREPITANT 130 MG: 130 INJECTION, EMULSION INTRAVENOUS at 11:05

## 2022-05-12 RX ADMIN — LORAZEPAM 0.5 MG: 2 INJECTION INTRAMUSCULAR; INTRAVENOUS at 09:05

## 2022-05-12 RX ADMIN — PALONOSETRON HYDROCHLORIDE 0.25 MG: 0.25 INJECTION, SOLUTION INTRAVENOUS at 11:05

## 2022-05-12 RX ADMIN — CARBOPLATIN 525 MG: 10 INJECTION, SOLUTION INTRAVENOUS at 02:05

## 2022-05-12 RX ADMIN — DIPHENHYDRAMINE HYDROCHLORIDE 50 MG: 50 INJECTION INTRAMUSCULAR; INTRAVENOUS at 10:05

## 2022-05-12 RX ADMIN — SODIUM CHLORIDE: 0.9 INJECTION, SOLUTION INTRAVENOUS at 09:05

## 2022-05-12 NOTE — PROGRESS NOTES
Subjective:      DATE OF VISIT: 5/18/2022   ?   ?   Patient ID:?Josey Flores is a 51 y.o. female.?? MR#: [id number]   ?   PRIMARY PROVIDER: Dr. Asencio  ?   CHIEF COMPLAINT: lab review/eval for chemo?????   ?   ONCOLOGIC DIAGNOSIS: Stage IV cervical squamous cell carcinoma     stage IV, T2 N1b M1, invasive breast carcinoma, ER/WY+, HER2-     Papillary thyroid carcinoma status post total thyroidectomy on 08/31/2017, mpT1b N0      Cervical HSIL MIREILLE 3 s/p LEEP, 2017  ?   CURRENT TREATMENT:  Letrozole; Carboplatin, paclitaxel and pembrolizumab, C1D1 1/28/22    PAST TREATMENT:Palliative chemotherapy: cisplatin, paclitaxel and bevacizumab; 02/22/2021 cycle 1 day 1 -> bevacizumab maintenance after 6 cycles  Palbociclib and letrozole (palbociclib held with ongoing chemotherapy due to concern for cytopenias)    The patient location is: Vehicle  The chief complaint leading to consultation is: follow-up    Visit type: audiovisual    Face to Face time with patient: 15   30 minutes of total time spent on the encounter, which includes face to face time and non-face to face time preparing to see the patient (eg, review of tests), Obtaining and/or reviewing separately obtained history, Documenting clinical information in the electronic or other health record, Independently interpreting results (not separately reported) and communicating results to the patient/family/caregiver, or Care coordination (not separately reported).     Each patient to whom he or she provides medical services by telemedicine is:  (1) informed of the relationship between the physician and patient and the respective role of any other health care provider with respect to management of the patient; and (2) notified that he or she may decline to receive medical services by telemedicine and may withdraw from such care at any time.        HPI Mrs. Flores is a 51-year-old female who presents today by virtual visit for 3 week f/u lab review and assessment for  carbo/taxol/keytruda. She notes increase in fatigue and nausea which she is managing with zofran. Pt states overall she is feeling okay. Denies vomiting, diarrhea, constipation, dysuria, fever, chills. She is doing her best to eat and maintain hydration and verbalizes understanding that IV hydration is available if the need arises.       Oncology History   Secondary cancer of bone   Malignant neoplasm of endocervix   9/10/2020 Imaging Significant Findings    CT A/P: Negative for ascites, omental caking, lymphadenopathy, or a gross cervical mass     12/7/2020 Biopsy    EMBX: SCC, p16+     1/12/2021 Imaging Significant Findings    MRI Pelvis w/ w/o  1. 4.9 cm cervical mass  2. R parametrial invasion  3. R external iliac lymphadenopathy     1/27/2021 Imaging Significant Findings    PET/CT: FDG avidity in the chest     2/18/2021 Biopsy    Endoscopic biopsy of the lung: +      2/22/2021 - 6/29/2021 Chemotherapy    Cisplatin/paclitaxel/avastin x6     3/12/2021 Genetic Testing    STRATA: FRANK, PIK3c, Ep300, ERBB3, KDM6A     4/24/2021 Imaging Significant Findings    CT Pelvis w/: JUAN FRANCISCO with a normal cervix     4/26/2021 Imaging Significant Findings    PET/CT: No FDG avidity     5/18/2021 Imaging Significant Findings    CT A/P w/: JUAN FRANCISCO     6/5/2021 Imaging Significant Findings    CT A/P w/: JUAN FRANCISCO     7/12/2021 Imaging Significant Findings    PET/CT: JUAN FRANCISCO     7/13/2021 - 12/28/2021 Maintenance Therapy    Avastin x 9     10/20/2021 Imaging Significant Findings    PET/CT: JUAN FRANCISCO     1/11/2022 Imaging Significant Findings    PET/CT: Increased FDG avidity in the cervix.  No other evidence of disease.     1/28/2022 -  Chemotherapy    Carboplatin/paclitaxel/pembrolizumab x     3/25/2022 Imaging Significant Findings    PET/CT: Persistent FDG avidity in the cervix.  New FDG avidity in the inguinal lymph nodes (although it does not look suspicious)        Social History     Socioeconomic History    Marital status:     Number of  children: 2   Tobacco Use    Smoking status: Current Every Day Smoker     Packs/day: 1.00     Years: 32.00     Pack years: 32.00     Types: Cigarettes     Start date: 1985     Last attempt to quit: 2017     Years since quittin.9    Smokeless tobacco: Never Used    Tobacco comment: 45 pack year history   Substance and Sexual Activity    Alcohol use: No    Drug use: No    Sexual activity: Yes     Partners: Male   Social History Narrative     with two adult children; common law marriage (10+years); raised [employer], no current [employer] practice      Family History   Problem Relation Age of Onset    Arthritis Mother     Early death Mother         64 at time of death    Heart attack Mother     Heart disease Mother     Heart failure Mother     Hypertension Mother     Coronary artery disease Father     Hearing loss Father     Heart disease Father     Hyperlipidemia Father     Hypertension Father     Mental illness Father     Learning disabilities Son     Cancer Maternal Aunt       Past Surgical History:   Procedure Laterality Date    ABSCESS DRAINAGE      bilateral axilla    ADENOIDECTOMY      APPENDECTOMY      BREAST BIOPSY Right     x3    CHOLECYSTECTOMY      ENDOBRONCHIAL ULTRASOUND Bilateral 2021    Procedure: ENDOBRONCHIAL ULTRASOUND (EBUS);  Surgeon: Jaron Ni MD;  Location: [facility] ENDO;  Service: Pulmonary;  Laterality: Bilateral;    FLUOROSCOPY N/A 2021    Procedure: Mediport placement;  Surgeon: Noah Phelan MD;  Location: [facility] CATH LAB;  Service: General;  Laterality: N/A;    MASS EXCISION      MEDIPORT INSERTION, SINGLE Right 2021    SKIN GRAFT  2017    THYROIDECTOMY Bilateral     TONSILLECTOMY          Review of Systems   Constitutional: Positive for fatigue.   Respiratory: Negative for cough and shortness of breath.    Gastrointestinal: Positive for nausea.   Psychiatric/Behavioral: The patient is not nervous/anxious.        ?    A comprehensive 14-point review of systems was reviewed with patient and was negative other than as specified above.   ?     Objective:      Physical Exam  Vitals reviewed: virtual visit.   HENT:      Head: Normocephalic.   Neurological:      Mental Status: She is alert and oriented to person, place, and time.   Psychiatric:         Mood and Affect: Mood normal.         Behavior: Behavior normal.         Thought Content: Thought content normal.         Judgment: Judgment normal.           ?   There were no vitals filed for this visit.   ?   ECOG:?2     ?   Laboratory:  ?   Lab Visit on 05/12/2022   Component Date Value Ref Range Status    WBC 05/12/2022 5.40  3.90 - 12.70 K/uL Final    RBC 05/12/2022 3.25 (A) 4.00 - 5.40 M/uL Final    Hemoglobin 05/12/2022 11.0 (A) 12.0 - 16.0 g/dL Final    Hematocrit 05/12/2022 35.1 (A) 37.0 - 48.5 % Final    MCV 05/12/2022 108 (A) 82 - 98 fL Final    MCH 05/12/2022 33.8 (A) 27.0 - 31.0 pg Final    MCHC 05/12/2022 31.3 (A) 32.0 - 36.0 g/dL Final    RDW 05/12/2022 17.8 (A) 11.5 - 14.5 % Final    Platelets 05/12/2022 234  150 - 450 K/uL Final    MPV 05/12/2022 11.2  9.2 - 12.9 fL Final    Immature Granulocytes 05/12/2022 1.7 (A) 0.0 - 0.5 % Final    Gran # (ANC) 05/12/2022 2.4  1.8 - 7.7 K/uL Final    Immature Grans (Abs) 05/12/2022 0.09 (A) 0.00 - 0.04 K/uL Final    Lymph # 05/12/2022 2.0  1.0 - 4.8 K/uL Final    Mono # 05/12/2022 0.6  0.3 - 1.0 K/uL Final    Eos # 05/12/2022 0.2  0.0 - 0.5 K/uL Final    Baso # 05/12/2022 0.04  0.00 - 0.20 K/uL Final    nRBC 05/12/2022 0  0 /100 WBC Final    Gran % 05/12/2022 44.5  38.0 - 73.0 % Final    Lymph % 05/12/2022 37.4  18.0 - 48.0 % Final    Mono % 05/12/2022 11.3  4.0 - 15.0 % Final    Eosinophil % 05/12/2022 4.4  0.0 - 8.0 % Final    Basophil % 05/12/2022 0.7  0.0 - 1.9 % Final    Differential Method 05/12/2022 Automated   Final    Sodium 05/12/2022 140  136 - 145 mmol/L Final    Potassium 05/12/2022 4.0  3.5  - 5.1 mmol/L Final    Chloride 05/12/2022 102  95 - 110 mmol/L Final    CO2 05/12/2022 27  23 - 29 mmol/L Final    Glucose 05/12/2022 123 (A) 70 - 110 mg/dL Final    BUN 05/12/2022 10  6 - 20 mg/dL Final    Creatinine 05/12/2022 1.2  0.5 - 1.4 mg/dL Final    Calcium 05/12/2022 9.3  8.7 - 10.5 mg/dL Final    Total Protein 05/12/2022 7.1  6.0 - 8.4 g/dL Final    Albumin 05/12/2022 3.6  3.5 - 5.2 g/dL Final    Total Bilirubin 05/12/2022 0.4  0.1 - 1.0 mg/dL Final    Alkaline Phosphatase 05/12/2022 73  55 - 135 U/L Final    AST 05/12/2022 15  10 - 40 U/L Final    ALT 05/12/2022 12  10 - 44 U/L Final    Anion Gap 05/12/2022 11  8 - 16 mmol/L Final    eGFR if  05/12/2022 60  >60 mL/min/1.73 m^2 Final    eGFR if non African American 05/12/2022 52 (A) >60 mL/min/1.73 m^2 Final    TSH 05/12/2022 26.688 (A) 0.400 - 4.000 uIU/mL Final    Free T4 05/12/2022 0.69 (A) 0.71 - 1.51 ng/dL Final      ?   Imaging:    Results for orders placed or performed during the hospital encounter of 03/25/22 (from the past 2160 hour(s))   CT/PET SCAN ROUTINE    Impression    Persistent cervical uptake with max SUV now measuring 14 compared with 17 on the prior study.  There are also not enlarged but now newly mildly FDG avid lymph nodes in the right inguinal region with max SUV of 3.1 as above.  Nonspecific mildly FDG avid mediastinal lymph nodes.  Additional area of soft tissue density in the posterior subcutaneous soft tissues in the medial right upper thigh with FDG uptake abutting the skin surface.  Correlate with exam findings.  Other findings as above.      Electronically signed by: Bandar Colón MD  Date:    03/25/2022  Time:    13:50     *Note: Due to a large number of results and/or encounters for the requested time period, some results have not been displayed. A complete set of results can be found in Results Review.        Results for orders placed or performed during the hospital encounter of 02/26/22  (from the past 2160 hour(s))   CT Abdomen Pelvis With Contrast    Impression    No definite acute abnormality.    Additional details as above.    All CT scans at this facility are performed  using dose modulation techniques as appropriate to performed exam including the following:  automated exposure control; adjustment of mA and/or kV according to the patients size (this includes techniques or standardized protocols for targeted exams where dose is matched to indication/reason for exam: i.e. extremities or head);  iterative reconstruction technique.      Electronically signed by: Raphael Morris  Date:    02/26/2022  Time:    22:05     *Note: Due to a large number of results and/or encounters for the requested time period, some results have not been displayed. A complete set of results can be found in Results Review.        ?   Assessment/Plan:       Problem List Items Addressed This Visit        Oncology    Malignant neoplasm of overlapping sites of right breast in female, estrogen receptor positive     Had been on systemic therapy with palbociclib and letrozole.   Palbociclib continues on hold  due to persistent cytopenia.    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 Status post prior lines of treatment and currently on chemo immunotherapy due to recent follicle progression/recurrence noted in PET scan from January of 2022.     She was started on Keytruda plus carboplatin and Taxol with plan to re-stage after 3 cycles.  Restaging PET scan from 03/25/2022 showed persistent cervical uptake with mild decrease in FDG avidity, interestingly there were new mildly FDG avid lymph nodes in the right inguinal region and additional area of soft tissue density in the posterior subcutaneous soft tissue in the medial right upper thigh.     Labs reviewed, no concerning cytopenia  -ANC 2.4;  Plts 234: tbili 0.4; AST/ALT 15/12; TSH in process  -Case discussed with primary oncologist  -Ok to proceed with C5D1 of carbo/taxol/keytruda today               Relevant Orders    CBC Auto Differential    Comprehensive Metabolic Panel    TSH    Secondary cancer of bone     Zometa infusion q 12 weeks  --Last dose 01/18/2022; Next dose to be scheduled post chemo today  DEXA 8/17/2021   --No evidence of significant bone density loss  Continues on Vitamin D and Calcium supplementation    Recent PET scan January 2022 without evidence of new bony metastatic disease.             Papillary thyroid carcinoma     s/p total thyroidectomy on levothyroxine.   Pt currently taking levothyroxine 250mcg daily  TSH--pending              Endocrine    Hypothyroidism    Relevant Medications    levothyroxine (SYNTHROID) 75 MCG tablet        Follow-Up:   In 3 weeks with CBC, CMP, and TSH for C6D1 carbo/taxol/keytruda    Update: TSH resulted 26.688 will increase Levothyroxine dose by 25mcg; Take 275 mcg levothyroxine daily f/u with PCP/endocrinologist

## 2022-05-12 NOTE — TELEPHONE ENCOUNTER
Josey Flores is a 51 y.o. female, who is followed by the specialty pharmacy service for management and education of her Ibrance and Letrozole.  She has been on therapy with Ibrance and Letrozole for 6 years.  I have reviewed her electronic medical record and current medication list and determined that specialty medication adjustment Is not needed at this time.    Patient has not experienced adverse events.  She Is adherent reporting 0 missed doses since last review.  She is on target to meet goals of therapy and will continue treatment.    Follow Up Review 5/12/2022 4/5/2022 3/4/2022   # of missed doses 0 1 0   New Medications? No No No   New Conditions? No No No   New Allergies? No No No   Medication Effective? Good Excellent Excellent   Some recent data might be hidden       Therapy is appropriate to continue.    Therapy is effective: Yes  Recommendations: none at this time.  Review Method: Patient Contact    Labs as of 5/12 are appropriate for pt to continue therapy with Ibrance.     Kyara Jason, PharmD  [facility]  [address]  [facility] [address]  Phone: [phone]  Fax: [phone]

## 2022-05-12 NOTE — DISCHARGE INSTRUCTIONS
[facility]  [address] [facility]  [address]  [phone] phone     [phone] fax  Hours of Operation: Monday- Friday 8:00am- 5:00pm  After hours phone  [phone]  Hematology / Oncology Physicians on call      PRASANNA [address] Dr[address] Dr., VINNY Garrison, VINNY Prieto, ESTEBAN Mireles    Please call with any concerns regarding your appointment today.          FALL PREVENTION   Falls often occur due to slipping, tripping or losing your balance. Here are ways to reduce your risk of falling again.   Was there anything that caused your fall that can be fixed, removed or replaced?   Make your home safe by keeping walkways clear of objects you may trip over.   Use non-slip pads under rugs.   Do not walk in poorly lit areas.   Do not stand on chairs or wobbly ladders.   Use caution when reaching overhead or looking upward. This position can cause a loss of balance.   Be sure your shoes fit properly, have non-slip bottoms and are in good condition.   Be cautious when going up and down stairs, curbs, and when walking on uneven sidewalks.   If your balance is poor, consider using a cane or walker.   If your fall was related to alcohol use, stop or limit alcohol intake.   If your fall was related to use of sleeping medicines, talk to your doctor about this. You may need to reduce your dosage at bedtime if you awaken during the night to go to the bathroom.   To reduce the need for nighttime bathroom trips:   Avoid drinking fluids for several hours before going to bed   Empty your bladder before going to bed   Men can keep a urinal at the bedside   © 6556-7119 [employer] [facility], [address] [place] [address], [place] [address]. All rights reserved. This information is not intended as a substitute for professional medical care. Always follow your healthcare professional's instructions.      Discharge Instructions for  Chemotherapy   Your doctor prescribed a type of medication therapy for you called chemotherapy. Doctors prescribe chemotherapy for many different types of illnesses, including cancer. There are many types of chemotherapy. This sheet provides general guidelines on how you can take care of yourself after your chemotherapy.   Mouth Care   Dont be discouraged if you get mouth sores, even if you are following all your doctors instructions. Many people get mouth sores as a side effect of chemotherapy. Heres what you can do to prevent mouth sores:   Keep your mouth clean. Brush your teeth with a soft-bristle toothbrush after every meal.   Use an oral swab or special soft toothbrush if your gums bleed during regular brushing.   Dont use dental floss if your platelet count is below 50,000. Your doctor or nurse will tell you if this is the case.   Use any mouthwashes given to you as directed.   If you cant tolerate regular methods, use salt and baking soda to clean your mouth. Mix 1 teaspoon(s) of salt and 1 teaspoon(s) of baking soda into an 8-ounce glass of warm water. Swish and spit.   Watch your mouth and tongue for white patches. This is a sign of fungal infection, a common side effect of chemotherapy. Be sure to tell your doctor about these patches. Medication can be prescribed to help you fight the fungal infection.  Other Home Care   Try to exercise. Exercise keeps you strong and keeps your heart and lungs active. Walk as much as you can without becoming dizzy or weak.   Keep clean. During chemotherapy, your body cant fight infection very well. Take short baths or showers.   Use moisturizing soap. Chemotherapy can make your skin dry.   Apply moisturizing lotion several times a day to help relieve dry skin.   Dont take very hot or very cold showers or baths.   Dont be surprised if your chemotherapy causes slight burns to your skin--usually on the hands and feet. Some drugs used in high doses cause this to  happen. Ask for a special cream to help relieve the burn and protect your skin.   Let your doctor know if your throat is sore. You may have an infection that needs treatment.   Remember, many patients feel sick and lose their appetites during treatment. Eat small meals several times a day to keep your strength up.   Choose bland foods with little taste or smell if you are reacting strongly to food.   Be sure to cook all food thoroughly. This kills bacteria and helps you avoid infection.   Eat foods that are soft. Soft foods are less likely to cause stomach irritation.   Follow-Up   Make a follow-up appointment as directed by our staff.   When to Call Your Doctor   Call your doctor right away if you have any of the following:   Unexplained bleeding   Trouble concentrating   Ongoing fatigue   Shortness of breath, wheezing, or trouble breathing   Rapid, irregular heartbeat; chest pain   Dizziness, lightheadedness   Constant feeling of being cold   Hives or a cut or rash that swells, turns red, feels hot or painful, or begins to ooze   Fever of 100.4°F or higher, or chills    © 9175-2026 [facility] [place], [address] [place] [address], [place] [address]. All rights reserved. This information is not intended as a substitute for professional medical care.

## 2022-05-12 NOTE — PLAN OF CARE
Problem: Adult Inpatient Plan of Care  Goal: Plan of Care Review  Outcome: Ongoing, Progressing  Flowsheets (Taken 5/12/2022 1249)  Plan of Care Reviewed With:   patient   spouse  Goal: Patient-Specific Goal (Individualized)  Outcome: Ongoing, Progressing  Flowsheets (Taken 5/12/2022 1249)  Anxieties, Fears or Concerns: patient voices no concerns at this time.  Individualized Care Needs: Feet elevated in recliner, warm blanket and pillow provided for comfort measures  Patient-Specific Goals (Include Timeframe): tolerate treatment without adverse reaction  Goal: Optimal Comfort and Wellbeing  Outcome: Ongoing, Progressing  Intervention: Provide Person-Centered Care  Flowsheets (Taken 5/12/2022 1249)  Trust Relationship/Rapport:   care explained   questions encouraged   choices provided   reassurance provided   emotional support provided   thoughts/feelings acknowledged   empathic listening provided   questions answered

## 2022-05-12 NOTE — PROGRESS NOTES
Subjective:      DATE OF VISIT: 5/12/2022   ?   ?   Patient ID:?Josey Flores is a 51 y.o. female.?? MR#: [id number]   ?   PRIMARY PROVIDER: Dr. Estefani Asencio   ?   CHIEF COMPLAINT: one week post chemo f/u?????   ?   ONCOLOGIC DIAGNOSIS: Stage IV cervical squamous cell carcinoma     stage IV, T2 N1b M1, invasive breast carcinoma, ER/UT+, HER2-     Papillary thyroid carcinoma status post total thyroidectomy on 08/31/2017, mpT1b N0      Cervical HSIL MIREILLE 3 s/p LEEP, 2017  ?   CURRENT TREATMENT: Letrozole; Carboplatin, paclitaxel and pembrolizumab, C1D1 1/28/22    PAST TREATMENT: Palliative chemotherapy: cisplatin, paclitaxel and bevacizumab; 02/22/2021 cycle 1 day 1 -> bevacizumab maintenance after 6 cycles  Palbociclib and letrozole (palbociclib held with ongoing chemotherapy due to concern for cytopenias)    The patient location is: [facility]  The chief complaint leading to consultation is: One week f/u post chemo    Visit type: audiovisual    Face to Face time with patient: 15    60 minutes of total time spent on the encounter, which includes face to face time and non-face to face time preparing to see the patient (eg, review of tests), Obtaining and/or reviewing separately obtained history, Documenting clinical information in the electronic or other health record, Independently interpreting results (not separately reported) and communicating results to the patient/family/caregiver, or Care coordination (not separately reported).       Each patient to whom he or she provides medical services by telemedicine is:  (1) informed of the relationship between the physician and patient and the respective role of any other health care provider with respect to management of the patient; and (2) notified that he or she may decline to receive medical services by telemedicine and may withdraw from such care at any time.       INTERVAL EVENTS     She presents today with  by virtual visit for one week follow-up after  "initiation of new treatment (carbo/taxol/keytruda) for recurrent cervical carcinoma.  Pt c/o of itching rash to bilateral arms and knees, but overall she is feeling well. Pt states,"I am surprised at how good I feel!" She denies NVDC, pain, dysuria. She has been eating well and doing her best to remain hydrated. Palbociclib to remain on hold.          Review of Systems    ?   A comprehensive 14-point review of systems was reviewed with patient and was negative other than as specified above.   ?     Objective:      Physical Exam      ?   There were no vitals filed for this visit.   ?   ECOG:?1   General appearance: Generally well appearing, in no acute distress.   Head, eyes, ears, nose, and throat: Oropharynx clear with moist mucous membranes.   Cardiovascular: Regular rate and rhythm, S1, S2, no audible murmurs.   Respiratory: Lungs clear to auscultation bilaterally.   Abdomen: nontender, nondistended.   Extremities: Warm, without edema.   Neurologic: Alert and oriented. Grossly normal strength, coordination, and gait.   Skin: No rashes, ecchymoses or petechial lesion.   Psychiatric: normal mood and affect, conversant and appropriate    ?   Laboratory:  ?   Lab Visit on 05/12/2022   Component Date Value Ref Range Status    WBC 05/12/2022 5.40  3.90 - 12.70 K/uL Final    RBC 05/12/2022 3.25 (A) 4.00 - 5.40 M/uL Final    Hemoglobin 05/12/2022 11.0 (A) 12.0 - 16.0 g/dL Final    Hematocrit 05/12/2022 35.1 (A) 37.0 - 48.5 % Final    MCV 05/12/2022 108 (A) 82 - 98 fL Final    MCH 05/12/2022 33.8 (A) 27.0 - 31.0 pg Final    MCHC 05/12/2022 31.3 (A) 32.0 - 36.0 g/dL Final    RDW 05/12/2022 17.8 (A) 11.5 - 14.5 % Final    Platelets 05/12/2022 234  150 - 450 K/uL Final    MPV 05/12/2022 11.2  9.2 - 12.9 fL Final    Immature Granulocytes 05/12/2022 1.7 (A) 0.0 - 0.5 % Final    Gran # (ANC) 05/12/2022 2.4  1.8 - 7.7 K/uL Final    Immature Grans (Abs) 05/12/2022 0.09 (A) 0.00 - 0.04 K/uL Final    Lymph # " 05/12/2022 2.0  1.0 - 4.8 K/uL Final    Mono # 05/12/2022 0.6  0.3 - 1.0 K/uL Final    Eos # 05/12/2022 0.2  0.0 - 0.5 K/uL Final    Baso # 05/12/2022 0.04  0.00 - 0.20 K/uL Final    nRBC 05/12/2022 0  0 /100 WBC Final    Gran % 05/12/2022 44.5  38.0 - 73.0 % Final    Lymph % 05/12/2022 37.4  18.0 - 48.0 % Final    Mono % 05/12/2022 11.3  4.0 - 15.0 % Final    Eosinophil % 05/12/2022 4.4  0.0 - 8.0 % Final    Basophil % 05/12/2022 0.7  0.0 - 1.9 % Final    Differential Method 05/12/2022 Automated   Final    Sodium 05/12/2022 140  136 - 145 mmol/L Final    Potassium 05/12/2022 4.0  3.5 - 5.1 mmol/L Final    Chloride 05/12/2022 102  95 - 110 mmol/L Final    CO2 05/12/2022 27  23 - 29 mmol/L Final    Glucose 05/12/2022 123 (A) 70 - 110 mg/dL Final    BUN 05/12/2022 10  6 - 20 mg/dL Final    Creatinine 05/12/2022 1.2  0.5 - 1.4 mg/dL Final    Calcium 05/12/2022 9.3  8.7 - 10.5 mg/dL Final    Total Protein 05/12/2022 7.1  6.0 - 8.4 g/dL Final    Albumin 05/12/2022 3.6  3.5 - 5.2 g/dL Final    Total Bilirubin 05/12/2022 0.4  0.1 - 1.0 mg/dL Final    Alkaline Phosphatase 05/12/2022 73  55 - 135 U/L Final    AST 05/12/2022 15  10 - 40 U/L Final    ALT 05/12/2022 12  10 - 44 U/L Final    Anion Gap 05/12/2022 11  8 - 16 mmol/L Final    eGFR if  05/12/2022 60  >60 mL/min/1.73 m^2 Final    eGFR if non African American 05/12/2022 52 (A) >60 mL/min/1.73 m^2 Final      ?   ?   Assessment/Plan:     Problem List Items Addressed This Visit        Oncology    Malignant neoplasm metastatic to lymph node of axilla - Primary    Relevant Orders    CBC Auto Differential    Comprehensive Metabolic Panel    TSH               Follow-Up:   In 2 weeks with CBC, CMP, TSH, Free T4 with MD for cycle 2 day 1 keytruda/carbo/taxol.      ANA PAULA Marcus

## 2022-05-16 ENCOUNTER — INFUSION (OUTPATIENT)
Dept: INFUSION THERAPY | Facility: HOSPITAL | Age: 52
End: 2022-05-16
Attending: INTERNAL MEDICINE
Payer: MEDICAID

## 2022-05-16 VITALS
HEART RATE: 85 BPM | TEMPERATURE: 97 F | OXYGEN SATURATION: 99 % | SYSTOLIC BLOOD PRESSURE: 108 MMHG | DIASTOLIC BLOOD PRESSURE: 60 MMHG | RESPIRATION RATE: 18 BRPM

## 2022-05-16 DIAGNOSIS — C50.919 PRIMARY MALIGNANT NEOPLASM OF BREAST WITH METASTASIS TO OTHER SITE, UNSPECIFIED LATERALITY: Primary | ICD-10-CM

## 2022-05-16 PROCEDURE — 63600175 PHARM REV CODE 636 W HCPCS

## 2022-05-16 PROCEDURE — 96365 THER/PROPH/DIAG IV INF INIT: CPT

## 2022-05-16 PROCEDURE — 25000003 PHARM REV CODE 250

## 2022-05-16 RX ORDER — HEPARIN 100 UNIT/ML
500 SYRINGE INTRAVENOUS
Status: CANCELLED | OUTPATIENT
Start: 2022-05-16

## 2022-05-16 RX ORDER — SODIUM CHLORIDE 0.9 % (FLUSH) 0.9 %
10 SYRINGE (ML) INJECTION
Status: DISCONTINUED | OUTPATIENT
Start: 2022-05-16 | End: 2022-05-16 | Stop reason: HOSPADM

## 2022-05-16 RX ORDER — SODIUM CHLORIDE 0.9 % (FLUSH) 0.9 %
10 SYRINGE (ML) INJECTION
Status: CANCELLED | OUTPATIENT
Start: 2022-06-13

## 2022-05-16 RX ORDER — HEPARIN 100 UNIT/ML
500 SYRINGE INTRAVENOUS
Status: DISCONTINUED | OUTPATIENT
Start: 2022-05-16 | End: 2022-05-16 | Stop reason: HOSPADM

## 2022-05-16 RX ORDER — HEPARIN 100 UNIT/ML
500 SYRINGE INTRAVENOUS
Status: CANCELLED | OUTPATIENT
Start: 2022-06-13

## 2022-05-16 RX ORDER — ZOLEDRONIC ACID 0.04 MG/ML
4 INJECTION, SOLUTION INTRAVENOUS
Status: COMPLETED | OUTPATIENT
Start: 2022-05-16 | End: 2022-05-16

## 2022-05-16 RX ORDER — SODIUM CHLORIDE 0.9 % (FLUSH) 0.9 %
10 SYRINGE (ML) INJECTION
Status: CANCELLED | OUTPATIENT
Start: 2022-05-16

## 2022-05-16 RX ORDER — ZOLEDRONIC ACID 0.04 MG/ML
4 INJECTION, SOLUTION INTRAVENOUS
Status: CANCELLED | OUTPATIENT
Start: 2022-06-13

## 2022-05-16 RX ADMIN — SODIUM CHLORIDE: 0.9 INJECTION, SOLUTION INTRAVENOUS at 02:05

## 2022-05-16 RX ADMIN — ZOLEDRONIC ACID 4 MG: 0.04 INJECTION, SOLUTION INTRAVENOUS at 02:05

## 2022-05-16 RX ADMIN — HEPARIN 500 UNITS: 100 SYRINGE at 03:05

## 2022-05-16 NOTE — PLAN OF CARE
Problem: Adult Inpatient Plan of Care  Goal: Plan of Care Review  Outcome: Ongoing, Progressing  Flowsheets (Taken 5/16/2022 1516)  Plan of Care Reviewed With: patient  Goal: Patient-Specific Goal (Individualized)  Outcome: Ongoing, Progressing  Flowsheets (Taken 5/16/2022 1516)  Anxieties, Fears or Concerns: patient voices no concerns at this time  Individualized Care Needs: Feet elevated in recliner for comfort measures  Patient-Specific Goals (Include Timeframe): tolerate treatment without adverse reaction  Goal: Optimal Comfort and Wellbeing  Outcome: Ongoing, Progressing  Intervention: Provide Person-Centered Care  Flowsheets (Taken 5/16/2022 1517)  Trust Relationship/Rapport:   care explained   questions encouraged   choices provided   reassurance provided   emotional support provided   thoughts/feelings acknowledged   questions answered   empathic listening provided

## 2022-05-16 NOTE — DISCHARGE INSTRUCTIONS
[facility]  [address] [address]  [address]  [phone] phone     [phone] fax  Hours of Operation: Monday- Friday 8:00am- 5:00pm  After hours phone  [phone]  Hematology / Oncology Physicians on call      PRASANNA Everett Dr., Dr., NP Sydney Prescott, VINNY Godoy FNP    Please call with any concerns regarding your appointment today.

## 2022-05-17 ENCOUNTER — PATIENT OUTREACH (OUTPATIENT)
Dept: ADMINISTRATIVE | Facility: OTHER | Age: 52
End: 2022-05-17
Payer: MEDICAID

## 2022-05-17 DIAGNOSIS — Z12.31 ENCOUNTER FOR SCREENING MAMMOGRAM FOR MALIGNANT NEOPLASM OF BREAST: Primary | ICD-10-CM

## 2022-05-17 NOTE — PROGRESS NOTES
Health Maintenance Due   Topic Date Due    Pneumococcal Vaccines (Age 0-64) (1 - PCV) Never done    TETANUS VACCINE  Never done    Colorectal Cancer Screening  Never done    LDCT Lung Screen  09/11/2020    Mammogram  12/15/2021    COVID-19 Vaccine (4 - Booster for Moderna series) 04/05/2022     Updates were requested from care everywhere.  Chart was reviewed for overdue Proactive [facility] Encounters (DEAN) topics (CRS, Breast Cancer Screening, Eye exam)  Health Maintenance has been updated.  LINKS immunization registry triggered.  Immunizations were reconciled.

## 2022-05-18 ENCOUNTER — OFFICE VISIT (OUTPATIENT)
Dept: GYNECOLOGIC ONCOLOGY | Facility: CLINIC | Age: 52
End: 2022-05-18
Payer: MEDICAID

## 2022-05-18 ENCOUNTER — TELEPHONE (OUTPATIENT)
Dept: HEMATOLOGY/ONCOLOGY | Facility: CLINIC | Age: 52
End: 2022-05-18
Payer: MEDICAID

## 2022-05-18 VITALS
SYSTOLIC BLOOD PRESSURE: 98 MMHG | DIASTOLIC BLOOD PRESSURE: 63 MMHG | HEIGHT: 67 IN | WEIGHT: 203.5 LBS | HEART RATE: 85 BPM | BODY MASS INDEX: 31.94 KG/M2

## 2022-05-18 DIAGNOSIS — Z72.0 TOBACCO ABUSE: ICD-10-CM

## 2022-05-18 DIAGNOSIS — J44.9 CHRONIC OBSTRUCTIVE PULMONARY DISEASE, UNSPECIFIED COPD TYPE: ICD-10-CM

## 2022-05-18 DIAGNOSIS — Z17.0 MALIGNANT NEOPLASM OF OVERLAPPING SITES OF RIGHT BREAST IN FEMALE, ESTROGEN RECEPTOR POSITIVE: ICD-10-CM

## 2022-05-18 DIAGNOSIS — C50.811 MALIGNANT NEOPLASM OF OVERLAPPING SITES OF RIGHT BREAST IN FEMALE, ESTROGEN RECEPTOR POSITIVE: ICD-10-CM

## 2022-05-18 DIAGNOSIS — C73 PAPILLARY THYROID CARCINOMA: ICD-10-CM

## 2022-05-18 DIAGNOSIS — E66.9 OBESITY (BMI 30-39.9): ICD-10-CM

## 2022-05-18 DIAGNOSIS — C53.0 MALIGNANT NEOPLASM OF ENDOCERVIX: Primary | ICD-10-CM

## 2022-05-18 PROCEDURE — 99215 PR OFFICE/OUTPT VISIT, EST, LEVL V, 40-54 MIN: ICD-10-PCS | Mod: S$PBB,,, | Performed by: OBSTETRICS & GYNECOLOGY

## 2022-05-18 PROCEDURE — 1159F MED LIST DOCD IN RCRD: CPT | Mod: CPTII,,, | Performed by: OBSTETRICS & GYNECOLOGY

## 2022-05-18 PROCEDURE — 3074F PR MOST RECENT SYSTOLIC BLOOD PRESSURE < 130 MM HG: ICD-10-PCS | Mod: CPTII,,, | Performed by: OBSTETRICS & GYNECOLOGY

## 2022-05-18 PROCEDURE — 3078F DIAST BP <80 MM HG: CPT | Mod: CPTII,,, | Performed by: OBSTETRICS & GYNECOLOGY

## 2022-05-18 PROCEDURE — 99999 PR PBB SHADOW E&M-EST. PATIENT-LVL III: ICD-10-PCS | Mod: PBBFAC,,, | Performed by: OBSTETRICS & GYNECOLOGY

## 2022-05-18 PROCEDURE — 1160F RVW MEDS BY RX/DR IN RCRD: CPT | Mod: CPTII,,, | Performed by: OBSTETRICS & GYNECOLOGY

## 2022-05-18 PROCEDURE — 1159F PR MEDICATION LIST DOCUMENTED IN MEDICAL RECORD: ICD-10-PCS | Mod: CPTII,,, | Performed by: OBSTETRICS & GYNECOLOGY

## 2022-05-18 PROCEDURE — 99999 PR PBB SHADOW E&M-EST. PATIENT-LVL III: CPT | Mod: PBBFAC,,, | Performed by: OBSTETRICS & GYNECOLOGY

## 2022-05-18 PROCEDURE — 99213 OFFICE O/P EST LOW 20 MIN: CPT | Mod: PBBFAC | Performed by: OBSTETRICS & GYNECOLOGY

## 2022-05-18 PROCEDURE — 3008F PR BODY MASS INDEX (BMI) DOCUMENTED: ICD-10-PCS | Mod: CPTII,,, | Performed by: OBSTETRICS & GYNECOLOGY

## 2022-05-18 PROCEDURE — 3078F PR MOST RECENT DIASTOLIC BLOOD PRESSURE < 80 MM HG: ICD-10-PCS | Mod: CPTII,,, | Performed by: OBSTETRICS & GYNECOLOGY

## 2022-05-18 PROCEDURE — 1160F PR REVIEW ALL MEDS BY PRESCRIBER/CLIN PHARMACIST DOCUMENTED: ICD-10-PCS | Mod: CPTII,,, | Performed by: OBSTETRICS & GYNECOLOGY

## 2022-05-18 PROCEDURE — 99215 OFFICE O/P EST HI 40 MIN: CPT | Mod: S$PBB,,, | Performed by: OBSTETRICS & GYNECOLOGY

## 2022-05-18 PROCEDURE — 3008F BODY MASS INDEX DOCD: CPT | Mod: CPTII,,, | Performed by: OBSTETRICS & GYNECOLOGY

## 2022-05-18 PROCEDURE — 3074F SYST BP LT 130 MM HG: CPT | Mod: CPTII,,, | Performed by: OBSTETRICS & GYNECOLOGY

## 2022-05-18 NOTE — ASSESSMENT & PLAN NOTE
Zometa infusion q 12 weeks  --Last dose 01/18/2022; Next dose to be scheduled post chemo today  DEXA 8/17/2021   --No evidence of significant bone density loss  Continues on Vitamin D and Calcium supplementation    Recent PET scan January 2022 without evidence of new bony metastatic disease.

## 2022-05-18 NOTE — ASSESSMENT & PLAN NOTE
Had been on systemic therapy with palbociclib and letrozole.   Palbociclib continues on hold  due to persistent cytopenia.    Continues on letrozole as prescribed without difficulty

## 2022-05-18 NOTE — PROGRESS NOTES
REFERRING PROVIDER  Estefani Asencio MD     REASON FOR CONSULT  Josey Flores  is a 51 y.o.  woman who presents for evaluation of PD-L1+ recurrent grade ? squamous cell carcinoma of the cervix      HISTORY OF PRESENT ILLNESS    Chemotherapy regimen: Carboplatin/paclitaxel/keytruda  Cycle: 6  Previous dose limiting toxicities: N  Previous dose reductions: N  Previous breaks: N  Previous changes in pre-medications: N    Energy level: baseline  Appetite: baseline  Fevers/chills/nights: no  Nausea: no  Emesis: no  Neuropathy: no  Discoloration of lower extremities: no  Tolerating PO: yes  Flatus: yes  BM: yes    Oncology History   Secondary cancer of bone   Malignant neoplasm of endocervix   9/10/2020 Imaging Significant Findings    CT A/P: Negative for ascites, omental caking, lymphadenopathy, or a gross cervical mass     12/7/2020 Biopsy    EMBX: SCC, p16+     1/12/2021 Imaging Significant Findings    MRI Pelvis w/ w/o  1. 4.9 cm cervical mass  2. R parametrial invasion  3. R external iliac lymphadenopathy     1/27/2021 Imaging Significant Findings    PET/CT: FDG avidity in the chest     2/18/2021 Biopsy    Endoscopic biopsy of the lung: +      2/22/2021 - 6/29/2021 Chemotherapy    Cisplatin/paclitaxel/avastin x6     3/12/2021 Genetic Testing    STRATA: FRANK, PIK3c, Ep300, ERBB3, KDM6A     4/24/2021 Imaging Significant Findings    CT Pelvis w/: JUAN FRANCISCO with a normal cervix     4/26/2021 Imaging Significant Findings    PET/CT: No FDG avidity     5/18/2021 Imaging Significant Findings    CT A/P w/: JUAN FRANCISCO     6/5/2021 Imaging Significant Findings    CT A/P w/: JUAN FRANCISCO     7/12/2021 Imaging Significant Findings    PET/CT: JUAN FRANCISCO     7/13/2021 - 12/28/2021 Maintenance Therapy    Avastin x 9     10/20/2021 Imaging Significant Findings    PET/CT: JUAN FRANCISCO     1/11/2022 Imaging Significant Findings    PET/CT: Increased FDG avidity in the cervix.  No other evidence of disease.     1/28/2022 -  Chemotherapy     Carboplatin/paclitaxel/pembrolizumab x     3/25/2022 Imaging Significant Findings    PET/CT: Persistent FDG avidity in the cervix.  New FDG avidity in the inguinal lymph nodes (although it does not look suspicious)          The following portions of the patient's history were reviewed and updated as appropriate: allergies, current medications, family history, medical history, social history and surgical history.    REVIEW OF SYSTEMS  All systems reviewed and negative except as noted in HPI.    OBJECTIVE   Vitals:    05/18/22 1050   BP: 98/63   Pulse: 85      Body mass index is 31.87 kg/m².      1. General: Well appearing, no apparent distress, alert and oriented.  2. Lymph: Neck symmetric without cervical or supraclavicular adenopathy or mass.  3. Lungs: Normal respiratory rate, no accessory muscle use.  4. Cardiac: Normal rate  5. Psych: Normal affect.  6. Abdomen:  non-distended, soft, non-tender, are no masses, no ascites, no hepatosplenomegaly.  7. Skin: Warm, dry, no rashes or lesions.   8. Extremities: Bilateral lower extremities without edema or tenderness.  9. Genitourinary               Pelvic Examination including:                a. External genitalia are normal in appearance. No lesions noted.  No inguinal lymphadenopathy               b. Urethral meatus is normal size, location, and appearance.               c. Urethra is negative.               d. Bladder is nontender. No masses noted.               e. Vagina has normal mucosa with radiation changes.  Cervix could not be visualized but was normal to palpation.  No nodularity along the vaginal walls.   f. Rectum with normal mucosa    ECOG status: 2    LABORATORY DATA  Lab data reviewed.    RADIOLOGICAL DATA  Radiology data reviewed.    ASSESSMENT / PLAN     1. Malignant neoplasm of endocervix    2. Papillary thyroid carcinoma    3. Malignant neoplasm of overlapping sites of right breast in female, estrogen receptor positive    4. Obesity (BMI 30-39.9)     5. Chronic obstructive pulmonary disease, unspecified COPD type    6. Tobacco abuse       F/U [facility] records  F/U [facility]  F/U Pharmacy    No dose limiting toxicities or signs of recurrence.  Administer cycle #6.  She has a lot of questions about her palbociclib and synthroid, in which I have message her providers about.  In regards to repeat imaging, I discussed the case with [facility], and they do not need to wait 3 months after completion of RT.  Our recommendation is to repeat a PET/CT after 6 cycles.  There is no high level evidence to support maintenance therapy, and we should consider discontinuing therapy if she had a CR to treatment.  Lastly, we discussed the importance of vaginal dilators.  [facility] 4 months.     PATIENT EDUCATION  Ready to learn, no apparent learning barriers were identified; learning preferences include listening. Explained diagnosis and treatment plan; patient expressed understanding of the content.    ADMINISTRATIVE BILLING  Greater than 50% of was spent in counseling.

## 2022-05-18 NOTE — ASSESSMENT & PLAN NOTE
Metastatic squamous cell carcinoma of the cervix. Status post prior lines of treatment and currently on chemo immunotherapy due to recent follicle progression/recurrence noted in PET scan from January of 2022.     She was started on Keytruda plus carboplatin and Taxol with plan to re-stage after 3 cycles.  Restaging PET scan from 03/25/2022 showed persistent cervical uptake with mild decrease in FDG avidity, interestingly there were new mildly FDG avid lymph nodes in the right inguinal region and additional area of soft tissue density in the posterior subcutaneous soft tissue in the medial right upper thigh.     Labs reviewed, no concerning cytopenia  -ANC 2.4; Plts 234: tbili 0.4; AST/ALT 15/12; TSH in process  -Case discussed with primary oncologist  -Ok to proceed with C5D1 of carbo/taxol/keytruda today

## 2022-05-18 NOTE — ASSESSMENT & PLAN NOTE
s/p total thyroidectomy on levothyroxine.   Pt currently taking levothyroxine 250mcg daily  TSH--pending

## 2022-05-19 ENCOUNTER — TELEPHONE (OUTPATIENT)
Dept: GYNECOLOGIC ONCOLOGY | Facility: CLINIC | Age: 52
End: 2022-05-19
Payer: MEDICAID

## 2022-05-19 NOTE — TELEPHONE ENCOUNTER
----- Message from So Rojas sent at 5/19/2022 10:47 AM CDT -----  Contact: [phone]/SELF  Type:  Patient Returning Call    Who Called:PT  Who Left Message for Patient:N/A  Does the patient know what this is regarding?:N/A  Would the patient rather a call back or a response via FAD ? [facility]? CALLBACK  Best Call Back Number:[phone]  Additional Information: N/A

## 2022-05-20 DIAGNOSIS — F41.9 ANXIETY: ICD-10-CM

## 2022-05-20 RX ORDER — ALPRAZOLAM 1 MG/1
.5-1 TABLET ORAL 2 TIMES DAILY PRN
Qty: 60 TABLET | Refills: 0 | Status: SHIPPED | OUTPATIENT
Start: 2022-05-20 | End: 2022-06-20 | Stop reason: SDUPTHER

## 2022-05-31 ENCOUNTER — CLINICAL SUPPORT (OUTPATIENT)
Dept: PULMONOLOGY | Facility: CLINIC | Age: 52
End: 2022-05-31
Payer: MEDICAID

## 2022-05-31 ENCOUNTER — OFFICE VISIT (OUTPATIENT)
Dept: PULMONOLOGY | Facility: CLINIC | Age: 52
End: 2022-05-31
Payer: MEDICAID

## 2022-05-31 VITALS
HEART RATE: 114 BPM | BODY MASS INDEX: 31.01 KG/M2 | BODY MASS INDEX: 31.01 KG/M2 | HEIGHT: 67 IN | RESPIRATION RATE: 18 BRPM | SYSTOLIC BLOOD PRESSURE: 110 MMHG | DIASTOLIC BLOOD PRESSURE: 68 MMHG | WEIGHT: 197.56 LBS | HEIGHT: 67 IN | OXYGEN SATURATION: 98 % | WEIGHT: 197.56 LBS

## 2022-05-31 DIAGNOSIS — J44.89 ASTHMA WITH COPD: ICD-10-CM

## 2022-05-31 DIAGNOSIS — Z72.0 TOBACCO ABUSE DISORDER: ICD-10-CM

## 2022-05-31 DIAGNOSIS — J44.89 ASTHMA WITH COPD: Primary | ICD-10-CM

## 2022-05-31 DIAGNOSIS — R09.02 EXERCISE HYPOXEMIA: ICD-10-CM

## 2022-05-31 DIAGNOSIS — E66.09 CLASS 1 OBESITY DUE TO EXCESS CALORIES WITH SERIOUS COMORBIDITY AND BODY MASS INDEX (BMI) OF 31.0 TO 31.9 IN ADULT: ICD-10-CM

## 2022-05-31 DIAGNOSIS — C77.3 MALIGNANT NEOPLASM METASTATIC TO LYMPH NODE OF AXILLA: ICD-10-CM

## 2022-05-31 PROCEDURE — 99212 OFFICE O/P EST SF 10 MIN: CPT | Mod: 25,S$PBB,, | Performed by: INTERNAL MEDICINE

## 2022-05-31 PROCEDURE — 3078F PR MOST RECENT DIASTOLIC BLOOD PRESSURE < 80 MM HG: ICD-10-PCS | Mod: CPTII,,, | Performed by: INTERNAL MEDICINE

## 2022-05-31 PROCEDURE — 3074F PR MOST RECENT SYSTOLIC BLOOD PRESSURE < 130 MM HG: ICD-10-PCS | Mod: CPTII,,, | Performed by: INTERNAL MEDICINE

## 2022-05-31 PROCEDURE — 99212 PR OFFICE/OUTPT VISIT, EST, LEVL II, 10-19 MIN: ICD-10-PCS | Mod: 25,S$PBB,, | Performed by: INTERNAL MEDICINE

## 2022-05-31 PROCEDURE — 94618 PULMONARY STRESS TESTING: ICD-10-PCS | Mod: 26,S$PBB,, | Performed by: INTERNAL MEDICINE

## 2022-05-31 PROCEDURE — 3008F PR BODY MASS INDEX (BMI) DOCUMENTED: ICD-10-PCS | Mod: CPTII,,, | Performed by: INTERNAL MEDICINE

## 2022-05-31 PROCEDURE — 3078F DIAST BP <80 MM HG: CPT | Mod: CPTII,,, | Performed by: INTERNAL MEDICINE

## 2022-05-31 PROCEDURE — 99999 PR PBB SHADOW E&M-EST. PATIENT-LVL V: ICD-10-PCS | Mod: PBBFAC,,, | Performed by: INTERNAL MEDICINE

## 2022-05-31 PROCEDURE — 99999 PR PBB SHADOW E&M-EST. PATIENT-LVL V: CPT | Mod: PBBFAC,,, | Performed by: INTERNAL MEDICINE

## 2022-05-31 PROCEDURE — 3074F SYST BP LT 130 MM HG: CPT | Mod: CPTII,,, | Performed by: INTERNAL MEDICINE

## 2022-05-31 PROCEDURE — 94618 PULMONARY STRESS TESTING: CPT | Mod: 26,S$PBB,, | Performed by: INTERNAL MEDICINE

## 2022-05-31 PROCEDURE — 99211 OFF/OP EST MAY X REQ PHY/QHP: CPT | Mod: PBBFAC,25

## 2022-05-31 PROCEDURE — 99215 OFFICE O/P EST HI 40 MIN: CPT | Mod: PBBFAC,27 | Performed by: INTERNAL MEDICINE

## 2022-05-31 PROCEDURE — 3008F BODY MASS INDEX DOCD: CPT | Mod: CPTII,,, | Performed by: INTERNAL MEDICINE

## 2022-05-31 PROCEDURE — 99999 PR PBB SHADOW E&M-EST. PATIENT-LVL I: ICD-10-PCS | Mod: PBBFAC,,,

## 2022-05-31 PROCEDURE — 99999 PR PBB SHADOW E&M-EST. PATIENT-LVL I: CPT | Mod: PBBFAC,,,

## 2022-05-31 PROCEDURE — 94618 PULMONARY STRESS TESTING: CPT | Mod: PBBFAC

## 2022-05-31 NOTE — PROCEDURES
"[facility] - Pulmonary Function  Six Minute Walk     SUMMARY     Ordering Provider: Dr. Rashid   Interpreting Provider: Dr. Rashid  Performing nurse/tech/RT: OZZIE Yang RRT  Diagnosis:  (asthma w/ copd)  Height: 5' 7" (170.2 cm)  Weight: 89.6 kg (197 lb 8.5 oz)  BMI (Calculated): 30.9   Patient Race:             Phase Oxygen Assessment Supplemental O2 Heart   Rate Blood Pressure Osmar Dyspnea Scale Rating   Resting 99 % Room Air 100 bpm 121/59 0   Exercise        Minute        1 99 % Room Air 129 bpm     2 99 % Room Air 132 bpm     3 99 % Room Air 132 bpm     4 98 % Room Air 134 bpm     5 99 % Room Air 135 bpm     6  99 % Room Air 134 bpm 133/61 3   Recovery        Minute        1 99 % Room Air 121 bpm     2 99 % Room Air 115 bpm     3 99 % Room Air 106 bpm     4 98 % Room Air 110 bpm 127/58 0     Six Minute Walk Summary  6MWT Status: completed without stopping  Patient Reported: Dyspnea     Interpretation:  Did the patient stop or pause?: No                                         Total Time Walked (Calculated): 360 seconds  Final Partial Lap Distance (feet): 150 feet  Total Distance Meters (Calculated): 228.6 meters  Predicted Distance Meters (Calculated): 514.6 meters  Percentage of Predicted (Calculated): 44.42  Peak VO2 (Calculated): 10.84  Mets: 3.1  Has The Patient Had a Previous Six Minute Walk Test?: Yes       Previous 6MWT Results  Has The Patient Had a Previous Six Minute Walk Test?: Yes  Date of Previous Test: 04/18/22  Total Time Walked: 360 seconds  Total Distance (meters): 312.42  Predicted Distance (meters): 512.77 meters  Percentage of Predicted: 60.93  Percent Change (Calculated): 0.27      Interpretation:  Total distance walked in six minutes is moderately reduced indicating a reduction in overall  functional capacity. Oxygen desaturation did not meet criteria for supplemental oxygen prescription.    Clinical correlation suggested.    [] Mild exercise-induced hypoxemia described as an arterial " oxygen saturation of 93-95% (or 3-4% less than at rest),  [] Moderate exercise-induced hypoxemia as 89-93%  [] Severe exercise induced hypoxemia as < 89% O2 saturation.  Medicare Criteria for oxygen prescription comments:   When arterial oxygen saturation is at or below 88% during exercise on room air (severe exercise induced hypoxemia) then the patient falls under Medicare Group 1 criteria for supplemental oxygen prescription.  Details about Medicare Group Criteria coverage can be found at http://www.cms.[facility].gov/manuals/downloads/   Marcio Rashid MD

## 2022-05-31 NOTE — PROGRESS NOTES
Subjective:     Patient ID: Josey Flores is a 51 y.o. female.    Chief Complaint:      HPI     Dyspnea  Patient complains of shortness of breath. Symptoms occur with one block walking. Symptoms began 6 months ago, unchanged since. Associated symptoms include  shortness of breath. She denies chest pain, located left chest. She does not have had recent travel. Weight has been stable. Symptoms are exacerbated by any exercise. Symptoms are alleviated by rest.     Heavy smoker, COPD, stage IV invasive breast carcinoma, history of thyroid carcinoma status post thyroidectomy 2017, cervical CA status post LEEP 2017.  Abnormal PET scan showing mediastinal right paratracheal and subcarinal lymph node with avidity.Station 7 EBUS TBNA biopsy showed malignancy in favor of cervical CA origin.    Past Medical History:   Diagnosis Date    Anxiety     Asthma     Breast cancer 2017    Cancer     right breast, metastatic-lymph nodes, bone, and thyroid     COPD (chronic obstructive pulmonary disease)     Depression     History of psychiatric hospitalization     2000; suicidal ideation    Hx of psychiatric care     Hypothyroidism     Miscarriage     five miscarriages    Obesity     Psychiatric problem     Thyroid cancer 2017     Past Surgical History:   Procedure Laterality Date    ABSCESS DRAINAGE      bilateral axilla    ADENOIDECTOMY      APPENDECTOMY      BREAST BIOPSY Right     x3    CHOLECYSTECTOMY      ENDOBRONCHIAL ULTRASOUND Bilateral 2/18/2021    Procedure: ENDOBRONCHIAL ULTRASOUND (EBUS);  Surgeon: Jaron Ni MD;  Location: [facility] ENDO;  Service: Pulmonary;  Laterality: Bilateral;    FLUOROSCOPY N/A 1/28/2021    Procedure: Mediport placement;  Surgeon: Noah Phelan MD;  Location: [facility] CATH LAB;  Service: General;  Laterality: N/A;    MASS EXCISION      MEDIPORT INSERTION, SINGLE Right 02/2021    SKIN GRAFT  06/16/2017    THYROIDECTOMY Bilateral     TONSILLECTOMY       Review of patient's  allergies indicates:   Allergen Reactions    Gabapentin Swelling     Note: unilateral joint edema, unclear if due to gabapentin    Nsaids (non-steroidal anti-inflammatory drug) Other (See Comments)     Instructed not to take NSAID drugs with chemo pill.    Clindamycin Rash    Vancomycin analogues Itching     Current Outpatient Medications on File Prior to Visit   Medication Sig Dispense Refill    albuterol (PROVENTIL) 2.5 mg /3 mL (0.083 %) nebulizer solution Take 3 mLs (2.5 mg total) by nebulization every 4 to 6 hours as needed for Wheezing or Shortness of Breath. 360 mL 11    albuterol (PROVENTIL/VENTOLIN HFA) 90 mcg/actuation inhaler Inhale 2 puffs into the lungs every 4 (four) hours as needed for Wheezing or Shortness of Breath. 18 g 11    ALPRAZolam (XANAX) 1 MG tablet Take 0.5-1 tablets (0.5-1 mg total) by mouth 2 (two) times daily as needed for Anxiety. 60 tablet 0    buPROPion (WELLBUTRIN SR) 150 MG TBSR 12 hr tablet Take 1 tablet (150 mg total) by mouth 2 (two) times daily. Take 1 tablet (150mg) once daily for 1 week then increase to twice daily 60 tablet 0    calcium carbonate 400 mg calcium (1,000 mg) Chew Take 2,000 mg by mouth once daily.      cyanocobalamin (VITAMIN B-12) 1000 MCG tablet Take 1 tablet (1,000 mcg total) by mouth once daily. 90 tablet 3    dexAMETHasone (DECADRON) 4 MG Tab Take 2 tablets (8 mg total) by mouth once daily. Take as directed on days 2, 3, and 4 of your chemotherapy cycle. 24 tablet 0    diphenoxylate-atropine 2.5-0.025 mg (LOMOTIL) 2.5-0.025 mg per tablet Take 1 tablet by mouth 4 (four) times daily as needed for Diarrhea. 30 tablet 1    ergocalciferol (VITAMIN D2) 50,000 unit Cap Take 5,000 Units by mouth once daily at 6am.       HYDROcodone-acetaminophen (NORCO)  mg per tablet Take 1 tablet by mouth every 4 (four) hours as needed for Pain. No more than 4 doses/ day 180 tablet 0    hydrOXYzine HCL (ATARAX) 25 MG tablet Take 1 tablet (25 mg total) by  mouth 3 (three) times daily as needed for Itching. 90 tablet 1    ipratropium (ATROVENT) 42 mcg (0.06 %) nasal spray 2 sprays by Nasal route 4 (four) times daily. As needed for rhinitis. Take in evening before bed and in the morning 15 mL 11    letrozole (FEMARA) 2.5 mg Tab Take 1 tablet (2.5 mg) by mouth once daily. 90 tablet 3    levothyroxine (EUTHYROX) 200 MCG tablet Take 1 tablet (200 mcg total) by mouth once daily. 30 tablet 2    levothyroxine (SYNTHROID) 75 MCG tablet Take 1 tablet (75 mcg total) by mouth once daily. 30 tablet 11    morphine (MS CONTIN) 30 MG 12 hr tablet Take 1 tablet (30 mg total) by mouth every 12 (twelve) hours. 60 tablet 0    multivitamin (THERAGRAN) per tablet Take 1 tablet by mouth once daily.      OLANZapine (ZYPREXA) 5 MG tablet Take 1 tablet (5 mg total) by mouth every evening. Take as directed on days 1-4 of your chemotherapy cycle. May increase to 10 mg if breakthrough nausea occurs. 30 tablet 2    ondansetron (ZOFRAN-ODT) 8 MG TbDL Take 1 tablet (8 mg total) by mouth every 8 (eight) hours as needed (nausea/vomiting). 60 tablet 5    palbociclib (IBRANCE) 125 mg Cap Take one capsule (125 mg) by mouth once daily on days 1-21 of each 28-day cycle. 21 capsule 11    phenazopyridine (PYRIDIUM) 200 MG tablet Take 200 mg by mouth 3 (three) times daily.      potassium chloride SA (K-DUR,KLOR-CON) 20 MEQ tablet Take 1 tablet (20 mEq total) by mouth once daily. Take daily for 3 days. 3 tablet 1    amoxicillin (AMOXIL) 875 MG tablet Take 875 mg by mouth 2 (two) times daily.      nitrofurantoin, macrocrystal-monohydrate, (MACROBID) 100 MG capsule Take 100 mg by mouth 2 (two) times daily.       No current facility-administered medications on file prior to visit.     Social History     Socioeconomic History    Marital status:     Number of children: 2   Tobacco Use    Smoking status: Current Every Day Smoker     Packs/day: 1.00     Years: 32.00     Pack years: 32.00      "Types: Cigarettes     Start date: 1985     Last attempt to quit: 2017     Years since quittin.9    Smokeless tobacco: Never Used    Tobacco comment: 45 pack year history   Substance and Sexual Activity    Alcohol use: No    Drug use: No    Sexual activity: Yes     Partners: Male   Social History Narrative     with two adult children; common law marriage (10+years); raised [employer], no current [employer] practice     Family History   Problem Relation Age of Onset    Arthritis Mother     Early death Mother         64 at time of death    Heart attack Mother     Heart disease Mother     Heart failure Mother     Hypertension Mother     Coronary artery disease Father     Hearing loss Father     Heart disease Father     Hyperlipidemia Father     Hypertension Father     Mental illness Father     Learning disabilities Son     Cancer Maternal Aunt        Review of Systems   Constitutional: Positive for fatigue. Negative for fever.   HENT: Positive for postnasal drip, rhinorrhea and congestion.    Eyes: Negative for redness and itching.   Respiratory: Positive for cough, sputum production, shortness of breath, dyspnea on extertion, use of rescue inhaler and Paroxysmal Nocturnal Dyspnea.    Cardiovascular: Negative for chest pain, palpitations and leg swelling.   Genitourinary: Negative for difficulty urinating and hematuria.   Endocrine: Negative for cold intolerance and heat intolerance.    Skin: Negative for rash.   Gastrointestinal: Negative for nausea and abdominal pain.   Neurological: Negative for dizziness, syncope, weakness and light-headedness.   Hematological: Negative for adenopathy. Does not bruise/bleed easily.   Psychiatric/Behavioral: Negative for sleep disturbance. The patient is not nervous/anxious.        Objective:      /68   Pulse (!) 114   Resp 18   Ht 5' 7" (1.702 m)   Wt 89.6 kg (197 lb 8.5 oz)   LMP 2020 Comment: no cycle x3 years  SpO2 98%   BMI " 30.94 kg/m²   Physical Exam  Vitals and nursing note reviewed.   Constitutional:       Appearance: She is well-developed.   HENT:      Head: Normocephalic and atraumatic.      Nose: Congestion present.      Mouth/Throat:      Pharynx: No oropharyngeal exudate.   Eyes:      Conjunctiva/sclera: Conjunctivae normal.      Pupils: Pupils are equal, round, and reactive to light.   Neck:      Thyroid: No thyromegaly.      Vascular: No JVD.      Trachea: No tracheal deviation.   Cardiovascular:      Rate and Rhythm: Normal rate and regular rhythm.      Heart sounds: Normal heart sounds.   Pulmonary:      Effort: Pulmonary effort is normal. No respiratory distress.      Breath sounds: Examination of the right-lower field reveals wheezing. Examination of the left-lower field reveals wheezing. Decreased breath sounds and wheezing present. No rhonchi or rales.   Chest:      Chest wall: No tenderness.   Abdominal:      General: Bowel sounds are normal.      Palpations: Abdomen is soft.   Musculoskeletal:         General: Normal range of motion.      Cervical back: Neck supple.   Lymphadenopathy:      Cervical: No cervical adenopathy.   Skin:     General: Skin is warm and dry.   Neurological:      Mental Status: She is alert and oriented to person, place, and time.       Personal Diagnostic Review  Chest x-ray: stable    Pulmonary Studies Review 6/2/2022   SpO2 100   Peak Flow -   Ordering Provider -   Interpreting Provider -   Performing nurse/tech/RT -   Diagnosis -   Height -   Weight -   BMI (Calculated) -   Predicted Distance -   Patient Race -   6MWT Status -   Patient Reported -   Was O2 used? -   6MW Distance walked (feet) -   Distance walked (meters) -   Did patient stop? -   Type of assistive device(s) used? -   Is extra documentation required for this patient? -   Oxygen Saturation -   Supplemental Oxygen -   Heart Rate -   Blood Pressure -   Osmar Dyspnea Rating  -   Oxygen Saturation -   Supplemental Oxygen -   Heart  Rate -   Blood Pressure -   Osmar Dyspnea Rating  -   Recovery Time (seconds) -   Oxygen Saturation -   Supplemental Oxygen -   Heart Rate -   Blood Pressure -   Osmar Dyspnea Rating  -   Is procedure ready for interpretation? -   Did the patient stop or pause? -   Total Time Walked (Calculated) -   Total Laps Walked -   Final Partial Lap Distance (feet) -   Total Distance Feet (Calculated) -   Total Distance Meters (Calculated) -   Predicted Distance Meters (Calculated) -   Percentage of Predicted (Calculated) -   Peak VO2 (Calculated) -   Mets -   Has The Patient Had a Previous Six Minute Walk Test? -   Oxygen Qualification? -       X-Ray Chest PA And Lateral  Narrative: EXAMINATION:  XR CHEST PA AND LATERAL    CLINICAL HISTORY:  Chronic obstructive pulmonary disease, unspecified    TECHNIQUE:  PA and lateral views of the chest were performed.    COMPARISON:  Prior radiographs    FINDINGS:  Cardiac silhouette and mediastinal contours are unchanged.  Right-sided MediPort remains.  Lungs are clear.  Osseous structures are intact.  Impression: No acute cardiopulmonary process.    Electronically signed by: Colten Llanos MD  Date:    04/18/2022  Time:    09:18      Office Spirometry Results:     No flowsheet data found.  Pulmonary Studies Review 6/2/2022   SpO2 100   Peak Flow -   Ordering Provider -   Interpreting Provider -   Performing nurse/tech/RT -   Diagnosis -   Height -   Weight -   BMI (Calculated) -   Predicted Distance -   Patient Race -   6MWT Status -   Patient Reported -   Was O2 used? -   6MW Distance walked (feet) -   Distance walked (meters) -   Did patient stop? -   Type of assistive device(s) used? -   Is extra documentation required for this patient? -   Oxygen Saturation -   Supplemental Oxygen -   Heart Rate -   Blood Pressure -   Osmar Dyspnea Rating  -   Oxygen Saturation -   Supplemental Oxygen -   Heart Rate -   Blood Pressure -   Osmar Dyspnea Rating  -   Recovery Time (seconds) -   Oxygen  Saturation -   Supplemental Oxygen -   Heart Rate -   Blood Pressure -   Osmar Dyspnea Rating  -   Is procedure ready for interpretation? -   Did the patient stop or pause? -   Total Time Walked (Calculated) -   Total Laps Walked -   Final Partial Lap Distance (feet) -   Total Distance Feet (Calculated) -   Total Distance Meters (Calculated) -   Predicted Distance Meters (Calculated) -   Percentage of Predicted (Calculated) -   Peak VO2 (Calculated) -   Mets -   Has The Patient Had a Previous Six Minute Walk Test? -   Oxygen Qualification? -         Assessment:       Class 1 obesity due to excess calories with serious comorbidity and body mass index (BMI) of 31.0 to 31.9 in adult  Last documentation =       BMI noted to be elevated. Changes in weight over time reviewed in chart (if available) and by patient recollection. Patient advised and counseled concerning the adverse medical consequences of obesity. Treatment options discussed - calorie restriction, increasing calorie expenditure, medical and surgical options noted.  The patient's severe obesity was monitored, evaluated, addressed and/or treated.   2013 AHA/ACC/TOS guideline for the management of overweight and obesity in adults: a report of the American College of Cardiology/American Heart Association Task Force on Practice Guidelines and The Obesity Society      Asthma with COPD  -     Spirometry with/without bronchodilator; Future; Expected date: 12/01/2022  -     X-Ray Chest PA And Lateral; Future; Expected date: 05/31/2022    Tobacco abuse disorder    Class 1 obesity due to excess calories with serious comorbidity and body mass index (BMI) of 31.0 to 31.9 in adult    Malignant neoplasm metastatic to lymph node of axilla          Outpatient Encounter Medications as of 5/31/2022   Medication Sig Dispense Refill    albuterol (PROVENTIL) 2.5 mg /3 mL (0.083 %) nebulizer solution Take 3 mLs (2.5 mg total) by nebulization every 4 to 6 hours as needed for  Wheezing or Shortness of Breath. 360 mL 11    albuterol (PROVENTIL/VENTOLIN HFA) 90 mcg/actuation inhaler Inhale 2 puffs into the lungs every 4 (four) hours as needed for Wheezing or Shortness of Breath. 18 g 11    ALPRAZolam (XANAX) 1 MG tablet Take 0.5-1 tablets (0.5-1 mg total) by mouth 2 (two) times daily as needed for Anxiety. 60 tablet 0    buPROPion (WELLBUTRIN SR) 150 MG TBSR 12 hr tablet Take 1 tablet (150 mg total) by mouth 2 (two) times daily. Take 1 tablet (150mg) once daily for 1 week then increase to twice daily 60 tablet 0    calcium carbonate 400 mg calcium (1,000 mg) Chew Take 2,000 mg by mouth once daily.      cyanocobalamin (VITAMIN B-12) 1000 MCG tablet Take 1 tablet (1,000 mcg total) by mouth once daily. 90 tablet 3    dexAMETHasone (DECADRON) 4 MG Tab Take 2 tablets (8 mg total) by mouth once daily. Take as directed on days 2, 3, and 4 of your chemotherapy cycle. 24 tablet 0    diphenoxylate-atropine 2.5-0.025 mg (LOMOTIL) 2.5-0.025 mg per tablet Take 1 tablet by mouth 4 (four) times daily as needed for Diarrhea. 30 tablet 1    ergocalciferol (VITAMIN D2) 50,000 unit Cap Take 5,000 Units by mouth once daily at 6am.       HYDROcodone-acetaminophen (NORCO)  mg per tablet Take 1 tablet by mouth every 4 (four) hours as needed for Pain. No more than 4 doses/ day 180 tablet 0    hydrOXYzine HCL (ATARAX) 25 MG tablet Take 1 tablet (25 mg total) by mouth 3 (three) times daily as needed for Itching. 90 tablet 1    ipratropium (ATROVENT) 42 mcg (0.06 %) nasal spray 2 sprays by Nasal route 4 (four) times daily. As needed for rhinitis. Take in evening before bed and in the morning 15 mL 11    letrozole (FEMARA) 2.5 mg Tab Take 1 tablet (2.5 mg) by mouth once daily. 90 tablet 3    levothyroxine (EUTHYROX) 200 MCG tablet Take 1 tablet (200 mcg total) by mouth once daily. 30 tablet 2    levothyroxine (SYNTHROID) 75 MCG tablet Take 1 tablet (75 mcg total) by mouth once daily. 30 tablet 11     morphine (MS CONTIN) 30 MG 12 hr tablet Take 1 tablet (30 mg total) by mouth every 12 (twelve) hours. 60 tablet 0    multivitamin (THERAGRAN) per tablet Take 1 tablet by mouth once daily.      OLANZapine (ZYPREXA) 5 MG tablet Take 1 tablet (5 mg total) by mouth every evening. Take as directed on days 1-4 of your chemotherapy cycle. May increase to 10 mg if breakthrough nausea occurs. 30 tablet 2    ondansetron (ZOFRAN-ODT) 8 MG TbDL Take 1 tablet (8 mg total) by mouth every 8 (eight) hours as needed (nausea/vomiting). 60 tablet 5    palbociclib (IBRANCE) 125 mg Cap Take one capsule (125 mg) by mouth once daily on days 1-21 of each 28-day cycle. 21 capsule 11    phenazopyridine (PYRIDIUM) 200 MG tablet Take 200 mg by mouth 3 (three) times daily.      potassium chloride SA (K-DUR,KLOR-CON) 20 MEQ tablet Take 1 tablet (20 mEq total) by mouth once daily. Take daily for 3 days. 3 tablet 1    amoxicillin (AMOXIL) 875 MG tablet Take 875 mg by mouth 2 (two) times daily.      nitrofurantoin, macrocrystal-monohydrate, (MACROBID) 100 MG capsule Take 100 mg by mouth 2 (two) times daily.       No facility-administered encounter medications on file as of 5/31/2022.     Plan:       Requested Prescriptions      No prescriptions requested or ordered in this encounter     Problem List Items Addressed This Visit     Class 1 obesity due to excess calories with serious comorbidity and body mass index (BMI) of 31.0 to 31.9 in adult    Current Assessment & Plan     Last documentation =       BMI noted to be elevated. Changes in weight over time reviewed in chart (if available) and by patient recollection. Patient advised and counseled concerning the adverse medical consequences of obesity. Treatment options discussed - calorie restriction, increasing calorie expenditure, medical and surgical options noted.  The patient's severe obesity was monitored, evaluated, addressed and/or treated.   2013 AHA/ACC/TOS guideline for the  management of overweight and obesity in adults: a report of the American College of Cardiology/American Heart Association Task Force on Practice Guidelines and The Obesity Society             Malignant neoplasm metastatic to lymph node of axilla    Tobacco abuse disorder      Other Visit Diagnoses     Asthma with COPD    -  Primary    Relevant Orders    Spirometry with/without bronchodilator    X-Ray Chest PA And Lateral             Follow up in about 1 year (around 5/31/2023) for Review CXR - on return, Review dago - on return.    MEDICAL DECISION MAKING: Moderate to high complexity.  Overall, the multiple problems listed are of moderate to high severity that may impact quality of life and activities of daily living. Side effects of medications, treatment plan as well as options and alternatives reviewed and discussed with patient. There was counseling of patient concerning these issues.    Total time spent in counseling and coordination of care - 15  minutes of total time spent on the encounter, which includes face to face time and non-face to face time preparing to see the patient (eg, review of tests), Obtaining and/or reviewing separately obtained history, Documenting clinical information in the electronic or other health record, Independently interpreting results (not separately reported) and communicating results to the patient/family/caregiver, or Care coordination (not separately reported).    Time was used in discussion of prognosis, risks, benefits of treatment, instructions and compliance with regimen . Discussion with other physicians and/or health care providers - home health or for use of durable medical equipment (oxygen, nebulizers, CPAP, BiPAP) occurred.

## 2022-06-02 ENCOUNTER — OFFICE VISIT (OUTPATIENT)
Dept: HEMATOLOGY/ONCOLOGY | Facility: CLINIC | Age: 52
End: 2022-06-02
Payer: MEDICAID

## 2022-06-02 ENCOUNTER — DOCUMENTATION ONLY (OUTPATIENT)
Dept: HEMATOLOGY/ONCOLOGY | Facility: CLINIC | Age: 52
End: 2022-06-02

## 2022-06-02 ENCOUNTER — INFUSION (OUTPATIENT)
Dept: INFUSION THERAPY | Facility: HOSPITAL | Age: 52
End: 2022-06-02
Attending: INTERNAL MEDICINE
Payer: MEDICAID

## 2022-06-02 VITALS
HEART RATE: 88 BPM | OXYGEN SATURATION: 100 % | TEMPERATURE: 98 F | BODY MASS INDEX: 31.23 KG/M2 | HEIGHT: 67 IN | RESPIRATION RATE: 16 BRPM | WEIGHT: 199 LBS | SYSTOLIC BLOOD PRESSURE: 120 MMHG | DIASTOLIC BLOOD PRESSURE: 73 MMHG

## 2022-06-02 VITALS
OXYGEN SATURATION: 99 % | HEIGHT: 67 IN | SYSTOLIC BLOOD PRESSURE: 127 MMHG | BODY MASS INDEX: 31.31 KG/M2 | DIASTOLIC BLOOD PRESSURE: 95 MMHG | TEMPERATURE: 98 F | WEIGHT: 199.5 LBS | HEART RATE: 105 BPM

## 2022-06-02 DIAGNOSIS — Z17.0 MALIGNANT NEOPLASM OF OVERLAPPING SITES OF RIGHT BREAST IN FEMALE, ESTROGEN RECEPTOR POSITIVE: ICD-10-CM

## 2022-06-02 DIAGNOSIS — C50.811 MALIGNANT NEOPLASM OF OVERLAPPING SITES OF RIGHT BREAST IN FEMALE, ESTROGEN RECEPTOR POSITIVE: ICD-10-CM

## 2022-06-02 DIAGNOSIS — C79.51 SECONDARY CANCER OF BONE: ICD-10-CM

## 2022-06-02 DIAGNOSIS — C53.0 MALIGNANT NEOPLASM OF ENDOCERVIX: Primary | ICD-10-CM

## 2022-06-02 DIAGNOSIS — C77.3 MALIGNANT NEOPLASM METASTATIC TO LYMPH NODE OF AXILLA: ICD-10-CM

## 2022-06-02 DIAGNOSIS — C73 PAPILLARY THYROID CARCINOMA: Primary | ICD-10-CM

## 2022-06-02 PROCEDURE — 96367 TX/PROPH/DG ADDL SEQ IV INF: CPT

## 2022-06-02 PROCEDURE — 96375 TX/PRO/DX INJ NEW DRUG ADDON: CPT

## 2022-06-02 PROCEDURE — 96413 CHEMO IV INFUSION 1 HR: CPT

## 2022-06-02 PROCEDURE — 99215 OFFICE O/P EST HI 40 MIN: CPT | Mod: S$PBB,,, | Performed by: INTERNAL MEDICINE

## 2022-06-02 PROCEDURE — 3080F PR MOST RECENT DIASTOLIC BLOOD PRESSURE >= 90 MM HG: ICD-10-PCS | Mod: CPTII,,, | Performed by: INTERNAL MEDICINE

## 2022-06-02 PROCEDURE — 96417 CHEMO IV INFUS EACH ADDL SEQ: CPT

## 2022-06-02 PROCEDURE — 96415 CHEMO IV INFUSION ADDL HR: CPT

## 2022-06-02 PROCEDURE — 25000003 PHARM REV CODE 250: Performed by: INTERNAL MEDICINE

## 2022-06-02 PROCEDURE — 99999 PR PBB SHADOW E&M-EST. PATIENT-LVL IV: ICD-10-PCS | Mod: PBBFAC,,, | Performed by: INTERNAL MEDICINE

## 2022-06-02 PROCEDURE — 99215 PR OFFICE/OUTPT VISIT, EST, LEVL V, 40-54 MIN: ICD-10-PCS | Mod: S$PBB,,, | Performed by: INTERNAL MEDICINE

## 2022-06-02 PROCEDURE — 3074F PR MOST RECENT SYSTOLIC BLOOD PRESSURE < 130 MM HG: ICD-10-PCS | Mod: CPTII,,, | Performed by: INTERNAL MEDICINE

## 2022-06-02 PROCEDURE — 3074F SYST BP LT 130 MM HG: CPT | Mod: CPTII,,, | Performed by: INTERNAL MEDICINE

## 2022-06-02 PROCEDURE — 3080F DIAST BP >= 90 MM HG: CPT | Mod: CPTII,,, | Performed by: INTERNAL MEDICINE

## 2022-06-02 PROCEDURE — 3008F PR BODY MASS INDEX (BMI) DOCUMENTED: ICD-10-PCS | Mod: CPTII,,, | Performed by: INTERNAL MEDICINE

## 2022-06-02 PROCEDURE — 99214 OFFICE O/P EST MOD 30 MIN: CPT | Mod: PBBFAC,25 | Performed by: INTERNAL MEDICINE

## 2022-06-02 PROCEDURE — 99999 PR PBB SHADOW E&M-EST. PATIENT-LVL IV: CPT | Mod: PBBFAC,,, | Performed by: INTERNAL MEDICINE

## 2022-06-02 PROCEDURE — 3008F BODY MASS INDEX DOCD: CPT | Mod: CPTII,,, | Performed by: INTERNAL MEDICINE

## 2022-06-02 PROCEDURE — 63600175 PHARM REV CODE 636 W HCPCS: Performed by: INTERNAL MEDICINE

## 2022-06-02 RX ORDER — SODIUM CHLORIDE 0.9 % (FLUSH) 0.9 %
10 SYRINGE (ML) INJECTION
Status: CANCELLED | OUTPATIENT
Start: 2022-06-02

## 2022-06-02 RX ORDER — FAMOTIDINE 10 MG/ML
20 INJECTION INTRAVENOUS
Status: CANCELLED | OUTPATIENT
Start: 2022-06-02

## 2022-06-02 RX ORDER — HEPARIN 100 UNIT/ML
500 SYRINGE INTRAVENOUS
Status: CANCELLED | OUTPATIENT
Start: 2022-06-02

## 2022-06-02 RX ORDER — DIPHENHYDRAMINE HYDROCHLORIDE 50 MG/ML
50 INJECTION INTRAMUSCULAR; INTRAVENOUS ONCE AS NEEDED
Status: CANCELLED | OUTPATIENT
Start: 2022-06-02

## 2022-06-02 RX ORDER — EPINEPHRINE 0.3 MG/.3ML
0.3 INJECTION SUBCUTANEOUS ONCE AS NEEDED
Status: CANCELLED | OUTPATIENT
Start: 2022-06-02

## 2022-06-02 RX ORDER — LORAZEPAM 2 MG/ML
0.5 INJECTION INTRAMUSCULAR
Status: CANCELLED
Start: 2022-06-02 | End: 2022-06-02

## 2022-06-02 RX ORDER — HEPARIN 100 UNIT/ML
500 SYRINGE INTRAVENOUS
Status: DISCONTINUED | OUTPATIENT
Start: 2022-06-02 | End: 2022-06-02 | Stop reason: HOSPADM

## 2022-06-02 RX ORDER — FAMOTIDINE 10 MG/ML
20 INJECTION INTRAVENOUS
Status: COMPLETED | OUTPATIENT
Start: 2022-06-02 | End: 2022-06-02

## 2022-06-02 RX ADMIN — APREPITANT 130 MG: 130 INJECTION, EMULSION INTRAVENOUS at 12:06

## 2022-06-02 RX ADMIN — SODIUM CHLORIDE 200 MG: 9 INJECTION, SOLUTION INTRAVENOUS at 11:06

## 2022-06-02 RX ADMIN — SODIUM CHLORIDE 525 MG: 9 INJECTION, SOLUTION INTRAVENOUS at 04:06

## 2022-06-02 RX ADMIN — LORAZEPAM 0.5 MG: 2 INJECTION INTRAMUSCULAR; INTRAVENOUS at 12:06

## 2022-06-02 RX ADMIN — PALONOSETRON HYDROCHLORIDE: 0.25 INJECTION, SOLUTION INTRAVENOUS at 12:06

## 2022-06-02 RX ADMIN — SODIUM CHLORIDE: 9 INJECTION, SOLUTION INTRAVENOUS at 11:06

## 2022-06-02 RX ADMIN — PACLITAXEL 366 MG: 6 INJECTION, SOLUTION INTRAVENOUS at 01:06

## 2022-06-02 RX ADMIN — DIPHENHYDRAMINE HYDROCHLORIDE 50 MG: 50 INJECTION, SOLUTION INTRAMUSCULAR; INTRAVENOUS at 12:06

## 2022-06-02 RX ADMIN — HEPARIN 500 UNITS: 100 SYRINGE at 05:06

## 2022-06-02 RX ADMIN — FAMOTIDINE 20 MG: 10 INJECTION, SOLUTION INTRAVENOUS at 12:06

## 2022-06-02 NOTE — DISCHARGE INSTRUCTIONS
.[facility]  [address] [facility]  [address]  [phone] phone     [phone] fax  Hours of Operation: Monday- Friday 8:00am- 5:00pm  After hours phone  [phone]  Hematology / Oncology Physicians on call    Dr. Jake Marie      Nurse Practitioners:    Virginie Garrison, VINNY Sy, VINNY Dee, VINNY Prieto, VINNY Parson, PA      Please don't hesitate to call if you have any concerns.    .FALL PREVENTION   Falls often occur due to slipping, tripping or losing your balance. Here are ways to reduce your risk of falling again.   Was there anything that caused your fall that can be fixed, removed or replaced?   Make your home safe by keeping walkways clear of objects you may trip over.   Use non-slip pads under rugs.   Do not walk in poorly lit areas.   Do not stand on chairs or wobbly ladders.   Use caution when reaching overhead or looking upward. This position can cause a loss of balance.   Be sure your shoes fit properly, have non-slip bottoms and are in good condition.   Be cautious when going up and down stairs, curbs, and when walking on uneven sidewalks.   If your balance is poor, consider using a cane or walker.   If your fall was related to alcohol use, stop or limit alcohol intake.   If your fall was related to use of sleeping medicines, talk to your doctor about this. You may need to reduce your dosage at bedtime if you awaken during the night to go to the bathroom.   To reduce the need for nighttime bathroom trips:   Avoid drinking fluids for several hours before going to bed   Empty your bladder before going to bed   Men can keep a urinal at the bedside   © 3875-2131 [facility], [address] [place] [address], [place] [address]. All rights reserved. This information is not intended as a substitute for professional medical care. Always follow your healthcare professional's instructions.  .WAYS  TO HELP PREVENT INFECTION        WASH YOUR HANDS OFTEN DURING THE DAY, ESPECIALLY BEFORE YOU EAT, AFTER USING THE BATHROOM, AND AFTER TOUCHING ANIMALS    STAY AWAY FROM PEOPLE WHO HAVE ILLNESSES YOU CAN CATCH; SUCH AS COLDS, FLU, CHICKEN POX    TRY TO AVOID CROWDS    STAY AWAY FROM CHILDREN WHO RECENTLY HAVE RECEIVED LIVE VIRUS VACCINES    MAINTAIN GOOD MOUTH CARE    DO NOT SQUEEZE OR SCRATCH PIMPLES    CLEAN CUTS & SCRAPES RIGHT AWAY AND DAILY UNTIL HEALED WITH WARM WATER, SOAP & AN ANTISEPTIC    AVOID CONTACT WITH LITTER BOXES, BIRD CAGES, & FISH TANKS    AVOID STANDING WATER, IE., BIRD BATHS, FLOWER POTS/VASES, OR HUMIDIFIERS    WEAR GLOVES WHEN GARDENING OR CLEANING UP AFTER OTHERS, ESPECIALLY BABIES & SMALL CHILDREN    DO NOT EAT RAW FISH, SEAFOOD, MEAT, OR EGGS

## 2022-06-02 NOTE — PLAN OF CARE
Problem: Adult Inpatient Plan of Care  Goal: Plan of Care Review  Outcome: Ongoing, Progressing  Flowsheets (Taken 6/2/2022 1223)  Plan of Care Reviewed With:   patient   spouse  Goal: Patient-Specific Goal (Individualized)  Outcome: Ongoing, Progressing  Flowsheets (Taken 6/2/2022 1223)  Anxieties, Fears or Concerns: Pt is concerned due to confusion whether or not she is supposed to receive Keytruda. After research she is getting Keytruda today. Pt states understanding and wants to get it today.  Individualized Care Needs: feet elevated, warm blanket given and snacks offered  Patient-Specific Goals (Include Timeframe): pt will tolerate chemo infusion with no adverse reaction  Goal: Optimal Comfort and Wellbeing  Outcome: Ongoing, Progressing     Problem: Fall Injury Risk  Goal: Absence of Fall and Fall-Related Injury  Outcome: Ongoing, Progressing     Problem: Neutropenia (Chemotherapy Effects)  Goal: Absence of Infection  Outcome: Ongoing, Progressing     Problem: Infection  Goal: Absence of Infection Signs and Symptoms  Outcome: Ongoing, Progressing     Problem: Anemia (Chemotherapy Effects)  Goal: Anemia Symptom Improvement  Outcome: Ongoing, Progressing     Problem: Nausea and Vomiting (Chemotherapy Effects)  Goal: Fluid and Electrolyte Balance  Outcome: Ongoing, Progressing     Problem: Neurotoxicity (Chemotherapy Effects)  Goal: Neurotoxicity Symptom Control  Outcome: Ongoing, Progressing     Problem: Oral Mucositis (Chemotherapy Effects)  Goal: Improved Oral Mucous Membrane Integrity  Outcome: Ongoing, Progressing     Problem: Thrombocytopenia Bleeding Risk (Chemotherapy Effects)  Goal: Absence of Bleeding  Outcome: Ongoing, Progressing      Preparation Of Recipient Site - Graft: The eschar was removed surgically with sharp dissection to facilitate appropriate survival of the following graft.

## 2022-06-02 NOTE — PROGRESS NOTES
"Pt. requested to meet with swer on today's date. Swer met with pt. Pt.'s  was also present. Pt. inquired about gas cards. Swer reported that the gas cards have not yet arrived. Pt. stated "They said they would be in this month." Swer informed pt. that the gas card company had a mix up on order which has delayed gas card arrival. Pt. verbalized understanding. Swer will remain available.     "

## 2022-06-02 NOTE — PROGRESS NOTES
Subjective:       Patient ID: Josey Flores is a 51 y.o. female.    Chief Complaint: No chief complaint on file.    HPI     Mrs. Flores presents today for follow-up.  This is the first time that I am seeing her.  Briefly, she is a 51-year-old female who is currently being treated for metastatic cervical cancer with carboplatin, Taxol, and pembrolizumab..  She also has a history of metastatic breast cancer, currently on letrozole, and papillary thyroid cancer, status post thyroidectomy.  She is on thyroxine replacement therapy and the current dose is 275 mcg daily.  She was last seen by Lorelei on April 7, 2022.  I have received reviewed his note. She was also seen by our our nurse practitioner on May 12, 2022. I have reviewed her note as well.     Her CBC today shows a white count of 6,200 per cubic mm, hemoglobin 10.7 grams/deciliter, hematocrit 32.8% and platelets 220,000 per cubic mm.  MCV is 102. Creatinine is 1.2 mg/dL, calcium is 9.8 mg/dL and LFTs are normal.  Bilirubin is 0.3 mg/dL.      Her TSH is pending.    Review of Systems    Overall she feels fair.  She complains of fatigue and generalized weakness.  She also complains of poor appetite.  In addition, she complains of intermittent suprapubic pain.  She appears somewhat anxious but she denies any depression, easy bruising, fevers, chills, night  sweats, weight loss,  vomiting, diarrhea, constipation, diplopia, blurred vision, headache, chest pain, palpitations,  breast pain, upper abdominal pain, extremity pain, or difficulty ambulating.  The remainder of the ten-point ROS, including general, skin, lymph, H/N, cardiorespiratory, GI, , Neuro, Endocrine, and psychiatric is negative.     Objective:      Physical Exam      She is alert, oriented to time, place, person, pleasant, well      nourished, in no acute physical distress.   She is here with her .                                   VITAL SIGNS:  Reviewed                                       HEENT:  Normal.  There are no nasal, oral, lip, gingival, auricular, lid,    or conjunctival lesions.  Mucosae are moist and pink, and there is no        thrush.  Pupils are equal, reactive to light and accommodation.              Extraocular muscle movements are intact.  She has a dental is.  There is no frontal or maxillary tenderness.                                     NECK:  Supple without JVD, adenopathy, or thyromegaly.                       LUNGS:  Clear to auscultation without wheezing, rales, or rhonchi.           CARDIOVASCULAR:  Reveals an S1, S2, no murmurs, no rubs, no gallops.         ABDOMEN:  Soft, nontender, without organomegaly.  Bowel sounds are    present.                                                                     EXTREMITIES:  No cyanosis, clubbing, or edema.                               BREASTS:  No masses are palpated.                                     LYMPHATIC:  There is no cervical, axillary, or supraclavicular adenopathy.   SKIN:  Warm and moist, without petechiae, rashes, induration, or ecchymoses.           NEUROLOGIC:  DTRs are 0-1+ bilaterally, symmetrical, motor function is 5/5,  and cranial nerves are  within normal limits.    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 Flores will proceed with the 6th round of chemo immunotherapy today.  I have read Dr. Asencio's note of 3/10/2022; she is supposed to be in maintenance zolendronic acid every 2-3 months and was supposed to receive a dose in April which she did not.  We will administer a dose today.  She will return in 3 weeks with repeat labs including TFTs to see Dr. Asencio.  She  will need to be restaged after her next visit.  Her multiple questions were answered to her satisfaction.      11:48 a.m. ADDENDUM  It appears that she received an infusion zoledronic acid during her last visit 4 weeks ago.  Therefore, her next infusion will be administered in 8 weeks.

## 2022-06-03 ENCOUNTER — TELEPHONE (OUTPATIENT)
Dept: HEMATOLOGY/ONCOLOGY | Facility: CLINIC | Age: 52
End: 2022-06-03
Payer: MEDICAID

## 2022-06-03 PROBLEM — E66.811 CLASS 1 OBESITY DUE TO EXCESS CALORIES WITH SERIOUS COMORBIDITY AND BODY MASS INDEX (BMI) OF 31.0 TO 31.9 IN ADULT: Status: ACTIVE | Noted: 2022-06-03

## 2022-06-03 PROBLEM — E66.09 CLASS 1 OBESITY DUE TO EXCESS CALORIES WITH SERIOUS COMORBIDITY AND BODY MASS INDEX (BMI) OF 31.0 TO 31.9 IN ADULT: Status: ACTIVE | Noted: 2022-06-03

## 2022-06-03 NOTE — TELEPHONE ENCOUNTER
Huma received secure chat regarding BOOST assistance from nurse, Donita Hauser RN. Swer reviewed pt. chart and noticed  department sent in referral to cancer services in 2021. Swer called Cancer Services to inquire about pt. eligibility. Cancer Services reported pt. used 12 month eligibility in 2017, was referred again in 2021, however pt. did not use BOOST services in 2021. Cancer Services reported they could assist with renewing services starting this month for another 6 month eligibility for BOOST assistance. Swer called pt. to inform about the 6 month renewal eligibility. Pt. expressed interest and gratitude and gave verbal consent to send in a new referral to cancer services for BOOST assistance. Swer will send in referral to Cancer Services pending provider signature.  Pt. inquired about wig assistance through [employer]. Swer reviewed pt. documents and noticed pt. received wig voucher in February of this year. Pt. confirmed she placed an order with [employer], however she never received her order. Swer called [employer] to inquire about pt. order, however [employer] requires pt. confirmation. Huma called pt. to update on wig assistance. Huma informed pt. that she would have to call [employer] ([phone]) to confirm order. Ryanr provided pt. with Wig Gift Certificate Number that was provided in February in the event [employer] may need ([id number]). Pt. has  contact information in the event any needs may arise. Swer will remain available.

## 2022-06-03 NOTE — ASSESSMENT & PLAN NOTE
Last documentation =       BMI noted to be elevated. Changes in weight over time reviewed in chart (if available) and by patient recollection. Patient advised and counseled concerning the adverse medical consequences of obesity. Treatment options discussed - calorie restriction, increasing calorie expenditure, medical and surgical options noted.  The patient's severe obesity was monitored, evaluated, addressed and/or treated.   2013 AHA/ACC/TOS guideline for the management of overweight and obesity in adults: a report of the American College of Cardiology/American Heart Association Task Force on Practice Guidelines and The Obesity Society

## 2022-06-06 ENCOUNTER — DOCUMENTATION ONLY (OUTPATIENT)
Dept: HEMATOLOGY/ONCOLOGY | Facility: CLINIC | Age: 52
End: 2022-06-06
Payer: MEDICAID

## 2022-06-06 DIAGNOSIS — C73 PAPILLARY THYROID CARCINOMA: ICD-10-CM

## 2022-06-06 DIAGNOSIS — R94.6 THYROID FUNCTION STUDY ABNORMALITY: ICD-10-CM

## 2022-06-06 NOTE — PROGRESS NOTES
Swer faxed Cancer Services referral for BOOST renewal assistance on today's date. Swer will remain available.

## 2022-06-07 ENCOUNTER — PATIENT MESSAGE (OUTPATIENT)
Dept: PHARMACY | Facility: CLINIC | Age: 52
End: 2022-06-07
Payer: MEDICAID

## 2022-06-08 RX ORDER — LEVOTHYROXINE SODIUM 200 UG/1
175 TABLET ORAL DAILY
Qty: 30 TABLET | Refills: 2 | Status: SHIPPED | OUTPATIENT
Start: 2022-06-08 | End: 2022-07-18 | Stop reason: SDUPTHER

## 2022-06-13 ENCOUNTER — SPECIALTY PHARMACY (OUTPATIENT)
Dept: PHARMACY | Facility: CLINIC | Age: 52
End: 2022-06-13
Payer: MEDICAID

## 2022-06-13 DIAGNOSIS — Z17.0 MALIGNANT NEOPLASM OF OVERLAPPING SITES OF RIGHT BREAST IN FEMALE, ESTROGEN RECEPTOR POSITIVE: Primary | ICD-10-CM

## 2022-06-13 DIAGNOSIS — C50.811 MALIGNANT NEOPLASM OF OVERLAPPING SITES OF RIGHT BREAST IN FEMALE, ESTROGEN RECEPTOR POSITIVE: Primary | ICD-10-CM

## 2022-06-13 NOTE — TELEPHONE ENCOUNTER
Specialty Pharmacy - Refill Coordination    Specialty Medication Orders Linked to Encounter    Flowsheet Row Most Recent Value   Medication #1 palbociclib (IBRANCE) 125 mg Cap (Order#[id number], Rx#[id number])   Medication #2 letrozole (FEMARA) 2.5 mg Tab (Order#[id number], Rx#[id number])        Refill Questions - Documented Responses    Flowsheet Row Most Recent Value   Patient Availability and HIPAA Verification    Does patient want to proceed with activity? Unable to Reach        We have had multiple attempts to the patient and have been unsuccessful to reach the patient. We will stop reaching out to the patient but in the event that the patient needs the med and contacts us, we will communicate and begin dispensing for the patient. At your next visit with the patient, please review the importance of being in contact with our specialty pharmacy as a part of our care team.      Kyara Jason, PharmD  [facility]  [address] [place] [id number]  Phone: [phone]  Fax: [phone]

## 2022-06-16 ENCOUNTER — OFFICE VISIT (OUTPATIENT)
Dept: PALLIATIVE MEDICINE | Facility: CLINIC | Age: 52
End: 2022-06-16
Payer: MEDICAID

## 2022-06-16 VITALS
DIASTOLIC BLOOD PRESSURE: 55 MMHG | BODY MASS INDEX: 31.83 KG/M2 | SYSTOLIC BLOOD PRESSURE: 97 MMHG | TEMPERATURE: 98 F | HEIGHT: 67 IN | WEIGHT: 202.81 LBS | HEART RATE: 103 BPM | RESPIRATION RATE: 18 BRPM

## 2022-06-16 DIAGNOSIS — C79.51 SECONDARY CANCER OF BONE: ICD-10-CM

## 2022-06-16 DIAGNOSIS — Z51.5 ENCOUNTER FOR PALLIATIVE CARE: Primary | ICD-10-CM

## 2022-06-16 DIAGNOSIS — G89.3 NEOPLASM RELATED PAIN: ICD-10-CM

## 2022-06-16 DIAGNOSIS — F41.9 ANXIETY: ICD-10-CM

## 2022-06-16 DIAGNOSIS — C50.811 MALIGNANT NEOPLASM OF OVERLAPPING SITES OF RIGHT BREAST IN FEMALE, ESTROGEN RECEPTOR POSITIVE: ICD-10-CM

## 2022-06-16 DIAGNOSIS — Z17.0 MALIGNANT NEOPLASM OF OVERLAPPING SITES OF RIGHT BREAST IN FEMALE, ESTROGEN RECEPTOR POSITIVE: ICD-10-CM

## 2022-06-16 DIAGNOSIS — C53.0 MALIGNANT NEOPLASM OF ENDOCERVIX: ICD-10-CM

## 2022-06-16 PROCEDURE — 1159F PR MEDICATION LIST DOCUMENTED IN MEDICAL RECORD: ICD-10-PCS | Mod: CPTII,,, | Performed by: NURSE PRACTITIONER

## 2022-06-16 PROCEDURE — 3008F BODY MASS INDEX DOCD: CPT | Mod: CPTII,,, | Performed by: NURSE PRACTITIONER

## 2022-06-16 PROCEDURE — 3078F DIAST BP <80 MM HG: CPT | Mod: CPTII,,, | Performed by: NURSE PRACTITIONER

## 2022-06-16 PROCEDURE — 3074F SYST BP LT 130 MM HG: CPT | Mod: CPTII,,, | Performed by: NURSE PRACTITIONER

## 2022-06-16 PROCEDURE — 1159F MED LIST DOCD IN RCRD: CPT | Mod: CPTII,,, | Performed by: NURSE PRACTITIONER

## 2022-06-16 PROCEDURE — 99214 PR OFFICE/OUTPT VISIT, EST, LEVL IV, 30-39 MIN: ICD-10-PCS | Mod: S$PBB,,, | Performed by: NURSE PRACTITIONER

## 2022-06-16 PROCEDURE — 3074F PR MOST RECENT SYSTOLIC BLOOD PRESSURE < 130 MM HG: ICD-10-PCS | Mod: CPTII,,, | Performed by: NURSE PRACTITIONER

## 2022-06-16 PROCEDURE — 99999 PR PBB SHADOW E&M-EST. PATIENT-LVL V: CPT | Mod: PBBFAC,,, | Performed by: NURSE PRACTITIONER

## 2022-06-16 PROCEDURE — 99215 OFFICE O/P EST HI 40 MIN: CPT | Mod: PBBFAC | Performed by: NURSE PRACTITIONER

## 2022-06-16 PROCEDURE — 99214 OFFICE O/P EST MOD 30 MIN: CPT | Mod: S$PBB,,, | Performed by: NURSE PRACTITIONER

## 2022-06-16 PROCEDURE — 99999 PR PBB SHADOW E&M-EST. PATIENT-LVL V: ICD-10-PCS | Mod: PBBFAC,,, | Performed by: NURSE PRACTITIONER

## 2022-06-16 PROCEDURE — 3008F PR BODY MASS INDEX (BMI) DOCUMENTED: ICD-10-PCS | Mod: CPTII,,, | Performed by: NURSE PRACTITIONER

## 2022-06-16 PROCEDURE — 3078F PR MOST RECENT DIASTOLIC BLOOD PRESSURE < 80 MM HG: ICD-10-PCS | Mod: CPTII,,, | Performed by: NURSE PRACTITIONER

## 2022-06-16 NOTE — PROGRESS NOTES
Progress Note  Palliative Care      Consult Requested By: Dr. Asencio, oncology  Reason for Consult: pain and symptom management,   Understanding Illness, Treatment Decisions, Coping with Life-Limiting Illness and Advance Care Planning      ASSESSMENT/PLAN:     Plan/Recommendations:  Diagnoses and all orders for this visit:    Encounter for palliative care   -patient is decisional   -patient is accompanied by her partner of 16 years, Alsesio Deluna   -ACP documents reviewed   -HCPOA:  David Acevedo, sister, at [phone]   -philosophy of palliative medicine reviewed with patient and family at previous visit   -pt is DNR; LaPOST completed and uploaded in EMR     Neoplasm related pain   -controlled on current regimen   -continue Norco 10/325mg prn pain (takes 4 sometimes 5 per day)   -continue MS Contin 30mg PO BID (increased last visit)   -bowel regimen to prevent OIC   -some post-prandial abdominal pain and after BM-referred to GI, saw Dr. Vázquez yesterday     Metastatic squamous cell carcinoma of cervix   -followed by Dr. Asencio; seen by Dr. Nelson on 06/02/2022   -followed by gyn onc, Dr. Desouza    -followed by rad onc, Dr. Vázquez at [facility] (last visit 0/2022)   -previously on palliative chemotherapy: cisplatin, paclitaxel and bevacizumab   -now on maintenance bevacizumab    -tolerating current treatments   -recent imaging stable    Metastatic breast cancer with bone mets   -followed by Dr. Asencio   -continues on Femara         Understanding of illness/Prognosis: good insight into multiple malignancies    Goals of care:  Life-prolonging, okay with hospice if treatment options become limited    Follow up: 3 months or prn      SUBJECTIVE:     History of Present Illness:  Patient is a 51 y.o. year old female presenting with for palliative follow up for her metastatic breast cancer, SCC of the cervix. Treatments currently on hold due to recurrent ear infection. She is anxious about results of her recent scan and fearful of  "disease progression while off treatment.  Otherwise, her pain continues to be controlled on her current regimen.  No new symptoms or complaints today.    ONCOLOGY DIAGNOSES:  Stage IV cervical squamous cell carcinoma   stage IV, T2 N1b M1, invasive breast carcinoma, ER/OK+, HER2-   Papillary thyroid carcinoma status post total thyroidectomy on 08/31/2017, mpT1b N0    Cervical HSIL MIREILLE 3 s/p LEEP, 2017    01/06/2022  Patient looks and feels better compared to last visit.  She walked into her appt.  Ear is better and she is being followed by ENT.  She has resumed treatment for both her cervical and breast cancer and is followed closely by medical oncology and gyn oncology.  Pain continues to be controlled on current medication regimen.  She is having knee issues with swelling and likely OA.  We discussed referral to orthopedics.  She also plans to follow up with neurosurgery.  Overall, no major changes and no new issues.     03/10/2022:  Overall, patient seems to be doing okay.  She has some new GI issues with abdominal pain after eating and intermittent diarrhea not felt to be related to her disease or treatment.  Oncology discussed GI referral and she is considering this if no improvement.  We are adjusting her pain medication regimen today mostly to cut back on her prn Norco which she continues to use consistently at 5-6 tablets/day.  She knows she can reach out for additional questions or concerns.      Interval History  06/16/2022:    Patient doing okay.  No new issues since her last visit.  She continue to have some lower GI pains describes now as "cramping after a BM".  Previously, her discomfort was after eating.  She has been referred to GI.  She has some fatigue but managing.  Pain is controlled on current regimen.  She has upcoming scans and appointment with Dr. Asencio.  She has some anxiety and life stressors affecting her situation.  Her son and his ex-girfriend (now in [place]) can no longer care for their " 4 year old daughter.  She and her partner are assuming the parental rights of her granddaughter.  They seem to be coping as well as can be expected.  Overall, she is doing okay.  We will continue to follow at quarterly intervals.  She knows to reach out if her situation changes or symptom burden worsens.      JEANETTE GANNON reviewed and summarized:           Past Medical History:   Diagnosis Date    Anxiety     Asthma     Breast cancer 2017    Cancer     right breast, metastatic-lymph nodes, bone, and thyroid     COPD (chronic obstructive pulmonary disease)     Depression     History of psychiatric hospitalization     2000; suicidal ideation    Hx of psychiatric care     Hypothyroidism     Miscarriage     five miscarriages    Obesity     Psychiatric problem     Thyroid cancer 2017     Past Surgical History:   Procedure Laterality Date    ABSCESS DRAINAGE      bilateral axilla    ADENOIDECTOMY      APPENDECTOMY      BREAST BIOPSY Right     x3    CHOLECYSTECTOMY      ENDOBRONCHIAL ULTRASOUND Bilateral 2/18/2021    Procedure: ENDOBRONCHIAL ULTRASOUND (EBUS);  Surgeon: Jaron Ni MD;  Location: [facility] ENDO;  Service: Pulmonary;  Laterality: Bilateral;    FLUOROSCOPY N/A 1/28/2021    Procedure: Mediport placement;  Surgeon: Noah Phelan MD;  Location: [facility] CATH LAB;  Service: General;  Laterality: N/A;    MASS EXCISION      MEDIPORT INSERTION, SINGLE Right 02/2021    SKIN GRAFT  06/16/2017    THYROIDECTOMY Bilateral     TONSILLECTOMY       Family History   Problem Relation Age of Onset    Arthritis Mother     Early death Mother         64 at time of death    Heart attack Mother     Heart disease Mother     Heart failure Mother     Hypertension Mother     Coronary artery disease Father     Hearing loss Father     Heart disease Father     Hyperlipidemia Father     Hypertension Father     Mental illness Father     Learning disabilities Son     Cancer Maternal Aunt      Review of  patient's allergies indicates:   Allergen Reactions    Gabapentin Swelling     Note: unilateral joint edema, unclear if due to gabapentin    Nsaids (non-steroidal anti-inflammatory drug) Other (See Comments)     Instructed not to take NSAID drugs with chemo pill.    Clindamycin Rash    Vancomycin analogues Itching       Medications:    Current Outpatient Medications:     albuterol (PROVENTIL) 2.5 mg /3 mL (0.083 %) nebulizer solution, Take 3 mLs (2.5 mg total) by nebulization every 4 to 6 hours as needed for Wheezing or Shortness of Breath., Disp: 360 mL, Rfl: 11    albuterol (PROVENTIL/VENTOLIN HFA) 90 mcg/actuation inhaler, Inhale 2 puffs into the lungs every 4 (four) hours as needed for Wheezing or Shortness of Breath., Disp: 18 g, Rfl: 11    ALPRAZolam (XANAX) 1 MG tablet, Take 0.5-1 tablets (0.5-1 mg total) by mouth 2 (two) times daily as needed for Anxiety., Disp: 60 tablet, Rfl: 0    amoxicillin (AMOXIL) 875 MG tablet, Take 875 mg by mouth 2 (two) times daily., Disp: , Rfl:     buPROPion (WELLBUTRIN SR) 150 MG TBSR 12 hr tablet, Take 1 tablet (150 mg total) by mouth 2 (two) times daily. Take 1 tablet (150mg) once daily for 1 week then increase to twice daily, Disp: 60 tablet, Rfl: 0    calcium carbonate 400 mg calcium (1,000 mg) Chew, Take 2,000 mg by mouth once daily., Disp: , Rfl:     cyanocobalamin (VITAMIN B-12) 1000 MCG tablet, Take 1 tablet (1,000 mcg total) by mouth once daily., Disp: 90 tablet, Rfl: 3    dexAMETHasone (DECADRON) 4 MG Tab, Take 2 tablets (8 mg total) by mouth once daily. Take as directed on days 2, 3, and 4 of your chemotherapy cycle., Disp: 24 tablet, Rfl: 0    diphenoxylate-atropine 2.5-0.025 mg (LOMOTIL) 2.5-0.025 mg per tablet, Take 1 tablet by mouth 4 (four) times daily as needed for Diarrhea., Disp: 30 tablet, Rfl: 1    ergocalciferol (VITAMIN D2) 50,000 unit Cap, Take 5,000 Units by mouth once daily at 6am. , Disp: , Rfl:     HYDROcodone-acetaminophen (NORCO)   mg per tablet, Take 1 tablet by mouth every 4 (four) hours as needed for Pain. No more than 4 doses/ day, Disp: 180 tablet, Rfl: 0    hydrOXYzine HCL (ATARAX) 25 MG tablet, Take 1 tablet (25 mg total) by mouth 3 (three) times daily as needed for Itching., Disp: 90 tablet, Rfl: 1    ipratropium (ATROVENT) 42 mcg (0.06 %) nasal spray, 2 sprays by Nasal route 4 (four) times daily. As needed for rhinitis. Take in evening before bed and in the morning, Disp: 15 mL, Rfl: 11    letrozole (FEMARA) 2.5 mg Tab, Take 1 tablet (2.5 mg) by mouth once daily., Disp: 90 tablet, Rfl: 3    levothyroxine (EUTHYROX) 200 MCG tablet, Take 1 tablet (200 mcg total) by mouth once daily., Disp: 30 tablet, Rfl: 2    levothyroxine (SYNTHROID) 75 MCG tablet, Take 1 tablet (75 mcg total) by mouth once daily., Disp: 30 tablet, Rfl: 11    morphine (MS CONTIN) 30 MG 12 hr tablet, Take 1 tablet (30 mg total) by mouth every 12 (twelve) hours., Disp: 60 tablet, Rfl: 0    multivitamin (THERAGRAN) per tablet, Take 1 tablet by mouth once daily., Disp: , Rfl:     nitrofurantoin, macrocrystal-monohydrate, (MACROBID) 100 MG capsule, Take 100 mg by mouth 2 (two) times daily., Disp: , Rfl:     OLANZapine (ZYPREXA) 5 MG tablet, Take 1 tablet (5 mg total) by mouth every evening. Take as directed on days 1-4 of your chemotherapy cycle. May increase to 10 mg if breakthrough nausea occurs., Disp: 30 tablet, Rfl: 2    ondansetron (ZOFRAN-ODT) 8 MG TbDL, Take 1 tablet (8 mg total) by mouth every 8 (eight) hours as needed (nausea/vomiting)., Disp: 60 tablet, Rfl: 5    palbociclib (IBRANCE) 125 mg Cap, Take one capsule (125 mg) by mouth once daily on days 1-21 of each 28-day cycle., Disp: 21 capsule, Rfl: 11    phenazopyridine (PYRIDIUM) 200 MG tablet, Take 200 mg by mouth 3 (three) times daily., Disp: , Rfl:     potassium chloride SA (K-DUR,KLOR-CON) 20 MEQ tablet, Take 1 tablet (20 mEq total) by mouth once daily. Take daily for 3 days., Disp: 3  tablet, Rfl: 1    OBJECTIVE:       ROS:  Review of Systems   Constitutional: Positive for activity change and fatigue.   HENT: Negative.    Eyes: Negative.    Respiratory: Negative for shortness of breath.    Gastrointestinal: Positive for diarrhea (sometimes). Negative for abdominal pain, blood in stool, constipation, nausea, rectal pain and vomiting.   Endocrine: Negative.    Genitourinary: Positive for pelvic pain. Negative for difficulty urinating.   Musculoskeletal: Positive for arthralgias, back pain, gait problem and joint swelling.   Skin: Negative.    Allergic/Immunologic: Positive for immunocompromised state.   Neurological: Positive for weakness. Negative for dizziness and headaches.   Psychiatric/Behavioral: Positive for sleep disturbance. Negative for confusion and decreased concentration. The patient is nervous/anxious.        Review of Symptoms    Symptom Assessment (ESAS 0-10 Scale)  Pain:  6  Dyspnea:  0  Anxiety:  8  Nausea:  0  Depression:  7  Anorexia:  0  Fatigue:  7  Insomnia:  8  Restlessness:  8  Agitation:  9     CAM / Delirium:  Negative  Constipation:  Negative  Diarrhea:  Negative    Bowel Management Plan (BMP):  Yes      Location Choices: back and knees.      ECOG Performance Status rdGrdrrdarddrderd:rd rd3rd Living Arrangements:  Lives with spouse and Lives in home    Psychosocial/Cultural: Lives with her significant other of 16 years: they have 3 children combined, 2 sons from previous marriage. Soon will become legal guardian of 5 yo granddaughter due to parent's with substance abuse issues      Advance Care Planning   Advance Directives:   Living Will: Yes        Copy on chart: Yes    LaPOST: Yes    Do Not Resuscitate Status: Yes      Decision Making:  Patient answered questions        Physical Exam:  Vitals: Temp: 98 °F (36.7 °C) (06/16/22 1111)  Pulse: 103 (06/16/22 1111)  Resp: 18 (06/16/22 1111)  BP: (!) 97/55 (06/16/22 1111)  Physical Exam  Constitutional:       General: She is not in acute  distress.     Appearance: Normal appearance. She is ill-appearing (chronically ).      Comments: Seating in wheelchair   HENT:      Head: Normocephalic and atraumatic.      Comments: alopecia     Nose: No congestion.      Mouth/Throat:      Mouth: Mucous membranes are moist.      Pharynx: Oropharynx is clear.   Eyes:      Extraocular Movements: Extraocular movements intact.      Pupils: Pupils are equal, round, and reactive to light.   Cardiovascular:      Rate and Rhythm: Normal rate.   Pulmonary:      Effort: Pulmonary effort is normal.   Abdominal:      Palpations: Abdomen is soft.      Tenderness: There is abdominal tenderness.   Musculoskeletal:         General: Normal range of motion.      Cervical back: Neck supple.   Skin:     General: Skin is warm and dry.   Neurological:      Mental Status: She is alert and oriented to person, place, and time.      Motor: Weakness present.      Gait: Gait abnormal.   Psychiatric:         Mood and Affect: Mood normal.         Behavior: Behavior normal.         Thought Content: Thought content normal.         Judgment: Judgment normal.         Labs:  CBC:   WBC   Date Value Ref Range Status   06/02/2022 6.22 3.90 - 12.70 K/uL Final     Hemoglobin   Date Value Ref Range Status   06/02/2022 10.7 (L) 12.0 - 16.0 g/dL Final     Hematocrit   Date Value Ref Range Status   06/02/2022 32.8 (L) 37.0 - 48.5 % Final     MCV   Date Value Ref Range Status   06/02/2022 103 (H) 82 - 98 fL Final     Platelets   Date Value Ref Range Status   06/02/2022 220 150 - 450 K/uL Final       LFT:   Lab Results   Component Value Date    AST 13 06/02/2022    ALKPHOS 85 06/02/2022    BILITOT 0.3 06/02/2022       Albumin:   Albumin   Date Value Ref Range Status   06/02/2022 3.8 3.5 - 5.2 g/dL Final     Protein:   Total Protein   Date Value Ref Range Status   06/02/2022 7.0 6.0 - 8.4 g/dL Final       Radiology: Chest xray 04/18/2022  Impression:   No acute cardiopulmonary process.      30 minutes of total  time spent on the encounter, which includes face to face time and non-face to face time preparing to see the patient (eg, review of tests), Obtaining and/or reviewing separately obtained history, Documenting clinical information in the electronic or other health record, Independently interpreting results (not separately reported) and communicating results to the patient/family/caregiver, or Care coordination (not separately reported).    Signature: Telma Kumar NP

## 2022-06-22 ENCOUNTER — TELEPHONE (OUTPATIENT)
Dept: INFUSION THERAPY | Facility: HOSPITAL | Age: 52
End: 2022-06-22
Payer: MEDICAID

## 2022-06-22 NOTE — TELEPHONE ENCOUNTER
----- Message from Jagdeep Butterfield LPN sent at 6/22/2022 12:05 PM CDT -----  Contact: Josey  Please help erica pt get r/s. thanks  ----- Message -----  From: Radha Wang  Sent: 6/22/2022  11:06 AM CDT  To: Elif COOK Staff    Josey would like a call back at [phone] in regards to rescheduling her appointment due to transportation trouble, she would like something some time next week if possible.  thanks

## 2022-06-23 ENCOUNTER — TELEPHONE (OUTPATIENT)
Dept: HEMATOLOGY/ONCOLOGY | Facility: CLINIC | Age: 52
End: 2022-06-23
Payer: MEDICAID

## 2022-06-23 NOTE — TELEPHONE ENCOUNTER
Nurse called [phone]    regarding setting up a peer to peer for pt [employer]. Nurse left all requested information on vm for peer to peer set up.

## 2022-06-24 ENCOUNTER — TELEPHONE (OUTPATIENT)
Dept: INFUSION THERAPY | Facility: HOSPITAL | Age: 52
End: 2022-06-24
Payer: MEDICAID

## 2022-06-24 NOTE — TELEPHONE ENCOUNTER
Shelia Mason [facility]  [phone]     Shelia calling to do peer to peer review with Dr. Asencio. Dr. Asencio should call this direct number back.

## 2022-06-30 ENCOUNTER — OFFICE VISIT (OUTPATIENT)
Dept: HEMATOLOGY/ONCOLOGY | Facility: CLINIC | Age: 52
End: 2022-06-30
Payer: MEDICAID

## 2022-06-30 ENCOUNTER — INFUSION (OUTPATIENT)
Dept: INFUSION THERAPY | Facility: HOSPITAL | Age: 52
End: 2022-06-30
Attending: INTERNAL MEDICINE
Payer: MEDICAID

## 2022-06-30 VITALS
OXYGEN SATURATION: 99 % | HEART RATE: 74 BPM | DIASTOLIC BLOOD PRESSURE: 71 MMHG | TEMPERATURE: 97 F | BODY MASS INDEX: 31.56 KG/M2 | HEIGHT: 67 IN | SYSTOLIC BLOOD PRESSURE: 110 MMHG | RESPIRATION RATE: 18 BRPM | WEIGHT: 201.06 LBS

## 2022-06-30 VITALS
DIASTOLIC BLOOD PRESSURE: 80 MMHG | HEART RATE: 105 BPM | SYSTOLIC BLOOD PRESSURE: 116 MMHG | HEIGHT: 67 IN | BODY MASS INDEX: 31.56 KG/M2 | OXYGEN SATURATION: 99 % | TEMPERATURE: 97 F | WEIGHT: 201.06 LBS

## 2022-06-30 DIAGNOSIS — C73 PAPILLARY THYROID CARCINOMA: ICD-10-CM

## 2022-06-30 DIAGNOSIS — Z17.0 MALIGNANT NEOPLASM OF OVERLAPPING SITES OF RIGHT BREAST IN FEMALE, ESTROGEN RECEPTOR POSITIVE: ICD-10-CM

## 2022-06-30 DIAGNOSIS — C77.3 MALIGNANT NEOPLASM METASTATIC TO LYMPH NODE OF AXILLA: Primary | ICD-10-CM

## 2022-06-30 DIAGNOSIS — E03.9 HYPOTHYROIDISM, UNSPECIFIED TYPE: ICD-10-CM

## 2022-06-30 DIAGNOSIS — C79.51 SECONDARY CANCER OF BONE: ICD-10-CM

## 2022-06-30 DIAGNOSIS — C53.0 MALIGNANT NEOPLASM OF ENDOCERVIX: Primary | ICD-10-CM

## 2022-06-30 DIAGNOSIS — C53.9 RECURRENT CERVICAL CANCER: ICD-10-CM

## 2022-06-30 DIAGNOSIS — C50.811 MALIGNANT NEOPLASM OF OVERLAPPING SITES OF RIGHT BREAST IN FEMALE, ESTROGEN RECEPTOR POSITIVE: ICD-10-CM

## 2022-06-30 PROCEDURE — 3079F PR MOST RECENT DIASTOLIC BLOOD PRESSURE 80-89 MM HG: ICD-10-PCS | Mod: CPTII,,, | Performed by: INTERNAL MEDICINE

## 2022-06-30 PROCEDURE — 3008F PR BODY MASS INDEX (BMI) DOCUMENTED: ICD-10-PCS | Mod: CPTII,,, | Performed by: INTERNAL MEDICINE

## 2022-06-30 PROCEDURE — 99215 PR OFFICE/OUTPT VISIT, EST, LEVL V, 40-54 MIN: ICD-10-PCS | Mod: S$PBB,,, | Performed by: INTERNAL MEDICINE

## 2022-06-30 PROCEDURE — 96413 CHEMO IV INFUSION 1 HR: CPT

## 2022-06-30 PROCEDURE — 1159F PR MEDICATION LIST DOCUMENTED IN MEDICAL RECORD: ICD-10-PCS | Mod: CPTII,,, | Performed by: INTERNAL MEDICINE

## 2022-06-30 PROCEDURE — 99215 OFFICE O/P EST HI 40 MIN: CPT | Mod: PBBFAC,25 | Performed by: INTERNAL MEDICINE

## 2022-06-30 PROCEDURE — 99215 OFFICE O/P EST HI 40 MIN: CPT | Mod: S$PBB,,, | Performed by: INTERNAL MEDICINE

## 2022-06-30 PROCEDURE — 1159F MED LIST DOCD IN RCRD: CPT | Mod: CPTII,,, | Performed by: INTERNAL MEDICINE

## 2022-06-30 PROCEDURE — 1160F PR REVIEW ALL MEDS BY PRESCRIBER/CLIN PHARMACIST DOCUMENTED: ICD-10-PCS | Mod: CPTII,,, | Performed by: INTERNAL MEDICINE

## 2022-06-30 PROCEDURE — 3008F BODY MASS INDEX DOCD: CPT | Mod: CPTII,,, | Performed by: INTERNAL MEDICINE

## 2022-06-30 PROCEDURE — 99999 PR PBB SHADOW E&M-EST. PATIENT-LVL V: CPT | Mod: PBBFAC,,, | Performed by: INTERNAL MEDICINE

## 2022-06-30 PROCEDURE — 63600175 PHARM REV CODE 636 W HCPCS: Performed by: INTERNAL MEDICINE

## 2022-06-30 PROCEDURE — 25000003 PHARM REV CODE 250: Performed by: INTERNAL MEDICINE

## 2022-06-30 PROCEDURE — 99999 PR PBB SHADOW E&M-EST. PATIENT-LVL V: ICD-10-PCS | Mod: PBBFAC,,, | Performed by: INTERNAL MEDICINE

## 2022-06-30 PROCEDURE — 3079F DIAST BP 80-89 MM HG: CPT | Mod: CPTII,,, | Performed by: INTERNAL MEDICINE

## 2022-06-30 PROCEDURE — 1160F RVW MEDS BY RX/DR IN RCRD: CPT | Mod: CPTII,,, | Performed by: INTERNAL MEDICINE

## 2022-06-30 PROCEDURE — 3074F PR MOST RECENT SYSTOLIC BLOOD PRESSURE < 130 MM HG: ICD-10-PCS | Mod: CPTII,,, | Performed by: INTERNAL MEDICINE

## 2022-06-30 PROCEDURE — 3074F SYST BP LT 130 MM HG: CPT | Mod: CPTII,,, | Performed by: INTERNAL MEDICINE

## 2022-06-30 RX ORDER — HEPARIN 100 UNIT/ML
500 SYRINGE INTRAVENOUS
Status: DISCONTINUED | OUTPATIENT
Start: 2022-06-30 | End: 2022-06-30 | Stop reason: HOSPADM

## 2022-06-30 RX ORDER — DIPHENHYDRAMINE HYDROCHLORIDE 50 MG/ML
50 INJECTION INTRAMUSCULAR; INTRAVENOUS ONCE AS NEEDED
Status: CANCELLED | OUTPATIENT
Start: 2022-06-30

## 2022-06-30 RX ORDER — EPINEPHRINE 0.3 MG/.3ML
0.3 INJECTION SUBCUTANEOUS ONCE AS NEEDED
Status: CANCELLED | OUTPATIENT
Start: 2022-06-30

## 2022-06-30 RX ORDER — SODIUM CHLORIDE 0.9 % (FLUSH) 0.9 %
10 SYRINGE (ML) INJECTION
Status: CANCELLED | OUTPATIENT
Start: 2022-06-30

## 2022-06-30 RX ORDER — HEPARIN 100 UNIT/ML
500 SYRINGE INTRAVENOUS
Status: CANCELLED | OUTPATIENT
Start: 2022-06-30

## 2022-06-30 RX ORDER — SODIUM CHLORIDE 0.9 % (FLUSH) 0.9 %
10 SYRINGE (ML) INJECTION
Status: DISCONTINUED | OUTPATIENT
Start: 2022-06-30 | End: 2022-06-30 | Stop reason: HOSPADM

## 2022-06-30 RX ADMIN — HEPARIN 500 UNITS: 100 SYRINGE at 11:06

## 2022-06-30 RX ADMIN — SODIUM CHLORIDE 400 MG: 9 INJECTION, SOLUTION INTRAVENOUS at 11:06

## 2022-06-30 NOTE — PROGRESS NOTES
Subjective:      DATE OF VISIT: 6/30/2022   ?   Patient ID:?Josey Flores is a 51 y.o. female.?? MR#: [id number]   ?   PRIMARY PROVIDER: Dr. Asencio  ?   CHIEF COMPLAINT:  Follow-up  ?   ONCOLOGIC DIAGNOSIS:      Stage IV cervical squamous cell carcinoma    stage IV, T2 N1b M1, invasive breast carcinoma, ER/GA+, HER2-    Papillary thyroid carcinoma status post total thyroidectomy on 08/31/2017, mpT1b N0     Cervical HSIL MIREILLE 3 s/p LEEP, 2017  ?   CURRENT TREATMENT:    Letrozole  Carboplatin, paclitaxel and pembrolizumab, C1D1 1/28/22; maintenance pembrolizumab started 6/20/22    PAST TREATMENT:    Palliative chemotherapy: cisplatin, paclitaxel and bevacizumab; 02/22/2021 cycle 1 day 1 -> bevacizumab maintenance after 6 cycles  Palbociclib and letrozole (palbociclib held with ongoing chemotherapy due to concern for cytopenias)    INTERVAL EVENTS    She has been doing well since our last visit.  End of May 2022 completed radiation therapy with Dr. Vázquez at [facility] admitted.  She does note since radiation treatment pain following defecation.  No tenesmus change in stool otherwise, hematochezia or melena .  No fevers or chills.  She is otherwise feeling well and has now completed 6 cycles of chemotherapy (note 1 cycle without immunotherapy when she was following with my colleague).  She continues on stable dose levothyroxine 250 mcg daily.    ROS  A comprehensive 14-point review of systems was reviewed with patient and was negative other than as specified above.   ?     Objective:      Physical Exam        Vitals:    06/30/22 0947   BP: 116/80   Pulse: 105   Temp: 97.1 °F (36.2 °C)      General appearance: Generally well appearing, in no acute distress.   Head, eyes, ears, nose, and throat: Oropharynx clear with moist mucous membranes.   Cardiovascular: Regular rate and rhythm, S1, S2, no audible murmurs.   Respiratory: Lungs clear to auscultation bilaterally.   Abdomen: nontender,  nondistended.   Extremities: Warm, without edema.   Neurologic: Alert and oriented.  Skin: No rashes, ecchymoses or petechial lesion.   Psychiatric: normal mood and affect, conversant and appropriate    Laboratory:  ?   Lab Visit on 06/30/2022   Component Date Value Ref Range Status    WBC 06/30/2022 6.37  3.90 - 12.70 K/uL Final    RBC 06/30/2022 3.00 (A) 4.00 - 5.40 M/uL Final    Hemoglobin 06/30/2022 10.0 (A) 12.0 - 16.0 g/dL Final    Hematocrit 06/30/2022 31.9 (A) 37.0 - 48.5 % Final    MCV 06/30/2022 106 (A) 82 - 98 fL Final    MCH 06/30/2022 33.3 (A) 27.0 - 31.0 pg Final    MCHC 06/30/2022 31.3 (A) 32.0 - 36.0 g/dL Final    RDW 06/30/2022 19.3 (A) 11.5 - 14.5 % Final    Platelets 06/30/2022 247  150 - 450 K/uL Final    MPV 06/30/2022 10.6  9.2 - 12.9 fL Final    Immature Granulocytes 06/30/2022 2.2 (A) 0.0 - 0.5 % Final    Gran # (ANC) 06/30/2022 3.4  1.8 - 7.7 K/uL Final    Immature Grans (Abs) 06/30/2022 0.14 (A) 0.00 - 0.04 K/uL Final    Lymph # 06/30/2022 1.6  1.0 - 4.8 K/uL Final    Mono # 06/30/2022 1.0  0.3 - 1.0 K/uL Final    Eos # 06/30/2022 0.3  0.0 - 0.5 K/uL Final    Baso # 06/30/2022 0.03  0.00 - 0.20 K/uL Final    nRBC 06/30/2022 0  0 /100 WBC Final    Gran % 06/30/2022 53.5  38.0 - 73.0 % Final    Lymph % 06/30/2022 24.5  18.0 - 48.0 % Final    Mono % 06/30/2022 14.9  4.0 - 15.0 % Final    Eosinophil % 06/30/2022 4.4  0.0 - 8.0 % Final    Basophil % 06/30/2022 0.5  0.0 - 1.9 % Final    Differential Method 06/30/2022 Automated   Final    Sodium 06/30/2022 141  136 - 145 mmol/L Final    Potassium 06/30/2022 3.7  3.5 - 5.1 mmol/L Final    Chloride 06/30/2022 103  95 - 110 mmol/L Final    CO2 06/30/2022 25  23 - 29 mmol/L Final    Glucose 06/30/2022 114 (A) 70 - 110 mg/dL Final    BUN 06/30/2022 12  6 - 20 mg/dL Final    Creatinine 06/30/2022 1.2  0.5 - 1.4 mg/dL Final    Calcium 06/30/2022 9.6  8.7 - 10.5 mg/dL Final    Total Protein 06/30/2022 6.8  6.0 - 8.4 g/dL  Final    Albumin 06/30/2022 3.7  3.5 - 5.2 g/dL Final    Total Bilirubin 06/30/2022 0.4  0.1 - 1.0 mg/dL Final    Alkaline Phosphatase 06/30/2022 80  55 - 135 U/L Final    AST 06/30/2022 14  10 - 40 U/L Final    ALT 06/30/2022 10  10 - 44 U/L Final    Anion Gap 06/30/2022 13  8 - 16 mmol/L Final    eGFR if  06/30/2022 60  >60 mL/min/1.73 m^2 Final    eGFR if non African American 06/30/2022 52 (A) >60 mL/min/1.73 m^2 Final    TSH 06/30/2022 2.104  0.400 - 4.000 uIU/mL Final    Free T4 06/30/2022 0.97  0.71 - 1.51 ng/dL Final      Lab Results   Component Value Date    WBC 6.37 06/30/2022    HGB 10.0 (L) 06/30/2022    HCT 31.9 (L) 06/30/2022     (H) 06/30/2022     06/30/2022         Chemistry        Component Value Date/Time     06/30/2022 0940    K 3.7 06/30/2022 0940     06/30/2022 0940    CO2 25 06/30/2022 0940    BUN 12 06/30/2022 0940    CREATININE 1.2 06/30/2022 0940     (H) 06/30/2022 0940        Component Value Date/Time    CALCIUM 9.6 06/30/2022 0940    ALKPHOS 80 06/30/2022 0940    AST 14 06/30/2022 0940    ALT 10 06/30/2022 0940    BILITOT 0.4 06/30/2022 0940    ESTGFRAFRICA 60 06/30/2022 0940    EGFRNONAA 52 (A) 06/30/2022 0940          ? IMAGING   No results found. However, due to the size of the patient record, not all encounters were searched. Please check Results Review for a complete set of results.  No results found. However, due to the size of the patient record, not all encounters were searched. Please check Results Review for a complete set of results.    PATHOLOGY  2/2021  Station 7 lymph node, fine needle aspiration (8 smears, 1 cell block):       -  Positive for malignant cells, morphologically consistent with   metastatic squamous cell carcinoma     Assessment/Plan:       1. Malignant neoplasm metastatic to lymph node of axilla    2. Malignant neoplasm of overlapping sites of right breast in female, estrogen receptor positive    3.  Secondary cancer of bone    4. Papillary thyroid carcinoma    5. Recurrent cervical cancer    6. Hypothyroidism, unspecified type          Plan:     # metastatic squamous cell carcinoma of the cervix SCC cervix:  history of HSIL Status post LEEP 2017. In December 2020 follow-up with gynecology with new spotty vaginal bleeding/discharge with biopsy 12/7/20 showing squamous cell carcinoma consistent with cervical primary.  MRI pelvis performed showing locally advanced disease with 4.9 cm cervical mass with right parametrial involvement and enlarged right external iliac lymph node 1.5 x 1.2 cm. PET-CT showing avidity of cervical mass with extension to lower uterine segment, right vaginal focus of avidity and lymphadenopathy including right internal external iliac, periaortic, pericaval.  There is the new hypermetabolic lymphadenopathy in right subcarinal 2.3 cm SUV 5.8. Small 9 mm right paratracheal S base slightly increased no FDG avid SUV 2.4.  01/29/2021 multidisciplinary tumor board discussion of her case with review of imaging; concern for new avid lymphadenopathy particularly in right subcarinal may be most consistent with metastatic cervical squamous cell carcinoma; tumor board recommendation for biopsy to confirm for prognostic information as well as given other concomitant malignancy to exclude alternative histology, although felt less likely metastatic breast given this is been well controlled and primarily bony disease involvement.  We discussed recommendation for systemic chemotherapy given advanced cervical squamous cell carcinoma with cisplatin, paclitaxel and bevacizumab with discontinuation of palbociclib but can continue aromatase inhibitor; taxane may also be active for her concomitant metastatic breast cancer.   - EBUS with biopsy 2/18/21, station 7 consistent with metastatic squamous cell carcinoma of cervical origin.  - STRATA requested on cervical SCC, ERBB3 <5% felt to be subclonal per report,  PIK3CA amplification, NY2150, KDM6A, FRANK, TMB low, PDL1 high may indicated sensitivity to check point inhibitor in future regimen.  - audiology exam 2/18/21, recommended that she let us know if any perceived change in her hearing throughout treatment and recommend repeat testing throughout to ensure no ototoxicity.  - [facility] germline genetic testing returned negative for mutation, provided to patient.  - cycle 1 day 1 cisplatin paclitaxel and bevacizumab on 02/22/2021  Repeat scans 04/26/2021 showed excellent improvement in thoracic and pelvic lymphadenopathy and primary cervical mass.  She is asymptomatic from this no further bleeding.  Recommended continuation of her current regimen which she is tolerating well with supportive care, IV fluids 3 times weekly per patient preference.  - restaging after cycle 6 she has continued improvement in metastatic cervical squamous cell carcinoma; avidity in left vulvar area diminished associated with known infection.  She has required substantial supportive care on chemotherapy and recommend given sustained improvement on scans continuing and absence of chemotherapy with bevacizumab alone and continue close surveillance.  She would like to continue IV fluids weekly as needed.    We reviewed in detail restaging PET-CT January 2022 notable for uptake in cervical region concerning for oligo progression/recurrence of her disease.  Previously biopsy proven metastatic cervical cancer in lungs without evidence of recurrent mass/lymphadenopathy/avidity in this region or otherwise.    Given prior PDL1 high disease CPS >1 we decided to proceed with chemoimmunotherapy per Keynote 826, carboplatin, paclitaxel and pembrolizumab.   She completed 6 cycles of carboplatin paclitaxel with pembrolizumab (1 cycle without pembrolizumab from unclear reasons with my colleague) with interval brachytherapy with Dr. Kaur at [facility].    Recommend restaging PET scan, ordered.   She is clinically doing very well and would plan to proceed with maintenance immunotherapy pembrolizumab Q 6 weeks.    # Hypothyroidism: Rising TSH and low free T4.  Currently on levothyroxine 250 mcg daily.  Will dose adjust as needed pending results of TSH free T4 pending.    # neuropathy:  Mild, will monitor with re-initiation of chemotherapy per above.    # metastatic breast cancer ER positive HER2 negative:  Had been on systemic therapy with palbociclib and letrozole. While on systemic chemo due to c/f cytopenias/toxicity palbociclib is on hold.  She is to continue on hormone lowering tx letrozole.   As she has now completed chemotherapy component pending restaging scans if she will be proceeding with maintenance immunotherapy alone will discuss re-initiation of palbociclib.    # bony metastatic disease:  on zoledronic acid dosed every 12 weeks. - Last zometa 1/18/22; next planned 4/12/22  DEXA 8/2021 without bone loss   Recent PET scan January 2022 without evidence of new bony metastatic disease.  She feels most comfortable with maintenance q.3 months which is reasonable.  Resolution of ENT/left ear infection.restarted zometa support.  Continue calcium vitamin-D supplementation.    # history of papillary thyroid carcinoma status post total thyroidectomy on 08/31/2017: s/p total thyroidectomy on levothyroxine.     # tobacco abuse:  Current tobacco use.   I have counseled for at least 3 min on the importance of tobacco cessation and discussed pharmacologic and therapy options to  aid in cessation.  She is interested in trial of bupropion prescribed to her local pharmacy.    Follow-Up:   Pembrolizumab maintenance today  PET scan prior to next cycle  Revisit 6 weeks with TSH free T4, CBC, CMP and pembrolizumab maintenance planned, will also be due for Zometa  Follow-up thyroid function tests and refill levothyroxine accordingly  At next visit would discuss palbociclib  restart

## 2022-06-30 NOTE — DISCHARGE INSTRUCTIONS
[facility]  [address] [facility]  [address]  [phone] phone     [phone] fax  Hours of Operation: Monday- Friday 8:00am- 5:00pm  After hours phone  [phone]  Hematology / Oncology Physicians on call      PRASANNA Everett Dr., Dr., VINNY Garrison, VINNY Prieto, ESTEBAN Mireles    Please call with any concerns regarding your appointment today.      FALL PREVENTION   Falls often occur due to slipping, tripping or losing your balance. Here are ways to reduce your risk of falling again.   Was there anything that caused your fall that can be fixed, removed or replaced?   Make your home safe by keeping walkways clear of objects you may trip over.   Use non-slip pads under rugs.   Do not walk in poorly lit areas.   Do not stand on chairs or wobbly ladders.   Use caution when reaching overhead or looking upward. This position can cause a loss of balance.   Be sure your shoes fit properly, have non-slip bottoms and are in good condition.   Be cautious when going up and down stairs, curbs, and when walking on uneven sidewalks.   If your balance is poor, consider using a cane or walker.   If your fall was related to alcohol use, stop or limit alcohol intake.   If your fall was related to use of sleeping medicines, talk to your doctor about this. You may need to reduce your dosage at bedtime if you awaken during the night to go to the bathroom.   To reduce the need for nighttime bathroom trips:   Avoid drinking fluids for several hours before going to bed   Empty your bladder before going to bed   Men can keep a urinal at the bedside   © 5318-0308 [employer] [facility], [address] [place] [address], [place] [address]. All rights reserved. This information is not intended as a substitute for professional medical care. Always follow your healthcare professional's instructions.

## 2022-06-30 NOTE — PLAN OF CARE
Problem: Adult Inpatient Plan of Care  Goal: Plan of Care Review  Outcome: Ongoing, Progressing  Flowsheets (Taken 6/30/2022 1123)  Plan of Care Reviewed With: patient  Goal: Patient-Specific Goal (Individualized)  Outcome: Ongoing, Progressing  Flowsheets (Taken 6/30/2022 1123)  Anxieties, Fears or Concerns: patient voices no concerns at this time  Individualized Care Needs: Feet elevated in recliner and warm blanket provided for comfort measures  Patient-Specific Goals (Include Timeframe): tolerate treatment without adverse reaction  Goal: Optimal Comfort and Wellbeing  Outcome: Ongoing, Progressing  Intervention: Provide Person-Centered Care  Flowsheets (Taken 6/30/2022 1123)  Trust Relationship/Rapport:   care explained   questions encouraged   choices provided   reassurance provided   emotional support provided   thoughts/feelings acknowledged   questions answered   empathic listening provided

## 2022-07-06 ENCOUNTER — TELEPHONE (OUTPATIENT)
Dept: HEMATOLOGY/ONCOLOGY | Facility: CLINIC | Age: 52
End: 2022-07-06
Payer: MEDICAID

## 2022-07-06 NOTE — TELEPHONE ENCOUNTER
Peer to Peer #  [phone]   Member id  [id number]       Nurse called [employer] to request peer to peer. They stated that this is a direct call and that the physicians needs to be on the line. Nurse will route to MD.     Can call at your convenience

## 2022-07-08 ENCOUNTER — TELEPHONE (OUTPATIENT)
Dept: HEMATOLOGY/ONCOLOGY | Facility: CLINIC | Age: 52
End: 2022-07-08
Payer: MEDICAID

## 2022-07-08 ENCOUNTER — PATIENT MESSAGE (OUTPATIENT)
Dept: HEMATOLOGY/ONCOLOGY | Facility: CLINIC | Age: 52
End: 2022-07-08
Payer: MEDICAID

## 2022-07-13 ENCOUNTER — HOSPITAL ENCOUNTER (EMERGENCY)
Facility: HOSPITAL | Age: 52
Discharge: LEFT BEFORE DISCHARGE | End: 2022-07-13
Attending: FAMILY MEDICINE
Payer: MEDICAID

## 2022-07-13 ENCOUNTER — PATIENT MESSAGE (OUTPATIENT)
Dept: HEMATOLOGY/ONCOLOGY | Facility: CLINIC | Age: 52
End: 2022-07-13
Payer: MEDICAID

## 2022-07-13 VITALS
HEART RATE: 98 BPM | RESPIRATION RATE: 18 BRPM | OXYGEN SATURATION: 96 % | HEIGHT: 67 IN | WEIGHT: 195.75 LBS | BODY MASS INDEX: 30.72 KG/M2 | TEMPERATURE: 99 F | SYSTOLIC BLOOD PRESSURE: 114 MMHG | DIASTOLIC BLOOD PRESSURE: 67 MMHG

## 2022-07-13 DIAGNOSIS — R53.1 WEAKNESS: Primary | ICD-10-CM

## 2022-07-13 LAB
CTP QC/QA: YES
SARS-COV-2 RDRP RESP QL NAA+PROBE: NEGATIVE

## 2022-07-13 PROCEDURE — 99282 EMERGENCY DEPT VISIT SF MDM: CPT

## 2022-07-13 PROCEDURE — U0002 COVID-19 LAB TEST NON-CDC: HCPCS | Performed by: EMERGENCY MEDICINE

## 2022-07-13 RX ORDER — ONDANSETRON 2 MG/ML
4 INJECTION INTRAMUSCULAR; INTRAVENOUS
Status: DISCONTINUED | OUTPATIENT
Start: 2022-07-13 | End: 2022-07-14 | Stop reason: HOSPADM

## 2022-07-14 NOTE — FIRST PROVIDER EVALUATION
"Medical screening exam completed.  I have conducted a focused provider triage encounter, findings are as follows:    Brief history of present illness:  51-year-old female presents emergency room with vomiting and weakness x3 days.  Patient has a history cancer.    Vitals:    07/13/22 1931   BP: 114/67   BP Location: Right arm   Patient Position: Sitting   Pulse: 98   Resp: 18   Temp: 98.6 °F (37 °C)   SpO2: 96%   Weight: 88.8 kg (195 lb 12.3 oz)   Height: 5' 7" (1.702 m)       Pertinent physical exam:  Speaking in full sentences, vital signs stable, no acute distress.    Brief workup plan:  Blood work, fluids, antiemetics    Preliminary workup initiated; this workup will be continued and followed by the physician or advanced practice provider that is assigned to the patient when roomed.  "

## 2022-07-15 ENCOUNTER — PATIENT MESSAGE (OUTPATIENT)
Dept: PULMONOLOGY | Facility: CLINIC | Age: 52
End: 2022-07-15
Payer: MEDICAID

## 2022-07-15 NOTE — ED PROVIDER NOTES
Encounter Date: 7/13/2022       History     Chief Complaint   Patient presents with    Vomiting     Pt c/o N/V X 3 days, c/o generalized weakness for 3 days. Denies cp, sob     HPI  Review of patient's allergies indicates:   Allergen Reactions    Gabapentin Swelling     Note: unilateral joint edema, unclear if due to gabapentin    Nsaids (non-steroidal anti-inflammatory drug) Other (See Comments)     Instructed not to take NSAID drugs with chemo pill.    Clindamycin Rash    Vancomycin analogues Itching     Past Medical History:   Diagnosis Date    Anxiety     Asthma     Breast cancer 2017    Cancer     right breast, metastatic-lymph nodes, bone, and thyroid     COPD (chronic obstructive pulmonary disease)     Depression     History of psychiatric hospitalization     2000; suicidal ideation    Hx of psychiatric care     Hypothyroidism     Miscarriage     five miscarriages    Obesity     Psychiatric problem     Thyroid cancer 2017     Past Surgical History:   Procedure Laterality Date    ABSCESS DRAINAGE      bilateral axilla    ADENOIDECTOMY      APPENDECTOMY      BREAST BIOPSY Right     x3    CHOLECYSTECTOMY      ENDOBRONCHIAL ULTRASOUND Bilateral 2/18/2021    Procedure: ENDOBRONCHIAL ULTRASOUND (EBUS);  Surgeon: Jaron Ni MD;  Location: [facility] ENDO;  Service: Pulmonary;  Laterality: Bilateral;    FLUOROSCOPY N/A 1/28/2021    Procedure: Mediport placement;  Surgeon: Noah Phelan MD;  Location: [facility] CATH LAB;  Service: General;  Laterality: N/A;    MASS EXCISION      MEDIPORT INSERTION, SINGLE Right 02/2021    SKIN GRAFT  06/16/2017    THYROIDECTOMY Bilateral     TONSILLECTOMY       Family History   Problem Relation Age of Onset    Arthritis Mother     Early death Mother         64 at time of death    Heart attack Mother     Heart disease Mother     Heart failure Mother     Hypertension Mother     Coronary artery disease Father     Hearing loss Father     Heart disease  Father     Hyperlipidemia Father     Hypertension Father     Mental illness Father     Learning disabilities Son     Cancer Maternal Aunt      Social History     Tobacco Use    Smoking status: Current Every Day Smoker     Packs/day: 1.00     Years: 32.00     Pack years: 32.00     Types: Cigarettes     Start date: 1985     Last attempt to quit: 2017     Years since quittin.0    Smokeless tobacco: Never Used    Tobacco comment: 45 pack year history   Substance Use Topics    Alcohol use: No    Drug use: No     Review of Systems    Physical Exam     Initial Vitals [22 1931]   BP Pulse Resp Temp SpO2   114/67 98 18 98.6 °F (37 °C) 96 %      MAP       --         Physical Exam    ED Course   Procedures  Labs Reviewed   SARS-COV-2 RDRP GENE    Narrative:     This test utilizes isothermal nucleic acid amplification   technology to detect the SARS-CoV-2 RdRp nucleic acid segment.   The analytical sensitivity (limit of detection) is 125 genome   equivalents/mL.   A POSITIVE result implies infection with the SARS-CoV-2 virus;   the patient is presumed to be contagious.     A NEGATIVE result means that SARS-CoV-2 nucleic acids are not   present above the limit of detection. A NEGATIVE result should be   treated as presumptive. It does not rule out the possibility of   COVID-19 and should not be the sole basis for treatment decisions.   If COVID-19 is strongly suspected based on clinical and exposure   history, re-testing using an alternate molecular assay should be   considered.   This test is only for use under the Food and Drug   Administration s Emergency Use Authorization (EUA).   Commercial kits are provided by [employer].   Performance characteristics of the EUA have been independently   verified by [facility] Department of   Pathology and Laboratory Medicine.   _________________________________________________________________   The authorized Fact Sheet for Healthcare  Providers and the authorized Fact   Sheet for Patients of the ID NOW COVID-19 are available on the FDA   website:     https://www.fda.gov/media/719982/download  https://www.fda.gov/media/084076/download                 Imaging Results    None          Medications - No data to display                       Clinical Impression:    vomiting     ED Disposition Condition    Eloped               Maranda Morgan MD  07/15/22 1707       Maranda Morgan MD  07/16/22 9482

## 2022-07-23 ENCOUNTER — HOSPITAL ENCOUNTER (EMERGENCY)
Facility: HOSPITAL | Age: 52
Discharge: HOME | End: 2022-07-23
Attending: EMERGENCY MEDICINE
Payer: MEDICAID

## 2022-07-23 VITALS
HEART RATE: 71 BPM | BODY MASS INDEX: 30.66 KG/M2 | TEMPERATURE: 98 F | SYSTOLIC BLOOD PRESSURE: 110 MMHG | RESPIRATION RATE: 18 BRPM | DIASTOLIC BLOOD PRESSURE: 60 MMHG | OXYGEN SATURATION: 98 % | HEIGHT: 67 IN

## 2022-07-23 DIAGNOSIS — N39.0 ACUTE LOWER UTI: ICD-10-CM

## 2022-07-23 DIAGNOSIS — A08.4 VIRAL GASTROENTERITIS: Primary | ICD-10-CM

## 2022-07-23 LAB
ALBUMIN SERPL BCP-MCNC: 3.6 G/DL (ref 3.5–5.2)
ALP SERPL-CCNC: 80 U/L (ref 55–135)
ALT SERPL W/O P-5'-P-CCNC: 11 U/L (ref 10–44)
ANION GAP SERPL CALC-SCNC: 11 MMOL/L (ref 8–16)
AST SERPL-CCNC: 12 U/L (ref 10–40)
BACTERIA #/AREA URNS HPF: ABNORMAL /HPF
BASOPHILS # BLD AUTO: 0.07 K/UL (ref 0–0.2)
BASOPHILS NFR BLD: 0.7 % (ref 0–1.9)
BILIRUB SERPL-MCNC: 0.7 MG/DL (ref 0.1–1)
BILIRUB UR QL STRIP: NEGATIVE
BUN SERPL-MCNC: 15 MG/DL (ref 6–20)
CALCIUM SERPL-MCNC: 8.4 MG/DL (ref 8.7–10.5)
CHLORIDE SERPL-SCNC: 103 MMOL/L (ref 95–110)
CLARITY UR: ABNORMAL
CO2 SERPL-SCNC: 24 MMOL/L (ref 23–29)
COLOR UR: YELLOW
CREAT SERPL-MCNC: 1.2 MG/DL (ref 0.5–1.4)
DIFFERENTIAL METHOD: ABNORMAL
EOSINOPHIL # BLD AUTO: 1.2 K/UL (ref 0–0.5)
EOSINOPHIL NFR BLD: 12.3 % (ref 0–8)
ERYTHROCYTE [DISTWIDTH] IN BLOOD BY AUTOMATED COUNT: 17.1 % (ref 11.5–14.5)
EST. GFR  (AFRICAN AMERICAN): 60 ML/MIN/1.73 M^2
EST. GFR  (NON AFRICAN AMERICAN): 52 ML/MIN/1.73 M^2
GLUCOSE SERPL-MCNC: 100 MG/DL (ref 70–110)
GLUCOSE UR QL STRIP: NEGATIVE
HCT VFR BLD AUTO: 35.5 % (ref 37–48.5)
HGB BLD-MCNC: 11.3 G/DL (ref 12–16)
HGB UR QL STRIP: ABNORMAL
HYALINE CASTS #/AREA URNS LPF: 0 /LPF
IMM GRANULOCYTES # BLD AUTO: 0.09 K/UL (ref 0–0.04)
IMM GRANULOCYTES NFR BLD AUTO: 0.9 % (ref 0–0.5)
KETONES UR QL STRIP: NEGATIVE
LEUKOCYTE ESTERASE UR QL STRIP: ABNORMAL
LIPASE SERPL-CCNC: 18 U/L (ref 4–60)
LYMPHOCYTES # BLD AUTO: 2.1 K/UL (ref 1–4.8)
LYMPHOCYTES NFR BLD: 20.5 % (ref 18–48)
MCH RBC QN AUTO: 33.4 PG (ref 27–31)
MCHC RBC AUTO-ENTMCNC: 31.8 G/DL (ref 32–36)
MCV RBC AUTO: 105 FL (ref 82–98)
MICROSCOPIC COMMENT: ABNORMAL
MONOCYTES # BLD AUTO: 0.9 K/UL (ref 0.3–1)
MONOCYTES NFR BLD: 8.6 % (ref 4–15)
NEUTROPHILS # BLD AUTO: 5.8 K/UL (ref 1.8–7.7)
NEUTROPHILS NFR BLD: 57 % (ref 38–73)
NITRITE UR QL STRIP: NEGATIVE
NRBC BLD-RTO: 0 /100 WBC
PH UR STRIP: 6 [PH] (ref 5–8)
PLATELET # BLD AUTO: 255 K/UL (ref 150–450)
PMV BLD AUTO: 11.5 FL (ref 9.2–12.9)
POTASSIUM SERPL-SCNC: 3.9 MMOL/L (ref 3.5–5.1)
PROT SERPL-MCNC: 6 G/DL (ref 6–8.4)
PROT UR QL STRIP: ABNORMAL
RBC # BLD AUTO: 3.38 M/UL (ref 4–5.4)
RBC #/AREA URNS HPF: 1 /HPF (ref 0–4)
SODIUM SERPL-SCNC: 138 MMOL/L (ref 136–145)
SP GR UR STRIP: 1.02 (ref 1–1.03)
SQUAMOUS #/AREA URNS HPF: 4 /HPF
URN SPEC COLLECT METH UR: ABNORMAL
UROBILINOGEN UR STRIP-ACNC: NEGATIVE EU/DL
WBC # BLD AUTO: 10.09 K/UL (ref 3.9–12.7)
WBC #/AREA URNS HPF: 10 /HPF (ref 0–5)

## 2022-07-23 PROCEDURE — 81000 URINALYSIS NONAUTO W/SCOPE: CPT | Performed by: EMERGENCY MEDICINE

## 2022-07-23 PROCEDURE — 96361 HYDRATE IV INFUSION ADD-ON: CPT

## 2022-07-23 PROCEDURE — 96374 THER/PROPH/DIAG INJ IV PUSH: CPT

## 2022-07-23 PROCEDURE — 96375 TX/PRO/DX INJ NEW DRUG ADDON: CPT

## 2022-07-23 PROCEDURE — 85025 COMPLETE CBC W/AUTO DIFF WBC: CPT | Performed by: NURSE PRACTITIONER

## 2022-07-23 PROCEDURE — 63600175 PHARM REV CODE 636 W HCPCS: Performed by: EMERGENCY MEDICINE

## 2022-07-23 PROCEDURE — 25000003 PHARM REV CODE 250: Performed by: NURSE PRACTITIONER

## 2022-07-23 PROCEDURE — 80053 COMPREHEN METABOLIC PANEL: CPT | Performed by: EMERGENCY MEDICINE

## 2022-07-23 PROCEDURE — 83690 ASSAY OF LIPASE: CPT | Performed by: EMERGENCY MEDICINE

## 2022-07-23 PROCEDURE — 99285 EMERGENCY DEPT VISIT HI MDM: CPT | Mod: 25

## 2022-07-23 RX ORDER — MORPHINE SULFATE 4 MG/ML
4 INJECTION, SOLUTION INTRAMUSCULAR; INTRAVENOUS
Status: COMPLETED | OUTPATIENT
Start: 2022-07-23 | End: 2022-07-23

## 2022-07-23 RX ORDER — CEPHALEXIN 500 MG/1
500 CAPSULE ORAL 4 TIMES DAILY
Qty: 28 CAPSULE | Refills: 0 | Status: SHIPPED | OUTPATIENT
Start: 2022-07-23 | End: 2022-07-30

## 2022-07-23 RX ORDER — DICYCLOMINE HYDROCHLORIDE 20 MG/1
20 TABLET ORAL 3 TIMES DAILY
Qty: 15 TABLET | Refills: 0 | Status: SHIPPED | OUTPATIENT
Start: 2022-07-23 | End: 2022-07-28

## 2022-07-23 RX ORDER — CEPHALEXIN 500 MG/1
500 CAPSULE ORAL 4 TIMES DAILY
Qty: 28 CAPSULE | Refills: 0 | Status: SHIPPED | OUTPATIENT
Start: 2022-07-23 | End: 2022-07-23 | Stop reason: SDUPTHER

## 2022-07-23 RX ORDER — LEVOTHYROXINE SODIUM 50 UG/1
50 TABLET ORAL
COMMUNITY
End: 2022-08-11

## 2022-07-23 RX ORDER — ONDANSETRON 4 MG/1
4 TABLET, FILM COATED ORAL EVERY 6 HOURS PRN
Qty: 12 TABLET | Refills: 0 | Status: SHIPPED | OUTPATIENT
Start: 2022-07-23 | End: 2022-09-23

## 2022-07-23 RX ORDER — ONDANSETRON 2 MG/ML
4 INJECTION INTRAMUSCULAR; INTRAVENOUS
Status: COMPLETED | OUTPATIENT
Start: 2022-07-23 | End: 2022-07-23

## 2022-07-23 RX ADMIN — SODIUM CHLORIDE 1000 ML: 0.9 INJECTION, SOLUTION INTRAVENOUS at 05:07

## 2022-07-23 RX ADMIN — MORPHINE SULFATE 4 MG: 4 INJECTION INTRAVENOUS at 05:07

## 2022-07-23 RX ADMIN — ONDANSETRON 4 MG: 2 INJECTION INTRAMUSCULAR; INTRAVENOUS at 05:07

## 2022-07-23 NOTE — ED PROVIDER NOTES
SCRIBE #1 NOTE: I, Van Luan, am scribing for, and in the presence of, Aleksey Lima Jr., MD. I have scribed the entire note.       History     Chief Complaint   Patient presents with    Emesis     X 3 days. Reports can keep down liquid but no food. Also reports diarrhea and chills. Denies abd pain. Hx of cancer     Review of patient's allergies indicates:   Allergen Reactions    Gabapentin Swelling     Note: unilateral joint edema, unclear if due to gabapentin    Nsaids (non-steroidal anti-inflammatory drug) Other (See Comments)     Instructed not to take NSAID drugs with chemo pill.    Clindamycin Rash    Vancomycin analogues Itching         History of Present Illness     HPI    7/23/2022, 5:46 PM  History obtained from the patient      History of Present Illness: Josey Flores is a 51 y.o. female patient with a PMHx of COPD, hypothyroidism, and cervical cancer who presents to the Emergency Department for evaluation of vomiting x 3 days after eating food. Pt states that she underwent chemotherapy for her cervical cancer x 2-3 weeks pta and pain has since improved. Symptoms are episodic and moderate in severity. No mitigating or exacerbating factors reported. Associated sxs include diarrhea which has since resolved and abd cramping. Patient denies any fever,chills, HA, weakness, CP, SOB, and all other sxs at this time. No prior tx reported. No further complaints or concerns at this time.       Arrival mode: Personal vehicle     PCP: Kishore Persaud MD        Past Medical History:  Past Medical History:   Diagnosis Date    Anxiety     Asthma     Breast cancer 2017    Cancer     right breast, metastatic-lymph nodes, bone, and thyroid     COPD (chronic obstructive pulmonary disease)     Depression     History of psychiatric hospitalization     2000; suicidal ideation    Hx of psychiatric care     Hypothyroidism     Miscarriage     five miscarriages    Obesity     Psychiatric problem      Thyroid cancer 2017       Past Surgical History:  Past Surgical History:   Procedure Laterality Date    ABSCESS DRAINAGE      bilateral axilla    ADENOIDECTOMY      APPENDECTOMY      BREAST BIOPSY Right     x3    CHOLECYSTECTOMY      ENDOBRONCHIAL ULTRASOUND Bilateral 2021    Procedure: ENDOBRONCHIAL ULTRASOUND (EBUS);  Surgeon: Jaron Ni MD;  Location: [facility] ENDO;  Service: Pulmonary;  Laterality: Bilateral;    FLUOROSCOPY N/A 2021    Procedure: Mediport placement;  Surgeon: Noah Phelan MD;  Location: [facility] CATH LAB;  Service: General;  Laterality: N/A;    MASS EXCISION      MEDIPORT INSERTION, SINGLE Right 2021    SKIN GRAFT  2017    THYROIDECTOMY Bilateral     TONSILLECTOMY           Family History:  Family History   Problem Relation Age of Onset    Arthritis Mother     Early death Mother         64 at time of death    Heart attack Mother     Heart disease Mother     Heart failure Mother     Hypertension Mother     Coronary artery disease Father     Hearing loss Father     Heart disease Father     Hyperlipidemia Father     Hypertension Father     Mental illness Father     Learning disabilities Son     Cancer Maternal Aunt        Social History:  Social History     Tobacco Use    Smoking status: Current Every Day Smoker     Packs/day: 1.00     Years: 32.00     Pack years: 32.00     Types: Cigarettes     Start date: 1985     Last attempt to quit: 2017     Years since quittin.1    Smokeless tobacco: Never Used    Tobacco comment: 45 pack year history   Substance and Sexual Activity    Alcohol use: No    Drug use: No    Sexual activity: Yes     Partners: Male        Review of Systems     Review of Systems   Constitutional: Negative for chills and fever.   HENT: Negative for sore throat.    Respiratory: Negative for shortness of breath.    Cardiovascular: Negative for chest pain.   Gastrointestinal: Positive for abdominal pain, diarrhea  "(Resolved), nausea and vomiting.   Genitourinary: Negative for dysuria.   Musculoskeletal: Negative for back pain.   Skin: Negative for rash.   Neurological: Negative for weakness and headaches.   Hematological: Does not bruise/bleed easily.   All other systems reviewed and are negative.     Physical Exam     Initial Vitals [07/23/22 1544]   BP Pulse Resp Temp SpO2   113/70 88 18 98 °F (36.7 °C) 98 %      MAP       --          Physical Exam  Nursing Notes and Vital Signs Reviewed.  Constitutional: Patient is in no acute distress. Well-developed and well-nourished.  Head: Atraumatic. Normocephalic.  Eyes:  EOM intact.  No scleral icterus.  ENT: Mucous membranes are moist.  Nares clear   Neck:  Full ROM. No JVD.  Cardiovascular: Regular rate. Regular rhythm No murmurs, rubs, or gallops. Distal pulses are 2+ and symmetric  Pulmonary/Chest: No respiratory distress. Clear to auscultation bilaterally. No wheezing or rales.  Equal chest wall rise bilaterally  Abdominal: Soft and non-distended.  There is no tenderness.  No rebound, guarding, or rigidity. Good bowel sounds.  Genitourinary: No CVA tenderness.  No suprapubic tenderness  Musculoskeletal: Moves all extremities. No obvious deformities.  5 x 5 strength in all extremities   Skin: Warm and dry.  Neurological:  Alert, awake, and appropriate.  Normal speech.  No acute focal neurological deficits are appreciated.  Two through 12 intact bilaterally.  Psychiatric: Normal affect. Good eye contact. Appropriate in content.     ED Course   Procedures  ED Vital Signs:  Vitals:    07/23/22 1544 07/23/22 1750 07/23/22 1924 07/23/22 2000   BP: 113/70  114/64 (!) 102/51   Pulse: 88  66 70   Resp: 18 18     Temp: 98 °F (36.7 °C)      TempSrc: Oral      SpO2: 98%  99% 98%   Height: 5' 7" (1.702 m)       07/23/22 2032   BP: 110/60   Pulse: 71   Resp: 18   Temp: 98.3 °F (36.8 °C)   TempSrc: Oral   SpO2: 98%   Height:        Abnormal Lab Results:  Labs Reviewed   CBC W/ AUTO " DIFFERENTIAL - Abnormal; Notable for the following components:       Result Value    RBC 3.38 (*)     Hemoglobin 11.3 (*)     Hematocrit 35.5 (*)      (*)     MCH 33.4 (*)     MCHC 31.8 (*)     RDW 17.1 (*)     Immature Granulocytes 0.9 (*)     Immature Grans (Abs) 0.09 (*)     Eos # 1.2 (*)     Eosinophil % 12.3 (*)     All other components within normal limits   URINALYSIS, REFLEX TO URINE CULTURE - Abnormal; Notable for the following components:    Appearance, UA Cloudy (*)     Protein, UA 1+ (*)     Occult Blood UA Trace (*)     Leukocytes, UA 2+ (*)     All other components within normal limits    Narrative:     Specimen Source->Urine   COMPREHENSIVE METABOLIC PANEL - Abnormal; Notable for the following components:    Calcium 8.4 (*)     eGFR if non  52 (*)     All other components within normal limits   URINALYSIS MICROSCOPIC - Abnormal; Notable for the following components:    WBC, UA 10 (*)     All other components within normal limits    Narrative:     Specimen Source->Urine   LIPASE   LIPASE        All Lab Results:  Results for orders placed or performed during the hospital encounter of 07/23/22   CBC auto differential   Result Value Ref Range    WBC 10.09 3.90 - 12.70 K/uL    RBC 3.38 (L) 4.00 - 5.40 M/uL    Hemoglobin 11.3 (L) 12.0 - 16.0 g/dL    Hematocrit 35.5 (L) 37.0 - 48.5 %     (H) 82 - 98 fL    MCH 33.4 (H) 27.0 - 31.0 pg    MCHC 31.8 (L) 32.0 - 36.0 g/dL    RDW 17.1 (H) 11.5 - 14.5 %    Platelets 255 150 - 450 K/uL    MPV 11.5 9.2 - 12.9 fL    Immature Granulocytes 0.9 (H) 0.0 - 0.5 %    Gran # (ANC) 5.8 1.8 - 7.7 K/uL    Immature Grans (Abs) 0.09 (H) 0.00 - 0.04 K/uL    Lymph # 2.1 1.0 - 4.8 K/uL    Mono # 0.9 0.3 - 1.0 K/uL    Eos # 1.2 (H) 0.0 - 0.5 K/uL    Baso # 0.07 0.00 - 0.20 K/uL    nRBC 0 0 /100 WBC    Gran % 57.0 38.0 - 73.0 %    Lymph % 20.5 18.0 - 48.0 %    Mono % 8.6 4.0 - 15.0 %    Eosinophil % 12.3 (H) 0.0 - 8.0 %    Basophil % 0.7 0.0 - 1.9 %     Differential Method Automated    Urinalysis, Reflex to Urine Culture Urine, Clean Catch    Specimen: Urine   Result Value Ref Range    Specimen UA Urine, Clean Catch     Color, UA Yellow Yellow, Straw, [place]    Appearance, UA Cloudy (A) Clear    pH, UA 6.0 5.0 - 8.0    Specific Gravity, UA 1.020 1.005 - 1.030    Protein, UA 1+ (A) Negative    Glucose, UA Negative Negative    Ketones, UA Negative Negative    Bilirubin (UA) Negative Negative    Occult Blood UA Trace (A) Negative    Nitrite, UA Negative Negative    Urobilinogen, UA Negative <2.0 EU/dL    Leukocytes, UA 2+ (A) Negative   Comprehensive metabolic panel   Result Value Ref Range    Sodium 138 136 - 145 mmol/L    Potassium 3.9 3.5 - 5.1 mmol/L    Chloride 103 95 - 110 mmol/L    CO2 24 23 - 29 mmol/L    Glucose 100 70 - 110 mg/dL    BUN 15 6 - 20 mg/dL    Creatinine 1.2 0.5 - 1.4 mg/dL    Calcium 8.4 (L) 8.7 - 10.5 mg/dL    Total Protein 6.0 6.0 - 8.4 g/dL    Albumin 3.6 3.5 - 5.2 g/dL    Total Bilirubin 0.7 0.1 - 1.0 mg/dL    Alkaline Phosphatase 80 55 - 135 U/L    AST 12 10 - 40 U/L    ALT 11 10 - 44 U/L    Anion Gap 11 8 - 16 mmol/L    eGFR if African American 60 >60 mL/min/1.73 m^2    eGFR if non African American 52 (A) >60 mL/min/1.73 m^2   Urinalysis Microscopic   Result Value Ref Range    RBC, UA 1 0 - 4 /hpf    WBC, UA 10 (H) 0 - 5 /hpf    Bacteria Occasional None-Occ /hpf    Squam Epithel, UA 4 /hpf    Hyaline Casts, UA 0 0-1/lpf /lpf    Microscopic Comment SEE COMMENT    Lipase   Result Value Ref Range    Lipase 18 4 - 60 U/L     *Note: Due to a large number of results and/or encounters for the requested time period, some results have not been displayed. A complete set of results can be found in Results Review.       Imaging Results:  Imaging Results          CT Abdomen Pelvis  Without Contrast (Final result)  Result time 07/23/22 18:51:54    Final result by Sherron St MD (07/23/22 18:51:54)                 Impression:      No acute process.   Attention at time of follow-up oncological follow-up given history of cervical cancer    All CT scans   are performed using dose optimization techniques including the following: automated exposure control; adjustment of the mA and/or kV; use of iterative reconstruction technique.  Dose modulation was employed for ALARA by means of: Automated exposure control; adjustment of the mA and/or kV according to patient size (this includes techniques or standardized protocols for targeted exams where dose is matched to indication/reason for exam; i.e. extremities or head); and/or use of iterative reconstructive technique.      Electronically signed by: Maciel Siu  Date:    07/23/2022  Time:    18:51             Narrative:    EXAMINATION:  CT ABDOMEN PELVIS WITHOUT CONTRAST    CLINICAL HISTORY:  Bowel obstruction suspected;    TECHNIQUE:  Low dose axial images, sagittal and coronal reformations were obtained from the lung bases to the pubic symphysis, Oral contrast was not administered.    COMPARISON:  None    FINDINGS:  Heart: Normal in size as far as seen.  No pericardial effusion as far seen.    Lung Bases: Well aerated, without consolidation or pleural fluid.    Liver: Hepatomegaly.  With no focal hepatic lesions.    Gallbladder: Cholecystectomy    Bile Ducts: No evidence of dilated ducts.    Pancreas: No mass or peripancreatic fat stranding.    Spleen: Splenic granuloma.    Adrenals: Unremarkable.    Kidneys/ Ureters: Unremarkable.    Bladder: No evidence of wall thickening.    Reproductive organs: Surgical clips between the bladder and the uterus.  Correlate to surgical history.  There is a report of a cervical mass on prior CT which is not well evaluated on today's exam.    GI Tract/Mesentery: No evidence of bowel obstruction or inflammation. No secondary signs of appendicitis.    Peritoneal Space: No ascites. No free air.    Retroperitoneum: No significant adenopathy.    Abdominal wall: Unremarkable.    Vasculature:  No aneurysm.    Bones: No acute fracture.                                        The Emergency Provider reviewed the vital signs and test results, which are outlined above.     ED Discussion     8:04 PM: Reassessed pt at this time. Discussed with pt all pertinent ED information and results. Discussed pt dx and plan of tx. Gave pt all f/u and return to the ED instructions. All questions and concerns were addressed at this time. Pt expresses understanding of information and instructions, and is comfortable with plan to discharge. Pt is stable for discharge.    I discussed with patient and/or family/caretaker that evaluation in the ED does not suggest any emergent or life threatening medical conditions requiring immediate intervention beyond what was provided in the ED, and I believe patient is safe for discharge.  Regardless, an unremarkable evaluation in the ED does not preclude the development or presence of a serious of life threatening condition. As such, patient was instructed to return immediately for any worsening or change in current symptoms.    Patient is stable and nontoxic.  Patient has had nausea vomiting and several episodes of diarrhea yesterday.  She feels much better with IV fluids.  She has no abdominal pain.  She does have a history of cancer but last chemoradiation was a week ago.  She denies any sick contacts.  Her abdominal exam is nontender.  CT scan is benign.  Patient is feeling much better with treatment I will treat home with Bentyl and Zofran.  Patient also has a mild UTI which I will treat with Keflex.  Patient verbalized agreement understanding with all instructions seems reliable.  She is safe for discharge my opinion.     Medical Decision Making:   Clinical Tests:   Lab Tests: Ordered and Reviewed  Radiological Study: Ordered and Reviewed           ED Medication(s):  Medications   sodium chloride 0.9% bolus 1,000 mL (0 mLs Intravenous Stopped 7/23/22 2012)   morphine injection 4 mg (4  mg Intravenous Given 7/23/22 1750)   ondansetron injection 4 mg (4 mg Intravenous Given 7/23/22 1750)       Discharge Medication List as of 7/23/2022  8:17 PM      START taking these medications    Details   dicyclomine (BENTYL) 20 mg tablet Take 1 tablet (20 mg total) by mouth 3 (three) times daily. for 15 doses, Starting Sat 7/23/2022, Until Thu 7/28/2022, Print      ondansetron (ZOFRAN) 4 MG tablet Take 1 tablet (4 mg total) by mouth every 6 (six) hours as needed for Nausea., Starting Sat 7/23/2022, Print      cephALEXin (KEFLEX) 500 MG capsule Take 1 capsule (500 mg total) by mouth 4 (four) times daily. for 7 days, Starting Sat 7/23/2022, Until Sat 7/30/2022, Normal              Follow-up Information     Kishore Persaud MD.    Specialty: Family Medicine  Contact information:  [address] [facility] [place] [address] [phone]                             Scribe Attestation:   Scribe #1: I performed the above scribed service and the documentation accurately describes the services I performed. I attest to the accuracy of the note.     Attending:   Physician Attestation Statement for Scribe #1: I, Aleksey Lima Jr., MD, personally performed the services described in this documentation, as scribed by Van Roland, in my presence, and it is both accurate and complete.           Clinical Impression       ICD-10-CM ICD-9-CM   1. Viral gastroenteritis  A08.4 008.8   2. Acute lower UTI  N39.0 599.0       Disposition:   Disposition: Discharged  Condition: Stable         Aleksey Lima Jr., MD  07/23/22 2049       Aleksey Lima Jr., MD  07/23/22 2051

## 2022-07-23 NOTE — FIRST PROVIDER EVALUATION
"Medical screening exam completed.  I have conducted a focused provider triage encounter, findings are as follows:    Brief history of present illness:  Pre ones of nausea vomiting and dehydration patient has multi system cancer    Vitals:    07/23/22 1544   BP: 113/70   BP Location: Right arm   Patient Position: Sitting   Pulse: 88   Resp: 18   Temp: 98 °F (36.7 °C)   TempSrc: Oral   SpO2: 98%   Height: 5' 7" (1.702 m)       Pertinent physical exam:  Patient appears fatigued    Brief workup plan:  Labs fluids    Preliminary workup initiated; this workup will be continued and followed by the physician or advanced practice provider that is assigned to the patient when roomed.  "

## 2022-07-24 NOTE — DISCHARGE INSTRUCTIONS
Zofran for nausea and Bentyl for cramps.  Drink plenty of fluids.  [place] diet advance as tolerated.  Use Keflex review mild bladder infection.  Follow up with her doctor 1-2 days for re-evaluation.  Return as needed for any worsening symptoms, problems, questions or concerns.

## 2022-07-25 DIAGNOSIS — F41.9 ANXIETY: ICD-10-CM

## 2022-07-25 RX ORDER — ALPRAZOLAM 1 MG/1
.5-1 TABLET ORAL 2 TIMES DAILY PRN
Qty: 60 TABLET | Refills: 0 | Status: SHIPPED | OUTPATIENT
Start: 2022-07-25 | End: 2022-08-24 | Stop reason: SDUPTHER

## 2022-08-05 ENCOUNTER — HOSPITAL ENCOUNTER (OUTPATIENT)
Dept: RADIOLOGY | Facility: HOSPITAL | Age: 52
Discharge: HOME | End: 2022-08-05
Attending: INTERNAL MEDICINE
Payer: MEDICAID

## 2022-08-05 ENCOUNTER — INFUSION (OUTPATIENT)
Dept: INFUSION THERAPY | Facility: HOSPITAL | Age: 52
End: 2022-08-05
Attending: INTERNAL MEDICINE
Payer: MEDICAID

## 2022-08-05 VITALS
TEMPERATURE: 98 F | RESPIRATION RATE: 18 BRPM | DIASTOLIC BLOOD PRESSURE: 77 MMHG | HEART RATE: 75 BPM | SYSTOLIC BLOOD PRESSURE: 117 MMHG | OXYGEN SATURATION: 98 %

## 2022-08-05 DIAGNOSIS — T45.1X5A CHEMOTHERAPY-INDUCED NAUSEA: ICD-10-CM

## 2022-08-05 DIAGNOSIS — C77.3 MALIGNANT NEOPLASM METASTATIC TO LYMPH NODE OF AXILLA: ICD-10-CM

## 2022-08-05 DIAGNOSIS — C73 PAPILLARY THYROID CARCINOMA: ICD-10-CM

## 2022-08-05 DIAGNOSIS — C53.9 RECURRENT CERVICAL CANCER: ICD-10-CM

## 2022-08-05 DIAGNOSIS — L29.9 PRURITUS: Primary | ICD-10-CM

## 2022-08-05 DIAGNOSIS — R11.0 CHEMOTHERAPY-INDUCED NAUSEA: ICD-10-CM

## 2022-08-05 DIAGNOSIS — C79.51 SECONDARY CANCER OF BONE: ICD-10-CM

## 2022-08-05 PROCEDURE — A4216 STERILE WATER/SALINE, 10 ML: HCPCS | Performed by: INTERNAL MEDICINE

## 2022-08-05 PROCEDURE — 78815 PET IMAGE W/CT SKULL-THIGH: CPT | Mod: TC

## 2022-08-05 PROCEDURE — 25000003 PHARM REV CODE 250: Performed by: INTERNAL MEDICINE

## 2022-08-05 PROCEDURE — 78815 PET IMAGE W/CT SKULL-THIGH: CPT | Mod: 26,PS,, | Performed by: RADIOLOGY

## 2022-08-05 PROCEDURE — 78815 NM PET CT ROUTINE: ICD-10-PCS | Mod: 26,PS,, | Performed by: RADIOLOGY

## 2022-08-05 PROCEDURE — 96523 IRRIG DRUG DELIVERY DEVICE: CPT

## 2022-08-05 PROCEDURE — 63600175 PHARM REV CODE 636 W HCPCS: Performed by: INTERNAL MEDICINE

## 2022-08-05 RX ORDER — HEPARIN 100 UNIT/ML
500 SYRINGE INTRAVENOUS
Status: DISCONTINUED | OUTPATIENT
Start: 2022-08-05 | End: 2022-08-05 | Stop reason: HOSPADM

## 2022-08-05 RX ORDER — HEPARIN 100 UNIT/ML
500 SYRINGE INTRAVENOUS
Status: CANCELLED | OUTPATIENT
Start: 2022-08-05

## 2022-08-05 RX ORDER — SODIUM CHLORIDE 0.9 % (FLUSH) 0.9 %
10 SYRINGE (ML) INJECTION
Status: CANCELLED | OUTPATIENT
Start: 2022-08-05

## 2022-08-05 RX ORDER — SODIUM CHLORIDE 0.9 % (FLUSH) 0.9 %
10 SYRINGE (ML) INJECTION
Status: DISCONTINUED | OUTPATIENT
Start: 2022-08-05 | End: 2022-08-05 | Stop reason: HOSPADM

## 2022-08-05 RX ADMIN — Medication 10 ML: at 12:08

## 2022-08-05 RX ADMIN — HEPARIN 500 UNITS: 100 SYRINGE at 12:08

## 2022-08-05 NOTE — DISCHARGE INSTRUCTIONS
.[facility]  [address] [address]  [address]  [phone] phone     [phone] fax  Hours of Operation: Monday- Friday 8:00am- 5:00pm  After hours phone  [phone]  Hematology / Oncology Physicians on call    Dr. Jake Eden      Nurse Practitioners:    Virginie Garrison, VINNY Sy, VINNY Dee, VINNY Prieto, VINNY Olivas, RYAN Gutiérrez      Please don't hesitate to call if you have any concerns.

## 2022-08-09 ENCOUNTER — PATIENT MESSAGE (OUTPATIENT)
Dept: ADMINISTRATIVE | Facility: HOSPITAL | Age: 52
End: 2022-08-09
Payer: MEDICAID

## 2022-08-11 ENCOUNTER — PATIENT MESSAGE (OUTPATIENT)
Dept: HEMATOLOGY/ONCOLOGY | Facility: CLINIC | Age: 52
End: 2022-08-11

## 2022-08-11 ENCOUNTER — INFUSION (OUTPATIENT)
Dept: INFUSION THERAPY | Facility: HOSPITAL | Age: 52
End: 2022-08-11
Attending: INTERNAL MEDICINE
Payer: MEDICAID

## 2022-08-11 ENCOUNTER — LAB VISIT (OUTPATIENT)
Dept: LAB | Facility: HOSPITAL | Age: 52
End: 2022-08-11
Attending: INTERNAL MEDICINE
Payer: MEDICAID

## 2022-08-11 ENCOUNTER — OFFICE VISIT (OUTPATIENT)
Dept: HEMATOLOGY/ONCOLOGY | Facility: CLINIC | Age: 52
End: 2022-08-11
Payer: MEDICAID

## 2022-08-11 ENCOUNTER — SPECIALTY PHARMACY (OUTPATIENT)
Dept: PHARMACY | Facility: CLINIC | Age: 52
End: 2022-08-11
Payer: MEDICAID

## 2022-08-11 VITALS
SYSTOLIC BLOOD PRESSURE: 105 MMHG | OXYGEN SATURATION: 98 % | WEIGHT: 195.31 LBS | TEMPERATURE: 98 F | HEIGHT: 67 IN | HEART RATE: 75 BPM | DIASTOLIC BLOOD PRESSURE: 71 MMHG | BODY MASS INDEX: 30.65 KG/M2 | RESPIRATION RATE: 18 BRPM

## 2022-08-11 VITALS
HEART RATE: 98 BPM | BODY MASS INDEX: 30.65 KG/M2 | HEIGHT: 67 IN | OXYGEN SATURATION: 98 % | WEIGHT: 195.31 LBS | DIASTOLIC BLOOD PRESSURE: 69 MMHG | SYSTOLIC BLOOD PRESSURE: 107 MMHG | TEMPERATURE: 98 F

## 2022-08-11 DIAGNOSIS — E03.9 HYPOTHYROIDISM, UNSPECIFIED TYPE: ICD-10-CM

## 2022-08-11 DIAGNOSIS — Z17.0 MALIGNANT NEOPLASM OF OVERLAPPING SITES OF RIGHT BREAST IN FEMALE, ESTROGEN RECEPTOR POSITIVE: Primary | ICD-10-CM

## 2022-08-11 DIAGNOSIS — Z17.0 MALIGNANT NEOPLASM OF OVERLAPPING SITES OF RIGHT BREAST IN FEMALE, ESTROGEN RECEPTOR POSITIVE: ICD-10-CM

## 2022-08-11 DIAGNOSIS — C50.919 PRIMARY MALIGNANT NEOPLASM OF BREAST WITH METASTASIS TO OTHER SITE, UNSPECIFIED LATERALITY: Primary | ICD-10-CM

## 2022-08-11 DIAGNOSIS — C79.51 SECONDARY CANCER OF BONE: ICD-10-CM

## 2022-08-11 DIAGNOSIS — C50.811 MALIGNANT NEOPLASM OF OVERLAPPING SITES OF RIGHT BREAST IN FEMALE, ESTROGEN RECEPTOR POSITIVE: ICD-10-CM

## 2022-08-11 DIAGNOSIS — C53.0 MALIGNANT NEOPLASM OF ENDOCERVIX: ICD-10-CM

## 2022-08-11 DIAGNOSIS — N39.0 URINARY TRACT INFECTION WITHOUT HEMATURIA, SITE UNSPECIFIED: ICD-10-CM

## 2022-08-11 DIAGNOSIS — C53.9 RECURRENT CERVICAL CANCER: ICD-10-CM

## 2022-08-11 DIAGNOSIS — C77.3 MALIGNANT NEOPLASM METASTATIC TO LYMPH NODE OF AXILLA: Primary | ICD-10-CM

## 2022-08-11 DIAGNOSIS — C50.811 MALIGNANT NEOPLASM OF OVERLAPPING SITES OF RIGHT BREAST IN FEMALE, ESTROGEN RECEPTOR POSITIVE: Primary | ICD-10-CM

## 2022-08-11 DIAGNOSIS — C73 PAPILLARY THYROID CARCINOMA: ICD-10-CM

## 2022-08-11 LAB
ALBUMIN SERPL BCP-MCNC: 3.8 G/DL (ref 3.5–5.2)
ALP SERPL-CCNC: 74 U/L (ref 55–135)
ALT SERPL W/O P-5'-P-CCNC: 15 U/L (ref 10–44)
ANION GAP SERPL CALC-SCNC: 11 MMOL/L (ref 8–16)
AST SERPL-CCNC: 14 U/L (ref 10–40)
BASOPHILS # BLD AUTO: 0.05 K/UL (ref 0–0.2)
BASOPHILS NFR BLD: 0.6 % (ref 0–1.9)
BILIRUB SERPL-MCNC: 0.4 MG/DL (ref 0.1–1)
BUN SERPL-MCNC: 11 MG/DL (ref 6–20)
CALCIUM SERPL-MCNC: 9.6 MG/DL (ref 8.7–10.5)
CHLORIDE SERPL-SCNC: 103 MMOL/L (ref 95–110)
CO2 SERPL-SCNC: 26 MMOL/L (ref 23–29)
CREAT SERPL-MCNC: 1.3 MG/DL (ref 0.5–1.4)
DIFFERENTIAL METHOD: ABNORMAL
EOSINOPHIL # BLD AUTO: 1 K/UL (ref 0–0.5)
EOSINOPHIL NFR BLD: 11.6 % (ref 0–8)
ERYTHROCYTE [DISTWIDTH] IN BLOOD BY AUTOMATED COUNT: 16.3 % (ref 11.5–14.5)
EST. GFR  (NO RACE VARIABLE): 49 ML/MIN/1.73 M^2
GLUCOSE SERPL-MCNC: 119 MG/DL (ref 70–110)
HCT VFR BLD AUTO: 34.7 % (ref 37–48.5)
HGB BLD-MCNC: 11.2 G/DL (ref 12–16)
IMM GRANULOCYTES # BLD AUTO: 0.11 K/UL (ref 0–0.04)
IMM GRANULOCYTES NFR BLD AUTO: 1.3 % (ref 0–0.5)
LYMPHOCYTES # BLD AUTO: 2 K/UL (ref 1–4.8)
LYMPHOCYTES NFR BLD: 22.3 % (ref 18–48)
MCH RBC QN AUTO: 33.5 PG (ref 27–31)
MCHC RBC AUTO-ENTMCNC: 32.3 G/DL (ref 32–36)
MCV RBC AUTO: 104 FL (ref 82–98)
MONOCYTES # BLD AUTO: 0.7 K/UL (ref 0.3–1)
MONOCYTES NFR BLD: 8.3 % (ref 4–15)
NEUTROPHILS # BLD AUTO: 4.9 K/UL (ref 1.8–7.7)
NEUTROPHILS NFR BLD: 55.9 % (ref 38–73)
NRBC BLD-RTO: 0 /100 WBC
PLATELET # BLD AUTO: 258 K/UL (ref 150–450)
PMV BLD AUTO: 10.4 FL (ref 9.2–12.9)
POTASSIUM SERPL-SCNC: 3.9 MMOL/L (ref 3.5–5.1)
PROT SERPL-MCNC: 7 G/DL (ref 6–8.4)
RBC # BLD AUTO: 3.34 M/UL (ref 4–5.4)
SODIUM SERPL-SCNC: 140 MMOL/L (ref 136–145)
T4 FREE SERPL-MCNC: 0.77 NG/DL (ref 0.71–1.51)
TSH SERPL DL<=0.005 MIU/L-ACNC: 15.99 UIU/ML (ref 0.4–4)
WBC # BLD AUTO: 8.8 K/UL (ref 3.9–12.7)

## 2022-08-11 PROCEDURE — 96413 CHEMO IV INFUSION 1 HR: CPT

## 2022-08-11 PROCEDURE — A4216 STERILE WATER/SALINE, 10 ML: HCPCS

## 2022-08-11 PROCEDURE — 84443 ASSAY THYROID STIM HORMONE: CPT | Performed by: INTERNAL MEDICINE

## 2022-08-11 PROCEDURE — 1160F PR REVIEW ALL MEDS BY PRESCRIBER/CLIN PHARMACIST DOCUMENTED: ICD-10-PCS | Mod: CPTII,,, | Performed by: INTERNAL MEDICINE

## 2022-08-11 PROCEDURE — 63600175 PHARM REV CODE 636 W HCPCS

## 2022-08-11 PROCEDURE — 25000003 PHARM REV CODE 250: Performed by: INTERNAL MEDICINE

## 2022-08-11 PROCEDURE — 3008F PR BODY MASS INDEX (BMI) DOCUMENTED: ICD-10-PCS | Mod: CPTII,,, | Performed by: INTERNAL MEDICINE

## 2022-08-11 PROCEDURE — 99215 OFFICE O/P EST HI 40 MIN: CPT | Mod: PBBFAC,25 | Performed by: INTERNAL MEDICINE

## 2022-08-11 PROCEDURE — 36415 COLL VENOUS BLD VENIPUNCTURE: CPT | Performed by: INTERNAL MEDICINE

## 2022-08-11 PROCEDURE — 80053 COMPREHEN METABOLIC PANEL: CPT | Performed by: INTERNAL MEDICINE

## 2022-08-11 PROCEDURE — 99999 PR PBB SHADOW E&M-EST. PATIENT-LVL V: CPT | Mod: PBBFAC,,, | Performed by: INTERNAL MEDICINE

## 2022-08-11 PROCEDURE — 63600175 PHARM REV CODE 636 W HCPCS: Mod: JG | Performed by: INTERNAL MEDICINE

## 2022-08-11 PROCEDURE — 99999 PR PBB SHADOW E&M-EST. PATIENT-LVL V: ICD-10-PCS | Mod: PBBFAC,,, | Performed by: INTERNAL MEDICINE

## 2022-08-11 PROCEDURE — 25000003 PHARM REV CODE 250

## 2022-08-11 PROCEDURE — 96367 TX/PROPH/DG ADDL SEQ IV INF: CPT

## 2022-08-11 PROCEDURE — 1159F PR MEDICATION LIST DOCUMENTED IN MEDICAL RECORD: ICD-10-PCS | Mod: CPTII,,, | Performed by: INTERNAL MEDICINE

## 2022-08-11 PROCEDURE — 3008F BODY MASS INDEX DOCD: CPT | Mod: CPTII,,, | Performed by: INTERNAL MEDICINE

## 2022-08-11 PROCEDURE — 1160F RVW MEDS BY RX/DR IN RCRD: CPT | Mod: CPTII,,, | Performed by: INTERNAL MEDICINE

## 2022-08-11 PROCEDURE — 85025 COMPLETE CBC W/AUTO DIFF WBC: CPT | Performed by: INTERNAL MEDICINE

## 2022-08-11 PROCEDURE — 3074F PR MOST RECENT SYSTOLIC BLOOD PRESSURE < 130 MM HG: ICD-10-PCS | Mod: CPTII,,, | Performed by: INTERNAL MEDICINE

## 2022-08-11 PROCEDURE — 99215 OFFICE O/P EST HI 40 MIN: CPT | Mod: S$PBB,,, | Performed by: INTERNAL MEDICINE

## 2022-08-11 PROCEDURE — 3078F DIAST BP <80 MM HG: CPT | Mod: CPTII,,, | Performed by: INTERNAL MEDICINE

## 2022-08-11 PROCEDURE — 99215 PR OFFICE/OUTPT VISIT, EST, LEVL V, 40-54 MIN: ICD-10-PCS | Mod: S$PBB,,, | Performed by: INTERNAL MEDICINE

## 2022-08-11 PROCEDURE — 3078F PR MOST RECENT DIASTOLIC BLOOD PRESSURE < 80 MM HG: ICD-10-PCS | Mod: CPTII,,, | Performed by: INTERNAL MEDICINE

## 2022-08-11 PROCEDURE — 1159F MED LIST DOCD IN RCRD: CPT | Mod: CPTII,,, | Performed by: INTERNAL MEDICINE

## 2022-08-11 PROCEDURE — 84439 ASSAY OF FREE THYROXINE: CPT | Performed by: INTERNAL MEDICINE

## 2022-08-11 PROCEDURE — 3074F SYST BP LT 130 MM HG: CPT | Mod: CPTII,,, | Performed by: INTERNAL MEDICINE

## 2022-08-11 RX ORDER — HEPARIN 100 UNIT/ML
500 SYRINGE INTRAVENOUS
Status: CANCELLED | OUTPATIENT
Start: 2022-09-08

## 2022-08-11 RX ORDER — SODIUM CHLORIDE 0.9 % (FLUSH) 0.9 %
10 SYRINGE (ML) INJECTION
Status: CANCELLED | OUTPATIENT
Start: 2022-08-11

## 2022-08-11 RX ORDER — ZOLEDRONIC ACID 0.04 MG/ML
4 INJECTION, SOLUTION INTRAVENOUS
Status: DISCONTINUED | OUTPATIENT
Start: 2022-08-11 | End: 2022-08-11

## 2022-08-11 RX ORDER — SODIUM CHLORIDE 0.9 % (FLUSH) 0.9 %
10 SYRINGE (ML) INJECTION
Status: CANCELLED | OUTPATIENT
Start: 2022-09-08

## 2022-08-11 RX ORDER — LEVOTHYROXINE SODIUM 200 UG/1
200 TABLET ORAL DAILY
Qty: 30 TABLET | Refills: 11 | Status: SHIPPED | OUTPATIENT
Start: 2022-08-11 | End: 2022-11-20 | Stop reason: SDUPTHER

## 2022-08-11 RX ORDER — HEPARIN 100 UNIT/ML
500 SYRINGE INTRAVENOUS
Status: DISCONTINUED | OUTPATIENT
Start: 2022-08-11 | End: 2022-08-11 | Stop reason: HOSPADM

## 2022-08-11 RX ORDER — ZOLEDRONIC ACID 0.04 MG/ML
4 INJECTION, SOLUTION INTRAVENOUS
Status: CANCELLED | OUTPATIENT
Start: 2022-09-08

## 2022-08-11 RX ORDER — DIPHENHYDRAMINE HYDROCHLORIDE 50 MG/ML
50 INJECTION INTRAMUSCULAR; INTRAVENOUS ONCE AS NEEDED
Status: CANCELLED | OUTPATIENT
Start: 2022-08-11

## 2022-08-11 RX ORDER — LEVOTHYROXINE SODIUM 75 UG/1
75 TABLET ORAL
Qty: 30 TABLET | Refills: 11 | Status: SHIPPED | OUTPATIENT
Start: 2022-08-11 | End: 2022-11-20 | Stop reason: SDUPTHER

## 2022-08-11 RX ORDER — SODIUM CHLORIDE 0.9 % (FLUSH) 0.9 %
10 SYRINGE (ML) INJECTION
Status: DISCONTINUED | OUTPATIENT
Start: 2022-08-11 | End: 2022-08-11 | Stop reason: HOSPADM

## 2022-08-11 RX ORDER — EPINEPHRINE 0.3 MG/.3ML
0.3 INJECTION SUBCUTANEOUS ONCE AS NEEDED
Status: CANCELLED | OUTPATIENT
Start: 2022-08-11

## 2022-08-11 RX ORDER — HEPARIN 100 UNIT/ML
500 SYRINGE INTRAVENOUS
Status: CANCELLED | OUTPATIENT
Start: 2022-08-11

## 2022-08-11 RX ADMIN — SODIUM CHLORIDE 400 MG: 9 INJECTION, SOLUTION INTRAVENOUS at 11:08

## 2022-08-11 RX ADMIN — ZOLEDRONIC ACID 3.5 MG: 4 INJECTION, SOLUTION, CONCENTRATE INTRAVENOUS at 11:08

## 2022-08-11 RX ADMIN — Medication 10 ML: at 12:08

## 2022-08-11 RX ADMIN — SODIUM CHLORIDE: 0.9 INJECTION, SOLUTION INTRAVENOUS at 11:08

## 2022-08-11 RX ADMIN — HEPARIN 500 UNITS: 100 SYRINGE at 12:08

## 2022-08-11 NOTE — PATIENT INSTRUCTIONS
Maintenance pembrolizumab today  Zometa today  Continue ibrance at home  RV in 6 wks with cbc, cmp tsh, ft4 and pembrolizumab planned

## 2022-08-11 NOTE — TELEPHONE ENCOUNTER
Specialty Pharmacy - Refill Coordination    Specialty Medication Orders Linked to Encounter    Flowsheet Row Most Recent Value   Medication #1 letrozole (FEMARA) 2.5 mg Tab (Order#[id number], Rx#[id number])   Medication #2 palbociclib (IBRANCE) 125 mg Cap (Order#[id number], Rx#[id number])      Received new RX for Ibrance. Pt stated she was on chemo and that is why she was not in need of Ibrance. Stressed importance of adherence and refill calls.     Refill Questions - Documented Responses    Flowsheet Row Most Recent Value   Patient Availability and HIPAA Verification    Does patient want to proceed with activity? Yes   HIPAA/medical authority confirmed? Yes   Relationship to patient of person spoken to? Self   Refill Screening Questions    Changes to allergies? No   Changes to medications? No   New conditions since last clinic visit? No   Unplanned office visit, urgent care, ED, or hospital admission in the last 4 weeks? No   How does patient/caregiver feel medication is working? Good   Financial problems or insurance changes? No   How many doses of your specialty medications were missed in the last 4 weeks? 0   Would patient like to speak to a pharmacist? No   When does the patient need to receive the medication? 08/15/22   Refill Delivery Questions    How will the patient receive the medication? Delivery Pauly   When does the patient need to receive the medication? 08/15/22   Shipping Address Home   Address in [facility] confirmed and updated if neccessary? Yes   Expected Copay ($) 0   Is the patient able to afford the medication copay? Yes   Payment Method zero copay   Days supply of Refill 28   Supplies needed? No supplies needed   Refill activity completed? Yes   Refill activity plan Refill scheduled   Shipment/Pickup Date: 08/12/22          Current Outpatient Medications   Medication Sig    albuterol (PROVENTIL) 2.5 mg /3 mL (0.083 %) nebulizer solution Take 3 mLs (2.5 mg total) by nebulization every 4  to 6 hours as needed for Wheezing or Shortness of Breath.    albuterol (PROVENTIL/VENTOLIN HFA) 90 mcg/actuation inhaler Inhale 2 puffs into the lungs every 4 (four) hours as needed for Wheezing or Shortness of Breath.    ALPRAZolam (XANAX) 1 MG tablet Take 0.5-1 tablets (0.5-1 mg total) by mouth 2 (two) times daily as needed for Anxiety.    buPROPion (WELLBUTRIN SR) 150 MG TBSR 12 hr tablet Take 1 tablet (150 mg total) by mouth 2 (two) times daily. Take 1 tablet (150mg) once daily for 1 week then increase to twice daily    calcium carbonate 400 mg calcium (1,000 mg) Chew Take 2,000 mg by mouth once daily.    cyanocobalamin (VITAMIN B-12) 1000 MCG tablet Take 1 tablet (1,000 mcg total) by mouth once daily.    dexAMETHasone (DECADRON) 4 MG Tab Take 2 tablets (8 mg total) by mouth once daily. Take as directed on days 2, 3, and 4 of your chemotherapy cycle.    diphenoxylate-atropine 2.5-0.025 mg (LOMOTIL) 2.5-0.025 mg per tablet Take 1 tablet by mouth 4 (four) times daily as needed for Diarrhea.    ergocalciferol (VITAMIN D2) 50,000 unit Cap Take 5,000 Units by mouth once daily at 6am.     HYDROcodone-acetaminophen (NORCO)  mg per tablet Take 1 tablet by mouth every 4 (four) hours as needed for Pain. No more than 4 doses/ day    hydrOXYzine HCL (ATARAX) 25 MG tablet Take 1 tablet (25 mg total) by mouth 3 (three) times daily as needed for Itching.    ipratropium (ATROVENT) 42 mcg (0.06 %) nasal spray 2 sprays by Nasal route 4 (four) times daily. As needed for rhinitis. Take in evening before bed and in the morning (Patient not taking: Reported on 8/11/2022)    letrozole (FEMARA) 2.5 mg Tab Take 1 tablet (2.5 mg) by mouth once daily.    levothyroxine (SYNTHROID) 200 MCG tablet Take 1 tablet (200 mcg total) by mouth once daily.    levothyroxine (SYNTHROID) 75 MCG tablet Take 1 tablet (75 mcg total) by mouth before breakfast.    morphine (MS CONTIN) 30 MG 12 hr tablet Take 1 tablet (30 mg total) by  mouth every 12 (twelve) hours.    multivitamin (THERAGRAN) per tablet Take 1 tablet by mouth once daily.    nitrofurantoin, macrocrystal-monohydrate, (MACROBID) 100 MG capsule Take 100 mg by mouth 2 (two) times daily.    OLANZapine (ZYPREXA) 5 MG tablet Take 1 tablet (5 mg total) by mouth every evening. Take as directed on days 1-4 of your chemotherapy cycle. May increase to 10 mg if breakthrough nausea occurs. (Patient not taking: Reported on 8/11/2022)    ondansetron (ZOFRAN) 4 MG tablet Take 1 tablet (4 mg total) by mouth every 6 (six) hours as needed for Nausea. (Patient not taking: Reported on 8/11/2022)    ondansetron (ZOFRAN-ODT) 8 MG TbDL Take 1 tablet (8 mg total) by mouth every 8 (eight) hours as needed (nausea/vomiting). (Patient not taking: Reported on 8/11/2022)    palbociclib (IBRANCE) 125 mg Cap Take one capsule (125 mg) by mouth once daily on days 1-21 of each 28-day cycle.    phenazopyridine (PYRIDIUM) 200 MG tablet Take 200 mg by mouth 3 (three) times daily.    potassium chloride SA (K-DUR,KLOR-CON) 20 MEQ tablet Take 1 tablet (20 mEq total) by mouth once daily. Take daily for 3 days.   Last reviewed on 8/11/2022 11:21 AM by Estefani Asencio MD    Review of patient's allergies indicates:   Allergen Reactions    Gabapentin Swelling     Note: unilateral joint edema, unclear if due to gabapentin    Nsaids (non-steroidal anti-inflammatory drug) Other (See Comments)     Instructed not to take NSAID drugs with chemo pill.    Clindamycin Rash    Vancomycin analogues Itching    Last reviewed on  8/11/2022 11:21 AM by Estefani Asencio    Interventions added this encounter   Closed: OSP Patient Assessment: palbociclib (IBRANCE) 125 mg Cap     Tasks added this encounter   9/5/2022 - Refill Call (Auto Added)  9/5/2022 - Refill Call (Auto Added)   Tasks due within next 3 months   11/1/2022 - Clinical - Follow Up Assesement (180 day)     Kyara Jason, PharmD  [facility]  [address] [address]  Phone: [phone]  Fax: [phone]

## 2022-08-11 NOTE — TELEPHONE ENCOUNTER
Specialty Pharmacy - Refill Coordination    Specialty Medication Orders Linked to Encounter    Flowsheet Row Most Recent Value   Medication #1 letrozole (FEMARA) 2.5 mg Tab (Order#[id number], Rx#[id number])   Medication #2 palbociclib (IBRANCE) 125 mg Cap (Order#[id number], Rx#[id number])        Patient Diagnosis   C50.811, Z17.0 - Malignant neoplasm of overlapping sites of right breast in female, estrogen receptor positive  C50.811, Z17.0 - Malignant neoplasm of overlapping sites of right breast in female, estrogen receptor positive    Specialty clinical pharmacist review completed for an annual review of reassessment. Reviewed the following areas: current med list, reports of adverse effects, adherence and progress towards therapeutic goals.    Recommendations: none at this time.    Tasks added this encounter   9/5/2022 - Refill Call (Auto Added)  9/5/2022 - Refill Call (Auto Added)   Tasks due within next 3 months   11/1/2022 - Clinical - Follow Up Assesement (180 day)     Kyara Jason, PharmD  [facility]  [address]  [facility] [address]  Phone: [phone]  Fax: [phone]

## 2022-08-11 NOTE — PROGRESS NOTES
Subjective:      DATE OF VISIT: 8/11/2022   ?   Patient ID:?Josey Flores is a 51 y.o. female.?? MR#: [id number]   ?   PRIMARY PROVIDER: Dr. Asencio  ?   CHIEF COMPLAINT:  Follow-up  ?   ONCOLOGIC DIAGNOSIS:      Stage IV cervical squamous cell carcinoma    stage IV, T2 N1b M1, invasive breast carcinoma, ER/WI+, HER2-    Papillary thyroid carcinoma status post total thyroidectomy on 08/31/2017, mpT1b N0     Cervical HSIL MIREILLE 3 s/p LEEP, 2017  ?   CURRENT TREATMENT:    Letrozole and palbociclib  Carboplatin, paclitaxel and pembrolizumab, C1D1 1/28/22; maintenance pembrolizumab started 6/20/22    PAST TREATMENT:    Palliative chemotherapy: cisplatin, paclitaxel and bevacizumab; 02/22/2021 cycle 1 day 1 -> bevacizumab maintenance after 6 cycles    INTERVAL EVENTS    Couple weeks ago went to emergency room with urinary tract infection and dehydration given antibiotics and IV fluids completed.  She is feeling well today at her baseline.  No recurrent abdominal/pelvic pain or vaginal bleeding.  No fevers or chills.  Eating and drinking well.  Plan for maintenance pembrolizumab today as well as zoledronic acid.  She does take calcium vitamin-D supplementation.  No new bony or other pain.  She did have interval PET scan which we reviewed today.    ROS  A comprehensive 14-point review of systems was reviewed with patient and was negative other than as specified above.   ?     Objective:      Physical Exam        Vitals:    08/11/22 0952   BP: 107/69   Pulse: 98   Temp: 97.8 °F (36.6 °C)      General appearance: Generally well appearing, in no acute distress.   Head, eyes, ears, nose, and throat: Oropharynx clear with moist mucous membranes.   Cardiovascular: Regular rate and rhythm, S1, S2, no audible murmurs.   Respiratory: Lungs clear to auscultation bilaterally.   Abdomen: nontender, nondistended.   Extremities: Warm, without edema.   Neurologic: Alert and oriented.  Skin: No rashes, ecchymoses or petechial lesion.    Psychiatric: normal mood and affect, conversant and appropriate    Laboratory:  ?   Lab Visit on 08/11/2022   Component Date Value Ref Range Status    TSH 08/11/2022 15.988 (A) 0.400 - 4.000 uIU/mL Final    Free T4 08/11/2022 0.77  0.71 - 1.51 ng/dL Final    WBC 08/11/2022 8.80  3.90 - 12.70 K/uL Final    RBC 08/11/2022 3.34 (A) 4.00 - 5.40 M/uL Final    Hemoglobin 08/11/2022 11.2 (A) 12.0 - 16.0 g/dL Final    Hematocrit 08/11/2022 34.7 (A) 37.0 - 48.5 % Final    MCV 08/11/2022 104 (A) 82 - 98 fL Final    MCH 08/11/2022 33.5 (A) 27.0 - 31.0 pg Final    MCHC 08/11/2022 32.3  32.0 - 36.0 g/dL Final    RDW 08/11/2022 16.3 (A) 11.5 - 14.5 % Final    Platelets 08/11/2022 258  150 - 450 K/uL Final    MPV 08/11/2022 10.4  9.2 - 12.9 fL Final    Immature Granulocytes 08/11/2022 1.3 (A) 0.0 - 0.5 % Final    Gran # (ANC) 08/11/2022 4.9  1.8 - 7.7 K/uL Final    Immature Grans (Abs) 08/11/2022 0.11 (A) 0.00 - 0.04 K/uL Final    Lymph # 08/11/2022 2.0  1.0 - 4.8 K/uL Final    Mono # 08/11/2022 0.7  0.3 - 1.0 K/uL Final    Eos # 08/11/2022 1.0 (A) 0.0 - 0.5 K/uL Final    Baso # 08/11/2022 0.05  0.00 - 0.20 K/uL Final    nRBC 08/11/2022 0  0 /100 WBC Final    Gran % 08/11/2022 55.9  38.0 - 73.0 % Final    Lymph % 08/11/2022 22.3  18.0 - 48.0 % Final    Mono % 08/11/2022 8.3  4.0 - 15.0 % Final    Eosinophil % 08/11/2022 11.6 (A) 0.0 - 8.0 % Final    Basophil % 08/11/2022 0.6  0.0 - 1.9 % Final    Differential Method 08/11/2022 Automated   Final    Sodium 08/11/2022 140  136 - 145 mmol/L Final    Potassium 08/11/2022 3.9  3.5 - 5.1 mmol/L Final    Chloride 08/11/2022 103  95 - 110 mmol/L Final    CO2 08/11/2022 26  23 - 29 mmol/L Final    Glucose 08/11/2022 119 (A) 70 - 110 mg/dL Final    BUN 08/11/2022 11  6 - 20 mg/dL Final    Creatinine 08/11/2022 1.3  0.5 - 1.4 mg/dL Final    Calcium 08/11/2022 9.6  8.7 - 10.5 mg/dL Final    Total Protein 08/11/2022 7.0  6.0 - 8.4 g/dL Final    Albumin  08/11/2022 3.8  3.5 - 5.2 g/dL Final    Total Bilirubin 08/11/2022 0.4  0.1 - 1.0 mg/dL Final    Alkaline Phosphatase 08/11/2022 74  55 - 135 U/L Final    AST 08/11/2022 14  10 - 40 U/L Final    ALT 08/11/2022 15  10 - 44 U/L Final    Anion Gap 08/11/2022 11  8 - 16 mmol/L Final    eGFR 08/11/2022 49 (A) >60 mL/min/1.73 m^2 Final      Lab Results   Component Value Date    WBC 8.80 08/11/2022    HGB 11.2 (L) 08/11/2022    HCT 34.7 (L) 08/11/2022     (H) 08/11/2022     08/11/2022         Chemistry        Component Value Date/Time     08/11/2022 0946    K 3.9 08/11/2022 0946     08/11/2022 0946    CO2 26 08/11/2022 0946    BUN 11 08/11/2022 0946    CREATININE 1.3 08/11/2022 0946     (H) 08/11/2022 0946        Component Value Date/Time    CALCIUM 9.6 08/11/2022 0946    ALKPHOS 74 08/11/2022 0946    AST 14 08/11/2022 0946    ALT 15 08/11/2022 0946    BILITOT 0.4 08/11/2022 0946    ESTGFRAFRICA 60 07/23/2022 1922    EGFRNONAA 52 (A) 07/23/2022 1922          ? IMAGING   Results for orders placed or performed during the hospital encounter of 08/05/22 (from the past 2160 hour(s))   NM PET CT Routine FDG    Impression    Interval decrease in soft tissue fullness and FDG avidity in the cervical region consistent with positive treatment response.  No detrimental interval change.      Electronically signed by: INÉS Sam MD  Date:    08/05/2022  Time:    13:51     *Note: Due to a large number of results and/or encounters for the requested time period, some results have not been displayed. A complete set of results can be found in Results Review.     No results found. However, due to the size of the patient record, not all encounters were searched. Please check Results Review for a complete set of results.    PATHOLOGY  2/2021  Station 7 lymph node, fine needle aspiration (8 smears, 1 cell block):       -  Positive for malignant cells, morphologically consistent with   metastatic  squamous cell carcinoma     Assessment/Plan:       1. Malignant neoplasm metastatic to lymph node of axilla    2. Malignant neoplasm of overlapping sites of right breast in female, estrogen receptor positive    3. Secondary cancer of bone    4. Papillary thyroid carcinoma    5. Recurrent cervical cancer    6. Hypothyroidism, unspecified type    7. Urinary tract infection without hematuria, site unspecified          Plan:     # metastatic squamous cell carcinoma of the cervix SCC cervix:  history of HSIL Status post LEEP 2017. In December 2020 follow-up with gynecology with new spotty vaginal bleeding/discharge with biopsy 12/7/20 showing squamous cell carcinoma consistent with cervical primary.  MRI pelvis performed showing locally advanced disease with 4.9 cm cervical mass with right parametrial involvement and enlarged right external iliac lymph node 1.5 x 1.2 cm. PET-CT showing avidity of cervical mass with extension to lower uterine segment, right vaginal focus of avidity and lymphadenopathy including right internal external iliac, periaortic, pericaval.  There is the new hypermetabolic lymphadenopathy in right subcarinal 2.3 cm SUV 5.8. Small 9 mm right paratracheal S base slightly increased no FDG avid SUV 2.4.  01/29/2021 multidisciplinary tumor board discussion of her case with review of imaging; concern for new avid lymphadenopathy particularly in right subcarinal may be most consistent with metastatic cervical squamous cell carcinoma; tumor board recommendation for biopsy to confirm for prognostic information as well as given other concomitant malignancy to exclude alternative histology, although felt less likely metastatic breast given this is been well controlled and primarily bony disease involvement.  We discussed recommendation for systemic chemotherapy given advanced cervical squamous cell carcinoma with cisplatin, paclitaxel and bevacizumab with discontinuation of palbociclib but can continue  aromatase inhibitor; taxane may also be active for her concomitant metastatic breast cancer.   - EBUS with biopsy 2/18/21, station 7 consistent with metastatic squamous cell carcinoma of cervical origin.  - STRATA requested on cervical SCC, ERBB3 <5% felt to be subclonal per report, PIK3CA amplification, BQ9439, KDM6A, FRANK, TMB low, PDL1 high may indicated sensitivity to check point inhibitor in future regimen.  - audiology exam 2/18/21, recommended that she let us know if any perceived change in her hearing throughout treatment and recommend repeat testing throughout to ensure no ototoxicity.  - [facility] germline genetic testing returned negative for mutation, provided to patient.  - cycle 1 day 1 cisplatin paclitaxel and bevacizumab on 02/22/2021  Repeat scans 04/26/2021 showed excellent improvement in thoracic and pelvic lymphadenopathy and primary cervical mass.  She is asymptomatic from this no further bleeding.  Recommended continuation of her current regimen which she is tolerating well with supportive care, IV fluids 3 times weekly per patient preference.  - restaging after cycle 6 she has continued improvement in metastatic cervical squamous cell carcinoma; avidity in left vulvar area diminished associated with known infection.  She has required substantial supportive care on chemotherapy and recommend given sustained improvement on scans continuing and absence of chemotherapy with bevacizumab alone and continue close surveillance.  She would like to continue IV fluids weekly as needed.    We reviewed in detail restaging PET-CT January 2022 notable for uptake in cervical region concerning for oligo progression/recurrence of her disease.  Previously biopsy proven metastatic cervical cancer in lungs without evidence of recurrent mass/lymphadenopathy/avidity in this region or otherwise.    Given prior PDL1 high disease CPS >1 we decided to proceed with chemoimmunotherapy per Keynote 826, carboplatin, paclitaxel  and pembrolizumab.   She completed 6 cycles of carboplatin paclitaxel with pembrolizumab (1 cycle without pembrolizumab from unclear reasons with my colleague) with interval brachytherapy with Dr. Kaur at [facility].    Restaging PET scan with diminished avidity vaginal cuff status post brachytherapy.  No new areas concerning for active disease.  Recommend continuation of maintenance immunotherapy pembrolizumab Q 6 weeks which she is tolerating exceptionally well.  She does have baseline hypothyroidism with some abnormality, see below for dose adjustment.    # Hypothyroidism: Rising TSH and low free T4.  low normal free T4 and elevation in TSH.  Will dose increase by 25 mcg (if at 250 will increase to 275 mcg)    # neuropathy:  Mild, will monitor with re-initiation of chemotherapy per above.    # metastatic breast cancer ER positive HER2 negative:  Had been on systemic therapy with palbociclib and letrozole. While on systemic chemo due to c/f cytopenias/toxicity palbociclib was on hold.  Will be continuing on maintenance pembrolizumab labs without concerning cytopenia, mild anemia.  Recommend restarting palbociclib patient notes having this at home.  Will send script to specialty pharmacy.  Continuing letrozole.      # bony metastatic disease:  on zoledronic acid dosed every 12 weeks. - Last zometa 1/18/22; next planned 4/12/22  DEXA 8/2021 without bone loss   Recent PET scan January 2022 without evidence of new bony metastatic disease.  She feels most comfortable with maintenance q.3 months which is reasonable.  Resolution of ENT/left ear infection.restarted zometa support.   Zometa due 08/11/2022   Continue calcium vitamin-D supplementation.    # history of papillary thyroid carcinoma status post total thyroidectomy on 08/31/2017: s/p total thyroidectomy on levothyroxine.     # tobacco abuse:  Current tobacco use.   I have counseled for at least 3 min on the importance of tobacco cessation  and discussed pharmacologic and therapy options to  aid in cessation.  She is interested in trial of bupropion prescribed to her local pharmacy.    Follow-Up:   Patient Instructions   Maintenance pembrolizumab today  Zometa today  Continue ibrance at home  RV in 6 wks with cbc, cmp tsh, ft4 and pembrolizumab planned

## 2022-08-11 NOTE — PLAN OF CARE
Discussed plan of care with pt. Addressed any and ongoing concerns. Pt denies   Problem: Adult Inpatient Plan of Care  Goal: Plan of Care Review  Outcome: Ongoing, Progressing  Goal: Patient-Specific Goal (Individualized)  Outcome: Ongoing, Progressing  Flowsheets (Taken 8/11/2022 1114)  Anxieties, Fears or Concerns: none at this time  Individualized Care Needs: in recliner with warm blanket, snack provided  Patient-Specific Goals (Include Timeframe): tolerate treatment without adverse reaction  Goal: Absence of Hospital-Acquired Illness or Injury  Outcome: Ongoing, Progressing  Intervention: Identify and Manage Fall Risk  Flowsheets (Taken 8/11/2022 1114)  Safety Promotion/Fall Prevention: in recliner, wheels locked  Intervention: Prevent Infection  Flowsheets (Taken 8/11/2022 1114)  Infection Prevention:   equipment surfaces disinfected   hand hygiene promoted   personal protective equipment utilized  Goal: Optimal Comfort and Wellbeing  Outcome: Ongoing, Progressing  Intervention: Provide Person-Centered Care  Flowsheets (Taken 8/11/2022 1114)  Trust Relationship/Rapport:   care explained   choices provided   emotional support provided   questions answered   empathic listening provided   questions encouraged   reassurance provided   thoughts/feelings acknowledged

## 2022-08-24 ENCOUNTER — PATIENT MESSAGE (OUTPATIENT)
Dept: ADMINISTRATIVE | Facility: HOSPITAL | Age: 52
End: 2022-08-24
Payer: MEDICAID

## 2022-09-01 ENCOUNTER — OFFICE VISIT (OUTPATIENT)
Dept: PALLIATIVE MEDICINE | Facility: CLINIC | Age: 52
End: 2022-09-01
Payer: MEDICAID

## 2022-09-01 ENCOUNTER — OFFICE VISIT (OUTPATIENT)
Dept: OTOLARYNGOLOGY | Facility: CLINIC | Age: 52
End: 2022-09-01
Payer: MEDICAID

## 2022-09-01 VITALS
SYSTOLIC BLOOD PRESSURE: 98 MMHG | RESPIRATION RATE: 18 BRPM | BODY MASS INDEX: 31.09 KG/M2 | WEIGHT: 198.06 LBS | HEIGHT: 67 IN | HEART RATE: 75 BPM | DIASTOLIC BLOOD PRESSURE: 62 MMHG | TEMPERATURE: 98 F

## 2022-09-01 DIAGNOSIS — Z71.89 ACP (ADVANCE CARE PLANNING): ICD-10-CM

## 2022-09-01 DIAGNOSIS — C53.0 MALIGNANT NEOPLASM OF ENDOCERVIX: ICD-10-CM

## 2022-09-01 DIAGNOSIS — C73 PAPILLARY THYROID CARCINOMA: ICD-10-CM

## 2022-09-01 DIAGNOSIS — G89.3 NEOPLASM RELATED PAIN: Primary | ICD-10-CM

## 2022-09-01 DIAGNOSIS — F51.04 PSYCHOPHYSIOLOGICAL INSOMNIA: ICD-10-CM

## 2022-09-01 DIAGNOSIS — C50.919 PRIMARY MALIGNANT NEOPLASM OF BREAST WITH METASTASIS TO OTHER SITE, UNSPECIFIED LATERALITY: ICD-10-CM

## 2022-09-01 DIAGNOSIS — H60.392 OTHER INFECTIVE CHRONIC OTITIS EXTERNA OF LEFT EAR: ICD-10-CM

## 2022-09-01 DIAGNOSIS — H60.42 CHOLESTEATOMA OF EXTERNAL AUDITORY CANAL, LEFT: Primary | ICD-10-CM

## 2022-09-01 DIAGNOSIS — H92.12 DRAINAGE FROM LEFT EAR: ICD-10-CM

## 2022-09-01 PROCEDURE — 99215 OFFICE O/P EST HI 40 MIN: CPT | Mod: PBBFAC,27 | Performed by: PHYSICIAN ASSISTANT

## 2022-09-01 PROCEDURE — 3078F DIAST BP <80 MM HG: CPT | Mod: CPTII,,, | Performed by: PHYSICIAN ASSISTANT

## 2022-09-01 PROCEDURE — 3074F SYST BP LT 130 MM HG: CPT | Mod: CPTII,,, | Performed by: PHYSICIAN ASSISTANT

## 2022-09-01 PROCEDURE — 1159F PR MEDICATION LIST DOCUMENTED IN MEDICAL RECORD: ICD-10-PCS | Mod: CPTII,,, | Performed by: PHYSICIAN ASSISTANT

## 2022-09-01 PROCEDURE — 1159F MED LIST DOCD IN RCRD: CPT | Mod: CPTII,,, | Performed by: PHYSICIAN ASSISTANT

## 2022-09-01 PROCEDURE — 3078F PR MOST RECENT DIASTOLIC BLOOD PRESSURE < 80 MM HG: ICD-10-PCS | Mod: CPTII,,, | Performed by: PHYSICIAN ASSISTANT

## 2022-09-01 PROCEDURE — 1160F PR REVIEW ALL MEDS BY PRESCRIBER/CLIN PHARMACIST DOCUMENTED: ICD-10-PCS | Mod: CPTII,,, | Performed by: PHYSICIAN ASSISTANT

## 2022-09-01 PROCEDURE — 99213 OFFICE O/P EST LOW 20 MIN: CPT | Mod: S$PBB,,, | Performed by: PHYSICIAN ASSISTANT

## 2022-09-01 PROCEDURE — 99999 PR PBB SHADOW E&M-EST. PATIENT-LVL III: ICD-10-PCS | Mod: PBBFAC,,, | Performed by: PHYSICIAN ASSISTANT

## 2022-09-01 PROCEDURE — 99213 OFFICE O/P EST LOW 20 MIN: CPT | Mod: PBBFAC | Performed by: PHYSICIAN ASSISTANT

## 2022-09-01 PROCEDURE — 99215 PR OFFICE/OUTPT VISIT, EST, LEVL V, 40-54 MIN: ICD-10-PCS | Mod: S$PBB,,, | Performed by: PHYSICIAN ASSISTANT

## 2022-09-01 PROCEDURE — 3008F PR BODY MASS INDEX (BMI) DOCUMENTED: ICD-10-PCS | Mod: CPTII,,, | Performed by: PHYSICIAN ASSISTANT

## 2022-09-01 PROCEDURE — 99999 PR PBB SHADOW E&M-EST. PATIENT-LVL III: CPT | Mod: PBBFAC,,, | Performed by: PHYSICIAN ASSISTANT

## 2022-09-01 PROCEDURE — 3008F BODY MASS INDEX DOCD: CPT | Mod: CPTII,,, | Performed by: PHYSICIAN ASSISTANT

## 2022-09-01 PROCEDURE — 99213 PR OFFICE/OUTPT VISIT, EST, LEVL III, 20-29 MIN: ICD-10-PCS | Mod: S$PBB,,, | Performed by: PHYSICIAN ASSISTANT

## 2022-09-01 PROCEDURE — 99999 PR PBB SHADOW E&M-EST. PATIENT-LVL V: ICD-10-PCS | Mod: PBBFAC,,, | Performed by: PHYSICIAN ASSISTANT

## 2022-09-01 PROCEDURE — 3074F PR MOST RECENT SYSTOLIC BLOOD PRESSURE < 130 MM HG: ICD-10-PCS | Mod: CPTII,,, | Performed by: PHYSICIAN ASSISTANT

## 2022-09-01 PROCEDURE — 99999 PR PBB SHADOW E&M-EST. PATIENT-LVL V: CPT | Mod: PBBFAC,,, | Performed by: PHYSICIAN ASSISTANT

## 2022-09-01 PROCEDURE — 1160F RVW MEDS BY RX/DR IN RCRD: CPT | Mod: CPTII,,, | Performed by: PHYSICIAN ASSISTANT

## 2022-09-01 PROCEDURE — 99215 OFFICE O/P EST HI 40 MIN: CPT | Mod: S$PBB,,, | Performed by: PHYSICIAN ASSISTANT

## 2022-09-01 RX ORDER — MORPHINE SULFATE 30 MG/1
30 TABLET, FILM COATED, EXTENDED RELEASE ORAL EVERY 12 HOURS
Qty: 48 TABLET | Refills: 0 | Status: SHIPPED | OUTPATIENT
Start: 2022-09-01 | End: 2022-09-25

## 2022-09-01 RX ORDER — MORPHINE SULFATE 15 MG/1
15 TABLET, FILM COATED, EXTENDED RELEASE ORAL 2 TIMES DAILY
Qty: 48 TABLET | Refills: 0 | Status: SHIPPED | OUTPATIENT
Start: 2022-09-01 | End: 2022-09-25

## 2022-09-01 RX ORDER — NALOXONE HYDROCHLORIDE 4 MG/.1ML
1 SPRAY NASAL ONCE
Qty: 1 EACH | Refills: 0 | Status: SHIPPED | OUTPATIENT
Start: 2022-09-01 | End: 2022-09-01

## 2022-09-01 RX ORDER — OLANZAPINE 5 MG/1
5 TABLET ORAL NIGHTLY
Qty: 30 TABLET | Refills: 2 | Status: SHIPPED | OUTPATIENT
Start: 2022-09-01 | End: 2022-10-03

## 2022-09-01 NOTE — PROGRESS NOTES
Subjective:       Patient ID: Josey Flores is a 51 y.o. female.    Chief Complaint: Otalgia (Left ear )    Josey Flores is a 51 y.o. female here to see me today for left ear pain and drainage.  She has known left EAC cholesteatoma.  Last here for routine ear cleaning in 2/2022.    Reports left ear drainage started about 2 days ago.  She resumed using the ototopical powder previously prescribed.  She has had some throbbing pain in the left ear too; worse when lying down.       Saw Dr. Wu on 12/16/2021.  Discussed options including elective canaloplasty vs tympmastoid.  She elected to continue to observe given her ongoing cancer treatment.    Denies any recent changes in her hearing.  She has occasional tinnitus AU.        Hx of BRCA and chemo in the past, no XRT.  She restarted chemotherapy at time of her last visit (2/2022).       Review of Systems   Constitutional: Negative.    HENT:  Positive for ear discharge and ear pain.    Eyes: Negative.    Respiratory: Negative.     Gastrointestinal: Negative.    Endocrine: Positive for cold intolerance.   Genitourinary: Negative.    Musculoskeletal:  Positive for back pain.   Integumentary:  Negative.   Allergic/Immunologic: Negative.    Neurological:  Positive for dizziness and headaches.   Hematological: Negative.    Psychiatric/Behavioral:  Positive for decreased concentration and sleep disturbance. The patient is nervous/anxious.        Objective:      Physical Exam  Constitutional:       Appearance: Normal appearance.   HENT:      Head: Normocephalic and atraumatic.      Comments: AS: significant for copious cerumen with underlying purulent material on the left.  She has erosion of her inferior left external auditory canal (canal cholesteatoma) with inflammation present.       Right Ear: Tympanic membrane, ear canal and external ear normal. There is no impacted cerumen (not occlusive).      Left Ear: External ear normal.   Neurological:      Mental  Status: She is alert.       Assessment:       Problem List Items Addressed This Visit    None  Visit Diagnoses       Cholesteatoma of external auditory canal, left    -  Primary    Other infective chronic otitis externa of left ear        Drainage from left ear                  Plan:       She has left EAC cholesteatoma; saw Dr. Wu and elected to continue observation instead of surgery (elective canaloplasty vs tympmastoid) given her breast cancer treatment.  She had large amount of cerumen removed from her left ear today with purulence beneath it.  I recommend she continue using her ototopical powder over next 7 days and will recheck in 2 weeks.  She should follow dry ear precautions and avoid Qtips.   Instructed her to call sooner with any worsening ear pain or drainage.    Answers submitted by the patient for this visit:  Review of Symptoms Questionnaire  (Submitted on 8/30/2022)  Ear infection(s)?: Yes  Foot swelling?: Yes  Muscle aches / pain?: Yes  Feeling depressed?: Yes

## 2022-09-01 NOTE — ASSESSMENT & PLAN NOTE
Restaging PET scan with diminished avidity vaginal cuff status post brachytherapy    Will see Dr. Kaur in a couple weeks    Bevacizumab maintenance

## 2022-09-01 NOTE — PROGRESS NOTES
"Palliative Medicine         Follow up    SUBJECTIVE:   Chief complaint: pain follow up    09/01/2022   Ms. Flores comes to clinic today with her fiance Alessio. She reports that her lower abdominal pain has worsened since brachytherapy started several weeks ago. She reports spontaneous lower abdominal pain not related to food but sometimes exacerbated by BM. She denies constipation. She has taken an extra dose of MSER at bedtime to help with sleep. We talked about the risks associated with this and would advise against doing this again. She is taking hydrocodone q4hr sometimes waking up at night and needing another dose averaging 4-6 in a day. I recommend we increase the long acting dose in hopes for better control and less PRN usage. She says her sleep is poor because this is when the pain intensifies (lying flat) but also anxiety and sadness since her granddaughter moved out of the home. She is taking Xanax HS which helps. She was taking Wellbutrin in the last but not sure why she stopped. For now lets not resume this and evaluate at her next visit.    Past visits:  06/16/2022: Patient doing okay.  No new issues since her last visit.  She continue to have some lower GI pains describes now as "cramping after a BM".  Previously, her discomfort was after eating.  She has been referred to GI.  She has some fatigue but managing.  Pain is controlled on current regimen.  She has upcoming scans and appointment with Dr. Asencio.  She has some anxiety and life stressors affecting her situation.  Her son and his ex-girfriend (now in [place]) can no longer care for their 4 year old daughter.  She and her partner are assuming the parental rights of her granddaughter.  They seem to be coping as well as can be expected.  Overall, she is doing okay.  We will continue to follow at quarterly intervals.  She knows to reach out if her situation changes or symptom burden worsens.    03/10/2022: Overall, patient seems to be doing okay.  She has " some new GI issues with abdominal pain after eating and intermittent diarrhea not felt to be related to her disease or treatment.  Oncology discussed GI referral and she is considering this if no improvement.  We are adjusting her pain medication regimen today mostly to cut back on her prn Norco which she continues to use consistently at 5-6 tablets/day.  She knows she can reach out for additional questions or concerns.     01/06/2022: Patient is a 51 y.o. year old female presenting with for palliative follow up for her metastatic breast cancer, SCC of the cervix. Treatments currently on hold due to recurrent ear infection. She is anxious about results of her recent scan and fearful of disease progression while off treatment.  Otherwise, her pain continues to be controlled on her current regimen.  No new symptoms or complaints today.    ONCOLOGY DIAGNOSES:  Stage IV cervical squamous cell carcinoma   stage IV, T2 N1b M1, invasive breast carcinoma, ER/WA+, HER2-   Papillary thyroid carcinoma status post total thyroidectomy on 08/31/2017, mpT1b N0    Cervical HSIL MIREILLE 3 s/p LEEP, 2017    Patient looks and feels better compared to last visit.  She walked into her appt.  Ear is better and she is being followed by ENT.  She has resumed treatment for both her cervical and breast cancer and is followed closely by medical oncology and gyn oncology.  Pain continues to be controlled on current medication regimen.  She is having knee issues with swelling and likely OA.  We discussed referral to orthopedics.  She also plans to follow up with neurosurgery.  Overall, no major changes and no new issues.     LA  reviewed and summarized:      OBJECTIVE:     ROS:  Review of Systems   Constitutional:  Positive for fatigue. Negative for activity change, appetite change, chills and fever.   HENT:  Negative for mouth sores, sore throat and trouble swallowing.    Respiratory:  Negative for cough and shortness of breath.     Gastrointestinal:  Positive for abdominal pain. Negative for abdominal distention, constipation, diarrhea, nausea and vomiting.   Genitourinary:  Negative for difficulty urinating and dysuria.   Musculoskeletal:  Positive for back pain. Negative for myalgias.   Skin:  Negative for rash and wound.   Allergic/Immunologic: Negative for immunocompromised state.   Neurological:  Negative for weakness and headaches.   Psychiatric/Behavioral:  Positive for sleep disturbance. Negative for confusion, decreased concentration and dysphoric mood. The patient is nervous/anxious.      Review of Symptoms      Symptom Assessment (ESAS 0-10 Scale)  Pain:  8  Dyspnea:  0  Anxiety:  5  Nausea:  0  Depression:  9  Anorexia:  0  Fatigue:  4  Insomnia:  10  Restlessness:  10  Agitation:  10     CAM / Delirium:  Negative  Constipation:  Negative  Diarrhea:  Negative    Anxiety:  Is nervous/anxious  Constipation:  No constipation    Bowel Management Plan (BMP):  Yes      Pain Assessment:    Location(s): abdomen    Abdomen       Location: generalized        Quality: None        Quantity: 8/10 in intensity        Chronicity: Onset 0 year(s) ago, gradually worsening        Aggravating Factors: Bowel movement and recumbency        Alleviating Factors: Opiates        Associated Symptoms: None    ECOG Performance Status stGstrstastdstest:st st1st Living Arrangements:  Lives with spouse and Lives in home    Psychosocial/Cultural: Lives with her significant other of 16 years: they have 3 children combined, 2 sons from previous marriage. Granddaughter recently moved out of the home    Advance Care Planning   Advance Directives:   Living Will: Yes        Copy on chart: Yes    LaPOST: Yes    Do Not Resuscitate Status: Yes    Agent's Name:  David Acevedo, sister, [phone]    Decision Making:  Patient answered questions        Physical Exam:  Vitals: Temp: 98.1 °F (36.7 °C) (09/01/22 1504)  Pulse: 75 (09/01/22 1504)  Resp: 18 (09/01/22 1504)  BP: 98/62  (09/01/22 1504)  Physical Exam  Vitals and nursing note reviewed.   Constitutional:       General: She is not in acute distress.     Appearance: Normal appearance. She is obese. She is ill-appearing (chronic).   HENT:      Head: Normocephalic and atraumatic.   Eyes:      Pupils: Pupils are equal, round, and reactive to light.   Cardiovascular:      Rate and Rhythm: Normal rate and regular rhythm.      Pulses: Normal pulses.      Heart sounds: Normal heart sounds. No murmur heard.  Pulmonary:      Effort: Pulmonary effort is normal. No respiratory distress.      Breath sounds: Normal breath sounds.   Abdominal:      General: Bowel sounds are normal. There is no distension.      Palpations: Abdomen is soft.      Tenderness: There is no abdominal tenderness.   Musculoskeletal:         General: Normal range of motion.      Cervical back: Normal range of motion.      Right lower leg: No edema.      Left lower leg: No edema.   Skin:     General: Skin is warm and dry.   Neurological:      Mental Status: She is alert and oriented to person, place, and time. Mental status is at baseline.      Cranial Nerves: No cranial nerve deficit.   Psychiatric:         Mood and Affect: Mood normal.         Behavior: Behavior normal.         Thought Content: Thought content normal.         Judgment: Judgment normal.       Radiology and laboratory data reviewed    ASSESSMENT/PLAN:     Problem List Items Addressed This Visit          Oncology    Papillary thyroid carcinoma    Current Assessment & Plan     s/p total thyroidectomy on levothyroxine            Neoplasm related pain - Primary    Overview     Increase to MSEr 45mg BID (15mg +30mg)    Continue hydrocodone 10mg QID PRN    Pain contract- 9/1/22  UDS collected- next visit, urinated before visit  Narcan rx- 9/1/22           Current Assessment & Plan     3 week script sent today. Next refill all controlled meds should be ready so we can get them on the same cycle         Malignant  neoplasm of endocervix    Current Assessment & Plan     Restaging PET scan with diminished avidity vaginal cuff status post brachytherapy    Will see Dr. Kaur in a couple weeks    Bevacizumab maintenance              Primary breast cancer with metastasis to other site    Current Assessment & Plan     On letrozole and has recently restarted palbociclib - likely contributing to worsening nausea                     Palliative Care    ACP (advance care planning)    Overview     DNR; LaPOST completed and uploaded in EMR    HCPOA:  David Acevedo, sister, at [phone]              Other    Psychophysiological insomnia    Overview     Would like to resume Zyprexa due to worsening nausea and insomnia lately    Xanax also helps calm her nerves and sleep                Follow up: 1 month    45 minutes of total time spent on the encounter, which includes face to face time and non-face to face time preparing to see the patient (eg, review of tests), Obtaining and/or reviewing separately obtained history, Documenting clinical information in the electronic or other health record, Independently interpreting results (not separately reported) and communicating results to the patient/family/caregiver, or Care coordination (not separately reported).      Signature: Kyara Jones PA-C

## 2022-09-01 NOTE — ASSESSMENT & PLAN NOTE
On letrozole and has recently restarted palbociclib - likely contributing to worsening nausea

## 2022-09-01 NOTE — ASSESSMENT & PLAN NOTE
3 week script sent today. Next refill all controlled meds should be ready so we can get them on the same cycle

## 2022-09-07 ENCOUNTER — SPECIALTY PHARMACY (OUTPATIENT)
Dept: PHARMACY | Facility: CLINIC | Age: 52
End: 2022-09-07
Payer: MEDICAID

## 2022-09-07 DIAGNOSIS — Z17.0 MALIGNANT NEOPLASM OF OVERLAPPING SITES OF RIGHT BREAST IN FEMALE, ESTROGEN RECEPTOR POSITIVE: Primary | ICD-10-CM

## 2022-09-07 DIAGNOSIS — C50.811 MALIGNANT NEOPLASM OF OVERLAPPING SITES OF RIGHT BREAST IN FEMALE, ESTROGEN RECEPTOR POSITIVE: Primary | ICD-10-CM

## 2022-09-21 ENCOUNTER — TELEPHONE (OUTPATIENT)
Dept: INFUSION THERAPY | Facility: HOSPITAL | Age: 52
End: 2022-09-21
Payer: MEDICAID

## 2022-09-23 ENCOUNTER — OFFICE VISIT (OUTPATIENT)
Dept: OTOLARYNGOLOGY | Facility: CLINIC | Age: 52
End: 2022-09-23
Payer: MEDICAID

## 2022-09-23 VITALS — TEMPERATURE: 98 F | WEIGHT: 196.63 LBS | BODY MASS INDEX: 30.8 KG/M2

## 2022-09-23 DIAGNOSIS — H92.02 OTALGIA OF LEFT EAR: ICD-10-CM

## 2022-09-23 DIAGNOSIS — H60.42 CHOLESTEATOMA OF EXTERNAL AUDITORY CANAL, LEFT: Primary | ICD-10-CM

## 2022-09-23 PROCEDURE — 99999 PR PBB SHADOW E&M-EST. PATIENT-LVL III: CPT | Mod: PBBFAC,,, | Performed by: PHYSICIAN ASSISTANT

## 2022-09-23 PROCEDURE — 99213 OFFICE O/P EST LOW 20 MIN: CPT | Mod: S$PBB,,, | Performed by: PHYSICIAN ASSISTANT

## 2022-09-23 PROCEDURE — 3008F PR BODY MASS INDEX (BMI) DOCUMENTED: ICD-10-PCS | Mod: CPTII,,, | Performed by: PHYSICIAN ASSISTANT

## 2022-09-23 PROCEDURE — 1159F PR MEDICATION LIST DOCUMENTED IN MEDICAL RECORD: ICD-10-PCS | Mod: CPTII,,, | Performed by: PHYSICIAN ASSISTANT

## 2022-09-23 PROCEDURE — 99213 OFFICE O/P EST LOW 20 MIN: CPT | Mod: PBBFAC | Performed by: PHYSICIAN ASSISTANT

## 2022-09-23 PROCEDURE — 99999 PR PBB SHADOW E&M-EST. PATIENT-LVL III: ICD-10-PCS | Mod: PBBFAC,,, | Performed by: PHYSICIAN ASSISTANT

## 2022-09-23 PROCEDURE — 99213 PR OFFICE/OUTPT VISIT, EST, LEVL III, 20-29 MIN: ICD-10-PCS | Mod: S$PBB,,, | Performed by: PHYSICIAN ASSISTANT

## 2022-09-23 PROCEDURE — 1159F MED LIST DOCD IN RCRD: CPT | Mod: CPTII,,, | Performed by: PHYSICIAN ASSISTANT

## 2022-09-23 PROCEDURE — 3008F BODY MASS INDEX DOCD: CPT | Mod: CPTII,,, | Performed by: PHYSICIAN ASSISTANT

## 2022-09-23 NOTE — PROGRESS NOTES
Subjective:       Patient ID: Josey Flores is a 51 y.o. female.    Chief Complaint: left ear pain (Pt states ear got better but now getting back to where started from. C/o throbbing pain , cant sleep.)    Josey Flores is a 51 y.o. female here to see me today for recheck of her left ear.  She was last here with me on 9/1/22 with left ear pain and drainage.  She has known left EAC cholesteatoma.  She resumed use of her ototopical powder and her ear drainage and pain resolved.  Last night she started with throbbing left ear pain, worse if she opens her mouth wide or lies down on that ear.  Has not had any further ear drainage.  No fever.    Saw Dr. Wu on 12/16/2021.  Discussed options including elective canaloplasty vs tympmastoid.  She elected to continue to observe given her ongoing cancer treatment (Hx orf BRCA).    Denies any recent changes in her hearing.  She has occasional tinnitus AU.            Review of Systems   Constitutional: Negative.  Negative for fever.   HENT:  Positive for ear pain (left). Negative for ear discharge and hearing loss (denies recent changes).    Eyes: Negative.    Respiratory: Negative.     Gastrointestinal: Negative.    Endocrine: Positive for cold intolerance and heat intolerance.   Genitourinary: Negative.    Musculoskeletal:  Positive for back pain.   Integumentary:  Negative.   Allergic/Immunologic: Negative.    Neurological:  Positive for headaches.   Hematological: Negative.    Psychiatric/Behavioral:  Positive for decreased concentration and sleep disturbance. The patient is nervous/anxious.        Objective:      Physical Exam  Constitutional:       Appearance: Normal appearance.   HENT:      Head: Normocephalic and atraumatic.      Comments: AS: ototopical powder suctioned from ear; dry with no purulent drainage on exam today.  Erosion of her inferior left external auditory canal (canal cholesteatoma) with no inflammation or drainage present today.       Right  Ear: Tympanic membrane, ear canal and external ear normal. There is no impacted cerumen (not occlusive).      Left Ear: External ear normal.      Mouth/Throat:      Mouth: Mucous membranes are moist.      Comments: Edentulous, not wearing dentures  Neurological:      Mental Status: She is alert.       Assessment:       Problem List Items Addressed This Visit          ENT    Otalgia of left ear     Other Visit Diagnoses       Cholesteatoma of external auditory canal, left    -  Primary              Plan:         Exam today is actually improved from 9/1/22.  Left ear is dry with no drainage.  I recommend she stop ototopical powder.  She has known left EAC cholesteatoma; saw Dr. Wu and elected to continue observation instead of surgery (elective canaloplasty vs tympmastoid) given her breast cancer treatment.  She should follow dry ear precautions and avoid Qtips.   Instructed her to call with any worsening ear pain or recurrent drainage and would likely proceed with repeat CT Temporal bones to assess for any progression as her last one was in 10/2021 .  She voiced understanding.    Answers submitted by the patient for this visit:  Review of Symptoms Questionnaire  (Submitted on 9/16/2022)  Foot swelling?: Yes  Muscle aches / pain?: Yes  Feeling depressed?: Yes

## 2022-09-28 ENCOUNTER — HOSPITAL ENCOUNTER (OUTPATIENT)
Dept: RADIOLOGY | Facility: HOSPITAL | Age: 52
Discharge: HOME | End: 2022-09-28
Attending: FAMILY MEDICINE
Payer: MEDICAID

## 2022-09-28 ENCOUNTER — PATIENT MESSAGE (OUTPATIENT)
Dept: OTOLARYNGOLOGY | Facility: CLINIC | Age: 52
End: 2022-09-28
Payer: MEDICAID

## 2022-09-28 DIAGNOSIS — Z12.31 ENCOUNTER FOR SCREENING MAMMOGRAM FOR MALIGNANT NEOPLASM OF BREAST: ICD-10-CM

## 2022-09-28 PROCEDURE — 77067 MAMMO DIGITAL SCREENING BILAT WITH TOMO: ICD-10-PCS | Mod: 26,,, | Performed by: RADIOLOGY

## 2022-09-28 PROCEDURE — 77063 BREAST TOMOSYNTHESIS BI: CPT | Mod: TC

## 2022-09-28 PROCEDURE — 77067 SCR MAMMO BI INCL CAD: CPT | Mod: TC

## 2022-09-28 PROCEDURE — 77067 SCR MAMMO BI INCL CAD: CPT | Mod: 26,,, | Performed by: RADIOLOGY

## 2022-09-28 PROCEDURE — 77063 MAMMO DIGITAL SCREENING BILAT WITH TOMO: ICD-10-PCS | Mod: 26,,, | Performed by: RADIOLOGY

## 2022-09-28 PROCEDURE — 77063 BREAST TOMOSYNTHESIS BI: CPT | Mod: 26,,, | Performed by: RADIOLOGY

## 2022-10-03 ENCOUNTER — LAB VISIT (OUTPATIENT)
Dept: LAB | Facility: HOSPITAL | Age: 52
End: 2022-10-03
Attending: INTERNAL MEDICINE
Payer: MEDICAID

## 2022-10-03 ENCOUNTER — PATIENT MESSAGE (OUTPATIENT)
Dept: PHARMACY | Facility: CLINIC | Age: 52
End: 2022-10-03
Payer: MEDICAID

## 2022-10-03 ENCOUNTER — OFFICE VISIT (OUTPATIENT)
Dept: HEMATOLOGY/ONCOLOGY | Facility: CLINIC | Age: 52
End: 2022-10-03
Payer: MEDICAID

## 2022-10-03 VITALS
WEIGHT: 201.25 LBS | SYSTOLIC BLOOD PRESSURE: 103 MMHG | HEIGHT: 67 IN | BODY MASS INDEX: 31.59 KG/M2 | HEART RATE: 77 BPM | OXYGEN SATURATION: 100 % | RESPIRATION RATE: 16 BRPM | TEMPERATURE: 97 F | DIASTOLIC BLOOD PRESSURE: 64 MMHG

## 2022-10-03 DIAGNOSIS — C53.9 RECURRENT CERVICAL CANCER: ICD-10-CM

## 2022-10-03 DIAGNOSIS — C79.51 SECONDARY CANCER OF BONE: ICD-10-CM

## 2022-10-03 DIAGNOSIS — E03.9 HYPOTHYROIDISM, UNSPECIFIED TYPE: ICD-10-CM

## 2022-10-03 DIAGNOSIS — C73 PAPILLARY THYROID CARCINOMA: ICD-10-CM

## 2022-10-03 DIAGNOSIS — Z17.0 MALIGNANT NEOPLASM OF OVERLAPPING SITES OF RIGHT BREAST IN FEMALE, ESTROGEN RECEPTOR POSITIVE: ICD-10-CM

## 2022-10-03 DIAGNOSIS — C77.3 MALIGNANT NEOPLASM METASTATIC TO LYMPH NODE OF AXILLA: Primary | ICD-10-CM

## 2022-10-03 DIAGNOSIS — C53.0 MALIGNANT NEOPLASM OF ENDOCERVIX: ICD-10-CM

## 2022-10-03 DIAGNOSIS — C50.811 MALIGNANT NEOPLASM OF OVERLAPPING SITES OF RIGHT BREAST IN FEMALE, ESTROGEN RECEPTOR POSITIVE: ICD-10-CM

## 2022-10-03 LAB
ALBUMIN SERPL BCP-MCNC: 3.8 G/DL (ref 3.5–5.2)
ALP SERPL-CCNC: 52 U/L (ref 55–135)
ALT SERPL W/O P-5'-P-CCNC: 5 U/L (ref 10–44)
ANION GAP SERPL CALC-SCNC: 12 MMOL/L (ref 8–16)
AST SERPL-CCNC: 10 U/L (ref 10–40)
BASOPHILS # BLD AUTO: 0.08 K/UL (ref 0–0.2)
BASOPHILS NFR BLD: 1.5 % (ref 0–1.9)
BILIRUB SERPL-MCNC: 0.4 MG/DL (ref 0.1–1)
BUN SERPL-MCNC: 11 MG/DL (ref 6–20)
CALCIUM SERPL-MCNC: 9.2 MG/DL (ref 8.7–10.5)
CHLORIDE SERPL-SCNC: 104 MMOL/L (ref 95–110)
CO2 SERPL-SCNC: 25 MMOL/L (ref 23–29)
CREAT SERPL-MCNC: 1.5 MG/DL (ref 0.5–1.4)
DIFFERENTIAL METHOD: ABNORMAL
EOSINOPHIL # BLD AUTO: 0.1 K/UL (ref 0–0.5)
EOSINOPHIL NFR BLD: 1.5 % (ref 0–8)
ERYTHROCYTE [DISTWIDTH] IN BLOOD BY AUTOMATED COUNT: 19.7 % (ref 11.5–14.5)
EST. GFR  (NO RACE VARIABLE): 41 ML/MIN/1.73 M^2
GLUCOSE SERPL-MCNC: 113 MG/DL (ref 70–110)
HCT VFR BLD AUTO: 33.7 % (ref 37–48.5)
HGB BLD-MCNC: 11.3 G/DL (ref 12–16)
IMM GRANULOCYTES # BLD AUTO: 0.06 K/UL (ref 0–0.04)
IMM GRANULOCYTES NFR BLD AUTO: 1.1 % (ref 0–0.5)
LYMPHOCYTES # BLD AUTO: 1.9 K/UL (ref 1–4.8)
LYMPHOCYTES NFR BLD: 34.5 % (ref 18–48)
MCH RBC QN AUTO: 36.1 PG (ref 27–31)
MCHC RBC AUTO-ENTMCNC: 33.5 G/DL (ref 32–36)
MCV RBC AUTO: 108 FL (ref 82–98)
MONOCYTES # BLD AUTO: 0.5 K/UL (ref 0.3–1)
MONOCYTES NFR BLD: 9.6 % (ref 4–15)
NEUTROPHILS # BLD AUTO: 2.8 K/UL (ref 1.8–7.7)
NEUTROPHILS NFR BLD: 51.8 % (ref 38–73)
NRBC BLD-RTO: 0 /100 WBC
PLATELET # BLD AUTO: 187 K/UL (ref 150–450)
PMV BLD AUTO: 11 FL (ref 9.2–12.9)
POTASSIUM SERPL-SCNC: 4 MMOL/L (ref 3.5–5.1)
PROT SERPL-MCNC: 6.8 G/DL (ref 6–8.4)
RBC # BLD AUTO: 3.13 M/UL (ref 4–5.4)
SODIUM SERPL-SCNC: 141 MMOL/L (ref 136–145)
T4 FREE SERPL-MCNC: 0.75 NG/DL (ref 0.71–1.51)
TSH SERPL DL<=0.005 MIU/L-ACNC: 22.16 UIU/ML (ref 0.4–4)
WBC # BLD AUTO: 5.39 K/UL (ref 3.9–12.7)

## 2022-10-03 PROCEDURE — 3008F PR BODY MASS INDEX (BMI) DOCUMENTED: ICD-10-PCS | Mod: CPTII,,, | Performed by: INTERNAL MEDICINE

## 2022-10-03 PROCEDURE — 99215 OFFICE O/P EST HI 40 MIN: CPT | Mod: S$PBB,,, | Performed by: INTERNAL MEDICINE

## 2022-10-03 PROCEDURE — 3078F DIAST BP <80 MM HG: CPT | Mod: CPTII,,, | Performed by: INTERNAL MEDICINE

## 2022-10-03 PROCEDURE — 80053 COMPREHEN METABOLIC PANEL: CPT | Performed by: INTERNAL MEDICINE

## 2022-10-03 PROCEDURE — 99215 PR OFFICE/OUTPT VISIT, EST, LEVL V, 40-54 MIN: ICD-10-PCS | Mod: S$PBB,,, | Performed by: INTERNAL MEDICINE

## 2022-10-03 PROCEDURE — 1160F RVW MEDS BY RX/DR IN RCRD: CPT | Mod: CPTII,,, | Performed by: INTERNAL MEDICINE

## 2022-10-03 PROCEDURE — 84439 ASSAY OF FREE THYROXINE: CPT | Performed by: INTERNAL MEDICINE

## 2022-10-03 PROCEDURE — 99214 OFFICE O/P EST MOD 30 MIN: CPT | Mod: PBBFAC | Performed by: INTERNAL MEDICINE

## 2022-10-03 PROCEDURE — 99999 PR PBB SHADOW E&M-EST. PATIENT-LVL IV: ICD-10-PCS | Mod: PBBFAC,,, | Performed by: INTERNAL MEDICINE

## 2022-10-03 PROCEDURE — 3074F PR MOST RECENT SYSTOLIC BLOOD PRESSURE < 130 MM HG: ICD-10-PCS | Mod: CPTII,,, | Performed by: INTERNAL MEDICINE

## 2022-10-03 PROCEDURE — 1160F PR REVIEW ALL MEDS BY PRESCRIBER/CLIN PHARMACIST DOCUMENTED: ICD-10-PCS | Mod: CPTII,,, | Performed by: INTERNAL MEDICINE

## 2022-10-03 PROCEDURE — 85025 COMPLETE CBC W/AUTO DIFF WBC: CPT | Performed by: INTERNAL MEDICINE

## 2022-10-03 PROCEDURE — 99999 PR PBB SHADOW E&M-EST. PATIENT-LVL IV: CPT | Mod: PBBFAC,,, | Performed by: INTERNAL MEDICINE

## 2022-10-03 PROCEDURE — 84443 ASSAY THYROID STIM HORMONE: CPT | Performed by: INTERNAL MEDICINE

## 2022-10-03 PROCEDURE — 36415 COLL VENOUS BLD VENIPUNCTURE: CPT | Performed by: INTERNAL MEDICINE

## 2022-10-03 PROCEDURE — 1159F MED LIST DOCD IN RCRD: CPT | Mod: CPTII,,, | Performed by: INTERNAL MEDICINE

## 2022-10-03 PROCEDURE — 1159F PR MEDICATION LIST DOCUMENTED IN MEDICAL RECORD: ICD-10-PCS | Mod: CPTII,,, | Performed by: INTERNAL MEDICINE

## 2022-10-03 PROCEDURE — 3074F SYST BP LT 130 MM HG: CPT | Mod: CPTII,,, | Performed by: INTERNAL MEDICINE

## 2022-10-03 PROCEDURE — 3078F PR MOST RECENT DIASTOLIC BLOOD PRESSURE < 80 MM HG: ICD-10-PCS | Mod: CPTII,,, | Performed by: INTERNAL MEDICINE

## 2022-10-03 PROCEDURE — 3008F BODY MASS INDEX DOCD: CPT | Mod: CPTII,,, | Performed by: INTERNAL MEDICINE

## 2022-10-03 RX ORDER — NALOXONE HYDROCHLORIDE 4 MG/.1ML
SPRAY NASAL
COMMUNITY
Start: 2022-09-01

## 2022-10-03 NOTE — PROGRESS NOTES
Subjective:      DATE OF VISIT: 10/3/2022   ?   Patient ID:?Josey Flores is a 51 y.o. female.?? MR#: [id number]   ?   PRIMARY PROVIDER: Dr. Asencio  ?   CHIEF COMPLAINT:  Follow-up  ?   ONCOLOGIC DIAGNOSIS:      Stage IV cervical squamous cell carcinoma    stage IV, T2 N1b M1, invasive breast carcinoma, ER/OR+, HER2-    Papillary thyroid carcinoma status post total thyroidectomy on 08/31/2017, mpT1b N0     Cervical HSIL MIREILLE 3 s/p LEEP, 2017  ?   CURRENT TREATMENT:    Letrozole and palbociclib    PAST TREATMENT:    Palliative chemotherapy: cisplatin, paclitaxel and bevacizumab; 02/22/2021 cycle 1 day 1 -> bevacizumab maintenance after 6 cycles  Carboplatin, paclitaxel and pembrolizumab, C1D1 1/28/22; maintenance pembrolizumab, d/c 9/2022    INTERVAL EVENTS    She has had recurrent pain left ear/jaw with ENT follow-up and  repeat neck CT scan pending.  Otherwise she is doing very well staying well hydrated eating and drinking well no new pain otherwise.    ROS  A comprehensive 14-point review of systems was reviewed with patient and was negative other than as specified above.   ?     Objective:      Physical Exam        Vitals:    10/03/22 0737   BP: 103/64   Pulse: 77   Resp: 16   Temp: 97.4 °F (36.3 °C)      ECOG:?0   General appearance: Generally well appearing, in no acute distress.   Head, eyes, ears, nose, and throat: moist mucous membranes.   Respiratory:  Normal work of breathing  Abdomen: nontender, nondistended.   Extremities: Warm, without edema.   Neurologic: Alert and oriented. Grossly normal strength, coordination, and gait.   Skin: No rashes, ecchymoses or petechial lesion.   Psychiatric:  Normal mood and affect.      Laboratory:  ?   Lab Visit on 10/03/2022   Component Date Value Ref Range Status    WBC 10/03/2022 5.39  3.90 - 12.70 K/uL Final    RBC 10/03/2022 3.13 (L)  4.00 - 5.40 M/uL Final    Hemoglobin 10/03/2022 11.3 (L)  12.0 - 16.0 g/dL Final    Hematocrit 10/03/2022 33.7 (L)  37.0 - 48.5 %  Final    MCV 10/03/2022 108 (H)  82 - 98 fL Final    MCH 10/03/2022 36.1 (H)  27.0 - 31.0 pg Final    MCHC 10/03/2022 33.5  32.0 - 36.0 g/dL Final    RDW 10/03/2022 19.7 (H)  11.5 - 14.5 % Final    Platelets 10/03/2022 187  150 - 450 K/uL Final    MPV 10/03/2022 11.0  9.2 - 12.9 fL Final    Sodium 10/03/2022 141  136 - 145 mmol/L Final    Potassium 10/03/2022 4.0  3.5 - 5.1 mmol/L Final    Chloride 10/03/2022 104  95 - 110 mmol/L Final    CO2 10/03/2022 25  23 - 29 mmol/L Final    Glucose 10/03/2022 113 (H)  70 - 110 mg/dL Final    BUN 10/03/2022 11  6 - 20 mg/dL Final    Creatinine 10/03/2022 1.5 (H)  0.5 - 1.4 mg/dL Final    Calcium 10/03/2022 9.2  8.7 - 10.5 mg/dL Final    Total Protein 10/03/2022 6.8  6.0 - 8.4 g/dL Final    Albumin 10/03/2022 3.8  3.5 - 5.2 g/dL Final    Total Bilirubin 10/03/2022 0.4  0.1 - 1.0 mg/dL Final    Alkaline Phosphatase 10/03/2022 52 (L)  55 - 135 U/L Final    AST 10/03/2022 10  10 - 40 U/L Final    ALT 10/03/2022 5 (L)  10 - 44 U/L Final    Anion Gap 10/03/2022 12  8 - 16 mmol/L Final    eGFR 10/03/2022 41 (A)  >60 mL/min/1.73 m^2 Final      Lab Results   Component Value Date    WBC 5.39 10/03/2022    HGB 11.3 (L) 10/03/2022    HCT 33.7 (L) 10/03/2022     (H) 10/03/2022     10/03/2022         Chemistry        Component Value Date/Time     10/03/2022 0721    K 4.0 10/03/2022 0721     10/03/2022 0721    CO2 25 10/03/2022 0721    BUN 11 10/03/2022 0721    CREATININE 1.5 (H) 10/03/2022 0721     (H) 10/03/2022 0721        Component Value Date/Time    CALCIUM 9.2 10/03/2022 0721    ALKPHOS 52 (L) 10/03/2022 0721    AST 10 10/03/2022 0721    ALT 5 (L) 10/03/2022 0721    BILITOT 0.4 10/03/2022 0721    ESTGFRAFRICA 60 07/23/2022 1922    EGFRNONAA 52 (A) 07/23/2022 1922          ? IMAGING   Results for orders placed or performed during the hospital encounter of 08/05/22 (from the past 2160 hour(s))   NM PET CT Routine FDG    Impression    Interval decrease in  soft tissue fullness and FDG avidity in the cervical region consistent with positive treatment response.  No detrimental interval change.      Electronically signed by: INÉS Sam MD  Date:    08/05/2022  Time:    13:51     *Note: Due to a large number of results and/or encounters for the requested time period, some results have not been displayed. A complete set of results can be found in Results Review.     No results found. However, due to the size of the patient record, not all encounters were searched. Please check Results Review for a complete set of results.    PATHOLOGY  2/2021  Station 7 lymph node, fine needle aspiration (8 smears, 1 cell block):       -  Positive for malignant cells, morphologically consistent with   metastatic squamous cell carcinoma     Assessment/Plan:       No diagnosis found.        Plan:     # metastatic squamous cell carcinoma of the cervix SCC cervix:  history of HSIL Status post LEEP 2017. In December 2020 follow-up with gynecology with new spotty vaginal bleeding/discharge with biopsy 12/7/20 showing squamous cell carcinoma consistent with cervical primary.  MRI pelvis performed showing locally advanced disease with 4.9 cm cervical mass with right parametrial involvement and enlarged right external iliac lymph node 1.5 x 1.2 cm. PET-CT showing avidity of cervical mass with extension to lower uterine segment, right vaginal focus of avidity and lymphadenopathy including right internal external iliac, periaortic, pericaval.  There is the new hypermetabolic lymphadenopathy in right subcarinal 2.3 cm SUV 5.8. Small 9 mm right paratracheal S base slightly increased no FDG avid SUV 2.4.  01/29/2021 multidisciplinary tumor board discussion of her case with review of imaging; concern for new avid lymphadenopathy particularly in right subcarinal may be most consistent with metastatic cervical squamous cell carcinoma; tumor board recommendation for biopsy to confirm for prognostic  information as well as given other concomitant malignancy to exclude alternative histology, although felt less likely metastatic breast given this is been well controlled and primarily bony disease involvement.  We discussed recommendation for systemic chemotherapy given advanced cervical squamous cell carcinoma with cisplatin, paclitaxel and bevacizumab with discontinuation of palbociclib but can continue aromatase inhibitor; taxane may also be active for her concomitant metastatic breast cancer.   - EBUS with biopsy 2/18/21, station 7 consistent with metastatic squamous cell carcinoma of cervical origin.  - STRATA requested on cervical SCC, ERBB3 <5% felt to be subclonal per report, PIK3CA amplification, [id number], KDM6A, FRANK, TMB low, PDL1 high may indicated sensitivity to check point inhibitor in future regimen.  - audiology exam 2/18/21, recommended that she let us know if any perceived change in her hearing throughout treatment and recommend repeat testing throughout to ensure no ototoxicity.  - [facility] germline genetic testing returned negative for mutation, provided to patient.  - cycle 1 day 1 cisplatin paclitaxel and bevacizumab on 02/22/2021  Repeat scans 04/26/2021 showed excellent improvement in thoracic and pelvic lymphadenopathy and primary cervical mass.  She is asymptomatic from this no further bleeding.  Recommended continuation of her current regimen which she is tolerating well with supportive care, IV fluids 3 times weekly per patient preference.  - restaging after cycle 6 she has continued improvement in metastatic cervical squamous cell carcinoma; avidity in left vulvar area diminished associated with known infection.  She has required substantial supportive care on chemotherapy and recommend given sustained improvement on scans continuing and absence of chemotherapy with bevacizumab alone and continue close surveillance.  She would like to continue IV fluids weekly as needed.    We reviewed in  detail restaging PET-CT January 2022 notable for uptake in cervical region concerning for oligo progression/recurrence of her disease.  Previously biopsy proven metastatic cervical cancer in lungs without evidence of recurrent mass/lymphadenopathy/avidity in this region or otherwise.    Given prior PDL1 high disease CPS >1 we decided to proceed with chemoimmunotherapy per Keynote 826, carboplatin, paclitaxel and pembrolizumab.   She completed 6 cycles of carboplatin paclitaxel with pembrolizumab (1 cycle without pembrolizumab from unclear reasons with my colleague) with interval brachytherapy with Dr. Kaur at [facility].    Restaging PET scan with diminished avidity vaginal cuff status post brachytherapy.  No new areas concerning for active disease.  We discussed consideration of holding further maintenance immunotherapy at this time given no evidence of recurrent / progressive metastatic disease and is amenable to this.  Will continue her breast cancer treatment per below.    # Hypothyroidism: Rising TSH and low free T4.  low normal free T4 and elevation in TSH.  Will dose increase by 25 mcg to 300mcg daily      Macrocytosis: Possibly related to thyroid disorder will continue to monitor.  She is on vitamin-B supplement.  No new anemia.    # neuropathy:  Mild, will monitor with re-initiation of chemotherapy per above.    # metastatic breast cancer ER positive HER2 negative:  Had been on systemic therapy with palbociclib and letrozole. While on systemic chemo due to c/f cytopenias/toxicity palbociclib was on hold.  Will be continuing on maintenance pembrolizumab labs without concerning cytopenia, mild anemia.  Recommend restarting palbociclib patient notes having this at home.  Will send script to specialty pharmacy.  Continuing letrozole.      # bony metastatic disease:  on zoledronic acid dosed every 12 weeks. - Last zometa 1/18/22; next planned 4/12/22  DEXA 8/2021 without bone loss    Recent PET scan January 2022 without evidence of new bony metastatic disease.  She feels most comfortable with maintenance q.3 months which is reasonable.  Resolution of ENT/left ear infection.restarted zometa support.   Zometa due 08/11/2022   However in light of progressive pain left ear/ jaw recommend holding at this time until further ENT evaluation.  Continue calcium vitamin-D supplementation.    # history of papillary thyroid carcinoma status post total thyroidectomy on 08/31/2017: s/p total thyroidectomy on levothyroxine.     # tobacco abuse:  Current tobacco use.   I have counseled for at least 3 min on the importance of tobacco cessation and discussed pharmacologic and therapy options to  aid in cessation.  She is interested in trial of bupropion prescribed to her local pharmacy.    Follow-Up:   Holding further pembrolizumab and zometa  RV in 2 mo with cbc, cmp prior

## 2022-10-04 ENCOUNTER — TELEPHONE (OUTPATIENT)
Dept: GYNECOLOGIC ONCOLOGY | Facility: CLINIC | Age: 52
End: 2022-10-04
Payer: MEDICAID

## 2022-10-04 NOTE — TELEPHONE ENCOUNTER
Spoke with our patient about her insurance, schedule appointment she voiced understanding of the date, time and location. All questions answered appointment mail. Provider Scheduling Coord.  Gynecologic Oncology MA/PAR /Preceptor Vladimir Silverio

## 2022-10-04 NOTE — TELEPHONE ENCOUNTER
----- Message from Yousif Desouza MD sent at 10/3/2022  4:53 PM CDT -----  Thanks.  She no showed last month.    Vladimir, please add on for this month. Thx.   ----- Message -----  From: Estefani Asencio MD  Sent: 10/3/2022   2:54 PM CDT  To: Yousif Desouza MD, Elif Gannon, please see AVS for fup plans

## 2022-10-05 ENCOUNTER — SPECIALTY PHARMACY (OUTPATIENT)
Dept: PHARMACY | Facility: CLINIC | Age: 52
End: 2022-10-05
Payer: MEDICAID

## 2022-10-05 DIAGNOSIS — C50.811 MALIGNANT NEOPLASM OF OVERLAPPING SITES OF RIGHT BREAST IN FEMALE, ESTROGEN RECEPTOR POSITIVE: Primary | ICD-10-CM

## 2022-10-05 DIAGNOSIS — Z17.0 MALIGNANT NEOPLASM OF OVERLAPPING SITES OF RIGHT BREAST IN FEMALE, ESTROGEN RECEPTOR POSITIVE: Primary | ICD-10-CM

## 2022-10-05 NOTE — TELEPHONE ENCOUNTER
Specialty Pharmacy - Refill Coordination    Specialty Medication Orders Linked to Encounter      Flowsheet Row Most Recent Value   Medication #1 palbociclib (IBRANCE) 125 mg Cap (Order#[id number], Rx#[id number])            Refill Questions - Documented Responses      Flowsheet Row Most Recent Value   Patient Availability and HIPAA Verification    Does patient want to proceed with activity? Yes   HIPAA/medical authority confirmed? Yes   Relationship to patient of person spoken to? Self   Refill Screening Questions    Changes to allergies? No   Changes to medications? No   New conditions since last clinic visit? No   Unplanned office visit, urgent care, ED, or hospital admission in the last 4 weeks? No   How does patient/caregiver feel medication is working? Good   Financial problems or insurance changes? No   How many doses of your specialty medications were missed in the last 4 weeks? 2   Why were doses missed? Felt ill or sick   Would patient like to speak to a pharmacist? No   When does the patient need to receive the medication? 10/14/22   Refill Delivery Questions    How will the patient receive the medication? [facility]   When does the patient need to receive the medication? 10/14/22   Shipping Address Home   Address in [facility] confirmed and updated if neccessary? Yes   Expected Copay ($) 0   Is the patient able to afford the medication copay? Yes   Payment Method zero copay   Days supply of Refill 28   Supplies needed? No supplies needed   Refill activity completed? Yes   Refill activity plan Refill scheduled   Shipment/Pickup Date: 10/10/22            Current Outpatient Medications   Medication Sig    albuterol (PROVENTIL) 2.5 mg /3 mL (0.083 %) nebulizer solution Take 3 mLs (2.5 mg total) by nebulization every 4 to 6 hours as needed for Wheezing or Shortness of Breath.    albuterol (PROVENTIL/VENTOLIN HFA) 90 mcg/actuation inhaler Inhale 2 puffs into the lungs every 4 (four) hours as needed for  Wheezing or Shortness of Breath.    ALPRAZolam (XANAX) 1 MG tablet Take 0.5-1 tablets (0.5-1 mg total) by mouth 2 (two) times daily as needed for Anxiety.    buPROPion (WELLBUTRIN SR) 150 MG TBSR 12 hr tablet Take 1 tablet (150 mg total) by mouth 2 (two) times daily. Take 1 tablet (150mg) once daily for 1 week then increase to twice daily    calcium carbonate 400 mg calcium (1,000 mg) Chew Take 2,000 mg by mouth once daily.    cyanocobalamin (VITAMIN B-12) 1000 MCG tablet Take 1 tablet (1,000 mcg total) by mouth once daily.    diphenoxylate-atropine 2.5-0.025 mg (LOMOTIL) 2.5-0.025 mg per tablet Take 1 tablet by mouth 4 (four) times daily as needed for Diarrhea.    ergocalciferol (VITAMIN D2) 50,000 unit Cap Take 5,000 Units by mouth once daily at 6am.     HYDROcodone-acetaminophen (NORCO)  mg per tablet Take 1 tablet by mouth every 4 (four) hours as needed for Pain. No more than 4 doses/ day    hydrOXYzine HCL (ATARAX) 25 MG tablet Take 1 tablet (25 mg total) by mouth 3 (three) times daily as needed for Itching.    ipratropium (ATROVENT) 42 mcg (0.06 %) nasal spray 2 sprays by Nasal route 4 (four) times daily. As needed for rhinitis. Take in evening before bed and in the morning    levothyroxine (SYNTHROID) 200 MCG tablet Take 1 tablet (200 mcg total) by mouth once daily.    levothyroxine (SYNTHROID) 75 MCG tablet Take 1 tablet (75 mcg total) by mouth before breakfast.    multivitamin (THERAGRAN) per tablet Take 1 tablet by mouth once daily.    naloxone (NARCAN) 4 mg/actuation Spry SMARTSIG:Both Nares    palbociclib (IBRANCE) 125 mg Cap Take one capsule (125 mg) by mouth once daily on days 1-21 of each 28-day cycle.    potassium chloride SA (K-DUR,KLOR-CON) 20 MEQ tablet Take 1 tablet (20 mEq total) by mouth once daily. Take daily for 3 days.   Last reviewed on 10/3/2022  2:42 PM by Estefani Asencio MD    Review of patient's allergies indicates:   Allergen Reactions    Gabapentin Swelling     Note:  unilateral joint edema, unclear if due to gabapentin    Nsaids (non-steroidal anti-inflammatory drug) Other (See Comments)     Instructed not to take NSAID drugs with chemo pill.    Clindamycin Rash    Vancomycin analogues Itching    Last reviewed on  10/3/2022 2:42 PM by Estefani Asencio      Tasks added this encounter   No tasks added.   Tasks due within next 3 months   11/1/2022 - Clinical - Follow Up Assesement (180 day)  10/3/2022 - Refill Call (Auto Added)     Aydee Harry, PharmD  [facility]  [address] [place] [id number]  Phone: [phone]  Fax: [phone]

## 2022-10-06 ENCOUNTER — TELEPHONE (OUTPATIENT)
Dept: OTOLARYNGOLOGY | Facility: CLINIC | Age: 52
End: 2022-10-06
Payer: MEDICAID

## 2022-10-06 ENCOUNTER — DOCUMENTATION ONLY (OUTPATIENT)
Dept: PALLIATIVE MEDICINE | Facility: CLINIC | Age: 52
End: 2022-10-06

## 2022-10-06 ENCOUNTER — OFFICE VISIT (OUTPATIENT)
Dept: PALLIATIVE MEDICINE | Facility: CLINIC | Age: 52
End: 2022-10-06
Payer: MEDICAID

## 2022-10-06 VITALS
DIASTOLIC BLOOD PRESSURE: 76 MMHG | BODY MASS INDEX: 31.44 KG/M2 | RESPIRATION RATE: 18 BRPM | TEMPERATURE: 97 F | SYSTOLIC BLOOD PRESSURE: 109 MMHG | WEIGHT: 200.31 LBS | HEIGHT: 67 IN | HEART RATE: 82 BPM

## 2022-10-06 DIAGNOSIS — C53.0 MALIGNANT NEOPLASM OF ENDOCERVIX: ICD-10-CM

## 2022-10-06 DIAGNOSIS — Z17.0 MALIGNANT NEOPLASM OF OVERLAPPING SITES OF RIGHT BREAST IN FEMALE, ESTROGEN RECEPTOR POSITIVE: ICD-10-CM

## 2022-10-06 DIAGNOSIS — C50.811 MALIGNANT NEOPLASM OF OVERLAPPING SITES OF RIGHT BREAST IN FEMALE, ESTROGEN RECEPTOR POSITIVE: ICD-10-CM

## 2022-10-06 DIAGNOSIS — H60.42 CHOLESTEATOMA OF EXTERNAL AUDITORY CANAL, LEFT: Primary | ICD-10-CM

## 2022-10-06 DIAGNOSIS — Z71.89 ACP (ADVANCE CARE PLANNING): ICD-10-CM

## 2022-10-06 DIAGNOSIS — F41.9 ANXIETY: ICD-10-CM

## 2022-10-06 DIAGNOSIS — C77.3 MALIGNANT NEOPLASM METASTATIC TO LYMPH NODE OF AXILLA: Primary | ICD-10-CM

## 2022-10-06 DIAGNOSIS — G89.3 NEOPLASM RELATED PAIN: Primary | ICD-10-CM

## 2022-10-06 PROCEDURE — 99214 OFFICE O/P EST MOD 30 MIN: CPT | Mod: S$PBB,,, | Performed by: PHYSICIAN ASSISTANT

## 2022-10-06 PROCEDURE — 3008F BODY MASS INDEX DOCD: CPT | Mod: CPTII,,, | Performed by: PHYSICIAN ASSISTANT

## 2022-10-06 PROCEDURE — 99999 PR PBB SHADOW E&M-EST. PATIENT-LVL V: ICD-10-PCS | Mod: PBBFAC,,, | Performed by: PHYSICIAN ASSISTANT

## 2022-10-06 PROCEDURE — 99214 PR OFFICE/OUTPT VISIT, EST, LEVL IV, 30-39 MIN: ICD-10-PCS | Mod: S$PBB,,, | Performed by: PHYSICIAN ASSISTANT

## 2022-10-06 PROCEDURE — 3008F PR BODY MASS INDEX (BMI) DOCUMENTED: ICD-10-PCS | Mod: CPTII,,, | Performed by: PHYSICIAN ASSISTANT

## 2022-10-06 PROCEDURE — 1159F MED LIST DOCD IN RCRD: CPT | Mod: CPTII,,, | Performed by: PHYSICIAN ASSISTANT

## 2022-10-06 PROCEDURE — 3078F PR MOST RECENT DIASTOLIC BLOOD PRESSURE < 80 MM HG: ICD-10-PCS | Mod: CPTII,,, | Performed by: PHYSICIAN ASSISTANT

## 2022-10-06 PROCEDURE — 99215 OFFICE O/P EST HI 40 MIN: CPT | Mod: PBBFAC | Performed by: PHYSICIAN ASSISTANT

## 2022-10-06 PROCEDURE — 1160F RVW MEDS BY RX/DR IN RCRD: CPT | Mod: CPTII,,, | Performed by: PHYSICIAN ASSISTANT

## 2022-10-06 PROCEDURE — 99999 PR PBB SHADOW E&M-EST. PATIENT-LVL V: CPT | Mod: PBBFAC,,, | Performed by: PHYSICIAN ASSISTANT

## 2022-10-06 PROCEDURE — 1160F PR REVIEW ALL MEDS BY PRESCRIBER/CLIN PHARMACIST DOCUMENTED: ICD-10-PCS | Mod: CPTII,,, | Performed by: PHYSICIAN ASSISTANT

## 2022-10-06 PROCEDURE — 3078F DIAST BP <80 MM HG: CPT | Mod: CPTII,,, | Performed by: PHYSICIAN ASSISTANT

## 2022-10-06 PROCEDURE — 3074F SYST BP LT 130 MM HG: CPT | Mod: CPTII,,, | Performed by: PHYSICIAN ASSISTANT

## 2022-10-06 PROCEDURE — 3074F PR MOST RECENT SYSTOLIC BLOOD PRESSURE < 130 MM HG: ICD-10-PCS | Mod: CPTII,,, | Performed by: PHYSICIAN ASSISTANT

## 2022-10-06 PROCEDURE — 1159F PR MEDICATION LIST DOCUMENTED IN MEDICAL RECORD: ICD-10-PCS | Mod: CPTII,,, | Performed by: PHYSICIAN ASSISTANT

## 2022-10-06 RX ORDER — LETROZOLE 2.5 MG/1
2.5 TABLET, FILM COATED ORAL DAILY
Qty: 30 TABLET | Refills: 2 | Status: SHIPPED | OUTPATIENT
Start: 2022-10-06 | End: 2022-11-20 | Stop reason: SDUPTHER

## 2022-10-06 RX ORDER — MORPHINE SULFATE 30 MG/1
30 TABLET, FILM COATED, EXTENDED RELEASE ORAL 2 TIMES DAILY
Qty: 60 TABLET | Refills: 0 | Status: SHIPPED | OUTPATIENT
Start: 2022-10-06 | End: 2022-10-07

## 2022-10-06 RX ORDER — HYDROCODONE BITARTRATE AND ACETAMINOPHEN 10; 325 MG/1; MG/1
1 TABLET ORAL EVERY 4 HOURS PRN
Qty: 120 TABLET | Refills: 0 | Status: SHIPPED | OUTPATIENT
Start: 2022-10-06 | End: 2022-10-07

## 2022-10-06 RX ORDER — ALPRAZOLAM 1 MG/1
.5-1 TABLET ORAL 2 TIMES DAILY PRN
Qty: 60 TABLET | Refills: 0 | Status: SHIPPED | OUTPATIENT
Start: 2022-10-06 | End: 2022-11-06 | Stop reason: SDUPTHER

## 2022-10-06 NOTE — PROGRESS NOTES
Clinical Drug Medication Monitoring Nurse Note:      Patient in office today for EP visit today with palliative for cancer related pain management.     Pt signed pain contract on file, understands this urine drug Monitoring screening is part of the palliative medicine protocol.  Pt agrees to the process.           Date: 10-06--2022     Urine Drug Test       ICD 10Code Used:  G89.3/ F11.20/279.819     Location of this visit : [facility]     Orders received by Provider Named :  RYAN Mendoza     Two Patient ID was preformed by Patient-  Yes     Urine  Specimen obtained  by nurse:  VERENA Cummings     Patient verified full name and date of birth, Lables placed on specimen- Yes     Urine was successfully collected with in normal ranges. 97 F     Labeled and sealed in presence of pt. - Yes     Nurse called [facility], Rep.with [facility] stated specimens will be picked up   Date: 10-     Nurse has specimen secured in [facility] Lock Box hanging on Lab Door.     Confirmation Number Provided: [id number]

## 2022-10-06 NOTE — ASSESSMENT & PLAN NOTE
Restaging PET scan with diminished avidity vaginal cuff status post brachytherapy      Holding further maintenance immunotherapy at this time given no evidence of recurrent / progressive metastatic disease

## 2022-10-06 NOTE — TELEPHONE ENCOUNTER
Please call and get her scheduled for CT Temporal bones and then followup with Dr. Wu in [place].  She is established with him.  Has left cholesteatoma.

## 2022-10-06 NOTE — PROGRESS NOTES
Palliative Medicine         Follow up    SUBJECTIVE:   Chief complaint: pain follow up    10/06/2022   She comes to clinic with DEYSI Mccallum today. She is down because her granddaughter is not in a good situation but otherwise feeling okay. Since we last saw each other her pain has improved which is likely related to the positive response to treatment. She would like to begin weaning the MSER back to 30mg BID and hopefully continue to decrease further. She is still taking hydrocodone 10mg QID with good response. Her mood improved with the resumption of Wellbutrin and is still cutting back on her tobacco use. She has been having ear pain which is being evaluated by ENT and is waiting to hear when her CT is scheduled. There seems to have been a charting error with her Letrozole resulting in d/c the order and unable to refill. I will reach out to oncology to reorder. We will follow up in 2 months and she will message me for refills whether she would like to continue weaning before then. We do not have HCPOA on file even though she remembers bringing to clinic. She will bring again next visit and confirms that her sister David is HCPOA.    Past visits:  09/01/22: Ms. Flores comes to clinic today with her fiance Alessio. She reports that her lower abdominal pain has worsened since brachytherapy started several weeks ago. She reports spontaneous lower abdominal pain not related to food but sometimes exacerbated by BM. She denies constipation. She has taken an extra dose of MSER at bedtime to help with sleep. We talked about the risks associated with this and would advise against doing this again. She is taking hydrocodone q4hr sometimes waking up at night and needing another dose averaging 4-6 in a day. I recommend we increase the long acting dose in hopes for better control and less PRN usage. She says her sleep is poor because this is when the pain intensifies (lying flat) but also anxiety and sadness since her granddaughter  "moved out of the home. She is taking Xanax HS which helps. She was taking Wellbutrin in the last but not sure why she stopped. For now lets not resume this and evaluate at her next visit.    06/16/2022: Patient doing okay.  No new issues since her last visit.  She continue to have some lower GI pains describes now as "cramping after a BM".  Previously, her discomfort was after eating.  She has been referred to GI.  She has some fatigue but managing.  Pain is controlled on current regimen.  She has upcoming scans and appointment with Dr. Asencio.  She has some anxiety and life stressors affecting her situation.  Her son and his ex-girfriend (now in [place]) can no longer care for their 4 year old daughter.  She and her partner are assuming the parental rights of her granddaughter.  They seem to be coping as well as can be expected.  Overall, she is doing okay.  We will continue to follow at quarterly intervals.  She knows to reach out if her situation changes or symptom burden worsens.    03/10/2022: Overall, patient seems to be doing okay.  She has some new GI issues with abdominal pain after eating and intermittent diarrhea not felt to be related to her disease or treatment.  Oncology discussed GI referral and she is considering this if no improvement.  We are adjusting her pain medication regimen today mostly to cut back on her prn Norco which she continues to use consistently at 5-6 tablets/day.  She knows she can reach out for additional questions or concerns.     01/06/2022: Patient is a 51 y.o. year old female presenting with for palliative follow up for her metastatic breast cancer, SCC of the cervix. Treatments currently on hold due to recurrent ear infection. She is anxious about results of her recent scan and fearful of disease progression while off treatment.  Otherwise, her pain continues to be controlled on her current regimen.  No new symptoms or complaints today.    ONCOLOGY DIAGNOSES:  Stage IV cervical " squamous cell carcinoma   stage IV, T2 N1b M1, invasive breast carcinoma, ER/MN+, HER2-   Papillary thyroid carcinoma status post total thyroidectomy on 08/31/2017, mpT1b N0    Cervical HSIL MIREILLE 3 s/p LEEP, 2017    Patient looks and feels better compared to last visit.  She walked into her appt.  Ear is better and she is being followed by ENT.  She has resumed treatment for both her cervical and breast cancer and is followed closely by medical oncology and gyn oncology.  Pain continues to be controlled on current medication regimen.  She is having knee issues with swelling and likely OA.  We discussed referral to orthopedics.  She also plans to follow up with neurosurgery.  Overall, no major changes and no new issues.     LA  reviewed and summarized:      OBJECTIVE:     ROS:  Review of Systems   Constitutional:  Positive for fatigue. Negative for activity change, appetite change, chills and fever.   HENT:  Negative for mouth sores, sore throat and trouble swallowing.    Respiratory:  Negative for cough and shortness of breath.    Gastrointestinal:  Positive for abdominal pain. Negative for abdominal distention, constipation, diarrhea, nausea and vomiting.   Genitourinary:  Negative for difficulty urinating and dysuria.   Musculoskeletal:  Positive for back pain. Negative for myalgias.   Skin:  Negative for rash and wound.   Allergic/Immunologic: Negative for immunocompromised state.   Neurological:  Negative for weakness and headaches.   Psychiatric/Behavioral:  Positive for sleep disturbance. Negative for confusion, decreased concentration and dysphoric mood. The patient is nervous/anxious.      Review of Symptoms      Symptom Assessment (ESAS 0-10 Scale)  Pain:  8  Dyspnea:  3  Anxiety:  5  Nausea:  0  Depression:  7  Anorexia:  0  Fatigue:  4  Insomnia:  8  Restlessness:  8  Agitation:  8     CAM / Delirium:  Negative  Constipation:  Negative  Diarrhea:  Positive    Anxiety:  Is nervous/anxious  Constipation:   No constipation    Bowel Management Plan (BMP):  Yes      Pain Assessment:    Location(s): ear    Ear       Location: left        Quality: None        Quantity: 8/10 in intensity        Chronicity: Onset 4 week(s) ago, unchanged        Aggravating Factors: none        Alleviating Factors: opiates        Associated Symptoms: none    ECOG Performance Status stGstrstastdstest:st st1st Living Arrangements:  Lives with spouse and Lives in home    Psychosocial/Cultural: Lives with her significant other of 16 years: they have 3 children combined, 2 sons from previous marriage. Granddaughter recently moved out of the home    Advance Care Planning   Advance Directives:   Living Will: Yes        Copy on chart: Yes    LaPOST: Yes    Do Not Resuscitate Status: Yes    Agent's Name:  David Acevedo, sister, [phone]    Decision Making:  Patient answered questions  Goals of Care: The patient endorses that what is most important right now is to focus on continue cancer targeted treatment    Accordingly, we have decided that the best plan to meet the patient's goals includes continuing with treatment        Physical Exam:  Vitals: Temp: 97 °F (36.1 °C) (10/06/22 1302)  Pulse: 82 (10/06/22 1302)  Resp: 18 (10/06/22 1302)  BP: 109/76 (10/06/22 1302)  Physical Exam  Vitals and nursing note reviewed.   Constitutional:       General: She is not in acute distress.     Appearance: Normal appearance. She is obese. She is ill-appearing (chronic).   HENT:      Head: Normocephalic and atraumatic.   Eyes:      Pupils: Pupils are equal, round, and reactive to light.   Cardiovascular:      Rate and Rhythm: Normal rate and regular rhythm.      Pulses: Normal pulses.      Heart sounds: Normal heart sounds. No murmur heard.  Pulmonary:      Effort: Pulmonary effort is normal. No respiratory distress.      Breath sounds: Normal breath sounds.   Abdominal:      General: Bowel sounds are normal. There is no distension.      Palpations: Abdomen is soft.       Tenderness: There is no abdominal tenderness.   Musculoskeletal:         General: Normal range of motion.      Cervical back: Normal range of motion.      Right lower leg: No edema.      Left lower leg: No edema.   Skin:     General: Skin is warm and dry.   Neurological:      Mental Status: She is alert and oriented to person, place, and time. Mental status is at baseline.      Cranial Nerves: No cranial nerve deficit.   Psychiatric:         Mood and Affect: Mood normal.         Behavior: Behavior normal.         Thought Content: Thought content normal.         Judgment: Judgment normal.       Radiology and laboratory data reviewed    ASSESSMENT/PLAN:     Problem List Items Addressed This Visit          Psychiatric    Anxiety    Overview     Continue Xanax 0.5mg-1mg BID PRN            Oncology    Malignant neoplasm of overlapping sites of right breast in female, estrogen receptor positive    Current Assessment & Plan     Dr. Asencio    Current treatment: palbociclib and letrozole         Neoplasm related pain - Primary    Overview     Begin weaning MSER back down to 30mg BID since disease is improving and pain is too    Continue hydrocodone 10mg QID PRN    Pain contract- 9/1/22  UDS collected- 10/6/22  Narcan rx- 9/1/22           Malignant neoplasm of endocervix    Current Assessment & Plan     Restaging PET scan with diminished avidity vaginal cuff status post brachytherapy      Holding further maintenance immunotherapy at this time given no evidence of recurrent / progressive metastatic disease              Palliative Care    ACP (advance care planning)    Overview     DNR; Tay completed and uploaded in EMR    Surrogate:  David Acevedo, sister, at [phone]. She will bring copy of HCPOA at next visit. Unfortunately this has not been scanned in when she brought it in the past.                  Follow up: 2 months    35 minutes of total time spent on the encounter, which includes face to face time and non-face  to face time preparing to see the patient (eg, review of tests), Obtaining and/or reviewing separately obtained history, Documenting clinical information in the electronic or other health record, Independently interpreting results (not separately reported) and communicating results to the patient/family/caregiver, or Care coordination (not separately reported).      Signature: Kyara Jones PA-C

## 2022-10-07 ENCOUNTER — TELEPHONE (OUTPATIENT)
Dept: PALLIATIVE MEDICINE | Facility: CLINIC | Age: 52
End: 2022-10-07
Payer: MEDICAID

## 2022-10-07 ENCOUNTER — PATIENT MESSAGE (OUTPATIENT)
Dept: PALLIATIVE MEDICINE | Facility: CLINIC | Age: 52
End: 2022-10-07
Payer: MEDICAID

## 2022-10-07 ENCOUNTER — DOCUMENTATION ONLY (OUTPATIENT)
Dept: PALLIATIVE MEDICINE | Facility: CLINIC | Age: 52
End: 2022-10-07
Payer: MEDICAID

## 2022-10-07 RX ORDER — HYDROCODONE BITARTRATE AND ACETAMINOPHEN 10; 325 MG/1; MG/1
1 TABLET ORAL EVERY 4 HOURS PRN
Qty: 120 TABLET | Refills: 0 | Status: SHIPPED | OUTPATIENT
Start: 2022-10-07 | End: 2022-11-01 | Stop reason: SDUPTHER

## 2022-10-07 RX ORDER — MORPHINE SULFATE 30 MG/1
30 TABLET, FILM COATED, EXTENDED RELEASE ORAL 2 TIMES DAILY
Qty: 60 TABLET | Refills: 0 | Status: SHIPPED | OUTPATIENT
Start: 2022-10-07 | End: 2022-11-01 | Stop reason: SDUPTHER

## 2022-10-07 NOTE — TELEPHONE ENCOUNTER
----- Message from Sancho Noguera sent at 10/6/2022  4:23 PM CDT -----  Contact: [phone]  Pt would like to consult with a nurse in regards to her prescription. Pt stated that she would like her prescription transferred over to the [facility] at [facility].please call her back at [phone]. Thanks KB

## 2022-10-07 NOTE — TELEPHONE ENCOUNTER
Late note: nurse returned call to pt, prior authorization was submitted, pt's pain script is still in pending stage, she is requesting to have sript transferred to [facility], provider notified.

## 2022-10-07 NOTE — PROGRESS NOTES
Pt pharmacy required a PA for her Narco  mg due to expiration date, her PA # [id number] with a new approval date of 10-7-2022-10-7-2023, nurse reported information to [facility], pt was notified and provider.

## 2022-10-10 ENCOUNTER — DOCUMENTATION ONLY (OUTPATIENT)
Dept: PALLIATIVE MEDICINE | Facility: HOSPITAL | Age: 52
End: 2022-10-10
Payer: MEDICAID

## 2022-10-10 ENCOUNTER — HOSPITAL ENCOUNTER (OUTPATIENT)
Dept: RADIOLOGY | Facility: HOSPITAL | Age: 52
Discharge: HOME | End: 2022-10-10
Attending: PHYSICIAN ASSISTANT
Payer: MEDICAID

## 2022-10-10 DIAGNOSIS — H60.42 CHOLESTEATOMA OF EXTERNAL AUDITORY CANAL, LEFT: ICD-10-CM

## 2022-10-10 PROCEDURE — 70480 CT TEMPORAL BONE WITHOUT CONTRAST: ICD-10-PCS | Mod: 26,,, | Performed by: RADIOLOGY

## 2022-10-10 PROCEDURE — 70480 CT ORBIT/EAR/FOSSA W/O DYE: CPT | Mod: TC

## 2022-10-10 PROCEDURE — 70480 CT ORBIT/EAR/FOSSA W/O DYE: CPT | Mod: 26,,, | Performed by: RADIOLOGY

## 2022-10-13 ENCOUNTER — TELEPHONE (OUTPATIENT)
Dept: OTOLARYNGOLOGY | Facility: CLINIC | Age: 52
End: 2022-10-13
Payer: MEDICAID

## 2022-10-13 RX ORDER — OFLOXACIN 3 MG/ML
3 SOLUTION AURICULAR (OTIC) 2 TIMES DAILY
Qty: 5 ML | Refills: 0 | Status: SHIPPED | OUTPATIENT
Start: 2022-10-13 | End: 2022-10-20

## 2022-10-13 NOTE — TELEPHONE ENCOUNTER
Called to discuss results of recent CT Temporal bones and recommend she keep scheduled followup with Dr. Wu next week.  She reports left ear pain is better than when I saw her last but she noticed slight pus-like drainage in left ear this AM.  I recommend Floxin drops BID AS until she sees Dr. Wu next week.  Will send Rx to her pharmacy on file.  She voiced understanding and thanked me for the call.

## 2022-10-14 ENCOUNTER — TELEPHONE (OUTPATIENT)
Dept: HEMATOLOGY/ONCOLOGY | Facility: CLINIC | Age: 52
End: 2022-10-14
Payer: MEDICAID

## 2022-10-14 NOTE — TELEPHONE ENCOUNTER
Nurse called pt, pt states that she is taking antibiotic for ear and pt doesn't know if she should take her ibrance along with taking antibiotic. Nurse will route to MD for advice

## 2022-10-14 NOTE — TELEPHONE ENCOUNTER
Nurse called pt back to let her know to hold the ibrance while taking antibiotics. No answer nurse left detailed vm.

## 2022-10-19 ENCOUNTER — OFFICE VISIT (OUTPATIENT)
Dept: GYNECOLOGIC ONCOLOGY | Facility: CLINIC | Age: 52
End: 2022-10-19
Payer: MEDICAID

## 2022-10-19 VITALS
HEIGHT: 67 IN | BODY MASS INDEX: 31.26 KG/M2 | SYSTOLIC BLOOD PRESSURE: 128 MMHG | WEIGHT: 199.19 LBS | DIASTOLIC BLOOD PRESSURE: 66 MMHG

## 2022-10-19 DIAGNOSIS — Z85.41 PERSONAL HISTORY OF MALIGNANT NEOPLASM OF CERVIX UTERI: Primary | ICD-10-CM

## 2022-10-19 DIAGNOSIS — C50.811 MALIGNANT NEOPLASM OF OVERLAPPING SITES OF RIGHT BREAST IN FEMALE, ESTROGEN RECEPTOR POSITIVE: ICD-10-CM

## 2022-10-19 DIAGNOSIS — Z17.0 MALIGNANT NEOPLASM OF OVERLAPPING SITES OF RIGHT BREAST IN FEMALE, ESTROGEN RECEPTOR POSITIVE: ICD-10-CM

## 2022-10-19 DIAGNOSIS — Z08 ENCOUNTER FOR FOLLOW-UP EXAMINATION AFTER COMPLETED TREATMENT FOR MALIGNANT NEOPLASM: ICD-10-CM

## 2022-10-19 DIAGNOSIS — C73 PAPILLARY THYROID CARCINOMA: ICD-10-CM

## 2022-10-19 DIAGNOSIS — Z72.0 TOBACCO ABUSE: ICD-10-CM

## 2022-10-19 DIAGNOSIS — C79.51 SECONDARY CANCER OF BONE: ICD-10-CM

## 2022-10-19 DIAGNOSIS — C77.3 MALIGNANT NEOPLASM METASTATIC TO LYMPH NODE OF AXILLA: ICD-10-CM

## 2022-10-19 DIAGNOSIS — E66.9 OBESITY (BMI 30-39.9): ICD-10-CM

## 2022-10-19 PROCEDURE — 99999 PR PBB SHADOW E&M-EST. PATIENT-LVL III: ICD-10-PCS | Mod: PBBFAC,,, | Performed by: OBSTETRICS & GYNECOLOGY

## 2022-10-19 PROCEDURE — 3078F PR MOST RECENT DIASTOLIC BLOOD PRESSURE < 80 MM HG: ICD-10-PCS | Mod: CPTII,,, | Performed by: OBSTETRICS & GYNECOLOGY

## 2022-10-19 PROCEDURE — 3074F SYST BP LT 130 MM HG: CPT | Mod: CPTII,,, | Performed by: OBSTETRICS & GYNECOLOGY

## 2022-10-19 PROCEDURE — 99215 OFFICE O/P EST HI 40 MIN: CPT | Mod: S$PBB,,, | Performed by: OBSTETRICS & GYNECOLOGY

## 2022-10-19 PROCEDURE — 3074F PR MOST RECENT SYSTOLIC BLOOD PRESSURE < 130 MM HG: ICD-10-PCS | Mod: CPTII,,, | Performed by: OBSTETRICS & GYNECOLOGY

## 2022-10-19 PROCEDURE — 99213 OFFICE O/P EST LOW 20 MIN: CPT | Mod: PBBFAC | Performed by: OBSTETRICS & GYNECOLOGY

## 2022-10-19 PROCEDURE — 99215 PR OFFICE/OUTPT VISIT, EST, LEVL V, 40-54 MIN: ICD-10-PCS | Mod: S$PBB,,, | Performed by: OBSTETRICS & GYNECOLOGY

## 2022-10-19 PROCEDURE — 3078F DIAST BP <80 MM HG: CPT | Mod: CPTII,,, | Performed by: OBSTETRICS & GYNECOLOGY

## 2022-10-19 PROCEDURE — 99999 PR PBB SHADOW E&M-EST. PATIENT-LVL III: CPT | Mod: PBBFAC,,, | Performed by: OBSTETRICS & GYNECOLOGY

## 2022-10-19 NOTE — Clinical Note
Hey, she signed a JUAN form for [facility].  I am most interested in her visit to the ED this month and CT scan she had done there.  Can you please follow-up?  Also, can we please schedule a PET/CT in 3 months right before her RONC? Thanks.

## 2022-10-19 NOTE — PROGRESS NOTES
REASON FOR CONSULT  Josey Flores  is a 51 y.o.  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  The interval history is as follows: +PO. -N/V. +Flatus. +BM. -VB, VD, changes in stool or urine. -Abdominal or pelvic pain. -Unintentional weight loss.      Oncology History   Secondary cancer of bone   Malignant neoplasm of endocervix   9/10/2020 Imaging Significant Findings    CT A/P: Negative for ascites, omental caking, lymphadenopathy, or a gross cervical mass     12/7/2020 Biopsy    EMBX: SCC, p16+     1/12/2021 Imaging Significant Findings    MRI Pelvis w/ w/o  4.9 cm cervical mass  R parametrial invasion  R external iliac lymphadenopathy     1/27/2021 Imaging Significant Findings    PET/CT: FDG avidity in the chest     2/18/2021 Biopsy    Endoscopic biopsy of the lung: +      2/22/2021 - 6/29/2021 Chemotherapy    Cisplatin/paclitaxel/avastin x6     3/12/2021 Genetic Testing    STRATA: FRANK, PIK3c, Ep300, ERBB3, KDM6A     4/24/2021 Imaging Significant Findings    CT Pelvis w/: JUAN FRANCISCO with a normal cervix     4/26/2021 Imaging Significant Findings    PET/CT: No FDG avidity     5/18/2021 Imaging Significant Findings    CT A/P w/: JUAN FRANCISCO     6/5/2021 Imaging Significant Findings    CT A/P w/: JUAN FRANCISCO     7/12/2021 Imaging Significant Findings    PET/CT: JUAN FRANCISCO     7/13/2021 - 12/28/2021 Maintenance Therapy    Avastin x 9     10/20/2021 Imaging Significant Findings    PET/CT: JUAN FRANCISCO     1/11/2022 Imaging Significant Findings    PET/CT: Increased FDG avidity in the cervix.  No other evidence of disease.     1/28/2022 - 6/2/2022 Chemotherapy    Carboplatin/paclitaxel/pembrolizumab x 6     3/25/2022 Imaging Significant Findings    PET/CT: Persistent FDG avidity in the cervix.  New FDG avidity in the inguinal lymph nodes (although it does not look suspicious)     4/25/2022 - 4/29/2022 Radiation  Therapy    Treating physician: Dr. Vázquez (MRI guided T&O)  Total Dose: 30 Gy  Fractions: 5     6/30/2022 - 8/11/2022 Maintenance Therapy    Pembrolizumab 400 mg IV x2     8/5/2022 Imaging Significant Findings    PET/CT: JUAN FRANCISCO          The following portions of the patient's history were reviewed and updated as appropriate: allergies, current medications, family history, medical history, social history and surgical history.    REVIEW OF SYSTEMS  All systems reviewed and negative except as noted in HPI.    OBJECTIVE   Vitals:    10/19/22 1306   BP: 128/66        Body mass index is 31.2 kg/m².      1. General: Well appearing, no apparent distress, alert and oriented.  2. Lymph: Neck symmetric without cervical or supraclavicular adenopathy or mass.  3. Lungs: Normal respiratory rate, no accessory muscle use.  4. Cardiac: Normal rate  5. Psych: Normal affect.  6. Abdomen:  non-distended, soft, non-tender, are no masses, no ascites, no hepatosplenomegaly.  7. Skin: Warm, dry, no rashes or lesions.   8. Extremities: Bilateral lower extremities without edema or tenderness.  9. Genitourinary               Pelvic Examination including:                a. External genitalia are normal in appearance. No lesions noted.  No inguinal lymphadenopathy               b. Urethral meatus is normal size, location, and appearance.               c. Urethra is negative.               d. Bladder is nontender. No masses noted.               e. Vagina has normal mucosa with radiation changes.  Cervix could not be visualized but was normal to palpation.  No nodularity along the vaginal walls.   f. Rectum with normal mucosa    ECOG status: 2    LABORATORY DATA  Lab data reviewed.    RADIOLOGICAL DATA  Radiology data reviewed.    ASSESSMENT / PLAN     1. Personal history of malignant neoplasm of cervix uteri    2. Encounter for follow-up examination after completed treatment for malignant neoplasm    3. Malignant neoplasm of overlapping sites of  right breast in female, estrogen receptor positive    4. Malignant neoplasm metastatic to lymph node of axilla    5. Secondary cancer of bone    6. Papillary thyroid carcinoma    7. Obesity (BMI 30-39.9)    8. Tobacco abuse      F/U OSH records    JUAN FRANCISCO.  PET/CT, RONC 3 months. We will discontinue routine imaging if JUAN FRANCISCO on her next exam.    PATIENT EDUCATION  Ready to learn, no apparent learning barriers were identified; learning preferences include listening. Explained diagnosis and treatment plan; patient expressed understanding of the content.    ADMINISTRATIVE BILLING  Greater than 50% of was spent in counseling.

## 2022-10-19 NOTE — LETTER
October 19, 2022        Denise Vázquez MD  [address]  [place]  [facility] [address]             [facility]  [address] [address]  Phone: [phone]  Fax: [phone]   Patient: Josey Flores   MR Number: [id number]   YOB: 1970   Date of Visit: 10/19/2022       Dear Dr. Vázquez:    Thank you for referring Josey Flores to me for evaluation. Attached you will find relevant portions of my assessment and plan of care.    If you have questions, please do not hesitate to call me. I look forward to following Josey Flores along with you.    Sincerely,      Yousif Desouza MD            CC    No Recipients    Enclosure

## 2022-10-20 ENCOUNTER — OFFICE VISIT (OUTPATIENT)
Dept: OTOLARYNGOLOGY | Facility: CLINIC | Age: 52
End: 2022-10-20
Payer: MEDICAID

## 2022-10-20 VITALS — TEMPERATURE: 98 F | BODY MASS INDEX: 31.52 KG/M2 | WEIGHT: 201.25 LBS

## 2022-10-20 DIAGNOSIS — H60.392 OTHER INFECTIVE CHRONIC OTITIS EXTERNA OF LEFT EAR: ICD-10-CM

## 2022-10-20 DIAGNOSIS — H60.42 CHOLESTEATOMA OF EXTERNAL AUDITORY CANAL, LEFT: Primary | ICD-10-CM

## 2022-10-20 DIAGNOSIS — H92.02 OTALGIA OF LEFT EAR: ICD-10-CM

## 2022-10-20 PROCEDURE — 1159F PR MEDICATION LIST DOCUMENTED IN MEDICAL RECORD: ICD-10-PCS | Mod: CPTII,,, | Performed by: OTOLARYNGOLOGY

## 2022-10-20 PROCEDURE — 99213 OFFICE O/P EST LOW 20 MIN: CPT | Mod: S$PBB,,, | Performed by: OTOLARYNGOLOGY

## 2022-10-20 PROCEDURE — 99213 OFFICE O/P EST LOW 20 MIN: CPT | Mod: PBBFAC | Performed by: OTOLARYNGOLOGY

## 2022-10-20 PROCEDURE — 99999 PR PBB SHADOW E&M-EST. PATIENT-LVL III: ICD-10-PCS | Mod: PBBFAC,,, | Performed by: OTOLARYNGOLOGY

## 2022-10-20 PROCEDURE — 99999 PR PBB SHADOW E&M-EST. PATIENT-LVL III: CPT | Mod: PBBFAC,,, | Performed by: OTOLARYNGOLOGY

## 2022-10-20 PROCEDURE — 1159F MED LIST DOCD IN RCRD: CPT | Mod: CPTII,,, | Performed by: OTOLARYNGOLOGY

## 2022-10-20 PROCEDURE — 99213 PR OFFICE/OUTPT VISIT, EST, LEVL III, 20-29 MIN: ICD-10-PCS | Mod: S$PBB,,, | Performed by: OTOLARYNGOLOGY

## 2022-10-20 NOTE — PROGRESS NOTES
Subjective:       Patient ID: Josey Flores is a 51 y.o. female.    Chief Complaint: left ear pain     Follow-up     10/20/2022: with hx of BRCA s/p chemotherapy.  She has been identified to have bilateral ear canal erosions, being managed non-operatively with routine cleanings. She is here for re-evaluation as recent CT showed now development of problems with right ear canal.          Review of Systems   HENT:  Positive for ear discharge, ear pain and facial swelling.    Eyes: Negative.    Respiratory:  Positive for wheezing.    Gastrointestinal: Negative.    Endocrine: Positive for cold intolerance and heat intolerance.   Genitourinary: Negative.    Musculoskeletal:  Positive for back pain.   Integumentary:  Negative.   Allergic/Immunologic: Negative.    Neurological: Negative.    Hematological: Negative.    Psychiatric/Behavioral:  Positive for decreased concentration and sleep disturbance. The patient is nervous/anxious.        Objective:      Physical Exam  Constitutional:       Appearance: Normal appearance.   HENT:      Head: Atraumatic.      Right Ear: External ear normal.      Left Ear: External ear normal.   Neurological:      Mental Status: She is alert.      Binocular Microscopy  Indications: chronic otorrhea   Details: binocular microscopy used to examine both ears  Findings  AD: within normal limits  AS: inferior erosion of EAC with bone exposure, some moist cerumen and eyrthema of eroded area, mostly superficial involving inferior tympanic bone, no active infection    CT temporal bones image independently reviewed by me and show: Cts from 10/21 and 10/22 independently reviewed by me.  I don't see any obvious changes in the amount of bone erosion comparatively and right side bony changes are extremely mild.        Assessment:       Problem List Items Addressed This Visit          ENT    Otalgia of left ear     Other Visit Diagnoses       Cholesteatoma of external auditory canal, left    -   Primary    Other infective chronic otitis externa of left ear                Plan:         In summary this is a pleasant 51 y.o. with history of left EAC cholesteatoma.  She has a history breast cancer and tobacco use.      We discussed options including elective canaloplasty vs tympmastoid vs continue regular cleanings.    We discussed that CT did not show any significant changes compared to prior and clinic al exam is unchanged. We discussed role of tobacco abuse in wound healing.  She declines surgery at this time.  Recommend routine cleanings as needed.

## 2022-10-21 ENCOUNTER — TELEPHONE (OUTPATIENT)
Dept: GYNECOLOGIC ONCOLOGY | Facility: CLINIC | Age: 52
End: 2022-10-21
Payer: MEDICAID

## 2022-10-21 NOTE — TELEPHONE ENCOUNTER
----- Message from Yousif Desouza MD sent at 10/19/2022  2:29 PM CDT -----  Hey, she signed a JUAN form for medical records from [facility].  I am most interested in her visit to the ED this month and CT scan she had done there.  Can you please follow-up?  Also, can we please schedule a PET/CT in 3 months right before her RONC? Thanks.

## 2022-10-21 NOTE — TELEPHONE ENCOUNTER
JAUN with request for records sent to [facility] medical records and confirmation of fax being sent received at 8:45am

## 2022-10-21 NOTE — TELEPHONE ENCOUNTER
Pt contacted and LVM updating pt that PET scan was scheduled the week prior to her follow up with Dr Desouza. It is for 1/11/22 at 9:45am. Direct number for radiology [phone] provided if that appointment does not work in the pts scheduled. Pt also provided with direct navigator phone number for return call with any concerns or questions.

## 2022-10-26 NOTE — PROGRESS NOTES
When I saw the patient in clinic last week, she said she was seen at [facility] ED and had a CT scan performed.  She stated that no one followed up with results and requested that I get the results and call her with them.  I reviewed the CT scan results and there is no evidence of recurrent disease.  I tried calling the patient, and she didn't answer so I left her a voicemail.

## 2022-11-01 ENCOUNTER — PATIENT MESSAGE (OUTPATIENT)
Dept: PALLIATIVE MEDICINE | Facility: CLINIC | Age: 52
End: 2022-11-01
Payer: MEDICAID

## 2022-11-01 DIAGNOSIS — G89.3 NEOPLASM RELATED PAIN: ICD-10-CM

## 2022-11-01 RX ORDER — MORPHINE SULFATE 30 MG/1
30 TABLET, FILM COATED, EXTENDED RELEASE ORAL 2 TIMES DAILY
Qty: 60 TABLET | Refills: 0 | Status: SHIPPED | OUTPATIENT
Start: 2022-11-01 | End: 2022-12-06 | Stop reason: SDUPTHER

## 2022-11-01 RX ORDER — HYDROCODONE BITARTRATE AND ACETAMINOPHEN 10; 325 MG/1; MG/1
1 TABLET ORAL EVERY 4 HOURS PRN
Qty: 120 TABLET | Refills: 0 | Status: SHIPPED | OUTPATIENT
Start: 2022-11-01 | End: 2022-12-06 | Stop reason: SDUPTHER

## 2022-11-01 NOTE — PROGRESS NOTES
I received a message from Ms. Flores asking for an early refill as she is going to run out of her medications before our visits. She reports that she was admitted to the hospital ([facility]) due to abdominal pain and then when she was discharged she cut her foot very badly. Due to these issues she used her medications more frequently than she was prescribed. She was taking 2 tabs of MSER 30mg BID instead of 1 BID. She was taking hydrocodone 4-5x/ day. She admits that she was not supposed to do this and will not do this again. She was hoping that I would write the 15mg ER tabs to hold her over. I explained why I would not write for the 15mg and typically do not write early refills. This is the second time that she has taken ER medications more frequently than prescribed. I counseled her again on the risks with this behavior and this would be her last warning as well as last early refill. Moving forward if she takes opioids other than rx this is grounds for dismissal from our clinic given issues with impulse control and safety. She expressed understanding and says that her  has also scolded her. She knows this was not a good choice and continues to apologize and promises she will not do it again. I explained that my boundaries in clinic are not punitive but rather in place for her safety. My successor was present for the conversation and agrees that early refills will be not be permitted moving forward.

## 2022-11-07 ENCOUNTER — SPECIALTY PHARMACY (OUTPATIENT)
Dept: PHARMACY | Facility: CLINIC | Age: 52
End: 2022-11-07
Payer: MEDICAID

## 2022-11-07 DIAGNOSIS — C50.811 MALIGNANT NEOPLASM OF OVERLAPPING SITES OF RIGHT BREAST IN FEMALE, ESTROGEN RECEPTOR POSITIVE: Primary | ICD-10-CM

## 2022-11-07 DIAGNOSIS — Z17.0 MALIGNANT NEOPLASM OF OVERLAPPING SITES OF RIGHT BREAST IN FEMALE, ESTROGEN RECEPTOR POSITIVE: Primary | ICD-10-CM

## 2022-11-07 NOTE — TELEPHONE ENCOUNTER
Specialty Pharmacy - Clinical Reassessment    Specialty Medication Orders Linked to Encounter      Flowsheet Row Most Recent Value   Medication #1 palbociclib (IBRANCE) 125 mg Cap (Order#[id number], Rx#[id number])          Patient Diagnosis   C50.811, Z17.0 - Malignant neoplasm of overlapping sites of right breast in female, estrogen receptor positive    Josey Flores is a 52 y.o. female, who is followed by the specialty pharmacy service for management and education of her Ibrance.  She has been on therapy with Ibrance for over 5 years months.  I have reviewed her electronic medical record and current medication list and determined that specialty medication adjustment Is not needed at this time.    Patient has not experienced adverse events.  She Is adherent reporting 2 missed doses since last review.  Pt reported feeling ill or sick when she missed a couple of doses for that month. Previous months, pt reports no missed doses. Adherence has been encouraged with the following mechanism(s): calendar and proactive refill calls.  She is meeting goals of therapy and will continue treatment.        10/5/2022 9/7/2022 8/11/2022 5/12/2022 4/5/2022 3/4/2022 2/10/2022   Follow Up Review   # of missed doses 2 0 0 0 1 0 0   Reason Felt ill or sick         New Medications? No No No No No No No   New Conditions? No No No No No No No   New Allergies? No No No No No No No   Med Effective? Good Good Good Good Excellent Excellent Fair   Urgent Care? No No No No No No No   Requested Pharmacist? No No No No No No No       Multiple values from one day are sorted in reverse-chronological order         Therapy is appropriate to continue.    Therapy is effective: Yes  On scale of 1 to 10, how does patient rank quality of life? (10 - Best): Unable to Assess  Recommendations: none at this time.  Review Method: Chart Review    Tasks added this encounter   No tasks added.   Tasks due within next 3 months   11/6/2022 - Clinical - Follow Up  Kelin (180 day)  11/6/2022 - Refill Call (Auto Added)     Ana Laura Huntley, PharmD  [facility]  [address]  [facility] [address]  Phone: [phone]  Fax: [phone]

## 2022-11-07 NOTE — TELEPHONE ENCOUNTER
Outgoing call to pt for Ibrance refill. Pt stated she has about 4-5 days left before off week starts. Pt was agreeable to call on 11/11 when off week starts for refill. Pended call accordingly.

## 2022-11-11 ENCOUNTER — PATIENT MESSAGE (OUTPATIENT)
Dept: PHARMACY | Facility: CLINIC | Age: 52
End: 2022-11-11
Payer: MEDICAID

## 2022-11-11 NOTE — TELEPHONE ENCOUNTER
Specialty Pharmacy - Refill Coordination    Specialty Medication Orders Linked to Encounter      Flowsheet Row Most Recent Value   Medication #1 palbociclib (IBRANCE) 125 mg Cap (Order#[id number], Rx#[id number])            Refill Questions - Documented Responses      Flowsheet Row Most Recent Value   Patient Availability and HIPAA Verification    Does patient want to proceed with activity? Yes   HIPAA/medical authority confirmed? Yes   Relationship to patient of person spoken to? Self   Refill Screening Questions    Changes to allergies? No   Changes to medications? No   New conditions since last clinic visit? No   Unplanned office visit, urgent care, ED, or hospital admission in the last 4 weeks? Yes  [was in hospital for one night a few weeks ago but no changes were made to medications and she is doing better now]   How does patient/caregiver feel medication is working? Good   Financial problems or insurance changes? No   Would patient like to speak to a pharmacist? No   When does the patient need to receive the medication? 11/19/22   Refill Delivery Questions    How will the patient receive the medication? [facility]   When does the patient need to receive the medication? 11/19/22   Shipping Address Home   Address in [facility] confirmed and updated if neccessary? Yes   Expected Copay ($) 0   Is the patient able to afford the medication copay? Yes   Payment Method zero copay   Days supply of Refill 28   Supplies needed? No supplies needed   Refill activity completed? Yes   Refill activity plan Refill scheduled   Shipment/Pickup Date: 11/15/22            Current Outpatient Medications   Medication Sig    albuterol (PROVENTIL/VENTOLIN HFA) 90 mcg/actuation inhaler Inhale 2 puffs into the lungs every 4 (four) hours as needed for Wheezing or Shortness of Breath.    ALPRAZolam (XANAX) 1 MG tablet Take 0.5-1 tablets (0.5-1 mg total) by mouth 2 (two) times daily as needed for Anxiety.    buPROPion (WELLBUTRIN SR) 150  MG TBSR 12 hr tablet Take 1 tablet (150 mg total) by mouth 2 (two) times daily. Take 1 tablet (150mg) once daily for 1 week then increase to twice daily    calcium carbonate 400 mg calcium (1,000 mg) Chew Take 2,000 mg by mouth once daily.    cyanocobalamin (VITAMIN B-12) 1000 MCG tablet Take 1 tablet (1,000 mcg total) by mouth once daily.    diphenoxylate-atropine 2.5-0.025 mg (LOMOTIL) 2.5-0.025 mg per tablet Take 1 tablet by mouth 4 (four) times daily as needed for Diarrhea.    ergocalciferol (VITAMIN D2) 50,000 unit Cap Take 5,000 Units by mouth once daily at 6am.     HYDROcodone-acetaminophen (NORCO)  mg per tablet Take 1 tablet by mouth every 4 (four) hours as needed for Pain. No more than 4 doses/ day    hydrOXYzine HCL (ATARAX) 25 MG tablet Take 1 tablet (25 mg total) by mouth 3 (three) times daily as needed for Itching.    ipratropium (ATROVENT) 42 mcg (0.06 %) nasal spray 2 sprays by Nasal route 4 (four) times daily. As needed for rhinitis. Take in evening before bed and in the morning    letrozole (FEMARA) 2.5 mg Tab Take 1 tablet (2.5 mg total) by mouth once daily.    levothyroxine (SYNTHROID) 200 MCG tablet Take 1 tablet (200 mcg total) by mouth once daily.    levothyroxine (SYNTHROID) 75 MCG tablet Take 1 tablet (75 mcg total) by mouth before breakfast.    morphine (MS CONTIN) 30 MG 12 hr tablet Take 1 tablet (30 mg total) by mouth 2 (two) times daily.    multivitamin (THERAGRAN) per tablet Take 1 tablet by mouth once daily.    naloxone (NARCAN) 4 mg/actuation Spry SMARTSIG:Both Nares    palbociclib (IBRANCE) 125 mg Cap Take one capsule (125 mg) by mouth once daily on days 1-21 of each 28-day cycle.    potassium chloride SA (K-DUR,KLOR-CON) 20 MEQ tablet Take 1 tablet (20 mEq total) by mouth once daily. Take daily for 3 days.   Last reviewed on 11/7/2022 11:37 AM by Ana Laura Huntley, PharmD    Review of patient's allergies indicates:   Allergen Reactions    Gabapentin Swelling     Note:  unilateral joint edema, unclear if due to gabapentin    Nsaids (non-steroidal anti-inflammatory drug) Other (See Comments)     Instructed not to take NSAID drugs with chemo pill.    Clindamycin Rash    Vancomycin analogues Itching    Last reviewed on  11/7/2022 11:37 AM by Ana Laura Huntley      Tasks added this encounter   No tasks added.   Tasks due within next 3 months   11/11/2022 - Refill Call (Auto Added)     Carolyn Perdomo, PharmD  [facility]  [address] [place], [place] [address]  [place] [address]  Phone: [phone]  Fax: [phone]

## 2022-11-14 ENCOUNTER — OFFICE VISIT (OUTPATIENT)
Dept: INTERNAL MEDICINE | Facility: CLINIC | Age: 52
End: 2022-11-14
Payer: MEDICAID

## 2022-11-14 VITALS
BODY MASS INDEX: 31.07 KG/M2 | OXYGEN SATURATION: 98 % | DIASTOLIC BLOOD PRESSURE: 80 MMHG | RESPIRATION RATE: 20 BRPM | TEMPERATURE: 99 F | HEART RATE: 88 BPM | WEIGHT: 198.38 LBS | SYSTOLIC BLOOD PRESSURE: 114 MMHG

## 2022-11-14 DIAGNOSIS — E66.9 OBESITY (BMI 30-39.9): ICD-10-CM

## 2022-11-14 DIAGNOSIS — C50.919 PRIMARY MALIGNANT NEOPLASM OF BREAST WITH METASTASIS TO OTHER SITE, UNSPECIFIED LATERALITY: ICD-10-CM

## 2022-11-14 DIAGNOSIS — C53.9 RECURRENT CERVICAL CANCER: ICD-10-CM

## 2022-11-14 DIAGNOSIS — S91.311A LACERATION OF RIGHT FOOT, INITIAL ENCOUNTER: Primary | ICD-10-CM

## 2022-11-14 PROCEDURE — 1160F RVW MEDS BY RX/DR IN RCRD: CPT | Mod: CPTII,,,

## 2022-11-14 PROCEDURE — 3008F PR BODY MASS INDEX (BMI) DOCUMENTED: ICD-10-PCS | Mod: CPTII,,,

## 2022-11-14 PROCEDURE — 99214 OFFICE O/P EST MOD 30 MIN: CPT | Mod: S$PBB,,,

## 2022-11-14 PROCEDURE — 3008F BODY MASS INDEX DOCD: CPT | Mod: CPTII,,,

## 2022-11-14 PROCEDURE — 99999 PR PBB SHADOW E&M-EST. PATIENT-LVL IV: CPT | Mod: PBBFAC,,,

## 2022-11-14 PROCEDURE — 1159F MED LIST DOCD IN RCRD: CPT | Mod: CPTII,,,

## 2022-11-14 PROCEDURE — 99999 PR PBB SHADOW E&M-EST. PATIENT-LVL IV: ICD-10-PCS | Mod: PBBFAC,,,

## 2022-11-14 PROCEDURE — 3079F DIAST BP 80-89 MM HG: CPT | Mod: CPTII,,,

## 2022-11-14 PROCEDURE — 99214 PR OFFICE/OUTPT VISIT, EST, LEVL IV, 30-39 MIN: ICD-10-PCS | Mod: S$PBB,,,

## 2022-11-14 PROCEDURE — 1160F PR REVIEW ALL MEDS BY PRESCRIBER/CLIN PHARMACIST DOCUMENTED: ICD-10-PCS | Mod: CPTII,,,

## 2022-11-14 PROCEDURE — 3079F PR MOST RECENT DIASTOLIC BLOOD PRESSURE 80-89 MM HG: ICD-10-PCS | Mod: CPTII,,,

## 2022-11-14 PROCEDURE — 3074F PR MOST RECENT SYSTOLIC BLOOD PRESSURE < 130 MM HG: ICD-10-PCS | Mod: CPTII,,,

## 2022-11-14 PROCEDURE — 3074F SYST BP LT 130 MM HG: CPT | Mod: CPTII,,,

## 2022-11-14 PROCEDURE — 1159F PR MEDICATION LIST DOCUMENTED IN MEDICAL RECORD: ICD-10-PCS | Mod: CPTII,,,

## 2022-11-14 PROCEDURE — 99214 OFFICE O/P EST MOD 30 MIN: CPT | Mod: PBBFAC

## 2022-11-14 NOTE — PROGRESS NOTES
Josey Flores  11/14/2022  [id number]    Kishore Persaud MD  Patient Care Team:  Kishore Persaud MD as PCP - General (Family Medicine)  Estefani Asencio MD as PCP - Hematology/Oncology (Hematology and Oncology)  Michael Kidd LMSW as   Estefani Asencio MD as Consulting Physician (Hematology and Oncology)  Rita Zamora, RN as Oncology Navigator  Delicia Ferguson LCSW as   Kalpana Ca LCSW as  (Hematology and Oncology)  Telma Kumar NP as Nurse Practitioner (Palliative Medicine)          Visit Type:an urgent visit for a new problem    Chief Complaint:  Chief Complaint   Patient presents with    Suture / Staple Removal     Top of the right foot x 3 stitches.       History of Present Illness:    Went to the ER at the end of October for laceration to her right foot  She received 3 stitches   Needs stitches removed today  Denies redness or drainage at site  No fever      History:  Past Medical History:   Diagnosis Date    Anxiety     Asthma     Breast cancer 2017    Cancer     right breast, metastatic-lymph nodes, bone, and thyroid     COPD (chronic obstructive pulmonary disease)     Depression     History of psychiatric hospitalization     2000; suicidal ideation    Hx of psychiatric care     Hypothyroidism     Miscarriage     five miscarriages    Obesity     Psychiatric problem     Thyroid cancer 2017     Past Surgical History:   Procedure Laterality Date    ABSCESS DRAINAGE      bilateral axilla    ADENOIDECTOMY      APPENDECTOMY      BREAST BIOPSY Right     x3    CHOLECYSTECTOMY      ENDOBRONCHIAL ULTRASOUND Bilateral 02/18/2021    Procedure: ENDOBRONCHIAL ULTRASOUND (EBUS);  Surgeon: Jaron Ni MD;  Location: [facility] ENDO;  Service: Pulmonary;  Laterality: Bilateral;    FLUOROSCOPY N/A 01/28/2021    Procedure: Mediport placement;  Surgeon: Noah Phelan MD;  Location: [facility] CATH LAB;  Service: General;  Laterality: N/A;    MASS EXCISION      MEDIPORT  INSERTION, SINGLE Right 02/2021    SKIN GRAFT  06/16/2017    THYROIDECTOMY Bilateral     TONSILLECTOMY       Family History   Problem Relation Age of Onset    Arthritis Mother     Early death Mother         64 at time of death    Heart attack Mother     Heart disease Mother     Heart failure Mother     Hypertension Mother     Coronary artery disease Father     Hearing loss Father     Heart disease Father     Hyperlipidemia Father     Hypertension Father     Mental illness Father     Learning disabilities Son     Cancer Maternal Aunt      Social History     Socioeconomic History    Marital status:     Number of children: 2   Tobacco Use    Smoking status: Every Day     Packs/day: 1.00     Years: 20.00     Pack years: 20.00     Types: Cigarettes     Start date: 1/1/1985    Smokeless tobacco: Never    Tobacco comments:     45 pack year history   Substance and Sexual Activity    Alcohol use: No    Drug use: No    Sexual activity: Not Currently     Partners: Male     Birth control/protection: None   Social History Narrative     with two adult children; common law marriage (10+years); raised Yarsanism, no current Christianity practice     Patient Active Problem List   Diagnosis    Post-menopausal bleeding    Malignant neoplasm of overlapping sites of right breast in female, estrogen receptor positive    Anxiety    Malignant neoplasm metastatic to lymph node of axilla    Secondary cancer of bone    COPD with asthma    Tobacco abuse disorder    Papillary thyroid carcinoma    Neoplasm related pain    Edema extremities    Psychophysiological insomnia    Branch retinal vein occlusion of left eye with macular edema    Loss of vision, left eye    HSIL (high grade squamous intraepithelial lesion) on Pap smear of cervix    ACP (advance care planning)    Urinary tract infection with hematuria    Malignant neoplasm of endocervix    Abnormal PET scan, lung    Chemotherapy-induced nausea    Hydradenitis    Elevated troponin     Hypokalemia    Hypothyroidism    Primary breast cancer with metastasis to other site    Lower abdominal pain    Acute suppurative otitis media of right ear without spontaneous rupture of tympanic membrane    Otalgia of left ear    Recurrent acute suppurative otitis media    Pruritus    Primary osteoarthritis of both knees    Chronic pain of both knees    Pancytopenia    Class 1 obesity due to excess calories with serious comorbidity and body mass index (BMI) of 31.0 to 31.9 in adult     Review of patient's allergies indicates:   Allergen Reactions    Gabapentin Swelling     Note: unilateral joint edema, unclear if due to gabapentin    Nsaids (non-steroidal anti-inflammatory drug) Other (See Comments)     Instructed not to take NSAID drugs with chemo pill.    Clindamycin Rash    Vancomycin analogues Itching       The following were reviewed at this visit: active problem list, medication list, allergies, family history, social history, and health maintenance.    Medications:  Current Outpatient Medications on File Prior to Visit   Medication Sig Dispense Refill    albuterol (PROVENTIL/VENTOLIN HFA) 90 mcg/actuation inhaler Inhale 2 puffs into the lungs every 4 (four) hours as needed for Wheezing or Shortness of Breath. 18 g 11    ALPRAZolam (XANAX) 1 MG tablet Take 0.5-1 tablets (0.5-1 mg total) by mouth 2 (two) times daily as needed for Anxiety. 60 tablet 0    buPROPion (WELLBUTRIN SR) 150 MG TBSR 12 hr tablet Take 1 tablet (150 mg total) by mouth 2 (two) times daily. Take 1 tablet (150mg) once daily for 1 week then increase to twice daily 60 tablet 0    calcium carbonate 400 mg calcium (1,000 mg) Chew Take 2,000 mg by mouth once daily.      cyanocobalamin (VITAMIN B-12) 1000 MCG tablet Take 1 tablet (1,000 mcg total) by mouth once daily. 90 tablet 3    diphenoxylate-atropine 2.5-0.025 mg (LOMOTIL) 2.5-0.025 mg per tablet Take 1 tablet by mouth 4 (four) times daily as needed for Diarrhea. 30 tablet 1     ergocalciferol (VITAMIN D2) 50,000 unit Cap Take 5,000 Units by mouth once daily at 6am.       HYDROcodone-acetaminophen (NORCO)  mg per tablet Take 1 tablet by mouth every 4 (four) hours as needed for Pain. No more than 4 doses/ day 120 tablet 0    hydrOXYzine HCL (ATARAX) 25 MG tablet Take 1 tablet (25 mg total) by mouth 3 (three) times daily as needed for Itching. 90 tablet 1    ipratropium (ATROVENT) 42 mcg (0.06 %) nasal spray 2 sprays by Nasal route 4 (four) times daily. As needed for rhinitis. Take in evening before bed and in the morning 15 mL 11    letrozole (FEMARA) 2.5 mg Tab Take 1 tablet (2.5 mg total) by mouth once daily. 30 tablet 2    levothyroxine (SYNTHROID) 200 MCG tablet Take 1 tablet (200 mcg total) by mouth once daily. 30 tablet 11    levothyroxine (SYNTHROID) 75 MCG tablet Take 1 tablet (75 mcg total) by mouth before breakfast. 30 tablet 11    morphine (MS CONTIN) 30 MG 12 hr tablet Take 1 tablet (30 mg total) by mouth 2 (two) times daily. 60 tablet 0    multivitamin (THERAGRAN) per tablet Take 1 tablet by mouth once daily.      naloxone (NARCAN) 4 mg/actuation Spry SMARTSIG:Both Nares      palbociclib (IBRANCE) 125 mg Cap Take one capsule (125 mg) by mouth once daily on days 1-21 of each 28-day cycle. 21 capsule 11    potassium chloride SA (K-DUR,KLOR-CON) 20 MEQ tablet Take 1 tablet (20 mEq total) by mouth once daily. Take daily for 3 days. 3 tablet 1    [DISCONTINUED] OLANZapine (ZYPREXA) 5 MG tablet Take 1 tablet (5 mg total) by mouth every evening. 30 tablet 2     No current facility-administered medications on file prior to visit.       Medications have been reviewed and reconciled with patient at this visit.  Barriers to medications reviewed with patient.    Adverse reactions to current medications reviewed with patient..    Over the counter medications reviewed and reconciled with patient.    Exam:  Wt Readings from Last 3 Encounters:   11/14/22 90 kg (198 lb 6.4 oz)   10/20/22  91.3 kg (201 lb 4.5 oz)   10/19/22 90.4 kg (199 lb 3 oz)     Temp Readings from Last 3 Encounters:   11/14/22 98.5 °F (36.9 °C)   10/20/22 97.7 °F (36.5 °C) (Temporal)   10/06/22 97 °F (36.1 °C)     BP Readings from Last 3 Encounters:   11/14/22 114/80   10/19/22 128/66   10/06/22 109/76     Pulse Readings from Last 3 Encounters:   11/14/22 88   10/06/22 82   10/03/22 77     Body mass index is 31.07 kg/m².      Review of Systems   Constitutional:  Negative for chills, fever and malaise/fatigue.   Cardiovascular:  Negative for chest pain and palpitations.   Physical Exam  Vitals and nursing note reviewed.   Constitutional:       General: She is not in acute distress.     Appearance: She is well-developed. She is obese. She is not diaphoretic.   HENT:      Head: Normocephalic and atraumatic.      Right Ear: External ear normal.      Left Ear: External ear normal.   Eyes:      General:         Right eye: No discharge.         Left eye: No discharge.      Conjunctiva/sclera: Conjunctivae normal.      Pupils: Pupils are equal, round, and reactive to light.   Cardiovascular:      Rate and Rhythm: Normal rate and regular rhythm.      Heart sounds: Normal heart sounds. No murmur heard.  Pulmonary:      Effort: Pulmonary effort is normal. No respiratory distress.      Breath sounds: Examination of the right-lower field reveals decreased breath sounds. Examination of the left-lower field reveals decreased breath sounds. Decreased breath sounds present. No wheezing.   Skin:     Findings: Bruising present.   Neurological:      Mental Status: She is alert and oriented to person, place, and time.   Psychiatric:         Behavior: Behavior normal.         Thought Content: Thought content normal.         Judgment: Judgment normal.       Laboratory Reviewed ({Yes)  Lab Results   Component Value Date    WBC 5.39 10/03/2022    HGB 11.3 (L) 10/03/2022    HCT 33.7 (L) 10/03/2022     10/03/2022    CHOL 218 (H) 05/18/2021    TRIG  174 (H) 05/18/2021    HDL 39 (L) 05/18/2021    ALT 5 (L) 10/03/2022    AST 10 10/03/2022     10/03/2022    K 4.0 10/03/2022     10/03/2022    CREATININE 1.5 (H) 10/03/2022    BUN 11 10/03/2022    CO2 25 10/03/2022    TSH 22.155 (H) 10/03/2022    INR 0.9 05/18/2021    HGBA1C 5.4 05/18/2021         Josey was seen today for suture / staple removal.    Diagnoses and all orders for this visit:    Laceration of right foot, initial encounter    Obesity (BMI 30-39.9)  Encouraged healthy diet and exercise as tolerated to help bring BMI into normal range.      Primary malignant neoplasm of breast with metastasis to other site, unspecified laterality  Being followed by oncology and gyn oncology. Cont POC    Recurrent cervical cancer  Being followed by oncology and gyn oncology. Cont POC    Stitches were removed during office visit  Discussed keeping area clean and dry. Using neosporin daily  Will call if developed any complications       Care Plan/Goals: Reviewed    Goals    None         Follow up: No follow-ups on file.    After visit summary was printed and given to patient upon discharge today.  Patient goals and care plan are included in After Visit Summary.

## 2022-11-29 ENCOUNTER — DOCUMENTATION ONLY (OUTPATIENT)
Dept: PALLIATIVE MEDICINE | Facility: CLINIC | Age: 52
End: 2022-11-29
Payer: MEDICAID

## 2022-11-29 NOTE — PROGRESS NOTES
Nurse reached out to pt to offer an apt today no answer, LVM to call office for 2pm apt today, if pt would like to come in today oppose to Thursday.

## 2022-12-01 ENCOUNTER — OFFICE VISIT (OUTPATIENT)
Dept: PALLIATIVE MEDICINE | Facility: CLINIC | Age: 52
End: 2022-12-01
Payer: MEDICAID

## 2022-12-01 ENCOUNTER — TELEPHONE (OUTPATIENT)
Dept: OTOLARYNGOLOGY | Facility: CLINIC | Age: 52
End: 2022-12-01
Payer: MEDICAID

## 2022-12-01 VITALS
SYSTOLIC BLOOD PRESSURE: 128 MMHG | TEMPERATURE: 97 F | WEIGHT: 200.38 LBS | BODY MASS INDEX: 31.45 KG/M2 | HEIGHT: 67 IN | RESPIRATION RATE: 18 BRPM | HEART RATE: 92 BPM | DIASTOLIC BLOOD PRESSURE: 81 MMHG

## 2022-12-01 DIAGNOSIS — G89.3 NEOPLASM RELATED PAIN: Primary | ICD-10-CM

## 2022-12-01 DIAGNOSIS — Z51.5 ENCOUNTER FOR PALLIATIVE CARE: ICD-10-CM

## 2022-12-01 DIAGNOSIS — C50.811 MALIGNANT NEOPLASM OF OVERLAPPING SITES OF RIGHT BREAST IN FEMALE, ESTROGEN RECEPTOR POSITIVE: ICD-10-CM

## 2022-12-01 DIAGNOSIS — Z17.0 MALIGNANT NEOPLASM OF OVERLAPPING SITES OF RIGHT BREAST IN FEMALE, ESTROGEN RECEPTOR POSITIVE: ICD-10-CM

## 2022-12-01 DIAGNOSIS — F41.9 ANXIETY: ICD-10-CM

## 2022-12-01 DIAGNOSIS — C53.0 MALIGNANT NEOPLASM OF ENDOCERVIX: ICD-10-CM

## 2022-12-01 PROCEDURE — 99214 OFFICE O/P EST MOD 30 MIN: CPT | Mod: S$PBB,,, | Performed by: NURSE PRACTITIONER

## 2022-12-01 PROCEDURE — 3074F PR MOST RECENT SYSTOLIC BLOOD PRESSURE < 130 MM HG: ICD-10-PCS | Mod: CPTII,,, | Performed by: NURSE PRACTITIONER

## 2022-12-01 PROCEDURE — 3008F BODY MASS INDEX DOCD: CPT | Mod: CPTII,,, | Performed by: NURSE PRACTITIONER

## 2022-12-01 PROCEDURE — 3008F PR BODY MASS INDEX (BMI) DOCUMENTED: ICD-10-PCS | Mod: CPTII,,, | Performed by: NURSE PRACTITIONER

## 2022-12-01 PROCEDURE — 1159F PR MEDICATION LIST DOCUMENTED IN MEDICAL RECORD: ICD-10-PCS | Mod: CPTII,,, | Performed by: NURSE PRACTITIONER

## 2022-12-01 PROCEDURE — 3079F DIAST BP 80-89 MM HG: CPT | Mod: CPTII,,, | Performed by: NURSE PRACTITIONER

## 2022-12-01 PROCEDURE — 1159F MED LIST DOCD IN RCRD: CPT | Mod: CPTII,,, | Performed by: NURSE PRACTITIONER

## 2022-12-01 PROCEDURE — 99999 PR PBB SHADOW E&M-EST. PATIENT-LVL IV: ICD-10-PCS | Mod: PBBFAC,,, | Performed by: NURSE PRACTITIONER

## 2022-12-01 PROCEDURE — 3079F PR MOST RECENT DIASTOLIC BLOOD PRESSURE 80-89 MM HG: ICD-10-PCS | Mod: CPTII,,, | Performed by: NURSE PRACTITIONER

## 2022-12-01 PROCEDURE — 99214 OFFICE O/P EST MOD 30 MIN: CPT | Mod: PBBFAC | Performed by: NURSE PRACTITIONER

## 2022-12-01 PROCEDURE — 99999 PR PBB SHADOW E&M-EST. PATIENT-LVL IV: CPT | Mod: PBBFAC,,, | Performed by: NURSE PRACTITIONER

## 2022-12-01 PROCEDURE — 3074F SYST BP LT 130 MM HG: CPT | Mod: CPTII,,, | Performed by: NURSE PRACTITIONER

## 2022-12-01 PROCEDURE — 99214 PR OFFICE/OUTPT VISIT, EST, LEVL IV, 30-39 MIN: ICD-10-PCS | Mod: S$PBB,,, | Performed by: NURSE PRACTITIONER

## 2022-12-01 NOTE — TELEPHONE ENCOUNTER
Returned patients call - left voicemail. Patient was requesting an appointment to address otalgia. Dr. Wu is overbooked today and is unable to add anyone else at this time. Patient should follow up with palliative care today, as scheduled.

## 2022-12-02 NOTE — PROGRESS NOTES
Palliative Medicine         Follow up    SUBJECTIVE:   Chief complaint: pain follow up    12/02/2022   Patient comes to clinic with her significant other Alessio. She is still feeling sad because of her grand daughter since she moved out. She noted that she stopped taking her Wellbutrin but wants to start back taking it to help with her mood also to help her with the process of quitting smoking. Also she notes she has been dealing with worsening pain to her left ear. She saw Otolaryngologist recently and he noted no signs of changes on CT and offered patient surgery or non surgical management. Patient notes that she did not want surgery at that time because her doctor made her aware that the procedure may help but there is a possibility that it may not. She notes that the pain and drainage has worsened and she is now thinking about proceeding with surgery even if there is a risk that it will not work. She has been in touch with her Otolaryngologist's office to schedule appointment. She last saw Dr. Asencio 10/13/22 in which there were no changes and she was to continue on current treatment withLetrozole and palbociclib. Her visit with Gyn/onc also noted no new changes and everything was stable. During exam patient reports pelvic pain but notes that medication has been effective in managing it. Being that there were no changes with all of her MD visits, I spoke with patient about continuing plan from her previous PM visit with weaning MSER. She is in agreement, we will wean down to MSER 15mg po BID. I did make patient aware that medications provided by me were not for her ear pain or or acute issues she may experience but it is indicated for malignant pain. I reinforced education about taking medications as prescribed and for what it is indicated for. I made her aware that we could not send in another early prescription as my counterpart had to do in November. On 11/1/22,  patient sent a message noting she ran out of  her medication before scheduled visit. She reported that she was admitted to the [facility]) due to abdominal pain and then when she was discharged she cut her foot very badly and had to receive stiches. Due to these issues, she used her medications more frequently than she was prescribed. She was taking 2 tabs of MSER 30mg BID instead of 1 BID. She was taking hydrocodone 4-5x/ day. She admitted that she was not supposed to do this and will not do this again. She was hoping that provider would write the 15mg ER tabs to hold her over. She explained why she would not write for the 15mg and typically do not write early refills. This is the second time that she has taken ER medications more frequently than prescribed. She was counseled gain on the risks with this behavior and this would be her last warning as well as last early refill. Moving forward if she takes opioids other than rx this is grounds for dismissal from our clinic given issues with impulse control and safety. She expressed understanding and said that her  has also scolded her. She knows this was not a good choice and continues to apologize and promised she will not do it again. My partner explained our boundaries in clinic are not punitive but rather in place for her safety. I was present with my counterpart for the conversation and agreed that early refills will be not be permitted moving forward.    Past visits:  10/06/2022  She comes to clinic with DEYSI Mccallum today. She is down because her granddaughter is not in a good situation but otherwise feeling okay. Since we last saw each other her pain has improved which is likely related to the positive response to treatment. She would like to begin weaning the MSER back to 30mg BID and hopefully continue to decrease further. She is still taking hydrocodone 10mg QID with good response. Her mood improved with the resumption of Wellbutrin and is still cutting back on her tobacco use. She has been  "having ear pain which is being evaluated by ENT and is waiting to hear when her CT is scheduled. There seems to have been a charting error with her Letrozole resulting in d/c the order and unable to refill. I will reach out to oncology to reorder. We will follow up in 2 months and she will message me for refills whether she would like to continue weaning before then. We do not have HCPOA on file even though she remembers bringing to clinic. She will bring again next visit and confirms that her sister David is HCPOA.    09/01/22: Ms. Flores comes to clinic today with her yi Mccallum. She reports that her lower abdominal pain has worsened since brachytherapy started several weeks ago. She reports spontaneous lower abdominal pain not related to food but sometimes exacerbated by BM. She denies constipation. She has taken an extra dose of MSER at bedtime to help with sleep. We talked about the risks associated with this and would advise against doing this again. She is taking hydrocodone q4hr sometimes waking up at night and needing another dose averaging 4-6 in a day. I recommend we increase the long acting dose in hopes for better control and less PRN usage. She says her sleep is poor because this is when the pain intensifies (lying flat) but also anxiety and sadness since her granddaughter moved out of the home. She is taking Xanax HS which helps. She was taking Wellbutrin in the last but not sure why she stopped. For now lets not resume this and evaluate at her next visit.    06/16/2022: Patient doing okay.  No new issues since her last visit.  She continue to have some lower GI pains describes now as "cramping after a BM".  Previously, her discomfort was after eating.  She has been referred to GI.  She has some fatigue but managing.  Pain is controlled on current regimen.  She has upcoming scans and appointment with Dr. Asencio.  She has some anxiety and life stressors affecting her situation.  Her son and his " ex-girfriend (now in [place]) can no longer care for their 4 year old daughter.  She and her partner are assuming the parental rights of her granddaughter.  They seem to be coping as well as can be expected.  Overall, she is doing okay.  We will continue to follow at quarterly intervals.  She knows to reach out if her situation changes or symptom burden worsens.    03/10/2022: Overall, patient seems to be doing okay.  She has some new GI issues with abdominal pain after eating and intermittent diarrhea not felt to be related to her disease or treatment.  Oncology discussed GI referral and she is considering this if no improvement.  We are adjusting her pain medication regimen today mostly to cut back on her prn Norco which she continues to use consistently at 5-6 tablets/day.  She knows she can reach out for additional questions or concerns.     01/06/2022: Patient is a 51 y.o. year old female presenting with for palliative follow up for her metastatic breast cancer, SCC of the cervix. Treatments currently on hold due to recurrent ear infection. She is anxious about results of her recent scan and fearful of disease progression while off treatment.  Otherwise, her pain continues to be controlled on her current regimen.  No new symptoms or complaints today.    ONCOLOGY DIAGNOSES:  Stage IV cervical squamous cell carcinoma   stage IV, T2 N1b M1, invasive breast carcinoma, ER/IL+, HER2-   Papillary thyroid carcinoma status post total thyroidectomy on 08/31/2017, mpT1b N0    Cervical HSIL MIREILLE 3 s/p LEEP, 2017    Patient looks and feels better compared to last visit.  She walked into her appt.  Ear is better and she is being followed by ENT.  She has resumed treatment for both her cervical and breast cancer and is followed closely by medical oncology and gyn oncology.  Pain continues to be controlled on current medication regimen.  She is having knee issues with swelling and likely OA.  We discussed referral to orthopedics.   She also plans to follow up with neurosurgery.  Overall, no major changes and no new issues.     JEANETTE GANNON reviewed and summarized:      OBJECTIVE:     ROS:  Review of Systems   Constitutional:  Positive for fatigue. Negative for activity change, appetite change, chills and fever.   HENT:  Negative for mouth sores, sore throat and trouble swallowing.    Respiratory:  Negative for cough and shortness of breath.    Gastrointestinal:  Positive for abdominal pain. Negative for abdominal distention, constipation, diarrhea, nausea and vomiting.   Genitourinary:  Positive for pelvic pain. Negative for difficulty urinating and dysuria.   Musculoskeletal:  Positive for back pain. Negative for myalgias.   Skin:  Negative for rash and wound.   Allergic/Immunologic: Negative for immunocompromised state.   Neurological:  Negative for weakness and headaches.   Psychiatric/Behavioral:  Positive for sleep disturbance. Negative for confusion, decreased concentration and dysphoric mood. The patient is nervous/anxious.      Review of Symptoms      Symptom Assessment (ESAS 0-10 Scale)  Pain:  0  Dyspnea:  0  Anxiety:  0  Nausea:  0  Depression:  0  Anorexia:  0  Fatigue:  0  Insomnia:  0  Restlessness:  0  Agitation:  0     CAM / Delirium:  Negative  Constipation:  Negative  Diarrhea:  Negative    Anxiety:  Is nervous/anxious  Constipation:  No constipation    Bowel Management Plan (BMP):  Yes      Pain Assessment:    Location(s): ear    Ear       Location: left        Quality: None        Quantity: 8/10 in intensity        Chronicity: Onset 4 week(s) ago, unchanged        Aggravating Factors: none        Alleviating Factors: opiates        Associated Symptoms: none    ECOG Performance Status stGstrstastdstest:st st1st Living Arrangements:  Lives with spouse and Lives in home    Psychosocial/Cultural: Lives with her significant other of 16 years: they have 3 children combined, 2 sons from previous marriage. Granddaughter recently moved out of the  home    Advance Care Planning   Advance Directives:   Living Will: Yes        Copy on chart: Yes    LaPOST: Yes    Do Not Resuscitate Status: Yes    Agent's Name:  David Acevedo, sister, [phone]    Decision Making:  Patient answered questions  Goals of Care: What is most important right now is to focus on continuing with treatment and symptom management. Accordingly, we have decided that the best plan to meet the patient's goals includes continuing with treatment.        Physical Exam:  Vitals: Temp: 97.3 °F (36.3 °C) (12/01/22 1144)  Pulse: 92 (12/01/22 1144)  Resp: 18 (12/01/22 1144)  BP: 128/81 (12/01/22 1144)  Physical Exam  Vitals and nursing note reviewed.   Constitutional:       General: She is not in acute distress.     Appearance: Normal appearance. She is obese. She is ill-appearing (chronic).   HENT:      Head: Normocephalic and atraumatic.   Eyes:      Pupils: Pupils are equal, round, and reactive to light.   Cardiovascular:      Rate and Rhythm: Normal rate and regular rhythm.      Pulses: Normal pulses.      Heart sounds: Normal heart sounds. No murmur heard.  Pulmonary:      Effort: Pulmonary effort is normal. No respiratory distress.      Breath sounds: Normal breath sounds.   Abdominal:      General: Bowel sounds are normal. There is no distension.      Palpations: Abdomen is soft.      Tenderness: There is no abdominal tenderness.   Musculoskeletal:         General: Normal range of motion.      Cervical back: Normal range of motion.      Right lower leg: No edema.      Left lower leg: No edema.   Skin:     General: Skin is warm and dry.   Neurological:      Mental Status: She is alert and oriented to person, place, and time. Mental status is at baseline.      Cranial Nerves: No cranial nerve deficit.   Psychiatric:         Mood and Affect: Mood normal.         Behavior: Behavior normal.         Thought Content: Thought content normal.         Judgment: Judgment normal.       Radiology and  laboratory data reviewed    ASSESSMENT/PLAN:   **Neoplasm related pain   -Begin weaning MSER down to 15mg BID since disease is improving and pain is too  -Continue hydrocodone 10mg QID PRN    Pain contract- 9/1/22  UDS collected- 10/6/22  Narcan rx- 9/1/22    **Malignant neoplasm of overlapping sites of right breast in female, estrogen receptor positive  -Followed by Dr. Asencio  -Current treatment: palbociclib and letrozole    **Malignant neoplasm of endocervix  -Restaging PET scan with diminished avidity vaginal cuff status post brachytherapy  -Holding further maintenance immunotherapy at this time given no evidence of recurrent / progressive metastatic disease       **Anxiety  -Continue Xanax 0.5mg-1mg BID PRN    **Palliative Care  -ACP (advance care planning)  -DNR; LaPOST completed and uploaded in EMR  -Surrogate:  David Acevedo, sister, at [phone]. She will bring copy of HCPOA at next visit. Unfortunately this has not been scanned in when she brought it in the past.     Follow up: 2 months    35 minutes of total time spent on the encounter, which includes face to face time and non-face to face time preparing to see the patient (eg, review of tests), Obtaining and/or reviewing separately obtained history, Documenting clinical information in the electronic or other health record, Independently interpreting results (not separately reported) and communicating results to the patient/family/caregiver, or Care coordination (not separately reported).      Signature: Georgie Johnson NP

## 2022-12-06 ENCOUNTER — PATIENT MESSAGE (OUTPATIENT)
Dept: PALLIATIVE MEDICINE | Facility: CLINIC | Age: 52
End: 2022-12-06
Payer: MEDICAID

## 2022-12-06 DIAGNOSIS — G89.3 NEOPLASM RELATED PAIN: ICD-10-CM

## 2022-12-06 DIAGNOSIS — Z72.0 TOBACCO ABUSE: ICD-10-CM

## 2022-12-06 DIAGNOSIS — F41.9 ANXIETY: ICD-10-CM

## 2022-12-06 RX ORDER — HYDROCODONE BITARTRATE AND ACETAMINOPHEN 10; 325 MG/1; MG/1
1 TABLET ORAL EVERY 4 HOURS PRN
Qty: 120 TABLET | Refills: 0 | Status: SHIPPED | OUTPATIENT
Start: 2022-12-06 | End: 2023-01-06 | Stop reason: SDUPTHER

## 2022-12-06 RX ORDER — ALPRAZOLAM 1 MG/1
.5-1 TABLET ORAL 2 TIMES DAILY PRN
Qty: 60 TABLET | Refills: 0 | Status: SHIPPED | OUTPATIENT
Start: 2022-12-06 | End: 2023-01-06 | Stop reason: SDUPTHER

## 2022-12-06 RX ORDER — MORPHINE SULFATE 15 MG/1
15 TABLET, FILM COATED, EXTENDED RELEASE ORAL 2 TIMES DAILY
Qty: 60 TABLET | Refills: 0 | Status: SHIPPED | OUTPATIENT
Start: 2022-12-06 | End: 2023-01-06 | Stop reason: SDUPTHER

## 2022-12-07 DIAGNOSIS — Z72.0 TOBACCO ABUSE: ICD-10-CM

## 2022-12-08 DIAGNOSIS — Z72.0 TOBACCO ABUSE: ICD-10-CM

## 2022-12-08 RX ORDER — BUPROPION HYDROCHLORIDE 150 MG/1
150 TABLET, EXTENDED RELEASE ORAL 2 TIMES DAILY
Qty: 60 TABLET | Refills: 0 | Status: SHIPPED | OUTPATIENT
Start: 2022-12-08 | End: 2023-10-31 | Stop reason: ALTCHOICE

## 2022-12-09 RX ORDER — BUPROPION HYDROCHLORIDE 150 MG/1
150 TABLET, EXTENDED RELEASE ORAL 2 TIMES DAILY
Qty: 60 TABLET | Refills: 0 | OUTPATIENT
Start: 2022-12-09 | End: 2023-12-09

## 2022-12-12 ENCOUNTER — OFFICE VISIT (OUTPATIENT)
Dept: HEMATOLOGY/ONCOLOGY | Facility: CLINIC | Age: 52
End: 2022-12-12
Payer: MEDICAID

## 2022-12-12 ENCOUNTER — INFUSION (OUTPATIENT)
Dept: INFUSION THERAPY | Facility: HOSPITAL | Age: 52
End: 2022-12-12
Attending: INTERNAL MEDICINE
Payer: MEDICAID

## 2022-12-12 ENCOUNTER — LAB VISIT (OUTPATIENT)
Dept: LAB | Facility: HOSPITAL | Age: 52
End: 2022-12-12
Attending: INTERNAL MEDICINE
Payer: MEDICAID

## 2022-12-12 ENCOUNTER — PATIENT MESSAGE (OUTPATIENT)
Dept: PHARMACY | Facility: CLINIC | Age: 52
End: 2022-12-12
Payer: MEDICAID

## 2022-12-12 VITALS
OXYGEN SATURATION: 95 % | RESPIRATION RATE: 16 BRPM | SYSTOLIC BLOOD PRESSURE: 115 MMHG | TEMPERATURE: 98 F | DIASTOLIC BLOOD PRESSURE: 58 MMHG | HEART RATE: 79 BPM

## 2022-12-12 VITALS
BODY MASS INDEX: 31.94 KG/M2 | HEIGHT: 67 IN | WEIGHT: 203.5 LBS | SYSTOLIC BLOOD PRESSURE: 97 MMHG | HEART RATE: 80 BPM | DIASTOLIC BLOOD PRESSURE: 69 MMHG | TEMPERATURE: 97 F | OXYGEN SATURATION: 94 %

## 2022-12-12 DIAGNOSIS — C79.51 SECONDARY CANCER OF BONE: ICD-10-CM

## 2022-12-12 DIAGNOSIS — L29.9 PRURITUS: Primary | ICD-10-CM

## 2022-12-12 DIAGNOSIS — R11.0 CHEMOTHERAPY-INDUCED NAUSEA: ICD-10-CM

## 2022-12-12 DIAGNOSIS — E03.9 HYPOTHYROIDISM, UNSPECIFIED TYPE: ICD-10-CM

## 2022-12-12 DIAGNOSIS — Z17.0 MALIGNANT NEOPLASM OF OVERLAPPING SITES OF RIGHT BREAST IN FEMALE, ESTROGEN RECEPTOR POSITIVE: ICD-10-CM

## 2022-12-12 DIAGNOSIS — C53.0 MALIGNANT NEOPLASM OF ENDOCERVIX: ICD-10-CM

## 2022-12-12 DIAGNOSIS — C77.3 MALIGNANT NEOPLASM METASTATIC TO LYMPH NODE OF AXILLA: ICD-10-CM

## 2022-12-12 DIAGNOSIS — H92.02 LEFT EAR PAIN: Primary | ICD-10-CM

## 2022-12-12 DIAGNOSIS — T45.1X5A CHEMOTHERAPY-INDUCED NAUSEA: ICD-10-CM

## 2022-12-12 DIAGNOSIS — C50.811 MALIGNANT NEOPLASM OF OVERLAPPING SITES OF RIGHT BREAST IN FEMALE, ESTROGEN RECEPTOR POSITIVE: ICD-10-CM

## 2022-12-12 LAB
ALBUMIN SERPL BCP-MCNC: 3.7 G/DL (ref 3.5–5.2)
ALP SERPL-CCNC: 63 U/L (ref 55–135)
ALT SERPL W/O P-5'-P-CCNC: 18 U/L (ref 10–44)
ANION GAP SERPL CALC-SCNC: 12 MMOL/L (ref 8–16)
AST SERPL-CCNC: 17 U/L (ref 10–40)
BASOPHILS # BLD AUTO: 0.08 K/UL (ref 0–0.2)
BASOPHILS NFR BLD: 1.2 % (ref 0–1.9)
BILIRUB SERPL-MCNC: 0.3 MG/DL (ref 0.1–1)
BUN SERPL-MCNC: 8 MG/DL (ref 6–20)
CALCIUM SERPL-MCNC: 9.6 MG/DL (ref 8.7–10.5)
CHLORIDE SERPL-SCNC: 105 MMOL/L (ref 95–110)
CO2 SERPL-SCNC: 25 MMOL/L (ref 23–29)
CREAT SERPL-MCNC: 1.3 MG/DL (ref 0.5–1.4)
DIFFERENTIAL METHOD: ABNORMAL
EOSINOPHIL # BLD AUTO: 0.4 K/UL (ref 0–0.5)
EOSINOPHIL NFR BLD: 6.3 % (ref 0–8)
ERYTHROCYTE [DISTWIDTH] IN BLOOD BY AUTOMATED COUNT: 13.9 % (ref 11.5–14.5)
EST. GFR  (NO RACE VARIABLE): 49 ML/MIN/1.73 M^2
GLUCOSE SERPL-MCNC: 117 MG/DL (ref 70–110)
HCT VFR BLD AUTO: 36.8 % (ref 37–48.5)
HGB BLD-MCNC: 12.1 G/DL (ref 12–16)
IMM GRANULOCYTES # BLD AUTO: 0.16 K/UL (ref 0–0.04)
IMM GRANULOCYTES NFR BLD AUTO: 2.3 % (ref 0–0.5)
LYMPHOCYTES # BLD AUTO: 2.2 K/UL (ref 1–4.8)
LYMPHOCYTES NFR BLD: 32 % (ref 18–48)
MCH RBC QN AUTO: 37 PG (ref 27–31)
MCHC RBC AUTO-ENTMCNC: 32.9 G/DL (ref 32–36)
MCV RBC AUTO: 113 FL (ref 82–98)
MONOCYTES # BLD AUTO: 0.6 K/UL (ref 0.3–1)
MONOCYTES NFR BLD: 8.9 % (ref 4–15)
NEUTROPHILS # BLD AUTO: 3.4 K/UL (ref 1.8–7.7)
NEUTROPHILS NFR BLD: 49.3 % (ref 38–73)
NRBC BLD-RTO: 0 /100 WBC
PLATELET # BLD AUTO: 267 K/UL (ref 150–450)
PMV BLD AUTO: 11 FL (ref 9.2–12.9)
POTASSIUM SERPL-SCNC: 4.4 MMOL/L (ref 3.5–5.1)
PROT SERPL-MCNC: 7.1 G/DL (ref 6–8.4)
RBC # BLD AUTO: 3.27 M/UL (ref 4–5.4)
SODIUM SERPL-SCNC: 142 MMOL/L (ref 136–145)
T4 FREE SERPL-MCNC: 0.62 NG/DL (ref 0.71–1.51)
TSH SERPL DL<=0.005 MIU/L-ACNC: 41.02 UIU/ML (ref 0.4–4)
WBC # BLD AUTO: 6.87 K/UL (ref 3.9–12.7)

## 2022-12-12 PROCEDURE — 3078F DIAST BP <80 MM HG: CPT | Mod: CPTII,,, | Performed by: INTERNAL MEDICINE

## 2022-12-12 PROCEDURE — 3078F PR MOST RECENT DIASTOLIC BLOOD PRESSURE < 80 MM HG: ICD-10-PCS | Mod: CPTII,,, | Performed by: INTERNAL MEDICINE

## 2022-12-12 PROCEDURE — A4216 STERILE WATER/SALINE, 10 ML: HCPCS | Performed by: INTERNAL MEDICINE

## 2022-12-12 PROCEDURE — 84439 ASSAY OF FREE THYROXINE: CPT | Performed by: INTERNAL MEDICINE

## 2022-12-12 PROCEDURE — 84443 ASSAY THYROID STIM HORMONE: CPT | Performed by: INTERNAL MEDICINE

## 2022-12-12 PROCEDURE — 3008F BODY MASS INDEX DOCD: CPT | Mod: CPTII,,, | Performed by: INTERNAL MEDICINE

## 2022-12-12 PROCEDURE — 80053 COMPREHEN METABOLIC PANEL: CPT | Performed by: INTERNAL MEDICINE

## 2022-12-12 PROCEDURE — 99215 OFFICE O/P EST HI 40 MIN: CPT | Mod: S$PBB,,, | Performed by: INTERNAL MEDICINE

## 2022-12-12 PROCEDURE — 36415 COLL VENOUS BLD VENIPUNCTURE: CPT | Performed by: INTERNAL MEDICINE

## 2022-12-12 PROCEDURE — 99999 PR PBB SHADOW E&M-EST. PATIENT-LVL IV: CPT | Mod: PBBFAC,,, | Performed by: INTERNAL MEDICINE

## 2022-12-12 PROCEDURE — 3074F SYST BP LT 130 MM HG: CPT | Mod: CPTII,,, | Performed by: INTERNAL MEDICINE

## 2022-12-12 PROCEDURE — 25000003 PHARM REV CODE 250: Performed by: INTERNAL MEDICINE

## 2022-12-12 PROCEDURE — 96523 IRRIG DRUG DELIVERY DEVICE: CPT

## 2022-12-12 PROCEDURE — 99215 PR OFFICE/OUTPT VISIT, EST, LEVL V, 40-54 MIN: ICD-10-PCS | Mod: S$PBB,,, | Performed by: INTERNAL MEDICINE

## 2022-12-12 PROCEDURE — 3074F PR MOST RECENT SYSTOLIC BLOOD PRESSURE < 130 MM HG: ICD-10-PCS | Mod: CPTII,,, | Performed by: INTERNAL MEDICINE

## 2022-12-12 PROCEDURE — 63600175 PHARM REV CODE 636 W HCPCS: Performed by: INTERNAL MEDICINE

## 2022-12-12 PROCEDURE — 99999 PR PBB SHADOW E&M-EST. PATIENT-LVL IV: ICD-10-PCS | Mod: PBBFAC,,, | Performed by: INTERNAL MEDICINE

## 2022-12-12 PROCEDURE — 99214 OFFICE O/P EST MOD 30 MIN: CPT | Mod: PBBFAC | Performed by: INTERNAL MEDICINE

## 2022-12-12 PROCEDURE — 3008F PR BODY MASS INDEX (BMI) DOCUMENTED: ICD-10-PCS | Mod: CPTII,,, | Performed by: INTERNAL MEDICINE

## 2022-12-12 PROCEDURE — 85025 COMPLETE CBC W/AUTO DIFF WBC: CPT | Performed by: INTERNAL MEDICINE

## 2022-12-12 RX ORDER — SODIUM CHLORIDE 0.9 % (FLUSH) 0.9 %
10 SYRINGE (ML) INJECTION
Status: CANCELLED | OUTPATIENT
Start: 2022-12-12

## 2022-12-12 RX ORDER — SODIUM CHLORIDE 0.9 % (FLUSH) 0.9 %
10 SYRINGE (ML) INJECTION
Status: DISCONTINUED | OUTPATIENT
Start: 2022-12-12 | End: 2022-12-12 | Stop reason: HOSPADM

## 2022-12-12 RX ORDER — HEPARIN 100 UNIT/ML
500 SYRINGE INTRAVENOUS
Status: DISCONTINUED | OUTPATIENT
Start: 2022-12-12 | End: 2022-12-12 | Stop reason: HOSPADM

## 2022-12-12 RX ORDER — HEPARIN 100 UNIT/ML
500 SYRINGE INTRAVENOUS
Status: CANCELLED | OUTPATIENT
Start: 2022-12-12

## 2022-12-12 RX ADMIN — HEPARIN 500 UNITS: 100 SYRINGE at 03:12

## 2022-12-12 RX ADMIN — SODIUM CHLORIDE, PRESERVATIVE FREE 10 ML: 5 INJECTION INTRAVENOUS at 03:12

## 2022-12-12 NOTE — PROGRESS NOTES
Subjective:      DATE OF VISIT: 12/12/2022   ?   Patient ID:?Josey Flores is a 52 y.o. female.?? MR#: [id number]   ?   PRIMARY PROVIDER: Dr. Asencio  ?   CHIEF COMPLAINT:  Follow-up  ?   ONCOLOGIC DIAGNOSIS:      Stage IV cervical squamous cell carcinoma    stage IV, T2 N1b M1, invasive breast carcinoma, ER/TX+, HER2-    Papillary thyroid carcinoma status post total thyroidectomy on 08/31/2017, mpT1b N0     Cervical HSIL MIREILLE 3 s/p LEEP, 2017  ?   CURRENT TREATMENT:    Letrozole and palbociclib    PAST TREATMENT:    Palliative chemotherapy: cisplatin, paclitaxel and bevacizumab; 02/22/2021 cycle 1 day 1 -> bevacizumab maintenance after 6 cycles  Carboplatin, paclitaxel and pembrolizumab, C1D1 1/28/22; maintenance pembrolizumab, d/c 9/2022    INTERVAL EVENTS  Over last couple weeks in particular last month she is developed recurrence of left ear pain and has been trying to have follow-up with ENT.  No current antibiotic use but she does note some discharge.  No fevers or chills.  Aside from left ear symptoms she is doing exceptionally well.        ROS  A comprehensive 14-point review of systems was reviewed with patient and was negative other than as specified above.   ?     Objective:      Physical Exam        Vitals:    12/12/22 1416   BP: 97/69   Pulse: 80   Temp: 97.1 °F (36.2 °C)      ECOG:?0   General appearance: Generally well appearing, in no acute distress.   Head, eyes, ears, nose, and throat: moist mucous membranes.  Pain left ear  Respiratory:  Normal work of breathing  Abdomen: nontender, nondistended.   Extremities: Warm, without edema.   Neurologic: Alert and oriented. Grossly normal strength, coordination, and gait.   Skin: No rashes, ecchymoses or petechial lesion.   Psychiatric:  Normal mood and affect.      Laboratory:  ?   Lab Visit on 12/12/2022   Component Date Value Ref Range Status    WBC 12/12/2022 6.87  3.90 - 12.70 K/uL Final    RBC 12/12/2022 3.27 (L)  4.00 - 5.40 M/uL Final     Hemoglobin 12/12/2022 12.1  12.0 - 16.0 g/dL Final    Hematocrit 12/12/2022 36.8 (L)  37.0 - 48.5 % Final    MCV 12/12/2022 113 (H)  82 - 98 fL Final    MCH 12/12/2022 37.0 (H)  27.0 - 31.0 pg Final    MCHC 12/12/2022 32.9  32.0 - 36.0 g/dL Final    RDW 12/12/2022 13.9  11.5 - 14.5 % Final    Platelets 12/12/2022 267  150 - 450 K/uL Final    MPV 12/12/2022 11.0  9.2 - 12.9 fL Final    Immature Granulocytes 12/12/2022 2.3 (H)  0.0 - 0.5 % Final    Gran # (ANC) 12/12/2022 3.4  1.8 - 7.7 K/uL Final    Immature Grans (Abs) 12/12/2022 0.16 (H)  0.00 - 0.04 K/uL Final    Lymph # 12/12/2022 2.2  1.0 - 4.8 K/uL Final    Mono # 12/12/2022 0.6  0.3 - 1.0 K/uL Final    Eos # 12/12/2022 0.4  0.0 - 0.5 K/uL Final    Baso # 12/12/2022 0.08  0.00 - 0.20 K/uL Final    nRBC 12/12/2022 0  0 /100 WBC Final    Gran % 12/12/2022 49.3  38.0 - 73.0 % Final    Lymph % 12/12/2022 32.0  18.0 - 48.0 % Final    Mono % 12/12/2022 8.9  4.0 - 15.0 % Final    Eosinophil % 12/12/2022 6.3  0.0 - 8.0 % Final    Basophil % 12/12/2022 1.2  0.0 - 1.9 % Final    Differential Method 12/12/2022 Automated   Final    Sodium 12/12/2022 142  136 - 145 mmol/L Final    Potassium 12/12/2022 4.4  3.5 - 5.1 mmol/L Final    Chloride 12/12/2022 105  95 - 110 mmol/L Final    CO2 12/12/2022 25  23 - 29 mmol/L Final    Glucose 12/12/2022 117 (H)  70 - 110 mg/dL Final    BUN 12/12/2022 8  6 - 20 mg/dL Final    Creatinine 12/12/2022 1.3  0.5 - 1.4 mg/dL Final    Calcium 12/12/2022 9.6  8.7 - 10.5 mg/dL Final    Total Protein 12/12/2022 7.1  6.0 - 8.4 g/dL Final    Albumin 12/12/2022 3.7  3.5 - 5.2 g/dL Final    Total Bilirubin 12/12/2022 0.3  0.1 - 1.0 mg/dL Final    Alkaline Phosphatase 12/12/2022 63  55 - 135 U/L Final    AST 12/12/2022 17  10 - 40 U/L Final    ALT 12/12/2022 18  10 - 44 U/L Final    Anion Gap 12/12/2022 12  8 - 16 mmol/L Final    eGFR 12/12/2022 49 (A)  >60 mL/min/1.73 m^2 Final      Lab Results   Component Value  Date    WBC 6.87 12/12/2022    HGB 12.1 12/12/2022    HCT 36.8 (L) 12/12/2022     (H) 12/12/2022     12/12/2022         Chemistry        Component Value Date/Time     12/12/2022 1317    K 4.4 12/12/2022 1317     12/12/2022 1317    CO2 25 12/12/2022 1317    BUN 8 12/12/2022 1317    CREATININE 1.3 12/12/2022 1317     (H) 12/12/2022 1317        Component Value Date/Time    CALCIUM 9.6 12/12/2022 1317    ALKPHOS 63 12/12/2022 1317    AST 17 12/12/2022 1317    ALT 18 12/12/2022 1317    BILITOT 0.3 12/12/2022 1317    ESTGFRAFRICA 60 07/23/2022 1922    EGFRNONAA 52 (A) 07/23/2022 1922          ? IMAGING   No results found. However, due to the size of the patient record, not all encounters were searched. Please check Results Review for a complete set of results.    No results found. However, due to the size of the patient record, not all encounters were searched. Please check Results Review for a complete set of results.    PATHOLOGY  2/2021  Station 7 lymph node, fine needle aspiration (8 smears, 1 cell block):       -  Positive for malignant cells, morphologically consistent with   metastatic squamous cell carcinoma     Assessment/Plan:       No diagnosis found.        Plan:     # metastatic squamous cell carcinoma of the cervix SCC cervix:  history of HSIL Status post LEEP 2017. In December 2020 follow-up with gynecology with new spotty vaginal bleeding/discharge with biopsy 12/7/20 showing squamous cell carcinoma consistent with cervical primary.  MRI pelvis performed showing locally advanced disease with 4.9 cm cervical mass with right parametrial involvement and enlarged right external iliac lymph node 1.5 x 1.2 cm. PET-CT showing avidity of cervical mass with extension to lower uterine segment, right vaginal focus of avidity and lymphadenopathy including right internal external iliac, periaortic, pericaval.  There is the new hypermetabolic lymphadenopathy in right subcarinal 2.3 cm  SUV 5.8. Small 9 mm right paratracheal S base slightly increased no FDG avid SUV 2.4.  01/29/2021 multidisciplinary tumor board discussion of her case with review of imaging; concern for new avid lymphadenopathy particularly in right subcarinal may be most consistent with metastatic cervical squamous cell carcinoma; tumor board recommendation for biopsy to confirm for prognostic information as well as given other concomitant malignancy to exclude alternative histology, although felt less likely metastatic breast given this is been well controlled and primarily bony disease involvement.  We discussed recommendation for systemic chemotherapy given advanced cervical squamous cell carcinoma with cisplatin, paclitaxel and bevacizumab with discontinuation of palbociclib but can continue aromatase inhibitor; taxane may also be active for her concomitant metastatic breast cancer.   - EBUS with biopsy 2/18/21, station 7 consistent with metastatic squamous cell carcinoma of cervical origin.  - STRATA requested on cervical SCC, ERBB3 <5% felt to be subclonal per report, PIK3CA amplification, YP1211, KDM6A, FRANK, TMB low, PDL1 high may indicated sensitivity to check point inhibitor in future regimen.  - audiology exam 2/18/21, recommended that she let us know if any perceived change in her hearing throughout treatment and recommend repeat testing throughout to ensure no ototoxicity.  - [facility] germline genetic testing returned negative for mutation, provided to patient.  - cycle 1 day 1 cisplatin paclitaxel and bevacizumab on 02/22/2021  Repeat scans 04/26/2021 showed excellent improvement in thoracic and pelvic lymphadenopathy and primary cervical mass.  She is asymptomatic from this no further bleeding.  Recommended continuation of her current regimen which she is tolerating well with supportive care, IV fluids 3 times weekly per patient preference.  - restaging after cycle 6 she has continued improvement in metastatic cervical  squamous cell carcinoma; avidity in left vulvar area diminished associated with known infection.  She has required substantial supportive care on chemotherapy and recommend given sustained improvement on scans continuing and absence of chemotherapy with bevacizumab alone and continue close surveillance.  She would like to continue IV fluids weekly as needed.    We reviewed in detail restaging PET-CT January 2022 notable for uptake in cervical region concerning for oligo progression/recurrence of her disease.  Previously biopsy proven metastatic cervical cancer in lungs without evidence of recurrent mass/lymphadenopathy/avidity in this region or otherwise.    Given prior PDL1 high disease CPS >1 we decided to proceed with chemoimmunotherapy per Keynote 826, carboplatin, paclitaxel and pembrolizumab.   She completed 6 cycles of carboplatin paclitaxel with pembrolizumab (1 cycle without pembrolizumab from unclear reasons with my colleague) with interval brachytherapy [place]. Váz[place].    Restaging PET scan with diminished avidity vaginal cuff status post brachytherapy.  No new areas concerning for active disease.  We discussed consideration of holding further maintenance immunotherapy at this time given no evidence of recurrent / progressive metastatic disease and is amenable to this.  Will continue her breast cancer treatment per below.    Restaging scan 3 mo from prior in 1/11/2022, will fup along with gyn onc after this.    # Hypothyroidism: Rising TSH and low free T4. Will dose increase by 25 mcg further.       Macrocytosis: Possibly related to thyroid disorder will continue to monitor.  She is on vitamin-B supplement.  No new anemia.    # neuropathy:  Mild, will monitor with re-initiation of chemotherapy per above.    # metastatic breast cancer ER positive HER2 negative:  Had been on systemic therapy with palbociclib and letrozole. While on systemic chemo due to c/f  cytopenias/toxicity palbociclib was on hold.  Will be continuing on maintenance pembrolizumab labs without concerning cytopenia, mild anemia.  Recommend restarting palbociclib patient notes having this at home.  Will send script to specialty pharmacy.  Continuing letrozole.    # bony metastatic disease:  on zoledronic acid dosed every 12 weeks. - Last zometa 1/18/22; next planned 4/12/22  DEXA 8/2021 without bone loss   Recent PET scan January 2022 without evidence of new bony metastatic disease.  She feels most comfortable with maintenance q.3 months which is reasonable.  Resolution of ENT/left ear infection.restarted zometa support.   Zometa due 08/11/2022   However in light of progressive pain left ear/ jaw recommend holding at this time until further ENT evaluation.  Continue calcium vitamin-D supplementation.    # history of papillary thyroid carcinoma status post total thyroidectomy on 08/31/2017: s/p total thyroidectomy on levothyroxine.     # tobacco abuse:  Current tobacco use.   I have counseled for at least 3 min on the importance of tobacco cessation and discussed pharmacologic and therapy options to  aid in cessation.  She is interested in trial of bupropion prescribed to her local pharmacy.    Follow-Up:   Holding ibrance until fup with ENT  Fup in 4 wks after PET 1/11/22  Route Chart for Scheduling    Med Onc Chart Routing      Follow up with physician 4 weeks. should have restaging scan prior, ordered by Dr. Desouza   Follow up with [facility]    Infusion scheduling note    Injection scheduling note    Labs CBC, CMP, TSH and free T4   Lab interval:  prior to visit in 4 wks   Imaging    Pharmacy appointment    Other referrals          Supportive Plan Information  OP ZOLEDRONIC ACID (ZOMETA) Q12W   Estefani Asencio MD   Upcoming Treatment Dates - OP ZOLEDRONIC ACID (ZOMETA) Q12W    11/3/2022       Medications       zoledronic acid (ZOMETA) 3.5 mg in sodium chloride 0.9% 100 mL IVPB    Therapy Plan  Information  IV Fluids  sodium chloride 0.9% bolus 1,000 mL  1,000 mL, Intravenous, PRN  Flushes  sodium chloride 0.9% flush 10 mL  10 mL, Intravenous, PRN  heparin, porcine (PF) 100 unit/mL injection flush 500 Units  500 Units, Intravenous, PRN

## 2022-12-12 NOTE — NURSING
".Pt here for Mediport Flush. Right chestwall mediport accessed with a 20g 1" tomlin via sterile technique.  Excellent blood return noted.  Flushed with 10ml NS and 5 ml heparin solution.  Needle D/C, site without redness, swelling, or drainage noted.  Dressing applied.  Patient tolerated well.   "

## 2022-12-12 NOTE — DISCHARGE INSTRUCTIONS
.[facility]  [address] [address]  [address]  [phone] phone     [phone] fax  Hours of Operation: Monday- Friday 8:00am- 5:00pm  After hours phone  [phone]  Hematology / Oncology Physicians on call    Dr. Jake Carter      Nurse Practitioners:    Virginie Garrison, VINNY Sy, VINNY Dee, VINNY Prieto, VINNY Olivas, VINNY Parson, [place]      Please don't hesitate to call if you have any concerns.    .FALL PREVENTION   Falls often occur due to slipping, tripping or losing your balance. Here are ways to reduce your risk of falling again.   Was there anything that caused your fall that can be fixed, removed or replaced?   Make your home safe by keeping walkways clear of objects you may trip over.   Use non-slip pads under rugs.   Do not walk in poorly lit areas.   Do not stand on chairs or wobbly ladders.   Use caution when reaching overhead or looking upward. This position can cause a loss of balance.   Be sure your shoes fit properly, have non-slip bottoms and are in good condition.   Be cautious when going up and down stairs, curbs, and when walking on uneven sidewalks.   If your balance is poor, consider using a cane or walker.   If your fall was related to alcohol use, stop or limit alcohol intake.   If your fall was related to use of sleeping medicines, talk to your doctor about this. You may need to reduce your dosage at bedtime if you awaken during the night to go to the bathroom.   To reduce the need for nighttime bathroom trips:   Avoid drinking fluids for several hours before going to bed   Empty your bladder before going to bed   Men can keep a urinal at the bedside   © 1588-5116 [employer], [address] [place] [address], [place] [address]. All rights reserved. This information is not intended as a substitute for professional medical care. Always follow your healthcare  professional's instructions.  .WAYS TO HELP PREVENT INFECTION        WASH YOUR HANDS OFTEN DURING THE DAY, ESPECIALLY BEFORE YOU EAT, AFTER USING THE BATHROOM, AND AFTER TOUCHING ANIMALS    STAY AWAY FROM PEOPLE WHO HAVE ILLNESSES YOU CAN CATCH; SUCH AS COLDS, FLU, CHICKEN POX    TRY TO AVOID CROWDS    STAY AWAY FROM CHILDREN WHO RECENTLY HAVE RECEIVED LIVE VIRUS VACCINES    MAINTAIN GOOD MOUTH CARE    DO NOT SQUEEZE OR SCRATCH PIMPLES    CLEAN CUTS & SCRAPES RIGHT AWAY AND DAILY UNTIL HEALED WITH WARM WATER, SOAP & AN ANTISEPTIC    AVOID CONTACT WITH LITTER BOXES, BIRD CAGES, & FISH TANKS    AVOID STANDING WATER, IE., BIRD BATHS, FLOWER POTS/VASES, OR HUMIDIFIERS    WEAR GLOVES WHEN GARDENING OR CLEANING UP AFTER OTHERS, ESPECIALLY BABIES & SMALL CHILDREN    DO NOT EAT RAW FISH, SEAFOOD, MEAT, OR EGGS

## 2022-12-12 NOTE — Clinical Note
Hi there was hoping to reach out on our mutual patient here with metastatic breast and cervical cancer on active therapy hi there is hoping to reach out regarding our mutual patient.  She is on active oral chemotherapy for metastatic breast cancer and also has metastatic cervical cancer both under stable control.  As you know you have been following her for recurrent left ear pain.  We have been holding for some time bisphosphonate due to this concern however concerns for recurrence of pain in discharge I am unsure if active infection and am instructing her to hold her oral chemotherapy at of precaution.  Will you be able to see her to evaluate and let me know your thoughts since this is really her only concern.  She is doing excellent from malignancy standpoint.

## 2022-12-13 DIAGNOSIS — C77.3 MALIGNANT NEOPLASM METASTATIC TO LYMPH NODE OF AXILLA: Primary | ICD-10-CM

## 2022-12-14 ENCOUNTER — PATIENT MESSAGE (OUTPATIENT)
Dept: OTOLARYNGOLOGY | Facility: CLINIC | Age: 52
End: 2022-12-14
Payer: MEDICAID

## 2022-12-15 ENCOUNTER — PATIENT MESSAGE (OUTPATIENT)
Dept: PHARMACY | Facility: CLINIC | Age: 52
End: 2022-12-15
Payer: MEDICAID

## 2022-12-15 ENCOUNTER — OFFICE VISIT (OUTPATIENT)
Dept: OTOLARYNGOLOGY | Facility: CLINIC | Age: 52
End: 2022-12-15
Payer: MEDICAID

## 2022-12-15 VITALS — TEMPERATURE: 97 F | HEIGHT: 67 IN | WEIGHT: 202.81 LBS | BODY MASS INDEX: 31.83 KG/M2

## 2022-12-15 DIAGNOSIS — H60.42 CHOLESTEATOMA OF LEFT EXTERNAL AUDITORY CANAL: ICD-10-CM

## 2022-12-15 DIAGNOSIS — H92.12 CHRONIC OTORRHEA OF LEFT EAR: ICD-10-CM

## 2022-12-15 DIAGNOSIS — H92.02 OTALGIA, LEFT: ICD-10-CM

## 2022-12-15 DIAGNOSIS — I96: Primary | ICD-10-CM

## 2022-12-15 PROCEDURE — 3008F PR BODY MASS INDEX (BMI) DOCUMENTED: ICD-10-PCS | Mod: CPTII,,, | Performed by: OTOLARYNGOLOGY

## 2022-12-15 PROCEDURE — 87102 FUNGUS ISOLATION CULTURE: CPT | Performed by: OTOLARYNGOLOGY

## 2022-12-15 PROCEDURE — 87070 CULTURE OTHR SPECIMN AEROBIC: CPT | Performed by: OTOLARYNGOLOGY

## 2022-12-15 PROCEDURE — 92504 EAR MICROSCOPY EXAMINATION: CPT | Mod: PBBFAC | Performed by: OTOLARYNGOLOGY

## 2022-12-15 PROCEDURE — 99214 OFFICE O/P EST MOD 30 MIN: CPT | Mod: PBBFAC | Performed by: OTOLARYNGOLOGY

## 2022-12-15 PROCEDURE — 92504 EAR MICROSCOPY EXAMINATION: CPT | Mod: S$PBB,,, | Performed by: OTOLARYNGOLOGY

## 2022-12-15 PROCEDURE — 1159F MED LIST DOCD IN RCRD: CPT | Mod: CPTII,,, | Performed by: OTOLARYNGOLOGY

## 2022-12-15 PROCEDURE — 99999 PR PBB SHADOW E&M-EST. PATIENT-LVL IV: ICD-10-PCS | Mod: PBBFAC,,, | Performed by: OTOLARYNGOLOGY

## 2022-12-15 PROCEDURE — 99214 OFFICE O/P EST MOD 30 MIN: CPT | Mod: 25,S$PBB,, | Performed by: OTOLARYNGOLOGY

## 2022-12-15 PROCEDURE — 1159F PR MEDICATION LIST DOCUMENTED IN MEDICAL RECORD: ICD-10-PCS | Mod: CPTII,,, | Performed by: OTOLARYNGOLOGY

## 2022-12-15 PROCEDURE — 3008F BODY MASS INDEX DOCD: CPT | Mod: CPTII,,, | Performed by: OTOLARYNGOLOGY

## 2022-12-15 PROCEDURE — 92504 PR EAR MICROSCOPY EXAMINATION: ICD-10-PCS | Mod: S$PBB,,, | Performed by: OTOLARYNGOLOGY

## 2022-12-15 PROCEDURE — 99999 PR PBB SHADOW E&M-EST. PATIENT-LVL IV: CPT | Mod: PBBFAC,,, | Performed by: OTOLARYNGOLOGY

## 2022-12-15 PROCEDURE — 87205 SMEAR GRAM STAIN: CPT | Performed by: OTOLARYNGOLOGY

## 2022-12-15 PROCEDURE — 99214 PR OFFICE/OUTPT VISIT, EST, LEVL IV, 30-39 MIN: ICD-10-PCS | Mod: 25,S$PBB,, | Performed by: OTOLARYNGOLOGY

## 2022-12-15 PROCEDURE — 87075 CULTR BACTERIA EXCEPT BLOOD: CPT | Performed by: OTOLARYNGOLOGY

## 2022-12-15 RX ORDER — PNV NO.95/FERROUS FUM/FOLIC AC 28MG-0.8MG
1000 TABLET ORAL DAILY
Qty: 30 CAPSULE | Refills: 11 | Status: SHIPPED | OUTPATIENT
Start: 2022-12-15 | End: 2024-01-04

## 2022-12-15 RX ORDER — PENTOXIFYLLINE 400 MG/1
400 TABLET, EXTENDED RELEASE ORAL 2 TIMES DAILY
Qty: 60 TABLET | Refills: 11 | Status: SHIPPED | OUTPATIENT
Start: 2022-12-15 | End: 2023-12-15

## 2022-12-15 NOTE — LETTER
December 17, 2022        Estefani Asencio MD  [address]  [place] [address]             [facility]  [address]  [place] [address]  Phone: [phone]  Fax: [phone]   Patient: Josey Flores   MR Number: [id number]   YOB: 1970   Date of Visit: 12/15/2022       Dear Dr. Asencio:    Thank you for referring Josey Flores to me for evaluation. Below are the relevant portions of my assessment and plan of care.     Problem List Items Addressed This Visit    None  Visit Diagnoses       Cholesteatoma of left external auditory canal    -  Primary    Relevant Orders    Gram stain (Completed)    Aerobic culture    Fungus culture    Culture, Anaerobic                If you have questions, please do not hesitate to call me. I look forward to following Josey along with you.    Sincerely,      Gilbert Wu MD           CC  No Recipients

## 2022-12-16 LAB
GRAM STN SPEC: NORMAL
GRAM STN SPEC: NORMAL

## 2022-12-16 NOTE — PROGRESS NOTES
Subjective:       Patient ID: Josey Flores is a 52 y.o. female.    Chief Complaint: left ear pain     Follow-up     10/20/2022: with hx of BRCA s/p chemotherapy.  She has a history of osteonecrosis of the the left EAC, thought to be as a result bisphosphonate use.  She presents for recent increased ear pain and otorrhea.        Review of Systems   HENT:  Positive for ear discharge, ear pain and facial swelling.    Eyes: Negative.    Respiratory:  Positive for wheezing.    Cardiovascular: Negative.    Gastrointestinal: Negative.    Endocrine: Positive for cold intolerance and heat intolerance.   Genitourinary: Negative.    Musculoskeletal:  Positive for back pain.   Integumentary:  Negative.   Allergic/Immunologic: Negative.    Neurological: Negative.    Hematological: Negative.    Psychiatric/Behavioral:  Positive for decreased concentration and sleep disturbance. The patient is nervous/anxious.        Objective:      Physical Exam  Constitutional:       Appearance: Normal appearance.   HENT:      Head: Atraumatic.      Right Ear: External ear normal.      Left Ear: External ear normal.   Neurological:      Mental Status: She is alert.          inocular Microscopy  Indications: chronic otorrhea   Details: binocular microscopy used to examine both ears  Findings  AD: within normal limits  AS: inferior and posterior Erosion of EAC with exposure of mastoid air cells.  Eroded cavity filled with keratinaceous moist debris. No rosa purulence, no change from previous examinations.  Applied layer of topical cipro/clotrimazole/boric acid and dexamethasone powder to cavity.       Assessment:       Problem List Items Addressed This Visit    None  Visit Diagnoses       Osteonecrosis of external ear canal, left    -  Primary    Cholesteatoma of left external auditory canal        Relevant Orders    Gram stain (Completed)    Aerobic culture (Completed)    Fungus culture    Culture, Anaerobic    Chronic otorrhea of left  ear        Otalgia, left                Plan:         In summary this is a pleasant 52 y.o. 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  She has had progression increased pain and drainage.  On exam the site appears visually unchanged from prior examinations.       We discussed options including elective modified radical tympanomastoidectomy.  We discussed that this is a surgical problem and will remain in its current state chronically.  She declined at this time and would like to continue non-surgical management, as I discussed although I believe surgery would help her pain, it cannot be guaranteed and would create a sizable cavity of exposed bone that would need to re-epithelialize on its own.     Will try starting the patient on antioxidant treatment commonly used for osteoradionecrosis combination pentoxyfiline and Tocopherol daily.      Follow up in 1 month to re-discuss if no improvement.    Cultures taken from discharge f/u results.      Gilbert Wu MD  Otology, Neurotology, and Skull Base Surgery  Department of Otorhinolaryngology  [facility]

## 2022-12-19 ENCOUNTER — SPECIALTY PHARMACY (OUTPATIENT)
Dept: PHARMACY | Facility: CLINIC | Age: 52
End: 2022-12-19
Payer: MEDICAID

## 2022-12-19 LAB — BACTERIA SPEC AEROBE CULT: NO GROWTH

## 2022-12-19 NOTE — TELEPHONE ENCOUNTER
Specialty Pharmacy - Refill Coordination    Specialty Medication Orders Linked to Encounter      Flowsheet Row Most Recent Value   Medication #1 palbociclib (IBRANCE) 125 mg Cap (Order#[id number], Rx#[id number])          Refill Questions - Documented Responses      Flowsheet Row Most Recent Value   Patient Availability and HIPAA Verification    Does patient want to proceed with activity? Unable to Reach          We have had multiple attempts to the patient and have been unsuccessful to reach the patient. We will stop reaching out to the patient but in the event that the patient needs the med and contacts us, we will communicate and begin dispensing for the patient. At your next visit with the patient, please review the importance of being in contact with our specialty pharmacy as a part of our care team.      Kyaar Jimenez, PharmD  [facility]  [address] [address]  Phone: [phone]  Fax: [phone]

## 2022-12-21 ENCOUNTER — TELEPHONE (OUTPATIENT)
Dept: GYNECOLOGIC ONCOLOGY | Facility: CLINIC | Age: 52
End: 2022-12-21
Payer: MEDICAID

## 2022-12-21 NOTE — TELEPHONE ENCOUNTER
Spoke with our patient about her reschedule appointment she voiced understanding of the date, time and location. All questions answered appointment mail. Provider Scheduling Coord.  Gynecologic Oncology MA/PAR /Preceptor Vladimir Silverio

## 2022-12-23 LAB — BACTERIA SPEC ANAEROBE CULT: NORMAL

## 2023-01-02 NOTE — PROGRESS NOTES
REASON FOR CONSULT  Josey Flores  is a 52 y.o.  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  The interval history is as follows: +PO. -N/V. +Flatus. +BM. -VB, VD, changes in stool or urine. -Abdominal or pelvic pain. -Unintentional weight loss.      Oncology History   Secondary cancer of bone   Malignant neoplasm of endocervix   9/10/2020 Imaging Significant Findings    CT A/P: Negative for ascites, omental caking, lymphadenopathy, or a gross cervical mass     12/7/2020 Biopsy    EMBX: SCC, p16+     1/12/2021 Imaging Significant Findings    MRI Pelvis w/ w/o  4.9 cm cervical mass  R parametrial invasion  R external iliac lymphadenopathy     1/27/2021 Imaging Significant Findings    PET/CT: FDG avidity in the chest     2/18/2021 Biopsy    Endoscopic biopsy of the lung: +      2/22/2021 - 6/29/2021 Chemotherapy    Cisplatin/paclitaxel/avastin x6     3/12/2021 Genetic Testing    STRATA: FRANK, PIK3c, Ep300, ERBB3, KDM6A     4/24/2021 Imaging Significant Findings    CT Pelvis w/: JUAN FRANCISCO with a normal cervix     4/26/2021 Imaging Significant Findings    PET/CT: No FDG avidity     5/18/2021 Imaging Significant Findings    CT A/P w/: JUAN FRANCISCO     6/5/2021 Imaging Significant Findings    CT A/P w/: JUAN FRANCISCO     7/12/2021 Imaging Significant Findings    PET/CT: JUAN FRANCISCO     7/13/2021 - 12/28/2021 Maintenance Therapy    Avastin x 9     10/20/2021 Imaging Significant Findings    PET/CT: JUAN FRANCISCO     1/11/2022 Imaging Significant Findings    PET/CT: Increased FDG avidity in the cervix.  No other evidence of disease.     1/28/2022 - 6/2/2022 Chemotherapy    Carboplatin/paclitaxel/pembrolizumab x 6     3/25/2022 Imaging Significant Findings    PET/CT: Persistent FDG avidity in the cervix.  New FDG avidity in the inguinal lymph nodes (although it does not look suspicious)     4/25/2022 - 4/29/2022 Radiation  Therapy    Treating physician: Dr. Vázquez (MRI guided T&O)  Total Dose: 30 Gy  Fractions: 5     6/30/2022 - 8/11/2022 Maintenance Therapy    Pembrolizumab 400 mg IV x2     8/5/2022 Imaging Significant Findings    PET/CT: JUAN FRANCISCO     10/16/2022 Imaging Significant Findings    CT A/P w/ (Women's without imaging available): JUAN FRANCISCO     1/11/2023 Imaging Significant Findings    PET/CT: JUAN FRANCISCO          The following portions of the patient's history were reviewed and updated as appropriate: allergies, current medications, family history, medical history, social history and surgical history.    REVIEW OF SYSTEMS  All systems reviewed and negative except as noted in HPI.    OBJECTIVE   Vitals:    01/12/23 1124   BP: 116/68          Body mass index is 33.17 kg/m².      1. General: Well appearing, no apparent distress, alert and oriented.  2. Lymph: Neck symmetric without cervical or supraclavicular adenopathy or mass.  3. Lungs: Normal respiratory rate, no accessory muscle use.  4. Cardiac: Normal rate  5. Psych: Normal affect.  6. Abdomen:  non-distended, soft, non-tender, are no masses, no ascites, no hepatosplenomegaly.  7. Skin: Warm, dry, no rashes or lesions.   8. Extremities: Bilateral lower extremities without edema or tenderness.  9. Genitourinary               Pelvic Examination including:                a. External genitalia are normal in appearance. No lesions noted.  No inguinal lymphadenopathy               b. Urethral meatus is normal size, location, and appearance.               c. Urethra is negative.               d. Bladder is nontender. No masses noted.               e. Vagina has normal mucosa with radiation changes.  Cervix could not be visualized but was normal to palpation.  No nodularity along the vaginal walls.   f. Rectum with normal mucosa    ECOG status: 2    LABORATORY DATA  Lab data reviewed.    RADIOLOGICAL DATA  Radiology data reviewed.    ASSESSMENT / PLAN     1. Personal history of malignant neoplasm  of cervix uteri    2. Encounter for follow-up examination after completed treatment for malignant neoplasm    3. Malignant neoplasm of overlapping sites of right breast in female, estrogen receptor positive    4. Malignant neoplasm metastatic to lymph node of axilla    5. Secondary cancer of bone    6. Obesity (BMI 30-39.9)    7. Tobacco abuse        JUAN FRANCISCO.  RON 3 months.     PATIENT EDUCATION  Ready to learn, no apparent learning barriers were identified; learning preferences include listening. Explained diagnosis and treatment plan; patient expressed understanding of the content.    ADMINISTRATIVE BILLING  Greater than 50% of was spent in counseling.

## 2023-01-06 DIAGNOSIS — F41.9 ANXIETY: ICD-10-CM

## 2023-01-06 DIAGNOSIS — G89.3 NEOPLASM RELATED PAIN: ICD-10-CM

## 2023-01-09 RX ORDER — MORPHINE SULFATE 15 MG/1
15 TABLET, FILM COATED, EXTENDED RELEASE ORAL 2 TIMES DAILY
Qty: 60 TABLET | Refills: 0 | Status: SHIPPED | OUTPATIENT
Start: 2023-01-09 | End: 2023-02-08 | Stop reason: SDUPTHER

## 2023-01-09 RX ORDER — ALPRAZOLAM 1 MG/1
.5-1 TABLET ORAL 2 TIMES DAILY PRN
Qty: 60 TABLET | Refills: 0 | Status: SHIPPED | OUTPATIENT
Start: 2023-01-09 | End: 2023-02-08 | Stop reason: SDUPTHER

## 2023-01-09 RX ORDER — HYDROCODONE BITARTRATE AND ACETAMINOPHEN 10; 325 MG/1; MG/1
1 TABLET ORAL EVERY 4 HOURS PRN
Qty: 120 TABLET | Refills: 0 | Status: SHIPPED | OUTPATIENT
Start: 2023-01-09 | End: 2023-02-08 | Stop reason: SDUPTHER

## 2023-01-11 ENCOUNTER — HOSPITAL ENCOUNTER (OUTPATIENT)
Dept: RADIOLOGY | Facility: HOSPITAL | Age: 53
Discharge: HOME | End: 2023-01-11
Attending: OBSTETRICS & GYNECOLOGY
Payer: MEDICAID

## 2023-01-11 ENCOUNTER — INFUSION (OUTPATIENT)
Dept: INFUSION THERAPY | Facility: HOSPITAL | Age: 53
End: 2023-01-11
Attending: OBSTETRICS & GYNECOLOGY
Payer: MEDICAID

## 2023-01-11 DIAGNOSIS — T45.1X5A CHEMOTHERAPY-INDUCED NAUSEA: ICD-10-CM

## 2023-01-11 DIAGNOSIS — Z17.0 MALIGNANT NEOPLASM OF OVERLAPPING SITES OF RIGHT BREAST IN FEMALE, ESTROGEN RECEPTOR POSITIVE: ICD-10-CM

## 2023-01-11 DIAGNOSIS — Z85.41 PERSONAL HISTORY OF MALIGNANT NEOPLASM OF CERVIX UTERI: ICD-10-CM

## 2023-01-11 DIAGNOSIS — C79.51 SECONDARY CANCER OF BONE: ICD-10-CM

## 2023-01-11 DIAGNOSIS — R11.0 CHEMOTHERAPY-INDUCED NAUSEA: ICD-10-CM

## 2023-01-11 DIAGNOSIS — L29.9 PRURITUS: Primary | ICD-10-CM

## 2023-01-11 DIAGNOSIS — C50.811 MALIGNANT NEOPLASM OF OVERLAPPING SITES OF RIGHT BREAST IN FEMALE, ESTROGEN RECEPTOR POSITIVE: ICD-10-CM

## 2023-01-11 DIAGNOSIS — C77.3 MALIGNANT NEOPLASM METASTATIC TO LYMPH NODE OF AXILLA: ICD-10-CM

## 2023-01-11 DIAGNOSIS — C73 PAPILLARY THYROID CARCINOMA: ICD-10-CM

## 2023-01-11 PROCEDURE — A4216 STERILE WATER/SALINE, 10 ML: HCPCS | Performed by: INTERNAL MEDICINE

## 2023-01-11 PROCEDURE — 63600175 PHARM REV CODE 636 W HCPCS: Performed by: INTERNAL MEDICINE

## 2023-01-11 PROCEDURE — 78815 PET IMAGE W/CT SKULL-THIGH: CPT | Mod: 26,PS,, | Performed by: RADIOLOGY

## 2023-01-11 PROCEDURE — 78815 PET IMAGE W/CT SKULL-THIGH: CPT | Mod: TC

## 2023-01-11 PROCEDURE — 25000003 PHARM REV CODE 250: Performed by: INTERNAL MEDICINE

## 2023-01-11 PROCEDURE — A9552 F18 FDG: HCPCS

## 2023-01-11 PROCEDURE — 36591 DRAW BLOOD OFF VENOUS DEVICE: CPT

## 2023-01-11 PROCEDURE — 78815 NM PET CT ROUTINE: ICD-10-PCS | Mod: 26,PS,, | Performed by: RADIOLOGY

## 2023-01-11 RX ORDER — SODIUM CHLORIDE 0.9 % (FLUSH) 0.9 %
10 SYRINGE (ML) INJECTION
Status: CANCELLED | OUTPATIENT
Start: 2023-01-11

## 2023-01-11 RX ORDER — HEPARIN 100 UNIT/ML
500 SYRINGE INTRAVENOUS
Status: CANCELLED | OUTPATIENT
Start: 2023-01-11

## 2023-01-11 RX ORDER — SODIUM CHLORIDE 0.9 % (FLUSH) 0.9 %
10 SYRINGE (ML) INJECTION
Status: DISCONTINUED | OUTPATIENT
Start: 2023-01-11 | End: 2023-01-11 | Stop reason: HOSPADM

## 2023-01-11 RX ORDER — HEPARIN 100 UNIT/ML
500 SYRINGE INTRAVENOUS
Status: DISCONTINUED | OUTPATIENT
Start: 2023-01-11 | End: 2023-01-11 | Stop reason: HOSPADM

## 2023-01-11 RX ADMIN — Medication 10 ML: at 10:01

## 2023-01-11 RX ADMIN — HEPARIN 500 UNITS: 100 SYRINGE at 10:01

## 2023-01-11 NOTE — NURSING
"Pt here for Mediport Flush and lab draw. Right chestwall mediport accessed with a 20g 1" tomlin via sterile technique.  Excellent blood return noted. Blood collected and sent to lab. Flushed with 10ml NS and 5 ml heparin solution. Needle D/C, site without redness, swelling, or drainage noted. Dressing applied. Patient tolerated well. Patient to return to clinic January 13, 2023.  "

## 2023-01-11 NOTE — DISCHARGE INSTRUCTIONS
[facility]  [address] [address]  [address]  [phone] phone     [phone] fax  Hours of Operation: Monday- Friday 8:00am- 5:00pm  After hours phone  [phone]  Hematology / Oncology Physicians on call      PRASANNA Godinez Dr., Dr., NP Sydney Prescott, VINNY Godoy FNP    Please call with any concerns regarding your appointment today.

## 2023-01-12 ENCOUNTER — OFFICE VISIT (OUTPATIENT)
Dept: GYNECOLOGIC ONCOLOGY | Facility: CLINIC | Age: 53
End: 2023-01-12
Payer: MEDICAID

## 2023-01-12 VITALS
SYSTOLIC BLOOD PRESSURE: 116 MMHG | DIASTOLIC BLOOD PRESSURE: 68 MMHG | HEIGHT: 67 IN | WEIGHT: 211.75 LBS | BODY MASS INDEX: 33.24 KG/M2

## 2023-01-12 DIAGNOSIS — Z17.0 MALIGNANT NEOPLASM OF OVERLAPPING SITES OF RIGHT BREAST IN FEMALE, ESTROGEN RECEPTOR POSITIVE: ICD-10-CM

## 2023-01-12 DIAGNOSIS — C77.3 MALIGNANT NEOPLASM METASTATIC TO LYMPH NODE OF AXILLA: ICD-10-CM

## 2023-01-12 DIAGNOSIS — C79.51 SECONDARY CANCER OF BONE: ICD-10-CM

## 2023-01-12 DIAGNOSIS — Z85.41 PERSONAL HISTORY OF MALIGNANT NEOPLASM OF CERVIX UTERI: Primary | ICD-10-CM

## 2023-01-12 DIAGNOSIS — Z08 ENCOUNTER FOR FOLLOW-UP EXAMINATION AFTER COMPLETED TREATMENT FOR MALIGNANT NEOPLASM: ICD-10-CM

## 2023-01-12 DIAGNOSIS — E66.9 OBESITY (BMI 30-39.9): ICD-10-CM

## 2023-01-12 DIAGNOSIS — C50.811 MALIGNANT NEOPLASM OF OVERLAPPING SITES OF RIGHT BREAST IN FEMALE, ESTROGEN RECEPTOR POSITIVE: ICD-10-CM

## 2023-01-12 DIAGNOSIS — Z72.0 TOBACCO ABUSE: ICD-10-CM

## 2023-01-12 PROCEDURE — 99999 PR PBB SHADOW E&M-EST. PATIENT-LVL III: CPT | Mod: PBBFAC,,, | Performed by: OBSTETRICS & GYNECOLOGY

## 2023-01-12 PROCEDURE — 1159F PR MEDICATION LIST DOCUMENTED IN MEDICAL RECORD: ICD-10-PCS | Mod: CPTII,,, | Performed by: OBSTETRICS & GYNECOLOGY

## 2023-01-12 PROCEDURE — 99214 OFFICE O/P EST MOD 30 MIN: CPT | Mod: S$PBB,,, | Performed by: OBSTETRICS & GYNECOLOGY

## 2023-01-12 PROCEDURE — 3008F BODY MASS INDEX DOCD: CPT | Mod: CPTII,,, | Performed by: OBSTETRICS & GYNECOLOGY

## 2023-01-12 PROCEDURE — 3008F PR BODY MASS INDEX (BMI) DOCUMENTED: ICD-10-PCS | Mod: CPTII,,, | Performed by: OBSTETRICS & GYNECOLOGY

## 2023-01-12 PROCEDURE — 1160F PR REVIEW ALL MEDS BY PRESCRIBER/CLIN PHARMACIST DOCUMENTED: ICD-10-PCS | Mod: CPTII,,, | Performed by: OBSTETRICS & GYNECOLOGY

## 2023-01-12 PROCEDURE — 99999 PR PBB SHADOW E&M-EST. PATIENT-LVL III: ICD-10-PCS | Mod: PBBFAC,,, | Performed by: OBSTETRICS & GYNECOLOGY

## 2023-01-12 PROCEDURE — 1160F RVW MEDS BY RX/DR IN RCRD: CPT | Mod: CPTII,,, | Performed by: OBSTETRICS & GYNECOLOGY

## 2023-01-12 PROCEDURE — 3074F SYST BP LT 130 MM HG: CPT | Mod: CPTII,,, | Performed by: OBSTETRICS & GYNECOLOGY

## 2023-01-12 PROCEDURE — 99214 PR OFFICE/OUTPT VISIT, EST, LEVL IV, 30-39 MIN: ICD-10-PCS | Mod: S$PBB,,, | Performed by: OBSTETRICS & GYNECOLOGY

## 2023-01-12 PROCEDURE — 1159F MED LIST DOCD IN RCRD: CPT | Mod: CPTII,,, | Performed by: OBSTETRICS & GYNECOLOGY

## 2023-01-12 PROCEDURE — 3074F PR MOST RECENT SYSTOLIC BLOOD PRESSURE < 130 MM HG: ICD-10-PCS | Mod: CPTII,,, | Performed by: OBSTETRICS & GYNECOLOGY

## 2023-01-12 PROCEDURE — 99213 OFFICE O/P EST LOW 20 MIN: CPT | Mod: PBBFAC | Performed by: OBSTETRICS & GYNECOLOGY

## 2023-01-12 PROCEDURE — 3078F PR MOST RECENT DIASTOLIC BLOOD PRESSURE < 80 MM HG: ICD-10-PCS | Mod: CPTII,,, | Performed by: OBSTETRICS & GYNECOLOGY

## 2023-01-12 PROCEDURE — 3078F DIAST BP <80 MM HG: CPT | Mod: CPTII,,, | Performed by: OBSTETRICS & GYNECOLOGY

## 2023-01-13 ENCOUNTER — OFFICE VISIT (OUTPATIENT)
Dept: HEMATOLOGY/ONCOLOGY | Facility: CLINIC | Age: 53
End: 2023-01-13
Payer: MEDICAID

## 2023-01-13 VITALS
TEMPERATURE: 97 F | HEART RATE: 106 BPM | SYSTOLIC BLOOD PRESSURE: 103 MMHG | DIASTOLIC BLOOD PRESSURE: 72 MMHG | BODY MASS INDEX: 33.25 KG/M2 | WEIGHT: 212.31 LBS | OXYGEN SATURATION: 99 %

## 2023-01-13 DIAGNOSIS — C50.811 MALIGNANT NEOPLASM OF OVERLAPPING SITES OF RIGHT BREAST IN FEMALE, ESTROGEN RECEPTOR POSITIVE: ICD-10-CM

## 2023-01-13 DIAGNOSIS — R94.6 THYROID FUNCTION STUDY ABNORMALITY: ICD-10-CM

## 2023-01-13 DIAGNOSIS — H92.02 LEFT EAR PAIN: ICD-10-CM

## 2023-01-13 DIAGNOSIS — C79.51 SECONDARY CANCER OF BONE: ICD-10-CM

## 2023-01-13 DIAGNOSIS — Z17.0 MALIGNANT NEOPLASM OF OVERLAPPING SITES OF RIGHT BREAST IN FEMALE, ESTROGEN RECEPTOR POSITIVE: ICD-10-CM

## 2023-01-13 DIAGNOSIS — C73 PAPILLARY THYROID CARCINOMA: ICD-10-CM

## 2023-01-13 DIAGNOSIS — C77.3 MALIGNANT NEOPLASM METASTATIC TO LYMPH NODE OF AXILLA: Primary | ICD-10-CM

## 2023-01-13 PROCEDURE — 99213 OFFICE O/P EST LOW 20 MIN: CPT | Mod: PBBFAC | Performed by: INTERNAL MEDICINE

## 2023-01-13 PROCEDURE — 1160F PR REVIEW ALL MEDS BY PRESCRIBER/CLIN PHARMACIST DOCUMENTED: ICD-10-PCS | Mod: CPTII,,, | Performed by: INTERNAL MEDICINE

## 2023-01-13 PROCEDURE — 3078F DIAST BP <80 MM HG: CPT | Mod: CPTII,,, | Performed by: INTERNAL MEDICINE

## 2023-01-13 PROCEDURE — 1160F RVW MEDS BY RX/DR IN RCRD: CPT | Mod: CPTII,,, | Performed by: INTERNAL MEDICINE

## 2023-01-13 PROCEDURE — 99215 OFFICE O/P EST HI 40 MIN: CPT | Mod: S$PBB,,, | Performed by: INTERNAL MEDICINE

## 2023-01-13 PROCEDURE — 3074F PR MOST RECENT SYSTOLIC BLOOD PRESSURE < 130 MM HG: ICD-10-PCS | Mod: CPTII,,, | Performed by: INTERNAL MEDICINE

## 2023-01-13 PROCEDURE — 3008F PR BODY MASS INDEX (BMI) DOCUMENTED: ICD-10-PCS | Mod: CPTII,,, | Performed by: INTERNAL MEDICINE

## 2023-01-13 PROCEDURE — 3078F PR MOST RECENT DIASTOLIC BLOOD PRESSURE < 80 MM HG: ICD-10-PCS | Mod: CPTII,,, | Performed by: INTERNAL MEDICINE

## 2023-01-13 PROCEDURE — 1159F PR MEDICATION LIST DOCUMENTED IN MEDICAL RECORD: ICD-10-PCS | Mod: CPTII,,, | Performed by: INTERNAL MEDICINE

## 2023-01-13 PROCEDURE — 99215 PR OFFICE/OUTPT VISIT, EST, LEVL V, 40-54 MIN: ICD-10-PCS | Mod: S$PBB,,, | Performed by: INTERNAL MEDICINE

## 2023-01-13 PROCEDURE — 99999 PR PBB SHADOW E&M-EST. PATIENT-LVL III: CPT | Mod: PBBFAC,,, | Performed by: INTERNAL MEDICINE

## 2023-01-13 PROCEDURE — 1159F MED LIST DOCD IN RCRD: CPT | Mod: CPTII,,, | Performed by: INTERNAL MEDICINE

## 2023-01-13 PROCEDURE — 99999 PR PBB SHADOW E&M-EST. PATIENT-LVL III: ICD-10-PCS | Mod: PBBFAC,,, | Performed by: INTERNAL MEDICINE

## 2023-01-13 PROCEDURE — 3074F SYST BP LT 130 MM HG: CPT | Mod: CPTII,,, | Performed by: INTERNAL MEDICINE

## 2023-01-13 PROCEDURE — 3008F BODY MASS INDEX DOCD: CPT | Mod: CPTII,,, | Performed by: INTERNAL MEDICINE

## 2023-01-13 NOTE — PROGRESS NOTES
Subjective:      DATE OF VISIT: 1/13/2023   ?   Patient ID:?Josey Flores is a 52 y.o. female.?? MR#: [id number]   ?   PRIMARY PROVIDER: Dr. Asencio  ?   CHIEF COMPLAINT:  Follow-up  ?   ONCOLOGIC DIAGNOSIS:      Stage IV cervical squamous cell carcinoma    stage IV, T2 N1b M1, invasive breast carcinoma, ER/CA+, HER2-    Papillary thyroid carcinoma status post total thyroidectomy on 08/31/2017, mpT1b N0     Cervical HSIL MIREILLE 3 s/p LEEP, 2017  ?   CURRENT TREATMENT:    Letrozole and palbociclib    PAST TREATMENT:    Palliative chemotherapy: cisplatin, paclitaxel and bevacizumab; 02/22/2021 cycle 1 day 1 -> bevacizumab maintenance after 6 cycles  Carboplatin, paclitaxel and pembrolizumab, C1D1 1/28/22; maintenance pembrolizumab, d/c 9/2022    INTERVAL EVENTS  Saw ENT started on chronic anti biotics with improvement in ear/jaw symptoms.  She has occasional GI upset following spicy foods.  Otherwise she is feeling very well stable weight.  Recent PET scan performed and follow-up with Gynecologic Oncology.  Due to concern for possible ear infection we had held her Ibrance.  She is continued on letrozole.    ROS  A comprehensive 14-point review of systems was reviewed with patient and was negative other than as specified above.   ?     Objective:      Physical Exam        Vitals:    01/13/23 1425   BP: 103/72   Pulse: 106   Temp: 97 °F (36.1 °C)      ECOG:?0   General appearance: Generally well appearing, in no acute distress.   Head, eyes, ears, nose, and throat: moist mucous membranes.  Pain left ear  Respiratory:  Normal work of breathing  Abdomen: nontender, nondistended.   Extremities: Warm, without edema.   Neurologic: Alert and oriented. Grossly normal strength, coordination, and gait.   Skin: No rashes, ecchymoses or petechial lesion.   Psychiatric:  Normal mood and affect.      Laboratory:  ?   No visits with results within 1 Day(s) from this visit.   Latest known visit with results is:   Lab Visit on  01/11/2023   Component Date Value Ref Range Status    WBC 01/11/2023 10.13  3.90 - 12.70 K/uL Final    RBC 01/11/2023 3.32 (L)  4.00 - 5.40 M/uL Final    Hemoglobin 01/11/2023 11.8 (L)  12.0 - 16.0 g/dL Final    Hematocrit 01/11/2023 35.2 (L)  37.0 - 48.5 % Final    MCV 01/11/2023 106 (H)  82 - 98 fL Final    MCH 01/11/2023 35.5 (H)  27.0 - 31.0 pg Final    MCHC 01/11/2023 33.5  32.0 - 36.0 g/dL Final    RDW 01/11/2023 13.9  11.5 - 14.5 % Final    Platelets 01/11/2023 226  150 - 450 K/uL Final    MPV 01/11/2023 10.9  9.2 - 12.9 fL Final    Immature Granulocytes 01/11/2023 2.1 (H)  0.0 - 0.5 % Final    Gran # (ANC) 01/11/2023 5.8  1.8 - 7.7 K/uL Final    Immature Grans (Abs) 01/11/2023 0.21 (H)  0.00 - 0.04 K/uL Final    Lymph # 01/11/2023 2.5  1.0 - 4.8 K/uL Final    Mono # 01/11/2023 0.8  0.3 - 1.0 K/uL Final    Eos # 01/11/2023 0.7 (H)  0.0 - 0.5 K/uL Final    Baso # 01/11/2023 0.08  0.00 - 0.20 K/uL Final    nRBC 01/11/2023 0  0 /100 WBC Final    Gran % 01/11/2023 57.5  38.0 - 73.0 % Final    Lymph % 01/11/2023 24.9  18.0 - 48.0 % Final    Mono % 01/11/2023 7.7  4.0 - 15.0 % Final    Eosinophil % 01/11/2023 7.0  0.0 - 8.0 % Final    Basophil % 01/11/2023 0.8  0.0 - 1.9 % Final    Differential Method 01/11/2023 Automated   Final    Sodium 01/11/2023 139  136 - 145 mmol/L Final    Potassium 01/11/2023 3.7  3.5 - 5.1 mmol/L Final    Chloride 01/11/2023 104  95 - 110 mmol/L Final    CO2 01/11/2023 23  23 - 29 mmol/L Final    Glucose 01/11/2023 117 (H)  70 - 110 mg/dL Final    BUN 01/11/2023 16  6 - 20 mg/dL Final    Creatinine 01/11/2023 1.3  0.5 - 1.4 mg/dL Final    Calcium 01/11/2023 9.4  8.7 - 10.5 mg/dL Final    Total Protein 01/11/2023 6.9  6.0 - 8.4 g/dL Final    Albumin 01/11/2023 3.7  3.5 - 5.2 g/dL Final    Total Bilirubin 01/11/2023 0.3  0.1 - 1.0 mg/dL Final    Alkaline Phosphatase 01/11/2023 68  55 - 135 U/L Final    AST 01/11/2023 13  10 - 40 U/L Final    ALT 01/11/2023 16  10 - 44 U/L Final    Anion Gap  01/11/2023 12  8 - 16 mmol/L Final    eGFR 01/11/2023 49 (A)  >60 mL/min/1.73 m^2 Final    Free T4 01/11/2023 0.78  0.71 - 1.51 ng/dL Final    TSH 01/11/2023 24.310 (H)  0.400 - 4.000 uIU/mL Final      Lab Results   Component Value Date    WBC 10.13 01/11/2023    HGB 11.8 (L) 01/11/2023    HCT 35.2 (L) 01/11/2023     (H) 01/11/2023     01/11/2023         Chemistry        Component Value Date/Time     01/11/2023 0919    K 3.7 01/11/2023 0919     01/11/2023 0919    CO2 23 01/11/2023 0919    BUN 16 01/11/2023 0919    CREATININE 1.3 01/11/2023 0919     (H) 01/11/2023 0919        Component Value Date/Time    CALCIUM 9.4 01/11/2023 0919    ALKPHOS 68 01/11/2023 0919    AST 13 01/11/2023 0919    ALT 16 01/11/2023 0919    BILITOT 0.3 01/11/2023 0919    ESTGFRAFRICA 60 07/23/2022 1922    EGFRNONAA 52 (A) 07/23/2022 1922          ? IMAGING   Results for orders placed or performed during the hospital encounter of 01/11/23 (from the past 2160 hour(s))   NM PET CT Routine Skull to Mid Thigh    Impression    No FDG PET/CT findings to suggest recurrent or metastatic disease.      Electronically signed by: Braeden Bernard MD  Date:    01/11/2023  Time:    12:48     *Note: Due to a large number of results and/or encounters for the requested time period, some results have not been displayed. A complete set of results can be found in Results Review.       No results found. However, due to the size of the patient record, not all encounters were searched. Please check Results Review for a complete set of results.    PATHOLOGY  2/2021  Station 7 lymph node, fine needle aspiration (8 smears, 1 cell block):       -  Positive for malignant cells, morphologically consistent with   metastatic squamous cell carcinoma     Assessment/Plan:       1. Malignant neoplasm metastatic to lymph node of axilla    2. Left ear pain    3. Malignant neoplasm of overlapping sites of right breast in female, estrogen receptor  positive    4. Papillary thyroid carcinoma    5. Secondary cancer of bone    6. Thyroid function study abnormality            Plan:     # metastatic squamous cell carcinoma of the cervix SCC cervix:  history of HSIL Status post LEEP 2017. In December 2020 follow-up with gynecology with new spotty vaginal bleeding/discharge with biopsy 12/7/20 showing squamous cell carcinoma consistent with cervical primary.  MRI pelvis performed showing locally advanced disease with 4.9 cm cervical mass with right parametrial involvement and enlarged right external iliac lymph node 1.5 x 1.2 cm. PET-CT showing avidity of cervical mass with extension to lower uterine segment, right vaginal focus of avidity and lymphadenopathy including right internal external iliac, periaortic, pericaval.  There is the new hypermetabolic lymphadenopathy in right subcarinal 2.3 cm SUV 5.8. Small 9 mm right paratracheal S base slightly increased no FDG avid SUV 2.4.  01/29/2021 multidisciplinary tumor board discussion of her case with review of imaging; concern for new avid lymphadenopathy particularly in right subcarinal may be most consistent with metastatic cervical squamous cell carcinoma; tumor board recommendation for biopsy to confirm for prognostic information as well as given other concomitant malignancy to exclude alternative histology, although felt less likely metastatic breast given this is been well controlled and primarily bony disease involvement.  We discussed recommendation for systemic chemotherapy given advanced cervical squamous cell carcinoma with cisplatin, paclitaxel and bevacizumab with discontinuation of palbociclib but can continue aromatase inhibitor; taxane may also be active for her concomitant metastatic breast cancer.   - EBUS with biopsy 2/18/21, station 7 consistent with metastatic squamous cell carcinoma of cervical origin.  - STRATA requested on cervical SCC, ERBB3 <5% felt to be subclonal per report, PIK3CA  amplification, GQ8575, KDM6A, FRANK, TMB low, PDL1 high may indicated sensitivity to check point inhibitor in future regimen.  - audiology exam 2/18/21, recommended that she let us know if any perceived change in her hearing throughout treatment and recommend repeat testing throughout to ensure no ototoxicity.  - Kapil germline genetic testing returned negative for mutation, provided to patient.  - cycle 1 day 1 cisplatin paclitaxel and bevacizumab on 02/22/2021  Repeat scans 04/26/2021 showed excellent improvement in thoracic and pelvic lymphadenopathy and primary cervical mass.  She is asymptomatic from this no further bleeding.  Recommended continuation of her current regimen which she is tolerating well with supportive care, IV fluids 3 times weekly per patient preference.  - restaging after cycle 6 she has continued improvement in metastatic cervical squamous cell carcinoma; avidity in left vulvar area diminished associated with known infection.  She has required substantial supportive care on chemotherapy and recommend given sustained improvement on scans continuing and absence of chemotherapy with bevacizumab alone and continue close surveillance.  She would like to continue IV fluids weekly as needed.    We reviewed in detail restaging PET-CT January 2022 notable for uptake in cervical region concerning for oligo progression/recurrence of her disease.  Previously biopsy proven metastatic cervical cancer in lungs without evidence of recurrent mass/lymphadenopathy/avidity in this region or otherwise.    Given prior PDL1 high disease CPS >1 we decided to proceed with chemoimmunotherapy per Keynote 826, carboplatin, paclitaxel and pembrolizumab.   She completed 6 cycles of carboplatin paclitaxel with pembrolizumab (1 cycle without pembrolizumab from unclear reasons with my colleague) with interval brachytherapy with Dr. Vázquez at [facility].    Restaging PET scan with diminished avidity  vaginal cuff status post brachytherapy.  No new areas concerning for active disease.  We discussed consideration of holding further maintenance immunotherapy at this time given no evidence of recurrent / progressive metastatic disease and is amenable to this.  Will continue her breast cancer treatment per below.    Restaging scan  1/11/2022 without evidence of recurrent metastatic disease active.  Continues to follow with Gynecologic Oncology.    Continue current treatment for breast cancer and surveillance for cervical cancer.    # Hypothyroidism: Rising TSH and low free T4.  Increased dose of levothyroxine currently on to 75 mcg daily       Macrocytosis: Possibly related to thyroid disorder will continue to monitor.  She is on vitamin-B supplement.  No new anemia.    # neuropathy:  Mild, will monitor with re-initiation of chemotherapy per above.    # metastatic breast cancer ER positive HER2 negative:  Had been on systemic therapy with palbociclib and letrozole.  Tolerating well.  Had been on hold with ENT issues okay to restart at this time.  She understands if concern for active infection to notify us as may need to be held.    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    # history of papillary thyroid carcinoma status post total thyroidectomy on 08/31/2017: s/p total thyroidectomy on levothyroxine.     # tobacco abuse:  Current tobacco use.   I have counseled for at least 3 min on the importance of tobacco cessation and discussed pharmacologic and therapy options to  aid in cessation.  She is interested in trial of bupropion prescribed to her local pharmacy.    Follow-Up:   Restart Ibrance   Continue to follow with ENT    Route Chart for Scheduling    Med Onc Chart Routing      Follow up with physician 3 months.   Follow up with MIGUEL    Infusion scheduling note    Injection scheduling note    Labs CBC, CMP, TSH and free T4   Lab interval: every  12 weeks     Imaging    Pharmacy appointment    Other referrals          Therapy Plan Information  IV Fluids  sodium chloride 0.9% bolus 1,000 mL  1,000 mL, Intravenous, PRN  Flushes  sodium chloride 0.9% flush 10 mL  10 mL, Intravenous, PRN  heparin, porcine (PF) 100 unit/mL injection flush 500 Units  500 Units, Intravenous, PRN

## 2023-01-17 ENCOUNTER — SPECIALTY PHARMACY (OUTPATIENT)
Dept: PHARMACY | Facility: CLINIC | Age: 53
End: 2023-01-17
Payer: MEDICAID

## 2023-01-17 DIAGNOSIS — C50.811 MALIGNANT NEOPLASM OF OVERLAPPING SITES OF RIGHT BREAST IN FEMALE, ESTROGEN RECEPTOR POSITIVE: Primary | ICD-10-CM

## 2023-01-17 DIAGNOSIS — Z17.0 MALIGNANT NEOPLASM OF OVERLAPPING SITES OF RIGHT BREAST IN FEMALE, ESTROGEN RECEPTOR POSITIVE: Primary | ICD-10-CM

## 2023-01-17 NOTE — TELEPHONE ENCOUNTER
Specialty Pharmacy - Refill Coordination    Specialty Medication Orders Linked to Encounter      Flowsheet Row Most Recent Value   Medication #1 palbociclib (IBRANCE) 125 mg Cap (Order#[id number], Rx#[id number])            Refill Questions - Documented Responses      Flowsheet Row Most Recent Value   Patient Availability and HIPAA Verification    Does patient want to proceed with activity? Yes   HIPAA/medical authority confirmed? Yes   Relationship to patient of person spoken to? Self   Refill Screening Questions    Changes to allergies? No   Changes to medications? No   New conditions since last clinic visit? No   Unplanned office visit, urgent care, ED, or hospital admission in the last 4 weeks? No   How does patient/caregiver feel medication is working? Good   Financial problems or insurance changes? No   How many doses of your specialty medications were missed in the last 4 weeks? 0   Would patient like to speak to a pharmacist? No   When does the patient need to receive the medication? 01/19/23   Refill Delivery Questions    How will the patient receive the medication? MEDRx   When does the patient need to receive the medication? 01/19/23   Shipping Address Home   Address in [facility] confirmed and updated if neccessary? Yes   Expected Copay ($) 0   Is the patient able to afford the medication copay? Yes   Payment Method zero copay   Days supply of Refill 28   Supplies needed? No supplies needed   Refill activity completed? Yes   Refill activity plan Refill scheduled   Shipment/Pickup Date: 01/18/23            Current Outpatient Medications   Medication Sig    albuterol (PROVENTIL/VENTOLIN HFA) 90 mcg/actuation inhaler Inhale 2 puffs into the lungs every 4 (four) hours as needed for Wheezing or Shortness of Breath.    ALPRAZolam (XANAX) 1 MG tablet Take 0.5-1 tablets (0.5-1 mg total) by mouth 2 (two) times daily as needed for Anxiety.    buPROPion (WELLBUTRIN SR) 150 MG TBSR 12 hr tablet Take 1 tablet  (150 mg total) by mouth 2 (two) times daily. Take 1 tablet (150mg) once daily for 1 week then increase to twice daily    calcium carbonate 400 mg calcium (1,000 mg) Chew Take 2,000 mg by mouth once daily.    cyanocobalamin (VITAMIN B-12) 1000 MCG tablet Take 1 tablet (1,000 mcg total) by mouth once daily.    diphenoxylate-atropine 2.5-0.025 mg (LOMOTIL) 2.5-0.025 mg per tablet Take 1 tablet by mouth 4 (four) times daily as needed for Diarrhea.    ergocalciferol (VITAMIN D2) 50,000 unit Cap Take 5,000 Units by mouth once daily at 6am.     HYDROcodone-acetaminophen (NORCO)  mg per tablet Take 1 tablet by mouth every 4 (four) hours as needed for Pain. No more than 4 doses/ day    hydrOXYzine HCL (ATARAX) 25 MG tablet Take 1 tablet (25 mg total) by mouth 3 (three) times daily as needed for Itching.    ipratropium (ATROVENT) 42 mcg (0.06 %) nasal spray 2 sprays by Nasal route 4 (four) times daily. As needed for rhinitis. Take in evening before bed and in the morning    letrozole (FEMARA) 2.5 mg Tab Take 1 tablet (2.5 mg total) by mouth once daily.    levothyroxine (SYNTHROID) 200 MCG tablet Take 1 tablet (200 mcg total) by mouth once daily.    levothyroxine (SYNTHROID) 75 MCG tablet Take 1 tablet (75 mcg total) by mouth before breakfast.    morphine (MS CONTIN) 15 MG 12 hr tablet Take 1 tablet (15 mg total) by mouth 2 (two) times daily.    multivitamin (THERAGRAN) per tablet Take 1 tablet by mouth once daily.    naloxone (NARCAN) 4 mg/actuation Spry SMARTSIG:Both Nares    palbociclib (IBRANCE) 125 mg Cap Take one capsule (125 mg) by mouth once daily on days 1-21 of each 28-day cycle.    pentoxifylline (TRENTAL) 400 mg TbSR Take 1 tablet (400 mg total) by mouth 2 (two) times a day.    potassium chloride SA (K-DUR,KLOR-CON) 20 MEQ tablet Take 1 tablet (20 mEq total) by mouth once daily. Take daily for 3 days.    vitamin E 1000 UNIT capsule Take 1 capsule (1,000 Units total) by mouth once daily.   Last reviewed on  1/13/2023  3:24 PM by Estefani Asencio MD    Review of patient's allergies indicates:   Allergen Reactions    Gabapentin Swelling     Note: unilateral joint edema, unclear if due to gabapentin    Nsaids (non-steroidal anti-inflammatory drug) Other (See Comments)     Instructed not to take NSAID drugs with chemo pill.    Clindamycin Rash    Vancomycin analogues Itching    Last reviewed on  1/13/2023 3:24 PM by Estefani Asencio      Tasks added this encounter   2/9/2023 - Refill Call (Auto Added)   Tasks due within next 3 months   No tasks due.     Kyara Jimenez, PharmD  [facility]  [address] [facility] [address] [address]  Phone: [phone]  Fax: [phone]

## 2023-01-18 LAB — FUNGUS SPEC CULT: NORMAL

## 2023-01-20 RX ORDER — SODIUM CHLORIDE 0.9 % (FLUSH) 0.9 %
10 SYRINGE (ML) INJECTION
Status: CANCELLED | OUTPATIENT
Start: 2023-01-20

## 2023-01-20 RX ORDER — HEPARIN 100 UNIT/ML
500 SYRINGE INTRAVENOUS
Status: CANCELLED | OUTPATIENT
Start: 2023-01-20

## 2023-01-23 ENCOUNTER — TELEPHONE (OUTPATIENT)
Dept: INFUSION THERAPY | Facility: HOSPITAL | Age: 53
End: 2023-01-23

## 2023-01-23 NOTE — TELEPHONE ENCOUNTER
Appointment rescheduled for Wed 1/25/23 1400 for port flush pt was unable to make it to appt today

## 2023-01-24 ENCOUNTER — INFUSION (OUTPATIENT)
Dept: INFUSION THERAPY | Facility: HOSPITAL | Age: 53
End: 2023-01-24
Attending: INTERNAL MEDICINE
Payer: MEDICAID

## 2023-01-24 VITALS
OXYGEN SATURATION: 98 % | TEMPERATURE: 98 F | HEART RATE: 101 BPM | DIASTOLIC BLOOD PRESSURE: 83 MMHG | SYSTOLIC BLOOD PRESSURE: 125 MMHG | RESPIRATION RATE: 18 BRPM

## 2023-01-24 DIAGNOSIS — Z17.0 MALIGNANT NEOPLASM OF OVERLAPPING SITES OF RIGHT BREAST IN FEMALE, ESTROGEN RECEPTOR POSITIVE: ICD-10-CM

## 2023-01-24 DIAGNOSIS — C50.811 MALIGNANT NEOPLASM OF OVERLAPPING SITES OF RIGHT BREAST IN FEMALE, ESTROGEN RECEPTOR POSITIVE: ICD-10-CM

## 2023-01-24 DIAGNOSIS — C50.919 PRIMARY MALIGNANT NEOPLASM OF BREAST WITH METASTASIS TO OTHER SITE, UNSPECIFIED LATERALITY: Primary | ICD-10-CM

## 2023-01-24 PROCEDURE — 63600175 PHARM REV CODE 636 W HCPCS: Performed by: INTERNAL MEDICINE

## 2023-01-24 PROCEDURE — 96523 IRRIG DRUG DELIVERY DEVICE: CPT

## 2023-01-24 PROCEDURE — A4216 STERILE WATER/SALINE, 10 ML: HCPCS | Performed by: INTERNAL MEDICINE

## 2023-01-24 PROCEDURE — 25000003 PHARM REV CODE 250: Performed by: INTERNAL MEDICINE

## 2023-01-24 RX ORDER — SODIUM CHLORIDE 0.9 % (FLUSH) 0.9 %
10 SYRINGE (ML) INJECTION
Status: CANCELLED | OUTPATIENT
Start: 2023-01-24

## 2023-01-24 RX ORDER — HEPARIN 100 UNIT/ML
500 SYRINGE INTRAVENOUS
Status: DISCONTINUED | OUTPATIENT
Start: 2023-01-24 | End: 2023-01-24 | Stop reason: HOSPADM

## 2023-01-24 RX ORDER — HEPARIN 100 UNIT/ML
500 SYRINGE INTRAVENOUS
Status: CANCELLED | OUTPATIENT
Start: 2023-01-24

## 2023-01-24 RX ORDER — SODIUM CHLORIDE 0.9 % (FLUSH) 0.9 %
10 SYRINGE (ML) INJECTION
Status: DISCONTINUED | OUTPATIENT
Start: 2023-01-24 | End: 2023-01-24 | Stop reason: HOSPADM

## 2023-01-24 RX ADMIN — Medication 10 ML: at 02:01

## 2023-01-24 RX ADMIN — HEPARIN 500 UNITS: 100 SYRINGE at 02:01

## 2023-01-24 NOTE — DISCHARGE INSTRUCTIONS
[facility]  [address] [address]  [address]  [phone] phone     [phone] fax  Hours of Operation: Monday- Friday 8:00am- 5:00pm  After hours phone  [phone]  Hematology / Oncology Physicians on call      PRASANNA Godinez Dr., Dr., NP Sydney Prescott, VINNY Godoy FNP    Please call with any concerns regarding your appointment today.

## 2023-01-24 NOTE — NURSING
Patient here today for port flush. PAC accessed via sterile technique with 20g 1 in tomlin needle without difficulty.  Positive blood return noted.  Flushed per protocol NS and Heparin 500 units as documented on MAR. Tomlin needle removed and intact. Patient tolerated well. Band-Aid applied to site. Vital signs and assessment documented. Patient confirmed next appt and ambulated out of treatment room.

## 2023-01-25 ENCOUNTER — TELEPHONE (OUTPATIENT)
Dept: GYNECOLOGIC ONCOLOGY | Facility: CLINIC | Age: 53
End: 2023-01-25
Payer: MEDICAID

## 2023-01-25 NOTE — TELEPHONE ENCOUNTER
Call no answer appointment cancel the provider will no longer see patient in [place]. A letter was sent out to call and reschedule

## 2023-02-09 ENCOUNTER — PATIENT MESSAGE (OUTPATIENT)
Dept: PHARMACY | Facility: CLINIC | Age: 53
End: 2023-02-09
Payer: MEDICAID

## 2023-02-14 ENCOUNTER — SPECIALTY PHARMACY (OUTPATIENT)
Dept: PHARMACY | Facility: CLINIC | Age: 53
End: 2023-02-14
Payer: MEDICAID

## 2023-02-14 ENCOUNTER — OFFICE VISIT (OUTPATIENT)
Dept: PALLIATIVE MEDICINE | Facility: CLINIC | Age: 53
End: 2023-02-14
Payer: MEDICAID

## 2023-02-14 VITALS
HEIGHT: 67 IN | DIASTOLIC BLOOD PRESSURE: 68 MMHG | WEIGHT: 211.63 LBS | RESPIRATION RATE: 18 BRPM | TEMPERATURE: 98 F | HEART RATE: 75 BPM | OXYGEN SATURATION: 98 % | BODY MASS INDEX: 33.21 KG/M2 | SYSTOLIC BLOOD PRESSURE: 102 MMHG

## 2023-02-14 DIAGNOSIS — C50.811 MALIGNANT NEOPLASM OF OVERLAPPING SITES OF RIGHT BREAST IN FEMALE, ESTROGEN RECEPTOR POSITIVE: Primary | ICD-10-CM

## 2023-02-14 DIAGNOSIS — Z17.0 MALIGNANT NEOPLASM OF OVERLAPPING SITES OF RIGHT BREAST IN FEMALE, ESTROGEN RECEPTOR POSITIVE: Primary | ICD-10-CM

## 2023-02-14 DIAGNOSIS — C53.0 MALIGNANT NEOPLASM OF ENDOCERVIX: ICD-10-CM

## 2023-02-14 DIAGNOSIS — F41.9 ANXIETY: ICD-10-CM

## 2023-02-14 DIAGNOSIS — G89.3 NEOPLASM RELATED PAIN: ICD-10-CM

## 2023-02-14 DIAGNOSIS — Z51.5 ENCOUNTER FOR PALLIATIVE CARE: ICD-10-CM

## 2023-02-14 PROCEDURE — 3008F BODY MASS INDEX DOCD: CPT | Mod: CPTII,,, | Performed by: NURSE PRACTITIONER

## 2023-02-14 PROCEDURE — 3074F PR MOST RECENT SYSTOLIC BLOOD PRESSURE < 130 MM HG: ICD-10-PCS | Mod: CPTII,,, | Performed by: NURSE PRACTITIONER

## 2023-02-14 PROCEDURE — 3078F DIAST BP <80 MM HG: CPT | Mod: CPTII,,, | Performed by: NURSE PRACTITIONER

## 2023-02-14 PROCEDURE — 3074F SYST BP LT 130 MM HG: CPT | Mod: CPTII,,, | Performed by: NURSE PRACTITIONER

## 2023-02-14 PROCEDURE — 3078F PR MOST RECENT DIASTOLIC BLOOD PRESSURE < 80 MM HG: ICD-10-PCS | Mod: CPTII,,, | Performed by: NURSE PRACTITIONER

## 2023-02-14 PROCEDURE — 1160F RVW MEDS BY RX/DR IN RCRD: CPT | Mod: CPTII,,, | Performed by: NURSE PRACTITIONER

## 2023-02-14 PROCEDURE — 1159F PR MEDICATION LIST DOCUMENTED IN MEDICAL RECORD: ICD-10-PCS | Mod: CPTII,,, | Performed by: NURSE PRACTITIONER

## 2023-02-14 PROCEDURE — 99999 PR PBB SHADOW E&M-EST. PATIENT-LVL V: CPT | Mod: PBBFAC,,, | Performed by: NURSE PRACTITIONER

## 2023-02-14 PROCEDURE — 99214 PR OFFICE/OUTPT VISIT, EST, LEVL IV, 30-39 MIN: ICD-10-PCS | Mod: S$PBB,,, | Performed by: NURSE PRACTITIONER

## 2023-02-14 PROCEDURE — 1159F MED LIST DOCD IN RCRD: CPT | Mod: CPTII,,, | Performed by: NURSE PRACTITIONER

## 2023-02-14 PROCEDURE — 99215 OFFICE O/P EST HI 40 MIN: CPT | Mod: PBBFAC | Performed by: NURSE PRACTITIONER

## 2023-02-14 PROCEDURE — 99214 OFFICE O/P EST MOD 30 MIN: CPT | Mod: S$PBB,,, | Performed by: NURSE PRACTITIONER

## 2023-02-14 PROCEDURE — 3008F PR BODY MASS INDEX (BMI) DOCUMENTED: ICD-10-PCS | Mod: CPTII,,, | Performed by: NURSE PRACTITIONER

## 2023-02-14 PROCEDURE — 1160F PR REVIEW ALL MEDS BY PRESCRIBER/CLIN PHARMACIST DOCUMENTED: ICD-10-PCS | Mod: CPTII,,, | Performed by: NURSE PRACTITIONER

## 2023-02-14 PROCEDURE — 99999 PR PBB SHADOW E&M-EST. PATIENT-LVL V: ICD-10-PCS | Mod: PBBFAC,,, | Performed by: NURSE PRACTITIONER

## 2023-02-14 NOTE — TELEPHONE ENCOUNTER
Specialty Pharmacy - Refill Coordination    Specialty Medication Orders Linked to Encounter      Flowsheet Row Most Recent Value   Medication #1 palbociclib (IBRANCE) 125 mg Cap (Order#[id number], Rx#[id number])            Refill Questions - Documented Responses      Flowsheet Row Most Recent Value   Patient Availability and HIPAA Verification    HIPAA/medical authority confirmed? Yes   Relationship to patient of person spoken to? Self   Refill Screening Questions    Changes to allergies? No   Changes to medications? No   New conditions since last clinic visit? No   Unplanned office visit, urgent care, ED, or hospital admission in the last 4 weeks? No   How does patient/caregiver feel medication is working? Good   Financial problems or insurance changes? No   How many doses of your specialty medications were missed in the last 4 weeks? 0   Would patient like to speak to a pharmacist? No   When does the patient need to receive the medication? 02/22/23   Refill Delivery Questions    How will the patient receive the medication? [facility]   When does the patient need to receive the medication? 02/22/23   Shipping Address Home   Address in [facility] confirmed and updated if neccessary? Yes   Expected Copay ($) 0   Is the patient able to afford the medication copay? Yes   Payment Method zero copay   Days supply of Refill 28   Supplies needed? No supplies needed   Refill activity completed? Yes   Refill activity plan Refill scheduled   Shipment/Pickup Date: 02/16/23            Current Outpatient Medications   Medication Sig    albuterol (PROVENTIL/VENTOLIN HFA) 90 mcg/actuation inhaler Inhale 2 puffs into the lungs every 4 (four) hours as needed for Wheezing or Shortness of Breath.    ALPRAZolam (XANAX) 1 MG tablet Take 0.5-1 tablets (0.5-1 mg total) by mouth 2 (two) times daily as needed for Anxiety.    buPROPion (WELLBUTRIN SR) 150 MG TBSR 12 hr tablet Take 1 tablet (150 mg total) by mouth 2 (two) times daily. Take 1  tablet (150mg) once daily for 1 week then increase to twice daily    calcium carbonate 400 mg calcium (1,000 mg) Chew Take 2,000 mg by mouth once daily.    cyanocobalamin (VITAMIN B-12) 1000 MCG tablet Take 1 tablet (1,000 mcg total) by mouth once daily.    diphenoxylate-atropine 2.5-0.025 mg (LOMOTIL) 2.5-0.025 mg per tablet Take 1 tablet by mouth 4 (four) times daily as needed for Diarrhea.    ergocalciferol (VITAMIN D2) 50,000 unit Cap Take 5,000 Units by mouth once daily at 6am.     HYDROcodone-acetaminophen (NORCO)  mg per tablet Take 1 tablet by mouth every 4 (four) hours as needed for Pain. No more than 4 doses/ day    hydrOXYzine HCL (ATARAX) 25 MG tablet Take 1 tablet (25 mg total) by mouth 3 (three) times daily as needed for Itching.    ipratropium (ATROVENT) 42 mcg (0.06 %) nasal spray 2 sprays by Nasal route 4 (four) times daily. As needed for rhinitis. Take in evening before bed and in the morning    letrozole (FEMARA) 2.5 mg Tab Take 1 tablet (2.5 mg total) by mouth once daily.    levothyroxine (SYNTHROID) 200 MCG tablet Take 1 tablet (200 mcg total) by mouth once daily.    levothyroxine (SYNTHROID) 75 MCG tablet Take 1 tablet (75 mcg total) by mouth before breakfast.    morphine (MS CONTIN) 15 MG 12 hr tablet Take 1 tablet (15 mg total) by mouth 2 (two) times daily.    multivitamin (THERAGRAN) per tablet Take 1 tablet by mouth once daily.    naloxone (NARCAN) 4 mg/actuation Spry SMARTSIG:Both Nares    palbociclib (IBRANCE) 125 mg Cap Take one capsule (125 mg) by mouth once daily on days 1-21 of each 28-day cycle.    pentoxifylline (TRENTAL) 400 mg TbSR Take 1 tablet (400 mg total) by mouth 2 (two) times a day.    potassium chloride SA (K-DUR,KLOR-CON) 20 MEQ tablet Take 1 tablet (20 mEq total) by mouth once daily. Take daily for 3 days.    vitamin E 1000 UNIT capsule Take 1 capsule (1,000 Units total) by mouth once daily.   Last reviewed on 2/14/2023  2:05 PM by Georgie Johnson NP    Review of  patient's allergies indicates:   Allergen Reactions    Gabapentin Swelling     Note: unilateral joint edema, unclear if due to gabapentin    Nsaids (non-steroidal anti-inflammatory drug) Other (See Comments)     Instructed not to take NSAID drugs with chemo pill.    Clindamycin Rash    Vancomycin analogues Itching    Last reviewed on  2/14/2023 2:05 PM by Georgie Johnson      Tasks added this encounter   3/15/2023 - Refill Call (Auto Added)   Tasks due within next 3 months   4/29/2023 - Clinical - Follow Up Assesement (180 day)     [facility]  [address]  [facility] [address]  Phone: [phone]  Fax: [phone]         <<----- Click to add NO pertinent Past Medical History No pertinent past medical history

## 2023-02-14 NOTE — PROGRESS NOTES
Palliative Medicine         Follow up    SUBJECTIVE:   Chief complaint: pain follow up    02/14/2023:  Patient comes to clinic with her significant other Alessio. We discussed her recent visits with Dr. Asencio and Deo. She is in good spirits as her Restaging PET scan 1/11/2023 without evidence of recurrent metastatic disease active and no new areas concerning for active disease. She noted she was instructed to continue current treatment for breast cancer and surveillance for cervical cancer. She denies any issues and notes that she continues to be active and her and significant other are going out to eat after visit. She notes that she has intermittent pelvic pain in which she attributes to radiation but notes that current pain medication effective in managing. She notes that it does not happen often. In the setting of stable scans and continued improvement clinically, we dicussed continuing opoid wean. Patient currently taking MSER 15mg po BID and taking 3-4 doses of Norco daily. Will decrease MSER to 15mg po Daily and continue current hydrocodone regimen not to exceed 4 doses in a day. Will follow up in 4 weeks to continue opoid wean. No new scripts needed as MSER and hydrocodone were just filled on 2/8/23.     Past visits:  12/1/22: Patient comes to clinic with her significant other Alessio. She is still feeling sad because of her grand daughter since she moved out. She noted that she stopped taking her Wellbutrin but wants to start back taking it to help with her mood also to help her with the process of quitting smoking. Also she notes she has been dealing with worsening pain to her left ear. She saw Otolaryngologist recently and he noted no signs of changes on CT and offered patient surgery or non surgical management. Patient notes that she did not want surgery at that time because her doctor made her aware that the procedure may help but there is a possibility that it may not. She notes that the pain and  drainage has worsened and she is now thinking about proceeding with surgery even if there is a risk that it will not work. She has been in touch with her Otolaryngologist's office to schedule appointment. She last saw Dr. Asencio 10/13/22 in which there were no changes and she was to continue on current treatment withLetrozole and palbociclib. Her visit with Gyn/onc also noted no new changes and everything was stable. During exam patient reports pelvic pain but notes that medication has been effective in managing it. Being that there were no changes with all of her MD visits, I spoke with patient about continuing plan from her previous PM visit with weaning MSER. She is in agreement, we will wean down to MSER 15mg po BID. I did make patient aware that medications provided by me were not for her ear pain or or acute issues she may experience but it is indicated for malignant pain. I reinforced education about taking medications as prescribed and for what it is indicated for. I made her aware that we could not send in another early prescription as my counterpart had to do in November. On 11/1/22,  patient sent a message noting she ran out of her medication before scheduled visit. She reported that she was [facility] due to abdominal pain and then when she was discharged she cut her foot very badly and had to receive stiches. Due to these issues, she used her medications more frequently than she was prescribed. She was taking 2 tabs of MSER 30mg BID instead of 1 BID. She was taking hydrocodone 4-5x/ day. She admitted that she was not supposed to do this and will not do this again. She was hoping that provider would write the 15mg ER tabs to hold her over. She explained why she would not write for the 15mg and typically do not write early refills. This is the second time that she has taken ER medications more frequently than prescribed. She was counseled gain on the risks with this behavior and  this would be her last warning as well as last early refill. Moving forward if she takes opioids other than rx this is grounds for dismissal from our clinic given issues with impulse control and safety. She expressed understanding and said that her  has also scolded her. She knows this was not a good choice and continues to apologize and promised she will not do it again. My partner explained our boundaries in clinic are not punitive but rather in place for her safety. I was present with my counterpart for the conversation and agreed that early refills will be not be permitted moving forward    10/06/2022  She comes to clinic with DEYSI Mccallum today. She is down because her granddaughter is not in a good situation but otherwise feeling okay. Since we last saw each other her pain has improved which is likely related to the positive response to treatment. She would like to begin weaning the MSER back to 30mg BID and hopefully continue to decrease further. She is still taking hydrocodone 10mg QID with good response. Her mood improved with the resumption of Wellbutrin and is still cutting back on her tobacco use. She has been having ear pain which is being evaluated by ENT and is waiting to hear when her CT is scheduled. There seems to have been a charting error with her Letrozole resulting in d/c the order and unable to refill. I will reach out to oncology to reorder. We will follow up in 2 months and she will message me for refills whether she would like to continue weaning before then. We do not have HCPOA on file even though she remembers bringing to clinic. She will bring again next visit and confirms that her sister David is HCPOA.    09/01/22: Ms. Flores comes to clinic today with her fidavid Mccallum. She reports that her lower abdominal pain has worsened since brachytherapy started several weeks ago. She reports spontaneous lower abdominal pain not related to food but sometimes exacerbated by BM. She denies  "constipation. She has taken an extra dose of MSER at bedtime to help with sleep. We talked about the risks associated with this and would advise against doing this again. She is taking hydrocodone q4hr sometimes waking up at night and needing another dose averaging 4-6 in a day. I recommend we increase the long acting dose in hopes for better control and less PRN usage. She says her sleep is poor because this is when the pain intensifies (lying flat) but also anxiety and sadness since her granddaughter moved out of the home. She is taking Xanax HS which helps. She was taking Wellbutrin in the last but not sure why she stopped. For now lets not resume this and evaluate at her next visit.    06/16/2022: Patient doing okay.  No new issues since her last visit.  She continue to have some lower GI pains describes now as "cramping after a BM".  Previously, her discomfort was after eating.  She has been referred to GI.  She has some fatigue but managing.  Pain is controlled on current regimen.  She has upcoming scans and appointment with Dr. Asencio.  She has some anxiety and life stressors affecting her situation.  Her son and his ex-girfriend (now in [place]) can no longer care for their 4 year old daughter.  She and her partner are assuming the parental rights of her granddaughter.  They seem to be coping as well as can be expected.  Overall, she is doing okay.  We will continue to follow at quarterly intervals.  She knows to reach out if her situation changes or symptom burden worsens.    03/10/2022: Overall, patient seems to be doing okay.  She has some new GI issues with abdominal pain after eating and intermittent diarrhea not felt to be related to her disease or treatment.  Oncology discussed GI referral and she is considering this if no improvement.  We are adjusting her pain medication regimen today mostly to cut back on her prn Norco which she continues to use consistently at 5-6 tablets/day.  She knows she can " reach out for additional questions or concerns.     01/06/2022: Patient is a 51 y.o. year old female presenting with for palliative follow up for her metastatic breast cancer, SCC of the cervix. Treatments currently on hold due to recurrent ear infection. She is anxious about results of her recent scan and fearful of disease progression while off treatment.  Otherwise, her pain continues to be controlled on her current regimen.  No new symptoms or complaints today.    ONCOLOGY DIAGNOSES:  Stage IV cervical squamous cell carcinoma   stage IV, T2 N1b M1, invasive breast carcinoma, ER/FL+, HER2-   Papillary thyroid carcinoma status post total thyroidectomy on 08/31/2017, mpT1b N0    Cervical HSIL MIREILLE 3 s/p LEEP, 2017    Patient looks and feels better compared to last visit.  She walked into her appt.  Ear is better and she is being followed by ENT.  She has resumed treatment for both her cervical and breast cancer and is followed closely by medical oncology and gyn oncology.  Pain continues to be controlled on current medication regimen.  She is having knee issues with swelling and likely OA.  We discussed referral to orthopedics.  She also plans to follow up with neurosurgery.  Overall, no major changes and no new issues.     LA  reviewed and summarized:      OBJECTIVE:     ROS:  Review of Systems   Constitutional:  Positive for fatigue. Negative for activity change, appetite change, chills and fever.   HENT:  Negative for mouth sores, sore throat and trouble swallowing.    Respiratory:  Negative for cough and shortness of breath.    Gastrointestinal:  Negative for abdominal distention, abdominal pain, constipation, diarrhea, nausea and vomiting.   Genitourinary:  Positive for pelvic pain. Negative for difficulty urinating and dysuria.   Musculoskeletal:  Negative for back pain and myalgias.   Skin:  Negative for rash and wound.   Allergic/Immunologic: Negative for immunocompromised state.   Neurological:  Negative  for weakness and headaches.   Psychiatric/Behavioral:  Positive for sleep disturbance. Negative for confusion, decreased concentration and dysphoric mood. The patient is nervous/anxious.      Review of Symptoms      Symptom Assessment (ESAS 0-10 Scale)  Pain:  0  Dyspnea:  0  Anxiety:  0  Nausea:  0  Depression:  0  Anorexia:  0  Fatigue:  0  Insomnia:  0  Restlessness:  0  Agitation:  0     CAM / Delirium:  Negative  Constipation:  Negative  Diarrhea:  Negative    Anxiety:  Is nervous/anxious  Constipation:  No constipation    Bowel Management Plan (BMP):  Yes      Pain Assessment:    Location(s): ear    Ear       Location: left        Quality: None        Quantity: 8/10 in intensity        Chronicity: Onset 4 week(s) ago, unchanged        Aggravating Factors: none        Alleviating Factors: opiates        Associated Symptoms: none    ECOG Performance Status stGstrstastdstest:st st1st Living Arrangements:  Lives with spouse and Lives in home    Psychosocial/Cultural:   See Palliative Psychosocial Note: No  Lives with her significant other of 16 years: they have 3 children combined, 2 sons from previous marriage. Granddaughter recently moved out of the home  **Primary  to Follow**  Palliative Care  Consult: No    Advance Care Planning   Advance Directives:   Living Will: Yes        Copy on chart: Yes    LaPOST: Yes    Do Not Resuscitate Status: Yes    Agent's Name:  David Acevedo, sister, [phone]    Decision Making:  Patient answered questions  Goals of Care: The patient endorses that what is most important right now is to focus on remaining as independent as possible, symptom/pain control, and curative/life-prolongation (regardless of treatment burdens)    Accordingly, we have decided that the best plan to meet the patient's goals includes continuing with treatment        Physical Exam:  Vitals: Temp: 98.1 °F (36.7 °C) (02/14/23 1308)  Pulse: 75 (02/14/23 1308)  Resp: 18 (02/14/23 1308)  BP: 102/68  (02/14/23 1308)  SpO2: 98 % (02/14/23 1308)  Physical Exam  Vitals and nursing note reviewed.   Constitutional:       General: She is not in acute distress.     Appearance: Normal appearance. She is obese. She is ill-appearing (chronic).   HENT:      Head: Normocephalic and atraumatic.   Eyes:      Pupils: Pupils are equal, round, and reactive to light.   Cardiovascular:      Rate and Rhythm: Normal rate and regular rhythm.      Pulses: Normal pulses.      Heart sounds: Normal heart sounds. No murmur heard.  Pulmonary:      Effort: Pulmonary effort is normal. No respiratory distress.      Breath sounds: Normal breath sounds.   Abdominal:      General: Bowel sounds are normal. There is no distension.      Palpations: Abdomen is soft.      Tenderness: There is no abdominal tenderness.   Musculoskeletal:         General: Normal range of motion.      Cervical back: Normal range of motion.      Right lower leg: No edema.      Left lower leg: No edema.   Skin:     General: Skin is warm and dry.   Neurological:      Mental Status: She is alert and oriented to person, place, and time. Mental status is at baseline.      Cranial Nerves: No cranial nerve deficit.   Psychiatric:         Mood and Affect: Mood normal.         Behavior: Behavior normal.         Thought Content: Thought content normal.         Judgment: Judgment normal.       Radiology and laboratory data reviewed    ASSESSMENT/PLAN:   **Neoplasm related pain   -Continue weaning MSER down from 15mg BID to daily. Recent scans stable and pain improving as well.  -Continue hydrocodone 10mg QID PRN not to exceed 4 doses in a day  -No scripts needed at visit as medication was filled on 2/8/23    Pain contract- 9/1/22  UDS collected- 10/6/22  Narcan rx- 9/1/22    **Malignant neoplasm of overlapping sites of right breast in female, estrogen receptor positive  -Followed by Dr. Asencio  -Restaging scan  1/11/2022 without evidence of recurrent metastatic disease active.    -Current treatment: Ibrance    **Malignant neoplasm of endocervix  -Followed by Dr.Torres isaac JUAN FRANCISCO, follow up in 3 months   -Restaging scan 1/11/2022 without evidence of recurrent metastatic disease active.  -Holding further maintenance immunotherapy at this time given no evidence of recurrent / progressive metastatic disease    -surveillance for cervical cancer.     **Anxiety  -Continue Xanax 0.5mg-1mg BID PRN    **Palliative Care  -ACP (advance care planning)  -DNR; LaPOST completed and uploaded in EMR  -Surrogate:  David Acevedo, sister, at [phone]. She will bring copy of HCPOA at next visit. Unfortunately this has not been scanned in when she brought it in the past.     Follow up: 4 weeks to continue opioid wean    35 minutes of total time spent on the encounter, which includes face to face time and non-face to face time preparing to see the patient (eg, review of tests), Obtaining and/or reviewing separately obtained history, Documenting clinical information in the electronic or other health record, Independently interpreting results (not separately reported) and communicating results to the patient/family/caregiver, or Care coordination (not separately reported).    Signature: Georgie Johnson NP

## 2023-02-14 NOTE — PATIENT INSTRUCTIONS
Will continue opioid wean. Decrease frequency of MS contin from 15mg po bid to 15mg once a day. Continue prn Norco as prescribed not to exceed 4 doses in a day.

## 2023-03-01 ENCOUNTER — OFFICE VISIT (OUTPATIENT)
Dept: OTOLARYNGOLOGY | Facility: CLINIC | Age: 53
End: 2023-03-01
Payer: MEDICAID

## 2023-03-01 VITALS — BODY MASS INDEX: 33.94 KG/M2 | WEIGHT: 216.25 LBS | HEIGHT: 67 IN

## 2023-03-01 DIAGNOSIS — I96: Primary | ICD-10-CM

## 2023-03-01 DIAGNOSIS — H92.22 BLEEDING FROM LEFT EAR: ICD-10-CM

## 2023-03-01 PROCEDURE — 99999 PR PBB SHADOW E&M-EST. PATIENT-LVL III: CPT | Mod: PBBFAC,,, | Performed by: PHYSICIAN ASSISTANT

## 2023-03-01 PROCEDURE — 1159F PR MEDICATION LIST DOCUMENTED IN MEDICAL RECORD: ICD-10-PCS | Mod: CPTII,,, | Performed by: PHYSICIAN ASSISTANT

## 2023-03-01 PROCEDURE — 3008F BODY MASS INDEX DOCD: CPT | Mod: CPTII,,, | Performed by: PHYSICIAN ASSISTANT

## 2023-03-01 PROCEDURE — 3008F PR BODY MASS INDEX (BMI) DOCUMENTED: ICD-10-PCS | Mod: CPTII,,, | Performed by: PHYSICIAN ASSISTANT

## 2023-03-01 PROCEDURE — 99213 PR OFFICE/OUTPT VISIT, EST, LEVL III, 20-29 MIN: ICD-10-PCS | Mod: S$PBB,,, | Performed by: PHYSICIAN ASSISTANT

## 2023-03-01 PROCEDURE — 99213 OFFICE O/P EST LOW 20 MIN: CPT | Mod: PBBFAC | Performed by: PHYSICIAN ASSISTANT

## 2023-03-01 PROCEDURE — 99999 PR PBB SHADOW E&M-EST. PATIENT-LVL III: ICD-10-PCS | Mod: PBBFAC,,, | Performed by: PHYSICIAN ASSISTANT

## 2023-03-01 PROCEDURE — 99213 OFFICE O/P EST LOW 20 MIN: CPT | Mod: S$PBB,,, | Performed by: PHYSICIAN ASSISTANT

## 2023-03-01 PROCEDURE — 1159F MED LIST DOCD IN RCRD: CPT | Mod: CPTII,,, | Performed by: PHYSICIAN ASSISTANT

## 2023-03-01 NOTE — PROGRESS NOTES
Subjective:       Patient ID: Josey Flores is a 52 y.o. female.    Chief Complaint: Other (Left ear bleeding yesterday - also very itchy)    Patient is a pleasant 52 year old female here to see me today for evaluation of LEFT ear bleeding; yesterday after using a Qtip.  Has since stopped bleeding.  No recent ear pain or drainage.  She has not used any recent ear drops or powder in the ear.  She has had intermittent itching in the ear.    10/20/2022: with hx of BRCA s/p chemotherapy.  She has a history of osteonecrosis of the the left EAC, thought to be as a result bisphosphonate use.  Here with Dr. Wu in 12/2022 and started on Pentoxifylline.    Review of Systems   Constitutional:  Negative for fever.   HENT:  Positive for ear discharge (left bleeding). Negative for ear pain.        Objective:      Physical Exam  Constitutional:       Appearance: Normal appearance.   HENT:      Head: Normocephalic and atraumatic.      Right Ear: External ear normal.      Ears:      Comments: AS: inferior and posterior Erosion of EAC with exposure of mastoid air cells.  Eroded cavity with scant clotted blood. No rosa purulence or drainage, no change from previous examinations.       Nose: Nose normal.      Mouth/Throat:      Mouth: Mucous membranes are moist.   Pulmonary:      Effort: Pulmonary effort is normal.   Neurological:      General: No focal deficit present.      Mental Status: She is alert.       Assessment:       Problem List Items Addressed This Visit    None  Visit Diagnoses       Osteonecrosis of external ear canal, left    -  Primary    Bleeding from left ear                  Plan:           In summary this is a pleasant 52 y.o. 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  She reports doing very well since her last visit with Dr. Wu in 12/2022.  No recent ear pain and drainage other than bleeding yesterday after using Qtip to scratch in the left ear.   On exam the site appears visually unchanged from prior examinations with scant clotted blood in inferior EAC.  Advised against using Qtips or fingernails to scratch in the ear.  Can use Afrin nasal decongestant spray if any further active bleeding.  Will schedule her followup with Dr. Wu in 4 weeks as they discussed possible surgery if no improvement with combination pentoxyfiline and Tocopherol daily.

## 2023-03-07 ENCOUNTER — INFUSION (OUTPATIENT)
Dept: INFUSION THERAPY | Facility: HOSPITAL | Age: 53
End: 2023-03-07
Attending: INTERNAL MEDICINE
Payer: MEDICAID

## 2023-03-07 VITALS
HEART RATE: 69 BPM | RESPIRATION RATE: 18 BRPM | OXYGEN SATURATION: 97 % | DIASTOLIC BLOOD PRESSURE: 81 MMHG | TEMPERATURE: 98 F | SYSTOLIC BLOOD PRESSURE: 123 MMHG

## 2023-03-07 DIAGNOSIS — C50.811 MALIGNANT NEOPLASM OF OVERLAPPING SITES OF RIGHT BREAST IN FEMALE, ESTROGEN RECEPTOR POSITIVE: ICD-10-CM

## 2023-03-07 DIAGNOSIS — Z17.0 MALIGNANT NEOPLASM OF OVERLAPPING SITES OF RIGHT BREAST IN FEMALE, ESTROGEN RECEPTOR POSITIVE: ICD-10-CM

## 2023-03-07 DIAGNOSIS — C50.919 PRIMARY MALIGNANT NEOPLASM OF BREAST WITH METASTASIS TO OTHER SITE, UNSPECIFIED LATERALITY: Primary | ICD-10-CM

## 2023-03-07 PROCEDURE — 25000003 PHARM REV CODE 250: Performed by: INTERNAL MEDICINE

## 2023-03-07 PROCEDURE — A4216 STERILE WATER/SALINE, 10 ML: HCPCS | Performed by: INTERNAL MEDICINE

## 2023-03-07 PROCEDURE — 96523 IRRIG DRUG DELIVERY DEVICE: CPT

## 2023-03-07 PROCEDURE — 63600175 PHARM REV CODE 636 W HCPCS: Performed by: INTERNAL MEDICINE

## 2023-03-07 RX ORDER — HEPARIN 100 UNIT/ML
500 SYRINGE INTRAVENOUS
Status: CANCELLED | OUTPATIENT
Start: 2023-03-07

## 2023-03-07 RX ORDER — HEPARIN 100 UNIT/ML
500 SYRINGE INTRAVENOUS
Status: DISCONTINUED | OUTPATIENT
Start: 2023-03-07 | End: 2023-03-07 | Stop reason: HOSPADM

## 2023-03-07 RX ORDER — SODIUM CHLORIDE 0.9 % (FLUSH) 0.9 %
10 SYRINGE (ML) INJECTION
Status: CANCELLED | OUTPATIENT
Start: 2023-03-07

## 2023-03-07 RX ORDER — SODIUM CHLORIDE 0.9 % (FLUSH) 0.9 %
10 SYRINGE (ML) INJECTION
Status: DISCONTINUED | OUTPATIENT
Start: 2023-03-07 | End: 2023-03-07 | Stop reason: HOSPADM

## 2023-03-07 RX ADMIN — SODIUM CHLORIDE, PRESERVATIVE FREE 10 ML: 5 INJECTION INTRAVENOUS at 01:03

## 2023-03-07 RX ADMIN — HEPARIN 500 UNITS: 100 SYRINGE at 01:03

## 2023-03-07 NOTE — NURSING
"Pt here for Mediport Flush. Right chestwall mediport accessed with a 20g 1" tomlin via sterile technique.  Excellent blood return noted.  Flushed with 10ml NS and 5 ml heparin solution.  Needle D/C, site without redness, swelling, or drainage noted.  Dressing applied.  Patient tolerated well.  Patient to return to clinic in 8 weeks.  "

## 2023-03-15 ENCOUNTER — SPECIALTY PHARMACY (OUTPATIENT)
Dept: PHARMACY | Facility: CLINIC | Age: 53
End: 2023-03-15
Payer: MEDICAID

## 2023-03-15 ENCOUNTER — PATIENT MESSAGE (OUTPATIENT)
Dept: PHARMACY | Facility: CLINIC | Age: 53
End: 2023-03-15
Payer: MEDICAID

## 2023-03-15 NOTE — TELEPHONE ENCOUNTER
Specialty Pharmacy - Refill Coordination    Specialty Medication Orders Linked to Encounter      Flowsheet Row Most Recent Value   Medication #1 palbociclib (IBRANCE) 125 mg Cap (Order#[id number], Rx#[id number])            Refill Questions - Documented Responses      Flowsheet Row Most Recent Value   Patient Availability and HIPAA Verification    Does patient want to proceed with activity? Yes   HIPAA/medical authority confirmed? Yes   Relationship to patient of person spoken to? Self   Refill Screening Questions    Changes to allergies? No   Changes to medications? No   New conditions since last clinic visit? No   Unplanned office visit, urgent care, ED, or hospital admission in the last 4 weeks? No   How does patient/caregiver feel medication is working? Good   Financial problems or insurance changes? No   How many doses of your specialty medications were missed in the last 4 weeks? 0   Would patient like to speak to a pharmacist? No   When does the patient need to receive the medication? 03/23/23   Refill Delivery Questions    How will the patient receive the medication? [facility]   When does the patient need to receive the medication? 03/23/23   Shipping Address Home   Address in [facility] confirmed and updated if neccessary? Yes   Expected Copay ($) 0   Is the patient able to afford the medication copay? Yes   Payment Method zero copay   Days supply of Refill 28   Supplies needed? No supplies needed   Refill activity completed? Yes   Refill activity plan Refill scheduled   Shipment/Pickup Date: 03/16/23            Current Outpatient Medications   Medication Sig    albuterol (PROVENTIL/VENTOLIN HFA) 90 mcg/actuation inhaler Inhale 2 puffs into the lungs every 4 (four) hours as needed for Wheezing or Shortness of Breath.    ALPRAZolam (XANAX) 1 MG tablet Take 0.5-1 tablets (0.5-1 mg total) by mouth 2 (two) times daily as needed for Anxiety.    buPROPion (WELLBUTRIN SR) 150 MG TBSR 12 hr tablet Take 1 tablet  (150 mg total) by mouth 2 (two) times daily. Take 1 tablet (150mg) once daily for 1 week then increase to twice daily    calcium carbonate 400 mg calcium (1,000 mg) Chew Take 2,000 mg by mouth once daily.    cyanocobalamin (VITAMIN B-12) 1000 MCG tablet Take 1 tablet (1,000 mcg total) by mouth once daily.    diphenoxylate-atropine 2.5-0.025 mg (LOMOTIL) 2.5-0.025 mg per tablet Take 1 tablet by mouth 4 (four) times daily as needed for Diarrhea.    ergocalciferol (VITAMIN D2) 50,000 unit Cap Take 5,000 Units by mouth once daily at 6am.     HYDROcodone-acetaminophen (NORCO)  mg per tablet Take 1 tablet by mouth every 4 (four) hours as needed for Pain. No more than 4 doses/ day    hydrOXYzine HCL (ATARAX) 25 MG tablet Take 1 tablet (25 mg total) by mouth 3 (three) times daily as needed for Itching.    ipratropium (ATROVENT) 42 mcg (0.06 %) nasal spray 2 sprays by Nasal route 4 (four) times daily. As needed for rhinitis. Take in evening before bed and in the morning    letrozole (FEMARA) 2.5 mg Tab Take 1 tablet (2.5 mg total) by mouth once daily.    levothyroxine (SYNTHROID) 200 MCG tablet Take 1 tablet (200 mcg total) by mouth once daily.    levothyroxine (SYNTHROID) 75 MCG tablet Take 1 tablet (75 mcg total) by mouth before breakfast.    morphine (MS CONTIN) 15 MG 12 hr tablet Take 1 tablet (15 mg total) by mouth 2 (two) times daily.    multivitamin (THERAGRAN) per tablet Take 1 tablet by mouth once daily.    naloxone (NARCAN) 4 mg/actuation Spry SMARTSIG:Both Nares    palbociclib (IBRANCE) 125 mg Cap Take one capsule (125 mg) by mouth once daily on days 1-21 of each 28-day cycle.    pentoxifylline (TRENTAL) 400 mg TbSR Take 1 tablet (400 mg total) by mouth 2 (two) times a day.    potassium chloride SA (K-DUR,KLOR-CON) 20 MEQ tablet Take 1 tablet (20 mEq total) by mouth once daily. Take daily for 3 days.    vitamin E 1000 UNIT capsule Take 1 capsule (1,000 Units total) by mouth once daily.   Last reviewed on  3/7/2023  1:52 PM by Gisele Crandall RN    Review of patient's allergies indicates:   Allergen Reactions    Gabapentin Swelling     Note: unilateral joint edema, unclear if due to gabapentin    Nsaids (non-steroidal anti-inflammatory drug) Other (See Comments)     Instructed not to take NSAID drugs with chemo pill.    Clindamycin Rash    Vancomycin analogues Itching    Last reviewed on  3/7/2023 1:51 PM by Gisele Crandall      Tasks added this encounter   No tasks added.   Tasks due within next 3 months   4/29/2023 - Clinical - Follow Up Assesement (180 day)  3/15/2023 - Refill Call (Auto Added)     FEDERICO GROVES, PharmD  [facility]  [address] [facility] [address]  Phone: [phone]  Fax: [phone]

## 2023-04-06 ENCOUNTER — PATIENT MESSAGE (OUTPATIENT)
Dept: OTOLARYNGOLOGY | Facility: CLINIC | Age: 53
End: 2023-04-06
Payer: MEDICAID

## 2023-04-06 ENCOUNTER — OFFICE VISIT (OUTPATIENT)
Dept: OTOLARYNGOLOGY | Facility: CLINIC | Age: 53
End: 2023-04-06
Payer: MEDICAID

## 2023-04-06 VITALS
BODY MASS INDEX: 33.98 KG/M2 | SYSTOLIC BLOOD PRESSURE: 115 MMHG | DIASTOLIC BLOOD PRESSURE: 81 MMHG | WEIGHT: 216.94 LBS | TEMPERATURE: 98 F | HEART RATE: 92 BPM

## 2023-04-06 DIAGNOSIS — G89.3 NEOPLASM RELATED PAIN: ICD-10-CM

## 2023-04-06 DIAGNOSIS — H60.42 CHOLESTEATOMA OF LEFT EXTERNAL AUDITORY CANAL: ICD-10-CM

## 2023-04-06 DIAGNOSIS — F41.9 ANXIETY: ICD-10-CM

## 2023-04-06 DIAGNOSIS — H60.42 CHOLESTEATOMA OF EXTERNAL AUDITORY CANAL, LEFT: ICD-10-CM

## 2023-04-06 DIAGNOSIS — I96: Primary | ICD-10-CM

## 2023-04-06 PROCEDURE — 3074F PR MOST RECENT SYSTOLIC BLOOD PRESSURE < 130 MM HG: ICD-10-PCS | Mod: CPTII,,, | Performed by: OTOLARYNGOLOGY

## 2023-04-06 PROCEDURE — 3079F DIAST BP 80-89 MM HG: CPT | Mod: CPTII,,, | Performed by: OTOLARYNGOLOGY

## 2023-04-06 PROCEDURE — 3079F PR MOST RECENT DIASTOLIC BLOOD PRESSURE 80-89 MM HG: ICD-10-PCS | Mod: CPTII,,, | Performed by: OTOLARYNGOLOGY

## 2023-04-06 PROCEDURE — 99214 OFFICE O/P EST MOD 30 MIN: CPT | Mod: PBBFAC | Performed by: OTOLARYNGOLOGY

## 2023-04-06 PROCEDURE — 1159F PR MEDICATION LIST DOCUMENTED IN MEDICAL RECORD: ICD-10-PCS | Mod: CPTII,,, | Performed by: OTOLARYNGOLOGY

## 2023-04-06 PROCEDURE — 3008F PR BODY MASS INDEX (BMI) DOCUMENTED: ICD-10-PCS | Mod: CPTII,,, | Performed by: OTOLARYNGOLOGY

## 2023-04-06 PROCEDURE — 3008F BODY MASS INDEX DOCD: CPT | Mod: CPTII,,, | Performed by: OTOLARYNGOLOGY

## 2023-04-06 PROCEDURE — 99213 PR OFFICE/OUTPT VISIT, EST, LEVL III, 20-29 MIN: ICD-10-PCS | Mod: 25,S$PBB,, | Performed by: OTOLARYNGOLOGY

## 2023-04-06 PROCEDURE — 3074F SYST BP LT 130 MM HG: CPT | Mod: CPTII,,, | Performed by: OTOLARYNGOLOGY

## 2023-04-06 PROCEDURE — 99213 OFFICE O/P EST LOW 20 MIN: CPT | Mod: 25,S$PBB,, | Performed by: OTOLARYNGOLOGY

## 2023-04-06 PROCEDURE — 69220 CLEAN OUT MASTOID CAVITY: CPT | Mod: S$PBB,LT,, | Performed by: OTOLARYNGOLOGY

## 2023-04-06 PROCEDURE — 69220 CLEAN OUT MASTOID CAVITY: CPT | Mod: PBBFAC,LT | Performed by: OTOLARYNGOLOGY

## 2023-04-06 PROCEDURE — 99999 PR PBB SHADOW E&M-EST. PATIENT-LVL IV: CPT | Mod: PBBFAC,,, | Performed by: OTOLARYNGOLOGY

## 2023-04-06 PROCEDURE — 1159F MED LIST DOCD IN RCRD: CPT | Mod: CPTII,,, | Performed by: OTOLARYNGOLOGY

## 2023-04-06 PROCEDURE — 99999 PR PBB SHADOW E&M-EST. PATIENT-LVL IV: ICD-10-PCS | Mod: PBBFAC,,, | Performed by: OTOLARYNGOLOGY

## 2023-04-06 PROCEDURE — 69220 PR DEBRIDE, MASTOID CAVITY, SIMPLE: ICD-10-PCS | Mod: S$PBB,LT,, | Performed by: OTOLARYNGOLOGY

## 2023-04-06 NOTE — TELEPHONE ENCOUNTER
Called and spoke with Samantha at [facility] to refill ear puffer.  Amphotericin/Chloramphenicol/Hydrocortisone; 2 puffs in affected ear BID x 5 days; one additional refill.

## 2023-04-06 NOTE — TELEPHONE ENCOUNTER
Pt is requesting refill of medication she previously received from [facility].  Old rx is scanned into her media tab on 10/12/2021.  Please confirm it is ok to refill and # of refills allowed and I will call in a verbal refill.

## 2023-04-10 ENCOUNTER — PATIENT MESSAGE (OUTPATIENT)
Dept: PALLIATIVE MEDICINE | Facility: CLINIC | Age: 53
End: 2023-04-10
Payer: MEDICAID

## 2023-04-10 RX ORDER — ALPRAZOLAM 1 MG/1
.5-1 TABLET ORAL 2 TIMES DAILY PRN
Qty: 60 TABLET | Refills: 0 | Status: SHIPPED | OUTPATIENT
Start: 2023-04-10 | End: 2023-05-09 | Stop reason: SDUPTHER

## 2023-04-10 RX ORDER — HYDROCODONE BITARTRATE AND ACETAMINOPHEN 10; 325 MG/1; MG/1
1 TABLET ORAL 3 TIMES DAILY PRN
Qty: 90 TABLET | Refills: 0 | Status: SHIPPED | OUTPATIENT
Start: 2023-04-10 | End: 2023-05-03 | Stop reason: SDUPTHER

## 2023-04-10 RX ORDER — MORPHINE SULFATE 15 MG/1
15 TABLET, FILM COATED, EXTENDED RELEASE ORAL 2 TIMES DAILY
Qty: 60 TABLET | Refills: 0 | Status: SHIPPED | OUTPATIENT
Start: 2023-04-10 | End: 2023-05-09 | Stop reason: SDUPTHER

## 2023-04-10 NOTE — PROGRESS NOTES
Subjective:       Patient ID: Josey Flores is a 52 y.o. female.    Chief Complaint: left ear pain     Follow-up     4/06/2023: with hx of BRCA s/p chemotherapy.  She has a history of osteonecrosis of the the left EAC, thought to be as a result bisphosphonate use, she also smokes.     Here today for check up.  Denies pain since last visit, but feels ears are very itchy.      Review of Systems   Constitutional: Negative.    HENT:  Positive for ear discharge, ear pain and facial swelling.    Eyes: Negative.    Respiratory:  Positive for wheezing.    Cardiovascular: Negative.    Gastrointestinal: Negative.    Endocrine: Positive for cold intolerance and heat intolerance.   Genitourinary: Negative.    Musculoskeletal:  Positive for back pain.   Integumentary:  Negative.   Allergic/Immunologic: Negative.    Neurological: Negative.    Hematological: Negative.    Psychiatric/Behavioral:  Positive for decreased concentration and sleep disturbance. The patient is nervous/anxious.        Objective:      Physical Exam  Constitutional:       Appearance: Normal appearance.   HENT:      Head: Atraumatic.      Right Ear: External ear normal.      Left Ear: External ear normal.   Neurological:      Mental Status: She is alert.          inocular Microscopy with  debridement of mastoid and ear canal  Details: binocular microscopy used to examine both ears  Findings  AD: interval development of erosion of inferior EAC with cerumen impaction, this was removed with microinstrumentation.exposure of bone of inferior EAC  AS: inferior and posterior Erosion of EAC with exposure of mastoid air cells.  Eroded cavity filled with keratinaceous moist debris. No roas purulence, no change from previous examinations.  Applied layer of topical cipro/clotrimazole/boric acid and dexamethasone powder to cavity.       Assessment:       Problem List Items Addressed This Visit    None        Plan:         In summary this is a pleasant 52 y.o. 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      Symptomatically stable. However right side appears to be worsening. Pt not interested in surgery at this time.  Recommend repeat debridements     Gilbert Wu MD  Otology, Neurotology, and Skull Base Surgery  Department of Otorhinolaryngology  [facility]

## 2023-04-11 ENCOUNTER — LAB VISIT (OUTPATIENT)
Dept: LAB | Facility: HOSPITAL | Age: 53
End: 2023-04-11
Attending: INTERNAL MEDICINE
Payer: MEDICAID

## 2023-04-11 DIAGNOSIS — C77.3 MALIGNANT NEOPLASM METASTATIC TO LYMPH NODE OF AXILLA: ICD-10-CM

## 2023-04-11 LAB
ALBUMIN SERPL BCP-MCNC: 3.9 G/DL (ref 3.5–5.2)
ALP SERPL-CCNC: 66 U/L (ref 55–135)
ALT SERPL W/O P-5'-P-CCNC: 19 U/L (ref 10–44)
ANION GAP SERPL CALC-SCNC: 10 MMOL/L (ref 8–16)
ANISOCYTOSIS BLD QL SMEAR: SLIGHT
AST SERPL-CCNC: 12 U/L (ref 10–40)
BASOPHILS NFR BLD: 0 % (ref 0–1.9)
BILIRUB SERPL-MCNC: 0.4 MG/DL (ref 0.1–1)
BUN SERPL-MCNC: 22 MG/DL (ref 6–20)
CALCIUM SERPL-MCNC: 9.1 MG/DL (ref 8.7–10.5)
CHLORIDE SERPL-SCNC: 103 MMOL/L (ref 95–110)
CO2 SERPL-SCNC: 25 MMOL/L (ref 23–29)
CREAT SERPL-MCNC: 1.5 MG/DL (ref 0.5–1.4)
DIFFERENTIAL METHOD: ABNORMAL
EOSINOPHIL NFR BLD: 1 % (ref 0–8)
ERYTHROCYTE [DISTWIDTH] IN BLOOD BY AUTOMATED COUNT: 15.4 % (ref 11.5–14.5)
EST. GFR  (NO RACE VARIABLE): 41 ML/MIN/1.73 M^2
GLUCOSE SERPL-MCNC: 127 MG/DL (ref 70–110)
HCT VFR BLD AUTO: 31.8 % (ref 37–48.5)
HGB BLD-MCNC: 11 G/DL (ref 12–16)
IMM GRANULOCYTES # BLD AUTO: ABNORMAL K/UL (ref 0–0.04)
IMM GRANULOCYTES NFR BLD AUTO: ABNORMAL % (ref 0–0.5)
LYMPHOCYTES NFR BLD: 44 % (ref 18–48)
MCH RBC QN AUTO: 38.7 PG (ref 27–31)
MCHC RBC AUTO-ENTMCNC: 34.6 G/DL (ref 32–36)
MCV RBC AUTO: 112 FL (ref 82–98)
MONOCYTES NFR BLD: 3 % (ref 4–15)
NEUTROPHILS NFR BLD: 52 % (ref 38–73)
NRBC BLD-RTO: 0 /100 WBC
OVALOCYTES BLD QL SMEAR: ABNORMAL
PLATELET # BLD AUTO: 191 K/UL (ref 150–450)
PLATELET BLD QL SMEAR: ABNORMAL
PMV BLD AUTO: 11.1 FL (ref 9.2–12.9)
POIKILOCYTOSIS BLD QL SMEAR: SLIGHT
POLYCHROMASIA BLD QL SMEAR: ABNORMAL
POTASSIUM SERPL-SCNC: 3.9 MMOL/L (ref 3.5–5.1)
PROT SERPL-MCNC: 6.6 G/DL (ref 6–8.4)
RBC # BLD AUTO: 2.84 M/UL (ref 4–5.4)
SODIUM SERPL-SCNC: 138 MMOL/L (ref 136–145)
T4 FREE SERPL-MCNC: 0.93 NG/DL (ref 0.71–1.51)
TSH SERPL DL<=0.005 MIU/L-ACNC: 20.16 UIU/ML (ref 0.4–4)
WBC # BLD AUTO: 3.6 K/UL (ref 3.9–12.7)

## 2023-04-11 PROCEDURE — 36415 COLL VENOUS BLD VENIPUNCTURE: CPT | Performed by: INTERNAL MEDICINE

## 2023-04-11 PROCEDURE — 80053 COMPREHEN METABOLIC PANEL: CPT | Performed by: INTERNAL MEDICINE

## 2023-04-11 PROCEDURE — 84443 ASSAY THYROID STIM HORMONE: CPT | Performed by: INTERNAL MEDICINE

## 2023-04-11 PROCEDURE — 84439 ASSAY OF FREE THYROXINE: CPT | Performed by: INTERNAL MEDICINE

## 2023-04-11 PROCEDURE — 85007 BL SMEAR W/DIFF WBC COUNT: CPT | Performed by: INTERNAL MEDICINE

## 2023-04-11 PROCEDURE — 85027 COMPLETE CBC AUTOMATED: CPT | Performed by: INTERNAL MEDICINE

## 2023-04-13 ENCOUNTER — SPECIALTY PHARMACY (OUTPATIENT)
Dept: PHARMACY | Facility: CLINIC | Age: 53
End: 2023-04-13
Payer: MEDICAID

## 2023-04-13 ENCOUNTER — OFFICE VISIT (OUTPATIENT)
Dept: HEMATOLOGY/ONCOLOGY | Facility: CLINIC | Age: 53
End: 2023-04-13
Payer: MEDICAID

## 2023-04-13 ENCOUNTER — INFUSION (OUTPATIENT)
Dept: INFUSION THERAPY | Facility: HOSPITAL | Age: 53
End: 2023-04-13
Attending: INTERNAL MEDICINE
Payer: MEDICAID

## 2023-04-13 VITALS
TEMPERATURE: 98 F | BODY MASS INDEX: 34.67 KG/M2 | WEIGHT: 220.88 LBS | DIASTOLIC BLOOD PRESSURE: 78 MMHG | HEIGHT: 67 IN | SYSTOLIC BLOOD PRESSURE: 110 MMHG | OXYGEN SATURATION: 96 % | RESPIRATION RATE: 18 BRPM | HEART RATE: 84 BPM

## 2023-04-13 VITALS
HEART RATE: 90 BPM | TEMPERATURE: 98 F | SYSTOLIC BLOOD PRESSURE: 130 MMHG | OXYGEN SATURATION: 97 % | RESPIRATION RATE: 18 BRPM | DIASTOLIC BLOOD PRESSURE: 93 MMHG

## 2023-04-13 DIAGNOSIS — C53.9 RECURRENT CERVICAL CANCER: ICD-10-CM

## 2023-04-13 DIAGNOSIS — C50.811 MALIGNANT NEOPLASM OF OVERLAPPING SITES OF RIGHT BREAST IN FEMALE, ESTROGEN RECEPTOR POSITIVE: ICD-10-CM

## 2023-04-13 DIAGNOSIS — R19.7 DIARRHEA, UNSPECIFIED TYPE: ICD-10-CM

## 2023-04-13 DIAGNOSIS — E03.9 HYPOTHYROIDISM, UNSPECIFIED TYPE: ICD-10-CM

## 2023-04-13 DIAGNOSIS — H92.02 LEFT EAR PAIN: Primary | ICD-10-CM

## 2023-04-13 DIAGNOSIS — C50.919 PRIMARY MALIGNANT NEOPLASM OF BREAST WITH METASTASIS TO OTHER SITE, UNSPECIFIED LATERALITY: Primary | ICD-10-CM

## 2023-04-13 DIAGNOSIS — C77.3 MALIGNANT NEOPLASM METASTATIC TO LYMPH NODE OF AXILLA: ICD-10-CM

## 2023-04-13 DIAGNOSIS — Z17.0 MALIGNANT NEOPLASM OF OVERLAPPING SITES OF RIGHT BREAST IN FEMALE, ESTROGEN RECEPTOR POSITIVE: ICD-10-CM

## 2023-04-13 PROCEDURE — 96523 IRRIG DRUG DELIVERY DEVICE: CPT | Mod: 59

## 2023-04-13 PROCEDURE — 3078F PR MOST RECENT DIASTOLIC BLOOD PRESSURE < 80 MM HG: ICD-10-PCS | Mod: CPTII,,, | Performed by: INTERNAL MEDICINE

## 2023-04-13 PROCEDURE — 99215 OFFICE O/P EST HI 40 MIN: CPT | Mod: S$PBB,,, | Performed by: INTERNAL MEDICINE

## 2023-04-13 PROCEDURE — 25000003 PHARM REV CODE 250: Performed by: INTERNAL MEDICINE

## 2023-04-13 PROCEDURE — 3008F BODY MASS INDEX DOCD: CPT | Mod: CPTII,,, | Performed by: INTERNAL MEDICINE

## 2023-04-13 PROCEDURE — 63600175 PHARM REV CODE 636 W HCPCS: Performed by: INTERNAL MEDICINE

## 2023-04-13 PROCEDURE — 3078F DIAST BP <80 MM HG: CPT | Mod: CPTII,,, | Performed by: INTERNAL MEDICINE

## 2023-04-13 PROCEDURE — 99999 PR PBB SHADOW E&M-EST. PATIENT-LVL IV: ICD-10-PCS | Mod: PBBFAC,,, | Performed by: INTERNAL MEDICINE

## 2023-04-13 PROCEDURE — 3074F SYST BP LT 130 MM HG: CPT | Mod: CPTII,,, | Performed by: INTERNAL MEDICINE

## 2023-04-13 PROCEDURE — 99999 PR PBB SHADOW E&M-EST. PATIENT-LVL IV: CPT | Mod: PBBFAC,,, | Performed by: INTERNAL MEDICINE

## 2023-04-13 PROCEDURE — 99215 PR OFFICE/OUTPT VISIT, EST, LEVL V, 40-54 MIN: ICD-10-PCS | Mod: S$PBB,,, | Performed by: INTERNAL MEDICINE

## 2023-04-13 PROCEDURE — A4216 STERILE WATER/SALINE, 10 ML: HCPCS | Performed by: INTERNAL MEDICINE

## 2023-04-13 PROCEDURE — 99214 OFFICE O/P EST MOD 30 MIN: CPT | Mod: PBBFAC | Performed by: INTERNAL MEDICINE

## 2023-04-13 PROCEDURE — 3008F PR BODY MASS INDEX (BMI) DOCUMENTED: ICD-10-PCS | Mod: CPTII,,, | Performed by: INTERNAL MEDICINE

## 2023-04-13 PROCEDURE — 3074F PR MOST RECENT SYSTOLIC BLOOD PRESSURE < 130 MM HG: ICD-10-PCS | Mod: CPTII,,, | Performed by: INTERNAL MEDICINE

## 2023-04-13 RX ORDER — DIPHENOXYLATE HYDROCHLORIDE AND ATROPINE SULFATE 2.5; .025 MG/1; MG/1
1 TABLET ORAL 4 TIMES DAILY PRN
Qty: 30 TABLET | Refills: 1 | Status: SHIPPED | OUTPATIENT
Start: 2023-04-13 | End: 2024-04-12

## 2023-04-13 RX ORDER — HEPARIN 100 UNIT/ML
500 SYRINGE INTRAVENOUS
Status: DISCONTINUED | OUTPATIENT
Start: 2023-04-13 | End: 2023-04-13 | Stop reason: HOSPADM

## 2023-04-13 RX ORDER — HEPARIN 100 UNIT/ML
500 SYRINGE INTRAVENOUS
Status: CANCELLED | OUTPATIENT
Start: 2023-04-13

## 2023-04-13 RX ORDER — SODIUM CHLORIDE 0.9 % (FLUSH) 0.9 %
10 SYRINGE (ML) INJECTION
Status: DISCONTINUED | OUTPATIENT
Start: 2023-04-13 | End: 2023-04-13 | Stop reason: HOSPADM

## 2023-04-13 RX ORDER — SODIUM CHLORIDE 0.9 % (FLUSH) 0.9 %
10 SYRINGE (ML) INJECTION
Status: CANCELLED | OUTPATIENT
Start: 2023-04-13

## 2023-04-13 RX ADMIN — HEPARIN 500 UNITS: 100 SYRINGE at 03:04

## 2023-04-13 RX ADMIN — SODIUM CHLORIDE, PRESERVATIVE FREE 10 ML: 5 INJECTION INTRAVENOUS at 03:04

## 2023-04-13 NOTE — PROGRESS NOTES
Subjective:      DATE OF VISIT: 4/13/2023   ?   Patient ID:?Josey Flores is a 52 y.o. female.?? MR#: [id number]   ?   PRIMARY PROVIDER: Dr. Asencio  ?   CHIEF COMPLAINT:  Follow-up  ?   ONCOLOGIC DIAGNOSIS:      Stage IV cervical squamous cell carcinoma    stage IV, T2 N1b M1, invasive breast carcinoma, ER/MD+, HER2-    Papillary thyroid carcinoma status post total thyroidectomy on 08/31/2017, mpT1b N0     Cervical HSIL MIREILLE 3 s/p LEEP, 2017  ?   CURRENT TREATMENT:    Letrozole and palbociclib    PAST TREATMENT:    Palliative chemotherapy: cisplatin, paclitaxel and bevacizumab; 02/22/2021 cycle 1 day 1 -> bevacizumab maintenance after 6 cycles  Carboplatin, paclitaxel and pembrolizumab, C1D1 1/28/22; maintenance pembrolizumab, d/c 9/2022    INTERVAL EVENTS  Recent ENT follow-up for osteonecrosis some worsening recently right-sided ear, patient elected for further debridement/drops.  She has had recently new rib pain/spasm.  Occasional lower pelvic cramping associated with spicy food only which she tries to avoid.  She has had some weight gain.  No evidence of vaginal or other bleeding or other areas of pain/fracture.  She continues on palbociclib and letrozole.      ROS  A comprehensive 14-point review of systems was reviewed with patient and was negative other than as specified above.   ?     Objective:      Physical Exam        Vitals:    04/13/23 1450   BP: 110/78   Pulse: 84   Resp: 18   Temp: 97.8 °F (36.6 °C)      ECOG:?0   General appearance: Generally well appearing, in no acute distress.   Head, eyes, ears, nose, and throat: moist mucous membranes.  Pain bilateral ear  Respiratory:  Normal work of breathing  Abdomen: nontender, nondistended.   Extremities: Warm, without edema.   Neurologic: Alert and oriented. Grossly normal strength, coordination, and gait.   Skin: No rashes, ecchymoses or petechial lesion.   Psychiatric:  Normal mood and affect.      Laboratory:  ?   No visits with results within 1  Day(s) from this visit.   Latest known visit with results is:   Lab Visit on 04/11/2023   Component Date Value Ref Range Status    WBC 04/11/2023 3.60 (L)  3.90 - 12.70 K/uL Final    RBC 04/11/2023 2.84 (L)  4.00 - 5.40 M/uL Final    Hemoglobin 04/11/2023 11.0 (L)  12.0 - 16.0 g/dL Final    Hematocrit 04/11/2023 31.8 (L)  37.0 - 48.5 % Final    MCV 04/11/2023 112 (H)  82 - 98 fL Final    MCH 04/11/2023 38.7 (H)  27.0 - 31.0 pg Final    MCHC 04/11/2023 34.6  32.0 - 36.0 g/dL Final    RDW 04/11/2023 15.4 (H)  11.5 - 14.5 % Final    Platelets 04/11/2023 191  150 - 450 K/uL Final    MPV 04/11/2023 11.1  9.2 - 12.9 fL Final    Immature Granulocytes 04/11/2023 CANCELED  0.0 - 0.5 % Final-Edited    Immature Grans (Abs) 04/11/2023 CANCELED  0.00 - 0.04 K/uL Final-Edited    nRBC 04/11/2023 0  0 /100 WBC Final    Gran % 04/11/2023 52.0  38.0 - 73.0 % Final    Lymph % 04/11/2023 44.0  18.0 - 48.0 % Final    Mono % 04/11/2023 3.0 (L)  4.0 - 15.0 % Final    Eosinophil % 04/11/2023 1.0  0.0 - 8.0 % Final    Basophil % 04/11/2023 0.0  0.0 - 1.9 % Final    Platelet Estimate 04/11/2023 Appears normal   Final    Aniso 04/11/2023 Slight   Final    Poik 04/11/2023 Slight   Final    Poly 04/11/2023 Occasional   Final    Ovalocytes 04/11/2023 Occasional   Final    Differential Method 04/11/2023 Manual   Corrected    Sodium 04/11/2023 138  136 - 145 mmol/L Final    Potassium 04/11/2023 3.9  3.5 - 5.1 mmol/L Final    Chloride 04/11/2023 103  95 - 110 mmol/L Final    CO2 04/11/2023 25  23 - 29 mmol/L Final    Glucose 04/11/2023 127 (H)  70 - 110 mg/dL Final    BUN 04/11/2023 22 (H)  6 - 20 mg/dL Final    Creatinine 04/11/2023 1.5 (H)  0.5 - 1.4 mg/dL Final    Calcium 04/11/2023 9.1  8.7 - 10.5 mg/dL Final    Total Protein 04/11/2023 6.6  6.0 - 8.4 g/dL Final    Albumin 04/11/2023 3.9  3.5 - 5.2 g/dL Final    Total Bilirubin 04/11/2023 0.4  0.1 - 1.0 mg/dL Final    Alkaline Phosphatase 04/11/2023 66  55 -  135 U/L Final    AST 04/11/2023 12  10 - 40 U/L Final    ALT 04/11/2023 19  10 - 44 U/L Final    Anion Gap 04/11/2023 10  8 - 16 mmol/L Final    eGFR 04/11/2023 41 (A)  >60 mL/min/1.73 m^2 Final    Free T4 04/11/2023 0.93  0.71 - 1.51 ng/dL Final    TSH 04/11/2023 20.163 (H)  0.400 - 4.000 uIU/mL Final      Lab Results   Component Value Date    WBC 3.60 (L) 04/11/2023    HGB 11.0 (L) 04/11/2023    HCT 31.8 (L) 04/11/2023     (H) 04/11/2023     04/11/2023         Chemistry        Component Value Date/Time     04/11/2023 1012    K 3.9 04/11/2023 1012     04/11/2023 1012    CO2 25 04/11/2023 1012    BUN 22 (H) 04/11/2023 1012    CREATININE 1.5 (H) 04/11/2023 1012     (H) 04/11/2023 1012        Component Value Date/Time    CALCIUM 9.1 04/11/2023 1012    ALKPHOS 66 04/11/2023 1012    AST 12 04/11/2023 1012    ALT 19 04/11/2023 1012    BILITOT 0.4 04/11/2023 1012    ESTGFRAFRICA 60 07/23/2022 1922    EGFRNONAA 52 (A) 07/23/2022 1922          ? IMAGING   No results found. However, due to the size of the patient record, not all encounters were searched. Please check Results Review for a complete set of results.      No results found. However, due to the size of the patient record, not all encounters were searched. Please check Results Review for a complete set of results.    PATHOLOGY  2/2021  Station 7 lymph node, fine needle aspiration (8 smears, 1 cell block):       -  Positive for malignant cells, morphologically consistent with   metastatic squamous cell carcinoma     Assessment/Plan:       1. Left ear pain    2. Diarrhea, unspecified type    3. Malignant neoplasm metastatic to lymph node of axilla    4. Hypothyroidism, unspecified type    5. Recurrent cervical cancer              Plan:     # metastatic squamous cell carcinoma of the cervix SCC cervix:  history of HSIL Status post LEEP 2017. In December 2020 follow-up with gynecology with new spotty vaginal bleeding/discharge with  biopsy 12/7/20 showing squamous cell carcinoma consistent with cervical primary.  MRI pelvis performed showing locally advanced disease with 4.9 cm cervical mass with right parametrial involvement and enlarged right external iliac lymph node 1.5 x 1.2 cm. PET-CT showing avidity of cervical mass with extension to lower uterine segment, right vaginal focus of avidity and lymphadenopathy including right internal external iliac, periaortic, pericaval.  There is the new hypermetabolic lymphadenopathy in right subcarinal 2.3 cm SUV 5.8. Small 9 mm right paratracheal S base slightly increased no FDG avid SUV 2.4.  01/29/2021 multidisciplinary tumor board discussion of her case with review of imaging; concern for new avid lymphadenopathy particularly in right subcarinal may be most consistent with metastatic cervical squamous cell carcinoma; tumor board recommendation for biopsy to confirm for prognostic information as well as given other concomitant malignancy to exclude alternative histology, although felt less likely metastatic breast given this is been well controlled and primarily bony disease involvement.  We discussed recommendation for systemic chemotherapy given advanced cervical squamous cell carcinoma with cisplatin, paclitaxel and bevacizumab with discontinuation of palbociclib but can continue aromatase inhibitor; taxane may also be active for her concomitant metastatic breast cancer.   - EBUS with biopsy 2/18/21, station 7 consistent with metastatic squamous cell carcinoma of cervical origin.  - STRATA requested on cervical SCC, ERBB3 <5% felt to be subclonal per report, PIK3CA amplification, GU6975, KDM6A, FRANK, TMB low, PDL1 high may indicated sensitivity to check point inhibitor in future regimen.  - audiology exam 2/18/21, recommended that she let us know if any perceived change in her hearing throughout treatment and recommend repeat testing throughout to ensure no ototoxicity.  - RUST germline genetic  testing returned negative for mutation, provided to patient.  - cycle 1 day 1 cisplatin paclitaxel and bevacizumab on 02/22/2021  Repeat scans 04/26/2021 showed excellent improvement in thoracic and pelvic lymphadenopathy and primary cervical mass.  She is asymptomatic from this no further bleeding.  Recommended continuation of her current regimen which she is tolerating well with supportive care, IV fluids 3 times weekly per patient preference.  - restaging after cycle 6 she has continued improvement in metastatic cervical squamous cell carcinoma; avidity in left vulvar area diminished associated with known infection.  She has required substantial supportive care on chemotherapy and recommend given sustained improvement on scans continuing and absence of chemotherapy with bevacizumab alone and continue close surveillance.  She would like to continue IV fluids weekly as needed.    We reviewed in detail restaging PET-CT January 2022 notable for uptake in cervical region concerning for oligo progression/recurrence of her disease.  Previously biopsy proven metastatic cervical cancer in lungs without evidence of recurrent mass/lymphadenopathy/avidity in this region or otherwise.    Given prior PDL1 high disease CPS >1 we decided to proceed with chemoimmunotherapy per Keynote 826, carboplatin, paclitaxel and pembrolizumab.   She completed 6 cycles of carboplatin paclitaxel with pembrolizumab (1 cycle without pembrolizumab from unclear reasons with my colleague) with interval brachytherapy with Dr. V[facility]quez at [facility].    Restaging PET scan with diminished avidity vaginal cuff status post brachytherapy.  No new areas concerning for active disease.  We discussed consideration of holding further maintenance immunotherapy at this time given no evidence of recurrent / progressive metastatic disease and is amenable to this.  Will continue her breast cancer treatment per below.    Restaging scan  1/11/2022  without evidence of recurrent metastatic disease active.  She is had new rib pain recommend PET scan restaging due to history of metastatic breast and cervical cancers to determine status of diseases.      # Hypothyroidism:  T4 normal range. Increased dose of levothyroxine currently on to 75 mcg daily.        Macrocytosis: Possibly related to thyroid disorder will continue to monitor.  She is on vitamin-B supplement.  No new anemia.    # neuropathy:  Mild, will monitor with re-initiation of chemotherapy per above.    # metastatic breast cancer ER positive HER2 negative:  Had been on systemic therapy with palbociclib and letrozole.  Tolerating well.  Had been on hold with ENT issues okay to restart at this time.  She understands if concern for active infection to notify us as may need to be held.    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    # history of papillary thyroid carcinoma status post total thyroidectomy on 08/31/2017: s/p total thyroidectomy on levothyroxine.     # tobacco abuse:  Current tobacco use.   I have counseled for at least 3 min on the importance of tobacco cessation and discussed pharmacologic and therapy options to  aid in cessation.  She is interested in trial of bupropion prescribed to her local pharmacy.    Follow-Up:       Route Chart for Scheduling    Med Onc Chart Routing      Follow up with physician . After PET   Follow up with [facility]    Infusion scheduling note    Injection scheduling note    Labs    Imaging PET scan   next available   Pharmacy appointment    Other referrals           Therapy Plan Information  INF FLUIDS  IV Fluids  sodium chloride 0.9% bolus 1,000 mL  1,000 mL, Intravenous, PRN  Flushes  sodium chloride 0.9% flush 10 mL  10 mL, Intravenous, PRN  heparin, porcine (PF) 100 unit/mL injection flush 500 Units  500 Units, Intravenous, PRN    PORT FLUSH  Flushes  heparin, porcine (PF) 100 unit/mL injection  flush 500 Units  500 Units, Intravenous, Every visit  sodium chloride 0.9% flush 10 mL  10 mL, Intravenous, Every visit

## 2023-04-13 NOTE — TELEPHONE ENCOUNTER
Spoke to patient, who says she still has 2 to 3 days of med left, and then she starts her 1 week rest period. Informed patient that we will give her a follow up call early next week. Patient voiced understanding

## 2023-04-13 NOTE — DISCHARGE INSTRUCTIONS
.[facility]  [address] [facility]  [address]  [phone] phone     [phone] fax  Hours of Operation: Monday- Friday 8:00am- 5:00pm  After hours phone  [phone]  Hematology / Oncology Physicians on call    Dr. Jake Carter      Nurse Practitioners:    Virginie Garrison, VINNY Sy, VINNY Dee, VINNY Prieto, VINNY Olivas, RYAN Gutiérrez      Please don't hesitate to call if you have any concerns.

## 2023-04-13 NOTE — NURSING
"Pt here for Mediport Flush. Right chestwall mediport accessed with a 20g 1" tomlin via sterile technique.  Excellent blood return noted.  Flushed with 10ml NS and 5 ml heparin solution.  Needle D/C, site without redness, swelling, or drainage noted.  Dressing applied.  Patient tolerated well.  Patient to return to clinic in 05/25/2023   "

## 2023-04-18 ENCOUNTER — OFFICE VISIT (OUTPATIENT)
Dept: PALLIATIVE MEDICINE | Facility: CLINIC | Age: 53
End: 2023-04-18
Payer: MEDICAID

## 2023-04-18 ENCOUNTER — PATIENT MESSAGE (OUTPATIENT)
Dept: PHARMACY | Facility: CLINIC | Age: 53
End: 2023-04-18
Payer: MEDICAID

## 2023-04-18 DIAGNOSIS — C55 MALIGNANT NEOPLASM OF UTERUS, UNSPECIFIED SITE: ICD-10-CM

## 2023-04-18 DIAGNOSIS — F41.9 ANXIETY AND DEPRESSION: ICD-10-CM

## 2023-04-18 DIAGNOSIS — C77.3 MALIGNANT NEOPLASM METASTATIC TO LYMPH NODE OF AXILLA: Primary | ICD-10-CM

## 2023-04-18 DIAGNOSIS — F32.A ANXIETY AND DEPRESSION: ICD-10-CM

## 2023-04-18 DIAGNOSIS — Z51.5 PALLIATIVE CARE ENCOUNTER: ICD-10-CM

## 2023-04-18 DIAGNOSIS — G89.3 NEOPLASM RELATED PAIN: ICD-10-CM

## 2023-04-18 DIAGNOSIS — Z91.89 AT RISK FOR LONG QT SYNDROME: ICD-10-CM

## 2023-04-18 DIAGNOSIS — C79.81 METASTATIC MALIGNANT NEOPLASM TO BREAST: Primary | ICD-10-CM

## 2023-04-18 PROCEDURE — 99999 PR PBB SHADOW E&M-EST. PATIENT-LVL II: CPT | Mod: PBBFAC,,,

## 2023-04-18 PROCEDURE — 99999 PR PBB SHADOW E&M-EST. PATIENT-LVL II: ICD-10-PCS | Mod: PBBFAC,,,

## 2023-04-18 PROCEDURE — 99215 OFFICE O/P EST HI 40 MIN: CPT | Mod: S$PBB,,,

## 2023-04-18 PROCEDURE — 99212 OFFICE O/P EST SF 10 MIN: CPT | Mod: PBBFAC

## 2023-04-18 PROCEDURE — 99215 PR OFFICE/OUTPT VISIT, EST, LEVL V, 40-54 MIN: ICD-10-PCS | Mod: S$PBB,,,

## 2023-04-18 NOTE — PROGRESS NOTES
Palliative Medicine         Follow up    SUBJECTIVE:     Josey Flores is a 52 y.o. female with history of metastatic breast cancer, thyroid cancer post thyroidectomy (2017), and cervical cancer (2017). She is currently completed chemotherapy. PET scan in January 2023 showed no evidence of recurrence or metastatic disease. She is followed by Dr. Ba.     Chief complaint: New pain -follow up    4/18/2023: Pt attended clinic with her partner, Alessio. She reported new bilateral under the rib and lower back pain that has started and worsened over the last two months. She stated she discussed this with Dr. Ba during her appointment on April 13, and a PET scan was ordered to assess for recurrent or metastatic disease. She denied, fever, chills, sweats, or dyspnea. She stated she has been taking Norco 10 four times a day, and MS ER 15mg Daily at night. She states she does not take MS ER in the morning due to fear of sleeping during the day. Pt also reported diarrhea, which has improved with Lomotil, in addition to increased anxiety and depression due to worsening pain.   I encouraged her to take Norco 10mg QID PRN for pain, and MS ER 15mg BID if her pain continues to worsen, and she is fine with mild sedation. Pt stated she is only comfortable with taking MSER at night. I noted she last filled her Norco   Pt stated she is willing to try an antidepressant for her mood, and she had stopped taking Wellbutrin about a month ago due to palpitations. I informed her that we will complete an EKG to assess her Qtc interval prior to starting an antidepressant. Her last EKG was in May 2021 showing Qtc of 435. Pt agreed to complete EKG.     Past visits:    2/14/2023: Patient comes to clinic with her significant other Alessio. We discussed her recent visits with Dr. Asencio and Deo. She is in good spirits as her Restaging PET scan 1/11/2023 without evidence of recurrent metastatic disease active and no new areas concerning for  active disease. She noted she was instructed to continue current treatment for breast cancer and surveillance for cervical cancer. She denies any issues and notes that she continues to be active and her and significant other are going out to eat after visit. She notes that she has intermittent pelvic pain in which she attributes to radiation but notes that current pain medication effective in managing. She notes that it does not happen often. In the setting of stable scans and continued improvement clinically, we dicussed continuing opoid wean. Patient currently taking MSER 15mg po BID and taking 3-4 doses of Norco daily. Will decrease MSER to 15mg po Daily and continue current hydrocodone regimen not to exceed 4 doses in a day. Will follow up in 4 weeks to continue opoid wean. No new scripts needed as MSER and hydrocodone were just filled on 2/8/23.     12/1/22: Patient comes to clinic with her significant other Alessio. She is still feeling sad because of her grand daughter since she moved out. She noted that she stopped taking her Wellbutrin but wants to start back taking it to help with her mood also to help her with the process of quitting smoking. Also she notes she has been dealing with worsening pain to her left ear. She saw Otolaryngologist recently and he noted no signs of changes on CT and offered patient surgery or non surgical management. Patient notes that she did not want surgery at that time because her doctor made her aware that the procedure may help but there is a possibility that it may not. She notes that the pain and drainage has worsened and she is now thinking about proceeding with surgery even if there is a risk that it will not work. She has been in touch with her Otolaryngologist's office to schedule appointment. She last saw Dr. Asencio 10/13/22 in which there were no changes and she was to continue on current treatment withLetrozole and palbociclib. Her visit with Gyn/onc also noted no new  changes and everything was stable. During exam patient reports pelvic pain but notes that medication has been effective in managing it. Being that there were no changes with all of her MD visits, I spoke with patient about continuing plan from her previous PM visit with weaning MSER. She is in agreement, we will wean down to MSER 15mg po BID. I did make patient aware that medications provided by me were not for her ear pain or or acute issues she may experience but it is indicated for malignant pain. I reinforced education about taking medications as prescribed and for what it is indicated for. I made her aware that we could not send in another early prescription as my counterpart had to do in November. On 11/1/22,  patient sent a message noting she ran out of her medication before scheduled visit. She reported that she was admitted to the [facility] due to abdominal pain and then when she was discharged she cut her foot very badly and had to receive stiches. Due to these issues, she used her medications more frequently than she was prescribed. She was taking 2 tabs of MSER 30mg BID instead of 1 BID. She was taking hydrocodone 4-5x/ day. She admitted that she was not supposed to do this and will not do this again. She was hoping that provider would write the 15mg ER tabs to hold her over. She explained why she would not write for the 15mg and typically do not write early refills. This is the second time that she has taken ER medications more frequently than prescribed. She was counseled gain on the risks with this behavior and this would be her last warning as well as last early refill. Moving forward if she takes opioids other than rx this is grounds for dismissal from our clinic given issues with impulse control and safety. She expressed understanding and said that her  has also scolded her. She knows this was not a good choice and continues to apologize and promised she will not do it again. My  partner explained our boundaries in clinic are not punitive but rather in place for her safety. I was present with my counterpart for the conversation and agreed that early refills will be not be permitted moving forward    10/06/2022  She comes to clinic with DEYSI Mccallum today. She is down because her granddaughter is not in a good situation but otherwise feeling okay. Since we last saw each other her pain has improved which is likely related to the positive response to treatment. She would like to begin weaning the MSER back to 30mg BID and hopefully continue to decrease further. She is still taking hydrocodone 10mg QID with good response. Her mood improved with the resumption of Wellbutrin and is still cutting back on her tobacco use. She has been having ear pain which is being evaluated by ENT and is waiting to hear when her CT is scheduled. There seems to have been a charting error with her Letrozole resulting in d/c the order and unable to refill. I will reach out to oncology to reorder. We will follow up in 2 months and she will message me for refills whether she would like to continue weaning before then. We do not have HCPOA on file even though she remembers bringing to clinic. She will bring again next visit and confirms that her sister David is HCPOA.    09/01/22: Ms. Flores comes to clinic today with her fiance Alessio. She reports that her lower abdominal pain has worsened since brachytherapy started several weeks ago. She reports spontaneous lower abdominal pain not related to food but sometimes exacerbated by BM. She denies constipation. She has taken an extra dose of MSER at bedtime to help with sleep. We talked about the risks associated with this and would advise against doing this again. She is taking hydrocodone q4hr sometimes waking up at night and needing another dose averaging 4-6 in a day. I recommend we increase the long acting dose in hopes for better control and less PRN usage. She says her sleep  "is poor because this is when the pain intensifies (lying flat) but also anxiety and sadness since her granddaughter moved out of the home. She is taking Xanax HS which helps. She was taking Wellbutrin in the last but not sure why she stopped. For now lets not resume this and evaluate at her next visit.    06/16/2022: Patient doing okay.  No new issues since her last visit.  She continue to have some lower GI pains describes now as "cramping after a BM".  Previously, her discomfort was after eating.  She has been referred to GI.  She has some fatigue but managing.  Pain is controlled on current regimen.  She has upcoming scans and appointment with Dr. Asencio.  She has some anxiety and life stressors affecting her situation.  Her son and his ex-girfriend (now in [place]) can no longer care for their 4 year old daughter.  She and her partner are assuming the parental rights of her granddaughter.  They seem to be coping as well as can be expected.  Overall, she is doing okay.  We will continue to follow at quarterly intervals.  She knows to reach out if her situation changes or symptom burden worsens.    03/10/2022: Overall, patient seems to be doing okay.  She has some new GI issues with abdominal pain after eating and intermittent diarrhea not felt to be related to her disease or treatment.  Oncology discussed GI referral and she is considering this if no improvement.  We are adjusting her pain medication regimen today mostly to cut back on her prn Norco which she continues to use consistently at 5-6 tablets/day.  She knows she can reach out for additional questions or concerns.     01/06/2022: Patient is a 51 y.o. year old female presenting with for palliative follow up for her metastatic breast cancer, SCC of the cervix. Treatments currently on hold due to recurrent ear infection. She is anxious about results of her recent scan and fearful of disease progression while off treatment.  Otherwise, her pain continues to be " controlled on her current regimen.  No new symptoms or complaints today.    ONCOLOGY DIAGNOSES:  Stage IV cervical squamous cell carcinoma   stage IV, T2 N1b M1, invasive breast carcinoma, ER/NM+, HER2-   Papillary thyroid carcinoma status post total thyroidectomy on 08/31/2017, mpT1b N0    Cervical HSIL MIREILLE 3 s/p LEEP, 2017    Patient looks and feels better compared to last visit.  She walked into her appt.  Ear is better and she is being followed by ENT.  She has resumed treatment for both her cervical and breast cancer and is followed closely by medical oncology and gyn oncology.  Pain continues to be controlled on current medication regimen.  She is having knee issues with swelling and likely OA.  We discussed referral to orthopedics.  She also plans to follow up with neurosurgery.  Overall, no major changes and no new issues.     LA TERESSA reviewed and summarized:      OBJECTIVE:     ROS:  Review of Systems   Constitutional:  Positive for activity change and fatigue. Negative for appetite change, chills and fever.   HENT:  Negative for mouth sores, sore throat and trouble swallowing.    Respiratory:  Negative for cough, chest tightness and shortness of breath.    Gastrointestinal:  Positive for nausea. Negative for abdominal distention, abdominal pain, constipation, diarrhea and vomiting.   Genitourinary:  Negative for difficulty urinating and dysuria.   Musculoskeletal:  Negative for back pain and myalgias.   Skin:  Negative for rash and wound.   Allergic/Immunologic: Negative for immunocompromised state.   Neurological:  Negative for weakness and headaches.   Psychiatric/Behavioral:  Positive for agitation, dysphoric mood and sleep disturbance. Negative for confusion and decreased concentration. The patient is nervous/anxious.      Review of Symptoms      Symptom Assessment (ESAS 0-10 Scale)  Pain:  7  Dyspnea:  0  Anxiety:  8  Nausea:  5  Depression:  8  Anorexia:  0  Fatigue:  5  Insomnia:  0  Restlessness:   0  Agitation:  9     CAM / Delirium:  Negative  Constipation:  Negative  Diarrhea:  Positive (Pt uses Lomotil PRN.Improved)    Anxiety:  Is nervous/anxious  Constipation:  No constipation    Bowel Management Plan (BMP):  Yes      Pain Assessment:    Location(s): chest and back (New substernal/rib pain)    Back       Location: lower        Quality: Aching        Quantity: 7/10 in intensity        Chronicity: Onset 2 month(s) ago, gradually worsening        Aggravating Factors: Activity        Alleviating Factors: Opiates       Associated Symptoms: Myalgias  Chest       Location: bilateral        Quality: Sharp        Quantity: 7/10 in intensity        Chronicity: Onset 2 month(s) ago, gradually worsening since Pt reported bilateral under the rib and lower back pain, started two months ago and worsening. She stated Dr. Ba is aware, and has ordered a PET Scan on May 3, 2023 to assess for recurrent or metastatic disease.        Aggravating Factors: Activity        Alleviating Factors: Opiates        Associated Symptoms: Myalgias    Modified Osmar Scale:  0    ECOG Performance Status rdGrdrrdarddrderd:rd rd3rd Living Arrangements:  Lives with spouse    Psychosocial/Cultural:   See Palliative Psychosocial Note: No  Lives with her significant other of 16 years: they have 3 children combined, 2 sons from previous marriage. Granddaughter recently moved out of the home  **Primary  to Follow**  Palliative Care  Consult: No    Advance Care Planning   Advance Directives:   Living Will: Yes        Copy on chart: Yes    LaPOST: Yes    Do Not Resuscitate Status: Yes    Agent's Name:  sister Soriano, [phone]    Decision Making:  Patient answered questions  Goals of Care: What is most important right now is to focus on remaining as independent as possible, symptom/pain control, curative/life-prolongation (regardless of treatment burdens). Accordingly, we have decided that the best plan to meet the patient's  goals includes continuing with treatment.      Physical Exam:  Vitals:    Physical Exam  Vitals and nursing note reviewed.   Constitutional:       Appearance: Normal appearance. She is obese. She is ill-appearing (chronic).   HENT:      Head: Normocephalic and atraumatic.   Eyes:      General: No scleral icterus.     Conjunctiva/sclera: Conjunctivae normal.      Pupils: Pupils are equal, round, and reactive to light.   Cardiovascular:      Rate and Rhythm: Normal rate and regular rhythm.      Pulses: Normal pulses.      Heart sounds: Normal heart sounds. No murmur heard.  Pulmonary:      Effort: Pulmonary effort is normal. No respiratory distress.      Breath sounds: Normal breath sounds.   Chest:      Chest wall: No tenderness.   Abdominal:      General: Bowel sounds are normal. There is no distension.      Palpations: Abdomen is soft.      Tenderness: There is no abdominal tenderness.   Musculoskeletal:         General: Tenderness (Lower back) present. Normal range of motion.      Cervical back: Normal range of motion.      Right lower leg: No edema.      Left lower leg: No edema.   Skin:     General: Skin is warm and dry.      Capillary Refill: Capillary refill takes less than 2 seconds.      Coloration: Skin is not jaundiced.      Findings: No rash.   Neurological:      Mental Status: She is alert and oriented to person, place, and time. Mental status is at baseline.      Cranial Nerves: No cranial nerve deficit.   Psychiatric:         Mood and Affect: Mood normal.         Behavior: Behavior normal.         Thought Content: Thought content normal.         Judgment: Judgment normal.       Radiology and laboratory data reviewed    ASSESSMENT/PLAN:     Malignant neoplasm of overlapping sites of right breast in female, estrogen receptor positive  -Followed by Dr. Asencio  -Restaging scan  1/11/2022 without evidence of recurrent metastatic disease active.   -PET scan May 3, 2023 to re-assess etiology of pain.      Malignant neoplasm of endocervix  -Followed by  noted JUAN FRANCISCO, follow up in 3 months   -Restaging scan 1/11/2022 without evidence of recurrent metastatic disease active.  -Holding further maintenance immunotherapy at this time given no evidence of recurrent / progressive metastatic disease    -Surveillance for cervical cancer.    Neoplasm-related pain   -Continue MSER 15mg BID from daily, due to new pain. We will defer opioid weaning due to new rib pain  -Pending PET scan May 03, 2023 to assess for neoplasm recurrence  -Continue hydrocodone 10mg QID PRN not to exceed 4 doses in a day  -No scripts needed at visit as medication was filled on 4/10/2023  UDS repeat at next appointment- May 03, 2023  Narcan rx- Pt stated she knows how to use it      Anxiety/Depression  -Likely worsened by new pain and fear of cancer recurrence  -Continue Xanax 0.5mg-1mg BID PRN  -EKG to assess QTC prior to starting antidepressant    Palliative Care Encounter  -HCPOA:  David Acevedo, sister, [phone]  -Code Status: DNR; LaPOST completed and uploaded in EMR  -[facility]-Continue cancer treatment with surveillance and symptom management.      Follow up: 2 weeks to assess pain and mood    40 minutes of total time spent on the encounter, which includes face to face time and non-face to face time preparing to see the patient (eg, review of tests), Obtaining and/or reviewing separately obtained history, Documenting clinical information in the electronic or other health record, Independently interpreting results (not separately reported) and communicating results to the patient/family/caregiver, or Care coordination (not separately reported).    Signature: SUZIE GOTTI NP

## 2023-04-18 NOTE — PATIENT INSTRUCTIONS
Ok to take Norco 4 times a day (every 6 hours) as needed for pain, until we know why you're having new rib pain.  Take Morphine ER 15mg at night   -Daily Miralax and Senna for constipation.   -Do not take stool softener  -Continue Zofran for nausea  -Keep your Narcan in case you get too sleepy from [place]  -We will do an EKG today before starting an antidepressant.   We will see you after your PET Scan on May 3rd

## 2023-04-19 ENCOUNTER — HOSPITAL ENCOUNTER (OUTPATIENT)
Dept: CARDIOLOGY | Facility: HOSPITAL | Age: 53
Discharge: HOME | End: 2023-04-19
Payer: MEDICAID

## 2023-04-19 ENCOUNTER — PATIENT MESSAGE (OUTPATIENT)
Dept: ADMINISTRATIVE | Facility: HOSPITAL | Age: 53
End: 2023-04-19
Payer: MEDICAID

## 2023-04-19 DIAGNOSIS — Z91.89 AT RISK FOR LONG QT SYNDROME: ICD-10-CM

## 2023-04-19 PROCEDURE — 93010 EKG 12-LEAD: ICD-10-PCS | Mod: ,,, | Performed by: INTERNAL MEDICINE

## 2023-04-19 PROCEDURE — 93010 ELECTROCARDIOGRAM REPORT: CPT | Mod: ,,, | Performed by: INTERNAL MEDICINE

## 2023-04-19 PROCEDURE — 93005 ELECTROCARDIOGRAM TRACING: CPT

## 2023-04-24 NOTE — TELEPHONE ENCOUNTER
Specialty Pharmacy - Refill Coordination  Specialty Pharmacy - Clinical Reassessment    Specialty Medication Orders Linked to Encounter      Flowsheet Row Most Recent Value   Medication #1 palbociclib (IBRANCE) 125 mg Cap (Order#[id number], Rx#[id number])          Patient Diagnosis   C50.811, Z17.0 - Malignant neoplasm of overlapping sites of right breast in female, estrogen receptor positive    Josey Flores is a 52 y.o. female, who is followed by the specialty pharmacy service for management and education of her Ibrance.  She has been on therapy with Ibrance for 3 years.  I have reviewed her electronic medical record and current medication list and determined that specialty medication adjustment Is not needed at this time.    Patient has not experienced adverse events.  She Is adherent reporting 0 missed doses since last review.  Adherence has been encouraged with the following mechanism(s): proactive refill calls.  She is meeting goals of therapy and will continue treatment.        4/24/2023 3/15/2023 2/14/2023 1/17/2023 11/11/2022 10/5/2022 9/7/2022   Follow Up Review   # of missed doses 0 0 0 0  2 0   Reason      [place] ill or sick    New Medications? No No No No No No No   New Conditions? No No No No No No No   New Allergies? No No No No No No No   Med Effective? Good Good Good Good Good Good Good   Urgent Care? No No No No Yes No No   Requested Pharmacist? No No No No No No No            Therapy is appropriate to continue.    Therapy is effective: Yes  On scale of 1 to 10, how does patient rank quality of life? (10 - Best): 9  Recommendations: none at this time.  Review Method: Patient Contact    Tasks added this encounter   No tasks added.   Tasks due within next 3 months   4/29/2023 - Clinical Assessment (6 month recurrence)     Kyara Jimenez, PharmD  [facility]  [address]  [facility] [address]  Phone: [phone]  Fax: [phone]

## 2023-04-24 NOTE — TELEPHONE ENCOUNTER
Specialty Pharmacy - Refill Coordination  Specialty Pharmacy - Clinical Reassessment    Specialty Medication Orders Linked to Encounter      Flowsheet Row Most Recent Value   Medication #1 palbociclib (IBRANCE) 125 mg Cap (Order#[id number], Rx#[id number])            Refill Questions - Documented Responses      Flowsheet Row Most Recent Value   Patient Availability and HIPAA Verification    Does patient want to proceed with activity? Yes   HIPAA/medical authority confirmed? Yes   Relationship to patient of person spoken to? Self   Refill Screening Questions    Changes to allergies? No   Changes to medications? No   New conditions since last clinic visit? No   Unplanned office visit, urgent care, ED, or hospital admission in the last 4 weeks? No   How does patient/caregiver feel medication is working? Good   Financial problems or insurance changes? No   How many doses of your specialty medications were missed in the last 4 weeks? 0   Would patient like to speak to a pharmacist? No   When does the patient need to receive the medication? 04/25/23   Refill Delivery Questions    How will the patient receive the medication? MEDRx   When does the patient need to receive the medication? 04/25/23   Shipping Address Home   Address in [facility] confirmed and updated if neccessary? Yes   Expected Copay ($) 0   Is the patient able to afford the medication copay? Yes   Payment Method zero copay   Days supply of Refill 28   Supplies needed? No supplies needed   Refill activity completed? Yes   Refill activity plan Refill scheduled   Shipment/Pickup Date: 04/24/23            Current Outpatient Medications   Medication Sig    albuterol (PROVENTIL/VENTOLIN HFA) 90 mcg/actuation inhaler Inhale 2 puffs into the lungs every 4 (four) hours as needed for Wheezing or Shortness of Breath.    ALPRAZolam (XANAX) 1 MG tablet Take 0.5-1 tablets (0.5-1 mg total) by mouth 2 (two) times daily as needed for Anxiety.    buPROPion (WELLBUTRIN  SR) 150 MG TBSR 12 hr tablet Take 1 tablet (150 mg total) by mouth 2 (two) times daily. Take 1 tablet (150mg) once daily for 1 week then increase to twice daily    calcium carbonate 400 mg calcium (1,000 mg) Chew Take 2,000 mg by mouth once daily.    cyanocobalamin (VITAMIN B-12) 1000 MCG tablet Take 1 tablet (1,000 mcg total) by mouth once daily.    diphenoxylate-atropine 2.5-0.025 mg (LOMOTIL) 2.5-0.025 mg per tablet Take 1 tablet by mouth 4 (four) times daily as needed for Diarrhea.    ergocalciferol (VITAMIN D2) 50,000 unit Cap Take 5,000 Units by mouth once daily at 6am.     HYDROcodone-acetaminophen (NORCO)  mg per tablet Take 1 tablet by mouth 3 (three) times daily as needed for Pain. No more than 3 doses/ day    hydrOXYzine HCL (ATARAX) 25 MG tablet Take 1 tablet (25 mg total) by mouth 3 (three) times daily as needed for Itching.    ipratropium (ATROVENT) 42 mcg (0.06 %) nasal spray 2 sprays by Nasal route 4 (four) times daily. As needed for rhinitis. Take in evening before bed and in the morning    letrozole (FEMARA) 2.5 mg Tab Take 1 tablet (2.5 mg total) by mouth once daily.    levothyroxine (SYNTHROID) 200 MCG tablet Take 1 tablet (200 mcg total) by mouth once daily.    levothyroxine (SYNTHROID) 75 MCG tablet Take 1 tablet (75 mcg total) by mouth before breakfast.    morphine (MS CONTIN) 15 MG 12 hr tablet Take 1 tablet (15 mg total) by mouth 2 (two) times daily.    multivitamin (THERAGRAN) per tablet Take 1 tablet by mouth once daily.    naloxone (NARCAN) 4 mg/actuation Spry SMARTSIG:Both Nares    palbociclib (IBRANCE) 125 mg Cap Take one capsule (125 mg) by mouth once daily on days 1-21 of each 28-day cycle.    pentoxifylline (TRENTAL) 400 mg TbSR Take 1 tablet (400 mg total) by mouth 2 (two) times a day.    potassium chloride SA (K-DUR,KLOR-CON) 20 MEQ tablet Take 1 tablet (20 mEq total) by mouth once daily. Take daily for 3 days.    vitamin E 1000 UNIT capsule Take 1 capsule (1,000 Units total)  by mouth once daily.   Last reviewed on 4/13/2023  3:16 PM by Fred Mejia, RN    Review of patient's allergies indicates:   Allergen Reactions    Gabapentin Swelling     Note: unilateral joint edema, unclear if due to gabapentin    Nsaids (non-steroidal anti-inflammatory drug) Other (See Comments)     Instructed not to take NSAID drugs with chemo pill.    Clindamycin Rash    Vancomycin analogues Itching    Last reviewed on  4/13/2023 3:16 PM by Fred Mejia      Tasks added this encounter   No tasks added.   Tasks due within next 3 months   4/13/2023 - Refill Coordination Outreach (1 time occurrence)  4/29/2023 - Clinical Assessment (6 month recurrence)     Kkee Harry, PharmD  [facility]  [address] [place] [id number]  Phone: [phone]  Fax: [phone]

## 2023-04-26 ENCOUNTER — TELEPHONE (OUTPATIENT)
Dept: PALLIATIVE MEDICINE | Facility: CLINIC | Age: 53
End: 2023-04-26
Payer: MEDICAID

## 2023-04-26 NOTE — TELEPHONE ENCOUNTER
Palliative Nurse Note    Nurse reached out to pt to notify her of a palliative follow up apt on may 3 at 9:30 am, nurse wants pt to come in for this visit after her PET scan which is 12:45 same day same location.

## 2023-04-27 ENCOUNTER — OFFICE VISIT (OUTPATIENT)
Dept: OPHTHALMOLOGY | Facility: CLINIC | Age: 53
End: 2023-04-27
Payer: MEDICAID

## 2023-04-27 DIAGNOSIS — H52.7 REFRACTIVE ERROR: ICD-10-CM

## 2023-04-27 DIAGNOSIS — H34.8320 BRANCH RETINAL VEIN OCCLUSION OF LEFT EYE WITH MACULAR EDEMA: Primary | ICD-10-CM

## 2023-04-27 PROCEDURE — 92015 DETERMINE REFRACTIVE STATE: CPT | Mod: ,,, | Performed by: OPTOMETRIST

## 2023-04-27 PROCEDURE — 92004 PR EYE EXAM, NEW PATIENT,COMPREHESV: ICD-10-PCS | Mod: S$PBB,,, | Performed by: OPTOMETRIST

## 2023-04-27 PROCEDURE — 1159F MED LIST DOCD IN RCRD: CPT | Mod: CPTII,,, | Performed by: OPTOMETRIST

## 2023-04-27 PROCEDURE — 1159F PR MEDICATION LIST DOCUMENTED IN MEDICAL RECORD: ICD-10-PCS | Mod: CPTII,,, | Performed by: OPTOMETRIST

## 2023-04-27 PROCEDURE — 92015 PR REFRACTION: ICD-10-PCS | Mod: ,,, | Performed by: OPTOMETRIST

## 2023-04-27 PROCEDURE — 92004 COMPRE OPH EXAM NEW PT 1/>: CPT | Mod: S$PBB,,, | Performed by: OPTOMETRIST

## 2023-04-27 PROCEDURE — 99999 PR PBB SHADOW E&M-EST. PATIENT-LVL III: CPT | Mod: PBBFAC,,, | Performed by: OPTOMETRIST

## 2023-04-27 PROCEDURE — 99213 OFFICE O/P EST LOW 20 MIN: CPT | Mod: PBBFAC,PO | Performed by: OPTOMETRIST

## 2023-04-27 PROCEDURE — 99999 PR PBB SHADOW E&M-EST. PATIENT-LVL III: ICD-10-PCS | Mod: PBBFAC,,, | Performed by: OPTOMETRIST

## 2023-04-28 NOTE — PROGRESS NOTES
HPI    NP to DKT  Patient here today for yearly eye exam  Vision changes since last eye exam?: Yes at distance and near  No correction     Any eye pain today: No    Other ocular symptoms: No    Interested in contact lens fitting today? No              Last edited by Brittani Cantor, PCT on 4/27/2023  3:30 PM.            Assessment /Plan     For exam results, see Encounter Report.    Branch retinal vein occlusion of left eye with macular edema  - Diagnosed w/ Dr Chicas in 2018  - Recommend trial of Avastic, but lost to f/u.   - Re-establish with Dr Chicas next available.     Cataract both eyes  - Observe    RTC with Dr FERNANDO next available for retinal eval OS.

## 2023-05-03 ENCOUNTER — HOSPITAL ENCOUNTER (OUTPATIENT)
Dept: RADIOLOGY | Facility: HOSPITAL | Age: 53
Discharge: HOME | End: 2023-05-03
Attending: INTERNAL MEDICINE
Payer: MEDICAID

## 2023-05-03 ENCOUNTER — OFFICE VISIT (OUTPATIENT)
Dept: PALLIATIVE MEDICINE | Facility: CLINIC | Age: 53
End: 2023-05-03
Payer: MEDICAID

## 2023-05-03 ENCOUNTER — INFUSION (OUTPATIENT)
Dept: INFUSION THERAPY | Facility: HOSPITAL | Age: 53
End: 2023-05-03
Attending: INTERNAL MEDICINE
Payer: MEDICAID

## 2023-05-03 VITALS
DIASTOLIC BLOOD PRESSURE: 86 MMHG | HEART RATE: 88 BPM | HEIGHT: 67 IN | BODY MASS INDEX: 32.11 KG/M2 | WEIGHT: 204.56 LBS | SYSTOLIC BLOOD PRESSURE: 132 MMHG | TEMPERATURE: 97 F

## 2023-05-03 DIAGNOSIS — C79.81 METASTATIC MALIGNANT NEOPLASM TO BREAST: ICD-10-CM

## 2023-05-03 DIAGNOSIS — F41.9 ANXIETY AND DEPRESSION: Primary | ICD-10-CM

## 2023-05-03 DIAGNOSIS — G89.3 NEOPLASM RELATED PAIN: ICD-10-CM

## 2023-05-03 DIAGNOSIS — C50.811 MALIGNANT NEOPLASM OF OVERLAPPING SITES OF RIGHT BREAST IN FEMALE, ESTROGEN RECEPTOR POSITIVE: ICD-10-CM

## 2023-05-03 DIAGNOSIS — Z51.5 ENCOUNTER FOR PALLIATIVE CARE: ICD-10-CM

## 2023-05-03 DIAGNOSIS — Z17.0 MALIGNANT NEOPLASM OF OVERLAPPING SITES OF RIGHT BREAST IN FEMALE, ESTROGEN RECEPTOR POSITIVE: ICD-10-CM

## 2023-05-03 DIAGNOSIS — F32.A ANXIETY AND DEPRESSION: Primary | ICD-10-CM

## 2023-05-03 DIAGNOSIS — C50.919 PRIMARY MALIGNANT NEOPLASM OF BREAST WITH METASTASIS TO OTHER SITE, UNSPECIFIED LATERALITY: Primary | ICD-10-CM

## 2023-05-03 DIAGNOSIS — C77.3 MALIGNANT NEOPLASM METASTATIC TO LYMPH NODE OF AXILLA: ICD-10-CM

## 2023-05-03 PROCEDURE — 3075F PR MOST RECENT SYSTOLIC BLOOD PRESS GE 130-139MM HG: ICD-10-PCS | Mod: CPTII,,,

## 2023-05-03 PROCEDURE — 99999 PR PBB SHADOW E&M-EST. PATIENT-LVL IV: ICD-10-PCS | Mod: PBBFAC,,,

## 2023-05-03 PROCEDURE — 3075F SYST BP GE 130 - 139MM HG: CPT | Mod: CPTII,,,

## 2023-05-03 PROCEDURE — A9552 F18 FDG: HCPCS

## 2023-05-03 PROCEDURE — 3008F PR BODY MASS INDEX (BMI) DOCUMENTED: ICD-10-PCS | Mod: CPTII,,,

## 2023-05-03 PROCEDURE — 3079F DIAST BP 80-89 MM HG: CPT | Mod: CPTII,,,

## 2023-05-03 PROCEDURE — 78815 PET IMAGE W/CT SKULL-THIGH: CPT | Mod: 26,PS,, | Performed by: RADIOLOGY

## 2023-05-03 PROCEDURE — 99214 OFFICE O/P EST MOD 30 MIN: CPT | Mod: S$PBB,,,

## 2023-05-03 PROCEDURE — 1159F PR MEDICATION LIST DOCUMENTED IN MEDICAL RECORD: ICD-10-PCS | Mod: CPTII,,,

## 2023-05-03 PROCEDURE — 1159F MED LIST DOCD IN RCRD: CPT | Mod: CPTII,,,

## 2023-05-03 PROCEDURE — 3008F BODY MASS INDEX DOCD: CPT | Mod: CPTII,,,

## 2023-05-03 PROCEDURE — 63600175 PHARM REV CODE 636 W HCPCS: Performed by: INTERNAL MEDICINE

## 2023-05-03 PROCEDURE — 78815 NM PET CT ROUTINE: ICD-10-PCS | Mod: 26,PS,, | Performed by: RADIOLOGY

## 2023-05-03 PROCEDURE — 3079F PR MOST RECENT DIASTOLIC BLOOD PRESSURE 80-89 MM HG: ICD-10-PCS | Mod: CPTII,,,

## 2023-05-03 PROCEDURE — A4216 STERILE WATER/SALINE, 10 ML: HCPCS | Performed by: INTERNAL MEDICINE

## 2023-05-03 PROCEDURE — 25000003 PHARM REV CODE 250: Performed by: INTERNAL MEDICINE

## 2023-05-03 PROCEDURE — 99214 OFFICE O/P EST MOD 30 MIN: CPT | Mod: PBBFAC,25

## 2023-05-03 PROCEDURE — 78815 PET IMAGE W/CT SKULL-THIGH: CPT | Mod: TC

## 2023-05-03 PROCEDURE — 96523 IRRIG DRUG DELIVERY DEVICE: CPT

## 2023-05-03 PROCEDURE — 99999 PR PBB SHADOW E&M-EST. PATIENT-LVL IV: CPT | Mod: PBBFAC,,,

## 2023-05-03 PROCEDURE — 99214 PR OFFICE/OUTPT VISIT, EST, LEVL IV, 30-39 MIN: ICD-10-PCS | Mod: S$PBB,,,

## 2023-05-03 RX ORDER — HEPARIN 100 UNIT/ML
500 SYRINGE INTRAVENOUS
Status: DISCONTINUED | OUTPATIENT
Start: 2023-05-03 | End: 2023-05-03 | Stop reason: HOSPADM

## 2023-05-03 RX ORDER — SODIUM CHLORIDE 0.9 % (FLUSH) 0.9 %
10 SYRINGE (ML) INJECTION
Status: CANCELLED | OUTPATIENT
Start: 2023-05-03

## 2023-05-03 RX ORDER — SODIUM CHLORIDE 0.9 % (FLUSH) 0.9 %
10 SYRINGE (ML) INJECTION
Status: DISCONTINUED | OUTPATIENT
Start: 2023-05-03 | End: 2023-05-03 | Stop reason: HOSPADM

## 2023-05-03 RX ORDER — HYDROCODONE BITARTRATE AND ACETAMINOPHEN 10; 325 MG/1; MG/1
1 TABLET ORAL EVERY 6 HOURS PRN
Qty: 120 TABLET | Refills: 0 | Status: CANCELLED | OUTPATIENT
Start: 2023-05-03 | End: 2023-06-02

## 2023-05-03 RX ORDER — HEPARIN 100 UNIT/ML
500 SYRINGE INTRAVENOUS
Status: CANCELLED | OUTPATIENT
Start: 2023-05-03

## 2023-05-03 RX ORDER — HYDROCODONE BITARTRATE AND ACETAMINOPHEN 10; 325 MG/1; MG/1
1 TABLET ORAL EVERY 6 HOURS PRN
Qty: 120 TABLET | Refills: 0 | Status: ON HOLD | OUTPATIENT
Start: 2023-05-04 | End: 2023-06-04 | Stop reason: HOSPADM

## 2023-05-03 RX ADMIN — HEPARIN 500 UNITS: 100 SYRINGE at 02:05

## 2023-05-03 RX ADMIN — Medication 10 ML: at 02:05

## 2023-05-04 ENCOUNTER — PATIENT MESSAGE (OUTPATIENT)
Dept: PALLIATIVE MEDICINE | Facility: CLINIC | Age: 53
End: 2023-05-04
Payer: MEDICAID

## 2023-05-04 ENCOUNTER — TELEPHONE (OUTPATIENT)
Dept: HEMATOLOGY/ONCOLOGY | Facility: CLINIC | Age: 53
End: 2023-05-04
Payer: MEDICAID

## 2023-05-04 RX ORDER — DULOXETIN HYDROCHLORIDE 30 MG/1
30 CAPSULE, DELAYED RELEASE ORAL DAILY
Qty: 30 CAPSULE | Refills: 0 | Status: SHIPPED | OUTPATIENT
Start: 2023-05-04 | End: 2023-06-15 | Stop reason: SDUPTHER

## 2023-05-04 NOTE — PATIENT INSTRUCTIONS
Continue Norco 10 four times a day as needed for pain  Continue Morphine long acting 15mg twice a day   We will continue to monitor for changes in your kidney function while taking Morphine  Make sure to keep Narcan close by in case you get too sleepy from taking your pain medications  We may have to switch your morphine to another drug for long-acting pain management based on your kidney function  We will review your PET scan with Dr. Ba to further understand your the cause of your new pain  I will consult with pharmacist to review the best depression medication for you.   Inform EB if/when you want a referral to oncology therapist to further manage your depression.

## 2023-05-04 NOTE — TELEPHONE ENCOUNTER
Nurse placed call to patient to schedule follow up appointment to review PET scan results. Voicemail left for patient to return my call.

## 2023-05-05 ENCOUNTER — TELEPHONE (OUTPATIENT)
Dept: HEMATOLOGY/ONCOLOGY | Facility: CLINIC | Age: 53
End: 2023-05-05
Payer: MEDICAID

## 2023-05-05 NOTE — TELEPHONE ENCOUNTER
----- Message from Estefani Asencio MD sent at 5/4/2023  5:20 PM CDT -----  Regarding: Virtual follow-up?  Can you arrange virtual follow-up at least with me to discuss the imaging findings

## 2023-05-05 NOTE — TELEPHONE ENCOUNTER
Contacted pt and notified her of virtual appt scheduled with Dr. Asencio to discuss recent imaging. Pt agreed to date and time of appt.

## 2023-05-09 DIAGNOSIS — F41.9 ANXIETY: ICD-10-CM

## 2023-05-09 DIAGNOSIS — G89.3 NEOPLASM RELATED PAIN: ICD-10-CM

## 2023-05-09 NOTE — TELEPHONE ENCOUNTER
Nurse called to pt collect information regarding remaining morphine and xanax, pt stated she has enough meds until tomorrow, she is putting in a refill request not to wait until the last minute.

## 2023-05-10 RX ORDER — ALPRAZOLAM 1 MG/1
.5-1 TABLET ORAL 2 TIMES DAILY PRN
Qty: 60 TABLET | Refills: 0 | Status: SHIPPED | OUTPATIENT
Start: 2023-05-10 | End: 2023-06-13 | Stop reason: SDUPTHER

## 2023-05-10 RX ORDER — MORPHINE SULFATE 15 MG/1
15 TABLET, FILM COATED, EXTENDED RELEASE ORAL 2 TIMES DAILY
Qty: 60 TABLET | Refills: 0 | Status: SHIPPED | OUTPATIENT
Start: 2023-05-10 | End: 2023-06-09

## 2023-05-11 ENCOUNTER — OFFICE VISIT (OUTPATIENT)
Dept: HEMATOLOGY/ONCOLOGY | Facility: CLINIC | Age: 53
End: 2023-05-11
Payer: MEDICAID

## 2023-05-11 DIAGNOSIS — C77.3 MALIGNANT NEOPLASM METASTATIC TO LYMPH NODE OF AXILLA: Primary | ICD-10-CM

## 2023-05-11 PROCEDURE — 99214 OFFICE O/P EST MOD 30 MIN: CPT | Mod: 95,,, | Performed by: INTERNAL MEDICINE

## 2023-05-11 PROCEDURE — 99214 PR OFFICE/OUTPT VISIT, EST, LEVL IV, 30-39 MIN: ICD-10-PCS | Mod: 95,,, | Performed by: INTERNAL MEDICINE

## 2023-05-11 NOTE — PROGRESS NOTES
The patient location is:  [facility]  The chief complaint leading to consultation is:  Follow-up    Visit type: audiovisual    Face to Face time with patient:  5 minutes  30 minutes of total time spent on the encounter, which includes face to face time and non-face to face time preparing to see the patient (eg, review of tests), Obtaining and/or reviewing separately obtained history, Documenting clinical information in the electronic or other health record, Independently interpreting results (not separately reported) and communicating results to the patient/family/caregiver, or Care coordination (not separately reported).         Each patient to whom he or she provides medical services by telemedicine is:  (1) informed of the relationship between the physician and patient and the respective role of any other health care provider with respect to management of the patient; and (2) notified that he or she may decline to receive medical services by telemedicine and may withdraw from such care at any time.    Notes:     Subjective:      DATE OF VISIT: 5/11/2023   ?   Patient ID:?Josey Flores is a 52 y.o. female.?? MR#: [id number]   ?   PRIMARY PROVIDER: Dr. Asencio  ?   CHIEF COMPLAINT:  Follow-up  ?   ONCOLOGIC DIAGNOSIS:      Stage IV cervical squamous cell carcinoma    stage IV, T2 N1b M1, invasive breast carcinoma, ER/MS+, HER2-    Papillary thyroid carcinoma status post total thyroidectomy on 08/31/2017, mpT1b N0     Cervical HSIL MIREILLE 3 s/p LEEP, 2017  ?   CURRENT TREATMENT:    Letrozole and palbociclib    PAST TREATMENT:    Palliative chemotherapy: cisplatin, paclitaxel and bevacizumab; 02/22/2021 cycle 1 day 1 -> bevacizumab maintenance after 6 cycles  Carboplatin, paclitaxel and pembrolizumab, C1D1 1/28/22; maintenance pembrolizumab, d/c 9/2022    INTERVAL EVENTS  She returns for follow-up after restaging PET due to pain in rib/breast region.  Prior abdominal pain has resolved with diet modification.  She  denies known respiratory infection recent COVID new cough chest pain shortness of breath or fever chills.    ROS  A comprehensive 14-point review of systems was reviewed with patient and was negative other than as specified above.   ?     Objective:      Physical Exam    Limited due to virtual visit    There were no vitals filed for this visit.     ECOG:?0   General appearance: Generally well appearing, in no acute distress.   Head, eyes, ears, nose, and throat: moist mucous membranes.  Pain bilateral ear, stable  Respiratory:  Normal work of breathing  Psychiatric:  Normal mood and affect.      Laboratory:  ?   No visits with results within 1 Day(s) from this visit.   Latest known visit with results is:   Lab Visit on 04/11/2023   Component Date Value Ref Range Status    WBC 04/11/2023 3.60 (L)  3.90 - 12.70 K/uL Final    RBC 04/11/2023 2.84 (L)  4.00 - 5.40 M/uL Final    Hemoglobin 04/11/2023 11.0 (L)  12.0 - 16.0 g/dL Final    Hematocrit 04/11/2023 31.8 (L)  37.0 - 48.5 % Final    MCV 04/11/2023 112 (H)  82 - 98 fL Final    MCH 04/11/2023 38.7 (H)  27.0 - 31.0 pg Final    MCHC 04/11/2023 34.6  32.0 - 36.0 g/dL Final    RDW 04/11/2023 15.4 (H)  11.5 - 14.5 % Final    Platelets 04/11/2023 191  150 - 450 K/uL Final    MPV 04/11/2023 11.1  9.2 - 12.9 fL Final    Immature Granulocytes 04/11/2023 CANCELED  0.0 - 0.5 % Final-Edited    Immature Grans (Abs) 04/11/2023 CANCELED  0.00 - 0.04 K/uL Final-Edited    nRBC 04/11/2023 0  0 /100 WBC Final    Gran % 04/11/2023 52.0  38.0 - 73.0 % Final    Lymph % 04/11/2023 44.0  18.0 - 48.0 % Final    Mono % 04/11/2023 3.0 (L)  4.0 - 15.0 % Final    Eosinophil % 04/11/2023 1.0  0.0 - 8.0 % Final    Basophil % 04/11/2023 0.0  0.0 - 1.9 % Final    Platelet Estimate 04/11/2023 Appears normal   Final    Aniso 04/11/2023 Slight   Final    Poik 04/11/2023 Slight   Final    Poly 04/11/2023 Occasional   Final    Ovalocytes 04/11/2023 Occasional   Final     Differential Method 04/11/2023 Manual   Corrected    Sodium 04/11/2023 138  136 - 145 mmol/L Final    Potassium 04/11/2023 3.9  3.5 - 5.1 mmol/L Final    Chloride 04/11/2023 103  95 - 110 mmol/L Final    CO2 04/11/2023 25  23 - 29 mmol/L Final    Glucose 04/11/2023 127 (H)  70 - 110 mg/dL Final    BUN 04/11/2023 22 (H)  6 - 20 mg/dL Final    Creatinine 04/11/2023 1.5 (H)  0.5 - 1.4 mg/dL Final    Calcium 04/11/2023 9.1  8.7 - 10.5 mg/dL Final    Total Protein 04/11/2023 6.6  6.0 - 8.4 g/dL Final    Albumin 04/11/2023 3.9  3.5 - 5.2 g/dL Final    Total Bilirubin 04/11/2023 0.4  0.1 - 1.0 mg/dL Final    Alkaline Phosphatase 04/11/2023 66  55 - 135 U/L Final    AST 04/11/2023 12  10 - 40 U/L Final    ALT 04/11/2023 19  10 - 44 U/L Final    Anion Gap 04/11/2023 10  8 - 16 mmol/L Final    eGFR 04/11/2023 41 (A)  >60 mL/min/1.73 m^2 Final    Free T4 04/11/2023 0.93  0.71 - 1.51 ng/dL Final    TSH 04/11/2023 20.163 (H)  0.400 - 4.000 uIU/mL Final      Lab Results   Component Value Date    WBC 3.60 (L) 04/11/2023    HGB 11.0 (L) 04/11/2023    HCT 31.8 (L) 04/11/2023     (H) 04/11/2023     04/11/2023         Chemistry        Component Value Date/Time     04/11/2023 1012    K 3.9 04/11/2023 1012     04/11/2023 1012    CO2 25 04/11/2023 1012    BUN 22 (H) 04/11/2023 1012    CREATININE 1.5 (H) 04/11/2023 1012     (H) 04/11/2023 1012        Component Value Date/Time    CALCIUM 9.1 04/11/2023 1012    ALKPHOS 66 04/11/2023 1012    AST 12 04/11/2023 1012    ALT 19 04/11/2023 1012    BILITOT 0.4 04/11/2023 1012    ESTGFRAFRICA 60 07/23/2022 1922    EGFRNONAA 52 (A) 07/23/2022 1922          ? IMAGING   Results for orders placed or performed during the hospital encounter of 05/03/23 (from the past 2160 hour(s))   NM PET CT Routine FDG    Impression    There is a focal mildly hypermetabolic ground-glass opacity in the left lower lobe which is new from the prior examination.  This is  more likely infectious/inflammatory in nature.  Short-term CT chest follow-up is advised.    The study is otherwise unremarkable with no additional areas of abnormal FDG uptake.      Electronically signed by: Zaid Brito MD  Date:    05/03/2023  Time:    16:41     *Note: Due to a large number of results and/or encounters for the requested time period, some results have not been displayed. A complete set of results can be found in Results Review.         No results found. However, due to the size of the patient record, not all encounters were searched. Please check Results Review for a complete set of results.    PATHOLOGY  2/2021  Station 7 lymph node, fine needle aspiration (8 smears, 1 cell block):       -  Positive for malignant cells, morphologically consistent with   metastatic squamous cell carcinoma     Assessment/Plan:       No diagnosis found.            Plan:     # metastatic squamous cell carcinoma of the cervix SCC cervix:  history of HSIL Status post LEEP 2017. In December 2020 follow-up with gynecology with new spotty vaginal bleeding/discharge with biopsy 12/7/20 showing squamous cell carcinoma consistent with cervical primary.  MRI pelvis performed showing locally advanced disease with 4.9 cm cervical mass with right parametrial involvement and enlarged right external iliac lymph node 1.5 x 1.2 cm. PET-CT showing avidity of cervical mass with extension to lower uterine segment, right vaginal focus of avidity and lymphadenopathy including right internal external iliac, periaortic, pericaval.  There is the new hypermetabolic lymphadenopathy in right subcarinal 2.3 cm SUV 5.8. Small 9 mm right paratracheal S base slightly increased no FDG avid SUV 2.4.  01/29/2021 multidisciplinary tumor board discussion of her case with review of imaging; concern for new avid lymphadenopathy particularly in right subcarinal may be most consistent with metastatic cervical squamous cell carcinoma; tumor board  recommendation for biopsy to confirm for prognostic information as well as given other concomitant malignancy to exclude alternative histology, although felt less likely metastatic breast given this is been well controlled and primarily bony disease involvement.  We discussed recommendation for systemic chemotherapy given advanced cervical squamous cell carcinoma with cisplatin, paclitaxel and bevacizumab with discontinuation of palbociclib but can continue aromatase inhibitor; taxane may also be active for her concomitant metastatic breast cancer.   - EBUS with biopsy 2/18/21, station 7 consistent with metastatic squamous cell carcinoma of cervical origin.  - STRATA requested on cervical SCC, ERBB3 <5% felt to be subclonal per report, PIK3CA amplification, WQ3692, KDM6A, FRANK, TMB low, PDL1 high may indicated sensitivity to check point inhibitor in future regimen.  - audiology exam 2/18/21, recommended that she let us know if any perceived change in her hearing throughout treatment and recommend repeat testing throughout to ensure no ototoxicity.  - [facility] germline genetic testing returned negative for mutation, provided to patient.  - cycle 1 day 1 cisplatin paclitaxel and bevacizumab on 02/22/2021  Repeat scans 04/26/2021 showed excellent improvement in thoracic and pelvic lymphadenopathy and primary cervical mass.  She is asymptomatic from this no further bleeding.  Recommended continuation of her current regimen which she is tolerating well with supportive care, IV fluids 3 times weekly per patient preference.  - restaging after cycle 6 she has continued improvement in metastatic cervical squamous cell carcinoma; avidity in left vulvar area diminished associated with known infection.  She has required substantial supportive care on chemotherapy and recommend given sustained improvement on scans continuing and absence of chemotherapy with bevacizumab alone and continue close surveillance.  She would like to  continue IV fluids weekly as needed.    We reviewed in detail restaging PET-CT January 2022 notable for uptake in cervical region concerning for oligo progression/recurrence of her disease.  Previously biopsy proven metastatic cervical cancer in lungs without evidence of recurrent mass/lymphadenopathy/avidity in this region or otherwise.    Given prior PDL1 high disease CPS >1 we decided to proceed with chemoimmunotherapy per Keynote 826, carboplatin, paclitaxel and pembrolizumab.   She completed 6 cycles of carboplatin paclitaxel with pembrolizumab (1 cycle without pembrolizumab from unclear reasons with my colleague) with interval brachytherapy with Dr. Vázquez at [facility].    Restaging PET scan with diminished avidity vaginal cuff status post brachytherapy.  No new areas concerning for active disease.  We discussed consideration of holding further maintenance immunotherapy at this time given no evidence of recurrent / progressive metastatic disease and is amenable to this.  Will continue her breast cancer treatment per below.    Restaging scan  1/11/2022 without evidence of recurrent metastatic disease active.  She is had new rib pain recommend PET scan restaging due to history of metastatic breast and cervical cancers to determine status of diseases.  Scan with mildly hypermetabolic ground-glass opacity left lower lobe which is new possible infectious inflammatory.  No notable infectious symptoms.  Consent to Pulmonary and short interval repeat CT ordered.    # Hypothyroidism:  T4 normal range. Increased dose of levothyroxine currently on to 75 mcg daily.        Macrocytosis: Possibly related to thyroid disorder will continue to monitor.  She is on vitamin-B supplement.  No new anemia.    # neuropathy:  Mild, will monitor with re-initiation of chemotherapy per above.    # metastatic breast cancer ER positive HER2 negative:  Had been on systemic therapy with palbociclib and letrozole.   Tolerating well.  Had been on hold with ENT issues okay to restart at this time.  She understands if concern for active infection to notify us as may need to be held.    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    # history of papillary thyroid carcinoma status post total thyroidectomy on 08/31/2017: s/p total thyroidectomy on levothyroxine.     # tobacco abuse:  Current tobacco use.   I have counseled for at least 3 min on the importance of tobacco cessation and discussed pharmacologic and therapy options to  aid in cessation.  She is interested in trial of bupropion prescribed to her local pharmacy.    Follow-Up:       Route Chart for Scheduling    Med Onc Chart Routing      Follow up with physician 2 months.   Follow up with MIGUEL    Infusion scheduling note    Injection scheduling note    Labs CBC, CMP, TSH and free T4   Scheduling:  Preferred lab:  Lab interval:  in 2 mo with CT   Imaging Other   CT chest in 2 mo   Pharmacy appointment    Other referrals         Therapy Plan Information  INF FLUIDS  IV Fluids  sodium chloride 0.9% bolus 1,000 mL  1,000 mL, Intravenous, PRN  Flushes  sodium chloride 0.9% flush 10 mL  10 mL, Intravenous, PRN  heparin, porcine (PF) 100 unit/mL injection flush 500 Units  500 Units, Intravenous, PRN    PORT FLUSH  Flushes  heparin, porcine (PF) 100 unit/mL injection flush 500 Units  500 Units, Intravenous, Every visit  sodium chloride 0.9% flush 10 mL  10 mL, Intravenous, Every visit

## 2023-05-15 ENCOUNTER — PATIENT MESSAGE (OUTPATIENT)
Dept: PHARMACY | Facility: CLINIC | Age: 53
End: 2023-05-15
Payer: MEDICAID

## 2023-05-15 ENCOUNTER — TELEPHONE (OUTPATIENT)
Dept: HEMATOLOGY/ONCOLOGY | Facility: CLINIC | Age: 53
End: 2023-05-15
Payer: MEDICAID

## 2023-05-15 NOTE — TELEPHONE ENCOUNTER
SW called pt re: recent distress score. Pt stated that score was r/t her health and her car being down. SW provided support and encouragement. Pt stated that she has pain meds to manage her pain. SW offered transportation to appt,'s but pt stated her car should be repaired within the next day or two. No other needs expressed. Pt will call SW if needs arise. SW will remain available.

## 2023-05-18 ENCOUNTER — PATIENT MESSAGE (OUTPATIENT)
Dept: PHARMACY | Facility: CLINIC | Age: 53
End: 2023-05-18
Payer: MEDICAID

## 2023-05-19 ENCOUNTER — SPECIALTY PHARMACY (OUTPATIENT)
Dept: PHARMACY | Facility: CLINIC | Age: 53
End: 2023-05-19
Payer: MEDICAID

## 2023-05-19 NOTE — TELEPHONE ENCOUNTER
Specialty Pharmacy - Refill Coordination    Specialty Medication Orders Linked to Encounter      Flowsheet Row Most Recent Value   Medication #1 palbociclib (IBRANCE) 125 mg Cap (Order#[id number], Rx#[id number])            Refill Questions - Documented Responses      Flowsheet Row Most Recent Value   Refill Screening Questions    Changes to allergies? No   Changes to medications? No   New conditions since last clinic visit? No   Unplanned office visit, urgent care, ED, or hospital admission in the last 4 weeks? No   How does patient/caregiver feel medication is working? Good   Financial problems or insurance changes? No   How many doses of your specialty medications were missed in the last 4 weeks? 0   Would patient like to speak to a pharmacist? No   When does the patient need to receive the medication? 05/28/23   Refill Delivery Questions    How will the patient receive the medication? MED[facility]   When does the patient need to receive the medication? 05/28/23   Shipping Address Home   Address in [facility] confirmed and updated if neccessary? Yes   Expected Copay ($) 0   Is the patient able to afford the medication copay? Yes   Payment Method zero copay   Days supply of Refill 28   Supplies needed? No supplies needed   Refill activity completed? Yes   Refill activity plan Refill scheduled   Shipment/Pickup Date: 05/24/23            Current Outpatient Medications   Medication Sig    albuterol (PROVENTIL/VENTOLIN HFA) 90 mcg/actuation inhaler Inhale 2 puffs into the lungs every 4 (four) hours as needed for Wheezing or Shortness of Breath.    ALPRAZolam (XANAX) 1 MG tablet Take 0.5-1 tablets (0.5-1 mg total) by mouth 2 (two) times daily as needed for Anxiety.    buPROPion (WELLBUTRIN SR) 150 MG TBSR 12 hr tablet Take 1 tablet (150 mg total) by mouth 2 (two) times daily. Take 1 tablet (150mg) once daily for 1 week then increase to twice daily    calcium carbonate 400 mg calcium (1,000 mg) Chew Take 2,000 mg by  mouth once daily.    cyanocobalamin (VITAMIN B-12) 1000 MCG tablet Take 1 tablet (1,000 mcg total) by mouth once daily.    diphenoxylate-atropine 2.5-0.025 mg (LOMOTIL) 2.5-0.025 mg per tablet Take 1 tablet by mouth 4 (four) times daily as needed for Diarrhea.    DULoxetine (CYMBALTA) 30 MG capsule Take 1 capsule (30 mg total) by mouth once daily. It may take up to 2 to 6 weeks to see full effect of Cymbalta.    ergocalciferol (VITAMIN D2) 50,000 unit Cap Take 5,000 Units by mouth once daily at 6am.     HYDROcodone-acetaminophen (NORCO)  mg per tablet Take 1 tablet by mouth every 6 (six) hours as needed for Pain. No more than 4 doses per day    hydrOXYzine HCL (ATARAX) 25 MG tablet Take 1 tablet (25 mg total) by mouth 3 (three) times daily as needed for Itching.    ipratropium (ATROVENT) 42 mcg (0.06 %) nasal spray 2 sprays by Nasal route 4 (four) times daily. As needed for rhinitis. Take in evening before bed and in the morning    letrozole (FEMARA) 2.5 mg Tab Take 1 tablet (2.5 mg total) by mouth once daily.    levothyroxine (SYNTHROID) 200 MCG tablet Take 1 tablet (200 mcg total) by mouth once daily.    levothyroxine (SYNTHROID) 75 MCG tablet Take 1 tablet (75 mcg total) by mouth before breakfast.    morphine (MS CONTIN) 15 MG 12 hr tablet Take 1 tablet (15 mg total) by mouth 2 (two) times daily.    multivitamin (THERAGRAN) per tablet Take 1 tablet by mouth once daily.    naloxone (NARCAN) 4 mg/actuation Spry SMARTSIG:Both Nares    palbociclib (IBRANCE) 125 mg Cap Take one capsule (125 mg) by mouth once daily on days 1-21 of each 28-day cycle.    pentoxifylline (TRENTAL) 400 mg TbSR Take 1 tablet (400 mg total) by mouth 2 (two) times a day.    potassium chloride SA (K-DUR,KLOR-CON) 20 MEQ tablet Take 1 tablet (20 mEq total) by mouth once daily. Take daily for 3 days.    vitamin E 1000 UNIT capsule Take 1 capsule (1,000 Units total) by mouth once daily.   Last reviewed on 5/3/2023  3:31 PM by Antonieta Sanches,  LPN    Review of patient's allergies indicates:   Allergen Reactions    Gabapentin Swelling     Note: unilateral joint edema, unclear if due to gabapentin    Nsaids (non-steroidal anti-inflammatory drug) Other (See Comments)     Instructed not to take NSAID drugs with chemo pill.    Clindamycin Rash    Vancomycin analogues Itching    Last reviewed on  5/3/2023 3:31 PM by Antonieta Sanches      Tasks added this encounter   No tasks added.   Tasks due within next 3 months   5/22/2023 - Refill Coordination Outreach (1 time occurrence)     Aydee Harry, PharmD  [facility]  [address] [place] [id number]  Phone: [phone]  Fax: [phone]

## 2023-05-25 NOTE — PROGRESS NOTES
Palliative Medicine         Follow up    SUBJECTIVE:     Josey Flores is a 52 y.o. female with history of metastatic breast cancer, thyroid cancer post thyroidectomy (2017), and cervical cancer (2017). She is currently completed chemotherapy. PET scan in January 2023 showed no evidence of recurrence or metastatic disease. She is followed by Dr. Ba.   The patient location is: Palliative care clinic ([facility])  The chief complaint leading to consultation is:  Follow-up     Visit type: Telephone Audio only. Unable to connect to visual feed.     Face to Face time with patient:  30 mins  30 minutes of total time spent on the encounter, which includes face to face time and non-face to face time preparing to see the patient (eg, review of tests), Obtaining and/or reviewing separately obtained history, Documenting clinical information in the electronic or other health record, Independently interpreting results (not separately reported) and communicating results to the patient/family/caregiver, or Care coordination (not separately reported).      Each patient to whom he or she provides medical services by telemedicine is:  (1) informed of the relationship between the physician and patient and the respective role of any other health care provider with respect to management of the patient; and (2) notified that he or she may decline to receive medical services by telemedicine and may withdraw from such care at any time.     Palliative Medicine         Follow up    SUBJECTIVE:     Josey Flores is a 52 y.o. female with history of metastatic breast cancer, thyroid cancer post thyroidectomy (2017), and cervical cancer (2017). She has completed chemotherapy. PET scan in January 2023 showed no evidence of recurrence or metastatic disease. She is followed by Dr. Ba.     Chief complaint: New pain -follow up    5/03/2023: Pt attended clinic with her partner, Alessio following her PET Scan. I was unable to see pt via video  feed due to technical issues on Epic. Thus, we conducted the visit via phone.     Pt reported her bilateral rib pain is persistent, but managed with MS ER 15mg BID and Norco 10mg QID. She reported anxiety/depression, insomnia, and restlessness due to her pain and PET scan today. She consented to starting an antidepressant for anxiety/depression management.   EKG completed 4/19/2023 showed NSR 75, low voltage QRS with Qtc 475ms.      Past visits:      4/18/2023: Pt attended clinic with her partner, Alessio. She reported new bilateral under the rib and lower back pain that has started and worsened over the last two months. She stated she discussed this with Dr. Ba during her appointment on April 13, and a PET scan was ordered to assess for recurrent or metastatic disease. She denied, fever, chills, sweats, or dyspnea. She stated she has been taking Norco 10 four times a day, and MS ER 15mg Daily at night. She states she does not take MS ER in the morning due to fear of sleeping during the day. Pt also reported diarrhea, which has improved with Lomotil, in addition to increased anxiety and depression due to worsening pain.   I encouraged her to take Norco 10mg QID PRN for pain, and MS ER 15mg BID if her pain continues to worsen, and she is fine with mild sedation. Pt stated she is only comfortable with taking MSER at night. I noted she last filled her Norco   Pt stated she is willing to try an antidepressant for her mood, and she had stopped taking Wellbutrin about a month ago due to palpitations. I informed her that we will complete an EKG to assess her Qtc interval prior to starting an antidepressant. Her last EKG was in May 2021 showing Qtc of 435. Pt agreed to complete EKG.     2/14/2023: Patient comes to clinic with her significant other Alessio. We discussed her recent visits with Dr. Asencio and Deo. She is in good spirits as her Restaging PET scan 1/11/2023 without evidence of recurrent metastatic disease  active and no new areas concerning for active disease. She noted she was instructed to continue current treatment for breast cancer and surveillance for cervical cancer. She denies any issues and notes that she continues to be active and her and significant other are going out to eat after visit. She notes that she has intermittent pelvic pain in which she attributes to radiation but notes that current pain medication effective in managing. She notes that it does not happen often. In the setting of stable scans and continued improvement clinically, we dicussed continuing opoid wean. Patient currently taking MSER 15mg po BID and taking 3-4 doses of Norco daily. Will decrease MSER to 15mg po Daily and continue current hydrocodone regimen not to exceed 4 doses in a day. Will follow up in 4 weeks to continue opoid wean. No new scripts needed as MSER and hydrocodone were just filled on 2/8/23.     12/1/22: Patient comes to clinic with her significant other Alessio. She is still feeling sad because of her grand daughter since she moved out. She noted that she stopped taking her Wellbutrin but wants to start back taking it to help with her mood also to help her with the process of quitting smoking. Also she notes she has been dealing with worsening pain to her left ear. She saw Otolaryngologist recently and he noted no signs of changes on CT and offered patient surgery or non surgical management. Patient notes that she did not want surgery at that time because her doctor made her aware that the procedure may help but there is a possibility that it may not. She notes that the pain and drainage has worsened and she is now thinking about proceeding with surgery even if there is a risk that it will not work. She has been in touch with her Otolaryngologist's office to schedule appointment. She last saw Dr. Asencio 10/13/22 in which there were no changes and she was to continue on current treatment withLetrozole and palbociclib.  Her visit with Gyn/onc also noted no new changes and everything was stable. During exam patient reports pelvic pain but notes that medication has been effective in managing it. Being that there were no changes with all of her MD visits, I spoke with patient about continuing plan from her previous PM visit with weaning MSER. She is in agreement, we will wean down to MSER 15mg po BID. I did make patient aware that medications provided by me were not for her ear pain or or acute issues she may experience but it is indicated for malignant pain. I reinforced education about taking medications as prescribed and for what it is indicated for. I made her aware that we could not send in another early prescription as my counterpart had to do in November. On 11/1/22,  patient sent a message noting she ran out of her medication before scheduled visit. She reported that she was [facility] the [facility] due to abdominal pain and then when she was discharged she cut her foot very badly and had to receive stiches. Due to these issues, she used her medications more frequently than she was prescribed. She was taking 2 tabs of MSER 30mg BID instead of 1 BID. She was taking hydrocodone 4-5x/ day. She admitted that she was not supposed to do this and will not do this again. She was hoping that provider would write the 15mg ER tabs to hold her over. She explained why she would not write for the 15mg and typically do not write early refills. This is the second time that she has taken ER medications more frequently than prescribed. She was counseled gain on the risks with this behavior and this would be her last warning as well as last early refill. Moving forward if she takes opioids other than rx this is grounds for dismissal from our clinic given issues with impulse control and safety. She expressed understanding and said that her  has also scolded her. She knows this was not a good choice and continues to apologize and  promised she will not do it again. My partner explained our boundaries in clinic are not punitive but rather in place for her safety. I was present with my counterpart for the conversation and agreed that early refills will be not be permitted moving forward    10/06/2022  She comes to clinic with DEYSI Mccallum today. She is down because her granddaughter is not in a good situation but otherwise feeling okay. Since we last saw each other her pain has improved which is likely related to the positive response to treatment. She would like to begin weaning the MSER back to 30mg BID and hopefully continue to decrease further. She is still taking hydrocodone 10mg QID with good response. Her mood improved with the resumption of Wellbutrin and is still cutting back on her tobacco use. She has been having ear pain which is being evaluated by ENT and is waiting to hear when her CT is scheduled. There seems to have been a charting error with her Letrozole resulting in d/c the order and unable to refill. I will reach out to oncology to reorder. We will follow up in 2 months and she will message me for refills whether she would like to continue weaning before then. We do not have HCPOA on file even though she remembers bringing to clinic. She will bring again next visit and confirms that her sister David is HCPOA.    09/01/22: Ms. Flores comes to clinic today with her fiance Alessio. She reports that her lower abdominal pain has worsened since brachytherapy started several weeks ago. She reports spontaneous lower abdominal pain not related to food but sometimes exacerbated by BM. She denies constipation. She has taken an extra dose of MSER at bedtime to help with sleep. We talked about the risks associated with this and would advise against doing this again. She is taking hydrocodone q4hr sometimes waking up at night and needing another dose averaging 4-6 in a day. I recommend we increase the long acting dose in hopes for better control  "and less PRN usage. She says her sleep is poor because this is when the pain intensifies (lying flat) but also anxiety and sadness since her granddaughter moved out of the home. She is taking Xanax HS which helps. She was taking Wellbutrin in the last but not sure why she stopped. For now lets not resume this and evaluate at her next visit.    06/16/2022: Patient doing okay.  No new issues since her last visit.  She continue to have some lower GI pains describes now as "cramping after a BM".  Previously, her discomfort was after eating.  She has been referred to GI.  She has some fatigue but managing.  Pain is controlled on current regimen.  She has upcoming scans and appointment with Dr. Asencio.  She has some anxiety and life stressors affecting her situation.  Her son and his ex-girfriend (now in [place]) can no longer care for their 4 year old daughter.  She and her partner are assuming the parental rights of her granddaughter.  They seem to be coping as well as can be expected.  Overall, she is doing okay.  We will continue to follow at quarterly intervals.  She knows to reach out if her situation changes or symptom burden worsens.    03/10/2022: Overall, patient seems to be doing okay.  She has some new GI issues with abdominal pain after eating and intermittent diarrhea not felt to be related to her disease or treatment.  Oncology discussed GI referral and she is considering this if no improvement.  We are adjusting her pain medication regimen today mostly to cut back on her prn Norco which she continues to use consistently at 5-6 tablets/day.  She knows she can reach out for additional questions or concerns.     01/06/2022: Patient is a 51 y.o. year old female presenting with for palliative follow up for her metastatic breast cancer, SCC of the cervix. Treatments currently on hold due to recurrent ear infection. She is anxious about results of her recent scan and fearful of disease progression while off " treatment.  Otherwise, her pain continues to be controlled on her current regimen.  No new symptoms or complaints today.    ONCOLOGY DIAGNOSES:  Stage IV cervical squamous cell carcinoma   stage IV, T2 N1b M1, invasive breast carcinoma, ER/GA+, HER2-   Papillary thyroid carcinoma status post total thyroidectomy on 08/31/2017, mpT1b N0    Cervical HSIL MIREILLE 3 s/p LEEP, 2017    Patient looks and feels better compared to last visit.  She walked into her appt.  Ear is better and she is being followed by ENT.  She has resumed treatment for both her cervical and breast cancer and is followed closely by medical oncology and gyn oncology.  Pain continues to be controlled on current medication regimen.  She is having knee issues with swelling and likely OA.  We discussed referral to orthopedics.  She also plans to follow up with neurosurgery.  Overall, no major changes and no new issues.     LA TERESSA reviewed and summarized:      OBJECTIVE:     ROS:  Review of Systems   Constitutional:  Positive for activity change and fatigue. Negative for appetite change, chills and fever.   HENT:  Negative for mouth sores, sore throat and trouble swallowing.    Respiratory:  Negative for cough, chest tightness and shortness of breath.    Gastrointestinal: Negative for nausea, abdominal distention, abdominal pain, constipation, diarrhea and vomiting.   Genitourinary:  Negative for difficulty urinating and dysuria.   Musculoskeletal:  Negative for back pain and myalgias.   Skin:  Negative for rash and wound.   Allergic/Immunologic: Negative for immunocompromised state.   Neurological:  Negative for weakness and headaches.   Psychiatric/Behavioral:  Positive for agitation, dysphoric mood and sleep disturbance. Negative for confusion and decreased concentration. The patient is nervous/anxious.      Review of Symptoms      Symptom Assessment (ESAS 0-10 Scale)  Pain:  8  Dyspnea:  0  Anxiety: 7  Nausea: 0  Depression:  5  Anorexia:  0  Fatigue:   0  Insomnia:  8  Restlessness:  8  Agitation: 8     CAM / Delirium:  Negative  Constipation:  Negative  Diarrhea:  Negative    Anxiety:  Is nervous/anxious  Constipation:  No constipation    Bowel Management Plan (BMP):  Yes      Pain Assessment:    Location(s): chest and back (New substernal/rib pain)    Back       Location: lower        Quality: Aching        Quantity: 8/10 in intensity        Chronicity: Onset 2 month(s) ago, gradually worsening        Aggravating Factors: Activity        Alleviating Factors: Opiates       Associated Symptoms: Myalgias  Chest       Location: bilateral        Quality: Sharp        Quantity: 8/10 in intensity        Chronicity: Onset 2 month(s) ago, gradually worsening since Pt reported bilateral under the rib and lower back pain, started two months ago and worsening. She stated Dr. Ba is aware, and has ordered a PET Scan on May 3, 2023 to assess for recurrent or metastatic disease.        Aggravating Factors: Activity        Alleviating Factors: Opiates        Associated Symptoms: Myalgias    Modified Osmar Scale:  0    ECOG Performance Status rdGrdrrdarddrderd:rd rd3rd Living Arrangements:  Lives with spouse    Psychosocial/Cultural:   See Palliative Psychosocial Note: No  Lives with her significant other of 16 years: they have 3 children combined, 2 sons from previous marriage. Granddaughter recently moved out of the home  **Primary  to Follow**  Palliative Care  Consult: No    Advance Care Planning   Advance Directives:   Living Will: Yes        Copy on chart: Yes    LaPOST: Yes    Do Not Resuscitate Status: Yes    Agent's Name:  David Acevedo, sister, [phone]    Decision Making:  Patient answered questions  Goals of Care: What is most important right now is to focus on remaining as independent as possible, symptom/pain control, curative/life-prolongation (regardless of treatment burdens). Accordingly, we have decided that the best plan to meet the patient's  goals includes continuing with treatment.      Physical Exam: Unable to perform due to phone visit.    Radiology and laboratory data reviewed    ASSESSMENT/PLAN:     Malignant neoplasm of overlapping sites of right breast in female, estrogen receptor positive  -Followed by Dr. Asencio  -Restaging scan  1/11/2022 without evidence of recurrent metastatic disease active.   -PET scan today to re-assess etiology of pain.     Malignant neoplasm of endocervix  -Followed by  noted JUAN FRANCISCO, follow up in 3 months   -Restaging scan 1/11/2022 without evidence of recurrent metastatic disease active.  -Holding further maintenance immunotherapy at this time given no evidence of recurrent / progressive metastatic disease    -Surveillance for cervical cancer.    Neoplasm-related pain   -Continue MSER 15mg BID from daily, due to new pain. We will defer opioid weaning due to new rib pain  -Checked with pharmacy. Ok to continue MS Contin if GFR >30  -Pending PET scan today to assess for possible neoplasm recurrence  -Continue Hydrocodone 10mg QID PRN not to exceed 4 doses in a day  UDS repeat at next appointment  Narcan rx- Pt stated she knows how to use it      Anxiety/Depression  -Likely worsened by new pain and fear of cancer recurrence  -Continue Xanax 0.5mg-1mg BID PRN  -Checked with pharmacy. OK to start Cymbalta 30mg Daily  -Initiate Oncology behavioral health referral per pt's preference    Palliative Care Encounter  -HCPOA:  David Acevedo, sister, [phone]  -Code Status: DNR; LaPOST completed and uploaded in EMR  -[facility]-Continue cancer treatment with surveillance and symptom management.      Follow up: 4 weeks to assess pain and mood    30 minutes in the electronic or other health record, Independently interpreting results (not separately reported) and communicating results to the patient/family/caregiver, or Care coordination (not separately reported).    Signature: SUZIE GOTTI NP

## 2023-05-25 NOTE — PROGRESS NOTES
The patient location is: Palliative care clinic (formerly [facility])  The chief complaint leading to consultation is:  Follow-up     Visit type: Audio only. Unable to connect to visual feed.     Face to Face time with patient:  30 mins  minutes of total time spent on the encounter, which includes face to face time and non-face to face time preparing to see the patient (eg, review of tests), Obtaining and/or reviewing separately obtained history, Documenting clinical information in the electronic or other health record, Independently interpreting results (not separately reported) and communicating results to the patient/family/caregiver, or Care coordination (not separately reported).      Each patient to whom he or she provides medical services by telemedicine is:  (1) informed of the relationship between the physician and patient and the respective role of any other health care provider with respect to management of the patient; and (2) notified that he or she may decline to receive medical services by telemedicine and may withdraw from such care at any time.     Palliative Medicine         Follow up    SUBJECTIVE:     Josey Flores is a 52 y.o. female with history of metastatic breast cancer, thyroid cancer post thyroidectomy (2017), and cervical cancer (2017). She is currently completed chemotherapy. PET scan in January 2023 showed no evidence of recurrence or metastatic disease. She is followed by Dr. Ba.     Chief complaint: New pain -follow up    5/03/2023: Pt attended clinic with her partner, Alessio. I was unable to see pt via video feed due to technical issues on Epic. Thus, we conducted the visit via phone.     Pt reported her bilateral rib pain is persistent, but her pain     4/18/2023:  She reported new bilateral under the rib and lower back pain that has started and worsened over the last two months. She stated she discussed this with Dr. Ba during her appointment on April 13, and a PET scan was  ordered to assess for recurrent or metastatic disease. She denied, fever, chills, sweats, or dyspnea. She stated she has been taking Norco 10 four times a day, and MS ER 15mg Daily at night. She states she does not take MS ER in the morning due to fear of sleeping during the day. Pt also reported diarrhea, which has improved with Lomotil, in addition to increased anxiety and depression due to worsening pain.   I encouraged her to take Norco 10mg QID PRN for pain, and MS ER 15mg BID if her pain continues to worsen, and she is fine with mild sedation. Pt stated she is only comfortable with taking MSER at night. I noted she last filled her Norco   Pt stated she is willing to try an antidepressant for her mood, and she had stopped taking Wellbutrin about a month ago due to palpitations. I informed her that we will complete an EKG to assess her Qtc interval prior to starting an antidepressant. Her last EKG was in May 2021 showing Qtc of 435. Pt agreed to complete EKG.     Past visits:    2/14/2023: Patient comes to clinic with her significant other Alessio. We discussed her recent visits with Dr. Asencio and Deo. She is in good spirits as her Restaging PET scan 1/11/2023 without evidence of recurrent metastatic disease active and no new areas concerning for active disease. She noted she was instructed to continue current treatment for breast cancer and surveillance for cervical cancer. She denies any issues and notes that she continues to be active and her and significant other are going out to eat after visit. She notes that she has intermittent pelvic pain in which she attributes to radiation but notes that current pain medication effective in managing. She notes that it does not happen often. In the setting of stable scans and continued improvement clinically, we dicussed continuing opoid wean. Patient currently taking MSER 15mg po BID and taking 3-4 doses of Norco daily. Will decrease MSER to 15mg po Daily and  continue current hydrocodone regimen not to exceed 4 doses in a day. Will follow up in 4 weeks to continue opoid wean. No new scripts needed as MSER and hydrocodone were just filled on 2/8/23.     12/1/22: Patient comes to clinic with her significant other Alessio. She is still feeling sad because of her grand daughter since she moved out. She noted that she stopped taking her Wellbutrin but wants to start back taking it to help with her mood also to help her with the process of quitting smoking. Also she notes she has been dealing with worsening pain to her left ear. She saw Otolaryngologist recently and he noted no signs of changes on CT and offered patient surgery or non surgical management. Patient notes that she did not want surgery at that time because her doctor made her aware that the procedure may help but there is a possibility that it may not. She notes that the pain and drainage has worsened and she is now thinking about proceeding with surgery even if there is a risk that it will not work. She has been in touch with her Otolaryngologist's office to schedule appointment. She last saw Dr. Asencio 10/13/22 in which there were no changes and she was to continue on current treatment withLetrozole and palbociclib. Her visit with Gyn/onc also noted no new changes and everything was stable. During exam patient reports pelvic pain but notes that medication has been effective in managing it. Being that there were no changes with all of her MD visits, I spoke with patient about continuing plan from her previous PM visit with weaning MSER. She is in agreement, we will wean down to MSER 15mg po BID. I did make patient aware that medications provided by me were not for her ear pain or or acute issues she may experience but it is indicated for malignant pain. I reinforced education about taking medications as prescribed and for what it is indicated for. I made her aware that we could not send in another early  prescription as my counterpart had to do in November. On 11/1/22,  patient sent a message noting she ran out of her medication before scheduled visit. She reported that she was admitted to the [facility] due to abdominal pain and then when she was discharged she cut her foot very badly and had to receive stiches. Due to these issues, she used her medications more frequently than she was prescribed. She was taking 2 tabs of MSER 30mg BID instead of 1 BID. She was taking hydrocodone 4-5x/ day. She admitted that she was not supposed to do this and will not do this again. She was hoping that provider would write the 15mg ER tabs to hold her over. She explained why she would not write for the 15mg and typically do not write early refills. This is the second time that she has taken ER medications more frequently than prescribed. She was counseled gain on the risks with this behavior and this would be her last warning as well as last early refill. Moving forward if she takes opioids other than rx this is grounds for dismissal from our clinic given issues with impulse control and safety. She expressed understanding and said that her  has also scolded her. She knows this was not a good choice and continues to apologize and promised she will not do it again. My partner explained our boundaries in clinic are not punitive but rather in place for her safety. I was present with my counterpart for the conversation and agreed that early refills will be not be permitted moving forward    10/06/2022  She comes to clinic with DEYSI Mccallum today. She is down because her granddaughter is not in a good situation but otherwise feeling okay. Since we last saw each other her pain has improved which is likely related to the positive response to treatment. She would like to begin weaning the MSER back to 30mg BID and hopefully continue to decrease further. She is still taking hydrocodone 10mg QID with good response. Her mood  "improved with the resumption of Wellbutrin and is still cutting back on her tobacco use. She has been having ear pain which is being evaluated by ENT and is waiting to hear when her CT is scheduled. There seems to have been a charting error with her Letrozole resulting in d/c the order and unable to refill. I will reach out to oncology to reorder. We will follow up in 2 months and she will message me for refills whether she would like to continue weaning before then. We do not have HCPOA on file even though she remembers bringing to clinic. She will bring again next visit and confirms that her sister David is HCPOA.    09/01/22: Ms. Flores comes to clinic today with her fidavid Mccallum. She reports that her lower abdominal pain has worsened since brachytherapy started several weeks ago. She reports spontaneous lower abdominal pain not related to food but sometimes exacerbated by BM. She denies constipation. She has taken an extra dose of MSER at bedtime to help with sleep. We talked about the risks associated with this and would advise against doing this again. She is taking hydrocodone q4hr sometimes waking up at night and needing another dose averaging 4-6 in a day. I recommend we increase the long acting dose in hopes for better control and less PRN usage. She says her sleep is poor because this is when the pain intensifies (lying flat) but also anxiety and sadness since her granddaughter moved out of the home. She is taking Xanax HS which helps. She was taking Wellbutrin in the last but not sure why she stopped. For now lets not resume this and evaluate at her next visit.    06/16/2022: Patient doing okay.  No new issues since her last visit.  She continue to have some lower GI pains describes now as "cramping after a BM".  Previously, her discomfort was after eating.  She has been referred to GI.  She has some fatigue but managing.  Pain is controlled on current regimen.  She has upcoming scans and appointment with " Dr. Asencio.  She has some anxiety and life stressors affecting her situation.  Her son and his ex-girfriend (now in [place]) can no longer care for their 4 year old daughter.  She and her partner are assuming the parental rights of her granddaughter.  They seem to be coping as well as can be expected.  Overall, she is doing okay.  We will continue to follow at quarterly intervals.  She knows to reach out if her situation changes or symptom burden worsens.    03/10/2022: Overall, patient seems to be doing okay.  She has some new GI issues with abdominal pain after eating and intermittent diarrhea not felt to be related to her disease or treatment.  Oncology discussed GI referral and she is considering this if no improvement.  We are adjusting her pain medication regimen today mostly to cut back on her prn Norco which she continues to use consistently at 5-6 tablets/day.  She knows she can reach out for additional questions or concerns.     01/06/2022: Patient is a 51 y.o. year old female presenting with for palliative follow up for her metastatic breast cancer, SCC of the cervix. Treatments currently on hold due to recurrent ear infection. She is anxious about results of her recent scan and fearful of disease progression while off treatment.  Otherwise, her pain continues to be controlled on her current regimen.  No new symptoms or complaints today.    ONCOLOGY DIAGNOSES:  Stage IV cervical squamous cell carcinoma   stage IV, T2 N1b M1, invasive breast carcinoma, ER/VT+, HER2-   Papillary thyroid carcinoma status post total thyroidectomy on 08/31/2017, mpT1b N0    Cervical HSIL MIREILLE 3 s/p LEEP, 2017    Patient looks and feels better compared to last visit.  She walked into her appt.  Ear is better and she is being followed by ENT.  She has resumed treatment for both her cervical and breast cancer and is followed closely by medical oncology and gyn oncology.  Pain continues to be controlled on current medication regimen.   She is having knee issues with swelling and likely OA.  We discussed referral to orthopedics.  She also plans to follow up with neurosurgery.  Overall, no major changes and no new issues.     JEANETTE GANNON reviewed and summarized:      OBJECTIVE:     ROS:  Review of Systems   Constitutional:  Positive for activity change and fatigue. Negative for appetite change, chills and fever.   HENT:  Negative for mouth sores, sore throat and trouble swallowing.    Respiratory:  Negative for cough, chest tightness and shortness of breath.    Gastrointestinal:  Positive for nausea. Negative for abdominal distention, abdominal pain, constipation, diarrhea and vomiting.   Genitourinary:  Negative for difficulty urinating and dysuria.   Musculoskeletal:  Negative for back pain and myalgias.   Skin:  Negative for rash and wound.   Allergic/Immunologic: Negative for immunocompromised state.   Neurological:  Negative for weakness and headaches.   Psychiatric/Behavioral:  Positive for agitation, dysphoric mood and sleep disturbance. Negative for confusion and decreased concentration. The patient is nervous/anxious.      Review of Symptoms      Symptom Assessment (ESAS 0-10 Scale)  Pain:  7  Dyspnea:  0  Anxiety:  8  Nausea:  5  Depression:  8  Anorexia:  0  Fatigue:  5  Insomnia:  0  Restlessness:  0  Agitation:  9     CAM / Delirium:  Negative  Constipation:  Negative  Diarrhea:  Positive (Pt uses Lomotil PRN.Improved)    Anxiety:  Is nervous/anxious  Constipation:  No constipation    Bowel Management Plan (BMP):  Yes      Pain Assessment:    Location(s): chest and back (New substernal/rib pain)    Back       Location: lower        Quality: Aching        Quantity: 7/10 in intensity        Chronicity: Onset 2 month(s) ago, gradually worsening        Aggravating Factors: Activity        Alleviating Factors: Opiates       Associated Symptoms: Myalgias  Chest       Location: bilateral        Quality: Sharp        Quantity: 7/10 in intensity         Chronicity: Onset 2 month(s) ago, gradually worsening since Pt reported bilateral under the rib and lower back pain, started two months ago and worsening. She stated Dr. Ba is aware, and has ordered a PET Scan on May 3, 2023 to assess for recurrent or metastatic disease.        Aggravating Factors: Activity        Alleviating Factors: Opiates        Associated Symptoms: Myalgias    Modified Osmar Scale:  0    ECOG Performance Status rdGrdrrdarddrderd:rd rd3rd Living Arrangements:  Lives with spouse    Psychosocial/Cultural:   See Palliative Psychosocial Note: No  Lives with her significant other of 16 years: they have 3 children combined, 2 sons from previous marriage. Granddaughter recently moved out of the home  **Primary  to Follow**  Palliative Care  Consult: No    Advance Care Planning   Advance Directives:   Living Will: Yes        Copy on chart: Yes    LaPOST: Yes    Do Not Resuscitate Status: Yes    Agent's Name:  David Acevedo, sister, [phone]    Decision Making:  Patient answered questions  Goals of Care: What is most important right now is to focus on remaining as independent as possible, symptom/pain control, curative/life-prolongation (regardless of treatment burdens). Accordingly, we have decided that the best plan to meet the patient's goals includes continuing with treatment.      Physical Exam:  Vitals:    Physical Exam  Vitals and nursing note reviewed.   Constitutional:       Appearance: Normal appearance. She is obese. She is ill-appearing (chronic).   HENT:      Head: Normocephalic and atraumatic.   Eyes:      General: No scleral icterus.     Conjunctiva/sclera: Conjunctivae normal.      Pupils: Pupils are equal, round, and reactive to light.   Cardiovascular:      Rate and Rhythm: Normal rate and regular rhythm.      Pulses: Normal pulses.      Heart sounds: Normal heart sounds. No murmur heard.  Pulmonary:      Effort: Pulmonary effort is normal. No respiratory distress.       Breath sounds: Normal breath sounds.   Chest:      Chest wall: No tenderness.   Abdominal:      General: Bowel sounds are normal. There is no distension.      Palpations: Abdomen is soft.      Tenderness: There is no abdominal tenderness.   Musculoskeletal:         General: Tenderness (Lower back) present. Normal range of motion.      Cervical back: Normal range of motion.      Right lower leg: No edema.      Left lower leg: No edema.   Skin:     General: Skin is warm and dry.      Capillary Refill: Capillary refill takes less than 2 seconds.      Coloration: Skin is not jaundiced.      Findings: No rash.   Neurological:      Mental Status: She is alert and oriented to person, place, and time. Mental status is at baseline.      Cranial Nerves: No cranial nerve deficit.   Psychiatric:         Mood and Affect: Mood normal.         Behavior: Behavior normal.         Thought Content: Thought content normal.         Judgment: Judgment normal.       Radiology and laboratory data reviewed    ASSESSMENT/PLAN:     Malignant neoplasm of overlapping sites of right breast in female, estrogen receptor positive  -Followed by Dr. Asencio  -Restaging scan  1/11/2022 without evidence of recurrent metastatic disease active.   -PET scan May 3, 2023 to re-assess etiology of pain.     Malignant neoplasm of endocervix  -Followed by  noted JUAN FRANCISCO, follow up in 3 months   -Restaging scan 1/11/2022 without evidence of recurrent metastatic disease active.  -Holding further maintenance immunotherapy at this time given no evidence of recurrent / progressive metastatic disease    -Surveillance for cervical cancer.    Neoplasm-related pain   -Continue MSER 15mg BID from daily, due to new pain. We will defer opioid weaning due to new rib pain  -Pending PET scan May 03, 2023 to assess for neoplasm recurrence  -Continue hydrocodone 10mg QID PRN not to exceed 4 doses in a day  -No scripts needed at visit as medication was filled on  4/10/2023  UDS repeat at next appointment- May 03, 2023  Narcan rx- Pt stated she knows how to use it      Anxiety/Depression  -Likely worsened by new pain and fear of cancer recurrence  -Continue Xanax 0.5mg-1mg BID PRN  -EKG to assess QTC prior to starting antidepressant    Palliative Care Encounter  -HCPOA:  David Acevedo, sister, [phone]  -Code Status: DNR; LaPOST completed and uploaded in EMR  -[facility]-Continue cancer treatment with surveillance and symptom management.      Follow up: 4 weeks to re-assess pain     40 minutes of total time spent on the encounter, which includes face to face time and non-face to face time preparing to see the patient (eg, review of tests), Obtaining and/or reviewing separately obtained history, Documenting clinical information in the electronic or other health record, Independently interpreting results (not separately reported) and communicating results to the patient/family/caregiver, or Care coordination (not separately reported).    Signature: SUZIE GOTTI NP

## 2023-06-02 ENCOUNTER — HOSPITAL ENCOUNTER (OUTPATIENT)
Facility: HOSPITAL | Age: 53
Discharge: HOME | End: 2023-06-04
Attending: EMERGENCY MEDICINE | Admitting: INTERNAL MEDICINE
Payer: MEDICAID

## 2023-06-02 DIAGNOSIS — G45.9 TIA (TRANSIENT ISCHEMIC ATTACK): ICD-10-CM

## 2023-06-02 DIAGNOSIS — R07.9 CHEST PAIN: ICD-10-CM

## 2023-06-02 DIAGNOSIS — R51.9 NONINTRACTABLE HEADACHE, UNSPECIFIED CHRONICITY PATTERN, UNSPECIFIED HEADACHE TYPE: ICD-10-CM

## 2023-06-02 DIAGNOSIS — I63.9 CEREBROVASCULAR ACCIDENT (CVA), UNSPECIFIED MECHANISM: Primary | ICD-10-CM

## 2023-06-02 LAB
ALBUMIN SERPL BCP-MCNC: 3.9 G/DL (ref 3.5–5.2)
ALP SERPL-CCNC: 74 U/L (ref 55–135)
ALT SERPL W/O P-5'-P-CCNC: 19 U/L (ref 10–44)
ANION GAP SERPL CALC-SCNC: 16 MMOL/L (ref 8–16)
APTT PPP: 25.6 SEC (ref 21–32)
AST SERPL-CCNC: 24 U/L (ref 10–40)
BASOPHILS # BLD AUTO: 0.1 K/UL (ref 0–0.2)
BASOPHILS NFR BLD: 1.2 % (ref 0–1.9)
BILIRUB SERPL-MCNC: 0.4 MG/DL (ref 0.1–1)
BILIRUB UR QL STRIP: NEGATIVE
BNP SERPL-MCNC: <10 PG/ML (ref 0–99)
BUN SERPL-MCNC: 17 MG/DL (ref 6–20)
CALCIUM SERPL-MCNC: 9 MG/DL (ref 8.7–10.5)
CHLORIDE SERPL-SCNC: 100 MMOL/L (ref 95–110)
CLARITY UR: CLEAR
CO2 SERPL-SCNC: 19 MMOL/L (ref 23–29)
COLOR UR: COLORLESS
CREAT SERPL-MCNC: 1.4 MG/DL (ref 0.5–1.4)
DIFFERENTIAL METHOD: ABNORMAL
EOSINOPHIL # BLD AUTO: 0.2 K/UL (ref 0–0.5)
EOSINOPHIL NFR BLD: 2 % (ref 0–8)
ERYTHROCYTE [DISTWIDTH] IN BLOOD BY AUTOMATED COUNT: 13.9 % (ref 11.5–14.5)
EST. GFR  (NO RACE VARIABLE): 45 ML/MIN/1.73 M^2
GLUCOSE SERPL-MCNC: 117 MG/DL (ref 70–110)
GLUCOSE UR QL STRIP: NEGATIVE
HCT VFR BLD AUTO: 33.3 % (ref 37–48.5)
HGB BLD-MCNC: 11.9 G/DL (ref 12–16)
HGB UR QL STRIP: ABNORMAL
IMM GRANULOCYTES # BLD AUTO: 0.28 K/UL (ref 0–0.04)
IMM GRANULOCYTES NFR BLD AUTO: 3.2 % (ref 0–0.5)
INR PPP: 1 (ref 0.8–1.2)
KETONES UR QL STRIP: NEGATIVE
LEUKOCYTE ESTERASE UR QL STRIP: NEGATIVE
LYMPHOCYTES # BLD AUTO: 1.7 K/UL (ref 1–4.8)
LYMPHOCYTES NFR BLD: 19.1 % (ref 18–48)
MCH RBC QN AUTO: 38.8 PG (ref 27–31)
MCHC RBC AUTO-ENTMCNC: 35.7 G/DL (ref 32–36)
MCV RBC AUTO: 109 FL (ref 82–98)
MICROSCOPIC COMMENT: NORMAL
MONOCYTES # BLD AUTO: 0.8 K/UL (ref 0.3–1)
MONOCYTES NFR BLD: 9.4 % (ref 4–15)
NEUTROPHILS # BLD AUTO: 5.6 K/UL (ref 1.8–7.7)
NEUTROPHILS NFR BLD: 65.1 % (ref 38–73)
NITRITE UR QL STRIP: NEGATIVE
NRBC BLD-RTO: 0 /100 WBC
PH UR STRIP: 5 [PH] (ref 5–8)
PLATELET # BLD AUTO: 216 K/UL (ref 150–450)
PMV BLD AUTO: 11.4 FL (ref 9.2–12.9)
POTASSIUM SERPL-SCNC: 3.8 MMOL/L (ref 3.5–5.1)
PROT SERPL-MCNC: 7.3 G/DL (ref 6–8.4)
PROT UR QL STRIP: NEGATIVE
PROTHROMBIN TIME: 10.4 SEC (ref 9–12.5)
RBC # BLD AUTO: 3.07 M/UL (ref 4–5.4)
RBC #/AREA URNS HPF: 0 /HPF (ref 0–4)
SODIUM SERPL-SCNC: 135 MMOL/L (ref 136–145)
SP GR UR STRIP: 1 (ref 1–1.03)
TROPONIN I SERPL DL<=0.01 NG/ML-MCNC: 0.01 NG/ML (ref 0–0.03)
TROPONIN I SERPL DL<=0.01 NG/ML-MCNC: 0.01 NG/ML (ref 0–0.03)
URN SPEC COLLECT METH UR: ABNORMAL
UROBILINOGEN UR STRIP-ACNC: NEGATIVE EU/DL
WBC # BLD AUTO: 8.65 K/UL (ref 3.9–12.7)

## 2023-06-02 PROCEDURE — 85730 THROMBOPLASTIN TIME PARTIAL: CPT | Performed by: PHYSICIAN ASSISTANT

## 2023-06-02 PROCEDURE — 84484 ASSAY OF TROPONIN QUANT: CPT | Performed by: PHYSICIAN ASSISTANT

## 2023-06-02 PROCEDURE — 84484 ASSAY OF TROPONIN QUANT: CPT | Mod: 91 | Performed by: EMERGENCY MEDICINE

## 2023-06-02 PROCEDURE — G0378 HOSPITAL OBSERVATION PER HR: HCPCS

## 2023-06-02 PROCEDURE — 93010 ELECTROCARDIOGRAM REPORT: CPT | Mod: ,,, | Performed by: INTERNAL MEDICINE

## 2023-06-02 PROCEDURE — 85025 COMPLETE CBC W/AUTO DIFF WBC: CPT | Performed by: PHYSICIAN ASSISTANT

## 2023-06-02 PROCEDURE — 25000003 PHARM REV CODE 250: Performed by: EMERGENCY MEDICINE

## 2023-06-02 PROCEDURE — 99285 EMERGENCY DEPT VISIT HI MDM: CPT | Mod: 25

## 2023-06-02 PROCEDURE — 81000 URINALYSIS NONAUTO W/SCOPE: CPT | Performed by: INTERNAL MEDICINE

## 2023-06-02 PROCEDURE — 93010 EKG 12-LEAD: ICD-10-PCS | Mod: ,,, | Performed by: INTERNAL MEDICINE

## 2023-06-02 PROCEDURE — 25000003 PHARM REV CODE 250: Performed by: NURSE PRACTITIONER

## 2023-06-02 PROCEDURE — 85610 PROTHROMBIN TIME: CPT | Performed by: PHYSICIAN ASSISTANT

## 2023-06-02 PROCEDURE — 93005 ELECTROCARDIOGRAM TRACING: CPT

## 2023-06-02 PROCEDURE — 83880 ASSAY OF NATRIURETIC PEPTIDE: CPT | Performed by: PHYSICIAN ASSISTANT

## 2023-06-02 PROCEDURE — 80053 COMPREHEN METABOLIC PANEL: CPT | Performed by: PHYSICIAN ASSISTANT

## 2023-06-02 RX ORDER — ACETAMINOPHEN 325 MG/1
650 TABLET ORAL EVERY 6 HOURS PRN
Status: DISCONTINUED | OUTPATIENT
Start: 2023-06-02 | End: 2023-06-04 | Stop reason: HOSPADM

## 2023-06-02 RX ORDER — MORPHINE SULFATE 4 MG/ML
4 INJECTION, SOLUTION INTRAMUSCULAR; INTRAVENOUS EVERY 4 HOURS PRN
Status: DISCONTINUED | OUTPATIENT
Start: 2023-06-03 | End: 2023-06-04 | Stop reason: HOSPADM

## 2023-06-02 RX ORDER — ACETAMINOPHEN 325 MG/1
650 TABLET ORAL
Status: DISCONTINUED | OUTPATIENT
Start: 2023-06-02 | End: 2023-06-02

## 2023-06-02 RX ORDER — ATORVASTATIN CALCIUM 40 MG/1
40 TABLET, FILM COATED ORAL DAILY
Status: DISCONTINUED | OUTPATIENT
Start: 2023-06-03 | End: 2023-06-04 | Stop reason: HOSPADM

## 2023-06-02 RX ORDER — CLOPIDOGREL BISULFATE 75 MG/1
75 TABLET ORAL ONCE
Status: COMPLETED | OUTPATIENT
Start: 2023-06-02 | End: 2023-06-02

## 2023-06-02 RX ORDER — MORPHINE SULFATE 2 MG/ML
2 INJECTION, SOLUTION INTRAMUSCULAR; INTRAVENOUS EVERY 4 HOURS PRN
Status: DISCONTINUED | OUTPATIENT
Start: 2023-06-03 | End: 2023-06-04 | Stop reason: HOSPADM

## 2023-06-02 RX ORDER — ENOXAPARIN SODIUM 100 MG/ML
40 INJECTION SUBCUTANEOUS EVERY 24 HOURS
Status: DISCONTINUED | OUTPATIENT
Start: 2023-06-03 | End: 2023-06-04 | Stop reason: HOSPADM

## 2023-06-02 RX ORDER — IBUPROFEN 200 MG
1 TABLET ORAL DAILY
Status: DISCONTINUED | OUTPATIENT
Start: 2023-06-03 | End: 2023-06-04 | Stop reason: HOSPADM

## 2023-06-02 RX ORDER — BUTALBITAL, ACETAMINOPHEN AND CAFFEINE 50; 325; 40 MG/1; MG/1; MG/1
1 TABLET ORAL EVERY 6 HOURS PRN
Status: DISCONTINUED | OUTPATIENT
Start: 2023-06-02 | End: 2023-06-04 | Stop reason: HOSPADM

## 2023-06-02 RX ORDER — ONDANSETRON 2 MG/ML
4 INJECTION INTRAMUSCULAR; INTRAVENOUS EVERY 8 HOURS PRN
Status: DISCONTINUED | OUTPATIENT
Start: 2023-06-02 | End: 2023-06-04 | Stop reason: HOSPADM

## 2023-06-02 RX ORDER — BUTALBITAL, ACETAMINOPHEN AND CAFFEINE 50; 325; 40 MG/1; MG/1; MG/1
1 TABLET ORAL
Status: COMPLETED | OUTPATIENT
Start: 2023-06-02 | End: 2023-06-02

## 2023-06-02 RX ORDER — SODIUM CHLORIDE 0.9 % (FLUSH) 0.9 %
10 SYRINGE (ML) INJECTION
Status: DISCONTINUED | OUTPATIENT
Start: 2023-06-02 | End: 2023-06-04 | Stop reason: HOSPADM

## 2023-06-02 RX ORDER — CLOPIDOGREL BISULFATE 75 MG/1
75 TABLET ORAL DAILY
Status: DISCONTINUED | OUTPATIENT
Start: 2023-06-03 | End: 2023-06-04 | Stop reason: HOSPADM

## 2023-06-02 RX ADMIN — BUTALBITAL, ACETAMINOPHEN, AND CAFFEINE 1 TABLET: 325; 50; 40 TABLET ORAL at 04:06

## 2023-06-02 RX ADMIN — CLOPIDOGREL BISULFATE 75 MG: 75 TABLET ORAL at 07:06

## 2023-06-02 NOTE — ED PROVIDER NOTES
SCRIBE #1 NOTE: I, Me-Daniel Julien, am scribing for, and in the presence of, Stephanie Dockery DO. I have scribed the entire note.       History     Chief Complaint   Patient presents with    Headache    Numbness     Pt started with HA and numbness on the L side, also having some chest pain, all of this started Tuesday, pt having some nausea no vomiting, also having some sob     Review of patient's allergies indicates:   Allergen Reactions    Gabapentin Swelling     Note: unilateral joint edema, unclear if due to gabapentin    Nsaids (non-steroidal anti-inflammatory drug) Other (See Comments)     Instructed not to take NSAID drugs with chemo pill.    Clindamycin Rash    Vancomycin analogues Itching         History of Present Illness     HPI    6/2/2023, 6:27 PM  History obtained from the patient      History of Present Illness: Josey Flores is a 52 y.o. female patient with a PMHx of asthma, metastatic BRCA, COPD, tobacco use, hypothyroidism, and thyroid cancer who presents to the Emergency Department for evaluation of L-sided numbness which onset 3 days ago. Pt states that she can't hold anything on her L hand and has fallen multiple times. Pt has never experienced this before. Pt reports elevated blood pressure for the past couple days. Pt also reports intermittent R-sided CP. Symptoms are moderate in severity. No mitigating or exacerbating factors reported. Associated sxs include R-sided CP, palpitations, headaches, light-headedness, and dizziness. Patient denies any syncope, L-sided weakness and numbness, vomiting, diarrhea, SOB, leg swelling, and all other sxs at this time.  Patient states her headache was not sudden in maximal at onset and progressively worsened.  Prior Tx includes tylenol and morphine which only offered slight temporary relief. Pt denies use of blood thinners. Pt smokes. No further complaints or concerns at this time.       Arrival mode: Personal vehicle    PCP: Kishore Persaud MD         Past Medical History:  Past Medical History:   Diagnosis Date    Anxiety     Asthma     Breast cancer 2017    Cancer     right breast, metastatic-lymph nodes, bone, and thyroid     COPD (chronic obstructive pulmonary disease)     Depression     History of psychiatric hospitalization     2000; suicidal ideation    Hx of psychiatric care     Hypothyroidism     Miscarriage     five miscarriages    Obesity     Psychiatric problem     Thyroid cancer 2017       Past Surgical History:  Past Surgical History:   Procedure Laterality Date    ABSCESS DRAINAGE      bilateral axilla    ADENOIDECTOMY      APPENDECTOMY      BREAST BIOPSY Right     x3    CHOLECYSTECTOMY      ENDOBRONCHIAL ULTRASOUND Bilateral 02/18/2021    Procedure: ENDOBRONCHIAL ULTRASOUND (EBUS);  Surgeon: Jaron Ni MD;  Location: [facility] ENDO;  Service: Pulmonary;  Laterality: Bilateral;    FLUOROSCOPY N/A 01/28/2021    Procedure: Mediport placement;  Surgeon: Noah Phelan MD;  Location: [facility] CATH LAB;  Service: General;  Laterality: N/A;    MASS EXCISION      MEDIPORT INSERTION, SINGLE Right 02/2021    SKIN GRAFT  06/16/2017    THYROIDECTOMY Bilateral     TONSILLECTOMY           Family History:  Family History   Problem Relation Age of Onset    Arthritis Mother     Early death Mother         64 at time of death    Heart attack Mother     Heart disease Mother     Heart failure Mother     Hypertension Mother     Coronary artery disease Father     Hearing loss Father     Heart disease Father     Hyperlipidemia Father     Hypertension Father     Mental illness Father     Learning disabilities Son     Cancer Maternal Aunt        Social History:  Social History     Tobacco Use    Smoking status: Every Day     Packs/day: 1.00     Years: 20.00     Pack years: 20.00     Types: Cigarettes     Start date: 1/1/1985    Smokeless tobacco: Never    Tobacco comments:     45 pack year history   Substance and Sexual Activity    Alcohol use: No    Drug use: No     Sexual activity: Not Currently     Partners: Male     Birth control/protection: None        Review of Systems     Review of Systems   Respiratory:  Negative for shortness of breath.    Cardiovascular:  Positive for chest pain (R-sided) and palpitations. Negative for leg swelling.   Gastrointestinal:  Positive for nausea. Negative for diarrhea and vomiting.   Neurological:  Positive for dizziness, weakness (L-sided), light-headedness, numbness (L-sided) and headaches. Negative for syncope.      Physical Exam     Initial Vitals [06/02/23 1448]   BP Pulse Resp Temp SpO2   118/60 90 18 97.4 °F (36.3 °C) 97 %      MAP       --          Physical Exam  Nursing Notes and Vital Signs Reviewed.  Constitutional: Patient is in no acute distress. Well-developed and well-nourished.  Head: Atraumatic. Normocephalic.  Eyes: PERRL. EOM intact. Conjunctivae are not pale. No scleral icterus.  ENT: Mucous membranes are moist.  Edentulous.   Neck: Supple. Full ROM. No meningeal signs.  Cardiovascular: Regular rate. Regular rhythm. No murmurs, rubs, or gallops. Distal pulses are 2+ and symmetric.  Pulmonary/Chest: No respiratory distress.  Diminished lung sounds with prolonged expiratory phase.  No wheezing or rales.  Abdominal: Soft and non-distended.  There is no tenderness.  No rebound, guarding, or rigidity. Good bowel sounds.  Genitourinary: No CVA tenderness  Musculoskeletal: Moves all extremities. No obvious deformities. No edema. No calf tenderness.  Skin: Warm and dry.  Neurological:  Alert, awake, and appropriate.  Normal speech.   Psychiatric: Normal affect. Good eye contact. Appropriate in content.     ED Course   Critical Care    Date/Time: 6/2/2023 7:20 PM  Performed by: Stephanie Dockery DO  Authorized by: Stephanie Dockery DO   Direct patient critical care time: 12 minutes  Additional history critical care time: 6 minutes  Ordering / reviewing critical care time: 4 minutes  Documentation critical care time: 9  "minutes  Consulting other physicians critical care time: 5 minutes  Total critical care time (exclusive of procedural time) : 36 minutes  Critical care time was exclusive of separately billable procedures and treating other patients and teaching time.  Critical care was necessary to treat or prevent imminent or life-threatening deterioration of the following conditions: CNS failure or compromise.  Critical care was time spent personally by me on the following activities: development of treatment plan with patient or surrogate, discussions with consultants, examination of patient, obtaining history from patient or surrogate, ordering and performing treatments and interventions, ordering and review of laboratory studies, ordering and review of radiographic studies, pulse oximetry and re-evaluation of patient's condition.      ED Vital Signs:  Vitals:    06/02/23 1448 06/02/23 1830 06/02/23 1834 06/02/23 1838   BP: 118/60 (!) 112/59  (!) 112/59   Pulse: 90 73 71 72   Resp: 18 16  17   Temp: 97.4 °F (36.3 °C) 98.4 °F (36.9 °C)     TempSrc: Oral      SpO2: 97% 97%  98%   Weight: 97.3 kg (214 lb 6.4 oz)      Height: 5' 7" (1.702 m)       06/02/23 1848   BP: (!) 113/58   Pulse: 85   Resp:    Temp:    TempSrc:    SpO2: 97%   Weight:    Height:        Abnormal Lab Results:  Labs Reviewed   CBC W/ AUTO DIFFERENTIAL - Abnormal; Notable for the following components:       Result Value    RBC 3.07 (*)     Hemoglobin 11.9 (*)     Hematocrit 33.3 (*)      (*)     MCH 38.8 (*)     Immature Granulocytes 3.2 (*)     Immature Grans (Abs) 0.28 (*)     All other components within normal limits   COMPREHENSIVE METABOLIC PANEL - Abnormal; Notable for the following components:    Sodium 135 (*)     CO2 19 (*)     Glucose 117 (*)     eGFR 45 (*)     All other components within normal limits   TROPONIN I   B-TYPE NATRIURETIC PEPTIDE   APTT   PROTIME-INR   TROPONIN I        All Lab Results:  Results for orders placed or performed " during the hospital encounter of 06/02/23   CBC auto differential   Result Value Ref Range    WBC 8.65 3.90 - 12.70 K/uL    RBC 3.07 (L) 4.00 - 5.40 M/uL    Hemoglobin 11.9 (L) 12.0 - 16.0 g/dL    Hematocrit 33.3 (L) 37.0 - 48.5 %     (H) 82 - 98 fL    MCH 38.8 (H) 27.0 - 31.0 pg    MCHC 35.7 32.0 - 36.0 g/dL    RDW 13.9 11.5 - 14.5 %    Platelets 216 150 - 450 K/uL    MPV 11.4 9.2 - 12.9 fL    Immature Granulocytes 3.2 (H) 0.0 - 0.5 %    Gran # (ANC) 5.6 1.8 - 7.7 K/uL    Immature Grans (Abs) 0.28 (H) 0.00 - 0.04 K/uL    Lymph # 1.7 1.0 - 4.8 K/uL    Mono # 0.8 0.3 - 1.0 K/uL    Eos # 0.2 0.0 - 0.5 K/uL    Baso # 0.10 0.00 - 0.20 K/uL    nRBC 0 0 /100 WBC    Gran % 65.1 38.0 - 73.0 %    Lymph % 19.1 18.0 - 48.0 %    Mono % 9.4 4.0 - 15.0 %    Eosinophil % 2.0 0.0 - 8.0 %    Basophil % 1.2 0.0 - 1.9 %    Differential Method Automated    Comprehensive metabolic panel   Result Value Ref Range    Sodium 135 (L) 136 - 145 mmol/L    Potassium 3.8 3.5 - 5.1 mmol/L    Chloride 100 95 - 110 mmol/L    CO2 19 (L) 23 - 29 mmol/L    Glucose 117 (H) 70 - 110 mg/dL    BUN 17 6 - 20 mg/dL    Creatinine 1.4 0.5 - 1.4 mg/dL    Calcium 9.0 8.7 - 10.5 mg/dL    Total Protein 7.3 6.0 - 8.4 g/dL    Albumin 3.9 3.5 - 5.2 g/dL    Total Bilirubin 0.4 0.1 - 1.0 mg/dL    Alkaline Phosphatase 74 55 - 135 U/L    AST 24 10 - 40 U/L    ALT 19 10 - 44 U/L    Anion Gap 16 8 - 16 mmol/L    eGFR 45 (A) >60 mL/min/1.73 m^2   Troponin I #1   Result Value Ref Range    Troponin I 0.008 0.000 - 0.026 ng/mL   BNP   Result Value Ref Range    BNP <10 0 - 99 pg/mL   APTT   Result Value Ref Range    aPTT 25.6 21.0 - 32.0 sec   Protime-INR   Result Value Ref Range    Prothrombin Time 10.4 9.0 - 12.5 sec    INR 1.0 0.8 - 1.2     *Note: Due to a large number of results and/or encounters for the requested time period, some results have not been displayed. A complete set of results can be found in Results Review.        Imaging Results:  Imaging Results               X-Ray Chest AP Portable (Final result)  Result time 06/02/23 17:04:19      Final result by Raphael Morris MD (06/02/23 17:04:19)                   Impression:      No acute abnormality.      Electronically signed by: Raphael Morris  Date:    06/02/2023  Time:    17:04               Narrative:    EXAMINATION:  XR CHEST AP PORTABLE    CLINICAL HISTORY:  Chest Pain;    TECHNIQUE:  Single frontal view of the chest was performed.    COMPARISON:  Multiple priors.    FINDINGS:  The lungs are clear, with normal appearance of pulmonary vasculature and no pleural effusion or pneumothorax.    The cardiac silhouette is normal in size. The hilar and mediastinal contours are unremarkable.    Bones are intact.                                       CT Head Without Contrast (Final result)  Result time 06/02/23 16:21:58      Final result by Hali Sadler MD (06/02/23 16:21:58)                   Impression:      No overt acute intracranial finding      Electronically signed by: Hali Sadler  Date:    06/02/2023  Time:    16:21               Narrative:    EXAMINATION:  CT HEAD WITHOUT CONTRAST    CLINICAL HISTORY:  Headache, new or worsening (Age >= 50y);    TECHNIQUE:  Low dose axial images were obtained through the head.  Coronal and sagittal reformations were also performed. Contrast was not administered.    COMPARISON:  2022 October    FINDINGS:  No acute intracranial hemorrhage or mass effect.  Chronic microangiopathic change or demyelination likely.  Ventricles and basal cisterns maintain.  No displaced bony finding.  Visualized sinuses and mastoids well aerated.                                       The EKG was ordered, reviewed, and independently interpreted by the ED provider.  Interpretation time: 14:52  Rate: 91 BPM  Rhythm: normal sinus rhythm  Interpretation: Nonspecific T wave abnormality. No STEMI.         The Emergency Provider reviewed the vital signs and test results, which are outlined  above.     ED Discussion     7:22 PM: Discussed case with Yo Huntley MD ([facility]). Dr. Huntley agrees with current care and management of pt and accepts admission.   Admitting Service: Hospital Medicine  Admitting Physician: Dr. Huntley  Admit to: Obs Tele    7:26 PM: Re-evaluated pt. I have discussed test results, shared treatment plan, and the need for admission with patient. Pt express understanding at this time and agree with all information. All questions answered. Pt has no further questions or concerns at this time. Pt is ready for admit.      ED Course as of 06/02/23 1937 Fri Jun 02, 2023 1904 52-year-old female with a history of tobacco use and COPD presents with headache, left-sided weakness and numbness, and chest pain.  Last known well 3 days ago.  Patient states her symptoms started on Tuesday morning.  She is outside of the tPA window.  She is also outside of the thrombectomy window.  She is van negative.  Blood pressure 113/58 and glucose 117.  CT head negative for intracranial hemorrhage.  NIH stroke scale 4.  Patient states her headache was not sudden or maximal in onset therefore subarachnoid hemorrhage unlikely.  Patient has no meningeal signs on exam with no fever or leukocytosis therefore meningitis unlikely.  Eye exam normal therefore acute glaucoma unlikely.  EKG shows nonspecific T-wave flattening.  Troponin negative.  Chest x-ray normal.  Patient has a heart score of 3 therefore ACS could be ruled out by another negative troponin which is currently pending.  Patient has no signs of prolonged bleeding times.  CBC shows baseline H&H.  CMP unremarkable.  Chest x-ray shows no pneumothorax, free air to indicate ruptured esophagus, wide mediastinum to indicate thoracic aortic dissection or pneumothorax.  No ASA due to NSAID allergy. Will dose with Plavix.  Patient does have a history of malignancy therefore we can not rule out PE using PERC or Wells criteria however she is  not hypoxic or tachycardic.  [NF]      ED Course User Index  [NF] Stephanie Dockery DO     Medical Decision Making:   Clinical Tests:   Lab Tests: Ordered and Reviewed  Radiological Study: Ordered and Reviewed  Medical Tests: Ordered and Reviewed   Additional MDM:   Heart Score:    History:          Slightly suspicious.  ECG:             Nonspecific repolarisation disturbance  Age:               45-65 years  Risk factors: 1-2 risk factors  Troponin:       Less than or equal to normal limit  Final Score: 3      NIH Stroke Scale:   Level of consciousness = 0 - alert  LOC questions = 0 - answers both correctly  LOC commands = 0 - performs both correctly  Best gaze = 0 - normal  Visual = 0 - no visual loss  Facial palsy = 0 - normal  Motor left arm =  1 - drift  Motor right arm =  0 - no drift  Motor left leg = 1 - drift  Motor right leg =  0 - no drift  Limb ataxia = 1 - present in one limb  Sensory = 1 - mild to moderate loss  Best language = 0 - no aphasia  Dysarthria = 0 - normal articulation  Extinction and inattention = 0 - no neglect  NIH Stroke Scale Total = 4     ED Medication(s):  Medications   butalbital-acetaminophen-caffeine -40 mg per tablet 1 tablet (1 tablet Oral Given 6/2/23 1623)   clopidogreL tablet 75 mg (75 mg Oral Given 6/2/23 1924)       New Prescriptions    No medications on file           Scribe Attestation:   Scribe #1: I performed the above scribed service and the documentation accurately describes the services I performed. I attest to the accuracy of the note.     Attending:   Physician Attestation Statement for Scribe #1: I, Stephanie Dockery DO, personally performed the services described in this documentation, as scribed by Ovidio Julien, in my presence, and it is both accurate and complete.           Clinical Impression       ICD-10-CM ICD-9-CM   1. Cerebrovascular accident (CVA), unspecified mechanism  I63.9 434.91   2. Chest pain  R07.9 786.50   3. Nonintractable headache,  unspecified chronicity pattern, unspecified headache type  R51.9 784.0       Disposition:   Disposition: Placed in Observation  Condition: Fiordaliza Dockery DO  06/02/23 1939

## 2023-06-02 NOTE — FIRST PROVIDER EVALUATION
Emergency Department TeleTriage Encounter Note      CHIEF COMPLAINT    Chief Complaint   Patient presents with    Headache    Numbness     Pt started with HA and numbness on the L side, also having some chest pain, all of this started Tuesday, pt having some nausea no vomiting, also having some sob       VITAL SIGNS   Initial Vitals [06/02/23 1448]   BP Pulse Resp Temp SpO2   118/60 90 18 97.4 °F (36.3 °C) 97 %      MAP       --            ALLERGIES    Review of patient's allergies indicates:   Allergen Reactions    Gabapentin Swelling     Note: unilateral joint edema, unclear if due to gabapentin    Nsaids (non-steroidal anti-inflammatory drug) Other (See Comments)     Instructed not to take NSAID drugs with chemo pill.    Clindamycin Rash    Vancomycin analogues Itching       PROVIDER TRIAGE NOTE  This is a teletriage evaluation of a 52 y.o. female presenting to the ED complaining of headache. Patient reports headache, left arm numbness and weakness, chest pain, and nausea for 3 days.  reports frequent dropping of items in her left hand. She also had slurred speech this morning. Blood pressure has been high at home.    Patient is alert and oriented. She speaks in complete sentences. She is sitting upright in the chair in no distress.     Initial orders will be placed and care will be transferred to an alternate provider when patient is roomed for a full evaluation. Any additional orders and the final disposition will be determined by that provider.         ORDERS  Labs Reviewed   CBC W/ AUTO DIFFERENTIAL   COMPREHENSIVE METABOLIC PANEL   TROPONIN I   B-TYPE NATRIURETIC PEPTIDE       ED Orders (720h ago, onward)      Start Ordered     Status Ordering Provider    06/02/23 1527 06/02/23 1526  APTT  STAT         Ordered TRAY SOLIZ    06/02/23 1527 06/02/23 1526  Protime-INR  STAT         Ordered TRAY SOLIZ    06/02/23 1521 06/02/23 1521  Vital signs  Every 15 min         Ordered TRAY SOLIZ     06/02/23 1521 06/02/23 1521  Cardiac Monitoring - Adult  Continuous        Comments: Notify Physician If:    Ordered TRAY SOLIZ.    06/02/23 1521 06/02/23 1521  Pulse Oximetry Continuous  Continuous         Ordered TRAY SOLIZ.    06/02/23 1521 06/02/23 1521  Diet NPO  Diet effective now         Ordered TRAY SOLIZ.    06/02/23 1521 06/02/23 1521  Saline lock IV  Once         Ordered TRAY SOLIZ.    06/02/23 1521 06/02/23 1521  EKG 12-lead  Once        Comments: Do not perform if previously done during this visit/ triage    Ordered TRAY SOLIZ.    06/02/23 1521 06/02/23 1521  CBC auto differential  STAT         Ordered TRAY SOLIZ.    06/02/23 1521 06/02/23 1521  Comprehensive metabolic panel  STAT         Ordered TRAY SOLIZ.    06/02/23 1521 06/02/23 1521  Troponin I #1  STAT         Ordered TRAY SOLIZ.    06/02/23 1521 06/02/23 1521  BNP  STAT         Ordered TRAY SOLIZ.    06/02/23 1521 06/02/23 1521  X-Ray Chest AP Portable  1 time imaging         Ordered TRAY SOLIZ.    06/02/23 1521 06/02/23 1521  CT Head Without Contrast  1 time imaging         Ordered TRAY SOLIZ.              Virtual Visit Note: The provider triage portion of this emergency department evaluation and documentation was performed via [employer], a HIPAA-compliant telemedicine application, in concert with a tele-presenter in the room. A face to face patient evaluation with one of my colleagues will occur once the patient is placed in an emergency department room.      DISCLAIMER: This note was prepared with [employer] voice recognition transcription software. Garbled syntax, mangled pronouns, and other bizarre constructions may be attributed to that software system.

## 2023-06-02 NOTE — FIRST PROVIDER EVALUATION
"Medical screening examination initiated.  I have conducted a focused provider triage encounter, findings are as follows:    Brief history of present illness: Reports headache, left arm numbness and weakness, chest pain and nausea over the past 3 days.     Vitals:    06/02/23 1448   BP: 118/60   BP Location: Right arm   Patient Position: Sitting   Pulse: 90   Resp: 18   Temp: 97.4 °F (36.3 °C)   TempSrc: Oral   SpO2: 97%   Weight: 97.3 kg (214 lb 6.4 oz)   Height: 5' 7" (1.702 m)       Pertinent physical exam:  AAOX3, no slurred speech, mild weakness left upper arm  "

## 2023-06-02 NOTE — ED NOTES
Port positional for blood draw. Unable to draw more blood for PT-INR and aPTT labs. Will attempt again when patient is in bed.

## 2023-06-03 LAB
ALBUMIN SERPL BCP-MCNC: 3.6 G/DL (ref 3.5–5.2)
ALP SERPL-CCNC: 68 U/L (ref 55–135)
ALT SERPL W/O P-5'-P-CCNC: 18 U/L (ref 10–44)
ANION GAP SERPL CALC-SCNC: 14 MMOL/L (ref 8–16)
AORTIC ROOT ANNULUS: 3.71 CM
APTT PPP: 28 SEC (ref 21–32)
ASCENDING AORTA: 2.93 CM
AST SERPL-CCNC: 15 U/L (ref 10–40)
AV INDEX (PROSTH): 0.72
AV MEAN GRADIENT: 2 MMHG
AV PEAK GRADIENT: 4 MMHG
AV VALVE AREA: 2.41 CM2
AV VELOCITY RATIO: 0.74
BASOPHILS # BLD AUTO: 0.09 K/UL (ref 0–0.2)
BASOPHILS NFR BLD: 1.2 % (ref 0–1.9)
BILIRUB SERPL-MCNC: 0.3 MG/DL (ref 0.1–1)
BSA FOR ECHO PROCEDURE: 2.14 M2
BUN SERPL-MCNC: 21 MG/DL (ref 6–20)
CALCIUM SERPL-MCNC: 8.9 MG/DL (ref 8.7–10.5)
CHLORIDE SERPL-SCNC: 103 MMOL/L (ref 95–110)
CHOLEST SERPL-MCNC: 204 MG/DL (ref 120–199)
CHOLEST/HDLC SERPL: 6.2 {RATIO} (ref 2–5)
CO2 SERPL-SCNC: 21 MMOL/L (ref 23–29)
CREAT SERPL-MCNC: 1.3 MG/DL (ref 0.5–1.4)
CV ECHO LV RWT: 0.71 CM
DIFFERENTIAL METHOD: ABNORMAL
DOP CALC AO PEAK VEL: 0.97 M/S
DOP CALC AO VTI: 21.8 CM
DOP CALC LVOT AREA: 3.4 CM2
DOP CALC LVOT DIAMETER: 2.07 CM
DOP CALC LVOT PEAK VEL: 0.72 M/S
DOP CALC LVOT STROKE VOLUME: 52.47 CM3
DOP CALC RVOT PEAK VEL: 0.54 M/S
DOP CALC RVOT VTI: 12.7 CM
DOP CALCLVOT PEAK VEL VTI: 15.6 CM
E WAVE DECELERATION TIME: 157.96 MSEC
E/A RATIO: 1.02
E/E' RATIO: 9.89 M/S
ECHO LV POSTERIOR WALL: 1.51 CM (ref 0.6–1.1)
EJECTION FRACTION: 60 %
EOSINOPHIL # BLD AUTO: 0.2 K/UL (ref 0–0.5)
EOSINOPHIL NFR BLD: 2.9 % (ref 0–8)
ERYTHROCYTE [DISTWIDTH] IN BLOOD BY AUTOMATED COUNT: 13.9 % (ref 11.5–14.5)
EST. GFR  (NO RACE VARIABLE): 49 ML/MIN/1.73 M^2
ESTIMATED AVG GLUCOSE: 100 MG/DL (ref 68–131)
FRACTIONAL SHORTENING: 26 % (ref 28–44)
GLUCOSE SERPL-MCNC: 116 MG/DL (ref 70–110)
HBA1C MFR BLD: 5.1 % (ref 4–5.6)
HCT VFR BLD AUTO: 31.6 % (ref 37–48.5)
HDLC SERPL-MCNC: 33 MG/DL (ref 40–75)
HDLC SERPL: 16.2 % (ref 20–50)
HGB BLD-MCNC: 11 G/DL (ref 12–16)
IMM GRANULOCYTES # BLD AUTO: 0.24 K/UL (ref 0–0.04)
IMM GRANULOCYTES NFR BLD AUTO: 3.3 % (ref 0–0.5)
INR PPP: 1.1 (ref 0.8–1.2)
INTERVENTRICULAR SEPTUM: 1.68 CM (ref 0.6–1.1)
IVRT: 85.63 MSEC
LA MAJOR: 5.16 CM
LA MINOR: 5.46 CM
LA WIDTH: 2.4 CM
LDLC SERPL CALC-MCNC: 131.2 MG/DL (ref 63–159)
LEFT ATRIUM SIZE: 3.42 CM
LEFT ATRIUM VOLUME INDEX MOD: 14.2 ML/M2
LEFT ATRIUM VOLUME INDEX: 17.8 ML/M2
LEFT ATRIUM VOLUME MOD: 29.5 CM3
LEFT ATRIUM VOLUME: 37.02 CM3
LEFT INTERNAL DIMENSION IN SYSTOLE: 3.11 CM (ref 2.1–4)
LEFT VENTRICLE DIASTOLIC VOLUME INDEX: 38.31 ML/M2
LEFT VENTRICLE DIASTOLIC VOLUME: 79.69 ML
LEFT VENTRICLE MASS INDEX: 133 G/M2
LEFT VENTRICLE SYSTOLIC VOLUME INDEX: 18.4 ML/M2
LEFT VENTRICLE SYSTOLIC VOLUME: 38.19 ML
LEFT VENTRICULAR INTERNAL DIMENSION IN DIASTOLE: 4.23 CM (ref 3.5–6)
LEFT VENTRICULAR MASS: 277.52 G
LV LATERAL E/E' RATIO: 8.09 M/S
LV SEPTAL E/E' RATIO: 12.71 M/S
LVOT MG: 1 MMHG
LVOT MV: 0.47 CM/S
LYMPHOCYTES # BLD AUTO: 1.9 K/UL (ref 1–4.8)
LYMPHOCYTES NFR BLD: 25.5 % (ref 18–48)
MAGNESIUM SERPL-MCNC: 1.7 MG/DL (ref 1.6–2.6)
MCH RBC QN AUTO: 38.5 PG (ref 27–31)
MCHC RBC AUTO-ENTMCNC: 34.8 G/DL (ref 32–36)
MCV RBC AUTO: 111 FL (ref 82–98)
MONOCYTES # BLD AUTO: 0.9 K/UL (ref 0.3–1)
MONOCYTES NFR BLD: 12.5 % (ref 4–15)
MV PEAK A VEL: 0.87 M/S
MV PEAK E VEL: 0.89 M/S
MV STENOSIS PRESSURE HALF TIME: 45.81 MS
MV VALVE AREA P 1/2 METHOD: 4.8 CM2
NEUTROPHILS # BLD AUTO: 4 K/UL (ref 1.8–7.7)
NEUTROPHILS NFR BLD: 54.6 % (ref 38–73)
NONHDLC SERPL-MCNC: 171 MG/DL
NRBC BLD-RTO: 0 /100 WBC
PHOSPHATE SERPL-MCNC: 4.3 MG/DL (ref 2.7–4.5)
PISA MRMAX VEL: 4.79 M/S
PISA TR MAX VEL: 1.62 M/S
PLATELET # BLD AUTO: 182 K/UL (ref 150–450)
PMV BLD AUTO: 11.6 FL (ref 9.2–12.9)
POTASSIUM SERPL-SCNC: 3.6 MMOL/L (ref 3.5–5.1)
PROT SERPL-MCNC: 6.5 G/DL (ref 6–8.4)
PROTHROMBIN TIME: 11.8 SEC (ref 9–12.5)
PULM VEIN S/D RATIO: 0.86
PV MEAN GRADIENT: 0.63 MMHG
PV MV: 0.56 M/S
PV PEAK D VEL: 0.29 M/S
PV PEAK S VEL: 0.25 M/S
PV PEAK VELOCITY: 0.72 CM/S
RA MAJOR: 4.51 CM
RBC # BLD AUTO: 2.86 M/UL (ref 4–5.4)
RV TISSUE DOPPLER FREE WALL SYSTOLIC VELOCITY 1 (APICAL 4 CHAMBER VIEW): 0.01 CM/S
SODIUM SERPL-SCNC: 138 MMOL/L (ref 136–145)
STJ: 2.62 CM
T4 FREE SERPL-MCNC: 0.67 NG/DL (ref 0.71–1.51)
TDI LATERAL: 0.11 M/S
TDI SEPTAL: 0.07 M/S
TDI: 0.09 M/S
TR MAX PG: 10 MMHG
TRIGL SERPL-MCNC: 199 MG/DL (ref 30–150)
TROPONIN I SERPL DL<=0.01 NG/ML-MCNC: <0.006 NG/ML (ref 0–0.03)
TROPONIN I SERPL DL<=0.01 NG/ML-MCNC: <0.006 NG/ML (ref 0–0.03)
TSH SERPL DL<=0.005 MIU/L-ACNC: 48 UIU/ML (ref 0.4–4)
WBC # BLD AUTO: 7.29 K/UL (ref 3.9–12.7)

## 2023-06-03 PROCEDURE — 83036 HEMOGLOBIN GLYCOSYLATED A1C: CPT | Performed by: INTERNAL MEDICINE

## 2023-06-03 PROCEDURE — 96376 TX/PRO/DX INJ SAME DRUG ADON: CPT

## 2023-06-03 PROCEDURE — 80061 LIPID PANEL: CPT | Performed by: INTERNAL MEDICINE

## 2023-06-03 PROCEDURE — 84439 ASSAY OF FREE THYROXINE: CPT | Performed by: INTERNAL MEDICINE

## 2023-06-03 PROCEDURE — 97112 NEUROMUSCULAR REEDUCATION: CPT

## 2023-06-03 PROCEDURE — 97161 PT EVAL LOW COMPLEX 20 MIN: CPT

## 2023-06-03 PROCEDURE — 85610 PROTHROMBIN TIME: CPT | Performed by: INTERNAL MEDICINE

## 2023-06-03 PROCEDURE — 97165 OT EVAL LOW COMPLEX 30 MIN: CPT

## 2023-06-03 PROCEDURE — 25000003 PHARM REV CODE 250: Performed by: INTERNAL MEDICINE

## 2023-06-03 PROCEDURE — 97530 THERAPEUTIC ACTIVITIES: CPT

## 2023-06-03 PROCEDURE — 84443 ASSAY THYROID STIM HORMONE: CPT | Performed by: INTERNAL MEDICINE

## 2023-06-03 PROCEDURE — 96375 TX/PRO/DX INJ NEW DRUG ADDON: CPT | Mod: 59

## 2023-06-03 PROCEDURE — 96361 HYDRATE IV INFUSION ADD-ON: CPT

## 2023-06-03 PROCEDURE — G0378 HOSPITAL OBSERVATION PER HR: HCPCS

## 2023-06-03 PROCEDURE — 85025 COMPLETE CBC W/AUTO DIFF WBC: CPT | Performed by: INTERNAL MEDICINE

## 2023-06-03 PROCEDURE — 63600175 PHARM REV CODE 636 W HCPCS: Performed by: STUDENT IN AN ORGANIZED HEALTH CARE EDUCATION/TRAINING PROGRAM

## 2023-06-03 PROCEDURE — 83735 ASSAY OF MAGNESIUM: CPT | Performed by: INTERNAL MEDICINE

## 2023-06-03 PROCEDURE — 96374 THER/PROPH/DIAG INJ IV PUSH: CPT | Mod: 59

## 2023-06-03 PROCEDURE — 92610 EVALUATE SWALLOWING FUNCTION: CPT

## 2023-06-03 PROCEDURE — S4991 NICOTINE PATCH NONLEGEND: HCPCS | Performed by: INTERNAL MEDICINE

## 2023-06-03 PROCEDURE — 84484 ASSAY OF TROPONIN QUANT: CPT | Performed by: INTERNAL MEDICINE

## 2023-06-03 PROCEDURE — 84100 ASSAY OF PHOSPHORUS: CPT | Performed by: INTERNAL MEDICINE

## 2023-06-03 PROCEDURE — 63600175 PHARM REV CODE 636 W HCPCS: Performed by: INTERNAL MEDICINE

## 2023-06-03 PROCEDURE — 85730 THROMBOPLASTIN TIME PARTIAL: CPT | Performed by: INTERNAL MEDICINE

## 2023-06-03 PROCEDURE — 80053 COMPREHEN METABOLIC PANEL: CPT | Performed by: INTERNAL MEDICINE

## 2023-06-03 PROCEDURE — 25000003 PHARM REV CODE 250: Performed by: STUDENT IN AN ORGANIZED HEALTH CARE EDUCATION/TRAINING PROGRAM

## 2023-06-03 PROCEDURE — 96372 THER/PROPH/DIAG INJ SC/IM: CPT | Performed by: INTERNAL MEDICINE

## 2023-06-03 RX ORDER — LEVOTHYROXINE SODIUM 100 UG/1
200 TABLET ORAL
Status: DISCONTINUED | OUTPATIENT
Start: 2023-06-04 | End: 2023-06-03 | Stop reason: DRUGHIGH

## 2023-06-03 RX ORDER — LEVOTHYROXINE SODIUM 75 UG/1
75 TABLET ORAL
Status: DISCONTINUED | OUTPATIENT
Start: 2023-06-03 | End: 2023-06-03 | Stop reason: DRUGHIGH

## 2023-06-03 RX ORDER — LEVOTHYROXINE SODIUM 75 UG/1
75 TABLET ORAL
Status: DISCONTINUED | OUTPATIENT
Start: 2023-06-04 | End: 2023-06-03

## 2023-06-03 RX ORDER — METOCLOPRAMIDE HYDROCHLORIDE 5 MG/ML
10 INJECTION INTRAMUSCULAR; INTRAVENOUS ONCE
Status: COMPLETED | OUTPATIENT
Start: 2023-06-03 | End: 2023-06-03

## 2023-06-03 RX ORDER — SODIUM CHLORIDE, SODIUM LACTATE, POTASSIUM CHLORIDE, CALCIUM CHLORIDE 600; 310; 30; 20 MG/100ML; MG/100ML; MG/100ML; MG/100ML
INJECTION, SOLUTION INTRAVENOUS CONTINUOUS
Status: DISCONTINUED | OUTPATIENT
Start: 2023-06-03 | End: 2023-06-04

## 2023-06-03 RX ADMIN — BUTALBITAL, ACETAMINOPHEN, AND CAFFEINE 1 TABLET: 325; 50; 40 TABLET ORAL at 05:06

## 2023-06-03 RX ADMIN — BUTALBITAL, ACETAMINOPHEN, AND CAFFEINE 1 TABLET: 325; 50; 40 TABLET ORAL at 10:06

## 2023-06-03 RX ADMIN — NICOTINE 1 PATCH: 21 PATCH, EXTENDED RELEASE TRANSDERMAL at 08:06

## 2023-06-03 RX ADMIN — ENOXAPARIN SODIUM 40 MG: 40 INJECTION SUBCUTANEOUS at 04:06

## 2023-06-03 RX ADMIN — LEVOTHYROXINE SODIUM 75 MCG: 75 TABLET ORAL at 10:06

## 2023-06-03 RX ADMIN — ATORVASTATIN CALCIUM 40 MG: 40 TABLET, FILM COATED ORAL at 08:06

## 2023-06-03 RX ADMIN — ACETAMINOPHEN 650 MG: 325 TABLET ORAL at 12:06

## 2023-06-03 RX ADMIN — ONDANSETRON 4 MG: 2 INJECTION INTRAMUSCULAR; INTRAVENOUS at 02:06

## 2023-06-03 RX ADMIN — SODIUM CHLORIDE, POTASSIUM CHLORIDE, SODIUM LACTATE AND CALCIUM CHLORIDE: 600; 310; 30; 20 INJECTION, SOLUTION INTRAVENOUS at 12:06

## 2023-06-03 RX ADMIN — METOCLOPRAMIDE 10 MG: 5 INJECTION, SOLUTION INTRAMUSCULAR; INTRAVENOUS at 12:06

## 2023-06-03 RX ADMIN — MORPHINE SULFATE 2 MG: 2 INJECTION, SOLUTION INTRAMUSCULAR; INTRAVENOUS at 12:06

## 2023-06-03 RX ADMIN — MORPHINE SULFATE 2 MG: 2 INJECTION, SOLUTION INTRAMUSCULAR; INTRAVENOUS at 10:06

## 2023-06-03 RX ADMIN — CLOPIDOGREL BISULFATE 75 MG: 75 TABLET ORAL at 08:06

## 2023-06-03 NOTE — PROGRESS NOTES
[facility] Medicine  Progress Note    Patient Name: Josey Flores  MRN: [id number]  Patient Class: OP- Observation   Admission Date: 6/2/2023  Length of Stay: 0 days  Attending Physician: Raoul Panchal MD  Primary Care Provider: Kishore Persaud MD        Subjective:     Principal Problem:TIA (transient ischemic attack)        HPI:  Ms. Flores is a 52-year-old  female with PMH significant for metastatic breast cancer, currently in remission, followed by Dr. Joseph, COPD, asthma, hypothyroidism, presented to the ED complaining of 2-3 dose of left-sided numbness/weakness, but did not seek medical attention.  She also reports chest discomfort, radiating to left upper arm earlier today.  She has fallen couple of times over the past three days.  No prior known history of CAD or CVA.  In the ED laboratory workup is unremarkable.  CT head is negative for acute intracranial pathology.  Troponin within normal limits, EKG with no ST or T-wave changes.  Placed in observation for TIA workup.         Overview/Hospital Course:  Admitted for CVA, initial left-sided hemiparesis which is slowly improving since admission. MRI pending. Continue Plavix, statin. PT/OT to eval and treat      Review of Systems   Constitutional: Negative.  Negative for chills and fever.   HENT: Negative.  Negative for congestion, rhinorrhea, sore throat and trouble swallowing.    Eyes: Negative.  Negative for visual disturbance.   Respiratory: Negative.  Negative for cough, shortness of breath and wheezing.    Cardiovascular: Negative.  Negative for chest pain and palpitations.   Gastrointestinal: Negative.  Negative for abdominal pain, diarrhea, nausea and vomiting.   Endocrine: Negative.    Genitourinary: Negative.  Negative for dysuria and flank pain.   Musculoskeletal: Negative.  Negative for back pain.   Skin: Negative.  Negative for rash.   Allergic/Immunologic: Negative.    Neurological:  Positive for weakness  (left sided) and numbness (left sided). Negative for speech difficulty and headaches.   Hematological: Negative.    Psychiatric/Behavioral: Negative.  Negative for hallucinations. The patient is not nervous/anxious.    All other systems reviewed and are negative.  Objective:     Vital Signs (Most Recent):  Temp: 97.4 °F (36.3 °C) (06/03/23 0706)  Pulse: 71 (06/03/23 0900)  Resp: 18 (06/03/23 1006)  BP: (!) 101/55 (06/03/23 0706)  SpO2: 97 % (06/03/23 0706) Vital Signs (24h Range):  Temp:  [97.4 °F (36.3 °C)-98.8 °F (37.1 °C)] 97.4 °F (36.3 °C)  Pulse:  [71-90] 71  Resp:  [13-18] 18  SpO2:  [95 %-100 %] 97 %  BP: (101-139)/(51-75) 101/55     Weight: 97.3 kg (214 lb 6.4 oz)  Body mass index is 33.58 kg/m².  No intake or output data in the 24 hours ending 06/03/23 1107      Physical Exam  Vitals and nursing note reviewed.   Constitutional:       General: She is awake. She is not in acute distress.     Appearance: She is obese. She is not ill-appearing.   HENT:      Head: Normocephalic and atraumatic.      Mouth/Throat:      Mouth: Mucous membranes are moist.   Eyes:      General: No scleral icterus.     Conjunctiva/sclera: Conjunctivae normal.   Cardiovascular:      Rate and Rhythm: Normal rate and regular rhythm.      Heart sounds: No murmur heard.  Pulmonary:      Effort: Pulmonary effort is normal. No respiratory distress.      Breath sounds: Normal breath sounds. No wheezing.   Abdominal:      Palpations: Abdomen is soft.      Tenderness: There is no abdominal tenderness.   Musculoskeletal:         General: No swelling. Normal range of motion.      Cervical back: Normal range of motion and neck supple.   Skin:     General: Skin is warm.      Coloration: Skin is not jaundiced.   Neurological:      Mental Status: She is alert and oriented to person, place, and time.      Motor: Weakness (Decreased motor strength left upper and lower extremity compared to right) present.   Psychiatric:         Attention and  Perception: Attention normal.         Speech: Speech normal.         Behavior: Behavior is cooperative.           Significant Labs: All pertinent labs within the past 24 hours have been reviewed.    Significant Imaging: I have reviewed all pertinent imaging results/findings within the past 24 hours.      Assessment/Plan:      * TIA (transient ischemic attack)    Antithrombotics for secondary stroke prevention: Antiplatelets: Clopidogrel: 75 mg daily    Statins for secondary stroke prevention and hyperlipidemia, if present:   Statins: Atorvastatin- 40 mg daily    Aggressive risk factor modification: HTN, Smoking     Rehab efforts: The patient has been evaluated by a stroke team provider and the therapy needs have been fully considered based off the presenting complaints and exam findings. The following therapy evaluations are needed: PT evaluate and treat, OT evaluate and treat, SLP evaluate and treat    Diagnostics ordered/pending: Carotid ultrasound to assess vasculature, CT scan of head without contrast to asses brain parenchyma, HgbA1C to assess blood glucose levels, Lipid Profile to assess cholesterol levels, MRA head to assess vasculature, MRI head without contrast to assess brain parenchyma, TTE to assess cardiac function/status , TSH to assess thyroid function    VTE prophylaxis: Enoxaparin 40 mg SQ every 24 hours    BP parameters: TIA: SBP <220 until imaging confirmation of no infarct         Primary breast cancer with metastasis to other site  -history of breast cancer, currently in remission.   -last chemo six months ago.    -followed by Dr. Joseph      Hypothyroidism  -synthroid      Tobacco abuse disorder  -nicotine patch        VTE Risk Mitigation (From admission, onward)         Ordered     enoxaparin injection 40 mg  Daily         06/02/23 2009     IP VTE HIGH RISK PATIENT  Once         06/02/23 2009     Place sequential compression device  Until discontinued         06/02/23 2009                 Discharge Planning   NICOLA:      Code Status: Full Code   Is the patient medically ready for discharge?:     Reason for patient still in hospital (select all that apply): Patient trending condition, Treatment, Imaging and PT / OT recommendations                     Raoul Panchal MD  Department of Hospital Medicine   [facility] ([facility])

## 2023-06-03 NOTE — SUBJECTIVE & OBJECTIVE
Past Medical History:   Diagnosis Date    Anxiety     Asthma     Breast cancer 2017    Cancer     right breast, metastatic-lymph nodes, bone, and thyroid     COPD (chronic obstructive pulmonary disease)     Depression     History of psychiatric hospitalization     2000; suicidal ideation    Hx of psychiatric care     Hypothyroidism     Miscarriage     five miscarriages    Obesity     Psychiatric problem     Thyroid cancer 2017       Past Surgical History:   Procedure Laterality Date    ABSCESS DRAINAGE      bilateral axilla    ADENOIDECTOMY      APPENDECTOMY      BREAST BIOPSY Right     x3    CHOLECYSTECTOMY      ENDOBRONCHIAL ULTRASOUND Bilateral 02/18/2021    Procedure: ENDOBRONCHIAL ULTRASOUND (EBUS);  Surgeon: Jaorn Ni MD;  Location: [facility] ENDO;  Service: Pulmonary;  Laterality: Bilateral;    FLUOROSCOPY N/A 01/28/2021    Procedure: Mediport placement;  Surgeon: Noah Phelan MD;  Location: [facility] CATH LAB;  Service: General;  Laterality: N/A;    MASS EXCISION      MEDIPORT INSERTION, SINGLE Right 02/2021    SKIN GRAFT  06/16/2017    THYROIDECTOMY Bilateral     TONSILLECTOMY         Review of patient's allergies indicates:   Allergen Reactions    Gabapentin Swelling     Note: unilateral joint edema, unclear if due to gabapentin    Nsaids (non-steroidal anti-inflammatory drug) Other (See Comments)     Instructed not to take NSAID drugs with chemo pill.    Clindamycin Rash    Vancomycin analogues Itching       No current facility-administered medications on file prior to encounter.     Current Outpatient Medications on File Prior to Encounter   Medication Sig    albuterol (PROVENTIL/VENTOLIN HFA) 90 mcg/actuation inhaler Inhale 2 puffs into the lungs every 4 (four) hours as needed for Wheezing or Shortness of Breath.    ALPRAZolam (XANAX) 1 MG tablet Take 0.5-1 tablets (0.5-1 mg total) by mouth 2 (two) times daily as needed for Anxiety.    buPROPion (WELLBUTRIN SR) 150 MG TBSR 12 hr tablet Take 1 tablet  (150 mg total) by mouth 2 (two) times daily. Take 1 tablet (150mg) once daily for 1 week then increase to twice daily    calcium carbonate 400 mg calcium (1,000 mg) Chew Take 2,000 mg by mouth once daily.    cyanocobalamin (VITAMIN B-12) 1000 MCG tablet Take 1 tablet (1,000 mcg total) by mouth once daily.    diphenoxylate-atropine 2.5-0.025 mg (LOMOTIL) 2.5-0.025 mg per tablet Take 1 tablet by mouth 4 (four) times daily as needed for Diarrhea.    DULoxetine (CYMBALTA) 30 MG capsule Take 1 capsule (30 mg total) by mouth once daily. It may take up to 2 to 6 weeks to see full effect of Cymbalta.    ergocalciferol (VITAMIN D2) 50,000 unit Cap Take 5,000 Units by mouth once daily at 6am.     HYDROcodone-acetaminophen (NORCO)  mg per tablet Take 1 tablet by mouth every 6 (six) hours as needed for Pain. No more than 4 doses per day    hydrOXYzine HCL (ATARAX) 25 MG tablet Take 1 tablet (25 mg total) by mouth 3 (three) times daily as needed for Itching.    ipratropium (ATROVENT) 42 mcg (0.06 %) nasal spray 2 sprays by Nasal route 4 (four) times daily. As needed for rhinitis. Take in evening before bed and in the morning    letrozole (FEMARA) 2.5 mg Tab Take 1 tablet (2.5 mg total) by mouth once daily.    levothyroxine (SYNTHROID) 200 MCG tablet Take 1 tablet (200 mcg total) by mouth once daily.    levothyroxine (SYNTHROID) 75 MCG tablet Take 1 tablet (75 mcg total) by mouth before breakfast.    morphine (MS CONTIN) 15 MG 12 hr tablet Take 1 tablet (15 mg total) by mouth 2 (two) times daily.    multivitamin (THERAGRAN) per tablet Take 1 tablet by mouth once daily.    naloxone (NARCAN) 4 mg/actuation Spry SMARTSIG:Both Nares    palbociclib (IBRANCE) 125 mg Cap Take one capsule (125 mg) by mouth once daily on days 1-21 of each 28-day cycle.    pentoxifylline (TRENTAL) 400 mg TbSR Take 1 tablet (400 mg total) by mouth 2 (two) times a day.    potassium chloride SA (K-DUR,KLOR-CON) 20 MEQ tablet Take 1 tablet (20 mEq total)  by mouth once daily. Take daily for 3 days.    vitamin E 1000 UNIT capsule Take 1 capsule (1,000 Units total) by mouth once daily.    [DISCONTINUED] OLANZapine (ZYPREXA) 5 MG tablet Take 1 tablet (5 mg total) by mouth every evening.     Family History       Problem Relation (Age of Onset)    Arthritis Mother    Cancer Maternal Aunt    Coronary artery disease Father    Early death Mother    Hearing loss Father    Heart attack Mother    Heart disease Mother, Father    Heart failure Mother    Hyperlipidemia Father    Hypertension Mother, Father    Learning disabilities Son    Mental illness Father          Tobacco Use    Smoking status: Every Day     Packs/day: 1.00     Years: 20.00     Pack years: 20.00     Types: Cigarettes     Start date: 1/1/1985    Smokeless tobacco: Never    Tobacco comments:     45 pack year history   Substance and Sexual Activity    Alcohol use: No    Drug use: No    Sexual activity: Not Currently     Partners: Male     Birth control/protection: None     Review of Systems   Constitutional: Negative.  Negative for chills and fever.   HENT: Negative.  Negative for congestion, rhinorrhea, sore throat and trouble swallowing.    Eyes: Negative.  Negative for visual disturbance.   Respiratory: Negative.  Negative for cough, shortness of breath and wheezing.    Cardiovascular: Negative.  Negative for chest pain and palpitations.   Gastrointestinal: Negative.  Negative for abdominal pain, diarrhea, nausea and vomiting.   Endocrine: Negative.    Genitourinary: Negative.  Negative for dysuria and flank pain.   Musculoskeletal: Negative.  Negative for back pain.   Skin: Negative.  Negative for rash.   Allergic/Immunologic: Negative.    Neurological:  Positive for weakness (left sided) and numbness (left sided). Negative for speech difficulty and headaches.   Hematological: Negative.    Psychiatric/Behavioral: Negative.  Negative for hallucinations. The patient is not nervous/anxious.    All other systems  reviewed and are negative.  Objective:     Vital Signs (Most Recent):  Temp: 98.4 °F (36.9 °C) (06/02/23 1830)  Pulse: 85 (06/02/23 1848)  Resp: 17 (06/02/23 1838)  BP: (!) 113/58 (06/02/23 1848)  SpO2: 97 % (06/02/23 1848) Vital Signs (24h Range):  Temp:  [97.4 °F (36.3 °C)-98.4 °F (36.9 °C)] 98.4 °F (36.9 °C)  Pulse:  [71-90] 85  Resp:  [16-18] 17  SpO2:  [97 %-98 %] 97 %  BP: (112-118)/(58-60) 113/58     Weight: 97.3 kg (214 lb 6.4 oz)  Body mass index is 33.58 kg/m².     Physical Exam  Vitals and nursing note reviewed.   Constitutional:       General: She is awake. She is not in acute distress.     Appearance: She is obese. She is not ill-appearing.   HENT:      Head: Normocephalic and atraumatic.      Mouth/Throat:      Mouth: Mucous membranes are moist.   Eyes:      General: No scleral icterus.     Conjunctiva/sclera: Conjunctivae normal.   Cardiovascular:      Rate and Rhythm: Normal rate and regular rhythm.      Heart sounds: No murmur heard.  Pulmonary:      Effort: Pulmonary effort is normal. No respiratory distress.      Breath sounds: Normal breath sounds. No wheezing.   Abdominal:      Palpations: Abdomen is soft.      Tenderness: There is no abdominal tenderness.   Musculoskeletal:         General: No swelling. Normal range of motion.      Cervical back: Normal range of motion and neck supple.   Skin:     General: Skin is warm.      Coloration: Skin is not jaundiced.   Neurological:      Mental Status: She is alert and oriented to person, place, and time.      Motor: Weakness (Decreased motor strength left upper and lower extremity compared to right) present.   Psychiatric:         Attention and Perception: Attention normal.         Speech: Speech normal.         Behavior: Behavior is cooperative.              Significant Labs: All pertinent labs within the past 24 hours have been reviewed.  BMP:   Recent Labs   Lab 06/02/23  1544   *   *   K 3.8      CO2 19*   BUN 17   CREATININE  1.4   CALCIUM 9.0     CBC:   Recent Labs   Lab 06/02/23  1544   WBC 8.65   HGB 11.9*   HCT 33.3*        CMP:   Recent Labs   Lab 06/02/23  1544   *   K 3.8      CO2 19*   *   BUN 17   CREATININE 1.4   CALCIUM 9.0   PROT 7.3   ALBUMIN 3.9   BILITOT 0.4   ALKPHOS 74   AST 24   ALT 19   ANIONGAP 16     Troponin:   Recent Labs   Lab 06/02/23  1544   TROPONINI 0.008       Significant Imaging: I have reviewed all pertinent imaging results/findings within the past 24 hours.  I have reviewed and interpreted all pertinent imaging results/findings within the past 24 hours.    Imaging Results              X-Ray Chest AP Portable (Final result)  Result time 06/02/23 17:04:19      Final result by Raphael Morris MD (06/02/23 17:04:19)                   Impression:      No acute abnormality.      Electronically signed by: Raphael Morris  Date:    06/02/2023  Time:    17:04               Narrative:    EXAMINATION:  XR CHEST AP PORTABLE    CLINICAL HISTORY:  Chest Pain;    TECHNIQUE:  Single frontal view of the chest was performed.    COMPARISON:  Multiple priors.    FINDINGS:  The lungs are clear, with normal appearance of pulmonary vasculature and no pleural effusion or pneumothorax.    The cardiac silhouette is normal in size. The hilar and mediastinal contours are unremarkable.    Bones are intact.                                       CT Head Without Contrast (Final result)  Result time 06/02/23 16:21:58      Final result by Hali Sadler MD (06/02/23 16:21:58)                   Impression:      No overt acute intracranial finding      Electronically signed by: Hali Sadler  Date:    06/02/2023  Time:    16:21               Narrative:    EXAMINATION:  CT HEAD WITHOUT CONTRAST    CLINICAL HISTORY:  Headache, new or worsening (Age >= 50y);    TECHNIQUE:  Low dose axial images were obtained through the head.  Coronal and sagittal reformations were also performed. Contrast was not  administered.    COMPARISON:  2022 October    FINDINGS:  No acute intracranial hemorrhage or mass effect.  Chronic microangiopathic change or demyelination likely.  Ventricles and basal cisterns maintain.  No displaced bony finding.  Visualized sinuses and mastoids well aerated.                                    I have independently reviewed and interpreted the EKG.     I have independently reviewed all pertinent labs within the past 24 hours.    I have independently reviewed, visualized and interpreted all pertinent imaging results within the past 24 hours and discussed the findings with the ED physician, Dr. Dockery

## 2023-06-03 NOTE — ASSESSMENT & PLAN NOTE
-history of breast cancer, currently in remission.   -last chemo six months ago.    -followed by Dr. Joseph

## 2023-06-03 NOTE — H&P
[facility]  History & Physical    Patient Name: Josey Flores  MRN: [id number]  Patient Class: OP- Observation  Admission Date: 6/2/2023  Attending Physician: Herminio Aviles MD   Primary Care Provider: Kishore Persaud MD         Patient information was obtained from patient, spouse/SO, past medical records and ER records.     Subjective:     Principal Problem:TIA (transient ischemic attack)    Chief Complaint:   Chief Complaint   Patient presents with    Headache    Numbness     Pt started with HA and numbness on the L side, also having some chest pain, all of this started Tuesday, pt having some nausea no vomiting, also having some sob        HPI: Ms. Flores is a 52-year-old  female with PMH significant for metastatic breast cancer, currently in remission, followed by Dr. Joseph, COPD, asthma, hypothyroidism, presented to the ED complaining of 2-3 dose of left-sided numbness/weakness, but did not seek medical attention.  She also reports chest discomfort, radiating to left upper arm earlier today.  She has fallen couple of times over the past three days.  No prior known history of CAD or CVA.  In the ED laboratory workup is unremarkable.  CT head is negative for acute intracranial pathology.  Troponin within normal limits, EKG with no ST or T-wave changes.  Placed in observation for TIA workup.         Past Medical History:   Diagnosis Date    Anxiety     Asthma     Breast cancer 2017    Cancer     right breast, metastatic-lymph nodes, bone, and thyroid     COPD (chronic obstructive pulmonary disease)     Depression     History of psychiatric hospitalization     2000; suicidal ideation    Hx of psychiatric care     Hypothyroidism     Miscarriage     five miscarriages    Obesity     Psychiatric problem     Thyroid cancer 2017       Past Surgical History:   Procedure Laterality Date    ABSCESS DRAINAGE      bilateral axilla    ADENOIDECTOMY       APPENDECTOMY      BREAST BIOPSY Right     x3    CHOLECYSTECTOMY      ENDOBRONCHIAL ULTRASOUND Bilateral 02/18/2021    Procedure: ENDOBRONCHIAL ULTRASOUND (EBUS);  Surgeon: Jaron Ni MD;  Location: [facility] ENDO;  Service: Pulmonary;  Laterality: Bilateral;    FLUOROSCOPY N/A 01/28/2021    Procedure: Mediport placement;  Surgeon: Noah Phelan MD;  Location: [facility] CATH LAB;  Service: General;  Laterality: N/A;    MASS EXCISION      MEDIPORT INSERTION, SINGLE Right 02/2021    SKIN GRAFT  06/16/2017    THYROIDECTOMY Bilateral     TONSILLECTOMY         Review of patient's allergies indicates:   Allergen Reactions    Gabapentin Swelling     Note: unilateral joint edema, unclear if due to gabapentin    Nsaids (non-steroidal anti-inflammatory drug) Other (See Comments)     Instructed not to take NSAID drugs with chemo pill.    Clindamycin Rash    Vancomycin analogues Itching       No current facility-administered medications on file prior to encounter.     Current Outpatient Medications on File Prior to Encounter   Medication Sig    albuterol (PROVENTIL/VENTOLIN HFA) 90 mcg/actuation inhaler Inhale 2 puffs into the lungs every 4 (four) hours as needed for Wheezing or Shortness of Breath.    ALPRAZolam (XANAX) 1 MG tablet Take 0.5-1 tablets (0.5-1 mg total) by mouth 2 (two) times daily as needed for Anxiety.    buPROPion (WELLBUTRIN SR) 150 MG TBSR 12 hr tablet Take 1 tablet (150 mg total) by mouth 2 (two) times daily. Take 1 tablet (150mg) once daily for 1 week then increase to twice daily    calcium carbonate 400 mg calcium (1,000 mg) Chew Take 2,000 mg by mouth once daily.    cyanocobalamin (VITAMIN B-12) 1000 MCG tablet Take 1 tablet (1,000 mcg total) by mouth once daily.    diphenoxylate-atropine 2.5-0.025 mg (LOMOTIL) 2.5-0.025 mg per tablet Take 1 tablet by mouth 4 (four) times daily as needed for Diarrhea.    DULoxetine (CYMBALTA) 30 MG capsule Take 1 capsule (30 mg total) by mouth once  daily. It may take up to 2 to 6 weeks to see full effect of Cymbalta.    ergocalciferol (VITAMIN D2) 50,000 unit Cap Take 5,000 Units by mouth once daily at 6am.     HYDROcodone-acetaminophen (NORCO)  mg per tablet Take 1 tablet by mouth every 6 (six) hours as needed for Pain. No more than 4 doses per day    hydrOXYzine HCL (ATARAX) 25 MG tablet Take 1 tablet (25 mg total) by mouth 3 (three) times daily as needed for Itching.    ipratropium (ATROVENT) 42 mcg (0.06 %) nasal spray 2 sprays by Nasal route 4 (four) times daily. As needed for rhinitis. Take in evening before bed and in the morning    letrozole (FEMARA) 2.5 mg Tab Take 1 tablet (2.5 mg total) by mouth once daily.    levothyroxine (SYNTHROID) 200 MCG tablet Take 1 tablet (200 mcg total) by mouth once daily.    levothyroxine (SYNTHROID) 75 MCG tablet Take 1 tablet (75 mcg total) by mouth before breakfast.    morphine (MS CONTIN) 15 MG 12 hr tablet Take 1 tablet (15 mg total) by mouth 2 (two) times daily.    multivitamin (THERAGRAN) per tablet Take 1 tablet by mouth once daily.    naloxone (NARCAN) 4 mg/actuation Spry SMARTSIG:Both Nares    palbociclib (IBRANCE) 125 mg Cap Take one capsule (125 mg) by mouth once daily on days 1-21 of each 28-day cycle.    pentoxifylline (TRENTAL) 400 mg TbSR Take 1 tablet (400 mg total) by mouth 2 (two) times a day.    potassium chloride SA (K-DUR,KLOR-CON) 20 MEQ tablet Take 1 tablet (20 mEq total) by mouth once daily. Take daily for 3 days.    vitamin E 1000 UNIT capsule Take 1 capsule (1,000 Units total) by mouth once daily.    [DISCONTINUED] OLANZapine (ZYPREXA) 5 MG tablet Take 1 tablet (5 mg total) by mouth every evening.     Family History       Problem Relation (Age of Onset)    Arthritis Mother    Cancer Maternal Aunt    Coronary artery disease Father    Early death Mother    Hearing loss Father    Heart attack Mother    Heart disease Mother, Father    Heart failure Mother    Hyperlipidemia  Father    Hypertension Mother, Father    Learning disabilities Son    Mental illness Father          Tobacco Use    Smoking status: Every Day     Packs/day: 1.00     Years: 20.00     Pack years: 20.00     Types: Cigarettes     Start date: 1/1/1985    Smokeless tobacco: Never    Tobacco comments:     45 pack year history   Substance and Sexual Activity    Alcohol use: No    Drug use: No    Sexual activity: Not Currently     Partners: Male     Birth control/protection: None     Review of Systems   Constitutional: Negative.  Negative for chills and fever.   HENT: Negative.  Negative for congestion, rhinorrhea, sore throat and trouble swallowing.    Eyes: Negative.  Negative for visual disturbance.   Respiratory: Negative.  Negative for cough, shortness of breath and wheezing.    Cardiovascular: Negative.  Negative for chest pain and palpitations.   Gastrointestinal: Negative.  Negative for abdominal pain, diarrhea, nausea and vomiting.   Endocrine: Negative.    Genitourinary: Negative.  Negative for dysuria and flank pain.   Musculoskeletal: Negative.  Negative for back pain.   Skin: Negative.  Negative for rash.   Allergic/Immunologic: Negative.    Neurological:  Positive for weakness (left sided) and numbness (left sided). Negative for speech difficulty and headaches.   Hematological: Negative.    Psychiatric/Behavioral: Negative.  Negative for hallucinations. The patient is not nervous/anxious.    All other systems reviewed and are negative.  Objective:     Vital Signs (Most Recent):  Temp: 98.4 °F (36.9 °C) (06/02/23 1830)  Pulse: 85 (06/02/23 1848)  Resp: 17 (06/02/23 1838)  BP: (!) 113/58 (06/02/23 1848)  SpO2: 97 % (06/02/23 1848) Vital Signs (24h Range):  Temp:  [97.4 °F (36.3 °C)-98.4 °F (36.9 °C)] 98.4 °F (36.9 °C)  Pulse:  [71-90] 85  Resp:  [16-18] 17  SpO2:  [97 %-98 %] 97 %  BP: (112-118)/(58-60) 113/58     Weight: 97.3 kg (214 lb 6.4 oz)  Body mass index is 33.58 kg/m².     Physical Exam  Vitals  and nursing note reviewed.   Constitutional:       General: She is awake. She is not in acute distress.     Appearance: She is obese. She is not ill-appearing.   HENT:      Head: Normocephalic and atraumatic.      Mouth/Throat:      Mouth: Mucous membranes are moist.   Eyes:      General: No scleral icterus.     Conjunctiva/sclera: Conjunctivae normal.   Cardiovascular:      Rate and Rhythm: Normal rate and regular rhythm.      Heart sounds: No murmur heard.  Pulmonary:      Effort: Pulmonary effort is normal. No respiratory distress.      Breath sounds: Normal breath sounds. No wheezing.   Abdominal:      Palpations: Abdomen is soft.      Tenderness: There is no abdominal tenderness.   Musculoskeletal:         General: No swelling. Normal range of motion.      Cervical back: Normal range of motion and neck supple.   Skin:     General: Skin is warm.      Coloration: Skin is not jaundiced.   Neurological:      Mental Status: She is alert and oriented to person, place, and time.      Motor: Weakness (Decreased motor strength left upper and lower extremity compared to right) present.   Psychiatric:         Attention and Perception: Attention normal.         Speech: Speech normal.         Behavior: Behavior is cooperative.              Significant Labs: All pertinent labs within the past 24 hours have been reviewed.  BMP:   Recent Labs   Lab 06/02/23  1544   *   *   K 3.8      CO2 19*   BUN 17   CREATININE 1.4   CALCIUM 9.0     CBC:   Recent Labs   Lab 06/02/23  1544   WBC 8.65   HGB 11.9*   HCT 33.3*        CMP:   Recent Labs   Lab 06/02/23  1544   *   K 3.8      CO2 19*   *   BUN 17   CREATININE 1.4   CALCIUM 9.0   PROT 7.3   ALBUMIN 3.9   BILITOT 0.4   ALKPHOS 74   AST 24   ALT 19   ANIONGAP 16     Troponin:   Recent Labs   Lab 06/02/23  1544   TROPONINI 0.008       Significant Imaging: I have reviewed all pertinent imaging results/findings within the past 24 hours.  I  have reviewed and interpreted all pertinent imaging results/findings within the past 24 hours.    Imaging Results              X-Ray Chest AP Portable (Final result)  Result time 06/02/23 17:04:19      Final result by Raphael Morris MD (06/02/23 17:04:19)                   Impression:      No acute abnormality.      Electronically signed by: Raphael Morris  Date:    06/02/2023  Time:    17:04               Narrative:    EXAMINATION:  XR CHEST AP PORTABLE    CLINICAL HISTORY:  Chest Pain;    TECHNIQUE:  Single frontal view of the chest was performed.    COMPARISON:  Multiple priors.    FINDINGS:  The lungs are clear, with normal appearance of pulmonary vasculature and no pleural effusion or pneumothorax.    The cardiac silhouette is normal in size. The hilar and mediastinal contours are unremarkable.    Bones are intact.                                       CT Head Without Contrast (Final result)  Result time 06/02/23 16:21:58      Final result by Hali Sadler MD (06/02/23 16:21:58)                   Impression:      No overt acute intracranial finding      Electronically signed by: Hali Sadler  Date:    06/02/2023  Time:    16:21               Narrative:    EXAMINATION:  CT HEAD WITHOUT CONTRAST    CLINICAL HISTORY:  Headache, new or worsening (Age >= 50y);    TECHNIQUE:  Low dose axial images were obtained through the head.  Coronal and sagittal reformations were also performed. Contrast was not administered.    COMPARISON:  2022 October    FINDINGS:  No acute intracranial hemorrhage or mass effect.  Chronic microangiopathic change or demyelination likely.  Ventricles and basal cisterns maintain.  No displaced bony finding.  Visualized sinuses and mastoids well aerated.                                    I have independently reviewed and interpreted the EKG.     I have independently reviewed all pertinent labs within the past 24 hours.    I have independently reviewed, visualized and interpreted all  pertinent imaging results within the past 24 hours and discussed the findings with the ED physician, Dr. Dockery          Assessment/Plan:     * TIA (transient ischemic attack)    Antithrombotics for secondary stroke prevention: Antiplatelets: Clopidogrel: 75 mg daily    Statins for secondary stroke prevention and hyperlipidemia, if present:   Statins: Atorvastatin- 40 mg daily    Aggressive risk factor modification: HTN, Smoking     Rehab efforts: The patient has been evaluated by a stroke team provider and the therapy needs have been fully considered based off the presenting complaints and exam findings. The following therapy evaluations are needed: PT evaluate and treat, OT evaluate and treat, SLP evaluate and treat    Diagnostics ordered/pending: Carotid ultrasound to assess vasculature, CT scan of head without contrast to asses brain parenchyma, HgbA1C to assess blood glucose levels, Lipid Profile to assess cholesterol levels, MRA head to assess vasculature, MRI head without contrast to assess brain parenchyma, TTE to assess cardiac function/status , TSH to assess thyroid function    VTE prophylaxis: Enoxaparin 40 mg SQ every 24 hours    BP parameters: TIA: SBP <220 until imaging confirmation of no infarct         Primary breast cancer with metastasis to other site  -history of breast cancer, currently in remission.   -last chemo six months ago.    -followed by Dr. Joseph      Hypothyroidism  -synthroid      Tobacco abuse disorder  -nicotine patch        VTE Risk Mitigation (From admission, onward)         Ordered     enoxaparin injection 40 mg  Daily         06/02/23 2009     IP VTE HIGH RISK PATIENT  Once         06/02/23 2009     Place sequential compression device  Until discontinued         06/02/23 2009                   On 06/02/2023, patient should be placed in hospital observation services under my care.        Herminio Aviles MD  Department of Hospital Medicine  [facility] - Telemetry ([facility])

## 2023-06-03 NOTE — HOSPITAL COURSE
Admitted for CVA, initial left-sided hemiparesis which is slowly improving since admission.     MRI brain shows: Multiple right frontal and parietal territory acute infarcts, scattered, some cortical and some non cortical possibly embolic type infarcts. Moderate microvascular ischemic change. No mass lesion, acute hemorrhage.     Continue Plavix, statin. PT/OT to eval and treat recs outpatient therapy

## 2023-06-03 NOTE — HPI
Ms. Flores is a 52-year-old  female with PMH significant for metastatic breast cancer, currently in remission, followed by Dr. Joseph, COPD, asthma, hypothyroidism, presented to the ED complaining of 2-3 dose of left-sided numbness/weakness, but did not seek medical attention.  She also reports chest discomfort, radiating to left upper arm earlier today.  She has fallen couple of times over the past three days.  No prior known history of CAD or CVA.  In the ED laboratory workup is unremarkable.  CT head is negative for acute intracranial pathology.  Troponin within normal limits, EKG with no ST or T-wave changes.  Placed in observation for TIA workup.

## 2023-06-03 NOTE — PLAN OF CARE
Problem: Fall Injury Risk  Goal: Absence of Fall and Fall-Related Injury  Outcome: Ongoing, Progressing   Instruct the patient and family to call for help with activity to prevent a fall, even if feeling able to do things independently. Keep the room and pathway to bathroom clutter-free. Caution patient and family not to disconnect or silence any alarms.

## 2023-06-03 NOTE — PLAN OF CARE
P.T. EVAL COMPLETE.  PT CURRENTLY REQUIRES [facility] FOR BED MOBILITY, SPV FOR TF'S AND GAIT.  P.T. RECOMMENDS OP P.T. AT D/C

## 2023-06-03 NOTE — PLAN OF CARE
S.T. evaluation completed.  Pt with no swallowing difficulties but with min decreased communication skills.  Continued S.T. recommended; please see evl report for details.

## 2023-06-03 NOTE — ASSESSMENT & PLAN NOTE
Antithrombotics for secondary stroke prevention: Antiplatelets: Clopidogrel: 75 mg daily    Statins for secondary stroke prevention and hyperlipidemia, if present:   Statins: Atorvastatin- 40 mg daily    Aggressive risk factor modification: HTN, Smoking     Rehab efforts: The patient has been evaluated by a stroke team provider and the therapy needs have been fully considered based off the presenting complaints and exam findings. The following therapy evaluations are needed: PT evaluate and treat, OT evaluate and treat, SLP evaluate and treat    Diagnostics ordered/pending: Carotid ultrasound to assess vasculature, CT scan of head without contrast to asses brain parenchyma, HgbA1C to assess blood glucose levels, Lipid Profile to assess cholesterol levels, MRA head to assess vasculature, MRI head without contrast to assess brain parenchyma, TTE to assess cardiac function/status , TSH to assess thyroid function    VTE prophylaxis: Enoxaparin 40 mg SQ every 24 hours    BP parameters: TIA: SBP <220 until imaging confirmation of no infarct

## 2023-06-03 NOTE — PT/OT/SLP EVAL
Occupational Therapy   Evaluation and Discharge Note    Name: Josey Flores  MRN: [id number]  Admitting Diagnosis: TIA (transient ischemic attack)  Recent Surgery: * No surgery found *      Recommendations:     Discharge Recommendations: outpatient OT  Discharge Equipment Recommendations: none  Barriers to discharge:  None    Assessment:     Josey Flores is a 52 y.o. female with a medical diagnosis of TIA (transient ischemic attack). At this time, patient is functioning at their prior level of function and does not require further acute OT services.     Plan:     During this hospitalization, patient does not require further acute OT services.  Please re-consult if situation changes.    Plan of Care Reviewed with: patient    Subjective     Chief Complaint: BLE PAIN  Patient/Family Comments/goals: AVOID FALLS DUE TO DIZZINESS.    Occupational Profile:  Living Environment: PATIENT LIVES WITH LAINA IN A MOBILE HOME WITH 4 STEPS WITH 1 RAIL(S).   Previous level of function: PATIENT STATED SHE WAS (I) WITH ALL LEVELS OF SELF CARE AND WAS ABLE TO PERFORM IADL'S WITH FIMYRA (A)'ING WITH COOKING.  PATIENT STATED SHE DOES A LITTLE DRIVING.   Roles and Routines: PATIENT STATED SHE DOES A LITTLE DRIVING.   Equipment Used at home: none  Assistance upon Discharge: FIANCE    Pain/Comfort:  Pain Rating 1: 7/10  Location - Side 1: Bilateral  Location 1: leg  Pain Addressed 1: Reposition, Distraction, Cessation of Activity    Patients cultural, spiritual, Gnosticism conflicts given the current situation:      Objective:     Communicated with: NURSE FERRIS prior to session.  Patient found supine with telemetry, peripheral IV upon OT entry to room.    General Precautions: Standard, fall, vision impaired  Orthopedic Precautions: N/A  Braces: N/A  Respiratory Status: Room air     Occupational Performance:    Bed Mobility:    MOD (I) WITH ALL LEVELS OF BED MOBILITY    Functional Mobility/Transfers:  Patient completed Sit <> Stand  "Transfer with modified independence  with  no assistive device   Patient completed Bed <> Chair Transfer using Stand Pivot technique with modified independence with no assistive device  Patient completed Toilet Transfer Stand Pivot technique with modified independence with  no AD  Functional Mobility: MOD (I) WITH STAND PIVOT T/F WITH NO AD.    Activities of Daily Living:  Upper Body Dressing: modified independence    Lower Body Dressing: modified independence    Toileting: modified independence WITH HYGIENE AND CLOTHING MANAGEMENT.        Cognitive/Visual Perceptual:  Cognitive/Psychosocial Skills:     -       Oriented to: Person, Place, Time, and Situation   -       Follows Commands/attention:Follows multistep  commands  -       Mood/Affect/Coping skills/emotional control: Appropriate to situation  Visual/Perceptual:      -Impaired  visual field WITH INABILITY TO PROPERLY  PERFORM FINGER COUNT TEST WITH (L) EYE PERIPHERALLY.    Physical Exam:  Balance:    -       NO LOB WITH STAND PIVOT NOR AMBULATION TO RESTROOM, HOWEVER, PATIENT EXPRESSED CONCERN OF (L) KNEE GIVING OUT AS PREMORBIDLY.  Upper Extremity Range of Motion:     -       Right Upper Extremity: WNL  -       Left Upper Extremity: Deficits: SLOW, DELIBERATE MOVEMENTS DUE TO PREVIOUS CVA    [facility] 6 Click ADL:  AMPA Total Score: 23    Treatment & Education:  OT EVAL PERFORMED.  PATIENT EDUCATED RE:  PURPOSE OF OT AND IMPORTANCE OF "CALL--DON'T FALL" TO DECREASE FALL RISK.  PATIENT PARTICIPATED IN [facility] MOBILITY AND SELF CARE TASKS.     Patient left supine with all lines intact, call button in reach, and FIANCE  present    GOALS:   Multidisciplinary Problems       Occupational Therapy Goals          Problem: Occupational Therapy    Goal Priority Disciplines Outcome Interventions   Occupational Therapy Goal     OT, PT/OT     Description: NO SKILLED OT INTERVENTION NEEDS IDENTIFIED.                         History:     Past Medical History:   Diagnosis Date    " Anxiety     Asthma     Breast cancer 2017    Cancer     right breast, metastatic-lymph nodes, bone, and thyroid     COPD (chronic obstructive pulmonary disease)     Depression     History of psychiatric hospitalization     2000; suicidal ideation    Hx of psychiatric care     Hypothyroidism     Miscarriage     five miscarriages    Obesity     Psychiatric problem     Thyroid cancer 2017         Past Surgical History:   Procedure Laterality Date    ABSCESS DRAINAGE      bilateral axilla    ADENOIDECTOMY      APPENDECTOMY      BREAST BIOPSY Right     x3    CHOLECYSTECTOMY      ENDOBRONCHIAL ULTRASOUND Bilateral 02/18/2021    Procedure: ENDOBRONCHIAL ULTRASOUND (EBUS);  Surgeon: Jaron Ni MD;  Location: [facility] ENDO;  Service: Pulmonary;  Laterality: Bilateral;    FLUOROSCOPY N/A 01/28/2021    Procedure: Mediport placement;  Surgeon: Noah Phelan MD;  Location: [facility] CATH LAB;  Service: General;  Laterality: N/A;    MASS EXCISION      MEDIPORT INSERTION, SINGLE Right 02/2021    SKIN GRAFT  06/16/2017    THYROIDECTOMY Bilateral     TONSILLECTOMY         Time Tracking:     OT Date of Treatment: 06/03/23  OT Start Time: 0916  OT Stop Time: 0936  OT Total Time (min): 20 min    Billable Minutes:Evaluation 10  Neuromuscular Re-education 10    6/3/2023

## 2023-06-03 NOTE — PT/OT/SLP EVAL
Physical Therapy Evaluation    Patient Name:  Josey Flores   MRN:  [id number]    Recommendations:     Discharge Recommendations: outpatient PT   Discharge Equipment Recommendations: none   Barriers to discharge: None    Assessment:     Josey Flores is a 52 y.o. female admitted with a medical diagnosis of TIA (transient ischemic attack).  She presents with the following impairments/functional limitations: weakness, impaired endurance, impaired functional mobility, gait instability, impaired balance, pain, decreased safety awareness, decreased lower extremity function, decreased coordination, impaired sensation.    Rehab Prognosis: Good; patient would benefit from acute skilled PT services to address these deficits and reach maximum level of function.    Recent Surgery: * No surgery found *     Plan:     During this hospitalization, patient to be seen 3 x/week to address the identified rehab impairments via gait training, therapeutic activities, therapeutic exercises, neuromuscular re-education and progress toward the following goals:    Plan of Care Expires:  06/17/23    Subjective     Chief Complaint: NONE, EAGER TO WALK, REQUEST TO USE BATHROOM  Patient/Family Comments/goals:   Pain/Comfort:  Pain Rating 1: 6/10  Location - Side 1: Bilateral  Location - Orientation 1: generalized  Location 1: chest (INTERMITTENT SHARP PAINS AROUND RIBS)  Pain Addressed 1: Reposition, Distraction    Patients cultural, spiritual, [employer]ec[employer]alism conflicts given the current situation:      Living Environment:  PT LIVES WITH FIANCE IN MOBILE HOME 4 STEPS TO ENTER WITH RAIL, PT AMB INDEP LIMITED COMMUNITY DISTANCES, DRIVES MINIMALLY, INDEP WITH ADL'S  Prior to admission, patients level of function was INDEP.  Equipment used at home: none.  DME owned (not currently used): none.  Upon discharge, patient will have assistance from LA[facility].    Objective:     Communicated with NURSE FERRIS prior to session.  Patient found supine with  "telemetry, peripheral IV  upon PT entry to room.    General Precautions: Standard, fall  Orthopedic Precautions:N/A   Braces: N/A  Respiratory Status: Room air    Exams:  Cognitive Exam:  Patient is oriented to Person, Place, Time, and Situation  Postural Exam:  Patient presented with the following abnormalities:    -       Rounded shoulders  Sensation:    -       Impaired  C/O NUMBNESS TO B FEET AT BASELINE, NEW C/O TINGLING IN L LEG  RLE ROM: WNL  RLE Strength: GROSSLY 5/5  LLE ROM: WFL  LLE Strength: GROSSLY 4-/5    Functional Mobility:  Bed Mobility:     Rolling Left:  modified independence  Rolling Right: modified independence  Scooting: modified independence  Supine to Sit: modified independence  Transfers:     Sit to Stand:  supervision with no AD  Bed to Chair: supervision with  no AD  using  Step Transfer  Toilet Transfer: independence with  no AD  using  Step Transfer  Gait: PT ' NO AD WITH SPV, NO LOB OR SOB ON ROOM AIR  Balance: GOOD SITTING AND STANDING BALANCE    AM-PAC 6 CLICK MOBILITY  Total Score:21     Treatment & Education:  PT EDUCATED IN ROLE OF P.T. AND POC IN ACUTE CARE HOSPITAL SETTING, PT ENCOURAGED TO INCREASE TIME OOB IN CHAIR TO TOLERANCE, EDUCATED IN AND ENCOURAGED TO PERFORM BLE THEREX WHILE SEATED OR SUPINE THROUGHOUT THE DAY TO TOLERANCE: HIP FLEX/EXT, QUAD SET, LAQ, HEEL SLIDES, AP'S.  PT AGREEABLE TO REQUESTS  PT EDUCATED ON RISK FOR FALLS DUE TO GENERALIZED WEAKNESS, EDUCATED ON "CALL DON'T FALL", ENCOURAGED TO CALL FOR ASSISTANCE WITH ALL NEEDS SUCH AS BED<>CHAIR TRANSFERS OR TRIPS TO BATHROOM, PT AGREEABLE TO SAFETY PRECAUTIONS    Patient left up in chair with all lines intact, call button in reach, NURSE notified, and FIANCE present.    GOALS:   Multidisciplinary Problems       Physical Therapy Goals          Problem: Physical Therapy    Goal Priority Disciplines Outcome Goal Variances Interventions   Physical Therapy Goal     PT, PT/OT      Description: LTG'S TO BE MET " IN 14 DAYS (6-17-23)  PT WILL BE INDEP FOR BED MOBILITY  PT WILL BE INDEP FOR BED<>CHAIR TF'S  PT WILL  FEET NO AD EVELIA                         History:     Past Medical History:   Diagnosis Date    Anxiety     Asthma     Breast cancer 2017    Cancer     right breast, metastatic-lymph nodes, bone, and thyroid     COPD (chronic obstructive pulmonary disease)     Depression     History of psychiatric hospitalization     2000; suicidal ideation    Hx of psychiatric care     Hypothyroidism     Miscarriage     five miscarriages    Obesity     Psychiatric problem     Thyroid cancer 2017       Past Surgical History:   Procedure Laterality Date    ABSCESS DRAINAGE      bilateral axilla    ADENOIDECTOMY      APPENDECTOMY      BREAST BIOPSY Right     x3    CHOLECYSTECTOMY      ENDOBRONCHIAL ULTRASOUND Bilateral 02/18/2021    Procedure: ENDOBRONCHIAL ULTRASOUND (EBUS);  Surgeon: Jaron Ni MD;  Location: [facility] ENDO;  Service: Pulmonary;  Laterality: Bilateral;    FLUOROSCOPY N/A 01/28/2021    Procedure: Mediport placement;  Surgeon: Noah Phelan MD;  Location: [facility] CATH LAB;  Service: General;  Laterality: N/A;    MASS EXCISION      MEDIPORT INSERTION, SINGLE Right 02/2021    SKIN GRAFT  06/16/2017    THYROIDECTOMY Bilateral     TONSILLECTOMY         Time Tracking:     PT Received On: 06/03/23  PT Start Time: 0620     PT Stop Time: 0645  PT Total Time (min): 25 min     Billable Minutes: Evaluation 15 and Therapeutic Activity 10    06/03/2023

## 2023-06-03 NOTE — PLAN OF CARE
OT EVAL COMPLETE.  NO SKILLED OT INTERVENTION NEEDS IDENTIFIED.  PATIENT WITH RESIDUAL (L)  EYE DEFICIT FROM PREVIOUS CVA..  PATIENT (I) TO TRACK TO ALL 4 QUADRANTS BUT COULD NOT PROPERLY PERFORM FINGER COUNT WITH (L) PERIPHERAL VISION.     PATIENT PERFORMED FINGER TO NOSE TEST WELL (B)'LY BUT WITH SLOW, DELIBERATE MOVEMENTS TO HIT TARGET WITH (L) HAND.

## 2023-06-03 NOTE — PT/OT/SLP EVAL
"Speech Language Pathology Evaluation  Bedside Swallow    Patient Name:  Josey Flores   MRN:  [id number]  Admitting Diagnosis: TIA (transient ischemic attack)    Recommendations:                 General Recommendations:  Speech/language therapy  Diet recommendations:  Regular Diet - IDDSI Level 7, Thin liquids - IDDSI Level 0   Aspiration Precautions: Standard aspiration precautions   General Precautions: Standard,    Communication strategies:  none    Assessment:     Josey Flores is a 52 y.o. female with an SLP diagnosis of minimal dysarthria.  Pt able to communicate basic needs and ideas but with min dysarthria.  Pt reported that her speech is "different " since her admit.   Swallowing assessment completed with no swallowing difficulties with recommendations of Regular consistency diet and thin liquids.  Basic swallowing precautions reviewed.       History:     Past Medical History:   Diagnosis Date    Anxiety     Asthma     Breast cancer 2017    Cancer     right breast, metastatic-lymph nodes, bone, and thyroid     COPD (chronic obstructive pulmonary disease)     Depression     History of psychiatric hospitalization     2000; suicidal ideation    Hx of psychiatric care     Hypothyroidism     Miscarriage     five miscarriages    Obesity     Psychiatric problem     Thyroid cancer 2017       Past Surgical History:   Procedure Laterality Date    ABSCESS DRAINAGE      bilateral axilla    ADENOIDECTOMY      APPENDECTOMY      BREAST BIOPSY Right     x3    CHOLECYSTECTOMY      ENDOBRONCHIAL ULTRASOUND Bilateral 02/18/2021    Procedure: ENDOBRONCHIAL ULTRASOUND (EBUS);  Surgeon: Jaron Ni MD;  Location: [facility] ENDO;  Service: Pulmonary;  Laterality: Bilateral;    FLUOROSCOPY N/A 01/28/2021    Procedure: Mediport placement;  Surgeon: Noah Phelan MD;  Location: [facility] CATH LAB;  Service: General;  Laterality: N/A;    MASS EXCISION      MEDIPORT INSERTION, SINGLE Right 02/2021    SKIN GRAFT  06/16/2017    " THYROIDECTOMY Bilateral     TONSILLECTOMY         Social History: Patient lives with her boyfriend and is independent with all daily living skills..    Prior Intubation HX:  none reported     Modified Barium Swallow: none reported     Chest X-Rays: no acute changes reported in recent x-ray    Prior diet: Regular consistency diet; thin liquids. Pt reported that she has dentures at home but does not wear them and eats regular consistency foods without chewing difficulties.     Occupation/hobbies/homemaking: n/a.    Subjective     Pt agreeable to work with S.T.  Patient goals: to go home      Pain/Comfort:  Pain Rating 1: 7/10 (pt reported pain in left arm and that she was going to let nursing know when S.T. completed.)    Respiratory Status:  see medical chart    Objective:     Oral Musculature Evaluation  Oral Musculature: WFL  Dentition: upper dentures, lower dentures, other (see comments) (pt has dentures but reported that she does not have currently and that she is used to eating without them)  Mucosal Quality: good  Mandibular Strength and Mobility: WFL  Oral Labial Strength and Mobility: WFL    Bedside Swallow Eval:   Consistencies Assessed:  Thin liquids no coughing, delays or wet vocal quality  Puree no coughing, delays or wet vocal quality  Solids no coughing, delays or wet vocal quality      Oral Phase:   WFL    Pharyngeal Phase:   no overt clinical signs/symptoms of aspiration  no overt clinical signs/symptoms of pharyngeal dysphagia    Compensatory Strategies  None    Treatment: Pt followed basic commands and answered basic yes/no questions  Pt with no expressive language difficulties but with some noted dysarthria.  Continue S.T. POC    Goals:   Multidisciplinary Problems       SLP Goals          Problem: SLP    Goal Priority Disciplines Outcome   SLP Goal     SLP    Description: 1. Pt will communicate needs/ideas effectively.                       Plan:     Patient to be seen:  3 x/week   Plan of Care  expires:     Plan of Care reviewed with:  patient   SLP Follow-Up:  Yes       Discharge recommendations:    continued S.T. as appropriate  Barriers to Discharge:   n/a    Time Tracking:     SLP Treatment Date:   06/10/23  Speech Start Time:  0940  Speech Stop Time:  1000     Speech Total Time (min):  20 min    Billable Minutes: Total Time 20 minutes    06/03/2023

## 2023-06-03 NOTE — SUBJECTIVE & OBJECTIVE
Review of Systems   Constitutional: Negative.  Negative for chills and fever.   HENT: Negative.  Negative for congestion, rhinorrhea, sore throat and trouble swallowing.    Eyes: Negative.  Negative for visual disturbance.   Respiratory: Negative.  Negative for cough, shortness of breath and wheezing.    Cardiovascular: Negative.  Negative for chest pain and palpitations.   Gastrointestinal: Negative.  Negative for abdominal pain, diarrhea, nausea and vomiting.   Endocrine: Negative.    Genitourinary: Negative.  Negative for dysuria and flank pain.   Musculoskeletal: Negative.  Negative for back pain.   Skin: Negative.  Negative for rash.   Allergic/Immunologic: Negative.    Neurological:  Positive for weakness (left sided) and numbness (left sided). Negative for speech difficulty and headaches.   Hematological: Negative.    Psychiatric/Behavioral: Negative.  Negative for hallucinations. The patient is not nervous/anxious.    All other systems reviewed and are negative.  Objective:     Vital Signs (Most Recent):  Temp: 97.4 °F (36.3 °C) (06/03/23 0706)  Pulse: 71 (06/03/23 0900)  Resp: 18 (06/03/23 1006)  BP: (!) 101/55 (06/03/23 0706)  SpO2: 97 % (06/03/23 0706) Vital Signs (24h Range):  Temp:  [97.4 °F (36.3 °C)-98.8 °F (37.1 °C)] 97.4 °F (36.3 °C)  Pulse:  [71-90] 71  Resp:  [13-18] 18  SpO2:  [95 %-100 %] 97 %  BP: (101-139)/(51-75) 101/55     Weight: 97.3 kg (214 lb 6.4 oz)  Body mass index is 33.58 kg/m².  No intake or output data in the 24 hours ending 06/03/23 1107      Physical Exam  Vitals and nursing note reviewed.   Constitutional:       General: She is awake. She is not in acute distress.     Appearance: She is obese. She is not ill-appearing.   HENT:      Head: Normocephalic and atraumatic.      Mouth/Throat:      Mouth: Mucous membranes are moist.   Eyes:      General: No scleral icterus.     Conjunctiva/sclera: Conjunctivae normal.   Cardiovascular:      Rate and Rhythm: Normal rate and regular  rhythm.      Heart sounds: No murmur heard.  Pulmonary:      Effort: Pulmonary effort is normal. No respiratory distress.      Breath sounds: Normal breath sounds. No wheezing.   Abdominal:      Palpations: Abdomen is soft.      Tenderness: There is no abdominal tenderness.   Musculoskeletal:         General: No swelling. Normal range of motion.      Cervical back: Normal range of motion and neck supple.   Skin:     General: Skin is warm.      Coloration: Skin is not jaundiced.   Neurological:      Mental Status: She is alert and oriented to person, place, and time.      Motor: Weakness (Decreased motor strength left upper and lower extremity compared to right) present.   Psychiatric:         Attention and Perception: Attention normal.         Speech: Speech normal.         Behavior: Behavior is cooperative.           Significant Labs: All pertinent labs within the past 24 hours have been reviewed.    Significant Imaging: I have reviewed all pertinent imaging results/findings within the past 24 hours.

## 2023-06-04 VITALS
TEMPERATURE: 98 F | HEART RATE: 85 BPM | BODY MASS INDEX: 45.99 KG/M2 | OXYGEN SATURATION: 94 % | DIASTOLIC BLOOD PRESSURE: 58 MMHG | SYSTOLIC BLOOD PRESSURE: 113 MMHG | WEIGHT: 293 LBS | RESPIRATION RATE: 17 BRPM | HEIGHT: 67 IN

## 2023-06-04 LAB
ALBUMIN SERPL BCP-MCNC: 3.3 G/DL (ref 3.5–5.2)
ALP SERPL-CCNC: 68 U/L (ref 55–135)
ALT SERPL W/O P-5'-P-CCNC: 20 U/L (ref 10–44)
ANION GAP SERPL CALC-SCNC: 9 MMOL/L (ref 8–16)
AST SERPL-CCNC: 20 U/L (ref 10–40)
BASOPHILS NFR BLD: 0 % (ref 0–1.9)
BILIRUB SERPL-MCNC: 0.3 MG/DL (ref 0.1–1)
BUN SERPL-MCNC: 19 MG/DL (ref 6–20)
CALCIUM SERPL-MCNC: 8.7 MG/DL (ref 8.7–10.5)
CHLORIDE SERPL-SCNC: 105 MMOL/L (ref 95–110)
CO2 SERPL-SCNC: 25 MMOL/L (ref 23–29)
CREAT SERPL-MCNC: 1.4 MG/DL (ref 0.5–1.4)
DIFFERENTIAL METHOD: ABNORMAL
EOSINOPHIL NFR BLD: 0 % (ref 0–8)
ERYTHROCYTE [DISTWIDTH] IN BLOOD BY AUTOMATED COUNT: 13.6 % (ref 11.5–14.5)
EST. GFR  (NO RACE VARIABLE): 45 ML/MIN/1.73 M^2
GLUCOSE SERPL-MCNC: 113 MG/DL (ref 70–110)
HCT VFR BLD AUTO: 31 % (ref 37–48.5)
HGB BLD-MCNC: 10.5 G/DL (ref 12–16)
IMM GRANULOCYTES # BLD AUTO: ABNORMAL K/UL (ref 0–0.04)
IMM GRANULOCYTES NFR BLD AUTO: ABNORMAL % (ref 0–0.5)
LYMPHOCYTES NFR BLD: 33 % (ref 18–48)
MCH RBC QN AUTO: 37.6 PG (ref 27–31)
MCHC RBC AUTO-ENTMCNC: 33.9 G/DL (ref 32–36)
MCV RBC AUTO: 111 FL (ref 82–98)
MONOCYTES NFR BLD: 5 % (ref 4–15)
NEUTROPHILS NFR BLD: 62 % (ref 38–73)
NRBC BLD-RTO: 0 /100 WBC
PLATELET # BLD AUTO: 173 K/UL (ref 150–450)
PMV BLD AUTO: 11.6 FL (ref 9.2–12.9)
POTASSIUM SERPL-SCNC: 4.4 MMOL/L (ref 3.5–5.1)
PROT SERPL-MCNC: 6.1 G/DL (ref 6–8.4)
RBC # BLD AUTO: 2.79 M/UL (ref 4–5.4)
SODIUM SERPL-SCNC: 139 MMOL/L (ref 136–145)
WBC # BLD AUTO: 5.6 K/UL (ref 3.9–12.7)

## 2023-06-04 PROCEDURE — 80053 COMPREHEN METABOLIC PANEL: CPT | Performed by: STUDENT IN AN ORGANIZED HEALTH CARE EDUCATION/TRAINING PROGRAM

## 2023-06-04 PROCEDURE — 25000003 PHARM REV CODE 250: Performed by: INTERNAL MEDICINE

## 2023-06-04 PROCEDURE — 96361 HYDRATE IV INFUSION ADD-ON: CPT

## 2023-06-04 PROCEDURE — 63600175 PHARM REV CODE 636 W HCPCS: Performed by: INTERNAL MEDICINE

## 2023-06-04 PROCEDURE — 63600175 PHARM REV CODE 636 W HCPCS: Performed by: STUDENT IN AN ORGANIZED HEALTH CARE EDUCATION/TRAINING PROGRAM

## 2023-06-04 PROCEDURE — G0378 HOSPITAL OBSERVATION PER HR: HCPCS

## 2023-06-04 PROCEDURE — 85027 COMPLETE CBC AUTOMATED: CPT | Performed by: STUDENT IN AN ORGANIZED HEALTH CARE EDUCATION/TRAINING PROGRAM

## 2023-06-04 PROCEDURE — 96376 TX/PRO/DX INJ SAME DRUG ADON: CPT

## 2023-06-04 PROCEDURE — 36415 COLL VENOUS BLD VENIPUNCTURE: CPT | Performed by: STUDENT IN AN ORGANIZED HEALTH CARE EDUCATION/TRAINING PROGRAM

## 2023-06-04 PROCEDURE — 25000003 PHARM REV CODE 250: Performed by: STUDENT IN AN ORGANIZED HEALTH CARE EDUCATION/TRAINING PROGRAM

## 2023-06-04 PROCEDURE — 85007 BL SMEAR W/DIFF WBC COUNT: CPT | Mod: NCS | Performed by: STUDENT IN AN ORGANIZED HEALTH CARE EDUCATION/TRAINING PROGRAM

## 2023-06-04 RX ORDER — CLOPIDOGREL BISULFATE 75 MG/1
75 TABLET ORAL DAILY
Qty: 30 TABLET | Refills: 11 | Status: SHIPPED | OUTPATIENT
Start: 2023-06-05 | End: 2024-06-04

## 2023-06-04 RX ORDER — HEPARIN 100 UNIT/ML
100 SYRINGE INTRAVENOUS ONCE
Status: COMPLETED | OUTPATIENT
Start: 2023-06-04 | End: 2023-06-04

## 2023-06-04 RX ORDER — ATORVASTATIN CALCIUM 40 MG/1
40 TABLET, FILM COATED ORAL DAILY
Qty: 90 TABLET | Refills: 3 | Status: SHIPPED | OUTPATIENT
Start: 2023-06-05 | End: 2024-06-04

## 2023-06-04 RX ADMIN — MORPHINE SULFATE 4 MG: 4 INJECTION INTRAVENOUS at 12:06

## 2023-06-04 RX ADMIN — HEPARIN 100 UNITS: 100 SYRINGE at 02:06

## 2023-06-04 RX ADMIN — LEVOTHYROXINE SODIUM 275 MCG: 150 TABLET ORAL at 05:06

## 2023-06-04 RX ADMIN — SODIUM CHLORIDE, POTASSIUM CHLORIDE, SODIUM LACTATE AND CALCIUM CHLORIDE: 600; 310; 30; 20 INJECTION, SOLUTION INTRAVENOUS at 10:06

## 2023-06-04 RX ADMIN — CLOPIDOGREL BISULFATE 75 MG: 75 TABLET ORAL at 09:06

## 2023-06-04 RX ADMIN — ATORVASTATIN CALCIUM 40 MG: 40 TABLET, FILM COATED ORAL at 09:06

## 2023-06-04 NOTE — DISCHARGE SUMMARY
Formerly [facility] ([facility] Medicine  Discharge Summary      Patient Name: Josey Flores  MRN: [id number]  PAUL: [id number]  Patient Class: OP- Observation  Admission Date: 6/2/2023  Hospital Length of Stay: 0 days  Discharge Date and Time:  06/04/2023 2:08 PM  Attending Physician: Raoul Panchal MD   Discharging Provider: Raoul Panchal MD  Primary Care Provider: Kishore Persaud MD    Primary Care Team: Networked reference to record PCT     HPI:   Ms. Flores is a 52-year-old  female with PMH significant for metastatic breast cancer, currently in remission, followed by Dr. Joseph, COPD, asthma, hypothyroidism, presented to the ED complaining of 2-3 dose of left-sided numbness/weakness, but did not seek medical attention.  She also reports chest discomfort, radiating to left upper arm earlier today.  She has fallen couple of times over the past three days.  No prior known history of CAD or CVA.  In the ED laboratory workup is unremarkable.  CT head is negative for acute intracranial pathology.  Troponin within normal limits, EKG with no ST or T-wave changes.  Placed in observation for TIA workup.         Hospital Course:   Admitted for CVA, initial left-sided hemiparesis which is slowly improving since admission.     MRI brain shows: Multiple right frontal and parietal territory acute infarcts, scattered, some cortical and some non cortical possibly embolic type infarcts. Moderate microvascular ischemic change. No mass lesion, acute hemorrhage.     Continue Plavix, statin. PT/OT to eval and treat recs outpatient therapy       Goals of Care Treatment Preferences:  Code Status: Full Code    Health care agent: sister Soriano, [phone]  Health care agent number: No value filed.    Living Will: Yes  LaPOST: Yes  What is most important right now is to focus on remaining as independent as possible, symptom/pain control, curative/life-prolongation (regardless of treatment burdens).   Spoke to [facility] in pharmacy to release Gentamicin order. Accordingly, we have decided that the best plan to meet the patient's goals includes continuing with treatment.      Consults:   Consults (From admission, onward)        Status Ordering Provider     IP consult to case management/social work  Once        Provider:  (Not yet assigned)    Acknowledged EDUARDO NGUYEN          Neuro  * TIA (transient ischemic attack)    Antithrombotics for secondary stroke prevention: Antiplatelets: Clopidogrel: 75 mg daily    Statins for secondary stroke prevention and hyperlipidemia, if present:   Statins: Atorvastatin- 40 mg daily    Aggressive risk factor modification: HTN, Smoking     Rehab efforts: The patient has been evaluated by a stroke team provider and the therapy needs have been fully considered based off the presenting complaints and exam findings. The following therapy evaluations are needed: PT evaluate and treat, OT evaluate and treat, SLP evaluate and treat    Diagnostics ordered/pending: Carotid ultrasound to assess vasculature, CT scan of head without contrast to asses brain parenchyma, HgbA1C to assess blood glucose levels, Lipid Profile to assess cholesterol levels, MRA head to assess vasculature, MRI head without contrast to assess brain parenchyma, TTE to assess cardiac function/status , TSH to assess thyroid function    VTE prophylaxis: Enoxaparin 40 mg SQ every 24 hours    BP parameters: TIA: SBP <220 until imaging confirmation of no infarct         Oncology  Primary breast cancer with metastasis to other site  -history of breast cancer, currently in remission.   -last chemo six months ago.    -followed by Dr. Joseph      Endocrine  Hypothyroidism  -synthroid      Other  Tobacco abuse disorder  -nicotine patch  -counseled on smoking cessation 5min  -Ambulatory referral sent        Final Active Diagnoses:    Diagnosis Date Noted POA    PRINCIPAL PROBLEM:  TIA (transient ischemic attack) [G45.9] 06/02/2023 Yes    Hypothyroidism [E03.9] 05/18/2021 Yes    Primary  breast cancer with metastasis to other site [C50.919] 05/18/2021 Yes    Tobacco abuse disorder [Z72.0] 08/10/2017 Yes      Problems Resolved During this Admission:       Discharged Condition: stable    Disposition: Home or Self Care    Follow Up:   Follow-up Information     Kishore Persaud MD Follow up in 1 week(s).    Specialty: Family Medicine  Contact information:  [address] [facility]  [place] [address]  [phone]             Estefani Asencio MD. Schedule an appointment as soon as possible for a visit in 2 day(s).    Specialty: Hematology and Oncology  Contact information:  [address]  [place] [address] [phone]                       Patient Instructions:      Ambulatory referral/consult to Physical/Occupational Therapy   Standing Status: Future   Referral Priority: Routine Referral Type: Physical Medicine   Referral Reason: Specialty Services Required   Number of Visits Requested: 1     Ambulatory referral/consult to Smoking Cessation Program   Standing Status: Future   Referral Priority: Routine Referral Type: Consultation   Referral Reason: Specialty Services Required   Requested Specialty: CTTS   Number of Visits Requested: 1     Diet Cardiac     Notify your health care provider if you experience any of the following:  persistent dizziness, light-headedness, or visual disturbances     Notify your health care provider if you experience any of the following:  increased confusion or weakness     Activity as tolerated       Significant Diagnostic Studies: Labs: All labs within the past 24 hours have been reviewed    Pending Diagnostic Studies:     Procedure Component Value Units Date/Time    CBC auto differential [[id number]] Collected: 06/04/23 0457    Order Status: Sent Lab Status: In process Updated: 06/04/23 0457    Specimen: Blood     Comprehensive metabolic panel [[id number]] Collected: 06/04/23 0457    Order Status: Sent Lab Status: In process Updated: 06/04/23 0457     Specimen: Blood     Troponin I [[id number]]     Order Status: Sent Lab Status: No result     Specimen: Blood          Medications:  Reconciled Home Medications:      Medication List      START taking these medications    atorvastatin 40 MG tablet  Commonly known as: LIPITOR  Take 1 tablet (40 mg total) by mouth once daily.  Start taking on: June 5, 2023     clopidogreL 75 mg tablet  Commonly known as: PLAVIX  Take 1 tablet (75 mg total) by mouth once daily.  Start taking on: June 5, 2023        CONTINUE taking these medications    albuterol 90 mcg/actuation inhaler  Commonly known as: PROVENTIL/VENTOLIN HFA  Inhale 2 puffs into the lungs every 4 (four) hours as needed for Wheezing or Shortness of Breath.     ALPRAZolam 1 MG tablet  Commonly known as: XANAX  Take 0.5-1 tablets (0.5-1 mg total) by mouth 2 (two) times daily as needed for Anxiety.     buPROPion 150 MG TBSR 12 hr tablet  Commonly known as: WELLBUTRIN SR  Take 1 tablet (150 mg total) by mouth 2 (two) times daily. Take 1 tablet (150mg) once daily for 1 week then increase to twice daily     calcium carbonate 400 mg calcium (1,000 mg) Chew  Take 2,000 mg by mouth once daily.     cyanocobalamin 1000 MCG tablet  Commonly known as: VITAMIN B-12  Take 1 tablet (1,000 mcg total) by mouth once daily.     diphenoxylate-atropine 2.5-0.025 mg 2.5-0.025 mg per tablet  Commonly known as: LomotiL  Take 1 tablet by mouth 4 (four) times daily as needed for Diarrhea.     DULoxetine 30 MG capsule  Commonly known as: CYMBALTA  Take 1 capsule (30 mg total) by mouth once daily. It may take up to 2 to 6 weeks to see full effect of Cymbalta.     hydrOXYzine HCL 25 MG tablet  Commonly known as: ATARAX  Take 1 tablet (25 mg total) by mouth 3 (three) times daily as needed for Itching.     IBRANCE 125 mg Cap  Generic drug: palbociclib  Take one capsule (125 mg) by mouth once daily on days 1-21 of each 28-day cycle.     ipratropium 42 mcg (0.06 %) nasal spray  Commonly known as:  ATROVENT  2 sprays by Nasal route 4 (four) times daily. As needed for rhinitis. Take in evening before bed and in the morning     letrozole 2.5 mg Tab  Commonly known as: FEMARA  Take 1 tablet (2.5 mg total) by mouth once daily.     * levothyroxine 200 MCG tablet  Commonly known as: SYNTHROID  Take 1 tablet (200 mcg total) by mouth once daily.     * levothyroxine 75 MCG tablet  Commonly known as: SYNTHROID  Take 1 tablet (75 mcg total) by mouth before breakfast.     morphine 15 MG 12 hr tablet  Commonly known as: MS CONTIN  Take 1 tablet (15 mg total) by mouth 2 (two) times daily.     multivitamin per tablet  Commonly known as: THERAGRAN  Take 1 tablet by mouth once daily.     naloxone 4 mg/actuation Spry  Commonly known as: NARCAN  SMARTSIG:Both Nares     pentoxifylline 400 mg Tbsr  Commonly known as: TRENTAL  Take 1 tablet (400 mg total) by mouth 2 (two) times a day.     potassium chloride SA 20 MEQ tablet  Commonly known as: K-DUR,KLOR-CON  Take 1 tablet (20 mEq total) by mouth once daily. Take daily for 3 days.     VITAMIN D2 50,000 unit Cap  Generic drug: ergocalciferol  Take 5,000 Units by mouth once daily at 6am.     vitamin E 1000 UNIT capsule  Take 1 capsule (1,000 Units total) by mouth once daily.         * This list has 2 medication(s) that are the same as other medications prescribed for you. Read the directions carefully, and ask your doctor or other care provider to review them with you.            STOP taking these medications    HYDROcodone-acetaminophen  mg per tablet  Commonly known as: NORCO            Indwelling Lines/Drains at time of discharge:   Lines/Drains/Airways     Central Venous Catheter Line  Duration                PowerPort A Cath Single Lumen 01/28/21 1415 right internal jugular 856 days                Time spent on the discharge of patient: 40 minutes         Raoul Panchal MD  Department of Hospital Medicine  [facility] ([facility])

## 2023-06-04 NOTE — NURSING
Discharge summary, health teachings and instructions given to patient with spouse at bedside--verbalized understanding. Right chest port flushed with Heparin and needle removed--no complications noted. Tele monitor removed and returned to the monitor room. All questions answered with regards to discharge instructions. Reminded of the importance of follow-up appointments. I

## 2023-06-04 NOTE — PLAN OF CARE
[facility] - Tele[facility] (Hospital)  Discharge Final Note    Primary Care Provider: Kishore Persaud MD    Expected Discharge Date: 6/4/2023    Final Discharge Note (most recent)       Final Note - 06/04/23 1409          Final Note    Assessment Type Final Discharge Note     Anticipated Discharge Disposition Home or Self Care        Post-Acute Status    Discharge Delays None known at this time                     Important Message from Medicare             Contact Info       Kishore Persaud MD   Specialty: Family Medicine   Relationship: PCP - [address] [address]   Phone: [phone]       Next Steps: Follow up in 1 week(s)    Estefani Asencio MD   Specialty: Hematology and Oncology   Relationship: PCP - Hematology/Oncology  Consulting Physician    [address] [place] [id number]   Phone: [phone]       Next Steps: Schedule an appointment as soon as possible for a visit in 2 day(s)          DC Disposition: home with family  Family Notified: Patient by nurse  Transportation: Personal transportation    Patient had no equipment or placement needs from .     SW was unable to schedule hospital follow up with PCP, due to no appointment available.

## 2023-06-04 NOTE — PLAN OF CARE
[facility] (H[facility])  Discharge Assessment    Primary Care Provider: Kishore Persaud MD     Discharge Assessment (most recent)       BRIEF DISCHARGE ASSESSMENT - 06/04/23 6357          Discharge Planning    Assessment Type Discharge Planning Brief Assessment     Resource/Environmental Concerns none     Support Systems Spouse/significant other     Equipment Currently Used at Home none     Current Living Arrangements home   mobile home with 4 steps w/ rail to enter    Patient/Family Anticipates Transition to home     Patient/Family Anticipated Services at Transition none     DME Needed Upon Discharge  none     Discharge Plan A Home with family                     Patient lives with yi in mobile home. Mostly independent with ADLs and does still drive, minimally.   Patient has no DME equipment used at home.  Patient will have help from ance once DC home.

## 2023-06-05 ENCOUNTER — TELEPHONE (OUTPATIENT)
Dept: PALLIATIVE MEDICINE | Facility: CLINIC | Age: 53
End: 2023-06-05
Payer: MEDICAID

## 2023-06-05 NOTE — TELEPHONE ENCOUNTER
"Nurse returned call to pt she stated " she was in the hospital last week for Lt side weakness, later after MRI ready pt suffered a stroke on her lt side, she said her B/P was evaluated upon arrival, later it was dropping a little to low, pt stated the hospital MD stopped her [place] and kept her on her morphine, she stated she asked him not to take her off the norco because it helps her cancer related pain better, pt stated ER doc told her its best to stop the norco and keep morphine " pt waned to know why ? And could he do this with out palliative involved, I explained to pt, when ever hospital ER visis are involved, the providing doc has the authority at that present time to take full responsibility for pt's care and review meds to ensure what need to be added or taken away. Pt stated she was not aware this could be. I explained to ms garcia the importance of s/p post hosp fu apt with in 7-10 days with their regular providers. She understands this process now, she requested to be seen sooner than later in palliative so EB can review her pain meds again. Pt was r/s for a sooner apt. She is aware palliative will remain available when needed. Pt stated she is feeling much better now, no complaints of any s/s of discomfort.   "

## 2023-06-05 NOTE — TELEPHONE ENCOUNTER
----- Message from Bryan Sands sent at 6/5/2023 10:05 AM CDT -----  Contact: [phone]  Patient would like to consult with a nurse in regards to her medications. Please call to advise at [phone]. Thanks

## 2023-06-09 ENCOUNTER — PATIENT OUTREACH (OUTPATIENT)
Dept: ADMINISTRATIVE | Facility: HOSPITAL | Age: 53
End: 2023-06-09
Payer: MEDICAID

## 2023-06-09 NOTE — PROGRESS NOTES
Called patient to confirm hospital follow up scheduled on 6/12/2023.   Patient is confirmed. Encouraged patient to bring all medications and BP cuff to follow up visit.     Pt will bring home bp machine.

## 2023-06-12 ENCOUNTER — OFFICE VISIT (OUTPATIENT)
Dept: INTERNAL MEDICINE | Facility: CLINIC | Age: 53
End: 2023-06-12
Payer: MEDICAID

## 2023-06-12 VITALS
DIASTOLIC BLOOD PRESSURE: 64 MMHG | OXYGEN SATURATION: 97 % | HEART RATE: 96 BPM | HEIGHT: 67 IN | TEMPERATURE: 98 F | SYSTOLIC BLOOD PRESSURE: 102 MMHG | BODY MASS INDEX: 34.08 KG/M2 | WEIGHT: 217.13 LBS

## 2023-06-12 DIAGNOSIS — C77.3 MALIGNANT NEOPLASM METASTATIC TO LYMPH NODE OF AXILLA: ICD-10-CM

## 2023-06-12 DIAGNOSIS — I74.9 EMBOLIC INFARCTION: ICD-10-CM

## 2023-06-12 DIAGNOSIS — G45.9 TIA (TRANSIENT ISCHEMIC ATTACK): ICD-10-CM

## 2023-06-12 DIAGNOSIS — Z09 HOSPITAL DISCHARGE FOLLOW-UP: Primary | ICD-10-CM

## 2023-06-12 DIAGNOSIS — E66.9 OBESITY (BMI 30-39.9): ICD-10-CM

## 2023-06-12 DIAGNOSIS — E03.9 HYPOTHYROIDISM, UNSPECIFIED TYPE: ICD-10-CM

## 2023-06-12 DIAGNOSIS — R26.9 GAIT ABNORMALITY: ICD-10-CM

## 2023-06-12 DIAGNOSIS — R53.1 LEFT-SIDED WEAKNESS: ICD-10-CM

## 2023-06-12 DIAGNOSIS — I63.9 CEREBROVASCULAR ACCIDENT (CVA), UNSPECIFIED MECHANISM: ICD-10-CM

## 2023-06-12 DIAGNOSIS — Z72.0 TOBACCO ABUSE DISORDER: ICD-10-CM

## 2023-06-12 PROCEDURE — 3078F PR MOST RECENT DIASTOLIC BLOOD PRESSURE < 80 MM HG: ICD-10-PCS | Mod: CPTII,,,

## 2023-06-12 PROCEDURE — 3078F DIAST BP <80 MM HG: CPT | Mod: CPTII,,,

## 2023-06-12 PROCEDURE — 99214 PR OFFICE/OUTPT VISIT, EST, LEVL IV, 30-39 MIN: ICD-10-PCS | Mod: S$PBB,,,

## 2023-06-12 PROCEDURE — 3044F HG A1C LEVEL LT 7.0%: CPT | Mod: CPTII,,,

## 2023-06-12 PROCEDURE — 1160F PR REVIEW ALL MEDS BY PRESCRIBER/CLIN PHARMACIST DOCUMENTED: ICD-10-PCS | Mod: CPTII,,,

## 2023-06-12 PROCEDURE — 3074F SYST BP LT 130 MM HG: CPT | Mod: CPTII,,,

## 2023-06-12 PROCEDURE — 99999 PR PBB SHADOW E&M-EST. PATIENT-LVL V: CPT | Mod: PBBFAC,,,

## 2023-06-12 PROCEDURE — 3008F PR BODY MASS INDEX (BMI) DOCUMENTED: ICD-10-PCS | Mod: CPTII,,,

## 2023-06-12 PROCEDURE — 1159F PR MEDICATION LIST DOCUMENTED IN MEDICAL RECORD: ICD-10-PCS | Mod: CPTII,,,

## 2023-06-12 PROCEDURE — 3074F PR MOST RECENT SYSTOLIC BLOOD PRESSURE < 130 MM HG: ICD-10-PCS | Mod: CPTII,,,

## 2023-06-12 PROCEDURE — 99215 OFFICE O/P EST HI 40 MIN: CPT | Mod: PBBFAC

## 2023-06-12 PROCEDURE — 1160F RVW MEDS BY RX/DR IN RCRD: CPT | Mod: CPTII,,,

## 2023-06-12 PROCEDURE — 1159F MED LIST DOCD IN RCRD: CPT | Mod: CPTII,,,

## 2023-06-12 PROCEDURE — 3008F BODY MASS INDEX DOCD: CPT | Mod: CPTII,,,

## 2023-06-12 PROCEDURE — 3044F PR MOST RECENT HEMOGLOBIN A1C LEVEL <7.0%: ICD-10-PCS | Mod: CPTII,,,

## 2023-06-12 PROCEDURE — 99214 OFFICE O/P EST MOD 30 MIN: CPT | Mod: S$PBB,,,

## 2023-06-12 PROCEDURE — 99999 PR PBB SHADOW E&M-EST. PATIENT-LVL V: ICD-10-PCS | Mod: PBBFAC,,,

## 2023-06-12 NOTE — PROGRESS NOTES
Josey Flores  06/12/2023  [id number]    Kishore Persaud MD  Patient Care Team:  Kishore Persaud MD as PCP - General (Family Medicine)  Estefani Asencio MD as PCP - Hematology/Oncology (Hematology and Oncology)  Michael Kidd LMSW as   Estefani Asencio MD as Consulting Physician (Hematology and Oncology)  Rita Zamora, RN as Oncology Navigator  Delicia Ferguson LCSW as   Kalpana Ca LCSW as  (Hematology and Oncology)  Telma Kumar NP (Inactive) as Nurse Practitioner (Palliative Medicine)  Kyara Jimenez PharmD as Pharmacist (Pharmacist)          Visit Type:a scheduled visit following a recent hospitalization    Chief Complaint:  Chief Complaint   Patient presents with    Hospital Follow Up        History of Present Illness:    Ms. Flores is a 52-year-old  female with PMH significant for metastatic breast cancer, currently in remission, followed by Dr. Joseph, COPD, asthma, hypothyroidism, presented to the ED complaining of 2-3 dose of left-sided numbness/weakness, but did not seek medical attention.  She also reports chest discomfort, radiating to left upper arm earlier today.  She has fallen couple of times over the past three days.  No prior known history of CAD or CVA.  In the ED laboratory workup is unremarkable.  CT head is negative for acute intracranial pathology.  Troponin within normal limits, EKG with no ST or T-wave changes.  Placed in observation for TIA workup.           Hospital Course:   Admitted for CVA, initial left-sided hemiparesis which is slowly improving since admission.      MRI brain shows: Multiple right frontal and parietal territory acute infarcts, scattered, some cortical and some non cortical possibly embolic type infarcts. Moderate microvascular ischemic change. No mass lesion, acute hemorrhage.      Continue Plavix, statin. PT/OT to eval and treat recs outpatient therapy    MRI brain showed  Right frontal and  parietal territory acute infarcts as above.  These may be embolic type infarcts.    Carotid US  No evidence of a hemodynamically significant carotid bifurcation stenosis      Transitional Care Note    Family and/or Caretaker present at visit?  Yes.  Diagnostic tests reviewed/disposition: I have reviewed all completed as well as pending diagnostic tests at the time of discharge.  Disease/illness education:   Home health/community services discussion/referrals: Patient does not have home health established from hospital visit.  They do not need home health.  If needed, we will set up home health for the patient.   Establishment or re-establishment of referral orders for community resources: No other necessary community resources.   Discussion with other health care providers: No discussion with other health care providers necessary.          History:  Past Medical History:   Diagnosis Date    Anxiety     Asthma     Breast cancer 2017    Cancer     right breast, metastatic-lymph nodes, bone, and thyroid     COPD (chronic obstructive pulmonary disease)     Depression     History of psychiatric hospitalization     2000; suicidal ideation    Hx of psychiatric care     Hypothyroidism     Miscarriage     five miscarriages    Obesity     Psychiatric problem     Thyroid cancer 2017     Past Surgical History:   Procedure Laterality Date    ABSCESS DRAINAGE      bilateral axilla    ADENOIDECTOMY      APPENDECTOMY      BREAST BIOPSY Right     x3    CHOLECYSTECTOMY      ENDOBRONCHIAL ULTRASOUND Bilateral 02/18/2021    Procedure: ENDOBRONCHIAL ULTRASOUND (EBUS);  Surgeon: Jaron Ni MD;  Location: [facility] ENDO;  Service: Pulmonary;  Laterality: Bilateral;    FLUOROSCOPY N/A 01/28/2021    Procedure: Mediport placement;  Surgeon: Noah Phelan MD;  Location: [facility] CATH LAB;  Service: General;  Laterality: N/A;    MASS EXCISION      MEDIPORT INSERTION, SINGLE Right 02/2021    SKIN GRAFT  06/16/2017    THYROIDECTOMY Bilateral      TONSILLECTOMY       Family History   Problem Relation Age of Onset    Arthritis Mother     Early death Mother         64 at time of death    Heart attack Mother     Heart disease Mother     Heart failure Mother     Hypertension Mother     Coronary artery disease Father     Hearing loss Father     Heart disease Father     Hyperlipidemia Father     Hypertension Father     Mental illness Father     Learning disabilities Son     Cancer Maternal Aunt      Social History     Socioeconomic History    Marital status:     Number of children: 2   Tobacco Use    Smoking status: Every Day     Packs/day: 1.00     Years: 20.00     Pack years: 20.00     Types: Cigarettes     Start date: 1/1/1985    Smokeless tobacco: Never    Tobacco comments:     45 pack year history   Substance and Sexual Activity    Alcohol use: No    Drug use: No    Sexual activity: Not Currently     Partners: Male     Birth control/protection: None   Social History Narrative     with two adult children; common law marriage (10+years); 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, 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     Patient Active Problem List   Diagnosis    Post-menopausal bleeding    Malignant neoplasm of overlapping sites of right breast in female, estrogen receptor positive    Anxiety    Malignant neoplasm metastatic to lymph node of axilla    Secondary cancer of bone    COPD with asthma    Tobacco abuse disorder    Papillary thyroid carcinoma    Neoplasm related pain    Edema extremities    Psychophysiological insomnia    Branch retinal vein occlusion of left eye with macular edema    Loss of vision, left eye    HSIL (high grade squamous intraepithelial lesion) on Pap smear of cervix    ACP (advance care planning)    Urinary tract infection with hematuria    Malignant neoplasm of endocervix    Abnormal PET scan, lung    Chemotherapy-induced nausea    Hydradenitis    Elevated troponin    Hypokalemia    Hypothyroidism    Primary breast cancer with metastasis to other  site    Lower abdominal pain    Acute suppurative otitis media of right ear without spontaneous rupture of tympanic membrane    Otalgia of left ear    Recurrent acute suppurative otitis media    Pruritus    Primary osteoarthritis of both knees    Chronic pain of both knees    Pancytopenia    Class 1 obesity due to excess calories with serious comorbidity and body mass index (BMI) of 31.0 to 31.9 in adult    TIA (transient ischemic attack)     Review of patient's allergies indicates:   Allergen Reactions    Gabapentin Swelling     Note: unilateral joint edema, unclear if due to gabapentin    Nsaids (non-steroidal anti-inflammatory drug) Other (See Comments)     Instructed not to take NSAID drugs with chemo pill.    Clindamycin Rash    Vancomycin analogues Itching       The following were reviewed at this visit: active problem list, medication list, allergies, family history, social history, and health maintenance.    Medications:  Current Outpatient Medications on File Prior to Visit   Medication Sig Dispense Refill    albuterol (PROVENTIL/VENTOLIN HFA) 90 mcg/actuation inhaler Inhale 2 puffs into the lungs every 4 (four) hours as needed for Wheezing or Shortness of Breath. 18 g 11    ALPRAZolam (XANAX) 1 MG tablet Take 0.5-1 tablets (0.5-1 mg total) by mouth 2 (two) times daily as needed for Anxiety. 60 tablet 0    atorvastatin (LIPITOR) 40 MG tablet Take 1 tablet (40 mg total) by mouth once daily. 90 tablet 3    buPROPion (WELLBUTRIN SR) 150 MG TBSR 12 hr tablet Take 1 tablet (150 mg total) by mouth 2 (two) times daily. Take 1 tablet (150mg) once daily for 1 week then increase to twice daily 60 tablet 0    calcium carbonate 400 mg calcium (1,000 mg) Chew Take 2,000 mg by mouth once daily.      clopidogreL (PLAVIX) 75 mg tablet Take 1 tablet (75 mg total) by mouth once daily. 30 tablet 11    cyanocobalamin (VITAMIN B-12) 1000 MCG tablet Take 1 tablet (1,000 mcg total) by mouth once daily. 90 tablet 3     diphenoxylate-atropine 2.5-0.025 mg (LOMOTIL) 2.5-0.025 mg per tablet Take 1 tablet by mouth 4 (four) times daily as needed for Diarrhea. 30 tablet 1    DULoxetine (CYMBALTA) 30 MG capsule Take 1 capsule (30 mg total) by mouth once daily. It may take up to 2 to 6 weeks to see full effect of Cymbalta. 30 capsule 0    ergocalciferol (VITAMIN D2) 50,000 unit Cap Take 5,000 Units by mouth once daily at 6am.       hydrOXYzine HCL (ATARAX) 25 MG tablet Take 1 tablet (25 mg total) by mouth 3 (three) times daily as needed for Itching. 90 tablet 1    ipratropium (ATROVENT) 42 mcg (0.06 %) nasal spray 2 sprays by Nasal route 4 (four) times daily. As needed for rhinitis. Take in evening before bed and in the morning 15 mL 11    letrozole (FEMARA) 2.5 mg Tab Take 1 tablet (2.5 mg total) by mouth once daily. 30 tablet 2    levothyroxine (SYNTHROID) 200 MCG tablet Take 1 tablet (200 mcg total) by mouth once daily. 30 tablet 11    levothyroxine (SYNTHROID) 75 MCG tablet Take 1 tablet (75 mcg total) by mouth before breakfast. 30 tablet 11    multivitamin (THERAGRAN) per tablet Take 1 tablet by mouth once daily.      naloxone (NARCAN) 4 mg/actuation Spry SMARTSIG:Both Nares      palbociclib (IBRANCE) 125 mg Cap Take one capsule (125 mg) by mouth once daily on days 1-21 of each 28-day cycle. 21 capsule 11    pentoxifylline (TRENTAL) 400 mg TbSR Take 1 tablet (400 mg total) by mouth 2 (two) times a day. 60 tablet 11    potassium chloride SA (K-DUR,KLOR-CON) 20 MEQ tablet Take 1 tablet (20 mEq total) by mouth once daily. Take daily for 3 days. 3 tablet 1    vitamin E 1000 UNIT capsule Take 1 capsule (1,000 Units total) by mouth once daily. 30 capsule 11    [DISCONTINUED] OLANZapine (ZYPREXA) 5 MG tablet Take 1 tablet (5 mg total) by mouth every evening. 30 tablet 2     No current facility-administered medications on file prior to visit.       Medications have been reviewed and reconciled with patient at this visit.  Barriers to  medications reviewed with patient.    Adverse reactions to current medications reviewed with patient..    Over the counter medications reviewed and reconciled with patient.    Exam:  Wt Readings from Last 3 Encounters:   06/04/23 (!) 143 kg (315 lb 4.1 oz)   05/03/23 92.8 kg (204 lb 9.4 oz)   04/13/23 100.2 kg (220 lb 14.4 oz)     Temp Readings from Last 3 Encounters:   06/04/23 97.6 °F (36.4 °C) (Oral)   05/03/23 97.4 °F (36.3 °C)   04/13/23 97.9 °F (36.6 °C)     BP Readings from Last 3 Encounters:   06/04/23 (!) 113/58   05/03/23 132/86   04/13/23 (!) 130/93     Pulse Readings from Last 3 Encounters:   06/04/23 85   05/03/23 88   04/13/23 90     There is no height or weight on file to calculate BMI.      Review of Systems   Cardiovascular:  Negative for chest pain and palpitations.   Neurological:  Positive for weakness. Negative for seizures and headaches.   Physical Exam  Vitals and nursing note reviewed.   Constitutional:       General: She is not in acute distress.     Appearance: She is well-developed. She is obese. She is not diaphoretic.   HENT:      Head: Normocephalic and atraumatic.      Right Ear: External ear normal.      Left Ear: External ear normal.   Eyes:      General:         Right eye: No discharge.         Left eye: No discharge.      Conjunctiva/sclera: Conjunctivae normal.      Pupils: Pupils are equal, round, and reactive to light.   Cardiovascular:      Rate and Rhythm: Normal rate and regular rhythm.      Heart sounds: Normal heart sounds. No murmur heard.  Pulmonary:      Effort: Pulmonary effort is normal. No respiratory distress.      Breath sounds: Normal breath sounds. No wheezing.   Musculoskeletal:      Comments: Left side weakness  Upper right extremity 5/5  Lower right extremity 5/5  Upper left extremity 2/5  Lower left extremity 3/5   Neurological:      Mental Status: She is alert and oriented to person, place, and time.      Cranial Nerves: Facial asymmetry present. No  dysarthria.      Motor: Weakness present.      Gait: Gait abnormal.      Comments: Left side facial drooping    Psychiatric:         Behavior: Behavior normal.         Thought Content: Thought content normal.         Judgment: Judgment normal.       Laboratory Reviewed ({Yes)  Lab Results   Component Value Date    WBC 5.60 06/04/2023    HGB 10.5 (L) 06/04/2023    HCT 31.0 (L) 06/04/2023     06/04/2023    CHOL 204 (H) 06/03/2023    TRIG 199 (H) 06/03/2023    HDL 33 (L) 06/03/2023    ALT 20 06/04/2023    AST 20 06/04/2023     06/04/2023    K 4.4 06/04/2023     06/04/2023    CREATININE 1.4 06/04/2023    BUN 19 06/04/2023    CO2 25 06/04/2023    TSH 47.998 (H) 06/03/2023    INR 1.1 06/03/2023    HGBA1C 5.1 06/03/2023       Josey was seen today for hospital follow up.    Diagnoses and all orders for this visit:    Hospital discharge follow-up    Embolic infarction  -     Ambulatory referral/consult to Neurology; Future  -     Ambulatory referral/consult to Outpatient Case Management    TIA (transient ischemic attack)    Tobacco abuse disorder    Malignant neoplasm metastatic to lymph node of axilla  -     Ambulatory referral/consult to Outpatient Case Management    Cerebrovascular accident (CVA), unspecified mechanism  -     WALKER FOR HOME USE  -     Ambulatory referral/consult to Neurology; Future  -     Ambulatory referral/consult to Outpatient Case Management    Left-sided weakness  -     WALKER FOR HOME USE    Gait abnormality  -     WALKER FOR HOME USE    Obesity (BMI 30-39.9)  Encouraged healthy diet and exercise as tolerated to help bring BMI into normal range.      Hypothyroidism, unspecified type      Pt has appt with smoking cessation and Neuro PT this week  Discussed importance of taking Plavix and Lipitor and working on smoking cessation    Oncology is managing synthroid doses  Will schedule a hospital follow up with her oncologist     Appt in 4 weeks with PCP     Care Plan/Goals:  Reviewed    Goals    None         Follow up: No follow-ups on file.    After visit summary was printed and given to patient upon discharge today.  Patient goals and care plan are included in After Visit Summary.

## 2023-06-13 ENCOUNTER — OFFICE VISIT (OUTPATIENT)
Dept: PALLIATIVE MEDICINE | Facility: CLINIC | Age: 53
End: 2023-06-13
Payer: MEDICAID

## 2023-06-13 VITALS
WEIGHT: 217.38 LBS | BODY MASS INDEX: 34.12 KG/M2 | SYSTOLIC BLOOD PRESSURE: 112 MMHG | HEART RATE: 87 BPM | HEIGHT: 67 IN | DIASTOLIC BLOOD PRESSURE: 71 MMHG | TEMPERATURE: 98 F

## 2023-06-13 DIAGNOSIS — G89.3 NEOPLASM RELATED PAIN: ICD-10-CM

## 2023-06-13 DIAGNOSIS — C79.81 METASTATIC MALIGNANT NEOPLASM TO BREAST: Primary | ICD-10-CM

## 2023-06-13 DIAGNOSIS — F41.9 ANXIETY: ICD-10-CM

## 2023-06-13 DIAGNOSIS — Z51.5 PALLIATIVE CARE ENCOUNTER: ICD-10-CM

## 2023-06-13 DIAGNOSIS — F32.A ANXIETY AND DEPRESSION: ICD-10-CM

## 2023-06-13 DIAGNOSIS — F41.9 ANXIETY AND DEPRESSION: ICD-10-CM

## 2023-06-13 PROCEDURE — 3008F PR BODY MASS INDEX (BMI) DOCUMENTED: ICD-10-PCS | Mod: CPTII,,,

## 2023-06-13 PROCEDURE — 3044F HG A1C LEVEL LT 7.0%: CPT | Mod: CPTII,,,

## 2023-06-13 PROCEDURE — 3078F PR MOST RECENT DIASTOLIC BLOOD PRESSURE < 80 MM HG: ICD-10-PCS | Mod: CPTII,,,

## 2023-06-13 PROCEDURE — 99999 PR PBB SHADOW E&M-EST. PATIENT-LVL IV: ICD-10-PCS | Mod: PBBFAC,,,

## 2023-06-13 PROCEDURE — 1159F MED LIST DOCD IN RCRD: CPT | Mod: CPTII,,,

## 2023-06-13 PROCEDURE — 3074F PR MOST RECENT SYSTOLIC BLOOD PRESSURE < 130 MM HG: ICD-10-PCS | Mod: CPTII,,,

## 2023-06-13 PROCEDURE — 3078F DIAST BP <80 MM HG: CPT | Mod: CPTII,,,

## 2023-06-13 PROCEDURE — 3008F BODY MASS INDEX DOCD: CPT | Mod: CPTII,,,

## 2023-06-13 PROCEDURE — 99215 OFFICE O/P EST HI 40 MIN: CPT | Mod: S$PBB,,,

## 2023-06-13 PROCEDURE — 3044F PR MOST RECENT HEMOGLOBIN A1C LEVEL <7.0%: ICD-10-PCS | Mod: CPTII,,,

## 2023-06-13 PROCEDURE — 3074F SYST BP LT 130 MM HG: CPT | Mod: CPTII,,,

## 2023-06-13 PROCEDURE — 99215 PR OFFICE/OUTPT VISIT, EST, LEVL V, 40-54 MIN: ICD-10-PCS | Mod: S$PBB,,,

## 2023-06-13 PROCEDURE — 99214 OFFICE O/P EST MOD 30 MIN: CPT | Mod: PBBFAC

## 2023-06-13 PROCEDURE — 1159F PR MEDICATION LIST DOCUMENTED IN MEDICAL RECORD: ICD-10-PCS | Mod: CPTII,,,

## 2023-06-13 PROCEDURE — 99999 PR PBB SHADOW E&M-EST. PATIENT-LVL IV: CPT | Mod: PBBFAC,,,

## 2023-06-13 RX ORDER — HYDROCODONE BITARTRATE AND ACETAMINOPHEN 10; 325 MG/1; MG/1
1 TABLET ORAL EVERY 6 HOURS PRN
Qty: 120 TABLET | Refills: 0 | Status: SHIPPED | OUTPATIENT
Start: 2023-06-13 | End: 2023-07-12 | Stop reason: SDUPTHER

## 2023-06-13 RX ORDER — ALPRAZOLAM 1 MG/1
.5-1 TABLET ORAL 2 TIMES DAILY PRN
Qty: 60 TABLET | Refills: 0 | Status: SHIPPED | OUTPATIENT
Start: 2023-06-13 | End: 2023-07-13 | Stop reason: SDUPTHER

## 2023-06-13 NOTE — PROGRESS NOTES
Palliative Medicine         Follow up    SUBJECTIVE:     Josey Flores is a 52 y.o. female with history of metastatic breast cancer, thyroid cancer post thyroidectomy (2017), and cervical cancer (2017). She is on surveillance on Letrozole and Ibrance. PET scan in May 2023 showed no evidence of recurrence or metastatic disease. She had a stroke with left-sided weakness (June 02, 2023). She is followed by Dr. Ba.     Chief complaint: Bilateral Rib Pain follow up      6/13/2023: Pt attended clinic with her partner, Alessio. She was in no acute distress. Vital signs stable on room air.    She reported persistent pain under her ribs bilaterally. She stated her Norco was not reinstated following her discharge from the hospital, and MSER 15mg once a day has not been effective for pain, though it helps with sleep. She stated she is aware that her PET scan showed no evidence of disease progression, but showed infection/inflammation in her lungs. She stated she is pleased it is not cancer, and she anxious to see the results of her upcoming CT Chest. She also reported anxiety, dyspnea, fatigue, insomnia, and restlessness which she attributes to persistent pain. She also reported running out of Xanax. She stated she is still depressed, but her mood has improved since starting Duloxetine. Her partner expressed feeling overwhelmed with appointments and driving to [place] for appointments with Neurology. He sought assistance with getting a neurology referral with [facility], but was told there was no available appointments for new pts. I informed pt and her partner that I am unsure of the process of scheduling Neurology referral at [facility], but I can ask my nursing staff to assist.     We discussed continuing opioid weaning, as her rib pain has not been shown to be cancer-related. Pt agreed to trial of stopping MSER 15mg, and continuing with Norco 10mg QID PRN for pain management. We also discussed chronic  pain referral once her pain is stable. Pt and her partner agreed.     LA  Reviewed and Summarized:        Past visits:    4/18/2023: Pt attended clinic with her partner, Alessio. She reported new bilateral under the rib and lower back pain that has started and worsened over the last two months. She stated she discussed this with Dr. Ba during her appointment on April 13, and a PET scan was ordered to assess for recurrent or metastatic disease. She denied, fever, chills, sweats, or dyspnea. She stated she has been taking Norco 10 four times a day, and MS ER 15mg Daily at night. She states she does not take MS ER in the morning due to fear of sleeping during the day. Pt also reported diarrhea, which has improved with Lomotil, in addition to increased anxiety and depression due to worsening pain.   I encouraged her to take Norco 10mg QID PRN for pain, and MS ER 15mg BID if her pain continues to worsen, and she is fine with mild sedation. Pt stated she is only comfortable with taking MSER at night. I noted she last filled her Norco   Pt stated she is willing to try an antidepressant for her mood, and she had stopped taking Wellbutrin about a month ago due to palpitations. I informed her that we will complete an EKG to assess her Qtc interval prior to starting an antidepressant. Her last EKG was in May 2021 showing Qtc of 435. Pt agreed to complete EKG.     2/14/2023: Patient comes to clinic with her significant other Alessio. We discussed her recent visits with Dr. Asencio and Deo. She is in good spirits as her Restaging PET scan 1/11/2023 without evidence of recurrent metastatic disease active and no new areas concerning for active disease. She noted she was instructed to continue current treatment for breast cancer and surveillance for cervical cancer. She denies any issues and notes that she continues to be active and her and significant other are going out to eat after visit. She notes that she has intermittent  pelvic pain in which she attributes to radiation but notes that current pain medication effective in managing. She notes that it does not happen often. In the setting of stable scans and continued improvement clinically, we dicussed continuing opoid wean. Patient currently taking MSER 15mg po BID and taking 3-4 doses of Norco daily. Will decrease MSER to 15mg po Daily and continue current hydrocodone regimen not to exceed 4 doses in a day. Will follow up in 4 weeks to continue opoid wean. No new scripts needed as MSER and hydrocodone were just filled on 2/8/23.     12/1/22: Patient comes to clinic with her significant other Alessio. She is still feeling sad because of her grand daughter since she moved out. She noted that she stopped taking her Wellbutrin but wants to start back taking it to help with her mood also to help her with the process of quitting smoking. Also she notes she has been dealing with worsening pain to her left ear. She saw Otolaryngologist recently and he noted no signs of changes on CT and offered patient surgery or non surgical management. Patient notes that she did not want surgery at that time because her doctor made her aware that the procedure may help but there is a possibility that it may not. She notes that the pain and drainage has worsened and she is now thinking about proceeding with surgery even if there is a risk that it will not work. She has been in touch with her Otolaryngologist's office to schedule appointment. She last saw Dr. Asencio 10/13/22 in which there were no changes and she was to continue on current treatment withLetrozole and palbociclib. Her visit with Gyn/onc also noted no new changes and everything was stable. During exam patient reports pelvic pain but notes that medication has been effective in managing it. Being that there were no changes with all of her MD visits, I spoke with patient about continuing plan from her previous PM visit with weaning MSER. She is in  agreement, we will wean down to MSER 15mg po BID. I did make patient aware that medications provided by me were not for her ear pain or or acute issues she may experience but it is indicated for malignant pain. I reinforced education about taking medications as prescribed and for what it is indicated for. I made her aware that we could not send in another early prescription as my counterpart had to do in November. On 11/1/22,  patient sent a message noting she ran out of her medication before scheduled visit. She reported that she was [facility] to the [facility] due to abdominal pain and then when she was discharged she cut her foot very badly and had to receive stiches. Due to these issues, she used her medications more frequently than she was prescribed. She was taking 2 tabs of MSER 30mg BID instead of 1 BID. She was taking hydrocodone 4-5x/ day. She admitted that she was not supposed to do this and will not do this again. She was hoping that provider would write the 15mg ER tabs to hold her over. She explained why she would not write for the 15mg and typically do not write early refills. This is the second time that she has taken ER medications more frequently than prescribed. She was counseled gain on the risks with this behavior and this would be her last warning as well as last early refill. Moving forward if she takes opioids other than rx this is grounds for dismissal from our clinic given issues with impulse control and safety. She expressed understanding and said that her  has also scolded her. She knows this was not a good choice and continues to apologize and promised she will not do it again. My partner explained our boundaries in clinic are not punitive but rather in place for her safety. I was present with my counterpart for the conversation and agreed that early refills will be not be permitted moving forward    10/06/2022  She comes to clinic with DEYSI Mccallum today. She is down because  her granddaughter is not in a good situation but otherwise feeling okay. Since we last saw each other her pain has improved which is likely related to the positive response to treatment. She would like to begin weaning the MSER back to 30mg BID and hopefully continue to decrease further. She is still taking hydrocodone 10mg QID with good response. Her mood improved with the resumption of Wellbutrin and is still cutting back on her tobacco use. She has been having ear pain which is being evaluated by ENT and is waiting to hear when her CT is scheduled. There seems to have been a charting error with her Letrozole resulting in d/c the order and unable to refill. I will reach out to oncology to reorder. We will follow up in 2 months and she will message me for refills whether she would like to continue weaning before then. We do not have HCPOA on file even though she remembers bringing to clinic. She will bring again next visit and confirms that her sister David is HCPOA.    09/01/22: Ms. Flores comes to clinic today with her fiance Alessio. She reports that her lower abdominal pain has worsened since brachytherapy started several weeks ago. She reports spontaneous lower abdominal pain not related to food but sometimes exacerbated by BM. She denies constipation. She has taken an extra dose of MSER at bedtime to help with sleep. We talked about the risks associated with this and would advise against doing this again. She is taking hydrocodone q4hr sometimes waking up at night and needing another dose averaging 4-6 in a day. I recommend we increase the long acting dose in hopes for better control and less PRN usage. She says her sleep is poor because this is when the pain intensifies (lying flat) but also anxiety and sadness since her granddaughter moved out of the home. She is taking Xanax HS which helps. She was taking Wellbutrin in the last but not sure why she stopped. For now lets not resume this and evaluate at her next  "visit.    06/16/2022: Patient doing okay.  No new issues since her last visit.  She continue to have some lower GI pains describes now as "cramping after a BM".  Previously, her discomfort was after eating.  She has been referred to GI.  She has some fatigue but managing.  Pain is controlled on current regimen.  She has upcoming scans and appointment with Dr. Asencio.  She has some anxiety and life stressors affecting her situation.  Her son and his ex-girfriend (now in [place]) can no longer care for their 4 year old daughter.  She and her partner are assuming the parental rights of her granddaughter.  They seem to be coping as well as can be expected.  Overall, she is doing okay.  We will continue to follow at quarterly intervals.  She knows to reach out if her situation changes or symptom burden worsens.    03/10/2022: Overall, patient seems to be doing okay.  She has some new GI issues with abdominal pain after eating and intermittent diarrhea not felt to be related to her disease or treatment.  Oncology discussed GI referral and she is considering this if no improvement.  We are adjusting her pain medication regimen today mostly to cut back on her prn Norco which she continues to use consistently at 5-6 tablets/day.  She knows she can reach out for additional questions or concerns.     01/06/2022: Patient is a 51 y.o. year old female presenting with for palliative follow up for her metastatic breast cancer, SCC of the cervix. Treatments currently on hold due to recurrent ear infection. She is anxious about results of her recent scan and fearful of disease progression while off treatment.  Otherwise, her pain continues to be controlled on her current regimen.  No new symptoms or complaints today.    ONCOLOGY DIAGNOSES:  Stage IV cervical squamous cell carcinoma   stage IV, T2 N1b M1, invasive breast carcinoma, ER/KY+, HER2-   Papillary thyroid carcinoma status post total thyroidectomy on 08/31/2017, mpT1b N0  "   Cervical HSIL MIREILLE 3 s/p LEEP, 2017    Patient looks and feels better compared to last visit.  She walked into her appt.  Ear is better and she is being followed by ENT.  She has resumed treatment for both her cervical and breast cancer and is followed closely by medical oncology and gyn oncology.  Pain continues to be controlled on current medication regimen.  She is having knee issues with swelling and likely OA.  We discussed referral to orthopedics.  She also plans to follow up with neurosurgery.  Overall, no major changes and no new issues.     LA  reviewed and summarized:      OBJECTIVE:     Review of Systems   Constitutional:  Positive for malaise/fatigue.   Respiratory:  Positive for shortness of breath. Negative for cough and wheezing.    Cardiovascular:  Negative for chest pain and palpitations.   Gastrointestinal:  Positive for nausea. Negative for abdominal pain, constipation and diarrhea.   Musculoskeletal:         Bilateral rib pain   Neurological:  Positive for weakness (S/P stroke. Left-sided weakness). Negative for dizziness, tingling, sensory change, speech change, seizures, loss of consciousness and headaches.   Psychiatric/Behavioral:  Positive for depression. Negative for suicidal ideas. The patient is nervous/anxious and has insomnia.       Review of Symptoms      Symptom Assessment (ESAS 0-10 Scale)  Pain:  8  Dyspnea:  5  Anxiety:  7  Nausea:  4  Depression:  8  Anorexia:  0  Fatigue:  8  Insomnia:  8  Restlessness:  8  Agitation:  8     CAM / Delirium:  Negative  Constipation:  Negative  Diarrhea:  Positive (Pt uses Lomotil PRN.Improved)    Anxiety:  Is nervous/anxious  Constipation:  No constipation    Bowel Management Plan (BMP):  Yes      Pain Assessment:    Location(s): chest and back (New substernal/rib pain)    Back       Location: lower        Quality: Aching        Quantity: 7/10 in intensity        Chronicity: Onset 2 month(s) ago, gradually worsening        Aggravating Factors:  Activity        Alleviating Factors: Opiates       Associated Symptoms: Myalgias  Chest       Location: bilateral        Quality: Sharp        Quantity: 7/10 in intensity        Chronicity: Onset 2 month(s) ago, gradually worsening since Pt reported bilateral under the rib and lower back pain, started two months ago and worsening. She stated Dr. Ba is aware, and has ordered a PET Scan on May 3, 2023 to assess for recurrent or metastatic disease.        Aggravating Factors: Activity        Alleviating Factors: Opiates        Associated Symptoms: Myalgias    Modified Osmar Scale:  0    ECOG Performance Status rdGrdrrdarddrderd:rd rd3rd Living Arrangements:  Lives with spouse    Psychosocial/Cultural:   See Palliative Psychosocial Note: No  Lives with her significant other of 16 years: they have 3 children combined, 2 sons from previous marriage. Granddaughter recently moved out of the home  **Primary  to Follow**  Palliative Care  Consult: No    Advance Care Planning   Advance Directives:   Living Will: Yes        Copy on chart: Yes    LaPOST: Yes    Do Not Resuscitate Status: Yes    Agent's Name:  David Acevedo, sister, [phone]    Decision Making:  Patient answered questions  Goals of Care: What is most important right now is to focus on remaining as independent as possible, symptom/pain control, curative/life-prolongation (regardless of treatment burdens). Accordingly, we have decided that the best plan to meet the patient's goals includes continuing with treatment.      Physical Exam:  Vitals:    Physical Exam  Vitals and nursing note reviewed.   Constitutional:       General: She is not in acute distress.     Appearance: Normal appearance. She is normal weight. She is ill-appearing.   HENT:      Head: Normocephalic and atraumatic.      Comments: Left facial droop     Nose: Nose normal. No congestion or rhinorrhea.      Mouth/Throat:      Mouth: Mucous membranes are moist.      Pharynx:  Oropharynx is clear. No oropharyngeal exudate or posterior oropharyngeal erythema.   Eyes:      General: No scleral icterus.        Right eye: No discharge.         Left eye: No discharge.      Conjunctiva/sclera: Conjunctivae normal.      Pupils: Pupils are equal, round, and reactive to light.   Cardiovascular:      Rate and Rhythm: Normal rate and regular rhythm.      Pulses: Normal pulses.      Heart sounds: Normal heart sounds. No murmur heard.    No gallop.   Pulmonary:      Effort: Pulmonary effort is normal. No respiratory distress.      Breath sounds: Normal breath sounds. No stridor. No wheezing.   Chest:      Chest wall: No tenderness.   Abdominal:      General: Bowel sounds are normal. There is no distension.      Palpations: Abdomen is soft.      Tenderness: There is no abdominal tenderness.   Musculoskeletal:         General: No swelling or tenderness. Normal range of motion.      Cervical back: Normal range of motion and neck supple.      Right lower leg: No edema.      Left lower leg: No edema.      Comments: S/P stroke. Left arm and left leg weakness   Skin:     General: Skin is warm and dry.      Coloration: Skin is not jaundiced or pale.      Findings: No rash.   Neurological:      Mental Status: She is alert and oriented to person, place, and time.      GCS: GCS eye subscore is 4. GCS verbal subscore is 5. GCS motor subscore is 6.      Motor: Weakness (s/p stroke June 02/ Left sided weakness) present.      Gait: Gait abnormal (Limping).   Psychiatric:         Attention and Perception: Attention normal.         Mood and Affect: Mood is anxious.         Speech: Speech normal.         Behavior: Behavior normal.         Thought Content: Thought content normal.         Cognition and Memory: Cognition normal.         Judgment: Judgment normal.        Radiology and laboratory data reviewed    ASSESSMENT/PLAN:     Malignant neoplasm of overlapping sites of right breast in female, estrogen receptor  positive  -Followed by Dr. Asencio  -On Surveillance- Continues Letrozole and Ibrance  -PET scan 5/03: There is a focal mildly hypermetabolic ground-glass opacity in the left lower lobe which is new from the prior examination. This is more likely infectious/inflammatory in nature. Short-term CT chest follow-up is advised.  -Upcoming CT chest to reassess ground-glass opacity    Malignant neoplasm of endocervix  -Followed by  noted JUAN FRANCISCO, follow up in 3 months   -Restaging scan 5/03/2023 without evidence of recurrent metastatic disease active.  -Holding further maintenance immunotherapy at this time given no evidence of recurrent / progressive metastatic disease    -Surveillance for cervical cancer.    Neoplasm-related pain   -Stop MSER as it is not effective, and trial opioid weaning in the setting of stable scans  -Continue Norco 10mg QID PRN not to exceed 4 doses in a day  -Pt open to chronic pain management referral given no evidence of cancer recurrence or metastasis  UDS repeat at next appointment- Pt had been off [place] following hospital discharge  Narcan rx- Pt stated she knows how to use it     Anxiety/Depression  -Likely worsened by pain and stroke diagnosis/care demands.   -Will ask nursing staff to inquire/assist with getting an apt with Neurology at [facility] instead of [facility].   -Continue Xanax 0.5mg-1mg BID PRN  -Continue Duloxetine 30mg Daily    Palliative Care Encounter  -HCPOA:  David Acevedo, sister, [phone]  -Code Status: DNR; LaPOST completed and uploaded in EMR  -[facility]-Continue cancer treatment with surveillance and symptom management.      Follow up: 4 weeks for pain assessment    40 minutes of total time spent on the encounter, which includes face to face time and non-face to face time preparing to see the patient (eg, review of tests), Obtaining and/or reviewing separately obtained history, Documenting clinical information in the electronic or other health record, Independently  interpreting results (not separately reported) and communicating results to the patient/family/caregiver, or Care coordination (not separately reported).    Signature: SUZIE GOTTI NP

## 2023-06-13 NOTE — PATIENT INSTRUCTIONS
Ok to stop Morphine long acting, as it is not managing your pain.   We will continue Norco 10 every 6 hours. Max four doses a day  Continue Cymbalta for your mood  We will refer you to chronic pain management for further help with your pain, as your cancer as remained stable.

## 2023-06-14 ENCOUNTER — PATIENT MESSAGE (OUTPATIENT)
Dept: PHARMACY | Facility: CLINIC | Age: 53
End: 2023-06-14
Payer: MEDICAID

## 2023-06-15 DIAGNOSIS — F32.A ANXIETY AND DEPRESSION: ICD-10-CM

## 2023-06-15 DIAGNOSIS — F41.9 ANXIETY AND DEPRESSION: ICD-10-CM

## 2023-06-16 RX ORDER — DULOXETIN HYDROCHLORIDE 30 MG/1
30 CAPSULE, DELAYED RELEASE ORAL DAILY
Qty: 30 CAPSULE | Refills: 0 | Status: SHIPPED | OUTPATIENT
Start: 2023-06-16 | End: 2023-08-07 | Stop reason: SDUPTHER

## 2023-06-19 ENCOUNTER — SPECIALTY PHARMACY (OUTPATIENT)
Dept: PHARMACY | Facility: CLINIC | Age: 53
End: 2023-06-19
Payer: MEDICAID

## 2023-06-19 NOTE — TELEPHONE ENCOUNTER
Outgoing call to patient for Ibrance refill. Patient was recently in the hospital due to having a stroke and has been prescribed a blood thinner. She missed a few doses while in the hospital and currently has 1 week on hand before she starts her off week. Routing to assigned RPH to follow up accordingly. RPH notified via Teams.

## 2023-06-22 ENCOUNTER — TELEPHONE (OUTPATIENT)
Dept: SMOKING CESSATION | Facility: CLINIC | Age: 53
End: 2023-06-22
Payer: MEDICAID

## 2023-06-22 DIAGNOSIS — E03.9 HYPOTHYROIDISM, UNSPECIFIED TYPE: ICD-10-CM

## 2023-06-22 RX ORDER — LEVOTHYROXINE SODIUM 75 UG/1
75 TABLET ORAL
Qty: 30 TABLET | Refills: 11 | Status: SHIPPED | OUTPATIENT
Start: 2023-06-22 | End: 2023-08-10 | Stop reason: SDUPTHER

## 2023-06-22 NOTE — TELEPHONE ENCOUNTER
Spoke to patient over the phone in regard to missed clinic intake appt. Rescheduled for 7/13/23 at 4 PM.

## 2023-06-26 ENCOUNTER — PATIENT MESSAGE (OUTPATIENT)
Dept: PHARMACY | Facility: CLINIC | Age: 53
End: 2023-06-26
Payer: MEDICAID

## 2023-06-28 ENCOUNTER — CLINICAL SUPPORT (OUTPATIENT)
Dept: REHABILITATION | Facility: HOSPITAL | Age: 53
End: 2023-06-28
Attending: STUDENT IN AN ORGANIZED HEALTH CARE EDUCATION/TRAINING PROGRAM
Payer: MEDICAID

## 2023-06-28 DIAGNOSIS — I63.9 CEREBROVASCULAR ACCIDENT (CVA), UNSPECIFIED MECHANISM: ICD-10-CM

## 2023-06-28 DIAGNOSIS — Z74.09 IMPAIRED FUNCTIONAL MOBILITY, BALANCE, GAIT, AND ENDURANCE: ICD-10-CM

## 2023-06-28 PROCEDURE — 97162 PT EVAL MOD COMPLEX 30 MIN: CPT

## 2023-06-29 ENCOUNTER — PATIENT MESSAGE (OUTPATIENT)
Dept: PHARMACY | Facility: CLINIC | Age: 53
End: 2023-06-29
Payer: MEDICAID

## 2023-06-29 PROBLEM — Z74.09 IMPAIRED FUNCTIONAL MOBILITY, BALANCE, GAIT, AND ENDURANCE: Status: ACTIVE | Noted: 2023-06-29

## 2023-06-29 NOTE — PLAN OF CARE
[facility]  Physical Therapy Neurological Rehabilitation Initial Evaluation     Name: Josey Schmitz [place]  Clinic Number: [id number]    Therapy Diagnosis:   Encounter Diagnoses   Name Primary?    Cerebrovascular accident (CVA), unspecified mechanism     Impaired functional mobility, balance, gait, and endurance      Physician: Raoul Panchal MD    Physician Orders: PT Eval and Treat   Medical Diagnosis from Referral:      I63.9 (ICD-10-CM) - Cerebrovascular accident (CVA), unspecified mechanism     Evaluation Date: 6/28/2023  Authorization Period Expiration: 9/1/23  Plan of Care Expiration: 8/22/23  Progress Note Due: 7/28/23  Visit # / Visits authorized: 1/ 1  FOTO: 0/3 time constraints    Precautions: Standard and Falls    Time In: 1550  Time Out: 1628  Total Billable Time: 38 minutes    Subjective      Date of onset: 6/04/23    History of current condition - Josey reports: that she had a fall which is when she was brought to the Emergency department and the diagnoses was determined to be a stroke. This caused decreased strength in left side with sensation intact. She also reported Visual deficits in left eye from potential stroke 2 years ago.  Patient discharged with no further therapy until today's visit.        Imaging, MRI studies: Right frontal and parietal territory acute infarcts as above.  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  Prior Level of Function: independent; with varying levels of function due to past medical history.  Current Level of Function: decreased endurance, balance, and coordination. Uses scooter at grocery store and needs minimal assist after being up for long periods of time.     Pain: bilateral flank, Left  knee  Current for flank pain: 0/10, worst 9/10, best 0/10.  knee: current 4/10, worst 7/10, best: 3/10  Location: bilateral abdomen, Left knee pain   Description: Sharp pain for flank, dull pain for knee  Aggravating Factors: stairs and walking long distances for knee pain, no factors given for flank pain.   Easing Factors: pain medication    Patient's goals: is to return to prior level of function; which includes walking without assistance, increasing endurance, and to reduce falling    Medical History:   Past Medical History:   Diagnosis Date    Anxiety     Asthma     Breast cancer 2017    Cancer     right breast, metastatic-lymph nodes, bone, and thyroid     COPD (chronic obstructive pulmonary disease)     Depression     History of psychiatric hospitalization     2000; suicidal ideation    Hx of psychiatric care     Hypothyroidism     Miscarriage     five miscarriages    Obesity     Psychiatric problem     Thyroid cancer 2017       Surgical History:   Josey Flores  has a past surgical history that includes Cholecystectomy; Tonsillectomy; Adenoidectomy; Appendectomy; Abscess drainage; Skin graft (06/16/2017); Thyroidectomy (Bilateral); Mass excision; Breast biopsy (Right); Fluoroscopy (N/A, 01/28/2021); Mediport insertion, single (Right, 02/2021); and Endobronchial ultrasound (Bilateral, 02/18/2021).    Medications:   Josey has a current medication list which includes the following prescription(s): albuterol, alprazolam, atorvastatin, bupropion, calcium carbonate, clopidogrel, cyanocobalamin, diphenoxylate-atropine 2.5-0.025 mg, duloxetine, vitamin d2, hydrocodone-acetaminophen, hydroxyzine hcl, ipratropium, letrozole, levothyroxine, levothyroxine, multivitamin, naloxone, palbociclib, pentoxifylline, potassium chloride sa, vitamin e, and [DISCONTINUED] olanzapine.    Allergies:   Review of patient's allergies indicates:   Allergen Reactions    Gabapentin Swelling     Note: unilateral joint edema, unclear if  due to gabapentin    Nsaids (non-steroidal anti-inflammatory drug) Other (See Comments)     Instructed not to take NSAID drugs with chemo pill.    Clindamycin Rash    Vancomycin analogues Itching        Objective    STRENGTH:    L/E MMT Right   Left Range of motion limitations   Hip Flexion  4/5 3+/5    Hip Extension  3-/5 3-/5    Hip Abduction  NT NT    Knee Extension 5/5 3+/5    Knee Flexion 4+/5 3+/5    Ankle DF 5/5 4/5    Ankle PF 5/5 4/5        Posture:  Patient presents with postural abnormalities which include:    [x] Forward Head   [] Increased Lumbar Lordosis   [x] Rounded Shoulder   [] Flat Back Posture   [] Increased Thoracic Kyphosis [] Pes Planus   [] Increased Trunk Sway  [] Valgus knee position   [] Increased Trunk Rotation  [] Varus knee position   [] Increased cervical lordosis [x] Posterior Pelvic Tilt    [x] Other: Able to improve with verbal cues.    Balance:  Patient's balance was observed at the following level:  Sit Static: Independent  Sit Dynamic:Independent  Stand Static: Independent  Stand Dynamic: minimum assist    Gait Analysis: The patient ambulated with the following assistive device: none ; the pt presents with the following gait abnormalities: unsteady gait, decreased step length, decreased arm swing, and lack of hip extension.     Sensation:  Type Response Comments   Light Touch N    Localization N    Proprioception N She got confused on Left to Right but corrected   N = Normal, I = Impaired, A = Absent    Coordination:  Type Response Left  Response Right  Comments   Alternating Toe Tap I I Slow, uncoordinated   Heel to Shin I N    Finger to Nose  NT NT      Functional Assessments:  Test Score Norms   TUG 24 Seconds < 10 sec = Normal  < 20 sec = Good mobility, can go out alone, mobile without assistive device  < 30 sec = Problems, cannot go outside alone, requires gait aid  > 14 sec indicates a brent risk for falls     30 second sit to stand 2 sit to stands    4 Square Step Test NT         Treatment     Total Treatment time separate from Evaluation: 0  minutes    Patient Education and Home Exercises     Education provided:   - Patient educated on Plan of Care for PT.     Written Home Exercises Provided:PT continuing to assess patient's performance.  Home Exercise Program will be provided at upcoming session.  Josey verbalized good  understanding of the education provided. See EMR under Patient Instructions for exercises provided during future therapy sessions.    See EMR under Media for exercises provided in future sessions.    Assessment     Josey is a 52 y.o. female referred to outpatient Physical Therapy with a medical diagnosis of I63.9 (ICD-10-CM) - Cerebrovascular accident (CVA), unspecified mechanism. The patient presents with signs and symptoms consistent with diagnosis along with impairments which include weakness, impaired endurance, impaired functional mobility, gait instability, impaired balance, visual deficits, impaired cognition, decreased coordination, pain, and impaired coordination.  These impairments are limiting patient's ability to be efficient and safe with her daily tasks and responsibilites. Patient continues to be at risk for falls and would benefit from PT.     Patient prognosis is Fair.   Patient will benefit from skilled outpatient Physical Therapy to address the deficits stated above and in the chart below, provide patient/family education, and to maximize patient's level of independence.     Plan of care discussed with patient: Yes  Patient's spiritual, cultural and educational needs considered and patient is agreeable to the plan of care and goals as stated below:     Anticipated Barriers for therapy: co-morbidities, sedentary lifestyle, chronicity of condition, and motivation to improve condition    Medical Necessity is demonstrated by the following   History  Co-morbidities and personal factors that may impact the plan of care [] LOW: no personal factors /  co-morbidities  [] MODERATE: 1-2 personal factors / co-morbidities  [x] HIGH: 3+ personal factors / co-morbidities    Moderate / High Support Documentation:   Past Medical History:   Diagnosis Date    Anxiety     Asthma     Breast cancer 2017    Cancer     right breast, metastatic-lymph nodes, bone, and thyroid     COPD (chronic obstructive pulmonary disease)     Depression     History of psychiatric hospitalization     2000; suicidal ideation    Hx of psychiatric care     Hypothyroidism     Miscarriage     five miscarriages    Obesity     Psychiatric problem     Thyroid cancer 2017         Examination  Body Structures and Functions, activity limitations and participation restrictions that may impact the plan of care [] LOW: addressing 1-2 elements  [x] MODERATE: 3+ elements  [] HIGH: 3+ elements (please support below)    Moderate / High Support Documentation: see evaluation/objective measurements above.     Clinical Presentation [] LOW: stable  [x] MODERATE: Evolving  [] HIGH: Unstable     Decision Making/ Complexity Score: moderate             GOALS:    Short Term Goals:  4 weeks Progress    Function: Patient will demonstrate improved function as indicated by a functional limitation score improved by 3 points from initial measure.  PC   Strength: Patient will demonstrate improved strength by performing 3 sit to stands in 30 seconds in order to progress towards independence with functional activities.  PC   Balance: Complete Lainez Balance Scale and set goals accordingly.  PC   Coordination: Patient will demonstrate improved time of 18 seconds on TUG to demonstrate improved coordination and safety with mobility. PC   HEP: Patient will demonstrate independence with HEP in order to progress toward functional independence.          Long Term Goals:  8 weeks Progress   Function: Patient will demonstrate improved function as indicated by a functional limitation score improved 5 points from initial measure.  PC   Strength:  Patient will demonstrate improved strength by performing 5 sit to stands in 30 seconds in order to progress towards independence with functional activities.  PC   Patient will return to ADL's, IADL's, community involvement, recreational activities, and work-related activities with less than or equal to 4/10 pain and maximal function.  PC   Balance: Complete Lainez Balance Scale and set goals accordingly.  PC   Coordination: Patient will demonstrate improved time of 12 sec on TUG to demonstrate improved coordination and safety with mobility. PC   HEP: Patient educated on HEP in preparation for d/c verbalizing and demonstrating good understanding of topics discussed.   PC         Goals Key:  PC= progressing/continue; PM= partially met;        DC= discontinue  Plan     Plan of care Certification: 6/28/2023 to 8/22/2023    Outpatient Physical Therapy 2 times weekly for 8 weeks to include the following interventions: Electrical Stimulation attended, Gait Training, Manual Therapy, Moist Heat/ Ice, Neuromuscular Re-ed, Orthotic Management and Training, Patient Education, Self Care, Therapeutic Activities, and Therapeutic Exercise.     Love Olivas, PT

## 2023-07-03 ENCOUNTER — PATIENT MESSAGE (OUTPATIENT)
Dept: PHARMACY | Facility: CLINIC | Age: 53
End: 2023-07-03
Payer: MEDICAID

## 2023-07-05 ENCOUNTER — CLINICAL SUPPORT (OUTPATIENT)
Dept: REHABILITATION | Facility: HOSPITAL | Age: 53
End: 2023-07-05
Payer: MEDICAID

## 2023-07-05 ENCOUNTER — HOSPITAL ENCOUNTER (OUTPATIENT)
Dept: RADIOLOGY | Facility: HOSPITAL | Age: 53
Discharge: HOME | End: 2023-07-05
Attending: INTERNAL MEDICINE
Payer: MEDICAID

## 2023-07-05 DIAGNOSIS — C77.3 MALIGNANT NEOPLASM METASTATIC TO LYMPH NODE OF AXILLA: ICD-10-CM

## 2023-07-05 DIAGNOSIS — Z74.09 IMPAIRED FUNCTIONAL MOBILITY, BALANCE, GAIT, AND ENDURANCE: Primary | ICD-10-CM

## 2023-07-05 PROCEDURE — 97110 THERAPEUTIC EXERCISES: CPT

## 2023-07-05 PROCEDURE — 71260 CT THORAX DX C+: CPT | Mod: 26,,, | Performed by: RADIOLOGY

## 2023-07-05 PROCEDURE — 71260 CT CHEST WITH CONTRAST: ICD-10-PCS | Mod: 26,,, | Performed by: RADIOLOGY

## 2023-07-05 PROCEDURE — 71260 CT THORAX DX C+: CPT | Mod: TC

## 2023-07-05 PROCEDURE — 25500020 PHARM REV CODE 255: Performed by: INTERNAL MEDICINE

## 2023-07-05 RX ADMIN — IOHEXOL 100 ML: 350 INJECTION, SOLUTION INTRAVENOUS at 10:07

## 2023-07-05 NOTE — PROGRESS NOTES
[facility]   Physical Therapy Treatment Note      Name: Josey Schmitz [facility] Number: [id number]    Therapy Diagnosis:   Encounter Diagnosis   Name Primary?    Impaired functional mobility, balance, gait, and endurance Yes     Physician: Raoul Panchal MD    Visit Date: 7/5/2023  Evaluation Date: 6/28/2023  Authorization Period Expiration: 9/1/23  Plan of Care Expiration: 8/22/23  Progress Note Due: 7/28/23  Visit # / Visits authorized: 1/ 20  (+ eval)  FOTO: 0/3 time constraints    PTA Visit #: 0/5     Time In: 1545  Time Out: 1633  Total Billable Time: 48 minutes    Subjective     Pt reports: that she was admitted to the hospital for 3 days and had low blood pressure while being admitted along with bladder infection.   She  was given a  home exercise program.  Response to previous treatment: responded well to previous treatment.   Functional change: Pt fell since last treatment session.    Pain: 0/10  Location:     Objective    OLVERA BALANCE ASSESSMENT  Max score of 4 each item.    7/5/23   1.Sitting to Standing  3/4   2.Standing Unsupported 4/4   3.Sitting with Back Unsupported  4/4   4.Standing to Sitting  3/4   5.Transfers 4/4   6.Standing Unsupported with Eyes Closed 4/4   7.Standing Unsupported with Feet Together 2/4   8.Reaching Forward with Outstretched Arm while Standing  2/4   9. Object from the Floor from a Standing Position  3/4   10.Turning to Look Behind Shoulder while Standing  4/4   11.Turn 360 Degrees  2/4   12.Placing Alternate Foot on Step while Standing Unsupported 2/4 (35 seconds)   13.Standing Unsupported One Foot in Front 0/4 (lost balance trying to step out)   14.Standing on One Leg 2/4 (left 3 seconds Right: 9 seconds)     Patient reports a score of 39/56 on the Olvera Balance Assessment on 7/5/23.      Score:   0-20= high fall risk   21-40 moderate fall risk   41-56 low fall risk  Coordination:  Type Response Left  Response Right  Comments   Alternating  "Toe Tap I I Slow, uncoordinated   Heel to Orozco I N     Finger to Nose  I I  slow, Left she had to correct finger position for misses.     Vision:  Type Response Comments   Tracking "H" Pattern NT    Horizontal Saccade I Impaired Left with head turn and diplopia   Vertical Saccade N    VOR Horizontal N    VOR Vertical N    Convergence I Diplopia at 10 inches     Objective Measures updated at progress report unless specified.     7/5/23    Vitals before treatment: BP: 122/90, HR: 89, Spo2: 97  Vitals after treatment: BP: 118/82, HR: 92, spo2: 98  Treatment     Josey received the treatments listed below:        THERAPEUTIC EXERCISES to develop strength, endurance, ROM, flexibility, posture, and core stabilization for (0) minutes including:    Intervention Performed Today    Supine Stretches  - 3x30 seconds bilateral for each:  - lower trunk rotation   - single knee to chest  - hamstring/ heelcord   Seated Stretches  - 3x30 seconds bilateral for each:  - hamstring/ heelcord  - hip adductor   Standing Stretches  - 3x15 seconds bilateral for each:  - IT band   - heelcord    Seated lower extremity exercises  - hip flexion 2x10 bilateral   - hamstring curl 2x10 bilateral   - dorsiflexion/ plantarflexion 2x20 bilateral   - hip adduction against ball 1x10 with 3 second hold  - hip abduction against belt 1x10 with 3 second hold   Standing lower extremity exercises   - hip flexion 2x10 bilateral   - hip abduction 2x10 bilateral   - hip extension 2x10 bilateral   - heel raise 2x10                          neuromuscular re-education activities to improve: Balance, Coordination, Kinesthetic, Sense, Proprioception, and Posture for (37) minutes. The following activities were included:    Intervention Performed Today    Air Ex Beam  - Tandem holds 5 seconds, 5x's bilateral   - Tandem walking forward/backward 2x's each   - Side stepping 2x's each direction  - single leg standing while tapping opposite foot forward and back on beam "   Orange Rubber Beam  - Tandem holds 5 seconds, 5x's bilateral   - Tandem walking forward/backward 2x's each   - Side stepping 2x's each direction  - single leg standing while tapping opposite foot forward and back on beam   Air Ex Pad  - Weight shifts Left to Right 2x10  - Weight shifts forward/back 2x10  - single leg standing holds   - Eyes closed 10 seconds  - One lower extremity on pad and opposite lower extremity on step tossing ball to wall    Hand paddled per Neuro WALE technique and for entirety of session     Core Exercises in supine  Bilateral lower extremities over Tball for each:   - lower trunk rotation 2x10  - bilateral knees to chest 2x10  - Bridges with chest press between each rep 2x10   Perry Balance Scale x - see above   Patient education x Patient was given Home Exercise for balance    Vision testing  x  - see above         THERAPEUTIC ACTIVITIES to improve dynamic and functional  performance for (11) minutes including:    Intervention Performed Today    Patient and yi educated community resources as related to her neurological care due to time of scheduled appointment, HR and BP parameters for PT and being safe medically for physical therapy.   Vital checks during session x  Pt and family verbalized good understanding.                                           Gait Training: to improve functional mobility and safety for 0  minutes, including:  -     Patient Education and Home Exercises       Education provided:   -7/5/23: see above.     Written Home Exercises Provided: yes. Exercises were reviewed and Josey was able to demonstrate them prior to the end of the session.  Josey demonstrated good  understanding of the education provided. See EMR under Patient Instructions for exercises provided during therapy sessions    Assessment     Patient has deficits in her vision in her left eye. She is also at risk for falling due to score of the perry balance scale. She uses her hands and has a high  amount of trunk flexion when performing sit to stands. Her Primary care physician was also notified about getting an order for Speech Therapy to help with cognitive deficits.     Josey Is progressing well towards her goals.   Pt prognosis is Good.     Pt will continue to benefit from skilled outpatient physical therapy to address the deficits listed in the problem list box on initial evaluation, provide pt/family education and to maximize pt's level of independence in the home and community environment.     Pt's spiritual, cultural and educational needs considered and pt agreeable to plan of care and goals.     Anticipated barriers to physical therapy: co-morbidities, sedentary lifestyle, chronicity of condition, and motivation to improve condition    Goals: is to return to prior level of function; which includes walking without assistance, increasing endurance, and to reduce falling    Plan     Continue Plan of Care (POC) and progress per patient tolerance. See Treatment section for exercise progression.    Love Olivas, PT

## 2023-07-06 DIAGNOSIS — I63.9 CEREBROVASCULAR ACCIDENT (CVA), UNSPECIFIED MECHANISM: Primary | ICD-10-CM

## 2023-07-06 NOTE — TELEPHONE ENCOUNTER
Specialty Pharmacy - Refill Coordination    Specialty Medication Orders Linked to Encounter      Flowsheet Row Most Recent Value   Medication #1 palbociclib (IBRANCE) 125 mg Cap (Order#[id number], Rx#[id number])            Refill Questions - Documented Responses      Flowsheet Row Most Recent Value   Patient Availability and HIPAA Verification    Does patient want to proceed with activity? Yes   HIPAA/medical authority confirmed? Yes   Relationship to patient of person spoken to? Self   Refill Screening Questions    Changes to allergies? No   Changes to medications? No   New conditions since last clinic visit? No   Unplanned office visit, urgent care, ED, or hospital admission in the last 4 weeks? No   How does patient/caregiver feel medication is working? Good   Financial problems or insurance changes? No   How many doses of your specialty medications were missed in the last 4 weeks? 0   Why were doses missed? Other (comment)  [pt missed doses while in hospital]   Would patient like to speak to a pharmacist? No   When does the patient need to receive the medication? 07/10/23   Refill Delivery Questions    How will the patient receive the medication? [facility]   When does the patient need to receive the medication? 07/10/23   Shipping Address Home   Address in [facility] confirmed and updated if neccessary? Yes   Expected Copay ($) 0   Is the patient able to afford the medication copay? Yes   Payment Method zero copay   Days supply of Refill 28   Supplies needed? No supplies needed   Refill activity completed? Yes   Refill activity plan Refill scheduled   Shipment/Pickup Date: 07/07/23            Current Outpatient Medications   Medication Sig    albuterol (PROVENTIL/VENTOLIN HFA) 90 mcg/actuation inhaler Inhale 2 puffs into the lungs every 4 (four) hours as needed for Wheezing or Shortness of Breath.    ALPRAZolam (XANAX) 1 MG tablet Take 0.5-1 tablets (0.5-1 mg total) by mouth 2 (two) times daily as needed for  Anxiety.    atorvastatin (LIPITOR) 40 MG tablet Take 1 tablet (40 mg total) by mouth once daily.    buPROPion (WELLBUTRIN SR) 150 MG TBSR 12 hr tablet Take 1 tablet (150 mg total) by mouth 2 (two) times daily. Take 1 tablet (150mg) once daily for 1 week then increase to twice daily    calcium carbonate 400 mg calcium (1,000 mg) Chew Take 2,000 mg by mouth once daily.    clopidogreL (PLAVIX) 75 mg tablet Take 1 tablet (75 mg total) by mouth once daily.    cyanocobalamin (VITAMIN B-12) 1000 MCG tablet Take 1 tablet (1,000 mcg total) by mouth once daily.    diphenoxylate-atropine 2.5-0.025 mg (LOMOTIL) 2.5-0.025 mg per tablet Take 1 tablet by mouth 4 (four) times daily as needed for Diarrhea.    DULoxetine (CYMBALTA) 30 MG capsule Take 1 capsule (30 mg total) by mouth once daily. It may take up to 2 to 6 weeks to see full effect of Cymbalta.    ergocalciferol (VITAMIN D2) 50,000 unit Cap Take 5,000 Units by mouth once daily at 6am.     HYDROcodone-acetaminophen (NORCO)  mg per tablet Take 1 tablet by mouth every 6 (six) hours as needed for Pain. Max 4 doses a day.    hydrOXYzine HCL (ATARAX) 25 MG tablet Take 1 tablet (25 mg total) by mouth 3 (three) times daily as needed for Itching.    ipratropium (ATROVENT) 42 mcg (0.06 %) nasal spray 2 sprays by Nasal route 4 (four) times daily. As needed for rhinitis. Take in evening before bed and in the morning    letrozole (FEMARA) 2.5 mg Tab Take 1 tablet (2.5 mg total) by mouth once daily.    levothyroxine (SYNTHROID) 200 MCG tablet Take 1 tablet (200 mcg total) by mouth once daily.    levothyroxine (SYNTHROID) 75 MCG tablet Take 1 tablet (75 mcg total) by mouth before breakfast.    multivitamin (THERAGRAN) per tablet Take 1 tablet by mouth once daily.    naloxone (NARCAN) 4 mg/actuation Spry SMARTSIG:Both Nares    palbociclib (IBRANCE) 125 mg Cap Take one capsule (125 mg) by mouth once daily on days 1-21 of each 28-day cycle.    pentoxifylline (TRENTAL) 400 mg TbSR  Take 1 tablet (400 mg total) by mouth 2 (two) times a day.    potassium chloride SA (K-DUR,KLOR-CON) 20 MEQ tablet Take 1 tablet (20 mEq total) by mouth once daily. Take daily for 3 days.    vitamin E 1000 UNIT capsule Take 1 capsule (1,000 Units total) by mouth once daily.   Last reviewed on 6/13/2023  1:15 PM by Antonieta Sanches LPN    Review of patient's allergies indicates:   Allergen Reactions    Gabapentin Swelling     Note: unilateral joint edema, unclear if due to gabapentin    Nsaids (non-steroidal anti-inflammatory drug) Other (See Comments)     Instructed not to take NSAID drugs with chemo pill.    Clindamycin Rash    Vancomycin analogues Itching    Last reviewed on  6/13/2023 1:13 PM by Antonieta Sanches      Tasks added this encounter   No tasks added.   Tasks due within next 3 months   7/10/2023 - Refill Coordination Outreach (1 time occurrence)     Kyara Jimenez, PharmD  [facility]  [address] [place], [place] [address]  [facility] [address]  Phone: [phone]  Fax: [phone]

## 2023-07-06 NOTE — PATIENT INSTRUCTIONS
HOME EXERCISE PROGRAM  Created by Love Olivas PT, PHILLIP  Mar 9th, 2023 View videos at [email or web address]     Total 5      BALANCE - SINGLE LEG    Start by standing with your back to a corner and a chair in front of you for a handhold. Lift up one foot and balance on the other foot. Stand upright, keep your hips level to the ground, and keep your gaze ahead. Hold this position for 10-20 seconds then relax. Switch feet and repeat. Also remember to not let your hip drop during this balancing exercise. Repeat 2 Times   Hold 10 Seconds   Complete 1 Set   Perform 1 Times a Day            BALANCE - TANDEM    Start by standing with your back to a corner and a chair in front of you for a handhold. Line up your feet in a heel-toe position. Stand upright, keeping your gaze ahead. Hold this position for 30 seconds then relax. Switch feet and repeat. Repeat 1 Time   Hold 30 Seconds   Complete 1 Set   Perform 1 Times a Day            BALANCE - ROMBERG    Start by standing with your back to a corner and a chair in front of you for a handhold. Bring your feet together and stand upright, keeping your gaze ahead. Hold this position for 1 minute then relax.    Progress with:  - Head turns/nods 5x's each  - Arm lifts/reaches 5x's each  - Stand on foam cushion or folded towel Repeat 1 Time   Hold 1 Minute   Complete 1 Set   Perform 1 Times a Day            LATERAL STEPS WITH SUPPORT - TABLE - COUNTERTOP     front of a sturdy table or countertop. Take side steps and use the table or countertop for support. Walk about 8 - 10 steps in each direction.  - Repeat 3x's in each direction  - Can progress with head turns and arm reach     Complete 3 Sets   Perform 1 Times a Day            Standing Backward Pivot Step  - Stand facing countertop with both hands on counter for support.  -Then slowly take a step backward with Right foot while rotating your body to the right  - Then step back facing the counter.    - After 8 repetitions, repeat  with L side. Repeat 8 Times   Complete 1 Set   Perform 1 Times a Day

## 2023-07-07 ENCOUNTER — PATIENT MESSAGE (OUTPATIENT)
Dept: INFECTIOUS DISEASES | Facility: CLINIC | Age: 53
End: 2023-07-07
Payer: MEDICAID

## 2023-07-10 NOTE — NURSING
Injection given without difficulties.Bandaid applied. Patient instructed to stay in the clinic for 15 minutes. Patient verbalized understanding and will notify nurse with any complaints.  
Referred to bariatrics 7/3/23 by PCP/Cecil  For office use only: Q    Insurance Company: [employer]         ID #: [id number]    Per email from Ermelinda at [facility]: Medical criteria requirements and medical policy remain the same for 2023.  Per email from Ermelinda saxena [facility]: MWM and Nutritional Counseling covered under medical benefits.     Per [facility] online and [facility] Medical Management Policy \"Certification for surgical treatment of obesity\":    Policy effective date of coverage: 01/01/2023  Individual Deductible: $250  Amount met (to date): $250  Individual Max out of pocket: $500 Amount met (to date): $250  Family Deductible: $4750   Amount met (to date): $1215.34  Family Max out of pocket: $9500  Amount met (to date): $1215.34    Copay amount  · PCP visit $35  · Specialist visit $60    Procedure (inpatient or outpatient) co-insurance: 20% once deductible has been met.     Is there coverage for Bariatric Surgery under patient's policy? Yes    Are the following inpatient surgical procedures covered?   • Lap Gastric Band (02046) YES  • Gastric Bypass (47799) YES  • Sleeve Gastrectomy (75352) YES    Does the hospital (for surgery) need to be credentialed as [facility] ([facility])? [employer]? Any other credential restrictions? No    Psychological evaluation: Required    BMI requirements:   · BMI > 40 OR   · BMI 35 or greater in conjunction with severe comorbidities such as:  · Uncontrolled diabetes mellitus; or  · Life threatening cadio-pulmonary problems, (for example, severe obstructive sleep apnea, ischemic heart disease, Pickwickian syndrome and/or obesity-related cardiomyopathy); or  · Resistant hypertension; or  · Fatty liver  · Severe DJD in a weight bearing joint    Other requirements:  · Member has attempted weight loss in the past without successful long-term weight reduction  · Documentation that member has actively participated in and has demonstrated compliance 
with a medical course of weight loss therapy supervised and monitored by a physician. The course of therapy must include a calorie-restricted diet, exercise and behavior therapy. This physician-supervised medical weight loss program must meet all of the following criteria:  · Member's participation in the program must be documented in the medical records by the supervision physician. Note: A physician's summary letter is not sufficient documentation. Documentation should include medical records of physician's contemporaneous assessment of patient's progress throughout the course of the nutrition, exercise and behavioral therapy program. For members who participate in a physician-administered nutrition and exercise program (e.g., HMR, MediFast, OptiFast), program records documenting the member's participation and progress may substitute for physician medical records  · Behavior modification program supervised by a qualified professional  · Nutrition program that includes a reduced-calorie diet supervised by a dietician or nutritionist  · Exercise regimen to increase weight loss, and also to improve pulmonary reserve if bariatric surgery is recommended at the completion of the non-surgical course of therapy, supervised by an exercise therapist or other qualified professional  · Program must have a substantial face-to-face component (must no be entirely delivered remote)  · Medical course of weight loss therapy must be a cumulative total of 3 months (90 days) or longer in duration; it must be completed within 6 months of referral for bariatric surgery. Precertification for bariatric surgery will not be considered prior to completion of nutrition, exercise and behavioral therapy program as described above  · Documentation in the medical record of the member's participation in the weight loss program at each visit. Documentation should include medical records of the physician initial assessment of the member as well as 
the physician's assessment of the members progress at the completion of the program.   · A separate medical evaluation recommending the surgery from a physician other than the surgeon has been obtained; recommendation includes a medical clearance for bariatric surgery  · Patient is 18 years of age or older  · Patient is in good health except for obesity or obesity-associated comorbidities.  · Evidence of emotional stability - requiring psychological evaluation by a licensed mental health provider indicating there are no psychological contraindications to bariatric surgery. The evaluation must substantiate that the patient is able to understand, tolerate and comply with all phases of care and is committed to long-term follow-up requirements   Contraindications would include:  · Active illicit drug use and/or alcohol abuse/dependence (patient must be free of illicit drug use or alcohol abuse/dependence for a minimum  of 6 months prior to surgery)  · Active suicidal ideation or thoughts  · Borderline personality disorder  · Schizophrenia  · Terminal illness  · Uncontrolled depression  · If the above noted criteria are met, the procedure is approved for medical necessity.  · If any of the above noted criteria are not met, the file will be submitted to physician review for determination of medical necessity.  · The [facility] will follow the requirements of the health policy or benefit plan of the member as it relates to provider and member responsibility regarding utilization management.     Medical Weight Management, CPT 36023  · Is medical weight management office visit for obesity diagnosis covered? YES  · Was code verified with insurance? NO  · Are there any limitations to the number of visits allowed? No limitations, based on medical necessity    Nutritional Counseling, CPT 17761  · Is nutritional counseling for obesity diagnosis covered? YES  · Was code verified with insurance? NO  · Are there any limitations 
to the number of hours/classes allowed? No limitations, based on medical necessity    How many bariatric procedures are allowed in a lifetime? Based on medical necessity    Is pre-authorization or pre-certification required? Yes  Directions to obtain pre-authorization / pre-certification: Place [address] per current process      Benefit verification is not considered prior authorization and is not to be considered a guarantee of coverage.    We recommend patient call Member Services number on the back of their insurance card to verify coverage and benefits.    *On rare occasion the patient is given alternative details from what is listed above, please note specific differences (i.e. credentialing requirements), call providers office at [phone] with specific discrepancy for further review and investigation of those details.       
23

## 2023-07-12 ENCOUNTER — OFFICE VISIT (OUTPATIENT)
Dept: HEMATOLOGY/ONCOLOGY | Facility: CLINIC | Age: 53
End: 2023-07-12
Payer: MEDICAID

## 2023-07-12 ENCOUNTER — OFFICE VISIT (OUTPATIENT)
Dept: PALLIATIVE MEDICINE | Facility: CLINIC | Age: 53
End: 2023-07-12
Payer: MEDICAID

## 2023-07-12 ENCOUNTER — INFUSION (OUTPATIENT)
Dept: INFUSION THERAPY | Facility: HOSPITAL | Age: 53
End: 2023-07-12
Attending: INTERNAL MEDICINE
Payer: MEDICAID

## 2023-07-12 ENCOUNTER — DOCUMENTATION ONLY (OUTPATIENT)
Dept: HEMATOLOGY/ONCOLOGY | Facility: CLINIC | Age: 53
End: 2023-07-12

## 2023-07-12 VITALS
WEIGHT: 213.19 LBS | TEMPERATURE: 97 F | OXYGEN SATURATION: 98 % | HEIGHT: 67 IN | RESPIRATION RATE: 18 BRPM | BODY MASS INDEX: 33.46 KG/M2 | DIASTOLIC BLOOD PRESSURE: 64 MMHG | SYSTOLIC BLOOD PRESSURE: 98 MMHG | HEART RATE: 84 BPM

## 2023-07-12 VITALS
DIASTOLIC BLOOD PRESSURE: 64 MMHG | BODY MASS INDEX: 33.46 KG/M2 | OXYGEN SATURATION: 98 % | HEART RATE: 84 BPM | WEIGHT: 213.19 LBS | SYSTOLIC BLOOD PRESSURE: 98 MMHG | HEIGHT: 67 IN | TEMPERATURE: 97 F

## 2023-07-12 DIAGNOSIS — C53.0 MALIGNANT NEOPLASM OF ENDOCERVIX: ICD-10-CM

## 2023-07-12 DIAGNOSIS — F41.9 ANXIETY AND DEPRESSION: ICD-10-CM

## 2023-07-12 DIAGNOSIS — Z51.5 PALLIATIVE CARE ENCOUNTER: ICD-10-CM

## 2023-07-12 DIAGNOSIS — F41.9 ANXIETY: ICD-10-CM

## 2023-07-12 DIAGNOSIS — E03.9 HYPOTHYROIDISM, UNSPECIFIED TYPE: ICD-10-CM

## 2023-07-12 DIAGNOSIS — F32.A ANXIETY AND DEPRESSION: ICD-10-CM

## 2023-07-12 DIAGNOSIS — C53.9 RECURRENT CERVICAL CANCER: ICD-10-CM

## 2023-07-12 DIAGNOSIS — C50.811 MALIGNANT NEOPLASM OF OVERLAPPING SITES OF RIGHT BREAST IN FEMALE, ESTROGEN RECEPTOR POSITIVE: ICD-10-CM

## 2023-07-12 DIAGNOSIS — C73 PAPILLARY THYROID CARCINOMA: ICD-10-CM

## 2023-07-12 DIAGNOSIS — C50.919 PRIMARY MALIGNANT NEOPLASM OF BREAST WITH METASTASIS TO OTHER SITE, UNSPECIFIED LATERALITY: Primary | ICD-10-CM

## 2023-07-12 DIAGNOSIS — Z17.0 MALIGNANT NEOPLASM OF OVERLAPPING SITES OF RIGHT BREAST IN FEMALE, ESTROGEN RECEPTOR POSITIVE: ICD-10-CM

## 2023-07-12 DIAGNOSIS — C79.81 METASTATIC MALIGNANT NEOPLASM TO BREAST: ICD-10-CM

## 2023-07-12 DIAGNOSIS — R11.0 NAUSEA: ICD-10-CM

## 2023-07-12 DIAGNOSIS — C77.3 MALIGNANT NEOPLASM METASTATIC TO LYMPH NODE OF AXILLA: Primary | ICD-10-CM

## 2023-07-12 DIAGNOSIS — G89.3 NEOPLASM RELATED PAIN: Primary | ICD-10-CM

## 2023-07-12 PROCEDURE — 99999 PR PBB SHADOW E&M-EST. PATIENT-LVL V: CPT | Mod: PBBFAC,,, | Performed by: INTERNAL MEDICINE

## 2023-07-12 PROCEDURE — 99999 PR PBB SHADOW E&M-EST. PATIENT-LVL V: ICD-10-PCS | Mod: PBBFAC,,,

## 2023-07-12 PROCEDURE — 3078F DIAST BP <80 MM HG: CPT | Mod: CPTII,,,

## 2023-07-12 PROCEDURE — 99999 PR PBB SHADOW E&M-EST. PATIENT-LVL V: CPT | Mod: PBBFAC,,,

## 2023-07-12 PROCEDURE — 3008F BODY MASS INDEX DOCD: CPT | Mod: CPTII,,, | Performed by: INTERNAL MEDICINE

## 2023-07-12 PROCEDURE — 99215 PR OFFICE/OUTPT VISIT, EST, LEVL V, 40-54 MIN: ICD-10-PCS | Mod: S$PBB,,, | Performed by: INTERNAL MEDICINE

## 2023-07-12 PROCEDURE — 3044F HG A1C LEVEL LT 7.0%: CPT | Mod: CPTII,,, | Performed by: INTERNAL MEDICINE

## 2023-07-12 PROCEDURE — 3078F PR MOST RECENT DIASTOLIC BLOOD PRESSURE < 80 MM HG: ICD-10-PCS | Mod: CPTII,,,

## 2023-07-12 PROCEDURE — 96523 IRRIG DRUG DELIVERY DEVICE: CPT

## 2023-07-12 PROCEDURE — 25000003 PHARM REV CODE 250: Performed by: INTERNAL MEDICINE

## 2023-07-12 PROCEDURE — 99214 OFFICE O/P EST MOD 30 MIN: CPT | Mod: S$PBB,,,

## 2023-07-12 PROCEDURE — A4216 STERILE WATER/SALINE, 10 ML: HCPCS | Performed by: INTERNAL MEDICINE

## 2023-07-12 PROCEDURE — 99215 OFFICE O/P EST HI 40 MIN: CPT | Mod: PBBFAC | Performed by: INTERNAL MEDICINE

## 2023-07-12 PROCEDURE — 99215 OFFICE O/P EST HI 40 MIN: CPT | Mod: S$PBB,,, | Performed by: INTERNAL MEDICINE

## 2023-07-12 PROCEDURE — 1159F PR MEDICATION LIST DOCUMENTED IN MEDICAL RECORD: ICD-10-PCS | Mod: CPTII,,, | Performed by: INTERNAL MEDICINE

## 2023-07-12 PROCEDURE — 3044F PR MOST RECENT HEMOGLOBIN A1C LEVEL <7.0%: ICD-10-PCS | Mod: CPTII,,, | Performed by: INTERNAL MEDICINE

## 2023-07-12 PROCEDURE — 3078F PR MOST RECENT DIASTOLIC BLOOD PRESSURE < 80 MM HG: ICD-10-PCS | Mod: CPTII,,, | Performed by: INTERNAL MEDICINE

## 2023-07-12 PROCEDURE — 99214 PR OFFICE/OUTPT VISIT, EST, LEVL IV, 30-39 MIN: ICD-10-PCS | Mod: S$PBB,,,

## 2023-07-12 PROCEDURE — 99215 OFFICE O/P EST HI 40 MIN: CPT | Mod: PBBFAC,27

## 2023-07-12 PROCEDURE — 99999 PR PBB SHADOW E&M-EST. PATIENT-LVL V: ICD-10-PCS | Mod: PBBFAC,,, | Performed by: INTERNAL MEDICINE

## 2023-07-12 PROCEDURE — 3044F HG A1C LEVEL LT 7.0%: CPT | Mod: CPTII,,,

## 2023-07-12 PROCEDURE — 3074F SYST BP LT 130 MM HG: CPT | Mod: CPTII,,, | Performed by: INTERNAL MEDICINE

## 2023-07-12 PROCEDURE — 3074F PR MOST RECENT SYSTOLIC BLOOD PRESSURE < 130 MM HG: ICD-10-PCS | Mod: CPTII,,, | Performed by: INTERNAL MEDICINE

## 2023-07-12 PROCEDURE — 1159F MED LIST DOCD IN RCRD: CPT | Mod: CPTII,,, | Performed by: INTERNAL MEDICINE

## 2023-07-12 PROCEDURE — 63600175 PHARM REV CODE 636 W HCPCS: Performed by: INTERNAL MEDICINE

## 2023-07-12 PROCEDURE — 3074F SYST BP LT 130 MM HG: CPT | Mod: CPTII,,,

## 2023-07-12 PROCEDURE — 3044F PR MOST RECENT HEMOGLOBIN A1C LEVEL <7.0%: ICD-10-PCS | Mod: CPTII,,,

## 2023-07-12 PROCEDURE — 3074F PR MOST RECENT SYSTOLIC BLOOD PRESSURE < 130 MM HG: ICD-10-PCS | Mod: CPTII,,,

## 2023-07-12 PROCEDURE — 3008F PR BODY MASS INDEX (BMI) DOCUMENTED: ICD-10-PCS | Mod: CPTII,,,

## 2023-07-12 PROCEDURE — 3008F PR BODY MASS INDEX (BMI) DOCUMENTED: ICD-10-PCS | Mod: CPTII,,, | Performed by: INTERNAL MEDICINE

## 2023-07-12 PROCEDURE — 3008F BODY MASS INDEX DOCD: CPT | Mod: CPTII,,,

## 2023-07-12 PROCEDURE — 3078F DIAST BP <80 MM HG: CPT | Mod: CPTII,,, | Performed by: INTERNAL MEDICINE

## 2023-07-12 RX ORDER — IBUPROFEN 200 MG
1 TABLET ORAL
COMMUNITY
Start: 2023-07-03

## 2023-07-12 RX ORDER — IBUPROFEN 200 MG
1 TABLET ORAL
COMMUNITY
Start: 2023-07-05

## 2023-07-12 RX ORDER — ONDANSETRON 4 MG/1
4 TABLET, ORALLY DISINTEGRATING ORAL EVERY 12 HOURS PRN
Qty: 60 TABLET | Refills: 0 | Status: SHIPPED | OUTPATIENT
Start: 2023-07-12 | End: 2023-08-11

## 2023-07-12 RX ORDER — HEPARIN 100 UNIT/ML
500 SYRINGE INTRAVENOUS
Status: CANCELLED | OUTPATIENT
Start: 2023-07-12

## 2023-07-12 RX ORDER — NICOTINE 7MG/24HR
PATCH, TRANSDERMAL 24 HOURS TRANSDERMAL
COMMUNITY
Start: 2023-07-03

## 2023-07-12 RX ORDER — SODIUM CHLORIDE 0.9 % (FLUSH) 0.9 %
10 SYRINGE (ML) INJECTION
Status: DISCONTINUED | OUTPATIENT
Start: 2023-07-12 | End: 2023-07-12 | Stop reason: HOSPADM

## 2023-07-12 RX ORDER — SODIUM CHLORIDE 0.9 % (FLUSH) 0.9 %
10 SYRINGE (ML) INJECTION
Status: CANCELLED | OUTPATIENT
Start: 2023-07-12

## 2023-07-12 RX ORDER — HYDROCODONE BITARTRATE AND ACETAMINOPHEN 10; 325 MG/1; MG/1
1 TABLET ORAL EVERY 6 HOURS PRN
Qty: 120 TABLET | Refills: 0 | Status: SHIPPED | OUTPATIENT
Start: 2023-07-13 | End: 2023-08-10 | Stop reason: SDUPTHER

## 2023-07-12 RX ORDER — HEPARIN 100 UNIT/ML
500 SYRINGE INTRAVENOUS
Status: DISCONTINUED | OUTPATIENT
Start: 2023-07-12 | End: 2023-07-12 | Stop reason: HOSPADM

## 2023-07-12 RX ADMIN — Medication 10 ML: at 12:07

## 2023-07-12 RX ADMIN — HEPARIN 500 UNITS: 100 SYRINGE at 12:07

## 2023-07-12 NOTE — PATIENT INSTRUCTIONS
Take your Norco 10mg 1hr before you plan to sleep at night  Take Xanax an hour after Norco if your anxiety is not stable.   I will refill Norco and Zofran

## 2023-07-12 NOTE — NURSING
"Pt here for Mediport Flush. ____r__ chestwall mediport accessed with a 20g 1" tomlin via sterile technique.  Excellent blood return noted.  Flushed with 10ml NS and 5 ml heparin solution.  Needle D/C, site without redness, swelling, or drainage noted.  Dressing applied.  Patient tolerated well.  Patient to return to clinic in______    "

## 2023-07-12 NOTE — PROGRESS NOTES
Subjective:      DATE OF VISIT: 7/12/2023   ?   Patient ID:?Josey Flores is a 52 y.o. female.?? MR#: [id number]   ?   PRIMARY PROVIDER: Dr. Asencio  ?   CHIEF COMPLAINT:  Follow-up  ?   ONCOLOGIC DIAGNOSIS:      Stage IV cervical squamous cell carcinoma    stage IV, T2 N1b M1, invasive breast carcinoma, ER/TX+, HER2-    Papillary thyroid carcinoma status post total thyroidectomy on 08/31/2017, mpT1b N0     Cervical HSIL MIREILLE 3 s/p LEEP, 2017  ?   CURRENT TREATMENT:    Letrozole and palbociclib    PAST TREATMENT:    Palliative chemotherapy: cisplatin, paclitaxel and bevacizumab; 02/22/2021 cycle 1 day 1 -> bevacizumab maintenance after 6 cycles  Carboplatin, paclitaxel and pembrolizumab, C1D1 1/28/22; maintenance pembrolizumab, d/c 9/2022    INTERVAL EVENTS  June 2023 hospitalization for CVA currently discharged on Plavix in physical therapy residual left facial droop mild.  She has continued on breast cancer medications letrozole and Ibrance.  No interval infections bleeding and has had resolution of prior ear/jaw pain.  She is working on tobacco cessation with nicotine replacement patch    ROS  A comprehensive 14-point review of systems was reviewed with patient and was negative other than as specified above.   ?     Objective:      Physical Exam        Vitals:    07/12/23 1118   BP: 98/64   Pulse: 84   Resp: 18   Temp: 97.2 °F (36.2 °C)        ECOG:?0   General appearance: Generally well appearing, in no acute distress.   Head, eyes, ears, nose, and throat: moist mucous membranes.   Respiratory:  Normal work of breathing  Abdomen: nontender, nondistended.   Extremities: Warm, without edema.   Neurologic: Alert and oriented. Grossly normal strength, coordination, and gait. Mild left facial droop.  Skin: No rashes, ecchymoses or petechial lesion.   Psychiatric:  Normal mood and affect.          Laboratory:  ?   No visits with results within 1 Day(s) from this visit.   Latest known visit with results is:   Lab  Visit on 07/05/2023   Component Date Value Ref Range Status    WBC 07/05/2023 3.28 (L)  3.90 - 12.70 K/uL Final    RBC 07/05/2023 2.80 (L)  4.00 - 5.40 M/uL Final    Hemoglobin 07/05/2023 10.5 (L)  12.0 - 16.0 g/dL Final    Hematocrit 07/05/2023 30.8 (L)  37.0 - 48.5 % Final    MCV 07/05/2023 110 (H)  82 - 98 fL Final    MCH 07/05/2023 37.5 (H)  27.0 - 31.0 pg Final    MCHC 07/05/2023 34.1  32.0 - 36.0 g/dL Final    RDW 07/05/2023 14.3  11.5 - 14.5 % Final    Platelets 07/05/2023 145 (L)  150 - 450 K/uL Final    MPV 07/05/2023 11.3  9.2 - 12.9 fL Final    Immature Granulocytes 07/05/2023 0.6 (H)  0.0 - 0.5 % Final    Gran # (ANC) 07/05/2023 1.7 (L)  1.8 - 7.7 K/uL Final    Immature Grans (Abs) 07/05/2023 0.02  0.00 - 0.04 K/uL Final    Lymph # 07/05/2023 1.1  1.0 - 4.8 K/uL Final    Mono # 07/05/2023 0.4  0.3 - 1.0 K/uL Final    Eos # 07/05/2023 0.1  0.0 - 0.5 K/uL Final    Baso # 07/05/2023 0.03  0.00 - 0.20 K/uL Final    nRBC 07/05/2023 0  0 /100 WBC Final    Gran % 07/05/2023 52.5  38.0 - 73.0 % Final    Lymph % 07/05/2023 32.0  18.0 - 48.0 % Final    Mono % 07/05/2023 11.3  4.0 - 15.0 % Final    Eosinophil % 07/05/2023 2.7  0.0 - 8.0 % Final    Basophil % 07/05/2023 0.9  0.0 - 1.9 % Final    Platelet Estimate 07/05/2023 Decreased (A)   Final    Aniso 07/05/2023 Moderate   Final    Poly 07/05/2023 Occasional   Final    Differential Method 07/05/2023 Automated   Final    Sodium 07/05/2023 139  136 - 145 mmol/L Final    Potassium 07/05/2023 3.8  3.5 - 5.1 mmol/L Final    Chloride 07/05/2023 103  95 - 110 mmol/L Final    CO2 07/05/2023 25  23 - 29 mmol/L Final    Glucose 07/05/2023 104  70 - 110 mg/dL Final    BUN 07/05/2023 6  6 - 20 mg/dL Final    Creatinine 07/05/2023 1.5 (H)  0.5 - 1.4 mg/dL Final    Calcium 07/05/2023 8.9  8.7 - 10.5 mg/dL Final    Total Protein 07/05/2023 6.0  6.0 - 8.4 g/dL Final    Albumin 07/05/2023 3.3 (L)  3.5 - 5.2 g/dL Final    Total Bilirubin 07/05/2023 0.3  0.1 - 1.0 mg/dL Final     Alkaline Phosphatase 07/05/2023 67  55 - 135 U/L Final    AST 07/05/2023 12  10 - 40 U/L Final    ALT 07/05/2023 12  10 - 44 U/L Final    eGFR 07/05/2023 41 (A)  >60 mL/min/1.73 m^2 Final    Anion Gap 07/05/2023 11  8 - 16 mmol/L Final    Free T4 07/05/2023 1.01  0.71 - 1.51 ng/dL Final    TSH 07/05/2023 9.070 (H)  0.400 - 4.000 uIU/mL Final      Lab Results   Component Value Date    WBC 3.28 (L) 07/05/2023    HGB 10.5 (L) 07/05/2023    HCT 30.8 (L) 07/05/2023     (H) 07/05/2023     (L) 07/05/2023         Chemistry        Component Value Date/Time     07/05/2023 1037    K 3.8 07/05/2023 1037     07/05/2023 1037    CO2 25 07/05/2023 1037    BUN 6 07/05/2023 1037    CREATININE 1.5 (H) 07/05/2023 1037     07/05/2023 1037        Component Value Date/Time    CALCIUM 8.9 07/05/2023 1037    ALKPHOS 67 07/05/2023 1037    AST 12 07/05/2023 1037    ALT 12 07/05/2023 1037    BILITOT 0.3 07/05/2023 1037    ESTGFRAFRICA 60 07/23/2022 1922    EGFRNONAA 52 (A) 07/23/2022 1922          ? IMAGING   Results for orders placed or performed during the hospital encounter of 05/03/23 (from the past 2160 hour(s))   NM PET CT Routine FDG    Impression    There is a focal mildly hypermetabolic ground-glass opacity in the left lower lobe which is new from the prior examination.  This is more likely infectious/inflammatory in nature.  Short-term CT chest follow-up is advised.    The study is otherwise unremarkable with no additional areas of abnormal FDG uptake.      Electronically signed by: Zaid Brito MD  Date:    05/03/2023  Time:    16:41     *Note: Due to a large number of results and/or encounters for the requested time period, some results have not been displayed. A complete set of results can be found in Results Review.         Results for orders placed or performed during the hospital encounter of 06/02/23 (from the past 2160 hour(s))   MRI BRAIN WITHOUT CONTRAST    Impression    Right frontal and  parietal territory acute infarcts as above.  These may be embolic type infarcts.    COMMUNICATION  This critical result was discovered/received at 18:15.  The critical information above was relayed directly by me by telephone to Leila Torres RN on 06/03/2023 at 18:20.      Electronically signed by: Raphael Morris  Date:    06/03/2023  Time:    18:21   MRA Brain    Impression    Exam is severely degraded by motion.  Question abnormality of the right MCA although this is favored to relate to motion.  Given right-sided acute infarcts follow-up CT angiography of the head would be recommended as soon as clinically feasible.    COMMUNICATION  This critical result was discovered/received at 18:15.  The critical information above was relayed directly by me by telephone to Leila Torres RN on 06/03/2023 at 18:20.      Electronically signed by: Raphael Morris  Date:    06/03/2023  Time:    18:22     *Note: Due to a large number of results and/or encounters for the requested time period, some results have not been displayed. A complete set of results can be found in Results Review.       PATHOLOGY  2/2021  Station 7 lymph node, fine needle aspiration (8 smears, 1 cell block):       -  Positive for malignant cells, morphologically consistent with   metastatic squamous cell carcinoma     Assessment/Plan:       No diagnosis found.            Plan:     # metastatic squamous cell carcinoma of the cervix SCC cervix:  history of HSIL Status post LEEP 2017. In December 2020 follow-up with gynecology with new spotty vaginal bleeding/discharge with biopsy 12/7/20 showing squamous cell carcinoma consistent with cervical primary.  MRI pelvis performed showing locally advanced disease with 4.9 cm cervical mass with right parametrial involvement and enlarged right external iliac lymph node 1.5 x 1.2 cm. PET-CT showing avidity of cervical mass with extension to lower uterine segment, right vaginal focus of avidity and lymphadenopathy  including right internal external iliac, periaortic, pericaval.  There is the new hypermetabolic lymphadenopathy in right subcarinal 2.3 cm SUV 5.8. Small 9 mm right paratracheal S base slightly increased no FDG avid SUV 2.4.  01/29/2021 multidisciplinary tumor board discussion of her case with review of imaging; concern for new avid lymphadenopathy particularly in right subcarinal may be most consistent with metastatic cervical squamous cell carcinoma; tumor board recommendation for biopsy to confirm for prognostic information as well as given other concomitant malignancy to exclude alternative histology, although felt less likely metastatic breast given this is been well controlled and primarily bony disease involvement.  We discussed recommendation for systemic chemotherapy given advanced cervical squamous cell carcinoma with cisplatin, paclitaxel and bevacizumab with discontinuation of palbociclib but can continue aromatase inhibitor; taxane may also be active for her concomitant metastatic breast cancer.   - EBUS with biopsy 2/18/21, station 7 consistent with metastatic squamous cell carcinoma of cervical origin.  - STRATA requested on cervical SCC, ERBB3 <5% felt to be subclonal per report, PIK3CA amplification, XY0650, KDM6A, FRANK, TMB low, PDL1 high may indicated sensitivity to check point inhibitor in future regimen.  - audiology exam 2/18/21, recommended that she let us know if any perceived change in her hearing throughout treatment and recommend repeat testing throughout to ensure no ototoxicity.  - [facility] germline genetic testing returned negative for mutation, provided to patient.  - cycle 1 day 1 cisplatin paclitaxel and bevacizumab on 02/22/2021  Repeat scans 04/26/2021 showed excellent improvement in thoracic and pelvic lymphadenopathy and primary cervical mass.  She is asymptomatic from this no further bleeding.  Recommended continuation of her current regimen which she is tolerating well with  supportive care, IV fluids 3 times weekly per patient preference.  - restaging after cycle 6 she has continued improvement in metastatic cervical squamous cell carcinoma; avidity in left vulvar area diminished associated with known infection.  She has required substantial supportive care on chemotherapy and recommend given sustained improvement on scans continuing and absence of chemotherapy with bevacizumab alone and continue close surveillance.  She would like to continue IV fluids weekly as needed.    We reviewed in detail restaging PET-CT January 2022 notable for uptake in cervical region concerning for oligo progression/recurrence of her disease.  Previously biopsy proven metastatic cervical cancer in lungs without evidence of recurrent mass/lymphadenopathy/avidity in this region or otherwise.    Given prior PDL1 high disease CPS >1 we decided to proceed with chemoimmunotherapy per Keynote 826, carboplatin, paclitaxel and pembrolizumab.   She completed 6 cycles of carboplatin paclitaxel with pembrolizumab (1 cycle without pembrolizumab from unclear reasons with my colleague) with interval brachytherapy with Dr. V[facility]quez at [facility].    Restaging PET scan with diminished avidity vaginal cuff status post brachytherapy.  No new areas concerning for active disease.  We discussed consideration of holding further maintenance immunotherapy at this time given no evidence of recurrent / progressive metastatic disease and is amenable to this.  Will continue her breast cancer treatment per below.    5/2023 last restaging PET without evidence of recurrent progressive metastatic disease but there were changes in lung for which we obtain short interval follow-up CT chest with resolution left lower lobe abnormalities most likely infectious inflammatory.  Unfortunately she is had interval CVA now on Plavix undergoing physical therapy recommend follow-up with this and Neurology and tobacco cessation.   Restaging scans in October 2023 if otherwise clinically stable from metastatic disease standpoint.  She is continued on letrozole and Ibrance, anemia and leukopenia noted labs taken at end of Ibrance cycle.  Infectious bleeding precautions noted.      # Hypothyroidism:  T4 normal range. Increased dose of levothyroxine currently on 275 mcg daily.        Macrocytosis: Possibly related to thyroid disorder will continue to monitor.  She is on vitamin-B supplement.  No new anemia.    # neuropathy:  Mild, will monitor with re-initiation of chemotherapy per above.    # metastatic breast cancer ER positive HER2 negative:  Had been on systemic therapy with palbociclib and letrozole.  Tolerating well.  Had been on hold with ENT issues okay to restart at this time.  She understands if concern for active infection to notify us as may need to be held.    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    # history of papillary thyroid carcinoma status post total thyroidectomy on 08/31/2017: s/p total thyroidectomy on levothyroxine.     # tobacco abuse:  Currently working on tobacco cessation after CVA June 2023 on nicotine replacement, congratulated on effort and continued cessation encouraged.    Follow-Up:       Route Chart for Scheduling    Med Onc Chart Routing      Follow up with physician 6 weeks and 3 months.   Follow up with MIGUEL    Infusion scheduling note    Injection scheduling note port flush q6wks   Labs CBC, CMP, TSH and free T4   Scheduling:  Preferred lab:  Lab interval:     Imaging PET scan   prior to 3 mo visit   Pharmacy appointment    Other referrals        Therapy Plan Information  INF FLUIDS  IV Fluids  sodium chloride 0.9% bolus 1,000 mL  1,000 mL, Intravenous, PRN  Flushes  sodium chloride 0.9% flush 10 mL  10 mL, Intravenous, PRN  heparin, porcine (PF) 100 unit/mL injection flush 500 Units  500 Units, Intravenous, PRN    PORT  FLUSH  Flushes  heparin, porcine (PF) 100 unit/mL injection flush 500 Units  500 Units, Intravenous, Every visit  sodium chloride 0.9% flush 10 mL  10 mL, Intravenous, Every visit

## 2023-07-12 NOTE — PROGRESS NOTES
Palliative Medicine         Follow up    SUBJECTIVE:     Josey Flores is a 52 y.o. female with history of metastatic breast cancer, thyroid cancer post thyroidectomy (2017), and cervical cancer (2017). She is on surveillance on Letrozole and Ibrance. PET scan in May 2023 showed no evidence of recurrence or metastatic disease. She had a stroke with left-sided weakness (June 02, 2023). She is followed by Dr. Ba.     Chief complaint: Bilateral Rib Pain follow up  7/12/2023: Pt attended clinic with her partner, Alessio. She was in no acute distress. Vital signs stable on room air. She stated she is tolerating NRT, post stroke rehabilitation/physiotherapy, and doing well overall considering her recent hospital admission for UTI (No admission record on Epic). She denied fevers/chills, hematuria, and dysuria, since completing her antibiotics course three days ago. She reported diarrhea, which she is currently managing with Lomotil.   She stated her bilateral rib pain is stable on Norco 10mg QID PRN. She is no longer taking MS ER. However, she stated she sometimes wakes up in pain when she does not take Norco at night. She also expressed anxiety regarding her CT scan results. She stated she will like feel better once she discusses her results with her. She reported stable anxiety/depression with Xanax and Duloxetine.       LA  Reviewed and Summarized:        6/13/2023: Pt attended clinic with her partner, Alessio. She was in no acute distress. Vital signs stable on room air.    She reported persistent pain under her ribs bilaterally. She stated her Norco was not reinstated following her discharge from the hospital, and MSER 15mg once a day has not been effective for pain, though it helps with sleep. She stated she is aware that her PET scan showed no evidence of disease progression, but showed infection/inflammation in her lungs. She stated she is pleased it is not cancer, and she anxious to see the results of her  upcoming CT Chest. She also reported anxiety, dyspnea, fatigue, insomnia, and restlessness which she attributes to persistent pain. She also reported running out of Xanax. She stated she is still depressed, but her mood has improved since starting Duloxetine. Her partner expressed feeling overwhelmed with appointments and driving to [place] for appointments with Neurology. He sought assistance with getting a neurology referral with [facility], but was told there was no available appointments for new pts. I informed pt and her partner that I am unsure of the process of scheduling Neurology referral at [facility], but I can ask my nursing staff to assist.     We discussed continuing opioid weaning, as her rib pain has not been shown to be cancer-related. Pt agreed to trial of stopping MSER 15mg, and continuing with Norco 10mg QID PRN for pain management. We also discussed chronic pain referral once her pain is stable. Pt and her partner agreed.     LA  Reviewed and Summarized:        Past visits:    4/18/2023: Pt attended clinic with her partner, Alessio. She reported new bilateral under the rib and lower back pain that has started and worsened over the last two months. She stated she discussed this with Dr. Ba during her appointment on April 13, and a PET scan was ordered to assess for recurrent or metastatic disease. She denied, fever, chills, sweats, or dyspnea. She stated she has been taking Norco 10 four times a day, and MS ER 15mg Daily at night. She states she does not take MS ER in the morning due to fear of sleeping during the day. Pt also reported diarrhea, which has improved with Lomotil, in addition to increased anxiety and depression due to worsening pain.   I encouraged her to take Norco 10mg QID PRN for pain, and MS ER 15mg BID if her pain continues to worsen, and she is fine with mild sedation. Pt stated she is only comfortable with taking MSER at night. I noted she last filled her  Norco   Pt stated she is willing to try an antidepressant for her mood, and she had stopped taking Wellbutrin about a month ago due to palpitations. I informed her that we will complete an EKG to assess her Qtc interval prior to starting an antidepressant. Her last EKG was in May 2021 showing Qtc of 435. Pt agreed to complete EKG.     2/14/2023: Patient comes to clinic with her significant other Alessio. We discussed her recent visits with Dr. Asencio and Deo. She is in good spirits as her Restaging PET scan 1/11/2023 without evidence of recurrent metastatic disease active and no new areas concerning for active disease. She noted she was instructed to continue current treatment for breast cancer and surveillance for cervical cancer. She denies any issues and notes that she continues to be active and her and significant other are going out to eat after visit. She notes that she has intermittent pelvic pain in which she attributes to radiation but notes that current pain medication effective in managing. She notes that it does not happen often. In the setting of stable scans and continued improvement clinically, we dicussed continuing opoid wean. Patient currently taking MSER 15mg po BID and taking 3-4 doses of Norco daily. Will decrease MSER to 15mg po Daily and continue current hydrocodone regimen not to exceed 4 doses in a day. Will follow up in 4 weeks to continue opoid wean. No new scripts needed as MSER and hydrocodone were just filled on 2/8/23.     12/1/22: Patient comes to clinic with her significant other Alessio. She is still feeling sad because of her grand daughter since she moved out. She noted that she stopped taking her Wellbutrin but wants to start back taking it to help with her mood also to help her with the process of quitting smoking. Also she notes she has been dealing with worsening pain to her left ear. She saw Otolaryngologist recently and he noted no signs of changes on CT and offered patient  surgery or non surgical management. Patient notes that she did not want surgery at that time because her doctor made her aware that the procedure may help but there is a possibility that it may not. She notes that the pain and drainage has worsened and she is now thinking about proceeding with surgery even if there is a risk that it will not work. She has been in touch with her Otolaryngologist's office to schedule appointment. She last saw Dr. Asencio 10/13/22 in which there were no changes and she was to continue on current treatment withLetrozole and palbociclib. Her visit with Gyn/onc also noted no new changes and everything was stable. During exam patient reports pelvic pain but notes that medication has been effective in managing it. Being that there were no changes with all of her MD visits, I spoke with patient about continuing plan from her previous PM visit with weaning MSER. She is in agreement, we will wean down to MSER 15mg po BID. I did make patient aware that medications provided by me were not for her ear pain or or acute issues she may experience but it is indicated for malignant pain. I reinforced education about taking medications as prescribed and for what it is indicated for. I made her aware that we could not send in another early prescription as my counterpart had to do in November. On 11/1/22,  patient sent a message noting she ran out of her medication before scheduled visit. She reported that she was admitted to the [facility] due to abdominal pain and then when she was discharged she cut her foot very badly and had to receive stiches. Due to these issues, she used her medications more frequently than she was prescribed. She was taking 2 tabs of MSER 30mg BID instead of 1 BID. She was taking hydrocodone 4-5x/ day. She admitted that she was not supposed to do this and will not do this again. She was hoping that provider would write the 15mg ER tabs to hold her over. She explained why  she would not write for the 15mg and typically do not write early refills. This is the second time that she has taken ER medications more frequently than prescribed. She was counseled gain on the risks with this behavior and this would be her last warning as well as last early refill. Moving forward if she takes opioids other than rx this is grounds for dismissal from our clinic given issues with impulse control and safety. She expressed understanding and said that her  has also scolded her. She knows this was not a good choice and continues to apologize and promised she will not do it again. My partner explained our boundaries in clinic are not punitive but rather in place for her safety. I was present with my counterpart for the conversation and agreed that early refills will be not be permitted moving forward    10/06/2022  She comes to clinic with DEYSI Mccallum today. She is down because her granddaughter is not in a good situation but otherwise feeling okay. Since we last saw each other her pain has improved which is likely related to the positive response to treatment. She would like to begin weaning the MSER back to 30mg BID and hopefully continue to decrease further. She is still taking hydrocodone 10mg QID with good response. Her mood improved with the resumption of Wellbutrin and is still cutting back on her tobacco use. She has been having ear pain which is being evaluated by ENT and is waiting to hear when her CT is scheduled. There seems to have been a charting error with her Letrozole resulting in d/c the order and unable to refill. I will reach out to oncology to reorder. We will follow up in 2 months and she will message me for refills whether she would like to continue weaning before then. We do not have HCPOA on file even though she remembers bringing to clinic. She will bring again next visit and confirms that her sister David is HCPOA.    09/01/22: Ms. Flores comes to clinic today with her fidavid  "Alessio. She reports that her lower abdominal pain has worsened since brachytherapy started several weeks ago. She reports spontaneous lower abdominal pain not related to food but sometimes exacerbated by BM. She denies constipation. She has taken an extra dose of MSER at bedtime to help with sleep. We talked about the risks associated with this and would advise against doing this again. She is taking hydrocodone q4hr sometimes waking up at night and needing another dose averaging 4-6 in a day. I recommend we increase the long acting dose in hopes for better control and less PRN usage. She says her sleep is poor because this is when the pain intensifies (lying flat) but also anxiety and sadness since her granddaughter moved out of the home. She is taking Xanax HS which helps. She was taking Wellbutrin in the last but not sure why she stopped. For now lets not resume this and evaluate at her next visit.    06/16/2022: Patient doing okay.  No new issues since her last visit.  She continue to have some lower GI pains describes now as "cramping after a BM".  Previously, her discomfort was after eating.  She has been referred to GI.  She has some fatigue but managing.  Pain is controlled on current regimen.  She has upcoming scans and appointment with Dr. Asencio.  She has some anxiety and life stressors affecting her situation.  Her son and his ex-girfriend (now in [place]) can no longer care for their 4 year old daughter.  She and her partner are assuming the parental rights of her granddaughter.  They seem to be coping as well as can be expected.  Overall, she is doing okay.  We will continue to follow at quarterly intervals.  She knows to reach out if her situation changes or symptom burden worsens.    03/10/2022: Overall, patient seems to be doing okay.  She has some new GI issues with abdominal pain after eating and intermittent diarrhea not felt to be related to her disease or treatment.  Oncology discussed GI referral " and she is considering this if no improvement.  We are adjusting her pain medication regimen today mostly to cut back on her prn Norco which she continues to use consistently at 5-6 tablets/day.  She knows she can reach out for additional questions or concerns.     01/06/2022: Patient is a 51 y.o. year old female presenting with for palliative follow up for her metastatic breast cancer, SCC of the cervix. Treatments currently on hold due to recurrent ear infection. She is anxious about results of her recent scan and fearful of disease progression while off treatment.  Otherwise, her pain continues to be controlled on her current regimen.  No new symptoms or complaints today.    ONCOLOGY DIAGNOSES:  Stage IV cervical squamous cell carcinoma   stage IV, T2 N1b M1, invasive breast carcinoma, ER/CT+, HER2-   Papillary thyroid carcinoma status post total thyroidectomy on 08/31/2017, mpT1b N0    Cervical HSIL MIREILLE 3 s/p LEEP, 2017    Patient looks and feels better compared to last visit.  She walked into her appt.  Ear is better and she is being followed by ENT.  She has resumed treatment for both her cervical and breast cancer and is followed closely by medical oncology and gyn oncology.  Pain continues to be controlled on current medication regimen.  She is having knee issues with swelling and likely OA.  We discussed referral to orthopedics.  She also plans to follow up with neurosurgery.  Overall, no major changes and no new issues.     LA TERESSA reviewed and summarized:      OBJECTIVE:     Review of Systems   Constitutional:  Negative for malaise/fatigue.   Respiratory:  Negative for cough, shortness of breath and wheezing.    Cardiovascular:  Negative for chest pain and palpitations.   Gastrointestinal:  Positive for nausea. Negative for abdominal pain, constipation and diarrhea.   Musculoskeletal:         Bilateral rib pain   Neurological:  Positive for weakness (S/P stroke. Left-sided weakness). Negative for  dizziness, tingling, sensory change, speech change, seizures, loss of consciousness and headaches.   Psychiatric/Behavioral:  Positive for depression. Negative for suicidal ideas. The patient is nervous/anxious and has insomnia.       Review of Symptoms      Symptom Assessment (ESAS 0-10 Scale)  Pain:  7  Dyspnea:  0  Anxiety:  7  Nausea:  6  Depression:  8  Anorexia:  0  Fatigue:  0  Insomnia:  7  Restlessness:  7  Agitation:  8     CAM / Delirium:  Negative  Constipation:  Negative  Diarrhea:  Positive (Pt uses Lomotil PRN.Improved)    Anxiety:  Is nervous/anxious  Constipation:  No constipation    Bowel Management Plan (BMP):  Yes      Pain Assessment:    Location(s): chest and back (New substernal/rib pain)    Back       Location: lower        Quality: Aching        Quantity: 7/10 in intensity        Chronicity: Onset 2 month(s) ago, gradually worsening        Aggravating Factors: Activity        Alleviating Factors: Opiates       Associated Symptoms: Myalgias  Chest       Location: bilateral        Quality: Sharp        Quantity: 7/10 in intensity        Chronicity: Onset 2 month(s) ago, gradually worsening since Pt reported bilateral under the rib and lower back pain, started two months ago and worsening. She stated Dr. Ba is aware, and has ordered a PET Scan on May 3, 2023 to assess for recurrent or metastatic disease.        Aggravating Factors: Activity        Alleviating Factors: Opiates        Associated Symptoms: Myalgias    Modified Osmar Scale:  0    ECOG Performance Status stGstrstastdstest:st st1st Living Arrangements:  Lives with spouse    Psychosocial/Cultural:   See Palliative Psychosocial Note: No  Lives with her significant other of 16 years: they have 3 children combined, 2 sons from previous marriage. Granddaughter recently moved out of the home  **Primary  to Follow**  Palliative Care  Consult: No    Advance Care Planning   Advance Directives:   Living Will: Yes        Copy on  chart: Yes    LaPOST: Yes    Do Not Resuscitate Status: Yes    Agent's Name:  David Acevedo, sister, [phone]    Decision Making:  Patient answered questions  Goals of Care: What is most important right now is to focus on remaining as independent as possible, symptom/pain control, curative/life-prolongation (regardless of treatment burdens). Accordingly, we have decided that the best plan to meet the patient's goals includes continuing with treatment.      Physical Exam:  Vitals:    Physical Exam  Vitals and nursing note reviewed.   Constitutional:       General: She is not in acute distress.     Appearance: Normal appearance. She is normal weight. She is ill-appearing.   HENT:      Head: Normocephalic and atraumatic.      Comments: Left facial droop     Nose: Nose normal. No congestion or rhinorrhea.      Mouth/Throat:      Mouth: Mucous membranes are moist.      Pharynx: Oropharynx is clear. No oropharyngeal exudate or posterior oropharyngeal erythema.   Eyes:      General: No scleral icterus.        Right eye: No discharge.         Left eye: No discharge.      Conjunctiva/sclera: Conjunctivae normal.      Pupils: Pupils are equal, round, and reactive to light.   Cardiovascular:      Rate and Rhythm: Normal rate and regular rhythm.      Pulses: Normal pulses.      Heart sounds: Normal heart sounds. No murmur heard.    No gallop.   Pulmonary:      Effort: Pulmonary effort is normal. No respiratory distress.      Breath sounds: Normal breath sounds. No stridor. No wheezing.   Chest:      Chest wall: No tenderness.   Abdominal:      General: Bowel sounds are normal. There is no distension.      Palpations: Abdomen is soft.      Tenderness: There is no abdominal tenderness.   Musculoskeletal:         General: Tenderness (lateral ribs L>R) present. No swelling. Normal range of motion.      Cervical back: Normal range of motion and neck supple.      Right lower leg: No edema.      Left lower leg: No edema.       Comments: S/P stroke. Left arm and left leg weakness   Skin:     General: Skin is warm and dry.      Capillary Refill: Capillary refill takes less than 2 seconds.      Coloration: Skin is not jaundiced or pale.      Findings: No rash.   Neurological:      Mental Status: She is alert and oriented to person, place, and time.      GCS: GCS eye subscore is 4. GCS verbal subscore is 5. GCS motor subscore is 6.      Motor: Weakness (s/p stroke June 02/ Left sided weakness) present.      Gait: Gait abnormal (Limping).   Psychiatric:         Attention and Perception: Attention normal.         Mood and Affect: Mood is anxious.         Speech: Speech normal.         Behavior: Behavior normal.         Thought Content: Thought content normal.         Cognition and Memory: Cognition normal.         Judgment: Judgment normal.        Radiology and laboratory data reviewed    ASSESSMENT/PLAN:     Malignant neoplasm of overlapping sites of right breast in female, estrogen receptor positive  -Followed by Dr. Asencio  -On Surveillance- Continues Letrozole and Ibrance  -PET scan 5/03: There is a focal mildly hypermetabolic ground-glass opacity in the left lower lobe which is new from the prior examination. This is more likely infectious/inflammatory in nature. Short-term CT chest follow-up is advised.  -CT chest 07/07/23: 1. Interval resolution of the left lower lobe ground-glass opacity seen on the prior PET-CT. No evidence for thoracic metastatic disease. Severe centrilobular emphysema.  -Repeat PET scan Oct. 2023    Malignant neoplasm of endocervix  -Followed by  noted JUAN FRANCISCO  -Restaging scan 5/03/2023 without evidence of recurrent metastatic disease active  -Holding further maintenance immunotherapy at this time given no evidence of recurrent / progressive metastatic disease    -Surveillance for cervical cancer.    Neoplasm-related pain   -Stable Rib pain-Unclear etiology, as scans have been stable.  -Stop MSER as it is not  effective, and trial opioid weaning in the setting of stable scans.  - Pt prefers to stop long-acting than decrease Norco frequency.  -Continue Norco 10mg QID PRN not to exceed 4 doses in a day  -Pt open to chronic pain management referral given no evidence of cancer recurrence or metastasis  -Will continue weaning, as chronic pain referral may take at two months.   -Refilled Zofran 4mg BID PRN for nausea  UDS-7/12/2023  Narcan rx- Pt stated she knows how to use it     Anxiety/Depression  -Stable  -Continue Xanax 0.5mg-1mg BID PRN  -Continue Duloxetine 30mg Daily. Will maintain current dose due to renal function.    Palliative Care Encounter  -HCPOA:  David Acevedo, sister, [phone]  -Code Status: DNR; LaPOST completed and uploaded in EMR  -[facility]-Continue cancer treatment with surveillance, symptom management, improve functional status with rehabilitation.      Follow up: 4 weeks for pain assessment    30 minutes of total time spent on the encounter, which includes face to face time and non-face to face time preparing to see the patient (eg, review of tests), Obtaining and/or reviewing separately obtained history, Documenting clinical information in the electronic or other health record, Independently interpreting results (not separately reported) and communicating results to the patient/family/caregiver, or Care coordination (not separately reported).    Signature: SUZIE GOTTI NP

## 2023-07-13 RX ORDER — ALPRAZOLAM 1 MG/1
.5-1 TABLET ORAL 2 TIMES DAILY PRN
Qty: 60 TABLET | Refills: 0 | Status: SHIPPED | OUTPATIENT
Start: 2023-07-13 | End: 2023-08-10 | Stop reason: SDUPTHER

## 2023-07-18 NOTE — PROGRESS NOTES
20 year old female patient presents today for a screeningcolonoscopy. Patient denies a history of colon polyps.  Admits a family history of colon polyps. Patient's older sister (she is 2 years older) has a history of precancerous polyps. Last Colonoscopy performed 07.28.2017 without any findings of colon polyps. Patient currently admits 1-2 bowel movements per day, without strain. Sometimes she will have a bowel movement every other day and this is not unusual. Her stools are normal and formed without the presence of blood, mucus, and melena. She denies any pruritus ani or rectal pain. Sn instructed and demonstrated nebulizer usage to patient, patient stated understanding

## 2023-07-18 NOTE — PROGRESS NOTES
Clinical Prescription Medication Monitoring   Collection of  Urine Medication Screening for Palliative Clinic    Patient in office today for palliative care visit  EP follow up for cancer related pain management.    Pt signed pain contract, understands this urine drug screening is for palliative protocol, medication monitoring program, Pt agrees to process      Date:  7-     Urine Drug Screening: Med Monitoring     ICD 10Code Used: G89.3, A85.43, F11.20, 279.891      Location of this visit : [facility]     Orders received by Provider Named :  ADRYAN Coy Patient ID was preformed by Patient : Yes     Urine  Specimen obtained  by nurse:  VERENA Cummings     Patient verified  full name and date of birth, Lables placed on specimen: Yes     Urine was successfully collected with in normal ranges: Yes 97%    Labeled and sealed in presence of pt. : Yes     Nurse called [facility] , Rep.with [facility] stated specimens will be picked up   Date: 7-      Nurse has specimen secured in cancer center labs in [facility] center lab: Yes    [facility] Confirmation Number :    [id number]    Results will be scanned under the Lab tab in the pt's chart once sent back from [facility]. Yes    Nurse/VERENA Cummings

## 2023-07-24 ENCOUNTER — OFFICE VISIT (OUTPATIENT)
Dept: INTERNAL MEDICINE | Facility: CLINIC | Age: 53
End: 2023-07-24
Payer: MEDICAID

## 2023-07-24 VITALS
HEIGHT: 67 IN | SYSTOLIC BLOOD PRESSURE: 104 MMHG | WEIGHT: 212.31 LBS | BODY MASS INDEX: 33.32 KG/M2 | HEART RATE: 96 BPM | DIASTOLIC BLOOD PRESSURE: 62 MMHG | OXYGEN SATURATION: 97 % | TEMPERATURE: 99 F

## 2023-07-24 DIAGNOSIS — I63.9 CEREBROVASCULAR ACCIDENT (CVA), UNSPECIFIED MECHANISM: Primary | ICD-10-CM

## 2023-07-24 DIAGNOSIS — R53.1 LEFT-SIDED WEAKNESS: ICD-10-CM

## 2023-07-24 DIAGNOSIS — I95.9 HYPOTENSION, UNSPECIFIED HYPOTENSION TYPE: ICD-10-CM

## 2023-07-24 DIAGNOSIS — Z72.0 TOBACCO ABUSE DISORDER: ICD-10-CM

## 2023-07-24 DIAGNOSIS — I74.9 EMBOLIC INFARCTION: ICD-10-CM

## 2023-07-24 PROCEDURE — 3078F DIAST BP <80 MM HG: CPT | Mod: CPTII,,, | Performed by: FAMILY MEDICINE

## 2023-07-24 PROCEDURE — 99999 PR PBB SHADOW E&M-EST. PATIENT-LVL V: ICD-10-PCS | Mod: PBBFAC,,, | Performed by: FAMILY MEDICINE

## 2023-07-24 PROCEDURE — 3044F PR MOST RECENT HEMOGLOBIN A1C LEVEL <7.0%: ICD-10-PCS | Mod: CPTII,,, | Performed by: FAMILY MEDICINE

## 2023-07-24 PROCEDURE — 3008F PR BODY MASS INDEX (BMI) DOCUMENTED: ICD-10-PCS | Mod: CPTII,,, | Performed by: FAMILY MEDICINE

## 2023-07-24 PROCEDURE — 99999 PR PBB SHADOW E&M-EST. PATIENT-LVL V: CPT | Mod: PBBFAC,,, | Performed by: FAMILY MEDICINE

## 2023-07-24 PROCEDURE — 3074F PR MOST RECENT SYSTOLIC BLOOD PRESSURE < 130 MM HG: ICD-10-PCS | Mod: CPTII,,, | Performed by: FAMILY MEDICINE

## 2023-07-24 PROCEDURE — 99214 PR OFFICE/OUTPT VISIT, EST, LEVL IV, 30-39 MIN: ICD-10-PCS | Mod: S$PBB,,, | Performed by: FAMILY MEDICINE

## 2023-07-24 PROCEDURE — 99215 OFFICE O/P EST HI 40 MIN: CPT | Mod: PBBFAC | Performed by: FAMILY MEDICINE

## 2023-07-24 PROCEDURE — 3078F PR MOST RECENT DIASTOLIC BLOOD PRESSURE < 80 MM HG: ICD-10-PCS | Mod: CPTII,,, | Performed by: FAMILY MEDICINE

## 2023-07-24 PROCEDURE — 3008F BODY MASS INDEX DOCD: CPT | Mod: CPTII,,, | Performed by: FAMILY MEDICINE

## 2023-07-24 PROCEDURE — 3074F SYST BP LT 130 MM HG: CPT | Mod: CPTII,,, | Performed by: FAMILY MEDICINE

## 2023-07-24 PROCEDURE — 1159F PR MEDICATION LIST DOCUMENTED IN MEDICAL RECORD: ICD-10-PCS | Mod: CPTII,,, | Performed by: FAMILY MEDICINE

## 2023-07-24 PROCEDURE — 1159F MED LIST DOCD IN RCRD: CPT | Mod: CPTII,,, | Performed by: FAMILY MEDICINE

## 2023-07-24 PROCEDURE — 99214 OFFICE O/P EST MOD 30 MIN: CPT | Mod: S$PBB,,, | Performed by: FAMILY MEDICINE

## 2023-07-24 PROCEDURE — 3044F HG A1C LEVEL LT 7.0%: CPT | Mod: CPTII,,, | Performed by: FAMILY MEDICINE

## 2023-07-24 NOTE — PROGRESS NOTES
Subjective:       Patient ID: Josey Flores is a 52 y.o. female.    Chief Complaint: Follow-up (CVA)    Follow-up left CVA with left arm left leg weakness increased blurring in the vision of her left eye  she had some blurring of h left eye before the CVA that has worsened.  She is followed by ophthalmology.  She is also scheduled for neurology follow-up.  She is currently physical therapy.  She is doing cigarette cessation.  She continues to smoke 1 pack of cigarettes a day.  She recently started nicotine patch.  Discuss also using nicotine gum and completely discontinuing  cigarettes.  She reports her blood pressures been up in down at home.  Sometimes it is in the 90/60 range.  She reports she feels drained 1 a pressure is.  She also reports that time it pressure is in the 180s and she has a headache she is on no blood pressure medications.    Follow-up  Associated symptoms include headaches and weakness. Pertinent negatives include no chest pain, chills, coughing or fever.   Review of Systems   Constitutional:  Negative for chills and fever.   Respiratory:  Negative for cough, chest tightness, shortness of breath and wheezing.    Cardiovascular:  Negative for chest pain, palpitations and leg swelling.   Gastrointestinal:  Negative for abdominal distention, constipation and diarrhea.   Neurological:  Positive for weakness and headaches. Negative for dizziness, syncope and light-headedness.         she has balance issue since CVA.  Physical therapy is helping with this  She has a walker to use     Objective:      Physical Exam  Constitutional:       General: She is not in acute distress.     Appearance: She is not ill-appearing or diaphoretic.   Cardiovascular:      Rate and Rhythm: Normal rate and regular rhythm.      Heart sounds: No murmur heard.    No gallop.   Pulmonary:      Effort: Pulmonary effort is normal. No respiratory distress.      Breath sounds: No wheezing, rhonchi or rales.   Abdominal:       General: There is no distension.   Skin:     General: Skin is warm and dry.      Coloration: Skin is not pale.      Findings: No erythema.   Neurological:      Mental Status: She is alert.      Comments: She has 3+ over 4 left arm left leg strength.       Lab Visit on 07/05/2023   Component Date Value Ref Range Status    WBC 07/05/2023 3.28 (L)  3.90 - 12.70 K/uL Final    RBC 07/05/2023 2.80 (L)  4.00 - 5.40 M/uL Final    Hemoglobin 07/05/2023 10.5 (L)  12.0 - 16.0 g/dL Final    Hematocrit 07/05/2023 30.8 (L)  37.0 - 48.5 % Final    MCV 07/05/2023 110 (H)  82 - 98 fL Final    MCH 07/05/2023 37.5 (H)  27.0 - 31.0 pg Final    MCHC 07/05/2023 34.1  32.0 - 36.0 g/dL Final    RDW 07/05/2023 14.3  11.5 - 14.5 % Final    Platelets 07/05/2023 145 (L)  150 - 450 K/uL Final    MPV 07/05/2023 11.3  9.2 - 12.9 fL Final    Immature Granulocytes 07/05/2023 0.6 (H)  0.0 - 0.5 % Final    Gran # (ANC) 07/05/2023 1.7 (L)  1.8 - 7.7 K/uL Final    Immature Grans (Abs) 07/05/2023 0.02  0.00 - 0.04 K/uL Final    Lymph # 07/05/2023 1.1  1.0 - 4.8 K/uL Final    Mono # 07/05/2023 0.4  0.3 - 1.0 K/uL Final    Eos # 07/05/2023 0.1  0.0 - 0.5 K/uL Final    Baso # 07/05/2023 0.03  0.00 - 0.20 K/uL Final    nRBC 07/05/2023 0  0 /100 WBC Final    Gran % 07/05/2023 52.5  38.0 - 73.0 % Final    Lymph % 07/05/2023 32.0  18.0 - 48.0 % Final    Mono % 07/05/2023 11.3  4.0 - 15.0 % Final    Eosinophil % 07/05/2023 2.7  0.0 - 8.0 % Final    Basophil % 07/05/2023 0.9  0.0 - 1.9 % Final    Platelet Estimate 07/05/2023 Decreased (A)   Final    Aniso 07/05/2023 Moderate   Final    Poly 07/05/2023 Occasional   Final    Differential Method 07/05/2023 Automated   Final    Sodium 07/05/2023 139  136 - 145 mmol/L Final    Potassium 07/05/2023 3.8  3.5 - 5.1 mmol/L Final    Chloride 07/05/2023 103  95 - 110 mmol/L Final    CO2 07/05/2023 25  23 - 29 mmol/L Final    Glucose 07/05/2023 104  70 - 110 mg/dL Final    BUN 07/05/2023 6  6 - 20 mg/dL Final    Creatinine  07/05/2023 1.5 (H)  0.5 - 1.4 mg/dL Final    Calcium 07/05/2023 8.9  8.7 - 10.5 mg/dL Final    Total Protein 07/05/2023 6.0  6.0 - 8.4 g/dL Final    Albumin 07/05/2023 3.3 (L)  3.5 - 5.2 g/dL Final    Total Bilirubin 07/05/2023 0.3  0.1 - 1.0 mg/dL Final    Alkaline Phosphatase 07/05/2023 67  55 - 135 U/L Final    AST 07/05/2023 12  10 - 40 U/L Final    ALT 07/05/2023 12  10 - 44 U/L Final    eGFR 07/05/2023 41 (A)  >60 mL/min/1.73 m^2 Final    Anion Gap 07/05/2023 11  8 - 16 mmol/L Final    Free T4 07/05/2023 1.01  0.71 - 1.51 ng/dL Final    TSH 07/05/2023 9.070 (H)  0.400 - 4.000 uIU/mL Final     Assessment:       1. Cerebrovascular accident (CVA), unspecified mechanism    2. Embolic infarction    3. Tobacco abuse disorder    4. Left-sided weakness    5. Hypotension, unspecified hypotension type        Plan:   Blood pressure noted.  Monitor blood pressure twice a day.  Follow-up in 6 weeks increased walking.  Discontinue cigarettes.  She recently had TSH free T4 which has improved from previous.  Continue Plavix and atorvastatin.      Cerebrovascular accident (CVA), unspecified mechanism    Embolic infarction    Tobacco abuse disorder    Left-sided weakness    Hypotension, unspecified hypotension type

## 2023-07-28 ENCOUNTER — PATIENT MESSAGE (OUTPATIENT)
Dept: PHARMACY | Facility: CLINIC | Age: 53
End: 2023-07-28
Payer: MEDICAID

## 2023-07-29 NOTE — PROGRESS NOTES
From: Jeannine Márquez  To: VIRIDIANA Womack  Sent: 7/29/2023 10:38 AM CDT  Subject: Forms    This message is being sent by Thanh Schmidt on behalf of Jeannine Márquez. Hi, I believe we left behind Rachel's forms at the nurse's station. I had called and asked to have the physical form mailed home so I could give it to her school. I haven't received anything yet. Can you confirm that the sports physical was mailed to: [address], [place] [id number], please? You can also fax it to: Matt Coley @ [employer]: [phone].   Thank you,  Cass Obando Progress Note  Palliative Care      Consult Requested By: Dr. Asencio, oncology  Reason for Consult: pain and symptom management,   Understanding Illness, Treatment Decisions, Coping with Life-Limiting Illness and Advance Care Planning      ASSESSMENT/PLAN:     Plan/Recommendations:  Diagnoses and all orders for this visit:    Encounter for palliative care   -patient is decisional   -patient is accompanied by her partner of 16 years, Alessio Deluna   -ACP documents reviewed   -asked them to bring copy of HCPOA to next visit   -HCPOA:  David Acevedo, sister, at [phone]   -philosophy of palliative medicine reviewed with patient and family   -pt is DNR; LaPOST completed today     Neoplasm related pain   -controlled on current regimen   -she is taking her Norco 10/325mg every 4-5 hours, typically 5-6 tablets per day (needs #180 tablets)   -continue with MS Contin 15mg PO BID, discussed up titration to cut back on Norco   -new knee pain-refer to orthopedics    Metastatic squamous cell carcinoma of cervix   -followed by Dr. Asencio   -followed by gyn onc, Dr. Desouza (last visit 11/17/21)   -previously on palliative chemotherapy: cisplatin, paclitaxel and bevacizumab   -now on maintenance bevacizumab (Zometa on hold)    Metastatic breast cancer with bone mets   -followed by Dr. Asencio   -continues on Femara, recently restarted on palbociclib     Left EAC cholesteatoma   -followed by neurotology (last visit 12/16/21)   -improved after medication powder        Understanding of illness/Prognosis: good insight into multiple malignancies    Goals of care:  Life-prolonging, okay with hospice if treatment options become limited    Follow up: 2-3 months      SUBJECTIVE:     History of Present Illness:  Patient is a 51 y.o. year old female presenting with for palliative follow up for her metastatic breast cancer, SCC of the cervix. Treatments currently on hold due to recurrent ear infection. She is anxious about results of her  recent scan and fearful of disease progression while off treatment.  Otherwise, her pain continues to be controlled on her current regimen.  No new symptoms or complaints today.    ONCOLOGY DIAGNOSES:  Stage IV cervical squamous cell carcinoma   stage IV, T2 N1b M1, invasive breast carcinoma, ER/OR+, HER2-   Papillary thyroid carcinoma status post total thyroidectomy on 08/31/2017, mpT1b N0    Cervical HSIL MIREILLE 3 s/p LEEP, 2017    Interval History:  01/06/2022  Patient looks and feels better compared to last visit.  She walked into her appt.  Ear is better and she is being followed by ENT.  She has resumed treatment for both her cervical and breast cancer and is followed closely by medical oncology and gyn oncology.  Pain continues to be controlled on current medication regimen.  She is having knee issues with swelling and likely OA.  We discussed referral to orthopedics.  She also plans to follow up with neurosurgery.  Overall, no major changes and no new issues.     NARRATIVE:       JEANETTE GANNON reviewed and summarized:           Past Medical History:   Diagnosis Date    Anxiety     Asthma     Breast cancer 2017    Cancer     right breast, metastatic-lymph nodes, bone, and thyroid     COPD (chronic obstructive pulmonary disease)     Depression     History of psychiatric hospitalization     2000; suicidal ideation    Hx of psychiatric care     Hypothyroidism     Miscarriage     five miscarriages    Obesity     Psychiatric problem     Thyroid cancer 2017     Past Surgical History:   Procedure Laterality Date    ABSCESS DRAINAGE      bilateral axilla    ADENOIDECTOMY      APPENDECTOMY      BREAST BIOPSY Right     x3    CHOLECYSTECTOMY      ENDOBRONCHIAL ULTRASOUND Bilateral 2/18/2021    Procedure: ENDOBRONCHIAL ULTRASOUND (EBUS);  Surgeon: Jaron Ni MD;  [facility];  Service: Pulmonary;  Laterality: Bilateral;    FLUOROSCOPY N/A 1/28/2021    Procedure: Mediport placement;  Surgeon:  Noah Phelan MD;  Location: [facility];  Service: General;  Laterality: N/A;    MASS EXCISION      MEDIPORT INSERTION, SINGLE Right 02/2021    SKIN GRAFT  06/16/2017    THYROIDECTOMY Bilateral     TONSILLECTOMY       Family History   Problem Relation Age of Onset    Arthritis Mother     Early death Mother         64 at time of death    Heart attack Mother     Heart disease Mother     Heart failure Mother     Hypertension Mother     Coronary artery disease Father     Hearing loss Father     Heart disease Father     Hyperlipidemia Father     Hypertension Father     Mental illness Father     Learning disabilities Son     Cancer Maternal Aunt      Review of patient's allergies indicates:   Allergen Reactions    Gabapentin Swelling     Note: unilateral joint edema, unclear if due to gabapentin    Nsaids (non-steroidal anti-inflammatory drug) Other (See Comments)     Instructed not to take NSAID drugs with chemo pill.    Clindamycin Rash    Vancomycin analogues Itching       Medications:    Current Outpatient Medications:     albuterol (PROVENTIL) 2.5 mg /3 mL (0.083 %) nebulizer solution, Take 3 mLs (2.5 mg total) by nebulization every 4 to 6 hours as needed for Wheezing or Shortness of Breath., Disp: 360 mL, Rfl: 11    albuterol (PROVENTIL/VENTOLIN HFA) 90 mcg/actuation inhaler, Inhale 2 puffs into the lungs every 4 (four) hours as needed for Wheezing or Shortness of Breath., Disp: 18 g, Rfl: 11    ALPRAZolam (XANAX) 1 MG tablet, Take 0.5-1 tablets (0.5-1 mg total) by mouth 2 (two) times daily as needed for Anxiety., Disp: 60 tablet, Rfl: 0    buPROPion (WELLBUTRIN SR) 150 MG TBSR 12 hr tablet, Take 1 tablet (150 mg total) by mouth 2 (two) times daily. Take 1 tablet (150mg) once daily for 1 week then increase to twice daily, Disp: 60 tablet, Rfl: 0    calcium carbonate 400 mg calcium (1,000 mg) Chew, Take 2,000 mg by mouth once daily., Disp: , Rfl:     cyanocobalamin (VITAMIN B-12)  1000 MCG tablet, Take 1 tablet (1,000 mcg total) by mouth once daily., Disp: 90 tablet, Rfl: 3    diphenoxylate-atropine 2.5-0.025 mg (LOMOTIL) 2.5-0.025 mg per tablet, Take 1 tablet by mouth 4 (four) times daily as needed for Diarrhea., Disp: 30 tablet, Rfl: 1    ergocalciferol (VITAMIN D2) 50,000 unit Cap, Take 5,000 Units by mouth once daily at 6am. , Disp: , Rfl:     HYDROcodone-acetaminophen (NORCO)  mg per tablet, Take 1 tablet by mouth every 4 (four) hours as needed for Pain. No more than 4 doses/ day, Disp: 180 tablet, Rfl: 0    ipratropium (ATROVENT) 42 mcg (0.06 %) nasal spray, 2 sprays by Nasal route 4 (four) times daily. As needed for rhinitis. Take in evening before bed and in the morning, Disp: 15 mL, Rfl: 11    letrozole (FEMARA) 2.5 mg Tab, Take 1 tablet (2.5 mg) by mouth once daily., Disp: 90 tablet, Rfl: 3    levoFLOXacin (LEVAQUIN) 500 MG tablet, Take 1 tablet (500 mg total) by mouth once daily., Disp: 10 tablet, Rfl: 0    levothyroxine (EUTHYROX) 175 MCG tablet, Take 1 tablet (175 mcg total) by mouth once daily., Disp: 30 tablet, Rfl: 2    magnesium oxide (MAGOX) 400 mg (241.3 mg magnesium) tablet, Take 1 tablet (400 mg total) by mouth once daily., Disp: 5 tablet, Rfl: 1    morphine (MS CONTIN) 15 MG 12 hr tablet, Take 1 tablet (15 mg total) by mouth every 12 (twelve) hours., Disp: 60 tablet, Rfl: 0    multivitamin (THERAGRAN) per tablet, Take 1 tablet by mouth once daily., Disp: , Rfl:     OLANZapine (ZYPREXA) 5 MG tablet, Take 1 tablet (5 mg total) by mouth nightly. Take on evenings of chemotherapy days 1, 2, 3 and 4, Disp: 12 tablet, Rfl: 2    palbociclib (IBRANCE) 125 mg Cap, Take one capsule (125 mg) by mouth once daily on days 1-21 of each 28-day cycle., Disp: 21 capsule, Rfl: 11    potassium chloride SA (K-DUR,KLOR-CON) 20 MEQ tablet, Take 1 tablet (20 mEq total) by mouth once daily. Take daily for 3 days., Disp: 3 tablet, Rfl: 1    predniSONE (DELTASONE) 20 MG tablet,  Prednisone 60 mg/ day for 3 days, 40 mg/day for 3 days,20 mg/ day for 3 days, (1/2 tablet )10 mg a day for 3 days., Disp: 20 tablet, Rfl: 0    prochlorperazine (COMPAZINE) 5 MG tablet, Take 1 tablet (5 mg total) by mouth every 6 (six) hours as needed for Nausea., Disp: 30 tablet, Rfl: 1    OBJECTIVE:       ROS:  Review of Systems   Constitutional: Positive for activity change and fatigue.   Eyes: Negative.    Respiratory: Negative for shortness of breath.    Gastrointestinal: Negative for abdominal pain.   Endocrine: Negative.    Genitourinary: Negative.    Musculoskeletal: Positive for arthralgias, back pain, gait problem and joint swelling.   Allergic/Immunologic: Positive for immunocompromised state.   Neurological: Positive for weakness. Negative for dizziness and headaches.   Psychiatric/Behavioral: Negative for confusion. The patient is nervous/anxious.        Review of Symptoms    Symptom Assessment (ESAS 0-10 Scale)  Pain:  8  Dyspnea:  0  Anxiety:  8  Nausea:  0  Depression:  0  Anorexia:  0  Fatigue:  6  Insomnia:  9  Restlessness:  9  Agitation:  7     CAM / Delirium:  Negative  Constipation:  Negative  Diarrhea:  Negative    Bowel Management Plan (BMP):  Yes      Location Choices: back and knees.      ECOG Performance Status stGstrstastdstest:st st1st Living Arrangements:  Lives with friend and Lives in nursing home    Psychosocial/Cultural: Lives with her significant other of 16 years: they have 3 children combined, 2 sons from previous marriage      Advance Care Planning   Advance Directives:   Living Will: Yes        Copy on chart: Yes    LaPOST: Yes    Do Not Resuscitate Status: Yes      Decision Making:  Patient answered questions              Physical Exam:  Vitals:    Physical Exam  Constitutional:       General: She is not in acute distress.     Appearance: Normal appearance. She is ill-appearing (chronically ).      Comments: Seating in wheelchair   HENT:      Head: Normocephalic and atraumatic.      Nose:  No congestion.      Mouth/Throat:      Mouth: Mucous membranes are moist.      Pharynx: Oropharynx is clear.   Eyes:      Extraocular Movements: Extraocular movements intact.      Pupils: Pupils are equal, round, and reactive to light.   Cardiovascular:      Rate and Rhythm: Normal rate.   Pulmonary:      Effort: Pulmonary effort is normal.   Abdominal:      General: There is no distension.      Palpations: Abdomen is soft.   Musculoskeletal:         General: Swelling (mild to left knee) and tenderness present.      Cervical back: Neck supple.   Skin:     General: Skin is warm and dry.   Neurological:      Mental Status: She is alert and oriented to person, place, and time.      Motor: Weakness present.      Gait: Gait abnormal.   Psychiatric:         Mood and Affect: Mood normal.         Behavior: Behavior normal.         Thought Content: Thought content normal.         Judgment: Judgment normal.         Labs:  CBC:   WBC   Date Value Ref Range Status   12/28/2021 4.92 3.90 - 12.70 K/uL Final     Hemoglobin   Date Value Ref Range Status   12/28/2021 12.4 12.0 - 16.0 g/dL Final     Hematocrit   Date Value Ref Range Status   12/28/2021 37.7 37.0 - 48.5 % Final     MCV   Date Value Ref Range Status   12/28/2021 101 (H) 82 - 98 fL Final     Platelets   Date Value Ref Range Status   12/28/2021 222 150 - 450 K/uL Final       LFT:   Lab Results   Component Value Date    AST 14 12/28/2021    ALKPHOS 56 12/28/2021    BILITOT 0.4 12/28/2021       Albumin:   Albumin   Date Value Ref Range Status   12/28/2021 3.6 3.5 - 5.2 g/dL Final     Protein:   Total Protein   Date Value Ref Range Status   12/28/2021 6.8 6.0 - 8.4 g/dL Final       Radiology:PET scan from 10/20/21 reviewed      45 minutes of total time spent on the encounter, which includes face to face time and non-face to face time preparing to see the patient (eg, review of tests), Obtaining and/or reviewing separately obtained history, Documenting clinical information  in the electronic or other health record, Independently interpreting results (not separately reported) and communicating results to the patient/family/caregiver, or Care coordination (not separately reported).    Signature: Telma Kumar NP

## 2023-08-03 ENCOUNTER — SPECIALTY PHARMACY (OUTPATIENT)
Dept: PHARMACY | Facility: CLINIC | Age: 53
End: 2023-08-03
Payer: MEDICAID

## 2023-08-03 ENCOUNTER — PATIENT MESSAGE (OUTPATIENT)
Dept: PHARMACY | Facility: CLINIC | Age: 53
End: 2023-08-03
Payer: MEDICAID

## 2023-08-03 NOTE — TELEPHONE ENCOUNTER
Specialty Pharmacy - Refill Coordination    Specialty Medication Orders Linked to Encounter      Flowsheet Row Most Recent Value   Medication #1 palbociclib (IBRANCE) 125 mg Cap (Order#[id number], Rx#[id number])          Refill Questions - Documented Responses      Flowsheet Row Most Recent Value   Patient Availability and HIPAA Verification    Does patient want to proceed with activity? Unable to Reach          We have had multiple attempts to the patient and have been unsuccessful to reach the patient. We will stop reaching out to the patient but in the event that the patient needs the med and contacts us, we will communicate and begin dispensing for the patient. At your next visit with the patient, please review the importance of being in contact with our specialty pharmacy as a part of our care team.      Kyara Jimenez, PharmD  [facility]  [address] [address]  Phone: [phone]  Fax: [phone]

## 2023-08-07 ENCOUNTER — OFFICE VISIT (OUTPATIENT)
Dept: OPHTHALMOLOGY | Facility: CLINIC | Age: 53
End: 2023-08-07
Payer: MEDICAID

## 2023-08-07 DIAGNOSIS — H26.9 CORTICAL CATARACT: ICD-10-CM

## 2023-08-07 DIAGNOSIS — F41.9 ANXIETY AND DEPRESSION: ICD-10-CM

## 2023-08-07 DIAGNOSIS — H34.8320 BRANCH RETINAL VEIN OCCLUSION OF LEFT EYE WITH MACULAR EDEMA: Primary | ICD-10-CM

## 2023-08-07 DIAGNOSIS — F32.A ANXIETY AND DEPRESSION: ICD-10-CM

## 2023-08-07 PROCEDURE — 3044F PR MOST RECENT HEMOGLOBIN A1C LEVEL <7.0%: ICD-10-PCS | Mod: CPTII,,, | Performed by: OPHTHALMOLOGY

## 2023-08-07 PROCEDURE — 99212 OFFICE O/P EST SF 10 MIN: CPT | Mod: PBBFAC | Performed by: OPHTHALMOLOGY

## 2023-08-07 PROCEDURE — 99999 PR PBB SHADOW E&M-EST. PATIENT-LVL II: ICD-10-PCS | Mod: PBBFAC,,, | Performed by: OPHTHALMOLOGY

## 2023-08-07 PROCEDURE — 99999 PR PBB SHADOW E&M-EST. PATIENT-LVL II: CPT | Mod: PBBFAC,,, | Performed by: OPHTHALMOLOGY

## 2023-08-07 PROCEDURE — 1160F RVW MEDS BY RX/DR IN RCRD: CPT | Mod: CPTII,,, | Performed by: OPHTHALMOLOGY

## 2023-08-07 PROCEDURE — 92134 POSTERIOR SEGMENT OCT RETINA (OCULAR COHERENCE TOMOGRAPHY)-BOTH EYES: ICD-10-PCS | Mod: 26,S$PBB,, | Performed by: OPHTHALMOLOGY

## 2023-08-07 PROCEDURE — 92004 PR EYE EXAM, NEW PATIENT,COMPREHESV: ICD-10-PCS | Mod: S$PBB,,, | Performed by: OPHTHALMOLOGY

## 2023-08-07 PROCEDURE — 3044F HG A1C LEVEL LT 7.0%: CPT | Mod: CPTII,,, | Performed by: OPHTHALMOLOGY

## 2023-08-07 PROCEDURE — 1159F MED LIST DOCD IN RCRD: CPT | Mod: CPTII,,, | Performed by: OPHTHALMOLOGY

## 2023-08-07 PROCEDURE — 1160F PR REVIEW ALL MEDS BY PRESCRIBER/CLIN PHARMACIST DOCUMENTED: ICD-10-PCS | Mod: CPTII,,, | Performed by: OPHTHALMOLOGY

## 2023-08-07 PROCEDURE — 92004 COMPRE OPH EXAM NEW PT 1/>: CPT | Mod: S$PBB,,, | Performed by: OPHTHALMOLOGY

## 2023-08-07 PROCEDURE — 92134 CPTRZ OPH DX IMG PST SGM RTA: CPT | Mod: PBBFAC | Performed by: OPHTHALMOLOGY

## 2023-08-07 PROCEDURE — 1159F PR MEDICATION LIST DOCUMENTED IN MEDICAL RECORD: ICD-10-PCS | Mod: CPTII,,, | Performed by: OPHTHALMOLOGY

## 2023-08-07 NOTE — PROGRESS NOTES
===============================  Date today is 8/7/2023  Josey Flores is a 52 y.o. female  Last visit [facility] access hospital: :7/23/2018   Last visit eye dept. Visit date not found    Uncorrected distance visual acuity was 20/40 in the right eye and 20/50 in the left eye. Corrected distance visual acuity was 20/30-2 in the right eye and 20/30 in the left eye.  Tonometry       Tonometry (Applanation, 2:11 PM)         Right Left    Pressure 17 17                  Not recorded       Not recorded       Not recorded       Chief Complaint   Patient presents with    Follow-up     Retinal Eval        Problem List Items Addressed This Visit          Eye/Vision problems    Branch retinal vein occlusion of left eye with macular edema - Primary    Relevant Orders    Posterior Segment OCT Retina-Both eyes (Completed)     Other Visit Diagnoses       Cortical cataract              Instructed to call 24/7 for any worsening of vision, visual distortion or pain.  Check OU independently daily.    Gave my office and personal cell phone number.  ________________  8/7/2023 today  Josey Flores    Following psc ou    Asymptomatic   Previous bvo os now resolved on OCT  OD retinal ok      Rtc dr paulino  1 year  Instructed to call 24/7 for any worsening of vision or symptoms. Check OU daily.   Gave my office and cell phone number.        =============================

## 2023-08-08 RX ORDER — DULOXETIN HYDROCHLORIDE 30 MG/1
30 CAPSULE, DELAYED RELEASE ORAL DAILY
Qty: 30 CAPSULE | Refills: 0 | Status: SHIPPED | OUTPATIENT
Start: 2023-08-08 | End: 2023-10-31 | Stop reason: SDUPTHER

## 2023-08-10 ENCOUNTER — SPECIALTY PHARMACY (OUTPATIENT)
Dept: PHARMACY | Facility: CLINIC | Age: 53
End: 2023-08-10
Payer: MEDICAID

## 2023-08-10 ENCOUNTER — PATIENT MESSAGE (OUTPATIENT)
Dept: PHARMACY | Facility: CLINIC | Age: 53
End: 2023-08-10
Payer: MEDICAID

## 2023-08-10 DIAGNOSIS — E03.9 HYPOTHYROIDISM, UNSPECIFIED TYPE: ICD-10-CM

## 2023-08-10 DIAGNOSIS — Z17.0 MALIGNANT NEOPLASM OF OVERLAPPING SITES OF RIGHT BREAST IN FEMALE, ESTROGEN RECEPTOR POSITIVE: ICD-10-CM

## 2023-08-10 DIAGNOSIS — C50.811 MALIGNANT NEOPLASM OF OVERLAPPING SITES OF RIGHT BREAST IN FEMALE, ESTROGEN RECEPTOR POSITIVE: ICD-10-CM

## 2023-08-10 NOTE — TELEPHONE ENCOUNTER
Incoming call from patient. Reports she was recently in the hospital for UTI. She is currently still receiving oral antibiotics Bactrim. Reports she started her last cycle 7/10 (would have been due to start 8/7).     Since discharge she has not discussed with provider. Will route to assigned Rp to confirm restart.

## 2023-08-11 RX ORDER — LEVOTHYROXINE SODIUM 75 UG/1
75 TABLET ORAL
Qty: 30 TABLET | Refills: 11 | Status: SHIPPED | OUTPATIENT
Start: 2023-08-11 | End: 2024-02-15

## 2023-08-11 RX ORDER — LETROZOLE 2.5 MG/1
2.5 TABLET, FILM COATED ORAL DAILY
Qty: 30 TABLET | Refills: 2 | Status: SHIPPED | OUTPATIENT
Start: 2023-08-11 | End: 2023-11-06 | Stop reason: SDUPTHER

## 2023-08-11 RX ORDER — LEVOTHYROXINE SODIUM 200 UG/1
200 TABLET ORAL DAILY
Qty: 30 TABLET | Refills: 11 | Status: SHIPPED | OUTPATIENT
Start: 2023-08-11 | End: 2024-02-15

## 2023-08-17 ENCOUNTER — OFFICE VISIT (OUTPATIENT)
Dept: PALLIATIVE MEDICINE | Facility: CLINIC | Age: 53
End: 2023-08-17
Payer: MEDICAID

## 2023-08-17 VITALS
TEMPERATURE: 97 F | HEART RATE: 97 BPM | BODY MASS INDEX: 32.6 KG/M2 | HEIGHT: 67 IN | DIASTOLIC BLOOD PRESSURE: 79 MMHG | OXYGEN SATURATION: 99 % | WEIGHT: 207.69 LBS | SYSTOLIC BLOOD PRESSURE: 114 MMHG

## 2023-08-17 DIAGNOSIS — F32.A ANXIETY AND DEPRESSION: ICD-10-CM

## 2023-08-17 DIAGNOSIS — C53.0 MALIGNANT NEOPLASM OF ENDOCERVIX: Primary | ICD-10-CM

## 2023-08-17 DIAGNOSIS — F41.9 ANXIETY AND DEPRESSION: ICD-10-CM

## 2023-08-17 DIAGNOSIS — C55 MALIGNANT NEOPLASM OF UTERUS, UNSPECIFIED SITE: ICD-10-CM

## 2023-08-17 DIAGNOSIS — G89.3 NEOPLASM RELATED PAIN: ICD-10-CM

## 2023-08-17 DIAGNOSIS — C79.81 METASTATIC MALIGNANT NEOPLASM TO BREAST: ICD-10-CM

## 2023-08-17 DIAGNOSIS — Z51.5 PALLIATIVE CARE ENCOUNTER: ICD-10-CM

## 2023-08-17 PROCEDURE — 99214 OFFICE O/P EST MOD 30 MIN: CPT | Mod: S$PBB,,,

## 2023-08-17 PROCEDURE — 99214 PR OFFICE/OUTPT VISIT, EST, LEVL IV, 30-39 MIN: ICD-10-PCS | Mod: S$PBB,,,

## 2023-08-17 PROCEDURE — 3074F PR MOST RECENT SYSTOLIC BLOOD PRESSURE < 130 MM HG: ICD-10-PCS | Mod: CPTII,,,

## 2023-08-17 PROCEDURE — 99999 PR PBB SHADOW E&M-EST. PATIENT-LVL V: ICD-10-PCS | Mod: PBBFAC,,,

## 2023-08-17 PROCEDURE — 3008F PR BODY MASS INDEX (BMI) DOCUMENTED: ICD-10-PCS | Mod: CPTII,,,

## 2023-08-17 PROCEDURE — 3044F PR MOST RECENT HEMOGLOBIN A1C LEVEL <7.0%: ICD-10-PCS | Mod: CPTII,,,

## 2023-08-17 PROCEDURE — 3074F SYST BP LT 130 MM HG: CPT | Mod: CPTII,,,

## 2023-08-17 PROCEDURE — 99999 PR PBB SHADOW E&M-EST. PATIENT-LVL V: CPT | Mod: PBBFAC,,,

## 2023-08-17 PROCEDURE — 3078F DIAST BP <80 MM HG: CPT | Mod: CPTII,,,

## 2023-08-17 PROCEDURE — 3008F BODY MASS INDEX DOCD: CPT | Mod: CPTII,,,

## 2023-08-17 PROCEDURE — 99215 OFFICE O/P EST HI 40 MIN: CPT | Mod: PBBFAC

## 2023-08-17 PROCEDURE — 3078F PR MOST RECENT DIASTOLIC BLOOD PRESSURE < 80 MM HG: ICD-10-PCS | Mod: CPTII,,,

## 2023-08-17 PROCEDURE — 3044F HG A1C LEVEL LT 7.0%: CPT | Mod: CPTII,,,

## 2023-08-17 NOTE — PROGRESS NOTES
Palliative Medicine         Follow up    SUBJECTIVE:     Josey Flores is a 52 y.o. female with history of metastatic breast cancer, thyroid cancer post thyroidectomy (2017), and cervical cancer (2017). She is on surveillance on Letrozole and Ibrance. PET scan in May 2023 showed no evidence of recurrence or metastatic disease. She had a stroke with left-sided weakness (June 02, 2023). She is followed by Dr. Ba.     Chief complaint: Pain/Mood/UDS screen +ve for marijuana  8/17/2023: Pt attended clinic with her partner, Alessio. She was in no acute distress. Vital signs stable on room air.  Pt reported bilateral rib pain and lower abdominal pain 5/10, which she is managing with Norco 10mg QID PRN. She states she used Morphine ER 15mg once when she could not sleep due to pain. She informed me she smoked marijuana occassionally to help with pain, anxiety, and insomnia. She obtains Marijuana from the street. We discussed referring her to a physician who can prescribe Medical Marijauna, as the ones obtained from the street are often contaminated with opioids and other substances, which can increase her risks for injury. She agreed to medical Marijuana referral through [facility].     She is responding to physiotherapy and rehabilitation well. However, she continues to experience anxiety due to the uncertainty of her granddaughter's whereabouts, who presumably lives in [place] with her mother and stepfather. She stated she is worried that her granddaughter is not being cared for due to her mother's history of substance abuse. She stated taking Xanax and Duloxetine have been helpful, and she is open to mental health counseling to further help with her anxiety/depression.       JEANETTE GANNON Reviewed and Summarized:            Past Visits:     7/12/2023: Pt attended clinic with her partner, Alessio. She was in no acute distress. Vital signs stable on room air. She stated she is tolerating NRT,  post stroke rehabilitation/physiotherapy, and doing well overall considering her recent hospital admission for UTI (No admission record on Epic). She denied fevers/chills, hematuria, and dysuria, since completing her antibiotics course three days ago. She reported diarrhea, which she is currently managing with Lomotil.   She stated her bilateral rib pain is stable on Norco 10mg QID PRN. She is no longer taking MS ER. However, she stated she sometimes wakes up in pain when she does not take Norco at night. She also expressed anxiety regarding her CT scan results. She stated she will like feel better once she discusses her results with her. She reported stable anxiety/depression with Xanax and Duloxetine.       LA  Reviewed and Summarized:        6/13/2023: Pt attended clinic with her partner, Alessio. She was in no acute distress. Vital signs stable on room air.    She reported persistent pain under her ribs bilaterally. She stated her Norco was not reinstated following her discharge from the hospital, and MSER 15mg once a day has not been effective for pain, though it helps with sleep. She stated she is aware that her PET scan showed no evidence of disease progression, but showed infection/inflammation in her lungs. She stated she is pleased it is not cancer, and she anxious to see the results of her upcoming CT Chest. She also reported anxiety, dyspnea, fatigue, insomnia, and restlessness which she attributes to persistent pain. She also reported running out of Xanax. She stated she is still depressed, but her mood has improved since starting Duloxetine. Her partner expressed feeling overwhelmed with appointments and driving to [place] for appointments with Neurology. He sought assistance with getting a neurology referral with [facility], but was told there was no available appointments for new pts. I informed pt and her partner that I am unsure of the process of scheduling Neurology referral at  [facility], but I can ask my nursing staff to assist.     We discussed continuing opioid weaning, as her rib pain has not been shown to be cancer-related. Pt agreed to trial of stopping MSER 15mg, and continuing with Norco 10mg QID PRN for pain management. We also discussed chronic pain referral once her pain is stable. Pt and her partner agreed.     LA  Reviewed and Summarized:          4/18/2023: Pt attended clinic with her partner, Alessio. She reported new bilateral under the rib and lower back pain that has started and worsened over the last two months. She stated she discussed this with Dr. Ba during her appointment on April 13, and a PET scan was ordered to assess for recurrent or metastatic disease. She denied, fever, chills, sweats, or dyspnea. She stated she has been taking Norco 10 four times a day, and MS ER 15mg Daily at night. She states she does not take MS ER in the morning due to fear of sleeping during the day. Pt also reported diarrhea, which has improved with Lomotil, in addition to increased anxiety and depression due to worsening pain.   I encouraged her to take Norco 10mg QID PRN for pain, and MS ER 15mg BID if her pain continues to worsen, and she is fine with mild sedation. Pt stated she is only comfortable with taking MSER at night. I noted she last filled her Norco   Pt stated she is willing to try an antidepressant for her mood, and she had stopped taking Wellbutrin about a month ago due to palpitations. I informed her that we will complete an EKG to assess her Qtc interval prior to starting an antidepressant. Her last EKG was in May 2021 showing Qtc of 435. Pt agreed to complete EKG.     2/14/2023: Patient comes to clinic with her significant other Alessio. We discussed her recent visits with Dr. Asencio and Deo. She is in good spirits as her Restaging PET scan 1/11/2023 without evidence of recurrent metastatic disease active and no new areas concerning for active disease. She noted  she was instructed to continue current treatment for breast cancer and surveillance for cervical cancer. She denies any issues and notes that she continues to be active and her and significant other are going out to eat after visit. She notes that she has intermittent pelvic pain in which she attributes to radiation but notes that current pain medication effective in managing. She notes that it does not happen often. In the setting of stable scans and continued improvement clinically, we dicussed continuing opoid wean. Patient currently taking MSER 15mg po BID and taking 3-4 doses of Norco daily. Will decrease MSER to 15mg po Daily and continue current hydrocodone regimen not to exceed 4 doses in a day. Will follow up in 4 weeks to continue opoid wean. No new scripts needed as MSER and hydrocodone were just filled on 2/8/23.     12/1/22: Patient comes to clinic with her significant other Alessio. She is still feeling sad because of her grand daughter since she moved out. She noted that she stopped taking her Wellbutrin but wants to start back taking it to help with her mood also to help her with the process of quitting smoking. Also she notes she has been dealing with worsening pain to her left ear. She saw Otolaryngologist recently and he noted no signs of changes on CT and offered patient surgery or non surgical management. Patient notes that she did not want surgery at that time because her doctor made her aware that the procedure may help but there is a possibility that it may not. She notes that the pain and drainage has worsened and she is now thinking about proceeding with surgery even if there is a risk that it will not work. She has been in touch with her Otolaryngologist's office to schedule appointment. She last saw Dr. Asencio 10/13/22 in which there were no changes and she was to continue on current treatment withLetrozole and palbociclib. Her visit with Gyn/onc also noted no new changes and everything was  stable. During exam patient reports pelvic pain but notes that medication has been effective in managing it. Being that there were no changes with all of her MD visits, I spoke with patient about continuing plan from her previous PM visit with weaning MSER. She is in agreement, we will wean down to MSER 15mg po BID. I did make patient aware that medications provided by me were not for her ear pain or or acute issues she may experience but it is indicated for malignant pain. I reinforced education about taking medications as prescribed and for what it is indicated for. I made her aware that we could not send in another early prescription as my counterpart had to do in November. On 11/1/22,  patient sent a message noting she ran out of her medication before scheduled visit. She reported that she was [facility] due to abdominal pain and then when she was discharged she cut her foot very badly and had to receive stiches. Due to these issues, she used her medications more frequently than she was prescribed. She was taking 2 tabs of MSER 30mg BID instead of 1 BID. She was taking hydrocodone 4-5x/ day. She admitted that she was not supposed to do this and will not do this again. She was hoping that provider would write the 15mg ER tabs to hold her over. She explained why she would not write for the 15mg and typically do not write early refills. This is the second time that she has taken ER medications more frequently than prescribed. She was counseled gain on the risks with this behavior and this would be her last warning as well as last early refill. Moving forward if she takes opioids other than rx this is grounds for dismissal from our clinic given issues with impulse control and safety. She expressed understanding and said that her  has also scolded her. She knows this was not a good choice and continues to apologize and promised she will not do it again. My partner explained our  boundaries in clinic are not punitive but rather in place for her safety. I was present with my counterpart for the conversation and agreed that early refills will be not be permitted moving forward    10/06/2022  She comes to clinic with DEYSI Mccallum today. She is down because her granddaughter is not in a good situation but otherwise feeling okay. Since we last saw each other her pain has improved which is likely related to the positive response to treatment. She would like to begin weaning the MSER back to 30mg BID and hopefully continue to decrease further. She is still taking hydrocodone 10mg QID with good response. Her mood improved with the resumption of Wellbutrin and is still cutting back on her tobacco use. She has been having ear pain which is being evaluated by ENT and is waiting to hear when her CT is scheduled. There seems to have been a charting error with her Letrozole resulting in d/c the order and unable to refill. I will reach out to oncology to reorder. We will follow up in 2 months and she will message me for refills whether she would like to continue weaning before then. We do not have HCPOA on file even though she remembers bringing to clinic. She will bring again next visit and confirms that her sister David is HCPOA.    09/01/22: Ms. Flores comes to clinic today with her fiance Alessio. She reports that her lower abdominal pain has worsened since brachytherapy started several weeks ago. She reports spontaneous lower abdominal pain not related to food but sometimes exacerbated by BM. She denies constipation. She has taken an extra dose of MSER at bedtime to help with sleep. We talked about the risks associated with this and would advise against doing this again. She is taking hydrocodone q4hr sometimes waking up at night and needing another dose averaging 4-6 in a day. I recommend we increase the long acting dose in hopes for better control and less PRN usage. She says her sleep is poor because this  "is when the pain intensifies (lying flat) but also anxiety and sadness since her granddaughter moved out of the home. She is taking Xanax HS which helps. She was taking Wellbutrin in the last but not sure why she stopped. For now lets not resume this and evaluate at her next visit.    06/16/2022: Patient doing okay.  No new issues since her last visit.  She continue to have some lower GI pains describes now as "cramping after a BM".  Previously, her discomfort was after eating.  She has been referred to GI.  She has some fatigue but managing.  Pain is controlled on current regimen.  She has upcoming scans and appointment with Dr. Asencio.  She has some anxiety and life stressors affecting her situation.  Her son and his ex-girfriend (now in [place]) can no longer care for their 4 year old daughter.  She and her partner are assuming the parental rights of her granddaughter.  They seem to be coping as well as can be expected.  Overall, she is doing okay.  We will continue to follow at quarterly intervals.  She knows to reach out if her situation changes or symptom burden worsens.    03/10/2022: Overall, patient seems to be doing okay.  She has some new GI issues with abdominal pain after eating and intermittent diarrhea not felt to be related to her disease or treatment.  Oncology discussed GI referral and she is considering this if no improvement.  We are adjusting her pain medication regimen today mostly to cut back on her prn Norco which she continues to use consistently at 5-6 tablets/day.  She knows she can reach out for additional questions or concerns.     01/06/2022: Patient is a 51 y.o. year old female presenting with for palliative follow up for her metastatic breast cancer, SCC of the cervix. Treatments currently on hold due to recurrent ear infection. She is anxious about results of her recent scan and fearful of disease progression while off treatment.  Otherwise, her pain continues to be controlled on her " current regimen.  No new symptoms or complaints today.    ONCOLOGY DIAGNOSES:  Stage IV cervical squamous cell carcinoma   stage IV, T2 N1b M1, invasive breast carcinoma, ER/ND+, HER2-   Papillary thyroid carcinoma status post total thyroidectomy on 08/31/2017, mpT1b N0    Cervical HSIL MIREILLE 3 s/p LEEP, 2017    Patient looks and feels better compared to last visit.  She walked into her appt.  Ear is better and she is being followed by ENT.  She has resumed treatment for both her cervical and breast cancer and is followed closely by medical oncology and gyn oncology.  Pain continues to be controlled on current medication regimen.  She is having knee issues with swelling and likely OA.  We discussed referral to orthopedics.  She also plans to follow up with neurosurgery.  Overall, no major changes and no new issues.     LA TERESSA reviewed and summarized:      OBJECTIVE:     Review of Systems   Constitutional:  Negative for malaise/fatigue and weight loss.   HENT:  Negative for sore throat.    Respiratory:  Negative for cough, shortness of breath and wheezing.    Cardiovascular:  Negative for chest pain and palpitations.   Gastrointestinal:  Positive for nausea. Negative for abdominal pain, constipation and diarrhea.   Musculoskeletal:         Bilateral rib pain   Neurological:  Positive for weakness (S/P stroke. Left-sided weakness). Negative for dizziness, tingling, sensory change, speech change, seizures, loss of consciousness and headaches.   Psychiatric/Behavioral:  Positive for depression. Negative for suicidal ideas. The patient is nervous/anxious and has insomnia.         Review of Symptoms      Symptom Assessment (ESAS 0-10 Scale)  Pain:  5  Dyspnea:  0  Anxiety:  7  Nausea:  5  Depression:  8  Anorexia:  0  Fatigue:  8  Insomnia:  9  Restlessness:  7  Agitation:  7     CAM / Delirium:  Negative  Constipation:  Negative  Diarrhea:  Negative    Anxiety:  Is nervous/anxious  Constipation:  No constipation    Bowel  Management Plan (BMP):  Yes      Pain Assessment    Location(s): Abdomen and bilateral ribs    Abdomen       Location: lower        Quality: Aching        Quantity: 5/10 in intensity        Chronicity: Onset 1 year (s) ago, stable       Aggravating Factors: Activity        Alleviating Factors: Opiates       Associated Symptoms: Myalgias  Ribs       Location: bilateral        Quality: Sharp        Quantity: 5/10 in intensity        Chronicity: Onset 3 month(s) ago, stable with opioids       Alleviating Factors: Opiates        Associated Symptoms: Myalgias    Modified Osmar Scale:  0    ECOG Performance Status rdGrdrrdarddrderd:rd rd3rd Living Arrangements:  Lives with spouse    Psychosocial/Cultural:   See Palliative Psychosocial Note: No  Lives with her significant other of 16 years: they have 3 children combined, 2 sons from previous marriage. Granddaughter recently moved out of the home  **Primary  to Follow**  Palliative Care  Consult: No    Advance Care Planning   Advance Directives:   Living Will: Yes        Copy on chart: Yes    [place]: Yes    Do Not Resuscitate Status: Yes    Agent's Name:  David Acevedo, sister, [phone]    Decision Making:  Patient answered questions  Goals of Care: What is most important right now is to focus on remaining as independent as possible, symptom/pain control, curative/life-prolongation (regardless of treatment burdens). Accordingly, we have decided that the best plan to meet the patient's goals includes continuing with treatment.      Physical Exam:  Vitals:    Physical Exam  Vitals and nursing note reviewed.   Constitutional:       General: She is not in acute distress.     Appearance: Normal appearance. She is normal weight. She is not ill-appearing.   HENT:      Head: Normocephalic and atraumatic.      Comments: Left facial droop     Nose: Nose normal. No congestion or rhinorrhea.      Mouth/Throat:      Mouth: Mucous membranes are moist.      Pharynx:  Oropharynx is clear. No oropharyngeal exudate or posterior oropharyngeal erythema.   Eyes:      General: No scleral icterus.        Right eye: No discharge.         Left eye: No discharge.      Conjunctiva/sclera: Conjunctivae normal.      Pupils: Pupils are equal, round, and reactive to light.   Cardiovascular:      Rate and Rhythm: Normal rate and regular rhythm.      Pulses: Normal pulses.      Heart sounds: Normal heart sounds. No murmur heard.     No gallop.   Pulmonary:      Effort: Pulmonary effort is normal. No respiratory distress.      Breath sounds: Normal breath sounds. No stridor. No wheezing.   Chest:      Chest wall: No tenderness.   Abdominal:      General: Bowel sounds are normal. There is no distension.      Palpations: Abdomen is soft.      Tenderness: There is no abdominal tenderness.   Musculoskeletal:         General: Tenderness (lateral ribs L>R) present. No swelling. Normal range of motion.      Cervical back: Normal range of motion and neck supple.      Right lower leg: No edema.      Left lower leg: No edema.      Comments: S/P stroke. Left arm and left leg weakness   Skin:     General: Skin is warm and dry.      Capillary Refill: Capillary refill takes less than 2 seconds.      Coloration: Skin is not jaundiced or pale.      Findings: No rash.   Neurological:      Mental Status: She is alert and oriented to person, place, and time.      GCS: GCS eye subscore is 4. GCS verbal subscore is 5. GCS motor subscore is 6.      Motor: Weakness (s/p stroke June 02/ Left sided weakness) present.      Gait: Gait abnormal (Limping).   Psychiatric:         Attention and Perception: Attention normal.         Mood and Affect: Mood is anxious.         Speech: Speech normal.         Behavior: Behavior normal.         Thought Content: Thought content normal.         Cognition and Memory: Cognition normal.         Judgment: Judgment normal.          Radiology and laboratory data reviewed    ASSESSMENT/PLAN:      Malignant neoplasm of overlapping sites of right breast in female, estrogen receptor positive  -Followed by Dr. Asencio  -On Surveillance- Continues Letrozole and Ibrance  -PET scan 5/03: There is a focal mildly hypermetabolic ground-glass opacity in the left lower lobe which is new from the prior examination. This is more likely infectious/inflammatory in nature. Short-term CT chest follow-up is advised.  -CT chest 07/07/23: 1. Interval resolution of the left lower lobe ground-glass opacity seen on the prior PET-CT. No evidence for thoracic metastatic disease. Severe centrilobular emphysema.  -Repeat PET scan Oct. 2023    Malignant neoplasm of endocervix  -Followed by  noted JUAN FRANCISCO  -Restaging scan 5/03/2023 without evidence of recurrent metastatic disease active  -Holding further maintenance immunotherapy at this time given no evidence of recurrent / progressive metastatic disease    -Surveillance for cervical cancer.    Neoplasm-related pain   -Stable Rib pain-Unclear etiology, as scans have been stable.  -Stop MSER as it is not effective, and trial opioid weaning in the setting of stable scans.  - Pt prefers to stop long-acting than decrease Norco frequency.  -Continue Norco 10mg QID PRN not to exceed 4 doses in a day  -Pt open to chronic pain management referral given no evidence of cancer recurrence or metastasis  -Will continue weaning, as chronic pain referral may take at two months.   -Refilled Zofran 4mg BID PRN for nausea  UDS-7/12/2023: Showed Oxycodone, marijuana, and Morphine.  -Referral to [facility] for Medical Marijuana RX  Narcan rx- Pt stated she knows how to use it     Anxiety/Depression  -Stable  -Continue Xanax 0.5mg-1mg BID PRN  -Continue Duloxetine 30mg Daily. Will maintain current dose due to renal function.  -Referral to [facility] for Medical Marijuana RX  -Referral to Hem/Onc Psych    Palliative Care Encounter  -HCPOA:  David Acevedo, sister, [phone]  -Code Status: DNR; Tay  completed and uploaded in EMR  -[facility]-Continue cancer treatment with surveillance, symptom management, improve functional status with rehabilitation.      Follow up: 8 weeks for pain/anxiety assessment    30 minutes of total time spent on the encounter, which includes face to face time and non-face to face time preparing to see the patient (eg, review of tests), Obtaining and/or reviewing separately obtained history, Documenting clinical information in the electronic or other health record, Independently interpreting results (not separately reported) and communicating results to the patient/family/caregiver, or Care coordination (not separately reported).    Signature: SUZIE GOTTI NP

## 2023-08-17 NOTE — PATIENT INSTRUCTIONS
I am unable to prescribe medical marijuana. Here is a website, [employer] to obtain medical marijuana    [employer] medical Marijuana  - [email or web address]    Coupon for medical marijuana- [email or web address]  Please tell your neurologist you are looking into obtaining medical Marijuana    I will also refer you to psychology team for more help with your mood.

## 2023-08-23 ENCOUNTER — OFFICE VISIT (OUTPATIENT)
Dept: HEMATOLOGY/ONCOLOGY | Facility: CLINIC | Age: 53
End: 2023-08-23
Payer: MEDICAID

## 2023-08-23 ENCOUNTER — INFUSION (OUTPATIENT)
Dept: INFUSION THERAPY | Facility: HOSPITAL | Age: 53
End: 2023-08-23
Attending: INTERNAL MEDICINE
Payer: MEDICAID

## 2023-08-23 VITALS
HEART RATE: 100 BPM | BODY MASS INDEX: 33.65 KG/M2 | OXYGEN SATURATION: 98 % | TEMPERATURE: 97 F | WEIGHT: 214.38 LBS | HEIGHT: 67 IN | SYSTOLIC BLOOD PRESSURE: 109 MMHG | DIASTOLIC BLOOD PRESSURE: 69 MMHG

## 2023-08-23 DIAGNOSIS — C50.811 MALIGNANT NEOPLASM OF OVERLAPPING SITES OF RIGHT BREAST IN FEMALE, ESTROGEN RECEPTOR POSITIVE: ICD-10-CM

## 2023-08-23 DIAGNOSIS — C53.9 RECURRENT CERVICAL CANCER: ICD-10-CM

## 2023-08-23 DIAGNOSIS — E03.9 HYPOTHYROIDISM, UNSPECIFIED TYPE: ICD-10-CM

## 2023-08-23 DIAGNOSIS — Z17.0 MALIGNANT NEOPLASM OF OVERLAPPING SITES OF RIGHT BREAST IN FEMALE, ESTROGEN RECEPTOR POSITIVE: ICD-10-CM

## 2023-08-23 DIAGNOSIS — C77.3 MALIGNANT NEOPLASM METASTATIC TO LYMPH NODE OF AXILLA: Primary | ICD-10-CM

## 2023-08-23 DIAGNOSIS — C50.919 PRIMARY MALIGNANT NEOPLASM OF BREAST WITH METASTASIS TO OTHER SITE, UNSPECIFIED LATERALITY: Primary | ICD-10-CM

## 2023-08-23 DIAGNOSIS — C79.51 SECONDARY CANCER OF BONE: ICD-10-CM

## 2023-08-23 PROCEDURE — 3074F SYST BP LT 130 MM HG: CPT | Mod: CPTII,,, | Performed by: INTERNAL MEDICINE

## 2023-08-23 PROCEDURE — 3044F PR MOST RECENT HEMOGLOBIN A1C LEVEL <7.0%: ICD-10-PCS | Mod: CPTII,,, | Performed by: INTERNAL MEDICINE

## 2023-08-23 PROCEDURE — 1160F RVW MEDS BY RX/DR IN RCRD: CPT | Mod: CPTII,,, | Performed by: INTERNAL MEDICINE

## 2023-08-23 PROCEDURE — 99215 PR OFFICE/OUTPT VISIT, EST, LEVL V, 40-54 MIN: ICD-10-PCS | Mod: S$PBB,,, | Performed by: INTERNAL MEDICINE

## 2023-08-23 PROCEDURE — 99999 PR PBB SHADOW E&M-EST. PATIENT-LVL V: CPT | Mod: PBBFAC,,, | Performed by: INTERNAL MEDICINE

## 2023-08-23 PROCEDURE — 99215 OFFICE O/P EST HI 40 MIN: CPT | Mod: S$PBB,,, | Performed by: INTERNAL MEDICINE

## 2023-08-23 PROCEDURE — 1159F PR MEDICATION LIST DOCUMENTED IN MEDICAL RECORD: ICD-10-PCS | Mod: CPTII,,, | Performed by: INTERNAL MEDICINE

## 2023-08-23 PROCEDURE — 3074F PR MOST RECENT SYSTOLIC BLOOD PRESSURE < 130 MM HG: ICD-10-PCS | Mod: CPTII,,, | Performed by: INTERNAL MEDICINE

## 2023-08-23 PROCEDURE — 3008F PR BODY MASS INDEX (BMI) DOCUMENTED: ICD-10-PCS | Mod: CPTII,,, | Performed by: INTERNAL MEDICINE

## 2023-08-23 PROCEDURE — 99215 OFFICE O/P EST HI 40 MIN: CPT | Mod: PBBFAC | Performed by: INTERNAL MEDICINE

## 2023-08-23 PROCEDURE — 1160F PR REVIEW ALL MEDS BY PRESCRIBER/CLIN PHARMACIST DOCUMENTED: ICD-10-PCS | Mod: CPTII,,, | Performed by: INTERNAL MEDICINE

## 2023-08-23 PROCEDURE — 3044F HG A1C LEVEL LT 7.0%: CPT | Mod: CPTII,,, | Performed by: INTERNAL MEDICINE

## 2023-08-23 PROCEDURE — 3078F PR MOST RECENT DIASTOLIC BLOOD PRESSURE < 80 MM HG: ICD-10-PCS | Mod: CPTII,,, | Performed by: INTERNAL MEDICINE

## 2023-08-23 PROCEDURE — 3008F BODY MASS INDEX DOCD: CPT | Mod: CPTII,,, | Performed by: INTERNAL MEDICINE

## 2023-08-23 PROCEDURE — A4216 STERILE WATER/SALINE, 10 ML: HCPCS | Performed by: INTERNAL MEDICINE

## 2023-08-23 PROCEDURE — 36591 DRAW BLOOD OFF VENOUS DEVICE: CPT

## 2023-08-23 PROCEDURE — 25000003 PHARM REV CODE 250: Performed by: INTERNAL MEDICINE

## 2023-08-23 PROCEDURE — 99999 PR PBB SHADOW E&M-EST. PATIENT-LVL V: ICD-10-PCS | Mod: PBBFAC,,, | Performed by: INTERNAL MEDICINE

## 2023-08-23 PROCEDURE — 96523 IRRIG DRUG DELIVERY DEVICE: CPT

## 2023-08-23 PROCEDURE — 63600175 PHARM REV CODE 636 W HCPCS: Performed by: INTERNAL MEDICINE

## 2023-08-23 PROCEDURE — 1159F MED LIST DOCD IN RCRD: CPT | Mod: CPTII,,, | Performed by: INTERNAL MEDICINE

## 2023-08-23 PROCEDURE — 3078F DIAST BP <80 MM HG: CPT | Mod: CPTII,,, | Performed by: INTERNAL MEDICINE

## 2023-08-23 RX ORDER — SODIUM CHLORIDE 0.9 % (FLUSH) 0.9 %
10 SYRINGE (ML) INJECTION
Status: CANCELLED | OUTPATIENT
Start: 2023-08-23

## 2023-08-23 RX ORDER — SODIUM CHLORIDE 0.9 % (FLUSH) 0.9 %
10 SYRINGE (ML) INJECTION
Status: DISCONTINUED | OUTPATIENT
Start: 2023-08-23 | End: 2023-08-23 | Stop reason: HOSPADM

## 2023-08-23 RX ORDER — HEPARIN 100 UNIT/ML
500 SYRINGE INTRAVENOUS
Status: CANCELLED | OUTPATIENT
Start: 2023-08-23

## 2023-08-23 RX ORDER — HEPARIN 100 UNIT/ML
500 SYRINGE INTRAVENOUS
Status: DISCONTINUED | OUTPATIENT
Start: 2023-08-23 | End: 2023-08-23 | Stop reason: HOSPADM

## 2023-08-23 RX ADMIN — Medication 10 ML: at 10:08

## 2023-08-23 RX ADMIN — HEPARIN 500 UNITS: 100 SYRINGE at 10:08

## 2023-08-23 NOTE — PROGRESS NOTES
Subjective:      DATE OF VISIT: 8/23/2023   ?   Patient ID:?Josey Flores is a 52 y.o. female.?? MR#: [id number]   ?   PRIMARY PROVIDER: Dr. Asencio  ?   CHIEF COMPLAINT:  Follow-up  ?   ONCOLOGIC DIAGNOSIS:      Stage IV cervical squamous cell carcinoma    stage IV, T2 N1b M1, invasive breast carcinoma, ER/WY+, HER2-    Papillary thyroid carcinoma status post total thyroidectomy on 08/31/2017, mpT1b N0     Cervical HSIL MIREILLE 3 s/p LEEP, 2017  ?   CURRENT TREATMENT:    Letrozole and palbociclib    PAST TREATMENT:    Palliative chemotherapy: cisplatin, paclitaxel and bevacizumab; 02/22/2021 cycle 1 day 1 -> bevacizumab maintenance after 6 cycles  Carboplatin, paclitaxel and pembrolizumab, C1D1 1/28/22; maintenance pembrolizumab, d/c 9/2022    INTERVAL EVENTS  Since stroke 2023 she is had mild sensation change right hand and left facial droop stable but has had improvement in strength.  Recent [facility] admission for infection boil groin recently completed antibiotics with resolution.  She has held oral chemotherapy palbociclib during this time.      ROS  A comprehensive 14-point review of systems was reviewed with patient and was negative other than as specified above.   ?     Objective:      Physical Exam        Vitals:    08/23/23 0913   BP: 109/69   Pulse: 100   Temp: 97.4 °F (36.3 °C)        ECOG:?0   General appearance: Generally well appearing, in no acute distress.   Head, eyes, ears, nose, and throat: moist mucous membranes.   Respiratory:  Normal work of breathing  Abdomen: nontender, nondistended.   Extremities: Warm, without edema.   Neurologic: Alert and oriented. Grossly normal strength, coordination, and gait. Mild left facial droop.  Skin: No rashes, ecchymoses or petechial lesion.   Psychiatric:  Normal mood and affect.          Laboratory:  ?   No visits with results within 1 Day(s) from this visit.   Latest known visit with results is:   Lab Visit on 07/05/2023   Component Date Value Ref  Range Status    WBC 07/05/2023 3.28 (L)  3.90 - 12.70 K/uL Final    RBC 07/05/2023 2.80 (L)  4.00 - 5.40 M/uL Final    Hemoglobin 07/05/2023 10.5 (L)  12.0 - 16.0 g/dL Final    Hematocrit 07/05/2023 30.8 (L)  37.0 - 48.5 % Final    MCV 07/05/2023 110 (H)  82 - 98 fL Final    MCH 07/05/2023 37.5 (H)  27.0 - 31.0 pg Final    MCHC 07/05/2023 34.1  32.0 - 36.0 g/dL Final    RDW 07/05/2023 14.3  11.5 - 14.5 % Final    Platelets 07/05/2023 145 (L)  150 - 450 K/uL Final    MPV 07/05/2023 11.3  9.2 - 12.9 fL Final    Immature Granulocytes 07/05/2023 0.6 (H)  0.0 - 0.5 % Final    Gran # (ANC) 07/05/2023 1.7 (L)  1.8 - 7.7 K/uL Final    Immature Grans (Abs) 07/05/2023 0.02  0.00 - 0.04 K/uL Final    Lymph # 07/05/2023 1.1  1.0 - 4.8 K/uL Final    Mono # 07/05/2023 0.4  0.3 - 1.0 K/uL Final    Eos # 07/05/2023 0.1  0.0 - 0.5 K/uL Final    Baso # 07/05/2023 0.03  0.00 - 0.20 K/uL Final    nRBC 07/05/2023 0  0 /100 WBC Final    Gran % 07/05/2023 52.5  38.0 - 73.0 % Final    Lymph % 07/05/2023 32.0  18.0 - 48.0 % Final    Mono % 07/05/2023 11.3  4.0 - 15.0 % Final    Eosinophil % 07/05/2023 2.7  0.0 - 8.0 % Final    Basophil % 07/05/2023 0.9  0.0 - 1.9 % Final    Platelet Estimate 07/05/2023 Decreased (A)   Final    Aniso 07/05/2023 Moderate   Final    Poly 07/05/2023 Occasional   Final    Differential Method 07/05/2023 Automated   Final    Sodium 07/05/2023 139  136 - 145 mmol/L Final    Potassium 07/05/2023 3.8  3.5 - 5.1 mmol/L Final    Chloride 07/05/2023 103  95 - 110 mmol/L Final    CO2 07/05/2023 25  23 - 29 mmol/L Final    Glucose 07/05/2023 104  70 - 110 mg/dL Final    BUN 07/05/2023 6  6 - 20 mg/dL Final    Creatinine 07/05/2023 1.5 (H)  0.5 - 1.4 mg/dL Final    Calcium 07/05/2023 8.9  8.7 - 10.5 mg/dL Final    Total Protein 07/05/2023 6.0  6.0 - 8.4 g/dL Final    Albumin 07/05/2023 3.3 (L)  3.5 - 5.2 g/dL Final    Total Bilirubin 07/05/2023 0.3  0.1 - 1.0 mg/dL Final    Alkaline  Phosphatase 07/05/2023 67  55 - 135 U/L Final    AST 07/05/2023 12  10 - 40 U/L Final    ALT 07/05/2023 12  10 - 44 U/L Final    eGFR 07/05/2023 41 (A)  >60 mL/min/1.73 m^2 Final    Anion Gap 07/05/2023 11  8 - 16 mmol/L Final    Free T4 07/05/2023 1.01  0.71 - 1.51 ng/dL Final    TSH 07/05/2023 9.070 (H)  0.400 - 4.000 uIU/mL Final      Lab Results   Component Value Date    WBC 3.28 (L) 07/05/2023    HGB 10.5 (L) 07/05/2023    HCT 30.8 (L) 07/05/2023     (H) 07/05/2023     (L) 07/05/2023         Chemistry        Component Value Date/Time     07/05/2023 1037    K 3.8 07/05/2023 1037     07/05/2023 1037    CO2 25 07/05/2023 1037    BUN 6 07/05/2023 1037    CREATININE 1.5 (H) 07/05/2023 1037     07/05/2023 1037        Component Value Date/Time    CALCIUM 8.9 07/05/2023 1037    ALKPHOS 67 07/05/2023 1037    AST 12 07/05/2023 1037    ALT 12 07/05/2023 1037    BILITOT 0.3 07/05/2023 1037    ESTGFRAFRICA 60 07/23/2022 1922    EGFRNONAA 52 (A) 07/23/2022 1922          ? IMAGING   No results found. However, due to the size of the patient record, not all encounters were searched. Please check Results Review for a complete set of results.        Results for orders placed or performed during the hospital encounter of 06/02/23 (from the past 2160 hour(s))   MRI BRAIN WITHOUT CONTRAST    Impression    Right frontal and parietal territory acute infarcts as above.  These may be embolic type infarcts.    COMMUNICATION  This critical result was discovered/received at 18:15.  The critical information above was relayed directly by me by telephone to Leila Torres RN on 06/03/2023 at 18:20.      Electronically signed by: Raphael Morris  Date:    06/03/2023  Time:    18:21   MRA Brain    Impression    Exam is severely degraded by motion.  Question abnormality of the right MCA although this is favored to relate to motion.  Given right-sided acute infarcts follow-up CT angiography of the head would  be recommended as soon as clinically feasible.    COMMUNICATION  This critical result was discovered/received at 18:15.  The critical information above was relayed directly by me by telephone to Leila Torres RN on 06/03/2023 at 18:20.      Electronically signed by: Raphael Morris  Date:    06/03/2023  Time:    18:22     *Note: Due to a large number of results and/or encounters for the requested time period, some results have not been displayed. A complete set of results can be found in Results Review.       PATHOLOGY  2/2021  Station 7 lymph node, fine needle aspiration (8 smears, 1 cell block):       -  Positive for malignant cells, morphologically consistent with   metastatic squamous cell carcinoma     Assessment/Plan:       1. Malignant neoplasm metastatic to lymph node of axilla    2. Hypothyroidism, unspecified type    3. Recurrent cervical cancer    4. Malignant neoplasm of overlapping sites of right breast in female, estrogen receptor positive    5. Secondary cancer of bone                Plan:     # metastatic squamous cell carcinoma of the cervix SCC cervix:  history of HSIL Status post LEEP 2017. In December 2020 follow-up with gynecology with new spotty vaginal bleeding/discharge with biopsy 12/7/20 showing squamous cell carcinoma consistent with cervical primary.  MRI pelvis performed showing locally advanced disease with 4.9 cm cervical mass with right parametrial involvement and enlarged right external iliac lymph node 1.5 x 1.2 cm. PET-CT showing avidity of cervical mass with extension to lower uterine segment, right vaginal focus of avidity and lymphadenopathy including right internal external iliac, periaortic, pericaval.  There is the new hypermetabolic lymphadenopathy in right subcarinal 2.3 cm SUV 5.8. Small 9 mm right paratracheal S base slightly increased no FDG avid SUV 2.4.  01/29/2021 multidisciplinary tumor board discussion of her case with review of imaging; concern for new avid  lymphadenopathy particularly in right subcarinal may be most consistent with metastatic cervical squamous cell carcinoma; tumor board recommendation for biopsy to confirm for prognostic information as well as given other concomitant malignancy to exclude alternative histology, although felt less likely metastatic breast given this is been well controlled and primarily bony disease involvement.  We discussed recommendation for systemic chemotherapy given advanced cervical squamous cell carcinoma with cisplatin, paclitaxel and bevacizumab with discontinuation of palbociclib but can continue aromatase inhibitor; taxane may also be active for her concomitant metastatic breast cancer.   - EBUS with biopsy 2/18/21, station 7 consistent with metastatic squamous cell carcinoma of cervical origin.  - STRATA requested on cervical SCC, ERBB3 <5% felt to be subclonal per report, PIK3CA amplification, WD2642, KDM6A, FRANK, TMB low, PDL1 high may indicated sensitivity to check point inhibitor in future regimen.  - audiology exam 2/18/21, recommended that she let us know if any perceived change in her hearing throughout treatment and recommend repeat testing throughout to ensure no ototoxicity.  - [facility] germline genetic testing returned negative for mutation, provided to patient.  - cycle 1 day 1 cisplatin paclitaxel and bevacizumab on 02/22/2021  Repeat scans 04/26/2021 showed excellent improvement in thoracic and pelvic lymphadenopathy and primary cervical mass.  She is asymptomatic from this no further bleeding.  Recommended continuation of her current regimen which she is tolerating well with supportive care, IV fluids 3 times weekly per patient preference.  - restaging after cycle 6 she has continued improvement in metastatic cervical squamous cell carcinoma; avidity in left vulvar area diminished associated with known infection.  She has required substantial supportive care on chemotherapy and recommend given sustained  improvement on scans continuing and absence of chemotherapy with bevacizumab alone and continue close surveillance.  She would like to continue IV fluids weekly as needed.    We reviewed in detail restaging PET-CT January 2022 notable for uptake in cervical region concerning for oligo progression/recurrence of her disease.  Previously biopsy proven metastatic cervical cancer in lungs without evidence of recurrent mass/lymphadenopathy/avidity in this region or otherwise.    Given prior PDL1 high disease CPS >1 we decided to proceed with chemoimmunotherapy per Keynote 826, carboplatin, paclitaxel and pembrolizumab.   She completed 6 cycles of carboplatin paclitaxel with pembrolizumab (1 cycle without pembrolizumab from unclear reasons with my colleague) with interval brachytherapy with Dr. Vázquez [place].    Restaging PET scan with diminished avidity vaginal cuff status post brachytherapy.  No new areas concerning for active disease.  We discussed consideration of holding further maintenance immunotherapy at this time given no evidence of recurrent / progressive metastatic disease and is amenable to this.  Will continue her breast cancer treatment per below.    5/2023 last restaging PET without evidence of recurrent progressive metastatic disease but there were changes in lung for which we obtain short interval follow-up CT chest with resolution left lower lobe abnormalities most likely infectious inflammatory.  Unfortunately she is had interval CVA now on Plavix undergoing physical therapy recommend follow-up with this and Neurology and tobacco cessation.  Restaging scans in October 2023 if otherwise clinically stable from metastatic disease standpoint.  She is continued on letrozole and Ibrance.  Interval skin groin infection status post Bactrim antibiotic course completed.  Will repeat labs if within acceptable limits okay to restart Ibrance.    # Hypothyroidism: levothyroxine 275 mcg  daily.  Repeat thyroid function labs for monitoring.      Macrocytosis: Possibly related to thyroid disorder will continue to monitor.  She is on vitamin-B supplement.  No new anemia.    # neuropathy:  Mild, will monitor with re-initiation of chemotherapy per above.    # metastatic breast cancer ER positive HER2 negative:  Had been on systemic therapy with palbociclib and letrozole.  Tolerating well.  Had been on hold with ENT issues okay to restart at this time.  She understands if concern for active infection to notify us as may need to be held.    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    # history of papillary thyroid carcinoma status post total thyroidectomy on 08/31/2017: s/p total thyroidectomy on levothyroxine.     # tobacco abuse:  Currently working on tobacco cessation after CVA June 2023 on nicotine replacement, congratulated on effort and continued cessation encouraged.    Follow-Up:       Route Chart for Scheduling    Med Onc Chart Routing      Follow up with physician    Follow up with MIGUEL 6 weeks.   Infusion scheduling note    Injection scheduling note Port flush Q 6 weeks   Labs CBC, CMP, free T4 and TSH   Scheduling:  Preferred lab:  Lab interval:     Imaging    Pharmacy appointment    Other referrals        Therapy Plan Information  INF FLUIDS  IV Fluids  sodium chloride 0.9% bolus 1,000 mL  1,000 mL, Intravenous, PRN  Flushes  sodium chloride 0.9% flush 10 mL  10 mL, Intravenous, PRN  heparin, porcine (PF) 100 unit/mL injection flush 500 Units  500 Units, Intravenous, PRN    PORT FLUSH  Flushes  heparin, porcine (PF) 100 unit/mL injection flush 500 Units  500 Units, Intravenous, Every visit  sodium chloride 0.9% flush 10 mL  10 mL, Intravenous, Every visit

## 2023-08-23 NOTE — DISCHARGE INSTRUCTIONS
.[facility]  [address] [address]  [address] [address]  [phone] phone     [phone] fax  Hours of Operation: Monday- Friday 8:00am- 5:00pm  After hours phone  [phone]  Hematology / Oncology Physicians on call    Dr. Jake Cody      Nurse Practitioners:    Argenis Sy, VINNY Dee, VINNY Godoy, VINNY Parson, PA      Please don't hesitate to call if you have any concerns.

## 2023-08-23 NOTE — NURSING
"Pt here for Mediport Flush. Right chestwall mediport accessed with a 20g 1" tomlin via sterile technique.  Excellent blood return noted.  Flushed with 10ml NS and 5 ml heparin solution.  Needle D/C, site without redness, swelling, or drainage noted.  Dressing applied.  Patient tolerated well.  "

## 2023-08-24 DIAGNOSIS — C50.811 MALIGNANT NEOPLASM OF OVERLAPPING SITES OF RIGHT BREAST IN FEMALE, ESTROGEN RECEPTOR POSITIVE: ICD-10-CM

## 2023-08-24 DIAGNOSIS — C77.3 MALIGNANT NEOPLASM METASTATIC TO LYMPH NODE OF AXILLA: ICD-10-CM

## 2023-08-24 DIAGNOSIS — Z17.0 MALIGNANT NEOPLASM OF OVERLAPPING SITES OF RIGHT BREAST IN FEMALE, ESTROGEN RECEPTOR POSITIVE: ICD-10-CM

## 2023-09-04 PROBLEM — G45.9 TIA (TRANSIENT ISCHEMIC ATTACK): Status: RESOLVED | Noted: 2023-06-02 | Resolved: 2023-09-04

## 2023-09-10 DIAGNOSIS — G89.3 NEOPLASM RELATED PAIN: ICD-10-CM

## 2023-09-10 DIAGNOSIS — F41.9 ANXIETY: ICD-10-CM

## 2023-09-10 DIAGNOSIS — C79.81 METASTATIC MALIGNANT NEOPLASM TO BREAST: Primary | ICD-10-CM

## 2023-09-11 RX ORDER — ALPRAZOLAM 1 MG/1
.5-1 TABLET ORAL 2 TIMES DAILY PRN
Qty: 60 TABLET | Refills: 0 | Status: SHIPPED | OUTPATIENT
Start: 2023-09-11 | End: 2023-10-10 | Stop reason: SDUPTHER

## 2023-09-11 RX ORDER — HYDROCODONE BITARTRATE AND ACETAMINOPHEN 10; 325 MG/1; MG/1
1 TABLET ORAL EVERY 6 HOURS PRN
Qty: 120 TABLET | Refills: 0 | Status: SHIPPED | OUTPATIENT
Start: 2023-09-11 | End: 2023-10-10 | Stop reason: SDUPTHER

## 2023-09-25 DIAGNOSIS — C77.3 MALIGNANT NEOPLASM METASTATIC TO LYMPH NODE OF AXILLA: Primary | ICD-10-CM

## 2023-09-29 ENCOUNTER — LAB VISIT (OUTPATIENT)
Dept: LAB | Facility: HOSPITAL | Age: 53
End: 2023-09-29
Attending: PSYCHIATRY & NEUROLOGY
Payer: MEDICAID

## 2023-09-29 DIAGNOSIS — I63.9 CEREBROVASCULAR ACCIDENT (CVA), UNSPECIFIED MECHANISM: ICD-10-CM

## 2023-09-29 DIAGNOSIS — I63.9 RECURRENT CEREBROVASCULAR ACCIDENTS (CVAS): ICD-10-CM

## 2023-09-29 LAB
ALBUMIN SERPL BCP-MCNC: 4.1 G/DL (ref 3.5–5.2)
ALP SERPL-CCNC: 82 U/L (ref 55–135)
ALT SERPL W/O P-5'-P-CCNC: 28 U/L (ref 10–44)
ANION GAP SERPL CALC-SCNC: 15 MMOL/L (ref 8–16)
AST SERPL-CCNC: 26 U/L (ref 10–40)
BASOPHILS # BLD AUTO: 0.07 K/UL (ref 0–0.2)
BASOPHILS NFR BLD: 0.7 % (ref 0–1.9)
BILIRUB SERPL-MCNC: 0.4 MG/DL (ref 0.1–1)
BUN SERPL-MCNC: 13 MG/DL (ref 6–20)
CALCIUM SERPL-MCNC: 9.4 MG/DL (ref 8.7–10.5)
CHLORIDE SERPL-SCNC: 102 MMOL/L (ref 95–110)
CO2 SERPL-SCNC: 23 MMOL/L (ref 23–29)
CREAT SERPL-MCNC: 1.5 MG/DL (ref 0.5–1.4)
DIFFERENTIAL METHOD: ABNORMAL
EOSINOPHIL # BLD AUTO: 0.2 K/UL (ref 0–0.5)
EOSINOPHIL NFR BLD: 2.5 % (ref 0–8)
ERYTHROCYTE [DISTWIDTH] IN BLOOD BY AUTOMATED COUNT: 14.1 % (ref 11.5–14.5)
EST. GFR  (NO RACE VARIABLE): 41 ML/MIN/1.73 M^2
FIBRINOGEN PPP-MCNC: 448 MG/DL (ref 182–400)
GLUCOSE SERPL-MCNC: 132 MG/DL (ref 70–110)
HCT VFR BLD AUTO: 38.5 % (ref 37–48.5)
HGB BLD-MCNC: 13.1 G/DL (ref 12–16)
IMM GRANULOCYTES # BLD AUTO: 0.18 K/UL (ref 0–0.04)
IMM GRANULOCYTES NFR BLD AUTO: 1.9 % (ref 0–0.5)
LYMPHOCYTES # BLD AUTO: 1.9 K/UL (ref 1–4.8)
LYMPHOCYTES NFR BLD: 20 % (ref 18–48)
MAGNESIUM SERPL-MCNC: 2 MG/DL (ref 1.6–2.6)
MCH RBC QN AUTO: 36 PG (ref 27–31)
MCHC RBC AUTO-ENTMCNC: 34 G/DL (ref 32–36)
MCV RBC AUTO: 106 FL (ref 82–98)
MONOCYTES # BLD AUTO: 0.7 K/UL (ref 0.3–1)
MONOCYTES NFR BLD: 6.9 % (ref 4–15)
NEUTROPHILS # BLD AUTO: 6.5 K/UL (ref 1.8–7.7)
NEUTROPHILS NFR BLD: 68 % (ref 38–73)
NRBC BLD-RTO: 0 /100 WBC
PLATELET # BLD AUTO: 267 K/UL (ref 150–450)
PMV BLD AUTO: 10.4 FL (ref 9.2–12.9)
POTASSIUM SERPL-SCNC: 4 MMOL/L (ref 3.5–5.1)
PROT SERPL-MCNC: 7.5 G/DL (ref 6–8.4)
RBC # BLD AUTO: 3.64 M/UL (ref 4–5.4)
RPR SER QL: NORMAL
SODIUM SERPL-SCNC: 140 MMOL/L (ref 136–145)
T4 FREE SERPL-MCNC: 0.58 NG/DL (ref 0.71–1.51)
TSH SERPL DL<=0.005 MIU/L-ACNC: 30.12 UIU/ML (ref 0.4–4)
WBC # BLD AUTO: 9.61 K/UL (ref 3.9–12.7)

## 2023-09-29 PROCEDURE — 80053 COMPREHEN METABOLIC PANEL: CPT | Performed by: PSYCHIATRY & NEUROLOGY

## 2023-09-29 PROCEDURE — 86147 CARDIOLIPIN ANTIBODY EA IG: CPT | Mod: 59 | Performed by: PSYCHIATRY & NEUROLOGY

## 2023-09-29 PROCEDURE — 85025 COMPLETE CBC W/AUTO DIFF WBC: CPT | Performed by: PSYCHIATRY & NEUROLOGY

## 2023-09-29 PROCEDURE — 82746 ASSAY OF FOLIC ACID SERUM: CPT | Performed by: PSYCHIATRY & NEUROLOGY

## 2023-09-29 PROCEDURE — 85384 FIBRINOGEN ACTIVITY: CPT | Performed by: PSYCHIATRY & NEUROLOGY

## 2023-09-29 PROCEDURE — 83090 ASSAY OF HOMOCYSTEINE: CPT | Performed by: PSYCHIATRY & NEUROLOGY

## 2023-09-29 PROCEDURE — 84207 ASSAY OF VITAMIN B-6: CPT | Performed by: PSYCHIATRY & NEUROLOGY

## 2023-09-29 PROCEDURE — 83036 HEMOGLOBIN GLYCOSYLATED A1C: CPT | Performed by: PSYCHIATRY & NEUROLOGY

## 2023-09-29 PROCEDURE — 36415 COLL VENOUS BLD VENIPUNCTURE: CPT | Performed by: PSYCHIATRY & NEUROLOGY

## 2023-09-29 PROCEDURE — 82607 VITAMIN B-12: CPT | Performed by: PSYCHIATRY & NEUROLOGY

## 2023-09-29 PROCEDURE — 80323 ALKALOIDS NOS: CPT | Mod: 91 | Performed by: PSYCHIATRY & NEUROLOGY

## 2023-09-29 PROCEDURE — 84439 ASSAY OF FREE THYROXINE: CPT | Performed by: PSYCHIATRY & NEUROLOGY

## 2023-09-29 PROCEDURE — 83735 ASSAY OF MAGNESIUM: CPT | Performed by: PSYCHIATRY & NEUROLOGY

## 2023-09-29 PROCEDURE — 84443 ASSAY THYROID STIM HORMONE: CPT | Performed by: PSYCHIATRY & NEUROLOGY

## 2023-09-29 PROCEDURE — 80061 LIPID PANEL: CPT | Performed by: PSYCHIATRY & NEUROLOGY

## 2023-09-29 PROCEDURE — 86592 SYPHILIS TEST NON-TREP QUAL: CPT | Performed by: PSYCHIATRY & NEUROLOGY

## 2023-09-29 PROCEDURE — 82306 VITAMIN D 25 HYDROXY: CPT | Performed by: PSYCHIATRY & NEUROLOGY

## 2023-09-29 PROCEDURE — 85300 ANTITHROMBIN III ACTIVITY: CPT | Performed by: PSYCHIATRY & NEUROLOGY

## 2023-09-30 LAB
25(OH)D3+25(OH)D2 SERPL-MCNC: 14 NG/ML (ref 30–96)
CHOLEST SERPL-MCNC: 256 MG/DL (ref 120–199)
CHOLEST/HDLC SERPL: 6.1 {RATIO} (ref 2–5)
ESTIMATED AVG GLUCOSE: 111 MG/DL (ref 68–131)
FOLATE SERPL-MCNC: 3.9 NG/ML (ref 4–24)
HBA1C MFR BLD: 5.5 % (ref 4–5.6)
HCYS SERPL-SCNC: >50 UMOL/L (ref 4–15.5)
HDLC SERPL-MCNC: 42 MG/DL (ref 40–75)
HDLC SERPL: 16.4 % (ref 20–50)
LDLC SERPL CALC-MCNC: 170.8 MG/DL (ref 63–159)
NONHDLC SERPL-MCNC: 214 MG/DL
TRIGL SERPL-MCNC: 216 MG/DL (ref 30–150)
VIT B12 SERPL-MCNC: 169 PG/ML (ref 210–950)

## 2023-10-02 LAB
AT III ACT/NOR PPP CHRO: 95 % (ref 83–118)
CARDIOLIPIN IGG SER IA-ACNC: <9.4 GPL (ref 0–14.99)
CARDIOLIPIN IGM SER IA-ACNC: <9.4 MPL (ref 0–12.49)
COTININE SERPL-MCNC: 144 NG/ML
NICOTINE SERPL-MCNC: 9 NG/ML

## 2023-10-03 LAB — PYRIDOXAL SERPL-MCNC: 6 UG/L (ref 5–50)

## 2023-10-12 DIAGNOSIS — C77.3 MALIGNANT NEOPLASM METASTATIC TO LYMPH NODE OF AXILLA: Primary | ICD-10-CM

## 2023-10-17 ENCOUNTER — TELEPHONE (OUTPATIENT)
Dept: HEMATOLOGY/ONCOLOGY | Facility: CLINIC | Age: 53
End: 2023-10-17
Payer: MEDICAID

## 2023-10-17 NOTE — TELEPHONE ENCOUNTER
----- Message from Luis Joseph MD sent at 10/17/2023  9:25 AM CDT -----  I have not seen the patient since 2018.  Will need to someone see prior to any further evaluation    ----- Message -----  From: Kaela Martinez  Sent: 10/17/2023   9:00 AM CDT  To: Estefani Asencio MD; #    Good Morning!  Will a peer to peer be done for this patient before her appointment on tomorrow or would you like me to reschedule this appointment to give you more time to get it done?  Would you like me to cancel this appointment for now until a later timing?  If you have any questions please contact Radiology at [phone].  Thank You,  Kaela DORAN  Radiology Dept       Spoke with pt regarding above information. Pt has been rescheduled accordingly

## 2023-10-18 ENCOUNTER — TELEPHONE (OUTPATIENT)
Dept: PALLIATIVE MEDICINE | Facility: CLINIC | Age: 53
End: 2023-10-18
Payer: MEDICAID

## 2023-10-18 NOTE — TELEPHONE ENCOUNTER
----- Message from Nichole Sal sent at 10/18/2023  2:51 PM CDT -----  Contact: Josey  Patient is requesting a call; back from the nurse concerning medication concerns, please call patient back at [phone]           \Thanks

## 2023-10-18 NOTE — TELEPHONE ENCOUNTER
----- Message from Bessie Tong sent at 10/18/2023 10:55 AM CDT -----  Contact: Josey  .Type:  Patient Returning Call    Who Called:Josey  Who Left Message for Patient:Antonieta  Does the patient know what this is regarding?:Yes  Would the patient rather a call back or a response via [facility]? Call back  Best Call Back Number:[phone]  Additional Information: na    Thanks

## 2023-10-19 ENCOUNTER — DOCUMENTATION ONLY (OUTPATIENT)
Dept: PALLIATIVE MEDICINE | Facility: CLINIC | Age: 53
End: 2023-10-19
Payer: MEDICAID

## 2023-10-19 DIAGNOSIS — C79.81 METASTATIC MALIGNANT NEOPLASM TO BREAST: ICD-10-CM

## 2023-10-19 DIAGNOSIS — G89.3 NEOPLASM RELATED PAIN: ICD-10-CM

## 2023-10-19 RX ORDER — HYDROCODONE BITARTRATE AND ACETAMINOPHEN 10; 325 MG/1; MG/1
1 TABLET ORAL EVERY 6 HOURS PRN
Qty: 120 TABLET | Refills: 0 | Status: SHIPPED | OUTPATIENT
Start: 2023-10-19 | End: 2023-11-17 | Stop reason: SDUPTHER

## 2023-10-19 NOTE — TELEPHONE ENCOUNTER
----- Message from Maverick Machado sent at 10/19/2023 11:32 AM CDT -----  Contact: Josey Dowling is needing a call back in regards to reordering her medication. She stated that her ins is needing a PA. Please give her a call back at [phone]

## 2023-10-19 NOTE — PROGRESS NOTES
Pt's pharmacy, [facility] needed a PA, pt stated she has been waiting for a while for [facility] to get her prescription filled,  EB request we cancel prescription and send to [facility], the pharmacist will submit the script and work the PA  for  approved, I have already canceled the script at [facility].  Nurse tried to notify ms garcia but,  she didn't answer her phone I will try later.

## 2023-10-22 ENCOUNTER — PATIENT MESSAGE (OUTPATIENT)
Dept: PALLIATIVE MEDICINE | Facility: CLINIC | Age: 53
End: 2023-10-22
Payer: MEDICAID

## 2023-10-23 ENCOUNTER — TELEPHONE (OUTPATIENT)
Dept: HEMATOLOGY/ONCOLOGY | Facility: CLINIC | Age: 53
End: 2023-10-23
Payer: MEDICAID

## 2023-10-23 PROBLEM — I63.9 CEREBROVASCULAR ACCIDENT (CVA): Status: RESOLVED | Noted: 2023-07-24 | Resolved: 2023-10-23

## 2023-10-23 NOTE — TELEPHONE ENCOUNTER
SW consulted to provide transportation to pt's appt on tomorrow due to pt not having a working vehicle at this time. SW spoke with pt and confirmed the above. SW arranged the following Lyft ride:      scheduled on 10/24, 08:00 AM CDT   may arrive between 08:00 AM - 08:15 AM CDT.  [place]  Full name  Josey Flores  Mobile phone number  [phone]  Ride type  Lyft (4 seats)  Route  Pickup  [address] ([address]), [place], [facility]  [facility], [facility]  Addresses may vary from the original request due to minor pickup and drop-off changes. For example, an address may vary if a rider is picked up across the street.    Note to   No note added  Tip added  $0.00  Request details  Coordinator name  Apoorva Sheehan  Date and time  10/23/23, 03:23 PM CDT  Scheduled ride ID  [id number]  Internal note  No note added    Pt will call SW if needed and for return ride home at number provided. No other needs expressed. SW will remain available.

## 2023-10-24 ENCOUNTER — TELEPHONE (OUTPATIENT)
Dept: HEMATOLOGY/ONCOLOGY | Facility: CLINIC | Age: 53
End: 2023-10-24
Payer: MEDICAID

## 2023-10-24 NOTE — TELEPHONE ENCOUNTER
Huma called pt ([phone]) to notify her  was waiting for her outside of her home. She reports having just received a call reporting her appt for today has been canceled. Ryanr canceled pt.'s ride for today. Pt. expressed gratitude. There were no other needs. Ryanr will remain available.

## 2023-10-30 DIAGNOSIS — C77.3 MALIGNANT NEOPLASM METASTATIC TO LYMPH NODE OF AXILLA: Primary | ICD-10-CM

## 2023-10-31 ENCOUNTER — HOSPITAL ENCOUNTER (OUTPATIENT)
Dept: RADIOLOGY | Facility: HOSPITAL | Age: 53
Discharge: HOME | End: 2023-10-31
Attending: INTERNAL MEDICINE
Payer: MEDICAID

## 2023-10-31 ENCOUNTER — OFFICE VISIT (OUTPATIENT)
Dept: PALLIATIVE MEDICINE | Facility: CLINIC | Age: 53
End: 2023-10-31
Payer: MEDICAID

## 2023-10-31 VITALS
TEMPERATURE: 97 F | BODY MASS INDEX: 35.33 KG/M2 | HEIGHT: 67 IN | DIASTOLIC BLOOD PRESSURE: 89 MMHG | OXYGEN SATURATION: 98 % | WEIGHT: 225.06 LBS | HEART RATE: 84 BPM | SYSTOLIC BLOOD PRESSURE: 135 MMHG

## 2023-10-31 DIAGNOSIS — C77.3 MALIGNANT NEOPLASM METASTATIC TO LYMPH NODE OF AXILLA: ICD-10-CM

## 2023-10-31 DIAGNOSIS — F41.9 ANXIETY AND DEPRESSION: ICD-10-CM

## 2023-10-31 DIAGNOSIS — C79.81 METASTATIC MALIGNANT NEOPLASM TO BREAST: Primary | ICD-10-CM

## 2023-10-31 DIAGNOSIS — G89.3 NEOPLASM RELATED PAIN: ICD-10-CM

## 2023-10-31 DIAGNOSIS — C55 MALIGNANT NEOPLASM OF UTERUS, UNSPECIFIED SITE: ICD-10-CM

## 2023-10-31 DIAGNOSIS — Z51.5 PALLIATIVE CARE ENCOUNTER: ICD-10-CM

## 2023-10-31 DIAGNOSIS — F32.A ANXIETY AND DEPRESSION: ICD-10-CM

## 2023-10-31 PROCEDURE — 3075F PR MOST RECENT SYSTOLIC BLOOD PRESS GE 130-139MM HG: ICD-10-PCS | Mod: CPTII,,,

## 2023-10-31 PROCEDURE — 99999 PR PBB SHADOW E&M-EST. PATIENT-LVL V: ICD-10-PCS | Mod: PBBFAC,,,

## 2023-10-31 PROCEDURE — 99215 PR OFFICE/OUTPT VISIT, EST, LEVL V, 40-54 MIN: ICD-10-PCS | Mod: S$PBB,,,

## 2023-10-31 PROCEDURE — 3008F BODY MASS INDEX DOCD: CPT | Mod: CPTII,,,

## 2023-10-31 PROCEDURE — 74176 CT ABD & PELVIS W/O CONTRAST: CPT | Mod: 26,,, | Performed by: RADIOLOGY

## 2023-10-31 PROCEDURE — 99215 OFFICE O/P EST HI 40 MIN: CPT | Mod: PBBFAC,25

## 2023-10-31 PROCEDURE — 3044F HG A1C LEVEL LT 7.0%: CPT | Mod: CPTII,,,

## 2023-10-31 PROCEDURE — 3008F PR BODY MASS INDEX (BMI) DOCUMENTED: ICD-10-PCS | Mod: CPTII,,,

## 2023-10-31 PROCEDURE — 74176 CT ABD & PELVIS W/O CONTRAST: CPT | Mod: TC

## 2023-10-31 PROCEDURE — 71250 CT CHEST ABDOMEN PELVIS WITHOUT CONTRAST(XPD): ICD-10-PCS | Mod: 26,,, | Performed by: RADIOLOGY

## 2023-10-31 PROCEDURE — 3079F DIAST BP 80-89 MM HG: CPT | Mod: CPTII,,,

## 2023-10-31 PROCEDURE — 3075F SYST BP GE 130 - 139MM HG: CPT | Mod: CPTII,,,

## 2023-10-31 PROCEDURE — 99999 PR PBB SHADOW E&M-EST. PATIENT-LVL V: CPT | Mod: PBBFAC,,,

## 2023-10-31 PROCEDURE — 3044F PR MOST RECENT HEMOGLOBIN A1C LEVEL <7.0%: ICD-10-PCS | Mod: CPTII,,,

## 2023-10-31 PROCEDURE — 74176 CT CHEST ABDOMEN PELVIS WITHOUT CONTRAST(XPD): ICD-10-PCS | Mod: 26,,, | Performed by: RADIOLOGY

## 2023-10-31 PROCEDURE — 3079F PR MOST RECENT DIASTOLIC BLOOD PRESSURE 80-89 MM HG: ICD-10-PCS | Mod: CPTII,,,

## 2023-10-31 PROCEDURE — 99215 OFFICE O/P EST HI 40 MIN: CPT | Mod: S$PBB,,,

## 2023-10-31 PROCEDURE — 71250 CT THORAX DX C-: CPT | Mod: 26,,, | Performed by: RADIOLOGY

## 2023-10-31 RX ORDER — DULOXETIN HYDROCHLORIDE 30 MG/1
30 CAPSULE, DELAYED RELEASE ORAL DAILY
Qty: 30 CAPSULE | Refills: 0 | Status: SHIPPED | OUTPATIENT
Start: 2023-10-31 | End: 2023-11-30

## 2023-10-31 NOTE — PROGRESS NOTES
Palliative Medicine         Follow up    SUBJECTIVE:     Josey Flores is a 52 y.o. female with history of metastatic breast cancer, thyroid cancer post thyroidectomy (2017), and cervical cancer (2017). She is on surveillance on Letrozole and Ibrance. PET scan in May 2023 showed no evidence of recurrence or metastatic disease. She had a stroke with left-sided weakness (June 02, 2023). She is followed by Dr. Joseph    Chief complaint: Pain/Mood/Financial Distress  10/31/2023: Pt attended clinic with her partner, Alessio. She was in no acute no acute distress. Vital signs stable on room air. She continues to have  bilateral rib pain and lower abdominal pain 5/10, which she is managing with Norco 10mg QID PRN. Persistent anxiety/depression/insomnia/restlessness due to financial stress with moving to a new house and repairing her car.   She is taking Xanax 1mg BID, but no longer has Duloxetine.   We discussed referring her to chronic pain if her upcoming scan shows no evidence of recurrence.   Pt stated she is unsure why her pain has not improved despite scans showing no evidence of recurrence.     LA  Reviewed and Summarized:        Past Visits:     8/17/2023: Pt attended clinic with her partner, Alessio. She was in no acute distress. Vital signs stable on room air.  Pt reported bilateral rib pain and lower abdominal pain 5/10, which she is managing with Norco 10mg QID PRN. She states she used Morphine ER 15mg once when she could not sleep due to pain. She informed me she smoked marijuana occassionally to help with pain, anxiety, and insomnia. She obtains Marijuana from the street. We discussed referring her to a physician who can prescribe Medical Marijauna, as the ones obtained from the street are often contaminated with opioids and other substances, which can increase her risks for injury. She agreed to medical Marijuana referral through [facility].     She is responding to  physiotherapy and rehabilitation well. However, she continues to experience anxiety due to the uncertainty of her granddaughter's whereabouts, who presumably lives in [place] with her mother and stepfather. She stated she is worried that her granddaughter is not being cared for due to her mother's history of substance abuse. She stated taking Xanax and Duloxetine have been helpful, and she is open to mental health counseling to further help with her anxiety/depression.       LA  Reviewed and Summarized:            Past Visits:     7/12/2023: Pt attended clinic with her partner, Alessio. She was in no acute distress. Vital signs stable on room air. She stated she is tolerating NRT, post stroke rehabilitation/physiotherapy, and doing well overall considering her recent hospital admission for UTI (No admission record on Epic). She denied fevers/chills, hematuria, and dysuria, since completing her antibiotics course three days ago. She reported diarrhea, which she is currently managing with Lomotil.   She stated her bilateral rib pain is stable on Norco 10mg QID PRN. She is no longer taking MS ER. However, she stated she sometimes wakes up in pain when she does not take Norco at night. She also expressed anxiety regarding her CT scan results. She stated she will like feel better once she discusses her results with her. She reported stable anxiety/depression with Xanax and Duloxetine.       LA  Reviewed and Summarized:        6/13/2023: Pt attended clinic with her partner, Alessio. She was in no acute distress. Vital signs stable on room air.    She reported persistent pain under her ribs bilaterally. She stated her Norco was not reinstated following her discharge from the hospital, and MSER 15mg once a day has not been effective for pain, though it helps with sleep. She stated she is aware that her PET scan showed no evidence of disease progression, but showed infection/inflammation in her lungs. She stated she is  pleased it is not cancer, and she anxious to see the results of her upcoming CT Chest. She also reported anxiety, dyspnea, fatigue, insomnia, and restlessness which she attributes to persistent pain. She also reported running out of Xanax. She stated she is still depressed, but her mood has improved since starting Duloxetine. Her partner expressed feeling overwhelmed with appointments and driving to [place] for appointments with Neurology. He sought assistance with getting a neurology referral with [facility], but was told there was no available appointments for new pts. I informed pt and her partner that I am unsure of the process of scheduling Neurology referral at [facility], but I can ask my nursing staff to assist.     We discussed continuing opioid weaning, as her rib pain has not been shown to be cancer-related. Pt agreed to trial of stopping MSER 15mg, and continuing with Norco 10mg QID PRN for pain management. We also discussed chronic pain referral once her pain is stable. Pt and her partner agreed.     LA  Reviewed and Summarized:          4/18/2023: Pt attended clinic with her partner, Alessio. She reported new bilateral under the rib and lower back pain that has started and worsened over the last two months. She stated she discussed this with Dr. Ba during her appointment on April 13, and a PET scan was ordered to assess for recurrent or metastatic disease. She denied, fever, chills, sweats, or dyspnea. She stated she has been taking Norco 10 four times a day, and MS ER 15mg Daily at night. She states she does not take MS ER in the morning due to fear of sleeping during the day. Pt also reported diarrhea, which has improved with Lomotil, in addition to increased anxiety and depression due to worsening pain.   I encouraged her to take Norco 10mg QID PRN for pain, and MS ER 15mg BID if her pain continues to worsen, and she is fine with mild sedation. Pt stated she is only comfortable  with taking MSER at night. I noted she last filled her Norco   Pt stated she is willing to try an antidepressant for her mood, and she had stopped taking Wellbutrin about a month ago due to palpitations. I informed her that we will complete an EKG to assess her Qtc interval prior to starting an antidepressant. Her last EKG was in May 2021 showing Qtc of 435. Pt agreed to complete EKG.     2/14/2023: Patient comes to clinic with her significant other Alessio. We discussed her recent visits with Dr. Asencio and Deo. She is in good spirits as her Restaging PET scan 1/11/2023 without evidence of recurrent metastatic disease active and no new areas concerning for active disease. She noted she was instructed to continue current treatment for breast cancer and surveillance for cervical cancer. She denies any issues and notes that she continues to be active and her and significant other are going out to eat after visit. She notes that she has intermittent pelvic pain in which she attributes to radiation but notes that current pain medication effective in managing. She notes that it does not happen often. In the setting of stable scans and continued improvement clinically, we dicussed continuing opoid wean. Patient currently taking MSER 15mg po BID and taking 3-4 doses of Norco daily. Will decrease MSER to 15mg po Daily and continue current hydrocodone regimen not to exceed 4 doses in a day. Will follow up in 4 weeks to continue opoid wean. No new scripts needed as MSER and hydrocodone were just filled on 2/8/23.     12/1/22: Patient comes to clinic with her significant other Alessio. She is still feeling sad because of her grand daughter since she moved out. She noted that she stopped taking her Wellbutrin but wants to start back taking it to help with her mood also to help her with the process of quitting smoking. Also she notes she has been dealing with worsening pain to her left ear. She saw Otolaryngologist recently and  he noted no signs of changes on CT and offered patient surgery or non surgical management. Patient notes that she did not want surgery at that time because her doctor made her aware that the procedure may help but there is a possibility that it may not. She notes that the pain and drainage has worsened and she is now thinking about proceeding with surgery even if there is a risk that it will not work. She has been in touch with her Otolaryngologist's office to schedule appointment. She last saw Dr. Asencio 10/13/22 in which there were no changes and she was to continue on current treatment withLetrozole and palbociclib. Her visit with Gyn/onc also noted no new changes and everything was stable. During exam patient reports pelvic pain but notes that medication has been effective in managing it. Being that there were no changes with all of her MD visits, I spoke with patient about continuing plan from her previous PM visit with weaning MSER. She is in agreement, we will wean down to MSER 15mg po BID. I did make patient aware that medications provided by me were not for her ear pain or or acute issues she may experience but it is indicated for malignant pain. I reinforced education about taking medications as prescribed and for what it is indicated for. I made her aware that we could not send in another early prescription as my counterpart had to do in November. On 11/1/22,  patient sent a message noting she ran out of her medication before scheduled visit. She reported that she was [facility] due to abdominal pain and then when she was discharged she cut her foot very badly and had to receive stiches. Due to these issues, she used her medications more frequently than she was prescribed. She was taking 2 tabs of MSER 30mg BID instead of 1 BID. She was taking hydrocodone 4-5x/ day. She admitted that she was not supposed to do this and will not do this again. She was hoping that provider would  write the 15mg ER tabs to hold her over. She explained why she would not write for the 15mg and typically do not write early refills. This is the second time that she has taken ER medications more frequently than prescribed. She was counseled gain on the risks with this behavior and this would be her last warning as well as last early refill. Moving forward if she takes opioids other than rx this is grounds for dismissal from our clinic given issues with impulse control and safety. She expressed understanding and said that her  has also scolded her. She knows this was not a good choice and continues to apologize and promised she will not do it again. My partner explained our boundaries in clinic are not punitive but rather in place for her safety. I was present with my counterpart for the conversation and agreed that early refills will be not be permitted moving forward    10/06/2022  She comes to clinic with DEYSI Mccallum today. She is down because her granddaughter is not in a good situation but otherwise feeling okay. Since we last saw each other her pain has improved which is likely related to the positive response to treatment. She would like to begin weaning the MSER back to 30mg BID and hopefully continue to decrease further. She is still taking hydrocodone 10mg QID with good response. Her mood improved with the resumption of Wellbutrin and is still cutting back on her tobacco use. She has been having ear pain which is being evaluated by ENT and is waiting to hear when her CT is scheduled. There seems to have been a charting error with her Letrozole resulting in d/c the order and unable to refill. I will reach out to oncology to reorder. We will follow up in 2 months and she will message me for refills whether she would like to continue weaning before then. We do not have HCPOA on file even though she remembers bringing to clinic. She will bring again next visit and confirms that her sister David is  "HCPOA.    09/01/22: Ms. Flores comes to clinic today with her fidavid Mccallum. She reports that her lower abdominal pain has worsened since brachytherapy started several weeks ago. She reports spontaneous lower abdominal pain not related to food but sometimes exacerbated by BM. She denies constipation. She has taken an extra dose of MSER at bedtime to help with sleep. We talked about the risks associated with this and would advise against doing this again. She is taking hydrocodone q4hr sometimes waking up at night and needing another dose averaging 4-6 in a day. I recommend we increase the long acting dose in hopes for better control and less PRN usage. She says her sleep is poor because this is when the pain intensifies (lying flat) but also anxiety and sadness since her granddaughter moved out of the home. She is taking Xanax HS which helps. She was taking Wellbutrin in the last but not sure why she stopped. For now lets not resume this and evaluate at her next visit.    06/16/2022: Patient doing okay.  No new issues since her last visit.  She continue to have some lower GI pains describes now as "cramping after a BM".  Previously, her discomfort was after eating.  She has been referred to GI.  She has some fatigue but managing.  Pain is controlled on current regimen.  She has upcoming scans and appointment with Dr. Asencio.  She has some anxiety and life stressors affecting her situation.  Her son and his ex-girfriend (now in [place]) can no longer care for their 4 year old daughter.  She and her partner are assuming the parental rights of her granddaughter.  They seem to be coping as well as can be expected.  Overall, she is doing okay.  We will continue to follow at quarterly intervals.  She knows to reach out if her situation changes or symptom burden worsens.    03/10/2022: Overall, patient seems to be doing okay.  She has some new GI issues with abdominal pain after eating and intermittent diarrhea not felt to be " related to her disease or treatment.  Oncology discussed GI referral and she is considering this if no improvement.  We are adjusting her pain medication regimen today mostly to cut back on her prn Norco which she continues to use consistently at 5-6 tablets/day.  She knows she can reach out for additional questions or concerns.     01/06/2022: Patient is a 51 y.o. year old female presenting with for palliative follow up for her metastatic breast cancer, SCC of the cervix. Treatments currently on hold due to recurrent ear infection. She is anxious about results of her recent scan and fearful of disease progression while off treatment.  Otherwise, her pain continues to be controlled on her current regimen.  No new symptoms or complaints today.    ONCOLOGY DIAGNOSES:  Stage IV cervical squamous cell carcinoma   stage IV, T2 N1b M1, invasive breast carcinoma, ER/LA+, HER2-   Papillary thyroid carcinoma status post total thyroidectomy on 08/31/2017, mpT1b N0    Cervical HSIL MIREILLE 3 s/p LEEP, 2017    Patient looks and feels better compared to last visit.  She walked into her appt.  Ear is better and she is being followed by ENT.  She has resumed treatment for both her cervical and breast cancer and is followed closely by medical oncology and gyn oncology.  Pain continues to be controlled on current medication regimen.  She is having knee issues with swelling and likely OA.  We discussed referral to orthopedics.  She also plans to follow up with neurosurgery.  Overall, no major changes and no new issues.     LA  reviewed and summarized:      OBJECTIVE:     Review of Systems   Constitutional:  Negative for malaise/fatigue and weight loss.   HENT:  Negative for sore throat.    Respiratory:  Positive for shortness of breath (Intermittent on exertion). Negative for cough and wheezing.    Cardiovascular:  Negative for chest pain and palpitations.   Gastrointestinal:  Positive for nausea. Negative for abdominal pain,  constipation and diarrhea.   Musculoskeletal:         Bilateral rib pain   Neurological:  Positive for weakness (S/P stroke. Left-sided weakness). Negative for dizziness, tingling, sensory change, speech change, seizures, loss of consciousness and headaches.   Psychiatric/Behavioral:  Positive for depression. Negative for suicidal ideas. The patient is nervous/anxious and has insomnia.         Review of Symptoms      Symptom Assessment (ESAS 0-10 Scale)  Pain:  8  Dyspnea:  5  Anxiety:  8  Nausea:  0  Depression:  9  Anorexia:  0  Fatigue:  8  Insomnia:  10  Restlessness:  10  Agitation:  10     CAM / Delirium:  Negative  Constipation:  Negative  Diarrhea:  Negative    Anxiety:  Is nervous/anxious  Constipation:  No constipation    Bowel Management Plan (BMP):  Yes      Pain Assessment    Location(s): Abdomen and bilateral ribs    Abdomen       Location: lower        Quality: Aching        Quantity: 5/10 in intensity        Chronicity: Onset 1 year (s) ago, stable       Aggravating Factors: Activity        Alleviating Factors: Opiates       Associated Symptoms: Myalgias  Ribs       Location: bilateral        Quality: Sharp        Quantity: 5/10 in intensity        Chronicity: Onset 3 month(s) ago, stable with opioids       Alleviating Factors: Opiates        Associated Symptoms: Myalgias    Modified Osmar Scale:  0    ECOG Performance Status stGstrstastdstest:st st1st Living Arrangements:  Lives with spouse    Psychosocial/Cultural:   See Palliative Psychosocial Note: No  Lives with her significant other of 16 years: they have 3 children combined, 2 sons from previous marriage. Granddaughter recently moved out of the home  **Primary  to Follow**  Palliative Care  Consult: No    Advance Care Planning   Advance Directives:   Living Will: Yes        Copy on chart: Yes    LaPOST: Yes    Do Not Resuscitate Status: Yes    Agent's Name:  David Acevedo, sister, [phone]    Decision Making:  Patient answered  questions  Goals of Care: What is most important right now is to focus on remaining as independent as possible, symptom/pain control, curative/life-prolongation (regardless of treatment burdens). Accordingly, we have decided that the best plan to meet the patient's goals includes continuing with treatment.      Physical Exam:  Vitals:    Physical Exam  Vitals and nursing note reviewed.   Constitutional:       General: She is not in acute distress.     Appearance: Normal appearance. She is normal weight. She is not ill-appearing.   HENT:      Head: Normocephalic and atraumatic.      Comments: Left facial droop     Nose: Nose normal. No congestion or rhinorrhea.      Mouth/Throat:      Mouth: Mucous membranes are moist.      Pharynx: Oropharynx is clear. No oropharyngeal exudate or posterior oropharyngeal erythema.   Eyes:      General: No scleral icterus.        Right eye: No discharge.         Left eye: No discharge.      Conjunctiva/sclera: Conjunctivae normal.      Pupils: Pupils are equal, round, and reactive to light.   Cardiovascular:      Rate and Rhythm: Normal rate and regular rhythm.      Pulses: Normal pulses.      Heart sounds: Normal heart sounds. No murmur heard.     No gallop.   Pulmonary:      Effort: Pulmonary effort is normal. No respiratory distress.      Breath sounds: Normal breath sounds. No stridor. No wheezing.   Chest:      Chest wall: No tenderness.   Abdominal:      General: Bowel sounds are normal. There is no distension.      Palpations: Abdomen is soft.      Tenderness: There is no abdominal tenderness.   Musculoskeletal:         General: Tenderness (lateral ribs L>R) present. No swelling. Normal range of motion.      Cervical back: Normal range of motion and neck supple.      Right lower leg: No edema.      Left lower leg: No edema.      Comments: S/P stroke. Left arm and left leg weakness   Skin:     General: Skin is warm and dry.      Capillary Refill: Capillary refill takes less than  2 seconds.      Coloration: Skin is not jaundiced or pale.      Findings: No rash.   Neurological:      Mental Status: She is alert and oriented to person, place, and time.      GCS: GCS eye subscore is 4. GCS verbal subscore is 5. GCS motor subscore is 6.      Motor: Weakness (s/p stroke June 02/ Left sided weakness) present.      Gait: Gait abnormal (Limping).   Psychiatric:         Attention and Perception: Attention normal.         Mood and Affect: Mood is anxious.         Speech: Speech normal.         Behavior: Behavior normal.         Thought Content: Thought content normal.         Cognition and Memory: Cognition normal.         Judgment: Judgment normal.        Radiology and laboratory data reviewed    ASSESSMENT/PLAN:     Malignant neoplasm of overlapping sites of right breast in female, estrogen receptor positive  -Followed by Dr. Joseph. Previously Dr. Asencio  -On Surveillance- Continues Letrozole and Ibrance  -PET scan 5/03: There is a focal mildly hypermetabolic ground-glass opacity in the left lower lobe which is new from the prior examination. This is more likely infectious/inflammatory in nature. Short-term CT chest follow-up is advised.  CT C/A/P: 10/31/2023 1. No definite evidence for metastatic disease in the chest, abdomen, or pelvis on this unenhanced exam.  2. Stable subpleural 7 mm right upper lobe pulmonary nodule which showed no FDG avidity on the prior PET-CT.  3. Evidence of prior granulomatous disease in the chest and spleen    Malignant neoplasm of endocervix  -Followed by  noted JUAN FRANCISCO  -Restaging scan 5/03/2023 without evidence of recurrent metastatic disease active  -Holding further maintenance immunotherapy at this time given no evidence of recurrent / progressive metastatic disease    -Surveillance for cervical cancer.    Neoplasm-related pain   -Stable Rib pain-Unclear etiology, as scans have been stable.  -Stop MSER as it is not effective, and trial opioid weaning in the  setting of stable scans.  - Pt prefers to stop long-acting than decrease Norco frequency.  -Continue Norco 10mg QID PRN not to exceed 4 doses in a day. Pt unable to wean due to persistent rib pain.  -Pt open to chronic pain management referral given no evidence of cancer recurrence or metastasis  -Will continue weaning, as chronic pain referral may take at least two months.   UDS-7/12/2023: Showed Oxycodone, marijuana, and Morphine.  -Referral to [facility] for Medical Marijuana RX  Narcan rx- Pt stated she knows how to use it     Anxiety/Depression  -Uncontrolled due to financial stress.   -Continue Xanax 0.5mg-1mg BID PRN  -Continue Duloxetine 30mg Daily. Will maintain current dose due to renal function.  -Referral to [facility] for Medical Marijuana RX  -Referral to Hem/Onc Psych  -SW referral to assist with grants for car repair.    Palliative Care Encounter  -HCPOA:  David Acevedo, sister, [phone]  -Code Status: DNR; LaPOST completed and uploaded in EMR  -[facility]-Continue cancer treatment with surveillance, symptom management, improve functional status with rehabilitation.      Follow up: 3 months pending chronic pain referral    40 minutes of total time spent on the encounter, which includes face to face time and non-face to face time preparing to see the patient (eg, review of tests), Obtaining and/or reviewing separately obtained history, Documenting clinical information in the electronic or other health record, Independently interpreting results (not separately reported) and communicating results to the patient/family/caregiver, or Care coordination (not separately reported).    Signature: SUZIE GOTTI NP

## 2023-10-31 NOTE — PATIENT INSTRUCTIONS
We refilled your Cymbalta 30mg. Take 1 tablet daily   Tell EB if and when you want to start therapy for anxiety and depression

## 2023-11-01 ENCOUNTER — TELEPHONE (OUTPATIENT)
Dept: HEMATOLOGY/ONCOLOGY | Facility: CLINIC | Age: 53
End: 2023-11-01
Payer: MEDICAID

## 2023-11-01 NOTE — TELEPHONE ENCOUNTER
DAYANNA consulted to provide resources for financial assistance. Pt's vehicle is down at this time. Pt stated that they were able to get a motor but now need oxygen sensors that are very costly. Pt needs transportation to make it to her appt's.  DAYANNA researched [employer] and emailed pt the following bill resources that may be able to assist financially with vehicle repairs: 1. [facility] [phone], 2. [facility] [phone], 3: [email or web address], 4: Yasmeen Landis [phone]. Pt will call DAYANNA if additional assistance is needed. SW will remain available.

## 2023-11-06 DIAGNOSIS — C50.811 MALIGNANT NEOPLASM OF OVERLAPPING SITES OF RIGHT BREAST IN FEMALE, ESTROGEN RECEPTOR POSITIVE: ICD-10-CM

## 2023-11-06 DIAGNOSIS — Z17.0 MALIGNANT NEOPLASM OF OVERLAPPING SITES OF RIGHT BREAST IN FEMALE, ESTROGEN RECEPTOR POSITIVE: ICD-10-CM

## 2023-11-06 RX ORDER — LETROZOLE 2.5 MG/1
2.5 TABLET, FILM COATED ORAL DAILY
Qty: 30 TABLET | Refills: 2 | Status: SHIPPED | OUTPATIENT
Start: 2023-11-06 | End: 2024-11-05

## 2023-11-14 DIAGNOSIS — C79.81 METASTATIC MALIGNANT NEOPLASM TO BREAST: Primary | ICD-10-CM

## 2023-11-14 DIAGNOSIS — C55 MALIGNANT NEOPLASM OF UTERUS, UNSPECIFIED SITE: ICD-10-CM

## 2023-11-14 DIAGNOSIS — F41.9 ANXIETY: ICD-10-CM

## 2023-11-14 DIAGNOSIS — G89.3 NEOPLASM RELATED PAIN: ICD-10-CM

## 2023-11-14 RX ORDER — ALPRAZOLAM 1 MG/1
.5-1 TABLET ORAL 2 TIMES DAILY PRN
Qty: 60 TABLET | Refills: 0 | Status: SHIPPED | OUTPATIENT
Start: 2023-11-14 | End: 2023-12-14 | Stop reason: SDUPTHER

## 2023-11-16 NOTE — ED NOTES
Spelling of pt's name and  confirmed by pt and verified on pt's EMR, wristband, and labels.   denies pain/discomfort (Rating = 0)

## 2023-11-17 DIAGNOSIS — G89.3 NEOPLASM RELATED PAIN: ICD-10-CM

## 2023-11-17 DIAGNOSIS — C79.81 METASTATIC MALIGNANT NEOPLASM TO BREAST: ICD-10-CM

## 2023-11-20 RX ORDER — HYDROCODONE BITARTRATE AND ACETAMINOPHEN 10; 325 MG/1; MG/1
1 TABLET ORAL EVERY 6 HOURS PRN
COMMUNITY
End: 2023-11-20 | Stop reason: SDUPTHER

## 2023-11-20 RX ORDER — HYDROCODONE BITARTRATE AND ACETAMINOPHEN 10; 325 MG/1; MG/1
1 TABLET ORAL EVERY 6 HOURS PRN
Qty: 120 TABLET | Refills: 0 | Status: SHIPPED | OUTPATIENT
Start: 2023-11-20 | End: 2023-12-19 | Stop reason: SDUPTHER

## 2023-11-20 RX ORDER — HYDROCODONE BITARTRATE AND ACETAMINOPHEN 10; 325 MG/1; MG/1
1 TABLET ORAL EVERY 6 HOURS PRN
Status: CANCELLED | OUTPATIENT
Start: 2023-11-20

## 2023-11-20 NOTE — PROGRESS NOTES
HEMATOLOGY / ONCOLOGY   CLINIC NOTE     ONCOLOGICAL HISTORY:     Diagnosis:  - Stage IV cervical squamous cell carcinoma  - stage IV, T2 N1b M1, invasive breast carcinoma, ER/ME+, HER2-  - Papillary thyroid carcinoma status post total thyroidectomy on 08/31/2017, mpT1b N0   - Cervical HSIL MIREILLE 3 s/p LEEP, 2017    Treatment History:  - Palliative chemotherapy: cisplatin, paclitaxel and bevacizumab; 02/22/2021 cycle 1 day 1 -> bevacizumab maintenance after 6 cycles  - Carboplatin, paclitaxel and pembrolizumab, C1D1 1/28/22; maintenance pembrolizumab, d/c 9/2022    Current Treatment:   - Letrozole and palbociclib     Subjective:       Chief Complaint: Cancer and Follow-up      HPI    Josey Flores  53 y.o.  female with past medical history significant for CVA, thyroid cancer, cervical cancer and breast cancer here for follow-up    Interval History:     Patient is here for follow-up.  She is compliant with her medications and denies any issues.  Denies any nausea, vomiting, diarrhea, numbness, chest pain, shortness of breath, palpitations or any other concerns.  Denies any abdominal pain or any other pains.    Oncology History   Secondary cancer of bone    Chemotherapy    Treatment Summary   Plan Name: OP GYN PACLITAXEL CISPLATIN BEVACIZUMAB Q3W  Treatment Goal: Control  Status: Inactive  Start Date: 2/22/2021  End Date: 7/7/2021  Provider: Estefani Asencio MD  Chemotherapy: CISplatin (PLATINOL) 50 mg/m2 = 108 mg in sodium chloride 0.9% 608 mL chemo infusion, 50 mg/m2 = 108 mg, Intravenous, Clinic/HOD 1 time, 6 of 6 cycles  Administration: 108 mg (2/22/2021), 108 mg (3/15/2021), 108 mg (4/6/2021), 108 mg (5/11/2021), 108 mg (6/1/2021), 108 mg (6/22/2021)    bevacizumab (AVASTIN) 15 mg/kg = 1,480 mg in sodium chloride 0.9% 100 mL chemo infusion, 15 mg/kg = 1,480 mg, Intravenous, Clinic/HOD 1 time, 6 of 6 cycles  Administration: 1,480 mg (4/6/2021), 1,480 mg (2/23/2021), 1,480 mg (3/16/2021), 1,480 mg  (6/1/2021), 1,480 mg (6/22/2021), 1,480 mg (5/12/2021)    PACLitaxeL (TAXOL) 175 mg/m2 = 378 mg in sodium chloride 0.9% 563 mL chemo infusion, 175 mg/m2 = 378 mg, Intravenous, Clinic/HOD 1 time, 6 of 6 cycles  Administration: 378 mg (2/22/2021), 378 mg (3/15/2021), 378 mg (4/6/2021), 378 mg (5/11/2021), 378 mg (6/1/2021), 378 mg (6/22/2021)    Plan Name: OP BEVACIZUMAB Q3W   Treatment Goal: Maintenance  Status: Inactive  Start Date: 7/13/2021  End Date: 12/28/2021  Provider: Estefani Asencio MD  Chemotherapy: bevacizumab (AVASTIN) 1,300 mg in sodium chloride 0.9% 100 mL chemo infusion, 1,345 mg, Intravenous, Clinic/HOD 1 time, 9 of 17 cycles  Administration: 1,300 mg (7/13/2021), 1,345 mg (8/3/2021), 1,300 mg (8/24/2021), 1,300 mg (9/14/2021), 1,345 mg (10/5/2021), 1,345 mg (10/26/2021), 1,345 mg (11/16/2021), 1,300 mg (12/7/2021), 1,300 mg (12/28/2021)    Plan Name: OP CARBOPLATIN PACLITAXEL PEMBROLIZUMAB Q3W FOLLOWED BY MAINTENANCE PEMBROLIZUMAB 400 MG Q6W  Treatment Goal: Control  Status: Inactive  Start Date: 1/28/2022  End Date: 8/11/2022  Provider: Estefani Asencio MD  Chemotherapy: CARBOplatin (PARAPLATIN) 425 mg in sodium chloride 0.9% 292.5 mL chemo infusion, 425 mg, Intravenous, Clinic/HOD 1 time, 6 of 6 cycles  Dose modification:   (original dose 424 mg, Cycle 6),   (original dose 424 mg, Cycle 6)  Administration: 425 mg (1/28/2022), 495 mg (2/18/2022), 525 mg (6/2/2022), 525 mg (5/12/2022), 525 mg (3/11/2022), 495 mg (4/11/2022)    PACLitaxeL (TAXOL) 175 mg/m2 = 366 mg in sodium chloride 0.9% 561 mL chemo infusion, 175 mg/m2 = 366 mg (100 % of original dose 175 mg/m2), Intravenous, Clinic/[facility] 1 time, 6 of 6 cycles  Dose modification: 175 mg/m2 (original dose 175 mg/m2, Cycle 1), 175 mg/m2 (original dose 175 mg/m2, Cycle 4)  Administration: 366 mg (1/28/2022), 366 mg (2/18/2022), 366 mg (3/11/2022), 366 mg (4/11/2022), 366 mg (5/12/2022), 366 mg (6/2/2022)       Malignant neoplasm of endocervix    9/10/2020 Imaging Significant Findings    CT A/P: Negative for ascites, omental caking, lymphadenopathy, or a gross cervical mass     12/7/2020 Biopsy    EMBX: SCC, p16+     1/12/2021 Imaging Significant Findings    MRI Pelvis w/ w/o  4.9 cm cervical mass  R parametrial invasion  R external iliac lymphadenopathy     1/27/2021 Imaging Significant Findings    PET/CT: FDG avidity in the chest     2/18/2021 Biopsy    Endoscopic biopsy of the lung: +      2/22/2021 - 6/29/2021 Chemotherapy    Cisplatin/paclitaxel/avastin x6     3/12/2021 Genetic Testing    STRATA: FRANK, PIK3c, Ep300, ERBB3, KDM6A     4/24/2021 Imaging Significant Findings    CT Pelvis w/: JUAN FRANCISCO with a normal cervix     4/26/2021 Imaging Significant Findings    PET/CT: No FDG avidity     5/18/2021 Imaging Significant Findings    CT A/P w/: JUAN FRANCISCO     6/5/2021 Imaging Significant Findings    CT A/P w/: JUAN FRANCISCO     7/12/2021 Imaging Significant Findings    PET/CT: JUAN FRANCISCO     7/13/2021 - 12/28/2021 Maintenance Therapy    Avastin x 9     10/20/2021 Imaging Significant Findings    PET/CT: JUAN FRANCISCO     1/11/2022 Imaging Significant Findings    PET/CT: Increased FDG avidity in the cervix.  No other evidence of disease.     1/28/2022 - 6/2/2022 Chemotherapy    Carboplatin/paclitaxel/pembrolizumab x 6     3/25/2022 Imaging Significant Findings    PET/CT: Persistent FDG avidity in the cervix.  New FDG avidity in the inguinal lymph nodes (although it does not look suspicious)     4/25/2022 - 4/29/2022 Radiation Therapy    Treating physician: Dr. Vázquez (MRI guided T&O)  Total Dose: 30 Gy  Fractions: 5     6/30/2022 - 8/11/2022 Maintenance Therapy    Pembrolizumab 400 mg IV x2     8/5/2022 Imaging Significant Findings    PET/CT: JUAN FRANCISCO     10/16/2022 Imaging Significant Findings    CT A/P w/ (Women's without imaging available): JUAN FRANCISCO     1/11/2023 Imaging Significant Findings    PET/CT: JUAN FRANCISCO      Chemotherapy    Treatment Summary   Plan Name: OP GYN PACLITAXEL CISPLATIN BEVACIZUMAB  Q3W  Treatment Goal: Control  Status: Inactive  Start Date: 2/22/2021  End Date: 7/7/2021  Provider: Estefani Asencio MD  Chemotherapy: CISplatin (PLATINOL) 50 mg/m2 = 108 mg in sodium chloride 0.9% 608 mL chemo infusion, 50 mg/m2 = 108 mg, Intravenous, Clinic/HOD 1 time, 6 of 6 cycles  Administration: 108 mg (2/22/2021), 108 mg (3/15/2021), 108 mg (4/6/2021), 108 mg (5/11/2021), 108 mg (6/1/2021), 108 mg (6/22/2021)    bevacizumab (AVASTIN) 15 mg/kg = 1,480 mg in sodium chloride 0.9% 100 mL chemo infusion, 15 mg/kg = 1,480 mg, Intravenous, Clinic/HOD 1 time, 6 of 6 cycles  Administration: 1,480 mg (4/6/2021), 1,480 mg (2/23/2021), 1,480 mg (3/16/2021), 1,480 mg (6/1/2021), 1,480 mg (6/22/2021), 1,480 mg (5/12/2021)    PACLitaxeL (TAXOL) 175 mg/m2 = 378 mg in sodium chloride 0.9% 563 mL chemo infusion, 175 mg/m2 = 378 mg, Intravenous, Clinic/HOD 1 time, 6 of 6 cycles  Administration: 378 mg (2/22/2021), 378 mg (3/15/2021), 378 mg (4/6/2021), 378 mg (5/11/2021), 378 mg (6/1/2021), 378 mg (6/22/2021)    Plan Name: OP BEVACIZUMAB Q3W   Treatment Goal: Maintenance  Status: Inactive  Start Date: 7/13/2021  End Date: 12/28/2021  Provider: Estefani Asencio MD  Chemotherapy: bevacizumab (AVASTIN) 1,300 mg in sodium chloride 0.9% 100 mL chemo infusion, 1,345 mg, Intravenous, Clinic/HOD 1 time, 9 of 17 cycles  Administration: 1,300 mg (7/13/2021), 1,345 mg (8/3/2021), 1,300 mg (8/24/2021), 1,300 mg (9/14/2021), 1,345 mg (10/5/2021), 1,345 mg (10/26/2021), 1,345 mg (11/16/2021), 1,300 mg (12/7/2021), 1,300 mg (12/28/2021)    Plan Name: OP CARBOPLATIN PACLITAXEL PEMBROLIZUMAB Q3W FOLLOWED BY MAINTENANCE PEMBROLIZUMAB 400 MG Q6W  Treatment Goal: Control  Status: Inactive  Start Date: 1/28/2022  End Date: 8/11/2022  Provider: Estefani Asencio MD  Chemotherapy: CARBOplatin (PARAPLATIN) 425 mg in sodium chloride 0.9% 292.5 mL chemo infusion, 425 mg, Intravenous, Clinic/[facility] 1 time, 6 of 6 cycles  Dose modification:    (original dose 424 mg, Cycle 6),   (original dose 424 mg, Cycle 6)  Administration: 425 mg (1/28/2022), 495 mg (2/18/2022), 525 mg (6/2/2022), 525 mg (5/12/2022), 525 mg (3/11/2022), 495 mg (4/11/2022)    PACLitaxeL (TAXOL) 175 mg/m2 = 366 mg in sodium chloride 0.9% 561 mL chemo infusion, 175 mg/m2 = 366 mg (100 % of original dose 175 mg/m2), Intravenous, Clinic/[facility] 1 time, 6 of 6 cycles  Dose modification: 175 mg/m2 (original dose 175 mg/m2, Cycle 1), 175 mg/m2 (original dose 175 mg/m2, Cycle 4)  Administration: 366 mg (1/28/2022), 366 mg (2/18/2022), 366 mg (3/11/2022), 366 mg (4/11/2022), 366 mg (5/12/2022), 366 mg (6/2/2022)           Past Medical History:   Diagnosis Date    Anxiety     Asthma     Breast cancer 2017    Cancer     right breast, metastatic-lymph nodes, bone, and thyroid     COPD (chronic obstructive pulmonary disease)     Depression     History of psychiatric hospitalization     2000; suicidal ideation    Hx of psychiatric care     Hypothyroidism     Miscarriage     five miscarriages    Obesity     Psychiatric problem     Thyroid cancer 2017      Past Surgical History:   Procedure Laterality Date    ABSCESS DRAINAGE      bilateral axilla    ADENOIDECTOMY      APPENDECTOMY      BREAST BIOPSY Right     x3    CHOLECYSTECTOMY      ENDOBRONCHIAL ULTRASOUND Bilateral 02/18/2021    Procedure: ENDOBRONCHIAL ULTRASOUND (EBUS);  Surgeon: Jaron Ni MD;  Location: [facility] ENDO;  Service: Pulmonary;  Laterality: Bilateral;    FLUOROSCOPY N/A 01/28/2021    Procedure: Mediport placement;  Surgeon: Noah Phelan MD;  Location: [facility] CATH LAB;  Service: General;  Laterality: N/A;    MASS EXCISION      MEDIPORT INSERTION, SINGLE Right 02/2021    SKIN GRAFT  06/16/2017    THYROIDECTOMY Bilateral     TONSILLECTOMY       Social History     Socioeconomic History    Marital status:     Number of children: 2   Tobacco Use    Smoking status: Every Day     Current packs/day: 1.00     Average packs/day: 1  pack/day for 38.9 years (38.9 ttl pk-yrs)     Types: Cigarettes     Start date: 1/1/1985    Smokeless tobacco: Never    Tobacco comments:     45 pack year history   Substance and Sexual Activity    Alcohol use: No    Drug use: No    Sexual activity: Not Currently     Partners: Male     Birth control/protection: None   Social History Narrative     with two adult children; common law marriage (10+years); raised [employer], no current Baptism practice      Family History   Problem Relation Age of Onset    Arthritis Mother     Early death Mother         64 at time of death    Heart attack Mother     Heart disease Mother     Heart failure Mother     Hypertension Mother     Coronary artery disease Father     Hearing loss Father     Heart disease Father     Hyperlipidemia Father     Hypertension Father     Mental illness Father     Learning disabilities Son     Cancer Maternal Aunt           Review of patient's allergies indicates:   Allergen Reactions    Gabapentin Swelling     Note: unilateral joint edema, unclear if due to gabapentin    Nsaids (non-steroidal anti-inflammatory drug) Other (See Comments)     Instructed not to take NSAID drugs with chemo pill.    Zometa [zoledronic acid] Other (See Comments)     Possible osteonecrosis jaw    Clindamycin Rash    Vancomycin analogues Itching      Review of Systems   Constitutional: Negative.  Negative for activity change, chills, fatigue and fever.   HENT: Negative.     Eyes: Negative.    Respiratory:  Negative for cough and shortness of breath.    Cardiovascular:  Negative for chest pain and leg swelling.   Gastrointestinal:  Negative for constipation, diarrhea, nausea and vomiting.   Endocrine: Negative.    Genitourinary: Negative.    Musculoskeletal:  Negative for arthralgias and myalgias.   Integumentary:  Negative.   Allergic/Immunologic: Negative.    Neurological:  Negative for light-headedness, numbness and headaches.   Hematological: Negative.     Psychiatric/Behavioral: Negative.           Objective:        Vitals:    11/21/23 1604   BP: 127/75   Pulse: 107   Temp: 97 °F (36.1 °C)        Physical Exam  Constitutional:       General: She is not in acute distress.     Appearance: She is well-developed. She is not ill-appearing.   HENT:      Head: Normocephalic and atraumatic.      Mouth/Throat:      Mouth: Mucous membranes are moist.   Eyes:      Extraocular Movements: Extraocular movements intact.      Conjunctiva/sclera: Conjunctivae normal.   Cardiovascular:      Rate and Rhythm: Normal rate and regular rhythm.      Heart sounds: Normal heart sounds.   Pulmonary:      Effort: Pulmonary effort is normal. No respiratory distress.      Breath sounds: Normal breath sounds. No wheezing.   Abdominal:      General: There is no distension.      Palpations: Abdomen is soft.      Tenderness: There is no abdominal tenderness.   Musculoskeletal:         General: Normal range of motion.      Cervical back: Normal range of motion and neck supple.   Skin:     General: Skin is warm.   Neurological:      Mental Status: She is alert and oriented to person, place, and time. Mental status is at baseline.   Psychiatric:         Mood and Affect: Mood normal.           LABS / IMAGING      - 10/31/2023 CT CAP  1. No definite evidence for metastatic disease in the chest, abdomen, or pelvis on this unenhanced exam.  2. Stable subpleural 7 mm right upper lobe pulmonary nodule which showed no FDG avidity on the prior PET-CT.  3. Evidence of prior granulomatous disease in the chest and spleen.                                       Assessment:         Metastatic breast cancer ER positive HER2 negative:    - Had been on systemic therapy with palbociclib and letrozole.       Bony metastatic disease:    - Prior bisphosphonate use discontinued due to osteonecrosis jaw following with ENT.    Metastatic squamous cell carcinoma of the cervix SCC cervix:    - history of HSIL Status post LEEP  2017.   - In December 2020 follow-up with gynecology with new spotty vaginal bleeding/discharge with biopsy 12/7/20 showing squamous cell carcinoma consistent with cervical primary. MRI pelvis performed showing locally advanced disease with 4.9 cm cervical mass with right parametrial involvement and enlarged right external iliac lymph node 1.5 x 1.2 cm. PET-CT showing avidity of cervical mass with extension to lower uterine segment, right vaginal focus of avidity and lymphadenopathy including right internal external iliac, periaortic, pericaval. There is the new hypermetabolic lymphadenopathy in right subcarinal 2.3 cm SUV 5.8. Small 9 mm right paratracheal S base slightly increased no FDG avid SUV 2.4.    - 01/29/2021 multidisciplinary tumor board discussion of her case with review of imaging; concern for new avid lymphadenopathy particularly in right subcarinal may be most consistent with metastatic cervical squamous cell carcinoma; tumor board recommendation for biopsy to confirm for prognostic information as well as given other concomitant malignancy to exclude alternative histology, although felt less likely metastatic breast given this is been well controlled and primarily bony disease involvement. We discussed recommendation for systemic chemotherapy given advanced cervical squamous cell carcinoma with cisplatin, paclitaxel and bevacizumab with discontinuation of palbociclib but can continue aromatase inhibitor; taxane may also be active for her concomitant metastatic breast cancer.   - EBUS with biopsy 2/18/21, station 7 consistent with metastatic squamous cell carcinoma of cervical origin.  - STRATA requested on cervical SCC, ERBB3 <5% felt to be subclonal per report, PIK3CA amplification, KP4580, KDM6A, FRANK, TMB low, PDL1 high may indicated sensitivity to check point inhibitor in future regimen.  - audiology exam 2/18/21, recommended that she let us know if any perceived change in her hearing throughout  treatment and recommend repeat testing throughout to ensure no ototoxicity.  - [facility] germline genetic testing returned negative for mutation, provided to patient.  - cycle 1 day 1 cisplatin paclitaxel and bevacizumab on 02/22/2021  - Repeat scans 04/26/2021 showed excellent improvement in thoracic and pelvic lymphadenopathy and primary cervical mass.  She is asymptomatic from this no further bleeding.  Recommended continuation of her current regimen which she is tolerating well with supportive care, IV fluids 3 times weekly per patient preference.  - restaging after cycle 6 she has continued improvement in metastatic cervical squamous cell carcinoma; avidity in left vulvar area diminished associated with known infection.  She has required substantial supportive care on chemotherapy and recommend given sustained improvement on scans continuing and absence of chemotherapy with bevacizumab alone and continue close surveillance.  She would like to continue IV fluids weekly as needed.     We reviewed in detail restaging PET-CT January 2022 notable for uptake in cervical region concerning for oligo progression/recurrence of her disease.  Previously biopsy proven metastatic cervical cancer in lungs without evidence of recurrent mass/lymphadenopathy/avidity in this region or otherwise.     Given prior PDL1 high disease CPS >1 we decided to proceed with chemoimmunotherapy per Keynote 826, carboplatin, paclitaxel and pembrolizumab.   She completed 6 cycles of carboplatin paclitaxel with pembrolizumab (1 cycle without pembrolizumab from unclear reasons with my colleague) with interval brachytherapy with Dr. Vázquez at [facility].     Restaging PET scan with diminished avidity vaginal cuff status post brachytherapy.  No new areas concerning for active disease.  We discussed consideration of holding further maintenance immunotherapy at this time given no evidence of recurrent / progressive metastatic disease  and is amenable to this.  Will continue her breast cancer treatment per below.     5/2023 last restaging PET without evidence of recurrent progressive metastatic disease but there were changes in lung for which we obtain short interval follow-up CT chest with resolution left lower lobe abnormalities most likely infectious inflammatory.  Unfortunately she is had interval CVA now on Plavix undergoing physical therapy recommend follow-up with this and Neurology and tobacco cessation.  Restaging scans in October 2023 if otherwise clinically stable from metastatic disease standpoint.  She is continued on letrozole and Ibrance.  Interval skin groin infection status post Bactrim antibiotic course completed.  Will repeat labs if within acceptable limits okay to restart Ibrance.    history of papillary thyroid carcinoma status post total thyroidectomy on 08/31/2017:   s/p total thyroidectomy on levothyroxine.      Hypothyroidism:   - levothyroxine 275 mcg daily.  Repeat thyroid function labs for monitoring.     Macrocytosis:   - Possibly related to thyroid disorder will continue to monitor.  She is on vitamin-B supplement.  No new anemia.     Neuropathy:    - Mild, will monitor      tobacco abuse:    - Currently working on tobacco cessation after CVA June 2023 on nicotine replacement, congratulated on effort and continued cessation encouraged      Plan:     - images reviewed and discussed with the patient, stable with no evidence of metastatic disease  - discussed the importance of regular follow-up to monitor for toxicity from the medications, repeat labs ordered but patient left even before the labs were done. Patient's lab has not been monitored for toxicity since September 2023 and thus would have another refill after the labs have been tested by next month.   - started with cyanocobalamin subcutaneous and also on folic acid for low-level noted in September 2023  - repeat imaging in 3 months  - MD / LABS VISIT - 4 WEEKS          Med Onc Chart Routing      Follow up with physician    Follow up with MIGUEL    Infusion scheduling note   PORT FLUSH TODAY   Injection scheduling note    Labs CBC and CMP   Scheduling:  Preferred lab:  Lab interval:  Labs today and before next visit   Imaging    Pharmacy appointment    Other referrals                    The patient was seen, interviewed and examined. Pertinent lab and radiology studies were reviewed. Pt instructed to call should develop concerning signs/symptoms or have further questions.       Portions of the record may have been created with voice recognition software. Occasional wrong-word or sound-a-like substitutions may have occurred due to the inherent limitations of voice recognition software. Read the chart carefully and recognize, using context, where substitutions have occurred.    Isai Cody MD  Hematology / Oncology

## 2023-11-21 ENCOUNTER — DOCUMENTATION ONLY (OUTPATIENT)
Dept: PALLIATIVE MEDICINE | Facility: CLINIC | Age: 53
End: 2023-11-21
Payer: MEDICAID

## 2023-11-21 ENCOUNTER — HOSPITAL ENCOUNTER (EMERGENCY)
Facility: HOSPITAL | Age: 53
Discharge: LEFT BEFORE DISCHARGE | End: 2023-11-21
Payer: MEDICAID

## 2023-11-21 ENCOUNTER — OFFICE VISIT (OUTPATIENT)
Dept: HEMATOLOGY/ONCOLOGY | Facility: CLINIC | Age: 53
End: 2023-11-21
Payer: MEDICAID

## 2023-11-21 VITALS
DIASTOLIC BLOOD PRESSURE: 72 MMHG | OXYGEN SATURATION: 98 % | HEART RATE: 106 BPM | BODY MASS INDEX: 35.6 KG/M2 | RESPIRATION RATE: 16 BRPM | WEIGHT: 227.31 LBS | TEMPERATURE: 99 F | SYSTOLIC BLOOD PRESSURE: 123 MMHG

## 2023-11-21 VITALS
HEART RATE: 107 BPM | DIASTOLIC BLOOD PRESSURE: 75 MMHG | HEIGHT: 67 IN | OXYGEN SATURATION: 96 % | BODY MASS INDEX: 35.68 KG/M2 | SYSTOLIC BLOOD PRESSURE: 127 MMHG | WEIGHT: 227.31 LBS | TEMPERATURE: 97 F

## 2023-11-21 DIAGNOSIS — D53.9 MACROCYTIC ANEMIA: ICD-10-CM

## 2023-11-21 DIAGNOSIS — C77.3 MALIGNANT NEOPLASM METASTATIC TO LYMPH NODE OF AXILLA: Primary | ICD-10-CM

## 2023-11-21 DIAGNOSIS — E03.9 HYPOTHYROIDISM, UNSPECIFIED TYPE: ICD-10-CM

## 2023-11-21 DIAGNOSIS — C50.919 PRIMARY MALIGNANT NEOPLASM OF BREAST WITH METASTASIS TO OTHER SITE, UNSPECIFIED LATERALITY: ICD-10-CM

## 2023-11-21 DIAGNOSIS — C53.9 RECURRENT CERVICAL CANCER: ICD-10-CM

## 2023-11-21 PROCEDURE — 99215 OFFICE O/P EST HI 40 MIN: CPT | Mod: PBBFAC | Performed by: INTERNAL MEDICINE

## 2023-11-21 PROCEDURE — 3074F SYST BP LT 130 MM HG: CPT | Mod: CPTII,,, | Performed by: INTERNAL MEDICINE

## 2023-11-21 PROCEDURE — 3078F PR MOST RECENT DIASTOLIC BLOOD PRESSURE < 80 MM HG: ICD-10-PCS | Mod: CPTII,,, | Performed by: INTERNAL MEDICINE

## 2023-11-21 PROCEDURE — 3008F BODY MASS INDEX DOCD: CPT | Mod: CPTII,,, | Performed by: INTERNAL MEDICINE

## 2023-11-21 PROCEDURE — 3044F PR MOST RECENT HEMOGLOBIN A1C LEVEL <7.0%: ICD-10-PCS | Mod: CPTII,,, | Performed by: INTERNAL MEDICINE

## 2023-11-21 PROCEDURE — 1160F RVW MEDS BY RX/DR IN RCRD: CPT | Mod: CPTII,,, | Performed by: INTERNAL MEDICINE

## 2023-11-21 PROCEDURE — 99214 PR OFFICE/OUTPT VISIT, EST, LEVL IV, 30-39 MIN: ICD-10-PCS | Mod: S$PBB,,, | Performed by: INTERNAL MEDICINE

## 2023-11-21 PROCEDURE — 3008F PR BODY MASS INDEX (BMI) DOCUMENTED: ICD-10-PCS | Mod: CPTII,,, | Performed by: INTERNAL MEDICINE

## 2023-11-21 PROCEDURE — 3044F HG A1C LEVEL LT 7.0%: CPT | Mod: CPTII,,, | Performed by: INTERNAL MEDICINE

## 2023-11-21 PROCEDURE — 3074F PR MOST RECENT SYSTOLIC BLOOD PRESSURE < 130 MM HG: ICD-10-PCS | Mod: CPTII,,, | Performed by: INTERNAL MEDICINE

## 2023-11-21 PROCEDURE — 99214 OFFICE O/P EST MOD 30 MIN: CPT | Mod: S$PBB,,, | Performed by: INTERNAL MEDICINE

## 2023-11-21 PROCEDURE — 99900041 HC LEFT WITHOUT BEING SEEN- EMERGENCY

## 2023-11-21 PROCEDURE — 1159F PR MEDICATION LIST DOCUMENTED IN MEDICAL RECORD: ICD-10-PCS | Mod: CPTII,,, | Performed by: INTERNAL MEDICINE

## 2023-11-21 PROCEDURE — 99999 PR PBB SHADOW E&M-EST. PATIENT-LVL V: CPT | Mod: PBBFAC,,, | Performed by: INTERNAL MEDICINE

## 2023-11-21 PROCEDURE — 1160F PR REVIEW ALL MEDS BY PRESCRIBER/CLIN PHARMACIST DOCUMENTED: ICD-10-PCS | Mod: CPTII,,, | Performed by: INTERNAL MEDICINE

## 2023-11-21 PROCEDURE — 1159F MED LIST DOCD IN RCRD: CPT | Mod: CPTII,,, | Performed by: INTERNAL MEDICINE

## 2023-11-21 PROCEDURE — 3078F DIAST BP <80 MM HG: CPT | Mod: CPTII,,, | Performed by: INTERNAL MEDICINE

## 2023-11-21 PROCEDURE — 99999 PR PBB SHADOW E&M-EST. PATIENT-LVL V: ICD-10-PCS | Mod: PBBFAC,,, | Performed by: INTERNAL MEDICINE

## 2023-11-21 RX ORDER — CYANOCOBALAMIN 1000 UG/ML
INJECTION, SOLUTION INTRAMUSCULAR; SUBCUTANEOUS
Qty: 9 ML | Refills: 0 | Status: SHIPPED | OUTPATIENT
Start: 2023-11-21 | End: 2024-02-14

## 2023-11-21 RX ORDER — FOLIC ACID 1 MG/1
1 TABLET ORAL DAILY
Qty: 100 TABLET | Refills: 3 | Status: SHIPPED | OUTPATIENT
Start: 2023-11-21 | End: 2024-11-20

## 2023-11-21 NOTE — PROGRESS NOTES
External Chronic Pain Management Referral  Nurse received referral for CMP from  for Mrs. Flores, images, palliative last clinic note, demographics, and insurance information has been faxed with a completed filled out referral from CMP new patient referral form. Nurse called pt to update on this information, no answer, voice message left to call office regarding information about the referral to CMP

## 2023-11-24 ENCOUNTER — TELEPHONE (OUTPATIENT)
Dept: HEMATOLOGY/ONCOLOGY | Facility: CLINIC | Age: 53
End: 2023-11-24
Payer: MEDICAID

## 2023-11-24 NOTE — TELEPHONE ENCOUNTER
SW attempted to reach pt re: recent distress score. No answer. SW left vm for pt to call. SW will remain available.

## 2023-11-27 ENCOUNTER — TELEPHONE (OUTPATIENT)
Dept: HEMATOLOGY/ONCOLOGY | Facility: CLINIC | Age: 53
End: 2023-11-27
Payer: MEDICAID

## 2023-11-27 NOTE — TELEPHONE ENCOUNTER
11:26 am-Pt called SW requesting appt for labs. Pt stated that she was not able to catch the lab last week. SW will send staff message to clinic staff. Pt stated that otherwise she's doing ok. Pt stated that they have the motor and oxygen censors for vehicle, just need to find someone to install. No other needs expressed. SW will remain available.    2:56 pm-pt aware of lab appt scheduled 11.30.23. Pt stated that her neighbor agreed to bring her. SW will provide a gas card. No other needs expressed. SW will remain available.

## 2023-11-30 ENCOUNTER — INFUSION (OUTPATIENT)
Dept: INFUSION THERAPY | Facility: HOSPITAL | Age: 53
End: 2023-11-30
Attending: INTERNAL MEDICINE
Payer: MEDICAID

## 2023-11-30 ENCOUNTER — DOCUMENTATION ONLY (OUTPATIENT)
Dept: HEMATOLOGY/ONCOLOGY | Facility: CLINIC | Age: 53
End: 2023-11-30
Payer: MEDICAID

## 2023-11-30 ENCOUNTER — LAB VISIT (OUTPATIENT)
Dept: LAB | Facility: HOSPITAL | Age: 53
End: 2023-11-30
Attending: INTERNAL MEDICINE
Payer: MEDICAID

## 2023-11-30 VITALS
RESPIRATION RATE: 18 BRPM | OXYGEN SATURATION: 98 % | SYSTOLIC BLOOD PRESSURE: 123 MMHG | TEMPERATURE: 98 F | HEART RATE: 81 BPM | DIASTOLIC BLOOD PRESSURE: 81 MMHG

## 2023-11-30 DIAGNOSIS — C77.3 MALIGNANT NEOPLASM METASTATIC TO LYMPH NODE OF AXILLA: ICD-10-CM

## 2023-11-30 DIAGNOSIS — C50.919 PRIMARY MALIGNANT NEOPLASM OF BREAST WITH METASTASIS TO OTHER SITE, UNSPECIFIED LATERALITY: ICD-10-CM

## 2023-11-30 DIAGNOSIS — C50.811 MALIGNANT NEOPLASM OF OVERLAPPING SITES OF RIGHT BREAST IN FEMALE, ESTROGEN RECEPTOR POSITIVE: Primary | ICD-10-CM

## 2023-11-30 DIAGNOSIS — Z17.0 MALIGNANT NEOPLASM OF OVERLAPPING SITES OF RIGHT BREAST IN FEMALE, ESTROGEN RECEPTOR POSITIVE: Primary | ICD-10-CM

## 2023-11-30 LAB
ALBUMIN SERPL BCP-MCNC: 3.9 G/DL (ref 3.5–5.2)
ALP SERPL-CCNC: 73 U/L (ref 55–135)
ALT SERPL W/O P-5'-P-CCNC: 20 U/L (ref 10–44)
ANION GAP SERPL CALC-SCNC: 10 MMOL/L (ref 8–16)
AST SERPL-CCNC: 17 U/L (ref 10–40)
BASOPHILS # BLD AUTO: 0.06 K/UL (ref 0–0.2)
BASOPHILS NFR BLD: 1 % (ref 0–1.9)
BILIRUB SERPL-MCNC: 0.4 MG/DL (ref 0.1–1)
BUN SERPL-MCNC: 14 MG/DL (ref 6–20)
CALCIUM SERPL-MCNC: 9.6 MG/DL (ref 8.7–10.5)
CHLORIDE SERPL-SCNC: 102 MMOL/L (ref 95–110)
CO2 SERPL-SCNC: 28 MMOL/L (ref 23–29)
CREAT SERPL-MCNC: 1.5 MG/DL (ref 0.5–1.4)
DIFFERENTIAL METHOD: ABNORMAL
EOSINOPHIL # BLD AUTO: 0.2 K/UL (ref 0–0.5)
EOSINOPHIL NFR BLD: 3.2 % (ref 0–8)
ERYTHROCYTE [DISTWIDTH] IN BLOOD BY AUTOMATED COUNT: 14.4 % (ref 11.5–14.5)
EST. GFR  (NO RACE VARIABLE): 41 ML/MIN/1.73 M^2
GLUCOSE SERPL-MCNC: 124 MG/DL (ref 70–110)
HCT VFR BLD AUTO: 36.7 % (ref 37–48.5)
HGB BLD-MCNC: 12.4 G/DL (ref 12–16)
IMM GRANULOCYTES # BLD AUTO: 0.08 K/UL (ref 0–0.04)
IMM GRANULOCYTES NFR BLD AUTO: 1.3 % (ref 0–0.5)
LYMPHOCYTES # BLD AUTO: 2 K/UL (ref 1–4.8)
LYMPHOCYTES NFR BLD: 31.8 % (ref 18–48)
MCH RBC QN AUTO: 35.6 PG (ref 27–31)
MCHC RBC AUTO-ENTMCNC: 33.8 G/DL (ref 32–36)
MCV RBC AUTO: 106 FL (ref 82–98)
MONOCYTES # BLD AUTO: 0.5 K/UL (ref 0.3–1)
MONOCYTES NFR BLD: 8 % (ref 4–15)
NEUTROPHILS # BLD AUTO: 3.4 K/UL (ref 1.8–7.7)
NEUTROPHILS NFR BLD: 54.7 % (ref 38–73)
NRBC BLD-RTO: 0 /100 WBC
PLATELET # BLD AUTO: 233 K/UL (ref 150–450)
PMV BLD AUTO: 11.1 FL (ref 9.2–12.9)
POTASSIUM SERPL-SCNC: 3.9 MMOL/L (ref 3.5–5.1)
PROT SERPL-MCNC: 7.1 G/DL (ref 6–8.4)
RBC # BLD AUTO: 3.48 M/UL (ref 4–5.4)
SODIUM SERPL-SCNC: 140 MMOL/L (ref 136–145)
WBC # BLD AUTO: 6.25 K/UL (ref 3.9–12.7)

## 2023-11-30 PROCEDURE — 36415 COLL VENOUS BLD VENIPUNCTURE: CPT | Performed by: INTERNAL MEDICINE

## 2023-11-30 PROCEDURE — 63600175 PHARM REV CODE 636 W HCPCS: Performed by: INTERNAL MEDICINE

## 2023-11-30 PROCEDURE — 85025 COMPLETE CBC W/AUTO DIFF WBC: CPT | Performed by: INTERNAL MEDICINE

## 2023-11-30 PROCEDURE — 25000003 PHARM REV CODE 250: Performed by: INTERNAL MEDICINE

## 2023-11-30 PROCEDURE — 80053 COMPREHEN METABOLIC PANEL: CPT | Performed by: INTERNAL MEDICINE

## 2023-11-30 PROCEDURE — A4216 STERILE WATER/SALINE, 10 ML: HCPCS | Performed by: INTERNAL MEDICINE

## 2023-11-30 PROCEDURE — 96523 IRRIG DRUG DELIVERY DEVICE: CPT

## 2023-11-30 RX ORDER — SODIUM CHLORIDE 0.9 % (FLUSH) 0.9 %
10 SYRINGE (ML) INJECTION
Status: DISCONTINUED | OUTPATIENT
Start: 2023-11-30 | End: 2023-11-30 | Stop reason: HOSPADM

## 2023-11-30 RX ORDER — HEPARIN 100 UNIT/ML
500 SYRINGE INTRAVENOUS
Status: CANCELLED | OUTPATIENT
Start: 2023-11-30

## 2023-11-30 RX ORDER — HEPARIN 100 UNIT/ML
500 SYRINGE INTRAVENOUS
Status: DISCONTINUED | OUTPATIENT
Start: 2023-11-30 | End: 2023-11-30 | Stop reason: HOSPADM

## 2023-11-30 RX ORDER — SODIUM CHLORIDE 0.9 % (FLUSH) 0.9 %
10 SYRINGE (ML) INJECTION
Status: CANCELLED | OUTPATIENT
Start: 2023-11-30

## 2023-11-30 RX ADMIN — HEPARIN 500 UNITS: 100 SYRINGE at 11:11

## 2023-11-30 RX ADMIN — SODIUM CHLORIDE, PRESERVATIVE FREE 10 ML: 5 INJECTION INTRAVENOUS at 11:11

## 2023-11-30 NOTE — DISCHARGE INSTRUCTIONS
[facility] [place]  [address] [facility]  [address]  [phone] phone     [phone] fax  Hours of Operation: Monday- Friday 8:00am- 5:00pm  After hours phone  [phone]  Hematology / Oncology Physicians on call      PRASANNA Holcomb Dr., NP Sydney Prescott, VINNY Godoy FNP    Please call with any concerns regarding your appointment today.

## 2023-11-30 NOTE — PROGRESS NOTES
SW provided pt with a $50 [employer] gas card to assist with travel expenses to appt's. No other needs expressed. SW will remain available.

## 2023-11-30 NOTE — NURSING
Patient here today for port flush. PAC accessed via sterile technique with 20g 1 in tomlin needle without difficulty.  Positive blood return noted.  Flushed per protocol NS and Heparin 500 units as documented on MAR. Tomlin needle removed and intact. Patient tolerated well. Band-Aid applied to site. Vital signs and assessment documented. Patient confirmed she will view next appt via [employer].

## 2023-12-01 ENCOUNTER — TELEPHONE (OUTPATIENT)
Dept: INFUSION THERAPY | Facility: HOSPITAL | Age: 53
End: 2023-12-01
Payer: MEDICAID

## 2023-12-14 DIAGNOSIS — F41.9 ANXIETY: ICD-10-CM

## 2023-12-14 DIAGNOSIS — C79.81 METASTATIC MALIGNANT NEOPLASM TO BREAST: ICD-10-CM

## 2023-12-15 ENCOUNTER — DOCUMENTATION ONLY (OUTPATIENT)
Dept: PALLIATIVE MEDICINE | Facility: CLINIC | Age: 53
End: 2023-12-15
Payer: MEDICAID

## 2023-12-15 RX ORDER — ALPRAZOLAM 1 MG/1
.5-1 TABLET ORAL 2 TIMES DAILY PRN
Qty: 60 TABLET | Refills: 0 | Status: SHIPPED | OUTPATIENT
Start: 2023-12-15 | End: 2024-01-11 | Stop reason: SDUPTHER

## 2023-12-15 NOTE — PROGRESS NOTES
Nurse reached out to ms garcia to advise to complete chemo and normal scans, she will need to follow up with pcp for next month's refill on xanax. Pt stated she will and I asked her if any one from the [facility] reached out to her for scheduling, she stated no, no one has called her,   she has not heard from [facility], I told her please if they call answer, because after 3 attempts they will cancel the referral and will not call her back.she stated ok, thank you I informed her I will call them back.

## 2023-12-19 ENCOUNTER — DOCUMENTATION ONLY (OUTPATIENT)
Dept: PALLIATIVE MEDICINE | Facility: CLINIC | Age: 53
End: 2023-12-19
Payer: MEDICAID

## 2023-12-19 DIAGNOSIS — C79.81 METASTATIC MALIGNANT NEOPLASM TO BREAST: Primary | ICD-10-CM

## 2023-12-19 DIAGNOSIS — G89.3 NEOPLASM RELATED PAIN: ICD-10-CM

## 2023-12-19 RX ORDER — HYDROCODONE BITARTRATE AND ACETAMINOPHEN 10; 325 MG/1; MG/1
1 TABLET ORAL EVERY 6 HOURS PRN
Qty: 120 TABLET | Refills: 0 | Status: SHIPPED | OUTPATIENT
Start: 2023-12-21 | End: 2024-01-20

## 2023-12-19 NOTE — PROGRESS NOTES
Nurse reached out to [facility] to follow up on pt's referral sent in Nov. [facility] stated they don't have pt in their system, meaning they never received her faxed information, I obtained their fax number again, [phone] and their referral form to fill out and fax back with all pt requested information. [facility] phone number is [phone]

## 2023-12-20 ENCOUNTER — DOCUMENTATION ONLY (OUTPATIENT)
Dept: PALLIATIVE MEDICINE | Facility: CLINIC | Age: 53
End: 2023-12-20
Payer: MEDICAID

## 2023-12-20 NOTE — PROGRESS NOTES
HEMATOLOGY / ONCOLOGY   CLINIC NOTE     ONCOLOGICAL HISTORY:     Diagnosis:  - Stage IV cervical squamous cell carcinoma  - Stage IV, T2 N1b M1, invasive breast carcinoma, ER/AL+, HER2-  - Papillary thyroid carcinoma status post total thyroidectomy on 08/31/2017, mpT1b N0   - Cervical HSIL [place]RE[place]LE 3 s/p LEEP, 2017    Treatment History:  - Palliative chemotherapy: cisplatin, paclitaxel and bevacizumab; 02/22/2021 cycle 1 day 1 -> bevacizumab maintenance after 6 cycles  - Carboplatin, paclitaxel and pembrolizumab, C1D1 1/28/22; maintenance pembrolizumab, d/c 9/2022    Current Treatment:   - Letrozole and palbociclib     Subjective:       Chief Complaint: Breast Cancer, Chemotherapy, and Follow-up      HPI    Josey Flores  53 y.o.  female with past medical history significant for CVA, thyroid cancer, cervical cancer and breast cancer here for follow-up    Interval History:     Patient is here for follow-up.  She is compliant with her medications and denies any issues.  Denies any nausea, vomiting, diarrhea, numbness, chest pain, shortness of breath, palpitations or any other concerns.  Denies any abdominal pain or any other pains.    Oncology History   Secondary cancer of bone    Chemotherapy    Treatment Summary   Plan Name: OP GYN PACLITAXEL CISPLATIN BEVACIZUMAB Q3W  Treatment Goal: Control  Status: Inactive  Start Date: 2/22/2021  End Date: 7/7/2021  Provider: Estefani Asencio MD  Chemotherapy: CISplatin (PLATINOL) 50 mg/m2 = 108 mg in sodium chloride 0.9% 608 mL chemo infusion, 50 mg/m2 = 108 mg, Intravenous, Clinic/HOD 1 time, 6 of 6 cycles  Administration: 108 mg (2/22/2021), 108 mg (3/15/2021), 108 mg (4/6/2021), 108 mg (5/11/2021), 108 mg (6/1/2021), 108 mg (6/22/2021)    bevacizumab (AVASTIN) 15 mg/kg = 1,480 mg in sodium chloride 0.9% 100 mL chemo infusion, 15 mg/kg = 1,480 mg, Intravenous, Clinic/HOD 1 time, 6 of 6 cycles  Administration: 1,480 mg (4/6/2021), 1,480 mg (2/23/2021), 1,480 mg  (3/16/2021), 1,480 mg (6/1/2021), 1,480 mg (6/22/2021), 1,480 mg (5/12/2021)    PACLitaxeL (TAXOL) 175 mg/m2 = 378 mg in sodium chloride 0.9% 563 mL chemo infusion, 175 mg/m2 = 378 mg, Intravenous, Clinic/HOD 1 time, 6 of 6 cycles  Administration: 378 mg (2/22/2021), 378 mg (3/15/2021), 378 mg (4/6/2021), 378 mg (5/11/2021), 378 mg (6/1/2021), 378 mg (6/22/2021)    Plan Name: OP BEVACIZUMAB Q3W   Treatment Goal: Maintenance  Status: Inactive  Start Date: 7/13/2021  End Date: 12/28/2021  Provider: Estefani Asencio MD  Chemotherapy: bevacizumab (AVASTIN) 1,300 mg in sodium chloride 0.9% 100 mL chemo infusion, 1,345 mg, Intravenous, Clinic/HOD 1 time, 9 of 17 cycles  Administration: 1,300 mg (7/13/2021), 1,345 mg (8/3/2021), 1,300 mg (8/24/2021), 1,300 mg (9/14/2021), 1,345 mg (10/5/2021), 1,345 mg (10/26/2021), 1,345 mg (11/16/2021), 1,300 mg (12/7/2021), 1,300 mg (12/28/2021)    Plan Name: OP CARBOPLATIN PACLITAXEL PEMBROLIZUMAB Q3W FOLLOWED BY MAINTENANCE PEMBROLIZUMAB 400 MG Q6W  Treatment Goal: Control  Status: Inactive  Start Date: 1/28/2022  End Date: 8/11/2022  Provider: Estefani Asencio MD  Chemotherapy: CARBOplatin (PARAPLATIN) 425 mg in sodium chloride 0.9% 292.5 mL chemo infusion, 425 mg, Intravenous, Clinic/HOD 1 time, 6 of 6 cycles  Dose modification:   (original dose 424 mg, Cycle 6),   (original dose 424 mg, Cycle 6)  Administration: 425 mg (1/28/2022), 495 mg (2/18/2022), 525 mg (6/2/2022), 525 mg (5/12/2022), 525 mg (3/11/2022), 495 mg (4/11/2022)    PACLitaxeL (TAXOL) 175 mg/m2 = 366 mg in sodium chloride 0.9% 561 mL chemo infusion, 175 mg/m2 = 366 mg (100 % of original dose 175 mg/m2), Intravenous, [facility] [place] 1 time, 6 of 6 cycles  Dose modification: 175 mg/m2 (original dose 175 mg/m2, Cycle 1), 175 mg/m2 (original dose 175 mg/m2, Cycle 4)  Administration: 366 mg (1/28/2022), 366 mg (2/18/2022), 366 mg (3/11/2022), 366 mg (4/11/2022), 366 mg (5/12/2022), 366 mg (6/2/2022)       Malignant  neoplasm of endocervix   9/10/2020 Imaging Significant Findings    CT A/P: Negative for ascites, omental caking, lymphadenopathy, or a gross cervical mass     12/7/2020 Biopsy    EMBX: SCC, p16+     1/12/2021 Imaging Significant Findings    MRI Pelvis w/ w/o  4.9 cm cervical mass  R parametrial invasion  R external iliac lymphadenopathy     1/27/2021 Imaging Significant Findings    PET/CT: FDG avidity in the chest     2/18/2021 Biopsy    Endoscopic biopsy of the lung: +      2/22/2021 - 6/29/2021 Chemotherapy    Cisplatin/paclitaxel/avastin x6     3/12/2021 Genetic Testing    STRATA: FRANK, PIK3c, Ep300, ERBB3, KDM6A     4/24/2021 Imaging Significant Findings    CT Pelvis w/: JUAN FRANCISCO with a normal cervix     4/26/2021 Imaging Significant Findings    PET/CT: No FDG avidity     5/18/2021 Imaging Significant Findings    CT A/P w/: JUAN FRANCISCO     6/5/2021 Imaging Significant Findings    CT A/P w/: JUAN FRANCISCO     7/12/2021 Imaging Significant Findings    PET/CT: JUAN FRANCISCO     7/13/2021 - 12/28/2021 Maintenance Therapy    Avastin x 9     10/20/2021 Imaging Significant Findings    PET/CT: JUAN FRANCISCO     1/11/2022 Imaging Significant Findings    PET/CT: Increased FDG avidity in the cervix.  No other evidence of disease.     1/28/2022 - 6/2/2022 Chemotherapy    Carboplatin/paclitaxel/pembrolizumab x 6     3/25/2022 Imaging Significant Findings    PET/CT: Persistent FDG avidity in the cervix.  New FDG avidity in the inguinal lymph nodes (although it does not look suspicious)     4/25/2022 - 4/29/2022 Radiation Therapy    Treating physician: Dr. Vázquez (MRI guided T&O)  Total Dose: 30 Gy  Fractions: 5     6/30/2022 - 8/11/2022 Maintenance Therapy    Pembrolizumab 400 mg IV x2     8/5/2022 Imaging Significant Findings    PET/CT: JUAN FRANCISCO     10/16/2022 Imaging Significant Findings    CT A/P w/ (Women's without imaging available): JUAN FRANCISCO     1/11/2023 Imaging Significant Findings    PET/CT: JUAN FRANCISCO      Chemotherapy    Treatment Summary   Plan Name: OP GYN PACLITAXEL  CISPLATIN BEVACIZUMAB Q3W  Treatment Goal: Control  Status: Inactive  Start Date: 2/22/2021  End Date: 7/7/2021  Provider: Estefani Asencio MD  Chemotherapy: CISplatin (PLATINOL) 50 mg/m2 = 108 mg in sodium chloride 0.9% 608 mL chemo infusion, 50 mg/m2 = 108 mg, Intravenous, Clinic/HOD 1 time, 6 of 6 cycles  Administration: 108 mg (2/22/2021), 108 mg (3/15/2021), 108 mg (4/6/2021), 108 mg (5/11/2021), 108 mg (6/1/2021), 108 mg (6/22/2021)    bevacizumab (AVASTIN) 15 mg/kg = 1,480 mg in sodium chloride 0.9% 100 mL chemo infusion, 15 mg/kg = 1,480 mg, Intravenous, Clinic/HOD 1 time, 6 of 6 cycles  Administration: 1,480 mg (4/6/2021), 1,480 mg (2/23/2021), 1,480 mg (3/16/2021), 1,480 mg (6/1/2021), 1,480 mg (6/22/2021), 1,480 mg (5/12/2021)    PACLitaxeL (TAXOL) 175 mg/m2 = 378 mg in sodium chloride 0.9% 563 mL chemo infusion, 175 mg/m2 = 378 mg, Intravenous, Clinic/HOD 1 time, 6 of 6 cycles  Administration: 378 mg (2/22/2021), 378 mg (3/15/2021), 378 mg (4/6/2021), 378 mg (5/11/2021), 378 mg (6/1/2021), 378 mg (6/22/2021)    Plan Name: OP BEVACIZUMAB Q3W   Treatment Goal: Maintenance  Status: Inactive  Start Date: 7/13/2021  End Date: 12/28/2021  Provider: Estefani Asencio MD  Chemotherapy: bevacizumab (AVASTIN) 1,300 mg in sodium chloride 0.9% 100 mL chemo infusion, 1,345 mg, Intravenous, Clinic/HOD 1 time, 9 of 17 cycles  Administration: 1,300 mg (7/13/2021), 1,345 mg (8/3/2021), 1,300 mg (8/24/2021), 1,300 mg (9/14/2021), 1,345 mg (10/5/2021), 1,345 mg (10/26/2021), 1,345 mg (11/16/2021), 1,300 mg (12/7/2021), 1,300 mg (12/28/2021)    Plan Name: OP CARBOPLATIN PACLITAXEL PEMBROLIZUMAB Q3W FOLLOWED BY MAINTENANCE PEMBROLIZUMAB 400 MG Q6W  Treatment Goal: Control  Status: Inactive  Start Date: 1/28/2022  End Date: 8/11/2022  Provider: Estefani Asencio MD  Chemotherapy: CARBOplatin (PARAPLATIN) 425 mg in sodium chloride 0.9% 292.5 mL chemo infusion, 425 mg, Intravenous, Clinic/HOD 1 time, 6 of 6 cycles  Dose  modification:   (original dose 424 mg, Cycle 6),   (original dose 424 mg, Cycle 6)  Administration: 425 mg (1/28/2022), 495 mg (2/18/2022), 525 mg (6/2/2022), 525 mg (5/12/2022), 525 mg (3/11/2022), 495 mg (4/11/2022)    PACLitaxeL (TAXOL) 175 mg/m2 = 366 mg in sodium chloride 0.9% 561 mL chemo infusion, 175 mg/m2 = 366 mg (100 % of original dose 175 mg/m2), Intravenous, Clinic/HOD 1 time, 6 of 6 cycles  Dose modification: 175 mg/m2 (original dose 175 mg/m2, Cycle 1), 175 mg/m2 (original dose 175 mg/m2, Cycle 4)  Administration: 366 mg (1/28/2022), 366 mg (2/18/2022), 366 mg (3/11/2022), 366 mg (4/11/2022), 366 mg (5/12/2022), 366 mg (6/2/2022)           Past Medical History:   Diagnosis Date    Anxiety     Asthma     Breast cancer 2017    Cancer     right breast, metastatic-lymph nodes, bone, and thyroid     COPD (chronic obstructive pulmonary disease)     Depression     History of psychiatric hospitalization     2000; suicidal ideation    Hx of psychiatric care     Hypothyroidism     Miscarriage     five miscarriages    Obesity     Psychiatric problem     Thyroid cancer 2017      Past Surgical History:   Procedure Laterality Date    ABSCESS DRAINAGE      bilateral axilla    ADENOIDECTOMY      APPENDECTOMY      BREAST BIOPSY Right     x3    CHOLECYSTECTOMY      ENDOBRONCHIAL ULTRASOUND Bilateral 02/18/2021    Procedure: ENDOBRONCHIAL ULTRASOUND (EBUS);  Surgeon: Jaron Ni MD;  Location: [facility] ENDO;  Service: Pulmonary;  Laterality: Bilateral;    FLUOROSCOPY N/A 01/28/2021    Procedure: Mediport placement;  Surgeon: Noah Phelan MD;  Location: [facility] CATH LAB;  Service: General;  Laterality: N/A;    MASS EXCISION      MEDIPORT INSERTION, SINGLE Right 02/2021    SKIN GRAFT  06/16/2017    THYROIDECTOMY Bilateral     TONSILLECTOMY       Social History     Socioeconomic History    Marital status:     Number of children: 2   Tobacco Use    Smoking status: Every Day     Current packs/day: 1.00     Average  packs/day: 1 pack/day for 39.0 years (39.0 ttl pk-yrs)     Types: Cigarettes     Start date: 1/1/1985    Smokeless tobacco: Never    Tobacco comments:     45 pack year history   Substance and Sexual Activity    Alcohol use: No    Drug use: No    Sexual activity: Not Currently     Partners: Male     Birth control/protection: None   Social History Narrative     with two adult children; common law marriage (10+years); raised [place], no current Pentec[employer]alism practice     Social Determinants of Health     Financial Resource Strain: Medium Risk (11/27/2023)    Overall Financial Resource Strain (CARDIA)     Difficulty of Paying Living Expenses: Somewhat hard   Food Insecurity: No Food Insecurity (11/27/2023)    Hunger Vital Sign     Worried About Running Out of Food in the Last Year: Never true     Ran Out of Food in the Last Year: Never true   Transportation Needs: Unmet Transportation Needs (11/27/2023)    PRAPARE - Transportation     Lack of Transportation (Medical): Yes     Lack of Transportation (Non-Medical): Yes   Physical Activity: Insufficiently Active (11/27/2023)    Exercise Vital Sign     Days of Exercise per Week: 2 days     Minutes of Exercise per Session: 10 min   Stress: Stress Concern Present (11/27/2023)    [employer] - Occupational Stress Questionnaire     Feeling of Stress : Very much   Social Connections: Unknown (11/27/2023)    Social Connection and Isolation Panel [NHANES]     Frequency of Communication with Friends and Family: More than three times a week     Frequency of Social Gatherings with Friends and Family: Once a week     Active Member of Clubs or Organizations: No     Attends Club or Organization Meetings: Never     Marital Status: Living with partner   Housing Stability: Unknown (11/27/2023)    Housing Stability Vital Sign     Unable to Pay for Housing in the Last Year: No     Unstable Housing in the Last Year: No      Family History   Problem Relation  Age of Onset    Arthritis Mother     Early death Mother         64 at time of death    Heart attack Mother     Heart disease Mother     Heart failure Mother     Hypertension Mother     Coronary artery disease Father     Hearing loss Father     Heart disease Father     Hyperlipidemia Father     Hypertension Father     Mental illness Father     Learning disabilities Son     Cancer Maternal Aunt           Review of patient's allergies indicates:   Allergen Reactions    Gabapentin Swelling     Note: unilateral joint edema, unclear if due to gabapentin    Nsaids (non-steroidal anti-inflammatory drug) Other (See Comments)     Instructed not to take NSAID drugs with chemo pill.    Zometa [zoledronic acid] Other (See Comments)     Possible osteonecrosis jaw    Clindamycin Rash    Vancomycin analogues Itching      Review of Systems   Constitutional: Negative.  Negative for activity change, chills, fatigue and fever.   HENT: Negative.     Eyes: Negative.    Respiratory:  Negative for cough and shortness of breath.    Cardiovascular:  Negative for chest pain and leg swelling.   Gastrointestinal:  Negative for constipation, diarrhea, nausea and vomiting.   Endocrine: Negative.    Genitourinary: Negative.    Musculoskeletal:  Negative for arthralgias and myalgias.   Integumentary:  Negative.   Allergic/Immunologic: Negative.    Neurological:  Negative for light-headedness, numbness and headaches.   Hematological: Negative.    Psychiatric/Behavioral: Negative.           Objective:        Vitals:    12/21/23 1325   BP: 133/89   Pulse: 102   Temp: 97.8 °F (36.6 °C)          Physical Exam  Constitutional:       General: She is not in acute distress.     Appearance: She is well-developed. She is not ill-appearing.   HENT:      Head: Normocephalic and atraumatic.      Mouth/Throat:      Mouth: Mucous membranes are moist.   Eyes:      Extraocular Movements: Extraocular movements intact.      Conjunctiva/sclera: Conjunctivae normal.    Cardiovascular:      Rate and Rhythm: Normal rate and regular rhythm.      Heart sounds: Normal heart sounds.   Pulmonary:      Effort: Pulmonary effort is normal. No respiratory distress.      Breath sounds: Normal breath sounds. No wheezing.   Abdominal:      General: There is no distension.      Palpations: Abdomen is soft.      Tenderness: There is no abdominal tenderness.   Musculoskeletal:         General: Normal range of motion.      Cervical back: Normal range of motion and neck supple.   Skin:     General: Skin is warm.   Neurological:      Mental Status: She is alert and oriented to person, place, and time. Mental status is at baseline.   Psychiatric:         Mood and Affect: Mood normal.           LABS / IMAGING      - 10/31/2023 CT CAP  1. No definite evidence for metastatic disease in the chest, abdomen, or pelvis on this unenhanced exam.  2. Stable subpleural 7 mm right upper lobe pulmonary nodule which showed no FDG avidity on the prior PET-CT.  3. Evidence of prior granulomatous disease in the chest and spleen.                                       Assessment:         Metastatic breast cancer ER positive HER2 negative:    - Had been on systemic therapy with palbociclib and letrozole.       Bony metastatic disease:    - Prior bisphosphonate use discontinued due to osteonecrosis jaw following with ENT.    Metastatic Cervical squamous cell carcinoma   - history of HSIL Status post LEEP 2017.   - In December 2020 follow-up with gynecology with new spotty vaginal bleeding/discharge with biopsy 12/7/20 showing squamous cell carcinoma consistent with cervical primary. MRI pelvis performed showing locally advanced disease with 4.9 cm cervical mass with right parametrial involvement and enlarged right external iliac lymph node 1.5 x 1.2 cm. PET-CT showing avidity of cervical mass with extension to lower uterine segment, right vaginal focus of avidity and lymphadenopathy including right internal external  iliac, periaortic, pericaval. There is the new hypermetabolic lymphadenopathy in right subcarinal 2.3 cm SUV 5.8. Small 9 mm right paratracheal S base slightly increased no FDG avid SUV 2.4.    - 01/29/2021 multidisciplinary tumor board discussion of her case with review of imaging; concern for new avid lymphadenopathy particularly in right subcarinal may be most consistent with metastatic cervical squamous cell carcinoma; tumor board recommendation for biopsy to confirm for prognostic information as well as given other concomitant malignancy to exclude alternative histology, although felt less likely metastatic breast given this is been well controlled and primarily bony disease involvement. We discussed recommendation for systemic chemotherapy given advanced cervical squamous cell carcinoma with cisplatin, paclitaxel and bevacizumab with discontinuation of palbociclib but can continue aromatase inhibitor; taxane may also be active for her concomitant metastatic breast cancer.   - EBUS with biopsy 2/18/21, station 7 consistent with metastatic squamous cell carcinoma of cervical origin.  - STRATA requested on cervical SCC, ERBB3 <5% felt to be subclonal per report, PIK3CA amplification, CE3193, KDM6A, FRANK, TMB low, PDL1 high may indicated sensitivity to check point inhibitor in future regimen.  - audiology exam 2/18/21, recommended that she let us know if any perceived change in her hearing throughout treatment and recommend repeat testing throughout to ensure no ototoxicity.  - [facility] germline genetic testing returned negative for mutation, provided to patient.  - cycle 1 day 1 cisplatin paclitaxel and bevacizumab on 02/22/2021  - Repeat scans 04/26/2021 showed excellent improvement in thoracic and pelvic lymphadenopathy and primary cervical mass.  She is asymptomatic from this no further bleeding.  Recommended continuation of her current regimen which she is tolerating well with supportive care, IV fluids 3 times  weekly per patient preference.  - restaging after cycle 6 she has continued improvement in metastatic cervical squamous cell carcinoma; avidity in left vulvar area diminished associated with known infection.  She has required substantial supportive care on chemotherapy and recommend given sustained improvement on scans continuing and absence of chemotherapy with bevacizumab alone and continue close surveillance.  She would like to continue IV fluids weekly as needed.     We reviewed in detail restaging PET-CT January 2022 notable for uptake in cervical region concerning for oligo progression/recurrence of her disease.  Previously biopsy proven metastatic cervical cancer in lungs without evidence of recurrent mass/lymphadenopathy/avidity in this region or otherwise.     Given prior PDL1 high disease CPS >1 we decided to proceed with chemoimmunotherapy per Keynote 826, carboplatin, paclitaxel and pembrolizumab.   She completed 6 cycles of carboplatin paclitaxel with pembrolizumab (1 cycle without pembrolizumab from unclear reasons with my colleague) with interval brachytherapy with Dr. Vázquez at [facility].     Restaging PET scan with diminished avidity vaginal cuff status post brachytherapy.  No new areas concerning for active disease.  We discussed consideration of holding further maintenance immunotherapy at this time given no evidence of recurrent / progressive metastatic disease and is amenable to this.  Will continue her breast cancer treatment per below.     5/2023 last restaging PET without evidence of recurrent progressive metastatic disease but there were changes in lung for which we obtain short interval follow-up CT chest with resolution left lower lobe abnormalities most likely infectious inflammatory.  Unfortunately she is had interval CVA now on Plavix undergoing physical therapy recommend follow-up with this and Neurology and tobacco cessation.  Restaging scans in October 2023 if  otherwise clinically stable from metastatic disease standpoint.  She is continued on letrozole and Ibrance.  Interval skin groin infection status post Bactrim antibiotic course completed.  Will repeat labs if within acceptable limits okay to restart Ibrance.    history of papillary thyroid carcinoma status post total thyroidectomy on 08/31/2017:   s/p total thyroidectomy on levothyroxine.      Hypothyroidism:   - levothyroxine 275 mcg daily.  Repeat thyroid function labs for monitoring.     Macrocytosis:   - Possibly related to thyroid disorder will continue to monitor.  She is on vitamin-B supplement.  No new anemia.     Neuropathy:    - Mild, will monitor      tobacco abuse:    - Currently working on tobacco cessation after CVA June 2023 on nicotine replacement, congratulated on effort and continued cessation encouraged      Plan:     - labs reviewed and continue with current management with palbociclib and letrozole  - discussed the importance of regular follow-up to monitor for toxicity from the medications  - started with cyanocobalamin subcutaneous and also on folic acid for low-level noted in September 2023  - repeat imaging in 3 months, ordered for next visit  - MD / LABS VISIT - 6 WEEKS         Med Onc Chart Routing      Follow up with physician 6 weeks.   Follow up with MIGUEL    Infusion scheduling note   port flush   Injection scheduling note    Labs CBC, CMP, folate and vitamin B12   Scheduling:  Preferred lab:  Lab interval:  labs before next visit   Imaging CT chest abdomen pelvis   after 4 weeks   Pharmacy appointment    Other referrals                    The patient was seen, interviewed and examined. Pertinent lab and radiology studies were reviewed. Pt instructed to call should develop concerning signs/symptoms or have further questions.       Portions of the record may have been created with voice recognition software. Occasional wrong-word or sound-a-like substitutions may have occurred due to  the inherent limitations of voice recognition software. Read the chart carefully and recognize, using context, where substitutions have occurred.    Isai Cody MD  Hematology / Oncology

## 2023-12-21 ENCOUNTER — OFFICE VISIT (OUTPATIENT)
Dept: HEMATOLOGY/ONCOLOGY | Facility: CLINIC | Age: 53
End: 2023-12-21
Payer: MEDICAID

## 2023-12-21 ENCOUNTER — DOCUMENTATION ONLY (OUTPATIENT)
Dept: PALLIATIVE MEDICINE | Facility: CLINIC | Age: 53
End: 2023-12-21
Payer: MEDICAID

## 2023-12-21 ENCOUNTER — LAB VISIT (OUTPATIENT)
Dept: LAB | Facility: HOSPITAL | Age: 53
End: 2023-12-21
Attending: INTERNAL MEDICINE
Payer: MEDICAID

## 2023-12-21 ENCOUNTER — PATIENT MESSAGE (OUTPATIENT)
Dept: PALLIATIVE MEDICINE | Facility: CLINIC | Age: 53
End: 2023-12-21
Payer: MEDICAID

## 2023-12-21 VITALS
SYSTOLIC BLOOD PRESSURE: 133 MMHG | WEIGHT: 190.5 LBS | BODY MASS INDEX: 29.9 KG/M2 | HEART RATE: 102 BPM | HEIGHT: 67 IN | OXYGEN SATURATION: 97 % | TEMPERATURE: 98 F | DIASTOLIC BLOOD PRESSURE: 89 MMHG

## 2023-12-21 DIAGNOSIS — C77.3 MALIGNANT NEOPLASM METASTATIC TO LYMPH NODE OF AXILLA: ICD-10-CM

## 2023-12-21 DIAGNOSIS — C77.3 MALIGNANT NEOPLASM METASTATIC TO LYMPH NODE OF AXILLA: Primary | ICD-10-CM

## 2023-12-21 LAB
ALBUMIN SERPL BCP-MCNC: 4 G/DL (ref 3.5–5.2)
ALP SERPL-CCNC: 76 U/L (ref 55–135)
ALT SERPL W/O P-5'-P-CCNC: 26 U/L (ref 10–44)
ANION GAP SERPL CALC-SCNC: 13 MMOL/L (ref 8–16)
AST SERPL-CCNC: 20 U/L (ref 10–40)
BASOPHILS # BLD AUTO: 0.06 K/UL (ref 0–0.2)
BASOPHILS NFR BLD: 0.8 % (ref 0–1.9)
BILIRUB SERPL-MCNC: 0.5 MG/DL (ref 0.1–1)
BUN SERPL-MCNC: 15 MG/DL (ref 6–20)
CALCIUM SERPL-MCNC: 9.9 MG/DL (ref 8.7–10.5)
CHLORIDE SERPL-SCNC: 103 MMOL/L (ref 95–110)
CO2 SERPL-SCNC: 26 MMOL/L (ref 23–29)
CREAT SERPL-MCNC: 1.4 MG/DL (ref 0.5–1.4)
DIFFERENTIAL METHOD: ABNORMAL
EOSINOPHIL # BLD AUTO: 0.2 K/UL (ref 0–0.5)
EOSINOPHIL NFR BLD: 3.1 % (ref 0–8)
ERYTHROCYTE [DISTWIDTH] IN BLOOD BY AUTOMATED COUNT: 13.9 % (ref 11.5–14.5)
EST. GFR  (NO RACE VARIABLE): 44 ML/MIN/1.73 M^2
GLUCOSE SERPL-MCNC: 116 MG/DL (ref 70–110)
HCT VFR BLD AUTO: 39.2 % (ref 37–48.5)
HGB BLD-MCNC: 13.4 G/DL (ref 12–16)
IMM GRANULOCYTES # BLD AUTO: 0.06 K/UL (ref 0–0.04)
IMM GRANULOCYTES NFR BLD AUTO: 0.8 % (ref 0–0.5)
LYMPHOCYTES # BLD AUTO: 2.1 K/UL (ref 1–4.8)
LYMPHOCYTES NFR BLD: 26.9 % (ref 18–48)
MCH RBC QN AUTO: 35.8 PG (ref 27–31)
MCHC RBC AUTO-ENTMCNC: 34.2 G/DL (ref 32–36)
MCV RBC AUTO: 105 FL (ref 82–98)
MONOCYTES # BLD AUTO: 0.4 K/UL (ref 0.3–1)
MONOCYTES NFR BLD: 5.2 % (ref 4–15)
NEUTROPHILS # BLD AUTO: 5 K/UL (ref 1.8–7.7)
NEUTROPHILS NFR BLD: 63.2 % (ref 38–73)
NRBC BLD-RTO: 0 /100 WBC
PLATELET # BLD AUTO: 231 K/UL (ref 150–450)
PMV BLD AUTO: 11.6 FL (ref 9.2–12.9)
POTASSIUM SERPL-SCNC: 4.4 MMOL/L (ref 3.5–5.1)
PROT SERPL-MCNC: 7.5 G/DL (ref 6–8.4)
RBC # BLD AUTO: 3.74 M/UL (ref 4–5.4)
SODIUM SERPL-SCNC: 142 MMOL/L (ref 136–145)
WBC # BLD AUTO: 7.83 K/UL (ref 3.9–12.7)

## 2023-12-21 PROCEDURE — 99999 PR PBB SHADOW E&M-EST. PATIENT-LVL III: ICD-10-PCS | Mod: PBBFAC,,, | Performed by: INTERNAL MEDICINE

## 2023-12-21 PROCEDURE — 99214 PR OFFICE/OUTPT VISIT, EST, LEVL IV, 30-39 MIN: ICD-10-PCS | Mod: S$PBB,,, | Performed by: INTERNAL MEDICINE

## 2023-12-21 PROCEDURE — 3075F PR MOST RECENT SYSTOLIC BLOOD PRESS GE 130-139MM HG: ICD-10-PCS | Mod: CPTII,,, | Performed by: INTERNAL MEDICINE

## 2023-12-21 PROCEDURE — 85025 COMPLETE CBC W/AUTO DIFF WBC: CPT | Performed by: INTERNAL MEDICINE

## 2023-12-21 PROCEDURE — 3075F SYST BP GE 130 - 139MM HG: CPT | Mod: CPTII,,, | Performed by: INTERNAL MEDICINE

## 2023-12-21 PROCEDURE — 3008F PR BODY MASS INDEX (BMI) DOCUMENTED: ICD-10-PCS | Mod: CPTII,,, | Performed by: INTERNAL MEDICINE

## 2023-12-21 PROCEDURE — 80053 COMPREHEN METABOLIC PANEL: CPT | Performed by: INTERNAL MEDICINE

## 2023-12-21 PROCEDURE — 99213 OFFICE O/P EST LOW 20 MIN: CPT | Mod: PBBFAC | Performed by: INTERNAL MEDICINE

## 2023-12-21 PROCEDURE — 1159F MED LIST DOCD IN RCRD: CPT | Mod: CPTII,,, | Performed by: INTERNAL MEDICINE

## 2023-12-21 PROCEDURE — 3044F HG A1C LEVEL LT 7.0%: CPT | Mod: CPTII,,, | Performed by: INTERNAL MEDICINE

## 2023-12-21 PROCEDURE — 1160F PR REVIEW ALL MEDS BY PRESCRIBER/CLIN PHARMACIST DOCUMENTED: ICD-10-PCS | Mod: CPTII,,, | Performed by: INTERNAL MEDICINE

## 2023-12-21 PROCEDURE — 1159F PR MEDICATION LIST DOCUMENTED IN MEDICAL RECORD: ICD-10-PCS | Mod: CPTII,,, | Performed by: INTERNAL MEDICINE

## 2023-12-21 PROCEDURE — 99214 OFFICE O/P EST MOD 30 MIN: CPT | Mod: S$PBB,,, | Performed by: INTERNAL MEDICINE

## 2023-12-21 PROCEDURE — 3044F PR MOST RECENT HEMOGLOBIN A1C LEVEL <7.0%: ICD-10-PCS | Mod: CPTII,,, | Performed by: INTERNAL MEDICINE

## 2023-12-21 PROCEDURE — 99999 PR PBB SHADOW E&M-EST. PATIENT-LVL III: CPT | Mod: PBBFAC,,, | Performed by: INTERNAL MEDICINE

## 2023-12-21 PROCEDURE — 1160F RVW MEDS BY RX/DR IN RCRD: CPT | Mod: CPTII,,, | Performed by: INTERNAL MEDICINE

## 2023-12-21 PROCEDURE — 3079F PR MOST RECENT DIASTOLIC BLOOD PRESSURE 80-89 MM HG: ICD-10-PCS | Mod: CPTII,,, | Performed by: INTERNAL MEDICINE

## 2023-12-21 PROCEDURE — 3079F DIAST BP 80-89 MM HG: CPT | Mod: CPTII,,, | Performed by: INTERNAL MEDICINE

## 2023-12-21 PROCEDURE — 36415 COLL VENOUS BLD VENIPUNCTURE: CPT | Performed by: INTERNAL MEDICINE

## 2023-12-21 PROCEDURE — 3008F BODY MASS INDEX DOCD: CPT | Mod: CPTII,,, | Performed by: INTERNAL MEDICINE

## 2023-12-21 NOTE — PROGRESS NOTES
Nurse has filled out online request for an apt with Dr Price in [place], however when the office call to schedule apt with pt, she can inform them which location works best for her. Unable to reach any one from Dr Price's office for fax number.

## 2023-12-22 ENCOUNTER — PATIENT MESSAGE (OUTPATIENT)
Dept: ADMINISTRATIVE | Facility: OTHER | Age: 53
End: 2023-12-22
Payer: MEDICAID

## 2023-12-26 NOTE — TELEPHONE ENCOUNTER
Higher bp - change losartan to olmesartan   Rx provided    Returned pt's call. Left message regarding where and when [facility]

## 2024-01-04 ENCOUNTER — OFFICE VISIT (OUTPATIENT)
Dept: OTOLARYNGOLOGY | Facility: CLINIC | Age: 54
End: 2024-01-04
Payer: MEDICAID

## 2024-01-04 DIAGNOSIS — Z72.0 TOBACCO ABUSE: ICD-10-CM

## 2024-01-04 DIAGNOSIS — I96: Primary | ICD-10-CM

## 2024-01-04 DIAGNOSIS — H60.42 CHOLESTEATOMA OF EXTERNAL AUDITORY CANAL, LEFT: ICD-10-CM

## 2024-01-04 PROCEDURE — 69220 CLEAN OUT MASTOID CAVITY: CPT | Mod: S$PBB,50,, | Performed by: OTOLARYNGOLOGY

## 2024-01-04 PROCEDURE — 1159F MED LIST DOCD IN RCRD: CPT | Mod: CPTII,,, | Performed by: OTOLARYNGOLOGY

## 2024-01-04 PROCEDURE — 99213 OFFICE O/P EST LOW 20 MIN: CPT | Mod: 25,PBBFAC | Performed by: OTOLARYNGOLOGY

## 2024-01-04 PROCEDURE — 69220 CLEAN OUT MASTOID CAVITY: CPT | Mod: 50,PBBFAC | Performed by: OTOLARYNGOLOGY

## 2024-01-04 PROCEDURE — 99213 OFFICE O/P EST LOW 20 MIN: CPT | Mod: 25,S$PBB,, | Performed by: OTOLARYNGOLOGY

## 2024-01-04 PROCEDURE — 99999 PR PBB SHADOW E&M-EST. PATIENT-LVL III: CPT | Mod: PBBFAC,,, | Performed by: OTOLARYNGOLOGY

## 2024-01-04 RX ORDER — MENTHOL
1000 GEL (GRAM) TOPICAL DAILY
Qty: 30 CAPSULE | Refills: 11 | Status: SHIPPED | OUTPATIENT
Start: 2024-01-04

## 2024-01-08 RX ORDER — PENTOXIFYLLINE 400 MG/1
400 TABLET, EXTENDED RELEASE ORAL 2 TIMES DAILY WITH MEALS
Qty: 60 TABLET | Refills: 11 | Status: SHIPPED | OUTPATIENT
Start: 2024-01-08 | End: 2025-01-07

## 2024-01-08 NOTE — PROGRESS NOTES
Subjective:       Patient ID: Josey Flores is a 53 y.o. female.    Chief Complaint: left ear pain     Follow-up  Associated symptoms include coughing.   Ear Drainage   Associated symptoms include coughing and ear discharge.      1/4/2024: w hx of BRCA s/p chemotherapy.  She has a history of osteonecrosis of the the left EAC and slight on right side , thought to be as a result bisphosphonate use, she also smokes.     Here today for check up. Reports left ear has been hurting recently. Denies otorrhea or change in hearing.       Review of Systems   Constitutional: Negative.    HENT:  Positive for ear discharge, ear pain and facial swelling.    Eyes: Negative.    Respiratory:  Positive for cough and wheezing.    Cardiovascular: Negative.    Gastrointestinal: Negative.    Endocrine: Positive for cold intolerance and heat intolerance.   Genitourinary: Negative.    Musculoskeletal:  Positive for back pain.   Integumentary:  Negative.   Allergic/Immunologic: Negative.    Neurological: Negative.    Hematological: Negative.    Psychiatric/Behavioral:  Positive for decreased concentration and sleep disturbance. The patient is nervous/anxious.          Objective:      Physical Exam  Constitutional:       Appearance: Normal appearance.   HENT:      Head: Atraumatic.      Right Ear: External ear normal.      Left Ear: External ear normal.   Neurological:      Mental Status: She is alert.            inocular Microscopy with  debridement of mastoid and ear canal  Details: binocular microscopy used to examine both ears  Findings  AD: erosion of inferior EAC with cerumen impaction, this was removed with microinstrumentation.exposure of bone of inferior EAC  AS: inferior and posterior Erosion of EAC with exposure of mastoid air cells.  Eroded cavity filled with keratinaceous moist debris. No rosa purulence, no change from previous examinations.  Applied layer of topical cipro/clotrimazole/boric acid and dexamethasone powder  to cavity.       Assessment:       Problem List Items Addressed This Visit    None  Visit Diagnoses       Osteonecrosis of external ear canal, left    -  Primary    Cholesteatoma of external auditory canal, left        Tobacco abuse                  Plan:         In summary this is a pleasant 53 y.o. 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      Symptomatically stable.Pt not interested in surgery at this time.  Recommend repeat debridements     Recommend restarting vitaminn E and pentoxyfilline     Gilbert Wu MD  Otology, Neurotology, and Skull Base Surgery  Department of Otorhinolaryngology  [facility]

## 2024-01-11 ENCOUNTER — OFFICE VISIT (OUTPATIENT)
Dept: PALLIATIVE MEDICINE | Facility: CLINIC | Age: 54
End: 2024-01-11
Payer: MEDICAID

## 2024-01-11 VITALS
OXYGEN SATURATION: 96 % | TEMPERATURE: 98 F | SYSTOLIC BLOOD PRESSURE: 102 MMHG | HEART RATE: 88 BPM | BODY MASS INDEX: 35.78 KG/M2 | WEIGHT: 227.94 LBS | HEIGHT: 67 IN | DIASTOLIC BLOOD PRESSURE: 66 MMHG

## 2024-01-11 DIAGNOSIS — F41.9 ANXIETY AND DEPRESSION: ICD-10-CM

## 2024-01-11 DIAGNOSIS — F32.A ANXIETY AND DEPRESSION: ICD-10-CM

## 2024-01-11 DIAGNOSIS — C79.81 METASTATIC MALIGNANT NEOPLASM TO BREAST: Primary | ICD-10-CM

## 2024-01-11 DIAGNOSIS — F41.9 ANXIETY: ICD-10-CM

## 2024-01-11 DIAGNOSIS — R10.2 PELVIC PAIN: ICD-10-CM

## 2024-01-11 DIAGNOSIS — Z51.5 PALLIATIVE CARE ENCOUNTER: ICD-10-CM

## 2024-01-11 DIAGNOSIS — R10.30 LOWER ABDOMINAL PAIN: ICD-10-CM

## 2024-01-11 PROCEDURE — 99214 OFFICE O/P EST MOD 30 MIN: CPT | Mod: S$PBB,,,

## 2024-01-11 PROCEDURE — 3008F BODY MASS INDEX DOCD: CPT | Mod: CPTII,,,

## 2024-01-11 PROCEDURE — 3078F DIAST BP <80 MM HG: CPT | Mod: CPTII,,,

## 2024-01-11 PROCEDURE — 1159F MED LIST DOCD IN RCRD: CPT | Mod: CPTII,,,

## 2024-01-11 PROCEDURE — 3074F SYST BP LT 130 MM HG: CPT | Mod: CPTII,,,

## 2024-01-11 PROCEDURE — 99215 OFFICE O/P EST HI 40 MIN: CPT | Mod: PBBFAC

## 2024-01-11 PROCEDURE — 99999 PR PBB SHADOW E&M-EST. PATIENT-LVL V: CPT | Mod: PBBFAC,,,

## 2024-01-11 RX ORDER — DULOXETIN HYDROCHLORIDE 60 MG/1
60 CAPSULE, DELAYED RELEASE ORAL
COMMUNITY
Start: 2024-01-09

## 2024-01-11 RX ORDER — ALPRAZOLAM 1 MG/1
.5-1 TABLET ORAL 2 TIMES DAILY PRN
Qty: 60 TABLET | Refills: 0 | Status: SHIPPED | OUTPATIENT
Start: 2024-01-12 | End: 2024-05-09 | Stop reason: SDUPTHER

## 2024-01-11 NOTE — PATIENT INSTRUCTIONS
Continue Norco 5mg every 6 hours as needed for pain  Discuss weaning off your Norco with your pain management  Ask Dr. Persaud if he can continue to manage your anxiety with Xanax  We will discharge you when you're ready.

## 2024-01-11 NOTE — PROGRESS NOTES
Palliative Medicine         Follow up    SUBJECTIVE:     Josey Flores is a 52 y.o. female with history of metastatic breast cancer, thyroid cancer post thyroidectomy (2017), and cervical cancer (2017). She is on surveillance on Letrozole and Ibrance. PET scan in May 2023 showed no evidence of recurrence or metastatic disease. She had a stroke with left-sided weakness (June 02, 2023). She is followed by Dr. Joseph    Chief complaint: Pain/Mood/Financial Distress  1/11/2024:   Pt attended clinic with her partner, Mr. Mccallum. She appeared uncomfortable. Vital signs stable on room air.   She has established care with chronic pain provider, Dr. Zurita  She reported she is now taking Lyrica and Duloxetine 60mg daily.   However, she has noted worsening abd/pelvic pain.   She is also attempting to self-wean her Norco 10mg Q6H PRN. She is now taking Norco 5mg Q6H PRN. Self weaning started 4-5 days ago   She is concerned about running out of narcotics and Dr. Zurita not willing to prescribe narcotics. I have explained to her I can not prescribe narcotics as her previous scans have shown no evidence of recurrence/metastasis.   Next CT on Saturday, Jan 13, 2024  Anxiety/Depression due to her dog's recent death.    Unsuccessful with smoking cessation due to stress    LA  Reviewed and Summarized:       Pt attended clinic with her partner, Alessio. She was in no acute no acute distress. Vital signs stable on room air. She continues to have  bilateral rib pain and lower abdominal pain 5/10, which she is managing with Norco 10mg QID PRN. Persistent anxiety/depression/insomnia/restlessness due to financial stress with moving to a new house and repairing her car.   She is taking Xanax 1mg BID, but no longer has Duloxetine.   We discussed referring her to chronic pain if her upcoming scan shows no evidence of recurrence.   Pt stated she is unsure why her pain has not improved despite scans showing no evidence of recurrence.     LA   Reviewed and Summarized:        Past Visits:     8/17/2023: Pt attended clinic with her partner, Alessio. She was in no acute distress. Vital signs stable on room air.  Pt reported bilateral rib pain and lower abdominal pain 5/10, which she is managing with Norco 10mg QID PRN. She states she used Morphine ER 15mg once when she could not sleep due to pain. She informed me she smoked marijuana occassionally to help with pain, anxiety, and insomnia. She obtains Marijuana from the street. We discussed referring her to a physician who can prescribe Medical Marijauna, as the ones obtained from the street are often contaminated with opioids and other substances, which can increase her risks for injury. She agreed to medical Marijuana referral through [facility].     She is responding to physiotherapy and rehabilitation well. However, she continues to experience anxiety due to the uncertainty of her granddaughter's whereabouts, who presumably lives in [place] with her mother and stepfather. She stated she is worried that her granddaughter is not being cared for due to her mother's history of substance abuse. She stated taking Xanax and Duloxetine have been helpful, and she is open to mental health counseling to further help with her anxiety/depression.       LA  Reviewed and Summarized:        7/12/2023: Pt attended clinic with her partner, Alessio. She was in no acute distress. Vital signs stable on room air. She stated she is tolerating NRT, post stroke rehabilitation/physiotherapy, and doing well overall considering her recent hospital admission for UTI (No admission record on Epic). She denied fevers/chills, hematuria, and dysuria, since completing her antibiotics course three days ago. She reported diarrhea, which she is currently managing with Lomotil.   She stated her bilateral rib pain is stable on Norco 10mg QID PRN. She is no longer taking MS ER. However, she stated she  sometimes wakes up in pain when she does not take Norco at night. She also expressed anxiety regarding her CT scan results. She stated she will like feel better once she discusses her results with her. She reported stable anxiety/depression with Xanax and Duloxetine.       LA  Reviewed and Summarized:        6/13/2023: Pt attended clinic with her partner, Alessio. She was in no acute distress. Vital signs stable on room air.    She reported persistent pain under her ribs bilaterally. She stated her Norco was not reinstated following her discharge from the hospital, and MSER 15mg once a day has not been effective for pain, though it helps with sleep. She stated she is aware that her PET scan showed no evidence of disease progression, but showed infection/inflammation in her lungs. She stated she is pleased it is not cancer, and she anxious to see the results of her upcoming CT Chest. She also reported anxiety, dyspnea, fatigue, insomnia, and restlessness which she attributes to persistent pain. She also reported running out of Xanax. She stated she is still depressed, but her mood has improved since starting Duloxetine. Her partner expressed feeling overwhelmed with appointments and driving to [place] for appointments with Neurology. He sought assistance with getting a neurology referral with [facility], but was told there was no available appointments for new pts. I informed pt and her partner that I am unsure of the process of scheduling Neurology referral at [facility], but I can ask my nursing staff to assist.     We discussed continuing opioid weaning, as her rib pain has not been shown to be cancer-related. Pt agreed to trial of stopping MSER 15mg, and continuing with Norco 10mg QID PRN for pain management. We also discussed chronic pain referral once her pain is stable. Pt and her partner agreed.     LA  Reviewed and Summarized:          4/18/2023: Pt attended clinic with her partner, Alessio.  She reported new bilateral under the rib and lower back pain that has started and worsened over the last two months. She stated she discussed this with Dr. Ba during her appointment on April 13, and a PET scan was ordered to assess for recurrent or metastatic disease. She denied, fever, chills, sweats, or dyspnea. She stated she has been taking Norco 10 four times a day, and MS ER 15mg Daily at night. She states she does not take MS ER in the morning due to fear of sleeping during the day. Pt also reported diarrhea, which has improved with Lomotil, in addition to increased anxiety and depression due to worsening pain.   I encouraged her to take Norco 10mg QID PRN for pain, and MS ER 15mg BID if her pain continues to worsen, and she is fine with mild sedation. Pt stated she is only comfortable with taking MSER at night. I noted she last filled her Norco   Pt stated she is willing to try an antidepressant for her mood, and she had stopped taking Wellbutrin about a month ago due to palpitations. I informed her that we will complete an EKG to assess her Qtc interval prior to starting an antidepressant. Her last EKG was in May 2021 showing Qtc of 435. Pt agreed to complete EKG.     2/14/2023: Patient comes to clinic with her significant other Alessio. We discussed her recent visits with Dr. Asencio and Deo. She is in good spirits as her Rest[facility] PET scan 1/11/2023 without evidence of recurrent metastatic disease active and no new areas concerning for active disease. She noted she was instructed to continue current treatment for breast cancer and surveillance for cervical cancer. She denies any issues and notes that she continues to be active and her and significant other are going out to eat after visit. She notes that she has intermittent pelvic pain in which she attributes to radiation but notes that current pain medication effective in managing. She notes that it does not happen often. In the setting of stable  scans and continued improvement clinically, we dicussed continuing opoid wean. Patient currently taking MSER 15mg po BID and taking 3-4 doses of Norco daily. Will decrease MSER to 15mg po Daily and continue current hydrocodone regimen not to exceed 4 doses in a day. Will follow up in 4 weeks to continue opoid wean. No new scripts needed as MSER and hydrocodone were just filled on 2/8/23.     12/1/22: Patient comes to clinic with her significant other Alessio. She is still feeling sad because of her grand daughter since she moved out. She noted that she stopped taking her Wellbutrin but wants to start back taking it to help with her mood also to help her with the process of quitting smoking. Also she notes she has been dealing with worsening pain to her left ear. She saw Otolaryngologist recently and he noted no signs of changes on CT and offered patient surgery or non surgical management. Patient notes that she did not want surgery at that time because her doctor made her aware that the procedure may help but there is a possibility that it may not. She notes that the pain and drainage has worsened and she is now thinking about proceeding with surgery even if there is a risk that it will not work. She has been in touch with her Otolaryngologist's office to schedule appointment. She last saw Dr. Asencio 10/13/22 in which there were no changes and she was to continue on current treatment withLetrozole and palbociclib. Her visit with Gyn/onc also noted no new changes and everything was stable. During exam patient reports pelvic pain but notes that medication has been effective in managing it. Being that there were no changes with all of her MD visits, I spoke with patient about continuing plan from her previous PM visit with weaning MSER. She is in agreement, we will wean down to MSER 15mg po BID. I did make patient aware that medications provided by me were not for her ear pain or or acute issues she may experience but it  is indicated for malignant pain. I reinforced education about taking medications as prescribed and for what it is indicated for. I made her aware that we could not send in another early prescription as my counterpart had to do in November. On 11/1/22,  patient sent a message noting she ran out of her medication before scheduled visit. She reported that she was [facility] due to abdominal pain and then when she was discharged she cut her foot very badly and had to receive stiches. Due to these issues, she used her medications more frequently than she was prescribed. She was taking 2 tabs of MSER 30mg BID instead of 1 BID. She was taking hydrocodone 4-5x/ day. She admitted that she was not supposed to do this and will not do this again. She was hoping that provider would write the 15mg ER tabs to hold her over. She explained why she would not write for the 15mg and typically do not write early refills. This is the second time that she has taken ER medications more frequently than prescribed. She was counseled gain on the risks with this behavior and this would be her last warning as well as last early refill. Moving forward if she takes opioids other than rx this is grounds for dismissal from our clinic given issues with impulse control and safety. She expressed understanding and said that her  has also scolded her. She knows this was not a good choice and continues to apologize and promised she will not do it again. My partner explained our boundaries in clinic are not punitive but rather in place for her safety. I was present with my counterpart for the conversation and agreed that early refills will be not be permitted moving forward    10/06/2022  She comes to clinic with DEYSI Mccallum today. She is down because her granddaughter is not in a good situation but otherwise feeling okay. Since we last saw each other her pain has improved which is likely related to the positive response to  "treatment. She would like to begin weaning the MSER back to 30mg BID and hopefully continue to decrease further. She is still taking hydrocodone 10mg QID with good response. Her mood improved with the resumption of Wellbutrin and is still cutting back on her tobacco use. She has been having ear pain which is being evaluated by ENT and is waiting to hear when her CT is scheduled. There seems to have been a charting error with her Letrozole resulting in d/c the order and unable to refill. I will reach out to oncology to reorder. We will follow up in 2 months and she will message me for refills whether she would like to continue weaning before then. We do not have HCPOA on file even though she remembers bringing to clinic. She will bring again next visit and confirms that her sister David is HCPOA.    09/01/22: Ms. Flores comes to clinic today with her yi Mccallum. She reports that her lower abdominal pain has worsened since brachytherapy started several weeks ago. She reports spontaneous lower abdominal pain not related to food but sometimes exacerbated by BM. She denies constipation. She has taken an extra dose of MSER at bedtime to help with sleep. We talked about the risks associated with this and would advise against doing this again. She is taking hydrocodone q4hr sometimes waking up at night and needing another dose averaging 4-6 in a day. I recommend we increase the long acting dose in hopes for better control and less PRN usage. She says her sleep is poor because this is when the pain intensifies (lying flat) but also anxiety and sadness since her granddaughter moved out of the home. She is taking Xanax HS which helps. She was taking Wellbutrin in the last but not sure why she stopped. For now lets not resume this and evaluate at her next visit.    06/16/2022: Patient doing okay.  No new issues since her last visit.  She continue to have some lower GI pains describes now as "cramping after a BM".  Previously, " her discomfort was after eating.  She has been referred to GI.  She has some fatigue but managing.  Pain is controlled on current regimen.  She has upcoming scans and appointment with Dr. Asencio.  She has some anxiety and life stressors affecting her situation.  Her son and his ex-girfriend (now in [place]) can no longer care for their 4 year old daughter.  She and her partner are assuming the parental rights of her granddaughter.  They seem to be coping as well as can be expected.  Overall, she is doing okay.  We will continue to follow at quarterly intervals.  She knows to reach out if her situation changes or symptom burden worsens.    03/10/2022: Overall, patient seems to be doing okay.  She has some new GI issues with abdominal pain after eating and intermittent diarrhea not felt to be related to her disease or treatment.  Oncology discussed GI referral and she is considering this if no improvement.  We are adjusting her pain medication regimen today mostly to cut back on her prn Norco which she continues to use consistently at 5-6 tablets/day.  She knows she can reach out for additional questions or concerns.     01/06/2022: Patient is a 51 y.o. year old female presenting with for palliative follow up for her metastatic breast cancer, SCC of the cervix. Treatments currently on hold due to recurrent ear infection. She is anxious about results of her recent scan and fearful of disease progression while off treatment.  Otherwise, her pain continues to be controlled on her current regimen.  No new symptoms or complaints today.    ONCOLOGY DIAGNOSES:  Stage IV cervical squamous cell carcinoma   stage IV, T2 N1b M1, invasive breast carcinoma, ER/CA+, HER2-   Papillary thyroid carcinoma status post total thyroidectomy on 08/31/2017, mpT1b N0    Cervical HSIL MIERILLE 3 s/p LEEP, 2017    Patient looks and feels better compared to last visit.  She walked into her appt.  Ear is better and she is being followed by ENT.  She has  resumed treatment for both her cervical and breast cancer and is followed closely by medical oncology and gyn oncology.  Pain continues to be controlled on current medication regimen.  She is having knee issues with swelling and likely OA.  We discussed referral to orthopedics.  She also plans to follow up with neurosurgery.  Overall, no major changes and no new issues.     LA  reviewed and summarized:      OBJECTIVE:     Review of Systems   Constitutional:  Negative for malaise/fatigue and weight loss.   HENT:  Negative for sore throat.    Respiratory:  Positive for shortness of breath (Intermittent on exertion). Negative for cough and wheezing.    Cardiovascular:  Negative for chest pain and palpitations.   Gastrointestinal:  Positive for nausea. Negative for abdominal pain, constipation and diarrhea.   Musculoskeletal:         Bilateral rib pain   Neurological:  Positive for weakness (S/P stroke. Left-sided weakness). Negative for dizziness, tingling, sensory change, speech change, seizures, loss of consciousness and headaches.   Psychiatric/Behavioral:  Positive for depression. Negative for suicidal ideas. The patient is nervous/anxious and has insomnia.         Review of Symptoms      Symptom Assessment (ESAS 0-10 Scale)  Pain:  7  Dyspnea:  6  Anxiety:  9  Nausea:  5  Depression:  9  Anorexia:  9  Fatigue:  0  Insomnia:  10  Restlessness:  7  Agitation:  9     CAM / Delirium:  Negative  Constipation:  Negative  Diarrhea:  Negative    Anxiety:  Is nervous/anxious  Constipation:  No constipation    Bowel Management Plan (BMP):  Yes      Pain Assessment    Location(s): Abdomen and bilateral ribs    Abdomen       Location: lower        Quality: Aching        Quantity: 7/10 in intensity        Chronicity: Onset 1 year (s) ago, uncontrolled       Aggravating Factors: Activity        Alleviating Factors: Opiates       Associated Symptoms: Myalgias      Modified Osmar Scale:  0    ECOG Performance Status Grade:   2    Living Arrangements:  Lives with spouse    Psychosocial/Cultural:   See Palliative Psychosocial Note: No  Lives with her significant other of 16 years: they have 3 children combined, 2 sons from previous marriage. Granddaughter recently moved out of the home  **Primary  to Follow**  Palliative Care  Consult: No    Advance Care Planning   Advance Directives:   Living Will: Yes        Copy on chart: Yes    LaPOST: Yes    Do Not Resuscitate Status: Yes    Agent's Name:  David Acevedo, sister, [phone]    Decision Making:  Patient answered questions  Goals of Care: What is most important right now is to focus on remaining as independent as possible, symptom/pain control, curative/life-prolongation (regardless of treatment burdens). Accordingly, we have decided that the best plan to meet the patient's goals includes continuing with treatment.      Physical Exam:  Vitals:    Physical Exam  Vitals and nursing note reviewed.   Constitutional:       General: She is not in acute distress.     Appearance: Normal appearance. She is normal weight. She is not ill-appearing.   HENT:      Head: Normocephalic and atraumatic.      Comments: Left facial droop     Nose: Nose normal. No congestion or rhinorrhea.      Mouth/Throat:      Mouth: Mucous membranes are moist.      Pharynx: Oropharynx is clear. No oropharyngeal exudate or posterior oropharyngeal erythema.   Eyes:      General: No scleral icterus.        Right eye: No discharge.         Left eye: No discharge.      Conjunctiva/sclera: Conjunctivae normal.      Pupils: Pupils are equal, round, and reactive to light.   Cardiovascular:      Rate and Rhythm: Normal rate and regular rhythm.      Pulses: Normal pulses.      Heart sounds: Normal heart sounds. No murmur heard.     No gallop.   Pulmonary:      Effort: Pulmonary effort is normal. No respiratory distress.      Breath sounds: Normal breath sounds. No stridor. No wheezing.   Chest:       Chest wall: No tenderness.   Abdominal:      General: Bowel sounds are normal. There is no distension.      Palpations: Abdomen is soft.      Tenderness: There is abdominal tenderness (lower abdomen).   Musculoskeletal:         General: No swelling or tenderness. Normal range of motion.      Cervical back: Normal range of motion and neck supple.      Right lower leg: No edema.      Left lower leg: No edema.      Comments: S/P stroke. Left arm and left leg weakness   Skin:     General: Skin is warm and dry.      Capillary Refill: Capillary refill takes less than 2 seconds.      Coloration: Skin is not jaundiced or pale.      Findings: No rash.   Neurological:      Mental Status: She is alert and oriented to person, place, and time.      GCS: GCS eye subscore is 4. GCS verbal subscore is 5. GCS motor subscore is 6.      Motor: Weakness (s/p stroke June 02/ Left sided weakness) present.      Gait: Gait abnormal (Limping).   Psychiatric:         Attention and Perception: Attention normal.         Mood and Affect: Mood is anxious.         Speech: Speech normal.         Behavior: Behavior normal.         Thought Content: Thought content normal.         Cognition and Memory: Cognition normal.         Judgment: Judgment normal.          Radiology and laboratory data reviewed    ASSESSMENT/PLAN:     Malignant neoplasm of overlapping sites of right breast in female, estrogen receptor positive  -Followed by Dr. Joseph. Previously Dr. Asencio  -On Surveillance- Continues Letrozole and Ibrance  -PET scan 5/03: There is a focal mildly hypermetabolic ground-glass opacity in the left lower lobe which is new from the prior examination. This is more likely infectious/inflammatory in nature. Short-term CT chest follow-up is advised.  CT C/A/P: 1/13/2024: Stable exam.  7 mm right nodule, unchanged.  Calcified granuloma with calcified right hilar lymph nodes.  Fatty infiltration of the liver.  Status post cholecystectomy.    Malignant  neoplasm of endocervix  -Followed by  noted JUAN FRANCISCO  -Holding further maintenance immunotherapy at this time given no evidence of recurrent / progressive metastatic disease    -Surveillance for cervical cancer.    Abd/pelvic pain  Unclear etiology, as scans have been stable.  -Stop MSER as it is not effective, and trial opioid weaning in the setting of stable scans.  - Pt prefers to stop long-acting than decrease Norco frequency.  -Continue Norco 5mg QID PRN not to exceed 4 doses in a day. Pt now self-wean from Christmas 10mg QID PRN.  Pt now seeing chronic pain management, Dr. Zurita given no evidence of cancer recurrence or metastasis  Pt to discuss opioid weaning with Dr. Zurita to avoid withdrawal.  UDS-7/12/2023: Showed Oxycodone, marijuana, and Morphine.  -Referral to [facility] for Medical Marijuana RX  Narcan rx- Pt stated she knows how to use it     Anxiety/Depression  -Uncontrolled due to financial stress.   -Continue Xanax 0.5mg-1mg BID PRN  -Continue Duloxetine 60mg Daily. Increased from 30mg daily by Dr. Zurita.   -Referral to [facility] for Medical Marijuana RX  -Referral to Hem/Onc Psych  -SW referral to assist with available grants for car repair.    Palliative Care Encounter  -HCPOA:  David Acevedo, sister, [phone]  -Code Status: DNR; LaPMIGUEL completed 1/6/2022 and uploaded in EMR  -[place] [facility]-Continue cancer treatment with surveillance, symptom management, improve functional status with rehabilitation.      Follow up: 3 months     40 minutes of total time spent on the encounter, which includes face to face time and non-face to face time preparing to see the patient (eg, review of tests), Obtaining and/or reviewing separately obtained history, Documenting clinical information in the electronic or other health record, Independently interpreting results (not separately reported) and communicating results to the patient/family/caregiver, or Care coordination (not separately reported).    Signature: SUZIE  DAKOTA GOTTI, NP

## 2024-01-13 ENCOUNTER — HOSPITAL ENCOUNTER (OUTPATIENT)
Dept: RADIOLOGY | Facility: HOSPITAL | Age: 54
Discharge: HOME | End: 2024-01-13
Attending: INTERNAL MEDICINE
Payer: MEDICAID

## 2024-01-13 DIAGNOSIS — C77.3 MALIGNANT NEOPLASM METASTATIC TO LYMPH NODE OF AXILLA: ICD-10-CM

## 2024-01-13 PROCEDURE — 74150 CT ABDOMEN W/O CONTRAST: CPT | Mod: 26,,, | Performed by: RADIOLOGY

## 2024-01-13 PROCEDURE — 71250 CT THORAX DX C-: CPT | Mod: 26,,, | Performed by: RADIOLOGY

## 2024-01-13 PROCEDURE — 74150 CT ABDOMEN W/O CONTRAST: CPT | Mod: TC

## 2024-01-22 DIAGNOSIS — C77.3 MALIGNANT NEOPLASM METASTATIC TO LYMPH NODE OF AXILLA: Primary | ICD-10-CM

## 2024-01-29 ENCOUNTER — TELEPHONE (OUTPATIENT)
Dept: INFUSION THERAPY | Facility: HOSPITAL | Age: 54
End: 2024-01-29
Payer: MEDICAID

## 2024-01-29 NOTE — TELEPHONE ENCOUNTER
Patient calling to r/s her missed appts. All appts scheduled per her preferences. Call ended well.

## 2024-02-01 ENCOUNTER — TELEPHONE (OUTPATIENT)
Dept: HEMATOLOGY/ONCOLOGY | Facility: CLINIC | Age: 54
End: 2024-02-01
Payer: MEDICAID

## 2024-02-01 NOTE — TELEPHONE ENCOUNTER
Swrk intern attempted to a call to patient regarding distress level score. Swrk intern attempted both numbers listed for patient; however, patient was not available. Swrk intern left voice messages on both numbers for patient. Swrk intern will remain available.

## 2024-02-13 ENCOUNTER — OFFICE VISIT (OUTPATIENT)
Dept: HEMATOLOGY/ONCOLOGY | Facility: CLINIC | Age: 54
End: 2024-02-13
Payer: MEDICAID

## 2024-02-13 ENCOUNTER — INFUSION (OUTPATIENT)
Dept: INFUSION THERAPY | Facility: HOSPITAL | Age: 54
End: 2024-02-13
Attending: INTERNAL MEDICINE
Payer: MEDICAID

## 2024-02-13 VITALS
OXYGEN SATURATION: 98 % | TEMPERATURE: 98 F | HEART RATE: 93 BPM | DIASTOLIC BLOOD PRESSURE: 82 MMHG | SYSTOLIC BLOOD PRESSURE: 127 MMHG | BODY MASS INDEX: 36.02 KG/M2 | WEIGHT: 229.5 LBS | HEIGHT: 67 IN

## 2024-02-13 DIAGNOSIS — R05.9 COUGH, UNSPECIFIED TYPE: ICD-10-CM

## 2024-02-13 DIAGNOSIS — E03.9 HYPOTHYROIDISM, UNSPECIFIED TYPE: ICD-10-CM

## 2024-02-13 DIAGNOSIS — C77.3 MALIGNANT NEOPLASM METASTATIC TO LYMPH NODE OF AXILLA: Primary | ICD-10-CM

## 2024-02-13 DIAGNOSIS — Z17.0 MALIGNANT NEOPLASM OF OVERLAPPING SITES OF RIGHT BREAST IN FEMALE, ESTROGEN RECEPTOR POSITIVE: Primary | ICD-10-CM

## 2024-02-13 DIAGNOSIS — C79.51 SECONDARY CANCER OF BONE: ICD-10-CM

## 2024-02-13 DIAGNOSIS — C50.919 PRIMARY MALIGNANT NEOPLASM OF BREAST WITH METASTASIS TO OTHER SITE, UNSPECIFIED LATERALITY: ICD-10-CM

## 2024-02-13 DIAGNOSIS — C73 PAPILLARY THYROID CARCINOMA: ICD-10-CM

## 2024-02-13 DIAGNOSIS — C77.3 MALIGNANT NEOPLASM METASTATIC TO LYMPH NODE OF AXILLA: ICD-10-CM

## 2024-02-13 DIAGNOSIS — C50.811 MALIGNANT NEOPLASM OF OVERLAPPING SITES OF RIGHT BREAST IN FEMALE, ESTROGEN RECEPTOR POSITIVE: Primary | ICD-10-CM

## 2024-02-13 DIAGNOSIS — Z17.0 MALIGNANT NEOPLASM OF OVERLAPPING SITES OF RIGHT BREAST IN FEMALE, ESTROGEN RECEPTOR POSITIVE: ICD-10-CM

## 2024-02-13 DIAGNOSIS — C50.811 MALIGNANT NEOPLASM OF OVERLAPPING SITES OF RIGHT BREAST IN FEMALE, ESTROGEN RECEPTOR POSITIVE: ICD-10-CM

## 2024-02-13 PROCEDURE — 63600175 PHARM REV CODE 636 W HCPCS: Performed by: INTERNAL MEDICINE

## 2024-02-13 PROCEDURE — 25000003 PHARM REV CODE 250: Performed by: INTERNAL MEDICINE

## 2024-02-13 PROCEDURE — 3008F BODY MASS INDEX DOCD: CPT | Mod: CPTII,,, | Performed by: INTERNAL MEDICINE

## 2024-02-13 PROCEDURE — 3074F SYST BP LT 130 MM HG: CPT | Mod: CPTII,,, | Performed by: INTERNAL MEDICINE

## 2024-02-13 PROCEDURE — 3079F DIAST BP 80-89 MM HG: CPT | Mod: CPTII,,, | Performed by: INTERNAL MEDICINE

## 2024-02-13 PROCEDURE — 99215 OFFICE O/P EST HI 40 MIN: CPT | Mod: PBBFAC,25 | Performed by: INTERNAL MEDICINE

## 2024-02-13 PROCEDURE — 96523 IRRIG DRUG DELIVERY DEVICE: CPT

## 2024-02-13 PROCEDURE — 99999 PR PBB SHADOW E&M-EST. PATIENT-LVL V: CPT | Mod: PBBFAC,,, | Performed by: INTERNAL MEDICINE

## 2024-02-13 PROCEDURE — 99214 OFFICE O/P EST MOD 30 MIN: CPT | Mod: S$PBB,,, | Performed by: INTERNAL MEDICINE

## 2024-02-13 PROCEDURE — A4216 STERILE WATER/SALINE, 10 ML: HCPCS | Performed by: INTERNAL MEDICINE

## 2024-02-13 RX ORDER — PROMETHAZINE HYDROCHLORIDE AND DEXTROMETHORPHAN HYDROBROMIDE 6.25; 15 MG/5ML; MG/5ML
5 SYRUP ORAL NIGHTLY PRN
Qty: 473 ML | Refills: 0 | Status: SHIPPED | OUTPATIENT
Start: 2024-02-13 | End: 2024-05-18

## 2024-02-13 RX ORDER — SODIUM CHLORIDE 0.9 % (FLUSH) 0.9 %
10 SYRINGE (ML) INJECTION
OUTPATIENT
Start: 2024-02-13

## 2024-02-13 RX ORDER — HEPARIN 100 UNIT/ML
500 SYRINGE INTRAVENOUS
Status: CANCELLED | OUTPATIENT
Start: 2024-02-13

## 2024-02-13 RX ORDER — HEPARIN 100 UNIT/ML
500 SYRINGE INTRAVENOUS
Status: DISCONTINUED | OUTPATIENT
Start: 2024-02-13 | End: 2024-02-13 | Stop reason: HOSPADM

## 2024-02-13 RX ORDER — SODIUM CHLORIDE 0.9 % (FLUSH) 0.9 %
10 SYRINGE (ML) INJECTION
Status: DISCONTINUED | OUTPATIENT
Start: 2024-02-13 | End: 2024-02-13 | Stop reason: HOSPADM

## 2024-02-13 RX ORDER — SODIUM CHLORIDE 0.9 % (FLUSH) 0.9 %
10 SYRINGE (ML) INJECTION
Status: CANCELLED | OUTPATIENT
Start: 2024-02-13

## 2024-02-13 RX ORDER — HEPARIN 100 UNIT/ML
500 SYRINGE INTRAVENOUS
OUTPATIENT
Start: 2024-02-13

## 2024-02-13 RX ADMIN — HEPARIN 500 UNITS: 100 SYRINGE at 08:02

## 2024-02-13 RX ADMIN — SODIUM CHLORIDE, PRESERVATIVE FREE 10 ML: 5 INJECTION INTRAVENOUS at 08:02

## 2024-02-13 NOTE — NURSING
"Pt here for Mediport Flush. Mediport accessed with a 20g 1" tomlin via sterile technique.  Excellent blood return noted.  Flushed with 10ml NS and 5 ml heparin solution.  Needle D/C, site without redness, swelling, or drainage noted.  Dressing applied.  Patient tolerated well.  "

## 2024-02-13 NOTE — PROGRESS NOTES
[facility]  [address] [facility] [address]  Phone: [phone];  Fax: [phone]    Patient ID: Josey Flores   Chief Complaint: Follow-up  MRN:  [id number]     Oncologic Diagnosis:    - Stage IV cervical squamous cell carcinoma  - Stage IV, T2 N1b M1, invasive breast carcinoma, ER/OR+, HER2-  - Papillary thyroid carcinoma status post total thyroidectomy on 08/31/2017, mpT1b N0   - Cervical HSIL MIREILLE 3 s/p LEEP, 2017  Previous Treatment:    - Palliative chemotherapy: cisplatin, paclitaxel and bevacizumab; 02/22/2021 cycle 1 day 1 -> bevacizumab maintenance after 6 cycles  - Carboplatin, paclitaxel and pembrolizumab, C1D1 1/28/22; maintenance pembrolizumab, d/c 9/2022  Current Treatment:    - Letrozole and palbociclib   Subjective   Josey Flores is a 53 y.o. female who presents to clinic for follow up and is accompanied by her spouse.      It is our first time meeting. She reports continued pain around her sides and around her abdomen; she has been weaned off of her chronic pain medication.  She reports a worsening cough over 4 months; she has had the cough for at least 1 year.  She continues to smoke and has symptoms of sleep apnea.  I discussed with her that Ibrance does have a risk of pneumonitis associated with it, so we should check a CXR. Her restaging imaging a few weeks ago with CT CAP showed no signs of pneumonitis.    Review of Systems:  Review of Systems   Constitutional:  Positive for appetite change (decreased). Negative for activity change, chills, diaphoresis, fatigue, fever and unexpected weight change.   HENT:  Negative for nosebleeds.    Respiratory:  Positive for cough (mostly when laying down). Negative for shortness of breath.    Cardiovascular:  Negative for chest pain.   Gastrointestinal:  Positive for abdominal pain. Negative for abdominal distention, anal bleeding, blood in stool, constipation, diarrhea, nausea and vomiting.    Genitourinary:  Negative for difficulty urinating and hematuria.   Musculoskeletal:  Positive for arthralgias. Negative for back pain and myalgias.   Skin:  Negative for rash.   Neurological:  Negative for dizziness, weakness, light-headedness and headaches.   Hematological:  Does not bruise/bleed easily.   Psychiatric/Behavioral:  Positive for sleep disturbance (due to pain). The patient is not nervous/anxious.      History     Oncology History   Secondary cancer of bone    Chemotherapy    Treatment Summary   Plan Name: OP GYN PACLITAXEL CISPLATIN BEVACIZUMAB Q3W  Treatment Goal: Control  Status: Inactive  Start Date: 2/22/2021  End Date: 7/7/2021  Provider: Estefani Asencio MD  Chemotherapy: CISplatin (PLATINOL) 50 mg/m2 = 108 mg in sodium chloride 0.9% 608 mL chemo infusion, 50 mg/m2 = 108 mg, Intravenous, Clinic/HOD 1 time, 6 of 6 cycles  Administration: 108 mg (2/22/2021), 108 mg (3/15/2021), 108 mg (4/6/2021), 108 mg (5/11/2021), 108 mg (6/1/2021), 108 mg (6/22/2021)    bevacizumab (AVASTIN) 15 mg/kg = 1,480 mg in sodium chloride 0.9% 100 mL chemo infusion, 15 mg/kg = 1,480 mg, Intravenous, Clinic/HOD 1 time, 6 of 6 cycles  Administration: 1,480 mg (4/6/2021), 1,480 mg (2/23/2021), 1,480 mg (3/16/2021), 1,480 mg (6/1/2021), 1,480 mg (6/22/2021), 1,480 mg (5/12/2021)    PACLitaxeL (TAXOL) 175 mg/m2 = 378 mg in sodium chloride 0.9% 563 mL chemo infusion, 175 mg/m2 = 378 mg, Intravenous, Clinic/HOD 1 time, 6 of 6 cycles  Administration: 378 mg (2/22/2021), 378 mg (3/15/2021), 378 mg (4/6/2021), 378 mg (5/11/2021), 378 mg (6/1/2021), 378 mg (6/22/2021)    Plan Name: OP BEVACIZUMAB Q3W   Treatment Goal: Maintenance  Status: Inactive  Start Date: 7/13/2021  End Date: 12/28/2021  Provider: Estefani Asencio MD  Chemotherapy: bevacizumab (AVASTIN) 1,300 mg in sodium chloride 0.9% 100 mL chemo infusion, 1,345 mg, Intravenous, Clinic/[facility] 1 time, 9 of 17 cycles  Administration: 1,300 mg (7/13/2021), 1,345 mg  (8/3/2021), 1,300 mg (8/24/2021), 1,300 mg (9/14/2021), 1,345 mg (10/5/2021), 1,345 mg (10/26/2021), 1,345 mg (11/16/2021), 1,300 mg (12/7/2021), 1,300 mg (12/28/2021)    Plan Name: OP CARBOPLATIN PACLITAXEL PEMBROLIZUMAB Q3W FOLLOWED BY MAINTENANCE PEMBROLIZUMAB 400 MG Q6W  Treatment Goal: Control  Status: Inactive  Start Date: 1/28/2022  End Date: 8/11/2022  Provider: Estefani Asencio MD  Chemotherapy: CARBOplatin (PARAPLATIN) 425 mg in sodium chloride 0.9% 292.5 mL chemo infusion, 425 mg, Intravenous, Clinic/HOD 1 time, 6 of 6 cycles  Dose modification:   (original dose 424 mg, Cycle 6),   (original dose 424 mg, Cycle 6)  Administration: 425 mg (1/28/2022), 495 mg (2/18/2022), 525 mg (6/2/2022), 525 mg (5/12/2022), 525 mg (3/11/2022), 495 mg (4/11/2022)    PACLitaxeL (TAXOL) 175 mg/m2 = 366 mg in sodium chloride 0.9% 561 mL chemo infusion, 175 mg/m2 = 366 mg (100 % of original dose 175 mg/m2), Intravenous, Clinic/HOD 1 time, 6 of 6 cycles  Dose modification: 175 mg/m2 (original dose 175 mg/m2, Cycle 1), 175 mg/m2 (original dose 175 mg/m2, Cycle 4)  Administration: 366 mg (1/28/2022), 366 mg (2/18/2022), 366 mg (3/11/2022), 366 mg (4/11/2022), 366 mg (5/12/2022), 366 mg (6/2/2022)       Malignant neoplasm of endocervix   9/10/2020 Imaging Significant Findings    CT A/P: Negative for ascites, omental caking, lymphadenopathy, or a gross cervical mass     12/7/2020 Biopsy    EMBX: SCC, p16+     1/12/2021 Imaging Significant Findings    MRI Pelvis w/ w/o  4.9 cm cervical mass  R parametrial invasion  R external iliac lymphadenopathy     1/27/2021 Imaging Significant Findings    PET/CT: FDG avidity in the chest     2/18/2021 Biopsy    Endoscopic biopsy of the lung: +      2/22/2021 - 6/29/2021 Chemotherapy    Cisplatin/paclitaxel/avastin x6     3/12/2021 Genetic Testing    STRATA: FRANK, PIK3c, Ep300, ERBB3, KDM6A     4/24/2021 Imaging Significant Findings    CT Pelvis w/: JUAN FRANCISCO with a normal cervix     4/26/2021  Imaging Significant Findings    PET/CT: No FDG avidity     5/18/2021 Imaging Significant Findings    CT A/P w/: JUAN FRANCISCO     6/5/2021 Imaging Significant Findings    CT A/P w/: JUAN FRANCISCO     7/12/2021 Imaging Significant Findings    PET/CT: JUAN FRANCISCO     7/13/2021 - 12/28/2021 Maintenance Therapy    Avastin x 9     10/20/2021 Imaging Significant Findings    PET/CT: JUAN FRANCISCO     1/11/2022 Imaging Significant Findings    PET/CT: Increased FDG avidity in the cervix.  No other evidence of disease.     1/28/2022 - 6/2/2022 Chemotherapy    Carboplatin/paclitaxel/pembrolizumab x 6     3/25/2022 Imaging Significant Findings    PET/CT: Persistent FDG avidity in the cervix.  New FDG avidity in the inguinal lymph nodes (although it does not look suspicious)     4/25/2022 - 4/29/2022 Radiation Therapy    Treating physician: Dr. Vázquez (MRI guided T&O)  Total Dose: 30 Gy  Fractions: 5     6/30/2022 - 8/11/2022 Maintenance Therapy    Pembrolizumab 400 mg IV x2     8/5/2022 Imaging Significant Findings    PET/CT: JUAN FRANCISCO     10/16/2022 Imaging Significant Findings    CT A/P w/ (Women's without imaging available): JUAN FRANCISCO     1/11/2023 Imaging Significant Findings    PET/CT: JUAN FRANCISCO      Chemotherapy    Treatment Summary   Plan Name: OP GYN PACLITAXEL CISPLATIN BEVACIZUMAB Q3W  Treatment Goal: Control  Status: Inactive  Start Date: 2/22/2021  End Date: 7/7/2021  Provider: Estefani Asencio MD  Chemotherapy: CISplatin (PLATINOL) 50 mg/m2 = 108 mg in sodium chloride 0.9% 608 mL chemo infusion, 50 mg/m2 = 108 mg, Intravenous, Clinic/HOD 1 time, 6 of 6 cycles  Administration: 108 mg (2/22/2021), 108 mg (3/15/2021), 108 mg (4/6/2021), 108 mg (5/11/2021), 108 mg (6/1/2021), 108 mg (6/22/2021)    bevacizumab (AVASTIN) 15 mg/kg = 1,480 mg in sodium chloride 0.9% 100 mL chemo infusion, 15 mg/kg = 1,480 mg, Intravenous, Clinic/HOD 1 time, 6 of 6 cycles  Administration: 1,480 mg (4/6/2021), 1,480 mg (2/23/2021), 1,480 mg (3/16/2021), 1,480 mg (6/1/2021), 1,480 mg  (6/22/2021), 1,480 mg (5/12/2021)    PACLitaxeL (TAXOL) 175 mg/m2 = 378 mg in sodium chloride 0.9% 563 mL chemo infusion, 175 mg/m2 = 378 mg, Intravenous, Clinic/HOD 1 time, 6 of 6 cycles  Administration: 378 mg (2/22/2021), 378 mg (3/15/2021), 378 mg (4/6/2021), 378 mg (5/11/2021), 378 mg (6/1/2021), 378 mg (6/22/2021)    Plan Name: OP BEVACIZUMAB Q3W   Treatment Goal: Maintenance  Status: Inactive  Start Date: 7/13/2021  End Date: 12/28/2021  Provider: Estefani Asencio MD  Chemotherapy: bevacizumab (AVASTIN) 1,300 mg in sodium chloride 0.9% 100 mL chemo infusion, 1,345 mg, Intravenous, Clinic/HOD 1 time, 9 of 17 cycles  Administration: 1,300 mg (7/13/2021), 1,345 mg (8/3/2021), 1,300 mg (8/24/2021), 1,300 mg (9/14/2021), 1,345 mg (10/5/2021), 1,345 mg (10/26/2021), 1,345 mg (11/16/2021), 1,300 mg (12/7/2021), 1,300 mg (12/28/2021)    Plan Name: OP CARBOPLATIN PACLITAXEL PEMBROLIZUMAB Q3W FOLLOWED BY MAINTENANCE PEMBROLIZUMAB 400 MG Q6W  Treatment Goal: Control  Status: Inactive  Start Date: 1/28/2022  End Date: 8/11/2022  Provider: Estefani Asencio MD  Chemotherapy: CARBOplatin (PARAPLATIN) 425 mg in sodium chloride 0.9% 292.5 mL chemo infusion, 425 mg, Intravenous, Clinic/HOD 1 time, 6 of 6 cycles  Dose modification:   (original dose 424 mg, Cycle 6),   (original dose 424 mg, Cycle 6)  Administration: 425 mg (1/28/2022), 495 mg (2/18/2022), 525 mg (6/2/2022), 525 mg (5/12/2022), 525 mg (3/11/2022), 495 mg (4/11/2022)    PACLitaxeL (TAXOL) 175 mg/m2 = 366 mg in sodium chloride 0.9% 561 mL chemo infusion, 175 mg/m2 = 366 mg (100 % of original dose 175 mg/m2), Intravenous, Clinic[facility] 1 time, 6 of 6 cycles  Dose modification: 175 mg/m2 (original dose 175 mg/m2, Cycle 1), 175 mg/m2 (original dose 175 mg/m2, Cycle 4)  Administration: 366 mg (1/28/2022), 366 mg (2/18/2022), 366 mg (3/11/2022), 366 mg (4/11/2022), 366 mg (5/12/2022), 366 mg (6/2/2022)             Past Medical History:   Diagnosis Date    Anxiety      Asthma     Breast cancer 2017    Cancer     right breast, metastatic-lymph nodes, bone, and thyroid     COPD (chronic obstructive pulmonary disease)     Depression     History of psychiatric hospitalization     2000; suicidal ideation    Hx of psychiatric care     Hypothyroidism     Miscarriage     five miscarriages    Obesity     Psychiatric problem     Thyroid cancer 2017       Past Surgical History:   Procedure Laterality Date    ABSCESS DRAINAGE      bilateral axilla    ADENOIDECTOMY      APPENDECTOMY      BREAST BIOPSY Right     x3    CHOLECYSTECTOMY      ENDOBRONCHIAL ULTRASOUND Bilateral 02/18/2021    Procedure: ENDOBRONCHIAL ULTRASOUND (EBUS);  Surgeon: Jaron Ni MD;  Location: [facility] ENDO;  Service: Pulmonary;  Laterality: Bilateral;    FLUOROSCOPY N/A 01/28/2021    Procedure: Mediport placement;  Surgeon: Noah Phelan MD;  Location: [facility] CATH LAB;  Service: General;  Laterality: N/A;    MASS EXCISION      MEDIPORT INSERTION, SINGLE Right 02/2021    SKIN GRAFT  06/16/2017    THYROIDECTOMY Bilateral     TONSILLECTOMY         Family History   Problem Relation Age of Onset    Arthritis Mother     Early death Mother         64 at time of death    Heart attack Mother     Heart disease Mother     Heart failure Mother     Hypertension Mother     Coronary artery disease Father     Hearing loss Father     Heart disease Father     Hyperlipidemia Father     Hypertension Father     Mental illness Father     Learning disabilities Son     Cancer Maternal Aunt        Review of patient's allergies indicates:   Allergen Reactions    Gabapentin Swelling     Note: unilateral joint edema, unclear if due to gabapentin    Nsaids (non-steroidal anti-inflammatory drug) Other (See Comments)     Instructed not to take NSAID drugs with chemo pill.    Zometa [zoledronic acid] Other (See Comments)     Possible osteonecrosis jaw    Clindamycin Rash    Vancomycin analogues Itching       Social History     Tobacco Use     "Smoking status: Every Day     Current packs/day: 1.00     Average packs/day: 1 pack/day for 39.1 years (39.1 ttl pk-yrs)     Types: Cigarettes     Start date: 1/1/1985     Passive exposure: Current    Smokeless tobacco: Never    Tobacco comments:     45 pack year history   Substance Use Topics    Alcohol use: No    Drug use: No       Physical Exam   ECOG:   ECOG SCORE    1 - Restricted in strenuous activity-ambulatory and able to carry out work of a light nature          Vitals:  /82   Pulse 93   Temp 97.5 °F (36.4 °C) (Temporal)   Ht 5' 7" (1.702 m)   Wt 104.1 kg (229 lb 8 oz)   LMP 12/05/2020 Comment: no cycle x3 years  SpO2 98%   BMI 35.94 kg/m²     Physical Exam:  Physical Exam  Constitutional:       General: She is not in acute distress.     Appearance: Normal appearance.   HENT:      Head: Normocephalic and atraumatic.   Eyes:      Extraocular Movements: Extraocular movements intact.      Conjunctiva/sclera: Conjunctivae normal.   Cardiovascular:      Rate and Rhythm: Normal rate.   Pulmonary:      Effort: Pulmonary effort is normal. No respiratory distress.   Abdominal:      Palpations: There is no hepatomegaly or splenomegaly.   Skin:     Findings: No rash.   Neurological:      General: No focal deficit present.      Mental Status: She is alert.   Psychiatric:         Mood and Affect: Mood normal.         Behavior: Behavior normal.         Thought Content: Thought content normal.          Labs   Labs:  No visits with results within 2 Day(s) from this visit.   Latest known visit with results is:   Lab Visit on 12/21/2023   Component Date Value Ref Range Status    Sodium 12/21/2023 142  136 - 145 mmol/L Final    Potassium 12/21/2023 4.4  3.5 - 5.1 mmol/L Final    Chloride 12/21/2023 103  95 - 110 mmol/L Final    CO2 12/21/2023 26  23 - 29 mmol/L Final    Glucose 12/21/2023 116 (H)  70 - 110 mg/dL Final    BUN 12/21/2023 15  6 - 20 mg/dL Final    Creatinine 12/21/2023 1.4  0.5 - 1.4 mg/dL Final    " Calcium 12/21/2023 9.9  8.7 - 10.5 mg/dL Final    Total Protein 12/21/2023 7.5  6.0 - 8.4 g/dL Final    Albumin 12/21/2023 4.0  3.5 - 5.2 g/dL Final    Total Bilirubin 12/21/2023 0.5  0.1 - 1.0 mg/dL Final    Comment: For infants and newborns, interpretation of results should be based  on gestational age, weight and in agreement with clinical  observations.    Premature Infant recommended reference ranges:  Up to 24 hours.............<8.0 mg/dL  Up to 48 hours............<12.0 mg/dL  3-5 days..................<15.0 mg/dL  6-29 days.................<15.0 mg/dL      Alkaline Phosphatase 12/21/2023 76  55 - 135 U/L Final    AST 12/21/2023 20  10 - 40 U/L Final    ALT 12/21/2023 26  10 - 44 U/L Final    eGFR 12/21/2023 44 (A)  >60 mL/min/1.73 m^2 Final    Anion Gap 12/21/2023 13  8 - 16 mmol/L Final    WBC 12/21/2023 7.83  3.90 - 12.70 K/uL Final    RBC 12/21/2023 3.74 (L)  4.00 - 5.40 M/uL Final    Hemoglobin 12/21/2023 13.4  12.0 - 16.0 g/dL Final    Hematocrit 12/21/2023 39.2  37.0 - 48.5 % Final    MCV 12/21/2023 105 (H)  82 - 98 fL Final    MCH 12/21/2023 35.8 (H)  27.0 - 31.0 pg Final    MCHC 12/21/2023 34.2  32.0 - 36.0 g/dL Final    RDW 12/21/2023 13.9  11.5 - 14.5 % Final    Platelets 12/21/2023 231  150 - 450 K/uL Final    MPV 12/21/2023 11.6  9.2 - 12.9 fL Final    Immature Granulocytes 12/21/2023 0.8 (H)  0.0 - 0.5 % Final    Gran # (ANC) 12/21/2023 5.0  1.8 - 7.7 K/uL Final    Immature Grans (Abs) 12/21/2023 0.06 (H)  0.00 - 0.04 K/uL Final    Comment: Mild elevation in immature granulocytes is non specific and   can be seen in a variety of conditions including stress response,   acute inflammation, trauma and pregnancy. Correlation with other   laboratory and clinical findings is essential.      Lymph # 12/21/2023 2.1  1.0 - 4.8 K/uL Final    Mono # 12/21/2023 0.4  0.3 - 1.0 K/uL Final    Eos # 12/21/2023 0.2  0.0 - 0.5 K/uL Final    Baso # 12/21/2023 0.06  0.00 - 0.20 K/uL Final    nRBC 12/21/2023 0  0  /100 WBC Final    Gran % 12/21/2023 63.2  38.0 - 73.0 % Final    Lymph % 12/21/2023 26.9  18.0 - 48.0 % Final    Mono % 12/21/2023 5.2  4.0 - 15.0 % Final    Eosinophil % 12/21/2023 3.1  0.0 - 8.0 % Final    Basophil % 12/21/2023 0.8  0.0 - 1.9 % Final    Differential Method 12/21/2023 Automated   Final        Imaging   CT Chest Abdomen Without Contrast (XPD)  - 01/13/2024  Details    Reading Physician Reading Date Result Priority   Jesús Traore MD  [phone] 1/13/2024 Routine     Narrative & Impression  EXAMINATION:  CT CHEST ABDOMEN WITHOUT CONTRAST (XPD)     CLINICAL HISTORY:  Secondary and unspecified malignant neoplasm of axilla and upper limb lymph nodes, metastatic breast cancer     TECHNIQUE:  Noncontrast CT scan of the chest and abdomen with reformats.  All CT scans at this facility are performed  using dose modulation techniques as appropriate to performed exam including the following:  automated exposure control; adjustment of mA and/or kV according to the patients size (this includes techniques or standardized protocols for targeted exams where dose is matched to indication/reason for exam: i.e. extremities or head);  iterative reconstruction technique.     COMPARISON:  10/31/2023 CT scan.     FINDINGS:  CT chest: Centrilobular COPD changes are noted.     There is a calcified right upper lobe granuloma.  There are multiple calcified right hilar lymph nodes.     The peripheral nodule in the right upper lobe measures 7.3 mm which appears similar to the previous study.     Right chest wall MediPort catheter is again noted.     No pleural effusion.  No pneumothorax.     CT of the abdomen: The liver demonstrates fatty infiltration.  The spleen demonstrates several calcifications.  The gallbladder is absent.  Adrenal glands appear normal.  Kidneys appear stable with small left cysts.  The aorta is calcified.  No significant retroperitoneal lymph nodes.     The pancreatic region  appears normal.  The visible bowel loops new.  Lumbar degenerative disc and facet disease is present.     Impression:     Stable exam.  7 mm right nodule, unchanged.  Calcified granuloma with calcified right hilar lymph nodes.     Fatty infiltration of the liver.  Status post cholecystectomy.       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   Dec 2020 patient had vaginal bleeding and MRI pelvis showed LAD  TB discussion 01/29/21: consensus for systemic chemotherapy given advanced cervical squamous cell carcinoma with cisplatin, paclitaxel and bevacizumab with discontinuation of palbociclib but can continue aromatase inhibitor; taxane may also be active for her concomitant metastatic breast cancer.   Renetta genetic testing negative  Treated with 6C (initiated 02/21/21) of Cis/Paclitaxel/Karina with improvement of disease and started on maintenance Karina   PET-CT January 2022 notable for uptake in cervical region concerning for oligo progression/recurrence of her disease.  Previously biopsy proven metastatic cervical cancer in lungs without evidence of recurrent mass/lymphadenopathy/avidity in this region or otherwise.  Treated with chemo-immunotherapy (C1 Pembrolizumab and Carbo/Paclitaxel)  and vaginal brachy therapy(done at [facility])  Restaging scans after this showed no evidence of recurrence and decision to hold therapy made; she was resumed on Ibrance for metastatic breast cancer  Continue with surveillance imaging       Chronic Medical Conditions  Hx of CVA June 2023  COPD  History of papillary thyroid carcinoma status post total thyroidectomy on 08/31/2017:   s/p total thyroidectomy on levothyroxine.   Hypothyroidism:    Previously on levothyroxine 275 mcg daily.    TSH elevated and T4 low today  Increase Synthroid to 300 mcg daily  Continue repeat thyroid function labs for monitoring.  Refer to endocrinology   Macrocytosis:   Possibly related to thyroid disorder will continue to monitor.  She is on vitamin-B supplement.  No new anemia.  Neuropathy:     Mild, will monitor   Tobacco abuse:    Precontemplative; continued cessation encouraged      Med Onc Chart Routing      Follow up with physician 6 weeks.   Follow up with MIGUEL    Infusion scheduling note   Port flush today and in 6 weeks   Injection scheduling note    Labs CBC, CMP, magnesium and phosphorus   Scheduling:  Preferred lab:  Lab interval:     Imaging Other and ECHO   CXR in [facility]   Pharmacy appointment    Other referrals       Additional referrals needed  sleep medicine and nutrition                 The patient was seen, interviewed and examined. Pertinent lab and radiologic studies were reviewed. Pt instructed to call should they develop concerning signs/symptoms or have further questions.        Portions of the record may have been created with voice recognition software. Occasional wrong-word or sound-a-like substitutions may have occurred due to the inherent limitations of voice recognition software. Read the chart carefully and recognize, using context, where substitutions have occurred.      Donita Nuñez MD    Hematology/Oncology

## 2024-02-15 RX ORDER — LEVOTHYROXINE SODIUM 300 UG/1
300 TABLET ORAL
Qty: 30 TABLET | Refills: 11 | Status: SHIPPED | OUTPATIENT
Start: 2024-02-15 | End: 2025-02-14

## 2024-02-16 ENCOUNTER — TELEPHONE (OUTPATIENT)
Dept: HEMATOLOGY/ONCOLOGY | Facility: CLINIC | Age: 54
End: 2024-02-16
Payer: MEDICAID

## 2024-02-16 NOTE — TELEPHONE ENCOUNTER
Nurse placed a call to patient to inform of medication changes per Dr. Ruff. Patient verbalized all understanding, and stated will pick medication up from pharmacy.

## 2024-02-16 NOTE — TELEPHONE ENCOUNTER
----- Message from Donita Ruff MD sent at 2/15/2024 12:24 PM CST -----  Regarding: Medication Change  Hey can you let her know I increased her synthroid to 300 mcg.  Her TSH is high and T4 low showing her thyroid function is not well controlled.

## 2024-03-05 ENCOUNTER — TELEPHONE (OUTPATIENT)
Dept: OTOLARYNGOLOGY | Facility: CLINIC | Age: 54
End: 2024-03-05
Payer: MEDICAID

## 2024-03-05 NOTE — TELEPHONE ENCOUNTER
----- Message from Monisha Eckert MA sent at 3/5/2024 11:34 AM CST -----  Contact: self    ----- Message -----  From: Emily Dee  Sent: 3/5/2024  11:09 AM CST  To: Master [facility] Staff    Type:  Patient Returning Call    Who Called:Josey Flores  Who Left Message for Patient:Erinn  Does the patient know what this is regarding?:unsure  Would the patient rather a call back or a response via [facility]? Call back  Best Call Back Number:[phone] ([place])   Additional Information: n/a

## 2024-03-05 NOTE — TELEPHONE ENCOUNTER
----- Message from Monisha Eckert MA sent at 3/5/2024 10:47 AM CST -----  Contact: miriam    ----- Message -----  From: Lillian Schreiber  Sent: 3/5/2024  10:35 AM CST  To: Master Gilbert Dowling is calling regarding having ear problems.  She states they hurt and draining blood.  Please give her a call back to schedule at [phone]

## 2024-03-05 NOTE — TELEPHONE ENCOUNTER
Spoke to pt and informed can be seen on 03/07/24 at 11:00 with Dr. Burgos. Voiced understanding. Appt scheduled.

## 2024-03-07 ENCOUNTER — OFFICE VISIT (OUTPATIENT)
Dept: OTOLARYNGOLOGY | Facility: CLINIC | Age: 54
End: 2024-03-07
Payer: MEDICAID

## 2024-03-07 DIAGNOSIS — H61.23 BILATERAL IMPACTED CERUMEN: ICD-10-CM

## 2024-03-07 DIAGNOSIS — H92.03 OTALGIA OF BOTH EARS: Primary | ICD-10-CM

## 2024-03-07 DIAGNOSIS — I96: ICD-10-CM

## 2024-03-07 PROCEDURE — 69210 REMOVE IMPACTED EAR WAX UNI: CPT | Mod: S$PBB,,, | Performed by: OTOLARYNGOLOGY

## 2024-03-07 PROCEDURE — 69210 REMOVE IMPACTED EAR WAX UNI: CPT | Mod: PBBFAC | Performed by: OTOLARYNGOLOGY

## 2024-03-07 PROCEDURE — 1159F MED LIST DOCD IN RCRD: CPT | Mod: CPTII,,, | Performed by: OTOLARYNGOLOGY

## 2024-03-07 PROCEDURE — 99999 PR PBB SHADOW E&M-EST. PATIENT-LVL III: CPT | Mod: PBBFAC,,, | Performed by: OTOLARYNGOLOGY

## 2024-03-07 PROCEDURE — 99213 OFFICE O/P EST LOW 20 MIN: CPT | Mod: 25,S$PBB,, | Performed by: OTOLARYNGOLOGY

## 2024-03-07 PROCEDURE — 99213 OFFICE O/P EST LOW 20 MIN: CPT | Mod: PBBFAC,25 | Performed by: OTOLARYNGOLOGY

## 2024-03-07 NOTE — PROGRESS NOTES
Referring Provider:    No referring provider defined for this encounter.  Subjective:   Patient: Josey Flores [id number], :1970   Visit date:3/7/2024 1:15 PM    Chief Complaint:  Otalgia and Ear Drainage (Pt complains that her left ear has been hurting and itchy states that one day her ear had blood come out )    HPI:    Prior notes reviewed by myself.  Clinical documentation obtained by nursing staff reviewed.     53-year-old female presents for evaluation of bilateral otalgia and muffled hearing.  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  She has been followed most recently by Dr. Wu who has been performing regular debridements and topical therapy.  Lately she has had an increase in pain mostly on the left side as well as muffled hearing bilaterally.  She has recently finished chemotherapy and is interested in more definitive or aggressive treatment options      Objective:     Physical Exam:  Vitals:  LMP 2020 Comment: no cycle x3 years  General appearance:  Well developed, well nourished    Ears:  Otoscopy of external auditory canals and tympanic membranes was significant for bilateral cerumen impaction occluding 80% of her EAC with erosion of the inferior aspect of the EAC bilaterally and granulation tissue, normal TMs, clinical speech reception thresholds grossly intact, no mass/lesion of auricle.    Nose:  No masses/lesions of external nose, nasal mucosa, septum, and turbinates were within normal limits.    Mouth:  No mass/lesion of lips, teeth, gums, hard/soft palate, tongue, tonsils, or oropharynx.    Neck & Lymphatics:  No cervical lymphadenopathy, no neck mass/crepitus/ asymmetry, trachea is midline, no thyroid enlargement/tenderness/mass.        [x]  Data Reviewed:    Lab Results   Component Value Date    WBC 7.24 2024    HGB 13.3 2024    HCT 38.7 2024     (H) 2024    EOSINOPHIL 3.2  02/13/2024       Procedure Note    CHIEF COMPLAINT:  Cerumen Impaction, osteonecrosis EAC    Description:  The patient was seated in an exam chair.  An ear speculum was placed in the right EAC and was examined under the microscope.  Suction and/or loop curettes were used to remove a large cerumen impaction.  Her external auditory canal was noted to be eroded inferiorly with some scattered granulation tissue.  We debrided this cavity bilaterally and applied ciprofloxacin/clotrimazole/dexamethasone powder to each side.  The tympanic membrane was visualized and was normal in appearance.  The procedure was repeated on the left side in a similar fashion.  The TM was intact and normal on this side as well.  The patient tolerated the procedure well.        Assessment & Plan:   Otalgia of both ears    Osteonecrosis of external ear canal, left    Bilateral impacted cerumen        Cerumen debrided from both ear canals and topical antibiotic/antifungal/steroid powder applied to inflamed area of ear canal bilaterally.  Recommended follow-up with Dr. Wu to discuss next steps    Jason Burgos M.D.  Department of Otolaryngology - Head & Neck Surgery  [id number] [place] [address].  [place] [address]  P: [phone]  F: [phone]        DISCLAIMER: This note was prepared with [employer] voice recognition transcription software. Garbled syntax, mangled pronouns, and other bizarre constructions may be attributed to that software system. While efforts were made to correct any mistakes made by this voice recognition program, some errors and/or omissions may remain in the note that were missed when the note was originally created.

## 2024-03-15 ENCOUNTER — TELEPHONE (OUTPATIENT)
Dept: INFUSION THERAPY | Facility: HOSPITAL | Age: 54
End: 2024-03-15
Payer: MEDICAID

## 2024-03-25 ENCOUNTER — APPOINTMENT (OUTPATIENT)
Dept: RADIOLOGY | Facility: HOSPITAL | Age: 54
End: 2024-03-25
Attending: INTERNAL MEDICINE
Payer: MEDICAID

## 2024-03-25 DIAGNOSIS — R05.9 COUGH, UNSPECIFIED TYPE: ICD-10-CM

## 2024-03-25 PROCEDURE — 71046 X-RAY EXAM CHEST 2 VIEWS: CPT | Mod: TC,PN

## 2024-03-25 PROCEDURE — 71046 X-RAY EXAM CHEST 2 VIEWS: CPT | Mod: 26,,, | Performed by: RADIOLOGY

## 2024-03-26 ENCOUNTER — HOSPITAL ENCOUNTER (OUTPATIENT)
Dept: CARDIOLOGY | Facility: HOSPITAL | Age: 54
Discharge: HOME | End: 2024-03-26
Attending: INTERNAL MEDICINE
Payer: MEDICAID

## 2024-03-26 ENCOUNTER — OFFICE VISIT (OUTPATIENT)
Dept: HEMATOLOGY/ONCOLOGY | Facility: CLINIC | Age: 54
End: 2024-03-26
Payer: MEDICAID

## 2024-03-26 ENCOUNTER — INFUSION (OUTPATIENT)
Dept: INFUSION THERAPY | Facility: HOSPITAL | Age: 54
End: 2024-03-26
Attending: INTERNAL MEDICINE
Payer: MEDICAID

## 2024-03-26 VITALS
OXYGEN SATURATION: 98 % | TEMPERATURE: 97 F | HEIGHT: 67 IN | WEIGHT: 232.81 LBS | BODY MASS INDEX: 36.54 KG/M2 | SYSTOLIC BLOOD PRESSURE: 100 MMHG | DIASTOLIC BLOOD PRESSURE: 68 MMHG | HEART RATE: 97 BPM

## 2024-03-26 VITALS
BODY MASS INDEX: 35.94 KG/M2 | HEART RATE: 80 BPM | SYSTOLIC BLOOD PRESSURE: 127 MMHG | HEIGHT: 67 IN | DIASTOLIC BLOOD PRESSURE: 82 MMHG | WEIGHT: 229 LBS

## 2024-03-26 VITALS
SYSTOLIC BLOOD PRESSURE: 127 MMHG | TEMPERATURE: 98 F | OXYGEN SATURATION: 98 % | DIASTOLIC BLOOD PRESSURE: 62 MMHG | HEART RATE: 101 BPM | RESPIRATION RATE: 18 BRPM

## 2024-03-26 DIAGNOSIS — C50.811 MALIGNANT NEOPLASM OF OVERLAPPING SITES OF RIGHT BREAST IN FEMALE, ESTROGEN RECEPTOR POSITIVE: ICD-10-CM

## 2024-03-26 DIAGNOSIS — Z17.0 MALIGNANT NEOPLASM OF OVERLAPPING SITES OF RIGHT BREAST IN FEMALE, ESTROGEN RECEPTOR POSITIVE: Primary | ICD-10-CM

## 2024-03-26 DIAGNOSIS — C50.919 PRIMARY MALIGNANT NEOPLASM OF BREAST WITH METASTASIS TO OTHER SITE, UNSPECIFIED LATERALITY: ICD-10-CM

## 2024-03-26 DIAGNOSIS — C77.3 MALIGNANT NEOPLASM METASTATIC TO LYMPH NODE OF AXILLA: ICD-10-CM

## 2024-03-26 DIAGNOSIS — C50.811 MALIGNANT NEOPLASM OF OVERLAPPING SITES OF RIGHT BREAST IN FEMALE, ESTROGEN RECEPTOR POSITIVE: Primary | ICD-10-CM

## 2024-03-26 DIAGNOSIS — C77.3 MALIGNANT NEOPLASM METASTATIC TO LYMPH NODE OF AXILLA: Primary | ICD-10-CM

## 2024-03-26 DIAGNOSIS — R05.9 COUGH, UNSPECIFIED TYPE: ICD-10-CM

## 2024-03-26 DIAGNOSIS — C79.51 SECONDARY CANCER OF BONE: ICD-10-CM

## 2024-03-26 DIAGNOSIS — Z17.0 MALIGNANT NEOPLASM OF OVERLAPPING SITES OF RIGHT BREAST IN FEMALE, ESTROGEN RECEPTOR POSITIVE: ICD-10-CM

## 2024-03-26 LAB
AORTIC ROOT ANNULUS: 2.85 CM
ASCENDING AORTA: 2.84 CM
AV INDEX (PROSTH): 0.8
AV MEAN GRADIENT: 3 MMHG
AV PEAK GRADIENT: 5 MMHG
AV VALVE AREA BY VELOCITY RATIO: 1.93 CM²
AV VALVE AREA: 2.14 CM²
AV VELOCITY RATIO: 0.72
BSA FOR ECHO PROCEDURE: 2.22 M2
CV ECHO LV RWT: 0.59 CM
DOP CALC AO PEAK VEL: 1.13 M/S
DOP CALC AO VTI: 19.3 CM
DOP CALC LVOT AREA: 2.7 CM2
DOP CALC LVOT DIAMETER: 1.85 CM
DOP CALC LVOT PEAK VEL: 0.81 M/S
DOP CALC LVOT STROKE VOLUME: 41.37 CM3
DOP CALC RVOT PEAK VEL: 0.62 M/S
DOP CALC RVOT VTI: 13 CM
DOP CALCLVOT PEAK VEL VTI: 15.4 CM
E WAVE DECELERATION TIME: 234.02 MSEC
E/A RATIO: 0.69
E/E' RATIO: 7.29 M/S
ECHO LV POSTERIOR WALL: 1.31 CM (ref 0.6–1.1)
FRACTIONAL SHORTENING: 26 % (ref 28–44)
INTERVENTRICULAR SEPTUM: 1.18 CM (ref 0.6–1.1)
IVRT: 85.63 MSEC
LA MAJOR: 4.18 CM
LA MINOR: 4.23 CM
LA WIDTH: 2.9 CM
LEFT ATRIUM SIZE: 3.25 CM
LEFT ATRIUM VOLUME INDEX MOD: 11.5 ML/M2
LEFT ATRIUM VOLUME INDEX: 15.7 ML/M2
LEFT ATRIUM VOLUME MOD: 24.51 CM3
LEFT ATRIUM VOLUME: 33.69 CM3
LEFT INTERNAL DIMENSION IN SYSTOLE: 3.31 CM (ref 2.1–4)
LEFT VENTRICLE DIASTOLIC VOLUME INDEX: 42.29 ML/M2
LEFT VENTRICLE DIASTOLIC VOLUME: 90.49 ML
LEFT VENTRICLE MASS INDEX: 96 G/M2
LEFT VENTRICLE SYSTOLIC VOLUME INDEX: 20.8 ML/M2
LEFT VENTRICLE SYSTOLIC VOLUME: 44.55 ML
LEFT VENTRICULAR INTERNAL DIMENSION IN DIASTOLE: 4.46 CM (ref 3.5–6)
LEFT VENTRICULAR MASS: 206.09 G
LV LATERAL E/E' RATIO: 6.89 M/S
LV SEPTAL E/E' RATIO: 7.75 M/S
LVOT MG: 1.43 MMHG
LVOT MV: 0.56 CM/S
MV PEAK A VEL: 0.9 M/S
MV PEAK E VEL: 0.62 M/S
MV STENOSIS PRESSURE HALF TIME: 67.87 MS
MV VALVE AREA P 1/2 METHOD: 3.24 CM2
PULM VEIN S/D RATIO: 1.23
PV MEAN GRADIENT: 1 MMHG
PV PEAK D VEL: 0.43 M/S
PV PEAK GRADIENT: 2 MMHG
PV PEAK S VEL: 0.53 M/S
PV PEAK VELOCITY: 0.89 M/S
RA MAJOR: 3.73 CM
RA PRESSURE ESTIMATED: 3 MMHG
RA WIDTH: 2.87 CM
RIGHT VENTRICULAR END-DIASTOLIC DIMENSION: 2.56 CM
SINUS: 2.11 CM
STJ: 2.09 CM
TDI LATERAL: 0.09 M/S
TDI SEPTAL: 0.08 M/S
TDI: 0.09 M/S
Z-SCORE OF LEFT VENTRICULAR DIMENSION IN END DIASTOLE: -4.34
Z-SCORE OF LEFT VENTRICULAR DIMENSION IN END SYSTOLE: -1.87

## 2024-03-26 PROCEDURE — 1159F MED LIST DOCD IN RCRD: CPT | Mod: CPTII,,, | Performed by: INTERNAL MEDICINE

## 2024-03-26 PROCEDURE — 93306 TTE W/DOPPLER COMPLETE: CPT | Mod: 26,,, | Performed by: INTERNAL MEDICINE

## 2024-03-26 PROCEDURE — 25000003 PHARM REV CODE 250

## 2024-03-26 PROCEDURE — 96523 IRRIG DRUG DELIVERY DEVICE: CPT

## 2024-03-26 PROCEDURE — 3008F BODY MASS INDEX DOCD: CPT | Mod: CPTII,,, | Performed by: INTERNAL MEDICINE

## 2024-03-26 PROCEDURE — 99999 PR PBB SHADOW E&M-EST. PATIENT-LVL V: CPT | Mod: PBBFAC,,, | Performed by: INTERNAL MEDICINE

## 2024-03-26 PROCEDURE — 99215 OFFICE O/P EST HI 40 MIN: CPT | Mod: PBBFAC,25 | Performed by: INTERNAL MEDICINE

## 2024-03-26 PROCEDURE — 3074F SYST BP LT 130 MM HG: CPT | Mod: CPTII,,, | Performed by: INTERNAL MEDICINE

## 2024-03-26 PROCEDURE — 93306 TTE W/DOPPLER COMPLETE: CPT

## 2024-03-26 PROCEDURE — 99213 OFFICE O/P EST LOW 20 MIN: CPT | Mod: S$PBB,,, | Performed by: INTERNAL MEDICINE

## 2024-03-26 PROCEDURE — 3078F DIAST BP <80 MM HG: CPT | Mod: CPTII,,, | Performed by: INTERNAL MEDICINE

## 2024-03-26 PROCEDURE — 63600175 PHARM REV CODE 636 W HCPCS

## 2024-03-26 PROCEDURE — A4216 STERILE WATER/SALINE, 10 ML: HCPCS

## 2024-03-26 RX ORDER — HEPARIN 100 UNIT/ML
500 SYRINGE INTRAVENOUS
Status: CANCELLED | OUTPATIENT
Start: 2024-03-26

## 2024-03-26 RX ORDER — HEPARIN 100 UNIT/ML
500 SYRINGE INTRAVENOUS
Status: DISCONTINUED | OUTPATIENT
Start: 2024-03-26 | End: 2024-03-26 | Stop reason: HOSPADM

## 2024-03-26 RX ORDER — SODIUM CHLORIDE 0.9 % (FLUSH) 0.9 %
10 SYRINGE (ML) INJECTION
Status: CANCELLED | OUTPATIENT
Start: 2024-03-26

## 2024-03-26 RX ORDER — BENZONATATE 200 MG/1
200 CAPSULE ORAL 3 TIMES DAILY PRN
Qty: 90 CAPSULE | Refills: 1 | Status: SHIPPED | OUTPATIENT
Start: 2024-03-26 | End: 2024-05-25

## 2024-03-26 RX ORDER — SODIUM CHLORIDE 0.9 % (FLUSH) 0.9 %
10 SYRINGE (ML) INJECTION
Status: DISCONTINUED | OUTPATIENT
Start: 2024-03-26 | End: 2024-03-26 | Stop reason: HOSPADM

## 2024-03-26 RX ADMIN — Medication 10 ML: at 09:03

## 2024-03-26 RX ADMIN — HEPARIN 500 UNITS: 100 SYRINGE at 09:03

## 2024-03-26 NOTE — NURSING
"Pt here for Mediport Flush. Right chest wall mediport accessed with a 20g 1" tomlin via sterile technique.  Excellent blood return noted.  Flushed with 10ml NS and 5 ml heparin solution.  Needle D/C, site without redness, swelling, or drainage noted.  Dressing applied.  Patient tolerated well.  Patient to return to clinic in 6-8 weeks     "

## 2024-03-26 NOTE — PROGRESS NOTES
[facility]  [address] [address]  Phone: [phone];  Fax: [phone]    Patient ID: Josey Flores   Chief Complaint: Follow-up  MRN:  [id number]     Oncologic Diagnosis:    - Stage IV cervical squamous cell carcinoma  - Stage IV, T2 N1b M1, invasive breast carcinoma, ER/CO+, HER2-  - Papillary thyroid carcinoma status post total thyroidectomy on 08/31/2017, mpT1b N0   - Cervical HSIL MIREILLE 3 s/p LEEP, 2017  Previous Treatment:    - Palliative chemotherapy: cisplatin, paclitaxel and bevacizumab; 02/22/2021 cycle 1 day 1 -> bevacizumab maintenance after 6 cycles  - Carboplatin, paclitaxel and pembrolizumab, C1D1 1/28/22; maintenance pembrolizumab, d/c 9/2022  Current Treatment:    - Letrozole and palbociclib   Subjective   Josey Flores is a 53 y.o. female who presents to clinic for follow up and is accompanied by her spouse.      The patient reports that she is compliant with levothyroxine.  She continues to have severe pain.  One month ago she was weaned off of her pain medication and antidepressants.  Discussed pain management referral and she would like to see them.      The remainder of her symptoms remain unchanged.    Review of Systems:  Review of Systems   Constitutional:  Positive for appetite change (decreased). Negative for activity change, chills, diaphoresis, fatigue, fever and unexpected weight change.   HENT:  Negative for nosebleeds.    Respiratory:  Positive for cough (mostly when laying down). Negative for shortness of breath.    Cardiovascular:  Negative for chest pain.   Gastrointestinal:  Positive for abdominal pain. Negative for abdominal distention, anal bleeding, blood in stool, constipation, diarrhea, nausea and vomiting.   Genitourinary:  Negative for difficulty urinating and hematuria.   Musculoskeletal:  Positive for arthralgias. Negative for back pain and myalgias.   Skin:  Negative for rash.   Neurological:  Negative for  dizziness, weakness, light-headedness and headaches.   Hematological:  Does not bruise/bleed easily.   Psychiatric/Behavioral:  Positive for sleep disturbance (due to pain). The patient is not nervous/anxious.      History     Oncology History   Secondary cancer of bone    Chemotherapy    Treatment Summary   Plan Name: OP GYN PACLITAXEL CISPLATIN BEVACIZUMAB Q3W  Treatment Goal: Control  Status: Inactive  Start Date: 2/22/2021  End Date: 7/7/2021  Provider: Estefani Asencio MD  Chemotherapy: CISplatin (PLATINOL) 50 mg/m2 = 108 mg in sodium chloride 0.9% 608 mL chemo infusion, 50 mg/m2 = 108 mg, Intravenous, Clinic/HOD 1 time, 6 of 6 cycles  Administration: 108 mg (2/22/2021), 108 mg (3/15/2021), 108 mg (4/6/2021), 108 mg (5/11/2021), 108 mg (6/1/2021), 108 mg (6/22/2021)    bevacizumab (AVASTIN) 15 mg/kg = 1,480 mg in sodium chloride 0.9% 100 mL chemo infusion, 15 mg/kg = 1,480 mg, Intravenous, Clinic/HOD 1 time, 6 of 6 cycles  Administration: 1,480 mg (4/6/2021), 1,480 mg (2/23/2021), 1,480 mg (3/16/2021), 1,480 mg (6/1/2021), 1,480 mg (6/22/2021), 1,480 mg (5/12/2021)    PACLitaxeL (TAXOL) 175 mg/m2 = 378 mg in sodium chloride 0.9% 563 mL chemo infusion, 175 mg/m2 = 378 mg, Intravenous, Clinic/HOD 1 time, 6 of 6 cycles  Administration: 378 mg (2/22/2021), 378 mg (3/15/2021), 378 mg (4/6/2021), 378 mg (5/11/2021), 378 mg (6/1/2021), 378 mg (6/22/2021)    Plan Name: OP BEVACIZUMAB Q3W   Treatment Goal: Maintenance  Status: Inactive  Start Date: 7/13/2021  End Date: 12/28/2021  Provider: Estefani Asencio MD  Chemotherapy: bevacizumab (AVASTIN) 1,300 mg in sodium chloride 0.9% 100 mL chemo infusion, 1,345 mg, Intravenous, [facility] 1 time, 9 of 17 cycles  Administration: 1,300 mg (7/13/2021), 1,345 mg (8/3/2021), 1,300 mg (8/24/2021), 1,300 mg (9/14/2021), 1,345 mg (10/5/2021), 1,345 mg (10/26/2021), 1,345 mg (11/16/2021), 1,300 mg (12/7/2021), 1,300 mg (12/28/2021)    Plan Name: OP CARBOPLATIN PACLITAXEL  PEMBROLIZUMAB Q3W FOLLOWED BY MAINTENANCE PEMBROLIZUMAB 400 MG Q6W  Treatment Goal: Control  Status: Inactive  Start Date: 1/28/2022  End Date: 8/11/2022  Provider: Estefani Asencio MD  Chemotherapy: CARBOplatin (PARAPLATIN) 425 mg in sodium chloride 0.9% 292.5 mL chemo infusion, 425 mg, Intravenous, Clinic/HOD 1 time, 6 of 6 cycles  Dose modification:   (original dose 424 mg, Cycle 6),   (original dose 424 mg, Cycle 6)  Administration: 425 mg (1/28/2022), 495 mg (2/18/2022), 525 mg (6/2/2022), 525 mg (5/12/2022), 525 mg (3/11/2022), 495 mg (4/11/2022)    PACLitaxeL (TAXOL) 175 mg/m2 = 366 mg in sodium chloride 0.9% 561 mL chemo infusion, 175 mg/m2 = 366 mg (100 % of original dose 175 mg/m2), Intravenous, Clinic/HOD 1 time, 6 of 6 cycles  Dose modification: 175 mg/m2 (original dose 175 mg/m2, Cycle 1), 175 mg/m2 (original dose 175 mg/m2, Cycle 4)  Administration: 366 mg (1/28/2022), 366 mg (2/18/2022), 366 mg (3/11/2022), 366 mg (4/11/2022), 366 mg (5/12/2022), 366 mg (6/2/2022)       Malignant neoplasm of endocervix   9/10/2020 Imaging Significant Findings    CT A/P: Negative for ascites, omental caking, lymphadenopathy, or a gross cervical mass     12/7/2020 Biopsy    EMBX: SCC, p16+     1/12/2021 Imaging Significant Findings    MRI Pelvis w/ w/o  4.9 cm cervical mass  R parametrial invasion  R external iliac lymphadenopathy     1/27/2021 Imaging Significant Findings    PET/CT: FDG avidity in the chest     2/18/2021 Biopsy    Endoscopic biopsy of the lung: +      2/22/2021 - 6/29/2021 Chemotherapy    Cisplatin/paclitaxel/avastin x6     3/12/2021 Genetic Testing    STRATA: FRANK, PIK3c, Ep300, ERBB3, KDM6A     4/24/2021 Imaging Significant Findings    CT Pelvis w/: JUAN FRANCISCO with a normal cervix     4/26/2021 Imaging Significant Findings    PET/CT: No FDG avidity     5/18/2021 Imaging Significant Findings    CT A/P w/: JUAN FRANCISCO     6/5/2021 Imaging Significant Findings    CT A/P w/: JUAN FRANCISCO     7/12/2021 Imaging Significant  Findings    PET/CT: JUAN FRANCISCO     7/13/2021 - 12/28/2021 Maintenance Therapy    Avastin x 9     10/20/2021 Imaging Significant Findings    PET/CT: JUAN FRANCISCO     1/11/2022 Imaging Significant Findings    PET/CT: Increased FDG avidity in the cervix.  No other evidence of disease.     1/28/2022 - 6/2/2022 Chemotherapy    Carboplatin/paclitaxel/pembrolizumab x 6     3/25/2022 Imaging Significant Findings    PET/CT: Persistent FDG avidity in the cervix.  New FDG avidity in the inguinal lymph nodes (although it does not look suspicious)     4/25/2022 - 4/29/2022 Radiation Therapy    Treating physician: Dr. Vázquez (MRI guided T&O)  Total Dose: 30 Gy  Fractions: 5     6/30/2022 - 8/11/2022 Maintenance Therapy    Pembrolizumab 400 mg IV x2     8/5/2022 Imaging Significant Findings    PET/CT: JUAN FRANCISCO     10/16/2022 Imaging Significant Findings    CT A/P w/ (Women's without imaging available): JUAN FRANCISCO     1/11/2023 Imaging Significant Findings    PET/CT: JUAN FRANCISCO      Chemotherapy    Treatment Summary   Plan Name: OP GYN PACLITAXEL CISPLATIN BEVACIZUMAB Q3W  Treatment Goal: Control  Status: Inactive  Start Date: 2/22/2021  End Date: 7/7/2021  Provider: Estefani Asencio MD  Chemotherapy: CISplatin (PLATINOL) 50 mg/m2 = 108 mg in sodium chloride 0.9% 608 mL chemo infusion, 50 mg/m2 = 108 mg, Intravenous, Clinic/HOD 1 time, 6 of 6 cycles  Administration: 108 mg (2/22/2021), 108 mg (3/15/2021), 108 mg (4/6/2021), 108 mg (5/11/2021), 108 mg (6/1/2021), 108 mg (6/22/2021)    bevacizumab (AVASTIN) 15 mg/kg = 1,480 mg in sodium chloride 0.9% 100 mL chemo infusion, 15 mg/kg = 1,480 mg, Intravenous, Clinic/HOD 1 time, 6 of 6 cycles  Administration: 1,480 mg (4/6/2021), 1,480 mg (2/23/2021), 1,480 mg (3/16/2021), 1,480 mg (6/1/2021), 1,480 mg (6/22/2021), 1,480 mg (5/12/2021)    PACLitaxeL (TAXOL) 175 mg/m2 = 378 mg in sodium chloride 0.9% 563 mL chemo infusion, 175 mg/m2 = 378 mg, Intravenous, [facility] 1 time, 6 of 6 cycles  Administration: 378 mg  (2/22/2021), 378 mg (3/15/2021), 378 mg (4/6/2021), 378 mg (5/11/2021), 378 mg (6/1/2021), 378 mg (6/22/2021)    Plan Name: OP BEVACIZUMAB Q3W   Treatment Goal: Maintenance  Status: Inactive  Start Date: 7/13/2021  End Date: 12/28/2021  Provider: Estefani Asencio MD  Chemotherapy: bevacizumab (AVASTIN) 1,300 mg in sodium chloride 0.9% 100 mL chemo infusion, 1,345 mg, Intravenous, Clinic/HOD 1 time, 9 of 17 cycles  Administration: 1,300 mg (7/13/2021), 1,345 mg (8/3/2021), 1,300 mg (8/24/2021), 1,300 mg (9/14/2021), 1,345 mg (10/5/2021), 1,345 mg (10/26/2021), 1,345 mg (11/16/2021), 1,300 mg (12/7/2021), 1,300 mg (12/28/2021)    Plan Name: OP CARBOPLATIN PACLITAXEL PEMBROLIZUMAB Q3W FOLLOWED BY MAINTENANCE PEMBROLIZUMAB 400 MG Q6W  Treatment Goal: Control  Status: Inactive  Start Date: 1/28/2022  End Date: 8/11/2022  Provider: Estefani Asencio MD  Chemotherapy: CARBOplatin (PARAPLATIN) 425 mg in sodium chloride 0.9% 292.5 mL chemo infusion, 425 mg, Intravenous, Clinic/HOD 1 time, 6 of 6 cycles  Dose modification:   (original dose 424 mg, Cycle 6),   (original dose 424 mg, Cycle 6)  Administration: 425 mg (1/28/2022), 495 mg (2/18/2022), 525 mg (6/2/2022), 525 mg (5/12/2022), 525 mg (3/11/2022), 495 mg (4/11/2022)    PACLitaxeL (TAXOL) 175 mg/m2 = 366 mg in sodium chloride 0.9% 561 mL chemo infusion, 175 mg/m2 = 366 mg (100 % of original dose 175 mg/m2), Intravenous, Clinic/HOD 1 time, 6 of 6 cycles  Dose modification: 175 mg/m2 (original dose 175 mg/m2, Cycle 1), 175 mg/m2 (original dose 175 mg/m2, Cycle 4)  Administration: 366 mg (1/28/2022), 366 mg (2/18/2022), 366 mg (3/11/2022), 366 mg (4/11/2022), 366 mg (5/12/2022), 366 mg (6/2/2022)             Past Medical History:   Diagnosis Date    Anxiety     Asthma     Breast cancer 2017    Cancer     right breast, metastatic-lymph nodes, bone, and thyroid     COPD (chronic obstructive pulmonary disease)     Depression     History of psychiatric hospitalization      2000; suicidal ideation    Hx of psychiatric care     Hypothyroidism     Miscarriage     five miscarriages    Obesity     Psychiatric problem     Thyroid cancer 2017       Past Surgical History:   Procedure Laterality Date    ABSCESS DRAINAGE      bilateral axilla    ADENOIDECTOMY      APPENDECTOMY      BREAST BIOPSY Right     x3    CHOLECYSTECTOMY      ENDOBRONCHIAL ULTRASOUND Bilateral 02/18/2021    Procedure: ENDOBRONCHIAL ULTRASOUND (EBUS);  Surgeon: Jaron Ni MD;  Location: [facility] ENDO;  Service: Pulmonary;  Laterality: Bilateral;    FLUOROSCOPY N/A 01/28/2021    Procedure: Mediport placement;  Surgeon: Noah Phelan MD;  Location: [facility] CATH LAB;  Service: General;  Laterality: N/A;    MASS EXCISION      MEDIPORT INSERTION, SINGLE Right 02/2021    SKIN GRAFT  06/16/2017    THYROIDECTOMY Bilateral     TONSILLECTOMY         Family History   Problem Relation Age of Onset    Arthritis Mother     Early death Mother         64 at time of death    Heart attack Mother     Heart disease Mother     Heart failure Mother     Hypertension Mother     Coronary artery disease Father     Hearing loss Father     Heart disease Father     Hyperlipidemia Father     Hypertension Father     Mental illness Father     Learning disabilities Son     Cancer Maternal Aunt        Review of patient's allergies indicates:   Allergen Reactions    Gabapentin Swelling     Note: unilateral joint edema, unclear if due to gabapentin    Nsaids (non-steroidal anti-inflammatory drug) Other (See Comments)     Instructed not to take NSAID drugs with chemo pill.    Zometa [zoledronic acid] Other (See Comments)     Possible osteonecrosis jaw    Clindamycin Rash    Vancomycin analogues Itching       Social History     Tobacco Use    Smoking status: Every Day     Current packs/day: 1.00     Average packs/day: 1 pack/day for 39.2 years (39.2 ttl pk-yrs)     Types: Cigarettes     Start date: 1/1/1985     Passive exposure: Current    Smokeless  "tobacco: Never    Tobacco comments:     45 pack year history   Substance Use Topics    Alcohol use: No    Drug use: No       Physical Exam   ECOG:   ECOG SCORE    1 - Restricted in strenuous activity-ambulatory and able to carry out work of a light nature          Vitals:  /68   Pulse 97   Temp 97.4 °F (36.3 °C) (Temporal)   Ht 5' 7" (1.702 m)   Wt 105.6 kg (232 lb 12.9 oz)   LMP 12/05/2020 Comment: no cycle x3 years  SpO2 98%   BMI 36.46 kg/m²     Physical Exam:  Physical Exam  Constitutional:       General: She is not in acute distress.     Appearance: Normal appearance.   HENT:      Head: Normocephalic and atraumatic.   Eyes:      Extraocular Movements: Extraocular movements intact.      Conjunctiva/sclera: Conjunctivae normal.   Cardiovascular:      Rate and Rhythm: Normal rate.   Pulmonary:      Effort: Pulmonary effort is normal. No respiratory distress.   Abdominal:      Palpations: There is no hepatomegaly or splenomegaly.   Skin:     Findings: No rash.   Neurological:      General: No focal deficit present.      Mental Status: She is alert.   Psychiatric:         Mood and Affect: Mood normal.         Behavior: Behavior normal.         Thought Content: Thought content normal.          Labs   Labs:  Hospital Outpatient Visit on 03/26/2024   Component Date Value Ref Range Status    RA Width 03/26/2024 2.87  cm Final    LA volume (mod) 03/26/2024 24.51  cm3 Final    Left Atrium Major Axis 03/26/2024 4.18  cm Final    Left Atrium Minor Axis 03/26/2024 4.23  cm Final    RA Major Axis 03/26/2024 3.73  cm Final    LV Diastolic Volume 03/26/2024 90.49  mL Final    LV Systolic Volume 03/26/2024 44.55  mL Final    PV Peak D Yfn 03/26/2024 0.43  m/s Final    PV Peak S Yfn 03/26/2024 0.53  m/s Final    MV Peak A Yfn 03/26/2024 0.90  m/s Final    MV stenosis pressure 1/2 time 03/26/2024 67.87  ms Final    MV Peak E Yfn 03/26/2024 0.62  m/s Final    PV mean gradient 03/26/2024 1  mmHg Final    RVOT peak VTI " 03/26/2024 13.0  cm Final    RVOT peak bonifacio 03/26/2024 0.62  m/s Final    Ao VTI 03/26/2024 19.30  cm Final    Ao peak bonifacio 03/26/2024 1.13  m/s Final    LVOT peak VTI 03/26/2024 15.40  cm Final    LVOT peak bonifacio 03/26/2024 0.81  m/s Final    LVOT diameter 03/26/2024 1.85  cm Final    IVRT 03/26/2024 85.63  msec Final    E wave deceleration time 03/26/2024 234.02  msec Final    PV peak gradient 03/26/2024 2  mmHg Final    AV mean gradient 03/26/2024 3  mmHg Final    RVDD 03/26/2024 2.56  cm Final    LA size 03/26/2024 3.25  cm Final    Ascending aorta 03/26/2024 2.84  cm Final    STJ 03/26/2024 2.09  cm Final    Sinus 03/26/2024 2.11  cm Final    LVIDs 03/26/2024 3.31  2.1 - 4.0 cm Final    Ao root annulus 03/26/2024 2.85  cm Final    Posterior Wall 03/26/2024 1.31 (A)  0.6 - 1.1 cm Final    IVS 03/26/2024 1.18 (A)  0.6 - 1.1 cm Final    LVIDd 03/26/2024 4.46  3.5 - 6.0 cm Final    PV PEAK VELOCITY 03/26/2024 0.89  m/s Final    TDI LATERAL 03/26/2024 0.09  m/s Final    Left Ventricular Outflow Tract Kayla* 03/26/2024 1.43  mmHg Final    Left Ventricular Outflow Tract Kayla* 03/26/2024 0.56  cm/s Final    TDI SEPTAL 03/26/2024 0.08  m/s Final    LV LATERAL E/E' RATIO 03/26/2024 6.89  m/s Final    LV SEPTAL E/E' RATIO 03/26/2024 7.75  m/s Final    FS 03/26/2024 26 (A)  28 - 44 % Final    LV mass 03/26/2024 206.09  g Final    ZLVIDD 03/26/2024 -4.34   Final    ZLVIDS 03/26/2024 -1.87   Final    Left Ventricle Relative Wall Thick* 03/26/2024 0.59  cm Final    AV valve area 03/26/2024 2.14  cm² Final    AV Velocity Ratio 03/26/2024 0.72   Final    AV index (prosthetic) 03/26/2024 0.80   Final    MV valve area p 1/2 method 03/26/2024 3.24  cm2 Final    E/A ratio 03/26/2024 0.69   Final    Mean e' 03/26/2024 0.09  m/s Final    Pulm vein S/D ratio 03/26/2024 1.23   Final    LVOT area 03/26/2024 2.7  cm2 Final    LVOT stroke volume 03/26/2024 41.37  cm3 Final    AV peak gradient 03/26/2024 5  mmHg Final    E/E' ratio 03/26/2024  7.29  m/s Final    LV Systolic Volume Index 03/26/2024 20.8  mL/m2 Final    LV Diastolic Volume Index 03/26/2024 42.29  mL/m2 Final    LV Mass Index 03/26/2024 96  g/m2 Final    LA Volume Index (Mod) 03/26/2024 11.5  mL/m2 Final    RADHA by Velocity Ratio 03/26/2024 1.93  cm² Final    BSA 03/26/2024 2.22  m2 Final    LA Volume Index 03/26/2024 15.7  mL/m2 Final    LA volume 03/26/2024 33.69  cm3 Final    LA WIDTH 03/26/2024 2.9  cm Final    Est. RA pres 03/26/2024 3  mmHg Final   Lab Visit on 03/26/2024   Component Date Value Ref Range Status    Phosphorus 03/26/2024 3.5  2.7 - 4.5 mg/dL Final    Magnesium 03/26/2024 1.8  1.6 - 2.6 mg/dL Final    Sodium 03/26/2024 141  136 - 145 mmol/L Final    Potassium 03/26/2024 3.8  3.5 - 5.1 mmol/L Final    Chloride 03/26/2024 106  95 - 110 mmol/L Final    CO2 03/26/2024 25  23 - 29 mmol/L Final    Glucose 03/26/2024 133 (H)  70 - 110 mg/dL Final    BUN 03/26/2024 12  6 - 20 mg/dL Final    Creatinine 03/26/2024 1.5 (H)  0.5 - 1.4 mg/dL Final    Calcium 03/26/2024 9.2  8.7 - 10.5 mg/dL Final    Total Protein 03/26/2024 6.8  6.0 - 8.4 g/dL Final    Albumin 03/26/2024 3.5  3.5 - 5.2 g/dL Final    Total Bilirubin 03/26/2024 0.4  0.1 - 1.0 mg/dL Final    Comment: For infants and newborns, interpretation of results should be based  on gestational age, weight and in agreement with clinical  observations.    Premature Infant recommended reference ranges:  Up to 24 hours.............<8.0 mg/dL  Up to 48 hours............<12.0 mg/dL  3-5 days..................<15.0 mg/dL  6-29 days.................<15.0 mg/dL      Alkaline Phosphatase 03/26/2024 74  55 - 135 U/L Final    AST 03/26/2024 17  10 - 40 U/L Final    ALT 03/26/2024 26  10 - 44 U/L Final    eGFR 03/26/2024 41 (A)  >60 mL/min/1.73 m^2 Final    Anion Gap 03/26/2024 10  8 - 16 mmol/L Final    WBC 03/26/2024 8.98  3.90 - 12.70 K/uL Final    RBC 03/26/2024 3.39 (L)  4.00 - 5.40 M/uL Final    Hemoglobin 03/26/2024 12.1  12.0 - 16.0 g/dL  Final    Hematocrit 03/26/2024 35.8 (L)  37.0 - 48.5 % Final    MCV 03/26/2024 106 (H)  82 - 98 fL Final    MCH 03/26/2024 35.7 (H)  27.0 - 31.0 pg Final    MCHC 03/26/2024 33.8  32.0 - 36.0 g/dL Final    RDW 03/26/2024 14.0  11.5 - 14.5 % Final    Platelets 03/26/2024 235  150 - 450 K/uL Final    MPV 03/26/2024 11.7  9.2 - 12.9 fL Final    Immature Granulocytes 03/26/2024 1.3 (H)  0.0 - 0.5 % Final    Gran # (ANC) 03/26/2024 5.5  1.8 - 7.7 K/uL Final    Immature Grans (Abs) 03/26/2024 0.12 (H)  0.00 - 0.04 K/uL Final    Comment: Mild elevation in immature granulocytes is non specific and   can be seen in a variety of conditions including stress response,   acute inflammation, trauma and pregnancy. Correlation with other   laboratory and clinical findings is essential.      Lymph # 03/26/2024 2.5  1.0 - 4.8 K/uL Final    Mono # 03/26/2024 0.6  0.3 - 1.0 K/uL Final    Eos # 03/26/2024 0.3  0.0 - 0.5 K/uL Final    Baso # 03/26/2024 0.07  0.00 - 0.20 K/uL Final    nRBC 03/26/2024 0  0 /100 WBC Final    Gran % 03/26/2024 60.8  38.0 - 73.0 % Final    Lymph % 03/26/2024 27.4  18.0 - 48.0 % Final    Mono % 03/26/2024 6.8  4.0 - 15.0 % Final    Eosinophil % 03/26/2024 2.9  0.0 - 8.0 % Final    Basophil % 03/26/2024 0.8  0.0 - 1.9 % Final    Differential Method 03/26/2024 Automated   Final    LD 03/26/2024 205  110 - 260 U/L Final    Results are increased in hemolyzed samples.    TSH 03/26/2024 2.144  0.400 - 4.000 uIU/mL Final    Free T4 03/26/2024 0.92  0.71 - 1.51 ng/dL Final        Imaging   CT Chest Abdomen Without Contrast (XPD)  - 01/13/2024  Details    Reading Physician Reading Date Result Priority   Jesús Traore MD  [phone]  [phone] 1/13/2024 Routine     Narrative & Impression  EXAMINATION:  CT CHEST ABDOMEN WITHOUT CONTRAST (XPD)     CLINICAL HISTORY:  Secondary and unspecified malignant neoplasm of axilla and upper limb lymph nodes, metastatic breast cancer     TECHNIQUE:  Noncontrast CT scan of  the chest and abdomen with reformats.  All CT scans at this facility are performed  using dose modulation techniques as appropriate to performed exam including the following:  automated exposure control; adjustment of mA and/or kV according to the patients size (this includes techniques or standardized protocols for targeted exams where dose is matched to indication/reason for exam: i.e. extremities or head);  iterative reconstruction technique.     COMPARISON:  10/31/2023 CT scan.     FINDINGS:  CT chest: Centrilobular COPD changes are noted.     There is a calcified right upper lobe granuloma.  There are multiple calcified right hilar lymph nodes.     The peripheral nodule in the right upper lobe measures 7.3 mm which appears similar to the previous study.     Right chest wall MediPort catheter is again noted.     No pleural effusion.  No pneumothorax.     CT of the abdomen: The liver demonstrates fatty infiltration.  The spleen demonstrates several calcifications.  The gallbladder is absent.  Adrenal glands appear normal.  Kidneys appear stable with small left cysts.  The aorta is calcified.  No significant retroperitoneal lymph nodes.     The pancreatic region appears normal.  The visible bowel loops new.  Lumbar degenerative disc and facet disease is present.     Impression:     Stable exam.  7 mm right nodule, unchanged.  Calcified granuloma with calcified right hilar lymph nodes.     Fatty infiltration of the liver.  Status post cholecystectomy.       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   Dec 2020 patient had vaginal bleeding and MRI pelvis showed LAD  TB discussion 01/29/21: consensus for systemic  chemotherapy given advanced cervical squamous cell carcinoma with cisplatin, paclitaxel and bevacizumab with discontinuation of palbociclib but can continue aromatase inhibitor; taxane may also be active for her concomitant metastatic breast cancer.   Renetta genetic testing negative  Treated with 6C (initiated 02/21/21) of Cis/Paclitaxel/Karina with improvement of disease and started on maintenance Karina   PET-CT January 2022 notable for uptake in cervical region concerning for oligo progression/recurrence of her disease.  Previously biopsy proven metastatic cervical cancer in lungs without evidence of recurrent mass/lymphadenopathy/avidity in this region or otherwise.  Treated with chemo-immunotherapy (C1 Pembrolizumab and Carbo/Paclitaxel)  and vaginal brachy therapy([facility])  Restaging scans after this showed no evidence of recurrence and decision to hold therapy made; she was resumed on Ibrance for metastatic breast cancer  Continue with surveillance imaging       Chronic Medical Conditions  Hx of CVA June 2023  COPD  History of papillary thyroid carcinoma status post total thyroidectomy on 08/31/2017:   s/p total thyroidectomy on levothyroxine.   Hypothyroidism:   Previously on levothyroxine 275 mcg daily.    TSH elevated and T4 low today  Increased Synthroid to 300 mcg daily and TFTs improved.  Continue Synthroid 300 mcg daily  Continue repeat thyroid function labs for monitoring.  Refer to endocrinology   Macrocytosis:   Possibly related to thyroid disorder will continue to monitor.  She is on vitamin-B supplement.  No new anemia.  Neuropathy:     Mild, will monitor   Tobacco abuse:    Precontemplative; continued cessation encouraged      Med Onc Chart Routing      Follow up with physician 4 weeks.   Follow up with MIGU[facility]    Infusion scheduling note    Injection scheduling note    Labs CBC, CMP, phosphorus, magnesium, TSH, LDH and free T4   Scheduling:  Preferred lab:  Lab interval:     Imaging CT chest abdomen  pelvis   please schedule 1 week prior to appointment   Pharmacy appointment    Other referrals                      The patient was seen, interviewed and examined. Pertinent lab and radiologic studies were reviewed. Pt instructed to call should they develop concerning signs/symptoms or have further questions.        Portions of the record may have been created with voice recognition software. Occasional wrong-word or sound-a-like substitutions may have occurred due to the inherent limitations of voice recognition software. Read the chart carefully and recognize, using context, where substitutions have occurred.      Donita Nuñez MD    Hematology/Oncology

## 2024-03-27 ENCOUNTER — TELEPHONE (OUTPATIENT)
Dept: HEMATOLOGY/ONCOLOGY | Facility: CLINIC | Age: 54
End: 2024-03-27
Payer: MEDICAID

## 2024-03-27 NOTE — TELEPHONE ENCOUNTER
3/20/2024    10:39 AM 2/13/2024     8:12 AM 2/11/2024    11:47 PM 1/11/2024    10:44 AM 12/21/2023     1:24 PM 12/14/2023     1:00 PM 11/30/2023    11:08 AM   DISTRESS SCREENING   Distress Score 5 2 3 8 0 - No Distress 4 0 - No Distress   Practical Concerns None of these None of these None of these None of these None of these None of these    Social Concerns None of these  None of these  None of these None of these    Emotional Concerns Worry or anxiety;Sadness or depression Sadness or depression Worry or anxiety;Sadness or depression Worry or anxiety;Sadness or depression;Grief or loss None of these Depression;Nervousness    Retire Spiritual or Taoist Concerns     No No    Spiritual or Taoist Concerns None of these None of these None of these None of these      Physical Concerns Pain;Memory or concentration Pain;Sleep;Fatigue;Memory or concentration;Changes in eating;Loss or change of physical abilities Pain;Sleep;Memory or concentration;Changes in eating Pain None of these Diarrhea;Pain      SW intern conducted distress review due to distress score of 5 for emotional and physical concerns. Pt reported no concerns at this time and SW intern encouraged pt to contact SS PRN. Pt denied any questions or concerns. SW intern will remain available.

## 2024-04-15 ENCOUNTER — HOSPITAL ENCOUNTER (OUTPATIENT)
Dept: RADIOLOGY | Facility: HOSPITAL | Age: 54
Discharge: HOME | End: 2024-04-15
Attending: INTERNAL MEDICINE
Payer: MEDICAID

## 2024-04-15 DIAGNOSIS — C77.3 MALIGNANT NEOPLASM METASTATIC TO LYMPH NODE OF AXILLA: ICD-10-CM

## 2024-04-15 PROCEDURE — 74176 CT ABD & PELVIS W/O CONTRAST: CPT | Mod: 26,,, | Performed by: RADIOLOGY

## 2024-04-15 PROCEDURE — 71250 CT THORAX DX C-: CPT | Mod: 26,,, | Performed by: RADIOLOGY

## 2024-04-15 PROCEDURE — 74176 CT ABD & PELVIS W/O CONTRAST: CPT | Mod: TC

## 2024-04-24 ENCOUNTER — TELEPHONE (OUTPATIENT)
Dept: INFUSION THERAPY | Facility: HOSPITAL | Age: 54
End: 2024-04-24
Payer: MEDICAID

## 2024-04-24 NOTE — TELEPHONE ENCOUNTER
----- Message from Louann Joseph LPN sent at 4/23/2024  4:20 PM CDT -----  Contact: [phone]  Can you please schedule patient with next available at the [facility]; I am unable to schedule. Thanks  ----- Message -----  From: Sancho Noguera  Sent: 4/23/2024  10:42 AM CDT  To: [facility] Staff    Patient needs first available appointment due to 4 wk f/u and lab . Please call patient at [phone] . Thanks KB

## 2024-04-25 ENCOUNTER — LAB VISIT (OUTPATIENT)
Dept: LAB | Facility: HOSPITAL | Age: 54
End: 2024-04-25
Attending: INTERNAL MEDICINE
Payer: MEDICAID

## 2024-04-25 ENCOUNTER — OFFICE VISIT (OUTPATIENT)
Dept: HEMATOLOGY/ONCOLOGY | Facility: CLINIC | Age: 54
End: 2024-04-25
Payer: MEDICAID

## 2024-04-25 VITALS
TEMPERATURE: 98 F | WEIGHT: 234.13 LBS | OXYGEN SATURATION: 100 % | HEIGHT: 67 IN | DIASTOLIC BLOOD PRESSURE: 81 MMHG | HEART RATE: 95 BPM | RESPIRATION RATE: 18 BRPM | BODY MASS INDEX: 36.75 KG/M2 | SYSTOLIC BLOOD PRESSURE: 121 MMHG

## 2024-04-25 DIAGNOSIS — Z17.0 MALIGNANT NEOPLASM OF OVERLAPPING SITES OF RIGHT BREAST IN FEMALE, ESTROGEN RECEPTOR POSITIVE: ICD-10-CM

## 2024-04-25 DIAGNOSIS — E03.9 HYPOTHYROIDISM, UNSPECIFIED TYPE: ICD-10-CM

## 2024-04-25 DIAGNOSIS — C77.3 MALIGNANT NEOPLASM METASTATIC TO LYMPH NODE OF AXILLA: Primary | ICD-10-CM

## 2024-04-25 DIAGNOSIS — R52 INTRACTABLE PAIN: ICD-10-CM

## 2024-04-25 DIAGNOSIS — C77.3 MALIGNANT NEOPLASM METASTATIC TO LYMPH NODE OF AXILLA: ICD-10-CM

## 2024-04-25 DIAGNOSIS — C50.811 MALIGNANT NEOPLASM OF OVERLAPPING SITES OF RIGHT BREAST IN FEMALE, ESTROGEN RECEPTOR POSITIVE: ICD-10-CM

## 2024-04-25 DIAGNOSIS — R10.9 ABDOMINAL CRAMPING: ICD-10-CM

## 2024-04-25 DIAGNOSIS — R10.30 LOWER ABDOMINAL PAIN: ICD-10-CM

## 2024-04-25 DIAGNOSIS — R19.7 DIARRHEA, UNSPECIFIED TYPE: ICD-10-CM

## 2024-04-25 LAB
ALBUMIN SERPL BCP-MCNC: 3.7 G/DL (ref 3.5–5.2)
ALP SERPL-CCNC: 70 U/L (ref 55–135)
ALT SERPL W/O P-5'-P-CCNC: 26 U/L (ref 10–44)
ANION GAP SERPL CALC-SCNC: 9 MMOL/L (ref 8–16)
AST SERPL-CCNC: 21 U/L (ref 10–40)
BASOPHILS # BLD AUTO: 0.06 K/UL (ref 0–0.2)
BASOPHILS NFR BLD: 1 % (ref 0–1.9)
BILIRUB SERPL-MCNC: 0.3 MG/DL (ref 0.1–1)
BUN SERPL-MCNC: 14 MG/DL (ref 6–20)
CALCIUM SERPL-MCNC: 9.6 MG/DL (ref 8.7–10.5)
CHLORIDE SERPL-SCNC: 105 MMOL/L (ref 95–110)
CO2 SERPL-SCNC: 24 MMOL/L (ref 23–29)
CREAT SERPL-MCNC: 1.5 MG/DL (ref 0.5–1.4)
DIFFERENTIAL METHOD BLD: ABNORMAL
EOSINOPHIL # BLD AUTO: 0.1 K/UL (ref 0–0.5)
EOSINOPHIL NFR BLD: 2.4 % (ref 0–8)
ERYTHROCYTE [DISTWIDTH] IN BLOOD BY AUTOMATED COUNT: 13.6 % (ref 11.5–14.5)
EST. GFR  (NO RACE VARIABLE): 41 ML/MIN/1.73 M^2
GLUCOSE SERPL-MCNC: 134 MG/DL (ref 70–110)
HCT VFR BLD AUTO: 37.6 % (ref 37–48.5)
HGB BLD-MCNC: 12.5 G/DL (ref 12–16)
IMM GRANULOCYTES # BLD AUTO: 0.05 K/UL (ref 0–0.04)
IMM GRANULOCYTES NFR BLD AUTO: 0.9 % (ref 0–0.5)
LDH SERPL L TO P-CCNC: 185 U/L (ref 110–260)
LYMPHOCYTES # BLD AUTO: 1.6 K/UL (ref 1–4.8)
LYMPHOCYTES NFR BLD: 28.1 % (ref 18–48)
MAGNESIUM SERPL-MCNC: 1.9 MG/DL (ref 1.6–2.6)
MCH RBC QN AUTO: 34.9 PG (ref 27–31)
MCHC RBC AUTO-ENTMCNC: 33.2 G/DL (ref 32–36)
MCV RBC AUTO: 105 FL (ref 82–98)
MONOCYTES # BLD AUTO: 0.5 K/UL (ref 0.3–1)
MONOCYTES NFR BLD: 8.9 % (ref 4–15)
NEUTROPHILS # BLD AUTO: 3.4 K/UL (ref 1.8–7.7)
NEUTROPHILS NFR BLD: 58.7 % (ref 38–73)
NRBC BLD-RTO: 0 /100 WBC
PHOSPHATE SERPL-MCNC: 3.5 MG/DL (ref 2.7–4.5)
PLATELET # BLD AUTO: 217 K/UL (ref 150–450)
PMV BLD AUTO: 11.6 FL (ref 9.2–12.9)
POTASSIUM SERPL-SCNC: 3.7 MMOL/L (ref 3.5–5.1)
PROT SERPL-MCNC: 7 G/DL (ref 6–8.4)
RBC # BLD AUTO: 3.58 M/UL (ref 4–5.4)
SODIUM SERPL-SCNC: 138 MMOL/L (ref 136–145)
T4 FREE SERPL-MCNC: 1.22 NG/DL (ref 0.71–1.51)
TSH SERPL DL<=0.005 MIU/L-ACNC: 3 UIU/ML (ref 0.4–4)
WBC # BLD AUTO: 5.84 K/UL (ref 3.9–12.7)

## 2024-04-25 PROCEDURE — 80053 COMPREHEN METABOLIC PANEL: CPT | Performed by: INTERNAL MEDICINE

## 2024-04-25 PROCEDURE — G2211 COMPLEX E/M VISIT ADD ON: HCPCS | Mod: S$PBB,,, | Performed by: INTERNAL MEDICINE

## 2024-04-25 PROCEDURE — 83735 ASSAY OF MAGNESIUM: CPT | Performed by: INTERNAL MEDICINE

## 2024-04-25 PROCEDURE — 85025 COMPLETE CBC W/AUTO DIFF WBC: CPT | Performed by: INTERNAL MEDICINE

## 2024-04-25 PROCEDURE — 83615 LACTATE (LD) (LDH) ENZYME: CPT | Performed by: INTERNAL MEDICINE

## 2024-04-25 PROCEDURE — 99214 OFFICE O/P EST MOD 30 MIN: CPT | Mod: S$PBB,,, | Performed by: INTERNAL MEDICINE

## 2024-04-25 PROCEDURE — 84443 ASSAY THYROID STIM HORMONE: CPT | Performed by: INTERNAL MEDICINE

## 2024-04-25 PROCEDURE — 3079F DIAST BP 80-89 MM HG: CPT | Mod: CPTII,,, | Performed by: INTERNAL MEDICINE

## 2024-04-25 PROCEDURE — 84439 ASSAY OF FREE THYROXINE: CPT | Performed by: INTERNAL MEDICINE

## 2024-04-25 PROCEDURE — 3008F BODY MASS INDEX DOCD: CPT | Mod: CPTII,,, | Performed by: INTERNAL MEDICINE

## 2024-04-25 PROCEDURE — 3074F SYST BP LT 130 MM HG: CPT | Mod: CPTII,,, | Performed by: INTERNAL MEDICINE

## 2024-04-25 PROCEDURE — 99215 OFFICE O/P EST HI 40 MIN: CPT | Mod: PBBFAC | Performed by: INTERNAL MEDICINE

## 2024-04-25 PROCEDURE — 99999 PR PBB SHADOW E&M-EST. PATIENT-LVL V: CPT | Mod: PBBFAC,,, | Performed by: INTERNAL MEDICINE

## 2024-04-25 PROCEDURE — 84100 ASSAY OF PHOSPHORUS: CPT | Performed by: INTERNAL MEDICINE

## 2024-04-25 PROCEDURE — 36415 COLL VENOUS BLD VENIPUNCTURE: CPT | Performed by: INTERNAL MEDICINE

## 2024-04-25 NOTE — PROGRESS NOTES
[facility]  [address] [facility] [address]  Phone: [phone];  Fax: [phone]    Patient ID: Josey Flores   Chief Complaint: Follow-up  MRN:  [id number]     Oncologic Diagnosis:    - Stage IV cervical squamous cell carcinoma  - Stage IV, T2 N1b M1, invasive breast carcinoma, ER/MS+, HER2-  - Papillary thyroid carcinoma status post total thyroidectomy on 08/31/2017, mpT1b N0   - Cervical HSIL MIREILLE 3 s/p LEEP, 2017  Previous Treatment:    - Palliative chemotherapy: cisplatin, paclitaxel and bevacizumab; 02/22/2021 cycle 1 day 1 -> bevacizumab maintenance after 6 cycles  - Carboplatin, paclitaxel and pembrolizumab, C1D1 1/28/22; maintenance pembrolizumab, d/c 9/2022  Current Treatment:    - Letrozole and palbociclib   Subjective   Josey Flores is a 53 y.o. female who presents to clinic for follow up and is accompanied by her spouse.      The patient reports that she is compliant with levothyroxine.  She continues to have the below documented ROS symptoms.  I recommend that she get back in with palliative care.  She is in agreement.  I will also send her to gastroenterology given GI symptoms.  I reviewed her imaging with her which is stable.  We will continue with current treatment plan.    The remainder of her symptoms remain unchanged.      Review of Systems   Constitutional:  Positive for appetite change (decreased). Negative for activity change, chills, diaphoresis, fatigue, fever and unexpected weight change.   HENT:  Negative for nosebleeds.    Respiratory:  Positive for cough (mostly when laying down). Negative for shortness of breath.    Cardiovascular:  Negative for chest pain.   Gastrointestinal:  Positive for abdominal pain. Negative for abdominal distention, anal bleeding, blood in stool, constipation, diarrhea, nausea and vomiting.   Genitourinary:  Negative for difficulty urinating and hematuria.   Musculoskeletal:  Positive for arthralgias.  Negative for back pain and myalgias.   Skin:  Negative for rash.   Neurological:  Negative for dizziness, weakness, light-headedness and headaches.   Hematological:  Does not bruise/bleed easily.   Psychiatric/Behavioral:  Positive for sleep disturbance (due to pain). The patient is not nervous/anxious.      History     Oncology History   Secondary cancer of bone    Chemotherapy    Treatment Summary   Plan Name: OP GYN PACLITAXEL CISPLATIN BEVACIZUMAB Q3W  Treatment Goal: Control  Status: Inactive  Start Date: 2/22/2021  End Date: 7/7/2021  Provider: Estefani Asencio MD  Chemotherapy: CISplatin (PLATINOL) 50 mg/m2 = 108 mg in sodium chloride 0.9% 608 mL chemo infusion, 50 mg/m2 = 108 mg, Intravenous, Clinic/HOD 1 time, 6 of 6 cycles  Administration: 108 mg (2/22/2021), 108 mg (3/15/2021), 108 mg (4/6/2021), 108 mg (5/11/2021), 108 mg (6/1/2021), 108 mg (6/22/2021)    bevacizumab (AVASTIN) 15 mg/kg = 1,480 mg in sodium chloride 0.9% 100 mL chemo infusion, 15 mg/kg = 1,480 mg, Intravenous, Clinic/HOD 1 time, 6 of 6 cycles  Administration: 1,480 mg (4/6/2021), 1,480 mg (2/23/2021), 1,480 mg (3/16/2021), 1,480 mg (6/1/2021), 1,480 mg (6/22/2021), 1,480 mg (5/12/2021)    PACLitaxeL (TAXOL) 175 mg/m2 = 378 mg in sodium chloride 0.9% 563 mL chemo infusion, 175 mg/m2 = 378 mg, Intravenous, Clinic/HOD 1 time, 6 of 6 cycles  Administration: 378 mg (2/22/2021), 378 mg (3/15/2021), 378 mg (4/6/2021), 378 mg (5/11/2021), 378 mg (6/1/2021), 378 mg (6/22/2021)    Plan Name: OP BEVACIZUMAB Q3W   Treatment Goal: Maintenance  Status: Inactive  Start Date: 7/13/2021  End Date: 12/28/2021  Provider: Estefani Asencio MD  Chemotherapy: bevacizumab (AVASTIN) 1,300 mg in sodium chloride 0.9% 100 mL chemo infusion, 1,345 mg, Intravenous, [facility] 1 time, 9 of 17 cycles  Administration: 1,300 mg (7/13/2021), 1,345 mg (8/3/2021), 1,300 mg (8/24/2021), 1,300 mg (9/14/2021), 1,345 mg (10/5/2021), 1,345 mg (10/26/2021), 1,345 mg  (11/16/2021), 1,300 mg (12/7/2021), 1,300 mg (12/28/2021)    Plan Name: OP CARBOPLATIN PACLITAXEL PEMBROLIZUMAB Q3W FOLLOWED BY MAINTENANCE PEMBROLIZUMAB 400 MG Q6W  Treatment Goal: Control  Status: Inactive  Start Date: 1/28/2022  End Date: 8/11/2022  Provider: Estefani Asencio MD  Chemotherapy: CARBOplatin (PARAPLATIN) 425 mg in sodium chloride 0.9% 292.5 mL chemo infusion, 425 mg, Intravenous, Clinic/HOD 1 time, 6 of 6 cycles  Dose modification:   (original dose 424 mg, Cycle 6),   (original dose 424 mg, Cycle 6)  Administration: 425 mg (1/28/2022), 495 mg (2/18/2022), 525 mg (6/2/2022), 525 mg (5/12/2022), 525 mg (3/11/2022), 495 mg (4/11/2022)    PACLitaxeL (TAXOL) 175 mg/m2 = 366 mg in sodium chloride 0.9% 561 mL chemo infusion, 175 mg/m2 = 366 mg (100 % of original dose 175 mg/m2), Intravenous, Clinic/HOD 1 time, 6 of 6 cycles  Dose modification: 175 mg/m2 (original dose 175 mg/m2, Cycle 1), 175 mg/m2 (original dose 175 mg/m2, Cycle 4)  Administration: 366 mg (1/28/2022), 366 mg (2/18/2022), 366 mg (3/11/2022), 366 mg (4/11/2022), 366 mg (5/12/2022), 366 mg (6/2/2022)       Malignant neoplasm of endocervix   9/10/2020 Imaging Significant Findings    CT A/P: Negative for ascites, omental caking, lymphadenopathy, or a gross cervical mass     12/7/2020 Biopsy    EMBX: SCC, p16+     1/12/2021 Imaging Significant Findings    MRI Pelvis w/ w/o  4.9 cm cervical mass  R parametrial invasion  R external iliac lymphadenopathy     1/27/2021 Imaging Significant Findings    PET/CT: FDG avidity in the chest     2/18/2021 Biopsy    Endoscopic biopsy of the lung: +      2/22/2021 - 6/29/2021 Chemotherapy    Cisplatin/paclitaxel/avastin x6     3/12/2021 Genetic Testing    STRATA: FRANK, PIK3c, Ep300, ERBB3, KDM6A     4/24/2021 Imaging Significant Findings    CT Pelvis w/: JUAN FRANCISCO with a normal cervix     4/26/2021 Imaging Significant Findings    PET/CT: No FDG avidity     5/18/2021 Imaging Significant Findings    CT A/P w/:  JUAN FRANCISCO     6/5/2021 Imaging Significant Findings    CT A/P w/: JUAN FRANCISCO     7/12/2021 Imaging Significant Findings    PET/CT: JUAN FRANCISCO     7/13/2021 - 12/28/2021 Maintenance Therapy    Avastin x 9     10/20/2021 Imaging Significant Findings    PET/CT: JUAN FRANCISCO     1/11/2022 Imaging Significant Findings    PET/CT: Increased FDG avidity in the cervix.  No other evidence of disease.     1/28/2022 - 6/2/2022 Chemotherapy    Carboplatin/paclitaxel/pembrolizumab x 6     3/25/2022 Imaging Significant Findings    PET/CT: Persistent FDG avidity in the cervix.  New FDG avidity in the inguinal lymph nodes (although it does not look suspicious)     4/25/2022 - 4/29/2022 Radiation Therapy    Treating physician: Dr. Vázquez (MRI guided T&O)  Total Dose: 30 Gy  Fractions: 5     6/30/2022 - 8/11/2022 Maintenance Therapy    Pembrolizumab 400 mg IV x2     8/5/2022 Imaging Significant Findings    PET/CT: JUAN FRANCISCO     10/16/2022 Imaging Significant Findings    CT A/P w/ (Women's without imaging available): JUAN FRANCISCO     1/11/2023 Imaging Significant Findings    PET/CT: JUAN FRANCISCO      Chemotherapy    Treatment Summary   Plan Name: OP GYN PACLITAXEL CISPLATIN BEVACIZUMAB Q3W  Treatment Goal: Control  Status: Inactive  Start Date: 2/22/2021  End Date: 7/7/2021  Provider: Estefani Asencio MD  Chemotherapy: CISplatin (PLATINOL) 50 mg/m2 = 108 mg in sodium chloride 0.9% 608 mL chemo infusion, 50 mg/m2 = 108 mg, Intravenous, Clinic/HOD 1 time, 6 of 6 cycles  Administration: 108 mg (2/22/2021), 108 mg (3/15/2021), 108 mg (4/6/2021), 108 mg (5/11/2021), 108 mg (6/1/2021), 108 mg (6/22/2021)    bevacizumab (AVASTIN) 15 mg/kg = 1,480 mg in sodium chloride 0.9% 100 mL chemo infusion, 15 mg/kg = 1,480 mg, Intravenous, Clinic/HOD 1 time, 6 of 6 cycles  Administration: 1,480 mg (4/6/2021), 1,480 mg (2/23/2021), 1,480 mg (3/16/2021), 1,480 mg (6/1/2021), 1,480 mg (6/22/2021), 1,480 mg (5/12/2021)    PACLitaxeL (TAXOL) 175 mg/m2 = 378 mg in sodium chloride 0.9% 563 mL chemo infusion,  175 mg/m2 = 378 mg, Intravenous, Clinic/HOD 1 time, 6 of 6 cycles  Administration: 378 mg (2/22/2021), 378 mg (3/15/2021), 378 mg (4/6/2021), 378 mg (5/11/2021), 378 mg (6/1/2021), 378 mg (6/22/2021)    Plan Name: OP BEVACIZUMAB Q3W   Treatment Goal: Maintenance  Status: Inactive  Start Date: 7/13/2021  End Date: 12/28/2021  Provider: Estefani Asencio MD  Chemotherapy: bevacizumab (AVASTIN) 1,300 mg in sodium chloride 0.9% 100 mL chemo infusion, 1,345 mg, Intravenous, Clinic/HOD 1 time, 9 of 17 cycles  Administration: 1,300 mg (7/13/2021), 1,345 mg (8/3/2021), 1,300 mg (8/24/2021), 1,300 mg (9/14/2021), 1,345 mg (10/5/2021), 1,345 mg (10/26/2021), 1,345 mg (11/16/2021), 1,300 mg (12/7/2021), 1,300 mg (12/28/2021)    Plan Name: OP CARBOPLATIN PACLITAXEL PEMBROLIZUMAB Q3W FOLLOWED BY MAINTENANCE PEMBROLIZUMAB 400 MG Q6W  Treatment Goal: Control  Status: Inactive  Start Date: 1/28/2022  End Date: 8/11/2022  Provider: Estefani Asencio MD  Chemotherapy: CARBOplatin (PARAPLATIN) 425 mg in sodium chloride 0.9% 292.5 mL chemo infusion, 425 mg, Intravenous, Clinic/HOD 1 time, 6 of 6 cycles  Dose modification:   (original dose 424 mg, Cycle 6),   (original dose 424 mg, Cycle 6)  Administration: 425 mg (1/28/2022), 495 mg (2/18/2022), 525 mg (6/2/2022), 525 mg (5/12/2022), 525 mg (3/11/2022), 495 mg (4/11/2022)    PACLitaxeL (TAXOL) 175 mg/m2 = 366 mg in sodium chloride 0.9% 561 mL chemo infusion, 175 mg/m2 = 366 mg (100 % of original dose 175 mg/m2), Intravenous, Clinic/HOD 1 time, 6 of 6 cycles  Dose modification: 175 mg/m2 (original dose 175 mg/m2, Cycle 1), 175 mg/m2 (original dose 175 mg/m2, Cycle 4)  Administration: 366 mg (1/28/2022), 366 mg (2/18/2022), 366 mg (3/11/2022), 366 mg (4/11/2022), 366 mg (5/12/2022), 366 mg (6/2/2022)             Past Medical History:   Diagnosis Date    Anxiety     Asthma     Breast cancer 2017    Cancer     right breast, metastatic-lymph nodes, bone, and thyroid     COPD (chronic  obstructive pulmonary disease)     Depression     History of psychiatric hospitalization     2000; suicidal ideation    Hx of psychiatric care     Hypothyroidism     Miscarriage     five miscarriages    Obesity     Psychiatric problem     Thyroid cancer 2017       Past Surgical History:   Procedure Laterality Date    ABSCESS DRAINAGE      bilateral axilla    ADENOIDECTOMY      APPENDECTOMY      BREAST BIOPSY Right     x3    CHOLECYSTECTOMY      ENDOBRONCHIAL ULTRASOUND Bilateral 02/18/2021    Procedure: ENDOBRONCHIAL ULTRASOUND (EBUS);  Surgeon: Jaron Ni MD;  Location: [facility] ENDO;  Service: Pulmonary;  Laterality: Bilateral;    FLUOROSCOPY N/A 01/28/2021    Procedure: Mediport placement;  Surgeon: Noah Phelan MD;  Location: [facility] CATH LAB;  Service: General;  Laterality: N/A;    MASS EXCISION      MEDIPORT INSERTION, SINGLE Right 02/2021    SKIN GRAFT  06/16/2017    THYROIDECTOMY Bilateral     TONSILLECTOMY         Family History   Problem Relation Name Age of Onset    Arthritis Mother Beatrice     Early death Mother Beatrice         64 at time of death    Heart attack Mother Beatrice     Heart disease Mother Beatrice     Heart failure Mother Beatrice     Hypertension Mother Beatrice     Coronary artery disease Father David     Hearing loss Father David     Heart disease Father David     Hyperlipidemia Father David     Hypertension Father David     Mental illness Father David     Learning disabilities Son Keyur     Cancer Maternal Aunt         Review of patient's allergies indicates:   Allergen Reactions    Gabapentin Swelling     Note: unilateral joint edema, unclear if due to gabapentin    Nsaids (non-steroidal anti-inflammatory drug) Other (See Comments)     Instructed not to take NSAID drugs with chemo pill.    Zometa [zoledronic acid] Other (See Comments)     Possible osteonecrosis jaw    Clindamycin Rash    Vancomycin analogues Itching       Social History     Tobacco Use    Smoking status:  "Every Day     Current packs/day: 1.00     Average packs/day: 1 pack/day for 39.3 years (39.3 ttl pk-yrs)     Types: Cigarettes     Start date: 1/1/1985     Passive exposure: Current    Smokeless tobacco: Never    Tobacco comments:     45 pack year history   Substance Use Topics    Alcohol use: No    Drug use: No       Physical Exam   ECOG:   ECOG SCORE    1 - Restricted in strenuous activity-ambulatory and able to carry out work of a light nature          Vitals:  /81   Pulse 95   Temp 97.6 °F (36.4 °C)   Resp 18   Ht 5' 7" (1.702 m)   Wt 106.2 kg (234 lb 2.1 oz)   LMP 12/05/2020 Comment: no cycle x3 years  SpO2 100%   BMI 36.67 kg/m²     Physical Exam:  Physical Exam  Constitutional:       General: She is not in acute distress.     Appearance: Normal appearance.   HENT:      Head: Normocephalic and atraumatic.   Eyes:      Extraocular Movements: Extraocular movements intact.      Conjunctiva/sclera: Conjunctivae normal.   Cardiovascular:      Rate and Rhythm: Normal rate.   Pulmonary:      Effort: Pulmonary effort is normal. No respiratory distress.   Abdominal:      General: There is no distension.      Palpations: Abdomen is soft. There is no hepatomegaly or splenomegaly.      Tenderness: There is no abdominal tenderness. There is no guarding.   Skin:     Findings: No rash.   Neurological:      General: No focal deficit present.      Mental Status: She is alert.   Psychiatric:         Mood and Affect: Mood normal.         Behavior: Behavior normal.         Thought Content: Thought content normal.          Labs   Labs:  Lab Visit on 04/25/2024   Component Date Value Ref Range Status    Phosphorus 04/25/2024 3.5  2.7 - 4.5 mg/dL Final    Magnesium 04/25/2024 1.9  1.6 - 2.6 mg/dL Final    Sodium 04/25/2024 138  136 - 145 mmol/L Final    Potassium 04/25/2024 3.7  3.5 - 5.1 mmol/L Final    Chloride 04/25/2024 105  95 - 110 mmol/L Final    CO2 04/25/2024 24  23 - 29 mmol/L Final    Glucose 04/25/2024 134 " (H)  70 - 110 mg/dL Final    BUN 04/25/2024 14  6 - 20 mg/dL Final    Creatinine 04/25/2024 1.5 (H)  0.5 - 1.4 mg/dL Final    Calcium 04/25/2024 9.6  8.7 - 10.5 mg/dL Final    Total Protein 04/25/2024 7.0  6.0 - 8.4 g/dL Final    Albumin 04/25/2024 3.7  3.5 - 5.2 g/dL Final    Total Bilirubin 04/25/2024 0.3  0.1 - 1.0 mg/dL Final    Comment: For infants and newborns, interpretation of results should be based  on gestational age, weight and in agreement with clinical  observations.    Premature Infant recommended reference ranges:  Up to 24 hours.............<8.0 mg/dL  Up to 48 hours............<12.0 mg/dL  3-5 days..................<15.0 mg/dL  6-29 days.................<15.0 mg/dL      Alkaline Phosphatase 04/25/2024 70  55 - 135 U/L Final    AST 04/25/2024 21  10 - 40 U/L Final    ALT 04/25/2024 26  10 - 44 U/L Final    eGFR 04/25/2024 41 (A)  >60 mL/min/1.73 m^2 Final    Anion Gap 04/25/2024 9  8 - 16 mmol/L Final    WBC 04/25/2024 5.84  3.90 - 12.70 K/uL Final    RBC 04/25/2024 3.58 (L)  4.00 - 5.40 M/uL Final    Hemoglobin 04/25/2024 12.5  12.0 - 16.0 g/dL Final    Hematocrit 04/25/2024 37.6  37.0 - 48.5 % Final    MCV 04/25/2024 105 (H)  82 - 98 fL Final    MCH 04/25/2024 34.9 (H)  27.0 - 31.0 pg Final    MCHC 04/25/2024 33.2  32.0 - 36.0 g/dL Final    RDW 04/25/2024 13.6  11.5 - 14.5 % Final    Platelets 04/25/2024 217  150 - 450 K/uL Final    MPV 04/25/2024 11.6  9.2 - 12.9 fL Final    Immature Granulocytes 04/25/2024 0.9 (H)  0.0 - 0.5 % Final    Gran # (ANC) 04/25/2024 3.4  1.8 - 7.7 K/uL Final    Immature Grans (Abs) 04/25/2024 0.05 (H)  0.00 - 0.04 K/uL Final    Comment: Mild elevation in immature granulocytes is non specific and   can be seen in a variety of conditions including stress response,   acute inflammation, trauma and pregnancy. Correlation with other   laboratory and clinical findings is essential.      Lymph # 04/25/2024 1.6  1.0 - 4.8 K/uL Final    Mono # 04/25/2024 0.5  0.3 - 1.0 K/uL  Final    Eos # 04/25/2024 0.1  0.0 - 0.5 K/uL Final    Baso # 04/25/2024 0.06  0.00 - 0.20 K/uL Final    nRBC 04/25/2024 0  0 /100 WBC Final    Gran % 04/25/2024 58.7  38.0 - 73.0 % Final    Lymph % 04/25/2024 28.1  18.0 - 48.0 % Final    Mono % 04/25/2024 8.9  4.0 - 15.0 % Final    Eosinophil % 04/25/2024 2.4  0.0 - 8.0 % Final    Basophil % 04/25/2024 1.0  0.0 - 1.9 % Final    Differential Method 04/25/2024 Automated   Final        Imaging   CT Chest Abdomen Without Contrast (XPD)  - 01/13/2024  Impression:  Stable exam.  7 mm right nodule, unchanged.  Calcified granuloma with calcified right hilar lymph nodes.  Fatty infiltration of the liver.  Status post cholecystectomy.         CT CHEST ABDOMEN PELVIS WITHOUT CONTRAST(XPD) - 04/15/24  COMPARISON:  01/13/2024     FINDINGS:  CT of chest without contrast:     The pulmonary window images demonstrate stable appearance of a calcified granuloma located laterally in the right upper lobe and a linear density more characteristic of localized pulmonary scarring also seen laterally in the right upper lobe (axial image 179).  Centrilobular emphysema is also visible in the upper lobes bilaterally, more severe on the right.  No pleural effusion or lung consolidation is appreciated.  No pathologic lymph node enlargement is visible in the mediastinum or axilla bilaterally.  Dense lymph node calcification associated with granulomatous disease is again noted in the mediastinum and right hilum.  Heart size is normal.  No chest wall abnormalities are appreciated except for evidence of apparent prior breast biopsy on the right.  A MediPort is also seen entering the right internal jugular vein.     CT of abdomen and pelvis without contrast:     The liver is enlarged measuring 22.7 cm in its craniocaudal dimension.  Decreased attenuation is also visible in the liver parenchyma consistent with generalized hepatic steatosis.  The spleen appears normal except for multiple incidental  punctate granulomatous calcifications.  The pancreas and adrenals appear normal.  The gallbladder is surgically absent.  No parenchymal abnormality, hydronephrosis or intrarenal calculi are visible in either kidney.  No free intraperitoneal fluid or lymph node enlargement is identified.     Images obtained through the pelvis demonstrate no gross abnormality in an incompletely distended urinary bladder.  The uterus and ovaries are surgically absent.  No free pelvic fluid or abnormal soft tissue masses are identified.     Impression:  Emphysematous changes, most severe in the right upper lobe.  Stable faint noncalcified nodular focal pulmonary scarring in the right upper lobe compared to 01/13/2024.  Hepatomegaly and generalized hepatic steatosis.  Status post cholecystectomy and hysterectomy.    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   Dec 2020 patient had vaginal bleeding and MRI pelvis showed LAD  TB discussion 01/29/21: consensus for systemic chemotherapy given advanced cervical squamous cell carcinoma with cisplatin, paclitaxel and bevacizumab with discontinuation of palbociclib but can continue aromatase inhibitor; taxane may also be active for her concomitant metastatic breast cancer.   Renetta genetic testing negative  Treated with 6C  (initiated 02/21/21) of Cis/Paclitaxel/Karina with improvement of disease and started on maintenance Karina   PET-CT January 2022 notable for uptake in cervical region concerning for oligo progression/recurrence of her disease.  Previously biopsy proven metastatic cervical cancer in lungs without evidence of recurrent mass/lymphadenopathy/avidity in this region or otherwise.  Treated with chemo-immunotherapy (C1 Pembrolizumab and Carbo/Paclitaxel)  and vaginal brachy therapy([facility])  Restaging scans after this showed no evidence of recurrence and decision to hold therapy made; she was resumed on Ibrance for metastatic breast cancer  Continue with surveillance imaging       Chronic Medical Conditions  Hx of CVA June 2023  COPD  History of papillary thyroid carcinoma status post total thyroidectomy on 08/31/2017:   s/p total thyroidectomy on levothyroxine.   Hypothyroidism:   Previously on levothyroxine 275 mcg daily.    TFTs normalized   Increased Synthroid to 300 mcg daily and TFTs improved.  Continue Synthroid 300 mcg daily  Continue repeat thyroid function labs for monitoring.  Refer to endocrinology   Macrocytosis:   Possibly related to thyroid disorder will continue to monitor.  She is on vitamin-B supplement.  No new anemia.  Neuropathy:     Mild, will monitor   Tobacco abuse:    Precontemplative; continued cessation encouraged        Med Onc Chart Routing      Follow up with physician    Follow up with MIGUEL 4 weeks. Prieto   Infusion scheduling note    Injection scheduling note    Labs CBC, CMP, phosphorus, magnesium, TSH and free T4   Scheduling:  Preferred lab:  Lab interval:     Imaging    Pharmacy appointment    Other referrals                  > 45 minutes was used in speaking with patient, and chart review and complex patient care.    The patient was seen, interviewed and examined. Pertinent lab and radiologic studies were reviewed. Pt instructed to call should they develop concerning signs/symptoms or  have further questions.        Portions of the record may have been created with voice recognition software. Occasional wrong-word or sound-a-like substitutions may have occurred due to the inherent limitations of voice recognition software. Read the chart carefully and recognize, using context, where substitutions have occurred.      Donita Nuñez MD    Hematology/Oncology

## 2024-04-30 ENCOUNTER — PATIENT MESSAGE (OUTPATIENT)
Dept: INTERNAL MEDICINE | Facility: CLINIC | Age: 54
End: 2024-04-30
Payer: MEDICAID

## 2024-05-09 ENCOUNTER — OFFICE VISIT (OUTPATIENT)
Dept: PALLIATIVE MEDICINE | Facility: CLINIC | Age: 54
End: 2024-05-09
Payer: MEDICAID

## 2024-05-09 ENCOUNTER — DOCUMENTATION ONLY (OUTPATIENT)
Dept: PALLIATIVE MEDICINE | Facility: CLINIC | Age: 54
End: 2024-05-09
Payer: MEDICAID

## 2024-05-09 VITALS
BODY MASS INDEX: 37.02 KG/M2 | OXYGEN SATURATION: 99 % | DIASTOLIC BLOOD PRESSURE: 68 MMHG | HEIGHT: 67 IN | SYSTOLIC BLOOD PRESSURE: 109 MMHG | WEIGHT: 235.88 LBS | TEMPERATURE: 99 F | HEART RATE: 99 BPM

## 2024-05-09 DIAGNOSIS — F32.A ANXIETY AND DEPRESSION: ICD-10-CM

## 2024-05-09 DIAGNOSIS — F41.9 ANXIETY AND DEPRESSION: ICD-10-CM

## 2024-05-09 DIAGNOSIS — Z17.0 MALIGNANT NEOPLASM OF OVERLAPPING SITES OF RIGHT BREAST IN FEMALE, ESTROGEN RECEPTOR POSITIVE: Primary | ICD-10-CM

## 2024-05-09 DIAGNOSIS — C79.81 METASTATIC MALIGNANT NEOPLASM TO BREAST: ICD-10-CM

## 2024-05-09 DIAGNOSIS — G89.3 NEOPLASM RELATED PAIN: ICD-10-CM

## 2024-05-09 DIAGNOSIS — C50.811 MALIGNANT NEOPLASM OF OVERLAPPING SITES OF RIGHT BREAST IN FEMALE, ESTROGEN RECEPTOR POSITIVE: Primary | ICD-10-CM

## 2024-05-09 DIAGNOSIS — Z51.5 PALLIATIVE CARE ENCOUNTER: ICD-10-CM

## 2024-05-09 PROCEDURE — 3008F BODY MASS INDEX DOCD: CPT | Mod: CPTII,,,

## 2024-05-09 PROCEDURE — 1159F MED LIST DOCD IN RCRD: CPT | Mod: CPTII,,,

## 2024-05-09 PROCEDURE — 99215 OFFICE O/P EST HI 40 MIN: CPT | Mod: S$PBB,,,

## 2024-05-09 PROCEDURE — 3078F DIAST BP <80 MM HG: CPT | Mod: CPTII,,,

## 2024-05-09 PROCEDURE — 99999 PR PBB SHADOW E&M-EST. PATIENT-LVL V: CPT | Mod: PBBFAC,,,

## 2024-05-09 PROCEDURE — 99215 OFFICE O/P EST HI 40 MIN: CPT | Mod: PBBFAC

## 2024-05-09 PROCEDURE — 3074F SYST BP LT 130 MM HG: CPT | Mod: CPTII,,,

## 2024-05-09 RX ORDER — ALPRAZOLAM 0.5 MG/1
0.5 TABLET ORAL 2 TIMES DAILY PRN
Qty: 60 TABLET | Refills: 0 | Status: SHIPPED | OUTPATIENT
Start: 2024-05-09 | End: 2024-06-14

## 2024-05-09 RX ORDER — HYDROCODONE BITARTRATE AND ACETAMINOPHEN 5; 325 MG/1; MG/1
1 TABLET ORAL EVERY 8 HOURS PRN
Qty: 90 TABLET | Refills: 0 | Status: SHIPPED | OUTPATIENT
Start: 2024-05-09 | End: 2024-05-14 | Stop reason: SDUPTHER

## 2024-05-09 NOTE — Clinical Note
Chet Corcoran,   Can you please help schedule Ms. Flores for 1 month follow-up?  Thank you,   ADRYAN.  Subjective   History of Present Illness  1-year-old female presents ER chief complaint of fever.  Mother source the history.  Mother verbalizes patient has recently been treated for urinary tract infection.  Past medical history is unremarkable.  Patient is eating and drinking.      Review of Systems   Constitutional:  Positive for fever.   HENT: Negative.     Eyes: Negative.    Respiratory: Negative.     Cardiovascular: Negative.  Negative for chest pain.   Gastrointestinal: Negative.  Negative for abdominal pain.   Endocrine: Negative.    Genitourinary: Negative.  Negative for dysuria.   Skin: Negative.    Neurological: Negative.    All other systems reviewed and are negative.    No past medical history on file.    No Known Allergies    No past surgical history on file.    Family History   Problem Relation Age of Onset    Hypertension Maternal Grandfather         Copied from mother's family history at birth    Diabetes Maternal Grandmother         Copied from mother's family history at birth    Hypertension Maternal Grandmother         Copied from mother's family history at birth    Mental illness Mother         Copied from mother's history at birth       Social History     Socioeconomic History    Marital status: Single           Objective   Physical Exam  Vitals and nursing note reviewed.   Constitutional:       General: She is active.      Appearance: She is well-developed.   HENT:      Head: Atraumatic.      Mouth/Throat:      Mouth: Mucous membranes are moist.      Pharynx: Oropharynx is clear.   Eyes:      Conjunctiva/sclera: Conjunctivae normal.   Cardiovascular:      Rate and Rhythm: Normal rate and regular rhythm.   Pulmonary:      Effort: Pulmonary effort is normal. No respiratory distress, nasal flaring or retractions.      Breath sounds: Normal breath sounds.   Abdominal:      General: Bowel sounds are normal. There is no distension.      Palpations: Abdomen is soft.      Tenderness: There is no  abdominal tenderness.   Musculoskeletal:         General: Normal range of motion.   Skin:     General: Skin is warm and dry.      Findings: No petechiae.   Neurological:      Mental Status: She is alert.      Cranial Nerves: No cranial nerve deficit.      Motor: No abnormal muscle tone.      Coordination: Coordination normal.       Procedures           ED Course                                           Medical Decision Making  1-year-old with fever.    Problems Addressed:  Acute febrile illness: acute illness or injury     Details: Does not appear to show significant urinary tract infection.  This may be waning phase of her previous UTI.  Patient be started on Omnicef twice a day.  Rotate Tylenol Motrin every 4-6 hours as needed.  There are no rashes on the hands or feet.  Low concerns for Kawasaki's at this point in time.  No conjunctival erythema.  If fever still persistent by the 30th told mother to go ahead and return to the ER for lab draws.    Amount and/or Complexity of Data Reviewed  Independent Historian: parent  Labs: ordered.        Final diagnoses:   Acute febrile illness       ED Disposition  ED Disposition       ED Disposition   Discharge    Condition   Stable    Comment   --               Esau Schuster MD  [address] [place], [place] [address] [phone]    Go in 2 days           Medication List        New Prescriptions      cefdinir 250 MG/5ML suspension  Commonly known as: OMNICEF  Take 1.7 mL by mouth 2 (Two) Times a Day for 10 days.               Where to Get Your Medications        These medications were sent to [facility] - [place], [place] - [address] [facility] [address] - [phone]  - [phone] FX  [address] [facility] [address]      Phone: [phone]   cefdinir 250 MG/5ML suspension            Herb Schafer II, PA  07/26/23 3773

## 2024-05-09 NOTE — PROGRESS NOTES
Clinical Prescription Medication Monitoring   Collection of  Urine Medication Screening for Palliative Clinic     Patient in office today for palliative care visit  EP follow up for cancer related pain management.     Pt signed pain contract, understands this urine drug screening is for palliative protocol, medication monitoring program, Pt agrees to process        Date:  5-     Urine Drug Screening: Med Monitoring     ICD 10Code Used: G89.3,  F11.20     Location of this visit : [facility]     Orders received by Provider Named :  ADRYAN Coy Patient ID was preformed by Patient : Yes     Urine  Specimen obtained  by nurse:  VERENA Cummings     Patient verified  full name and date of birth, Lables placed on specimen: Yes     Urine was successfully collected with in normal ranges: Yes 97%     Labeled and sealed in presence of pt. : Yes     Nurse called [facility] , Rep.with [facility] stated specimens will be picked up   Date: 5-     Nurse has specimen secured in cancer center labs in cancer center lab: Yes     [facility] Confirmation Number :    [id number]     Results will be scanned under the Lab tab in the pt's chart once sent back from [facility]. Yes     Nurse/VERENA Cummings

## 2024-05-09 NOTE — PROGRESS NOTES
Palliative Medicine         Follow-up    SUBJECTIVE:     Josey Flores is a 52 y.o. female with history of metastatic breast cancer, thyroid cancer post thyroidectomy (2017), and cervical cancer (2017). She is on surveillance on Letrozole and Ibrance. PET scan in May 2023 showed no evidence of recurrence or metastatic disease. She had a stroke with left-sided weakness (June 02, 2023). She is followed by Dr. Joseph    Chief complaint: Pain/Mood/Financial Distress  5/14/2024:         Pt attended clinic with her partner, Mr. Mccallum. She appeared uncomfortable. Vital signs stable on room air.   She has established care with chronic pain provider, Dr. Zurita  She reported she is now taking Lyrica and Duloxetine 60mg daily.   However, she has noted worsening abd/pelvic pain.   She is also attempting to self-wean her Norco 10mg Q6H PRN. She is now taking Norco 5mg Q6H PRN. Self weaning started 4-5 days ago   She is concerned about running out of narcotics and Dr. Zurita not willing to prescribe narcotics. I have explained to her I can not prescribe narcotics as her previous scans have shown no evidence of recurrence/metastasis.   Next CT on Saturday, Jan 13, 2024  Anxiety/Depression due to her dog's recent death.    Unsuccessful with smoking cessation due to stress    LA  Reviewed and Summarized:       Pt attended clinic with her partner, lAessio. She was in no acute no acute distress. Vital signs stable on room air. She continues to have  bilateral rib pain and lower abdominal pain 5/10, which she is managing with Norco 10mg QID PRN. Persistent anxiety/depression/insomnia/restlessness due to financial stress with moving to a new house and repairing her car.   She is taking Xanax 1mg BID, but no longer has Duloxetine.   We discussed referring her to chronic pain if her upcoming scan shows no evidence of recurrence.   Pt stated she is unsure why her pain has not improved despite scans showing no evidence of recurrence.      JEANETTE GANNON Reviewed and Summarized:        Past Visits:     8/17/2023: Pt attended clinic with her partner, Alessio. She was in no acute distress. Vital signs stable on room air.  Pt reported bilateral rib pain and lower abdominal pain 5/10, which she is managing with Norco 10mg QID PRN. She states she used Morphine ER 15mg once when she could not sleep due to pain. She informed me she smoked marijuana occassionally to help with pain, anxiety, and insomnia. She obtains Marijuana from the street. We discussed referring her to a physician who can prescribe Medical Marijauna, as the ones obtained from the street are often contaminated with opioids and other substances, which can increase her risks for injury. She agreed to medical Marijuana referral through [facility].     She is responding to physiotherapy and rehabilitation well. However, she continues to experience anxiety due to the uncertainty of her granddaughter's whereabouts, who presumably lives in [place] with her mother and stepfather. She stated she is worried that her granddaughter is not being cared for due to her mother's history of substance abuse. She stated taking Xanax and Duloxetine have been helpful, and she is open to mental health counseling to further help with her anxiety/depression.       JEANETTE GANNON Reviewed and Summarized:        Please see previous notes for  previous encounters.     OBJECTIVE:     Review of Systems   Constitutional:  Negative for malaise/fatigue and weight loss.   HENT:  Negative for sore throat.    Respiratory:  Positive for shortness of breath (Intermittent on exertion). Negative for cough and wheezing.    Cardiovascular:  Negative for chest pain and palpitations.   Gastrointestinal:  Positive for nausea. Negative for abdominal pain, constipation and diarrhea.   Musculoskeletal:         Bilateral rib pain   Neurological:  Positive for weakness (S/P stroke. Left-sided weakness). Negative for  dizziness, tingling, sensory change, speech change, seizures, loss of consciousness and headaches.   Psychiatric/Behavioral:  Positive for depression. Negative for suicidal ideas. The patient is nervous/anxious and has insomnia.      Review of Symptoms      Symptom Assessment (ESAS 0-10 Scale)  Pain:  7  Dyspnea:  6  Anxiety:  9  Nausea:  5  Depression:  9  Anorexia:  9  Fatigue:  0  Insomnia:  10  Restlessness:  7  Agitation:  9     CAM / Delirium:  Negative  Constipation:  Negative  Diarrhea:  Negative    Anxiety:  Is nervous/anxious  Constipation:  No constipation    Bowel Management Plan (BMP):  Yes      Pain Assessment    Location(s): Abdomen and bilateral ribs    Abdomen       Location: lower        Quality: Aching        Quantity: 7/10 in intensity        Chronicity: Onset 1 year (s) ago, uncontrolled       Aggravating Factors: Activity        Alleviating Factors: Opiates       Associated Symptoms: Myalgias      Modified Osmar Scale:  0    ECOG Performance Status stGstrstastdstest:st st1st Living Arrangements:  Lives with spouse    Psychosocial/Cultural:   See Palliative Psychosocial Note: No  Lives with her significant other of 16 years: they have 3 children combined, 2 sons from previous marriage. Granddaughter recently moved out of the home  **Primary  to Follow**  Palliative Care  Consult: No    Advance Care Planning   Advance Directives:   Living Will: Yes        Copy on chart: Yes    LaPOST: Yes    Do Not Resuscitate Status: Yes    Agent's Name:  David Acevedo, sister, [phone]    Decision Making:  Patient answered questions  Goals of Care: What is most important right now is to focus on remaining as independent as possible, symptom/pain control, curative/life-prolongation (regardless of treatment burdens). Accordingly, we have decided that the best plan to meet the patient's goals includes continuing with treatment.      Physical Exam:  Vitals:    Physical Exam  Vitals and nursing note  reviewed.   Constitutional:       General: She is not in acute distress.     Appearance: Normal appearance. She is normal weight. She is not ill-appearing.   HENT:      Head: Normocephalic and atraumatic.      Comments: Left facial droop     Nose: Nose normal. No congestion or rhinorrhea.      Mouth/Throat:      Mouth: Mucous membranes are moist.      Pharynx: Oropharynx is clear. No oropharyngeal exudate or posterior oropharyngeal erythema.   Eyes:      General: No scleral icterus.        Right eye: No discharge.         Left eye: No discharge.      Conjunctiva/sclera: Conjunctivae normal.      Pupils: Pupils are equal, round, and reactive to light.   Cardiovascular:      Rate and Rhythm: Normal rate and regular rhythm.      Pulses: Normal pulses.      Heart sounds: Normal heart sounds. No murmur heard.     No gallop.   Pulmonary:      Effort: Pulmonary effort is normal. No respiratory distress.      Breath sounds: Normal breath sounds. No stridor. No wheezing.   Chest:      Chest wall: No tenderness.   Abdominal:      General: Bowel sounds are normal. There is no distension.      Palpations: Abdomen is soft.      Tenderness: There is abdominal tenderness (lower abdomen).   Musculoskeletal:         General: No swelling or tenderness. Normal range of motion.      Cervical back: Normal range of motion and neck supple.      Right lower leg: No edema.      Left lower leg: No edema.      Comments: S/P stroke. Left arm and left leg weakness   Skin:     General: Skin is warm and dry.      Capillary Refill: Capillary refill takes less than 2 seconds.      Coloration: Skin is not jaundiced or pale.      Findings: No rash.   Neurological:      Mental Status: She is alert and oriented to person, place, and time.      GCS: GCS eye subscore is 4. GCS verbal subscore is 5. GCS motor subscore is 6.      Motor: Weakness (s/p stroke June 02/ Left sided weakness) present.      Gait: Gait abnormal (Limping).   Psychiatric:          Attention and Perception: Attention normal.         Mood and Affect: Mood is anxious.         Speech: Speech normal.         Behavior: Behavior normal.         Thought Content: Thought content normal.         Cognition and Memory: Cognition normal.         Judgment: Judgment normal.          Radiology and laboratory data reviewed    ASSESSMENT/PLAN:     Malignant neoplasm of overlapping sites of right breast in female, estrogen receptor positive  -Followed by Dr. Joseph. Previously Dr. Asencio  -On Surveillance- Continues Letrozole and Ibrance  -PET scan 5/03: There is a focal mildly hypermetabolic ground-glass opacity in the left lower lobe which is new from the prior examination. This is more likely infectious/inflammatory in nature. Short-term CT chest follow-up is advised.  CT C/A/P: 1/13/2024: Stable exam.  7 mm right nodule, unchanged.  Calcified granuloma with calcified right hilar lymph nodes.  Fatty infiltration of the liver.  Status post cholecystectomy.    Malignant neoplasm of endocervix  -Followed by  noted JUAN FRANCISCO  -Holding further maintenance immunotherapy at this time given no evidence of recurrent / progressive metastatic disease    -Surveillance for cervical cancer.    Abd/pelvic pain  Unclear etiology, as scans have been stable.  -Stop MSER as it is not effective, and trial opioid weaning in the setting of stable scans.  - Pt prefers to stop long-acting than decrease Norco frequency.  -Continue Norco 5mg QID PRN not to exceed 4 doses in a day. Pt now self-wean from [place] 10mg QID PRN.  Pt now seeing chronic pain management, Dr. Zurita given no evidence of cancer recurrence or metastasis  Pt to discuss opioid weaning with Dr. Zurita to avoid withdrawal.  UDS-7/12/2023: Showed Oxycodone, marijuana, and Morphine.  -Referral to [facility] for Medical Marijuana RX  Narcan rx- Pt stated she knows how to use it     Anxiety/Depression  -Uncontrolled due to financial stress.   -Continue Xanax 0.5mg-1mg  BID PRN  -Continue Duloxetine 60mg Daily. Increased from 30mg daily by Dr. Zurita.   -Referral to [facility] for Medical Marijuana RX  -Referral to Hem/Onc Psych  -SW referral to assist with available grants for car repair.    Palliative Care Encounter  -HCPOA:  David Acevedo, sister, [phone]  -Code Status: DNR; LaPOST completed 1/6/2022 and uploaded in EMR  -[facility]-Continue cancer treatment with surveillance, symptom management, improve functional status with rehabilitation.      Follow up: 3 months     40 minutes of total time spent on the encounter, which includes face to face time and non-face to face time preparing to see the patient (eg, review of tests), Obtaining and/or reviewing separately obtained history, Documenting clinical information in the electronic or other health record, Independently interpreting results (not separately reported) and communicating results to the patient/family/caregiver, or Care coordination (not separately reported).    Signature: SUZIE GOTTI NP

## 2024-05-09 NOTE — PATIENT INSTRUCTIONS
Try Norco 5mg every 8 hours as needed for pain. 3 doses/day   OK to continue your Xanax  Please take your Xanax 1-2 hours before/after your Norco.  Narcan has been prescribed if there is any concern of opioid overdose.   Signs of opioid overdose include:   Loss of consciousness  Unresponsive to outside stimulus  Awake, but unable to talk  Breathing is very slow and shallow, erratic, or has stopped  For lighter skinned people, the skin tone turns bluish purple, for darker skinned people, it turns grayish or ashen.  Choking sounds, or a snore-like gurgling noise (sometimes called the death rattle)  Vomiting  Body is very limp  Face is very pale or clammy  Fingernails and lips turn blue or purplish black  Pulse (heartbeat) is slow, erratic, or not there at all.   If you administer this medicine immediately go to the nearest emergency department for evaluation and stop taking all opioids.

## 2024-05-10 ENCOUNTER — TELEPHONE (OUTPATIENT)
Dept: PALLIATIVE MEDICINE | Facility: CLINIC | Age: 54
End: 2024-05-10
Payer: MEDICAID

## 2024-05-10 NOTE — TELEPHONE ENCOUNTER
----- Message from Jeaneth Love sent at 5/10/2024 10:50 AM CDT -----  Regarding: Attn: Antonieta  Contact: Josey  .Type:  Needs Medical Advice    Who Called:  Josye  Symptoms (please be specific):    How long has patient had these symptoms:    Pharmacy name and phone #:    Would the patient rather a call back or a response via [facility]? call  Best Call Back Number:  [phone] (home)    Additional Information: Josey is requesting a callback from you today in regard  to the pain medication that was sent to the pharmacy. They will only fill for 7 days. She was asked to let you know if she had problems and you will transfer over to [facility] at [facility].

## 2024-05-10 NOTE — TELEPHONE ENCOUNTER
----- Message from Matt Garibay sent at 5/10/2024  1:28 PM CDT -----  Contact: Josey  .Type:  Patient Returning Call    Who Called:Josey  Who Left Message for Patient:Nurse  Does the patient know what this is regarding?:Yes  Would the patient rather a call back or a response via [facility]? Call back  Best Call Back Number:.[phone]  Additional Information: Na        Thanks  KRYSTAL

## 2024-05-13 ENCOUNTER — DOCUMENTATION ONLY (OUTPATIENT)
Dept: PALLIATIVE MEDICINE | Facility: CLINIC | Age: 54
End: 2024-05-13
Payer: MEDICAID

## 2024-05-13 NOTE — PROGRESS NOTES
Palliative Refill Nurse Note:    Nurse reached out to pt to inform her that her local pharmacy [facility] pharmacy stated she will need a new prescription of [place] because she choose to have a 7 day supply instead of waiting for PA approval, pt was made aware of this and nurse sent EB-provider a note letting her know the pt will need a new script for her Norco.

## 2024-05-14 DIAGNOSIS — G89.3 NEOPLASM RELATED PAIN: ICD-10-CM

## 2024-05-14 DIAGNOSIS — Z17.0 MALIGNANT NEOPLASM OF OVERLAPPING SITES OF RIGHT BREAST IN FEMALE, ESTROGEN RECEPTOR POSITIVE: Primary | ICD-10-CM

## 2024-05-14 DIAGNOSIS — C50.811 MALIGNANT NEOPLASM OF OVERLAPPING SITES OF RIGHT BREAST IN FEMALE, ESTROGEN RECEPTOR POSITIVE: Primary | ICD-10-CM

## 2024-05-14 DIAGNOSIS — C79.81 METASTATIC MALIGNANT NEOPLASM TO BREAST: ICD-10-CM

## 2024-05-14 PROBLEM — F41.9 ANXIETY AND DEPRESSION: Status: ACTIVE | Noted: 2024-05-14

## 2024-05-14 PROBLEM — Z51.5 PALLIATIVE CARE ENCOUNTER: Status: ACTIVE | Noted: 2020-05-01

## 2024-05-14 PROBLEM — F32.A ANXIETY AND DEPRESSION: Status: ACTIVE | Noted: 2024-05-14

## 2024-05-14 RX ORDER — HYDROCODONE BITARTRATE AND ACETAMINOPHEN 5; 325 MG/1; MG/1
1 TABLET ORAL EVERY 8 HOURS PRN
Qty: 66 TABLET | Refills: 0 | Status: SHIPPED | OUTPATIENT
Start: 2024-05-14 | End: 2024-06-11 | Stop reason: DRUGHIGH

## 2024-05-14 NOTE — ASSESSMENT & PLAN NOTE
Anxiety/Depression  Related to Cancer diagnosis and financial stress.   Xanax 0.5mg BID PRN   Continue Duloxetine  Pt decline mental health counseling referral at this time  SW consult to assist with financial resources.

## 2024-05-14 NOTE — PROGRESS NOTES
Palliative Medicine Clinic Note  Follow-up        Consult Requested By: Dr. Donita Ruff      Reason for Consult: Symptom  Management    Chief Complaint: Pain, anxiety/depression, financial stress.           ASSESSMENT/PLAN:      Plan/Recommendations:    Problem List Items Addressed This Visit          Psychiatric    Anxiety and depression     Anxiety/Depression  Related to Cancer diagnosis and financial stress.   Xanax 0.5mg BID PRN   Continue Duloxetine  Pt decline mental health counseling referral at this time  SW consult to assist with financial resources.                Oncology    Malignant neoplasm of overlapping sites of right breast in female, estrogen receptor positive - Primary     -Followed by Dr. Ruff. Previously Dr. Joseph, Dr. Asencio  -On Surveillance- Continues Letrozole and Ibrance  -CT A/P: 05/02/2024: Localized mildly dilated proximal jejunal bowel loops with more normal-caliber   mid to distal small bowel and colon. Findings raise suspicion for proximal   partial small-bowel obstruction versus localized ileus/gastroenteritis.   No evidence of metastatic disease within the abdomen or pelvis.   Stable low-density fatty liver changes.   Stable mixed lytic and sclerotic lesions within the pelvis and T9 marrow space   likely representing treated metastatic disease. No new osseous lesions   identified.   -PET scan 05/03/2023: There is a focal mildly hypermetabolic ground-glass opacity in the left lower lobe which is new from the prior examination. This is more likely infectious/inflammatory in nature. Short-term CT chest follow-up is advised.         Neoplasm related pain     Neoplasm related pain  Uncontrolled-  -Current Regimen: OTC Acetaminophen PRN   -Trial Norco 5mg TID PRN.   -Patient instructed to contact me if 3 day supply of meds left.   -Narcan RX given and explained use to pt and family. Pt and family verbalized understanding.    -Pain Contract-4/18/2023  -UDS- Collected 5/9/2024.  Pending results.          Relevant Medications    HYDROcodone-acetaminophen (NORCO) 5-325 mg per tablet       Palliative Care    Palliative care encounter     -Code status: DNR w/Selective treatment. LaPOST 01/6/2022. Will confirm these details with pt at next visit.   -HCPOA: Sister: David Shuff: [phone]   -GOC:  Continue cancer treatment, symptom management, maintain independence and functional status.   -See past visit notes for further details           Other Visit Diagnoses       Metastatic malignant neoplasm to breast        Relevant Medications    ALPRAZolam (XANAX) 0.5 MG tablet    HYDROcodone-acetaminophen (NORCO) 5-325 mg per tablet                Advance Care Planning   Advance Directives:   Living Will: Yes        Copy on chart: Yes    [place]POST: Yes    Do Not Resuscitate Status: Yes    Medical Power of : Yes    Agent's Name:  Sister: David Acevedo   Agent's Contact Number:  [phone]    Decision Making:  Patient answered questions  Goals of Care: The patient endorses that what is most important right now is to focus on remaining as independent as possible, symptom/pain control, and quality of life, even if it means sacrificing a little time    Accordingly, we have decided that the best plan to meet the patient's goals includes continuing with treatment.        Follow up: 1 month re: pain management    Plan discussed with Dr. Ruff.     SUBJECTIVE:      History of Present Illness / Interval History:  Josey Flores is a 52 y.o. female with history of metastatic breast cancer, thyroid cancer post thyroidectomy (2017), and cervical cancer (2017). She is on surveillance on Letrozole and Ibrance.  S/p stroke with left-sided weakness (June 02, 2023). She is followed by Dr. Ruff.  Presents to Palliative Care Clinic for pain and anxiety.   Please see previous palliative care notes for more details on pt's care so far.       5/9/24:   History obtained from: Patient  and , Mr. Mccallum.    Pt attended clinic with her , Mr. Mccallum. She appeared uncomfortable.   Vital signs stable on room air.   She reported persistent lower abd/pelvic pain and bilateral ribs pain. She has been takin OTC Acetaminophen with no significant effect.   She saw Dr. Zurita (Chronic Pain). He increased Duloxetine and Lyrica, but Norco was discontinued.   She was previously on Norco 10mg QID PRN. Last 30-day refill was Dec. 2023.   She attempted to wean to Norco 5mg TID, but has been unsuccessful.   She has been anxious and depressed due to financial stress. Last Xanax refill Jan. 2024.   No follow-up with her PCP or chronic pain since January 2024.     Past visits:   1/11/2024:   Pt attended clinic with her partner, Mr. Mccallum. She appeared uncomfortable. Vital signs stable on room air.   She has established care with chronic pain provider, Dr. Zurita  She reported she is now taking Lyrica and Duloxetine 60mg daily.   However, she has noted worsening abd/pelvic pain.   She is also attempting to self-wean her Norco 10mg Q6H PRN. She is now taking Norco 5mg Q6H PRN. Self weaning started 4-5 days ago   She is concerned about running out of narcotics and Dr. Zurita not willing to prescribe narcotics. I have explained to her I can not prescribe narcotics as her previous scans have shown no evidence of recurrence/metastasis.   Next CT on Saturday, Jan 13, 2024  Anxiety/Depression due to her dog's recent death.    Unsuccessful with smoking cessation due to stress    ROS:  Review of Systems   Constitutional:  Positive for fatigue. Negative for activity change, appetite change and unexpected weight change.   HENT:  Negative for hearing loss, sore throat, trouble swallowing and voice change.    Eyes:  Negative for visual disturbance.   Respiratory:  Positive for shortness of breath (Intermittent exertional). Negative for cough, chest tightness and wheezing.    Cardiovascular:  Positive for leg swelling (Bilateral- pt states this  is due to taking gabapentin/pregabalin). Negative for chest pain and palpitations.   Gastrointestinal:  Negative for abdominal pain, blood in stool, nausea, rectal pain and vomiting.   Genitourinary:  Negative for bladder incontinence, difficulty urinating, flank pain, hematuria, nocturia, pelvic pain and urgency.   Musculoskeletal:  Positive for arthralgias and myalgias. Negative for neck pain.   Integumentary:  Negative for wound.   Allergic/Immunologic: Negative for environmental allergies, food allergies and immunocompromised state.   Neurological:  Negative for dizziness, tremors, weakness, numbness, headaches, memory loss and coordination difficulties.   Hematological:  Negative for adenopathy. Does not bruise/bleed easily.   Psychiatric/Behavioral:  Positive for agitation, dysphoric mood and sleep disturbance. Negative for behavioral problems, confusion, decreased concentration, self-injury and suicidal ideas. The patient is nervous/anxious.        Review of Symptoms      Symptom Assessment (ESAS 0-10 Scale)  Pain:  6  Dyspnea:  5  Anxiety:  9  Nausea:  0  Depression:  4  Anorexia:  0  Fatigue:  8  Insomnia:  10  Restlessness:  10  Agitation:  10     CAM / Delirium:  Negative  Constipation:  Negative  Diarrhea:  Negative    Anxiety:  Is nervous/anxious    Bowel Management Plan (BMP):  No      Pain Assessment:    Location(s): abdomen    Abdomen       Location: anterior        Quality: Cramping        Quantity: 6/10 in intensity        Chronicity: Onset 1 year(s) ago, unchanged        Aggravating Factors: Movement        Alleviating Factors: Acetaminophen        Associated Symptoms: Myalgias    Modified Osmar Scale:  0    Performance Status:  90    ECOG Performance Status ndGndrndanddndend:nd nd2nd Living Arrangements:  Lives with spouse    Psychosocial/Cultural:   See Palliative Psychosocial Note: Yes  Lives with her significant other of 16 years: they have 3 children combined, 2 sons from previous marriage.    **Primary   to Follow**  Palliative Care  Consult: Yes            Medications:    Current Outpatient Medications:     albuterol (PROVENTIL/VENTOLIN HFA) 90 mcg/actuation inhaler, Inhale 2 puffs into the lungs every 4 (four) hours as needed for Wheezing or Shortness of Breath., Disp: 18 g, Rfl: 11    atorvastatin (LIPITOR) 40 MG tablet, Take 1 tablet (40 mg total) by mouth once daily., Disp: 90 tablet, Rfl: 3    benzonatate (TESSALON) 200 MG capsule, Take 1 capsule (200 mg total) by mouth 3 (three) times daily as needed for Cough., Disp: 90 capsule, Rfl: 1    calcium carbonate 400 mg calcium (1,000 mg) Chew, Take 2,000 mg by mouth once daily., Disp: , Rfl:     clopidogreL (PLAVIX) 75 mg tablet, Take 1 tablet (75 mg total) by mouth once daily., Disp: 30 tablet, Rfl: 11    DULoxetine (CYMBALTA) 60 MG capsule, Take 60 mg by mouth., Disp: , Rfl:     ergocalciferol (ERGOCALCIFEROL) 50,000 unit Cap, Take 1 capsule (50,000 Units total) by mouth every 7 days., Disp: 12 capsule, Rfl: 2    ergocalciferol (VITAMIN D2) 50,000 unit Cap, Take 5,000 Units by mouth once daily at 6am. , Disp: , Rfl:     folic acid (FOLVITE) 1 MG tablet, Take 1 tablet (1 mg total) by mouth once daily., Disp: 100 tablet, Rfl: 3    hydrOXYzine HCL (ATARAX) 25 MG tablet, Take 1 tablet (25 mg total) by mouth 3 (three) times daily as needed for Itching., Disp: 90 tablet, Rfl: 1    ipratropium (ATROVENT) 42 mcg (0.06 %) nasal spray, 2 sprays by Nasal route 4 (four) times daily. As needed for rhinitis. Take in evening before bed and in the morning, Disp: 15 mL, Rfl: 11    letrozole (FEMARA) 2.5 mg Tab, Take 1 tablet (2.5 mg total) by mouth once daily., Disp: 30 tablet, Rfl: 2    levothyroxine (SYNTHROID) 300 MCG Tab, Take 1 tablet (300 mcg total) by mouth before breakfast., Disp: 30 tablet, Rfl: 11    multivitamin (THERAGRAN) per tablet, Take 1 tablet by mouth once daily., Disp: , Rfl:     naloxone (NARCAN) 4 mg/actuation DARREL RicheyG:Both  Jeffreyes, Disp: , Rfl:     nicotine (NICODERM CQ) 14 mg/24 hr, 1 patch., Disp: , Rfl:     nicotine (NICODERM CQ) 21 mg/24 hr, 1 patch., Disp: , Rfl:     nicotine (NICODERM CQ) 7 mg/24 hr, APPLY 1 PATCH TOPICALLY ONCE DAILY AFTER COMPLETING NICOTINE 14 MG PATCHES, Disp: , Rfl:     palbociclib (IBRANCE) 125 mg Cap, Take one capsule (125 mg) by mouth once daily on days 1-21 of each 28-day cycle., Disp: 21 capsule, Rfl: 11    pentoxifylline (TRENTAL) 400 mg TbSR, Take 1 tablet (400 mg total) by mouth 2 (two) times daily with meals., Disp: 60 tablet, Rfl: 11    potassium chloride SA (K-DUR,KLOR-CON) 20 MEQ tablet, Take 1 tablet (20 mEq total) by mouth once daily. Take daily for 3 days., Disp: 3 tablet, Rfl: 1    promethazine-dextromethorphan (PROMETHAZINE-DM) 6.25-15 mg/5 mL Syrp, Take 5 mLs by mouth nightly as needed (For cough)., Disp: 473 mL, Rfl: 0    vitamin E 1000 UNIT capsule, Take 1 capsule (1,000 Units total) by mouth once daily., Disp: 30 capsule, Rfl: 11    ALPRAZolam (XANAX) 0.5 MG tablet, Take 1 tablet (0.5 mg total) by mouth 2 (two) times daily as needed for Anxiety., Disp: 60 tablet, Rfl: 0    HYDROcodone-acetaminophen (NORCO) 5-325 mg per tablet, Take 1 tablet by mouth every 8 (eight) hours as needed for Pain (3 doses/day)., Disp: 90 tablet, Rfl: 0      External  database queried on 05/14/2024  by SUZIE REID :          Review of patient's allergies indicates:   Allergen Reactions    Gabapentin Swelling     Note: unilateral joint edema, unclear if due to gabapentin    Nsaids (non-steroidal anti-inflammatory drug) Other (See Comments)     Instructed not to take NSAID drugs with chemo pill.    Zometa [zoledronic acid] Other (See Comments)     Possible osteonecrosis jaw    Clindamycin Rash    Vancomycin analogues Itching           OBJECTIVE:         Physical Exam:  Vitals: Temp: 98.8 °F (37.1 °C) (05/09/24 0934)  Pulse: 99 (05/09/24 0934)  BP: 109/68 (05/09/24 0934)  SpO2: 99 % (05/09/24  0934)    Physical Exam  Vitals and nursing note reviewed.   Constitutional:       General: She is not in acute distress.     Appearance: Normal appearance. She is normal weight. She is ill-appearing.   HENT:      Head: Normocephalic and atraumatic.      Nose: Nose normal. No congestion or rhinorrhea.      Mouth/Throat:      Mouth: Mucous membranes are moist.      Pharynx: Oropharynx is clear. No oropharyngeal exudate or posterior oropharyngeal erythema.   Eyes:      General: No scleral icterus.        Right eye: No discharge.         Left eye: No discharge.      Conjunctiva/sclera: Conjunctivae normal.      Pupils: Pupils are equal, round, and reactive to light.   Cardiovascular:      Rate and Rhythm: Normal rate and regular rhythm.      Pulses: Normal pulses.      Heart sounds: Normal heart sounds. No murmur heard.     No gallop.   Pulmonary:      Effort: Pulmonary effort is normal. No respiratory distress.      Breath sounds: Normal breath sounds. No stridor. No wheezing.   Chest:      Chest wall: No tenderness.   Abdominal:      General: Bowel sounds are normal. There is no distension.      Palpations: Abdomen is soft.      Tenderness: There is no abdominal tenderness.   Genitourinary:     Comments: Deferred  Musculoskeletal:         General: No swelling or tenderness. Normal range of motion.      Cervical back: Normal range of motion and neck supple.      Right lower leg: Edema present.      Left lower leg: Edema present.   Skin:     General: Skin is warm and dry.      Capillary Refill: Capillary refill takes less than 2 seconds.      Coloration: Skin is not jaundiced or pale.      Findings: No rash.   Neurological:      Mental Status: She is alert and oriented to person, place, and time.      Motor: No weakness.      Gait: Gait abnormal.   Psychiatric:         Attention and Perception: Attention and perception normal.         Mood and Affect: Mood is anxious and depressed.         Speech: Speech normal.          Behavior: Behavior normal.         Thought Content: Thought content normal. Thought content does not include suicidal ideation. Thought content does not include suicidal plan.         Cognition and Memory: Cognition normal.         Judgment: Judgment normal.           Labs: BMP  Lab Results   Component Value Date     04/25/2024    K 3.7 04/25/2024     04/25/2024    CO2 24 04/25/2024    BUN 14 04/25/2024    CREATININE 1.5 (H) 04/25/2024    CALCIUM 9.6 04/25/2024    ANIONGAP 9 04/25/2024    EGFRNORACEVR 41 (A) 04/25/2024     Lab Results   Component Value Date    WBC 5.84 04/25/2024    RBC 3.58 (L) 04/25/2024    HGB 12.5 04/25/2024    HCT 37.6 04/25/2024     (H) 04/25/2024    MCH 34.9 (H) 04/25/2024    MCHC 33.2 04/25/2024    RDW 13.6 04/25/2024     04/25/2024    MPV 11.6 04/25/2024    GRAN 3.4 04/25/2024    GRAN 58.7 04/25/2024    LYMPH 1.6 04/25/2024    LYMPH 28.1 04/25/2024    MONO 0.5 04/25/2024    MONO 8.9 04/25/2024    EOS 0.1 04/25/2024    BASO 0.06 04/25/2024    EOSINOPHIL 2.4 04/25/2024    BASOPHIL 1.0 04/25/2024          Imaging: CT A/P: 05/02/2024: Localized mildly dilated proximal jejunal bowel loops with more normal-caliber   mid to distal small bowel and colon. Findings raise suspicion for proximal   partial small-bowel obstruction versus localized ileus/gastroenteritis.   No evidence of metastatic disease within the abdomen or pelvis.   Stable low-density fatty liver changes.   Stable mixed lytic and sclerotic lesions within the pelvis and T9 marrow space   likely representing treated metastatic disease. No new osseous lesions   identified.      I spent a total of 40 minutes on the day of the visit. This includes face to face time in discussion of goals of care, symptom assessment, coordination of care and emotional support.  This also includes non-face to face time preparing to see the patient (eg, review of tests/imaging), obtaining and/or reviewing separately obtained  history, documenting clinical information in the electronic or other health record, independently interpreting results and communicating results to the patient/family/caregiver, or care coordinator.         SUZIE GOTTI NP

## 2024-05-14 NOTE — ASSESSMENT & PLAN NOTE
-Followed by Dr. Ruff. Previously Dr. Joseph, Dr. Asencio  -On Surveillance- Continues Letrozole and Ibrance  -CT A/P: 05/02/2024: Localized mildly dilated proximal jejunal bowel loops with more normal-caliber   mid to distal small bowel and colon. Findings raise suspicion for proximal   partial small-bowel obstruction versus localized ileus/gastroenteritis.   No evidence of metastatic disease within the abdomen or pelvis.   Stable low-density fatty liver changes.   Stable mixed lytic and sclerotic lesions within the pelvis and T9 marrow space   likely representing treated metastatic disease. No new osseous lesions   identified.   -PET scan 05/03/2023: There is a focal mildly hypermetabolic ground-glass opacity in the left lower lobe which is new from the prior examination. This is more likely infectious/inflammatory in nature. Short-term CT chest follow-up is advised.

## 2024-05-14 NOTE — ASSESSMENT & PLAN NOTE
Neoplasm related pain  Uncontrolled-  -Current Regimen: OTC Acetaminophen PRN   -Trial Norco 5mg TID PRN.   -Patient instructed to contact me if 3 day supply of meds left.   -Narcan RX given and explained use to pt and family. Pt and family verbalized understanding.    -Pain Contract-4/18/2023  -UDS- Collected 5/9/2024. Pending results.

## 2024-05-15 ENCOUNTER — TELEPHONE (OUTPATIENT)
Dept: GASTROENTEROLOGY | Facility: CLINIC | Age: 54
End: 2024-05-15
Payer: MEDICAID

## 2024-05-15 NOTE — TELEPHONE ENCOUNTER
Patient appointment added to the wait-list.      ----- Message from Antonieta Sanches LPN sent at 5/9/2024  2:06 PM CDT -----  Regarding: Patient Request sooner apt  please  Hi can some one with Dr Beckman office please reach out to the pt for a sooner pt I currently seeing you all now in July.

## 2024-05-21 ENCOUNTER — INFUSION (OUTPATIENT)
Dept: INFUSION THERAPY | Facility: HOSPITAL | Age: 54
End: 2024-05-21
Attending: INTERNAL MEDICINE
Payer: MEDICAID

## 2024-05-21 DIAGNOSIS — C50.919 PRIMARY MALIGNANT NEOPLASM OF BREAST WITH METASTASIS TO OTHER SITE, UNSPECIFIED LATERALITY: ICD-10-CM

## 2024-05-21 DIAGNOSIS — C50.811 MALIGNANT NEOPLASM OF OVERLAPPING SITES OF RIGHT BREAST IN FEMALE, ESTROGEN RECEPTOR POSITIVE: Primary | ICD-10-CM

## 2024-05-21 DIAGNOSIS — Z17.0 MALIGNANT NEOPLASM OF OVERLAPPING SITES OF RIGHT BREAST IN FEMALE, ESTROGEN RECEPTOR POSITIVE: Primary | ICD-10-CM

## 2024-05-21 PROCEDURE — 96523 IRRIG DRUG DELIVERY DEVICE: CPT

## 2024-05-21 PROCEDURE — 63600175 PHARM REV CODE 636 W HCPCS

## 2024-05-21 RX ORDER — SODIUM CHLORIDE 0.9 % (FLUSH) 0.9 %
10 SYRINGE (ML) INJECTION
OUTPATIENT
Start: 2024-05-21

## 2024-05-21 RX ORDER — SODIUM CHLORIDE 0.9 % (FLUSH) 0.9 %
10 SYRINGE (ML) INJECTION
Status: DISCONTINUED | OUTPATIENT
Start: 2024-05-21 | End: 2024-05-21 | Stop reason: HOSPADM

## 2024-05-21 RX ORDER — HEPARIN 100 UNIT/ML
500 SYRINGE INTRAVENOUS
Status: DISCONTINUED | OUTPATIENT
Start: 2024-05-21 | End: 2024-05-21 | Stop reason: HOSPADM

## 2024-05-21 RX ORDER — HEPARIN 100 UNIT/ML
500 SYRINGE INTRAVENOUS
OUTPATIENT
Start: 2024-05-21

## 2024-05-21 RX ADMIN — HEPARIN 500 UNITS: 100 SYRINGE at 02:05

## 2024-05-21 NOTE — DISCHARGE INSTRUCTIONS
[facility]  [address] [facility]  [address]  [phone] phone     [phone] fax  Hours of Operation: Monday- Friday 8:00am- 5:00pm  After hours phone  [phone]  Hematology / Oncology Physicians on call      PRASANNA Bauer Dr., NP Phaon Dunbar, NP Khelsea Conley, FNP    Please call with any concerns regarding your appointment today.

## 2024-05-23 ENCOUNTER — TELEPHONE (OUTPATIENT)
Dept: PALLIATIVE MEDICINE | Facility: CLINIC | Age: 54
End: 2024-05-23
Payer: MEDICAID

## 2024-05-23 NOTE — TELEPHONE ENCOUNTER
----- Message from Tommie Arora sent at 5/23/2024  3:06 PM CDT -----  Contact: [phone]  Patient called in requesting a refill on HYDROcodone-acetaminophen (NORCO) 5-325 mg per tablet patient is asking for an HIGHER dose on this medication because felled and broke her ankle  on Friday 5/17 ,she said she might have get screws in her ankle  please call back  [phone]          [facility] [address] [place] [address] [id number] [place] [id number]  Phone: [phone] Fax: [phone]

## 2024-06-11 ENCOUNTER — OFFICE VISIT (OUTPATIENT)
Dept: PALLIATIVE MEDICINE | Facility: CLINIC | Age: 54
End: 2024-06-11
Payer: MEDICAID

## 2024-06-11 ENCOUNTER — OFFICE VISIT (OUTPATIENT)
Dept: HEMATOLOGY/ONCOLOGY | Facility: CLINIC | Age: 54
End: 2024-06-11
Payer: MEDICAID

## 2024-06-11 ENCOUNTER — LAB VISIT (OUTPATIENT)
Dept: LAB | Facility: HOSPITAL | Age: 54
End: 2024-06-11
Attending: INTERNAL MEDICINE
Payer: MEDICAID

## 2024-06-11 ENCOUNTER — TELEPHONE (OUTPATIENT)
Dept: PALLIATIVE MEDICINE | Facility: CLINIC | Age: 54
End: 2024-06-11
Payer: MEDICAID

## 2024-06-11 VITALS
HEIGHT: 67 IN | HEART RATE: 88 BPM | TEMPERATURE: 98 F | OXYGEN SATURATION: 97 % | BODY MASS INDEX: 36.95 KG/M2 | SYSTOLIC BLOOD PRESSURE: 109 MMHG | DIASTOLIC BLOOD PRESSURE: 74 MMHG

## 2024-06-11 DIAGNOSIS — R87.613 HSIL (HIGH GRADE SQUAMOUS INTRAEPITHELIAL LESION) ON PAP SMEAR OF CERVIX: Primary | ICD-10-CM

## 2024-06-11 DIAGNOSIS — C50.811 MALIGNANT NEOPLASM OF OVERLAPPING SITES OF RIGHT BREAST IN FEMALE, ESTROGEN RECEPTOR POSITIVE: Primary | ICD-10-CM

## 2024-06-11 DIAGNOSIS — C77.3 MALIGNANT NEOPLASM METASTATIC TO LYMPH NODE OF AXILLA: ICD-10-CM

## 2024-06-11 DIAGNOSIS — Z17.0 MALIGNANT NEOPLASM OF OVERLAPPING SITES OF RIGHT BREAST IN FEMALE, ESTROGEN RECEPTOR POSITIVE: ICD-10-CM

## 2024-06-11 DIAGNOSIS — C79.51 SECONDARY CANCER OF BONE: ICD-10-CM

## 2024-06-11 DIAGNOSIS — C50.811 MALIGNANT NEOPLASM OF OVERLAPPING SITES OF RIGHT BREAST IN FEMALE, ESTROGEN RECEPTOR POSITIVE: ICD-10-CM

## 2024-06-11 DIAGNOSIS — Z17.0 MALIGNANT NEOPLASM OF OVERLAPPING SITES OF RIGHT BREAST IN FEMALE, ESTROGEN RECEPTOR POSITIVE: Primary | ICD-10-CM

## 2024-06-11 DIAGNOSIS — C50.919 PRIMARY MALIGNANT NEOPLASM OF BREAST WITH METASTASIS TO OTHER SITE, UNSPECIFIED LATERALITY: ICD-10-CM

## 2024-06-11 DIAGNOSIS — E03.9 HYPOTHYROIDISM, UNSPECIFIED TYPE: ICD-10-CM

## 2024-06-11 DIAGNOSIS — G89.3 NEOPLASM RELATED PAIN: ICD-10-CM

## 2024-06-11 DIAGNOSIS — Z51.5 PALLIATIVE CARE ENCOUNTER: ICD-10-CM

## 2024-06-11 DIAGNOSIS — C73 PAPILLARY THYROID CARCINOMA: ICD-10-CM

## 2024-06-11 DIAGNOSIS — S92.901A UNSPECIFIED FRACTURE OF RIGHT FOOT, INITIAL ENCOUNTER FOR CLOSED FRACTURE: ICD-10-CM

## 2024-06-11 DIAGNOSIS — C53.0 MALIGNANT NEOPLASM OF ENDOCERVIX: ICD-10-CM

## 2024-06-11 LAB
ALBUMIN SERPL BCP-MCNC: 3.6 G/DL (ref 3.5–5.2)
ALP SERPL-CCNC: 81 U/L (ref 55–135)
ALT SERPL W/O P-5'-P-CCNC: 17 U/L (ref 10–44)
ANION GAP SERPL CALC-SCNC: 11 MMOL/L (ref 8–16)
AST SERPL-CCNC: 20 U/L (ref 10–40)
BASOPHILS # BLD AUTO: 0.05 K/UL (ref 0–0.2)
BASOPHILS NFR BLD: 0.6 % (ref 0–1.9)
BILIRUB SERPL-MCNC: 0.4 MG/DL (ref 0.1–1)
BUN SERPL-MCNC: 12 MG/DL (ref 6–20)
CALCIUM SERPL-MCNC: 9.4 MG/DL (ref 8.7–10.5)
CHLORIDE SERPL-SCNC: 104 MMOL/L (ref 95–110)
CO2 SERPL-SCNC: 23 MMOL/L (ref 23–29)
CREAT SERPL-MCNC: 1.4 MG/DL (ref 0.5–1.4)
DIFFERENTIAL METHOD BLD: ABNORMAL
EOSINOPHIL # BLD AUTO: 0.2 K/UL (ref 0–0.5)
EOSINOPHIL NFR BLD: 2.7 % (ref 0–8)
ERYTHROCYTE [DISTWIDTH] IN BLOOD BY AUTOMATED COUNT: 13.6 % (ref 11.5–14.5)
EST. GFR  (NO RACE VARIABLE): 44 ML/MIN/1.73 M^2
GLUCOSE SERPL-MCNC: 121 MG/DL (ref 70–110)
HCT VFR BLD AUTO: 38 % (ref 37–48.5)
HGB BLD-MCNC: 12.8 G/DL (ref 12–16)
IMM GRANULOCYTES # BLD AUTO: 0.09 K/UL (ref 0–0.04)
IMM GRANULOCYTES NFR BLD AUTO: 1.2 % (ref 0–0.5)
LYMPHOCYTES # BLD AUTO: 1.9 K/UL (ref 1–4.8)
LYMPHOCYTES NFR BLD: 23.9 % (ref 18–48)
MAGNESIUM SERPL-MCNC: 1.7 MG/DL (ref 1.6–2.6)
MCH RBC QN AUTO: 34.3 PG (ref 27–31)
MCHC RBC AUTO-ENTMCNC: 33.7 G/DL (ref 32–36)
MCV RBC AUTO: 102 FL (ref 82–98)
MONOCYTES # BLD AUTO: 0.6 K/UL (ref 0.3–1)
MONOCYTES NFR BLD: 7.5 % (ref 4–15)
NEUTROPHILS # BLD AUTO: 5 K/UL (ref 1.8–7.7)
NEUTROPHILS NFR BLD: 64.1 % (ref 38–73)
NRBC BLD-RTO: 0 /100 WBC
PHOSPHATE SERPL-MCNC: 3.7 MG/DL (ref 2.7–4.5)
PLATELET # BLD AUTO: 218 K/UL (ref 150–450)
PMV BLD AUTO: 12.2 FL (ref 9.2–12.9)
POTASSIUM SERPL-SCNC: 3.6 MMOL/L (ref 3.5–5.1)
PROT SERPL-MCNC: 7 G/DL (ref 6–8.4)
RBC # BLD AUTO: 3.73 M/UL (ref 4–5.4)
SODIUM SERPL-SCNC: 138 MMOL/L (ref 136–145)
T4 FREE SERPL-MCNC: 0.69 NG/DL (ref 0.71–1.51)
TSH SERPL DL<=0.005 MIU/L-ACNC: 22.02 UIU/ML (ref 0.4–4)
WBC # BLD AUTO: 7.82 K/UL (ref 3.9–12.7)

## 2024-06-11 PROCEDURE — 99214 OFFICE O/P EST MOD 30 MIN: CPT | Mod: S$PBB,,, | Performed by: NURSE PRACTITIONER

## 2024-06-11 PROCEDURE — 36415 COLL VENOUS BLD VENIPUNCTURE: CPT | Performed by: INTERNAL MEDICINE

## 2024-06-11 PROCEDURE — 99999 PR PBB SHADOW E&M-EST. PATIENT-LVL V: CPT | Mod: PBBFAC,,, | Performed by: NURSE PRACTITIONER

## 2024-06-11 PROCEDURE — 80053 COMPREHEN METABOLIC PANEL: CPT | Performed by: INTERNAL MEDICINE

## 2024-06-11 PROCEDURE — 99214 OFFICE O/P EST MOD 30 MIN: CPT | Mod: 95,,,

## 2024-06-11 PROCEDURE — 84443 ASSAY THYROID STIM HORMONE: CPT | Performed by: INTERNAL MEDICINE

## 2024-06-11 PROCEDURE — 84100 ASSAY OF PHOSPHORUS: CPT | Performed by: INTERNAL MEDICINE

## 2024-06-11 PROCEDURE — 3074F SYST BP LT 130 MM HG: CPT | Mod: CPTII,,, | Performed by: NURSE PRACTITIONER

## 2024-06-11 PROCEDURE — 1160F RVW MEDS BY RX/DR IN RCRD: CPT | Mod: CPTII,,, | Performed by: NURSE PRACTITIONER

## 2024-06-11 PROCEDURE — 3078F DIAST BP <80 MM HG: CPT | Mod: CPTII,,, | Performed by: NURSE PRACTITIONER

## 2024-06-11 PROCEDURE — 85025 COMPLETE CBC W/AUTO DIFF WBC: CPT | Performed by: INTERNAL MEDICINE

## 2024-06-11 PROCEDURE — 84439 ASSAY OF FREE THYROXINE: CPT | Performed by: INTERNAL MEDICINE

## 2024-06-11 PROCEDURE — 3008F BODY MASS INDEX DOCD: CPT | Mod: CPTII,,, | Performed by: NURSE PRACTITIONER

## 2024-06-11 PROCEDURE — 83735 ASSAY OF MAGNESIUM: CPT | Performed by: INTERNAL MEDICINE

## 2024-06-11 PROCEDURE — 99215 OFFICE O/P EST HI 40 MIN: CPT | Mod: PBBFAC | Performed by: NURSE PRACTITIONER

## 2024-06-11 PROCEDURE — 1159F MED LIST DOCD IN RCRD: CPT | Mod: CPTII,,, | Performed by: NURSE PRACTITIONER

## 2024-06-11 RX ORDER — HYDROCODONE BITARTRATE AND ACETAMINOPHEN 10; 325 MG/1; MG/1
1 TABLET ORAL EVERY 8 HOURS PRN
Qty: 42 TABLET | Refills: 0 | Status: SHIPPED | OUTPATIENT
Start: 2024-06-11 | End: 2024-06-25

## 2024-06-11 NOTE — TELEPHONE ENCOUNTER
Contacted patient to reschedule 6/11 appointment to virtual. No answer, left voicemail with call back information.

## 2024-06-11 NOTE — TELEPHONE ENCOUNTER
----- Message from Alysia Washington sent at 6/11/2024  9:48 AM CDT -----  Contact: Josey  .Type:  Patient Returning Call    Who Called: Josey   Who Left Message for Patient: Sara   Does the patient know what this is regarding?: Switching appt to virtual   Would the patient rather a call back or a response via [facility]?  Call  Best Call Back Number: .[phone]  Additional Information:

## 2024-06-11 NOTE — PATIENT INSTRUCTIONS
Ok to take Norco 10mg every 8 hours as needed for pain until your foot procedure.   We can go back to Norco 5mg when your foot pain gets better.   We will plan to see you in two weeks after the orthopedic doctor completes your procedure.

## 2024-06-11 NOTE — PROGRESS NOTES
Subjective:       Patient ID: Josey Flores is a 53 y.o. female.    Chief Complaint: review labs. Avastin    HPI: 53 y.o. female here today for follow up of cervical cancer and breast cancer. Patient's current treatment plan includes Ibrance 125 mg PO daily days 1-21 of 28 days cycle, letrozole 2.5 mg PO daily. She reports tolerating treatment well overall without significant side effects. Has chronic pain managed with pain medications.     Interval fall and right foot injury. She notes upcoming visit with orthopedics to discuss surgical management     ?   ONCOLOGIC DIAGNOSIS:       Stage IV cervical squamous cell carcinoma     stage IV, T2 N1b M1, invasive breast carcinoma, ER/NC+, HER2-     Papillary thyroid carcinoma status post total thyroidectomy on 08/31/2017, mpT1b N0      Cervical HSIL MIREILLE 3 s/p LEEP, 2017. Continues surveillance   ?        Social History     Socioeconomic History    Marital status:     Number of children: 2   Tobacco Use    Smoking status: Every Day     Current packs/day: 1.00     Average packs/day: 1 pack/day for 39.4 years (39.4 ttl pk-yrs)     Types: Cigarettes     Start date: 1/1/1985     Passive exposure: Current    Smokeless tobacco: Never    Tobacco comments:     45 pack year history   Substance and Sexual Activity    Alcohol use: No    Drug use: No    Sexual activity: Not Currently     Partners: Male     Birth control/protection: None   Social History Narrative     with two adult children; common law marriage (10+years); raised Buddhist, no current Restorationism practice     Social Determinants of Health     Financial Resource Strain: Medium Risk (5/27/2024)    Received from [facility] and Its Subsidiaries and Affiliates    Overall Financial Resource Strain (CARDIA)     Difficulty of Paying Living Expenses: Somewhat hard   Food Insecurity: No Food Insecurity (5/27/2024)    Received from [facility] [facility] and Its Subsidiaries and Affiliates    Hunger Vital Sign     Worried About Running Out of Food in the Last Year: Never true     Ran Out of Food in the Last Year: Never true   Transportation Needs: No Transportation Needs (5/27/2024)    Received from [facility] and Its Subsidiaries and Affiliates    PRAPARE - Transportation     Lack of Transportation (Medical): No     Lack of Transportation (Non-Medical): No   Physical Activity: Inactive (5/27/2024)    Received [facility]    Exercise Vital Sign     Days of Exercise per Week: 0 days     Minutes of Exercise per Session: 0 min   Stress: Stress Concern Present (5/27/2024)    Received from [facility] and Its Subsidiaries and Affiliates    [employer] - Occupational Stress Questionnaire     Feeling of Stress : Very much   Housing Stability: Unknown (11/27/2023)    Housing Stability Vital Sign     Unable to Pay for Housing in the Last Year: No     Unstable Housing in the Last Year: No       Past Medical History:   Diagnosis Date    Anxiety     Asthma     Breast cancer 2017    Cancer     right breast, metastatic-lymph nodes, bone, and thyroid     COPD (chronic obstructive pulmonary disease)     Depression     History of psychiatric hospitalization     2000; suicidal ideation    Hx of psychiatric care     Hypothyroidism     Miscarriage     five miscarriages    Obesity     Psychiatric problem     Thyroid cancer 2017       Family History   Problem Relation Name Age of Onset    Arthritis Mother Beatrice     Early death Mother Beatrice         64 at time of death    Heart attack Mother Beatrice     Heart disease Mother Beatrice     Heart failure Mother Beatrice     Hypertension Mother Beatrice     Coronary artery disease Father David     Hearing loss Father David     Heart disease Father  David     Hyperlipidemia Father David     Hypertension Father David     Mental illness Father David     Learning disabilities Son Keyur     Cancer Maternal Aunt         Past Surgical History:   Procedure Laterality Date    ABSCESS DRAINAGE      bilateral axilla    ADENOIDECTOMY      APPENDECTOMY      BREAST BIOPSY Right     x3    CHOLECYSTECTOMY      ENDOBRONCHIAL ULTRASOUND Bilateral 02/18/2021    Procedure: ENDOBRONCHIAL ULTRASOUND (EBUS);  Surgeon: Jaron Ni MD;  Location: [facility] ENDO;  Service: Pulmonary;  Laterality: Bilateral;    FLUOROSCOPY N/A 01/28/2021    Procedure: Mediport placement;  Surgeon: Noah Phelan MD;  Location: [facility] CATH LAB;  Service: General;  Laterality: N/A;    MASS EXCISION      MEDIPORT INSERTION, SINGLE Right 02/2021    SKIN GRAFT  06/16/2017    THYROIDECTOMY Bilateral     TONSILLECTOMY         Review of Systems   Constitutional:  Negative for activity change, appetite change, chills, diaphoresis, fatigue, fever and unexpected weight change.   HENT:  Negative for congestion, mouth sores, nosebleeds, sore throat, trouble swallowing and voice change.    Eyes:  Negative for visual disturbance.   Respiratory:  Negative for cough, chest tightness, shortness of breath and wheezing.    Cardiovascular:  Negative for chest pain and leg swelling.   Gastrointestinal:  Negative for abdominal distention, abdominal pain, blood in stool, constipation, diarrhea, nausea and vomiting.   Genitourinary:  Negative for difficulty urinating, dysuria and hematuria.   Musculoskeletal:  Positive for arthralgias. Negative for back pain and myalgias.   Skin:  Negative for pallor, rash and wound.   Neurological:  Negative for dizziness, syncope, weakness and headaches.   Hematological:  Negative for adenopathy. Does not bruise/bleed easily.   Psychiatric/Behavioral:  The patient is not nervous/anxious.          Medication List with Changes/Refills   New Medications    HYDROCODONE-ACETAMINOPHEN  (NORCO)  MG PER TABLET    Take 1 tablet by mouth every 8 (eight) hours as needed for Pain (3 doses/day).   Current Medications    ALBUTEROL (PROVENTIL/VENTOLIN HFA) 90 MCG/ACTUATION INHALER    Inhale 2 puffs into the lungs every 4 (four) hours as needed for Wheezing or Shortness of Breath.    ALPRAZOLAM (XANAX) 0.5 MG TABLET    Take 1 tablet (0.5 mg total) by mouth 2 (two) times daily as needed for Anxiety.    ATORVASTATIN (LIPITOR) 40 MG TABLET    Take 1 tablet (40 mg total) by mouth once daily.    CALCIUM CARBONATE 400 MG CALCIUM (1,000 MG) CHEW    Take 2,000 mg by mouth once daily.    CLOPIDOGREL (PLAVIX) 75 MG TABLET    Take 1 tablet (75 mg total) by mouth once daily.    DULOXETINE (CYMBALTA) 60 MG CAPSULE    Take 60 mg by mouth.    ERGOCALCIFEROL (ERGOCALCIFEROL) 50,000 UNIT CAP    Take 1 capsule (50,000 Units total) by mouth every 7 days.    ERGOCALCIFEROL (VITAMIN D2) 50,000 UNIT CAP    Take 5,000 Units by mouth once daily at 6am.     FOLIC ACID (FOLVITE) 1 MG TABLET    Take 1 tablet (1 mg total) by mouth once daily.    HYDROXYZINE HCL (ATARAX) 25 MG TABLET    Take 1 tablet (25 mg total) by mouth 3 (three) times daily as needed for Itching.    IPRATROPIUM (ATROVENT) 42 MCG (0.06 %) NASAL SPRAY    2 sprays by Nasal route 4 (four) times daily. As needed for rhinitis. Take in evening before bed and in the morning    LETROZOLE (FEMARA) 2.5 MG TAB    Take 1 tablet (2.5 mg total) by mouth once daily.    LEVOTHYROXINE (SYNTHROID) 300 MCG TAB    Take 1 tablet (300 mcg total) by mouth before breakfast.    MULTIVITAMIN (THERAGRAN) PER TABLET    Take 1 tablet by mouth once daily.    NALOXONE (NARCAN) 4 MG/ACTUATION SPRY    SMARTSIG:Both Nares    NICOTINE (NICODERM CQ) 14 MG/24 HR    1 patch.    NICOTINE (NICODERM CQ) 21 MG/24 HR    1 patch.    NICOTINE (NICODERM CQ) 7 MG/24 HR    APPLY 1 PATCH TOPICALLY ONCE DAILY AFTER COMPLETING NICOTINE 14 MG PATCHES    PALBOCICLIB (IBRANCE) 125 MG CAP    Take one capsule (125  mg) by mouth once daily on days 1-21 of each 28-day cycle.    PENTOXIFYLLINE (TRENTAL) 400 MG TBSR    Take 1 tablet (400 mg total) by mouth 2 (two) times daily with meals.    POTASSIUM CHLORIDE SA (K-DUR,KLOR-CON) 20 MEQ TABLET    Take 1 tablet (20 mEq total) by mouth once daily. Take daily for 3 days.    VITAMIN E 1000 UNIT CAPSULE    Take 1 capsule (1,000 Units total) by mouth once daily.     Objective:     Vitals:    06/11/24 0959   BP: 109/74   Pulse: 88   Temp: 97.8 °F (36.6 °C)     Lab Results   Component Value Date    WBC 7.82 06/11/2024    HGB 12.8 06/11/2024    HCT 38.0 06/11/2024     (H) 06/11/2024     06/11/2024       BMP  Lab Results   Component Value Date     06/11/2024    K 3.6 06/11/2024     06/11/2024    CO2 23 06/11/2024    BUN 12 06/11/2024    CREATININE 1.4 06/11/2024    CALCIUM 9.4 06/11/2024    ANIONGAP 11 06/11/2024    ESTGFRAFRICA 60 07/23/2022    EGFRNONAA 52 (A) 07/23/2022     Lab Results   Component Value Date    ALT 17 06/11/2024    AST 20 06/11/2024    ALKPHOS 81 06/11/2024    BILITOT 0.4 06/11/2024         Physical Exam  Vitals reviewed.   Constitutional:       Appearance: She is well-developed.   HENT:      Head: Normocephalic.      Right Ear: External ear normal.      Left Ear: External ear normal.   Eyes:      General: Lids are normal. No scleral icterus.        Right eye: No discharge.         Left eye: No discharge.      Conjunctiva/sclera: Conjunctivae normal.   Neck:      Thyroid: No thyroid mass.   Cardiovascular:      Rate and Rhythm: Normal rate and regular rhythm.      Heart sounds: Normal heart sounds. No murmur heard.  Pulmonary:      Effort: Pulmonary effort is normal. No respiratory distress.      Breath sounds: Normal breath sounds. No wheezing or rales.   Abdominal:      General: Bowel sounds are normal. There is no distension.      Palpations: Abdomen is soft.      Tenderness: There is no abdominal tenderness.   Genitourinary:     Comments:  deferred  Musculoskeletal:         General: Normal range of motion.      Cervical back: Normal range of motion.   Lymphadenopathy:      Head:      Right side of head: No submandibular, preauricular or posterior auricular adenopathy.      Left side of head: No submandibular, preauricular or posterior auricular adenopathy.      Cervical:      Right cervical: No superficial cervical adenopathy.     Left cervical: No superficial cervical adenopathy.   Skin:     General: Skin is warm and dry.   Neurological:      Mental Status: She is alert and oriented to person, place, and time.   Psychiatric:         Speech: Speech normal.         Behavior: Behavior normal. Behavior is cooperative.         Thought Content: Thought content normal.        Assessment:     Problem List Items Addressed This Visit          Renal/    HSIL (high grade squamous intraepithelial lesion) on Pap smear of cervix - Primary     Continues surveillance             Oncology    Malignant neoplasm of overlapping sites of right breast in female, estrogen receptor positive      Cancer Staging   Malignant neoplasm metastatic to lymph node of axilla  Staging form: Hodgkin and Non-Hodgkin Lymphoma, AJCC 7th Edition  - Clinical: No stage assigned - Unsigned    Malignant neoplasm of overlapping sites of right breast in female, estrogen receptor positive  Staging form: Onc Breast AJCC V7  - Clinical: Stage IV (T2, N2b, M1) - Signed by Luis Joseph MD on 3/7/2018    Papillary thyroid carcinoma  Staging form: Thyroid - Papillary or Follicular, AJCC 7th Edition  - Clinical stage from 9/18/2017: T1b(2), N0 - Signed by Zaid Baugh MD on 9/18/2017        Labs unremarkable. No concerning cytopenias or other lab findings.     Patient in week 1 of current IBRANCE cycle 21 days on 7 days off. Letrozole daily. Arrange next f/u with labs prior and port flush week of 7/15/2024 to line up with off week    --patient fell and injured right ankle. She notes planning  surgery soon with orthopedics. I have asked that she follow up with us once she has a surgery date for instructions regarding her Ibrance. Patient verbalizes understanding and is in agreement           Malignant neoplasm metastatic to lymph node of axilla     Continue current treatment          Secondary cancer of bone     Continue current treatment. Previously developed osteonecrosis on bisphosphonate          Papillary thyroid carcinoma     ENT follow up. S/p total thyroidectomy          Malignant neoplasm of endocervix     Continue surveillance          Primary breast cancer with metastasis to other site     Continue Ibrance and Letrozole.              Plan:     HSIL (high grade squamous intraepithelial lesion) on Pap smear of cervix    Malignant neoplasm of overlapping sites of right breast in female, estrogen receptor positive    Malignant neoplasm metastatic to lymph node of axilla    Secondary cancer of bone    Papillary thyroid carcinoma    Malignant neoplasm of endocervix    Primary malignant neoplasm of breast with metastasis to other site, unspecified laterality            Med Onc Chart Routing      Follow up with physician Other. week of July 15 mid end of the week prior to starting next Ibrance cycle   Follow up with [facility]    Infusion scheduling note   mediport flush   Injection scheduling note    Labs CBC, CMP, magnesium, phosphorus, TSH and free T4   Scheduling:  Preferred lab:  Lab interval:     Imaging None      Pharmacy appointment No pharmacy appointment needed      Other referrals       No additional referrals needed            Neida Dee, BLASP-C

## 2024-06-11 NOTE — PROGRESS NOTES
Palliative Medicine Clinic Note  Follow-up        Consult Requested By: No ref. provider found      Reason for Consult: Symptom  Management    Chief Complaint: Pain, anxiety/depression, financial stress.           ASSESSMENT/PLAN:      Plan/Recommendations:    Problem List Items Addressed This Visit          Oncology    Malignant neoplasm of overlapping sites of right breast in female, estrogen receptor positive - Primary     -Followed by Dr. Ruff. Previously Dr. Joseph, Dr. Asencio  -On Surveillance- Continues Letrozole and Ibrance  -CT A/P: 05/02/2024: Localized mildly dilated proximal jejunal bowel loops with more normal-caliber   mid to distal small bowel and colon. Findings raise suspicion for proximal   partial small-bowel obstruction versus localized ileus/gastroenteritis.   No evidence of metastatic disease within the abdomen or pelvis.   Stable low-density fatty liver changes.   Stable mixed lytic and sclerotic lesions within the pelvis and T9 marrow space   likely representing treated metastatic disease. No new osseous lesions   identified.   -PET scan 05/03/2023: There is a focal mildly hypermetabolic ground-glass opacity in the left lower lobe which is new from the prior examination. This is more likely infectious/inflammatory in nature. Short-term CT chest follow-up is advised.         Relevant Medications    HYDROcodone-acetaminophen (NORCO)  mg per tablet    Neoplasm related pain     Neoplasm related pain  Uncontrolled- Due to right foot fracture.   -Current Regimen: OTC Acetaminophen PRN   -Increased to Norco 10mg TID PRN x2 weeks due to right foot fracture.   -Pt followed by Dr. Prince at  Orthopedics.   -Pt to undergo right ankle fixation on June 13, 2024.   -Plans to wean Norco back to 5mg TID PRN once right foot fracture pain improves.   -Patient instructed to contact me if 3 day supply of meds left.   -Narcan RX given and explained use to pt and family. Pt and family verbalized  understanding.    -Pain Contract-4/18/2023  -UDS- Collected 5/9/2024. Pending results.          Relevant Medications    HYDROcodone-acetaminophen (NORCO)  mg per tablet       Palliative Care    Palliative care encounter     -Code status: DNR w/Selective treatment. [place]POST 01/6/2022.   -HCPOA: Sister: David Shuff: [phone]   -GOC:  Continue cancer treatment, symptom management, maintain independence and functional status.   -See past visit notes for further details           Other Visit Diagnoses       Unspecified fracture of right foot, initial encounter for closed fracture        Relevant Medications    HYDROcodone-acetaminophen (NORCO)  mg per tablet                  Advance Care Planning   Advance Directives:   Living Will: Yes        Copy on chart: Yes    [place]POST: Yes    Do Not Resuscitate Status: Yes    Medical Power of : Yes    Agent's Name:  Sister: David Acevedo   Agent's Contact Number:  [phone]    Decision Making:  Patient answered questions  Goals of Care: The patient endorses that what is most important right now is to focus on remaining as independent as possible, symptom/pain control, and quality of life, even if it means sacrificing a little time    Accordingly, we have decided that the best plan to meet the patient's goals includes continuing with treatment.        Follow up: 1 month re: pain management      Plan discussed with Dr. Ruff/patient    SUBJECTIVE:      History of Present Illness / Interval History:  Josey Flores is a 52 y.o. female with history of metastatic breast cancer, thyroid cancer post thyroidectomy (2017), and cervical cancer (2017). She is on surveillance on Letrozole and Ibrance.  S/p stroke with left-sided weakness (June 02, 2023).   She is followed by Dr. Ruff.  Presents to Palliative Care Clinic for pain and anxiety.   Please see previous palliative care notes for more details on pt's care so far.       6/11/24: History obtained from:  Patient  Pt seen via audiovisual feed. A&O x3. Seen sitting in a car. No acute distress.   She reported persistent right ankle pain due to right foot fracture on May 16, 2024  She has been taking Norco 10mg Q6-8H PRN due to worsening pain.   She reported she is scheduled to undergo fixation of the right ankle on June 13th, 2024 by Dr. Prince at [facility].   Her mood is stable  on Duloxetine and Xanax PRN.     Past visits:     05/09/2024: History obtained from: Patient  and , Mr. Mccallum.   Pt attended clinic with her , Mr. Mccallum. She appeared uncomfortable.   Vital signs stable on room air.   She reported persistent lower abd/pelvic pain and bilateral ribs pain. She has been takin OTC Acetaminophen with no significant effect.   She saw Dr. Zurita (Chronic Pain). He increased Duloxetine and Lyrica, but Norco was discontinued.   She was previously on Norco 10mg QID PRN. Last 30-day refill was Dec. 2023.   She attempted to wean to Norco 5mg TID, but has been unsuccessful.   She has been anxious and depressed due to financial stress. Last Xanax refill Jan. 2024.   No follow-up with her PCP or chronic pain since January 2024.       1/11/2024:   Pt attended clinic with her partner, Mr. Mccallum. She appeared uncomfortable. Vital signs stable on room air.   She has established care with chronic pain provider, Dr. Zurita  She reported she is now taking Lyrica and Duloxetine 60mg daily.   However, she has noted worsening abd/pelvic pain.   She is also attempting to self-wean her Norco 10mg Q6H PRN. She is now taking Norco 5mg Q6H PRN. Self weaning started 4-5 days ago   She is concerned about running out of narcotics and Dr. Zurita not willing to prescribe narcotics. I have explained to her I can not prescribe narcotics as her previous scans have shown no evidence of recurrence/metastasis.   Next CT on Saturday, Jan 13, 2024  Anxiety/Depression due to her dog's recent death.    Unsuccessful with  smoking cessation due to stress    ROS:  Review of Systems   Constitutional:  Positive for fatigue. Negative for activity change, appetite change and unexpected weight change.   HENT:  Negative for hearing loss, sore throat, trouble swallowing and voice change.    Eyes:  Negative for visual disturbance.   Respiratory:  Negative for cough, chest tightness, shortness of breath and wheezing.    Cardiovascular:  Positive for leg swelling (Bilateral- pt states this is due to taking gabapentin/pregabalin). Negative for chest pain and palpitations.   Gastrointestinal:  Negative for abdominal pain, blood in stool, nausea, rectal pain and vomiting.   Genitourinary:  Negative for bladder incontinence, difficulty urinating, flank pain, hematuria, nocturia, pelvic pain and urgency.   Musculoskeletal:  Positive for arthralgias, leg pain (Right ankle/foot) and myalgias. Negative for neck pain.   Integumentary:  Negative for wound.   Allergic/Immunologic: Negative for environmental allergies, food allergies and immunocompromised state.   Neurological:  Negative for dizziness, tremors, weakness, numbness, headaches, memory loss and coordination difficulties.   Hematological:  Negative for adenopathy. Does not bruise/bleed easily.   Psychiatric/Behavioral:  Positive for sleep disturbance. Negative for agitation, behavioral problems, confusion, decreased concentration, dysphoric mood, self-injury and suicidal ideas. The patient is nervous/anxious (exacerbated by recent r ankle fx).        Review of Symptoms      Symptom Assessment (ESAS 0-10 Scale)  Pain:  9  Dyspnea:  0  Anxiety:  0  Nausea:  0  Depression:  0  Anorexia:  0  Fatigue:  0  Insomnia:  0  Restlessness:  0  Agitation:  0     CAM / Delirium:  Negative  Constipation:  Negative  Diarrhea:  Negative    Anxiety:  Is nervous/anxious (exacerbated by recent r ankle fx)    Bowel Management Plan (BMP):  No      Pain Assessment:    Location(s): abdomen and ankle    Abdomen        Location: anterior        Quality: Cramping        Quantity: 6/10 in intensity        Chronicity: Onset 1 year(s) ago, unchanged        Aggravating Factors: Movement        Alleviating Factors: Acetaminophen        Associated Symptoms: Myalgias  Ankle       Location: right       Quality: Aching and pressure-like        Quantity: 9/10 in intensity        Chronicity: Onset 3 week(s) ago, gradually worsening since Right ankle fracture on May 16th, 2024        Aggravating Factors: Pressure and movement        Alleviating Factors: Opiates        Associated Symptoms: Myalgias and arthralgias    Modified Osmar Scale:  0    Performance Status:  90    ECOG Performance Status ndGndrndanddndend:nd nd2nd Living Arrangements:  Lives with spouse    Psychosocial/Cultural:   See Palliative Psychosocial Note: Yes  Lives with her significant other of 16 years: they have 3 children combined, 2 sons from previous marriage.    **Primary  to Follow**  Palliative Care  Consult: Yes            Medications:    Current Outpatient Medications:     albuterol (PROVENTIL/VENTOLIN HFA) 90 mcg/actuation inhaler, Inhale 2 puffs into the lungs every 4 (four) hours as needed for Wheezing or Shortness of Breath., Disp: 18 g, Rfl: 11    ALPRAZolam (XANAX) 0.5 MG tablet, Take 1 tablet (0.5 mg total) by mouth 2 (two) times daily as needed for Anxiety., Disp: 60 tablet, Rfl: 0    atorvastatin (LIPITOR) 40 MG tablet, Take 1 tablet (40 mg total) by mouth once daily., Disp: 90 tablet, Rfl: 3    calcium carbonate 400 mg calcium (1,000 mg) Chew, Take 2,000 mg by mouth once daily., Disp: , Rfl:     clopidogreL (PLAVIX) 75 mg tablet, Take 1 tablet (75 mg total) by mouth once daily., Disp: 30 tablet, Rfl: 11    DULoxetine (CYMBALTA) 60 MG capsule, Take 60 mg by mouth., Disp: , Rfl:     ergocalciferol (ERGOCALCIFEROL) 50,000 unit Cap, Take 1 capsule (50,000 Units total) by mouth every 7 days., Disp: 12 capsule, Rfl: 2    ergocalciferol (VITAMIN D2)  50,000 unit Cap, Take 5,000 Units by mouth once daily at 6am. , Disp: , Rfl:     folic acid (FOLVITE) 1 MG tablet, Take 1 tablet (1 mg total) by mouth once daily., Disp: 100 tablet, Rfl: 3    HYDROcodone-acetaminophen (NORCO)  mg per tablet, Take 1 tablet by mouth every 8 (eight) hours as needed for Pain (3 doses/day)., Disp: 42 tablet, Rfl: 0    hydrOXYzine HCL (ATARAX) 25 MG tablet, Take 1 tablet (25 mg total) by mouth 3 (three) times daily as needed for Itching., Disp: 90 tablet, Rfl: 1    ipratropium (ATROVENT) 42 mcg (0.06 %) nasal spray, 2 sprays by Nasal route 4 (four) times daily. As needed for rhinitis. Take in evening before bed and in the morning, Disp: 15 mL, Rfl: 11    letrozole (FEMARA) 2.5 mg Tab, Take 1 tablet (2.5 mg total) by mouth once daily., Disp: 30 tablet, Rfl: 2    levothyroxine (SYNTHROID) 300 MCG Tab, Take 1 tablet (300 mcg total) by mouth before breakfast., Disp: 30 tablet, Rfl: 11    multivitamin (THERAGRAN) per tablet, Take 1 tablet by mouth once daily., Disp: , Rfl:     naloxone (NARCAN) 4 mg/actuation Spry, SMARTSIG:Both Nares, Disp: , Rfl:     palbociclib (IBRANCE) 125 mg Cap, Take one capsule (125 mg) by mouth once daily on days 1-21 of each 28-day cycle., Disp: 21 capsule, Rfl: 11    pentoxifylline (TRENTAL) 400 mg TbSR, Take 1 tablet (400 mg total) by mouth 2 (two) times daily with meals., Disp: 60 tablet, Rfl: 11    potassium chloride SA (K-DUR,KLOR-CON) 20 MEQ tablet, Take 1 tablet (20 mEq total) by mouth once daily. Take daily for 3 days., Disp: 3 tablet, Rfl: 1    vitamin E 1000 UNIT capsule, Take 1 capsule (1,000 Units total) by mouth once daily., Disp: 30 capsule, Rfl: 11      External  database queried on 06/19/2024  by SUZIE REID :        Review of patient's allergies indicates:   Allergen Reactions    Gabapentin Swelling     Note: unilateral joint edema, unclear if due to gabapentin    Nsaids (non-steroidal anti-inflammatory drug) Other (See  Comments)     Instructed not to take NSAID drugs with chemo pill.    Zometa [zoledronic acid] Other (See Comments)     Possible osteonecrosis jaw    Clindamycin Rash    Vancomycin analogues Itching           OBJECTIVE:         Physical Exam:  Vitals:      Physical Exam  Constitutional:       General: She is not in acute distress.     Appearance: Normal appearance. She is ill-appearing.   HENT:      Head: Normocephalic.   Eyes:      General: No scleral icterus.        Right eye: No discharge.         Left eye: No discharge.   Pulmonary:      Effort: Pulmonary effort is normal. No respiratory distress.   Neurological:      Mental Status: She is alert and oriented to person, place, and time.   Psychiatric:         Attention and Perception: Attention and perception normal.         Mood and Affect: Mood normal.         Speech: Speech normal.         Behavior: Behavior normal. Behavior is cooperative.         Thought Content: Thought content normal.         Cognition and Memory: Cognition and memory normal.         Judgment: Judgment normal.           Labs: BMP  Lab Results   Component Value Date     06/11/2024    K 3.6 06/11/2024     06/11/2024    CO2 23 06/11/2024    BUN 12 06/11/2024    CREATININE 1.4 06/11/2024    CALCIUM 9.4 06/11/2024    ANIONGAP 11 06/11/2024    EGFRNORACEVR 44 (A) 06/11/2024     Lab Results   Component Value Date    WBC 7.82 06/11/2024    RBC 3.73 (L) 06/11/2024    HGB 12.8 06/11/2024    HCT 38.0 06/11/2024     (H) 06/11/2024    MCH 34.3 (H) 06/11/2024    MCHC 33.7 06/11/2024    RDW 13.6 06/11/2024     06/11/2024    MPV 12.2 06/11/2024    GRAN 5.0 06/11/2024    GRAN 64.1 06/11/2024    LYMPH 1.9 06/11/2024    LYMPH 23.9 06/11/2024    MONO 0.6 06/11/2024    MONO 7.5 06/11/2024    EOS 0.2 06/11/2024    BASO 0.05 06/11/2024    EOSINOPHIL 2.7 06/11/2024    BASOPHIL 0.6 06/11/2024          Imaging: CT A/P: 05/02/2024: Localized mildly dilated proximal jejunal bowel loops with  more normal-caliber   mid to distal small bowel and colon. Findings raise suspicion for proximal   partial small-bowel obstruction versus localized ileus/gastroenteritis.   No evidence of metastatic disease within the abdomen or pelvis.   Stable low-density fatty liver changes.   Stable mixed lytic and sclerotic lesions within the pelvis and T9 marrow space   likely representing treated metastatic disease. No new osseous lesions   identified.      I spent a total of 35 minutes on the day of the visit. This includes face to face time in discussion of goals of care, symptom assessment, coordination of care and emotional support.  This also includes non-face to face time preparing to see the patient (eg, review of tests/imaging), obtaining and/or reviewing separately obtained history, documenting clinical information in the electronic or other health record, independently interpreting results and communicating results to the patient/family/caregiver, or care coordinator.         SUZIE GOTTI NP

## 2024-06-11 NOTE — ASSESSMENT & PLAN NOTE
Cancer Staging   Malignant neoplasm metastatic to lymph node of axilla  Staging form: Hodgkin and Non-Hodgkin Lymphoma, AJCC 7th Edition  - Clinical: No stage assigned - Unsigned    Malignant neoplasm of overlapping sites of right breast in female, estrogen receptor positive  Staging form: Onc Breast AJCC V7  - Clinical: Stage IV (T2, N2b, M1) - Signed by Luis Joseph MD on 3/7/2018    Papillary thyroid carcinoma  Staging form: Thyroid - Papillary or Follicular, AJCC 7th Edition  - Clinical stage from 9/18/2017: T1b(2), N0 - Signed by Zaid Baugh MD on 9/18/2017        Labs unremarkable. No concerning cytopenias or other lab findings.     Patient in week 1 of current IBRANCE cycle 21 days on 7 days off. Letrozole daily. Arrange next f/u with labs prior and port flush week of 7/15/2024 to line up with off week    --patient fell and injured right ankle. She notes planning surgery soon with orthopedics. I have asked that she follow up with us once she has a surgery date for instructions regarding her Ibrance. Patient verbalizes understanding and is in agreement

## 2024-06-11 NOTE — TELEPHONE ENCOUNTER
Returned patient phone call. Informed her that provider out and offered a virtual. Patient accepted and had no other issues at this time.

## 2024-06-13 ENCOUNTER — TELEPHONE (OUTPATIENT)
Dept: INTERNAL MEDICINE | Facility: CLINIC | Age: 54
End: 2024-06-13
Payer: MEDICAID

## 2024-06-13 ENCOUNTER — TELEPHONE (OUTPATIENT)
Dept: HEMATOLOGY/ONCOLOGY | Facility: CLINIC | Age: 54
End: 2024-06-13
Payer: MEDICAID

## 2024-06-13 ENCOUNTER — PATIENT MESSAGE (OUTPATIENT)
Dept: HEMATOLOGY/ONCOLOGY | Facility: CLINIC | Age: 54
End: 2024-06-13
Payer: MEDICAID

## 2024-06-13 NOTE — TELEPHONE ENCOUNTER
Swer called pt ([phone]) to discuss most recent distress score of 8 from appt on 6/11/24 with ANA PAULA Rogers. Swer received no telephone pickup. Swer was able to leave a  for a call back. Swer to send pt Authentic Responset message as well. Swer will remain available.           6/11/2024     9:58 AM 6/7/2024    10:56 AM 4/25/2024     7:41 AM 4/24/2024     5:39 PM 4/21/2024    10:29 PM 3/20/2024    10:39 AM 2/13/2024     8:12 AM   DISTRESS SCREENING   Distress Score 8 5 0 - No Distress 4 4 5 2   Practical Concerns  None of these None of these None of these None of these None of these None of these   Social Concerns  None of these None of these None of these None of these None of these    Emotional Concerns Worry or anxiety;Sadness or depression;Anger Worry or anxiety;Sadness or depression;Fear None of these Worry or anxiety Worry or anxiety Worry or anxiety;Sadness or depression Sadness or depression   Spiritual or Alevism Concerns  None of these None of these None of these None of these None of these None of these   Physical Concerns Fatigue;Sleep Pain;Memory or concentration Fatigue;Sleep;Pain Pain;Sleep;Tobacco use;Memory or concentration Pain;Sleep;Tobacco use;Memory or concentration Pain;Memory or concentration Pain;Sleep;Fatigue;Memory or concentration;Changes in eating;Loss or change of physical abilities   Other Problems  No  None

## 2024-06-14 ENCOUNTER — PATIENT MESSAGE (OUTPATIENT)
Dept: HEMATOLOGY/ONCOLOGY | Facility: CLINIC | Age: 54
End: 2024-06-14
Payer: MEDICAID

## 2024-06-14 ENCOUNTER — TELEPHONE (OUTPATIENT)
Dept: INTERNAL MEDICINE | Facility: CLINIC | Age: 54
End: 2024-06-14

## 2024-06-14 ENCOUNTER — TELEPHONE (OUTPATIENT)
Dept: HEMATOLOGY/ONCOLOGY | Facility: CLINIC | Age: 54
End: 2024-06-14
Payer: MEDICAID

## 2024-06-14 ENCOUNTER — OFFICE VISIT (OUTPATIENT)
Dept: INTERNAL MEDICINE | Facility: CLINIC | Age: 54
End: 2024-06-14
Payer: MEDICAID

## 2024-06-14 VITALS
DIASTOLIC BLOOD PRESSURE: 70 MMHG | HEIGHT: 67 IN | OXYGEN SATURATION: 97 % | BODY MASS INDEX: 37.02 KG/M2 | SYSTOLIC BLOOD PRESSURE: 118 MMHG | TEMPERATURE: 98 F | HEART RATE: 88 BPM | WEIGHT: 235.88 LBS

## 2024-06-14 DIAGNOSIS — R26.9 GAIT ABNORMALITY: ICD-10-CM

## 2024-06-14 DIAGNOSIS — S82.51XD CLOSED DISPLACED FRACTURE OF MEDIAL MALLEOLUS OF RIGHT TIBIA WITH ROUTINE HEALING, SUBSEQUENT ENCOUNTER: ICD-10-CM

## 2024-06-14 DIAGNOSIS — Z01.818 PREOPERATIVE CLEARANCE: Primary | ICD-10-CM

## 2024-06-14 DIAGNOSIS — I63.9 CEREBROVASCULAR ACCIDENT (CVA), UNSPECIFIED MECHANISM: ICD-10-CM

## 2024-06-14 DIAGNOSIS — C53.9 RECURRENT CERVICAL CANCER: ICD-10-CM

## 2024-06-14 DIAGNOSIS — N18.32 STAGE 3B CHRONIC KIDNEY DISEASE: ICD-10-CM

## 2024-06-14 DIAGNOSIS — Z12.11 COLON CANCER SCREENING: ICD-10-CM

## 2024-06-14 DIAGNOSIS — C50.919 PRIMARY MALIGNANT NEOPLASM OF BREAST WITH METASTASIS TO OTHER SITE, UNSPECIFIED LATERALITY: ICD-10-CM

## 2024-06-14 PROCEDURE — 1159F MED LIST DOCD IN RCRD: CPT | Mod: CPTII,,,

## 2024-06-14 PROCEDURE — 3078F DIAST BP <80 MM HG: CPT | Mod: CPTII,,,

## 2024-06-14 PROCEDURE — 99214 OFFICE O/P EST MOD 30 MIN: CPT | Mod: S$PBB,,,

## 2024-06-14 PROCEDURE — 99999 PR PBB SHADOW E&M-EST. PATIENT-LVL V: CPT | Mod: PBBFAC,,,

## 2024-06-14 PROCEDURE — 99215 OFFICE O/P EST HI 40 MIN: CPT | Mod: PBBFAC

## 2024-06-14 PROCEDURE — 3074F SYST BP LT 130 MM HG: CPT | Mod: CPTII,,,

## 2024-06-14 PROCEDURE — 3008F BODY MASS INDEX DOCD: CPT | Mod: CPTII,,,

## 2024-06-14 PROCEDURE — 1160F RVW MEDS BY RX/DR IN RCRD: CPT | Mod: CPTII,,,

## 2024-06-14 NOTE — TELEPHONE ENCOUNTER
"Faxed pre op paperwork and Echo results to [facility] at [phone]    Outcome:    Your fax has been successfully sent to [phone] at [phone].    6/14/2024 1:42:21 PM Transmission Record   Sent to [phone] with remote ID "HEMANT Connector"   Result: (0/339;0/0) Success   Page record: 1 - 23   Elapsed time: 08:01 on channel 12    "

## 2024-06-14 NOTE — PROGRESS NOTES
Josey Flores  06/14/2024  [id number]    Kishore Persaud MD  Patient Care Team:  Kishore Persaud MD as PCP - General (Family Medicine)  Kalpana Ca LCSW as  (Hematology and Oncology)  Rosalina Miner LMSW as  (Hematology and Oncology)  Kyara Jimenez, PharmD as Pharmacist (Pharmacist)  Marilin Hart NP as Nurse Practitioner (Palliative Medicine)  Apoorva Sheehan LMSW as  (Hematology and Oncology)          Visit Type:a scheduled routine follow-up visit    Chief Complaint   Patient presents with    Pre-op Exam        History of Present Illness:    Patient presents today for Pre-Op Clearance. She will undergo Open reduction internal fixation tibia . Dr. Prince [facility] will perform the procedure. The surgery is scheduled for 6/20/2024 and it will be under general anesthesia. The patient has had surgery before. He denies personal or family history of complications with anesthesia.   History:  Past Medical History:   Diagnosis Date    Anxiety     Asthma     Breast cancer 2017    Cancer     right breast, metastatic-lymph nodes, bone, and thyroid     COPD (chronic obstructive pulmonary disease)     Depression     History of psychiatric hospitalization     2000; suicidal ideation    Hx of psychiatric care     Hypothyroidism     Miscarriage     five miscarriages    Obesity     Psychiatric problem     Thyroid cancer 2017     Past Surgical History:   Procedure Laterality Date    ABSCESS DRAINAGE      bilateral axilla    ADENOIDECTOMY      APPENDECTOMY      BREAST BIOPSY Right     x3    CHOLECYSTECTOMY      ENDOBRONCHIAL ULTRASOUND Bilateral 02/18/2021    Procedure: ENDOBRONCHIAL ULTRASOUND (EBUS);  Surgeon: Jaron Ni MD;  Location: [facility] ENDO;  Service: Pulmonary;  Laterality: Bilateral;    FLUOROSCOPY N/A 01/28/2021    Procedure: Mediport placement;  Surgeon: Noah Phelan MD;  Location: [facility] CATH LAB;  Service: General;  Laterality: N/A;    MASS EXCISION       MEDIPORT INSERTION, SINGLE Right 02/2021    SKIN GRAFT  06/16/2017    THYROIDECTOMY Bilateral     TONSILLECTOMY       Family History   Problem Relation Name Age of Onset    Arthritis Mother Beatrice     Early death Mother Beatrice         64 at time of death    Heart attack Mother Beatrice     Heart disease Mother Beatrice     Heart failure Mother Beatrice     Hypertension Mother Beatrice     Coronary artery disease Father David     Hearing loss Father David     Heart disease Father David     Hyperlipidemia Father David     Hypertension Father David     Mental illness Father David     Learning disabilities Son Keyur     Cancer Maternal Aunt       Social History     Socioeconomic History    Marital status:     Number of children: 2   Tobacco Use    Smoking status: Every Day     Current packs/day: 1.00     Average packs/day: 1 pack/day for 39.5 years (39.5 ttl pk-yrs)     Types: Cigarettes     Start date: 1/1/1985     Passive exposure: Current    Smokeless tobacco: Never    Tobacco comments:     45 pack year history   Substance and Sexual Activity    Alcohol use: No    Drug use: No    Sexual activity: Not Currently     Partners: Male     Birth control/protection: None   Social History Narrative     with two adult children; common law marriage (10+years); raised Yarsanism, no current Nondenominational practice     Social Determinants of Health     Financial Resource Strain: Medium Risk (5/27/2024)    Received from [facility] Sub[facility]    Overall Financial Resource Strain (CARDIA)     Difficulty of Paying Living Expenses: Somewhat hard   Food Insecurity: No Food Insecurity (5/27/2024)    Received from [facility] Sub[facility] and [facility]    Hunger Vital Sign     Worried About Running Out of Food in the Last Year: Never true     Ran Out of Food in the Last Year: Never true   Transportation  Needs: No Transportation Needs (5/27/2024)    [facility]    PRAPARE - Transportation     Lack of Transportation (Medical): No     Lack of Transportation (Non-Medical): No   Physical Activity: Inactive (5/27/2024)    [facility]    Exercise Vital Sign     Days of Exercise per Week: 0 days     Minutes of Exercise per Session: 0 min   Stress: Stress Concern Present (5/27/2024)    [facility]    [employer] [place] [employer] - Occupational Stress Questionnaire     Feeling of Stress : Very much   Housing Stability: Unknown (11/27/2023)    Housing Stability Vital Sign     Unable to Pay for Housing in the Last Year: No     Unstable Housing in the Last Year: No     Patient Active Problem List   Diagnosis    Post-menopausal bleeding    Malignant neoplasm of overlapping sites of right breast in female, estrogen receptor positive    Anxiety    Malignant neoplasm metastatic to lymph node of axilla    Secondary cancer of bone    COPD with asthma    Tobacco abuse disorder    Papillary thyroid carcinoma    Neoplasm related pain    Edema extremities    Psychophysiological insomnia    Branch retinal vein occlusion of left eye with macular edema    Loss of vision, left eye    HSIL (high grade squamous intraepithelial lesion) on Pap smear of cervix    Palliative care encounter    Urinary tract infection with hematuria    Malignant neoplasm of endocervix    Abnormal PET scan, lung    Chemotherapy-induced nausea    Hydradenitis    Elevated troponin    Hypokalemia    Hypothyroidism    Primary breast cancer with metastasis to other site    Lower abdominal pain    Acute suppurative otitis media of right ear without spontaneous rupture of tympanic membrane    Otalgia of  left ear    Recurrent acute suppurative otitis media    Pruritus    Primary osteoarthritis of both knees    Chronic pain of both knees    Pancytopenia    Class 1 obesity due to excess calories with serious comorbidity and body mass index (BMI) of 31.0 to 31.9 in adult    Impaired functional mobility, balance, gait, and endurance    Embolic infarction    Left-sided weakness    Hypotension    Intractable pain    Diarrhea    Anxiety and depression     Review of patient's allergies indicates:   Allergen Reactions    Gabapentin Swelling     Note: unilateral joint edema, unclear if due to gabapentin    Nsaids (non-steroidal anti-inflammatory drug) Other (See Comments)     Instructed not to take NSAID drugs with chemo pill.    Zometa [zoledronic acid] Other (See Comments)     Possible osteonecrosis jaw    Clindamycin Rash    Vancomycin analogues Itching       The following were reviewed at this visit: active problem list, medication list, allergies, family history, social history, and health maintenance.    Medications:  Current Outpatient Medications   Medication Sig Dispense Refill    albuterol (PROVENTIL/VENTOLIN HFA) 90 mcg/actuation inhaler Inhale 2 puffs into the lungs every 4 (four) hours as needed for Wheezing or Shortness of Breath. 18 g 11    ALPRAZolam (XANAX) 0.5 MG tablet Take 1 tablet (0.5 mg total) by mouth 2 (two) times daily as needed for Anxiety. 60 tablet 0    atorvastatin (LIPITOR) 40 MG tablet Take 1 tablet (40 mg total) by mouth once daily. 90 tablet 3    calcium carbonate 400 mg calcium (1,000 mg) Chew Take 2,000 mg by mouth once daily.      clopidogreL (PLAVIX) 75 mg tablet Take 1 tablet (75 mg total) by mouth once daily. 30 tablet 11    DULoxetine (CYMBALTA) 60 MG capsule Take 60 mg by mouth.      ergocalciferol (ERGOCALCIFEROL) 50,000 unit Cap Take 1 capsule (50,000 Units total) by mouth every 7 days. 12 capsule 2    ergocalciferol (VITAMIN D2) 50,000 unit Cap Take 5,000 Units by mouth once daily  at 6am.       folic acid (FOLVITE) 1 MG tablet Take 1 tablet (1 mg total) by mouth once daily. 100 tablet 3    HYDROcodone-acetaminophen (NORCO)  mg per tablet Take 1 tablet by mouth every 8 (eight) hours as needed for Pain (3 doses/day). 42 tablet 0    hydrOXYzine HCL (ATARAX) 25 MG tablet Take 1 tablet (25 mg total) by mouth 3 (three) times daily as needed for Itching. 90 tablet 1    ipratropium (ATROVENT) 42 mcg (0.06 %) nasal spray 2 sprays by Nasal route 4 (four) times daily. As needed for rhinitis. Take in evening before bed and in the morning 15 mL 11    letrozole (FEMARA) 2.5 mg Tab Take 1 tablet (2.5 mg total) by mouth once daily. 30 tablet 2    levothyroxine (SYNTHROID) 300 MCG Tab Take 1 tablet (300 mcg total) by mouth before breakfast. 30 tablet 11    multivitamin (THERAGRAN) per tablet Take 1 tablet by mouth once daily.      naloxone (NARCAN) 4 mg/actuation Spry SMARTSIG:Both Nares      palbociclib (IBRANCE) 125 mg Cap Take one capsule (125 mg) by mouth once daily on days 1-21 of each 28-day cycle. 21 capsule 11    pentoxifylline (TRENTAL) 400 mg TbSR Take 1 tablet (400 mg total) by mouth 2 (two) times daily with meals. 60 tablet 11    potassium chloride SA (K-DUR,KLOR-CON) 20 MEQ tablet Take 1 tablet (20 mEq total) by mouth once daily. Take daily for 3 days. 3 tablet 1    vitamin E 1000 UNIT capsule Take 1 capsule (1,000 Units total) by mouth once daily. 30 capsule 11     No current facility-administered medications for this visit.          Medications have been reviewed and reconciled with patient at this visit.  Barriers to medications reviewed with patient.    Adverse reactions to current medications reviewed with patient..    Over the counter medications reviewed and reconciled with patient.    Exam:  Wt Readings from Last 3 Encounters:   05/09/24 107 kg (235 lb 14.3 oz)   04/25/24 106.2 kg (234 lb 2.1 oz)   03/26/24 103.9 kg (229 lb)     Temp Readings from Last 3 Encounters:   06/11/24 97.8  °F (36.6 °C) (Temporal)   05/09/24 98.8 °F (37.1 °C)   04/25/24 97.6 °F (36.4 °C)     BP Readings from Last 3 Encounters:   06/11/24 109/74   05/09/24 109/68   04/25/24 121/81     Pulse Readings from Last 3 Encounters:   06/11/24 88   05/09/24 99   04/25/24 95     There is no height or weight on file to calculate BMI.      Review of Systems   Respiratory:  Negative for shortness of breath.    Cardiovascular:  Negative for chest pain and palpitations.   Musculoskeletal:  Positive for joint pain.   Neurological:  Positive for weakness.     Physical Exam  Nursing note reviewed.   Constitutional:       Appearance: She is obese.   HENT:      Head: Normocephalic and atraumatic.      Mouth/Throat:      Mouth: Mucous membranes are dry.   Cardiovascular:      Rate and Rhythm: Normal rate and regular rhythm.      Heart sounds: Normal heart sounds.   Pulmonary:      Effort: Pulmonary effort is normal. No respiratory distress.      Breath sounds: Normal breath sounds.   Musculoskeletal:         General: Tenderness present.      Comments: The patient is using an assistive device during the visit . Walking leg is in walking boot    Skin:     Findings: Bruising present.   Neurological:      Mental Status: She is alert and oriented to person, place, and time.   Psychiatric:         Mood and Affect: Mood normal.         Behavior: Behavior normal.         Thought Content: Thought content normal.         Judgment: Judgment normal.         Laboratory Reviewed ({Yes)  Lab Results   Component Value Date    WBC 7.82 06/11/2024    HGB 12.8 06/11/2024    HCT 38.0 06/11/2024     06/11/2024    CHOL 256 (H) 09/29/2023    TRIG 216 (H) 09/29/2023    HDL 42 09/29/2023    ALT 17 06/11/2024    AST 20 06/11/2024     06/11/2024    K 3.6 06/11/2024     06/11/2024    CREATININE 1.4 06/11/2024    BUN 12 06/11/2024    CO2 23 06/11/2024    TSH 22.019 (H) 06/11/2024    INR 1.1 06/03/2023    HGBA1C 5.5 09/29/2023     Josey was seen today  for pre-op exam.    Diagnoses and all orders for this visit:    Preoperative clearance    Closed displaced fracture of medial malleolus of right tibia with routine healing, subsequent encounter  -     WHEELCHAIR FOR HOME USE    Colon cancer screening  -     Cologuard Screening (Multitarget Stool DNA); Future  -     Cologuard Screening (Multitarget Stool DNA)    Stage 3b chronic kidney disease  Will continue to monitor and avoid/limit renal toxic agents      Gait abnormality  Patient is using an assistive device at visit. Disused fall prevention      Primary malignant neoplasm of breast with metastasis to other site, unspecified laterality    Recurrent cervical cancer    Cerebrovascular accident (CVA), unspecified mechanism  Currently on Plavix and Lipitor       Patient is to hold Plavix 5 and Ibrance hold 5 days before surgery. Would recommend repeating BMP after surgery and maintaining adequate hydration.     Patient would like a walker for after surgery. Ordered placed       The patient is cleared for upcoming surgery.  If you have any questions or concerns, or if I can be of further assistance, please do not hesitate to contact me.          ESTEBAN Coto-PONCE      Care Plan/Goals: Reviewed    Goals    None         Follow up: No follow-ups on file.    After visit summary was printed and given to patient upon discharge today.  Patient goals and care plan are included in After Visit Summary.

## 2024-06-14 NOTE — TELEPHONE ENCOUNTER
9:35 am - Swer received missed call and vm from pt. Pt returning swer call.    9:43 am - Swer returned pts call ([phone]). Pt reports she will have surgery on next Thursday where she will have a pin placed in her ankle. She reports this will debilitate her for some time and will required PT services. She reports having just met with her PCP and her PCP will assist in ordering a wheelchair. Aside form this, pt had questions regarding Medicare and where to go to apply. Swer discussed pt can apply by calling Medicare telephone number or scheduling with  office. Pt reports she is in her car and is unable to take down the information. Pt reports she checks her [employer] messaging and can receive information this way. Swer to message pt with details for applying. Otherwise, pt reports she is doing ok and had no other needs for swer. Pt to call if any other needs arise. Swer will remain available.

## 2024-06-19 NOTE — ASSESSMENT & PLAN NOTE
-Code status: DNR w/Selective treatment. LaPOST 01/6/2022.   -HCPOA: Sister: David Shuff: [phone]   -GOC:  Continue cancer treatment, symptom management, maintain independence and functional status.   -See past visit notes for further details

## 2024-06-19 NOTE — ASSESSMENT & PLAN NOTE
Neoplasm related pain  Uncontrolled- Due to right foot fracture.   -Current Regimen: OTC Acetaminophen PRN   -Increased to Norco 10mg TID PRN x2 weeks due to right foot fracture.   -Pt followed by Dr. Prince at  [facility].   -Pt to undergo right ankle fixation on June 13, 2024.   -Plans to wean Norco back to 5mg TID PRN once right foot fracture pain improves.   -Patient instructed to contact me if 3 day supply of meds left.   -Narcan RX given and explained use to pt and family. Pt and family verbalized understanding.    -Pain Contract-4/18/2023  -UDS- Collected 5/9/2024. Pending results.

## 2024-06-20 DIAGNOSIS — R45.89 ANXIETY ABOUT HEALTH: ICD-10-CM

## 2024-06-20 DIAGNOSIS — C79.81 METASTATIC MALIGNANT NEOPLASM TO BREAST: Primary | ICD-10-CM

## 2024-06-20 RX ORDER — ALPRAZOLAM 0.5 MG/1
0.5 TABLET ORAL 2 TIMES DAILY PRN
Qty: 60 TABLET | Refills: 0 | Status: SHIPPED | OUTPATIENT
Start: 2024-06-20 | End: 2024-07-20

## 2024-06-25 ENCOUNTER — TELEPHONE (OUTPATIENT)
Dept: PALLIATIVE MEDICINE | Facility: CLINIC | Age: 54
End: 2024-06-25
Payer: MEDICAID

## 2024-06-25 ENCOUNTER — OFFICE VISIT (OUTPATIENT)
Dept: PALLIATIVE MEDICINE | Facility: CLINIC | Age: 54
End: 2024-06-25
Payer: MEDICAID

## 2024-06-25 VITALS
OXYGEN SATURATION: 98 % | TEMPERATURE: 97 F | HEIGHT: 67 IN | SYSTOLIC BLOOD PRESSURE: 105 MMHG | DIASTOLIC BLOOD PRESSURE: 72 MMHG | RESPIRATION RATE: 18 BRPM | BODY MASS INDEX: 36.1 KG/M2 | WEIGHT: 230 LBS | HEART RATE: 106 BPM

## 2024-06-25 DIAGNOSIS — Z17.0 MALIGNANT NEOPLASM OF OVERLAPPING SITES OF RIGHT BREAST IN FEMALE, ESTROGEN RECEPTOR POSITIVE: Primary | ICD-10-CM

## 2024-06-25 DIAGNOSIS — Z51.5 PALLIATIVE CARE ENCOUNTER: ICD-10-CM

## 2024-06-25 DIAGNOSIS — F32.A ANXIETY AND DEPRESSION: ICD-10-CM

## 2024-06-25 DIAGNOSIS — S92.901A UNSPECIFIED FRACTURE OF RIGHT FOOT, INITIAL ENCOUNTER FOR CLOSED FRACTURE: ICD-10-CM

## 2024-06-25 DIAGNOSIS — G89.3 NEOPLASM RELATED PAIN: ICD-10-CM

## 2024-06-25 DIAGNOSIS — C50.811 MALIGNANT NEOPLASM OF OVERLAPPING SITES OF RIGHT BREAST IN FEMALE, ESTROGEN RECEPTOR POSITIVE: Primary | ICD-10-CM

## 2024-06-25 DIAGNOSIS — F41.9 ANXIETY AND DEPRESSION: ICD-10-CM

## 2024-06-25 PROCEDURE — 99999 PR PBB SHADOW E&M-EST. PATIENT-LVL V: CPT | Mod: PBBFAC,,,

## 2024-06-25 PROCEDURE — 1159F MED LIST DOCD IN RCRD: CPT | Mod: CPTII,,,

## 2024-06-25 PROCEDURE — 3008F BODY MASS INDEX DOCD: CPT | Mod: CPTII,,,

## 2024-06-25 PROCEDURE — 3074F SYST BP LT 130 MM HG: CPT | Mod: CPTII,,,

## 2024-06-25 PROCEDURE — 99214 OFFICE O/P EST MOD 30 MIN: CPT | Mod: S$PBB,,,

## 2024-06-25 PROCEDURE — 99215 OFFICE O/P EST HI 40 MIN: CPT | Mod: PBBFAC

## 2024-06-25 PROCEDURE — 3078F DIAST BP <80 MM HG: CPT | Mod: CPTII,,,

## 2024-06-25 RX ORDER — HYDROCODONE BITARTRATE AND ACETAMINOPHEN 10; 325 MG/1; MG/1
1 TABLET ORAL EVERY 6 HOURS PRN
Qty: 56 TABLET | Refills: 0 | Status: SHIPPED | OUTPATIENT
Start: 2024-06-25 | End: 2024-07-09

## 2024-06-25 NOTE — TELEPHONE ENCOUNTER
----- Message from Margaret Castillo sent at 6/25/2024  2:34 PM CDT -----  Contact: Mobile  [phone]  Patient said that you have ordered HYDROcodone-acetaminophen (NORCO)  mg per tablet for her but she said that her insurance company does not cover this script, patient would like for you to order another script for her and send it to..        [facility] [address] [place] [id number]  Phone: [phone] Fax: [phone]

## 2024-06-25 NOTE — PROGRESS NOTES
Palliative Medicine Clinic Note  Follow-up        Consult Requested By: No ref. provider found      Reason for Consult: Symptom  Management    Chief Complaint: Right ankle/foot fracture/pain and decreased mobility.           ASSESSMENT/PLAN:      Plan/Recommendations:    Problem List Items Addressed This Visit          Psychiatric    Anxiety and depression     Anxiety/Depression  Related to Cancer diagnosis and financial stress.   Xanax 0.5mg BID PRN   Continue Duloxetine 60mg daily.   Pt declined mental health counseling referral at this time  SW consult to assist with financial resources.                Oncology    Malignant neoplasm of overlapping sites of right breast in female, estrogen receptor positive - Primary     -Followed by Dr. Ruff. Previously Dr. Joseph, Dr. Asencio  -On Surveillance- Continues Letrozole and Ibrance  -CT A/P: 05/02/2024: Localized mildly dilated proximal jejunal bowel loops with more normal-caliber   mid to distal small bowel and colon. Findings raise suspicion for proximal   partial small-bowel obstruction versus localized ileus/gastroenteritis.   No evidence of metastatic disease within the abdomen or pelvis.   Stable low-density fatty liver changes.   Stable mixed lytic and sclerotic lesions within the pelvis and T9 marrow space   likely representing treated metastatic disease. No new osseous lesions   identified.   -PET scan 05/03/2023: There is a focal mildly hypermetabolic ground-glass opacity in the left lower lobe which is new from the prior examination. This is more likely infectious/inflammatory in nature. Short-term CT chest follow-up is advised.         Relevant Medications    HYDROcodone-acetaminophen (NORCO)  mg per tablet    Neoplasm related pain     Neoplasm related pain  Uncontrolled- Due to right ankle/foot fracture.   -Norco 10mg Q6H PRN x2 weeks due to right ankle/foot fracture.   -Pt followed by Dr. Prince at  [facility].   -Post right ankle fixation  on June 13, 2024.   -Plans to wean Norco back to 5mg TID PRN once right ankle/foot fracture pain improves.   -Patient instructed to contact me if 3 day supply of meds left.   -Narcan RX given and explained use to pt and family. Pt and family verbalized understanding.    -Pain Contract-4/18/2023  -UDS- next visit. Previous collection not resulted.          Relevant Medications    HYDROcodone-acetaminophen (NORCO)  mg per tablet       Palliative Care    Palliative care encounter     -Code status: DNR w/Selective treatment. LaPOST 01/6/2022.   -HCPOA: Sister: David Shuff: [phone]   -GOC:  Continue cancer treatment, symptom management, maintain independence and functional status.   -See past visit notes for further details           Other Visit Diagnoses       Unspecified fracture of right foot, initial encounter for closed fracture        Relevant Medications    HYDROcodone-acetaminophen (NORCO)  mg per tablet                        Problem List Items Addressed This Visit          Oncology    Malignant neoplasm of overlapping sites of right breast in female, estrogen receptor positive - Primary    Relevant Medications    HYDROcodone-acetaminophen (NORCO)  mg per tablet    Neoplasm related pain    Relevant Medications    HYDROcodone-acetaminophen (NORCO)  mg per tablet     Other Visit Diagnoses       Unspecified fracture of right foot, initial encounter for closed fracture        Relevant Medications    HYDROcodone-acetaminophen (NORCO)  mg per tablet                  Advance Care Planning   Advance Directives:   Living Will: Yes        Copy on chart: Yes    [place]POST: Yes    Do Not Resuscitate Status: Yes    Medical Power of : Yes    Agent's Name:  Sister: David Acevedo   Agent's Contact Number:  [phone]    Decision Making:  Patient answered questions  Goals of Care: The patient endorses that what is most important right now is to focus on remaining as independent as  possible, symptom/pain control, and quality of life, even if it means sacrificing a little time    Accordingly, we have decided that the best plan to meet the patient's goals includes continuing with treatment.        Follow up: 1 month re: pain management      Plan discussed with Patient.    SUBJECTIVE:      History of Present Illness / Interval History:  Josey Flores is a 52 y.o. female with history of metastatic breast cancer, thyroid cancer post thyroidectomy (2017), and cervical cancer (2017). She is on surveillance on Letrozole and Ibrance.  S/p stroke with left-sided weakness (June 02, 2023).   Right foot fracture 05/16/2024- Post fixation June 13, 2024- Dr. Prince ([facility]).   She is followed by Dr. Ruff.  Presents to Palliative Care Clinic for pain and anxiety.   Please see previous palliative care notes for more details on pt's care so far.       6/25/24: History obtained from: Patient  Pt attended clinic with her , Mr. Mccallum. Seen sitting in wheelchair. She appeared uncomfortable.   She reported worsening right leg/foot pain following fixation on June 13, 2024 by Dr. Prince at [facility]. Follow-up on July 2nd.   She is taking Norco 10mg Q8H PRN with little relief.   Persistent abdominal cramping pending GI visit with Dr. Beckman on July 16, 2024.  She her mood is low due to decreased mobility post right ankle fracture and persistent pain.   She requested assistance with obtaining a wheelchair through [facility]. DAYANNA lynch.        Past visits:     06/11/2024: History obtained from: Patient  Pt seen via audiovisual feed. A&O x3. Seen sitting in a car. No acute distress.   She reported persistent right ankle pain due to right foot fracture on May 16, 2024  She has been taking Norco 10mg Q6-8H PRN due to worsening pain.   She reported she is scheduled to undergo fixation of the right ankle on June 13th, 2024 by Dr. Prince at [facility].   Her mood is  stable  on Duloxetine and Xanax PRN.     05/09/2024: History obtained from: Patient  and , Mr. Mccallum.   Pt attended clinic with her , Mr. Mccallum. She appeared uncomfortable.   Vital signs stable on room air.   She reported persistent lower abd/pelvic pain and bilateral ribs pain. She has been takin OTC Acetaminophen with no significant effect.   She saw Dr. Zurita (Chronic Pain). He increased Duloxetine and Lyrica, but Norco was discontinued.   She was previously on Norco 10mg QID PRN. Last 30-day refill was Dec. 2023.   She attempted to wean to Norco 5mg TID, but has been unsuccessful.   She has been anxious and depressed due to financial stress. Last Xanax refill Jan. 2024.   No follow-up with her PCP or chronic pain since January 2024.       1/11/2024:   Pt attended clinic with her partner, Mr. Mccallum. She appeared uncomfortable. Vital signs stable on room air.   She has established care with chronic pain provider, Dr. Zurita  She reported she is now taking Lyrica and Duloxetine 60mg daily.   However, she has noted worsening abd/pelvic pain.   She is also attempting to self-wean her Norco 10mg Q6H PRN. She is now taking Norco 5mg Q6H PRN. Self weaning started 4-5 days ago   She is concerned about running out of narcotics and Dr. Zurita not willing to prescribe narcotics. I have explained to her I can not prescribe narcotics as her previous scans have shown no evidence of recurrence/metastasis.   Next CT on Saturday, Jan 13, 2024  Anxiety/Depression due to her dog's recent death.    Unsuccessful with smoking cessation due to stress    ROS:  Review of Systems   Constitutional:  Positive for fatigue. Negative for activity change, appetite change and unexpected weight change.   HENT:  Negative for hearing loss, sore throat, trouble swallowing and voice change.    Eyes:  Negative for visual disturbance.   Respiratory:  Negative for cough, chest tightness, shortness of breath and wheezing.     Cardiovascular:  Negative for chest pain, palpitations and leg swelling.   Gastrointestinal:  Negative for abdominal pain, blood in stool, nausea, rectal pain and vomiting.   Genitourinary:  Negative for bladder incontinence, difficulty urinating, flank pain, hematuria, nocturia, pelvic pain and urgency.   Musculoskeletal:  Positive for arthralgias, leg pain (Right ankle/foot) and myalgias. Negative for neck pain.   Integumentary:  Negative for wound.   Allergic/Immunologic: Negative for environmental allergies, food allergies and immunocompromised state.   Neurological:  Positive for weakness (Right leg post fx/fixation). Negative for dizziness, tremors, numbness, headaches, memory loss and coordination difficulties.   Hematological:  Negative for adenopathy. Does not bruise/bleed easily.   Psychiatric/Behavioral:  Positive for dysphoric mood (Due to decreased mobility following R ankle fx) and sleep disturbance. Negative for agitation, behavioral problems, confusion, decreased concentration, self-injury and suicidal ideas. The patient is nervous/anxious (exacerbated by recent r ankle fx).        Review of Symptoms      Symptom Assessment (ESAS 0-10 Scale)  Pain:  8  Dyspnea:  0  Anxiety:  7  Nausea:  0  Depression:  5  Anorexia:  5  Fatigue:  8  Insomnia:  8  Restlessness:  8  Agitation:  8     CAM / Delirium:  Negative  Constipation:  Negative  Diarrhea:  Negative    Anxiety:  Is nervous/anxious (exacerbated by recent r ankle fx)    Bowel Management Plan (BMP):  No      Pain Assessment:    Location(s): abdomen and ankle    Abdomen       Location: anterior        Quality: Cramping        Quantity: 6/10 in intensity        Chronicity: Onset 1 year(s) ago, unchanged        Aggravating Factors: Movement        Alleviating Factors: Acetaminophen        Associated Symptoms: Myalgias  Ankle       Location: right       Quality: Aching and pressure-like        Quantity: 8/10 in intensity        Chronicity: Onset 1  month(s) ago, gradually worsening since Right ankle fracture on May 16th, 2024        Aggravating Factors: Pressure and movement        Alleviating Factors: Opiates        Associated Symptoms: Myalgias and arthralgias    Modified Osmar Scale:  0    Performance Status:  90    ECOG Performance Status ndGndrndanddndend:nd nd2nd Living Arrangements:  Lives with spouse    Psychosocial/Cultural:   See Palliative Psychosocial Note: Yes  Lives with her significant other of 16 years: they have 3 children combined, 2 sons from previous marriage.    **Primary  to Follow**  Palliative Care  Consult: Yes            Medications:    Current Outpatient Medications:     albuterol (PROVENTIL/VENTOLIN HFA) 90 mcg/actuation inhaler, Inhale 2 puffs into the lungs every 4 (four) hours as needed for Wheezing or Shortness of Breath., Disp: 18 g, Rfl: 11    ALPRAZolam (XANAX) 0.5 MG tablet, Take 1 tablet (0.5 mg total) by mouth 2 (two) times daily as needed for Anxiety., Disp: 60 tablet, Rfl: 0    atorvastatin (LIPITOR) 40 MG tablet, Take 1 tablet (40 mg total) by mouth once daily., Disp: 90 tablet, Rfl: 3    calcium carbonate 400 mg calcium (1,000 mg) Chew, Take 2,000 mg by mouth once daily., Disp: , Rfl:     DULoxetine (CYMBALTA) 60 MG capsule, Take 60 mg by mouth., Disp: , Rfl:     ergocalciferol (ERGOCALCIFEROL) 50,000 unit Cap, Take 1 capsule (50,000 Units total) by mouth every 7 days., Disp: 12 capsule, Rfl: 2    ergocalciferol (VITAMIN D2) 50,000 unit Cap, Take 5,000 Units by mouth once daily at 6am. , Disp: , Rfl:     folic acid (FOLVITE) 1 MG tablet, Take 1 tablet (1 mg total) by mouth once daily., Disp: 100 tablet, Rfl: 3    hydrOXYzine HCL (ATARAX) 25 MG tablet, Take 1 tablet (25 mg total) by mouth 3 (three) times daily as needed for Itching., Disp: 90 tablet, Rfl: 1    ipratropium (ATROVENT) 42 mcg (0.06 %) nasal spray, 2 sprays by Nasal route 4 (four) times daily. As needed for rhinitis. Take in evening before bed  and in the morning, Disp: 15 mL, Rfl: 11    letrozole (FEMARA) 2.5 mg Tab, Take 1 tablet (2.5 mg total) by mouth once daily., Disp: 30 tablet, Rfl: 2    levothyroxine (SYNTHROID) 300 MCG Tab, Take 1 tablet (300 mcg total) by mouth before breakfast., Disp: 30 tablet, Rfl: 11    multivitamin (THERAGRAN) per tablet, Take 1 tablet by mouth once daily., Disp: , Rfl:     naloxone (NARCAN) 4 mg/actuation Spry, SMARTSIG:Both Nares, Disp: , Rfl:     palbociclib (IBRANCE) 125 mg Cap, Take one capsule (125 mg) by mouth once daily on days 1-21 of each 28-day cycle., Disp: 21 capsule, Rfl: 11    pentoxifylline (TRENTAL) 400 mg TbSR, Take 1 tablet (400 mg total) by mouth 2 (two) times daily with meals., Disp: 60 tablet, Rfl: 11    potassium chloride SA (K-DUR,KLOR-CON) 20 MEQ tablet, Take 1 tablet (20 mEq total) by mouth once daily. Take daily for 3 days., Disp: 3 tablet, Rfl: 1    vitamin E 1000 UNIT capsule, Take 1 capsule (1,000 Units total) by mouth once daily., Disp: 30 capsule, Rfl: 11    clopidogreL (PLAVIX) 75 mg tablet, Take 1 tablet (75 mg total) by mouth once daily., Disp: 30 tablet, Rfl: 11    HYDROcodone-acetaminophen (NORCO)  mg per tablet, Take 1 tablet by mouth every 6 (six) hours as needed for Pain (4 doses/day)., Disp: 56 tablet, Rfl: 0      External  database queried on 06/25/2024  by SUZIE REID :        Review of patient's allergies indicates:   Allergen Reactions    Gabapentin Swelling     Note: unilateral joint edema, unclear if due to gabapentin    Nsaids (non-steroidal anti-inflammatory drug) Other (See Comments)     Instructed not to take NSAID drugs with chemo pill.    Zometa [zoledronic acid] Other (See Comments)     Possible osteonecrosis jaw    Clindamycin Rash    Vancomycin analogues Itching           OBJECTIVE:         Physical Exam:  Vitals: Temp: 97.2 °F (36.2 °C) (06/25/24 1109)  Pulse: 106 (06/25/24 1109)  Resp: 18 (06/25/24 1109)  BP: 105/72 (06/25/24 1109)  SpO2: 98  % (06/25/24 1109)    Physical Exam  Vitals and nursing note reviewed.   Constitutional:       General: She is not in acute distress.     Appearance: Normal appearance. She is normal weight. She is ill-appearing.   HENT:      Head: Normocephalic and atraumatic.      Nose: Nose normal. No congestion or rhinorrhea.      Mouth/Throat:      Mouth: Mucous membranes are moist.      Pharynx: Oropharynx is clear. No oropharyngeal exudate or posterior oropharyngeal erythema.   Eyes:      General: No scleral icterus.        Right eye: No discharge.         Left eye: No discharge.      Conjunctiva/sclera: Conjunctivae normal.      Pupils: Pupils are equal, round, and reactive to light.   Cardiovascular:      Rate and Rhythm: Normal rate and regular rhythm.      Pulses: Normal pulses.      Heart sounds: Normal heart sounds. No murmur heard.     No gallop.   Pulmonary:      Effort: Pulmonary effort is normal. No respiratory distress.      Breath sounds: Normal breath sounds. No stridor. No wheezing.   Chest:      Chest wall: No tenderness.   Abdominal:      General: Bowel sounds are normal. There is no distension.      Palpations: Abdomen is soft.      Tenderness: There is no abdominal tenderness.   Genitourinary:     Comments: Deferred  Musculoskeletal:         General: Swelling and tenderness present.      Cervical back: Normal range of motion and neck supple.      Right lower leg: No edema.      Left lower leg: No edema.      Right ankle: Tenderness present. Decreased range of motion.      Comments: Right leg wrapped in bandage dressing. Only 3 toes visible. Color intact. Pt able to move toes.    Skin:     General: Skin is warm and dry.      Capillary Refill: Capillary refill takes less than 2 seconds.      Coloration: Skin is not jaundiced or pale.      Findings: No rash.   Neurological:      Mental Status: She is alert and oriented to person, place, and time.      Motor: No weakness.   Psychiatric:         Attention and  Perception: Attention and perception normal.         Mood and Affect: Mood normal.         Speech: Speech normal.         Behavior: Behavior normal. Behavior is cooperative.         Thought Content: Thought content normal.         Cognition and Memory: Cognition and memory normal.         Judgment: Judgment normal.           Labs: BMP  Lab Results   Component Value Date     06/11/2024    K 3.6 06/11/2024     06/11/2024    CO2 23 06/11/2024    BUN 12 06/11/2024    CREATININE 1.4 06/11/2024    CALCIUM 9.4 06/11/2024    ANIONGAP 11 06/11/2024    EGFRNORACEVR 44 (A) 06/11/2024     Lab Results   Component Value Date    WBC 7.82 06/11/2024    RBC 3.73 (L) 06/11/2024    HGB 12.8 06/11/2024    HCT 38.0 06/11/2024     (H) 06/11/2024    MCH 34.3 (H) 06/11/2024    MCHC 33.7 06/11/2024    RDW 13.6 06/11/2024     06/11/2024    MPV 12.2 06/11/2024    GRAN 5.0 06/11/2024    GRAN 64.1 06/11/2024    LYMPH 1.9 06/11/2024    LYMPH 23.9 06/11/2024    MONO 0.6 06/11/2024    MONO 7.5 06/11/2024    EOS 0.2 06/11/2024    BASO 0.05 06/11/2024    EOSINOPHIL 2.7 06/11/2024    BASOPHIL 0.6 06/11/2024          Imaging: CT A/P: 05/02/2024: Localized mildly dilated proximal jejunal bowel loops with more normal-caliber   mid to distal small bowel and colon. Findings raise suspicion for proximal   partial small-bowel obstruction versus localized ileus/gastroenteritis.   No evidence of metastatic disease within the abdomen or pelvis.   Stable low-density fatty liver changes.   Stable mixed lytic and sclerotic lesions within the pelvis and T9 marrow space   likely representing treated metastatic disease. No new osseous lesions   identified.      I spent a total of 35 minutes on the day of the visit. This includes face to face time in discussion of goals of care, symptom assessment, coordination of care and emotional support.  This also includes non-face to face time preparing to see the patient (eg, review of tests/imaging),  obtaining and/or reviewing separately obtained history, documenting clinical information in the electronic or other health record, independently interpreting results and communicating results to the patient/family/caregiver, or care coordinator.         SUZIE GOTTI NP

## 2024-06-25 NOTE — TELEPHONE ENCOUNTER
Contacted patient and informed her that after speaking with the pharmacy we will complete PA to possibly get insurance to cover rx. Patient verbalized understanding and had no other issues at this time.

## 2024-06-25 NOTE — PATIENT INSTRUCTIONS
You can take Norco every 4 to 6 hours up to 4 doses per day  Miralax 1pack/day or Senna 2 tabs at bedtime for opioid-induced constipation    Narcan has been prescribed if there is any concern of opioid overdose.   Signs of opioid overdose include:   Loss of consciousness  Unresponsive to outside stimulus  Awake, but unable to talk  Breathing is very slow and shallow, erratic, or has stopped  For lighter skinned people, the skin tone turns bluish purple, for darker skinned people, it turns grayish or ashen.  Choking sounds, or a snore-like gurgling noise (sometimes called the death rattle)  Vomiting  Body is very limp  Face is very pale or clammy  Fingernails and lips turn blue or purplish black  Pulse (heartbeat) is slow, erratic, or not there at all.   If you administer this medicine immediately go to the nearest emergency department for evaluation and stop taking all opioids.

## 2024-06-25 NOTE — ASSESSMENT & PLAN NOTE
Anxiety/Depression  Related to Cancer diagnosis and financial stress.   Xanax 0.5mg BID PRN   Continue Duloxetine 60mg daily.   Pt declined mental health counseling referral at this time  SW consult to assist with financial resources.

## 2024-06-25 NOTE — ASSESSMENT & PLAN NOTE
-Code status: DNR w/Selective treatment. LaPOST 01/6/2022.   -HCPOA: Sister: David Shuff: [phone]   -GOC:  Continue cancer treatment, symptom management, maintain independence and functional status.   -See past visit notes for further details

## 2024-06-25 NOTE — ASSESSMENT & PLAN NOTE
Neoplasm related pain  Uncontrolled- Due to right ankle/foot fracture.   -Norco 10mg Q6H PRN x2 weeks due to right ankle/foot fracture.   -Pt followed by Dr. Prince at  [facility].   -Post right ankle fixation on June 13, 2024.   -Plans to wean Norco back to 5mg TID PRN once right ankle/foot fracture pain improves.   -Patient instructed to contact me if 3 day supply of meds left.   -Narcan RX given and explained use to pt and family. Pt and family verbalized understanding.    -Pain Contract-4/18/2023  -UDS- next visit. Previous collection not resulted.

## 2024-06-26 ENCOUNTER — PATIENT MESSAGE (OUTPATIENT)
Dept: ADMINISTRATIVE | Facility: OTHER | Age: 54
End: 2024-06-26
Payer: MEDICAID

## 2024-07-01 ENCOUNTER — PATIENT MESSAGE (OUTPATIENT)
Dept: INTERNAL MEDICINE | Facility: CLINIC | Age: 54
End: 2024-07-01
Payer: MEDICAID

## 2024-07-16 ENCOUNTER — OFFICE VISIT (OUTPATIENT)
Dept: GASTROENTEROLOGY | Facility: CLINIC | Age: 54
End: 2024-07-16
Payer: MEDICAID

## 2024-07-16 ENCOUNTER — HOSPITAL ENCOUNTER (OUTPATIENT)
Dept: RADIOLOGY | Facility: HOSPITAL | Age: 54
Discharge: HOME | End: 2024-07-16
Attending: INTERNAL MEDICINE
Payer: MEDICAID

## 2024-07-16 VITALS
HEIGHT: 67 IN | SYSTOLIC BLOOD PRESSURE: 110 MMHG | WEIGHT: 229 LBS | HEART RATE: 106 BPM | BODY MASS INDEX: 35.94 KG/M2 | DIASTOLIC BLOOD PRESSURE: 80 MMHG

## 2024-07-16 DIAGNOSIS — R10.30 LOWER ABDOMINAL PAIN: Primary | ICD-10-CM

## 2024-07-16 DIAGNOSIS — R10.9 ABDOMINAL CRAMPING: ICD-10-CM

## 2024-07-16 DIAGNOSIS — R10.30 LOWER ABDOMINAL PAIN: ICD-10-CM

## 2024-07-16 PROCEDURE — 74018 RADEX ABDOMEN 1 VIEW: CPT | Mod: 26,,, | Performed by: RADIOLOGY

## 2024-07-16 PROCEDURE — 3079F DIAST BP 80-89 MM HG: CPT | Mod: CPTII,,, | Performed by: INTERNAL MEDICINE

## 2024-07-16 PROCEDURE — 3008F BODY MASS INDEX DOCD: CPT | Mod: CPTII,,, | Performed by: INTERNAL MEDICINE

## 2024-07-16 PROCEDURE — 1159F MED LIST DOCD IN RCRD: CPT | Mod: CPTII,,, | Performed by: INTERNAL MEDICINE

## 2024-07-16 PROCEDURE — 99215 OFFICE O/P EST HI 40 MIN: CPT | Mod: PBBFAC,PN,25 | Performed by: INTERNAL MEDICINE

## 2024-07-16 PROCEDURE — 99204 OFFICE O/P NEW MOD 45 MIN: CPT | Mod: S$PBB,,, | Performed by: INTERNAL MEDICINE

## 2024-07-16 PROCEDURE — 74018 RADEX ABDOMEN 1 VIEW: CPT | Mod: TC

## 2024-07-16 PROCEDURE — 3074F SYST BP LT 130 MM HG: CPT | Mod: CPTII,,, | Performed by: INTERNAL MEDICINE

## 2024-07-16 PROCEDURE — 99999 PR PBB SHADOW E&M-EST. PATIENT-LVL V: CPT | Mod: PBBFAC,,, | Performed by: INTERNAL MEDICINE

## 2024-07-16 RX ORDER — SODIUM, POTASSIUM,MAG SULFATES 17.5-3.13G
1 SOLUTION, RECONSTITUTED, ORAL ORAL DAILY
Qty: 1 KIT | Refills: 0 | Status: SHIPPED | OUTPATIENT
Start: 2024-07-16 | End: 2024-07-18

## 2024-07-16 NOTE — PROGRESS NOTES
[facility]  Gastroenterology    Patient evaluated at the request of Donita Ruff MD  [address] [place] [address]    PCP: Kishore Persaud MD    7/16/24    Reason for Visit: Lower Abdominal Pain    Subjective:   Josey Flores is a 53 y.o. female here for lower abdominal pain. States she has had multiple cancers and most recently had vaginal radiation inserts placed which have since been removed. The pain seemed to start after the inserts. The pain is only when having a BM or after the BM. Sometimes after eating she will get some pressure in lower abdomen. Does take low dose narcotics- has a recent fall with foot fracture but has been on pain meds throughout her cancer history as well. Stool output is small and diarrhea-like. Patient had a CT scan in 05/2024 that showed possible proximal partial small bowel obstruction vs localized ileus. She has never had a colonoscopy.       Past Medical History:   Diagnosis Date    Anxiety     Asthma     Breast cancer 2017    Cancer     right breast, metastatic-lymph nodes, bone, and thyroid     COPD (chronic obstructive pulmonary disease)     Depression     History of psychiatric hospitalization     2000; suicidal ideation    Hx of psychiatric care     Hypothyroidism     Miscarriage     five miscarriages    Obesity     Psychiatric problem     Thyroid cancer 2017       Past Surgical History:   Procedure Laterality Date    ABSCESS DRAINAGE      bilateral axilla    ADENOIDECTOMY      APPENDECTOMY      BREAST BIOPSY Right     x3    CHOLECYSTECTOMY      ENDOBRONCHIAL ULTRASOUND Bilateral 02/18/2021    Procedure: ENDOBRONCHIAL ULTRASOUND (EBUS);  Surgeon: Jaron Ni MD;  Location: [facility] ENDO;  Service: Pulmonary;  Laterality: Bilateral;    FLUOROSCOPY N/A 01/28/2021    Procedure: Mediport placement;  Surgeon: Noah Phelan MD;  Location: [facility] CATH LAB;  Service: General;  Laterality: N/A;    MASS EXCISION      MEDIPORT INSERTION,  SINGLE Right 02/2021    SKIN GRAFT  06/16/2017    THYROIDECTOMY Bilateral     TONSILLECTOMY         Current Outpatient Medications on File Prior to Visit   Medication Sig Dispense Refill    albuterol (PROVENTIL/VENTOLIN HFA) 90 mcg/actuation inhaler Inhale 2 puffs into the lungs every 4 (four) hours as needed for Wheezing or Shortness of Breath. 18 g 11    ALPRAZolam (XANAX) 0.5 MG tablet Take 1 tablet (0.5 mg total) by mouth 2 (two) times daily as needed for Anxiety. 60 tablet 0    atorvastatin (LIPITOR) 40 MG tablet Take 1 tablet (40 mg total) by mouth once daily. 90 tablet 3    calcium carbonate 400 mg calcium (1,000 mg) Chew Take 2,000 mg by mouth once daily.      DULoxetine (CYMBALTA) 60 MG capsule Take 60 mg by mouth.      ergocalciferol (ERGOCALCIFEROL) 50,000 unit Cap Take 1 capsule (50,000 Units total) by mouth every 7 days. 12 capsule 2    ergocalciferol (VITAMIN D2) 50,000 unit Cap Take 5,000 Units by mouth once daily at 6am.       folic acid (FOLVITE) 1 MG tablet Take 1 tablet (1 mg total) by mouth once daily. 100 tablet 3    hydrOXYzine HCL (ATARAX) 25 MG tablet Take 1 tablet (25 mg total) by mouth 3 (three) times daily as needed for Itching. 90 tablet 1    ipratropium (ATROVENT) 42 mcg (0.06 %) nasal spray 2 sprays by Nasal route 4 (four) times daily. As needed for rhinitis. Take in evening before bed and in the morning 15 mL 11    letrozole (FEMARA) 2.5 mg Tab Take 1 tablet (2.5 mg total) by mouth once daily. 30 tablet 2    levothyroxine (SYNTHROID) 300 MCG Tab Take 1 tablet (300 mcg total) by mouth before breakfast. 30 tablet 11    multivitamin (THERAGRAN) per tablet Take 1 tablet by mouth once daily.      naloxone (NARCAN) 4 mg/actuation Spry SMARTSIG:Both Nares      palbociclib (IBRANCE) 125 mg Cap Take one capsule (125 mg) by mouth once daily on days 1-21 of each 28-day cycle. 21 capsule 11    pentoxifylline (TRENTAL) 400 mg TbSR Take 1 tablet (400 mg total) by mouth 2 (two) times daily with  meals. 60 tablet 11    potassium chloride SA (K-DUR,KLOR-CON) 20 MEQ tablet Take 1 tablet (20 mEq total) by mouth once daily. Take daily for 3 days. 3 tablet 1    vitamin E 1000 UNIT capsule Take 1 capsule (1,000 Units total) by mouth once daily. 30 capsule 11    clopidogreL (PLAVIX) 75 mg tablet Take 1 tablet (75 mg total) by mouth once daily. 30 tablet 11    [DISCONTINUED] OLANZapine (ZYPREXA) 5 MG tablet Take 1 tablet (5 mg total) by mouth every evening. 30 tablet 2     No current facility-administered medications on file prior to visit.       Review of patient's allergies indicates:   Allergen Reactions    Gabapentin Swelling     Note: unilateral joint edema, unclear if due to gabapentin    Nsaids (non-steroidal anti-inflammatory drug) Other (See Comments)     Instructed not to take NSAID drugs with chemo pill.    Zometa [zoledronic acid] Other (See Comments)     Possible osteonecrosis jaw    Clindamycin Rash    Vancomycin analogues Itching       Social History     Socioeconomic History    Marital status:     Number of children: 2   Tobacco Use    Smoking status: Every Day     Current packs/day: 1.00     Average packs/day: 1 pack/day for 39.5 years (39.5 ttl pk-yrs)     Types: Cigarettes     Start date: 1/1/1985     Passive exposure: Current    Smokeless tobacco: Never    Tobacco comments:     45 pack year history   Substance and Sexual Activity    Alcohol use: No    Drug use: No    Sexual activity: Not Currently     Partners: Male     Birth control/protection: None   Social History Narrative     with two adult children; common law marriage (10+years); raised Sabianism, no current Moravian practice     Social Determinants of Health     Financial Resource Strain: Medium Risk (5/27/2024)    Received from [facility] and Its Subsidiaries and Affiliates    Overall Financial Resource Strain (CARDIA)     Difficulty of Paying Living Expenses: Somewhat hard   Food  Insecurity: No Food Insecurity (5/27/2024)    Received from [facility] and Its Subsidiaries and Affiliates    Hunger Vital Sign     Worried About Running Out of Food in the Last Year: Never true     Ran Out of Food in the Last Year: Never true   Transportation Needs: No Transportation Needs (5/27/2024)    [facility]    PRAPARE - Transportation     Lack of Transportation (Medical): No     Lack of Transportation (Non-Medical): No   Physical Activity: Inactive (5/27/2024)    [facility]    Exercise Vital Sign     Days of Exercise per Week: 0 days     Minutes of Exercise per Session: 0 min   Stress: Stress Concern Present (5/27/2024)    [facility]    [employer] - Occupational Stress Questionnaire     Feeling of Stress : Very much   Housing Stability: Unknown (11/27/2023)    Housing Stability Vital Sign     Unable to Pay for Housing in the Last Year: No     Unstable Housing in the Last Year: No       Family History   Problem Relation Name Age of Onset    Arthritis Mother Beatrice     Early death Mother Beatrice         64 at time of death    Heart attack Mother Beatrice     Heart disease Mother Beatrice     Heart failure Mother Beatrice     Hypertension Mother Beatrice     Coronary artery disease Father David     Hearing loss Father David     Heart disease Father David     Hyperlipidemia Father David     Hypertension Father David     Mental illness Father David     Learning disabilities Son Keyur     Cancer Maternal Aunt         Review of Systems   Constitutional:  Negative for appetite change, fever and unexpected weight change.   HENT:  Negative for postnasal drip, rhinorrhea,  sneezing, sore throat and trouble swallowing.    Eyes:  Negative for visual disturbance.   Respiratory:  Negative for cough, shortness of breath and wheezing.    Cardiovascular:  Negative for chest pain, palpitations and leg swelling.   Gastrointestinal:  Positive for abdominal pain. Negative for blood in stool, constipation, diarrhea, nausea and vomiting.   Genitourinary:  Negative for dysuria.   Musculoskeletal:  Negative for arthralgias, joint swelling and myalgias.   Skin:  Negative for color change, pallor and rash.   Neurological:  Negative for weakness, light-headedness, numbness and headaches.   Hematological:  Negative for adenopathy. Does not bruise/bleed easily.   Psychiatric/Behavioral:  Negative for agitation.            Objective:   Vitals:   Vitals:    07/16/24 1110   BP: 110/80   Pulse: 106       Physical Exam  Vitals reviewed.   Constitutional:       General: She is not in acute distress.     Appearance: She is not diaphoretic.   HENT:      Head: Normocephalic and atraumatic.      Mouth/Throat:      Pharynx: No oropharyngeal exudate.   Eyes:      General: No scleral icterus.        Right eye: No discharge.         Left eye: No discharge.      Conjunctiva/sclera: Conjunctivae normal.      Pupils: Pupils are equal, round, and reactive to light.   Cardiovascular:      Rate and Rhythm: Normal rate and regular rhythm.   Abdominal:      General: There is no distension.      Palpations: Abdomen is soft. There is no mass.      Tenderness: There is no abdominal tenderness. There is no guarding.   Musculoskeletal:         General: Signs of injury (RLE) present. Normal range of motion.      Cervical back: Normal range of motion.   Skin:     General: Skin is warm and dry.      Coloration: Skin is not pale.      Findings: No erythema or rash.   Neurological:      Mental Status: She is alert and oriented to person, place, and time.           IMPRESSION     Problem List Items Addressed This Visit          GI     Lower abdominal pain - Primary    Relevant Orders    X-Ray Abdomen AP 1 View    Ambulatory referral/consult to Endo Procedure      Other Visit Diagnoses       Abdominal cramping                PLANS:    - KUB to assess stool burden and r/o constipation as a cause of symptoms since patient reports small Bms and has had low dose narcotic use  - Schedule for colonoscopy as well for evaluation and for screening. Suprep e-scribed to patient's pharmacy  - Further recommendations pending the above    Lower abdominal pain  -     X-Ray Abdomen AP 1 View; Future; Expected date: 07/16/2024  -     Ambulatory referral/consult to Endo Procedure ; Future; Expected date: 07/17/2024    Abdominal cramping  -     Ambulatory referral/consult to Gastroenterology    Other orders  -     sodium,potassium,mag sulfates (SUPREP BOWEL PREP KIT) 17.5-3.13-1.6 gram SolR; Take 177 mLs by mouth once daily. for 2 days  Dispense: 1 kit; Refill: 0        Doreen Beckman MD  Gastroenterology

## 2024-07-17 ENCOUNTER — HOSPITAL ENCOUNTER (OUTPATIENT)
Dept: PREADMISSION TESTING | Facility: HOSPITAL | Age: 54
Discharge: HOME | End: 2024-07-17
Attending: INTERNAL MEDICINE
Payer: MEDICAID

## 2024-07-17 ENCOUNTER — OFFICE VISIT (OUTPATIENT)
Dept: HEMATOLOGY/ONCOLOGY | Facility: CLINIC | Age: 54
End: 2024-07-17
Payer: MEDICAID

## 2024-07-17 ENCOUNTER — INFUSION (OUTPATIENT)
Dept: INFUSION THERAPY | Facility: HOSPITAL | Age: 54
End: 2024-07-17
Attending: INTERNAL MEDICINE
Payer: MEDICAID

## 2024-07-17 VITALS
SYSTOLIC BLOOD PRESSURE: 121 MMHG | OXYGEN SATURATION: 96 % | DIASTOLIC BLOOD PRESSURE: 77 MMHG | TEMPERATURE: 97 F | HEART RATE: 76 BPM | RESPIRATION RATE: 18 BRPM

## 2024-07-17 VITALS
OXYGEN SATURATION: 98 % | HEIGHT: 67 IN | DIASTOLIC BLOOD PRESSURE: 68 MMHG | SYSTOLIC BLOOD PRESSURE: 111 MMHG | TEMPERATURE: 98 F | BODY MASS INDEX: 35.87 KG/M2 | HEART RATE: 76 BPM

## 2024-07-17 DIAGNOSIS — C50.919 PRIMARY MALIGNANT NEOPLASM OF BREAST WITH METASTASIS TO OTHER SITE, UNSPECIFIED LATERALITY: ICD-10-CM

## 2024-07-17 DIAGNOSIS — Z17.0 MALIGNANT NEOPLASM OF OVERLAPPING SITES OF RIGHT BREAST IN FEMALE, ESTROGEN RECEPTOR POSITIVE: Primary | ICD-10-CM

## 2024-07-17 DIAGNOSIS — C73 PAPILLARY THYROID CARCINOMA: ICD-10-CM

## 2024-07-17 DIAGNOSIS — C50.811 MALIGNANT NEOPLASM OF OVERLAPPING SITES OF RIGHT BREAST IN FEMALE, ESTROGEN RECEPTOR POSITIVE: Primary | ICD-10-CM

## 2024-07-17 DIAGNOSIS — C79.51 SECONDARY CANCER OF BONE: ICD-10-CM

## 2024-07-17 DIAGNOSIS — C77.3 MALIGNANT NEOPLASM METASTATIC TO LYMPH NODE OF AXILLA: ICD-10-CM

## 2024-07-17 DIAGNOSIS — R10.30 LOWER ABDOMINAL PAIN: Primary | ICD-10-CM

## 2024-07-17 DIAGNOSIS — R19.7 DIARRHEA, UNSPECIFIED TYPE: ICD-10-CM

## 2024-07-17 DIAGNOSIS — C77.3 MALIGNANT NEOPLASM METASTATIC TO LYMPH NODE OF AXILLA: Primary | ICD-10-CM

## 2024-07-17 PROCEDURE — 36593 DECLOT VASCULAR DEVICE: CPT

## 2024-07-17 PROCEDURE — 3074F SYST BP LT 130 MM HG: CPT | Mod: CPTII,,, | Performed by: INTERNAL MEDICINE

## 2024-07-17 PROCEDURE — 25000003 PHARM REV CODE 250

## 2024-07-17 PROCEDURE — 3008F BODY MASS INDEX DOCD: CPT | Mod: CPTII,,, | Performed by: INTERNAL MEDICINE

## 2024-07-17 PROCEDURE — 63600175 PHARM REV CODE 636 W HCPCS

## 2024-07-17 PROCEDURE — A4216 STERILE WATER/SALINE, 10 ML: HCPCS

## 2024-07-17 PROCEDURE — 63600175 PHARM REV CODE 636 W HCPCS: Mod: JZ,JG

## 2024-07-17 PROCEDURE — 99214 OFFICE O/P EST MOD 30 MIN: CPT | Mod: S$PBB,,, | Performed by: INTERNAL MEDICINE

## 2024-07-17 PROCEDURE — 99214 OFFICE O/P EST MOD 30 MIN: CPT | Mod: PBBFAC,25 | Performed by: INTERNAL MEDICINE

## 2024-07-17 PROCEDURE — 3078F DIAST BP <80 MM HG: CPT | Mod: CPTII,,, | Performed by: INTERNAL MEDICINE

## 2024-07-17 PROCEDURE — 99999 PR PBB SHADOW E&M-EST. PATIENT-LVL IV: CPT | Mod: PBBFAC,,, | Performed by: INTERNAL MEDICINE

## 2024-07-17 RX ORDER — SODIUM CHLORIDE 0.9 % (FLUSH) 0.9 %
10 SYRINGE (ML) INJECTION
OUTPATIENT
Start: 2024-07-17

## 2024-07-17 RX ORDER — HEPARIN 100 UNIT/ML
500 SYRINGE INTRAVENOUS
OUTPATIENT
Start: 2024-07-17

## 2024-07-17 RX ORDER — SODIUM CHLORIDE 0.9 % (FLUSH) 0.9 %
10 SYRINGE (ML) INJECTION
Status: DISCONTINUED | OUTPATIENT
Start: 2024-07-17 | End: 2024-07-17 | Stop reason: HOSPADM

## 2024-07-17 RX ORDER — HEPARIN 100 UNIT/ML
500 SYRINGE INTRAVENOUS
Status: DISCONTINUED | OUTPATIENT
Start: 2024-07-17 | End: 2024-07-17 | Stop reason: HOSPADM

## 2024-07-17 RX ADMIN — HEPARIN 500 UNITS: 100 SYRINGE at 01:07

## 2024-07-17 RX ADMIN — SODIUM CHLORIDE, PRESERVATIVE FREE 10 ML: 5 INJECTION INTRAVENOUS at 01:07

## 2024-07-17 RX ADMIN — ALTEPLASE 2 MG: 2.2 INJECTION, POWDER, LYOPHILIZED, FOR SOLUTION INTRAVENOUS at 12:07

## 2024-07-17 NOTE — NURSING
Patient here today for port flush. PAC accessed via sterile technique with 20g 1 in tomlin needle without difficulty. No blood return. Patient agreeable to receive a dose of cathflo. 1217 cathflo administer.    1310 Verified blood return. 10ml blood wasted. PAC flushed with Heparin. Patient to view appt on [facility].

## 2024-07-17 NOTE — DISCHARGE INSTRUCTIONS
.[facility]  [address] [facility]  [address]  [phone] phone     [phone] fax  Hours of Operation: Monday- Friday 8:00am- 5:00pm  After hours phone  [phone]  Hematology / Oncology Physicians on call    Dr. Jake Knapp        Nurse Practitioners:    Argenis Sy, VINNY Dee, VINNY Prieto, VINNY Olivas, VINNY Parson, PA      Please don't hesitate to call if you have any concerns.

## 2024-07-17 NOTE — PROGRESS NOTES
[facility]  [address] [facility] [address]  Phone: [phone];  Fax: [phone]    Patient ID: Josey Flores   Chief Complaint: Follow-up  MRN:  [id number]     Oncologic Diagnosis:    - Stage IV cervical squamous cell carcinoma  - Stage IV, T2 N1b M1, invasive breast carcinoma, ER/CT+, HER2-  - Papillary thyroid carcinoma status post total thyroidectomy on 08/31/2017, mpT1b N0   - Cervical HSIL MIREILLE 3 s/p LEEP, 2017  Previous Treatment:    - Palliative chemotherapy: cisplatin, paclitaxel and bevacizumab; 02/22/2021 cycle 1 day 1 -> bevacizumab maintenance after 6 cycles  - Carboplatin, paclitaxel and pembrolizumab, C1D1 1/28/22; maintenance pembrolizumab, d/c 9/2022  Current Treatment:    - Letrozole and palbociclib   Subjective   Josey Flores is a 53 y.o. female who presents to clinic for follow up and is accompanied by her spouse.      The patient reports that she is compliant with levothyroxine.  She continues to have the below documented ROS symptoms and her chronic symptoms remain unchanged.  She has re-established care with palliative care.  Her cough has improved and she is following up with gastroenterology regarding her abdominal pain.  She has had surgery on her right limb.  We will continue with current treatment plan.    Review of Systems   Constitutional:  Positive for appetite change (decreased). Negative for activity change, chills, diaphoresis, fatigue, fever and unexpected weight change.   HENT:  Negative for nosebleeds.    Respiratory:  Negative for cough and shortness of breath.    Cardiovascular:  Negative for chest pain.   Gastrointestinal:  Positive for abdominal pain and diarrhea. Negative for abdominal distention, anal bleeding, blood in stool, constipation, nausea and vomiting.   Genitourinary:  Negative for difficulty urinating and hematuria.   Musculoskeletal:  Positive for arthralgias. Negative for back pain and myalgias.   Skin:   Negative for rash.   Neurological:  Negative for dizziness, weakness, light-headedness and headaches.   Hematological:  Does not bruise/bleed easily.   Psychiatric/Behavioral:  Positive for sleep disturbance (due to pain). The patient is not nervous/anxious.      History     Oncology History   Secondary cancer of bone    Chemotherapy    Treatment Summary   Plan Name: OP GYN PACLITAXEL CISPLATIN BEVACIZUMAB Q3W  Treatment Goal: Control  Status: Inactive  Start Date: 2/22/2021  End Date: 7/7/2021  Provider: Estefani Asencio MD  Chemotherapy: CISplatin (PLATINOL) 50 mg/m2 = 108 mg in sodium chloride 0.9% 608 mL chemo infusion, 50 mg/m2 = 108 mg, Intravenous, Clinic/HOD 1 time, 6 of 6 cycles  Administration: 108 mg (2/22/2021), 108 mg (3/15/2021), 108 mg (4/6/2021), 108 mg (5/11/2021), 108 mg (6/1/2021), 108 mg (6/22/2021)    bevacizumab (AVASTIN) 15 mg/kg = 1,480 mg in sodium chloride 0.9% 100 mL chemo infusion, 15 mg/kg = 1,480 mg, Intravenous, Clinic/HOD 1 time, 6 of 6 cycles  Administration: 1,480 mg (4/6/2021), 1,480 mg (2/23/2021), 1,480 mg (3/16/2021), 1,480 mg (6/1/2021), 1,480 mg (6/22/2021), 1,480 mg (5/12/2021)    PACLitaxeL (TAXOL) 175 mg/m2 = 378 mg in sodium chloride 0.9% 563 mL chemo infusion, 175 mg/m2 = 378 mg, Intravenous, Clinic/HOD 1 time, 6 of 6 cycles  Administration: 378 mg (2/22/2021), 378 mg (3/15/2021), 378 mg (4/6/2021), 378 mg (5/11/2021), 378 mg (6/1/2021), 378 mg (6/22/2021)    Plan Name: OP BEVACIZUMAB Q3W   Treatment Goal: Maintenance  Status: Inactive  Start Date: 7/13/2021  End Date: 12/28/2021  Provider: Estefani Asencio MD  Chemotherapy: bevacizumab (AVASTIN) 1,300 mg in sodium chloride 0.9% 100 mL chemo infusion, 1,345 mg, Intravenous, [facility] 1 time, 9 of 17 cycles  Administration: 1,300 mg (7/13/2021), 1,345 mg (8/3/2021), 1,300 mg (8/24/2021), 1,300 mg (9/14/2021), 1,345 mg (10/5/2021), 1,345 mg (10/26/2021), 1,345 mg (11/16/2021), 1,300 mg (12/7/2021), 1,300 mg  (12/28/2021)    Plan Name: OP CARBOPLATIN PACLITAXEL PEMBROLIZUMAB Q3W FOLLOWED BY MAINTENANCE PEMBROLIZUMAB 400 MG Q6W  Treatment Goal: Control  Status: Inactive  Start Date: 1/28/2022  End Date: 8/11/2022  Provider: Estefani Asencio MD  Chemotherapy: CARBOplatin (PARAPLATIN) 425 mg in sodium chloride 0.9% 292.5 mL chemo infusion, 425 mg, Intravenous, Clinic/HOD 1 time, 6 of 6 cycles  Dose modification:   (original dose 424 mg, Cycle 6),   (original dose 424 mg, Cycle 6)  Administration: 425 mg (1/28/2022), 495 mg (2/18/2022), 525 mg (6/2/2022), 525 mg (5/12/2022), 525 mg (3/11/2022), 495 mg (4/11/2022)    PACLitaxeL (TAXOL) 175 mg/m2 = 366 mg in sodium chloride 0.9% 561 mL chemo infusion, 175 mg/m2 = 366 mg (100 % of original dose 175 mg/m2), Intravenous, Clinic/HOD 1 time, 6 of 6 cycles  Dose modification: 175 mg/m2 (original dose 175 mg/m2, Cycle 1), 175 mg/m2 (original dose 175 mg/m2, Cycle 4)  Administration: 366 mg (1/28/2022), 366 mg (2/18/2022), 366 mg (3/11/2022), 366 mg (4/11/2022), 366 mg (5/12/2022), 366 mg (6/2/2022)       Malignant neoplasm of endocervix   9/10/2020 Imaging Significant Findings    CT A/P: Negative for ascites, omental caking, lymphadenopathy, or a gross cervical mass     12/7/2020 Biopsy    EMBX: SCC, p16+     1/12/2021 Imaging Significant Findings    MRI Pelvis w/ w/o  4.9 cm cervical mass  R parametrial invasion  R external iliac lymphadenopathy     1/27/2021 Imaging Significant Findings    PET/CT: FDG avidity in the chest     2/18/2021 Biopsy    Endoscopic biopsy of the lung: +      2/22/2021 - 6/29/2021 Chemotherapy    Cisplatin/paclitaxel/avastin x6     3/12/2021 Genetic Testing    STRATA: FRANK, PIK3c, Ep300, ERBB3, KDM6A     4/24/2021 Imaging Significant Findings    CT Pelvis w/: JUAN FRANCISCO with a normal cervix     4/26/2021 Imaging Significant Findings    PET/CT: No FDG avidity     5/18/2021 Imaging Significant Findings    CT A/P w/: JUAN FRANCISCO     6/5/2021 Imaging Significant Findings     CT A/P w/: JUAN FRANCISCO     7/12/2021 Imaging Significant Findings    PET/CT: JUAN FRANCISCO     7/13/2021 - 12/28/2021 Maintenance Therapy    Avastin x 9     10/20/2021 Imaging Significant Findings    PET/CT: JUAN FRANCISCO     1/11/2022 Imaging Significant Findings    PET/CT: Increased FDG avidity in the cervix.  No other evidence of disease.     1/28/2022 - 6/2/2022 Chemotherapy    Carboplatin/paclitaxel/pembrolizumab x 6     3/25/2022 Imaging Significant Findings    PET/CT: Persistent FDG avidity in the cervix.  New FDG avidity in the inguinal lymph nodes (although it does not look suspicious)     4/25/2022 - 4/29/2022 Radiation Therapy    Treating physician: Dr. Vázquez (MRI guided T&O)  Total Dose: 30 Gy  Fractions: 5     6/30/2022 - 8/11/2022 Maintenance Therapy    Pembrolizumab 400 mg IV x2     8/5/2022 Imaging Significant Findings    PET/CT: JUAN FRANCISCO     10/16/2022 Imaging Significant Findings    CT A/P w/ (Women's without imaging available): JUAN FRANCISCO     1/11/2023 Imaging Significant Findings    PET/CT: JUAN FRANCISCO      Chemotherapy    Treatment Summary   Plan Name: OP GYN PACLITAXEL CISPLATIN BEVACIZUMAB Q3W  Treatment Goal: Control  Status: Inactive  Start Date: 2/22/2021  End Date: 7/7/2021  Provider: Estefani Asencio MD  Chemotherapy: CISplatin (PLATINOL) 50 mg/m2 = 108 mg in sodium chloride 0.9% 608 mL chemo infusion, 50 mg/m2 = 108 mg, Intravenous, Clinic/HOD 1 time, 6 of 6 cycles  Administration: 108 mg (2/22/2021), 108 mg (3/15/2021), 108 mg (4/6/2021), 108 mg (5/11/2021), 108 mg (6/1/2021), 108 mg (6/22/2021)    bevacizumab (AVASTIN) 15 mg/kg = 1,480 mg in sodium chloride 0.9% 100 mL chemo infusion, 15 mg/kg = 1,480 mg, Intravenous, Clinic/HOD 1 time, 6 of 6 cycles  Administration: 1,480 mg (4/6/2021), 1,480 mg (2/23/2021), 1,480 mg (3/16/2021), 1,480 mg (6/1/2021), 1,480 mg (6/22/2021), 1,480 mg (5/12/2021)    PACLitaxeL (TAXOL) 175 mg/m2 = 378 mg in sodium chloride 0.9% 563 mL chemo infusion, 175 mg/m2 = 378 mg, Intravenous, [facility] 1  time, 6 of 6 cycles  Administration: 378 mg (2/22/2021), 378 mg (3/15/2021), 378 mg (4/6/2021), 378 mg (5/11/2021), 378 mg (6/1/2021), 378 mg (6/22/2021)    Plan Name: OP BEVACIZUMAB Q3W   Treatment Goal: Maintenance  Status: Inactive  Start Date: 7/13/2021  End Date: 12/28/2021  Provider: Estefani Asencio MD  Chemotherapy: bevacizumab (AVASTIN) 1,300 mg in sodium chloride 0.9% 100 mL chemo infusion, 1,345 mg, Intravenous, Clinic/HOD 1 time, 9 of 17 cycles  Administration: 1,300 mg (7/13/2021), 1,345 mg (8/3/2021), 1,300 mg (8/24/2021), 1,300 mg (9/14/2021), 1,345 mg (10/5/2021), 1,345 mg (10/26/2021), 1,345 mg (11/16/2021), 1,300 mg (12/7/2021), 1,300 mg (12/28/2021)    Plan Name: OP CARBOPLATIN PACLITAXEL PEMBROLIZUMAB Q3W FOLLOWED BY MAINTENANCE PEMBROLIZUMAB 400 MG Q6W  Treatment Goal: Control  Status: Inactive  Start Date: 1/28/2022  End Date: 8/11/2022  Provider: Estefani Asencio MD  Chemotherapy: CARBOplatin (PARAPLATIN) 425 mg in sodium chloride 0.9% 292.5 mL chemo infusion, 425 mg, Intravenous, Clinic/HOD 1 time, 6 of 6 cycles  Dose modification:   (original dose 424 mg, Cycle 6),   (original dose 424 mg, Cycle 6)  Administration: 425 mg (1/28/2022), 495 mg (2/18/2022), 525 mg (6/2/2022), 525 mg (5/12/2022), 525 mg (3/11/2022), 495 mg (4/11/2022)    PACLitaxeL (TAXOL) 175 mg/m2 = 366 mg in sodium chloride 0.9% 561 mL chemo infusion, 175 mg/m2 = 366 mg (100 % of original dose 175 mg/m2), Intravenous, Clinic/HOD 1 time, 6 of 6 cycles  Dose modification: 175 mg/m2 (original dose 175 mg/m2, Cycle 1), 175 mg/m2 (original dose 175 mg/m2, Cycle 4)  Administration: 366 mg (1/28/2022), 366 mg (2/18/2022), 366 mg (3/11/2022), 366 mg (4/11/2022), 366 mg (5/12/2022), 366 mg (6/2/2022)             Past Medical History:   Diagnosis Date    Anxiety     Asthma     Breast cancer 2017    Cancer     right breast, metastatic-lymph nodes, bone, and thyroid     COPD (chronic obstructive pulmonary disease)     Depression      History of psychiatric hospitalization     2000; suicidal ideation    Hx of psychiatric care     Hypothyroidism     Miscarriage     five miscarriages    Obesity     Psychiatric problem     Thyroid cancer 2017       Past Surgical History:   Procedure Laterality Date    ABSCESS DRAINAGE      bilateral axilla    ADENOIDECTOMY      APPENDECTOMY      BREAST BIOPSY Right     x3    CHOLECYSTECTOMY      ENDOBRONCHIAL ULTRASOUND Bilateral 02/18/2021    Procedure: ENDOBRONCHIAL ULTRASOUND (EBUS);  Surgeon: Jaron Ni MD;  Location: [facility] ENDO;  Service: Pulmonary;  Laterality: Bilateral;    FLUOROSCOPY N/A 01/28/2021    Procedure: Mediport placement;  Surgeon: Noah Phelan MD;  Location: [facility] CATH LAB;  Service: General;  Laterality: N/A;    MASS EXCISION      MEDIPORT INSERTION, SINGLE Right 02/2021    SKIN GRAFT  06/16/2017    THYROIDECTOMY Bilateral     TONSILLECTOMY         Family History   Problem Relation Name Age of Onset    Arthritis Mother Beatrice     Early death Mother Beatrice         64 at time of death    Heart attack Mother Beatrice     Heart disease Mother Beatrice     Heart failure Mother Beatrice     Hypertension Mother Beatrice     Coronary artery disease Father David     Hearing loss Father David     Heart disease Father David     Hyperlipidemia Father David     Hypertension Father David     Mental illness Father David     Learning disabilities Son Keyur     Cancer Maternal Aunt         Review of patient's allergies indicates:   Allergen Reactions    Gabapentin Swelling     Note: unilateral joint edema, unclear if due to gabapentin    Nsaids (non-steroidal anti-inflammatory drug) Other (See Comments)     Instructed not to take NSAID drugs with chemo pill.    Zometa [zoledronic acid] Other (See Comments)     Possible osteonecrosis jaw    Clindamycin Rash    Vancomycin analogues Itching       Social History     Tobacco Use    Smoking status: Every Day     Current packs/day: 1.00      "Average packs/day: 1 pack/day for 39.5 years (39.5 ttl pk-yrs)     Types: Cigarettes     Start date: 1/1/1985     Passive exposure: Current    Smokeless tobacco: Never    Tobacco comments:     45 pack year history   Substance Use Topics    Alcohol use: No    Drug use: No       Physical Exam   ECOG:   ECOG SCORE    2 - Capable of all selfcare but unable to carry out any work activities, active > 50% of hours          Vitals:  /68   Pulse 76   Temp 97.5 °F (36.4 °C) (Temporal)   Ht 5' 7" (1.702 m)   LMP 12/05/2020 Comment: no cycle x3 years  SpO2 98%   BMI 35.87 kg/m²     Physical Exam:  Physical Exam  Constitutional:       General: She is not in acute distress.     Appearance: Normal appearance.   HENT:      Head: Normocephalic and atraumatic.   Eyes:      Extraocular Movements: Extraocular movements intact.      Conjunctiva/sclera: Conjunctivae normal.   Cardiovascular:      Rate and Rhythm: Normal rate.   Pulmonary:      Effort: Pulmonary effort is normal. No respiratory distress.   Abdominal:      General: There is no distension.      Palpations: Abdomen is soft. There is no hepatomegaly or splenomegaly.      Tenderness: There is no abdominal tenderness. There is no guarding.   Skin:     Findings: No rash.   Neurological:      General: No focal deficit present.      Mental Status: She is alert.   Psychiatric:         Mood and Affect: Mood normal.         Behavior: Behavior normal.         Thought Content: Thought content normal.          Labs   Labs:  Lab Visit on 07/17/2024   Component Date Value Ref Range Status    Phosphorus 07/17/2024 3.0  2.7 - 4.5 mg/dL Final    Magnesium 07/17/2024 1.9  1.6 - 2.6 mg/dL Final    Sodium 07/17/2024 138  136 - 145 mmol/L Final    Potassium 07/17/2024 4.1  3.5 - 5.1 mmol/L Final    Chloride 07/17/2024 107  95 - 110 mmol/L Final    CO2 07/17/2024 22 (L)  23 - 29 mmol/L Final    Glucose 07/17/2024 107  70 - 110 mg/dL Final    BUN 07/17/2024 13  6 - 20 mg/dL Final    " Creatinine 07/17/2024 1.5 (H)  0.5 - 1.4 mg/dL Final    Calcium 07/17/2024 9.6  8.7 - 10.5 mg/dL Final    Total Protein 07/17/2024 7.0  6.0 - 8.4 g/dL Final    Albumin 07/17/2024 3.8  3.5 - 5.2 g/dL Final    Total Bilirubin 07/17/2024 0.4  0.1 - 1.0 mg/dL Final    Comment: For infants and newborns, interpretation of results should be based  on gestational age, weight and in agreement with clinical  observations.    Premature Infant recommended reference ranges:  Up to 24 hours.............<8.0 mg/dL  Up to 48 hours............<12.0 mg/dL  3-5 days..................<15.0 mg/dL  6-29 days.................<15.0 mg/dL      Alkaline Phosphatase 07/17/2024 87  55 - 135 U/L Final    AST 07/17/2024 19  10 - 40 U/L Final    ALT 07/17/2024 24  10 - 44 U/L Final    eGFR 07/17/2024 41 (A)  >60 mL/min/1.73 m^2 Final    Anion Gap 07/17/2024 9  8 - 16 mmol/L Final    WBC 07/17/2024 7.90  3.90 - 12.70 K/uL Final    RBC 07/17/2024 3.92 (L)  4.00 - 5.40 M/uL Final    Hemoglobin 07/17/2024 13.3  12.0 - 16.0 g/dL Final    Hematocrit 07/17/2024 39.7  37.0 - 48.5 % Final    MCV 07/17/2024 101 (H)  82 - 98 fL Final    MCH 07/17/2024 33.9 (H)  27.0 - 31.0 pg Final    MCHC 07/17/2024 33.5  32.0 - 36.0 g/dL Final    RDW 07/17/2024 14.3  11.5 - 14.5 % Final    Platelets 07/17/2024 208  150 - 450 K/uL Final    MPV 07/17/2024 12.1  9.2 - 12.9 fL Final    Immature Granulocytes 07/17/2024 1.0 (H)  0.0 - 0.5 % Final    Gran # (ANC) 07/17/2024 4.8  1.8 - 7.7 K/uL Final    Immature Grans (Abs) 07/17/2024 0.08 (H)  0.00 - 0.04 K/uL Final    Comment: Mild elevation in immature granulocytes is non specific and   can be seen in a variety of conditions including stress response,   acute inflammation, trauma and pregnancy. Correlation with other   laboratory and clinical findings is essential.      Lymph # 07/17/2024 2.1  1.0 - 4.8 K/uL Final    Mono # 07/17/2024 0.7  0.3 - 1.0 K/uL Final    Eos # 07/17/2024 0.2  0.0 - 0.5 K/uL Final    Baso # 07/17/2024  0.06  0.00 - 0.20 K/uL Final    nRBC 07/17/2024 0  0 /100 WBC Final    Gran % 07/17/2024 60.3  38.0 - 73.0 % Final    Lymph % 07/17/2024 27.1  18.0 - 48.0 % Final    Mono % 07/17/2024 8.4  4.0 - 15.0 % Final    Eosinophil % 07/17/2024 2.4  0.0 - 8.0 % Final    Basophil % 07/17/2024 0.8  0.0 - 1.9 % Final    Differential Method 07/17/2024 Automated   Final    LD 07/17/2024 212  110 - 260 U/L Final    Results are increased in hemolyzed samples.    TSH 07/17/2024 37.223 (H)  0.400 - 4.000 uIU/mL Final    Free T4 07/17/2024 1.29  0.71 - 1.51 ng/dL Final        Imaging   CT Chest Abdomen Without Contrast (XPD)  - 01/13/2024  Impression:  Stable exam.  7 mm right nodule, unchanged.  Calcified granuloma with calcified right hilar lymph nodes.  Fatty infiltration of the liver.  Status post cholecystectomy.         CT CHEST ABDOMEN PELVIS WITHOUT CONTRAST(XPD) - 04/15/24  COMPARISON:  01/13/2024     FINDINGS:  CT of chest without contrast:     The pulmonary window images demonstrate stable appearance of a calcified granuloma located laterally in the right upper lobe and a linear density more characteristic of localized pulmonary scarring also seen laterally in the right upper lobe (axial image 179).  Centrilobular emphysema is also visible in the upper lobes bilaterally, more severe on the right.  No pleural effusion or lung consolidation is appreciated.  No pathologic lymph node enlargement is visible in the mediastinum or axilla bilaterally.  Dense lymph node calcification associated with granulomatous disease is again noted in the mediastinum and right hilum.  Heart size is normal.  No chest wall abnormalities are appreciated except for evidence of apparent prior breast biopsy on the right.  A MediPort is also seen entering the right internal jugular vein.     CT of abdomen and pelvis without contrast:     The liver is enlarged measuring 22.7 cm in its craniocaudal dimension.  Decreased attenuation is also visible in the  liver parenchyma consistent with generalized hepatic steatosis.  The spleen appears normal except for multiple incidental punctate granulomatous calcifications.  The pancreas and adrenals appear normal.  The gallbladder is surgically absent.  No parenchymal abnormality, hydronephrosis or intrarenal calculi are visible in either kidney.  No free intraperitoneal fluid or lymph node enlargement is identified.     Images obtained through the pelvis demonstrate no gross abnormality in an incompletely distended urinary bladder.  The uterus and ovaries are surgically absent.  No free pelvic fluid or abnormal soft tissue masses are identified.     Impression:  Emphysematous changes, most severe in the right upper lobe.  Stable faint noncalcified nodular focal pulmonary scarring in the right upper lobe compared to 01/13/2024.  Hepatomegaly and generalized hepatic steatosis.  Status post cholecystectomy and hysterectomy.    Assessment and Plan   Stage IV (T2 N1b M1) Breast Cancer, +/+/-  Restaging CT CAP 04/15/24 Stable  Continue Ibrance and Letrozole      Abdominal Cramping  Diarrhea  Patient reports severe abdominal cramping after bowel movements; also having diarrhea 2-3x/day (likely 2/2 Ibrance); no BRBPR or melena  Hx of pelvic radiation  Trying Imodium with mild relief; also taking Lomotil  Gastroenterology planning on colonoscopy; patient inquired about blood thinner, however appears she is on Plavix and c-scope instructions will guide her on endoscopists preferred duration of holding it      Bony metastatic disease:    Previously on bisphosphonate complicated by osteonecrosis of the jaw following with ENT  Uncontrolled pain; semi-recently discontinued pain medication  Refer back to palliative care       Metastatic Cervical squamous cell carcinoma   History of HSIL Status post LEEP 2017.   Dec 2020 patient had vaginal bleeding and MRI pelvis showed LAD  TB discussion 01/29/21: consensus for systemic chemotherapy given  advanced cervical squamous cell carcinoma with cisplatin, paclitaxel and bevacizumab with discontinuation of palbociclib but can continue aromatase inhibitor; taxane may also be active for her concomitant metastatic breast cancer.   Renetta genetic testing negative  Treated with 6C (initiated 02/21/21) of Cis/Paclitaxel/Karina with improvement of disease and started on maintenance Karina   PET-CT January 2022 notable for uptake in cervical region concerning for oligo progression/recurrence of her disease.  Previously biopsy proven metastatic cervical cancer in lungs without evidence of recurrent mass/lymphadenopathy/avidity in this region or otherwise.  Treated with chemo-immunotherapy (C1 Pembrolizumab and Carbo/Paclitaxel)  and vaginal brachy therapy([facility])  Restaging scans after this showed no evidence of recurrence and decision to hold therapy made; she was resumed on Ibrance for metastatic breast cancer  Continue with surveillance imaging       Chronic Medical Conditions  Hx of CVA June 2023  COPD  History of papillary thyroid carcinoma status post total thyroidectomy on 08/31/2017:   s/p total thyroidectomy on levothyroxine.   Hypothyroidism:   Previously on levothyroxine 275 mcg daily.    TFTs normalized   Increased Synthroid to 300 mcg daily and TFTs improved.  Continue Synthroid 300 mcg daily  Continue repeat thyroid function labs for monitoring.  Refer to endocrinology   Macrocytosis:   Possibly related to thyroid disorder will continue to monitor.  She is on vitamin-B supplement.  No new anemia.  Neuropathy:     Mild, will monitor   Tobacco abuse:    Precontemplative; continued cessation encouraged        Med Onc Chart Routing      Follow up with physician 4 weeks. Review imaging   Follow up with MIGUEL    Infusion scheduling note   port flush today and in 4 weeks   Injection scheduling note    Labs CBC, CMP, phosphorus and magnesium   Scheduling:  Preferred lab:  Lab interval:     Imaging CT chest abdomen  pelvis   Schedule CT mid Aug prior to MD appt   Pharmacy appointment    Other referrals                  The patient was seen, interviewed and examined. Pertinent lab and radiologic studies were reviewed. Pt instructed to call should they develop concerning signs/symptoms or have further questions.        Portions of the record may have been created with voice recognition software. Occasional wrong-word or sound-a-like substitutions may have occurred due to the inherent limitations of voice recognition software. Read the chart carefully and recognize, using context, where substitutions have occurred.      Donita Nuñez MD    Hematology/Oncology

## 2024-07-19 ENCOUNTER — TELEPHONE (OUTPATIENT)
Dept: HEMATOLOGY/ONCOLOGY | Facility: CLINIC | Age: 54
End: 2024-07-19
Payer: MEDICAID

## 2024-07-19 NOTE — TELEPHONE ENCOUNTER
SW attempted to reach pt to discuss recent distress score. Pt was asleep per sig other and would call SW back. SW will remain available.

## 2024-07-22 ENCOUNTER — E-CONSULT (OUTPATIENT)
Dept: CARDIOLOGY | Facility: CLINIC | Age: 54
End: 2024-07-22
Payer: MEDICAID

## 2024-07-22 DIAGNOSIS — Z01.810 PREOP CARDIOVASCULAR EXAM: Primary | ICD-10-CM

## 2024-07-22 PROCEDURE — 99451 NTRPROF PH1/NTRNET/EHR 5/>: CPT | Mod: S$PBB,,, | Performed by: INTERNAL MEDICINE

## 2024-07-22 NOTE — CONSULTS
[facility]  Response for E-Consult     Patient Name: Josey Flores  MRN: [id number]  Primary Care Provider: Kishore Persaud MD   Requesting Provider: Marie Persaud NP  E-Consult to Cardiology  Consult performed by: Fili Eckert MD  Consult ordered by: Marie Persaud, VINNY           52 yo f, E consult for preop clearance of colonoscopy  The chart reviewed.  PMH h/o cva  Echo showed EF nml    Plan  Elevated periop risk of CV events for non-high risk procedure.  Ok to proceed the scheduled procedure without further cardiac study.  OK to hold Plavix 5 days before the procedure and resume postop once hemodynamically stable      Total time of Consultation: 10 minute    I did not speak to the requesting provider verbally about this.     *This eConsult is based on the clinical data available to me and is furnished without benefit of a physical examination. The eConsult will need to be interpreted in light of any clinical issues or changes in patient status not available to me at the time of filing this eConsults. Significant changes in patient condition or level of acuity should result in immediate formal consultation and reevaluation. Please alert me if you have further questions.    Thank you for this eConsult referral.     Fili Eckert MD  The [address]

## 2024-07-23 ENCOUNTER — PATIENT MESSAGE (OUTPATIENT)
Dept: PALLIATIVE MEDICINE | Facility: CLINIC | Age: 54
End: 2024-07-23

## 2024-07-23 ENCOUNTER — OFFICE VISIT (OUTPATIENT)
Dept: PALLIATIVE MEDICINE | Facility: CLINIC | Age: 54
End: 2024-07-23
Payer: MEDICAID

## 2024-07-23 VITALS
HEART RATE: 87 BPM | BODY MASS INDEX: 35.87 KG/M2 | HEIGHT: 67 IN | DIASTOLIC BLOOD PRESSURE: 69 MMHG | SYSTOLIC BLOOD PRESSURE: 102 MMHG | OXYGEN SATURATION: 98 % | TEMPERATURE: 98 F

## 2024-07-23 DIAGNOSIS — C50.811 MALIGNANT NEOPLASM OF OVERLAPPING SITES OF RIGHT BREAST IN FEMALE, ESTROGEN RECEPTOR POSITIVE: ICD-10-CM

## 2024-07-23 DIAGNOSIS — Z17.0 MALIGNANT NEOPLASM OF OVERLAPPING SITES OF RIGHT BREAST IN FEMALE, ESTROGEN RECEPTOR POSITIVE: ICD-10-CM

## 2024-07-23 DIAGNOSIS — S92.901A UNSPECIFIED FRACTURE OF RIGHT FOOT, INITIAL ENCOUNTER FOR CLOSED FRACTURE: ICD-10-CM

## 2024-07-23 DIAGNOSIS — F32.A ANXIETY AND DEPRESSION: ICD-10-CM

## 2024-07-23 DIAGNOSIS — F41.9 ANXIETY AND DEPRESSION: ICD-10-CM

## 2024-07-23 DIAGNOSIS — G89.3 NEOPLASM RELATED PAIN: ICD-10-CM

## 2024-07-23 DIAGNOSIS — C79.81 METASTATIC MALIGNANT NEOPLASM TO BREAST: Primary | ICD-10-CM

## 2024-07-23 DIAGNOSIS — R45.89 ANXIETY ABOUT HEALTH: ICD-10-CM

## 2024-07-23 DIAGNOSIS — Z51.5 PALLIATIVE CARE ENCOUNTER: ICD-10-CM

## 2024-07-23 DIAGNOSIS — R10.30 LOWER ABDOMINAL PAIN: ICD-10-CM

## 2024-07-23 PROCEDURE — 3078F DIAST BP <80 MM HG: CPT | Mod: CPTII,,,

## 2024-07-23 PROCEDURE — 1159F MED LIST DOCD IN RCRD: CPT | Mod: CPTII,,,

## 2024-07-23 PROCEDURE — 3074F SYST BP LT 130 MM HG: CPT | Mod: CPTII,,,

## 2024-07-23 PROCEDURE — 99999 PR PBB SHADOW E&M-EST. PATIENT-LVL IV: CPT | Mod: PBBFAC,,,

## 2024-07-23 PROCEDURE — 99214 OFFICE O/P EST MOD 30 MIN: CPT | Mod: S$PBB,,,

## 2024-07-23 PROCEDURE — 3008F BODY MASS INDEX DOCD: CPT | Mod: CPTII,,,

## 2024-07-23 PROCEDURE — 99214 OFFICE O/P EST MOD 30 MIN: CPT | Mod: PBBFAC

## 2024-07-23 RX ORDER — ALPRAZOLAM 0.5 MG/1
0.5 TABLET ORAL 2 TIMES DAILY PRN
Qty: 60 TABLET | Refills: 0 | Status: SHIPPED | OUTPATIENT
Start: 2024-07-23 | End: 2024-08-22

## 2024-07-23 RX ORDER — DULOXETIN HYDROCHLORIDE 60 MG/1
60 CAPSULE, DELAYED RELEASE ORAL DAILY
Qty: 30 CAPSULE | Refills: 2 | Status: SHIPPED | OUTPATIENT
Start: 2024-07-23 | End: 2024-10-21

## 2024-07-23 NOTE — PATIENT INSTRUCTIONS
Please take your Cymbalta 60mg  daily   Please message EB to tell her how many Norcos you have left

## 2024-07-23 NOTE — PROGRESS NOTES
Palliative Medicine Clinic Note  Follow-up        Consult Requested By: No ref. provider found      Reason for Consult: Symptom  Management    Chief Complaint: Right ankle/foot fracture/pain and decreased mobility.           ASSESSMENT/PLAN:      Plan/Recommendations:    Problem List Items Addressed This Visit          Psychiatric    Anxiety and depression     Anxiety/Depression  Related to Cancer diagnosis and financial stress.   Xanax 0.5mg BID PRN   Continue Duloxetine 60mg daily. Crcl 69.5ml/min  Pt declined mental health counseling referral at this time  SW consult to assist with financial resources.             Relevant Medications    DULoxetine (CYMBALTA) 60 MG capsule    ALPRAZolam (XANAX) 0.5 MG tablet       Oncology    Malignant neoplasm of overlapping sites of right breast in female, estrogen receptor positive     -Followed by Dr. Ruff. Previously Dr. Joseph, Dr. Asencio  -On Surveillance- Continues Letrozole and Ibrance  -CT A/P: 05/02/2024: Localized mildly dilated proximal jejunal bowel loops with more normal-caliber   mid to distal small bowel and colon. Findings raise suspicion for proximal   partial small-bowel obstruction versus localized ileus/gastroenteritis.   No evidence of metastatic disease within the abdomen or pelvis.   Stable low-density fatty liver changes.   Stable mixed lytic and sclerotic lesions within the pelvis and T9 marrow space   likely representing treated metastatic disease. No new osseous lesions   identified.   -PET scan 05/03/2023: There is a focal mildly hypermetabolic ground-glass opacity in the left lower lobe which is new from the prior examination. This is more likely infectious/inflammatory in nature. Short-term CT chest follow-up is advised.         Relevant Medications    HYDROcodone-acetaminophen (NORCO)  mg per tablet    Neoplasm related pain     Neoplasm related pain  Uncontrolled- Due to right ankle/foot fracture.   -Norco 10mg TID PRN  -Pt followed by  Dr. Prince at  Orthopedics.   -Post right ankle fixation on June 13, 2024.   -Plans to wean Norco back to 5mg TID PRN once right ankle/foot fracture pain improves.   -Patient instructed to contact me if 3 day supply of meds left.   -Narcan RX given and explained use to pt and family. Pt and family verbalized understanding.    -Pain Contract-4/18/2023  -UDS- Collected 7/23/ 2024- Results pending          Relevant Medications    HYDROcodone-acetaminophen (NORCO)  mg per tablet       GI    Lower abdominal pain     Colonoscopy Aug 05, 2024  Followed by Dr. Beckman- GI   X-ray AB: 07/04/2024:   FINDINGS:  Bone structures appear normal for patient age.  No abnormal calcifications are visible.  There are surgical clips in the right upper quadrant associated with prior cholecystectomy and 2 additional indeterminate sutures projecting in the mid pelvis.  No bowel distention, free air or abnormal mass effect is identified.  Retained stool is not significantly increased and is visible in the ascending and proximal transverse segments.  The lung bases are clear.  Impression:   Radiographically benign abdomen.  A small amount of retained stool is visible in the ascending and proximal transverse segments of the colon.               Palliative Care    Palliative care encounter     -Code status: DNR w/Selective treatment. LaPOST 01/6/2022.   -HCPOA: Sister: David Shuff: [phone]   -GOC:  Continue cancer treatment, symptom management, maintain independence and functional status.   -See past visit notes for further details           Other Visit Diagnoses       Metastatic malignant neoplasm to breast    -  Primary    Relevant Medications    DULoxetine (CYMBALTA) 60 MG capsule    ALPRAZolam (XANAX) 0.5 MG tablet    HYDROcodone-acetaminophen (NORCO)  mg per tablet    Anxiety about health        Unspecified fracture of right foot, initial encounter for closed fracture        Relevant Medications     HYDROcodone-acetaminophen (NORCO)  mg per tablet                      Advance Care Planning   Advance Directives:   Living Will: Yes        Copy on chart: Yes    LaPOST: Yes    Do Not Resuscitate Status: Yes    Medical Power of : Yes    Agent's Name:  Sister: David Acevedo   Agent's Contact Number:  [phone]    Decision Making:  Patient answered questions  Goals of Care: The patient endorses that what is most important right now is to focus on remaining as independent as possible, symptom/pain control, and quality of life, even if it means sacrificing a little time    Accordingly, we have decided that the best plan to meet the patient's goals includes continuing with treatment.        Follow up: 1 month re: pain management      Plan discussed with Patient and spouse.     SUBJECTIVE:      History of Present Illness / Interval History:  Josey Flores is a 52 y.o. female with history of metastatic breast cancer, thyroid cancer post thyroidectomy (2017), and cervical cancer (2017). She is on surveillance on Letrozole and Ibrance.  S/p stroke with left-sided weakness (June 02, 2023).   Right foot fracture 05/16/2024- Post fixation June 13, 2024- Dr. Prince ([facility]).   She is followed by Dr. Ruff.  Presents to Palliative Care Clinic for pain and anxiety.   Please see previous palliative care notes for more details on pt's care so far.       7/23/24: History obtained from patient and medical record.   Pt attended clinic with her , Mr. Mccallum. Seen sitting in wheelchair. No acute distress. Vital signs stable on room air.  Right leg pain stable on Norco 10mg TID PRN. Able to complete light active ROM exercises. Next f/u visit w/ Dr. Prince (Ortho) July 30th, 2024.   Persistent anxiety depression due to financial stress and recent right leg injury. She is unsure if she has Duloxetine at home. She takes Xanax  more often.   We have discussed the importance of taking Duloxetine  to manage her mood, instead of taking Xanax alone.   Colonoscopy scheduled for Aug 05, 2024 per GI (Dr. Beckman) recommendations.        Past visits:   6/25/2024: Pt attended clinic with her , Mr. Mccallum. Seen sitting in wheelchair. She appeared uncomfortable.   She reported worsening right leg/foot pain following fixation on June 13, 2024 by Dr. Prince at [facility]. Follow-up on July 2nd.   She is taking Norco 10mg Q8H PRN with little relief.   Persistent abdominal cramping pending GI visit with Dr. Beckman on July 16, 2024.  She her mood is low due to decreased mobility post right ankle fracture and persistent pain.   She requested assistance with obtaining a wheelchair through DME. SW aware.      06/11/2024: History obtained from: Patient  Pt seen via audiovisual feed. A&O x3. Seen sitting in a car. No acute distress.   She reported persistent right ankle pain due to right foot fracture on May 16, 2024  She has been taking Norco 10mg Q6-8H PRN due to worsening pain.   She reported she is scheduled to undergo fixation of the right ankle on June 13th, 2024 by Dr. Prince at [facility].   Her mood is stable  on Duloxetine and Xanax PRN.     05/09/2024: History obtained from: Patient  and , Mr. Mccallum.   Pt attended clinic with her , Mr. Mccallum. She appeared uncomfortable.   Vital signs stable on room air.   She reported persistent lower abd/pelvic pain and bilateral ribs pain. She has been takin OTC Acetaminophen with no significant effect.   She saw Dr. Zurita (Chronic Pain). He increased Duloxetine and Lyrica, but Norco was discontinued.   She was previously on Norco 10mg QID PRN. Last 30-day refill was Dec. 2023.   She attempted to wean to Norco 5mg TID, but has been unsuccessful.   She has been anxious and depressed due to financial stress. Last Xanax refill Jan. 2024.   No follow-up with her PCP or chronic pain since January 2024.       1/11/2024:   Pt attended  clinic with her partner, Mr. Mccallum. She appeared uncomfortable. Vital signs stable on room air.   She has established care with chronic pain provider, Dr. Zurita  She reported she is now taking Lyrica and Duloxetine 60mg daily.   However, she has noted worsening abd/pelvic pain.   She is also attempting to self-wean her Norco 10mg Q6H PRN. She is now taking Norco 5mg Q6H PRN. Self weaning started 4-5 days ago   She is concerned about running out of narcotics and Dr. Zurita not willing to prescribe narcotics. I have explained to her I can not prescribe narcotics as her previous scans have shown no evidence of recurrence/metastasis.   Next CT on Saturday, Jan 13, 2024  Anxiety/Depression due to her dog's recent death.    Unsuccessful with smoking cessation due to stress    ROS:  Review of Systems   Constitutional:  Positive for appetite change (Decreased due to abd cramps) and fatigue. Negative for activity change and unexpected weight change.   HENT:  Negative for hearing loss, sore throat, trouble swallowing and voice change.    Eyes:  Negative for visual disturbance.   Respiratory:  Negative for cough, chest tightness, shortness of breath and wheezing.    Cardiovascular:  Negative for chest pain, palpitations and leg swelling.   Gastrointestinal:  Positive for abdominal pain (Intermittent lower abd cramps). Negative for blood in stool, nausea, rectal pain and vomiting.   Genitourinary:  Negative for bladder incontinence, difficulty urinating, flank pain, hematuria, nocturia, pelvic pain and urgency.   Musculoskeletal:  Positive for arthralgias, leg pain (Right ankle/foot) and myalgias. Negative for neck pain.   Integumentary:  Positive for wound (Right leg/ankle- surgical site).   Allergic/Immunologic: Negative for environmental allergies, food allergies and immunocompromised state.   Neurological:  Positive for weakness (Right leg post fx/fixation). Negative for dizziness, tremors, numbness, headaches, memory loss  and coordination difficulties.   Hematological:  Negative for adenopathy. Does not bruise/bleed easily.   Psychiatric/Behavioral:  Positive for dysphoric mood (Due to decreased mobility following R ankle fx) and sleep disturbance. Negative for agitation, behavioral problems, confusion, decreased concentration, self-injury and suicidal ideas. The patient is nervous/anxious (exacerbated by recent r ankle fx).        Review of Symptoms      Symptom Assessment (ESAS 0-10 Scale)  Pain:  8  Dyspnea:  0  Anxiety:  8  Nausea:  0  Depression:  7  Anorexia:  5  Fatigue:  6  Insomnia:  8  Restlessness:  8  Agitation:  8     CAM / Delirium:  Negative  Constipation:  Negative  Diarrhea:  Negative    Anxiety:  Is nervous/anxious (exacerbated by recent r ankle fx)    Bowel Management Plan (BMP):  No      Pain Assessment:    Location(s): abdomen and ankle    Abdomen       Location: anterior        Quality: Cramping        Quantity: 6/10 in intensity        Chronicity: Onset 1 year(s) ago, unchanged        Aggravating Factors: Movement        Alleviating Factors: Acetaminophen        Associated Symptoms: Myalgias  Ankle       Location: right       Quality: Aching and pressure-like        Quantity: 8/10 in intensity        Chronicity: Onset 1 month(s) ago, gradually worsening since Right ankle fracture on May 16th, 2024        Aggravating Factors: Pressure and movement        Alleviating Factors: Opiates        Associated Symptoms: Myalgias and arthralgias    Modified Osmar Scale:  0    Performance Status:  90    ECOG Performance Status ndGndrndanddndend:nd nd2nd Living Arrangements:  Lives with spouse    Psychosocial/Cultural:   See Palliative Psychosocial Note: Yes  Lives with her significant other of 16 years: they have 3 children combined, 2 sons from previous marriage.    **Primary  to Follow**  Palliative Care  Consult: Yes            Medications:    Current Outpatient Medications:     albuterol (PROVENTIL/VENTOLIN  HFA) 90 mcg/actuation inhaler, Inhale 2 puffs into the lungs every 4 (four) hours as needed for Wheezing or Shortness of Breath., Disp: 18 g, Rfl: 11    atorvastatin (LIPITOR) 40 MG tablet, Take 1 tablet (40 mg total) by mouth once daily., Disp: 90 tablet, Rfl: 3    calcium carbonate 400 mg calcium (1,000 mg) Chew, Take 2,000 mg by mouth once daily., Disp: , Rfl:     DULoxetine (CYMBALTA) 60 MG capsule, Take 60 mg by mouth., Disp: , Rfl:     ergocalciferol (ERGOCALCIFEROL) 50,000 unit Cap, Take 1 capsule (50,000 Units total) by mouth every 7 days., Disp: 12 capsule, Rfl: 2    ergocalciferol (VITAMIN D2) 50,000 unit Cap, Take 5,000 Units by mouth once daily at 6am. , Disp: , Rfl:     folic acid (FOLVITE) 1 MG tablet, Take 1 tablet (1 mg total) by mouth once daily., Disp: 100 tablet, Rfl: 3    hydrOXYzine HCL (ATARAX) 25 MG tablet, Take 1 tablet (25 mg total) by mouth 3 (three) times daily as needed for Itching., Disp: 90 tablet, Rfl: 1    ipratropium (ATROVENT) 42 mcg (0.06 %) nasal spray, 2 sprays by Nasal route 4 (four) times daily. As needed for rhinitis. Take in evening before bed and in the morning, Disp: 15 mL, Rfl: 11    letrozole (FEMARA) 2.5 mg Tab, Take 1 tablet (2.5 mg total) by mouth once daily., Disp: 30 tablet, Rfl: 2    levothyroxine (SYNTHROID) 300 MCG Tab, Take 1 tablet (300 mcg total) by mouth before breakfast., Disp: 30 tablet, Rfl: 11    multivitamin (THERAGRAN) per tablet, Take 1 tablet by mouth once daily., Disp: , Rfl:     naloxone (NARCAN) 4 mg/actuation Spry, SMARTSIG:Both Nares, Disp: , Rfl:     palbociclib (IBRANCE) 125 mg Cap, Take one capsule (125 mg) by mouth once daily on days 1-21 of each 28-day cycle., Disp: 21 capsule, Rfl: 11    pentoxifylline (TRENTAL) 400 mg TbSR, Take 1 tablet (400 mg total) by mouth 2 (two) times daily with meals., Disp: 60 tablet, Rfl: 11    potassium chloride SA (K-DUR,KLOR-CON) 20 MEQ tablet, Take 1 tablet (20 mEq total) by mouth once daily. Take daily for 3  "days., Disp: 3 tablet, Rfl: 1    vitamin E 1000 UNIT capsule, Take 1 capsule (1,000 Units total) by mouth once daily., Disp: 30 capsule, Rfl: 11    ALPRAZolam (XANAX) 0.5 MG tablet, Take 1 tablet (0.5 mg total) by mouth 2 (two) times daily as needed for Anxiety., Disp: 60 tablet, Rfl: 0    clopidogreL (PLAVIX) 75 mg tablet, Take 1 tablet (75 mg total) by mouth once daily., Disp: 30 tablet, Rfl: 11      External  database queried on 07/23/2024  by SUZIE REID :        Review of patient's allergies indicates:   Allergen Reactions    Gabapentin Swelling     Note: unilateral joint edema, unclear if due to gabapentin    Nsaids (non-steroidal anti-inflammatory drug) Other (See Comments)     Instructed not to take NSAID drugs with chemo pill.    Zometa [zoledronic acid] Other (See Comments)     Possible osteonecrosis jaw    Clindamycin Rash    Vancomycin analogues Itching           OBJECTIVE:         Physical Exam:        7/17/2024    11:10 AM 7/17/2024    12:04 PM 7/23/2024    10:34 AM   Vitals - 1 value per visit   SYSTOLIC 111 121 102   DIASTOLIC 68 77 69   Pulse 76 76 87   Temp 97.5 °F (36.4 °C) 97.4 °F (36.3 °C) 49.6 °F (9.8 °C)   Resp  18    SPO2 98 % 96 % 98 %   Height 5' 7" (1.702 m)  5' 7" (1.702 m)   Pain Score Seven Seven Eight      Repeat Temp: 97.8F Temporal taken 7/23/2024 @ 1100    Physical Exam  Vitals and nursing note reviewed.   Constitutional:       General: She is not in acute distress.     Appearance: Normal appearance. She is ill-appearing.   HENT:      Head: Normocephalic and atraumatic.      Nose: Nose normal. No congestion or rhinorrhea.      Mouth/Throat:      Mouth: Mucous membranes are moist.      Pharynx: Oropharynx is clear. No oropharyngeal exudate or posterior oropharyngeal erythema.   Eyes:      General: No scleral icterus.        Right eye: No discharge.         Left eye: No discharge.      Conjunctiva/sclera: Conjunctivae normal.      Pupils: Pupils are equal, round, " and reactive to light.   Cardiovascular:      Rate and Rhythm: Normal rate and regular rhythm.      Pulses: Normal pulses.      Heart sounds: Normal heart sounds. No murmur heard.     No gallop.   Pulmonary:      Effort: Pulmonary effort is normal. No respiratory distress.      Breath sounds: Normal breath sounds. No stridor. No wheezing.   Chest:      Chest wall: No tenderness.   Abdominal:      General: Bowel sounds are normal. There is no distension.      Palpations: Abdomen is soft.      Tenderness: There is no abdominal tenderness.   Genitourinary:     Comments: Deferred  Musculoskeletal:         General: Swelling, tenderness and signs of injury (Right leg) present.      Cervical back: Normal range of motion and neck supple.      Right lower leg: No edema.      Left lower leg: No edema.      Right ankle: Tenderness present. Decreased range of motion.        Legs:       Comments: Right leg surgical site. Incision CDI. Color intact. Pt able to move toes without difficulty   Skin:     General: Skin is warm and dry.      Capillary Refill: Capillary refill takes less than 2 seconds.      Coloration: Skin is not jaundiced or pale.      Findings: No rash.   Neurological:      Mental Status: She is alert and oriented to person, place, and time.      Motor: Weakness (Right leg.) present.      Gait: Gait abnormal (Right leg injury. Sitting in wheelchair. Ambulates with walker at home.).   Psychiatric:         Attention and Perception: Attention and perception normal.         Mood and Affect: Mood normal.         Speech: Speech normal.         Behavior: Behavior normal. Behavior is cooperative.         Thought Content: Thought content normal.         Cognition and Memory: Cognition and memory normal.         Judgment: Judgment normal.           Labs: [facility]  Lab Results   Component Value Date     07/17/2024    K 4.1 07/17/2024     07/17/2024    CO2 22 (L) 07/17/2024    BUN 13 07/17/2024    CREATININE 1.5 (H)  07/17/2024    CALCIUM 9.6 07/17/2024    ANIONGAP 9 07/17/2024    EGFRNORACEVR 41 (A) 07/17/2024     Lab Results   Component Value Date    WBC 7.90 07/17/2024    RBC 3.92 (L) 07/17/2024    HGB 13.3 07/17/2024    HCT 39.7 07/17/2024     (H) 07/17/2024    MCH 33.9 (H) 07/17/2024    MCHC 33.5 07/17/2024    RDW 14.3 07/17/2024     07/17/2024    MPV 12.1 07/17/2024    GRAN 4.8 07/17/2024    GRAN 60.3 07/17/2024    LYMPH 2.1 07/17/2024    LYMPH 27.1 07/17/2024    MONO 0.7 07/17/2024    MONO 8.4 07/17/2024    EOS 0.2 07/17/2024    BASO 0.06 07/17/2024    EOSINOPHIL 2.4 07/17/2024    BASOPHIL 0.8 07/17/2024          Imaging:   X-ray ABD: 7/16/2024: FINDINGS:  Bone structures appear normal for patient age.  No abnormal calcifications are visible.  There are surgical clips in the right upper quadrant associated with prior cholecystectomy and 2 additional indeterminate sutures projecting in the mid pelvis.  No bowel distention, free air or abnormal mass effect is identified.  Retained stool is not significantly increased and is visible in the ascending and proximal transverse segments.  The lung bases are clear.  Impression:   Radiographically benign abdomen.  A small amount of retained stool is visible in the ascending and proximal transverse segments of the colon.       CT A/P: 05/02/2024: Localized mildly dilated proximal jejunal bowel loops with more normal-caliber   mid to distal small bowel and colon. Findings raise suspicion for proximal   partial small-bowel obstruction versus localized ileus/gastroenteritis.   No evidence of metastatic disease within the abdomen or pelvis.   Stable low-density fatty liver changes.   Stable mixed lytic and sclerotic lesions within the pelvis and T9 marrow space   likely representing treated metastatic disease. No new osseous lesions   identified.      I spent a total of 35 minutes on the day of the visit. This includes face to face time in discussion of goals of care,  symptom assessment, coordination of care and emotional support.  This also includes non-face to face time preparing to see the patient (eg, review of tests/imaging), obtaining and/or reviewing separately obtained history, documenting clinical information in the electronic or other health record, independently interpreting results and communicating results to the patient/family/caregiver, or care coordinator.         SUZIE GOTTI, NP

## 2024-07-23 NOTE — ASSESSMENT & PLAN NOTE
Anxiety/Depression  Related to Cancer diagnosis and financial stress.   Xanax 0.5mg BID PRN   Continue Duloxetine 60mg daily. Crcl 69.5ml/min  Pt declined mental health counseling referral at this time  SW consult to assist with financial resources.

## 2024-07-25 RX ORDER — HYDROCODONE BITARTRATE AND ACETAMINOPHEN 10; 325 MG/1; MG/1
1 TABLET ORAL EVERY 6 HOURS PRN
Qty: 120 TABLET | Refills: 0 | Status: SHIPPED | OUTPATIENT
Start: 2024-07-25 | End: 2024-08-24

## 2024-07-30 NOTE — ASSESSMENT & PLAN NOTE
-Code status: DNR w/Selective treatment. LaPOST 01/6/2022.   -HCPOA: Sister: David Shuff: [phone]   -GOC:  Continue cancer treatment, symptom management, maintain independence and functional status.   -See past visit notes for further details

## 2024-07-30 NOTE — ASSESSMENT & PLAN NOTE
Neoplasm related pain  Uncontrolled- Due to right ankle/foot fracture.   -Norco 10mg TID PRN  -Pt followed by Dr. Prince at  Orthoped[facility].   -Post right ankle fixation on June 13, 2024.   -Plans to wean Norco back to 5mg TID PRN once right ankle/foot fracture pain improves.   -Patient instructed to contact me if 3 day supply of meds left.   -Narcan RX given and explained use to pt and family. Pt and family verbalized understanding.    -Pain Contract-4/18/2023  -UDS- Collected 7/23/ 2024- Results pending

## 2024-07-30 NOTE — ASSESSMENT & PLAN NOTE
Colonoscopy Aug 05, 2024  Followed by Dr. Beckman- GI   X-ray AB: 07/04/2024:   FINDINGS:  Bone structures appear normal for patient age.  No abnormal calcifications are visible.  There are surgical clips in the right upper quadrant associated with prior cholecystectomy and 2 additional indeterminate sutures projecting in the mid pelvis.  No bowel distention, free air or abnormal mass effect is identified.  Retained stool is not significantly increased and is visible in the ascending and proximal transverse segments.  The lung bases are clear.  Impression:   Radiographically benign abdomen.  A small amount of retained stool is visible in the ascending and proximal transverse segments of the colon.

## 2024-07-31 ENCOUNTER — PATIENT MESSAGE (OUTPATIENT)
Dept: GASTROENTEROLOGY | Facility: CLINIC | Age: 54
End: 2024-07-31
Payer: MEDICAID

## 2024-08-05 ENCOUNTER — ANESTHESIA (OUTPATIENT)
Dept: ENDOSCOPY | Facility: HOSPITAL | Age: 54
End: 2024-08-05
Payer: MEDICAID

## 2024-08-05 ENCOUNTER — ANESTHESIA EVENT (OUTPATIENT)
Dept: ENDOSCOPY | Facility: HOSPITAL | Age: 54
End: 2024-08-05
Payer: MEDICAID

## 2024-08-05 ENCOUNTER — HOSPITAL ENCOUNTER (OUTPATIENT)
Facility: HOSPITAL | Age: 54
Discharge: HOME | End: 2024-08-05
Attending: INTERNAL MEDICINE | Admitting: INTERNAL MEDICINE
Payer: MEDICAID

## 2024-08-05 VITALS
WEIGHT: 225 LBS | DIASTOLIC BLOOD PRESSURE: 62 MMHG | OXYGEN SATURATION: 98 % | TEMPERATURE: 98 F | RESPIRATION RATE: 20 BRPM | SYSTOLIC BLOOD PRESSURE: 110 MMHG | HEIGHT: 67 IN | HEART RATE: 67 BPM | BODY MASS INDEX: 35.31 KG/M2

## 2024-08-05 DIAGNOSIS — R10.30 LOWER ABDOMINAL PAIN: Primary | ICD-10-CM

## 2024-08-05 PROCEDURE — 37000008 HC ANESTHESIA 1ST 15 MINUTES: Performed by: INTERNAL MEDICINE

## 2024-08-05 PROCEDURE — 25000003 PHARM REV CODE 250: Performed by: INTERNAL MEDICINE

## 2024-08-05 PROCEDURE — 25000003 PHARM REV CODE 250: Performed by: NURSE ANESTHETIST, CERTIFIED REGISTERED

## 2024-08-05 PROCEDURE — 88305 TISSUE EXAM BY PATHOLOGIST: CPT | Performed by: PATHOLOGY

## 2024-08-05 PROCEDURE — 63600175 PHARM REV CODE 636 W HCPCS: Performed by: NURSE ANESTHETIST, CERTIFIED REGISTERED

## 2024-08-05 PROCEDURE — 37000009 HC ANESTHESIA EA ADD 15 MINS: Performed by: INTERNAL MEDICINE

## 2024-08-05 PROCEDURE — 27201012 HC FORCEPS, HOT/COLD, DISP: Performed by: INTERNAL MEDICINE

## 2024-08-05 PROCEDURE — 45380 COLONOSCOPY AND BIOPSY: CPT | Mod: ,,, | Performed by: INTERNAL MEDICINE

## 2024-08-05 PROCEDURE — 45380 COLONOSCOPY AND BIOPSY: CPT | Performed by: INTERNAL MEDICINE

## 2024-08-05 RX ORDER — LIDOCAINE HYDROCHLORIDE 10 MG/ML
INJECTION, SOLUTION EPIDURAL; INFILTRATION; INTRACAUDAL; PERINEURAL
Status: DISCONTINUED | OUTPATIENT
Start: 2024-08-05 | End: 2024-08-05

## 2024-08-05 RX ORDER — DEXTROMETHORPHAN/PSEUDOEPHED 2.5-7.5/.8
DROPS ORAL
Status: COMPLETED | OUTPATIENT
Start: 2024-08-05 | End: 2024-08-05

## 2024-08-05 RX ORDER — PROPOFOL 10 MG/ML
VIAL (ML) INTRAVENOUS
Status: DISCONTINUED | OUTPATIENT
Start: 2024-08-05 | End: 2024-08-05

## 2024-08-05 RX ADMIN — PROPOFOL 100 MG: 10 INJECTION, EMULSION INTRAVENOUS at 12:08

## 2024-08-05 RX ADMIN — PROPOFOL 30 MG: 10 INJECTION, EMULSION INTRAVENOUS at 12:08

## 2024-08-05 RX ADMIN — LIDOCAINE HYDROCHLORIDE 50 MG: 10 SOLUTION INTRAVENOUS at 12:08

## 2024-08-05 RX ADMIN — SODIUM CHLORIDE, SODIUM LACTATE, POTASSIUM CHLORIDE, AND CALCIUM CHLORIDE: .6; .31; .03; .02 INJECTION, SOLUTION INTRAVENOUS at 12:08

## 2024-08-07 NOTE — PROGRESS NOTES
Medication: Breo Ellipta 200-25 MCG/ACT inhale   Last office visit date: 7/23/24  Medication Refill Protocol Failed.  Protocol approved due to: data identified in chart review.    Subjective:       Patient ID: Josey Flores is a 51 y.o. female.    Chief Complaint: Hospital Follow Up    Emergency room follow-up for pancytopenia.  She is followed by Oncology with history of thyroid cancer metastatic breast cancer to lymph nodes and bone vaginal and cervical cancer.  She is currently receiving  chemotherapy for cervical cancer.  Radiation is being considered.  She was in emergency room with lower abdominal pain that seems  related to the cervical vaginal cancer.  She is scheduled see Oncology in about 10 days emergency room lab with a white count of 3.41 hemoglobin 9.8 platelets of 121. Previous platelet count a 172 her oncologist was aware of pancytopenia    Review of Systems   Constitutional: Positive for fatigue. Negative for chills and fever.   HENT: Negative for congestion.    Respiratory: Positive for cough. Negative for chest tightness, shortness of breath and wheezing.    Cardiovascular: Negative for chest pain, palpitations and leg swelling.   Gastrointestinal: Positive for abdominal pain and diarrhea. Negative for abdominal distention, nausea and vomiting.   Neurological: Negative for dizziness, weakness, light-headedness and headaches.   Psychiatric/Behavioral:        Prescribed antidepressant by oncology       Objective:      Physical Exam  Constitutional:       Appearance: She is not diaphoretic.   Cardiovascular:      Rate and Rhythm: Normal rate and regular rhythm.      Heart sounds: No murmur heard.    No gallop.   Pulmonary:      Effort: Pulmonary effort is normal. No respiratory distress.      Breath sounds: No wheezing, rhonchi or rales.   Abdominal:      General: There is no distension.      Palpations: There is no mass.      Tenderness: There is abdominal tenderness. There is no guarding.      Comments: Low abdominal tenderness   Skin:     Findings: No bruising.   Neurological:      Mental Status: She is alert.         Admission on 02/26/2022, Discharged on 02/26/2022    Component Date Value Ref Range Status    Sodium 02/26/2022 138  136 - 145 mmol/L Final    Potassium 02/26/2022 4.2  3.5 - 5.1 mmol/L Final    Chloride 02/26/2022 105  95 - 110 mmol/L Final    CO2 02/26/2022 19 (A) 23 - 29 mmol/L Final    Glucose 02/26/2022 110  70 - 110 mg/dL Final    BUN 02/26/2022 14  6 - 20 mg/dL Final    Creatinine 02/26/2022 1.2  0.5 - 1.4 mg/dL Final    Calcium 02/26/2022 8.7  8.7 - 10.5 mg/dL Final    Total Protein 02/26/2022 6.9  6.0 - 8.4 g/dL Final    Albumin 02/26/2022 3.6  3.5 - 5.2 g/dL Final    Total Bilirubin 02/26/2022 0.3  0.1 - 1.0 mg/dL Final    Alkaline Phosphatase 02/26/2022 65  55 - 135 U/L Final    AST 02/26/2022 17  10 - 40 U/L Final    ALT 02/26/2022 20  10 - 44 U/L Final    Anion Gap 02/26/2022 14  8 - 16 mmol/L Final    eGFR if  02/26/2022 60  >60 mL/min/1.73 m^2 Final    eGFR if non African American 02/26/2022 52 (A) >60 mL/min/1.73 m^2 Final    Lactate (Lactic Acid) 02/26/2022 1.9  0.5 - 2.2 mmol/L Final    Specimen UA 02/26/2022 Urine, Clean Catch   Final    Color, UA 02/26/2022 Yellow  Yellow, Straw, [place] Final    Appearance, UA 02/26/2022 Clear  Clear Final    pH, UA 02/26/2022 8.0  5.0 - 8.0 Final    Specific Gravity, UA 02/26/2022 1.010  1.005 - 1.030 Final    Protein, UA 02/26/2022 Negative  Negative Final    Glucose, UA 02/26/2022 Negative  Negative Final    Ketones, UA 02/26/2022 Negative  Negative Final    Bilirubin (UA) 02/26/2022 Negative  Negative Final    Occult Blood UA 02/26/2022 Negative  Negative Final    Nitrite, UA 02/26/2022 Negative  Negative Final    Urobilinogen, UA 02/26/2022 1.0  <2.0 EU/dL Final    Leukocytes, UA 02/26/2022 1+ (A) Negative Final    WBC, UA 02/26/2022 2  0 - 5 /hpf Final    Bacteria 02/26/2022 Occasional  None-Occ /hpf Final    Microscopic Comment 02/26/2022 SEE COMMENT   Final    WBC 02/26/2022 3.41 (A) 3.90 - 12.70 K/uL Final    RBC 02/26/2022 2.85 (A) 4.00 - 5.40 M/uL  Final    Hemoglobin 02/26/2022 9.8 (A) 12.0 - 16.0 g/dL Final    Hematocrit 02/26/2022 29.0 (A) 37.0 - 48.5 % Final    MCV 02/26/2022 102 (A) 82 - 98 fL Final    MCH 02/26/2022 34.4 (A) 27.0 - 31.0 pg Final    MCHC 02/26/2022 33.8  32.0 - 36.0 g/dL Final    RDW 02/26/2022 15.8 (A) 11.5 - 14.5 % Final    Platelets 02/26/2022 121 (A) 150 - 450 K/uL Final    MPV 02/26/2022 11.7  9.2 - 12.9 fL Final    Immature Granulocytes 02/26/2022 0.9 (A) 0.0 - 0.5 % Final    Gran # (ANC) 02/26/2022 1.1 (A) 1.8 - 7.7 K/uL Final    Immature Grans (Abs) 02/26/2022 0.03  0.00 - 0.04 K/uL Final    Lymph # 02/26/2022 1.7  1.0 - 4.8 K/uL Final    Mono # 02/26/2022 0.4  0.3 - 1.0 K/uL Final    Eos # 02/26/2022 0.2  0.0 - 0.5 K/uL Final    Baso # 02/26/2022 0.01  0.00 - 0.20 K/uL Final    nRBC 02/26/2022 0  0 /100 WBC Final    Gran % 02/26/2022 31.3 (A) 38.0 - 73.0 % Final    Lymph % 02/26/2022 48.7 (A) 18.0 - 48.0 % Final    Mono % 02/26/2022 12.6  4.0 - 15.0 % Final    Eosinophil % 02/26/2022 6.2  0.0 - 8.0 % Final    Basophil % 02/26/2022 0.3  0.0 - 1.9 % Final    Platelet Estimate 02/26/2022 Decreased (A)  Final    Aniso 02/26/2022 Slight   Final    Ovalocytes 02/26/2022 Occasional   Final    Differential Method 02/26/2022 Automated   Final     Assessment:       1. Pancytopenia    2. Primary malignant neoplasm of breast with metastasis to other site, unspecified laterality    3. Papillary thyroid carcinoma    4. Malignant neoplasm of endocervix    5. Lower abdominal pain        Plan:     Pancytopenia probably chemotherapy related.  Will repeat CBC in 1 week with Oncology follow-up in 10 days.    Pancytopenia  -     CBC Auto Differential; Future; Expected date: 03/08/2022    Primary malignant neoplasm of breast with metastasis to other site, unspecified laterality    Papillary thyroid carcinoma    Malignant neoplasm of endocervix    Lower abdominal pain

## 2024-08-08 LAB
FINAL PATHOLOGIC DIAGNOSIS: NORMAL
GROSS: NORMAL
Lab: NORMAL

## 2024-08-09 ENCOUNTER — TELEPHONE (OUTPATIENT)
Dept: OTOLARYNGOLOGY | Facility: CLINIC | Age: 54
End: 2024-08-09
Payer: MEDICAID

## 2024-08-09 ENCOUNTER — PATIENT MESSAGE (OUTPATIENT)
Dept: OTOLARYNGOLOGY | Facility: CLINIC | Age: 54
End: 2024-08-09
Payer: MEDICAID

## 2024-08-14 ENCOUNTER — HOSPITAL ENCOUNTER (OUTPATIENT)
Dept: RADIOLOGY | Facility: HOSPITAL | Age: 54
Discharge: HOME | End: 2024-08-14
Attending: INTERNAL MEDICINE
Payer: MEDICAID

## 2024-08-14 DIAGNOSIS — C77.3 MALIGNANT NEOPLASM METASTATIC TO LYMPH NODE OF AXILLA: ICD-10-CM

## 2024-08-14 PROCEDURE — 25500020 PHARM REV CODE 255: Performed by: INTERNAL MEDICINE

## 2024-08-14 PROCEDURE — A9698 NON-RAD CONTRAST MATERIALNOC: HCPCS | Performed by: INTERNAL MEDICINE

## 2024-08-14 PROCEDURE — 71260 CT THORAX DX C+: CPT | Mod: TC

## 2024-08-14 PROCEDURE — 74177 CT ABD & PELVIS W/CONTRAST: CPT | Mod: 26,,, | Performed by: RADIOLOGY

## 2024-08-14 PROCEDURE — 71260 CT THORAX DX C+: CPT | Mod: 26,,, | Performed by: RADIOLOGY

## 2024-08-14 PROCEDURE — 74177 CT ABD & PELVIS W/CONTRAST: CPT | Mod: TC

## 2024-08-14 RX ADMIN — IOHEXOL 100 ML: 350 INJECTION, SOLUTION INTRAVENOUS at 01:08

## 2024-08-14 RX ADMIN — IOHEXOL 1000 ML: 12 SOLUTION ORAL at 12:08

## 2024-08-15 ENCOUNTER — LAB VISIT (OUTPATIENT)
Dept: LAB | Facility: HOSPITAL | Age: 54
End: 2024-08-15
Attending: INTERNAL MEDICINE
Payer: MEDICAID

## 2024-08-15 ENCOUNTER — OFFICE VISIT (OUTPATIENT)
Dept: PALLIATIVE MEDICINE | Facility: CLINIC | Age: 54
End: 2024-08-15
Payer: MEDICAID

## 2024-08-15 ENCOUNTER — OFFICE VISIT (OUTPATIENT)
Dept: HEMATOLOGY/ONCOLOGY | Facility: CLINIC | Age: 54
End: 2024-08-15
Payer: MEDICAID

## 2024-08-15 ENCOUNTER — INFUSION (OUTPATIENT)
Dept: INFUSION THERAPY | Facility: HOSPITAL | Age: 54
End: 2024-08-15
Attending: INTERNAL MEDICINE
Payer: MEDICAID

## 2024-08-15 VITALS
WEIGHT: 220 LBS | RESPIRATION RATE: 18 BRPM | OXYGEN SATURATION: 97 % | BODY MASS INDEX: 34.53 KG/M2 | HEART RATE: 85 BPM | TEMPERATURE: 98 F | SYSTOLIC BLOOD PRESSURE: 120 MMHG | DIASTOLIC BLOOD PRESSURE: 71 MMHG | HEIGHT: 67 IN

## 2024-08-15 VITALS
HEART RATE: 85 BPM | HEIGHT: 67 IN | BODY MASS INDEX: 35.24 KG/M2 | OXYGEN SATURATION: 97 % | DIASTOLIC BLOOD PRESSURE: 71 MMHG | SYSTOLIC BLOOD PRESSURE: 102 MMHG | TEMPERATURE: 98 F

## 2024-08-15 DIAGNOSIS — C79.51 SECONDARY CANCER OF BONE: ICD-10-CM

## 2024-08-15 DIAGNOSIS — F32.A ANXIETY AND DEPRESSION: ICD-10-CM

## 2024-08-15 DIAGNOSIS — C50.919 PRIMARY MALIGNANT NEOPLASM OF BREAST WITH METASTASIS TO OTHER SITE, UNSPECIFIED LATERALITY: ICD-10-CM

## 2024-08-15 DIAGNOSIS — C50.811 MALIGNANT NEOPLASM OF OVERLAPPING SITES OF RIGHT BREAST IN FEMALE, ESTROGEN RECEPTOR POSITIVE: Primary | ICD-10-CM

## 2024-08-15 DIAGNOSIS — Z17.0 MALIGNANT NEOPLASM OF OVERLAPPING SITES OF RIGHT BREAST IN FEMALE, ESTROGEN RECEPTOR POSITIVE: Primary | ICD-10-CM

## 2024-08-15 DIAGNOSIS — R91.8 LUNG NODULES: ICD-10-CM

## 2024-08-15 DIAGNOSIS — C50.811 MALIGNANT NEOPLASM OF OVERLAPPING SITES OF RIGHT BREAST IN FEMALE, ESTROGEN RECEPTOR POSITIVE: ICD-10-CM

## 2024-08-15 DIAGNOSIS — C73 PAPILLARY THYROID CARCINOMA: ICD-10-CM

## 2024-08-15 DIAGNOSIS — S82.891D CLOSED FRACTURE OF RIGHT ANKLE WITH ROUTINE HEALING, SUBSEQUENT ENCOUNTER: ICD-10-CM

## 2024-08-15 DIAGNOSIS — Z51.5 PALLIATIVE CARE ENCOUNTER: ICD-10-CM

## 2024-08-15 DIAGNOSIS — Z17.0 MALIGNANT NEOPLASM OF OVERLAPPING SITES OF RIGHT BREAST IN FEMALE, ESTROGEN RECEPTOR POSITIVE: ICD-10-CM

## 2024-08-15 DIAGNOSIS — F41.9 ANXIETY AND DEPRESSION: ICD-10-CM

## 2024-08-15 DIAGNOSIS — G89.3 NEOPLASM RELATED PAIN: ICD-10-CM

## 2024-08-15 DIAGNOSIS — C77.3 MALIGNANT NEOPLASM METASTATIC TO LYMPH NODE OF AXILLA: ICD-10-CM

## 2024-08-15 DIAGNOSIS — S92.901A UNSPECIFIED FRACTURE OF RIGHT FOOT, INITIAL ENCOUNTER FOR CLOSED FRACTURE: ICD-10-CM

## 2024-08-15 DIAGNOSIS — C77.3 MALIGNANT NEOPLASM METASTATIC TO LYMPH NODE OF AXILLA: Primary | ICD-10-CM

## 2024-08-15 DIAGNOSIS — C53.0 MALIGNANT NEOPLASM OF ENDOCERVIX: ICD-10-CM

## 2024-08-15 LAB
ALBUMIN SERPL BCP-MCNC: 3.7 G/DL (ref 3.5–5.2)
ALP SERPL-CCNC: 86 U/L (ref 55–135)
ALT SERPL W/O P-5'-P-CCNC: 21 U/L (ref 10–44)
ANION GAP SERPL CALC-SCNC: 11 MMOL/L (ref 8–16)
AST SERPL-CCNC: 17 U/L (ref 10–40)
BASOPHILS # BLD AUTO: 0.06 K/UL (ref 0–0.2)
BASOPHILS NFR BLD: 0.8 % (ref 0–1.9)
BILIRUB SERPL-MCNC: 0.3 MG/DL (ref 0.1–1)
BUN SERPL-MCNC: 12 MG/DL (ref 6–20)
CALCIUM SERPL-MCNC: 9.5 MG/DL (ref 8.7–10.5)
CHLORIDE SERPL-SCNC: 103 MMOL/L (ref 95–110)
CO2 SERPL-SCNC: 26 MMOL/L (ref 23–29)
CREAT SERPL-MCNC: 1.5 MG/DL (ref 0.5–1.4)
DIFFERENTIAL METHOD BLD: ABNORMAL
EOSINOPHIL # BLD AUTO: 0.2 K/UL (ref 0–0.5)
EOSINOPHIL NFR BLD: 2.7 % (ref 0–8)
ERYTHROCYTE [DISTWIDTH] IN BLOOD BY AUTOMATED COUNT: 14.6 % (ref 11.5–14.5)
EST. GFR  (NO RACE VARIABLE): 41 ML/MIN/1.73 M^2
GLUCOSE SERPL-MCNC: 120 MG/DL (ref 70–110)
HCT VFR BLD AUTO: 37.6 % (ref 37–48.5)
HGB BLD-MCNC: 12.4 G/DL (ref 12–16)
IMM GRANULOCYTES # BLD AUTO: 0.14 K/UL (ref 0–0.04)
IMM GRANULOCYTES NFR BLD AUTO: 1.8 % (ref 0–0.5)
LYMPHOCYTES # BLD AUTO: 2.3 K/UL (ref 1–4.8)
LYMPHOCYTES NFR BLD: 29.8 % (ref 18–48)
MAGNESIUM SERPL-MCNC: 2 MG/DL (ref 1.6–2.6)
MCH RBC QN AUTO: 33.8 PG (ref 27–31)
MCHC RBC AUTO-ENTMCNC: 33 G/DL (ref 32–36)
MCV RBC AUTO: 103 FL (ref 82–98)
MONOCYTES # BLD AUTO: 0.6 K/UL (ref 0.3–1)
MONOCYTES NFR BLD: 8 % (ref 4–15)
NEUTROPHILS # BLD AUTO: 4.5 K/UL (ref 1.8–7.7)
NEUTROPHILS NFR BLD: 56.9 % (ref 38–73)
NRBC BLD-RTO: 0 /100 WBC
PHOSPHATE SERPL-MCNC: 3.6 MG/DL (ref 2.7–4.5)
PLATELET # BLD AUTO: 196 K/UL (ref 150–450)
PMV BLD AUTO: 11.6 FL (ref 9.2–12.9)
POTASSIUM SERPL-SCNC: 3.7 MMOL/L (ref 3.5–5.1)
PROT SERPL-MCNC: 7 G/DL (ref 6–8.4)
RBC # BLD AUTO: 3.67 M/UL (ref 4–5.4)
SODIUM SERPL-SCNC: 140 MMOL/L (ref 136–145)
WBC # BLD AUTO: 7.85 K/UL (ref 3.9–12.7)

## 2024-08-15 PROCEDURE — 80053 COMPREHEN METABOLIC PANEL: CPT | Performed by: INTERNAL MEDICINE

## 2024-08-15 PROCEDURE — 83735 ASSAY OF MAGNESIUM: CPT | Performed by: INTERNAL MEDICINE

## 2024-08-15 PROCEDURE — 1159F MED LIST DOCD IN RCRD: CPT | Mod: CPTII,,,

## 2024-08-15 PROCEDURE — 99214 OFFICE O/P EST MOD 30 MIN: CPT | Mod: S$PBB,,,

## 2024-08-15 PROCEDURE — 85025 COMPLETE CBC W/AUTO DIFF WBC: CPT | Performed by: INTERNAL MEDICINE

## 2024-08-15 PROCEDURE — 99999 PR PBB SHADOW E&M-EST. PATIENT-LVL V: CPT | Mod: PBBFAC,,,

## 2024-08-15 PROCEDURE — 3074F SYST BP LT 130 MM HG: CPT | Mod: CPTII,,,

## 2024-08-15 PROCEDURE — 99214 OFFICE O/P EST MOD 30 MIN: CPT | Mod: PBBFAC,25 | Performed by: INTERNAL MEDICINE

## 2024-08-15 PROCEDURE — 3078F DIAST BP <80 MM HG: CPT | Mod: CPTII,,,

## 2024-08-15 PROCEDURE — 99999 PR PBB SHADOW E&M-EST. PATIENT-LVL IV: CPT | Mod: PBBFAC,,, | Performed by: INTERNAL MEDICINE

## 2024-08-15 PROCEDURE — 96523 IRRIG DRUG DELIVERY DEVICE: CPT

## 2024-08-15 PROCEDURE — 3008F BODY MASS INDEX DOCD: CPT | Mod: CPTII,,,

## 2024-08-15 PROCEDURE — A4216 STERILE WATER/SALINE, 10 ML: HCPCS

## 2024-08-15 PROCEDURE — 36415 COLL VENOUS BLD VENIPUNCTURE: CPT | Performed by: INTERNAL MEDICINE

## 2024-08-15 PROCEDURE — 99215 OFFICE O/P EST HI 40 MIN: CPT | Mod: PBBFAC,25,27

## 2024-08-15 PROCEDURE — 25000003 PHARM REV CODE 250

## 2024-08-15 PROCEDURE — 84100 ASSAY OF PHOSPHORUS: CPT | Performed by: INTERNAL MEDICINE

## 2024-08-15 PROCEDURE — 63600175 PHARM REV CODE 636 W HCPCS

## 2024-08-15 RX ORDER — SODIUM CHLORIDE 0.9 % (FLUSH) 0.9 %
10 SYRINGE (ML) INJECTION
Status: DISCONTINUED | OUTPATIENT
Start: 2024-08-15 | End: 2024-08-15 | Stop reason: HOSPADM

## 2024-08-15 RX ORDER — SODIUM CHLORIDE 0.9 % (FLUSH) 0.9 %
10 SYRINGE (ML) INJECTION
OUTPATIENT
Start: 2024-08-15

## 2024-08-15 RX ORDER — HEPARIN 100 UNIT/ML
500 SYRINGE INTRAVENOUS
Status: DISCONTINUED | OUTPATIENT
Start: 2024-08-15 | End: 2024-08-15 | Stop reason: HOSPADM

## 2024-08-15 RX ORDER — HEPARIN 100 UNIT/ML
500 SYRINGE INTRAVENOUS
OUTPATIENT
Start: 2024-08-15

## 2024-08-15 RX ADMIN — SODIUM CHLORIDE, PRESERVATIVE FREE 10 ML: 5 INJECTION INTRAVENOUS at 09:08

## 2024-08-15 RX ADMIN — HEPARIN 500 UNITS: 100 SYRINGE at 09:08

## 2024-08-15 NOTE — PATIENT INSTRUCTIONS
Continue taking your pain medications as prescribed  Only take Xanax 0.5mg twice a day. We plan to wean down your doses   Let us know when you are ready to see a therapist.

## 2024-08-15 NOTE — PROGRESS NOTES
[facility]  [address] [facility] [address]  Phone: [phone];  Fax: [phone]    Patient ID: Josey Flores   Chief Complaint: Follow-up  MRN:  [id number]     Oncologic Diagnosis:    - Stage IV cervical squamous cell carcinoma  - Stage IV, T2 N1b M1, invasive breast carcinoma, ER/GA+, HER2-  - Papillary thyroid carcinoma status post total thyroidectomy on 08/31/2017, mpT1b N0   - Cervical HSIL MIREILLE 3 s/p LEEP, 2017  Previous Treatment:    - Palliative chemotherapy: cisplatin, paclitaxel and bevacizumab; 02/22/2021 cycle 1 day 1 -> bevacizumab maintenance after 6 cycles  - Carboplatin, paclitaxel and pembrolizumab, C1D1 1/28/22; maintenance pembrolizumab, d/c 9/2022  Current Treatment:    - Letrozole and palbociclib   Subjective   Josey Flores is a 53 y.o. female who presents to clinic for follow up and is accompanied by her spouse.      The patient reports that she is compliant with levothyroxine.  She continues to have the below documented ROS symptoms and her chronic symptoms remain unchanged.  She has re-established care with palliative care.  Her cough has improved and she is following up with gastroenterology regarding her abdominal pain.  She has had surgery on her right limb.  We will continue with current treatment plan.    Counseled on smoking cessation.       Review of Systems   Constitutional:  Positive for appetite change (decreased). Negative for activity change, chills, diaphoresis, fatigue, fever and unexpected weight change.   HENT:  Negative for nosebleeds.    Respiratory:  Negative for cough and shortness of breath.    Cardiovascular:  Negative for chest pain.   Gastrointestinal:  Positive for abdominal pain and diarrhea. Negative for abdominal distention, anal bleeding, blood in stool, constipation, nausea and vomiting.   Genitourinary:  Negative for difficulty urinating and hematuria.   Musculoskeletal:  Positive for arthralgias. Negative  for back pain and myalgias.   Skin:  Negative for rash.   Neurological:  Negative for dizziness, weakness, light-headedness and headaches.   Hematological:  Does not bruise/bleed easily.   Psychiatric/Behavioral:  Positive for sleep disturbance (due to pain). The patient is not nervous/anxious.      History     Oncology History   Secondary cancer of bone    Chemotherapy    Treatment Summary   Plan Name: OP GYN PACLITAXEL CISPLATIN BEVACIZUMAB Q3W  Treatment Goal: Control  Status: Inactive  Start Date: 2/22/2021  End Date: 7/7/2021  Provider: Estefani Asencio MD  Chemotherapy: CISplatin (PLATINOL) 50 mg/m2 = 108 mg in sodium chloride 0.9% 608 mL chemo infusion, 50 mg/m2 = 108 mg, Intravenous, Clinic/HOD 1 time, 6 of 6 cycles  Administration: 108 mg (2/22/2021), 108 mg (3/15/2021), 108 mg (4/6/2021), 108 mg (5/11/2021), 108 mg (6/1/2021), 108 mg (6/22/2021)    bevacizumab (AVASTIN) 15 mg/kg = 1,480 mg in sodium chloride 0.9% 100 mL chemo infusion, 15 mg/kg = 1,480 mg, Intravenous, Clinic/HOD 1 time, 6 of 6 cycles  Administration: 1,480 mg (4/6/2021), 1,480 mg (2/23/2021), 1,480 mg (3/16/2021), 1,480 mg (6/1/2021), 1,480 mg (6/22/2021), 1,480 mg (5/12/2021)    PACLitaxeL (TAXOL) 175 mg/m2 = 378 mg in sodium chloride 0.9% 563 mL chemo infusion, 175 mg/m2 = 378 mg, Intravenous, Clinic/HOD 1 time, 6 of 6 cycles  Administration: 378 mg (2/22/2021), 378 mg (3/15/2021), 378 mg (4/6/2021), 378 mg (5/11/2021), 378 mg (6/1/2021), 378 mg (6/22/2021)    Plan Name: OP BEVACIZUMAB Q3W   Treatment Goal: Maintenance  Status: Inactive  Start Date: 7/13/2021  End Date: 12/28/2021  Provider: Estefani Asencio MD  Chemotherapy: bevacizumab (AVASTIN) 1,300 mg in sodium chloride 0.9% 100 mL chemo infusion, 1,345 mg, Intravenous, [facility] [place] 1 time, 9 of 17 cycles  Administration: 1,300 mg (7/13/2021), 1,345 mg (8/3/2021), 1,300 mg (8/24/2021), 1,300 mg (9/14/2021), 1,345 mg (10/5/2021), 1,345 mg (10/26/2021), 1,345 mg (11/16/2021),  1,300 mg (12/7/2021), 1,300 mg (12/28/2021)    Plan Name: OP CARBOPLATIN PACLITAXEL PEMBROLIZUMAB Q3W FOLLOWED BY MAINTENANCE PEMBROLIZUMAB 400 MG Q6W  Treatment Goal: Control  Status: Inactive  Start Date: 1/28/2022  End Date: 8/11/2022  Provider: Estefani Asencio MD  Chemotherapy: CARBOplatin (PARAPLATIN) 425 mg in sodium chloride 0.9% 292.5 mL chemo infusion, 425 mg, Intravenous, Clinic/HOD 1 time, 6 of 6 cycles  Dose modification:   (original dose 424 mg, Cycle 6),   (original dose 424 mg, Cycle 6)  Administration: 425 mg (1/28/2022), 495 mg (2/18/2022), 525 mg (6/2/2022), 525 mg (5/12/2022), 525 mg (3/11/2022), 495 mg (4/11/2022)    PACLitaxeL (TAXOL) 175 mg/m2 = 366 mg in sodium chloride 0.9% 561 mL chemo infusion, 175 mg/m2 = 366 mg (100 % of original dose 175 mg/m2), Intravenous, Clinic/HOD 1 time, 6 of 6 cycles  Dose modification: 175 mg/m2 (original dose 175 mg/m2, Cycle 1), 175 mg/m2 (original dose 175 mg/m2, Cycle 4)  Administration: 366 mg (1/28/2022), 366 mg (2/18/2022), 366 mg (3/11/2022), 366 mg (4/11/2022), 366 mg (5/12/2022), 366 mg (6/2/2022)       Malignant neoplasm of endocervix   9/10/2020 Imaging Significant Findings    CT A/P: Negative for ascites, omental caking, lymphadenopathy, or a gross cervical mass     12/7/2020 Biopsy    EMBX: SCC, p16+     1/12/2021 Imaging Significant Findings    MRI Pelvis w/ w/o  4.9 cm cervical mass  R parametrial invasion  R external iliac lymphadenopathy     1/27/2021 Imaging Significant Findings    PET/CT: FDG avidity in the chest     2/18/2021 Biopsy    Endoscopic biopsy of the lung: +      2/22/2021 - 6/29/2021 Chemotherapy    Cisplatin/paclitaxel/avastin x6     3/12/2021 Genetic Testing    STRATA: FRANK, PIK3c, Ep300, ERBB3, KDM6A     4/24/2021 Imaging Significant Findings    CT Pelvis w/: JUAN FRANCISCO with a normal cervix     4/26/2021 Imaging Significant Findings    PET/CT: No FDG avidity     5/18/2021 Imaging Significant Findings    CT A/P w/: JUAN FRANCISCO     6/5/2021  Imaging Significant Findings    CT A/P w/: JUAN FRANCISCO     7/12/2021 Imaging Significant Findings    PET/CT: JUAN FRANCISCO     7/13/2021 - 12/28/2021 Maintenance Therapy    Avastin x 9     10/20/2021 Imaging Significant Findings    PET/CT: JUAN FRANCISCO     1/11/2022 Imaging Significant Findings    PET/CT: Increased FDG avidity in the cervix.  No other evidence of disease.     1/28/2022 - 6/2/2022 Chemotherapy    Carboplatin/paclitaxel/pembrolizumab x 6     3/25/2022 Imaging Significant Findings    PET/CT: Persistent FDG avidity in the cervix.  New FDG avidity in the inguinal lymph nodes (although it does not look suspicious)     4/25/2022 - 4/29/2022 Radiation Therapy    Treating physician: Dr. Vázquez (MRI guided T&O)  Total Dose: 30 Gy  Fractions: 5     6/30/2022 - 8/11/2022 Maintenance Therapy    Pembrolizumab 400 mg IV x2     8/5/2022 Imaging Significant Findings    PET/CT: JUAN FRANCISCO     10/16/2022 Imaging Significant Findings    CT A/P w/ (Women's without imaging available): JUAN FRANCISCO     1/11/2023 Imaging Significant Findings    PET/CT: JUAN FRANCISCO      Chemotherapy    Treatment Summary   Plan Name: OP GYN PACLITAXEL CISPLATIN BEVACIZUMAB Q3W  Treatment Goal: Control  Status: Inactive  Start Date: 2/22/2021  End Date: 7/7/2021  Provider: Estefani Asencio MD  Chemotherapy: CISplatin (PLATINOL) 50 mg/m2 = 108 mg in sodium chloride 0.9% 608 mL chemo infusion, 50 mg/m2 = 108 mg, Intravenous, Clinic/HOD 1 time, 6 of 6 cycles  Administration: 108 mg (2/22/2021), 108 mg (3/15/2021), 108 mg (4/6/2021), 108 mg (5/11/2021), 108 mg (6/1/2021), 108 mg (6/22/2021)    bevacizumab (AVASTIN) 15 mg/kg = 1,480 mg in sodium chloride 0.9% 100 mL chemo infusion, 15 mg/kg = 1,480 mg, Intravenous, Clinic/HOD 1 time, 6 of 6 cycles  Administration: 1,480 mg (4/6/2021), 1,480 mg (2/23/2021), 1,480 mg (3/16/2021), 1,480 mg (6/1/2021), 1,480 mg (6/22/2021), 1,480 mg (5/12/2021)    PACLitaxeL (TAXOL) 175 mg/m2 = 378 mg in sodium chloride 0.9% 563 mL chemo infusion, 175 mg/m2 = 378  mg, Intravenous, Clinic/HOD 1 time, 6 of 6 cycles  Administration: 378 mg (2/22/2021), 378 mg (3/15/2021), 378 mg (4/6/2021), 378 mg (5/11/2021), 378 mg (6/1/2021), 378 mg (6/22/2021)    Plan Name: OP BEVACIZUMAB Q3W   Treatment Goal: Maintenance  Status: Inactive  Start Date: 7/13/2021  End Date: 12/28/2021  Provider: Estefani Asencio MD  Chemotherapy: bevacizumab (AVASTIN) 1,300 mg in sodium chloride 0.9% 100 mL chemo infusion, 1,345 mg, Intravenous, Clinic/HOD 1 time, 9 of 17 cycles  Administration: 1,300 mg (7/13/2021), 1,345 mg (8/3/2021), 1,300 mg (8/24/2021), 1,300 mg (9/14/2021), 1,345 mg (10/5/2021), 1,345 mg (10/26/2021), 1,345 mg (11/16/2021), 1,300 mg (12/7/2021), 1,300 mg (12/28/2021)    Plan Name: OP CARBOPLATIN PACLITAXEL PEMBROLIZUMAB Q3W FOLLOWED BY MAINTENANCE PEMBROLIZUMAB 400 MG Q6W  Treatment Goal: Control  Status: Inactive  Start Date: 1/28/2022  End Date: 8/11/2022  Provider: Estefani Asencio MD  Chemotherapy: CARBOplatin (PARAPLATIN) 425 mg in sodium chloride 0.9% 292.5 mL chemo infusion, 425 mg, Intravenous, Clinic/HOD 1 time, 6 of 6 cycles  Dose modification:   (original dose 424 mg, Cycle 6),   (original dose 424 mg, Cycle 6)  Administration: 425 mg (1/28/2022), 495 mg (2/18/2022), 525 mg (6/2/2022), 525 mg (5/12/2022), 525 mg (3/11/2022), 495 mg (4/11/2022)    PACLitaxeL (TAXOL) 175 mg/m2 = 366 mg in sodium chloride 0.9% 561 mL chemo infusion, 175 mg/m2 = 366 mg (100 % of original dose 175 mg/m2), Intravenous, Clinic/HOD 1 time, 6 of 6 cycles  Dose modification: 175 mg/m2 (original dose 175 mg/m2, Cycle 1), 175 mg/m2 (original dose 175 mg/m2, Cycle 4)  Administration: 366 mg (1/28/2022), 366 mg (2/18/2022), 366 mg (3/11/2022), 366 mg (4/11/2022), 366 mg (5/12/2022), 366 mg (6/2/2022)             Past Medical History:   Diagnosis Date    Anxiety     Asthma     Breast cancer 2017    Cancer     right breast, metastatic-lymph nodes, bone, and thyroid     COPD (chronic obstructive  pulmonary disease)     Depression     History of psychiatric hospitalization     2000; suicidal ideation    Hx of psychiatric care     Hypothyroidism     Miscarriage     five miscarriages    Obesity     Psychiatric problem     Thyroid cancer 2017       Past Surgical History:   Procedure Laterality Date    ABSCESS DRAINAGE      bilateral axilla    ADENOIDECTOMY      APPENDECTOMY      BREAST BIOPSY Right     x3    CHOLECYSTECTOMY      COLONOSCOPY N/A 8/5/2024    Procedure: COLONOSCOPY cc/bt done on 7/22/24 Dr. Eckert;  Surgeon: Doreen Beckman MD;  Location: [facility] ENDO;  Service: Endoscopy;  Laterality: N/A;    ENDOBRONCHIAL ULTRASOUND Bilateral 02/18/2021    Procedure: ENDOBRONCHIAL ULTRASOUND (EBUS);  Surgeon: Jaron Ni MD;  Location: [facility] ENDO;  Service: Pulmonary;  Laterality: Bilateral;    FLUOROSCOPY N/A 01/28/2021    Procedure: Mediport placement;  Surgeon: Noah Phelan MD;  Location: [facility] CATH LAB;  Service: General;  Laterality: N/A;    MASS EXCISION      MEDIPORT INSERTION, SINGLE Right 02/2021    SKIN GRAFT  06/16/2017    THYROIDECTOMY Bilateral     TONSILLECTOMY         Family History   Problem Relation Name Age of Onset    Arthritis Mother Beatrice     Early death Mother Beatrice         64 at time of death    Heart attack Mother Beatrice     Heart disease Mother Beatrice     Heart failure Mother Beatrice     Hypertension Mother Beatrice     Coronary artery disease Father David     Hearing loss Father David     Heart disease Father David     Hyperlipidemia Father David     Hypertension Father David     Mental illness Father David     Learning disabilities Son Keyur     Cancer Maternal Aunt         Review of patient's allergies indicates:   Allergen Reactions    Gabapentin Swelling     Note: unilateral joint edema, unclear if due to gabapentin    Nsaids (non-steroidal anti-inflammatory drug) Other (See Comments)     Instructed not to take NSAID drugs with chemo pill.    Zometa  [zoledronic acid] Other (See Comments)     Possible osteonecrosis jaw    Clindamycin Rash    Vancomycin analogues Itching       Social History     Tobacco Use    Smoking status: Every Day     Current packs/day: 1.00     Average packs/day: 1 pack/day for 39.6 years (39.6 ttl pk-yrs)     Types: Cigarettes     Start date: 1/1/1985     Passive exposure: Current    Smokeless tobacco: Never    Tobacco comments:     45 pack year history   Substance Use Topics    Alcohol use: No    Drug use: No         Imaging     CT CHEST ABDOMEN PELVIS WITH IV CONTRAST (XPD) - 08/14/2024     CLINICAL INDICATION: Metastatic disease evaluation     TECHNIQUE: Axial and multiplanar 2-D reformations provided.  With IV contrast     PRIORS: April 2024     FINDINGS:  1.  Emphysema  2.  Right upper lobe pulmonary nodule (series 2/40) stable  3.  Right upper lobe calcified granuloma with right hilar, mediastinal and splenic calcifications all consistent with old granulomatous disease, stable  4.  Early coronary artery calcifications  5.  Hepatomegaly with probable fatty infiltration  6.  Cholecystectomy clips without complication  7.  Inferior left renal 18 mm hypodensity consistent with simple cyst, stable  8.  Generalized atherosclerosis without aneurysm  9.  No evidence for appendicitis  10.  Diffuse thoracolumbar spondylosis without vertebral body fractures nor focal bone abnormalities        Impression:     1.  I see no evidence to suggest metastatic disease in the chest, abdomen nor pelvis  2.  Emphysema is present; it is an independent risk factor for lung cancer. Recommend patient be evaluated for low dose cancer screening protocol.  3.  Stable right upper lobe pulmonary nodule as described above  4.  Coronary artery calcifications  5.  Fatty liver  6.  Left renal simple cyst (no further follow-up nor evaluation recommended for this isolated abnormality)     All CT scans at [this location] are performed using dose modulation techniques as  "appropriate to a performed exam including the following:  Automated exposure control; adjustment of the mA and/or kV according to patient size (this includes techniques or standardized protocols for targeted exams where dose is matched to indication / reason for exam; i.e. extremities or head); use of iterative reconstruction technique.    Physical Exam   ECOG:   ECOG SCORE    2 - Capable of all selfcare but unable to carry out any work activities, active > 50% of hours          Vitals:  /71   Pulse 85   Temp 97.6 °F (36.4 °C) (Temporal)   Ht 5' 7" (1.702 m)   LMP 12/05/2020 Comment: no cycle x3 years  SpO2 97%   BMI 35.24 kg/m²     Physical Exam:  Physical Exam  Constitutional:       General: She is not in acute distress.     Appearance: Normal appearance.   HENT:      Head: Normocephalic and atraumatic.   Eyes:      Extraocular Movements: Extraocular movements intact.      Conjunctiva/sclera: Conjunctivae normal.   Cardiovascular:      Rate and Rhythm: Normal rate.   Pulmonary:      Effort: Pulmonary effort is normal. No respiratory distress.   Abdominal:      General: There is no distension.      Palpations: Abdomen is soft. There is no hepatomegaly or splenomegaly.      Tenderness: There is no abdominal tenderness. There is no guarding.   Skin:     Findings: No rash.   Neurological:      General: No focal deficit present.      Mental Status: She is alert.   Psychiatric:         Mood and Affect: Mood normal.         Behavior: Behavior normal.         Thought Content: Thought content normal.          Labs   Labs:  Lab Visit on 08/15/2024   Component Date Value Ref Range Status    WBC 08/15/2024 7.85  3.90 - 12.70 K/uL Final    RBC 08/15/2024 3.67 (L)  4.00 - 5.40 M/uL Final    Hemoglobin 08/15/2024 12.4  12.0 - 16.0 g/dL Final    Hematocrit 08/15/2024 37.6  37.0 - 48.5 % Final    MCV 08/15/2024 103 (H)  82 - 98 fL Final    MCH 08/15/2024 33.8 (H)  27.0 - 31.0 pg Final    MCHC 08/15/2024 33.0  32.0 - " 36.0 g/dL Final    RDW 08/15/2024 14.6 (H)  11.5 - 14.5 % Final    Platelets 08/15/2024 196  150 - 450 K/uL Final    MPV 08/15/2024 11.6  9.2 - 12.9 fL Final    Immature Granulocytes 08/15/2024 1.8 (H)  0.0 - 0.5 % Final    Gran # (ANC) 08/15/2024 4.5  1.8 - 7.7 K/uL Final    Immature Grans (Abs) 08/15/2024 0.14 (H)  0.00 - 0.04 K/uL Final    Comment: Mild elevation in immature granulocytes is non specific and   can be seen in a variety of conditions including stress response,   acute inflammation, trauma and pregnancy. Correlation with other   laboratory and clinical findings is essential.      Lymph # 08/15/2024 2.3  1.0 - 4.8 K/uL Final    Mono # 08/15/2024 0.6  0.3 - 1.0 K/uL Final    Eos # 08/15/2024 0.2  0.0 - 0.5 K/uL Final    Baso # 08/15/2024 0.06  0.00 - 0.20 K/uL Final    nRBC 08/15/2024 0  0 /100 WBC Final    Gran % 08/15/2024 56.9  38.0 - 73.0 % Final    Lymph % 08/15/2024 29.8  18.0 - 48.0 % Final    Mono % 08/15/2024 8.0  4.0 - 15.0 % Final    Eosinophil % 08/15/2024 2.7  0.0 - 8.0 % Final    Basophil % 08/15/2024 0.8  0.0 - 1.9 % Final    Differential Method 08/15/2024 Automated   Final        Imaging   CT Chest Abdomen Without Contrast (XPD)  - 01/13/2024  Impression:  Stable exam.  7 mm right nodule, unchanged.  Calcified granuloma with calcified right hilar lymph nodes.  Fatty infiltration of the liver.  Status post cholecystectomy.         CT CHEST ABDOMEN PELVIS WITHOUT CONTRAST(XPD) - 04/15/24  COMPARISON:  01/13/2024     FINDINGS:  CT of chest without contrast:     The pulmonary window images demonstrate stable appearance of a calcified granuloma located laterally in the right upper lobe and a linear density more characteristic of localized pulmonary scarring also seen laterally in the right upper lobe (axial image 179).  Centrilobular emphysema is also visible in the upper lobes bilaterally, more severe on the right.  No pleural effusion or lung consolidation is appreciated.  No pathologic  lymph node enlargement is visible in the mediastinum or axilla bilaterally.  Dense lymph node calcification associated with granulomatous disease is again noted in the mediastinum and right hilum.  Heart size is normal.  No chest wall abnormalities are appreciated except for evidence of apparent prior breast biopsy on the right.  A MediPort is also seen entering the right internal jugular vein.     CT of abdomen and pelvis without contrast:     The liver is enlarged measuring 22.7 cm in its craniocaudal dimension.  Decreased attenuation is also visible in the liver parenchyma consistent with generalized hepatic steatosis.  The spleen appears normal except for multiple incidental punctate granulomatous calcifications.  The pancreas and adrenals appear normal.  The gallbladder is surgically absent.  No parenchymal abnormality, hydronephrosis or intrarenal calculi are visible in either kidney.  No free intraperitoneal fluid or lymph node enlargement is identified.     Images obtained through the pelvis demonstrate no gross abnormality in an incompletely distended urinary bladder.  The uterus and ovaries are surgically absent.  No free pelvic fluid or abnormal soft tissue masses are identified.     Impression:  Emphysematous changes, most severe in the right upper lobe.  Stable faint noncalcified nodular focal pulmonary scarring in the right upper lobe compared to 01/13/2024.  Hepatomegaly and generalized hepatic steatosis.  Status post cholecystectomy and hysterectomy.    Assessment and Plan   Stage IV (T2 N1b M1) Breast Cancer, +/+/-  Restaging CT CAP 04/15/24 Stable  Continue Ibrance and Letrozole      Abdominal Cramping  Diarrhea  Patient reports severe abdominal cramping after bowel movements; also having diarrhea 2-3x/day (likely 2/2 Ibrance); no BRBPR or melena  Hx of pelvic radiation  Trying Imodium with mild relief; also taking Lomotil  S/p colonoscopy 08/05/24 with focal, active colitis; Repeat colonoscopy in  10 years for screening purposes.       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   Dec 2020 patient had vaginal bleeding and MRI pelvis showed LAD  TB discussion 01/29/21: consensus for systemic chemotherapy given advanced cervical squamous cell carcinoma with cisplatin, paclitaxel and bevacizumab with discontinuation of palbociclib but can continue aromatase inhibitor; taxane may also be active for her concomitant metastatic breast cancer.   Renetta genetic testing negative  Treated with 6C (initiated 02/21/21) of Cis/Paclitaxel/Karina with improvement of disease and started on maintenance Karina   PET-CT January 2022 notable for uptake in cervical region concerning for oligo progression/recurrence of her disease.  Previously biopsy proven metastatic cervical cancer in lungs without evidence of recurrent mass/lymphadenopathy/avidity in this region or otherwise.  Treated with chemo-immunotherapy (C1 Pembrolizumab and Carbo/Paclitaxel)  and vaginal brachy therapy(done at [facility])  Restaging scans after this showed no evidence of recurrence and decision to hold therapy made; she was resumed on Ibrance for metastatic breast cancer  Continue with surveillance imaging       Chronic Medical Conditions  Hx of CVA June 2023  COPD  History of papillary thyroid carcinoma status post total thyroidectomy on 08/31/2017:   s/p total thyroidectomy on levothyroxine.   Hypothyroidism:   Previously on levothyroxine 275 mcg daily.    TFTs normalized   Increased Synthroid to 300 mcg daily and TFTs improved.  Continue Synthroid 300 mcg daily  Continue repeat thyroid function labs for monitoring.  Refer to endocrinology   Macrocytosis:   Possibly related to thyroid disorder will continue to  monitor.  She is on vitamin-B supplement.  No new anemia.  Neuropathy:     Mild, will monitor   Tobacco abuse:    Precontemplative; continued cessation encouraged; will obtain LDCT of chest for screening purposes; discussed with patient and she is in agreement        Med Onc Chart Routing      Follow up with physician 3 months.   Follow up with MIGUEL 4 weeks and 2 months.   Infusion scheduling note    Injection scheduling note    Labs CBC, CMP, phosphorus and magnesium   Scheduling:  Preferred lab:  Lab interval:     Imaging Other   CT Chest   Pharmacy appointment    Other referrals                  The patient was seen, interviewed and examined. Pertinent lab and radiologic studies were reviewed. Pt instructed to call should they develop concerning signs/symptoms or have further questions.        Portions of the record may have been created with voice recognition software. Occasional wrong-word or sound-a-like substitutions may have occurred due to the inherent limitations of voice recognition software. Read the chart carefully and recognize, using context, where substitutions have occurred.      Donita Nuñez MD    Hematology/Oncology

## 2024-08-15 NOTE — PROGRESS NOTES
Palliative Medicine Clinic Note  Follow-up        Consult Requested By: Dr. Ruff      Reason for Consult: Symptom  Management    Chief Complaint: Right ankle/foot fracture/pain and decreased mobility.           ASSESSMENT/PLAN:      Plan/Recommendations:    Problem List Items Addressed This Visit          Psychiatric    Anxiety and depression     Anxiety/Depression  Related to cancer diagnosis, financial stress, and recent injury   Xanax 0.5mg BID PRN   Continue Duloxetine 60mg daily. Crcl 69.5ml/min  Pt declined mental health counseling referral at this time  SW consult to assist with financial resources.                Oncology    Malignant neoplasm of overlapping sites of right breast in female, estrogen receptor positive - Primary     -Followed by Dr. Ruff. Previously Dr. Joseph, Dr. Asencio  -On Surveillance- Continues Letrozole and Ibrance  CT C/A/P: 08/14/2024: 1. I see no evidence to suggest metastatic disease in the chest, abdomen nor pelvis  2.  Emphysema is present; it is an independent risk factor for lung cancer. Recommend patient be evaluated for low dose cancer screening protocol.  3.  Stable right upper lobe pulmonary nodule as described above  4.  Coronary artery calcifications  5.  Fatty liver  6.  Left renal simple cyst (no further follow-up nor evaluation recommended for this isolated abnormality)           Neoplasm related pain     Neoplasm related pain  Uncontrolled- Due to right ankle/foot fracture.   -Norco 10mg BID to TID PRN  -Pt followed by Dr. Prince at  Orthopedics.   -Post right ankle fixation on June 13, 2024.   -Plans to wean Norco back to 5mg TID PRN once right ankle/foot fracture pain improves.   -Patient instructed to contact me if 3 day supply of meds left.   -Narcan RX given and explained use to pt and family. Pt and family verbalized understanding.    -Pain Contract-4/18/2023  -UDS- Collected 7/23/ 2024- Positive for Norco, Marijuana. Did not show prescribed  Xanax  -Repeat UDS at next visit.             Palliative Care    Palliative care encounter     -Code status: DNR w/Selective treatment. LaPOST 01/6/2022.   -HCPOA: Sister: David Shuff: [phone]   -GOC:  Continue cancer treatment, symptom management, maintain independence and functional status.   -See past visit notes for further details           Other Visit Diagnoses       Unspecified fracture of right foot, initial encounter for closed fracture                           Advance Care Planning   Advance Directives:   Living Will: Yes        Copy on chart: Yes    LaPOST: Yes    Do Not Resuscitate Status: Yes    Medical Power of : Yes    Agent's Name:  Sister: David Acevedo   Agent's Contact Number:  [phone]    Decision Making:  Patient answered questions  Goals of Care: The patient endorses that what is most important right now is to focus on remaining as independent as possible, symptom/pain control, and quality of life, even if it means sacrificing a little time    Accordingly, we have decided that the best plan to meet the patient's goals includes continuing with treatment.        Follow up: 1 month re: pain management/UDS     Plan discussed with Patient and spouse.     SUBJECTIVE:      History of Present Illness / Interval History:  Josey Flores is a 52 y.o. female with history of metastatic breast cancer, thyroid cancer post thyroidectomy (2017), and cervical cancer (2017). She is on surveillance on Letrozole and Ibrance.  S/p stroke with left-sided weakness (June 02, 2023).   Right foot fracture 05/16/2024- Post fixation June 13, 2024- Dr. Prince ([facility]).   She is followed by Dr. Ruff.  Presents to Palliative Care Clinic for pain and anxiety.   Please see previous palliative care notes for more details on pt's care so far.       8/15/24: History obtained from patient and medical record.   Pt attended clinic with her , Mr. Mccallum. Seen sitting in wheelchair. No  acute distress. Vital signs stable on room air.  Right leg pain stable on Norco 10mg BID-TID PRN. Able to complete light active ROM exercises/some weight bearing. She continues to follow with Dr. Prince ([facility])  Persistent anxiety depression due to financial stress, recent right leg injury, and weight gain (she reported eating more to manage stress). She is also taking Xanax TID (prescribed (BID) and Duloxetine.  We have discussed the importance of taking prescribed Xanax as prescribed. She wants to try cancer patient peer mentoring program. She stated Dr. Ruff will assist with this.   Discussed pt's UDS result on 7/23 positive for Marijuana, but not prescribed Xanax. Pt stated she is using Marijuana for pain and anxiety. She is unable to obtain medical marijuana card due to cost. She states she is using Xanax, but unsure why it did not show in UDS. We have discussed the risk of using unprescribed Marijuana including unintentional substance overdose due to Marijuana contamination with other substances (opioids, cocaine, etc). .     Past visits:     07/23/2024: History obtained from patient and medical record.   Pt attended clinic with her , Mr. Mccallum. Seen sitting in wheelchair. No acute distress. Vital signs stable on room air.  Right leg pain stable on Norco 10mg TID PRN. Able to complete light active ROM exercises. Next f/u visit w/ Dr. Prince (Orth[facility]) July 30th, 2024.   Persistent anxiety depression due to financial stress and recent right leg injury. She is unsure if she has Duloxetine at home. She takes Xanax  more often.   We have discussed the importance of taking Duloxetine to manage her mood, instead of taking Xanax alone.   Colonoscopy scheduled for Aug 05, 2024 per GI (Dr. Beckman) recommendations.      6/25/2024: Pt attended clinic with her , Mr. Mccallum. Seen sitting in wheelchair. She appeared uncomfortable.   She reported worsening right leg/foot pain following fixation on  June 13, 2024 by Dr. Prince at [facility]. Follow-up on July 2nd.   She is taking Norco 10mg Q8H PRN with little relief.   Persistent abdominal cramping pending GI visit with Dr. Beckman on July 16, 2024.  She her mood is low due to decreased mobility post right ankle fracture and persistent pain.   She requested assistance with obtaining a wheelchair through DME. SW aware.      06/11/2024: History obtained from: Patient  Pt seen via audiovisual feed. A&O x3. Seen sitting in a car. No acute distress.   She reported persistent right ankle pain due to right foot fracture on May 16, 2024  She has been taking Norco 10mg Q6-8H PRN due to worsening pain.   She reported she is scheduled to undergo fixation of the right ankle on June 13th, 2024 by Dr. Prince at [facility].   Her mood is stable  on Duloxetine and Xanax PRN.     05/09/2024: History obtained from: Patient  and , Mr. Mccallum.   Pt attended clinic with her , Mr. Mccallum. She appeared uncomfortable.   Vital signs stable on room air.   She reported persistent lower abd/pelvic pain and bilateral ribs pain. She has been takin OTC Acetaminophen with no significant effect.   She saw Dr. Zurita (Chronic Pain). He increased Duloxetine and Lyrica, but Norco was discontinued.   She was previously on Norco 10mg QID PRN. Last 30-day refill was Dec. 2023.   She attempted to wean to Norco 5mg TID, but has been unsuccessful.   She has been anxious and depressed due to financial stress. Last Xanax refill Jan. 2024.   No follow-up with her PCP or chronic pain since January 2024.       1/11/2024:   Pt attended clinic with her partner, Mr. Mccallum. She appeared uncomfortable. Vital signs stable on room air.   She has established care with chronic pain provider, Dr. Zurita  She reported she is now taking Lyrica and Duloxetine 60mg daily.   However, she has noted worsening abd/pelvic pain.   She is also attempting to self-wean her Norco 10mg Q6H  PRN. She is now taking Norco 5mg Q6H PRN. Self weaning started 4-5 days ago   She is concerned about running out of narcotics and Dr. Zurita not willing to prescribe narcotics. I have explained to her I can not prescribe narcotics as her previous scans have shown no evidence of recurrence/metastasis.   Next CT on Saturday, Jan 13, 2024  Anxiety/Depression due to her dog's recent death.    Unsuccessful with smoking cessation due to stress    ROS:  Review of Systems   Constitutional:  Positive for appetite change (Increased due to stress) and fatigue. Negative for activity change and unexpected weight change.   HENT:  Negative for hearing loss, sore throat, trouble swallowing and voice change.    Eyes:  Negative for visual disturbance.   Respiratory:  Negative for cough, chest tightness, shortness of breath and wheezing.    Cardiovascular:  Negative for chest pain, palpitations and leg swelling.   Gastrointestinal:  Positive for abdominal pain (Intermittent lower abd cramps). Negative for blood in stool, nausea, rectal pain and vomiting.   Genitourinary:  Negative for bladder incontinence, difficulty urinating, flank pain, hematuria, nocturia, pelvic pain and urgency.   Musculoskeletal:  Positive for arthralgias, leg pain (Right ankle/foot) and myalgias. Negative for neck pain.   Integumentary:  Positive for wound (Right leg/ankle- surgical site).   Allergic/Immunologic: Negative for environmental allergies, food allergies and immunocompromised state.   Neurological:  Positive for weakness (Right leg post fx/fixation). Negative for dizziness, tremors, numbness, headaches, memory loss and coordination difficulties.   Hematological:  Negative for adenopathy. Does not bruise/bleed easily.   Psychiatric/Behavioral:  Positive for dysphoric mood (Due to decreased mobility following R ankle fx, financial stress, and weight gain) and sleep disturbance. Negative for agitation, behavioral problems, confusion, decreased  concentration, self-injury and suicidal ideas. The patient is nervous/anxious (exacerbated by recent r ankle fx, financial stress, and weight gain.).        Review of Symptoms      Symptom Assessment (ESAS 0-10 Scale)  Pain:  7  Dyspnea:  0  Anxiety:  9  Nausea:  0  Depression:  9  Anorexia:  5  Fatigue:  8  Insomnia:  8  Restlessness:  8  Agitation:  8     CAM / Delirium:  Negative  Constipation:  Negative  Diarrhea:  Negative    Anxiety:  Is nervous/anxious (exacerbated by recent r ankle fx, financial stress, and weight gain.)    Bowel Management Plan (BMP):  No      Pain Assessment:    Location(s): ankle    Ankle       Location: right       Quality: Aching and pressure-like        Quantity: 7/10 in intensity        Chronicity: Onset 2 month(s) ago, unchanged since Right ankle fracture on May 16th, 2024. Post fixation June 13, 2024- Dr. Prince ([facility]).         Aggravating Factors: Pressure and movement        Alleviating Factors: Opiates        Associated Symptoms: Myalgias and arthralgias    Modified Osmar Scale:  0    Performance Status:  90    ECOG Performance Status ndGndrndanddndend:nd nd2nd Living Arrangements:  Lives with spouse    Psychosocial/Cultural:   See Palliative Psychosocial Note: Yes  Lives with her significant other of 16 years: they have 3 children combined, 2 sons from previous marriage.    **Primary  to Follow**  Palliative Care  Consult: Yes            Medications:    Current Outpatient Medications:     albuterol (PROVENTIL/VENTOLIN HFA) 90 mcg/actuation inhaler, Inhale 2 puffs into the lungs every 4 (four) hours as needed for Wheezing or Shortness of Breath., Disp: 18 g, Rfl: 11    ALPRAZolam (XANAX) 0.5 MG tablet, Take 1 tablet (0.5 mg total) by mouth 2 (two) times daily as needed for Anxiety., Disp: 60 tablet, Rfl: 0    atorvastatin (LIPITOR) 40 MG tablet, Take 1 tablet (40 mg total) by mouth once daily., Disp: 90 tablet, Rfl: 3    calcium carbonate 400 mg  calcium (1,000 mg) Chew, Take 2,000 mg by mouth once daily., Disp: , Rfl:     DULoxetine (CYMBALTA) 60 MG capsule, Take 1 capsule (60 mg total) by mouth once daily., Disp: 30 capsule, Rfl: 2    ergocalciferol (ERGOCALCIFEROL) 50,000 unit Cap, Take 1 capsule (50,000 Units total) by mouth every 7 days., Disp: 12 capsule, Rfl: 2    ergocalciferol (VITAMIN D2) 50,000 unit Cap, Take 5,000 Units by mouth once daily at 6am. , Disp: , Rfl:     folic acid (FOLVITE) 1 MG tablet, Take 1 tablet (1 mg total) by mouth once daily., Disp: 100 tablet, Rfl: 3    HYDROcodone-acetaminophen (NORCO)  mg per tablet, Take 1 tablet by mouth every 6 (six) hours as needed for Pain (4 doses/day)., Disp: 120 tablet, Rfl: 0    hydrOXYzine HCL (ATARAX) 25 MG tablet, Take 1 tablet (25 mg total) by mouth 3 (three) times daily as needed for Itching., Disp: 90 tablet, Rfl: 1    ipratropium (ATROVENT) 42 mcg (0.06 %) nasal spray, 2 sprays by Nasal route 4 (four) times daily. As needed for rhinitis. Take in evening before bed and in the morning, Disp: 15 mL, Rfl: 11    letrozole (FEMARA) 2.5 mg Tab, Take 1 tablet (2.5 mg total) by mouth once daily., Disp: 30 tablet, Rfl: 2    levothyroxine (SYNTHROID) 300 MCG Tab, Take 1 tablet (300 mcg total) by mouth before breakfast., Disp: 30 tablet, Rfl: 11    multivitamin (THERAGRAN) per tablet, Take 1 tablet by mouth once daily., Disp: , Rfl:     naloxone (NARCAN) 4 mg/actuation Spry, SMARTSIG:Both Nares, Disp: , Rfl:     palbociclib (IBRANCE) 125 mg Cap, Take one capsule (125 mg) by mouth once daily on days 1-21 of each 28-day cycle., Disp: 21 capsule, Rfl: 11    pentoxifylline (TRENTAL) 400 mg TbSR, Take 1 tablet (400 mg total) by mouth 2 (two) times daily with meals., Disp: 60 tablet, Rfl: 11    potassium chloride SA (K-DUR,KLOR-CON) 20 MEQ tablet, Take 1 tablet (20 mEq total) by mouth once daily. Take daily for 3 days., Disp: 3 tablet, Rfl: 1    vitamin E 1000 UNIT capsule, Take 1 capsule (1,000 Units  "total) by mouth once daily., Disp: 30 capsule, Rfl: 11    clopidogreL (PLAVIX) 75 mg tablet, Take 1 tablet (75 mg total) by mouth once daily., Disp: 30 tablet, Rfl: 11  No current facility-administered medications for this visit.      External  database queried on 08/15/2024  by SUZIE REID :          Review of patient's allergies indicates:   Allergen Reactions    Gabapentin Swelling     Note: unilateral joint edema, unclear if due to gabapentin    Nsaids (non-steroidal anti-inflammatory drug) Other (See Comments)     Instructed not to take NSAID drugs with chemo pill.    Zometa [zoledronic acid] Other (See Comments)     Possible osteonecrosis jaw    Clindamycin Rash    Vancomycin analogues Itching           OBJECTIVE:         Physical Exam:        8/5/2024    12:09 PM 8/15/2024     8:24 AM 8/15/2024     9:23 AM   Vitals - 1 value per visit   SYSTOLIC 125 102 120   DIASTOLIC 68 71 71   Pulse 87 85 85   Temp 97.7 °F (36.5 °C) 97.6 °F (36.4 °C) 97.6 °F (36.4 °C)   Resp 18  18   SPO2 98 % 97 % 97 %   Weight (lb) 225  220   Weight (kg) 102.059  99.791   Height 5' 7" (1.702 m) 5' 7" (1.702 m) 5' 7" (1.702 m)   BMI (Calculated) 35.2  34.4   Pain Score  Eight Seven          Physical Exam  Vitals and nursing note reviewed.   Constitutional:       General: She is not in acute distress.     Appearance: Normal appearance. She is ill-appearing.   HENT:      Head: Normocephalic and atraumatic.      Nose: Nose normal. No congestion or rhinorrhea.      Mouth/Throat:      Mouth: Mucous membranes are moist.      Pharynx: Oropharynx is clear. No oropharyngeal exudate or posterior oropharyngeal erythema.   Eyes:      General: No scleral icterus.        Right eye: No discharge.         Left eye: No discharge.      Conjunctiva/sclera: Conjunctivae normal.      Pupils: Pupils are equal, round, and reactive to light.   Cardiovascular:      Rate and Rhythm: Normal rate and regular rhythm.      Pulses: Normal pulses.    "   Heart sounds: Normal heart sounds. No murmur heard.     No gallop.   Pulmonary:      Effort: Pulmonary effort is normal. No respiratory distress.      Breath sounds: Normal breath sounds. No stridor. No wheezing.   Chest:      Chest wall: No tenderness.   Abdominal:      General: Bowel sounds are normal. There is no distension.      Palpations: Abdomen is soft.      Tenderness: There is no abdominal tenderness.   Genitourinary:     Comments: Deferred  Musculoskeletal:         General: Swelling, tenderness and signs of injury (Right leg) present.      Cervical back: Normal range of motion and neck supple.      Right lower leg: No edema.      Left lower leg: No edema.      Right ankle: Tenderness present. Decreased range of motion.        Legs:       Comments: Right leg surgical site. Incision CDI. Color intact. Pt able to move toes without difficulty   Skin:     General: Skin is warm and dry.      Capillary Refill: Capillary refill takes less than 2 seconds.      Coloration: Skin is not jaundiced or pale.      Findings: No rash.   Neurological:      Mental Status: She is alert and oriented to person, place, and time.      Motor: Weakness (Right leg.) present.      Gait: Gait abnormal (Right leg injury. Sitting in wheelchair. Ambulates with walker at home.).   Psychiatric:         Attention and Perception: Attention and perception normal.         Mood and Affect: Mood normal.         Speech: Speech normal.         Behavior: Behavior normal. Behavior is cooperative.         Thought Content: Thought content normal.         Cognition and Memory: Cognition and memory normal.         Judgment: Judgment normal.           Labs: BMP  Lab Results   Component Value Date     08/15/2024    K 3.7 08/15/2024     08/15/2024    CO2 26 08/15/2024    BUN 12 08/15/2024    CREATININE 1.5 (H) 08/15/2024    CALCIUM 9.5 08/15/2024    ANIONGAP 11 08/15/2024    EGFRNORACEVR 41 (A) 08/15/2024     Lab Results   Component Value  Date    WBC 7.85 08/15/2024    RBC 3.67 (L) 08/15/2024    HGB 12.4 08/15/2024    HCT 37.6 08/15/2024     (H) 08/15/2024    MCH 33.8 (H) 08/15/2024    MCHC 33.0 08/15/2024    RDW 14.6 (H) 08/15/2024     08/15/2024    MPV 11.6 08/15/2024    GRAN 4.5 08/15/2024    GRAN 56.9 08/15/2024    LYMPH 2.3 08/15/2024    LYMPH 29.8 08/15/2024    MONO 0.6 08/15/2024    MONO 8.0 08/15/2024    EOS 0.2 08/15/2024    BASO 0.06 08/15/2024    EOSINOPHIL 2.7 08/15/2024    BASOPHIL 0.8 08/15/2024          Imaging:     CT C/A/P: 08/14/2024: 1. I see no evidence to suggest metastatic disease in the chest, abdomen nor pelvis  2.  Emphysema is present; it is an independent risk factor for lung cancer. Recommend patient be evaluated for low dose cancer screening protocol.  3.  Stable right upper lobe pulmonary nodule as described above  4.  Coronary artery calcifications  5.  Fatty liver  6.  Left renal simple cyst (no further follow-up nor evaluation recommended for this isolated abnormality)     I spent a total of 35 minutes on the day of the visit. This includes face to face time in discussion of goals of care, symptom assessment, coordination of care and emotional support.  This also includes non-face to face time preparing to see the patient (eg, review of tests/imaging), obtaining and/or reviewing separately obtained history, documenting clinical information in the electronic or other health record, independently interpreting results and communicating results to the patient/family/caregiver, or care coordinator.         SUZIE GOTTI NP

## 2024-08-16 ENCOUNTER — TELEPHONE (OUTPATIENT)
Dept: HEMATOLOGY/ONCOLOGY | Facility: CLINIC | Age: 54
End: 2024-08-16
Payer: MEDICAID

## 2024-08-16 ENCOUNTER — PATIENT MESSAGE (OUTPATIENT)
Dept: HEMATOLOGY/ONCOLOGY | Facility: CLINIC | Age: 54
End: 2024-08-16
Payer: MEDICAID

## 2024-08-16 NOTE — TELEPHONE ENCOUNTER
Swer was consulted to assist with [id number] support for pt. On 8/15/24.    9:04 am - Swr called pt ([phone]). Pt spouse answered and reported pt was sleeping at time of swer call and would tell pt to return swer call when pt is available.     ----- Message from Donita Ruff MD sent at 8/15/2024 10:06 AM CDT -----  Regarding: Support Groups  Hi!     Do you all know of any peer-peer support groups.     This patient isn't big on the group setting but is open to talking to someone individually and it would be great to connect her with a survivor one on one.

## 2024-08-21 DIAGNOSIS — C79.81 METASTATIC MALIGNANT NEOPLASM TO BREAST: Primary | ICD-10-CM

## 2024-08-21 DIAGNOSIS — F41.9 ANXIETY AND DEPRESSION: ICD-10-CM

## 2024-08-21 DIAGNOSIS — F32.A ANXIETY AND DEPRESSION: ICD-10-CM

## 2024-08-21 RX ORDER — ALPRAZOLAM 0.5 MG/1
0.5 TABLET ORAL 2 TIMES DAILY PRN
Qty: 60 TABLET | Refills: 0 | Status: SHIPPED | OUTPATIENT
Start: 2024-08-21 | End: 2024-09-20

## 2024-08-22 DIAGNOSIS — S92.901A UNSPECIFIED FRACTURE OF RIGHT FOOT, INITIAL ENCOUNTER FOR CLOSED FRACTURE: ICD-10-CM

## 2024-08-22 DIAGNOSIS — Z17.0 MALIGNANT NEOPLASM OF OVERLAPPING SITES OF RIGHT BREAST IN FEMALE, ESTROGEN RECEPTOR POSITIVE: ICD-10-CM

## 2024-08-22 DIAGNOSIS — C79.81 METASTATIC MALIGNANT NEOPLASM TO BREAST: Primary | ICD-10-CM

## 2024-08-22 DIAGNOSIS — C50.811 MALIGNANT NEOPLASM OF OVERLAPPING SITES OF RIGHT BREAST IN FEMALE, ESTROGEN RECEPTOR POSITIVE: ICD-10-CM

## 2024-08-22 DIAGNOSIS — G89.3 NEOPLASM RELATED PAIN: ICD-10-CM

## 2024-08-23 RX ORDER — HYDROCODONE BITARTRATE AND ACETAMINOPHEN 10; 325 MG/1; MG/1
1 TABLET ORAL EVERY 6 HOURS PRN
Qty: 120 TABLET | Refills: 0 | Status: SHIPPED | OUTPATIENT
Start: 2024-08-23 | End: 2024-09-22

## 2024-08-27 NOTE — ASSESSMENT & PLAN NOTE
-Followed by Dr. Ruff. Previously Dr. Joseph, Dr. Asencio  -On Surveillance- Continues Letrozole and Ibrance  CT C/A/P: 08/14/2024: 1. I see no evidence to suggest metastatic disease in the chest, abdomen nor pelvis  2.  Emphysema is present; it is an independent risk factor for lung cancer. Recommend patient be evaluated for low dose cancer screening protocol.  3.  Stable right upper lobe pulmonary nodule as described above  4.  Coronary artery calcifications  5.  Fatty liver  6.  Left renal simple cyst (no further follow-up nor evaluation recommended for this isolated abnormality)

## 2024-08-27 NOTE — ASSESSMENT & PLAN NOTE
Anxiety/Depression  Related to cancer diagnosis, financial stress, and recent injury   Xanax 0.5mg BID PRN   Continue Duloxetine 60mg daily. Crcl 69.5ml/min  Pt declined mental health counseling referral at this time  SW consult to assist with financial resources.

## 2024-08-27 NOTE — ASSESSMENT & PLAN NOTE
-Code status: DNR w/Selective treatment. LaPOST 01/6/2022.   -HCPOA: Sister: David Shuff: [phone]   -GOC:  Continue cancer treatment, symptom management, maintain independence and functional status.   -See past visit notes for further details

## 2024-08-27 NOTE — ASSESSMENT & PLAN NOTE
Neoplasm related pain  Uncontrolled- Due to right ankle/foot fracture.   -Norco 10mg BID to TID PRN  -Pt followed by Dr. Prince at  [facility].   -Post right ankle fixation on June 13, 2024.   -Plans to wean Norco back to 5mg TID PRN once right ankle/foot fracture pain improves.   -Patient instructed to contact me if 3 day supply of meds left.   -Narcan RX given and explained use to pt and family. Pt and family verbalized understanding.    -Pain Contract-4/18/2023  -UDS- Collected 7/23/ 2024- Positive for Norco, Marijuana. Did not show prescribed Xanax  -Repeat UDS at next visit.    97.8

## 2024-09-03 DIAGNOSIS — Z17.0 MALIGNANT NEOPLASM OF OVERLAPPING SITES OF RIGHT BREAST IN FEMALE, ESTROGEN RECEPTOR POSITIVE: ICD-10-CM

## 2024-09-03 DIAGNOSIS — C50.811 MALIGNANT NEOPLASM OF OVERLAPPING SITES OF RIGHT BREAST IN FEMALE, ESTROGEN RECEPTOR POSITIVE: ICD-10-CM

## 2024-09-03 DIAGNOSIS — C77.3 MALIGNANT NEOPLASM METASTATIC TO LYMPH NODE OF AXILLA: ICD-10-CM

## 2024-09-17 ENCOUNTER — TELEPHONE (OUTPATIENT)
Dept: GASTROENTEROLOGY | Facility: CLINIC | Age: 54
End: 2024-09-17
Payer: MEDICAID

## 2024-09-17 ENCOUNTER — OFFICE VISIT (OUTPATIENT)
Dept: PALLIATIVE MEDICINE | Facility: CLINIC | Age: 54
End: 2024-09-17
Payer: MEDICAID

## 2024-09-17 VITALS
BODY MASS INDEX: 36.95 KG/M2 | SYSTOLIC BLOOD PRESSURE: 115 MMHG | DIASTOLIC BLOOD PRESSURE: 65 MMHG | TEMPERATURE: 98 F | OXYGEN SATURATION: 98 % | WEIGHT: 235.44 LBS | RESPIRATION RATE: 18 BRPM | HEIGHT: 67 IN | HEART RATE: 91 BPM

## 2024-09-17 DIAGNOSIS — F17.200 NEEDS SMOKING CESSATION EDUCATION: ICD-10-CM

## 2024-09-17 DIAGNOSIS — F41.9 ANXIETY AND DEPRESSION: ICD-10-CM

## 2024-09-17 DIAGNOSIS — Z51.5 PALLIATIVE CARE ENCOUNTER: ICD-10-CM

## 2024-09-17 DIAGNOSIS — Z17.0 MALIGNANT NEOPLASM OF OVERLAPPING SITES OF RIGHT BREAST IN FEMALE, ESTROGEN RECEPTOR POSITIVE: ICD-10-CM

## 2024-09-17 DIAGNOSIS — F32.A ANXIETY AND DEPRESSION: ICD-10-CM

## 2024-09-17 DIAGNOSIS — G89.3 NEOPLASM RELATED PAIN: ICD-10-CM

## 2024-09-17 DIAGNOSIS — Z72.0 TOBACCO ABUSE: ICD-10-CM

## 2024-09-17 DIAGNOSIS — C50.811 MALIGNANT NEOPLASM OF OVERLAPPING SITES OF RIGHT BREAST IN FEMALE, ESTROGEN RECEPTOR POSITIVE: ICD-10-CM

## 2024-09-17 DIAGNOSIS — S92.901A UNSPECIFIED FRACTURE OF RIGHT FOOT, INITIAL ENCOUNTER FOR CLOSED FRACTURE: ICD-10-CM

## 2024-09-17 DIAGNOSIS — C79.81 METASTATIC MALIGNANT NEOPLASM TO BREAST: Primary | ICD-10-CM

## 2024-09-17 PROCEDURE — 99999 PR PBB SHADOW E&M-EST. PATIENT-LVL V: CPT | Mod: PBBFAC,,,

## 2024-09-17 PROCEDURE — 99215 OFFICE O/P EST HI 40 MIN: CPT | Mod: PBBFAC

## 2024-09-17 NOTE — TELEPHONE ENCOUNTER
Spoke with patient on [phone] to schedule appointment. Patient voices understanding to scheduled appointment and added to the wait-list should someone cancel or reschedule.    ----- Message from Marilin Hart NP sent at 9/17/2024  1:00 PM CDT -----  Regarding: Requesting follow-up for diarrhea  Good afternoon,    Ms. Schmitz reported persistent diarrhea. She and her  are asking for GI follow-up.     Her , Mr. Mccallum wants to discuss the possibility of IBS or diet causing the diarrhea.     Can you please see pt?    Thank you,     EB.

## 2024-09-17 NOTE — PROGRESS NOTES
Palliative Medicine Clinic Note  Follow-up        Consult Requested By: Dr. Ruff      Reason for Consult: Symptom  Management    Chief Complaint: Right ankle/foot fracture/pain and decreased mobility.           ASSESSMENT/PLAN:      Plan/Recommendations:    Problem List Items Addressed This Visit          Psychiatric    Anxiety and depression    Relevant Orders    Ambulatory referral/consult to Psychology       Oncology    Malignant neoplasm of overlapping sites of right breast in female, estrogen receptor positive     -Followed by Dr. Ruff. Previously Dr. Joseph, Dr. Asencio  -On Surveillance- Continues Letrozole and Ibrance  CT C/A/P: 08/14/2024: 1. I see no evidence to suggest metastatic disease in the chest, abdomen nor pelvis  2.  Emphysema is present; it is an independent risk factor for lung cancer. Recommend patient be evaluated for low dose cancer screening protocol.  3.  Stable right upper lobe pulmonary nodule as described above  4.  Coronary artery calcifications  5.  Fatty liver  6.  Left renal simple cyst (no further follow-up nor evaluation recommended for this isolated abnormality)           Neoplasm related pain     Neoplasm related pain  Uncontrolled- Due to right ankle/foot fracture.   -Norco 10mg BID to TID PRN  -Pt followed by Dr. Prince at  Or[facility].   -Post right ankle fixation on June 13, 2024.   -Plans to wean Norco back to 5mg TID PRN once right ankle/foot fracture pain improves.   -Patient instructed to contact me if 3 day supply of meds left.   -Narcan RX given and explained use to pt and family. Pt and family verbalized understanding.    -Pain Contract-4/18/2023  -UDS- 09/17/2024: Results pending   Collected 7/23/ 2024- Positive for Norco, Marijuana. Did not show prescribed Xanax              Palliative Care    Palliative care encounter     -Code status: DNR w/Selective treatment. [place]POST 01/6/2022.   -HCPOA: Sister: David Shuff: [phone]   -GOC:  Continue cancer  treatment, symptom management, maintain independence and functional status.   -See past visit notes for further details           Other Visit Diagnoses       Metastatic malignant neoplasm to breast    -  Primary    Relevant Orders    Ambulatory referral/consult to Smoking Cessation Program    Ambulatory referral/consult to Psychology    Tobacco abuse        Relevant Orders    Ambulatory referral/consult to Smoking Cessation Program    Needs smoking cessation education        Relevant Orders    Ambulatory referral/consult to Smoking Cessation Program                           Advance Care Planning   Advance Directives:   Living Will: Yes        Copy on chart: Yes    [place]POST: Yes    Do Not Resuscitate Status: Yes    Medical Power of : Yes    Agent's Name:  Sister: David Acevedo   Agent's Contact Number:  [phone]    Decision Making:  Patient answered questions  Goals of Care: The patient endorses that what is most important right now is to focus on remaining as independent as possible, symptom/pain control, and quality of life, even if it means sacrificing a little time    Accordingly, we have decided that the best plan to meet the patient's goals includes continuing with treatment.        Follow up: 2 months     Plan discussed with Patient and spouse.     SUBJECTIVE:      History of Present Illness / Interval History:  Josey Flores is a 52 y.o. female with history of metastatic breast cancer, thyroid cancer post thyroidectomy (2017), and cervical cancer (2017). She is on surveillance on Letrozole and Ibrance.  S/p stroke with left-sided weakness (June 02, 2023).   Right foot fracture 05/16/2024- Post fixation June 13, 2024- Dr. Prince ([facility]).   She is followed by Dr. Ruff.  Presents to Palliative Care Clinic for pain and anxiety.   Please see previous palliative care notes for more details on pt's care so far.       9/17/24:  History obtained from patient and medical record.    Pt attended clinic with her , Mr. Mccallum. No acute distress. Vital signs stable on room air.  She is now able to bear weight on right leg with relatively stable. She is taking Norco 10mg TID PRN  Abdominal pain and diarrhea post -meal somewhat managed with imodium/lomotil.   No f/u with GI after she was told her colonoscopy showed no evidence of abnormality.   She continues to smoke 1-2ppd to cope with anxiety and stress. She wishes to return to smoking cessation counseling and mental health counseling.     Past visits:   08/15/2024: History obtained from patient and medical record.   Pt attended clinic with her , Mr. Mccallum. Seen sitting in wheelchair. No acute distress. Vital signs stable on room air.  Right leg pain stable on Norco 10mg BID-TID PRN. Able to complete light active ROM exercises/some weight bearing. She continues to follow with Dr. Prince ( Orthopedics)  Persistent anxiety depression due to financial stress, recent right leg injury, and weight gain (she reported eating more to manage stress). She is also taking Xanax TID (prescribed (BID) and Duloxetine.  We have discussed the importance of taking prescribed Xanax as prescribed. She wants to try cancer patient peer mentoring program. She stated Dr. Ruff will assist with this.   Discussed pt's UDS result on 7/23 positive for Marijuana, but not prescribed Xanax. Pt stated she is using Marijuana for pain and anxiety. She is unable to obtain medical marijuana card due to cost. She states she is using Xanax, but unsure why it did not show in UDS. We have discussed the risk of using unprescribed Marijuana including unintentional substance overdose due to Marijuana contamination with other substances (opioids, cocaine, etc). .     07/23/2024: History obtained from patient and medical record.   Pt attended clinic with her , Mr. Mccallum. Seen sitting in wheelchair. No acute distress. Vital signs stable on room air.  Right leg pain  stable on Norco 10mg TID PRN. Able to complete light active ROM exercises. Next f/u visit w/ Dr. Prince (Ortho) July 30th, 2024.   Persistent anxiety depression due to financial stress and recent right leg injury. She is unsure if she has Duloxetine at home. She takes Xanax  more often.   We have discussed the importance of taking Duloxetine to manage her mood, instead of taking Xanax alone.   Colonoscopy scheduled for Aug 05, 2024 per GI (Dr. Beckman) recommendations.      6/25/2024: Pt attended clinic with her , Mr. Mccallum. Seen sitting in wheelchair. She appeared uncomfortable.   She reported worsening right leg/foot pain following fixation on June 13, 2024 by Dr. Prince at [facility]. Follow-up on July 2nd.   She is taking Norco 10mg Q8H PRN with little relief.   Persistent abdominal cramping pending GI visit with Dr. Beckman on July 16, 2024.  She her mood is low due to decreased mobility post right ankle fracture and persistent pain.   She requested assistance with obtaining a wheelchair through DME. SW aware.      06/11/2024: History obtained from: Patient  Pt seen via audiovisual feed. A&O x3. Seen sitting in a car. No acute distress.   She reported persistent right ankle pain due to right foot fracture on May 16, 2024  She has been taking Norco 10mg Q6-8H PRN due to worsening pain.   She reported she is scheduled to undergo fixation of the right ankle on June 13th, 2024 by Dr. Prince at [facility].   Her mood is stable  on Duloxetine and Xanax PRN.     05/09/2024: History obtained from: Patient  and , Mr. Mccallum.   Pt attended clinic with her , Mr. Mccallum. She appeared uncomfortable.   Vital signs stable on room air.   She reported persistent lower abd/pelvic pain and bilateral ribs pain. She has been takin OTC Acetaminophen with no significant effect.   She saw Dr. Zurita (Chronic Pain). He increased Duloxetine and Lyrica, but Norco was discontinued.   She  was previously on Norco 10mg QID PRN. Last 30-day refill was Dec. 2023.   She attempted to wean to Norco 5mg TID, but has been unsuccessful.   She has been anxious and depressed due to financial stress. Last Xanax refill Jan. 2024.   No follow-up with her PCP or chronic pain since January 2024.       1/11/2024:   Pt attended clinic with her partner, Mr. Mccallum. She appeared uncomfortable. Vital signs stable on room air.   She has established care with chronic pain provider, Dr. Zurita  She reported she is now taking Lyrica and Duloxetine 60mg daily.   However, she has noted worsening abd/pelvic pain.   She is also attempting to self-wean her Norco 10mg Q6H PRN. She is now taking Norco 5mg Q6H PRN. Self weaning started 4-5 days ago   She is concerned about running out of narcotics and Dr. Zurita not willing to prescribe narcotics. I have explained to her I can not prescribe narcotics as her previous scans have shown no evidence of recurrence/metastasis.   Next CT on Saturday, Jan 13, 2024  Anxiety/Depression due to her dog's recent death.    Unsuccessful with smoking cessation due to stress    ROS:  Review of Systems   Constitutional:  Positive for appetite change (Increased due to stress) and fatigue. Negative for activity change and unexpected weight change.   HENT:  Negative for hearing loss, sore throat, trouble swallowing and voice change.    Eyes:  Negative for visual disturbance.   Respiratory:  Negative for cough, chest tightness, shortness of breath and wheezing.    Cardiovascular:  Negative for chest pain, palpitations and leg swelling.   Gastrointestinal:  Positive for abdominal pain (Intermittent lower abd cramps) and diarrhea. Negative for blood in stool, constipation, nausea, rectal pain and vomiting.   Genitourinary:  Negative for bladder incontinence, difficulty urinating, flank pain, hematuria, nocturia, pelvic pain and urgency.   Musculoskeletal:  Positive for arthralgias, leg pain (Right ankle/foot)  and myalgias. Negative for neck pain.   Integumentary:  Positive for wound (Right leg/ankle- surgical site).   Allergic/Immunologic: Negative for environmental allergies, food allergies and immunocompromised state.   Neurological:  Positive for weakness (Right leg post fx/fixation). Negative for dizziness, tremors, numbness, headaches, memory loss and coordination difficulties.   Hematological:  Negative for adenopathy. Does not bruise/bleed easily.   Psychiatric/Behavioral:  Positive for dysphoric mood (Due to decreased mobility following R ankle fx, financial stress, and weight gain) and sleep disturbance. Negative for agitation, behavioral problems, confusion, decreased concentration, self-injury and suicidal ideas. The patient is nervous/anxious (exacerbated by recent r ankle fx, financial stress, and weight gain.).        Review of Symptoms      Symptom Assessment (ESAS 0-10 Scale)  Pain:  7  Dyspnea:  6  Anxiety:  9  Nausea:  0  Depression:  8  Anorexia:  5  Fatigue:  6  Insomnia:  8  Restlessness:  9  Agitation:  8     CAM / Delirium:  Negative  Constipation:  Negative  Diarrhea:  Positive    Anxiety:  Is nervous/anxious (exacerbated by recent r ankle fx, financial stress, and weight gain.)  Constipation:  No constipation    Bowel Management Plan (BMP):  No      Comments:  Imodium/Lomotil    Pain Assessment:    Location(s): ankle    Ankle       Location: right       Quality: Aching and pressure-like        Quantity: 7/10 in intensity        Chronicity: Onset 2 month(s) ago, unchanged since Right ankle fracture on May 16th, 2024. Post fixation June 13, 2024- Dr. Prince ([facility]).         Aggravating Factors: Pressure and movement        Alleviating Factors: Opiates        Associated Symptoms: Myalgias and arthralgias    Modified Osmar Scale:  0    Performance Status:  90    ECOG Performance Status ndGndrndanddndend:nd nd2nd Living Arrangements:  Lives with spouse    Psychosocial/Cultural:   See Palliative  Psychosocial Note: Yes  Lives with her significant other of 16 years: they have 3 children combined, 2 sons from previous marriage.    **Primary  to Follow**  Palliative Care  Consult: Yes            Medications:    Current Outpatient Medications:     albuterol (PROVENTIL/VENTOLIN HFA) 90 mcg/actuation inhaler, Inhale 2 puffs into the lungs every 4 (four) hours as needed for Wheezing or Shortness of Breath., Disp: 18 g, Rfl: 11    ALPRAZolam (XANAX) 0.5 MG tablet, Take 1 tablet (0.5 mg total) by mouth 2 (two) times daily as needed for Anxiety., Disp: 60 tablet, Rfl: 0    atorvastatin (LIPITOR) 40 MG tablet, Take 1 tablet (40 mg total) by mouth once daily., Disp: 90 tablet, Rfl: 3    calcium carbonate 400 mg calcium (1,000 mg) Chew, Take 2,000 mg by mouth once daily., Disp: , Rfl:     DULoxetine (CYMBALTA) 60 MG capsule, Take 1 capsule (60 mg total) by mouth once daily., Disp: 30 capsule, Rfl: 2    ergocalciferol (ERGOCALCIFEROL) 50,000 unit Cap, Take 1 capsule by mouth once a week, Disp: 12 capsule, Rfl: 0    ergocalciferol (VITAMIN D2) 50,000 unit Cap, Take 5,000 Units by mouth once daily at 6am. , Disp: , Rfl:     folic acid (FOLVITE) 1 MG tablet, Take 1 tablet (1 mg total) by mouth once daily., Disp: 100 tablet, Rfl: 3    HYDROcodone-acetaminophen (NORCO)  mg per tablet, Take 1 tablet by mouth every 6 (six) hours as needed for Pain (4 doses/day)., Disp: 120 tablet, Rfl: 0    hydrOXYzine HCL (ATARAX) 25 MG tablet, Take 1 tablet (25 mg total) by mouth 3 (three) times daily as needed for Itching., Disp: 90 tablet, Rfl: 1    ipratropium (ATROVENT) 42 mcg (0.06 %) nasal spray, 2 sprays by Nasal route 4 (four) times daily. As needed for rhinitis. Take in evening before bed and in the morning, Disp: 15 mL, Rfl: 11    letrozole (FEMARA) 2.5 mg Tab, Take 1 tablet (2.5 mg total) by mouth once daily., Disp: 30 tablet, Rfl: 2    levothyroxine (SYNTHROID) 300 MCG Tab, Take 1 tablet (300 mcg  "total) by mouth before breakfast., Disp: 30 tablet, Rfl: 11    multivitamin (THERAGRAN) per tablet, Take 1 tablet by mouth once daily., Disp: , Rfl:     naloxone (NARCAN) 4 mg/actuation Spry, SMARTSIG:Both Nares, Disp: , Rfl:     palbociclib (IBRANCE) 125 mg Cap, Take one capsule (125 mg) by mouth once daily on days 1-21 of each 28-day cycle., Disp: 21 capsule, Rfl: 11    pentoxifylline (TRENTAL) 400 mg TbSR, Take 1 tablet (400 mg total) by mouth 2 (two) times daily with meals., Disp: 60 tablet, Rfl: 11    potassium chloride SA (K-DUR,KLOR-CON) 20 MEQ tablet, Take 1 tablet (20 mEq total) by mouth once daily. Take daily for 3 days., Disp: 3 tablet, Rfl: 1    vitamin E 1000 UNIT capsule, Take 1 capsule (1,000 Units total) by mouth once daily., Disp: 30 capsule, Rfl: 11    clopidogreL (PLAVIX) 75 mg tablet, Take 1 tablet (75 mg total) by mouth once daily., Disp: 30 tablet, Rfl: 11      External  database queried on 09/17/2024  by SUZIE REID :          Review of patient's allergies indicates:   Allergen Reactions    Gabapentin Swelling     Note: unilateral joint edema, unclear if due to gabapentin    Nsaids (non-steroidal anti-inflammatory drug) Other (See Comments)     Instructed not to take NSAID drugs with chemo pill.    Zometa [zoledronic acid] Other (See Comments)     Possible osteonecrosis jaw    Clindamycin Rash    Vancomycin analogues Itching           OBJECTIVE:         Physical Exam:        8/15/2024     8:24 AM 8/15/2024     9:23 AM 9/17/2024    11:39 AM   Vitals - 1 value per visit   SYSTOLIC 102 120 115   DIASTOLIC 71 71 65   Pulse 85 85 91   Temp 97.6 °F (36.4 °C) 97.6 °F (36.4 °C) 97.7 °F (36.5 °C)   Resp  18 18   SPO2 97 % 97 % 98 %   Weight (lb)  220 235.45   Weight (kg)  99.791 106.8   Height 5' 7" (1.702 m) 5' 7" (1.702 m) 5' 7" (1.702 m)   BMI (Calculated)  34.4 36.9   Pain Score Eight Seven Seven          Physical Exam  Vitals and nursing note reviewed.   Constitutional:       " General: She is not in acute distress.     Appearance: Normal appearance. She is ill-appearing.   HENT:      Head: Normocephalic and atraumatic.      Nose: Nose normal. No congestion or rhinorrhea.      Mouth/Throat:      Mouth: Mucous membranes are moist.      Pharynx: Oropharynx is clear. No oropharyngeal exudate or posterior oropharyngeal erythema.   Eyes:      General: No scleral icterus.        Right eye: No discharge.         Left eye: No discharge.      Conjunctiva/sclera: Conjunctivae normal.      Pupils: Pupils are equal, round, and reactive to light.   Cardiovascular:      Rate and Rhythm: Normal rate and regular rhythm.      Pulses: Normal pulses.      Heart sounds: Normal heart sounds. No murmur heard.     No gallop.   Pulmonary:      Effort: Pulmonary effort is normal. No respiratory distress.      Breath sounds: Normal breath sounds. No stridor. No wheezing.   Chest:      Chest wall: No tenderness.   Abdominal:      General: Bowel sounds are normal. There is no distension.      Palpations: Abdomen is soft.      Tenderness: There is no abdominal tenderness.   Genitourinary:     Comments: Deferred  Musculoskeletal:         General: Tenderness and signs of injury (Right leg) present. No swelling.      Cervical back: Normal range of motion and neck supple.      Right lower leg: No edema.      Left lower leg: No edema.      Right ankle: Tenderness present. Decreased range of motion.        Legs:       Comments: Right leg surgical site. Incision CDI. Color intact. Pt able to move toes without difficulty   Skin:     General: Skin is warm and dry.      Capillary Refill: Capillary refill takes less than 2 seconds.      Coloration: Skin is not jaundiced or pale.      Findings: No rash.   Neurological:      Mental Status: She is alert and oriented to person, place, and time.      Motor: Weakness (Right leg.) present.      Gait: Gait abnormal (Right leg injury. Now weight bearning without difficulty.).    Psychiatric:         Attention and Perception: Attention and perception normal.         Mood and Affect: Affect normal. Mood is anxious.         Speech: Speech normal.         Behavior: Behavior normal. Behavior is cooperative.         Thought Content: Thought content normal. Thought content does not include suicidal ideation. Thought content does not include suicidal plan.         Cognition and Memory: Cognition and memory normal.         Judgment: Judgment normal.           Labs: BMP  Lab Results   Component Value Date     08/15/2024    K 3.7 08/15/2024     08/15/2024    CO2 26 08/15/2024    BUN 12 08/15/2024    CREATININE 1.5 (H) 08/15/2024    CALCIUM 9.5 08/15/2024    ANIONGAP 11 08/15/2024    EGFRNORACEVR 41 (A) 08/15/2024     Lab Results   Component Value Date    WBC 7.85 08/15/2024    RBC 3.67 (L) 08/15/2024    HGB 12.4 08/15/2024    HCT 37.6 08/15/2024     (H) 08/15/2024    MCH 33.8 (H) 08/15/2024    MCHC 33.0 08/15/2024    RDW 14.6 (H) 08/15/2024     08/15/2024    MPV 11.6 08/15/2024    GRAN 4.5 08/15/2024    GRAN 56.9 08/15/2024    LYMPH 2.3 08/15/2024    LYMPH 29.8 08/15/2024    MONO 0.6 08/15/2024    MONO 8.0 08/15/2024    EOS 0.2 08/15/2024    BASO 0.06 08/15/2024    EOSINOPHIL 2.7 08/15/2024    BASOPHIL 0.8 08/15/2024          Imaging:     CT C/A/P: 08/14/2024: 1. I see no evidence to suggest metastatic disease in the chest, abdomen nor pelvis  2.  Emphysema is present; it is an independent risk factor for lung cancer. Recommend patient be evaluated for low dose cancer screening protocol.  3.  Stable right upper lobe pulmonary nodule as described above  4.  Coronary artery calcifications  5.  Fatty liver  6.  Left renal simple cyst (no further follow-up nor evaluation recommended for this isolated abnormality)     I spent a total of 35 minutes on the day of the visit. This includes face to face time in discussion of goals of care, symptom assessment, coordination of care and  emotional support.  This also includes non-face to face time preparing to see the patient (eg, review of tests/imaging), obtaining and/or reviewing separately obtained history, documenting clinical information in the electronic or other health record, independently interpreting results and communicating results to the patient/family/caregiver, or care coordinator.         SUZIE GOTTI, NP

## 2024-09-17 NOTE — PATIENT INSTRUCTIONS
We will  work on getting you a therapist, seeing the smoking cessation team  We will ask the GI team to see you again.

## 2024-09-19 NOTE — ASSESSMENT & PLAN NOTE
-Code status: DNR w/Selective treatment. [place]POST 01/6/2022.   -HCPOA: Sister: David Shuff: [phone]   -GOC:  Continue cancer treatment, symptom management, maintain independence and functional status.   -See past visit notes for further details

## 2024-09-19 NOTE — ASSESSMENT & PLAN NOTE
Neoplasm related pain  Uncontrolled- Due to right ankle/foot fracture.   -Norco 10mg BID to TID PRN  -Pt followed by Dr. Prince at  [facility].   -Post right ankle fixation on June 13, 2024.   -Plans to wean Norco back to 5mg TID PRN once right ankle/foot fracture pain improves.   -Patient instructed to contact me if 3 day supply of meds left.   -Narcan RX given and explained use to pt and family. Pt and family verbalized understanding.    -Pain Contract-4/18/2023  -UDS- 09/17/2024: Results pending   Collected 7/23/ 2024- Positive for Norco, Marijuana. Did not show prescribed Xanax

## 2024-09-20 DIAGNOSIS — C79.81 METASTATIC MALIGNANT NEOPLASM TO BREAST: Primary | ICD-10-CM

## 2024-09-20 DIAGNOSIS — Z17.0 MALIGNANT NEOPLASM OF OVERLAPPING SITES OF RIGHT BREAST IN FEMALE, ESTROGEN RECEPTOR POSITIVE: ICD-10-CM

## 2024-09-20 DIAGNOSIS — F32.A ANXIETY AND DEPRESSION: ICD-10-CM

## 2024-09-20 DIAGNOSIS — G89.3 NEOPLASM RELATED PAIN: ICD-10-CM

## 2024-09-20 DIAGNOSIS — F41.9 ANXIETY AND DEPRESSION: ICD-10-CM

## 2024-09-20 DIAGNOSIS — S92.901A UNSPECIFIED FRACTURE OF RIGHT FOOT, INITIAL ENCOUNTER FOR CLOSED FRACTURE: ICD-10-CM

## 2024-09-20 DIAGNOSIS — C50.811 MALIGNANT NEOPLASM OF OVERLAPPING SITES OF RIGHT BREAST IN FEMALE, ESTROGEN RECEPTOR POSITIVE: ICD-10-CM

## 2024-09-20 RX ORDER — HYDROCODONE BITARTRATE AND ACETAMINOPHEN 10; 325 MG/1; MG/1
1 TABLET ORAL EVERY 6 HOURS PRN
Qty: 120 TABLET | Refills: 0 | OUTPATIENT
Start: 2024-09-20 | End: 2024-10-20

## 2024-09-20 RX ORDER — ALPRAZOLAM 0.5 MG/1
0.5 TABLET ORAL 2 TIMES DAILY PRN
Qty: 60 TABLET | Refills: 0 | Status: SHIPPED | OUTPATIENT
Start: 2024-09-20 | End: 2024-10-20

## 2024-09-23 DIAGNOSIS — G89.3 NEOPLASM RELATED PAIN: ICD-10-CM

## 2024-09-23 DIAGNOSIS — Z17.0 MALIGNANT NEOPLASM OF OVERLAPPING SITES OF RIGHT BREAST IN FEMALE, ESTROGEN RECEPTOR POSITIVE: ICD-10-CM

## 2024-09-23 DIAGNOSIS — C79.81 METASTATIC MALIGNANT NEOPLASM TO BREAST: Primary | ICD-10-CM

## 2024-09-23 DIAGNOSIS — S92.901A UNSPECIFIED FRACTURE OF RIGHT FOOT, INITIAL ENCOUNTER FOR CLOSED FRACTURE: ICD-10-CM

## 2024-09-23 DIAGNOSIS — C50.811 MALIGNANT NEOPLASM OF OVERLAPPING SITES OF RIGHT BREAST IN FEMALE, ESTROGEN RECEPTOR POSITIVE: ICD-10-CM

## 2024-09-23 RX ORDER — HYDROCODONE BITARTRATE AND ACETAMINOPHEN 10; 325 MG/1; MG/1
1 TABLET ORAL EVERY 6 HOURS PRN
Qty: 90 TABLET | Refills: 0 | Status: SHIPPED | OUTPATIENT
Start: 2024-09-23 | End: 2024-10-23

## 2024-09-23 RX ORDER — PNV NO.95/FERROUS FUM/FOLIC AC 28MG-0.8MG
1000 TABLET ORAL DAILY
Qty: 30 CAPSULE | Refills: 11 | Status: SHIPPED | OUTPATIENT
Start: 2024-09-23

## 2024-10-11 ENCOUNTER — PATIENT MESSAGE (OUTPATIENT)
Dept: ADMINISTRATIVE | Facility: OTHER | Age: 54
End: 2024-10-11
Payer: MEDICAID

## 2024-10-21 DIAGNOSIS — F41.9 ANXIETY AND DEPRESSION: ICD-10-CM

## 2024-10-21 DIAGNOSIS — F32.A ANXIETY AND DEPRESSION: ICD-10-CM

## 2024-10-21 DIAGNOSIS — C79.81 METASTATIC MALIGNANT NEOPLASM TO BREAST: Primary | ICD-10-CM

## 2024-10-21 RX ORDER — ALPRAZOLAM 0.5 MG/1
0.5 TABLET ORAL 2 TIMES DAILY PRN
Qty: 60 TABLET | Refills: 0 | Status: SHIPPED | OUTPATIENT
Start: 2024-10-21 | End: 2024-11-20

## 2024-10-23 DIAGNOSIS — C50.811 MALIGNANT NEOPLASM OF OVERLAPPING SITES OF RIGHT BREAST IN FEMALE, ESTROGEN RECEPTOR POSITIVE: ICD-10-CM

## 2024-10-23 DIAGNOSIS — C79.81 METASTATIC MALIGNANT NEOPLASM TO BREAST: Primary | ICD-10-CM

## 2024-10-23 DIAGNOSIS — G89.3 NEOPLASM RELATED PAIN: ICD-10-CM

## 2024-10-23 DIAGNOSIS — Z17.0 MALIGNANT NEOPLASM OF OVERLAPPING SITES OF RIGHT BREAST IN FEMALE, ESTROGEN RECEPTOR POSITIVE: ICD-10-CM

## 2024-10-23 DIAGNOSIS — S92.901A UNSPECIFIED FRACTURE OF RIGHT FOOT, INITIAL ENCOUNTER FOR CLOSED FRACTURE: ICD-10-CM

## 2024-10-24 DIAGNOSIS — C79.81 METASTATIC MALIGNANT NEOPLASM TO BREAST: ICD-10-CM

## 2024-10-24 DIAGNOSIS — F41.9 ANXIETY AND DEPRESSION: ICD-10-CM

## 2024-10-24 DIAGNOSIS — F32.A ANXIETY AND DEPRESSION: ICD-10-CM

## 2024-10-24 RX ORDER — HYDROCODONE BITARTRATE AND ACETAMINOPHEN 10; 325 MG/1; MG/1
1 TABLET ORAL EVERY 6 HOURS PRN
Qty: 90 TABLET | Refills: 0 | Status: SHIPPED | OUTPATIENT
Start: 2024-10-24 | End: 2024-11-23

## 2024-10-24 RX ORDER — DULOXETIN HYDROCHLORIDE 60 MG/1
60 CAPSULE, DELAYED RELEASE ORAL
Qty: 30 CAPSULE | Refills: 0 | Status: SHIPPED | OUTPATIENT
Start: 2024-10-24

## 2024-11-08 ENCOUNTER — HOSPITAL ENCOUNTER (EMERGENCY)
Facility: HOSPITAL | Age: 54
Discharge: LEFT BEFORE DISCHARGE | End: 2024-11-08
Payer: MEDICAID

## 2024-11-08 VITALS
HEART RATE: 95 BPM | WEIGHT: 235 LBS | DIASTOLIC BLOOD PRESSURE: 85 MMHG | RESPIRATION RATE: 24 BRPM | BODY MASS INDEX: 36.81 KG/M2 | TEMPERATURE: 98 F | SYSTOLIC BLOOD PRESSURE: 113 MMHG | OXYGEN SATURATION: 95 %

## 2024-11-08 DIAGNOSIS — R07.81 RIB PAIN: ICD-10-CM

## 2024-11-08 LAB
ALBUMIN SERPL BCP-MCNC: 3.8 G/DL (ref 3.5–5.2)
ALP SERPL-CCNC: 92 U/L (ref 40–150)
ALT SERPL W/O P-5'-P-CCNC: 52 U/L (ref 10–44)
ANION GAP SERPL CALC-SCNC: 13 MMOL/L (ref 8–16)
AST SERPL-CCNC: 44 U/L (ref 10–40)
BACTERIA #/AREA URNS HPF: ABNORMAL /HPF
BASOPHILS # BLD AUTO: 0.11 K/UL (ref 0–0.2)
BASOPHILS NFR BLD: 1 % (ref 0–1.9)
BILIRUB SERPL-MCNC: 0.4 MG/DL (ref 0.1–1)
BILIRUB UR QL STRIP: NEGATIVE
BUN SERPL-MCNC: 19 MG/DL (ref 6–20)
CALCIUM SERPL-MCNC: 9.3 MG/DL (ref 8.7–10.5)
CHLORIDE SERPL-SCNC: 108 MMOL/L (ref 95–110)
CLARITY UR: CLEAR
CO2 SERPL-SCNC: 18 MMOL/L (ref 23–29)
COLOR UR: YELLOW
CREAT SERPL-MCNC: 1.8 MG/DL (ref 0.5–1.4)
DIFFERENTIAL METHOD BLD: ABNORMAL
EOSINOPHIL # BLD AUTO: 0.4 K/UL (ref 0–0.5)
EOSINOPHIL NFR BLD: 3.5 % (ref 0–8)
ERYTHROCYTE [DISTWIDTH] IN BLOOD BY AUTOMATED COUNT: 14.3 % (ref 11.5–14.5)
EST. GFR  (NO RACE VARIABLE): 33 ML/MIN/1.73 M^2
GLUCOSE SERPL-MCNC: 116 MG/DL (ref 70–110)
GLUCOSE UR QL STRIP: NEGATIVE
HCT VFR BLD AUTO: 39.5 % (ref 37–48.5)
HGB BLD-MCNC: 13.4 G/DL (ref 12–16)
HGB UR QL STRIP: ABNORMAL
IMM GRANULOCYTES # BLD AUTO: 0.25 K/UL (ref 0–0.04)
IMM GRANULOCYTES NFR BLD AUTO: 2.3 % (ref 0–0.5)
KETONES UR QL STRIP: NEGATIVE
LEUKOCYTE ESTERASE UR QL STRIP: NEGATIVE
LIPASE SERPL-CCNC: 50 U/L (ref 4–60)
LYMPHOCYTES # BLD AUTO: 3.3 K/UL (ref 1–4.8)
LYMPHOCYTES NFR BLD: 29.9 % (ref 18–48)
MCH RBC QN AUTO: 34.4 PG (ref 27–31)
MCHC RBC AUTO-ENTMCNC: 33.9 G/DL (ref 32–36)
MCV RBC AUTO: 102 FL (ref 82–98)
MICROSCOPIC COMMENT: ABNORMAL
MONOCYTES # BLD AUTO: 0.8 K/UL (ref 0.3–1)
MONOCYTES NFR BLD: 7 % (ref 4–15)
NEUTROPHILS # BLD AUTO: 6.2 K/UL (ref 1.8–7.7)
NEUTROPHILS NFR BLD: 56.3 % (ref 38–73)
NITRITE UR QL STRIP: NEGATIVE
NRBC BLD-RTO: 0 /100 WBC
PH UR STRIP: 5 [PH] (ref 5–8)
PLATELET # BLD AUTO: 247 K/UL (ref 150–450)
PMV BLD AUTO: 11.6 FL (ref 9.2–12.9)
POTASSIUM SERPL-SCNC: 4.5 MMOL/L (ref 3.5–5.1)
PROT SERPL-MCNC: 7.6 G/DL (ref 6–8.4)
PROT UR QL STRIP: ABNORMAL
RBC # BLD AUTO: 3.89 M/UL (ref 4–5.4)
RBC #/AREA URNS HPF: 5 /HPF (ref 0–4)
SODIUM SERPL-SCNC: 139 MMOL/L (ref 136–145)
SP GR UR STRIP: 1.02 (ref 1–1.03)
SQUAMOUS #/AREA URNS HPF: 1 /HPF
TROPONIN I SERPL DL<=0.01 NG/ML-MCNC: <0.006 NG/ML (ref 0–0.03)
URN SPEC COLLECT METH UR: ABNORMAL
UROBILINOGEN UR STRIP-ACNC: NEGATIVE EU/DL
WBC # BLD AUTO: 11.07 K/UL (ref 3.9–12.7)
WBC #/AREA URNS HPF: 2 /HPF (ref 0–5)

## 2024-11-08 PROCEDURE — 93005 ELECTROCARDIOGRAM TRACING: CPT

## 2024-11-08 PROCEDURE — 85025 COMPLETE CBC W/AUTO DIFF WBC: CPT | Performed by: PHYSICIAN ASSISTANT

## 2024-11-08 PROCEDURE — 99284 EMERGENCY DEPT VISIT MOD MDM: CPT | Mod: 25

## 2024-11-08 PROCEDURE — 80053 COMPREHEN METABOLIC PANEL: CPT | Performed by: PHYSICIAN ASSISTANT

## 2024-11-08 PROCEDURE — 93010 ELECTROCARDIOGRAM REPORT: CPT | Mod: ,,, | Performed by: INTERNAL MEDICINE

## 2024-11-08 PROCEDURE — 83690 ASSAY OF LIPASE: CPT | Performed by: PHYSICIAN ASSISTANT

## 2024-11-08 PROCEDURE — 84484 ASSAY OF TROPONIN QUANT: CPT | Performed by: PHYSICIAN ASSISTANT

## 2024-11-08 PROCEDURE — 81000 URINALYSIS NONAUTO W/SCOPE: CPT | Performed by: PHYSICIAN ASSISTANT

## 2024-11-09 LAB
OHS QRS DURATION: 78 MS
OHS QTC CALCULATION: 446 MS

## 2024-11-19 ENCOUNTER — INFUSION (OUTPATIENT)
Dept: INFUSION THERAPY | Facility: HOSPITAL | Age: 54
End: 2024-11-19
Attending: INTERNAL MEDICINE
Payer: MEDICAID

## 2024-11-19 ENCOUNTER — LAB VISIT (OUTPATIENT)
Dept: LAB | Facility: HOSPITAL | Age: 54
End: 2024-11-19
Payer: MEDICAID

## 2024-11-19 ENCOUNTER — OFFICE VISIT (OUTPATIENT)
Dept: PALLIATIVE MEDICINE | Facility: CLINIC | Age: 54
End: 2024-11-19
Payer: MEDICAID

## 2024-11-19 VITALS
HEART RATE: 100 BPM | WEIGHT: 237.44 LBS | OXYGEN SATURATION: 98 % | HEIGHT: 67 IN | BODY MASS INDEX: 37.27 KG/M2 | TEMPERATURE: 98 F | DIASTOLIC BLOOD PRESSURE: 76 MMHG | SYSTOLIC BLOOD PRESSURE: 110 MMHG | RESPIRATION RATE: 16 BRPM

## 2024-11-19 DIAGNOSIS — C77.3 MALIGNANT NEOPLASM METASTATIC TO LYMPH NODE OF AXILLA: ICD-10-CM

## 2024-11-19 DIAGNOSIS — G89.3 NEOPLASM RELATED PAIN: ICD-10-CM

## 2024-11-19 DIAGNOSIS — F41.9 ANXIETY AND DEPRESSION: ICD-10-CM

## 2024-11-19 DIAGNOSIS — C79.81 METASTATIC MALIGNANT NEOPLASM TO BREAST: ICD-10-CM

## 2024-11-19 DIAGNOSIS — Z17.0 MALIGNANT NEOPLASM OF OVERLAPPING SITES OF RIGHT BREAST IN FEMALE, ESTROGEN RECEPTOR POSITIVE: Primary | ICD-10-CM

## 2024-11-19 DIAGNOSIS — C50.919 PRIMARY MALIGNANT NEOPLASM OF BREAST WITH METASTASIS TO OTHER SITE, UNSPECIFIED LATERALITY: ICD-10-CM

## 2024-11-19 DIAGNOSIS — Z17.0 MALIGNANT NEOPLASM OF OVERLAPPING SITES OF RIGHT BREAST IN FEMALE, ESTROGEN RECEPTOR POSITIVE: ICD-10-CM

## 2024-11-19 DIAGNOSIS — C50.811 MALIGNANT NEOPLASM OF OVERLAPPING SITES OF RIGHT BREAST IN FEMALE, ESTROGEN RECEPTOR POSITIVE: Primary | ICD-10-CM

## 2024-11-19 DIAGNOSIS — Z51.5 PALLIATIVE CARE ENCOUNTER: ICD-10-CM

## 2024-11-19 DIAGNOSIS — C50.811 MALIGNANT NEOPLASM OF OVERLAPPING SITES OF RIGHT BREAST IN FEMALE, ESTROGEN RECEPTOR POSITIVE: ICD-10-CM

## 2024-11-19 DIAGNOSIS — F32.A ANXIETY AND DEPRESSION: ICD-10-CM

## 2024-11-19 DIAGNOSIS — C79.81 METASTATIC MALIGNANT NEOPLASM TO BREAST: Primary | ICD-10-CM

## 2024-11-19 LAB
ALBUMIN SERPL BCP-MCNC: 3.7 G/DL (ref 3.5–5.2)
ALP SERPL-CCNC: 86 U/L (ref 40–150)
ALT SERPL W/O P-5'-P-CCNC: 37 U/L (ref 10–44)
ANION GAP SERPL CALC-SCNC: 10 MMOL/L (ref 8–16)
AST SERPL-CCNC: 28 U/L (ref 10–40)
BILIRUB SERPL-MCNC: 0.3 MG/DL (ref 0.1–1)
BUN SERPL-MCNC: 11 MG/DL (ref 6–20)
CALCIUM SERPL-MCNC: 9.7 MG/DL (ref 8.7–10.5)
CHLORIDE SERPL-SCNC: 106 MMOL/L (ref 95–110)
CO2 SERPL-SCNC: 25 MMOL/L (ref 23–29)
CREAT SERPL-MCNC: 1.6 MG/DL (ref 0.5–1.4)
EST. GFR  (NO RACE VARIABLE): 37.6 ML/MIN/1.73 M^2
GLUCOSE SERPL-MCNC: 112 MG/DL (ref 70–110)
POTASSIUM SERPL-SCNC: 4.1 MMOL/L (ref 3.5–5.1)
PROT SERPL-MCNC: 7 G/DL (ref 6–8.4)
SODIUM SERPL-SCNC: 141 MMOL/L (ref 136–145)

## 2024-11-19 PROCEDURE — 36593 DECLOT VASCULAR DEVICE: CPT

## 2024-11-19 PROCEDURE — 36415 COLL VENOUS BLD VENIPUNCTURE: CPT

## 2024-11-19 PROCEDURE — 99214 OFFICE O/P EST MOD 30 MIN: CPT | Mod: PBBFAC,25

## 2024-11-19 PROCEDURE — 63600175 PHARM REV CODE 636 W HCPCS

## 2024-11-19 PROCEDURE — 63600175 PHARM REV CODE 636 W HCPCS: Mod: JZ,JG

## 2024-11-19 PROCEDURE — 96523 IRRIG DRUG DELIVERY DEVICE: CPT

## 2024-11-19 PROCEDURE — 99999 PR PBB SHADOW E&M-EST. PATIENT-LVL IV: CPT | Mod: PBBFAC,,,

## 2024-11-19 PROCEDURE — 80053 COMPREHEN METABOLIC PANEL: CPT

## 2024-11-19 RX ORDER — SODIUM CHLORIDE 0.9 % (FLUSH) 0.9 %
10 SYRINGE (ML) INJECTION
OUTPATIENT
Start: 2024-11-19

## 2024-11-19 RX ORDER — HEPARIN 100 UNIT/ML
500 SYRINGE INTRAVENOUS
OUTPATIENT
Start: 2024-11-19

## 2024-11-19 RX ORDER — HEPARIN 100 UNIT/ML
500 SYRINGE INTRAVENOUS
Status: DISCONTINUED | OUTPATIENT
Start: 2024-11-19 | End: 2024-11-19 | Stop reason: HOSPADM

## 2024-11-19 RX ORDER — SODIUM CHLORIDE 0.9 % (FLUSH) 0.9 %
10 SYRINGE (ML) INJECTION
Status: DISCONTINUED | OUTPATIENT
Start: 2024-11-19 | End: 2024-11-19 | Stop reason: HOSPADM

## 2024-11-19 RX ADMIN — HEPARIN 500 UNITS: 100 SYRINGE at 03:11

## 2024-11-19 RX ADMIN — ALTEPLASE 2 MG: 2.2 INJECTION, POWDER, LYOPHILIZED, FOR SOLUTION INTRAVENOUS at 02:11

## 2024-11-19 NOTE — NURSING
"Pt here for Mediport Flush. Right  chestwall mediport accessed with a 20g 1" tomlin via sterile technique.  Excellent blood return noted after one Activase 2mg IVP instilled.  Flushed with 10ml NS and 5 ml heparin solution.  Needle D/C, site without redness, swelling, or drainage noted.  Dressing applied.  Patient tolerated well.  Patient to return to clinic in 8 weeks.     "

## 2024-11-19 NOTE — PROGRESS NOTES
Palliative Medicine Clinic Note  Follow-up        Consult Requested By: Dr. Ruff      Reason for Consult: Symptom  Management    Chief Complaint: Right ankle/foot fracture/pain and decreased mobility.           ASSESSMENT/PLAN:      Plan/Recommendations:      1. Metastatic malignant neoplasm to breast  -     COMPREHENSIVE METABOLIC PANEL; Future; Expected date: 11/19/2024    2. Neoplasm related pain  Overview:  Begin weaning MSER back down to 30mg BID since disease is improving and pain is too    Continue hydrocodone 10mg QID PRN    Pain contract- 9/1/22  UDS collected- 10/6/22  Narcan rx- 9/1/22      Assessment & Plan:  Neoplasm related pain  Uncontrolled- Due to persistent bilateral rib pain/pelvic pain- Unclear if this is all cancer-related    -Norco 10mg TID PRN  -I informed pt that Dr. Joseph informed me via staff message that he has reviewed her scans and there is no evidence of disease progression.   -Post right ankle fixation on June 13, 2024.   -Temporarily suspend opioid weaning due to persistent rib pain.   -Patient instructed to contact me if 3 day supply of meds left.   -Narcan RX given and explained use to pt and family. Pt and family verbalized understanding.    -Pain Contract-4/18/2023  -UDS- 09/17/2024: Consistent with prescribed Norco and Xanax. Marijuana noted. Pt unable to obtain medical marijuana card due to cost.         Orders:  -     COMPREHENSIVE METABOLIC PANEL; Future; Expected date: 11/19/2024    3. Anxiety and depression  Assessment & Plan:  Anxiety/Depression  Related to fear of disease progression, financial stress, and recent injury   I informed pt that Dr. Joseph informed me via staff message that he has reviewed her scans and there is no evidence of disease progression.   Xanax 0.5mg BID PRN. Pt informed no early refills if she takes TID.   Continue Duloxetine 60mg daily. Crcl 66.75 mL/min per UPTODATE  Pt declined mental health counseling referral at this time  Pt has received  financial assistance in the past with  support          4. Malignant neoplasm of overlapping sites of right breast in female, estrogen receptor positive  Assessment & Plan:  -Followed by Dr. Ruff. Previously Dr. Joseph, Dr. Asencio  -On Surveillance- Continues Letrozole and Ibrance  -I informed pt that Dr. Joseph informed me via staff message that he has reviewed her scans and there is no evidence of disease progression.   CT A/P: 10/27/2024: No acute intraabdominal abnormality.   Agree with Virtual Radiologist report provided on call.   CT C/A/P: 08/14/2024: 1. I see no evidence to suggest metastatic disease in the chest, abdomen nor pelvis  2.  Emphysema is present; it is an independent risk factor for lung cancer. Recommend patient be evaluated for low dose cancer screening protocol.  3.  Stable right upper lobe pulmonary nodule as described above  4.  Coronary artery calcifications  5.  Fatty liver  6.  Left renal simple cyst (no further follow-up nor evaluation recommended for this isolated abnormality)        5. Palliative care encounter  Overview:  DNR; LaPOST completed and uploaded in EMR    Surrogate:  David Acevedo, sister, at [phone]. She will bring copy of HCPOA at next visit. Unfortunately this has not been scanned in when she brought it in the past.      Assessment & Plan:  -Code status: DNR w/Selective treatment. LaPOST 01/6/2022.   -HCPOA: Sister: David Acevedo: [phone]   -GOC:  Continue cancer treatment, symptom management, maintain independence and functional status.   -See past visit notes for further details            Advance Care Planning   Advance Directives:   Living Will: Yes        Copy on chart: Yes    LaPOST: Yes    Do Not Resuscitate Status: Yes    Medical Power of : Yes    Agent's Name:  Sister: David Acevedo   Agent's Contact Number:  [phone]    Decision Making:  Patient answered questions  Goals of Care: The patient endorses that what is most important right  now is to focus on remaining as independent as possible, symptom/pain control, and quality of life, even if it means sacrificing a little time    Accordingly, we have decided that the best plan to meet the patient's goals includes continuing with treatment.        Follow up: 2 months     Plan discussed with Patient and spouse.     SUBJECTIVE:      History of Present Illness / Interval History:  Josey Flores is a 52 y.o. female with history of metastatic breast cancer, thyroid cancer post thyroidectomy (2017), and cervical cancer (2017). She is on surveillance on Letrozole and Ibrance.  S/p stroke with left-sided weakness (June 02, 2023).   Right foot fracture 05/16/2024- Post fixation June 13, 2024- Dr. Prince ([facility]).   She is followed by Dr. Ruff.  Presents to Palliative Care Clinic for pain and anxiety.   Please see previous palliative care notes for more details on pt's care so far.       11/19/24:  History obtained from patient and medical record.   Pt attended clinic with her , Mr. Mccallum. She was in no acute distress. Tachycardic- 100R . Asymptomatic on room air.   She reported progressive bilateral rib and pelvic pain managed with Norco 10mg TID PRN. She also completed 10-day abx course for UTI diagnosed on 10/27/2024.   Anxiety has gotten worse in the past 2 weeks after [facility] staff told her she has progressive osseous mets. She is taking Xanax 0.5mg BID-TID PRN. She takes Duloxetine intermittently. She is also smoking more cigarettes and eating more to cope with stress.  She requested consult with Dr. Ruff to review her scans.   I informed pt that Dr. Joseph informed me via staff message that he has reviewed her scans and there is no evidence of disease progression.   Right leg fracture has healed. She is now full weight-bearing.       Past visits:     09/17/2024: History obtained from patient and medical record.   Pt attended clinic with her , Mr. Mccallum.  No acute distress. Vital signs stable on room air.  She is now able to bear weight on right leg with relatively stable. She is taking Norco 10mg TID PRN  Abdominal pain and diarrhea post -meal somewhat managed with imodium/lomotil.   No f/u with GI after she was told her colonoscopy showed no evidence of abnormality.   She continues to smoke 1-2ppd to cope with anxiety and stress. She wishes to return to smoking cessation counseling and mental health counseling.     08/15/2024: History obtained from patient and medical record.   Pt attended clinic with her , Mr. Mccallum. Seen sitting in wheelchair. No acute distress. Vital signs stable on room air.  Right leg pain stable on Norco 10mg BID-TID PRN. Able to complete light active ROM exercises/some weight bearing. She continues to follow with Dr. Prince ( Orthopedics)  Persistent anxiety depression due to financial stress, recent right leg injury, and weight gain (she reported eating more to manage stress). She is also taking Xanax TID (prescribed (BID) and Duloxetine.  We have discussed the importance of taking prescribed Xanax as prescribed. She wants to try cancer patient peer mentoring program. She stated Dr. Ruff will assist with this.   Discussed pt's UDS result on 7/23 positive for Marijuana, but not prescribed Xanax. Pt stated she is using Marijuana for pain and anxiety. She is unable to obtain medical marijuana card due to cost. She states she is using Xanax, but unsure why it did not show in UDS. We have discussed the risk of using unprescribed Marijuana including unintentional substance overdose due to Marijuana contamination with other substances (opioids, cocaine, etc). .     07/23/2024: History obtained from patient and medical record.   Pt attended clinic with her , Mr. Mccallum. Seen sitting in wheelchair. No acute distress. Vital signs stable on room air.  Right leg pain stable on Norco 10mg TID PRN. Able to complete light active ROM  exercises. Next f/u visit w/ Dr. Prince (Ortho) July 30th, 2024.   Persistent anxiety depression due to financial stress and recent right leg injury. She is unsure if she has Duloxetine at home. She takes Xanax  more often.   We have discussed the importance of taking Duloxetine to manage her mood, instead of taking Xanax alone.   Colonoscopy scheduled for Aug 05, 2024 per GI (Dr. Beckman) recommendations.      6/25/2024: Pt attended clinic with her , Mr. Mccallum. Seen sitting in wheelchair. She appeared uncomfortable.   She reported worsening right leg/foot pain following fixation on June 13, 2024 by Dr. Prince at [facility]. Follow-up on July 2nd.   She is taking Norco 10mg Q8H PRN with little relief.   Persistent abdominal cramping pending GI visit with Dr. Beckman on July 16, 2024.  She her mood is low due to decreased mobility post right ankle fracture and persistent pain.   She requested assistance with obtaining a wheelchair through DME. SW aware.      06/11/2024: History obtained from: Patient  Pt seen via audiovisual feed. A&O x3. Seen sitting in a car. No acute distress.   She reported persistent right ankle pain due to right foot fracture on May 16, 2024  She has been taking Norco 10mg Q6-8H PRN due to worsening pain.   She reported she is scheduled to undergo fixation of the right ankle on June 13th, 2024 by Dr. Prince at [facility].   Her mood is stable  on Duloxetine and Xanax PRN.     05/09/2024: History obtained from: Patient  and , Mr. Mccallum.   Pt attended clinic with her , Mr. Mccallum. She appeared uncomfortable.   Vital signs stable on room air.   She reported persistent lower abd/pelvic pain and bilateral ribs pain. She has been takin OTC Acetaminophen with no significant effect.   She saw Dr. Zurita (Chronic Pain). He increased Duloxetine and Lyrica, but Norco was discontinued.   She was previously on Norco 10mg QID PRN. Last 30-day refill was  Dec. 2023.   She attempted to wean to Norco 5mg TID, but has been unsuccessful.   She has been anxious and depressed due to financial stress. Last Xanax refill Jan. 2024.   No follow-up with her PCP or chronic pain since January 2024.       1/11/2024:   Pt attended clinic with her partner, Mr. Mccallum. She appeared uncomfortable. Vital signs stable on room air.   She has established care with chronic pain provider, Dr. Zurita  She reported she is now taking Lyrica and Duloxetine 60mg daily.   However, she has noted worsening abd/pelvic pain.   She is also attempting to self-wean her Norco 10mg Q6H PRN. She is now taking Norco 5mg Q6H PRN. Self weaning started 4-5 days ago   She is concerned about running out of narcotics and Dr. Zurita not willing to prescribe narcotics. I have explained to her I can not prescribe narcotics as her previous scans have shown no evidence of recurrence/metastasis.   Next CT on Saturday, Jan 13, 2024  Anxiety/Depression due to her dog's recent death.    Unsuccessful with smoking cessation due to stress    ROS:  Review of Systems   Constitutional:  Positive for appetite change (Increased due to stress) and fatigue. Negative for activity change and unexpected weight change.   HENT:  Negative for hearing loss, sore throat, trouble swallowing and voice change.    Eyes:  Negative for visual disturbance.   Respiratory:  Negative for cough, chest tightness, shortness of breath and wheezing.    Cardiovascular:  Negative for chest pain, palpitations and leg swelling.   Gastrointestinal:  Positive for abdominal pain (Intermittent lower abd cramps) and diarrhea. Negative for blood in stool, constipation, nausea, rectal pain and vomiting.   Genitourinary:  Positive for pelvic pain. Negative for bladder incontinence, difficulty urinating, flank pain, hematuria, nocturia and urgency.   Musculoskeletal:  Positive for arthralgias, leg pain (Right ankle/foot) and myalgias. Negative for neck pain.    Integumentary:  Positive for wound (Right leg/ankle- surgical site).   Allergic/Immunologic: Negative for environmental allergies, food allergies and immunocompromised state.   Neurological:  Positive for weakness (Right leg post fx/fixation). Negative for dizziness, tremors, numbness, headaches, memory loss and coordination difficulties.   Hematological:  Negative for adenopathy. Does not bruise/bleed easily.   Psychiatric/Behavioral:  Positive for agitation, dysphoric mood (Due to fear of disease progression) and sleep disturbance. Negative for behavioral problems, confusion, decreased concentration, self-injury and suicidal ideas. The patient is nervous/anxious (Due to fear of disease progression).        Review of Symptoms      Symptom Assessment (ESAS 0-10 Scale)  Pain:  6  Dyspnea:  7  Anxiety:  7  Nausea:  0  Depression:  7  Anorexia:  0  Fatigue:  8  Insomnia:  8  Restlessness:  8  Agitation:  8     CAM / Delirium:  Negative  Constipation:  Negative  Diarrhea:  Positive    Anxiety:  Is nervous/anxious (Due to fear of disease progression)  Constipation:  No constipation    Bowel Management Plan (BMP):  No      Comments:  Imodium/Lomotil    Pain Assessment:    Location(s): abdomen    Abdomen       Location: bilateral and anterior        Quality: Cramping        Quantity: 6/10 in intensity        Chronicity: Onset 2 week(s) ago, gradually worsening since 2 weeks         Aggravating Factors: None        Alleviating Factors: Opiates        Associated Symptoms: Myalgias    Modified Osmar Scale:  0    Performance Status:  90    ECOG Performance Status ndGndrndanddndend:nd nd2nd Living Arrangements:  Lives with spouse    Psychosocial/Cultural:   See Palliative Psychosocial Note: Yes  Lives with her significant other of 16 years: they have 3 children combined, 2 sons from previous marriage.    **Primary  to Follow**  Palliative Care  Consult: Yes            Medications:    Current Outpatient Medications:      albuterol (PROVENTIL/VENTOLIN HFA) 90 mcg/actuation inhaler, Inhale 2 puffs into the lungs every 4 (four) hours as needed for Wheezing or Shortness of Breath., Disp: 18 g, Rfl: 11    ALPRAZolam (XANAX) 0.5 MG tablet, Take 1 tablet (0.5 mg total) by mouth 2 (two) times daily as needed for Anxiety., Disp: 60 tablet, Rfl: 0    atorvastatin (LIPITOR) 40 MG tablet, Take 1 tablet (40 mg total) by mouth once daily., Disp: 90 tablet, Rfl: 3    calcium carbonate 400 mg calcium (1,000 mg) Chew, Take 2,000 mg by mouth once daily., Disp: , Rfl:     clopidogreL (PLAVIX) 75 mg tablet, Take 1 tablet (75 mg total) by mouth once daily., Disp: 30 tablet, Rfl: 11    DULoxetine (CYMBALTA) 60 MG capsule, Take 1 capsule by mouth once daily, Disp: 30 capsule, Rfl: 0    ergocalciferol (ERGOCALCIFEROL) 50,000 unit Cap, Take 1 capsule by mouth once a week, Disp: 12 capsule, Rfl: 0    ergocalciferol (VITAMIN D2) 50,000 unit Cap, Take 5,000 Units by mouth once daily at 6am. , Disp: , Rfl:     folic acid (FOLVITE) 1 MG tablet, Take 1 tablet (1 mg total) by mouth once daily., Disp: 100 tablet, Rfl: 3    HYDROcodone-acetaminophen (NORCO)  mg per tablet, Take 1 tablet by mouth every 6 (six) hours as needed for Pain (3 doses/day)., Disp: 90 tablet, Rfl: 0    hydrOXYzine HCL (ATARAX) 25 MG tablet, Take 1 tablet (25 mg total) by mouth 3 (three) times daily as needed for Itching., Disp: 90 tablet, Rfl: 1    ipratropium (ATROVENT) 42 mcg (0.06 %) nasal spray, 2 sprays by Nasal route 4 (four) times daily. As needed for rhinitis. Take in evening before bed and in the morning, Disp: 15 mL, Rfl: 11    levothyroxine (SYNTHROID) 300 MCG Tab, Take 1 tablet (300 mcg total) by mouth before breakfast., Disp: 30 tablet, Rfl: 11    multivitamin (THERAGRAN) per tablet, Take 1 tablet by mouth once daily., Disp: , Rfl:     naloxone (NARCAN) 4 mg/actuation Spry, SMARTSIG:Both Nares, Disp: , Rfl:     palbociclib (IBRANCE) 125 mg Cap, Take one capsule (125 mg)  "by mouth once daily on days 1-21 of each 28-day cycle., Disp: 21 capsule, Rfl: 11    pentoxifylline (TRENTAL) 400 mg TbSR, Take 1 tablet (400 mg total) by mouth 2 (two) times daily with meals., Disp: 60 tablet, Rfl: 11    potassium chloride SA (K-DUR,KLOR-CON) 20 MEQ tablet, Take 1 tablet (20 mEq total) by mouth once daily. Take daily for 3 days., Disp: 3 tablet, Rfl: 1    vitamin E 1000 UNIT capsule, Take 1 capsule (1,000 Units total) by mouth once daily., Disp: 30 capsule, Rfl: 11      External  database queried on 11/19/2024  by SUZIE REID :          Review of patient's allergies indicates:   Allergen Reactions    Gabapentin Swelling     Note: unilateral joint edema, unclear if due to gabapentin    Nsaids (non-steroidal anti-inflammatory drug) Other (See Comments)     Instructed not to take NSAID drugs with chemo pill.    Zometa [zoledronic acid] Other (See Comments)     Possible osteonecrosis jaw    Clindamycin Rash    Vancomycin analogues Itching           OBJECTIVE:         Physical Exam:        9/17/2024    11:39 AM 11/8/2024     1:23 PM 11/8/2024     1:24 PM   Vitals - 1 value per visit   SYSTOLIC 115 113    DIASTOLIC 65 85    Pulse 91 95    Temp 97.7 °F (36.5 °C)  97.5 °F (36.4 °C)   Resp 18 24    SPO2 98 % 95 %    Weight (lb) 235.45 235    Weight (kg) 106.8 106.595    Height 5' 7" (1.702 m)     BMI (Calculated) 36.9     Pain Score Seven            Physical Exam  Vitals and nursing note reviewed.   Constitutional:       General: She is not in acute distress.     Appearance: She is ill-appearing.   HENT:      Head: Normocephalic and atraumatic.      Nose: Nose normal. No congestion or rhinorrhea.      Mouth/Throat:      Mouth: Mucous membranes are moist.      Pharynx: Oropharynx is clear. No oropharyngeal exudate or posterior oropharyngeal erythema.   Eyes:      General: No scleral icterus.        Right eye: No discharge.         Left eye: No discharge.      Conjunctiva/sclera: " Conjunctivae normal.      Pupils: Pupils are equal, round, and reactive to light.   Cardiovascular:      Rate and Rhythm: Normal rate and regular rhythm.      Pulses: Normal pulses.      Heart sounds: Normal heart sounds. No murmur heard.     No gallop.   Pulmonary:      Effort: Pulmonary effort is normal. No respiratory distress.      Breath sounds: Normal breath sounds. No stridor. No wheezing.   Chest:      Chest wall: No tenderness.   Abdominal:      General: Bowel sounds are normal. There is no distension.      Palpations: Abdomen is soft.      Tenderness: There is abdominal tenderness.   Genitourinary:     Comments: Deferred  Musculoskeletal:         General: Tenderness (bilateral ribs) and signs of injury (Right leg) present. No swelling.      Cervical back: Normal range of motion and neck supple.      Right lower leg: No edema.      Left lower leg: No edema.      Right ankle: Tenderness present. Decreased range of motion.        Legs:       Comments: Right leg surgical site. Incision CDI. Color intact. Pt able to move toes without difficulty   Skin:     General: Skin is warm and dry.      Capillary Refill: Capillary refill takes less than 2 seconds.      Coloration: Skin is not jaundiced or pale.      Findings: No rash.   Neurological:      Mental Status: She is alert and oriented to person, place, and time.      Motor: Weakness (Right leg.) present.      Gait: Gait abnormal (Right leg injury. Now weight bearning without difficulty.).   Psychiatric:         Attention and Perception: Attention and perception normal.         Mood and Affect: Affect normal. Mood is anxious.         Speech: Speech normal.         Behavior: Behavior normal. Behavior is cooperative.         Thought Content: Thought content normal. Thought content does not include suicidal ideation. Thought content does not include suicidal plan.         Cognition and Memory: Cognition and memory normal.         Judgment: Judgment normal.            Labs:   CMP  Sodium   Date Value Ref Range Status   11/19/2024 141 136 - 145 mmol/L Final     Potassium   Date Value Ref Range Status   11/19/2024 4.1 3.5 - 5.1 mmol/L Final     Chloride   Date Value Ref Range Status   11/19/2024 106 95 - 110 mmol/L Final     CO2   Date Value Ref Range Status   11/19/2024 25 23 - 29 mmol/L Final     Glucose   Date Value Ref Range Status   11/19/2024 112 (H) 70 - 110 mg/dL Final     BUN   Date Value Ref Range Status   11/19/2024 11 6 - 20 mg/dL Final     Creatinine   Date Value Ref Range Status   11/19/2024 1.6 (H) 0.5 - 1.4 mg/dL Final     Calcium   Date Value Ref Range Status   11/19/2024 9.7 8.7 - 10.5 mg/dL Final     Total Protein   Date Value Ref Range Status   11/19/2024 7.0 6.0 - 8.4 g/dL Final     Albumin   Date Value Ref Range Status   11/19/2024 3.7 3.5 - 5.2 g/dL Final     Total Bilirubin   Date Value Ref Range Status   11/19/2024 0.3 0.1 - 1.0 mg/dL Final     Comment:     For infants and newborns, interpretation of results should be based  on gestational age, weight and in agreement with clinical  observations.    Premature Infant recommended reference ranges:  Up to 24 hours.............<8.0 mg/dL  Up to 48 hours............<12.0 mg/dL  3-5 days..................<15.0 mg/dL  6-29 days.................<15.0 mg/dL       Alkaline Phosphatase   Date Value Ref Range Status   11/19/2024 86 40 - 150 U/L Final     AST   Date Value Ref Range Status   11/19/2024 28 10 - 40 U/L Final     ALT   Date Value Ref Range Status   11/19/2024 37 10 - 44 U/L Final     Anion Gap   Date Value Ref Range Status   11/19/2024 10 8 - 16 mmol/L Final     eGFR   Date Value Ref Range Status   11/19/2024 37.6 (A) >60 mL/min/1.73 m^2 Final             Imaging: CT A/P: 10/27/2024: No acute intraabdominal abnormality.   Agree with Virtual Radiologist report provided on call.          I spent a total of 35 minutes on the day of the visit. This includes face to face time in discussion of goals of  care, symptom assessment, coordination of care and emotional support.  This also includes non-face to face time preparing to see the patient (eg, review of tests/imaging), obtaining and/or reviewing separately obtained history, documenting clinical information in the electronic or other health record, independently interpreting results and communicating results to the patient/family/caregiver, or care coordinator.         SUZIE GOTTI NP

## 2024-11-19 NOTE — PATIENT INSTRUCTIONS
We will repeat your kidney function test  Your Norco and Xanax will be refilled  We will book a visit with Dr. Joseph or Dr. Ruff to review your scans.

## 2024-11-20 ENCOUNTER — PATIENT MESSAGE (OUTPATIENT)
Dept: PALLIATIVE MEDICINE | Facility: CLINIC | Age: 54
End: 2024-11-20
Payer: MEDICAID

## 2024-11-21 DIAGNOSIS — F32.A ANXIETY AND DEPRESSION: ICD-10-CM

## 2024-11-21 DIAGNOSIS — Z17.0 MALIGNANT NEOPLASM OF OVERLAPPING SITES OF RIGHT BREAST IN FEMALE, ESTROGEN RECEPTOR POSITIVE: ICD-10-CM

## 2024-11-21 DIAGNOSIS — F41.9 ANXIETY AND DEPRESSION: ICD-10-CM

## 2024-11-21 DIAGNOSIS — G89.3 NEOPLASM RELATED PAIN: ICD-10-CM

## 2024-11-21 DIAGNOSIS — C50.811 MALIGNANT NEOPLASM OF OVERLAPPING SITES OF RIGHT BREAST IN FEMALE, ESTROGEN RECEPTOR POSITIVE: ICD-10-CM

## 2024-11-21 DIAGNOSIS — C79.81 METASTATIC MALIGNANT NEOPLASM TO BREAST: Primary | ICD-10-CM

## 2024-11-21 DIAGNOSIS — S92.901A UNSPECIFIED FRACTURE OF RIGHT FOOT, INITIAL ENCOUNTER FOR CLOSED FRACTURE: ICD-10-CM

## 2024-11-21 DIAGNOSIS — C79.81 METASTATIC MALIGNANT NEOPLASM TO BREAST: ICD-10-CM

## 2024-11-21 RX ORDER — HYDROCODONE BITARTRATE AND ACETAMINOPHEN 10; 325 MG/1; MG/1
1 TABLET ORAL EVERY 6 HOURS PRN
Qty: 90 TABLET | Refills: 0 | Status: SHIPPED | OUTPATIENT
Start: 2024-11-22 | End: 2024-12-22

## 2024-11-21 RX ORDER — HYDROCODONE BITARTRATE AND ACETAMINOPHEN 10; 325 MG/1; MG/1
1 TABLET ORAL EVERY 6 HOURS PRN
Qty: 90 TABLET | Refills: 0 | Status: CANCELLED | OUTPATIENT
Start: 2024-11-21 | End: 2024-12-21

## 2024-11-21 RX ORDER — ALPRAZOLAM 0.5 MG/1
0.5 TABLET ORAL 2 TIMES DAILY PRN
Qty: 60 TABLET | Refills: 0 | Status: SHIPPED | OUTPATIENT
Start: 2024-11-21 | End: 2024-12-21

## 2024-11-22 RX ORDER — HYDROCODONE BITARTRATE AND ACETAMINOPHEN 10; 325 MG/1; MG/1
1 TABLET ORAL EVERY 6 HOURS PRN
Qty: 90 TABLET | Refills: 0 | OUTPATIENT
Start: 2024-11-22 | End: 2024-12-22

## 2024-11-25 NOTE — ASSESSMENT & PLAN NOTE
Neoplasm related pain  Uncontrolled- Due to persistent bilateral rib pain/pelvic pain- Unclear if this is all cancer-related    -Norco 10mg TID PRN  -I informed pt that Dr. Joseph informed me via staff message that he has reviewed her scans and there is no evidence of disease progression.   -Post right ankle fixation on June 13, 2024.   -Temporarily suspend opioid weaning due to persistent rib pain.   -Patient instructed to contact me if 3 day supply of meds left.   -Narcan RX given and explained use to pt and family. Pt and family verbalized understanding.    -Pain Contract-4/18/2023  -UDS- 09/17/2024: Consistent with prescribed Norco and Xanax. Marijuana noted. Pt unable to obtain medical marijuana card due to cost.

## 2024-11-25 NOTE — ASSESSMENT & PLAN NOTE
Anxiety/Depression  Related to fear of disease progression, financial stress, and recent injury   I informed pt that Dr. Joseph informed me via staff message that he has reviewed her scans and there is no evidence of disease progression.   Xanax 0.5mg BID PRN. Pt informed no early refills if she takes TID.   Continue Duloxetine 60mg daily. Crcl 66.75 mL/min per UPTODATE  Pt declined mental health counseling referral at this time  Pt has received financial assistance in the past with  support

## 2024-11-25 NOTE — ASSESSMENT & PLAN NOTE
-Followed by Dr. Ruff. Previously Dr. Joseph, Dr. Asencio  -On Surveillance- Continues Letrozole and Ibrance  -I informed pt that Dr. Joseph informed me via staff message that he has reviewed her scans and there is no evidence of disease progression.   CT A/P: 10/27/2024: No acute intraabdominal abnormality.   Agree with Virtual Radiologist report provided on call.   CT C/A/P: 08/14/2024: 1. I see no evidence to suggest metastatic disease in the chest, abdomen nor pelvis  2.  Emphysema is present; it is an independent risk factor for lung cancer. Recommend patient be evaluated for low dose cancer screening protocol.  3.  Stable right upper lobe pulmonary nodule as described above  4.  Coronary artery calcifications  5.  Fatty liver  6.  Left renal simple cyst (no further follow-up nor evaluation recommended for this isolated abnormality)

## 2024-11-25 NOTE — ASSESSMENT & PLAN NOTE
-Code status: DNR w/Selective treatment. [facility] 01/6/2022.   -HCPOA: Sister: David Shuff: [phone]   -GOC:  Continue cancer treatment, symptom management, maintain independence and functional status.   -See past visit notes for further details

## 2024-12-13 ENCOUNTER — OFFICE VISIT (OUTPATIENT)
Dept: HEMATOLOGY/ONCOLOGY | Facility: CLINIC | Age: 54
End: 2024-12-13
Payer: MEDICAID

## 2024-12-13 ENCOUNTER — LAB VISIT (OUTPATIENT)
Dept: LAB | Facility: HOSPITAL | Age: 54
End: 2024-12-13
Attending: INTERNAL MEDICINE
Payer: MEDICAID

## 2024-12-13 VITALS
HEART RATE: 101 BPM | BODY MASS INDEX: 37.54 KG/M2 | TEMPERATURE: 97 F | OXYGEN SATURATION: 98 % | HEIGHT: 67 IN | DIASTOLIC BLOOD PRESSURE: 62 MMHG | SYSTOLIC BLOOD PRESSURE: 95 MMHG | WEIGHT: 239.19 LBS

## 2024-12-13 DIAGNOSIS — C50.919 PRIMARY MALIGNANT NEOPLASM OF BREAST WITH METASTASIS TO OTHER SITE, UNSPECIFIED LATERALITY: ICD-10-CM

## 2024-12-13 DIAGNOSIS — C73 PAPILLARY THYROID CARCINOMA: ICD-10-CM

## 2024-12-13 DIAGNOSIS — Z17.0 MALIGNANT NEOPLASM OF OVERLAPPING SITES OF RIGHT BREAST IN FEMALE, ESTROGEN RECEPTOR POSITIVE: ICD-10-CM

## 2024-12-13 DIAGNOSIS — C77.3 MALIGNANT NEOPLASM METASTATIC TO LYMPH NODE OF AXILLA: ICD-10-CM

## 2024-12-13 DIAGNOSIS — C50.811 MALIGNANT NEOPLASM OF OVERLAPPING SITES OF RIGHT BREAST IN FEMALE, ESTROGEN RECEPTOR POSITIVE: ICD-10-CM

## 2024-12-13 DIAGNOSIS — C53.0 MALIGNANT NEOPLASM OF ENDOCERVIX: Primary | ICD-10-CM

## 2024-12-13 DIAGNOSIS — Z71.9 HEALTH COUNSELING: ICD-10-CM

## 2024-12-13 PROCEDURE — 99214 OFFICE O/P EST MOD 30 MIN: CPT | Mod: PBBFAC | Performed by: INTERNAL MEDICINE

## 2024-12-13 PROCEDURE — 99999 PR PBB SHADOW E&M-EST. PATIENT-LVL IV: CPT | Mod: PBBFAC,,, | Performed by: INTERNAL MEDICINE

## 2024-12-13 NOTE — PROGRESS NOTES
[facility]  [address] [facility] [address]  Phone: [phone];  Fax: [phone]    Patient ID: Josey Flores   Chief Complaint: Follow-up  MRN:  [id number]     Oncologic Diagnosis:    - Stage IV cervical squamous cell carcinoma  - Stage IV, T2 N1b M1, invasive breast carcinoma, ER/MI+, HER2-  - Papillary thyroid carcinoma status post total thyroidectomy on 08/31/2017, mpT1b N0   - Cervical HSIL MIREILLE 3 s/p LEEP, 2017  Previous Treatment:    - Palliative chemotherapy: cisplatin, paclitaxel and bevacizumab; 02/22/2021 cycle 1 day 1 -> bevacizumab maintenance after 6 cycles  - Carboplatin, paclitaxel and pembrolizumab, C1D1 1/28/22; maintenance pembrolizumab, d/c 9/2022  Current Treatment:    - Letrozole and palbociclib   Subjective   Josey Flores is a 54 y.o. female who presents to clinic for follow up and is accompanied by her spouse.      She reports that she is overall doing better than our last visit.  Her pain is much improved; her appetite has returned. She still suffers from insomnia.  She is now gaining weight; we discussed at length dietary changes and exercise.  She also reports that her BP has been labile; it is low like today sometimes and at other times it is 180s/90s.  Will recheck hormone levels.   ED precautions given.    She has no acute complaints today.      Review of Systems   Constitutional:  Negative for activity change, appetite change, chills, diaphoresis, fatigue, fever and unexpected weight change.   Respiratory:  Negative for cough and shortness of breath.    Cardiovascular:  Negative for chest pain.   Gastrointestinal:  Negative for abdominal distention, abdominal pain, anal bleeding, blood in stool, constipation, diarrhea, nausea and vomiting.   Genitourinary:  Negative for difficulty urinating and hematuria.   Musculoskeletal:  Positive for arthralgias. Negative for back pain and myalgias.   Skin:  Negative for rash.   Neurological:   Negative for dizziness, weakness, light-headedness and headaches.   Hematological:  Does not bruise/bleed easily.   Psychiatric/Behavioral:  Positive for sleep disturbance. The patient is not nervous/anxious.      History     Oncology History   Secondary cancer of bone    Chemotherapy    Treatment Summary   Plan Name: OP GYN PACLITAXEL CISPLATIN BEVACIZUMAB Q3W  Treatment Goal: Control  Status: Inactive  Start Date: 2/22/2021  End Date: 7/7/2021  Provider: Estefani Asencio MD  Chemotherapy: CISplatin (PLATINOL) 50 mg/m2 = 108 mg in sodium chloride 0.9% 608 mL chemo infusion, 50 mg/m2 = 108 mg, Intravenous, Clinic/HOD 1 time, 6 of 6 cycles  Administration: 108 mg (2/22/2021), 108 mg (3/15/2021), 108 mg (4/6/2021), 108 mg (5/11/2021), 108 mg (6/1/2021), 108 mg (6/22/2021)    bevacizumab (AVASTIN) 15 mg/kg = 1,480 mg in sodium chloride 0.9% 100 mL chemo infusion, 15 mg/kg = 1,480 mg, Intravenous, Clinic/HOD 1 time, 6 of 6 cycles  Administration: 1,480 mg (4/6/2021), 1,480 mg (2/23/2021), 1,480 mg (3/16/2021), 1,480 mg (6/1/2021), 1,480 mg (6/22/2021), 1,480 mg (5/12/2021)    PACLitaxeL (TAXOL) 175 mg/m2 = 378 mg in sodium chloride 0.9% 563 mL chemo infusion, 175 mg/m2 = 378 mg, Intravenous, Clinic/HOD 1 time, 6 of 6 cycles  Administration: 378 mg (2/22/2021), 378 mg (3/15/2021), 378 mg (4/6/2021), 378 mg (5/11/2021), 378 mg (6/1/2021), 378 mg (6/22/2021)    Plan Name: OP BEVACIZUMAB Q3W   Treatment Goal: Maintenance  Status: Inactive  Start Date: 7/13/2021  End Date: 12/28/2021  Provider: Estefani Asencio MD  Chemotherapy: bevacizumab (AVASTIN) 1,300 mg in sodium chloride 0.9% 100 mL chemo infusion, 1,345 mg, Intravenous, Clinic/[facility] 1 time, 9 of 17 cycles  Administration: 1,300 mg (7/13/2021), 1,345 mg (8/3/2021), 1,300 mg (8/24/2021), 1,300 mg (9/14/2021), 1,345 mg (10/5/2021), 1,345 mg (10/26/2021), 1,345 mg (11/16/2021), 1,300 mg (12/7/2021), 1,300 mg (12/28/2021)    Plan Name: OP CARBOPLATIN PACLITAXEL  PEMBROLIZUMAB Q3W FOLLOWED BY MAINTENANCE PEMBROLIZUMAB 400 MG Q6W  Treatment Goal: Control  Status: Inactive  Start Date: 1/28/2022  End Date: 8/11/2022  Provider: Estefani Asencio MD  Chemotherapy: CARBOplatin (PARAPLATIN) 425 mg in sodium chloride 0.9% 292.5 mL chemo infusion, 425 mg, Intravenous, Clinic/HOD 1 time, 6 of 6 cycles  Dose modification:   (original dose 424 mg, Cycle 6),   (original dose 424 mg, Cycle 6)  Administration: 425 mg (1/28/2022), 495 mg (2/18/2022), 525 mg (6/2/2022), 525 mg (5/12/2022), 525 mg (3/11/2022), 495 mg (4/11/2022)    PACLitaxeL (TAXOL) 175 mg/m2 = 366 mg in sodium chloride 0.9% 561 mL chemo infusion, 175 mg/m2 = 366 mg (100 % of original dose 175 mg/m2), Intravenous, Clinic/HOD 1 time, 6 of 6 cycles  Dose modification: 175 mg/m2 (original dose 175 mg/m2, Cycle 1), 175 mg/m2 (original dose 175 mg/m2, Cycle 4)  Administration: 366 mg (1/28/2022), 366 mg (2/18/2022), 366 mg (3/11/2022), 366 mg (4/11/2022), 366 mg (5/12/2022), 366 mg (6/2/2022)       Malignant neoplasm of endocervix   9/10/2020 Imaging Significant Findings    CT A/P: Negative for ascites, omental caking, lymphadenopathy, or a gross cervical mass     12/7/2020 Biopsy    EMBX: SCC, p16+     1/12/2021 Imaging Significant Findings    MRI Pelvis w/ w/o  4.9 cm cervical mass  R parametrial invasion  R external iliac lymphadenopathy     1/27/2021 Imaging Significant Findings    PET/CT: FDG avidity in the chest     2/18/2021 Biopsy    Endoscopic biopsy of the lung: +      2/22/2021 - 6/29/2021 Chemotherapy    Cisplatin/paclitaxel/avastin x6     3/12/2021 Genetic Testing    STRATA: FRANK, PIK3c, Ep300, ERBB3, KDM6A     4/24/2021 Imaging Significant Findings    CT Pelvis w/: JUAN FRANCISCO with a normal cervix     4/26/2021 Imaging Significant Findings    PET/CT: No FDG avidity     5/18/2021 Imaging Significant Findings    CT A/P w/: JUAN FRANCISCO     6/5/2021 Imaging Significant Findings    CT A/P w/: JUAN FRANCISCO     7/12/2021 Imaging Significant  Findings    PET/CT: JUAN FRANCISCO     7/13/2021 - 12/28/2021 Maintenance Therapy    Avastin x 9     10/20/2021 Imaging Significant Findings    PET/CT: JUAN FRANCISCO     1/11/2022 Imaging Significant Findings    PET/CT: Increased FDG avidity in the cervix.  No other evidence of disease.     1/28/2022 - 6/2/2022 Chemotherapy    Carboplatin/paclitaxel/pembrolizumab x 6     3/25/2022 Imaging Significant Findings    PET/CT: Persistent FDG avidity in the cervix.  New FDG avidity in the inguinal lymph nodes (although it does not look suspicious)     4/25/2022 - 4/29/2022 Radiation Therapy    Treating physician: Dr. Vázquez (MRI guided T&O)  Total Dose: 30 Gy  Fractions: 5     6/30/2022 - 8/11/2022 Maintenance Therapy    Pembrolizumab 400 mg IV x2     8/5/2022 Imaging Significant Findings    PET/CT: JUAN FRANCISCO     10/16/2022 Imaging Significant Findings    CT A/P w/ (Women's without imaging available): JUAN FRANCISCO     1/11/2023 Imaging Significant Findings    PET/CT: JUAN FRANCISCO      Chemotherapy    Treatment Summary   Plan Name: OP GYN PACLITAXEL CISPLATIN BEVACIZUMAB Q3W  Treatment Goal: Control  Status: Inactive  Start Date: 2/22/2021  End Date: 7/7/2021  Provider: Estefani Asencio MD  Chemotherapy: CISplatin (PLATINOL) 50 mg/m2 = 108 mg in sodium chloride 0.9% 608 mL chemo infusion, 50 mg/m2 = 108 mg, Intravenous, Clinic/HOD 1 time, 6 of 6 cycles  Administration: 108 mg (2/22/2021), 108 mg (3/15/2021), 108 mg (4/6/2021), 108 mg (5/11/2021), 108 mg (6/1/2021), 108 mg (6/22/2021)    bevacizumab (AVASTIN) 15 mg/kg = 1,480 mg in sodium chloride 0.9% 100 mL chemo infusion, 15 mg/kg = 1,480 mg, Intravenous, Clinic/HOD 1 time, 6 of 6 cycles  Administration: 1,480 mg (4/6/2021), 1,480 mg (2/23/2021), 1,480 mg (3/16/2021), 1,480 mg (6/1/2021), 1,480 mg (6/22/2021), 1,480 mg (5/12/2021)    PACLitaxeL (TAXOL) 175 mg/m2 = 378 mg in sodium chloride 0.9% 563 mL chemo infusion, 175 mg/m2 = 378 mg, Intravenous, [facility] 1 time, 6 of 6 cycles  Administration: 378 mg  (2/22/2021), 378 mg (3/15/2021), 378 mg (4/6/2021), 378 mg (5/11/2021), 378 mg (6/1/2021), 378 mg (6/22/2021)    Plan Name: OP BEVACIZUMAB Q3W   Treatment Goal: Maintenance  Status: Inactive  Start Date: 7/13/2021  End Date: 12/28/2021  Provider: Estefani Asencio MD  Chemotherapy: bevacizumab (AVASTIN) 1,300 mg in sodium chloride 0.9% 100 mL chemo infusion, 1,345 mg, Intravenous, Clinic/HOD 1 time, 9 of 17 cycles  Administration: 1,300 mg (7/13/2021), 1,345 mg (8/3/2021), 1,300 mg (8/24/2021), 1,300 mg (9/14/2021), 1,345 mg (10/5/2021), 1,345 mg (10/26/2021), 1,345 mg (11/16/2021), 1,300 mg (12/7/2021), 1,300 mg (12/28/2021)    Plan Name: OP CARBOPLATIN PACLITAXEL PEMBROLIZUMAB Q3W FOLLOWED BY MAINTENANCE PEMBROLIZUMAB 400 MG Q6W  Treatment Goal: Control  Status: Inactive  Start Date: 1/28/2022  End Date: 8/11/2022  Provider: Estefani Asencio MD  Chemotherapy: CARBOplatin (PARAPLATIN) 425 mg in sodium chloride 0.9% 292.5 mL chemo infusion, 425 mg, Intravenous, Clinic/HOD 1 time, 6 of 6 cycles  Dose modification:   (original dose 424 mg, Cycle 6),   (original dose 424 mg, Cycle 6)  Administration: 425 mg (1/28/2022), 495 mg (2/18/2022), 525 mg (6/2/2022), 525 mg (5/12/2022), 525 mg (3/11/2022), 495 mg (4/11/2022)    PACLitaxeL (TAXOL) 175 mg/m2 = 366 mg in sodium chloride 0.9% 561 mL chemo infusion, 175 mg/m2 = 366 mg (100 % of original dose 175 mg/m2), Intravenous, Clinic/HOD 1 time, 6 of 6 cycles  Dose modification: 175 mg/m2 (original dose 175 mg/m2, Cycle 1), 175 mg/m2 (original dose 175 mg/m2, Cycle 4)  Administration: 366 mg (1/28/2022), 366 mg (2/18/2022), 366 mg (3/11/2022), 366 mg (4/11/2022), 366 mg (5/12/2022), 366 mg (6/2/2022)             Past Medical History:   Diagnosis Date    Anxiety     Asthma     Breast cancer 2017    Cancer     right breast, metastatic-lymph nodes, bone, and thyroid     COPD (chronic obstructive pulmonary disease)     Depression     History of psychiatric  hospitalization     2000; suicidal ideation    Hx of psychiatric care     Hypothyroidism     Miscarriage     five miscarriages    Obesity     Psychiatric problem     Thyroid cancer 2017       Past Surgical History:   Procedure Laterality Date    ABSCESS DRAINAGE      bilateral axilla    ADENOIDECTOMY      APPENDECTOMY      BREAST BIOPSY Right     x3    CHOLECYSTECTOMY      COLONOSCOPY N/A 8/5/2024    Procedure: COLONOSCOPY cc/bt done on 7/22/24 Dr. Eckert;  Surgeon: Doreen Beckman MD;  Location: [facility] ENDO;  Service: Endoscopy;  Laterality: N/A;    ENDOBRONCHIAL ULTRASOUND Bilateral 02/18/2021    Procedure: ENDOBRONCHIAL ULTRASOUND (EBUS);  Surgeon: Jaron Ni MD;  Location: [facility] ENDO;  Service: Pulmonary;  Laterality: Bilateral;    FLUOROSCOPY N/A 01/28/2021    Procedure: Mediport placement;  Surgeon: Noah Phelan MD;  Location: [facility] CATH LAB;  Service: General;  Laterality: N/A;    MASS EXCISION      MEDIPORT INSERTION, SINGLE Right 02/2021    SKIN GRAFT  06/16/2017    THYROIDECTOMY Bilateral     TONSILLECTOMY         Family History   Problem Relation Name Age of Onset    Arthritis Mother Beatrice     Early death Mother Baetrice         64 at time of death    Heart attack Mother Beatrice     Heart disease Mother Beatrice     Heart failure Mother Beatrice     Hypertension Mother Beatrice     Coronary artery disease Father David     Hearing loss Father David     Heart disease Father David     Hyperlipidemia Father David     Hypertension Father David     Mental illness Father David     Learning disabilities Son Keyur     Cancer Maternal Aunt         Review of patient's allergies indicates:   Allergen Reactions    Gabapentin Swelling     Note: unilateral joint edema, unclear if due to gabapentin    Nsaids (non-steroidal anti-inflammatory drug) Other (See Comments)     Instructed not to take NSAID drugs with chemo pill.    Zometa [zoledronic acid] Other (See  Comments)     Possible osteonecrosis jaw    Clindamycin Rash    Vancomycin analogues Itching       Social History     Tobacco Use    Smoking status: Every Day     Current packs/day: 1.00     Average packs/day: 1 pack/day for 39.9 years (39.9 ttl pk-yrs)     Types: Cigarettes     Start date: 1/1/1985     Passive exposure: Current    Smokeless tobacco: Never    Tobacco comments:     45 pack year history   Substance Use Topics    Alcohol use: No    Drug use: No       Physical Exam     ECOG SCORE    0 - Fully active-able to carry on all pre-disease performance without restriction         Vitals:    12/13/24 1458   BP: 95/62   Pulse: 101   Temp: 97.4 °F (36.3 °C)       Physical Exam  Constitutional:       General: She is not in acute distress.     Appearance: Normal appearance.   HENT:      Head: Normocephalic and atraumatic.   Eyes:      Extraocular Movements: Extraocular movements intact.      Conjunctiva/sclera: Conjunctivae normal.   Cardiovascular:      Rate and Rhythm: Normal rate.   Pulmonary:      Effort: Pulmonary effort is normal. No respiratory distress.   Abdominal:      General: There is no distension.      Palpations: Abdomen is soft. There is no hepatomegaly or splenomegaly.      Tenderness: There is no abdominal tenderness. There is no guarding.   Skin:     Findings: No rash.   Neurological:      General: No focal deficit present.      Mental Status: She is alert.   Psychiatric:         Mood and Affect: Mood normal.         Behavior: Behavior normal.         Thought Content: Thought content normal.             Imaging     CT CHEST ABDOMEN PELVIS WITH IV CONTRAST (XPD) - 08/14/2024     CLINICAL INDICATION: Metastatic disease evaluation     TECHNIQUE: Axial and multiplanar 2-D reformations provided.  With IV contrast     PRIORS: April 2024     FINDINGS:  1.  Emphysema  2.  Right upper lobe pulmonary nodule (series 2/40) stable  3.  Right upper lobe calcified granuloma with right hilar, mediastinal  "and splenic calcifications all consistent with old granulomatous disease, stable  4.  Early coronary artery calcifications  5.  Hepatomegaly with probable fatty infiltration  6.  Cholecystectomy clips without complication  7.  Inferior left renal 18 mm hypodensity consistent with simple cyst, stable  8.  Generalized atherosclerosis without aneurysm  9.  No evidence for appendicitis  10.  Diffuse thoracolumbar spondylosis without vertebral body fractures nor focal bone abnormalities        Impression:     1.  I see no evidence to suggest metastatic disease in the chest, abdomen nor pelvis  2.  Emphysema is present; it is an independent risk factor for lung cancer. Recommend patient be evaluated for low dose cancer screening protocol.  3.  Stable right upper lobe pulmonary nodule as described above  4.  Coronary artery calcifications  5.  Fatty liver  6.  Left renal simple cyst (no further follow-up nor evaluation recommended for this isolated abnormality)     All CT scans at [this location] are performed using dose modulation techniques as appropriate to a performed exam including the following:  Automated exposure control; adjustment of the mA and/or kV according to patient size (this includes techniques or standardized protocols for targeted exams where dose is matched to indication / reason for exam; i.e. extremities or head); use of iterative reconstruction technique.    Physical Exam   ECOG:   ECOG SCORE    0 - Fully active-able to carry on all pre-disease performance without restriction          Vitals:  BP 95/62 (BP Location: Right arm, Patient Position: Sitting)   Pulse 101   Temp 97.4 °F (36.3 °C) (Temporal)   Ht 5' 7" (1.702 m)   Wt 108.5 kg (239 lb 3.2 oz)   LMP 12/05/2020 Comment: no cycle x3 years  SpO2 98%   BMI 37.46 kg/m²     Physical Exam:  Physical Exam  Constitutional:       General: She is not in acute distress.     Appearance: Normal appearance.   HENT:      Head: Normocephalic and " atraumatic.   Eyes:      Extraocular Movements: Extraocular movements intact.      Conjunctiva/sclera: Conjunctivae normal.   Cardiovascular:      Rate and Rhythm: Normal rate.   Pulmonary:      Effort: Pulmonary effort is normal. No respiratory distress.   Abdominal:      General: There is no distension.      Palpations: Abdomen is soft. There is no hepatomegaly or splenomegaly.      Tenderness: There is no abdominal tenderness. There is no guarding.   Skin:     Findings: No rash.   Neurological:      General: No focal deficit present.      Mental Status: She is alert.   Psychiatric:         Mood and Affect: Mood normal.         Behavior: Behavior normal.         Thought Content: Thought content normal.        Labs   Labs:  No visits with results within 2 Day(s) from this visit.   Latest known visit with results is:   Lab Visit on 11/19/2024   Component Date Value Ref Range Status    Sodium 11/19/2024 141  136 - 145 mmol/L Final    Potassium 11/19/2024 4.1  3.5 - 5.1 mmol/L Final    Chloride 11/19/2024 106  95 - 110 mmol/L Final    CO2 11/19/2024 25  23 - 29 mmol/L Final    Glucose 11/19/2024 112 (H)  70 - 110 mg/dL Final    BUN 11/19/2024 11  6 - 20 mg/dL Final    Creatinine 11/19/2024 1.6 (H)  0.5 - 1.4 mg/dL Final    Calcium 11/19/2024 9.7  8.7 - 10.5 mg/dL Final    Total Protein 11/19/2024 7.0  6.0 - 8.4 g/dL Final    Albumin 11/19/2024 3.7  3.5 - 5.2 g/dL Final    Total Bilirubin 11/19/2024 0.3  0.1 - 1.0 mg/dL Final    Comment: For infants and newborns, interpretation of results should be based  on gestational age, weight and in agreement with clinical  observations.    Premature Infant recommended reference ranges:  Up to 24 hours.............<8.0 mg/dL  Up to 48 hours............<12.0 mg/dL  3-5 days..................<15.0 mg/dL  6-29 days.................<15.0 mg/dL      Alkaline Phosphatase 11/19/2024 86  40 - 150 U/L Final    AST 11/19/2024 28  10 - 40 U/L Final    ALT 11/19/2024 37  10 -  44 U/L Final    eGFR 11/19/2024 37.6 (A)  >60 mL/min/1.73 m^2 Final    Anion Gap 11/19/2024 10  8 - 16 mmol/L Final        Imaging   CT Chest Abdomen Without Contrast (XPD)  - 01/13/2024  Impression:  Stable exam.  7 mm right nodule, unchanged.  Calcified granuloma with calcified right hilar lymph nodes.  Fatty infiltration of the liver.  Status post cholecystectomy.         CT CHEST ABDOMEN PELVIS WITHOUT CONTRAST(XPD) - 04/15/24  COMPARISON:  01/13/2024     FINDINGS:  CT of chest without contrast:     The pulmonary window images demonstrate stable appearance of a calcified granuloma located laterally in the right upper lobe and a linear density more characteristic of localized pulmonary scarring also seen laterally in the right upper lobe (axial image 179).  Centrilobular emphysema is also visible in the upper lobes bilaterally, more severe on the right.  No pleural effusion or lung consolidation is appreciated.  No pathologic lymph node enlargement is visible in the mediastinum or axilla bilaterally.  Dense lymph node calcification associated with granulomatous disease is again noted in the mediastinum and right hilum.  Heart size is normal.  No chest wall abnormalities are appreciated except for evidence of apparent prior breast biopsy on the right.  A MediPort is also seen entering the right internal jugular vein.     CT of abdomen and pelvis without contrast:     The liver is enlarged measuring 22.7 cm in its craniocaudal dimension.  Decreased attenuation is also visible in the liver parenchyma consistent with generalized hepatic steatosis.  The spleen appears normal except for multiple incidental punctate granulomatous calcifications.  The pancreas and adrenals appear normal.  The gallbladder is surgically absent.  No parenchymal abnormality, hydronephrosis or intrarenal calculi are visible in either kidney.  No free intraperitoneal fluid or lymph node enlargement is identified.     Images obtained through  the pelvis demonstrate no gross abnormality in an incompletely distended urinary bladder.  The uterus and ovaries are surgically absent.  No free pelvic fluid or abnormal soft tissue masses are identified.     Impression:  Emphysematous changes, most severe in the right upper lobe.  Stable faint noncalcified nodular focal pulmonary scarring in the right upper lobe compared to 01/13/2024.  Hepatomegaly and generalized hepatic steatosis.  Status post cholecystectomy and hysterectomy.    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   Dec 2020 patient had vaginal bleeding and MRI pelvis showed LAD  TB discussion 01/29/21: consensus for systemic chemotherapy given advanced cervical squamous cell carcinoma with cisplatin, paclitaxel and bevacizumab with discontinuation of palbociclib but can continue aromatase inhibitor; taxane may also be active for her concomitant metastatic breast cancer.   Renetta genetic testing negative  Treated with 6C (initiated 02/21/21) of Cis/Paclitaxel/Karina with improvement of disease and started on maintenance Karina   PET-CT January 2022 notable for uptake in cervical region concerning for oligo progression/recurrence of her disease.  Previously biopsy proven metastatic cervical cancer in lungs without evidence of recurrent mass/lymphadenopathy/avidity in this region or otherwise.  Treated with chemo-immunotherapy (C1 Pembrolizumab and Carbo/Paclitaxel)  and vaginal brachy therapy(done at [facility])  Restaging scans after this showed no evidence of recurrence and decision to hold therapy made; she was resumed on  Ibrance for metastatic breast cancer  Continue with surveillance imaging       Chronic Medical Conditions  Hx of CVA June 2023  COPD  History of papillary thyroid carcinoma status post total thyroidectomy on 08/31/2017:   s/p total thyroidectomy on levothyroxine.   Hypothyroidism:   Previously on levothyroxine 275 mcg daily.    TFTs normalized   Increased Synthroid to 300 mcg daily and TFTs improved.  Continue Synthroid 300 mcg daily  Continue repeat thyroid function labs for monitoring.  Refer to endocrinology   Macrocytosis:   Possibly related to thyroid disorder will continue to monitor.  She is on vitamin-B supplement.  No new anemia.  Neuropathy:     Mild, will monitor   Tobacco abuse:    Precontemplative; continued cessation encouraged; will obtain LDCT of chest for screening purposes; discussed with patient and she is in agreement    Abdominal Cramping, Resolved  Diarrhea, Resolved        Med Onc Chart Routing      Follow up with physician 2 months.   Follow up with MIGUEL 4 weeks.   Infusion scheduling note   port flush in 2-4 weeks   Injection scheduling note    Labs CBC, CMP, phosphorus, magnesium, TSH and free T4   Scheduling:  Preferred lab:  Lab interval:     Imaging CT chest abdomen pelvis   imaging in 8 weeks   Pharmacy appointment    Other referrals              The patient was seen, interviewed and examined. Pertinent lab and radiologic studies were reviewed. Pt instructed to call should they develop concerning signs/symptoms or have further questions.        Portions of the record may have been created with voice recognition software. Occasional wrong-word or sound-a-like substitutions may have occurred due to the inherent limitations of voice recognition software. Read the chart carefully and recognize, using context, where substitutions have occurred.      Donita Nuñez MD    Hematology/Oncology

## 2024-12-13 NOTE — Clinical Note
Gabi,  Mrs. Flores is having labile blood pressure.  She likely has a new diagnosis of HTN.  She is requesting sooner follow up with you Dr. Persaud.

## 2024-12-16 ENCOUNTER — DOCUMENTATION ONLY (OUTPATIENT)
Dept: HEMATOLOGY/ONCOLOGY | Facility: CLINIC | Age: 54
End: 2024-12-16
Payer: MEDICAID

## 2024-12-16 NOTE — PROGRESS NOTES
Distress score 8 at 12/13/24 visit with Dr. Nuñez.  Score for pain, appetite loss, sleep (insomnia) anxiety/ worry addressed at MD visit.

## 2024-12-20 DIAGNOSIS — S92.901A UNSPECIFIED FRACTURE OF RIGHT FOOT, INITIAL ENCOUNTER FOR CLOSED FRACTURE: ICD-10-CM

## 2024-12-20 DIAGNOSIS — C50.811 MALIGNANT NEOPLASM OF OVERLAPPING SITES OF RIGHT BREAST IN FEMALE, ESTROGEN RECEPTOR POSITIVE: ICD-10-CM

## 2024-12-20 DIAGNOSIS — C79.81 METASTATIC MALIGNANT NEOPLASM TO BREAST: ICD-10-CM

## 2024-12-20 DIAGNOSIS — Z17.0 MALIGNANT NEOPLASM OF OVERLAPPING SITES OF RIGHT BREAST IN FEMALE, ESTROGEN RECEPTOR POSITIVE: ICD-10-CM

## 2024-12-20 DIAGNOSIS — G89.3 NEOPLASM RELATED PAIN: ICD-10-CM

## 2024-12-20 DIAGNOSIS — F32.A ANXIETY AND DEPRESSION: ICD-10-CM

## 2024-12-20 DIAGNOSIS — F41.9 ANXIETY AND DEPRESSION: ICD-10-CM

## 2024-12-20 RX ORDER — HYDROCODONE BITARTRATE AND ACETAMINOPHEN 10; 325 MG/1; MG/1
1 TABLET ORAL EVERY 6 HOURS PRN
Qty: 90 TABLET | Refills: 0 | Status: SHIPPED | OUTPATIENT
Start: 2024-12-20 | End: 2025-01-19

## 2024-12-20 RX ORDER — ALPRAZOLAM 0.5 MG/1
0.5 TABLET ORAL 2 TIMES DAILY PRN
Qty: 60 TABLET | Refills: 0 | Status: SHIPPED | OUTPATIENT
Start: 2024-12-20 | End: 2025-01-19

## 2024-12-30 ENCOUNTER — TELEPHONE (OUTPATIENT)
Dept: OPHTHALMOLOGY | Facility: CLINIC | Age: 54
End: 2024-12-30
Payer: MEDICAID

## 2024-12-30 NOTE — TELEPHONE ENCOUNTER
----- Message from Elo sent at 12/30/2024 11:21 AM CST -----  Contact: Josey  .Patient is calling to speak with the nurse regarding appt  . Reports needing to schedule appt  . Please give patient a call back at   [phone]

## 2024-12-30 NOTE — NURSING
Prolia injection given without difficulties. Bandaid applied. Patient instructed to stay near clinic for 15 minutes. Patient verbalized understanding and will notify nurse with any complaints.  
program.  Efforts were made to edit the dictations but occasionally words are mis-transcribed.)    Charleen Kelly MD (electronically signed)            Charleen Kelly MD  12/29/24 [id number]

## 2024-12-31 ENCOUNTER — OFFICE VISIT (OUTPATIENT)
Dept: GASTROENTEROLOGY | Facility: CLINIC | Age: 54
End: 2024-12-31
Payer: MEDICAID

## 2024-12-31 DIAGNOSIS — R10.9 ABDOMINAL PAIN, UNSPECIFIED ABDOMINAL LOCATION: Primary | ICD-10-CM

## 2024-12-31 DIAGNOSIS — R19.7 DIARRHEA, UNSPECIFIED TYPE: ICD-10-CM

## 2024-12-31 RX ORDER — DICYCLOMINE HYDROCHLORIDE 20 MG/1
20 TABLET ORAL 3 TIMES DAILY PRN
Qty: 90 TABLET | Refills: 11 | Status: SHIPPED | OUTPATIENT
Start: 2024-12-31

## 2024-12-31 NOTE — PROGRESS NOTES
Clinic Follow Up:  [facility] Follow Up Note    Reason for Follow Up:  The primary encounter diagnosis was Abdominal pain, unspecified abdominal location. A diagnosis of Diarrhea, unspecified type was also pertinent to this visit.    PCP: Kishore Persaud       The patient location is: [place]   The chief complaint leading to consultation is: abdominal pain     Visit type: audiovisual    Face to Face time with patient: 15 minutes   20 minutes of total time spent on the encounter, which includes face to face time and non-face to face time preparing to see the patient (eg, review of tests), Obtaining and/or reviewing separately obtained history, Documenting clinical information in the electronic or other health record, Independently interpreting results (not separately reported) and communicating results to the patient/family/caregiver, or Care coordination (not separately reported).         Each patient to whom he or she provides medical services by telemedicine is:  (1) informed of the relationship between the physician and patient and the respective role of any other health care provider with respect to management of the patient; and (2) notified that he or she may decline to receive medical services by telemedicine and may withdraw from such care at any time.    Notes:       HPI:  This is a 54 y.o. female here for follow up.   Previously saw Dr. Beckman.   Diarrhea has significantly improved.   She is having abdominal pain that is located on both sides of her upper abdomen/lower chest under breast. This feels like spasms. There is no relationship to timing of day, eating, defecation, or movement. No other abdominal pain.     Review of Systems   Constitutional:  Negative for activity change and appetite change.        As per interval history above   Respiratory:  Negative for cough and shortness of breath.    Cardiovascular:  Negative for chest pain.   Gastrointestinal:  Positive for  abdominal pain. Negative for constipation, diarrhea, nausea and vomiting.   Skin:  Negative for color change and rash.       Medical History:  Past Medical History:   Diagnosis Date    Anxiety     Asthma     Breast cancer 2017    Cancer     right breast, metastatic-lymph nodes, bone, and thyroid     COPD (chronic obstructive pulmonary disease)     Depression     History of psychiatric hospitalization     2000; suicidal ideation    Hx of psychiatric care     Hypothyroidism     Miscarriage     five miscarriages    Obesity     Psychiatric problem     Thyroid cancer 2017       Surgical History:   Past Surgical History:   Procedure Laterality Date    ABSCESS DRAINAGE      bilateral axilla    ADENOIDECTOMY      APPENDECTOMY      BREAST BIOPSY Right     x3    CHOLECYSTECTOMY      COLONOSCOPY N/A 8/5/2024    Procedure: COLONOSCOPY cc/bt done on 7/22/24 Dr. Eckert;  Surgeon: Doreen Beckman MD;  Location: [facility] ENDO;  Service: Endoscopy;  Laterality: N/A;    ENDOBRONCHIAL ULTRASOUND Bilateral 02/18/2021    Procedure: ENDOBRONCHIAL ULTRASOUND (EBUS);  Surgeon: Jaron Ni MD;  Location: [facility] ENDO;  Service: Pulmonary;  Laterality: Bilateral;    FLUOROSCOPY N/A 01/28/2021    Procedure: Mediport placement;  Surgeon: Noah Phelan MD;  Location: [facility] CATH LAB;  Service: General;  Laterality: N/A;    MASS EXCISION      MEDIPORT INSERTION, SINGLE Right 02/2021    SKIN GRAFT  06/16/2017    THYROIDECTOMY Bilateral     TONSILLECTOMY         Family History:   Family History   Problem Relation Name Age of Onset    Arthritis Mother Beatrice     Early death Mother Beatrice         64 at time of death    Heart attack Mother Beatrice     Heart disease Mother Beatrice     Heart failure Mother Beatrice     Hypertension Mother Beatrice     Coronary artery disease Father David     Hearing loss Father David     Heart disease Father David     Hyperlipidemia Father David     Hypertension Father David     Mental illness Father  David     Learning disabilities Son Keyur     Cancer Maternal Aunt         Social History:   Social History     Tobacco Use    Smoking status: Every Day     Current packs/day: 1.00     Average packs/day: 1 pack/day for 40.0 years (40.0 ttl pk-yrs)     Types: Cigarettes     Start date: 1/1/1985     Passive exposure: Current    Smokeless tobacco: Never    Tobacco comments:     45 pack year history   Substance Use Topics    Alcohol use: No    Drug use: No       Allergies:   Review of patient's allergies indicates:   Allergen Reactions    Gabapentin Swelling     Note: unilateral joint edema, unclear if due to gabapentin    Nsaids (non-steroidal anti-inflammatory drug) Other (See Comments)     Instructed not to take NSAID drugs with chemo pill.    Zometa [zoledronic acid] Other (See Comments)     Possible osteonecrosis jaw    Clindamycin Rash    Vancomycin analogues Itching       Home Medications:  Current Outpatient Medications on File Prior to Visit   Medication Sig Dispense Refill    albuterol (PROVENTIL/VENTOLIN HFA) 90 mcg/actuation inhaler Inhale 2 puffs into the lungs every 4 (four) hours as needed for Wheezing or Shortness of Breath. 18 g 11    ALPRAZolam (XANAX) 0.5 MG tablet Take 1 tablet (0.5 mg total) by mouth 2 (two) times daily as needed for Anxiety. 60 tablet 0    atorvastatin (LIPITOR) 40 MG tablet Take 1 tablet (40 mg total) by mouth once daily. 90 tablet 3    calcium carbonate 400 mg calcium (1,000 mg) Chew Take 2,000 mg by mouth once daily.      clopidogreL (PLAVIX) 75 mg tablet Take 1 tablet (75 mg total) by mouth once daily. 30 tablet 11    DULoxetine (CYMBALTA) 60 MG capsule Take 1 capsule by mouth once daily 30 capsule 0    ergocalciferol (ERGOCALCIFEROL) 50,000 unit Cap Take 1 capsule by mouth once a week 12 capsule 0    ergocalciferol (VITAMIN D2) 50,000 unit Cap Take 5,000 Units by mouth once daily at 6am.       folic acid (FOLVITE) 1 MG tablet Take 1 tablet (1 mg total) by mouth once daily.  100 tablet 3    HYDROcodone-acetaminophen (NORCO)  mg per tablet Take 1 tablet by mouth every 6 (six) hours as needed for Pain (3 doses/day). 90 tablet 0    hydrOXYzine HCL (ATARAX) 25 MG tablet Take 1 tablet (25 mg total) by mouth 3 (three) times daily as needed for Itching. 90 tablet 1    ipratropium (ATROVENT) 42 mcg (0.06 %) nasal spray 2 sprays by Nasal route 4 (four) times daily. As needed for rhinitis. Take in evening before bed and in the morning 15 mL 11    levothyroxine (SYNTHROID) 300 MCG Tab Take 1 tablet (300 mcg total) by mouth before breakfast. 30 tablet 11    multivitamin (THERAGRAN) per tablet Take 1 tablet by mouth once daily.      naloxone (NARCAN) 4 mg/actuation Spry SMARTSIG:Both Nares      palbociclib (IBRANCE) 125 mg Cap Take one capsule (125 mg) by mouth once daily on days 1-21 of each 28-day cycle. 21 capsule 11    pentoxifylline (TRENTAL) 400 mg TbSR Take 1 tablet (400 mg total) by mouth 2 (two) times daily with meals. 60 tablet 11    potassium chloride SA (K-DUR,KLOR-CON) 20 MEQ tablet Take 1 tablet (20 mEq total) by mouth once daily. Take daily for 3 days. 3 tablet 1    vitamin E 1000 UNIT capsule Take 1 capsule (1,000 Units total) by mouth once daily. 30 capsule 11    [DISCONTINUED] OLANZapine (ZYPREXA) 5 MG tablet Take 1 tablet (5 mg total) by mouth every evening. 30 tablet 2     No current facility-administered medications on file prior to visit.       [facility] 12/05/2020 Comment: no cycle x3 years  There is no height or weight on file to calculate BMI.  Physical Exam  Constitutional:       General: She is not in acute distress.  HENT:      Head: Normocephalic.   Neurological:      General: No focal deficit present.      Mental Status: She is alert.   Psychiatric:         Mood and Affect: Mood normal.         Judgment: Judgment normal.         Labs: Pertinent labs reviewed.  CRC Screening:     Assessment:   1. Abdominal pain, unspecified abdominal location    2. Diarrhea, unspecified  type    Diarrhea has resolved.   Has abdominal pain/lower chest pain on both sides. Feels like spasms.     Recommendations:   Discussed that if this is intestinal pain, then Bentyl should help  If this does not help, then would recommend follow up with PCP as I am not sure this is GI related pain.     Abdominal pain, unspecified abdominal location  -     dicyclomine (BENTYL) 20 mg tablet; Take 1 tablet (20 mg total) by mouth 3 (three) times daily as needed (abdominal pain).  Dispense: 90 tablet; Refill: 11    Diarrhea, unspecified type    Return to Clinic:  Follow up if symptoms worsen or fail to improve.    Thank you for the opportunity to participate in the care of this patient.  ANA PAULA Leung

## 2025-01-02 RX ORDER — PNV NO.95/FERROUS FUM/FOLIC AC 28MG-0.8MG
1000 TABLET ORAL DAILY
Qty: 30 CAPSULE | Refills: 11 | Status: SHIPPED | OUTPATIENT
Start: 2025-01-02

## 2025-01-07 ENCOUNTER — TELEPHONE (OUTPATIENT)
Dept: PALLIATIVE MEDICINE | Facility: CLINIC | Age: 55
End: 2025-01-07
Payer: MEDICAID

## 2025-01-07 NOTE — TELEPHONE ENCOUNTER
Attempted to contact patient about appointment changed tomorrow. Provider will be virtual. Call back information provided.

## 2025-01-08 ENCOUNTER — OFFICE VISIT (OUTPATIENT)
Dept: PALLIATIVE MEDICINE | Facility: CLINIC | Age: 55
End: 2025-01-08
Payer: MEDICAID

## 2025-01-08 DIAGNOSIS — G89.3 NEOPLASM RELATED PAIN: ICD-10-CM

## 2025-01-08 DIAGNOSIS — Z17.0 MALIGNANT NEOPLASM OF OVERLAPPING SITES OF RIGHT BREAST IN FEMALE, ESTROGEN RECEPTOR POSITIVE: Primary | ICD-10-CM

## 2025-01-08 DIAGNOSIS — C50.811 MALIGNANT NEOPLASM OF OVERLAPPING SITES OF RIGHT BREAST IN FEMALE, ESTROGEN RECEPTOR POSITIVE: Primary | ICD-10-CM

## 2025-01-08 DIAGNOSIS — Z51.5 PALLIATIVE CARE ENCOUNTER: ICD-10-CM

## 2025-01-14 ENCOUNTER — OFFICE VISIT (OUTPATIENT)
Dept: HEMATOLOGY/ONCOLOGY | Facility: CLINIC | Age: 55
End: 2025-01-14
Payer: MEDICAID

## 2025-01-14 ENCOUNTER — INFUSION (OUTPATIENT)
Dept: INFUSION THERAPY | Facility: HOSPITAL | Age: 55
End: 2025-01-14
Attending: INTERNAL MEDICINE
Payer: MEDICAID

## 2025-01-14 ENCOUNTER — LAB VISIT (OUTPATIENT)
Dept: LAB | Facility: HOSPITAL | Age: 55
End: 2025-01-14
Attending: FAMILY MEDICINE
Payer: MEDICAID

## 2025-01-14 VITALS
SYSTOLIC BLOOD PRESSURE: 115 MMHG | RESPIRATION RATE: 16 BRPM | OXYGEN SATURATION: 98 % | DIASTOLIC BLOOD PRESSURE: 83 MMHG | TEMPERATURE: 98 F | HEART RATE: 101 BPM

## 2025-01-14 DIAGNOSIS — C50.919 PRIMARY MALIGNANT NEOPLASM OF BREAST WITH METASTASIS TO OTHER SITE, UNSPECIFIED LATERALITY: Primary | ICD-10-CM

## 2025-01-14 DIAGNOSIS — Z17.0 MALIGNANT NEOPLASM OF OVERLAPPING SITES OF RIGHT BREAST IN FEMALE, ESTROGEN RECEPTOR POSITIVE: Primary | ICD-10-CM

## 2025-01-14 DIAGNOSIS — C73 PAPILLARY THYROID CARCINOMA: ICD-10-CM

## 2025-01-14 DIAGNOSIS — C79.51 SECONDARY CANCER OF BONE: ICD-10-CM

## 2025-01-14 DIAGNOSIS — E03.9 HYPOTHYROIDISM, UNSPECIFIED TYPE: ICD-10-CM

## 2025-01-14 DIAGNOSIS — C50.919 PRIMARY MALIGNANT NEOPLASM OF BREAST WITH METASTASIS TO OTHER SITE, UNSPECIFIED LATERALITY: ICD-10-CM

## 2025-01-14 DIAGNOSIS — C53.0 MALIGNANT NEOPLASM OF ENDOCERVIX: ICD-10-CM

## 2025-01-14 DIAGNOSIS — C77.3 MALIGNANT NEOPLASM METASTATIC TO LYMPH NODE OF AXILLA: ICD-10-CM

## 2025-01-14 DIAGNOSIS — C50.811 MALIGNANT NEOPLASM OF OVERLAPPING SITES OF RIGHT BREAST IN FEMALE, ESTROGEN RECEPTOR POSITIVE: Primary | ICD-10-CM

## 2025-01-14 LAB
ALBUMIN SERPL BCP-MCNC: 3.6 G/DL (ref 3.5–5.2)
ALP SERPL-CCNC: 85 U/L (ref 40–150)
ALT SERPL W/O P-5'-P-CCNC: 35 U/L (ref 10–44)
ANION GAP SERPL CALC-SCNC: 11 MMOL/L (ref 8–16)
AST SERPL-CCNC: 30 U/L (ref 10–40)
BASOPHILS # BLD AUTO: 0.07 K/UL (ref 0–0.2)
BASOPHILS NFR BLD: 0.7 % (ref 0–1.9)
BILIRUB SERPL-MCNC: 0.4 MG/DL (ref 0.1–1)
BUN SERPL-MCNC: 14 MG/DL (ref 6–20)
CALCIUM SERPL-MCNC: 9.4 MG/DL (ref 8.7–10.5)
CHLORIDE SERPL-SCNC: 103 MMOL/L (ref 95–110)
CO2 SERPL-SCNC: 25 MMOL/L (ref 23–29)
CREAT SERPL-MCNC: 1.6 MG/DL (ref 0.5–1.4)
DIFFERENTIAL METHOD BLD: ABNORMAL
EOSINOPHIL # BLD AUTO: 0.2 K/UL (ref 0–0.5)
EOSINOPHIL NFR BLD: 2.2 % (ref 0–8)
ERYTHROCYTE [DISTWIDTH] IN BLOOD BY AUTOMATED COUNT: 14.3 % (ref 11.5–14.5)
EST. GFR  (NO RACE VARIABLE): 38 ML/MIN/1.73 M^2
GLUCOSE SERPL-MCNC: 149 MG/DL (ref 70–110)
HCT VFR BLD AUTO: 40.2 % (ref 37–48.5)
HGB BLD-MCNC: 13.2 G/DL (ref 12–16)
IMM GRANULOCYTES # BLD AUTO: 0.14 K/UL (ref 0–0.04)
IMM GRANULOCYTES NFR BLD AUTO: 1.4 % (ref 0–0.5)
LDH SERPL L TO P-CCNC: 198 U/L (ref 110–260)
LYMPHOCYTES # BLD AUTO: 2.1 K/UL (ref 1–4.8)
LYMPHOCYTES NFR BLD: 21.7 % (ref 18–48)
MAGNESIUM SERPL-MCNC: 1.9 MG/DL (ref 1.6–2.6)
MCH RBC QN AUTO: 33.9 PG (ref 27–31)
MCHC RBC AUTO-ENTMCNC: 32.8 G/DL (ref 32–36)
MCV RBC AUTO: 103 FL (ref 82–98)
MONOCYTES # BLD AUTO: 0.7 K/UL (ref 0.3–1)
MONOCYTES NFR BLD: 6.9 % (ref 4–15)
NEUTROPHILS # BLD AUTO: 6.6 K/UL (ref 1.8–7.7)
NEUTROPHILS NFR BLD: 67.1 % (ref 38–73)
NRBC BLD-RTO: 0 /100 WBC
PHOSPHATE SERPL-MCNC: 2.8 MG/DL (ref 2.7–4.5)
PLATELET # BLD AUTO: 205 K/UL (ref 150–450)
PMV BLD AUTO: 11.8 FL (ref 9.2–12.9)
POTASSIUM SERPL-SCNC: 3.8 MMOL/L (ref 3.5–5.1)
PROT SERPL-MCNC: 7 G/DL (ref 6–8.4)
RBC # BLD AUTO: 3.89 M/UL (ref 4–5.4)
SODIUM SERPL-SCNC: 139 MMOL/L (ref 136–145)
T4 FREE SERPL-MCNC: 0.66 NG/DL (ref 0.71–1.51)
TSH SERPL DL<=0.005 MIU/L-ACNC: 27.77 UIU/ML (ref 0.4–4)
WBC # BLD AUTO: 9.8 K/UL (ref 3.9–12.7)

## 2025-01-14 PROCEDURE — 84439 ASSAY OF FREE THYROXINE: CPT | Performed by: INTERNAL MEDICINE

## 2025-01-14 PROCEDURE — 83615 LACTATE (LD) (LDH) ENZYME: CPT | Performed by: INTERNAL MEDICINE

## 2025-01-14 PROCEDURE — 84443 ASSAY THYROID STIM HORMONE: CPT | Performed by: INTERNAL MEDICINE

## 2025-01-14 PROCEDURE — 85025 COMPLETE CBC W/AUTO DIFF WBC: CPT | Performed by: INTERNAL MEDICINE

## 2025-01-14 PROCEDURE — 84100 ASSAY OF PHOSPHORUS: CPT | Performed by: INTERNAL MEDICINE

## 2025-01-14 PROCEDURE — 25000003 PHARM REV CODE 250

## 2025-01-14 PROCEDURE — 83735 ASSAY OF MAGNESIUM: CPT | Performed by: INTERNAL MEDICINE

## 2025-01-14 PROCEDURE — 80053 COMPREHEN METABOLIC PANEL: CPT | Performed by: INTERNAL MEDICINE

## 2025-01-14 PROCEDURE — 36415 COLL VENOUS BLD VENIPUNCTURE: CPT | Performed by: INTERNAL MEDICINE

## 2025-01-14 PROCEDURE — 63600175 PHARM REV CODE 636 W HCPCS

## 2025-01-14 PROCEDURE — A4216 STERILE WATER/SALINE, 10 ML: HCPCS

## 2025-01-14 PROCEDURE — 96523 IRRIG DRUG DELIVERY DEVICE: CPT

## 2025-01-14 RX ORDER — HEPARIN 100 UNIT/ML
500 SYRINGE INTRAVENOUS
OUTPATIENT
Start: 2025-01-14

## 2025-01-14 RX ORDER — SODIUM CHLORIDE 0.9 % (FLUSH) 0.9 %
10 SYRINGE (ML) INJECTION
OUTPATIENT
Start: 2025-01-14

## 2025-01-14 RX ORDER — SODIUM CHLORIDE 0.9 % (FLUSH) 0.9 %
10 SYRINGE (ML) INJECTION
Status: DISCONTINUED | OUTPATIENT
Start: 2025-01-14 | End: 2025-01-14 | Stop reason: HOSPADM

## 2025-01-14 RX ORDER — HEPARIN 100 UNIT/ML
500 SYRINGE INTRAVENOUS
Status: DISCONTINUED | OUTPATIENT
Start: 2025-01-14 | End: 2025-01-14 | Stop reason: HOSPADM

## 2025-01-14 RX ADMIN — Medication 10 ML: at 01:01

## 2025-01-14 RX ADMIN — HEPARIN 500 UNITS: 100 SYRINGE at 01:01

## 2025-01-14 NOTE — NURSING
".Pt here for Mediport Flush. Right chestwall mediport accessed with a 20g 1" tomlin via sterile technique.  Excellent blood return noted.  Flushed with 10ml NS and 5 ml heparin solution.  Needle D/C, site without redness, swelling, or drainage noted.  Dressing applied.  Patient tolerated well.  Patient to return to clinic in 8 weeks.   "

## 2025-01-15 DIAGNOSIS — Z17.0 MALIGNANT NEOPLASM OF OVERLAPPING SITES OF RIGHT BREAST IN FEMALE, ESTROGEN RECEPTOR POSITIVE: ICD-10-CM

## 2025-01-15 DIAGNOSIS — C50.811 MALIGNANT NEOPLASM OF OVERLAPPING SITES OF RIGHT BREAST IN FEMALE, ESTROGEN RECEPTOR POSITIVE: ICD-10-CM

## 2025-01-15 RX ORDER — LETROZOLE 2.5 MG/1
2.5 TABLET, FILM COATED ORAL DAILY
Qty: 30 TABLET | Refills: 2 | Status: SHIPPED | OUTPATIENT
Start: 2025-01-15 | End: 2026-01-15

## 2025-01-15 NOTE — ASSESSMENT & PLAN NOTE
Labs reveiwed, no concerning findings  Continue on Ibrance and letrozole   Plan for repeat imaging prior to next visit

## 2025-01-15 NOTE — ASSESSMENT & PLAN NOTE
s/p total thyroidectomy on levothyroxine.   Pt currently taking levothyroxine 300mcg daily  TSH with continued rise   Case discussed with primary oncologist plan for referral to endocrinology

## 2025-01-15 NOTE — ASSESSMENT & PLAN NOTE
Neoplasm related pain  Stable  -Norco 10mg TID PRN  -I informed pt that Dr. Joseph informed me via staff message that he has reviewed her scans and there is no evidence of disease progression.   -Post right ankle fixation on June 13, 2024.   -Temporarily suspend opioid weaning due to persistent rib pain.   -Patient instructed to contact me if 3 day supply of meds left.   -Narcan RX given and explained use to pt and family. Pt and family verbalized understanding.    -Pain Contract-4/18/2023  -UDS- 09/17/2024: Consistent with prescribed Norco and Xanax. Marijuana noted. Pt unable to obtain medical marijuana card due to cost.

## 2025-01-15 NOTE — ASSESSMENT & PLAN NOTE
Currently on Levothyroxine 300mcg  TSH continues upward trend  Pt denies missed doses  Plan for referral to endocrinology

## 2025-01-15 NOTE — ASSESSMENT & PLAN NOTE
-Code status: DNR w/Selective treatment. [place] 01/6/2022.   -HCPOA: Sister: David Shuff: [phone]   -GOC:  Continue cancer treatment, symptom management, maintain independence and functional status.   -See past visit notes for further details

## 2025-01-15 NOTE — ASSESSMENT & PLAN NOTE
Previously on bisphosphonate complicated by osteonecrosis of the jaw following with ENT   Continues pain mgmt regimen with palliative care

## 2025-01-15 NOTE — PROGRESS NOTES
Subjective:     Patient ID:?Josey Flores is a 54 y.o. female.?? MR#: [id number]   ?   PRIMARY ONCOLOGIST: Dr. Ruff  ?   CHIEF COMPLAINT: follow-up?????   ?   ONCOLOGIC DIAGNOSIS:  - Stage IV cervical squamous cell carcinoma  - Stage IV, T2 N1b M1, invasive breast carcinoma, ER/[place]+, HER2-  - Papillary thyroid carcinoma status post total thyroidectomy on 08/31/2017, mpT1b N0   - Cervical HSIL MIREILLE 3 s/p LEEP, 2017    CURRENT TREATMENT:  - Letrozole and palbociclib     PAST TREATMENT:  - Palliative chemotherapy: cisplatin, paclitaxel and bevacizumab; 02/22/2021 cycle 1 day 1 -> bevacizumab maintenance after 6 cycles  - Carboplatin, paclitaxel and pembrolizumab, C1D1 1/28/22; maintenance pembrolizumab, d/c 9/2022    The patient location is: [place]   The chief complaint leading to consultation is: follow-up    Visit type: audiovisual    Face to Face time with patient: 10  20 minutes of total time spent on the encounter, which includes face to face time and non-face to face time preparing to see the patient (eg, review of tests), Obtaining and/or reviewing separately obtained history, Documenting clinical information in the electronic or other health record, Independently interpreting results (not separately reported) and communicating results to the patient/family/caregiver, or Care coordination (not separately reported).         Each patient to whom he or she provides medical services by telemedicine is:  (1) informed of the relationship between the physician and patient and the respective role of any other health care provider with respect to management of the patient; and (2) notified that he or she may decline to receive medical services by telemedicine and may withdraw from such care at any time.    Notes:    HPI  Ms. Flores is a pleasant 54-year-old female who presents today for follow up of cervical cancer and breast cancer on letrozole and ibrance. Pt states she was feeling well until recently coming down with cold  she notes currently with runny nose and cough and intermittent chills--recommend urgent care or visit with PCP. She continue on letrozole daily and ibrance on  days 1-21 of 28-day cycle--she is on day 2 of week off. She reports tolerating treatment well overall without significant side effects.       Oncology History   Secondary cancer of bone    Chemotherapy    Treatment Summary   Plan Name: OP GYN PACLITAXEL CISPLATIN BEVACIZUMAB Q3W  Treatment Goal: Control  Status: Inactive  Start Date: 2/22/2021  End Date: 7/7/2021  Provider: Estefani Asencio MD  Chemotherapy: CISplatin (PLATINOL) 50 mg/m2 = 108 mg in sodium chloride 0.9% 608 mL chemo infusion, 50 mg/m2 = 108 mg, Intravenous, Clinic/HOD 1 time, 6 of 6 cycles  Administration: 108 mg (2/22/2021), 108 mg (3/15/2021), 108 mg (4/6/2021), 108 mg (5/11/2021), 108 mg (6/1/2021), 108 mg (6/22/2021)    bevacizumab (AVASTIN) 15 mg/kg = 1,480 mg in sodium chloride 0.9% 100 mL chemo infusion, 15 mg/kg = 1,480 mg, Intravenous, Clinic/HOD 1 time, 6 of 6 cycles  Administration: 1,480 mg (4/6/2021), 1,480 mg (2/23/2021), 1,480 mg (3/16/2021), 1,480 mg (6/1/2021), 1,480 mg (6/22/2021), 1,480 mg (5/12/2021)    PACLitaxeL (TAXOL) 175 mg/m2 = 378 mg in sodium chloride 0.9% 563 mL chemo infusion, 175 mg/m2 = 378 mg, Intravenous, Clinic/HOD 1 time, 6 of 6 cycles  Administration: 378 mg (2/22/2021), 378 mg (3/15/2021), 378 mg (4/6/2021), 378 mg (5/11/2021), 378 mg (6/1/2021), 378 mg (6/22/2021)    Plan Name: OP BEVACIZUMAB Q3W   Treatment Goal: Maintenance  Status: Inactive  Start Date: 7/13/2021  End Date: 12/28/2021  Provider: Estefani Asencio MD  Chemotherapy: bevacizumab (AVASTIN) 1,300 mg in sodium chloride 0.9% 100 mL chemo infusion, 1,345 mg, Intravenous, [facility] 1 time, 9 of 17 cycles  Administration: 1,300 mg (7/13/2021), 1,345 mg (8/3/2021), 1,300 mg (8/24/2021), 1,300 mg (9/14/2021), 1,345 mg (10/5/2021), 1,345 mg (10/26/2021), 1,345 mg (11/16/2021), 1,300 mg  (12/7/2021), 1,300 mg (12/28/2021)    Plan Name: OP CARBOPLATIN PACLITAXEL PEMBROLIZUMAB Q3W FOLLOWED BY MAINTENANCE PEMBROLIZUMAB 400 MG Q6W  Treatment Goal: Control  Status: Inactive  Start Date: 1/28/2022  End Date: 8/11/2022  Provider: Estefani Asencio MD  Chemotherapy: CARBOplatin (PARAPLATIN) 425 mg in sodium chloride 0.9% 292.5 mL chemo infusion, 425 mg, Intravenous, Clinic/HOD 1 time, 6 of 6 cycles  Dose modification:   (original dose 424 mg, Cycle 6),   (original dose 424 mg, Cycle 6)  Administration: 425 mg (1/28/2022), 495 mg (2/18/2022), 525 mg (6/2/2022), 525 mg (5/12/2022), 525 mg (3/11/2022), 495 mg (4/11/2022)    PACLitaxeL (TAXOL) 175 mg/m2 = 366 mg in sodium chloride 0.9% 561 mL chemo infusion, 175 mg/m2 = 366 mg (100 % of original dose 175 mg/m2), Intravenous, Clinic/HOD 1 time, 6 of 6 cycles  Dose modification: 175 mg/m2 (original dose 175 mg/m2, Cycle 1), 175 mg/m2 (original dose 175 mg/m2, Cycle 4)  Administration: 366 mg (1/28/2022), 366 mg (2/18/2022), 366 mg (3/11/2022), 366 mg (4/11/2022), 366 mg (5/12/2022), 366 mg (6/2/2022)       Malignant neoplasm of endocervix   9/10/2020 Imaging Significant Findings    CT A/P: Negative for ascites, omental caking, lymphadenopathy, or a gross cervical mass     12/7/2020 Biopsy    EMBX: SCC, p16+     1/12/2021 Imaging Significant Findings    MRI Pelvis w/ w/o  4.9 cm cervical mass  R parametrial invasion  R external iliac lymphadenopathy     1/27/2021 Imaging Significant Findings    PET/CT: FDG avidity in the chest     2/18/2021 Biopsy    Endoscopic biopsy of the lung: +      2/22/2021 - 6/29/2021 Chemotherapy    Cisplatin/paclitaxel/avastin x6     3/12/2021 Genetic Testing    STRATA: FRANK, PIK3c, Ep300, ERBB3, KDM6A     4/24/2021 Imaging Significant Findings    CT Pelvis w/: JUAN FRANCISCO with a normal cervix     4/26/2021 Imaging Significant Findings    PET/CT: No FDG avidity     5/18/2021 Imaging Significant Findings    CT A/P w/: JUAN FRANCISCO     6/5/2021 Imaging  Significant Findings    CT A/P w/: JUAN FRANCISCO     7/12/2021 Imaging Significant Findings    PET/CT: JUAN FRANCISCO     7/13/2021 - 12/28/2021 Maintenance Therapy    Avastin x 9     10/20/2021 Imaging Significant Findings    PET/CT: JUAN FRANCISCO     1/11/2022 Imaging Significant Findings    PET/CT: Increased FDG avidity in the cervix.  No other evidence of disease.     1/28/2022 - 6/2/2022 Chemotherapy    Carboplatin/paclitaxel/pembrolizumab x 6     3/25/2022 Imaging Significant Findings    PET/CT: Persistent FDG avidity in the cervix.  New FDG avidity in the inguinal lymph nodes (although it does not look suspicious)     4/25/2022 - 4/29/2022 Radiation Therapy    Treating physician: Dr. Vázquez (MRI guided T&O)  Total Dose: 30 Gy  Fractions: 5     6/30/2022 - 8/11/2022 Maintenance Therapy    Pembrolizumab 400 mg IV x2     8/5/2022 Imaging Significant Findings    PET/CT: JUAN FRANCISCO     10/16/2022 Imaging Significant Findings    CT A/P w/ (Women's without imaging available): JUAN FRANCISCO     1/11/2023 Imaging Significant Findings    PET/CT: JUAN FRANCISCO      Chemotherapy    Treatment Summary   Plan Name: OP GYN PACLITAXEL CISPLATIN BEVACIZUMAB Q3W  Treatment Goal: Control  Status: Inactive  Start Date: 2/22/2021  End Date: 7/7/2021  Provider: Estefani Asencio MD  Chemotherapy: CISplatin (PLATINOL) 50 mg/m2 = 108 mg in sodium chloride 0.9% 608 mL chemo infusion, 50 mg/m2 = 108 mg, Intravenous, Clinic/HOD 1 time, 6 of 6 cycles  Administration: 108 mg (2/22/2021), 108 mg (3/15/2021), 108 mg (4/6/2021), 108 mg (5/11/2021), 108 mg (6/1/2021), 108 mg (6/22/2021)    bevacizumab (AVASTIN) 15 mg/kg = 1,480 mg in sodium chloride 0.9% 100 mL chemo infusion, 15 mg/kg = 1,480 mg, Intravenous, Clinic/HOD 1 time, 6 of 6 cycles  Administration: 1,480 mg (4/6/2021), 1,480 mg (2/23/2021), 1,480 mg (3/16/2021), 1,480 mg (6/1/2021), 1,480 mg (6/22/2021), 1,480 mg (5/12/2021)    PACLitaxeL (TAXOL) 175 mg/m2 = 378 mg in sodium chloride 0.9% 563 mL chemo infusion, 175 mg/m2 = 378 mg,  Intravenous, Clinic/HOD 1 time, 6 of 6 cycles  Administration: 378 mg (2/22/2021), 378 mg (3/15/2021), 378 mg (4/6/2021), 378 mg (5/11/2021), 378 mg (6/1/2021), 378 mg (6/22/2021)    Plan Name: OP BEVACIZUMAB Q3W   Treatment Goal: Maintenance  Status: Inactive  Start Date: 7/13/2021  End Date: 12/28/2021  Provider: Estefani Asencio MD  Chemotherapy: bevacizumab (AVASTIN) 1,300 mg in sodium chloride 0.9% 100 mL chemo infusion, 1,345 mg, Intravenous, Clinic/HOD 1 time, 9 of 17 cycles  Administration: 1,300 mg (7/13/2021), 1,345 mg (8/3/2021), 1,300 mg (8/24/2021), 1,300 mg (9/14/2021), 1,345 mg (10/5/2021), 1,345 mg (10/26/2021), 1,345 mg (11/16/2021), 1,300 mg (12/7/2021), 1,300 mg (12/28/2021)    Plan Name: OP CARBOPLATIN PACLITAXEL PEMBROLIZUMAB Q3W FOLLOWED BY MAINTENANCE PEMBROLIZUMAB 400 MG Q6W  Treatment Goal: Control  Status: Inactive  Start Date: 1/28/2022  End Date: 8/11/2022  Provider: Estefani Asencio MD  Chemotherapy: CARBOplatin (PARAPLATIN) 425 mg in sodium chloride 0.9% 292.5 mL chemo infusion, 425 mg, Intravenous, Clinic/HOD 1 time, 6 of 6 cycles  Dose modification:   (original dose 424 mg, Cycle 6),   (original dose 424 mg, Cycle 6)  Administration: 425 mg (1/28/2022), 495 mg (2/18/2022), 525 mg (6/2/2022), 525 mg (5/12/2022), 525 mg (3/11/2022), 495 mg (4/11/2022)    PACLitaxeL (TAXOL) 175 mg/m2 = 366 mg in sodium chloride 0.9% 561 mL chemo infusion, 175 mg/m2 = 366 mg (100 % of original dose 175 mg/m2), Intravenous, Clinic/HOD 1 time, 6 of 6 cycles  Dose modification: 175 mg/m2 (original dose 175 mg/m2, Cycle 1), 175 mg/m2 (original dose 175 mg/m2, Cycle 4)  Administration: 366 mg (1/28/2022), 366 mg (2/18/2022), 366 mg (3/11/2022), 366 mg (4/11/2022), 366 mg (5/12/2022), 366 mg (6/2/2022)          Social History     Socioeconomic History    Marital status:     Number of children: 2   Tobacco Use    Smoking status: Every Day     Current packs/day: 1.00     Average packs/day: 1  pack/day for 40.0 years (40.0 ttl pk-yrs)     Types: Cigarettes     Start date: 1/1/1985     Passive exposure: Current    Smokeless tobacco: Never    Tobacco comments:     45 pack year history   Substance and Sexual Activity    Alcohol use: No    Drug use: No    Sexual activity: Not Currently     Partners: Male     Birth control/protection: None   Social History Narrative     with two adult children; common law marriage (10+years); raised [place], no current [employer] practice     Social Drivers of Health     Financial Resource Strain: Low Risk  (1/2/2025)    Overall Financial Resource Strain (CARDIA)     Difficulty of Paying Living Expenses: Not very hard   Food Insecurity: No Food Insecurity (1/2/2025)    Hunger Vital Sign     Worried About Running Out of Food in the Last Year: Never true     Ran Out of Food in the Last Year: Never true   Transportation Needs: No Transportation Needs (5/27/2024)    Received from [facility]    PRAPARE - Transportation     Lack of Transportation (Medical): No     Lack of Transportation (Non-Medical): No   Physical Activity: Insufficiently Active (1/2/2025)    Exercise Vital Sign     Days of Exercise per Week: 1 day     Minutes of Exercise per Session: 30 min   Stress: Stress Concern Present (1/2/2025)    [employer] [place] - Occupational Stress Questionnaire     Feeling of Stress : Very much   Housing Stability: Unknown (11/27/2023)    Housing Stability Vital Sign     Unable to Pay for Housing in the Last Year: No     Unstable Housing in the Last Year: No      Family History   Problem Relation Name Age of Onset    Arthritis Mother Beatrice     Early death Mother Beatrice         64 at time of death    Heart attack Mother Beatrice     Heart disease Mother Beatrice     Heart failure Mother Beatrice     Hypertension Mother Beatrice     Coronary artery disease Father  David     Hearing loss Father David     Heart disease Father David     Hyperlipidemia Father David     Hypertension Father David     Mental illness Father David     Learning disabilities Son Keyur     Cancer Maternal Aunt        Past Surgical History:   Procedure Laterality Date    ABSCESS DRAINAGE      bilateral axilla    ADENOIDECTOMY      APPENDECTOMY      BREAST BIOPSY Right     x3    CHOLECYSTECTOMY      COLONOSCOPY N/A 8/5/2024    Procedure: COLONOSCOPY cc/bt done on 7/22/24 Dr. Eckert;  Surgeon: Doreen Beckman MD;  Location: [facility] ENDO;  Service: Endoscopy;  Laterality: N/A;    ENDOBRONCHIAL ULTRASOUND Bilateral 02/18/2021    Procedure: ENDOBRONCHIAL ULTRASOUND (EBUS);  Surgeon: Jaron Ni MD;  Location: [facility] ENDO;  Service: Pulmonary;  Laterality: Bilateral;    FLUOROSCOPY N/A 01/28/2021    Procedure: Mediport placement;  Surgeon: Noah Phelan MD;  Location: [facility] CATH LAB;  Service: General;  Laterality: N/A;    MASS EXCISION      MEDIPORT INSERTION, SINGLE Right 02/2021    SKIN GRAFT  06/16/2017    THYROIDECTOMY Bilateral     TONSILLECTOMY          Review of Systems   Constitutional:  Positive for chills. Negative for activity change, appetite change, fatigue and fever.   HENT:  Positive for rhinorrhea. Negative for congestion, facial swelling, nosebleeds, sore throat and trouble swallowing.    Eyes:  Negative for photophobia, pain and visual disturbance.   Respiratory:  Positive for cough. Negative for shortness of breath and wheezing.    Cardiovascular:  Negative for chest pain, palpitations and leg swelling.   Gastrointestinal:  Negative for abdominal distention, abdominal pain, anal bleeding, blood in stool, constipation, diarrhea, nausea, rectal pain and vomiting.   Genitourinary:  Negative for difficulty urinating, dysuria, hematuria and vaginal bleeding.   Musculoskeletal:  Negative for arthralgias.   Skin:  Negative for color change, pallor, rash and  wound.   Neurological:  Negative for dizziness, light-headedness, numbness and headaches.   Hematological:  Negative for adenopathy. Does not bruise/bleed easily.   Psychiatric/Behavioral:  The patient is not nervous/anxious.        ?   A comprehensive 14-point review of systems was reviewed with patient and was negative other than as specified above.   ?     Objective:      Physical Exam  Vitals reviewed: Virtual visit.   Constitutional:       Appearance: Normal appearance.   Neurological:      Mental Status: She is alert.   Psychiatric:         Mood and Affect: Mood normal.         ?   There were no vitals filed for this visit.   ?       ?   Laboratory:  ?   Lab Visit on 01/14/2025   Component Date Value Ref Range Status    Phosphorus 01/14/2025 2.8  2.7 - 4.5 mg/dL Final    Magnesium 01/14/2025 1.9  1.6 - 2.6 mg/dL Final    Sodium 01/14/2025 139  136 - 145 mmol/L Final    Potassium 01/14/2025 3.8  3.5 - 5.1 mmol/L Final    Chloride 01/14/2025 103  95 - 110 mmol/L Final    CO2 01/14/2025 25  23 - 29 mmol/L Final    Glucose 01/14/2025 149 (H)  70 - 110 mg/dL Final    BUN 01/14/2025 14  6 - 20 mg/dL Final    Creatinine 01/14/2025 1.6 (H)  0.5 - 1.4 mg/dL Final    Calcium 01/14/2025 9.4  8.7 - 10.5 mg/dL Final    Total Protein 01/14/2025 7.0  6.0 - 8.4 g/dL Final    Albumin 01/14/2025 3.6  3.5 - 5.2 g/dL Final    Total Bilirubin 01/14/2025 0.4  0.1 - 1.0 mg/dL Final    Alkaline Phosphatase 01/14/2025 85  40 - 150 U/L Final    AST 01/14/2025 30  10 - 40 U/L Final    ALT 01/14/2025 35  10 - 44 U/L Final    eGFR 01/14/2025 38 (A)  >60 mL/min/1.73 m^2 Final    Anion Gap 01/14/2025 11  8 - 16 mmol/L Final    WBC 01/14/2025 9.80  3.90 - 12.70 K/uL Final    RBC 01/14/2025 3.89 (L)  4.00 - 5.40 M/uL Final    Hemoglobin 01/14/2025 13.2  12.0 - 16.0 g/dL Final    Hematocrit 01/14/2025 40.2  37.0 - 48.5 % Final    MCV 01/14/2025 103 (H)  82 - 98 fL Final    MCH 01/14/2025 33.9 (H)  27.0 - 31.0 pg Final     MCHC 01/14/2025 32.8  32.0 - 36.0 g/dL Final    RDW 01/14/2025 14.3  11.5 - 14.5 % Final    Platelets 01/14/2025 205  150 - 450 K/uL Final    MPV 01/14/2025 11.8  9.2 - 12.9 fL Final    Immature Granulocytes 01/14/2025 1.4 (H)  0.0 - 0.5 % Final    Gran # (ANC) 01/14/2025 6.6  1.8 - 7.7 K/uL Final    Immature Grans (Abs) 01/14/2025 0.14 (H)  0.00 - 0.04 K/uL Final    Lymph # 01/14/2025 2.1  1.0 - 4.8 K/uL Final    Mono # 01/14/2025 0.7  0.3 - 1.0 K/uL Final    Eos # 01/14/2025 0.2  0.0 - 0.5 K/uL Final    Baso # 01/14/2025 0.07  0.00 - 0.20 K/uL Final    nRBC 01/14/2025 0  0 /100 WBC Final    Gran % 01/14/2025 67.1  38.0 - 73.0 % Final    Lymph % 01/14/2025 21.7  18.0 - 48.0 % Final    Mono % 01/14/2025 6.9  4.0 - 15.0 % Final    Eosinophil % 01/14/2025 2.2  0.0 - 8.0 % Final    Basophil % 01/14/2025 0.7  0.0 - 1.9 % Final    Differential Method 01/14/2025 Automated   Final    LD 01/14/2025 198  110 - 260 U/L Final    TSH 01/14/2025 27.775 (H)  0.400 - 4.000 uIU/mL Final    Free T4 01/14/2025 0.66 (L)  0.71 - 1.51 ng/dL Final      ?   Imaging:    No results found. However, due to the size of the patient record, not all encounters were searched. Please check Results Review for a complete set of results.     No results found. However, due to the size of the patient record, not all encounters were searched. Please check Results Review for a complete set of results.     ?   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   Pt currently taking levothyroxine 300mcg daily  TSH with continued rise   Case discussed with primary oncologist plan for referral to endocrinology          Relevant Orders    Ambulatory referral/consult to  Endocrinology    Malignant neoplasm of endocervix     Plan for continued surveillance          Hypothyroidism     Currently on Levothyroxine 300mcg  TSH continues upward trend  Pt denies missed doses  Plan for referral to endocrinology          Relevant Orders    Ambulatory referral/consult to Endocrinology    Primary breast cancer with metastasis to other site - Primary     Labs reveiwed, no concerning findings  Continue on Ibrance and letrozole   Plan for repeat imaging prior to next visit              Med Onc Chart Routing      Follow up with physician 4 weeks. with labs imaging prior   Follow up with MIGUEL    Infusion scheduling note    Injection scheduling note    Labs CBC, CMP, magnesium, phosphorus, free T4 and TSH   Scheduling:  Preferred lab:  Lab interval:     Imaging PET scan      Pharmacy appointment    Other referrals                   BLAS SoteloP-C  Hematology/Oncology

## 2025-01-15 NOTE — PROGRESS NOTES
Palliative Medicine Clinic Note  Follow-up        Consult Requested By: Dr. Ruff      Reason for Consult: Symptom  Management    Chief Complaint: Right ankle/foot fracture/pain and decreased mobility.           ASSESSMENT/PLAN:      Plan/Recommendations:      1. Malignant neoplasm of overlapping sites of right breast in female, estrogen receptor positive  Assessment & Plan:  -Followed by Dr. Ruff. Previously Dr. Joseph, Dr. Asencio  -On Surveillance- Continues Letrozole and Ibrance  -I informed pt that Dr. Joseph informed me via staff message that he has reviewed her scans and there is no evidence of disease progression.   CT A/P: 10/27/2024: No acute intraabdominal abnormality.   Agree with Virtual Radiologist report provided on call.   CT C/A/P: 08/14/2024: 1. I see no evidence to suggest metastatic disease in the chest, abdomen nor pelvis  2.  Emphysema is present; it is an independent risk factor for lung cancer. Recommend patient be evaluated for low dose cancer screening protocol.  3.  Stable right upper lobe pulmonary nodule as described above  4.  Coronary artery calcifications  5.  Fatty liver  6.  Left renal simple cyst (no further follow-up nor evaluation recommended for this isolated abnormality)        2. Neoplasm related pain  Overview:        Assessment & Plan:  Neoplasm related pain  Stable  -Norco 10mg TID PRN  -I informed pt that Dr. Joseph informed me via staff message that he has reviewed her scans and there is no evidence of disease progression.   -Post right ankle fixation on June 13, 2024.   -Temporarily suspend opioid weaning due to persistent rib pain.   -Patient instructed to contact me if 3 day supply of meds left.   -Narcan RX given and explained use to pt and family. Pt and family verbalized understanding.    -Pain Contract-4/18/2023  -UDS- 09/17/2024: Consistent with prescribed Norco and Xanax. Marijuana noted. Pt unable to obtain medical marijuana card due to cost.       3.  Palliative care encounter  Assessment & Plan:  -Code status: DNR w/Selective treatment. [place]POST 01/6/2022.   -HCPOA: Sister: David Acevedo: [phone]   -GOC:  Continue cancer treatment, symptom management, maintain independence and functional status.   -See past visit notes for further details             Advance Care Planning   Advance Directives:   Living Will: Yes        Copy on chart: Yes    [place]POST: Yes    Do Not Resuscitate Status: Yes    Medical Power of : Yes    Agent's Name:  Sister: David Acevedo   Agent's Contact Number:  [phone]    Decision Making:  Patient answered questions  Goals of Care: The patient endorses that what is most important right now is to focus on remaining as independent as possible, symptom/pain control, and quality of life, even if it means sacrificing a little time    Accordingly, we have decided that the best plan to meet the patient's goals includes continuing with treatment.        Follow up: 2 months     Plan discussed with Patient and spouse.     SUBJECTIVE:      History of Present Illness / Interval History:  Josey Flores is a 52 y.o. female with history of metastatic breast cancer, thyroid cancer post thyroidectomy (2017), and cervical cancer (2017). She is on surveillance on Letrozole and Ibrance.  S/p stroke with left-sided weakness (June 02, 2023).   Right foot fracture 05/16/2024- Post fixation June 13, 2024- Dr. Prince ([facility]).   She is followed by Dr. Ruff.  Presents to Palliative Care Clinic for pain and anxiety.   Please see previous palliative care notes for more details on pt's care so far.       1/8/25:    The patient location is: home  The chief complaint leading to consultation is:  Follow-up     Visit type: audiovisual     Face to Face time with patient:  10 minutes  35 minutes of total time spent on the encounter, which includes face to face time and non-face to face time preparing to see the patient (eg, review of tests),  Obtaining and/or reviewing separately obtained history, Documenting clinical information in the electronic or other health record, Independently interpreting results (not separately reported) and communicating results to the patient/family/caregiver, or Care coordination (not separately reported).      Each patient to whom he or she provides medical services by telemedicine is:  (1) informed of the relationship between the physician and patient and the respective role of any other health care provider with respect to management of the patient; and (2) notified that he or she may decline to receive medical services by telemedicine and may withdraw from such care at any time.     Notes:   Pt seen via audiovisual feed. A&O x3. No acute distress.   , Mr. Mccallum present.   Pt reported abdominal/pelvic pain has somewhat improved with Bentyl  She is also taking Norco 10mg TID PRN.     Past visits:     11/19/2024: History obtained from patient and medical record.   Pt attended clinic with her , Mr. Mccallum. She was in no acute distress. Tachycardic- 100R . Asymptomatic on room air.   She reported progressive bilateral rib and pelvic pain managed with Norco 10mg TID PRN. She also completed 10-day abx course for UTI diagnosed on 10/27/2024.   Anxiety has gotten worse in the past 2 weeks after [facility] staff told her she has progressive osseous mets. She is taking Xanax 0.5mg BID-TID PRN. She takes Duloxetine intermittently. She is also smoking more cigarettes and eating more to cope with stress.  She requested consult with Dr. Ruff to review her scans.   I informed pt that Dr. Joseph informed me via staff message that he has reviewed her scans and there is no evidence of disease progression.   Right leg fracture has healed. She is now full weight-bearing.       09/17/2024: History obtained from patient and medical record.   Pt attended clinic with her , Mr. Mccallum. No acute distress. Vital signs  stable on room air.  She is now able to bear weight on right leg with relatively stable. She is taking Norco 10mg TID PRN  Abdominal pain and diarrhea post -meal somewhat managed with imodium/lomotil.   No f/u with GI after she was told her colonoscopy showed no evidence of abnormality.   She continues to smoke 1-2ppd to cope with anxiety and stress. She wishes to return to smoking cessation counseling and mental health counseling.     08/15/2024: History obtained from patient and medical record.   Pt attended clinic with her , Mr. Mccallum. Seen sitting in wheelchair. No acute distress. Vital signs stable on room air.  Right leg pain stable on Norco 10mg BID-TID PRN. Able to complete light active ROM exercises/some weight bearing. She continues to follow with Dr. Prince ( Orthopedics)  Persistent anxiety depression due to financial stress, recent right leg injury, and weight gain (she reported eating more to manage stress). She is also taking Xanax TID (prescribed (BID) and Duloxetine.  We have discussed the importance of taking prescribed Xanax as prescribed. She wants to try cancer patient peer mentoring program. She stated Dr. Ruff will assist with this.   Discussed pt's UDS result on 7/23 positive for Marijuana, but not prescribed Xanax. Pt stated she is using Marijuana for pain and anxiety. She is unable to obtain medical marijuana card due to cost. She states she is using Xanax, but unsure why it did not show in UDS. We have discussed the risk of using unprescribed Marijuana including unintentional substance overdose due to Marijuana contamination with other substances (opioids, cocaine, etc). .     07/23/2024: History obtained from patient and medical record.   Pt attended clinic with her , Mr. Mccallum. Seen sitting in wheelchair. No acute distress. Vital signs stable on room air.  Right leg pain stable on Norco 10mg TID PRN. Able to complete light active ROM exercises. Next f/u visit w/   Suresh (Ortho) July 30th, 2024.   Persistent anxiety depression due to financial stress and recent right leg injury. She is unsure if she has Duloxetine at home. She takes Xanax  more often.   We have discussed the importance of taking Duloxetine to manage her mood, instead of taking Xanax alone.   Colonoscopy scheduled for Aug 05, 2024 per GI (Dr. Beckman) recommendations.      6/25/2024: Pt attended clinic with her , Mr. Mccallum. Seen sitting in wheelchair. She appeared uncomfortable.   She reported worsening right leg/foot pain following fixation on June 13, 2024 by Dr. Prince at [facility]. Follow-up on July 2nd.   She is taking Norco 10mg Q8H PRN with little relief.   Persistent abdominal cramping pending GI visit with Dr. Beckman on July 16, 2024.  She her mood is low due to decreased mobility post right ankle fracture and persistent pain.   She requested assistance with obtaining a wheelchair through DME. SW aware.      06/11/2024: History obtained from: Patient  Pt seen via audiovisual feed. A&O x3. Seen sitting in a car. No acute distress.   She reported persistent right ankle pain due to right foot fracture on May 16, 2024  She has been taking Norco 10mg Q6-8H PRN due to worsening pain.   She reported she is scheduled to undergo fixation of the right ankle on June 13th, 2024 by Dr. Prince at [facility].   Her mood is stable  on Duloxetine and Xanax PRN.     05/09/2024: History obtained from: Patient  and , Mr. Mccallum.   Pt attended clinic with her , Mr. Mccallum. She appeared uncomfortable.   Vital signs stable on room air.   She reported persistent lower abd/pelvic pain and bilateral ribs pain. She has been takin OTC Acetaminophen with no significant effect.   She saw Dr. Zurita (Chronic Pain). He increased Duloxetine and Lyrica, but Norco was discontinued.   She was previously on Norco 10mg QID PRN. Last 30-day refill was Dec. 2023.   She attempted to wean  to Norco 5mg TID, but has been unsuccessful.   She has been anxious and depressed due to financial stress. Last Xanax refill Jan. 2024.   No follow-up with her PCP or chronic pain since January 2024.       1/11/2024:   Pt attended clinic with her partner, Mr. Mccallum. She appeared uncomfortable. Vital signs stable on room air.   She has established care with chronic pain provider, Dr. Zurita  She reported she is now taking Lyrica and Duloxetine 60mg daily.   However, she has noted worsening abd/pelvic pain.   She is also attempting to self-wean her Norco 10mg Q6H PRN. She is now taking Norco 5mg Q6H PRN. Self weaning started 4-5 days ago   She is concerned about running out of narcotics and Dr. Zurita not willing to prescribe narcotics. I have explained to her I can not prescribe narcotics as her previous scans have shown no evidence of recurrence/metastasis.   Next CT on Saturday, Jan 13, 2024  Anxiety/Depression due to her dog's recent death.    Unsuccessful with smoking cessation due to stress    ROS:  Review of Systems   Constitutional:  Positive for appetite change (Increased due to stress) and fatigue. Negative for activity change and unexpected weight change.   HENT:  Negative for hearing loss, sore throat, trouble swallowing and voice change.    Eyes:  Negative for visual disturbance.   Respiratory:  Negative for cough, chest tightness, shortness of breath and wheezing.    Cardiovascular:  Negative for chest pain, palpitations and leg swelling.   Gastrointestinal:  Positive for abdominal pain (Intermittent lower abd cramps) and diarrhea (Intermittent). Negative for blood in stool, constipation, nausea, rectal pain and vomiting.   Genitourinary:  Positive for pelvic pain. Negative for bladder incontinence, difficulty urinating, flank pain, hematuria, nocturia and urgency.   Musculoskeletal:  Positive for arthralgias, leg pain (Right ankle/foot) and myalgias. Negative for neck pain.   Integumentary:  Positive  for wound (Right leg/ankle- surgical site).   Allergic/Immunologic: Negative for environmental allergies, food allergies and immunocompromised state.   Neurological:  Positive for weakness (Right leg post fx/fixation). Negative for dizziness, tremors, numbness, headaches, memory loss and coordination difficulties.   Hematological:  Negative for adenopathy. Does not bruise/bleed easily.   Psychiatric/Behavioral:  Positive for dysphoric mood (Chronic). Negative for behavioral problems, confusion, decreased concentration, self-injury and suicidal ideas. The patient is nervous/anxious (Due to cancer diagnosis).        Review of Symptoms      Symptom Assessment (ESAS 0-10 Scale)  Pain:  7  Dyspnea:  0  Anxiety:  7  Nausea:  0  Depression:  7  Anorexia:  0  Fatigue:  8  Insomnia:  0  Restlessness:  0  Agitation:  0     CAM / Delirium:  Negative  Constipation:  Negative  Diarrhea:  Positive    Anxiety:  Is nervous/anxious (Due to cancer diagnosis)  Constipation:  No constipation    Bowel Management Plan (BMP):  No      Comments:  Imodium/Lomotil    Pain Assessment:    Location(s): abdomen    Abdomen       Location: anterior        Quality: Cramping        Quantity: 7/10 in intensity        Chronicity: Onset 2 month(s) ago, stable since Starting Bentyl        Aggravating Factors: None        Alleviating Factors: Opiates (Bentyl)        Associated Symptoms: Myalgias    Modified Osmar Scale:  0    Performance Status:  80    ECOG Performance Status ndGndrndanddndend:nd nd2nd Living Arrangements:  Lives with spouse    Psychosocial/Cultural:   See Palliative Psychosocial Note: Yes  Lives with her significant other of 16 years: they have 3 children combined, 2 sons from previous marriage.    **Primary  to Follow**  Palliative Care  Consult: Yes            Medications:    Current Outpatient Medications:     albuterol (PROVENTIL/VENTOLIN HFA) 90 mcg/actuation inhaler, Inhale 2 puffs into the lungs every 4 (four) hours as  needed for Wheezing or Shortness of Breath., Disp: 18 g, Rfl: 11    ALPRAZolam (XANAX) 0.5 MG tablet, Take 1 tablet (0.5 mg total) by mouth 2 (two) times daily as needed for Anxiety., Disp: 60 tablet, Rfl: 0    atorvastatin (LIPITOR) 40 MG tablet, Take 1 tablet (40 mg total) by mouth once daily., Disp: 90 tablet, Rfl: 3    calcium carbonate 400 mg calcium (1,000 mg) Chew, Take 2,000 mg by mouth once daily., Disp: , Rfl:     clopidogreL (PLAVIX) 75 mg tablet, Take 1 tablet (75 mg total) by mouth once daily., Disp: 30 tablet, Rfl: 11    dicyclomine (BENTYL) 20 mg tablet, Take 1 tablet (20 mg total) by mouth 3 (three) times daily as needed (abdominal pain)., Disp: 90 tablet, Rfl: 11    DULoxetine (CYMBALTA) 60 MG capsule, Take 1 capsule by mouth once daily, Disp: 30 capsule, Rfl: 0    ergocalciferol (ERGOCALCIFEROL) 50,000 unit Cap, Take 1 capsule by mouth once a week, Disp: 12 capsule, Rfl: 0    ergocalciferol (VITAMIN D2) 50,000 unit Cap, Take 5,000 Units by mouth once daily at 6am. , Disp: , Rfl:     folic acid (FOLVITE) 1 MG tablet, Take 1 tablet (1 mg total) by mouth once daily., Disp: 100 tablet, Rfl: 3    HYDROcodone-acetaminophen (NORCO)  mg per tablet, Take 1 tablet by mouth every 6 (six) hours as needed for Pain (3 doses/day)., Disp: 90 tablet, Rfl: 0    hydrOXYzine HCL (ATARAX) 25 MG tablet, Take 1 tablet (25 mg total) by mouth 3 (three) times daily as needed for Itching., Disp: 90 tablet, Rfl: 1    ipratropium (ATROVENT) 42 mcg (0.06 %) nasal spray, 2 sprays by Nasal route 4 (four) times daily. As needed for rhinitis. Take in evening before bed and in the morning, Disp: 15 mL, Rfl: 11    levothyroxine (SYNTHROID) 300 MCG Tab, Take 1 tablet (300 mcg total) by mouth before breakfast., Disp: 30 tablet, Rfl: 11    multivitamin (THERAGRAN) per tablet, Take 1 tablet by mouth once daily., Disp: , Rfl:     naloxone (NARCAN) 4 mg/actuation Spry, SMARTSIG:Both Nares, Disp: , Rfl:     palbociclib (IBRANCE) 125  "mg Cap, Take one capsule (125 mg) by mouth once daily on days 1-21 of each 28-day cycle., Disp: 21 capsule, Rfl: 11    pentoxifylline (TRENTAL) 400 mg TbSR, Take 1 tablet (400 mg total) by mouth 2 (two) times daily with meals., Disp: 60 tablet, Rfl: 11    potassium chloride SA (K-DUR,KLOR-CON) 20 MEQ tablet, Take 1 tablet (20 mEq total) by mouth once daily. Take daily for 3 days., Disp: 3 tablet, Rfl: 1    vitamin E 1000 UNIT capsule, Take 1 capsule (1,000 Units total) by mouth once daily., Disp: 30 capsule, Rfl: 11  No current facility-administered medications for this visit.      External  database queried on 01/15/2025  by SUZIE REID :          Review of patient's allergies indicates:   Allergen Reactions    Gabapentin Swelling     Note: unilateral joint edema, unclear if due to gabapentin    Nsaids (non-steroidal anti-inflammatory drug) Other (See Comments)     Instructed not to take NSAID drugs with chemo pill.    Zometa [zoledronic acid] Other (See Comments)     Possible osteonecrosis jaw    Clindamycin Rash    Vancomycin analogues Itching           OBJECTIVE:         Physical Exam:        11/19/2024     1:27 PM 12/13/2024     2:58 PM 1/14/2025     1:16 PM   Vitals - 1 value per visit   SYSTOLIC 110 95 115   DIASTOLIC 76 62 83   Pulse 100 101 101   Temp 98.1 °F (36.7 °C) 97.4 °F (36.3 °C) 97.6 °F (36.4 °C)   Resp 16  16   SPO2 98 % 98 % 98 %   Weight (lb) 237.44 239.2    Weight (kg) 107.7 108.5    Height 5' 7" (1.702 m) 5' 7" (1.702 m)    BMI (Calculated) 37.2 37.5    Pain Score Five Zero           Physical Exam  Constitutional:       General: She is not in acute distress.     Appearance: Normal appearance. She is ill-appearing.   HENT:      Head: Normocephalic.   Eyes:      General: No scleral icterus.        Right eye: No discharge.         Left eye: No discharge.      Pupils: Pupils are equal, round, and reactive to light.   Pulmonary:      Effort: Pulmonary effort is normal. No " respiratory distress.   Neurological:      Mental Status: She is alert and oriented to person, place, and time.   Psychiatric:         Attention and Perception: Attention and perception normal.         Mood and Affect: Mood and affect normal.         Speech: Speech normal.         Behavior: Behavior normal. Behavior is cooperative.         Thought Content: Thought content normal. Thought content does not include suicidal ideation. Thought content does not include suicidal plan.         Cognition and Memory: Cognition and memory normal.         Judgment: Judgment normal.           Labs:   CMP  Sodium   Date Value Ref Range Status   01/14/2025 139 136 - 145 mmol/L Final     Potassium   Date Value Ref Range Status   01/14/2025 3.8 3.5 - 5.1 mmol/L Final     Chloride   Date Value Ref Range Status   01/14/2025 103 95 - 110 mmol/L Final     CO2   Date Value Ref Range Status   01/14/2025 25 23 - 29 mmol/L Final     Glucose   Date Value Ref Range Status   01/14/2025 149 (H) 70 - 110 mg/dL Final     BUN   Date Value Ref Range Status   01/14/2025 14 6 - 20 mg/dL Final     Creatinine   Date Value Ref Range Status   01/14/2025 1.6 (H) 0.5 - 1.4 mg/dL Final     Calcium   Date Value Ref Range Status   01/14/2025 9.4 8.7 - 10.5 mg/dL Final     Total Protein   Date Value Ref Range Status   01/14/2025 7.0 6.0 - 8.4 g/dL Final     Albumin   Date Value Ref Range Status   01/14/2025 3.6 3.5 - 5.2 g/dL Final     Total Bilirubin   Date Value Ref Range Status   01/14/2025 0.4 0.1 - 1.0 mg/dL Final     Comment:     For infants and newborns, interpretation of results should be based  on gestational age, weight and in agreement with clinical  observations.    Premature Infant recommended reference ranges:  Up to 24 hours.............<8.0 mg/dL  Up to 48 hours............<12.0 mg/dL  3-5 days..................<15.0 mg/dL  6-29 days.................<15.0 mg/dL       Alkaline Phosphatase   Date Value Ref Range Status   01/14/2025 85 02 - 776  U/L Final     AST   Date Value Ref Range Status   01/14/2025 30 10 - 40 U/L Final     ALT   Date Value Ref Range Status   01/14/2025 35 10 - 44 U/L Final     Anion Gap   Date Value Ref Range Status   01/14/2025 11 8 - 16 mmol/L Final     eGFR   Date Value Ref Range Status   01/14/2025 38 (A) >60 mL/min/1.73 m^2 Final             Imaging: CT A/P: 10/27/2024: No acute intraabdominal abnormality.   Agree with Virtual Radiologist report provided on call.          I spent a total of 35 minutes on the day of the visit. This includes face to face time in discussion of goals of care, symptom assessment, coordination of care and emotional support.  This also includes non-face to face time preparing to see the patient (eg, review of tests/imaging), obtaining and/or reviewing separately obtained history, documenting clinical information in the electronic or other health record, independently interpreting results and communicating results to the patient/family/caregiver, or care coordinator.         SUZIE GOTTI NP

## 2025-01-17 ENCOUNTER — TELEPHONE (OUTPATIENT)
Dept: HEMATOLOGY/ONCOLOGY | Facility: CLINIC | Age: 55
End: 2025-01-17
Payer: MEDICAID

## 2025-01-17 DIAGNOSIS — C50.811 MALIGNANT NEOPLASM OF OVERLAPPING SITES OF RIGHT BREAST IN FEMALE, ESTROGEN RECEPTOR POSITIVE: ICD-10-CM

## 2025-01-17 DIAGNOSIS — S92.901A UNSPECIFIED FRACTURE OF RIGHT FOOT, INITIAL ENCOUNTER FOR CLOSED FRACTURE: ICD-10-CM

## 2025-01-17 DIAGNOSIS — G89.3 NEOPLASM RELATED PAIN: ICD-10-CM

## 2025-01-17 DIAGNOSIS — F32.A ANXIETY AND DEPRESSION: ICD-10-CM

## 2025-01-17 DIAGNOSIS — F41.9 ANXIETY AND DEPRESSION: ICD-10-CM

## 2025-01-17 DIAGNOSIS — Z17.0 MALIGNANT NEOPLASM OF OVERLAPPING SITES OF RIGHT BREAST IN FEMALE, ESTROGEN RECEPTOR POSITIVE: ICD-10-CM

## 2025-01-17 DIAGNOSIS — C79.81 METASTATIC MALIGNANT NEOPLASM TO BREAST: Primary | ICD-10-CM

## 2025-01-17 RX ORDER — HYDROCODONE BITARTRATE AND ACETAMINOPHEN 10; 325 MG/1; MG/1
1 TABLET ORAL EVERY 6 HOURS PRN
Qty: 90 TABLET | Refills: 0 | Status: SHIPPED | OUTPATIENT
Start: 2025-01-20 | End: 2025-02-19

## 2025-01-17 RX ORDER — ALPRAZOLAM 0.5 MG/1
0.5 TABLET ORAL 2 TIMES DAILY PRN
Qty: 60 TABLET | Refills: 0 | Status: SHIPPED | OUTPATIENT
Start: 2025-01-20 | End: 2025-02-19

## 2025-01-17 NOTE — TELEPHONE ENCOUNTER
----- Message from Nurse Fung sent at 1/16/2025 12:04 PM CST -----  Contact: Natali/"[facility] [facility] [facility]    ----- Message -----  From: Scarlett Reddy  Sent: 1/16/2025  11:53 AM CST  To: Hailey Gaston Staff    Natali with [facility] is calling to speak with someone regarding diagnosis code. Request to receive diagnosis code for patient of date of service 9/17/2024. Fax number provided is [phone]. Please give "[employer] [facility] a call back at [phone] to assist.  Thank you,  GH

## 2025-01-17 NOTE — TELEPHONE ENCOUNTER
Returned Natali phone call. No answer, left voicemail with diagnostic codes and call back number. No other issues at this time.

## 2025-02-04 DIAGNOSIS — C77.3 MALIGNANT NEOPLASM METASTATIC TO LYMPH NODE OF AXILLA: ICD-10-CM

## 2025-02-04 DIAGNOSIS — C50.919 PRIMARY MALIGNANT NEOPLASM OF BREAST WITH METASTASIS TO OTHER SITE, UNSPECIFIED LATERALITY: ICD-10-CM

## 2025-02-04 DIAGNOSIS — C53.0 MALIGNANT NEOPLASM OF ENDOCERVIX: ICD-10-CM

## 2025-02-04 DIAGNOSIS — C79.51 SECONDARY CANCER OF BONE: ICD-10-CM

## 2025-02-04 DIAGNOSIS — Z17.0 MALIGNANT NEOPLASM OF OVERLAPPING SITES OF RIGHT BREAST IN FEMALE, ESTROGEN RECEPTOR POSITIVE: Primary | ICD-10-CM

## 2025-02-04 DIAGNOSIS — C50.811 MALIGNANT NEOPLASM OF OVERLAPPING SITES OF RIGHT BREAST IN FEMALE, ESTROGEN RECEPTOR POSITIVE: Primary | ICD-10-CM

## 2025-02-04 DIAGNOSIS — C73 PAPILLARY THYROID CARCINOMA: ICD-10-CM

## 2025-02-05 ENCOUNTER — TELEPHONE (OUTPATIENT)
Dept: HEMATOLOGY/ONCOLOGY | Facility: CLINIC | Age: 55
End: 2025-02-05
Payer: MEDICAID

## 2025-02-05 NOTE — TELEPHONE ENCOUNTER
----- Message from Nurse Lockhart sent at 2/4/2025  3:11 PM CST -----  Contact: Rechna w/ [facility]    ----- Message -----  From: Gregoria Westbrook  Sent: 2/4/2025   2:49 PM CST  To: Hailey Gaston Staff    .Type:  Patient Requesting Call    Who Called:Rechna w/ [facility]   Does the patient know what this is regarding?:Rep is requesting a diagnosis code PDM test, service date 09/17/24  Would the patient rather a call back or a response via [facility]? Call back   Best Call Back Number:[phone]  Additional Information: Please call back

## 2025-02-05 NOTE — TELEPHONE ENCOUNTER
Returned [facility] phone call and provided diagnostic codes C50.811 and G89.3 to Pedro. She verbalized understanding and had no other issues at this time.

## 2025-02-17 DIAGNOSIS — F41.9 ANXIETY AND DEPRESSION: ICD-10-CM

## 2025-02-17 DIAGNOSIS — C79.81 METASTATIC MALIGNANT NEOPLASM TO BREAST: Primary | ICD-10-CM

## 2025-02-17 DIAGNOSIS — F32.A ANXIETY AND DEPRESSION: ICD-10-CM

## 2025-02-18 DIAGNOSIS — G89.3 NEOPLASM RELATED PAIN: ICD-10-CM

## 2025-02-18 DIAGNOSIS — S92.901A UNSPECIFIED FRACTURE OF RIGHT FOOT, INITIAL ENCOUNTER FOR CLOSED FRACTURE: ICD-10-CM

## 2025-02-18 DIAGNOSIS — C79.81 METASTATIC MALIGNANT NEOPLASM TO BREAST: Primary | ICD-10-CM

## 2025-02-18 DIAGNOSIS — Z17.0 MALIGNANT NEOPLASM OF OVERLAPPING SITES OF RIGHT BREAST IN FEMALE, ESTROGEN RECEPTOR POSITIVE: ICD-10-CM

## 2025-02-18 DIAGNOSIS — C50.811 MALIGNANT NEOPLASM OF OVERLAPPING SITES OF RIGHT BREAST IN FEMALE, ESTROGEN RECEPTOR POSITIVE: ICD-10-CM

## 2025-02-18 RX ORDER — ALPRAZOLAM 0.5 MG/1
0.5 TABLET ORAL 2 TIMES DAILY PRN
Qty: 60 TABLET | Refills: 0 | Status: SHIPPED | OUTPATIENT
Start: 2025-02-18 | End: 2025-03-20

## 2025-02-18 RX ORDER — HYDROCODONE BITARTRATE AND ACETAMINOPHEN 10; 325 MG/1; MG/1
1 TABLET ORAL EVERY 6 HOURS PRN
Qty: 90 TABLET | Refills: 0 | Status: SHIPPED | OUTPATIENT
Start: 2025-02-18 | End: 2025-03-20

## 2025-02-27 ENCOUNTER — TELEPHONE (OUTPATIENT)
Dept: OPHTHALMOLOGY | Facility: CLINIC | Age: 55
End: 2025-02-27
Payer: MEDICAID

## 2025-02-27 NOTE — TELEPHONE ENCOUNTER
Called patient and scheduled her for next available with [facility] in [place]. Patient states blurred vision and headaches, felt the need to be seen soon but not immediately.     ----- Message from [facility] sent at 2/27/2025  4:15 PM CST -----  Contact: Josey  .Type:  Patient Returning CallWho Called: Josey Who Left Message for Patient: Amarilis Does the patient know what this is regarding?: Caren Would the patient rather a call back or a response via [facility]?  Call Best Call Back Number: [phone] Additional Information:

## 2025-02-27 NOTE — TELEPHONE ENCOUNTER
Called patient, patient did not answer. Left detailed voicemail with contact number to call back.      ----- Message from Donita sent at 2/27/2025  3:45 PM CST -----  Type:  Sooner Apoointment RequestCaller is requesting a sooner appointment.  Caller declined first available appointment listed below.  Caller will not accept being placed on the waitlist and is requesting a message be sent to doctor.Name of Caller:Vicki is the first available appointment?n/aSymptoms:Routine eye exam, headaches, blurred visionWould the patient rather a call back or a response via [facility]? CallbackBest Call Back Number:[phone] Information: Need a appointment

## 2025-03-04 DIAGNOSIS — C77.3 MALIGNANT NEOPLASM METASTATIC TO LYMPH NODE OF AXILLA: Primary | ICD-10-CM

## 2025-03-05 ENCOUNTER — HOSPITAL ENCOUNTER (OUTPATIENT)
Dept: RADIOLOGY | Facility: HOSPITAL | Age: 55
Discharge: HOME | End: 2025-03-05
Payer: MEDICAID

## 2025-03-05 DIAGNOSIS — Z17.0 MALIGNANT NEOPLASM OF OVERLAPPING SITES OF RIGHT BREAST IN FEMALE, ESTROGEN RECEPTOR POSITIVE: ICD-10-CM

## 2025-03-05 DIAGNOSIS — C50.811 MALIGNANT NEOPLASM OF OVERLAPPING SITES OF RIGHT BREAST IN FEMALE, ESTROGEN RECEPTOR POSITIVE: ICD-10-CM

## 2025-03-05 DIAGNOSIS — C79.51 SECONDARY CANCER OF BONE: ICD-10-CM

## 2025-03-05 DIAGNOSIS — C73 PAPILLARY THYROID CARCINOMA: ICD-10-CM

## 2025-03-05 DIAGNOSIS — C77.3 MALIGNANT NEOPLASM METASTATIC TO LYMPH NODE OF AXILLA: ICD-10-CM

## 2025-03-05 DIAGNOSIS — C50.919 PRIMARY MALIGNANT NEOPLASM OF BREAST WITH METASTASIS TO OTHER SITE, UNSPECIFIED LATERALITY: ICD-10-CM

## 2025-03-05 DIAGNOSIS — C53.0 MALIGNANT NEOPLASM OF ENDOCERVIX: ICD-10-CM

## 2025-03-05 PROCEDURE — 25500020 PHARM REV CODE 255

## 2025-03-05 PROCEDURE — 74177 CT ABD & PELVIS W/CONTRAST: CPT | Mod: 26,,, | Performed by: RADIOLOGY

## 2025-03-05 PROCEDURE — 71260 CT THORAX DX C+: CPT | Mod: 26,,, | Performed by: RADIOLOGY

## 2025-03-05 PROCEDURE — 74177 CT ABD & PELVIS W/CONTRAST: CPT | Mod: TC

## 2025-03-05 RX ADMIN — IOHEXOL 30 ML: 350 INJECTION, SOLUTION INTRAVENOUS at 09:03

## 2025-03-05 RX ADMIN — IOHEXOL 100 ML: 350 INJECTION, SOLUTION INTRAVENOUS at 10:03

## 2025-03-07 ENCOUNTER — RESULTS FOLLOW-UP (OUTPATIENT)
Dept: HEMATOLOGY/ONCOLOGY | Facility: CLINIC | Age: 55
End: 2025-03-07

## 2025-03-12 ENCOUNTER — INFUSION (OUTPATIENT)
Dept: INFUSION THERAPY | Facility: HOSPITAL | Age: 55
End: 2025-03-12
Attending: INTERNAL MEDICINE
Payer: MEDICAID

## 2025-03-12 ENCOUNTER — LAB VISIT (OUTPATIENT)
Dept: LAB | Facility: HOSPITAL | Age: 55
End: 2025-03-12
Attending: INTERNAL MEDICINE
Payer: MEDICAID

## 2025-03-12 ENCOUNTER — OFFICE VISIT (OUTPATIENT)
Dept: HEMATOLOGY/ONCOLOGY | Facility: CLINIC | Age: 55
End: 2025-03-12
Payer: MEDICAID

## 2025-03-12 VITALS — DIASTOLIC BLOOD PRESSURE: 81 MMHG | TEMPERATURE: 98 F | HEART RATE: 89 BPM | SYSTOLIC BLOOD PRESSURE: 116 MMHG

## 2025-03-12 VITALS
OXYGEN SATURATION: 95 % | SYSTOLIC BLOOD PRESSURE: 97 MMHG | HEIGHT: 67 IN | BODY MASS INDEX: 37.77 KG/M2 | TEMPERATURE: 98 F | DIASTOLIC BLOOD PRESSURE: 64 MMHG | RESPIRATION RATE: 16 BRPM | WEIGHT: 240.63 LBS | HEART RATE: 92 BPM

## 2025-03-12 DIAGNOSIS — Z17.0 MALIGNANT NEOPLASM OF OVERLAPPING SITES OF RIGHT BREAST IN FEMALE, ESTROGEN RECEPTOR POSITIVE: Primary | ICD-10-CM

## 2025-03-12 DIAGNOSIS — C50.919 PRIMARY MALIGNANT NEOPLASM OF BREAST WITH METASTASIS TO OTHER SITE, UNSPECIFIED LATERALITY: ICD-10-CM

## 2025-03-12 DIAGNOSIS — C77.3 MALIGNANT NEOPLASM METASTATIC TO LYMPH NODE OF AXILLA: ICD-10-CM

## 2025-03-12 DIAGNOSIS — R51.9 NONINTRACTABLE HEADACHE, UNSPECIFIED CHRONICITY PATTERN, UNSPECIFIED HEADACHE TYPE: ICD-10-CM

## 2025-03-12 DIAGNOSIS — C50.811 MALIGNANT NEOPLASM OF OVERLAPPING SITES OF RIGHT BREAST IN FEMALE, ESTROGEN RECEPTOR POSITIVE: Primary | ICD-10-CM

## 2025-03-12 LAB
ALBUMIN SERPL BCP-MCNC: 3.7 G/DL (ref 3.5–5.2)
ALP SERPL-CCNC: 74 U/L (ref 40–150)
ALT SERPL W/O P-5'-P-CCNC: 34 U/L (ref 10–44)
ANION GAP SERPL CALC-SCNC: 9 MMOL/L (ref 8–16)
AST SERPL-CCNC: 29 U/L (ref 10–40)
BASOPHILS # BLD AUTO: 0.11 K/UL (ref 0–0.2)
BASOPHILS NFR BLD: 1.2 % (ref 0–1.9)
BILIRUB SERPL-MCNC: 0.3 MG/DL (ref 0.1–1)
BUN SERPL-MCNC: 15 MG/DL (ref 6–20)
CALCIUM SERPL-MCNC: 9.2 MG/DL (ref 8.7–10.5)
CHLORIDE SERPL-SCNC: 103 MMOL/L (ref 95–110)
CO2 SERPL-SCNC: 24 MMOL/L (ref 23–29)
CREAT SERPL-MCNC: 1.6 MG/DL (ref 0.5–1.4)
DIFFERENTIAL METHOD BLD: ABNORMAL
EOSINOPHIL # BLD AUTO: 0.2 K/UL (ref 0–0.5)
EOSINOPHIL NFR BLD: 2.4 % (ref 0–8)
ERYTHROCYTE [DISTWIDTH] IN BLOOD BY AUTOMATED COUNT: 14.5 % (ref 11.5–14.5)
EST. GFR  (NO RACE VARIABLE): 38 ML/MIN/1.73 M^2
GLUCOSE SERPL-MCNC: 124 MG/DL (ref 70–110)
HCT VFR BLD AUTO: 38.7 % (ref 37–48.5)
HGB BLD-MCNC: 13.1 G/DL (ref 12–16)
IMM GRANULOCYTES # BLD AUTO: 0.15 K/UL (ref 0–0.04)
IMM GRANULOCYTES NFR BLD AUTO: 1.6 % (ref 0–0.5)
INFLUENZA A, MOLECULAR: NEGATIVE
INFLUENZA B, MOLECULAR: NEGATIVE
LDH SERPL L TO P-CCNC: 201 U/L (ref 110–260)
LYMPHOCYTES # BLD AUTO: 2.2 K/UL (ref 1–4.8)
LYMPHOCYTES NFR BLD: 24.1 % (ref 18–48)
MAGNESIUM SERPL-MCNC: 2 MG/DL (ref 1.6–2.6)
MCH RBC QN AUTO: 34.9 PG (ref 27–31)
MCHC RBC AUTO-ENTMCNC: 33.9 G/DL (ref 32–36)
MCV RBC AUTO: 103 FL (ref 82–98)
MONOCYTES # BLD AUTO: 0.8 K/UL (ref 0.3–1)
MONOCYTES NFR BLD: 8.8 % (ref 4–15)
NEUTROPHILS # BLD AUTO: 5.7 K/UL (ref 1.8–7.7)
NEUTROPHILS NFR BLD: 61.9 % (ref 38–73)
NRBC BLD-RTO: 0 /100 WBC
PHOSPHATE SERPL-MCNC: 2.7 MG/DL (ref 2.7–4.5)
PLATELET # BLD AUTO: 226 K/UL (ref 150–450)
PMV BLD AUTO: 11.1 FL (ref 9.2–12.9)
POTASSIUM SERPL-SCNC: 3.7 MMOL/L (ref 3.5–5.1)
PROT SERPL-MCNC: 7 G/DL (ref 6–8.4)
RBC # BLD AUTO: 3.75 M/UL (ref 4–5.4)
SARS-COV-2 RDRP RESP QL NAA+PROBE: NEGATIVE
SODIUM SERPL-SCNC: 136 MMOL/L (ref 136–145)
SPECIMEN SOURCE: NORMAL
T4 FREE SERPL-MCNC: 0.64 NG/DL (ref 0.71–1.51)
TSH SERPL DL<=0.005 MIU/L-ACNC: 32.55 UIU/ML (ref 0.4–4)
WBC # BLD AUTO: 9.17 K/UL (ref 3.9–12.7)

## 2025-03-12 PROCEDURE — 85025 COMPLETE CBC W/AUTO DIFF WBC: CPT | Performed by: INTERNAL MEDICINE

## 2025-03-12 PROCEDURE — 99215 OFFICE O/P EST HI 40 MIN: CPT | Mod: PBBFAC,25 | Performed by: INTERNAL MEDICINE

## 2025-03-12 PROCEDURE — 63600175 PHARM REV CODE 636 W HCPCS

## 2025-03-12 PROCEDURE — 99999 PR PBB SHADOW E&M-EST. PATIENT-LVL V: CPT | Mod: PBBFAC,,, | Performed by: INTERNAL MEDICINE

## 2025-03-12 PROCEDURE — 83615 LACTATE (LD) (LDH) ENZYME: CPT | Performed by: INTERNAL MEDICINE

## 2025-03-12 PROCEDURE — 84100 ASSAY OF PHOSPHORUS: CPT | Performed by: INTERNAL MEDICINE

## 2025-03-12 PROCEDURE — 84443 ASSAY THYROID STIM HORMONE: CPT | Performed by: INTERNAL MEDICINE

## 2025-03-12 PROCEDURE — 87502 INFLUENZA DNA AMP PROBE: CPT | Performed by: INTERNAL MEDICINE

## 2025-03-12 PROCEDURE — 83735 ASSAY OF MAGNESIUM: CPT | Performed by: INTERNAL MEDICINE

## 2025-03-12 PROCEDURE — 96523 IRRIG DRUG DELIVERY DEVICE: CPT

## 2025-03-12 PROCEDURE — 87635 SARS-COV-2 COVID-19 AMP PRB: CPT | Performed by: INTERNAL MEDICINE

## 2025-03-12 PROCEDURE — 36415 COLL VENOUS BLD VENIPUNCTURE: CPT | Performed by: INTERNAL MEDICINE

## 2025-03-12 PROCEDURE — 80053 COMPREHEN METABOLIC PANEL: CPT | Performed by: INTERNAL MEDICINE

## 2025-03-12 PROCEDURE — 84439 ASSAY OF FREE THYROXINE: CPT | Performed by: INTERNAL MEDICINE

## 2025-03-12 RX ORDER — SODIUM CHLORIDE 0.9 % (FLUSH) 0.9 %
10 SYRINGE (ML) INJECTION
Status: DISCONTINUED | OUTPATIENT
Start: 2025-03-12 | End: 2025-03-12 | Stop reason: HOSPADM

## 2025-03-12 RX ORDER — HEPARIN 100 UNIT/ML
500 SYRINGE INTRAVENOUS
Status: DISCONTINUED | OUTPATIENT
Start: 2025-03-12 | End: 2025-03-12 | Stop reason: HOSPADM

## 2025-03-12 RX ORDER — SODIUM CHLORIDE 0.9 % (FLUSH) 0.9 %
10 SYRINGE (ML) INJECTION
OUTPATIENT
Start: 2025-03-12

## 2025-03-12 RX ORDER — HEPARIN 100 UNIT/ML
500 SYRINGE INTRAVENOUS
OUTPATIENT
Start: 2025-03-12

## 2025-03-12 RX ADMIN — HEPARIN SODIUM (PORCINE) LOCK FLUSH IV SOLN 100 UNIT/ML 500 UNITS: 100 SOLUTION at 12:03

## 2025-03-12 NOTE — PROGRESS NOTES
[facility]  [address] [address] [address]  Phone: [phone];  Fax: [phone]    Patient ID: Josey Flores   Reason for Visit: Follow-up  MRN:  [id number]     Oncologic Diagnosis:    - Stage IV cervical squamous cell carcinoma  - Stage IV, T2 N1b M1, invasive breast carcinoma, ER/ID+, HER2-  - Papillary thyroid carcinoma status post total thyroidectomy on 08/31/2017, mpT1b N0   - Cervical HSIL MIREILLE 3 s/p LEEP, 2017  Previous Treatment:    - Palliative chemotherapy: cisplatin, paclitaxel and bevacizumab; 02/22/2021 cycle 1 day 1 -> bevacizumab maintenance after 6 cycles  - Carboplatin, paclitaxel and pembrolizumab, C1D1 1/28/22; maintenance pembrolizumab, d/c 9/2022  Current Treatment:    - Letrozole and palbociclib   Subjective   Josey Flores is a 54 y.o. female who presents to clinic for follow up and is accompanied by her spouse.      She reports that she is not feeling well.  She has been having 3 days of low appetite, fatigue, nausea, cough and headaches.  She has no sick contacts.  Her BP is borderline. Recommend we do viral testing and conservative management.  She is in agreement.    Review of Systems   Constitutional:  Positive for appetite change (decreased) and fatigue. Negative for activity change, chills, diaphoresis, fever and unexpected weight change.   Respiratory:  Positive for cough. Negative for shortness of breath.    Cardiovascular:  Negative for chest pain.   Gastrointestinal:  Positive for nausea. Negative for abdominal distention, abdominal pain, anal bleeding, blood in stool, constipation, diarrhea and vomiting.   Genitourinary:  Negative for difficulty urinating and hematuria.   Musculoskeletal:  Positive for arthralgias. Negative for back pain and myalgias.   Skin:  Negative for rash.   Neurological:  Positive for headaches. Negative for dizziness, weakness and light-headedness.   Hematological:  Does not bruise/bleed easily.    Psychiatric/Behavioral:  Positive for sleep disturbance. The patient is not nervous/anxious.      History     Oncology History   Secondary cancer of bone    Chemotherapy    Treatment Summary   Plan Name: OP GYN PACLITAXEL CISPLATIN BEVACIZUMAB Q3W  Treatment Goal: Control  Status: Inactive  Start Date: 2/22/2021  End Date: 7/7/2021  Provider: Estefani Asencio MD  Chemotherapy: CISplatin (PLATINOL) 50 mg/m2 = 108 mg in sodium chloride 0.9% 608 mL chemo infusion, 50 mg/m2 = 108 mg, Intravenous, Clinic/HOD 1 time, 6 of 6 cycles  Administration: 108 mg (2/22/2021), 108 mg (3/15/2021), 108 mg (4/6/2021), 108 mg (5/11/2021), 108 mg (6/1/2021), 108 mg (6/22/2021)    bevacizumab (AVASTIN) 15 mg/kg = 1,480 mg in sodium chloride 0.9% 100 mL chemo infusion, 15 mg/kg = 1,480 mg, Intravenous, Clinic/HOD 1 time, 6 of 6 cycles  Administration: 1,480 mg (4/6/2021), 1,480 mg (2/23/2021), 1,480 mg (3/16/2021), 1,480 mg (6/1/2021), 1,480 mg (6/22/2021), 1,480 mg (5/12/2021)    PACLitaxeL (TAXOL) 175 mg/m2 = 378 mg in sodium chloride 0.9% 563 mL chemo infusion, 175 mg/m2 = 378 mg, Intravenous, Clinic/HOD 1 time, 6 of 6 cycles  Administration: 378 mg (2/22/2021), 378 mg (3/15/2021), 378 mg (4/6/2021), 378 mg (5/11/2021), 378 mg (6/1/2021), 378 mg (6/22/2021)    Plan Name: OP BEVACIZUMAB Q3W   Treatment Goal: Maintenance  Status: Inactive  Start Date: 7/13/2021  End Date: 12/28/2021  Provider: Estefani Asencio MD  Chemotherapy: bevacizumab (AVASTIN) 1,300 mg in sodium chloride 0.9% 100 mL chemo infusion, 1,345 mg, Intravenous, Clinic/HOD 1 time, 9 of 17 cycles  Administration: 1,300 mg (7/13/2021), 1,345 mg (8/3/2021), 1,300 mg (8/24/2021), 1,300 mg (9/14/2021), 1,345 mg (10/5/2021), 1,345 mg (10/26/2021), 1,345 mg (11/16/2021), 1,300 mg (12/7/2021), 1,300 mg (12/28/2021)    Plan Name: OP CARBOPLATIN PACLITAXEL PEMBROLIZUMAB Q3W FOLLOWED BY MAINTENANCE PEMBROLIZUMAB 400 MG Q6W  Treatment Goal: Control  Status: Inactive  Start  Date: 1/28/2022  End Date: 8/11/2022  Provider: Estefani Asencio MD  Chemotherapy: CARBOplatin (PARAPLATIN) 425 mg in sodium chloride 0.9% 292.5 mL chemo infusion, 425 mg, Intravenous, Clinic/HOD 1 time, 6 of 6 cycles  Dose modification:   (original dose 424 mg, Cycle 6),   (original dose 424 mg, Cycle 6)  Administration: 425 mg (1/28/2022), 495 mg (2/18/2022), 525 mg (6/2/2022), 525 mg (5/12/2022), 525 mg (3/11/2022), 495 mg (4/11/2022)    PACLitaxeL (TAXOL) 175 mg/m2 = 366 mg in sodium chloride 0.9% 561 mL chemo infusion, 175 mg/m2 = 366 mg (100 % of original dose 175 mg/m2), Intravenous, Clinic/HOD 1 time, 6 of 6 cycles  Dose modification: 175 mg/m2 (original dose 175 mg/m2, Cycle 1), 175 mg/m2 (original dose 175 mg/m2, Cycle 4)  Administration: 366 mg (1/28/2022), 366 mg (2/18/2022), 366 mg (3/11/2022), 366 mg (4/11/2022), 366 mg (5/12/2022), 366 mg (6/2/2022)       Malignant neoplasm of endocervix   9/10/2020 Imaging Significant Findings    CT A/P: Negative for ascites, omental caking, lymphadenopathy, or a gross cervical mass     12/7/2020 Biopsy    EMBX: SCC, p16+     1/12/2021 Imaging Significant Findings    MRI Pelvis w/ w/o  4.9 cm cervical mass  R parametrial invasion  R external iliac lymphadenopathy     1/27/2021 Imaging Significant Findings    PET/CT: FDG avidity in the chest     2/18/2021 Biopsy    Endoscopic biopsy of the lung: +      2/22/2021 - 6/29/2021 Chemotherapy    Cisplatin/paclitaxel/avastin x6     3/12/2021 Genetic Testing    STRATA: FRANK, PIK3c, Ep300, ERBB3, KDM6A     4/24/2021 Imaging Significant Findings    CT Pelvis w/: JUAN FRANCISCO with a normal cervix     4/26/2021 Imaging Significant Findings    PET/CT: No FDG avidity     5/18/2021 Imaging Significant Findings    CT A/P w/: JUAN FRANCISCO     6/5/2021 Imaging Significant Findings    CT A/P w/: JUAN FRANCISCO     7/12/2021 Imaging Significant Findings    PET/CT: JUAN FRANCISCO     7/13/2021 - 12/28/2021 Maintenance Therapy    Avastin x 9     10/20/2021 Imaging Significant  Findings    PET/CT: JUAN FRANCISCO     1/11/2022 Imaging Significant Findings    PET/CT: Increased FDG avidity in the cervix.  No other evidence of disease.     1/28/2022 - 6/2/2022 Chemotherapy    Carboplatin/paclitaxel/pembrolizumab x 6     3/25/2022 Imaging Significant Findings    PET/CT: Persistent FDG avidity in the cervix.  New FDG avidity in the inguinal lymph nodes (although it does not look suspicious)     4/25/2022 - 4/29/2022 Radiation Therapy    Treating physician: Dr. Vázquez (MRI guided T&O)  Total Dose: 30 Gy  Fractions: 5     6/30/2022 - 8/11/2022 Maintenance Therapy    Pembrolizumab 400 mg IV x2     8/5/2022 Imaging Significant Findings    PET/CT: JUAN FRANCISCO     10/16/2022 Imaging Significant Findings    CT A/P w/ (Women's without imaging available): JUAN FRANCISCO     1/11/2023 Imaging Significant Findings    PET/CT: JUAN FRANCISCO      Chemotherapy    Treatment Summary   Plan Name: OP GYN PACLITAXEL CISPLATIN BEVACIZUMAB Q3W  Treatment Goal: Control  Status: Inactive  Start Date: 2/22/2021  End Date: 7/7/2021  Provider: Estefani Asencio MD  Chemotherapy: CISplatin (PLATINOL) 50 mg/m2 = 108 mg in sodium chloride 0.9% 608 mL chemo infusion, 50 mg/m2 = 108 mg, Intravenous, Clinic/HOD 1 time, 6 of 6 cycles  Administration: 108 mg (2/22/2021), 108 mg (3/15/2021), 108 mg (4/6/2021), 108 mg (5/11/2021), 108 mg (6/1/2021), 108 mg (6/22/2021)    bevacizumab (AVASTIN) 15 mg/kg = 1,480 mg in sodium chloride 0.9% 100 mL chemo infusion, 15 mg/kg = 1,480 mg, Intravenous, Clinic/HOD 1 time, 6 of 6 cycles  Administration: 1,480 mg (4/6/2021), 1,480 mg (2/23/2021), 1,480 mg (3/16/2021), 1,480 mg (6/1/2021), 1,480 mg (6/22/2021), 1,480 mg (5/12/2021)    PACLitaxeL (TAXOL) 175 mg/m2 = 378 mg in sodium chloride 0.9% 563 mL chemo infusion, 175 mg/m2 = 378 mg, Intravenous, Clinic/[facility] 1 time, 6 of 6 cycles  Administration: 378 mg (2/22/2021), 378 mg (3/15/2021), 378 mg (4/6/2021), 378 mg (5/11/2021), 378 mg (6/1/2021), 378 mg (6/22/2021)    Plan Name: OP  BEVACIZUMAB Q3W   Treatment Goal: Maintenance  Status: Inactive  Start Date: 7/13/2021  End Date: 12/28/2021  Provider: Estefani Asencio MD  Chemotherapy: bevacizumab (AVASTIN) 1,300 mg in sodium chloride 0.9% 100 mL chemo infusion, 1,345 mg, Intravenous, Clinic/HOD 1 time, 9 of 17 cycles  Administration: 1,300 mg (7/13/2021), 1,345 mg (8/3/2021), 1,300 mg (8/24/2021), 1,300 mg (9/14/2021), 1,345 mg (10/5/2021), 1,345 mg (10/26/2021), 1,345 mg (11/16/2021), 1,300 mg (12/7/2021), 1,300 mg (12/28/2021)    Plan Name: OP CARBOPLATIN PACLITAXEL PEMBROLIZUMAB Q3W FOLLOWED BY MAINTENANCE PEMBROLIZUMAB 400 MG Q6W  Treatment Goal: Control  Status: Inactive  Start Date: 1/28/2022  End Date: 8/11/2022  Provider: Estefani Asencio MD  Chemotherapy: CARBOplatin (PARAPLATIN) 425 mg in sodium chloride 0.9% 292.5 mL chemo infusion, 425 mg, Intravenous, Clinic/HOD 1 time, 6 of 6 cycles  Dose modification:   (original dose 424 mg, Cycle 6),   (original dose 424 mg, Cycle 6)  Administration: 425 mg (1/28/2022), 495 mg (2/18/2022), 525 mg (6/2/2022), 525 mg (5/12/2022), 525 mg (3/11/2022), 495 mg (4/11/2022)    PACLitaxeL (TAXOL) 175 mg/m2 = 366 mg in sodium chloride 0.9% 561 mL chemo infusion, 175 mg/m2 = 366 mg (100 % of original dose 175 mg/m2), Intravenous, Clinic/HOD 1 time, 6 of 6 cycles  Dose modification: 175 mg/m2 (original dose 175 mg/m2, Cycle 1), 175 mg/m2 (original dose 175 mg/m2, Cycle 4)  Administration: 366 mg (1/28/2022), 366 mg (2/18/2022), 366 mg (3/11/2022), 366 mg (4/11/2022), 366 mg (5/12/2022), 366 mg (6/2/2022)             Past Medical History:   Diagnosis Date    Anxiety     Asthma     Breast cancer 2017    Cancer     right breast, metastatic-lymph nodes, bone, and thyroid     COPD (chronic obstructive pulmonary disease)     Depression     History of psychiatric hospitalization     2000; suicidal ideation    Hx of psychiatric care     Hypothyroidism     Miscarriage     five miscarriages    Obesity      Psychiatric problem     Thyroid cancer 2017       Past Surgical History:   Procedure Laterality Date    ABSCESS DRAINAGE      bilateral axilla    ADENOIDECTOMY      APPENDECTOMY      BREAST BIOPSY Right     x3    CHOLECYSTECTOMY      COLONOSCOPY N/A 8/5/2024    Procedure: COLONOSCOPY cc/bt done on 7/22/24 Dr. cEkert;  Surgeon: Doreen Beckman MD;  Location: [facility] ENDO;  Service: Endoscopy;  Laterality: N/A;    ENDOBRONCHIAL ULTRASOUND Bilateral 02/18/2021    Procedure: ENDOBRONCHIAL ULTRASOUND (EBUS);  Surgeon: Jaron Ni MD;  Location: [facility] ENDO;  Service: Pulmonary;  Laterality: Bilateral;    FLUOROSCOPY N/A 01/28/2021    Procedure: Mediport placement;  Surgeon: Noah Phelan MD;  Location: [facility] CATH LAB;  Service: General;  Laterality: N/A;    MASS EXCISION      MEDIPORT INSERTION, SINGLE Right 02/2021    SKIN GRAFT  06/16/2017    THYROIDECTOMY Bilateral     TONSILLECTOMY         Family History   Problem Relation Name Age of Onset    Arthritis Mother Beatrice     Early death Mother Beatrice         64 at time of death    Heart attack Mother Beatrice     Heart disease Mother Beatrice     Heart failure Mother Beatrice     Hypertension Mother Beatrice     Coronary artery disease Father David     Hearing loss Father David     Heart disease Father David     Hyperlipidemia Father David     Hypertension Father David     Mental illness Father David     Learning disabilities Son Keyur     Cancer Maternal Aunt         Review of patient's allergies indicates:   Allergen Reactions    Gabapentin Swelling     Note: unilateral joint edema, unclear if due to gabapentin    Nsaids (non-steroidal anti-inflammatory drug) Other (See Comments)     Instructed not to take NSAID drugs with chemo pill.    Zometa [zoledronic acid] Other (See Comments)     Possible osteonecrosis jaw    Clindamycin Rash    Vancomycin analogues Itching       Social History     Tobacco Use    Smoking status: Every Day     Current  packs/day: 1.00     Average packs/day: 1 pack/day for 40.2 years (40.2 ttl pk-yrs)     Types: Cigarettes     Start date: 1/1/1985     Passive exposure: Current    Smokeless tobacco: Never    Tobacco comments:     45 pack year history   Substance Use Topics    Alcohol use: No    Drug use: No       Physical Exam     ECOG SCORE    0 - Fully active-able to carry on all pre-disease performance without restriction         Vitals:    03/12/25 1340   BP: 97/64   Pulse: 92   Resp: 16   Temp: 97.6 °F (36.4 °C)       Physical Exam  Constitutional:       General: She is not in acute distress.     Appearance: Normal appearance.   HENT:      Head: Normocephalic and atraumatic.   Eyes:      Extraocular Movements: Extraocular movements intact.      Conjunctiva/sclera: Conjunctivae normal.   Cardiovascular:      Rate and Rhythm: Normal rate.   Pulmonary:      Effort: Pulmonary effort is normal. No respiratory distress.   Abdominal:      General: There is no distension.      Palpations: Abdomen is soft. There is no hepatomegaly or splenomegaly.      Tenderness: There is no abdominal tenderness. There is no guarding.   Skin:     Findings: No rash.   Neurological:      General: No focal deficit present.      Mental Status: She is alert.   Psychiatric:         Mood and Affect: Mood normal.         Behavior: Behavior normal.         Thought Content: Thought content normal.       Imaging     CT CHEST ABDOMEN PELVIS WITH IV CONTRAST (XPD) - 08/14/2024     CLINICAL INDICATION: Metastatic disease evaluation     TECHNIQUE: Axial and multiplanar 2-D reformations provided.  With IV contrast     PRIORS: April 2024     FINDINGS:  1.  Emphysema  2.  Right upper lobe pulmonary nodule (series 2/40) stable  3.  Right upper lobe calcified granuloma with right hilar, mediastinal and splenic calcifications all consistent with old granulomatous disease, stable  4.  Early coronary artery calcifications  5.  Hepatomegaly with probable fatty  "infiltration  6.  Cholecystectomy clips without complication  7.  Inferior left renal 18 mm hypodensity consistent with simple cyst, stable  8.  Generalized atherosclerosis without aneurysm  9.  No evidence for appendicitis  10.  Diffuse thoracolumbar spondylosis without vertebral body fractures nor focal bone abnormalities        Impression:     1.  I see no evidence to suggest metastatic disease in the chest, abdomen nor pelvis  2.  Emphysema is present; it is an independent risk factor for lung cancer. Recommend patient be evaluated for low dose cancer screening protocol.  3.  Stable right upper lobe pulmonary nodule as described above  4.  Coronary artery calcifications  5.  Fatty liver  6.  Left renal simple cyst (no further follow-up nor evaluation recommended for this isolated abnormality)     All CT scans at [this location] are performed using dose modulation techniques as appropriate to a performed exam including the following:  Automated exposure control; adjustment of the mA and/or kV according to patient size (this includes techniques or standardized protocols for targeted exams where dose is matched to indication / reason for exam; i.e. extremities or head); use of iterative reconstruction technique.    Physical Exam   ECOG:   ECOG SCORE    0 - Fully active-able to carry on all pre-disease performance without restriction          Vitals:  BP 97/64   Pulse 92   Temp 97.6 °F (36.4 °C) (Temporal)   Resp 16   Ht 5' 7" (1.702 m)   Wt 109.1 kg (240 lb 10.1 oz)   LMP 12/05/2020 Comment: no cycle x3 years  SpO2 95%   BMI 37.69 kg/m²     Physical Exam:  Physical Exam  Constitutional:       General: She is not in acute distress.     Appearance: Normal appearance.   HENT:      Head: Normocephalic and atraumatic.   Eyes:      Extraocular Movements: Extraocular movements intact.      Conjunctiva/sclera: Conjunctivae normal.   Cardiovascular:      Rate and Rhythm: Normal rate.   Pulmonary:      Effort: " Pulmonary effort is normal. No respiratory distress.   Abdominal:      General: There is no distension.      Palpations: Abdomen is soft. There is no hepatomegaly or splenomegaly.      Tenderness: There is no abdominal tenderness. There is no guarding.   Skin:     Findings: No rash.   Neurological:      General: No focal deficit present.      Mental Status: She is alert.   Psychiatric:         Mood and Affect: Mood normal.         Behavior: Behavior normal.         Thought Content: Thought content normal.          Labs   Labs:  Office Visit on 03/12/2025   Component Date Value Ref Range Status    SARS-CoV-2 RNA, Amplification, Qual 03/12/2025 Negative  Negative Final    Comment: This test utilizes isothermal nucleic acid amplification technology   to   detect the SARS-CoV-2 RdRp nucleic acid segment. The analytical   sensitivity   (limit of detection) is 500 copies/swab.    A POSITIVE result is indicative of the presence of SARS-CoV-2 RNA;   clinical   correlation with patient history and other diagnostic information is   necessary to determine patient infection status.    A NEGATIVE result means that SARS-CoV-2 nucleic acids are not present   above   the limit of detection. A NEGATIVE result should be treated as   presumptive.   It does not rule out the possibility of COVID-19 and should not be   the sole   basis for treatment decisions.    If COVID-19 is strongly suspected based on clinical and exposure   history,   re-testing using an alternate molecular assay should be considered.    This test is Food and Drug Administration (FDA) approved. Performance   characteristics of this has been independently verified by [facility] Department of Pat                           hology and Laboratory Medicine.      Influenza A, Molecular 03/12/2025 Negative  Negative Final    Influenza B, Molecular 03/12/2025 Negative  Negative Final    Flu A & B Source 03/12/2025 NP   Final   Lab Visit on 03/12/2025    Component Date Value Ref Range Status    Phosphorus 03/12/2025 2.7  2.7 - 4.5 mg/dL Final    Magnesium 03/12/2025 2.0  1.6 - 2.6 mg/dL Final    Sodium 03/12/2025 136  136 - 145 mmol/L Final    Potassium 03/12/2025 3.7  3.5 - 5.1 mmol/L Final    Chloride 03/12/2025 103  95 - 110 mmol/L Final    CO2 03/12/2025 24  23 - 29 mmol/L Final    Glucose 03/12/2025 124 (H)  70 - 110 mg/dL Final    BUN 03/12/2025 15  6 - 20 mg/dL Final    Creatinine 03/12/2025 1.6 (H)  0.5 - 1.4 mg/dL Final    Calcium 03/12/2025 9.2  8.7 - 10.5 mg/dL Final    Total Protein 03/12/2025 7.0  6.0 - 8.4 g/dL Final    Albumin 03/12/2025 3.7  3.5 - 5.2 g/dL Final    Total Bilirubin 03/12/2025 0.3  0.1 - 1.0 mg/dL Final    Comment: For infants and newborns, interpretation of results should be based  on gestational age, weight and in agreement with clinical  observations.    Premature Infant recommended reference ranges:  Up to 24 hours.............<8.0 mg/dL  Up to 48 hours............<12.0 mg/dL  3-5 days..................<15.0 mg/dL  6-29 days.................<15.0 mg/dL      Alkaline Phosphatase 03/12/2025 74  40 - 150 U/L Final    AST 03/12/2025 29  10 - 40 U/L Final    ALT 03/12/2025 34  10 - 44 U/L Final    eGFR 03/12/2025 38 (A)  >60 mL/min/1.73 m^2 Final    Anion Gap 03/12/2025 9  8 - 16 mmol/L Final    WBC 03/12/2025 9.17  3.90 - 12.70 K/uL Final    RBC 03/12/2025 3.75 (L)  4.00 - 5.40 M/uL Final    Hemoglobin 03/12/2025 13.1  12.0 - 16.0 g/dL Final    Hematocrit 03/12/2025 38.7  37.0 - 48.5 % Final    MCV 03/12/2025 103 (H)  82 - 98 fL Final    MCH 03/12/2025 34.9 (H)  27.0 - 31.0 pg Final    MCHC 03/12/2025 33.9  32.0 - 36.0 g/dL Final    RDW 03/12/2025 14.5  11.5 - 14.5 % Final    Platelets 03/12/2025 226  150 - 450 K/uL Final    MPV 03/12/2025 11.1  9.2 - 12.9 fL Final    Immature Granulocytes 03/12/2025 1.6 (H)  0.0 - 0.5 % Final    Gran # (ANC) 03/12/2025 5.7  1.8 - 7.7 K/uL Final    Immature Grans (Abs) 03/12/2025 0.15 (H)  0.00 -  0.04 K/uL Final    Comment: Mild elevation in immature granulocytes is non specific and   can be seen in a variety of conditions including stress response,   acute inflammation, trauma and pregnancy. Correlation with other   laboratory and clinical findings is essential.      Lymph # 03/12/2025 2.2  1.0 - 4.8 K/uL Final    Mono # 03/12/2025 0.8  0.3 - 1.0 K/uL Final    Eos # 03/12/2025 0.2  0.0 - 0.5 K/uL Final    Baso # 03/12/2025 0.11  0.00 - 0.20 K/uL Final    nRBC 03/12/2025 0  0 /100 WBC Final    Gran % 03/12/2025 61.9  38.0 - 73.0 % Final    Lymph % 03/12/2025 24.1  18.0 - 48.0 % Final    Mono % 03/12/2025 8.8  4.0 - 15.0 % Final    Eosinophil % 03/12/2025 2.4  0.0 - 8.0 % Final    Basophil % 03/12/2025 1.2  0.0 - 1.9 % Final    Differential Method 03/12/2025 Automated   Final    LD 03/12/2025 201  110 - 260 U/L Final    Results are increased in hemolyzed samples.    TSH 03/12/2025 32.552 (H)  0.400 - 4.000 uIU/mL Final    Free T4 03/12/2025 0.64 (L)  0.71 - 1.51 ng/dL Final        Imaging   CT Chest Abdomen Without Contrast (XPD)  - 01/13/2024  Impression:  Stable exam.  7 mm right nodule, unchanged.  Calcified granuloma with calcified right hilar lymph nodes.  Fatty infiltration of the liver.  Status post cholecystectomy.         CT CHEST ABDOMEN PELVIS WITHOUT CONTRAST(XPD) - 04/15/24  COMPARISON:  01/13/2024     FINDINGS:  CT of chest without contrast:     The pulmonary window images demonstrate stable appearance of a calcified granuloma located laterally in the right upper lobe and a linear density more characteristic of localized pulmonary scarring also seen laterally in the right upper lobe (axial image 179).  Centrilobular emphysema is also visible in the upper lobes bilaterally, more severe on the right.  No pleural effusion or lung consolidation is appreciated.  No pathologic lymph node enlargement is visible in the mediastinum or axilla bilaterally.  Dense lymph node calcification associated with  granulomatous disease is again noted in the mediastinum and right hilum.  Heart size is normal.  No chest wall abnormalities are appreciated except for evidence of apparent prior breast biopsy on the right.  A MediPort is also seen entering the right internal jugular vein.     CT of abdomen and pelvis without contrast:     The liver is enlarged measuring 22.7 cm in its craniocaudal dimension.  Decreased attenuation is also visible in the liver parenchyma consistent with generalized hepatic steatosis.  The spleen appears normal except for multiple incidental punctate granulomatous calcifications.  The pancreas and adrenals appear normal.  The gallbladder is surgically absent.  No parenchymal abnormality, hydronephrosis or intrarenal calculi are visible in either kidney.  No free intraperitoneal fluid or lymph node enlargement is identified.     Images obtained through the pelvis demonstrate no gross abnormality in an incompletely distended urinary bladder.  The uterus and ovaries are surgically absent.  No free pelvic fluid or abnormal soft tissue masses are identified.     Impression:  Emphysematous changes, most severe in the right upper lobe.  Stable faint noncalcified nodular focal pulmonary scarring in the right upper lobe compared to 01/13/2024.  Hepatomegaly and generalized hepatic steatosis.  Status post cholecystectomy and hysterectomy.    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   Dec 2020 patient had vaginal bleeding and MRI pelvis showed LAD  TB discussion 01/29/21: consensus for systemic chemotherapy given advanced  cervical squamous cell carcinoma with cisplatin, paclitaxel and bevacizumab with discontinuation of palbociclib but can continue aromatase inhibitor; taxane may also be active for her concomitant metastatic breast cancer.   Renetta genetic testing negative  Treated with 6C (initiated 02/21/21) of Cis/Paclitaxel/Karina with improvement of disease and started on maintenance Karina   PET-CT January 2022 notable for uptake in cervical region concerning for oligo progression/recurrence of her disease.  Previously biopsy proven metastatic cervical cancer in lungs without evidence of recurrent mass/lymphadenopathy/avidity in this region or otherwise.  Treated with chemo-immunotherapy (C1 Pembrolizumab and Carbo/Paclitaxel)  and vaginal brachy therapy(done at [facility])  Restaging scans after this showed no evidence of recurrence and decision to hold therapy made; she was resumed on Ibrance for metastatic breast cancer  Continue with surveillance imaging       Chronic Medical Conditions  Hx of CVA June 2023  COPD  History of papillary thyroid carcinoma status post total thyroidectomy on 08/31/2017:   s/p total thyroidectomy on levothyroxine.   Hypothyroidism:   Previously on levothyroxine 275 mcg daily.    TFTs normalized   Increased Synthroid to 300 mcg daily and TFTs improved.  Continue Synthroid 300 mcg daily  Continue repeat thyroid function labs for monitoring.  Refer to endocrinology   Macrocytosis:   Possibly related to thyroid disorder will continue to monitor.  She is on vitamin-B supplement.  No new anemia.  Neuropathy:     Mild, will monitor   Tobacco abuse:    Precontemplative; continued cessation encouraged; will obtain LDCT of chest for screening purposes; discussed with patient and she is in agreement    Abdominal Cramping, Resolved  Diarrhea, Resolved        Med Onc Chart Routing      Follow up with physician 4 weeks.   Follow up with MIGUEL    Infusion scheduling note    Injection scheduling note    Labs CBC, CMP,  phosphorus and magnesium   Scheduling:  Preferred lab:  Lab interval:     Imaging    Pharmacy appointment    Other referrals                  The patient was seen, interviewed and examined. Pertinent lab and radiologic studies were reviewed. Pt instructed to call should they develop concerning signs/symptoms or have further questions.        Portions of the record may have been created with voice recognition software. Occasional wrong-word or sound-a-like substitutions may have occurred due to the inherent limitations of voice recognition software. Read the chart carefully and recognize, using context, where substitutions have occurred.      Donita Nuñez MD    Hematology/Oncology

## 2025-03-13 ENCOUNTER — OFFICE VISIT (OUTPATIENT)
Dept: OPHTHALMOLOGY | Facility: CLINIC | Age: 55
End: 2025-03-13
Payer: MEDICAID

## 2025-03-13 DIAGNOSIS — H52.7 REFRACTIVE ERROR: ICD-10-CM

## 2025-03-13 DIAGNOSIS — H26.9 CORTICAL CATARACT: ICD-10-CM

## 2025-03-13 DIAGNOSIS — H34.8320 BRANCH RETINAL VEIN OCCLUSION OF LEFT EYE WITH MACULAR EDEMA: ICD-10-CM

## 2025-03-13 DIAGNOSIS — H25.813 COMBINED FORM OF AGE-RELATED CATARACT, BOTH EYES: Primary | ICD-10-CM

## 2025-03-13 PROCEDURE — 99213 OFFICE O/P EST LOW 20 MIN: CPT | Mod: PBBFAC,PO | Performed by: OPTOMETRIST

## 2025-03-13 PROCEDURE — 1159F MED LIST DOCD IN RCRD: CPT | Mod: CPTII,,, | Performed by: OPTOMETRIST

## 2025-03-13 PROCEDURE — 92134 CPTRZ OPH DX IMG PST SGM RTA: CPT | Mod: PBBFAC,PO | Performed by: OPTOMETRIST

## 2025-03-13 PROCEDURE — 99999 PR PBB SHADOW E&M-EST. PATIENT-LVL III: CPT | Mod: PBBFAC,,, | Performed by: OPTOMETRIST

## 2025-03-13 PROCEDURE — 92015 DETERMINE REFRACTIVE STATE: CPT | Mod: ,,, | Performed by: OPTOMETRIST

## 2025-03-13 PROCEDURE — 99214 OFFICE O/P EST MOD 30 MIN: CPT | Mod: S$PBB,,, | Performed by: OPTOMETRIST

## 2025-03-13 NOTE — PROGRESS NOTES
HPI     Annual Exam            Comments: Patient reports for routine eye exam.           Comments    Vision changes since last eye exam?: Yes at distance      Any eye pain today: No    Other ocular symptoms: Floaters OU but not often     Interested in contact lens fitting today? No              Last edited by Amarilis Amaya MA on 3/13/2025  9:48 AM.            Assessment /Plan     For exam results, see Encounter Report.    1. Combined form of age-related cataract, both eyes  OD>OS Pt will trial Mrx first, if no improvement consider CE/IOL    2. Branch retinal vein occlusion of left eye with macular edema  Longstanding, CME resolved at this time, pt previously seen Dr FERNANDO, was cleared for annual exam at this time. Stable on OCT.     3. Refractive error [H52.7 (ICD-10-CM)]  Eyeglass Final Rx       Eyeglass Final Rx         Sphere Cylinder Axis Add    Right -1.50   +2.50    Left -2.00 +1.00 113 +2.50      Type: PAL    Expiration Date: 3/13/2026                     RTC 1 yr for dilated eye exam or sooner if any changes to vision.   Discussed above and answered questions.

## 2025-03-17 DIAGNOSIS — C79.81 METASTATIC MALIGNANT NEOPLASM TO BREAST: Primary | ICD-10-CM

## 2025-03-17 DIAGNOSIS — G89.3 NEOPLASM RELATED PAIN: ICD-10-CM

## 2025-03-17 DIAGNOSIS — C50.811 MALIGNANT NEOPLASM OF OVERLAPPING SITES OF RIGHT BREAST IN FEMALE, ESTROGEN RECEPTOR POSITIVE: ICD-10-CM

## 2025-03-17 DIAGNOSIS — Z17.0 MALIGNANT NEOPLASM OF OVERLAPPING SITES OF RIGHT BREAST IN FEMALE, ESTROGEN RECEPTOR POSITIVE: ICD-10-CM

## 2025-03-17 DIAGNOSIS — F41.9 ANXIETY AND DEPRESSION: ICD-10-CM

## 2025-03-17 DIAGNOSIS — F32.A ANXIETY AND DEPRESSION: ICD-10-CM

## 2025-03-18 RX ORDER — HYDROCODONE BITARTRATE AND ACETAMINOPHEN 10; 325 MG/1; MG/1
1 TABLET ORAL EVERY 6 HOURS PRN
Qty: 90 TABLET | Refills: 0 | Status: SHIPPED | OUTPATIENT
Start: 2025-03-18 | End: 2025-04-17

## 2025-03-18 RX ORDER — ALPRAZOLAM 0.5 MG/1
0.5 TABLET ORAL 2 TIMES DAILY PRN
Qty: 60 TABLET | Refills: 0 | Status: SHIPPED | OUTPATIENT
Start: 2025-03-18 | End: 2025-04-17

## 2025-04-10 ENCOUNTER — LAB VISIT (OUTPATIENT)
Dept: LAB | Facility: HOSPITAL | Age: 55
End: 2025-04-10
Payer: MEDICAID

## 2025-04-10 ENCOUNTER — OFFICE VISIT (OUTPATIENT)
Dept: HEMATOLOGY/ONCOLOGY | Facility: CLINIC | Age: 55
End: 2025-04-10
Payer: MEDICAID

## 2025-04-10 VITALS
SYSTOLIC BLOOD PRESSURE: 119 MMHG | DIASTOLIC BLOOD PRESSURE: 75 MMHG | OXYGEN SATURATION: 98 % | HEART RATE: 102 BPM | HEIGHT: 67 IN | WEIGHT: 239.75 LBS | BODY MASS INDEX: 37.63 KG/M2 | TEMPERATURE: 98 F

## 2025-04-10 DIAGNOSIS — F41.9 ANXIETY AND DEPRESSION: ICD-10-CM

## 2025-04-10 DIAGNOSIS — E03.9 HYPOTHYROIDISM, UNSPECIFIED TYPE: ICD-10-CM

## 2025-04-10 DIAGNOSIS — C79.51 SECONDARY CANCER OF BONE: ICD-10-CM

## 2025-04-10 DIAGNOSIS — K52.1 CHEMOTHERAPY INDUCED DIARRHEA: ICD-10-CM

## 2025-04-10 DIAGNOSIS — F32.A ANXIETY AND DEPRESSION: ICD-10-CM

## 2025-04-10 DIAGNOSIS — G89.3 NEOPLASM RELATED PAIN: ICD-10-CM

## 2025-04-10 DIAGNOSIS — T45.1X5A CHEMOTHERAPY INDUCED DIARRHEA: ICD-10-CM

## 2025-04-10 DIAGNOSIS — C50.811 MALIGNANT NEOPLASM OF OVERLAPPING SITES OF RIGHT BREAST IN FEMALE, ESTROGEN RECEPTOR POSITIVE: ICD-10-CM

## 2025-04-10 DIAGNOSIS — C73 PAPILLARY THYROID CARCINOMA: ICD-10-CM

## 2025-04-10 DIAGNOSIS — C53.0 MALIGNANT NEOPLASM OF ENDOCERVIX: ICD-10-CM

## 2025-04-10 DIAGNOSIS — C79.51 SECONDARY CANCER OF BONE: Primary | ICD-10-CM

## 2025-04-10 DIAGNOSIS — Z17.0 MALIGNANT NEOPLASM OF OVERLAPPING SITES OF RIGHT BREAST IN FEMALE, ESTROGEN RECEPTOR POSITIVE: ICD-10-CM

## 2025-04-10 DIAGNOSIS — C79.81 METASTATIC MALIGNANT NEOPLASM TO BREAST: ICD-10-CM

## 2025-04-10 DIAGNOSIS — C50.919 PRIMARY MALIGNANT NEOPLASM OF BREAST WITH METASTASIS TO OTHER SITE, UNSPECIFIED LATERALITY: ICD-10-CM

## 2025-04-10 DIAGNOSIS — C77.3 MALIGNANT NEOPLASM METASTATIC TO LYMPH NODE OF AXILLA: ICD-10-CM

## 2025-04-10 LAB
ABSOLUTE EOSINOPHIL (OHS): 0.25 K/UL
ABSOLUTE MONOCYTE (OHS): 0.62 K/UL (ref 0.3–1)
ABSOLUTE NEUTROPHIL COUNT (OHS): 5.5 K/UL (ref 1.8–7.7)
ALBUMIN SERPL BCP-MCNC: 3.5 G/DL (ref 3.5–5.2)
ALP SERPL-CCNC: 84 UNIT/L (ref 40–150)
ALT SERPL W/O P-5'-P-CCNC: 35 UNIT/L (ref 10–44)
ANION GAP (OHS): 6 MMOL/L (ref 8–16)
AST SERPL-CCNC: 31 UNIT/L (ref 11–45)
BASOPHILS # BLD AUTO: 0.07 K/UL
BASOPHILS NFR BLD AUTO: 0.8 %
BILIRUB SERPL-MCNC: 0.4 MG/DL (ref 0.1–1)
BUN SERPL-MCNC: 11 MG/DL (ref 6–20)
CALCIUM SERPL-MCNC: 9.2 MG/DL (ref 8.7–10.5)
CHLORIDE SERPL-SCNC: 105 MMOL/L (ref 95–110)
CO2 SERPL-SCNC: 26 MMOL/L (ref 23–29)
CREAT SERPL-MCNC: 1.5 MG/DL (ref 0.5–1.4)
ERYTHROCYTE [DISTWIDTH] IN BLOOD BY AUTOMATED COUNT: 13.9 % (ref 11.5–14.5)
GFR SERPLBLD CREATININE-BSD FMLA CKD-EPI: 41 ML/MIN/1.73/M2
GLUCOSE SERPL-MCNC: 136 MG/DL (ref 70–110)
HCT VFR BLD AUTO: 38.9 % (ref 37–48.5)
HGB BLD-MCNC: 12.8 GM/DL (ref 12–16)
IMM GRANULOCYTES # BLD AUTO: 0.13 K/UL (ref 0–0.04)
IMM GRANULOCYTES NFR BLD AUTO: 1.5 % (ref 0–0.5)
LYMPHOCYTES # BLD AUTO: 2.27 K/UL (ref 1–4.8)
MAGNESIUM SERPL-MCNC: 1.9 MG/DL (ref 1.6–2.6)
MCH RBC QN AUTO: 34.3 PG (ref 27–31)
MCHC RBC AUTO-ENTMCNC: 32.9 G/DL (ref 32–36)
MCV RBC AUTO: 104 FL (ref 82–98)
NUCLEATED RBC (/100WBC) (OHS): 0 /100 WBC
PHOSPHATE SERPL-MCNC: 2.9 MG/DL (ref 2.7–4.5)
PLATELET # BLD AUTO: 183 K/UL (ref 150–450)
PMV BLD AUTO: 11.7 FL (ref 9.2–12.9)
POTASSIUM SERPL-SCNC: 4.2 MMOL/L (ref 3.5–5.1)
PROT SERPL-MCNC: 7.3 GM/DL (ref 6–8.4)
RBC # BLD AUTO: 3.73 M/UL (ref 4–5.4)
RELATIVE EOSINOPHIL (OHS): 2.8 %
RELATIVE LYMPHOCYTE (OHS): 25.7 % (ref 18–48)
RELATIVE MONOCYTE (OHS): 7 % (ref 4–15)
RELATIVE NEUTROPHIL (OHS): 62.2 % (ref 38–73)
SODIUM SERPL-SCNC: 137 MMOL/L (ref 136–145)
WBC # BLD AUTO: 8.84 K/UL (ref 3.9–12.7)

## 2025-04-10 PROCEDURE — 36415 COLL VENOUS BLD VENIPUNCTURE: CPT

## 2025-04-10 PROCEDURE — 85025 COMPLETE CBC W/AUTO DIFF WBC: CPT

## 2025-04-10 PROCEDURE — 99999 PR PBB SHADOW E&M-EST. PATIENT-LVL IV: CPT | Mod: PBBFAC,,, | Performed by: INTERNAL MEDICINE

## 2025-04-10 PROCEDURE — 99214 OFFICE O/P EST MOD 30 MIN: CPT | Mod: PBBFAC | Performed by: INTERNAL MEDICINE

## 2025-04-10 PROCEDURE — 84155 ASSAY OF PROTEIN SERUM: CPT

## 2025-04-10 PROCEDURE — 84100 ASSAY OF PHOSPHORUS: CPT

## 2025-04-10 PROCEDURE — 83735 ASSAY OF MAGNESIUM: CPT

## 2025-04-10 RX ORDER — HYDROCODONE BITARTRATE AND ACETAMINOPHEN 10; 325 MG/1; MG/1
1 TABLET ORAL EVERY 6 HOURS PRN
Qty: 90 TABLET | Refills: 0 | Status: SHIPPED | OUTPATIENT
Start: 2025-04-18 | End: 2025-05-18

## 2025-04-10 RX ORDER — ANASTROZOLE 1 MG/1
1 TABLET ORAL DAILY
Qty: 30 TABLET | Refills: 2 | Status: SHIPPED | OUTPATIENT
Start: 2025-04-10 | End: 2026-04-10

## 2025-04-10 RX ORDER — ALPRAZOLAM 0.5 MG/1
0.5 TABLET ORAL 2 TIMES DAILY PRN
Qty: 60 TABLET | Refills: 0 | Status: SHIPPED | OUTPATIENT
Start: 2025-04-18 | End: 2025-05-18

## 2025-04-10 RX ORDER — CYCLOBENZAPRINE HCL 5 MG
5 TABLET ORAL 3 TIMES DAILY PRN
Qty: 60 TABLET | Refills: 1 | Status: SHIPPED | OUTPATIENT
Start: 2025-04-10 | End: 2025-05-20

## 2025-04-10 NOTE — PROGRESS NOTES
[facility]  [address] [address] [address]  Phone: [phone];  Fax: [phone]    Patient ID: Josey Flores   Reason for Visit: Follow-up  MRN:  [id number]     Oncologic Diagnosis:    - Stage IV cervical squamous cell carcinoma  - Stage IV, T2 N1b M1, invasive breast carcinoma, ER/WV+, HER2-  - Papillary thyroid carcinoma status post total thyroidectomy on 08/31/2017, mpT1b N0   - Cervical HSIL MIREILLE 3 s/p LEEP, 2017  Previous Treatment:    - Palliative chemotherapy: cisplatin, paclitaxel and bevacizumab; 02/22/2021 cycle 1 day 1 -> bevacizumab maintenance after 6 cycles  - Carboplatin, paclitaxel and pembrolizumab, C1D1 1/28/22; maintenance pembrolizumab, d/c 9/2022  Current Treatment:    - Letrozole and palbociclib   Subjective   Josey Flores is a 54 y.o. female who presents to clinic for follow up and is accompanied by her spouse.      The patient reports that she does not feel well today.  She has had recurrence of her headaches however believes this is because she needs new glasses they have improved since getting new glasses.  She has also been under stress and describes the headache as a bandlike sensation around her head.  She has been feeling more irritable, having increased diarrhea as well as recurrence of the lower abdominal/pelvic cramping.  We discussed how some of these symptoms are from her current medication regimen.  Ibrance can cause significant diarrhea and counseled her to up titrate her use of Imodium.  If this does not work I can prescribe Lomotil.  Also, aromatase inhibitors can cause severe arthralgias.  We can try an alternative agent to see if this makes any difference.  She is in agreement with these changes.  I reviewed her most recent imaging which is stable.      Review of Systems   Constitutional:  Positive for appetite change (decreased) and fatigue. Negative for activity change, chills, diaphoresis, fever and unexpected weight  change.   Respiratory:  Positive for cough. Negative for shortness of breath.    Cardiovascular:  Negative for chest pain.   Gastrointestinal:  Positive for nausea. Negative for abdominal distention, abdominal pain, anal bleeding, blood in stool, constipation, diarrhea and vomiting.   Genitourinary:  Negative for difficulty urinating and hematuria.   Musculoskeletal:  Positive for arthralgias. Negative for back pain and myalgias.   Skin:  Negative for rash.   Neurological:  Positive for headaches. Negative for dizziness, weakness and light-headedness.   Hematological:  Does not bruise/bleed easily.   Psychiatric/Behavioral:  Positive for sleep disturbance. The patient is not nervous/anxious.      History     Oncology History   Secondary cancer of bone    Chemotherapy    Treatment Summary   Plan Name: OP GYN PACLITAXEL CISPLATIN BEVACIZUMAB Q3W  Treatment Goal: Control  Status: Inactive  Start Date: 2/22/2021  End Date: 7/7/2021  Provider: Estefani Asencio MD  Chemotherapy: CISplatin (PLATINOL) 50 mg/m2 = 108 mg in sodium chloride 0.9% 608 mL chemo infusion, 50 mg/m2 = 108 mg, Intravenous, Clinic/HOD 1 time, 6 of 6 cycles  Administration: 108 mg (2/22/2021), 108 mg (3/15/2021), 108 mg (4/6/2021), 108 mg (5/11/2021), 108 mg (6/1/2021), 108 mg (6/22/2021)    bevacizumab (AVASTIN) 15 mg/kg = 1,480 mg in sodium chloride 0.9% 100 mL chemo infusion, 15 mg/kg = 1,480 mg, Intravenous, Clinic/HOD 1 time, 6 of 6 cycles  Administration: 1,480 mg (4/6/2021), 1,480 mg (2/23/2021), 1,480 mg (3/16/2021), 1,480 mg (6/1/2021), 1,480 mg (6/22/2021), 1,480 mg (5/12/2021)    PACLitaxeL (TAXOL) 175 mg/m2 = 378 mg in sodium chloride 0.9% 563 mL chemo infusion, 175 mg/m2 = 378 mg, Intravenous, Clinic/HOD 1 time, 6 of 6 cycles  Administration: 378 mg (2/22/2021), 378 mg (3/15/2021), 378 mg (4/6/2021), 378 mg (5/11/2021), 378 mg (6/1/2021), 378 mg (6/22/2021)    Plan Name: OP BEVACIZUMAB Q3W   Treatment Goal: Maintenance  Status:  Inactive  Start Date: 7/13/2021  End Date: 12/28/2021  Provider: Estefani Asencio MD  Chemotherapy: bevacizumab (AVASTIN) 1,300 mg in sodium chloride 0.9% 100 mL chemo infusion, 1,345 mg, Intravenous, Clinic/HOD 1 time, 9 of 17 cycles  Administration: 1,300 mg (7/13/2021), 1,345 mg (8/3/2021), 1,300 mg (8/24/2021), 1,300 mg (9/14/2021), 1,345 mg (10/5/2021), 1,345 mg (10/26/2021), 1,345 mg (11/16/2021), 1,300 mg (12/7/2021), 1,300 mg (12/28/2021)    Plan Name: OP CARBOPLATIN PACLITAXEL PEMBROLIZUMAB Q3W FOLLOWED BY MAINTENANCE PEMBROLIZUMAB 400 MG Q6W  Treatment Goal: Control  Status: Inactive  Start Date: 1/28/2022  End Date: 8/11/2022  Provider: Estefani Asencio MD  Chemotherapy: CARBOplatin (PARAPLATIN) 425 mg in sodium chloride 0.9% 292.5 mL chemo infusion, 425 mg, Intravenous, Clinic/HOD 1 time, 6 of 6 cycles  Dose modification:   (original dose 424 mg, Cycle 6),   (original dose 424 mg, Cycle 6)  Administration: 425 mg (1/28/2022), 495 mg (2/18/2022), 525 mg (6/2/2022), 525 mg (5/12/2022), 525 mg (3/11/2022), 495 mg (4/11/2022)    PACLitaxeL (TAXOL) 175 mg/m2 = 366 mg in sodium chloride 0.9% 561 mL chemo infusion, 175 mg/m2 = 366 mg (100 % of original dose 175 mg/m2), Intravenous, Clinic/HOD 1 time, 6 of 6 cycles  Dose modification: 175 mg/m2 (original dose 175 mg/m2, Cycle 1), 175 mg/m2 (original dose 175 mg/m2, Cycle 4)  Administration: 366 mg (1/28/2022), 366 mg (2/18/2022), 366 mg (3/11/2022), 366 mg (4/11/2022), 366 mg (5/12/2022), 366 mg (6/2/2022)       Malignant neoplasm of endocervix   9/10/2020 Imaging Significant Findings    CT A/P: Negative for ascites, omental caking, lymphadenopathy, or a gross cervical mass     12/7/2020 Biopsy    EMBX: SCC, p16+     1/12/2021 Imaging Significant Findings    MRI Pelvis w/ w/o  4.9 cm cervical mass  R parametrial invasion  R external iliac lymphadenopathy     1/27/2021 Imaging Significant Findings    PET/CT: FDG avidity in the chest     2/18/2021 Biopsy     Endoscopic biopsy of the lung: +      2/22/2021 - 6/29/2021 Chemotherapy    Cisplatin/paclitaxel/avastin x6     3/12/2021 Genetic Testing    STRATA: FRANK, PIK3c, Ep300, ERBB3, KDM6A     4/24/2021 Imaging Significant Findings    CT Pelvis w/: JUAN FRANCISCO with a normal cervix     4/26/2021 Imaging Significant Findings    PET/CT: No FDG avidity     5/18/2021 Imaging Significant Findings    CT A/P w/: JUAN FRANCISCO     6/5/2021 Imaging Significant Findings    CT A/P w/: JUAN FRANCISCO     7/12/2021 Imaging Significant Findings    PET/CT: JUAN FRANCISCO     7/13/2021 - 12/28/2021 Maintenance Therapy    Avastin x 9     10/20/2021 Imaging Significant Findings    PET/CT: JUAN FRANCISCO     1/11/2022 Imaging Significant Findings    PET/CT: Increased FDG avidity in the cervix.  No other evidence of disease.     1/28/2022 - 6/2/2022 Chemotherapy    Carboplatin/paclitaxel/pembrolizumab x 6     3/25/2022 Imaging Significant Findings    PET/CT: Persistent FDG avidity in the cervix.  New FDG avidity in the inguinal lymph nodes (although it does not look suspicious)     4/25/2022 - 4/29/2022 Radiation Therapy    Treating physician: Dr. Vázquez (MRI guided T&O)  Total Dose: 30 Gy  Fractions: 5     6/30/2022 - 8/11/2022 Maintenance Therapy    Pembrolizumab 400 mg IV x2     8/5/2022 Imaging Significant Findings    PET/CT: JUAN FRANCISCO     10/16/2022 Imaging Significant Findings    CT A/P w/ (Women's without imaging available): JUAN FRANCISCO     1/11/2023 Imaging Significant Findings    PET/CT: JUAN FRANCISCO      Chemotherapy    Treatment Summary   Plan Name: OP GYN PACLITAXEL CISPLATIN BEVACIZUMAB Q3W  Treatment Goal: Control  Status: Inactive  Start Date: 2/22/2021  End Date: 7/7/2021  Provider: Estefani Asencio MD  Chemotherapy: CISplatin (PLATINOL) 50 mg/m2 = 108 mg in sodium chloride 0.9% 608 mL chemo infusion, 50 mg/m2 = 108 mg, Intravenous, Clinic[facility] 1 time, 6 of 6 cycles  Administration: 108 mg (2/22/2021), 108 mg (3/15/2021), 108 mg (4/6/2021), 108 mg (5/11/2021), 108 mg (6/1/2021), 108 mg  (6/22/2021)    bevacizumab (AVASTIN) 15 mg/kg = 1,480 mg in sodium chloride 0.9% 100 mL chemo infusion, 15 mg/kg = 1,480 mg, Intravenous, Clinic/HOD 1 time, 6 of 6 cycles  Administration: 1,480 mg (4/6/2021), 1,480 mg (2/23/2021), 1,480 mg (3/16/2021), 1,480 mg (6/1/2021), 1,480 mg (6/22/2021), 1,480 mg (5/12/2021)    PACLitaxeL (TAXOL) 175 mg/m2 = 378 mg in sodium chloride 0.9% 563 mL chemo infusion, 175 mg/m2 = 378 mg, Intravenous, Clinic/HOD 1 time, 6 of 6 cycles  Administration: 378 mg (2/22/2021), 378 mg (3/15/2021), 378 mg (4/6/2021), 378 mg (5/11/2021), 378 mg (6/1/2021), 378 mg (6/22/2021)    Plan Name: OP BEVACIZUMAB Q3W   Treatment Goal: Maintenance  Status: Inactive  Start Date: 7/13/2021  End Date: 12/28/2021  Provider: Estefani Asencio MD  Chemotherapy: bevacizumab (AVASTIN) 1,300 mg in sodium chloride 0.9% 100 mL chemo infusion, 1,345 mg, Intravenous, Clinic/HOD 1 time, 9 of 17 cycles  Administration: 1,300 mg (7/13/2021), 1,345 mg (8/3/2021), 1,300 mg (8/24/2021), 1,300 mg (9/14/2021), 1,345 mg (10/5/2021), 1,345 mg (10/26/2021), 1,345 mg (11/16/2021), 1,300 mg (12/7/2021), 1,300 mg (12/28/2021)    Plan Name: OP CARBOPLATIN PACLITAXEL PEMBROLIZUMAB Q3W FOLLOWED BY MAINTENANCE PEMBROLIZUMAB 400 MG Q6W  Treatment Goal: Control  Status: Inactive  Start Date: 1/28/2022  End Date: 8/11/2022  Provider: Estefani Asencio MD  Chemotherapy: CARBOplatin (PARAPLATIN) 425 mg in sodium chloride 0.9% 292.5 mL chemo infusion, 425 mg, Intravenous, Clinic/HOD 1 time, 6 of 6 cycles  Dose modification:   (original dose 424 mg, Cycle 6),   (original dose 424 mg, Cycle 6)  Administration: 425 mg (1/28/2022), 495 mg (2/18/2022), 525 mg (6/2/2022), 525 mg (5/12/2022), 525 mg (3/11/2022), 495 mg (4/11/2022)    PACLitaxeL (TAXOL) 175 mg/m2 = 366 mg in sodium chloride 0.9% 561 mL chemo infusion, 175 mg/m2 = 366 mg (100 % of original dose 175 mg/m2), Intravenous, Clinic/HOD 1 time, 6 of 6 cycles  Dose modification: 175  mg/m2 (original dose 175 mg/m2, Cycle 1), 175 mg/m2 (original dose 175 mg/m2, Cycle 4)  Administration: 366 mg (1/28/2022), 366 mg (2/18/2022), 366 mg (3/11/2022), 366 mg (4/11/2022), 366 mg (5/12/2022), 366 mg (6/2/2022)             Past Medical History:   Diagnosis Date    Anxiety     Asthma     Breast cancer 2017    Cancer     right breast, metastatic-lymph nodes, bone, and thyroid     COPD (chronic obstructive pulmonary disease)     Depression     History of psychiatric hospitalization     2000; suicidal ideation    Hx of psychiatric care     Hypothyroidism     Miscarriage     five miscarriages    Obesity     Psychiatric problem     Thyroid cancer 2017       Past Surgical History:   Procedure Laterality Date    ABSCESS DRAINAGE      bilateral axilla    ADENOIDECTOMY      APPENDECTOMY      BREAST BIOPSY Right     x3    CHOLECYSTECTOMY      COLONOSCOPY N/A 8/5/2024    Procedure: COLONOSCOPY cc/bt done on 7/22/24 Dr. Eckert;  Surgeon: Doreen Beckman MD;  Location: [facility] ENDO;  Service: Endoscopy;  Laterality: N/A;    ENDOBRONCHIAL ULTRASOUND Bilateral 02/18/2021    Procedure: ENDOBRONCHIAL ULTRASOUND (EBUS);  Surgeon: Jaron Ni MD;  Location: [facility] ENDO;  Service: Pulmonary;  Laterality: Bilateral;    FLUOROSCOPY N/A 01/28/2021    Procedure: Mediport placement;  Surgeon: Noah Phelan MD;  Location: [facility] CATH LAB;  Service: General;  Laterality: N/A;    MASS EXCISION      MEDIPORT INSERTION, SINGLE Right 02/2021    SKIN GRAFT  06/16/2017    THYROIDECTOMY Bilateral     TONSILLECTOMY         Family History   Problem Relation Name Age of Onset    Arthritis Mother Beatrice     Early death Mother Beatrice         64 at time of death    Heart attack Mother Beatrice     Heart disease Mother Beatrice     Heart failure Mother Beatrice     Hypertension Mother Beatrice     Coronary artery disease Father David     Hearing loss Father David     Heart disease Father David     Hyperlipidemia Father David      Hypertension Father David     Mental illness Father David     Learning disabilities Son Keyur     Cancer Maternal Aunt         Review of patient's allergies indicates:   Allergen Reactions    Gabapentin Swelling     Note: unilateral joint edema, unclear if due to gabapentin    Nsaids (non-steroidal anti-inflammatory drug) Other (See Comments)     Instructed not to take NSAID drugs with chemo pill.    Zometa [zoledronic acid] Other (See Comments)     Possible osteonecrosis jaw    Clindamycin Rash    Vancomycin analogues Itching       Social History     Tobacco Use    Smoking status: Every Day     Current packs/day: 1.00     Average packs/day: 1 pack/day for 40.3 years (40.3 ttl pk-yrs)     Types: Cigarettes     Start date: 1/1/1985     Passive exposure: Current    Smokeless tobacco: Never    Tobacco comments:     45 pack year history   Substance Use Topics    Alcohol use: No    Drug use: No       Physical Exam     ECOG SCORE    0 - Fully active-able to carry on all pre-disease performance without restriction         Vitals:    04/10/25 1304   BP: 119/75   Pulse: 102   Temp: 97.5 °F (36.4 °C)       Physical Exam  Constitutional:       General: She is not in acute distress.     Appearance: Normal appearance.   HENT:      Head: Normocephalic and atraumatic.   Eyes:      Extraocular Movements: Extraocular movements intact.      Conjunctiva/sclera: Conjunctivae normal.   Cardiovascular:      Rate and Rhythm: Normal rate.   Pulmonary:      Effort: Pulmonary effort is normal. No respiratory distress.   Abdominal:      General: There is no distension.      Palpations: Abdomen is soft. There is no hepatomegaly or splenomegaly.      Tenderness: There is no abdominal tenderness. There is no guarding.   Skin:     Findings: No rash.   Neurological:      General: No focal deficit present.      Mental Status: She is alert.   Psychiatric:         Mood and Affect: Mood normal.         Behavior: Behavior normal.         Thought  Content: Thought content normal.       Imaging     CT CHEST ABDOMEN PELVIS WITH IV CONTRAST (XPD) - 08/14/2024     CLINICAL INDICATION: Metastatic disease evaluation     TECHNIQUE: Axial and multiplanar 2-D reformations provided.  With IV contrast     PRIORS: April 2024     FINDINGS:  1.  Emphysema  2.  Right upper lobe pulmonary nodule (series 2/40) stable  3.  Right upper lobe calcified granuloma with right hilar, mediastinal and splenic calcifications all consistent with old granulomatous disease, stable  4.  Early coronary artery calcifications  5.  Hepatomegaly with probable fatty infiltration  6.  Cholecystectomy clips without complication  7.  Inferior left renal 18 mm hypodensity consistent with simple cyst, stable  8.  Generalized atherosclerosis without aneurysm  9.  No evidence for appendicitis  10.  Diffuse thoracolumbar spondylosis without vertebral body fractures nor focal bone abnormalities        Impression:     1.  I see no evidence to suggest metastatic disease in the chest, abdomen nor pelvis  2.  Emphysema is present; it is an independent risk factor for lung cancer. Recommend patient be evaluated for low dose cancer screening protocol.  3.  Stable right upper lobe pulmonary nodule as described above  4.  Coronary artery calcifications  5.  Fatty liver  6.  Left renal simple cyst (no further follow-up nor evaluation recommended for this isolated abnormality)     All CT scans at [this location] are performed using dose modulation techniques as appropriate to a performed exam including the following:  Automated exposure control; adjustment of the mA and/or kV according to patient size (this includes techniques or standardized protocols for targeted exams where dose is matched to indication / reason for exam; i.e. extremities or head); use of iterative reconstruction technique.    Physical Exam   ECOG:   ECOG SCORE    0 - Fully active-able to carry on all pre-disease performance without  "restriction          Vitals:  /75   Pulse 102   Temp 97.5 °F (36.4 °C) (Temporal)   Ht 5' 7" (1.702 m)   Wt 108.7 kg (239 lb 12 oz)   LMP 12/05/2020 Comment: no cycle x3 years  SpO2 98%   BMI 37.55 kg/m²     Physical Exam:  Physical Exam  Constitutional:       General: She is not in acute distress.     Appearance: Normal appearance.   HENT:      Head: Normocephalic and atraumatic.   Eyes:      Extraocular Movements: Extraocular movements intact.      Conjunctiva/sclera: Conjunctivae normal.   Cardiovascular:      Rate and Rhythm: Normal rate.   Pulmonary:      Effort: Pulmonary effort is normal. No respiratory distress.   Abdominal:      General: There is no distension.      Palpations: Abdomen is soft. There is no hepatomegaly or splenomegaly.      Tenderness: There is no abdominal tenderness. There is no guarding.   Skin:     Findings: No rash.   Neurological:      General: No focal deficit present.      Mental Status: She is alert.   Psychiatric:         Mood and Affect: Mood normal.         Behavior: Behavior normal.         Thought Content: Thought content normal.          Labs   Labs:  Lab Visit on 04/10/2025   Component Date Value Ref Range Status    Sodium 04/10/2025 137  136 - 145 mmol/L Final    Potassium 04/10/2025 4.2  3.5 - 5.1 mmol/L Final    Chloride 04/10/2025 105  95 - 110 mmol/L Final    CO2 04/10/2025 26  23 - 29 mmol/L Final    Glucose 04/10/2025 136 (H)  70 - 110 mg/dL Final    BUN 04/10/2025 11  6 - 20 mg/dL Final    Creatinine 04/10/2025 1.5 (H)  0.5 - 1.4 mg/dL Final    Calcium 04/10/2025 9.2  8.7 - 10.5 mg/dL Final    Protein Total 04/10/2025 7.3  6.0 - 8.4 gm/dL Final    Albumin 04/10/2025 3.5  3.5 - 5.2 g/dL Final    Bilirubin Total 04/10/2025 0.4  0.1 - 1.0 mg/dL Final    For infants and newborns, interpretation of results should be based   on gestational age, weight and in agreement with clinical   observations.    Premature Infant recommended reference ranges:   0-24 hours: "  <8.0 mg/dL   24-48 hours: <12.0 mg/dL   3-5 days:    <15.0 mg/dL   6-29 days:   <15.0 mg/dL    ALP 04/10/2025 84  40 - 150 unit/L Final    AST 04/10/2025 31  11 - 45 unit/L Final    ALT 04/10/2025 35  10 - 44 unit/L Final    Anion Gap 04/10/2025 6 (L)  8 - 16 mmol/L Final    eGFR 04/10/2025 41 (L)  >60 mL/min/1.73/m2 Final    Estimated GFR calculated using the CKD-EPI creatinine (2021) equation.    Magnesium  04/10/2025 1.9  1.6 - 2.6 mg/dL Final    Phosphorus Level 04/10/2025 2.9  2.7 - 4.5 mg/dL Final    WBC 04/10/2025 8.84  3.90 - 12.70 K/uL Final    RBC 04/10/2025 3.73 (L)  4.00 - 5.40 M/uL Final    HGB 04/10/2025 12.8  12.0 - 16.0 gm/dL Final    HCT 04/10/2025 38.9  37.0 - 48.5 % Final    MCV 04/10/2025 104 (H)  82 - 98 fL Final    MCH 04/10/2025 34.3 (H)  27.0 - 31.0 pg Final    MCHC 04/10/2025 32.9  32.0 - 36.0 g/dL Final    RDW 04/10/2025 13.9  11.5 - 14.5 % Final    Platelet Count 04/10/2025 183  150 - 450 K/uL Final    MPV 04/10/2025 11.7  9.2 - 12.9 fL Final    Nucleated RBC 04/10/2025 0  <=0 /100 WBC Final    Neut % 04/10/2025 62.2  38 - 73 % Final    Lymph % 04/10/2025 25.7  18 - 48 % Final    Mono % 04/10/2025 7.0  4 - 15 % Final    Eos % 04/10/2025 2.8  <=8 % Final    Basophil % 04/10/2025 0.8  <=1.9 % Final    Imm Grans % 04/10/2025 1.5 (H)  0.0 - 0.5 % Final    Neut # 04/10/2025 5.50  1.8 - 7.7 K/uL Final    Lymph # 04/10/2025 2.27  1 - 4.8 K/uL Final    Mono # 04/10/2025 0.62  0.3 - 1 K/uL Final    Eos # 04/10/2025 0.25  <=0.5 K/uL Final    Baso # 04/10/2025 0.07  <=0.2 K/uL Final    Imm Grans # 04/10/2025 0.13 (H)  0.00 - 0.04 K/uL Final    Mild elevation in immature granulocytes is non specific and can be seen in a variety of conditions including stress response, acute inflammation, trauma and pregnancy. Correlation with other laboratory and clinical findings is essential.        Imaging   CT Chest Abdomen Without Contrast (XPD)  - 03/05/2025  FINDINGS: No acute or suspicious osseous abnormality  is seen.     CHEST: Normal heart size.  Mild coronary artery calcifications.  No aortic aneurysm.  Stable calcified granulomas in the right and calcified right hilar and mediastinal lymph nodes.  Stable 6 mm subpleural opacity lateral right upper lobe image 41 series 2 which has a benign linear morphology on coronal image 101.  No suspicious pulmonary nodules.  Stable emphysema.  No pleural effusion.  No pathologic adenopathy.     Abdomen/pelvis: No ascites, free air or lymphadenopathy is identified.  Cholecystectomy.  Hepatic steatosis.  The liver, bile ducts, pancreas, spleen and adrenals are otherwise unremarkable.  Stable small renal cysts.  The kidneys, ureters and bladder are otherwise unremarkable.  The small bowel, colon and appendix are unremarkable.  No evidence of bowel obstruction or acute inflammatory process.  No aortic aneurysm.     Impression:  1.  No evidence of metastatic disease in the chest, abdomen or pelvis.         CT Chest Abdomen Without Contrast (XPD)  - 01/13/2024  Impression:  Stable exam.  7 mm right nodule, unchanged.  Calcified granuloma with calcified right hilar lymph nodes.  Fatty infiltration of the liver.  Status post cholecystectomy.         CT CHEST ABDOMEN PELVIS WITHOUT CONTRAST(XPD) - 04/15/24  COMPARISON:  01/13/2024     FINDINGS:  CT of chest without contrast:     The pulmonary window images demonstrate stable appearance of a calcified granuloma located laterally in the right upper lobe and a linear density more characteristic of localized pulmonary scarring also seen laterally in the right upper lobe (axial image 179).  Centrilobular emphysema is also visible in the upper lobes bilaterally, more severe on the right.  No pleural effusion or lung consolidation is appreciated.  No pathologic lymph node enlargement is visible in the mediastinum or axilla bilaterally.  Dense lymph node calcification associated with granulomatous disease is again noted in the mediastinum and  right hilum.  Heart size is normal.  No chest wall abnormalities are appreciated except for evidence of apparent prior breast biopsy on the right.  A MediPort is also seen entering the right internal jugular vein.     CT of abdomen and pelvis without contrast:     The liver is enlarged measuring 22.7 cm in its craniocaudal dimension.  Decreased attenuation is also visible in the liver parenchyma consistent with generalized hepatic steatosis.  The spleen appears normal except for multiple incidental punctate granulomatous calcifications.  The pancreas and adrenals appear normal.  The gallbladder is surgically absent.  No parenchymal abnormality, hydronephrosis or intrarenal calculi are visible in either kidney.  No free intraperitoneal fluid or lymph node enlargement is identified.     Images obtained through the pelvis demonstrate no gross abnormality in an incompletely distended urinary bladder.  The uterus and ovaries are surgically absent.  No free pelvic fluid or abnormal soft tissue masses are identified.     Impression:  Emphysematous changes, most severe in the right upper lobe.  Stable faint noncalcified nodular focal pulmonary scarring in the right upper lobe compared to 01/13/2024.  Hepatomegaly and generalized hepatic steatosis.  Status post cholecystectomy and hysterectomy.    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   Dec 2020 patient had vaginal bleeding and MRI pelvis showed LAD  TB discussion 01/29/21: consensus for systemic chemotherapy given advanced cervical squamous cell carcinoma with cisplatin, paclitaxel and bevacizumab with discontinuation of palbociclib but can continue aromatase inhibitor; taxane may also be active for her concomitant metastatic breast cancer.   Renetta genetic testing negative  Treated with 6C (initiated 02/21/21) of Cis/Paclitaxel/Karina with improvement of disease and started on maintenance Karina   PET-CT January 2022 notable for uptake in cervical region concerning for oligo progression/recurrence of her disease.  Previously biopsy proven metastatic cervical cancer in lungs without evidence of recurrent mass/lymphadenopathy/avidity in this region or otherwise.  Treated with chemo-immunotherapy (C1 Pembrolizumab and Carbo/Paclitaxel)  and vaginal brachy therapy(done at [facility])  Restaging scans after this showed no evidence of recurrence and decision to hold therapy made; she was resumed on Ibrance for metastatic breast cancer  Continue with surveillance imaging       Chronic Medical Conditions  Hx of CVA June 2023  COPD  History of papillary thyroid carcinoma status post total thyroidectomy on 08/31/2017:   s/p total thyroidectomy on levothyroxine.   Hypothyroidism:   Previously on levothyroxine 275 mcg daily.    TFTs normalized   Increased Synthroid to 300 mcg daily and TFTs improved.  Continue Synthroid 300 mcg daily  Continue repeat thyroid function labs for monitoring.  Refer to endocrinology   Macrocytosis:   Possibly related to thyroid disorder will continue to monitor.  She is on vitamin-B supplement.  No new anemia.  Neuropathy:     Mild, will monitor   Tobacco abuse:    Precontemplative; continued cessation encouraged; will obtain LDCT of chest for screening purposes; discussed with patient and she is in agreement    Abdominal Cramping          Med Onc  Chart Routing      Follow up with physician 3 months.   Follow up with MIGUEL 4 weeks and 2 months. [place]   Infusion scheduling note    Injection scheduling note    Labs CMP, CBC, phosphorus and magnesium   Scheduling:  Preferred lab:  Lab interval:     Imaging    Pharmacy appointment    Other referrals                  The patient was seen, interviewed and examined. Pertinent lab and radiologic studies were reviewed. Pt instructed to call should they develop concerning signs/symptoms or have further questions.        Portions of the record may have been created with voice recognition software. Occasional wrong-word or sound-a-like substitutions may have occurred due to the inherent limitations of voice recognition software. Read the chart carefully and recognize, using context, where substitutions have occurred.      Donita Nuñez MD    Hematology/Oncology

## 2025-04-14 DIAGNOSIS — C79.51 SECONDARY CANCER OF BONE: Primary | ICD-10-CM

## 2025-04-14 DIAGNOSIS — C79.81 METASTATIC MALIGNANT NEOPLASM TO BREAST: ICD-10-CM

## 2025-04-23 ENCOUNTER — OFFICE VISIT (OUTPATIENT)
Dept: PALLIATIVE MEDICINE | Facility: CLINIC | Age: 55
End: 2025-04-23
Payer: MEDICAID

## 2025-04-23 ENCOUNTER — LAB VISIT (OUTPATIENT)
Dept: LAB | Facility: HOSPITAL | Age: 55
End: 2025-04-23
Attending: FAMILY MEDICINE
Payer: MEDICAID

## 2025-04-23 VITALS
HEART RATE: 97 BPM | OXYGEN SATURATION: 96 % | TEMPERATURE: 98 F | HEIGHT: 67 IN | WEIGHT: 236.56 LBS | BODY MASS INDEX: 37.13 KG/M2

## 2025-04-23 DIAGNOSIS — C50.811 MALIGNANT NEOPLASM OF OVERLAPPING SITES OF RIGHT BREAST IN FEMALE, ESTROGEN RECEPTOR POSITIVE: ICD-10-CM

## 2025-04-23 DIAGNOSIS — E53.8 B12 DEFICIENCY: Primary | ICD-10-CM

## 2025-04-23 DIAGNOSIS — C79.51 SECONDARY CANCER OF BONE: ICD-10-CM

## 2025-04-23 DIAGNOSIS — E53.8 B12 DEFICIENCY: ICD-10-CM

## 2025-04-23 DIAGNOSIS — Z51.5 PALLIATIVE CARE ENCOUNTER: ICD-10-CM

## 2025-04-23 DIAGNOSIS — Z17.0 MALIGNANT NEOPLASM OF OVERLAPPING SITES OF RIGHT BREAST IN FEMALE, ESTROGEN RECEPTOR POSITIVE: ICD-10-CM

## 2025-04-23 DIAGNOSIS — M79.10 MYALGIA: ICD-10-CM

## 2025-04-23 DIAGNOSIS — M54.9 BACK PAIN, UNSPECIFIED BACK LOCATION, UNSPECIFIED BACK PAIN LATERALITY, UNSPECIFIED CHRONICITY: ICD-10-CM

## 2025-04-23 DIAGNOSIS — G89.3 NEOPLASM RELATED PAIN: ICD-10-CM

## 2025-04-23 DIAGNOSIS — C79.81 METASTATIC MALIGNANT NEOPLASM TO BREAST: ICD-10-CM

## 2025-04-23 PROCEDURE — 99999 PR PBB SHADOW E&M-EST. PATIENT-LVL V: CPT | Mod: PBBFAC,,, | Performed by: NURSE PRACTITIONER

## 2025-04-23 PROCEDURE — 83090 ASSAY OF HOMOCYSTEINE: CPT

## 2025-04-23 PROCEDURE — 82746 ASSAY OF FOLIC ACID SERUM: CPT

## 2025-04-23 PROCEDURE — 36415 COLL VENOUS BLD VENIPUNCTURE: CPT

## 2025-04-23 PROCEDURE — 99215 OFFICE O/P EST HI 40 MIN: CPT | Mod: PBBFAC | Performed by: NURSE PRACTITIONER

## 2025-04-23 PROCEDURE — 82607 VITAMIN B-12: CPT

## 2025-04-23 PROCEDURE — 83921 ORGANIC ACID SINGLE QUANT: CPT

## 2025-04-23 RX ORDER — HYDROCODONE BITARTRATE AND ACETAMINOPHEN 10; 325 MG/1; MG/1
1 TABLET ORAL EVERY 8 HOURS PRN
Qty: 84 TABLET | Refills: 0 | Status: SHIPPED | OUTPATIENT
Start: 2025-04-23 | End: 2025-05-21

## 2025-04-23 NOTE — ASSESSMENT & PLAN NOTE
Impression:  Symptoms:  pain to torso, memory fog  Medicolegal: Ms Flores does have decision making capacity.    Sister Niranjan Acevedo is HCPOA.  Psychosocial:  support system consists of significant other  Understanding of disease and Illness Trajectory: Patient and Significant Other  has  adequate understanding of her illness, they can benefit from continued education on what to expect in the future.  Goals of care:  Improve pain management and QOL. Control cancer as long as possible.     Summary and recommendations:Continue POC. Will x-ray T spine.

## 2025-04-23 NOTE — ASSESSMENT & PLAN NOTE
Multiple calcified LN to chest noted on chest CT March, 2025.  Now pain radiates from spine around torso   X-ray thoracic spine.  Continue Norco. Consider resuming pregabalin.

## 2025-04-23 NOTE — PROGRESS NOTES
Palliative Medicine Clinic Note       Chief Complaint:   Chief Complaint   Patient presents with    Pain And Symptom Management   Follow up cancer-related pain.     ASSESSMENT/PLAN:      Plan/Recommendations:  1. B12 deficiency  -     Vitamin B12; Future; Expected date: 04/23/2025  -     Methylmalonic Acid, Serum; Future; Expected date: 04/23/2025  -     Folate; Future; Expected date: 04/23/2025  -     Homocysteine, Serum; Future; Expected date: 04/23/2025    2. Metastatic malignant neoplasm to breast  -     HYDROcodone-acetaminophen (NORCO)  mg per tablet; Take 1 tablet by mouth every 8 (eight) hours as needed for Pain (3 doses/day).  Dispense: 84 tablet; Refill: 0    3. Neoplasm related pain  Overview:        Assessment & Plan:  Multiple calcified LN to chest noted on chest CT March, 2025.  Now pain radiates from spine around torso   X-ray thoracic spine.  Continue Norco. Consider resuming pregabalin.       Orders:  -     HYDROcodone-acetaminophen (NORCO)  mg per tablet; Take 1 tablet by mouth every 8 (eight) hours as needed for Pain (3 doses/day).  Dispense: 84 tablet; Refill: 0    4. Malignant neoplasm of overlapping sites of right breast in female, estrogen receptor positive  Assessment & Plan:  Followed closely by med onc    Orders:  -     HYDROcodone-acetaminophen (NORCO)  mg per tablet; Take 1 tablet by mouth every 8 (eight) hours as needed for Pain (3 doses/day).  Dispense: 84 tablet; Refill: 0    5. Secondary cancer of bone  -     HYDROcodone-acetaminophen (NORCO)  mg per tablet; Take 1 tablet by mouth every 8 (eight) hours as needed for Pain (3 doses/day).  Dispense: 84 tablet; Refill: 0    6. Back pain, unspecified back location, unspecified back pain laterality, unspecified chronicity  -     X-Ray Thoracic Spine AP Lateral; Future; Expected date: 04/23/2025    7. Myalgia    8. Palliative care encounter  Assessment & Plan:  Impression:  Symptoms:  pain to torso, memory  fog  Medicolegal: Ms Flores does have decision making capacity.    Sister Niranjan Acevedo is HCPOA.  Psychosocial:  support system consists of significant other  Understanding of disease and Illness Trajectory: Patient and Significant Other  has  adequate understanding of her illness, they can benefit from continued education on what to expect in the future.  Goals of care:  Improve pain management and QOL. Control cancer as long as possible.     Summary and recommendations:Continue POC. Will x-ray T spine.                    Advance Care Planning   Advance Care Planning         Thank you for involving me in the care of this patient.     No follow-ups on file.    Future Appointments   Date Time Provider Department Center   5/8/2025 12:20 PM ANTHONYON ROUBRIAN [facility]   5/8/2025  1:00 PM Kiley Prieto NP [facility]   6/19/2025  2:50 PM BATON ROUBRIAN [facility]   6/19/2025  3:30 PM Kiley Prieto NP [facility]   7/17/2025 12:50 PM BATON ROUBRIAN [facility]   7/17/2025  1:30 PM Donita Ruff MD [facility] HEM [facility]        SUBJECTIVE:      History of Present Illness / Interval History:  Josey Flores is a 52 y.o. female with history of stage IV ER/HI+, HER2- breast cancer, thyroid cancer post thyroidectomy (2017), and cervical cancer (2017). Bone mets to left ilium and manubrium noted 2021. Medical history includes stroke with left-sided weakness June, 2023, CKD3, anxiety, COPD with asthma, hypothyroidism, OA, tobacco use, Right foot fracture 05/16/2024- Post fixation June 13, 2024- Dr. Prince ([facility]).     Presents to Palliative Care Clinic for pain and anxiety.   Please see previous palliative care and oncology notes for more details on pt's care so far.        4/23/25  History obtained from: patient, family, and medical record.    Ms Flores is an established palliative medicine patient but new to me.   Previously weaning Norco but this was halted due  to increased pain to ribs.    Continues with pain to lower ribs, wraps around bilateral chest wall. Onset 6-8 months ago.   Describes as intermittent, severe cramping pain.   Prescribed muscle relaxer by Dr Ruff. She does not know if this is effective.   Some relief with muscle relaxer. This helps her sleep with Xanax.   Taking 2-3 Norco each day. Pain ranges 6-8/10 on these days.   Pain is worse with movement.   No longer having pelvic pain.     Depressed at times because she cannot see her grandchildren. [place], difficulties with their mother.      Reports h/o B12 deficiency, previously took B12 injections. Felt better overall when she did. Recent memory is poor. Previously saw pain mgmt in [facility]. States no interventions.       Today we discussed role of palliative care, symptom management, and goals of care.        ROS:  Review of Systems    Palliative Exam    Medications:  Current Medications[1]    External  database queried on 04/23/2025  by Nasra REID :         Review of patient's allergies indicates:   Allergen Reactions    Gabapentin Swelling     Note: unilateral joint edema, unclear if due to gabapentin    Nsaids (non-steroidal anti-inflammatory drug) Other (See Comments)     Instructed not to take NSAID drugs with chemo pill.    Zometa [zoledronic acid] Other (See Comments)     Possible osteonecrosis jaw    Clindamycin Rash    Vancomycin analogues Itching        OBJECTIVE:   Physical Exam:  Vitals: Temp: 97.6 °F (36.4 °C) (04/23/25 1132)  Pulse: 97 (04/23/25 1132)  SpO2: 96 % (04/23/25 1132)    Physical Exam  Constitutional:       General: She is not in acute distress.     Appearance: She is not ill-appearing.   Eyes:      General: No scleral icterus.  Cardiovascular:      Rate and Rhythm: Normal rate and regular rhythm.   Pulmonary:      Effort: Pulmonary effort is normal.      Breath sounds: Normal breath sounds.   Abdominal:      General: There is no distension.       Palpations: Abdomen is soft.      Tenderness: There is no abdominal tenderness. There is no right CVA tenderness or left CVA tenderness.   Musculoskeletal:         General: Tenderness (pain reproduced at T8. no deformity) present. No deformity.   Skin:     General: Skin is warm and dry.      Coloration: Skin is not jaundiced.      Findings: No bruising.   Neurological:      Mental Status: She is alert.   Psychiatric:         Mood and Affect: Mood normal.         Behavior: Behavior normal.         Thought Content: Thought content normal.         Judgment: Judgment normal.         Wt Readings from Last 3 Encounters:   04/23/25 1132 107.3 kg (236 lb 8.9 oz)   04/10/25 1304 108.7 kg (239 lb 12 oz)   03/12/25 1340 109.1 kg (240 lb 10.1 oz)     BP Readings from Last 3 Encounters:   04/10/25 119/75   03/12/25 97/64   03/12/25 116/81       Labs:  Lab Results   Component Value Date    WBC 8.84 04/10/2025    HGB 12.8 04/10/2025    HCT 38.9 04/10/2025     (H) 04/10/2025     04/10/2025           Imaging:  3/5/25 CT CAP  EXAM:  CT CHEST ABDOMEN PELVIS WITH IV CONTRAST (XPD)     Comparison: 08/14/2024     FINDINGS: No acute or suspicious osseous abnormality is seen.     CHEST: Normal heart size.  Mild coronary artery calcifications.  No aortic aneurysm.  Stable calcified granulomas in the right and calcified right hilar and mediastinal lymph nodes.  Stable 6 mm subpleural opacity lateral right upper lobe image 41 series 2 which has a benign linear morphology on coronal image 101.  No suspicious pulmonary nodules.  Stable emphysema.  No pleural effusion.  No pathologic adenopathy.     Abdomen/pelvis: No ascites, free air or lymphadenopathy is identified.  Cholecystectomy.  Hepatic steatosis.  The liver, bile ducts, pancreas, spleen and adrenals are otherwise unremarkable.  Stable small renal cysts.  The kidneys, ureters and bladder are otherwise unremarkable.  The small bowel, colon and appendix are  unremarkable.  No evidence of bowel obstruction or acute inflammatory process.  No aortic aneurysm.        Impression:     1.  No evidence of metastatic disease in the chest, abdomen or pelvis.     Finalized on: 3/5/2025 11:36 AM By:  Carter Clifton MD       I spent a total of 60 minutes on the day of the visit. This includes face to face time in discussion of goals of care, symptom assessment, coordination of care and emotional support.  This also includes non-face to face time preparing to see the patient (eg, review of tests/imaging), obtaining and/or reviewing separately obtained history, documenting clinical information in the electronic or other health record, independently interpreting results and communicating results to the patient/family/caregiver, or care coordinator.     Additional 10 min time spent on a voluntary advance care planning and /or goals of care discussion, providing emotional support, formulating and communicating prognosis and exploring burden/benefit of various approaches of treatment.       Nasra Cherry NP         [1]   Current Outpatient Medications:     albuterol (PROVENTIL/VENTOLIN HFA) 90 mcg/actuation inhaler, Inhale 2 puffs into the lungs every 4 (four) hours as needed for Wheezing or Shortness of Breath., Disp: 18 g, Rfl: 11    ALPRAZolam (XANAX) 0.5 MG tablet, Take 1 tablet (0.5 mg total) by mouth 2 (two) times daily as needed for Anxiety., Disp: 60 tablet, Rfl: 0    anastrozole (ARIMIDEX) 1 mg Tab, Take 1 tablet (1 mg total) by mouth once daily., Disp: 30 tablet, Rfl: 2    calcium carbonate 400 mg calcium (1,000 mg) Chew, Take 2,000 mg by mouth once daily., Disp: , Rfl:     clopidogreL (PLAVIX) 75 mg tablet, Take 1 tablet (75 mg total) by mouth once daily., Disp: 30 tablet, Rfl: 11    cyclobenzaprine (FLEXERIL) 5 MG tablet, Take 1 tablet (5 mg total) by mouth 3 (three) times daily as needed for Muscle spasms., Disp: 60 tablet, Rfl: 1    dicyclomine (BENTYL) 20 mg tablet, Take 1  tablet (20 mg total) by mouth 3 (three) times daily as needed (abdominal pain)., Disp: 90 tablet, Rfl: 11    DULoxetine (CYMBALTA) 60 MG capsule, Take 1 capsule by mouth once daily, Disp: 30 capsule, Rfl: 0    ergocalciferol (ERGOCALCIFEROL) 50,000 unit Cap, Take 1 capsule by mouth once a week, Disp: 12 capsule, Rfl: 0    ergocalciferol (VITAMIN D2) 50,000 unit Cap, Take 5,000 Units by mouth once daily at 6am. , Disp: , Rfl:     folic acid (FOLVITE) 1 MG tablet, Take 1 tablet (1 mg total) by mouth once daily., Disp: 100 tablet, Rfl: 3    HYDROcodone-acetaminophen (NORCO)  mg per tablet, Take 1 tablet by mouth every 8 (eight) hours as needed for Pain (3 doses/day)., Disp: 84 tablet, Rfl: 0    hydrOXYzine HCL (ATARAX) 25 MG tablet, Take 1 tablet (25 mg total) by mouth 3 (three) times daily as needed for Itching., Disp: 90 tablet, Rfl: 1    ipratropium (ATROVENT) 42 mcg (0.06 %) nasal spray, 2 sprays by Nasal route 4 (four) times daily. As needed for rhinitis. Take in evening before bed and in the morning, Disp: 15 mL, Rfl: 11    levothyroxine (SYNTHROID) 300 MCG Tab, Take 1 tablet (300 mcg total) by mouth before breakfast., Disp: 30 tablet, Rfl: 11    multivitamin (THERAGRAN) per tablet, Take 1 tablet by mouth once daily., Disp: , Rfl:     naloxone (NARCAN) 4 mg/actuation Spry, SMARTSIG:Both Nares, Disp: , Rfl:     palbociclib (IBRANCE) 125 mg Cap, Take one capsule (125 mg) by mouth once daily on days 1-21 of each 28-day cycle., Disp: 21 capsule, Rfl: 11    pentoxifylline (TRENTAL) 400 mg TbSR, Take 1 tablet (400 mg total) by mouth 2 (two) times daily with meals., Disp: 60 tablet, Rfl: 11    potassium chloride SA (K-DUR,KLOR-CON) 20 MEQ tablet, Take 1 tablet (20 mEq total) by mouth once daily. Take daily for 3 days., Disp: 3 tablet, Rfl: 1    vitamin E 1000 UNIT capsule, Take 1 capsule (1,000 Units total) by mouth once daily., Disp: 30 capsule, Rfl: 11

## 2025-04-24 LAB
(HCYS)2 SERPL-MCNC: 27.7 UMOL/L (ref 4–15.5)
FOLATE SERPL-MCNC: 4.4 NG/ML (ref 4–24)
VIT B12 SERPL-MCNC: 231 PG/ML (ref 210–950)

## 2025-04-28 ENCOUNTER — APPOINTMENT (OUTPATIENT)
Dept: RADIOLOGY | Facility: HOSPITAL | Age: 55
End: 2025-04-28
Attending: NURSE PRACTITIONER
Payer: MEDICAID

## 2025-04-28 DIAGNOSIS — M54.9 BACK PAIN, UNSPECIFIED BACK LOCATION, UNSPECIFIED BACK PAIN LATERALITY, UNSPECIFIED CHRONICITY: ICD-10-CM

## 2025-04-28 PROCEDURE — 72070 X-RAY EXAM THORAC SPINE 2VWS: CPT | Mod: TC,PN

## 2025-04-28 PROCEDURE — 72070 X-RAY EXAM THORAC SPINE 2VWS: CPT | Mod: 26,,, | Performed by: RADIOLOGY

## 2025-04-29 ENCOUNTER — RESULTS FOLLOW-UP (OUTPATIENT)
Dept: PALLIATIVE MEDICINE | Facility: CLINIC | Age: 55
End: 2025-04-29

## 2025-04-29 LAB — W METHYLMALONIC ACID: 0.4 UMOL/L

## 2025-05-19 ENCOUNTER — OFFICE VISIT (OUTPATIENT)
Dept: PALLIATIVE MEDICINE | Facility: CLINIC | Age: 55
End: 2025-05-19
Payer: MEDICAID

## 2025-05-19 ENCOUNTER — LAB VISIT (OUTPATIENT)
Dept: LAB | Facility: HOSPITAL | Age: 55
End: 2025-05-19
Attending: NURSE PRACTITIONER
Payer: MEDICAID

## 2025-05-19 VITALS
TEMPERATURE: 98 F | WEIGHT: 236.75 LBS | HEIGHT: 67 IN | DIASTOLIC BLOOD PRESSURE: 71 MMHG | SYSTOLIC BLOOD PRESSURE: 104 MMHG | OXYGEN SATURATION: 96 % | HEART RATE: 96 BPM | BODY MASS INDEX: 37.16 KG/M2

## 2025-05-19 DIAGNOSIS — Z79.891 LONG TERM (CURRENT) USE OF OPIATE ANALGESIC: ICD-10-CM

## 2025-05-19 DIAGNOSIS — C79.81 METASTATIC MALIGNANT NEOPLASM TO BREAST: ICD-10-CM

## 2025-05-19 DIAGNOSIS — C79.51 SECONDARY CANCER OF BONE: ICD-10-CM

## 2025-05-19 DIAGNOSIS — Z17.0 MALIGNANT NEOPLASM OF OVERLAPPING SITES OF RIGHT BREAST IN FEMALE, ESTROGEN RECEPTOR POSITIVE: ICD-10-CM

## 2025-05-19 DIAGNOSIS — G89.3 NEOPLASM RELATED PAIN: ICD-10-CM

## 2025-05-19 DIAGNOSIS — C77.3 MALIGNANT NEOPLASM METASTATIC TO LYMPH NODE OF AXILLA: ICD-10-CM

## 2025-05-19 DIAGNOSIS — R10.30 LOWER ABDOMINAL PAIN: ICD-10-CM

## 2025-05-19 DIAGNOSIS — F41.9 ANXIETY AND DEPRESSION: ICD-10-CM

## 2025-05-19 DIAGNOSIS — D53.9 MACROCYTIC ANEMIA: ICD-10-CM

## 2025-05-19 DIAGNOSIS — C50.811 MALIGNANT NEOPLASM OF OVERLAPPING SITES OF RIGHT BREAST IN FEMALE, ESTROGEN RECEPTOR POSITIVE: ICD-10-CM

## 2025-05-19 DIAGNOSIS — G47.00 INSOMNIA, UNSPECIFIED TYPE: ICD-10-CM

## 2025-05-19 DIAGNOSIS — F32.A ANXIETY AND DEPRESSION: ICD-10-CM

## 2025-05-19 DIAGNOSIS — E53.8 B12 DEFICIENCY: Primary | ICD-10-CM

## 2025-05-19 PROBLEM — R52 INTRACTABLE PAIN: Status: RESOLVED | Noted: 2024-04-25 | Resolved: 2025-05-19

## 2025-05-19 PROCEDURE — 3008F BODY MASS INDEX DOCD: CPT | Mod: CPTII,,, | Performed by: NURSE PRACTITIONER

## 2025-05-19 PROCEDURE — 80307 DRUG TEST PRSMV CHEM ANLYZR: CPT

## 2025-05-19 PROCEDURE — 3074F SYST BP LT 130 MM HG: CPT | Mod: CPTII,,, | Performed by: NURSE PRACTITIONER

## 2025-05-19 PROCEDURE — 99999 PR PBB SHADOW E&M-EST. PATIENT-LVL V: CPT | Mod: PBBFAC,,, | Performed by: NURSE PRACTITIONER

## 2025-05-19 PROCEDURE — 99215 OFFICE O/P EST HI 40 MIN: CPT | Mod: PBBFAC | Performed by: NURSE PRACTITIONER

## 2025-05-19 PROCEDURE — 1159F MED LIST DOCD IN RCRD: CPT | Mod: CPTII,,, | Performed by: NURSE PRACTITIONER

## 2025-05-19 PROCEDURE — 3078F DIAST BP <80 MM HG: CPT | Mod: CPTII,,, | Performed by: NURSE PRACTITIONER

## 2025-05-19 PROCEDURE — 99215 OFFICE O/P EST HI 40 MIN: CPT | Mod: S$PBB,,, | Performed by: NURSE PRACTITIONER

## 2025-05-19 PROCEDURE — 1160F RVW MEDS BY RX/DR IN RCRD: CPT | Mod: CPTII,,, | Performed by: NURSE PRACTITIONER

## 2025-05-19 RX ORDER — DULOXETIN HYDROCHLORIDE 30 MG/1
CAPSULE, DELAYED RELEASE ORAL
Qty: 42 CAPSULE | Refills: 0 | Status: SHIPPED | OUTPATIENT
Start: 2025-05-19 | End: 2025-06-16

## 2025-05-19 RX ORDER — ALPRAZOLAM 0.5 MG/1
0.5 TABLET ORAL 2 TIMES DAILY PRN
Qty: 56 TABLET | Refills: 0 | Status: SHIPPED | OUTPATIENT
Start: 2025-05-19 | End: 2025-06-16

## 2025-05-19 RX ORDER — HYDROCODONE BITARTRATE AND ACETAMINOPHEN 10; 325 MG/1; MG/1
1 TABLET ORAL EVERY 6 HOURS PRN
Qty: 112 TABLET | Refills: 0 | Status: SHIPPED | OUTPATIENT
Start: 2025-05-19 | End: 2025-06-16

## 2025-05-19 RX ORDER — FOLIC ACID 1 MG/1
1 TABLET ORAL DAILY
Qty: 100 TABLET | Refills: 3 | Status: SHIPPED | OUTPATIENT
Start: 2025-05-19 | End: 2026-05-19

## 2025-05-19 NOTE — PROGRESS NOTES
Palliative Medicine Clinic Note    Chief Complaint: No chief complaint on file.  Not felling well today. 4 week f/u     ASSESSMENT/PLAN:      Plan/Recommendations:  1. B12 deficiency    2. Malignant neoplasm metastatic to lymph node of axilla  -     folic acid (FOLVITE) 1 MG tablet; Take 1 tablet (1 mg total) by mouth once daily.  Dispense: 100 tablet; Refill: 3    3. Macrocytic anemia  -     folic acid (FOLVITE) 1 MG tablet; Take 1 tablet (1 mg total) by mouth once daily.  Dispense: 100 tablet; Refill: 3    4. Long term (current) use of opiate analgesic  -     Pain Clinic Drug Screen; Future; Expected date: 05/19/2025    5. Metastatic malignant neoplasm to breast  -     HYDROcodone-acetaminophen (NORCO)  mg per tablet; Take 1 tablet by mouth every 6 (six) hours as needed for Pain (cancer-related pain).  Dispense: 112 tablet; Refill: 0  -     DULoxetine (CYMBALTA) 30 MG capsule; Take 1 capsule (30 mg total) by mouth once daily for 14 days, THEN 2 capsules (60 mg total) once daily for 14 days.  Dispense: 42 capsule; Refill: 0  -     ALPRAZolam (XANAX) 0.5 MG tablet; Take 1 tablet (0.5 mg total) by mouth 2 (two) times daily as needed for Anxiety.  Dispense: 56 tablet; Refill: 0    6. Neoplasm related pain  Overview:        Assessment & Plan:  Uncontrolled.  Multiple calcified LN to chest noted on chest CT March, 2025.  Now pain radiates from spine around torso   X-ray thoracic spine unrevealing  Continue Norco, will increase frequency to QID PRN. Did not tolerate oxy IR well previously.  Does not want to resume pregabalin at this time due to severe reaction to gabapentin.   Resume duloxetine; will ramp up dose.   Continue oncology f/u.      Orders:  -     HYDROcodone-acetaminophen (NORCO)  mg per tablet; Take 1 tablet by mouth every 6 (six) hours as needed for Pain (cancer-related pain).  Dispense: 112 tablet; Refill: 0    7. Malignant neoplasm of overlapping sites of right breast in female, estrogen  receptor positive  Assessment & Plan:  Followed by medical oncology  Stable disease on recent imaging.     Orders:  -     HYDROcodone-acetaminophen (NORCO)  mg per tablet; Take 1 tablet by mouth every 6 (six) hours as needed for Pain (cancer-related pain).  Dispense: 112 tablet; Refill: 0    8. Secondary cancer of bone  -     HYDROcodone-acetaminophen (NORCO)  mg per tablet; Take 1 tablet by mouth every 6 (six) hours as needed for Pain (cancer-related pain).  Dispense: 112 tablet; Refill: 0    9. Insomnia, unspecified type  Overview:  Would like to resume Zyprexa due to worsening nausea and insomnia lately    Xanax also helps calm her nerves and sleep    Orders:  -     Ambulatory referral/consult to Sleep Disorders; Future; Expected date: 05/26/2025    10. Anxiety and depression  Assessment & Plan:  Will improve pain mgmt and resume duloxetine and reassess.    Orders:  -     DULoxetine (CYMBALTA) 30 MG capsule; Take 1 capsule (30 mg total) by mouth once daily for 14 days, THEN 2 capsules (60 mg total) once daily for 14 days.  Dispense: 42 capsule; Refill: 0  -     ALPRAZolam (XANAX) 0.5 MG tablet; Take 1 tablet (0.5 mg total) by mouth 2 (two) times daily as needed for Anxiety.  Dispense: 56 tablet; Refill: 0    11. Lower abdominal pain  Assessment & Plan:  Remote onset, now with diarrhea the past few months.   No alarming signs today. Advised to f/u with PCP for further w/u.  Unclear cause. Suspect IBS versus radicular pain from known lumbar DDD.  Low FODMAP diet info provided.   Encouraged to use bentyl as prescribed.          Advance Care Planning   Advance Directives:   Living Will: Yes        Copy on chart: Yes    [place]: [place]    Agent's Name:  Sister: David Acevedo   Agent's Contact Number:  [phone]    Decision Making:  Patient answered questions  Goals of Care: The patient endorses that what is most important right now is to focus on symptom/pain control    Accordingly, we have decided that the  best plan to meet the patient's goals includes continuing with treatment       Foll    Thank you for involving me in the care of this patient.     No follow-ups on file.    Future Appointments   Date Time Provider Department Center   5/19/2025 11:30 AM [place] [facility]   6/19/2025  2:50 PM [place]   6/19/2025  3:30 PM Kiley Prieto NP [facility]   7/17/2025 12:50 PM [place] [facility]   7/17/2025  1:30 PM Donita Ruff MD [facility]        SUBJECTIVE:      History of Present Illness / Interval History:  Josey Flores is a 52 y.o. female with history of stage IV ER/AR+, HER2- breast cancer, thyroid cancer post thyroidectomy (2017), and cervical cancer (2017). Bone mets to left ilium and manubrium noted 2021. Medical history includes stroke with left-sided weakness June, 2023, CKD3, anxiety, COPD with asthma, hypothyroidism, OA, tobacco use, Right foot fracture 05/16/2024- Post fixation June 13, 2024- Dr. Prince ([facility]).      Presents to Palliative Care Clinic for pain and anxiety.   Please see previous palliative care and oncology notes for more details on pt's care so far.     4/10/25 Oncology  Ibrance can cause significant diarrhea and counseled her to up titrate her use of Imodium. If this does not work I can prescribe Lomotil. Also, aromatase inhibitors can cause severe arthralgias. We can try an alternative agent to see if this makes any difference. She is in agreement with these changes. I reviewed her most recent imaging which is stable.     5/19/25  History obtained from: patient, family, and medical record.    Weaning [place] for cancer-related pain.   Some lower rib pain relieved by muscle relaxer. Pain worse with movement. Poor memory, h/o B12 deficiency.   STOP BANG score 4    She would like Mediport removed.     Feels bad today, generalized pain and aches. Pain is worse with movement. Mostly rib  pain. Some improvement with Flexeril and Norco. Norco lasts a few hours.     Has not been taking duloxetine. Will resume and ramp up.     Gasping breathes while she sleeps, stops breathing at times.     Diarrhea. 4-5 watery BM/day. Lower abdominal pain. Remote onset.She attributes to radiation implants, began at that time. Oncology notes sx likely r/t Ibrance. Normal colonoscopy 8/2024. Takes Bentyl once daily. Unclear whether she is taking Lomotil.     Cannot take gabapentin - severe reaction. Reports she cannot tolerate pregabalin.     Today we discussed symptom management and goals of care.        ROS:  Review of Systems    Review of Symptoms      Symptom Assessment (ESAS 0-10 Scale)  Pain:  8  Dyspnea:  5  Anxiety:  7  Nausea:  5  Depression:  9  Anorexia:  0  Fatigue:  0  Insomnia:  10  Restlessness:  10  Agitation:  10     Constipation:  Negative  Diarrhea:  Positive      Pain Assessment:    Location(s): trunk    Trunk       Location: bilateral        Quality: Cramping, aching and shooting        Quantity: 8/10 in intensity        Chronicity: Onset 1 year(s) ago, unchanged        Aggravating Factors: None        Alleviating Factors: Opiates        Associated Symptoms: None    ECOG Performance Status stGstrstastdstest:st st1st Living Arrangements:  Lives with family    Psychosocial/Cultural:   See Palliative Psychosocial Note: Yes  **Primary  to Follow**  Palliative Care  Consult: No        Medications:  Current Medications[1]    External  database queried on 05/19/2025  by Nasra REID :         Review of patient's allergies indicates:   Allergen Reactions    Gabapentin Swelling     Note: unilateral joint edema, unclear if due to gabapentin    Nsaids (non-steroidal anti-inflammatory drug) Other (See Comments)     Instructed not to take NSAID drugs with chemo pill.    Zometa [zoledronic acid] Other (See Comments)     Possible osteonecrosis jaw    Clindamycin Rash    Vancomycin  analogues Itching        OBJECTIVE:   Physical Exam:  Vitals: Temp: 97.6 °F (36.4 °C) (25 0936)  Pulse: 96 (25 0936)  BP: 104/71 (25 0936)  SpO2: 96 % (25 0936)    Physical Exam  Constitutional:       General: She is not in acute distress.     Appearance: She is not ill-appearing.   HENT:      Mouth/Throat:      Mouth: Mucous membranes are moist.   Eyes:      General: No scleral icterus.  Cardiovascular:      Rate and Rhythm: Normal rate and regular rhythm.   Pulmonary:      Effort: Pulmonary effort is normal.      Breath sounds: Normal breath sounds.   Abdominal:      General: There is no distension.      Palpations: Abdomen is soft. There is no mass.      Tenderness: There is abdominal tenderness (lower bilat). There is no right CVA tenderness, left CVA tenderness or guarding.   Musculoskeletal:         General: No swelling, tenderness or deformity.      Cervical back: Neck supple.   Skin:     General: Skin is warm and dry.      Coloration: Skin is not jaundiced.      Findings: No bruising.   Neurological:      Mental Status: She is alert.   Psychiatric:         Mood and Affect: Mood normal.         Behavior: Behavior normal.         Thought Content: Thought content normal.         Judgment: Judgment normal.         Wt Readings from Last 3 Encounters:   25 0936 107.4 kg (236 lb 12.4 oz)   25 1132 107.3 kg (236 lb 8.9 oz)   04/10/25 1304 108.7 kg (239 lb 12 oz)     BP Readings from Last 3 Encounters:   25 104/71   04/10/25 119/75   25 97/64       Labs:  Lab Results   Component Value Date    WBC 8.84 04/10/2025    HGB 12.8 04/10/2025    HCT 38.9 04/10/2025     (H) 04/10/2025     04/10/2025           Imagin25   EXAM: XR THORACIC SPINE AP LATERAL  CLINICAL HISTORY: Thoracic Spine Pain  FINDINGS: No fracture, subluxation or disc space narrowing is identified in the thoracic spine.  Degenerative disc disease at C5-C6 and C6-C7.      Impression:  No  acute abnormality of the thoracic spine is appreciated.       I spent a total of 55 minutes on the day of the visit. This includes face to face time in discussion of goals of care, symptom assessment, coordination of care and emotional support.  This also includes non-face to face time preparing to see the patient (eg, review of tests/imaging), obtaining and/or reviewing separately obtained history, documenting clinical information in the electronic or other health record, independently interpreting results and communicating results to the patient/family/caregiver, or care coordinator.     Additional 11 min time spent on a voluntary advance care planning and /or goals of care discussion, providing emotional support, formulating and communicating prognosis and exploring burden/benefit of various approaches of treatment.       Nasra Cherry NP         [1]   Current Outpatient Medications:     albuterol (PROVENTIL/VENTOLIN HFA) 90 mcg/actuation inhaler, Inhale 2 puffs into the lungs every 4 (four) hours as needed for Wheezing or Shortness of Breath., Disp: 18 g, Rfl: 11    anastrozole (ARIMIDEX) 1 mg Tab, Take 1 tablet (1 mg total) by mouth once daily., Disp: 30 tablet, Rfl: 2    calcium carbonate 400 mg calcium (1,000 mg) Chew, Take 2,000 mg by mouth once daily., Disp: , Rfl:     cyclobenzaprine (FLEXERIL) 5 MG tablet, Take 1 tablet (5 mg total) by mouth 3 (three) times daily as needed for Muscle spasms., Disp: 60 tablet, Rfl: 1    dicyclomine (BENTYL) 20 mg tablet, Take 1 tablet (20 mg total) by mouth 3 (three) times daily as needed (abdominal pain)., Disp: 90 tablet, Rfl: 11    ergocalciferol (ERGOCALCIFEROL) 50,000 unit Cap, Take 1 capsule by mouth once a week, Disp: 12 capsule, Rfl: 0    ergocalciferol (VITAMIN D2) 50,000 unit Cap, Take 5,000 Units by mouth once daily at 6am. , Disp: , Rfl:     hydrOXYzine HCL (ATARAX) 25 MG tablet, Take 1 tablet (25 mg total) by mouth 3 (three) times daily as needed for  Itching., Disp: 90 tablet, Rfl: 1    ipratropium (ATROVENT) 42 mcg (0.06 %) nasal spray, 2 sprays by Nasal route 4 (four) times daily. As needed for rhinitis. Take in evening before bed and in the morning, Disp: 15 mL, Rfl: 11    multivitamin (THERAGRAN) per tablet, Take 1 tablet by mouth once daily., Disp: , Rfl:     naloxone (NARCAN) 4 mg/actuation Spry, SMARTSIG:Both Nares, Disp: , Rfl:     palbociclib (IBRANCE) 125 mg Cap, Take one capsule (125 mg) by mouth once daily on days 1-21 of each 28-day cycle., Disp: 21 capsule, Rfl: 11    potassium chloride SA (K-DUR,KLOR-CON) 20 MEQ tablet, Take 1 tablet (20 mEq total) by mouth once daily. Take daily for 3 days., Disp: 3 tablet, Rfl: 1    vitamin E 1000 UNIT capsule, Take 1 capsule (1,000 Units total) by mouth once daily., Disp: 30 capsule, Rfl: 11    ALPRAZolam (XANAX) 0.5 MG tablet, Take 1 tablet (0.5 mg total) by mouth 2 (two) times daily as needed for Anxiety., Disp: 56 tablet, Rfl: 0    clopidogreL (PLAVIX) 75 mg tablet, Take 1 tablet (75 mg total) by mouth once daily., Disp: 30 tablet, Rfl: 11    DULoxetine (CYMBALTA) 30 MG capsule, Take 1 capsule (30 mg total) by mouth once daily for 14 days, THEN 2 capsules (60 mg total) once daily for 14 days., Disp: 42 capsule, Rfl: 0    folic acid (FOLVITE) 1 MG tablet, Take 1 tablet (1 mg total) by mouth once daily., Disp: 100 tablet, Rfl: 3    HYDROcodone-acetaminophen (NORCO)  mg per tablet, Take 1 tablet by mouth every 6 (six) hours as needed for Pain (cancer-related pain)., Disp: 112 tablet, Rfl: 0    levothyroxine (SYNTHROID) 300 MCG Tab, Take 1 tablet (300 mcg total) by mouth before breakfast., Disp: 30 tablet, Rfl: 11    pentoxifylline (TRENTAL) 400 mg TbSR, Take 1 tablet (400 mg total) by mouth 2 (two) times daily with meals., Disp: 60 tablet, Rfl: 11

## 2025-05-19 NOTE — PATIENT INSTRUCTIONS
"Follow up with Dr Persaud for further work up of diarrhea and possible sleep study referral.     Increase Bentyl to 3 times daily as needed for abdominal pain/as prescribed.     What is a low-FODMAP diet?  FODMAP stands for "fermentable oligosaccharides, disaccharides, monosaccharides, and polyols." These are types of carbohydrates that are harder for the body to digest than other types. A low-FODMAP diet means limiting foods that contain these kinds of carbohydrates.  Because foods with FODMAPs are hard to digest, they:  ?Pull extra water into your intestines  ?Cause the normal bacteria in your intestines to make gas  For some people, this does not cause any problems. But for some people, eating FODMAPs makes digestive symptoms worse.    Why do I need a low-FODMAP diet?  Your doctor or nurse might suggest that you try a low-FODMAP diet if you have problems such as diarrhea, belly pain or bloating. These symptoms often happen in people with irritable bowel syndrome ("IBS") or active inflammatory bowel disease ("IBD").    What should I know about a low-FODMAP diet before starting?  A low-FODMAP diet can mean avoiding or limiting a lot of foods. If your doctor or nurse suggests that you try this diet, they will often have you work with a dietitian. A dietitian can help make sure that you are getting the right nutrition.  Most people are advised to start by avoiding a lot of foods with FODMAPs for 6 to 8 weeks as their symptoms get better. Then, they slowly start to add these foods back to their diet 1 at a time while watching for symptoms to start again. This helps them figure out what types and amounts of FODMAPs they can eat and still feel good.    What can I eat and drink on a low-FODMAP diet?  Below are some examples of foods that are low in FODMAPs.  ?Grains - White rice, brown rice, quinoa, cornmeal, and oats. Choose grains that are marked certified gluten-free.  ?Dairy products - Lactose-free dairy products like " almond milk and oat milk. Certain cheeses like cheddar, Nigel, brie, mozzarella, parmesan, and feta.  ?Meats and proteins - Lean, tender, well-cooked meats, chicken, and fish. Eggs, tempeh, tofu, peanuts, pecans, macadamia nuts, walnuts, pine nuts, pumpkin seed, sesame seeds, sunflower seeds, and nut butters made with these nuts.  ?Fruits - Grapes, oranges, strawberries, blueberries, raspberries, pineapple, honeydew melon, cantaloupe, kiwi, limes, and starfruit.  ?Vegetables - Zucchini, cucumbers, eggplant, squash, potatoes, celery, carrots, bell peppers, bean sprouts, radishes, kale, spinach, bok andres, green beans, and lettuce.  ?Other foods - Canola and olive oils and dark chocolate. Use maple syrup, brown sugar, stevia, or table sugar as sweeteners.    What foods and drinks should I avoid on a low-FODMAP diet?  Below are some examples of foods that are high in FODMAPs. Avoid these foods.  ?Grains to avoid - Wheat, barley, rye, and products made with these grains such as bread, cereal, biscuits, and crackers.  ?Dairy products to avoid - Dairy products with lactose like milk and milk products like custard, yogurt, buttermilk, soft cheese, whipped cream, cottage cheese, ice cream, and soy milk.  ?Meats and proteins to avoid - Pistachios, cashews, and nut butters made from these nuts. Hummus, dried peas, lentils, beans, and other legumes.  ?Fruits to avoid - Apples, pears, mangoes, cherries, apricots, nectarines, plums, watermelon, peaches, blackberries, prunes, avocados, and fruit juices made with these fruits.  ?Vegetables to avoid - Onions, leeks, garlic, shallots, asparagus, sugar snap peas, mushrooms, cauliflower, artichokes, and green peas.  ?Other foods to avoid - High-fructose corn syrup, agave nectar, honey, sanam and chamomile tea, soda, sports drinks, milk chocolate. Low-calorie sweeteners such as xylitol, which is found in sugar-free gum and mints. Added fibers in the form of chicory root or inulin.

## 2025-05-19 NOTE — ASSESSMENT & PLAN NOTE
Remote onset, now with diarrhea the past few months.   No alarming signs today. Advised to f/u with PCP for further w/u.  Unclear cause. Suspect IBS versus radicular pain from known lumbar DDD.  Low FODMAP diet info provided.   Encouraged to use bentyl as prescribed.

## 2025-05-19 NOTE — ASSESSMENT & PLAN NOTE
Uncontrolled.  Multiple calcified LN to chest noted on chest CT March, 2025.  Now pain radiates from spine around torso   X-ray thoracic spine unrevealing  Continue Norco, will increase frequency to QID PRN. Did not tolerate oxy IR well previously.  Does not want to resume pregabalin at this time due to severe reaction to gabapentin.   Resume duloxetine; will ramp up dose.   Continue oncology f/u.

## 2025-05-24 LAB
1OH-MIDAZOLAM UR QL SCN: NOT DETECTED
6MAM UR QL: NOT DETECTED
7AMINOCLONAZEPAM UR QL: NOT DETECTED
A-OH ALPRAZ UR QL: PRESENT
ALPRAZ UR QL: PRESENT
AMPHET UR QL SCN: NOT DETECTED
ANNOTATION COMMENT IMP: NORMAL
AR NOROXYMORPHONE (CUTOFF 100 NG/ML): NOT DETECTED
AR OXYMORPHONE (CUTOFF 40 NG/ML): NOT DETECTED
BARBITURATES UR QL: NEGATIVE
BUPRENORPHINE UR QL: NOT DETECTED
BZE UR QL: NEGATIVE
CARBOXYTHC UR QL: NORMAL
CARISOPRODOL UR QL: NEGATIVE
CLONAZEPAM UR QL: NOT DETECTED
CODEINE UR QL: NOT DETECTED
CREAT UR-MCNC: 374.4 MG/DL
DIAZEPAM UR QL: NOT DETECTED
ETHYL GLUCURONIDE UR QL: NEGATIVE
FENTANYL UR QL: NOT DETECTED
GABAPENTIN UR QL CFM: NOT DETECTED
HYDROCODONE UR QL: PRESENT
HYDROMORPHONE UR QL: PRESENT
LORAZEPAM UR QL: NOT DETECTED
MDA UR QL: NOT DETECTED
MDEA UR QL: NOT DETECTED
MDMA UR QL: NOT DETECTED
ME-PHENIDATE UR QL: NOT DETECTED
METHADONE UR QL: NEGATIVE
METHAMPHET UR QL: NOT DETECTED
MIDAZOLAM UR QL SCN: NOT DETECTED
MORPHINE UR QL: NOT DETECTED
NALOXONE UR QL CFM: NOT DETECTED
NORBUPRENORPHINE UR QL CFM: NOT DETECTED
NORDIAZEPAM UR QL: NOT DETECTED
NORFENTANYL UR QL: NOT DETECTED
NORMEPERIDINE UR QL CFM: NOT DETECTED
NOROXYCODONE UR QL CFM: PRESENT
NOROXYMORPHONE UR QL SCN: NOT DETECTED
OXAZEPAM UR QL: NOT DETECTED
OXYCODONE UR QL: NOT DETECTED
PATHOLOGY STUDY: NORMAL
PCP UR QL: NEGATIVE
PHENTERMINE UR QL: NOT DETECTED
PREGABALIN UR QL CFM: NOT DETECTED
SERVICE CMNT-IMP: NORMAL
TAPENTADOL UR QL SCN: NOT DETECTED
TAPENTADOL UR QL SCN: NOT DETECTED
TEMAZEPAM UR QL: NOT DETECTED
TRAMADOL UR QL: NEGATIVE
ZOLPIDEM PHENYL-4-CARB UR QL SCN: NOT DETECTED
ZOLPIDEM UR QL: NOT DETECTED

## 2025-06-09 ENCOUNTER — INFUSION (OUTPATIENT)
Dept: INFUSION THERAPY | Facility: HOSPITAL | Age: 55
End: 2025-06-09
Attending: INTERNAL MEDICINE
Payer: MEDICAID

## 2025-06-09 ENCOUNTER — OFFICE VISIT (OUTPATIENT)
Dept: PALLIATIVE MEDICINE | Facility: CLINIC | Age: 55
End: 2025-06-09
Payer: MEDICAID

## 2025-06-09 VITALS
HEART RATE: 96 BPM | OXYGEN SATURATION: 99 % | SYSTOLIC BLOOD PRESSURE: 110 MMHG | TEMPERATURE: 98 F | WEIGHT: 237.19 LBS | HEIGHT: 67 IN | DIASTOLIC BLOOD PRESSURE: 72 MMHG | BODY MASS INDEX: 37.23 KG/M2

## 2025-06-09 DIAGNOSIS — C79.51 SECONDARY CANCER OF BONE: ICD-10-CM

## 2025-06-09 DIAGNOSIS — C50.811 MALIGNANT NEOPLASM OF OVERLAPPING SITES OF RIGHT BREAST IN FEMALE, ESTROGEN RECEPTOR POSITIVE: ICD-10-CM

## 2025-06-09 DIAGNOSIS — Z51.5 PALLIATIVE CARE ENCOUNTER: Primary | ICD-10-CM

## 2025-06-09 DIAGNOSIS — G89.3 NEOPLASM RELATED PAIN: ICD-10-CM

## 2025-06-09 DIAGNOSIS — C79.81 METASTATIC MALIGNANT NEOPLASM TO BREAST: ICD-10-CM

## 2025-06-09 DIAGNOSIS — Z17.0 MALIGNANT NEOPLASM OF OVERLAPPING SITES OF RIGHT BREAST IN FEMALE, ESTROGEN RECEPTOR POSITIVE: ICD-10-CM

## 2025-06-09 DIAGNOSIS — C77.3 MALIGNANT NEOPLASM METASTATIC TO LYMPH NODE OF AXILLA: Primary | ICD-10-CM

## 2025-06-09 PROCEDURE — 96523 IRRIG DRUG DELIVERY DEVICE: CPT

## 2025-06-09 PROCEDURE — 99999 PR PBB SHADOW E&M-EST. PATIENT-LVL IV: CPT | Mod: PBBFAC,,, | Performed by: NURSE PRACTITIONER

## 2025-06-09 PROCEDURE — 63600175 PHARM REV CODE 636 W HCPCS: Performed by: INTERNAL MEDICINE

## 2025-06-09 PROCEDURE — 99214 OFFICE O/P EST MOD 30 MIN: CPT | Mod: PBBFAC,25 | Performed by: NURSE PRACTITIONER

## 2025-06-09 RX ORDER — HEPARIN 100 UNIT/ML
500 SYRINGE INTRAVENOUS
Status: DISCONTINUED | OUTPATIENT
Start: 2025-06-09 | End: 2025-06-09 | Stop reason: HOSPADM

## 2025-06-09 RX ORDER — HYDROCODONE BITARTRATE AND ACETAMINOPHEN 10; 325 MG/1; MG/1
1 TABLET ORAL EVERY 8 HOURS PRN
Qty: 84 TABLET | Refills: 0 | Status: SHIPPED | OUTPATIENT
Start: 2025-06-18 | End: 2025-07-16

## 2025-06-09 RX ORDER — SODIUM CHLORIDE 0.9 % (FLUSH) 0.9 %
10 SYRINGE (ML) INJECTION
Status: DISCONTINUED | OUTPATIENT
Start: 2025-06-09 | End: 2025-06-09 | Stop reason: HOSPADM

## 2025-06-09 RX ORDER — CYCLOBENZAPRINE HCL 5 MG
5 TABLET ORAL 3 TIMES DAILY PRN
COMMUNITY
Start: 2025-05-13

## 2025-06-09 RX ADMIN — HEPARIN 500 UNITS: 100 SYRINGE at 12:06

## 2025-06-09 NOTE — PATIENT INSTRUCTIONS
"Measures to improve Opioid Induced Constipation:  Lifestyle Changes  Measures to prevent constipation include:  eating an adequate fiber in the diet  drinking ample water  exercising to encourage motility of the bowels  limiting intake of other painkillers  using a laxative.     Dietary  There are many fiber-rich foods that one can eat to treat constipation.  Fruits like apples, bananas, prunes, pears, raspberries, and vegetables like string beans, broccoli, spinach, kale, squash, lentils, peas, and beans are often recommended.   One can also eat almost any type of bran products (usually cereals) and nuts.   When eating foods with fiber, it is important not to consume more than 25 to 30 grams per day otherwise it can lead to a bloating sensation.     Laxative choices include polyethylene glycol (Miralax), senna (Sennakot), bisacodyl (Dulcolax) and milk of magnesia. This is my order of preference.           What is a low-FODMAP diet?  FODMAP stands for "fermentable oligosaccharides, disaccharides, monosaccharides, and polyols." These are types of carbohydrates that are harder for the body to digest than other types. A low-FODMAP diet means limiting foods that contain these kinds of carbohydrates.  Because foods with FODMAPs are hard to digest, they:  ?Pull extra water into your intestines  ?Cause the normal bacteria in your intestines to make gas  For some people, this does not cause any problems. But for some people, eating FODMAPs makes digestive symptoms worse.    What can I eat and drink on a low-FODMAP diet?  Below are some examples of foods that are low in FODMAPs.  ?Grains - White rice, brown rice, quinoa, cornmeal, and oats. Choose grains that are marked certified gluten-free.  ?Dairy products - Lactose-free dairy products like almond milk and oat milk. Certain cheeses like cheddar, Nigel, brie, mozzarella, parmesan, and feta.  ?Meats and proteins - Lean, tender, well-cooked meats, chicken, and fish. Eggs, " tempeh, tofu, peanuts, pecans, macadamia nuts, walnuts, pine nuts, pumpkin seed, sesame seeds, sunflower seeds, and nut butters made with these nuts.  ?Fruits - Grapes, oranges, strawberries, blueberries, raspberries, pineapple, honeydew melon, cantaloupe, kiwi, limes, and starfruit.  ?Vegetables - Zucchini, cucumbers, eggplant, squash, potatoes, celery, carrots, bell peppers, bean sprouts, radishes, kale, spinach, bok andres, green beans, and lettuce.  ?Other foods - Canola and olive oils and dark chocolate. Use maple syrup, brown sugar, stevia, or table sugar as sweeteners.    What foods and drinks should I avoid on a low-FODMAP diet?  Below are some examples of foods that are high in FODMAPs. Avoid these foods.  ?Grains to avoid - Wheat, barley, rye, and products made with these grains such as bread, cereal, biscuits, and crackers.  ?Dairy products to avoid - Dairy products with lactose like milk and milk products like custard, yogurt, buttermilk, soft cheese, whipped cream, cottage cheese, ice cream, and soy milk.  ?Meats and proteins to avoid - Pistachios, cashews, and nut butters made from these nuts. Hummus, dried peas, lentils, beans, and other legumes.  ?Fruits to avoid - Apples, pears, mangoes, cherries, apricots, nectarines, plums, watermelon, peaches, blackberries, prunes, avocados, and fruit juices made with these fruits.  ?Vegetables to avoid - Onions, leeks, garlic, shallots, asparagus, sugar snap peas, mushrooms, cauliflower, artichokes, and green peas.  ?Other foods to avoid - High-fructose corn syrup, agave nectar, honey, sanam and chamomile tea, soda, sports drinks, milk chocolate. Low-calorie sweeteners such as xylitol, which is found in sugar-free gum and mints. Added fibers in the form of chicory root or inulin.       Schedule appt with sleep clinic.     If abdominal pain or nausea worsens or you begin vomiting then you need a medical exam ASAP.

## 2025-06-09 NOTE — ASSESSMENT & PLAN NOTE
Impression:  Symptoms:  pain to torso, constipation alternating with diarrhea  Medicolegal: Ms Flores does have decision making capacity.    Sister Niranjan Acevedo is HCPOA.  Psychosocial:  support system consists of significant other  Understanding of disease and Illness Trajectory: Patient and Significant Other  has  adequate understanding of her illness, they can benefit from continued education on what to expect in the future.  Goals of care:  Improve pain management and QOL. Control cancer as long as possible.     Summary and recommendations:Continue POC.

## 2025-06-09 NOTE — NURSING
"Pt here for Mediport Flush. Right chestwall mediport accessed with a 20g 1" tomlni via sterile technique.  Excellent blood return noted after flush bag started to gravity.  Flushed with 10ml NS and 5 ml heparin solution.  Needle D/C, site without redness, swelling, or drainage noted.  Dressing applied.  Patient tolerated well.  "

## 2025-06-09 NOTE — ASSESSMENT & PLAN NOTE
Continue Norco PRN.   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   Recently resumed duloxetine; will ramp up dose.   Continues oncology f/u.

## 2025-06-09 NOTE — ASSESSMENT & PLAN NOTE
Followed by medical oncology  Continues Ibrance, Arimidex.  Mets to bone  Stable disease on most recent imaging.

## 2025-06-09 NOTE — PROGRESS NOTES
Palliative Medicine Clinic Note    Chief Complaint: No chief complaint on file.  F/u cancer-related symptoms     ASSESSMENT/PLAN:      Plan/Recommendations:  1. Palliative care encounter  Assessment & Plan:  Impression:  Symptoms:  pain to torso, constipation alternating with diarrhea  Medicolegal: Ms Flores does have decision making capacity.    Sister Niranjan Acevedo is HCPOA.  Psychosocial:  support system consists of significant other  Understanding of disease and Illness Trajectory: Patient and Significant Other  has  adequate understanding of her illness, they can benefit from continued education on what to expect in the future.  Goals of care:  Improve pain management and QOL. Control cancer as long as possible.     Summary and recommendations:Continue POC.                2. Metastatic malignant neoplasm to breast  -     HYDROcodone-acetaminophen (NORCO)  mg per tablet; Take 1 tablet by mouth every 8 (eight) hours as needed for Pain (cancer-related pain).  Dispense: 84 tablet; Refill: 0    3. Neoplasm related pain  Overview:        Assessment & Plan:  Continue Norco PRN.   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   Recently resumed duloxetine; will ramp up dose.   Continues oncology f/u.      Orders:  -     HYDROcodone-acetaminophen (NORCO)  mg per tablet; Take 1 tablet by mouth every 8 (eight) hours as needed for Pain (cancer-related pain).  Dispense: 84 tablet; Refill: 0    4. Malignant neoplasm of overlapping sites of right breast in female, estrogen receptor positive  Assessment & Plan:  Followed by medical oncology  Continues Ibrance Arimidex.  Mets to bone  Stable disease on most recent imaging.     Orders:  -     HYDROcodone-acetaminophen (NORCO)  mg per tablet; Take 1 tablet by mouth every 8 (eight) hours as needed for Pain  (cancer-related pain).  Dispense: 84 tablet; Refill: 0    5. Secondary cancer of bone  -     HYDROcodone-acetaminophen (NORCO)  mg per tablet; Take 1 tablet by mouth every 8 (eight) hours as needed for Pain (cancer-related pain).  Dispense: 84 tablet; Refill: 0        Advance Care Planning   Advance Directives:   Living Will: Yes        Copy on chart: Yes    [place]: Yes    Agent's Name:  Sister: David Acevedo    Decision Making:  Patient answered questions  Goals of Care: What is most important right now is to focus on symptom/pain control. Accordingly, we have decided that the best plan to meet the patient's goals includes continuing with treatment.           Thank you for involving me in the care of this patient.     Follow up in about 5 weeks (around 7/15/2025).    Future Appointments   Date Time Provider Department Center   6/19/2025  2:50 PM DIEGO BOWER [facility] LAB [facility]   6/19/2025  3:30 PM Kiley Prieto NP [facility] HEM ONC [facility]   7/17/2025 12:50 PM BATMIRNA BOWER [facility]   7/17/2025  1:30 PM Donita Ruff MD [facility] HEM ONC [facility]   8/4/2025  1:30 PM INJECTION 1, [facility] INFUSION [facility]        SUBJECTIVE:      History of Present Illness / Interval History:  Josey Flores is a 52 y.o. female with history of stage IV ER/GA+, HER2- breast cancer, thyroid cancer post thyroidectomy (2017), and cervical cancer (2017). Bone mets to left ilium and manubrium noted 2021. Medical history includes stroke with left-sided weakness June, 2023, CKD3, anxiety, COPD with asthma, hypothyroidism, OA, tobacco use, Right foot fracture 05/16/2024- Post fixation June 13, 2024- Dr. Prince ([facility]).      Presents to Palliative Care Clinic for pain and anxiety.   Please see previous palliative care and oncology notes for more details on pt's care so far.     5/[facility]  KUB shows moderate amount of stool in the right side of colon as well as nonobstructive gas pattern  "otherwise unremarkable. Labs are overall normal. Patient does have baseline CKD and levels were consistent with her previous. Patient did have improvement with her discomfort following IV fluid resuscitation. There was mild liver enzyme elevation in the setting of her known fatty liver disease. She appears stable and vitals have been within normal range since arrival. She has been unable to provide a stool sample for GI panel. Patient's symptoms are chronic nature. I do suspect a component of her diarrhea is related to her current medications. I recommend continuing with Bentyl at home. Since patient was unable to provide a stool sample here we did provide appropriate collection material and instructions to get a sample at home to bring to the lab. She is currently tolerating p.o. intake. I would strongly recommend that she follow up with 1 of her providers next week for re-evaluation.       Interval History  History obtained from: patient, family, medical record, and care everywhere.    LOV duloxetine resumed, dose ramped up, olanzapine resumed, refilled Norco and xanax PRN. Low FODMAP diet recommended for diarrhea. Referred to sleep clinic.     Taking hydrocodone 3-4 each days PRN abd pain. Also taking Bentyl PRN 2-3 times/daily.    Lots of lower abd pain today. Went to ER at [facility] and said she had near bowel obstruction. One week ago took Fleets enema, no results. Last soft formed BM was 3 days ago, loose stools since. Believes pain is getting better since ER visit. Worse when supine. No vomiting, frequent nausea.     "Muscle spasms" across rib cage, chronic, worse with mvmt. Relieved by Norco. She has resumed duloxetine 2 days ago, no improvement in sx.       Today we discussed symptom management and advanced care planning.        ROS:  Review of Systems    Review of Symptoms      Symptom Assessment (ESAS 0-10 Scale)  Pain:  8  Dyspnea:  5  Anxiety:  8  Nausea:  4  Depression:  8  Anorexia:  0  Fatigue:  " 3  Insomnia:  10  Restlessness:  8  Agitation:  10         Pain Assessment:    Location(s): generalized    Generalized       Location: bilateral        Quality: Aching, burning and shooting        Quantity: 8/10 in intensity        Chronicity: Onset 2 year(s) ago, unchanged        Aggravating Factors: Movement and recumbency        Alleviating Factors: Opiates        Associated Symptoms: None    ECOG Performance Status rdGrdrrdarddrderd:rd rd3rd Living Arrangements:  Lives with family    Psychosocial/Cultural:   See Palliative Psychosocial Note: No  **Primary  to Follow**  Palliative Care  Consult: No        Medications:  Current Medications[1]    External  database queried on 06/09/2025  by Nasra REID :         Review of patient's allergies indicates:   Allergen Reactions    Gabapentin Swelling     Note: unilateral joint edema, unclear if due to gabapentin    Nsaids (non-steroidal anti-inflammatory drug) Other (See Comments)     Instructed not to take NSAID drugs with chemo pill.    Zometa [zoledronic acid] Other (See Comments)     Possible osteonecrosis jaw    Clindamycin Rash    Vancomycin analogues Itching        OBJECTIVE:   Physical Exam:  Vitals: Temp: 97.9 °F (36.6 °C) (06/09/25 1137)  Pulse: 96 (06/09/25 1137)  BP: 110/72 (06/09/25 1137)  SpO2: 99 % (06/09/25 1137)    Physical Exam      Labs:  Lab Results   Component Value Date    WBC 8.84 04/10/2025    HGB 12.8 04/10/2025    HCT 38.9 04/10/2025     (H) 04/10/2025     04/10/2025           Imaging:  3/5/25 CT CAP  CLINICAL HISTORY:  Breast cancer, invasive, stage IV, assess treatment response; [C50.811]-Malignant neoplasm of overlapping sites of right female breast./[Z17.0]-Estrogen receptor positive status (ER+)./[C50.919]-Malignant neoplasm of unspecified site of TRUNCATED ...     TECHNIQUE:  Routine contrast-enhanced CT of the chest, abdomen and pelvis was performed  after the intravenous administration of  contrast. All CT scans at [this location] are performed using dose modulation techniques as appropriate to a performed exam including the following: automated exposure control; adjustment of the mA and/or kV according to patient size (this includes techniques or standardized protocols for targeted exams where dose is matched to indication / reason for exam; i.e. extremities or head); use of iterative reconstruction technique.     Comparison: 08/14/2024     FINDINGS: No acute or suspicious osseous abnormality is seen.     CHEST: Normal heart size.  Mild coronary artery calcifications.  No aortic aneurysm.  Stable calcified granulomas in the right and calcified right hilar and mediastinal lymph nodes.  Stable 6 mm subpleural opacity lateral right upper lobe image 41 series 2 which has a benign linear morphology on coronal image 101.  No suspicious pulmonary nodules.  Stable emphysema.  No pleural effusion.  No pathologic adenopathy.     Abdomen/pelvis: No ascites, free air or lymphadenopathy is identified.  Cholecystectomy.  Hepatic steatosis.  The liver, bile ducts, pancreas, spleen and adrenals are otherwise unremarkable.  Stable small renal cysts.  The kidneys, ureters and bladder are otherwise unremarkable.  The small bowel, colon and appendix are unremarkable.  No evidence of bowel obstruction or acute inflammatory process.  No aortic aneurysm.        Impression:     1.  No evidence of metastatic disease in the chest, abdomen or pelvis.     I spent a total of 45 minutes on the day of the visit. This includes face to face time in discussion of goals of care, symptom assessment, coordination of care and emotional support.  This also includes non-face to face time preparing to see the patient (eg, review of tests/imaging), obtaining and/or reviewing separately obtained history, documenting clinical information in the electronic or other health record, independently interpreting results and communicating results to  the patient/family/caregiver, or care coordinator.         Nasra Cherry NP         [1]   Current Outpatient Medications:     albuterol (PROVENTIL/VENTOLIN HFA) 90 mcg/actuation inhaler, Inhale 2 puffs into the lungs every 4 (four) hours as needed for Wheezing or Shortness of Breath., Disp: 18 g, Rfl: 11    ALPRAZolam (XANAX) 0.5 MG tablet, Take 1 tablet (0.5 mg total) by mouth 2 (two) times daily as needed for Anxiety., Disp: 56 tablet, Rfl: 0    anastrozole (ARIMIDEX) 1 mg Tab, Take 1 tablet (1 mg total) by mouth once daily., Disp: 30 tablet, Rfl: 2    calcium carbonate 400 mg calcium (1,000 mg) Chew, Take 2,000 mg by mouth once daily., Disp: , Rfl:     cyclobenzaprine (FLEXERIL) 5 MG tablet, Take 5 mg by mouth 3 (three) times daily as needed., Disp: , Rfl:     dicyclomine (BENTYL) 20 mg tablet, Take 1 tablet (20 mg total) by mouth 3 (three) times daily as needed (abdominal pain)., Disp: 90 tablet, Rfl: 11    DULoxetine (CYMBALTA) 30 MG capsule, Take 1 capsule (30 mg total) by mouth once daily for 14 days, THEN 2 capsules (60 mg total) once daily for 14 days., Disp: 42 capsule, Rfl: 0    ergocalciferol (ERGOCALCIFEROL) 50,000 unit Cap, Take 1 capsule by mouth once a week, Disp: 12 capsule, Rfl: 0    ergocalciferol (VITAMIN D2) 50,000 unit Cap, Take 5,000 Units by mouth once daily at 6am. , Disp: , Rfl:     folic acid (FOLVITE) 1 MG tablet, Take 1 tablet (1 mg total) by mouth once daily., Disp: 100 tablet, Rfl: 3    hydrOXYzine HCL (ATARAX) 25 MG tablet, Take 1 tablet (25 mg total) by mouth 3 (three) times daily as needed for Itching., Disp: 90 tablet, Rfl: 1    ipratropium (ATROVENT) 42 mcg (0.06 %) nasal spray, 2 sprays by Nasal route 4 (four) times daily. As needed for rhinitis. Take in evening before bed and in the morning, Disp: 15 mL, Rfl: 11    multivitamin (THERAGRAN) per tablet, Take 1 tablet by mouth once daily., Disp: , Rfl:     naloxone (NARCAN) 4 mg/actuation Spry, SMARTSIG:Both Nares, Disp: , Rfl:      palbociclib (IBRANCE) 125 mg Cap, Take one capsule (125 mg) by mouth once daily on days 1-21 of each 28-day cycle., Disp: 21 capsule, Rfl: 11    potassium chloride SA (K-DUR,KLOR-CON) 20 MEQ tablet, Take 1 tablet (20 mEq total) by mouth once daily. Take daily for 3 days., Disp: 3 tablet, Rfl: 1    vitamin E 1000 UNIT capsule, Take 1 capsule (1,000 Units total) by mouth once daily., Disp: 30 capsule, Rfl: 11    clopidogreL (PLAVIX) 75 mg tablet, Take 1 tablet (75 mg total) by mouth once daily., Disp: 30 tablet, Rfl: 11    [START ON 6/18/2025] HYDROcodone-acetaminophen (NORCO)  mg per tablet, Take 1 tablet by mouth every 8 (eight) hours as needed for Pain (cancer-related pain)., Disp: 84 tablet, Rfl: 0    levothyroxine (SYNTHROID) 300 MCG Tab, Take 1 tablet (300 mcg total) by mouth before breakfast., Disp: 30 tablet, Rfl: 11    pentoxifylline (TRENTAL) 400 mg TbSR, Take 1 tablet (400 mg total) by mouth 2 (two) times daily with meals., Disp: 60 tablet, Rfl: 11  No current facility-administered medications for this visit.    Facility-Administered Medications Ordered in Other Visits:     alteplase injection 2 mg, 2 mg, Intra-Catheter, PRN, Luis Joseph MD    heparin, porcine (PF) 100 unit/mL injection flush 500 Units, 500 Units, Intravenous, PRN, Luis Joseph MD, 500 Units at 06/09/25 1236    sodium chloride 0.9% flush 10 mL, 10 mL, Intravenous, PRN, Luis Joseph MD

## 2025-06-18 DIAGNOSIS — F41.9 ANXIETY AND DEPRESSION: ICD-10-CM

## 2025-06-18 DIAGNOSIS — F32.A ANXIETY AND DEPRESSION: ICD-10-CM

## 2025-06-18 DIAGNOSIS — G89.3 NEOPLASM RELATED PAIN: ICD-10-CM

## 2025-06-18 DIAGNOSIS — Z17.0 MALIGNANT NEOPLASM OF OVERLAPPING SITES OF RIGHT BREAST IN FEMALE, ESTROGEN RECEPTOR POSITIVE: ICD-10-CM

## 2025-06-18 DIAGNOSIS — C50.811 MALIGNANT NEOPLASM OF OVERLAPPING SITES OF RIGHT BREAST IN FEMALE, ESTROGEN RECEPTOR POSITIVE: ICD-10-CM

## 2025-06-18 DIAGNOSIS — C79.51 SECONDARY CANCER OF BONE: ICD-10-CM

## 2025-06-18 DIAGNOSIS — C79.81 METASTATIC MALIGNANT NEOPLASM TO BREAST: ICD-10-CM

## 2025-06-18 RX ORDER — HYDROCODONE BITARTRATE AND ACETAMINOPHEN 10; 325 MG/1; MG/1
1 TABLET ORAL EVERY 8 HOURS PRN
Qty: 84 TABLET | Refills: 0 | OUTPATIENT
Start: 2025-06-18 | End: 2025-07-16

## 2025-06-18 RX ORDER — ALPRAZOLAM 0.5 MG/1
0.5 TABLET ORAL 2 TIMES DAILY PRN
Qty: 56 TABLET | Refills: 0 | Status: SHIPPED | OUTPATIENT
Start: 2025-06-18 | End: 2025-07-16

## 2025-07-14 ENCOUNTER — PATIENT MESSAGE (OUTPATIENT)
Dept: HEMATOLOGY/ONCOLOGY | Facility: CLINIC | Age: 55
End: 2025-07-14
Payer: MEDICAID

## 2025-07-15 DIAGNOSIS — F32.A ANXIETY AND DEPRESSION: ICD-10-CM

## 2025-07-15 DIAGNOSIS — F41.9 ANXIETY AND DEPRESSION: ICD-10-CM

## 2025-07-15 RX ORDER — ANASTROZOLE 1 MG/1
1 TABLET ORAL DAILY
Qty: 30 TABLET | Refills: 2 | Status: SHIPPED | OUTPATIENT
Start: 2025-07-15 | End: 2026-07-15

## 2025-07-17 ENCOUNTER — INFUSION (OUTPATIENT)
Dept: INFUSION THERAPY | Facility: HOSPITAL | Age: 55
End: 2025-07-17
Attending: INTERNAL MEDICINE
Payer: MEDICAID

## 2025-07-17 ENCOUNTER — OFFICE VISIT (OUTPATIENT)
Dept: HEMATOLOGY/ONCOLOGY | Facility: CLINIC | Age: 55
End: 2025-07-17
Payer: MEDICAID

## 2025-07-17 ENCOUNTER — LAB VISIT (OUTPATIENT)
Dept: LAB | Facility: HOSPITAL | Age: 55
End: 2025-07-17
Attending: NURSE PRACTITIONER
Payer: MEDICAID

## 2025-07-17 VITALS
HEART RATE: 79 BPM | SYSTOLIC BLOOD PRESSURE: 107 MMHG | RESPIRATION RATE: 18 BRPM | TEMPERATURE: 98 F | DIASTOLIC BLOOD PRESSURE: 73 MMHG | OXYGEN SATURATION: 96 %

## 2025-07-17 VITALS
HEIGHT: 67 IN | DIASTOLIC BLOOD PRESSURE: 64 MMHG | SYSTOLIC BLOOD PRESSURE: 98 MMHG | HEART RATE: 94 BPM | WEIGHT: 233.69 LBS | TEMPERATURE: 98 F | OXYGEN SATURATION: 97 % | BODY MASS INDEX: 36.68 KG/M2

## 2025-07-17 DIAGNOSIS — C50.811 MALIGNANT NEOPLASM OF OVERLAPPING SITES OF RIGHT BREAST IN FEMALE, ESTROGEN RECEPTOR POSITIVE: ICD-10-CM

## 2025-07-17 DIAGNOSIS — C73 PAPILLARY THYROID CARCINOMA: ICD-10-CM

## 2025-07-17 DIAGNOSIS — L29.9 PRURITUS: ICD-10-CM

## 2025-07-17 DIAGNOSIS — T45.1X5A CHEMOTHERAPY-INDUCED NAUSEA: Primary | ICD-10-CM

## 2025-07-17 DIAGNOSIS — C77.3 MALIGNANT NEOPLASM METASTATIC TO LYMPH NODE OF AXILLA: ICD-10-CM

## 2025-07-17 DIAGNOSIS — G89.3 NEOPLASM RELATED PAIN: ICD-10-CM

## 2025-07-17 DIAGNOSIS — Z17.0 MALIGNANT NEOPLASM OF OVERLAPPING SITES OF RIGHT BREAST IN FEMALE, ESTROGEN RECEPTOR POSITIVE: ICD-10-CM

## 2025-07-17 DIAGNOSIS — E03.9 HYPOTHYROIDISM, UNSPECIFIED TYPE: ICD-10-CM

## 2025-07-17 DIAGNOSIS — C77.3 MALIGNANT NEOPLASM METASTATIC TO LYMPH NODE OF AXILLA: Primary | ICD-10-CM

## 2025-07-17 DIAGNOSIS — C79.51 SECONDARY CANCER OF BONE: ICD-10-CM

## 2025-07-17 DIAGNOSIS — F32.A ANXIETY AND DEPRESSION: ICD-10-CM

## 2025-07-17 DIAGNOSIS — C50.919 PRIMARY MALIGNANT NEOPLASM OF BREAST WITH METASTASIS TO OTHER SITE, UNSPECIFIED LATERALITY: ICD-10-CM

## 2025-07-17 DIAGNOSIS — C79.81 METASTATIC MALIGNANT NEOPLASM TO BREAST: ICD-10-CM

## 2025-07-17 DIAGNOSIS — R11.0 CHEMOTHERAPY-INDUCED NAUSEA: Primary | ICD-10-CM

## 2025-07-17 DIAGNOSIS — F41.9 ANXIETY AND DEPRESSION: ICD-10-CM

## 2025-07-17 DIAGNOSIS — Z79.899 MEDICATION MANAGEMENT: ICD-10-CM

## 2025-07-17 LAB
ABSOLUTE EOSINOPHIL (OHS): 0.18 K/UL
ABSOLUTE MONOCYTE (OHS): 0.65 K/UL (ref 0.3–1)
ABSOLUTE NEUTROPHIL COUNT (OHS): 5.5 K/UL (ref 1.8–7.7)
ALBUMIN SERPL BCP-MCNC: 3.6 G/DL (ref 3.5–5.2)
ALP SERPL-CCNC: 82 UNIT/L (ref 40–150)
ALT SERPL W/O P-5'-P-CCNC: 35 UNIT/L (ref 10–44)
ANION GAP (OHS): 10 MMOL/L (ref 8–16)
AST SERPL-CCNC: 31 UNIT/L (ref 11–45)
BASOPHILS # BLD AUTO: 0.05 K/UL
BASOPHILS NFR BLD AUTO: 0.6 %
BILIRUB SERPL-MCNC: 0.4 MG/DL (ref 0.1–1)
BUN SERPL-MCNC: 10 MG/DL (ref 6–20)
CALCIUM SERPL-MCNC: 9.1 MG/DL (ref 8.7–10.5)
CHLORIDE SERPL-SCNC: 103 MMOL/L (ref 95–110)
CO2 SERPL-SCNC: 25 MMOL/L (ref 23–29)
CREAT SERPL-MCNC: 1.6 MG/DL (ref 0.5–1.4)
ERYTHROCYTE [DISTWIDTH] IN BLOOD BY AUTOMATED COUNT: 14.3 % (ref 11.5–14.5)
GFR SERPLBLD CREATININE-BSD FMLA CKD-EPI: 38 ML/MIN/1.73/M2
GLUCOSE SERPL-MCNC: 142 MG/DL (ref 70–110)
HCT VFR BLD AUTO: 41.6 % (ref 37–48.5)
HGB BLD-MCNC: 13.7 GM/DL (ref 12–16)
IMM GRANULOCYTES # BLD AUTO: 0.1 K/UL (ref 0–0.04)
IMM GRANULOCYTES NFR BLD AUTO: 1.1 % (ref 0–0.5)
LYMPHOCYTES # BLD AUTO: 2.46 K/UL (ref 1–4.8)
MAGNESIUM SERPL-MCNC: 1.9 MG/DL (ref 1.6–2.6)
MCH RBC QN AUTO: 33.1 PG (ref 27–31)
MCHC RBC AUTO-ENTMCNC: 32.9 G/DL (ref 32–36)
MCV RBC AUTO: 101 FL (ref 82–98)
NUCLEATED RBC (/100WBC) (OHS): 0 /100 WBC
PHOSPHATE SERPL-MCNC: 2.7 MG/DL (ref 2.7–4.5)
PLATELET # BLD AUTO: 207 K/UL (ref 150–450)
PMV BLD AUTO: 11.7 FL (ref 9.2–12.9)
POTASSIUM SERPL-SCNC: 4 MMOL/L (ref 3.5–5.1)
PROT SERPL-MCNC: 7.4 GM/DL (ref 6–8.4)
RBC # BLD AUTO: 4.14 M/UL (ref 4–5.4)
RELATIVE EOSINOPHIL (OHS): 2 %
RELATIVE LYMPHOCYTE (OHS): 27.5 % (ref 18–48)
RELATIVE MONOCYTE (OHS): 7.3 % (ref 4–15)
RELATIVE NEUTROPHIL (OHS): 61.5 % (ref 38–73)
SODIUM SERPL-SCNC: 138 MMOL/L (ref 136–145)
T4 FREE SERPL-MCNC: 0.59 NG/DL (ref 0.71–1.51)
TSH SERPL-ACNC: 22.83 UIU/ML (ref 0.4–4)
WBC # BLD AUTO: 8.94 K/UL (ref 3.9–12.7)

## 2025-07-17 PROCEDURE — 36415 COLL VENOUS BLD VENIPUNCTURE: CPT

## 2025-07-17 PROCEDURE — 84100 ASSAY OF PHOSPHORUS: CPT

## 2025-07-17 PROCEDURE — 85025 COMPLETE CBC W/AUTO DIFF WBC: CPT

## 2025-07-17 PROCEDURE — 99999 PR PBB SHADOW E&M-EST. PATIENT-LVL IV: CPT | Mod: PBBFAC,,, | Performed by: INTERNAL MEDICINE

## 2025-07-17 PROCEDURE — 83735 ASSAY OF MAGNESIUM: CPT

## 2025-07-17 PROCEDURE — 25000003 PHARM REV CODE 250: Performed by: INTERNAL MEDICINE

## 2025-07-17 PROCEDURE — 82040 ASSAY OF SERUM ALBUMIN: CPT

## 2025-07-17 PROCEDURE — 84439 ASSAY OF FREE THYROXINE: CPT

## 2025-07-17 PROCEDURE — 96360 HYDRATION IV INFUSION INIT: CPT

## 2025-07-17 PROCEDURE — 99214 OFFICE O/P EST MOD 30 MIN: CPT | Mod: PBBFAC,25 | Performed by: INTERNAL MEDICINE

## 2025-07-17 PROCEDURE — 63600175 PHARM REV CODE 636 W HCPCS: Performed by: INTERNAL MEDICINE

## 2025-07-17 RX ORDER — HYDROCODONE BITARTRATE AND ACETAMINOPHEN 10; 325 MG/1; MG/1
1 TABLET ORAL EVERY 8 HOURS PRN
Qty: 84 TABLET | Refills: 0 | Status: SHIPPED | OUTPATIENT
Start: 2025-07-17 | End: 2025-08-14

## 2025-07-17 RX ORDER — ALPRAZOLAM 0.5 MG/1
0.5 TABLET ORAL 2 TIMES DAILY PRN
Qty: 56 TABLET | Refills: 0 | Status: SHIPPED | OUTPATIENT
Start: 2025-07-17 | End: 2025-08-14

## 2025-07-17 RX ORDER — HEPARIN 100 UNIT/ML
500 SYRINGE INTRAVENOUS
Status: DISCONTINUED | OUTPATIENT
Start: 2025-07-17 | End: 2025-07-17 | Stop reason: HOSPADM

## 2025-07-17 RX ORDER — SODIUM CHLORIDE 0.9 % (FLUSH) 0.9 %
10 SYRINGE (ML) INJECTION
Status: DISCONTINUED | OUTPATIENT
Start: 2025-07-17 | End: 2025-07-17 | Stop reason: HOSPADM

## 2025-07-17 RX ADMIN — SODIUM CHLORIDE 1000 ML: 9 INJECTION, SOLUTION INTRAVENOUS at 02:07

## 2025-07-17 RX ADMIN — HEPARIN 500 UNITS: 100 SYRINGE at 03:07

## 2025-07-17 NOTE — PROGRESS NOTES
[facility]  [address] [facility] [address]  Phone: [phone];  Fax: [phone]    Patient ID: Josey Flores   Reason for Visit: Follow-up  MRN:  [id number]     Oncologic Diagnosis:    - Stage IV cervical squamous cell carcinoma  - Stage IV, T2 N1b M1, invasive breast carcinoma, ER/OH+, HER2-  - Papillary thyroid carcinoma status post total thyroidectomy on 08/31/2017, mpT1b N0   - Cervical HSIL MIREILLE 3 s/p LEEP, 2017  Previous Treatment:    - Palliative chemotherapy: cisplatin, paclitaxel and bevacizumab; 02/22/2021 cycle 1 day 1 -> bevacizumab maintenance after 6 cycles  - Carboplatin, paclitaxel and pembrolizumab, C1D1 1/28/22; maintenance pembrolizumab, d/c 9/2022  - Letrazole discontinued due to severe arthralgias  Current Treatment:    - Palbociclib and Arimidex  Subjective   Josey Flores is a 54 y.o. female who presents to clinic for follow up and is accompanied by her spouse.      The patient reports that she does not feel well today.  For the last couple of days she has felt unwell.  She has had some nausea and threw up randomly.  Her BP is low today for her and she feels lightheaded and dizzy.   She has been eating less the last couple of days.  She denies sick contacts.  She has not eaten anything she would not usually eat.  She has not taken any knew supplements.     Labs pending.       Review of Systems   Constitutional:  Positive for appetite change (decreased) and fatigue. Negative for activity change, chills, diaphoresis, fever and unexpected weight change.   Respiratory:  Negative for shortness of breath.    Cardiovascular:  Negative for chest pain.   Gastrointestinal:  Positive for nausea. Negative for abdominal distention, abdominal pain, anal bleeding, blood in stool, constipation, diarrhea and vomiting.   Genitourinary:  Negative for difficulty urinating and hematuria.   Musculoskeletal:  Positive for arthralgias. Negative for back pain and  myalgias.   Skin:  Negative for rash.   Neurological:  Positive for headaches. Negative for dizziness, weakness and light-headedness.   Hematological:  Does not bruise/bleed easily.   Psychiatric/Behavioral:  Positive for sleep disturbance. The patient is not nervous/anxious.      History     Oncology History   Secondary cancer of bone    Chemotherapy    Treatment Summary   Plan Name: OP GYN PACLITAXEL CISPLATIN BEVACIZUMAB Q3W  Treatment Goal: Control  Status: Inactive  Start Date: 2/22/2021  End Date: 7/7/2021  Provider: Estefani Asencio MD  Chemotherapy: CISplatin (PLATINOL) 50 mg/m2 = 108 mg in sodium chloride 0.9% 608 mL chemo infusion, 50 mg/m2 = 108 mg, Intravenous, Clinic/HOD 1 time, 6 of 6 cycles  Administration: 108 mg (2/22/2021), 108 mg (3/15/2021), 108 mg (4/6/2021), 108 mg (5/11/2021), 108 mg (6/1/2021), 108 mg (6/22/2021)    bevacizumab (AVASTIN) 15 mg/kg = 1,480 mg in sodium chloride 0.9% 100 mL chemo infusion, 15 mg/kg = 1,480 mg, Intravenous, Clinic/HOD 1 time, 6 of 6 cycles  Administration: 1,480 mg (4/6/2021), 1,480 mg (2/23/2021), 1,480 mg (3/16/2021), 1,480 mg (6/1/2021), 1,480 mg (6/22/2021), 1,480 mg (5/12/2021)    PACLitaxeL (TAXOL) 175 mg/m2 = 378 mg in sodium chloride 0.9% 563 mL chemo infusion, 175 mg/m2 = 378 mg, Intravenous, Clinic/HOD 1 time, 6 of 6 cycles  Administration: 378 mg (2/22/2021), 378 mg (3/15/2021), 378 mg (4/6/2021), 378 mg (5/11/2021), 378 mg (6/1/2021), 378 mg (6/22/2021)    Plan Name: OP BEVACIZUMAB Q3W   Treatment Goal: Maintenance  Status: Inactive  Start Date: 7/13/2021  End Date: 12/28/2021  Provider: Estefani Asencio MD  Chemotherapy: bevacizumab (AVASTIN) 1,300 mg in sodium chloride 0.9% 100 mL chemo infusion, 1,345 mg, Intravenous, [facility] 1 time, 9 of 17 cycles  Administration: 1,300 mg (7/13/2021), 1,345 mg (8/3/2021), 1,300 mg (8/24/2021), 1,300 mg (9/14/2021), 1,345 mg (10/5/2021), 1,345 mg (10/26/2021), 1,345 mg (11/16/2021), 1,300 mg (12/7/2021),  1,300 mg (12/28/2021)    Plan Name: OP CARBOPLATIN PACLITAXEL PEMBROLIZUMAB Q3W FOLLOWED BY MAINTENANCE PEMBROLIZUMAB 400 MG Q6W  Treatment Goal: Control  Status: Inactive  Start Date: 1/28/2022  End Date: 8/11/2022  Provider: Estefani Asencio MD  Chemotherapy: CARBOplatin (PARAPLATIN) 425 mg in sodium chloride 0.9% 292.5 mL chemo infusion, 425 mg, Intravenous, Clinic/HOD 1 time, 6 of 6 cycles  Dose modification:   (original dose 424 mg, Cycle 6),   (original dose 424 mg, Cycle 6)  Administration: 425 mg (1/28/2022), 495 mg (2/18/2022), 525 mg (6/2/2022), 525 mg (5/12/2022), 525 mg (3/11/2022), 495 mg (4/11/2022)    PACLitaxeL (TAXOL) 175 mg/m2 = 366 mg in sodium chloride 0.9% 561 mL chemo infusion, 175 mg/m2 = 366 mg (100 % of original dose 175 mg/m2), Intravenous, Clinic/HOD 1 time, 6 of 6 cycles  Dose modification: 175 mg/m2 (original dose 175 mg/m2, Cycle 1), 175 mg/m2 (original dose 175 mg/m2, Cycle 4)  Administration: 366 mg (1/28/2022), 366 mg (2/18/2022), 366 mg (3/11/2022), 366 mg (4/11/2022), 366 mg (5/12/2022), 366 mg (6/2/2022)       Malignant neoplasm of endocervix   9/10/2020 Imaging Significant Findings    CT A/P: Negative for ascites, omental caking, lymphadenopathy, or a gross cervical mass     12/7/2020 Biopsy    EMBX: SCC, p16+     1/12/2021 Imaging Significant Findings    MRI Pelvis w/ w/o  4.9 cm cervical mass  R parametrial invasion  R external iliac lymphadenopathy     1/27/2021 Imaging Significant Findings    PET/CT: FDG avidity in the chest     2/18/2021 Biopsy    Endoscopic biopsy of the lung: +      2/22/2021 - 6/29/2021 Chemotherapy    Cisplatin/paclitaxel/avastin x6     3/12/2021 Genetic Testing    STRATA: FRANK, PIK3c, Ep300, ERBB3, KDM6A     4/24/2021 Imaging Significant Findings    CT Pelvis w/: JUAN FRANCISCO with a normal cervix     4/26/2021 Imaging Significant Findings    PET/CT: No FDG avidity     5/18/2021 Imaging Significant Findings    CT A/P w/: JUAN FRANCISCO     6/5/2021 Imaging Significant  Findings    CT A/P w/: JUAN FRANCISCO     7/12/2021 Imaging Significant Findings    PET/CT: JUAN FRANCISCO     7/13/2021 - 12/28/2021 Maintenance Therapy    Avastin x 9     10/20/2021 Imaging Significant Findings    PET/CT: JUAN FRANCISCO     1/11/2022 Imaging Significant Findings    PET/CT: Increased FDG avidity in the cervix.  No other evidence of disease.     1/28/2022 - 6/2/2022 Chemotherapy    Carboplatin/paclitaxel/pembrolizumab x 6     3/25/2022 Imaging Significant Findings    PET/CT: Persistent FDG avidity in the cervix.  New FDG avidity in the inguinal lymph nodes (although it does not look suspicious)     4/25/2022 - 4/29/2022 Radiation Therapy    Treating physician: Dr. Vázquez (MRI guided T&O)  Total Dose: 30 Gy  Fractions: 5     6/30/2022 - 8/11/2022 Maintenance Therapy    Pembrolizumab 400 mg IV x2     8/5/2022 Imaging Significant Findings    PET/CT: JUAN FRANCISCO     10/16/2022 Imaging Significant Findings    CT A/P w/ (Women's without imaging available): JUAN FRANCISCO     1/11/2023 Imaging Significant Findings    PET/CT: JUAN FRANCISCO      Chemotherapy    Treatment Summary   Plan Name: OP GYN PACLITAXEL CISPLATIN BEVACIZUMAB Q3W  Treatment Goal: Control  Status: Inactive  Start Date: 2/22/2021  End Date: 7/7/2021  Provider: Estefani Asencio MD  Chemotherapy: CISplatin (PLATINOL) 50 mg/m2 = 108 mg in sodium chloride 0.9% 608 mL chemo infusion, 50 mg/m2 = 108 mg, Intravenous, Clinic/HOD 1 time, 6 of 6 cycles  Administration: 108 mg (2/22/2021), 108 mg (3/15/2021), 108 mg (4/6/2021), 108 mg (5/11/2021), 108 mg (6/1/2021), 108 mg (6/22/2021)    bevacizumab (AVASTIN) 15 mg/kg = 1,480 mg in sodium chloride 0.9% 100 mL chemo infusion, 15 mg/kg = 1,480 mg, Intravenous, Clinic/HOD 1 time, 6 of 6 cycles  Administration: 1,480 mg (4/6/2021), 1,480 mg (2/23/2021), 1,480 mg (3/16/2021), 1,480 mg (6/1/2021), 1,480 mg (6/22/2021), 1,480 mg (5/12/2021)    PACLitaxeL (TAXOL) 175 mg/m2 = 378 mg in sodium chloride 0.9% 563 mL chemo infusion, 175 mg/m2 = 378 mg, Intravenous,  Clinic/HOD 1 time, 6 of 6 cycles  Administration: 378 mg (2/22/2021), 378 mg (3/15/2021), 378 mg (4/6/2021), 378 mg (5/11/2021), 378 mg (6/1/2021), 378 mg (6/22/2021)    Plan Name: OP BEVACIZUMAB Q3W   Treatment Goal: Maintenance  Status: Inactive  Start Date: 7/13/2021  End Date: 12/28/2021  Provider: Estefani Asencio MD  Chemotherapy: bevacizumab (AVASTIN) 1,300 mg in sodium chloride 0.9% 100 mL chemo infusion, 1,345 mg, Intravenous, Clinic/HOD 1 time, 9 of 17 cycles  Administration: 1,300 mg (7/13/2021), 1,345 mg (8/3/2021), 1,300 mg (8/24/2021), 1,300 mg (9/14/2021), 1,345 mg (10/5/2021), 1,345 mg (10/26/2021), 1,345 mg (11/16/2021), 1,300 mg (12/7/2021), 1,300 mg (12/28/2021)    Plan Name: OP CARBOPLATIN PACLITAXEL PEMBROLIZUMAB Q3W FOLLOWED BY MAINTENANCE PEMBROLIZUMAB 400 MG Q6W  Treatment Goal: Control  Status: Inactive  Start Date: 1/28/2022  End Date: 8/11/2022  Provider: Estefani Asencio MD  Chemotherapy: CARBOplatin (PARAPLATIN) 425 mg in sodium chloride 0.9% 292.5 mL chemo infusion, 425 mg, Intravenous, Clinic/HOD 1 time, 6 of 6 cycles  Dose modification:   (original dose 424 mg, Cycle 6),   (original dose 424 mg, Cycle 6)  Administration: 425 mg (1/28/2022), 495 mg (2/18/2022), 525 mg (6/2/2022), 525 mg (5/12/2022), 525 mg (3/11/2022), 495 mg (4/11/2022)    PACLitaxeL (TAXOL) 175 mg/m2 = 366 mg in sodium chloride 0.9% 561 mL chemo infusion, 175 mg/m2 = 366 mg (100 % of original dose 175 mg/m2), Intravenous, Clinic/HOD 1 time, 6 of 6 cycles  Dose modification: 175 mg/m2 (original dose 175 mg/m2, Cycle 1), 175 mg/m2 (original dose 175 mg/m2, Cycle 4)  Administration: 366 mg (1/28/2022), 366 mg (2/18/2022), 366 mg (3/11/2022), 366 mg (4/11/2022), 366 mg (5/12/2022), 366 mg (6/2/2022)             Past Medical History:   Diagnosis Date    Anxiety     Asthma     Breast cancer 2017    Cancer     right breast, metastatic-lymph nodes, bone, and thyroid     COPD (chronic obstructive pulmonary disease)      Depression     History of psychiatric hospitalization     2000; suicidal ideation    Hx of psychiatric care     Hypothyroidism     Intractable pain 04/25/2024    Miscarriage     five miscarriages    Obesity     Psychiatric problem     Thyroid cancer 2017       Past Surgical History:   Procedure Laterality Date    ABSCESS DRAINAGE      bilateral axilla    ADENOIDECTOMY      APPENDECTOMY      BREAST BIOPSY Right     x3    CHOLECYSTECTOMY      COLONOSCOPY N/A 8/5/2024    Procedure: COLONOSCOPY cc/bt done on 7/22/24 Dr. Eckert;  Surgeon: Doreen Beckman MD;  Location: [facility] ENDO;  Service: Endoscopy;  Laterality: N/A;    ENDOBRONCHIAL ULTRASOUND Bilateral 02/18/2021    Procedure: ENDOBRONCHIAL ULTRASOUND (EBUS);  Surgeon: Jaron Ni MD;  Location: [facility] ENDO;  Service: Pulmonary;  Laterality: Bilateral;    FLUOROSCOPY N/A 01/28/2021    Procedure: Mediport placement;  Surgeon: Noah Phelan MD;  Location: [facility] CATH LAB;  Service: General;  Laterality: N/A;    MASS EXCISION      MEDIPORT INSERTION, SINGLE Right 02/2021    SKIN GRAFT  06/16/2017    THYROIDECTOMY Bilateral     TONSILLECTOMY         Family History   Problem Relation Name Age of Onset    Arthritis Mother Beatrice     Early death Mother Beatrice         64 at time of death    Heart attack Mother Beatrice     Heart disease Mother Beatrice     Heart failure Mother Beatrice     Hypertension Mother Beatrice     Coronary artery disease Father David     Hearing loss Father David     Heart disease Father David     Hyperlipidemia Father David     Hypertension Father David     Mental illness Father David     Learning disabilities Son Keyur     Cancer Maternal Aunt         Review of patient's allergies indicates:   Allergen Reactions    Gabapentin Swelling     Note: unilateral joint edema, unclear if due to gabapentin    SWELLING  Note: unilateral joint edema, unclear if due to gabapentin      Note: unilateral joint edema, unclear if due to  gabapentin  SWELLING  Note: unilateral joint edema, unclear if due to gabapentin      Note: unilateral joint edema, unclear if due to gabapentin    Nsaids (non-steroidal anti-inflammatory drug) Other (See Comments)     Instructed not to take NSAID drugs with chemo pill.    Other Reaction(s): Other (See Comments)      Instructed not to take NSAID drugs with chemo pill.  DO NOT TAKE PER MD INSTRUCTION      Instructed not to take NSAID drugs with chemo pill.    Zoledronic acid Other (See Comments) and Rash     Possible osteonecrosis jaw    Clindamycin Rash    Vancomycin Itching     Other    Vancomycin analogues Itching       Social History     Tobacco Use    Smoking status: Every Day     Current packs/day: 1.00     Average packs/day: 1 pack/day for 40.5 years (40.5 ttl pk-yrs)     Types: Cigarettes     Start date: 1/1/1985     Passive exposure: Current    Smokeless tobacco: Never    Tobacco comments:     45 pack year history   Substance Use Topics    Alcohol use: No    Drug use: No     Imaging     CT CHEST ABDOMEN PELVIS WITH IV CONTRAST (XPD) - 08/14/2024     CLINICAL INDICATION: Metastatic disease evaluation     TECHNIQUE: Axial and multiplanar 2-D reformations provided.  With IV contrast     PRIORS: April 2024     FINDINGS:  1.  Emphysema  2.  Right upper lobe pulmonary nodule (series 2/40) stable  3.  Right upper lobe calcified granuloma with right hilar, mediastinal and splenic calcifications all consistent with old granulomatous disease, stable  4.  Early coronary artery calcifications  5.  Hepatomegaly with probable fatty infiltration  6.  Cholecystectomy clips without complication  7.  Inferior left renal 18 mm hypodensity consistent with simple cyst, stable  8.  Generalized atherosclerosis without aneurysm  9.  No evidence for appendicitis  10.  Diffuse thoracolumbar spondylosis without vertebral body fractures nor focal bone abnormalities        Impression:     1.  I see no evidence to suggest metastatic  "disease in the chest, abdomen nor pelvis  2.  Emphysema is present; it is an independent risk factor for lung cancer. Recommend patient be evaluated for low dose cancer screening protocol.  3.  Stable right upper lobe pulmonary nodule as described above  4.  Coronary artery calcifications  5.  Fatty liver  6.  Left renal simple cyst (no further follow-up nor evaluation recommended for this isolated abnormality)     All CT scans at [this location] are performed using dose modulation techniques as appropriate to a performed exam including the following:  Automated exposure control; adjustment of the mA and/or kV according to patient size (this includes techniques or standardized protocols for targeted exams where dose is matched to indication / reason for exam; i.e. extremities or head); use of iterative reconstruction technique.    Physical Exam   ECOG:   ECOG SCORE    0 - Fully active-able to carry on all pre-disease performance without restriction          Vitals:  BP 98/64   Pulse 94   Temp 97.5 °F (36.4 °C) (Temporal)   Ht 5' 7" (1.702 m)   Wt 106 kg (233 lb 11 oz)   LMP 12/05/2020 Comment: no cycle x3 years  SpO2 97%   BMI 36.60 kg/m²       Physical Exam  Constitutional:       General: She is not in acute distress.     Appearance: Normal appearance.   HENT:      Head: Normocephalic and atraumatic.   Eyes:      Extraocular Movements: Extraocular movements intact.      Conjunctiva/sclera: Conjunctivae normal.   Cardiovascular:      Rate and Rhythm: Normal rate.   Pulmonary:      Effort: Pulmonary effort is normal. No respiratory distress.   Abdominal:      General: There is no distension.      Palpations: Abdomen is soft. There is no hepatomegaly or splenomegaly.      Tenderness: There is no abdominal tenderness. There is no guarding.   Skin:     Findings: No rash.   Neurological:      General: No focal deficit present.      Mental Status: She is alert.   Psychiatric:         Mood and Affect: Mood normal.   "       Behavior: Behavior normal.         Thought Content: Thought content normal.          Labs   Labs:  No visits with results within 2 Day(s) from this visit.   Latest known visit with results is:   Lab Visit on 05/19/2025   Component Date Value Ref Range Status    Creatinine, Urine 05/19/2025 374.4  20.0 - 400.0 mg/dL Final    Codeine (cutoff 40ng/mL) 05/19/2025 Not Detected   Final    INTERPRETIVE INFORMATION: Codeine, U    Positive Cutoff: 40 ng/mL  Methodology: Mass Spectrometry    Morphine (cutoff 20 ng/mL) 05/19/2025 Not Detected   Final    INTERPRETIVE INFORMATION:Morphine, U     Positive Cutoff: 20 ng/mL   Methodology: Mass Spectrometry    6-acetylmorphine (cutoff 20 ng/mL) 05/19/2025 Not Detected   Final    INTERPRETIVE INFORMATION:6-acetylmorphine, U    Positive Cutoff: 20 ng/mL   Methodology: Mass Spectrometry    Oxycodone (cutoff 40 ng/mL) 05/19/2025 Not Detected   Final    INTERPRETIVE INFORMATION:Oxycodone, U    Positive Cutoff: 40 ng/mL   Methodology: Mass Spectrometry    Noroxycodone (cutoff 100 ng/mL) 05/19/2025 Not Detected   Final    INTERPRETIVE INFORMATION:Noroxycodone, U    Positive Cutoff: 100 ng/mL   Methodology: Mass Spectrometry    Oxymorphone (cutoff 40 ng/mL) 05/19/2025 Not Detected   Final    INTERPRETIVE INFORMATION:Oxymorphone, U    Positive Cutoff: 40 ng/mL   Methodology: Mass Spectrometry    Noroxymorphone (cutoff 100 ng/mL) 05/19/2025 Not Detected   Final    INTERPRETIVE INFORMATION:Noroxymorphone, U     Positive Cutoff: 100 ng/mL   Methodology: Mass Spectrometry    Hydrocodone (cutoff 40 ng/mL) 05/19/2025 Present   Final    INTERPRETIVE INFORMATION:Hydrocodone, U    Positive Cutoff: 40 ng/mL   Methodology: Mass Spectrometry    Norhydrocodone (cutoff 100 ng/mL) 05/19/2025 Present   Final    INTERPRETIVE INFORMATION:Norhydrocodone, U    Positive Cutoff: 100 ng/mL   Methodology: Mass Spectrometry    Hydromorphone (cutoff 20 ng/mL) 05/19/2025 Present   Final    INTERPRETIVE  INFORMATION:Hydromorphone, U    Positive Cutoff: 20 ng/mL   Methodology: Mass Spectrometry    Buprenorphine (cutoff 5 ng/mL) 05/19/2025 Not Detected   Final    INTERPRETIVE INFORMATION:Buprenorphine, U    Positive Cutoff: 5 ng/mL  Methodology: Mass Spectrometry    Norbuprenorphine (cutoff 20 ng/mL) 05/19/2025 Not Detected   Final    INTERPRETIVE INFORMATION:Norbuprenorphine, U    Positive Cutoff: 20 ng/mL   Methodology: Mass Spectrometry    Fentanyl (cutoff 2 ng/mL) 05/19/2025 Not Detected   Final    INTERPRETIVE INFORMATION:Fentanyl, U    Positive Cutoff: 2 ng/mL  Methodology: Mass Spectrometry    Norfentanyl (cutoff 2 ng/mL) 05/19/2025 Not Detected   Final    INTERPRETIVE INFORMATION:Norfentanyl, U    Positive Cutoff: 2 ng/mL  Methodology: Mass Spectrometry    Meperidine metabolite (cutoff 50 n* 05/19/2025 Not Detected   Final    INTERPRETIVE INFORMATION:Meperidine metabolite, U    Positive Cutoff: 50 ng/mL  Methodology: Mass Spectrometry    Tapentadol (cutoff 100 ng/mL) 05/19/2025 Not Detected   Final    INTERPRETIVE INFORMATION:Tapentadol, U    Positive Cutoff: 100 ng/mL  Methodology: Mass Spectrometry    Tapentadol-o-Sulf (cutoff 200 ng/m* 05/19/2025 Not Detected   Final    INTERPRETIVE INFORMATION:Tapentadol-o-Sulf, U    Positive Cutoff: 200 ng/mL  Methodology: Mass Spectrometry    Methadone (cutoff 150 ng/mL) 05/19/2025 Negative   Final      Presumptive negative by immunoassay. Testing by mass spectrometry   is available on request.  INTERPRETIVE INFORMATION: Methadone Screen, U    Positive Cutoff: 150 ng/mL   Methodology: Immunoassay    Tramadol (cutoff 100 ng/mL) 05/19/2025 Negative   Final      Presumptive negative by immunoassay. Testing by mass spectrometry   is available on request.  INTERPRETIVE INFORMATION:Tramadol Screen, U    Positive Cutoff: 100 ng/mL  Methodology: Immunoassay    Amphetamine (cutoff 50 ng/mL) 05/19/2025 Not Detected   Final    INTERPRETIVE INFORMATION:Amphetamine, U    Positive  Cutoff: 50 ng/mL  Methodology: Mass Spectrometry    Methamphetamine (cutoff 200 ng/mL) 05/19/2025 Not Detected   Final    INTERPRETIVE INFORMATION:Methamphetamine, U    Positive Cutoff: 200 ng/mL  Methodology: Mass Spectrometry    MDMA- Ecstasy (cutoff 200 ng/mL) 05/19/2025 Not Detected   Final    INTERPRETIVE INFORMATION:MDMA, U    Positive Cutoff: 200 ng/mL  Methodology: Mass Spectrometry    MDA (cutoff 200 ng/mL) 05/19/2025 Not Detected   Final    INTERPRETIVE INFORMATION:MDA, U    Positive Cutoff: 200 ng/mL  Methodology: Mass Spectrometry    MDEA- Josie (cutoff 200 ng/mL) 05/19/2025 Not Detected   Final    INTERPRETIVE INFORMATION:MDEA, U    Positive Cutoff: 200 ng/mL  Methodology: Mass Spectrometry    Phentermine (cutoff 100 ng/mL) 05/19/2025 Not Detected   Final    INTERPRETIVE INFORMATION:Phentermine, U    Positive Cutoff: 100 ng/mL  Methodology: Mass Spectrometry    Benzoylecgonine (cutoff 150 ng/mL) 05/19/2025 Negative   Final      Presumptive negative by immunoassay. Testing by mass spectrometry   is available on request.  INTERPRETIVE INFORMATION:Cocaine Screen, U    Positive Cutoff: 150 ng/mL  Methodology: Immunoassay    Alprazolam (cutoff 40 ng/mL) 05/19/2025 Present   Final    INTERPRETIVE INFORMATION:Alprazolam, U    Positive Cutoff: 40 ng/mL  Methodology: Mass Spectrometry    Alpha-OH-Alprazolam (cutoff 20 ng/* 05/19/2025 Present   Final    INTERPRETIVE INFORMATION:Alpha-OH-Alprazolam, U    Positive Cutoff: 20 ng/mL  Methodology: Mass Spectrometry    Clonazepam (cutoff 20 ng/mL) 05/19/2025 Not Detected   Final    INTERPRETIVE INFORMATION:Clonazepam, U    Positive Cutoff: 20 ng/mL  Methodology: Mass Spectrometry    7-Aminoclonazepam (cutoff 40 ng/mL) 05/19/2025 Not Detected   Final    INTERPRETIVE INFORMATION:7-Aminoclonazepam, U    Positive Cutoff: 40 ng/mL  Methodology: Mass Spectrometry    Diazepam (cutoff 50 ng/mL) 05/19/2025 Not Detected   Final    INTERPRETIVE INFORMATION:Diazepam, U    Positive  Cutoff: 50 ng/mL  Methodology: Mass Spectrometry    Nordiazepam (cutoff 50 ng/mL) 05/19/2025 Not Detected   Final    INTERPRETIVE INFORMATION:Nordiazepam, U    Positive Cutoff: 50 ng/mL  Methodology: Mass Spectrometry    Oxazepam (cutoff 50 ng/mL) 05/19/2025 Not Detected   Final    INTERPRETIVE INFORMATION:Oxazepam, U    Positive Cutoff: 50 ng/mL  Methodology: Mass Spectrometry    Temazepam (cutoff 50 ng/mL) 05/19/2025 Not Detected   Final    INTERPRETIVE INFORMATION:Temazepam, U    Positive Cutoff: 50 ng/mL  Methodology: Mass Spectrometry    Lorazepam (cutoff 60 ng/mL) 05/19/2025 Not Detected   Final    INTERPRETIVE INFORMATION:Lorazepam, U    Positive Cutoff: 60 ng/mL  Methodology: Mass Spectrometry    Midazolam (cutoff 20 ng/mL) 05/19/2025 Not Detected   Final    INTERPRETIVE INFORMATION:Midazolam, U    Positive Cutoff: 20 ng/mL  Methodology: Mass Spectrometry    Zolpidem (cutoff 20 ng/mL) 05/19/2025 Not Detected   Final    INTERPRETIVE INFORMATION:Zolpidem, U    Positive Cutoff: 20 ng/mL  Methodology: Mass Spectrometry    Barbiturates (cutoff 200 ng/mL) 05/19/2025 Negative   Final      Presumptive negative by immunoassay. Testing by mass spectrometry   is available on request.  INTERPRETIVE INFORMATION:Barbiturates Screen, U    Positive Cutoff: 200 ng/mL  Methodology: Immunoassay    Ethyl Glucuronide (cutoff 500 ng/m* 05/19/2025 Negative   Final      Presumptive negative by immunoassay. Testing by mass spectrometry   is available on request.  INTERPRETIVE INFORMATION:Ethyl Glucuronide Screen, U    Positive Cutoff: 500 ng/mL  Methodology: Immunoassay    Marijuana Metabolite (cutoff 20 ng* 05/19/2025 PresumptivePOS   Final      Presumptive positive by immunoassay. Testing by mass spectrometry   is available on request.  INTERPRETIVE INFORMATION: THC (Cannabinoids) Screen, U    Positive Cutoff: 50 ng/mL  Methodology: Immunoassay    PCP (cutoff 25 ng/mL) 05/19/2025 Negative   Final      Presumptive negative by  immunoassay. Testing by mass spectrometry   is available on request.  INTERPRETIVE INFORMATION:Phencyclidine Screen, U    Positive Cutoff: 25 ng/mL  Methodology: Immunoassay    Carisoprodol (cut-off 100 ng/mL) 05/19/2025 Negative   Final      Presumptive negative by immunoassay. Testing by mass spectrometry   is available on request.  INTERPRETIVE INFORMATION: Carisoprodol Screen, U    Positive Cutoff: 100 ng/mL  Methodology: Immunoassay    The carisoprodol immunoassay has cross-reactivity to carisoprodol   and meprobamate.    Targeted drug profile panel 05/19/2025 See Below   Final    Comment: Methodology: Qualitative Enzyme Immunoassay and Qualitative Liquid   Chromatography-Tandem Mass Spectrometry, Quantitative   Spectrophotometry    The absence of expected drug(s) and/or drug metabolite(s) may   indicate non-compliance, inappropriate timing of specimen   collection relative to drug administration, poor drug absorption,   diluted/adulterated urine, or limitations of testing. The   concentration must be greater than or equal to the cutoff to be   reported as present.  If specific drug concentrations are   required, contact the laboratory within two weeks of specimen   collection to request quantification by a second analytical   technique. Interpretive questions should be directed to the   laboratory.    Results based on immunoassay detection that do not match clinical   expectations should be  interpreted with caution. Confirmatory testing by mass   spectrometry for immunoassay-based results is available, if   ordered within two weeks of specimen collection. Additional                              charges apply.    For medical purposes only; not valid for forensic use.    This test was developed and its performance characteristics   determined by [facility]. It has not been cleared or   approved by the [place] Food and Drug Administration. This test was   performed in a CLIA certified laboratory and is  intended for   clinical purposes.    EER Tgt drug prof, MS/EMIT, UR, In* 05/19/2025 See Note   Final    Authorized individuals can access the [facility] Enhanced Report  with an [facility] Connect account using the following link.     Your local lab can assist you in obtaining the patient  report if you don't have a Connect account.     [email or web address]  Performed By: [facility]  [address] [place] [id number]  : Regan Rojas MD, PhD  CLIA Number: [id number]    Targeted drug profile Interp 05/19/2025 See Note   Final    ________________________________________________________________  NO DRUGS PROVIDED  ________________________________________________________________  Please call [facility] Client Services at [phone] for   Medical Director interpretation if applicable. Alternatively,   please consider the TARGETED DRUG PROF, MASS SPEC/EMIT, UR   ([id number]) which does not require medication information or provide   compliance interpretation.  ________________________________________________________________  INTERPRETIVE INFORMATION: Targeted drug profile Interp    Interpretation depends on accuracy and completeness of patient   medication information submitted by client.    Methylphenidate (cutoff 100 ng/mL) 05/19/2025 Not Detected   Final    INTERPRETIVE INFORMATION:Methylphenidate, U    Positive Cutoff: 100 ng/mL  Methodology: Mass Spectrometry    Alpha-OH-Midazolam (cutoff 20 ng/m* 05/19/2025 Not Detected   Final    INTERPRETIVE INFORMATION:Alpha-OH-Midazolam, U    Positive Cutoff: 20 ng/mL  Methodology: Mass Spectrometry    Zolpidem Metabolite (cutoff 100 ng* 05/19/2025 Not Detected   Final    INTERPRETIVE INFORMATION:Zolpidem Metabolite, U    Positive Cutoff: 100 ng/mL  Methodology: Mass Spectrometry    Gabapentin (cutoff 100 ng/mL) 05/19/2025 Not Detected   Final    INTERPRETIVE INFORMATION:Gabapentin, U    Positive Cutoff: 3,000  ng/mL  Methodology: Mass Spectrometry    Naloxone (cutoff 100 ng/mL) 05/19/2025 Not Detected   Final    INTERPRETIVE INFORMATION:Naloxone, U    Positive Cutoff: 100 ng/mL  Methodology: Mass Spectrometry    Pregabalin (cutoff 100 ng/mL) 05/19/2025 Not Detected   Final    INTERPRETIVE INFORMATION:Pregabalin, U    Positive Cutoff: 3,000 ng/mL  Methodology: Mass Spectrometry        Imaging   CT Chest Abdomen Without Contrast (XPD)  - 03/05/2025  FINDINGS: No acute or suspicious osseous abnormality is seen.     CHEST: Normal heart size.  Mild coronary artery calcifications.  No aortic aneurysm.  Stable calcified granulomas in the right and calcified right hilar and mediastinal lymph nodes.  Stable 6 mm subpleural opacity lateral right upper lobe image 41 series 2 which has a benign linear morphology on coronal image 101.  No suspicious pulmonary nodules.  Stable emphysema.  No pleural effusion.  No pathologic adenopathy.     Abdomen/pelvis: No ascites, free air or lymphadenopathy is identified.  Cholecystectomy.  Hepatic steatosis.  The liver, bile ducts, pancreas, spleen and adrenals are otherwise unremarkable.  Stable small renal cysts.  The kidneys, ureters and bladder are otherwise unremarkable.  The small bowel, colon and appendix are unremarkable.  No evidence of bowel obstruction or acute inflammatory process.  No aortic aneurysm.     Impression:  1.  No evidence of metastatic disease in the chest, abdomen or pelvis.         CT Chest Abdomen Without Contrast (XPD)  - 01/13/2024  Impression:  Stable exam.  7 mm right nodule, unchanged.  Calcified granuloma with calcified right hilar lymph nodes.  Fatty infiltration of the liver.  Status post cholecystectomy.         CT CHEST ABDOMEN PELVIS WITHOUT CONTRAST(XPD) - 04/15/24  COMPARISON:  01/13/2024     FINDINGS:  CT of chest without contrast:     The pulmonary window images demonstrate stable appearance of a calcified granuloma located laterally in the right upper  lobe and a linear density more characteristic of localized pulmonary scarring also seen laterally in the right upper lobe (axial image 179).  Centrilobular emphysema is also visible in the upper lobes bilaterally, more severe on the right.  No pleural effusion or lung consolidation is appreciated.  No pathologic lymph node enlargement is visible in the mediastinum or axilla bilaterally.  Dense lymph node calcification associated with granulomatous disease is again noted in the mediastinum and right hilum.  Heart size is normal.  No chest wall abnormalities are appreciated except for evidence of apparent prior breast biopsy on the right.  A MediPort is also seen entering the right internal jugular vein.     CT of abdomen and pelvis without contrast:     The liver is enlarged measuring 22.7 cm in its craniocaudal dimension.  Decreased attenuation is also visible in the liver parenchyma consistent with generalized hepatic steatosis.  The spleen appears normal except for multiple incidental punctate granulomatous calcifications.  The pancreas and adrenals appear normal.  The gallbladder is surgically absent.  No parenchymal abnormality, hydronephrosis or intrarenal calculi are visible in either kidney.  No free intraperitoneal fluid or lymph node enlargement is identified.     Images obtained through the pelvis demonstrate no gross abnormality in an incompletely distended urinary bladder.  The uterus and ovaries are surgically absent.  No free pelvic fluid or abnormal soft tissue masses are identified.     Impression:  Emphysematous changes, most severe in the right upper lobe.  Stable faint noncalcified nodular focal pulmonary scarring in the right upper lobe compared to 01/13/2024.  Hepatomegaly and generalized hepatic steatosis.  Status post cholecystectomy and hysterectomy.    Assessment and Plan   Stage IV (T2 N1b M1) Breast Cancer, +/+/-  Drug Management   Restaging CT CAP 04/15/24 Stable  Continue Ibrance and  Letrozole; patient with possible severe arthralgias from letrazole so will trial Arimidex this month  Patient to have labs q4w with MD/MIGUEL visit q12w      Bony metastatic disease:    Previously on bisphosphonate complicated by osteonecrosis of the jaw following with ENT  Uncontrolled pain; semi-recently discontinued pain medication  Discussed acupuncture, but she will think about it  Added Flexeril for cramping pain  Follow up with  palliative care       Metastatic Cervical squamous cell carcinoma   History of HSIL Status post LEEP 2017.   Dec 2020 patient had vaginal bleeding and MRI pelvis showed LAD  TB discussion 01/29/21: consensus for systemic chemotherapy given advanced cervical squamous cell carcinoma with cisplatin, paclitaxel and bevacizumab with discontinuation of palbociclib but can continue aromatase inhibitor; taxane may also be active for her concomitant metastatic breast cancer.   Renetta genetic testing negative  Treated with 6C (initiated 02/21/21) of Cis/Paclitaxel/Karina with improvement of disease and started on maintenance Karina   PET-CT January 2022 notable for uptake in cervical region concerning for oligo progression/recurrence of her disease.  Previously biopsy proven metastatic cervical cancer in lungs without evidence of recurrent mass/lymphadenopathy/avidity in this region or otherwise.  Treated with chemo-immunotherapy (C1 Pembrolizumab and Carbo/Paclitaxel)  and vaginal brachy therapy(done [facility])  Restaging scans after this showed no evidence of recurrence and decision to hold therapy made; she was resumed on Ibrance for metastatic breast cancer  Continue with surveillance imaging       Chronic Medical Conditions  Hx of CVA June 2023  COPD  History of papillary thyroid carcinoma status post total thyroidectomy on 08/31/2017:   s/p total thyroidectomy on levothyroxine.   Hypothyroidism:   Previously on levothyroxine 275 mcg daily.    TFTs normalized   Increased Synthroid to 300 mcg daily and  TFTs improved.  Continue Synthroid 300 mcg daily  Continue repeat thyroid function labs for monitoring.  Refer to endocrinology   Macrocytosis:   Possibly related to thyroid disorder will continue to monitor.  She is on vitamin-B supplement.  No new anemia.  Neuropathy:     Mild, will monitor   Tobacco abuse:    Precontemplative; continued cessation encouraged; will obtain LDCT of chest for screening purposes; discussed with patient and she is in agreement    Abdominal Cramping  Drug Induced Diarrhea, Resolved  Secondary to Ibrance  Counseled to use Imodium        Med Onc Chart Routing      Follow up with physician 3 months.   Follow up with MIGUEL    Infusion scheduling note    Injection scheduling note    Labs CBC, CMP, phosphorus and magnesium   Scheduling:  Preferred lab:  Lab interval: every 4 weeks     Imaging CT chest abdomen pelvis   ASAP   Pharmacy appointment    Other referrals                The patient was seen, interviewed and examined. Pertinent lab and radiologic studies were reviewed. Pt instructed to call should they develop concerning signs/symptoms or have further questions.        Portions of the record may have been created with voice recognition software. Occasional wrong-word or sound-a-like substitutions may have occurred due to the inherent limitations of voice recognition software. Read the chart carefully and recognize, using context, where substitutions have occurred.      Donita Nuñez MD    Hematology/Oncology

## 2025-07-17 NOTE — PLAN OF CARE
Problem: Adult Inpatient Plan of Care  Goal: Plan of Care Review  Outcome: Progressing  Flowsheets (Taken 7/17/2025 1423)  Plan of Care Reviewed With: patient  Goal: Patient-Specific Goal (Individualized)  Outcome: Progressing  Flowsheets (Taken 7/17/2025 1423)  Individualized Care Needs: feet elevated in recliner for comfort measures  Anxieties, Fears or Concerns: patient reports pain  Patient/Family-Specific Goals (Include Timeframe): tolerate fluids without adverse reaction  Goal: Optimal Comfort and Wellbeing  Outcome: Progressing  Intervention: Provide Person-Centered Care  Flowsheets (Taken 7/17/2025 1423)  Trust Relationship/Rapport:   care explained   questions encouraged   choices provided   reassurance provided   emotional support provided   thoughts/feelings acknowledged   empathic listening provided   questions answered

## 2025-07-22 ENCOUNTER — DOCUMENTATION ONLY (OUTPATIENT)
Dept: HEMATOLOGY/ONCOLOGY | Facility: CLINIC | Age: 55
End: 2025-07-22
Payer: MEDICAID

## 2025-07-22 NOTE — PROGRESS NOTES
CT scan of the chest, abdomin and pelvis before and after contrast. Has been approval and vaild from 7/22/25 -9/9/2025        Auth number [id number]

## 2025-07-29 ENCOUNTER — HOSPITAL ENCOUNTER (OUTPATIENT)
Dept: RADIOLOGY | Facility: HOSPITAL | Age: 55
Discharge: HOME | End: 2025-07-29
Attending: INTERNAL MEDICINE
Payer: MEDICAID

## 2025-07-29 DIAGNOSIS — C77.3 MALIGNANT NEOPLASM METASTATIC TO LYMPH NODE OF AXILLA: ICD-10-CM

## 2025-07-29 DIAGNOSIS — C50.811 MALIGNANT NEOPLASM OF OVERLAPPING SITES OF RIGHT BREAST IN FEMALE, ESTROGEN RECEPTOR POSITIVE: ICD-10-CM

## 2025-07-29 DIAGNOSIS — Z17.0 MALIGNANT NEOPLASM OF OVERLAPPING SITES OF RIGHT BREAST IN FEMALE, ESTROGEN RECEPTOR POSITIVE: ICD-10-CM

## 2025-07-29 PROCEDURE — 25500020 PHARM REV CODE 255: Performed by: INTERNAL MEDICINE

## 2025-07-29 PROCEDURE — 74177 CT ABD & PELVIS W/CONTRAST: CPT | Mod: 26,,, | Performed by: RADIOLOGY

## 2025-07-29 PROCEDURE — 74177 CT ABD & PELVIS W/CONTRAST: CPT | Mod: TC

## 2025-07-29 PROCEDURE — 71260 CT THORAX DX C+: CPT | Mod: 26,,, | Performed by: RADIOLOGY

## 2025-07-29 RX ADMIN — IOHEXOL 75 ML: 350 INJECTION, SOLUTION INTRAVENOUS at 10:07

## 2025-08-15 DIAGNOSIS — C50.811 MALIGNANT NEOPLASM OF OVERLAPPING SITES OF RIGHT BREAST IN FEMALE, ESTROGEN RECEPTOR POSITIVE: ICD-10-CM

## 2025-08-15 DIAGNOSIS — C79.81 METASTATIC MALIGNANT NEOPLASM TO BREAST: ICD-10-CM

## 2025-08-15 DIAGNOSIS — F41.9 ANXIETY AND DEPRESSION: ICD-10-CM

## 2025-08-15 DIAGNOSIS — G89.3 NEOPLASM RELATED PAIN: ICD-10-CM

## 2025-08-15 DIAGNOSIS — F32.A ANXIETY AND DEPRESSION: ICD-10-CM

## 2025-08-15 DIAGNOSIS — Z17.0 MALIGNANT NEOPLASM OF OVERLAPPING SITES OF RIGHT BREAST IN FEMALE, ESTROGEN RECEPTOR POSITIVE: ICD-10-CM

## 2025-08-15 DIAGNOSIS — C79.51 SECONDARY CANCER OF BONE: ICD-10-CM

## 2025-08-18 RX ORDER — ALPRAZOLAM 0.5 MG/1
0.5 TABLET ORAL 2 TIMES DAILY PRN
Qty: 56 TABLET | Refills: 0 | Status: SHIPPED | OUTPATIENT
Start: 2025-08-18 | End: 2025-09-15

## 2025-08-18 RX ORDER — HYDROCODONE BITARTRATE AND ACETAMINOPHEN 10; 325 MG/1; MG/1
1 TABLET ORAL EVERY 8 HOURS PRN
Qty: 84 TABLET | Refills: 0 | Status: SHIPPED | OUTPATIENT
Start: 2025-08-18 | End: 2025-09-15

## 2025-08-28 ENCOUNTER — TELEPHONE (OUTPATIENT)
Dept: OTOLARYNGOLOGY | Facility: CLINIC | Age: 55
End: 2025-08-28
Payer: MEDICAID

## (undated) DEVICE — ELECTRODE REM PLYHSV RETURN 9

## (undated) DEVICE — DRAIN PENRS STERILE LTX 18X1/2

## (undated) DEVICE — DRAPE LARGE FOR V.A.C. SYS

## (undated) DEVICE — GLOVE SURG BIOGEL LATEX SZ 7.5

## (undated) DEVICE — SEE MEDLINE ITEM 157194

## (undated) DEVICE — SEE MEDLINE ITEM 157148

## (undated) DEVICE — HOOK STAY ELAS 5MM 8EA/PK

## (undated) DEVICE — SEE MEDLINE ITEM 152739

## (undated) DEVICE — SUT 5/0 18IN PROLENE BL MO

## (undated) DEVICE — SEE MEDLINE ITEM 157027

## (undated) DEVICE — MANIFOLD 4 PORT

## (undated) DEVICE — NDL ECLIPSE SAFETY 18GX1-1/2IN

## (undated) DEVICE — APPLICATOR CHLORAPREP ORN 26ML

## (undated) DEVICE — SPONGE LAP 18X18 PREWASHED

## (undated) DEVICE — SUT CTD VICRYL 2-0 UND BR

## (undated) DEVICE — SUT VICRYL 0 SH

## (undated) DEVICE — Device

## (undated) DEVICE — GAUZE SPONGE 4X4 12PLY

## (undated) DEVICE — KIT INTRODUCER STIFFEN MICRO

## (undated) DEVICE — SUT MONOCYRL 4-0 PS2 UND

## (undated) DEVICE — SHEET THYROID W/ISO-BAC

## (undated) DEVICE — TAPE CLOTH SOFT MEDIPORE 4IN

## (undated) DEVICE — SUT VICRYL 3-0 27 SH

## (undated) DEVICE — DRESSING N ADH OIL EMUL 3X8

## (undated) DEVICE — SOL NS 1000CC

## (undated) DEVICE — GLOVE PROTEXIS HYDROGEL SZ7.5

## (undated) DEVICE — NDL SAFETY 25G X 1.5 ECLIPSE

## (undated) DEVICE — SEE MEDLINE ITEM 152622

## (undated) DEVICE — SEE MEDLINE ITEM 157117

## (undated) DEVICE — PROBE SIMULATOR KRAFF

## (undated) DEVICE — CARRIER SKIN GRFT 1 1/2 TO 1

## (undated) DEVICE — CANISTER VACCUUM DRSNG KCI

## (undated) DEVICE — COVER OVERHEAD SURG LT BLUE

## (undated) DEVICE — DRESSING INFOVAC LARGE BLK

## (undated) DEVICE — CONNECTOR Y T.R.A.C.

## (undated) DEVICE — BLADE DERMATOME 10/BOX

## (undated) DEVICE — FOAM APP FILM BARRIER NO STING

## (undated) DEVICE — PACK CATH LAB CUSTOM BR

## (undated) DEVICE — DRESSING TRANS 2X2 TEGADERM

## (undated) DEVICE — NDL HYPO SAFETY 22 X 1 1/2

## (undated) DEVICE — SYR 10CC LUER LOCK

## (undated) DEVICE — PAD ABD 8X10 STERILE

## (undated) DEVICE — DERMATOME CARRIER

## (undated) DEVICE — DRAPE PLASTIC U 60X72

## (undated) DEVICE — HEMOSTAT SURGICEL SNOW ABSORB

## (undated) DEVICE — STAPLER SKIN PROXIMATE REG

## (undated) DEVICE — SEE MEDLINE ITEM 146345

## (undated) DEVICE — ADHESIVE MASTISOL VIAL 48/BX

## (undated) DEVICE — ELECTRODE BLADE INSULATED 1 IN

## (undated) DEVICE — APPLIER LIGACLIP SM 9.38IN

## (undated) DEVICE — SEE MEDLINE ITEM 146308

## (undated) DEVICE — SUT MCRYL PLUS 4-0 PS2 27IN

## (undated) DEVICE — SYS VAC W/PAD CONN/CLM

## (undated) DEVICE — SUT 2/0 30IN SILK BLK BRAI

## (undated) DEVICE — SOL IRR NACL .9% 3000ML

## (undated) DEVICE — SPONGE GAUZE 16PLY 4X4

## (undated) DEVICE — SYR ONLY LUER LOCK 20CC

## (undated) DEVICE — APPLIER CLIP LIAGCLIP 9.375IN

## (undated) DEVICE — KIT FIBRIN SEALANT EVICEL 5 ML

## (undated) DEVICE — SUT MONOCRYL PLUS 5-0 PS-2

## (undated) DEVICE — GAUZE SPONGE PEANUT STRL

## (undated) DEVICE — SUT 3-0 VICRYL / SH (J416)

## (undated) DEVICE — SOL 9P NACL IRR PIC IL

## (undated) DEVICE — DRESSING INFOVAC SMALL BLK

## (undated) DEVICE — CORD BIPOLAR ELECTROSURGICAL

## (undated) DEVICE — ITEM INACTIVATED - ERP

## (undated) DEVICE — SUT SILK 2-0 STRANDS 30IN

## (undated) DEVICE — RETRACTOR LONE STAR 14.1X14.1

## (undated) DEVICE — SHEARS HARMONIC CRVD 9 CM